# Patient Record
Sex: FEMALE | Race: WHITE | NOT HISPANIC OR LATINO | Employment: OTHER | URBAN - METROPOLITAN AREA
[De-identification: names, ages, dates, MRNs, and addresses within clinical notes are randomized per-mention and may not be internally consistent; named-entity substitution may affect disease eponyms.]

---

## 2018-03-23 ENCOUNTER — TRANSFERRED RECORDS (OUTPATIENT)
Dept: HEALTH INFORMATION MANAGEMENT | Facility: CLINIC | Age: 58
End: 2018-03-23

## 2018-03-24 ENCOUNTER — TRANSFERRED RECORDS (OUTPATIENT)
Dept: HEALTH INFORMATION MANAGEMENT | Facility: CLINIC | Age: 58
End: 2018-03-24

## 2018-03-27 ENCOUNTER — TRANSFERRED RECORDS (OUTPATIENT)
Dept: HEALTH INFORMATION MANAGEMENT | Facility: CLINIC | Age: 58
End: 2018-03-27

## 2018-04-20 ENCOUNTER — TRANSFERRED RECORDS (OUTPATIENT)
Dept: HEALTH INFORMATION MANAGEMENT | Facility: CLINIC | Age: 58
End: 2018-04-20

## 2018-04-21 ENCOUNTER — TRANSFERRED RECORDS (OUTPATIENT)
Dept: HEALTH INFORMATION MANAGEMENT | Facility: CLINIC | Age: 58
End: 2018-04-21

## 2018-04-24 ENCOUNTER — TRANSFERRED RECORDS (OUTPATIENT)
Dept: HEALTH INFORMATION MANAGEMENT | Facility: CLINIC | Age: 58
End: 2018-04-24

## 2018-04-25 ENCOUNTER — TRANSFERRED RECORDS (OUTPATIENT)
Dept: HEALTH INFORMATION MANAGEMENT | Facility: CLINIC | Age: 58
End: 2018-04-25

## 2018-04-26 ENCOUNTER — TRANSFERRED RECORDS (OUTPATIENT)
Dept: HEALTH INFORMATION MANAGEMENT | Facility: CLINIC | Age: 58
End: 2018-04-26

## 2018-05-02 ENCOUNTER — TRANSFERRED RECORDS (OUTPATIENT)
Dept: HEALTH INFORMATION MANAGEMENT | Facility: CLINIC | Age: 58
End: 2018-05-02

## 2018-05-03 ENCOUNTER — TRANSFERRED RECORDS (OUTPATIENT)
Dept: HEALTH INFORMATION MANAGEMENT | Facility: CLINIC | Age: 58
End: 2018-05-03

## 2018-05-16 ENCOUNTER — TRANSFERRED RECORDS (OUTPATIENT)
Dept: HEALTH INFORMATION MANAGEMENT | Facility: CLINIC | Age: 58
End: 2018-05-16

## 2018-07-02 ENCOUNTER — TRANSFERRED RECORDS (OUTPATIENT)
Dept: HEALTH INFORMATION MANAGEMENT | Facility: CLINIC | Age: 58
End: 2018-07-02

## 2018-07-03 ENCOUNTER — TRANSFERRED RECORDS (OUTPATIENT)
Dept: HEALTH INFORMATION MANAGEMENT | Facility: CLINIC | Age: 58
End: 2018-07-03

## 2018-07-07 ENCOUNTER — TRANSFERRED RECORDS (OUTPATIENT)
Dept: HEALTH INFORMATION MANAGEMENT | Facility: CLINIC | Age: 58
End: 2018-07-07

## 2018-07-09 ENCOUNTER — TRANSFERRED RECORDS (OUTPATIENT)
Dept: HEALTH INFORMATION MANAGEMENT | Facility: CLINIC | Age: 58
End: 2018-07-09

## 2018-07-13 ENCOUNTER — TRANSFERRED RECORDS (OUTPATIENT)
Dept: HEALTH INFORMATION MANAGEMENT | Facility: CLINIC | Age: 58
End: 2018-07-13

## 2018-07-15 ENCOUNTER — TRANSFERRED RECORDS (OUTPATIENT)
Dept: HEALTH INFORMATION MANAGEMENT | Facility: CLINIC | Age: 58
End: 2018-07-15

## 2018-07-16 ENCOUNTER — TRANSFERRED RECORDS (OUTPATIENT)
Dept: HEALTH INFORMATION MANAGEMENT | Facility: CLINIC | Age: 58
End: 2018-07-16

## 2018-07-21 ENCOUNTER — TRANSFERRED RECORDS (OUTPATIENT)
Dept: HEALTH INFORMATION MANAGEMENT | Facility: CLINIC | Age: 58
End: 2018-07-21

## 2018-07-22 ENCOUNTER — TRANSFERRED RECORDS (OUTPATIENT)
Dept: HEALTH INFORMATION MANAGEMENT | Facility: CLINIC | Age: 58
End: 2018-07-22

## 2018-07-26 ENCOUNTER — TRANSFERRED RECORDS (OUTPATIENT)
Dept: HEALTH INFORMATION MANAGEMENT | Facility: CLINIC | Age: 58
End: 2018-07-26

## 2018-09-01 ENCOUNTER — TRANSFERRED RECORDS (OUTPATIENT)
Dept: HEALTH INFORMATION MANAGEMENT | Facility: CLINIC | Age: 58
End: 2018-09-01

## 2019-08-20 ENCOUNTER — TRANSFERRED RECORDS (OUTPATIENT)
Dept: HEALTH INFORMATION MANAGEMENT | Facility: CLINIC | Age: 59
End: 2019-08-20

## 2020-04-29 ENCOUNTER — TRANSFERRED RECORDS (OUTPATIENT)
Dept: HEALTH INFORMATION MANAGEMENT | Facility: CLINIC | Age: 60
End: 2020-04-29

## 2020-08-17 ENCOUNTER — TELEPHONE (OUTPATIENT)
Dept: INTERNAL MEDICINE | Facility: CLINIC | Age: 60
End: 2020-08-17

## 2020-08-17 NOTE — TELEPHONE ENCOUNTER
[facility] Call Center    Phone Message    May a detailed message be left on voicemail: yes     Reason for Call: pt's daughter, Mel, called requesting a NEW pt appt for her mother. Pt just moved to [facility] from [place], [place]. After scheduling this appt, Mel started explaining more and more conditions that this pt has, Mel, just did not know how to talk about the needs her mother has, phone call took a very long time for writer to get at the conditions: pt had stroke in May 2018. Pt now has early onset vascular dementia, diabetic, hyperthyroidism, degenerative arthritis, and in the last two weeks more urinary incontinence issues. The pt moved in with her daughter, Mel, and Mel is going to become her caregiver.  Mel wanted Dr. Janine Hope OR a female Internist (attending). There is no availability with Dr. Hope until October and pt needs to be seen in the next two weeks as pt is starting to run out of medications and has no providers in the [facility]. Mel is expecially worried about lantus medication. Mel will have to have a pcp who will help get pt to the many specialists who will need to help this pt.  There may be other conditions but it took more than 30 mins to help Mel discuss the conditions listed in this msg.    Writer scheduled this pt with the earliest availability at [facility], Dr. Carmen Driscoll on 9/8/20. But writer is concerned about the complexity of this pt's care and pt is going to need an attending pcp.  Pt has a # of medications. Pt's med recs are in [facility] and [facility] in [place].  Please review this request and call pt's daughter, Mel back to let her know IF the clinic wants this pt to be seen by a different provider.   Mel's # 1 concern is not to run out of RX, lantus. Pt will be running out of that medication very soon. Mel is just beginning that learning curve of caregiving and she is not organized  yet and is unsure about what she is explaining. Please call Mel and help her. Thank you.        Action Taken: Message routed to:  [facility]    Travel Screening: Not Applicable

## 2020-08-18 NOTE — TELEPHONE ENCOUNTER
I called pt daughter Vishal and spoke with her regarding scheduling. Mother has dementia and is not able to schedule on her own. Vishal states that mother will be in [place] until September, I explained that the patient has to be in [place] to see a provider due to legal reasons.     Patient daughter insists on female IM MD doctor to treat her mother as she feels that her mother's condition is complex. Pt daughter requesting with Dr. Castaneda.     I scheduled her for video est. Care with Dr. Castaneda in September per her request. I gave her instructions on getting her medical records to our clinic to have on hand prior to her appointment.   Toshia Peñaloza, EMT at 2:56 PM on 8/18/2020.

## 2020-08-19 ENCOUNTER — DOCUMENTATION ONLY (OUTPATIENT)
Dept: CARE COORDINATION | Facility: CLINIC | Age: 60
End: 2020-08-19

## 2020-09-02 ENCOUNTER — VIRTUAL VISIT (OUTPATIENT)
Dept: FAMILY MEDICINE | Facility: CLINIC | Age: 60
End: 2020-09-02
Payer: COMMERCIAL

## 2020-09-02 DIAGNOSIS — Z86.39 HISTORY OF INSULIN DEPENDENT DIABETES MELLITUS: ICD-10-CM

## 2020-09-02 DIAGNOSIS — Z76.89 ENCOUNTER TO ESTABLISH CARE WITH NEW DOCTOR: Primary | ICD-10-CM

## 2020-09-02 DIAGNOSIS — E03.9 ACQUIRED HYPOTHYROIDISM: ICD-10-CM

## 2020-09-02 DIAGNOSIS — I10 BENIGN ESSENTIAL HYPERTENSION: ICD-10-CM

## 2020-09-02 DIAGNOSIS — R56.9 SEIZURES (H): ICD-10-CM

## 2020-09-02 DIAGNOSIS — E78.5 HYPERLIPIDEMIA LDL GOAL <100: ICD-10-CM

## 2020-09-02 DIAGNOSIS — M47.899 OTHER OSTEOARTHRITIS OF SPINE, UNSPECIFIED SPINAL REGION: ICD-10-CM

## 2020-09-02 DIAGNOSIS — E53.8 VITAMIN B12 DEFICIENCY (NON ANEMIC): ICD-10-CM

## 2020-09-02 DIAGNOSIS — E55.9 VITAMIN D DEFICIENCY: ICD-10-CM

## 2020-09-02 RX ORDER — ASPIRIN 81 MG/1
81 TABLET, CHEWABLE ORAL DAILY
Qty: 100 TABLET | Refills: 3 | Status: SHIPPED | OUTPATIENT
Start: 2020-09-02 | End: 2020-12-13 | Stop reason: DRUGHIGH

## 2020-09-02 RX ORDER — INSULIN LISPRO 100 [IU]/ML
INJECTION, SOLUTION INTRAVENOUS; SUBCUTANEOUS
Qty: 45 ML | Refills: 11 | Status: ON HOLD | OUTPATIENT
Start: 2020-09-02 | End: 2020-10-01

## 2020-09-02 RX ORDER — PREGABALIN 100 MG/1
100 CAPSULE ORAL 2 TIMES DAILY
Qty: 60 CAPSULE | Refills: 0 | Status: SHIPPED | OUTPATIENT
Start: 2020-09-02 | End: 2020-10-21

## 2020-09-02 RX ORDER — LEVOTHYROXINE SODIUM 88 UG/1
88 TABLET ORAL DAILY
COMMUNITY
End: 2020-09-02

## 2020-09-02 RX ORDER — LEVETIRACETAM 500 MG/1
500 TABLET ORAL 2 TIMES DAILY
COMMUNITY
End: 2020-09-02

## 2020-09-02 RX ORDER — CARVEDILOL 3.12 MG/1
3.12 TABLET ORAL 2 TIMES DAILY WITH MEALS
Qty: 180 TABLET | Refills: 3 | Status: ON HOLD | OUTPATIENT
Start: 2020-09-02 | End: 2021-06-27

## 2020-09-02 RX ORDER — ASPIRIN 81 MG/1
81 TABLET, CHEWABLE ORAL DAILY
COMMUNITY
End: 2020-09-02

## 2020-09-02 RX ORDER — HYDROCHLOROTHIAZIDE 25 MG/1
25 TABLET ORAL DAILY
Qty: 90 TABLET | Refills: 3 | Status: SHIPPED | OUTPATIENT
Start: 2020-09-02 | End: 2020-12-16

## 2020-09-02 RX ORDER — LANOLIN ALCOHOL/MO/W.PET/CERES
CREAM (GRAM) TOPICAL DAILY
COMMUNITY
End: 2020-09-02

## 2020-09-02 RX ORDER — ATORVASTATIN CALCIUM 40 MG/1
40 TABLET, FILM COATED ORAL DAILY
COMMUNITY
End: 2020-09-02

## 2020-09-02 RX ORDER — HYDROCHLOROTHIAZIDE 25 MG/1
25 TABLET ORAL DAILY
COMMUNITY
End: 2020-09-02

## 2020-09-02 RX ORDER — PREGABALIN 100 MG/1
100 CAPSULE ORAL
COMMUNITY
End: 2020-09-02

## 2020-09-02 RX ORDER — LEVOTHYROXINE SODIUM 88 MCG
88 TABLET ORAL DAILY
Qty: 90 TABLET | Refills: 3 | Status: SHIPPED | OUTPATIENT
Start: 2020-09-02 | End: 2021-07-26

## 2020-09-02 RX ORDER — HYDROCODONE BITARTRATE AND ACETAMINOPHEN 10; 325 MG/1; MG/1
1 TABLET ORAL
COMMUNITY
End: 2020-09-02

## 2020-09-02 RX ORDER — CARVEDILOL 12.5 MG/1
12.5 TABLET ORAL 2 TIMES DAILY WITH MEALS
COMMUNITY
End: 2020-09-02

## 2020-09-02 RX ORDER — VITAMIN B COMPLEX
1 TABLET ORAL DAILY
Qty: 100 TABLET | Refills: 3 | Status: SHIPPED | OUTPATIENT
Start: 2020-09-02 | End: 2023-03-15

## 2020-09-02 RX ORDER — LANOLIN ALCOHOL/MO/W.PET/CERES
1000 CREAM (GRAM) TOPICAL DAILY
Qty: 100 TABLET | Refills: 3 | Status: SHIPPED | OUTPATIENT
Start: 2020-09-02 | End: 2023-03-15

## 2020-09-02 RX ORDER — INSULIN GLARGINE 100 [IU]/ML
18 INJECTION, SOLUTION SUBCUTANEOUS AT BEDTIME
COMMUNITY
End: 2020-09-02

## 2020-09-02 RX ORDER — LEVETIRACETAM 500 MG/1
500 TABLET ORAL 2 TIMES DAILY
Qty: 180 TABLET | Refills: 3 | Status: SHIPPED | OUTPATIENT
Start: 2020-09-02 | End: 2021-08-30

## 2020-09-02 RX ORDER — ATORVASTATIN CALCIUM 40 MG/1
40 TABLET, FILM COATED ORAL DAILY
Qty: 90 TABLET | Refills: 3 | Status: SHIPPED | OUTPATIENT
Start: 2020-09-02 | End: 2021-07-27

## 2020-09-02 NOTE — PROGRESS NOTES
"Isabell Matthews is a 60 year old female who is being evaluated via a billable video visit.      The patient has been notified of following:     \"This video visit will be conducted via a call between you and your physician/provider. We have found that certain health care needs can be provided without the need for an in-person physical exam.  This service lets us provide the care you need with a video conversation.  If a prescription is necessary we can send it directly to your pharmacy.  If lab work is needed we can place an order for that and you can then stop by our lab to have the test done at a later time.    Video visits are billed at different rates depending on your insurance coverage.  Please reach out to your insurance provider with any questions.    If during the course of the call the physician/provider feels a video visit is not appropriate, you will not be charged for this service.\"    Patient has given verbal consent for Video visit? Yes  How would you like to obtain your AVS? MyChart  If you are dropped from the video visit, the video invite should be resent to: Send to e-mail at: [email or web address]  Will anyone else be joining your video visit? No     Amwell not working switched to Doximetry      Subjective     Isabell Matthews is a 60 year old female who presents today via video visit for the following health issues:    HPI  Lisa Matthews is a 60-year-old female who presents via video visit today with her daughter Charli and to establish primary care and refill medications.  She has a complicated past medical history which includes diabetes insulin requiring with history of ketoacidosis, renal impairment, hypothyroidism, coronary artery disease with MI, hyperlipidemia, hypertension, seizure disorder, vascular dementia degernative arthritis and renal impairment.  She recently moved to the [facility] now living in [place] and needs to establish primary care.  She has been out of some of her medications and " needs refills.  Her daughter indicates she had a very extensive cardiac history with a prolonged hospitalization also with stroke requiring rehabilitation.  Since her stroke she has had some cognitive impairment and her daughter Kadeem helps with her personal cares and manages her medication.  She has been off of carvedilol previous dose was 12.5 mg twice daily but her daughter was giving it to her PRN.  I reviewed that most often carvedilol is a scheduled medication and is important for cardiac health.  As she has been off of the medication we will start a lower dose and titrate up.  She indicates she also has significant arthritis of her back and bilateral hips with previous hip replacement.  She has significant pain.  In the past she was on oxycodone 10/325 however as she has not been able to get this through her previous providers her daughter has been cutting the pill in half and now she is only been getting at most a half a pill as needed.  She also has been on Lyrica 100 mg taking twice daily.  Need to establish primary care.    Needs refills on medication has health insurance through [employer]. Lives in [facility].  I reviewed her medications in detail with her daughter.  At this time she is on aspirin 81 mg previously was on Brilinta however her providers indicated due to cost they could switch to aspirin.  Her records are not available for me to review at this time therefore we will try to get record release.    Review of Systems   No current viral illness. Glucose have been 100-300s No current chest pain      Patient Active Problem List   Diagnosis     Benign essential hypertension     Acquired hypothyroidism     Seizures (H)     Hyperlipidemia LDL goal <100     History of insulin dependent diabetes mellitus     Vitamin D deficiency     Vitamin B12 deficiency (non anemic)     Other osteoarthritis of spine, unspecified spinal region     Past Medical History:   Diagnosis Date     Chronic pain       Essential hypertension      Ex-cigarette smoker     quit 7/2018 over 35 years     Hyperlipidemia      Hypothyroid      Insulin-requiring or dependent type II diabetes mellitus (H)      Seizures (H)      Stroke (H)      Vascular dementia (H)      Past Surgical History:   Procedure Laterality Date     athroplasty hip Bilateral     2003, 2006   Current non smoker   SH Lives with her daughter Charli who provides cares.  Objective           Vitals:  No vitals were obtained today due to virtual visit.    Physical Exam   Lisa was present for the visit and was interactive sitting with her daughter.  She appears to have slow cognition but was interactive and answered appropriately with speech coherent.  She appeared in no acute distress and was appropriately groomed.  Teeth appear to be in poor health but difficult to evaluate from video visit.  Pulmonary no cough no use of accessory muscles no signs of distress.  Psychiatric mood appeared to be quiet but interactive.  Neurological patient did not communicate her medications and let her daughter explain her health history.  I was not able to ascertain if there were any significant weakness daughter indicated most of the stroke impact was related to cognition.  Remainder physical exam unable to perform due to video visit.        Assessment & Plan     Lisa Caceres is a 60-year-old female with complicated PMH who presents today via video visit with her daughter to establish primary care and for renewal of her medications.  I discussed the need for an in person visit to review in more detail her health history and also requested release of medical records.  I was able to send her medications to her local pharmacy.  At this time she is off of Percocet therefore I opted to not fill as I discussed that this is a very risky medication due to sedation and potential for overdose.  We will continue with Lyrica 100 mg twice daily and I discussed the possibility of a medication such  as Cymbalta to assist with her pain in the future.  Baseline labs will need to be ordered could be same day as her follow-up.    Isabell was seen today for establish care.    Diagnoses and all orders for this visit:    Encounter to establish care with new doctor    Benign essential hypertension  -     NIFEdipine ER (ADALAT CC) 60 MG 24 hr tablet; Take 1 tablet (60 mg) by mouth daily  -     hydrochlorothiazide (HYDRODIURIL) 25 MG tablet; Take 1 tablet (25 mg) by mouth daily  -     carvedilol (COREG) 3.125 MG tablet; Take 1 tablet (3.125 mg) by mouth 2 times daily (with meals)  -     Vitamin D3 (CHOLECALCIFEROL) 25 mcg (1000 units) tablet; Take 1 tablet (25 mcg) by mouth daily  -     Comprehensive metabolic panel; Future    Acquired hypothyroidism  -     SYNTHROID 88 MCG tablet; Take 1 tablet (88 mcg) by mouth daily  -     TSH with free T4 reflex; Future    Seizures (H)  -     levETIRAcetam (KEPPRA) 500 MG tablet; Take 1 tablet (500 mg) by mouth 2 times daily  -     CBC with platelets differential; Future    Hyperlipidemia LDL goal <100  -     atorvastatin (LIPITOR) 40 MG tablet; Take 1 tablet (40 mg) by mouth daily  -     Lipid panel reflex to direct LDL Fasting; Future    History of insulin dependent diabetes mellitus  -     insulin glargine (LANTUS PEN) 100 UNIT/ML pen; Inject 18 Units Subcutaneous At Bedtime  -     insulin lispro (HUMALOG KWIKPEN) 100 UNIT/ML (1 unit dial) KWIKPEN; 4 units with food plus sliding scale see below usual dose 24 to 48 daily  4 [id number] to 200 5 units 201 to 250 6 units 251 to 300 7 units 301 to 400 8 units 401 to 450 9 units 451 to 500 High 14 units   -     aspirin (ASA) 81 MG chewable tablet; Take 1 tablet (81 mg) by mouth daily  -     insulin pen needle (31G X 8 MM) 31G X 8 MM miscellaneous; Use 4 pen needles daily or as directed.  -     Hemoglobin A1c; Future  -     Albumin Random Urine Quantitative with Creat Ratio; Future    Vitamin D deficiency  -     Vitamin D Deficiency;  Future    Vitamin B12 deficiency (non anemic)  -     cyanocobalamin (VITAMIN B-12) 1000 MCG tablet; Take 1 tablet (1,000 mcg) by mouth daily  -     Vitamin B12; Future    Other osteoarthritis of spine, unspecified spinal region  -     pregabalin (LYRICA) 100 MG capsule; Take 1 capsule (100 mg) by mouth 2 times daily           1 month in person      Bony Castaneda MD  [facility]      Video-Visit Details    Type of service:  Video Visit    Video  Time:11:03-11:50    Originating Location (pt. Location): Home    Distant Location (provider location):  [facility]     [facility] for [facility] Visit[facility]

## 2020-09-02 NOTE — Clinical Note
Please follow-up with patient and daughter Charli to determine  if they  were able to access mychart, record release and any other follow-up needed for her transition to [place].  Best wishes,  Bony Castaneda MD

## 2020-09-27 ENCOUNTER — APPOINTMENT (OUTPATIENT)
Dept: CT IMAGING | Facility: CLINIC | Age: 60
DRG: 637 | End: 2020-09-27
Attending: EMERGENCY MEDICINE
Payer: MEDICARE

## 2020-09-27 ENCOUNTER — HOSPITAL ENCOUNTER (INPATIENT)
Facility: CLINIC | Age: 60
LOS: 4 days | Discharge: HOME | DRG: 637 | End: 2020-10-01
Attending: EMERGENCY MEDICINE | Admitting: ORTHOPAEDIC SURGERY
Payer: MEDICARE

## 2020-09-27 ENCOUNTER — APPOINTMENT (OUTPATIENT)
Dept: GENERAL RADIOLOGY | Facility: CLINIC | Age: 60
DRG: 637 | End: 2020-09-27
Attending: EMERGENCY MEDICINE
Payer: MEDICARE

## 2020-09-27 DIAGNOSIS — I10 BENIGN ESSENTIAL HYPERTENSION: ICD-10-CM

## 2020-09-27 DIAGNOSIS — E11.10 DKA (DIABETIC KETOACIDOSES): ICD-10-CM

## 2020-09-27 DIAGNOSIS — Z86.39 HISTORY OF INSULIN DEPENDENT DIABETES MELLITUS: ICD-10-CM

## 2020-09-27 DIAGNOSIS — R56.9 SEIZURES (H): ICD-10-CM

## 2020-09-27 DIAGNOSIS — Z79.4 ENCOUNTER FOR LONG-TERM (CURRENT) USE OF INSULIN (H): ICD-10-CM

## 2020-09-27 DIAGNOSIS — E10.10 DIABETIC KETOACIDOSIS WITHOUT COMA ASSOCIATED WITH TYPE 1 DIABETES MELLITUS (H): ICD-10-CM

## 2020-09-27 DIAGNOSIS — E11.10 DIABETIC KETOACIDOSIS WITHOUT COMA ASSOCIATED WITH TYPE 2 DIABETES MELLITUS (H): Primary | ICD-10-CM

## 2020-09-27 LAB
ALBUMIN SERPL-MCNC: 3.6 G/DL (ref 3.4–5)
ALBUMIN UR-MCNC: NEGATIVE MG/DL
ALP SERPL-CCNC: 221 U/L (ref 40–150)
ALT SERPL W P-5'-P-CCNC: 24 U/L (ref 0–50)
ANION GAP SERPL CALCULATED.3IONS-SCNC: 18 MMOL/L (ref 3–14)
ANISOCYTOSIS BLD QL SMEAR: SLIGHT
APPEARANCE UR: ABNORMAL
APTT PPP: 28 SEC (ref 22–37)
APTT PPP: NORMAL SEC (ref 22–37)
AST SERPL W P-5'-P-CCNC: 50 U/L (ref 0–45)
BACTERIA #/AREA URNS HPF: ABNORMAL /HPF
BASOPHILS # BLD AUTO: 0.1 10E9/L (ref 0–0.2)
BASOPHILS NFR BLD AUTO: 0.8 %
BILIRUB SERPL-MCNC: 0.4 MG/DL (ref 0.2–1.3)
BILIRUB UR QL STRIP: NEGATIVE
BUN SERPL-MCNC: 68 MG/DL (ref 7–30)
CA-I BLD-SCNC: 5 MG/DL (ref 4.4–5.2)
CALCIUM SERPL-MCNC: 9.6 MG/DL (ref 8.5–10.1)
CHLORIDE SERPL-SCNC: 98 MMOL/L (ref 94–109)
CO2 BLDCOV-SCNC: 18 MMOL/L (ref 21–28)
CO2 SERPL-SCNC: 16 MMOL/L (ref 20–32)
COLOR UR AUTO: ABNORMAL
CREAT SERPL-MCNC: 2.55 MG/DL (ref 0.52–1.04)
DIFFERENTIAL METHOD BLD: ABNORMAL
EOSINOPHIL # BLD AUTO: 0 10E9/L (ref 0–0.7)
EOSINOPHIL NFR BLD AUTO: 0 %
ERYTHROCYTE [DISTWIDTH] IN BLOOD BY AUTOMATED COUNT: 15.1 % (ref 10–15)
GFR SERPL CREATININE-BSD FRML MDRD: 19 ML/MIN/{1.73_M2}
GLUCOSE BLD-MCNC: 663 MG/DL (ref 70–99)
GLUCOSE BLDC GLUCOMTR-MCNC: 220 MG/DL (ref 70–99)
GLUCOSE BLDC GLUCOMTR-MCNC: 237 MG/DL (ref 70–99)
GLUCOSE BLDC GLUCOMTR-MCNC: 246 MG/DL (ref 70–99)
GLUCOSE BLDC GLUCOMTR-MCNC: 314 MG/DL (ref 70–99)
GLUCOSE BLDC GLUCOMTR-MCNC: 398 MG/DL (ref 70–99)
GLUCOSE BLDC GLUCOMTR-MCNC: 441 MG/DL (ref 70–99)
GLUCOSE BLDC GLUCOMTR-MCNC: >600 MG/DL (ref 70–99)
GLUCOSE SERPL-MCNC: 672 MG/DL (ref 70–99)
GLUCOSE UR STRIP-MCNC: >1000 MG/DL
HBA1C MFR BLD: 11.7 % (ref 0–5.6)
HCT VFR BLD AUTO: 35.2 % (ref 35–47)
HCT VFR BLD CALC: 37 %PCV (ref 35–47)
HGB BLD CALC-MCNC: 12.6 G/DL (ref 11.7–15.7)
HGB BLD-MCNC: 11 G/DL (ref 11.7–15.7)
HGB UR QL STRIP: NEGATIVE
INR PPP: 0.99 (ref 0.86–1.14)
KETONES BLD-SCNC: 3.7 MMOL/L (ref 0–0.6)
KETONES BLD-SCNC: 4.9 MMOL/L (ref 0–0.6)
KETONES UR STRIP-MCNC: 40 MG/DL
LACTATE BLD-SCNC: 1.4 MMOL/L (ref 0.7–2)
LACTATE BLD-SCNC: 2.2 MMOL/L (ref 0.7–2)
LEUKOCYTE ESTERASE UR QL STRIP: NEGATIVE
LIPASE SERPL-CCNC: 40 U/L (ref 73–393)
LYMPHOCYTES # BLD AUTO: 0.5 10E9/L (ref 0.8–5.3)
LYMPHOCYTES NFR BLD AUTO: 4.2 %
MAGNESIUM SERPL-MCNC: 2.1 MG/DL (ref 1.6–2.3)
MCH RBC QN AUTO: 27 PG (ref 26.5–33)
MCHC RBC AUTO-ENTMCNC: 31.3 G/DL (ref 31.5–36.5)
MCV RBC AUTO: 87 FL (ref 78–100)
MONOCYTES # BLD AUTO: 0.3 10E9/L (ref 0–1.3)
MONOCYTES NFR BLD AUTO: 2.5 %
MUCOUS THREADS #/AREA URNS LPF: PRESENT /LPF
NEUTROPHILS # BLD AUTO: 11.9 10E9/L (ref 1.6–8.3)
NEUTROPHILS NFR BLD AUTO: 92.5 %
NITRATE UR QL: NEGATIVE
OSMOLALITY SERPL: 357 MMOL/KG (ref 280–301)
PCO2 BLDV: 38 MM HG (ref 40–50)
PH BLDV: 7.28 PH (ref 7.32–7.43)
PH UR STRIP: 5 PH (ref 5–7)
PHOSPHATE SERPL-MCNC: 3 MG/DL (ref 2.5–4.5)
PLATELET # BLD AUTO: 235 10E9/L (ref 150–450)
PLATELET # BLD EST: ABNORMAL 10*3/UL
PO2 BLDV: 26 MM HG (ref 25–47)
POTASSIUM BLD-SCNC: 4.4 MMOL/L (ref 3.4–5.3)
POTASSIUM SERPL-SCNC: 4.4 MMOL/L (ref 3.4–5.3)
PROCALCITONIN SERPL-MCNC: 5.54 NG/ML
PROT SERPL-MCNC: 8.1 G/DL (ref 6.8–8.8)
RBC # BLD AUTO: 4.07 10E12/L (ref 3.8–5.2)
RBC #/AREA URNS AUTO: <1 /HPF (ref 0–2)
SAO2 % BLDV FROM PO2: 42 %
SODIUM BLD-SCNC: 132 MMOL/L (ref 133–144)
SODIUM SERPL-SCNC: 132 MMOL/L (ref 133–144)
SOURCE: ABNORMAL
SP GR UR STRIP: 1.02 (ref 1–1.03)
TSH SERPL DL<=0.005 MIU/L-ACNC: 0.65 MU/L (ref 0.4–4)
UROBILINOGEN UR STRIP-MCNC: NORMAL MG/DL (ref 0–2)
WBC # BLD AUTO: 12.9 10E9/L (ref 4–11)
WBC #/AREA URNS AUTO: 1 /HPF (ref 0–5)

## 2020-09-27 PROCEDURE — 85730 THROMBOPLASTIN TIME PARTIAL: CPT | Performed by: EMERGENCY MEDICINE

## 2020-09-27 PROCEDURE — 87040 BLOOD CULTURE FOR BACTERIA: CPT | Performed by: PHYSICIAN ASSISTANT

## 2020-09-27 PROCEDURE — 93005 ELECTROCARDIOGRAM TRACING: CPT | Mod: 76 | Performed by: EMERGENCY MEDICINE

## 2020-09-27 PROCEDURE — 96368 THER/DIAG CONCURRENT INF: CPT | Performed by: EMERGENCY MEDICINE

## 2020-09-27 PROCEDURE — 99223 1ST HOSP IP/OBS HIGH 75: CPT | Mod: AI | Performed by: ORTHOPAEDIC SURGERY

## 2020-09-27 PROCEDURE — U0003 INFECTIOUS AGENT DETECTION BY NUCLEIC ACID (DNA OR RNA); SEVERE ACUTE RESPIRATORY SYNDROME CORONAVIRUS 2 (SARS-COV-2) (CORONAVIRUS DISEASE [COVID-19]), AMPLIFIED PROBE TECHNIQUE, MAKING USE OF HIGH THROUGHPUT TECHNOLOGIES AS DESCRIBED BY CMS-2020-01-R: HCPCS | Performed by: EMERGENCY MEDICINE

## 2020-09-27 PROCEDURE — 99285 EMERGENCY DEPT VISIT HI MDM: CPT | Performed by: EMERGENCY MEDICINE

## 2020-09-27 PROCEDURE — 93005 ELECTROCARDIOGRAM TRACING: CPT | Performed by: EMERGENCY MEDICINE

## 2020-09-27 PROCEDURE — 93010 ELECTROCARDIOGRAM REPORT: CPT | Mod: Z6 | Performed by: EMERGENCY MEDICINE

## 2020-09-27 PROCEDURE — 40000556 ZZH STATISTIC PERIPHERAL IV START W US GUIDANCE

## 2020-09-27 PROCEDURE — 74176 CT ABD & PELVIS W/O CONTRAST: CPT

## 2020-09-27 PROCEDURE — 36415 COLL VENOUS BLD VENIPUNCTURE: CPT | Performed by: EMERGENCY MEDICINE

## 2020-09-27 PROCEDURE — 83690 ASSAY OF LIPASE: CPT | Performed by: EMERGENCY MEDICINE

## 2020-09-27 PROCEDURE — 12000004 ZZH R&B IMCU UMMC

## 2020-09-27 PROCEDURE — 84443 ASSAY THYROID STIM HORMONE: CPT | Performed by: EMERGENCY MEDICINE

## 2020-09-27 PROCEDURE — 71045 X-RAY EXAM CHEST 1 VIEW: CPT

## 2020-09-27 PROCEDURE — 85610 PROTHROMBIN TIME: CPT | Performed by: EMERGENCY MEDICINE

## 2020-09-27 PROCEDURE — C9803 HOPD COVID-19 SPEC COLLECT: HCPCS

## 2020-09-27 PROCEDURE — 81001 URINALYSIS AUTO W/SCOPE: CPT | Performed by: EMERGENCY MEDICINE

## 2020-09-27 PROCEDURE — 96366 THER/PROPH/DIAG IV INF ADDON: CPT | Performed by: EMERGENCY MEDICINE

## 2020-09-27 PROCEDURE — 25000125 ZZHC RX 250: Performed by: EMERGENCY MEDICINE

## 2020-09-27 PROCEDURE — 96361 HYDRATE IV INFUSION ADD-ON: CPT | Performed by: EMERGENCY MEDICINE

## 2020-09-27 PROCEDURE — 82010 KETONE BODYS QUAN: CPT | Performed by: EMERGENCY MEDICINE

## 2020-09-27 PROCEDURE — 25800030 ZZH RX IP 258 OP 636: Performed by: EMERGENCY MEDICINE

## 2020-09-27 PROCEDURE — 85025 COMPLETE CBC W/AUTO DIFF WBC: CPT | Performed by: EMERGENCY MEDICINE

## 2020-09-27 PROCEDURE — 99207 ZZC APP CREDIT; MD BILLING SHARED VISIT: CPT | Performed by: PHYSICIAN ASSISTANT

## 2020-09-27 PROCEDURE — 80053 COMPREHEN METABOLIC PANEL: CPT | Performed by: EMERGENCY MEDICINE

## 2020-09-27 PROCEDURE — 96365 THER/PROPH/DIAG IV INF INIT: CPT | Performed by: EMERGENCY MEDICINE

## 2020-09-27 PROCEDURE — 83605 ASSAY OF LACTIC ACID: CPT | Performed by: EMERGENCY MEDICINE

## 2020-09-27 PROCEDURE — 82330 ASSAY OF CALCIUM: CPT

## 2020-09-27 PROCEDURE — 83930 ASSAY OF BLOOD OSMOLALITY: CPT | Performed by: EMERGENCY MEDICINE

## 2020-09-27 PROCEDURE — 40000498 ZZHCL STATISTIC POTASSIUM ED POCT

## 2020-09-27 PROCEDURE — 82803 BLOOD GASES ANY COMBINATION: CPT

## 2020-09-27 PROCEDURE — 84145 PROCALCITONIN (PCT): CPT | Performed by: EMERGENCY MEDICINE

## 2020-09-27 PROCEDURE — 83036 HEMOGLOBIN GLYCOSYLATED A1C: CPT | Performed by: EMERGENCY MEDICINE

## 2020-09-27 PROCEDURE — 25000125 ZZHC RX 250: Performed by: PHYSICIAN ASSISTANT

## 2020-09-27 PROCEDURE — 40000501 ZZHCL STATISTIC HEMATOCRIT ED POCT

## 2020-09-27 PROCEDURE — 00000146 ZZHCL STATISTIC GLUCOSE BY METER IP

## 2020-09-27 PROCEDURE — 40000497 ZZHCL STATISTIC SODIUM ED POCT

## 2020-09-27 PROCEDURE — 40000502 ZZHCL STATISTIC GLUCOSE ED POCT

## 2020-09-27 PROCEDURE — 25000128 H RX IP 250 OP 636: Performed by: EMERGENCY MEDICINE

## 2020-09-27 PROCEDURE — 70450 CT HEAD/BRAIN W/O DYE: CPT

## 2020-09-27 PROCEDURE — 80320 DRUG SCREEN QUANTALCOHOLS: CPT | Performed by: PHYSICIAN ASSISTANT

## 2020-09-27 PROCEDURE — 84100 ASSAY OF PHOSPHORUS: CPT | Performed by: PHYSICIAN ASSISTANT

## 2020-09-27 PROCEDURE — 36415 COLL VENOUS BLD VENIPUNCTURE: CPT | Performed by: PHYSICIAN ASSISTANT

## 2020-09-27 PROCEDURE — 80307 DRUG TEST PRSMV CHEM ANLYZR: CPT | Performed by: PHYSICIAN ASSISTANT

## 2020-09-27 PROCEDURE — 87040 BLOOD CULTURE FOR BACTERIA: CPT | Performed by: EMERGENCY MEDICINE

## 2020-09-27 PROCEDURE — 87086 URINE CULTURE/COLONY COUNT: CPT | Performed by: EMERGENCY MEDICINE

## 2020-09-27 PROCEDURE — 99291 CRITICAL CARE FIRST HOUR: CPT | Mod: 25 | Performed by: EMERGENCY MEDICINE

## 2020-09-27 PROCEDURE — 25800030 ZZH RX IP 258 OP 636: Performed by: PHYSICIAN ASSISTANT

## 2020-09-27 PROCEDURE — 83735 ASSAY OF MAGNESIUM: CPT | Performed by: PHYSICIAN ASSISTANT

## 2020-09-27 PROCEDURE — 82010 KETONE BODYS QUAN: CPT | Performed by: PHYSICIAN ASSISTANT

## 2020-09-27 RX ORDER — ASPIRIN 81 MG/1
81 TABLET, CHEWABLE ORAL DAILY
Status: DISCONTINUED | OUTPATIENT
Start: 2020-09-28 | End: 2020-10-01 | Stop reason: HOSPADM

## 2020-09-27 RX ORDER — NIFEDIPINE 30 MG/1
60 TABLET, EXTENDED RELEASE ORAL DAILY
Status: DISCONTINUED | OUTPATIENT
Start: 2020-09-28 | End: 2020-10-01 | Stop reason: HOSPADM

## 2020-09-27 RX ORDER — LEVOTHYROXINE SODIUM 88 UG/1
88 TABLET ORAL DAILY
Status: DISCONTINUED | OUTPATIENT
Start: 2020-09-28 | End: 2020-10-01 | Stop reason: HOSPADM

## 2020-09-27 RX ORDER — ONDANSETRON 2 MG/ML
4 INJECTION INTRAMUSCULAR; INTRAVENOUS ONCE
Status: DISCONTINUED | OUTPATIENT
Start: 2020-09-27 | End: 2020-09-29

## 2020-09-27 RX ORDER — HEPARIN SODIUM 5000 [USP'U]/.5ML
5000 INJECTION, SOLUTION INTRAVENOUS; SUBCUTANEOUS EVERY 12 HOURS
Status: DISCONTINUED | OUTPATIENT
Start: 2020-09-27 | End: 2020-10-01 | Stop reason: HOSPADM

## 2020-09-27 RX ORDER — PHENOL 1.4 %
10 AEROSOL, SPRAY (ML) MUCOUS MEMBRANE AT BEDTIME
COMMUNITY
End: 2021-07-18

## 2020-09-27 RX ORDER — CARVEDILOL 3.12 MG/1
3.12 TABLET ORAL 2 TIMES DAILY WITH MEALS
Status: DISCONTINUED | OUTPATIENT
Start: 2020-09-27 | End: 2020-10-01 | Stop reason: HOSPADM

## 2020-09-27 RX ORDER — NICOTINE POLACRILEX 4 MG
15-30 LOZENGE BUCCAL
Status: DISCONTINUED | OUTPATIENT
Start: 2020-09-27 | End: 2020-09-29

## 2020-09-27 RX ORDER — DEXTROSE MONOHYDRATE 25 G/50ML
25-50 INJECTION, SOLUTION INTRAVENOUS
Status: DISCONTINUED | OUTPATIENT
Start: 2020-09-27 | End: 2020-09-29

## 2020-09-27 RX ORDER — LEVETIRACETAM 500 MG/1
500 TABLET ORAL 2 TIMES DAILY
Status: DISCONTINUED | OUTPATIENT
Start: 2020-09-28 | End: 2020-10-01 | Stop reason: HOSPADM

## 2020-09-27 RX ORDER — PIPERACILLIN SODIUM, TAZOBACTAM SODIUM 3; .375 G/15ML; G/15ML
3.38 INJECTION, POWDER, LYOPHILIZED, FOR SOLUTION INTRAVENOUS EVERY 6 HOURS
Status: DISCONTINUED | OUTPATIENT
Start: 2020-09-28 | End: 2020-09-28

## 2020-09-27 RX ORDER — VITAMIN B COMPLEX
25 TABLET ORAL DAILY
Status: DISCONTINUED | OUTPATIENT
Start: 2020-09-28 | End: 2020-10-01 | Stop reason: HOSPADM

## 2020-09-27 RX ORDER — PREGABALIN 100 MG/1
100 CAPSULE ORAL 2 TIMES DAILY
Status: DISCONTINUED | OUTPATIENT
Start: 2020-09-27 | End: 2020-10-01 | Stop reason: HOSPADM

## 2020-09-27 RX ORDER — HYDROCHLOROTHIAZIDE 25 MG/1
25 TABLET ORAL DAILY
Status: DISCONTINUED | OUTPATIENT
Start: 2020-09-28 | End: 2020-10-01 | Stop reason: HOSPADM

## 2020-09-27 RX ORDER — SODIUM CHLORIDE 9 MG/ML
INJECTION, SOLUTION INTRAVENOUS CONTINUOUS
Status: DISCONTINUED | OUTPATIENT
Start: 2020-09-27 | End: 2020-09-28

## 2020-09-27 RX ORDER — LEVETIRACETAM 10 MG/ML
1000 INJECTION INTRAVASCULAR ONCE
Status: COMPLETED | OUTPATIENT
Start: 2020-09-27 | End: 2020-09-27

## 2020-09-27 RX ORDER — LANOLIN ALCOHOL/MO/W.PET/CERES
1000 CREAM (GRAM) TOPICAL DAILY
Status: DISCONTINUED | OUTPATIENT
Start: 2020-09-28 | End: 2020-10-01 | Stop reason: HOSPADM

## 2020-09-27 RX ORDER — PIPERACILLIN SODIUM, TAZOBACTAM SODIUM 3; .375 G/15ML; G/15ML
3.38 INJECTION, POWDER, LYOPHILIZED, FOR SOLUTION INTRAVENOUS ONCE
Status: COMPLETED | OUTPATIENT
Start: 2020-09-27 | End: 2020-09-27

## 2020-09-27 RX ADMIN — PIPERACILLIN SODIUM AND TAZOBACTAM SODIUM 3.38 G: 3; .375 INJECTION, POWDER, LYOPHILIZED, FOR SOLUTION INTRAVENOUS at 18:22

## 2020-09-27 RX ADMIN — SODIUM CHLORIDE 1000 ML: 9 INJECTION, SOLUTION INTRAVENOUS at 16:34

## 2020-09-27 RX ADMIN — LEVETIRACETAM 1000 MG: 10 INJECTION INTRAVENOUS at 20:38

## 2020-09-27 RX ADMIN — SODIUM CHLORIDE: 9 INJECTION, SOLUTION INTRAVENOUS at 17:46

## 2020-09-27 RX ADMIN — HUMAN INSULIN 5.5 UNITS/HR: 100 INJECTION, SOLUTION SUBCUTANEOUS at 16:34

## 2020-09-27 RX ADMIN — SODIUM CHLORIDE 1000 ML: 9 INJECTION, SOLUTION INTRAVENOUS at 17:46

## 2020-09-27 RX ADMIN — VANCOMYCIN HYDROCHLORIDE 1750 MG: 10 INJECTION, POWDER, LYOPHILIZED, FOR SOLUTION INTRAVENOUS at 19:03

## 2020-09-27 NOTE — ED PROVIDER NOTES
"ED Provider Note  [facility]      History     Chief Complaint   Patient presents with     Hyperglycemia     Nausea & Vomiting     The history is provided by the patient and medical records.     Isabell Matthews is a 60 year old female with a past medical history significant for insulin dependent diabetes with history of DKA, dementia, kidney disease, stroke, HTN, hyperlipidemia who presents here to the Emergency Department due to hyperglycemia, nausea, and vomiting.  Patient is accompanied by her daughter who is providing most of patient's history due to condition of patient and her dementia history.  Daughter reports glucometer showed error yesterday, and \"high\" readings today.  She states patient's blood glucose is normally around 250-300.  Patient has been getting her insulin, and got it last night.  Daughter states after \"high\" reading today, she smelt the sweet ketone smell on the patient's breath and was concerned about DKA.  She states patient was barely responsive when trying to eat bread and an orange, and had an episode of emesis shortly after.  Daughter gave patient 14 units of Humalog 3 hours ago. Daughter states patient has had increased episodes of incontinence due to her dementia getting worse.  She notes cloudy urine.  Daughter reports that patient had a fall 2 weeks ago.  States patient has been experience generalized weakness but no focal weakness.  Patient takes 81 mg of aspirin.  Patient denies nausea currently.  Patient has not had any fevers, cough, shortness of breath, or COVID exposures.  Patient does not have a history of abdominal surgery.  Patient has not had any seizures recently per daughter and has been taking all of her medications. Further history and review of systems is limited by patient's AMS.     Past Medical History  Past Medical History:   Diagnosis Date     Chronic pain      Essential hypertension      Ex-cigarette smoker     quit 7/2018 over 35 years     " Hyperlipidemia      Hypothyroid      Insulin-requiring or dependent type II diabetes mellitus (H)      Seizures (H)      Stroke (H)      Vascular dementia (H)      Past Surgical History:   Procedure Laterality Date     athroplasty hip Bilateral     ,           aspirin (ASA) 81 MG chewable tablet       atorvastatin (LIPITOR) 40 MG tablet       carvedilol (COREG) 3.125 MG tablet       cyanocobalamin (VITAMIN B-12) 1000 MCG tablet       hydrochlorothiazide (HYDRODIURIL) 25 MG tablet       insulin aspart (NOVOLOG PEN) 100 UNIT/ML pen       levETIRAcetam (KEPPRA) 500 MG tablet       Melatonin 10 MG TABS tablet       NIFEdipine ER (ADALAT CC) 60 MG 24 hr tablet       SYNTHROID 88 MCG tablet       Vitamin D3 (CHOLECALCIFEROL) 25 mcg (1000 units) tablet       insulin glargine (LANTUS PEN) 100 UNIT/ML pen       insulin pen needle (31G X 8 MM) 31G X 8 MM miscellaneous       loratadine (CLEAR-ATADINE) 10 MG tablet       pregabalin (LYRICA) 100 MG capsule      Allergies   Allergen Reactions     Pollen Extract Headache     Family History  No family history on file.  Social History   Social History     Tobacco Use     Smoking status: Former Smoker     Packs/day: 0.50     Years: 35.00     Pack years: 17.50     Types: Cigarettes     Quit date: 2018     Years since quittin.3     Smokeless tobacco: Never Used   Substance Use Topics     Alcohol use: Not Currently     Drug use: Not on file      Past medical history, past surgical history, medications, allergies, family history, and social history were reviewed with the patient. No additional pertinent items.       Review of Systems  A complete review of systems was attempted but limited due to altered mental status.    Physical Exam   BP: 121/62  Pulse: 83  Temp: 97.8  F (36.6  C)  Resp: 14  SpO2: 99 %  Physical Exam  Vitals signs reviewed.   Constitutional:       Appearance: She is well-developed. She is ill-appearing.      Comments: Eyes closed at rest but is  arousable to voice. Appears lethargic. Can answer directed questions with yes or no.    HENT:      Head: Normocephalic and atraumatic.      Mouth/Throat:      Mouth: Mucous membranes are dry.      Pharynx: No oropharyngeal exudate or posterior oropharyngeal erythema.   Eyes:      Extraocular Movements: Extraocular movements intact.      Conjunctiva/sclera: Conjunctivae normal.      Pupils: Pupils are equal, round, and reactive to light.   Neck:      Musculoskeletal: Normal range of motion and neck supple. No neck rigidity.   Cardiovascular:      Rate and Rhythm: Normal rate and regular rhythm.      Pulses: Normal pulses.      Heart sounds: Normal heart sounds. No murmur.   Pulmonary:      Effort: Pulmonary effort is normal. No respiratory distress.      Breath sounds: Normal breath sounds. No wheezing or rales.   Abdominal:      General: Bowel sounds are normal. There is no distension.      Palpations: Abdomen is soft. There is no mass.      Tenderness: There is abdominal tenderness. There is no guarding or rebound.      Comments: Generalized abdominal tenderness.   Musculoskeletal: Normal range of motion.         General: No swelling or tenderness.   Skin:     General: Skin is warm and dry.      Capillary Refill: Capillary refill takes less than 2 seconds.      Findings: No rash.   Neurological:      Mental Status: She is lethargic.      Cranial Nerves: Cranial nerves are intact. No cranial nerve deficit, dysarthria or facial asymmetry.      Sensory: Sensation is intact. No sensory deficit.      Motor: No pronator drift.      Comments: Moving all 4 extremities bilaterally spontaneously and able to roll over independently in bed. Sensation intact to all 4 extremities.    Psychiatric:         Mood and Affect: Mood normal.         ED Course     3:06 PM  The patient was seen and examined by Kyra Maciel MD in Room ED10.    Procedures             EKG Interpretation:      Interpreted by Kyra Maciel,  MD  Time reviewed: 4:30 pm  Symptoms at time of EKG: AMS   Rhythm: normal sinus   Rate: normal  Axis: normal  Ectopy: none  Conduction: normal  ST Segments/ T Waves: No ST-T wave changes  Q Waves: none  Comparison to prior: Unchanged    Clinical Impression: no evidence of acute ischemia              Critical Care Addendum    My initial assessment, based on my review of prehospital provider report, review of nursing observations, review of vital signs, focused history and physical exam, established that Isabell Matthews has suspicion for sepsis and need for evaluation and early goal-directed therapy and and DKA, which requires immediate intervention, and therefore she is critically ill.     After the initial assessment, the care team initiated multiple lab tests, initiated IV fluid administration and initiated medication therapy with IV antibiotics and IV insulin gtt to provide stabilization care. Due to the critical nature of this patient, I reassessed nursing observations, vital signs, physical exam, review of cardiac rhythm monitor and mental status multiple times prior to her disposition.     Time also spent performing documentation, discussion with family to obtain medical information for decision making, reviewing test results, discussion with consultants and coordination of care.     Critical care time (excluding teaching time and procedures): 45 minutes.   The patient has signs of Severe Sepsis        If one the following conditions is present, a 30 mL/kg bolus is recommended as part of the 6 hour bundle (IBW can be used for BMI >30, or document refusal/contraindication):      1.   Initial hypotension  defined as 2 bps < 90 or map < 65 in the 6hrs before or 6hrs after time zero.     2.  Lactate >4.     The patient has signs of Severe Sepsis as evidenced by:    1. 2 SIRS criteria, AND  2. Suspected infection, AND   3. Organ dysfunction: Lactic Acidosis with value >2.0    Time severe sepsis diagnosis confirmed: 1601   09/27/20 as this was the time when Lactate resulted, and the level was > 2.0    3 Hour Severe Sepsis Bundle Completion:    1. Initial Lactic Acid Result:   Recent Labs   Lab Test 09/27/20  1929 09/27/20  1601   LACT 1.4 2.2*     2. Blood Cultures before Antibiotics: Yes  3. Broad Spectrum Antibiotics Administered:  yes       Anti-infectives (From admission through now)    Start     Dose/Rate Route Frequency Ordered Stop    09/27/20 1750  vancomycin (VANCOCIN) 1,750 mg in sodium chloride 0.9 % 500 mL intermittent infusion      1,750 mg  over 2 Hours Intravenous ONCE 09/27/20 1746 09/27/20 2121    09/27/20 1650  piperacillin-tazobactam (ZOSYN) 3.375 g vial to attach to  mL bag      3.375 g  over 30 Minutes Intravenous ONCE 09/27/20 1646 09/27/20 1912          4. Fluid volume administered in ED:  Full 30 mL/kg bolus given (see amount below).    BMI Readings from Last 1 Encounters:   09/30/20 32.72 kg/m      30 mL/kg fluids based on weight: 2,360 mL  30 mL/kg fluids based on IBW (must be >= 60 inches tall): 1,430 mL                Severe Sepsis reassessment:  1. Repeat Lactic Acid Level: 1.4  2. MAP>65 after initial IVF bolus, will continue to monitor fluid status and vital signs          Labs Ordered and Resulted from Time of ED Arrival Up to the Time of Departure from the ED   CBC WITH PLATELETS DIFFERENTIAL - Abnormal; Notable for the following components:       Result Value    WBC 12.9 (*)     Hemoglobin 11.0 (*)     MCHC 31.3 (*)     RDW 15.1 (*)     Absolute Neutrophil 11.9 (*)     Absolute Lymphocytes 0.5 (*)     All other components within normal limits   COMPREHENSIVE METABOLIC PANEL - Abnormal; Notable for the following components:    Sodium 132 (*)     Carbon Dioxide 16 (*)     Anion Gap 18 (*)     Glucose 672 (*)     Urea Nitrogen 68 (*)     Creatinine 2.55 (*)     GFR Estimate 19 (*)     GFR Estimate If Black 23 (*)     Alkaline Phosphatase 221 (*)     AST 50 (*)     All other components within normal  limits   LIPASE - Abnormal; Notable for the following components:    Lipase 40 (*)     All other components within normal limits   LACTIC ACID WHOLE BLOOD - Abnormal; Notable for the following components:    Lactic Acid 2.2 (*)     All other components within normal limits   ROUTINE UA WITH MICROSCOPIC - Abnormal; Notable for the following components:    Glucose Urine >1000 (*)     Ketones Urine 40 (*)     Bacteria Urine Few (*)     Mucous Urine Present (*)     All other components within normal limits   KETONE BETA-HYDROXYBUTYRATE QUANTITATIVE - Abnormal; Notable for the following components:    Ketone Quantitative 4.9 (*)     All other components within normal limits   GLUCOSE BY METER - Abnormal; Notable for the following components:    Glucose >600 (*)     All other components within normal limits   HEMOGLOBIN A1C - Abnormal; Notable for the following components:    Hemoglobin A1C 11.7 (*)     All other components within normal limits   GLUCOSE BY METER - Abnormal; Notable for the following components:    Glucose 441 (*)     All other components within normal limits   GLUCOSE BY METER - Abnormal; Notable for the following components:    Glucose 398 (*)     All other components within normal limits   OSMOLALITY - Abnormal; Notable for the following components:    Osmolality 357 (*)     All other components within normal limits   GLUCOSE BY METER - Abnormal; Notable for the following components:    Glucose 314 (*)     All other components within normal limits   PROCALCITONIN - Abnormal; Notable for the following components:    Procalcitonin 5.54 (*)     All other components within normal limits   GLUCOSE BY METER - Abnormal; Notable for the following components:    Glucose 246 (*)     All other components within normal limits   GLUCOSE BY METER - Abnormal; Notable for the following components:    Glucose 220 (*)     All other components within normal limits   ISTAT GASES ELEC ICA GLUC ALEE POCT - Abnormal; Notable  for the following components:    Ph Venous 7.28 (*)     PCO2 Venous 38 (*)     Bicarbonate Venous 18 (*)     Sodium 132 (*)     Glucose 663 (*)     All other components within normal limits   INR   PARTIAL THROMBOPLASTIN TIME   TSH WITH FREE T4 REFLEX   GLUCOSE MONITOR NURSING POCT   GLUCOSE MONITOR NURSING POCT   PARTIAL THROMBOPLASTIN TIME   LACTIC ACID WHOLE BLOOD   ISTAT CG8 GAS ELEC ICA GLUC ALEE NURSE POCT   CARDIAC CONTINUOUS MONITORING   NOTIFY PHYSICIAN   PERIPHERAL IV CATHETER   IV ACCESS   IP ASSIGN PROVIDER TEAM TO TREATMENT TEAM   NOTIFY PHYSICIAN       Medications   0.9% sodium chloride BOLUS (0 mLs Intravenous Stopped 9/27/20 1744)   piperacillin-tazobactam (ZOSYN) 3.375 g vial to attach to  mL bag (0 g Intravenous Stopped 9/27/20 1912)   0.9% sodium chloride BOLUS (0 mLs Intravenous Stopped 9/27/20 2120)   vancomycin (VANCOCIN) 1,750 mg in sodium chloride 0.9 % 500 mL intermittent infusion (0 mg Intravenous Stopped 9/27/20 2121)   levETIRAcetam (KEPPRA) intermittent infusion 1,000 mg (1,000 mg Intravenous Given 9/27/20 2038)   potassium chloride (KLOR-CON) Packet 40 mEq (40 mEq Oral Given 9/28/20 0852)   potassium chloride (KLOR-CON) Packet 20 mEq (20 mEq Oral Given 9/29/20 1042)   Med Instruction - Transition from IV Insulin Infusion to Sub-Q Insulin ( Does not apply Given 9/29/20 2147)   insulin glargine (LANTUS PEN) injection 3 Units (3 Units Subcutaneous Given 9/30/20 0922)        Assessments & Plan (with Medical Decision Making)   Patient presents with altered mental status and hyperglycemia.  Here she is arousable to voice and does answer directed questions with yes or no.  She appears to be moving all 4 extremities spontaneously and has no focal deficits.  She does appear lethargic however.  Blood sugar is 663 with a pH of 7.28 and a PCO2 of 38 with a bicarb of 18.  I do have concern for DKA.  We were concerned about a possible infectious etiology as per her daughter she has been  receiving her insulin at home.  White blood cell count was elevated at 12.9 and lactic acid is 2.2 and thus I do have concern for possible sepsis.  Urinalysis is ordered.  Procalcitonin also came back elevated at 5.54.  She was given 30 mL/kg bolus of normal saline and started on empiric antibiotics with IV Zosyn and IV vancomycin.  She has missed her Keppra dose with her history of seizures and thus we discussed with pharmacy giving her an IV 1 g bolus here which they agreed with.  She does not have any signs of seizure activity and daughter states that typically this would be visible and she has not had any seizures at home.  She last had her seizure medication yesterday.  Her vital signs are within normal limits and she is afebrile.  With the concern of DKA we did start IV insulin drip as well.  We will be closely checking glucose every 1 hour.  With the IV insulin this was improving. after IV fluids lactate also improved to 1.4.  Creatinine is 2.55.  Ketones are elevated at 4.9 urinalysis is pending.  She has some generalized abdominal tenderness on exam without signs of peritonitis.  Will obtain CT of the abdomen pelvis without contrast for further evaluation.  We also obtain a chest x-ray and COVID-19 testing.  CT of the head was also obtained.  EKG was obtained which did not reveal any evidence of acute ischemia.    CT of the head was unremarkable for any acute pathology.  Chest x-ray was also unremarkable.  CT the abdomen pelvis revealed stones in the kidney but no ureteral stone or sign of obstruction.  There is also a likely cyst that could be further evaluated on repeat evaluation.  I did discuss with the patient's daughter.  Plan at this time is to admit the patient to [facility] bed for further treatment of DKA and possible sepsis.  Patient is becoming more alert with the insulin drip.  Patient and her daughter were in agreement with this plan.  She was transferred to the medicine service in stable  condition.    I have reviewed the nursing notes. I have reviewed the findings, diagnosis, plan and need for follow up with the patient.    New Prescriptions    No medications on file       Final diagnoses:   Diabetic ketoacidosis without coma associated with type 1 diabetes mellitus (H)       --  Kyra Maciel MD  [facility], [place], EMERGENCY DEPARTMENT  9/27/2020     Kyra Maciel MD  10/27/20 1225

## 2020-09-27 NOTE — PHARMACY-ADMISSION MEDICATION HISTORY
Admission Medication History Completed by Pharmacy    See [facility] Admission Navigator for allergy information, preferred outpatient pharmacy, prior to admission medications and immunization status.     Medication History Sources:     Patient's daughter (Charli), 9/2 primary care note    Changes made to PTA medication list (reason):    Added: Melatonin    Deleted: vitamin B6 (pt does not use), vit D 5000 unit (pt uses 1000 unit tablet)    Changed: None    Additional Information:    Patient's daughter was a good historian and was able to report medication names, doses, frequencies, and last doses. Patient usually gets her medications at 10am and 10pm.    Prior to Admission medications    Medication Sig Last Dose Taking? Auth Provider   aspirin (ASA) 81 MG chewable tablet Take 1 tablet (81 mg) by mouth daily 9/26/2020 at Unknown time Yes Bony Castaneda MD   atorvastatin (LIPITOR) 40 MG tablet Take 1 tablet (40 mg) by mouth daily 9/26/2020 at Unknown time Yes Bony Castaneda MD   carvedilol (COREG) 3.125 MG tablet Take 1 tablet (3.125 mg) by mouth 2 times daily (with meals) 9/26/2020 at Unknown time Yes Bony Castaneda MD   cyanocobalamin (VITAMIN B-12) 1000 MCG tablet Take 1 tablet (1,000 mcg) by mouth daily 9/26/2020 at Unknown time Yes Bony Castaneda MD   hydrochlorothiazide (HYDRODIURIL) 25 MG tablet Take 1 tablet (25 mg) by mouth daily 9/26/2020 at Unknown time Yes Bony Castaneda MD   insulin glargine (LANTUS PEN) 100 UNIT/ML pen Inject 18 Units Subcutaneous At Bedtime 9/26/2020 at Unknown time Yes Bony Castaneda MD   insulin lispro (HUMALOG KWIKPEN) 100 UNIT/ML (1 unit dial) KWIKPEN 4 units with food plus sliding scale see below usual dose 24 to 48 daily 9/27/2020 at Unknown time Yes Bony Castaneda MD   levETIRAcetam (KEPPRA) 500 MG tablet Take 1 tablet (500 mg) by mouth 2 times daily 9/26/2020 at Unknown time Yes Bony Castaneda MD   Melatonin 10 MG TABS tablet Take 10  mg by mouth At Bedtime 9/26/2020 at Unknown time Yes Unknown, Entered By History   NIFEdipine ER (ADALAT CC) 60 MG 24 hr tablet Take 1 tablet (60 mg) by mouth daily 9/26/2020 at Unknown time Yes Bony Castaneda MD   pregabalin (LYRICA) 100 MG capsule Take 1 capsule (100 mg) by mouth 2 times daily 9/26/2020 at Unknown time Yes Bony Castaneda MD   SYNTHROID 88 MCG tablet Take 1 tablet (88 mcg) by mouth daily 9/26/2020 at Unknown time Yes Bony Castaneda MD   Vitamin D3 (CHOLECALCIFEROL) 25 mcg (1000 units) tablet Take 1 tablet (25 mcg) by mouth daily 9/26/2020 at Unknown time Yes Bony Castaneda MD   insulin pen needle (31G X 8 MM) 31G X 8 MM miscellaneous Use 4 pen needles daily or as directed.   Bony Castaneda MD       Date completed: 09/27/20    Medication history completed by: Catherine Werner, Pharmacy Resident

## 2020-09-27 NOTE — ED NOTES
[facility], [place]   ED Nurse to Floor Handoff     Isabell Matthews is a 60 year old female who speaks English and lives with family members,  in a home  They arrived in the ED by car from home    ED Chief Complaint: Hyperglycemia and Nausea & Vomiting    ED Dx;   Final diagnoses:   None         Needed?: No    Allergies:   Allergies   Allergen Reactions     Pollen Extract Headache   .  Past Medical Hx:   Past Medical History:   Diagnosis Date     Chronic pain      Essential hypertension      Ex-cigarette smoker     quit 7/2018 over 35 years     Hyperlipidemia      Hypothyroid      Insulin-requiring or dependent type II diabetes mellitus (H)      Seizures (H)      Stroke (H)      Vascular dementia (H)       Baseline Mental status: mild dementia  Current Mental Status changes: other altered mental status    Infection present or suspected this encounter: yes other unknown @ this time  Sepsis suspected: Yes  Isolation type: No active isolations     Activity level - Baseline/Home:  Stand with Assist  Activity Level - Current:   Stand with Assist    Bariatric equipment needed?: No    In the ED these meds were given:   Medications   0.9% sodium chloride BOLUS (0 mLs Intravenous Stopped 9/27/20 1744)     Followed by   sodium chloride 0.9% infusion ( Intravenous New Bag 9/27/20 1746)   dextrose 5% and 0.45% NaCl infusion (has no administration in time range)   dextrose 50 % injection 25-50 mL (has no administration in time range)   insulin 1 unit/1 mL in NS (NovoLIN, HumuLIN Regular) drip -ED DKA algorithm (5.5 Units/kg/hr (Dosing Weight) Intravenous Rate/Dose Verify 9/27/20 1729)   piperacillin-tazobactam (ZOSYN) 3.375 g vial to attach to  mL bag (has no administration in time range)   0.9% sodium chloride BOLUS (1,000 mLs Intravenous New Bag 9/27/20 1746)   vancomycin (VANCOCIN) 1,750 mg in sodium chloride 0.9 % 500 mL intermittent infusion (has no administration in time range)    vancomycin place james - receiving intermittent dosing (has no administration in time range)       Drips running?  Yes    Home pump  No    Current LDAs  Peripheral IV 09/27/20 Left;Anterior;Lateral Lower forearm (Active)   Number of days: 0       Urethral Catheter Temperature probe (Active)   Catheter Care Done;Catheter wipes;Soap and water/perineal cleanser 09/27/20 1813   Collection Container Standard 09/27/20 1813   Securement Method Securing device (Describe) 09/27/20 1813   Rationale for Continued Use Strict 1-2 Hour I&O 09/27/20 1813   Number of days: 0       Labs results:   Labs Ordered and Resulted from Time of ED Arrival Up to the Time of Departure from the ED   CBC WITH PLATELETS DIFFERENTIAL - Abnormal; Notable for the following components:       Result Value    WBC 12.9 (*)     Hemoglobin 11.0 (*)     MCHC 31.3 (*)     RDW 15.1 (*)     Absolute Neutrophil 11.9 (*)     Absolute Lymphocytes 0.5 (*)     All other components within normal limits   COMPREHENSIVE METABOLIC PANEL - Abnormal; Notable for the following components:    Sodium 132 (*)     Carbon Dioxide 16 (*)     Anion Gap 18 (*)     Glucose 672 (*)     Urea Nitrogen 68 (*)     Creatinine 2.55 (*)     GFR Estimate 19 (*)     GFR Estimate If Black 23 (*)     Alkaline Phosphatase 221 (*)     AST 50 (*)     All other components within normal limits   LIPASE - Abnormal; Notable for the following components:    Lipase 40 (*)     All other components within normal limits   LACTIC ACID WHOLE BLOOD - Abnormal; Notable for the following components:    Lactic Acid 2.2 (*)     All other components within normal limits   KETONE BETA-HYDROXYBUTYRATE QUANTITATIVE - Abnormal; Notable for the following components:    Ketone Quantitative 4.9 (*)     All other components within normal limits   GLUCOSE BY METER - Abnormal; Notable for the following components:    Glucose >600 (*)     All other components within normal limits   HEMOGLOBIN A1C - Abnormal; Notable  for the following components:    Hemoglobin A1C 11.7 (*)     All other components within normal limits   GLUCOSE BY METER - Abnormal; Notable for the following components:    Glucose 441 (*)     All other components within normal limits   ISTAT GASES ELEC ICA GLUC ALEE POCT - Abnormal; Notable for the following components:    Ph Venous 7.28 (*)     PCO2 Venous 38 (*)     Bicarbonate Venous 18 (*)     Sodium 132 (*)     Glucose 663 (*)     All other components within normal limits   INR   PARTIAL THROMBOPLASTIN TIME   TSH WITH FREE T4 REFLEX   GLUCOSE MONITOR NURSING POCT   COVID-19 VIRUS (CORONAVIRUS) BY PCR   GLUCOSE MONITOR NURSING POCT   PARTIAL THROMBOPLASTIN TIME   ROUTINE UA WITH MICROSCOPIC   OSMOLALITY   ISTAT CG8 GAS ELEC ICA GLUC ALEE NURSE POCT   CARDIAC CONTINUOUS MONITORING   NOTIFY PHYSICIAN   PERIPHERAL IV CATHETER   IV ACCESS   GLUCOSE BY METER POCT   GLUCOSE BY METER POCT   GLUCOSE BY METER POCT   BLOOD CULTURE   BLOOD CULTURE   URINE CULTURE AEROBIC BACTERIAL       Imaging Studies:   Recent Results (from the past 24 hour(s))   XR Chest Port 1 View    Narrative    Portable chest    INDICATION: AMS, concern for infection, evaluate for pathology    COMPARISON: None    FINDINGS: Heart size and shape appear normal. There is aortic arch  atherosclerosis. Lungs and pulmonary vascularity appear normal. Bony  structures appear grossly intact.      Impression    IMPRESSION: Aortic atherosclerosis.    ASIF LUNA MD       Recent vital signs:   BP (!) 142/66   Pulse 82   Temp 97.8  F (36.6  C) (Oral)   Resp 13   Wt 69.8 kg (153 lb 12.8 oz)   SpO2 97%     [place] Coma Scale Score: 13 (09/27/20 1558)       Cardiac Rhythm: Normal Sinus  Pt needs tele? Yes  Skin/wound Issues: None    Code Status: Full Code    Pain control: pt had none    Nausea control: pt had none    Abnormal labs/tests/findings requiring intervention: See labs    Family present during ED course? Yes   Family Comments/Social Situation  comments: Pt lives with daughter who is also her caregiver. Supportive daughter @ bedside.    Tasks needing completion: Attempting to establish second PIV for abx    Amber Patricia    [phone] [facility] ED

## 2020-09-27 NOTE — ED TRIAGE NOTES
"Pt c/o hyperglycemia    Daughter states glucometer showed error yesterday, and \"high\" readings today. 14 units humalog 3 hrs ago    Has noticed increased Incontinence w/ cloudy urine, nausea/vomiting, increased confusion/lethargy    Glucose reads \"high\" in triage, VSS  Hx stroke and seizures, vascular dementia  "

## 2020-09-28 LAB
AMPHETAMINES UR QL SCN: NEGATIVE
ANION GAP SERPL CALCULATED.3IONS-SCNC: 8 MMOL/L (ref 3–14)
ANION GAP SERPL CALCULATED.3IONS-SCNC: 8 MMOL/L (ref 3–14)
ANION GAP SERPL CALCULATED.3IONS-SCNC: 9 MMOL/L (ref 3–14)
BACTERIA SPEC CULT: NO GROWTH
BARBITURATES UR QL: NEGATIVE
BASE DEFICIT BLDV-SCNC: 4.1 MMOL/L
BENZODIAZ UR QL: NEGATIVE
BUN SERPL-MCNC: 29 MG/DL (ref 7–30)
BUN SERPL-MCNC: 31 MG/DL (ref 7–30)
BUN SERPL-MCNC: 44 MG/DL (ref 7–30)
CALCIUM SERPL-MCNC: 7.9 MG/DL (ref 8.5–10.1)
CALCIUM SERPL-MCNC: 8.2 MG/DL (ref 8.5–10.1)
CALCIUM SERPL-MCNC: 8.3 MG/DL (ref 8.5–10.1)
CANNABINOIDS UR QL SCN: NEGATIVE
CHLORIDE SERPL-SCNC: 112 MMOL/L (ref 94–109)
CHLORIDE SERPL-SCNC: 114 MMOL/L (ref 94–109)
CHLORIDE SERPL-SCNC: 115 MMOL/L (ref 94–109)
CO2 SERPL-SCNC: 18 MMOL/L (ref 20–32)
CO2 SERPL-SCNC: 20 MMOL/L (ref 20–32)
CO2 SERPL-SCNC: 21 MMOL/L (ref 20–32)
COCAINE UR QL: NEGATIVE
CREAT SERPL-MCNC: 1.78 MG/DL (ref 0.52–1.04)
CREAT SERPL-MCNC: 1.81 MG/DL (ref 0.52–1.04)
CREAT SERPL-MCNC: 1.85 MG/DL (ref 0.52–1.04)
ERYTHROCYTE [DISTWIDTH] IN BLOOD BY AUTOMATED COUNT: 15.2 % (ref 10–15)
ETHANOL UR QL SCN: NEGATIVE
GFR SERPL CREATININE-BSD FRML MDRD: 29 ML/MIN/{1.73_M2}
GFR SERPL CREATININE-BSD FRML MDRD: 29 ML/MIN/{1.73_M2}
GFR SERPL CREATININE-BSD FRML MDRD: 30 ML/MIN/{1.73_M2}
GLUCOSE BLDC GLUCOMTR-MCNC: 101 MG/DL (ref 70–99)
GLUCOSE BLDC GLUCOMTR-MCNC: 106 MG/DL (ref 70–99)
GLUCOSE BLDC GLUCOMTR-MCNC: 122 MG/DL (ref 70–99)
GLUCOSE BLDC GLUCOMTR-MCNC: 124 MG/DL (ref 70–99)
GLUCOSE BLDC GLUCOMTR-MCNC: 132 MG/DL (ref 70–99)
GLUCOSE BLDC GLUCOMTR-MCNC: 133 MG/DL (ref 70–99)
GLUCOSE BLDC GLUCOMTR-MCNC: 140 MG/DL (ref 70–99)
GLUCOSE BLDC GLUCOMTR-MCNC: 141 MG/DL (ref 70–99)
GLUCOSE BLDC GLUCOMTR-MCNC: 141 MG/DL (ref 70–99)
GLUCOSE BLDC GLUCOMTR-MCNC: 143 MG/DL (ref 70–99)
GLUCOSE BLDC GLUCOMTR-MCNC: 147 MG/DL (ref 70–99)
GLUCOSE BLDC GLUCOMTR-MCNC: 155 MG/DL (ref 70–99)
GLUCOSE BLDC GLUCOMTR-MCNC: 155 MG/DL (ref 70–99)
GLUCOSE BLDC GLUCOMTR-MCNC: 169 MG/DL (ref 70–99)
GLUCOSE BLDC GLUCOMTR-MCNC: 173 MG/DL (ref 70–99)
GLUCOSE BLDC GLUCOMTR-MCNC: 175 MG/DL (ref 70–99)
GLUCOSE BLDC GLUCOMTR-MCNC: 180 MG/DL (ref 70–99)
GLUCOSE BLDC GLUCOMTR-MCNC: 208 MG/DL (ref 70–99)
GLUCOSE BLDC GLUCOMTR-MCNC: 213 MG/DL (ref 70–99)
GLUCOSE BLDC GLUCOMTR-MCNC: 216 MG/DL (ref 70–99)
GLUCOSE BLDC GLUCOMTR-MCNC: 221 MG/DL (ref 70–99)
GLUCOSE BLDC GLUCOMTR-MCNC: 250 MG/DL (ref 70–99)
GLUCOSE BLDC GLUCOMTR-MCNC: 256 MG/DL (ref 70–99)
GLUCOSE BLDC GLUCOMTR-MCNC: 313 MG/DL (ref 70–99)
GLUCOSE BLDC GLUCOMTR-MCNC: 66 MG/DL (ref 70–99)
GLUCOSE BLDC GLUCOMTR-MCNC: 78 MG/DL (ref 70–99)
GLUCOSE BLDC GLUCOMTR-MCNC: 81 MG/DL (ref 70–99)
GLUCOSE SERPL-MCNC: 178 MG/DL (ref 70–99)
GLUCOSE SERPL-MCNC: 202 MG/DL (ref 70–99)
GLUCOSE SERPL-MCNC: 253 MG/DL (ref 70–99)
HCO3 BLDV-SCNC: 21 MMOL/L (ref 21–28)
HCT VFR BLD AUTO: 31.4 % (ref 35–47)
HGB BLD-MCNC: 9.7 G/DL (ref 11.7–15.7)
INTERPRETATION ECG - MUSE: NORMAL
INTERPRETATION ECG - MUSE: NORMAL
KETONES BLD-SCNC: 0.1 MMOL/L (ref 0–0.6)
KETONES BLD-SCNC: 1.4 MMOL/L (ref 0–0.6)
KETONES BLD-SCNC: 1.6 MMOL/L (ref 0–0.6)
KETONES BLD-SCNC: 1.8 MMOL/L (ref 0–0.6)
LABORATORY COMMENT REPORT: NORMAL
Lab: NORMAL
MCH RBC QN AUTO: 26.6 PG (ref 26.5–33)
MCHC RBC AUTO-ENTMCNC: 30.9 G/DL (ref 31.5–36.5)
MCV RBC AUTO: 86 FL (ref 78–100)
O2/TOTAL GAS SETTING VFR VENT: ABNORMAL %
OPIATES UR QL SCN: NEGATIVE
OSMOLALITY SERPL: 318 MMOL/KG (ref 280–301)
PCO2 BLDV: 37 MM HG (ref 40–50)
PCP UR QL SCN: NEGATIVE
PH BLDV: 7.36 PH (ref 7.32–7.43)
PLATELET # BLD AUTO: 194 10E9/L (ref 150–450)
PO2 BLDV: 54 MM HG (ref 25–47)
POTASSIUM SERPL-SCNC: 3.2 MMOL/L (ref 3.4–5.3)
POTASSIUM SERPL-SCNC: 3.6 MMOL/L (ref 3.4–5.3)
POTASSIUM SERPL-SCNC: 3.8 MMOL/L (ref 3.4–5.3)
RBC # BLD AUTO: 3.65 10E12/L (ref 3.8–5.2)
SARS-COV-2 RNA SPEC QL NAA+PROBE: NEGATIVE
SARS-COV-2 RNA SPEC QL NAA+PROBE: NORMAL
SODIUM SERPL-SCNC: 141 MMOL/L (ref 133–144)
SODIUM SERPL-SCNC: 142 MMOL/L (ref 133–144)
SODIUM SERPL-SCNC: 142 MMOL/L (ref 133–144)
SPECIMEN SOURCE: NORMAL
WBC # BLD AUTO: 8.1 10E9/L (ref 4–11)

## 2020-09-28 PROCEDURE — 25000128 H RX IP 250 OP 636: Performed by: STUDENT IN AN ORGANIZED HEALTH CARE EDUCATION/TRAINING PROGRAM

## 2020-09-28 PROCEDURE — 36415 COLL VENOUS BLD VENIPUNCTURE: CPT | Performed by: PHYSICIAN ASSISTANT

## 2020-09-28 PROCEDURE — 80048 BASIC METABOLIC PNL TOTAL CA: CPT | Performed by: PHYSICIAN ASSISTANT

## 2020-09-28 PROCEDURE — 82010 KETONE BODYS QUAN: CPT | Performed by: PHYSICIAN ASSISTANT

## 2020-09-28 PROCEDURE — 82010 KETONE BODYS QUAN: CPT | Performed by: STUDENT IN AN ORGANIZED HEALTH CARE EDUCATION/TRAINING PROGRAM

## 2020-09-28 PROCEDURE — 25800025 ZZH RX 258: Performed by: PHYSICIAN ASSISTANT

## 2020-09-28 PROCEDURE — 85027 COMPLETE CBC AUTOMATED: CPT | Performed by: PHYSICIAN ASSISTANT

## 2020-09-28 PROCEDURE — 40000141 ZZH STATISTIC PERIPHERAL IV START W/O US GUIDANCE

## 2020-09-28 PROCEDURE — 12000004 ZZH R&B IMCU UMMC

## 2020-09-28 PROCEDURE — 84132 ASSAY OF SERUM POTASSIUM: CPT | Performed by: PHYSICIAN ASSISTANT

## 2020-09-28 PROCEDURE — 83930 ASSAY OF BLOOD OSMOLALITY: CPT | Performed by: STUDENT IN AN ORGANIZED HEALTH CARE EDUCATION/TRAINING PROGRAM

## 2020-09-28 PROCEDURE — 25000128 H RX IP 250 OP 636: Performed by: PHYSICIAN ASSISTANT

## 2020-09-28 PROCEDURE — 82803 BLOOD GASES ANY COMBINATION: CPT | Performed by: PHYSICIAN ASSISTANT

## 2020-09-28 PROCEDURE — 25800030 ZZH RX IP 258 OP 636: Performed by: PHYSICIAN ASSISTANT

## 2020-09-28 PROCEDURE — 25000131 ZZH RX MED GY IP 250 OP 636 PS 637: Performed by: CLINICAL NURSE SPECIALIST

## 2020-09-28 PROCEDURE — 25000125 ZZHC RX 250: Performed by: PHYSICIAN ASSISTANT

## 2020-09-28 PROCEDURE — 25000131 ZZH RX MED GY IP 250 OP 636 PS 637: Mod: GY | Performed by: PHYSICIAN ASSISTANT

## 2020-09-28 PROCEDURE — 00000146 ZZHCL STATISTIC GLUCOSE BY METER IP

## 2020-09-28 PROCEDURE — 99233 SBSQ HOSP IP/OBS HIGH 50: CPT | Performed by: STUDENT IN AN ORGANIZED HEALTH CARE EDUCATION/TRAINING PROGRAM

## 2020-09-28 PROCEDURE — 25000132 ZZH RX MED GY IP 250 OP 250 PS 637: Performed by: PHYSICIAN ASSISTANT

## 2020-09-28 PROCEDURE — 36415 COLL VENOUS BLD VENIPUNCTURE: CPT | Performed by: STUDENT IN AN ORGANIZED HEALTH CARE EDUCATION/TRAINING PROGRAM

## 2020-09-28 PROCEDURE — 25000132 ZZH RX MED GY IP 250 OP 250 PS 637: Mod: GY | Performed by: HOSPITALIST

## 2020-09-28 RX ORDER — PIPERACILLIN SODIUM, TAZOBACTAM SODIUM 3; .375 G/15ML; G/15ML
3.38 INJECTION, POWDER, LYOPHILIZED, FOR SOLUTION INTRAVENOUS EVERY 6 HOURS
Status: DISCONTINUED | OUTPATIENT
Start: 2020-09-28 | End: 2020-09-29

## 2020-09-28 RX ORDER — POTASSIUM CHLORIDE 1.5 G/1.58G
40 POWDER, FOR SOLUTION ORAL ONCE
Status: COMPLETED | OUTPATIENT
Start: 2020-09-28 | End: 2020-09-28

## 2020-09-28 RX ORDER — DEXTROSE MONOHYDRATE, SODIUM CHLORIDE, AND POTASSIUM CHLORIDE 50; 2.24; 4.5 G/1000ML; G/1000ML; G/1000ML
INJECTION, SOLUTION INTRAVENOUS CONTINUOUS
Status: DISCONTINUED | OUTPATIENT
Start: 2020-09-28 | End: 2020-09-29

## 2020-09-28 RX ORDER — PIPERACILLIN SODIUM, TAZOBACTAM SODIUM 3; .375 G/15ML; G/15ML
3.38 INJECTION, POWDER, LYOPHILIZED, FOR SOLUTION INTRAVENOUS EVERY 6 HOURS
Status: DISCONTINUED | OUTPATIENT
Start: 2020-09-28 | End: 2020-09-28

## 2020-09-28 RX ORDER — SODIUM CHLORIDE, SODIUM LACTATE, POTASSIUM CHLORIDE, CALCIUM CHLORIDE 600; 310; 30; 20 MG/100ML; MG/100ML; MG/100ML; MG/100ML
INJECTION, SOLUTION INTRAVENOUS CONTINUOUS
Status: DISCONTINUED | OUTPATIENT
Start: 2020-09-28 | End: 2020-09-28

## 2020-09-28 RX ADMIN — CARVEDILOL 3.12 MG: 3.12 TABLET, FILM COATED ORAL at 17:25

## 2020-09-28 RX ADMIN — CYANOCOBALAMIN TAB 1000 MCG 1000 MCG: 1000 TAB at 09:07

## 2020-09-28 RX ADMIN — PREGABALIN 100 MG: 100 CAPSULE ORAL at 00:38

## 2020-09-28 RX ADMIN — LEVETIRACETAM 500 MG: 500 TABLET ORAL at 08:54

## 2020-09-28 RX ADMIN — HEPARIN SODIUM 5000 UNITS: 5000 INJECTION, SOLUTION INTRAVENOUS; SUBCUTANEOUS at 00:37

## 2020-09-28 RX ADMIN — HUMAN INSULIN: 100 INJECTION, SOLUTION SUBCUTANEOUS at 08:19

## 2020-09-28 RX ADMIN — POTASSIUM CHLORIDE 40 MEQ: 1.5 POWDER, FOR SOLUTION ORAL at 08:52

## 2020-09-28 RX ADMIN — HUMAN INSULIN: 100 INJECTION, SOLUTION SUBCUTANEOUS at 22:36

## 2020-09-28 RX ADMIN — NIFEDIPINE 60 MG: 30 TABLET, FILM COATED, EXTENDED RELEASE ORAL at 08:54

## 2020-09-28 RX ADMIN — CARVEDILOL 3.12 MG: 3.12 TABLET, FILM COATED ORAL at 08:55

## 2020-09-28 RX ADMIN — PIPERACILLIN SODIUM AND TAZOBACTAM SODIUM 3.38 G: 3; .375 INJECTION, POWDER, LYOPHILIZED, FOR SOLUTION INTRAVENOUS at 00:38

## 2020-09-28 RX ADMIN — HEPARIN SODIUM 5000 UNITS: 5000 INJECTION, SOLUTION INTRAVENOUS; SUBCUTANEOUS at 08:58

## 2020-09-28 RX ADMIN — Medication 25 MCG: at 08:58

## 2020-09-28 RX ADMIN — PREGABALIN 100 MG: 100 CAPSULE ORAL at 20:34

## 2020-09-28 RX ADMIN — CARVEDILOL 3.12 MG: 3.12 TABLET, FILM COATED ORAL at 00:38

## 2020-09-28 RX ADMIN — HYDROCHLOROTHIAZIDE 25 MG: 25 TABLET ORAL at 08:56

## 2020-09-28 RX ADMIN — INSULIN ASPART 1 UNITS: 100 INJECTION, SOLUTION INTRAVENOUS; SUBCUTANEOUS at 17:25

## 2020-09-28 RX ADMIN — HEPARIN SODIUM 5000 UNITS: 5000 INJECTION, SOLUTION INTRAVENOUS; SUBCUTANEOUS at 20:35

## 2020-09-28 RX ADMIN — PREGABALIN 100 MG: 100 CAPSULE ORAL at 08:53

## 2020-09-28 RX ADMIN — DEXTROSE AND SODIUM CHLORIDE 1000 ML: 5; 450 INJECTION, SOLUTION INTRAVENOUS at 10:52

## 2020-09-28 RX ADMIN — DEXTROSE AND SODIUM CHLORIDE 1000 ML: 5; 450 INJECTION, SOLUTION INTRAVENOUS at 02:08

## 2020-09-28 RX ADMIN — PIPERACILLIN SODIUM AND TAZOBACTAM SODIUM 3.38 G: 3; .375 INJECTION, POWDER, LYOPHILIZED, FOR SOLUTION INTRAVENOUS at 12:14

## 2020-09-28 RX ADMIN — SODIUM CHLORIDE, POTASSIUM CHLORIDE, SODIUM LACTATE AND CALCIUM CHLORIDE: 600; 310; 30; 20 INJECTION, SOLUTION INTRAVENOUS at 02:08

## 2020-09-28 RX ADMIN — LEVOTHYROXINE SODIUM 88 MCG: 88 TABLET ORAL at 08:56

## 2020-09-28 RX ADMIN — PIPERACILLIN SODIUM AND TAZOBACTAM SODIUM 3.38 G: 3; .375 INJECTION, POWDER, LYOPHILIZED, FOR SOLUTION INTRAVENOUS at 22:41

## 2020-09-28 RX ADMIN — LEVETIRACETAM 500 MG: 500 TABLET ORAL at 20:44

## 2020-09-28 RX ADMIN — DEXTROSE AND SODIUM CHLORIDE 1000 ML: 5; 450 INJECTION, SOLUTION INTRAVENOUS at 05:59

## 2020-09-28 RX ADMIN — PIPERACILLIN SODIUM AND TAZOBACTAM SODIUM 3.38 G: 3; .375 INJECTION, POWDER, LYOPHILIZED, FOR SOLUTION INTRAVENOUS at 17:17

## 2020-09-28 RX ADMIN — ASPIRIN 81 MG CHEWABLE TABLET 81 MG: 81 TABLET CHEWABLE at 08:58

## 2020-09-28 RX ADMIN — PIPERACILLIN SODIUM AND TAZOBACTAM SODIUM 3.38 G: 3; .375 INJECTION, POWDER, LYOPHILIZED, FOR SOLUTION INTRAVENOUS at 08:04

## 2020-09-28 ASSESSMENT — ACTIVITIES OF DAILY LIVING (ADL)
RETIRED_EATING: 0-->INDEPENDENT
ADLS_ACUITY_SCORE: 17
ADLS_ACUITY_SCORE: 17
AMBULATION: 2-->ASSISTIVE PERSON
TRANSFERRING: 2-->ASSISTIVE PERSON
WHICH_OF_THE_ABOVE_FUNCTIONAL_RISKS_HAD_A_RECENT_ONSET_OR_CHANGE?: AMBULATION
BATHING: 3-->ASSISTIVE EQUIPMENT AND PERSON
ADLS_ACUITY_SCORE: 17
FALL_HISTORY_WITHIN_LAST_SIX_MONTHS: YES
ADLS_ACUITY_SCORE: 17
ADLS_ACUITY_SCORE: 17
RETIRED_COMMUNICATION: 0-->UNDERSTANDS/COMMUNICATES WITHOUT DIFFICULTY
COGNITION: 1 - ATTENTION OR MEMORY DEFICITS
TOILETING: 3-->ASSISTIVE EQUIPMENT AND PERSON
ADLS_ACUITY_SCORE: 26
SWALLOWING: 0-->SWALLOWS FOODS/LIQUIDS WITHOUT DIFFICULTY
DRESS: 3-->ASSISTIVE EQUIPMENT AND PERSON

## 2020-09-28 ASSESSMENT — PAIN DESCRIPTION - DESCRIPTORS
DESCRIPTORS: ACHING
DESCRIPTORS: ACHING

## 2020-09-28 NOTE — PROVIDER NOTIFICATION
Paged Team- [facility] Richelle    6B: Regla Matthewsnda [id number]  pt blood sugar has been under 150 for two consecutive readings 133 and 124 checked an additional time and was 141.   Currently on DKA Alg 1 protocol at 0.5unit/hr     DELONTE Rae [id number]

## 2020-09-28 NOTE — PLAN OF CARE
/65 (BP Location: Right arm)   Pulse 69   Temp 97.2  F (36.2  C) (Axillary)   Resp 14   Wt 76.6 kg (168 lb 14 oz)   SpO2 98%     Neuro: Ox2, disoriented to time and situation, arouses to voice but sleepy   Cardiac: HR 70's BP's 120's to 130's/ 50's-60's   Respiratory:  Maintaining adequate O2 sats on RA  GI/: voiding via catheter, Small smear BM during shift   Diet/appetite:  CHO Diet  Activity:  helper pt up to bedside after dangling, A1  Pain: pt reported mild neck pain, applied heat, otherwise denies pain  Skin: rash to groin area (added LDA in flow sheets), and possible cysts on back (noted in flow sheet), blanchable redness on bottom- preventative mepi appllied  LDA's:     L PIV  R-PIV    Plan: continue with plan of care to decrease bloog sugar levels and wean of DKA insulin algorithm. Nursing will continue to follow the POC and update the MD team with concerns.    Kyra Rodrigues RN  6B Intermediate Care Unit

## 2020-09-28 NOTE — PROGRESS NOTES
[facility] Progress Note - Hospitalist Service, Gold 8       Date of Admission:  9/27/2020  Assessment & Plan       Isabell Matthews is a 60 year old female with poorly controlled IDDM-type 2, history of DKA, vascular dementia, CKD stage 3, history of stroke, history of MI/ CAD, hypothyroidism, seizure disorder, degenerative arthritis, HTN, HLD admitted on 9/27/2020 with altered mental status and glucoses that could not be controlled at home, found to be in DKA.      Diabetic ketoacidosis with mild lactic acidosis - improving  Poorly controlled IDDM (unclear if Type 1 or Type 2)  Pseudohyponatremia  Acidotic on admission with + ketones and VBG: pH 7.28, pCO2 38, bicarb 18. Lactate 2.2-->1.4. HbA1C on admission 11.7, glucose initially in 600s and started on insulin drip in ED. PTA she is on insulin glargine 18 units at bedtime, and insulin aspart 4 units with meals + sliding scale. Unclear precipitating factors, per daughter was getting greater than her usual doses of insulin with additional 14 units additional insulin at a time. Suspect may be due to defective insulin, versus underlying infection, versus poor insulin absorption, versus much less likely insulinoma. S/p vanc. Procal elevated however difficult to interpret in setting of CKD. No respiratory symptoms. UA negative for infection.    - Endocrinology DM team consulted, appreciate assistance   - DM Educator consulted, appreciate assistance   - Insulin gtt, will transition to oral per endo recommendations   - moderate CHO diet   - LR @125cc/hr x 10hrs   - Continue zosyn pending ABUS given elevated transaminases and procal   - follow up blood cx x 2   - ABUS to rule out intraabdominal infection     Nausea and vomiting - resolved  Suspect this is secondary to DKA +/- underlying infection.  Not present on exam. Urine tox screen negative   - prn zofran     EVON CKD stage 3-4   History of incomplete bladder  emptying  Interpolar renal lesion  Cr 2.5 on admission and prior in 2018 Cr was 1.8. Per daughter Cr is normally 2-3 has been as high as 4-5.  UA c/w glucosuria (>1000mg/dl), ketonuria. Per daughter, pt with known history of bladder scans in the 500s and is not typically managed with a li catheter. Exam negative for CVA tenderness. CT A/P in ED shows a 3.1x2.6cm interpolar left renal mass with mild left perinephritic stranding and li catheter present in bladder on exam.    - Will trial voiding after li post insulin drip, pull li in AM    - when li out, bladder scan q shift x 3 and prn suprapubic pain or discomfort or no void >1hr while awake   - trend BMP   - Renal US     History of tobacco use   Vascular dementia  History of stroke (2018), TIAs several   History of MI/ CAD w/ stent (2004)  Exam negative for focal neurologic deficit, or cardiac abnormality. EKG in ED nsr VR 83, no concerning ST-T wave change.    - continue PTA nifedipine 60mg daily, hold for sbp <105   - continue PTA coreg 3.125mg BID, hold for sbp <100 or HR <55   - continue PTA aspirin 81mg daily   - on discharge will need referral to neurology and cardiology to establish care     Hypothyroidism  TSH 0.65 on admission   - continue PTA levothyroxine 88mcg daily     Seizure disorder   No history of tonic clonic seizures, [place]state neurologist and no established neurologist in [place]. CT brain in ED without acute changes and showing chronic changes c/w vascular disease. Unclear to what extent her AMS is 2/2 seizures vs underlying dementia and appears to have recurrent epsidose including within the last year.  Pt is on low dose of keppra.    - low threshold to involve neurology if any focal deficits or seizure activity   - refer to neurology on discharge    - continue PTA keppra 500mg BID   - continue PTA Lyrica 1000mg BID   - question of consideration for long term care facility      Degenerative arthritis  History of  bilateral hip replacement  She was previously on percocet 10-325mg and has continued to take 1/2 tab prn pain.    -  PTA on percocet 1/2 tab, 10-325mg daily, will hold given AMS   - APAP prn pain      HTN  Bp are mildly elevated 140-150s/70s.    - continue PTA hydrochlorothiazide 25mg daily, hold for sbp <120   - continue PTA nifedipine 60mg daily, hold for sbp <105   - continue PTA coreg 3.125mg BID, hold for sbp <100 or HR <55     HLD   - hold lipitor at this time given DKA, restart when glucose control improved and no further symptoms     Vit D deficiency  B12 deficiency   - continue supplementation       Diet: Moderate Consistent CHO Diet    DVT Prophylaxis: Heparin SQ  Parker Catheter: in place, indication: Strict 1-2 Hour I&O  Code Status: Full Code           Disposition Plan   Expected discharge: 2 - 3 days, recommended to prior living arrangement once DKA treated.  Entered: Divya Blandon MD 09/28/2020, 8:17 AM       The patient's care was discussed with the Bedside Nurse, Patient and Patient's Family.    Divya Blandon MD  Hospitalist Service, [address]  Pager: [phone]  Please see sticky note for cross cover information  ______________________________________________________________________    Interval History   Admitted overnight with DKA. Seen at bedside with daughter. She has been recommended multiple times to be in long term care facility and family has elected to pursue being able to care for her at home. She is feeling improved now that sugars are improving. Denies difficulty breathing. Eating omelette.     Data reviewed today: I reviewed all medications, new labs and imaging results over the last 24 hours. I personally reviewed the chest x-ray image(s) showing no acute airspace disease..    Physical Exam   Vital Signs: Temp: 97.2  F (36.2  C) Temp src: Axillary BP: 124/65 Pulse: 69   Resp: 14 SpO2: 98 % O2 Device: None (Room air)    Weight: 168 lbs 13.96  oz    Gen: NAD, alert, pleasant, cooperative, non-toxic  HEENT: EOMI, no scleral icterus, tracking appropriately  Resp: CTAB, no crackles or wheezes, no increased WOB  Cardiac: RRR, no S3/S4, no M/R/G appreciated  GI: soft, non-tender, non-distended  Ext: WWP, no edema, spontaneous movement in all 4  Neuro: Alert, oriented to year and location but not month or president, intermittent lack of focuse, CN 2-12 grossly intact      Data   Labs and Imaging reviewed in Epic, pertinent discussed in A&P.

## 2020-09-28 NOTE — PROVIDER NOTIFICATION
"Paged Cross Cover Gold: Richelle    \"6B: Lisa Matthews [id number]   Morning K+ is 3.2, do you want to replace? No conditional order in place?  Kyra MARTEL [id number]\"    Pending orders  "

## 2020-09-28 NOTE — PLAN OF CARE
Neuro: Patient was very lethargic this morning through early afternoon. Patient now much more alert and is oriented by 2-4. Forgetful with underlying dementia.   Cardiac: SR. HR 60-80's. VSS. Afebrile.    Respiratory: Sating >95% on RA.  GI/: Adequate urine output via li. BM X1.   Diet/appetite: Tolerating CHO diet. Eating well w/ sliding scale carb coverage. NPO at midnight for abdominal US tomorrow.    Activity:  Assist of 1-2, up to chair and bathroom.   Pain: At acceptable level on current regimen. C/o neck pain, declines intervention.   Skin: No new deficits noted. Sacral mepi CDI.   LDA's: PIVx3 infusing insulin gtt on alg #1.     Plan: Abdominal US tomorrow. Continue with POC. Notify primary team with changes.

## 2020-09-28 NOTE — CONSULTS
"Diabetes/Hyperglycemia Management Consult    Chief Complaint glycemic management recommendations  Consult requested by: Deepali Mcgovern PA-C  History of Present Illness Isabell Matthews is a 60 year old female with uncontrolled insulin-dependent diabetes, hypothyroidism, CKD 3, hypertension, hyperlipidemia, history of DKA, vascular dementia, stroke, MD/CAD, seizure disorder, admitted 9/27/2020 with altered mental status and DKA. Started on pip/tazo and vanco for sepsis and workup for focal source underway.  Concern pt will need TCU at discharge, possibly to LTC versus return to care of daughter.  She has been treated with IV insulin and fluids.  Anion gap closed and bicarb normalized with 0500 labs and ketone down to 0.1 at 1130.  Has been receiving D51/2NS at 250 ml/h since 0200 with IV insulin needs 2-7 units/h.  Osmolality decreased though still elevated.  Allowed to eat.  Yamilet is hungry and wants to get out of bed.  She complains of tingling in her feet.  Majority of information obtained from medical records and patient's daughter, due to pt's dementia.    Per primary team, home insulin dosing may be discordant with medical records.  Per Dr. Castaneda, \" Since her stroke she has had some cognitive impairment and her daughter Malik helps with her personal cares and manages her medication.\"    Maria Guadalupe describes pattern of mostly elevated BG last couple months at home (250-300s) with intermittent week-long stretches of BGs more often in 150s.  Glucose began reading \"high\" on Saturday evening, but there was concern that new meter strips might be a problem (there was also a battery error message).  Troubleshooting with meter and new meter confirming \"high\" reading along with symptoms made it clear to Maria Guadalupe that BG was indeed high.  She also noted a \"fruity\" smell emanating from her mother Sunday morning.  Maria Guadalupe administered the usual dose glargine Saturday night.  She administered lispro 14 units when BG high at supper and gave " "12 units at HS with a snack, then another 14 units in the morning Sunday.    No major changes to routine at home lately.  They are mostly sendentary.  Maria Guadalupe notes depression exacerbated with recent losses related to Covid have negatively impacted her mother and her.  They eat a moderate carb diet.  Maria Guadalupe suspects the lispro dose for meals needs to be better matched to intake variability.  She feels comfortable counting carbohydrates in foods, but needs dosing instruction.      Recent Labs   Lab 09/28/20  1157 09/28/20  1057 09/28/20  1001 09/28/20  0902 09/28/20  0818 09/28/20  0726  09/28/20  0517  09/27/20  1609 09/27/20  1601   GLC  --   --   --   --   --   --   --  253*  --  663* 672*   * 155* 169* 143* 175* 213*   < > 250*   < >  --   --     < > = values in this interval not displayed.         Diabetes Type: 1 versus 2, both listed in her [facility] records, treated as type 1  No DEMETRIA ab or C-peptide in [facility] records  2011-\"She presented  overnight on April 3, 2011, due to increasing nausea, vomiting, and  was found to be in diabetic ketoacidosis. She was admitted to the  intensive care unit, put on an insulin drip, and was followed by Dr. Parry, endocrinology. After the resolution of her ketoacidosis, she  was started on an insulin regimen with Lantus and NovoLog and was told  that she has type 1 diabetes and will always require insulin  \"  Diabetes Duration: 22 years  Usual Diabetes Regimen:   BID to TID BG monitoring frequency  Moderate carb diet, usually two main meals per day  Limited physical activity  Lantus 18 units at HS  Humalog 4 units per meal  Humalog correction scale- additional 1 unit per 50 mg/dL glucose for BG>150, up to 14 units if meter reads \"high\"    Ability to [place] Prescribed Regimen: impaired, dementia.  Daughter Maria Guadalupe is caregiver for last 8 months  Diabetes Control:   Lab Results   Component Value Date    A1C 11.7 09/27/2020     Diabetes Complications: peripheral " neuropathy  History of DKA: yes  Able to Detect Hypoglycemia: used to be able, now impaired by cognitive decline  Usual Diabetes Care Provider: new PCP is Dr. Castaneda  Factors Impacting Glucose Control: acute illness, chronic hyperglycemia, cognitive impirment      Review of Systems  10 point ROS completed with pertinent positives and negatives noted in the HPI    Past medical, family and social histories are reviewed and updated.    Past Medical History  Past Medical History:   Diagnosis Date     Chronic pain      Essential hypertension      Ex-cigarette smoker     quit 2018 over 35 years     Hyperlipidemia      Hypothyroid      Insulin-requiring or dependent type II diabetes mellitus (H)      Seizures (H)      Stroke (H)      Vascular dementia (H)        Family History  No diabetes documented    Social History  Social History     Socioeconomic History     Marital status:      Spouse name: Not on file     Number of children: Not on file     Years of education: Not on file     Highest education level: Not on file   Occupational History     Not on file   Social Needs     Financial resource strain: Not on file     Food insecurity     Worry: Not on file     Inability: Not on file     Transportation needs     Medical: Not on file     Non-medical: Not on file   Tobacco Use     Smoking status: Former Smoker     Packs/day: 0.50     Years: 35.00     Pack years: 17.50     Types: Cigarettes     Last attempt to quit: 2018     Years since quittin.2     Smokeless tobacco: Never Used   Substance and Sexual Activity     Alcohol use: Not Currently     Drug use: Not on file     Sexual activity: Not Currently   Lifestyle     Physical activity     Days per week: Not on file     Minutes per session: Not on file     Stress: Not on file   Relationships     Social connections     Talks on phone: Not on file     Gets together: Not on file     Attends [employer] service: Not on file     Active member of club or  organization: Not on file     Attends meetings of clubs or organizations: Not on file     Relationship status: Not on file     Intimate partner violence     Fear of current or ex partner: Not on file     Emotionally abused: Not on file     Physically abused: Not on file     Forced sexual activity: Not on file   Other Topics Concern     Not on file   Social History Narrative    Recently moved to [facility] with Daughter Charli who is her pca         Physical Exam  Temp: 98.2  F (36.8  C) Temp src: Oral BP: 134/75 Pulse: 83   Resp: 12 SpO2: 95 % O2 Device: None (Room air)    Body mass index is 31.92 kg/m .  Wt Readings from Last 4 Encounters:   09/27/20 76.6 kg (168 lb 14 oz)       General:  Middle aged woman resting in bed, in no distress.   HEENT: NC/AT, PER and anicteric, non-injected, oral mucous membranes moist.   Lungs: unlabored respiration, no cough  ABD: rounded  Skin: dry, no obvious lesions  MSK:  fluid movement of all extremities  Lymp:  no LE edema   Mental status: arouses to voice, cannot recall if she ate, oriented to self, location, speech is clear  Psych:  calm, even mood, intermittently difficult to engage    Laboratory  Recent Labs   Lab Test 09/28/20  0517 09/27/20  1609 09/27/20  1601    132* 132*   POTASSIUM 3.2* 4.4 4.4   CHLORIDE 114*  --  98   CO2 20  --  16*   ANIONGAP 9  --  18*   * 663* 672*   BUN 44*  --  68*   CR 1.81*  --  2.55*   VERONICA 7.9*  --  9.6     CBC RESULTS:   Recent Labs   Lab Test 09/28/20  0517   WBC 8.1   RBC 3.65*   HGB 9.7*   HCT 31.4*   MCV 86   MCH 26.6   MCHC 30.9*   RDW 15.2*          Liver Function Studies -   Recent Labs   Lab Test 09/27/20  1601   PROTTOTAL 8.1   ALBUMIN 3.6   BILITOTAL 0.4   ALKPHOS 221*   AST 50*   ALT 24       Active Medications  Current Facility-Administered Medications   Medication     aspirin (ASA) chewable tablet 81 mg     carvedilol (COREG) tablet 3.125 mg     cyanocobalamin (VITAMIN B-12) tablet 1,000 mcg     dextrose 50 %  injection 25-50 mL     glucose gel 15-30 g    Or     dextrose 50 % injection 25-50 mL    Or     glucagon injection 1 mg     heparin ANTICOAGULANT injection 5,000 Units     hydrochlorothiazide (HYDRODIURIL) tablet 25 mg     insulin 1 unit/1 mL in NS (NovoLIN, HumuLIN Regular) drip -ED DKA algorithm     levETIRAcetam (KEPPRA) tablet 500 mg     levothyroxine (SYNTHROID/LEVOTHROID) tablet 88 mcg     NIFEdipine ER (ADALAT CC) 24 hr tablet 60 mg     ondansetron (ZOFRAN) injection 4 mg     piperacillin-tazobactam (ZOSYN) 3.375 g vial to attach to  mL bag     pregabalin (LYRICA) capsule 100 mg     vancomycin place james - receiving intermittent dosing     Vitamin D3 (CHOLECALCIFEROL) tablet 25 mcg     No current outpatient medications on file.       Current Diet  Orders Placed This Encounter      Moderate Consistent CHO Diet        Assessment   Isabell Matthews is a 60 year old female with uncontrolled insulin-dependent diabetes, hypothyroidism, CKD 3, hypertension, hyperlipidemia, history of DKA, vascular dementia, stroke, MD/CAD, seizure disorder, admitted 9/27/2020 with altered mental status and DKA. Started on pip/tazo and vanco for sepsis and workup for focal source underway. With baseline diabetes uncontrolled, estimation of subcutaneous insulin needs in setting of illness is challenging.      Plan  - stop Dextrose fluids, support pt eating/hydration  - aspart for carb intake 1 unit per 12 grams AC, HS and PRN snacks  - IV insulin to continue for basal dose-finding (review of rates at 2100= still no steady state, so reevaluate in AM)  - BG q1-h per IV insulin protocol  - hypoglycemia protocol  - nutrition education on carb counting offered to pt's daughter.  She appreciates printed resources, but does not identify need for formal education.  - daughter may want to bring home glucometer to hospital to compare values  - can consider DEMETRIA ab, c-peptide but unlikely to change treatment strategy at this point    We will  follow.    Juju To APRN [facility] [phone]    Diabetes Management Team job code: [id number]  I spent a total of 85 minutes bedside and on the inpatient unit managing the glycemic care of Isabell Matthews. Over 50% of my time on the unit was spent counseling the patient and daughter and/or coordinating care regarding acute glucose management, planning for discharge.  See note for details.

## 2020-09-28 NOTE — PROGRESS NOTES
Admission          9/27/2020  22:27   -----------------------------------------------------------  Reason for admission:  Primary team notified of pt arrival. Yes, Cross Cover Nights  Admitted from: ED  Via: stretcher  Accompanied by: N/A  Belongings: No belongings with pt  Admission Profile: imcomplete due to pt cognitive state  Teaching: orientation to unit and call light- call light within reach, call don't fall, use of console, when to call for the RN, and enforced importance of safety   Access: Right and Left PIV  Telemetry: Placed on pt  Wt.: complete, bed weight  Code Status verified on armband: yes (updated with new band)  2 RN Skin Assessment Completed By: Kyra Carlin RN Consuelo  Med Rec completed: no d/t patient cognitive state, need to consult with family  Bed surface reassessed with algorithm and charted: yes  New bed surface ordered: no    Pt status:  Stable on insulin drip DKA algorithm    Temp:  [97.2  F (36.2  C)-98.5  F (36.9  C)] 97.2  F (36.2  C)  Pulse:  [64-97] 69  Resp:  [11-17] 14  BP: (103-152)/(41-85) 124/65  SpO2:  [95 %-99 %] 98 %

## 2020-09-28 NOTE — PROVIDER NOTIFICATION
"-------------------CRITICAL LAB VALUE-------------------    Lab Value: Ketone Quantiative  Time of notification: 12:04 AM  MD notified:Richelle  Patient status: Stable on insulin drip  Temp:  [97.8  F (36.6  C)-98.5  F (36.9  C)] 98.2  F (36.8  C)  Pulse:  [77-97] 77  Resp:  [11-17] 12  BP: (121-152)/(62-72) 138/67  SpO2:  [95 %-99 %] 96 %   Amcom Page:  \"Lisa Mark 6238-2  Critical Lab Value: Ketone Quantitative 3.7   Also need Code Status   Kyra MARTEL [id number]\"    Orders received: Pending     "

## 2020-09-28 NOTE — H&P
[facility], [place]    History and Physical - Hospitalist Service, Gold night       Date of Admission:  9/27/2020    Assessment & Plan   Isabell Matthews is a 60 year old female with poorly controlled IDDM-type 2, history of DKA, vascular dementia, CKD stage 3, history of stroke, history of MI/ CAD, hypothyroidism, seizure disorder, degenerative arthritis, HTN, HLD admitted on 9/27/2020 with altered mental status and glucoses that could not be controlled at home, found to be in DKA.     Diabetic ketoacidosis with mild lactic acidosis  Poorly controlled IDDM (unclear if Type 1 or Type 2)  Pseudohyponatremia  Acidotic on admission with + ketones and VBG: pH 7.28, pCO2 38, bicarb 18. Lactate 2.2-->1.4. HbA1C on admission 11.7, glucose initially in 600s and started on insulin drip in ED. PTA she is on insulin glargine 18 units at bedtime, and insulin aspart 4 units with meals + sliding scale. Unclear precipitating factors, per daughter was getting greater than her usual doses of insulin with additional 14 units additional insulin at a time. Suspect may be due to defective insulin, versus underlying infection, versus poor insulin absorption, versus much less likely insulinoma.   - consult to endocrinology diabetes team  - consult to DM educator  - insulin drip protocol  - trend DKA labs: BMP, ketones, glucose   - repeat VBG  - moderate CHO diet  - LR @125cc/hr x 10hrs  - Vanco/Zosyn pending further infection data  - follow up blood cx x 2  -     Nausea and vomiting  Suspect this is secondary to DKA +/- underlying infection.  Not present on exam.   - prn zofran   - urine tox screen       EVON CKD stage 3-4   History of incomplete bladder emptying  Interpolar renal lesion  Cr 2.5 on admission and prior in 2018 Cr was 1.8. Per daughter Cr is normally 2-3 has been as high as 4-5.  UA c/w glucosuria (>1000mg/dl), ketonuria. Per daughter, pt with known history of bladder scans in the 500s and is not  typically managed with a li catheter. Exam negative for CVA tenderness. CT A/P in ED shows a 3.1x2.6cm interpolar left renal mass with mild left perinephritic stranding and li catheter present in bladder on exam.   - consider trial of void in a.m. and instead use purewick catheter  - when li out, bladder scan q shift x 3 and prn suprapubic pain or discomfort or no void >1hr while awake  - trend BMP  - will need follow up study of renal lesion with CT or US in 3-6 months    History of tobacco use   Vascular dementia  History of stroke (2018), TIAs several   History of MI/ CAD w/ stent (2004)  Exam negative for focal neurologic deficit, or cardiac abnormality. EKG in ED nsr VR 83, no concerning ST-T wave change.   - continue PTA nifedipine 60mg daily, hold for sbp <105  - continue PTA coreg 3.125mg BID, hold for sbp <100 or HR <55  - continue PTA aspirin 81mg daily  - on discharge will need referral to cardiology to establish care    Hypothyroidism  TSH 0.65 on admission  - continue PTA levothyroxine 88mcg daily    Seizure disorder   No history of tonic clonic seizures, followed with out-of-state neurologist and no established neurologist in [place]. CT brain in ED without acute changes and showing chronic changes c/w vascular disease. Unclear to what extent her AMS is 2/2 seizures vs underlying dementia and appears to have recurrent epsidose including within the last year.  Pt is on low dose of keppra.   - low threshold to involve neurology if any focal deficits or seizure activity  - refer to neurology on discharge   -continue PTA keppra 500mg BID  - continue PTA Lyrica 1000mg BID  - question of consideration for long term care facility     Degenerative arthritis  History of bilateral hip replacement  She was previously on percocet 10-325mg and has continued to take 1/2 tab prn pain.   - PTA on percocet 1/2 tab, 10-325mg daily, will hold given AMS  - APAP prn pain     HTN  bp are mildly elevated 140-150s/70s.    - continue PTA hydrochlorothiazide 25mg daily, hold for sbp <120  - continue PTA nifedipine 60mg daily, hold for sbp <105  - continue PTA coreg 3.125mg BID, hold for sbp <100 or HR <55    HLD  - hold lipitor at this time given DKA, restart when glucose control improved and no further symptoms    Vit D deficiency  B12 deficiency  - continue supplementation       Diet: moderate CHO  DVT Prophylaxis: Heparin SQ  Parker Catheter: in place, indication: Strict 1-2 Hour I&O  Code Status: Full         Disposition Plan   Expected discharge: 2 - 3 days, recommended to pending interval progress once adequate pain management/ tolerating PO medications, antibiotic plan established, mental status at baseline, safe disposition plan/ TCU bed available and SIRS/Sepsis treated.  Entered: Deepali Mcgovern PA-C 09/27/2020, 8:06 PM     The patient's care was discussed with the Attending Physician, Dr. Loco, Bedside Nurse, Patient and Patient's Family.    Deepali Mcgovern PA-C  [facility]  Pager: [phone]  Please see sticky note for cross cover information  ______________________________________________________________________    Chief Complaint   Elevated glucose, DKA     History is obtained from the patient    History of Present Illness   Isabell Matthews is a 60 year old female with poorly controlled IDDM-type 2, history of DKA, vascular dementia, CKD stage 3, history of stroke, history of MI/ CAD, hypothyroidism, seizure disorder, degenerative arthritis, HTN, HLD admitted on 9/27/2020 with altered mental status and glucoses that could not be controlled at home, found to be in DKA.     Patient offers limited answers to questions and does not endorse particular complaints other than requesting to eat.  She denies current nausea and thinks she may have vomited. She denies pain, including chest or abdominal pain.  Denies changes to urination or bowel habits. She does not endorse fevers or  "chills.      Her daughter \"Maria Guadalupe\" was here with her earlier in the ED, unfortunately I was only able to leave her a VM.    Discussed with her daughter, Felton, who says the patient moved in with her sister to the [facility] ~9 months ago and that Isabell was living with Felton for 3 years prior to that in [facility]. She notes that at baseline, Isabell knows her name and sometimes her birthdate and does not typically recall she is in [place] without prompting.  Felton notes that her mother had many TIAs including several after her CVA in 2018 and that these are somewhat different versus her seizure episodes. She says the seizures are \"small\" and that Isabell, \"blacks out and falls over\" and that these seizures last ~20-30 seconds and that afterward she has difficulty walking x several days. She was followin with a neurologist and other specialists in [place], but has not been able to establish with specialists in [place].  Felton notes that over time, Lisa's renal disease has been \"creeping to stage 4\" and as she recalls her baseline Cr is 2-3 and has been as high as 4-5.       Review of Systems    The 10 point Review of Systems is severely limited due to patient dementia and is negative other than as per HPI     Past Medical History    I have reviewed this patient's medical history and updated it with pertinent information if needed.   Past Medical History:   Diagnosis Date     Chronic pain      Essential hypertension      Ex-cigarette smoker     quit 7/2018 over 35 years     Hyperlipidemia      Hypothyroid      Insulin-requiring or dependent type II diabetes mellitus (H)      Seizures (H)      Stroke (H)      Vascular dementia (H)    MI 2004  CVA 2018  Multiple TIAs  Multiple episodes of DKA    Past Surgical History   I have reviewed this patient's surgical history and updated it with pertinent information if needed.  Past Surgical History:   Procedure Laterality Date     athroplasty hip Bilateral     2003, 2006       Social " History   I have reviewed this patient's social history and updated it with pertinent information if needed.  Social History     Tobacco Use     Smoking status: Former Smoker     Packs/day: 0.50     Years: 35.00     Pack years: 17.50     Types: Cigarettes     Last attempt to quit: 2018     Years since quittin.2     Smokeless tobacco: Never Used   Substance Use Topics     Alcohol use: Not Currently     Drug use: Not on file       Family History     No known inherited disease, history limited due to dementia    Prior to Admission Medications   Prior to Admission Medications   Prescriptions Last Dose Informant Patient Reported? Taking?   Melatonin 10 MG TABS tablet 2020 at Unknown time  Yes Yes   Sig: Take 10 mg by mouth At Bedtime   NIFEdipine ER (ADALAT CC) 60 MG 24 hr tablet 2020 at Unknown time  No Yes   Sig: Take 1 tablet (60 mg) by mouth daily   SYNTHROID 88 MCG tablet 2020 at Unknown time  No Yes   Sig: Take 1 tablet (88 mcg) by mouth daily   Vitamin D3 (CHOLECALCIFEROL) 25 mcg (1000 units) tablet 2020 at Unknown time  No Yes   Sig: Take 1 tablet (25 mcg) by mouth daily   aspirin (ASA) 81 MG chewable tablet 2020 at Unknown time  No Yes   Sig: Take 1 tablet (81 mg) by mouth daily   atorvastatin (LIPITOR) 40 MG tablet 2020 at Unknown time  No Yes   Sig: Take 1 tablet (40 mg) by mouth daily   carvedilol (COREG) 3.125 MG tablet 2020 at Unknown time  No Yes   Sig: Take 1 tablet (3.125 mg) by mouth 2 times daily (with meals)   cyanocobalamin (VITAMIN B-12) 1000 MCG tablet 2020 at Unknown time  No Yes   Sig: Take 1 tablet (1,000 mcg) by mouth daily   hydrochlorothiazide (HYDRODIURIL) 25 MG tablet 2020 at Unknown time  No Yes   Sig: Take 1 tablet (25 mg) by mouth daily   insulin glargine (LANTUS PEN) 100 UNIT/ML pen 2020 at Unknown time  No Yes   Sig: Inject 18 Units Subcutaneous At Bedtime   insulin lispro (HUMALOG KWIKPEN) 100 UNIT/ML (1 unit dial) KWIKPEN  2020 at Unknown time  No Yes   Si units with food plus sliding scale see below usual dose 24 to 48 daily   insulin pen needle (31G X 8 MM) 31G X 8 MM miscellaneous   No No   Sig: Use 4 pen needles daily or as directed.   levETIRAcetam (KEPPRA) 500 MG tablet 2020 at Unknown time  No Yes   Sig: Take 1 tablet (500 mg) by mouth 2 times daily   pregabalin (LYRICA) 100 MG capsule 2020 at Unknown time  No Yes   Sig: Take 1 capsule (100 mg) by mouth 2 times daily      Facility-Administered Medications: None     Allergies   Allergies   Allergen Reactions     Pollen Extract Headache       Physical Exam   Vital Signs: Temp: 98.5  F (36.9  C) Temp src: Oral BP: (!) 148/71 Pulse: 82   Resp: 11 SpO2: 96 % O2 Device: None (Room air)    Weight: 153 lbs 12.8 oz  GENERAL: Alert and oriented x 3. NAD. Able to roll to side without assist. Cooperative.   HEENT: Anicteric sclera. Mucous membranes moist. NC. AT. EOMI. PERRL  CV: RRR. S1, S2. No murmurs appreciated.   RESPIRATORY: Effort normal on RA. Lungs CTAB with no wheezing, rales, rhonchi.   GI: Abdomen soft and non distended with normoactive bowel sounds present in all quadrants. No tenderness. No lesions.   NEUROLOGICAL: No focal deficits. Moves all extremities.  CN 2-12 grossly intact.  EXTREMITIES: No peripheral edema. Intact bilateral pedal pulses.   SKIN: No jaundice. No rashes.  BACK: no CVA tenderness, no spinous tenderness      Data   Data reviewed today: I reviewed all medications, new labs and imaging results over the last 24 hours. I personally reviewed the EKG tracing showing nsr VR 83, no ST-T wave changes.    Recent Labs   Lab 20  1609 20  1601   WBC  --  12.9*   HGB 12.6 11.0*   MCV  --  87   PLT  --  235   INR  --  0.99   * 132*   POTASSIUM 4.4 4.4   CHLORIDE  --  98   CO2  --  16*   BUN  --  68*   CR  --  2.55*   ANIONGAP  --  18*   VERONICA  --  9.6   * 672*   ALBUMIN  --  3.6   PROTTOTAL  --  8.1   BILITOTAL  --  0.4    ALKPHOS  --  221*   ALT  --  24   AST  --  50*   LIPASE  --  40*

## 2020-09-29 ENCOUNTER — APPOINTMENT (OUTPATIENT)
Dept: ULTRASOUND IMAGING | Facility: CLINIC | Age: 60
DRG: 637 | End: 2020-09-29
Attending: STUDENT IN AN ORGANIZED HEALTH CARE EDUCATION/TRAINING PROGRAM
Payer: MEDICARE

## 2020-09-29 LAB
ALBUMIN SERPL-MCNC: 2.5 G/DL (ref 3.4–5)
ALP SERPL-CCNC: 130 U/L (ref 40–150)
ALT SERPL W P-5'-P-CCNC: 19 U/L (ref 0–50)
ANION GAP SERPL CALCULATED.3IONS-SCNC: 6 MMOL/L (ref 3–14)
ANION GAP SERPL CALCULATED.3IONS-SCNC: 7 MMOL/L (ref 3–14)
ANION GAP SERPL CALCULATED.3IONS-SCNC: 7 MMOL/L (ref 3–14)
ANION GAP SERPL CALCULATED.3IONS-SCNC: 8 MMOL/L (ref 3–14)
ANION GAP SERPL CALCULATED.3IONS-SCNC: 9 MMOL/L (ref 3–14)
AST SERPL W P-5'-P-CCNC: 26 U/L (ref 0–45)
BASOPHILS # BLD AUTO: 0.1 10E9/L (ref 0–0.2)
BASOPHILS NFR BLD AUTO: 0.8 %
BILIRUB SERPL-MCNC: 0.2 MG/DL (ref 0.2–1.3)
BUN SERPL-MCNC: 22 MG/DL (ref 7–30)
BUN SERPL-MCNC: 24 MG/DL (ref 7–30)
BUN SERPL-MCNC: 26 MG/DL (ref 7–30)
BUN SERPL-MCNC: 27 MG/DL (ref 7–30)
BUN SERPL-MCNC: 28 MG/DL (ref 7–30)
CALCIUM SERPL-MCNC: 8.1 MG/DL (ref 8.5–10.1)
CALCIUM SERPL-MCNC: 8.2 MG/DL (ref 8.5–10.1)
CALCIUM SERPL-MCNC: 8.3 MG/DL (ref 8.5–10.1)
CALCIUM SERPL-MCNC: 8.4 MG/DL (ref 8.5–10.1)
CALCIUM SERPL-MCNC: 8.4 MG/DL (ref 8.5–10.1)
CALCIUM SERPL-MCNC: 8.5 MG/DL (ref 8.5–10.1)
CALCIUM SERPL-MCNC: 8.7 MG/DL (ref 8.5–10.1)
CHLORIDE SERPL-SCNC: 112 MMOL/L (ref 94–109)
CHLORIDE SERPL-SCNC: 113 MMOL/L (ref 94–109)
CHLORIDE SERPL-SCNC: 114 MMOL/L (ref 94–109)
CHLORIDE SERPL-SCNC: 115 MMOL/L (ref 94–109)
CHLORIDE SERPL-SCNC: 116 MMOL/L (ref 94–109)
CO2 SERPL-SCNC: 20 MMOL/L (ref 20–32)
CO2 SERPL-SCNC: 21 MMOL/L (ref 20–32)
CO2 SERPL-SCNC: 21 MMOL/L (ref 20–32)
CO2 SERPL-SCNC: 22 MMOL/L (ref 20–32)
CREAT SERPL-MCNC: 1.8 MG/DL (ref 0.52–1.04)
CREAT SERPL-MCNC: 1.81 MG/DL (ref 0.52–1.04)
CREAT SERPL-MCNC: 1.85 MG/DL (ref 0.52–1.04)
CREAT SERPL-MCNC: 1.92 MG/DL (ref 0.52–1.04)
CREAT SERPL-MCNC: 1.92 MG/DL (ref 0.52–1.04)
DIFFERENTIAL METHOD BLD: ABNORMAL
EOSINOPHIL # BLD AUTO: 0.1 10E9/L (ref 0–0.7)
EOSINOPHIL NFR BLD AUTO: 1.8 %
ERYTHROCYTE [DISTWIDTH] IN BLOOD BY AUTOMATED COUNT: 15.8 % (ref 10–15)
GFR SERPL CREATININE-BSD FRML MDRD: 27 ML/MIN/{1.73_M2}
GFR SERPL CREATININE-BSD FRML MDRD: 27 ML/MIN/{1.73_M2}
GFR SERPL CREATININE-BSD FRML MDRD: 29 ML/MIN/{1.73_M2}
GFR SERPL CREATININE-BSD FRML MDRD: 29 ML/MIN/{1.73_M2}
GFR SERPL CREATININE-BSD FRML MDRD: 30 ML/MIN/{1.73_M2}
GLUCOSE BLDC GLUCOMTR-MCNC: 111 MG/DL (ref 70–99)
GLUCOSE BLDC GLUCOMTR-MCNC: 112 MG/DL (ref 70–99)
GLUCOSE BLDC GLUCOMTR-MCNC: 122 MG/DL (ref 70–99)
GLUCOSE BLDC GLUCOMTR-MCNC: 126 MG/DL (ref 70–99)
GLUCOSE BLDC GLUCOMTR-MCNC: 127 MG/DL (ref 70–99)
GLUCOSE BLDC GLUCOMTR-MCNC: 128 MG/DL (ref 70–99)
GLUCOSE BLDC GLUCOMTR-MCNC: 130 MG/DL (ref 70–99)
GLUCOSE BLDC GLUCOMTR-MCNC: 142 MG/DL (ref 70–99)
GLUCOSE BLDC GLUCOMTR-MCNC: 153 MG/DL (ref 70–99)
GLUCOSE BLDC GLUCOMTR-MCNC: 156 MG/DL (ref 70–99)
GLUCOSE BLDC GLUCOMTR-MCNC: 161 MG/DL (ref 70–99)
GLUCOSE BLDC GLUCOMTR-MCNC: 163 MG/DL (ref 70–99)
GLUCOSE BLDC GLUCOMTR-MCNC: 166 MG/DL (ref 70–99)
GLUCOSE BLDC GLUCOMTR-MCNC: 180 MG/DL (ref 70–99)
GLUCOSE BLDC GLUCOMTR-MCNC: 185 MG/DL (ref 70–99)
GLUCOSE BLDC GLUCOMTR-MCNC: 195 MG/DL (ref 70–99)
GLUCOSE BLDC GLUCOMTR-MCNC: 199 MG/DL (ref 70–99)
GLUCOSE BLDC GLUCOMTR-MCNC: 208 MG/DL (ref 70–99)
GLUCOSE BLDC GLUCOMTR-MCNC: 242 MG/DL (ref 70–99)
GLUCOSE BLDC GLUCOMTR-MCNC: 266 MG/DL (ref 70–99)
GLUCOSE BLDC GLUCOMTR-MCNC: 278 MG/DL (ref 70–99)
GLUCOSE BLDC GLUCOMTR-MCNC: 325 MG/DL (ref 70–99)
GLUCOSE BLDC GLUCOMTR-MCNC: 99 MG/DL (ref 70–99)
GLUCOSE SERPL-MCNC: 129 MG/DL (ref 70–99)
GLUCOSE SERPL-MCNC: 156 MG/DL (ref 70–99)
GLUCOSE SERPL-MCNC: 175 MG/DL (ref 70–99)
GLUCOSE SERPL-MCNC: 178 MG/DL (ref 70–99)
GLUCOSE SERPL-MCNC: 194 MG/DL (ref 70–99)
GLUCOSE SERPL-MCNC: 288 MG/DL (ref 70–99)
GLUCOSE SERPL-MCNC: 321 MG/DL (ref 70–99)
HCT VFR BLD AUTO: 31.9 % (ref 35–47)
HGB BLD-MCNC: 10.2 G/DL (ref 11.7–15.7)
IMM GRANULOCYTES # BLD: 0 10E9/L (ref 0–0.4)
IMM GRANULOCYTES NFR BLD: 0.5 %
KETONES BLD-SCNC: 0.1 MMOL/L (ref 0–0.6)
KETONES BLD-SCNC: 0.1 MMOL/L (ref 0–0.6)
KETONES BLD-SCNC: 0.2 MMOL/L (ref 0–0.6)
KETONES BLD-SCNC: 0.3 MMOL/L (ref 0–0.6)
KETONES BLD-SCNC: 0.4 MMOL/L (ref 0–0.6)
KETONES BLD-SCNC: 0.5 MMOL/L (ref 0–0.6)
KETONES BLD-SCNC: 0.8 MMOL/L (ref 0–0.6)
LYMPHOCYTES # BLD AUTO: 1.7 10E9/L (ref 0.8–5.3)
LYMPHOCYTES NFR BLD AUTO: 28.3 %
MCH RBC QN AUTO: 27.1 PG (ref 26.5–33)
MCHC RBC AUTO-ENTMCNC: 32 G/DL (ref 31.5–36.5)
MCV RBC AUTO: 85 FL (ref 78–100)
MONOCYTES # BLD AUTO: 0.4 10E9/L (ref 0–1.3)
MONOCYTES NFR BLD AUTO: 7 %
NEUTROPHILS # BLD AUTO: 3.7 10E9/L (ref 1.6–8.3)
NEUTROPHILS NFR BLD AUTO: 61.6 %
NRBC # BLD AUTO: 0 10*3/UL
NRBC BLD AUTO-RTO: 0 /100
PLATELET # BLD AUTO: 206 10E9/L (ref 150–450)
POTASSIUM SERPL-SCNC: 3.4 MMOL/L (ref 3.4–5.3)
POTASSIUM SERPL-SCNC: 3.5 MMOL/L (ref 3.4–5.3)
POTASSIUM SERPL-SCNC: 3.6 MMOL/L (ref 3.4–5.3)
POTASSIUM SERPL-SCNC: 3.6 MMOL/L (ref 3.4–5.3)
POTASSIUM SERPL-SCNC: 3.7 MMOL/L (ref 3.4–5.3)
PROT SERPL-MCNC: 5.9 G/DL (ref 6.8–8.8)
RBC # BLD AUTO: 3.76 10E12/L (ref 3.8–5.2)
SODIUM SERPL-SCNC: 141 MMOL/L (ref 133–144)
SODIUM SERPL-SCNC: 141 MMOL/L (ref 133–144)
SODIUM SERPL-SCNC: 142 MMOL/L (ref 133–144)
SODIUM SERPL-SCNC: 143 MMOL/L (ref 133–144)
SODIUM SERPL-SCNC: 143 MMOL/L (ref 133–144)
SODIUM SERPL-SCNC: 144 MMOL/L (ref 133–144)
SODIUM SERPL-SCNC: 144 MMOL/L (ref 133–144)
WBC # BLD AUTO: 6 10E9/L (ref 4–11)

## 2020-09-29 PROCEDURE — 36415 COLL VENOUS BLD VENIPUNCTURE: CPT | Performed by: PHYSICIAN ASSISTANT

## 2020-09-29 PROCEDURE — 25000125 ZZHC RX 250: Performed by: STUDENT IN AN ORGANIZED HEALTH CARE EDUCATION/TRAINING PROGRAM

## 2020-09-29 PROCEDURE — 82010 KETONE BODYS QUAN: CPT | Performed by: PHYSICIAN ASSISTANT

## 2020-09-29 PROCEDURE — 25000128 H RX IP 250 OP 636: Performed by: PHYSICIAN ASSISTANT

## 2020-09-29 PROCEDURE — 99207 ZZC CDG-MDM COMPONENT: MEETS MODERATE - UP CODED: CPT | Performed by: STUDENT IN AN ORGANIZED HEALTH CARE EDUCATION/TRAINING PROGRAM

## 2020-09-29 PROCEDURE — 80053 COMPREHEN METABOLIC PANEL: CPT | Performed by: STUDENT IN AN ORGANIZED HEALTH CARE EDUCATION/TRAINING PROGRAM

## 2020-09-29 PROCEDURE — 99233 SBSQ HOSP IP/OBS HIGH 50: CPT | Performed by: STUDENT IN AN ORGANIZED HEALTH CARE EDUCATION/TRAINING PROGRAM

## 2020-09-29 PROCEDURE — 12000004 ZZH R&B IMCU UMMC

## 2020-09-29 PROCEDURE — 25000125 ZZHC RX 250: Performed by: PHYSICIAN ASSISTANT

## 2020-09-29 PROCEDURE — 80048 BASIC METABOLIC PNL TOTAL CA: CPT | Performed by: PHYSICIAN ASSISTANT

## 2020-09-29 PROCEDURE — 25800030 ZZH RX IP 258 OP 636: Performed by: PHYSICIAN ASSISTANT

## 2020-09-29 PROCEDURE — 25000128 H RX IP 250 OP 636: Performed by: STUDENT IN AN ORGANIZED HEALTH CARE EDUCATION/TRAINING PROGRAM

## 2020-09-29 PROCEDURE — 40000556 ZZH STATISTIC PERIPHERAL IV START W US GUIDANCE

## 2020-09-29 PROCEDURE — 25800029 ZZH RX IP 258 OP 250: Performed by: PHYSICIAN ASSISTANT

## 2020-09-29 PROCEDURE — 76700 US EXAM ABDOM COMPLETE: CPT

## 2020-09-29 PROCEDURE — 25000132 ZZH RX MED GY IP 250 OP 250 PS 637: Performed by: STUDENT IN AN ORGANIZED HEALTH CARE EDUCATION/TRAINING PROGRAM

## 2020-09-29 PROCEDURE — 25000131 ZZH RX MED GY IP 250 OP 636 PS 637: Mod: GY | Performed by: PHYSICIAN ASSISTANT

## 2020-09-29 PROCEDURE — 36415 COLL VENOUS BLD VENIPUNCTURE: CPT | Performed by: STUDENT IN AN ORGANIZED HEALTH CARE EDUCATION/TRAINING PROGRAM

## 2020-09-29 PROCEDURE — 85025 COMPLETE CBC W/AUTO DIFF WBC: CPT | Performed by: STUDENT IN AN ORGANIZED HEALTH CARE EDUCATION/TRAINING PROGRAM

## 2020-09-29 PROCEDURE — 00000146 ZZHCL STATISTIC GLUCOSE BY METER IP

## 2020-09-29 PROCEDURE — 25000132 ZZH RX MED GY IP 250 OP 250 PS 637: Mod: GY | Performed by: PHYSICIAN ASSISTANT

## 2020-09-29 RX ORDER — POTASSIUM CHLORIDE 1.5 G/1.58G
20 POWDER, FOR SOLUTION ORAL ONCE
Status: DISCONTINUED | OUTPATIENT
Start: 2020-09-29 | End: 2020-09-29

## 2020-09-29 RX ORDER — NICOTINE POLACRILEX 4 MG
15-30 LOZENGE BUCCAL
Status: DISCONTINUED | OUTPATIENT
Start: 2020-09-29 | End: 2020-10-01 | Stop reason: HOSPADM

## 2020-09-29 RX ORDER — DEXTROSE MONOHYDRATE 25 G/50ML
25-50 INJECTION, SOLUTION INTRAVENOUS
Status: DISCONTINUED | OUTPATIENT
Start: 2020-09-29 | End: 2020-10-01 | Stop reason: HOSPADM

## 2020-09-29 RX ORDER — POTASSIUM CHLORIDE 1.5 G/1.58G
20 POWDER, FOR SOLUTION ORAL ONCE
Status: COMPLETED | OUTPATIENT
Start: 2020-09-29 | End: 2020-09-29

## 2020-09-29 RX ADMIN — INSULIN ASPART 25 UNITS: 100 INJECTION, SOLUTION INTRAVENOUS; SUBCUTANEOUS at 20:14

## 2020-09-29 RX ADMIN — Medication 25 MCG: at 10:27

## 2020-09-29 RX ADMIN — HEPARIN SODIUM 5000 UNITS: 5000 INJECTION, SOLUTION INTRAVENOUS; SUBCUTANEOUS at 10:26

## 2020-09-29 RX ADMIN — INSULIN GLARGINE 22 UNITS: 100 INJECTION, SOLUTION SUBCUTANEOUS at 20:08

## 2020-09-29 RX ADMIN — LEVOTHYROXINE SODIUM 88 MCG: 88 TABLET ORAL at 10:27

## 2020-09-29 RX ADMIN — POTASSIUM CHLORIDE 20 MEQ: 1.5 POWDER, FOR SOLUTION ORAL at 10:42

## 2020-09-29 RX ADMIN — NIFEDIPINE 60 MG: 30 TABLET, FILM COATED, EXTENDED RELEASE ORAL at 10:28

## 2020-09-29 RX ADMIN — LEVETIRACETAM 500 MG: 500 TABLET ORAL at 19:03

## 2020-09-29 RX ADMIN — ASPIRIN 81 MG CHEWABLE TABLET 81 MG: 81 TABLET CHEWABLE at 10:27

## 2020-09-29 RX ADMIN — Medication: at 21:47

## 2020-09-29 RX ADMIN — HEPARIN SODIUM 5000 UNITS: 5000 INJECTION, SOLUTION INTRAVENOUS; SUBCUTANEOUS at 20:09

## 2020-09-29 RX ADMIN — PREGABALIN 100 MG: 100 CAPSULE ORAL at 10:28

## 2020-09-29 RX ADMIN — HUMAN INSULIN 1.5 UNITS/HR: 100 INJECTION, SOLUTION SUBCUTANEOUS at 09:12

## 2020-09-29 RX ADMIN — HUMAN INSULIN 4 UNITS/HR: 100 INJECTION, SOLUTION SUBCUTANEOUS at 14:07

## 2020-09-29 RX ADMIN — HUMAN INSULIN 3 UNITS/HR: 100 INJECTION, SOLUTION SUBCUTANEOUS at 10:25

## 2020-09-29 RX ADMIN — PIPERACILLIN SODIUM AND TAZOBACTAM SODIUM 3.38 G: 3; .375 INJECTION, POWDER, LYOPHILIZED, FOR SOLUTION INTRAVENOUS at 10:28

## 2020-09-29 RX ADMIN — CARVEDILOL 3.12 MG: 3.12 TABLET, FILM COATED ORAL at 17:23

## 2020-09-29 RX ADMIN — CARVEDILOL 3.12 MG: 3.12 TABLET, FILM COATED ORAL at 10:28

## 2020-09-29 RX ADMIN — POTASSIUM CHLORIDE, DEXTROSE MONOHYDRATE AND SODIUM CHLORIDE: 224; 5; 450 INJECTION, SOLUTION INTRAVENOUS at 00:55

## 2020-09-29 RX ADMIN — INSULIN ASPART 1 UNITS: 100 INJECTION, SOLUTION INTRAVENOUS; SUBCUTANEOUS at 12:13

## 2020-09-29 RX ADMIN — PREGABALIN 100 MG: 100 CAPSULE ORAL at 19:03

## 2020-09-29 RX ADMIN — POTASSIUM CHLORIDE: 2 INJECTION, SOLUTION, CONCENTRATE INTRAVENOUS at 00:32

## 2020-09-29 RX ADMIN — HYDROCHLOROTHIAZIDE 25 MG: 25 TABLET ORAL at 10:26

## 2020-09-29 RX ADMIN — LEVETIRACETAM 500 MG: 500 TABLET ORAL at 10:28

## 2020-09-29 RX ADMIN — HUMAN INSULIN 3.5 UNITS/HR: 100 INJECTION, SOLUTION SUBCUTANEOUS at 12:13

## 2020-09-29 RX ADMIN — CYANOCOBALAMIN TAB 1000 MCG 1000 MCG: 1000 TAB at 10:28

## 2020-09-29 RX ADMIN — PIPERACILLIN SODIUM AND TAZOBACTAM SODIUM 3.38 G: 3; .375 INJECTION, POWDER, LYOPHILIZED, FOR SOLUTION INTRAVENOUS at 05:57

## 2020-09-29 ASSESSMENT — MIFFLIN-ST. JEOR: SCORE: 1284.12

## 2020-09-29 ASSESSMENT — ACTIVITIES OF DAILY LIVING (ADL)
ADLS_ACUITY_SCORE: 26

## 2020-09-29 NOTE — PROGRESS NOTES
IP Diabetes Management  Daily Note           Assessment and Plan:   HPI: Isabell Matthews is a 60 year old female with uncontrolled insulin-dependent diabetes, hypothyroidism, CKD 3, hypertension, hyperlipidemia, history of DKA, vascular dementia, stroke, MD/CAD, seizure disorder, admitted 9/27/2020 with altered mental status and DKA. Started on pip/tazo and vanco for sepsis and workup for focal source underway.  Concern pt will need TCU at discharge, possibly to LTC versus return to care of daughter.    Assessment:   1) Suspected Insulin dependent Type II Diabetes Mellitus, (hx DKA, no DEMETRIA Ab/C-peptide)    Plan:    -continue IV insulin - we will consider transition this evening.   -aspart 1:12g CHO coverage with meals and snacks/supplements   -BG monitoring q1-2 hours per IV insulin protocol.   -hypoglycemia protocol   -carb counting protocol    Outpatient follow up: TBD. Long range, it would be worthwhile to clarify her endogenous insulin production with C-peptide level, and assess for DEMETRIA Ab presence; will not  this hospitalization so will not proceed with these test at this time.     Plan discussed with patient.    Interval History and Assessment: interval glucose trend reviewed:   Interruption in IV insulin yesterday evening; BG quickly to 300's.     Overnight, with D5%/0.45%NSS at 10cc/hour running, IV insulin rates averaging 1.9 units per hour, remaining much higher than her reported PTA basal insulin dosing.    She is currently NPO for abdominal ultrasound.  BMP with AG 6 (WNL), Cr 1.85.  BhB now 0.1.    Went down to abd US; IV infiltrated; RN estimates ~2 hours that IV insulin was not running. BG >300 as a result. Has now been restarted. Isabell ate 15g CHO for lunch today; received 1 unit aspart for this (1:12g CHO ratio is currently ordered)     Primary team discontinuing DKA related orders, will switch to non-DKA protocol now.   Family is declining LTC/TCU placement; she will go home when  "medically stable, pending BG and abd US results; as soon as tomorrow.     Current nutritional intake and type: Orders Placed This Encounter      Moderate Consistent CHO Diet    D5W-containing solutions:   D5/0.45 NSS at 10cc/hour.     PTA Diabetes Regimen:   BID to TID BG monitoring frequency  Moderate carb diet, usually two main meals per day  Limited physical activity  Lantus 18 units at HS  Humalog 4 units per meal  Humalog correction scale- additional 1 unit per 50 mg/dL glucose for BG>150, up to 14 units if meter reads \"high\"    Discharge Planning: TBD, ? TCU vs long term care.            Diabetes History:   Type of Diabetes: Type 2 Diabetes Mellitus, DKA on admission   Lab Results   Component Value Date    A1C 11.7 09/27/2020              Review of Systems:     The Review of Systems is negative other than noted in the Interval History.           Medications:     Current Facility-Administered Medications   Medication     aspirin (ASA) chewable tablet 81 mg     carvedilol (COREG) tablet 3.125 mg     cyanocobalamin (VITAMIN B-12) tablet 1,000 mcg     dextrose 5% and 0.45% NaCl + KCl 30 mEq/L infusion     dextrose 50 % injection 25-50 mL     glucose gel 15-30 g    Or     dextrose 50 % injection 25-50 mL    Or     glucagon injection 1 mg     heparin ANTICOAGULANT injection 5,000 Units     hydrochlorothiazide (HYDRODIURIL) tablet 25 mg     insulin 1 unit/1mL in saline (NovoLIN, HumuLIN Regular) drip - DKA algorithm     insulin aspart (NovoLOG) injection (RAPID ACTING)     insulin aspart (NovoLOG) injection (RAPID ACTING)     levETIRAcetam (KEPPRA) tablet 500 mg     levothyroxine (SYNTHROID/LEVOTHROID) tablet 88 mcg     NaCl 0.45 % 1,000 mL with potassium chloride 30 mEq/L infusion     NIFEdipine ER (ADALAT CC) 24 hr tablet 60 mg     ondansetron (ZOFRAN) injection 4 mg     piperacillin-tazobactam (ZOSYN) 3.375 g vial to attach to  mL bag     pregabalin (LYRICA) capsule 100 mg     Vitamin D3 (CHOLECALCIFEROL) " "tablet 25 mcg            Physical Exam:    /68 (BP Location: Right arm)   Pulse 85   Temp 98  F (36.7  C) (Oral)   Resp 16   Ht 1.549 m (5' 0.98\")   Wt 78.7 kg (173 lb 8 oz)   SpO2 96%   BMI 32.80 kg/m    General: pleasant, in no distress. Sitting at edge of bed, talking to roommate.    HEENT: normocephalic, atraumatic. Oral mucous membranes moist.   Lungs: unlabored respiration, no cough  ABD: rounded, soft, no lipodystrophy noted  Skin: warm and dry, no obvious lesions  MSK:  moves all extremities  Lymp:  no LE edema   Mental status:  alert, oriented to self, place, basic situation.  Psych:  affect, calm and appropriate interaction            Data:     Recent Labs   Lab 09/29/20  1301 09/29/20  1212 09/29/20  1150 09/29/20  1113 09/29/20  1025 09/29/20  1024 09/29/20  0901 09/29/20  0808 09/29/20  0755  09/29/20  0537  09/29/20  0424  09/29/20  0133   GLC  --   --  321*  --   --  288*  --  178*  --   --  156*  --  175*  --  194*   * 278*  --  325* 266*  --  180*  --  161*   < >  --    < >  --    < >  --     < > = values in this interval not displayed.     Lab Results   Component Value Date    WBC 6.0 09/29/2020    WBC 8.1 09/28/2020    WBC 12.9 (H) 09/27/2020    HGB 10.2 (L) 09/29/2020    HGB 9.7 (L) 09/28/2020    HGB 12.6 09/27/2020    HCT 31.9 (L) 09/29/2020    HCT 31.4 (L) 09/28/2020    HCT 35.2 09/27/2020    MCV 85 09/29/2020    MCV 86 09/28/2020    MCV 87 09/27/2020     09/29/2020     09/28/2020     09/27/2020     Lab Results   Component Value Date     09/29/2020     09/29/2020     09/29/2020    POTASSIUM 3.6 09/29/2020    POTASSIUM 3.4 09/29/2020    POTASSIUM 3.7 09/29/2020    CHLORIDE 116 (H) 09/29/2020    CHLORIDE 114 (H) 09/29/2020    CHLORIDE 116 (H) 09/29/2020    CO2 22 09/29/2020    CO2 20 09/29/2020    CO2 21 09/29/2020     (H) 09/29/2020     (H) 09/29/2020     (H) 09/29/2020     Lab Results   Component Value Date    BUN " 24 09/29/2020    BUN 26 09/29/2020    BUN 27 09/29/2020     Lab Results   Component Value Date    TSH 0.65 09/27/2020     Lab Results   Component Value Date    AST 26 09/29/2020    AST 50 (H) 09/27/2020    ALT 19 09/29/2020    ALT 24 09/27/2020    ALKPHOS 130 09/29/2020    ALKPHOS 221 (H) 09/27/2020     I spent a total of 35 minutes bedside and on the inpatient unit managing glycemic care. Over 50% of my time on the unit was spent counseling the patient and/or coordinating care regarding acute hyperglycemia management.  See note for details.    To contact Endocrine Diabetes service:   From 8AM-4PM: page inpatient diabetes provider that is following the patient  For questions or updates from 4PM-8AM: page the diabetes job code for on call fellow: [phone]    Shira Luna PA-C  Inpatient Diabetes Management Service  Pager [phone]

## 2020-09-29 NOTE — PROGRESS NOTES
[facility], [facility] Progress Note - Hospitalist Service, Gold 8       Date of Admission:  9/27/2020  Assessment & Plan             Isabell Matthews is a 60 year old female with poorly controlled IDDM-type 2, history of DKA, vascular dementia, CKD stage 3, history of stroke, history of MI/ CAD, hypothyroidism, seizure disorder, degenerative arthritis, HTN, HLD admitted on 9/27/2020 with altered mental status and glucoses that could not be controlled at home, found to be in DKA.      Diabetic ketoacidosis with mild lactic acidosis - improving  Poorly controlled IDDM (unclear if Type 1 or Type 2)  Pseudohyponatremia  Acidotic on admission with + ketones and VBG: pH 7.28, pCO2 38, bicarb 18. Lactate 2.2-->1.4. HbA1C on admission 11.7, glucose initially in 600s and started on insulin drip in ED. PTA she is on insulin glargine 18 units at bedtime, and insulin aspart 4 units with meals + sliding scale. Unclear precipitating factors, per daughter was getting greater than her usual doses of insulin with additional 14 units additional insulin at a time. Suspect may be due to defective insulin, versus underlying infection, versus poor insulin absorption, versus much less likely insulinoma. S/p vanc. Procal elevated however difficult to interpret in setting of CKD. No respiratory symptoms. UA negative for infection.    - Endocrinology DM team consulted, appreciate assistance   - DM Educator consulted, appreciate assistance   - Continue insulin gtt, transition off DKA protocol to regular insulin infusion protocol  - Stop D5 fluids  - Likely transition to basal subQ tonight per DM team   - Continue aspart TID AC    - moderate CHO diet  - Discontinue DKA labs from Q2H to daily    - Continue zosyn for now, if abdominal ultrasound negative and blood cultures remain negative, will likely discontinue tomorrow   - Urine culture finalized negative    - follow up blood cx x 2     Nausea and vomiting  - resolved  Suspect this is secondary to DKA +/- underlying infection.  Not present on exam. Urine tox screen negative   - prn zofran     EVON CKD stage 3-4   History of incomplete bladder emptying  Interpolar renal lesion  Cr 2.5 on admission and prior in 2018 Cr was 1.8. Per daughter Cr is normally 2-3 has been as high as 4-5.  UA c/w glucosuria (>1000mg/dl), ketonuria. Per daughter, pt with known history of bladder scans in the 500s and is not typically managed with a li catheter. Exam negative for CVA tenderness. CT A/P in ED shows a 3.1x2.6cm interpolar left renal mass with mild left perinephritic stranding and li catheter present in bladder on exam.    - Discontinue li, bladder scan q shift x 3 and prn suprapubic pain or discomfort or no void >1hr while awake   - Trend BMP   - Follow up results of abdominal ultrasound for further characterization of renal lesion      History of tobacco use   Vascular dementia  History of stroke (2018), TIAs several   History of MI/ CAD w/ stent (2004)  Exam negative for focal neurologic deficit, or cardiac abnormality. EKG in ED nsr VR 83, no concerning ST-T wave change.    - continue PTA nifedipine 60mg daily, hold for sbp <105   - continue PTA coreg 3.125mg BID, hold for sbp <100 or HR <55   - continue PTA aspirin 81mg daily   - on discharge will need referral to neurology and cardiology to establish care     Hypothyroidism  TSH 0.65 on admission   - continue PTA levothyroxine 88mcg daily     Seizure disorder   No history of tonic clonic seizures, [place] with [place]state neurologist and no established neurologist in [place]. CT brain in ED without acute changes and showing chronic changes c/w vascular disease. Unclear to what extent her AMS is 2/2 seizures vs underlying dementia and appears to have recurrent epsidose including within the last year.  Pt is on low dose of keppra.    - low threshold to involve neurology if any focal deficits or seizure activity   - refer  to neurology on discharge    - continue PTA keppra 500mg BID   - continue PTA Lyrica 1000mg BID     Degenerative arthritis  History of bilateral hip replacement  She was previously on percocet 10-325mg and has continued to take 1/2 tab prn pain.    -  PTA on percocet 1/2 tab, 10-325mg daily, currently on hold given AMS   - APAP prn pain      HTN  Bp are mildly elevated 140-150s/70s.    - continue PTA hydrochlorothiazide 25mg daily, hold for sbp <120   - continue PTA nifedipine 60mg daily, hold for sbp <105   - continue PTA coreg 3.125mg BID, hold for sbp <100 or HR <55     HLD   - hold lipitor at this time given DKA, restart when glucose control improved and no further symptoms     Vit D deficiency  B12 deficiency   - continue supplementation         Diet: Moderate Consistent CHO Diet    DVT Prophylaxis: Heparin SQ  Parker Catheter: in place, indication: Strict 1-2 Hour I&O  Code Status: Full Code           Disposition Plan   Expected discharge: 1-2 days, recommended to prior living arrangement once mental status at baseline and blood sugar control on subQ insulin.  TCU/LTC discussed with family previously, they have declined and would like to care for her at home.   Entered: Sosa Curry MD 09/29/2020, 7:24 AM       The patient's care was discussed with the Bedside Nurse and Patient.    Sosa Curry MD  Hospitalist Service, [address], [place]  Pager: [phone]   Please see sticky note for cross cover information  ______________________________________________________________________    Interval History   Isabell is upset this morning because she missed breakfast due to abdominal ultrasound. Otherwise she denies any complaints, including abdominal pain, nausea, vomiting. She is wondering when she can go home.     Data reviewed today: I reviewed all medications, new labs and imaging results over the last 24 hours. I personally reviewed no images or EKG's today.    Physical Exam    Vital Signs: Temp: 98  F (36.7  C) Temp src: Oral BP: 134/68 Pulse: 85   Resp: 16 SpO2: 96 % O2 Device: None (Room air)    Weight: 173 lbs 8.03 oz  General Appearance: Well appearing, in no distress.   Respiratory: Comfortable work of breathing on room air   Cardiovascular: RRR no murmurs   GI: Soft, non-distended, non-tender, +BS  Skin: Warm, no rashes   Other: Alert and oriented to conversation, no abnormal movements     Data   Recent Labs   Lab 09/29/20  1150 09/29/20  1024 09/29/20  0808  09/29/20  0424  09/28/20  0517 09/27/20  1609 09/27/20  1601   WBC  --   --   --   --  6.0  --  8.1  --  12.9*   HGB  --   --   --   --  10.2*  --  9.7* 12.6 11.0*   MCV  --   --   --   --  85  --  86  --  87   PLT  --   --   --   --  206  --  194  --  235   INR  --   --   --   --   --   --   --   --  0.99    141 143   < > 142   < > 142 132* 132*   POTASSIUM 3.7 3.7 3.5   < > 3.4   < > 3.2* 4.4 4.4   CHLORIDE 112* 113* 115*   < > 114*   < > 114*  --  98   CO2 20 20 21   < > 20   < > 20  --  16*   BUN 22 22 22   < > 26   < > 44*  --  68*   CR 1.80* 1.81* 1.80*   < > 1.80*   < > 1.81*  --  2.55*   ANIONGAP 8 8 7   < > 9   < > 9  --  18*   VERONICA 8.5 8.7 8.2*   < > 8.4*   < > 7.9*  --  9.6   * 288* 178*   < > 175*   < > 253* 663* 672*   ALBUMIN  --   --   --   --  2.5*  --   --   --  3.6   PROTTOTAL  --   --   --   --  5.9*  --   --   --  8.1   BILITOTAL  --   --   --   --  0.2  --   --   --  0.4   ALKPHOS  --   --   --   --  130  --   --   --  221*   ALT  --   --   --   --  19  --   --   --  24   AST  --   --   --   --  26  --   --   --  50*   LIPASE  --   --   --   --   --   --   --   --  40*    < > = values in this interval not displayed.

## 2020-09-29 NOTE — PROGRESS NOTES
Orders placed to transition off IV insulin tonight.   -Lantus 22 units at 2000; transition off IV insulin at 2200  -continue 1:12g CHO meal and snack coverage  -aspart moderate intensity sliding scale before meals, bedtime, and 0200 ordered, to start after transition off IV insulin.     Will follow up in AM    Shira Luna PA-C  Diabetes Management Service  Pager [phone]

## 2020-09-29 NOTE — PROGRESS NOTES
Brief Medicine Note    Cross cover discussed with DM provider on call. They discussed that they would continue insulin gtt per DKA protocol for now and if any changes they would do so overnight.     Cross cover was contacted by RN as patient's last K level was 3.2 s/o K-Dur 40mEq once and was wondering about K monitoring protocol.   Repeat BMP and beta-hydroxybutyrate level ordered. BMP with K 3.8 and beta-hydroxybutyrate of 1.6. Continue insulin gtt per DKA protocol and I added ordered for K replacement per protocol. Ordered BMP and beta-hydroxybutyrate levels 2 hrs per protocol.     Please page medicine with any additional questions or concerns.     Keisha Juarez PA-C  Hospitalist Service  Pager [phone]

## 2020-09-29 NOTE — PROGRESS NOTES
"Antimicrobial Stewardship Team Note    Antimicrobial Stewardship Program - A joint venture between [facility] and  Physicians to optimize antibiotic management.  NOT a formal consult - Restricted Antimicrobial Review     Patient: Isabell Matthews  MRN: [id number]  Allergies: Pollen extract    Brief Summary:  Isabell Matthews is a 60 year old female admitted on 09/27/2020 for altered mental status, nausea and vomiting. Her PMHx includes insulin dependent DM2 with DKA, CKD stage 3, stroke, MI/CAD, hypothyroidism, seizure disorder, HTN, HLD, and dementia.     HPI: Patient's daughter noted that patient has had \"high\" blood glucose readings including a error message on her glucometer. She also states that she has noticed that her mother is emitting a fruity odor. Patient experience episodes of incontinence due to dementia. BG ranges from 250-300 at home. Daughter administered 14 units of insulin lispro 3 hours before presentation.    Upon presentation to the ED, patient was afebrile (T= 97.7 F) with all VS wnl. Labs were notable for blood glucose > 600, pH= 7.28, and ketone quantitative= 3.7, anion gap= 18, and bicarbonate=18. Glucose in the urine was greater than 1000 mg/dL and Hgb A1c 11.7.  She also was hyponatremic (Tl=109) with elevated lactate at 2.2 mmol/L, improved to 1.4 with IVFs and IV insulin. Given slight leucocytosis (WBC 12.9 with ANC 11.9) patient was initiated on empiric Zosyn and vancomycin for possible infection contributing to DKA. Vancomycin was discontinued on 9/28 and patient continues on Zosyn.    CXR unremarkable except aortic atherosclerosis. CT abdomen/pelvis revealed a L kidney interpolar lesion measuring 3.1 cm, a possible R cyst, R renal stone, and possible L renal stone as well as diverticulosis w/o active diverticulitis. CT head with no  acute intracranial pathology but she does have mild to moderate diffuse cerebral atrophy and leukoaraiosis. UA negative (1 wbc, negative LE & " nitrites) and negative urine culture. Blood culture with NGTD x2 days. COVID-19 PCR negative.  Abdominal US was ordered today give initially slightly elevated LFTs which showed no gallstones or evidence of acute cholecystitis and no biliary dilatation         Active Anti-infective Medications   (From admission, onward)                 Start     Stop    09/28/20 1630  piperacillin-tazobactam  3.375 g,   Intravenous,   EVERY 6 HOURS     Intra-Abdominal Infection        --                  Assessment: Sepsis vs DKA   Patient presented with nauseas, vomiting, and hyperglycemia found to have DKA. Empiric Zosyn and vancomycin were initiated for sepsis due to concern infection precipitating DKA.    Isabell has been receiving IV fluids and insulin for DKA with improvement. Patient is on day 3 of empiric piperacillin-tazobactam therapy. There has been no growth in the blood cultures in two days and her urine culture is negative as well. Her leukocytosis has resolved. It seems highly unlikely that the cause of symptoms are related to infection. Imagings are all unremarkable for infectious etiology. DKA can cause hyponatremia, metabolic acidosis, leukocytosis, and anion gap. A precipitating event such as and infection may cause DKA, However, with a hemoglobin A1C= 11.2 and high blood glucose at home, it is more likely that Isabell's blood sugars are uncontrolled and she is experiencing DKA resulting from uncontrolled insulin dependent DM2. Recommend stopping piperacillin-tazobactam therapy at this time as there is no infectious etiology.    Recommendations:  1) Stop piperacillin-tazobactam     Pharmacy took the following actions: Called/paged provider, Electronic note created.    Discussed with ID Staff: Roseanne Walsh MD and Meghana Aguirre, PharmD, BCIDP    Aster Tabares, PharmD Candidate 2021  [facility] pager: [phone]        Vital Signs/Clinical Features:  Vitals         09/27 0700  -  09/28 0659 09/28 0700  -  09/29 0659  09/29 0700  -  09/29 1519   Most Recent    Temp ( F) 97.7 -  98.5    96.7 -  98.2    97.4 -  98     97.8 (36.6)    Pulse 70 -  97    64 -  88    84 -  94     94    Resp 11 -  17    11 -  16    16 -  18     18    /49 -  152/70    105/55 -  134/75    138/85 -  153/79     138/85    SpO2 (%) 95 -  99    95 -  100    95 -  96     96            Labs  Estimated Creatinine Clearance: 30.8 mL/min (A) (based on SCr of 1.8 mg/dL (H)).  Recent Labs   Lab Test 09/29/20  0133 09/29/20  0424 09/29/20  0537 09/29/20  0808 09/29/20  1024 09/29/20  1150   CR 1.92* 1.80* 1.85* 1.80* 1.81* 1.80*       Recent Labs   Lab Test 09/27/20  1601 09/27/20  1609 09/28/20  0517 09/29/20  0424   WBC 12.9*  --  8.1 6.0   ANEU 11.9*  --   --  3.7   ALYM 0.5*  --   --  1.7   COLE 0.3  --   --  0.4   AEOS 0.0  --   --  0.1   HGB 11.0* 12.6 9.7* 10.2*   HCT 35.2  --  31.4* 31.9*   MCV 87  --  86 85     --  194 206       Recent Labs   Lab Test 09/27/20  1601 09/29/20  0424   BILITOTAL 0.4 0.2   ALKPHOS 221* 130   ALBUMIN 3.6 2.5*   AST 50* 26   ALT 24 19       Recent Labs   Lab Test 09/27/20  1601 09/27/20  1929   PCAL 5.54*  --    LACT 2.2* 1.4             Culture Results:  7-Day Micro Results       Procedure Component Value Units Date/Time    Blood culture [[id number]] Collected:  09/27/20 2236    Order Status:  Completed Lab Status:  Preliminary result Updated:  09/29/20 0418    Specimen:  Blood      Specimen Description Blood Left Arm     Culture Micro No growth after 1 day    Urine Culture [[id number]] Collected:  09/27/20 1906    Order Status:  Canceled Lab Status:  No result     Specimen:  Urine catheter     Urine Culture Aerobic Bacterial [[id number]] Collected:  09/27/20 1906    Order Status:  Completed Lab Status:  Final result Updated:  09/28/20 2110    Specimen:  Catheterized Urine      Specimen Description Catheterized Urine     Special Requests Specimen received in preservative     Culture Micro No growth    Blood culture [[id number]]  Collected:  09/27/20 1648    Order Status:  Completed Lab Status:  Preliminary result Updated:  09/29/20 0418    Specimen:  Blood from Hand, Right      Specimen Description Blood Right Hand     Culture Micro No growth after 2 days    Blood culture [[id number]] Collected:  09/27/20 1617    Order Status:  Completed Lab Status:  Preliminary result Updated:  09/29/20 0418    Specimen:  Blood from Arm, Left      Specimen Description Blood Left Arm     Culture Micro No growth after 2 days            Recent Labs   Lab Test 09/27/20  1906   URINEPH 5.0   NITRITE Negative   LEUKEST Negative   WBCU 1                         Imaging: Us Abdomen Complete Portable    Result Date: 9/29/2020  EXAMINATION: US ABDOMEN COMPLETE,  9/29/2020 10:14 AM COMPARISON: CT abdomen and pelvis 9/27/2020 HISTORY: Removed cholecystitis in patient with elevated LFTs and DKA. Left renal density on CT. TECHNIQUE: The abdomen was scanned in standard fashion with specialized ultrasound transducer(s) using both gray-scale and limited color Doppler techniques. FINDINGS: Liver: The liver demonstrates normal homogeneous echotexture. Liver measures 12.7 cm in length. No evidence of a focal hepatic mass. Gallbladder:  There is no wall thickening, pericholecystic fluid, positive sonographic Garza's sign or evidence for cholelithiasis. Bile Ducts: Both the intra- and extrahepatic biliary system are of normal caliber.  The common bile duct measures 4 mm in diameter. Pancreas: Visualized portions of the head and body of the pancreas are unremarkable. Kidneys: Both kidneys are of normal echotexture, without hydronephrosis.   The craniocaudal dimensions are: right- 10.5 cm, left- 10.6 cm. There is some shadowing bowel gas with beam attenuation and shadowing bowel gas overlying the left abdomen which limits assessment of the lesion described on CT. There does appear to be an anechoic area suggesting at least a portion of this is cystic. Spleen: The spleen is normal in  size,  measuring 11.6 cm in sagittal dimension. Aorta and IVC: The visualized portions of the aorta and IVC are unremarkable. The proximal aorta measures 1.6 cm in diameter and the IVC measures 1 cm in diameter. Fluid: No evidence of ascites or pleural effusions.     IMPRESSION: 1.  Liver within normal limits. 2.  No gallstones or evidence of acute cholecystitis identified. No biliary dilatation. 3.  The lesion in the mid left kidney is not well assessed sonographically due to beam attenuation and shadowing bowel gas in this region but a portion of the mass is partially visualized and appears cystic. If the patient's renal function permits, could consider follow-up CT renal mass study. Correlation with any outside imaging would also be helpful. LEIDY ESPINOZA MD    Xr Chest Port 1 View    Result Date: 9/27/2020  Portable chest INDICATION: AMS, concern for infection, evaluate for pathology COMPARISON: None FINDINGS: Heart size and shape appear normal. There is aortic arch atherosclerosis. Lungs and pulmonary vascularity appear normal. Bony structures appear grossly intact.     IMPRESSION: Aortic atherosclerosis. ASIF LUNA MD    Ct Abdomen Pelvis W/o Contrast    Result Date: 9/27/2020  EXAMINATION: CT ABDOMEN PELVIS W/O CONTRAST, 9/27/2020 7:03 PM TECHNIQUE:  Helical CT images from the lung bases through the symphysis pubis were obtained without contrast.  Coronal reformatted images were generated at a workstation for further assessment. COMPARISON: None. HISTORY: DKA, generalized abdominal pain, kidney disease, leukocytosis, eval for pathology FINDINGS: Abdomen and pelvis: Liver: No suspicious liver lesions. Gallbladder: No gallstones. No evidence of acute cholecystitis. Spleen: Normal size. Pancreas: No suspicious pancreatic lesions. The pancreatic duct is not dilated. Adrenal glands: No adrenal nodules. Urinary system: 4 mm nonobstructing renal stone within the interpolar region of the right kidney. 2  mm nonobstructing stone within the interpolar region of the left kidney. Intermediate density lesion within the interpolar region of the left kidney measuring 3.1 x 2.6 cm (series 6, image 136). Probable right renal cyst measuring approximately 13 mm (series 6 image 152). Mild left perinephric stranding. No hydronephrosis, hydroureter, or obstructing renal stones. Possible right renal vascular phleboliths. Urinary catheter with balloon inflated within the bladder. Reproductive organs: Unremarkable. Bowel: Diverticulosis without evidence for diverticulitis. No abnormally dilated loops of large or small bowel. No abnormal bowel wall thickening. The appendix is unremarkable. Lymph nodes: No retroperitoneal, mesenteric, or pelvic lymphadenopathy. Fluid: No free fluid within the abdomen. Vessels: No infrarenal aortic aneurysm. Moderate scattered atherosclerotic calcifications of the abdominal aorta and iliac arteries. Celiac axis and SMA and splenic artery calcifications. Lung bases: No consolidation or pleural effusion. Bibasilar dependent atelectasis. Bones and soft tissues: No suspicious osseous lesions. Postoperative changes of bilateral total hip arthroplasty. Degenerative changes to the spine.     IMPRESSION: 1.  Intermediate density lesion within the interpolar region of the left kidney measuring up to 3.1 cm this could be better characterized with contrast enhanced CT however given the patient's renal function or can consider dedicated renal ultrasound or comparison with outside imaging if available. Probable right renal cyst. This may also be able to be better evaluated with ultrasound or contrast-enhanced cross-sectional imaging. Right renal stone and possible left renal stone. 2.  Diverticulosis without evidence for acute diverticulitis. I have personally reviewed the examination and initial interpretation and I agree with the findings. ASIF LUNA MD    Ct Head W/o Contrast    Result Date:  9/27/2020  CT HEAD W/O CONTRAST 9/27/2020 7:03 PM History: hx of stroke, dementia, fall 2 weeks ago, lethargy, eval for bleed Comparison: None available. Technique: Using multidetector thin collimation helical acquisition technique, axial, coronal and sagittal CT images from the skull base to the vertex were obtained without intravenous contrast. Findings: There is no intracranial hemorrhage, mass effect, or midline shift. Gray/white matter differentiation in both cerebral hemispheres is preserved. Ventricles are proportionate to the cerebral sulci. The basal cisterns are clear. Mild to moderate diffuse cerebral atrophy. Scattered periventricular and subcortical white matter hypoattenuation likely sequela of chronic small vessel ischemic disease foci of encephalomalacia within bilateral basal ganglia. The bony calvaria and the bones of the skull base are normal. Mucosal retention cyst within the right maxillary sinus. The remainder the paranasal sinuses are clear. The mastoid air cells are clear.Bilateral pseudophakia.     Impression: 1.  No acute intracranial pathology. 2.  Mild to moderate diffuse cerebral atrophy and leukoaraiosis. I have personally reviewed the examination and initial interpretation and I agree with the findings. ARMIDA VALENCIA MD

## 2020-09-29 NOTE — PROVIDER NOTIFICATION
"Paged [facility] Cover: Richelle    \"6B: Matthews Lisa [id number]  DKA insulin & K+ potassium protocol updated today, includes D5-1/2 NS KCL 30? however endocrine note states no D5 would you still like to proceed with infusion pt eating but NPO @ midnight  Kyra [id number]\"      See provider note from Ann 12:17AM  Continuing with DKA protocol and starting D5 1/2NS +KCL per protocol   "

## 2020-09-29 NOTE — PLAN OF CARE
"BP (!) 153/79 (BP Location: Right arm)   Pulse 94   Temp 97.4  F (36.3  C) (Oral)   Resp 18   Ht 1.549 m (5' 0.98\")   Wt 78.7 kg (173 lb 8 oz)   SpO2 96%   BMI 32.80 kg/m      Neuro: A&Ox3, forgetful and disorientated to time  Cardiac: HR 80-90's BP's 140-150s over 60-70s  Respiratory:  Maintaining adequate O2 sats on RA  GI/: DTV. Parker removed around 1300. No BM on shift.   Diet/appetite:  CHO diet. Had a late lunch, 75%consumes  Q1 blood sugars, currently on alg 2 insulin drip for DKA due to insulin being turned off while at US.  Activity:  Assist of 1 and walker  Pain: pt denies pain   Skin: no new deficits noted  LDA's:   R Lower PIV- Insulin Drip  R upper PIV - antibiotics    Abd ultrasound done, DKA orders discontinued.     Plan: Continue to monitor BG's and treat with insulin drip and carb coverage. Plan to transfer when successfully on subcutaneous insulin.   "

## 2020-09-29 NOTE — PROVIDER NOTIFICATION
"-------------------CRITICAL LAB VALUE-------------------    Lab Value: Ketone Quantitative 1.8  Time of notification: 10:29 PM  MD notified: ron, Keisha Juarez  Patient status: Stable on insulin Drip  Temp:  [96.7  F (35.9  C)-98.4  F (36.9  C)] 98.2  F (36.8  C)  Pulse:  [64-85] 83  Resp:  [11-14] 12  BP: (103-140)/(45-90) 134/75  SpO2:  [95 %-100 %] 95 %    Ascom Paged: \"6B: Lisa Matthews [id number]  Critical Lab- Ketone Quantitative: 1.8  Kyra MARTEL [id number]\"    Orders received: Pending    "

## 2020-09-29 NOTE — PROVIDER NOTIFICATION
-------------------CRITICAL LAB VALUE-------------------    Lab Value: Ketone Quantitative 1.6  Time of notification: 9:00 PM  MD notified: Yes  Patient status: Stable   Temp:  [96.7  F (35.9  C)-98.4  F (36.9  C)] 98.2  F (36.8  C)  Pulse:  [64-85] 83  Resp:  [11-14] 12  BP: (103-140)/(41-90) 134/75  SpO2:  [95 %-100 %] 95 %  Orders received:     Turn insulin pump back on (had turned off pending K+ results), instead will replace K+ if needed when labs result

## 2020-09-29 NOTE — PROVIDER NOTIFICATION
"Paged [facility] Cover:  \"6B: Isabell Matthews [id number]  FYI: Pt K+ is trending downward D5 1/2NS 30mEq KCL running at 10ml/hr per DKA protocol. 4am lab draw K+ was 3.4. Are you wanting K+ to be higher than this due to being on the insulin pump?  Kyra Yu\"      Oral K+ ordered, pt currently NPO for abdominal US this morning.     Follow up page sent:  \"6B: Cassie Isabell [id number]  In report from day shift pt was to have abdominal US this morning so has been NPO since midnight, K+ ordered is oral.   Is it okay to give K+ replacement? Do you want morning meds held?  Kyra [id number]\"    "

## 2020-09-29 NOTE — PROGRESS NOTES
"CLINICAL NUTRITION SERVICES - ASSESSMENT NOTE     Nutrition Prescription    RECOMMENDATIONS FOR MDs/PROVIDERS TO ORDER:  None     Malnutrition Status:    Patient does not meet two of the established criteria necessary for diagnosing malnutrition but is at risk for malnutrition    Recommendations already ordered by Registered Dietitian (RD):  None at this time     Future/Additional Recommendations:  Monitor oral intake and need for nutrition supplements   Monitor blood glucose, changes in insulin plan and need for additional education      REASON FOR ASSESSMENT  Isabell Matthews is a/an 60 year old female assessed by the dietitian for Admission Nutrition Risk Screen for new/uncontrolled diabetes    NUTRITION HISTORY  Patient not able to provide much information from patient due to dementia. Per endocrinology note, daughter feels comfortable counting Carbohydrates, helps mother with insulin.     CURRENT NUTRITION ORDERS  Diet: Moderate Consistent Carbohydrate  Intake/Tolerance: 100%     LABS  Glucose 127-180 (past 5)   HgbA1c 11.7 (H)    MEDICATIONS  Novolog  Insulin gtt     ANTHROPOMETRICS  Height: 154.9 cm (5' .984\")  Most Recent Weight: 78.7 kg (173 lb 8 oz)    IBW: 47.7 kg  BMI: Obesity Grade I BMI 30-34.9  Weight History:   Wt Readings from Last 15 Encounters:   09/29/20 78.7 kg (173 lb 8 oz)     Dosing Weight: 55 kg (adjusted)     ASSESSED NUTRITION NEEDS  Estimated Energy Needs: 9901-4054 kcals/day (25 - 30 kcals/kg)  Justification: Maintenance  Estimated Protein Needs: 66-82 grams protein/day (1.2 - 1.5 grams of pro/kg)  Justification: Increased needs  Estimated Fluid Needs: 3301-0333 mL/day (1 mL/kcal)   Justification: Maintenance    PHYSICAL FINDINGS  See malnutrition section below.    MALNUTRITION  % Intake: Unable to assess  % Weight Loss: None noted  Subcutaneous Fat Loss: None noted   Muscle Loss: Temporal:  mild, Upper arm (bicep, tricep):  mild, Dorsal hand: mild and Posterior calf:  Mild. Suspect muscle " loss may be due to being sedentary   Fluid Accumulation/Edema: None noted  Malnutrition Diagnosis: Patient does not meet two of the established criteria necessary for diagnosing malnutrition but is at risk for malnutrition    NUTRITION DIAGNOSIS  Impaired nutrient utilization  (glucose) related to pancreatic dysfunction as evidenced by type 2 diabetes with elevated hemoglobin A1c      INTERVENTIONS  Implementation  Nutrition Education: Not appropriate at this time due to patient condition     Goals  Patient to consume % of nutritionally adequate meal trays TID, or the equivalent with supplements/snacks.     Monitoring/Evaluation  Progress toward goals will be monitored and evaluated per protocol.    Iram José RD, LD  6B pager: [phone]

## 2020-09-29 NOTE — PLAN OF CARE
"/68 (BP Location: Right arm)   Pulse 85   Temp 98  F (36.7  C) (Oral)   Resp 16   Ht 1.549 m (5' 0.98\")   Wt 78.7 kg (173 lb 8 oz)   SpO2 96%   BMI 32.80 kg/m      Neuro: A&Ox3, forgetful and disorientated to time  Cardiac: HR 70-80's BP's 130's-120's over 70's  Respiratory:  Maintaining adequate O2 sats on RA  GI/: adequate output via li, plan to try to discontinue li today, no BM during shift  Diet/appetite:  NPO for abdominal US in morning, CHO diet  Q1 blood sugars, currently on alg 1 insulin drip for DKA  Activity:  Assist of 1 and walker  Pain: pt denies pain   Skin: no new deficits noted  LDA's:     Li Catheter  L PIV- insulin   R Lower PIV- D5 1/2NS plus KCL  R upper PIV - antibiotics    Plan: Try to discontinue li, abdominal CT to rule out any infection in abdomen, continue to monitor and control BG's. Nursing will continue to follow the POC and update the MD team with concerns.    Kyra Rodrigues RN  [facility]   "

## 2020-09-29 NOTE — PROVIDER NOTIFICATION
"Provider Notified: Paged Sabino Cabrales Cover Keisha Juarez    \"6B: MatthewsLisa [id number]  Morning K+ was 3.2, 40 mEq oral was ordered once no protocol entered and no recheck done, currently on DKA insulin algorithm, do you want a recheck/conditional protocol ordered?  Kyra MARTEL [id number]\"  "

## 2020-09-30 ENCOUNTER — TELEPHONE (OUTPATIENT)
Dept: ENDOCRINOLOGY | Facility: CLINIC | Age: 60
End: 2020-09-30

## 2020-09-30 LAB
ANION GAP SERPL CALCULATED.3IONS-SCNC: 8 MMOL/L (ref 3–14)
BUN SERPL-MCNC: 20 MG/DL (ref 7–30)
CALCIUM SERPL-MCNC: 9.2 MG/DL (ref 8.5–10.1)
CHLORIDE SERPL-SCNC: 109 MMOL/L (ref 94–109)
CO2 SERPL-SCNC: 24 MMOL/L (ref 20–32)
CREAT SERPL-MCNC: 1.56 MG/DL (ref 0.52–1.04)
GFR SERPL CREATININE-BSD FRML MDRD: 35 ML/MIN/{1.73_M2}
GLUCOSE BLDC GLUCOMTR-MCNC: 128 MG/DL (ref 70–99)
GLUCOSE BLDC GLUCOMTR-MCNC: 164 MG/DL (ref 70–99)
GLUCOSE BLDC GLUCOMTR-MCNC: 183 MG/DL (ref 70–99)
GLUCOSE BLDC GLUCOMTR-MCNC: 218 MG/DL (ref 70–99)
GLUCOSE BLDC GLUCOMTR-MCNC: 262 MG/DL (ref 70–99)
GLUCOSE BLDC GLUCOMTR-MCNC: 278 MG/DL (ref 70–99)
GLUCOSE BLDC GLUCOMTR-MCNC: 297 MG/DL (ref 70–99)
GLUCOSE SERPL-MCNC: 264 MG/DL (ref 70–99)
PLATELET # BLD AUTO: 212 10E9/L (ref 150–450)
POTASSIUM SERPL-SCNC: 3.8 MMOL/L (ref 3.4–5.3)
SODIUM SERPL-SCNC: 140 MMOL/L (ref 133–144)

## 2020-09-30 PROCEDURE — 80048 BASIC METABOLIC PNL TOTAL CA: CPT | Performed by: STUDENT IN AN ORGANIZED HEALTH CARE EDUCATION/TRAINING PROGRAM

## 2020-09-30 PROCEDURE — 85049 AUTOMATED PLATELET COUNT: CPT | Performed by: STUDENT IN AN ORGANIZED HEALTH CARE EDUCATION/TRAINING PROGRAM

## 2020-09-30 PROCEDURE — 25000132 ZZH RX MED GY IP 250 OP 250 PS 637: Performed by: PHYSICIAN ASSISTANT

## 2020-09-30 PROCEDURE — 36415 COLL VENOUS BLD VENIPUNCTURE: CPT | Performed by: STUDENT IN AN ORGANIZED HEALTH CARE EDUCATION/TRAINING PROGRAM

## 2020-09-30 PROCEDURE — 99207 ZZC CDG-MDM COMPONENT: MEETS MODERATE - UP CODED: CPT | Performed by: STUDENT IN AN ORGANIZED HEALTH CARE EDUCATION/TRAINING PROGRAM

## 2020-09-30 PROCEDURE — 99233 SBSQ HOSP IP/OBS HIGH 50: CPT | Performed by: STUDENT IN AN ORGANIZED HEALTH CARE EDUCATION/TRAINING PROGRAM

## 2020-09-30 PROCEDURE — 00000146 ZZHCL STATISTIC GLUCOSE BY METER IP

## 2020-09-30 PROCEDURE — 25000128 H RX IP 250 OP 636: Performed by: PHYSICIAN ASSISTANT

## 2020-09-30 PROCEDURE — 25000132 ZZH RX MED GY IP 250 OP 250 PS 637: Mod: GY | Performed by: STUDENT IN AN ORGANIZED HEALTH CARE EDUCATION/TRAINING PROGRAM

## 2020-09-30 PROCEDURE — 12000004 ZZH R&B IMCU UMMC

## 2020-09-30 RX ORDER — ACETAMINOPHEN 325 MG/1
650 TABLET ORAL EVERY 6 HOURS PRN
Status: DISCONTINUED | OUTPATIENT
Start: 2020-09-30 | End: 2020-10-01 | Stop reason: HOSPADM

## 2020-09-30 RX ADMIN — Medication 25 MCG: at 08:02

## 2020-09-30 RX ADMIN — CARVEDILOL 3.12 MG: 3.12 TABLET, FILM COATED ORAL at 18:47

## 2020-09-30 RX ADMIN — LEVETIRACETAM 500 MG: 500 TABLET ORAL at 08:02

## 2020-09-30 RX ADMIN — ASPIRIN 81 MG CHEWABLE TABLET 81 MG: 81 TABLET CHEWABLE at 08:02

## 2020-09-30 RX ADMIN — ACETAMINOPHEN 650 MG: 325 TABLET, FILM COATED ORAL at 14:56

## 2020-09-30 RX ADMIN — HEPARIN SODIUM 5000 UNITS: 5000 INJECTION, SOLUTION INTRAVENOUS; SUBCUTANEOUS at 08:02

## 2020-09-30 RX ADMIN — HEPARIN SODIUM 5000 UNITS: 5000 INJECTION, SOLUTION INTRAVENOUS; SUBCUTANEOUS at 20:36

## 2020-09-30 RX ADMIN — ACETAMINOPHEN 650 MG: 325 TABLET, FILM COATED ORAL at 20:30

## 2020-09-30 RX ADMIN — PREGABALIN 100 MG: 100 CAPSULE ORAL at 20:29

## 2020-09-30 RX ADMIN — CARVEDILOL 3.12 MG: 3.12 TABLET, FILM COATED ORAL at 08:02

## 2020-09-30 RX ADMIN — NIFEDIPINE 60 MG: 30 TABLET, FILM COATED, EXTENDED RELEASE ORAL at 08:02

## 2020-09-30 RX ADMIN — HYDROCHLOROTHIAZIDE 25 MG: 25 TABLET ORAL at 08:02

## 2020-09-30 RX ADMIN — LEVETIRACETAM 500 MG: 500 TABLET ORAL at 20:29

## 2020-09-30 RX ADMIN — CYANOCOBALAMIN TAB 1000 MCG 1000 MCG: 1000 TAB at 08:02

## 2020-09-30 RX ADMIN — PREGABALIN 100 MG: 100 CAPSULE ORAL at 08:01

## 2020-09-30 RX ADMIN — INSULIN ASPART 1 UNITS: 100 INJECTION, SOLUTION INTRAVENOUS; SUBCUTANEOUS at 11:41

## 2020-09-30 RX ADMIN — LEVOTHYROXINE SODIUM 88 MCG: 88 TABLET ORAL at 08:02

## 2020-09-30 ASSESSMENT — MIFFLIN-ST. JEOR: SCORE: 1282.3

## 2020-09-30 ASSESSMENT — PAIN DESCRIPTION - DESCRIPTORS
DESCRIPTORS: HEADACHE
DESCRIPTORS: ACHING

## 2020-09-30 ASSESSMENT — ACTIVITIES OF DAILY LIVING (ADL)
ADLS_ACUITY_SCORE: 26

## 2020-09-30 NOTE — PROGRESS NOTES
[facility], [facility] Progress Note - Hospitalist Service, Gold 8       Date of Admission:  9/27/2020  Assessment & Plan       Isabell Matthews is a 60 year old female with poorly controlled IDDM-type 2, history of DKA, vascular dementia, CKD stage 3, history of stroke, history of MI/ CAD, hypothyroidism, seizure disorder, degenerative arthritis, HTN, HLD admitted on 9/27/2020 with altered mental status and glucoses that could not be controlled at home, found to be in DKA.     Continues to be hyperglycemic after transition to subQ insulin, will continue to work with DM team to titrate insulin regimen to obtain good glycemic control prior to discharge. Hopeful for tomorrow.     DKA-resolved  Poorly controlled IDDM (A1c 11%)  Nausea/vomitng-resolved  Unclear etiology for DKA, infection initially a concern given elevated WBC and Procal, however pan cultures were negative and there was no apparent source for infections. More likely given evidence of uncontrolled disease (A1c 11%) DKA was due to inadequate home insulin regimen.  - Endocrinology DM team consulted, appreciate assistance  - Lantus 28 units at bedtime  - sliding scale insulin + carb correction with meals   - BGs still >250; Will continue to titrate subQ insulin to obtain better control   - Urine and blood cultures negative. Zosyn discontinued 9/29, has remained afebrile. Follow cultures and fever curve.  - Zofran PRN    EVON on likely CKD stage 3-4   History of incomplete bladder emptying  Interpolar renal lesion  Cr elevated to 2.55 on admission. Improving, today at 1.56. Unknown baseline (patient recently moved here), but suspect she had underlying CKD due to uncontrolled DM. CT A/P notable for L kidney interpolar lesion measuring 3.1 cm, with follow up ultrasound unable to clearly evaluate but appearing cystic in nature.    - Voiding adequately post li removal   - Radiology recommends follow up CT renal mass study  for further evaluation. This can be done outpatient when renal function returns to baseline.  - BMP daily      History of tobacco use   Vascular dementia  History of stroke (2018), TIAs several   History of MI/ CAD w/ stent (2004)   - continue PTA nifedipine 60mg daily, hold for sbp <105   - continue PTA coreg 3.125mg BID, hold for sbp <100 or HR <55   - continue PTA aspirin 81mg daily   - on discharge will need referral to cardiology to establish care     Hypothyroidism  TSH 0.65 on admission   - continue PTA levothyroxine 88mcg daily     Seizure disorder   No history of tonic clonic seizures, [place] with [place]state neurologist and no established neurologist in [place]. CT brain in ED without acute changes and showing chronic changes c/w vascular disease. Unclear to what extent her AMS is 2/2 seizures vs underlying dementia and appears to have recurrent epsidose including within the last year.  Pt is on low dose of keppra.    - low threshold to involve neurology if any focal deficits or seizure activity   - refer to neurology on discharge    - continue PTA keppra 500mg BID   - continue PTA Lyrica 1000mg BID     Degenerative arthritis  History of bilateral hip replacement  She was previously on percocet 10-325mg and has continued to take 1/2 tab prn pain.    -  PTA on percocet 1/2 tab, 10-325mg daily, currently on hold given AMS   - APAP prn pain      HTN   - continue PTA hydrochlorothiazide 25mg daily, hold for sbp <120   - continue PTA nifedipine 60mg daily, hold for sbp <105   - continue PTA coreg 3.125mg BID, hold for sbp <100 or HR <55     HLD   - hold lipitor at this time given DKA, restart when glucose control improved and no further symptoms     Vit D deficiency  B12 deficiency   - continue supplementation       Diet: Moderate Consistent CHO Diet    DVT Prophylaxis: Heparin SQ  Parker Catheter: not present  Code Status: Full Code           Disposition Plan   Expected discharge: Tomorrow, recommended to prior  living arrangement once BG controlled on subQ insulin.  Entered: Sosa Curry MD 09/30/2020, 6:58 AM       The patient's care was discussed with the Bedside Nurse, Patient and Patient's Family.    Sosa Curry MD  Hospitalist Service, [address], [place]  Pager: [phone]  Please see sticky note for cross cover information  ______________________________________________________________________    Interval History   Isabell is well today. 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 She denies any abdominal pain, nausea, or vomiting. She has had some loose stool today, which she thinks is due to the two cups of coffee she had this morning (ordinarily only has 1/2 cup). Otherwise no complaints or converns.     Data reviewed today: I reviewed all medications, new labs and imaging results over the last 24 hours. I personally reviewed no images or EKG's today.    Physical Exam   Vital Signs: Temp: 98  F (36.7  C) Temp src: Oral BP: (!) 158/72 Pulse: 110   Resp: 16 SpO2: 98 % O2 Device: None (Room air)    Weight: 173 lbs 1.6 oz  General Appearance: Well appearing, sitting up in chair, no distress  Respiratory: Breathing comfortably on room air, lungs clear to ausculation bilaterally   Cardiovascular: RRR, no murmurs   GI: Abdomen soft, non-distended, non-tender to palpation   Skin: Warm, no rash  Other: No lower extremity edema    Data   Recent Labs   Lab 09/30/20  0446 09/29/20  1150 09/29/20  1024  09/29/20  0424  09/28/20  0517 09/27/20  1609 09/27/20  1601   WBC  --   --   --   --  6.0  --  8.1  --  12.9*   HGB  --   --   --   --  10.2*  --  9.7* 12.6 11.0*   MCV  --   --   --   --  85  --  86  --  87     --   --   --  206  --  194  --  235   INR  --   --   --   --   --   --   --   --  0.99    141 141   < > 142   < > 142 132* 132*   POTASSIUM 3.8 3.7 3.7   < > 3.4   < > 3.2* 4.4 4.4   CHLORIDE  109 112* 113*   < > 114*   < > 114*  --  98   CO2 24 20 20   < > 20   < > 20  --  16*   BUN 20 22 22   < > 26   < > 44*  --  68*   CR 1.56* 1.80* 1.81*   < > 1.80*   < > 1.81*  --  2.55*   ANIONGAP 8 8 8   < > 9   < > 9  --  18*   VERONICA 9.2 8.5 8.7   < > 8.4*   < > 7.9*  --  9.6   * 321* 288*   < > 175*   < > 253* 663* 672*   ALBUMIN  --   --   --   --  2.5*  --   --   --  3.6   PROTTOTAL  --   --   --   --  5.9*  --   --   --  8.1   BILITOTAL  --   --   --   --  0.2  --   --   --  0.4   ALKPHOS  --   --   --   --  130  --   --   --  221*   ALT  --   --   --   --  19  --   --   --  24   AST  --   --   --   --  26  --   --   --  50*   LIPASE  --   --   --   --   --   --   --   --  40*    < > = values in this interval not displayed.

## 2020-09-30 NOTE — PLAN OF CARE
Major Shift Events: Alert, oriented to person/place at times, not situation. Slept on/off. Forgetful but redirectable. Bed alarm on for safety as pt attempted to get out of bed without calling RN multiple times. Afebrile, denies pain. VSS, on room air. Adequate UOP, small BM x2 in commode.  Plan: Continue to monitor, discuss possible discharge placement.  For vital signs and complete assessments, please see documentation flowsheets.

## 2020-09-30 NOTE — PROGRESS NOTES
IP Diabetes Management  Daily Note           Assessment and Plan:   HPI: Isabell Matthews is a 60 year old female with uncontrolled insulin-dependent diabetes, hypothyroidism, CKD 3, hypertension, hyperlipidemia, history of DKA, vascular dementia, stroke, MD/CAD, seizure disorder, admitted 9/27/2020 with altered mental status and DKA. Started on pip/tazo and vanco for sepsis and workup for focal source underway. Family declining TCU discharge.     Assessment:   1) Suspected Insulin dependent Type II Diabetes Mellitus, (hx DKA, no DEMETRIA Ab/C-peptide)  2) DKA on admission, no infectious source was identified.     For Today:   -additional Lantus 3 units given this morning.    -increase Lantus from 22 to 28 units qPM, beginning tonight   -increase Novolog from 1:12g to 1:10g CHO with meals, snacks   -increase Novolog from moderate to high intensity before meals and at bedtime    If Discharging home today: placed in discharge AVS and discussed with patients daughter over the phone.    -Lantus 28 units in the evening, to start 9/30   -Novolog 1 unit per 10 grams of carbohydrate; or 4 units for small meal, 6 units for large meal.   -Novolog high intensity sliding scale before meals and at bedtime.     Outpatient follow up: recommend establishing with endocrine sub specialist on discharge. Placed our [facility] endocrine number in discharge AVS; Isabell's daughter may call to schedule a post hospital follow up in 1-2 weeks.     Long range, it would be worthwhile to clarify her endogenous insulin production with C-peptide level, and assess for DEMETRIA Ab presence; will not  this hospitalization so will not proceed with these test at this time.     Plan discussed with patient, and daughter by phone.    Interval History and Assessment: interval glucose trend reviewed:   BG initially well controlled off IV insulin, however have been trending up since. No documentation of PO intake; she has a container of trail mix on her  "bedside table, but seems to be unopened. She is too confused this morning to comment on what she has eaten, if anything. Per RN, only had a couple bites for breakfast, and did not give any carb coverage insulin for this.     Pre-lunch BG was 278. She ate ~14g CHO with lunch (toast) and received 1 unit for this intake.   Asked RN to check a postprandial BG (2 hours after lunch): was 262.     Isabell seemed more disoriented today; asking for another gown because she was cold, about to walk out of her room over a recently mopped floor, and was asking for clothes because she was leaving the hospital.     Family is declining LTC/TCU placement; she will go home when medically stable. Discussed with primary team that safest plan may be another night in the hospital for continued insulin titration. They will discuss with daughter who will be here after a medical appointment this afternoon.     I discussed ongoing hyperglycemia with daughter today (earlier in the morning, before her noon BG resulted)-discussed possibility of discharge, as well as tentative insulin doses for discharge. Vishal endorsed that she felt able to do carb counting, but didn't feel like it \"works\" for her mom. She was concerned that the recommended mealtime insulin doses are less than what she was taking at home. Her Lantus dose has been increased rather significantly, and her sliding scale would be a higher intensity, hence the shift from bolus to her basal dosing. Discussed limitations of fixed meal insulin with variable PO intake, and the need to remain conservative with our recommendations since her mom has not yet eaten more than 15 g CHO with a single meal here (1-2 units of insulin need).     Current nutritional intake and type: Orders Placed This Encounter      Moderate Consistent CHO Diet    PTA Diabetes Regimen:   BID to TID BG monitoring frequency  Moderate carb diet, usually two main meals per day  Limited physical activity  Lantus 18 units " "at HS  Humalog 4 units per meal  Humalog correction scale- additional 1 unit per 50 mg/dL glucose for BG>150, up to 14 units if meter reads \"high\"    Discharge Plannin-2 more days.            Diabetes History:   Type of Diabetes: Type 2 Diabetes Mellitus, DKA on admission   Lab Results   Component Value Date    A1C 11.7 2020              Review of Systems:     The Review of Systems is negative other than noted in the Interval History.           Medications:     Current Facility-Administered Medications   Medication     aspirin (ASA) chewable tablet 81 mg     carvedilol (COREG) tablet 3.125 mg     cyanocobalamin (VITAMIN B-12) tablet 1,000 mcg     glucose gel 15-30 g    Or     dextrose 50 % injection 25-50 mL    Or     glucagon injection 1 mg     heparin ANTICOAGULANT injection 5,000 Units     hydrochlorothiazide (HYDRODIURIL) tablet 25 mg     insulin 1 unit/mL in saline (NovoLIN, HumuLIN Regular) drip - ADULT IV Infusion     insulin aspart (NovoLOG) injection (RAPID ACTING)     insulin aspart (NovoLOG) injection (RAPID ACTING)     insulin aspart (NovoLOG) injection (RAPID ACTING)     insulin aspart (NovoLOG) injection (RAPID ACTING)     insulin glargine (LANTUS PEN) injection 28 Units     levETIRAcetam (KEPPRA) tablet 500 mg     levothyroxine (SYNTHROID/LEVOTHROID) tablet 88 mcg     NaCl 0.45 % 1,000 mL with potassium chloride 30 mEq/L infusion     NIFEdipine ER (ADALAT CC) 24 hr tablet 60 mg     pregabalin (LYRICA) capsule 100 mg     Vitamin D3 (CHOLECALCIFEROL) tablet 25 mcg            Physical Exam:    BP (!) 152/91 (BP Location: Right arm)   Pulse 95   Temp 97.5  F (36.4  C) (Oral)   Resp 18   Ht 1.549 m (5' 0.98\")   Wt 78.5 kg (173 lb 1.6 oz)   SpO2 98%   BMI 32.72 kg/m    General: pleasant, in no distress. Wandering room   HEENT: normocephalic, atraumatic. Oral mucous membranes moist.   Lungs: unlabored respiration, no cough  ABD: rounded, soft, no lipodystrophy noted  Skin: warm and dry, no " obvious lesions  MSK:  moves all extremities  Lymp:  no LE edema   Mental status:  alert, more disoriented to her situation today.  Psych:  affect, calm and appropriate interaction            Data:     Recent Labs   Lab 09/30/20  1322 09/30/20  1117 09/30/20  0718 09/30/20  0446 09/30/20  0136 09/29/20  2146 09/29/20  2043  09/29/20  1150  09/29/20  1024  09/29/20  0808  09/29/20  0537  09/29/20  0424   GLC  --   --   --  264*  --   --   --   --  321*  --  288*  --  178*  --  156*  --  175*   * 278* 297*  --  218* 195* 163*   < >  --    < >  --    < >  --    < >  --    < >  --     < > = values in this interval not displayed.     Lab Results   Component Value Date    WBC 6.0 09/29/2020    WBC 8.1 09/28/2020    WBC 12.9 (H) 09/27/2020    HGB 10.2 (L) 09/29/2020    HGB 9.7 (L) 09/28/2020    HGB 12.6 09/27/2020    HCT 31.9 (L) 09/29/2020    HCT 31.4 (L) 09/28/2020    HCT 35.2 09/27/2020    MCV 85 09/29/2020    MCV 86 09/28/2020    MCV 87 09/27/2020     09/30/2020     09/29/2020     09/28/2020     Lab Results   Component Value Date     09/30/2020     09/29/2020     09/29/2020    POTASSIUM 3.8 09/30/2020    POTASSIUM 3.7 09/29/2020    POTASSIUM 3.7 09/29/2020    CHLORIDE 109 09/30/2020    CHLORIDE 112 (H) 09/29/2020    CHLORIDE 113 (H) 09/29/2020    CO2 24 09/30/2020    CO2 20 09/29/2020    CO2 20 09/29/2020     (H) 09/30/2020     (H) 09/29/2020     (H) 09/29/2020     Lab Results   Component Value Date    BUN 20 09/30/2020    BUN 22 09/29/2020    BUN 22 09/29/2020     Lab Results   Component Value Date    TSH 0.65 09/27/2020     Lab Results   Component Value Date    AST 26 09/29/2020    AST 50 (H) 09/27/2020    ALT 19 09/29/2020    ALT 24 09/27/2020    ALKPHOS 130 09/29/2020    ALKPHOS 221 (H) 09/27/2020     I spent a total of 35 minutes bedside and on the inpatient unit managing glycemic care. Over 50% of my time on the unit was spent counseling the  patient and/or coordinating care regarding acute hyperglycemia management.  See note for details.    To contact Endocrine Diabetes service:   From 8AM-4PM: page inpatient diabetes provider that is following the patient  For questions or updates from 4PM-8AM: page the diabetes job code for on call fellow: [phone]    Shira Luna PA-C  Inpatient Diabetes Management Service  Pager [phone]

## 2020-09-30 NOTE — DISCHARGE INSTRUCTIONS
"IP Diabetes Management Team Discharge Instructions    Glucose Control Regimen:   1) Lantus 26 units daily in the evening  2) Novolog with meals use the scales below to determine Novolog dose for meals. There is a \"base\" of 3 units per meal added to these scales to cover the carb in the meal. If you are not going to eat a meal, you should remove the base of 3 units.     Mealtime Scale, to be given based on blood sugar obtained prior to a meal  For Pre-Meal BG less than 140, 3 units  -169 give 4 units.  -199 give 5 units.  -229 give 6 units.  -259 give 7 units.  -289 give 8 units.  -319 give 9 units.  -349 give 10 units.  -379 give 11 units.  -409 give 12 units  BG greater than 410, give 13 units    Bedtime Scale, to be given based on pre-bedtime blood sugar  For Bedtime BG less than 200, no additional insulin needed  -229 give 1 units.  -259 give 2 units.  -289 give 3 units.  -319 give 4 units.  -349 give 5 units.  BG greater than 350 give 6 units.    Example: your blood sugar checked at lunch time is 245. If you are going to eat lunch, give 7 units. If you are not going to eat, minus the base of 3 units from this dose- so you would give 4 units.     Blood Glucose Checks: three times daily before meals, and at bedtime, as well as any time you feel that your blood sugar is too high or too low.    Endocrinology Outpatient follow up: we would love for you to schedule follow up with the [facility] endocrinology clinic 1-2 weeks from discharge! Please call the clinic at [phone] if you do not have an appointment scheduled on discharge, to schedule. We are currently doing telehealth visits.     If you have urgent questions or concerns regarding your blood sugars or insulin, you may contact [phone] (the main hospital ). Ask to speak with the endocrinologist on call. Please call this number if you have a blood sugar less than 60 " "that cannot be explained, or if you have consistent blood sugars over 250.     If you have associated symptoms: increasing thirst, urination, confusion, fruity smell to breath, or if your meter reads \"hi\"- please return to the ER to rule out recurrent diabetic ketoacidosis.     Thank you for letting the Diabetes Management Team be involved in your care!      "

## 2020-09-30 NOTE — PLAN OF CARE
"BP (!) 149/79 (BP Location: Right arm)   Pulse 93   Temp 98.1  F (36.7  C) (Oral)   Resp 16   Ht 1.549 m (5' 0.98\")   Wt 78.7 kg (173 lb 8 oz)   SpO2 93%   BMI 32.80 kg/m    A&O person, place, and occasional situation.  Forgetful.  DKA protocol discontinued. Insulin gtt to be stopped at 2200. SR.  HR 70-80s. Denies chest pain. Lungs diminished in bases, on room air.  Denies SOB.  Parker removed, voided X2.  Bladder scanned 189 ml.  Two watery stools on shift.  Abdominal ultrasound.  Denies pain.  Continue to monitor.    "

## 2020-09-30 NOTE — TELEPHONE ENCOUNTER
Please call patient's daughter Vishal to schedule a post hospital discharge diabetes follow up in 1-2 weeks. Any available provider.     Shira Luna PA-C  Diabetes Management Service

## 2020-10-01 ENCOUNTER — PATIENT OUTREACH (OUTPATIENT)
Dept: CARE COORDINATION | Facility: CLINIC | Age: 60
End: 2020-10-01

## 2020-10-01 VITALS
BODY MASS INDEX: 32.68 KG/M2 | SYSTOLIC BLOOD PRESSURE: 109 MMHG | HEART RATE: 86 BPM | TEMPERATURE: 97.8 F | HEIGHT: 61 IN | OXYGEN SATURATION: 98 % | WEIGHT: 173.1 LBS | RESPIRATION RATE: 18 BRPM | DIASTOLIC BLOOD PRESSURE: 69 MMHG

## 2020-10-01 LAB
ANION GAP SERPL CALCULATED.3IONS-SCNC: 6 MMOL/L (ref 3–14)
BUN SERPL-MCNC: 21 MG/DL (ref 7–30)
CALCIUM SERPL-MCNC: 9.5 MG/DL (ref 8.5–10.1)
CHLORIDE SERPL-SCNC: 108 MMOL/L (ref 94–109)
CO2 SERPL-SCNC: 28 MMOL/L (ref 20–32)
CREAT SERPL-MCNC: 1.58 MG/DL (ref 0.52–1.04)
GFR SERPL CREATININE-BSD FRML MDRD: 35 ML/MIN/{1.73_M2}
GLUCOSE BLDC GLUCOMTR-MCNC: 115 MG/DL (ref 70–99)
GLUCOSE BLDC GLUCOMTR-MCNC: 195 MG/DL (ref 70–99)
GLUCOSE BLDC GLUCOMTR-MCNC: 92 MG/DL (ref 70–99)
GLUCOSE SERPL-MCNC: 100 MG/DL (ref 70–99)
POTASSIUM SERPL-SCNC: 3.5 MMOL/L (ref 3.4–5.3)
SODIUM SERPL-SCNC: 142 MMOL/L (ref 133–144)

## 2020-10-01 PROCEDURE — 250N000013 HC RX MED GY IP 250 OP 250 PS 637: Performed by: STUDENT IN AN ORGANIZED HEALTH CARE EDUCATION/TRAINING PROGRAM

## 2020-10-01 PROCEDURE — 999N001017 HC STATISTIC GLUCOSE BY METER IP

## 2020-10-01 PROCEDURE — 99207 PR NO BILLABLE SERVICE THIS VISIT: CPT | Performed by: PHYSICIAN ASSISTANT

## 2020-10-01 PROCEDURE — 80048 BASIC METABOLIC PNL TOTAL CA: CPT | Performed by: STUDENT IN AN ORGANIZED HEALTH CARE EDUCATION/TRAINING PROGRAM

## 2020-10-01 PROCEDURE — 36415 COLL VENOUS BLD VENIPUNCTURE: CPT | Performed by: STUDENT IN AN ORGANIZED HEALTH CARE EDUCATION/TRAINING PROGRAM

## 2020-10-01 PROCEDURE — 99238 HOSP IP/OBS DSCHRG MGMT 30/<: CPT | Performed by: STUDENT IN AN ORGANIZED HEALTH CARE EDUCATION/TRAINING PROGRAM

## 2020-10-01 PROCEDURE — 250N000013 HC RX MED GY IP 250 OP 250 PS 637: Performed by: PHYSICIAN ASSISTANT

## 2020-10-01 PROCEDURE — 250N000011 HC RX IP 250 OP 636: Performed by: PHYSICIAN ASSISTANT

## 2020-10-01 PROCEDURE — 96372 THER/PROPH/DIAG INJ SC/IM: CPT | Performed by: PHYSICIAN ASSISTANT

## 2020-10-01 RX ADMIN — NIFEDIPINE 60 MG: 30 TABLET, FILM COATED, EXTENDED RELEASE ORAL at 08:30

## 2020-10-01 RX ADMIN — HEPARIN SODIUM 5000 UNITS: 5000 INJECTION, SOLUTION INTRAVENOUS; SUBCUTANEOUS at 08:29

## 2020-10-01 RX ADMIN — ASPIRIN 81 MG CHEWABLE TABLET 81 MG: 81 TABLET CHEWABLE at 08:29

## 2020-10-01 RX ADMIN — CYANOCOBALAMIN TAB 1000 MCG 1000 MCG: 1000 TAB at 08:29

## 2020-10-01 RX ADMIN — LEVOTHYROXINE SODIUM 88 MCG: 88 TABLET ORAL at 08:29

## 2020-10-01 RX ADMIN — ACETAMINOPHEN 650 MG: 325 TABLET, FILM COATED ORAL at 05:05

## 2020-10-01 RX ADMIN — INSULIN ASPART 1 UNITS: 100 INJECTION, SOLUTION INTRAVENOUS; SUBCUTANEOUS at 08:59

## 2020-10-01 RX ADMIN — HYDROCHLOROTHIAZIDE 25 MG: 25 TABLET ORAL at 08:29

## 2020-10-01 RX ADMIN — CARVEDILOL 3.12 MG: 3.12 TABLET, FILM COATED ORAL at 08:30

## 2020-10-01 RX ADMIN — Medication 25 MCG: at 08:29

## 2020-10-01 RX ADMIN — PREGABALIN 100 MG: 100 CAPSULE ORAL at 08:36

## 2020-10-01 RX ADMIN — LEVETIRACETAM 500 MG: 500 TABLET ORAL at 08:29

## 2020-10-01 RX ADMIN — INSULIN ASPART 3 UNITS: 100 INJECTION, SOLUTION INTRAVENOUS; SUBCUTANEOUS at 13:04

## 2020-10-01 ASSESSMENT — ACTIVITIES OF DAILY LIVING (ADL)
ADLS_ACUITY_SCORE: 26

## 2020-10-01 NOTE — PROGRESS NOTES
IP Diabetes Management  Daily Note           Assessment and Plan:   HPI: Isabell Matthews is a 60 year old female with uncontrolled insulin-dependent diabetes, hypothyroidism, CKD 3, hypertension, hyperlipidemia, history of DKA, vascular dementia, stroke, MD/CAD, seizure disorder, admitted 9/27/2020 with altered mental status and DKA. Started on pip/tazo and vanco for sepsis and workup for focal source underway. Family declining TCU discharge.     Assessment:   1) Suspected Insulin dependent Type II Diabetes Mellitus, (hx DKA, no DEMETRIA Ab/C-peptide)  2) DKA on admission, no infectious source was identified.     BG improved overnight, now with tight fasting control. Relatively low carb intake, though she ate lunch and dinner yesterday.     She is ok to discharge from diabetes perspective today.    For Today:  -Lantus will be reduced from 28>26 (she also had additional ~1.5 units on board from morning dose yesterday, total of 29.5 units, so this is a 12% reduction for home).  -continue 1:10g CHO while hospitalized   -reduce to custom 1/30 sliding scale before meals and at bedtime.     Recommendation for Discharge: placed in discharge AVS    -Lantus 26 units in the evening, to start 10/1   -Novolog base of 3 units per meal (added to sliding scale for ease of administration) + custom 1/30 intensity sliding scale before meals and at bedtime:    Mealtime Scale, to be given based on blood sugar obtained prior to a meal  For Pre-Meal BG less than 140 give 3 units  -169 give 4 units.  -199 give 5 units.  -229 give 6 units.  -259 give 7 units.  -289 give 8 units.  -319 give 9 units.  -349 give 10 units.  -379 give 11 units.  -409 give 12 units  BG greater than 410, give 13 units    Bedtime Scale, to be given based on pre-bedtime blood sugar  For Bedtime BG less than 200, no additional insulin needed  -229 give 1 units.  -259 give 2 units.  -289 give 3 units.  BG  "290-319 give 4 units.  -349 give 5 units.  BG greater than 350 give 6 units.    Outpatient follow up: recommend establishing with endocrine sub specialist on discharge. Placed our [facility] endocrine number in discharge AVS; Isabell's daughter may call to schedule a post hospital follow up in 1-2 weeks.     Long range, it would be worthwhile to clarify her endogenous insulin production with C-peptide level, and assess for DEMETRIA Ab presence; will not  this hospitalization so will not proceed with these test at this time.     Plan discussed with primary team.     Interval History and Assessment: interval glucose trend reviewed:   BG stabilized to 180's following carb coverage given with dinner (2 units for 18g).   Lantus 28 in the evening (with ~1.5 units still left over from AM supplemental dose); BG overnight 164>128>92. For safety, will recommend slight reduction in dose for discharge. I asked RN to have her eat a small snack this morning if not eating breakfast.     Attempted to call daughter x2 to discuss insulin plan for home, no answer.     I was able to connect with her 1135; she clarified with me that she was using only a sliding scale at home, not carb counting and she would not like to try this as it \"didn't work for my mom before\". She did note that there was a built in amount of insulin to cover meals, in the previous sliding scales (4 units per meal); discussed that 3 units on discharge would be more appropriate, as her Lantus dose is now higher. A new scale was addended above and in discharge AVS that includes 3 units built in to cover meals. She knows to subtract three units from proposed sliding scale dose if Isabell is not going to eat a meal.     Current nutritional intake and type: Orders Placed This Encounter      Moderate Consistent CHO Diet    PTA Diabetes Regimen:   BID to TID BG monitoring frequency  Moderate carb diet, usually two main meals per day  Limited physical " "activity  Lantus 18 units at HS  Humalog 4 units per meal  Humalog correction scale- additional 1 unit per 50 mg/dL glucose for BG>150, up to 14 units if meter reads \"high\"    Discharge Planning: discharge  to home this AM.            Diabetes History:   Type of Diabetes: Type 2 Diabetes Mellitus, DKA on admission   Lab Results   Component Value Date    A1C 11.7 09/27/2020              Review of Systems:     The Review of Systems is negative other than noted in the Interval History.           Medications:     Current Facility-Administered Medications   Medication     acetaminophen (TYLENOL) tablet 650 mg     aspirin (ASA) chewable tablet 81 mg     carvedilol (COREG) tablet 3.125 mg     cyanocobalamin (VITAMIN B-12) tablet 1,000 mcg     glucose gel 15-30 g    Or     dextrose 50 % injection 25-50 mL    Or     glucagon injection 1 mg     heparin ANTICOAGULANT injection 5,000 Units     hydrochlorothiazide (HYDRODIURIL) tablet 25 mg     insulin aspart (NovoLOG) injection (RAPID ACTING)     insulin aspart (NovoLOG) injection (RAPID ACTING)     insulin aspart (NovoLOG) injection (RAPID ACTING)     insulin aspart (NovoLOG) injection (RAPID ACTING)     insulin glargine (LANTUS PEN) injection 26 Units     levETIRAcetam (KEPPRA) tablet 500 mg     levothyroxine (SYNTHROID/LEVOTHROID) tablet 88 mcg     NIFEdipine ER (ADALAT CC) 24 hr tablet 60 mg     pregabalin (LYRICA) capsule 100 mg     Vitamin D3 (CHOLECALCIFEROL) tablet 25 mcg            Physical Exam:    BP (!) 119/94 (BP Location: Right arm)   Pulse 83   Temp 97.5  F (36.4  C) (Oral)   Resp 16   Ht 1.549 m (5' 0.98\")   Wt 78.5 kg (173 lb 1.6 oz)   SpO2 96%   BMI 32.72 kg/m    Pt was not seen prior to discharge today.        Data:     Recent Labs   Lab 10/01/20  0808 10/01/20  0507 10/01/20  0457 09/30/20  2202 09/30/20  2025 09/30/20  1849 09/30/20  1322 09/30/20  0446 09/30/20  0446 09/29/20  1150 09/29/20  1150 09/29/20  1024 09/29/20  1024 09/29/20  0808 " 09/29/20  0808 09/29/20  0537 09/29/20  0537   GLC  --   --  100*  --   --   --   --   --  264*  --  321*  --  288*  --  178*  --  156*   * 92  --  128* 164* 183* 262*   < >  --    < >  --    < >  --    < >  --    < >  --     < > = values in this interval not displayed.     Lab Results   Component Value Date    WBC 6.0 09/29/2020    WBC 8.1 09/28/2020    WBC 12.9 (H) 09/27/2020    HGB 10.2 (L) 09/29/2020    HGB 9.7 (L) 09/28/2020    HGB 12.6 09/27/2020    HCT 31.9 (L) 09/29/2020    HCT 31.4 (L) 09/28/2020    HCT 35.2 09/27/2020    MCV 85 09/29/2020    MCV 86 09/28/2020    MCV 87 09/27/2020     09/30/2020     09/29/2020     09/28/2020     Lab Results   Component Value Date     10/01/2020     09/30/2020     09/29/2020    POTASSIUM 3.5 10/01/2020    POTASSIUM 3.8 09/30/2020    POTASSIUM 3.7 09/29/2020    CHLORIDE 108 10/01/2020    CHLORIDE 109 09/30/2020    CHLORIDE 112 (H) 09/29/2020    CO2 28 10/01/2020    CO2 24 09/30/2020    CO2 20 09/29/2020     (H) 10/01/2020     (H) 09/30/2020     (H) 09/29/2020     Lab Results   Component Value Date    BUN 21 10/01/2020    BUN 20 09/30/2020    BUN 22 09/29/2020     Lab Results   Component Value Date    TSH 0.65 09/27/2020     Lab Results   Component Value Date    AST 26 09/29/2020    AST 50 (H) 09/27/2020    ALT 19 09/29/2020    ALT 24 09/27/2020    ALKPHOS 130 09/29/2020    ALKPHOS 221 (H) 09/27/2020       Shira Luna PA-C  Inpatient Diabetes Management Service  Pager [phone]

## 2020-10-01 NOTE — PLAN OF CARE
DISCHARGE                         10/1/2020  1:48 PM  ----------------------------------------------------------------------------  Discharged to: Home  Via: private transportation  Accompanied by: Family  Discharge Instructions: diet, activity, medications, follow up appointments, when to call the MD, aftercare instructions.  Prescriptions: To be filled by discharge pharmacy; medication list reviewed & sent with pt  Follow Up Appointments: arranged; information given  Belongings: All sent with pt  IV: d/c'd  Telemetry: d/c'd  Pt exhibits understanding of above discharge instructions; all questions answered.    Discharge Paperwork: Signed, copied, and sent home with patient.     Daughter Vishal at bedside, discussed plan and she is agreeable with plan moving forward. Will continue current plan of care and update MDs with any changes.

## 2020-10-01 NOTE — PLAN OF CARE
End of Shift Summary. See flowsheets for vital signs and detailed assessment.    Changes this shift: Pt alert and confused. Oriented to self. Pleasant and cooperative with cares. C/o shoulder pain. Given PRN acetaminophen x2 this shift. Independently repositions. BG within normal range for pt.      Plan:  Continue current POC. Notify provider with any changes. Prepare and educate for discharge.

## 2020-10-01 NOTE — PLAN OF CARE
Neuro: A&Ox to self and intermittently to place. Impulsive, but easily redirectable.   Cardiac: Afebrile. SR 80-90s, up to 100-110s with activity. VSS.   Respiratory: Sating >92% on RA.  GI/: Adequate urine output. BM X1.  Diet/appetite: Tolerating consistent CHO diet. Fair appetite. BG elevated to >250 for majority of day despite sliding scale insulin and CHO coverage.   Activity:  SBA, slightly unsteady.   Pain: Given tylenol x1 for headache, resolved.  Skin: No new deficits noted.  LDA's: PIV SL.     Plan: Monitor BG overnight and then possible discharge home tomorrow. Continue with POC. Notify primary team with changes.

## 2020-10-02 ENCOUNTER — TELEPHONE (OUTPATIENT)
Dept: INTERNAL MEDICINE | Facility: CLINIC | Age: 60
End: 2020-10-02

## 2020-10-02 ENCOUNTER — TELEPHONE (OUTPATIENT)
Dept: FAMILY MEDICINE | Facility: CLINIC | Age: 60
End: 2020-10-02

## 2020-10-02 NOTE — PROGRESS NOTES
[facility]   Spoke with daughter Vishal to discuss plans for home care. Patient to be discharged home 10/1/20 and has agreed to have [facility] follow with services of RN. Patient care support center processing referral.  Daughter verbalized understanding that initial visit is scheduled for 10/3 or 10/4/20.  Provided 24 hour phone number for [facility] for any questions or concerns.    Jacquelyn Carter RN BSN  [facility] Care Liaison  [phone]

## 2020-10-02 NOTE — DISCHARGE SUMMARY
[facility]   Hospitalist Discharge Summary      Date of Admission:  9/27/2020  Date of Discharge:  10/1/2020  1:48 PM  Discharging Provider: Sosa Curry MD  Discharge Team: Hospitalist Service, [place]     Discharge Diagnoses   Diabetic Ketoacidosis  Uncontrolled IDDM   Acute Metabolic Encephalopathy on Chronic Vascular Dementia  EVON on CKD   History of stroke (2018), TIAs several   History of MI/ CAD w/ stent (2004)  Hypothyroidism   Seizure disorder    Follow-ups Needed After Discharge   Follow-up Appointments     Adult [facility] Follow-up and recommended labs and tests      Follow up with primary care provider, Physician No Ref-Primary, within 7   days for hospital follow- up.  No follow up labs or test are needed.      Appointments on [place] and/or [facility] (with [facility] or [facility]   provider or service). Call [phone] if you haven't heard regarding   these appointments within 7 days of discharge.         Follow up with Neurology to establish care for seizure disorder (referral placed on discharge)  Follow up with Cardiology to establish care for history of coronary artery disease (referral placed on discharge)    Consider CT renal mass study to further evaluate cystic interpolar lesion in the left kidney found on abdominal CT when kidney function returns to baseline.    Unresulted Labs Ordered in the Past 30 Days of this Admission     Date and Time Order Name Status Description    9/27/2020 2043 Blood culture Preliminary     9/27/2020 1504 Blood culture Preliminary     9/27/2020 1504 Blood culture Preliminary       These results will be followed up by PCP    Discharge Disposition   Discharged to home  Condition at discharge: Good      Hospital Course         Isabell Matthews is a 60 year old female with poorly controlled IDDM-type 2, history of DKA, vascular dementia, CKD stage 3, history of stroke, history of MI/ CAD, hypothyroidism, seizure disorder,  degenerative arthritis, HTN, HLD admitted on 9/27/2020 with altered mental status and glucoses that could not be controlled at home, found to be in DKA. She was treated with IV insulin and fluids with improvement in mental status and hyperglycemia. She was followed by the DM team and transitioned to a subQ insulin regimen with good control of blood sugars.        DKA-resolved  Poorly controlled IDDM (A1c 11%)  Nausea/vomitng-resolved  Acute Metabolic Encephalopathy on Chronic Vascular Dementia-resolved  Unclear etiology for DKA, infection initially a concern given elevated WBC and Procal, however pan cultures were negative and there was no apparent source for infections. More likely given evidence of uncontrolled disease (A1c 11%) DKA was due to inadequate/inconsistent home insulin regimen.  - Endocrinology DM team consulted  - Lantus 26 units at bedtime  - sliding scale insulin + carb correction with meals and at bedtime   - Urine and blood cultures negative. Zosyn discontinued after 48 hours, remained afebrile.     EVON on likely CKD stage 3-4   History of incomplete bladder emptying  Interpolar renal lesion  Cr elevated to 2.55 on admission and improved during admission. Remained stable at 1.56 on day of discharge. Unknown baseline (patient recently moved here), but suspect she had underlying CKD due to uncontrolled DM. CT A/P notable for L kidney interpolar lesion measuring 3.1 cm, with follow up ultrasound unable to clearly evaluate but appearing cystic in nature.    - Voiding adequately post li removal   - Radiology recommends follow up CT renal mass study for further evaluation. This can be done outpatient when renal function returns to baseline.     History of tobacco use   Vascular dementia  History of stroke (2018), TIAs several   History of MI/ CAD w/ stent (2004)   - continue PTA nifedipine 60mg daily, hold for sbp <105   - continue PTA coreg 3.125mg BID, hold for sbp <100 or HR <55   - continue PTA  aspirin 81mg daily   -Cardiology referral on discharge to establish care     Hypothyroidism  TSH 0.65 on admission   - continue PTA levothyroxine 88mcg daily     Seizure disorder   No history of tonic clonic seizures, followed with out[place]of-state neurologist and no established neurologist in [place]. CT brain in ED without acute changes and showing chronic changes c/w vascular disease.    -Referral to neurology on discharge  to establish care   - continue PTA keppra 500mg BID   - continue PTA Lyrica 1000mg BID     Degenerative arthritis  History of bilateral hip replacement  She was previously on percocet 10-325mg and has continued to take 1/2 tab prn pain.    - APAP prn pain      HTN   - continue PTA hydrochlorothiazide 25mg daily, hold for sbp <120   - continue PTA nifedipine 60mg daily, hold for sbp <105   - continue PTA coreg 3.125mg BID, hold for sbp <100 or HR <55     HLD   -Restart statin     Vit D deficiency  B12 deficiency   - continue supplementation      Consultations This Hospital Stay   VASCULAR ACCESS CARE ADULT IP CONSULT  PHARMACY TO DOSE VANCO  PHARMACY TO DOSE VANCO  ENDOCRINE DIABETES ADULT IP CONSULT  CNS DIABETES IP CONSULT  DIABETES EDUCATION IP CONSULT  IV TEAM IP CONSULT  VASCULAR ACCESS CARE ADULT IP CONSULT  IV TEAM IP CONSULT  VASCULAR ACCESS CARE ADULT IP CONSULT  PHARMACY IP CONSULT    Code Status   Prior    Time Spent on this Encounter   I, Sosa Curry MD, personally saw the patient today and spent less than or equal to 30 minutes discharging this patient.       Sosa Curry MD  [facility]   ______________________________________________________________________    Physical Exam   Vital Signs: Temp: 97.8  F (36.6  C) Temp src: Oral BP: 109/69 Pulse: 86   Resp: 18 SpO2: 98 % O2 Device: None (Room air)    Weight: 173 lbs 1.6 oz  General Appearance: Sitting up in bed eating breakfast.  Pleasant and in no distress.  Appears older than stated  age.  Respiratory: Comfortable work of breathing on room air.  Lungs are clear to auscultation bilaterally.  Cardiovascular: Regular rate and rhythm.  No murmurs rubs or gallops.  GI: Abdomen is soft, nondistended, nontender to palpation.  Skin: Warm, no rash  Neuro: Alert and oriented to conversation.  Responds to questions appropriately.  Occasionally becomes confused and replies nonsensically.  Face symmetric and speech intact.  Moving all extremities.  No abnormal movements.  Other: No lower extremity edema       Primary Care Physician   Bony Castaneda    Discharge Orders      Neurology Adult Referral      Cardiology Eval Adult Referral      Reason for your hospital stay    Dear Isabell,    You came to the hospital because you were very sick, and we found you to be in Diabetic KetoAcidosis (DKA). We treated you with IV fluids, insulin, and close laboratory monitoring. Once your blood sugars and labwork improved, we were able to transition you from the IV insulin to subcutaneous (under the skin) insulin. We worked closely with the diabetes team to make sure you were on a good insulin regimen to go home with. We would like you to continue the insulin regimen outlined in your prescriptions as well as the discharge instructions. You should follow up with your primary care doctor within 1 week. The Diabetes team also provided their clinic information in the discharge instructions if you would like to continue having your diabetes care managed with them. We have also placed referrals for you to establish care with Cardiology and Neurology.     It was a pleasure taking care of you during  Your hospital stay. Please stay well and take care!    Sosa Curry MD  Hospitalist, [facility]     Adult [email or web address] Follow-up and recommended labs and tests    Follow up with primary care provider, Physician No Ref-Primary, within 7 days for hospital follow- up.  No follow up labs or test are needed.      Appointments on  [place] and/or [facility] (with [facility] or [facility] provider or service). Call [phone] if you haven't heard regarding these appointments within 7 days of discharge.     Activity    Your activity upon discharge: activity as tolerated     Diet    Follow this diet upon discharge: Moderate consistent carbohydrate (6933-7589 apollo / 4-6 CHO units per meal)       Significant Results and Procedures   Most Recent 3 CBC's:  Recent Labs   Lab Test 09/30/20  0446 09/29/20  0424 09/28/20  0517 09/27/20  1609 09/27/20  1601   WBC  --  6.0 8.1  --  12.9*   HGB  --  10.2* 9.7* 12.6 11.0*   MCV  --  85 86  --  87    206 194  --  235     Most Recent 3 BMP's:  Recent Labs   Lab Test 10/01/20  0457 09/30/20  0446 09/29/20  1150    140 141   POTASSIUM 3.5 3.8 3.7   CHLORIDE 108 109 112*   CO2 28 24 20   BUN 21 20 22   CR 1.58* 1.56* 1.80*   ANIONGAP 6 8 8   APOLLO 9.5 9.2 8.5   * 264* 321*     Most Recent Hemoglobin A1c:  Recent Labs   Lab Test 09/27/20  1601   A1C 11.7*     Most Recent 6 glucoses:  Recent Labs   Lab Test 10/01/20  0457 09/30/20  0446 09/29/20  1150 09/29/20  1024 09/29/20  0808 09/29/20  0537   * 264* 321* 288* 178* 156*   ,   Results for orders placed or performed during the hospital encounter of 09/27/20   CT Head w/o Contrast    Narrative    CT HEAD W/O CONTRAST 9/27/2020 7:03 PM    History: hx of stroke, dementia, fall 2 weeks ago, lethargy, eval for  bleed     Comparison: None available.    Technique: Using multidetector thin collimation helical acquisition  technique, axial, coronal and sagittal CT images from the skull base  to the vertex were obtained without intravenous contrast.    Findings: There is no intracranial hemorrhage, mass effect, or midline  shift. Gray/white matter differentiation in both cerebral hemispheres  is preserved. Ventricles are proportionate to the cerebral sulci. The  basal cisterns are clear. Mild to moderate diffuse cerebral atrophy.  Scattered  periventricular and subcortical white matter hypoattenuation  likely sequela of chronic small vessel ischemic disease foci of  encephalomalacia within bilateral basal ganglia.    The bony calvaria and the bones of the skull base are normal. Mucosal  retention cyst within the right maxillary sinus. The remainder the  paranasal sinuses are clear. The mastoid air cells are clear.Bilateral  pseudophakia.      Impression    Impression:  1.  No acute intracranial pathology.   2.  Mild to moderate diffuse cerebral atrophy and leukoaraiosis.    I have personally reviewed the examination and initial interpretation  and I agree with the findings.    ARMIDA VALENCIA MD   XR Chest Port 1 View    Narrative    Portable chest    INDICATION: AMS, concern for infection, evaluate for pathology    COMPARISON: None    FINDINGS: Heart size and shape appear normal. There is aortic arch  atherosclerosis. Lungs and pulmonary vascularity appear normal. Bony  structures appear grossly intact.      Impression    IMPRESSION: Aortic atherosclerosis.    ASIF LUNA MD   CT Abdomen Pelvis w/o Contrast    Narrative    EXAMINATION: CT ABDOMEN PELVIS W/O CONTRAST, 9/27/2020 7:03 PM    TECHNIQUE:  Helical CT images from the lung bases through the  symphysis pubis were obtained without contrast.  Coronal reformatted  images were generated at a workstation for further assessment.    COMPARISON: None.    HISTORY: DKA, generalized abdominal pain, kidney disease,  leukocytosis, eval for pathology    FINDINGS:    Abdomen and pelvis:   Liver: No suspicious liver lesions.   Gallbladder: No gallstones. No evidence of acute cholecystitis.  Spleen: Normal size.  Pancreas: No suspicious pancreatic lesions. The pancreatic duct is not  dilated.  Adrenal glands: No adrenal nodules.  Urinary system: 4 mm nonobstructing renal stone within the interpolar  region of the right kidney. 2 mm nonobstructing stone within the  interpolar region of the left kidney.  Intermediate density lesion  within the interpolar region of the left kidney measuring 3.1 x 2.6 cm  (series 6, image 136). Probable right renal cyst measuring  approximately 13 mm (series 6 image 152). Mild left perinephric  stranding. No hydronephrosis, hydroureter, or obstructing renal  stones. Possible right renal vascular phleboliths. Urinary catheter  with balloon inflated within the bladder.  Reproductive organs: Unremarkable.  Bowel: Diverticulosis without evidence for diverticulitis. No  abnormally dilated loops of large or small bowel. No abnormal bowel  wall thickening. The appendix is unremarkable.  Lymph nodes: No retroperitoneal, mesenteric, or pelvic  lymphadenopathy.  Fluid: No free fluid within the abdomen.  Vessels: No infrarenal aortic aneurysm. Moderate scattered  atherosclerotic calcifications of the abdominal aorta and iliac  arteries. Celiac axis and SMA and splenic artery calcifications.    Lung bases: No consolidation or pleural effusion. Bibasilar dependent  atelectasis.    Bones and soft tissues: No suspicious osseous lesions. Postoperative  changes of bilateral total hip arthroplasty. Degenerative changes to  the spine.      Impression    IMPRESSION:   1.  Intermediate density lesion within the interpolar region of the  left kidney measuring up to 3.1 cm this could be better characterized  with contrast enhanced CT however given the patient's renal function  or can consider dedicated renal ultrasound or comparison with outside  imaging if available. Probable right renal cyst. This may also be able  to be better evaluated with ultrasound or contrast-enhanced  cross-sectional imaging. Right renal stone and possible left renal  stone.  2.  Diverticulosis without evidence for acute diverticulitis.    I have personally reviewed the examination and initial interpretation  and I agree with the findings.    ASIF LUNA MD   US Abdomen Complete Portable    Narrative    EXAMINATION: US  ABDOMEN COMPLETE,  9/29/2020 10:14 AM     COMPARISON: CT abdomen and pelvis 9/27/2020    HISTORY: Removed cholecystitis in patient with elevated LFTs and DKA.  Left renal density on CT.    TECHNIQUE: The abdomen was scanned in standard fashion with  specialized ultrasound transducer(s) using both gray-scale and limited  color Doppler techniques.    FINDINGS:  Liver: The liver demonstrates normal homogeneous echotexture. Liver  measures 12.7 cm in length. No evidence of a focal hepatic mass.    Gallbladder:  There is no wall thickening, pericholecystic fluid,  positive sonographic Garza's sign or evidence for cholelithiasis.    Bile Ducts: Both the intra- and extrahepatic biliary system are of  normal caliber.  The common bile duct measures 4 mm in diameter.    Pancreas: Visualized portions of the head and body of the pancreas are  unremarkable.     Kidneys: Both kidneys are of normal echotexture, without  hydronephrosis.   The craniocaudal dimensions are: right- 10.5 cm,  left- 10.6 cm. There is some shadowing bowel gas with beam attenuation  and shadowing bowel gas overlying the left abdomen which limits  assessment of the lesion described on CT. There does appear to be an  anechoic area suggesting at least a portion of this is cystic.    Spleen: The spleen is normal in size,  measuring 11.6 cm in sagittal  dimension.    Aorta and IVC: The visualized portions of the aorta and IVC are  unremarkable. The proximal aorta measures 1.6 cm in diameter and the  IVC measures 1 cm in diameter.    Fluid: No evidence of ascites or pleural effusions.      Impression    IMPRESSION:   1.  Liver within normal limits.  2.  No gallstones or evidence of acute cholecystitis identified. No  biliary dilatation.  3.  The lesion in the mid left kidney is not well assessed  sonographically due to beam attenuation and shadowing bowel gas in  this region but a portion of the mass is partially visualized and  appears cystic. If the patient's  renal function permits, could  consider follow-up CT renal mass study. Correlation with any outside  imaging would also be helpful.    LEIDY ESPINOZA MD       Discharge Medications   Discharge Medication List as of 10/1/2020  1:00 PM      START taking these medications    Details   insulin aspart (NOVOLOG PEN) 100 UNIT/ML pen Inject 0-10 Units Subcutaneous 4 times daily (with meals and nightly) Use with meals and at bedtime per sliding scale and carb correction 1 unit for 10 g carbs, Disp-12 mL, R-2, Local Print         CONTINUE these medications which have CHANGED    Details   insulin glargine (LANTUS PEN) 100 UNIT/ML pen Inject 26 Units Subcutaneous At Bedtime, Disp-30 mL, R-11, E-PrescribeIf Lantus is not covered by insurance, may substitute Basaglar at same dose and frequency.           CONTINUE these medications which have NOT CHANGED    Details   aspirin (ASA) 81 MG chewable tablet Take 1 tablet (81 mg) by mouth daily, Disp-100 tablet,R-3, E-Prescribe      atorvastatin (LIPITOR) 40 MG tablet Take 1 tablet (40 mg) by mouth daily, Disp-90 tablet,R-3, E-Prescribe      carvedilol (COREG) 3.125 MG tablet Take 1 tablet (3.125 mg) by mouth 2 times daily (with meals), Disp-180 tablet,R-3, E-Prescribe      cyanocobalamin (VITAMIN B-12) 1000 MCG tablet Take 1 tablet (1,000 mcg) by mouth daily, Disp-100 tablet,R-3, E-Prescribe      hydrochlorothiazide (HYDRODIURIL) 25 MG tablet Take 1 tablet (25 mg) by mouth daily, Disp-90 tablet,R-3, E-Prescribe      insulin pen needle (31G X 8 MM) 31G X 8 MM miscellaneous Use 4 pen needles daily or as directed.Disp-300 each,[id number]      levETIRAcetam (KEPPRA) 500 MG tablet Take 1 tablet (500 mg) by mouth 2 times daily, Disp-180 tablet,R-3, E-Prescribe      Melatonin 10 MG TABS tablet Take 10 mg by mouth At Bedtime, Historical      NIFEdipine ER (ADALAT CC) 60 MG 24 hr tablet Take 1 tablet (60 mg) by mouth daily, Disp-90 tablet,R-3, E-Prescribe      pregabalin (LYRICA) 100 MG  capsule Take 1 capsule (100 mg) by mouth 2 times daily, Disp-60 capsule,R-0, E-Prescribe      SYNTHROID 88 MCG tablet Take 1 tablet (88 mcg) by mouth daily, Disp-90 tablet,R-3,RAMÍREZ, E-Prescribe      Vitamin D3 (CHOLECALCIFEROL) 25 mcg (1000 units) tablet Take 1 tablet (25 mcg) by mouth daily, Disp-100 tablet,R-3, E-Prescribe         STOP taking these medications       insulin lispro (HUMALOG KWIKPEN) 100 UNIT/ML (1 unit dial) KWIKPEN Comments:   Reason for Stopping:             Allergies   Allergies   Allergen Reactions     Pollen Extract Headache

## 2020-10-02 NOTE — PROGRESS NOTES
[facility]: Post-Discharge Note  SITUATION                                                      Admission:    Admission Date: 09/27/20   Reason for Admission: Diabetic Ketoacidosis  Discharge:   Discharge Date: 10/01/20  Discharge Diagnosis: Diabetic Ketoacidosis    BACKGROUND                                                      Isabell Matthews is a 60 year old female with poorly controlled IDDM-type 2, history of DKA, vascular dementia, CKD stage 3, history of stroke, history of MI/ CAD, hypothyroidism, seizure disorder, degenerative arthritis, HTN, HLD admitted on 9/27/2020 with altered mental status and glucoses that could not be controlled at home, found to be in DKA. She was treated with IV insulin and fluids with improvement in mental status and hyperglycemia. She was followed by the DM team and transitioned to a subQ insulin regimen with good control of blood sugars.     ASSESSMENT      Discharge Assessment  Patient reports symptoms are: Improved  Does the patient have all of their medications?: Yes  Does patient know what their new medications are for?: Yes  Does patient have a follow-up appointment scheduled?: Yes  Does patient have any other questions or concerns?: No    Post-op  Did the patient have surgery or a procedure: No  Fever: No  Chills: No  Eating & Drinking: eating and drinking without complaints/concerns  PO Intake: regular diet  Bowel Function: normal  Urinary Status: voiding without complaint/concerns        PLAN                                                      Outpatient Plan:  Follow-up Appointments     Adult [facility] Follow-up and recommended labs and tests      Follow up with primary care provider, Physician No Ref-Primary, within 7   days for hospital follow- up.  No follow up labs or test are needed.       Appointments on [place] and/or [facility] (with [facility] or [facility]   provider or service). Call [phone] if you haven't heard regarding   these appointments  within 7 days of discharge.         Follow up with Neurology to establish care for seizure disorder (referral placed on discharge)  Follow up with Cardiology to establish care for history of coronary artery disease (referral placed on discharge)     Consider CT renal mass study to further evaluate cystic interpolar lesion in the left kidney found on abdominal CT when kidney function returns to baseline.       No future appointments.        Richa Villagran, CMA

## 2020-10-02 NOTE — TELEPHONE ENCOUNTER
[facility] Call Center    Phone Message    May a detailed message be left on voicemail: yes     Reason for Call: Order(s): Home Care Orders: Skilled Nursing:  to help with diabetic teaching post hospital stay.  1 visit.         Action Taken: Patient transferred to: P[facility] and Message routed to:  [facility] P[facility]    Travel Screening: Not Applicable

## 2020-10-02 NOTE — TELEPHONE ENCOUNTER
Call and spoke to Caretaker Maria Guadalupe to assist in scheduling hospital follow up. Schedule virtual visit on Wed 10/8 at 11:30 AM as well as in-person visit with Dr Casatneda on Mon 11/2 at 11:30 AM. Appointment confirm by caretaker.

## 2020-10-03 LAB
BACTERIA SPEC CULT: NO GROWTH
BACTERIA SPEC CULT: NO GROWTH
SPECIMEN SOURCE: NORMAL
SPECIMEN SOURCE: NORMAL

## 2020-10-04 ENCOUNTER — MEDICAL CORRESPONDENCE (OUTPATIENT)
Dept: HEALTH INFORMATION MANAGEMENT | Facility: CLINIC | Age: 60
End: 2020-10-04

## 2020-10-04 LAB
BACTERIA SPEC CULT: NO GROWTH
SPECIMEN SOURCE: NORMAL

## 2020-10-05 ENCOUNTER — DOCUMENTATION ONLY (OUTPATIENT)
Dept: CARE COORDINATION | Facility: CLINIC | Age: 60
End: 2020-10-05

## 2020-10-05 ENCOUNTER — TELEPHONE (OUTPATIENT)
Dept: FAMILY MEDICINE | Facility: CLINIC | Age: 60
End: 2020-10-05

## 2020-10-05 NOTE — TELEPHONE ENCOUNTER
Called Mini:  Per Dr. Sapp, gave verbal orders to Mini for Order(s): Home Care Orders: Skilled Nursing:  Orders: PT & OT evaluation and  treat ,  assessment and skilled nursing.   2x week for 1 week, then 1x week for 3 weeks with  2 PRNs        dEwar Andrews CMA ([facility]) at 10:39 AM on 10/5/2020

## 2020-10-05 NOTE — TELEPHONE ENCOUNTER
Called [place]:  Per Dr. Castaneda, gave verbal orders to [place] for Order(s): Home Care Orders: Skilled Nursing:  to help with diabetic teaching post hospital stay.  1 visit.        Edwar Andrews CMA ([facility]) at 11:55 AM on 10/5/2020

## 2020-10-05 NOTE — TELEPHONE ENCOUNTER
[facility] Call Center    Phone Message    May a detailed message be left on voicemail: yes     Reason for Call: Order(s): Home Care Orders: Skilled Nursing:  Orders: PT & OT evaluation and  treat ,  assessment and skilled nursing.   2x week for 1 week, then 1x week for 3 weeks with  2 PRNs    verbal order needed today 10/5/2020    Action Taken: Message routed to:  [facility] PCC    Travel Screening: Not Applicable

## 2020-10-05 NOTE — PROGRESS NOTES
[facility] requesting Start of Care orders for...  SN 1 wk 4 , PT eval and treat, OT eval and treat, SW assessment        ROS      Physical Exam

## 2020-10-07 NOTE — TELEPHONE ENCOUNTER
RECORDS RECEIVED FROM:   DATE RECEIVED: 11-12   NOTES STATUS DETAILS   OFFICE NOTE from referring provider    Internal Dr. Castaneda 9-2-20   OFFICE NOTE from other cardiologist    In process    DISCHARGE SUMMARY from hospital    Internal 9-27-20   DISCHARGE REPORT from the ER   N/A    OPERATIVE REPORT    N/A    MEDICATION LIST   Internal    LABS     BMP   Internal 10-1-20   CBC   Internal 9-28-20   CMP   Internal 9-29-20   Lipids   N/A    TSH   Internal 9-27-20   DIAGNOSTIC PROCEDURES     EKG   Internal 9-27-20   Monitor Reports   N/A    IMAGING (DISC & REPORT)      Echo   N/A    Stress Tests   N/A    Cath   N/A    MRI/MRA   N/A    CT/CTA   N/A      Action    Action Taken 10-7: Requested from [facility]:    ED/Hospital discharge summaries    Cardiology OV note    Lab work    EKG Strips    Cardiac imaging (stress test, echo, CT chest, cMRI, cardiac cath)   2018 - present 2018 - present 2020 2018 - present 2018 - present    10-7: Requested from [facility]:    Office notes    Lab work    EKG strips   2018 - present 2018 - present 2018 - present     11-9: confirmed Bobby notes are in Epic  11-9: sent second request to [facility] now that DANELLE was signed and faxed  11-10: [facility] won't fax records as they mailed them out last week. Called and lvm for their medical records dept to see if they had any EKG strips they could fax us directly.  11-10: no EKG strips done there. Resolved some images from [facility].

## 2020-10-09 RX ORDER — LORATADINE 10 MG/1
10 TABLET ORAL DAILY
COMMUNITY
End: 2021-07-18

## 2020-10-09 NOTE — PROGRESS NOTES
10/01/2020  109, 134    10/02/2020  185, 217, 80, 246    10/03/2020  281, 249    10/04/2020  177, 215, 95, 108    10/05/2020  201, 283, 255    10/06/2020  117, 170, 111, 189    10/07/2020  74, 105, 138    10/08/2020  197, 169, 94    10/09/2020  76, 123

## 2020-10-09 NOTE — PROGRESS NOTES
"Isabell Matthews is a 60 year old female who is being evaluated via a billable video visit.      The patient has been notified of following:     \"This video visit will be conducted via a call between you and your physician/provider. We have found that certain health care needs can be provided without the need for an in-person physical exam.  This service lets us provide the care you need with a video conversation.  If a prescription is necessary we can send it directly to your pharmacy.  If lab work is needed we can place an order for that and you can then stop by our lab to have the test done at a later time.    Video visits are billed at different rates depending on your insurance coverage.  Please reach out to your insurance provider with any questions.    If during the course of the call the physician/provider feels a video visit is not appropriate, you will not be charged for this service.\"    Patient has given verbal consent for Video visit? Yes  *Text video visit link to: [phone] (ok for doximity)  How would you like to obtain your AVS? MyChart  Will anyone else be joining your video visit? No      Video-Visit Details    Type of service:  Video Visit    Distant Location (provider location):  [facility] BERYL Isabel MA    Outcome for 10/09/20 1:46 PM :Glucose data obtained via Phone and Located in Table Below           "

## 2020-10-12 ENCOUNTER — VIRTUAL VISIT (OUTPATIENT)
Dept: ENDOCRINOLOGY | Facility: CLINIC | Age: 60
End: 2020-10-12
Payer: MEDICARE

## 2020-10-12 ENCOUNTER — TELEPHONE (OUTPATIENT)
Dept: FAMILY MEDICINE | Facility: CLINIC | Age: 60
End: 2020-10-12

## 2020-10-12 DIAGNOSIS — Z86.39 HISTORY OF INSULIN DEPENDENT DIABETES MELLITUS: ICD-10-CM

## 2020-10-12 PROCEDURE — 99213 OFFICE O/P EST LOW 20 MIN: CPT | Mod: 95 | Performed by: PHYSICIAN ASSISTANT

## 2020-10-12 NOTE — TELEPHONE ENCOUNTER
Per Dr. Castaneda, gave verbal orders to Zainab for Home Care Orders of:    ORDER     OT treatment 1w3 for DME mgmt and increasing safety/independence with ADLs.         Edwar Andrews CMA ([facility]) at 11:26 AM on 10/12/2020

## 2020-10-12 NOTE — PROGRESS NOTES
"[facility]- Outpatient Diabetes Follow up  Isabell Matthews is a 60 year old female with uncontrolled insulin-dependent diabetes, hypothyroidism, CKD 3, hypertension, hyperlipidemia, history of DKA, vascular dementia, stroke, MD/CAD, seizure disorder, admitted 9/27/2020 with altered mental status and DKA. The inpatient diabetes service followed while she was hospitalized. Due to her history of dementia and confusion, her family (daughter) has been managing her insulin regimen at home. They declined TCU placement at discharge from this hospital stay.    Isabell and her daughter were on telephone visit today. They report that Isabell is doing well at home. BG have been quite stable compared to prior to her hospitalization. She has had a couple BG in 70's; Maria Guadalupe reports that she has been \"getting used to the new system\" at home. Most BG >200 were checked too soon after eating a meal/snack, or were in the setting of possible missed carb coverage for a snack. Maria Guadalupe wonders if giving insulin with a snack would be appropriate. Isabell is eating well, though she needs to be reminded to finish her meal at times. Much fewer incontinence episodes since she has left the hospital.     Maria Guadalupe notes that her mom's BG was high this morning. This was the first time this has happened since she has been home. She believes that Isabell was awake overnight eating, and she missed it. She covered breakfast (cereal and banana) with the fixed 3 units of insulin, along with 13 units for sliding scale. BG checked during this visit and read \"hi\". Maria Guadalupe reports that her mom is acting fine, and is not exhibiting any signs or symptoms of profound hyperglycemia or DKA. She was very surprised that her BG did not come down. We discussed giving additional 5 units of Novolog now, and rechecking. She is aware of need to go to the ER with persistent \"hi\" readings on her meter, concern for developing DKA.     Pt denies headaches, visual changes, n/v, SOB at " rest, chest pain, abd pain, nausea/vomiting, diarrhea, dysuria, hematuria or foot ulcers.      Current Diabetes Regimen:   Lantus 26 units daily  Novolog 3 units with meals  Novolog custom  sliding scale >140 AC, >200 HS    Glucometer Settings  Fastin-281 (avg 157)  Prandial:  (avg 174)  HS: 108-255 (avg 188)    Nutrition: Maria Guadalupe is making sure she eats three meals daily. Some snacks between meals. Possible overnight snacking.    Diabetes Care  Retinopathy: no. Last eye exam 1 year ago.     Nephropathy: BP well controlled. No Hx microalbuminuria. Creatinine 1.58 (stable). Taking ACEi/ARB: no    Neuropathy: yes, stable sx.    Lipids: Taking ASA: yes, statin: yes  No results found for: LDL       ROS: 10 point review of systems completed; pertinent positives and negatives documented in HPI      Allergies  Allergies   Allergen Reactions     Pollen Extract Headache       Medications  Current Outpatient Medications   Medication Sig Dispense Refill     aspirin (ASA) 81 MG chewable tablet Take 1 tablet (81 mg) by mouth daily 100 tablet 3     atorvastatin (LIPITOR) 40 MG tablet Take 1 tablet (40 mg) by mouth daily 90 tablet 3     carvedilol (COREG) 3.125 MG tablet Take 1 tablet (3.125 mg) by mouth 2 times daily (with meals) 180 tablet 3     cyanocobalamin (VITAMIN B-12) 1000 MCG tablet Take 1 tablet (1,000 mcg) by mouth daily 100 tablet 3     hydrochlorothiazide (HYDRODIURIL) 25 MG tablet Take 1 tablet (25 mg) by mouth daily 90 tablet 3     insulin aspart (NOVOLOG PEN) 100 UNIT/ML pen Inject 0-10 Units Subcutaneous 4 times daily (with meals and nightly) Use with meals and at bedtime per sliding scale and carb correction 1 unit for 10 g carbs 12 mL 2     insulin glargine (LANTUS PEN) 100 UNIT/ML pen Inject 26 Units Subcutaneous At Bedtime 30 mL 11     insulin pen needle (31G X 8 MM) 31G X 8 MM miscellaneous Use 4 pen needles daily or as directed. 300 each 4     levETIRAcetam (KEPPRA) 500 MG tablet Take 1 tablet  (500 mg) by mouth 2 times daily 180 tablet 3     loratadine (CLEAR-ATADINE) 10 MG tablet Take 10 mg by mouth daily       Melatonin 10 MG TABS tablet Take 10 mg by mouth At Bedtime       NIFEdipine ER (ADALAT CC) 60 MG 24 hr tablet Take 1 tablet (60 mg) by mouth daily 90 tablet 3     pregabalin (LYRICA) 100 MG capsule Take 1 capsule (100 mg) by mouth 2 times daily 60 capsule 0     SYNTHROID 88 MCG tablet Take 1 tablet (88 mcg) by mouth daily 90 tablet 3     Vitamin D3 (CHOLECALCIFEROL) 25 mcg (1000 units) tablet Take 1 tablet (25 mcg) by mouth daily 100 tablet 3       Family History  family history is not on file.    Social History   reports that she quit smoking about 2 years ago. Her smoking use included cigarettes. She has a 17.50 pack-year smoking history. She has never used smokeless tobacco. She reports previous alcohol use.     Past Medical History  Past Medical History:   Diagnosis Date     Chronic pain      Essential hypertension      Ex-cigarette smoker     quit 7/2018 over 35 years     Hyperlipidemia      Hypothyroid      Insulin-requiring or dependent type II diabetes mellitus (H)      Seizures (H)      Stroke (H)      Vascular dementia (H)        Past Surgical History:   Procedure Laterality Date     athroplasty hip Bilateral     2003, 2006       Physical Exam  There were no vitals taken for this visit.  There is no height or weight on file to calculate BMI.    GENERAL: In no apparent distress over the phone. Daughter present over the phone   RESP: normal respiratory effort. No cough  NEURO: awake, alert, responds appropriately to direct and simple questions.    Remainder of physical exam not able to be completed 2/2 COVID precaution, social distancing     RESULTS    Lab Results   Component Value Date    A1C 11.7 09/27/2020       Creatinine   Date Value Ref Range Status   10/01/2020 1.58 (H) 0.52 - 1.04 mg/dL Final     GFR Estimate   Date Value Ref Range Status   10/01/2020 35 (L) >60 mL/min/[1.73_m2]  "Final     Comment:     Non  GFR Calc  Starting 12/18/2018, serum creatinine based estimated GFR (eGFR) will be   calculated using the Chronic Kidney Disease Epidemiology Collaboration   (CKD-EPI) equation.       Hemoglobin A1C   Date Value Ref Range Status   09/27/2020 11.7 (H) 0 - 5.6 % Final     Comment:     Normal <5.7% Prediabetes 5.7-6.4%  Diabetes 6.5% or higher - adopted from ADA   consensus guidelines.       Potassium   Date Value Ref Range Status   10/01/2020 3.5 3.4 - 5.3 mmol/L Final     ALT   Date Value Ref Range Status   09/29/2020 19 0 - 50 U/L Final     AST   Date Value Ref Range Status   09/29/2020 26 0 - 45 U/L Final     TSH   Date Value Ref Range Status   09/27/2020 0.65 0.40 - 4.00 mU/L Final       TSH   Date Value Ref Range Status   09/27/2020 0.65 0.40 - 4.00 mU/L Final       Creatinine   Date Value Ref Range Status   10/01/2020 1.58 (H) 0.52 - 1.04 mg/dL Final       No results for input(s): CHOL, HDL, LDL, TRIG, CHOLHDLRATIO in the last 23785 hours.    No results found for: [id number], [id number], [id number]      ASSESSMENT/PLAN:    1. Diabetes type I, uncontrolled, with recent DKA, and with labile BG control   -most recent A1c 11.7% on 9/27/20   -Lantus 26 units daily   -Novolog 3 units as a base, to cover meals, plus custom 1/30 sliding scale AC/HS   -recommended to add Novolog 1-2 units with a snack    Regarding \"HI\" reading today: Maria Guadalupe thinks that Isabell ate something overnight and has precipitated hyperglycemia. It is unusual but not unheard of to have persistent hyperglycemia so late in the day from this, however the fixed 3 units for breakfast of cereal and banana may not have been enough to cover. Recommended to give Novolog 5 units now, and recheck BG frequently this afternoon. If not improving, and in particular, if mental status changes, recommend presentation to ER for rule out of DKA. Maria Guadalupe understands this and knows what signs to look for.     2. " Hypothyroidism:   -most recent TSH 0.65 on 9/27/20   -continue Levothyroxine 88mcg daily       Refills today:  1. Lantus    Follow up:  1. With [facility] in 3 months, sooner if issues.   2. Diabetes Educator follow up: PRN    The patient is  enrolled in [employer] services.    This patient has met [place] community measures for diabetes control: no (D5: Control blood pressure ,Lower bad cholesterol Maintain blood sugar, Be tobacco-free, Take aspirin as recommended)    This patient is eligible for graduation from [facility] clinic: no    Follow up was done today via phone encounter, due to high risk patients implementing social distancing due to COVID 19.     I spent a total of 20 minutes via telephone with patient, and  and 15 minutes chart review/coordination of care, troubleshooting of hyperglycemia.    Shira Luna PA-C  Diabetes Management Service  Pager [phone]  Job code pager [phone]

## 2020-10-12 NOTE — PATIENT INSTRUCTIONS
"1. Continue insulin regimen as per hospital discharge. We should assess for dose changes if Isabell continues to have \"hi\" readings on meter, even if they come down with sliding scale.   2. Try administering 1-2 units of Novolog with a snack; this should help to keep BG stable. If possible, wait at least two hours following a snack to recheck a blood sugar for sliding scale administration.  3. If Isabell has continued \"hi\" readings on meter today, and especially if concerning signs or symptoms of DKA arise, she should be evaluated in the ER for DKA.     Schedule ophthalmology appointment within the next 3 months, to follow up history of cataract removal, and to assess for diabetic eye changes (retinopathy).   "

## 2020-10-12 NOTE — LETTER
"10/12/2020       RE: Isabell Matthews  [address]  [place] [address]     Dear Colleague,    Thank you for referring your patient, Isabell Matthews, to the [facility] [place] at [facility]. Please see a copy of my visit note below.    Isabell Matthews is a 60 year old female who is being evaluated via a billable video visit.      The patient has been notified of following:     \"This video visit will be conducted via a call between you and your physician/provider. We have found that certain health care needs can be provided without the need for an in-person physical exam.  This service lets us provide the care you need with a video conversation.  If a prescription is necessary we can send it directly to your pharmacy.  If lab work is needed we can place an order for that and you can then stop by our lab to have the test done at a later time.    Video visits are billed at different rates depending on your insurance coverage.  Please reach out to your insurance provider with any questions.    If during the course of the call the physician/provider feels a video visit is not appropriate, you will not be charged for this service.\"    Patient has given verbal consent for Video visit? Yes  *Text video visit link to: [phone] (ok for doximity)  How would you like to obtain your AVS? MyChart  Will anyone else be joining your video visit? No      Video-Visit Details    Type of service:  Video Visit    Distant Location (provider location):  [facility] [place]     Juju Isabel MA    Outcome for 10/09/20 1:46 PM :Glucose data obtained via Phone and Located in Table Below             10/01/2020  109, 134    10/02/2020  185, 217, 80, 246    10/03/2020  281, 249    10/04/2020  177, 215, 95, 108    10/05/2020  201, 283, 255    10/06/2020  117, 170, 111, 189    10/07/2020  74, 105, 138    10/08/2020  197, 169, 94    10/09/2020  76, " "123      [facility]  Isabell Matthews is a 60 year old female with uncontrolled insulin-dependent diabetes, hypothyroidism, CKD 3, hypertension, hyperlipidemia, history of DKA, vascular dementia, stroke, MD/CAD, seizure disorder, admitted 9/27/2020 with altered mental status and DKA. The inpatient diabetes service followed while she was hospitalized. Due to her history of dementia and confusion, her family (daughter) has been managing her insulin regimen at home. They declined TCU placement at discharge from this hospital stay.    Isabell and her daughter were on telephone visit today. They report that Isabell is doing well at home. BG have been quite stable compared to prior to her hospitalization. She has had a couple BG in 70's; Maria Guadalupe reports that she has been \"getting used to the new system\" at home. Most BG >200 were checked too soon after eating a meal/snack, or were in the setting of possible missed carb coverage for a snack. Maria Guadalupe wonders if giving insulin with a snack would be appropriate. Isabell is eating well, though she needs to be reminded to finish her meal at times. Much fewer incontinence episodes since she has left the hospital.     Maria Guadalupe notes that her mom's BG was high this morning. This was the first time this has happened since she has been home. She believes that Isabell was awake overnight eating, and she missed it. She covered breakfast (cereal and banana) with the fixed 3 units of insulin, along with 13 units for sliding scale. BG checked during this visit and read \"hi\". Maria Guadalupe reports that her mom is acting fine, and is not exhibiting any signs or symptoms of profound hyperglycemia or DKA. She was very surprised that her BG did not come down. We discussed giving additional 5 units of Novolog now, and rechecking. She is aware of need to go to the ER with persistent \"hi\" readings on her meter, concern for developing DKA.     Pt denies headaches, visual changes, n/v, " SOB at rest, chest pain, abd pain, nausea/vomiting, diarrhea, dysuria, hematuria or foot ulcers.      Current Diabetes Regimen:   Lantus 26 units daily  Novolog 3 units with meals  Novolog custom  sliding scale >140 AC, >200 HS    Glucometer Settings  Fastin-281 (avg 157)  Prandial:  (avg 174)  HS: 108-255 (avg 188)    Nutrition: Maria Guadalupe is making sure she eats three meals daily. Some snacks between meals. Possible overnight snacking.    Diabetes Care  Retinopathy: no. Last eye exam 1 year ago.     Nephropathy: BP well controlled. No Hx microalbuminuria. Creatinine 1.58 (stable). Taking ACEi/ARB: no    Neuropathy: yes, stable sx.    Lipids: Taking ASA: yes, statin: yes  No results found for: LDL       ROS: 10 point review of systems completed; pertinent positives and negatives documented in HPI      Allergies  Allergies   Allergen Reactions     Pollen Extract Headache       Medications  Current Outpatient Medications   Medication Sig Dispense Refill     aspirin (ASA) 81 MG chewable tablet Take 1 tablet (81 mg) by mouth daily 100 tablet 3     atorvastatin (LIPITOR) 40 MG tablet Take 1 tablet (40 mg) by mouth daily 90 tablet 3     carvedilol (COREG) 3.125 MG tablet Take 1 tablet (3.125 mg) by mouth 2 times daily (with meals) 180 tablet 3     cyanocobalamin (VITAMIN B-12) 1000 MCG tablet Take 1 tablet (1,000 mcg) by mouth daily 100 tablet 3     hydrochlorothiazide (HYDRODIURIL) 25 MG tablet Take 1 tablet (25 mg) by mouth daily 90 tablet 3     insulin aspart (NOVOLOG PEN) 100 UNIT/ML pen Inject 0-10 Units Subcutaneous 4 times daily (with meals and nightly) Use with meals and at bedtime per sliding scale and carb correction 1 unit for 10 g carbs 12 mL 2     insulin glargine (LANTUS PEN) 100 UNIT/ML pen Inject 26 Units Subcutaneous At Bedtime 30 mL 11     insulin pen needle (31G X 8 MM) 31G X 8 MM miscellaneous Use 4 pen needles daily or as directed. 300 each 4     levETIRAcetam (KEPPRA) 500 MG tablet Take 1  tablet (500 mg) by mouth 2 times daily 180 tablet 3     loratadine (CLEAR-ATADINE) 10 MG tablet Take 10 mg by mouth daily       Melatonin 10 MG TABS tablet Take 10 mg by mouth At Bedtime       NIFEdipine ER (ADALAT CC) 60 MG 24 hr tablet Take 1 tablet (60 mg) by mouth daily 90 tablet 3     pregabalin (LYRICA) 100 MG capsule Take 1 capsule (100 mg) by mouth 2 times daily 60 capsule 0     SYNTHROID 88 MCG tablet Take 1 tablet (88 mcg) by mouth daily 90 tablet 3     Vitamin D3 (CHOLECALCIFEROL) 25 mcg (1000 units) tablet Take 1 tablet (25 mcg) by mouth daily 100 tablet 3       Family History  family history is not on file.    Social History   reports that she quit smoking about 2 years ago. Her smoking use included cigarettes. She has a 17.50 pack-year smoking history. She has never used smokeless tobacco. She reports previous alcohol use.     Past Medical History  Past Medical History:   Diagnosis Date     Chronic pain      Essential hypertension      Ex-cigarette smoker     quit 7/2018 over 35 years     Hyperlipidemia      Hypothyroid      Insulin-requiring or dependent type II diabetes mellitus (H)      Seizures (H)      Stroke (H)      Vascular dementia (H)        Past Surgical History:   Procedure Laterality Date     athroplasty hip Bilateral     2003, 2006       Physical Exam  There were no vitals taken for this visit.  There is no height or weight on file to calculate BMI.    GENERAL: In no apparent distress over the phone. Daughter present over the phone   RESP: normal respiratory effort. No cough  NEURO: awake, alert, responds appropriately to direct and simple questions.    Remainder of physical exam not able to be completed 2/2 COVID precaution, social distancing     RESULTS    Lab Results   Component Value Date    A1C 11.7 09/27/2020       Creatinine   Date Value Ref Range Status   10/01/2020 1.58 (H) 0.52 - 1.04 mg/dL Final     GFR Estimate   Date Value Ref Range Status   10/01/2020 35 (L) >60  "mL/min/[1.73_m2] Final     Comment:     Non  GFR Calc  Starting 12/18/2018, serum creatinine based estimated GFR (eGFR) will be   calculated using the Chronic Kidney Disease Epidemiology Collaboration   (CKD-EPI) equation.       Hemoglobin A1C   Date Value Ref Range Status   09/27/2020 11.7 (H) 0 - 5.6 % Final     Comment:     Normal <5.7% Prediabetes 5.7-6.4%  Diabetes 6.5% or higher - adopted from ADA   consensus guidelines.       Potassium   Date Value Ref Range Status   10/01/2020 3.5 3.4 - 5.3 mmol/L Final     ALT   Date Value Ref Range Status   09/29/2020 19 0 - 50 U/L Final     AST   Date Value Ref Range Status   09/29/2020 26 0 - 45 U/L Final     TSH   Date Value Ref Range Status   09/27/2020 0.65 0.40 - 4.00 mU/L Final       TSH   Date Value Ref Range Status   09/27/2020 0.65 0.40 - 4.00 mU/L Final       Creatinine   Date Value Ref Range Status   10/01/2020 1.58 (H) 0.52 - 1.04 mg/dL Final       No results for input(s): CHOL, HDL, LDL, TRIG, CHOLHDLRATIO in the last 29755 hours.    No results found for: [id number], [id number], [id number]      ASSESSMENT/PLAN:    1. Diabetes type I, uncontrolled, with recent DKA, and with labile BG control   -most recent A1c 11.7% on 9/27/20   -Lantus 26 units daily   -Novolog 3 units as a base, to cover meals, plus custom 1/30 sliding scale AC/HS   -recommended to add Novolog 1-2 units with a snack    Regarding \"HI\" reading today: Maria Guadalupe thinks that Isabell ate something overnight and has precipitated hyperglycemia. It is unusual but not unheard of to have persistent hyperglycemia so late in the day from this, however the fixed 3 units for breakfast of cereal and banana may not have been enough to cover. Recommended to give Novolog 5 units now, and recheck BG frequently this afternoon. If not improving, and in particular, if mental status changes, recommend presentation to ER for rule out of DKA. Maria Guadalupe understands this and knows what signs to look for.     2. " Hypothyroidism:   -most recent TSH 0.65 on 9/27/20   -continue Levothyroxine 88mcg daily       Refills today:  1. Lantus    Follow up:  1. With [facility] in 3 months, sooner if issues.   2. Diabetes Educator follow up: PRN    The patient is  enrolled in [employer] services.    This patient [place] met [place] community measures for diabetes control: no (D5: Control blood pressure ,Lower bad cholesterol Maintain blood sugar, Be tobacco-free, Take aspirin as recommended)    This patient is eligible for graduation from [facility]: no    Follow up was done today via phone encounter, due to high risk patients implementing social distancing due to COVID 19.     I spent a total of 20 minutes via telephone with patient, and  and 15 minutes chart review/coordination of care, troubleshooting of hyperglycemia.    Shira Luna PA-C  Diabetes Management Service  Pager [phone]  Job code pager [phone]

## 2020-10-12 NOTE — TELEPHONE ENCOUNTER
[facility] now requests orders and shares plan of care/discharge summaries for some patients through [employer].  Please REPLY TO THIS MESSAGE OR ROUTE BACK TO THE AUTHOR in order to give authorization for orders when needed.  This is considered a verbal order, you will still receive a faxed copy of orders for signature.  Thank you for your assistance in improving collaboration for our patients.    ORDER    OT treatment 1w3 for DME mgmt and increasing safety/independence with ADLs.     IVY Coelho/L  [facility]  [email or web address]

## 2020-10-14 ENCOUNTER — DOCUMENTATION ONLY (OUTPATIENT)
Dept: CARDIOLOGY | Facility: CLINIC | Age: 60
End: 2020-10-14

## 2020-10-14 NOTE — PROGRESS NOTES
Action 10.14.20 JM   Action Taken Received records from [facility] in [place]. Sent to scanning.

## 2020-10-19 ENCOUNTER — MEDICAL CORRESPONDENCE (OUTPATIENT)
Dept: HEALTH INFORMATION MANAGEMENT | Facility: CLINIC | Age: 60
End: 2020-10-19

## 2020-10-20 DIAGNOSIS — G62.9 NEUROPATHY: Primary | ICD-10-CM

## 2020-10-20 DIAGNOSIS — M47.899 OTHER OSTEOARTHRITIS OF SPINE, UNSPECIFIED SPINAL REGION: ICD-10-CM

## 2020-10-20 NOTE — TELEPHONE ENCOUNTER
[facility] Call Center    Phone Message    May a detailed message be left on voicemail: yes     Reason for Call: Medication Refill Request    Has the patient contacted the pharmacy for the refill? Yes   Name of medication being requested: pregabalin (LYRICA) 100 MG capsule  Provider who prescribed the medication: Bony Castaneda  Pharmacy: [address] [address]  Date medication is needed: 10/20/2020         Action Taken: Message routed to:  [facility]    Travel Screening: Not Applicable

## 2020-10-21 RX ORDER — PREGABALIN 100 MG/1
100 CAPSULE ORAL 2 TIMES DAILY
Qty: 60 CAPSULE | Refills: 0 | Status: SHIPPED | OUTPATIENT
Start: 2020-10-21 | End: 2020-12-02

## 2020-10-21 NOTE — TELEPHONE ENCOUNTER
Patient Requested  pregabalin (LYRICA) 100 MG capsule  Last Filled  09/02/2020  Last Office Visit  09/02/2020  Next Office Visit     Checked  10/21/2020    DX: Other osteoarthritis of spine, unspecified spinal region     Pharmacy: [id number] - [place], [place] - [address] AT [place] at 6:46 AM on 10/21/2020.

## 2020-10-21 NOTE — TELEPHONE ENCOUNTER
Isabell chart reviewed today for refill request.    Diagnoses and all orders for this visit:    Neuropathy  -     pregabalin (LYRICA) 100 MG capsule; Take 1 capsule (100 mg) by mouth 2 times daily    Other osteoarthritis of spine, unspecified spinal region  -     pregabalin (LYRICA) 100 MG capsule; Take 1 capsule (100 mg) by mouth 2 times daily    Bony Castaneda MD

## 2020-10-22 ENCOUNTER — VIRTUAL VISIT (OUTPATIENT)
Dept: FAMILY MEDICINE | Facility: CLINIC | Age: 60
End: 2020-10-22
Payer: MEDICARE

## 2020-10-22 DIAGNOSIS — I10 BENIGN ESSENTIAL HYPERTENSION: ICD-10-CM

## 2020-10-22 DIAGNOSIS — N39.46 MIXED STRESS AND URGE URINARY INCONTINENCE: ICD-10-CM

## 2020-10-22 DIAGNOSIS — N20.0 NEPHROLITHIASIS: ICD-10-CM

## 2020-10-22 DIAGNOSIS — F01.53 VASCULAR DEMENTIA WITH DEPRESSED MOOD (H): Primary | ICD-10-CM

## 2020-10-22 DIAGNOSIS — N28.89 LEFT RENAL MASS: ICD-10-CM

## 2020-10-22 DIAGNOSIS — N18.32 STAGE 3B CHRONIC KIDNEY DISEASE (H): ICD-10-CM

## 2020-10-22 DIAGNOSIS — M25.50 MULTIPLE JOINT PAIN: ICD-10-CM

## 2020-10-22 DIAGNOSIS — Z86.39 HISTORY OF INSULIN DEPENDENT DIABETES MELLITUS: ICD-10-CM

## 2020-10-22 PROCEDURE — 99214 OFFICE O/P EST MOD 30 MIN: CPT | Mod: 95 | Performed by: FAMILY MEDICINE

## 2020-10-22 NOTE — NURSING NOTE
Chief Complaint   Patient presents with     Recheck Medication     med check     Hospital F/U     hospital follow up     Kimberly Nissen, EMT at 11:07 AM on 10/22/2020

## 2020-10-22 NOTE — PROGRESS NOTES
"Isabell Matthews is a 60 year old female who is being evaluated via a billable video visit.      The patient has been notified of following:     \"This video visit will be conducted via a call between you and your physician/provider. We have found that certain health care needs can be provided without the need for an in-person physical exam.  This service lets us provide the care you need with a video conversation.  If a prescription is necessary we can send it directly to your pharmacy.  If lab work is needed we can place an order for that and you can then stop by our lab to have the test done at a later time.    Video visits are billed at different rates depending on your insurance coverage.  Please reach out to your insurance provider with any questions.    If during the course of the call the physician/provider feels a video visit is not appropriate, you will not be charged for this service.\"    Patient has given verbal consent for Video visit? Yes  How would you like to obtain your AVS? MyChart  If you are dropped from the video visit, the video invite should be resent to: Send to e-mail at: [email or web address]  Will anyone else be joining your video visit? No     Amwell not working switched to D[employer]      Subjective     Isabell Matthews is a 60 year old female who presents today via video visit for the following health issues:    HPI  Lisa Matthews is a 60-year-old female who presents via video visit today with her daughter Charli and to establish primary care and refill medications.  She has a complicated past medical history which includes diabetes insulin requiring with history of ketoacidosis, renal impairment, hypothyroidism, coronary artery disease with MI, hyperlipidemia, hypertension, seizure disorder, vascular dementia degernative arthritis and left  renal mass.  She recently moved to the [facility] now living in [place].  Her daughter is providing most of her cares and servicing as her PCA. She was " hospitalized and now is receiving home care through [facility], OT has working with them but PT has not been out yet, they have a  but nursing has been minimally involved.  I discussed the importance of assessment for determining PCA hours as with her significant cognitive impairment related to vascular dementia, complicated diabetes cares, stroke, mobility issues she requires significant cares and is not able to be left alone for very long periods of time.  Her pain has been increased since hospitalization, has been without Lyrica for 4 days, she ran out of Lyrica 100 mg twice daily but I did  send a prescription on October 21 to their pharmacy.  She also has significant joint pains in the shoulders cervical spine and knees home and would like x-rays prior to the visit with me on November 2.  I reviewed CT scan showed left renal mass.  Her daughter indicates there was a biopsy of the mass previously and she will try to obtain records.  I also discussed there is nephrolithiasis in both kidneys which may be contributing to some of her pain.  Urinary incontinence has increased after slight improvement immediately post discharge but Lisa has been drinking more tea up to 5 cups/day.  I reviewed she needs to limit her tea to 1 cup daily because this may be contributing to urinary frequency as well as nephrolithiasis.  They had a follow-up appointment with endocrinology for management of diabetes Lantus was increased from 18-26 with improvement of her blood sugars.   She indicates she also has significant arthritis of her back and bilateral hips with previous hip replacement.  She has significant pain.    Needs refills on medication has health insurance through [facility]. Lives in [facility].  I reviewed her medications in detail with her daughter.  At this time she is on aspirin 81 mg previously was on Brilinta however her providers indicated due to cost they could switch to aspirin.   They will try to  get influenza vaccine through local pharmacy otherwise will provide at her follow-up visit with me November 2.   Review of Systems   No current viral illness. Glucose have been 100-300s No current chest pain      Patient Active Problem List   Diagnosis     Benign essential hypertension     Acquired hypothyroidism     Seizures (H)     Hyperlipidemia LDL goal <100     History of insulin dependent diabetes mellitus     Vitamin D deficiency     Vitamin B12 deficiency (non anemic)     Other osteoarthritis of spine, unspecified spinal region     DKA (diabetic ketoacidoses) (H)     Nephrolithiasis     Left renal mass     Mixed stress and urge urinary incontinence     Past Medical History:   Diagnosis Date     Chronic pain      Essential hypertension      Ex-cigarette smoker     quit 7/2018 over 35 years     Hyperlipidemia      Hypothyroid      Insulin-requiring or dependent type II diabetes mellitus (H)      Seizures (H)      Stroke (H)      Vascular dementia (H)      Past Surgical History:   Procedure Laterality Date     athroplasty hip Bilateral     2003, 2006   Current non smoker   SH Lives with her daughter Charli who provides cares.    Current Outpatient Medications   Medication Sig Dispense Refill     aspirin (ASA) 81 MG chewable tablet Take 1 tablet (81 mg) by mouth daily 100 tablet 3     atorvastatin (LIPITOR) 40 MG tablet Take 1 tablet (40 mg) by mouth daily 90 tablet 3     carvedilol (COREG) 3.125 MG tablet Take 1 tablet (3.125 mg) by mouth 2 times daily (with meals) 180 tablet 3     cyanocobalamin (VITAMIN B-12) 1000 MCG tablet Take 1 tablet (1,000 mcg) by mouth daily 100 tablet 3     hydrochlorothiazide (HYDRODIURIL) 25 MG tablet Take 1 tablet (25 mg) by mouth daily 90 tablet 3     insulin aspart (NOVOLOG PEN) 100 UNIT/ML pen Inject 0-10 Units Subcutaneous 4 times daily (with meals and nightly) Use with meals and at bedtime per sliding scale and carb correction 1 unit for 10 g carbs 12 mL 2     insulin  glargine (LANTUS PEN) 100 UNIT/ML pen Inject 26 Units Subcutaneous At Bedtime 30 mL 11     insulin pen needle (31G X 8 MM) 31G X 8 MM miscellaneous Use 4 pen needles daily or as directed. 300 each 4     levETIRAcetam (KEPPRA) 500 MG tablet Take 1 tablet (500 mg) by mouth 2 times daily 180 tablet 3     loratadine (CLEAR-ATADINE) 10 MG tablet Take 10 mg by mouth daily       Melatonin 10 MG TABS tablet Take 10 mg by mouth At Bedtime       NIFEdipine ER (ADALAT CC) 60 MG 24 hr tablet Take 1 tablet (60 mg) by mouth daily 90 tablet 3     pregabalin (LYRICA) 100 MG capsule Take 1 capsule (100 mg) by mouth 2 times daily 60 capsule 0     SYNTHROID 88 MCG tablet Take 1 tablet (88 mcg) by mouth daily 90 tablet 3     Vitamin D3 (CHOLECALCIFEROL) 25 mcg (1000 units) tablet Take 1 tablet (25 mcg) by mouth daily 100 tablet 3     Objective           Vitals:  No vitals were obtained today due to virtual visit.    Physical Exam   Lisa was present for the visit and was interactive sitting with her daughter.  She appears to have slow cognition but was interactive and answered appropriately with speech coherent.  She appeared in no acute distress and was appropriately groomed.  Teeth appear to be in poor health but difficult to evaluate from video visit.  Pulmonary no cough no use of accessory muscles no signs of distress.  Psychiatric mood appeared to be quiet but interactive.  Her daughter had to redirect her several times to avoid pulling on cords.  Neurological patient did not communicate her medications and let her daughter explain her health history.  I was not able to ascertain if there were any significant weakness daughter indicated most of the stroke impact was related to cognition.  Remainder physical exam unable to perform due to video visit.        Assessment & Plan     Lisa Caceres is a 60-year-old female with complicated PMH who presents today via video visit with her daughter after establish primary care, recent  hospitalization for diabetes and review of CT scan results which showed mass of the left kidney nephrolithiasis renal impairment with GFR of 35 creatinine of 1.56.  Her daughter indicates her kidney function was previously worse and was happy to learn of the improvement.  I have recommended urology visit for next steps related to the renal mass kidney stones and urinary incontinence.    Blood pressure currently under control with current medications.  She is currently having significant joint pain cervical spine left shoulder right shoulder and knees we will obtain x-rays prior to visit on November 2.  Chronic pain medication Lyrica 100 mg twice daily was sent to pharmacy on October 21.  I discussed importance of completing an advanced directive which could be obtained through [facility] which is currently involved in her cares after recent hospitalization.  She also needs to be assessed for PCA hours would imagine she would qualify for at least 4 to 8 hours/day as she appears to be full cares but this needs to be assessed through RN OT PT and social service.    I discussed the need for an in person visit to review in more detail her health history and also requested release of medical records.    They also need hangtag form for handicapped parking we will complete at visit  11/2 in person  She requires lab and XRAY prior to visit 11/2  Isabell was seen today for recheck medication and hospital f/u.    Diagnoses and all orders for this visit:    Vascular dementia with depressed mood (H)    Multiple joint pain  -     XR Cervical Spine 2-3 Views; Future  -     XR Knee Bilateral 3 Views; Future  -     XR Shoulder Left 2 Views; Future  -     XR Shoulder Right 2 Views; Future    Left renal mass  -     UROLOGY ADULT REFERRAL; Future  -     Cytology non gyn; Future  -     UA with Micro reflex to Culture; Future  -     Comprehensive metabolic panel; Future    Nephrolithiasis  -     UA with Micro reflex to Culture;  Future    Mixed stress and urge urinary incontinence  -     UA with Micro reflex to Culture; Future    Benign essential hypertension  -     Comprehensive metabolic panel; Future    History of insulin dependent diabetes mellitus  -     Comprehensive metabolic panel; Future    Stage 3b chronic kidney disease  -     Comprehensive metabolic panel; Future      Dorina Argueta RN Santilli, Bony CASTANEDA MD             Hi Dr. Castaneda,   I called home care, and was told to call [facility] to request PCA nursing eval.  When I called [facility], the staff relayed that the PCA nursing eval was already requested yesterday.     ThanksDorina      Above noted 3:07 PM October 23, 2020  Bony Castaneda MD  [facility]      Video-Visit Details    Type of service:  Video Visit    Video  Time:11:32-12:05    Originating Location (pt. Location): Home    Distant Location (provider location):  [facility]     Platform used for Video Visit: [employer]

## 2020-10-22 NOTE — Clinical Note
Contact home care to add assessment for PCA hours send my visit note.  Best wishes,  Bony Castaneda MD

## 2020-10-27 ENCOUNTER — ANCILLARY PROCEDURE (OUTPATIENT)
Dept: GENERAL RADIOLOGY | Facility: CLINIC | Age: 60
End: 2020-10-27
Attending: FAMILY MEDICINE
Payer: MEDICARE

## 2020-10-27 ENCOUNTER — TELEPHONE (OUTPATIENT)
Dept: FAMILY MEDICINE | Facility: CLINIC | Age: 60
End: 2020-10-27

## 2020-10-27 DIAGNOSIS — M25.50 MULTIPLE JOINT PAIN: ICD-10-CM

## 2020-10-27 PROCEDURE — 72040 X-RAY EXAM NECK SPINE 2-3 VW: CPT | Mod: GC | Performed by: RADIOLOGY

## 2020-10-27 PROCEDURE — 73030 X-RAY EXAM OF SHOULDER: CPT | Mod: LT | Performed by: RADIOLOGY

## 2020-10-27 PROCEDURE — 73030 X-RAY EXAM OF SHOULDER: CPT | Mod: RT | Performed by: RADIOLOGY

## 2020-10-27 PROCEDURE — 73560 X-RAY EXAM OF KNEE 1 OR 2: CPT | Mod: RT | Performed by: RADIOLOGY

## 2020-10-27 NOTE — TELEPHONE ENCOUNTER
[facility] Call Center    Phone Message    May a detailed message be left on voicemail: yes     Reason for Call: Order(s): Home Care Orders: Other: .social work revisit tomorrow if possible - notified caller of 24-48 hour turn around time.  Pt trying to get health care directive completed and go over community resources.    Action Taken: Message routed to:  [facility]    Travel Screening: Not Applicable

## 2020-10-28 ENCOUNTER — TELEPHONE (OUTPATIENT)
Dept: FAMILY MEDICINE | Facility: CLINIC | Age: 60
End: 2020-10-28

## 2020-10-28 NOTE — TELEPHONE ENCOUNTER
[facility] now requests orders and shares plan of care/discharge summaries for some patients through [facility].  Please REPLY TO THIS MESSAGE OR ROUTE BACK TO THE AUTHOR in order to give authorization for orders when needed.  This is considered a verbal order, you will still receive a faxed copy of orders for signature.  Thank you for your assistance in improving collaboration for our patients.    ORDER    Continued OT treatment 1w1 for DME mgmt and education on cognitive assessment results.     Extension of original PT eval order for 1d14 due to scheduling conflicts.    Zainab Castrejon OTR/L  [facility]  [email or web address]

## 2020-10-28 NOTE — TELEPHONE ENCOUNTER
Verbal orders given to [facility] from Home Care, per Dr. Castaneda, for Home Care Orders: Other: .social work. Veronica Thompson LPN 10/28/2020 2:37 PM

## 2020-10-29 NOTE — TELEPHONE ENCOUNTER
MEDICAL RECORDS REQUEST   [facility] Clinic  [address] [place] [address]  PHONE: [phone]  Fax: [phone]        FUTURE VISIT INFORMATION                                                   Isabell Matthews : 1960 scheduled for future visit at [facility]    APPOINTMENT INFORMATION:    Date: 10/30/20 11AM    Provider:  Tay Briones MD    Reason for Visit/Diagnosis: Renal mass    REFERRAL INFORMATION:    Referring provider:  N/A    Specialty: N/A    Referring providers clinic:      Clinic contact number:  N/A    RECORDS REQUESTED FOR VISIT                                                     NOTES  STATUS/DETAILS   OFFICE NOTE from referring provider  yes   OFFICE NOTE from other specialist  no   DISCHARGE SUMMARY from hospital  no   DISCHARGE REPORT from the ER  no   OPERATIVE REPORT  no   MEDICATION LIST  no   KIDNEY MASS     CT STONE  yes     PRE-VISIT CHECKLIST      Record collection complete Yes- Internal recs in epic  Images in [facility] PACS    Appointment appropriately scheduled           (right time/right provider) Yes   MyChart activation Yes   Questionnaire complete If no, please explain: In process      Completed by: Bela Andrews

## 2020-10-30 ENCOUNTER — PRE VISIT (OUTPATIENT)
Dept: UROLOGY | Facility: CLINIC | Age: 60
End: 2020-10-30

## 2020-10-30 ENCOUNTER — OFFICE VISIT (OUTPATIENT)
Dept: UROLOGY | Facility: CLINIC | Age: 60
End: 2020-10-30
Payer: MEDICARE

## 2020-10-30 VITALS — HEART RATE: 89 BPM | DIASTOLIC BLOOD PRESSURE: 73 MMHG | SYSTOLIC BLOOD PRESSURE: 134 MMHG

## 2020-10-30 DIAGNOSIS — N28.89 LEFT RENAL MASS: Primary | ICD-10-CM

## 2020-10-30 DIAGNOSIS — N20.0 NEPHROLITHIASIS: ICD-10-CM

## 2020-10-30 PROCEDURE — 99204 OFFICE O/P NEW MOD 45 MIN: CPT | Performed by: UROLOGY

## 2020-10-30 RX ORDER — INSULIN LISPRO 100 [IU]/ML
INJECTION, SOLUTION INTRAVENOUS; SUBCUTANEOUS
COMMUNITY
Start: 2020-09-14 | End: 2020-12-07

## 2020-10-30 ASSESSMENT — PAIN SCALES - GENERAL: PAINLEVEL: MODERATE PAIN (5)

## 2020-10-30 NOTE — PATIENT INSTRUCTIONS
Please get MRI and follow up with  with a video appointment after MRI   Please make a appointment with , or     It was a pleasure meeting with you today.  Thank you for allowing me and my team the privilege of caring for you today.  YOU are the reason we are here, and I truly hope we provided you with the excellent service you deserve.  Please let us know if there is anything else we can do for you so that we can be sure you are leaving completely satisfied with your care experience.

## 2020-10-30 NOTE — LETTER
10/30/2020       RE: Isabell Matthews  [address]  [place] [address]     Dear Colleague,    Thank you for referring your patient, Isabell Matthews, to the [facility] [place] [facility] Please see a copy of my visit note below.    ASSESSMENT AND PLAN  3 cm left  renal mass that is poorly characterized with CT Abdomen/Pelvis without contrast and renal ultrasound.  This is complicated by creatinine of 1.6-1.8.    During counseling for this visit, we covered the risk of renal cell carcinoma and how this renal mass may be a non-cancerous lesion.  We covered options including observation, cryoablation, partial nephrectomy, and nephrectomy.  We covered different surgical approaches such as percutaneous, laparoscopic, robotic, and open surgery.  We covered the risk of observation which is metastatic spread and need for continued observation.  We covered surgical risks which include but are not limited to heart attack, stroke, blood clot in the legs or lungs, death, injury to surrounding organs (intestine, liver, spleen, pancreas, lung, muscles, nerves), hernias, loss of sensation around incisions, decreased renal function, and infection.  We discussed how blood transfusion may be necessary and the risks are viral illnesses such as HIV(AIDS) and Hepatitis.  Additional procedures may be necessary in the perioperative period.  If minimally invasive techniques are used it may be necessary to convert to open surgery, and if partial nephrectomy is attempted than full nephrectomy may be required.    At this point we need to better characterize the mass more thoroughly.  I will plan for MRI Abdomen With and Without Contrast.        Small right kidney stones.  During counseling for this visit, we covered the natural history of kidney stones, the risk of progression to symptomatic pain/infection, and the possibility of renal failure/kidney damage.  We covered  treatment options including observation, ureteroscopy, extracorporeal shock wave lithotripsy (ESWL), and percutaneous nephrolithotomy (PCNL).  We covered surgical risks which include but are not limited to heart attack, stroke, blood clot in the legs or lungs, death, injury to surrounding organs (intestine, liver, spleen, pancreas, lung, muscles, nerves), loss of sensation around incisions, decreased renal function, infection, injury to ureter, injury to bladder, and urethral strictures.  Additional procedures may be necessary in the perioperative period.  We discussed the risks of blood transfusion including HIV and hepatitis.   There are other additional unexpected complications which may occur.    After discussing the situation with the patient and her daughter, we will plan for observation.  Likely CT in 1 year.        Incontinence  Plan for referral to Female Pelvic Medicine.      _______________________________________________________________________    CHIEF COMPLAINT  It was my pleasure to see Isabell Matthews who is a 60 year old female for evaluation of renal mass    HPI   One month ago she was hospitalized for DKA.  As part of that hospitalization she was found to have a left renal mass as well as kidney stones.  She also was noted to have incontinence,    She has a history of vascular dementia and stroke.      About 18 months ago she saw a urologist in [facility].  The report at that time they felt the kidney mass was not likely to be cancer.    In terms of incontinence. It is worsening over the last 3 months.  She had to start diapers 5 months ago and now has every morning incontinence.  She is doing double voiding and kegels.  She uses 2-5 diapers per day.  It has improved with FSBG control.      She has history of 3 vaginal deliveries.    Presenting symptom: Incidental.  Denies hematuria, weight loss, or bone pain.  Hereditary syndromes: None  ECOG Performance Score: 3  0=Fully active, 1=Unable to do  strenuous activity but capable of light work, 2=Ambulatory and capable of all selfcare, 3=Capable of limited selfcare, confined to bed >50% of waking hours, 4=Completely disabled.    RADIOLOGIC IMAGING  I reviewed the recent radiologic imaging and reports described below.  FRANCOIS Hudson Anselmo    PHYSICAL EXAM  Vitals:    10/30/20 1119   BP: 134/73   Pulse: 89     Wt Readings from Last 3 Encounters:   09/30/20 78.5 kg (173 lb 1.6 oz)     Constitutional: Alert, no acute distress  Psychiatric: Normal mood and affect  Head: Normocephalic.  Neck: Neck supple. No adenopathy. Thyroid symmetric, normal size  Back: No spinal tenderness.  No costovertebral angle tenderness  Cardiovascular: RRR.  Respiratory: Good diaphragmatic excursion.  Gastrointestinal: Abdomen soft, non-tender. No masses, organomegaly. No hernia  Skin: no suspicious lesions or rashes on abdomen  Extremities: No lower extremity edema.  No clubbing or cyanosis.  Neurologic:  Cranial nerves grossly intact.  Equal strength and sensation on bilateral extremities.   : Deferred    No family history on file.   Patient Active Problem List    Diagnosis Date Noted     Nephrolithiasis 10/22/2020     Priority: Medium     Left renal mass 10/22/2020     Priority: Medium     Mixed stress and urge urinary incontinence 10/22/2020     Priority: Medium     DKA (diabetic ketoacidoses) (H) 09/27/2020     Priority: Medium     Benign essential hypertension 09/02/2020     Priority: Medium     Acquired hypothyroidism 09/02/2020     Priority: Medium     Seizures (H) 09/02/2020     Priority: Medium     Hyperlipidemia LDL goal <100 09/02/2020     Priority: Medium     History of insulin dependent diabetes mellitus 09/02/2020     Priority: Medium     Vitamin D deficiency 09/02/2020     Priority: Medium     Vitamin B12 deficiency (non anemic) 09/02/2020     Priority: Medium     Other osteoarthritis of spine, unspecified spinal region 09/02/2020     Priority: Medium     Chest pressure  06/14/2016     Priority: Medium     Coronary atherosclerosis 06/14/2016     Priority: Medium     HTN (hypertension) 06/14/2016     Priority: Medium     Dehydration 03/09/2013     Priority: Medium     DM type 1 (diabetes mellitus, type 1) (H) 03/09/2013     Priority: Medium     Nausea & vomiting 03/09/2013     Priority: Medium     Otitis media of right ear 03/09/2013     Priority: Medium     Diabetic keto-acidosis (H) 03/09/2013     Priority: Medium     Past Medical History:   Diagnosis Date     Chronic pain      Essential hypertension      Ex-cigarette smoker     quit 7/2018 over 35 years     Hyperlipidemia      Hypothyroid      Insulin-requiring or dependent type II diabetes mellitus (H)      Seizures (H)      Stroke (H)      Vascular dementia (H)      Past Surgical History:   Procedure Laterality Date     athroplasty hip Bilateral     2003, 2006     Current Outpatient Medications   Medication Sig Dispense Refill     aspirin (ASA) 81 MG chewable tablet Take 1 tablet (81 mg) by mouth daily 100 tablet 3     atorvastatin (LIPITOR) 40 MG tablet Take 1 tablet (40 mg) by mouth daily 90 tablet 3     carvedilol (COREG) 3.125 MG tablet Take 1 tablet (3.125 mg) by mouth 2 times daily (with meals) 180 tablet 3     cyanocobalamin (VITAMIN B-12) 1000 MCG tablet Take 1 tablet (1,000 mcg) by mouth daily 100 tablet 3     hydrochlorothiazide (HYDRODIURIL) 25 MG tablet Take 1 tablet (25 mg) by mouth daily 90 tablet 3     insulin aspart (NOVOLOG PEN) 100 UNIT/ML pen Inject 0-10 Units Subcutaneous 4 times daily (with meals and nightly) Use with meals and at bedtime per sliding scale and carb correction 1 unit for 10 g carbs 12 mL 2     insulin glargine (LANTUS PEN) 100 UNIT/ML pen Inject 26 Units Subcutaneous At Bedtime 30 mL 11     insulin lispro (HUMALOG KWIKPEN) 100 UNIT/ML (1 unit dial) KWIKPEN INJ 4 UNITS WITH FOOD PLUS SLIDING SCALE. USE 24-48 UNITS PER DAY       insulin pen needle (31G X 8 MM) 31G X 8 MM miscellaneous Use 4 pen  needles daily or as directed. 300 each 4     levETIRAcetam (KEPPRA) 500 MG tablet Take 1 tablet (500 mg) by mouth 2 times daily 180 tablet 3     loratadine (CLEAR-ATADINE) 10 MG tablet Take 10 mg by mouth daily       Melatonin 10 MG TABS tablet Take 10 mg by mouth At Bedtime       NIFEdipine ER (ADALAT CC) 60 MG 24 hr tablet Take 1 tablet (60 mg) by mouth daily 90 tablet 3     pregabalin (LYRICA) 100 MG capsule Take 1 capsule (100 mg) by mouth 2 times daily 60 capsule 0     SYNTHROID 88 MCG tablet Take 1 tablet (88 mcg) by mouth daily 90 tablet 3     Vitamin D3 (CHOLECALCIFEROL) 25 mcg (1000 units) tablet Take 1 tablet (25 mcg) by mouth daily 100 tablet 3      Allergies   Allergen Reactions     Pollen Extract Headache     Social History     Occupational History     Not on file   Tobacco Use     Smoking status: Former Smoker     Packs/day: 0.50     Years: 35.00     Pack years: 17.50     Types: Cigarettes     Quit date: 2018     Years since quittin.3     Smokeless tobacco: Never Used   Substance and Sexual Activity     Alcohol use: Not Currently     Drug use: Not on file     Sexual activity: Not Currently       Recent Labs   Lab Test 20  0446 20  0424 20  0517 20  1609 20  1601   WBC  --  6.0 8.1  --  12.9*   HGB  --  10.2* 9.7* 12.6 11.0*   HCT  --  31.9* 31.4*  --  35.2    206 194  --  235     Recent Labs   Lab Test 10/01/20  0457 20  0446 20  1150 20  1024    140 141 141   POTASSIUM 3.5 3.8 3.7 3.7   CHLORIDE 108 109 112* 113*   CO2 28 24 20 20   ANIONGAP 6 8 8 8   * 264* 321* 288*   BUN 21 20 22 22   CR 1.58* 1.56* 1.80* 1.81*   GFRESTIMATED 35* 35* 30* 29*   GFRESTBLACK 40* 41* 34* 34*   VERONICA 9.5 9.2 8.5 8.7     Recent Labs   Lab Test 20  1906   COLOR Light Yellow   APPEARANCE Slightly Cloudy   URINEGLC >1000*   URINEBILI Negative   URINEKETONE 40*   SG 1.020   UBLD Negative   URINEPH 5.0   PROTEIN Negative   NITRITE Negative    LEUKEST Negative   RBCU <1   WBCU 1       Hudson BARRIENTOS, reviewed these laboratory values.    REVIEW OF SYSTEMS  The following systems were evaluated:   Constitutional, Eyes, Ears/Nose/Throat, Respiratory, Cardiovascular, Gastrointestinal, Genitourinary, Musculoskeletal, Skin/Integument, Neurologic, Psychiatric, Hematologic/Lymphatic, Allergic/Immunologic, Endocrine.  The only pertinent positives were as follows:  There are no additional symptoms other than noted in HPI     CC  Patient Care Team:  Bony Castaneda MD as PCP - General (Family Practice)  Carmen Driscoll MD as Resident (Student in organized health care education/training program)  Bony Castaneda MD as Assigned PCP  Tay Briones MD as MD (Urology)  Juju Esquivel, RN as Registered Nurse (Oncology)    cc: RANDI WILKS  [address] [place] [address] [address]  [place] [address]

## 2020-10-30 NOTE — PROGRESS NOTES
ASSESSMENT AND PLAN  3 cm left  renal mass that is poorly characterized with CT Abdomen/Pelvis without contrast and renal ultrasound.  This is complicated by creatinine of 1.6-1.8.    During counseling for this visit, we covered the risk of renal cell carcinoma and how this renal mass may be a non-cancerous lesion.  We covered options including observation, cryoablation, partial nephrectomy, and nephrectomy.  We covered different surgical approaches such as percutaneous, laparoscopic, robotic, and open surgery.  We covered the risk of observation which is metastatic spread and need for continued observation.  We covered surgical risks which include but are not limited to heart attack, stroke, blood clot in the legs or lungs, death, injury to surrounding organs (intestine, liver, spleen, pancreas, lung, muscles, nerves), hernias, loss of sensation around incisions, decreased renal function, and infection.  We discussed how blood transfusion may be necessary and the risks are viral illnesses such as HIV(AIDS) and Hepatitis.  Additional procedures may be necessary in the perioperative period.  If minimally invasive techniques are used it may be necessary to convert to open surgery, and if partial nephrectomy is attempted than full nephrectomy may be required.    At this point we need to better characterize the mass more thoroughly.  I will plan for MRI Abdomen With and Without Contrast.        Small right kidney stones.  During counseling for this visit, we covered the natural history of kidney stones, the risk of progression to symptomatic pain/infection, and the possibility of renal failure/kidney damage.  We covered treatment options including observation, ureteroscopy, extracorporeal shock wave lithotripsy (ESWL), and percutaneous nephrolithotomy (PCNL).  We covered surgical risks which include but are not limited to heart attack, stroke, blood clot in the legs or lungs, death, injury to surrounding organs  (intestine, liver, spleen, pancreas, lung, muscles, nerves), loss of sensation around incisions, decreased renal function, infection, injury to ureter, injury to bladder, and urethral strictures.  Additional procedures may be necessary in the perioperative period.  We discussed the risks of blood transfusion including HIV and hepatitis.   There are other additional unexpected complications which may occur.    After discussing the situation with the patient and her daughter, we will plan for observation.  Likely CT in 1 year.        Incontinence  Plan for referral to Female Pelvic Medicine.      _______________________________________________________________________    CHIEF COMPLAINT  It was my pleasure to see Isabell Matthews who is a 60 year old female for evaluation of renal mass    HPI   One month ago she was hospitalized for DKA.  As part of that hospitalization she was found to have a left renal mass as well as kidney stones.  She also was noted to have incontinence,    She has a history of vascular dementia and stroke.      About 18 months ago she saw a urologist in [facility].  The report at that time they felt the kidney mass was not likely to be cancer.    In terms of incontinence. It is worsening over the last 3 months.  She had to start diapers 5 months ago and now has every morning incontinence.  She is doing double voiding and kegels.  She uses 2-5 diapers per day.  It has improved with FSBG control.      She has history of 3 vaginal deliveries.    Presenting symptom: Incidental.  Denies hematuria, weight loss, or bone pain.  Hereditary syndromes: None  ECOG Performance Score: 3  0=Fully active, 1=Unable to do strenuous activity but capable of light work, 2=Ambulatory and capable of all selfcare, 3=Capable of limited selfcare, confined to bed >50% of waking hours, 4=Completely disabled.    RADIOLOGIC IMAGING  I reviewed the recent radiologic imaging and reports described below.  FRANCOIS Briones    PHYSICAL  EXAM  Vitals:    10/30/20 1119   BP: 134/73   Pulse: 89     Wt Readings from Last 3 Encounters:   09/30/20 78.5 kg (173 lb 1.6 oz)     Constitutional: Alert, no acute distress  Psychiatric: Normal mood and affect  Head: Normocephalic.  Neck: Neck supple. No adenopathy. Thyroid symmetric, normal size  Back: No spinal tenderness.  No costovertebral angle tenderness  Cardiovascular: RRR.  Respiratory: Good diaphragmatic excursion.  Gastrointestinal: Abdomen soft, non-tender. No masses, organomegaly. No hernia  Skin: no suspicious lesions or rashes on abdomen  Extremities: No lower extremity edema.  No clubbing or cyanosis.  Neurologic:  Cranial nerves grossly intact.  Equal strength and sensation on bilateral extremities.   : Deferred    No family history on file.   Patient Active Problem List    Diagnosis Date Noted     Nephrolithiasis 10/22/2020     Priority: Medium     Left renal mass 10/22/2020     Priority: Medium     Mixed stress and urge urinary incontinence 10/22/2020     Priority: Medium     DKA (diabetic ketoacidoses) (H) 09/27/2020     Priority: Medium     Benign essential hypertension 09/02/2020     Priority: Medium     Acquired hypothyroidism 09/02/2020     Priority: Medium     Seizures (H) 09/02/2020     Priority: Medium     Hyperlipidemia LDL goal <100 09/02/2020     Priority: Medium     History of insulin dependent diabetes mellitus 09/02/2020     Priority: Medium     Vitamin D deficiency 09/02/2020     Priority: Medium     Vitamin B12 deficiency (non anemic) 09/02/2020     Priority: Medium     Other osteoarthritis of spine, unspecified spinal region 09/02/2020     Priority: Medium     Chest pressure 06/14/2016     Priority: Medium     Coronary atherosclerosis 06/14/2016     Priority: Medium     HTN (hypertension) 06/14/2016     Priority: Medium     Dehydration 03/09/2013     Priority: Medium     DM type 1 (diabetes mellitus, type 1) (H) 03/09/2013     Priority: Medium     Nausea & vomiting 03/09/2013      Priority: Medium     Otitis media of right ear 03/09/2013     Priority: Medium     Diabetic keto-acidosis (H) 03/09/2013     Priority: Medium     Past Medical History:   Diagnosis Date     Chronic pain      Essential hypertension      Ex-cigarette smoker     quit 7/2018 over 35 years     Hyperlipidemia      Hypothyroid      Insulin-requiring or dependent type II diabetes mellitus (H)      Seizures (H)      Stroke (H)      Vascular dementia (H)      Past Surgical History:   Procedure Laterality Date     athroplasty hip Bilateral     2003, 2006     Current Outpatient Medications   Medication Sig Dispense Refill     aspirin (ASA) 81 MG chewable tablet Take 1 tablet (81 mg) by mouth daily 100 tablet 3     atorvastatin (LIPITOR) 40 MG tablet Take 1 tablet (40 mg) by mouth daily 90 tablet 3     carvedilol (COREG) 3.125 MG tablet Take 1 tablet (3.125 mg) by mouth 2 times daily (with meals) 180 tablet 3     cyanocobalamin (VITAMIN B-12) 1000 MCG tablet Take 1 tablet (1,000 mcg) by mouth daily 100 tablet 3     hydrochlorothiazide (HYDRODIURIL) 25 MG tablet Take 1 tablet (25 mg) by mouth daily 90 tablet 3     insulin aspart (NOVOLOG PEN) 100 UNIT/ML pen Inject 0-10 Units Subcutaneous 4 times daily (with meals and nightly) Use with meals and at bedtime per sliding scale and carb correction 1 unit for 10 g carbs 12 mL 2     insulin glargine (LANTUS PEN) 100 UNIT/ML pen Inject 26 Units Subcutaneous At Bedtime 30 mL 11     insulin lispro (HUMALOG KWIKPEN) 100 UNIT/ML (1 unit dial) KWIKPEN INJ 4 UNITS WITH FOOD PLUS SLIDING SCALE. USE 24-48 UNITS PER DAY       insulin pen needle (31G X 8 MM) 31G X 8 MM miscellaneous Use 4 pen needles daily or as directed. 300 each 4     levETIRAcetam (KEPPRA) 500 MG tablet Take 1 tablet (500 mg) by mouth 2 times daily 180 tablet 3     loratadine (CLEAR-ATADINE) 10 MG tablet Take 10 mg by mouth daily       Melatonin 10 MG TABS tablet Take 10 mg by mouth At Bedtime       NIFEdipine ER (ADALAT  CC) 60 MG 24 hr tablet Take 1 tablet (60 mg) by mouth daily 90 tablet 3     pregabalin (LYRICA) 100 MG capsule Take 1 capsule (100 mg) by mouth 2 times daily 60 capsule 0     SYNTHROID 88 MCG tablet Take 1 tablet (88 mcg) by mouth daily 90 tablet 3     Vitamin D3 (CHOLECALCIFEROL) 25 mcg (1000 units) tablet Take 1 tablet (25 mcg) by mouth daily 100 tablet 3      Allergies   Allergen Reactions     Pollen Extract Headache     Social History     Occupational History     Not on file   Tobacco Use     Smoking status: Former Smoker     Packs/day: 0.50     Years: 35.00     Pack years: 17.50     Types: Cigarettes     Quit date: 2018     Years since quittin.3     Smokeless tobacco: Never Used   Substance and Sexual Activity     Alcohol use: Not Currently     Drug use: Not on file     Sexual activity: Not Currently       Recent Labs   Lab Test 20  0446 20  0424 20  0517 20  1609 20  1601   WBC  --  6.0 8.1  --  12.9*   HGB  --  10.2* 9.7* 12.6 11.0*   HCT  --  31.9* 31.4*  --  35.2    206 194  --  235     Recent Labs   Lab Test 10/01/20  0457 20  0446 20  1150 20  1024    140 141 141   POTASSIUM 3.5 3.8 3.7 3.7   CHLORIDE 108 109 112* 113*   CO2 28 24 20 20   ANIONGAP 6 8 8 8   * 264* 321* 288*   BUN 21 20 22 22   CR 1.58* 1.56* 1.80* 1.81*   GFRESTIMATED 35* 35* 30* 29*   GFRESTBLACK 40* 41* 34* 34*   VERONICA 9.5 9.2 8.5 8.7     Recent Labs   Lab Test 20  1906   COLOR Light Yellow   APPEARANCE Slightly Cloudy   URINEGLC >1000*   URINEBILI Negative   URINEKETONE 40*   SG 1.020   UBLD Negative   URINEPH 5.0   PROTEIN Negative   NITRITE Negative   LEUKEST Negative   RBCU <1   WBCU 1       Hudson BARRIENTOS, reviewed these laboratory values.    REVIEW OF SYSTEMS  The following systems were evaluated:   Constitutional, Eyes, Ears/Nose/Throat, Respiratory, Cardiovascular, Gastrointestinal, Genitourinary, Musculoskeletal, Skin/Integument, Neurologic,  Psychiatric, Hematologic/Lymphatic, Allergic/Immunologic, Endocrine.  The only pertinent positives were as follows:  There are no additional symptoms other than noted in HPI     CC  Patient Care Team:  Bony Castaneda MD as PCP - General (Family Practice)  Carmen Driscoll MD as Resident (Student in organized health care education/training program)  Bony Castaneda MD as Assigned PCP  Tay Briones MD as MD (Urology)  Juju Esquivel, RN as Registered Nurse (Oncology)  SELF, REFERRED    Copy to patient  RANDI WILKS  [address]  [place] [address]

## 2020-10-30 NOTE — TELEPHONE ENCOUNTER
Reason for Visit: Consult    Diagnosis: incontinence    Orders/Procedures/Records: in system    Contact Patient: n/a    Rooming Requirements: ha Rodrigues LPN  10/30/20  1:40 PM

## 2020-11-01 NOTE — PROGRESS NOTES
Isabell Gagnon is here with her her daughter Maria Guadalupe for follow up of chronic health conditions.They arrived over 25 minutes late for the visit due to the complexities of trying to get her to the visit Concerns are as follows:    She has a complicated past medical history which includes diabetes insulin requiring with history of ketoacidosis, renal impairment, hypothyroidism, coronary artery disease with MI, hyperlipidemia, hypertension, seizure disorder, vascular dementia degernative arthritis and left  renal mass.  She recently moved to the [facility] now living in [place].  Her daughter Maria Guadalupe is providing most of her cares and serving as her PCA. She was hospitalized and now is receiving home care through [facility].  Diabetes  Taking her medications. and glucose in better range.Goal is to try to increase physical activity and muscle strengthening. Foot exam today requires eye and dental referrals  Joint pains  She also has significant joint pains in the shoulders cervical spine and knees home and completed x-rays. No significant knee or shoulder abnormality.SHe has mild degenerative changes of cervical spine  Renal  I reviewed CT scan showed left renal mass.  Her daughter indicates there was a biopsy of the mass previously and she will try to obtain records.  I also discussed nephrolithiasis in both kidneys which may be contributing to some of her pain.  Urinary incontinence has increased after slight improvement immediately post discharge She completed visit 10/30/20 urology Dr Tay Briones with plans to obtain MRI of renal mass and referral for therapy for stress incontinence.  Incontinence is a major concern.SHe has incontinence garments.  Hypertension  Visit today to confirm blood pressure readings  Forms  They request completion of disability parking hang tag    Patient Active Problem List   Diagnosis     Benign essential hypertension     Acquired hypothyroidism     Seizures (H)     Hyperlipidemia LDL  goal <100     History of insulin dependent diabetes mellitus     Vitamin D deficiency     Vitamin B12 deficiency (non anemic)     Other osteoarthritis of spine, unspecified spinal region     DKA (diabetic ketoacidoses) (H)     Nephrolithiasis     Left renal mass     Mixed stress and urge urinary incontinence     Chest pressure     Coronary atherosclerosis     Dehydration     DM type 1 (diabetes mellitus, type 1) (H)     HTN (hypertension)     Nausea & vomiting     Otitis media of right ear     Diabetic keto-acidosis (H)     Ischemic vascular dementia (H)     Fungal dermatitis     Nail deformity       Past Medical History:   Diagnosis Date     Chronic pain      Essential hypertension      Ex-cigarette smoker     quit 7/2018 over 35 years     Hyperlipidemia      Hypothyroid      Insulin-requiring or dependent type II diabetes mellitus (H)      Seizures (H)      Stroke (H)      Vascular dementia (H)        Past Surgical History:   Procedure Laterality Date     athroplasty hip Bilateral     2003, 2006       Current Outpatient Medications   Medication Sig Dispense Refill     aspirin (ASA) 81 MG chewable tablet Take 1 tablet (81 mg) by mouth daily 100 tablet 3     atorvastatin (LIPITOR) 40 MG tablet Take 1 tablet (40 mg) by mouth daily 90 tablet 3     blood glucose (NO BRAND SPECIFIED) test strip Use to test blood sugar 4-5 times daily or as directed. 400 each 6     carvedilol (COREG) 3.125 MG tablet Take 1 tablet (3.125 mg) by mouth 2 times daily (with meals) 180 tablet 3     clotrimazole (LOTRIMIN) 1 % external cream Apply topically 2 times daily Until rash resolved 60 g 3     cyanocobalamin (VITAMIN B-12) 1000 MCG tablet Take 1 tablet (1,000 mcg) by mouth daily 100 tablet 3     DULoxetine (CYMBALTA) 20 MG capsule Take 1 capsule (20 mg) by mouth daily 90 capsule 3     hydrochlorothiazide (HYDRODIURIL) 25 MG tablet Take 1 tablet (25 mg) by mouth daily 90 tablet 3     insulin aspart (NOVOLOG PEN) 100 UNIT/ML pen Inject  0-10 Units Subcutaneous 4 times daily (with meals and nightly) Use with meals and at bedtime per sliding scale and carb correction 1 unit for 10 g carbs 12 mL 2     insulin glargine (LANTUS PEN) 100 UNIT/ML pen Inject 26 Units Subcutaneous At Bedtime 30 mL 11     insulin lispro (HUMALOG KWIKPEN) 100 UNIT/ML (1 unit dial) KWIKPEN INJ 4 UNITS WITH FOOD PLUS SLIDING SCALE. USE 24-48 UNITS PER DAY       insulin pen needle (31G X 8 MM) 31G X 8 MM miscellaneous Use 4 pen needles daily or as directed. 300 each 4     levETIRAcetam (KEPPRA) 500 MG tablet Take 1 tablet (500 mg) by mouth 2 times daily 180 tablet 3     loratadine (CLEAR-ATADINE) 10 MG tablet Take 10 mg by mouth daily       Melatonin 10 MG TABS tablet Take 10 mg by mouth At Bedtime       NIFEdipine ER (ADALAT CC) 60 MG 24 hr tablet Take 1 tablet (60 mg) by mouth daily 90 tablet 3     pregabalin (LYRICA) 100 MG capsule Take 1 capsule (100 mg) by mouth 2 times daily 60 capsule 0     SYNTHROID 88 MCG tablet Take 1 tablet (88 mcg) by mouth daily 90 tablet 3     Vitamin D3 (CHOLECALCIFEROL) 25 mcg (1000 units) tablet Take 1 tablet (25 mcg) by mouth daily 100 tablet 3       Immunization History   Administered Date(s) Administered     Influenza Vaccine IM > 6 months Valent IIV4 11/02/2020       Allergies   Allergen Reactions     Pollen Extract Headache       Social History     Socioeconomic History     Marital status:      Spouse name: Not on file     Number of children: Not on file     Years of education: Not on file     Highest education level: Not on file   Occupational History     Not on file   Social Needs     Financial resource strain: Not on file     Food insecurity     Worry: Not on file     Inability: Not on file     Transportation needs     Medical: Not on file     Non-medical: Not on file   Tobacco Use     Smoking status: Former Smoker     Packs/day: 0.50     Years: 35.00     Pack years: 17.50     Types: Cigarettes     Quit date: 7/1/2018     Years  since quittin.3     Smokeless tobacco: Never Used   Substance and Sexual Activity     Alcohol use: Not Currently     Drug use: Not on file     Sexual activity: Not Currently   Lifestyle     Physical activity     Days per week: Not on file     Minutes per session: Not on file     Stress: Not on file   Relationships     Social connections     Talks on phone: Not on file     Gets together: Not on file     Attends Mosque service: Not on file     Active member of club or organization: Not on file     Attends meetings of clubs or organizations: Not on file     Relationship status: Not on file     Intimate partner violence     Fear of current or ex partner: Not on file     Emotionally abused: Not on file     Physically abused: Not on file     Forced sexual activity: Not on file   Other Topics Concern     Not on file   Social History Narrative    Recently moved to [facility] with Daughter Charli who is her pca       History reviewed. No pertinent family history.    Remaining 10 point ROS of systems including Constitutional, Eyes, Respiratory, Cardiovascular, Gastroenterology, Genitourinary, Integumentary, Musculoskeletal, Neurological, Psychiatric were all negative except for pertinent positives noted in HPI and ROS reviewed above.  BP (!) 142/85   Pulse 90   Wt 78.9 kg (174 lb)   SpO2 95%   Breastfeeding No   BMI 32.89 kg/m    Constitutional: Sitting in a wheelchair alert but appears and chronic poor health, well-groomed, cooperative allows her daughter to provide most of the history.  HENT: TM normal.  Right pinna pedunculated growth she indicates has been present for years without change.  Head: Normocephalic and atraumatic.   Mouth/Throat: Wearing mask  Eyes: Conjunctivae and EOM are normal. Pupils are equal, round, and reactive to light. No scleral icterus.   Neck:  Neck supple. No JVD present. No tracheal deviation present. No thyromegaly present.   Cardiovascular: Regular rhythm, normal heart sounds and  intact distal pulses. No murmur present. Exam reveals no gallop and no friction rub.   Pulmonary/Chest: Effort normal and breath sounds normal. No respiratory distress.   Abdominal: Exam in chair soft obese. Bowel sounds are normal. No distension and no mass. No tenderness.   Musculoskeletal: Deconditioned upper and lower extremities.  Slow range of motion of shoulders due to muscle deconditioning. No edema and no tenderness.   Lymphadenopathy:   No cervical adenopathy.   Neurological: Alert and cooperative.  Signs of vascular dementia  No cranial nerve deficit or sensory deficit. DTR s normal  Skin: She has a rash in the intertriginous folds of the groin .  Psychiatric: Depressed affect.  Foot exam monofilament intact slightly thickened nails no foot ulcers very dry skin plantar surface of feet.  PHQ 11/2/2020   PHQ-9 Total Score 19   Q9: Thoughts of better off dead/self-harm past 2 weeks Not at all     DEMETRIA-7 SCORE 11/2/2020   Total Score 10     Isabell Matthews past medical history which includes diabetes insulin requiring with history of ketoacidosis, renal impairment, hypothyroidism, coronary artery disease with MI, hyperlipidemia, hypertension, seizure disorder, vascular dementia degernative arthritis, left  renal mass presents with her daughter for first in clinic visit after establishing primary care recent move to [place]. He daughter is trying to coordinate services through the county including SNAP, PCA and additional  Services as she requires full cares. She has significant depression  and symptoms of fibromyalgia will start Cymbalta. Today I completed handicapped parking hangtag, patient does not drive but her daughter requires ability for handicapped parking when Isabell is transported, provided form to [facility] to finish form. She requires several referrals EYE, dental and podiatry for foot care. Recommended moisturizer to feet.  Isabell was seen today for recheck medication.    Diagnoses and all orders for this  visit:    Type 1 diabetes mellitus with other circulatory complication (H)  -     blood glucose (NO BRAND SPECIFIED) test strip; Use to test blood sugar 4-5 times daily or as directed.  -     EYE ADULT REFERRAL; Future    Ischemic vascular dementia (H)    Need for vaccination  -     FLU VAC PRESRV FREE QUAD SPLIT VIR 3+YRS IM    Acquired hypothyroidism    Benign essential hypertension    Left renal mass  Recent visit with urology plan reviewed  Fibromyalgia  -     DULoxetine (CYMBALTA) 20 MG capsule; Take 1 capsule (20 mg) by mouth daily    Fungal dermatitis  Groin  -     clotrimazole (LOTRIMIN) 1 % external cream; Apply topically 2 times daily Until rash resolved    Nail deformity  -     Orthopedic & Spine  Referral; Future    Need for dental care  -     DENTAL REFERRAL; Future  Due for several health maintaince procedures such as mammogram however they elected to defer for now considering her frailty and approaching winter. Need to obtain immunization records  Follow-up early December may be virtual to ensure coordination of cares.      Bony Castaneda MD

## 2020-11-02 ENCOUNTER — OFFICE VISIT (OUTPATIENT)
Dept: FAMILY MEDICINE | Facility: CLINIC | Age: 60
End: 2020-11-02
Payer: MEDICARE

## 2020-11-02 ENCOUNTER — MEDICAL CORRESPONDENCE (OUTPATIENT)
Dept: HEALTH INFORMATION MANAGEMENT | Facility: CLINIC | Age: 60
End: 2020-11-02

## 2020-11-02 VITALS
SYSTOLIC BLOOD PRESSURE: 142 MMHG | HEART RATE: 90 BPM | BODY MASS INDEX: 32.89 KG/M2 | WEIGHT: 174 LBS | DIASTOLIC BLOOD PRESSURE: 85 MMHG | OXYGEN SATURATION: 95 %

## 2020-11-02 DIAGNOSIS — F01.50: ICD-10-CM

## 2020-11-02 DIAGNOSIS — B36.9 FUNGAL DERMATITIS: ICD-10-CM

## 2020-11-02 DIAGNOSIS — L60.8 NAIL DEFORMITY: ICD-10-CM

## 2020-11-02 DIAGNOSIS — Z91.89 NEED FOR DENTAL CARE: ICD-10-CM

## 2020-11-02 DIAGNOSIS — M79.7 FIBROMYALGIA: ICD-10-CM

## 2020-11-02 DIAGNOSIS — I10 BENIGN ESSENTIAL HYPERTENSION: ICD-10-CM

## 2020-11-02 DIAGNOSIS — N28.89 LEFT RENAL MASS: ICD-10-CM

## 2020-11-02 DIAGNOSIS — Z23 NEED FOR VACCINATION: ICD-10-CM

## 2020-11-02 DIAGNOSIS — E03.9 ACQUIRED HYPOTHYROIDISM: ICD-10-CM

## 2020-11-02 DIAGNOSIS — E10.59 TYPE 1 DIABETES MELLITUS WITH OTHER CIRCULATORY COMPLICATION (H): Primary | ICD-10-CM

## 2020-11-02 PROCEDURE — G0008 ADMIN INFLUENZA VIRUS VAC: HCPCS | Performed by: FAMILY MEDICINE

## 2020-11-02 PROCEDURE — G0180 MD CERTIFICATION HHA PATIENT: HCPCS | Performed by: FAMILY MEDICINE

## 2020-11-02 PROCEDURE — 99215 OFFICE O/P EST HI 40 MIN: CPT | Mod: 25 | Performed by: FAMILY MEDICINE

## 2020-11-02 PROCEDURE — 90686 IIV4 VACC NO PRSV 0.5 ML IM: CPT | Performed by: FAMILY MEDICINE

## 2020-11-02 RX ORDER — CLOTRIMAZOLE 1 %
CREAM (GRAM) TOPICAL 2 TIMES DAILY
Qty: 60 G | Refills: 3 | Status: ON HOLD | OUTPATIENT
Start: 2020-11-02 | End: 2022-05-04

## 2020-11-02 RX ORDER — DULOXETIN HYDROCHLORIDE 20 MG/1
20 CAPSULE, DELAYED RELEASE ORAL DAILY
Qty: 90 CAPSULE | Refills: 3 | Status: SHIPPED | OUTPATIENT
Start: 2020-11-02 | End: 2021-09-07

## 2020-11-02 ASSESSMENT — ANXIETY QUESTIONNAIRES
6. BECOMING EASILY ANNOYED OR IRRITABLE: SEVERAL DAYS
1. FEELING NERVOUS, ANXIOUS, OR ON EDGE: MORE THAN HALF THE DAYS
7. FEELING AFRAID AS IF SOMETHING AWFUL MIGHT HAPPEN: NEARLY EVERY DAY
3. WORRYING TOO MUCH ABOUT DIFFERENT THINGS: SEVERAL DAYS
GAD7 TOTAL SCORE: 10
IF YOU CHECKED OFF ANY PROBLEMS ON THIS QUESTIONNAIRE, HOW DIFFICULT HAVE THESE PROBLEMS MADE IT FOR YOU TO DO YOUR WORK, TAKE CARE OF THINGS AT HOME, OR GET ALONG WITH OTHER PEOPLE: SOMEWHAT DIFFICULT
2. NOT BEING ABLE TO STOP OR CONTROL WORRYING: MORE THAN HALF THE DAYS
5. BEING SO RESTLESS THAT IT IS HARD TO SIT STILL: NOT AT ALL

## 2020-11-02 ASSESSMENT — PATIENT HEALTH QUESTIONNAIRE - PHQ9
SUM OF ALL RESPONSES TO PHQ QUESTIONS 1-9: 19
5. POOR APPETITE OR OVEREATING: SEVERAL DAYS

## 2020-11-02 NOTE — NURSING NOTE
Chief Complaint   Patient presents with     Recheck Medication     general follow up     Kimberly Nissen, EMT at 12:03 PM on 11/2/2020

## 2020-11-03 ENCOUNTER — PRE VISIT (OUTPATIENT)
Dept: NEUROLOGY | Facility: CLINIC | Age: 60
End: 2020-11-03

## 2020-11-03 ASSESSMENT — ANXIETY QUESTIONNAIRES: GAD7 TOTAL SCORE: 10

## 2020-11-03 NOTE — TELEPHONE ENCOUNTER
FUTURE VISIT INFORMATION      FUTURE VISIT INFORMATION:    Date: 11/6/2020    Time: 315pm    [place] [facility]  REFERRAL INFORMATION:    Referring provider:  Dr. Curry     Referring providers clinic:  [facility] ED     Reason for visit/diagnosis  Dementia/Stroke     RECORDS REQUESTED FROM:       Clinic name Comments Records Status Imaging Status   Internal ED/Admission 9/27/2020    CT Head 9/27/2020 [facility] CT Head 8/21/2018 Care Everywhere Requested to PACS                       11/6/2020-[facility] Images now in PACS-MR @ 458am

## 2020-11-04 ENCOUNTER — TELEPHONE (OUTPATIENT)
Dept: FAMILY MEDICINE | Facility: CLINIC | Age: 60
End: 2020-11-04

## 2020-11-04 NOTE — TELEPHONE ENCOUNTER
Verbal orders given to Mian per Dr. Castaneda for    ORDER:  PT 1w3 for LE/core strengthening, balance training, caregiver training, exercise recommendations, and HEP in order to reduce risk for falling and improve steadiness of gait      Edwar Andrews CMA (AAMA) at 3:08 PM on 11/4/2020

## 2020-11-04 NOTE — TELEPHONE ENCOUNTER
[facility] now requests orders and shares plan of care/discharge summaries for some patients through [employer]. Please REPLY TO THIS MESSAGE OR ROUTE BACK TO THE AUTHOR in order to give authorization for orders when needed. This is considered a verbal order, you will still receive a faxed copy of orders for signature.  Thank you for your assistance in improving collaboration for our patients.    ORDER:  PT 1w3 for LE/core strengthening, balance training, caregiver training, exercise recommendations, and HEP in order to reduce risk for falling and improve steadiness of gait.    Mian oPst, PT, DPT  [email or web address]  [phone]

## 2020-11-05 NOTE — PROGRESS NOTES
"[facility] Neurology Consultation    Isabell Matthews MRN# [id number]   Age: 60 year old YOB: 1960     Requesting physician: Bony Quiñones     Reason for Consultation: seizures      History of Presenting Symptoms:   Isabell Matthews is a 60 year old female who presents today for evaluation of seizures and memory problems.     In spring of 2018 patient was confused and brought to the hospital. Imaging showed acute to subacute lucanar infarction as well as chronic infarctions. This is around the time problems with the memory started. She was hospitalized multiple times over the next 6 months, twice for diabetes (DKA), and then once for seizures. She was also hospitalized for a \"mini stroke or seizure\". For the first 6 months after the stroke, patient had problems with \"sundowning\", getting more confused at night. Family was told by previous neurologist that patient has vascular dementia. Patient has been living in [place] for about the last year with daughter. She continues to have problems with the memory, but things aren't significantly worse. One of the biggest problems is she still thinks she is going home to [place]. Her long term memory is more preserved than the short memory.     Last seizure was about a 1.5 years ago. Patient had unilateral shaking followed by loss of consciousness. It is unclear how many seizures patient had, daughter thinks maybe 10 seizures. She has had no clear side effects with the keppra.     Currently her strength is pretty symmetric on both sides of the body. She has no unilateral weakness from the strokes. She is generally weak. She has been working with PT and OT since recent hospitalization for DKA. This has been helpful.     Over the last 4 months patient has had problems with incontinence.       Past Medical History:     Patient Active Problem List   Diagnosis     Benign essential hypertension     Acquired hypothyroidism     Seizures (H)     " Hyperlipidemia LDL goal <100     History of insulin dependent diabetes mellitus     Vitamin D deficiency     Vitamin B12 deficiency (non anemic)     Other osteoarthritis of spine, unspecified spinal region     DKA (diabetic ketoacidoses) (H)     Nephrolithiasis     Left renal mass     Mixed stress and urge urinary incontinence     Chest pressure     Coronary atherosclerosis     Dehydration     DM type 1 (diabetes mellitus, type 1) (H)     HTN (hypertension)     Nausea & vomiting     Otitis media of right ear     Diabetic keto-acidosis (H)     Ischemic vascular dementia (H)     Fungal dermatitis     Nail deformity     Past Medical History:   Diagnosis Date     Chronic pain      Essential hypertension      Ex-cigarette smoker     quit 2018 over 35 years     Hyperlipidemia      Hypothyroid      Insulin-requiring or dependent type II diabetes mellitus (H)      Seizures (H)      Stroke (H)      Vascular dementia (H)         Past Surgical History:     Past Surgical History:   Procedure Laterality Date     athroplasty hip Bilateral     ,      STENT          Social History:     Social History     Tobacco Use     Smoking status: Former Smoker     Packs/day: 0.50     Years: 35.00     Pack years: 17.50     Types: Cigarettes     Quit date: 2018     Years since quittin.3     Smokeless tobacco: Never Used   Substance Use Topics     Alcohol use: Not Currently     Drug use: Not Currently        Family History:     Family History   Problem Relation Age of Onset     Myocardial Infarction Father      Dementia Paternal Grandmother      Heart Disease Paternal Grandmother         Medications:     Current Outpatient Medications   Medication Sig     aspirin (ASA) 81 MG chewable tablet Take 1 tablet (81 mg) by mouth daily     atorvastatin (LIPITOR) 40 MG tablet Take 1 tablet (40 mg) by mouth daily     blood glucose (NO BRAND SPECIFIED) test strip Use to test blood sugar 4-5 times daily or as directed.     carvedilol  (COREG) 3.125 MG tablet Take 1 tablet (3.125 mg) by mouth 2 times daily (with meals)     clotrimazole (LOTRIMIN) 1 % external cream Apply topically 2 times daily Until rash resolved     cyanocobalamin (VITAMIN B-12) 1000 MCG tablet Take 1 tablet (1,000 mcg) by mouth daily     DULoxetine (CYMBALTA) 20 MG capsule Take 1 capsule (20 mg) by mouth daily     hydrochlorothiazide (HYDRODIURIL) 25 MG tablet Take 1 tablet (25 mg) by mouth daily     insulin aspart (NOVOLOG PEN) 100 UNIT/ML pen Inject 0-10 Units Subcutaneous 4 times daily (with meals and nightly) Use with meals and at bedtime per sliding scale and carb correction 1 unit for 10 g carbs     insulin glargine (LANTUS PEN) 100 UNIT/ML pen Inject 26 Units Subcutaneous At Bedtime     insulin lispro (HUMALOG KWIKPEN) 100 UNIT/ML (1 unit dial) KWIKPEN INJ 4 UNITS WITH FOOD PLUS SLIDING SCALE. USE 24-48 UNITS PER DAY     insulin pen needle (31G X 8 MM) 31G X 8 MM miscellaneous Use 4 pen needles daily or as directed.     levETIRAcetam (KEPPRA) 500 MG tablet Take 1 tablet (500 mg) by mouth 2 times daily     loratadine (CLEAR-ATADINE) 10 MG tablet Take 10 mg by mouth daily     Melatonin 10 MG TABS tablet Take 10 mg by mouth At Bedtime     NIFEdipine ER (ADALAT CC) 60 MG 24 hr tablet Take 1 tablet (60 mg) by mouth daily     pregabalin (LYRICA) 100 MG capsule Take 1 capsule (100 mg) by mouth 2 times daily     SYNTHROID 88 MCG tablet Take 1 tablet (88 mcg) by mouth daily     Vitamin D3 (CHOLECALCIFEROL) 25 mcg (1000 units) tablet Take 1 tablet (25 mcg) by mouth daily     No current facility-administered medications for this visit.         Allergies:     Allergies   Allergen Reactions     Pollen Extract Headache        Review of Systems:   A comprehensive 10 point review of systems (constitutional, ENT, cardiac, peripheral vascular, lymphatic, respiratory, GI, , Musculoskeletal, skin, Neurological) was performed and found to be negative except as described in this note.       Physical Exam:   Vitals: BP (!) 140/81   Pulse 90   Resp 16   Wt 78.9 kg (174 lb)   SpO2 95%   BMI 32.89 kg/m     General: Seated comfortably in no acute distress.  HEENT: Neck supple with normal range of motion. No paracervical muscle tenderness or tightness.  Optic discs sharp and vasculature normal on funduscopic exam.   Heart: Regular rate  Lungs: breathing comfortably  GI: Non tender  Extremities: no edema  Skin: No rashes  Neurologic:     Mental Status: Fully alert. Inattentive, but able to count backwards from 10 slowly. Oriented to name and place. Thought it was October and didn't know day of the week. Was oriented to year. Didn't remember that it was the election this week, but new Trump and Biden were running. On memory testing, 1/3 on delayed recall (3/3 with categories). Impaired fund of knowledge. Language normal, speech clear and fluent, no paraphasic errors. Naming intact.      Cranial Nerves: Slightly masked fasces. Visual fields intact. PERRL. EOMI with normal smooth pursuit. Facial sensation intact/symmetric. Facial movements symmetric. Hearing not formally tested but intact to conversation. Palate elevation symmetric, uvula midline. No dysarthria. Shoulder shrug strong bilaterally. Tongue protrusion midline.     Motor: No tremors or other abnormal movements observed. Muscle tone mildly increased throughout, possible paratonia. No pronator drift. Normal/symmetric rapid finger tapping. Strength 5/5 throughout upper and lower extremities.     Deep Tendon Reflexes: 2+/symmetric throughout upper and lower extremities with exception of 1+ ankle jerks. Toes downgoing bilaterally.     Sensory: Impaired temperature sensation in the feet, but otherwise intact/symmetric to light touch, temperature, vibration and proprioception throughout upper and lower extremities. Positive Romberg.      Coordination: Finger-nose-finger and heel-shin intact without dysmetria. Rapid alternating movements  intact/symmetric with normal speed and rhythm.     Gait: Slow and mildly wide based, but generally steady casual gait. Able to walk on toes, heels with assistance and moderate difficulty. Unable to tandem without full support.          Data: Pertinent prior to visit   Imaging/Laboratory:  CT brain 9/2020  Impression:  1.  No acute intracranial pathology.   2.  Mild to moderate diffuse cerebral atrophy and leukoaraiosis.  Personally reviewed         Procedures:  EEG 7/2018           Assessment and Plan:   Assessment:  Isabell Matthews is a 60 year old female who presents today for evaluation of seizures and memory problems, as well as history of stroke. In 2018 patient had multiple hospitalizations for confusion, seizures. On at least one admission acute stroke was found (right basal ganglia / frontal corona radiata / right lentiform nucleus). Imaging also with evidence of multiple chronic lacunar infarctions and chronic microvascular ischemic changes. In addition patient with multiple hospitalizations for DKA. Seizures described as unilateral shaking and loss of consciousness, unclear if in the setting of DKA. Patient has been living in [place] for about the last year and had been doing better from a medical standpoint. However she was hospitalized recently in 9/2020 for recurrent DKA. Patient referred to neurology following hospitalization for management of neurological issues.     On brief cognitive testing patient with deficits in attention, orientation, memory, fund of knowledge. Discussed with daughter that given history, agree with previous assessments that memory problems are likely related to cerebral vascular damage. We discussed that in most cases of vascular cognitive impairment, deficits are static and non progressive if vascular risk factors are controlled. We discussed pursuing neuropsychological testing to better characterize and get baseline assessment for cognitive deficits. Patient should continue  aspirin for stroke prevention.     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 She has not had a seizure in at least 1.5 years and appears to be tolerating Keppra. Patient had normal EEG in 7/2018.     Plan:  - Neuropsychological testing  - Aspirin 81 mg  - Keppra 500 mg BID    Follow up in Neurology clinic in 6 months or earlier as needed should new concerns arise.    Marshall Medina MD   of Neurology  [facility]

## 2020-11-06 ENCOUNTER — OFFICE VISIT (OUTPATIENT)
Dept: NEUROLOGY | Facility: CLINIC | Age: 60
End: 2020-11-06
Payer: MEDICARE

## 2020-11-06 VITALS
WEIGHT: 174 LBS | RESPIRATION RATE: 16 BRPM | DIASTOLIC BLOOD PRESSURE: 81 MMHG | HEART RATE: 90 BPM | SYSTOLIC BLOOD PRESSURE: 140 MMHG | BODY MASS INDEX: 32.89 KG/M2 | OXYGEN SATURATION: 95 %

## 2020-11-06 DIAGNOSIS — F01.50: ICD-10-CM

## 2020-11-06 DIAGNOSIS — R41.3 MEMORY CHANGE: Primary | ICD-10-CM

## 2020-11-06 PROCEDURE — 99205 OFFICE O/P NEW HI 60 MIN: CPT | Performed by: INTERNAL MEDICINE

## 2020-11-06 ASSESSMENT — PAIN SCALES - GENERAL: PAINLEVEL: SEVERE PAIN (6)

## 2020-11-06 NOTE — LETTER
"11/6/2020       RE: Isabell Matthews  [address]  [place] [address]     Dear Colleague,    Thank you for referring your patient, Isabell Matthews, to the [facility] NEUROLOGY CLINIC [place] at [facility]. Please see a copy of my visit note below.    [facility] Neurology Consultation    Isabell Matthews MRN# [id number]   Age: 60 year old YOB: 1960     Requesting physician: Bony Quiñones     Reason for Consultation: seizures      History of Presenting Symptoms:   Isabell Matthews is a 60 year old female who presents today for evaluation of seizures and memory problems.     In spring of 2018 patient was confused and brought to the hospital. Imaging showed acute to subacute lucanar infarction as well as chronic infarctions. This is around the time problems with the memory started. She was hospitalized multiple times over the next 6 months, twice for diabetes (DKA), and then once for seizures. She was also hospitalized for a \"mini stroke or seizure\". For the first 6 months after the stroke, patient had problems with \"sundowning\", getting more confused at night. Family was told by previous neurologist that patient has vascular dementia. Patient has been living in [place] for about the last year with daughter. She continues to have problems with the memory, but things aren't significantly worse. One of the biggest problems is she still thinks she is going home to [place]. Her long term memory is more preserved than the short memory.     Last seizure was about a 1.5 years ago. Patient had unilateral shaking followed by loss of consciousness. It is unclear how many seizures patient had, daughter thinks maybe 10 seizures. She has had no clear side effects with the keppra.     Currently her strength is pretty symmetric on both sides of the body. She has no unilateral weakness from the strokes. She is generally weak. She has been working " with PT and OT since recent hospitalization for DKA. This has been helpful.     Over the last 4 months patient has had problems with incontinence.       Past Medical History:     Patient Active Problem List   Diagnosis     Benign essential hypertension     Acquired hypothyroidism     Seizures (H)     Hyperlipidemia LDL goal <100     History of insulin dependent diabetes mellitus     Vitamin D deficiency     Vitamin B12 deficiency (non anemic)     Other osteoarthritis of spine, unspecified spinal region     DKA (diabetic ketoacidoses) (H)     Nephrolithiasis     Left renal mass     Mixed stress and urge urinary incontinence     Chest pressure     Coronary atherosclerosis     Dehydration     DM type 1 (diabetes mellitus, type 1) (H)     HTN (hypertension)     Nausea & vomiting     Otitis media of right ear     Diabetic keto-acidosis (H)     Ischemic vascular dementia (H)     Fungal dermatitis     Nail deformity     Past Medical History:   Diagnosis Date     Chronic pain      Essential hypertension      Ex-cigarette smoker     quit 2018 over 35 years     Hyperlipidemia      Hypothyroid      Insulin-requiring or dependent type II diabetes mellitus (H)      Seizures (H)      Stroke (H)      Vascular dementia (H)         Past Surgical History:     Past Surgical History:   Procedure Laterality Date     athroplasty hip Bilateral     ,      STENT          Social History:     Social History     Tobacco Use     Smoking status: Former Smoker     Packs/day: 0.50     Years: 35.00     Pack years: 17.50     Types: Cigarettes     Quit date: 2018     Years since quittin.3     Smokeless tobacco: Never Used   Substance Use Topics     Alcohol use: Not Currently     Drug use: Not Currently        Family History:     Family History   Problem Relation Age of Onset     Myocardial Infarction Father      Dementia Paternal Grandmother      Heart Disease Paternal Grandmother         Medications:     Current Outpatient  Medications   Medication Sig     aspirin (ASA) 81 MG chewable tablet Take 1 tablet (81 mg) by mouth daily     atorvastatin (LIPITOR) 40 MG tablet Take 1 tablet (40 mg) by mouth daily     blood glucose (NO BRAND SPECIFIED) test strip Use to test blood sugar 4-5 times daily or as directed.     carvedilol (COREG) 3.125 MG tablet Take 1 tablet (3.125 mg) by mouth 2 times daily (with meals)     clotrimazole (LOTRIMIN) 1 % external cream Apply topically 2 times daily Until rash resolved     cyanocobalamin (VITAMIN B-12) 1000 MCG tablet Take 1 tablet (1,000 mcg) by mouth daily     DULoxetine (CYMBALTA) 20 MG capsule Take 1 capsule (20 mg) by mouth daily     hydrochlorothiazide (HYDRODIURIL) 25 MG tablet Take 1 tablet (25 mg) by mouth daily     insulin aspart (NOVOLOG PEN) 100 UNIT/ML pen Inject 0-10 Units Subcutaneous 4 times daily (with meals and nightly) Use with meals and at bedtime per sliding scale and carb correction 1 unit for 10 g carbs     insulin glargine (LANTUS PEN) 100 UNIT/ML pen Inject 26 Units Subcutaneous At Bedtime     insulin lispro (HUMALOG KWIKPEN) 100 UNIT/ML (1 unit dial) KWIKPEN INJ 4 UNITS WITH FOOD PLUS SLIDING SCALE. USE 24-48 UNITS PER DAY     insulin pen needle (31G X 8 MM) 31G X 8 MM miscellaneous Use 4 pen needles daily or as directed.     levETIRAcetam (KEPPRA) 500 MG tablet Take 1 tablet (500 mg) by mouth 2 times daily     loratadine (CLEAR-ATADINE) 10 MG tablet Take 10 mg by mouth daily     Melatonin 10 MG TABS tablet Take 10 mg by mouth At Bedtime     NIFEdipine ER (ADALAT CC) 60 MG 24 hr tablet Take 1 tablet (60 mg) by mouth daily     pregabalin (LYRICA) 100 MG capsule Take 1 capsule (100 mg) by mouth 2 times daily     SYNTHROID 88 MCG tablet Take 1 tablet (88 mcg) by mouth daily     Vitamin D3 (CHOLECALCIFEROL) 25 mcg (1000 units) tablet Take 1 tablet (25 mcg) by mouth daily     No current facility-administered medications for this visit.         Allergies:     Allergies   Allergen  Reactions     Pollen Extract Headache        Review of Systems:   A comprehensive 10 point review of systems (constitutional, ENT, cardiac, peripheral vascular, lymphatic, respiratory, GI, , Musculoskeletal, skin, Neurological) was performed and found to be negative except as described in this note.      Physical Exam:   Vitals: BP (!) 140/81   Pulse 90   Resp 16   Wt 78.9 kg (174 lb)   SpO2 95%   BMI 32.89 kg/m     General: Seated comfortably in no acute distress.  HEENT: Neck supple with normal range of motion. No paracervical muscle tenderness or tightness.  Optic discs sharp and vasculature normal on funduscopic exam.   Heart: Regular rate  Lungs: breathing comfortably  GI: Non tender  Extremities: no edema  Skin: No rashes  Neurologic:     Mental Status: Fully alert. Inattentive, but able to count backwards from 10 slowly. Oriented to name and place. Thought it was October and didn't know day of the week. Was oriented to year. Didn't remember that it was the election this week, but new Trump and Biden were running. On memory testing, 1/3 on delayed recall (3/3 with categories). Impaired fund of knowledge. Language normal, speech clear and fluent, no paraphasic errors. Naming intact.      Cranial Nerves: Slightly masked fasces. Visual fields intact. PERRL. EOMI with normal smooth pursuit. Facial sensation intact/symmetric. Facial movements symmetric. Hearing not formally tested but intact to conversation. Palate elevation symmetric, uvula midline. No dysarthria. Shoulder shrug strong bilaterally. Tongue protrusion midline.     Motor: No tremors or other abnormal movements observed. Muscle tone mildly increased throughout, possible paratonia. No pronator drift. Normal/symmetric rapid finger tapping. Strength 5/5 throughout upper and lower extremities.     Deep Tendon Reflexes: 2+/symmetric throughout upper and lower extremities with exception of 1+ ankle jerks. Toes downgoing bilaterally.     Sensory:  Impaired temperature sensation in the feet, but otherwise intact/symmetric to light touch, temperature, vibration and proprioception throughout upper and lower extremities. Positive Romberg.      Coordination: Finger-nose-finger and heel-shin intact without dysmetria. Rapid alternating movements intact/symmetric with normal speed and rhythm.     Gait: Slow and mildly wide based, but generally steady casual gait. Able to walk on toes, heels with assistance and moderate difficulty. Unable to tandem without full support.          Data: Pertinent prior to visit   Imaging/Laboratory:  CT brain 9/2020  Impression:  1.  No acute intracranial pathology.   2.  Mild to moderate diffuse cerebral atrophy and leukoaraiosis.  Personally reviewed         Procedures:  EEG 7/2018           Assessment and Plan:   Assessment:  Isabell Matthews is a 60 year old female who presents today for evaluation of seizures and memory problems, as well as history of stroke. In 2018 patient had multiple hospitalizations for confusion, seizures. On at least one admission acute stroke was found (right basal ganglia / frontal corona radiata / right lentiform nucleus). Imaging also with evidence of multiple chronic lacunar infarctions and chronic microvascular ischemic changes. In addition patient with multiple hospitalizations for DKA. Seizures described as unilateral shaking and loss of consciousness, unclear if in the setting of DKA. Patient has been living in [place] for about the last year and had been doing better from a medical standpoint. However she was hospitalized recently in 9/2020 for recurrent DKA. Patient referred to neurology following hospitalization for management of neurological issues.     On brief cognitive testing patient with deficits in attention, orientation, memory, fund of knowledge. Discussed with daughter that given history, agree with previous assessments that memory problems are likely related to cerebral vascular damage.  We discussed that in most cases of vascular cognitive impairment, deficits are static and non progressive if vascular risk factors are controlled. We discussed pursuing neuropsychological testing to better characterize and get baseline assessment for cognitive deficits. Patient should continue aspirin for stroke prevention.     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 She has not had a seizure in at least 1.5 years and appears to be tolerating Keppra. Patient had normal EEG in 7/2018.     Plan:  - Neuropsychological testing  - Aspirin 81 mg  - Keppra 500 mg BID    Follow up in Neurology clinic in 6 months or earlier as needed should new concerns arise.    Marshall Medina MD   of Neurology  [facility]

## 2020-11-09 ENCOUNTER — MEDICAL CORRESPONDENCE (OUTPATIENT)
Dept: HEALTH INFORMATION MANAGEMENT | Facility: CLINIC | Age: 60
End: 2020-11-09

## 2020-11-12 ENCOUNTER — PRE VISIT (OUTPATIENT)
Dept: CARDIOLOGY | Facility: CLINIC | Age: 60
End: 2020-11-12

## 2020-11-12 ENCOUNTER — OFFICE VISIT (OUTPATIENT)
Dept: CARDIOLOGY | Facility: CLINIC | Age: 60
End: 2020-11-12
Attending: STUDENT IN AN ORGANIZED HEALTH CARE EDUCATION/TRAINING PROGRAM
Payer: MEDICARE

## 2020-11-12 VITALS
OXYGEN SATURATION: 94 % | HEIGHT: 63 IN | HEART RATE: 95 BPM | SYSTOLIC BLOOD PRESSURE: 118 MMHG | WEIGHT: 172.4 LBS | DIASTOLIC BLOOD PRESSURE: 75 MMHG | BODY MASS INDEX: 30.55 KG/M2

## 2020-11-12 DIAGNOSIS — I10 ESSENTIAL HYPERTENSION: Primary | ICD-10-CM

## 2020-11-12 DIAGNOSIS — I10 BENIGN ESSENTIAL HYPERTENSION: ICD-10-CM

## 2020-11-12 DIAGNOSIS — R41.3 MEMORY LOSS: ICD-10-CM

## 2020-11-12 DIAGNOSIS — E78.00 HYPERCHOLESTEREMIA: ICD-10-CM

## 2020-11-12 PROCEDURE — G0463 HOSPITAL OUTPT CLINIC VISIT: HCPCS | Mod: 25

## 2020-11-12 PROCEDURE — 99213 OFFICE O/P EST LOW 20 MIN: CPT | Mod: 25 | Performed by: INTERNAL MEDICINE

## 2020-11-12 PROCEDURE — 93005 ELECTROCARDIOGRAM TRACING: CPT

## 2020-11-12 ASSESSMENT — PAIN SCALES - GENERAL: PAINLEVEL: NO PAIN (0)

## 2020-11-12 ASSESSMENT — MIFFLIN-ST. JEOR: SCORE: 1311.13

## 2020-11-12 NOTE — NURSING NOTE
Chief Complaint   Patient presents with     New Patient     New Patient Visit      Vitals were taken and medications were reconciled. EKG was performed.    Brittney Renteria  3:12 PM

## 2020-11-12 NOTE — LETTER
11/12/2020      RE: Isabell Matthews  [address]  [place] [address]       Dear Colleague,    Thank you for the opportunity to participate in the care of your patient, Isabell Matthews, at the [facility] HEART [facility] at [facility]. Please see a copy of my visit note below.    .card  HPI:      I had the privilege to evaluate and examine Ms Isabell Matthews, who is a 60 year old female with insulin-dependent type 2 diabetes (history of DKA, recently Sept 2020), history of stroke, TIAs, and vascular dementia, CKD stage 3, CAD s/p MI (MARY in 2004 in [place]), hypothyroidism, seizure disorder, degenerative arthritis, HTN, HLD presenting to establish care.     History obtained mostly from daughter, as patent is distractable and poor historian.  No episodes of chest pain shortness of breath fatigue nausea vomiting fevers or chills.  Recently began to establish care in the [facility] after relocating from [place] earlier this year.   Daughter is primary caregiver, very attentive to patient.     PAST MEDICAL HISTORY:  Past Medical History:   Diagnosis Date     Chronic pain      Essential hypertension      Ex-cigarette smoker     quit 7/2018 over 35 years     Hyperlipidemia      Hypothyroid      Insulin-requiring or dependent type II diabetes mellitus (H)      Seizures (H)      Stroke (H)      Vascular dementia (H)        CURRENT MEDICATIONS:  Current Outpatient Medications   Medication Sig Dispense Refill     aspirin (ASA) 81 MG chewable tablet Take 1 tablet (81 mg) by mouth daily 100 tablet 3     atorvastatin (LIPITOR) 40 MG tablet Take 1 tablet (40 mg) by mouth daily 90 tablet 3     blood glucose (NO BRAND SPECIFIED) test strip Use to test blood sugar 4-5 times daily or as directed. 400 each 6     carvedilol (COREG) 3.125 MG tablet Take 1 tablet (3.125 mg) by mouth 2 times daily (with meals) 180 tablet 3     clotrimazole (LOTRIMIN) 1 % external cream Apply  topically 2 times daily Until rash resolved 60 g 3     cyanocobalamin (VITAMIN B-12) 1000 MCG tablet Take 1 tablet (1,000 mcg) by mouth daily 100 tablet 3     DULoxetine (CYMBALTA) 20 MG capsule Take 1 capsule (20 mg) by mouth daily 90 capsule 3     hydrochlorothiazide (HYDRODIURIL) 25 MG tablet Take 1 tablet (25 mg) by mouth daily 90 tablet 3     insulin aspart (NOVOLOG PEN) 100 UNIT/ML pen Inject 0-10 Units Subcutaneous 4 times daily (with meals and nightly) Use with meals and at bedtime per sliding scale and carb correction 1 unit for 10 g carbs 12 mL 2     insulin glargine (LANTUS PEN) 100 UNIT/ML pen Inject 26 Units Subcutaneous At Bedtime 30 mL 11     insulin lispro (HUMALOG KWIKPEN) 100 UNIT/ML (1 unit dial) KWIKPEN INJ 4 UNITS WITH FOOD PLUS SLIDING SCALE. USE 24-48 UNITS PER DAY       insulin pen needle (31G X 8 MM) 31G X 8 MM miscellaneous Use 4 pen needles daily or as directed. 300 each 4     levETIRAcetam (KEPPRA) 500 MG tablet Take 1 tablet (500 mg) by mouth 2 times daily 180 tablet 3     loratadine (CLEAR-ATADINE) 10 MG tablet Take 10 mg by mouth daily       Melatonin 10 MG TABS tablet Take 10 mg by mouth At Bedtime       NIFEdipine ER (ADALAT CC) 60 MG 24 hr tablet Take 1 tablet (60 mg) by mouth daily 90 tablet 3     pregabalin (LYRICA) 100 MG capsule Take 1 capsule (100 mg) by mouth 2 times daily 60 capsule 0     SYNTHROID 88 MCG tablet Take 1 tablet (88 mcg) by mouth daily 90 tablet 3     Vitamin D3 (CHOLECALCIFEROL) 25 mcg (1000 units) tablet Take 1 tablet (25 mcg) by mouth daily 100 tablet 3       PAST SURGICAL HISTORY:  Past Surgical History:   Procedure Laterality Date     athroplasty hip Bilateral     2003, 2006     STENT         ALLERGIES     Allergies   Allergen Reactions     Pollen Extract Headache       FAMILY HISTORY:  Family History   Problem Relation Age of Onset     Myocardial Infarction Father      Dementia Paternal Grandmother      Heart Disease Paternal Grandmother        SOCIAL  HISTORY:  Social History     Socioeconomic History     Marital status:      Spouse name: Not on file     Number of children: Not on file     Years of education: Not on file     Highest education level: Not on file   Occupational History     Not on file   Social Needs     Financial resource strain: Not on file     Food insecurity     Worry: Not on file     Inability: Not on file     Transportation needs     Medical: Not on file     Non-medical: Not on file   Tobacco Use     Smoking status: Former Smoker     Packs/day: 0.50     Years: 35.00     Pack years: 17.50     Types: Cigarettes     Quit date: 2018     Years since quittin.3     Smokeless tobacco: Never Used   Substance and Sexual Activity     Alcohol use: Not Currently     Drug use: Not Currently     Sexual activity: Not Currently   Lifestyle     Physical activity     Days per week: Not on file     Minutes per session: Not on file     Stress: Not on file   Relationships     Social connections     Talks on phone: Not on file     Gets together: Not on file     Attends Alevism service: Not on file     Active member of club or organization: Not on file     Attends meetings of clubs or organizations: Not on file     Relationship status: Not on file     Intimate partner violence     Fear of current or ex partner: Not on file     Emotionally abused: Not on file     Physically abused: Not on file     Forced sexual activity: Not on file   Other Topics Concern     Parent/sibling w/ CABG, MI or angioplasty before 65F 55M? Not Asked   Social History Narrative    Recently moved to [facility] with Daughter Charli who is her pca       ROS:   Constitutional: No fever, chills, or sweats. No weight gain/loss   ENT: No visual disturbance, ear ache, epistaxis, sore throat  Allergies/Immunologic: Negative.   Respiratory: No cough, hemoptysia  Cardiovascular: As per HPI  GI: No nausea, vomiting, hematemesis, melena, or hematochezia  : No urinary frequency, dysuria,  "or hematuria  Integument: Negative  Psychiatric: Negative  Neuro: Negative  Endocrinology: Negative   Musculoskeletal: Negative    EXAM:  /75 (BP Location: Left arm, Patient Position: Chair, Cuff Size: Adult Regular)   Pulse 95   Ht 1.6 m (5' 3\")   Wt 78.2 kg (172 lb 6.4 oz)   SpO2 94%   BMI 30.54 kg/m    In general, the patient is a pleasant female in no apparent distress.    HEENT: NC/AT.  PERRLA.  EOMI.  Sclerae white, not injected.  Nares clear.  Pharynx without erythema or exudate.  Dentition intact.    Neck: No adenopathy.  No thyromegaly. Carotids +4/4 bilaterally without bruits.  No jugular venous distension.   Heart: RRR. Normal S1, S2 splits physiologically. No murmur, rub, click, or gallop. The PMI is in the 5th ICS in the midclavicular line. There is no heave.    Lungs: CTA.  No ronchi, wheezes, rales.  No dullness to percussion.   Abdomen: Soft, nontender, nondistended. No organomegaly.  No bruits.   Extremities: No clubbing, cyanosis, or edema.  The pulses are +4/4 at the radial, brachial, femoral, popliteal, DP, and PT sites bilaterally.  No bruits are noted.  Neurologic: Alert and oriented to person/place/time, normal speech, gait and affect  Skin: No petechiae, purpura or rash.    Labs:  LIPID RESULTS:  No results found for: CHOL, HDL, LDL, TRIG, CHOLHDLRATIO, NHDL    LIVER ENZYME RESULTS:  Lab Results   Component Value Date    AST 26 09/29/2020    ALT 19 09/29/2020       CBC RESULTS:  Lab Results   Component Value Date    WBC 6.0 09/29/2020    RBC 3.76 (L) 09/29/2020    HGB 10.2 (L) 09/29/2020    HCT 31.9 (L) 09/29/2020    MCV 85 09/29/2020    MCH 27.1 09/29/2020    MCHC 32.0 09/29/2020    RDW 15.8 (H) 09/29/2020     09/30/2020       BMP RESULTS:  Lab Results   Component Value Date     10/01/2020    POTASSIUM 3.5 10/01/2020    CHLORIDE 108 10/01/2020    CO2 28 10/01/2020    ANIONGAP 6 10/01/2020     (H) 10/01/2020    BUN 21 10/01/2020    CR 1.58 (H) 10/01/2020    " GFRESTIMATED 35 (L) 10/01/2020    GFRESTBLACK 40 (L) 10/01/2020    VERONICA 9.5 10/01/2020        A1C RESULTS:  Lab Results   Component Value Date    A1C 11.7 (H) 09/27/2020       INR RESULTS:  Lab Results   Component Value Date    INR 0.99 09/27/2020             Assessment and Plan:      We discussed the results with patient:  We disucssed the importance of a heart healthy lifestyle and diet and emphasis on a good diabetes diet.     Medication Changes: None.        Studies Ordered: Echocardiogram.        Please follow up: With Dr. Pollock based on results of testing    Colton Pollock MD, PhD  Professor of Medicine  Division of Cardiology       CC  Patient Care Team:  Bony Castaneda MD as PCP - General (Family Practice)  Carmen Driscoll MD as Resident (Student in organized health care education/training program)  Bony Castaneda MD as Assigned PCP  Tay Briones MD as MD (Urology)  Juju Esquivel, RN as Registered Nurse (Oncology)  LEE GAMINO    Attestation with edits by Colton Pollock MD at 12/12/2020  1:48 AM:  Patient seen and examined with CV fellow. The history and physical findings are accurate as recorded. My additional findings, if any, have been incorporated into the body of the note. All labs and other data have been reviewed personally. The assessment and recommendations outlined reflect our joint decision making.    Colton Pollock MD, PhD  Professor of Medicine  Division of Cardiology       Please do not hesitate to contact me if you have any questions/concerns.     Sincerely,     Colton Pollock MD

## 2020-11-12 NOTE — NURSING NOTE
Cardiac Testing: Patient given instructions regarding  echocardiogram . Discussed purpose, preparation, procedure and when to expect results reported back to the patient. Patient demonstrated understanding of this information and agreed to call with further questions or concerns.  Med Reconcile: Reviewed and verified all current medications with the patient. The updated medication list was printed and given to the patient.  Return Appointment: Patient given instructions regarding scheduling next clinic visit. Patient demonstrated understanding of this information and agreed to call with further questions or concerns.  Patient stated she understood all health information given and agreed to call with further questions or concerns.    Lizett Sue LPN

## 2020-11-12 NOTE — PATIENT INSTRUCTIONS
Patient Instructions:  It was a pleasure to see you in the cardiology clinic today.      If you have any questions, you can reach my nurse, Lizett FOY LPN, at [phone].  Press Option #1 for the [facility], and then press Option #4 for nursing.    We are encouraging the use of [employer] to communicate with your HealthCare Provider    Medication Changes: None.    Recommendations: None.    Studies Ordered: Echocardiogram.    The results from today include: None.    Please follow up: With Dr. Pollock based on results of testing.    Sincerely,    Colton Pollock MD     If you have an urgent need after hours (8:00 am to 4:30 pm) please call [phone] and ask for the cardiology fellow on call.

## 2020-11-12 NOTE — PROGRESS NOTES
.card  HPI:      I had the privilege to evaluate and examine Ms Isabell Matthews, who is a 60 year old female with insulin-dependent type 2 diabetes (history of DKA, recently Sept 2020), history of stroke, TIAs, and vascular dementia, CKD stage 3, CAD s/p MI ([facility] in 2004 in [place]), hypothyroidism, seizure disorder, degenerative arthritis, HTN, HLD presenting to establish care.     History obtained mostly from daughter, as patent is distractable and poor historian.  No episodes of chest pain shortness of breath fatigue nausea vomiting fevers or chills.  Recently began to establish care in the [facility] after relocating from [place] earlier this year.   Daughter is primary caregiver, very attentive to patient.     PAST MEDICAL HISTORY:  Past Medical History:   Diagnosis Date     Chronic pain      Essential hypertension      Ex-cigarette smoker     quit 7/2018 over 35 years     Hyperlipidemia      Hypothyroid      Insulin-requiring or dependent type II diabetes mellitus (H)      Seizures (H)      Stroke (H)      Vascular dementia (H)        CURRENT MEDICATIONS:  Current Outpatient Medications   Medication Sig Dispense Refill     aspirin (ASA) 81 MG chewable tablet Take 1 tablet (81 mg) by mouth daily 100 tablet 3     atorvastatin (LIPITOR) 40 MG tablet Take 1 tablet (40 mg) by mouth daily 90 tablet 3     blood glucose (NO BRAND SPECIFIED) test strip Use to test blood sugar 4-5 times daily or as directed. 400 each 6     carvedilol (COREG) 3.125 MG tablet Take 1 tablet (3.125 mg) by mouth 2 times daily (with meals) 180 tablet 3     clotrimazole (LOTRIMIN) 1 % external cream Apply topically 2 times daily Until rash resolved 60 g 3     cyanocobalamin (VITAMIN B-12) 1000 MCG tablet Take 1 tablet (1,000 mcg) by mouth daily 100 tablet 3     DULoxetine (CYMBALTA) 20 MG capsule Take 1 capsule (20 mg) by mouth daily 90 capsule 3     hydrochlorothiazide (HYDRODIURIL) 25 MG tablet Take 1 tablet (25 mg) by mouth daily 90  tablet 3     insulin aspart (NOVOLOG PEN) 100 UNIT/ML pen Inject 0-10 Units Subcutaneous 4 times daily (with meals and nightly) Use with meals and at bedtime per sliding scale and carb correction 1 unit for 10 g carbs 12 mL 2     insulin glargine (LANTUS PEN) 100 UNIT/ML pen Inject 26 Units Subcutaneous At Bedtime 30 mL 11     insulin lispro (HUMALOG KWIKPEN) 100 UNIT/ML (1 unit dial) KWIKPEN INJ 4 UNITS WITH FOOD PLUS SLIDING SCALE. USE 24-48 UNITS PER DAY       insulin pen needle (31G X 8 MM) 31G X 8 MM miscellaneous Use 4 pen needles daily or as directed. 300 each 4     levETIRAcetam (KEPPRA) 500 MG tablet Take 1 tablet (500 mg) by mouth 2 times daily 180 tablet 3     loratadine (CLEAR-ATADINE) 10 MG tablet Take 10 mg by mouth daily       Melatonin 10 MG TABS tablet Take 10 mg by mouth At Bedtime       NIFEdipine ER (ADALAT CC) 60 MG 24 hr tablet Take 1 tablet (60 mg) by mouth daily 90 tablet 3     pregabalin (LYRICA) 100 MG capsule Take 1 capsule (100 mg) by mouth 2 times daily 60 capsule 0     SYNTHROID 88 MCG tablet Take 1 tablet (88 mcg) by mouth daily 90 tablet 3     Vitamin D3 (CHOLECALCIFEROL) 25 mcg (1000 units) tablet Take 1 tablet (25 mcg) by mouth daily 100 tablet 3       PAST SURGICAL HISTORY:  Past Surgical History:   Procedure Laterality Date     athroplasty hip Bilateral     2003, 2006     STENT         ALLERGIES     Allergies   Allergen Reactions     Pollen Extract Headache       FAMILY HISTORY:  Family History   Problem Relation Age of Onset     Myocardial Infarction Father      Dementia Paternal Grandmother      Heart Disease Paternal Grandmother        SOCIAL HISTORY:  Social History     Socioeconomic History     Marital status:      Spouse name: Not on file     Number of children: Not on file     Years of education: Not on file     Highest education level: Not on file   Occupational History     Not on file   Social Needs     Financial resource strain: Not on file     Food insecurity     " Worry: Not on file     Inability: Not on file     Transportation needs     Medical: Not on file     Non-medical: Not on file   Tobacco Use     Smoking status: Former Smoker     Packs/day: 0.50     Years: 35.00     Pack years: 17.50     Types: Cigarettes     Quit date: 2018     Years since quittin.3     Smokeless tobacco: Never Used   Substance and Sexual Activity     Alcohol use: Not Currently     Drug use: Not Currently     Sexual activity: Not Currently   Lifestyle     Physical activity     Days per week: Not on file     Minutes per session: Not on file     Stress: Not on file   Relationships     Social connections     Talks on phone: Not on file     Gets together: Not on file     Attends [employer] service: Not on file     Active member of club or organization: Not on file     Attends meetings of clubs or organizations: Not on file     Relationship status: Not on file     Intimate partner violence     Fear of current or ex partner: Not on file     Emotionally abused: Not on file     Physically abused: Not on file     Forced sexual activity: Not on file   Other Topics Concern     Parent/sibling w/ CABG, MI or angioplasty before 65F 55M? Not Asked   Social History Narrative    Recently moved to [facility] with Daughter Charli who is her pca       ROS:   Constitutional: No fever, chills, or sweats. No weight gain/loss   ENT: No visual disturbance, ear ache, epistaxis, sore throat  Allergies/Immunologic: Negative.   Respiratory: No cough, hemoptysia  Cardiovascular: As per HPI  GI: No nausea, vomiting, hematemesis, melena, or hematochezia  : No urinary frequency, dysuria, or hematuria  Integument: Negative  Psychiatric: Negative  Neuro: Negative  Endocrinology: Negative   Musculoskeletal: Negative    EXAM:  /75 (BP Location: Left arm, Patient Position: Chair, Cuff Size: Adult Regular)   Pulse 95   Ht 1.6 m (5' 3\")   Wt 78.2 kg (172 lb 6.4 oz)   SpO2 94%   BMI 30.54 kg/m    In general, the patient " is a pleasant female in no apparent distress.    HEENT: NC/AT.  PERRLA.  EOMI.  Sclerae white, not injected.  Nares clear.  Pharynx without erythema or exudate.  Dentition intact.    Neck: No adenopathy.  No thyromegaly. Carotids +4/4 bilaterally without bruits.  No jugular venous distension.   Heart: RRR. Normal S1, S2 splits physiologically. No murmur, rub, click, or gallop. The PMI is in the 5th ICS in the midclavicular line. There is no heave.    Lungs: CTA.  No ronchi, wheezes, rales.  No dullness to percussion.   Abdomen: Soft, nontender, nondistended. No organomegaly.  No bruits.   Extremities: No clubbing, cyanosis, or edema.  The pulses are +4/4 at the radial, brachial, femoral, popliteal, DP, and PT sites bilaterally.  No bruits are noted.  Neurologic: Alert and oriented to person/place/time, normal speech, gait and affect  Skin: No petechiae, purpura or rash.    Labs:  LIPID RESULTS:  No results found for: CHOL, HDL, LDL, TRIG, CHOLHDLRATIO, NHDL    LIVER ENZYME RESULTS:  Lab Results   Component Value Date    AST 26 09/29/2020    ALT 19 09/29/2020       CBC RESULTS:  Lab Results   Component Value Date    WBC 6.0 09/29/2020    RBC 3.76 (L) 09/29/2020    HGB 10.2 (L) 09/29/2020    HCT 31.9 (L) 09/29/2020    MCV 85 09/29/2020    MCH 27.1 09/29/2020    MCHC 32.0 09/29/2020    RDW 15.8 (H) 09/29/2020     09/30/2020       BMP RESULTS:  Lab Results   Component Value Date     10/01/2020    POTASSIUM 3.5 10/01/2020    CHLORIDE 108 10/01/2020    CO2 28 10/01/2020    ANIONGAP 6 10/01/2020     (H) 10/01/2020    BUN 21 10/01/2020    CR 1.58 (H) 10/01/2020    GFRESTIMATED 35 (L) 10/01/2020    GFRESTBLACK 40 (L) 10/01/2020    VERONICA 9.5 10/01/2020        A1C RESULTS:  Lab Results   Component Value Date    A1C 11.7 (H) 09/27/2020       INR RESULTS:  Lab Results   Component Value Date    INR 0.99 09/27/2020             Assessment and Plan:      We discussed the results with patient:  We disucssed the  importance of a heart healthy lifestyle and diet and emphasis on a good diabetes diet.     Medication Changes: None.        Studies Ordered: Echocardiogram.        Please follow up: With Dr. Pollock based on results of testing    Colton Pollock MD, PhD  Professor of Medicine  Division of Cardiology       CC  Patient Care Team:  Bony Castaneda MD as PCP - General (Family Practice)  Carmen Driscoll MD as Resident (Student in organized health care education/training program)  Bony Castaneda MD as Assigned PCP  Tay Briones MD as MD (Urology)  Juju Esquivel, RN as Registered Nurse (Oncology)  LEE GAMINO

## 2020-11-13 LAB — INTERPRETATION ECG - MUSE: NORMAL

## 2020-11-16 ENCOUNTER — TELEPHONE (OUTPATIENT)
Dept: FAMILY MEDICINE | Facility: CLINIC | Age: 60
End: 2020-11-16

## 2020-11-16 NOTE — TELEPHONE ENCOUNTER
[facility] now requests orders and shares plan of care/discharge summaries for some patients through WebNotes. Please REPLY TO THIS MESSAGE OR ROUTE BACK TO THE AUTHOR in order to give authorization for orders when needed. This is considered a verbal order, you will still receive a faxed copy of orders for signature.  Thank you for your assistance in improving collaboration for our patients.    ORDER:  Requesting an additional PT visit this week to make up for the missed visit from last week.  Continuing with current PT POC.    Mian Post, PT, DPT  [email or web address]  [phone].

## 2020-11-19 ENCOUNTER — TELEPHONE (OUTPATIENT)
Dept: FAMILY MEDICINE | Facility: CLINIC | Age: 60
End: 2020-11-19

## 2020-11-19 DIAGNOSIS — R26.89 BALANCE PROBLEMS: ICD-10-CM

## 2020-11-19 DIAGNOSIS — E10.59 TYPE 1 DIABETES MELLITUS WITH OTHER CIRCULATORY COMPLICATION (H): Primary | ICD-10-CM

## 2020-11-19 DIAGNOSIS — M25.50 MULTIPLE JOINT PAIN: ICD-10-CM

## 2020-11-19 NOTE — TELEPHONE ENCOUNTER
Isabell would benefit from a 4 wheeled walker to improve her ability to ambulate, reduce her back pain, and give her a place to sit while ambulating longer distances to improve community access.  Her MA can help cover the cost, and she and her daughter would like to pursue this through [facility].     PT is requesting an order for a 4 wheel walker and face sheet with patient demographics be faxed over to [facility] as soon as possible.    Thank you,  Mian Post, PT, DPT  [phone]

## 2020-11-20 NOTE — TELEPHONE ENCOUNTER
Reviewed and approved request.  Isabell chart reviewed today for dme order request.    Diagnoses and all orders for this visit:    Type 1 diabetes mellitus with other circulatory complication (H)  -     Walker Order for DME - ONLY FOR DME    Multiple joint pain  -     Walker Order for DME - ONLY FOR DME    Balance problems  -     Walker Order for DME - ONLY FOR DME      Thank you  Bony Castaneda MD

## 2020-11-23 ENCOUNTER — MEDICAL CORRESPONDENCE (OUTPATIENT)
Dept: HEALTH INFORMATION MANAGEMENT | Facility: CLINIC | Age: 60
End: 2020-11-23

## 2020-11-23 NOTE — TELEPHONE ENCOUNTER
DME order faxed to [facility]. [phone]  Toshia Peñaloza, EMT at 3:26 PM on 11/23/2020.

## 2020-12-01 DIAGNOSIS — M47.899 OTHER OSTEOARTHRITIS OF SPINE, UNSPECIFIED SPINAL REGION: ICD-10-CM

## 2020-12-01 DIAGNOSIS — G62.9 NEUROPATHY: ICD-10-CM

## 2020-12-01 NOTE — TELEPHONE ENCOUNTER
[facility] Call Center    Phone Message    May a detailed message be left on voicemail: yes     Reason for Call: Medication Refill Request    Has the patient contacted the pharmacy for the refill? Yes   Name of medication being requested: pregabalin (LYRICA) 100 MG capsule     Provider who prescribed the medication: Bony Castaneda MD  Pharmacy: [id number] - [place], [place] - [address] & [facility]  Date medication is needed: asap     The patient is completely out of this medication because they sent a refill request through the pharmacy last week and never got a update because we never got a refill request from [facility] please send order asap       Action Taken: Message routed to:  [facility] ([facility] sampson    Travel Screening: Not Applicable

## 2020-12-02 ENCOUNTER — VIRTUAL VISIT (OUTPATIENT)
Dept: UROLOGY | Facility: CLINIC | Age: 60
End: 2020-12-02
Payer: MEDICARE

## 2020-12-02 ENCOUNTER — TELEPHONE (OUTPATIENT)
Dept: UROLOGY | Facility: CLINIC | Age: 60
End: 2020-12-02

## 2020-12-02 DIAGNOSIS — N39.41 URGE INCONTINENCE: ICD-10-CM

## 2020-12-02 DIAGNOSIS — N95.2 ATROPHIC VAGINITIS: ICD-10-CM

## 2020-12-02 DIAGNOSIS — R39.15 URINARY URGENCY: Primary | ICD-10-CM

## 2020-12-02 PROCEDURE — 99214 OFFICE O/P EST MOD 30 MIN: CPT | Mod: 95 | Performed by: UROLOGY

## 2020-12-02 RX ORDER — PREGABALIN 100 MG/1
100 CAPSULE ORAL 2 TIMES DAILY
Qty: 60 CAPSULE | Refills: 0 | Status: SHIPPED | OUTPATIENT
Start: 2020-12-02 | End: 2020-12-16

## 2020-12-02 NOTE — NURSING NOTE
Chief Complaint   Patient presents with     Consult     urinary incontinence       Patient Active Problem List   Diagnosis     Benign essential hypertension     Acquired hypothyroidism     Seizures (H)     Hyperlipidemia LDL goal <100     History of insulin dependent diabetes mellitus     Vitamin D deficiency     Vitamin B12 deficiency (non anemic)     Other osteoarthritis of spine, unspecified spinal region     DKA (diabetic ketoacidoses) (H)     Nephrolithiasis     Left renal mass     Mixed stress and urge urinary incontinence     Chest pressure     Coronary atherosclerosis     Dehydration     DM type 1 (diabetes mellitus, type 1) (H)     HTN (hypertension)     Nausea & vomiting     Otitis media of right ear     Diabetic keto-acidosis (H)     Ischemic vascular dementia (H)     Fungal dermatitis     Nail deformity       Allergies   Allergen Reactions     Pollen Extract Headache       Current Outpatient Medications   Medication Sig Dispense Refill     aspirin (ASA) 81 MG chewable tablet Take 1 tablet (81 mg) by mouth daily 100 tablet 3     atorvastatin (LIPITOR) 40 MG tablet Take 1 tablet (40 mg) by mouth daily 90 tablet 3     blood glucose (NO BRAND SPECIFIED) test strip Use to test blood sugar 4-5 times daily or as directed. 400 each 6     carvedilol (COREG) 3.125 MG tablet Take 1 tablet (3.125 mg) by mouth 2 times daily (with meals) 180 tablet 3     clotrimazole (LOTRIMIN) 1 % external cream Apply topically 2 times daily Until rash resolved 60 g 3     cyanocobalamin (VITAMIN B-12) 1000 MCG tablet Take 1 tablet (1,000 mcg) by mouth daily 100 tablet 3     DULoxetine (CYMBALTA) 20 MG capsule Take 1 capsule (20 mg) by mouth daily 90 capsule 3     hydrochlorothiazide (HYDRODIURIL) 25 MG tablet Take 1 tablet (25 mg) by mouth daily 90 tablet 3     insulin aspart (NOVOLOG PEN) 100 UNIT/ML pen Inject 0-10 Units Subcutaneous 4 times daily (with meals and nightly) Use with meals and at bedtime per sliding scale and carb  correction 1 unit for 10 g carbs 12 mL 2     insulin glargine (LANTUS PEN) 100 UNIT/ML pen Inject 26 Units Subcutaneous At Bedtime 30 mL 11     insulin lispro (HUMALOG KWIKPEN) 100 UNIT/ML (1 unit dial) KWIKPEN INJ 4 UNITS WITH FOOD PLUS SLIDING SCALE. USE 24-48 UNITS PER DAY       insulin pen needle (31G X 8 MM) 31G X 8 MM miscellaneous Use 4 pen needles daily or as directed. 300 each 4     levETIRAcetam (KEPPRA) 500 MG tablet Take 1 tablet (500 mg) by mouth 2 times daily 180 tablet 3     loratadine (CLEAR-ATADINE) 10 MG tablet Take 10 mg by mouth daily       Melatonin 10 MG TABS tablet Take 10 mg by mouth At Bedtime       NIFEdipine ER (ADALAT CC) 60 MG 24 hr tablet Take 1 tablet (60 mg) by mouth daily 90 tablet 3     pregabalin (LYRICA) 100 MG capsule Take 1 capsule (100 mg) by mouth 2 times daily 60 capsule 0     SYNTHROID 88 MCG tablet Take 1 tablet (88 mcg) by mouth daily 90 tablet 3     Vitamin D3 (CHOLECALCIFEROL) 25 mcg (1000 units) tablet Take 1 tablet (25 mcg) by mouth daily 100 tablet 3       Social History     Tobacco Use     Smoking status: Former Smoker     Packs/day: 0.50     Years: 35.00     Pack years: 17.50     Types: Cigarettes     Quit date: 2018     Years since quittin.4     Smokeless tobacco: Never Used   Substance Use Topics     Alcohol use: Not Currently     Drug use: Not Currently       Andie Rodrigues LPN  2020  2:15 PM

## 2020-12-02 NOTE — PROGRESS NOTES
"Video Visit Technology for this patient: Aubree Video Visit- Patient was left in waiting room    Isabell Matthews is a 60 year old female who is being evaluated via a billable video visit.      The patient has been notified of following:     \"This video visit will be conducted via a call between you and your physician/provider. We have found that certain health care needs can be provided without the need for an in-person physical exam.  This service lets us provide the care you need with a video conversation.  If a prescription is necessary we can send it directly to your pharmacy.  If lab work is needed we can place an order for that and you can then stop by our lab to have the test done at a later time.    Video visits are billed at different rates depending on your insurance coverage.  Please reach out to your insurance provider with any questions.    If during the course of the call the physician/provider feels a video visit is not appropriate, you will not be charged for this service.\"    Patient has given verbal consent for Video visit? Yes  How would you like to obtain your AVS? MyChart  If you are dropped from the video visit, the video invite should be resent to: Send to e-mail at: [email or web address]  Will anyone else be joining your video visit? No        Video-Visit Details    Type of service:  Video Visit    Video Start Time: 2:26 PM  Video End Time: 2:52 PM    Originating Location (pt. Location): Home    Distant Location (provider location):  [facility] UROLOGY [facility]     Platform used for Video Visit: Aubree Krishnan MD        "

## 2020-12-02 NOTE — TELEPHONE ENCOUNTER
[facility] Call Center    Phone Message    May a detailed message be left on voicemail: no     Reason for Call: Other: Isabell's daughter calling to check Isabell in     Action Taken: Other: [facility]    Travel Screening: Not Applicable

## 2020-12-02 NOTE — PATIENT INSTRUCTIONS
-voiding diary  -elevate legs in the afternoon  -try taking hydrochlorothiazide in the afternoon instead of am  -limit evening tea  -send information on SNS both [employer] and [employer].  -f/u in a month to review voiding diary and symptoms

## 2020-12-02 NOTE — PROGRESS NOTES
CC: Incontinence    HPI:  Isabell Matthews is a 60 year old female asked to be seen in consultation by Dr. Castaneda  for the above.  This problem has been going on since late summer of 2020 and has been getting worse.  It occurs w/o awareness, but seems to be more at night.   She has had no previous treatment for her condition.  The patient voids q4-5 X per day, she double voids, nocturia X 1-2.  She drinks mainly water and tea, coffee in am 3 days out of the week.  She has been drinking a lot of her fluids at night, it is herbal.  She denies any dysuria, hematuria, hesitancy, intermittency, feeling of incomplete emptying, or any recent hx of UTI's.  She does have punctate stones.    The patient has no constipation or splinting.  She is not sexually active and denies any dyspareunia or pelvic pain.   She denies any vaginal bulge.  She has hx of stroke and has vascular dimentia.       Obstetric Hx:  She is .  The weight of her largest baby was 8 lbs 0oz.  Babies were delivered vaginally.  Menopause mid 50's, HRT:  none    Past Medical History:   Diagnosis Date     Chronic pain      Essential hypertension      Ex-cigarette smoker     quit 2018 over 35 years     Hyperlipidemia      Hypothyroid      Insulin-requiring or dependent type II diabetes mellitus (H)      Seizures (H)      Stroke (H)      Vascular dementia (H)        Past Surgical History:   Procedure Laterality Date     athroplasty hip Bilateral     ,      STENT         Meds, Allergies, FHx and SHx reviewed per nurse's intake note.    ROS is negative on a 14 point scale except for pain in hips, shoulders, and knees b/c of DJD.  All other positive and pertinent information is mentioned in the HPI.    PEx:   not currently breastfeeding.  Data Unavailable, There is no height or weight on file to calculate BMI., 0 lbs 0 oz  Gen appearance:  Well groomed  HEENT:  EOMI, AT NC  Psych:  Normal Affect  Neuro:  A/O X 3  Skin:  Well perfused  Resp:  No  increased respiratory effort  Musk:  Full ROM in extremities  :  deferred    UA: UA RESULTS:  Recent Labs   Lab Test 09/27/20  1906   COLOR Light Yellow   APPEARANCE Slightly Cloudy   URINEGLC >1000*   URINEBILI Negative   URINEKETONE 40*   SG 1.020   UBLD Negative   URINEPH 5.0   PROTEIN Negative   NITRITE Negative   LEUKEST Negative   RBCU <1   WBCU 1         ASSESSMENT and PLAN:  This is a 60 year old female with urinary urgency and urge incontinence as well as incontinence without awareness as well as atrophic vaginitis.  Different management options were discussed with the patient including observation, PT, medication, PTNS, SNS, and botox.  We also discussed behavioral medication such as elevating her legs in the afternoon, taking her diuretic in the afternoon, limiting tea in the evening.  -voiding diary  -elevate legs in the afternoon  -try taking hydrochlorothiazide in the afternoon instead of am  -limit evening tea  -send information on SNS both [employer] and [employer].  -f/u in a month to review voiding diary and symptoms    Thank you for allowing me to participate in Ms. Matthews's care.  I will keep you updated on her progress.    Prudencio Krishnan MD

## 2020-12-02 NOTE — LETTER
"12/2/2020       RE: Iasbell Matthews  [address]  [place] [address]     Dear Colleague,    Thank you for referring your patient, Isabell Matthews, to the [facility] UROLOGY CLINIC [place] at [facility]. Please see a copy of my visit note below.    Video Visit Technology for this patient: Aubree Video Visit- Patient was left in waiting room    Isabell Matthews is a 60 year old female who is being evaluated via a billable video visit.      The patient has been notified of following:     \"This video visit will be conducted via a call between you and your physician/provider. We have found that certain health care needs can be provided without the need for an in-person physical exam.  This service lets us provide the care you need with a video conversation.  If a prescription is necessary we can send it directly to your pharmacy.  If lab work is needed we can place an order for that and you can then stop by our lab to have the test done at a later time.    Video visits are billed at different rates depending on your insurance coverage.  Please reach out to your insurance provider with any questions.    If during the course of the call the physician/provider feels a video visit is not appropriate, you will not be charged for this service.\"    Patient has given verbal consent for Video visit? Yes  How would you like to obtain your AVS? MyChart  If you are dropped from the video visit, the video invite should be resent to: Send to e-mail at: [email or web address]  Will anyone else be joining your video visit? No        Video-Visit Details    Type of service:  Video Visit    Video Start Time: 2:26 PM  Video End Time: 2:52 PM    Originating Location (pt. Location): Home    Distant Location (provider location):  [facility] UROLOGY [facility]     Platform used for Video Visit: Aubree Krishnan MD          CC: Incontinence    HPI:  Isabell Small " Cassie is a 60 year old female asked to be seen in consultation by Dr. Castaneda  for the above.  This problem has been going on since late summer of 2020 and has been getting worse.  It occurs w/o awareness, but seems to be more at night.   She has had no previous treatment for her condition.  The patient voids q4-5 X per day, she double voids, nocturia X 1-2.  She drinks mainly water and tea, coffee in am 3 days out of the week.  She has been drinking a lot of her fluids at night, it is herbal.  She denies any dysuria, hematuria, hesitancy, intermittency, feeling of incomplete emptying, or any recent hx of UTI's.  She does have punctate stones.    The patient has no constipation or splinting.  She is not sexually active and denies any dyspareunia or pelvic pain.   She denies any vaginal bulge.  She has hx of stroke and has vascular dimentia.       Obstetric Hx:  She is .  The weight of her largest baby was 8 lbs 0oz.  Babies were delivered vaginally.  Menopause mid 50's, HRT:  none    Past Medical History:   Diagnosis Date     Chronic pain      Essential hypertension      Ex-cigarette smoker     quit 2018 over 35 years     Hyperlipidemia      Hypothyroid      Insulin-requiring or dependent type II diabetes mellitus (H)      Seizures (H)      Stroke (H)      Vascular dementia (H)        Past Surgical History:   Procedure Laterality Date     athroplasty hip Bilateral     ,      STENT         Meds, Allergies, FHx and SHx reviewed per nurse's intake note.    ROS is negative on a 14 point scale except for pain in hips, shoulders, and knees b/c of DJD.  All other positive and pertinent information is mentioned in the HPI.    PEx:   not currently breastfeeding.  Data Unavailable, There is no height or weight on file to calculate BMI., 0 lbs 0 oz  Gen appearance:  Well groomed  HEENT:  EOMI, AT NC  Psych:  Normal Affect  Neuro:  A/O X 3  Skin:  Well perfused  Resp:  No increased respiratory effort  Musk:  Full  ROM in extremities  :  deferred    UA: UA RESULTS:  Recent Labs   Lab Test 09/27/20  1906   COLOR Light Yellow   APPEARANCE Slightly Cloudy   URINEGLC >1000*   URINEBILI Negative   URINEKETONE 40*   SG 1.020   UBLD Negative   URINEPH 5.0   PROTEIN Negative   NITRITE Negative   LEUKEST Negative   RBCU <1   WBCU 1         ASSESSMENT and PLAN:  This is a 60 year old female with urinary urgency and urge incontinence as well as incontinence without awareness as well as atrophic vaginitis.  Different management options were discussed with the patient including observation, PT, medication, PTNS, SNS, and botox.  We also discussed behavioral medication such as elevating her legs in the afternoon, taking her diuretic in the afternoon, limiting tea in the evening.  -voiding diary  -elevate legs in the afternoon  -try taking hydrochlorothiazide in the afternoon instead of am  -limit evening tea  -send information on SNS both [employer] and [employer].  -f/u in a month to review voiding diary and symptoms    Thank you for allowing me to participate in Ms. Matthews's care.  I will keep you updated on her progress.    Prudencio Krishnan MD

## 2020-12-02 NOTE — TELEPHONE ENCOUNTER
Patient Requested  pregabalin (LYRICA) 100 MG capsule  Last Filled  10/21/2020  Last Office Visit  09/02/2020  Next Office Visit     Checked  12/02/2020    DX: Other osteoarthritis of spine, unspecified spinal region     Pharmacy: [id number] - [place], [place] - [address] AT [facility] KATHE TOWNSEND CMA at 11:10 AM on 12/2/2020.

## 2020-12-07 DIAGNOSIS — E11.10 DIABETIC KETOACIDOSIS WITHOUT COMA ASSOCIATED WITH TYPE 2 DIABETES MELLITUS (H): ICD-10-CM

## 2020-12-07 DIAGNOSIS — Z86.39 HISTORY OF INSULIN DEPENDENT DIABETES MELLITUS: ICD-10-CM

## 2020-12-07 NOTE — TELEPHONE ENCOUNTER
Fax from [facility] in [facility] requests refill of insulin aspart (NOVOLOG PEN) 100 UNIT/ML pen, Route: Inject 0-10 Units Subcutaneous 4 times daily (with meals and nightly) Use with meals and at bedtime per sliding scale and carb correction 1 unit for 10 g carbs.  Use 24-48 units per day.    Dorina Argueta RN on 12/7/2020 at 3:43 PM

## 2020-12-09 ENCOUNTER — MYC MEDICAL ADVICE (OUTPATIENT)
Dept: FAMILY MEDICINE | Facility: CLINIC | Age: 60
End: 2020-12-09

## 2020-12-09 DIAGNOSIS — E10.59 TYPE 1 DIABETES MELLITUS WITH OTHER CIRCULATORY COMPLICATION (H): Primary | ICD-10-CM

## 2020-12-09 DIAGNOSIS — F01.50: ICD-10-CM

## 2020-12-16 ENCOUNTER — VIRTUAL VISIT (OUTPATIENT)
Dept: FAMILY MEDICINE | Facility: CLINIC | Age: 60
End: 2020-12-16
Payer: MEDICARE

## 2020-12-16 DIAGNOSIS — E03.9 ACQUIRED HYPOTHYROIDISM: ICD-10-CM

## 2020-12-16 DIAGNOSIS — G62.9 NEUROPATHY: ICD-10-CM

## 2020-12-16 DIAGNOSIS — M47.899 OTHER OSTEOARTHRITIS OF SPINE, UNSPECIFIED SPINAL REGION: ICD-10-CM

## 2020-12-16 DIAGNOSIS — Z12.31 VISIT FOR SCREENING MAMMOGRAM: ICD-10-CM

## 2020-12-16 DIAGNOSIS — E10.59 TYPE 1 DIABETES MELLITUS WITH OTHER CIRCULATORY COMPLICATION (H): Primary | ICD-10-CM

## 2020-12-16 DIAGNOSIS — I10 BENIGN ESSENTIAL HYPERTENSION: ICD-10-CM

## 2020-12-16 PROCEDURE — 99214 OFFICE O/P EST MOD 30 MIN: CPT | Mod: 95 | Performed by: FAMILY MEDICINE

## 2020-12-16 RX ORDER — HYDROCHLOROTHIAZIDE 25 MG/1
TABLET ORAL
Qty: 90 TABLET | Refills: 3 | Status: ON HOLD | COMMUNITY
Start: 2020-12-16 | End: 2021-06-27

## 2020-12-16 RX ORDER — PREGABALIN 100 MG/1
100 CAPSULE ORAL 2 TIMES DAILY
Qty: 60 CAPSULE | Refills: 0 | Status: SHIPPED | OUTPATIENT
Start: 2021-01-01 | End: 2021-02-16

## 2020-12-16 NOTE — NURSING NOTE
Chief Complaint   Patient presents with     Imm/Inj     Per caregiver, wondering about COVID-19 vaccine      Throat Problem     Per caregiver, possible dysphagia      Crystal De La Fuente, EMT at 9:38 AM sign on 12/16/2020

## 2020-12-16 NOTE — PROGRESS NOTES
"Video Visit Technology for this patient: [facility] Video Visit- Patient was left in waiting room     Isabell Matthews is a 60 year old female who is being evaluated via a billable video visit.      The patient has been notified of following:     \"This video visit will be conducted via a call between you and your physician/provider. We have found that certain health care needs can be provided without the need for an in-person physical exam.  This service lets us provide the care you need with a video conversation.  If a prescription is necessary we can send it directly to your pharmacy.  If lab work is needed we can place an order for that and you can then stop by our lab to have the test done at a later time.    Video visits are billed at different rates depending on your insurance coverage.  Please reach out to your insurance provider with any questions.    If during the course of the call the physician/provider feels a video visit is not appropriate, you will not be charged for this service.\"    Patient has given verbal consent for Video visit? Yes  How would you like to obtain your AVS? MyChart  If you are dropped from the video visit, the video invite should be resent to: [phone]   Will anyone else be joining your video visit? Yes: Ángela (caregiver) . How would they like to receive their invitation? Text to cell phone: [phone]       Subjective     Isabell Matthews is a 60 year old female who presents today via video visit for the following health issues:    HPI      Isabell Matthews has a complicated past medical history which includes diabetes insulin requiring with history of ketoacidosis, renal impairment, hypothyroidism, coronary artery disease with MI, hyperlipidemia, hypertension, seizure disorder, vascular dementia degernative arthritis and left  renal mass.  She recently moved to the [facility] now living in [place].  Her daughter Maria Guadalupe is providing most of her cares and serving as her PCA.  Isabell Matthews " presents with her daughter Maria Guadalupe via video. Isabell is doing well at this time she met with urology and cardiology.  The cardiologist mentioned to them that she could consider reduction in dose of hydrochlorothiazide perhaps to also assist with her urinary frequency.  Her blood pressures been under excellent control and they now have a blood pressure monitor to check her blood pressure on a regular basis therefore I reviewed we could have her cut the hydrochlorothiazide in half for a week making sure blood pressures less than 140/90 and then discontinue hydrochlorothiazide continuing to check her blood pressure off of hydrochlorothiazide goal less than 140/90.  I will ask her daughter Maria Guadalupe to contact me once titration off hydrochlorothiazide has been completed with stable blood pressure and then we will discontinue from her pharmacy file.  Immunizations  Much discussion today was on the COVID-19 vaccine however I discussed I am not certain when this will be available for the general public.  As she does have several chronic health conditions I recommended consideration of the COVID-19 vaccine as soon is available for the public for both Lisa and her daughter.  Diabetes mellitus.   Has been under excellent control on current regimen currently receiving about 3 to 4 units prior to meals of short acting insulin and also receiving Lantus as noted below.  Healthcare maintenance  Due for mammography sometime in the next year.  I will try to review her records to determine when she is due for colonoscopy  Review of systems  She has chronic pain and missed refill for Lyrica the last renewal therefore went through some withdrawal and with the holiday coming up they were wondering if I could fill the Lyrica or have it on file at the local pharmacy to avoid lapse in prescription as she had significant tremor and feeling of unease when she was off the Lyrica for a few days.  I sent a refill to her pharmacy first fill January 1,  2021.  She notes some trouble swallowing all of her pills in the morning therefore I suggested switch of some nonessential pills to midday such as vitamins.  She has no trouble swallowing food or drinking water.  Stroke symptoms are stable.  No signs of viral illness no current cough, she is happy to be living in [place].  Social update her daughter has been able to connect with  through the [facility] and is trying to obtain the [employer] waiver to assist with additional services.  She is working as her PCA and through the [employer] waiver will be able to continue in this function.  Patient Active Problem List   Diagnosis     Benign essential hypertension     Acquired hypothyroidism     Seizures (H)     Hyperlipidemia LDL goal <100     History of insulin dependent diabetes mellitus     Vitamin D deficiency     Vitamin B12 deficiency (non anemic)     Other osteoarthritis of spine, unspecified spinal region     DKA (diabetic ketoacidoses) (H)     Nephrolithiasis     Left renal mass     Mixed stress and urge urinary incontinence     Chest pressure     Coronary atherosclerosis     Dehydration     DM type 1 (diabetes mellitus, type 1) (H)     HTN (hypertension)     Nausea & vomiting     Otitis media of right ear     Diabetic keto-acidosis (H)     Ischemic vascular dementia (H)     Fungal dermatitis     Nail deformity     Past Medical History:   Diagnosis Date     Chronic pain      Essential hypertension      Ex-cigarette smoker     quit 7/2018 over 35 years     Hyperlipidemia      Hypothyroid      Insulin-requiring or dependent type II diabetes mellitus (H)      Seizures (H)      Stroke (H)      Vascular dementia (H)      Current Outpatient Medications   Medication Sig Dispense Refill     aspirin (ASA) 81 MG EC tablet Take 1 tablet (81 mg) by mouth daily 100 tablet 3     atorvastatin (LIPITOR) 40 MG tablet Take 1 tablet (40 mg) by mouth daily 90 tablet 3     blood glucose (NO BRAND SPECIFIED) test strip Use to test  blood sugar 4-5 times daily or as directed. 400 each 6     carvedilol (COREG) 3.125 MG tablet Take 1 tablet (3.125 mg) by mouth 2 times daily (with meals) 180 tablet 3     clotrimazole (LOTRIMIN) 1 % external cream Apply topically 2 times daily Until rash resolved 60 g 3     cyanocobalamin (VITAMIN B-12) 1000 MCG tablet Take 1 tablet (1,000 mcg) by mouth daily 100 tablet 3     DULoxetine (CYMBALTA) 20 MG capsule Take 1 capsule (20 mg) by mouth daily 90 capsule 3     hydrochlorothiazide (HYDRODIURIL) 25 MG tablet Taper to one half for one week then off if BP less than 140/90 90 tablet 3     insulin aspart (NOVOLOG PEN) 100 UNIT/ML pen Use with meals and at bedtime per sliding scale and carb correction 1 unit for 10 g carbs,  Route: Inject 0-10 Units Subcutaneous 4 times daily (with meals and nightly) Use with meals and at bedtime per sliding scale and carb correction 1 unit for 10 g carbs.  Use 24-48 units per day. 45 mL 0     insulin glargine (LANTUS PEN) 100 UNIT/ML pen Inject 26 Units Subcutaneous At Bedtime 30 mL 11     insulin pen needle (31G X 8 MM) 31G X 8 MM miscellaneous Use 4 pen needles daily or as directed. 300 each 4     levETIRAcetam (KEPPRA) 500 MG tablet Take 1 tablet (500 mg) by mouth 2 times daily 180 tablet 3     loratadine (CLEAR-ATADINE) 10 MG tablet Take 10 mg by mouth daily       Melatonin 10 MG TABS tablet Take 10 mg by mouth At Bedtime       NIFEdipine ER (ADALAT CC) 60 MG 24 hr tablet Take 1 tablet (60 mg) by mouth daily 90 tablet 3     [START ON 1/1/2021] pregabalin (LYRICA) 100 MG capsule Take 1 capsule (100 mg) by mouth 2 times daily 60 capsule 0     SYNTHROID 88 MCG tablet Take 1 tablet (88 mcg) by mouth daily 90 tablet 3     Vitamin D3 (CHOLECALCIFEROL) 25 mcg (1000 units) tablet Take 1 tablet (25 mcg) by mouth daily 100 tablet 3     Review of Systems   Constitutional, HEENT, cardiovascular, pulmonary, gi and gu systems are negative, except as otherwise noted.      Objective            Vitals:  No vitals were obtained today due to virtual visit.    Physical Exam     GENERAL: Healthy, alert and no distress  EYES: Eyes grossly normal to inspection.  No discharge or erythema, or obvious scleral/conjunctival abnormalities.  RESP: No audible wheeze, cough, or visible cyanosis.  No visible retractions or increased work of breathing.    SKIN: Visible skin clear. No significant rash, abnormal pigmentation or lesions.  NEURO: Cranial nerves grossly intact.  PSYCH: Mentation appears interactive but quiet,  slow speech and appearance well-groomed.          Assessment & Plan     Lisa presents today with her daughter who is her primary caregiver for follow-up of primary care after moving to [place].  Her conditions are stabilizing including her diabetes also follows with endocrinology.  Hypertension is under excellent control recently establish care with cardiology who suggested possibility of lowering dose of hydrochlorothiazide therefore will have her cut in half for 1 week monitoring blood pressure goal less than 140/90 and if stable will have her discontinue hydrochlorothiazide and continue to monitor for blood pressure less than 140/90.  Maria Guadalupe will connect with me to inform me that she has been successfully able to discontinue hydrochlorothiazide and then we will contact her pharmacy to update.  I discussed vaccines per CDC website for Covid 19 updates, stay tuned to availability for COVID-19 vaccine for community members.  I am aware she is very interested in obtaining the COVID-19 vaccine I would suggest her daughter obtain it around the same time as she does.  I also reviewed that they will need to continue with pandemic guidelines wearing mask handwashing social distancing even after vaccinating.  I will review her health records to determine when her last colonoscopy was as I did receive a shipment of her records but have not yet been able to review in detail.  She had some trouble  swallowing all pills in the morning but no other swallow concerns therefore move vitamins to lunch.  I recommended to follow-up in 3 months may be virtual.    Isabell was seen today for imm/inj and throat problem.    Diagnoses and all orders for this visit:    Type 1 diabetes mellitus with other circulatory complication (H)  -     Hemoglobin A1c; Future    Other osteoarthritis of spine, unspecified spinal region  Neuropathy  -     pregabalin (LYRICA) 100 MG capsule; Take 1 capsule (100 mg) by mouth 2 times daily first fill 1/1/2021    Acquired hypothyroidism  -     TSH with free T4 reflex; Future    Visit for screening mammogram  -     Mammogram, screening w zenon (3D); Future  Sometime in next year  Benign essential hypertension    See above        See patient instructions    Return in about 3 months (around 3/16/2021) for follow-up.    Bony Castaneda MD  [facility]      Video-Visit Details    Type of service:  Video Visit    Video  Time:11:03- 11:33 AM 30 min    Originating Location (pt. Location): Home    Distant Location (provider location):  [facility]     Platform used for Video Visit: [employer]

## 2020-12-16 NOTE — PATIENT INSTRUCTIONS
Schedule labs when able via visit sometime after January 1.  Taper hydrochlorothiazide to one half tab daily for one week goal BP less than 140/90 if stable then discontinue and if BP remains less than 140/90 contact me to discontinue hydrochlorothiazide RX at your pharmacy  Follow-up with me in 3 months earlier if concerns.  Mammogram sometime in 2021

## 2020-12-18 ENCOUNTER — VIRTUAL VISIT (OUTPATIENT)
Dept: UROLOGY | Facility: CLINIC | Age: 60
End: 2020-12-18
Payer: MEDICARE

## 2020-12-18 DIAGNOSIS — Z53.9 ERRONEOUS ENCOUNTER--DISREGARD: Primary | ICD-10-CM

## 2020-12-18 NOTE — PHARMACY-VANCOMYCIN DOSING SERVICE
Pharmacy Vancomycin Initial Note  Date of Service 2020  Patient's  1960  60 year old, female    Indication: Sepsis    Current estimated CrCl = CrCl cannot be calculated (Unknown ideal weight.).    Creatinine for last 3 days  2020:  4:01 PM Creatinine 2.55 mg/dL    Recent Vancomycin Level(s) for last 3 days  No results found for requested labs within last 72 hours.      Vancomycin IV Administrations (past 72 hours)      No vancomycin orders with administrations in past 72 hours.                Nephrotoxins and other renal medications (From now, onward)    Start     Dose/Rate Route Frequency Ordered Stop    20 1750  vancomycin (VANCOCIN) 1,750 mg in sodium chloride 0.9 % 500 mL intermittent infusion      1,750 mg  over 2 Hours Intravenous ONCE 20 1746      20 1746  vancomycin place james - receiving intermittent dosing      1 each Intravenous SEE ADMIN INSTRUCTIONS 20 17420 1650  piperacillin-tazobactam (ZOSYN) 3.375 g vial to attach to  mL bag      3.375 g  over 30 Minutes Intravenous ONCE 20 1646            Contrast Orders - past 72 hours (72h ago, onward)    None                Plan:  1.  Give vancomycin 1750mg IV x1 now. Will dose intermittently based on levels for now given elevated SCr on admit.   2.  Goal Trough Level: 15-20 mg/L   3.  Pharmacy will check trough levels as appropriate in 1-3 Days.    4. Serum creatinine levels will be ordered daily for the first week of therapy and at least twice weekly for subsequent weeks.    5. [place] method utilized to dose vancomycin therapy: Method 2    Mian Caldera, PharmD, BCPS      
PRE-OP DIAGNOSIS:  Small bowel diverticular disease 18-Dec-2020 22:43:11  Daly Stone

## 2020-12-18 NOTE — LETTER
Date:January 22, 2021      Provider requested that no letter be sent. Do not send.       [facility] Health Information

## 2020-12-18 NOTE — LETTER
12/18/2020       RE: Isabell Matthews  [address]  [place] [address]     Dear Colleague,    Thank you for referring your patient, Isabell Matthews, to the [facility] UROLOGY CLINIC [place] at [facility]. Please see a copy of my visit note below.      This encounter was opened in error. Please disregard.      Again, thank you for allowing me to participate in the care of your patient.      Sincerely,    aTy Briones MD

## 2020-12-18 NOTE — PROGRESS NOTES
"Video Visit Technology for this patient: {[facility]:483760}    Isabell Matthews is a 60 year old female who is being evaluated via a billable video visit.      The patient has been notified of following:     \"This video visit will be conducted via a call between you and your physician/provider. We have found that certain health care needs can be provided without the need for an in-person physical exam.  This service lets us provide the care you need with a video conversation.  If a prescription is necessary we can send it directly to your pharmacy.  If lab work is needed we can place an order for that and you can then stop by our lab to have the test done at a later time.    Video visits are billed at different rates depending on your insurance coverage.  Please reach out to your insurance provider with any questions.    If during the course of the call the physician/provider feels a video visit is not appropriate, you will not be charged for this service.\"    Patient has given verbal consent for Video visit? {YES-NO  Default Yes:4444::\"Yes\"}  How would you like to obtain your AVS? {AVS Preference:143268}  If you are dropped from the video visit, the video invite should be resent to: {video visit invitation:970641}  Will anyone else be joining your video visit? {:576973}  {If patient encounters technical issues they should call [phone] :891072}      Video-Visit Details    Type of service:  Video Visit    Video Start Time: {video visit start/end time:152948}  Video End Time: {video visit start/end time:152948}    Originating Location (pt. Location): {video visit patient location:493164::\"Home\"}    Distant Location (provider location):  [facility] UROLOGY [facility]     Platform used for Video Visit: {Virtual Visit Platforms:708983::\"Ring\"}    {signature options:579968}        "

## 2020-12-18 NOTE — NURSING NOTE
.  Chief Complaint   Patient presents with     RECHECK     Discuss MRI    - Elvia Miranda,   EMT Clinic Support

## 2021-01-04 ENCOUNTER — HEALTH MAINTENANCE LETTER (OUTPATIENT)
Age: 61
End: 2021-01-04

## 2021-02-15 ENCOUNTER — TELEPHONE (OUTPATIENT)
Dept: FAMILY MEDICINE | Facility: CLINIC | Age: 61
End: 2021-02-15

## 2021-02-15 DIAGNOSIS — M47.899 OTHER OSTEOARTHRITIS OF SPINE, UNSPECIFIED SPINAL REGION: ICD-10-CM

## 2021-02-15 DIAGNOSIS — G62.9 NEUROPATHY: ICD-10-CM

## 2021-02-15 NOTE — TELEPHONE ENCOUNTER
Health Call Center    Phone Message    May a detailed message be left on voicemail: yes     Reason for Call: Medication Refill Request    Has the patient contacted the pharmacy for the refill? Yes   Name of medication being requested: pregabalin (LYRICA) 100 MG capsule  Provider who prescribed the medication: [facility]   Pharmacy [id number] - [place], [place] - [address] AT [facility]  Date medication is needed: ASAP     Patient has been out for over a week. Please follow up asap. Pharmacy sent last week. Thank you!           Action Taken: Message routed to:  [facility] p[facility]    Travel Screening: Not Applicable                      2-16-21 script signed  Elif Will MD, PhD

## 2021-02-16 RX ORDER — PREGABALIN 100 MG/1
100 CAPSULE ORAL 2 TIMES DAILY
Qty: 60 CAPSULE | Refills: 0 | Status: SHIPPED | OUTPATIENT
Start: 2021-02-16 | End: 2021-02-25

## 2021-02-25 ENCOUNTER — VIRTUAL VISIT (OUTPATIENT)
Dept: FAMILY MEDICINE | Facility: CLINIC | Age: 61
End: 2021-02-25
Payer: MEDICARE

## 2021-02-25 DIAGNOSIS — N18.32 STAGE 3B CHRONIC KIDNEY DISEASE (H): ICD-10-CM

## 2021-02-25 DIAGNOSIS — F01.50: ICD-10-CM

## 2021-02-25 DIAGNOSIS — E10.59 TYPE 1 DIABETES MELLITUS WITH OTHER CIRCULATORY COMPLICATION (H): Primary | ICD-10-CM

## 2021-02-25 DIAGNOSIS — E78.5 HYPERLIPIDEMIA LDL GOAL <100: ICD-10-CM

## 2021-02-25 DIAGNOSIS — M25.571 PAIN IN JOINT, ANKLE AND FOOT, RIGHT: ICD-10-CM

## 2021-02-25 DIAGNOSIS — G62.9 NEUROPATHY: ICD-10-CM

## 2021-02-25 DIAGNOSIS — Z23 ENCOUNTER FOR IMMUNIZATION: ICD-10-CM

## 2021-02-25 DIAGNOSIS — M47.899 OTHER OSTEOARTHRITIS OF SPINE, UNSPECIFIED SPINAL REGION: ICD-10-CM

## 2021-02-25 DIAGNOSIS — I10 BENIGN ESSENTIAL HYPERTENSION: ICD-10-CM

## 2021-02-25 DIAGNOSIS — E03.9 ACQUIRED HYPOTHYROIDISM: ICD-10-CM

## 2021-02-25 DIAGNOSIS — M79.7 FIBROMYALGIA: ICD-10-CM

## 2021-02-25 PROCEDURE — 99215 OFFICE O/P EST HI 40 MIN: CPT | Mod: 95 | Performed by: FAMILY MEDICINE

## 2021-02-25 RX ORDER — PRENATAL VIT 91/IRON/FOLIC/DHA 28-975-200
COMBINATION PACKAGE (EA) ORAL
Qty: 42 G | Refills: 1 | Status: ON HOLD | OUTPATIENT
Start: 2021-02-25 | End: 2022-05-04

## 2021-02-25 RX ORDER — PREGABALIN 100 MG/1
100 CAPSULE ORAL 2 TIMES DAILY
Qty: 60 CAPSULE | Refills: 0 | Status: SHIPPED | OUTPATIENT
Start: 2021-03-15 | End: 2021-05-24

## 2021-02-25 NOTE — NURSING NOTE
Chief Complaint   Patient presents with     Derm Problem     Pt has a possible rash from Urinary incontinence per caregiver.      Trauma     possible sprianed ankle per patients caregiver.     Video Visit Technology for this patient: Aubree not working, patient has smart device, please try Doximity Video with patient    Hali Sheldon LPN at 7:49 AM on 2/25/2021.

## 2021-02-25 NOTE — PROGRESS NOTES
Isabell is a 60 year old who is being evaluated via a billable video visit.      How would you like to obtain your AVS? Mail a copy  If the video visit is dropped, the invitation should be resent by: Text to cell phone: [phone]  Will anyone else be joining your video visit? Yes: Daughter       Assessment & Plan     Isabell Matthews has a complicated past medical history which includes diabetes insulin requiring with history of ketoacidosis, renal impairment, hypothyroidism, coronary artery disease with MI, hyperlipidemia, hypertension, seizure disorder, vascular dementia degernative arthritis and left  renal mass.  She  moved to the [facility] now living in [place] with daughter Charli  providing most of her cares and serving as her PCA.  We spent much of today's visit talking about COVID-19 vaccine and ways that she may obtain the vaccine indicating patients have to search for the vaccine availability and self schedule.  I provided information after visit summary for Bimal to assist and also to obtain the vaccine herself as her care provider.  She is due for diabetes labs when able hopefully within the next 3 to 6 months.  She had a right ankle sprain which is improving with conservative management declined need for x-rays information was provided in the after visit summary related to range of motion for her ankle.  Intertriginous rash most likely fungal provided to her benefit and cream to apply twice daily if needed.      Pain in joint, ankle and foot, right  Recent concern improving work on range of motion see AVS  - diclofenac (VOLTAREN) 1 % topical gel  Dispense: 150 g; Refill: 1    Intertrigo labialis  May also place uncer breast rash, discontinue when rah resolved or if not assiting.  - terbinafine (LAMISIL) 1 % external cream  Dispense: 42 g; Refill: 1    Neuropathy Fibromyalgia Other osteoarthritis of spine, unspecified spinal region  I sent March fill, may be able to send 2 month fills at a time.  -  pregabalin (LYRICA) 100 MG capsule  Dispense: 60 capsule; Refill: 0    Type 1 diabetes mellitus with other circulatory complication (H)  Due for labs previously ordered  Due for EYE and dental cares  Benign essential hypertension  Recent values reviewed in range    Ischemic vascular dementia (H)  Sable      Stage 3b chronic kidney disease  Stable    Acquired hypothyroidism   refills available at pharmacy     Hyperlipidemia LDL goal <100  due for fasting labs  - Lipid panel reflex to direct LDL Fasting    62 minutes spent on the date of the encounter doing chart review, history, exam, diagnostics review, documentation, counseling and coordination of cares as noted.  Due for labs within next 1-3 months ( include previously ordered)  Please assist in scheduling in person follow-up with me in May         Bony Castaneda MD  [facility] BERYL Whyte is a 60 year old who presents for the following health issues  accompanied by her Daughter Vishal:    RAMILA Matthews has a complicated past medical history which includes diabetes insulin requiring with history of ketoacidosis, renal impairment, hypothyroidism, coronary artery disease with MI, hyperlipidemia, hypertension, seizure disorder, vascular dementia degernative arthritis and left  renal mass.  She  moved to the [facility] now living in [place] with daughter Vishal  providing most of her cares and serving as her PCA.  COVID 19 vaccine  Vishal  is very concerned about obtaining Covid 19 vaccine for her mother and herself due to her mother's complicated past medical history and risk.  I reviewed Lisa would be category 1C and as her care provider Vishal would also be category 1C information was provided and after visit summary and I spent much of today's visit discussing options on how to obtain the vaccine through [facility], [facility] website find my vaccine or local  pharmacies.  Right ankle sprain  Right Ankle sprain one month ago. She was climbing on cat carrier to get something when Charli  was out at the pharmacy fell about one foot landing on the floor twisting her ankle.  She was immediately able to walk on her foot and Charli  found her sitting on the couch with swelling of the lateral aspect of her right ankle.  Eventually she developed mild ecchymoses and. swelling improvement for 2-3 weeks with improvement now. Shortly after the injury for several nights she tried to get out of bed and fell as her ankle hurt therefore Charli  put her mattress on the floor to prevent further injury.  They've also been elevating using ice and an Ace wrap.  They do not think she needs to have an x-ray at this time as she has been able to walk on her ankle and has good range of motion.    Diabetes mellitus.   Blood sugars have been under control average 150-160.  She is due for laboratory monitoring would like to wait until after Covid vaccine if possible.  Incontinence  She has frequency of urination and sometimes will have a large amount of urinary incontinence especially overnight which caused skin breakdown in the groin but also under intertriginous areas of her breast when she had a very large incontinence episode.  They've been using clotrimazole cream which has been effective but not completely wondering if there is an alternative antifungal cream.  Pain management  They're wondering if I can provide Lyrica more than 1 month at a time to have on file at the pharmacy as it is difficult to obtain renewal, they understand this is a controlled medication.  Labs reviewed in EPIC  BP Readings from Last 3 Encounters:   11/12/20 118/75   11/06/20 (!) 140/81   11/02/20 (!) 142/85    Wt Readings from Last 3 Encounters:   11/12/20 78.2 kg (172 lb 6.4 oz)   11/06/20 78.9 kg (174 lb)   11/02/20 78.9 kg (174 lb)                  Patient Active Problem List   Diagnosis     Benign essential  hypertension     Acquired hypothyroidism     Seizures (H)     Hyperlipidemia LDL goal <100     History of insulin dependent diabetes mellitus     Vitamin D deficiency     Vitamin B12 deficiency (non anemic)     Other osteoarthritis of spine, unspecified spinal region     DKA (diabetic ketoacidoses) (H)     Nephrolithiasis     Left renal mass     Mixed stress and urge urinary incontinence     Chest pressure     Coronary atherosclerosis     Dehydration     DM type 1 (diabetes mellitus, type 1) (H)     HTN (hypertension)     Nausea & vomiting     Otitis media of right ear     Diabetic keto-acidosis (H)     Ischemic vascular dementia (H)     Fungal dermatitis     Nail deformity     Past Surgical History:   Procedure Laterality Date     athroplasty hip Bilateral     ,      STENT         Social History     Tobacco Use     Smoking status: Former Smoker     Packs/day: 0.50     Years: 35.00     Pack years: 17.50     Types: Cigarettes     Quit date: 2018     Years since quittin.6     Smokeless tobacco: Never Used   Substance Use Topics     Alcohol use: Not Currently     Family History   Problem Relation Age of Onset     Myocardial Infarction Father      Dementia Paternal Grandmother      Heart Disease Paternal Grandmother          Current Outpatient Medications   Medication Sig Dispense Refill     aspirin (ASA) 81 MG EC tablet Take 1 tablet (81 mg) by mouth daily 100 tablet 3     atorvastatin (LIPITOR) 40 MG tablet Take 1 tablet (40 mg) by mouth daily 90 tablet 3     blood glucose (NO BRAND SPECIFIED) test strip Use to test blood sugar 4-5 times daily or as directed. 400 each 6     carvedilol (COREG) 3.125 MG tablet Take 1 tablet (3.125 mg) by mouth 2 times daily (with meals) 180 tablet 3     clotrimazole (LOTRIMIN) 1 % external cream Apply topically 2 times daily Until rash resolved 60 g 3     cyanocobalamin (VITAMIN B-12) 1000 MCG tablet Take 1 tablet (1,000 mcg) by mouth daily 100 tablet 3     diclofenac  (VOLTAREN) 1 % topical gel Apply 2 g topically 4 times daily as needed for moderate pain 150 g 1     DULoxetine (CYMBALTA) 20 MG capsule Take 1 capsule (20 mg) by mouth daily 90 capsule 3     hydrochlorothiazide (HYDRODIURIL) 25 MG tablet Taper to one half for one week then off if BP less than 140/90 90 tablet 3     insulin aspart (NOVOLOG PEN) 100 UNIT/ML pen Use with meals and at bedtime per sliding scale and carb correction 1 unit for 10 g carbs,  Route: Inject 0-10 Units Subcutaneous 4 times daily (with meals and nightly) Use with meals and at bedtime per sliding scale and carb correction 1 unit for 10 g carbs.  Use 24-48 units per day. 45 mL 0     insulin glargine (LANTUS PEN) 100 UNIT/ML pen Inject 26 Units Subcutaneous At Bedtime 30 mL 11     insulin pen needle (31G X 8 MM) 31G X 8 MM miscellaneous Use 4 pen needles daily or as directed. 300 each 4     levETIRAcetam (KEPPRA) 500 MG tablet Take 1 tablet (500 mg) by mouth 2 times daily 180 tablet 3     loratadine (CLEAR-ATADINE) 10 MG tablet Take 10 mg by mouth daily       Melatonin 10 MG TABS tablet Take 10 mg by mouth At Bedtime       NIFEdipine ER (ADALAT CC) 60 MG 24 hr tablet Take 1 tablet (60 mg) by mouth daily 90 tablet 3     [START ON 3/15/2021] pregabalin (LYRICA) 100 MG capsule Take 1 capsule (100 mg) by mouth 2 times daily 60 capsule 0     SYNTHROID 88 MCG tablet Take 1 tablet (88 mcg) by mouth daily 90 tablet 3     terbinafine (LAMISIL) 1 % external cream Apply topically to gume of rash twice daily if needed 42 g 1     Vitamin D3 (CHOLECALCIFEROL) 25 mcg (1000 units) tablet Take 1 tablet (25 mcg) by mouth daily 100 tablet 3     Allergies   Allergen Reactions     Pollen Extract Headache     Recent Labs   Lab Test 10/01/20  0457 09/30/20  0446 09/29/20  0424 09/29/20  0424 09/27/20  1601 09/27/20  1601   A1C  --   --   --   --   --  11.7*   ALT  --   --   --  19  --  24   CR 1.58* 1.56*   < > 1.80*   < > 2.55*   GFRESTIMATED 35* 35*   < > 30*   < >  19*   GFRESTBLACK 40* 41*   < > 34*   < > 23*   POTASSIUM 3.5 3.8   < > 3.4   < > 4.4   TSH  --   --   --   --   --  0.65    < > = values in this interval not displayed.            Review of Systems         Objective           Vitals:  No vitals were obtained today due to virtual visit.    Physical Exam     GENERAL: Healthy, alert and no distress  EYES: Eyes grossly normal to inspection.  No discharge or erythema, or obvious scleral/conjunctival abnormalities.  RESP: No audible wheeze, cough, or visible cyanosis.  No visible retractions or increased work of breathing.    SKIN: Visible skin clear. No significant rash, abnormal pigmentation or lesions.  NEURO: Cranial nerves grossly intact.  PSYCH: Mentation appears interactive but quiet,  slow speech and appearance well-groomed.  Right ankle mild swelling lateral normal range of motion. No other abnormalities noted on foot exam via video              Video-Visit Details    Type of service:  Video Visit    Video Time:7:59 AM-8:33 AM    Originating Location (pt. Location): Home    Distant Location (provider location):  [facility] PRIMARY [facility]     Platform used for Video Visit: Aigou

## 2021-02-25 NOTE — Clinical Note
DUe for labs within next 1-3 months  Please assist in scheduling in person follow-up with me in May  Best wishes,  Bony Castaneda MD

## 2021-02-25 NOTE — Clinical Note
Also Due for EYE and dental cares, etc mammogram see previously ordered referrals  Best wishes,  Bony Castaneda MD

## 2021-02-25 NOTE — PATIENT INSTRUCTIONS
[facility] Medication Refill Request Information:  * Please contact your pharmacy regarding ANY request for medication refills.  ** [facility] Prescription Fax = [phone]  * Please allow 3 business days for routine medication refills.  * Please allow 5 business days for controlled substance medication refills.     [facility] Test Result notification information:  *You will be notified with in 7-10 days of your appointment day regarding the results of your test.  If you are on [employer] you will be notified as soon as the provider has reviewed the results and signed off on them.    [facility]: [phone]   We are working hard to begin vaccinating more people against COVID-19. Currently, we are only vaccinating individuals age 75 and older and Phase 1a workers - healthcare workers who are unable to do their job remotely. Vaccine availability is very limited.    If you are 75 or older, or a healthcare worker who is unable to do your job remotely, please log in to [employer] using this link to see if we have an open appointment and schedule an appointment.  If there are no appointments left, you will be unable to schedule and need to check back later.  If you are a healthcare worker, you will be asked to provide proof of employment at your appointment. If you cannot, you will be turned away.    Vaccine appointments are being added as they become available. Please check your Ori[employer] account frequently for availability. If you have technical difficulty using Oriense, call [phone] for assistance.    You can learn more about the [facility]'s phased approach to administering the vaccine, with details on each phase, [email or web address]/plan.html.      As vaccine supply increases and we are able to open appointments to more groups, we will share that information on our website [email or web address]. Check this website to stay up to date  on COVID-19 vaccination information.     Also go to CDC.gov find my vaccine  Also check local pharmacies  Isabell Matthews is 1C category and Daughter Vishal Peña is 1 C category as care provider.    Patient Education     Treating Ankle Sprains  Treatment will depend on how bad your sprain is. For a severe sprain, healing may take 3 months or more.  Right after your injury: Use R.I.C.E.    BIG: Rest: At first, keep weight off the ankle as much as you can. You may be given crutches to help you walk without putting weight on the ankle.    BIG: Ice: Put an ice pack on the ankle for 20 minutes. Remove the pack and wait at least 30 minutes. Repeat for up to 3 days. This helps reduce swelling.    BIG: Compression: To reduce swelling and keep the joint stable, you may need to wrap the ankle with an elastic bandage. For more severe sprains, you may need an ankle brace, a boot, or a cast.    BIG: Elevation: To reduce swelling, keep your ankle raised above your heart when you sit or lie down.  Medicine  Your healthcare provider may suggest oral nonsteroidal anti-inflammatory medicine (NSAIDs), such as ibuprofen. This relieves the pain and helps reduce swelling. Be sure to take your medicine as directed.  Exercises    After about 2 to 3 weeks, you may be given exercises to strengthen the ligaments and muscles in the ankle. Doing these exercises will help prevent another ankle sprain. Exercises may include standing on your toes and then on your heels and doing ankle curls.    Sit on the edge of a sturdy table or lie on your back.    Pull your toes toward you. Then point them away from you. Repeat for 2 to 3 minutes.  [employer] last reviewed this educational content on 1/1/2018 2000-2020 [employer]. [address] [place] [address], [place] [address]. All rights reserved. This information is not intended as a substitute for professional medical care. Always follow your healthcare professional's  instructions.

## 2021-03-01 ENCOUNTER — TELEPHONE (OUTPATIENT)
Dept: FAMILY MEDICINE | Facility: CLINIC | Age: 61
End: 2021-03-01

## 2021-03-01 NOTE — TELEPHONE ENCOUNTER
Called patient's caregiver to help schedule labs within next 1-3 months as well as an in person follow-up visit with Dr Castaneda in May. No answered. Left voice message with clinic number to call to schedule as well as due for EYE and dental cares, mammogram previously ordered by provider.

## 2021-03-17 ENCOUNTER — IMMUNIZATION (OUTPATIENT)
Dept: NURSING | Facility: CLINIC | Age: 61
End: 2021-03-17
Payer: MEDICARE

## 2021-03-17 PROCEDURE — 91300 PR COVID VAC PFIZER DIL RECON 30 MCG/0.3 ML IM: CPT

## 2021-03-17 PROCEDURE — 0001A PR COVID VAC PFIZER DIL RECON 30 MCG/0.3 ML IM: CPT

## 2021-04-06 ENCOUNTER — IMMUNIZATION (OUTPATIENT)
Dept: NURSING | Facility: CLINIC | Age: 61
End: 2021-04-06
Attending: INTERNAL MEDICINE
Payer: MEDICARE

## 2021-04-06 PROCEDURE — 91300 PR COVID VAC PFIZER DIL RECON 30 MCG/0.3 ML IM: CPT

## 2021-04-06 PROCEDURE — 0002A PR COVID VAC PFIZER DIL RECON 30 MCG/0.3 ML IM: CPT

## 2021-05-01 ENCOUNTER — HEALTH MAINTENANCE LETTER (OUTPATIENT)
Age: 61
End: 2021-05-01

## 2021-05-24 DIAGNOSIS — M47.899 OTHER OSTEOARTHRITIS OF SPINE, UNSPECIFIED SPINAL REGION: ICD-10-CM

## 2021-05-24 DIAGNOSIS — G62.9 NEUROPATHY: ICD-10-CM

## 2021-05-24 NOTE — TELEPHONE ENCOUNTER
[facility] Call Center    Phone Message    May a detailed message be left on voicemail: yes     Reason for Call: Medication Refill Request    Has the patient contacted the pharmacy for the refill? Yes   Name of medication being requested: pregabalin (LYRICA) 100 MG capsule     Provider who prescribed the medication: Bony Castaneda MD  Pharmacy: [id number] - [place], [place] - [address] & [facility]  Date medication is needed: asap     Please review this refill request, the patients daughter express she's been out for a week now, the pharmacy attempted to provide the refill request however the request was lost in translation some how. Please follow up with the patient to address any questions or concerns thank you..      Action Taken: Message routed to:  [facility]    Travel Screening: Not Applicable

## 2021-05-24 NOTE — TELEPHONE ENCOUNTER
pregabalin (LYRICA) 100 MG capsule      Last Written Prescription Date:  3/15/21  Last Fill Quantity: 60,   # refills: 0  Last Office Visit : 2/25/21, recommended in person visit for May  Future Office visit:  None scheduled    Routing refill request to provider for review/approval because:  Drug controlled substance

## 2021-05-25 ENCOUNTER — TELEPHONE (OUTPATIENT)
Dept: FAMILY MEDICINE | Facility: CLINIC | Age: 61
End: 2021-05-25

## 2021-05-25 RX ORDER — PREGABALIN 100 MG/1
100 CAPSULE ORAL 2 TIMES DAILY
Qty: 60 CAPSULE | Refills: 0 | Status: ON HOLD | OUTPATIENT
Start: 2021-05-25 | End: 2021-06-27

## 2021-05-25 NOTE — TELEPHONE ENCOUNTER
Isabell was seen today for refill request.    Diagnoses and all orders for this visit:    Other osteoarthritis of spine, unspecified spinal region  -     pregabalin (LYRICA) 100 MG capsule; Take 1 capsule (100 mg) by mouth 2 times daily For additional refills, please schedule a follow-up appointment at [phone]    Neuropathy  -     pregabalin (LYRICA) 100 MG capsule; Take 1 capsule (100 mg) by mouth 2 times daily For additional refills, please schedule a follow-up appointment at [phone]    Needs to complete labs, needs follow-up none scheduled yet.  Bony Castaneda MD

## 2021-05-28 DIAGNOSIS — I10 BENIGN ESSENTIAL HYPERTENSION: ICD-10-CM

## 2021-05-28 DIAGNOSIS — E78.5 HYPERLIPIDEMIA LDL GOAL <100: ICD-10-CM

## 2021-05-30 RX ORDER — ATORVASTATIN CALCIUM 40 MG/1
40 TABLET, FILM COATED ORAL DAILY
Qty: 90 TABLET | Refills: 3 | OUTPATIENT
Start: 2021-05-30

## 2021-05-30 RX ORDER — HYDROCHLOROTHIAZIDE 25 MG/1
TABLET ORAL
Qty: 90 TABLET | Refills: 3 | OUTPATIENT
Start: 2021-05-30

## 2021-05-30 RX ORDER — CARVEDILOL 3.12 MG/1
3.12 TABLET ORAL 2 TIMES DAILY WITH MEALS
Qty: 180 TABLET | Refills: 3 | OUTPATIENT
Start: 2021-05-30

## 2021-06-21 ENCOUNTER — NURSE TRIAGE (OUTPATIENT)
Dept: NURSING | Facility: CLINIC | Age: 61
End: 2021-06-21

## 2021-06-21 ENCOUNTER — APPOINTMENT (OUTPATIENT)
Dept: CT IMAGING | Facility: CLINIC | Age: 61
DRG: 064 | End: 2021-06-21
Attending: EMERGENCY MEDICINE
Payer: MEDICARE

## 2021-06-21 ENCOUNTER — APPOINTMENT (OUTPATIENT)
Dept: CT IMAGING | Facility: CLINIC | Age: 61
DRG: 064 | End: 2021-06-21
Payer: MEDICARE

## 2021-06-21 ENCOUNTER — HOSPITAL ENCOUNTER (INPATIENT)
Facility: CLINIC | Age: 61
LOS: 6 days | Discharge: NURSING, REHAB OR LONG-TERM CARE | DRG: 064 | End: 2021-06-27
Attending: EMERGENCY MEDICINE | Admitting: PSYCHIATRY & NEUROLOGY
Payer: MEDICARE

## 2021-06-21 DIAGNOSIS — M47.899 OTHER OSTEOARTHRITIS OF SPINE, UNSPECIFIED SPINAL REGION: ICD-10-CM

## 2021-06-21 DIAGNOSIS — E11.10 DIABETIC KETOACIDOSIS WITHOUT COMA ASSOCIATED WITH TYPE 2 DIABETES MELLITUS (H): ICD-10-CM

## 2021-06-21 DIAGNOSIS — I10 BENIGN ESSENTIAL HYPERTENSION: Primary | ICD-10-CM

## 2021-06-21 DIAGNOSIS — I61.9 LEFT-SIDED NONTRAUMATIC INTRACEREBRAL HEMORRHAGE, UNSPECIFIED CEREBRAL LOCATION (H): ICD-10-CM

## 2021-06-21 DIAGNOSIS — Z11.52 ENCOUNTER FOR SCREENING LABORATORY TESTING FOR SEVERE ACUTE RESPIRATORY SYNDROME CORONAVIRUS 2 (SARS-COV-2): ICD-10-CM

## 2021-06-21 DIAGNOSIS — G62.9 NEUROPATHY: ICD-10-CM

## 2021-06-21 DIAGNOSIS — Z86.39 HISTORY OF INSULIN DEPENDENT DIABETES MELLITUS: ICD-10-CM

## 2021-06-21 LAB
ANION GAP SERPL CALCULATED.3IONS-SCNC: 7 MMOL/L (ref 3–14)
APTT PPP: 30 SEC (ref 22–37)
B-HCG FREE SERPL-ACNC: 1 [IU]/L (ref 0.86–1.14)
BASOPHILS # BLD AUTO: 0 10E9/L (ref 0–0.2)
BASOPHILS NFR BLD AUTO: 0.4 %
BUN SERPL-MCNC: 29 MG/DL (ref 7–30)
CALCIUM SERPL-MCNC: 9 MG/DL (ref 8.5–10.1)
CHLORIDE SERPL-SCNC: 101 MMOL/L (ref 94–109)
CO2 SERPL-SCNC: 25 MMOL/L (ref 20–32)
CREAT BLD-MCNC: 1.7 MG/DL (ref 0.52–1.04)
CREAT SERPL-MCNC: 1.56 MG/DL (ref 0.52–1.04)
DIFFERENTIAL METHOD BLD: ABNORMAL
EOSINOPHIL # BLD AUTO: 0 10E9/L (ref 0–0.7)
EOSINOPHIL NFR BLD AUTO: 0.4 %
ERYTHROCYTE [DISTWIDTH] IN BLOOD BY AUTOMATED COUNT: 15.1 % (ref 10–15)
GFR SERPL CREATININE-BSD FRML MDRD: 30 ML/MIN/{1.73_M2}
GFR SERPL CREATININE-BSD FRML MDRD: 35 ML/MIN/{1.73_M2}
GLUCOSE BLDC GLUCOMTR-MCNC: 158 MG/DL (ref 70–99)
GLUCOSE BLDC GLUCOMTR-MCNC: 193 MG/DL (ref 70–99)
GLUCOSE BLDC GLUCOMTR-MCNC: 258 MG/DL (ref 70–99)
GLUCOSE BLDC GLUCOMTR-MCNC: 271 MG/DL (ref 70–99)
GLUCOSE SERPL-MCNC: 250 MG/DL (ref 70–99)
HBA1C MFR BLD: 7.8 % (ref 0–5.6)
HCT VFR BLD AUTO: 41.3 % (ref 35–47)
HGB BLD-MCNC: 13.2 G/DL (ref 11.7–15.7)
IMM GRANULOCYTES # BLD: 0 10E9/L (ref 0–0.4)
IMM GRANULOCYTES NFR BLD: 0.3 %
INR PPP: 0.91 (ref 0.86–1.14)
LABORATORY COMMENT REPORT: NORMAL
LYMPHOCYTES # BLD AUTO: 1.4 10E9/L (ref 0.8–5.3)
LYMPHOCYTES NFR BLD AUTO: 20.1 %
MCH RBC QN AUTO: 27.7 PG (ref 26.5–33)
MCHC RBC AUTO-ENTMCNC: 32 G/DL (ref 31.5–36.5)
MCV RBC AUTO: 87 FL (ref 78–100)
MONOCYTES # BLD AUTO: 0.4 10E9/L (ref 0–1.3)
MONOCYTES NFR BLD AUTO: 5.7 %
MRSA DNA SPEC QL NAA+PROBE: NEGATIVE
NEUTROPHILS # BLD AUTO: 5.1 10E9/L (ref 1.6–8.3)
NEUTROPHILS NFR BLD AUTO: 73.1 %
NRBC # BLD AUTO: 0 10*3/UL
NRBC BLD AUTO-RTO: 0 /100
PLATELET # BLD AUTO: 260 10E9/L (ref 150–450)
POTASSIUM SERPL-SCNC: 3.7 MMOL/L (ref 3.4–5.3)
RADIOLOGIST FLAGS: ABNORMAL
RADIOLOGIST FLAGS: ABNORMAL
RBC # BLD AUTO: 4.77 10E12/L (ref 3.8–5.2)
SARS-COV-2 RNA RESP QL NAA+PROBE: NEGATIVE
SODIUM SERPL-SCNC: 134 MMOL/L (ref 133–144)
SPECIMEN SOURCE: NORMAL
SPECIMEN SOURCE: NORMAL
TROPONIN I SERPL-MCNC: <0.015 UG/L (ref 0–0.04)
WBC # BLD AUTO: 7 10E9/L (ref 4–11)

## 2021-06-21 PROCEDURE — 70450 CT HEAD/BRAIN W/O DYE: CPT | Mod: 26 | Performed by: RADIOLOGY

## 2021-06-21 PROCEDURE — G1004 CDSM NDSC: HCPCS | Mod: GC | Performed by: RADIOLOGY

## 2021-06-21 PROCEDURE — 85610 PROTHROMBIN TIME: CPT | Performed by: EMERGENCY MEDICINE

## 2021-06-21 PROCEDURE — 99207 CT HEAD W/O CONTRAST: CPT | Mod: 26 | Performed by: RADIOLOGY

## 2021-06-21 PROCEDURE — 200N000002 HC R&B ICU UMMC

## 2021-06-21 PROCEDURE — 99291 CRITICAL CARE FIRST HOUR: CPT | Mod: 25 | Performed by: EMERGENCY MEDICINE

## 2021-06-21 PROCEDURE — 87641 MR-STAPH DNA AMP PROBE: CPT | Performed by: STUDENT IN AN ORGANIZED HEALTH CARE EDUCATION/TRAINING PROGRAM

## 2021-06-21 PROCEDURE — 250N000012 HC RX MED GY IP 250 OP 636 PS 637: Performed by: STUDENT IN AN ORGANIZED HEALTH CARE EDUCATION/TRAINING PROGRAM

## 2021-06-21 PROCEDURE — C9803 HOPD COVID-19 SPEC COLLECT: HCPCS | Performed by: EMERGENCY MEDICINE

## 2021-06-21 PROCEDURE — 70498 CT ANGIOGRAPHY NECK: CPT | Mod: 26 | Performed by: RADIOLOGY

## 2021-06-21 PROCEDURE — U0003 INFECTIOUS AGENT DETECTION BY NUCLEIC ACID (DNA OR RNA); SEVERE ACUTE RESPIRATORY SYNDROME CORONAVIRUS 2 (SARS-COV-2) (CORONAVIRUS DISEASE [COVID-19]), AMPLIFIED PROBE TECHNIQUE, MAKING USE OF HIGH THROUGHPUT TECHNOLOGIES AS DESCRIBED BY CMS-2020-01-R: HCPCS | Performed by: EMERGENCY MEDICINE

## 2021-06-21 PROCEDURE — 70496 CT ANGIOGRAPHY HEAD: CPT | Mod: 26 | Performed by: RADIOLOGY

## 2021-06-21 PROCEDURE — 99292 CRITICAL CARE ADDL 30 MIN: CPT | Mod: 25 | Performed by: EMERGENCY MEDICINE

## 2021-06-21 PROCEDURE — 83036 HEMOGLOBIN GLYCOSYLATED A1C: CPT | Performed by: STUDENT IN AN ORGANIZED HEALTH CARE EDUCATION/TRAINING PROGRAM

## 2021-06-21 PROCEDURE — 80048 BASIC METABOLIC PNL TOTAL CA: CPT | Performed by: EMERGENCY MEDICINE

## 2021-06-21 PROCEDURE — 84484 ASSAY OF TROPONIN QUANT: CPT | Performed by: EMERGENCY MEDICINE

## 2021-06-21 PROCEDURE — 70450 CT HEAD/BRAIN W/O DYE: CPT | Mod: MG

## 2021-06-21 PROCEDURE — 250N000011 HC RX IP 250 OP 636: Performed by: RADIOLOGY

## 2021-06-21 PROCEDURE — U0005 INFEC AGEN DETEC AMPLI PROBE: HCPCS | Performed by: EMERGENCY MEDICINE

## 2021-06-21 PROCEDURE — 85025 COMPLETE CBC W/AUTO DIFF WBC: CPT | Performed by: EMERGENCY MEDICINE

## 2021-06-21 PROCEDURE — 93010 ELECTROCARDIOGRAM REPORT: CPT | Performed by: EMERGENCY MEDICINE

## 2021-06-21 PROCEDURE — 93005 ELECTROCARDIOGRAM TRACING: CPT | Performed by: EMERGENCY MEDICINE

## 2021-06-21 PROCEDURE — 36415 COLL VENOUS BLD VENIPUNCTURE: CPT | Performed by: STUDENT IN AN ORGANIZED HEALTH CARE EDUCATION/TRAINING PROGRAM

## 2021-06-21 PROCEDURE — 70450 CT HEAD/BRAIN W/O DYE: CPT | Mod: ME,77

## 2021-06-21 PROCEDURE — 82565 ASSAY OF CREATININE: CPT

## 2021-06-21 PROCEDURE — 85610 PROTHROMBIN TIME: CPT

## 2021-06-21 PROCEDURE — 999N000175 HC STATISTIC STROKE CODE W/ ACCESS

## 2021-06-21 PROCEDURE — 250N000011 HC RX IP 250 OP 636: Performed by: STUDENT IN AN ORGANIZED HEALTH CARE EDUCATION/TRAINING PROGRAM

## 2021-06-21 PROCEDURE — 87640 STAPH A DNA AMP PROBE: CPT | Performed by: STUDENT IN AN ORGANIZED HEALTH CARE EDUCATION/TRAINING PROGRAM

## 2021-06-21 PROCEDURE — 93005 ELECTROCARDIOGRAM TRACING: CPT

## 2021-06-21 PROCEDURE — 999N001017 HC STATISTIC GLUCOSE BY METER IP

## 2021-06-21 PROCEDURE — 93010 ELECTROCARDIOGRAM REPORT: CPT | Performed by: INTERNAL MEDICINE

## 2021-06-21 PROCEDURE — 70496 CT ANGIOGRAPHY HEAD: CPT | Mod: MG

## 2021-06-21 PROCEDURE — 85730 THROMBOPLASTIN TIME PARTIAL: CPT | Performed by: EMERGENCY MEDICINE

## 2021-06-21 RX ORDER — ONDANSETRON 4 MG/1
4 TABLET, ORALLY DISINTEGRATING ORAL EVERY 6 HOURS PRN
Status: DISCONTINUED | OUTPATIENT
Start: 2021-06-21 | End: 2021-06-27 | Stop reason: HOSPADM

## 2021-06-21 RX ORDER — CARVEDILOL 3.12 MG/1
3.12 TABLET ORAL 2 TIMES DAILY WITH MEALS
Status: DISCONTINUED | OUTPATIENT
Start: 2021-06-21 | End: 2021-06-21

## 2021-06-21 RX ORDER — NALOXONE HYDROCHLORIDE 0.4 MG/ML
0.2 INJECTION, SOLUTION INTRAMUSCULAR; INTRAVENOUS; SUBCUTANEOUS
Status: DISCONTINUED | OUTPATIENT
Start: 2021-06-21 | End: 2021-06-25

## 2021-06-21 RX ORDER — VITAMIN B COMPLEX
25 TABLET ORAL DAILY
Status: DISCONTINUED | OUTPATIENT
Start: 2021-06-22 | End: 2021-06-27 | Stop reason: HOSPADM

## 2021-06-21 RX ORDER — IOPAMIDOL 755 MG/ML
75 INJECTION, SOLUTION INTRAVASCULAR ONCE
Status: COMPLETED | OUTPATIENT
Start: 2021-06-21 | End: 2021-06-21

## 2021-06-21 RX ORDER — FENTANYL CITRATE 50 UG/ML
25-50 INJECTION, SOLUTION INTRAMUSCULAR; INTRAVENOUS
Status: DISCONTINUED | OUTPATIENT
Start: 2021-06-21 | End: 2021-06-25

## 2021-06-21 RX ORDER — ATORVASTATIN CALCIUM 40 MG/1
40 TABLET, FILM COATED ORAL DAILY
Status: DISCONTINUED | OUTPATIENT
Start: 2021-06-22 | End: 2021-06-27 | Stop reason: HOSPADM

## 2021-06-21 RX ORDER — DEXTROSE MONOHYDRATE 25 G/50ML
25-50 INJECTION, SOLUTION INTRAVENOUS
Status: DISCONTINUED | OUTPATIENT
Start: 2021-06-21 | End: 2021-06-22

## 2021-06-21 RX ORDER — POLYETHYLENE GLYCOL 3350 17 G/17G
17 POWDER, FOR SOLUTION ORAL DAILY
Status: DISCONTINUED | OUTPATIENT
Start: 2021-06-22 | End: 2021-06-27 | Stop reason: HOSPADM

## 2021-06-21 RX ORDER — DULOXETIN HYDROCHLORIDE 20 MG/1
20 CAPSULE, DELAYED RELEASE ORAL DAILY
Status: DISCONTINUED | OUTPATIENT
Start: 2021-06-22 | End: 2021-06-21

## 2021-06-21 RX ORDER — LEVETIRACETAM 5 MG/ML
500 INJECTION INTRAVASCULAR EVERY 12 HOURS
Status: DISCONTINUED | OUTPATIENT
Start: 2021-06-21 | End: 2021-06-22

## 2021-06-21 RX ORDER — DULOXETIN HYDROCHLORIDE 20 MG/1
20 CAPSULE, DELAYED RELEASE ORAL DAILY
Status: DISCONTINUED | OUTPATIENT
Start: 2021-06-22 | End: 2021-06-27 | Stop reason: HOSPADM

## 2021-06-21 RX ORDER — ONDANSETRON 2 MG/ML
4 INJECTION INTRAMUSCULAR; INTRAVENOUS EVERY 6 HOURS PRN
Status: DISCONTINUED | OUTPATIENT
Start: 2021-06-21 | End: 2021-06-27 | Stop reason: HOSPADM

## 2021-06-21 RX ORDER — ATORVASTATIN CALCIUM 40 MG/1
40 TABLET, FILM COATED ORAL DAILY
Status: DISCONTINUED | OUTPATIENT
Start: 2021-06-22 | End: 2021-06-21

## 2021-06-21 RX ORDER — ASPIRIN 81 MG/1
81 TABLET ORAL DAILY
Status: DISCONTINUED | OUTPATIENT
Start: 2021-06-21 | End: 2021-06-21

## 2021-06-21 RX ORDER — LANOLIN ALCOHOL/MO/W.PET/CERES
1000 CREAM (GRAM) TOPICAL DAILY
Status: DISCONTINUED | OUTPATIENT
Start: 2021-06-22 | End: 2021-06-27 | Stop reason: HOSPADM

## 2021-06-21 RX ORDER — NICOTINE POLACRILEX 4 MG
15-30 LOZENGE BUCCAL
Status: DISCONTINUED | OUTPATIENT
Start: 2021-06-21 | End: 2021-06-22

## 2021-06-21 RX ORDER — LEVETIRACETAM 500 MG/1
500 TABLET ORAL 2 TIMES DAILY
Status: DISCONTINUED | OUTPATIENT
Start: 2021-06-21 | End: 2021-06-21

## 2021-06-21 RX ORDER — NALOXONE HYDROCHLORIDE 0.4 MG/ML
0.4 INJECTION, SOLUTION INTRAMUSCULAR; INTRAVENOUS; SUBCUTANEOUS
Status: DISCONTINUED | OUTPATIENT
Start: 2021-06-21 | End: 2021-06-25

## 2021-06-21 RX ORDER — AMOXICILLIN 250 MG
1-2 CAPSULE ORAL 2 TIMES DAILY
Status: DISCONTINUED | OUTPATIENT
Start: 2021-06-21 | End: 2021-06-27 | Stop reason: HOSPADM

## 2021-06-21 RX ORDER — LABETALOL HYDROCHLORIDE 5 MG/ML
10 INJECTION, SOLUTION INTRAVENOUS EVERY 10 MIN PRN
Status: DISCONTINUED | OUTPATIENT
Start: 2021-06-21 | End: 2021-06-25

## 2021-06-21 RX ORDER — LANOLIN ALCOHOL/MO/W.PET/CERES
1000 CREAM (GRAM) TOPICAL DAILY
Status: DISCONTINUED | OUTPATIENT
Start: 2021-06-22 | End: 2021-06-21

## 2021-06-21 RX ORDER — ACETAMINOPHEN 650 MG/1
650 SUPPOSITORY RECTAL EVERY 4 HOURS PRN
Status: DISCONTINUED | OUTPATIENT
Start: 2021-06-21 | End: 2021-06-27 | Stop reason: HOSPADM

## 2021-06-21 RX ORDER — ACETAMINOPHEN 325 MG/1
650 TABLET ORAL EVERY 4 HOURS PRN
Status: DISCONTINUED | OUTPATIENT
Start: 2021-06-21 | End: 2021-06-27 | Stop reason: HOSPADM

## 2021-06-21 RX ORDER — CARVEDILOL 3.12 MG/1
3.12 TABLET ORAL 2 TIMES DAILY WITH MEALS
Status: DISCONTINUED | OUTPATIENT
Start: 2021-06-21 | End: 2021-06-25

## 2021-06-21 RX ORDER — LEVOTHYROXINE SODIUM 88 UG/1
88 TABLET ORAL DAILY
Status: DISCONTINUED | OUTPATIENT
Start: 2021-06-21 | End: 2021-06-27 | Stop reason: HOSPADM

## 2021-06-21 RX ORDER — MAGNESIUM CARB/ALUMINUM HYDROX 105-160MG
148 TABLET,CHEWABLE ORAL
Status: DISCONTINUED | OUTPATIENT
Start: 2021-06-21 | End: 2021-06-27 | Stop reason: HOSPADM

## 2021-06-21 RX ORDER — LORATADINE 10 MG/1
10 TABLET ORAL DAILY
Status: DISCONTINUED | OUTPATIENT
Start: 2021-06-22 | End: 2021-06-27 | Stop reason: HOSPADM

## 2021-06-21 RX ADMIN — INSULIN ASPART 1 UNITS: 100 INJECTION, SOLUTION INTRAVENOUS; SUBCUTANEOUS at 20:45

## 2021-06-21 RX ADMIN — LABETALOL HYDROCHLORIDE 10 MG: 5 INJECTION, SOLUTION INTRAVENOUS at 23:19

## 2021-06-21 RX ADMIN — LEVETIRACETAM 500 MG: 5 INJECTION INTRAVENOUS at 20:35

## 2021-06-21 RX ADMIN — IOPAMIDOL 75 ML: 755 INJECTION, SOLUTION INTRAVENOUS at 16:23

## 2021-06-21 ASSESSMENT — VISUAL ACUITY
OU: NORMAL ACUITY

## 2021-06-21 ASSESSMENT — ACTIVITIES OF DAILY LIVING (ADL): ADLS_ACUITY_SCORE: 25

## 2021-06-21 ASSESSMENT — ENCOUNTER SYMPTOMS
WEAKNESS: 1
FACIAL ASYMMETRY: 1
SPEECH DIFFICULTY: 1

## 2021-06-21 ASSESSMENT — MIFFLIN-ST. JEOR
SCORE: 1283.88
SCORE: 1300.88

## 2021-06-21 NOTE — LETTER
Transition Communication Hand-off for Care Transitions to Next Level of Care Provider    Name: Isabell Matthews  : 1960  MRN #: [id number]  Primary Care Provider: Bony Castaneda     Primary Clinic: [address] [place] [id number]     Reason for Hospitalization:  Left-sided nontraumatic intracerebral hemorrhage, unspecified cerebral location (H) [I61.9]  Admit Date/Time: 2021  4:09 PM  Discharge Date: 21  Payor Source: Payor: MEDICARE / Plan: MEDICARE / Product Type: Medicare /          Reason for Communication Hand-off Referral: Avoidable readmission within 30 days    Discharge Plan:  Transitional Care - [facility] [address] [place], [place]  Admissions: Ale [phone] f: [phone]    Concern for non-adherence with plan of care:   Y/N No  Discharge Needs Assessment:  Needs      Most Recent Value   Anticipated Changes Related to Illness  inability to care for self   Equipment Currently Used at Home  -- [built in shower chair, w/c,  shower nozzle, higher toilet]          Already enrolled in Tele-monitoring program and name of program:  [employer]  Follow-up plan:    Future Appointments   Date Time Provider Department Center   2021  6:00 AM [facility]   2021  6:00 AM [facility]   2021  6:00 AM [facility] PT OVERFLOW [facility] O       Any outstanding tests or procedures:        Radiology & Cardiology Orders     Future Labs/Procedures Complete By Expires    CT Head w/o contrast*  2021 (Approximate) 2021    Process Instructions:    Administration of IV contrast (contrast agent, dose, and amount) will be tailored to this examination per the appropriate written protocol listed in the Protocol E-Book, or by the supervising imaging provider.         Referrals     Future Labs/Procedures    Neurology Adult Referral     Comments:    Okay to follow up in resident clinic in 6-78 weeks          Key  Recommendations:  Please continue to follow for outpatient support.  Thank you!    ANNE MARIE Hammonds

## 2021-06-21 NOTE — ED TRIAGE NOTES
Triage Assessment & Note:    Patient presents with: PT BIBA from [facility]. Last known well 0130 Pt awoke around 1200 today with right side weakness, slurred speech, facial droop.     Home Treatments/Remedies: none    Febrile / Afebrile? Afebrile     Duration of C/o:     Darrick Jones RN  June 21, 2021

## 2021-06-21 NOTE — H&P
[facility]    Stroke Admission Note    Chief Complaint  Facial droop, slurred speech, and right-sided weakness    HPI  Isabell Matthews is a 60 year old female with PMHx of HTN, HLD, DM II, CAD s/p PCI (2004), CKD stage 3, hypothyroidism, degenerative arthritis, vascular dementia and prior ischemic stroke who presents with acute onset of facial droop, slurred speech, and right-sided weakness. Patient was brought in by EMS after her daughter Tamie, who provides 24/7 care, noticed that patient was unsteady. Patient went to sleep at 1:30 AM in her usual state of health. The patient requires assistance with sitting up, pivoting, and standing at baseline, but is usually able to help using both arms. Today when daughter woke patient up at 12:00 noon, she found that the patient was unable to help sit herself up with the right arm. When she got her up to standing, she was visibly unstable and nearly fell over. She also had more difficulty than usual swallowing her pills this morning, and seemed confused by instructions to take them one at a time as she does every day. Daughter explains that patient is somewhat slow at baseline, particularly in the mornings, though the confusion, lethargy, and right-sided weakness was different enough from baseline that she called EMS. She checked BG prior to arrival (133) to rule out hypo/hyperglycemia because patient is a brittle diabetic and does have recent history of hospitalization for DKA. Stroke code was activated on arrival in ED.     She has not had any head trauma, recent illness, or medication changes. She was previously on Brilinta, though this was discontinued some time ago. She takes Keppra for history of seizures thought to be related to multiple lacunar strokes in the past. No shaking or seizure-like activity witnessed today.     TPA Treatment   Not given due to active bleeding.    Endovascular Treatment  Not initiated due to  absence of proximal vessel occlusion    Impression  Intracerebral Hemorrhage    Plan  Acute Hemorrhagic (ICH) Stroke Plan  - ICH Score at admission: 1  - Admit to Neuro ICU  - Neurochecks Q 30 mins x 4 hours, then Q1 hour for 6 hours, then IF stable Q 2 hours  - Systolic BP Goal: < 140  - Avoid hypotonic IV fluids  - Head of bed elevated  - Telemetry, EKG  - Imaging: repeat CT head w/o contrast in 6 hours (at ~10:30 PM)  - Bedside Glucose Monitoring  - Nutrition: NPO until SLP consult due to facial droop and dysarthria  - A1c, Troponin x 3  - PT/OT/SLP  - Euthermia, Euglycemia      Prophylaxis            For VTE Prevention:  - pneumatic compression device    For Acid Suppression:  - GI prophylaxis is not indicated    Code Status  Full Code    During initial physical assessment, the plan of care was discussed and developed with patient and child.  Plan of care includes: admission to Neuro ICU, blood pressure monitoring, and repeat CT scan in 6 hours.    Patient was admitted via [facility] ([place])    The patient will be admitted to the Neuro Critical Care/Stroke team..     The patient was discussed with Stroke Fellow, Dr. [place].  The Stroke Staff is Dr. Rivers.    Maria E Becerra MD  Neurology Resident  [id number]  ___________________________________________________    Nutrition:  Orders Placed This Encounter      NPO for Medical/Clinical Reasons Except for: No Exceptions    Past Medical History   Past Medical History:   Diagnosis Date     Chronic pain      Essential hypertension      Ex-cigarette smoker     quit 7/2018 over 35 years     Hyperlipidemia      Hypothyroid      Insulin-requiring or dependent type II diabetes mellitus (H)      Seizures (H)      Stroke (H)      Vascular dementia (H)      Past Surgical History   Past Surgical History:   Procedure Laterality Date     athroplasty hip Bilateral     2003, 2006     STENT       Medications   Home Meds  Medication Sig Start Date   aspirin (ASA) 81 MG EC  tablet Take 1 tablet (81 mg) by mouth daily 12/13/20   atorvastatin (LIPITOR) 40 MG tablet Take 1 tablet (40 mg) by mouth daily 9/2/20   blood glucose (NO BRAND SPECIFIED) test strip Use to test blood sugar 4-5 times daily or as directed. 11/2/20   carvedilol (COREG) 3.125 MG tablet Take 1 tablet (3.125 mg) by mouth 2 times daily (with meals) 9/2/20   clotrimazole (LOTRIMIN) 1 % external cream Apply topically 2 times daily Until rash resolved 11/2/20   cyanocobalamin (VITAMIN B-12) 1000 MCG tablet Take 1 tablet (1,000 mcg) by mouth daily 9/2/20   diclofenac (VOLTAREN) 1 % topical gel Apply 2 g topically 4 times daily as needed for moderate pain 2/25/21   DULoxetine (CYMBALTA) 20 MG capsule Take 1 capsule (20 mg) by mouth daily 11/2/20   hydrochlorothiazide (HYDRODIURIL) 25 MG tablet Taper to one half for one week then off if BP less than 140/90 12/16/20   insulin aspart (NOVOLOG PEN) 100 UNIT/ML pen Use with meals and at bedtime per sliding scale and carb correction 1 unit for 10 g carbs,  Route: Inject 0-10 Units Subcutaneous 4 times daily (with meals and nightly) Use with meals and at bedtime per sliding scale and carb correction 1 unit for 10 g carbs.  Use 24-48 units per day. 12/7/20   insulin glargine (LANTUS PEN) 100 UNIT/ML pen Inject 26 Units Subcutaneous At Bedtime 10/12/20   insulin pen needle (31G X 8 MM) 31G X 8 MM miscellaneous Use 4 pen needles daily or as directed. 9/2/20   levETIRAcetam (KEPPRA) 500 MG tablet Take 1 tablet (500 mg) by mouth 2 times daily 9/2/20   loratadine (CLEAR-ATADINE) 10 MG tablet Take 10 mg by mouth daily    Melatonin 10 MG TABS tablet Take 10 mg by mouth At Bedtime    NIFEdipine ER (ADALAT CC) 60 MG 24 hr tablet Take 1 tablet (60 mg) by mouth daily 9/2/20   pregabalin (LYRICA) 100 MG capsule Take 1 capsule (100 mg) by mouth 2 times daily For additional refills, please schedule a follow-up appointment at [phone] 5/25/21   SYNTHROID 88 MCG tablet Take 1 tablet (88 mcg) by  mouth daily 20   terbinafine (LAMISIL) 1 % external cream Apply topically to gume of rash twice daily if needed 21   Vitamin D3 (CHOLECALCIFEROL) 25 mcg (1000 units) tablet Take 1 tablet (25 mcg) by mouth daily 20     Allergies   Allergies   Allergen Reactions     Pollen Extract Headache     Family History   Family History   Problem Relation Age of Onset     Myocardial Infarction Father      Dementia Paternal Grandmother      Heart Disease Paternal Grandmother      Social History   Social History     Tobacco Use     Smoking status: Former Smoker     Packs/day: 0.50     Years: 35.00     Pack years: 17.50     Types: Cigarettes     Quit date: 2018     Years since quittin.9     Smokeless tobacco: Never Used   Substance Use Topics     Alcohol use: Not Currently     Drug use: Not Currently       Review of Systems   Review of systems not obtained due to patient factors - mental status       PHYSICAL EXAMINATION  Temp:  [97.8  F (36.6  C)] 97.8  F (36.6  C)  Pulse:  [116] 116  Resp:  [16] 16  BP: (131)/(67) 131/67  SpO2:  [96 %] 96 %    General:  patient lying in bed without any acute distress    HEENT:  normocephalic/atraumatic, no oral lesions  Cardio:  RRR  Pulmonary:  no respiratory distress  Abdomen:  soft, non-tender, non-distended  Extremities:  no edema  Skin:  intact, warm/dry     Neurologic  Mental Status:  alert, oriented to self and place, not month. Follows commands. Language is coherent with intact comprehension, notably slow.   Cranial Nerves: PERRL, does not bury sclera to the right, facial sensation intact and symmetric, prominent right lower facial droop, hearing not formally tested but intact to conversation, palate elevation symmetric and uvula midline, hypophonic and dysarthric, tongue protrusion midline  Motor:  normal muscle tone and bulk, no abnormal movements, able to move all limbs spontaneously, pronator drift present in RUE, slight drift in bilateral lower  extremities.  Reflexes:  3+ biceps, brachioradialis, triceps, patellar, and achilles on the right, 2+ with cross adductor present on the left. Toes upgoing bilaterally.   Sensory:  light touch sensation intact and symmetric throughout upper and lower extremities, no extinction on double simultaneous stimulation of bilateral upper extremities, extinction present on RLE with double simultaneous stimulation of bilateral lower extremities.   Coordination:  normal finger-to-nose and toe-to-finger on LUE and LLE without dysmetria, RUE difficult to assess due to weakness and RLE with significant ataxia.   Station/Gait:  deferred    Dysphagia Screen  Dysarthria or facial droop present - Maintain NPO, consult SLP    Modified Timothy Scale (pre-stroke):   4-Moderately severe disability; unable to walk without assistance and unable to attend to own bodily     Stroke Scales    NIHSS  Interval baseline (06/21/21 1626)   Interval Comments     1a. Level of Consciousness 1-->Not alert, but arousable by minor stimulation to obey, answer, or respond   1b. LOC Questions 1-->Answers one question correctly   1c. LOC Commands 0-->Performs both tasks correctly   2.   Best Gaze 1-->Partial gaze palsy, gaze is abnormal in one or both eyes, but forced deviation or total gaze paresis is not present   3.   Visual 1-->Partial hemianopia   4.   Facial Palsy 2-->Partial paralysis (total or near-total paralysis of lower face)   5a. Motor Arm, Left 0-->No drift, limb holds 90 (or 45) degrees for full 10 secs   5b. Motor Arm, Right 2-->Some effort against gravity, limb cannot get to or maintain (if cued) 90 (or 45) degrees, drifts down to bed, but has some effort against gravity   6a. Motor Leg, Left 1-->Drift, leg falls by the end of the 5-sec period but does not hit bed   6b. Motor Leg, right 1-->Drift, leg falls by the end of the 5-sec period but does not hit bed   7.   Limb Ataxia 1-->Present in one limb   8.   Sensory 0-->Normal, no sensory loss    9.   Best Language 1-->Mild-to-moderate aphasia, some obvious loss of fluency or facility of comprehension, without significant limitation on ideas expressed or form of expression. Reduction of speech and/or comprehension, however, makes conversation. . . (see row details)   10. Dysarthria 1-->Mild-to-moderate dysarthria, patient slurs at least some words and, at worst, can be understood with some difficulty   11. Extinction and Inattention  2-->Profound yoselyn-inattention/extinction more than 1 modality   Total 15 (06/21/21 1626)       ICH Score (at arrival)  Scoring Tool Score   Age ? 80 years 1 point No   GCS score  3-4   5-12   13-15   2 point  1 point  0 point GCS 5-12   Hematoma volume, cm 3 ? 30 1 point No   Intraventricular extension 1 point No   Infratentorial location 1 point No   Total 1       Imaging  I personally reviewed all imaging; relevant findings per the HPI.    Lab Results Data   CBC  No results for input(s): WBC, RBC, HGB, HCT, PLT in the last 168 hours.  Basic Metabolic Panel   No results for input(s): NA, POTASSIUM, CHLORIDE, CO2, BUN, CR, GLC, VERONICA in the last 168 hours.  Liver Panel  No results for input(s): PROTTOTAL, ALBUMIN, BILITOTAL, ALKPHOS, AST, ALT, BILIDIRECT in the last 168 hours.  INR    Recent Labs   Lab Test 09/27/20  1601   INR 0.99      Lipid Profile  No lab results found.  A1C    Recent Labs   Lab Test 09/27/20  1601   A1C 11.7*     Troponin I  No results for input(s): TROPI in the last 168 hours.       Stroke Code / Stroke Consult Data Data    Stroke code activated 06/21/21   1607   First stroke provider response 06/21/21   1610   Last known normal 06/21/21   0130   Time of discovery   (or onset of symptoms) 06/21/21   1200   Head CT read by Stroke Neuro Dr/Provider 06/21/21   1628   Was stroke code de-escalated? No

## 2021-06-21 NOTE — PHARMACY
Pharmacy Stroke Code Response  Pharmacist responded as part of the Stroke Code Team activation to patient care area ED.      The Stroke Team determined that the patient was not a candidate for IV thrombolytic therapy and the pharmacy team was dismissed at 1623.    Deann Kerr, LucyD, BCPS

## 2021-06-21 NOTE — TELEPHONE ENCOUNTER
"Triage call. Daughter/caregiver calling.  No CTC on file.  Received verbal permission from patient to speak to daughter.    Daughter thinks her mom had a stroke last night. She called ED to let them know she was going to bring her in, but they transferred her to FNA line. Hx of ischemic vascular dementia.    Caller got back late last night. She attempted to wake patient up about 10am (usually gets up about that time), but patient was still tired, so she let her sleep.  At noon she attempted again, but she still didn't want to get up. She grabbed patient's hands to help her get out of bed, but patient's right hand is \"not working\" and right side of mouth is drooping.      Per protocol, call 911 now.  Caller asked if she could transport patient to ED.  Advised to call 911 so paramedics can evaluate and make sure she is stable first. Caller verbalizes understanding and states she will call 911.     Malia Aguirre RN 06/21/21 3:49 PM  [facility] Nurse Advisor      Reason for Disposition    New neurologic deficit that is present NOW, sudden onset of ANY of the following: * Weakness of the face, arm, or leg on one side of the body* Numbness of the face, arm, or leg on one side of the body* Loss of speech or garbled speech    Protocols used: NEUROLOGIC DEFICIT-A-OH    COVID 19 Nurse Triage Plan/Patient Instructions    Please be aware that novel coronavirus (COVID-19) may be circulating in the community. If you develop symptoms such as fever, cough, or SOB or if you have concerns about the presence of another infection including coronavirus (COVID-19), please contact your health care provider or visit [email or web address].     Disposition/Instructions    Call to EMS/911 recommended. Follow protocol based instructions.     Bring Your Own Device:  Please also bring your smart device(s) (smart phones, tablets, laptops) and their charging cables for your personal use and to communicate with your care team during " your visit.    Thank you for taking steps to prevent the spread of this virus.  o Limit your contact with others.  o Wear a simple mask to cover your cough.  o Wash your hands well and often.    Resources    [facility]: About COVID-19: [email or web address]    CDC: What to Do If You're Sick: www.cdc.gov/coronavirus/2019-ncov/about/steps-when-sick.html    CDC: Ending Home Isolation: www.cdc.gov/coronavirus/2019-ncov/hcp/disposition-in-home-patients.html     CDC: Caring for Someone: www.cdc.gov/coronavirus/2019-ncov/if-you-are-sick/care-for-someone.html     [facility]: Interim Guidance for Hospital Discharge to Home: [email or web address]/coronavirus/hcp/hospdischarge.pdf    [facility] clinical trials (COVID-19 research studies): [email or web address]-clinical-trials     Below are the COVID-19 hotlines at the [place] [facility] ([facility]). Interpreters are available.   o For health questions: Call 158-724-7767 or 1-323.373.2444 (7 a.m. to 7 p.m.)  o For questions about schools and childcare: Call 389-151-8886 or 1-892.145.6466 (7 a.m. to 7 p.m.)

## 2021-06-21 NOTE — PROGRESS NOTES
Stroke Code Nurse-Responder Note    Arrival Time to Stroke Code: 1616    Stroke Code Team interventions:   - Brain hemorrhage interventions    ED/Bedside Nurse providing handoff: Darrick Jones    Time left for CT: 1618    Time arrived to next location (ED/Unit/IR): 1628    ED/Bedside Nurse given handoff (name/time): Alfonso Tejada RN

## 2021-06-22 ENCOUNTER — APPOINTMENT (OUTPATIENT)
Dept: CT IMAGING | Facility: CLINIC | Age: 61
DRG: 064 | End: 2021-06-22
Attending: STUDENT IN AN ORGANIZED HEALTH CARE EDUCATION/TRAINING PROGRAM
Payer: MEDICARE

## 2021-06-22 ENCOUNTER — APPOINTMENT (OUTPATIENT)
Dept: OCCUPATIONAL THERAPY | Facility: CLINIC | Age: 61
DRG: 064 | End: 2021-06-22
Payer: MEDICARE

## 2021-06-22 ENCOUNTER — APPOINTMENT (OUTPATIENT)
Dept: SPEECH THERAPY | Facility: CLINIC | Age: 61
DRG: 064 | End: 2021-06-22
Payer: MEDICARE

## 2021-06-22 ENCOUNTER — APPOINTMENT (OUTPATIENT)
Dept: MRI IMAGING | Facility: CLINIC | Age: 61
DRG: 064 | End: 2021-06-22
Attending: STUDENT IN AN ORGANIZED HEALTH CARE EDUCATION/TRAINING PROGRAM
Payer: MEDICARE

## 2021-06-22 ENCOUNTER — APPOINTMENT (OUTPATIENT)
Dept: PHYSICAL THERAPY | Facility: CLINIC | Age: 61
DRG: 064 | End: 2021-06-22
Payer: MEDICARE

## 2021-06-22 LAB
ANION GAP SERPL CALCULATED.3IONS-SCNC: 6 MMOL/L (ref 3–14)
BUN SERPL-MCNC: 24 MG/DL (ref 7–30)
CALCIUM SERPL-MCNC: 8.8 MG/DL (ref 8.5–10.1)
CHLORIDE SERPL-SCNC: 105 MMOL/L (ref 94–109)
CO2 SERPL-SCNC: 28 MMOL/L (ref 20–32)
CREAT SERPL-MCNC: 1.56 MG/DL (ref 0.52–1.04)
ERYTHROCYTE [DISTWIDTH] IN BLOOD BY AUTOMATED COUNT: 15.3 % (ref 10–15)
GFR SERPL CREATININE-BSD FRML MDRD: 35 ML/MIN/{1.73_M2}
GLUCOSE BLDC GLUCOMTR-MCNC: 111 MG/DL (ref 70–99)
GLUCOSE BLDC GLUCOMTR-MCNC: 152 MG/DL (ref 70–99)
GLUCOSE BLDC GLUCOMTR-MCNC: 195 MG/DL (ref 70–99)
GLUCOSE BLDC GLUCOMTR-MCNC: 265 MG/DL (ref 70–99)
GLUCOSE BLDC GLUCOMTR-MCNC: 319 MG/DL (ref 70–99)
GLUCOSE BLDC GLUCOMTR-MCNC: 329 MG/DL (ref 70–99)
GLUCOSE BLDC GLUCOMTR-MCNC: 349 MG/DL (ref 70–99)
GLUCOSE BLDC GLUCOMTR-MCNC: 349 MG/DL (ref 70–99)
GLUCOSE BLDC GLUCOMTR-MCNC: 70 MG/DL (ref 70–99)
GLUCOSE SERPL-MCNC: 171 MG/DL (ref 70–99)
HCT VFR BLD AUTO: 38 % (ref 35–47)
HGB BLD-MCNC: 12 G/DL (ref 11.7–15.7)
INTERPRETATION ECG - MUSE: NORMAL
INTERPRETATION ECG - MUSE: NORMAL
MCH RBC QN AUTO: 27.1 PG (ref 26.5–33)
MCHC RBC AUTO-ENTMCNC: 31.6 G/DL (ref 31.5–36.5)
MCV RBC AUTO: 86 FL (ref 78–100)
PLATELET # BLD AUTO: 250 10E9/L (ref 150–450)
POTASSIUM SERPL-SCNC: 3 MMOL/L (ref 3.4–5.3)
RBC # BLD AUTO: 4.43 10E12/L (ref 3.8–5.2)
SODIUM SERPL-SCNC: 140 MMOL/L (ref 133–144)
WBC # BLD AUTO: 5.6 10E9/L (ref 4–11)

## 2021-06-22 PROCEDURE — G1004 CDSM NDSC: HCPCS | Mod: GC | Performed by: RADIOLOGY

## 2021-06-22 PROCEDURE — 97162 PT EVAL MOD COMPLEX 30 MIN: CPT | Mod: GP

## 2021-06-22 PROCEDURE — 97530 THERAPEUTIC ACTIVITIES: CPT | Mod: GP

## 2021-06-22 PROCEDURE — 36415 COLL VENOUS BLD VENIPUNCTURE: CPT | Performed by: STUDENT IN AN ORGANIZED HEALTH CARE EDUCATION/TRAINING PROGRAM

## 2021-06-22 PROCEDURE — 120N000002 HC R&B MED SURG/OB UMMC

## 2021-06-22 PROCEDURE — 70553 MRI BRAIN STEM W/O & W/DYE: CPT | Mod: MG

## 2021-06-22 PROCEDURE — 250N000011 HC RX IP 250 OP 636: Performed by: STUDENT IN AN ORGANIZED HEALTH CARE EDUCATION/TRAINING PROGRAM

## 2021-06-22 PROCEDURE — 70450 CT HEAD/BRAIN W/O DYE: CPT | Mod: 26 | Performed by: RADIOLOGY

## 2021-06-22 PROCEDURE — 85027 COMPLETE CBC AUTOMATED: CPT | Performed by: STUDENT IN AN ORGANIZED HEALTH CARE EDUCATION/TRAINING PROGRAM

## 2021-06-22 PROCEDURE — 97530 THERAPEUTIC ACTIVITIES: CPT | Mod: GO | Performed by: OCCUPATIONAL THERAPIST

## 2021-06-22 PROCEDURE — 250N000013 HC RX MED GY IP 250 OP 250 PS 637: Performed by: PSYCHIATRY & NEUROLOGY

## 2021-06-22 PROCEDURE — 999N001017 HC STATISTIC GLUCOSE BY METER IP

## 2021-06-22 PROCEDURE — 250N000012 HC RX MED GY IP 250 OP 636 PS 637: Performed by: STUDENT IN AN ORGANIZED HEALTH CARE EDUCATION/TRAINING PROGRAM

## 2021-06-22 PROCEDURE — 92610 EVALUATE SWALLOWING FUNCTION: CPT | Mod: GN

## 2021-06-22 PROCEDURE — 70450 CT HEAD/BRAIN W/O DYE: CPT | Mod: MG

## 2021-06-22 PROCEDURE — 255N000002 HC RX 255 OP 636: Performed by: PSYCHIATRY & NEUROLOGY

## 2021-06-22 PROCEDURE — 250N000013 HC RX MED GY IP 250 OP 250 PS 637: Performed by: STUDENT IN AN ORGANIZED HEALTH CARE EDUCATION/TRAINING PROGRAM

## 2021-06-22 PROCEDURE — 70553 MRI BRAIN STEM W/O & W/DYE: CPT | Mod: 26 | Performed by: RADIOLOGY

## 2021-06-22 PROCEDURE — A9585 GADOBUTROL INJECTION: HCPCS | Performed by: PSYCHIATRY & NEUROLOGY

## 2021-06-22 PROCEDURE — 92526 ORAL FUNCTION THERAPY: CPT | Mod: GN

## 2021-06-22 PROCEDURE — 258N000001 HC RX 258: Performed by: STUDENT IN AN ORGANIZED HEALTH CARE EDUCATION/TRAINING PROGRAM

## 2021-06-22 PROCEDURE — 99291 CRITICAL CARE FIRST HOUR: CPT | Mod: GC | Performed by: PSYCHIATRY & NEUROLOGY

## 2021-06-22 PROCEDURE — 97167 OT EVAL HIGH COMPLEX 60 MIN: CPT | Mod: GO | Performed by: OCCUPATIONAL THERAPIST

## 2021-06-22 PROCEDURE — 80048 BASIC METABOLIC PNL TOTAL CA: CPT | Performed by: STUDENT IN AN ORGANIZED HEALTH CARE EDUCATION/TRAINING PROGRAM

## 2021-06-22 RX ORDER — NICOTINE POLACRILEX 4 MG
15-30 LOZENGE BUCCAL
Status: DISCONTINUED | OUTPATIENT
Start: 2021-06-22 | End: 2021-06-27 | Stop reason: HOSPADM

## 2021-06-22 RX ORDER — POTASSIUM CHLORIDE 1.5 G/1.58G
40 POWDER, FOR SOLUTION ORAL ONCE
Status: COMPLETED | OUTPATIENT
Start: 2021-06-22 | End: 2021-06-22

## 2021-06-22 RX ORDER — GADOBUTROL 604.72 MG/ML
7.5 INJECTION INTRAVENOUS ONCE
Status: COMPLETED | OUTPATIENT
Start: 2021-06-22 | End: 2021-06-22

## 2021-06-22 RX ORDER — LEVETIRACETAM 500 MG/1
500 TABLET ORAL 2 TIMES DAILY
Status: DISCONTINUED | OUTPATIENT
Start: 2021-06-22 | End: 2021-06-27 | Stop reason: HOSPADM

## 2021-06-22 RX ORDER — DEXTROSE MONOHYDRATE 25 G/50ML
25-50 INJECTION, SOLUTION INTRAVENOUS
Status: DISCONTINUED | OUTPATIENT
Start: 2021-06-22 | End: 2021-06-27 | Stop reason: HOSPADM

## 2021-06-22 RX ADMIN — POTASSIUM CHLORIDE 40 MEQ: 1.5 POWDER, FOR SOLUTION ORAL at 18:54

## 2021-06-22 RX ADMIN — PREGABALIN 100 MG: 100 CAPSULE ORAL at 19:42

## 2021-06-22 RX ADMIN — LEVOTHYROXINE SODIUM 88 MCG: 0.09 TABLET ORAL at 09:59

## 2021-06-22 RX ADMIN — INSULIN ASPART 1 UNITS: 100 INJECTION, SOLUTION INTRAVENOUS; SUBCUTANEOUS at 10:04

## 2021-06-22 RX ADMIN — DOCUSATE SODIUM 50 MG AND SENNOSIDES 8.6 MG 1 TABLET: 8.6; 5 TABLET, FILM COATED ORAL at 09:58

## 2021-06-22 RX ADMIN — DULOXETINE HYDROCHLORIDE 20 MG: 20 CAPSULE, DELAYED RELEASE ORAL at 09:59

## 2021-06-22 RX ADMIN — CARVEDILOL 3.12 MG: 3.12 TABLET, FILM COATED ORAL at 09:59

## 2021-06-22 RX ADMIN — PREGABALIN 100 MG: 100 CAPSULE ORAL at 10:03

## 2021-06-22 RX ADMIN — NIFEDIPINE 60 MG: 60 TABLET, EXTENDED RELEASE ORAL at 12:46

## 2021-06-22 RX ADMIN — FENTANYL CITRATE 25 MCG: 50 INJECTION, SOLUTION INTRAMUSCULAR; INTRAVENOUS at 00:09

## 2021-06-22 RX ADMIN — INSULIN ASPART 5 UNITS: 100 INJECTION, SOLUTION INTRAVENOUS; SUBCUTANEOUS at 17:13

## 2021-06-22 RX ADMIN — LABETALOL HYDROCHLORIDE 10 MG: 5 INJECTION, SOLUTION INTRAVENOUS at 02:09

## 2021-06-22 RX ADMIN — DEXTROSE MONOHYDRATE 25 ML: 500 INJECTION PARENTERAL at 03:59

## 2021-06-22 RX ADMIN — DOCUSATE SODIUM 50 MG AND SENNOSIDES 8.6 MG 1 TABLET: 8.6; 5 TABLET, FILM COATED ORAL at 19:42

## 2021-06-22 RX ADMIN — GADOBUTROL 7 ML: 604.72 INJECTION INTRAVENOUS at 16:49

## 2021-06-22 RX ADMIN — INSULIN ASPART 5 UNITS: 100 INJECTION, SOLUTION INTRAVENOUS; SUBCUTANEOUS at 12:38

## 2021-06-22 RX ADMIN — LORATADINE 10 MG: 10 TABLET ORAL at 09:58

## 2021-06-22 RX ADMIN — ATORVASTATIN CALCIUM 40 MG: 40 TABLET, FILM COATED ORAL at 09:58

## 2021-06-22 RX ADMIN — ACETAMINOPHEN 650 MG: 325 TABLET, FILM COATED ORAL at 19:42

## 2021-06-22 RX ADMIN — Medication 25 MCG: at 09:59

## 2021-06-22 RX ADMIN — CYANOCOBALAMIN TAB 1000 MCG 1000 MCG: 1000 TAB at 10:04

## 2021-06-22 RX ADMIN — INSULIN GLARGINE 26 UNITS: 100 INJECTION, SOLUTION SUBCUTANEOUS at 21:57

## 2021-06-22 RX ADMIN — LEVETIRACETAM 500 MG: 5 INJECTION INTRAVENOUS at 09:07

## 2021-06-22 RX ADMIN — CARVEDILOL 3.12 MG: 3.12 TABLET, FILM COATED ORAL at 18:54

## 2021-06-22 RX ADMIN — LEVETIRACETAM 500 MG: 500 TABLET, FILM COATED ORAL at 19:42

## 2021-06-22 ASSESSMENT — VISUAL ACUITY
OU: NORMAL ACUITY

## 2021-06-22 ASSESSMENT — ACTIVITIES OF DAILY LIVING (ADL)
ADLS_ACUITY_SCORE: 24
DOING_ERRANDS_INDEPENDENTLY_DIFFICULTY: YES
WEAR_GLASSES_OR_BLIND: NO
FALL_HISTORY_WITHIN_LAST_SIX_MONTHS: NO
TOILETING_ISSUES: NO
PATIENT_/_FAMILY_COMMUNICATION_STYLE: SPOKEN LANGUAGE (ENGLISH OR BILINGUAL)
DIFFICULTY_EATING/SWALLOWING: NO
HEARING_DIFFICULTY_OR_DEAF: NO
CONCENTRATING,_REMEMBERING_OR_MAKING_DECISIONS_DIFFICULTY: YES
ADLS_ACUITY_SCORE: 24
WEAR_GLASSES_OR_BLIND: NO
PATIENT_/_FAMILY_COMMUNICATION_STYLE: SPOKEN LANGUAGE (ENGLISH OR BILINGUAL)
DRESSING/BATHING_DIFFICULTY: NO
DIFFICULTY_COMMUNICATING: NO
CONCENTRATING,_REMEMBERING_OR_MAKING_DECISIONS_DIFFICULTY: YES
ADLS_ACUITY_SCORE: 24
DOING_ERRANDS_INDEPENDENTLY_DIFFICULTY: YES
DRESSING/BATHING_DIFFICULTY: NO
ADLS_ACUITY_SCORE: 23
FALL_HISTORY_WITHIN_LAST_SIX_MONTHS: NO
ADLS_ACUITY_SCORE: 23
DIFFICULTY_COMMUNICATING: NO
WALKING_OR_CLIMBING_STAIRS_DIFFICULTY: NO
ADLS_ACUITY_SCORE: 23
WALKING_OR_CLIMBING_STAIRS_DIFFICULTY: NO
HEARING_DIFFICULTY_OR_DEAF: NO
TOILETING_ISSUES: NO

## 2021-06-22 NOTE — PLAN OF CARE
Admitted/transferred from: ED to   Reason for admission/transfer: IPH  2 RN skin assessment: completed by Claus BURNS and Kaylyn BATES  Result of skin assessment and interventions/actions: small scratches on right shin, small old bruise on right thigh and RLQ abdomen, will f/unit(s) with daughter about possible fall in past.  Height, weight, drug calc weight: Done  Patient belongings (see Flowsheet) just clothes in bag at bedside.  MDRO education added to care planN/A    Pt with right sided weakness, drift and droop. She is slow to respond and does have slurred quiet speech. NCC MD's notified of pt admission and are at bedside for their evaluation. See flowsheets for more details.   ?

## 2021-06-22 NOTE — PROGRESS NOTES
06/22/21 0957   General Information   Onset of Illness/Injury or Date of Surgery 06/21/21   Referring Physician Maria E Becerra MD   Patient/Family Therapy Goal Statement (SLP) None stated   Pertinent History of Current Problem Isabell Matthews is a 60 year old female with PMHx of HTN, HLD, DM II, CAD s/p PCI (2004), CKD stage 3, hypothyroidism, degenerative arthritis, vascular dementia and prior ischemic stroke who presents with acute onset of facial droop, slurred speech, and right-sided weakness. Patient was brought in by EMS after her daughter Tamie, who provides 24/7 care, noticed that patient was unsteady. Patient went to sleep at 1:30 AM in her usual state of health. The patient requires assistance with sitting up, pivoting, and standing at baseline, but is usually able to help using both arms. Today when daughter woke patient up at 12:00 noon, she found that the patient was unable to help sit herself up with the right arm. When she got her up to standing, she was visibly unstable and nearly fell over. She also had more difficulty than usual swallowing her pills this morning, and seemed confused by instructions to take them one at a time as she does every day. Daughter explains that patient is somewhat slow at baseline, particularly in the mornings, though the confusion, lethargy, and right-sided weakness was different enough from baseline that she called EMS. She checked BG prior to arrival (133) to rule out hypo/hyperglycemia because patient is a brittle diabetic and does have recent history of hospitalization for DKA. Stroke code was activated on arrival in ED. Clinical swallow eval completed per MD orders.    General Observations Swallow eval completed at 9:40AM.    Past History of Dysphagia No hx of dysphagia per chart review   Type of Evaluation   Type of Evaluation Swallow Evaluation   Oral Motor   Oral Musculature anomalies present   Structural Abnormalities none present   Mucosal Quality dry    Dentition (Oral Motor)   Dentition (Oral Motor) some missing teeth;adequate dentition   Facial Symmetry (Oral Motor)   Facial Symmetry (Oral Motor) right side impairment   Left Side Facial Asymmetry moderate impairment   Lip Function (Oral Motor)   Lip Range of Motion (Oral Motor) WNL   Tongue Function (Oral Motor)   Tongue ROM (Oral Motor) lateralization is impaired   Depression, Tongue ROM Impairment (Oral Motor) minimal impairment;right side   Jaw Function (Oral Motor)   Jaw Function (Oral Motor) WNL   Cough/Swallow/Gag Reflex (Oral Motor)   Volitional Throat Clear/Cough (Oral Motor) WNL   Vocal Quality/Secretion Management (Oral Motor)   Vocal Quality (Oral Motor) WFL   General Swallowing Observations   Current Diet/Method of Nutritional Intake (General Swallowing Observations, NIS) NPO   Respiratory Support (General Swallowing Observations) none   Swallowing Evaluation Clinical swallow evaluation   Clinical Swallow Evaluation   Feeding Assistance frequent cues/help required   Clinical Swallow Evaluation Textures Trialed Thin Liquids;Nectar-Thick Liquids;Puree Textures;Semi-Solid   Clinical Swallow Eval: Thin Liquid Texture Trial   Mode of Presentation, Thin Liquids spoon;cup;self-fed;fed by clinician   Volume of Liquid or Food Presented 3 oz   Oral Phase of Swallow Premature pharyngeal entry   Pharyngeal Phase of Swallow impaired;coughing/choking   Diagnostic Statement Thin liquids via spoon and cup resulted in overt s/sx of aspiration marked by throat clearing and coughing   Clinical Swallow Evaluation: Nectar-Thick Liquid Texture Trial   Mode of Presentation, Nectar cup;self-fed   Volume of Nectar Presented 4 oz   Oral Phase, [place] WFL   Pharyngeal Phase, [place] intact   Diagnostic Statement No overt s/sx of aspiration   Clinical Swallow Evaluation: Puree Solid Texture Trial   Mode of Presentation, Puree spoon;fed by clinician   Volume of Puree Presented 3 tbsp   Oral Phase, Puree Poor AP movement    Pharyngeal Phase, Puree intact   Diagnostic Statement No overt s/sx of aspiration   Clinical Swallow Evaluation: Semisolid Texture Trial   Mode of Presentation, Semisolid spoon;fed by clinician   Volume of Semisolid Food Presented 2 tbsp   Oral Phase, Semisolid Poor AP movement;Residue in oral cavity   Oral Residue, Semisolid right lip drooling   Pharyngeal Phase, Semisolid intact   Diagnostic Statement No overt s/sx of aspiration. Pt required prolonged time for mastication with R sided anterior loss of bolus.    Esophageal Phase of Swallow   Patient reports or presents with symptoms of esophageal dysphagia No   Swallowing Recommendations   Diet Consistency Recommendations nectar-thick liquids;full liquid diet   Supervision Level for Intake 1:1 supervision needed   Mode of Delivery Recommendations bolus size, small;food moistened;slow rate of intake   Swallowing Maneuver Recommendations alternate food and liquid intake   Monitoring/Assistance Required (Eating/Swallowing) check mouth frequently for oral residue/pocketing;cue for finger/lingual sweep if oral pocketing present;stop eating activities when fatigue is present;monitor for cough or change in vocal quality with intake;optimize oral intake to minimize need for tube feeding   Recommended Feeding/Eating Techniques (Swallow Eval) maintain upright sitting position for eating;minimize distractions during oral intake;provide assist with feeding;provide 6 smaller meals throughout day;provide oral hygiene prior to intake   Medication Administration Recommendations, Swallowing (SLP) with nectar thick liquids or in small amount of puree   General Therapy Interventions   Planned Therapy Interventions Dysphagia Treatment   Dysphagia treatment Compensatory strategies for swallowing;Instruction of safe swallow strategies;Modified diet education   SLP Therapy Assessment/Plan   Criteria for Skilled Therapeutic Interventions Met (SLP Eval) yes;treatment indicated   SLP  Diagnosis mild-moderate oropharyngeal dysphagia    Rehab Potential (SLP Eval) good, to achieve stated therapy goals   Therapy Frequency (SLP Eval) 5 times/wk   Predicted Duration of Therapy Intervention (SLP Eval) 2 weeks   Comment, Therapy Assessment/Plan (SLP) Clinical swallow eval completed per MD orders. Pt presents with mild-moderate oropharyngeal dysphagia. Pt with mild R-sided facial droop and minimal dysarthria noted. Thin liquids via spoon and cup resulted in overt s/sx of aspiration marked by throat clearing and coughing. Pt tolerated nectar thick liquids, pureed textures, and semisolid textures with no overt s/sx of aspiration. Pt required prolonged time for mastication with semisolid textures which resulted in oral residuals and mild R-sided anterior loss of bolus. Recommend pt initiate full liquids (nectar thick consistency) with 1:1 supervision. Pt should be fully upright and alert for all PO, take small sips/bites, slow pacing, and alternate between consistencies. ST to continue to follow targeting diet tolerance and advancement as appropriate. Anticipate pt will require ST follow up at discharge.    Therapy Plan Review/Discharge Plan (SLP)   Therapy Plan Review (SLP) evaluation/treatment results reviewed;care plan/treatment goals reviewed;risks/benefits reviewed;current/potential barriers reviewed;participants voiced agreement with care plan;participants included;patient   Demonstrates Need for Referral to Another Service (SLP) clinical nutrition services/dietitian;occupational therapist;physical therapist   SLP Discharge Planning    SLP Discharge Recommendation (DC Rec) Transitional Care Facility   SLP Rationale for DC Rec pt below baseline swallow function   SLP Brief overview of current status  Recommend pt initiate full liquids (nectar thick consistency) with 1:1 supervision. Pt should be fully upright and alert for all PO, take small sips/bites, slow pacing, and alternate between consistencies.      Total Evaluation Time   Total Evaluation Time (Minutes) 15

## 2021-06-22 NOTE — PROGRESS NOTES
06/22/21 1100   Quick Adds   Type of Visit Initial PT Evaluation   Living Environment   People in home child(tiffany), adult   Current Living Arrangements apartment   Home Accessibility no concerns   Transportation Anticipated family or friend will provide   Living Environment Comments Pt lives with daughter in apt, no stairs.  Daughter reports A with most ADLs like bathing and dressing.  Daughter is with pt nearly 24hr/day.  Daughter runs errands.    Self-Care   Usual Activity Tolerance moderate   Current Activity Tolerance moderate   Regular Exercise No   Disability/Function   Hearing Difficulty or Deaf other (see comments)  (usually goes to daughter on errand running. )   Difficulty Communicating no   Difficulty Eating/Swallowing no   Walking or Climbing Stairs Difficulty yes   Walking or Climbing Stairs ambulation difficulty, requires equipment   Dressing/Bathing Difficulty yes   Dressing/Bathing bathing difficulty, assistance 1 person   Toileting issues no   Doing Errands Independently Difficulty (such as shopping) yes   Fall history within last six months yes   Number of times patient has fallen within last six months 1   Change in Functional Status Since Onset of Current Illness/Injury yes   General Information   Onset of Illness/Injury or Date of Surgery 06/21/21   Referring Physician  Maria E Becerra MD   Patient/Family Therapy Goals Statement (PT) daughter states she doesnt want pt in facility, would prefer home   Pertinent History of Current Problem (include personal factors and/or comorbidities that impact the POC) 60 year old female with PMHx of HTN, HLD, DM II, CAD s/p PCI (2004), CKD stage 3, hypothyroidism, degenerative arthritis, vascular dementia and prior ischemic stroke who presents with acute onset of facial droop, slurred speech, and right-sided weakness. Patient was brought in by EMS after her daughter Tamie, who provides 24/7 care, noticed that patient was unsteady. Patient went to sleep at 1:30  AM in her usual state of health. The patient requires assistance with sitting up, pivoting, and standing at baseline, but is usually able to help using both arms. Today when daughter woke patient up at 12:00 noon, she found that the patient was unable to help sit herself up with the right arm. When she got her up to standing, she was visibly unstable and nearly fell over. She also had more difficulty than usual swallowing her pills this morning, and seemed confused by instructions to take them one at a time as she does every day. Daughter explains that patient is somewhat slow at baseline, particularly in the mornings, though the confusion, lethargy, and right-sided weakness was different enough from baseline that she called EMS. She checked BG prior to arrival (133) to rule out hypo/hyperglycemia because patient is a brittle diabetic and does have recent history of hospitalization for DKA. Stroke code was activated on arrival in ED. Clinical swallow eval completed per MD orders.    Existing Precautions/Restrictions fall   Cognition   Orientation Status (Cognition) oriented to;person;place;situation   Affect/Mental Status (Cognition) confused   Follows Commands (Cognition) 75-90% accuracy   Safety Deficit (Cognition) minimal deficit   Memory Deficit (Cognition) minimal deficit   Cognitive Status Comments very flat but daughter states this is near baseline   Posture    Posture Kyphosis   Range of Motion (ROM)   ROM Comment WFL   Strength   Strength Comments generalized weakness   Bed Mobility   Comment (Bed Mobility) Lifted from supine to sit   Transfers   Transfer Safety Comments STS with min Ax1    Gait/Stairs (Locomotion)   Comment (Gait/Stairs) amb forward and back 3 ft x3 with min Ax1   Balance   Balance Comments posterior lean requiring min Ax1 during standing and sitting    Clinical Impression   Criteria for Skilled Therapeutic Intervention yes, treatment indicated   PT Diagnosis (PT) impaired functional  mobility   Influenced by the following impairments weakness, impaired cognition, impaired balance   Clinical Presentation Stable/Uncomplicated   Clinical Presentation Rationale clinical judgement    Clinical Decision Making (Complexity) low complexity   Therapy Frequency (PT) 5x/week   Predicted Duration of Therapy Intervention (days/wks) 1 wk   Planned Therapy Interventions (PT) balance training;bed mobility training;gait training;home exercise program;neuromuscular re-education;stair training;strengthening;transfer training   Anticipated Equipment Needs at Discharge (PT) walker, rolling   Risk & Benefits of therapy have been explained evaluation/treatment results reviewed;risks/benefits reviewed;care plan/treatment goals reviewed;current/potential barriers reviewed;participants voiced agreement with care plan;participants included;patient;daughter   PT Discharge Planning    PT Discharge Recommendation (DC Rec) Transitional Care Facility;home;home with assist;home with outpatient physical therapy   PT Rationale for DC Rec Pt is below baseline and would benefit from TCU rehab but daughter states she doesnt want pt to go to facility.  Daughter provides A at baseline 24 hr A.  Will likely refuse TCU and need caregiver education to assess pt safety for home with A from daughter.    PT Brief overview of current status  transfer with A x2   Total Evaluation Time   Total Evaluation Time (Minutes) 10

## 2021-06-22 NOTE — PROGRESS NOTES
[facility]    Stroke Progress Note    Interval Events  NAEO. Isabell was admitted yesterday for right sided weakness, right facial droop and slurred speech. CT at 16:30 showed Left sided basal ganglia intracranial hemorrhage, repeat CT at 22:30 showed a stable lesion. Isabell is more alert today. She is tired and sleeps most of the day which is her baseline. Daughter present and updated at bedside.     Impression  Isabell Matthews is a 60-year-old woman with a PMH of insulin dependent diabetes mellitus, HTN, HLD, CAD (s/p PCI in 2004), CKD III, hypothyroidism, vascular dimentia with previous ischemic strokes associated with seizures. She presented with right sided facial droop, right sided weaknss and slurred speech. She was found to have an intracranial hemorrhage in the left basal ganglia. 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     Plan  Neuro  # Left Basal ganglia hemorraghic stroke, ICH score 1 on admission; etiology likely hypertensiv  - Repeat CT stable  - Neuro checks q2  - SBP< 140  - MRI w and w/o contrast (GFR 35, cr 1.56)  - PT/OT    #Hx of Seizures  - Continue PTA Keppra 500mg BID    #Peripheral neuropathy  - Continue PTA Duloxetine 20mg daily  - Continue PTA Pregabalin 100mg BID    CV  #HTN  #HLD  #CAD s/p PCI (2004)  - Continue PTA atorvastatin 40 mg daily  - Continue PTA hydrochlorothiazide 25mg daily  - Continue PTA carvediol 3.125mg BID  - Continue PTA nifedipine 60mg daily   - Hold PTA aspirin in the setting of acute ICH    Pulm  DAMIAN  -Continuous pulse ox  -O2 goal >92%  -Supplemental O2 to maintain goals    GI  Last BM: last bowel movement  -Bowel regimen: polyethylene glycol and senna-doc  -Diet: cleared for liquid diet, nectar thick     Renal  #Hypokalemia  - Daily BMP  - RN managed electrolyte repletion protocol    Endo  #Insulin dependent diabetes mellitus  - A1C: 7.8  - Medium dose sliding scale  insulin (Aspart)  - continue PTA glargine 26 units at bedtime    #Hypothyroidism  -Continue PTA levothyroxine 88mcg daily     ID  Afebrile, no leukocytosis, no indication for abx.  - Daily CBC  - Trend temperature curve.    Heme  Hgb 12.0, plt 250  - Hgb goal >7, plt goal >100k  - Daily CBC    FEN: Full liquid diet, encourage PO intake, monitor lytes and replace as needed.  Ppx: DVT - pneumatic compression devices, hold chemoppx in the setting of acute ICH; GI - not indicated.  Code: Full.  Dispo: Transfer to floor.    Key Turcios (MS4)    RESIDENT ATTESTATION  I, Maris Andrews, was present with the medical/BULMARO student who participated in the service and in the documentation of the note.  I have verified the history and personally performed the physical exam and medical decision making.  I agree with the assessment and plan of care as documented in the note.      The patient was seen and discussed with the [facility] attending, Dr. Caceres.    Maris Andrews MD  PGY2  Date of Service (when I saw the patient): 06/22/21    ______________________________________________________    Medications   Home Meds  Prior to Admission medications    Medication Sig Start Date End Date Taking? Authorizing Provider   aspirin (ASA) 81 MG EC tablet Take 1 tablet (81 mg) by mouth daily 12/13/20   Bony Castaneda MD   atorvastatin (LIPITOR) 40 MG tablet Take 1 tablet (40 mg) by mouth daily 9/2/20   Bony Castaneda MD   blood glucose (NO BRAND SPECIFIED) test strip Use to test blood sugar 4-5 times daily or as directed. 11/2/20   Bony Castaneda MD   carvedilol (COREG) 3.125 MG tablet Take 1 tablet (3.125 mg) by mouth 2 times daily (with meals) 9/2/20   Bony Castaneda MD   clotrimazole (LOTRIMIN) 1 % external cream Apply topically 2 times daily Until rash resolved 11/2/20   Bony Castaneda MD   cyanocobalamin (VITAMIN B-12) 1000 MCG tablet Take 1 tablet (1,000 mcg) by mouth daily 9/2/20   Bony Castaneda MD    diclofenac (VOLTAREN) 1 % topical gel Apply 2 g topically 4 times daily as needed for moderate pain 2/25/21   Bony Castaneda MD   DULoxetine (CYMBALTA) 20 MG capsule Take 1 capsule (20 mg) by mouth daily 11/2/20   Bony Castaneda MD   hydrochlorothiazide (HYDRODIURIL) 25 MG tablet Taper to one half for one week then off if BP less than 140/90 12/16/20   Bony Castaneda MD   insulin aspart (NOVOLOG PEN) 100 UNIT/ML pen Use with meals and at bedtime per sliding scale and carb correction 1 unit for 10 g carbs,  Route: Inject 0-10 Units Subcutaneous 4 times daily (with meals and nightly) Use with meals and at bedtime per sliding scale and carb correction 1 unit for 10 g carbs.  Use 24-48 units per day. 12/7/20   Bony Castaneda MD   insulin glargine (LANTUS PEN) 100 UNIT/ML pen Inject 26 Units Subcutaneous At Bedtime 10/12/20   Shira Luna PA-C   insulin pen needle (31G X 8 MM) 31G X 8 MM miscellaneous Use 4 pen needles daily or as directed. 9/2/20   Bony Castaneda MD   levETIRAcetam (KEPPRA) 500 MG tablet Take 1 tablet (500 mg) by mouth 2 times daily 9/2/20   Bony Castaneda MD   loratadine (CLEAR-ATADINE) 10 MG tablet Take 10 mg by mouth daily    Reported, Patient   Melatonin 10 MG TABS tablet Take 10 mg by mouth At Bedtime    Unknown, Entered By History   NIFEdipine ER (ADALAT CC) 60 MG 24 hr tablet Take 1 tablet (60 mg) by mouth daily 9/2/20   Bony Castaneda MD   pregabalin (LYRICA) 100 MG capsule Take 1 capsule (100 mg) by mouth 2 times daily For additional refills, please schedule a follow-up appointment at [phone] 5/25/21   Bony Castaneda MD   SYNTHROID 88 MCG tablet Take 1 tablet (88 mcg) by mouth daily 9/2/20   Bony Castaneda MD   terbinafine (LAMISIL) 1 % external cream Apply topically to gume of rash twice daily if needed 2/25/21   Bony Castaneda MD   Vitamin D3 (CHOLECALCIFEROL) 25 mcg (1000 units) tablet Take 1 tablet (25 mcg) by  mouth daily 9/2/20   Bony Castaneda MD       Scheduled Meds    atorvastatin  40 mg Oral or Feeding Tube Daily     carvedilol  3.125 mg Oral or Feeding Tube BID w/meals     cyanocobalamin  1,000 mcg Oral or Feeding Tube Daily     DULoxetine  20 mg Oral or Feeding Tube Daily     insulin aspart  1-6 Units Subcutaneous Q4H     insulin glargine  26 Units Subcutaneous At Bedtime     levETIRAcetam  500 mg Oral or Feeding Tube BID     levothyroxine  88 mcg Oral or Feeding Tube Daily     loratadine  10 mg Oral or Feeding Tube Daily     NIFEdipine ER  60 mg Oral Daily     polyethylene glycol  17 g Oral or Feeding Tube Daily     pregabalin  100 mg Oral or Feeding Tube BID     senna-docusate  1-2 tablet Oral or Feeding Tube BID     Vitamin D3  25 mcg Oral or Feeding Tube Daily       Infusion Meds      PRN Meds  acetaminophen **OR** acetaminophen **OR** acetaminophen, glucose **OR** dextrose **OR** glucagon, fentaNYL, labetalol, magnesium citrate, naloxone **OR** naloxone **OR** naloxone **OR** naloxone, ondansetron **OR** ondansetron       PHYSICAL EXAMINATION  Temp:  [97.7  F (36.5  C)-98.3  F (36.8  C)] 98.3  F (36.8  C)  Pulse:  [] 85  Resp:  [8-18] 10  BP: (108-148)/(56-84) 108/67  SpO2:  [94 %-100 %] 95 %     General:  patient lying in bed without any acute distress    HEENT:  normocephalic/atraumatic  Cardio:  RRR  Pulmonary:  no respiratory distress  Abdomen:  soft, non-tender  Extremities:  no edema  Skin:  warm/dry      Neuro:  MS: alert and oriented to person, place and circumstance. Does not know year, but this is baseline. She follows simple and multi step commands and can repeat phrases. Naming is intact.  CN: Blinks bilaterally to threat, PEERL, EOMI, right lower facial droop. Palate elevates symmetrically, uvula midline, can close both eyes and puff out cheeks. Hearing is intact to conversation. Facial sensation intact.  Motor: 5/5 strength in LUE, LLE, and RLE. There is drift in the RUE, strength is  5-/5.  Sensory: intact to light touch in 4/4 extremities, no extinction.  Reflexes: 2+ and symmetric, no castillo, toes down going.  Gait: deferred       Stroke Scales  ICH score 1 on admit (for GCS 5-12), NIHSS score of 15. Scores today are 0 and   National Institutes [employer] Stroke Scale   Score    Level of consciousness: (1)   Not alert; arousable w/ minor stim to obey/answer/respond    LOC questions: (0)   Answers both questions correctly    LOC commands: (0)   Performs both tasks correctly    Best gaze: (0)   Normal    Visual: (0)   No visual loss    Facial palsy: (1)   Minor paralysis (flat nasolabial fold, smile asymmetry)    Motor arm (left): (0)   No Drift    Motor arm (right): (1)   Drift    Motor leg (left): (0)   No drift    Motor leg (right): (0)   No drift    Limb ataxia: (0)   Absent    Sensory: (0)   Normal- no sensory loss    Best language: (0)   Normal- no aphasia    Dysarthria: (1)   Mild to moderate dysarthria    Extinction and inattention: (0)   No abnormality        Total Score:  4       On admission: 15       ICH Score Tool Patients Score   Age ? 80 years 1 point    GCS score  3-4  5-12   13-15    2 point  1 point  0 point 0   Hematoma volume, cm3 ? 30 1 point 0   Intraventricular extension 1 point 0   Infratentorial location 1 point 0   Patient s ICH Score (0-6) = 0           On admission: 1      Imaging  I personally reviewed all imaging; relevant findings per HPI.     Lab Results Data   CBC  Recent Labs   Lab 06/22/21  0426 06/21/21  1618   WBC 5.6 7.0   RBC 4.43 4.77   HGB 12.0 13.2   HCT 38.0 41.3    260     Basic Metabolic Panel    Recent Labs   Lab 06/22/21  0426 06/21/21  1618    134   POTASSIUM 3.0* 3.7   CHLORIDE 105 101   CO2 28 25   BUN 24 29   CR 1.56* 1.56*   * 250*   VERONICA 8.8 9.0     Liver Panel  No results for input(s): PROTTOTAL, ALBUMIN, BILITOTAL, ALKPHOS, AST, ALT, BILIDIRECT in the last 168 hours.  INR    Recent Labs   Lab Test 06/21/21  1618  09/27/20  1601   INR 0.91 0.99      Lipid Profile  No lab results found.  A1C    Recent Labs   Lab Test 06/21/21 2011 09/27/20  1601   A1C 7.8* 11.7*     Troponin I    Recent Labs   Lab 06/21/21  1618   TROPI <0.015

## 2021-06-22 NOTE — PLAN OF CARE
Major Shift Events  Overnight pt remained in stable condition with issues of HTN, glycemic control, and pain.   - HTN was treated PRN for Sys BP > 140 w/ labetalol 10 mg x2.   - Glycemic control was managed with BS checks q4 resulting in insulin 1 unit given @ 2000 per sliding scale orders. Then, @0350 pt was hypoglycemic (BS=70) D50 syringe 25 mL given, BS was stable on recheck.   - Pain was controlled with Fentanyl 25 mcg x1    Neuro: Q1 Neuros, A&O x2-3 (disorient to situation & time [intermittently]), lethargic/alert, slow slurred speech w/ word finding difficulty, PERRLA (3-4 mm), sensation altered (numbness BUE's [new onset s/p stroke] & bilateral LE's [baseline peripheral neuropathy]), Pronator drift RUE, MSK: R-sided weakness [3/5 RUE/RLE] & [4/5 LUE/LLE], pain +  Cv: afebrile, SR (HR 70-80s), Sys BP < 140 (labetolol x2), pulses +2, ex. Warm, S1/S2  Resp: SpO2 > 96% on 1L NC, LS: clear bilaterally, expansion symmetric w/o retractions, no secretions noted   GI/: NPO, BM -, BS +, Flatus +, Purewick [500 mL out during shift]  Skin: scratches BLE ankles, ecchymosis R-hip     Drips: None  Sedation: None     Plan: Follow POC. Monitor closely, notify MD of acute changes.  For vital signs and complete assessments, please see documentation flowsheets.

## 2021-06-22 NOTE — PROGRESS NOTES
06/22/21 1500   Quick Adds   Type of Visit Initial Occupational Therapy Evaluation   Living Environment   People in home child(tiffany), adult  (Lives with daughter Felton)   Current Living Arrangements apartment   Transportation Anticipated family or friend will provide   Living Environment Comments Living environment info gathered from pt who is a questionable historian. She states she has 4 DINO, no stairs in building, and a walk in shower.    Self-Care   Usual Activity Tolerance moderate   Current Activity Tolerance fair   Regular Exercise No   Equipment Currently Used at Home   (shower chair, grab bars (tub))   Activity/Exercise/Self-Care Comment PLOF info gathered from pt who is a questionable historian. Pt states she is I with toileting, and mostly I with dressing and bathing. Daughter assists with driving, shopping, cooking, cleaning, and medication management.    Disability/Function   Fall history within last six months no   Change in Functional Status Since Onset of Current Illness/Injury yes   General Information   Referring Physician Maria E Becerra MD   Patient/Family Therapy Goal Statement (OT) Return to PLOF   Additional Occupational Profile Info/Pertinent History of Current Problem 60 year old female with PMHx of HTN, HLD, DM II, CAD s/p PCI (2004), CKD stage 3, hypothyroidism, degenerative arthritis, vascular dementia and prior ischemic stroke who presents with acute onset of facial droop, slurred speech, and right-sided weakness.   Existing Precautions/Restrictions fall   Cognitive Status Examination   Cognitive Status Comments Baseline dementia, pt endorses increased confusion this admission.    Visual Perception   Visual Acuity Wears bifocals   Visual Field Deficit   (No concerns)   Visual Motor Impairment   (smooth pursuits intact)   Sensory   Sensory Comments Reports numbness in B UEs   Range of Motion Comprehensive   Comment, General Range of Motion WFL B UEs, although needs VCs for full AROM in  RUE   Strength Comprehensive (MMT)   Comment, General Manual Muscle Testing (MMT) Assessment WFL proximally in B UEs, L hand WFL, R hand 4/5   Coordination   Gross Motor Coordination Impaired R UE   Fine Motor Coordination Impaired R UE   Clinical Impression   Criteria for Skilled Therapeutic Interventions Met (OT) yes   OT Diagnosis Decreased I in ADLs due to deconditioning   Assessment of Occupational Performance 5 or more Performance Deficits   Identified Performance Deficits dressing, bathing, toileting, g/h, functional endurance, cognition   Clinical Decision Making Complexity (OT) high complexity   Therapy Frequency (OT) 6x/week   Predicted Duration of Therapy 2 weeks   Risk & Benefits of therapy have been explained patient   Total Evaluation Time (Minutes)   Total Evaluation Time (Minutes) 8

## 2021-06-23 ENCOUNTER — APPOINTMENT (OUTPATIENT)
Dept: CT IMAGING | Facility: CLINIC | Age: 61
DRG: 064 | End: 2021-06-23
Attending: STUDENT IN AN ORGANIZED HEALTH CARE EDUCATION/TRAINING PROGRAM
Payer: MEDICARE

## 2021-06-23 ENCOUNTER — APPOINTMENT (OUTPATIENT)
Dept: SPEECH THERAPY | Facility: CLINIC | Age: 61
DRG: 064 | End: 2021-06-23
Payer: MEDICARE

## 2021-06-23 ENCOUNTER — APPOINTMENT (OUTPATIENT)
Dept: PHYSICAL THERAPY | Facility: CLINIC | Age: 61
DRG: 064 | End: 2021-06-23
Payer: MEDICARE

## 2021-06-23 ENCOUNTER — APPOINTMENT (OUTPATIENT)
Dept: OCCUPATIONAL THERAPY | Facility: CLINIC | Age: 61
DRG: 064 | End: 2021-06-23
Payer: MEDICARE

## 2021-06-23 LAB
ALBUMIN UR-MCNC: 10 MG/DL
ALBUMIN UR-MCNC: 10 MG/DL
AMORPH CRY #/AREA URNS HPF: ABNORMAL /HPF
ANION GAP SERPL CALCULATED.3IONS-SCNC: 6 MMOL/L (ref 3–14)
APPEARANCE UR: ABNORMAL
APPEARANCE UR: CLEAR
BILIRUB UR QL STRIP: NEGATIVE
BILIRUB UR QL STRIP: NEGATIVE
BUN SERPL-MCNC: 25 MG/DL (ref 7–30)
CALCIUM SERPL-MCNC: 9.3 MG/DL (ref 8.5–10.1)
CHLORIDE SERPL-SCNC: 107 MMOL/L (ref 94–109)
CO2 SERPL-SCNC: 27 MMOL/L (ref 20–32)
COLOR UR AUTO: ABNORMAL
COLOR UR AUTO: YELLOW
CREAT SERPL-MCNC: 1.49 MG/DL (ref 0.52–1.04)
ERYTHROCYTE [DISTWIDTH] IN BLOOD BY AUTOMATED COUNT: 15.3 % (ref 10–15)
GFR SERPL CREATININE-BSD FRML MDRD: 37 ML/MIN/{1.73_M2}
GLUCOSE BLDC GLUCOMTR-MCNC: 112 MG/DL (ref 70–99)
GLUCOSE BLDC GLUCOMTR-MCNC: 131 MG/DL (ref 70–99)
GLUCOSE BLDC GLUCOMTR-MCNC: 161 MG/DL (ref 70–99)
GLUCOSE BLDC GLUCOMTR-MCNC: 162 MG/DL (ref 70–99)
GLUCOSE BLDC GLUCOMTR-MCNC: 379 MG/DL (ref 70–99)
GLUCOSE SERPL-MCNC: 156 MG/DL (ref 70–99)
GLUCOSE UR STRIP-MCNC: 300 MG/DL
GLUCOSE UR STRIP-MCNC: NEGATIVE MG/DL
HCT VFR BLD AUTO: 42.5 % (ref 35–47)
HGB BLD-MCNC: 13.4 G/DL (ref 11.7–15.7)
HGB UR QL STRIP: NEGATIVE
HGB UR QL STRIP: NEGATIVE
KETONES UR STRIP-MCNC: NEGATIVE MG/DL
KETONES UR STRIP-MCNC: NEGATIVE MG/DL
LEUKOCYTE ESTERASE UR QL STRIP: NEGATIVE
LEUKOCYTE ESTERASE UR QL STRIP: NEGATIVE
MAGNESIUM SERPL-MCNC: 2.3 MG/DL (ref 1.6–2.3)
MCH RBC QN AUTO: 27.3 PG (ref 26.5–33)
MCHC RBC AUTO-ENTMCNC: 31.5 G/DL (ref 31.5–36.5)
MCV RBC AUTO: 87 FL (ref 78–100)
MUCOUS THREADS #/AREA URNS LPF: PRESENT /LPF
MUCOUS THREADS #/AREA URNS LPF: PRESENT /LPF
NITRATE UR QL: NEGATIVE
NITRATE UR QL: NEGATIVE
PH UR STRIP: 6 PH (ref 5–7)
PH UR STRIP: 6.5 PH (ref 5–7)
PHOSPHATE SERPL-MCNC: 3.5 MG/DL (ref 2.5–4.5)
PLATELET # BLD AUTO: 254 10E9/L (ref 150–450)
POTASSIUM SERPL-SCNC: 3.2 MMOL/L (ref 3.4–5.3)
RBC # BLD AUTO: 4.91 10E12/L (ref 3.8–5.2)
RBC #/AREA URNS AUTO: 0 /HPF (ref 0–2)
RBC #/AREA URNS AUTO: <1 /HPF (ref 0–2)
SODIUM SERPL-SCNC: 140 MMOL/L (ref 133–144)
SOURCE: ABNORMAL
SOURCE: ABNORMAL
SP GR UR STRIP: 1.01 (ref 1–1.03)
SP GR UR STRIP: 1.02 (ref 1–1.03)
SQUAMOUS #/AREA URNS AUTO: 0 /HPF (ref 0–1)
SQUAMOUS #/AREA URNS AUTO: <1 /HPF (ref 0–1)
UROBILINOGEN UR STRIP-MCNC: NORMAL MG/DL (ref 0–2)
UROBILINOGEN UR STRIP-MCNC: NORMAL MG/DL (ref 0–2)
WBC # BLD AUTO: 5.3 10E9/L (ref 4–11)
WBC #/AREA URNS AUTO: 0 /HPF (ref 0–5)
WBC #/AREA URNS AUTO: 0 /HPF (ref 0–5)

## 2021-06-23 PROCEDURE — 70450 CT HEAD/BRAIN W/O DYE: CPT | Mod: 26 | Performed by: RADIOLOGY

## 2021-06-23 PROCEDURE — 120N000002 HC R&B MED SURG/OB UMMC

## 2021-06-23 PROCEDURE — 81001 URINALYSIS AUTO W/SCOPE: CPT | Performed by: STUDENT IN AN ORGANIZED HEALTH CARE EDUCATION/TRAINING PROGRAM

## 2021-06-23 PROCEDURE — 70450 CT HEAD/BRAIN W/O DYE: CPT | Mod: MG

## 2021-06-23 PROCEDURE — 85027 COMPLETE CBC AUTOMATED: CPT | Performed by: STUDENT IN AN ORGANIZED HEALTH CARE EDUCATION/TRAINING PROGRAM

## 2021-06-23 PROCEDURE — 84100 ASSAY OF PHOSPHORUS: CPT | Performed by: STUDENT IN AN ORGANIZED HEALTH CARE EDUCATION/TRAINING PROGRAM

## 2021-06-23 PROCEDURE — 99232 SBSQ HOSP IP/OBS MODERATE 35: CPT | Mod: GC | Performed by: PSYCHIATRY & NEUROLOGY

## 2021-06-23 PROCEDURE — 83735 ASSAY OF MAGNESIUM: CPT | Performed by: STUDENT IN AN ORGANIZED HEALTH CARE EDUCATION/TRAINING PROGRAM

## 2021-06-23 PROCEDURE — 250N000013 HC RX MED GY IP 250 OP 250 PS 637: Performed by: PSYCHIATRY & NEUROLOGY

## 2021-06-23 PROCEDURE — 36415 COLL VENOUS BLD VENIPUNCTURE: CPT | Performed by: STUDENT IN AN ORGANIZED HEALTH CARE EDUCATION/TRAINING PROGRAM

## 2021-06-23 PROCEDURE — 250N000013 HC RX MED GY IP 250 OP 250 PS 637: Performed by: STUDENT IN AN ORGANIZED HEALTH CARE EDUCATION/TRAINING PROGRAM

## 2021-06-23 PROCEDURE — 97116 GAIT TRAINING THERAPY: CPT | Mod: GP

## 2021-06-23 PROCEDURE — 97535 SELF CARE MNGMENT TRAINING: CPT | Mod: GO

## 2021-06-23 PROCEDURE — 999N001017 HC STATISTIC GLUCOSE BY METER IP

## 2021-06-23 PROCEDURE — 87086 URINE CULTURE/COLONY COUNT: CPT | Performed by: STUDENT IN AN ORGANIZED HEALTH CARE EDUCATION/TRAINING PROGRAM

## 2021-06-23 PROCEDURE — 250N000011 HC RX IP 250 OP 636: Performed by: PSYCHIATRY & NEUROLOGY

## 2021-06-23 PROCEDURE — 97530 THERAPEUTIC ACTIVITIES: CPT | Mod: GO

## 2021-06-23 PROCEDURE — G1004 CDSM NDSC: HCPCS | Mod: GC | Performed by: RADIOLOGY

## 2021-06-23 PROCEDURE — 97530 THERAPEUTIC ACTIVITIES: CPT | Mod: GP

## 2021-06-23 PROCEDURE — 92526 ORAL FUNCTION THERAPY: CPT | Mod: GN

## 2021-06-23 PROCEDURE — 250N000011 HC RX IP 250 OP 636: Performed by: STUDENT IN AN ORGANIZED HEALTH CARE EDUCATION/TRAINING PROGRAM

## 2021-06-23 PROCEDURE — 80048 BASIC METABOLIC PNL TOTAL CA: CPT | Performed by: STUDENT IN AN ORGANIZED HEALTH CARE EDUCATION/TRAINING PROGRAM

## 2021-06-23 RX ORDER — POTASSIUM CHLORIDE 7.45 MG/ML
10 INJECTION INTRAVENOUS
Status: COMPLETED | OUTPATIENT
Start: 2021-06-23 | End: 2021-06-23

## 2021-06-23 RX ADMIN — LEVETIRACETAM 500 MG: 500 TABLET, FILM COATED ORAL at 20:30

## 2021-06-23 RX ADMIN — ATORVASTATIN CALCIUM 40 MG: 40 TABLET, FILM COATED ORAL at 08:10

## 2021-06-23 RX ADMIN — Medication 25 MCG: at 08:10

## 2021-06-23 RX ADMIN — CARVEDILOL 3.12 MG: 3.12 TABLET, FILM COATED ORAL at 18:31

## 2021-06-23 RX ADMIN — INSULIN GLARGINE 26 UNITS: 100 INJECTION, SOLUTION SUBCUTANEOUS at 22:06

## 2021-06-23 RX ADMIN — LORATADINE 10 MG: 10 TABLET ORAL at 08:10

## 2021-06-23 RX ADMIN — LEVETIRACETAM 500 MG: 500 TABLET, FILM COATED ORAL at 08:10

## 2021-06-23 RX ADMIN — CARVEDILOL 3.12 MG: 3.12 TABLET, FILM COATED ORAL at 08:09

## 2021-06-23 RX ADMIN — LABETALOL HYDROCHLORIDE 10 MG: 5 INJECTION, SOLUTION INTRAVENOUS at 16:17

## 2021-06-23 RX ADMIN — PREGABALIN 100 MG: 100 CAPSULE ORAL at 20:30

## 2021-06-23 RX ADMIN — POTASSIUM CHLORIDE 10 MEQ: 7.46 INJECTION, SOLUTION INTRAVENOUS at 15:32

## 2021-06-23 RX ADMIN — POTASSIUM CHLORIDE 10 MEQ: 7.46 INJECTION, SOLUTION INTRAVENOUS at 14:29

## 2021-06-23 RX ADMIN — LEVOTHYROXINE SODIUM 88 MCG: 0.09 TABLET ORAL at 08:10

## 2021-06-23 RX ADMIN — CYANOCOBALAMIN TAB 1000 MCG 1000 MCG: 1000 TAB at 08:05

## 2021-06-23 RX ADMIN — DOCUSATE SODIUM 50 MG AND SENNOSIDES 8.6 MG 2 TABLET: 8.6; 5 TABLET, FILM COATED ORAL at 08:08

## 2021-06-23 RX ADMIN — NIFEDIPINE 60 MG: 60 TABLET, EXTENDED RELEASE ORAL at 08:05

## 2021-06-23 RX ADMIN — DULOXETINE HYDROCHLORIDE 20 MG: 20 CAPSULE, DELAYED RELEASE ORAL at 08:10

## 2021-06-23 RX ADMIN — PREGABALIN 100 MG: 100 CAPSULE ORAL at 08:12

## 2021-06-23 ASSESSMENT — ACTIVITIES OF DAILY LIVING (ADL)
ADLS_ACUITY_SCORE: 26
ADLS_ACUITY_SCORE: 25
ADLS_ACUITY_SCORE: 25
ADLS_ACUITY_SCORE: 26
ADLS_ACUITY_SCORE: 28
ADLS_ACUITY_SCORE: 27

## 2021-06-23 NOTE — PROGRESS NOTES
[facility]    Stroke Progress Note    Interval Events  Isabell was admitted on 6/21 for right sided weakness, right facial droop and slurred speech. CT at 16:30 showed Left sided basal ganglia intracranial hemorrhage, repeat CT at 22:30 showed a stable lesion, follow up imaging on 6/22 and 6/23 also show no changes. Isabell was unable to void last night and was straight cathed for 600ml. Today she is alert and interactive with staff.      Impression  Isabell Matthews is a 60-year-old woman with a PMH of insulin dependent diabetes mellitus, HTN, HLD, CAD (s/p PCI in 2004), CKD III, hypothyroidism, vascular dimentia with previous ischemic strokes associated with seizures. She presented with right sided facial droop, right sided weaknss and slurred speech. She was found to have an intracranial hemorrhage in the left basal ganglia. 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 Repeat CT and MRI show stable bleed. Given the MRI findings with several microbleeds found in the chuck and thalamus, aspirin should be held for a minimum of 4 weeks. From a neuro perspective she is stable and can transfer to the floor for continued cares.     Plan  Neuro  # Left Basal ganglia hemorraghic stroke, ICH score 1 on admission; etiology likely hypertensiv  - Repeat CT stable on 6/21, 6/22 and 6/23   - MRI shows stable intraparenchymal hemorrhage (6/22)  - Neuro checks q2  - SBP< 140  - PT/OT  - Hold Aspirin 4 weeks    -Repeat MRI to access stability of bleed before restarting  - F/u in neuro stroke clinic    #Hx of Seizures  - Continue PTA Keppra 500mg BID    #Peripheral neuropathy  - Continue PTA Duloxetine 20mg daily  - Continue PTA Pregabalin 100mg BID    CV  #HTN  #HLD  #CAD s/p PCI (2004)  - Continue PTA atorvastatin 40 mg daily  - Continue PTA hydrochlorothiazide 25mg daily  - Continue PTA carvediol 3.125mg BID  - Continue PTA nifedipine 60mg  daily   - Hold PTA aspirin in the setting of acute ICH    Pulm  DAMIAN  -Continuous pulse ox  -O2 goal >92%  -Supplemental O2 to maintain goals    GI  Last BM: last bowel movement  -Bowel regimen: polyethylene glycol and senna-doc  -Diet: cleared for liquid diet, nectar thick     Renal  #Hypokalemia (replaced 6/22, K: 3.2 on 6/23)  - Daily BMP  - RN managed electrolyte repletion protocol     #CKD III (Cr 1.49)  - trend Cr (stable between 1.49 - 1.7 which is baseline)  - trend GFR (stable between  30 - 37 which is baseline)  - monitor I/Os, UOP stable at around 1ml/kg/hr    Endo  #Insulin dependent diabetes mellitus  - A1C: 7.8  - Monitor blood sugars (have been between 150 and 380)  - Continue Medium insulin resistance dosing sliding scale insulin (Aspart)  - continue PTA glargine 26 units at bedtime    #Hypothyroidism  -Continue PTA levothyroxine 88mcg daily     ID  Afebrile, no leukocytosis, no indication for abx.  - Daily CBC - stable   - Trend temperature curve.      Heme  Hgb 12.0, plt 250  - Hgb goal >7, plt goal >100k  - Daily CBC    FEN: Full liquid diet, encourage PO intake, monitor lytes and replace as needed.  Ppx: DVT - pneumatic compression devices, hold chemoppx in the setting of acute ICH; GI - not indicated.  Code: Full.  Dispo: Acute rehab or home based PT     Key Turcios (MS4)    Resident/Fellow Attestation   I, BRITTNEY MARROQUIN, was present with the medical/BULMARO student who participated in the service and in the documentation of the note.  I have verified the history and personally performed the physical exam and medical decision making.  I agree with the assessment and plan of care as documented in the note.        BRITTNEY MARROQUIN MD  PGY5  Date of Service (when I saw the patient): 06/23/21    ______________________________________________________    Medications   Home Meds  Prior to Admission medications    Medication Sig Start Date End Date Taking? Authorizing Provider   aspirin (ASA) 81 MG EC  tablet Take 1 tablet (81 mg) by mouth daily 12/13/20   Bony Castaneda MD   atorvastatin (LIPITOR) 40 MG tablet Take 1 tablet (40 mg) by mouth daily 9/2/20   Bony Castaneda MD   blood glucose (NO BRAND SPECIFIED) test strip Use to test blood sugar 4-5 times daily or as directed. 11/2/20   Bony Castaneda MD   carvedilol (COREG) 3.125 MG tablet Take 1 tablet (3.125 mg) by mouth 2 times daily (with meals) 9/2/20   Bony Castaneda MD   clotrimazole (LOTRIMIN) 1 % external cream Apply topically 2 times daily Until rash resolved 11/2/20   Bony Castaneda MD   cyanocobalamin (VITAMIN B-12) 1000 MCG tablet Take 1 tablet (1,000 mcg) by mouth daily 9/2/20   Bony Castaneda MD   diclofenac (VOLTAREN) 1 % topical gel Apply 2 g topically 4 times daily as needed for moderate pain 2/25/21   Bony Castaneda MD   DULoxetine (CYMBALTA) 20 MG capsule Take 1 capsule (20 mg) by mouth daily 11/2/20   Bony Castaneda MD   hydrochlorothiazide (HYDRODIURIL) 25 MG tablet Taper to one half for one week then off if BP less than 140/90 12/16/20   Bony Castaneda MD   insulin aspart (NOVOLOG PEN) 100 UNIT/ML pen Use with meals and at bedtime per sliding scale and carb correction 1 unit for 10 g carbs,  Route: Inject 0-10 Units Subcutaneous 4 times daily (with meals and nightly) Use with meals and at bedtime per sliding scale and carb correction 1 unit for 10 g carbs.  Use 24-48 units per day. 12/7/20   Bony Castaneda MD   insulin glargine (LANTUS PEN) 100 UNIT/ML pen Inject 26 Units Subcutaneous At Bedtime 10/12/20   Shira Luna PA-C   insulin pen needle (31G X 8 MM) 31G X 8 MM miscellaneous Use 4 pen needles daily or as directed. 9/2/20   Bony Castaneda MD   levETIRAcetam (KEPPRA) 500 MG tablet Take 1 tablet (500 mg) by mouth 2 times daily 9/2/20   Bony Castaneda, MD   loratadine (CLEAR-ATADINE) 10 MG tablet Take 10 mg by mouth daily    Reported, Patient   Melatonin 10  MG TABS tablet Take 10 mg by mouth At Bedtime    Unknown, Entered By History   NIFEdipine ER (ADALAT CC) 60 MG 24 hr tablet Take 1 tablet (60 mg) by mouth daily 9/2/20   Bony Castaneda MD   pregabalin (LYRICA) 100 MG capsule Take 1 capsule (100 mg) by mouth 2 times daily For additional refills, please schedule a follow-up appointment at [phone] 5/25/21   Bony Castaneda MD   SYNTHROID 88 MCG tablet Take 1 tablet (88 mcg) by mouth daily 9/2/20   Bony Castaneda MD   terbinafine (LAMISIL) 1 % external cream Apply topically to gume of rash twice daily if needed 2/25/21   Bony Castaneda MD   Vitamin D3 (CHOLECALCIFEROL) 25 mcg (1000 units) tablet Take 1 tablet (25 mcg) by mouth daily 9/2/20   Bony Castaneda MD       Scheduled Meds    atorvastatin  40 mg Oral or Feeding Tube Daily     carvedilol  3.125 mg Oral or Feeding Tube BID w/meals     cyanocobalamin  1,000 mcg Oral or Feeding Tube Daily     DULoxetine  20 mg Oral or Feeding Tube Daily     insulin aspart  1-10 Units Subcutaneous TID AC     insulin aspart  1-7 Units Subcutaneous At Bedtime     insulin glargine  26 Units Subcutaneous At Bedtime     levETIRAcetam  500 mg Oral or Feeding Tube BID     levothyroxine  88 mcg Oral or Feeding Tube Daily     loratadine  10 mg Oral or Feeding Tube Daily     NIFEdipine ER  60 mg Oral Daily     polyethylene glycol  17 g Oral or Feeding Tube Daily     pregabalin  100 mg Oral or Feeding Tube BID     senna-docusate  1-2 tablet Oral or Feeding Tube BID     Vitamin D3  25 mcg Oral or Feeding Tube Daily       Infusion Meds      PRN Meds  acetaminophen **OR** acetaminophen **OR** acetaminophen, glucose **OR** dextrose **OR** glucagon, fentaNYL, labetalol, magnesium citrate, naloxone **OR** naloxone **OR** naloxone **OR** naloxone, ondansetron **OR** ondansetron       PHYSICAL EXAMINATION  Temp:  [97.5  F (36.4  C)-98.2  F (36.8  C)] 98.2  F (36.8  C)  Pulse:  [] 83  Resp:  [7-18] 16  BP:  ()/(58-79) 136/74  SpO2:  [94 %-100 %] 100 %     General:  patient lying in bed without any acute distress    HEENT:  normocephalic/atraumatic  Cardio:  RRR  Pulmonary:  no respiratory distress  Abdomen:  soft, non-tender  Extremities:  no edema  Skin:  warm/dry      Neuro:  MS: alert and oriented to person, place and circumstance. Aware of year intermittently (but this is not new). She follows simple and multi step commands and can repeat phrases. Naming is intact.  CN: Blinks bilaterally to threat, PEERL, EOMI, right lower facial droop improving. Slight asymmetry with smile on right. Palate elevates symmetrically, uvula midline, can close both eyes and puff out cheeks. Hearing is intact to conversation. Facial sensation intact.  Motor: 5/5 strength in LUE, LLE, and RLE. There is mild drift in the RUE that is gradually improving, strength is 5-/5.  Sensory: intact to light touch in 4/4 extremities, no extinction.  Reflexes: 2+ and symmetric, no castillo, toes down going.  Gait: deferred     Stroke Scales  ICH score 1 on admit (for GCS 5-12), NIHSS score of 15. Scores today are 0 and   National Institutes of Health Stroke Scale   Score    Level of consciousness: (1)   Not alert; arousable w/ minor stim to obey/answer/respond    LOC questions: (0)   Answers both questions correctly    LOC commands: (0)   Performs both tasks correctly    Best gaze: (0)   Normal    Visual: (0)   No visual loss    Facial palsy: (1)   Minor paralysis (flat nasolabial fold, smile asymmetry)    Motor arm (left): (0)   No Drift    Motor arm (right): (1)   Drift    Motor leg (left): (0)   No drift    Motor leg (right): (0)   No drift    Limb ataxia: (0)   Absent    Sensory: (0)   Normal- no sensory loss    Best language: (0)   Normal- no aphasia    Dysarthria: (1)   Mild to moderate dysarthria    Extinction and inattention: (0)   No abnormality        Total Score:  4       On admission: 15       ICH Score Tool Patients Score   Age ? 80  years 1 point    GCS score  3-4  5-12   13-15    2 point  1 point  0 point 0   Hematoma volume, cm3 ? 30 1 point 0   Intraventricular extension 1 point 0   Infratentorial location 1 point 0   Patient s ICH Score (0-6) = 0           On admission: 1      Imaging  I personally reviewed all imaging; relevant findings per HPI.     MRI on 6/21    Lab Results Data   CBC  Recent Labs   Lab 06/23/21  0654 06/22/21  0426 06/21/21  1618   WBC 5.3 5.6 7.0   RBC 4.91 4.43 4.77   HGB 13.4 12.0 13.2   HCT 42.5 38.0 41.3    250 260     Basic Metabolic Panel    Recent Labs   Lab 06/23/21  0654 06/22/21  0426 06/21/21  1618    140 134   POTASSIUM 3.2* 3.0* 3.7   CHLORIDE 107 105 101   CO2 27 28 25   BUN 25 24 29   CR 1.49* 1.56* 1.56*   * 171* 250*   VERONICA 9.3 8.8 9.0     Liver Panel  No results for input(s): PROTTOTAL, ALBUMIN, BILITOTAL, ALKPHOS, AST, ALT, BILIDIRECT in the last 168 hours.  INR    Recent Labs   Lab Test 06/21/21  1618 09/27/20  1601   INR 0.91 0.99      Lipid Profile  No lab results found.  A1C    Recent Labs   Lab Test 06/21/21 2011 09/27/20  1601   A1C 7.8* 11.7*     Troponin I    Recent Labs   Lab 06/21/21  1618   TROPI <0.015

## 2021-06-23 NOTE — PROGRESS NOTES
SW received MD consult order for stroke.   Pt's orientation waxes and wanes, pt has slurred speech and word finding difficulty.  SW spoke with OT who states that information provided by pt may/may not be accurate.  SW introduced self to pt. With pt's verbal permission, SW phoned pt's daughter (Vishal) and left a message for Vishal to call in regards to completion of initial assessment.    MONICA Lino  Social Work, 6A  Phone:  [phone]  Pager:  [phone]  6/23/2021

## 2021-06-23 NOTE — PROGRESS NOTES
Arrived from:  4a 2110  Belongings/meds:  With patient   2 RN Skin Assessment Completed by:  Geronimo/Rosalie  Non-intact findings documented (yes/no/NA): Yes

## 2021-06-23 NOTE — PLAN OF CARE
Admitted for left basel ganglia ICH. Hx DM II, HTN, HLD, CAD, CKD III, vascular dementia, seizures, previous CVA, MI. VSS on RA, systolic goal <140. Neuros: d/o time. Orientation waxes and wanes, intermittent lethargy. 4/5 throughout. Numbness to BLE baseline, decreased sensation BUE. Slow, slurred speech with mild aphasia and word finding difficulty. Slight right drift and right facial droop. PIV SL. CCM NSR. Clear liquid diet with nectar thick liquids. Incontinent of bowel and bladder, purewick in use. Has not been OOB, up 2/gb/pivot BSC. Plan discharge TCU vs home. Continue to monitor.

## 2021-06-23 NOTE — PLAN OF CARE
Status: Admitted for left basel ganglia ICH. Hx DM II, HTN, HLD, CAD, CKD III, vascular dementia, seizures, previous CVA.  Vitals: VSS. BP was at 93/76 @ 0745 but is now WNL.  Neuros: Alert orientation waxes and wanes. Orientated to person and situation this AM. This afternoon she was A&Ox4. Slowed and slurred speech, difficulty in finding words. Was lethargic this AM, alert this afternoon. No R drift or R droop noted.   IV: PIV. Potassium infusing.   Labs/Electrolytes: Pt's BS was 379 @ 1130. Administered insulin to correct it.  Resp/trach: WNL  Diet: NPO. Pt struggled with swallowing pills this AM. Cleared throat multiple times, coughed, and has difficulty initiating swallowing.  Bowel status: BMx2  : Pt voided and unmeasurable amount this AM. Could not void this afternoon. Straight cathed patient at 1345 and got 500mL out.   Skin: WDL  Activity: A1&GB. Ambulated to bathroom.   Social: No one visited today.   Plan: Continue monitor and follow plan of care. Stroke PLC scheduled for tomorrow at 1200.  Updates this shift: Pt had CT scan today due to her being lethargic and having difficulty swallowing. Scan came back normal.

## 2021-06-23 NOTE — PLAN OF CARE
Status: Admitted for left basel ganglia ICH. Hx DM II, HTN, HLD, CAD, CKD III, vascular dementia, seizures, previous CVA  Vitals: VSS on RA HTN Systolic goal of less than 140 on Cardiac monitor  Neuros: Alert orientation waxes and wanes. Orientated to self. Slow slurred speech with word finding. Slight R droop.R  arm drift.   IV: 2 PIV SL  Resp/trach: WDL  Diet: Full liquids nectar thick  Bowel status: LBM 6/22  : Pt unable to void. Bladder scanned and straight cath for 600mL  Skin: WDL  Pain: Denies  Activity: A1 GB  Plan: Monitor POC

## 2021-06-24 ENCOUNTER — APPOINTMENT (OUTPATIENT)
Dept: OCCUPATIONAL THERAPY | Facility: CLINIC | Age: 61
DRG: 064 | End: 2021-06-24
Payer: MEDICARE

## 2021-06-24 ENCOUNTER — APPOINTMENT (OUTPATIENT)
Dept: SPEECH THERAPY | Facility: CLINIC | Age: 61
DRG: 064 | End: 2021-06-24
Payer: MEDICARE

## 2021-06-24 LAB
ALBUMIN UR-MCNC: 10 MG/DL
AMORPH CRY #/AREA URNS HPF: ABNORMAL /HPF
ANION GAP SERPL CALCULATED.3IONS-SCNC: 5 MMOL/L (ref 3–14)
APPEARANCE UR: CLEAR
BACTERIA #/AREA URNS HPF: ABNORMAL /HPF
BACTERIA SPEC CULT: NORMAL
BACTERIA SPEC CULT: NORMAL
BILIRUB UR QL STRIP: NEGATIVE
BUN SERPL-MCNC: 22 MG/DL (ref 7–30)
CALCIUM SERPL-MCNC: 9.1 MG/DL (ref 8.5–10.1)
CHLORIDE SERPL-SCNC: 109 MMOL/L (ref 94–109)
CO2 SERPL-SCNC: 27 MMOL/L (ref 20–32)
COLOR UR AUTO: ABNORMAL
CREAT SERPL-MCNC: 1.62 MG/DL (ref 0.52–1.04)
ERYTHROCYTE [DISTWIDTH] IN BLOOD BY AUTOMATED COUNT: 15.7 % (ref 10–15)
GFR SERPL CREATININE-BSD FRML MDRD: 33 ML/MIN/{1.73_M2}
GLUCOSE BLDC GLUCOMTR-MCNC: 108 MG/DL (ref 70–99)
GLUCOSE BLDC GLUCOMTR-MCNC: 122 MG/DL (ref 70–99)
GLUCOSE BLDC GLUCOMTR-MCNC: 144 MG/DL (ref 70–99)
GLUCOSE BLDC GLUCOMTR-MCNC: 177 MG/DL (ref 70–99)
GLUCOSE BLDC GLUCOMTR-MCNC: 424 MG/DL (ref 70–99)
GLUCOSE BLDC GLUCOMTR-MCNC: 49 MG/DL (ref 70–99)
GLUCOSE BLDC GLUCOMTR-MCNC: 61 MG/DL (ref 70–99)
GLUCOSE BLDC GLUCOMTR-MCNC: 65 MG/DL (ref 70–99)
GLUCOSE SERPL-MCNC: 120 MG/DL (ref 70–99)
GLUCOSE UR STRIP-MCNC: NEGATIVE MG/DL
HCT VFR BLD AUTO: 39.6 % (ref 35–47)
HGB BLD-MCNC: 12.4 G/DL (ref 11.7–15.7)
HGB UR QL STRIP: NEGATIVE
KETONES UR STRIP-MCNC: NEGATIVE MG/DL
LEUKOCYTE ESTERASE UR QL STRIP: NEGATIVE
Lab: NORMAL
MAGNESIUM SERPL-MCNC: 2.1 MG/DL (ref 1.6–2.3)
MCH RBC QN AUTO: 27.3 PG (ref 26.5–33)
MCHC RBC AUTO-ENTMCNC: 31.3 G/DL (ref 31.5–36.5)
MCV RBC AUTO: 87 FL (ref 78–100)
NITRATE UR QL: NEGATIVE
PH UR STRIP: 6 PH (ref 5–7)
PHOSPHATE SERPL-MCNC: 3.7 MG/DL (ref 2.5–4.5)
PLATELET # BLD AUTO: 246 10E9/L (ref 150–450)
POTASSIUM SERPL-SCNC: 3 MMOL/L (ref 3.4–5.3)
POTASSIUM SERPL-SCNC: 4.8 MMOL/L (ref 3.4–5.3)
RBC # BLD AUTO: 4.54 10E12/L (ref 3.8–5.2)
RBC #/AREA URNS AUTO: <1 /HPF (ref 0–2)
SODIUM SERPL-SCNC: 141 MMOL/L (ref 133–144)
SOURCE: ABNORMAL
SP GR UR STRIP: 1.01 (ref 1–1.03)
SPECIMEN SOURCE: NORMAL
SQUAMOUS #/AREA URNS AUTO: <1 /HPF (ref 0–1)
UROBILINOGEN UR STRIP-MCNC: NORMAL MG/DL (ref 0–2)
WBC # BLD AUTO: 6.3 10E9/L (ref 4–11)
WBC #/AREA URNS AUTO: <1 /HPF (ref 0–5)

## 2021-06-24 PROCEDURE — 36415 COLL VENOUS BLD VENIPUNCTURE: CPT | Performed by: PSYCHIATRY & NEUROLOGY

## 2021-06-24 PROCEDURE — 120N000002 HC R&B MED SURG/OB UMMC

## 2021-06-24 PROCEDURE — 84100 ASSAY OF PHOSPHORUS: CPT | Performed by: PSYCHIATRY & NEUROLOGY

## 2021-06-24 PROCEDURE — 250N000011 HC RX IP 250 OP 636: Performed by: PSYCHIATRY & NEUROLOGY

## 2021-06-24 PROCEDURE — 81001 URINALYSIS AUTO W/SCOPE: CPT | Performed by: STUDENT IN AN ORGANIZED HEALTH CARE EDUCATION/TRAINING PROGRAM

## 2021-06-24 PROCEDURE — 999N000128 HC STATISTIC PERIPHERAL IV START W/O US GUIDANCE

## 2021-06-24 PROCEDURE — 250N000013 HC RX MED GY IP 250 OP 250 PS 637: Performed by: PSYCHIATRY & NEUROLOGY

## 2021-06-24 PROCEDURE — 250N000011 HC RX IP 250 OP 636: Performed by: STUDENT IN AN ORGANIZED HEALTH CARE EDUCATION/TRAINING PROGRAM

## 2021-06-24 PROCEDURE — 97535 SELF CARE MNGMENT TRAINING: CPT | Mod: GO

## 2021-06-24 PROCEDURE — 80048 BASIC METABOLIC PNL TOTAL CA: CPT | Performed by: PSYCHIATRY & NEUROLOGY

## 2021-06-24 PROCEDURE — 92526 ORAL FUNCTION THERAPY: CPT | Mod: GN | Performed by: SPEECH-LANGUAGE PATHOLOGIST

## 2021-06-24 PROCEDURE — 258N000001 HC RX 258: Performed by: STUDENT IN AN ORGANIZED HEALTH CARE EDUCATION/TRAINING PROGRAM

## 2021-06-24 PROCEDURE — 250N000013 HC RX MED GY IP 250 OP 250 PS 637: Performed by: STUDENT IN AN ORGANIZED HEALTH CARE EDUCATION/TRAINING PROGRAM

## 2021-06-24 PROCEDURE — 258N000003 HC RX IP 258 OP 636: Performed by: STUDENT IN AN ORGANIZED HEALTH CARE EDUCATION/TRAINING PROGRAM

## 2021-06-24 PROCEDURE — 85027 COMPLETE CBC AUTOMATED: CPT | Performed by: PSYCHIATRY & NEUROLOGY

## 2021-06-24 PROCEDURE — 83735 ASSAY OF MAGNESIUM: CPT | Performed by: PSYCHIATRY & NEUROLOGY

## 2021-06-24 PROCEDURE — 84132 ASSAY OF SERUM POTASSIUM: CPT | Performed by: PSYCHIATRY & NEUROLOGY

## 2021-06-24 PROCEDURE — 999N001017 HC STATISTIC GLUCOSE BY METER IP

## 2021-06-24 PROCEDURE — 99233 SBSQ HOSP IP/OBS HIGH 50: CPT | Mod: GC | Performed by: PSYCHIATRY & NEUROLOGY

## 2021-06-24 RX ORDER — POTASSIUM CHLORIDE 7.45 MG/ML
10 INJECTION INTRAVENOUS
Status: COMPLETED | OUTPATIENT
Start: 2021-06-24 | End: 2021-06-24

## 2021-06-24 RX ORDER — SODIUM CHLORIDE 9 MG/ML
INJECTION, SOLUTION INTRAVENOUS CONTINUOUS
Status: DISCONTINUED | OUTPATIENT
Start: 2021-06-24 | End: 2021-06-25

## 2021-06-24 RX ADMIN — SODIUM CHLORIDE: 9 INJECTION, SOLUTION INTRAVENOUS at 04:22

## 2021-06-24 RX ADMIN — LEVETIRACETAM 500 MG: 500 TABLET, FILM COATED ORAL at 20:08

## 2021-06-24 RX ADMIN — LABETALOL HYDROCHLORIDE 10 MG: 5 INJECTION, SOLUTION INTRAVENOUS at 05:14

## 2021-06-24 RX ADMIN — POTASSIUM CHLORIDE 10 MEQ: 7.46 INJECTION, SOLUTION INTRAVENOUS at 18:43

## 2021-06-24 RX ADMIN — PREGABALIN 100 MG: 100 CAPSULE ORAL at 20:08

## 2021-06-24 RX ADMIN — LABETALOL HYDROCHLORIDE 10 MG: 5 INJECTION, SOLUTION INTRAVENOUS at 04:58

## 2021-06-24 RX ADMIN — POTASSIUM CHLORIDE 10 MEQ: 7.46 INJECTION, SOLUTION INTRAVENOUS at 12:06

## 2021-06-24 RX ADMIN — DEXTROSE MONOHYDRATE 25 ML: 500 INJECTION PARENTERAL at 04:11

## 2021-06-24 RX ADMIN — LABETALOL HYDROCHLORIDE 10 MG: 5 INJECTION, SOLUTION INTRAVENOUS at 05:33

## 2021-06-24 RX ADMIN — CARVEDILOL 3.12 MG: 3.12 TABLET, FILM COATED ORAL at 17:42

## 2021-06-24 RX ADMIN — POTASSIUM CHLORIDE 10 MEQ: 7.46 INJECTION, SOLUTION INTRAVENOUS at 17:39

## 2021-06-24 RX ADMIN — POTASSIUM CHLORIDE 10 MEQ: 7.46 INJECTION, SOLUTION INTRAVENOUS at 13:40

## 2021-06-24 RX ADMIN — DEXTROSE MONOHYDRATE 25 ML: 500 INJECTION PARENTERAL at 04:33

## 2021-06-24 RX ADMIN — DEXTROSE MONOHYDRATE 25 ML: 500 INJECTION PARENTERAL at 12:18

## 2021-06-24 RX ADMIN — NIFEDIPINE 60 MG: 60 TABLET, EXTENDED RELEASE ORAL at 08:40

## 2021-06-24 ASSESSMENT — ACTIVITIES OF DAILY LIVING (ADL)
ADLS_ACUITY_SCORE: 26
ADLS_ACUITY_SCORE: 25
ADLS_ACUITY_SCORE: 27

## 2021-06-24 NOTE — PROGRESS NOTES
"Care Management Initial Consult    General Information  Assessment completed with: Children, (Daughter \"Maria Guadalupe\")  Type of CM/SW Visit: Initial Assessment    Primary Care Provider verified and updated as needed: Yes   Readmission within the last 30 days: no previous admission in last 30 days      Reason for Consult: (CVA assess/Disposition planning)  Advance Care Planning: Advance Care Planning Reviewed: (Patient does not have a health care directive)          Communication Assessment  Patient's communication style: spoken language (English or Bilingual)    Hearing Difficulty or Deaf: other (see comments)(usually goes to daughter on errand running. )   Wear Glasses or Blind: no    Cognitive  Cognitive/Neuro/Behavioral: .WDL except, level of consciousness, arousability, speech, orientation  Level of Consciousness: lethargic  Arousal Level: arouses to voice, arouses to touch/gentle shaking  Orientation: disoriented to, time  Mood/Behavior: flat affect  Best Language: 0 - No aphasia  Speech: slow, whispers    Living Environment:   People in home:       Current living Arrangements: apartment      Able to return to prior arrangements: yes       Family/Social Support:  Care provided by: child(tiffany)  Provides care for: no one  Marital Status:   Children          Description of Support System: Involved    Support Assessment: Adequate family and caregiver support    Current Resources:   Patient receiving home care services: No     Community Resources: None  Equipment currently used at home: (built in shower chair, w/c, hh shower nozzle, higher toilet)  Supplies currently used at home: (Incontinence briefs/pads)    Employment/Financial:  Employment Status: disabled     Employment/ Comments: (Patient did not serve in the )  Financial Concerns: No concerns identified   Referral to Financial Counselor: No       Lifestyle & Psychosocial Needs:        Socioeconomic History     Marital status:      " "Spouse name: Not on file     Number of children: Not on file     Years of education: Not on file     Highest education level: Not on file     Tobacco Use     Smoking status: Former Smoker     Packs/day: 0.50     Years: 35.00     Pack years: 17.50     Types: Cigarettes     Quit date: 2018     Years since quittin.9     Smokeless tobacco: Never Used   Substance and Sexual Activity     Alcohol use: Not Currently     Drug use: Not Currently     Sexual activity: Not Currently       Functional Status:  Prior to admission patient needed assistance:   Dependent ADLs:: (daughter provides supervision and assist as needed)  Dependent IADLs:: (Daughter completes IADL's)       Mental Health Status:  Mental Health Status: (History of depression treated with medication therapy)       Chemical Dependency Status:  Chemical Dependency Status: (Not applicable)             Values/Beliefs:  Spiritual, Cultural Beliefs, Presybeterian Practices, Values that affect care: yes       Cultural/Presybeterian Practices Patient Routinely Participates In: (Anabaptism)       Additional Information:  KINZA met with pt and daughter (Vishal) who goes by \"Maria Guadalupe\" to complete initial assessment and to discuss discharge planning.  Pt and Maria Guadalupe live together in a senior subsidized apt building.  There are no steps to enter the building.  Once inside, the building is accessible by elevator.  Pt has a washer and dryer in her apt.  The master bedroom bath has a walk in shower.  Prior to hospitalization, pt was indep with all mobility.  Pt used a w/c for long distances.  Pt has had 2 reported falls in the past year (without fracture).  Pt requires prompting for completion of all tasks (ADL's and mobility) as well as supervision at all times.  Maria Guadalupe provides this level of assistance.  Maria Guadalupe completes the housekeeping, laundry, shopping and cooking tasks.  Maria Guadalupe provides pt with transportation, administers pt's meds and assists pt with financial mgnt.  Pt has a built " in shower, a w/c, heightened toilet and a hh shower nozzle.  Pt has a [place] Handicapped Parking Certificate.  Pt is on Medical Assistance number [id number].  Maria Guadalupe stated that she started the process to get pt on the CADI waiver program in Dec of 2020, had a [place] Cho[employer] assessment, filled out multiple forms but the case ended up being closed as Maria Guadalupe states that she reportedly did not fill out a form that she didn't know she was suppose to.  Pt does not have a health care directive.   Pt did not serve in the .  Pt's lloyd is reportedly Anabaptist.  Pt's income consist of Social Security.  Pt's PCP is Dr. Castaneda.  Pt has not had add'l hospitalizations in the past 30 days.  Pt has a history of depression that is treated with medication therapy.  Pt does not have a CD history.  Pt's support system includes daughters Maria Guadalupe (lives with pt and is pt's full time caregiver) and Felton ([place], [place]) and son (Eris, [place], reportedly is paralyzed).      Disposition planning was discussed OT and Phy Therapy are recommending a short term rehab stay.   Maria Guadalupe supports this plan if pt is on a rehab unit.   Maria Guadalupe does not want pt placed on a long term care unit.  Maria Guadalupe is motivated to care for pt at home when pt completes her rehab stay.  Per Neuro Stroke, pt may have a NG placed and if so, this will limit facility choices as not all SNF's accept NG's.  Will wait to discuss facility preferences until decision is made regarding NG.      SW will follow for discharge planning.    MONICA Lino  Social Work, 6A  Phone:  [phone]  Pager:  [phone]  6/25/2021        CAMILO James

## 2021-06-24 NOTE — PROGRESS NOTES
Straight Catheterization    Request for straight catheterizing patient received from charge RN. Patient record/order reviewed and observed that patient's been requiring straight catheterization for greater than 24 hours, and had the procedure done three times in the past 27 hours; order to notify MD regarding this made aware to both charge RN and bedside RN. Straight catheterization performed per policy and under the direct observation of Shanthi Dee RN. Acquired clear yellow urine without sediment or foul odor and patient tolerated the procedure well. Denied having any pain/discomfort.

## 2021-06-24 NOTE — PLAN OF CARE
"Status: Admitted for left basel ganglia ICH. Hx DM II, HTN, HLD, CAD, CKD III, vascular dementia, seizures, previous CVA.  Vitals: VSS. BP elevated and required Labetalol x1 with decrease in BP- see flowsheet.  Neuros: Alert orientation waxes and wanes. Orientated to person and \"hospital\" this zan. Stated \"21\" for the date and did not know day of the week or date. Slowed and slurred speech, difficulty in finding words. Lethargic at times , but arouses easily. No R drift or R droop noted as had been documented last zan.  IV: PIV. Potassium infusing.   Labs/Electrolytes: K+ replaced and will re-check in am  Resp/trach: WNL  Diet: NPO x meds. On MD list for possible IVF. Was able to swallow 1 small pill with a sip of water this zan. She was noted to hold the pill and water in her mouth until prompted to swallow it. She then did swallow it without difficulty.  Bowel status: large BM x1 on BSC this ave.   : Pt voided and unmeasurable amount this AM. Could not void this afternoon. Straight cathed patient at 1345 and got 500mL out. Per notes, urine was malodorous.   Scanned at 1900 for 203 and 263cc. Order is to cath if over 300cc. Please obtain UA when able as per order 6/21.   Skin: WDL  Activity: A1&GB and pivoted to BSC  Social: No one visited today.   Plan: Continue monitor and follow plan of care. Stroke PLC scheduled for tomorrow at 1200.  Updates this shift: Pt had CT scan today due to her being lethargic and having difficulty swallowing.   "

## 2021-06-24 NOTE — PLAN OF CARE
"Status: Admitted for L basel ganglia ICH. Hx DM II, HTN, HLD, CAD, CKD III, vascular dementia, seizures, previous CVA.  Vitals: HTN. Labetolol x3 to reach goal SBP<140  Neuros: A&O to self. Intermittently oriented to \"hospital\" and the year. Flat affect. Slow whispered speech. BUE 5/5, BLE 4/5.   IV: PIV infusing NS @75  Labs/Electrolytes: 50ml dextrose for BG<70  Resp/trach: LS clear.   Diet: NPO ex meds. Pt has hesitation to swallow and at times needing prompts. Pt able to swallow all meds this evening.  Bowel status: continent/incontinent of two BMs this shift. Multiple BMs throughout 6/23. BM meds held  : Unable to void. Pt straight cathed at 2130 for 350cc, and at 0530 for another 350cc- on charge list for [facility] order.   Skin: WNL  Pain: Denies  Activity: A1+ GB  Plan: stroke PLC tomorrow at 1200pm.      "

## 2021-06-24 NOTE — PLAN OF CARE
[id number]  Patient admitted on 6/21 with left intracerebral hemorrhagic stroke, VSS, Neuros: disoriented to time, slow speech/whispers, BLE 4/5, PIV currently replacing potassium, SLP advanced diet to full liquid spoonfuls with supervision, up with A1 and gaitbelt, stroke PLC tomorrow at 1100, continue to monitor and follow POC

## 2021-06-24 NOTE — PROGRESS NOTES
[facility]    Stroke Progress Note    Interval Events  Isabell was admitted on 6/21 for right sided weakness, right facial droop and slurred speech. CT at 16:30 showed left sided basal ganglia intracranial hemorrhage, repeat CT at 22:30 showed a stable lesion, follow up imaging on 6/22 and 6/23 also show no changes. Isabell has been unable to void spontaneously for the past 2 days and has been straight cathed 4 times. Urine has bad smell - will get UA. Had difficulty swollowing yesterday and is now NPO except for meds. She had an episode of hypoglycemia this afternoon which was managed by floor staff with dextrose and glucagon, repeat blood sugar was 177.    Impression  Isabell Matthews is a 60-year-old woman with a PMH of insulin dependent diabetes mellitus, HTN, HLD, CAD (s/p PCI in 2004), CKD III, hypothyroidism, vascular dimentia with previous ischemic strokes associated with seizures. She presented with right sided facial droop, right sided weaknss and slurred speech. She was found to have an intracranial hemorrhage in the left basal ganglia. 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 Repeat CT and MRI show stable bleed. Given the MRI findings with deep microbleeds found in the chuck and thalamus, aspirin should be held for about  4 weeks. Her mental status has been waxing and waning the last two days and she is becoming increasingly somnolent, will monitor few a few days and see if this resolves.     Plan  Neuro  # Left Basal ganglia hemorraghic stroke, ICH score 1 on admission; etiology likely hypertensiv  - Repeat CT stable on 6/21, 6/22 and 6/23   - MRI shows stable intraparenchymal hemorrhage (6/22)  - Neuro checks q4  - SBP< 140  - PT/OT  - Hold Aspirin 4 weeks    -Repeat Head CT in 4 weeks to access stability of bleed before restarting  - F/u in neuro stroke clinic    #Hx of Seizures  - Continue PTA Keppra 500mg  BID    #Peripheral neuropathy  - Continue PTA Duloxetine 20mg daily  - Continue PTA Pregabalin 100mg BID    CV  #HTN  #HLD  #CAD s/p PCI (2004)  - Continue PTA atorvastatin 40 mg daily  - Continue PTA hydrochlorothiazide 25mg daily  - Continue PTA carvediol 3.125mg BID  - Continue PTA nifedipine 60mg daily   - Hold PTA aspirin in the setting of acute ICH    Pulm  DAMIAN  -Continuous pulse ox  -O2 goal >92%  -Supplemental O2 to maintain goals    GI  - SLP Eval 6/24: Liquid diet (nectar thick) with verified swallows, by spoon only.    - NS running at 75cc/hr   -Bowel regimen: polyethylene glycol and senna-doc    Renal   #CKD III (Cr 1.49)  - trend Cr (stable between 1.49 - 1.7 which is baseline)  - trend GFR (stable between  30 - 37 which is baseline)  - monitor I/Os, UOP stable at around 1ml/kg/hr    #Inability to void  -Straight cath for bladder volumes >600  -UA from 6/23 and 6/24 with some protein but no nitrites, leukocyte esterase, or  WBCs      Endo  #Insulin dependent diabetes mellitus  - A1C: 7.8  - Monitor blood sugars (have been between 150 and 380)  - Continue high insulin resistance dosing sliding scale correction insulin (Aspart)  - Decrease PTA glargine from 26 units at bedtime to 15 units while NPO    #Hypothyroidism  -Continue PTA levothyroxine 88mcg daily     ID  Afebrile, no leukocytosis, no indication for abx.  - Daily CBC - stable   - Trend temperature curve.    Heme  Hgb 12.4, plt 246  - Hgb goal >7, plt goal >100k  - Daily CBC    FEN: NPO except for meds  Ppx: DVT - pneumatic compression devices, hold chemoppx in the setting of acute ICH; GI - not indicated.  Code: Full.  Dispo: Acute rehab or home based PT     Key Turcios (MS4)    Resident/Fellow Attestation   I, BRITTNEY MARROQUIN, was present with the medical/BULMARO student who participated in the service and in the documentation of the note.  I have verified the history and personally performed the physical exam and medical decision making.  I  agree with the assessment and plan of care as documented in the note.      BRITTNEY MARROQUIN MD  PGY5  Date of Service (when I saw the patient): 06/24/21    ______________________________________________________    Medications   Home Meds  Prior to Admission medications    Medication Sig Start Date End Date Taking? Authorizing Provider   aspirin (ASA) 81 MG EC tablet Take 1 tablet (81 mg) by mouth daily 12/13/20   Bony Castaneda MD   atorvastatin (LIPITOR) 40 MG tablet Take 1 tablet (40 mg) by mouth daily 9/2/20   Bony Castaneda MD   blood glucose (NO BRAND SPECIFIED) test strip Use to test blood sugar 4-5 times daily or as directed. 11/2/20   Bony Castaneda MD   carvedilol (COREG) 3.125 MG tablet Take 1 tablet (3.125 mg) by mouth 2 times daily (with meals) 9/2/20   Bony Castaneda MD   clotrimazole (LOTRIMIN) 1 % external cream Apply topically 2 times daily Until rash resolved 11/2/20   Bony Castaneda MD   cyanocobalamin (VITAMIN B-12) 1000 MCG tablet Take 1 tablet (1,000 mcg) by mouth daily 9/2/20   Bony Castaneda MD   diclofenac (VOLTAREN) 1 % topical gel Apply 2 g topically 4 times daily as needed for moderate pain 2/25/21   Bony Castaneda MD   DULoxetine (CYMBALTA) 20 MG capsule Take 1 capsule (20 mg) by mouth daily 11/2/20   Bony Castaneda MD   hydrochlorothiazide (HYDRODIURIL) 25 MG tablet Taper to one half for one week then off if BP less than 140/90 12/16/20   Bony Castaneda MD   insulin aspart (NOVOLOG PEN) 100 UNIT/ML pen Use with meals and at bedtime per sliding scale and carb correction 1 unit for 10 g carbs,  Route: Inject 0-10 Units Subcutaneous 4 times daily (with meals and nightly) Use with meals and at bedtime per sliding scale and carb correction 1 unit for 10 g carbs.  Use 24-48 units per day. 12/7/20   Bony Castaneda MD   insulin glargine (LANTUS PEN) 100 UNIT/ML pen Inject 26 Units Subcutaneous At Bedtime 10/12/20   Shira Luna,  PA-C   insulin pen needle (31G X 8 MM) 31G X 8 MM miscellaneous Use 4 pen needles daily or as directed. 9/2/20   Bony Castaneda MD   levETIRAcetam (KEPPRA) 500 MG tablet Take 1 tablet (500 mg) by mouth 2 times daily 9/2/20   Bony Castaneda MD   loratadine (CLEAR-ATADINE) 10 MG tablet Take 10 mg by mouth daily    Reported, Patient   Melatonin 10 MG TABS tablet Take 10 mg by mouth At Bedtime    Unknown, Entered By History   NIFEdipine ER (ADALAT CC) 60 MG 24 hr tablet Take 1 tablet (60 mg) by mouth daily 9/2/20   Bony Castaneda MD   pregabalin (LYRICA) 100 MG capsule Take 1 capsule (100 mg) by mouth 2 times daily For additional refills, please schedule a follow-up appointment at [phone] 5/25/21   Bony Castaneda MD   SYNTHROID 88 MCG tablet Take 1 tablet (88 mcg) by mouth daily 9/2/20   Bony Castaneda MD   terbinafine (LAMISIL) 1 % external cream Apply topically to gume of rash twice daily if needed 2/25/21   Bony Castaneda MD   Vitamin D3 (CHOLECALCIFEROL) 25 mcg (1000 units) tablet Take 1 tablet (25 mcg) by mouth daily 9/2/20   Bony Castaneda MD       Scheduled Meds    atorvastatin  40 mg Oral or Feeding Tube Daily     carvedilol  3.125 mg Oral or Feeding Tube BID w/meals     cyanocobalamin  1,000 mcg Oral or Feeding Tube Daily     DULoxetine  20 mg Oral or Feeding Tube Daily     insulin aspart  1-10 Units Subcutaneous TID AC     insulin aspart  1-7 Units Subcutaneous At Bedtime     insulin glargine  26 Units Subcutaneous At Bedtime     levETIRAcetam  500 mg Oral or Feeding Tube BID     levothyroxine  88 mcg Oral or Feeding Tube Daily     loratadine  10 mg Oral or Feeding Tube Daily     NIFEdipine ER  60 mg Oral Daily     polyethylene glycol  17 g Oral or Feeding Tube Daily     potassium chloride  10 mEq Intravenous Q1H     pregabalin  100 mg Oral or Feeding Tube BID     senna-docusate  1-2 tablet Oral or Feeding Tube BID     Vitamin D3  25 mcg Oral or Feeding Tube Daily        Infusion Meds    sodium chloride 75 mL/hr at 06/24/21 0422       PRN Meds  acetaminophen **OR** acetaminophen **OR** acetaminophen, glucose **OR** dextrose **OR** glucagon, fentaNYL, labetalol, magnesium citrate, naloxone **OR** naloxone **OR** naloxone **OR** naloxone, ondansetron **OR** ondansetron       PHYSICAL EXAMINATION  Temp:  [97.2  F (36.2  C)-98  F (36.7  C)] 97.6  F (36.4  C)  Pulse:  [71-90] 78  Resp:  [12-16] 16  BP: ()/() 126/68  SpO2:  [93 %-97 %] 95 %     General:  patient lying in bed without any acute distress, increasingly somnolent   HEENT:  normocephalic/atraumatic  Cardio:  RRR  Pulmonary:  no respiratory distress  Abdomen:  soft, non-tender  Extremities:  no edema  Skin:  warm/dry      Neuro:  MS: rousable, oriented to person, place and circumstance. Aware of year intermittently (but this is not new). She follows simple commands and can repeat phrases. Naming is intact.  CN: Blinks bilaterally to threat, PEERL, EOMI, right lower facial droop improving. Slight asymmetry with smile on right. Palate elevates symmetrically, uvula midline, can close both eyes and puff out cheeks. Hearing is intact to conversation. Facial sensation intact.  Motor: 5/5 strength in LUE, LLE, and RLE. There is mild drift in the RUE that is gradually improving, strength is 5-/5.  Sensory: intact to light touch in 4/4 extremities, no extinction.  Reflexes: 2+ and symmetric, no castillo, toes down going.  Gait: deferred     Stroke Scales  ICH score 1 on admit (for GCS 5-12), NIHSS score of 15. Scores today are 0 and   National Institutes of Health Stroke Scale   Score    Level of consciousness: (1)   Not alert; arousable w/ minor stim to obey/answer/respond    LOC questions: (0)   Answers both questions correctly    LOC commands: (0)   Performs both tasks correctly    Best gaze: (0)   Normal    Visual: (0)   No visual loss    Facial palsy: (1)   Minor paralysis (flat nasolabial fold, smile asymmetry)     Motor arm (left): (0)   No Drift    Motor arm (right): (1)   Drift    Motor leg (left): (0)   No drift    Motor leg (right): (0)   No drift    Limb ataxia: (0)   Absent    Sensory: (0)   Normal- no sensory loss    Best language: (0)   Normal- no aphasia    Dysarthria: (1)   Mild to moderate dysarthria    Extinction and inattention: (0)   No abnormality        Total Score:  4       On admission: 15       ICH Score Tool Patients Score   Age ? 80 years 1 point    GCS score  3-4  5-12   13-15    2 point  1 point  0 point 0   Hematoma volume, cm3 ? 30 1 point 0   Intraventricular extension 1 point 0   Infratentorial location 1 point 0   Patient s ICH Score (0-6) = 0           On admission: 1      Imaging  I personally reviewed all imaging; relevant findings per HPI.     MRI on 6/21  CT on 6/21, 6/22, 6/23    Lab Results Data   CBC  Recent Labs   Lab 06/24/21  0718 06/23/21  0654 06/22/21  0426   WBC 6.3 5.3 5.6   RBC 4.54 4.91 4.43   HGB 12.4 13.4 12.0   HCT 39.6 42.5 38.0    254 250     Basic Metabolic Panel    Recent Labs   Lab 06/24/21  0718 06/23/21  0654 06/22/21  0426    140 140   POTASSIUM 3.0* 3.2* 3.0*   CHLORIDE 109 107 105   CO2 27 27 28   BUN 22 25 24   CR 1.62* 1.49* 1.56*   * 156* 171*   VERONICA 9.1 9.3 8.8     Liver Panel  No results for input(s): PROTTOTAL, ALBUMIN, BILITOTAL, ALKPHOS, AST, ALT, BILIDIRECT in the last 168 hours.  INR    Recent Labs   Lab Test 06/21/21  1618 09/27/20  1601   INR 0.91 0.99      Lipid Profile  No lab results found.  A1C    Recent Labs   Lab Test 06/21/21 2011 09/27/20  1601   A1C 7.8* 11.7*     Troponin I    Recent Labs   Lab 06/21/21  1618   TROPI <0.015

## 2021-06-24 NOTE — PLAN OF CARE
PT: cancel - attempted x2 in AM at scheduled family training time and later; pt sleeping and daughter not present. Pt busy with OT in PM and not able to check back 2/2 schedule constraints. Will reschedule and attempt family training tomorrow AM as able.

## 2021-06-24 NOTE — PLAN OF CARE
Status: Admitted for left basel ganglia ICH. Hx DM II, HTN, HLD, CAD, CKD III, vascular dementia, seizures, previous CVA.  Vitals: VSS. CCM. RA. BP remained stable this shift. Did not require Labetalol.  Neuros: A&Ox3 to self, place, & situation, not date. Flat affect & slow whispered speech. BUE 5/5, BLE 4/5.  IV: PIV infusing potassium 100 ml/hr.  Labs/Electrolytes: 25 ml dextrose for BG 49. BG up to 177. Potassium chloride infusing 100 ml/hr for low potassium.  Resp/trach: RA. WNL.  Diet: NPO. Pt had a hard time swallowing meds this morning. Order for NG tube to be placed.  Bowel status: No BM this shift. LBM 6/23/2021.  : Pt unable to void. Straight cath 1x this shift at 1320 for 250cc output. UA sent.  Skin: WNL.  Pain: Denies.  Activity: Ax1 w/ GB.  Social: Daughter at bedside.  Plan: Possible NG tube placement for tomorrow. MD to hold NG tube placement for today. IV meds to be ordered for tonight. Stroke PLC rescheduled for tomorrow 6/25 11AM w/ daughter. Plan to discharge to TCU when medically stable.  Updates this shift: OT got pt up to the bathroom. Attempted for shower but pt unable.

## 2021-06-25 ENCOUNTER — APPOINTMENT (OUTPATIENT)
Dept: PHYSICAL THERAPY | Facility: CLINIC | Age: 61
DRG: 064 | End: 2021-06-25
Payer: MEDICARE

## 2021-06-25 ENCOUNTER — APPOINTMENT (OUTPATIENT)
Dept: OCCUPATIONAL THERAPY | Facility: CLINIC | Age: 61
DRG: 064 | End: 2021-06-25
Payer: MEDICARE

## 2021-06-25 ENCOUNTER — APPOINTMENT (OUTPATIENT)
Dept: SPEECH THERAPY | Facility: CLINIC | Age: 61
DRG: 064 | End: 2021-06-25
Payer: MEDICARE

## 2021-06-25 LAB
ANION GAP SERPL CALCULATED.3IONS-SCNC: 5 MMOL/L (ref 3–14)
BUN SERPL-MCNC: 23 MG/DL (ref 7–30)
CALCIUM SERPL-MCNC: 8.8 MG/DL (ref 8.5–10.1)
CHLORIDE SERPL-SCNC: 109 MMOL/L (ref 94–109)
CO2 SERPL-SCNC: 27 MMOL/L (ref 20–32)
CREAT SERPL-MCNC: 1.52 MG/DL (ref 0.52–1.04)
ERYTHROCYTE [DISTWIDTH] IN BLOOD BY AUTOMATED COUNT: 15.5 % (ref 10–15)
GFR SERPL CREATININE-BSD FRML MDRD: 36 ML/MIN/{1.73_M2}
GLUCOSE BLDC GLUCOMTR-MCNC: 148 MG/DL (ref 70–99)
GLUCOSE BLDC GLUCOMTR-MCNC: 186 MG/DL (ref 70–99)
GLUCOSE BLDC GLUCOMTR-MCNC: 280 MG/DL (ref 70–99)
GLUCOSE BLDC GLUCOMTR-MCNC: 376 MG/DL (ref 70–99)
GLUCOSE SERPL-MCNC: 70 MG/DL (ref 70–99)
HCT VFR BLD AUTO: 36 % (ref 35–47)
HGB BLD-MCNC: 11.5 G/DL (ref 11.7–15.7)
MCH RBC QN AUTO: 27.5 PG (ref 26.5–33)
MCHC RBC AUTO-ENTMCNC: 31.9 G/DL (ref 31.5–36.5)
MCV RBC AUTO: 86 FL (ref 78–100)
PLATELET # BLD AUTO: 241 10E9/L (ref 150–450)
POTASSIUM SERPL-SCNC: 3.2 MMOL/L (ref 3.4–5.3)
RBC # BLD AUTO: 4.18 10E12/L (ref 3.8–5.2)
SODIUM SERPL-SCNC: 141 MMOL/L (ref 133–144)
WBC # BLD AUTO: 5.9 10E9/L (ref 4–11)

## 2021-06-25 PROCEDURE — 80048 BASIC METABOLIC PNL TOTAL CA: CPT | Performed by: PSYCHIATRY & NEUROLOGY

## 2021-06-25 PROCEDURE — 85027 COMPLETE CBC AUTOMATED: CPT | Performed by: PSYCHIATRY & NEUROLOGY

## 2021-06-25 PROCEDURE — 250N000011 HC RX IP 250 OP 636: Performed by: STUDENT IN AN ORGANIZED HEALTH CARE EDUCATION/TRAINING PROGRAM

## 2021-06-25 PROCEDURE — 92526 ORAL FUNCTION THERAPY: CPT | Mod: GN

## 2021-06-25 PROCEDURE — 250N000013 HC RX MED GY IP 250 OP 250 PS 637: Performed by: PSYCHIATRY & NEUROLOGY

## 2021-06-25 PROCEDURE — 258N000003 HC RX IP 258 OP 636: Performed by: STUDENT IN AN ORGANIZED HEALTH CARE EDUCATION/TRAINING PROGRAM

## 2021-06-25 PROCEDURE — 36415 COLL VENOUS BLD VENIPUNCTURE: CPT | Performed by: PSYCHIATRY & NEUROLOGY

## 2021-06-25 PROCEDURE — 999N001017 HC STATISTIC GLUCOSE BY METER IP

## 2021-06-25 PROCEDURE — 84132 ASSAY OF SERUM POTASSIUM: CPT | Performed by: PSYCHIATRY & NEUROLOGY

## 2021-06-25 PROCEDURE — 99232 SBSQ HOSP IP/OBS MODERATE 35: CPT | Mod: GC | Performed by: PSYCHIATRY & NEUROLOGY

## 2021-06-25 PROCEDURE — 120N000002 HC R&B MED SURG/OB UMMC

## 2021-06-25 PROCEDURE — 97535 SELF CARE MNGMENT TRAINING: CPT | Mod: GO

## 2021-06-25 PROCEDURE — 97530 THERAPEUTIC ACTIVITIES: CPT | Mod: GP

## 2021-06-25 PROCEDURE — 250N000013 HC RX MED GY IP 250 OP 250 PS 637: Performed by: STUDENT IN AN ORGANIZED HEALTH CARE EDUCATION/TRAINING PROGRAM

## 2021-06-25 PROCEDURE — 97116 GAIT TRAINING THERAPY: CPT | Mod: GP

## 2021-06-25 RX ORDER — HEPARIN SODIUM 5000 [USP'U]/.5ML
5000 INJECTION, SOLUTION INTRAVENOUS; SUBCUTANEOUS EVERY 8 HOURS
Status: DISCONTINUED | OUTPATIENT
Start: 2021-06-25 | End: 2021-06-27 | Stop reason: HOSPADM

## 2021-06-25 RX ORDER — DOXAZOSIN 1 MG/1
1 TABLET ORAL DAILY
Status: DISCONTINUED | OUTPATIENT
Start: 2021-06-25 | End: 2021-06-27 | Stop reason: HOSPADM

## 2021-06-25 RX ORDER — CAPTOPRIL 12.5 MG/1
12.5 TABLET ORAL 2 TIMES DAILY
Status: DISCONTINUED | OUTPATIENT
Start: 2021-06-25 | End: 2021-06-25

## 2021-06-25 RX ORDER — CARVEDILOL 6.25 MG/1
6.25 TABLET ORAL 2 TIMES DAILY WITH MEALS
Status: DISCONTINUED | OUTPATIENT
Start: 2021-06-25 | End: 2021-06-27 | Stop reason: HOSPADM

## 2021-06-25 RX ORDER — POTASSIUM CHLORIDE 1.5 G/1.58G
20 POWDER, FOR SOLUTION ORAL ONCE
Status: COMPLETED | OUTPATIENT
Start: 2021-06-25 | End: 2021-06-25

## 2021-06-25 RX ADMIN — LABETALOL HYDROCHLORIDE 10 MG: 5 INJECTION, SOLUTION INTRAVENOUS at 05:43

## 2021-06-25 RX ADMIN — LABETALOL HYDROCHLORIDE 10 MG: 5 INJECTION, SOLUTION INTRAVENOUS at 04:38

## 2021-06-25 RX ADMIN — ACETAMINOPHEN 650 MG: 325 TABLET, FILM COATED ORAL at 17:04

## 2021-06-25 RX ADMIN — LABETALOL HYDROCHLORIDE 10 MG: 5 INJECTION, SOLUTION INTRAVENOUS at 04:22

## 2021-06-25 RX ADMIN — Medication 25 MCG: at 07:59

## 2021-06-25 RX ADMIN — DOXAZOSIN 1 MG: 1 TABLET ORAL at 09:23

## 2021-06-25 RX ADMIN — PREGABALIN 100 MG: 100 CAPSULE ORAL at 19:57

## 2021-06-25 RX ADMIN — LABETALOL HYDROCHLORIDE 10 MG: 5 INJECTION, SOLUTION INTRAVENOUS at 05:04

## 2021-06-25 RX ADMIN — CARVEDILOL 6.25 MG: 6.25 TABLET, FILM COATED ORAL at 07:59

## 2021-06-25 RX ADMIN — POTASSIUM CHLORIDE 20 MEQ: 1.5 POWDER, FOR SOLUTION ORAL at 12:40

## 2021-06-25 RX ADMIN — NIFEDIPINE 60 MG: 60 TABLET, EXTENDED RELEASE ORAL at 07:52

## 2021-06-25 RX ADMIN — LABETALOL HYDROCHLORIDE 10 MG: 5 INJECTION, SOLUTION INTRAVENOUS at 05:57

## 2021-06-25 RX ADMIN — LABETALOL HYDROCHLORIDE 10 MG: 5 INJECTION, SOLUTION INTRAVENOUS at 05:26

## 2021-06-25 RX ADMIN — CAPTOPRIL 12.5 MG: 12.5 TABLET ORAL at 09:22

## 2021-06-25 RX ADMIN — LEVETIRACETAM 500 MG: 500 TABLET, FILM COATED ORAL at 07:55

## 2021-06-25 RX ADMIN — CARVEDILOL 6.25 MG: 6.25 TABLET, FILM COATED ORAL at 19:57

## 2021-06-25 RX ADMIN — HEPARIN SODIUM 5000 UNITS: 5000 INJECTION, SOLUTION INTRAVENOUS; SUBCUTANEOUS at 15:00

## 2021-06-25 RX ADMIN — LEVOTHYROXINE SODIUM 88 MCG: 0.09 TABLET ORAL at 07:59

## 2021-06-25 RX ADMIN — LEVETIRACETAM 500 MG: 500 TABLET, FILM COATED ORAL at 19:58

## 2021-06-25 RX ADMIN — ATORVASTATIN CALCIUM 40 MG: 40 TABLET, FILM COATED ORAL at 07:54

## 2021-06-25 RX ADMIN — PREGABALIN 100 MG: 100 CAPSULE ORAL at 08:11

## 2021-06-25 RX ADMIN — HEPARIN SODIUM 5000 UNITS: 5000 INJECTION, SOLUTION INTRAVENOUS; SUBCUTANEOUS at 22:18

## 2021-06-25 RX ADMIN — SODIUM CHLORIDE 250 ML: 9 INJECTION, SOLUTION INTRAVENOUS at 12:32

## 2021-06-25 RX ADMIN — DULOXETINE HYDROCHLORIDE 20 MG: 20 CAPSULE, DELAYED RELEASE ORAL at 07:59

## 2021-06-25 RX ADMIN — CYANOCOBALAMIN TAB 1000 MCG 1000 MCG: 1000 TAB at 07:47

## 2021-06-25 RX ADMIN — LORATADINE 10 MG: 10 TABLET ORAL at 07:59

## 2021-06-25 ASSESSMENT — ACTIVITIES OF DAILY LIVING (ADL)
ADLS_ACUITY_SCORE: 25
ADLS_ACUITY_SCORE: 26

## 2021-06-25 ASSESSMENT — VISUAL ACUITY: OU: OTHER (SEE COMMENT)

## 2021-06-25 NOTE — PROGRESS NOTES
Care Management Follow Up    Length of Stay (days): 4    Expected Discharge Date: 06/26/21     Concerns to be Addressed: (Disposition planning)     Patient plan of care discussed at interdisciplinary rounds: Yes    Anticipated Discharge Disposition: Transitional Care     Anticipated Discharge Services: (Short term TCU placement)  Anticipated Discharge DME:  Not applicable    Patient/family educated on Medicare website which has current facility and service quality ratings:  Yes  Education Provided on the Discharge Plan: Yes   Patient/Family in Agreement with the Plan: yes    Referrals Placed by CM/SW:  Post Acute Care Facility's  Private pay costs discussed:  Not applicable    Additional Information:  SW is following pt for discharge planning.  TCU placement is recommended.   SW attended am unit rounds and per Dr. Blankenship, pt will not have a NG placed.    Per Dr Blankenship, discharge is anticipated tomorrow.      KINZA met with pt and daughter (Maria Guadalupe).  KINZA provided Maria Guadalupe with the phone number to [facility] to schedule assess and provided a list of Adult Day Care Programs (as per Maria Guadalupe's request).    KINZA updated Maria Guadalupe (no NG, discharge anticipated tomorrow) and provided Medicare Care Compare list.  Maria Guadalupe requested referrals to  (listed in order of preference):  - [facility], KINZA phoned Admissions (Cheri) and referred pt  - [facility] (Epic referral made)  - [facility] (Epic referral made)  - Health system (Epic referral made)  - [facility] (Epic referral made).    SW will follow for discharge planning.    MONICA Lino  Social Work, 6A  Phone:  [phone]  Pager:  [phone]  6/25/2021          CAMILO James

## 2021-06-25 NOTE — PROGRESS NOTES
[facility]    Stroke Progress Note    Isabell was admitted on 6/21 for right sided weakness, right facial droop and slurred speech. CT at 16:30 showed left sided basal ganglia intracranial hemorrhage, repeat CT at 22:30 showed a stable lesion, follow up imaging on 6/22 and 6/23 also show no changes.    Interval Events  Isabell has been unable to void spontaneously for the past 3 days and has been straight cathed multiple times.  Had a bowel movement overnight. She was somnolent last evening, but was able to interact with speech who cleared her for liquid diet yesterday evening, and a NDD 2 diet this morning.  Had a few BP reads in the 150s/70s last night and this morning, was given PRN labetalol x6 to keep SBP<140. Much more interactive today. Fully participates in exam. Pleasant affect.     Impression  Isabell Matthews is a 60-year-old woman with a PMH of insulin dependent diabetes mellitus, HTN, HLD, CAD (s/p PCI in 2004), CKD III, hypothyroidism, vascular dimentia with previous ischemic strokes associated with seizures. She presented with right sided facial droop, right sided weaknss and slurred speech. She was found to have an intracranial hemorrhage in the left basal ganglia. 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 Repeat CT and MRI show stable bleed. Given the MRI findings with deep microbleeds found in the chuck and thalamus, aspirin should be held for 4 weeks. Repeat CT should be done before restarting aspirin.     Plan  Neuro  # Left Basal ganglia hemorraghic stroke, ICH score 1 on admission; etiology likely hypertensive  - Repeat CT stable on 6/21, 6/22 and 6/23   - MRI shows stable intraparenchymal hemorrhage (6/22)  - Neuro checks q4  - Blood pressure goal: Normotension, give IV antihypertensive medications if SBP is greater than 160mmHg  - PT/OT  - Hold Aspirin till 7/21/2021   -Repeat Head CT on 7/20/2021 to  access stability of bleed before restarting ASA  - F/u in neuro stroke clinic    #Hx of Seizures  - Continue PTA Keppra 500mg BID    #Peripheral neuropathy  - Continue PTA Duloxetine 20mg daily  - Continue PTA Pregabalin 100mg BID    CV  #HTN  #HLD  #CAD s/p PCI (2004)  - Blood pressure goal: Normotension, give IV antihypertensive medications if SBP is greater than 160mmHg  - Continue PTA atorvastatin 40 mg daily  - PTA antihypertensive medications:   Coreg 3.125 mg twice daily   Hydrochlorothiazide 25 mg daily    - Current Anti-hypertensives:   Coreg 6.25 mg twice daily (increased 6/25/2021)   Nifedipine ER 60 mg daily (started 6/21/2021)   Doxazosin 1 mg daily (added given concomitant urinary retention)   Captopril 12.5 mg twice daily (given 1 dose the morning of 6/25/2021 with subsequent hypotension, discontinued)  - Hold PTA aspirin in the setting of acute ICH as detailed above    Pulm  DAMIAN    GI  - SLP Eval 6/25: NDD2     Renal   #CKD III (Cr 1.49)  DAMIAN    # Neurogenic Bladder  - Straight cath for bladder volumes >700  - Start Doxazosin 1mg daily  - UA from 6/23 and 6/24 with some protein but no nitrites, leukocyte esterase, or  WBCs      Endo  #Insulin dependent diabetes mellitus  - A1C: 7.8  - Monitor blood sugars (have been between 150 and 380)  - Continue high insulin resistance dosing sliding scale correction insulin (Aspart)  - PTA Glargine to 26 units at betime.     #Hypothyroidism  -Continue PTA levothyroxine 88mcg daily     ID  DAMIAN    Heme  DAMIAN     FEN: NDD2   Ppx: DVT - SQH  Code: Full.  Dispo: TCU    [facility] [place] (MS4)    Resident/Fellow Attestation   I, BRITTNEY MARROQUIN, was present with the medical/BULMARO student who participated in the service and in the documentation of the note.  I have verified the history and personally performed the physical exam and medical decision making.  I agree with the assessment and plan of care as documented in the note.        BRITTNEY MARROQUIN MD  PGY5  Date of Service  (when I saw the patient): 06/25/21    ______________________________________________________    Medications   Home Meds  Prior to Admission medications    Medication Sig Start Date End Date Taking? Authorizing Provider   aspirin (ASA) 81 MG EC tablet Take 1 tablet (81 mg) by mouth daily 12/13/20   Bony Castaneda MD   atorvastatin (LIPITOR) 40 MG tablet Take 1 tablet (40 mg) by mouth daily 9/2/20   Bony Castaneda MD   blood glucose (NO BRAND SPECIFIED) test strip Use to test blood sugar 4-5 times daily or as directed. 11/2/20   Bony Castaneda MD   carvedilol (COREG) 3.125 MG tablet Take 1 tablet (3.125 mg) by mouth 2 times daily (with meals) 9/2/20   Bony Castaneda MD   clotrimazole (LOTRIMIN) 1 % external cream Apply topically 2 times daily Until rash resolved 11/2/20   Bony Castaneda MD   cyanocobalamin (VITAMIN B-12) 1000 MCG tablet Take 1 tablet (1,000 mcg) by mouth daily 9/2/20   Bony Castaneda MD   diclofenac (VOLTAREN) 1 % topical gel Apply 2 g topically 4 times daily as needed for moderate pain 2/25/21   Bony Castaneda MD   DULoxetine (CYMBALTA) 20 MG capsule Take 1 capsule (20 mg) by mouth daily 11/2/20   Bony Castaneda MD   hydrochlorothiazide (HYDRODIURIL) 25 MG tablet Taper to one half for one week then off if BP less than 140/90 12/16/20   Bony Castaneda MD   insulin aspart (NOVOLOG PEN) 100 UNIT/ML pen Use with meals and at bedtime per sliding scale and carb correction 1 unit for 10 g carbs,  Route: Inject 0-10 Units Subcutaneous 4 times daily (with meals and nightly) Use with meals and at bedtime per sliding scale and carb correction 1 unit for 10 g carbs.  Use 24-48 units per day. 12/7/20   Bony Castaneda MD   insulin glargine (LANTUS PEN) 100 UNIT/ML pen Inject 26 Units Subcutaneous At Bedtime 10/12/20   Shira Luna PA-C   insulin pen needle (31G X 8 MM) 31G X 8 MM miscellaneous Use 4 pen needles daily or as directed. 9/2/20    Bony Castaneda MD   levETIRAcetam (KEPPRA) 500 MG tablet Take 1 tablet (500 mg) by mouth 2 times daily 9/2/20   Bony Castaneda MD   loratadine (CLEAR-ATADINE) 10 MG tablet Take 10 mg by mouth daily    Reported, Patient   Melatonin 10 MG TABS tablet Take 10 mg by mouth At Bedtime    Unknown, Entered By History   NIFEdipine ER (ADALAT CC) 60 MG 24 hr tablet Take 1 tablet (60 mg) by mouth daily 9/2/20   Bony Castaneda MD   pregabalin (LYRICA) 100 MG capsule Take 1 capsule (100 mg) by mouth 2 times daily For additional refills, please schedule a follow-up appointment at [phone] 5/25/21   Bony Castaneda MD   SYNTHROID 88 MCG tablet Take 1 tablet (88 mcg) by mouth daily 9/2/20   Bony Castaneda MD   terbinafine (LAMISIL) 1 % external cream Apply topically to gume of rash twice daily if needed 2/25/21   Bony Castaneda MD   Vitamin D3 (CHOLECALCIFEROL) 25 mcg (1000 units) tablet Take 1 tablet (25 mcg) by mouth daily 9/2/20   Bony Castaneda MD       Scheduled Meds    atorvastatin  40 mg Oral or Feeding Tube Daily     captopril  12.5 mg Oral BID     carvedilol  6.25 mg Oral or Feeding Tube BID w/meals     cyanocobalamin  1,000 mcg Oral or Feeding Tube Daily     doxazosin  1 mg Oral Daily     DULoxetine  20 mg Oral or Feeding Tube Daily     insulin aspart  1-10 Units Subcutaneous TID AC     insulin aspart  1-7 Units Subcutaneous At Bedtime     insulin glargine  20 Units Subcutaneous At Bedtime     levETIRAcetam  500 mg Oral or Feeding Tube BID     levothyroxine  88 mcg Oral or Feeding Tube Daily     loratadine  10 mg Oral or Feeding Tube Daily     NIFEdipine ER  60 mg Oral Daily     polyethylene glycol  17 g Oral or Feeding Tube Daily     pregabalin  100 mg Oral or Feeding Tube BID     senna-docusate  1-2 tablet Oral or Feeding Tube BID     Vitamin D3  25 mcg Oral or Feeding Tube Daily       PRN Meds  acetaminophen **OR** acetaminophen **OR** acetaminophen, glucose **OR** dextrose  **OR** glucagon, magnesium citrate, ondansetron **OR** ondansetron       PHYSICAL EXAMINATION  Temp:  [97.6  F (36.4  C)-99.3  F (37.4  C)] 98.3  F (36.8  C)  Pulse:  [] 79  Resp:  [16-20] 16  BP: (115-161)/(66-77) 129/73  SpO2:  [94 %-98 %] 97 %     General:  patient lying in bed without any acute distress, increasingly somnolent   HEENT:  normocephalic/atraumatic  Cardio:  RRR  Pulmonary:  no respiratory distress  Abdomen:  soft, non-tender  Extremities:  no edema  Skin:  warm/dry      Neuro:  MS: alert, oriented to person, place and circumstance. Aware of year intermittently (but this is not new). She follows simple commands and can repeat phrases. Naming is intact.  CN: PEERL, EOMI, right lower facial droop improving. Slight asymmetry with smile on right. Palate elevates symmetrically, uvula midline, can close both eyes and puff out cheeks. Hearing is intact to conversation. Facial sensation intact.  Motor: 5/5 strength in LUE, LLE, and RLE. There is mild drift in the RUE that continues to improve, strength is 5-/5.  Sensory: intact to light touch in 4/4 extremities, no extinction. Decreased proprioception in BL big toes, decreased vibratory sense as well. Intact at ankle.  Reflexes: 2+ and symmetric, no castillo, toes down going.  Gait: deferred     Stroke Scales  ICH score 1 on admit (for GCS 5-12), NIHSS score of 15. Scores today are 0 and 2    National Institutes of Health Stroke Scale  Exam Interval: 4 days   Score    Level of consciousness: (0)   Alert, keenly responsive    LOC questions: (0)   Answers both questions correctly    LOC commands: (0)   Performs both tasks correctly    Best gaze: (0)   Normal    Visual: (0)   No visual loss    Facial palsy: (1)   Minor paralysis (flat nasolabial fold, smile asymmetry)    Motor arm (left): (0) no drift    Motor arm (right): (1)   Drift    Motor leg (left): (0)   No drift    Motor leg (right): (0)   No drift    Limb ataxia: (0)   Absent    Sensory: (0)    Normal- no sensory loss    Best language: (0)   Normal- no aphasia    Dysarthria: (0)   Normal    Extinction and inattention: (0)   No abnormality        Total Score:  2      ON addmision: 15          ICH Score Tool Patients Score   Age ? 80 years 1 point    GCS score  3-4  5-12   13-15    2 point  1 point  0 point 0   Hematoma volume, cm3 ? 30 1 point 0   Intraventricular extension 1 point 0   Infratentorial location 1 point 0   Patient s ICH Score (0-6) = 0           On admission: 1      Imaging  I personally reviewed all imaging; relevant findings per HPI.     MRI on 6/21  CT on 6/21, 6/22, 6/23    Lab Results Data   CBC  Recent Labs   Lab 06/25/21  0610 06/24/21  0718 06/23/21  0654   WBC 5.9 6.3 5.3   RBC 4.18 4.54 4.91   HGB 11.5* 12.4 13.4   HCT 36.0 39.6 42.5    246 254     Basic Metabolic Panel    Recent Labs   Lab 06/25/21  0610 06/24/21  2034 06/24/21  0718 06/23/21  0654     --  141 140   POTASSIUM 3.2* 4.8 3.0* 3.2*   CHLORIDE 109  --  109 107   CO2 27  --  27 27   BUN 23  --  22 25   CR 1.52*  --  1.62* 1.49*   GLC 70  --  120* 156*   VERONICA 8.8  --  9.1 9.3     Liver Panel  No results for input(s): PROTTOTAL, ALBUMIN, BILITOTAL, ALKPHOS, AST, ALT, BILIDIRECT in the last 168 hours.  INR    Recent Labs   Lab Test 06/21/21  1618 09/27/20  1601   INR 0.91 0.99      Lipid Profile  No lab results found.  A1C    Recent Labs   Lab Test 06/21/21 2011 09/27/20  1601   A1C 7.8* 11.7*     Troponin I    Recent Labs   Lab 06/21/21  1618   TROPI <0.015

## 2021-06-25 NOTE — PLAN OF CARE
Status: Admitted for L basel ganglia ICH. Hx DM II, HTN, HLD, CAD, CKD III, vascular dementia, seizures, previous CVA.  Vitals: Pt BP dropped to 92/48. NS Bolus administered. BP remains soft. CCM.  Neuros: A&Ox3, not time. Pt reported R vision change. Neuro team notified. Flat affect. Slow speech. 4/5 strength throughout.   IV: PIV SL.  Labs/Electrolytes: K+ 3.2. Recheck lab after potassium chloride packets.  Resp: RA. WNL.  Diet: DDL2. Thin Liq.   Bowel status: Small BM this shift. .  : Due to void. Bladder scan @ 1350 for 169cc. Pt attempted to use bathroom.  Skin: WNL.  Pain: Pt mention posterior neck pain.  Activity: Ax1 w/ GB & walker. Pt up in chair for breakfast & lunch.  Social: Daughter at bedside, involved in pt care.  Plan: Stroke PLC rescheduled to Monday 6/28 1030. SW following for discharge to TCU. Expected discharge for tomorrow 6/26.

## 2021-06-25 NOTE — PLAN OF CARE
Status: Admitted for left basel ganglia ICH stroke  Vitals: HTN within parameters. CCM. RA. Labetalol x6 to keep SBP <140  Neuros: Waxes and wanes. Alert and  Oriented to confused. Flat affect slow speech. Strength 4/5 throughout  IV: PIV infusing NS @ 75   Labs/Electrolytes: K+ 4.8 @2030. Recheck in AM  Resp/trach: LS clear  Diet: full liquid, nectar thickened. Excellent PO intake  Bowel status: Passing gas. LBM 6/23/2021.  : Due to void.  Skin: WNL.  Pain: Denies.  Activity: A1 + GB+ Walker  Social: Daughter at bedside- helpful with cares  Plan: Stroke PLC rescheduled for today @ 1100- daughter will be present. Plan to discharge to TCU when medically stable.  Updates this shift: bedtime glargine adjusted for new PO diet and intake    Addendum: Pt incontinent overnight, Straight cathed @ 0650 for 750cc.

## 2021-06-26 ENCOUNTER — APPOINTMENT (OUTPATIENT)
Dept: SPEECH THERAPY | Facility: CLINIC | Age: 61
DRG: 064 | End: 2021-06-26
Payer: MEDICARE

## 2021-06-26 LAB
ANION GAP SERPL CALCULATED.3IONS-SCNC: 6 MMOL/L (ref 3–14)
BUN SERPL-MCNC: 21 MG/DL (ref 7–30)
CALCIUM SERPL-MCNC: 9.4 MG/DL (ref 8.5–10.1)
CHLORIDE SERPL-SCNC: 108 MMOL/L (ref 94–109)
CO2 SERPL-SCNC: 25 MMOL/L (ref 20–32)
CREAT SERPL-MCNC: 1.62 MG/DL (ref 0.52–1.04)
ERYTHROCYTE [DISTWIDTH] IN BLOOD BY AUTOMATED COUNT: 15.4 % (ref 10–15)
GFR SERPL CREATININE-BSD FRML MDRD: 33 ML/MIN/{1.73_M2}
GLUCOSE BLDC GLUCOMTR-MCNC: 107 MG/DL (ref 70–99)
GLUCOSE BLDC GLUCOMTR-MCNC: 123 MG/DL (ref 70–99)
GLUCOSE BLDC GLUCOMTR-MCNC: 202 MG/DL (ref 70–99)
GLUCOSE BLDC GLUCOMTR-MCNC: 330 MG/DL (ref 70–99)
GLUCOSE SERPL-MCNC: 172 MG/DL (ref 70–99)
HCT VFR BLD AUTO: 37.6 % (ref 35–47)
HGB BLD-MCNC: 11.8 G/DL (ref 11.7–15.7)
MAGNESIUM SERPL-MCNC: 2.1 MG/DL (ref 1.6–2.3)
MCH RBC QN AUTO: 27.3 PG (ref 26.5–33)
MCHC RBC AUTO-ENTMCNC: 31.4 G/DL (ref 31.5–36.5)
MCV RBC AUTO: 87 FL (ref 78–100)
PHOSPHATE SERPL-MCNC: 2.8 MG/DL (ref 2.5–4.5)
PLATELET # BLD AUTO: 248 10E9/L (ref 150–450)
POTASSIUM SERPL-SCNC: 4 MMOL/L (ref 3.4–5.3)
POTASSIUM SERPL-SCNC: 4 MMOL/L (ref 3.4–5.3)
POTASSIUM SERPL-SCNC: 5.2 MMOL/L (ref 3.4–5.3)
RBC # BLD AUTO: 4.32 10E12/L (ref 3.8–5.2)
SODIUM SERPL-SCNC: 140 MMOL/L (ref 133–144)
WBC # BLD AUTO: 5.8 10E9/L (ref 4–11)

## 2021-06-26 PROCEDURE — 999N001017 HC STATISTIC GLUCOSE BY METER IP

## 2021-06-26 PROCEDURE — 36415 COLL VENOUS BLD VENIPUNCTURE: CPT | Performed by: PSYCHIATRY & NEUROLOGY

## 2021-06-26 PROCEDURE — 99232 SBSQ HOSP IP/OBS MODERATE 35: CPT | Mod: GC | Performed by: PSYCHIATRY & NEUROLOGY

## 2021-06-26 PROCEDURE — 85027 COMPLETE CBC AUTOMATED: CPT | Performed by: PSYCHIATRY & NEUROLOGY

## 2021-06-26 PROCEDURE — 80048 BASIC METABOLIC PNL TOTAL CA: CPT | Performed by: PSYCHIATRY & NEUROLOGY

## 2021-06-26 PROCEDURE — 84132 ASSAY OF SERUM POTASSIUM: CPT | Performed by: PSYCHIATRY & NEUROLOGY

## 2021-06-26 PROCEDURE — 84100 ASSAY OF PHOSPHORUS: CPT | Performed by: PSYCHIATRY & NEUROLOGY

## 2021-06-26 PROCEDURE — 250N000011 HC RX IP 250 OP 636: Performed by: STUDENT IN AN ORGANIZED HEALTH CARE EDUCATION/TRAINING PROGRAM

## 2021-06-26 PROCEDURE — 92526 ORAL FUNCTION THERAPY: CPT | Mod: GN

## 2021-06-26 PROCEDURE — 83735 ASSAY OF MAGNESIUM: CPT | Performed by: PSYCHIATRY & NEUROLOGY

## 2021-06-26 PROCEDURE — 120N000002 HC R&B MED SURG/OB UMMC

## 2021-06-26 PROCEDURE — 250N000013 HC RX MED GY IP 250 OP 250 PS 637: Performed by: STUDENT IN AN ORGANIZED HEALTH CARE EDUCATION/TRAINING PROGRAM

## 2021-06-26 PROCEDURE — 250N000013 HC RX MED GY IP 250 OP 250 PS 637: Performed by: PSYCHIATRY & NEUROLOGY

## 2021-06-26 RX ORDER — NIFEDIPINE 90 MG/1
90 TABLET, EXTENDED RELEASE ORAL DAILY
Status: DISCONTINUED | OUTPATIENT
Start: 2021-06-26 | End: 2021-06-27 | Stop reason: HOSPADM

## 2021-06-26 RX ORDER — LABETALOL HYDROCHLORIDE 5 MG/ML
10-20 INJECTION, SOLUTION INTRAVENOUS
Status: DISCONTINUED | OUTPATIENT
Start: 2021-06-26 | End: 2021-06-26

## 2021-06-26 RX ADMIN — LEVETIRACETAM 500 MG: 500 TABLET, FILM COATED ORAL at 19:52

## 2021-06-26 RX ADMIN — DOCUSATE SODIUM 50 MG AND SENNOSIDES 8.6 MG 2 TABLET: 8.6; 5 TABLET, FILM COATED ORAL at 08:02

## 2021-06-26 RX ADMIN — CARVEDILOL 6.25 MG: 6.25 TABLET, FILM COATED ORAL at 17:26

## 2021-06-26 RX ADMIN — HEPARIN SODIUM 5000 UNITS: 5000 INJECTION, SOLUTION INTRAVENOUS; SUBCUTANEOUS at 13:22

## 2021-06-26 RX ADMIN — LEVOTHYROXINE SODIUM 88 MCG: 0.09 TABLET ORAL at 08:02

## 2021-06-26 RX ADMIN — NIFEDIPINE 90 MG: 90 TABLET, FILM COATED, EXTENDED RELEASE ORAL at 09:12

## 2021-06-26 RX ADMIN — PREGABALIN 100 MG: 100 CAPSULE ORAL at 08:02

## 2021-06-26 RX ADMIN — CYANOCOBALAMIN TAB 1000 MCG 1000 MCG: 1000 TAB at 08:02

## 2021-06-26 RX ADMIN — PREGABALIN 100 MG: 100 CAPSULE ORAL at 19:52

## 2021-06-26 RX ADMIN — DOCUSATE SODIUM 50 MG AND SENNOSIDES 8.6 MG 2 TABLET: 8.6; 5 TABLET, FILM COATED ORAL at 19:52

## 2021-06-26 RX ADMIN — POLYETHYLENE GLYCOL 3350 17 G: 17 POWDER, FOR SOLUTION ORAL at 08:02

## 2021-06-26 RX ADMIN — HEPARIN SODIUM 5000 UNITS: 5000 INJECTION, SOLUTION INTRAVENOUS; SUBCUTANEOUS at 21:35

## 2021-06-26 RX ADMIN — LEVETIRACETAM 500 MG: 500 TABLET, FILM COATED ORAL at 08:02

## 2021-06-26 RX ADMIN — DOXAZOSIN 1 MG: 1 TABLET ORAL at 08:02

## 2021-06-26 RX ADMIN — Medication 25 MCG: at 08:02

## 2021-06-26 RX ADMIN — DULOXETINE HYDROCHLORIDE 20 MG: 20 CAPSULE, DELAYED RELEASE ORAL at 08:02

## 2021-06-26 RX ADMIN — ATORVASTATIN CALCIUM 40 MG: 40 TABLET, FILM COATED ORAL at 08:02

## 2021-06-26 RX ADMIN — LORATADINE 10 MG: 10 TABLET ORAL at 08:02

## 2021-06-26 RX ADMIN — CARVEDILOL 6.25 MG: 6.25 TABLET, FILM COATED ORAL at 08:02

## 2021-06-26 RX ADMIN — HEPARIN SODIUM 5000 UNITS: 5000 INJECTION, SOLUTION INTRAVENOUS; SUBCUTANEOUS at 06:56

## 2021-06-26 ASSESSMENT — ACTIVITIES OF DAILY LIVING (ADL)
ADLS_ACUITY_SCORE: 25

## 2021-06-26 NOTE — PLAN OF CARE
Patient admitted on 6/21 with left intracerebral hemorrhagic stroke, VSS, Neuros: disoriented to time, at 2000 patient disoriented to place, BLE 4/5, PIV saline locked, DD2 with thins, up with A1 and gaitbelt, ambulated in hallway 1x this evening, stroke PLC Monday at 1030, patient voiding small amounts last PVR 26mL, discharge to TCU pending placement, continue to monitor and follow POC

## 2021-06-26 NOTE — PLAN OF CARE
Status: Admitted for L basel ganglia ICH. Hx DM II, HTN, HLD, CAD, CKD III, vascular dementia, seizures, previous CVA.  Vitals: Intially HTN. Back to parameters without PRN medications.   Neuros: Disoriented to Place this shift. Waxes and wanes. Slow speech. 4/5 strength throughout.   IV: PIV SL.  Labs/Electrolytes: K+ 5.2 redraw ordered with labs recomendation d/t suspected hemolyzing  Resp: RA. WNL.  Diet: DDL2. Thin Liq.   Bowel status: BM 6/25  : Unable to void at times with frequent attempts. Straight cath orders for 700mL   Skin: WNL.  Pain: Intially denied pain then acknowledged hip pain while standing. Declined pain interventions  Activity: Ax1 w/ GB & walker  Plan: Stroke PLC rescheduled to Monday 6/28 1030. SW following for discharge to TCU. Expected discharge for tomorrow 6/26.

## 2021-06-26 NOTE — PROGRESS NOTES
Care Management Discharge Note    Discharge Date: 6/27/2021 (Sunday)       Discharge Disposition: Transitional Care-[facility]   [address], [place]    Admissions: [facility] [phone] f: [phone]    Discharge Services: (Short term TCU placement)    Discharge DME:  None     Discharge Transportation: family or friend will provide    Private pay costs discussed: Not applicable    PAS Confirmation Code:  [id number]  Patient/family educated on Medicare website which has current facility and service quality ratings:      Education Provided on the Discharge Plan: Yes    Referrals Placed by CM/SW:  Post Acute Care Facility's  - [facility], no bed availability    - [facility] (Epic referral made), no bed availability over the weekend, will reassess Monday    - [facility] (Epic referral made), voicemail left for admissions    - [facility] (Epic referral made), spoke with Kathryn in admissions ([phone]) not certain they would have a bed this weekend.     Persons Notified of Discharge Plans: Pt's dtr, bedside RN and writer paged Neurology regarding discharge time and when SW needs orders tomorrow.    Patient/Family in Agreement with the Plan: yes    Handoff Referral Completed: No    Additional Information:    Weekend  followed up on referrals made yesterday. The only facility that had an opening tomorrow was [facility]. Discussed with dtr, Maria Guadalupe, and she is in agreement with her mother discharging to [facility].     [facility] is requesting orders be faxed to them at 10am to allow them to get pt's meds and then they can accept at 1300.           Annabella Mayes, [facility] Maine Medical CenterSW

## 2021-06-26 NOTE — PLAN OF CARE
"/67 (BP Location: Left arm)   Pulse 78   Temp 96.8  F (36  C) (Axillary)   Resp 16   Ht 1.651 m (5' 5\")   Wt 72.3 kg (159 lb 6.3 oz)   SpO2 94%   BMI 26.52 kg/m      Shift: [id number]  Reason for Admission: 6/21 for right sided weakness, right facial droop and slurred speech. CT at 16:30 showed left sided basal ganglia intracranial hemorrhage  VS: VSS on RA  Neuros: Disoriented to time, place, and situation for this shift. 4/5 motor strength for BUE/BLE. Moderate  strength. Otherwise neuros intact.   GI/: No BMs this shift, incontinent of urine and stool. 1 incontinent episode of urine. Bladder scanning q4h. Retention/Hesitancy with urine  Nutrition: DD2 + thin liquids. Needs 1:1 supervision for all feeds  Drains/Lines: PIV SL  Activity: A1 + GB. Up in chair for meals  Pain/Nausea: Denies both  Labs: BG ACHS  Social: Daughter currently at bedside  Plan of care: Discharge to TCU ([facility]) tomorrow at 1300. Will continue to monitor and follow POC  "

## 2021-06-26 NOTE — DISCHARGE SUMMARY
[facility]    Neurology Stroke Discharge Summary    Date of Admission: 6/21/2021  Date of Discharge: 06/27/2021    Disposition: Discharged to TCU  Primary Care Physician: Bony Castaneda      Admission Diagnosis:   Hypertensive left basal ganglia intracerebral hemorrhage      Discharge Diagnosis:   Intracerebral Hemorrhage   Hypertension  CAD s/p PCI  Hyperlipidemia  DM type 2  Hypothyroidism  Mild vasogenic cerebral edema not requiring intervention    Problem Leading to Hospitalization (from HPI):   Hypertension  Hyperlipidemia  Prior ischemic stroke  CAD  Stage 3 CKD  Hypothyroidism  DM type 2    Please see H&P dated 6/21/2021 for further details about presentation.    Brief Hospital Course:   Ms. Matthews is a 60 year old female with past medical history as above who presented with right facial droop, slurred speech, and right hemiparesis.  A stroke code was called and imaging revealed a left basal ganglia ICH.  She was admitted to the ICU for monitoring and was stable so eventually transferred to the floor.  She was started on additional blood pressure medications in order to control her blood pressure and her home aspirin was held in the setting of the bleed.  PT and OT evaluated her and recommended TCU for additional therapy prior to going home.  Speech cleared the patient for an oral diet.  She was discharged in stable condition to a TCU.    Work-up as stated below under Pertinent Investigations.    Etiology is thought to be hypertension.      Rehab evaluation: OT, PT and SLP.     Smoking Cessation: education provided    BP Long-term Goal: 140/90 or less    Antithrombotic/Anticoagulant Agent: not ordered due to contraindication ICH, can resume as an outpatient if repeat CT imaging is stable on 7/20/21    Statins:  continued on home atorvastatin 40        Hgb A1C Goal: < 7.0    Complications: None.     Other problems addressed during this hospitalization:  The  patient had some transient urinary retention and had to be straight cathed on a few occasions.  However increasing the indication of straight catheterization to over 500cc, adding doxazosin and an improvement of the patient's mental status caused a resolution of urinary retention. She had an episode of hypoglycemia on 6/27 AM after resuming lantus dose to PTA dose, which was then reduced to 20 Units.     PERTINENT INVESTIGATIONS    Labs  Lipid Panel: No results for input(s): CHOL, HDL, LDL, TRIG, CHOLHDLRATIO in the last 62509 hours.  A1C:   Lab Results   Component Value Date    A1C 7.8 06/21/2021     INR:   Recent Labs   Lab 06/21/21  1618   INR 0.91      Coag Panel / Hypercoag Workup: Not indicated  Pending test results: repeat CT Head on 7/20/21    Echo: none  Imaging: CT head showed left BG ICH, MRI confirmed and showed small microbleeds as well in areas typical for hypertensive hemorrhage  Endovascular procedure: None     Cardiac Monitoring: Patient had > 24 hrs of cardiac monitor while in hospital.    Findings: No atrial fibrillation was found.    Sleep Apnea Screen:   Questions/Answers      1. Prior to your stroke, have you been told that you snore? No.    2. Prior to your stroke, have you been told that you struggle to breath while you are sleeping? No.    3. Prior to your stroke, do you feel tired and sleepy even after getting a normal night of sleep? Yes.    Sleep Apnea Screen Findings: Patient has 0-1 symptoms of sleep apnea.  Further sleep study is not recommended at this time.    PHQ-9 Depression Screen Score: Unable to assess due to Poor patient participation with only yes/no answers, overall unreliable assessment at this time    Education discussed with: patient and child on blood pressure management, cholesterol management, medical management, diet modification, follow-up recommendations/plan, 911 call if warning signs of stroke and results (Neuroimaging findings, etiopathogenesis of her stroke and  expected recovery).    During daily rounds, the plan of care was discussed and developed with patient and child.  Plan of care includes: As above .    PHYSICAL EXAMINATION  Vital Signs:  B/P: 166/71, T: 97.9, P: 84, R: 16    General:  patient lying in bed without any acute distress     HEENT:  normocephalic/atraumatic  Cardio:  RRR  Pulmonary:  no respiratory distress  Abdomen:  soft, non-tender, non-distended  Extremities:  no edema  Skin:  intact, warm/dry     Neurologic  General: Elderly  woman lying in bed in no acute distress. Mood is good, affect appropriate.  She is awake, alert and oriented to person, place, time and situation.     CN:  PERRLA and brisk. EOMI.  Suspected mild right incongruent hemianopia.  Mild right nasolabial flattening. Facial sensation is intact to touch in all 3 divisions bilaterally. Palate elevates symmetrically. Phonation is normal, although the patient has a reduced fluency [baseline has poor verbal output, per the patient's daughter]. Head turning and shoulder shrug are intact. Tongue is midline with normal movements.    Motor:   5/5 strength in LUE, LLE, and RLE. There is mild drift in the RUE that continues to improve, strength is 5-/5.    Sensory:  intact to light touch in 4/4 extremities, no extinction. Decreased proprioception in BL big toes, decreased vibratory sense as well. Intact at ankle.    Reflexes: 2+ and symmetric, no castillo, toes down going.    Cerebellar:  No ataxia on FNT, HST.     Gait:   Normal stance, normal arm swing, 2 step turn      National Institutes of Health Stroke Scale (on day of discharge)  NIHSS Total Score: 2 [right nasolabial flattening, RUE drift]  Modified Warren Scale (on day of discharge): 3-Moderate disability; requiring some help, but able to walk without assistance    Stroke Scales on admission  ICH score 1 (for GCS 5-12), NIHSS 15.    Medications    Current Discharge Medication List        START taking these medications    Details    doxazosin (CARDURA) 1 MG tablet Take 1 tablet (1 mg) by mouth daily  Qty: 60 tablet, Refills: 1    Associated Diagnoses: Left-sided nontraumatic intracerebral hemorrhage, unspecified cerebral location (H); Benign essential hypertension           CONTINUE these medications which have CHANGED    Details   carvedilol (COREG) 6.25 MG tablet 1 tablet (6.25 mg) by Oral or Feeding Tube route 2 times daily (with meals)  Qty: 60 tablet, Refills: 1    Associated Diagnoses: Left-sided nontraumatic intracerebral hemorrhage, unspecified cerebral location (H); Benign essential hypertension      insulin glargine (LANTUS PEN) 100 UNIT/ML pen Inject 20 Units Subcutaneous At Bedtime  Qty: 30 mL, Refills: 11    Comments: If Lantus is not covered by insurance, may substitute Basaglar at same dose and frequency.    Associated Diagnoses: History of insulin dependent diabetes mellitus      NIFEdipine ER OSMOTIC (ADALAT CC) 90 MG 24 hr tablet Take 1 tablet (90 mg) by mouth daily  Qty: 60 tablet, Refills: 1    Associated Diagnoses: Left-sided nontraumatic intracerebral hemorrhage, unspecified cerebral location (H); Benign essential hypertension           CONTINUE these medications which have NOT CHANGED    Details   atorvastatin (LIPITOR) 40 MG tablet Take 1 tablet (40 mg) by mouth daily  Qty: 90 tablet, Refills: 3    Associated Diagnoses: Hyperlipidemia LDL goal <100      blood glucose (NO BRAND SPECIFIED) test strip Use to test blood sugar 4-5 times daily or as directed.  Qty: 400 each, Refills: 6    Associated Diagnoses: Type 1 diabetes mellitus with other circulatory complication (H)      clotrimazole (LOTRIMIN) 1 % external cream Apply topically 2 times daily Until rash resolved  Qty: 60 g, Refills: 3    Associated Diagnoses: Fungal dermatitis      cyanocobalamin (VITAMIN B-12) 1000 MCG tablet Take 1 tablet (1,000 mcg) by mouth daily  Qty: 100 tablet, Refills: 3    Associated Diagnoses: Vitamin B12 deficiency (non anemic)       diclofenac (VOLTAREN) 1 % topical gel Apply 2 g topically 4 times daily as needed for moderate pain  Qty: 150 g, Refills: 1    Associated Diagnoses: Pain in joint, ankle and foot, right      DULoxetine (CYMBALTA) 20 MG capsule Take 1 capsule (20 mg) by mouth daily  Qty: 90 capsule, Refills: 3    Associated Diagnoses: Fibromyalgia      insulin aspart (NOVOLOG PEN) 100 UNIT/ML pen Use with meals and at bedtime per sliding scale and carb correction 1 unit for 10 g carbs,  Route: Inject 0-10 Units Subcutaneous 4 times daily (with meals and nightly) Use with meals and at bedtime per sliding scale and carb correction 1 unit for 10 g carbs.  Use 24-48 units per day.  Qty: 45 mL, Refills: 0    Associated Diagnoses: History of insulin dependent diabetes mellitus; Diabetic ketoacidosis without coma associated with type 2 diabetes mellitus (H)      insulin pen needle (31G X 8 MM) 31G X 8 MM miscellaneous Use 4 pen needles daily or as directed.  Qty: 300 each, Refills: 4    Associated Diagnoses: History of insulin dependent diabetes mellitus      levETIRAcetam (KEPPRA) 500 MG tablet Take 1 tablet (500 mg) by mouth 2 times daily  Qty: 180 tablet, Refills: 3    Associated Diagnoses: Seizures (H)      loratadine (CLEAR-ATADINE) 10 MG tablet Take 10 mg by mouth daily      Melatonin 10 MG TABS tablet Take 10 mg by mouth At Bedtime      pregabalin (LYRICA) 100 MG capsule Take 1 capsule (100 mg) by mouth 2 times daily For additional refills, please schedule a follow-up appointment at [phone]  Qty: 60 capsule, Refills: 0    Associated Diagnoses: Other osteoarthritis of spine, unspecified spinal region; Neuropathy      SYNTHROID 88 MCG tablet Take 1 tablet (88 mcg) by mouth daily  Qty: 90 tablet, Refills: 3    Associated Diagnoses: Acquired hypothyroidism      terbinafine (LAMISIL) 1 % external cream Apply topically to gume of rash twice daily if needed  Qty: 42 g, Refills: 1    Associated Diagnoses: Intertrigo labialis       Vitamin D3 (CHOLECALCIFEROL) 25 mcg (1000 units) tablet Take 1 tablet (25 mcg) by mouth daily  Qty: 100 tablet, Refills: 3    Associated Diagnoses: Benign essential hypertension           STOP taking these medications       aspirin (ASA) 81 MG EC tablet Comments:   Reason for Stopping:         hydrochlorothiazide (HYDRODIURIL) 25 MG tablet Comments:   Reason for Stopping:               Additional recommendations and follow up:       CT Head w/o contrast*     Neurology Adult Referral      General info for SNF    Length of Stay Estimate: Variable. Depending recovery   Condition at Discharge: Improving  Level of care:skilled   Rehabilitation Potential: Fair  Admission H&P remains valid and up-to-date: Yes  Recent Chemotherapy: N/A  Use Nursing Home Standing Orders: Yes (Head CT on 7/20/21 and resumption of aspirin 81mg thereafter if no evidence of worsening intracranial bleeding)     Reason for your hospital stay    Intracranial bleeding (stroke)     Activity - Up with nursing assistance     Follow Up and recommended labs and tests    Follow up with primary care provider, Bony Castaneda, within 7 days to evaluate medication change, to evaluate treatment change and for hospital follow- up.  The following labs/tests are recommended: Head CT 7/20/21    Follow up in Neurology clinic in 4-6 weeks after hospital discharge. Follow up of head CT, which if stable, okay to resume ASA 81mg QD     Full Code     Fall precautions     Diet    Follow this diet upon discharge: Orders Placed This Encounter      Dysphagia Diet Level 2 [facility] Altered Thin Liquids (water, ice chips, juice, milk gelatin, ice cream, etc)       Patient was seen and discussed with the Attending, Dr. Mauro.  Stroke Fellow

## 2021-06-26 NOTE — PLAN OF CARE
"Status: Pt on 6a s/p ICH.   Vitals: /67 (BP Location: Left arm)   Pulse 79   Temp 97.9  F (36.6  C) (Axillary)   Resp 16   Ht 1.651 m (5' 5\")   Wt 72.3 kg (159 lb 6.3 oz)   SpO2 99%   BMI 26.52 kg/m   Did not need any PRN BP meds this shift.   Neuros: Oriented x3, waxes and wanes. Confused and forgetful. BLE 4/5.   IV: PIV SL.   Resp/trach: On RA.   Diet: DD2/thins, takes pills whole one at a time.   Bowel status: No BM this shift.   : Bladder scanned for 517 at 1455. Incontinent x1.   Pain: Denies.   Activity: Up w/ 1 & GB.   Plan: Transfer to TCU once placement secured.       "

## 2021-06-26 NOTE — PROGRESS NOTES
[facility]    Stroke Progress Note    Isabell was admitted on 6/21 for right sided weakness, right facial droop and slurred speech. CT at 16:30 showed left sided basal ganglia intracranial hemorrhage, repeat CT at 22:30 showed a stable lesion, follow up imaging on 6/22 and 6/23 also show no changes.    Interval Events  No acute events overnight.  Vitals stable.  Did not need any significant as needed antihypertensive medications overnight.  She was also not straight cathed and was able to void on her own this morning.  Mental status and neuro exam remains stable Fully participates in exam. Pleasant affect.     Impression  # Hypertensive left basal ganglia IPH without IVH      Plan  Neuro  # Hypertensive left basal ganglia IPH without IVH; ICH score 1 on admission  # Deep cerebral microbleeds  - Repeat CT stable on 6/21, 6/22 and 6/23   - MRI shows stable intraparenchymal hemorrhage (6/22)  - Neuro checks q4 hours  - Blood pressure goal: Normotension, give IV antihypertensive medications if SBP is greater than 160mmHg  - PT/OT   - Hold Aspirin till 7/21/2021   -Repeat Head CT on 7/20/2021 to access stability of bleed before restarting ASA  - F/u in neuro stroke clinic    #Hx of Seizures  - Continue PTA Keppra 500mg BID    #Peripheral neuropathy  - Continue PTA Duloxetine 20mg daily  - Continue PTA Pregabalin 100mg BID    CV  #HTN  #HLD  #CAD s/p PCI (2004)  - Blood pressure goal: Normotension, give IV antihypertensive medications if SBP is greater than 160mmHg  - Continue PTA atorvastatin 40 mg daily  - PTA antihypertensive medications:   Coreg 3.125 mg twice daily   Hydrochlorothiazide 25 mg daily    - Current Anti-hypertensives:   Coreg 6.25 mg twice daily (increased 6/25/2021)   Nifedipine ER 90 mg daily (started 6/21/2021)   Doxazosin 1 mg daily (added given concomitant urinary retention)     - Hold PTA aspirin in the setting of acute ICH as detailed  above    Pulm  DAMIAN    GI  - SLP Eval 6/25: NDD2     Renal   #CKD III (Cr 1.49)  DAMIAN    # Neurogenic Bladder  - Resolving, continue with Doxazosin 1mg daily  - UA from 6/23 and 6/24 with some protein but no nitrites, leukocyte esterase, or  WBCs      Endo  #Insulin dependent diabetes mellitus  - A1C: 7.8  - Continue high insulin resistance dosing sliding scale correction insulin (Aspart)  - Continue with PTA Glargine to 26 units at betime.     #Hypothyroidism  -Continue PTA levothyroxine 88mcg daily     ID  DAMIAN    Heme  DAMIAN     FEN: NDD2   Ppx: DVT - SQH  Code: Full.  Dispo: RIMA MARROQUIN MD  PGY5  ______________________________________________________    Medications   Home Meds  Prior to Admission medications    Medication Sig Start Date End Date Taking? Authorizing Provider   aspirin (ASA) 81 MG EC tablet Take 1 tablet (81 mg) by mouth daily 12/13/20   Bony Castaneda MD   atorvastatin (LIPITOR) 40 MG tablet Take 1 tablet (40 mg) by mouth daily 9/2/20   Bony Castaneda MD   blood glucose (NO BRAND SPECIFIED) test strip Use to test blood sugar 4-5 times daily or as directed. 11/2/20   Bony Castaneda MD   carvedilol (COREG) 3.125 MG tablet Take 1 tablet (3.125 mg) by mouth 2 times daily (with meals) 9/2/20   Bony Castaneda MD   clotrimazole (LOTRIMIN) 1 % external cream Apply topically 2 times daily Until rash resolved 11/2/20   Bony Castaneda MD   cyanocobalamin (VITAMIN B-12) 1000 MCG tablet Take 1 tablet (1,000 mcg) by mouth daily 9/2/20   Bony Castaneda MD   diclofenac (VOLTAREN) 1 % topical gel Apply 2 g topically 4 times daily as needed for moderate pain 2/25/21   Bony Castaneda MD   DULoxetine (CYMBALTA) 20 MG capsule Take 1 capsule (20 mg) by mouth daily 11/2/20   Bony Castaneda MD   hydrochlorothiazide (HYDRODIURIL) 25 MG tablet Taper to one half for one week then off if BP less than 140/90 12/16/20   Bony Castaneda MD   insulin aspart (NOVOLOG  PEN) 100 UNIT/ML pen Use with meals and at bedtime per sliding scale and carb correction 1 unit for 10 g carbs,  Route: Inject 0-10 Units Subcutaneous 4 times daily (with meals and nightly) Use with meals and at bedtime per sliding scale and carb correction 1 unit for 10 g carbs.  Use 24-48 units per day. 12/7/20   Bony Castaneda MD   insulin glargine (LANTUS PEN) 100 UNIT/ML pen Inject 26 Units Subcutaneous At Bedtime 10/12/20   Shira Luna PA-C   insulin pen needle (31G X 8 MM) 31G X 8 MM miscellaneous Use 4 pen needles daily or as directed. 9/2/20   Bony Castaneda MD   levETIRAcetam (KEPPRA) 500 MG tablet Take 1 tablet (500 mg) by mouth 2 times daily 9/2/20   Bony Castaneda MD   loratadine (CLEAR-ATADINE) 10 MG tablet Take 10 mg by mouth daily    Reported, Patient   Melatonin 10 MG TABS tablet Take 10 mg by mouth At Bedtime    Unknown, Entered By History   NIFEdipine ER (ADALAT CC) 60 MG 24 hr tablet Take 1 tablet (60 mg) by mouth daily 9/2/20   Bony Castaneda MD   pregabalin (LYRICA) 100 MG capsule Take 1 capsule (100 mg) by mouth 2 times daily For additional refills, please schedule a follow-up appointment at [phone] 5/25/21   Bony Castaneda MD   SYNTHROID 88 MCG tablet Take 1 tablet (88 mcg) by mouth daily 9/2/20   Bony Castaneda MD   terbinafine (LAMISIL) 1 % external cream Apply topically to ugme of rash twice daily if needed 2/25/21   Bony Castaneda MD   Vitamin D3 (CHOLECALCIFEROL) 25 mcg (1000 units) tablet Take 1 tablet (25 mcg) by mouth daily 9/2/20   Bony Castaneda MD       Scheduled Meds    atorvastatin  40 mg Oral or Feeding Tube Daily     carvedilol  6.25 mg Oral or Feeding Tube BID w/meals     cyanocobalamin  1,000 mcg Oral or Feeding Tube Daily     doxazosin  1 mg Oral Daily     DULoxetine  20 mg Oral or Feeding Tube Daily     heparin ANTICOAGULANT  5,000 Units Subcutaneous Q8H     insulin aspart  1-10 Units Subcutaneous TID AC      insulin aspart  1-7 Units Subcutaneous At Bedtime     insulin glargine  26 Units Subcutaneous At Bedtime     levETIRAcetam  500 mg Oral or Feeding Tube BID     levothyroxine  88 mcg Oral or Feeding Tube Daily     loratadine  10 mg Oral or Feeding Tube Daily     NIFEdipine ER  90 mg Oral Daily     polyethylene glycol  17 g Oral or Feeding Tube Daily     pregabalin  100 mg Oral or Feeding Tube BID     senna-docusate  1-2 tablet Oral or Feeding Tube BID     Vitamin D3  25 mcg Oral or Feeding Tube Daily       PRN Meds  acetaminophen **OR** acetaminophen **OR** acetaminophen, glucose **OR** dextrose **OR** glucagon, magnesium citrate, ondansetron **OR** ondansetron       PHYSICAL EXAMINATION  Temp:  [97.4  F (36.3  C)-98.6  F (37  C)] 97.9  F (36.6  C)  Pulse:  [75-88] 84  Resp:  [14-16] 16  BP: (101-169)/(56-88) 166/71  SpO2:  [95 %-98 %] 98 %     General:  patient lying in bed without any acute distress, increasingly somnolent   HEENT:  normocephalic/atraumatic  Cardio:  RRR  Pulmonary:  no respiratory distress  Abdomen:  soft, non-tender  Extremities:  no edema  Skin:  warm/dry      Neuro:  MS: Awake, alert, oriented to person, place. Aware of year intermittently (but this is not new). She follows simple commands and can repeat phrases. Naming is intact.  She has a reduced global output/fluency, which per patient is at baseline affect.  CN: PEERL, EOMI, right lower facial droop improving. Slight asymmetry with smile on right. Palate elevates symmetrically, uvula midline, can close both eyes and puff out cheeks. Hearing is intact to conversation. Facial sensation intact.  Motor: 5/5 strength in LUE, LLE, and RLE. There is mild drift in the RUE that continues to improve, strength is 5-/5.  Sensory: intact to light touch in 4/4 extremities, no extinction. Decreased proprioception in BL big toes, decreased vibratory sense as well. Intact at ankle.  Reflexes: 2+ and symmetric, no castillo, toes down going.  Gait: deferred      Stroke Scales  ICH score 1 on admit (for GCS 5-12), NIHSS score of 15. Scores today are 0 and 2    National Institutes of Health Stroke Scale  Exam Interval: 4 days   Score    Level of consciousness: (0)   Alert, keenly responsive    LOC questions: (0)   Answers both questions correctly    LOC commands: (0)   Performs both tasks correctly    Best gaze: (0)   Normal    Visual: (0)   No visual loss    Facial palsy: (1)   Minor paralysis (flat nasolabial fold, smile asymmetry)    Motor arm (left): (0) no drift    Motor arm (right): (1)   Drift    Motor leg (left): (0)   No drift    Motor leg (right): (0)   No drift    Limb ataxia: (0)   Absent    Sensory: (0)   Normal- no sensory loss    Best language: (0)   Normal- no aphasia    Dysarthria: (0)   Normal    Extinction and inattention: (0)   No abnormality        Total Score:  2      ON addmision: 15          ICH Score Tool Patients Score   Age ? 80 years 1 point    GCS score  3-4  5-12   13-15    2 point  1 point  0 point 0   Hematoma volume, cm3 ? 30 1 point 0   Intraventricular extension 1 point 0   Infratentorial location 1 point 0   Patient s ICH Score (0-6) = 0           On admission: 1      Imaging  I personally reviewed all imaging; relevant findings per HPI.     MRI on 6/21  CT on 6/21, 6/22, 6/23    Lab Results Data   CBC  Recent Labs   Lab 06/26/21  0634 06/25/21  0610 06/24/21  0718   WBC 5.8 5.9 6.3   RBC 4.32 4.18 4.54   HGB 11.8 11.5* 12.4   HCT 37.6 36.0 39.6    241 246     Basic Metabolic Panel    Recent Labs   Lab 06/26/21  1059 06/26/21  0634 06/25/21  2249 06/25/21  0610 06/24/21  0718 06/24/21  0718   NA  --  140  --  141  --  141   POTASSIUM 4.0 4.0 5.2 3.2*   < > 3.0*   CHLORIDE  --  108  --  109  --  109   CO2  --  25  --  27  --  27   BUN  --  21  --  23  --  22   CR  --  1.62*  --  1.52*  --  1.62*   GLC  --  172*  --  70  --  120*   VERONICA  --  9.4  --  8.8  --  9.1    < > = values in this interval not displayed.     Liver Panel  No  results for input(s): PROTTOTAL, ALBUMIN, BILITOTAL, ALKPHOS, AST, ALT, BILIDIRECT in the last 168 hours.  INR    Recent Labs   Lab Test 06/21/21  1618 09/27/20  1601   INR 0.91 0.99      Lipid Profile  No lab results found.  A1C    Recent Labs   Lab Test 06/21/21 2011 09/27/20  1601   A1C 7.8* 11.7*     Troponin I    Recent Labs   Lab 06/21/21  1618   TROPI <0.015

## 2021-06-27 VITALS
DIASTOLIC BLOOD PRESSURE: 78 MMHG | HEART RATE: 77 BPM | TEMPERATURE: 97.1 F | RESPIRATION RATE: 16 BRPM | BODY MASS INDEX: 26.56 KG/M2 | HEIGHT: 65 IN | OXYGEN SATURATION: 98 % | SYSTOLIC BLOOD PRESSURE: 160 MMHG | WEIGHT: 159.39 LBS

## 2021-06-27 LAB
ANION GAP SERPL CALCULATED.3IONS-SCNC: 4 MMOL/L (ref 3–14)
BUN SERPL-MCNC: 24 MG/DL (ref 7–30)
CALCIUM SERPL-MCNC: 8.9 MG/DL (ref 8.5–10.1)
CHLORIDE SERPL-SCNC: 112 MMOL/L (ref 94–109)
CO2 SERPL-SCNC: 28 MMOL/L (ref 20–32)
CREAT SERPL-MCNC: 1.48 MG/DL (ref 0.52–1.04)
ERYTHROCYTE [DISTWIDTH] IN BLOOD BY AUTOMATED COUNT: 15.3 % (ref 10–15)
GFR SERPL CREATININE-BSD FRML MDRD: 37 ML/MIN/{1.73_M2}
GLUCOSE BLDC GLUCOMTR-MCNC: 158 MG/DL (ref 70–99)
GLUCOSE BLDC GLUCOMTR-MCNC: 182 MG/DL (ref 70–99)
GLUCOSE BLDC GLUCOMTR-MCNC: 244 MG/DL (ref 70–99)
GLUCOSE BLDC GLUCOMTR-MCNC: 30 MG/DL (ref 70–99)
GLUCOSE SERPL-MCNC: 49 MG/DL (ref 70–99)
HCT VFR BLD AUTO: 37.9 % (ref 35–47)
HGB BLD-MCNC: 12 G/DL (ref 11.7–15.7)
MCH RBC QN AUTO: 27.5 PG (ref 26.5–33)
MCHC RBC AUTO-ENTMCNC: 31.7 G/DL (ref 31.5–36.5)
MCV RBC AUTO: 87 FL (ref 78–100)
PHOSPHATE SERPL-MCNC: 3.9 MG/DL (ref 2.5–4.5)
PLATELET # BLD AUTO: 230 10E9/L (ref 150–450)
POTASSIUM SERPL-SCNC: 3.5 MMOL/L (ref 3.4–5.3)
RBC # BLD AUTO: 4.37 10E12/L (ref 3.8–5.2)
SODIUM SERPL-SCNC: 144 MMOL/L (ref 133–144)
WBC # BLD AUTO: 5.3 10E9/L (ref 4–11)

## 2021-06-27 PROCEDURE — 258N000001 HC RX 258: Performed by: STUDENT IN AN ORGANIZED HEALTH CARE EDUCATION/TRAINING PROGRAM

## 2021-06-27 PROCEDURE — 999N000127 HC STATISTIC PERIPHERAL IV START W US GUIDANCE

## 2021-06-27 PROCEDURE — 999N001017 HC STATISTIC GLUCOSE BY METER IP

## 2021-06-27 PROCEDURE — 250N000011 HC RX IP 250 OP 636: Performed by: STUDENT IN AN ORGANIZED HEALTH CARE EDUCATION/TRAINING PROGRAM

## 2021-06-27 PROCEDURE — 99239 HOSP IP/OBS DSCHRG MGMT >30: CPT | Mod: GC | Performed by: PSYCHIATRY & NEUROLOGY

## 2021-06-27 PROCEDURE — 80048 BASIC METABOLIC PNL TOTAL CA: CPT | Performed by: PSYCHIATRY & NEUROLOGY

## 2021-06-27 PROCEDURE — 250N000013 HC RX MED GY IP 250 OP 250 PS 637: Performed by: STUDENT IN AN ORGANIZED HEALTH CARE EDUCATION/TRAINING PROGRAM

## 2021-06-27 PROCEDURE — 84100 ASSAY OF PHOSPHORUS: CPT | Performed by: PSYCHIATRY & NEUROLOGY

## 2021-06-27 PROCEDURE — 250N000013 HC RX MED GY IP 250 OP 250 PS 637: Performed by: PSYCHIATRY & NEUROLOGY

## 2021-06-27 PROCEDURE — 36415 COLL VENOUS BLD VENIPUNCTURE: CPT | Performed by: PSYCHIATRY & NEUROLOGY

## 2021-06-27 PROCEDURE — 85027 COMPLETE CBC AUTOMATED: CPT | Performed by: PSYCHIATRY & NEUROLOGY

## 2021-06-27 RX ORDER — PREGABALIN 100 MG/1
100 CAPSULE ORAL 2 TIMES DAILY
Qty: 60 CAPSULE | Refills: 0 | Status: SHIPPED | OUTPATIENT
Start: 2021-06-27 | End: 2021-07-26

## 2021-06-27 RX ORDER — DOXAZOSIN 1 MG/1
1 TABLET ORAL DAILY
Qty: 60 TABLET | Refills: 1 | Status: SHIPPED | OUTPATIENT
Start: 2021-06-27 | End: 2021-07-26

## 2021-06-27 RX ORDER — NIFEDIPINE 90 MG/1
90 TABLET, FILM COATED, EXTENDED RELEASE ORAL DAILY
Qty: 60 TABLET | Refills: 1 | Status: SHIPPED | OUTPATIENT
Start: 2021-06-27 | End: 2021-07-18

## 2021-06-27 RX ORDER — CARVEDILOL 6.25 MG/1
6.25 TABLET ORAL 2 TIMES DAILY WITH MEALS
Qty: 60 TABLET | Refills: 1 | Status: SHIPPED | OUTPATIENT
Start: 2021-06-27 | End: 2021-07-27

## 2021-06-27 RX ADMIN — POLYETHYLENE GLYCOL 3350 17 G: 17 POWDER, FOR SOLUTION ORAL at 09:42

## 2021-06-27 RX ADMIN — Medication 25 MCG: at 09:41

## 2021-06-27 RX ADMIN — LORATADINE 10 MG: 10 TABLET ORAL at 09:40

## 2021-06-27 RX ADMIN — DOCUSATE SODIUM 50 MG AND SENNOSIDES 8.6 MG 2 TABLET: 8.6; 5 TABLET, FILM COATED ORAL at 09:50

## 2021-06-27 RX ADMIN — HEPARIN SODIUM 5000 UNITS: 5000 INJECTION, SOLUTION INTRAVENOUS; SUBCUTANEOUS at 05:38

## 2021-06-27 RX ADMIN — LEVOTHYROXINE SODIUM 88 MCG: 0.09 TABLET ORAL at 09:39

## 2021-06-27 RX ADMIN — LEVETIRACETAM 500 MG: 500 TABLET, FILM COATED ORAL at 09:40

## 2021-06-27 RX ADMIN — CYANOCOBALAMIN TAB 1000 MCG 1000 MCG: 1000 TAB at 09:49

## 2021-06-27 RX ADMIN — NIFEDIPINE 90 MG: 90 TABLET, FILM COATED, EXTENDED RELEASE ORAL at 09:41

## 2021-06-27 RX ADMIN — DOXAZOSIN 1 MG: 1 TABLET ORAL at 09:40

## 2021-06-27 RX ADMIN — PREGABALIN 100 MG: 100 CAPSULE ORAL at 09:42

## 2021-06-27 RX ADMIN — ATORVASTATIN CALCIUM 40 MG: 40 TABLET, FILM COATED ORAL at 09:40

## 2021-06-27 RX ADMIN — DULOXETINE HYDROCHLORIDE 20 MG: 20 CAPSULE, DELAYED RELEASE ORAL at 09:41

## 2021-06-27 RX ADMIN — CARVEDILOL 6.25 MG: 6.25 TABLET, FILM COATED ORAL at 09:40

## 2021-06-27 RX ADMIN — DEXTROSE MONOHYDRATE 25 ML: 500 INJECTION PARENTERAL at 08:46

## 2021-06-27 ASSESSMENT — ACTIVITIES OF DAILY LIVING (ADL)
ADLS_ACUITY_SCORE: 26
ADLS_ACUITY_SCORE: 25

## 2021-06-27 NOTE — CONSULTS
Pt discharging today. Bedside RN will teach stroke education due to PLC having no openings until tomorrow.

## 2021-06-27 NOTE — PROGRESS NOTES
Care Management Discharge Note    Discharge Date: 06/26/21       Discharge Disposition: Transitional Care - [facility]   [address], [place]    Admissions: Ale [phone] f: [phone]    Discharge Services: (Short term TCU placement)    Discharge DME:  None    Discharge Transportation: daughter Maria Guadalupe to provide transport at 1pm, 6/27    Private pay costs discussed: Not applicable    PAS Confirmation Code:  [id number]  Patient/family educated on Medicare website which has current facility and service quality ratings:  Not for this encounter.     Education Provided on the Discharge Plan:  yes  Persons Notified of Discharge Plans: Charge RN, Pt dtr, , [facility] Admissions Ale  Patient/Family in Agreement with the Plan: yes    Handoff Referral Completed: Yes    Additional Information:  SW continuing to follow for discharge planning.  Received call from Ale in Admisisons w [facility] ([phone]) who confirmed discharge for today.  Ale requested update on discharge orders to state sliding scale for insulin instead of carb counting.  Dr. Blankenship, Neurology Fellow, paged and discussed this request, agreed to update orders.  SW faxed completed orders to Ale (f: [phone]).  Confirmed transportation with daughter Maria Guadlaupe who still plans to pick Isabell up at 1pm.       Pt updated at bedside as well as bedside RN and 6A Charge RN.     KINZA received call from Admissions Ale who requested additional discharge documentation to be faxed (Lyrica rx, PT/OT therapy orders).  KINZA paged Neurology fellow who agreed to accommodate Ale's request.     Final updated orders re-faxed to Ale, bedside RN notified pt and daughter Maria Guadalupe.     SW available to support as needs arise.     Nohemy Figueroa, MSW, LICSW  Weekend Adult Acute Care     Searchble on [facility] - search SOCIAL WORK - for text paging options    4A, 4C, 4E, 5A and 5B; pager [phone]  6A, 6B,  6C and 6D; pager [phone]  7A, 7B, 7C, 7D and 5C; pager [phone]  [facility] pager: [phone]     RNCC/Care Coordinator; job code [id number] or [phone]    -For hours 1600 - midnight Pager: [phone]      Laura Figueroa, St. Mary's Regional Medical CenterSW

## 2021-06-27 NOTE — PLAN OF CARE
Status: Admitted for L basel ganglia ICH. Hx DM II, HTN, HLD, CAD, CKD III, vascular dementia, seizures, previous CVA.  Vitals: HTN but within parameters, OVSS  Neuros: Disoriented situation this shift, waxes and wanes. Intermittent confusion. Slow speech. 4/5 strength throughout.   IV: PIV SL  Labs/Electrolytes: Scheduled this AM  Resp: WNL  Diet: DD2 with thin Liquids   Bowel status: Last BM 6/25  : Urinary retention with intermittent incontinence, Orders to straight cath if rentention  >700mL, Bladder scanned for PVR of 320mL at 0640  Skin: WNL  Pain: denies  Activity: A1, GB, & walker  Plan: Per SW notes, anticipate discharge to TCU ([facility]) today at 1300. Shower before discharge per family. Stroke PLC scheduled for 06/28, made charge RN aware of this.

## 2021-06-27 NOTE — PLAN OF CARE
Pt was given packet for discharge and instructed to give to receiving TCU nurse. Packet included all the discharge orders and information along with hard script for Lyrica. That medication Rx was also faxed by writer per SW request. Pt was delayed to leave d/t getting orders corrected, getting Rx (controlled substance) with correct DURAN as well as pt's family requesting to order meal prior to leaving. Meals were offered but pt declined until family came to pick-up. RN called [facility] and left message for Ale (admissions coord) and asked that if there are more Qs she can call 6A.

## 2021-06-27 NOTE — PROVIDER NOTIFICATION
Nursing assistant collect BG and reading was 29, 2nd BG was checked and it was 30. Neuro assessment attempted but pt lethargic and unable to maintain conversation for longer than 5 seconds. Dextrose 50 25ml was given via PIV, BG check performed and increased to 180s. Pt becoming more alert and awake, MD @ bedside to lay eyes.

## 2021-06-27 NOTE — PLAN OF CARE
"BP (!) 147/76 (BP Location: Left arm)   Pulse 89   Temp 97.7  F (36.5  C) (Oral)   Resp 16   Ht 1.651 m (5' 5\")   Wt 72.3 kg (159 lb 6.3 oz)   SpO2 97%   BMI 26.52 kg/m      Time: 3401-6136  R. of admission/Status:admitted on 06/21/21 for right sided weakness, right facial droop and slurred speech. left sided basal ganglia intracranial hemorrhage per CT imaging.   Neuro:  A&Ox4, forgetful, intermittent confusion. 4/5 BUE and BLE.   Activity: Assist of one person to bathroom using GB.   Pain: denied   Cardiac: SR with HR 80-90s, denied chest pain. On telemetry monitoring.   Respiratory: RA sating > 95%, LS clear, no respiratory distress noted.   GI/: no bm this. Continent/incontinent of bowel and bladder. Bladder scan q 4 hr for retention and st cath if >700 ml. Did bladder scan at around 2200 699 ml, encouraged to void, then 210 ml post void bladder scan. Brief in place for incontinent.   Diet: DD 2, thin liquid, sitting up all the way with oral intake. Needs 1:1 supervision for feeding   Skin: no skin deficit. Pale skin noted.   LDAs: PIV saline locked.   Labs/Imaging:  mg/dl at 2200, Lantus and Novolog correction given at 2200.   New change this shift: none   Plan: discharge to TCU ([facility]) tomorrow at 1300. Shower before discharge per family request.     "

## 2021-06-28 NOTE — PLAN OF CARE
Speech Language Therapy Discharge Summary    Reason for therapy discharge:    Discharged to transitional care facility.    Progress towards therapy goal(s). See goals on Care Plan in [facility] electronic health record for goal details.  Goals partially met.  Barriers to achieving goals:   discharge from facility.    Therapy recommendation(s):    Continued therapy is recommended.  Rationale/Recommendations:  See below.    SLP note from 6/26/2021:  Pt seen upright for dysphagia follow up while sitting upright in bed. Pt is currently on DD2/thin liquids, she was agreeable to PO trials to ensure diet tolerance. Pt given regular solids, soft solids, and thin liquid via straw. Pt had slow and effortful mastication and oral residuals with regular solids. Mastication was timely, complete, and functional for soft solids. Pt was able to clear all trials with single swallow with no overt s/s aspiration across textures trialed. DD2/thin liquid diet continues to be most appropriate diet level at this time. Note that pt had x1 instance of bolus holding with thin liquid requiring verbal cue to swallow.     Recommend dysphagia diet 2 and thin liquids with 1:1 supervision. Pt must be seated upright, take small bites/sips, eat/drink slowly, and be monitored for bolus holding. Crush pills in puree. Downgrade to nectar thick liquids if s/sx of aspiration occur with thin liquids Speech therapy will continue to follow.

## 2021-06-28 NOTE — PLAN OF CARE
Physical Therapy Discharge Summary    Reason for therapy discharge:    Discharged to transitional care facility.    Progress towards therapy goal(s). See goals on Care Plan in [facility] electronic health record for goal details.  Goals partially met.  Barriers to achieving goals:   discharge from facility.    Therapy recommendation(s):    Continued therapy is recommended.  Rationale/Recommendations:  TCU to maximize functional mobility.

## 2021-06-28 NOTE — PLAN OF CARE
Occupational Therapy Discharge Summary    Reason for therapy discharge:    Discharged to transitional care facility.    Progress towards therapy goal(s). See goals on Care Plan in [facility] electronic health record for goal details.  Goals partially met.  Barriers to achieving goals:   discharge from facility.    Therapy recommendation(s):    Continued therapy is recommended.  Rationale/Recommendations:  Patient would benefit from ongoing therapies to promote increased ADL independence and safety.

## 2021-07-02 ENCOUNTER — COMMUNICATION - HEALTHEAST (OUTPATIENT)
Dept: GERIATRICS | Facility: CLINIC | Age: 61
End: 2021-07-02

## 2021-07-02 RX ORDER — INSULIN GLARGINE 100 [IU]/ML
20 INJECTION, SOLUTION SUBCUTANEOUS EVERY MORNING
Status: SHIPPED | COMMUNITY
Start: 2021-07-02 | End: 2021-07-27

## 2021-07-04 ENCOUNTER — COMMUNICATION - HEALTHEAST (OUTPATIENT)
Dept: GERIATRICS | Facility: CLINIC | Age: 61
End: 2021-07-04

## 2021-07-04 NOTE — TELEPHONE ENCOUNTER
Telephone Encounter by Lali Christensen RN at 7/2/2021  1:00 PM     Author: Lali Christensen RN Service: -- Author Type: Registered Nurse    Filed: 7/2/2021  1:23 PM Encounter Date: 7/2/2021 Status: Signed    : Lali Christensen RN (Registered Nurse)       [facility] Nurse Triage Telephone Note      Provider: BRANDON Manzano  Facility: [facility]    Facility Type: TCU    Caller: Dasha  Call Back Number:  [phone]    Allergies: Bee pollen    Reason for call: 12:20 BG is still reading High. ( Pt had total Novolog 14u at breakfast. Recheck 11am was still high, given total Novolog 14u then.) Pt Afeb 98.2, RA sat 94% R:18, {P:102, /80, no wounds. Not on antibiotic.    Verbal Order/Direction given by Provider: Give another Novolog 4 unit(s) now @ 1230.  Recheck 30min prior to supper. Change Novolog sliding scale to 141-180=2u, 181-220=4u, 221-260=6u, 261-300=8u, 301-340=10u, 341-380=12u, 381-420=14u, 421-460=16u, 461-500=18u. dicontinue Lantus 32u HS. Start Lantus 20u two times a day tonight.    Provider giving order: BRANDON Manzano    Verbal order given to: Dasha Christensen RN

## 2021-07-04 NOTE — TELEPHONE ENCOUNTER
Telephone Encounter by Lali Christensen RN at 7/4/2021 10:09 AM     Author: Lali Christensen RN Service: -- Author Type: Registered Nurse    Filed: 7/4/2021 10:13 AM Encounter Date: 7/2/2021 Status: Signed    : Lali Christensen RN (Registered Nurse)       [facility] Nurse Triage Telephone Note      Provider: BRANDON Manzano  Facility: [facility]    Facility Type: TCU    Caller: Henny  Call Back Number:  [phone]    Allergies: Bee pollen    Reason for call: Dtr brought in acucheck machine from home & checked mom's BG, it read High. Nsg checked with their machine, reading High still.     Verbal Order/Direction given by Provider: Add Scheduled Novolog 6units with each meal, plus use sliding scale coverage. If still High at HS call the on call.    Provider giving order: BRANDON Manzano    Verbal order given to: Henny Christensen RN

## 2021-07-04 NOTE — TELEPHONE ENCOUNTER
"Telephone Encounter by Karrie Marcos RN at 7/2/2021  8:01 AM     Author: Karrie Marcos RN Service: -- Author Type: Registered Nurse    Filed: 7/2/2021 12:36 PM Encounter Date: 7/2/2021 Status: Addendum    : Karrie Marcos RN (Registered Nurse)    Related Notes: Original Note by Karrie Marcos RN (Registered Nurse) filed at 7/2/2021  8:04 AM       [facility] Nurse Triage Telephone Note      Provider: BRANDON Manzano  Facility: [facility]    Facility Type: TCU    Caller: Dasha  Call Back Number:  [phone]    Allergies: Bee pollen    Reason for call: Pt BS is registering \"HI\" this am,thept has not had anything to eat this am. Pt has s/s up to 400 and to give 10units of novolog and on Lantus 26units at HS.   The was noted to have been soaked in urine this am, no other s/s of infection. VS stable.     Update: Pt BS re-checked @ 11am and BS still reading Hi.     Verbal Order/Direction given by Provider: Give an extra 4units of novolog now, increase Lantus to 32units @ HS.     Update: give another 4units of novolog and check BS before lunch and give s/s as ordered.     Provider giving order: BRANDON Manzano    Verbal order given to: Dasha Marcos RN           "

## 2021-07-05 ENCOUNTER — RECORDS - HEALTHEAST (OUTPATIENT)
Dept: LAB | Facility: CLINIC | Age: 61
End: 2021-07-05

## 2021-07-05 ENCOUNTER — RECORDS - HEALTHEAST (OUTPATIENT)
Dept: ADMINISTRATIVE | Facility: OTHER | Age: 61
End: 2021-07-05

## 2021-07-05 ENCOUNTER — COMMUNICATION - HEALTHEAST (OUTPATIENT)
Dept: GERIATRICS | Facility: CLINIC | Age: 61
End: 2021-07-05

## 2021-07-05 PROBLEM — E53.8 VITAMIN B12 DEFICIENCY (NON ANEMIC): Status: ACTIVE | Noted: 2020-09-02

## 2021-07-05 PROBLEM — F34.1 DYSTHYMIA: Status: ACTIVE | Noted: 2021-06-30

## 2021-07-05 PROBLEM — N28.89 LEFT RENAL MASS: Status: ACTIVE | Noted: 2020-10-22

## 2021-07-05 PROBLEM — M47.899: Status: ACTIVE | Noted: 2020-09-02

## 2021-07-05 PROBLEM — R56.9 SEIZURES (H): Status: ACTIVE | Noted: 2020-09-02

## 2021-07-05 PROBLEM — L60.8 NAIL DEFORMITY: Status: ACTIVE | Noted: 2020-11-02

## 2021-07-05 PROBLEM — N39.46 MIXED STRESS AND URGE URINARY INCONTINENCE: Status: ACTIVE | Noted: 2020-10-22

## 2021-07-05 PROBLEM — E78.5 HYPERLIPIDEMIA LDL GOAL <100: Status: ACTIVE | Noted: 2020-09-02

## 2021-07-05 PROBLEM — I61.9 LEFT-SIDED NONTRAUMATIC INTRACEREBRAL HEMORRHAGE, UNSPECIFIED CEREBRAL LOCATION (H): Status: ACTIVE | Noted: 2021-06-21

## 2021-07-05 PROBLEM — E03.9 ACQUIRED HYPOTHYROIDISM: Status: ACTIVE | Noted: 2020-09-02

## 2021-07-05 PROBLEM — Z86.39 HISTORY OF DIABETES WITH KETOACIDOSIS: Status: ACTIVE | Noted: 2021-07-04

## 2021-07-05 PROBLEM — I10 ESSENTIAL HYPERTENSION: Status: ACTIVE | Noted: 2020-09-02

## 2021-07-05 PROBLEM — R13.10 DYSPHAGIA: Status: ACTIVE | Noted: 2021-06-30

## 2021-07-05 PROBLEM — B36.9 FUNGAL DERMATITIS: Status: ACTIVE | Noted: 2020-11-02

## 2021-07-05 PROBLEM — Z86.39 HISTORY OF INSULIN DEPENDENT DIABETES MELLITUS: Status: ACTIVE | Noted: 2020-09-02

## 2021-07-05 PROBLEM — F01.50 VASCULAR DEMENTIA WITHOUT BEHAVIORAL DISTURBANCE (H): Status: ACTIVE | Noted: 2020-11-02

## 2021-07-05 PROBLEM — E55.9 VITAMIN D DEFICIENCY: Status: ACTIVE | Noted: 2020-09-02

## 2021-07-05 PROBLEM — Z72.0 TOBACCO USE: Status: ACTIVE | Noted: 2021-06-30

## 2021-07-05 NOTE — TELEPHONE ENCOUNTER
Telephone Encounter by Lali Christensen RN at 7/5/2021  5:49 PM     Author: Lali Christensen RN Service: -- Author Type: Registered Nurse    Filed: 7/5/2021  6:21 PM Encounter Date: 7/5/2021 Status: Signed    : Lali Christensen RN (Registered Nurse)       [facility] Nurse Triage Telephone Note      Provider: BRANDON Manzano  Facility: [facility]    Facility Type: TCU    Caller: Cathy  Call Back Number:  [phone]    Allergies: Bee pollen    Reason for call: Want clarification about IV NS orders from 7/2 said for 3L.  IV infiltrated last pm. Was order to have IV team start new IV.  I came in now & pt has no IV in, is pt done with IV?      Verbal Order/Direction given by Provider: Yes, DC IV order. Her BG's have improved & I changed her Lantus to 20u AM & 10uHS.    Provider giving order: BRANDON Manzano    Verbal order given to: Cathy Christensen RN

## 2021-07-06 VITALS
OXYGEN SATURATION: 95 % | TEMPERATURE: 98.5 F | DIASTOLIC BLOOD PRESSURE: 59 MMHG | RESPIRATION RATE: 16 BRPM | SYSTOLIC BLOOD PRESSURE: 122 MMHG | HEART RATE: 75 BPM

## 2021-07-06 VITALS — BODY MASS INDEX: 27.82 KG/M2 | HEIGHT: 65 IN | WEIGHT: 167.2 LBS

## 2021-07-07 ENCOUNTER — COMMUNICATION - HEALTHEAST (OUTPATIENT)
Dept: GERIATRICS | Facility: CLINIC | Age: 61
End: 2021-07-07

## 2021-07-07 LAB
ANION GAP SERPL CALCULATED.3IONS-SCNC: 12 MMOL/L (ref 5–18)
BUN SERPL-MCNC: 24 MG/DL (ref 8–22)
CALCIUM SERPL-MCNC: 9.1 MG/DL (ref 8.5–10.5)
CHLORIDE BLD-SCNC: 105 MMOL/L (ref 98–107)
CO2 SERPL-SCNC: 24 MMOL/L (ref 22–31)
CREAT SERPL-MCNC: 1.68 MG/DL (ref 0.6–1.1)
GFR SERPL CREATININE-BSD FRML MDRD: 31 ML/MIN/1.73M2
GLUCOSE BLD-MCNC: 273 MG/DL (ref 70–125)
POTASSIUM BLD-SCNC: 3.9 MMOL/L (ref 3.5–5)
SODIUM SERPL-SCNC: 141 MMOL/L (ref 136–145)

## 2021-07-07 NOTE — TELEPHONE ENCOUNTER
Telephone Encounter by Karrie Marcos RN at 7/7/2021  2:23 PM     Author: Karrie Marcos RN Service: -- Author Type: Registered Nurse    Filed: 7/7/2021  2:37 PM Encounter Date: 7/7/2021 Status: Signed    : Karrie Marcos RN (Registered Nurse)       [facility] Nurse Triage Telephone Note      Provider: BRANDON Manzano  Facility: [facility]    Facility Type: TCU    Caller: Dasha   Call Back Number:  [phone]    Allergies: Bee pollen    Reason for call:   BMP results, no cough no shortness of breath  Vitals: BP:  125/67  P:: 78  R:: 18  SPO2: 96% R/A Temp.:  97.8   BS this am 307 and lunch 337  Currently on Lantus 20units in the AM and 10units @ HS           Verbal Order/Direction given by Provider: Increase HS Lantus to 16units.     Provider giving order: BRANDON Manzano    Verbal order given to: Jordana Marcos RN

## 2021-07-09 ENCOUNTER — RECORDS - HEALTHEAST (OUTPATIENT)
Dept: LAB | Facility: CLINIC | Age: 61
End: 2021-07-09

## 2021-07-09 ENCOUNTER — TELEPHONE (OUTPATIENT)
Dept: NURSING | Facility: CLINIC | Age: 61
End: 2021-07-09

## 2021-07-09 ENCOUNTER — COMMUNICATION - HEALTHEAST (OUTPATIENT)
Dept: GERIATRICS | Facility: CLINIC | Age: 61
End: 2021-07-09

## 2021-07-09 DIAGNOSIS — Z86.39 HISTORY OF INSULIN DEPENDENT DIABETES MELLITUS: ICD-10-CM

## 2021-07-09 DIAGNOSIS — E10.9 DM TYPE 1 (DIABETES MELLITUS, TYPE 1) (H): Primary | ICD-10-CM

## 2021-07-09 LAB
ALBUMIN UR-MCNC: NEGATIVE G/DL
APPEARANCE UR: ABNORMAL
BACTERIA #/AREA URNS HPF: ABNORMAL /[HPF]
BILIRUB UR QL STRIP: NEGATIVE
COLOR UR AUTO: ABNORMAL
GLUCOSE UR STRIP-MCNC: ABNORMAL MG/DL
HGB UR QL STRIP: NEGATIVE
KETONES UR STRIP-MCNC: NEGATIVE MG/DL
LEUKOCYTE ESTERASE UR QL STRIP: NEGATIVE
NITRATE UR QL: NEGATIVE
PH UR STRIP: 5 [PH] (ref 5–8)
RBC URINE: 0 HPF
SP GR UR STRIP: 1.02 (ref 1–1.03)
SQUAMOUS EPITHELIAL: 0 /HPF
UROBILINOGEN UR STRIP-ACNC: ABNORMAL
WBC URINE: 1 HPF

## 2021-07-09 NOTE — TELEPHONE ENCOUNTER
.  [facility]: Nurse Triage Note  SITUATION/BACKGROUND                                                      Isabell Matthews is a 60 year old female who daughter, Vishal calls with concern of her mother's blood sugars fluctuations - High and this morning 489. Not living with daughter at this time.   Patient had a stroke. She was transferred from hospital and currently residing in [facility] for nearly 2 weeks.   Per daughter, patient is due to be discharged in about a week from [facility].    Daughter is not happy with the medication regimen at the Nursing home and feels that mother needs to be seen by Endocrinology... Or adjustment made for better glucose control.   This nurse advised daughter to have nurse at [facility] to report readings and possible adjustment to medication regimen and need to see Endocrinology to PCP to expedite request.   Offered to schedule appointment with Dr. Castaneda pending discharge. Daughter declined.   Routed to Primary Care as High Priority to review and follow up call to patient/ Maria Guadalupe (daughter) at  [phone].

## 2021-07-09 NOTE — TELEPHONE ENCOUNTER
Telephone Encounter by Karrie Marcos RN at 7/9/2021 10:58 AM     Author: Karrie Marcos RN Service: -- Author Type: Registered Nurse    Filed: 7/9/2021 11:27 AM Encounter Date: 7/9/2021 Status: Signed    : Karrie Marcos RN (Registered Nurse)       [facility] Nurse Triage Telephone Note      Provider: BRANDON Manzano  Facility[facility]    Facility Type: TCU    Caller: Laney  Call Back Number:  [phone]    Allergies: Bee pollen    Reason for call: Am BS reading was HI pt was given her Lantus of 20 units in the am and a total of 24units of aspart as she gets s/s and 6 units scheduled with meals and after her meal the recheck of BS was HI as well.. Pt is asymptomatic, had toast, eggs and coffee for breakfast.     Verbal Order/Direction given by Provider: Give an extra 10units of aspart now, recheck BS in 1hr.    Provider giving order: BRANDON Iniguez    Verbal order given to: Laney Marcos RN

## 2021-07-10 LAB — BACTERIA SPEC CULT: NORMAL

## 2021-07-12 ENCOUNTER — TELEPHONE (OUTPATIENT)
Dept: GERIATRICS | Facility: CLINIC | Age: 61
End: 2021-07-12

## 2021-07-12 ENCOUNTER — MEDICAL CORRESPONDENCE (OUTPATIENT)
Dept: HEALTH INFORMATION MANAGEMENT | Facility: CLINIC | Age: 61
End: 2021-07-12

## 2021-07-12 NOTE — TELEPHONE ENCOUNTER
[facility] Nurse Triage Telephone Note    Provider: BRANDON Manzano  Facility[facility]  Facility Type:  TCU    Caller: Gail  Call Back Number: [phone]    Allergies:    Allergies   Allergen Reactions     Pollen Extract Headache        Reason for call: 1630 BG 45, no symptoms, will have dinner in 30min. Given OJ.   Pt on Lantus 20u AM, 16u HS. Aspart 6u TID & sliding scale coverage TID.    Last HS BG 64- Lantus was held & pt given Might shake & OJ.  This 7am  & received her Lantus 20u & Aspart 6u plus sliding scale 16u.  Lunchtime BG= 186 received Aspart 6u plus sliding scale 4u    FYI_ Looking at potential Discharge hm with Dtr this Fri 7/16. Dtr hoping to stop the Aspart.    Verbal Order/Direction given by Provider: Give scheduled Aspart 6u as scheduled. Recheck BG @ HS.    Provider giving Order:  BRANDON Manzano    Verbal Order given to: David Christensen RN

## 2021-07-12 NOTE — TELEPHONE ENCOUNTER
RN called patient's daughter, Maria Guadalupe, and she asked for RN to call nurse manager, Gail Rand, at [facility] to have recent blood sugars faxed to [facility] so Dr. Castaneda can review.  Maria Guadalupe also asked for Dr. Castaneda to give advice for insulin orders, as Maria Guadalupe thinks that what patient has been given at [facility] is not adequate.  RN helped Maria Guadalupe to make an in person appt for patient for 21 with Dr. Castaneda for diabetes follow up and possible endo referral.  Maria Guadalupe specifically asked for Dr. Castaneda to send new order for Insulin to [facility] for Lantus 26 units at bedtime, and previous sliding scale orders for Novolog/Humalo units with food plus sliding scale see below usual dose 24 to 48 daily   Sent to pharmacy as: Insulin Lispro (1 Unit Dial) 100 UNIT/ML Subcutaneous Solution Pen-injector (HumaLOG KWIKPEN)   Class: E-Prescribe   Notes to Pharmacy: 4 [id number] to 200 5 units 201 to 250 6 units 251 to 300 7 units 301 to 400 8 units 401 to 450 9 units 451 to 500 High 14 units       RN called Gail and she reported that blood sugars have been variable, and at one point they thought patient might have a UTI, but urinalysis was negative.  Gail relayed that providers at facility have given the recent insulin orders, and that she supports those providers with the orders they have given.    Current insulin orders from [facility] are as follows:  Lantus is 20 units in the AM and 16 units @ bedtime  Novolog is 6 units with meals along with sliding scale  For blood sugar 141-180, give 2 units                             181-220, give 4 units                             221-260, give 6 units                             261-300, give 8 units                             301-340, give 10 units                             341-380, give 12 units                             381-420, give 14 units                             421-460, give 16 units                             451-500, give 18 units     Not  sure if Dr. Castaneda will want to send new orders as patient's daughter has requested.  [facility] number for Gail is [phone] and fax is [phone].  RN will await fax with blood sugars from Gail, and will route encounter to Dr. Castaneda for advice.    Dorina Argueta RN on 7/12/2021 at 12:34 PM

## 2021-07-13 ENCOUNTER — OFFICE VISIT (OUTPATIENT)
Dept: GERIATRICS | Facility: CLINIC | Age: 61
End: 2021-07-13
Payer: MEDICARE

## 2021-07-13 DIAGNOSIS — N39.46 MIXED STRESS AND URGE URINARY INCONTINENCE: ICD-10-CM

## 2021-07-13 DIAGNOSIS — Z79.4 TYPE 2 DIABETES MELLITUS WITH DIABETIC POLYNEUROPATHY, WITH LONG-TERM CURRENT USE OF INSULIN (H): ICD-10-CM

## 2021-07-13 DIAGNOSIS — E11.42 TYPE 2 DIABETES MELLITUS WITH DIABETIC POLYNEUROPATHY, WITH LONG-TERM CURRENT USE OF INSULIN (H): ICD-10-CM

## 2021-07-13 DIAGNOSIS — Z86.39 HISTORY OF DIABETES WITH KETOACIDOSIS: ICD-10-CM

## 2021-07-13 DIAGNOSIS — M47.899 OTHER OSTEOARTHRITIS OF SPINE, UNSPECIFIED SPINAL REGION: ICD-10-CM

## 2021-07-13 DIAGNOSIS — F34.1 DYSTHYMIA: ICD-10-CM

## 2021-07-13 DIAGNOSIS — I61.9 LEFT-SIDED NONTRAUMATIC INTRACEREBRAL HEMORRHAGE, UNSPECIFIED CEREBRAL LOCATION (H): Primary | ICD-10-CM

## 2021-07-13 DIAGNOSIS — F01.50 VASCULAR DEMENTIA WITHOUT BEHAVIORAL DISTURBANCE (H): ICD-10-CM

## 2021-07-13 DIAGNOSIS — N18.32 STAGE 3B CHRONIC KIDNEY DISEASE (H): ICD-10-CM

## 2021-07-13 DIAGNOSIS — I10 ESSENTIAL HYPERTENSION: ICD-10-CM

## 2021-07-13 PROCEDURE — 99316 NF DSCHRG MGMT 30 MIN+: CPT | Performed by: NURSE PRACTITIONER

## 2021-07-13 ASSESSMENT — MIFFLIN-ST. JEOR: SCORE: 1351.05

## 2021-07-14 NOTE — TELEPHONE ENCOUNTER
July 14, 2021  Patient in [facility] center needs to follow recommendations of [facility] center providers to optimize care. I am not able to provide orders at this time.  If she will continue in care center does not need visit with me as provider in [facility] will assess her medical needs. Once she discharges then will need a follow-up visit.  Please inform daughter.  Best wishes,  Bony Castaneda MD

## 2021-07-14 NOTE — TELEPHONE ENCOUNTER
RN left voice message for patient's daughter, Vishal, with Dr. Castaneda's message.  RN reviewed next appt date with Dr. Castaneda of 7/26/21, and gave [facility] number if Vishal has any other questions.    Dorina Argueta RN on 7/14/2021 at 2:23 PM

## 2021-07-14 NOTE — TELEPHONE ENCOUNTER
Fax was received from [facility] with blood sugar readings, these were scanned to chart.    Dorian Argueta RN on 7/14/2021 at 8:56 AM

## 2021-07-16 ENCOUNTER — TELEPHONE (OUTPATIENT)
Dept: FAMILY MEDICINE | Facility: CLINIC | Age: 61
End: 2021-07-16

## 2021-07-16 NOTE — TELEPHONE ENCOUNTER
Dr. Cervantes-  [facility] received a home care referral for Isabell from [facility], we had accepted it for a SN SOC for 7/24, we are able to move it up to 7/18 d/t availability. Thank you!  Valentine Wilson RN

## 2021-07-16 NOTE — TELEPHONE ENCOUNTER
July 16, 2021  Please clarify patient status. Is she in nursing home and will be discharged?  Bony Castaneda MD

## 2021-07-17 ENCOUNTER — DISCHARGE SUMMARY NURSING HOME (OUTPATIENT)
Dept: GERIATRICS | Facility: CLINIC | Age: 61
End: 2021-07-17
Payer: MEDICARE

## 2021-07-17 VITALS
OXYGEN SATURATION: 93 % | WEIGHT: 174.2 LBS | TEMPERATURE: 97.8 F | HEART RATE: 79 BPM | BODY MASS INDEX: 29.02 KG/M2 | SYSTOLIC BLOOD PRESSURE: 117 MMHG | DIASTOLIC BLOOD PRESSURE: 90 MMHG | RESPIRATION RATE: 18 BRPM | HEIGHT: 65 IN

## 2021-07-17 DIAGNOSIS — Z53.9 ERRONEOUS ENCOUNTER--DISREGARD: Primary | ICD-10-CM

## 2021-07-17 PROBLEM — Z79.4 TYPE 2 DIABETES MELLITUS WITH DIABETIC POLYNEUROPATHY, WITH LONG-TERM CURRENT USE OF INSULIN (H): Status: ACTIVE | Noted: 2021-07-17

## 2021-07-17 PROBLEM — E11.42 TYPE 2 DIABETES MELLITUS WITH DIABETIC POLYNEUROPATHY, WITH LONG-TERM CURRENT USE OF INSULIN (H): Status: ACTIVE | Noted: 2021-07-17

## 2021-07-17 PROBLEM — N18.30 CHRONIC KIDNEY DISEASE, STAGE 3 (H): Chronic | Status: ACTIVE | Noted: 2021-07-17

## 2021-07-18 NOTE — PROGRESS NOTES
[facility]    Name:   Isabell Matthews  : 1960  Facility:  [facility] [[id number]]  Room:   Code Status: DNR and POLST AVAILABLE -   MRN:   [id number]  DOS:  2021  Previous visit:  2021    PCP:  Bony Castaneda    CHIEF COMPLAINT / REASON FOR VISIT:  Chief Complaint   Patient presents with     Discharge Summary Nursing Home     Hypertensive left basal ganglia intracerebral hemorrhage      [facility] from 2021 until 2021 (nontraumatic intracerebral hemorrhage)  [facility] from 2021 until 2021 (expected discharge date)      HPI: Isabell is a 60 year old female with an extensive past medical history that includes hypertension, hyperlipidemia, diabetes mellitus type 2, CKD stage III, coronary artery disease (s/p PCI ), hypothyroidism, degenerative arthritis, and prior history of ischemic stroke, who presented to the ED with acute onset of facial droop, slurred speech, and right-sided weakness.  She was brought in by EMS after her daughter, Maria Guadalupe (who provides  care), noticed the patient was unsteady.      The patient has required assistance with sitting up, pivoting, and standing at baseline but was usually able to help with both arms.  When her daughter woke her up at noon (she was up until 1:30 AM), she found the patient unable to help sit herself up with the right arm.  When she got her to a standing position, she was visibly unstable and it nearly fell over.  She also had more difficulty than usual swallowing her pills, and seemed confused by instructions to take them one at the time as she typically does.  The daughter did explain that the patient is somewhat slow at baseline, particularly in the mornings, that the confusion, lethargy, and right-sided weakness was decidedly different enough from baseline, that she called EMS. Blood glucose level, checked by the  daughter prior to arrival, was only 133.  The patient is a brittle diabetic and does have a recent history of hospitalization for DKA.  Stroke code was activated on arrival to the ED.     No recent head trauma, recent illness, or medication changes.  She takes Keppra for history of the seizures thought related to multiple lacunar strokes in the past. There was no seizure-like activity the day of the stroke.  tPA was not given due to active bleeding.  No endovascular treatment was given due to absence of proximal vessel occlusion.     In the hospital, pneumatic compression devices were used for VTE prevention.  There is mention of vascular dementia, although the extent is not known.  In the hospital, she was alert and oriented to self and place but could not state the month.  While language was coherent with intact comprehension, it was notably slow.        CURRENT/RECENT TCU ISSUES     Disposition: Diagnosed with vascular dementia, she scored well, 12/15, on the BIMS.  Early on, she was very slow, presenting with a flat affect and seemingly perpetually somnolent.  She stated she was tired all the time.  Her flat affect was noted by multiple health professionals including myself. She told me she was a smoker and has tried the nicotine patch but likes the plastic cigarettes with nicotine.  According to documentation from the hospital, she gave up cigarettes 35 years ago.     A UA/conditional UC was performed on 07/09; however, it produced a mixture of urogenital organisms, none predominating.       SLP evaluated and treat for cognition related issues as well as swallowing.  She was discharged from the hospital on a puréed diet, but on 06/30, recommendation to upgrade to level 6 soft and bite-sized with thin liquids.     She earlier tried a 4 wheeled walker but, according to PT, was not really safe, and she was dragging her left foot.  Today, things seem to be going considerably better, although foot drag persists.   She was also more alert.     Diabetes mellitus: There appeared to be conflicting diagnoses.  Her discharge summary lists type 2 diabetes; however, there is also mention of type I.  She said that she has been a diabetic since her 30s.        Her home dose was 26 units of glargine daily plus sliding scale NovoLog with meals, starting at 151 where she would receive 2 units, adjusted every 50.  Since then, we have made adjustments to sliding scale (sliding scale NovoLog every 40 rather than every 50 and beginning at 141), increases to glargine; 32 units nightly on 07/02, then 20 units twice daily, and finally an adjustment to 20 units every morning and 16 units every evening due to a few low blood glucose levels (40s to 70s).  Blood glucose levels have improved (no longer in the 400s to 500s), although scheduled prandial dosing of 6 units seems to complicate matters, as it was not the way her diabetes was being managed prior to her hospitalization.  It should be noted that the patient's daughter contacted the PCP with a request to resume the 26 unit dose she had initially been taking, but this was declined, as he noted, most likely, that the situation has changed.  To simplify things, we are going to discontinue that and simply go with sliding scale coverage at meals.     On 07/02, we also ordered IV normal saline 3 L at 100 L/h.  A subsequent metabolic profile looked excellent.        ROS: She denies any feelings of depression, although she typically looks as such.  No headaches or chest pains, coughing or congestion, nausea or vomiting, dizziness or dyspnea, dysuria, constipation or diarrhea, integumentary issues, or problems with appetite or sleep.  She does have some dysphagia based on her puréed texture diet. As noted above, we are having SLP evaluate and treat.      Past Medical History:   Diagnosis Date     Chronic pain      Essential hypertension      Ex-cigarette smoker     quit 7/2018 over 35 years      Ex-cigarette smoker      Hyperlipidemia      Hypothyroid      Insulin-requiring or dependent type II diabetes mellitus (H)      Seizures (H)      Stroke (H)      Vascular dementia (H)               Family History   Problem Relation Age of Onset     Acute Myocardial Infarction Father      Heart Disease Maternal Grandmother      Dementia Maternal Grandmother      Myocardial Infarction Father      Dementia Paternal Grandmother      Heart Disease Paternal Grandmother      Social History     Socioeconomic History     Marital status:      Spouse name: Not on file     Number of children: Not on file     Years of education: Not on file     Highest education level: Not on file   Occupational History     Not on file   Tobacco Use     Smoking status: Former Smoker     Packs/day: 0.50     Years: 35.00     Pack years: 17.50     Types: Cigarettes     Quit date: 7/1/2018     Years since quitting: 3.0     Smokeless tobacco: Never Used   Substance and Sexual Activity     Alcohol use: Not Currently     Drug use: Not Currently     Sexual activity: Not Currently   Other Topics Concern     Parent/sibling w/ CABG, MI or angioplasty before 65F 55M? Not Asked   Social History Narrative    ** Merged History Encounter **         Recently moved to [facility] with Daughter Charli who is her pca     Social Determinants of Health     Financial Resource Strain:      Difficulty of Paying Living Expenses:    Food Insecurity:      Worried About Running Out of Food in the Last Year:      Ran Out of Food in the Last Year:    Transportation Needs:      Lack of Transportation (Medical):      Lack of Transportation (Non-Medical):    Physical Activity:      Days of Exercise per Week:      Minutes of Exercise per Session:    Stress:      Feeling of Stress :    Social Connections:      Frequency of Communication with Friends and Family:      Frequency of Social Gatherings with Friends and Family:      Attends [employer] Services:      Active Member of  Clubs or Organizations:      Attends Club or Organization Meetings:      Marital Status:    Intimate Partner Violence:      Fear of Current or Ex-Partner:      Emotionally Abused:      Physically Abused:      Sexually Abused:        MEDICATIONS: Reviewed from the MAR, physician orders, and/or earlier progress notes.  Post Discharge Medication Reconciliation Status: medication reconcilation previously completed during another office visit.  Current Outpatient Medications   Medication Sig     atorvastatin (LIPITOR) 40 MG tablet Take 1 tablet (40 mg) by mouth daily     blood glucose (NO BRAND SPECIFIED) test strip Use to test blood sugar 4-5 times daily or as directed.     carvedilol (COREG) 6.25 MG tablet 1 tablet (6.25 mg) by Oral or Feeding Tube route 2 times daily (with meals)     cholecalciferol, vitamin D3, 1,000 unit (25 mcg) tablet [CHOLECALCIFEROL, VITAMIN D3, 1,000 UNIT (25 MCG) TABLET] Take 25 mcg by mouth daily.     clotrimazole (LOTRIMIN) 1 % external cream Apply topically 2 times daily Until rash resolved     cyanocobalamin (VITAMIN B-12) 1000 MCG tablet Take 1 tablet (1,000 mcg) by mouth daily     diclofenac (VOLTAREN) 1 % topical gel Apply 2 g topically 4 times daily as needed for moderate pain     doxazosin (CARDURA) 1 MG tablet Take 1 tablet (1 mg) by mouth daily     DULoxetine (CYMBALTA) 20 MG capsule Take 1 capsule (20 mg) by mouth daily     insulin aspart (NOVOLOG PEN) 100 UNIT/ML pen Use with meals and at bedtime per sliding scale.  Route: Inject 0-10 Units Subcutaneous 4 times daily (with meals and nightly) Use with meals and at bedtime per sliding scale. (Patient taking differently: Use with meals and at bedtime per sliding scale.  Route: Inject 0-14 Units subcutaneously 2 times daily (with meals))     insulin glargine (LANTUS PEN) 100 UNIT/ML pen Inject 20 Units Subcutaneous At Bedtime (Patient taking differently: Inject 16 Units Subcutaneous At Bedtime )     insulin glargine (LANTUS) 100  "unit/mL vial [INSULIN GLARGINE (LANTUS) 100 UNIT/ML VIAL] Inject 20 Units under the skin every morning. And 16u HS     insulin pen needle (31G X 8 MM) 31G X 8 MM miscellaneous Use 4 pen needles daily or as directed.     levETIRAcetam (KEPPRA) 500 MG tablet Take 1 tablet (500 mg) by mouth 2 times daily     levothyroxine (SYNTHROID) 88 MCG tablet [LEVOTHYROXINE (SYNTHROID) 88 MCG TABLET] Take 88 mcg by mouth daily.     loratadine (CLARITIN) 10 mg tablet [LORATADINE (CLARITIN) 10 MG TABLET] Take 10 mg by mouth daily.     melatonin 10 mg Tab [MELATONIN 10 MG TAB] Take 10 mg by mouth daily.     NIFEdipine (ADALAT CC) 90 MG 24 hr tablet [NIFEDIPINE (ADALAT CC) 90 MG 24 HR TABLET] Take 90 mg by mouth daily.     pregabalin (LYRICA) 100 MG capsule Take 1 capsule (100 mg) by mouth 2 times daily For additional refills, please schedule a follow-up appointment at [phone]     SYNTHROID 88 MCG tablet Take 1 tablet (88 mcg) by mouth daily     terbinafine (LAMISIL) 1 % external cream Apply topically to gume of rash twice daily if needed     Vitamin D3 (CHOLECALCIFEROL) 25 mcg (1000 units) tablet Take 1 tablet (25 mcg) by mouth daily     No current facility-administered medications for this visit.     ALLERGIES:   Allergies   Allergen Reactions     Bee Pollen Headache     Pollen Extract Headache     DIET: Level 6 soft and bite-size with thin liquids, upgraded from puréed on 06/30/2021.    Vitals:    07/13/21 1627   BP: (!) 117/90   Pulse: 79   Resp: 18   Temp: 97.8  F (36.6  C)   SpO2: 93%   Weight: 79 kg (174 lb 3.2 oz)   Height: 1.651 m (5' 5\")   Weight gain of 7 pounds, possibly due to improved alertness.  Body mass index is 28.99 kg/m .    EXAMINATION:   General: Pleasant, alert, and much more conversant middle-aged female, working with physical therapy, in no apparent distress.  While her affect remains somewhat flat, she is clearly more engaged.  Head: Normocephalic and atraumatic.   Eyes: PERRLA, sclerae clear.  Report of " suspected mild right incongruent hemianopsia, although this is not assessable by me.    ENT: Moist oral mucosa.  She has her own teeth.  No rhinorrhea or nasal discharge.  Hearing seems unimpaired.  Cardiovascular: Regular rate and rhythm.   Respiratory: Lungs clear to auscultation bilaterally.   Abdomen: Nondistended.   Musculoskeletal/Extremities: Bilateral 1+ pretibial edema.  Integument: No rashes, clinically significant lesions, or skin breakdown.   Cognitive/Psychiatric: Possibly impaired cognitive function of unknown degree.  Affect remains somewhat flat, although the patient is clearly more alert and more engaged.    DIAGNOSTICS:        Recent Results (from the past 720 hour(s))   Glucose by meter    Collection Time: 06/21/21  4:11 PM   Result Value Ref Range    Glucose 258 (H) 70 - 99 mg/dL   Glucose by meter    Collection Time: 06/21/21  4:17 PM   Result Value Ref Range    Glucose 271 (H) 70 - 99 mg/dL   CBC with Platelets & Differential    Collection Time: 06/21/21  4:18 PM   Result Value Ref Range    WBC 7.0 4.0 - 11.0 10e9/L    RBC Count 4.77 3.8 - 5.2 10e12/L    Hemoglobin 13.2 11.7 - 15.7 g/dL    Hematocrit 41.3 35.0 - 47.0 %    MCV 87 78 - 100 fl    MCH 27.7 26.5 - 33.0 pg    MCHC 32.0 31.5 - 36.5 g/dL    RDW 15.1 (H) 10.0 - 15.0 %    Platelet Count 260 150 - 450 10e9/L    Diff Method Automated Method     % Neutrophils 73.1 %    % Lymphocytes 20.1 %    % Monocytes 5.7 %    % Eosinophils 0.4 %    % Basophils 0.4 %    % Immature Granulocytes 0.3 %    Nucleated RBCs 0 0 /100    Absolute Neutrophil 5.1 1.6 - 8.3 10e9/L    Absolute Lymphocytes 1.4 0.8 - 5.3 10e9/L    Absolute Monocytes 0.4 0.0 - 1.3 10e9/L    Absolute Eosinophils 0.0 0.0 - 0.7 10e9/L    Absolute Basophils 0.0 0.0 - 0.2 10e9/L    Abs Immature Granulocytes 0.0 0 - 0.4 10e9/L    Absolute Nucleated RBC 0.0    Basic metabolic panel    Collection Time: 06/21/21  4:18 PM   Result Value Ref Range    Sodium 134 133 - 144 mmol/L    Potassium 3.7  3.4 - 5.3 mmol/L    Chloride 101 94 - 109 mmol/L    Carbon Dioxide 25 20 - 32 mmol/L    Anion Gap 7 3 - 14 mmol/L    Glucose 250 (H) 70 - 99 mg/dL    Urea Nitrogen 29 7 - 30 mg/dL    Creatinine 1.56 (H) 0.52 - 1.04 mg/dL    GFR Estimate 35 (L) >60 mL/min/[1.73_m2]    GFR Estimate If Black 41 (L) >60 mL/min/[1.73_m2]    Calcium 9.0 8.5 - 10.1 mg/dL   INR    Collection Time: 06/21/21  4:18 PM   Result Value Ref Range    INR 0.91 0.86 - 1.14   Partial thromboplastin time    Collection Time: 06/21/21  4:18 PM   Result Value Ref Range    PTT 30 22 - 37 sec   Troponin I    Collection Time: 06/21/21  4:18 PM   Result Value Ref Range    Troponin I ES <0.015 0.000 - 0.045 ug/L   ISTAT INR POCT    Collection Time: 06/21/21  4:19 PM   Result Value Ref Range    ISTAT INR 1.0 0.86 - 1.14   Creatinine POCT    Collection Time: 06/21/21  4:19 PM   Result Value Ref Range    Creatinine 1.7 (H) 0.52 - 1.04 mg/dL    GFR Estimate 30 (L) >60 mL/min/[1.73_m2]    GFR Estimate If Black 37 (L) >60 mL/min/[1.73_m2]   CT Head w/o Contrast    Collection Time: 06/21/21  4:37 PM   Result Value Ref Range    Radiologist flags Left basal ganglia intraparenchymal hemorrhage (Urgent)    CTA Head Neck with Contrast    Collection Time: 06/21/21  4:39 PM   Result Value Ref Range    Radiologist flags Left basal ganglia intraparenchymal hemorrhage (Urgent)    Asymptomatic SARS-CoV-2 COVID-19 Virus (Coronavirus) by PCR    Collection Time: 06/21/21  4:57 PM    Specimen: Nasopharyngeal   Result Value Ref Range    SARS-CoV-2 Virus Specimen Source Nasopharyngeal     SARS-CoV-2 PCR Result NEGATIVE     SARS-CoV-2 PCR Comment       Testing was performed using the [employer] Xpress SARS-CoV-2 Assay on the Cepheid Gene-Xpert   Instrument Systems. Additional information about this Emergency Use Authorization (EUA)   assay can be found via the Lab Guide.     EKG 12-lead, tracing only    Collection Time: 06/21/21  5:00 PM   Result Value Ref Range    Interpretation ECG Click  View Image link to view waveform and result    Glucose by meter    Collection Time: 06/21/21  6:50 PM   Result Value Ref Range    Glucose 193 (H) 70 - 99 mg/dL   EKG 12-lead    Collection Time: 06/21/21  7:09 PM   Result Value Ref Range    Interpretation ECG Click View Image link to view waveform and result    Hemoglobin A1c    Collection Time: 06/21/21  8:11 PM   Result Value Ref Range    Hemoglobin A1C 7.8 (H) 0 - 5.6 %   Glucose by meter    Collection Time: 06/21/21  8:32 PM   Result Value Ref Range    Glucose 158 (H) 70 - 99 mg/dL   Methicillin Resist/Sens S. aureus PCR    Collection Time: 06/21/21  8:48 PM    Specimen: Nares   Result Value Ref Range    Specimen Description Nares     Methicillin Resist/Sens S. aureus PCR Negative NEG^Negative   Glucose by meter    Collection Time: 06/22/21 12:23 AM   Result Value Ref Range    Glucose 111 (H) 70 - 99 mg/dL   Glucose by meter    Collection Time: 06/22/21  3:53 AM   Result Value Ref Range    Glucose 70 70 - 99 mg/dL   CBC with platelets    Collection Time: 06/22/21  4:26 AM   Result Value Ref Range    WBC 5.6 4.0 - 11.0 10e9/L    RBC Count 4.43 3.8 - 5.2 10e12/L    Hemoglobin 12.0 11.7 - 15.7 g/dL    Hematocrit 38.0 35.0 - 47.0 %    MCV 86 78 - 100 fl    MCH 27.1 26.5 - 33.0 pg    MCHC 31.6 31.5 - 36.5 g/dL    RDW 15.3 (H) 10.0 - 15.0 %    Platelet Count 250 150 - 450 10e9/L   Basic metabolic panel    Collection Time: 06/22/21  4:26 AM   Result Value Ref Range    Sodium 140 133 - 144 mmol/L    Potassium 3.0 (L) 3.4 - 5.3 mmol/L    Chloride 105 94 - 109 mmol/L    Carbon Dioxide 28 20 - 32 mmol/L    Anion Gap 6 3 - 14 mmol/L    Glucose 171 (H) 70 - 99 mg/dL    Urea Nitrogen 24 7 - 30 mg/dL    Creatinine 1.56 (H) 0.52 - 1.04 mg/dL    GFR Estimate 35 (L) >60 mL/min/[1.73_m2]    GFR Estimate If Black 41 (L) >60 mL/min/[1.73_m2]    Calcium 8.8 8.5 - 10.1 mg/dL   Glucose by meter    Collection Time: 06/22/21  4:52 AM   Result Value Ref Range    Glucose 152 (H) 70 - 99  mg/dL   Glucose by meter    Collection Time: 06/22/21  9:04 AM   Result Value Ref Range    Glucose 195 (H) 70 - 99 mg/dL   Glucose by meter    Collection Time: 06/22/21 12:36 PM   Result Value Ref Range    Glucose 329 (H) 70 - 99 mg/dL   Glucose by meter    Collection Time: 06/22/21  5:12 PM   Result Value Ref Range    Glucose 349 (H) 70 - 99 mg/dL   Glucose by meter    Collection Time: 06/22/21  6:55 PM   Result Value Ref Range    Glucose 319 (H) 70 - 99 mg/dL   Glucose by meter    Collection Time: 06/22/21  7:40 PM   Result Value Ref Range    Glucose 265 (H) 70 - 99 mg/dL   Glucose by meter    Collection Time: 06/22/21  9:56 PM   Result Value Ref Range    Glucose 349 (H) 70 - 99 mg/dL   Glucose by meter    Collection Time: 06/23/21  1:52 AM   Result Value Ref Range    Glucose 161 (H) 70 - 99 mg/dL   UA with Microscopic reflex to Culture    Collection Time: 06/23/21  1:55 AM    Specimen: Urine catheter; Catheterized Urine   Result Value Ref Range    Color Urine Yellow     Appearance Urine Slightly Cloudy     Glucose Urine 300 (A) NEG^Negative mg/dL    Bilirubin Urine Negative NEG^Negative    Ketones Urine Negative NEG^Negative mg/dL    Specific Gravity Urine 1.018 1.003 - 1.035    Blood Urine Negative NEG^Negative    pH Urine 6.5 5.0 - 7.0 pH    Protein Albumin Urine 10 (A) NEG^Negative mg/dL    Urobilinogen mg/dL Normal 0.0 - 2.0 mg/dL    Nitrite Urine Negative NEG^Negative    Leukocyte Esterase Urine Negative NEG^Negative    Source Catheterized Urine     WBC Urine 0 0 - 5 /HPF    RBC Urine 0 0 - 2 /HPF    Squamous Epithelial /HPF Urine 0 0 - 1 /HPF    Mucous Urine Present (A) NEG^Negative /LPF   CBC with platelets    Collection Time: 06/23/21  6:54 AM   Result Value Ref Range    WBC 5.3 4.0 - 11.0 10e9/L    RBC Count 4.91 3.8 - 5.2 10e12/L    Hemoglobin 13.4 11.7 - 15.7 g/dL    Hematocrit 42.5 35.0 - 47.0 %    MCV 87 78 - 100 fl    MCH 27.3 26.5 - 33.0 pg    MCHC 31.5 31.5 - 36.5 g/dL    RDW 15.3 (H) 10.0 - 15.0  %    Platelet Count 254 150 - 450 10e9/L   Basic metabolic panel    Collection Time: 06/23/21  6:54 AM   Result Value Ref Range    Sodium 140 133 - 144 mmol/L    Potassium 3.2 (L) 3.4 - 5.3 mmol/L    Chloride 107 94 - 109 mmol/L    Carbon Dioxide 27 20 - 32 mmol/L    Anion Gap 6 3 - 14 mmol/L    Glucose 156 (H) 70 - 99 mg/dL    Urea Nitrogen 25 7 - 30 mg/dL    Creatinine 1.49 (H) 0.52 - 1.04 mg/dL    GFR Estimate 37 (L) >60 mL/min/[1.73_m2]    GFR Estimate If Black 43 (L) >60 mL/min/[1.73_m2]    Calcium 9.3 8.5 - 10.1 mg/dL   Magnesium    Collection Time: 06/23/21  6:54 AM   Result Value Ref Range    Magnesium 2.3 1.6 - 2.3 mg/dL   Phosphorus    Collection Time: 06/23/21  6:54 AM   Result Value Ref Range    Phosphorus 3.5 2.5 - 4.5 mg/dL   Glucose by meter    Collection Time: 06/23/21  7:49 AM   Result Value Ref Range    Glucose 162 (H) 70 - 99 mg/dL   Glucose by meter    Collection Time: 06/23/21 11:38 AM   Result Value Ref Range    Glucose 379 (H) 70 - 99 mg/dL   Glucose by meter    Collection Time: 06/23/21  5:00 PM   Result Value Ref Range    Glucose 112 (H) 70 - 99 mg/dL   Glucose by meter    Collection Time: 06/23/21  9:22 PM   Result Value Ref Range    Glucose 131 (H) 70 - 99 mg/dL   UA with Microscopic    Collection Time: 06/23/21  9:45 PM   Result Value Ref Range    Color Urine Light Yellow     Appearance Urine Clear     Glucose Urine Negative NEG^Negative mg/dL    Bilirubin Urine Negative NEG^Negative    Ketones Urine Negative NEG^Negative mg/dL    Specific Gravity Urine 1.013 1.003 - 1.035    Blood Urine Negative NEG^Negative    pH Urine 6.0 5.0 - 7.0 pH    Protein Albumin Urine 10 (A) NEG^Negative mg/dL    Urobilinogen mg/dL Normal 0.0 - 2.0 mg/dL    Nitrite Urine Negative NEG^Negative    Leukocyte Esterase Urine Negative NEG^Negative    Source Urine     WBC Urine 0 0 - 5 /HPF    RBC Urine <1 0 - 2 /HPF    Squamous Epithelial /HPF Urine <1 0 - 1 /HPF    Mucous Urine Present (A) NEG^Negative /LPF     Amorphous Crystals Few (A) NEG^Negative /HPF   Urine Culture    Collection Time: 06/23/21  9:45 PM    Specimen: Urine clean catch; Midstream Urine   Result Value Ref Range    Specimen Description Midstream Urine     Special Requests Specimen received in preservative     Culture Micro       >100,000 colonies/mL  mixed urogenital isaias  Susceptibility testing not routinely done      Culture Micro       Multiple morphotypes present with no predominant organism.  Growth consistent with   probable contamination during collection.  Suggest repeat specimen if clinically   indicated.     Glucose by meter    Collection Time: 06/24/21  4:00 AM   Result Value Ref Range    Glucose 65 (L) 70 - 99 mg/dL   Glucose by meter    Collection Time: 06/24/21  4:27 AM   Result Value Ref Range    Glucose 61 (L) 70 - 99 mg/dL   Glucose by meter    Collection Time: 06/24/21  5:03 AM   Result Value Ref Range    Glucose 144 (H) 70 - 99 mg/dL   Phosphorus    Collection Time: 06/24/21  7:18 AM   Result Value Ref Range    Phosphorus 3.7 2.5 - 4.5 mg/dL   CBC with platelets    Collection Time: 06/24/21  7:18 AM   Result Value Ref Range    WBC 6.3 4.0 - 11.0 10e9/L    RBC Count 4.54 3.8 - 5.2 10e12/L    Hemoglobin 12.4 11.7 - 15.7 g/dL    Hematocrit 39.6 35.0 - 47.0 %    MCV 87 78 - 100 fl    MCH 27.3 26.5 - 33.0 pg    MCHC 31.3 (L) 31.5 - 36.5 g/dL    RDW 15.7 (H) 10.0 - 15.0 %    Platelet Count 246 150 - 450 10e9/L   Basic metabolic panel    Collection Time: 06/24/21  7:18 AM   Result Value Ref Range    Sodium 141 133 - 144 mmol/L    Potassium 3.0 (L) 3.4 - 5.3 mmol/L    Chloride 109 94 - 109 mmol/L    Carbon Dioxide 27 20 - 32 mmol/L    Anion Gap 5 3 - 14 mmol/L    Glucose 120 (H) 70 - 99 mg/dL    Urea Nitrogen 22 7 - 30 mg/dL    Creatinine 1.62 (H) 0.52 - 1.04 mg/dL    GFR Estimate 33 (L) >60 mL/min/[1.73_m2]    GFR Estimate If Black 39 (L) >60 mL/min/[1.73_m2]    Calcium 9.1 8.5 - 10.1 mg/dL   Magnesium    Collection Time: 06/24/21  7:18 AM    Result Value Ref Range    Magnesium 2.1 1.6 - 2.3 mg/dL   Glucose by meter    Collection Time: 06/24/21  8:18 AM   Result Value Ref Range    Glucose 108 (H) 70 - 99 mg/dL   Glucose by meter    Collection Time: 06/24/21 12:15 PM   Result Value Ref Range    Glucose 49 (LL) 70 - 99 mg/dL   Glucose by meter    Collection Time: 06/24/21 12:32 PM   Result Value Ref Range    Glucose 177 (H) 70 - 99 mg/dL   UA with Microscopic reflex to Culture    Collection Time: 06/24/21  1:20 PM    Specimen: Urine catheter; Catheterized Urine   Result Value Ref Range    Color Urine Light Yellow     Appearance Urine Clear     Glucose Urine Negative NEG^Negative mg/dL    Bilirubin Urine Negative NEG^Negative    Ketones Urine Negative NEG^Negative mg/dL    Specific Gravity Urine 1.014 1.003 - 1.035    Blood Urine Negative NEG^Negative    pH Urine 6.0 5.0 - 7.0 pH    Protein Albumin Urine 10 (A) NEG^Negative mg/dL    Urobilinogen mg/dL Normal 0.0 - 2.0 mg/dL    Nitrite Urine Negative NEG^Negative    Leukocyte Esterase Urine Negative NEG^Negative    Source Catheterized Urine     WBC Urine <1 0 - 5 /HPF    RBC Urine <1 0 - 2 /HPF    Bacteria Urine Few (A) NEG^Negative /HPF    Squamous Epithelial /HPF Urine <1 0 - 1 /HPF    Amorphous Crystals Few (A) NEG^Negative /HPF   Glucose by meter    Collection Time: 06/24/21  4:25 PM   Result Value Ref Range    Glucose 122 (H) 70 - 99 mg/dL   Potassium    Collection Time: 06/24/21  8:34 PM   Result Value Ref Range    Potassium 4.8 3.4 - 5.3 mmol/L   Glucose by meter    Collection Time: 06/24/21  8:52 PM   Result Value Ref Range    Glucose 424 (H) 70 - 99 mg/dL   Glucose by meter    Collection Time: 06/25/21  2:36 AM   Result Value Ref Range    Glucose 148 (H) 70 - 99 mg/dL   CBC with platelets    Collection Time: 06/25/21  6:10 AM   Result Value Ref Range    WBC 5.9 4.0 - 11.0 10e9/L    RBC Count 4.18 3.8 - 5.2 10e12/L    Hemoglobin 11.5 (L) 11.7 - 15.7 g/dL    Hematocrit 36.0 35.0 - 47.0 %    MCV 86  78 - 100 fl    MCH 27.5 26.5 - 33.0 pg    MCHC 31.9 31.5 - 36.5 g/dL    RDW 15.5 (H) 10.0 - 15.0 %    Platelet Count 241 150 - 450 10e9/L   Basic metabolic panel    Collection Time: 06/25/21  6:10 AM   Result Value Ref Range    Sodium 141 133 - 144 mmol/L    Potassium 3.2 (L) 3.4 - 5.3 mmol/L    Chloride 109 94 - 109 mmol/L    Carbon Dioxide 27 20 - 32 mmol/L    Anion Gap 5 3 - 14 mmol/L    Glucose 70 70 - 99 mg/dL    Urea Nitrogen 23 7 - 30 mg/dL    Creatinine 1.52 (H) 0.52 - 1.04 mg/dL    GFR Estimate 36 (L) >60 mL/min/[1.73_m2]    GFR Estimate If Black 42 (L) >60 mL/min/[1.73_m2]    Calcium 8.8 8.5 - 10.1 mg/dL   Glucose by meter    Collection Time: 06/25/21 11:59 AM   Result Value Ref Range    Glucose 280 (H) 70 - 99 mg/dL   Glucose by meter    Collection Time: 06/25/21  5:16 PM   Result Value Ref Range    Glucose 376 (H) 70 - 99 mg/dL   Glucose by meter    Collection Time: 06/25/21  9:47 PM   Result Value Ref Range    Glucose 186 (H) 70 - 99 mg/dL   Potassium    Collection Time: 06/25/21 10:49 PM   Result Value Ref Range    Potassium 5.2 3.4 - 5.3 mmol/L   Glucose by meter    Collection Time: 06/26/21  2:05 AM   Result Value Ref Range    Glucose 202 (H) 70 - 99 mg/dL   CBC with platelets    Collection Time: 06/26/21  6:34 AM   Result Value Ref Range    WBC 5.8 4.0 - 11.0 10e9/L    RBC Count 4.32 3.8 - 5.2 10e12/L    Hemoglobin 11.8 11.7 - 15.7 g/dL    Hematocrit 37.6 35.0 - 47.0 %    MCV 87 78 - 100 fl    MCH 27.3 26.5 - 33.0 pg    MCHC 31.4 (L) 31.5 - 36.5 g/dL    RDW 15.4 (H) 10.0 - 15.0 %    Platelet Count 248 150 - 450 10e9/L   Basic metabolic panel    Collection Time: 06/26/21  6:34 AM   Result Value Ref Range    Sodium 140 133 - 144 mmol/L    Potassium 4.0 3.4 - 5.3 mmol/L    Chloride 108 94 - 109 mmol/L    Carbon Dioxide 25 20 - 32 mmol/L    Anion Gap 6 3 - 14 mmol/L    Glucose 172 (H) 70 - 99 mg/dL    Urea Nitrogen 21 7 - 30 mg/dL    Creatinine 1.62 (H) 0.52 - 1.04 mg/dL    GFR Estimate 33 (L) >60  mL/min/[1.73_m2]    GFR Estimate If Black 39 (L) >60 mL/min/[1.73_m2]    Calcium 9.4 8.5 - 10.1 mg/dL   Magnesium    Collection Time: 06/26/21  6:34 AM   Result Value Ref Range    Magnesium 2.1 1.6 - 2.3 mg/dL   Phosphorus    Collection Time: 06/26/21  6:34 AM   Result Value Ref Range    Phosphorus 2.8 2.5 - 4.5 mg/dL   Potassium    Collection Time: 06/26/21 10:59 AM   Result Value Ref Range    Potassium 4.0 3.4 - 5.3 mmol/L   Glucose by meter    Collection Time: 06/26/21 12:41 PM   Result Value Ref Range    Glucose 123 (H) 70 - 99 mg/dL   Glucose by meter    Collection Time: 06/26/21  5:12 PM   Result Value Ref Range    Glucose 107 (H) 70 - 99 mg/dL   Glucose by meter    Collection Time: 06/26/21  9:32 PM   Result Value Ref Range    Glucose 330 (H) 70 - 99 mg/dL   Glucose by meter    Collection Time: 06/27/21  1:30 AM   Result Value Ref Range    Glucose 244 (H) 70 - 99 mg/dL   CBC with platelets    Collection Time: 06/27/21  7:59 AM   Result Value Ref Range    WBC 5.3 4.0 - 11.0 10e9/L    RBC Count 4.37 3.8 - 5.2 10e12/L    Hemoglobin 12.0 11.7 - 15.7 g/dL    Hematocrit 37.9 35.0 - 47.0 %    MCV 87 78 - 100 fl    MCH 27.5 26.5 - 33.0 pg    MCHC 31.7 31.5 - 36.5 g/dL    RDW 15.3 (H) 10.0 - 15.0 %    Platelet Count 230 150 - 450 10e9/L   Basic metabolic panel    Collection Time: 06/27/21  7:59 AM   Result Value Ref Range    Sodium 144 133 - 144 mmol/L    Potassium 3.5 3.4 - 5.3 mmol/L    Chloride 112 (H) 94 - 109 mmol/L    Carbon Dioxide 28 20 - 32 mmol/L    Anion Gap 4 3 - 14 mmol/L    Glucose 49 (LL) 70 - 99 mg/dL    Urea Nitrogen 24 7 - 30 mg/dL    Creatinine 1.48 (H) 0.52 - 1.04 mg/dL    GFR Estimate 37 (L) >60 mL/min/[1.73_m2]    GFR Estimate If Black 43 (L) >60 mL/min/[1.73_m2]    Calcium 8.9 8.5 - 10.1 mg/dL   Phosphorus    Collection Time: 06/27/21  7:59 AM   Result Value Ref Range    Phosphorus 3.9 2.5 - 4.5 mg/dL   Glucose by meter    Collection Time: 06/27/21  8:42 AM   Result Value Ref Range    Glucose 30  (LL) 70 - 99 mg/dL   Glucose by meter    Collection Time: 06/27/21  8:49 AM   Result Value Ref Range    Glucose 182 (H) 70 - 99 mg/dL   Glucose by meter    Collection Time: 06/27/21 12:04 PM   Result Value Ref Range    Glucose 158 (H) 70 - 99 mg/dL   Basic metabolic panel    Collection Time: 07/07/21  6:30 AM   Result Value Ref Range    Sodium 141 136 - 145 mmol/L    Potassium 3.9 3.5 - 5.0 mmol/L    Chloride 105 98 - 107 mmol/L    Carbon Dioxide (CO2) 24 22 - 31 mmol/L    Anion Gap 12 5 - 18 mmol/L    Glucose 273 (H) 70 - 125 mg/dL    Calcium 9.1 8.5 - 10.5 mg/dL    Urea Nitrogen 24 (H) 8 - 22 mg/dL    Creatinine 1.68 (H) 0.60 - 1.10 mg/dL    GFR Estimate If Black 37 (L) >60 mL/min/1.73m2    GFR Estimate 31 (L) >60 mL/min/1.73m2   Urine Culture - Historical    Collection Time: 07/09/21 10:00 AM    Specimen: Urine   Result Value Ref Range    Culture Mixture of urogenital organisms    UA with Microscopic    Collection Time: 07/09/21 10:00 AM   Result Value Ref Range    Color Urine Light Yellow Light Yellow, Yellow    Appearance Urine Slightly Cloudy (A) Clear    Glucose Urine >1000 mg/dL (A) Negative    Protein Albumin Urine Negative Negative    Bilirubin Urine Negative Negative    Urobilinogen Urine <2.0 mg/dL <2.0 mg/dL    pH Urine 5.0 5.0 - 8.0    Blood Urine Negative Negative    Ketones Urine Negative Negative    Nitrite Urine Negative Negative    Leukocyte Esterase Urine Negative Negative    Specific Gravity Urine 1.020 1.001 - 1.030    RBC Urine 0 <=2 hpf    WBC Urine 1 <=5 hpf    Bacteria Urine Many (A) None Seen    Squamous Epithelials Urine 0 <=5 /HPF     Estimated Creatinine Clearance: 36.1 mL/min (A) (based on SCr of 1.68 mg/dL (H)).      ASSESSMENT/Plan:     Diagnosis Comments   1. Left-sided nontraumatic intracerebral hemorrhage, unspecified cerebral location (H)   some improvement noted   2. Type 2 diabetes mellitus with diabetic polyneuropathy, with long-term current use of insulin (H)   discontinue  scheduled prandial NovoLog (6 units).  Continue with sliding-scale coverage at mealtime.   3. Stage 3b chronic kidney disease   avoid nephrotoxic agents   4. Vascular dementia without behavioral disturbance (H)   cognitive testing varied   5. Dysthymia   persistently flat affect.  She is on an antidepressant.   6. Mixed stress and urge urinary incontinence   chronic   7. History of diabetes with ketoacidosis   brittle diabetic   8. Essential hypertension   recent blood pressures acceptable   9. Other osteoarthritis of spine, unspecified spinal region   pain control appears adequate     CHANGES:    Discontinue scheduled prandial NovoLog (6 units).  Continue with sliding scale coverage at mealtime.      DISCHARGE PLAN/FACE TO FACE:  I certify that this patient is under my care and that I had a face-to-face encounter that meets the physician face-to-face encounter requirements with this patient.     Date of Face-to-Face Encounter:  07/13/2021     I certify that, based on my findings, the following services are medically necessary home health services:  Home health nurse, home health aide, home PT, home OT, and     My clinical findings support the need for the above skilled services because: (Please write a brief narrative summary that describes what the RN, PT, SLP, or other services will be doing in the home. A list of diagnoses in this section does not meet the CMS requirements):  Home health nurse for medication/diabetes management/teaching; home health aide to assist with bathing and ADL needs; home PT for strengthening, balance, endurance, and safety with mobility/ambulation; home OT for strengthening, ADL needs, adaptive equipment, and safety;  to address and coordinate the needs of a vulnerable adult    This patient is homebound because: (Please write a brief narrative summmary describing the functional limitations as to why this patient is homebound and specifically what makes this  patient homebound.):   Above services necessarily need to be performed in the home to be of benefit.    The patient is, or has been, under my care and I have initiated the establishment of the plan of care. This patient will be followed by a physician who will periodically review the plan of care.  Initial follow-up should be within 7-10 days.    Approximate time spent with this patient was greater than 30 minutes with greater than 50% spent in discussions regarding services required upon discharge.      The above has been created using voice recognition software. Please be aware that this may unintentionally  produce inaccuracies and/or nonsensical sentences.      Electronically signed by: PEGGY Hammond CNP

## 2021-07-19 ENCOUNTER — TELEPHONE (OUTPATIENT)
Dept: FAMILY MEDICINE | Facility: CLINIC | Age: 61
End: 2021-07-19

## 2021-07-19 NOTE — TELEPHONE ENCOUNTER
" [facility]    Phone Message    May a detailed message be left on voicemail: yes     Reason for Call: Order(s): Home Care Orders: Skilled Nursin time per week for 9 weeks, 3 PRN's.   Consult for OT/PT/Speech/Social Work;   Home addie aid visit for bathing and grooming, 2 times per week for 9 weeks.   Daughter is looking for order for \"purewick external catheter\".       Action Taken: Message routed to:  [facility] pcc    Travel Screening: Not Applicable                                                                        "

## 2021-07-20 NOTE — TELEPHONE ENCOUNTER
Unable to leave a   Toshia Peñaloza EMT at 2:22 PM on 7/20/2021.  [facility]  [facility]  [place]  [phone]

## 2021-07-21 ENCOUNTER — TELEPHONE (OUTPATIENT)
Dept: FAMILY MEDICINE | Facility: CLINIC | Age: 61
End: 2021-07-21

## 2021-07-22 NOTE — TELEPHONE ENCOUNTER
I called and left message verbally approving requested HC orders below.  Toshia Peñaloza, EMT at 1:38 PM on 7/22/2021.  [facility]  [facility]  [phone]

## 2021-07-23 ENCOUNTER — TELEPHONE (OUTPATIENT)
Dept: FAMILY MEDICINE | Facility: CLINIC | Age: 61
End: 2021-07-23

## 2021-07-23 NOTE — TELEPHONE ENCOUNTER
[facility]    Phone Message    May a detailed message be left on voicemail: yes     Reason for Call: Order(s): Home Care Orders: Physical Therapy (PT): twice a week for six weeks for strengthening, balance and to work on walking.     Action Taken: Message routed to:  [facility] pcc    Travel Screening: Not Applicable

## 2021-07-23 NOTE — TELEPHONE ENCOUNTER
[facility]    Phone Message    May a detailed message be left on voicemail: yes     Reason for Call: Order(s): Home Care Orders: Occupational Therapy (OT): .Requesting extension for verbal occupational therapy evaluation orders.    Action Taken: Message routed to:  [facility] primary care clinic    Travel Screening: Not Applicable

## 2021-07-26 ENCOUNTER — OFFICE VISIT (OUTPATIENT)
Dept: FAMILY MEDICINE | Facility: CLINIC | Age: 61
End: 2021-07-26
Payer: MEDICARE

## 2021-07-26 VITALS
SYSTOLIC BLOOD PRESSURE: 112 MMHG | OXYGEN SATURATION: 95 % | WEIGHT: 163 LBS | DIASTOLIC BLOOD PRESSURE: 78 MMHG | HEART RATE: 87 BPM | BODY MASS INDEX: 27.12 KG/M2

## 2021-07-26 DIAGNOSIS — M47.899 OTHER OSTEOARTHRITIS OF SPINE, UNSPECIFIED SPINAL REGION: ICD-10-CM

## 2021-07-26 DIAGNOSIS — I61.9 LEFT-SIDED NONTRAUMATIC INTRACEREBRAL HEMORRHAGE, UNSPECIFIED CEREBRAL LOCATION (H): ICD-10-CM

## 2021-07-26 DIAGNOSIS — N39.46 MIXED STRESS AND URGE URINARY INCONTINENCE: ICD-10-CM

## 2021-07-26 DIAGNOSIS — I10 BENIGN ESSENTIAL HYPERTENSION: ICD-10-CM

## 2021-07-26 DIAGNOSIS — N18.32 STAGE 3B CHRONIC KIDNEY DISEASE (H): ICD-10-CM

## 2021-07-26 DIAGNOSIS — Z86.39 HISTORY OF INSULIN DEPENDENT DIABETES MELLITUS: ICD-10-CM

## 2021-07-26 DIAGNOSIS — G62.9 NEUROPATHY: ICD-10-CM

## 2021-07-26 DIAGNOSIS — F01.50: ICD-10-CM

## 2021-07-26 DIAGNOSIS — E78.5 HYPERLIPIDEMIA LDL GOAL <70: ICD-10-CM

## 2021-07-26 DIAGNOSIS — E10.59 TYPE 1 DIABETES MELLITUS WITH OTHER CIRCULATORY COMPLICATION (H): Primary | ICD-10-CM

## 2021-07-26 DIAGNOSIS — E03.9 ACQUIRED HYPOTHYROIDISM: ICD-10-CM

## 2021-07-26 PROCEDURE — 99215 OFFICE O/P EST HI 40 MIN: CPT | Performed by: FAMILY MEDICINE

## 2021-07-26 RX ORDER — INSULIN LISPRO 100 [IU]/ML
INJECTION, SOLUTION INTRAVENOUS; SUBCUTANEOUS
Qty: 9 ML | Refills: 4 | Status: SHIPPED | OUTPATIENT
Start: 2021-07-26 | End: 2021-08-13

## 2021-07-26 RX ORDER — LEVOTHYROXINE SODIUM 88 MCG
88 TABLET ORAL DAILY
Qty: 90 TABLET | Refills: 3 | Status: SHIPPED | OUTPATIENT
Start: 2021-07-26 | End: 2022-07-27

## 2021-07-26 RX ORDER — DOXAZOSIN 1 MG/1
1 TABLET ORAL AT BEDTIME
Qty: 90 TABLET | Refills: 3 | Status: SHIPPED | OUTPATIENT
Start: 2021-07-26 | End: 2022-09-12

## 2021-07-26 RX ORDER — PREGABALIN 100 MG/1
100 CAPSULE ORAL 2 TIMES DAILY
Qty: 60 CAPSULE | Refills: 0 | Status: SHIPPED | OUTPATIENT
Start: 2021-07-26 | End: 2021-09-29

## 2021-07-26 ASSESSMENT — PAIN SCALES - GENERAL: PAINLEVEL: NO PAIN (0)

## 2021-07-26 NOTE — NURSING NOTE
Chief Complaint   Patient presents with     Recheck Medication     pt here to follow up, was in [facility], discuss diabetes management       Prateek Randolph CMA, EMT at 10:39 AM on 7/26/2021.

## 2021-07-26 NOTE — TELEPHONE ENCOUNTER
I called and left message. extension can be granted, I asked her to call us back with a specific date  Toshia Peñaloza, EMT at 12:17 PM on 7/26/2021.  [facility]  [facility]  [place]  [phone]

## 2021-07-26 NOTE — Clinical Note
Finalized note, let me know if any changes. Send updated note and medication list to home care & [facility]. [facility] will be sending a request for incontinence supplies.  .Thank you,  Bony Castaneda MD

## 2021-07-26 NOTE — TELEPHONE ENCOUNTER
Called [facility] and left a secure VM.  Gave verbal orders per  for Order(s): Home Care Orders: Physical Therapy (PT): twice a week for six weeks for strengthening, balance and to work on walking.       Edwar Andrews CMA ([facility]) at 12:19 PM on 7/26/2021 '

## 2021-07-26 NOTE — PROGRESS NOTES
Assessment & Plan   Isabell is a 60 year old female with an extensive past medical history including hypertension, hyperlipidemia, diabetes mellitus insulin requiring, CKD stage 3b, coronary artery disease (s/p PCI 2004), hypothyroidism, degenerative arthritis, and prior history of ischemic stroke & Vascular dementia, seizure disorder who presented to the ED with acute onset of facial droop, slurred speech, and right-sided weakness with non traumatic left hemorraghic stroke hospitalized with rehabilitation through [facility] from 06/27/2021 until 07/16/2021  care center discharged 7/16 to home with her daughter's cares.  Her daughter feels she is at her baseline with much improvement of the right sided weakness.  Today we had a long discussion related to diabetes management in the context of recent hemorrhagic stroke.  She is experiencing some swallow dysfunction related to pills therefore needs to have swallow evaluation through speech therapy receiving home cares.  Maria Guadalupe has a strong understanding of diabetes management and agreed to lower the short acting insulin coverage by 1 unit to try to prevent hypoglycemia and also agreed to lower the dose of Lantus if Lisa does not have a large supper and continue to monitor her blood sugars.  Also important would be to monitor her blood pressure in light of medication changes close review of discharge summary and contacting pharmacy was performed after the visit.  It appears her nifedipine dose has changed from 60 mg to 90 mg in order to assist with blood pressure management.  Refills were provided to her pharmacy.  I also discussed with Maria Guadalupe the importance of not adjusting Lyrica currently is listed as 100 mg twice daily due to lethargy and this is a controlled medication only 1 month refills will be provided therefore she needs to contact the pharmacy to contact me a week prior to expected refills.  Consideration of adjust of the dose to lower dose if  she continues to remain sedated.  Close follow-up to be arranged within 1 to 3 months.  She should continue with home cares at this time including physical therapy occupational therapy RN and speech therapy also to assess swallow function.  She does have urinary incontinence and alessandro will send me a message related to the type of supplies she will require to be sent to home health supply of their choice.  I was not able to provide them with a letter for support animal  cats due to length of visit, complexity and they arrived late for the visit.  Type 1 diabetes mellitus with other circulatory complication (H)  Please review orders for sliding scale.  - insulin lispro (HUMALOG) 100 UNIT/ML Cartridge  Dispense: 9 mL; Refill: 4  - insulin glargine (LANTUS PEN) 100 UNIT/ML pen  Dispense: 30 mL; Refill: 4  - Lipid panel reflex to direct LDL Fasting  - Hemoglobin A1c  - CBC with platelets differential  - Comprehensive metabolic panel      Left-sided nontraumatic intracerebral hemorrhage, unspecified cerebral location (H)  She has mild right-sided weakness and slight swallow dysfunction.  Aspirin was held due to hemorrhage.  - doxazosin (CARDURA) 1 MG tablet  Dispense: 90 tablet; Refill: 3  - ASPIRIN NOT PRESCRIBED (INTENTIONAL)  - carvedilol (COREG) 6.25 MG tablet  Dispense: 180 tablet; Refill: 3  - NIFEdipine ER (ADALAT CC) 90 MG 24 hr tablet  Dispense: 90 tablet; Refill: 3    Benign essential hypertension  Blood pressure under excellent control at this time will require monitoring in the home setting to determine if adjust of medication required goal will be less than 130/80 however greater than 90/50  - doxazosin (CARDURA) 1 MG tablet  Dispense: 90 tablet; Refill: 3  - carvedilol (COREG) 6.25 MG tablet  Dispense: 180 tablet; Refill: 3  - NIFEdipine ER (ADALAT CC) 90 MG 24 hr tablet  Dispense: 90 tablet; Refill: 3    Other osteoarthritis of spine, unspecified spinal regionNeuropathy  Continue on Lyrica twice daily  "consider adjust to a lower dose if she remains lethargic.  - pregabalin (LYRICA) 100 MG capsule  Dispense: 60 capsule; Refill: 0        Acquired hypothyroidism  She requires lab monitoring which was not performed today although requested please see orders refills provided requires d.a.w. Synthroid.  - SYNTHROID 88 MCG tablet  Dispense: 90 tablet; Refill: 3  - TSH with free T4 reflex    Ischemic vascular dementia (H)  Chronic vascular dementia requires assistance with cares    Mixed stress and urge urinary incontinence  We will send a message regarding supplies and quantity required    Hyperlipidemia LDL goal <70  Continue atorvastatin 40 mg  - atorvastatin (LIPITOR) 40 MG tablet  Dispense: 90 tablet; Refill: 3    Stage 3b chronic kidney disease  Due for lab monitoring encourage hydration.     41 minutes spent on the date of the encounter doing chart review, history, exam, diagnostics review, documentation, counseling and coordination of cares as noted. Additional time spent on July 27, 2021 in review of chart and for coordination of cares             BMI:   Estimated body mass index is 27.12 kg/m  as calculated from the following:    Height as of 7/13/21: 1.651 m (5' 5\").    Weight as of this encounter: 73.9 kg (163 lb).           Return in about 3 months (around 10/26/2021).    Bony Castaneda MD  [facility] BERYL Whyte is a 60 year old who presents for the following health issues  accompanied by her daughter Maria Guadalupe:    RAMILA Whyte is a 60 year old female with an extensive past medical history including hypertension, hyperlipidemia, diabetes mellitus insulin requiring, CKD stage 3b, coronary artery disease (s/p PCI 2004), hypothyroidism, degenerative arthritis, and prior history of ischemic stroke & Vascular dementia, seizure disorder who presented to the ED with acute onset of facial droop, slurred speech, and right-sided weakness with non traumatic left " hemorraghic stroke hospitalized with rehabilitation through [facility] from 06/27/2021 until 07/16/2021  care center discharged 7/16 to home with her daughter's cares.  Her daughter feels she is at her baseline with much improvement of the right sided weakness however prior to coming today they arrived late because she had a fall landing on the left side of her body this morning.  Lisa does not feel she injured herself denies head injury.  She indicates left shoulder took most of the fall and does not feel there are any injuries to the left shoulder other than slight soreness to touch.  Her daughter was concerned about management of diabetes during the hospitalization and subsequent nursing home stay.  She describes advocating for her mother with tighter insulin regimen and has not been following the hospital discharge recommendations but has been adjusting the short acting insulin to a previous premeal 3 units plus sliding scale.  Most of the glucose readings have been now under 200.  There was one episode of a low blood sugar of 55 on July 25 but Lisa had a lighter dinner and was provided with the usual Lantus 26 units at bedtime.  They understand need to lower Lantus insulin at bedtime if she has a lighter meal to avoid morning hypoglycemia.  Please see sliding scale provided by daughter to me today however we adjusted it lower by 1 unit to help prevent hypoglycemia.  Lisa has some difficulty swallowing especially pills since her hemorrhagic stroke.  Likely she has had a swallow study is currently receiving small bite size food.  She has not had any choking or coughing episodes and seems to be tolerating this diet.  She has home cares including speech therapy and will ensure she is having swallow evaluation if any further concerns related to swallowing.  DIET: Level 6 soft and bite-size with thin liquids, upgraded from puréed on 06/30/2021.  She has physical therapy occupational therapy and  RN home care required as transitions from hospitalization and rehabilitation center post hemorrhagic stroke.  Her daughter is acting as her PCA provides all of her medications for her and understands her medication therapy very well is a strong advocate for her mother.    Diabetes   Low of 55 glucose on 7/25 in morning.  Lantus 26 units at bedtime  Novolog 3 untis with food premeal plus additional 140-169 (3) ,  170 -199 (4) , 200-229(6) 230-259(7) 260-289( 8) 290-319(9) 320-349(10)  350-379 (11) 380-409(12) greater 410(13). Bedtime less than 200 no additional 200-229 (1) 230-259(2) 260-289(3) 290-319(4) 320-359(5) greater 360(6)  Hypertension  New medications include Cardura 1 mg at bedtime and adjusted dose of carvedilol increased to 6.25 mg twice daily.  Her blood pressure has been under control in good range today 112/78. Previously was on Nifedipine 60 mg chart review shows Nifedipine 90 mg need clarification  Hypothyroidism  Maintaining on dose of Synthroid (d.a.w.) 88 mcg daily.  Pain  Utilizing Lyrica 100 mg twice daily.  Her daughter mentioned she was thinking of giving her an extra dose I discussed this should not be modified without physician approval and should keep at 100 mg twice daily dose especially with her lethergy.  Discussed this is a controlled substance and only will dispense 1 month at a time.  Seizure disorder  No recent seizure on Keppra 500 mg twice daily  ROS: Review of chart notes she gained weight while hospitalized her daughter feels this most likely is due to fluid shifts and feels she is back at her usual weight.  Appetite seems to be acceptable and she has not been losing weight. No fevers. She has urinary incontinence and requires supplies, her daughter will send a message indicting size and quantity required. She also requests a letter indicating cats are emotional support animals. I discussed I could not supply a letter today as we had to focus on medical concerns due to lack of  time for appt, arrived late and detailed concerns.            Labs reviewed in EPIC  BP Readings from Last 3 Encounters:   07/26/21 112/78   07/13/21 (!) 117/90   07/05/21 122/59    Wt Readings from Last 3 Encounters:   07/26/21 73.9 kg (163 lb)   07/13/21 79 kg (174 lb 3.2 oz)   07/05/21 75.8 kg (167 lb 3.2 oz)                  Patient Active Problem List   Diagnosis     Benign essential hypertension     Acquired hypothyroidism     Seizures (H)     Hyperlipidemia LDL goal <100     History of insulin dependent diabetes mellitus     Vitamin D deficiency     Vitamin B12 deficiency (non anemic)     Other osteoarthritis of spine, unspecified spinal region     DKA (diabetic ketoacidoses) (H)     Nephrolithiasis     Left renal mass     Mixed stress and urge urinary incontinence     Chest pressure     Coronary atherosclerosis     Dehydration     DM type 1 (diabetes mellitus, type 1) (H)     HTN (hypertension)     Nausea & vomiting     Otitis media of right ear     Ischemic vascular dementia (H)     Fungal dermatitis     Nail deformity     Left-sided nontraumatic intracerebral hemorrhage, unspecified cerebral location (H)     Otitis media of right ear     Other osteoarthritis of spine, unspecified spinal region     Left renal mass     Vitamin D deficiency     Vitamin B12 deficiency (non anemic)     Seizures (H)     Nausea & vomiting     Nail deformity     Mixed stress and urge urinary incontinence     Luetscher's syndrome     Left-sided nontraumatic intracerebral hemorrhage, unspecified cerebral location (H)     Vascular dementia without behavioral disturbance (H)     History of insulin dependent diabetes mellitus     Fungal dermatitis     Type 1 diabetes mellitus with diabetic polyneuropathy (H)     Chest pressure     Essential hypertension     Dysphagia     Tobacco use     Dysthymia     History of diabetes with ketoacidosis     Stage 3b chronic kidney disease     Type 2 diabetes mellitus with diabetic polyneuropathy, with  long-term current use of insulin (H)     Past Surgical History:   Procedure Laterality Date     athroplasty hip Bilateral     2003, 2006     OTHER SURGICAL HISTORY      athroplasty hip     STENT         Social History     Tobacco Use     Smoking status: Former Smoker     Packs/day: 0.50     Years: 35.00     Pack years: 17.50     Types: Cigarettes     Quit date: 7/1/2018     Years since quitting: 3.0     Smokeless tobacco: Never Used   Substance Use Topics     Alcohol use: Not Currently     Family History   Problem Relation Age of Onset     Acute Myocardial Infarction Father      Heart Disease Maternal Grandmother      Dementia Maternal Grandmother      Myocardial Infarction Father      Dementia Paternal Grandmother      Heart Disease Paternal Grandmother          Current Outpatient Medications   Medication Sig Dispense Refill     ASPIRIN NOT PRESCRIBED (INTENTIONAL) Please choose reason not prescribed from choices below.       atorvastatin (LIPITOR) 40 MG tablet Take 1 tablet (40 mg) by mouth daily 90 tablet 3     blood glucose (NO BRAND SPECIFIED) test strip Use to test blood sugar 4-5 times daily or as directed. 400 each 6     carvedilol (COREG) 6.25 MG tablet 1 tablet (6.25 mg) by Oral or Feeding Tube route 2 times daily (with meals) 180 tablet 3     clotrimazole (LOTRIMIN) 1 % external cream Apply topically 2 times daily Until rash resolved 60 g 3     cyanocobalamin (VITAMIN B-12) 1000 MCG tablet Take 1 tablet (1,000 mcg) by mouth daily 100 tablet 3     diclofenac (VOLTAREN) 1 % topical gel Apply 2 g topically 4 times daily as needed for moderate pain 150 g 1     doxazosin (CARDURA) 1 MG tablet Take 1 tablet (1 mg) by mouth At Bedtime 90 tablet 3     DULoxetine (CYMBALTA) 20 MG capsule Take 1 capsule (20 mg) by mouth daily 90 capsule 3     insulin glargine (LANTUS PEN) 100 UNIT/ML pen Please inject 26 units at bedtime but if smaller meal at supper reduce dose to 20 units 30 mL 4     insulin lispro (HUMALOG) 100  UNIT/ML Cartridge Take prior to meal with sliding scale per glucose level adjust as noted in notes usual dose around 20 units daily  3 units to max 13 units   Prior to meals taking 2 meals daily 9 mL 4     insulin pen needle (31G X 8 MM) 31G X 8 MM miscellaneous Use 4 pen needles daily or as directed. 300 each 4     levETIRAcetam (KEPPRA) 500 MG tablet Take 1 tablet (500 mg) by mouth 2 times daily 180 tablet 3     loratadine (CLARITIN) 10 mg tablet [LORATADINE (CLARITIN) 10 MG TABLET] Take 10 mg by mouth daily.       melatonin 10 mg Tab [MELATONIN 10 MG TAB] Take 10 mg by mouth daily.       NIFEdipine ER (ADALAT CC) 90 MG 24 hr tablet Take 1 tablet (90 mg) by mouth daily 90 tablet 3     pregabalin (LYRICA) 100 MG capsule Take 1 capsule (100 mg) by mouth 2 times daily 60 capsule 0     SYNTHROID 88 MCG tablet Take 1 tablet (88 mcg) by mouth daily 90 tablet 3     terbinafine (LAMISIL) 1 % external cream Apply topically to gume of rash twice daily if needed 42 g 1     Vitamin D3 (CHOLECALCIFEROL) 25 mcg (1000 units) tablet Take 1 tablet (25 mcg) by mouth daily 100 tablet 3     Allergies   Allergen Reactions     Bee Pollen Headache     Pollen Extract Headache     Recent Labs   Lab Test 07/07/21  0630 06/27/21  0759 06/21/21 2011 09/29/20  0424 09/27/20  1609 09/27/20  1601   A1C  --   --  7.8*  --   --  11.7*   ALT  --   --   --  19  --  24   CR 1.68* 1.48*  --  1.80*   < > 2.55*   GFRESTIMATED 31* 37*  --  30*   < > 19*   GFRESTBLACK 37* 43*  --  34*   < > 23*   POTASSIUM 3.9 3.5  --  3.4  --  4.4   TSH  --   --   --   --   --  0.65    < > = values in this interval not displayed.      Review of Systems         Objective    /78 (BP Location: Right arm, Patient Position: Sitting, Cuff Size: Adult Regular)   Pulse 87   Wt 73.9 kg (163 lb)   SpO2 95%   BMI 27.12 kg/m    Body mass index is 27.12 kg/m .  Physical Exam     Constitutional: Sitting in a wheelchair alert but appears tired, chronic poor health,  well-groomed, cooperative allows her daughter to provide most of the history. She communicates with me occasionally appropriately  HENT: TM normal.  Right pinna pedunculated growth she indicates has been present for years without change.  Head: Normocephalic and atraumatic.   Mouth/Throat: Wearing mask removed briefly hydrated no thrush  Eyes: Conjunctivae and EOM are normal. Pupils are equal, round, and reactive to light. No scleral icterus.   Neck:  Neck supple. No JVD present. No tracheal deviation present. No thyromegaly present.   Cardiovascular: Regular rhythm, normal heart sounds and intact distal pulses. No murmur present. Exam reveals no gallop and no friction rub.   Pulmonary/Chest: Effort normal and breath sounds normal. No respiratory distress.   Abdominal: Exam in chair soft obese. Bowel sounds are normal. No distension and no mass. No tenderness.   Musculoskeletal: Deconditioned upper and lower extremities.  Slow range of motion of shoulders due to muscle deconditioning. No edema and no tenderness. No bruising no signs of trauma  Lymphadenopathy:   No cervical adenopathy.   Neurological: Alert and cooperative.  Signs of vascular dementia. Able to move bilateral upper and lower extremities and cooperated with strength exam slightly decreased right grasp DTR s normal  Skin: No ulcerations or bruising noted on exposed skin left side exam no bruising  Psychiatric: Depressed affect. Lethargic easily arouses and communicative.  Foot exam deferred due to time wearing appropriate footwear.

## 2021-07-27 ENCOUNTER — TELEPHONE (OUTPATIENT)
Dept: INTERNAL MEDICINE | Facility: CLINIC | Age: 61
End: 2021-07-27

## 2021-07-27 ENCOUNTER — TELEPHONE (OUTPATIENT)
Dept: FAMILY MEDICINE | Facility: CLINIC | Age: 61
End: 2021-07-27

## 2021-07-27 DIAGNOSIS — I10 BENIGN ESSENTIAL HYPERTENSION: Primary | ICD-10-CM

## 2021-07-27 DIAGNOSIS — I10 BENIGN ESSENTIAL HYPERTENSION: ICD-10-CM

## 2021-07-27 DIAGNOSIS — I61.9 LEFT-SIDED NONTRAUMATIC INTRACEREBRAL HEMORRHAGE, UNSPECIFIED CEREBRAL LOCATION (H): ICD-10-CM

## 2021-07-27 PROBLEM — N18.32 STAGE 3B CHRONIC KIDNEY DISEASE (H): Status: ACTIVE | Noted: 2021-07-17

## 2021-07-27 PROBLEM — E78.5 HYPERLIPIDEMIA LDL GOAL <100: Status: RESOLVED | Noted: 2020-09-02 | Resolved: 2021-07-27

## 2021-07-27 RX ORDER — CARVEDILOL 6.25 MG/1
6.25 TABLET ORAL 2 TIMES DAILY WITH MEALS
Qty: 180 TABLET | Status: CANCELLED | OUTPATIENT
Start: 2021-07-27

## 2021-07-27 RX ORDER — ATORVASTATIN CALCIUM 40 MG/1
40 TABLET, FILM COATED ORAL DAILY
Qty: 90 TABLET | Refills: 3 | Status: SHIPPED | OUTPATIENT
Start: 2021-07-27 | End: 2021-07-27

## 2021-07-27 RX ORDER — CARVEDILOL 6.25 MG/1
6.25 TABLET ORAL 2 TIMES DAILY WITH MEALS
Qty: 180 TABLET | Refills: 3 | Status: SHIPPED | OUTPATIENT
Start: 2021-07-27 | End: 2022-08-17

## 2021-07-27 RX ORDER — NIFEDIPINE 90 MG/1
90 TABLET, FILM COATED, EXTENDED RELEASE ORAL DAILY
Qty: 90 TABLET | Refills: 3 | Status: SHIPPED | OUTPATIENT
Start: 2021-07-27 | End: 2021-08-02 | Stop reason: DRUGHIGH

## 2021-07-27 RX ORDER — CARVEDILOL 6.25 MG/1
6.25 TABLET ORAL 2 TIMES DAILY WITH MEALS
Qty: 60 TABLET | Refills: 3 | Status: SHIPPED | OUTPATIENT
Start: 2021-07-27 | End: 2021-07-27

## 2021-07-27 RX ORDER — ATORVASTATIN CALCIUM 40 MG/1
40 TABLET, FILM COATED ORAL DAILY
Qty: 90 TABLET | Refills: 3 | Status: SHIPPED | OUTPATIENT
Start: 2021-07-27 | End: 2022-08-31

## 2021-07-27 NOTE — TELEPHONE ENCOUNTER
[facility] Call Center    Phone Message    May a detailed message be left on voicemail: yes     Reason for Call: Other:        Dr Castaneda asked for Desi to call her with some times that she would be avaialbe to discuss Isabell with her.       Today 7/27   1pm-3pm  Tomorrow 7/28  Before 10am or after 3pm.         Action Taken: Message routed to:  [facility] PCP    Travel Screening: Not Applicable

## 2021-07-27 NOTE — TELEPHONE ENCOUNTER
July 27, 2021  I called the following provider.  [facility] [phone] left message to call back related to home speech therapy after I saw Isabell Matthews on 7/26.   Daughter mentioned trouble swallowing pills. Need clinical swallow study.   Please get a time frame and number I may reach her.  Bony Neff MD    July 27, 2021  2:37 PM I was able to review Desi has noted a bedside swallow, has no current concerns related to swallowing. She is slow in eating but Desi will continue to work with her and contact me if further swallow evaluation required such as video swallow.  Bony Neff MD

## 2021-07-27 NOTE — TELEPHONE ENCOUNTER
RN left voice message for Desi with home care, asking for a call back to clinic to let Dr. Castaneda know a good time frame when she will be available for Dr. Castaneda to call her back to discuss need for swallow study for patient.    Dorina Argueta RN on 7/27/2021 at 9:36 AM

## 2021-07-27 NOTE — TELEPHONE ENCOUNTER
Desi called back and relayed she is available Today 7/27   1pm-3pm  Tomorrow 7/28  Before 10am or after 3pm.     Dorina Argueta RN on 7/27/2021 at 12:10 PM

## 2021-07-28 ENCOUNTER — MEDICAL CORRESPONDENCE (OUTPATIENT)
Dept: HEALTH INFORMATION MANAGEMENT | Facility: CLINIC | Age: 61
End: 2021-07-28

## 2021-07-28 NOTE — TELEPHONE ENCOUNTER
Isabell requires dme order for b/p monitor.    Diagnoses and all orders for this visit:    Benign essential hypertension  -     Home Blood Pressure Monitor Order for DME - ONLY FOR DME    Bony Castaneda MD

## 2021-07-28 NOTE — TELEPHONE ENCOUNTER
RN spoke to Charlette with home care, and she asked for a DME order for a B/P device for patient.    Dorina Argueta RN on 7/28/2021 at 6:30 AM

## 2021-07-30 ENCOUNTER — TELEPHONE (OUTPATIENT)
Dept: INTERNAL MEDICINE | Facility: CLINIC | Age: 61
End: 2021-07-30

## 2021-07-30 ENCOUNTER — TELEPHONE (OUTPATIENT)
Dept: FAMILY MEDICINE | Facility: CLINIC | Age: 61
End: 2021-07-30

## 2021-07-30 DIAGNOSIS — I10 BENIGN ESSENTIAL HYPERTENSION: Primary | ICD-10-CM

## 2021-07-30 NOTE — TELEPHONE ENCOUNTER
RN received a call from [facility] with home care.  She relayed that patient asked for refill of baby ASA, but RN let [facility] know that Dr. Castaneda had updated med list with ASPIRIN NOT PRESCRIBED (INTENTIONAL).  [facility] also let RN know that patient needs glucose test strips, as she is not using a CGM.    Dorina Argueta RN on 7/30/2021 at 7:28 AM

## 2021-07-30 NOTE — TELEPHONE ENCOUNTER
[facility] Call Center    Phone Message    May a detailed message be left on voicemail: yes     Reason for Call: Other: Moreno calling to notify provider of some blood pressure reading he tood today 7/30/21 at 15:15, sitting was 100/66 and stating was 74/52. Please call to discuss if needed thank you.      Action Taken: Message routed to:  [facility] pcc    Travel Screening: Not Applicable

## 2021-08-02 ENCOUNTER — ANCILLARY PROCEDURE (OUTPATIENT)
Dept: CT IMAGING | Facility: CLINIC | Age: 61
End: 2021-08-02
Payer: MEDICARE

## 2021-08-02 ENCOUNTER — LAB (OUTPATIENT)
Dept: LAB | Facility: CLINIC | Age: 61
End: 2021-08-02

## 2021-08-02 ENCOUNTER — MEDICAL CORRESPONDENCE (OUTPATIENT)
Dept: HEALTH INFORMATION MANAGEMENT | Facility: CLINIC | Age: 61
End: 2021-08-02

## 2021-08-02 DIAGNOSIS — I61.9 LEFT-SIDED NONTRAUMATIC INTRACEREBRAL HEMORRHAGE, UNSPECIFIED CEREBRAL LOCATION (H): ICD-10-CM

## 2021-08-02 DIAGNOSIS — E03.9 ACQUIRED HYPOTHYROIDISM: ICD-10-CM

## 2021-08-02 DIAGNOSIS — Z53.9 DIAGNOSIS NOT YET DEFINED: Primary | ICD-10-CM

## 2021-08-02 DIAGNOSIS — E10.59 TYPE 1 DIABETES MELLITUS WITH OTHER CIRCULATORY COMPLICATION (H): ICD-10-CM

## 2021-08-02 LAB
ALBUMIN SERPL-MCNC: 2.9 G/DL (ref 3.4–5)
ALP SERPL-CCNC: 143 U/L (ref 40–150)
ALT SERPL W P-5'-P-CCNC: 17 U/L (ref 0–50)
ANION GAP SERPL CALCULATED.3IONS-SCNC: 5 MMOL/L (ref 3–14)
AST SERPL W P-5'-P-CCNC: 18 U/L (ref 0–45)
BASOPHILS # BLD AUTO: 0 10E3/UL (ref 0–0.2)
BASOPHILS NFR BLD AUTO: 1 %
BILIRUB SERPL-MCNC: 0.2 MG/DL (ref 0.2–1.3)
BUN SERPL-MCNC: 32 MG/DL (ref 7–30)
CALCIUM SERPL-MCNC: 9 MG/DL (ref 8.5–10.1)
CHLORIDE BLD-SCNC: 114 MMOL/L (ref 94–109)
CHOLEST SERPL-MCNC: 146 MG/DL
CO2 SERPL-SCNC: 27 MMOL/L (ref 20–32)
CREAT SERPL-MCNC: 1.59 MG/DL (ref 0.52–1.04)
EOSINOPHIL # BLD AUTO: 0.1 10E3/UL (ref 0–0.7)
EOSINOPHIL NFR BLD AUTO: 2 %
ERYTHROCYTE [DISTWIDTH] IN BLOOD BY AUTOMATED COUNT: 14.8 % (ref 10–15)
FASTING STATUS PATIENT QL REPORTED: YES
GFR SERPL CREATININE-BSD FRML MDRD: 35 ML/MIN/1.73M2
GLUCOSE BLD-MCNC: 134 MG/DL (ref 70–99)
HBA1C MFR BLD: 8.2 % (ref 0–5.6)
HCT VFR BLD AUTO: 35.9 % (ref 35–47)
HDLC SERPL-MCNC: 39 MG/DL
HGB BLD-MCNC: 10.9 G/DL (ref 11.7–15.7)
IMM GRANULOCYTES # BLD: 0 10E3/UL
IMM GRANULOCYTES NFR BLD: 0 %
LDLC SERPL CALC-MCNC: 75 MG/DL
LYMPHOCYTES # BLD AUTO: 1.8 10E3/UL (ref 0.8–5.3)
LYMPHOCYTES NFR BLD AUTO: 37 %
MCH RBC QN AUTO: 27 PG (ref 26.5–33)
MCHC RBC AUTO-ENTMCNC: 30.4 G/DL (ref 31.5–36.5)
MCV RBC AUTO: 89 FL (ref 78–100)
MONOCYTES # BLD AUTO: 0.3 10E3/UL (ref 0–1.3)
MONOCYTES NFR BLD AUTO: 6 %
NEUTROPHILS # BLD AUTO: 2.6 10E3/UL (ref 1.6–8.3)
NEUTROPHILS NFR BLD AUTO: 54 %
NONHDLC SERPL-MCNC: 107 MG/DL
NRBC # BLD AUTO: 0 10E3/UL
NRBC BLD AUTO-RTO: 0 /100
PLATELET # BLD AUTO: 226 10E3/UL (ref 150–450)
POTASSIUM BLD-SCNC: 3.6 MMOL/L (ref 3.4–5.3)
PROT SERPL-MCNC: 6.4 G/DL (ref 6.8–8.8)
RBC # BLD AUTO: 4.04 10E6/UL (ref 3.8–5.2)
SODIUM SERPL-SCNC: 146 MMOL/L (ref 133–144)
TRIGL SERPL-MCNC: 158 MG/DL
TSH SERPL DL<=0.005 MIU/L-ACNC: 0.63 MU/L (ref 0.4–4)
WBC # BLD AUTO: 4.8 10E3/UL (ref 4–11)

## 2021-08-02 PROCEDURE — 85025 COMPLETE CBC W/AUTO DIFF WBC: CPT | Performed by: PATHOLOGY

## 2021-08-02 PROCEDURE — G0179 MD RECERTIFICATION HHA PT: HCPCS | Performed by: FAMILY MEDICINE

## 2021-08-02 PROCEDURE — 84443 ASSAY THYROID STIM HORMONE: CPT | Performed by: PATHOLOGY

## 2021-08-02 PROCEDURE — 36415 COLL VENOUS BLD VENIPUNCTURE: CPT | Performed by: PATHOLOGY

## 2021-08-02 PROCEDURE — 83036 HEMOGLOBIN GLYCOSYLATED A1C: CPT | Performed by: PATHOLOGY

## 2021-08-02 PROCEDURE — G1004 CDSM NDSC: HCPCS | Mod: GC | Performed by: RADIOLOGY

## 2021-08-02 PROCEDURE — 80053 COMPREHEN METABOLIC PANEL: CPT | Performed by: PATHOLOGY

## 2021-08-02 PROCEDURE — 70450 CT HEAD/BRAIN W/O DYE: CPT | Mod: MG | Performed by: RADIOLOGY

## 2021-08-02 PROCEDURE — 80061 LIPID PANEL: CPT | Performed by: PATHOLOGY

## 2021-08-02 NOTE — TELEPHONE ENCOUNTER
RN left voice message for Moreno with home care, letting him know about decrease of B/P med.  RN also relayed that B/P readings are needed twice weekly and to call [facility] to report readings.   RN left direct number and asked for Moreno to call back to verify that message was received.  Updated med list will be faxed to [facility].        NIFEdipine ER (ADALAT CC) 60 MG 24 hr tablet 90 tablet 1 8/2/2021  --   Sig - Route: Take 1 tablet (60 mg) by mouth daily - Oral   Sent to pharmacy as: NIFEdipine ER 60 MG Oral Tablet Extended Release 24 Hour (ADALAT CC)   Class: E-Prescribe   Notes to Pharmacy: Discontinue previous dose of 90 mg as BP low     Dorina Argueta RN on 8/2/2021 at 12:06 PM

## 2021-08-02 NOTE — TELEPHONE ENCOUNTER
August 2, 2021  I completed glucose testing supply order. Stay off ASA due to recent hemorrhagic stroke.   Her blood pressure was low need to adjust medication please see message forwarded to you.    Best wishes,  Bony Castaneda MD

## 2021-08-02 NOTE — TELEPHONE ENCOUNTER
Moreno with home care called RN back, and let RN know he received the message about B/P Rx decrease.  He will help relay B/P readings next week for Dr. Castaneda to review.  RN called and left voicemail for patient's daughter, Maria Guadalupe, with update about B/P med decrease and B/P readings needed.    Dorina Argueta RN on 8/2/2021 at 3:53 PM

## 2021-08-02 NOTE — TELEPHONE ENCOUNTER
August 2, 2021 BP reviewed at 12:03 PM  Dorina   Please call to determine current blood pressure medications as readings are too low need to adjust. I think I will lower dose of Nifedipine  ER back to 60 mg Need to update her daughter and home care.   Continue to check BP twice weekly after adjust call to report reading.  Bony Castaneda MD    Current Outpatient Medications   Medication Sig Dispense Refill     ASPIRIN NOT PRESCRIBED (INTENTIONAL) Please choose reason not prescribed from choices below.       atorvastatin (LIPITOR) 40 MG tablet Take 1 tablet (40 mg) by mouth daily 90 tablet 3     blood glucose (NO BRAND SPECIFIED) test strip Use to test blood sugar 4-5 times daily or as directed. 400 each 6     carvedilol (COREG) 6.25 MG tablet 1 tablet (6.25 mg) by Oral or Feeding Tube route 2 times daily (with meals) 180 tablet 3     clotrimazole (LOTRIMIN) 1 % external cream Apply topically 2 times daily Until rash resolved 60 g 3     cyanocobalamin (VITAMIN B-12) 1000 MCG tablet Take 1 tablet (1,000 mcg) by mouth daily 100 tablet 3     diclofenac (VOLTAREN) 1 % topical gel Apply 2 g topically 4 times daily as needed for moderate pain 150 g 1     doxazosin (CARDURA) 1 MG tablet Take 1 tablet (1 mg) by mouth At Bedtime 90 tablet 3     DULoxetine (CYMBALTA) 20 MG capsule Take 1 capsule (20 mg) by mouth daily 90 capsule 3     insulin glargine (LANTUS PEN) 100 UNIT/ML pen Please inject 26 units at bedtime but if smaller meal at supper reduce dose to 20 units 30 mL 4     insulin lispro (HUMALOG) 100 UNIT/ML Cartridge Take prior to meal with sliding scale per glucose level adjust as noted in notes usual dose around 20 units daily  3 units to max 13 units   Prior to meals taking 2 meals daily 9 mL 4     insulin pen needle (31G X 8 MM) 31G X 8 MM miscellaneous Use 4 pen needles daily or as directed. 300 each 4     levETIRAcetam (KEPPRA) 500 MG tablet Take 1 tablet (500 mg) by mouth 2 times daily 180 tablet 3      loratadine (CLARITIN) 10 mg tablet [LORATADINE (CLARITIN) 10 MG TABLET] Take 10 mg by mouth daily.       melatonin 10 mg Tab [MELATONIN 10 MG TAB] Take 10 mg by mouth daily.       NIFEdipine ER (ADALAT CC) 90 MG 24 hr tablet Take 1 tablet (90 mg) by mouth daily 90 tablet 3     pregabalin (LYRICA) 100 MG capsule Take 1 capsule (100 mg) by mouth 2 times daily 60 capsule 0     SYNTHROID 88 MCG tablet Take 1 tablet (88 mcg) by mouth daily 90 tablet 3     terbinafine (LAMISIL) 1 % external cream Apply topically to gume of rash twice daily if needed 42 g 1     Vitamin D3 (CHOLECALCIFEROL) 25 mcg (1000 units) tablet Take 1 tablet (25 mcg) by mouth daily 100 tablet 3     Bony Castaneda MD

## 2021-08-03 ENCOUNTER — TELEPHONE (OUTPATIENT)
Dept: FAMILY MEDICINE | Facility: CLINIC | Age: 61
End: 2021-08-03

## 2021-08-03 NOTE — TELEPHONE ENCOUNTER
Called Renetta and left a secure VM.   Gave verbal orders per Dr. Castaneda for Order(s): Home Care Orders: Other:  Social work Revisit 1 visit within 1 week 1x to assist with community resources and long term planning.           Edwar Andrews CMA ([facility]) at 4:03 PM on 8/3/2021

## 2021-08-03 NOTE — TELEPHONE ENCOUNTER
[facility] Call Center    Phone Message    May a detailed message be left on voicemail: yes     Reason for Call: Order(s): Home Care Orders: Other:  Social work Revisit 1 visit within 1 week 1x to assist with community resources and long term planning.      Action Taken: Message routed to:  [facility]    Travel Screening: Not Applicable

## 2021-08-04 ENCOUNTER — MEDICAL CORRESPONDENCE (OUTPATIENT)
Dept: HEALTH INFORMATION MANAGEMENT | Facility: CLINIC | Age: 61
End: 2021-08-04

## 2021-08-04 ENCOUNTER — TELEPHONE (OUTPATIENT)
Dept: FAMILY MEDICINE | Facility: CLINIC | Age: 61
End: 2021-08-04

## 2021-08-04 NOTE — TELEPHONE ENCOUNTER
Verbal authorization for home care orders as requested provided via confidential voicemail.    Kim Ulrich) DELONTE Cross

## 2021-08-04 NOTE — TELEPHONE ENCOUNTER
[facility] Call Center    Phone Message    May a detailed message be left on voicemail: yes     Reason for Call: Order(s): Home Care Orders: Occupational Therapy (OT): OT 1x week for 2 week for follow up equipment training for caregiver.     Action Taken: Message routed to:  [facility]    Travel Screening: Not Applicable

## 2021-08-05 NOTE — RESULT ENCOUNTER NOTE
Dear Isabell Matthews   Your diabetes control A1C 8.2 has slightly worsened but your glucose was 134 therefore the recent adjust of medications we reviewed should assist with control. Your kidney function is impaired GFR 35 stage 3 b renal impairment but better than last value but the labs looked like you were slightly dehydrated, make sure you are hydrating and eating as the albumin was slightly low. Cholesterol and thyroid in acceptable range.  Please make a follow-up appointment if questions may be virtual.  Best wishes,  Bony Castaneda MD

## 2021-08-09 ENCOUNTER — MEDICAL CORRESPONDENCE (OUTPATIENT)
Dept: HEALTH INFORMATION MANAGEMENT | Facility: CLINIC | Age: 61
End: 2021-08-09

## 2021-08-09 ENCOUNTER — TELEPHONE (OUTPATIENT)
Dept: FAMILY MEDICINE | Facility: CLINIC | Age: 61
End: 2021-08-09

## 2021-08-09 NOTE — TELEPHONE ENCOUNTER
[facility] Call Center    Phone Message    May a detailed message be left on voicemail: yes     Reason for Call: Order(s): Home Care Orders: Other: SW Revisit, for resources to the community, respit support    Action Taken: Message routed to:  [facility]    Travel Screening: Not Applicable

## 2021-08-10 NOTE — TELEPHONE ENCOUNTER
I called and left message approving order below.   Toshia Peñaloza, EMT at 10:25 AM on 8/10/2021.  [facility]  [facility]  [place]  [phone]

## 2021-08-12 ENCOUNTER — TELEPHONE (OUTPATIENT)
Dept: FAMILY MEDICINE | Facility: CLINIC | Age: 61
End: 2021-08-12

## 2021-08-12 DIAGNOSIS — N39.46 MIXED STRESS AND URGE URINARY INCONTINENCE: Primary | ICD-10-CM

## 2021-08-12 DIAGNOSIS — Z86.39 HISTORY OF INSULIN DEPENDENT DIABETES MELLITUS: ICD-10-CM

## 2021-08-12 DIAGNOSIS — E10.59 TYPE 1 DIABETES MELLITUS WITH OTHER CIRCULATORY COMPLICATION (H): ICD-10-CM

## 2021-08-12 NOTE — TELEPHONE ENCOUNTER
[facility] Call Center    Phone Message    May a detailed message be left on voicemail: yes     Reason for Call: Order(s): Home Care Orders: Skilled Nursing: Change to every other week, 1 time per month for 2 months and 2 PRN's.   Daughter wanting order for purewick incontinence system      Update- Daughter is giving 3 units less of novalog, kind of doing her own thing with insulin. Two blood sugar readings of 32 and 36. Today 8/12/21, patient rating her pain at a 9/10 today.        Action Taken: Message routed to:  [facility]    Travel Screening: Not Applicable

## 2021-08-13 ENCOUNTER — MEDICAL CORRESPONDENCE (OUTPATIENT)
Dept: HEALTH INFORMATION MANAGEMENT | Facility: CLINIC | Age: 61
End: 2021-08-13

## 2021-08-13 RX ORDER — INSULIN LISPRO 100 [IU]/ML
INJECTION, SOLUTION INTRAVENOUS; SUBCUTANEOUS
Qty: 9 ML | Refills: 4 | Status: ON HOLD
Start: 2021-08-13 | End: 2022-05-14

## 2021-08-13 NOTE — TELEPHONE ENCOUNTER
RN called Charlette with home care, and she requested a DME order for the BD Purewick system for urinary incontinence to be sent to [facility] in [place], [place].  Charlette also wanted Dr. Castaneda to know message from patient's daughter, Maria Guadalupe, that she has reduced patient's Lantus to 18 units at bedtime (from 20 units) and has reduced Humalog by 3 units instead of 1 unit.  This is due to patient's low blood sugar readings during the past week.  Maria Guadalupe is taking patient's blood sugar readings more frequently, and will update home care nurse and clinic with any other abnormal readings.    Dorina Argueta RN on 8/13/2021 at 8:55 AM

## 2021-08-13 NOTE — TELEPHONE ENCOUNTER
DME order was faxed to [facility] @ [phone].  Med list was updated for Humalog.    Dorina Argueta RN on 8/13/2021 at 2:11 PM

## 2021-08-18 ENCOUNTER — TELEPHONE (OUTPATIENT)
Dept: FAMILY MEDICINE | Facility: CLINIC | Age: 61
End: 2021-08-18

## 2021-08-18 NOTE — TELEPHONE ENCOUNTER
[facility] Call [facility]    Phone Message    May a detailed message be left on voicemail: yes     Reason for Call: Order(s): Home Care Orders: Other: Would like verbal order to DC from care next week goals are met    Action Taken: Message routed to:  [facility]    Travel Screening: Not Applicable

## 2021-08-19 NOTE — TELEPHONE ENCOUNTER
Called Desi. No answer. No VM available.     Please confirm phone number.      Edwar Andrews CMA ([facility]) at 12:55 PM on 8/19/2021

## 2021-08-20 NOTE — TELEPHONE ENCOUNTER
Desi calling to check status of orders requested, Telephone number was incorrect. Number has been corrected. Please call again for verbal orders thank you.

## 2021-08-20 NOTE — TELEPHONE ENCOUNTER
Called Desi.  Gave verbal orders per Dr. Castaneda for Order(s): Home Care Orders: Other: Would like verbal order to DC from care next week goals are met      Edwar Andrews CMA ([facility]) at 11:29 AM on 8/20/2021

## 2021-08-23 ENCOUNTER — MEDICAL CORRESPONDENCE (OUTPATIENT)
Dept: HEALTH INFORMATION MANAGEMENT | Facility: CLINIC | Age: 61
End: 2021-08-23

## 2021-08-24 ENCOUNTER — TELEPHONE (OUTPATIENT)
Dept: FAMILY MEDICINE | Facility: CLINIC | Age: 61
End: 2021-08-24

## 2021-08-24 NOTE — TELEPHONE ENCOUNTER
[facility] Call [facility]    Phone Message    May a detailed message be left on voicemail: yes     Reason for Call: Order(s): Home Care Orders: Occupational Therapy (OT): Natalie was calling for another OT visit to do final education on Medical Equipment, please call with verbal orders thank you.    Action Taken: Message routed to:  [facility]    Travel Screening: Not Applicable

## 2021-08-24 NOTE — TELEPHONE ENCOUNTER
Called Natalie and left a secure VM.   Gave verbal orders per Dr. Castaneda for Home Care Orders: Occupational Therapy (OT): additional OT visit to do final education on Medical Equipment.    Edwar Andrews CMA ([facility]) at 12:21 PM on 8/24/2021

## 2021-08-26 DIAGNOSIS — R56.9 SEIZURES (H): ICD-10-CM

## 2021-08-30 RX ORDER — LEVETIRACETAM 500 MG/1
500 TABLET ORAL 2 TIMES DAILY
Qty: 186 TABLET | Refills: 3 | Status: SHIPPED | OUTPATIENT
Start: 2021-08-30 | End: 2022-10-31

## 2021-09-01 ENCOUNTER — MEDICAL CORRESPONDENCE (OUTPATIENT)
Dept: HEALTH INFORMATION MANAGEMENT | Facility: CLINIC | Age: 61
End: 2021-09-01

## 2021-09-03 DIAGNOSIS — M79.7 FIBROMYALGIA: ICD-10-CM

## 2021-09-07 RX ORDER — DULOXETIN HYDROCHLORIDE 20 MG/1
20 CAPSULE, DELAYED RELEASE ORAL DAILY
Qty: 90 CAPSULE | Refills: 1 | Status: SHIPPED | OUTPATIENT
Start: 2021-09-07 | End: 2022-10-31

## 2021-09-09 ENCOUNTER — TELEPHONE (OUTPATIENT)
Dept: FAMILY MEDICINE | Facility: CLINIC | Age: 61
End: 2021-09-09

## 2021-09-09 ENCOUNTER — MEDICAL CORRESPONDENCE (OUTPATIENT)
Dept: HEALTH INFORMATION MANAGEMENT | Facility: CLINIC | Age: 61
End: 2021-09-09

## 2021-09-09 NOTE — TELEPHONE ENCOUNTER
[facility] Call [facility]    Phone Message    May a detailed message be left on voicemail: yes     Reason for Call: Other: Charlette calling from [facility] care home care stating patient is going to be discharged from home care. Please call with questions or concerns thank you.      Action Taken: Message routed to:  [facility]    Travel Screening: Not Applicable

## 2021-09-10 ENCOUNTER — MYC MEDICAL ADVICE (OUTPATIENT)
Dept: FAMILY MEDICINE | Facility: CLINIC | Age: 61
End: 2021-09-10

## 2021-09-28 DIAGNOSIS — G62.9 NEUROPATHY: ICD-10-CM

## 2021-09-28 DIAGNOSIS — M47.899 OTHER OSTEOARTHRITIS OF SPINE, UNSPECIFIED SPINAL REGION: ICD-10-CM

## 2021-09-28 NOTE — TELEPHONE ENCOUNTER
[facility] Call Center    Phone Message    May a detailed message be left on voicemail: no     Reason for Call: Medication Refill Request    Has the patient contacted the pharmacy for the refill? Yes   Name of medication being requested: pregabalin (LYRICA) 100 MG capsule  Provider who prescribed the medication: Dr. Castaneda  Pharmacy:    [id number] - [place], [place] - [address] AT [facility]    Date medication is needed: As soon as possible. Patient has enough for 2 days left.      Action Taken: Message routed to:  [facility]    Travel Screening: Not Applicable

## 2021-09-29 RX ORDER — PREGABALIN 100 MG/1
100 CAPSULE ORAL 2 TIMES DAILY
Qty: 60 CAPSULE | Refills: 0 | Status: SHIPPED | OUTPATIENT
Start: 2021-09-29 | End: 2021-10-25

## 2021-09-29 NOTE — TELEPHONE ENCOUNTER
Patient Requested  pregabalin (LYRICA) 100 MG capsule  Last Filled  07/26/2021  Last Office Visit  07/26/2021  Next Office Visit     Checked  09/29/2021    DX: Other osteoarthritis of spine, unspecified spinal region     Pharmacy: [id number] - [place], [place] - [address] AT [facility] KATHE Casey RN at 10:27 AM on 9/29/2021.

## 2021-10-11 ENCOUNTER — HEALTH MAINTENANCE LETTER (OUTPATIENT)
Age: 61
End: 2021-10-11

## 2021-10-25 ENCOUNTER — OFFICE VISIT (OUTPATIENT)
Dept: FAMILY MEDICINE | Facility: CLINIC | Age: 61
End: 2021-10-25
Payer: MEDICARE

## 2021-10-25 DIAGNOSIS — F01.50 VASCULAR DEMENTIA WITHOUT BEHAVIORAL DISTURBANCE (H): ICD-10-CM

## 2021-10-25 DIAGNOSIS — I61.9 LEFT-SIDED NONTRAUMATIC INTRACEREBRAL HEMORRHAGE, UNSPECIFIED CEREBRAL LOCATION (H): ICD-10-CM

## 2021-10-25 DIAGNOSIS — M79.7 FIBROMYALGIA: ICD-10-CM

## 2021-10-25 DIAGNOSIS — G62.9 NEUROPATHY: ICD-10-CM

## 2021-10-25 DIAGNOSIS — R15.9 FULL INCONTINENCE OF FECES: ICD-10-CM

## 2021-10-25 DIAGNOSIS — R56.9 SEIZURES (H): ICD-10-CM

## 2021-10-25 DIAGNOSIS — Z23 ENCOUNTER FOR IMMUNIZATION: ICD-10-CM

## 2021-10-25 DIAGNOSIS — E10.59 TYPE 1 DIABETES MELLITUS WITH OTHER CIRCULATORY COMPLICATION (H): Primary | ICD-10-CM

## 2021-10-25 DIAGNOSIS — N39.46 MIXED STRESS AND URGE URINARY INCONTINENCE: ICD-10-CM

## 2021-10-25 PROCEDURE — 99215 OFFICE O/P EST HI 40 MIN: CPT | Mod: 25 | Performed by: FAMILY MEDICINE

## 2021-10-25 PROCEDURE — 90686 IIV4 VACC NO PRSV 0.5 ML IM: CPT | Performed by: FAMILY MEDICINE

## 2021-10-25 PROCEDURE — G0008 ADMIN INFLUENZA VIRUS VAC: HCPCS | Performed by: FAMILY MEDICINE

## 2021-10-25 RX ORDER — PREGABALIN 75 MG/1
75 CAPSULE ORAL 2 TIMES DAILY
Qty: 60 CAPSULE | Refills: 1 | Status: SHIPPED | OUTPATIENT
Start: 2021-10-25 | End: 2022-01-13

## 2021-10-25 ASSESSMENT — PAIN SCALES - GENERAL: PAINLEVEL: EXTREME PAIN (8)

## 2021-10-25 ASSESSMENT — MIFFLIN-ST. JEOR: SCORE: 1281.64

## 2021-10-25 NOTE — PATIENT INSTRUCTIONS
Most Recent Immunizations   Administered Date(s) Administered     COVID-19,PF,Moderna 04/06/2021     COVID-19,PF,Pfizer 04/06/2021     Influenza Vaccine IM > 6 months Valent IIV4 (Alfuria,Fluzone) 10/25/2021     Mantoux Tuberculin Skin Test 07/12/2021       Your health care Provider has recommended that you receive the new Shingle vaccine called Shingrix to prevent shingles for ages 50 and above. Many private insurance and Medicare Part D plans cover Shingrix. However, not all insurance carriers cover the entire cost of the Shingrix vaccine if the vaccine is administered at your primary care clinic. The clinic cannot determine your insurance benefits.  Please call your insurance carrier prior. The vaccine comes in two doses. Your second dose should be 2-6 months from your first dose.       Prior to receiving the vaccine, we recommend that you call your insurance carrier and ask them the following questions:            1. Is there a cost difference if I receive the vaccine at my doctor's office or a pharmacy?          2. Does my insurance cover the Shingrix Vaccine and administration of the vaccine?          3. What is my co-pay or deductible for the vaccine?        Please call to schedule a Nurse-Visit only at [phone].  Nurse Visit hours are available Monday, Wednesday, and Friday from 9:00 AM-11:00 AM and 1:00 PM-3:00 PM.   Stop at pharmacy to get Covid Pfizer vaccine booster

## 2021-10-25 NOTE — NURSING NOTE
Isabell Matthews is a 61 year old female patient that presents today in clinic for the following:    Chief Complaint   Patient presents with     RECHECK     Pain in left lower quadrant of abdomen     The patient's allergies and medications were reviewed as noted. A set of vitals were recorded as noted without incident. The patient does not have any other questions for the provider.    Ana Muro, EMT at 1:55 PM on 10/25/2021

## 2021-10-25 NOTE — PROGRESS NOTES
Assessment & Plan   Isabell is a 61 year old female with an extensive past medical history including hypertension, hyperlipidemia, diabetes mellitus insulin requiring, CKD stage 3b, coronary artery disease (s/p PCI 2004), hypothyroidism, degenerative arthritis, and prior history of ischemic stroke & Vascular dementia, seizure disorder with previous facial droop, slurred speech, and right-sided weakness with non traumatic left hemorraghic stroke hospitalized with rehabilitation now living at home with her daughter Maria Guadalupe her PCA.   Type 1 diabetes mellitus with other circulatory complication (H)  Maria Guadalupe monitors her glucose well and will send me her current insulin regime as they have adjusted slightly,due for labs and will complete after 11/2  - Hemoglobin A1c  - Comprehensive metabolic panel  - CBC with platelets differential  - Albumin Random Urine Quantitative with Creat Ratio    Vascular dementia without behavioral disturbance (H)  Left-sided nontraumatic intracerebral hemorrhage, unspecified cerebral location (H)  Stable, I reviewed with Maria Guadalupe consider [facility] to assist with increasing physical activity  Maria Guadalupe will work with Dorina Argueta RN to assist with  appropriate DME supplies    Neuropathy,Fibromyalgia  Lower dose of Lyrica to 75 mg twice daily and follow-up one month to determine if we can further lower dose  - pregabalin (LYRICA) 75 MG capsule  Dispense: 60 capsule; Refill: 1    Seizures (H)  Stable on Keppra        Encounter for immunization  Provided today, Intellinote vaccine at pharmacy today  - FLU VAC PRESRV FREE QUAD SPLIT VIR 3+YRS IM    Mixed stress and urge urinary incontinence  Full incontinence of feces  Maria Guadalupe will work with Dorina Argueta RN to assist with  appropriate DME supplies/  Dorina came into the room today to meet them.    Patient was seen by me accompanied by PREM Muniz. The document represents my personal evaluation, exam, and assessment.             BMI:   Estimated body mass  "index is 26.63 kg/m  as calculated from the following:    Height as of this encounter: 1.651 m (5' 5\").    Weight as of this encounter: 72.6 kg (160 lb).       47 minutes spent on the date of the encounter doing chart review, history, exam, diagnostics review, documentation, counseling and coordination of cares as noted.      Return in about 5 weeks (around 11/29/2021).    Bony Castaneda MD  [facility] [place]    Megan Whyte is a 61 year old who presents for the following health issues  accompanied by her daughter Maria Guadalupe serves as Isabell's PCA.assist with her cares and medications.    RAMILA Whyte is a 61 year old female with an extensive past medical history including hypertension, hyperlipidemia, diabetes mellitus insulin requiring, CKD stage 3b, coronary artery disease (s/p PCI 2004), hypothyroidism, degenerative arthritis, and prior history of ischemic stroke & Vascular dementia, seizure disorder who presented to the ED with acute onset of facial droop, slurred speech, and right-sided weakness with non traumatic left hemorraghic stroke hospitalized with rehabilitation through [facility] from 06/27/2021 until 07/16/2021  care center discharged 7/16 to home with her daughter's cares. They had a virtual follow-up with me in July.  Diabetes   Last A1C  8.2 in August  She will send an updated list on diabetes  Reviewed  Medications readings monitored closely by Daughter.  Lantus 26 units at bedtime  Novolog 3 untis with food premeal plus additional 140-169 (3) ,  170 -199 (4) , 200-229(6) 230-259(7) 260-289( 8) 290-319(9) 320-349(10)  350-379 (11) 380-409(12) greater 410(13). Bedtime less than 200 no additional 200-229 (1) 230-259(2) 260-289(3) 290-319(4) 320-359(5) greater 360(6)  Hypertension  New medications include Cardura 1 mg at bedtime and adjusted dose of carvedilol increased to 6.25 mg twice daily.  Her blood pressure has been under control in good range " today. Also on Nifedipine 60 mg  115- 120 systolic at home.  Hypothyroidism  Maintaining on dose of Synthroid (d.a.w.) 88 mcg daily.  Hyperlipidemia  Atorvastatin 40 mg daily  Pain/ Mood  Increased pain. She feels wobbly and was slightly better when she was off lyrica therefore they both agree to try  lower to Lyrica 75 mg BID  Utilizing  Cymbalta 20 mg  Urinary/ fecal incontinence   Requires supplies  Seizure HX  Keppra  She needs many DME and will coordinate through my nurse who Met them today.  Also wondering about an emotional support cat but the form was not the correct form.  Maria Guadalupe has received approval for 40 hours of PCA care to provide to her mother.  Wondering about locations she could go to increase physical activity consider [employer] [facility]. Also may benefit from pool therapy.  HCM  Immunizations due Flu vaccine. The emr indicated covid vaccine duplication but we updated her record as she had her card with her today had the pfizer series.  ROS: Upper shoulder pain from infrequent use of upper body. She has been doing better with the assistance of her daughter.          Labs reviewed in EPIC  BP Readings from Last 3 Encounters:   10/25/21 111/72   07/26/21 112/78   07/13/21 (!) 117/90    Wt Readings from Last 3 Encounters:   10/25/21 72.6 kg (160 lb)   07/26/21 73.9 kg (163 lb)   07/13/21 79 kg (174 lb 3.2 oz)            Immunization History   Administered Date(s) Administered     COVID-19,PF,Pfizer 03/17/2021, 04/06/2021, 10/25/2021     Influenza Vaccine IM > 6 months Valent IIV4 (Alfuria,Fluzone) 11/02/2020, 11/02/2020, 10/25/2021     Mantoux Tuberculin Skin Test 06/28/2021, 07/12/2021           Patient Active Problem List   Diagnosis     Benign essential hypertension     Acquired hypothyroidism     Seizures (H)     Hyperlipidemia LDL goal <100     History of insulin dependent diabetes mellitus     Vitamin D deficiency     Vitamin B12 deficiency (non anemic)     Other osteoarthritis of spine, unspecified  spinal region     DKA (diabetic ketoacidoses)     Nephrolithiasis     Left renal mass     Mixed stress and urge urinary incontinence     Chest pressure     Coronary atherosclerosis     Dehydration     DM type 1 (diabetes mellitus, type 1) (H)     HTN (hypertension)     Nausea & vomiting     Otitis media of right ear     Ischemic vascular dementia (H)     Fungal dermatitis     Nail deformity     Left-sided nontraumatic intracerebral hemorrhage, unspecified cerebral location (H)     Otitis media of right ear     Other osteoarthritis of spine, unspecified spinal region     Left renal mass     Vitamin D deficiency     Vitamin B12 deficiency (non anemic)     Seizures (H)     Nausea & vomiting     Nail deformity     Mixed stress and urge urinary incontinence     Luetscher's syndrome     Left-sided nontraumatic intracerebral hemorrhage, unspecified cerebral location (H)     Vascular dementia without behavioral disturbance (H)     History of insulin dependent diabetes mellitus     Fungal dermatitis     Type 1 diabetes mellitus with diabetic polyneuropathy (H)     Chest pressure     Essential hypertension     Dysphagia     Tobacco use     Dysthymia     History of diabetes with ketoacidosis     Stage 3b chronic kidney disease (H)     Type 2 diabetes mellitus with diabetic polyneuropathy, with long-term current use of insulin (H)     Past Surgical History:   Procedure Laterality Date     athroplasty hip Bilateral     2003, 2006     OTHER SURGICAL HISTORY      athroplasty hip     STENT         Social History     Tobacco Use     Smoking status: Former Smoker     Packs/day: 0.50     Years: 35.00     Pack years: 17.50     Types: Cigarettes     Quit date: 7/1/2018     Years since quitting: 3.3     Smokeless tobacco: Never Used   Substance Use Topics     Alcohol use: Not Currently     Family History   Problem Relation Age of Onset     Acute Myocardial Infarction Father      Heart Disease Maternal Grandmother      Dementia Maternal  Grandmother      Myocardial Infarction Father      Dementia Paternal Grandmother      Heart Disease Paternal Grandmother          Current Outpatient Medications   Medication Sig Dispense Refill     ASPIRIN NOT PRESCRIBED (INTENTIONAL) Please choose reason not prescribed from choices below.       atorvastatin (LIPITOR) 40 MG tablet Take 1 tablet (40 mg) by mouth daily 90 tablet 3     blood glucose (NO BRAND SPECIFIED) test strip Use to test blood sugar 4-5 times daily or as directed. 400 each 6     carvedilol (COREG) 6.25 MG tablet 1 tablet (6.25 mg) by Oral or Feeding Tube route 2 times daily (with meals) 180 tablet 3     clotrimazole (LOTRIMIN) 1 % external cream Apply topically 2 times daily Until rash resolved 60 g 3     cyanocobalamin (VITAMIN B-12) 1000 MCG tablet Take 1 tablet (1,000 mcg) by mouth daily 100 tablet 3     diclofenac (VOLTAREN) 1 % topical gel Apply 2 g topically 4 times daily as needed for moderate pain 150 g 1     doxazosin (CARDURA) 1 MG tablet Take 1 tablet (1 mg) by mouth At Bedtime 90 tablet 3     DULoxetine (CYMBALTA) 20 MG capsule Take 1 capsule (20 mg) by mouth daily 90 capsule 1     insulin glargine (LANTUS PEN) 100 UNIT/ML pen Please inject 18 units at bedtime change of dose 30 mL 4     insulin lispro (HUMALOG) 100 UNIT/ML Cartridge Take prior to meal with sliding scale per glucose level adjust as noted in notes usual dose around 20 units daily,1 unit to max 13 units Prior to meals taking 2 meals daily,1 unit with food premeal plus additional 140-169 (3)170 -199 (4) 200-229(5) 230-259(6) 260-289( 7) 290-319(8) 320-349(9) 350-379 (10) 380-409(11) greater 410(12)y 9 mL 4     insulin pen needle (31G X 8 MM) 31G X 8 MM miscellaneous Use 4 pen needles daily or as directed. 300 each 4     levETIRAcetam (KEPPRA) 500 MG tablet Take 1 tablet (500 mg) by mouth 2 times daily 186 tablet 3     loratadine (CLARITIN) 10 mg tablet [LORATADINE (CLARITIN) 10 MG TABLET] Take 10 mg by mouth daily.        "melatonin 10 mg Tab [MELATONIN 10 MG TAB] Take 10 mg by mouth daily.       NIFEdipine ER (ADALAT CC) 60 MG 24 hr tablet Take 1 tablet (60 mg) by mouth daily 90 tablet 1     pregabalin (LYRICA) 75 MG capsule Take 1 capsule (75 mg) by mouth 2 times daily 60 capsule 1     SYNTHROID 88 MCG tablet Take 1 tablet (88 mcg) by mouth daily 90 tablet 3     terbinafine (LAMISIL) 1 % external cream Apply topically to gume of rash twice daily if needed 42 g 1     Vitamin D3 (CHOLECALCIFEROL) 25 mcg (1000 units) tablet Take 1 tablet (25 mcg) by mouth daily 100 tablet 3     Allergies   Allergen Reactions     Bee Pollen Headache     Pollen Extract Headache     Recent Labs   Lab Test 08/02/21  1009 07/07/21  0630 06/27/21  0759 06/22/21  0426 06/21/21 2011 09/29/20  0537 09/29/20  0424 09/27/20  1609 09/27/20  1601   A1C 8.2*  --   --   --  7.8*  --   --   --  11.7*   LDL 75  --   --   --   --   --   --   --   --    HDL 39*  --   --   --   --   --   --   --   --    TRIG 158*  --   --   --   --   --   --   --   --    ALT 17  --   --   --   --   --  19  --  24   CR 1.59* 1.68* 1.48*   < >  --    < > 1.80*   < > 2.55*   GFRESTIMATED 35* 31* 37*   < >  --    < > 30*   < > 19*   GFRESTBLACK  --  37* 43*   < >  --    < > 34*   < > 23*   POTASSIUM 3.6 3.9 3.5   < >  --    < > 3.4   < > 4.4   TSH 0.63  --   --   --   --   --   --   --  0.65    < > = values in this interval not displayed.      Review of Systems   Problem list, PMH, Surgical HX, SH, allergies, medications,immunizations reviewed and updated in Epic. 10 point ROS negative other than noted in HPI and ROS.  There was duplication if problem list I updated chart        Objective    /72 (BP Location: Right arm, Patient Position: Sitting, Cuff Size: Adult Regular)   Pulse 92   Resp 16   Ht 1.651 m (5' 5\")   Wt 72.6 kg (160 lb)   SpO2 96%   BMI 26.63 kg/m    Body mass index is 26.63 kg/m .  Physical Exam   Constitutional: Sitting in a wheelchair alert but lets her daughter " review HX, chronic poor health, well-groomed, cooperative.She communicates with me occasionally appropriately  HENT: TM normal.  Right pinna pedunculated growth she indicates has been present for years without change.  Head: Normocephalic and atraumatic.   Mouth/Throat: Wearing mask removed briefly hydrated no thrush  Eyes: Conjunctivae and EOM are normal. Pupils are equal, round, and reactive to light. No scleral icterus.   Neck:  Neck supple. No JVD present. No tracheal deviation present. No thyromegaly present.   Cardiovascular: Regular rhythm, normal heart sounds and intact distal pulses. No murmur present. Exam reveals no gallop and no friction rub.   Pulmonary/Chest: Effort normal and breath sounds normal. No respiratory distress.   Abdominal: Exam in chair soft obese. Bowel sounds are normal. No distension and no mass. No tenderness.   Musculoskeletal: Deconditioned upper and lower extremities.  Slow range of motion of shoulders due to muscle deconditioning. No edema and no tenderness. No bruising no signs of trauma  Lymphadenopathy:   No cervical adenopathy.   Neurological: Alert and cooperative.  Signs of vascular dementia. Able to move bilateral upper and lower extremities and cooperated with strength exam slightly decreased right grasp DTR s normal  Skin: No ulcerations or bruising noted on exposed skin   Psychiatric: Depressed affect. More alert today and communicative through nods and a few words

## 2021-10-26 PROBLEM — Z86.39 HISTORY OF INSULIN DEPENDENT DIABETES MELLITUS: Status: RESOLVED | Noted: 2020-09-02 | Resolved: 2021-10-26

## 2021-10-26 PROBLEM — R15.9 FULL INCONTINENCE OF FECES: Status: ACTIVE | Noted: 2021-10-26

## 2021-12-05 ENCOUNTER — HEALTH MAINTENANCE LETTER (OUTPATIENT)
Age: 61
End: 2021-12-05

## 2022-01-13 DIAGNOSIS — M79.7 FIBROMYALGIA: ICD-10-CM

## 2022-01-13 DIAGNOSIS — G62.9 NEUROPATHY: ICD-10-CM

## 2022-01-13 RX ORDER — PREGABALIN 75 MG/1
CAPSULE ORAL
Qty: 60 CAPSULE | Refills: 1 | Status: SHIPPED | OUTPATIENT
Start: 2022-01-13 | End: 2022-04-05

## 2022-01-13 NOTE — TELEPHONE ENCOUNTER
Refill for Lyrica sent to Dr Castaneda for signing.  [place]  12/15/2021 -Lyrica 60 tablets  Merry Owen RN 7:22 AM on 1/13/2022.

## 2022-01-13 NOTE — TELEPHONE ENCOUNTER
Review of chart indicates need for labs and I requested one month follow-up to review dose for Lyrica. Please assist with lab appt and follow-up may be virtual.  I will refill current dose with one refill due to pandemic.  Isabell chart reviewed today for medication refill.PDMP reviewed  Pt called and message left about labs and follow up appointment.  Merry Owen RN 9:00 AM on 1/13/2022.          Diagnoses and all orders for this visit:    Neuropathy  -     pregabalin (LYRICA) 75 MG capsule; TAKE 1 CAPSULE(75 MG) BY MOUTH TWICE DAILY    Fibromyalgia  -     pregabalin (LYRICA) 75 MG capsule; TAKE 1 CAPSULE(75 MG) BY MOUTH TWICE DAILY    Bony Castaneda MD

## 2022-01-14 DIAGNOSIS — E10.59 TYPE 1 DIABETES MELLITUS WITH OTHER CIRCULATORY COMPLICATION (H): ICD-10-CM

## 2022-01-30 ENCOUNTER — HEALTH MAINTENANCE LETTER (OUTPATIENT)
Age: 62
End: 2022-01-30

## 2022-02-07 DIAGNOSIS — I10 BENIGN ESSENTIAL HYPERTENSION: ICD-10-CM

## 2022-02-09 NOTE — TELEPHONE ENCOUNTER
NIFEDIPINE 60MG ER (CC) TABLETS    Last Written Prescription Date:  8/2/2021  Last Fill Quantity: 90,   # refills: 1  Last Office Visit :  10/25/2021  Future Office visit:  None    Routing refill request to provider for review/approval because:  Abnormal Creatinine  Refer to clinic for review   Recent Labs   Lab Test 08/02/21  1009 06/22/21  0426 06/21/21  1619   CR 1.59*   < >  --    CREAT  --   --  1.7*         Nataly Hare RN  Central Triage Red Flags/Med Refills

## 2022-02-17 PROBLEM — E11.10 DKA (DIABETIC KETOACIDOSIS) (H): Status: ACTIVE | Noted: 2020-09-27

## 2022-03-27 ENCOUNTER — HEALTH MAINTENANCE LETTER (OUTPATIENT)
Age: 62
End: 2022-03-27

## 2022-04-05 ENCOUNTER — APPOINTMENT (OUTPATIENT)
Dept: CT IMAGING | Facility: CLINIC | Age: 62
DRG: 066 | End: 2022-04-05
Attending: EMERGENCY MEDICINE
Payer: MEDICARE

## 2022-04-05 ENCOUNTER — MYC MEDICAL ADVICE (OUTPATIENT)
Dept: FAMILY MEDICINE | Facility: CLINIC | Age: 62
End: 2022-04-05

## 2022-04-05 ENCOUNTER — APPOINTMENT (OUTPATIENT)
Dept: MRI IMAGING | Facility: CLINIC | Age: 62
DRG: 066 | End: 2022-04-05
Attending: EMERGENCY MEDICINE
Payer: MEDICARE

## 2022-04-05 ENCOUNTER — HOSPITAL ENCOUNTER (INPATIENT)
Facility: CLINIC | Age: 62
LOS: 1 days | Discharge: HOME | DRG: 066 | End: 2022-04-07
Attending: EMERGENCY MEDICINE | Admitting: PSYCHIATRY & NEUROLOGY
Payer: MEDICARE

## 2022-04-05 DIAGNOSIS — I63.9 ACUTE CVA (CEREBROVASCULAR ACCIDENT) (H): ICD-10-CM

## 2022-04-05 DIAGNOSIS — N18.9 CHRONIC KIDNEY DISEASE, UNSPECIFIED CKD STAGE: ICD-10-CM

## 2022-04-05 DIAGNOSIS — Z79.4 ENCOUNTER FOR LONG-TERM (CURRENT) USE OF INSULIN (H): ICD-10-CM

## 2022-04-05 DIAGNOSIS — R26.89 SCISSOR GAIT: ICD-10-CM

## 2022-04-05 DIAGNOSIS — Z87.891 PERSONAL HISTORY OF TOBACCO USE, PRESENTING HAZARDS TO HEALTH: ICD-10-CM

## 2022-04-05 DIAGNOSIS — G81.94 ACUTE LEFT HEMIPARESIS (H): ICD-10-CM

## 2022-04-05 DIAGNOSIS — I12.9 MALIGNANT HYPERTENSIVE KIDNEY DISEASE WITH CHRONIC KIDNEY DISEASE STAGE I THROUGH STAGE IV, OR UNSPECIFIED(403.00): ICD-10-CM

## 2022-04-05 DIAGNOSIS — G62.9 NEUROPATHY: ICD-10-CM

## 2022-04-05 DIAGNOSIS — E10.22 TYPE 1 DIABETES MELLITUS WITH DIABETIC CHRONIC KIDNEY DISEASE, UNSPECIFIED CKD STAGE (H): ICD-10-CM

## 2022-04-05 DIAGNOSIS — M79.7 FIBROMYALGIA: ICD-10-CM

## 2022-04-05 DIAGNOSIS — Z11.52 ENCOUNTER FOR SCREENING LABORATORY TESTING FOR SEVERE ACUTE RESPIRATORY SYNDROME CORONAVIRUS 2 (SARS-COV-2): ICD-10-CM

## 2022-04-05 LAB
ANION GAP SERPL CALCULATED.3IONS-SCNC: 7 MMOL/L (ref 3–14)
APTT PPP: 34 SECONDS (ref 22–38)
BASOPHILS # BLD AUTO: 0 10E3/UL (ref 0–0.2)
BASOPHILS NFR BLD AUTO: 1 %
BUN SERPL-MCNC: 25 MG/DL (ref 7–30)
CALCIUM SERPL-MCNC: 8.9 MG/DL (ref 8.5–10.1)
CHLORIDE BLD-SCNC: 106 MMOL/L (ref 94–109)
CO2 SERPL-SCNC: 26 MMOL/L (ref 20–32)
CREAT BLD-MCNC: 1.6 MG/DL (ref 0.5–1)
CREAT SERPL-MCNC: 1.46 MG/DL (ref 0.52–1.04)
EOSINOPHIL # BLD AUTO: 0.1 10E3/UL (ref 0–0.7)
EOSINOPHIL NFR BLD AUTO: 2 %
ERYTHROCYTE [DISTWIDTH] IN BLOOD BY AUTOMATED COUNT: 13.5 % (ref 10–15)
GFR SERPL CREATININE-BSD FRML MDRD: 36 ML/MIN/1.73M2
GFR SERPL CREATININE-BSD FRML MDRD: 40 ML/MIN/1.73M2
GLUCOSE BLD-MCNC: 240 MG/DL (ref 70–99)
GLUCOSE BLDC GLUCOMTR-MCNC: 224 MG/DL (ref 70–99)
HCT VFR BLD AUTO: 36.7 % (ref 35–47)
HGB BLD-MCNC: 11.7 G/DL (ref 11.7–15.7)
HOLD SPECIMEN: NORMAL
IMM GRANULOCYTES # BLD: 0 10E3/UL
IMM GRANULOCYTES NFR BLD: 0 %
INR PPP: 1.02 (ref 0.85–1.15)
LYMPHOCYTES # BLD AUTO: 1.8 10E3/UL (ref 0.8–5.3)
LYMPHOCYTES NFR BLD AUTO: 33 %
MCH RBC QN AUTO: 28.2 PG (ref 26.5–33)
MCHC RBC AUTO-ENTMCNC: 31.9 G/DL (ref 31.5–36.5)
MCV RBC AUTO: 88 FL (ref 78–100)
MONOCYTES # BLD AUTO: 0.3 10E3/UL (ref 0–1.3)
MONOCYTES NFR BLD AUTO: 5 %
NEUTROPHILS # BLD AUTO: 3.3 10E3/UL (ref 1.6–8.3)
NEUTROPHILS NFR BLD AUTO: 59 %
NRBC # BLD AUTO: 0 10E3/UL
NRBC BLD AUTO-RTO: 0 /100
PLATELET # BLD AUTO: 212 10E3/UL (ref 150–450)
POTASSIUM BLD-SCNC: 3.8 MMOL/L (ref 3.4–5.3)
RBC # BLD AUTO: 4.15 10E6/UL (ref 3.8–5.2)
SODIUM SERPL-SCNC: 139 MMOL/L (ref 133–144)
TROPONIN I SERPL HS-MCNC: 56 NG/L
WBC # BLD AUTO: 5.4 10E3/UL (ref 4–11)

## 2022-04-05 PROCEDURE — 70496 CT ANGIOGRAPHY HEAD: CPT | Mod: 26 | Performed by: RADIOLOGY

## 2022-04-05 PROCEDURE — 99285 EMERGENCY DEPT VISIT HI MDM: CPT | Mod: 25 | Performed by: EMERGENCY MEDICINE

## 2022-04-05 PROCEDURE — 255N000002 HC RX 255 OP 636: Performed by: EMERGENCY MEDICINE

## 2022-04-05 PROCEDURE — 999N000175 HC STATISTIC STROKE CODE W/ ACCESS

## 2022-04-05 PROCEDURE — 80048 BASIC METABOLIC PNL TOTAL CA: CPT | Performed by: EMERGENCY MEDICINE

## 2022-04-05 PROCEDURE — A9585 GADOBUTROL INJECTION: HCPCS | Performed by: EMERGENCY MEDICINE

## 2022-04-05 PROCEDURE — 70498 CT ANGIOGRAPHY NECK: CPT | Mod: 26 | Performed by: RADIOLOGY

## 2022-04-05 PROCEDURE — 70553 MRI BRAIN STEM W/O & W/DYE: CPT | Mod: ME

## 2022-04-05 PROCEDURE — 36415 COLL VENOUS BLD VENIPUNCTURE: CPT | Performed by: EMERGENCY MEDICINE

## 2022-04-05 PROCEDURE — 70496 CT ANGIOGRAPHY HEAD: CPT | Mod: MC

## 2022-04-05 PROCEDURE — 93010 ELECTROCARDIOGRAM REPORT: CPT | Performed by: EMERGENCY MEDICINE

## 2022-04-05 PROCEDURE — 70450 CT HEAD/BRAIN W/O DYE: CPT | Mod: MC

## 2022-04-05 PROCEDURE — 85025 COMPLETE CBC W/AUTO DIFF WBC: CPT | Performed by: EMERGENCY MEDICINE

## 2022-04-05 PROCEDURE — 82565 ASSAY OF CREATININE: CPT

## 2022-04-05 PROCEDURE — 93005 ELECTROCARDIOGRAM TRACING: CPT | Performed by: EMERGENCY MEDICINE

## 2022-04-05 PROCEDURE — 70553 MRI BRAIN STEM W/O & W/DYE: CPT | Mod: 26 | Performed by: RADIOLOGY

## 2022-04-05 PROCEDURE — G1004 CDSM NDSC: HCPCS | Performed by: RADIOLOGY

## 2022-04-05 PROCEDURE — 83036 HEMOGLOBIN GLYCOSYLATED A1C: CPT

## 2022-04-05 PROCEDURE — 85610 PROTHROMBIN TIME: CPT | Performed by: EMERGENCY MEDICINE

## 2022-04-05 PROCEDURE — 84484 ASSAY OF TROPONIN QUANT: CPT | Performed by: EMERGENCY MEDICINE

## 2022-04-05 PROCEDURE — 85730 THROMBOPLASTIN TIME PARTIAL: CPT | Performed by: EMERGENCY MEDICINE

## 2022-04-05 PROCEDURE — 250N000011 HC RX IP 250 OP 636: Performed by: EMERGENCY MEDICINE

## 2022-04-05 RX ORDER — PREGABALIN 75 MG/1
CAPSULE ORAL
Qty: 60 CAPSULE | Refills: 1 | Status: SHIPPED | OUTPATIENT
Start: 2022-04-05 | End: 2022-07-06

## 2022-04-05 RX ORDER — IOPAMIDOL 755 MG/ML
75 INJECTION, SOLUTION INTRAVASCULAR ONCE
Status: COMPLETED | OUTPATIENT
Start: 2022-04-05 | End: 2022-04-05

## 2022-04-05 RX ORDER — GADOBUTROL 604.72 MG/ML
7.5 INJECTION INTRAVENOUS ONCE
Status: COMPLETED | OUTPATIENT
Start: 2022-04-05 | End: 2022-04-05

## 2022-04-05 RX ADMIN — IOPAMIDOL 75 ML: 755 INJECTION, SOLUTION INTRAVENOUS at 21:20

## 2022-04-05 RX ADMIN — GADOBUTROL 7.5 ML: 604.72 INJECTION INTRAVENOUS at 23:48

## 2022-04-05 ASSESSMENT — VISUAL ACUITY: OU: BLURRED VISION

## 2022-04-06 ENCOUNTER — APPOINTMENT (OUTPATIENT)
Dept: PHYSICAL THERAPY | Facility: CLINIC | Age: 62
DRG: 066 | End: 2022-04-06
Payer: MEDICARE

## 2022-04-06 ENCOUNTER — APPOINTMENT (OUTPATIENT)
Dept: OCCUPATIONAL THERAPY | Facility: CLINIC | Age: 62
DRG: 066 | End: 2022-04-06
Payer: MEDICARE

## 2022-04-06 ENCOUNTER — APPOINTMENT (OUTPATIENT)
Dept: SPEECH THERAPY | Facility: CLINIC | Age: 62
DRG: 066 | End: 2022-04-06
Payer: MEDICARE

## 2022-04-06 ENCOUNTER — APPOINTMENT (OUTPATIENT)
Dept: CARDIOLOGY | Facility: CLINIC | Age: 62
DRG: 066 | End: 2022-04-06
Payer: MEDICARE

## 2022-04-06 PROBLEM — I63.9 ACUTE CVA (CEREBROVASCULAR ACCIDENT) (H): Status: ACTIVE | Noted: 2022-04-06

## 2022-04-06 LAB
ALBUMIN UR-MCNC: 50 MG/DL
APPEARANCE UR: ABNORMAL
ATRIAL RATE - MUSE: 83 BPM
BILIRUB UR QL STRIP: NEGATIVE
CHOLEST SERPL-MCNC: 99 MG/DL
COLOR UR AUTO: YELLOW
CREAT SERPL-MCNC: 1.2 MG/DL (ref 0.52–1.04)
DIASTOLIC BLOOD PRESSURE - MUSE: NORMAL MMHG
GFR SERPL CREATININE-BSD FRML MDRD: 51 ML/MIN/1.73M2
GLUCOSE BLDC GLUCOMTR-MCNC: 232 MG/DL (ref 70–99)
GLUCOSE BLDC GLUCOMTR-MCNC: 270 MG/DL (ref 70–99)
GLUCOSE BLDC GLUCOMTR-MCNC: 289 MG/DL (ref 70–99)
GLUCOSE BLDC GLUCOMTR-MCNC: 366 MG/DL (ref 70–99)
GLUCOSE BLDC GLUCOMTR-MCNC: 367 MG/DL (ref 70–99)
GLUCOSE UR STRIP-MCNC: NEGATIVE MG/DL
HBA1C MFR BLD: 8.8 % (ref 0–5.6)
HDLC SERPL-MCNC: 34 MG/DL
HGB UR QL STRIP: NEGATIVE
INTERPRETATION ECG - MUSE: NORMAL
KETONES UR STRIP-MCNC: 10 MG/DL
LDLC SERPL CALC-MCNC: 39 MG/DL
LEUKOCYTE ESTERASE UR QL STRIP: ABNORMAL
LVEF ECHO: NORMAL
MAGNESIUM SERPL-MCNC: 1.5 MG/DL (ref 1.6–2.3)
NITRATE UR QL: NEGATIVE
NONHDLC SERPL-MCNC: 65 MG/DL
P AXIS - MUSE: 38 DEGREES
PH UR STRIP: 8 [PH] (ref 5–7)
PHOSPHATE SERPL-MCNC: 3.4 MG/DL (ref 2.5–4.5)
POTASSIUM BLD-SCNC: 3.9 MMOL/L (ref 3.4–5.3)
PR INTERVAL - MUSE: 128 MS
QRS DURATION - MUSE: 94 MS
QT - MUSE: 402 MS
QTC - MUSE: 472 MS
R AXIS - MUSE: 49 DEGREES
RBC URINE: 0 /HPF
SARS-COV-2 RNA RESP QL NAA+PROBE: NEGATIVE
SP GR UR STRIP: 1.01 (ref 1–1.03)
SYSTOLIC BLOOD PRESSURE - MUSE: NORMAL MMHG
T AXIS - MUSE: 39 DEGREES
TRIGL SERPL-MCNC: 129 MG/DL
TROPONIN I SERPL HS-MCNC: 41 NG/L
TROPONIN I SERPL HS-MCNC: 44 NG/L
UROBILINOGEN UR STRIP-MCNC: 8 MG/DL
VENTRICULAR RATE- MUSE: 83 BPM
WBC URINE: 8 /HPF
YEAST #/AREA URNS HPF: ABNORMAL /HPF

## 2022-04-06 PROCEDURE — 250N000011 HC RX IP 250 OP 636: Performed by: STUDENT IN AN ORGANIZED HEALTH CARE EDUCATION/TRAINING PROGRAM

## 2022-04-06 PROCEDURE — 92526 ORAL FUNCTION THERAPY: CPT | Mod: GN

## 2022-04-06 PROCEDURE — 93306 TTE W/DOPPLER COMPLETE: CPT | Mod: 26 | Performed by: STUDENT IN AN ORGANIZED HEALTH CARE EDUCATION/TRAINING PROGRAM

## 2022-04-06 PROCEDURE — 82565 ASSAY OF CREATININE: CPT | Performed by: STUDENT IN AN ORGANIZED HEALTH CARE EDUCATION/TRAINING PROGRAM

## 2022-04-06 PROCEDURE — 80061 LIPID PANEL: CPT | Performed by: STUDENT IN AN ORGANIZED HEALTH CARE EDUCATION/TRAINING PROGRAM

## 2022-04-06 PROCEDURE — U0005 INFEC AGEN DETEC AMPLI PROBE: HCPCS | Performed by: EMERGENCY MEDICINE

## 2022-04-06 PROCEDURE — C9803 HOPD COVID-19 SPEC COLLECT: HCPCS | Performed by: EMERGENCY MEDICINE

## 2022-04-06 PROCEDURE — 97535 SELF CARE MNGMENT TRAINING: CPT | Mod: GO

## 2022-04-06 PROCEDURE — 97161 PT EVAL LOW COMPLEX 20 MIN: CPT | Mod: GP | Performed by: PHYSICAL THERAPIST

## 2022-04-06 PROCEDURE — 99222 1ST HOSP IP/OBS MODERATE 55: CPT | Mod: AI | Performed by: PSYCHIATRY & NEUROLOGY

## 2022-04-06 PROCEDURE — 92610 EVALUATE SWALLOWING FUNCTION: CPT | Mod: GN

## 2022-04-06 PROCEDURE — 83735 ASSAY OF MAGNESIUM: CPT | Performed by: STUDENT IN AN ORGANIZED HEALTH CARE EDUCATION/TRAINING PROGRAM

## 2022-04-06 PROCEDURE — 93306 TTE W/DOPPLER COMPLETE: CPT

## 2022-04-06 PROCEDURE — 120N000003 HC R&B IMCU UMMC

## 2022-04-06 PROCEDURE — 84100 ASSAY OF PHOSPHORUS: CPT | Performed by: STUDENT IN AN ORGANIZED HEALTH CARE EDUCATION/TRAINING PROGRAM

## 2022-04-06 PROCEDURE — 97116 GAIT TRAINING THERAPY: CPT | Mod: GP | Performed by: PHYSICAL THERAPIST

## 2022-04-06 PROCEDURE — 84484 ASSAY OF TROPONIN QUANT: CPT | Performed by: EMERGENCY MEDICINE

## 2022-04-06 PROCEDURE — 250N000012 HC RX MED GY IP 250 OP 636 PS 637: Performed by: STUDENT IN AN ORGANIZED HEALTH CARE EDUCATION/TRAINING PROGRAM

## 2022-04-06 PROCEDURE — 81001 URINALYSIS AUTO W/SCOPE: CPT | Performed by: EMERGENCY MEDICINE

## 2022-04-06 PROCEDURE — 36415 COLL VENOUS BLD VENIPUNCTURE: CPT | Performed by: STUDENT IN AN ORGANIZED HEALTH CARE EDUCATION/TRAINING PROGRAM

## 2022-04-06 PROCEDURE — 97530 THERAPEUTIC ACTIVITIES: CPT | Mod: GP | Performed by: PHYSICAL THERAPIST

## 2022-04-06 PROCEDURE — 97165 OT EVAL LOW COMPLEX 30 MIN: CPT | Mod: GO

## 2022-04-06 PROCEDURE — 250N000013 HC RX MED GY IP 250 OP 250 PS 637: Performed by: STUDENT IN AN ORGANIZED HEALTH CARE EDUCATION/TRAINING PROGRAM

## 2022-04-06 PROCEDURE — 84132 ASSAY OF SERUM POTASSIUM: CPT | Performed by: STUDENT IN AN ORGANIZED HEALTH CARE EDUCATION/TRAINING PROGRAM

## 2022-04-06 PROCEDURE — 36415 COLL VENOUS BLD VENIPUNCTURE: CPT | Performed by: EMERGENCY MEDICINE

## 2022-04-06 PROCEDURE — 84484 ASSAY OF TROPONIN QUANT: CPT | Performed by: STUDENT IN AN ORGANIZED HEALTH CARE EDUCATION/TRAINING PROGRAM

## 2022-04-06 RX ORDER — ATORVASTATIN CALCIUM 40 MG/1
40 TABLET, FILM COATED ORAL DAILY
Status: DISCONTINUED | OUTPATIENT
Start: 2022-04-06 | End: 2022-04-07 | Stop reason: HOSPADM

## 2022-04-06 RX ORDER — LEVETIRACETAM 500 MG/1
500 TABLET ORAL 2 TIMES DAILY
Status: DISCONTINUED | OUTPATIENT
Start: 2022-04-06 | End: 2022-04-07 | Stop reason: HOSPADM

## 2022-04-06 RX ORDER — ASPIRIN 81 MG/1
81 TABLET, CHEWABLE ORAL DAILY
Status: DISCONTINUED | OUTPATIENT
Start: 2022-04-06 | End: 2022-04-07 | Stop reason: HOSPADM

## 2022-04-06 RX ORDER — UREA 10 %
1000 LOTION (ML) TOPICAL DAILY
Status: DISCONTINUED | OUTPATIENT
Start: 2022-04-06 | End: 2022-04-07 | Stop reason: HOSPADM

## 2022-04-06 RX ORDER — LIDOCAINE 40 MG/G
CREAM TOPICAL
Status: DISCONTINUED | OUTPATIENT
Start: 2022-04-06 | End: 2022-04-07 | Stop reason: HOSPADM

## 2022-04-06 RX ORDER — DOXAZOSIN 1 MG/1
1 TABLET ORAL AT BEDTIME
Status: DISCONTINUED | OUTPATIENT
Start: 2022-04-06 | End: 2022-04-07 | Stop reason: HOSPADM

## 2022-04-06 RX ORDER — NICOTINE POLACRILEX 4 MG
15-30 LOZENGE BUCCAL
Status: DISCONTINUED | OUTPATIENT
Start: 2022-04-06 | End: 2022-04-07 | Stop reason: HOSPADM

## 2022-04-06 RX ORDER — DEXTROSE MONOHYDRATE 25 G/50ML
25-50 INJECTION, SOLUTION INTRAVENOUS
Status: DISCONTINUED | OUTPATIENT
Start: 2022-04-06 | End: 2022-04-07 | Stop reason: HOSPADM

## 2022-04-06 RX ORDER — LABETALOL HYDROCHLORIDE 5 MG/ML
10-20 INJECTION, SOLUTION INTRAVENOUS EVERY 10 MIN PRN
Status: DISCONTINUED | OUTPATIENT
Start: 2022-04-06 | End: 2022-04-07 | Stop reason: HOSPADM

## 2022-04-06 RX ORDER — LEVOTHYROXINE SODIUM 88 UG/1
88 TABLET ORAL DAILY
Status: DISCONTINUED | OUTPATIENT
Start: 2022-04-06 | End: 2022-04-07 | Stop reason: HOSPADM

## 2022-04-06 RX ORDER — HYDRALAZINE HYDROCHLORIDE 20 MG/ML
10-20 INJECTION INTRAMUSCULAR; INTRAVENOUS
Status: DISCONTINUED | OUTPATIENT
Start: 2022-04-06 | End: 2022-04-07 | Stop reason: HOSPADM

## 2022-04-06 RX ORDER — PREGABALIN 75 MG/1
75 CAPSULE ORAL 2 TIMES DAILY
Status: DISCONTINUED | OUTPATIENT
Start: 2022-04-06 | End: 2022-04-07 | Stop reason: HOSPADM

## 2022-04-06 RX ORDER — VITAMIN B COMPLEX
25 TABLET ORAL DAILY
Status: DISCONTINUED | OUTPATIENT
Start: 2022-04-06 | End: 2022-04-07 | Stop reason: HOSPADM

## 2022-04-06 RX ORDER — DULOXETIN HYDROCHLORIDE 20 MG/1
20 CAPSULE, DELAYED RELEASE ORAL DAILY
Status: DISCONTINUED | OUTPATIENT
Start: 2022-04-06 | End: 2022-04-07 | Stop reason: HOSPADM

## 2022-04-06 RX ADMIN — PREGABALIN 75 MG: 75 CAPSULE ORAL at 08:26

## 2022-04-06 RX ADMIN — INSULIN ASPART 3 UNITS: 100 INJECTION, SOLUTION INTRAVENOUS; SUBCUTANEOUS at 16:18

## 2022-04-06 RX ADMIN — Medication 25 MCG: at 08:17

## 2022-04-06 RX ADMIN — CYANOCOBALAMIN TAB 500 MCG 1000 MCG: 500 TAB at 08:17

## 2022-04-06 RX ADMIN — INSULIN ASPART 5 UNITS: 100 INJECTION, SOLUTION INTRAVENOUS; SUBCUTANEOUS at 08:20

## 2022-04-06 RX ADMIN — LEVETIRACETAM 500 MG: 500 TABLET, FILM COATED ORAL at 08:19

## 2022-04-06 RX ADMIN — ENOXAPARIN SODIUM 40 MG: 40 INJECTION SUBCUTANEOUS at 05:39

## 2022-04-06 RX ADMIN — LEVOTHYROXINE SODIUM 88 MCG: 88 TABLET ORAL at 08:19

## 2022-04-06 RX ADMIN — PREGABALIN 75 MG: 75 CAPSULE ORAL at 20:38

## 2022-04-06 RX ADMIN — ASPIRIN 81 MG: 81 TABLET, CHEWABLE ORAL at 08:18

## 2022-04-06 RX ADMIN — DOXAZOSIN 1 MG: 1 TABLET ORAL at 04:00

## 2022-04-06 RX ADMIN — LEVETIRACETAM 500 MG: 500 TABLET, FILM COATED ORAL at 20:37

## 2022-04-06 RX ADMIN — ATORVASTATIN CALCIUM 40 MG: 40 TABLET, FILM COATED ORAL at 08:19

## 2022-04-06 RX ADMIN — DOXAZOSIN 1 MG: 1 TABLET ORAL at 21:14

## 2022-04-06 RX ADMIN — DULOXETINE HYDROCHLORIDE 20 MG: 20 CAPSULE, DELAYED RELEASE ORAL at 08:22

## 2022-04-06 RX ADMIN — INSULIN ASPART 3 UNITS: 100 INJECTION, SOLUTION INTRAVENOUS; SUBCUTANEOUS at 11:57

## 2022-04-06 ASSESSMENT — ACTIVITIES OF DAILY LIVING (ADL)
ADLS_ACUITY_SCORE: 15
ADLS_ACUITY_SCORE: 20
ADLS_ACUITY_SCORE: 15
ADLS_ACUITY_SCORE: 15
ADLS_ACUITY_SCORE: 20
ADLS_ACUITY_SCORE: 15
ADLS_ACUITY_SCORE: 8
ADLS_ACUITY_SCORE: 15
DIFFICULTY_EATING/SWALLOWING: NO
WEAR_GLASSES_OR_BLIND: NO
ADLS_ACUITY_SCORE: 15
DOING_ERRANDS_INDEPENDENTLY_DIFFICULTY: YES
ADLS_ACUITY_SCORE: 8
ADLS_ACUITY_SCORE: 15
ADLS_ACUITY_SCORE: 20
TOILETING_ISSUES: NO
ADLS_ACUITY_SCORE: 15
WALKING_OR_CLIMBING_STAIRS_DIFFICULTY: YES
ADLS_ACUITY_SCORE: 15
CONCENTRATING,_REMEMBERING_OR_MAKING_DECISIONS_DIFFICULTY: NO
DRESSING/BATHING: BATHING DIFFICULTY, ASSISTANCE 1 PERSON
ADLS_ACUITY_SCORE: 15
ADLS_ACUITY_SCORE: 15
NUMBER_OF_TIMES_PATIENT_HAS_FALLEN_WITHIN_LAST_SIX_MONTHS: 3
DRESSING/BATHING_DIFFICULTY: YES
ADLS_ACUITY_SCORE: 15
ADLS_ACUITY_SCORE: 8
ADLS_ACUITY_SCORE: 8
WALKING_OR_CLIMBING_STAIRS: AMBULATION DIFFICULTY, ASSISTANCE 1 PERSON
FALL_HISTORY_WITHIN_LAST_SIX_MONTHS: YES

## 2022-04-06 ASSESSMENT — ENCOUNTER SYMPTOMS
ABDOMINAL PAIN: 0
FEVER: 0
SHORTNESS OF BREATH: 0

## 2022-04-06 NOTE — PLAN OF CARE
"/75 (BP Location: Right arm)   Pulse 88   Temp 98.2  F (36.8  C) (Oral)   Resp 16   Ht 1.6 m (5' 3\")   Wt 62.7 kg (138 lb 3.7 oz)   SpO2 96%   BMI 24.49 kg/m      VSS. Afebrile. Room air. Alert and oriented to all but time. Slow to respond to questions. Follows commands appropriately. Neuro's q 4 hrs. Unchanged minimal left sided weakness. Other neuro's intact. PERRLA. Denies pain. NPO. Denies nausea. Right and Left PIV's SL. Incontinent of urine x 1. Declined the use of a purewick. No BM. Up to commode with 1  assist and gait belt. Abrasion to right shin. Continue to monitor.  "

## 2022-04-06 NOTE — PROGRESS NOTES
"   04/06/22 1100   General Information   Onset of Illness/Injury or Date of Surgery 04/05/22   Referring Physician Zoltan Albert MD   Patient/Family Therapy Goal Statement (SLP) Pt wants some water   Pertinent History of Current Problem Pt is a 61 year old female with history of putaminal ICH, previous ischemic strokes, vascular dementia, seizure disorder (on Keppra 500 BID),  HTN, HLD, CAD s/p PCI 2004, CKD, T2DM, and hypothyroidism who presents with daughter for evaluation of 1 day of difficultly walking and 45 mins of L eye vision loss. Exam non-focal with NIHSS 2 for chronic L facial droop and month. CT/CTA unremarkable. MRI brain with possible punctate R frontoparietal infarct (unclear if ADC changes on imaging).  Clinical swallow assessment completed per MD order.   General Observations Pt awake and alert, suspected delayed processing but able to engage in simple conversation   Past History of Dysphagia Pt is familiar to SLP caseload.  Per discharge on 6/2021, pt was recommended \"dysphagia diet 2 and thin liquids with 1:1 supervision. Pt must be seated upright, take small bites/sips, eat/drink slowly, and be monitored for bolus holding. Crush pills in puree. Downgrade to nectar thick liquids if s/sx of aspiration occur with thin liquids Speech therapy will continue to follow. \"  Per pt and EMR review, pt was consuming a regular diet and thin liquids PTA, but reports she \"eats slowly\".     Type of Evaluation   Type of Evaluation Swallow Evaluation   Oral Motor   Oral Musculature anomalies present   Structural Abnormalities none present   Dentition (Oral Motor)   Dentition (Oral Motor) some missing teeth   Facial Symmetry (Oral Motor)   Facial Symmetry (Oral Motor) right side impairment   Left Side Facial Asymmetry minimal impairment   Lip Function (Oral Motor)   Lip Range of Motion (Oral Motor) protrusion impairment;retraction impairment   Protrusion, Lip Range of Motion minimal impairment;bilateral "   Retraction, Lip Range of Motion bilateral;minimal impairment   Tongue Function (Oral Motor)   Tongue ROM (Oral Motor) lateralization is impaired   Lateralization, Tongue ROM Impairment (Oral Motor) bilateral;moderate impairment   Jaw Function (Oral Motor)   Jaw Function (Oral Motor) WNL   Cough/Swallow/Gag Reflex (Oral Motor)   Volitional Throat Clear/Cough (Oral Motor) impaired;reduced strength   Volitional Swallow (Oral Motor) mildly delayed   Vocal Quality/Secretion Management (Oral Motor)   Vocal Quality (Oral Motor) WFL   Secretion Management (Oral Motor) WNL   General Swallowing Observations   Current Diet/Method of Nutritional Intake (General Swallowing Observations, NIS) NPO   Respiratory Support (General Swallowing Observations) none   Swallowing Evaluation Clinical swallow evaluation   Clinical Swallow Evaluation   Feeding Assistance frequent cues/help required   Clinical Swallow Evaluation Textures Trialed thin liquids;pureed;minced & moist;soft & bite-sized   Clinical Swallow Eval: Thin Liquid Texture Trial   Mode of Presentation, Thin Liquids cup;straw;spoon;fed by clinician   Volume of Liquid or Food Presented 3 oz   Oral Phase of Swallow WFL   Pharyngeal Phase of Swallow intact  (intemittent liquid holding)   Diagnostic Statement No overt s/sx of aspiration.  Intemittent liquid holding, able to complete swallow without and with verbal cues   Clinical Swallow Evaluation: Puree Solid Texture Trial   Mode of Presentation, Puree spoon   Volume of Puree Presented 4 tbsp   Oral Phase, Puree WFL   Pharyngeal Phase, Puree intact   Diagnostic Statement No overt s/sx of aspiration, oral phase WFL   Clinical Swallow Eval: Minced & Moist   Mode of Presentation fed by clinician   Volume Presented 5 small bites   Oral Phase delayed AP movement   Pharyngeal Phase intact   Diagnostic Statement No overt s/sx of aspiration, delayed AP transit but WFL with small bites   Clinical Swallow Eval: Soft & Bite Sized   Mode  of Presentation fed by clinician   Volume Presented 2 bites soft solid   Oral Phase delayed AP movement;impaired mastication;residue in oral cavity   Oral Residue mid posterior tongue   Pharyngeal Phase impaired;cough/choking   Diagnostic Statement High aspiration risk   Esophageal Phase of Swallow   Patient reports or presents with symptoms of esophageal dysphagia No   Swallowing Recommendations   Diet Consistency Recommendations thin liquids (level 0);minced & moist (level 5)   Supervision Level for Intake 1:1 supervision needed   Mode of Delivery Recommendations bolus size, small;slow rate of intake   Swallowing Maneuver Recommendations alternate food and liquid intake   Monitoring/Assistance Required (Eating/Swallowing) stop eating activities when fatigue is present;monitor for cough or change in vocal quality with intake   Recommended Feeding/Eating Techniques (Swallow Eval) maintain upright sitting position for eating;minimize distractions during oral intake;provide assist with feeding;provide 6 smaller meals throughout day;schedule meals prior to therapy to avoid therapy fatigue;set-up and prepare tray   Medication Administration Recommendations, Swallowing (SLP) Pills whole in puree   Instrumental Assessment Recommendations instrumental evaluation not recommended at this time;reassess via non-instrumental clinical swallow evaluation   General Therapy Interventions   Planned Therapy Interventions Dysphagia Treatment   Dysphagia treatment Modified diet education;Instruction of safe swallow strategies;Compensatory strategies for swallowing   Clinical Impression   Criteria for Skilled Therapeutic Interventions Met (SLP Eval) Yes, treatment indicated   SLP Diagnosis Mild oropharyngeal dysphagia   Risks & Benefits of therapy have been explained evaluation/treatment results reviewed;care plan/treatment goals reviewed;risks/benefits reviewed;current/potential barriers reviewed;participants voiced agreement with care  plan;participants included;patient   Clinical Impression Comments Clinical swallow assessment completed per MD order.  Pt presents with mild oropharyngeal dysphagia in the setting of weakness, impaired oral manipulation of bolus, suspected impaired cognition.  Recommend minced and moist diet (level 5) and thin liquids with supervision and feeding assist.  Recommend meds given whole in puree.  Ensure pt is fully alert and upright for all PO, given small bites/sips, alternating bites/sips.  Pt awake and alert.  Labial/lingual ROM and strengths deficits noted (weak cough), reduced cough strength, mildly delayed volitional swallow.  Delayed cough with soft solid texture, pt also noted to have delayed AP transit and reduced mastication/bolus formation with this texture.  No overt s/sx of aspiration with thin via straw/cup, puree, semisolid texture and oral phase WFL.  SLP to follow, anticipate pt may require SLP tx at discharge pending progress.   SLP Discharge Planning   SLP Discharge Recommendation Transitional Care Facility   SLP Rationale for DC Rec Swallow below baseline   SLP Brief overview of current status  Recommend minced and moist diet (level 5) and thin liquids with supervision and feeding assist.  Recommend meds given whole in puree.  Ensure pt is fully alert and upright for all PO, given small bites/sips, alternating bites/sips.      Total Evaluation Time   Total Evaluation Time (Minutes) 12   SLP Goals   Therapy Frequency (SLP Eval) 5 times/wk   SLP Predicated Duration/Target Date for Goal Attainment 04/20/22   SLP Goals Swallow   SLP: Safely tolerate diet without signs/symptoms of aspiration Regular diet;With assistance/supervision;Thin liquids;With use of swallow precautions

## 2022-04-06 NOTE — PHARMACY-CONSULT NOTE
Pharmacy Stroke Code Response    Pharmacist responded as part of the Stroke Code Team activation to patient care area ED2. The Stroke Team determined that the patient was not a candidate for IV thrombolytic therapy and the pharmacy team was dismissed at 2123.    Mian Caldera PharmD, BCPS

## 2022-04-06 NOTE — PHARMACY-CONSULT NOTE
Pharmacy Consult to evaluate for medication related stroke core measures    Isabell Matthews, 61 year old female admitted for acute R frontoparietal punctate infarct on 4/5/2022.    Thrombolytic was not given because of Time from onset contraindications    VTE Prophylaxis SCDs /PCDs placed on 4/6, as appropriate prior to end of hospital day 2.    Antithrombotic: aspirin started on 4/6, as appropriate by end of hospital day 2. Continue antithrombotic therapy on discharge to meet quality measures, unless contraindicated.    Anticoagulation if history of A-fib/flutter: Patient does not have history of A-fib/flutter - anticoagulation not required for medication related stroke core measures.     LDL Cholesterol Calculated   Date Value Ref Range Status   04/06/2022 39 <=100 mg/dL Final       Patient currently receiving Lipitor (atorvastatin) continue statin on discharge to meet quality measures, unless contraindicated.    Recommendations: None at this time      Valarie Tirado RPH 4/6/2022 9:15 AM

## 2022-04-06 NOTE — PROGRESS NOTES
Pt. Arrived from ED at this time. Alert and oriented to all but date. Slow to respond. Minimally weaker left side than right. 2 RN skin assessment done with Martha MARTEL. Abrasion noted to right knee. Wrist/fall bands applied. Seizure pads ordered

## 2022-04-06 NOTE — ED PROVIDER NOTES
[place] EMERGENCY DEPARTMENT ([facility])  4/05/22    History     Chief Complaint   Patient presents with     Stroke Symptoms     HPI  Isabell Matthews is a 61 year old female with PMH significant for prior ischemic stroke and vascular dementia, seizure disorder, nontraumatic left hemorrhagic stroke, HTN, HLD, CAD (s/p PCI 2004), CKD, type 1 diabetes, and hypothyroidism who presents to the ED by patient's daughter for evaluation of strokelike symptoms.  History is provided in majority by the patient's daughter.  Patient's daughter reports that yesterday she noticed that the patient had increased difficulty walking which subsequently worsened this morning.  She denies noting any other symptoms yesterday.  Today the patient's daughter reports coming home from getting dinner and the patient stated that she could not see out of her left eye.  Daughter reports that this was the first complaint by the patient.  The daughter then reports that 30 minutes prior to arrival she was attempting to help the patient to the bathroom when she noticed that the patient was extremely weak on her left side.  Currently, the patient endorses being able to see out of both eyes, but history is difficult to obtain from her.    Past Medical History  Past Medical History:   Diagnosis Date     Chronic pain      Coronary atherosclerosis 6/14/2016     Essential hypertension      Ex-cigarette smoker     quit 7/2018 over 35 years     Ex-cigarette smoker      Hyperlipidemia      Hypothyroid      Seizures (H)      Stroke (H)      Vascular dementia (H)      Past Surgical History:   Procedure Laterality Date     athroplasty hip Bilateral     2003, 2006     OTHER SURGICAL HISTORY      athroplasty hip     STENT       ASPIRIN NOT PRESCRIBED (INTENTIONAL)  atorvastatin (LIPITOR) 40 MG tablet  blood glucose (NO BRAND SPECIFIED) test strip  carvedilol (COREG) 6.25 MG tablet  clotrimazole (LOTRIMIN) 1 % external cream  cyanocobalamin (VITAMIN B-12)  1000 MCG tablet  diclofenac (VOLTAREN) 1 % topical gel  doxazosin (CARDURA) 1 MG tablet  DULoxetine (CYMBALTA) 20 MG capsule  insulin glargine (LANTUS PEN) 100 UNIT/ML pen  insulin lispro (HUMALOG) 100 UNIT/ML Cartridge  insulin pen needle (31G X 8 MM) 31G X 8 MM miscellaneous  levETIRAcetam (KEPPRA) 500 MG tablet  loratadine (CLARITIN) 10 mg tablet  melatonin 10 mg Tab  NIFEdipine ER (ADALAT CC) 60 MG 24 hr tablet  pregabalin (LYRICA) 75 MG capsule  SYNTHROID 88 MCG tablet  terbinafine (LAMISIL) 1 % external cream  Vitamin D3 (CHOLECALCIFEROL) 25 mcg (1000 units) tablet      Allergies   Allergen Reactions     Bee Pollen Headache     Pollen Extract Headache     Family History  Family History   Problem Relation Age of Onset     Acute Myocardial Infarction Father      Heart Disease Maternal Grandmother      Dementia Maternal Grandmother      Myocardial Infarction Father      Dementia Paternal Grandmother      Heart Disease Paternal Grandmother      Social History   Social History     Tobacco Use     Smoking status: Former Smoker     Packs/day: 0.50     Years: 35.00     Pack years: 17.50     Types: Cigarettes     Quit date: 7/1/2018     Years since quitting: 3.7     Smokeless tobacco: Never Used   Substance Use Topics     Alcohol use: Not Currently     Drug use: Not Currently      Past medical history, past surgical history, medications, allergies, family history, and social history were reviewed with the patient. No additional pertinent items.       Review of Systems   Unable to perform ROS: Dementia   Constitutional: Negative for fever.   Respiratory: Negative for shortness of breath.    Cardiovascular: Negative for chest pain.   Gastrointestinal: Negative for abdominal pain.   All other systems reviewed and are negative.    A complete review of systems was performed with pertinent positives and negatives noted in the HPI, and all other systems negative.    Physical Exam   BP: 109/89  Pulse: 99  Temp: 98.2  F (36.8   C)  Resp: 16  SpO2: 99 %  Physical Exam  Vitals and nursing note reviewed.   Constitutional:       General: She is not in acute distress.     Appearance: She is not diaphoretic.   HENT:      Head: Atraumatic.      Mouth/Throat:      Pharynx: No oropharyngeal exudate.   Eyes:      General: No scleral icterus.     Pupils: Pupils are equal, round, and reactive to light.   Cardiovascular:      Rate and Rhythm: Normal rate and regular rhythm.      Heart sounds: Normal heart sounds.   Pulmonary:      Effort: No respiratory distress.      Breath sounds: Normal breath sounds.   Abdominal:      General: Bowel sounds are normal.      Palpations: Abdomen is soft.      Tenderness: There is no abdominal tenderness.   Musculoskeletal:         General: No tenderness.   Skin:     General: Skin is warm.      Findings: No rash.   Neurological:      General: No focal deficit present.      Mental Status: She is oriented to person, place, and time.           ED Course      Procedures   8:59 PM  The patient was seen and examined by Aroldo Garcia MD in Room ED02.             EKG Interpretation:      Interpreted by Aroldo Garcia MD  Time reviewed: per Epic  Symptoms at time of EKG: none   Rhythm: normal sinus   Rate: normal  Axis: normal  Ectopy: none  Conduction: normal  ST Segments/ T Waves: No ST-T wave changes  Q Waves: none  Comparison to prior: Unchanged    Clinical Impression: normal EKG                    Results for orders placed or performed during the hospital encounter of 04/05/22   INR     Status: Normal   Result Value Ref Range    INR 1.02 0.85 - 1.15   Partial thromboplastin time     Status: Normal   Result Value Ref Range    aPTT 34 22 - 38 Seconds   Glucose by meter     Status: Abnormal   Result Value Ref Range    GLUCOSE BY METER POCT 224 (H) 70 - 99 mg/dL   [place] Draw     Status: None (In process)    Narrative    The following orders were created for panel order [place] Draw.  Procedure                                Abnormality         Status                     ---------                               -----------         ------                     Extra Red Top Tube[[id number]]                               In process                   Please view results for these tests on the individual orders.   CBC with platelets and differential     Status: None   Result Value Ref Range    WBC Count 5.4 4.0 - 11.0 10e3/uL    RBC Count 4.15 3.80 - 5.20 10e6/uL    Hemoglobin 11.7 11.7 - 15.7 g/dL    Hematocrit 36.7 35.0 - 47.0 %    MCV 88 78 - 100 fL    MCH 28.2 26.5 - 33.0 pg    MCHC 31.9 31.5 - 36.5 g/dL    RDW 13.5 10.0 - 15.0 %    Platelet Count 212 150 - 450 10e3/uL    % Neutrophils 59 %    % Lymphocytes 33 %    % Monocytes 5 %    % Eosinophils 2 %    % Basophils 1 %    % Immature Granulocytes 0 %    NRBCs per 100 WBC 0 <1 /100    Absolute Neutrophils 3.3 1.6 - 8.3 10e3/uL    Absolute Lymphocytes 1.8 0.8 - 5.3 10e3/uL    Absolute Monocytes 0.3 0.0 - 1.3 10e3/uL    Absolute Eosinophils 0.1 0.0 - 0.7 10e3/uL    Absolute Basophils 0.0 0.0 - 0.2 10e3/uL    Absolute Immature Granulocytes 0.0 <=0.4 10e3/uL    Absolute NRBCs 0.0 10e3/uL   Creatinine POCT     Status: Abnormal   Result Value Ref Range    Creatinine POCT 1.6 (H) 0.5 - 1.0 mg/dL    GFR, ESTIMATED POCT 36 (L) >60 mL/min/1.73m2   EKG 12-lead, tracing only     Status: None (Preliminary result)   Result Value Ref Range    Systolic Blood Pressure  mmHg    Diastolic Blood Pressure  mmHg    Ventricular Rate 83 BPM    Atrial Rate 83 BPM    RI Interval 128 ms    QRS Duration 94 ms     ms    QTc 472 ms    P Axis 38 degrees    R AXIS 49 degrees    T Axis 39 degrees    Interpretation ECG       Sinus rhythm  Low voltage QRS  Inferior infarct , age undetermined  Abnormal ECG     CBC with Platelets & Differential     Status: None    Narrative    The following orders were created for panel order CBC with Platelets & Differential.  Procedure                               Abnormality          Status                     ---------                               -----------         ------                     CBC with platelets and d...[[id number]]                      Final result                 Please view results for these tests on the individual orders.     Medications   iopamidol (ISOVUE-370) solution 75 mL (75 mLs Intravenous Given 4/5/22 2120)   sodium chloride (PF) 0.9% PF flush 90 mL (90 mLs Intravenous Given 4/5/22 2122)        Assessments & Plan (with Medical Decision Making)     61 year old female with PMH significant for prior ischemic stroke and vascular dementia, seizure disorder, nontraumatic left hemorrhagic stroke, HTN, HLD, CAD (s/p PCI 2004), CKD, type 1 diabetes, and hypothyroidism who presents to the ED by patient's daughter for evaluation of strokelike symptoms. Vital signs in triage stable with blood pressure 109/89, pulse normal at 99, respiration normal 16, pulse ox normal at 99% on room air.  Patient afebrile at 98.2 degrees Fahrenheit.  IV established, labs drawn sent reviewed document epic and notable for creatinine 1.46 which is somewhat improved from her prior values otherwise normal CBC electrolytes.  Troponin elevated at 56.  UA ordered and pending at time of this dictation.  Stroke activated soon after arrival, please refer to stroke evaluation.  For further details.  Patient sent to CT for imaging of the head and CT angio of head neck which revealed no acute stroke but some narrowing of intracranial vessels.  MRI advised by neurology and ordered and pending at the time of this dictation.  Patient was signed out to overnight staff follow-up with imaging results and completion of neurology evaluation.  Patient will possibly be admitted to neurology for CVA versus TIA and/or medicine service for further evaluation care.    I have reviewed the nursing notes. I have reviewed the findings, diagnosis, plan and need for follow up with the patient.    New Prescriptions    No  medications on file       Final diagnoses:   Acute CVA (cerebrovascular accident) (H)     IZach, am serving as a trained medical scribe to document services personally performed by Aroldo Garcia MD, based on the provider's statements to me.     Aroldo BARRIENTOS MD, was physically present and have reviewed and verified the accuracy of this note documented by Zach Hudson.    --  Aroldo Garcia MD  [facility] EMERGENCY DEPARTMENT  4/5/2022     Aroldo Garcia MD  04/07/22 [id number]

## 2022-04-06 NOTE — UTILIZATION REVIEW
Admission Status; Secondary Review Determination    Under the authority of the Utilization Management Committee, the utilization review process indicated a secondary review on the above patient. The review outcome is based on review of the medical records, discussions with staff, and applying clinical experience noted on the date of the review.    (x) Inpatient Status Appropriate - This patient's medical care is consistent with medical management for inpatient care and reasonable inpatient medical practice.    RATIONALE FOR DETERMINATION:61-year-old female with complex vascular history including putaminal ICH, previous ischemic strokes, vascular dementia, seizure disorder as well as coronary disease status post intervention 2004, hypertension, type 2 diabetes and chronic kidney disease.  Patient now presents with acute onset of difficulty walking and acute left eye vision loss.  MRI imaging reveals an acute infarct in the right frontal parietal high convexity within the sensorimotor region.  Inpatient management appropriate for acute recurrent ischemic infarct.    At the time of admission with the information available to the attending physician more than 2 nights Hospital complex care was anticipated, based on patient risk of adverse outcome if treated as outpatient and complex care required. Inpatient admission is appropriate based on the Medicare guidelines.    This document was produced using voice recognition software    The information on this document is developed by the utilization review team in order for the business office to ensure compliance. This only denotes the appropriateness of proper admission status and does not reflect the quality of care rendered.    The definitions of Inpatient Status and Observation Status used in making the determination above are those provided in the CMS Coverage Manual, Chapter 1 and Chapter 6, section 70.4.    Sincerely,    Anival Null MD  Utilization  Review  Physician Advisor  [facility].

## 2022-04-06 NOTE — PROGRESS NOTES
04/06/22 1415   Quick Adds   Type of Visit Initial PT Evaluation   Living Environment   People in Home child(tiffany), adult   Current Living Arrangements apartment   Home Accessibility no concerns   Transportation Anticipated family or friend will provide   Living Environment Comments Pt lives in apartment with her daughter. Pt has a walk in shower with a built in seat. Would like to get grab bars.    Self-Care   Usual Activity Tolerance moderate   Current Activity Tolerance fair   Regular Exercise No   Equipment Currently Used at Home walker, rolling   Fall history within last six months yes   Number of times patient has fallen within last six months 6   Activity/Exercise/Self-Care Comment Per daughter report they have a 4WW but does not use it, daughter reports she is there most of the day and is usually with the patient when walking around, they have wc, had home health after last stroke last summer.   General Information   Onset of Illness/Injury or Date of Surgery 04/05/22   Referring Physician Zoltan Albert MD   Patient/Family Therapy Goals Statement (PT) Return home.    Pertinent History of Current Problem (include personal factors and/or comorbidities that impact the POC) Isabell Matthews is a 61 year old female with history of putaminal ICH, previous ischemic strokes, vascular dementia, seizure disorder (on Keppra 500 BID),  HTN, HLD, CAD s/p PCI 2004, CKD, T2DM, and hypothyroidism who presents with daughter for evaluation of 1 day of difficultly walking and 45 mins of L eye vision loss. The patient was reportedly in her normal state of health until 4/4/22 at ~7 PM when she began to have some increased difficultly walking with veering to the left. These symptoms progressed and the patient had worsening difficultly walking and could not see out of her left eye which prompted the daughter to bring the patient to the hospital. On arrival, a stroke code was called at 9300. On evaluation, the patient reported that  the vision loss was blurriness and that she was able to see in all 4 quadrants still. Unclear if the vision loss was more severe prior as the patient is somewhat confused. CTH/CTA obtained and without acute changes. Stroke code was deescalated at 2126. No TNK or thrombectomy due to >24 hrs of symptoms and no LVO. Subsequent MRI brain showed a punctate R frontoparietal infarct. Patient does not take any blood thinning medications, ASA or Plavix. No recent infections or illnesses per daughter. Patient reports no focal weakness or numbness.       Existing Precautions/Restrictions fall;seizures   Cognition   Orientation Status (Cognition) oriented to;person   Cognitive Status Comments reports she [place] in [place] (lives in [place]), very slow to respond, lethargic.    Strength (Manual Muscle Testing)   Strength Comments LE strength grossly symmetric 4/5, generalized weakness demonstrated with impaired balance and gait.    Bed Mobility   Comment, (Bed Mobility) Danny x1, increased time/efforot   Transfers   Comment, (Transfers) CGA x1 sit<>stand, increased time/effort.   Gait/Stairs (Locomotion)   Comment, (Gait/Stairs) Patient ambulates 40 ft with FWW and CGA to Danny x1, leaning to the L, occasional maxA for FWW propulsion, shuffled gait, required seated rest break. Unable to ambulate without UE support on the walker.    Balance   Balance Comments heavily relies on FWW in standing. Occasionally requires maxA for sittin gbalance on the EOB.    Clinical Impression   Criteria for Skilled Therapeutic Intervention Yes, treatment indicated   PT Diagnosis (PT) Impaired functional mobility.    Influenced by the following impairments weakness, impaired balance, deconditioning   Functional limitations due to impairments transfers, ambulation, home mobility   Clinical Presentation (PT Evaluation Complexity) Stable/Uncomplicated   Clinical Presentation Rationale clinical judgement.    Clinical Decision Making (Complexity)  low complexity   Planned Therapy Interventions (PT) balance training;bed mobility training;gait training;home exercise program;neuromuscular re-education;strengthening   Risk & Benefits of therapy have been explained evaluation/treatment results reviewed;care plan/treatment goals reviewed;risks/benefits reviewed;current/potential barriers reviewed;participants voiced agreement with care plan;participants included;patient;daughter   PT Discharge Planning   PT Discharge Recommendation (DC Rec) home with assist;home with home care physical therapy   PT Rationale for DC Rec Patient below baseline independent mobility. Patient is at risk for falls currently, per daughter who is the patients PCA, they will want to bring her home and will have 24-7 support and assist. They have walker and wc and would benefit from home care services.    PT Brief overview of current status 1A FWW short distance.    Total Evaluation Time   Total Evaluation Time (Minutes) 6   Physical Therapy Goals   PT Frequency 5x/week   PT Predicated Duration/Target Date for Goal Attainment 05/07/22   PT Goals Bed Mobility;Transfers;Gait;PT Goal 1   PT: Bed Mobility Modified independent;Supine to/from sit   PT: Transfers Modified independent;Sit to/from stand;Bed to/from chair   PT: Gait Rolling walker;Supervision/stand-by assist;150 feet   PT: Goal 1 Patient and family to be mod I with HEP in order to improve strength and home safety.

## 2022-04-06 NOTE — CONSULTS
"Care Management Initial Consult    General Information  Assessment completed with: Patient, Children, Pt & daughter Vishal  Type of CM/SW Visit: Initial Assessment    Primary Care Provider verified and updated as needed: Yes   Readmission within the last 30 days: no previous admission in last 30 days      Reason for Consult: discharge planning  Advance Care Planning: Advance Care Planning Reviewed: no concerns identified          Communication Assessment  Patient's communication style: spoken language English  Hearing Difficulty or Deaf: no   Wear Glasses or Blind: no    Cognitive  Cognitive/Neuro/Behavioral: .WDL except, orientation, speech  Level of Consciousness: confused  Arousal Level: opens eyes spontaneously  Orientation: disoriented to, time  Mood/Behavior: hypoactive (quiet, withdrawn)  Best Language: 0 - No aphasia  Speech: slow    Living Environment:   People in home: child(tiffany), adult Vishal  Current living Arrangements: apartment      Able to return to prior arrangements: other (see comments)  Living Arrangement Comments:  TCU recs    Family/Social Support:  Care provided by: adult child Vishal  Provides care for: no one, unable/limited ability to care for self.  Pt's daughter Vishal reports that she is a \"total care\"  Marital Status:   Children (Pt's lives with daughter Vishal)          Description of Support System: Involved, Supportive    Support Assessment: Adequate family and caregiver support    Current Resources:   Patient receiving home care services: Yes  Skilled Home Care Services:  (PCA services)  Community Resources: None  Equipment currently used at home: walker, rolling  Supplies currently used at home:      Employment/Financial:  Employment Status: disabled        Financial Concerns: No concerns identified   Referral to Financial Worker: No       Lifestyle & Psychosocial Needs:  Social Determinants of Health     Tobacco Use: Medium Risk     Smoking Tobacco Use: Former Smoker " "    Smokeless Tobacco Use: Never Used   Alcohol Use: Not on file   Financial Resource Strain: Not on file   Food Insecurity: Not on file   Transportation Needs: Not on file   Physical Activity: Not on file   Stress: Not on file   Social Connections: Not on file   Intimate Partner Violence: Not on file   Depression: At risk     PHQ-2 Score: 4   Housing Stability: Not on file       Functional Status:  Prior to admission patient needed assistance:  Pt's daughter Vishal reports that pt was a \"total assist and pretty much needs helps with everything.\"           Mental Health Status: reported by pt's daughter - depression, anxiety, vascular dementia    Chemical Dependency Status: none reported by pt's daughter                Values/Beliefs:  Spiritual, Cultural Beliefs, Hoahaoism Practices, Values that affect care: yes          Values/Beliefs Comment: Nanda    Additional Information:  SW introduced self to pt and pt's daughter Vishal.  Pt was able to give her name and her partial address.  Pt appeared to be confused and falling asleep during CMA.  Pt's daughter answered the remaining CMA questions.  Pt's daughter Vishal reports that pt was a \"total assist and pretty much needs helps with everything.\"  Pt's daughter also reports that pt was getting 70hrs/wk of PCA throught [facility], but reports that she does \"not want to go back to them because they never show up.\"  She also notes that pt receives disability and that she handles her finances for pt, but she only has rent.  Pt's daughter would like to look into getting [employer] to help with HCD.  KINZA reached out to pt's team to request MOCHA/SLUMS order.    SW will follow.  REBECA LozanoW, LSW  6B Orientating ICU Step Down Licensed Social Worker  [facility]  Office: [phone]  Pager: [phone]  Fax: [phone]         "

## 2022-04-06 NOTE — PROGRESS NOTES
Stroke Code Nurse-Responder Note    Arrival Time to Stroke Code: 2110    Stroke Code Team interventions:   - Head CT/CTA  - De-escalated at 2127 by Dr. Sanchez, Neurology fellow    ED/Bedside Nurse providing handoff: Kevin FAROOQ RN    Time left for CT: 2115    Time arrived to next location (ED/Unit/IR): 2125    ED/Bedside Nurse given handoff (name/time): DELONTE Rahman RN

## 2022-04-06 NOTE — PROGRESS NOTES
[facility]    Stroke Progress Note    Interval EventsNo acute events overnight. The patient did not endorse any complaints. She was cooperative with physical exam.     HPI Summary  Isabell Matthews is a 61 year old female with history of putaminal ICH, previous ischemic strokes, vascular dementia, seizure disorder (on Keppra 500 BID),  HTN, HLD, CAD s/p PCI 2004, CKD, T2DM, and hypothyroidism who presents with daughter for evaluation of 1 day of difficultly walking and 45 mins of L eye vision loss. The patient was reportedly in her normal state of health until 4/4/22 at ~7 PM when she began to have some increased difficultly walking with veering to the left. These symptoms progressed and the patient had worsening difficultly walking and could not see out of her left eye which prompted the daughter to bring the patient to the hospital. On arrival, a stroke code was called at 2105. On evaluation, the patient reported that the vision loss was blurriness and that she was able to see in all 4 quadrants still. Unclear if the vision loss was more severe prior as the patient is somewhat confused. CTH/CTA obtained and without acute changes. Stroke code was deescalated at 212. No TNK or thrombectomy due to >24 hrs of symptoms and no LVO. Subsequent MRI brain showed a punctate R frontoparietal infarct. Patient does not take any blood thinning medications, ASA or Plavix. No recent infections or illnesses per daughter. Patient reports no focal weakness or numbness.               Stroke Evaluation Summarized    MRI/Head CT MRI  1.  Right frontoparietal high convexity within the sensorimotor region punctate acute infarct.   2.  No additional acute infarcts.  3.  Multiple chronic infarcts described above.  4.  Age related changes described above.  5.  Chronic microhemorrhages described above.  CTH:  No acute intracranial pathology.   Unchanged chronic findings of chronic lacunar  infarction in the right  basal ganglia and left external capsule. Advanced cortical cerebral  and cerebellar volume loss is more than expected for age.2. ASPECT Score: 10/10.   Intracranial Vasculature CTA:   1. Head CTA demonstrates multifocal different levels of stenoses of  middle and posterior cerebral artery distal segments. The etiology is  probable atherosclerotic disease. No definite occlusive thrombus is  identified.      2. Neck CTA demonstrates minimal atherosclerotic calcifications of the  left greater than right carotid bulbs.     Echocardiogram Global and regional left ventricular function is normal with an EF of 55-60%.  Global right ventricular function is normal. The right ventricle is normal  size.  No significant valvular abnormalities.  IVC diameter <2.1 cm collapsing >50% with sniff suggests a normal RA pressure  of 3 mmHg.  There is no prior study for direct comparison.     LDL  4/6/2022: 39 mg/dL   A1C  4/5/2022: 8.8 %   Troponin No lab value available in past 48 hrs       Impression   61 year old female with history of putaminal ICH, previous ischemic strokes, vascular dementia, seizure disorder (on Keppra 500 BID),  HTN, HLD, CAD s/p PCI 2004, CKD, T2DM, and hypothyroidism who presents with daughter for evaluation of 1 day of difficultly walking and 45 mins of L eye vision loss. Exam non-focal with NIHSS 2 for chronic L facial droop and month. CT/CTA unremarkable. MRI brain with possible punctate R frontoparietal infarct (unclear if ADC changes on imaging). For now, plan to admit the patient for stroke work-up.      Plan  #Acute R frontoparietal punctate infarct  #History of L putaminal ICH  - Avoid hypotonic IV fluids  - Daily aspirin 81 mg for secondary stroke prevention  - Statin: Cont PTA atorva 40, pending LDL, goal LDL 40-70  - Patient is >24 hours out from onset of symptoms, therefore goal normotension  - TTE with Bubble Study  - Telemetry, EKG  - Bedside Glucose Monitoring  -  Nutrition: NPO, pending bedside swallow  - A1c, Lipid Panel, Troponin x 3  - PT/OT/SLP  - PM&R  - Stroke Education  - Depression Screen  - Apnea Screen  - Euthermia, Euglycemia      #Seizure disorder  - Continue PTA Keppra 500 mg BID     #T2DM  - MDSSI      #CKD3  - Daily BMP    #HTN  - Holding PTA coreg and nifedipine d/t borderline hypotension on arrival     #HLD  - PTA atrova 40     #CAD s/p PCI  - Holding PTA coreg and nifedipine d/t borderline hypotension on arrival     #Hypothyroidism  - Continue PTA synthroid     #Vascular dementia  - Delirium precautions     Prophylaxis            For VTE Prevention:  - enoxaparin     For Acid Suppression:  - GI prophylaxis is not indicated     Code Status  Full Code for tonight, patient wishes to discuss further in the AM with her daughter present     During initial physical assessment, the plan of care was discussed and developed with patient and child.  Plan of care includes: the above.     Patient was admitted via [facility] ([place])     The patient was admitted to the stroke team.      Patient discussed over the phone with stroke staff is Dr. Charles Hay MD  Neurology PGY1  ASCOM: [id number]  ______________________________________________________    Clinically Significant Risk Factors Present on Admission           # Hypomagnesemia: Mg = 1.5 mg/dL (Ref range: 1.6 - 2.3 mg/dL) on admission, will replace as needed     # Platelet Defect: home medication list includes an antiplatelet medication       Medications   Scheduled Meds    aspirin  81 mg Oral Daily     atorvastatin  40 mg Oral Daily     cyanocobalamin  1,000 mcg Oral Daily     doxazosin  1 mg Oral At Bedtime     DULoxetine  20 mg Oral Daily     enoxaparin ANTICOAGULANT  40 mg Subcutaneous Q24H     insulin aspart  1-7 Units Subcutaneous TID AC     insulin aspart  1-5 Units Subcutaneous At Bedtime     levETIRAcetam  500 mg Oral BID     levothyroxine  88 mcg Oral Daily      pregabalin  75 mg Oral BID     sodium chloride (PF)  3 mL Intracatheter Q8H     Vitamin D3  25 mcg Oral Daily       Infusion Meds    - MEDICATION INSTRUCTIONS -         PRN Meds  glucose **OR** dextrose **OR** glucagon, labetalol **OR** hydrALAZINE, lidocaine 4%, lidocaine (buffered or not buffered), - MEDICATION INSTRUCTIONS -, sodium chloride (PF)       PHYSICAL EXAMINATION  Temp:  [97.9  F (36.6  C)-98.8  F (37.1  C)] 97.9  F (36.6  C)  Pulse:  [81-99] 81  Resp:  [14-18] 18  BP: (109-145)/(61-89) 136/71  SpO2:  [96 %-99 %] 96 %      General: Awake and alert in NAD   HEENT: Normocephalic, atraumatic, no epistaxis, no oral lesions  Resp: Breathing comfortably on room air  Extremities: Warm and well perfused, peripheral pulses present, no edema  Skin: Not jaundiced, no rash, no ecchymoses     Neuro:  Mental status: Awake, alert, attentive; oriented to person, place, but not to month (November). Thought the current president was [place]inai. Could not count down from 20 but could count down from 10,  Could name all the days of thee week but could not say it backwards.  Speech: Fluent, comprehension intact; mildly dysarthric, no paraphasic errors noted  Cranial nerves: Visual fields intact in both eyes, Eyes conjugate, PERRL, EOMI w/ normal and smooth pursuit, face expression symmetric, facial sensation intact to light touch and symmetric, hearing intact to conversation, shoulder shrug strong  Motor: Tone normal. Able to hold up bilateral upper extremities for 10s and bilateral lower for 5s without drift. No abnormal movements noted. Pronator drift absent.   Sensory: Intact to light touch in all 4 extremities  Coordination: FNF intact bilaterally with no dysmetria; Normal heel-shin test bilaterally with no dysmetria noted  Gait:  Deferred    Imaging  I personally reviewed all imaging; relevant findings per HPI.     Lab Results Data   CBC  Recent Labs   Lab 04/05/22 2115   WBC 5.4   RBC 4.15   HGB 11.7   HCT 36.7         Basic Metabolic Panel    Recent Labs   Lab 04/06/22  1156 04/06/22  0723 04/06/22  0407 04/06/22  0323 04/06/22  0014 04/05/22 2116 04/05/22 2113   NA  --   --   --   --   --  139  --    POTASSIUM  --   --  3.9  --   --  3.8  --    CHLORIDE  --   --   --   --   --  106  --    CO2  --   --   --   --   --  26  --    BUN  --   --   --   --   --  25  --    CR  --   --   --   --  1.20* 1.46* 1.6*   * 367*  --  232*  --  240*  --    VERONICA  --   --   --   --   --  8.9  --      Liver Panel  No results for input(s): PROTTOTAL, ALBUMIN, BILITOTAL, ALKPHOS, AST, ALT, BILIDIRECT in the last 168 hours.  INR    Recent Labs   Lab Test 04/05/22 2115 06/21/21  1618 09/27/20  1601   INR 1.02 0.91 0.99      Lipid Profile    Recent Labs   Lab Test 04/06/22  0014 08/02/21  1009   CHOL 99 146   HDL 34* 39*   LDL 39 75   TRIG 129 158*     A1C    Recent Labs   Lab Test 04/05/22 2115 08/02/21  1009 06/21/21 2011   A1C 8.8* 8.2* 7.8*     Troponin I    Recent Labs   Lab 04/06/22  0407 04/06/22  0014 04/05/22 2116   TROPONINIS 44 41 56*

## 2022-04-06 NOTE — H&P
[facility]    Stroke Admission Note    Chief Complaint  Vision loss in L eye    HPI  Isabell Matthews is a 61 year old female with history of putaminal ICH, previous ischemic strokes, vascular dementia, seizure disorder (on Keppra 500 BID),  HTN, HLD, CAD s/p PCI 2004, CKD, T2DM, and hypothyroidism who presents with daughter for evaluation of 1 day of difficultly walking and 45 mins of L eye vision loss. The patient was reportedly in her normal state of health until 4/4/22 at ~7 PM when she began to have some increased difficultly walking with veering to the left. These symptoms progressed and the patient had worsening difficultly walking and could not see out of her left eye which prompted the daughter to bring the patient to the hospital. On arrival, a stroke code was called at 2103. On evaluation, the patient reported that the vision loss was blurriness and that she was able to see in all 4 quadrants still. Unclear if the vision loss was more severe prior as the patient is somewhat confused. CTH/CTA obtained and without acute changes. Stroke code was deescalated at 2126. No TNK or thrombectomy due to >24 hrs of symptoms and no LVO. Subsequent MRI brain showed a punctate R frontoparietal infarct. Patient does not take any blood thinning medications, ASA or Plavix. No recent infections or illnesses per daughter. Patient reports no focal weakness or numbness.            Intravenous Thrombolysis  Not given due to:   - unclear or unfavorable risk-benefit profile for extended window thrombolysis beyond the conventional 4.5 hour time window    Endovascular Treatment  Not initiated due to absence of proximal vessel occlusion    Impression   61 year old female with history of putaminal ICH, previous ischemic strokes, vascular dementia, seizure disorder (on Keppra 500 BID),  HTN, HLD, CAD s/p PCI 2004, CKD, T2DM, and hypothyroidism who presents with daughter for evaluation of 1  day of difficultly walking and 45 mins of L eye vision loss. Exam non-focal with NIHSS 2 for chronic L facial droop and month. CT/CTA unremarkable. MRI brain with possible punctate R frontoparietal infarct (unclear if ADC changes on imaging). For now, plan to admit the patient for stroke work-up.     Plan  #Acute R frontoparietal punctate infarct  #History of L putaminal ICH  - Avoid hypotonic IV fluids  - Daily aspirin 81 mg for secondary stroke prevention  - Statin: Cont PTA atorva 40, pending LDL, goal LDL 40-70  - Patient is >24 hours out from onset of symptoms, therefore goal normotension  - TTE with Bubble Study  - Telemetry, EKG  - Bedside Glucose Monitoring  - Nutrition: NPO, pending bedside swallow  - A1c, Lipid Panel, Troponin x 3  - PT/OT/SLP  - PM&R  - Stroke Education  - Depression Screen  - Apnea Screen  - Euthermia, Euglycemia      #Seizure disorder  - Continue PTA Keppra 500 mg BID    #T2DM  - MDSSI     #CKD3  - Daily BMP    #HTN  - Holding PTA coreg and nifedipine d/t borderline hypotension on arrival    #HLD  - PTA atrova 40    #CAD s/p PCI  - Holding PTA coreg and nifedipine d/t borderline hypotension on arrival    #Hypothyroidism  - Continue PTA synthroid    #Vascular dementia  - Delirium precautions    Prophylaxis            For VTE Prevention:  - enoxaparin    For Acid Suppression:  - GI prophylaxis is not indicated    Code Status  Full Code for tonight, patient wishes to discuss further in the AM with her daughter present    During initial physical assessment, the plan of care was discussed and developed with patient and child.  Plan of care includes: the above.    Patient was admitted via [facility] ([place])    The patient was admitted to the stroke team.     Patient discussed over the phone with stroke fellow Dr. Thurman. Stroke staff is Dr. Charles Albert MD  PGY-2 Neurology Resident  Stroke [facility]  *[id number]  ___________________________________________________    Nutrition: NPO, pending bedside swallow  Orders Placed This Encounter      NPO for Medical/Clinical Reasons Except for: No Exceptions    Clinically Significant Risk Factors Present on Admission               # Platelet Defect: home medication list includes an antiplatelet medication    # CKD, Stage 3b (GFR 30-44): Will monitor and treat as appropriate  - last Cr =  1.46 mg/dL (Ref range: 0.52 - 1.04 mg/dL)  - last GFR = 40 mL/min/1.73m2 (Ref range: >60 mL/min/1.73m2)       Past Medical History   Past Medical History:   Diagnosis Date     Chronic pain      Coronary atherosclerosis 6/14/2016     Essential hypertension      Ex-cigarette smoker     quit 7/2018 over 35 years     Ex-cigarette smoker      Hyperlipidemia      Hypothyroid      Seizures (H)      Stroke (H)      Vascular dementia (H)      Past Surgical History   Past Surgical History:   Procedure Laterality Date     athroplasty hip Bilateral     2003, 2006     OTHER SURGICAL HISTORY      athroplasty hip     STENT       Medications   Home Meds  Prior to Admission medications    Medication Sig Start Date End Date Taking? Authorizing Provider   ASPIRIN NOT PRESCRIBED (INTENTIONAL) Please choose reason not prescribed from choices below. 7/27/21   Bony Castaneda MD   atorvastatin (LIPITOR) 40 MG tablet Take 1 tablet (40 mg) by mouth daily 7/27/21   Bony Castaneda MD   blood glucose (NO BRAND SPECIFIED) test strip Use to test blood sugar 4-5 times daily or as directed. 1/14/22   Bony Castaneda MD   carvedilol (COREG) 6.25 MG tablet 1 tablet (6.25 mg) by Oral or Feeding Tube route 2 times daily (with meals) 7/27/21   Bony Castaneda MD   clotrimazole (LOTRIMIN) 1 % external cream Apply topically 2 times daily Until rash resolved 11/2/20   Bony Castaneda MD   cyanocobalamin (VITAMIN B-12) 1000 MCG tablet Take 1 tablet (1,000 mcg) by mouth daily 9/2/20   Bony Castaneda MD    diclofenac (VOLTAREN) 1 % topical gel Apply 2 g topically 4 times daily as needed for moderate pain 2/25/21   Bony Castaneda MD   doxazosin (CARDURA) 1 MG tablet Take 1 tablet (1 mg) by mouth At Bedtime 7/26/21   Bony Castaneda MD   DULoxetine (CYMBALTA) 20 MG capsule Take 1 capsule (20 mg) by mouth daily 9/7/21   Bony Castaneda MD   insulin glargine (LANTUS PEN) 100 UNIT/ML pen Please inject 18 units at bedtime change of dose 8/13/21   Bony Castaneda MD   insulin lispro (HUMALOG) 100 UNIT/ML Cartridge Take prior to meal with sliding scale per glucose level adjust as noted in notes usual dose around 20 units daily,1 unit to max 13 units Prior to meals taking 2 meals daily,1 unit with food premeal plus additional 140-169 ([phone] -199 (4) 200-229(5) 230-259(6) 260-289( 7) 290-319(8) 320-349(9) 350-379 (10) 380-409(11) greater 410(12)y 8/13/21   Bony Castaneda MD   insulin pen needle (31G X 8 MM) 31G X 8 MM miscellaneous Use 4 pen needles daily or as directed. 9/2/20   Bony Castaneda MD   levETIRAcetam (KEPPRA) 500 MG tablet Take 1 tablet (500 mg) by mouth 2 times daily 8/30/21   Bony Castaneda MD   loratadine (CLARITIN) 10 mg tablet [LORATADINE (CLARITIN) 10 MG TABLET] Take 10 mg by mouth daily. 6/28/21   Provider, Historical   melatonin 10 mg Tab [MELATONIN 10 MG TAB] Take 10 mg by mouth daily. 6/28/21   Provider, Historical   NIFEdipine ER (ADALAT CC) 60 MG 24 hr tablet Take 1 tablet (60 mg) by mouth daily 2/10/22   Bony Castaneda MD   pregabalin (LYRICA) 75 MG capsule TAKE 1 CAPSULE(75 MG) BY MOUTH TWICE DAILY 4/5/22   Bony Castaneda MD   SYNTHROID 88 MCG tablet Take 1 tablet (88 mcg) by mouth daily 7/26/21   Bony Castaneda MD   terbinafine (LAMISIL) 1 % external cream Apply topically to gume of rash twice daily if needed 2/25/21   Bony Castaneda MD   Vitamin D3 (CHOLECALCIFEROL) 25 mcg (1000 units) tablet Take 1 tablet (25 mcg) by mouth daily  9/2/20   Bony Castaneda MD       Scheduled Meds    aspirin  81 mg Oral Daily       Infusion Meds      PRN Meds      Allergies   Allergies   Allergen Reactions     Bee Pollen Headache     Pollen Extract Headache     Family History   Family History   Problem Relation Age of Onset     Acute Myocardial Infarction Father      Heart Disease Maternal Grandmother      Dementia Maternal Grandmother      Myocardial Infarction Father      Dementia Paternal Grandmother      Heart Disease Paternal Grandmother      Social History   Social History     Tobacco Use     Smoking status: Former Smoker     Packs/day: 0.50     Years: 35.00     Pack years: 17.50     Types: Cigarettes     Quit date: 7/1/2018     Years since quitting: 3.7     Smokeless tobacco: Never Used   Substance Use Topics     Alcohol use: Not Currently     Drug use: Not Currently       Review of Systems   The 10 point Review of Systems is negative other than noted in the HPI or here.        PHYSICAL EXAMINATION  Temp:  [98.2  F (36.8  C)] 98.2  F (36.8  C)  Pulse:  [85-99] 85  Resp:  [14-17] 14  BP: (109-124)/(61-89) 124/63  SpO2:  [97 %-99 %] 97 %    General: Awake and alert in NAD   HEENT: Normocephalic, atraumatic, no epistaxis, no oral lesions  Resp: Breathing comfortably on room air  Extremities: Warm and well perfused, peripheral pulses present, no edema  Skin: Not jaundiced, no rash, no ecchymoses    Neuro:  Mental status: Awake, alert, attentive; oriented to person, place, but not to month (November)  Speech: Fluent, comprehension intact; No dysarthria, no paraphasic errors noted  Cranial nerves: Visual fields intact in both eyes, Eyes conjugate, PERRL, EOMI w/ normal and smooth pursuit, face expression symmetric, facial sensation intact to light touch and symmetric, hearing intact to conversation, shoulder shrug strong  Motor: Tone normal. Able to hold up bilateral upper extremities for 10s and bilateral lower for 5s without drift. No abnormal movements  noted. Pronator drift absent.   Sensory: Intact to light touch in all 4 extremities  Coordination: FNF intact bilaterally with no dysmetria; Normal heel-shin test bilaterally with no dysmetria noted  Gait:  Deferred      Dysphagia Screen  Per Nursing    Stroke Scales    NIHSS  1a. Level of Consciousness 0-->Alert, keenly responsive   1b. LOC Questions 1-->Answers one question correctly   1c. LOC Commands 0-->Performs both tasks correctly   2.   Best Gaze 0-->Normal   3.   Visual 0-->No visual loss   4.   Facial Palsy 1-->Minor paralysis (flattened nasolabial fold, asymmetry on smiling)   5a. Motor Arm, Left 0-->No drift, limb holds 90 (or 45) degrees for full 10 secs   5b. Motor Arm, Right 0-->No drift, limb holds 90 (or 45) degrees for full 10 secs   6a. Motor Leg, Left 0-->No drift, leg holds 30 degree position for full 5 secs   6b. Motor Leg, right 0-->No drift, leg holds 30 degree position for full 5 secs   7.   Limb Ataxia 0-->Absent   8.   Sensory 0-->Normal, no sensory loss   9.   Best Language 0-->No aphasia, normal   10. Dysarthria 0-->Normal   11. Extinction and Inattention  0-->No abnormality   Total 2 (04/05/22 2222)       Modified Timothy Score (Pre-morbid)  1-No significant disability despite symptoms    Imaging  I personally reviewed all imaging; relevant findings per the HPI.    Lab Results Data   CBC  Recent Labs   Lab 04/05/22 2115   WBC 5.4   RBC 4.15   HGB 11.7   HCT 36.7        Basic Metabolic Panel   Recent Labs   Lab 04/05/22 2116 04/05/22 2113 04/05/22 2102     --   --    POTASSIUM 3.8  --   --    CHLORIDE 106  --   --    CO2 26  --   --    BUN 25  --   --    CR 1.46* 1.6*  --    *  --  224*   VERONICA 8.9  --   --      Liver Panel  No results for input(s): PROTTOTAL, ALBUMIN, BILITOTAL, ALKPHOS, AST, ALT, BILIDIRECT in the last 168 hours.  INR    Recent Labs   Lab Test 04/05/22 2115 06/21/21  1618 09/27/20  1601   INR 1.02 0.91 0.99      Lipid Profile    Recent Labs   Lab  Test 08/02/21  1009   CHOL 146   HDL 39*   LDL 75   TRIG 158*     A1C    Recent Labs   Lab Test 08/02/21  1009 06/21/21 2011 09/27/20  1601   A1C 8.2* 7.8* 11.7*     Troponin I    Recent Labs   Lab 04/06/22  0014 04/05/22 2116   TROPONINIS 41 56*          Stroke Code / Stroke Consult Data Data    Stroke code activated 04/05/22 2104   First stroke provider response 04/05/22 2107   Last known normal 04/04/22   1900   Time of discovery   (or onset of symptoms) 04/04/22   1900   Head CT read by Stroke Neuro Dr/Provider 04/05/22 2124   Was stroke code de-escalated? Yes 04/05/22 2126

## 2022-04-06 NOTE — PHARMACY-ADMISSION MEDICATION HISTORY
Admission Medication History Completed by Pharmacy    See [facility] Admission Navigator for allergy information, preferred outpatient pharmacy, prior to admission medications and immunization status.     Medication History Sources:     Patient's daughter Vishal (manages medications)     Dispense fill data     Changes made to PTA medication list (reason):    None    Additional Information:    Daughter notes the following:   o Pt can only take brand name Synthroid (not generic levothyroxine) due to restless legs with generic formulation   o Usage of clotrimazole and terbinafine creams varies, usually only when pt has rashes but currently only using Voltaren gel 2-3 times / week after patient showers     Prior to Admission medications    Medication Sig Last Dose Taking? Auth Provider   atorvastatin (LIPITOR) 40 MG tablet Take 1 tablet (40 mg) by mouth daily 4/5/2022 at Unknown time Yes Bony Castaneda MD   carvedilol (COREG) 6.25 MG tablet 1 tablet (6.25 mg) by Oral or Feeding Tube route 2 times daily (with meals) 4/5/2022 at Unknown time Yes Bony Castaneda MD   clotrimazole (LOTRIMIN) 1 % external cream Apply topically 2 times daily Until rash resolved Past Month at Unknown time Yes Bony Castaneda MD   cyanocobalamin (VITAMIN B-12) 1000 MCG tablet Take 1 tablet (1,000 mcg) by mouth daily 4/5/2022 at Unknown time Yes Bony Castaneda MD   diclofenac (VOLTAREN) 1 % topical gel Apply 2 g topically 4 times daily as needed for moderate pain Past Week at Unknown time Yes Bony Castaneda MD   doxazosin (CARDURA) 1 MG tablet Take 1 tablet (1 mg) by mouth At Bedtime Past Week at Unknown time Yes Bony Castaneda MD   DULoxetine (CYMBALTA) 20 MG capsule Take 1 capsule (20 mg) by mouth daily 4/5/2022 at Unknown time Yes Bony Castaneda MD   insulin glargine (LANTUS PEN) 100 UNIT/ML pen Please inject 18 units at bedtime change of dose Past Week at Unknown time Yes Bony Castaneda MD   insulin  lispro (HUMALOG) 100 UNIT/ML Cartridge Take prior to meal with sliding scale per glucose level adjust as noted in notes usual dose around 20 units daily,1 unit to max 13 units Prior to meals taking 2 meals daily,1 unit with food premeal plus additional 140-169 (3)170 -199 (4) 200-229(5) 230-259(6) 260-289( 7) 290-319(8) 320-349(9) 350-379 (10) 380-409(11) greater 410(12)y 4/5/2022 at Unknown time Yes Bony Castaneda MD   levETIRAcetam (KEPPRA) 500 MG tablet Take 1 tablet (500 mg) by mouth 2 times daily 4/5/2022 at Unknown time Yes Bony Castaneda MD   loratadine (CLARITIN) 10 mg tablet [LORATADINE (CLARITIN) 10 MG TABLET] Take 10 mg by mouth daily. Past Week at Unknown time Yes Provider, Historical   melatonin 10 mg Tab [MELATONIN 10 MG TAB] Take 10 mg by mouth daily. Past Week at Unknown time Yes Provider, Historical   NIFEdipine ER (ADALAT CC) 60 MG 24 hr tablet Take 1 tablet (60 mg) by mouth daily 4/5/2022 at Unknown time Yes Bony Castaneda MD   pregabalin (LYRICA) 75 MG capsule TAKE 1 CAPSULE(75 MG) BY MOUTH TWICE DAILY 4/5/2022 at Unknown time Yes Bony Castaneda MD   SYNTHROID 88 MCG tablet Take 1 tablet (88 mcg) by mouth daily 4/5/2022 at Unknown time Yes Bony Castaneda MD   terbinafine (LAMISIL) 1 % external cream Apply topically to gume of rash twice daily if needed Past Month at Unknown time Yes Bony Castaneda MD   Vitamin D3 (CHOLECALCIFEROL) 25 mcg (1000 units) tablet Take 1 tablet (25 mcg) by mouth daily 4/5/2022 at Unknown time Yes Bony Castaneda MD   ASPIRIN NOT PRESCRIBED (INTENTIONAL) Please choose reason not prescribed from choices below.   Bony Castaneda MD   blood glucose (NO BRAND SPECIFIED) test strip Use to test blood sugar 4-5 times daily or as directed.   Bony Castaneda MD   insulin pen needle (31G X 8 MM) 31G X 8 MM miscellaneous Use 4 pen needles daily or as directed.   Bony Castaneda MD       Date completed: 04/06/22    Medication  history completed by: HAYDEE BATRES, [facility]

## 2022-04-06 NOTE — ED TRIAGE NOTES
Pt brought in by family due to possible stroke like symptoms. Pt at 2030 with left visual neglect and left lower extremity neglect. Denies blood thinners.

## 2022-04-06 NOTE — ED NOTES
Emergency Department Patient Sign-out       Brief HPI and ED course:  Patient is a 61 year old female signed out to me by Dr. Garcia.  See initial ED Provider note for details of the presentation. In brief, patient with gait change 1 day ago, also some left vision change. Leaning to left just prior to arrival. Stroke code activated. CT unremarkable. Troponin mildly elevated, 2nd planned.     Vitals:   Patient Vitals for the past 24 hrs:   BP Temp Temp src Pulse Resp SpO2   04/05/22 2130 124/63 -- -- 85 14 97 %   04/05/22 2120 120/68 -- -- 89 17 99 %   04/05/22 2115 109/61 -- -- 87 16 98 %   04/05/22 2102 109/89 98.2  F (36.8  C) Oral 99 16 99 %       Received Sign-out Plan:    Pending studies include: 2nd troponin, MRI brain, UA      Plan:   - f/u above studies  - admit to neuro vs medicine depending on findings of the work-up    Events after assuming care:  2nd troponin stable.     MRI showed small acute frontoparietal infarct. Stroke neurology recommended admission to their service.     Patient admitted to neuro.      --  Abdi Luu MD   Emergency Medicine       Abdi Luu MD  04/06/22 0053

## 2022-04-06 NOTE — PROGRESS NOTES
04/06/22 1100   Quick Adds   Type of Visit Initial Occupational Therapy Evaluation   Living Environment   People in Home child(tiffany), adult   Current Living Arrangements apartment   Home Accessibility no concerns   Transportation Anticipated family or friend will provide   Living Environment Comments Pt lives in apartment with her daughter. Pt has a walk in shower with a built in seat. Would like to get grab bars.    Self-Care   Usual Activity Tolerance moderate   Current Activity Tolerance fair   Regular Exercise No   Equipment Currently Used at Home walker, rolling   Fall history within last six months yes   Number of times patient has fallen within last six months 6   General Information   Onset of Illness/Injury or Date of Surgery 04/05/22   Referring Physician Zoltan Albert MD   Patient/Family Therapy Goal Statement (OT) Pt would like to return home.    Additional Occupational Profile Info/Pertinent History of Current Problem Isabell Matthews is a 61 year old female with history of putaminal ICH, previous ischemic strokes, vascular dementia, seizure disorder (on Keppra 500 BID),  HTN, HLD, CAD s/p PCI 2004, CKD, T2DM, and hypothyroidism who presents with daughter for evaluation of 1 day of difficultly walking and 45 mins of L eye vision loss. The patient was reportedly in her normal state of health until 4/4/22 at ~7 PM when she began to have some increased difficultly walking with veering to the left. These symptoms progressed and the patient had worsening difficultly walking and could not see out of her left eye which prompted the daughter to bring the patient to the hospital. On arrival, a stroke code was called at 8283. On evaluation, the patient reported that the vision loss was blurriness and that she was able to see in all 4 quadrants still. Unclear if the vision loss was more severe prior as the patient is somewhat confused. CTH/CTA obtained and without acute changes. Stroke code was deescalated at  2126. No TNK or thrombectomy due to >24 hrs of symptoms and no LVO. Subsequent MRI brain showed a punctate R frontoparietal infarct.   Existing Precautions/Restrictions fall   Left Upper Extremity (Weight-bearing Status) full weight-bearing (FWB)   Right Upper Extremity (Weight-bearing Status) full weight-bearing (FWB)   Left Lower Extremity (Weight-bearing Status) full weight-bearing (FWB)   Right Lower Extremity (Weight-bearing Status) full weight-bearing (FWB)   Cognitive Status Examination   Orientation Status person   Affect/Mental Status (Cognitive) confused;low arousal/lethargic   Follows Commands delayed response/completion;physical/tactile prompts required   Visual Perception   Visual Impairment/Limitations blurry vision   Impact of Vision Impairment on Function (Vision) Pt reports some blurry vision. Pt able to read clock without difficulty at 10ft.    Sensory   Sensory Comments Pt reports no changes in sensation.    Pain Assessment   Patient Currently in Pain Yes, see Vital Sign flowsheet  (left shoulder.)   Integumentary/Edema   Integumentary/Edema no deficits were identifed   Posture   Posture Comments Posterior left lateral lean.    Range of Motion Comprehensive   Comment, General Range of Motion RUE AROM WFL. LUE 0-~80 degrees humeral flexion limited by pain/weakness.    Strength Comprehensive (MMT)   Comment, General Manual Muscle Testing (MMT) Assessment RUE grossly 3+/5 humeral flexion, LUE grossly 2/5 humeral flexion. Mild deficit in  strength.    Bed Mobility   Comment (Bed Mobility) Min A and vc's.    Transfers   Transfer Comments Mod A and vc's supine<->seated EOB. Min-Mod A and vc's with FWW for sit<->standing ambulation.   Activities of Daily Living   BADL Assessment/Intervention upper body dressing;lower body dressing;grooming;toileting   Upper Body Dressing Assessment/Training   [place] Level (Upper Body Dressing) set up;verbal cues;moderate assist (50% patient effort)   Lower Body  Dressing Assessment/Training   [place] Level (Lower Body Dressing) set up;verbal cues;moderate assist (50% patient effort)   Grooming Assessment/Training   [place] Level (Grooming) set up;verbal cues;moderate assist (50% patient effort)   Toileting   [place] Level (Toileting) set up;verbal cues;moderate assist (50% patient effort)   Clinical Impression   Criteria for Skilled Therapeutic Interventions Met (OT) Yes, treatment indicated   OT Diagnosis Decreased independence with functional transfers and ADLS/IADLS.    OT Problem List-Impairments impacting ADL problems related to;activity tolerance impaired;balance;cognition;coordination;flexibility;mobility;range of motion (ROM);strength;pain;postural control;vision   Assessment of Occupational Performance 5 or more Performance Deficits   Identified Performance Deficits Decreased independence with functional transfers and ADLS/IADLS.    Planned Therapy Interventions (OT) ADL retraining;IADL retraining;bed mobility training;ROM;strengthening;stretching;transfer training;home program guidelines;progressive activity/exercise   Clinical Decision Making Complexity (OT) low complexity   Anticipated Equipment Needs Upon Discharge (OT) walker, standard;other (see comments)  (grab bars)   Risk & Benefits of therapy have been explained evaluation/treatment results reviewed;care plan/treatment goals reviewed;patient   OT Discharge Planning   OT Discharge Recommendation (DC Rec) Transitional Care Facility;home with assist;home with home care occupational therapy   OT Rationale for DC Rec Pt is below baseline function at this time. Pt will benefit from continued therapy to regain and maximize functional independence and safety.    OT Brief overview of current status Min-Mod A x 1, FWW.    Total Evaluation Time (Minutes)   Total Evaluation Time (Minutes) 10

## 2022-04-07 ENCOUNTER — APPOINTMENT (OUTPATIENT)
Dept: SPEECH THERAPY | Facility: CLINIC | Age: 62
DRG: 066 | End: 2022-04-07
Payer: MEDICARE

## 2022-04-07 ENCOUNTER — APPOINTMENT (OUTPATIENT)
Dept: OCCUPATIONAL THERAPY | Facility: CLINIC | Age: 62
DRG: 066 | End: 2022-04-07
Payer: MEDICARE

## 2022-04-07 VITALS
WEIGHT: 139.55 LBS | HEIGHT: 63 IN | RESPIRATION RATE: 16 BRPM | TEMPERATURE: 98 F | BODY MASS INDEX: 24.73 KG/M2 | HEART RATE: 95 BPM | OXYGEN SATURATION: 97 % | DIASTOLIC BLOOD PRESSURE: 86 MMHG | SYSTOLIC BLOOD PRESSURE: 136 MMHG

## 2022-04-07 LAB
ANION GAP SERPL CALCULATED.3IONS-SCNC: 10 MMOL/L (ref 3–14)
BUN SERPL-MCNC: 22 MG/DL (ref 7–30)
CALCIUM SERPL-MCNC: 8.7 MG/DL (ref 8.5–10.1)
CHLORIDE BLD-SCNC: 106 MMOL/L (ref 94–109)
CO2 SERPL-SCNC: 22 MMOL/L (ref 20–32)
CREAT SERPL-MCNC: 1.45 MG/DL (ref 0.52–1.04)
ERYTHROCYTE [DISTWIDTH] IN BLOOD BY AUTOMATED COUNT: 13.4 % (ref 10–15)
GFR SERPL CREATININE-BSD FRML MDRD: 40 ML/MIN/1.73M2
GLUCOSE BLD-MCNC: 344 MG/DL (ref 70–99)
GLUCOSE BLDC GLUCOMTR-MCNC: 267 MG/DL (ref 70–99)
GLUCOSE BLDC GLUCOMTR-MCNC: 341 MG/DL (ref 70–99)
HCT VFR BLD AUTO: 35.6 % (ref 35–47)
HGB BLD-MCNC: 11.1 G/DL (ref 11.7–15.7)
MAGNESIUM SERPL-MCNC: 1.9 MG/DL (ref 1.6–2.3)
MCH RBC QN AUTO: 28 PG (ref 26.5–33)
MCHC RBC AUTO-ENTMCNC: 31.2 G/DL (ref 31.5–36.5)
MCV RBC AUTO: 90 FL (ref 78–100)
PHOSPHATE SERPL-MCNC: 3.1 MG/DL (ref 2.5–4.5)
PLATELET # BLD AUTO: 183 10E3/UL (ref 150–450)
POTASSIUM BLD-SCNC: 3.8 MMOL/L (ref 3.4–5.3)
RBC # BLD AUTO: 3.96 10E6/UL (ref 3.8–5.2)
SODIUM SERPL-SCNC: 138 MMOL/L (ref 133–144)
WBC # BLD AUTO: 5.1 10E3/UL (ref 4–11)

## 2022-04-07 PROCEDURE — 250N000011 HC RX IP 250 OP 636: Performed by: STUDENT IN AN ORGANIZED HEALTH CARE EDUCATION/TRAINING PROGRAM

## 2022-04-07 PROCEDURE — 250N000013 HC RX MED GY IP 250 OP 250 PS 637: Performed by: STUDENT IN AN ORGANIZED HEALTH CARE EDUCATION/TRAINING PROGRAM

## 2022-04-07 PROCEDURE — 99239 HOSP IP/OBS DSCHRG MGMT >30: CPT | Mod: GC | Performed by: PSYCHIATRY & NEUROLOGY

## 2022-04-07 PROCEDURE — 97535 SELF CARE MNGMENT TRAINING: CPT | Mod: GO

## 2022-04-07 PROCEDURE — 83735 ASSAY OF MAGNESIUM: CPT | Performed by: PSYCHIATRY & NEUROLOGY

## 2022-04-07 PROCEDURE — 80048 BASIC METABOLIC PNL TOTAL CA: CPT | Performed by: STUDENT IN AN ORGANIZED HEALTH CARE EDUCATION/TRAINING PROGRAM

## 2022-04-07 PROCEDURE — 85027 COMPLETE CBC AUTOMATED: CPT | Performed by: STUDENT IN AN ORGANIZED HEALTH CARE EDUCATION/TRAINING PROGRAM

## 2022-04-07 PROCEDURE — 84100 ASSAY OF PHOSPHORUS: CPT | Performed by: PSYCHIATRY & NEUROLOGY

## 2022-04-07 PROCEDURE — 92526 ORAL FUNCTION THERAPY: CPT | Mod: GN

## 2022-04-07 PROCEDURE — 36415 COLL VENOUS BLD VENIPUNCTURE: CPT | Performed by: STUDENT IN AN ORGANIZED HEALTH CARE EDUCATION/TRAINING PROGRAM

## 2022-04-07 RX ORDER — CARVEDILOL 6.25 MG/1
6.25 TABLET ORAL 2 TIMES DAILY WITH MEALS
Status: DISCONTINUED | OUTPATIENT
Start: 2022-04-07 | End: 2022-04-07 | Stop reason: HOSPADM

## 2022-04-07 RX ORDER — ASPIRIN 81 MG/1
81 TABLET, CHEWABLE ORAL DAILY
Qty: 30 TABLET | Refills: 0 | Status: SHIPPED | OUTPATIENT
Start: 2022-04-08 | End: 2023-05-21

## 2022-04-07 RX ADMIN — LEVOTHYROXINE SODIUM 88 MCG: 88 TABLET ORAL at 08:43

## 2022-04-07 RX ADMIN — INSULIN ASPART 3 UNITS: 100 INJECTION, SOLUTION INTRAVENOUS; SUBCUTANEOUS at 12:09

## 2022-04-07 RX ADMIN — ATORVASTATIN CALCIUM 40 MG: 40 TABLET, FILM COATED ORAL at 08:43

## 2022-04-07 RX ADMIN — DULOXETINE HYDROCHLORIDE 20 MG: 20 CAPSULE, DELAYED RELEASE ORAL at 08:42

## 2022-04-07 RX ADMIN — PREGABALIN 75 MG: 75 CAPSULE ORAL at 08:42

## 2022-04-07 RX ADMIN — Medication 25 MCG: at 08:42

## 2022-04-07 RX ADMIN — INSULIN ASPART 5 UNITS: 100 INJECTION, SOLUTION INTRAVENOUS; SUBCUTANEOUS at 08:57

## 2022-04-07 RX ADMIN — LEVETIRACETAM 500 MG: 500 TABLET, FILM COATED ORAL at 08:43

## 2022-04-07 RX ADMIN — ENOXAPARIN SODIUM 40 MG: 40 INJECTION SUBCUTANEOUS at 05:32

## 2022-04-07 RX ADMIN — CYANOCOBALAMIN TAB 500 MCG 1000 MCG: 500 TAB at 08:43

## 2022-04-07 RX ADMIN — ASPIRIN 81 MG: 81 TABLET, CHEWABLE ORAL at 08:43

## 2022-04-07 ASSESSMENT — ACTIVITIES OF DAILY LIVING (ADL)
ADLS_ACUITY_SCORE: 20
ADLS_ACUITY_SCORE: 18
ADLS_ACUITY_SCORE: 20
ADLS_ACUITY_SCORE: 18
ADLS_ACUITY_SCORE: 20

## 2022-04-07 ASSESSMENT — PATIENT HEALTH QUESTIONNAIRE - PHQ9: SUM OF ALL RESPONSES TO PHQ QUESTIONS 1-9: 0

## 2022-04-07 NOTE — PLAN OF CARE
Neuro: A&Ox3/2.    Cardiac: NSR. VSS.   Respiratory: Sating upper 90's on RA.  GI/: Adequate urine output. BM X1  Diet/appetite: Tolerating minced and moist diet. Eating well.  Activity:  X2 Assist with walker.  Pain: At acceptable level on current regimen.   Skin: No new deficits noted.  LDA's: Peripheral IV.    Plan: Continue with POC. Notify primary team with changes.

## 2022-04-07 NOTE — PROGRESS NOTES
DISCHARGE                         No discharge date for patient encounter.  ----------------------------------------------------------------------------  Discharged to: Home  Via: private transportation  Accompanied by: Family  Discharge Instructions: minced and moist diet with thin liquids, regular activity, medications, follow up appointments, when to call the MD, aftercare instructions.  Prescriptions: To be filled by [place] discharge pharmacy; medication list reviewed & sent with pt  Follow Up Appointments: arranged; information given  Belongings: All sent with pt  IV: d/c'd  Telemetry: d/c'd  Pt's daughter Mel exhibits understanding of above discharge instructions; all questions answered.    Discharge Paperwork: Signed, copied, and sent home with patient and daughter.

## 2022-04-07 NOTE — DISCHARGE SUMMARY
[facility]    Neurology Stroke Discharge Summary    Date of Admission: 4/5/2022  Date of Discharge: 04/07/2022    Disposition: Discharged to home with home care  Primary Care Physician: Bony Castaneda      Admission Diagnosis:   Acute R frontoparietal punctate infarct    Discharge Diagnosis:   #Acute ischemic stroke of right frontopperital punctate stroke due to small-vessel occlusion   #Seizure disorder  #T2DM  #CKD3  #HTN  #HLD  #CAD s/p PCI  #Hypothyroidism  #Vascular dementia     Problem Leading to Hospitalization (from [facility]):     Presented for evaluation of 1 day of difficultly walking and 45 mins of L eye vision loss.    Please see H&P dated 4/5/2022 for further details about presentation.    Brief Hospital Course:   Patient presented with 1 day of difficultly walking and 45 mins of L eye vision loss..      Found to have right frontopperital punctate stroke     IV thrombolysis was not given  due to unclear or unfavorable risk-benefit profile for extended window thrombolysis beyond the conventional 4.5 hour time window.     Work-up as stated below under Pertinent Investigations.    Etiology is thought to be small vessel disease.      Rehab evaluation: OT, PT and SLP.     Smoking Cessation: patient is not a smoker    BP Long-term Goal: 140/90 or less    Antithrombotic/Anticoagulant Agent: aspirin 81 mg    Statins: Prior to admission statin dose not changed       Hgb A1C Goal: < 7.0    Complications: None.     Other problems addressed during this hospitalization:    #Seizure disorder  - Continued PTA Keppra 500 mg BID     #T2DM  - Medium Dose Sliding Scale Insulin      #CKD3  - Daily BMP    #HTN  - Holding PTA coreg and nifedipine d/t borderline hypotension on arrival     #HLD  - PTA atrova 40     #CAD s/p PCI  - Holding PTA coreg and nifedipine d/t borderline hypotension on arrival     #Hypothyroidism  - Continue PTA synthroid     #Vascular dementia  -  Delirium precautions    PERTINENT INVESTIGATIONS    Labs  Lipid Panel: Recent Labs   Lab Test 04/06/22  0014   CHOL 99   HDL 34*   LDL 39   TRIG 129     A1C:   Lab Results   Component Value Date    A1C 8.8 04/05/2022    A1C 7.8 06/21/2021     INR:   Recent Labs   Lab 04/05/22  2115   INR 1.02      Coag Panel / Hypercoag Workup: Not indicated  Pending test results: none    Echo:   Global and regional left ventricular function is normal with an EF of 55-60%.  Global right ventricular function is normal. The right ventricle is normal  size.  No significant valvular abnormalities.  IVC diameter <2.1 cm collapsing >50% with sniff suggests a normal RA pressure  of 3 mmHg.  There is no prior study for direct comparison.    Imaging:  CT head:  No acute intracranial pathology.   Unchanged chronic findings of chronic lacunar infarction in the right  basal ganglia and left external capsule. Advanced cortical cerebral  and cerebellar volume loss is more than expected for age.  2. ASPECT Score: 10/10.     CTA head and neck:  1. Head CTA demonstrates multifocal different levels of stenoses of  middle and posterior cerebral artery distal segments. The etiology is  probable atherosclerotic disease. No definite occlusive thrombus is  identified.      2. Neck CTA demonstrates minimal atherosclerotic calcifications of the  left greater than right carotid bulbs.       Endovascular procedure: None     Cardiac Monitoring: Patient had > 24 hrs of cardiac monitor while in hospital.    Findings: No atrial fibrillation was found.    Sleep Apnea Screen:   Questions/Answers      1. Prior to your stroke, have you been told that you snore? Yes.    2. Prior to your stroke, have you been told that you struggle to breath while you are sleeping? No.    3. Prior to your stroke, do you feel tired and sleepy even after getting a normal night of sleep? No.    Sleep Apnea Screen Findings: Patient has 0-1 symptoms of sleep apnea.  Further sleep study is  not recommended at this time.    PHQ-9 Depression Screen Score: 0    Education discussed with: patient on blood pressure management, cholesterol management, medical management, diet modification, exercise recommendation and follow-up recommendations/plan.    During daily rounds, the plan of care was discussed and developed with patient.  Plan of care includes: BP control, sugar control, test results. prognosis. .    PHYSICAL EXAMINATION  Vital Signs:  B/P: 136/86, T: 98, P: 95, R: 16    General: Awake and alert in NAD   HEENT: Normocephalic, atraumatic, no epistaxis, no oral lesions  Resp: Breathing comfortably on room air  Extremities: Warm and well perfused, peripheral pulses present, no edema  Skin: Not jaundiced, no rash, no ecchymoses     Neuro:  Mental status: Awake, alert, attentive; oriented to person, place, and time. Thought the current president was siani. Could not count down from 20 but could count down from 10.   Cranial nerves: Visual fields intact in both eyes, Eyes conjugate, PERRL, EOMI w/ normal and smooth pursuit, face expression symmetric, facial sensation intact to light touch and symmetric, hearing intact to conversation, shoulder shrug strong  Motor: Tone normal. Able to hold up bilateral upper extremities for 10s and bilateral lower for 5s without drift. No abnormal movements noted. Pronator drift absent.   Sensory: Intact to light touch in all 4 extremities  Coordination: FNF intact bilaterally with no dysmetria; Normal heel-shin test bilaterally with no dysmetria noted  Gait:  Deferred  National Institutes of Health Stroke Scale (on day of discharge)  NIHSS Total Score: 2        Medications    Current Discharge Medication List      START taking these medications    Details   aspirin (ASA) 81 MG chewable tablet Take 1 tablet (81 mg) by mouth daily  Qty: 30 tablet, Refills: 0    Associated Diagnoses: Acute CVA (cerebrovascular accident) (H)         CONTINUE these medications which have NOT  CHANGED    Details   atorvastatin (LIPITOR) 40 MG tablet Take 1 tablet (40 mg) by mouth daily  Qty: 90 tablet, Refills: 3    Associated Diagnoses: Hyperlipidemia LDL goal <70      carvedilol (COREG) 6.25 MG tablet 1 tablet (6.25 mg) by Oral or Feeding Tube route 2 times daily (with meals)  Qty: 180 tablet, Refills: 3    Associated Diagnoses: Left-sided nontraumatic intracerebral hemorrhage, unspecified cerebral location (H); Benign essential hypertension      clotrimazole (LOTRIMIN) 1 % external cream Apply topically 2 times daily Until rash resolved  Qty: 60 g, Refills: 3    Associated Diagnoses: Fungal dermatitis      cyanocobalamin (VITAMIN B-12) 1000 MCG tablet Take 1 tablet (1,000 mcg) by mouth daily  Qty: 100 tablet, Refills: 3    Associated Diagnoses: Vitamin B12 deficiency (non anemic)      diclofenac (VOLTAREN) 1 % topical gel Apply 2 g topically 4 times daily as needed for moderate pain  Qty: 150 g, Refills: 1    Associated Diagnoses: Pain in joint, ankle and foot, right      doxazosin (CARDURA) 1 MG tablet Take 1 tablet (1 mg) by mouth At Bedtime  Qty: 90 tablet, Refills: 3    Associated Diagnoses: Left-sided nontraumatic intracerebral hemorrhage, unspecified cerebral location (H); Benign essential hypertension      DULoxetine (CYMBALTA) 20 MG capsule Take 1 capsule (20 mg) by mouth daily  Qty: 90 capsule, Refills: 1    Associated Diagnoses: Fibromyalgia      insulin glargine (LANTUS PEN) 100 UNIT/ML pen Please inject 18 units at bedtime change of dose  Qty: 30 mL, Refills: 4    Comments: If Lantus is not covered by insurance, may substitute Basaglar at same dose and frequency check with patient if supplies needed prior to fill  Associated Diagnoses: Type 1 diabetes mellitus with other circulatory complication (H); History of insulin dependent diabetes mellitus      insulin lispro (HUMALOG) 100 UNIT/ML Cartridge Take prior to meal with sliding scale per glucose level adjust as noted in notes usual dose  around 20 units daily,1 unit to max 13 units Prior to meals taking 2 meals daily,1 unit with food premeal plus additional 140-169 (3)170 -199 (4) 200-229(5) 230-259(6) 260-289( 7) 290-319(8) 320-349(9) 350-379 (10) 380-409(11) greater 410(12)y  Qty: 9 mL, Refills: 4    Associated Diagnoses: Type 1 diabetes mellitus with other circulatory complication (H)      levETIRAcetam (KEPPRA) 500 MG tablet Take 1 tablet (500 mg) by mouth 2 times daily  Qty: 186 tablet, Refills: 3    Associated Diagnoses: Seizures (H)      loratadine (CLARITIN) 10 mg tablet [LORATADINE (CLARITIN) 10 MG TABLET] Take 10 mg by mouth daily.      melatonin 10 mg Tab [MELATONIN 10 MG TAB] Take 10 mg by mouth daily.      NIFEdipine ER (ADALAT CC) 60 MG 24 hr tablet Take 1 tablet (60 mg) by mouth daily  Qty: 90 tablet, Refills: 1    Associated Diagnoses: Benign essential hypertension      pregabalin (LYRICA) 75 MG capsule TAKE 1 CAPSULE(75 MG) BY MOUTH TWICE DAILY  Qty: 60 capsule, Refills: 1    Associated Diagnoses: Neuropathy; Fibromyalgia      SYNTHROID 88 MCG tablet Take 1 tablet (88 mcg) by mouth daily  Qty: 90 tablet, Refills: 3    Associated Diagnoses: Acquired hypothyroidism      terbinafine (LAMISIL) 1 % external cream Apply topically to gume of rash twice daily if needed  Qty: 42 g, Refills: 1    Associated Diagnoses: Intertrigo labialis      Vitamin D3 (CHOLECALCIFEROL) 25 mcg (1000 units) tablet Take 1 tablet (25 mcg) by mouth daily  Qty: 100 tablet, Refills: 3    Associated Diagnoses: Benign essential hypertension      blood glucose (NO BRAND SPECIFIED) test strip Use to test blood sugar 4-5 times daily or as directed.  Qty: 400 strip, Refills: 3    Associated Diagnoses: Type 1 diabetes mellitus with other circulatory complication (H)      insulin pen needle (31G X 8 MM) 31G X 8 MM miscellaneous Use 4 pen needles daily or as directed.  Qty: 300 each, Refills: 4    Associated Diagnoses: History of insulin dependent diabetes mellitus          STOP taking these medications       ASPIRIN NOT PRESCRIBED (INTENTIONAL) Comments:   Reason for Stopping:               Additional recommendations and follow up:       Home Care Referral      Reason for your hospital stay    You had a Acute R frontoparietal punctate stroke for which you were treated in the hospital and you are being discharged in na stable condition.     Activity    Your activity upon discharge: activity as tolerated     Follow Up and recommended labs and tests    Follow up with primary care provider, Bony Castaneda, within 7 days for hospital follow- up.  No follow up labs or test are needed.    Follow up with me,  Stroke neurology within 4-6 weeks  for hospital follow- up.  No follow up labs or test are needed.     Diet    Follow this diet upon discharge: Orders Placed This Encounter      Minced & Moist Diet (level 5) Thin Liquids (level 0)       Patient was seen and discussed with the Attending, Dr. Soto.    Julio C Hay MD   Neurology PGY1  ASCOM: [id number]

## 2022-04-07 NOTE — PROGRESS NOTES
[facility]    Stroke Progress Note    Interval EventsNo acute events overnight. The patient did not endorse any complaints. She was more awake this morning and was oriented to time place and person.   -- Restarting PTA Coreg due to increased blood pressures overnight.    HPI Summary  Isabell Matthews is a 61 year old female with history of putaminal ICH, previous ischemic strokes, vascular dementia, seizure disorder (on Keppra 500 BID),  HTN, HLD, CAD s/p PCI 2004, CKD, T2DM, and hypothyroidism who presents with daughter for evaluation of 1 day of difficultly walking and 45 mins of L eye vision loss. The patient was reportedly in her normal state of health until 4/4/22 at ~7 PM when she began to have some increased difficultly walking with veering to the left. These symptoms progressed and the patient had worsening difficultly walking and could not see out of her left eye which prompted the daughter to bring the patient to the hospital. On arrival, a stroke code was called at 0307. On evaluation, the patient reported that the vision loss was blurriness and that she was able to see in all 4 quadrants still. Unclear if the vision loss was more severe prior as the patient is somewhat confused. CTH/CTA obtained and without acute changes. Stroke code was deescalated at 2126. No TNK or thrombectomy due to >24 hrs of symptoms and no LVO. Subsequent MRI brain showed a punctate R frontoparietal infarct. Patient does not take any blood thinning medications, ASA or Plavix. No recent infections or illnesses per daughter. Patient reports no focal weakness or numbness.               Stroke Evaluation Summarized    MRI/Head CT MRI  1.  Right frontoparietal high convexity within the sensorimotor region punctate acute infarct.   2.  No additional acute infarcts.  3.  Multiple chronic infarcts described above.  4.  Age related changes described above.  5.  Chronic microhemorrhages  described above.  CTH:  No acute intracranial pathology.   Unchanged chronic findings of chronic lacunar infarction in the right  basal ganglia and left external capsule. Advanced cortical cerebral  and cerebellar volume loss is more than expected for age.2. ASPECT Score: 10/10.   Intracranial Vasculature CTA:   1. Head CTA demonstrates multifocal different levels of stenoses of  middle and posterior cerebral artery distal segments. The etiology is  probable atherosclerotic disease. No definite occlusive thrombus is  identified.      2. Neck CTA demonstrates minimal atherosclerotic calcifications of the  left greater than right carotid bulbs.     Echocardiogram Global and regional left ventricular function is normal with an EF of 55-60%.  Global right ventricular function is normal. The right ventricle is normal  size.  No significant valvular abnormalities.  IVC diameter <2.1 cm collapsing >50% with sniff suggests a normal RA pressure  of 3 mmHg.  There is no prior study for direct comparison.     LDL  4/6/2022: 39 mg/dL   A1C  4/5/2022: 8.8 %   Troponin No lab value available in past 48 hrs       Impression   61 year old female with history of putaminal ICH, previous ischemic strokes, vascular dementia, seizure disorder (on Keppra 500 BID),  HTN, HLD, CAD s/p PCI 2004, CKD, T2DM, and hypothyroidism who presents with daughter for evaluation of 1 day of difficultly walking and 45 mins of L eye vision loss. Exam non-focal with NIHSS 2 for chronic L facial droop and month. CT/CTA unremarkable. MRI brain with possible punctate R frontoparietal infarct (unclear if ADC changes on imaging). For now, plan to admit the patient for stroke work-up.      Plan  #Acute R frontoparietal punctate infarct  #History of L putaminal ICH  - Avoid hypotonic IV fluids  - Daily aspirin 81 mg for secondary stroke prevention  - Statin: Cont PTA atorva 40, pending LDL, goal LDL 40-70  - Patient is >24 hours out from onset of symptoms,  therefore goal normotension  - TTE with Bubble Study  - Telemetry, EKG  - Bedside Glucose Monitoring  - Nutrition: NPO, pending bedside swallow  - A1c, Lipid Panel, Troponin x 3  - PT/OT/SLP  - PM&R  - Stroke Education  - Depression Screen  - Apnea Screen  - Euthermia, Euglycemia      #Seizure disorder  - Continue PTA Keppra 500 mg BID     #T2DM  - MDSSI      #CKD3  - Daily BMP    #HTN  - Restarting PTA Coreg  - Holding PTA nifedipine      #HLD  - PTA atrova 40     #CAD s/p PCI  - Holding PTA coreg and nifedipine d/t borderline hypotension on arrival     #Hypothyroidism  - Continue PTA synthroid     #Vascular dementia  - Delirium precautions     Prophylaxis            For VTE Prevention:  - enoxaparin     For Acid Suppression:  - GI prophylaxis is not indicated     Code Status  Full Code for tonight, patient wishes to discuss further in the AM with her daughter present.  DISPO: work up complete the patient will be discharged home with home healthcare tomorrow if possible. Following closely with social work.       During initial physical assessment, the plan of care was discussed and developed with patient and child.  Plan of care includes: the above.     Patient was admitted via LTAC, located within [facility] ([place])     The patient was admitted to the stroke team.      Patient discussed over the phone with stroke staff is Dr. Charles Hay MD  Neurology PGY1  ASCOM: [id number]  ______________________________________________________    Clinically Significant Risk Factors Present on Admission                 Medications   Scheduled Meds    aspirin  81 mg Oral Daily     atorvastatin  40 mg Oral Daily     cyanocobalamin  1,000 mcg Oral Daily     doxazosin  1 mg Oral At Bedtime     DULoxetine  20 mg Oral Daily     enoxaparin ANTICOAGULANT  40 mg Subcutaneous Q24H     insulin aspart  1-7 Units Subcutaneous TID AC     insulin aspart  1-5 Units Subcutaneous At Bedtime     levETIRAcetam  500 mg Oral BID      levothyroxine  88 mcg Oral Daily     pregabalin  75 mg Oral BID     sodium chloride (PF)  3 mL Intracatheter Q8H     Vitamin D3  25 mcg Oral Daily       Infusion Meds    - MEDICATION INSTRUCTIONS -         PRN Meds  glucose **OR** dextrose **OR** glucagon, labetalol **OR** hydrALAZINE, lidocaine 4%, lidocaine (buffered or not buffered), - MEDICATION INSTRUCTIONS -, sodium chloride (PF)       PHYSICAL EXAMINATION  Temp:  [98  F (36.7  C)-99.2  F (37.3  C)] 98  F (36.7  C)  Pulse:  [79-97] 95  Resp:  [16-18] 16  BP: (136-169)/(66-86) 136/86  Cuff Mean (mmHg):  [94] 94  SpO2:  [96 %-97 %] 97 %      General: Awake and alert in NAD   HEENT: Normocephalic, atraumatic, no epistaxis, no oral lesions  Resp: Breathing comfortably on room air  Extremities: Warm and well perfused, peripheral pulses present, no edema  Skin: Not jaundiced, no rash, no ecchymoses     Neuro:  Mental status: Awake, alert, attentive; oriented to person, place, and time. Thought the current president was [place]inai. Could not count down from 20 but could count down from 10.   Cranial nerves: Visual fields intact in both eyes, Eyes conjugate, PERRL, EOMI w/ normal and smooth pursuit, face expression symmetric, facial sensation intact to light touch and symmetric, hearing intact to conversation, shoulder shrug strong  Motor: Tone normal. Able to hold up bilateral upper extremities for 10s and bilateral lower for 5s without drift. No abnormal movements noted. Pronator drift absent.   Sensory: Intact to light touch in all 4 extremities  Coordination: FNF intact bilaterally with no dysmetria; Normal heel-shin test bilaterally with no dysmetria noted  Gait:  Deferred    Imaging  I personally reviewed all imaging; relevant findings per HPI.     Lab Results Data   CBC  Recent Labs   Lab 04/07/22  0529 04/05/22 2115   WBC 5.1 5.4   RBC 3.96 4.15   HGB 11.1* 11.7   HCT 35.6 36.7    212     Basic Metabolic Panel    Recent Labs   Lab 04/07/22  1208  04/07/22  0812 04/07/22  0529 04/06/22  0723 04/06/22  0407 04/06/22  0323 04/06/22  0014 04/05/22 2116   NA  --   --  138  --   --   --   --  139   POTASSIUM  --   --  3.8  --  3.9  --   --  3.8   CHLORIDE  --   --  106  --   --   --   --  106   CO2  --   --  22  --   --   --   --  26   BUN  --   --  22  --   --   --   --  25   CR  --   --  1.45*  --   --   --  1.20* 1.46*   * 341* 344*   < >  --    < >  --  240*   VERONICA  --   --  8.7  --   --   --   --  8.9    < > = values in this interval not displayed.     Liver Panel  No results for input(s): PROTTOTAL, ALBUMIN, BILITOTAL, ALKPHOS, AST, ALT, BILIDIRECT in the last 168 hours.  INR    Recent Labs   Lab Test 04/05/22 2115 06/21/21  1618 09/27/20  1601   INR 1.02 0.91 0.99      Lipid Profile    Recent Labs   Lab Test 04/06/22  0014 08/02/21  1009   CHOL 99 146   HDL 34* 39*   LDL 39 75   TRIG 129 158*     A1C    Recent Labs   Lab Test 04/05/22 2115 08/02/21  1009 06/21/21 2011   A1C 8.8* 8.2* 7.8*     Troponin I    Recent Labs   Lab 04/06/22  0407 04/06/22  0014 04/05/22 2116   TROPONINIS 44 41 56*

## 2022-04-07 NOTE — PROGRESS NOTES
Care Management Follow Up    Length of Stay (days): 1    Expected Discharge Date: 4/7     Concerns to be Addressed: Discharge planning.      Patient plan of care discussed at interdisciplinary rounds: Yes    Anticipated Discharge Disposition: Home  Anticipated Discharge Services:  PCA, home care  Anticipated Discharge DME: No new DME noted.     Education Provided on the Discharge Plan: Yes  Patient/Family in Agreement with the Plan:  Yes    Referrals Placed by CM/SW: Home care  Private pay costs discussed: Not applicable    Additional Information:  Per there primary team, patient is medically ready for discharge today. Patient and family prefer discharge to home w/resumption of PCA services and home care. Home care discussed, daughter voiced that she does not want to use [facility] again and would prefer any other agency. Patient referred to [facility] at this time, their start of care for nursing and therapy is 4/12. Awaiting call back from HC agency to confirm they can accept. Writer updated patient's daughter that the patient is medically ready and if able to secure home care she could discharge today, daughter is able to provide transportation. Care coordination will continue to follow for discharge planning.      [facility] (RN/PT/OT)  Phone: [phone] Addendum:  Home care confirmed, patient able to discharge to home today. Primary team, bedside nurse and patient's daughter updated.     Deann Eastman, RNCC, BSN    [facility]    [facility] [address]  [address] [place] [id number]    [email or web address]    Office: [phone] Pager: [phone]  To contact the weekend RNCC, page [phone].

## 2022-04-08 ENCOUNTER — PATIENT OUTREACH (OUTPATIENT)
Dept: CARE COORDINATION | Facility: CLINIC | Age: 62
End: 2022-04-08
Payer: MEDICARE

## 2022-04-08 DIAGNOSIS — Z71.89 OTHER SPECIFIED COUNSELING: ICD-10-CM

## 2022-04-08 NOTE — PLAN OF CARE
Physical Therapy Discharge Summary    Reason for therapy discharge:    Discharged to home with home therapy.    Progress towards therapy goal(s). See goals on Care Plan in [facility] electronic health record for goal details.  Goals partially met.  Barriers to achieving goals:   discharge from facility.    Therapy recommendation(s):    Continued therapy is recommended.  Rationale/Recommendations:  To further progress functional mobility and independence.

## 2022-04-08 NOTE — PLAN OF CARE
Speech Language Therapy Discharge Summary    Reason for therapy discharge:    Discharged to home.    Progress towards therapy goal(s). See goals on Care Plan in [facility] electronic health record for goal details.  Goals partially met.  Barriers to achieving goals:   discharge from facility.    Therapy recommendation(s):    Continued therapy is recommended.  Rationale/Recommendations:  for ongoing dysphagia treatment.     Pt discharging home on Minced and Moist and Thin liquid diet.

## 2022-04-08 NOTE — PROGRESS NOTES
Clinic Care Coordination Contact  [facility]/Voicemail       Clinical Data: Care Coordinator Outreach  Outreach attempted x 1.  Left message on patient's voicemail with call back information and requested return call.  Plan: Care Coordinator will try to reach patient again in 1-2 business days.    Beatris Reyes, [facility]  [phone]  [facility]

## 2022-04-08 NOTE — PLAN OF CARE
Occupational Therapy Discharge Summary    Reason for therapy discharge:    Discharged to home with home therapy.    Progress towards therapy goal(s). See goals on Care Plan in [facility] electronic health record for goal details.  Goals partially met.  Barriers to achieving goals:   discharge from facility.    Therapy recommendation(s):    Continued therapy is recommended.  Rationale/Recommendations:  Pt would benefit from continued therapy in TCU setting to maximize functional indepnedence.

## 2022-04-09 NOTE — PROGRESS NOTES
Clinic Care Coordination Contact  [facility]/Voicemail       Clinical Data: Care Coordinator Outreach  Outreach attempted x 2.  No answer,    Plan: Care Coordinator will do no further outreaches at this time.        URBANO Bull  [phone]  [facility]

## 2022-04-10 ENCOUNTER — APPOINTMENT (OUTPATIENT)
Dept: CT IMAGING | Facility: CLINIC | Age: 62
End: 2022-04-10
Attending: EMERGENCY MEDICINE
Payer: MEDICARE

## 2022-04-10 ENCOUNTER — APPOINTMENT (OUTPATIENT)
Dept: GENERAL RADIOLOGY | Facility: CLINIC | Age: 62
End: 2022-04-10
Attending: EMERGENCY MEDICINE
Payer: MEDICARE

## 2022-04-10 ENCOUNTER — HOSPITAL ENCOUNTER (EMERGENCY)
Facility: CLINIC | Age: 62
Discharge: HOME | End: 2022-04-10
Attending: EMERGENCY MEDICINE | Admitting: EMERGENCY MEDICINE
Payer: MEDICARE

## 2022-04-10 ENCOUNTER — HOSPITAL ENCOUNTER (OUTPATIENT)
Dept: EDUCATION SERVICES | Facility: CLINIC | Age: 62
Discharge: HOME | End: 2022-04-10
Payer: MEDICARE

## 2022-04-10 VITALS
HEART RATE: 73 BPM | SYSTOLIC BLOOD PRESSURE: 126 MMHG | OXYGEN SATURATION: 98 % | RESPIRATION RATE: 10 BRPM | TEMPERATURE: 97.3 F | DIASTOLIC BLOOD PRESSURE: 63 MMHG

## 2022-04-10 DIAGNOSIS — E16.2 HYPOGLYCEMIA: ICD-10-CM

## 2022-04-10 DIAGNOSIS — Z86.73 HISTORY OF CVA (CEREBROVASCULAR ACCIDENT): ICD-10-CM

## 2022-04-10 DIAGNOSIS — R31.9 HEMATURIA, UNSPECIFIED TYPE: ICD-10-CM

## 2022-04-10 DIAGNOSIS — R31.9 HEMATURIA SYNDROME: ICD-10-CM

## 2022-04-10 DIAGNOSIS — N28.9 URETERAL SLUDGE: ICD-10-CM

## 2022-04-10 DIAGNOSIS — M54.9 DORSALGIA: ICD-10-CM

## 2022-04-10 DIAGNOSIS — Z86.73 PERSONAL HISTORY OF TRANSIENT CEREBRAL ISCHEMIA: ICD-10-CM

## 2022-04-10 DIAGNOSIS — Z79.4 ENCOUNTER FOR LONG-TERM (CURRENT) USE OF INSULIN (H): ICD-10-CM

## 2022-04-10 DIAGNOSIS — N28.9 KIDNEY LESION, NATIVE, LEFT: ICD-10-CM

## 2022-04-10 DIAGNOSIS — R53.1 ASTHENIA: ICD-10-CM

## 2022-04-10 DIAGNOSIS — E11.649 DIABETIC HYPOGLYCEMIA (H): ICD-10-CM

## 2022-04-10 LAB
ALBUMIN SERPL-MCNC: 2.8 G/DL (ref 3.4–5)
ALBUMIN UR-MCNC: 30 MG/DL
ALP SERPL-CCNC: 182 U/L (ref 40–150)
ALT SERPL W P-5'-P-CCNC: 19 U/L (ref 0–50)
AMMONIA PLAS-SCNC: 12 UMOL/L (ref 10–50)
ANION GAP SERPL CALCULATED.3IONS-SCNC: 7 MMOL/L (ref 3–14)
APPEARANCE UR: ABNORMAL
APTT PPP: 32 SECONDS (ref 22–38)
AST SERPL W P-5'-P-CCNC: 28 U/L (ref 0–45)
BACTERIA #/AREA URNS HPF: ABNORMAL /HPF
BASOPHILS # BLD AUTO: 0.1 10E3/UL (ref 0–0.2)
BASOPHILS NFR BLD AUTO: 1 %
BILIRUB SERPL-MCNC: 0.3 MG/DL (ref 0.2–1.3)
BILIRUB UR QL STRIP: NEGATIVE
BUN SERPL-MCNC: 27 MG/DL (ref 7–30)
CALCIUM SERPL-MCNC: 9.3 MG/DL (ref 8.5–10.1)
CHLORIDE BLD-SCNC: 113 MMOL/L (ref 94–109)
CO2 SERPL-SCNC: 24 MMOL/L (ref 20–32)
COLOR UR AUTO: YELLOW
CREAT BLD-MCNC: 1.6 MG/DL (ref 0.5–1)
CREAT SERPL-MCNC: 1.51 MG/DL (ref 0.52–1.04)
EOSINOPHIL # BLD AUTO: 0.1 10E3/UL (ref 0–0.7)
EOSINOPHIL NFR BLD AUTO: 3 %
ERYTHROCYTE [DISTWIDTH] IN BLOOD BY AUTOMATED COUNT: 13.9 % (ref 10–15)
GFR SERPL CREATININE-BSD FRML MDRD: 36 ML/MIN/1.73M2
GFR SERPL CREATININE-BSD FRML MDRD: 38 ML/MIN/1.73M2
GLUCOSE BLD-MCNC: 71 MG/DL (ref 70–99)
GLUCOSE BLDC GLUCOMTR-MCNC: 129 MG/DL (ref 70–99)
GLUCOSE BLDC GLUCOMTR-MCNC: 171 MG/DL (ref 70–99)
GLUCOSE BLDC GLUCOMTR-MCNC: 226 MG/DL (ref 70–99)
GLUCOSE BLDC GLUCOMTR-MCNC: 67 MG/DL (ref 70–99)
GLUCOSE UR STRIP-MCNC: NEGATIVE MG/DL
HCT VFR BLD AUTO: 42.1 % (ref 35–47)
HGB BLD-MCNC: 13.1 G/DL (ref 11.7–15.7)
HGB UR QL STRIP: ABNORMAL
HOLD SPECIMEN: NORMAL
IMM GRANULOCYTES # BLD: 0 10E3/UL
IMM GRANULOCYTES NFR BLD: 0 %
INR BLD: 1.1 (ref 2–3)
INR PPP: 0.93 (ref 0.85–1.15)
KETONES UR STRIP-MCNC: NEGATIVE MG/DL
LACTATE SERPL-SCNC: 1.9 MMOL/L (ref 0.7–2)
LEUKOCYTE ESTERASE UR QL STRIP: ABNORMAL
LYMPHOCYTES # BLD AUTO: 2.1 10E3/UL (ref 0.8–5.3)
LYMPHOCYTES NFR BLD AUTO: 41 %
MCH RBC QN AUTO: 28.4 PG (ref 26.5–33)
MCHC RBC AUTO-ENTMCNC: 31.1 G/DL (ref 31.5–36.5)
MCV RBC AUTO: 91 FL (ref 78–100)
MONOCYTES # BLD AUTO: 0.3 10E3/UL (ref 0–1.3)
MONOCYTES NFR BLD AUTO: 6 %
MUCOUS THREADS #/AREA URNS LPF: PRESENT /LPF
NEUTROPHILS # BLD AUTO: 2.6 10E3/UL (ref 1.6–8.3)
NEUTROPHILS NFR BLD AUTO: 49 %
NITRATE UR QL: NEGATIVE
NRBC # BLD AUTO: 0 10E3/UL
NRBC BLD AUTO-RTO: 0 /100
PH UR STRIP: 7 [PH] (ref 5–7)
PLAT MORPH BLD: NORMAL
PLATELET # BLD AUTO: 211 10E3/UL (ref 150–450)
POTASSIUM BLD-SCNC: 3.9 MMOL/L (ref 3.4–5.3)
PROT SERPL-MCNC: 6.8 G/DL (ref 6.8–8.8)
RBC # BLD AUTO: 4.62 10E6/UL (ref 3.8–5.2)
RBC MORPH BLD: NORMAL
RBC URINE: 30 /HPF
SODIUM SERPL-SCNC: 144 MMOL/L (ref 133–144)
SP GR UR STRIP: 1.01 (ref 1–1.03)
SQUAMOUS EPITHELIAL: 2 /HPF
TRANSITIONAL EPI: <1 /HPF
TROPONIN I SERPL HS-MCNC: 11 NG/L
UROBILINOGEN UR STRIP-MCNC: 8 MG/DL
WBC # BLD AUTO: 5.2 10E3/UL (ref 4–11)
WBC URINE: 0 /HPF

## 2022-04-10 PROCEDURE — G1004 CDSM NDSC: HCPCS

## 2022-04-10 PROCEDURE — 96374 THER/PROPH/DIAG INJ IV PUSH: CPT | Mod: 59 | Performed by: EMERGENCY MEDICINE

## 2022-04-10 PROCEDURE — 250N000011 HC RX IP 250 OP 636: Performed by: EMERGENCY MEDICINE

## 2022-04-10 PROCEDURE — 72128 CT CHEST SPINE W/O DYE: CPT

## 2022-04-10 PROCEDURE — 83605 ASSAY OF LACTIC ACID: CPT | Performed by: EMERGENCY MEDICINE

## 2022-04-10 PROCEDURE — 84484 ASSAY OF TROPONIN QUANT: CPT | Performed by: EMERGENCY MEDICINE

## 2022-04-10 PROCEDURE — 71045 X-RAY EXAM CHEST 1 VIEW: CPT

## 2022-04-10 PROCEDURE — 71045 X-RAY EXAM CHEST 1 VIEW: CPT | Mod: 26 | Performed by: RADIOLOGY

## 2022-04-10 PROCEDURE — 85025 COMPLETE CBC W/AUTO DIFF WBC: CPT | Performed by: EMERGENCY MEDICINE

## 2022-04-10 PROCEDURE — 72125 CT NECK SPINE W/O DYE: CPT | Mod: 26 | Performed by: RADIOLOGY

## 2022-04-10 PROCEDURE — 82565 ASSAY OF CREATININE: CPT | Mod: 91

## 2022-04-10 PROCEDURE — 70498 CT ANGIOGRAPHY NECK: CPT | Mod: 26 | Performed by: RADIOLOGY

## 2022-04-10 PROCEDURE — 70496 CT ANGIOGRAPHY HEAD: CPT | Mod: MG

## 2022-04-10 PROCEDURE — 71250 CT THORAX DX C-: CPT | Mod: 26 | Performed by: RADIOLOGY

## 2022-04-10 PROCEDURE — 71250 CT THORAX DX C-: CPT

## 2022-04-10 PROCEDURE — 99285 EMERGENCY DEPT VISIT HI MDM: CPT | Mod: 25 | Performed by: EMERGENCY MEDICINE

## 2022-04-10 PROCEDURE — 81001 URINALYSIS AUTO W/SCOPE: CPT | Performed by: EMERGENCY MEDICINE

## 2022-04-10 PROCEDURE — 85730 THROMBOPLASTIN TIME PARTIAL: CPT | Performed by: EMERGENCY MEDICINE

## 2022-04-10 PROCEDURE — 72131 CT LUMBAR SPINE W/O DYE: CPT | Mod: 26 | Performed by: RADIOLOGY

## 2022-04-10 PROCEDURE — G1004 CDSM NDSC: HCPCS | Performed by: RADIOLOGY

## 2022-04-10 PROCEDURE — 36415 COLL VENOUS BLD VENIPUNCTURE: CPT | Performed by: EMERGENCY MEDICINE

## 2022-04-10 PROCEDURE — 85610 PROTHROMBIN TIME: CPT | Mod: 91 | Performed by: EMERGENCY MEDICINE

## 2022-04-10 PROCEDURE — 85610 PROTHROMBIN TIME: CPT

## 2022-04-10 PROCEDURE — 72131 CT LUMBAR SPINE W/O DYE: CPT

## 2022-04-10 PROCEDURE — 74176 CT ABD & PELVIS W/O CONTRAST: CPT | Mod: 26 | Performed by: RADIOLOGY

## 2022-04-10 PROCEDURE — 258N000001 HC RX 258

## 2022-04-10 PROCEDURE — 80053 COMPREHEN METABOLIC PANEL: CPT | Performed by: EMERGENCY MEDICINE

## 2022-04-10 PROCEDURE — 87086 URINE CULTURE/COLONY COUNT: CPT | Performed by: EMERGENCY MEDICINE

## 2022-04-10 PROCEDURE — 70496 CT ANGIOGRAPHY HEAD: CPT | Mod: 26 | Performed by: RADIOLOGY

## 2022-04-10 PROCEDURE — 72128 CT CHEST SPINE W/O DYE: CPT | Mod: 26 | Performed by: RADIOLOGY

## 2022-04-10 PROCEDURE — 999N000176 HC STATISTIC STROKE CODE W/O ACCESS

## 2022-04-10 PROCEDURE — 99291 CRITICAL CARE FIRST HOUR: CPT | Mod: 25 | Performed by: EMERGENCY MEDICINE

## 2022-04-10 PROCEDURE — 82140 ASSAY OF AMMONIA: CPT | Performed by: EMERGENCY MEDICINE

## 2022-04-10 PROCEDURE — 93005 ELECTROCARDIOGRAM TRACING: CPT | Performed by: EMERGENCY MEDICINE

## 2022-04-10 PROCEDURE — 93010 ELECTROCARDIOGRAM REPORT: CPT | Performed by: EMERGENCY MEDICINE

## 2022-04-10 PROCEDURE — 72125 CT NECK SPINE W/O DYE: CPT

## 2022-04-10 RX ORDER — IOPAMIDOL 755 MG/ML
5 INJECTION, SOLUTION INTRAVASCULAR ONCE
Status: COMPLETED | OUTPATIENT
Start: 2022-04-10 | End: 2022-04-10

## 2022-04-10 RX ORDER — DEXTROSE MONOHYDRATE 25 G/50ML
50 INJECTION, SOLUTION INTRAVENOUS ONCE
Status: COMPLETED | OUTPATIENT
Start: 2022-04-10 | End: 2022-04-10

## 2022-04-10 RX ORDER — DEXTROSE MONOHYDRATE 25 G/50ML
INJECTION, SOLUTION INTRAVENOUS
Status: COMPLETED
Start: 2022-04-10 | End: 2022-04-10

## 2022-04-10 RX ADMIN — DEXTROSE MONOHYDRATE 50 ML: 25 INJECTION, SOLUTION INTRAVENOUS at 14:38

## 2022-04-10 RX ADMIN — IOPAMIDOL 75 ML: 755 INJECTION, SOLUTION INTRAVENOUS at 14:44

## 2022-04-10 NOTE — ED PROVIDER NOTES
[place] EMERGENCY DEPARTMENT ([facility])  4/10/22  History     Chief Complaint   Patient presents with     Stroke Symptoms     HPI  Isabell Matthews is a 61 year old female with a past medical history significant for Acute ischemic stroke of right frontopperital punctate stroke due to small-vessel occlusion, seizure disorder (on Keppra 500 BID), diabetes mellitus type 2, CKD, hypertension, hyperlipidemia, CAD (s/p percutaneous coronary intervention, 2004), hypothyroidism, and vascular dementia who presents to the Emergency Department for evaluation of increased weakness.     Per EMS, patient has history of a stroke that occurred earlier this week (admitted to [facility] from 04/05/2022-04/07/2022).  Patient's daughter reports increased weakness of the patient's left side.  Patient's daughter reported that the patient has not been able to small and is not able to walk without leaning to the left.  Patient reported last night that she was experiencing double vision.  Last known normal was around 1400.  Patient's daughter reports that the patient woke up feeling normal and went to the bathroom and while the patient was sitting on the bathroom toilet she was leaning to the left.    Patient reports that she had a stroke on 04/05/2022.  She states that from her previous stroke she had left-sided weakness.  She endorses that today she feels like her left side is weaker than her baseline.  Patient denies having any double vision while in the ED today.  Patient denies being on anticoagulants.  Patient denies recent fall.    Per chart review, patient was admitted to [facility] from 04/05/2022-04/07/2022 for evaluation of 1 day of difficulty walking and 45 minutes of left vision loss.  Patient was found to have right frontopperital punctate stroke. IV thrombolysis was not given due to unclear or unfavorable risk-benefit profile for extended window thrombolysis beyond the conventional 4.5 hour time window. Etiology was thought to  be small vessel disease. INR was 1.02. Patient was started on aspirin 81 mg upon discharge.     Echocardiogram:  Impression:  Global and regional left ventricular function is normal with an EF of 55-60%.  Global right ventricular function is normal. The right ventricle is normal  size.  No significant valvular abnormalities.  IVC diameter <2.1 cm collapsing >50% with sniff suggests a normal RA pressure  of 3 mmHg.  There is no prior study for direct comparison.    CT head:  No acute intracranial pathology.   Unchanged chronic findings of chronic lacunar infarction in the right  basal ganglia and left external capsule. Advanced cortical cerebral  and cerebellar volume loss is more than expected for age.  2. ASPECT Score: 10/10.     CTA head and neck:  1. Head CTA demonstrates multifocal different levels of stenoses of  middle and posterior cerebral artery distal segments. The etiology is  probable atherosclerotic disease. No definite occlusive thrombus is  identified.      2. Neck CTA demonstrates minimal atherosclerotic calcifications of the  left greater than right carotid bulbs.    EXAM: MR BRAIN W/O and W CONTRAST  IMPRESSION:  1.  Right frontoparietal high convexity within the sensorimotor region punctate acute infarct.   2.  No additional acute infarcts.  3.  Multiple chronic infarcts described above.  4.  Age related changes described above.  5.  Chronic microhemorrhages described above.    Past Medical History:   Diagnosis Date     Chronic pain      Coronary atherosclerosis 6/14/2016     Essential hypertension      Ex-cigarette smoker     quit 7/2018 over 35 years     Ex-cigarette smoker      Hyperlipidemia      Hypothyroid      Seizures (H)      Stroke (H)      Vascular dementia (H)        Past Surgical History:   Procedure Laterality Date     athroplasty hip Bilateral     2003, 2006     OTHER SURGICAL HISTORY      athroplasty hip     STENT         Family History   Problem Relation Age of Onset     Acute  Myocardial Infarction Father      Heart Disease Maternal Grandmother      Dementia Maternal Grandmother      Myocardial Infarction Father      Dementia Paternal Grandmother      Heart Disease Paternal Grandmother        Social History     Tobacco Use     Smoking status: Former Smoker     Packs/day: 0.50     Years: 35.00     Pack years: 17.50     Types: Cigarettes     Quit date: 7/1/2018     Years since quitting: 3.9     Smokeless tobacco: Never Used   Substance Use Topics     Alcohol use: Not Currently       No current facility-administered medications for this encounter.     Current Outpatient Medications   Medication     aspirin (ASA) 81 MG chewable tablet     atorvastatin (LIPITOR) 40 MG tablet     blood glucose (NO BRAND SPECIFIED) test strip     carvedilol (COREG) 6.25 MG tablet     cyanocobalamin (VITAMIN B-12) 1000 MCG tablet     diclofenac (VOLTAREN) 1 % topical gel     doxazosin (CARDURA) 1 MG tablet     DULoxetine (CYMBALTA) 20 MG capsule     fluconazole (DIFLUCAN) 100 MG tablet     insulin glargine (LANTUS PEN) 100 UNIT/ML pen     insulin lispro (HUMALOG) 100 UNIT/ML vial     insulin lispro (HUMALOG) 100 UNIT/ML vial     insulin pen needle (31G X 8 MM) 31G X 8 MM miscellaneous     levETIRAcetam (KEPPRA) 500 MG tablet     loratadine (CLARITIN) 10 mg tablet     melatonin 10 mg Tab     nystatin (MYCOSTATIN) 091543 UNIT/GM external cream     pregabalin (LYRICA) 75 MG capsule     SYNTHROID 88 MCG tablet     Vitamin D3 (CHOLECALCIFEROL) 25 mcg (1000 units) tablet        Allergies   Allergen Reactions     Bee Pollen Headache     Pollen Extract Headache        I have reviewed the Medications, Allergies, Past Medical and Surgical History, and Social History in the Epic system.    Review of Systems  A complete review of systems was performed with pertinent positives and negatives noted in the HPI, and all other systems negative.    Physical Exam   BP: 124/67  Pulse: 74  Temp: 97.3  F (36.3  C)  Resp: 8  SpO2: 98  %      Physical Exam  Vitals reviewed.   Constitutional:       Appearance: She is well-developed.   HENT:      Head: Normocephalic and atraumatic.   Eyes:      Extraocular Movements: Extraocular movements intact.      Conjunctiva/sclera: Conjunctivae normal.      Pupils: Pupils are equal, round, and reactive to light.   Cardiovascular:      Rate and Rhythm: Normal rate and regular rhythm.      Pulses: Normal pulses.      Heart sounds: Normal heart sounds. No murmur heard.  Pulmonary:      Effort: Pulmonary effort is normal. No respiratory distress.      Breath sounds: Normal breath sounds. No wheezing or rales.   Abdominal:      General: Bowel sounds are normal. There is no distension.      Palpations: Abdomen is soft. There is no mass.      Tenderness: There is no abdominal tenderness. There is no guarding or rebound.   Musculoskeletal:         General: No swelling. Normal range of motion.      Cervical back: Normal range of motion and neck supple. No rigidity.   Skin:     General: Skin is warm and dry.      Capillary Refill: Capillary refill takes less than 2 seconds.      Findings: No rash.   Neurological:      Mental Status: She is alert.      GCS: GCS eye subscore is 4. GCS verbal subscore is 5. GCS motor subscore is 6.      Sensory: No sensory deficit.      Comments: 3-4/5 Left upper and lower extremity weakness compared to 5/5 in the right. Sensation intact to light touch. Slight left facial droop. Mild dysarthria. Slightly confused         ED Course     At 2:35 PM the patient was seen and examined by Kyra Maciel MD in Room ED03.     ED Course as of 06/05/22 0809   Sun Apr 10, 2022   1448 ICH 2021   1448 POC glucose 64 here, EMS reported 74. Amp D50 given.    1449 Tier 1 stroke code called on arrival   2000      Procedures              EKG Interpretation:      Interpreted by Kyra Maciel MD  Time reviewed: 3:11 pm  Symptoms at time of EKG: stroke symptoms   Rhythm: normal sinus   Rate:  normal  Axis: normal  Ectopy: none  Conduction: normal  ST Segments/ T Waves: No ST-T wave changes  Q Waves: none  Comparison to prior: Unchanged    Clinical Impression: normal EKG    Critical Care Addendum    My initial assessment, based on my review of prehospital provider report, review of nursing observations, review of vital signs, focused history and physical exam, established that Isabell Matthews has focal neurologic abnormalities, which requires immediate intervention, and therefore she is critically ill.     After the initial assessment, the care team initiated multiple lab tests, initiated medication therapy with amp D50 and consulted with stroke neurology to provide stabilization care. Due to the critical nature of this patient, I reassessed nursing observations, vital signs, physical exam, mental status and neurologic status multiple times prior to her disposition.     Time also spent performing documentation, discussion with family to obtain medical information for decision making, reviewing test results, discussion with consultants and coordination of care.     Critical care time (excluding teaching time and procedures): 45 minutes.   The patient has stroke symptoms:         ED Stroke specific documentation           NIHSS PDF     Patient last known well time: 1400  ED Provider first to bedside at: on arrival  CT Results received at: as documented    Thrombolytics:   Not given due to:   - head trauma/stroke within the past 3 months    If treating with thrombolytics: Ensure SBP<180 and DBP<105 prior to treatment with thrombolytics.  Administering thrombolytics after treatment with IV labetalol, hydralazine, or nicardipine is reasonable once BP control is established.    Endovascular Retrieval:  Not initiated due to absence of proximal vessel occlusion    National [employer] Stroke Scale (Baseline)  Time Performed: on arrival     Score    Level of consciousness: (0)   Alert, keenly responsive     LOC questions: (1)   Answers one question correctly    LOC commands: (0)   Performs both tasks correctly    Best gaze: (0)   Normal    Visual: (0)   No visual loss    Facial palsy: (1)   Minor paralysis (flat nasolabial fold, smile asymmetry)    Motor arm (left): (1)   Drift    Motor arm (right): (0)   No drift    Motor leg (left): (0)   No drift    Motor leg (right): (0)   No drift    Limb ataxia: (0)   Absent    Sensory: (0)   Normal- no sensory loss    Best language: (0)   Normal- no aphasia    Dysarthria: (1)   Mild to moderate dysarthria    Extinction and inattention: (0)   No abnormality        Total Score:  4        Stroke Mimics were considered (including migraine headache, seizure disorder, hypoglycemia (or hyperglycemia), head or spinal trauma, CNS infection, Toxin ingestion and shock state (e.g. sepsis) .               Labs Ordered and Resulted from Time of ED Arrival to Time of ED Departure   COMPREHENSIVE METABOLIC PANEL - Abnormal       Result Value    Sodium 144      Potassium 3.9      Chloride 113 (*)     Carbon Dioxide (CO2) 24      Anion Gap 7      Urea Nitrogen 27      Creatinine 1.51 (*)     Calcium 9.3      Glucose 71      Alkaline Phosphatase 182 (*)     AST 28      ALT 19      Protein Total 6.8      Albumin 2.8 (*)     Bilirubin Total 0.3      GFR Estimate 38 (*)    CBC WITH PLATELETS AND DIFFERENTIAL - Abnormal    WBC Count 5.2      RBC Count 4.62      Hemoglobin 13.1      Hematocrit 42.1      MCV 91      MCH 28.4      MCHC 31.1 (*)     RDW 13.9      Platelet Count 211      % Neutrophils 49      % Lymphocytes 41      % Monocytes 6      % Eosinophils 3      % Basophils 1      % Immature Granulocytes 0      NRBCs per 100 WBC 0      Absolute Neutrophils 2.6      Absolute Lymphocytes 2.1      Absolute Monocytes 0.3      Absolute Eosinophils 0.1      Absolute Basophils 0.1      Absolute Immature Granulocytes 0.0      Absolute NRBCs 0.0     ROUTINE UA WITH MICROSCOPIC REFLEX TO CULTURE - Abnormal     Color Urine Yellow      Appearance Urine Slightly Cloudy (*)     Glucose Urine Negative      Bilirubin Urine Negative      Ketones Urine Negative      Specific Gravity Urine 1.007      Blood Urine Trace (*)     pH Urine 7.0      Protein Albumin Urine 30  (*)     Urobilinogen Urine 8.0 (*)     Nitrite Urine Negative      Leukocyte Esterase Urine Trace (*)     Bacteria Urine Many (*)     Mucus Urine Present (*)     RBC Urine 30 (*)     WBC Urine 0      Squamous Epithelials Urine 2 (*)     Transitional Epithelials Urine <1     GLUCOSE BY METER - Abnormal    GLUCOSE BY METER POCT 226 (*)    GLUCOSE BY METER - Abnormal    GLUCOSE BY METER POCT 129 (*)    GLUCOSE BY METER - Abnormal    GLUCOSE BY METER POCT 171 (*)    INR - Normal    INR 0.93     PARTIAL THROMBOPLASTIN TIME - Normal    aPTT 32     TROPONIN I - Normal    Troponin I High Sensitivity 11     LACTIC ACID WHOLE BLOOD - Normal    Lactic Acid 1.9     AMMONIA - Normal    Ammonia 12     RBC AND PLATELET MORPHOLOGY    Platelet Assessment        Value: Automated Count Confirmed. Platelet morphology is normal.    RBC Morphology Confirmed RBC Indices       Lumbar spine CT w/o contrast   Final Result   Impression:    1. Thoracic spine:  No acute fracture or traumatic subluxation. No   high-grade spinal canal or neural foraminal stenosis.    2. Lumbar Spine: No acute fracture or traumatic subluxation. No   high-grade spinal canal or neural foraminal stenosis.      I have personally reviewed the examination and initial interpretation   and I agree with the findings.      KEE SRINIVASAN MD            SYSTEM ID:  [id number]      CT Thoracic Spine w/o Contrast   Final Result   Impression:    1. Thoracic spine:  No acute fracture or traumatic subluxation. No   high-grade spinal canal or neural foraminal stenosis.    2. Lumbar Spine: No acute fracture or traumatic subluxation. No   high-grade spinal canal or neural foraminal stenosis.      I have personally reviewed the  examination and initial interpretation   and I agree with the findings.      KEE SRINIVASAN MD            SYSTEM ID:  [id number]      Cervical spine CT w/o contrast   Final Result   Impression:    1. No acute fracture or traumatic subluxation.   2. Cervical spondylosis most prominent at C5-7 as described above.      I have personally reviewed the examination and initial interpretation   and I agree with the findings.      KEE SRINIVASAN MD            SYSTEM ID:  [id number]      CT Chest Abdomen Pelvis w/o Contrast   Final Result   IMPRESSION:    1. Mildly increased size of the intermediate density lesion within the   interpolar region of the left kidney measuring up to 3.8 cm,   previously 3.1 cm on CT dated 9/27/2020.   2. Urinary excretion of the prior administered contrast medium   limiting evaluation of renal stones.   3. Ovaries/adnexal regions are slightly bulky for patient's   postmenopausal status, similar to slightly increased from prior study.   Trace pelvic fluid. Pelvis somewhat difficult to evaluate due to   streak artifact from hip arthroplasties. Could consider elective   follow-up ultrasound pelvis to further evaluate if the patient has not   had prior recent outside imaging.   4. Additional stable chronic findings as described above.       I have personally reviewed the examination and initial interpretation   and I agree with the findings.      LEIDY ESPINOZA MD            SYSTEM ID:  [id number]      CTA Head Neck with Contrast   Final Result   Impression:     1. Head CTA demonstrates focal severe stenosis of the left PCA P1/P2   junction. Additional multifocal mild stenosis in bilateral distal MCA   and PCA branches. No arterial occlusion.   2. Neck CTA demonstrates atherosclerotic calcifications at bilateral   carotid bulbs with 45-50% stenosis of the left internal carotid   artery. No stenosis of the major cervical arteries.      KEE SRINIVASAN MD            SYSTEM ID:  [id number]      CT Head  w/o Contrast   Final Result   Impression:   1. No acute intracranial pathology.    2. Chronic small vessel ischemic disease and chronic lacunar infarcts.      KEE SRINIVASAN MD            SYSTEM ID:  [id number]      XR Chest Port 1 View   Final Result   IMPRESSION: No acute cardiopulmonary disease.       I have personally reviewed the examination and initial interpretation   and I agree with the findings.      LEIDY ESPINOZA MD            SYSTEM ID:  [id number]          Medications   dextrose 50 % injection 50 mL (50 mLs Intravenous Given 4/10/22 1438)   iopamidol (ISOVUE-370) solution 5 mL (75 mLs Intravenous Given 4/10/22 1444)   sodium chloride (PF) 0.9% PF flush 85 mL ( Intravenous Canceled Entry 4/10/22 1535)             Assessments & Plan (with Medical Decision Making)   Patient with recent stroke who presents with worsening left-sided weakness and some confusion noted by daughter this morning.  She does appear more weak on the left than the right which is difficult as this is apparently where she was affected from her stroke so uncertain how much worse this is from the baseline.  EMS had reported a normal glucose however on our glucose here this is slightly low at 64.  Amp of D50 was given.  We did call a stroke code and neurology was at bedside after the glucose was given.  Patient was improving with this.  Do feel that likely the symptoms were created by the hypoglycemia and they gave her recrudescence of her stroke deficits.  Did obtain a CT of the head which is without any evidence of intracranial hemorrhage.  CTA of the head and neck was reviewed by neurology and this was similar to prior without new pathology.  EKG was obtained which revealed no signs of acute ischemia.  Laboratory studies are otherwise unremarkable as above.  White blood cell count is normal.  Ammonia within normal notes.  Lactic acid within normal limits.  Hemoglobin within normal limits.  CMP as above.  Chest x-ray is unremarkable.   Daughter did arrive and stated she now appeared to be at her baseline.  We discussed the glucose level.  On multiple rechecks the glucose was stable and normal.  Patient was eating and drinking here.  We were awaiting obtaining urine.  This did reveal hematuria.  Had further discussion with the patient and the daughter and then daughter did state that the patient had a fall and the patient was complaining of some back pain.  Thus did obtain CT of the thoracic and lumbar spine as well as the cervical spine and also did CT chest abdomen pelvis without contrast for further evaluation of the hematuria and any other injuries.  CT of the cervical spine, thoracic spine, lumbar spine did not reveal any acute pathology.    CT of the chest abdomen pelvis revealed mildly increased size of the intermediate density lesion within the interpole region of the left kidney measuring up to 3.8 cm previously was 3.1.  No obvious renal stones.  Ovaries/adnexa regions are slightly bulky for patient's postmenopausal status similar to slightly increased from prior study.  No other acute pathology.  I did discuss the imaging results with the patient and her daughter and stressed importance of follow-up with her primary care doctor and also referred her to urology due to the concern that this could potentially be cancerous and warrants further work-up.  They voiced understanding.  Patient was ambulating here.  She was at her baseline.  She was feeling improved.  She was given strict return precautions.  Patient was discharged home in the care of her daughter in stable condition.    I have reviewed the nursing notes.    I have reviewed the findings, diagnosis, plan and need for follow up with the patient.    Discharge Medication List as of 4/10/2022  9:56 PM          Final diagnoses:   History of CVA (cerebrovascular accident)   Hypoglycemia   Hematuria, unspecified type   Kidney lesion, native, left       Rubi BARRIENTOS, am serving as a  trained medical scribe to document services personally performed by Kyra Maciel MD, based on the provider's statements to me.      I, Kyra Maciel MD, was physically present and have reviewed and verified the accuracy of this note documented by Rubi Trujillo.       Kyra Maciel MD  4/10/2022   [facility] EMERGENCY DEPARTMENT     Kyra Maciel MD  06/05/22 0834

## 2022-04-10 NOTE — PROGRESS NOTES
Stroke Code Nurse-Responder Note    Arrival Time to Stroke Code: 1435    Stroke Code Team interventions:   - De-escalated at 1554 by Stroke Team    ED/Bedside Nurse providing handoff: Sharon Waller    Time left for CT: 1438    Time arrived to next location (ED/Unit/IR): 1455    ED/Bedside Nurse given handoff (name/time): Sharon Tejada RN

## 2022-04-10 NOTE — CONSULTS
[facility]    Stroke Consult Note    Reason for Consult: Stroke Code     Chief Complaint: Cerebrovascular Accident      HPI  Isabell Matthews is a 61 year old female with history of putaminal ICH, previous ischemic strokes, vascular dementia, seizure disorder (on Keppra 500 BID),  HTN, HLD, CAD s/p PCI 2004, CKD, T2DM, and hypothyroidism who was recently discharged  from [facility] stroke service for a right fronto parietal punctate infarct 04/07/2022 Brought to the ED by EMS due to co,mplains of blurry vision and listing towards the left. The history was obtained from the patients daughter Vishal. Who endorsed that the patient woke up this morning around 11 am and she helped her sit on the recliner chair, soon after which that patient was taking a nap. The daughter was preparing breakfast for her mother but did not give it to her as she was sleeping. Around noon the daughter helped the patient go to the bathroom using a wheelchair and while in the bathroom the patient was listing more towards the right and complained of blurry vision. The daughter endorses that the patient seemed a bit drowsy as well. Hence she called the EMS and after coming to the ED stroke code was initiated. The daughter also endorsed that the patient had not had her breakfast or any of her meds this morning.   In the ED her BP was 135/65, BS 64. She was transferred to CT right away. And was given an amp of D5.     Imaging Findings  1. Head CTA demonstrates focal severe stenosis of the left PCA P1/P2  junction. Additional multifocal mild stenosis in bilateral distal MCA  and PCA branches. No arterial occlusion.  2. Neck CTA demonstrates atherosclerotic calcifications at bilateral  carotid bulbs with 45-50% stenosis of the left internal carotid  artery. No stenosis of the major cervical arteries.  3. CT head:  No acute intracranial pathology.  Chronic small vessel ischemic disease and chronic lacunar  infarcts.    Intravenous Thrombolysis  Not given due to:   - history of intracranial hemorrhage  - minor/isolated/quickly resolving symptoms  - stroke mimic: possibly hypoglycemia with recrudescence    Endovascular Treatment  Not initiated due to absence of proximal vessel occlusion    Impression   The patient most likely had recrudescence of symptoms which was exacerbated by the hypoglycemia. When I saw the patient she was alert and oriented with a NIHSS score of 3. Noting the fact that the patient was seen after she received D5. CT was unrevealing and we do not think she needs further work up. I discussed with the daughter regarding possible TCU admission for rehab,m but the daughter decided against it. We believe it is safe to discharge the patient at the moment. She has home health referral place which is going to be set up this week for her according to the daughter.     Recommendations  - No need for MRI  - Can discharge home with daughter.     Patient Follow-up      Thank you for this consult. No further stroke evaluation is recommended, so we will sign off. Please contact us with any additional questions.    The patient was discussed with the Stroke Staff is Dr. Soto.    Julio C Hay MD  Neurology Resident  Ascom: [id number]  ______________________________________________________    Clinically Significant Risk Factors Present on Admission             # Hypoalbuminemia: Albumin = 2.8 g/dL (Ref range: 3.4 - 5.0 g/dL) on admission, will monitor as appropriate   # Platelet Defect: home medication list includes an antiplatelet medication      - last GFR = 38 mL/min/1.73m2 (Ref range: >60 mL/min/1.73m2); 36 mL/min/1.73m2 (Ref range: >60 mL/min/1.73m2)     Past Medical History   Past Medical History:   Diagnosis Date     Chronic pain      Coronary atherosclerosis 6/14/2016     Essential hypertension      Ex-cigarette smoker     quit 7/2018 over 35 years     Ex-cigarette smoker      Hyperlipidemia      Hypothyroid       Seizures (H)      Stroke (H)      Vascular dementia (H)      Past Surgical History   Past Surgical History:   Procedure Laterality Date     athroplasty hip Bilateral     2003, 2006     OTHER SURGICAL HISTORY      athroplasty hip     STENT       Medications   Home Meds  Prior to Admission medications    Medication Sig Start Date End Date Taking? Authorizing Provider   aspirin (ASA) 81 MG chewable tablet Take 1 tablet (81 mg) by mouth daily 4/8/22 5/8/22  Julio C Hay MD   atorvastatin (LIPITOR) 40 MG tablet Take 1 tablet (40 mg) by mouth daily 7/27/21   Bony Castaneda MD   blood glucose (NO BRAND SPECIFIED) test strip Use to test blood sugar 4-5 times daily or as directed. 1/14/22   Bony Castaneda MD   carvedilol (COREG) 6.25 MG tablet 1 tablet (6.25 mg) by Oral or Feeding Tube route 2 times daily (with meals) 7/27/21   oBny Castaneda MD   clotrimazole (LOTRIMIN) 1 % external cream Apply topically 2 times daily Until rash resolved 11/2/20   Bony Castaneda MD   cyanocobalamin (VITAMIN B-12) 1000 MCG tablet Take 1 tablet (1,000 mcg) by mouth daily 9/2/20   Bony Castaneda MD   diclofenac (VOLTAREN) 1 % topical gel Apply 2 g topically 4 times daily as needed for moderate pain 2/25/21   Bony Castaneda MD   doxazosin (CARDURA) 1 MG tablet Take 1 tablet (1 mg) by mouth At Bedtime 7/26/21   Bony Castaneda MD   DULoxetine (CYMBALTA) 20 MG capsule Take 1 capsule (20 mg) by mouth daily 9/7/21   Bony Castaneda MD   insulin glargine (LANTUS PEN) 100 UNIT/ML pen Please inject 18 units at bedtime change of dose 8/13/21   Bony Castaneda MD   insulin lispro (HUMALOG) 100 UNIT/ML Cartridge Take prior to meal with sliding scale per glucose level adjust as noted in notes usual dose around 20 units daily,1 unit to max 13 units Prior to meals taking 2 meals daily,1 unit with food premeal plus additional 140-169 (3)170 -199 (4) 200-229(5) 230-259(6) 260-289( 7) 290-319(8)  320-349(9) 350-379 (10) 380-409(11) greater 410(12)y 8/13/21   Bony Castaneda MD   insulin pen needle (31G X 8 MM) 31G X 8 MM miscellaneous Use 4 pen needles daily or as directed. 9/2/20   Bony Castaneda MD   levETIRAcetam (KEPPRA) 500 MG tablet Take 1 tablet (500 mg) by mouth 2 times daily 8/30/21   Bony Castaneda MD   loratadine (CLARITIN) 10 mg tablet [LORATADINE (CLARITIN) 10 MG TABLET] Take 10 mg by mouth daily. 6/28/21   Provider, Historical   melatonin 10 mg Tab [MELATONIN 10 MG TAB] Take 10 mg by mouth daily. 6/28/21   Provider, Historical   NIFEdipine ER (ADALAT CC) 60 MG 24 hr tablet Take 1 tablet (60 mg) by mouth daily 2/10/22   Bony Castaneda MD   pregabalin (LYRICA) 75 MG capsule TAKE 1 CAPSULE(75 MG) BY MOUTH TWICE DAILY 4/5/22   Bony Castaneda MD   SYNTHROID 88 MCG tablet Take 1 tablet (88 mcg) by mouth daily 7/26/21   Bony Castaneda MD   terbinafine (LAMISIL) 1 % external cream Apply topically to gume of rash twice daily if needed 2/25/21   Bony Castaneda MD   Vitamin D3 (CHOLECALCIFEROL) 25 mcg (1000 units) tablet Take 1 tablet (25 mcg) by mouth daily 9/2/20   Bony Castaneda MD       Scheduled Meds      Infusion Meds      PRN Meds      Allergies   Allergies   Allergen Reactions     Bee Pollen Headache     Pollen Extract Headache     Family History   Family History   Problem Relation Age of Onset     Acute Myocardial Infarction Father      Heart Disease Maternal Grandmother      Dementia Maternal Grandmother      Myocardial Infarction Father      Dementia Paternal Grandmother      Heart Disease Paternal Grandmother      Social History   Social History     Tobacco Use     Smoking status: Former Smoker     Packs/day: 0.50     Years: 35.00     Pack years: 17.50     Types: Cigarettes     Quit date: 7/1/2018     Years since quitting: 3.7     Smokeless tobacco: Never Used   Substance Use Topics     Alcohol use: Not Currently     Drug use: Not Currently        Review of Systems   The 10 point Review of Systems is negative other than noted in the HPI or here.        PHYSICAL EXAMINATION  Temp:  [97.3  F (36.3  C)] 97.3  F (36.3  C)  Pulse:  [66-77] 66  Resp:  [8-12] 10  BP: (122-141)/(66-91) 123/66  SpO2:  [97 %-99 %] 99 %     General Exam  General:  patient lying in bed without any acute distress    HEENT:  normocephalic/atraumatic  Cardio:  RRR  Pulmonary:  no respiratory distress  Abdomen:  soft, non-tender  Extremities:  no edema  Skin:  intact     Neuro Exam  Mental Status:  alert, oriented x 3, follows commands, speech clear and fluent, naming and repetition normal  Cranial Nerves:  visual fields intact, EOMI with normal smooth pursuit, facial sensation intact and symmetric , slight left facial droop?, hearing not formally tested but intact to conversation, no dysarthria, shoulder shrug equal bilaterally, tongue protrusion midline  Motor:  no abnormal movements, able to move all limbs antigravity spontaneously with no signs of hemiparesis observed, no pronator drift   Reflexes:  Did not assess  Sensory:  light touch sensation intact and symmetric throughout upper and lower extremities, no extinction on double simultaneous stimulation   Coordination:  normal finger-to-nose and heel-to-shin bilaterally without dysmetria, rapid alternating movements symmetric  Station/Gait:  unable to test due to telestroke    Dysphagia Screen  Per Nursing    Stroke Scales    NIHSS  1a. Level of Consciousness 0-->Alert, keenly responsive   1b. LOC Questions 1-->Answers one question correctly   1c. LOC Commands 0-->Performs both tasks correctly   2.   Best Gaze 0-->Normal   3.   Visual 0-->No visual loss   4.   Facial Palsy 1-->Minor paralysis (flattened nasolabial fold, asymmetry on smiling)   5a. Motor Arm, Left 0-->No drift, limb holds 90 (or 45) degrees for full 10 secs   5b. Motor Arm, Right 0-->No drift, limb holds 90 (or 45) degrees for full 10 secs   6a. Motor Leg, Left  0-->No drift, leg holds 30 degree position for full 5 secs   6b. Motor Leg, right 0-->No drift, leg holds 30 degree position for full 5 secs   7.   Limb Ataxia 0-->Absent   8.   Sensory 0-->Normal, no sensory loss   9.   Best Language 0-->No aphasia, normal   10. Dysarthria 1-->Mild-to-moderate dysarthria, patient slurs at least some words and, at worst, can be understood with some difficulty   11. Extinction and Inattention  0-->No abnormality   Total 3 (04/10/22 1500)           Imaging  I personally reviewed all imaging; relevant findings per HPI.     Lab Results Data   CBC  Recent Labs   Lab 04/10/22  1441 04/07/22  0529 04/05/22  2115   WBC 5.2 5.1 5.4   RBC 4.62 3.96 4.15   HGB 13.1 11.1* 11.7   HCT 42.1 35.6 36.7    183 212     Basic Metabolic Panel    Recent Labs   Lab 04/10/22  1451 04/10/22  1441 04/10/22  1434 04/07/22  0812 04/07/22  0529 04/06/22  0723 04/06/22  0407 04/06/22  0014 04/05/22  2116   NA  --  144  --   --  138  --   --   --  139   POTASSIUM  --  3.9  --   --  3.8  --  3.9  --  3.8   CHLORIDE  --  113*  --   --  106  --   --   --  106   CO2  --  24  --   --  22  --   --   --  26   BUN  --  27  --   --  22  --   --   --  25   CR  --  1.51*  1.6*  --   --  1.45*  --   --    < > 1.46*   * 71 67*   < > 344*   < >  --    < > 240*   VERONICA  --  9.3  --   --  8.7  --   --   --  8.9    < > = values in this interval not displayed.     Liver Panel  Recent Labs   Lab 04/10/22  1441   PROTTOTAL 6.8   ALBUMIN 2.8*   BILITOTAL 0.3   ALKPHOS 182*   AST 28   ALT 19     INR    Recent Labs   Lab Test 04/10/22  1441 04/10/22  1439 04/05/22 2115   INR 0.93 1.1* 1.02      Lipid Profile    Recent Labs   Lab Test 04/06/22  0014 08/02/21  1009   CHOL 99 146   HDL 34* 39*   LDL 39 75   TRIG 129 158*     A1C    Recent Labs   Lab Test 04/05/22  2115 08/02/21  1009 06/21/21 2011   A1C 8.8* 8.2* 7.8*     Troponin I    Recent Labs   Lab 04/10/22  1441 04/06/22  0407 04/06/22  0014   TROPONINIS 11 44 41           Stroke Code Data Data   Stroke Code Data  (for stroke code without tele)  Stroke code activated 04/10/22   1432   First stroke provider response         Last known normal 1100   Time of discovery   (or onset of symptoms)         Head CT read by Stroke Neuro Dr/Provider 04/10/22   6700   Was stroke code de-escalated? Yes 04/10/22 9895

## 2022-04-10 NOTE — PHARMACY-CONSULT NOTE
Pharmacy Stroke Code Response    Pharmacist responded as part of the Stroke Code Team activation to patient care area ED3. The Stroke Team determined that the patient was not a candidate for IV thrombolytic therapy and the pharmacy team was dismissed at 1530.    Mian Caldera PharmD, BCPS

## 2022-04-10 NOTE — ED TRIAGE NOTES
Pt BIBA f/home with stroke symptoms. LKW 30 min ago. HX: stroke last week per daughter. Pt main c/o double vision. BS 74. NSR on 12 lead ekg by ems.Tier 1 stroke code activated

## 2022-04-11 ENCOUNTER — TELEPHONE (OUTPATIENT)
Dept: NEUROLOGY | Facility: CLINIC | Age: 62
End: 2022-04-11
Payer: MEDICARE

## 2022-04-11 ENCOUNTER — TELEPHONE (OUTPATIENT)
Dept: FAMILY MEDICINE | Facility: CLINIC | Age: 62
End: 2022-04-11
Payer: MEDICARE

## 2022-04-11 LAB
ATRIAL RATE - MUSE: 70 BPM
DIASTOLIC BLOOD PRESSURE - MUSE: NORMAL MMHG
INTERPRETATION ECG - MUSE: NORMAL
P AXIS - MUSE: 62 DEGREES
PR INTERVAL - MUSE: 156 MS
QRS DURATION - MUSE: 90 MS
QT - MUSE: 438 MS
QTC - MUSE: 473 MS
R AXIS - MUSE: 70 DEGREES
SYSTOLIC BLOOD PRESSURE - MUSE: NORMAL MMHG
T AXIS - MUSE: 51 DEGREES
VENTRICULAR RATE- MUSE: 70 BPM

## 2022-04-11 NOTE — TELEPHONE ENCOUNTER
I think this is just fine. She has history of seizure disorder but looks like the admission was just stroke related. We can always reach out to  if needed (or try to schedule it on a date that he is on service (May 22 or June 1st maybe?).     Thanks,    Tena Cope PA-C

## 2022-04-11 NOTE — TELEPHONE ENCOUNTER
"Per discharge summary from the stroke neurology service: \"Follow up with me,  Stroke neurology within 4-6 weeks  for hospital follow- up.\"    Routing to both Dr. Soto and APPs. Next available for Dr. Soto is August. Should we set up BULMARO appt in May/June then plan for pt to see him in August?    Rita Duque BS, RN, SCRN  RN Stroke Neurology Care Coordinator  [facility] Neuroscience Service Line    "

## 2022-04-11 NOTE — TELEPHONE ENCOUNTER
LVM w/ pcc number for pt to schedule hospital follow up appt - offered appt for 4/18 @ 9am or 11:30am

## 2022-04-11 NOTE — DISCHARGE INSTRUCTIONS
Please make an appointment to follow up with Your Primary Care Provider as soon as possible. Please make an appointment to follow up with Urology Clinic (phone: [phone]) as soon as possible.      Monitor the glucose levels at home as before. Discuss the blood in the urine with your primary doctor and the urologist as well as the lesion on the left kidney as we are concerned this could be cancer and she needs a work up/possible further imaging for evaluation as soon as possible.     Return for any new or worsening concerns.

## 2022-04-11 NOTE — TELEPHONE ENCOUNTER
[facility] Call Center    Phone Message    May a detailed message be left on voicemail: yes     Reason for Call: Other: Patient was seen in hospital with Dr. Soto and he wanted her to follow up 4-6 weeks. Patient scheduled for 8/15 first available but needs to be seen sooner. Please contact daughter Vishal to schedule sooner appt.     Action Taken: Message routed to:  [facility] neurology    Travel Screening: Not Applicable

## 2022-04-11 NOTE — TELEPHONE ENCOUNTER
----- Message from Bony Castaneda MD sent at 4/7/2022  3:47 PM CDT -----  Regarding: hospital follow-up  Hello,  Needs to schedule Isabell Matthews  in person hospital follow-up with me 1-2 weeks. If I don't have a POA slot available could add at 7:30 am on Monday 4/11 or 4/18. Please check with her daughter Maria Guadalupe what might work for them.  Best wishes,  Bony Castaneda MD

## 2022-04-12 LAB — BACTERIA UR CULT: NORMAL

## 2022-04-18 NOTE — TELEPHONE ENCOUNTER
Spoke with pt's daughter and confirmed appts with APPs and then subsequently with Dr. Soto.    Rita Duque BS, RN, SCRN  RN Stroke Neurology Care Coordinator  [facility] Neuroscience Service Line

## 2022-04-18 NOTE — TELEPHONE ENCOUNTER
Multiple attempts to confirm scheduled visit. Routing to stroke RNCC to advise on next steps.    JAZMYNE LOPEZ, CMA

## 2022-04-20 ENCOUNTER — NURSE TRIAGE (OUTPATIENT)
Dept: NURSING | Facility: CLINIC | Age: 62
End: 2022-04-20
Payer: MEDICARE

## 2022-04-20 ENCOUNTER — TELEPHONE (OUTPATIENT)
Dept: FAMILY MEDICINE | Facility: CLINIC | Age: 62
End: 2022-04-20
Payer: MEDICARE

## 2022-04-20 NOTE — TELEPHONE ENCOUNTER
Nurse Triage SUNITHA Bateman ( first visit for this PHN) reporting BP concerns    Situation: Low BP today, responded to fluids with electrolytes. PHN is reporting.  - BP 85/67- after fluids BP= 105/74  -She hasn't taken her meds for lowering BP( has 3)  PHN plans twice weekly visit, next is Friday.  -Caregiver is family - daughter Trisha- slightly overwhelmed.   -She does have BP cuff and will check BP later today also.  Patient has been groggy, sleeping a lot since discharge    Background:    Recent hx CVA 4/5/22-4/7/22 admit [facility] frontopperital punctate stroke  ED 4/10/22 for increasing weakness> hypoglycemia  PMH: -Extensive small vessel disease causing prior lacunar stroke in the right basal ganglia and ICH in the left basal ganglia.    -Seizure disorder.    -Essential hypertension.    -DM.    -HLD.    -CAD.    Assessment: Reporting low BP in AM with response to fluids. PHN states instructed her to hold BP meds for now:  Coreg 6.25 BID  Cardura 1 mg at HS  Nifedipine 60 mg daily    No report of abnormal FBG. On insulin  At discharge, TCU recommended and this was declined. Dr Castaneda appt scheduled 4/25/22      Protocol Recommended Disposition:   No disposition on file.    Recommendation:   Review- PHN reports BP  hold medications due to low BP today?  BP 85/67- after fluids BP= 105/74 this AM. PHN no longer at home visit.  Coreg 6.25 BID  Cardura 1 mg at HS  Nifedipine 60 mg daily Guidelines on hold if warranted, recent  Admit [facility] CVA 4/5/22   Next appt 4/25/22 Leonel    Routed to provider    Does the patient meet one of the following criteria for ADS visit consideration? No        Reason for Disposition    Brief dizziness or lightheadedness after standing up or eating    Additional Information    Negative: Systolic BP < 80 and NOT dizzy, lightheaded or weak (feels normal)    Negative: Abdominal pain    Negative: Major surgery in the past month    Negative: Fever > 100.4 F (38.0 C)    Negative:  "Drinking very little and has signs of dehydration (e.g., no urine > 12 hours, very dry mouth, very lightheaded)    Negative: Bleeding (e.g., vomiting blood, rectal bleeding or tarry stools, severe vaginal bleeding)    Negative: Extra heart beats or heart is beating fast  (i.e., \"palpitations\")    Negative: Chest pain    Negative: Fainted    Negative: Fall in systolic BP > 20 mm Hg from normal and feeling dizzy, lightheaded, or weak    Negative: Patient sounds very sick or weak to the triager    Negative: Fall in systolic BP > 20 mm Hg from normal and NOT dizzy, lightheaded, or weak    Negative: Fall in systolic BP > 20 mmHg after standing up    Negative: Fall in systolic BP > 20 mmHg after eating a meal    Negative: Diastolic BP < 50 mm Hg    Protocols used: LOW BLOOD PRESSURE-A-OH      "

## 2022-04-20 NOTE — TELEPHONE ENCOUNTER
[facility] Call Center    Phone Message    May a detailed message be left on voicemail: yes     Reason for Call: Order(s): Home Care Orders: Skilled Nursin x week for 3 weeks. PT: 1 x week for 1 week. 2 x week for 5 weeks. OT: 1 x week for 1 week. 2 x week for 2 weeks. 1 x week for 1 week.    Smita #: [phone]    Action Taken: Message routed to:  [facility] P[facility]    Travel Screening: Not Applicable

## 2022-04-20 NOTE — TELEPHONE ENCOUNTER
PCP will follow pt for home care. Pt will need to keep appt with PCP in order to continue receiving home care orders. Veronica Li LPN 4/20/2022 1:37 PM

## 2022-04-20 NOTE — TELEPHONE ENCOUNTER
[facility] Call Center    Phone Message    May a detailed message be left on voicemail: yes     Reason for Call: Order(s): Home Care Orders: Other: Would Dr Medina be ok with following the patient for homecare? Please call to discuss further.    Action Taken: Message routed to:  [facility]    Travel Screening: Not Applicable

## 2022-04-20 NOTE — TELEPHONE ENCOUNTER
Verbal orders given to Smita from [facility], per Dr. Castaneda, for Order(s): Home Care Orders: Skilled Nursin x week for 3 weeks. PT: 1 x week for 1 week. 2 x week for 5 weeks. OT: 1 x week for 1 week. 2 x week for 2 weeks. 1 x week for 1 week. . Veronica Li LPN 2022 3:29 PM

## 2022-04-21 NOTE — TELEPHONE ENCOUNTER
RN called [facility], and left a message for Bela to call RN back at direct number.    Dorina Argueta RN on 4/21/2022 at 9:01 AM

## 2022-04-21 NOTE — TELEPHONE ENCOUNTER
Bela, PT provider with home care, called RN back and relayed that the home care RN will see patient tomorrow.  Bela will have home care RN call with update about how patient is doing after home care visit.    Dorina Argueta RN on 4/21/2022 at 11:47 AM

## 2022-04-21 NOTE — TELEPHONE ENCOUNTER
April 21, 2022 2:23 PM  I agree with hold BP medications for low BP less than 90/50.  Please give update on BP and patient status for 4/22/2022.  Best wishes,  Bony Castaneda MD

## 2022-04-22 VITALS
HEIGHT: 65 IN | WEIGHT: 160 LBS | OXYGEN SATURATION: 96 % | SYSTOLIC BLOOD PRESSURE: 104 MMHG | HEART RATE: 90 BPM | BODY MASS INDEX: 26.66 KG/M2 | DIASTOLIC BLOOD PRESSURE: 82 MMHG | RESPIRATION RATE: 16 BRPM

## 2022-04-22 NOTE — TELEPHONE ENCOUNTER
RN received call from home care nurse, with B/P readings from yesterday and today.  Readings were entered into Epic flowsheet.  RN relayed Dr. Castaneda's message about holding B/P meds for low B/P less than 90/50.  Home care nurse reviewed next appt time and date for patient to see Dr. Castaneda in person on 4/25/22.     Dorina Argueta RN on 4/22/2022 at 11:55 AM

## 2022-04-25 NOTE — TELEPHONE ENCOUNTER
RN left voice messages for both patient's daughterMaria Guadalupe, and [facility] staff to call RN back at direct number.    Dorina Argueta RN on 4/25/2022 at 9:26 AM

## 2022-04-27 ENCOUNTER — MEDICAL CORRESPONDENCE (OUTPATIENT)
Dept: HEALTH INFORMATION MANAGEMENT | Facility: CLINIC | Age: 62
End: 2022-04-27
Payer: MEDICARE

## 2022-04-29 NOTE — TELEPHONE ENCOUNTER
RN left a voice message asking Maria Guadalupe to call [facility] back and let Dr. Castaneda know patient's recent B/P readings.  RN also relayed that patient should re-start Coreg at 3.125 mg BID, but not to resume other B/P meds until Dr. Castaneda has reviewed the B/P readings.      Dorina Argueta RN on 4/29/2022 at 3:54 PM

## 2022-05-03 ENCOUNTER — APPOINTMENT (OUTPATIENT)
Dept: CT IMAGING | Facility: CLINIC | Age: 62
DRG: 637 | End: 2022-05-03
Attending: EMERGENCY MEDICINE
Payer: MEDICARE

## 2022-05-03 ENCOUNTER — APPOINTMENT (OUTPATIENT)
Dept: GENERAL RADIOLOGY | Facility: CLINIC | Age: 62
DRG: 637 | End: 2022-05-03
Attending: EMERGENCY MEDICINE
Payer: MEDICARE

## 2022-05-03 ENCOUNTER — HOSPITAL ENCOUNTER (INPATIENT)
Facility: CLINIC | Age: 62
LOS: 11 days | Discharge: HOME HEALTH | DRG: 637 | End: 2022-05-14
Attending: EMERGENCY MEDICINE | Admitting: INTERNAL MEDICINE
Payer: MEDICARE

## 2022-05-03 ENCOUNTER — APPOINTMENT (OUTPATIENT)
Dept: ULTRASOUND IMAGING | Facility: CLINIC | Age: 62
DRG: 637 | End: 2022-05-03
Attending: INTERNAL MEDICINE
Payer: MEDICARE

## 2022-05-03 ENCOUNTER — APPOINTMENT (OUTPATIENT)
Dept: MRI IMAGING | Facility: CLINIC | Age: 62
DRG: 637 | End: 2022-05-03
Attending: INTERNAL MEDICINE
Payer: MEDICARE

## 2022-05-03 ENCOUNTER — APPOINTMENT (OUTPATIENT)
Dept: NEUROLOGY | Facility: CLINIC | Age: 62
DRG: 637 | End: 2022-05-03
Attending: INTERNAL MEDICINE
Payer: MEDICARE

## 2022-05-03 DIAGNOSIS — E11.42 TYPE 2 DIABETES MELLITUS WITH DIABETIC POLYNEUROPATHY, WITH LONG-TERM CURRENT USE OF INSULIN (H): Primary | ICD-10-CM

## 2022-05-03 DIAGNOSIS — Z79.4 TYPE 2 DIABETES MELLITUS WITH DIABETIC POLYNEUROPATHY, WITH LONG-TERM CURRENT USE OF INSULIN (H): Primary | ICD-10-CM

## 2022-05-03 DIAGNOSIS — Z20.822 CONTACT WITH AND (SUSPECTED) EXPOSURE TO COVID-19: ICD-10-CM

## 2022-05-03 DIAGNOSIS — R41.82 ALTERED MENTAL STATUS, UNSPECIFIED ALTERED MENTAL STATUS TYPE: ICD-10-CM

## 2022-05-03 DIAGNOSIS — R56.9 SEIZURES (H): ICD-10-CM

## 2022-05-03 DIAGNOSIS — B37.2 CANDIDIASIS OF SKIN: ICD-10-CM

## 2022-05-03 DIAGNOSIS — F01.50 VASCULAR DEMENTIA WITHOUT BEHAVIORAL DISTURBANCE (H): ICD-10-CM

## 2022-05-03 DIAGNOSIS — E10.10 DIABETIC KETOACIDOSIS WITHOUT COMA ASSOCIATED WITH TYPE 1 DIABETES MELLITUS (H): ICD-10-CM

## 2022-05-03 LAB
ALBUMIN SERPL-MCNC: 3.4 G/DL (ref 3.4–5)
ALBUMIN UR-MCNC: 50 MG/DL
ALP SERPL-CCNC: 221 U/L (ref 40–150)
ALT SERPL W P-5'-P-CCNC: 23 U/L (ref 0–50)
ANION GAP SERPL CALCULATED.3IONS-SCNC: 10 MMOL/L (ref 3–14)
ANION GAP SERPL CALCULATED.3IONS-SCNC: 15 MMOL/L (ref 3–14)
ANION GAP SERPL CALCULATED.3IONS-SCNC: 23 MMOL/L (ref 3–14)
ANION GAP SERPL CALCULATED.3IONS-SCNC: 8 MMOL/L (ref 3–14)
APPEARANCE UR: ABNORMAL
AST SERPL W P-5'-P-CCNC: 28 U/L (ref 0–45)
ATRIAL RATE - MUSE: 108 BPM
ATRIAL RATE - MUSE: 122 BPM
BACTERIA #/AREA URNS HPF: ABNORMAL /HPF
BASOPHILS # BLD AUTO: 0 10E3/UL (ref 0–0.2)
BASOPHILS # BLD AUTO: 0 10E3/UL (ref 0–0.2)
BASOPHILS NFR BLD AUTO: 0 %
BASOPHILS NFR BLD AUTO: 0 %
BILIRUB DIRECT SERPL-MCNC: <0.1 MG/DL (ref 0–0.2)
BILIRUB SERPL-MCNC: 0.7 MG/DL (ref 0.2–1.3)
BILIRUB UR QL STRIP: NEGATIVE
BUN SERPL-MCNC: 35 MG/DL (ref 7–30)
BUN SERPL-MCNC: 36 MG/DL (ref 7–30)
BUN SERPL-MCNC: 38 MG/DL (ref 7–30)
BUN SERPL-MCNC: 38 MG/DL (ref 7–30)
BUN SERPL-MCNC: 41 MG/DL (ref 7–30)
BUN SERPL-MCNC: 49 MG/DL (ref 7–30)
C PNEUM DNA SPEC QL NAA+PROBE: NOT DETECTED
CA-I BLD-MCNC: 5.3 MG/DL (ref 4.4–5.2)
CALCIUM SERPL-MCNC: 8.3 MG/DL (ref 8.5–10.1)
CALCIUM SERPL-MCNC: 8.3 MG/DL (ref 8.5–10.1)
CALCIUM SERPL-MCNC: 8.6 MG/DL (ref 8.5–10.1)
CALCIUM SERPL-MCNC: 8.6 MG/DL (ref 8.5–10.1)
CALCIUM SERPL-MCNC: 8.8 MG/DL (ref 8.5–10.1)
CALCIUM SERPL-MCNC: 9.5 MG/DL (ref 8.5–10.1)
CHLORIDE BLD-SCNC: 102 MMOL/L (ref 94–109)
CHLORIDE BLD-SCNC: 114 MMOL/L (ref 94–109)
CHLORIDE BLD-SCNC: 116 MMOL/L (ref 94–109)
CHLORIDE BLD-SCNC: 117 MMOL/L (ref 94–109)
CHLORIDE BLD-SCNC: 117 MMOL/L (ref 94–109)
CHLORIDE BLD-SCNC: 119 MMOL/L (ref 94–109)
CO2 SERPL-SCNC: 11 MMOL/L (ref 20–32)
CO2 SERPL-SCNC: 14 MMOL/L (ref 20–32)
CO2 SERPL-SCNC: 18 MMOL/L (ref 20–32)
CO2 SERPL-SCNC: 19 MMOL/L (ref 20–32)
CO2 SERPL-SCNC: 20 MMOL/L (ref 20–32)
CO2 SERPL-SCNC: 21 MMOL/L (ref 20–32)
COLOR UR AUTO: YELLOW
CPB POCT: NO
CREAT BLD-MCNC: 2 MG/DL (ref 0.5–1)
CREAT SERPL-MCNC: 1.42 MG/DL (ref 0.52–1.04)
CREAT SERPL-MCNC: 1.45 MG/DL (ref 0.52–1.04)
CREAT SERPL-MCNC: 1.48 MG/DL (ref 0.52–1.04)
CREAT SERPL-MCNC: 1.49 MG/DL (ref 0.52–1.04)
CREAT SERPL-MCNC: 1.61 MG/DL (ref 0.52–1.04)
CREAT SERPL-MCNC: 1.84 MG/DL (ref 0.52–1.04)
DIASTOLIC BLOOD PRESSURE - MUSE: NORMAL MMHG
DIASTOLIC BLOOD PRESSURE - MUSE: NORMAL MMHG
EOSINOPHIL # BLD AUTO: 0 10E3/UL (ref 0–0.7)
EOSINOPHIL # BLD AUTO: 0 10E3/UL (ref 0–0.7)
EOSINOPHIL NFR BLD AUTO: 0 %
EOSINOPHIL NFR BLD AUTO: 0 %
ERYTHROCYTE [DISTWIDTH] IN BLOOD BY AUTOMATED COUNT: 13.5 % (ref 10–15)
ERYTHROCYTE [DISTWIDTH] IN BLOOD BY AUTOMATED COUNT: 13.7 % (ref 10–15)
FLUAV H1 2009 PAND RNA SPEC QL NAA+PROBE: NOT DETECTED
FLUAV H1 RNA SPEC QL NAA+PROBE: NOT DETECTED
FLUAV H3 RNA SPEC QL NAA+PROBE: NOT DETECTED
FLUAV RNA SPEC QL NAA+PROBE: NEGATIVE
FLUAV RNA SPEC QL NAA+PROBE: NOT DETECTED
FLUBV RNA RESP QL NAA+PROBE: NEGATIVE
FLUBV RNA SPEC QL NAA+PROBE: NOT DETECTED
GFR SERPL CREATININE-BSD FRML MDRD: 27 ML/MIN/1.73M2
GFR SERPL CREATININE-BSD FRML MDRD: 30 ML/MIN/1.73M2
GFR SERPL CREATININE-BSD FRML MDRD: 36 ML/MIN/1.73M2
GFR SERPL CREATININE-BSD FRML MDRD: 39 ML/MIN/1.73M2
GFR SERPL CREATININE-BSD FRML MDRD: 39 ML/MIN/1.73M2
GFR SERPL CREATININE-BSD FRML MDRD: 40 ML/MIN/1.73M2
GFR SERPL CREATININE-BSD FRML MDRD: 41 ML/MIN/1.73M2
GLUCOSE BLD-MCNC: 106 MG/DL (ref 70–99)
GLUCOSE BLD-MCNC: 174 MG/DL (ref 70–99)
GLUCOSE BLD-MCNC: 219 MG/DL (ref 70–99)
GLUCOSE BLD-MCNC: 227 MG/DL (ref 70–99)
GLUCOSE BLD-MCNC: 362 MG/DL (ref 70–99)
GLUCOSE BLD-MCNC: 624 MG/DL (ref 70–99)
GLUCOSE BLD-MCNC: 686 MG/DL (ref 70–99)
GLUCOSE BLDC GLUCOMTR-MCNC: 114 MG/DL (ref 70–99)
GLUCOSE BLDC GLUCOMTR-MCNC: 118 MG/DL (ref 70–99)
GLUCOSE BLDC GLUCOMTR-MCNC: 121 MG/DL (ref 70–99)
GLUCOSE BLDC GLUCOMTR-MCNC: 135 MG/DL (ref 70–99)
GLUCOSE BLDC GLUCOMTR-MCNC: 143 MG/DL (ref 70–99)
GLUCOSE BLDC GLUCOMTR-MCNC: 170 MG/DL (ref 70–99)
GLUCOSE BLDC GLUCOMTR-MCNC: 176 MG/DL (ref 70–99)
GLUCOSE BLDC GLUCOMTR-MCNC: 179 MG/DL (ref 70–99)
GLUCOSE BLDC GLUCOMTR-MCNC: 223 MG/DL (ref 70–99)
GLUCOSE BLDC GLUCOMTR-MCNC: 242 MG/DL (ref 70–99)
GLUCOSE BLDC GLUCOMTR-MCNC: 242 MG/DL (ref 70–99)
GLUCOSE BLDC GLUCOMTR-MCNC: 247 MG/DL (ref 70–99)
GLUCOSE BLDC GLUCOMTR-MCNC: 257 MG/DL (ref 70–99)
GLUCOSE BLDC GLUCOMTR-MCNC: 319 MG/DL (ref 70–99)
GLUCOSE BLDC GLUCOMTR-MCNC: 367 MG/DL (ref 70–99)
GLUCOSE BLDC GLUCOMTR-MCNC: 373 MG/DL (ref 70–99)
GLUCOSE BLDC GLUCOMTR-MCNC: 428 MG/DL (ref 70–99)
GLUCOSE BLDC GLUCOMTR-MCNC: 482 MG/DL (ref 70–99)
GLUCOSE BLDC GLUCOMTR-MCNC: 579 MG/DL (ref 70–99)
GLUCOSE BLDC GLUCOMTR-MCNC: 95 MG/DL (ref 70–99)
GLUCOSE UR STRIP-MCNC: 1000 MG/DL
HADV DNA SPEC QL NAA+PROBE: NOT DETECTED
HBA1C MFR BLD: 8.9 % (ref 0–5.6)
HCO3 BLDV-SCNC: 11 MMOL/L (ref 21–28)
HCO3 BLDV-SCNC: 11 MMOL/L (ref 21–28)
HCOV PNL SPEC NAA+PROBE: NOT DETECTED
HCT VFR BLD AUTO: 34.2 % (ref 35–47)
HCT VFR BLD AUTO: 43.2 % (ref 35–47)
HCT VFR BLD CALC: 41 % (ref 35–47)
HGB BLD-MCNC: 10.6 G/DL (ref 11.7–15.7)
HGB BLD-MCNC: 12.7 G/DL (ref 11.7–15.7)
HGB BLD-MCNC: 13.9 G/DL (ref 11.7–15.7)
HGB UR QL STRIP: ABNORMAL
HMPV RNA SPEC QL NAA+PROBE: NOT DETECTED
HOLD SPECIMEN: NORMAL
HPIV1 RNA SPEC QL NAA+PROBE: NOT DETECTED
HPIV2 RNA SPEC QL NAA+PROBE: NOT DETECTED
HPIV3 RNA SPEC QL NAA+PROBE: NOT DETECTED
HPIV4 RNA SPEC QL NAA+PROBE: NOT DETECTED
IMM GRANULOCYTES # BLD: 0.1 10E3/UL
IMM GRANULOCYTES # BLD: 0.1 10E3/UL
IMM GRANULOCYTES NFR BLD: 1 %
IMM GRANULOCYTES NFR BLD: 1 %
INR BLD: 1.3 (ref 2–3)
INTERPRETATION ECG - MUSE: NORMAL
INTERPRETATION ECG - MUSE: NORMAL
KETONES BLD-SCNC: 5.4 MMOL/L (ref 0–0.6)
KETONES UR STRIP-MCNC: 100 MG/DL
LACTATE BLD-SCNC: 3.2 MMOL/L
LACTATE SERPL-SCNC: 1.3 MMOL/L (ref 0.7–2)
LEUKOCYTE ESTERASE UR QL STRIP: ABNORMAL
LYMPHOCYTES # BLD AUTO: 0.8 10E3/UL (ref 0.8–5.3)
LYMPHOCYTES # BLD AUTO: 1 10E3/UL (ref 0.8–5.3)
LYMPHOCYTES NFR BLD AUTO: 10 %
LYMPHOCYTES NFR BLD AUTO: 12 %
M PNEUMO DNA SPEC QL NAA+PROBE: NOT DETECTED
MAGNESIUM SERPL-MCNC: 2.4 MG/DL (ref 1.6–2.3)
MAGNESIUM SERPL-MCNC: 2.5 MG/DL (ref 1.6–2.3)
MCH RBC QN AUTO: 28 PG (ref 26.5–33)
MCH RBC QN AUTO: 28 PG (ref 26.5–33)
MCHC RBC AUTO-ENTMCNC: 29.4 G/DL (ref 31.5–36.5)
MCHC RBC AUTO-ENTMCNC: 31 G/DL (ref 31.5–36.5)
MCV RBC AUTO: 91 FL (ref 78–100)
MCV RBC AUTO: 95 FL (ref 78–100)
MONOCYTES # BLD AUTO: 0.1 10E3/UL (ref 0–1.3)
MONOCYTES # BLD AUTO: 0.4 10E3/UL (ref 0–1.3)
MONOCYTES NFR BLD AUTO: 2 %
MONOCYTES NFR BLD AUTO: 5 %
NEUTROPHILS # BLD AUTO: 6.9 10E3/UL (ref 1.6–8.3)
NEUTROPHILS # BLD AUTO: 7.3 10E3/UL (ref 1.6–8.3)
NEUTROPHILS NFR BLD AUTO: 82 %
NEUTROPHILS NFR BLD AUTO: 87 %
NITRATE UR QL: NEGATIVE
NRBC # BLD AUTO: 0 10E3/UL
NRBC # BLD AUTO: 0 10E3/UL
NRBC BLD AUTO-RTO: 0 /100
NRBC BLD AUTO-RTO: 0 /100
P AXIS - MUSE: 49 DEGREES
P AXIS - MUSE: 72 DEGREES
PCO2 BLDV: 32 MM HG (ref 40–50)
PCO2 BLDV: 32 MM HG (ref 40–50)
PH BLDV: 7.13 [PH] (ref 7.32–7.43)
PH BLDV: 7.14 [PH] (ref 7.32–7.43)
PH UR STRIP: 7 [PH] (ref 5–7)
PHOSPHATE SERPL-MCNC: 2 MG/DL (ref 2.5–4.5)
PHOSPHATE SERPL-MCNC: 2 MG/DL (ref 2.5–4.5)
PHOSPHATE SERPL-MCNC: 2.2 MG/DL (ref 2.5–4.5)
PHOSPHATE SERPL-MCNC: 3 MG/DL (ref 2.5–4.5)
PHOSPHATE SERPL-MCNC: 3.2 MG/DL (ref 2.5–4.5)
PHOSPHATE SERPL-MCNC: 6.8 MG/DL (ref 2.5–4.5)
PLATELET # BLD AUTO: 209 10E3/UL (ref 150–450)
PLATELET # BLD AUTO: 239 10E3/UL (ref 150–450)
PO2 BLDV: 31 MM HG (ref 25–47)
PO2 BLDV: 32 MM HG (ref 25–47)
POTASSIUM BLD-SCNC: 3.4 MMOL/L (ref 3.4–5.3)
POTASSIUM BLD-SCNC: 3.5 MMOL/L (ref 3.4–5.3)
POTASSIUM BLD-SCNC: 3.6 MMOL/L (ref 3.4–5.3)
POTASSIUM BLD-SCNC: 3.6 MMOL/L (ref 3.4–5.3)
POTASSIUM BLD-SCNC: 4.1 MMOL/L (ref 3.4–5.3)
POTASSIUM BLD-SCNC: 5.5 MMOL/L (ref 3.4–5.3)
POTASSIUM BLD-SCNC: 5.6 MMOL/L (ref 3.4–5.3)
PR INTERVAL - MUSE: 134 MS
PR INTERVAL - MUSE: 144 MS
PROT SERPL-MCNC: 7.7 G/DL (ref 6.8–8.8)
QRS DURATION - MUSE: 86 MS
QRS DURATION - MUSE: 90 MS
QT - MUSE: 336 MS
QT - MUSE: 370 MS
QTC - MUSE: 478 MS
QTC - MUSE: 495 MS
R AXIS - MUSE: 56 DEGREES
R AXIS - MUSE: 61 DEGREES
RBC # BLD AUTO: 3.78 10E6/UL (ref 3.8–5.2)
RBC # BLD AUTO: 4.54 10E6/UL (ref 3.8–5.2)
RBC URINE: 6 /HPF
RSV RNA SPEC NAA+PROBE: NEGATIVE
RSV RNA SPEC QL NAA+PROBE: NOT DETECTED
RSV RNA SPEC QL NAA+PROBE: NOT DETECTED
RV+EV RNA SPEC QL NAA+PROBE: NOT DETECTED
SAO2 % BLDV: 43 % (ref 94–100)
SAO2 % BLDV: 46 % (ref 94–100)
SARS-COV-2 RNA RESP QL NAA+PROBE: NEGATIVE
SODIUM BLD-SCNC: 137 MMOL/L (ref 133–144)
SODIUM SERPL-SCNC: 136 MMOL/L (ref 133–144)
SODIUM SERPL-SCNC: 143 MMOL/L (ref 133–144)
SODIUM SERPL-SCNC: 144 MMOL/L (ref 133–144)
SODIUM SERPL-SCNC: 144 MMOL/L (ref 133–144)
SODIUM SERPL-SCNC: 145 MMOL/L (ref 133–144)
SODIUM SERPL-SCNC: 148 MMOL/L (ref 133–144)
SP GR UR STRIP: 1.02 (ref 1–1.03)
SQUAMOUS EPITHELIAL: <1 /HPF
SYSTOLIC BLOOD PRESSURE - MUSE: NORMAL MMHG
SYSTOLIC BLOOD PRESSURE - MUSE: NORMAL MMHG
T AXIS - MUSE: 43 DEGREES
T AXIS - MUSE: 57 DEGREES
UROBILINOGEN UR STRIP-MCNC: 2 MG/DL
VENTRICULAR RATE- MUSE: 108 BPM
VENTRICULAR RATE- MUSE: 122 BPM
WBC # BLD AUTO: 8.3 10E3/UL (ref 4–11)
WBC # BLD AUTO: 8.3 10E3/UL (ref 4–11)
WBC URINE: 15 /HPF

## 2022-05-03 PROCEDURE — G1004 CDSM NDSC: HCPCS | Mod: GC | Performed by: RADIOLOGY

## 2022-05-03 PROCEDURE — 87486 CHLMYD PNEUM DNA AMP PROBE: CPT | Performed by: STUDENT IN AN ORGANIZED HEALTH CARE EDUCATION/TRAINING PROGRAM

## 2022-05-03 PROCEDURE — 82947 ASSAY GLUCOSE BLOOD QUANT: CPT | Performed by: EMERGENCY MEDICINE

## 2022-05-03 PROCEDURE — 96366 THER/PROPH/DIAG IV INF ADDON: CPT | Performed by: EMERGENCY MEDICINE

## 2022-05-03 PROCEDURE — 95711 VEEG 2-12 HR UNMONITORED: CPT

## 2022-05-03 PROCEDURE — 87633 RESP VIRUS 12-25 TARGETS: CPT | Performed by: STUDENT IN AN ORGANIZED HEALTH CARE EDUCATION/TRAINING PROGRAM

## 2022-05-03 PROCEDURE — 85025 COMPLETE CBC W/AUTO DIFF WBC: CPT | Performed by: STUDENT IN AN ORGANIZED HEALTH CARE EDUCATION/TRAINING PROGRAM

## 2022-05-03 PROCEDURE — 93010 ELECTROCARDIOGRAM REPORT: CPT | Performed by: EMERGENCY MEDICINE

## 2022-05-03 PROCEDURE — 258N000003 HC RX IP 258 OP 636: Performed by: EMERGENCY MEDICINE

## 2022-05-03 PROCEDURE — 250N000009 HC RX 250: Performed by: STUDENT IN AN ORGANIZED HEALTH CARE EDUCATION/TRAINING PROGRAM

## 2022-05-03 PROCEDURE — 36415 COLL VENOUS BLD VENIPUNCTURE: CPT | Performed by: EMERGENCY MEDICINE

## 2022-05-03 PROCEDURE — 95718 EEG PHYS/QHP 2-12 HR W/VEEG: CPT | Performed by: PSYCHIATRY & NEUROLOGY

## 2022-05-03 PROCEDURE — 82330 ASSAY OF CALCIUM: CPT

## 2022-05-03 PROCEDURE — 83605 ASSAY OF LACTIC ACID: CPT | Performed by: STUDENT IN AN ORGANIZED HEALTH CARE EDUCATION/TRAINING PROGRAM

## 2022-05-03 PROCEDURE — 85025 COMPLETE CBC W/AUTO DIFF WBC: CPT | Performed by: EMERGENCY MEDICINE

## 2022-05-03 PROCEDURE — 250N000009 HC RX 250: Performed by: EMERGENCY MEDICINE

## 2022-05-03 PROCEDURE — G1004 CDSM NDSC: HCPCS

## 2022-05-03 PROCEDURE — 82310 ASSAY OF CALCIUM: CPT | Performed by: STUDENT IN AN ORGANIZED HEALTH CARE EDUCATION/TRAINING PROGRAM

## 2022-05-03 PROCEDURE — 250N000011 HC RX IP 250 OP 636: Performed by: EMERGENCY MEDICINE

## 2022-05-03 PROCEDURE — 81001 URINALYSIS AUTO W/SCOPE: CPT | Performed by: EMERGENCY MEDICINE

## 2022-05-03 PROCEDURE — 99291 CRITICAL CARE FIRST HOUR: CPT | Mod: GC | Performed by: PSYCHIATRY & NEUROLOGY

## 2022-05-03 PROCEDURE — 70553 MRI BRAIN STEM W/O & W/DYE: CPT | Mod: 26 | Performed by: RADIOLOGY

## 2022-05-03 PROCEDURE — 82947 ASSAY GLUCOSE BLOOD QUANT: CPT | Performed by: STUDENT IN AN ORGANIZED HEALTH CARE EDUCATION/TRAINING PROGRAM

## 2022-05-03 PROCEDURE — C9803 HOPD COVID-19 SPEC COLLECT: HCPCS | Performed by: EMERGENCY MEDICINE

## 2022-05-03 PROCEDURE — 70496 CT ANGIOGRAPHY HEAD: CPT | Mod: 26 | Performed by: RADIOLOGY

## 2022-05-03 PROCEDURE — 99285 EMERGENCY DEPT VISIT HI MDM: CPT | Mod: 25 | Performed by: EMERGENCY MEDICINE

## 2022-05-03 PROCEDURE — 82565 ASSAY OF CREATININE: CPT

## 2022-05-03 PROCEDURE — 258N000003 HC RX IP 258 OP 636: Performed by: STUDENT IN AN ORGANIZED HEALTH CARE EDUCATION/TRAINING PROGRAM

## 2022-05-03 PROCEDURE — 96365 THER/PROPH/DIAG IV INF INIT: CPT | Mod: 59 | Performed by: EMERGENCY MEDICINE

## 2022-05-03 PROCEDURE — 83735 ASSAY OF MAGNESIUM: CPT | Performed by: STUDENT IN AN ORGANIZED HEALTH CARE EDUCATION/TRAINING PROGRAM

## 2022-05-03 PROCEDURE — 82010 KETONE BODYS QUAN: CPT | Performed by: EMERGENCY MEDICINE

## 2022-05-03 PROCEDURE — 85610 PROTHROMBIN TIME: CPT

## 2022-05-03 PROCEDURE — 70553 MRI BRAIN STEM W/O & W/DYE: CPT | Mod: ME

## 2022-05-03 PROCEDURE — 70496 CT ANGIOGRAPHY HEAD: CPT | Mod: MG

## 2022-05-03 PROCEDURE — 87040 BLOOD CULTURE FOR BACTERIA: CPT | Performed by: EMERGENCY MEDICINE

## 2022-05-03 PROCEDURE — 82310 ASSAY OF CALCIUM: CPT | Performed by: EMERGENCY MEDICINE

## 2022-05-03 PROCEDURE — 99223 1ST HOSP IP/OBS HIGH 75: CPT | Mod: AI | Performed by: INTERNAL MEDICINE

## 2022-05-03 PROCEDURE — 96368 THER/DIAG CONCURRENT INF: CPT | Performed by: EMERGENCY MEDICINE

## 2022-05-03 PROCEDURE — 999N000176 HC STATISTIC STROKE CODE W/O ACCESS

## 2022-05-03 PROCEDURE — 93880 EXTRACRANIAL BILAT STUDY: CPT | Mod: 26 | Performed by: RADIOLOGY

## 2022-05-03 PROCEDURE — 83735 ASSAY OF MAGNESIUM: CPT | Performed by: EMERGENCY MEDICINE

## 2022-05-03 PROCEDURE — 70498 CT ANGIOGRAPHY NECK: CPT | Mod: 26 | Performed by: RADIOLOGY

## 2022-05-03 PROCEDURE — 87086 URINE CULTURE/COLONY COUNT: CPT | Performed by: EMERGENCY MEDICINE

## 2022-05-03 PROCEDURE — 93880 EXTRACRANIAL BILAT STUDY: CPT

## 2022-05-03 PROCEDURE — 36415 COLL VENOUS BLD VENIPUNCTURE: CPT | Performed by: STUDENT IN AN ORGANIZED HEALTH CARE EDUCATION/TRAINING PROGRAM

## 2022-05-03 PROCEDURE — 999N000285 HC STATISTIC VASC ACCESS LAB DRAW WITH PIV START

## 2022-05-03 PROCEDURE — 83036 HEMOGLOBIN GLYCOSYLATED A1C: CPT | Performed by: EMERGENCY MEDICINE

## 2022-05-03 PROCEDURE — 93005 ELECTROCARDIOGRAM TRACING: CPT | Performed by: EMERGENCY MEDICINE

## 2022-05-03 PROCEDURE — G1004 CDSM NDSC: HCPCS | Performed by: RADIOLOGY

## 2022-05-03 PROCEDURE — 120N000003 HC R&B IMCU UMMC

## 2022-05-03 PROCEDURE — A9585 GADOBUTROL INJECTION: HCPCS | Performed by: INTERNAL MEDICINE

## 2022-05-03 PROCEDURE — 71045 X-RAY EXAM CHEST 1 VIEW: CPT

## 2022-05-03 PROCEDURE — 258N000001 HC RX 258: Performed by: STUDENT IN AN ORGANIZED HEALTH CARE EDUCATION/TRAINING PROGRAM

## 2022-05-03 PROCEDURE — 71045 X-RAY EXAM CHEST 1 VIEW: CPT | Mod: 26 | Performed by: RADIOLOGY

## 2022-05-03 PROCEDURE — 250N000011 HC RX IP 250 OP 636: Performed by: STUDENT IN AN ORGANIZED HEALTH CARE EDUCATION/TRAINING PROGRAM

## 2022-05-03 PROCEDURE — 82803 BLOOD GASES ANY COMBINATION: CPT

## 2022-05-03 PROCEDURE — 255N000002 HC RX 255 OP 636: Performed by: INTERNAL MEDICINE

## 2022-05-03 PROCEDURE — 87637 SARSCOV2&INF A&B&RSV AMP PRB: CPT | Performed by: EMERGENCY MEDICINE

## 2022-05-03 PROCEDURE — 82248 BILIRUBIN DIRECT: CPT | Performed by: STUDENT IN AN ORGANIZED HEALTH CARE EDUCATION/TRAINING PROGRAM

## 2022-05-03 PROCEDURE — 84100 ASSAY OF PHOSPHORUS: CPT | Performed by: STUDENT IN AN ORGANIZED HEALTH CARE EDUCATION/TRAINING PROGRAM

## 2022-05-03 PROCEDURE — 999N000248 HC STATISTIC IV INSERT WITH US BY RN

## 2022-05-03 PROCEDURE — 99291 CRITICAL CARE FIRST HOUR: CPT | Mod: 25 | Performed by: EMERGENCY MEDICINE

## 2022-05-03 RX ORDER — GADOBUTROL 604.72 MG/ML
7.5 INJECTION INTRAVENOUS ONCE
Status: COMPLETED | OUTPATIENT
Start: 2022-05-03 | End: 2022-05-03

## 2022-05-03 RX ORDER — PIPERACILLIN SODIUM, TAZOBACTAM SODIUM 2; .25 G/10ML; G/10ML
2.25 INJECTION, POWDER, LYOPHILIZED, FOR SOLUTION INTRAVENOUS EVERY 6 HOURS
Status: DISCONTINUED | OUTPATIENT
Start: 2022-05-03 | End: 2022-05-04

## 2022-05-03 RX ORDER — LEVETIRACETAM 5 MG/ML
500 INJECTION INTRAVASCULAR 2 TIMES DAILY
Status: DISCONTINUED | OUTPATIENT
Start: 2022-05-03 | End: 2022-05-11

## 2022-05-03 RX ORDER — DEXTROSE MONOHYDRATE 25 G/50ML
25-50 INJECTION, SOLUTION INTRAVENOUS
Status: DISCONTINUED | OUTPATIENT
Start: 2022-05-03 | End: 2022-05-14 | Stop reason: HOSPADM

## 2022-05-03 RX ORDER — NICOTINE POLACRILEX 4 MG
15-30 LOZENGE BUCCAL
Status: DISCONTINUED | OUTPATIENT
Start: 2022-05-03 | End: 2022-05-03

## 2022-05-03 RX ORDER — HEPARIN SODIUM 5000 [USP'U]/.5ML
5000 INJECTION, SOLUTION INTRAVENOUS; SUBCUTANEOUS 2 TIMES DAILY
Status: DISCONTINUED | OUTPATIENT
Start: 2022-05-03 | End: 2022-05-14 | Stop reason: HOSPADM

## 2022-05-03 RX ORDER — HYDRALAZINE HYDROCHLORIDE 20 MG/ML
10 INJECTION INTRAMUSCULAR; INTRAVENOUS EVERY 6 HOURS PRN
Status: DISCONTINUED | OUTPATIENT
Start: 2022-05-03 | End: 2022-05-14 | Stop reason: HOSPADM

## 2022-05-03 RX ORDER — AMOXICILLIN 250 MG
2 CAPSULE ORAL 2 TIMES DAILY PRN
Status: DISCONTINUED | OUTPATIENT
Start: 2022-05-03 | End: 2022-05-14 | Stop reason: HOSPADM

## 2022-05-03 RX ORDER — DEXTROSE MONOHYDRATE 100 MG/ML
INJECTION, SOLUTION INTRAVENOUS CONTINUOUS PRN
Status: DISCONTINUED | OUTPATIENT
Start: 2022-05-03 | End: 2022-05-14 | Stop reason: HOSPADM

## 2022-05-03 RX ORDER — PREGABALIN 75 MG/1
75 CAPSULE ORAL 2 TIMES DAILY
Status: DISCONTINUED | OUTPATIENT
Start: 2022-05-03 | End: 2022-05-05

## 2022-05-03 RX ORDER — ONDANSETRON 2 MG/ML
4 INJECTION INTRAMUSCULAR; INTRAVENOUS EVERY 6 HOURS PRN
Status: DISCONTINUED | OUTPATIENT
Start: 2022-05-03 | End: 2022-05-14 | Stop reason: HOSPADM

## 2022-05-03 RX ORDER — AMOXICILLIN 250 MG
1 CAPSULE ORAL 2 TIMES DAILY PRN
Status: DISCONTINUED | OUTPATIENT
Start: 2022-05-03 | End: 2022-05-14 | Stop reason: HOSPADM

## 2022-05-03 RX ORDER — IOPAMIDOL 755 MG/ML
75 INJECTION, SOLUTION INTRAVASCULAR ONCE
Status: COMPLETED | OUTPATIENT
Start: 2022-05-03 | End: 2022-05-03

## 2022-05-03 RX ORDER — CARVEDILOL 6.25 MG/1
6.25 TABLET ORAL 2 TIMES DAILY WITH MEALS
Status: DISCONTINUED | OUTPATIENT
Start: 2022-05-03 | End: 2022-05-14 | Stop reason: HOSPADM

## 2022-05-03 RX ORDER — LIDOCAINE 40 MG/G
CREAM TOPICAL
Status: DISCONTINUED | OUTPATIENT
Start: 2022-05-03 | End: 2022-05-14 | Stop reason: HOSPADM

## 2022-05-03 RX ORDER — DOXAZOSIN 1 MG/1
1 TABLET ORAL AT BEDTIME
Status: DISCONTINUED | OUTPATIENT
Start: 2022-05-04 | End: 2022-05-05

## 2022-05-03 RX ORDER — LEVOTHYROXINE SODIUM 88 UG/1
88 TABLET ORAL DAILY
Status: DISCONTINUED | OUTPATIENT
Start: 2022-05-03 | End: 2022-05-05

## 2022-05-03 RX ORDER — LEVETIRACETAM 500 MG/1
500 TABLET ORAL 2 TIMES DAILY
Status: DISCONTINUED | OUTPATIENT
Start: 2022-05-03 | End: 2022-05-14 | Stop reason: HOSPADM

## 2022-05-03 RX ORDER — DULOXETIN HYDROCHLORIDE 20 MG/1
20 CAPSULE, DELAYED RELEASE ORAL DAILY
Status: DISCONTINUED | OUTPATIENT
Start: 2022-05-03 | End: 2022-05-05

## 2022-05-03 RX ORDER — DEXTROSE MONOHYDRATE 25 G/50ML
25-50 INJECTION, SOLUTION INTRAVENOUS
Status: DISCONTINUED | OUTPATIENT
Start: 2022-05-03 | End: 2022-05-03

## 2022-05-03 RX ORDER — ONDANSETRON 4 MG/1
4 TABLET, ORALLY DISINTEGRATING ORAL EVERY 6 HOURS PRN
Status: DISCONTINUED | OUTPATIENT
Start: 2022-05-03 | End: 2022-05-14 | Stop reason: HOSPADM

## 2022-05-03 RX ORDER — ATORVASTATIN CALCIUM 40 MG/1
40 TABLET, FILM COATED ORAL DAILY
Status: DISCONTINUED | OUTPATIENT
Start: 2022-05-03 | End: 2022-05-05

## 2022-05-03 RX ORDER — PIPERACILLIN SODIUM, TAZOBACTAM SODIUM 3; .375 G/15ML; G/15ML
3.38 INJECTION, POWDER, LYOPHILIZED, FOR SOLUTION INTRAVENOUS ONCE
Status: DISCONTINUED | OUTPATIENT
Start: 2022-05-03 | End: 2022-05-03

## 2022-05-03 RX ORDER — VITAMIN B COMPLEX
25 TABLET ORAL DAILY
Status: DISCONTINUED | OUTPATIENT
Start: 2022-05-03 | End: 2022-05-05

## 2022-05-03 RX ORDER — ASPIRIN 81 MG/1
81 TABLET, CHEWABLE ORAL DAILY
Status: DISCONTINUED | OUTPATIENT
Start: 2022-05-03 | End: 2022-05-05

## 2022-05-03 RX ORDER — LANOLIN ALCOHOL/MO/W.PET/CERES
1000 CREAM (GRAM) TOPICAL DAILY
Status: DISCONTINUED | OUTPATIENT
Start: 2022-05-03 | End: 2022-05-05

## 2022-05-03 RX ORDER — NICOTINE POLACRILEX 4 MG
15-30 LOZENGE BUCCAL
Status: DISCONTINUED | OUTPATIENT
Start: 2022-05-03 | End: 2022-05-14 | Stop reason: HOSPADM

## 2022-05-03 RX ADMIN — HEPARIN SODIUM 5000 UNITS: 5000 INJECTION, SOLUTION INTRAVENOUS; SUBCUTANEOUS at 19:43

## 2022-05-03 RX ADMIN — SODIUM CHLORIDE 1000 ML: 900 INJECTION, SOLUTION INTRAVENOUS at 05:30

## 2022-05-03 RX ADMIN — HUMAN INSULIN 10 UNITS/HR: 100 INJECTION, SOLUTION SUBCUTANEOUS at 22:45

## 2022-05-03 RX ADMIN — PIPERACILLIN AND TAZOBACTAM 2.25 G: 2; .25 INJECTION, POWDER, FOR SOLUTION INTRAVENOUS at 19:00

## 2022-05-03 RX ADMIN — SODIUM CHLORIDE 1000 ML: 900 INJECTION, SOLUTION INTRAVENOUS at 04:25

## 2022-05-03 RX ADMIN — DEXTROSE MONOHYDRATE 1000 ML: 100 INJECTION, SOLUTION INTRAVENOUS at 18:34

## 2022-05-03 RX ADMIN — PIPERACILLIN AND TAZOBACTAM 2.25 G: 2; .25 INJECTION, POWDER, FOR SOLUTION INTRAVENOUS at 06:30

## 2022-05-03 RX ADMIN — PIPERACILLIN AND TAZOBACTAM 2.25 G: 2; .25 INJECTION, POWDER, FOR SOLUTION INTRAVENOUS at 23:11

## 2022-05-03 RX ADMIN — GADOBUTROL 6.5 ML: 604.72 INJECTION INTRAVENOUS at 18:12

## 2022-05-03 RX ADMIN — HUMAN INSULIN 5.5 UNITS/HR: 100 INJECTION, SOLUTION SUBCUTANEOUS at 03:04

## 2022-05-03 RX ADMIN — SODIUM CHLORIDE 1000 ML: 9 INJECTION, SOLUTION INTRAVENOUS at 02:54

## 2022-05-03 RX ADMIN — LEVETIRACETAM 500 MG: 5 INJECTION INTRAVENOUS at 07:35

## 2022-05-03 RX ADMIN — IOPAMIDOL 75 ML: 755 INJECTION, SOLUTION INTRAVENOUS at 02:52

## 2022-05-03 RX ADMIN — HEPARIN SODIUM 5000 UNITS: 5000 INJECTION, SOLUTION INTRAVENOUS; SUBCUTANEOUS at 09:31

## 2022-05-03 RX ADMIN — HUMAN INSULIN 0.2 UNITS/HR: 100 INJECTION, SOLUTION SUBCUTANEOUS at 18:33

## 2022-05-03 RX ADMIN — LEVETIRACETAM 500 MG: 5 INJECTION INTRAVENOUS at 19:43

## 2022-05-03 RX ADMIN — Medication 1250 MG: at 04:46

## 2022-05-03 RX ADMIN — PIPERACILLIN AND TAZOBACTAM 2.25 G: 2; .25 INJECTION, POWDER, FOR SOLUTION INTRAVENOUS at 11:41

## 2022-05-03 ASSESSMENT — ACTIVITIES OF DAILY LIVING (ADL)
ADLS_ACUITY_SCORE: 12
ADLS_ACUITY_SCORE: 11
ADLS_ACUITY_SCORE: 29
ADLS_ACUITY_SCORE: 12
ADLS_ACUITY_SCORE: 12
ADLS_ACUITY_SCORE: 25
ADLS_ACUITY_SCORE: 12
ADLS_ACUITY_SCORE: 11
ADLS_ACUITY_SCORE: 12
ADLS_ACUITY_SCORE: 12
ADLS_ACUITY_SCORE: 25
ADLS_ACUITY_SCORE: 25
ADLS_ACUITY_SCORE: 12
ADLS_ACUITY_SCORE: 12
ADLS_ACUITY_SCORE: 11

## 2022-05-03 ASSESSMENT — VISUAL ACUITY
OU: OTHER (SEE COMMENT)

## 2022-05-03 NOTE — CONSULTS
[facility]    Stroke Consult Note    Reason for Consult: Stroke Code     Chief Complaint: Hyperglycemia      HPI  Ms. Isabell Matthews is a 61 year old woman with a past medical history of hypertension, hyperlipidemia, CAD status post PCI, CKD, diabetes mellitus type 2, history of DKA, hypothyroidism, multiple prior ischemic strokes, history of ICH, recent right frontoparietal stroke in April 2022, seizure disorder (on Keppra 500 twice daily), reportedly poor cognitive baseline 2/2 vascular dementia.    She presented to the [facility] emergency department on 5/3 for depressed mental status and hyperglycemia.  Stroke code was called.    Patient not able to provide history due to mental status so majority of history was obtained from EMR for collateral history over the phone from daughter (Vishal).  Vishal is Ms. Matthews's caretaker, and lives with her.  Vishal has been sick recently with an upper respiratory infection, coughing, febrile.  She notes that Ms. Matthews had been somewhat more lethargic on 5/2 than typical for her.  She had not eaten much at mealtimes and thus Vishal reduced the dose of insulin as she normally gives her mom.  Last normal was approximately 9 PM on 5/2, Vishal noted that she set her mom up in her bedroom and she appeared to be in her typical state of health aside from some increased fatigue and then took a nap.  Upon awakening and checking on her mom around 1130 or midnight Ms. Matthews was noted to have more incontinence than usual, was very lethargic, was not responding verbally.  Vishal checked her blood sugar and noted that it was high.  She subsequently gave 10 units of Lantus and 5 units of short acting insulin and called EMS.  No focal deficits or new weakness reported per daughter.  No witnessed seizure-like events similar to her previous seizures (unilateral motor seizures).    On my assessment Ms. Matthews is awake and able to look around the room.   She is not verbally responsive to any questions however.  She is not following any commands.  She withdraws all extremities to noxious stimuli.  Noted spasticity bilateral upper extremities and bilateral lower extremities with right worse than left in the lower extremities.  Upgoing toes bilaterally.  NIHSS 20 (largely confounded by mental status).    Initial blood glucose over 500, with repeat at 624.  Lactic acid returned at 3.2 blood gas pH 7.14.  Hyperkalemic with K5.5.  Creatinine elevated with POC 2.0.  Ketone positive.    Imaging Findings  CT CTA performed.  No evidence of acute hemorrhage or mass lesion.  No clear areas of evolving hypodensity concerning for ongoing ischemic infarct.  Redemonstrated areas of chronic bilateral thalamic lacunar infarcts as well as chronic right frontoparietal infarct.  CTA redemonstrated moderate to severe right and left ICA stenosis, right M2 moderate to severe stenosis, as well as left P2 severe stenosis (multifocal).  No clear occlusion or significant change from most recent imaging.    Intravenous Thrombolysis  Not given due to:   - history of intracranial hemorrhage    Endovascular Treatment  Not initiated due to absence of proximal vessel occlusion    Impression   Ms. Matthews is a 61-year-old woman with a history of multiple lacunar and punctate ischemic infarcts as well as multifocal moderate to severe stenosis in multiple vascular territories who presents with altered mental status in the setting of DKA and lactic acidosis.  Of note her caretaker has been ill with respiratory illness recently.  Additionally she did not receive a typical doses of insulin evening 5/1 and daytime 5/2 due to decreased appetite and concern for precipitate hypoglycemia per daughter.    CT and CTA head imaging did not demonstrate clear new occlusive lesion or developing area of hypodensity concerning for stroke as primary etiology.  At this time most likely etiology is toxic metabolic.   Recommend aggressive treatment of DKA and electrolyte abnormalities, and work-up for other complicating factors including respiratory illness, urinary tract infection, etc.    Recommendations  -can consider MRI brain when clinically feasible  -delirium precautions    Patient Follow-up    - final recommendation pending work-up    Thank you for this consult. We will continue to follow.     The patient was discussed with Stroke Fellow, Dr. Zamora.  The Stroke Staff is Dr. Macias.    Amarilys Kolb MD  Neurology Resident    Addendum:   -Discussed with primary medical team - with concerns for left gaze deviation and spasticity (although on our exam, spasticity seemed to be more consistent with resistance v.s. catatonia than true spasticity)  -Agree with MRI with and without contrast - not obvious acute intracranial pathology  -Agree with video EEG     [facility]   Neurology PGY1   ______________________________________________________    Clinically Significant Risk Factors Present on Admission              # Coagulation Defect: INR = 1.3 (Ref range: 2.0 - 3.0) and/or PTT = N/A on admission, will monitor for bleeding  # Platelet Defect: home medication list includes an antiplatelet medication    # CKD, Stage 3b (GFR 30-44): Will monitor and treat as appropriate  - last Cr =  2.0 mg/dL (Ref range: 0.5 - 1.0 mg/dL)  - last GFR = 27 mL/min/1.73m2 (Ref range: >60 mL/min/1.73m2)     Past Medical History   Past Medical History:   Diagnosis Date     Chronic pain      Coronary atherosclerosis 6/14/2016     Essential hypertension      Ex-cigarette smoker     quit 7/2018 over 35 years     Ex-cigarette smoker      Hyperlipidemia      Hypothyroid      Seizures (H)      Stroke (H)      Vascular dementia (H)      Past Surgical History   Past Surgical History:   Procedure Laterality Date     athroplasty hip Bilateral     2003, 2006     OTHER SURGICAL HISTORY      athroplasty hip     STENT       Medications   Home Meds  Prior to Admission  medications    Medication Sig Start Date End Date Taking? Authorizing Provider   aspirin (ASA) 81 MG chewable tablet Take 1 tablet (81 mg) by mouth daily 4/8/22 5/8/22  Julio C Hay MD   atorvastatin (LIPITOR) 40 MG tablet Take 1 tablet (40 mg) by mouth daily 7/27/21   Bony Castaneda MD   blood glucose (NO BRAND SPECIFIED) test strip Use to test blood sugar 4-5 times daily or as directed. 1/14/22   Bony Castaneda MD   carvedilol (COREG) 6.25 MG tablet 1 tablet (6.25 mg) by Oral or Feeding Tube route 2 times daily (with meals) 7/27/21   Bony Castaneda MD   clotrimazole (LOTRIMIN) 1 % external cream Apply topically 2 times daily Until rash resolved 11/2/20   Bony Castaneda MD   cyanocobalamin (VITAMIN B-12) 1000 MCG tablet Take 1 tablet (1,000 mcg) by mouth daily 9/2/20   Bony Castaneda MD   diclofenac (VOLTAREN) 1 % topical gel Apply 2 g topically 4 times daily as needed for moderate pain 2/25/21   Bony Castaneda MD   doxazosin (CARDURA) 1 MG tablet Take 1 tablet (1 mg) by mouth At Bedtime 7/26/21   Bony Castaneda MD   DULoxetine (CYMBALTA) 20 MG capsule Take 1 capsule (20 mg) by mouth daily 9/7/21   Bony Castaneda MD   insulin glargine (LANTUS PEN) 100 UNIT/ML pen Please inject 18 units at bedtime change of dose 8/13/21   Bony Castaneda MD   insulin lispro (HUMALOG) 100 UNIT/ML Cartridge Take prior to meal with sliding scale per glucose level adjust as noted in notes usual dose around 20 units daily,1 unit to max 13 units Prior to meals taking 2 meals daily,1 unit with food premeal plus additional 140-169 (3)170 -199 (4) 200-229(5) 230-259(6) 260-289( 7) 290-319(8) 320-349(9) 350-379 (10) 380-409(11) greater 410(12)y 8/13/21   Bony Castaneda MD   insulin pen needle (31G X 8 MM) 31G X 8 MM miscellaneous Use 4 pen needles daily or as directed. 9/2/20   Bony Castaneda MD   levETIRAcetam (KEPPRA) 500 MG tablet Take 1 tablet (500 mg) by mouth 2 times  daily 8/30/21   Bony Castaneda MD   loratadine (CLARITIN) 10 mg tablet [LORATADINE (CLARITIN) 10 MG TABLET] Take 10 mg by mouth daily. 6/28/21   Provider, Historical   melatonin 10 mg Tab [MELATONIN 10 MG TAB] Take 10 mg by mouth daily. 6/28/21   Provider, Historical   NIFEdipine ER (ADALAT CC) 60 MG 24 hr tablet Take 1 tablet (60 mg) by mouth daily 2/10/22   Bony Castaneda MD   pregabalin (LYRICA) 75 MG capsule TAKE 1 CAPSULE(75 MG) BY MOUTH TWICE DAILY 4/5/22   Bony Castaneda MD   SYNTHROID 88 MCG tablet Take 1 tablet (88 mcg) by mouth daily 7/26/21   Bony Castaneda MD   terbinafine (LAMISIL) 1 % external cream Apply topically to gume of rash twice daily if needed 2/25/21   Bony Castaneda MD   Vitamin D3 (CHOLECALCIFEROL) 25 mcg (1000 units) tablet Take 1 tablet (25 mcg) by mouth daily 9/2/20   Bony Castaneda MD       Scheduled Meds    sodium chloride 0.9%  1,000 mL Intravenous Once       Infusion Meds    insulin (regular)         PRN Meds  dextrose    Allergies   Allergies   Allergen Reactions     Bee Pollen Headache     Pollen Extract Headache     Family History   Family History   Problem Relation Age of Onset     Acute Myocardial Infarction Father      Heart Disease Maternal Grandmother      Dementia Maternal Grandmother      Myocardial Infarction Father      Dementia Paternal Grandmother      Heart Disease Paternal Grandmother      Social History   Social History     Tobacco Use     Smoking status: Former Smoker     Packs/day: 0.50     Years: 35.00     Pack years: 17.50     Types: Cigarettes     Quit date: 7/1/2018     Years since quitting: 3.8     Smokeless tobacco: Never Used   Substance Use Topics     Alcohol use: Not Currently     Drug use: Not Currently       Review of Systems   Review of systems not obtained due to patient factors - critical condition and mental status       PHYSICAL EXAMINATION  Pulse:  [107] 107  Resp:  [43] 43  BP: (143)/(71) 143/71  SpO2:  [99 %]  99 %     Exam  Constitutional: Confused. No acute distress.   Head: Atraumatic, normocephalic.  ENT: Dry mucous membranes. No sinus drainage.  Cardiovascular: tachycardic  Respiratory: No increased work of breathing, no accessory muscle use.   Gastrointestinal: Does not appear distended.  Skin: No rashes or lesions appreciated on visualized skin.   Hematologic/Lymphatic/Immunologic: No bruising appreciated on visualized skin.     Neurologic:   Mental status: somnolent, looks to voice but not verbally responsive. Follows no commands. Speech fluency, comprehension and repetition are all impaired. Unable to discern if dysarthria due to lack of speech production.  Cranial nerves: Eyes conjugate, PERRL, looks to voice with some tracking, no fixed gaze (eyes cross midline), visual fields full to threat, face with mild left facial droop  Motor:  Moving all extremities in response to noxious stimuli, and with some antigravity, otherwise movement is limited. Spasticity noted in all four ext RLE>LLE. No abnormal movements noted (no myoclonus).   Reflexes: no ankle clonus. Toes up-going.  Sensory: Intact to pain x 4 extremities  Coordination/gait: unable to perform - mental status    Dysphagia Screen  Not performed due to mental status and critical condition    Stroke Scales    NIHSS  1a. Level of Consciousness 2-->Not alert, requires repeated stimulation to attend, or is obtunded and requires strong or painful stimulation to make movements (not stereotyped)   1b. LOC Questions 2-->Answers neither question correctly   1c. LOC Commands 2-->Performs neither task correctly   2.   Best Gaze 0-->Normal   3.   Visual 0-->No visual loss   4.   Facial Palsy 1-->Minor paralysis (flattened nasolabial fold, asymmetry on smiling)   5a. Motor Arm, Left 2-->Some effort against gravity, limb cannot get to or maintain (if cued) 90 (or 45) degrees, drifts down to bed, but has some effort against gravity   5b. Motor Arm, Right 2-->Some effort  against gravity, limb cannot get to or maintain (if cued) 90 (or 45) degrees, drifts down to bed, but has some effort against gravity   6a. Motor Leg, Left 2-->Some effort against gravity, leg falls to bed by 5 secs, but has some effort against gravity   6b. Motor Leg, right 2-->Some effort against gravity, leg falls to bed by 5 secs, but has some effort against gravity   7.   Limb Ataxia 0-->Absent   8.   Sensory 0-->Normal, no sensory loss   9.   Best Language 3-->Mute, global aphasia, no usable speech or auditory comprehension   10. Dysarthria 2-->Severe dysarthria, patients speech is so slurred as to be unintelligible in the absence of or out of proportion to any dysphasia, or is mute/anarthric (anarthric)   11. Extinction and Inattention  0-->No abnormality   Total 20 (05/03/22 0245)       Modified [place] Score (Pre-morbid)  3-Moderate disability; requiring some help, but able to walk without assistance    Imaging  I personally reviewed all imaging; relevant findings per HPI.     Lab Results Data   CBC  Recent Labs   Lab 05/03/22 0235   HGB 13.9   HCT 41     Basic Metabolic Panel    Recent Labs   Lab 05/03/22 0235 05/03/22 0228 05/03/22 0224     --   --    POTASSIUM 5.6*  --   --    CR  --  2.0*  --    *  --  579*     Liver Panel  No results for input(s): PROTTOTAL, ALBUMIN, BILITOTAL, ALKPHOS, AST, ALT, BILIDIRECT in the last 168 hours.  INR    Recent Labs   Lab Test 05/03/22  0229 04/10/22  1441 04/10/22  1439   INR 1.3* 0.93 1.1*      Lipid Profile    Recent Labs   Lab Test 04/06/22  0014 08/02/21  1009   CHOL 99 146   HDL 34* 39*   LDL 39 75   TRIG 129 158*     A1C    Recent Labs   Lab Test 04/05/22 2115 08/02/21  1009 06/21/21 2011   A1C 8.8* 8.2* 7.8*     Troponin I  No results for input(s): TROPONINIS, TROPONINI, GHTROP in the last 168 hours.       Stroke Code Data Data   Stroke Code Data  (for stroke code without tele)  Stroke code activated 05/03/22   0219   First stroke provider  response 05/03/22   0225   Last known normal 05/02/22   2100   Time of discovery   (or onset of symptoms) 05/02/22   2330   Head CT read by Stroke Neuro Dr/Provider 05/03/22   0245   Was stroke code de-escalated? Yes 05/03/22 0305            This note was dictated with voice to text and may contain transcription errors.

## 2022-05-03 NOTE — PHARMACY-VANCOMYCIN DOSING SERVICE
"Pharmacy Vancomycin Initial Note  Date of Service May 3, 2022  Patient's  1960  61 year old, female    Indication: Sepsis    Current estimated CrCl = Estimated Creatinine Clearance: 25.8 mL/min (A) (based on SCr of 2 mg/dL (H)).    Creatinine for last 3 days  5/3/2022:  2:27 AM Creatinine 1.84 mg/dL;  2:28 AM Creatinine POCT 2.0 mg/dL    Recent Vancomycin Level(s) for last 3 days  No results found for requested labs within last 72 hours.      Vancomycin IV Administrations (past 72 hours)      No vancomycin orders with administrations in past 72 hours.                Nephrotoxins and other renal medications (From now, onward)    Start     Dose/Rate Route Frequency Ordered Stop    22 0330  piperacillin-tazobactam (ZOSYN) 3.375 g vial to attach to  mL bag        Note to Pharmacy: For [facility], [facility] and [facility]: For Zosyn-naive patients, use the \"Zosyn initial dose + extended infusion\" order panel.    3.375 g  over 30 Minutes Intravenous ONCE 22 0329            Contrast Orders - past 72 hours (72h ago, onward)    Start     Dose/Rate Route Frequency Stop    22 0255  iopamidol (ISOVUE-370) solution 75 mL         75 mL Intravenous ONCE 22 0252          Plan:  1. Start vancomycin  1250 mg IV once, then follow levels.   2. Vancomycin monitoring method: Trough (Method 2 = manual dose calculation)  3. Vancomycin therapeutic monitoring goal: 15-20 mg/L  4. Pharmacy will check vancomycin levels as appropriate in 1-3 Days.    5. Serum creatinine levels will be ordered daily for the first week of therapy and at least twice weekly for subsequent weeks.      Matt Pedraza [facility]    "

## 2022-05-03 NOTE — H&P
[facility]    Internal Medicine History and Physical - [facility] Service       Date of Admission:  5/3/2022    Chief Complaint   Hyperglycemia, AMS    History is obtained from chart review. Daughter did not answer the phone tonight.    History of Present Illness   Isabell Matthews is a 61 year old female, with h/o acute ischemic stroke of right frontopperital punctate stroke due to small-vessel occlusion (was hospitalized in 4/5/22), seizure disorder, DMII, CKD3, HTN, HLD, CAD s/p PCI, hypothyroidism, vascular dementia, presenting with hyperglycemia 2/2 DKA.    Of note in 4/2022, pt presented with 1 day of difficulty waking and 45 mins of L eye vision loss.  IV thrombolysis was not given  due to unclear or unfavorable risk-benefit profile for extended window thrombolysis beyond the conventional 4.5 hour time window. Etiology was thought to be small vessel disease.  Was recommended asa 81 mg daily with hgba1c goal <7.     Upon admission to the ED on 5/3, there was concern for aphagia and stroke code was called, CT head showed no acute intracranial hemorrhages, showed small chronic infarcts. CTA head showed no intracranial arterial large vessel occlusion, and CT neck showed left carotid bifurcation moderate stenosis, atherosclerotic plaque results in less than 50% stenosis in the right carotid bulb and proximal cervical ICA, chronic right vertebral artery and proximal V1 with severe stenosis, but overall no significant interval change compared to CTA head and neck 04/10/2022. Not given TPA due to history of intracranial hemorrhage, per neuro.     Per chart review and talking with bedside RN, pt does talk and walk at baseline. Per neuro note, it states that daughter/care taker has been dealing with respirator illness recently.Per note, daughter stated that pt skipped insulin evening 5/1, daytime 5/2 due to decreased appetite and concern for precipitating hypoglycemia. I was unable  to talk with daughter on the phone tonight.     Her glucose was 686, ketones 5.4, ph 7.13/co2 32/bicarb 11, AG 23, K 5.5, hgba1c 8.9, Cr 1.84, LA 3.2, UA showing bacteria and WB 15 with small LE. Zosyn and Vanc started.   '  In the ED, HTN at 164/77, tachycardic at 109-112.    Review of Systems   Unable to do 10 point ROS given aphagia/altered mental status of patient.    Past Medical History    I have reviewed this patient's medical history and updated it with pertinent information if needed.   Past Medical History:   Diagnosis Date     Chronic pain      Coronary atherosclerosis 6/14/2016     Essential hypertension      Ex-cigarette smoker     quit 7/2018 over 35 years     Ex-cigarette smoker      Hyperlipidemia      Hypothyroid      Seizures (H)      Stroke (H)      Vascular dementia (H)     DMII    Past Surgical History   I have reviewed this patient's surgical history and updated it with pertinent information if needed.  Past Surgical History:   Procedure Laterality Date     athroplasty hip Bilateral     2003, 2006     OTHER SURGICAL HISTORY      athroplasty hip     STENT          Social History   Social History     Tobacco Use     Smoking status: Former Smoker     Packs/day: 0.50     Years: 35.00     Pack years: 17.50     Types: Cigarettes     Quit date: 7/1/2018     Years since quitting: 3.8     Smokeless tobacco: Never Used   Substance Use Topics     Alcohol use: Not Currently     Drug use: Not Currently       Family History   I have reviewed this patient's family history and updated it with pertinent information if needed.   Family History   Problem Relation Age of Onset     Acute Myocardial Infarction Father      Heart Disease Maternal Grandmother      Dementia Maternal Grandmother      Myocardial Infarction Father      Dementia Paternal Grandmother      Heart Disease Paternal Grandmother        Prior to Admission Medications   Prior to Admission Medications   Prescriptions Last Dose Informant Patient  Reported? Taking?   DULoxetine (CYMBALTA) 20 MG capsule   No No   Sig: Take 1 capsule (20 mg) by mouth daily   NIFEdipine ER (ADALAT CC) 60 MG 24 hr tablet   No No   Sig: Take 1 tablet (60 mg) by mouth daily   SYNTHROID 88 MCG tablet   No No   Sig: Take 1 tablet (88 mcg) by mouth daily   Vitamin D3 (CHOLECALCIFEROL) 25 mcg (1000 units) tablet   No No   Sig: Take 1 tablet (25 mcg) by mouth daily   aspirin (ASA) 81 MG chewable tablet   No No   Sig: Take 1 tablet (81 mg) by mouth daily   atorvastatin (LIPITOR) 40 MG tablet   No No   Sig: Take 1 tablet (40 mg) by mouth daily   blood glucose (NO BRAND SPECIFIED) test strip   No No   Sig: Use to test blood sugar 4-5 times daily or as directed.   carvedilol (COREG) 6.25 MG tablet   No No   Si tablet (6.25 mg) by Oral or Feeding Tube route 2 times daily (with meals)   clotrimazole (LOTRIMIN) 1 % external cream   No No   Sig: Apply topically 2 times daily Until rash resolved   cyanocobalamin (VITAMIN B-12) 1000 MCG tablet   No No   Sig: Take 1 tablet (1,000 mcg) by mouth daily   diclofenac (VOLTAREN) 1 % topical gel   No No   Sig: Apply 2 g topically 4 times daily as needed for moderate pain   doxazosin (CARDURA) 1 MG tablet   No No   Sig: Take 1 tablet (1 mg) by mouth At Bedtime   insulin glargine (LANTUS PEN) 100 UNIT/ML pen   No No   Sig: Please inject 18 units at bedtime change of dose   insulin lispro (HUMALOG) 100 UNIT/ML Cartridge   No No   Sig: Take prior to meal with sliding scale per glucose level adjust as noted in notes usual dose around 20 units daily,1 unit to max 13 units Prior to meals taking 2 meals daily,1 unit with food premeal plus additional 140-169 (3)170 -199 (4) 200-229(5) 230-259(6) 260-289( 7) 290-319(8) 320-349(9) 350-379 (10) 380-409(11) greater 410(12)y   insulin pen needle (31G X 8 MM) 31G X 8 MM miscellaneous   No No   Sig: Use 4 pen needles daily or as directed.   levETIRAcetam (KEPPRA) 500 MG tablet   No No   Sig: Take 1 tablet (500 mg)  by mouth 2 times daily   loratadine (CLARITIN) 10 mg tablet   Yes No   Sig: [LORATADINE (CLARITIN) 10 MG TABLET] Take 10 mg by mouth daily.   melatonin 10 mg Tab   Yes No   Sig: [MELATONIN 10 MG TAB] Take 10 mg by mouth daily.   pregabalin (LYRICA) 75 MG capsule   No No   Sig: TAKE 1 CAPSULE(75 MG) BY MOUTH TWICE DAILY   terbinafine (LAMISIL) 1 % external cream   No No   Sig: Apply topically to gume of rash twice daily if needed      Facility-Administered Medications: None     Allergies   Allergies   Allergen Reactions     Bee Pollen Headache     Pollen Extract Headache       Physical Exam   Vital Signs:     BP: 128/62 Pulse: 105   Resp: 9 SpO2: 100 % O2 Device: None (Room air)    Weight: 0 lbs 0 oz    General Appearance: in NAD. Lying in bed.  Eyes: EOMI.   HEENT: NC. AT. Has facial hair on lower chin.  Respiratory: CTAB anteriorly. On RA. No increased WOB.  Cardiovascular: tachycardic. Regular rhythm. No murmurs.  GI: NT, ND. soft  Skin: no rashes  Ext: no edema. Has contraction of BUE and BLE.  Neurologic: Alert. Not oriented. Not following any commands. Tracking with eyes. Pulls away to noxious stimuli.    Assessment & Plan   Isabell Matthews is a 61 year old female, with h/o acute ischemic stroke of right frontopperital punctate stroke due to small-vessel occlusion (was hospitalized in 4/5/22), multiple lacunar and punctate ischemic infarcts and multifocal moderate to severe stenosis in multiple vascular territories, seizure disorder, DMII, CKD3, HTN, HLD, CAD s/p PCI, hypothyroidism, vascular dementia, presenting with AMS/aphasia, found to have hyperglycemia 2/2 DKA and elevated LA.    # Hyperglycemia 2/2 DKA  # UTI  # H/o DMII (No DEMETRIA ab/C-peptide)  Her glucose was 686, ketones 5.4, hgba1c 8.9. Had DKA in 10/2020. Per Endo, suspect insulin dependent DMII.    Etiology: may be due to UTI: UA showing bacteria and WB 15 with small LE. Per neuro note, it states that daughter/care taker has been dealing with respirator  illness recently. It may also be due to skipped insulin evening 5/1, daytime 5/2 due to decreased appetite and concern for precipitating hypoglycemia per caretaker/daughter.  - s/p Zosyn and Vanc in ED, continued on medicine floor, may continue per primary team, ucx pending  - holding pta glargine 18 U at bedtime, pta lispro mealtime SSI for now  - pta pregabalin 75 mg bid  - DKA proctocol ordered  - Endocrine consulted  - BMP, phos q2h ordered  - phos, BMP, Mg, CBC, abg ordered  - Bcx x1 pending from ED  - EKG ordered  - pending covid, influenza, respiratory panel    # AGMA with uncompensated respiratory acidosis  # Hyperkalemia  # Elevated lactate  On admission, ph 7.1/co2 32/bicarb 11. Not compensated. LA 3.2. K 5.5    AGMA due to lactate, ketoacidosis 2/2 DKA. Will improve with fluid resuscitation and insulin. Hyperkalemia 2/2 DKA/AGMA.  - s/p 2 L in ED  - 1 L ordered on medicine floor  - DKA proctocol  - repeat Lactate 0600AM ordered    # Acute on Chronic kidney disease  Cr 1.84 on admission. Cr baseline 1.2-1.45. 2/2 volume depletion 2/2 DKA.  - BMP q2h  - s/p 2 L ivf in ED  - ordered 3rd L IVF    # Acute metabolic toxic encephalopathy  # Aphasia  # Concern for acute stroke  Of note in 4/2022, pt presented with 1 day of difficulty waking and 45 mins of L eye vision loss.  IV thrombolysis was not given  due to unclear or unfavorable risk-benefit profile for extended window thrombolysis beyond the conventional 4.5 hour time window. Etiology was thought to be small vessel disease.  Was recommended asa 81 mg daily with hgba1c goal <7.    Upon admission to the ED, there was concern for aphagia and stroke code was called, CT head showed no acute intracranial hemorrhages, showed small chronic infarcts. CTA head showed no intracranial arterial large vessel occlusion, and CT neck showed left carotid bifurcation moderate stenosis, atherosclerotic plaque results in less than 50% stenosis in the right carotid bulb and  proximal cervical ICA, chronic right vertebral artery and proximal V1 with severe stenosis, but overall no significant interval change compared to CTA head and neck 04/10/2022. Not given TPA due to history of intracranial hemorrhage.     Etiology ddx: During stroke code, CT and CTA head imaging did not demonstrate clear new occlusive lesion or developing area of hypodensity concerning for stroke as primary etiology. Most likely toxic metabolic from DKA vs. Infection (respiratory vs UTI).  - pta atorvastatin 40 mg daily  - pta asa 81 mg daily  - q4h neuro checks  - consider MRI brain per primary team  - pending influenza, covid, respiratory panel    --Chronic diseases--  **Will place essential meds to IV instead of PTA PO since pt has AMS and is not able to do PO, not following commands**    #Seizure disorder  - PTA Keppra 500 mg BID (IV)     #CKD3  - BMP, phos q2h    #HTN  On admission /77.  - PTA coreg 6.25 mg, and nifedipine 60 mg daily (instead of this placed PRN hydralzine 10 mg q6h)     #HLD  - PTA atrova 40 mg daily     #CAD s/p PCI  - PTA coreg and nifedipine 60 mg daily (instead of this placed PRN hydralzine 10 mg q6h, fpr SBP >170)     #Hypothyroidism  - PTA synthroid 88 mcg daily     #Vascular dementia  - Delirium precautions    # MDD  - pta duloxetine 20 mg daily    # Urinary retention  - pta doxazosin    Diet: NPO for Medical/Clinical Reasons Except for: No Exceptions  Fluids: s/p 2 L, pending 1 L on medicine pro  Parker Catheter: Not present    DVT Prophylaxis: hep sq  Code Status: Prior  Expected Discharge:   Anticipated discharge location:  Awaiting care coordination huddle  Delays:          The patient will be staffed in the AM.     Rita Tirado MD  PGY3  IM resident  [phone]      Data   Recent Labs   Lab 05/03/22  0432 05/03/22  0401 05/03/22  0235 05/03/22  0229 05/03/22 0228 05/03/22 0227   WBC  --   --   --   --   --  8.3   HGB  --   --  13.9  --   --  12.7   MCV  --   --   --   --    --  95   PLT  --   --   --   --   --  239   INR  --   --   --  1.3*  --   --    NA  --   --  137  --   --  136   POTASSIUM  --   --  5.6*  --   --  5.5*   CHLORIDE  --   --   --   --   --  102   CO2  --   --   --   --   --  11*   BUN  --   --   --   --   --  49*   CR  --   --   --   --  2.0* 1.84*   ANIONGAP  --   --   --   --   --  23*   VERONICA  --   --   --   --   --  9.5   * 482* 624*  --   --  686*     Recent Results (from the past 24 hour(s))   CT Head w/o Contrast    Narrative    EXAM: CT HEAD W/O CONTRAST  LOCATION: [facility]  DATE/TIME: 5/3/2022 2:50 AM    INDICATION: Altered level of consciousness, confusion, possible stroke  COMPARISON: CT head 04/10/2022. MRI brain 04/05/2022.  TECHNIQUE: Routine CT Head without IV contrast. Multiplanar reformats. Dose reduction techniques were used.    FINDINGS:  INTRACRANIAL CONTENTS: No intracranial hemorrhage, extraaxial collection, or mass effect.  No CT evidence of acute infarct. Mild to moderate presumed chronic small vessel ischemic changes. Mild to moderate generalized volume loss. No hydrocephalus. Right   frontal lobe predominantly white matter chronic infarct. Right basal ganglia small chronic infarct. Bilateral thalami demonstrate multiple small chronic infarct redemonstrated from CT head 04/10/2022. Left basal ganglia chronic infarct better visualized   on MRI brain 04/05/2022.    VISUALIZED ORBITS/SINUSES/MASTOIDS: No intraorbital abnormality. No paranasal sinus mucosal disease. No middle ear or mastoid effusion.    BONES/SOFT TISSUES: No acute abnormality. Vascular calcifications.      Impression    IMPRESSION:  1.  No acute intracranial hemorrhage.  2.  No CT evidence acute infarct. Aspect score 10.  3.  Age-related changes described above.  4.  Small chronic infarcts described above.    TAM Lim was notified by Dr Reynaldo Hernandez at  3:10 AM 05/03/2022.    CTA Head Neck with Contrast     Narrative    EXAM: CTA  HEAD NECK WITH CONTRAST  LOCATION: [facility]  DATE/TIME: 5/3/2022 2:50 AM    INDICATION: Altered mental status, confusion, concern for stroke  COMPARISON: CTA head and neck 04/10/2022  CONTRAST: iopamidol (ISOVUE 370) solution 75 mL  TECHNIQUE: Head and neck CT angiogram with IV contrast. Axial helical CT images of the head and neck vessels obtained during the arterial phase of intravenous contrast administration. Axial 2D reconstructed images and multiplanar 3D MIP reconstructed   images of the head and neck vessels were performed by the technologist. Dose reduction techniques were used. All stenosis measurements made according to NASCET criteria unless otherwise specified.    FINDINGS:   HEAD CTA:  Right precavernous ICA severe stenosis. Right cavernous ICA moderate stenosis. Right carotid siphon mild to moderate stenosis. Right supraclinoid ICA severe stenosis. Findings are redemonstrated from prior examination.    Left precavernous ICA moderate stenosis. Left cavernous ICA mild stenosis. Left carotid siphon mild to moderate stenosis. Left supraclinoid ICA moderate stenosis. Findings redemonstrated from prior examination.    Right proximal M2 segments demonstrate multiple severe stenoses and occlusions similar to prior examination.    Trifurcated A2/A3 segments. Posterior A2/A3 segment demonstrate multiple stenoses ranging from mild to severe similar to prior examination.    Left distal M1 segment moderate stenosis redemonstrated.    Left P2 segment demonstrates multiple severe stenoses redemonstrated from prior examination.     No additional significant stenosis/occlusion.      No brain aneurysm. No AVM/AVF.    Codominant left posterior communicating artery and left P1 segment.     Dominant right and smaller left vertebral artery contribute to a normal basilar artery.     DURAL VENOUS SINUSES: Not well evaluated on a technical  basis.      NECK CTA:  RIGHT CAROTID: Atherosclerotic plaque results in less than 50% stenosis in the right carotid bulb and proximal cervical ICA. No dissection.    LEFT CAROTID: Left carotid bifurcation moderate stenosis. Otherwise, no significant stenosis/occlusion. No dissection.    VERTEBRAL ARTERIES:  Right vertebral artery origin and proximal V1 segment severe stenosis redemonstrated.    Remaining bilateral extradural vertebral arteries demonstrate no significant stenosis/occlusion. No dissection.      Balanced vertebral arteries.    AORTIC ARCH: Classic aortic arch anatomy. Left subclavian artery origin mild stenosis redemonstrated.    ARTERIAL PLAQUE: Calcified/noncalcified plaque aorta, left subclavian artery, right brachycephalic artery, right subclavian artery, bilateral carotid bifurcations/bulbs, bilateral precavernous ICAs, bilateral carotid siphons, right proximal vertebral   artery.    NONVASCULAR STRUCTURES:   Degenerative changes noted within the spine. Dental disease with multiple cavities and periapical lucencies.          Impression     IMPRESSION:   HEAD CTA:   No intracranial arterial large vessel occlusion.    Multiple severe stenoses and occlusions described above not significantly changed compared to CTA head and neck 04/10/2022.    NECK CTA:  No significant interval change compared to CTA head and neck 04/10/2022.    Atherosclerotic plaque results in less than 50% stenosis in the right carotid bulb and proximal cervical ICA.      Left carotid bifurcation moderate stenosis.     Right vertebral artery origin and proximal V1 segment severe stenosis redemonstrated.         TAM Lim was notified by Dr Reynaldo Hernandez at  3:30 AM 05/03/2022.     XR Chest Port 1 View    Narrative    EXAM: XR CHEST PORT 1 VIEW  LOCATION: [facility]  DATE/TIME: 5/3/2022 3:37 AM    INDICATION: URI symptoms  COMPARISON: 4/10/2022.    FINDINGS: The heart size is  normal. The thoracic aorta is calcified. The lungs are clear. No pneumothorax.      Impression    IMPRESSION: No acute abnormality.

## 2022-05-03 NOTE — PROGRESS NOTES
Diabetes Brief Note    Was paged by cross cover team Dr Long that patient is on DKA protocol but there nurse informed him that they didn't find any dextrose fluids orders in the order set    I reviewed the DKA protocol orders and also reviewed the order set that was used- diab DKA management adult> 45 kgs. Her BG was 96 in the evening and nurse paged for dextrose IVF order. Spoke with nurse for the patient and a nurse next to her, primary team checked with charge nurse  they couldn't find any active PRN orders from Dextrose IVF in the protocol or on the algorithm that they usually get a printout of.    Cross cover team ordered D5 1/2 NS @150 ml/hr for now. On reviewing chart patient AG has been closed since AM, bicarb up trended in 3 consecutive BMP. DKA resolved. Patient still NPO.     Will clarify the orders in the order set for DKA protocol with staff in AM if the dextrose IVF orders fell off the protocol     Discussed with staff endocrinologist Dr Camarillo , we changed the orders to diab continuous iv insulin adult for now  Spoke with primary team and nurse with recs - made the changes to orders

## 2022-05-03 NOTE — LETTER
Transition Communication Hand-off for Care Transitions to Next Level of Care Provider    Name: Isabell Matthews  : 1960  MRN #: [id number]  Primary Care Provider: Bony Castaneda     Primary Clinic: [address] [place] [id number]     Reason for Hospitalization:  Diabetic ketoacidosis without coma associated with type 1 diabetes mellitus (H) [E10.10]  Altered mental status, unspecified altered mental status type [R41.82]  Admit Date/Time: 5/3/2022  2:18 AM  Discharge Date: 2022    Payor Source: Payor: MEDICARE / Plan: MEDICARE / Product Type: Medicare /          Reason for Communication Hand-off Referral: Other  continuity of care    Discharge Plan: per attached AVS  Concern for non-adherence with plan of care:   Y/N no  Discharge Needs Assessment:  Needs    Flowsheet Row Most Recent Value   Equipment Currently Used at Home wheelchair, manual, walker, standard           Follow-up plan:    Future Appointments   Date Time Provider Department Center   2022  8:45 AM Enid Ingram, SLP [facility]   2022  2:30 PM Colton Johnston, PT [facility]   2022  1:30 PM [facility] Esteves [facility]   2022  1:00 PM [facility]    8/15/2022  3:00 PM Elisa Soto MD [facility]        Any outstanding tests or procedures:        Referrals     Future Labs/Procedures    Adult Neuropsychology Referral     Process Instructions:    If the patient is under the age of 17, please order a Pediatric Mental Health Referral and select Neuropsychology.    Comments:    Please be aware that coverage of these services is subject to the terms and limitations of your health insurance plan.  Call member services at your health plan with any benefit or coverage questions.  Please call to schedule your appointment              Key Recommendations:  See attached AVS    Patricio Moore RN    AVS/Discharge Summary is the source of truth; this is a helpful  guide for improved communication of patient story

## 2022-05-03 NOTE — ED NOTES
"Patient arrives via ems due to decreased mental status. Per family patient has been having high blood sugars at home and becoming more lethargic. She had not received her insulin in days due to no oral intake per daughter and they felt she couldn't be managed at home any more. She gave her 10 units of lantus and 5 units of \"fast acting\" before ems arrived. Patient arrives here with blood sugar greater than 500. She is lethargic, pale and not verbally responsive. Her eyes open spontaneously but she does not follow commands. Hx of old stroke with left facial droop and left side weakness. Smells strongly of urine.   "

## 2022-05-03 NOTE — ED NOTES
Pt was unable to swallow 0800 morning meds. Currently nonverbal and unable to follow commands. Service was notified.

## 2022-05-03 NOTE — CONSULTS
Diabetes Service Brief Note-  Patient currently remains NPO, not swallowing, not verbally responsive.    On IV insulin per DKA protocol, with gap now closed.  We will plan to see Isabell and develop subcutaneous insulin plan tomorrow or when otherwise medically appropriate for transition.  Discussed w/ primary team.   Endocrine Diabetes service, job code 3111

## 2022-05-03 NOTE — PHARMACY
Pharmacy Stroke Code Response    Pharmacist responded as part of the Stroke Code Team activation to patient care area ED3. The Stroke Team determined that the patient was not a candidate for IV thrombolytic therapy and the pharmacy team was dismissed at 0300.    Joshua Yang [facility]

## 2022-05-03 NOTE — PROGRESS NOTES
Stroke Code Nurse-Responder Note    Arrival Time to Stroke Code: 0225    Stroke Code Team interventions:   - De-escalated at 0340 by Dr. Kolb    ED/Bedside Nurse providing handoff: Aster    Time left for CT: 0238    Time arrived to next location (ED/Unit/IR): 0250    ED/Bedside Nurse given handoff (name/time): n/a, bedside nurse present during transport to CT    Janine Johnston RN

## 2022-05-03 NOTE — ED NOTES
Patient bood glucose at 0434 dropped to 428 from 482 at 0400- per MD continue on algorithm one without rate change.

## 2022-05-03 NOTE — LETTER
Transition Communication Hand-off for Care Transitions to Next Level of Care Provider    Name: Isabell Matthews  : 1960  MRN #: [id number]  Primary Care Provider: Bony Castaneda     Primary Clinic: [address] [place] [id number]     Reason for Hospitalization:  Diabetic ketoacidosis without coma associated with type 1 diabetes mellitus (H) [E10.10]  Altered mental status, unspecified altered mental status type [R41.82]  Admit Date/Time: 5/3/2022  2:18 AM  Discharge Date: 2022    Payor Source: Payor: MEDICARE / Plan: MEDICARE / Product Type: Medicare /      PT/OT/RN resumption, ST Eval and Treat    YURIDIA Lake, BA, RN, RNCC  Pager: [phone]  Phone: [phone]  Weekend/Holiday Pager: [phone]

## 2022-05-03 NOTE — ED NOTES
Second set of cultures not drawn due to multiple failed attempts. MD daniels'd starting IV abx prior to collection of second set of cultures.

## 2022-05-04 ENCOUNTER — APPOINTMENT (OUTPATIENT)
Dept: NEUROLOGY | Facility: CLINIC | Age: 62
DRG: 637 | End: 2022-05-04
Attending: INTERNAL MEDICINE
Payer: MEDICARE

## 2022-05-04 LAB
ALBUMIN SERPL-MCNC: 2.4 G/DL (ref 3.4–5)
ALP SERPL-CCNC: 132 U/L (ref 40–150)
ALT SERPL W P-5'-P-CCNC: 15 U/L (ref 0–50)
ANION GAP SERPL CALCULATED.3IONS-SCNC: 6 MMOL/L (ref 3–14)
ANION GAP SERPL CALCULATED.3IONS-SCNC: 7 MMOL/L (ref 3–14)
ANION GAP SERPL CALCULATED.3IONS-SCNC: 8 MMOL/L (ref 3–14)
AST SERPL W P-5'-P-CCNC: 41 U/L (ref 0–45)
BACTERIA UR CULT: ABNORMAL
BACTERIA UR CULT: ABNORMAL
BASE EXCESS BLDV CALC-SCNC: -1.7 MMOL/L (ref -7.7–1.9)
BASE EXCESS BLDV CALC-SCNC: -1.9 MMOL/L (ref -7.7–1.9)
BILIRUB SERPL-MCNC: 0.3 MG/DL (ref 0.2–1.3)
BUN SERPL-MCNC: 13 MG/DL (ref 7–30)
BUN SERPL-MCNC: 17 MG/DL (ref 7–30)
BUN SERPL-MCNC: 19 MG/DL (ref 7–30)
CALCIUM SERPL-MCNC: 8.2 MG/DL (ref 8.5–10.1)
CALCIUM SERPL-MCNC: 8.6 MG/DL (ref 8.5–10.1)
CALCIUM SERPL-MCNC: 8.6 MG/DL (ref 8.5–10.1)
CHLORIDE BLD-SCNC: 112 MMOL/L (ref 94–109)
CHLORIDE BLD-SCNC: 113 MMOL/L (ref 94–109)
CHLORIDE BLD-SCNC: 115 MMOL/L (ref 94–109)
CO2 SERPL-SCNC: 23 MMOL/L (ref 20–32)
CO2 SERPL-SCNC: 24 MMOL/L (ref 20–32)
CO2 SERPL-SCNC: 24 MMOL/L (ref 20–32)
CREAT SERPL-MCNC: 1.24 MG/DL (ref 0.52–1.04)
CREAT SERPL-MCNC: 1.29 MG/DL (ref 0.52–1.04)
CREAT SERPL-MCNC: 1.29 MG/DL (ref 0.52–1.04)
CREAT SERPL-MCNC: 1.31 MG/DL (ref 0.52–1.04)
ERYTHROCYTE [DISTWIDTH] IN BLOOD BY AUTOMATED COUNT: 13.9 % (ref 10–15)
GFR SERPL CREATININE-BSD FRML MDRD: 46 ML/MIN/1.73M2
GFR SERPL CREATININE-BSD FRML MDRD: 49 ML/MIN/1.73M2
GLUCOSE BLD-MCNC: 101 MG/DL (ref 70–99)
GLUCOSE BLD-MCNC: 134 MG/DL (ref 70–99)
GLUCOSE BLD-MCNC: 152 MG/DL (ref 70–99)
GLUCOSE BLDC GLUCOMTR-MCNC: 106 MG/DL (ref 70–99)
GLUCOSE BLDC GLUCOMTR-MCNC: 110 MG/DL (ref 70–99)
GLUCOSE BLDC GLUCOMTR-MCNC: 114 MG/DL (ref 70–99)
GLUCOSE BLDC GLUCOMTR-MCNC: 115 MG/DL (ref 70–99)
GLUCOSE BLDC GLUCOMTR-MCNC: 120 MG/DL (ref 70–99)
GLUCOSE BLDC GLUCOMTR-MCNC: 122 MG/DL (ref 70–99)
GLUCOSE BLDC GLUCOMTR-MCNC: 123 MG/DL (ref 70–99)
GLUCOSE BLDC GLUCOMTR-MCNC: 124 MG/DL (ref 70–99)
GLUCOSE BLDC GLUCOMTR-MCNC: 125 MG/DL (ref 70–99)
GLUCOSE BLDC GLUCOMTR-MCNC: 126 MG/DL (ref 70–99)
GLUCOSE BLDC GLUCOMTR-MCNC: 128 MG/DL (ref 70–99)
GLUCOSE BLDC GLUCOMTR-MCNC: 128 MG/DL (ref 70–99)
GLUCOSE BLDC GLUCOMTR-MCNC: 129 MG/DL (ref 70–99)
GLUCOSE BLDC GLUCOMTR-MCNC: 132 MG/DL (ref 70–99)
GLUCOSE BLDC GLUCOMTR-MCNC: 132 MG/DL (ref 70–99)
GLUCOSE BLDC GLUCOMTR-MCNC: 139 MG/DL (ref 70–99)
GLUCOSE BLDC GLUCOMTR-MCNC: 144 MG/DL (ref 70–99)
GLUCOSE BLDC GLUCOMTR-MCNC: 147 MG/DL (ref 70–99)
GLUCOSE BLDC GLUCOMTR-MCNC: 148 MG/DL (ref 70–99)
GLUCOSE BLDC GLUCOMTR-MCNC: 165 MG/DL (ref 70–99)
GLUCOSE BLDC GLUCOMTR-MCNC: 166 MG/DL (ref 70–99)
GLUCOSE BLDC GLUCOMTR-MCNC: 196 MG/DL (ref 70–99)
GLUCOSE BLDC GLUCOMTR-MCNC: 198 MG/DL (ref 70–99)
GLUCOSE BLDC GLUCOMTR-MCNC: 219 MG/DL (ref 70–99)
GLUCOSE BLDC GLUCOMTR-MCNC: 236 MG/DL (ref 70–99)
GLUCOSE BLDC GLUCOMTR-MCNC: 98 MG/DL (ref 70–99)
GLUCOSE BLDC GLUCOMTR-MCNC: 98 MG/DL (ref 70–99)
HCO3 BLDV-SCNC: 24 MMOL/L (ref 21–28)
HCO3 BLDV-SCNC: 24 MMOL/L (ref 21–28)
HCT VFR BLD AUTO: 35.3 % (ref 35–47)
HGB BLD-MCNC: 11.2 G/DL (ref 11.7–15.7)
HOLD SPECIMEN: NORMAL
MCH RBC QN AUTO: 28.6 PG (ref 26.5–33)
MCHC RBC AUTO-ENTMCNC: 31.7 G/DL (ref 31.5–36.5)
MCV RBC AUTO: 90 FL (ref 78–100)
O2/TOTAL GAS SETTING VFR VENT: 21 %
O2/TOTAL GAS SETTING VFR VENT: 21 %
PCO2 BLDV: 42 MM HG (ref 40–50)
PCO2 BLDV: 43 MM HG (ref 40–50)
PH BLDV: 7.35 [PH] (ref 7.32–7.43)
PH BLDV: 7.36 [PH] (ref 7.32–7.43)
PLATELET # BLD AUTO: 161 10E3/UL (ref 150–450)
PO2 BLDV: 26 MM HG (ref 25–47)
PO2 BLDV: 34 MM HG (ref 25–47)
POTASSIUM BLD-SCNC: 2.8 MMOL/L (ref 3.4–5.3)
POTASSIUM BLD-SCNC: 2.8 MMOL/L (ref 3.4–5.3)
POTASSIUM BLD-SCNC: 3.6 MMOL/L (ref 3.4–5.3)
PROT SERPL-MCNC: 5.4 G/DL (ref 6.8–8.8)
RBC # BLD AUTO: 3.92 10E6/UL (ref 3.8–5.2)
SODIUM SERPL-SCNC: 144 MMOL/L (ref 133–144)
VANCOMYCIN SERPL-MCNC: 10.5 MG/L
WBC # BLD AUTO: 8.1 10E3/UL (ref 4–11)

## 2022-05-04 PROCEDURE — 80048 BASIC METABOLIC PNL TOTAL CA: CPT | Performed by: INTERNAL MEDICINE

## 2022-05-04 PROCEDURE — 250N000009 HC RX 250: Performed by: STUDENT IN AN ORGANIZED HEALTH CARE EDUCATION/TRAINING PROGRAM

## 2022-05-04 PROCEDURE — 82803 BLOOD GASES ANY COMBINATION: CPT | Performed by: INTERNAL MEDICINE

## 2022-05-04 PROCEDURE — 80053 COMPREHEN METABOLIC PANEL: CPT | Performed by: INTERNAL MEDICINE

## 2022-05-04 PROCEDURE — 36415 COLL VENOUS BLD VENIPUNCTURE: CPT | Performed by: HOSPITALIST

## 2022-05-04 PROCEDURE — 120N000003 HC R&B IMCU UMMC

## 2022-05-04 PROCEDURE — 99222 1ST HOSP IP/OBS MODERATE 55: CPT | Performed by: CLINICAL NURSE SPECIALIST

## 2022-05-04 PROCEDURE — 36415 COLL VENOUS BLD VENIPUNCTURE: CPT | Performed by: INTERNAL MEDICINE

## 2022-05-04 PROCEDURE — 82565 ASSAY OF CREATININE: CPT | Performed by: INTERNAL MEDICINE

## 2022-05-04 PROCEDURE — 95711 VEEG 2-12 HR UNMONITORED: CPT

## 2022-05-04 PROCEDURE — 250N000011 HC RX IP 250 OP 636: Performed by: STUDENT IN AN ORGANIZED HEALTH CARE EDUCATION/TRAINING PROGRAM

## 2022-05-04 PROCEDURE — 99233 SBSQ HOSP IP/OBS HIGH 50: CPT | Performed by: INTERNAL MEDICINE

## 2022-05-04 PROCEDURE — 85018 HEMOGLOBIN: CPT | Performed by: INTERNAL MEDICINE

## 2022-05-04 PROCEDURE — 258N000001 HC RX 258: Performed by: STUDENT IN AN ORGANIZED HEALTH CARE EDUCATION/TRAINING PROGRAM

## 2022-05-04 PROCEDURE — 80202 ASSAY OF VANCOMYCIN: CPT | Performed by: HOSPITALIST

## 2022-05-04 PROCEDURE — 250N000011 HC RX IP 250 OP 636: Performed by: INTERNAL MEDICINE

## 2022-05-04 PROCEDURE — 95718 EEG PHYS/QHP 2-12 HR W/VEEG: CPT | Performed by: PSYCHIATRY & NEUROLOGY

## 2022-05-04 PROCEDURE — 258N000001 HC RX 258: Performed by: INTERNAL MEDICINE

## 2022-05-04 PROCEDURE — 250N000013 HC RX MED GY IP 250 OP 250 PS 637: Performed by: STUDENT IN AN ORGANIZED HEALTH CARE EDUCATION/TRAINING PROGRAM

## 2022-05-04 PROCEDURE — 99233 SBSQ HOSP IP/OBS HIGH 50: CPT | Mod: GC | Performed by: PSYCHIATRY & NEUROLOGY

## 2022-05-04 RX ORDER — POTASSIUM CHLORIDE 7.45 MG/ML
10 INJECTION INTRAVENOUS
Status: COMPLETED | OUTPATIENT
Start: 2022-05-04 | End: 2022-05-04

## 2022-05-04 RX ORDER — POTASSIUM CHLORIDE 7.45 MG/ML
10 INJECTION INTRAVENOUS
Status: DISPENSED | OUTPATIENT
Start: 2022-05-04 | End: 2022-05-04

## 2022-05-04 RX ORDER — VANCOMYCIN HYDROCHLORIDE 1 G/200ML
1000 INJECTION, SOLUTION INTRAVENOUS EVERY 24 HOURS
Status: DISCONTINUED | OUTPATIENT
Start: 2022-05-04 | End: 2022-05-05

## 2022-05-04 RX ORDER — POTASSIUM CHLORIDE 7.45 MG/ML
10 INJECTION INTRAVENOUS ONCE
Status: COMPLETED | OUTPATIENT
Start: 2022-05-04 | End: 2022-05-05

## 2022-05-04 RX ORDER — PIPERACILLIN SODIUM, TAZOBACTAM SODIUM 3; .375 G/15ML; G/15ML
3.38 INJECTION, POWDER, LYOPHILIZED, FOR SOLUTION INTRAVENOUS EVERY 6 HOURS
Status: DISCONTINUED | OUTPATIENT
Start: 2022-05-04 | End: 2022-05-05

## 2022-05-04 RX ORDER — DEXTROSE MONOHYDRATE 100 MG/ML
INJECTION, SOLUTION INTRAVENOUS CONTINUOUS
Status: DISCONTINUED | OUTPATIENT
Start: 2022-05-04 | End: 2022-05-06

## 2022-05-04 RX ADMIN — POTASSIUM CHLORIDE 10 MEQ: 7.46 INJECTION, SOLUTION INTRAVENOUS at 15:01

## 2022-05-04 RX ADMIN — PIPERACILLIN AND TAZOBACTAM 3.38 G: 3; .375 INJECTION, POWDER, LYOPHILIZED, FOR SOLUTION INTRAVENOUS at 22:17

## 2022-05-04 RX ADMIN — POTASSIUM CHLORIDE 10 MEQ: 7.46 INJECTION, SOLUTION INTRAVENOUS at 13:23

## 2022-05-04 RX ADMIN — POTASSIUM CHLORIDE 10 MEQ: 7.46 INJECTION, SOLUTION INTRAVENOUS at 18:32

## 2022-05-04 RX ADMIN — POTASSIUM CHLORIDE 10 MEQ: 7.46 INJECTION, SOLUTION INTRAVENOUS at 23:32

## 2022-05-04 RX ADMIN — HUMAN INSULIN 3 UNITS/HR: 100 INJECTION, SOLUTION SUBCUTANEOUS at 08:11

## 2022-05-04 RX ADMIN — PREGABALIN 75 MG: 75 CAPSULE ORAL at 20:13

## 2022-05-04 RX ADMIN — DEXTROSE MONOHYDRATE: 100 INJECTION, SOLUTION INTRAVENOUS at 17:02

## 2022-05-04 RX ADMIN — DOXAZOSIN 1 MG: 1 TABLET ORAL at 21:12

## 2022-05-04 RX ADMIN — HUMAN INSULIN 2 UNITS/HR: 100 INJECTION, SOLUTION SUBCUTANEOUS at 17:05

## 2022-05-04 RX ADMIN — VANCOMYCIN HYDROCHLORIDE 1000 MG: 1 INJECTION, SOLUTION INTRAVENOUS at 09:43

## 2022-05-04 RX ADMIN — DEXTROSE MONOHYDRATE 1000 ML: 100 INJECTION, SOLUTION INTRAVENOUS at 01:30

## 2022-05-04 RX ADMIN — DEXTROSE MONOHYDRATE 1000 ML: 100 INJECTION, SOLUTION INTRAVENOUS at 13:21

## 2022-05-04 RX ADMIN — PIPERACILLIN AND TAZOBACTAM 2.25 G: 2; .25 INJECTION, POWDER, FOR SOLUTION INTRAVENOUS at 05:24

## 2022-05-04 RX ADMIN — PIPERACILLIN AND TAZOBACTAM 3.38 G: 3; .375 INJECTION, POWDER, LYOPHILIZED, FOR SOLUTION INTRAVENOUS at 17:49

## 2022-05-04 RX ADMIN — HEPARIN SODIUM 5000 UNITS: 5000 INJECTION, SOLUTION INTRAVENOUS; SUBCUTANEOUS at 11:13

## 2022-05-04 RX ADMIN — LEVETIRACETAM 500 MG: 5 INJECTION INTRAVENOUS at 20:13

## 2022-05-04 RX ADMIN — LEVETIRACETAM 500 MG: 5 INJECTION INTRAVENOUS at 09:42

## 2022-05-04 RX ADMIN — CARVEDILOL 6.25 MG: 6.25 TABLET, FILM COATED ORAL at 17:52

## 2022-05-04 RX ADMIN — PIPERACILLIN AND TAZOBACTAM 3.38 G: 3; .375 INJECTION, POWDER, LYOPHILIZED, FOR SOLUTION INTRAVENOUS at 11:13

## 2022-05-04 RX ADMIN — HUMAN INSULIN 3 UNITS/HR: 100 INJECTION, SOLUTION SUBCUTANEOUS at 13:20

## 2022-05-04 RX ADMIN — POTASSIUM CHLORIDE 10 MEQ: 7.46 INJECTION, SOLUTION INTRAVENOUS at 17:12

## 2022-05-04 RX ADMIN — HEPARIN SODIUM 5000 UNITS: 5000 INJECTION, SOLUTION INTRAVENOUS; SUBCUTANEOUS at 20:13

## 2022-05-04 ASSESSMENT — ACTIVITIES OF DAILY LIVING (ADL)
ADLS_ACUITY_SCORE: 27
ADLS_ACUITY_SCORE: 29
HEARING_DIFFICULTY_OR_DEAF: NO
ADLS_ACUITY_SCORE: 27
ADLS_ACUITY_SCORE: 29
ADLS_ACUITY_SCORE: 25
DOING_ERRANDS_INDEPENDENTLY_DIFFICULTY: OTHER (SEE COMMENTS)
DIFFICULTY_EATING/SWALLOWING: NO
ADLS_ACUITY_SCORE: 29
ADLS_ACUITY_SCORE: 29
ADLS_ACUITY_SCORE: 25
CHANGE_IN_FUNCTIONAL_STATUS_SINCE_ONSET_OF_CURRENT_ILLNESS/INJURY: YES
ADLS_ACUITY_SCORE: 25
ADLS_ACUITY_SCORE: 25
WALKING_OR_CLIMBING_STAIRS_DIFFICULTY: OTHER (SEE COMMENTS)
ADLS_ACUITY_SCORE: 27
DEPENDENT_IADLS:: CLEANING;COOKING;LAUNDRY;SHOPPING;MEAL PREPARATION;MEDICATION MANAGEMENT;MONEY MANAGEMENT;TRANSPORTATION
ADLS_ACUITY_SCORE: 27
COMMUNICATION: UNABLE TO SPEAK;UNABLE TO UNDERSTAND
FALL_HISTORY_WITHIN_LAST_SIX_MONTHS: YES
TOILETING_ISSUES: OTHER (SEE COMMENTS)
ADLS_ACUITY_SCORE: 29
DIFFICULTY_COMMUNICATING: YES
WEAR_GLASSES_OR_BLIND: NO
ADLS_ACUITY_SCORE: 29
ADLS_ACUITY_SCORE: 25
ADLS_ACUITY_SCORE: 25
CONCENTRATING,_REMEMBERING_OR_MAKING_DECISIONS_DIFFICULTY: YES
ADLS_ACUITY_SCORE: 25
ADLS_ACUITY_SCORE: 25
ADLS_ACUITY_SCORE: 27
ADLS_ACUITY_SCORE: 25
ADLS_ACUITY_SCORE: 25
ADLS_ACUITY_SCORE: 29
ADLS_ACUITY_SCORE: 25
ADLS_ACUITY_SCORE: 25
DRESSING/BATHING_DIFFICULTY: OTHER (SEE COMMENTS)

## 2022-05-04 NOTE — PROGRESS NOTES
Brandin benz for 5/3/22 1700  NURSING PROGRESS NOTE  Patient Transfer In    Report received from christopher Rosa RN in ED.    Patient deemed stable for transfer.    Patient daughter is aware of plans.     Patient transported to  on tele, room air with Nursing staff and transport.    Unable to orient pt to room and call lite use due to condition. Telemetry placed on patient and initial vital signs completed. Skin assessment completed by two RNs as part of initial assessment.      Belongings:  Clothing       Michelle Thomas RN .................................................... May 4, 2022   7:55 AM  [facility] ([facility]): [place]  Stepdown ICU (Unit 6D)

## 2022-05-04 NOTE — PHARMACY-VANCOMYCIN DOSING SERVICE
"Pharmacy Vancomycin Note  Date of Service May 4, 2022  Patient's  1960   61 year old, female    Indication: Sepsis  Day of Therapy: 2  Current vancomycin regimen: intermittent dosing  Current vancomycin monitoring method: Trough (Method 2 = manual dose calculation)  Current vancomycin therapeutic monitoring goal: 15-20 mg/L    Current estimated CrCl = Estimated Creatinine Clearance: 41.9 mL/min (A) (based on SCr of 1.29 mg/dL (H)).    Creatinine for last 3 days  5/3/2022:  2:27 AM Creatinine 1.84 mg/dL;  6:19 AM Creatinine 1.61 mg/dL;  8:57 AM Creatinine 1.49 mg/dL;  9:36 AM Creatinine 1.42 mg/dL; 11:33 AM Creatinine 1.45 mg/dL;  3:42 PM Creatinine 1.48 mg/dL;  2:28 AM Creatinine POCT 2.0 mg/dL  2022:  7:39 AM Creatinine 1.29 mg/dL    Recent Vancomycin Levels (past 3 days)  2022:  7:39 AM Vancomycin 10.5 mg/L    Vancomycin IV Administrations (past 72 hours)                   vancomycin 1250 mg in 0.9% NaCl 250 mL intermittent infusion 1,250 mg (mg) 1,250 mg New Bag 22 0446                Nephrotoxins and other renal medications (From now, onward)    Start     Dose/Rate Route Frequency Ordered Stop    22 0900  vancomycin (VANCOCIN) 1000 mg in dextrose 5% 200 mL PREMIX         1,000 mg  200 mL/hr over 1 Hours Intravenous EVERY 24 HOURS 22 0852      22 0500  piperacillin-tazobactam (ZOSYN) 2.25 g vial to attach to  ml bag        Note to Pharmacy: For [facility], [facility] and [facility]: For Zosyn-naive patients, use the \"Zosyn initial dose + extended infusion\" order panel.    2.25 g  over 30 Minutes Intravenous EVERY 6 HOURS 22 0434               Contrast Orders - past 72 hours (72h ago, onward)    Start     Dose/Rate Route Frequency Stop    22 1830  gadobutrol (GADAVIST) injection 7.5 mL         7.5 mL Intravenous ONCE 22 1812    22 0255  iopamidol (ISOVUE-370) solution 75 mL         75 mL Intravenous ONCE 22 0252        Interpretation of levels and current " regimen:  Vancomycin level is reflective of subtherapeutic level. Added patient data into [employer] software. Likely okay for scheduled regimen using AUC monitoring given improving EVON but will check another level tomorrow morning prior to 2nd dose to be sure.    Has serum creatinine changed greater than 50% in last 72 hours: No  Urine output: unable to determine  Renal Function: Improving    [employer] Prediction of Planned New Vancomycin Regimen  Regimen: 1000 mg IV every 24 hours.  Start time: 08:50 on 05/04/2022  Exposure target: AUC24 (range)400-600 mg/L.hr   AUC24,ss: 525 mg/L.hr  Probability of AUC24 > 400: 92 %  Ctrough,ss: 16.0 mg/L  Probability of Ctrough,ss > 20: 18 %  Probability of nephrotoxicity (Lodise GABRIELA 2009): 11 %    Plan:  1. Start scheduled regimen of 1000 mg IV q24h  2. Vancomycin monitoring method: AUC  3. Vancomycin therapeutic monitoring goal: 400-600 mg*h/L  4. Pharmacy will check vancomycin levels as appropriate in 1-3 Days.  5. Serum creatinine levels will be ordered daily for the first week of therapy and at least twice weekly for subsequent weeks.    Keisha Arambula formerly [facility]

## 2022-05-04 NOTE — PROGRESS NOTES
"CLINICAL NUTRITION SERVICES - ASSESSMENT NOTE     Nutrition Prescription    RECOMMENDATIONS FOR MDs/PROVIDERS TO ORDER:  Diet adv v nutrition support within 2-3 days.    Malnutrition Status:    Severe malnutrition in the context of chronic illness    Recommendations already ordered by Registered Dietitian (RD):  None at this time- pt NPO    Future/Additional Recommendations:  1. Monitor for diet advancement. Provide oral supplements as needed.     2. If unable to advance diet recommend starting EN:  Osmolite 1.5 Antonio @ goal of  55ml/hr  (1320ml/day)  will provide: 1980 kcals (33 kcal/kg), 83 g PRO (1.4 g/kg), 1005 ml free H20, 268 g CHO, and 0 g fiber daily.   - Initiate @ 10 ml/hr and advance by 10 ml q8hr pending pt's tolerance  - Do not start or advance TF rate unless Mg++ >1.5, K+ is >3, and phos >1.9  - Recommend 60 ml q4hr fluid flushes for tube patency. Additional fluids and/or adjustments per MD.    - Order multivitamin/mineral (15 ml/day via FT) to help ensure micronutrient needs being met with suspected hypermetabolic demands and potential interruptions to TF infusions.  - HOB >30 degrees if FT is gastric.     REASON FOR ASSESSMENT  Isabell Matthews is a/an 61 year old female assessed by the dietitian for Admission Nutrition Risk Screen for positive- unsure of weight loss    NUTRITION HISTORY  Clinical History: acute ischemic stroke of right frontopperital punctate stroke due to small-vessel occlusion (was hospitalized in 4/5/22), seizure disorder, DMII, CKD3, HTN, HLD, CAD s/p PCI, hypothyroidism, vascular dementia, presenting with hyperglycemia 2/2 DKA.     Unable to obtain nutrition history from pt due to pt's mentation.     CURRENT NUTRITION ORDERS  Diet: NPO  Intake/Tolerance: NPO since admit    LABS  Labs reviewed  K+ 2.8 (L)  Cr 1.29 (H)  BG 5/3-5/4:     MEDICATIONS  Medications reviewed  Vit B12  Vit D3 1,000 units  Insulin drip    ANTHROPOMETRICS  Height: 5' 3\"   Most Recent Weight: 59.5 kg (131 " lb 2.8 oz)    IBW: 52.3 kg (114%)   BMI: Normal BMI  Weight History: Weight loss of 3.8 kg (6%) over the past 1 month. Weight loss of 13.1 kg (18%) over the past 6 months.   Wt Readings from Last 7 Encounters:   05/04/22 59.5 kg (131 lb 2.8 oz)   04/07/22 63.3 kg (139 lb 8.8 oz)   10/25/21 72.6 kg (160 lb)   07/26/21 73.9 kg (163 lb)   07/13/21 79 kg (174 lb 3.2 oz)   07/05/21 75.8 kg (167 lb 3.2 oz)   07/05/21 75.8 kg (167 lb 3.2 oz)     Dosing Weight: 60 kg (admit weight)     ASSESSED NUTRITION NEEDS  Estimated Energy Needs: 8043-7919 kcals/day (30 - 35 kcals/kg )  Justification: Repletion  Estimated Protein Needs: 70-90 grams protein/day (1.2 - 1.5 grams of pro/kg)  Justification: Hypercatabolism with acute illness  Estimated Fluid Needs: 1585-9366 mL/day (25 - 30 mL/kg)   Justification: Maintenance    PHYSICAL FINDINGS  See malnutrition section below.    MALNUTRITION  % Intake: Unable to assess  % Weight Loss: > 10% in 6 months (severe)  Subcutaneous Fat Loss: Facial region: mild   Muscle Loss: Temporal: mild, Upper arm (bicep, tricep): mild, Dorsal hand: mild, Upper leg (quadricep, hamstring): moderate and Posterior calf: moderate  Fluid Accumulation/Edema: None noted  Malnutrition Diagnosis: Severe malnutrition in the context of chronic illness    NUTRITION DIAGNOSIS  Inadequate oral intake related to NPO as evidenced by meeting 0% nutrition needs currently       INTERVENTIONS  Implementation  Nutrition Education: Unable to complete due to not appropriate at this time.    Enteral Nutrition: Recommendations above      Goals  Diet adv v nutrition support within 2-3 days.     Monitoring/Evaluation  Progress toward goals will be monitored and evaluated per protocol.    Ashley Jones, RD, LD  6D RD pager [phone]

## 2022-05-04 NOTE — CONSULTS
Care Management Initial Consult    General Information  Assessment completed with: jose armando Leal/PCA by phone, chart review  Type of CM/SW Visit: Initial Assessment  Primary Care Provider verified and updated as needed: Yes   Readmission within the last 30 days:   yes  Advance Care Planning: Advance Care Planning Reviewed: per bedside staff         Communication Assessment  Patient's communication style: spoken language (English or Bilingual)    Hearing Difficulty or Deaf: no        Cognitive  Cognitive/Neuro/Behavioral: .WDL except  Level of Consciousness: lethargic, obtunded  Arousal Level: arouses to voice  Orientation:  (elida)  Mood/Behavior: hypoactive (quiet, withdrawn), withdrawn  Best Language: 0 - No aphasia  Speech: hesitant    Living Environment:   People in home:  (daughter/Mel LUCIO)     Current living Arrangements: apartment      Able to return to prior arrangements:  (PT/OT pending.)       Family/Social Support:  Care provided by:  Mel cummings; home care  Provides care for: no one, unable/limited ability to care for self    Description of Support System: Supportive, Involved         Current Resources:   Patient receiving home care services: [facility] (RN/PT/OT) Phone: [phone]  Community Resources: County Worker, PCA  Equipment currently used at home: wheelchair, manual, walker, rolling, shower chair, grab bar, toilet, grab bar, tub/shower  Supplies currently used at home:      Employment/Financial:  Employment Status: disabled     Financial Concerns: No concerns identified       Functional Status:  Prior to admission patient needed assistance:   Dependent ADLs:: Ambulation-walker, Bathing, Dressing, Eating, Grooming, Wheelchair-with assist, Toileting  Dependent IADLs:: Cleaning, Cooking, Laundry, Shopping, Meal Preparation, Medication Management, Money Management, Transportation          Additional Information:  Initial assessment completed due to high risk readmission score.  Spoke with daughter, Mel by phone who is pt PCA and cares for her full time. Per Mel, pt is a total assist and pretty much needs helps with everything including - med management, bathing, tranfers, transport. She needs to be watched while she eats, otherwise she won't eat. Lately instead of a walker, pt has needed to be moved around by wheel chair. She is currently open to [facility] (RN/PT/OT)    Care management will follow for any discharge needs.          Conchita Burks RN, [place]  Float Care Coordinator  Covering 6D [facility]   Pager: [phone]

## 2022-05-04 NOTE — PROGRESS NOTES
VEEG monitoring preliminary results:    VEEG has been running for over one hour.  EEG showed moderate to severe generalized slowing with mixed theta and delta activities. Findings are consistent with moderate to severe diffuse encephalopathy.  No epileptiform activities, no clinical or electrographic seizures were observed.      Penelope Tamez MD  Neurology

## 2022-05-04 NOTE — PROGRESS NOTES
NURSING PROGRESS NOTE  Shift Summary    Pertinent Shift Updates: Cared for pt from 1514-3376. No acute changes. Agree with previous RN's assessment. K lab redraw due at 2030.    Bela Birch RN  .................................................... May 4, 2022 7:00 PM  [facility] ([facility]): [place]  Stepdown ICU (Unit 6D)

## 2022-05-04 NOTE — PHARMACY-ADMISSION MEDICATION HISTORY
Admission Medication History Completed by Pharmacy    See [facility] Admission Navigator for allergy information, preferred outpatient pharmacy, prior to admission medications and immunization status.     Medication History Sources:     Surescripts, patient's daughter who takes care of patient's meds    Changes made to PTA medication list (reason):    Added: None    Deleted: clotrimazole, terbinafine    Changed: Lantus (updated dose), Humalog (added note about dose), melatonin (daily --> PRN)    Additional Information:    Doxazosin & nifedipine: daughter reports these medications have been held by prescriber for about a week for low blood pressures.     Levothyroxine: daughter reports patient had neurologic side effects to generic product and only takes Synthroid    Prior to Admission medications    Medication Sig Last Dose Taking? Auth Provider   aspirin (ASA) 81 MG chewable tablet Take 1 tablet (81 mg) by mouth daily 5/2/2022 Yes Julio C Hay MD   atorvastatin (LIPITOR) 40 MG tablet Take 1 tablet (40 mg) by mouth daily 5/2/2022 Yes Bony Castaneda MD   carvedilol (COREG) 6.25 MG tablet 1 tablet (6.25 mg) by Oral or Feeding Tube route 2 times daily (with meals) 5/2/2022 Yes Bony Castaneda MD   cyanocobalamin (VITAMIN B-12) 1000 MCG tablet Take 1 tablet (1,000 mcg) by mouth daily 5/2/2022 Yes Bony Castaneda MD   diclofenac (VOLTAREN) 1 % topical gel Apply 2 g topically 4 times daily as needed for moderate pain  at PRN Yes Bony Castaneda MD   doxazosin (CARDURA) 1 MG tablet Take 1 tablet (1 mg) by mouth At Bedtime Past Month at Unknown time Yes Bony Castaneda MD   DULoxetine (CYMBALTA) 20 MG capsule Take 1 capsule (20 mg) by mouth daily 5/2/2022 Yes Bony Castaneda MD   insulin glargine (LANTUS PEN) 100 UNIT/ML pen Please inject 18 units at bedtime change of dose  Patient taking differently: Inject 14-18 Units Subcutaneous At Bedtime Please inject 18 units at bedtime change of dose  5/2/2022 Yes Bony Castaneda MD   insulin lispro (HUMALOG) 100 UNIT/ML Cartridge Take prior to meal with sliding scale per glucose level adjust as noted in notes usual dose around 20 units daily,1 unit to max 13 units Prior to meals taking 2 meals daily,1 unit with food premeal plus additional 140-169 [phone] -199 (4) 200-229(5) 230-259(6) 260-289( 7) 290-319(8) 320-349(9) 350-379 (10) 380-409(11) greater 410(12)y  Yes Bony aCstaneda MD   levETIRAcetam (KEPPRA) 500 MG tablet Take 1 tablet (500 mg) by mouth 2 times daily 5/2/2022 Yes Bony Castaneda MD   loratadine (CLARITIN) 10 mg tablet [LORATADINE (CLARITIN) 10 MG TABLET] Take 10 mg by mouth daily. 5/2/2022 Yes Provider, Historical   melatonin 10 mg Tab Take 10 mg by mouth nightly as needed  at PRN Yes Provider, Historical   NIFEdipine ER (ADALAT CC) 60 MG 24 hr tablet Take 1 tablet (60 mg) by mouth daily Past Month at Unknown time Yes Bony Castaneda MD   pregabalin (LYRICA) 75 MG capsule TAKE 1 CAPSULE(75 MG) BY MOUTH TWICE DAILY 5/2/2022 Yes Bony Castaneda MD   SYNTHROID 88 MCG tablet Take 1 tablet (88 mcg) by mouth daily 5/2/2022 Yes Bony Castaneda MD   Vitamin D3 (CHOLECALCIFEROL) 25 mcg (1000 units) tablet Take 1 tablet (25 mcg) by mouth daily 5/2/2022 Yes Bony Castaneda MD   blood glucose (NO BRAND SPECIFIED) test strip Use to test blood sugar 4-5 times daily or as directed.   Bony Castaneda MD   insulin pen needle (31G X 8 MM) 31G X 8 MM miscellaneous Use 4 pen needles daily or as directed.   Bony Castaneda MD     Date completed: 05/04/22    Medication history completed by: Keisha Arambula [facility]

## 2022-05-04 NOTE — PROGRESS NOTES
Patient remained on Insulin drip overnight, required titration as high as 12 units/hr, currently BG stable on 1unit/hr. Mentation slightly improved overnight, patient was able to say her name when questioned. Vitals Stable.

## 2022-05-04 NOTE — PROGRESS NOTES
[facility]    Medicine Progress Note - Hospitalist Service, GOLD TEAM 10    Date of Admission:  5/3/2022    Assessment & Plan        Isabell Matthews is a 61 year old female, with h/o acute ischemic stroke of right frontopperital punctate stroke due to small-vessel occlusion (was hospitalized in 4/5/22), multiple lacunar and punctate ischemic infarcts and multifocal moderate to severe stenosis in multiple vascular territories, seizure disorder, DMII, CKD3, HTN, HLD, CAD s/p PCI, hypothyroidism, vascular dementia, presenting with AMS/aphasia, found to have hyperglycemia 2/2 DKA and elevated LA.    Changes today   Continue insulin drip while confused  EEG showing diffuse slow wave consistent with encephalopathy. Some improvement in mentation today. Suspect toxic metabolic encephalopathy.   Q6 hour VBG and BMP. Gap corrected. D10 at 50 ml/hr    Urine cultures are showing aerococcus which is historically sensitive to vancomycin. Will transition based on sensitivities        # Hyperglycemia 2/2 DKA  # UTI  # H/o DMII (No DEMETRIA ab/C-peptide)  Her glucose was 686, ketones 5.4, hgba1c 8.9. Had DKA in 10/2020. Per Endo, suspect insulin dependent DMII.     Etiology: may be due to UTI: UA showing bacteria and WB 15 with small LE. Per neuro note, it states that daughter/care taker has been dealing with respirator illness recently. It may also be due to skipped insulin evening 5/1, daytime 5/2 due to decreased appetite and concern for precipitating hypoglycemia per caretaker/daughter.  - s/p Zosyn and Vanc in ED, continued on medicine floor, may continue per primary team, ucx pending  - holding pta glargine 18 U at bedtime, pta lispro mealtime SSI for now  - pta pregabalin 75 mg bid  - DKA proctocol ordered  - Endocrine consulted  - BMP, phos q2h ordered  - phos, BMP, Mg, CBC, abg ordered  - Bcx x1 pending from ED  - EKG ordered  - pending covid, influenza, respiratory panel     # AGMA  with uncompensated respiratory acidosis  # Hyperkalemia  # Elevated lactate  On admission, ph 7.1/co2 32/bicarb 11. Not compensated. LA 3.2. K 5.5     AGMA due to lactate, ketoacidosis 2/2 DKA. Will improve with fluid resuscitation and insulin. Hyperkalemia 2/2 DKA/AGMA.  - s/p 2 L in ED  - 1 L ordered on medicine floor  - DKA proctocol  - repeat Lactate 0600AM ordered     # Acute on Chronic kidney disease  Cr 1.84 on admission. Cr baseline 1.2-1.45. 2/2 volume depletion 2/2 DKA.  - BMP q2h  - s/p 2 L ivf in ED  - ordered 3rd L IVF     # Acute metabolic toxic encephalopathy  # Aphasia  # Concern for acute stroke  Of note in 4/2022, pt presented with 1 day of difficulty waking and 45 mins of L eye vision loss.  IV thrombolysis was not given  due to unclear or unfavorable risk-benefit profile for extended window thrombolysis beyond the conventional 4.5 hour time window. Etiology was thought to be small vessel disease.  Was recommended asa 81 mg daily with hgba1c goal <7.     Upon admission to the ED, there was concern for aphagia and stroke code was called, CT head showed no acute intracranial hemorrhages, showed small chronic infarcts. CTA head showed no intracranial arterial large vessel occlusion, and CT neck showed left carotid bifurcation moderate stenosis, atherosclerotic plaque results in less than 50% stenosis in the right carotid bulb and proximal cervical ICA, chronic right vertebral artery and proximal V1 with severe stenosis, but overall no significant interval change compared to CTA head and neck 04/10/2022. Not given TPA due to history of intracranial hemorrhage.      Etiology ddx: During stroke code, CT and CTA head imaging did not demonstrate clear new occlusive lesion or developing area of hypodensity concerning for stroke as primary etiology. Most likely toxic metabolic from DKA vs. Infection (respiratory vs UTI).  - pta atorvastatin 40 mg daily  - pta asa 81 mg daily  - q4h neuro checks  - consider  MRI brain per primary team  - pending influenza, covid, respiratory panel     --Chronic diseases--  **Will place essential meds to IV instead of PTA PO since pt has AMS and is not able to do PO, not following commands**     #Seizure disorder  - PTA Keppra 500 mg BID (IV)     #CKD3  - BMP, phos q2h    #HTN  On admission /77.  - PTA coreg 6.25 mg, and nifedipine 60 mg daily (instead of this placed PRN hydralzine 10 mg q6h)     #HLD  - PTA atrova 40 mg daily     #CAD s/p PCI  - PTA coreg and nifedipine 60 mg daily (instead of this placed PRN hydralzine 10 mg q6h, fpr SBP >170)     #Hypothyroidism  - PTA synthroid 88 mcg daily     #Vascular dementia  - Delirium precautions     # MDD  - pta duloxetine 20 mg daily     # Urinary retention  - pta doxazosin          Diet: NPO for Medical/Clinical Reasons Except for: No Exceptions    DVT Prophylaxis: Heparin SQ  Parker Catheter: Not present  Central Lines: None  Cardiac Monitoring: None  Code Status: Full Code      Disposition Plan   Expected Discharge:    Anticipated discharge location:  Awaiting care coordination huddle  Delays:            The patient's care was discussed with the Patient.    Paul Kulkarni MD  Hospitalist Service, [facility] 10  [facility]  Securely message with the Vocera Web Console (learn more here)  Text page via [employer] Paging/Directory   Please see signed in provider for up to date coverage information      Clinically Significant Risk Factors Present on Admission             # Moderate Malnutrition: based on nutrition assessment     ______________________________________________________________________    Interval History   Not responding verbally to command  Occasional yes no or states name     Data reviewed today: I reviewed all medications, new labs and imaging results over the last 24 hours. I personally reviewed no images or EKG's today.    Physical Exam   Vital Signs: Temp: 97.5  F (36.4  C) Temp  src: Axillary BP: (!) 158/85 Pulse: 86   Resp: 18 SpO2: 97 % O2 Device: None (Room air)    Weight: 131 lbs 2.78 oz  Physical Exam  Constitutional:       Appearance: She is not ill-appearing or toxic-appearing.   HENT:      Head: Normocephalic.      Mouth/Throat:      Mouth: Mucous membranes are moist.   Cardiovascular:      Rate and Rhythm: Normal rate and regular rhythm.      Heart sounds: No murmur heard.    No friction rub. No gallop.   Pulmonary:      Effort: Pulmonary effort is normal. No respiratory distress.      Breath sounds: No stridor. No wheezing.   Abdominal:      General: Abdomen is flat. There is no distension.      Palpations: There is no mass.      Tenderness: There is no abdominal tenderness.   Musculoskeletal:      Right lower leg: No edema.      Left lower leg: No edema.   Neurological:      General: No focal deficit present.      Mental Status: She is oriented to person, place, and time.   Psychiatric:         Mood and Affect: Mood normal.         Behavior: Behavior normal.         Data   Recent Labs   Lab 05/04/22  1600 05/04/22  1502 05/04/22  1422 05/04/22  1209 05/04/22  1141 05/04/22  1006 05/04/22  1000 05/04/22  0804 05/04/22  0739 05/03/22  1602 05/03/22  1542 05/03/22  0936 05/03/22  0857 05/03/22  0401 05/03/22  0235 05/03/22  0229 05/03/22  0228 05/03/22  0227   WBC  --   --   --   --   --   --  8.1  --   --   --   --   --  8.3  --   --   --   --  8.3   HGB  --   --   --   --   --   --  11.2*  --   --   --   --   --  10.6*  --  13.9  --   --  12.7   MCV  --   --   --   --   --   --  90  --   --   --   --   --  91  --   --   --   --  95   PLT  --   --   --   --   --   --  161  --   --   --   --   --  209  --   --   --   --  239   INR  --   --   --   --   --   --   --   --   --   --   --   --   --   --   --  1.3*  --   --    NA  --   --   --   --  144  --  144  --   --   --  148*   < > 144   < > 137  --   --  136   POTASSIUM  --   --   --   --  2.8*  --  2.8*  --   --   --  3.4   < >  3.6   < > 5.6*  --   --  5.5*   CHLORIDE  --   --   --   --  112*  --  115*  --   --   --  119*   < > 116*   < >  --   --   --  102   CO2  --   --   --   --  24  --  23  --   --   --  21   < > 18*   < >  --   --   --  11*   BUN  --   --   --   --  17  --  19  --   --   --  35*   < > 38*   < >  --   --   --  49*   CR  --   --   --   --  1.31*  --  1.29*  --  1.29*  --  1.48*   < > 1.49*   < >  --   --    < > 1.84*   ANIONGAP  --   --   --   --  8  --  6  --   --   --  8   < > 10   < >  --   --   --  23*   VERONICA  --   --   --   --  8.2*  --  8.6  --   --   --  8.8   < > 8.3*   < >  --   --   --  9.5   * 124* 123*   < > 134*   < > 101*   < >  --    < > 106*   < > 227*  223*   < > 624*  --   --  686*   ALBUMIN  --   --   --   --   --   --  2.4*  --   --   --   --   --   --   --   --   --   --  3.4   PROTTOTAL  --   --   --   --   --   --  5.4*  --   --   --   --   --   --   --   --   --   --  7.7   BILITOTAL  --   --   --   --   --   --  0.3  --   --   --   --   --   --   --   --   --   --  0.7   ALKPHOS  --   --   --   --   --   --  132  --   --   --   --   --   --   --   --   --   --  221*   ALT  --   --   --   --   --   --  15  --   --   --   --   --   --   --   --   --   --  23   AST  --   --   --   --   --   --  41  --   --   --   --   --   --   --   --   --   --  28    < > = values in this interval not displayed.

## 2022-05-04 NOTE — CONSULTS
"Diabetes/Hyperglycemia Management Consult    Chief Complaint glycemic management recommendations  Consult requested by: Rita Tirado MD  History of Present Illness Isabell Matthews is a 61 year old female, with h/o  seizure disorder, type 2 diabetes treated as type 1, CKD3, HTN, HLD, CAD s/p PCI, hypothyroidism, vascular dementia, multiple prior ischemic strokes and recent acute ischemic stroke of right frontoparietal punctate stroke due to small-vessel occlusion (was hospitalized in 4/5/22), presenting with hyperglycemia 2/2 DKA.  She was evaluated for stroke on admission, given AMS, aphasia.  Per ED notes, Usually Isabell is alert and oriented, converses normally.  EEG ongoing, completed MRI brain and carotid dopplers.  Being treated for UTI.  DKA resolved yesterday.  Today Isabell verbalized when daughter was on the phone.  Attempted pills/pudding but \"froze\" per nursing.  She was not verbally expressive during interview.  Isabell remains NPO, on IV insulin, and receiving D10W at 50 ml/h.  Using 0-3 units/h IV insulin this day shift.      Per notes, daughter stated that pt skipped insulin evening 5/1, daytime 5/2 due to decreased appetite and concern for precipitating hypoglycemia.  Daughter noted high glucose just before calling for EMS late on 5/2 or very early on 5/3, gave 10 units Lantus and 5 units of short acting insulin.        Diabetes Type: 1 versus 2, [facility], treated as type 1  [facility]RIA ab [facility]  2011-\"She presented  overnight on April 3, 2011, due to increasing nausea, vomiting, and  was found to be in diabetic ketoacidosis. She was admitted to the  intensive care unit, put on an insulin drip, and was followed by Dr. Parry, endocrinology. After the resolution of her ketoacidosis, she  was started on an insulin regimen with Lantus and NovoLog and was told  that she has type 1 diabetes and will always require insulin  \"  Diabetes Duration: 24 " years  Usual Diabetes Regimen:   BID to TID BG monitoring frequency  Moderate carb diet, usually two main meals per day?  Limited physical activity    insulin glargine (LANTUS PEN) 100 UNIT/ML pen Please inject 18 units at bedtime change of dose Bony Castaneda MD Needs Review   insulin lispro (HUMALOG) 100 UNIT/ML Cartridge Take prior to meal with sliding scale per glucose level adjust as noted in notes usual dose around 20 units daily,1 unit to max 13 units Prior to meals taking 2 meals daily,1 unit with food premeal plus additional 140-169 (3)170 -199 (4) 200-229(5) 230-259(6) 260-289( 7) 290-319(8) 320-349(9) 350-379 (10) 380-409(11) greater 410(12)         Ability to [place] Prescribed Regimen: impaired, dementia.  Daughter Maria Guadalupe is caregiver for last 8 months  Diabetes Control:   Lab Results   Component Value Date    A1C 8.9 05/03/2022    A1C 8.8 04/05/2022    A1C 8.2 08/02/2021    A1C 7.8 06/21/2021    A1C 11.7 09/27/2020       Diabetes Complications: peripheral neuropathy  History of DKA: yes  Able to Detect Hypoglycemia: impaired by cognitive decline  Usual Diabetes Care Provider: PCP is Dr. Castaneda, last office visit in October 2021  Factors Impacting Glucose Control: acute illness, chronic hyperglycemia, cognitive impairment, nutritional decline      Review of Systems  10 point ROS completed with pertinent positives and negatives noted in the HPI    Past medical, family and social histories are reviewed and updated.    Past Medical History  Past Medical History:   Diagnosis Date     Chronic pain      Coronary atherosclerosis 6/14/2016     Essential hypertension      Ex-cigarette smoker     quit 7/2018 over 35 years     Ex-cigarette smoker      Hyperlipidemia      Hypothyroid      Seizures (H)      Stroke (H)      Vascular dementia (H)        Family History  No diabetes documented    Social History  Social History     Socioeconomic History     Marital status:      Spouse name: Not on file      Number of children: Not on file     Years of education: Not on file     Highest education level: Not on file   Occupational History     Not on file   Social Needs     Financial resource strain: Not on file     Food insecurity     Worry: Not on file     Inability: Not on file     Transportation needs     Medical: Not on file     Non-medical: Not on file   Tobacco Use     Smoking status: Former Smoker     Packs/day: 0.50     Years: 35.00     Pack years: 17.50     Types: Cigarettes     Last attempt to quit: 2018     Years since quittin.2     Smokeless tobacco: Never Used   Substance and Sexual Activity     Alcohol use: Not Currently     Drug use: Not on file     Sexual activity: Not Currently   Lifestyle     Physical activity     Days per week: Not on file     Minutes per session: Not on file     Stress: Not on file   Relationships     Social connections     Talks on phone: Not on file     Gets together: Not on file     Attends Synagogue service: Not on file     Active member of club or organization: Not on file     Attends meetings of clubs or organizations: Not on file     Relationship status: Not on file     Intimate partner violence     Fear of current or ex partner: Not on file     Emotionally abused: Not on file     Physically abused: Not on file     Forced sexual activity: Not on file   Other Topics Concern     Not on file   Social History Narrative    Recently moved to [facility] with Daughter Charli who is her pca         Physical Exam  Temp: 98.1  F (36.7  C) Temp src: Axillary BP: (!) 160/77 Pulse: 88   Resp: 16 SpO2: 96 % O2 Device: None (Room air)    Body mass index is 23.24 kg/m .  Wt Readings from Last 4 Encounters:   22 59.5 kg (131 lb 2.8 oz)   22 63.3 kg (139 lb 8.8 oz)   10/25/21 72.6 kg (160 lb)   21 73.9 kg (163 lb)       General:  Middle aged woman resting in bed, in no acute distress, but not content. Markedly thinner than when seen last in   HEENT: NC/AT, PER and  anicteric, non-injected, oral mucous membranes sl dry  Lungs: unlabored respiration, no cough  ABD: rounded  Skin: dry, no obvious lesions  MSK: thin limbs  Lymp:  no LE edema   Mental status: arouses to voice, tracking, no verbal response despite extended time and prompting  Psych:  calm, smiled once    Laboratory  Recent Labs   Lab Test 05/04/22  1318 05/04/22  1209 05/04/22  1141 05/04/22  1006 05/04/22  1000   NA  --   --  144  --  144   POTASSIUM  --   --  2.8*  --  2.8*   CHLORIDE  --   --  112*  --  115*   CO2  --   --  24  --  23   ANIONGAP  --   --  8  --  6   * 128* 134*   < > 101*   BUN  --   --  17  --  19   CR  --   --  1.31*  --  1.29*   VERONICA  --   --  8.2*  --  8.6    < > = values in this interval not displayed.       CBC RESULTS: Recent Labs   Lab Test 05/04/22  1000   WBC 8.1   RBC 3.92   HGB 11.2*   HCT 35.3   MCV 90   MCH 28.6   MCHC 31.7   RDW 13.9        Liver Function Studies - Recent Labs   Lab Test 05/04/22  1000   PROTTOTAL 5.4*   ALBUMIN 2.4*   BILITOTAL 0.3   ALKPHOS 132   AST 41   ALT 15     Active Medications  Current Facility-Administered Medications   Medication     aspirin (ASA) chewable tablet 81 mg     atorvastatin (LIPITOR) tablet 40 mg     carvedilol (COREG) tablet 6.25 mg     cyanocobalamin (VITAMIN B-12) tablet 1,000 mcg     dextrose 10% infusion     dextrose 50 % injection 25-50 mL     glucose gel 15-30 g    Or     dextrose 50 % injection 25-50 mL    Or     glucagon injection 1 mg     doxazosin (CARDURA) tablet 1 mg     DULoxetine (CYMBALTA) DR capsule 20 mg     heparin ANTICOAGULANT injection 5,000 Units     hydrALAZINE (APRESOLINE) injection 10 mg     insulin 1 unit/mL in saline (NovoLIN, HumuLIN Regular) drip - ADULT IV Infusion     levETIRAcetam (KEPPRA) intermittent infusion 500 mg     [Held by provider] levETIRAcetam (KEPPRA) tablet 500 mg     levothyroxine (SYNTHROID/LEVOTHROID) tablet 88 mcg     lidocaine (LMX4) cream     lidocaine 1 % 0.1-1 mL      melatonin tablet 1 mg     NIFEdipine ER (ADALAT CC) 24 hr tablet 60 mg     ondansetron (ZOFRAN-ODT) ODT tab 4 mg    Or     ondansetron (ZOFRAN) injection 4 mg     piperacillin-tazobactam (ZOSYN) 3.375 g vial to attach to  mL bag     potassium chloride 10 mEq in 100 mL sterile water intermittent infusion (premix)     pregabalin (LYRICA) capsule 75 mg     senna-docusate (SENOKOT-S/PERICOLACE) 8.6-50 MG per tablet 1 tablet    Or     senna-docusate (SENOKOT-S/PERICOLACE) 8.6-50 MG per tablet 2 tablet     sodium chloride (PF) 0.9% PF flush 3 mL     sodium chloride (PF) 0.9% PF flush 3 mL     sodium chloride (PF) 0.9% PF flush 3 mL     sodium chloride (PF) 0.9% PF flush 3 mL     vancomycin (VANCOCIN) 1000 mg in dextrose 5% 200 mL PREMIX     Vitamin D3 (CHOLECALCIFEROL) tablet 25 mcg     No current outpatient medications on file.       Current Diet  Orders Placed This Encounter      NPO for Medical/Clinical Reasons Except for: No Exceptions        Assessment  Isabell Matthews is a 61 year old female, with h/o  seizure disorder, type 2 diabetes treated as type 1, CKD3, HTN, HLD, CAD s/p PCI, hypothyroidism, vascular dementia, multiple prior ischemic strokes and recent acute ischemic stroke of right frontoparietal punctate stroke due to small-vessel occlusion (was hospitalized in 4/5/22), presenting with DKA in setting of infection and withheld insulin.    Plan  - recommend maintenance IVF containing dextrose (then the PRN D10W order can be discontinued)  - added aspart for carb intake 1 unit per 15 grams AC, HS and PRN snacks  - IV insulin to continue while awaiting diet advancement/ nutrition support plan   - BG q1-h per IV insulin protocol  - hypoglycemia protocol  - transition to glargine likely tomorrow  - follow up with pt's daughter/PCA (voice message left with Maria Guadalupe this afternoon)        We will follow.    Jujuamador To APRN -[id number]    Diabetes Management Team job code: [id number]  I spent a total of 85 minutes  bedside and on the inpatient unit managing the glycemic care of Isabell Matthews. Over 50% of my time on the unit was spent counseling the patient and daughter and/or coordinating care regarding acute glucose management, planning for discharge.  See note for details.

## 2022-05-04 NOTE — PLAN OF CARE
Major Shift Events: Patient seems more alert today, she responses verbally to some questions but it is inconsistent. She also inconsistently follows comments.Pt will continues on insuline drip and dextrose until she can eat, per provider. EEG discharge, no new orders.  Plan: Continue Insuline drip and dextrose.   For vital signs and complete assessments, please see documentation flowsheets.        Problem: Plan of Care - These are the overarching goals to be used throughout the patient stay.    Goal: Plan of Care Review/Shift Note  Description: The Plan of Care Review/Shift note should be completed every shift.  The Outcome Evaluation is a brief statement about your assessment that the patient is improving, declining, or no change.  This information will be displayed automatically on your shift note.  Outcome: Ongoing, Progressing  Flowsheets (Taken 5/4/2022 1625)  Plan of Care Reviewed With: patient  Overall Patient Progress: improving  Goal: Optimal Comfort and Wellbeing  Outcome: Ongoing, Progressing     Problem: Hyperglycemia  Goal: Blood Glucose Level Within Targeted Range  Outcome: Ongoing, Progressing

## 2022-05-05 ENCOUNTER — APPOINTMENT (OUTPATIENT)
Dept: GENERAL RADIOLOGY | Facility: CLINIC | Age: 62
DRG: 637 | End: 2022-05-05
Attending: INTERNAL MEDICINE
Payer: MEDICARE

## 2022-05-05 ENCOUNTER — APPOINTMENT (OUTPATIENT)
Dept: ULTRASOUND IMAGING | Facility: CLINIC | Age: 62
DRG: 637 | End: 2022-05-05
Attending: INTERNAL MEDICINE
Payer: MEDICARE

## 2022-05-05 LAB
ALBUMIN SERPL-MCNC: 2.3 G/DL (ref 3.4–5)
ALP SERPL-CCNC: 129 U/L (ref 40–150)
ALT SERPL W P-5'-P-CCNC: 16 U/L (ref 0–50)
ANION GAP SERPL CALCULATED.3IONS-SCNC: 10 MMOL/L (ref 3–14)
ANION GAP SERPL CALCULATED.3IONS-SCNC: 7 MMOL/L (ref 3–14)
AST SERPL W P-5'-P-CCNC: 27 U/L (ref 0–45)
BASE EXCESS BLDV CALC-SCNC: -3.1 MMOL/L (ref -7.7–1.9)
BASE EXCESS BLDV CALC-SCNC: -3.1 MMOL/L (ref -7.7–1.9)
BASE EXCESS BLDV CALC-SCNC: -4.1 MMOL/L (ref -7.7–1.9)
BILIRUB SERPL-MCNC: 0.4 MG/DL (ref 0.2–1.3)
BUN SERPL-MCNC: 12 MG/DL (ref 7–30)
BUN SERPL-MCNC: 13 MG/DL (ref 7–30)
CALCIUM SERPL-MCNC: 8.1 MG/DL (ref 8.5–10.1)
CALCIUM SERPL-MCNC: 8.1 MG/DL (ref 8.5–10.1)
CHLORIDE BLD-SCNC: 112 MMOL/L (ref 94–109)
CHLORIDE BLD-SCNC: 113 MMOL/L (ref 94–109)
CO2 SERPL-SCNC: 19 MMOL/L (ref 20–32)
CO2 SERPL-SCNC: 22 MMOL/L (ref 20–32)
CREAT SERPL-MCNC: 1.39 MG/DL (ref 0.52–1.04)
CREAT SERPL-MCNC: 1.46 MG/DL (ref 0.52–1.04)
ERYTHROCYTE [DISTWIDTH] IN BLOOD BY AUTOMATED COUNT: 14.1 % (ref 10–15)
GFR SERPL CREATININE-BSD FRML MDRD: 40 ML/MIN/1.73M2
GFR SERPL CREATININE-BSD FRML MDRD: 42 ML/MIN/1.73M2
GLUCOSE BLD-MCNC: 126 MG/DL (ref 70–99)
GLUCOSE BLD-MCNC: 128 MG/DL (ref 70–99)
GLUCOSE BLDC GLUCOMTR-MCNC: 110 MG/DL (ref 70–99)
GLUCOSE BLDC GLUCOMTR-MCNC: 115 MG/DL (ref 70–99)
GLUCOSE BLDC GLUCOMTR-MCNC: 116 MG/DL (ref 70–99)
GLUCOSE BLDC GLUCOMTR-MCNC: 117 MG/DL (ref 70–99)
GLUCOSE BLDC GLUCOMTR-MCNC: 122 MG/DL (ref 70–99)
GLUCOSE BLDC GLUCOMTR-MCNC: 125 MG/DL (ref 70–99)
GLUCOSE BLDC GLUCOMTR-MCNC: 131 MG/DL (ref 70–99)
GLUCOSE BLDC GLUCOMTR-MCNC: 132 MG/DL (ref 70–99)
GLUCOSE BLDC GLUCOMTR-MCNC: 137 MG/DL (ref 70–99)
GLUCOSE BLDC GLUCOMTR-MCNC: 139 MG/DL (ref 70–99)
GLUCOSE BLDC GLUCOMTR-MCNC: 140 MG/DL (ref 70–99)
GLUCOSE BLDC GLUCOMTR-MCNC: 143 MG/DL (ref 70–99)
GLUCOSE BLDC GLUCOMTR-MCNC: 144 MG/DL (ref 70–99)
GLUCOSE BLDC GLUCOMTR-MCNC: 146 MG/DL (ref 70–99)
GLUCOSE BLDC GLUCOMTR-MCNC: 151 MG/DL (ref 70–99)
GLUCOSE BLDC GLUCOMTR-MCNC: 152 MG/DL (ref 70–99)
GLUCOSE BLDC GLUCOMTR-MCNC: 156 MG/DL (ref 70–99)
GLUCOSE BLDC GLUCOMTR-MCNC: 175 MG/DL (ref 70–99)
GLUCOSE BLDC GLUCOMTR-MCNC: 184 MG/DL (ref 70–99)
GLUCOSE BLDC GLUCOMTR-MCNC: 186 MG/DL (ref 70–99)
GLUCOSE BLDC GLUCOMTR-MCNC: 198 MG/DL (ref 70–99)
GLUCOSE BLDC GLUCOMTR-MCNC: 85 MG/DL (ref 70–99)
HCO3 BLDV-SCNC: 20 MMOL/L (ref 21–28)
HCO3 BLDV-SCNC: 21 MMOL/L (ref 21–28)
HCO3 BLDV-SCNC: 22 MMOL/L (ref 21–28)
HCT VFR BLD AUTO: 34.2 % (ref 35–47)
HGB BLD-MCNC: 10.8 G/DL (ref 11.7–15.7)
HOLD SPECIMEN: NORMAL
HOLD SPECIMEN: NORMAL
MAGNESIUM SERPL-MCNC: 1.5 MG/DL (ref 1.6–2.3)
MAGNESIUM SERPL-MCNC: 2.1 MG/DL (ref 1.6–2.3)
MCH RBC QN AUTO: 28.5 PG (ref 26.5–33)
MCHC RBC AUTO-ENTMCNC: 31.6 G/DL (ref 31.5–36.5)
MCV RBC AUTO: 90 FL (ref 78–100)
O2/TOTAL GAS SETTING VFR VENT: 0 %
O2/TOTAL GAS SETTING VFR VENT: 21 %
O2/TOTAL GAS SETTING VFR VENT: 3 %
PCO2 BLDV: 33 MM HG (ref 40–50)
PCO2 BLDV: 34 MM HG (ref 40–50)
PCO2 BLDV: 41 MM HG (ref 40–50)
PH BLDV: 7.35 [PH] (ref 7.32–7.43)
PH BLDV: 7.39 [PH] (ref 7.32–7.43)
PH BLDV: 7.4 [PH] (ref 7.32–7.43)
PHOSPHATE SERPL-MCNC: 1.7 MG/DL (ref 2.5–4.5)
PHOSPHATE SERPL-MCNC: 2.9 MG/DL (ref 2.5–4.5)
PLATELET # BLD AUTO: 150 10E3/UL (ref 150–450)
PO2 BLDV: 49 MM HG (ref 25–47)
PO2 BLDV: 52 MM HG (ref 25–47)
PO2 BLDV: 61 MM HG (ref 25–47)
POTASSIUM BLD-SCNC: 3.1 MMOL/L (ref 3.4–5.3)
POTASSIUM BLD-SCNC: 3.3 MMOL/L (ref 3.4–5.3)
POTASSIUM BLD-SCNC: 3.5 MMOL/L (ref 3.4–5.3)
POTASSIUM BLD-SCNC: 3.6 MMOL/L (ref 3.4–5.3)
PROT SERPL-MCNC: 5 G/DL (ref 6.8–8.8)
RBC # BLD AUTO: 3.79 10E6/UL (ref 3.8–5.2)
SODIUM SERPL-SCNC: 141 MMOL/L (ref 133–144)
SODIUM SERPL-SCNC: 142 MMOL/L (ref 133–144)
VANCOMYCIN SERPL-MCNC: 13.8 MG/L
WBC # BLD AUTO: 5.8 10E3/UL (ref 4–11)

## 2022-05-05 PROCEDURE — 250N000013 HC RX MED GY IP 250 OP 250 PS 637: Performed by: INTERNAL MEDICINE

## 2022-05-05 PROCEDURE — 82040 ASSAY OF SERUM ALBUMIN: CPT | Performed by: INTERNAL MEDICINE

## 2022-05-05 PROCEDURE — 82803 BLOOD GASES ANY COMBINATION: CPT | Performed by: INTERNAL MEDICINE

## 2022-05-05 PROCEDURE — 84100 ASSAY OF PHOSPHORUS: CPT | Performed by: INTERNAL MEDICINE

## 2022-05-05 PROCEDURE — 84132 ASSAY OF SERUM POTASSIUM: CPT | Performed by: INTERNAL MEDICINE

## 2022-05-05 PROCEDURE — 250N000011 HC RX IP 250 OP 636: Performed by: INTERNAL MEDICINE

## 2022-05-05 PROCEDURE — 120N000003 HC R&B IMCU UMMC

## 2022-05-05 PROCEDURE — 83735 ASSAY OF MAGNESIUM: CPT | Performed by: INTERNAL MEDICINE

## 2022-05-05 PROCEDURE — 99232 SBSQ HOSP IP/OBS MODERATE 35: CPT | Performed by: INTERNAL MEDICINE

## 2022-05-05 PROCEDURE — 76770 US EXAM ABDO BACK WALL COMP: CPT | Mod: 26 | Performed by: RADIOLOGY

## 2022-05-05 PROCEDURE — 85027 COMPLETE CBC AUTOMATED: CPT | Performed by: INTERNAL MEDICINE

## 2022-05-05 PROCEDURE — 250N000009 HC RX 250: Performed by: INTERNAL MEDICINE

## 2022-05-05 PROCEDURE — 250N000013 HC RX MED GY IP 250 OP 250 PS 637: Performed by: STUDENT IN AN ORGANIZED HEALTH CARE EDUCATION/TRAINING PROGRAM

## 2022-05-05 PROCEDURE — 36415 COLL VENOUS BLD VENIPUNCTURE: CPT | Performed by: INTERNAL MEDICINE

## 2022-05-05 PROCEDURE — 258N000003 HC RX IP 258 OP 636: Performed by: INTERNAL MEDICINE

## 2022-05-05 PROCEDURE — 99233 SBSQ HOSP IP/OBS HIGH 50: CPT | Performed by: NURSE PRACTITIONER

## 2022-05-05 PROCEDURE — 80202 ASSAY OF VANCOMYCIN: CPT | Performed by: INTERNAL MEDICINE

## 2022-05-05 PROCEDURE — 250N000009 HC RX 250: Performed by: STUDENT IN AN ORGANIZED HEALTH CARE EDUCATION/TRAINING PROGRAM

## 2022-05-05 PROCEDURE — 76770 US EXAM ABDO BACK WALL COMP: CPT

## 2022-05-05 PROCEDURE — 250N000011 HC RX IP 250 OP 636: Performed by: STUDENT IN AN ORGANIZED HEALTH CARE EDUCATION/TRAINING PROGRAM

## 2022-05-05 PROCEDURE — 999N000065 XR ABDOMEN PORT 1 VIEWS

## 2022-05-05 PROCEDURE — 258N000001 HC RX 258: Performed by: INTERNAL MEDICINE

## 2022-05-05 PROCEDURE — 74018 RADEX ABDOMEN 1 VIEW: CPT | Mod: 26 | Performed by: RADIOLOGY

## 2022-05-05 PROCEDURE — 80053 COMPREHEN METABOLIC PANEL: CPT | Performed by: INTERNAL MEDICINE

## 2022-05-05 RX ORDER — LEVOTHYROXINE SODIUM 88 UG/1
88 TABLET ORAL DAILY
Status: DISCONTINUED | OUTPATIENT
Start: 2022-05-06 | End: 2022-05-14 | Stop reason: HOSPADM

## 2022-05-05 RX ORDER — POTASSIUM CHLORIDE 7.45 MG/ML
10 INJECTION INTRAVENOUS ONCE
Status: COMPLETED | OUTPATIENT
Start: 2022-05-05 | End: 2022-05-05

## 2022-05-05 RX ORDER — MAGNESIUM SULFATE HEPTAHYDRATE 40 MG/ML
2 INJECTION, SOLUTION INTRAVENOUS ONCE
Status: COMPLETED | OUTPATIENT
Start: 2022-05-05 | End: 2022-05-05

## 2022-05-05 RX ORDER — GUAIFENESIN 600 MG/1
15 TABLET, EXTENDED RELEASE ORAL DAILY
Status: DISCONTINUED | OUTPATIENT
Start: 2022-05-05 | End: 2022-05-14 | Stop reason: HOSPADM

## 2022-05-05 RX ORDER — UREA 10 %
1000 LOTION (ML) TOPICAL DAILY
Status: DISCONTINUED | OUTPATIENT
Start: 2022-05-06 | End: 2022-05-14 | Stop reason: HOSPADM

## 2022-05-05 RX ORDER — DULOXETIN HYDROCHLORIDE 20 MG/1
20 CAPSULE, DELAYED RELEASE ORAL DAILY
Status: DISCONTINUED | OUTPATIENT
Start: 2022-05-06 | End: 2022-05-14 | Stop reason: HOSPADM

## 2022-05-05 RX ORDER — ATORVASTATIN CALCIUM 40 MG/1
40 TABLET, FILM COATED ORAL DAILY
Status: DISCONTINUED | OUTPATIENT
Start: 2022-05-06 | End: 2022-05-14 | Stop reason: HOSPADM

## 2022-05-05 RX ORDER — POTASSIUM CHLORIDE 20MEQ/15ML
10 LIQUID (ML) ORAL ONCE
Status: COMPLETED | OUTPATIENT
Start: 2022-05-05 | End: 2022-05-05

## 2022-05-05 RX ORDER — ASPIRIN 81 MG/1
81 TABLET, CHEWABLE ORAL DAILY
Status: DISCONTINUED | OUTPATIENT
Start: 2022-05-06 | End: 2022-05-14 | Stop reason: HOSPADM

## 2022-05-05 RX ORDER — DOXAZOSIN 1 MG/1
1 TABLET ORAL AT BEDTIME
Status: DISCONTINUED | OUTPATIENT
Start: 2022-05-05 | End: 2022-05-14 | Stop reason: HOSPADM

## 2022-05-05 RX ORDER — POTASSIUM CHLORIDE 7.45 MG/ML
10 INJECTION INTRAVENOUS
Status: COMPLETED | OUTPATIENT
Start: 2022-05-05 | End: 2022-05-05

## 2022-05-05 RX ORDER — VITAMIN B COMPLEX
25 TABLET ORAL DAILY
Status: DISCONTINUED | OUTPATIENT
Start: 2022-05-06 | End: 2022-05-14 | Stop reason: HOSPADM

## 2022-05-05 RX ORDER — CEFTRIAXONE 1 G/1
1 INJECTION, POWDER, FOR SOLUTION INTRAMUSCULAR; INTRAVENOUS EVERY 24 HOURS
Status: COMPLETED | OUTPATIENT
Start: 2022-05-05 | End: 2022-05-09

## 2022-05-05 RX ORDER — DEXTROSE MONOHYDRATE 100 MG/ML
INJECTION, SOLUTION INTRAVENOUS CONTINUOUS PRN
Status: DISCONTINUED | OUTPATIENT
Start: 2022-05-05 | End: 2022-05-14 | Stop reason: HOSPADM

## 2022-05-05 RX ADMIN — CARVEDILOL 6.25 MG: 6.25 TABLET, FILM COATED ORAL at 17:27

## 2022-05-05 RX ADMIN — POTASSIUM CHLORIDE 10 MEQ: 7.46 INJECTION, SOLUTION INTRAVENOUS at 16:16

## 2022-05-05 RX ADMIN — PREGABALIN 75 MG: 75 CAPSULE ORAL at 20:26

## 2022-05-05 RX ADMIN — DOXAZOSIN 1 MG: 1 TABLET ORAL at 21:29

## 2022-05-05 RX ADMIN — DULOXETINE HYDROCHLORIDE 20 MG: 20 CAPSULE, DELAYED RELEASE ORAL at 08:33

## 2022-05-05 RX ADMIN — Medication 1000 MCG: at 07:59

## 2022-05-05 RX ADMIN — ASPIRIN 81 MG CHEWABLE TABLET 81 MG: 81 TABLET CHEWABLE at 08:35

## 2022-05-05 RX ADMIN — LEVETIRACETAM 500 MG: 5 INJECTION INTRAVENOUS at 08:39

## 2022-05-05 RX ADMIN — HEPARIN SODIUM 5000 UNITS: 5000 INJECTION, SOLUTION INTRAVENOUS; SUBCUTANEOUS at 07:59

## 2022-05-05 RX ADMIN — LEVETIRACETAM 500 MG: 5 INJECTION INTRAVENOUS at 20:26

## 2022-05-05 RX ADMIN — POTASSIUM CHLORIDE 10 MEQ: 20 SOLUTION ORAL at 23:16

## 2022-05-05 RX ADMIN — PIPERACILLIN AND TAZOBACTAM 3.38 G: 3; .375 INJECTION, POWDER, LYOPHILIZED, FOR SOLUTION INTRAVENOUS at 04:10

## 2022-05-05 RX ADMIN — Medication 25 MCG: at 08:34

## 2022-05-05 RX ADMIN — DEXTROSE MONOHYDRATE: 100 INJECTION, SOLUTION INTRAVENOUS at 13:46

## 2022-05-05 RX ADMIN — PREGABALIN 75 MG: 75 CAPSULE ORAL at 07:59

## 2022-05-05 RX ADMIN — HEPARIN SODIUM 5000 UNITS: 5000 INJECTION, SOLUTION INTRAVENOUS; SUBCUTANEOUS at 20:26

## 2022-05-05 RX ADMIN — LEVOTHYROXINE SODIUM 88 MCG: 88 TABLET ORAL at 08:46

## 2022-05-05 RX ADMIN — HUMAN INSULIN 2 UNITS/HR: 100 INJECTION, SOLUTION SUBCUTANEOUS at 09:00

## 2022-05-05 RX ADMIN — MAGNESIUM SULFATE 2 G: 2 INJECTION INTRAVENOUS at 12:54

## 2022-05-05 RX ADMIN — CARVEDILOL 6.25 MG: 6.25 TABLET, FILM COATED ORAL at 08:33

## 2022-05-05 RX ADMIN — POTASSIUM PHOSPHATE, MONOBASIC AND POTASSIUM PHOSPHATE, DIBASIC 9 MMOL: 224; 236 INJECTION, SOLUTION, CONCENTRATE INTRAVENOUS at 13:58

## 2022-05-05 RX ADMIN — ATORVASTATIN CALCIUM 40 MG: 40 TABLET, FILM COATED ORAL at 08:36

## 2022-05-05 RX ADMIN — Medication 15 ML: at 13:45

## 2022-05-05 RX ADMIN — NIFEDIPINE 60 MG: 60 TABLET, EXTENDED RELEASE ORAL at 08:34

## 2022-05-05 RX ADMIN — CEFTRIAXONE SODIUM 1 G: 1 INJECTION, POWDER, FOR SOLUTION INTRAMUSCULAR; INTRAVENOUS at 08:48

## 2022-05-05 RX ADMIN — POTASSIUM CHLORIDE 10 MEQ: 7.46 INJECTION, SOLUTION INTRAVENOUS at 11:32

## 2022-05-05 RX ADMIN — POTASSIUM CHLORIDE 10 MEQ: 7.46 INJECTION, SOLUTION INTRAVENOUS at 10:19

## 2022-05-05 RX ADMIN — THIAMINE HCL TAB 100 MG 100 MG: 100 TAB at 13:45

## 2022-05-05 ASSESSMENT — ACTIVITIES OF DAILY LIVING (ADL)
ADLS_ACUITY_SCORE: 21
ADLS_ACUITY_SCORE: 27
ADLS_ACUITY_SCORE: 25
ADLS_ACUITY_SCORE: 27
ADLS_ACUITY_SCORE: 23
ADLS_ACUITY_SCORE: 21
ADLS_ACUITY_SCORE: 27
ADLS_ACUITY_SCORE: 27
ADLS_ACUITY_SCORE: 21
ADLS_ACUITY_SCORE: 27
ADLS_ACUITY_SCORE: 21
ADLS_ACUITY_SCORE: 21
ADLS_ACUITY_SCORE: 27
ADLS_ACUITY_SCORE: 21
ADLS_ACUITY_SCORE: 27
ADLS_ACUITY_SCORE: 23
ADLS_ACUITY_SCORE: 21

## 2022-05-05 NOTE — PHARMACY-CONSULT NOTE
Pharmacy Tube Feeding Consult    Medication reviewed for administration by feeding tube and for potential food/drug interactions.    Recommendation: Recommend changing the following medications to an immediate release dosage form:   - Nifedipine if patient is unable to take medication orally as capsules can not be opened or crushed     Pharmacy will continue to follow as new medications are ordered.    Ian AyalaD, [facility]  Inpatient clinical pharmacist

## 2022-05-05 NOTE — PROGRESS NOTES
IP Diabetes Management  Daily Note           Assessment and Plan:   HPI: Isabell Matthews is a 61 year old female, with h/o  seizure disorder, type 2 diabetes treated as type 1, CKD3, HTN, HLD, CAD s/p PCI, hypothyroidism, vascular dementia, multiple prior ischemic strokes and recent acute ischemic stroke of right frontoparietal punctate stroke due to small-vessel occlusion (was hospitalized in 4/5/22), presenting with DKA in setting of infection and withheld insulin.         Assessment:   1)Type 2 Diabetes Mellitus treated as type 1, uncontrolled (A1c 8.9), presenting with DKA precipitated by lack of insulin and UTI  2) TF induced hyperglycemia    Plan:    -continue IV insulin gtt until TF at goal rate   -Novolog 1:15g CHO coverage with meals and snacks/supplements when eating   -BG monitoring Q1-2 hours   -continue D10 at 50 ml/hour until TF at 25 ml/hour   -hypoglycemia protocol   -carb counting protocol   -diabetes education needs will be assessed closer to discharge.     Outpatient follow up: TBD (PCP vs. [facility])  Plan discussed with patient, patient's daughter, bedside RN, and primary team.       Interval History and Assessment: interval glucose trend reviewed:         BG stable on variable insulin gtt rates.  Over past 8 hours averaging about 2.1 units/hour.  D10 at 50 ml/hour continues.  Per primary team, patient to receive NJ tube today and start TF.  Per RD note,  Will start osmolite 1.5 Antonio at 15 ml/hour and advance by 10ml/hour Q 8 hours pending pt's tolerance.  With goal of 55 ml/hour for 268g CHO total per day.     Daughter at bedside today.  Agreeable to TF, but tearful.  She states that the patient has not been eating well at all since April.  She had been reducing her Lantus doses accordingly, but held the evening Lantus dose on Monday night.  BG then scarlett and patient presented in DKA.  She reports that her mom started vomiting when BG scarlett.        Current nutritional intake and type:  Orders Placed This Encounter      NPO for Medical/Clinical Reasons Except for: No Exceptions  osmolite 1.5 Antonio at 15 ml/hour and advance by 10ml/hour Q 8 hours pending pt's tolerance.  With goal of 55 ml/hour for 268g CHO total per day.     Planned Procedures/surgeries: NJ tube placement today  Steroid planning: none  D5W-containing solutions/medications: D10 at 50 ml/hour, will start TF later today    PTA Diabetes Regimen:   Lantus 14-20 units at bedtime (per daughter's discretion)  humalog 1-3 units with meals    Discharge Planning: TBD           Diabetes History:   Type of Diabetes: Type 1 Diabetes Mellitus, TPN/Enteral Feeding Induced Hyperglycemia  Lab Results   Component Value Date    A1C 8.9 05/03/2022    A1C 8.8 04/05/2022    A1C 8.2 08/02/2021    A1C 7.8 06/21/2021    A1C 11.7 09/27/2020              Review of Systems:     The Review of Systems is negative other than noted in the Interval History.           Medications:     Current Facility-Administered Medications   Medication     aspirin (ASA) chewable tablet 81 mg     atorvastatin (LIPITOR) tablet 40 mg     carvedilol (COREG) tablet 6.25 mg     cefTRIAXone (ROCEPHIN) 1 g vial to attach to  mL bag for ADULTS or NS 50 mL bag for PEDS     cyanocobalamin (VITAMIN B-12) tablet 1,000 mcg     dextrose 10% infusion     dextrose 10% infusion     dextrose 50 % injection 25-50 mL     glucose gel 15-30 g    Or     dextrose 50 % injection 25-50 mL    Or     glucagon injection 1 mg     doxazosin (CARDURA) tablet 1 mg     DULoxetine (CYMBALTA) DR capsule 20 mg     heparin ANTICOAGULANT injection 5,000 Units     hydrALAZINE (APRESOLINE) injection 10 mg     insulin 1 unit/mL in saline (NovoLIN, HumuLIN Regular) drip - ADULT IV Infusion     insulin aspart (NovoLOG) injection (RAPID ACTING)     insulin aspart (NovoLOG) injection (RAPID ACTING)     levETIRAcetam (KEPPRA) intermittent infusion 500 mg     [Held by provider] levETIRAcetam (KEPPRA) tablet 500 mg      lidocaine (LMX4) cream     lidocaine 1 % 0.1-1 mL     melatonin tablet 1 mg     NIFEdipine ER (ADALAT CC) 24 hr tablet 60 mg     ondansetron (ZOFRAN-ODT) ODT tab 4 mg    Or     ondansetron (ZOFRAN) injection 4 mg     pregabalin (LYRICA) capsule 75 mg     senna-docusate (SENOKOT-S/PERICOLACE) 8.6-50 MG per tablet 1 tablet    Or     senna-docusate (SENOKOT-S/PERICOLACE) 8.6-50 MG per tablet 2 tablet     sodium chloride (PF) 0.9% PF flush 3 mL     sodium chloride (PF) 0.9% PF flush 3 mL     sodium chloride (PF) 0.9% PF flush 3 mL     sodium chloride (PF) 0.9% PF flush 3 mL     SYNTHROID **BRAND NAME ONLY** tablet 88 mcg     Vitamin D3 (CHOLECALCIFEROL) tablet 25 mcg            Physical Exam:    /73 (BP Location: Right arm, Patient Position: Semi-Cantu's, Cuff Size: Adult Regular)   Pulse 105   Temp 99.9  F (37.7  C) (Oral)   Resp 16   Wt 59.5 kg (131 lb 2.8 oz)   SpO2 96%   BMI 23.24 kg/m    General: resting in bed, not talking, will open eyes to voice  HEENT: normocephalic, atraumatic. Oral mucous membranes dry.  Lungs: unlabored respiration, no cough  ABD: rounded, nondistended  Skin: warm and dry, no obvious lesions  MSK:  moves all extremities  Mental status:  arouses to voice, sleepy.  Unable to assess mentation.  Psych:  sleepy.            Data:     Recent Labs   Lab 05/05/22  0857 05/05/22  0837 05/05/22  0756 05/05/22  0658 05/05/22  0610 05/05/22  0507   * 128* 151* 184* 198* 175*     Lab Results   Component Value Date    WBC 5.8 05/05/2022    WBC 8.1 05/04/2022    WBC 8.3 05/03/2022    HGB 10.8 (L) 05/05/2022    HGB 11.2 (L) 05/04/2022    HGB 10.6 (L) 05/03/2022    HCT 34.2 (L) 05/05/2022    HCT 35.3 05/04/2022    HCT 34.2 (L) 05/03/2022    MCV 90 05/05/2022    MCV 90 05/04/2022    MCV 91 05/03/2022     05/05/2022     05/04/2022     05/03/2022     Lab Results   Component Value Date     05/05/2022     05/04/2022     05/04/2022    POTASSIUM 3.1 (L)  05/05/2022    POTASSIUM 3.6 05/04/2022    POTASSIUM 2.8 (L) 05/04/2022    CHLORIDE 113 (H) 05/05/2022    CHLORIDE 113 (H) 05/04/2022    CHLORIDE 112 (H) 05/04/2022    CO2 19 (L) 05/05/2022    CO2 24 05/04/2022    CO2 24 05/04/2022     (H) 05/05/2022     (H) 05/05/2022     (H) 05/05/2022     Lab Results   Component Value Date    BUN 13 05/05/2022    BUN 13 05/04/2022    BUN 17 05/04/2022     Lab Results   Component Value Date    TSH 0.63 08/02/2021    TSH 0.65 09/27/2020     Lab Results   Component Value Date    AST 27 05/05/2022    AST 41 05/04/2022    AST 28 05/03/2022    ALT 16 05/05/2022    ALT 15 05/04/2022    ALT 23 05/03/2022    ALKPHOS 129 05/05/2022    ALKPHOS 132 05/04/2022    ALKPHOS 221 (H) 05/03/2022         45 minutes spent on the date of the encounter doing chart review, history and exam, documentation and further activities per the note      Over 50% of my time on the unit was spent counseling the patient and/or coordinating care regarding acute hyperglycemia management.  See note for details.    To contact Endocrine Diabetes service:   From 8AM-4PM: page inpatient diabetes provider that is following the patient  For questions or updates from 4PM-8AM: page the diabetes job code for on call fellow: [phone]    PEGGY Palafox, CNP  Inpatient Diabetes Management Service  Pager [phone]

## 2022-05-05 NOTE — PROGRESS NOTES
[facility]    Stroke Progress Note    Interval EventsNo acute events overnight. Mentation slightly improved - was able to hold a meaningful conversation with daughter over the phone per nursing. Did not speak in full sentences with our team but did speak in 1-2 word phrases     HPI Summary  Ms. Isabell Matthews is a 61 year old woman with a past medical history of hypertension, hyperlipidemia, CAD status post PCI, CKD, diabetes mellitus type 2, history of DKA, hypothyroidism, multiple prior ischemic strokes, history of ICH, recent right frontoparietal stroke in April 2022, seizure disorder (on Keppra 500 twice daily), reportedly poor cognitive baseline 2/2 vascular dementia. She presented to the [facility] emergency department on 5/3 for depressed mental status and hyperglycemia.  Stroke code was called. Last normal 9PM on 5/2. Due to altered mental status, history primarily obtained from daughter, Vishal. Vishal noted that she set her mom up in her bedroom and she appeared to be in her typical state of health aside from some increased fatigue and then took a nap.  Upon awakening and checking on her mom around 1130 or midnight Ms. Matthews was noted to have more incontinence than usual, was very lethargic, was not responding verbally.  Vishal checked her blood sugar and noted that it was high  (although Malik had recently lowered her mom's insulin due to fear of hypoglycemia in setting of low appetite for the past couple of days). No focal deficits or new weakness reported per daughter.  No witnessed seizure-like events similar to her previous seizures (unilateral motor seizures).    Impression   Ms. Matthews is a 61-year-old woman with a history of multiple lacunar and punctate ischemic infarcts as well as multifocal moderate to severe stenosis in multiple vascular territories who presents with altered mental status in the setting of DKA and lactic acidosis.  Of note her  caretaker has been ill with respiratory illness recently.  Additionally she did not receive a typical doses of insulin evening 5/1 and daytime 5/2 due to decreased appetite and concern for precipitate hypoglycemia per daughter.     CT and CTA head imaging did not demonstrate clear new occlusive lesion or developing area of hypodensity concerning for stroke as primary etiology. MRI without evidence of acute ischemic stroke. Video EEG without evidence of seizures (obtained by primary team due to possible L gaze deviation). At this time most likely etiology for encephalopathy is metabolic in setting of DKA and UTI.     Plan  -Discontinue EEG   -Continue aggressive treatment of DKA (resolving) and UTI  -Neurology will sign off     Patient Follow-up    -No follow up with neurology required     No further stroke evaluation is recommended, so we will sign off. Please contact us with any additional questions.    Eveline Sanabria MD  PGY-1 Neurology Resident  ______________________________________________________    Clinically Significant Risk Factors Present on Admission            # Moderate Malnutrition: based on nutrition assessment      Medications   Scheduled Meds    aspirin  81 mg Oral Daily     atorvastatin  40 mg Oral Daily     carvedilol  6.25 mg Oral or Feeding Tube BID w/meals     cyanocobalamin  1,000 mcg Oral Daily     doxazosin  1 mg Oral At Bedtime     DULoxetine  20 mg Oral Daily     heparin ANTICOAGULANT  5,000 Units Subcutaneous BID     insulin aspart   Subcutaneous TID AC     levETIRAcetam  500 mg Intravenous BID     [Held by provider] levETIRAcetam  500 mg Oral BID     NIFEdipine ER  60 mg Oral Daily     piperacillin-tazobactam  3.375 g Intravenous Q6H     pregabalin  75 mg Oral BID     sodium chloride (PF)  3 mL Intracatheter Q8H     sodium chloride (PF)  3 mL Intracatheter Q8H     levothyroxine  88 mcg Oral Daily     vancomycin  1,000 mg Intravenous Q24H     Vitamin D3  25 mcg Oral Daily       Infusion  Meds    dextrose 10% 50 mL/hr at 05/04/22 2000     dextrose Stopped (05/04/22 1703)     insulin regular 1 Units/hr (05/04/22 2111)       PRN Meds  dextrose, dextrose, glucose **OR** dextrose **OR** glucagon, hydrALAZINE, insulin aspart, lidocaine 4%, lidocaine (buffered or not buffered), melatonin, ondansetron **OR** ondansetron, senna-docusate **OR** senna-docusate, sodium chloride (PF), sodium chloride (PF)       PHYSICAL EXAMINATION  Temp:  [97.5  F (36.4  C)-98.3  F (36.8  C)] 98.2  F (36.8  C)  Pulse:  [79-88] 81  Resp:  [14-18] 18  BP: (131-160)/(61-88) 131/79  SpO2:  [96 %-98 %] 97 %      Exam  Constitutional: somnolent but awakens to voice, oriented to self but not location or time. No acute distress.   Head: Atraumatic, normocephalic.  ENT: No sinus drainage.  Cardiovascular: regular rate   Respiratory: No increased work of breathing, no accessory muscle use.   Gastrointestinal: Does not appear distended.  Skin: No rashes or lesions appreciated on visualized skin.   Hematologic/Lymphatic/Immunologic: No bruising appreciated on visualized skin.      Neurologic:   Mental status: somnolent but awakens to voice, does not follow commands. Speech comprehension intact. Not dysarthric   Cranial nerves: Eyes conjugate, PERRL,tracks to voice, no fixed gaze (eyes cross midline), face with mild left facial droop  Motor:  Moving all extremities in response to noxious stimuli, and with some antigravity, otherwise movement is limited. Spasticity noted in all four ext RLE>LLE. No abnormal movements noted (no myoclonus).   Reflexes: no ankle clonus. Toes up-going.  Sensory: unable to assess due to somnolence  Coordination/gait: unable to perform - mental status    Imaging  I personally reviewed all imaging; relevant findings per HPI.     Lab Results Data   CBC  Recent Labs   Lab 05/04/22  1000 05/03/22  0857 05/03/22  0235 05/03/22  0227   WBC 8.1 8.3  --  8.3   RBC 3.92 3.78*  --  4.54   HGB 11.2* 10.6* 13.9 12.7   HCT  35.3 34.2* 41 43.2    209  --  239     Basic Metabolic Panel    Recent Labs   Lab 05/04/22 2106 05/04/22 2008 05/04/22  1915 05/04/22  1209 05/04/22  1141 05/04/22  1006 05/04/22  1000 05/04/22  0804 05/04/22  0739 05/03/22  1602 05/03/22  1542   NA  --   --   --   --  144  --  144  --   --   --  148*   POTASSIUM  --   --   --   --  2.8*  --  2.8*  --   --   --  3.4   CHLORIDE  --   --   --   --  112*  --  115*  --   --   --  119*   CO2  --   --   --   --  24  --  23  --   --   --  21   BUN  --   --   --   --  17  --  19  --   --   --  35*   CR  --   --   --   --  1.31*  --  1.29*  --  1.29*  --  1.48*   * 98 129*   < > 134*   < > 101*   < >  --    < > 106*   VERONICA  --   --   --   --  8.2*  --  8.6  --   --   --  8.8    < > = values in this interval not displayed.     Liver Panel  Recent Labs   Lab 05/04/22 1000 05/03/22 0227   PROTTOTAL 5.4* 7.7   ALBUMIN 2.4* 3.4   BILITOTAL 0.3 0.7   ALKPHOS 132 221*   AST 41 28   ALT 15 23     INR    Recent Labs   Lab Test 05/03/22  0229 04/10/22  1441 04/10/22  1439   INR 1.3* 0.93 1.1*      Lipid Profile    Recent Labs   Lab Test 04/06/22  0014 08/02/21  1009   CHOL 99 146   HDL 34* 39*   LDL 39 75   TRIG 129 158*     A1C    Recent Labs   Lab Test 05/03/22 0227 04/05/22 2115 08/02/21  1009   A1C 8.9* 8.8* 8.2*     Troponin I  No results for input(s): TROPONINIS, TROPONINI, GHTROP in the last 168 hours.

## 2022-05-05 NOTE — PROGRESS NOTES
CLINICAL NUTRITION SERVICES - BRIEF NOTE  For last full RD assessment, see note dated 5/4.      Nutrition Prescription     RECOMMENDATIONS FOR MDs/PROVIDERS TO ORDER:  Monitor K/Mg/Phos levels and order replacement protocols as needed pending trends w/ start of EN      Malnutrition Status:    Severe malnutrition in the context of chronic illness     Recommendations already ordered by Registered Dietitian (RD):  Enteral nutrition support to initiate, pending AXR FT confirmation (presumed gastric per RN report)  Use dosing weight 60 kg  Formula: Osmolite 1.5 Antonio @ goal of  55ml/hr  (1320ml/day)  will provide: 1980 kcals (33 kcal/kg), 83 g PRO (1.4 g/kg), 1005 ml free H20, 268 g CHO, and 0 g fiber daily.   - Ordered add on Mg and Phos per no recent values taken  Do not start or advance TF rate unless Mg++ >1.5, K+ is >3, and phos >2    - Initiate @ 15 ml/hr and advance by 10 ml q8hr pending pt's tolerance.  - Recommend 60 ml q4hr fluid flushes for tube patency.   -->Flushes + free water from TF = 1365 mL free water daily   --> Additional fluids and/or adjustments per MD.     - Order multivitamin/mineral (15 ml/day via FT) to help ensure micronutrient needs being met with suspected hypermetabolic demands and potential interruptions to TF infusions.   - Consider additional 100 mg Thiamine x 5-7 days to prevent lyte depletion per potential risk for refeeding syndrome   - HOB >30-45 degrees with gastric EN access              Future/Additional Recommendations:  Monitor lyes K/Mg/Phos., glucose trends, stooling trends, EN Tolerance/progression       NEW FINDINGS   -RD received consult for start of EN. Pt remains NPO.   -NGT placed by bedside RN, awaiting XR confirmation   -Reviewed available labs, K+ low but acceptable range for start of TFs (monitor for depletion as EN initiates); no recent Mg/Phos so ordering add-on, check for result prior to initiating TFs.   -Reviewed meds, endo team following for glycemic control.  Insulin drip.     ADDENDUM: 1317  Mg and Phos levels both low; needs replacement before able to initiate TFs.   Ordered additional Mg level to be checked after replacement per RN request  Phos still to be replaced, infusion takes 6 hours so TF will likely not be started until this evening at earliest  Paged endocrine team to update; D10 orders still in place, see endo note for detail.     INTERVENTIONS  Implementation  Collaboration with other providers - Endo, bedside RN  Enteral Nutrition - Initiate   Feeding tube flush - Initiate  Vitamins/minerals as above    Monitoring/Evaluation  Will continue to monitor and evaluate per protocol.    Aly Montoya, AKSHATN, LD, CNSC  [address]B RD pager: [phone]   [address] RD Phone: *[phone]

## 2022-05-05 NOTE — PHARMACY-VANCOMYCIN DOSING SERVICE
Pharmacy Vancomycin Note  Date of Service May 5, 2022  Patient's  1960   61 year old, female    Indication: Sepsis, UTI  Day of Therapy: 3  Current vancomycin regimen: 1000 mg IV q24h  Current vancomycin monitoring method: AUC  Current vancomycin therapeutic monitoring goal: 400-600 mg*h/L    Insight[employer] Prediction of Current Vancomycin Regimen  Regimen: 1000 mg IV every 24 hours.  Start time: 09:46 on 2022  Exposure target: AUC24 (range)400-600 mg/L.hr   AUC24,ss: 600 mg/L.hr  Probability of AUC24 > 400: 99 %  Ctrough,ss: 18.8 mg/L  Probability of Ctrough,ss > 20: 39 %  Probability of nephrotoxicity (Lodise GABRIELA ): 15 %    Current estimated CrCl = Estimated Creatinine Clearance: 38.9 mL/min (A) (based on SCr of 1.39 mg/dL (H)).    Creatinine for last 3 days  5/3/2022:  2:27 AM Creatinine 1.84 mg/dL;  6:19 AM Creatinine 1.61 mg/dL;  8:57 AM Creatinine 1.49 mg/dL;  9:36 AM Creatinine 1.42 mg/dL; 11:33 AM Creatinine 1.45 mg/dL;  3:42 PM Creatinine 1.48 mg/dL;  2:28 AM Creatinine POCT 2.0 mg/dL  2022:  7:39 AM Creatinine 1.29 mg/dL; 10:00 AM Creatinine 1.29 mg/dL; 11:41 AM Creatinine 1.31 mg/dL;  9:37 PM Creatinine 1.24 mg/dL  2022:  8:37 AM Creatinine 1.39 mg/dL    Recent Vancomycin Levels (past 3 days)  2022:  7:39 AM Vancomycin 10.5 mg/L  2022:  8:37 AM Vancomycin 13.8 mg/L    Vancomycin IV Administrations (past 72 hours)                   vancomycin (VANCOCIN) 1000 mg in dextrose 5% 200 mL PREMIX (mg) 1,000 mg New Bag 22 0943    vancomycin 1250 mg in 0.9% NaCl 250 mL intermittent infusion 1,250 mg (mg) 1,250 mg New Bag 22 5741                Nephrotoxins and other renal medications (From now, onward)    None             Contrast Orders - past 72 hours (72h ago, onward)    Start     Dose/Rate Route Frequency Stop    22 1830  gadobutrol (GADAVIST) injection 7.5 mL         7.5 mL Intravenous ONCE 22 1812    22 0255  iopamidol (ISOVUE-370) solution 75 mL          75 mL Intravenous ONCE 05/03/22 0252        Interpretation of levels and current regimen:  Vancomycin level is reflective of -600     Plan:  1. Vancomycin discontinued per primary team. May need to back off slightly if therapy were resumed pending renal function assessment.    Keisha Arambula, [facility]

## 2022-05-05 NOTE — PROGRESS NOTES
[facility]    Medicine Progress Note - Hospitalist Service, GOLD TEAM 10    Date of Admission:  5/3/2022    Assessment & Plan        Isabell Matthews is a 61 year old female, with h/o acute ischemic stroke of right frontopperital punctate stroke due to small-vessel occlusion (was hospitalized in 4/5/22), multiple lacunar and punctate ischemic infarcts and multifocal moderate to severe stenosis in multiple vascular territories, seizure disorder, DMII, CKD3, HTN, HLD, CAD s/p PCI, hypothyroidism, vascular dementia, presenting with AMS/aphasia, found to have hyperglycemia 2/2 DKA and elevated LA.    Changes today   NG tube placement   Start tube feeds  Transition to ceftriaxone for aerococcus   After starting feeds and at 25 ml/hr start weaning d10.  Eval for transition of insulin gtt in AM if tolerating tube feeds        # Hyperglycemia 2/2 DKA  # UTI  # H/o DMII (No DEMETRIA ab/C-peptide)  Her glucose was 686, ketones 5.4, hgba1c 8.9. Had DKA in 10/2020. Per Endo, suspect insulin dependent DMII.     Etiology: may be due to UTI: UA showing bacteria and WB 15 with small LE. Per neuro note, it states that daughter/care taker has been dealing with respirator illness recently. It may also be due to skipped insulin evening 5/1, daytime 5/2 due to decreased appetite and concern for precipitating hypoglycemia per caretaker/daughter.  - s/p Zosyn and Vanc in ED, continued on medicine floor, may continue per primary team, ucx pending  - holding pta glargine 18 U at bedtime, pta lispro mealtime SSI for now  - pta pregabalin 75 mg bid (held)  - DKA proctocol ordered  - Endocrine consulted  - BMP, phos q2h ordered  - phos, BMP, Mg, CBC, abg ordered  - Bcx x1 pending from ED  - EKG ordered  - negative  covid, influenza, respiratory panel     # AGMA with uncompensated respiratory acidosis  # Hyperkalemia  # Elevated lactate  Resolved      AGMA due to lactate, ketoacidosis 2/2 DKA. Will improve  with fluid resuscitation and insulin. Hyperkalemia 2/2 DKA/AGMA.    # Acute on Chronic kidney disease  Cr 1.84 on admission. Cr baseline 1.2-1.45. 2/2 volume depletion 2/2 DKA.       # Acute metabolic toxic encephalopathy  # Aphasia  # Concern for acute stroke  Of note in 4/2022, pt presented with 1 day of difficulty waking and 45 mins of L eye vision loss.  IV thrombolysis was not given  due to unclear or unfavorable risk-benefit profile for extended window thrombolysis beyond the conventional 4.5 hour time window. Etiology was thought to be small vessel disease.  Was recommended asa 81 mg daily with hgba1c goal <7.     Upon admission to the ED, there was concern for aphagia and stroke code was called, CT head showed no acute intracranial hemorrhages, showed small chronic infarcts. CTA head showed no intracranial arterial large vessel occlusion, and CT neck showed left carotid bifurcation moderate stenosis, atherosclerotic plaque results in less than 50% stenosis in the right carotid bulb and proximal cervical ICA, chronic right vertebral artery and proximal V1 with severe stenosis, but overall no significant interval change compared to CTA head and neck 04/10/2022. Not given TPA due to history of intracranial hemorrhage.      Etiology ddx: During stroke code, CT and CTA head imaging did not demonstrate clear new occlusive lesion or developing area of hypodensity concerning for stroke as primary etiology. Most likely toxic metabolic from DKA vs. Infection (respiratory vs UTI).  - pta atorvastatin 40 mg daily  - pta asa 81 mg daily  - q4h neuro checks  - consider MRI brain per primary team  - pending influenza, covid, respiratory panel     --Chronic diseases--  **Will place essential meds to IV instead of PTA PO since pt has AMS and is not able to do PO, not following commands**     #Seizure disorder  - PTA Keppra 500 mg BID (IV)     #CKD3  - BMP, phos q2h    #HTN  On admission /77.  - PTA coreg 6.25 mg, and  nifedipine 60 mg daily (instead of this placed PRN hydralzine 10 mg q6h)     #HLD  - PTA atrova 40 mg daily     #CAD s/p PCI  - PTA coreg and nifedipine 60 mg daily (instead of this placed PRN hydralzine 10 mg q6h, fpr SBP >170)     #Hypothyroidism  - PTA synthroid 88 mcg daily     #Vascular dementia  - Delirium precautions     # MDD  - pta duloxetine 20 mg daily     # Urinary retention  - pta doxazosin          Diet: NPO for Medical/Clinical Reasons Except for: No Exceptions  Adult Formula Drip Feeding: Continuous Osmolite 1.5; Nasogastric tube; Goal Rate: 55; mL/hr; Medication - Feeding Tube Flush Frequency: At least 15-30 mL water before and after medication administration and with tube clogging; Amount to Send (Nutr...    DVT Prophylaxis: Heparin SQ  Parker Catheter: Not present  Central Lines: None  Cardiac Monitoring: None  Code Status: Full Code      Disposition Plan   Expected Discharge:    Anticipated discharge location:  Awaiting care coordination huddle  Delays:            The patient's care was discussed with the Patient.    Paul Kulkarni MD  Hospitalist Service, [address]  Securely message with the Vocera Web Console (learn more here)  Text page via [employer] Paging/Directory   Please see signed in provider for up to date coverage information      Clinically Significant Risk Factors Present on Admission             # Moderate Malnutrition: based on nutrition assessment     ______________________________________________________________________    Interval History   Not responding verbally to command  Occasional yes no or states name   Moving purposefully     Data reviewed today: I reviewed all medications, new labs and imaging results over the last 24 hours. I personally reviewed no images or EKG's today.    Physical Exam   Vital Signs: Temp: 98  F (36.7  C) Temp src: Axillary BP: 112/67 Pulse: 81   Resp: 16 SpO2: 98 % O2 Device: Oxymask Oxygen  Delivery: 3 LPM  Weight: 131 lbs 2.78 oz  Physical Exam  Constitutional:       Appearance: She is not ill-appearing or toxic-appearing.   HENT:      Head: Normocephalic.      Mouth/Throat:      Mouth: Mucous membranes are moist.   Cardiovascular:      Rate and Rhythm: Normal rate and regular rhythm.      Heart sounds: No murmur heard.    No friction rub. No gallop.   Pulmonary:      Effort: Pulmonary effort is normal. No respiratory distress.      Breath sounds: No stridor. No wheezing.   Abdominal:      General: Abdomen is flat. There is no distension.      Palpations: There is no mass.      Tenderness: There is no abdominal tenderness.   Musculoskeletal:      Right lower leg: No edema.      Left lower leg: No edema.   Neurological:      General: No focal deficit present.      Mental Status: She is oriented to person, place, and time.      Cranial Nerves: No cranial nerve deficit.      Sensory: No sensory deficit.      Motor: No weakness.      Comments: Upper ext appears spastic    Psychiatric:         Mood and Affect: Mood normal.         Behavior: Behavior normal.         Data   Recent Labs   Lab 05/05/22  1506 05/05/22  1419 05/05/22  1409 05/05/22  1301 05/05/22  1204 05/05/22  1116 05/05/22  0857 05/05/22  0837 05/04/22  2220 05/04/22  2137 05/04/22  1006 05/04/22  1000 05/03/22  0936 05/03/22  0857 05/03/22  0235 05/03/22  0229   WBC  --   --   --   --   --   --   --  5.8  --   --   --  8.1  --  8.3  --   --    HGB  --   --   --   --   --   --   --  10.8*  --   --   --  11.2*  --  10.6*   < >  --    MCV  --   --   --   --   --   --   --  90  --   --   --  90  --  91  --   --    PLT  --   --   --   --   --   --   --  150  --   --   --  161  --  209  --   --    INR  --   --   --   --   --   --   --   --   --   --   --   --   --   --   --  1.3*   NA  --   --   --   --   --  141  --  142  --  144   < > 144   < > 144   < >  --    POTASSIUM  --  3.6  --   --   --  3.3*  --  3.1*  --  3.6   < > 2.8*   < > 3.6   < >   --    CHLORIDE  --   --   --   --   --  112*  --  113*  --  113*   < > 115*   < > 116*   < >  --    CO2  --   --   --   --   --  22  --  19*  --  24   < > 23   < > 18*   < >  --    BUN  --   --   --   --   --  12  --  13  --  13   < > 19   < > 38*   < >  --    CR  --   --   --   --   --  1.46*  --  1.39*  --  1.24*   < > 1.29*   < > 1.49*   < >  --    ANIONGAP  --   --   --   --   --  7  --  10  --  7   < > 6   < > 10   < >  --    VERONICA  --   --   --   --   --  8.1*  --  8.1*  --  8.6   < > 8.6   < > 8.3*   < >  --    *  --  137* 140*   < > 126*   < > 128*   < > 152*   < > 101*   < > 227*  223*   < >  --    ALBUMIN  --   --   --   --   --   --   --  2.3*  --   --   --  2.4*  --   --   --   --    PROTTOTAL  --   --   --   --   --   --   --  5.0*  --   --   --  5.4*  --   --   --   --    BILITOTAL  --   --   --   --   --   --   --  0.4  --   --   --  0.3  --   --   --   --    ALKPHOS  --   --   --   --   --   --   --  129  --   --   --  132  --   --   --   --    ALT  --   --   --   --   --   --   --  16  --   --   --  15  --   --   --   --    AST  --   --   --   --   --   --   --  27  --   --   --  41  --   --   --   --     < > = values in this interval not displayed.

## 2022-05-05 NOTE — PROGRESS NOTES
Major Shift Events: Patient oriented to person and place overnight. Following commands. Still remains confused about her circumstances and date. Had a loose bowel movement that was mixed with copious amounts of urine in her brief at shift change with subsequent blockage of purewick.  Patient starting to swallow but still needs heavy coaching and cues. Remains on room air, sinus rhythm in the 80-90s.  No seizure activity witnessed. VBG's WNL.    Plan: Continue insulin/D10 drip as needed for glucose control. Start subcutaneous insulin when patient eats.    For vital signs and complete assessments, please see documentation flowsheets.

## 2022-05-06 ENCOUNTER — APPOINTMENT (OUTPATIENT)
Dept: SPEECH THERAPY | Facility: CLINIC | Age: 62
DRG: 637 | End: 2022-05-06
Attending: INTERNAL MEDICINE
Payer: MEDICARE

## 2022-05-06 ENCOUNTER — APPOINTMENT (OUTPATIENT)
Dept: GENERAL RADIOLOGY | Facility: CLINIC | Age: 62
DRG: 637 | End: 2022-05-06
Attending: INTERNAL MEDICINE
Payer: MEDICARE

## 2022-05-06 LAB
ALBUMIN SERPL-MCNC: 2.5 G/DL (ref 3.4–5)
ALP SERPL-CCNC: 161 U/L (ref 40–150)
ALT SERPL W P-5'-P-CCNC: 19 U/L (ref 0–50)
ANION GAP SERPL CALCULATED.3IONS-SCNC: 7 MMOL/L (ref 3–14)
AST SERPL W P-5'-P-CCNC: 34 U/L (ref 0–45)
BILIRUB SERPL-MCNC: 0.3 MG/DL (ref 0.2–1.3)
BUN SERPL-MCNC: 8 MG/DL (ref 7–30)
CALCIUM SERPL-MCNC: 8.6 MG/DL (ref 8.5–10.1)
CHLORIDE BLD-SCNC: 114 MMOL/L (ref 94–109)
CO2 SERPL-SCNC: 21 MMOL/L (ref 20–32)
CREAT SERPL-MCNC: 1.18 MG/DL (ref 0.52–1.04)
ERYTHROCYTE [DISTWIDTH] IN BLOOD BY AUTOMATED COUNT: 14.3 % (ref 10–15)
GFR SERPL CREATININE-BSD FRML MDRD: 52 ML/MIN/1.73M2
GLUCOSE BLD-MCNC: 82 MG/DL (ref 70–99)
GLUCOSE BLDC GLUCOMTR-MCNC: 104 MG/DL (ref 70–99)
GLUCOSE BLDC GLUCOMTR-MCNC: 117 MG/DL (ref 70–99)
GLUCOSE BLDC GLUCOMTR-MCNC: 120 MG/DL (ref 70–99)
GLUCOSE BLDC GLUCOMTR-MCNC: 131 MG/DL (ref 70–99)
GLUCOSE BLDC GLUCOMTR-MCNC: 144 MG/DL (ref 70–99)
GLUCOSE BLDC GLUCOMTR-MCNC: 157 MG/DL (ref 70–99)
GLUCOSE BLDC GLUCOMTR-MCNC: 162 MG/DL (ref 70–99)
GLUCOSE BLDC GLUCOMTR-MCNC: 164 MG/DL (ref 70–99)
GLUCOSE BLDC GLUCOMTR-MCNC: 166 MG/DL (ref 70–99)
GLUCOSE BLDC GLUCOMTR-MCNC: 174 MG/DL (ref 70–99)
GLUCOSE BLDC GLUCOMTR-MCNC: 176 MG/DL (ref 70–99)
GLUCOSE BLDC GLUCOMTR-MCNC: 178 MG/DL (ref 70–99)
GLUCOSE BLDC GLUCOMTR-MCNC: 182 MG/DL (ref 70–99)
GLUCOSE BLDC GLUCOMTR-MCNC: 192 MG/DL (ref 70–99)
GLUCOSE BLDC GLUCOMTR-MCNC: 193 MG/DL (ref 70–99)
GLUCOSE BLDC GLUCOMTR-MCNC: 193 MG/DL (ref 70–99)
GLUCOSE BLDC GLUCOMTR-MCNC: 206 MG/DL (ref 70–99)
GLUCOSE BLDC GLUCOMTR-MCNC: 231 MG/DL (ref 70–99)
GLUCOSE BLDC GLUCOMTR-MCNC: 44 MG/DL (ref 70–99)
GLUCOSE BLDC GLUCOMTR-MCNC: 69 MG/DL (ref 70–99)
GLUCOSE BLDC GLUCOMTR-MCNC: 86 MG/DL (ref 70–99)
GLUCOSE BLDC GLUCOMTR-MCNC: 86 MG/DL (ref 70–99)
HCT VFR BLD AUTO: 36.3 % (ref 35–47)
HGB BLD-MCNC: 11.5 G/DL (ref 11.7–15.7)
MAGNESIUM SERPL-MCNC: 1.9 MG/DL (ref 1.6–2.3)
MCH RBC QN AUTO: 28.5 PG (ref 26.5–33)
MCHC RBC AUTO-ENTMCNC: 31.7 G/DL (ref 31.5–36.5)
MCV RBC AUTO: 90 FL (ref 78–100)
PHOSPHATE SERPL-MCNC: 2.5 MG/DL (ref 2.5–4.5)
PLATELET # BLD AUTO: 121 10E3/UL (ref 150–450)
POTASSIUM BLD-SCNC: 3.6 MMOL/L (ref 3.4–5.3)
PROT SERPL-MCNC: 5.9 G/DL (ref 6.8–8.8)
RBC # BLD AUTO: 4.04 10E6/UL (ref 3.8–5.2)
SODIUM SERPL-SCNC: 142 MMOL/L (ref 133–144)
WBC # BLD AUTO: 4.8 10E3/UL (ref 4–11)

## 2022-05-06 PROCEDURE — 999N000065 XR ABDOMEN PORT 1 VIEWS

## 2022-05-06 PROCEDURE — 36415 COLL VENOUS BLD VENIPUNCTURE: CPT | Performed by: INTERNAL MEDICINE

## 2022-05-06 PROCEDURE — 120N000003 HC R&B IMCU UMMC

## 2022-05-06 PROCEDURE — 99233 SBSQ HOSP IP/OBS HIGH 50: CPT | Performed by: INTERNAL MEDICINE

## 2022-05-06 PROCEDURE — 250N000013 HC RX MED GY IP 250 OP 250 PS 637: Performed by: INTERNAL MEDICINE

## 2022-05-06 PROCEDURE — 250N000013 HC RX MED GY IP 250 OP 250 PS 637: Performed by: STUDENT IN AN ORGANIZED HEALTH CARE EDUCATION/TRAINING PROGRAM

## 2022-05-06 PROCEDURE — 258N000001 HC RX 258: Performed by: EMERGENCY MEDICINE

## 2022-05-06 PROCEDURE — 250N000011 HC RX IP 250 OP 636: Performed by: INTERNAL MEDICINE

## 2022-05-06 PROCEDURE — 84100 ASSAY OF PHOSPHORUS: CPT | Performed by: INTERNAL MEDICINE

## 2022-05-06 PROCEDURE — 74018 RADEX ABDOMEN 1 VIEW: CPT | Mod: 26 | Performed by: STUDENT IN AN ORGANIZED HEALTH CARE EDUCATION/TRAINING PROGRAM

## 2022-05-06 PROCEDURE — 250N000009 HC RX 250: Performed by: PHYSICIAN ASSISTANT

## 2022-05-06 PROCEDURE — 85014 HEMATOCRIT: CPT | Performed by: INTERNAL MEDICINE

## 2022-05-06 PROCEDURE — 250N000011 HC RX IP 250 OP 636: Performed by: STUDENT IN AN ORGANIZED HEALTH CARE EDUCATION/TRAINING PROGRAM

## 2022-05-06 PROCEDURE — 92610 EVALUATE SWALLOWING FUNCTION: CPT | Mod: GN

## 2022-05-06 PROCEDURE — 99222 1ST HOSP IP/OBS MODERATE 55: CPT | Performed by: PSYCHIATRY & NEUROLOGY

## 2022-05-06 PROCEDURE — 80053 COMPREHEN METABOLIC PANEL: CPT | Performed by: INTERNAL MEDICINE

## 2022-05-06 PROCEDURE — 99233 SBSQ HOSP IP/OBS HIGH 50: CPT | Performed by: PHYSICIAN ASSISTANT

## 2022-05-06 PROCEDURE — 83735 ASSAY OF MAGNESIUM: CPT | Performed by: INTERNAL MEDICINE

## 2022-05-06 PROCEDURE — 92526 ORAL FUNCTION THERAPY: CPT | Mod: GN

## 2022-05-06 RX ORDER — POTASSIUM CHLORIDE 7.45 MG/ML
10 INJECTION INTRAVENOUS ONCE
Status: COMPLETED | OUTPATIENT
Start: 2022-05-06 | End: 2022-05-06

## 2022-05-06 RX ADMIN — ASPIRIN 81 MG CHEWABLE TABLET 81 MG: 81 TABLET CHEWABLE at 09:33

## 2022-05-06 RX ADMIN — ATORVASTATIN CALCIUM 40 MG: 40 TABLET, FILM COATED ORAL at 09:33

## 2022-05-06 RX ADMIN — DEXTROSE 15 G: 15 GEL ORAL at 20:09

## 2022-05-06 RX ADMIN — PREGABALIN 75 MG: 75 CAPSULE ORAL at 09:19

## 2022-05-06 RX ADMIN — POTASSIUM CHLORIDE 10 MEQ: 7.46 INJECTION, SOLUTION INTRAVENOUS at 10:08

## 2022-05-06 RX ADMIN — Medication 15 ML: at 09:28

## 2022-05-06 RX ADMIN — CYANOCOBALAMIN TAB 500 MCG 1000 MCG: 500 TAB at 09:30

## 2022-05-06 RX ADMIN — LEVETIRACETAM 500 MG: 5 INJECTION INTRAVENOUS at 19:58

## 2022-05-06 RX ADMIN — LEVOTHYROXINE SODIUM 88 MCG: 88 TABLET ORAL at 09:29

## 2022-05-06 RX ADMIN — HEPARIN SODIUM 5000 UNITS: 5000 INJECTION, SOLUTION INTRAVENOUS; SUBCUTANEOUS at 08:09

## 2022-05-06 RX ADMIN — Medication 25 MCG: at 09:24

## 2022-05-06 RX ADMIN — CARVEDILOL 6.25 MG: 6.25 TABLET, FILM COATED ORAL at 09:25

## 2022-05-06 RX ADMIN — LEVETIRACETAM 500 MG: 5 INJECTION INTRAVENOUS at 08:08

## 2022-05-06 RX ADMIN — THIAMINE HCL TAB 100 MG 100 MG: 100 TAB at 09:31

## 2022-05-06 RX ADMIN — DEXTROSE MONOHYDRATE 25 ML: 25 INJECTION, SOLUTION INTRAVENOUS at 20:58

## 2022-05-06 RX ADMIN — CARVEDILOL 6.25 MG: 6.25 TABLET, FILM COATED ORAL at 17:50

## 2022-05-06 RX ADMIN — DOXAZOSIN 1 MG: 1 TABLET ORAL at 22:26

## 2022-05-06 RX ADMIN — HUMAN INSULIN 1 UNITS/HR: 100 INJECTION, SOLUTION SUBCUTANEOUS at 13:22

## 2022-05-06 RX ADMIN — CEFTRIAXONE SODIUM 1 G: 1 INJECTION, POWDER, FOR SOLUTION INTRAMUSCULAR; INTRAVENOUS at 08:24

## 2022-05-06 RX ADMIN — DULOXETINE 20 MG: 20 CAPSULE, DELAYED RELEASE ORAL at 09:18

## 2022-05-06 RX ADMIN — HEPARIN SODIUM 5000 UNITS: 5000 INJECTION, SOLUTION INTRAVENOUS; SUBCUTANEOUS at 19:58

## 2022-05-06 RX ADMIN — PREGABALIN 75 MG: 75 CAPSULE ORAL at 19:58

## 2022-05-06 ASSESSMENT — ACTIVITIES OF DAILY LIVING (ADL)
ADLS_ACUITY_SCORE: 27
ADLS_ACUITY_SCORE: 21
ADLS_ACUITY_SCORE: 29
ADLS_ACUITY_SCORE: 21
ADLS_ACUITY_SCORE: 27
ADLS_ACUITY_SCORE: 21
ADLS_ACUITY_SCORE: 23
ADLS_ACUITY_SCORE: 27
ADLS_ACUITY_SCORE: 21
ADLS_ACUITY_SCORE: 29
ADLS_ACUITY_SCORE: 23
ADLS_ACUITY_SCORE: 21
ADLS_ACUITY_SCORE: 29
ADLS_ACUITY_SCORE: 25
ADLS_ACUITY_SCORE: 21
ADLS_ACUITY_SCORE: 29
ADLS_ACUITY_SCORE: 23
ADLS_ACUITY_SCORE: 29

## 2022-05-06 NOTE — PROGRESS NOTES
Care Coordiantor  D/I: Per chart review, pt is on IVAB and began enteral on 5/4/22  A: estimated discharge says 5/11/22  P: [facility] for IVAB/enteral_____per email:  Good afternoon,    We are still reviewing benefits as patient s Medicare plan needs a criteria check with our nutrition team for enteral. Upon reviewing IV ABX coverage, we would bill the drug to their Part D plan since there is no coverage for IV ABX with Medicare. Upon running a test claim, [facility] is not contracted with their Part D plan so we cannot provide the IV ABX therapy. Would you like us to still review enteral coverage? Or should we reach out to [facility] or [facility] [employer] for coverage for both therapies?    Thanks,    Camron Borrego  Intake/   [facility]   [facility]   [address] [place] [id number]   [email or web address]  [email or web address]   Office: [phone]  Fax: [phone]   Connect with [facility] on social media.    I asked Camron to [facility] to [facility] to [facility].

## 2022-05-06 NOTE — CONSULTS
"        Initial Psychiatric Consult   Consult date: May 6, 2022         Reason for Consult, requesting source:    Depression?   Requesting source: Paul Kulkarni    Labs and imaging reviewed.        HPI:   From 5/3 admit note:   \"Isabell Matthews is a 61 year old female, with h/o acute ischemic stroke of right frontopperital punctate stroke due to small-vessel occlusion (was hospitalized in 4/5/22), seizure disorder, DMII, CKD3, HTN, HLD, CAD s/p PCI, hypothyroidism, vascular dementia, presenting with hyperglycemia 2/2 DKA.   Of note in 4/2022, pt presented with 1 day of difficulty waking and 45 mins of L eye vision loss.  IV thrombolysis was not given  due to unclear or unfavorable risk-benefit profile for extended window thrombolysis beyond the conventional 4.5 hour time window. Etiology was thought to be small vessel disease.  Was recommended asa 81 mg daily with [id number] goal <7.\"    She is seen today for psychiatric consultation today due to the daughters concern about possible depression, and this has been a concern from the care team as well.  She certainly looks depressed and has a very flat affect, but she absolutely denies being depressed or anxious.  She says her appetite is pretty good, no loss of interest usual activities, is not hopeless or suicidal and no history of suicide attempts.  She denies any visual or auditory hallucinations or significant mental status changes.  During the interview she did struggle with retrieving historical information and it was clear that she was having some memory difficulties but thought process was overall goal-directed        Past Psychiatric History:   She said that she was depressed many years ago when her children were young.  It seems that she may have had a difficult marriage, but she denies abuse.        Substance Use and History:   No episodes of heavy alcohol use, she did use cannabis in the remote past and has quit cigarettes        Past Medical History:   PAST " MEDICAL HISTORY:   Past Medical History:   Diagnosis Date     Chronic pain      Coronary atherosclerosis 6/14/2016     Essential hypertension      Ex-cigarette smoker     quit 7/2018 over 35 years     Ex-cigarette smoker      Hyperlipidemia      Hypothyroid      Seizures (H)      Stroke (H)      Vascular dementia (H)        PAST SURGICAL HISTORY:   Past Surgical History:   Procedure Laterality Date     athroplasty hip Bilateral     2003, 2006     OTHER SURGICAL HISTORY      athroplasty hip     STENT               Family History:   FAMILY HISTORY:   Family History   Problem Relation Age of Onset     Acute Myocardial Infarction Father      Heart Disease Maternal Grandmother      Dementia Maternal Grandmother      Myocardial Infarction Father      Dementia Paternal Grandmother      Heart Disease Paternal Grandmother            Social History:   She [place] [facility] and was the middle of 3 children.  She graduated high school but it was difficult due to some learning disabilities.  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  She has 3 grown children, no grandchildren.  She says that lately she has been living with her daughter who provides her with assistance.  She claims to generally get around okay on her own, but admits to needing help.          Physical ROS:   The 10 point Review of Systems is negative other than noted in the HPI or here.           Medications:       levothyroxine  88 mcg Oral or Feeding Tube Daily     aspirin  81 mg Oral or Feeding Tube Daily     atorvastatin  40 mg Oral or Feeding Tube Daily     carvedilol  6.25 mg Oral or Feeding Tube BID w/meals     cefTRIAXone  1 g Intravenous Q24H     cyanocobalamin  1,000 mcg Oral or Feeding Tube Daily     doxazosin  1 mg Oral or Feeding Tube At Bedtime     DULoxetine  20 mg Oral or Feeding Tube Daily     heparin ANTICOAGULANT  5,000 Units Subcutaneous BID     insulin aspart   Subcutaneous TID AC     levETIRAcetam  500 mg  Intravenous BID     [Held by provider] levETIRAcetam  500 mg Oral BID     multivitamins w/minerals  15 mL Per Feeding Tube Daily     [Held by provider] NIFEdipine ER  60 mg Oral Daily     pregabalin  75 mg Oral or Feeding Tube BID     sodium chloride (PF)  3 mL Intracatheter Q8H     sodium chloride (PF)  3 mL Intracatheter Q8H     thiamine  100 mg Per Feeding Tube Daily     Vitamin D3  25 mcg Oral or Feeding Tube Daily              Allergies:     Allergies   Allergen Reactions     Bee Pollen Headache     Pollen Extract Headache          Labs:     Recent Results (from the past 48 hour(s))   Glucose by meter    Collection Time: 05/04/22  2:22 PM   Result Value Ref Range    GLUCOSE BY METER POCT 123 (H) 70 - 99 mg/dL   Glucose by meter    Collection Time: 05/04/22  3:02 PM   Result Value Ref Range    GLUCOSE BY METER POCT 124 (H) 70 - 99 mg/dL   Glucose by meter    Collection Time: 05/04/22  4:00 PM   Result Value Ref Range    GLUCOSE BY METER POCT 114 (H) 70 - 99 mg/dL   Glucose by meter    Collection Time: 05/04/22  4:59 PM   Result Value Ref Range    GLUCOSE BY METER POCT 147 (H) 70 - 99 mg/dL   Blood gas venous    Collection Time: 05/04/22  5:05 PM   Result Value Ref Range    pH Venous 7.36 7.32 - 7.43    pCO2 Venous 42 40 - 50 mm Hg    pO2 Venous 34 25 - 47 mm Hg    Bicarbonate Venous 24 21 - 28 mmol/L    Base Excess/Deficit (+/-) -1.9 -7.7 - 1.9 mmol/L    FIO2 21    Glucose by meter    Collection Time: 05/04/22  6:01 PM   Result Value Ref Range    GLUCOSE BY METER POCT 132 (H) 70 - 99 mg/dL   Glucose by meter    Collection Time: 05/04/22  6:12 PM   Result Value Ref Range    GLUCOSE BY METER POCT 132 (H) 70 - 99 mg/dL   Glucose by meter    Collection Time: 05/04/22  7:03 PM   Result Value Ref Range    GLUCOSE BY METER POCT 126 (H) 70 - 99 mg/dL   Glucose by meter    Collection Time: 05/04/22  7:15 PM   Result Value Ref Range    GLUCOSE BY METER POCT 129 (H) 70 - 99 mg/dL   Glucose by meter    Collection Time:  05/04/22  8:08 PM   Result Value Ref Range    GLUCOSE BY METER POCT 98 70 - 99 mg/dL   Glucose by meter    Collection Time: 05/04/22  9:06 PM   Result Value Ref Range    GLUCOSE BY METER POCT 115 (H) 70 - 99 mg/dL   Blood gas venous    Collection Time: 05/04/22  9:37 PM   Result Value Ref Range    pH Venous 7.35 7.32 - 7.43    pCO2 Venous 43 40 - 50 mm Hg    pO2 Venous 26 25 - 47 mm Hg    Bicarbonate Venous 24 21 - 28 mmol/L    Base Excess/Deficit (+/-) -1.7 -7.7 - 1.9 mmol/L    FIO2 21    Basic metabolic panel    Collection Time: 05/04/22  9:37 PM   Result Value Ref Range    Sodium 144 133 - 144 mmol/L    Potassium 3.6 3.4 - 5.3 mmol/L    Chloride 113 (H) 94 - 109 mmol/L    Carbon Dioxide (CO2) 24 20 - 32 mmol/L    Anion Gap 7 3 - 14 mmol/L    Urea Nitrogen 13 7 - 30 mg/dL    Creatinine 1.24 (H) 0.52 - 1.04 mg/dL    Calcium 8.6 8.5 - 10.1 mg/dL    Glucose 152 (H) 70 - 99 mg/dL    GFR Estimate 49 (L) >60 mL/min/1.73m2   Glucose by meter    Collection Time: 05/04/22 10:20 PM   Result Value Ref Range    GLUCOSE BY METER POCT 196 (H) 70 - 99 mg/dL   Glucose by meter    Collection Time: 05/04/22 11:28 PM   Result Value Ref Range    GLUCOSE BY METER POCT 219 (H) 70 - 99 mg/dL   Glucose by meter    Collection Time: 05/05/22 12:31 AM   Result Value Ref Range    GLUCOSE BY METER POCT 186 (H) 70 - 99 mg/dL   Glucose by meter    Collection Time: 05/05/22  1:58 AM   Result Value Ref Range    GLUCOSE BY METER POCT 156 (H) 70 - 99 mg/dL   Glucose by meter    Collection Time: 05/05/22  2:59 AM   Result Value Ref Range    GLUCOSE BY METER POCT 117 (H) 70 - 99 mg/dL   Glucose by meter    Collection Time: 05/05/22  4:03 AM   Result Value Ref Range    GLUCOSE BY METER POCT 125 (H) 70 - 99 mg/dL   Glucose by meter    Collection Time: 05/05/22  5:07 AM   Result Value Ref Range    GLUCOSE BY METER POCT 175 (H) 70 - 99 mg/dL   Glucose by meter    Collection Time: 05/05/22  6:10 AM   Result Value Ref Range    GLUCOSE BY METER POCT 198 (H)  70 - 99 mg/dL   Glucose by meter    Collection Time: 05/05/22  6:58 AM   Result Value Ref Range    GLUCOSE BY METER POCT 184 (H) 70 - 99 mg/dL   Glucose by meter    Collection Time: 05/05/22  7:56 AM   Result Value Ref Range    GLUCOSE BY METER POCT 151 (H) 70 - 99 mg/dL   Blood gas venous    Collection Time: 05/05/22  8:36 AM   Result Value Ref Range    pH Venous 7.39 7.32 - 7.43    pCO2 Venous 33 (L) 40 - 50 mm Hg    pO2 Venous 61 (H) 25 - 47 mm Hg    Bicarbonate Venous 20 (L) 21 - 28 mmol/L    Base Excess/Deficit (+/-) -4.1 -7.7 - 1.9 mmol/L    FIO2 21    Extra Green Top (Lithium Heparin) Tube    Collection Time: 05/05/22  8:36 AM   Result Value Ref Range    Hold Specimen JIC    Vancomycin level    Collection Time: 05/05/22  8:37 AM   Result Value Ref Range    Vancomycin 13.8   mg/L   CBC with platelets    Collection Time: 05/05/22  8:37 AM   Result Value Ref Range    WBC Count 5.8 4.0 - 11.0 10e3/uL    RBC Count 3.79 (L) 3.80 - 5.20 10e6/uL    Hemoglobin 10.8 (L) 11.7 - 15.7 g/dL    Hematocrit 34.2 (L) 35.0 - 47.0 %    MCV 90 78 - 100 fL    MCH 28.5 26.5 - 33.0 pg    MCHC 31.6 31.5 - 36.5 g/dL    RDW 14.1 10.0 - 15.0 %    Platelet Count 150 150 - 450 10e3/uL   Comprehensive metabolic panel    Collection Time: 05/05/22  8:37 AM   Result Value Ref Range    Sodium 142 133 - 144 mmol/L    Potassium 3.1 (L) 3.4 - 5.3 mmol/L    Chloride 113 (H) 94 - 109 mmol/L    Carbon Dioxide (CO2) 19 (L) 20 - 32 mmol/L    Anion Gap 10 3 - 14 mmol/L    Urea Nitrogen 13 7 - 30 mg/dL    Creatinine 1.39 (H) 0.52 - 1.04 mg/dL    Calcium 8.1 (L) 8.5 - 10.1 mg/dL    Glucose 128 (H) 70 - 99 mg/dL    Alkaline Phosphatase 129 40 - 150 U/L    AST 27 0 - 45 U/L    ALT 16 0 - 50 U/L    Protein Total 5.0 (L) 6.8 - 8.8 g/dL    Albumin 2.3 (L) 3.4 - 5.0 g/dL    Bilirubin Total 0.4 0.2 - 1.3 mg/dL    GFR Estimate 42 (L) >60 mL/min/1.73m2   Glucose by meter    Collection Time: 05/05/22  8:57 AM   Result Value Ref Range    GLUCOSE BY METER POCT 132  (H) 70 - 99 mg/dL   Glucose by meter    Collection Time: 05/05/22 10:23 AM   Result Value Ref Range    GLUCOSE BY METER POCT 131 (H) 70 - 99 mg/dL   Glucose by meter    Collection Time: 05/05/22 10:57 AM   Result Value Ref Range    GLUCOSE BY METER POCT 115 (H) 70 - 99 mg/dL   Basic metabolic panel    Collection Time: 05/05/22 11:16 AM   Result Value Ref Range    Sodium 141 133 - 144 mmol/L    Potassium 3.3 (L) 3.4 - 5.3 mmol/L    Chloride 112 (H) 94 - 109 mmol/L    Carbon Dioxide (CO2) 22 20 - 32 mmol/L    Anion Gap 7 3 - 14 mmol/L    Urea Nitrogen 12 7 - 30 mg/dL    Creatinine 1.46 (H) 0.52 - 1.04 mg/dL    Calcium 8.1 (L) 8.5 - 10.1 mg/dL    Glucose 126 (H) 70 - 99 mg/dL    GFR Estimate 40 (L) >60 mL/min/1.73m2   Blood gas venous    Collection Time: 05/05/22 11:16 AM   Result Value Ref Range    pH Venous 7.40 7.32 - 7.43    pCO2 Venous 34 (L) 40 - 50 mm Hg    pO2 Venous 52 (H) 25 - 47 mm Hg    Bicarbonate Venous 21 21 - 28 mmol/L    Base Excess/Deficit (+/-) -3.1 -7.7 - 1.9 mmol/L    FIO2 0    Phosphorus    Collection Time: 05/05/22 11:16 AM   Result Value Ref Range    Phosphorus 1.7 (L) 2.5 - 4.5 mg/dL   Magnesium    Collection Time: 05/05/22 11:16 AM   Result Value Ref Range    Magnesium 1.5 (L) 1.6 - 2.3 mg/dL   Glucose by meter    Collection Time: 05/05/22 12:04 PM   Result Value Ref Range    GLUCOSE BY METER POCT 116 (H) 70 - 99 mg/dL   Glucose by meter    Collection Time: 05/05/22  1:01 PM   Result Value Ref Range    GLUCOSE BY METER POCT 140 (H) 70 - 99 mg/dL   Glucose by meter    Collection Time: 05/05/22  2:09 PM   Result Value Ref Range    GLUCOSE BY METER POCT 137 (H) 70 - 99 mg/dL   Magnesium    Collection Time: 05/05/22  2:19 PM   Result Value Ref Range    Magnesium 2.1 1.6 - 2.3 mg/dL   Potassium    Collection Time: 05/05/22  2:19 PM   Result Value Ref Range    Potassium 3.6 3.4 - 5.3 mmol/L   Glucose by meter    Collection Time: 05/05/22  3:06 PM   Result Value Ref Range    GLUCOSE BY METER POCT 143  (H) 70 - 99 mg/dL   Glucose by meter    Collection Time: 05/05/22  4:17 PM   Result Value Ref Range    GLUCOSE BY METER POCT 122 (H) 70 - 99 mg/dL   Extra Green Top (Lithium Heparin) Tube    Collection Time: 05/05/22  4:58 PM   Result Value Ref Range    Hold Specimen [facility]    Blood gas venous    Collection Time: 05/05/22  4:59 PM   Result Value Ref Range    pH Venous 7.35 7.32 - 7.43    pCO2 Venous 41 40 - 50 mm Hg    pO2 Venous 49 (H) 25 - 47 mm Hg    Bicarbonate Venous 22 21 - 28 mmol/L    Base Excess/Deficit (+/-) -3.1 -7.7 - 1.9 mmol/L    FIO2 3    Glucose by meter    Collection Time: 05/05/22  5:25 PM   Result Value Ref Range    GLUCOSE BY METER POCT 144 (H) 70 - 99 mg/dL   Glucose by meter    Collection Time: 05/05/22  6:11 PM   Result Value Ref Range    GLUCOSE BY METER POCT 152 (H) 70 - 99 mg/dL   Glucose by meter    Collection Time: 05/05/22  6:56 PM   Result Value Ref Range    GLUCOSE BY METER POCT 146 (H) 70 - 99 mg/dL   Glucose by meter    Collection Time: 05/05/22  8:33 PM   Result Value Ref Range    GLUCOSE BY METER POCT 110 (H) 70 - 99 mg/dL   Phosphorus    Collection Time: 05/05/22  8:51 PM   Result Value Ref Range    Phosphorus 2.9 2.5 - 4.5 mg/dL   Potassium    Collection Time: 05/05/22  8:51 PM   Result Value Ref Range    Potassium 3.5 3.4 - 5.3 mmol/L   Glucose by meter    Collection Time: 05/05/22  9:40 PM   Result Value Ref Range    GLUCOSE BY METER POCT 85 70 - 99 mg/dL   Glucose by meter    Collection Time: 05/05/22 11:12 PM   Result Value Ref Range    GLUCOSE BY METER POCT 139 (H) 70 - 99 mg/dL   Glucose by meter    Collection Time: 05/06/22 12:40 AM   Result Value Ref Range    GLUCOSE BY METER POCT 157 (H) 70 - 99 mg/dL   Glucose by meter    Collection Time: 05/06/22  2:13 AM   Result Value Ref Range    GLUCOSE BY METER POCT 117 (H) 70 - 99 mg/dL   Glucose by meter    Collection Time: 05/06/22  3:58 AM   Result Value Ref Range    GLUCOSE BY METER POCT 120 (H) 70 - 99 mg/dL   Glucose by meter     Collection Time: 05/06/22  6:01 AM   Result Value Ref Range    GLUCOSE BY METER POCT 174 (H) 70 - 99 mg/dL   Glucose by meter    Collection Time: 05/06/22  6:59 AM   Result Value Ref Range    GLUCOSE BY METER POCT 131 (H) 70 - 99 mg/dL   Glucose by meter    Collection Time: 05/06/22  8:11 AM   Result Value Ref Range    GLUCOSE BY METER POCT 86 70 - 99 mg/dL   CBC with platelets    Collection Time: 05/06/22  8:57 AM   Result Value Ref Range    WBC Count 4.8 4.0 - 11.0 10e3/uL    RBC Count 4.04 3.80 - 5.20 10e6/uL    Hemoglobin 11.5 (L) 11.7 - 15.7 g/dL    Hematocrit 36.3 35.0 - 47.0 %    MCV 90 78 - 100 fL    MCH 28.5 26.5 - 33.0 pg    MCHC 31.7 31.5 - 36.5 g/dL    RDW 14.3 10.0 - 15.0 %    Platelet Count 121 (L) 150 - 450 10e3/uL   Comprehensive metabolic panel    Collection Time: 05/06/22  8:57 AM   Result Value Ref Range    Sodium 142 133 - 144 mmol/L    Potassium 3.6 3.4 - 5.3 mmol/L    Chloride 114 (H) 94 - 109 mmol/L    Carbon Dioxide (CO2) 21 20 - 32 mmol/L    Anion Gap 7 3 - 14 mmol/L    Urea Nitrogen 8 7 - 30 mg/dL    Creatinine 1.18 (H) 0.52 - 1.04 mg/dL    Calcium 8.6 8.5 - 10.1 mg/dL    Glucose 82 70 - 99 mg/dL    Alkaline Phosphatase 161 (H) 40 - 150 U/L    AST 34 0 - 45 U/L    ALT 19 0 - 50 U/L    Protein Total 5.9 (L) 6.8 - 8.8 g/dL    Albumin 2.5 (L) 3.4 - 5.0 g/dL    Bilirubin Total 0.3 0.2 - 1.3 mg/dL    GFR Estimate 52 (L) >60 mL/min/1.73m2   Magnesium    Collection Time: 05/06/22  8:57 AM   Result Value Ref Range    Magnesium 1.9 1.6 - 2.3 mg/dL   Phosphorus    Collection Time: 05/06/22  8:57 AM   Result Value Ref Range    Phosphorus 2.5 2.5 - 4.5 mg/dL   Glucose by meter    Collection Time: 05/06/22  9:11 AM   Result Value Ref Range    GLUCOSE BY METER POCT 86 70 - 99 mg/dL   Glucose by meter    Collection Time: 05/06/22 10:03 AM   Result Value Ref Range    GLUCOSE BY METER POCT 164 (H) 70 - 99 mg/dL   Glucose by meter    Collection Time: 05/06/22  1:13 PM   Result Value Ref Range    GLUCOSE  "BY METER POCT 162 (H) 70 - 99 mg/dL   Glucose by meter    Collection Time: 05/06/22  2:04 PM   Result Value Ref Range    GLUCOSE BY METER POCT 166 (H) 70 - 99 mg/dL          Physical and Psychiatric Examination:     /69 (BP Location: Right arm, Patient Position: Semi-Cantu's, Cuff Size: Adult Regular)   Pulse 93   Temp 98.3  F (36.8  C) (Oral)   Resp 18   Wt 59.5 kg (131 lb 2.8 oz)   SpO2 97%   BMI 23.24 kg/m    Weight is 131 lbs 2.78 oz  Body mass index is 23.24 kg/m .    Physical Exam:  I have reviewed the physical exam as documented by by the medical team and agree with findings and assessment and have no additional findings to add at this time.    Mental Status Exam:  Lying quietly in the hospital bed, is well groomed, pleasant, cooperative. Speech slowed with increased latency in responding. Moves upper extremities with some difficulty. Associations tight. Mood is \"OK.\"  Affect quite restricted, almost flat. Thought process logical, linear. Thought content negative for suicidal thoughts or delusions. Oriented to day of the week, month, but not date or place  Recent and remote memory, attention span and concentration are impaired. Fund of knowledge, use of language difficult to assess. Insight and judgment fair to poor.              DSM-5 Diagnosis:   311 (F32.9) Unspecified Depressive Disorder    unspecified neurocognitive disorder           Assessment:   She certainly looks depressed given her flat affect and slow response to questions.  However, she absolutely denies feeling depressed and does not have other symptoms of depression.  In the medically ill the question \" are you depressed?\" is actually quite sensitive and picking up depression. I have assigned her a depression diagnosis due to past history and her appearance of being depressed.  Unfortunately I was unable to reach her daughter to obtain collateral information, such as her thoughts regarding depression and how Lisa fares at " "home.  I see that PT and OT consultations have been placed; this should be valuable to better gauge her cognitive status and mobility, degree of help that she needs.  Although she does look depressed her appearance, restricted affect could certainly be due to her CVAs rather than depression.  Does have significant memory impairment which should be further evaluated.          Summary of Recommendations:   For now I would hold off on antidepressants but if her daughter insist that we try something and I think that Lexapro 10 mg/day would be a good option  PT and OT should be providing input regarding her options following discharge from the hospital.  Page me or re-consult psychiatry as needed.     Micah Jha M.D.   [facility]   Contact information available via [facility] Paging/Directory.  If I am not available, then [facility] intake ([phone]) should know who   Is on call          \"This dictation was performed with voice recognition software and may contain errors,  omissions and inadvertent word substitution.\"           "

## 2022-05-06 NOTE — PROGRESS NOTES
05/06/22 1504   General Information   Onset of Illness/Injury or Date of Surgery 05/03/22   Referring Physician Paul Kulkarni MD   Patient/Family Therapy Goal Statement (SLP) None stated   Pertinent History of Current Problem Pt is a 61 year old female, with h/o acute ischemic stroke of right frontopperital punctate stroke due to small-vessel occlusion (was hospitalized in 4/5/22), multiple lacunar and punctate ischemic infarcts and multifocal moderate to severe stenosis in multiple vascular territories, seizure disorder, DMII, CKD3, HTN, HLD, CAD s/p PCI, hypothyroidism, vascular dementia, presenting with AMS/aphasia, found to have hyperglycemia 2/2 DKA and elevated LA. Clinical swallow eval completed per MD order d/t c/f oral holding of bolus when swallowing.   General Observations Alert, response latencies but able to follow commands   Past History of Dysphagia   Pt familiar to this service, seen on April 2022, minced and moist with thin liquids recommended.     Type of Evaluation   Type of Evaluation Swallow Evaluation   Oral Motor   Oral Musculature   (generalized weakness)   Structural Abnormalities none present   Mucosal Quality adequate   Dentition (Oral Motor)   Dentition (Oral Motor) some missing teeth;adequate dentition   Facial Symmetry (Oral Motor)   Facial Symmetry (Oral Motor) WNL   Lip Function (Oral Motor)   Lip Range of Motion (Oral Motor) WNL   Tongue Function (Oral Motor)   Tongue ROM (Oral Motor) WNL   Jaw Function (Oral Motor)   Jaw Function (Oral Motor) WNL   Cough/Swallow/Gag Reflex (Oral Motor)   Comment, Cough/Swallow/Gag Reflex (Oral Motor) adequate   Vocal Quality/Secretion Management (Oral Motor)   Vocal Quality (Oral Motor) WFL   General Swallowing Observations   Current Diet/Method of Nutritional Intake (General Swallowing Observations, NIS) NPO   Respiratory Support (General Swallowing Observations) none   Swallowing Evaluation Clinical swallow evaluation   Clinical Swallow  Evaluation   Feeding Assistance minimal assistance required   Clinical Swallow Evaluation Textures Trialed thin liquids;pureed;solid foods   Clinical Swallow Eval: Thin Liquid Texture Trial   Mode of Presentation, Thin Liquids cup;straw;self-fed   Volume of Liquid or Food Presented 4oz thin water   Oral Phase of Swallow WFL   Pharyngeal Phase of Swallow intact   Diagnostic Statement No overt s/sx of aspiration   Clinical Swallow Evaluation: Puree Solid Texture Trial   Mode of Presentation, Puree spoon;self-fed   Volume of Puree Presented 3oz pudding   Oral Phase, Puree WFL   Pharyngeal Phase, Puree intact   Diagnostic Statement No overt s/sx of aspiration   Clinical Swallow Evaluation: Solid Food Texture Trial   Mode of Presentation self-fed   Volume Presented 2 crackers   Oral Phase   (prolonged mastication, min oral residue)   Pharyngeal Phase throat clearing   Diagnostic Statement Oral phase prolonged, min oral residue, throat clearing noted x1   Esophageal Phase of Swallow   Patient reports or presents with symptoms of esophageal dysphagia No   Swallowing Recommendations   Diet Consistency Recommendations easy to chew (level 7);thin liquids (level 0)   Supervision Level for Intake 1:1 supervision needed   Mode of Delivery Recommendations bolus size, small;food moistened;slow rate of intake   Monitoring/Assistance Required (Eating/Swallowing) stop eating activities when fatigue is present;monitor for cough or change in vocal quality with intake   Recommended Feeding/Eating Techniques (Swallow Eval) maintain upright sitting position for eating   Medication Administration Recommendations, Swallowing (SLP) as tolerated   Instrumental Assessment Recommendations instrumental evaluation not recommended at this time;reassess via non-instrumental clinical swallow evaluation   General Therapy Interventions   Planned Therapy Interventions Dysphagia Treatment   Dysphagia treatment Modified diet education;Instruction of safe  swallow strategies;Compensatory strategies for swallowing   Clinical Impression   Criteria for Skilled Therapeutic Interventions Met (SLP Eval) Yes, treatment indicated   SLP Diagnosis Minimal dysphagia in setting of AMS   Risks & Benefits of therapy have been explained evaluation/treatment results reviewed;care plan/treatment goals reviewed;risks/benefits reviewed;current/potential barriers reviewed;participants voiced agreement with care plan;participants included;patient   Clinical Impression Comments   Clinical swallow eval completed per MD order. Pt presents with minimal dysphagia in setting of AMS, hx of stroke and dementia. Oral mech exam remarkable for some missing teeth, otherwise unremarkable. Assessed with thin liquids, puree, and cracker. Oral phase prolonged for cracker. Pharyngeal phase characterized by throat clearing with cracker, but no overt s/sx of aspiration with water or pudding. Recommend Easy to Chew diet (IDDSI 7 ETC) and thin liquids (IDDSI 0) with 1:1 supervision to ensure pt is following safe-swallowing strategies. Ensure the pt is alert and upright for all PO, taking small bites/sips at slow rate. Anticipate the pt will meet all ST goals prior to discharge.      SLP Discharge Planning   SLP Discharge Recommendation home with assist   SLP Rationale for DC Rec anticipate pt will meet all ST goals prior to d/c   SLP Brief overview of current status  Recommend Easy to Chew diet (IDDSI 7 ETC) and thin liquids (IDDSI 0) with 1:1 supervision to ensure pt is following safe-swallowing strategies. Ensure the pt is alert and upright for all PO, taking small bites/sips at slow rate.    Total Evaluation Time   Total Evaluation Time (Minutes) 18

## 2022-05-06 NOTE — PROGRESS NOTES
Major Shift Events: Patient's mental status continues to improve.  Still waxes and wanes with confusion but now oriented to person, place, and situation. Following commands. Had two large loose bowel movements.  UOP hard to track since stool keeps blocking her purewick.  Soaked multiple briefs.  Remains on room air, sinus rhythm in the 80-90s.  No seizure activity witnessed. Patient's bed mobility improving as well, can help turn herself for cleanups. TF started @ 22:00, D10 stopped @ 06:00 once TF hit 25cc/hr. Insulin dripped continued until TF @ goal per order.    Plan: Continue towards goal TF.  Maintain electrolytes.    For vital signs and complete assessments, please see documentation flowsheets.

## 2022-05-06 NOTE — PROGRESS NOTES
Upon evaluation, pt had removed FT and TF are off. BG >200. On IV insulin 6 units per hour at time FT was pulled (on algorithim 2).   SLP to eval today.     Instructed RN and placed patient care order;   - reduce IV insulin to 2 units per hour, and lower to algorithm 1 now.  -no need for D10%, as we have made pre-emptive reductions to IV insulin  -to continue managing IV insulin drip per algorithm 1 subsequently.       Full progress note to follow     Shira Luna PA-C

## 2022-05-06 NOTE — PROGRESS NOTES
IP Diabetes Management  Daily Note           Assessment and Plan:   HPI: Isabell Matthews is a 61 year old female, with h/o  seizure disorder, type 2 diabetes treated as type 1, CKD3, HTN, HLD, CAD s/p PCI, hypothyroidism, vascular dementia, multiple prior ischemic strokes and recent acute ischemic stroke of right frontoparietal punctate stroke due to small-vessel occlusion (was hospitalized in 4/5/22), presenting with DKA in setting of infection and withheld insulin.    Assessment:   1)Type 2 Diabetes Mellitus treated as type 1, uncontrolled (A1c 8.9), presenting with DKA precipitated by lack of insulin and UTI  2) TF induced hyperglycemia    Plan:    -continue IV insulin gtt until a nutrition plan is finalized (TF resumed and titrated to goal, versus eating consistently)   -at 1200- instructed RN to reduce IV drip to algorithm 1, at 2 units per hour, due to the sudden stoppage of TF. NO D10% needed due to the pre-emptive changes made   -Novolog 1:15g CHO coverage with meals and snacks/supplements added, when pt allowed to eat   -BG monitoring Q1-2 hours on IV insulin    -if she is going to remain prolonged NPO- would recommend resuming some dextrose mIVF to keep BG stable and keep on IV insulin without interruptions. Defer to primary team.    -hypoglycemia protocol   -carb counting protocol   -diabetes education needs will be assessed closer to discharge.     Outpatient follow up: TBD (PCP vs. [facility])  Plan discussed with patient, bedside RN, and paged primary team.       Interval History and Assessment: interval glucose trend reviewed:   BG stable on IV insulin, 1-3 units per hour. With D10% at 50cc/hour and TF titrating to 25cc/hour  D10% stopped this AM when TF reached 25cc/hour- order is now discontinued.     Isabell pulled FT out this AM; replaced and she pulled out again. TF off. SLP to evaluate for diet advancement. No clear plans to attempt FT replacement and resume TF again.      Historical  5/5:   She states that the patient has not been eating well at all since April.  She had been reducing her Lantus doses accordingly, but held the evening Lantus dose on Monday night.  BG then scarlett and patient presented in DKA.  She reports that her mom started vomiting when BG scarlett.        Current nutritional intake and type: Orders Placed This Encounter      NPO for Medical/Clinical Reasons Except for: No Exceptions  Continuous TF: osmolite 1.5 Antonio at 15 ml/hour and advance by 10ml/hour Q 8 hours pending pt's tolerance.  With goal of 55 ml/hour for 268g CHO total per day.     Steroid planning: none    PTA Diabetes Regimen:   Lantus 14-20 units at bedtime (per daughter's discretion)  humalog 1-3 units with meals    Discharge Planning: TBD           Diabetes History:   Type of Diabetes: Type 1 Diabetes Mellitus, TPN/Enteral Feeding Induced Hyperglycemia  Lab Results   Component Value Date    A1C 8.9 05/03/2022    A1C 8.8 04/05/2022    A1C 8.2 08/02/2021    A1C 7.8 06/21/2021    A1C 11.7 09/27/2020              Review of Systems:     The Review of Systems is negative other than noted in the Interval History.           Medications:     Current Facility-Administered Medications   Medication      SYNTHROID **BRAND NAME ONLY** tablet 88 mcg     aspirin (ASA) chewable tablet 81 mg     atorvastatin (LIPITOR) tablet 40 mg     carvedilol (COREG) tablet 6.25 mg     cefTRIAXone (ROCEPHIN) 1 g vial to attach to  mL bag for ADULTS or NS 50 mL bag for PEDS     cyanocobalamin (VITAMIN B-12) tablet 1,000 mcg     dextrose 10% infusion     dextrose 10% infusion     dextrose 50 % injection 25-50 mL     glucose gel 15-30 g    Or     dextrose 50 % injection 25-50 mL    Or     glucagon injection 1 mg     doxazosin (CARDURA) tablet 1 mg     DULoxetine (CYMBALTA) DR capsule 20 mg     heparin ANTICOAGULANT injection 5,000 Units     hydrALAZINE (APRESOLINE) injection 10 mg     insulin 1 unit/mL in saline (NovoLIN, HumuLIN Regular) drip  - ADULT IV Infusion     insulin aspart (NovoLOG) injection (RAPID ACTING)     insulin aspart (NovoLOG) injection (RAPID ACTING)     levETIRAcetam (KEPPRA) intermittent infusion 500 mg     [Held by provider] levETIRAcetam (KEPPRA) tablet 500 mg     lidocaine (LMX4) cream     lidocaine 1 % 0.1-1 mL     melatonin tablet 1 mg     multivitamins w/minerals liquid 15 mL     [Held by provider] NIFEdipine ER (ADALAT CC) 24 hr tablet 60 mg     ondansetron (ZOFRAN-ODT) ODT tab 4 mg    Or     ondansetron (ZOFRAN) injection 4 mg     pregabalin (LYRICA) capsule 75 mg     senna-docusate (SENOKOT-S/PERICOLACE) 8.6-50 MG per tablet 1 tablet    Or     senna-docusate (SENOKOT-S/PERICOLACE) 8.6-50 MG per tablet 2 tablet     sodium chloride (PF) 0.9% PF flush 3 mL     sodium chloride (PF) 0.9% PF flush 3 mL     sodium chloride (PF) 0.9% PF flush 3 mL     sodium chloride (PF) 0.9% PF flush 3 mL     thiamine (B-1) tablet 100 mg     Vitamin D3 (CHOLECALCIFEROL) tablet 25 mcg            Physical Exam:    /69 (BP Location: Right arm, Cuff Size: Adult Regular)   Pulse 80   Temp 97.9  F (36.6  C) (Axillary)   Resp 20   Wt 59.5 kg (131 lb 2.8 oz)   SpO2 98%   BMI 23.24 kg/m    General: sitting up in bed.   HEENT: normocephalic, atraumatic. Oral mucous membranes dry.  Lungs: unlabored respiration, no cough  ABD: rounded, nondistended  Skin: warm and dry, no obvious lesions  MSK:  moves all extremities  Mental status:  alert to person. Answers some questions nonsensically  Psych:  calm interaction with provider.             Data:     Recent Labs   Lab 05/06/22  1003 05/06/22  0911 05/06/22  0857 05/06/22  0811 05/06/22  0659 05/06/22  0601   * 86 82 86 131* 174*     Lab Results   Component Value Date    WBC 4.8 05/06/2022    WBC 5.8 05/05/2022    WBC 8.1 05/04/2022    HGB 11.5 (L) 05/06/2022    HGB 10.8 (L) 05/05/2022    HGB 11.2 (L) 05/04/2022    HCT 36.3 05/06/2022    HCT 34.2 (L) 05/05/2022    HCT 35.3 05/04/2022    MCV 90  05/06/2022    MCV 90 05/05/2022    MCV 90 05/04/2022     (L) 05/06/2022     05/05/2022     05/04/2022     Lab Results   Component Value Date     05/06/2022     05/05/2022     05/05/2022    POTASSIUM 3.6 05/06/2022    POTASSIUM 3.5 05/05/2022    POTASSIUM 3.6 05/05/2022    CHLORIDE 114 (H) 05/06/2022    CHLORIDE 112 (H) 05/05/2022    CHLORIDE 113 (H) 05/05/2022    CO2 21 05/06/2022    CO2 22 05/05/2022    CO2 19 (L) 05/05/2022     (H) 05/06/2022    GLC 86 05/06/2022    GLC 82 05/06/2022     Lab Results   Component Value Date    BUN 8 05/06/2022    BUN 12 05/05/2022    BUN 13 05/05/2022     Lab Results   Component Value Date    TSH 0.63 08/02/2021    TSH 0.65 09/27/2020     Lab Results   Component Value Date    AST 34 05/06/2022    AST 27 05/05/2022    AST 41 05/04/2022    ALT 19 05/06/2022    ALT 16 05/05/2022    ALT 15 05/04/2022    ALKPHOS 161 (H) 05/06/2022    ALKPHOS 129 05/05/2022    ALKPHOS 132 05/04/2022         35 minutes spent on the date of the encounter doing chart review, history and exam, documentation and further activities per the note      Over 50% of my time on the unit was spent counseling the patient and/or coordinating care regarding acute hyperglycemia management.  See note for details.    To contact Endocrine Diabetes service:   From 8AM-4PM: page inpatient diabetes provider that is following the patient  For questions or updates from 4PM-8AM: page the diabetes job code for on call fellow: [phone]    Shira Luna PA-C  Diabetes Management Service  Pager [phone]

## 2022-05-06 NOTE — PROGRESS NOTES
[facility]    Medicine Progress Note - Hospitalist Service, GOLD TEAM 10    Date of Admission:  5/3/2022    Assessment & Plan        Isabell Matthews is a 61 year old female, with h/o acute ischemic stroke of right frontopperital punctate stroke due to small-vessel occlusion (was hospitalized in 4/5/22), multiple lacunar and punctate ischemic infarcts and multifocal moderate to severe stenosis in multiple vascular territories, seizure disorder, DMII, CKD3, HTN, HLD, CAD s/p PCI, hypothyroidism, vascular dementia, presenting with AMS/aphasia, found to have hyperglycemia 2/2 DKA and elevated LA.    Changes today   NG tube placement- pulled twice and replaced  Speech eval   On insulin drip  Stable creatinine  Transition to ceftriaxone for aerococcus- improved   Restart D10 if not tolerating diet and hypoglycemic while on insulin drip           # Hyperglycemia 2/2 DKA  # UTI  # H/o DMII (No DEMETRIA ab/C-peptide)  Her glucose was 686, ketones 5.4, hgba1c 8.9. Had DKA in 10/2020. Per Endo, suspect insulin dependent DMII.     Etiology: may be due to UTI: UA showing bacteria and WB 15 with small LE. Per neuro note, it states that daughter/care taker has been dealing with respirator illness recently. It may also be due to skipped insulin evening 5/1, daytime 5/2 due to decreased appetite and concern for precipitating hypoglycemia per caretaker/daughter.  - s/p Zosyn and Vanc in ED, continued on medicine floor, may continue per primary team, ucx pending  - holding pta glargine 18 U at bedtime, pta lispro mealtime SSI for now  - pta pregabalin 75 mg bid (held)  - DKA proctocol ordered  - Endocrine consulted  - BMP, phos q2h ordered  - phos, BMP, Mg, CBC, abg ordered  - Bcx x1 pending from ED  - EKG ordered  - negative  covid, influenza, respiratory panel     # AGMA with uncompensated respiratory acidosis  # Hyperkalemia  # Elevated lactate  Resolved      AGMA due to lactate, ketoacidosis  2/2 DKA. Will improve with fluid resuscitation and insulin. Hyperkalemia 2/2 DKA/AGMA.    # Acute on Chronic kidney disease  Cr 1.84 on admission. Cr baseline 1.2-1.45. 2/2 volume depletion 2/2 DKA.       # Acute metabolic toxic encephalopathy  # Aphasia  # Concern for acute stroke  Of note in 4/2022, pt presented with 1 day of difficulty waking and 45 mins of L eye vision loss.  IV thrombolysis was not given  due to unclear or unfavorable risk-benefit profile for extended window thrombolysis beyond the conventional 4.5 hour time window. Etiology was thought to be small vessel disease.  Was recommended asa 81 mg daily with hgba1c goal <7.     Upon admission to the ED, there was concern for aphagia and stroke code was called, CT head showed no acute intracranial hemorrhages, showed small chronic infarcts. CTA head showed no intracranial arterial large vessel occlusion, and CT neck showed left carotid bifurcation moderate stenosis, atherosclerotic plaque results in less than 50% stenosis in the right carotid bulb and proximal cervical ICA, chronic right vertebral artery and proximal V1 with severe stenosis, but overall no significant interval change compared to CTA head and neck 04/10/2022. Not given TPA due to history of intracranial hemorrhage.      Etiology ddx: During stroke code, CT and CTA head imaging did not demonstrate clear new occlusive lesion or developing area of hypodensity concerning for stroke as primary etiology. Most likely toxic metabolic from DKA vs. Infection (respiratory vs UTI).  - pta atorvastatin 40 mg daily  - pta asa 81 mg daily  - q4h neuro checks  - consider MRI brain per primary team  - pending influenza, covid, respiratory panel     --Chronic diseases--  **Will place essential meds to IV instead of PTA PO since pt has AMS and is not able to do PO, not following commands**     #Seizure disorder  - PTA Keppra 500 mg BID (IV)     #CKD3  - BMP, phos q2h    #HTN  On admission /77.  -  PTA coreg 6.25 mg, and nifedipine 60 mg daily (instead of this placed PRN hydralzine 10 mg q6h)     #HLD  - PTA atrova 40 mg daily     #CAD s/p PCI  - PTA coreg and nifedipine 60 mg daily (instead of this placed PRN hydralzine 10 mg q6h, fpr SBP >170)     #Hypothyroidism  - PTA synthroid 88 mcg daily     #Vascular dementia  - Delirium precautions     # MDD  - pta duloxetine 20 mg daily     # Urinary retention  - pta doxazosin          Diet: Adult Formula Drip Feeding: Continuous Osmolite 1.5; Nasogastric tube; Goal Rate: 55; mL/hr; Medication - Feeding Tube Flush Frequency: At least 15-30 mL water before and after medication administration and with tube clogging; Amount to Send (Nutr...  Easy to Chew Diet (level 7)    DVT Prophylaxis: Heparin SQ  Parkre Catheter: Not present  Central Lines: None  Cardiac Monitoring: None  Code Status: Full Code      Disposition Plan   Expected Discharge: 05/11/2022   Anticipated discharge location:  Awaiting care coordination huddle  Delays:            The patient's care was discussed with the Patient.    Paul Kulkarni MD  Hospitalist Service, [address]  Securely message with the Vocera Web Console (learn more here)  Text page via [facility] Paging/Directory   Please see signed in provider for up to date coverage information      Clinically Significant Risk Factors Present on Admission             # Moderate Malnutrition: based on nutrition assessment     ______________________________________________________________________    Interval History   Responding to commands  No fever or chills  No bladder discomfort  No Chest pain  No SOB    Data reviewed today: I reviewed all medications, new labs and imaging results over the last 24 hours. I personally reviewed no images or EKG's today.    Physical Exam   Vital Signs: Temp: 98.3  F (36.8  C) Temp src: Oral BP: 123/69 Pulse: 93   Resp: 18 SpO2: 97 % O2 Device: None (Room air)  Oxygen Delivery: 3 LPM  Weight: 131 lbs 2.78 oz  Physical Exam  Constitutional:       Appearance: She is not ill-appearing or toxic-appearing.   HENT:      Head: Normocephalic.      Mouth/Throat:      Mouth: Mucous membranes are moist.   Cardiovascular:      Rate and Rhythm: Normal rate and regular rhythm.      Heart sounds: No murmur heard.    No friction rub. No gallop.   Pulmonary:      Effort: Pulmonary effort is normal. No respiratory distress.      Breath sounds: No stridor. No wheezing.   Abdominal:      General: Abdomen is flat. There is no distension.      Palpations: There is no mass.      Tenderness: There is no abdominal tenderness.   Musculoskeletal:      Right lower leg: No edema.      Left lower leg: No edema.   Neurological:      General: No focal deficit present.      Mental Status: She is oriented to person, place, and time.      Cranial Nerves: No cranial nerve deficit.      Sensory: No sensory deficit.      Motor: No weakness.      Comments: Upper ext appears spastic    Psychiatric:         Mood and Affect: Mood normal.         Behavior: Behavior normal.         Data   Recent Labs   Lab 05/06/22  1458 05/06/22  1404 05/06/22  1313 05/06/22  0911 05/06/22  0857 05/05/22  2140 05/05/22  2051 05/05/22  1506 05/05/22  1419 05/05/22  1204 05/05/22  1116 05/05/22  0857 05/05/22  0837 05/04/22  1006 05/04/22  1000 05/03/22  0235 05/03/22  0229   WBC  --   --   --   --  4.8  --   --   --   --   --   --   --  5.8  --  8.1   < >  --    HGB  --   --   --   --  11.5*  --   --   --   --   --   --   --  10.8*  --  11.2*   < >  --    MCV  --   --   --   --  90  --   --   --   --   --   --   --  90  --  90   < >  --    PLT  --   --   --   --  121*  --   --   --   --   --   --   --  150  --  161   < >  --    INR  --   --   --   --   --   --   --   --   --   --   --   --   --   --   --   --  1.3*   NA  --   --   --   --  142  --   --   --   --   --  141  --  142   < > 144   < >  --    POTASSIUM  --   --   --   --   3.6  --  3.5  --  3.6  --  3.3*  --  3.1*   < > 2.8*   < >  --    CHLORIDE  --   --   --   --  114*  --   --   --   --   --  112*  --  113*   < > 115*   < >  --    CO2  --   --   --   --  21  --   --   --   --   --  22  --  19*   < > 23   < >  --    BUN  --   --   --   --  8  --   --   --   --   --  12  --  13   < > 19   < >  --    CR  --   --   --   --  1.18*  --   --   --   --   --  1.46*  --  1.39*   < > 1.29*   < >  --    ANIONGAP  --   --   --   --  7  --   --   --   --   --  7  --  10   < > 6   < >  --    VERONICA  --   --   --   --  8.6  --   --   --   --   --  8.1*  --  8.1*   < > 8.6   < >  --    * 166* 162*   < > 82   < >  --    < >  --    < > 126*   < > 128*   < > 101*   < >  --    ALBUMIN  --   --   --   --  2.5*  --   --   --   --   --   --   --  2.3*  --  2.4*  --   --    PROTTOTAL  --   --   --   --  5.9*  --   --   --   --   --   --   --  5.0*  --  5.4*  --   --    BILITOTAL  --   --   --   --  0.3  --   --   --   --   --   --   --  0.4  --  0.3  --   --    ALKPHOS  --   --   --   --  161*  --   --   --   --   --   --   --  129  --  132  --   --    ALT  --   --   --   --  19  --   --   --   --   --   --   --  16  --  15  --   --    AST  --   --   --   --  34  --   --   --   --   --   --   --  27  --  41  --   --     < > = values in this interval not displayed.

## 2022-05-07 ENCOUNTER — APPOINTMENT (OUTPATIENT)
Dept: OCCUPATIONAL THERAPY | Facility: CLINIC | Age: 62
DRG: 637 | End: 2022-05-07
Attending: INTERNAL MEDICINE
Payer: MEDICARE

## 2022-05-07 LAB
ALBUMIN SERPL-MCNC: 2.3 G/DL (ref 3.4–5)
ALP SERPL-CCNC: 178 U/L (ref 40–150)
ALT SERPL W P-5'-P-CCNC: 16 U/L (ref 0–50)
ANION GAP SERPL CALCULATED.3IONS-SCNC: 7 MMOL/L (ref 3–14)
AST SERPL W P-5'-P-CCNC: 17 U/L (ref 0–45)
BILIRUB SERPL-MCNC: 0.2 MG/DL (ref 0.2–1.3)
BUN SERPL-MCNC: 10 MG/DL (ref 7–30)
CALCIUM SERPL-MCNC: 8.7 MG/DL (ref 8.5–10.1)
CHLORIDE BLD-SCNC: 116 MMOL/L (ref 94–109)
CO2 SERPL-SCNC: 22 MMOL/L (ref 20–32)
CREAT SERPL-MCNC: 1.27 MG/DL (ref 0.52–1.04)
ERYTHROCYTE [DISTWIDTH] IN BLOOD BY AUTOMATED COUNT: 14.5 % (ref 10–15)
GFR SERPL CREATININE-BSD FRML MDRD: 47 ML/MIN/1.73M2
GLUCOSE BLD-MCNC: 70 MG/DL (ref 70–99)
GLUCOSE BLDC GLUCOMTR-MCNC: 107 MG/DL (ref 70–99)
GLUCOSE BLDC GLUCOMTR-MCNC: 110 MG/DL (ref 70–99)
GLUCOSE BLDC GLUCOMTR-MCNC: 128 MG/DL (ref 70–99)
GLUCOSE BLDC GLUCOMTR-MCNC: 133 MG/DL (ref 70–99)
GLUCOSE BLDC GLUCOMTR-MCNC: 151 MG/DL (ref 70–99)
GLUCOSE BLDC GLUCOMTR-MCNC: 156 MG/DL (ref 70–99)
GLUCOSE BLDC GLUCOMTR-MCNC: 171 MG/DL (ref 70–99)
GLUCOSE BLDC GLUCOMTR-MCNC: 172 MG/DL (ref 70–99)
GLUCOSE BLDC GLUCOMTR-MCNC: 173 MG/DL (ref 70–99)
GLUCOSE BLDC GLUCOMTR-MCNC: 199 MG/DL (ref 70–99)
GLUCOSE BLDC GLUCOMTR-MCNC: 204 MG/DL (ref 70–99)
GLUCOSE BLDC GLUCOMTR-MCNC: 220 MG/DL (ref 70–99)
GLUCOSE BLDC GLUCOMTR-MCNC: 229 MG/DL (ref 70–99)
GLUCOSE BLDC GLUCOMTR-MCNC: 71 MG/DL (ref 70–99)
GLUCOSE BLDC GLUCOMTR-MCNC: 77 MG/DL (ref 70–99)
GLUCOSE BLDC GLUCOMTR-MCNC: 96 MG/DL (ref 70–99)
HCT VFR BLD AUTO: 32.3 % (ref 35–47)
HGB BLD-MCNC: 10.4 G/DL (ref 11.7–15.7)
MAGNESIUM SERPL-MCNC: 1.7 MG/DL (ref 1.6–2.3)
MCH RBC QN AUTO: 29.1 PG (ref 26.5–33)
MCHC RBC AUTO-ENTMCNC: 32.2 G/DL (ref 31.5–36.5)
MCV RBC AUTO: 91 FL (ref 78–100)
PHOSPHATE SERPL-MCNC: 2.2 MG/DL (ref 2.5–4.5)
PLATELET # BLD AUTO: 152 10E3/UL (ref 150–450)
POTASSIUM BLD-SCNC: 3.6 MMOL/L (ref 3.4–5.3)
PROT SERPL-MCNC: 5.4 G/DL (ref 6.8–8.8)
RBC # BLD AUTO: 3.57 10E6/UL (ref 3.8–5.2)
SODIUM SERPL-SCNC: 145 MMOL/L (ref 133–144)
WBC # BLD AUTO: 7.4 10E3/UL (ref 4–11)

## 2022-05-07 PROCEDURE — 82947 ASSAY GLUCOSE BLOOD QUANT: CPT | Performed by: INTERNAL MEDICINE

## 2022-05-07 PROCEDURE — 250N000011 HC RX IP 250 OP 636: Performed by: INTERNAL MEDICINE

## 2022-05-07 PROCEDURE — 99233 SBSQ HOSP IP/OBS HIGH 50: CPT | Performed by: INTERNAL MEDICINE

## 2022-05-07 PROCEDURE — 82310 ASSAY OF CALCIUM: CPT | Performed by: INTERNAL MEDICINE

## 2022-05-07 PROCEDURE — 250N000013 HC RX MED GY IP 250 OP 250 PS 637: Performed by: INTERNAL MEDICINE

## 2022-05-07 PROCEDURE — 250N000011 HC RX IP 250 OP 636: Performed by: STUDENT IN AN ORGANIZED HEALTH CARE EDUCATION/TRAINING PROGRAM

## 2022-05-07 PROCEDURE — 84100 ASSAY OF PHOSPHORUS: CPT | Performed by: INTERNAL MEDICINE

## 2022-05-07 PROCEDURE — 120N000003 HC R&B IMCU UMMC

## 2022-05-07 PROCEDURE — 97166 OT EVAL MOD COMPLEX 45 MIN: CPT | Mod: GO | Performed by: OCCUPATIONAL THERAPIST

## 2022-05-07 PROCEDURE — 250N000013 HC RX MED GY IP 250 OP 250 PS 637: Performed by: STUDENT IN AN ORGANIZED HEALTH CARE EDUCATION/TRAINING PROGRAM

## 2022-05-07 PROCEDURE — 99233 SBSQ HOSP IP/OBS HIGH 50: CPT | Performed by: NURSE PRACTITIONER

## 2022-05-07 PROCEDURE — 83735 ASSAY OF MAGNESIUM: CPT | Performed by: INTERNAL MEDICINE

## 2022-05-07 PROCEDURE — 250N000012 HC RX MED GY IP 250 OP 636 PS 637: Performed by: CLINICAL NURSE SPECIALIST

## 2022-05-07 PROCEDURE — 36415 COLL VENOUS BLD VENIPUNCTURE: CPT | Performed by: INTERNAL MEDICINE

## 2022-05-07 PROCEDURE — 258N000003 HC RX IP 258 OP 636: Performed by: INTERNAL MEDICINE

## 2022-05-07 PROCEDURE — 258N000001 HC RX 258: Performed by: INTERNAL MEDICINE

## 2022-05-07 PROCEDURE — 250N000012 HC RX MED GY IP 250 OP 636 PS 637: Performed by: NURSE PRACTITIONER

## 2022-05-07 PROCEDURE — 85027 COMPLETE CBC AUTOMATED: CPT | Performed by: INTERNAL MEDICINE

## 2022-05-07 PROCEDURE — 97530 THERAPEUTIC ACTIVITIES: CPT | Mod: GO | Performed by: OCCUPATIONAL THERAPIST

## 2022-05-07 PROCEDURE — 97535 SELF CARE MNGMENT TRAINING: CPT | Mod: GO | Performed by: OCCUPATIONAL THERAPIST

## 2022-05-07 PROCEDURE — 250N000009 HC RX 250: Performed by: INTERNAL MEDICINE

## 2022-05-07 RX ORDER — DEXTROSE MONOHYDRATE 100 MG/ML
INJECTION, SOLUTION INTRAVENOUS CONTINUOUS
Status: DISCONTINUED | OUTPATIENT
Start: 2022-05-07 | End: 2022-05-14 | Stop reason: HOSPADM

## 2022-05-07 RX ORDER — POTASSIUM CHLORIDE 7.45 MG/ML
10 INJECTION INTRAVENOUS ONCE
Status: DISCONTINUED | OUTPATIENT
Start: 2022-05-07 | End: 2022-05-07

## 2022-05-07 RX ADMIN — DOXAZOSIN 1 MG: 1 TABLET ORAL at 20:41

## 2022-05-07 RX ADMIN — INSULIN ASPART 1 UNITS: 100 INJECTION, SOLUTION INTRAVENOUS; SUBCUTANEOUS at 21:36

## 2022-05-07 RX ADMIN — CARVEDILOL 6.25 MG: 6.25 TABLET, FILM COATED ORAL at 07:44

## 2022-05-07 RX ADMIN — Medication 25 MCG: at 07:45

## 2022-05-07 RX ADMIN — SODIUM CHLORIDE 1000 ML: 4.5 INJECTION, SOLUTION INTRAVENOUS at 17:20

## 2022-05-07 RX ADMIN — DULOXETINE 20 MG: 20 CAPSULE, DELAYED RELEASE ORAL at 07:44

## 2022-05-07 RX ADMIN — ATORVASTATIN CALCIUM 40 MG: 40 TABLET, FILM COATED ORAL at 07:44

## 2022-05-07 RX ADMIN — CARVEDILOL 6.25 MG: 6.25 TABLET, FILM COATED ORAL at 17:20

## 2022-05-07 RX ADMIN — LEVETIRACETAM 500 MG: 5 INJECTION INTRAVENOUS at 20:33

## 2022-05-07 RX ADMIN — Medication 15 ML: at 07:45

## 2022-05-07 RX ADMIN — LEVOTHYROXINE SODIUM 88 MCG: 88 TABLET ORAL at 07:44

## 2022-05-07 RX ADMIN — CEFTRIAXONE SODIUM 1 G: 1 INJECTION, POWDER, FOR SOLUTION INTRAMUSCULAR; INTRAVENOUS at 08:05

## 2022-05-07 RX ADMIN — INSULIN GLARGINE 14 UNITS: 100 INJECTION, SOLUTION SUBCUTANEOUS at 13:06

## 2022-05-07 RX ADMIN — PREGABALIN 75 MG: 75 CAPSULE ORAL at 07:44

## 2022-05-07 RX ADMIN — CYANOCOBALAMIN TAB 500 MCG 1000 MCG: 500 TAB at 07:44

## 2022-05-07 RX ADMIN — HEPARIN SODIUM 5000 UNITS: 5000 INJECTION, SOLUTION INTRAVENOUS; SUBCUTANEOUS at 20:34

## 2022-05-07 RX ADMIN — THIAMINE HCL TAB 100 MG 100 MG: 100 TAB at 07:44

## 2022-05-07 RX ADMIN — DEXTROSE MONOHYDRATE: 100 INJECTION, SOLUTION INTRAVENOUS at 08:58

## 2022-05-07 RX ADMIN — HEPARIN SODIUM 5000 UNITS: 5000 INJECTION, SOLUTION INTRAVENOUS; SUBCUTANEOUS at 07:49

## 2022-05-07 RX ADMIN — INSULIN ASPART 1 UNITS: 100 INJECTION, SOLUTION INTRAVENOUS; SUBCUTANEOUS at 10:19

## 2022-05-07 RX ADMIN — PREGABALIN 75 MG: 75 CAPSULE ORAL at 20:35

## 2022-05-07 RX ADMIN — LEVETIRACETAM 500 MG: 5 INJECTION INTRAVENOUS at 08:20

## 2022-05-07 RX ADMIN — POTASSIUM PHOSPHATE, MONOBASIC AND POTASSIUM PHOSPHATE, DIBASIC 9 MMOL: 224; 236 INJECTION, SOLUTION, CONCENTRATE INTRAVENOUS at 11:31

## 2022-05-07 RX ADMIN — ASPIRIN 81 MG CHEWABLE TABLET 81 MG: 81 TABLET CHEWABLE at 07:44

## 2022-05-07 ASSESSMENT — ACTIVITIES OF DAILY LIVING (ADL)
ADLS_ACUITY_SCORE: 23
ADLS_ACUITY_SCORE: 21
ADLS_ACUITY_SCORE: 21
ADLS_ACUITY_SCORE: 23
ADLS_ACUITY_SCORE: 23
ADLS_ACUITY_SCORE: 21
ADLS_ACUITY_SCORE: 23
ADLS_ACUITY_SCORE: 23
ADLS_ACUITY_SCORE: 21
ADLS_ACUITY_SCORE: 23
ADLS_ACUITY_SCORE: 21
ADLS_ACUITY_SCORE: 23
ADLS_ACUITY_SCORE: 23
ADLS_ACUITY_SCORE: 21

## 2022-05-07 NOTE — PROGRESS NOTES
IP Diabetes Management  Daily Note           Assessment and Plan:   HPI: Isabell Matthews is a 61 year old female, with h/o  seizure disorder, type 2 diabetes treated as type 1, CKD3, HTN, HLD, CAD s/p PCI, hypothyroidism, vascular dementia, multiple prior ischemic strokes and recent acute ischemic stroke of right frontoparietal punctate stroke due to small-vessel occlusion (was hospitalized in 4/5/22), presenting with DKA in setting of infection and withheld insulin.    Assessment:   1)Type 2 Diabetes Mellitus treated as type 1, uncontrolled (A1c 8.9), presenting with DKA precipitated by lack of insulin and UTI  2) TF induced hyperglycemia    Plan:    -start Lantus 14 units daily today, stop IV insulin gtt 2 hours after this is given   -continue D10 at 50 ml/hour, may titrate down when PO intake improving   -start novolog medium resistance sliding scale insulin AC, HS   -continue Novolog 1:15g CHO coverage with meals and snacks/supplements added, when pt allowed to eat   -BG monitoring Q1-2 hours on IV insulin --->AC, HS, 0200   -hypoglycemia protocol   -carb counting protocol   -diabetes education needs will be assessed closer to discharge.     Outpatient follow up: TBD (PCP vs. [facility])    Plan discussed with patient, bedside RN, and paged primary team.       Interval History and Assessment: interval glucose trend reviewed:       BG variable overnight with no D10 infusing.  Gtt off at 0200 for BG 71. Then BG to 70s this AM and D10 at 50 ml/hour started.   PTA she is on Lantus 14-20 units daily.  Wt. Based dosing would be lantus 14 units daily.  She is currently eating Bfast (not sure how much she will eat).  No further plans for TF today (patient pulled [place] tube out twice yesterday), will let patient eat and do calorie counts.  Will transition off IV insulin gtt today.      Patient denies nausea, abdominal pain.     Historical 5/5:   She states that the patient has not been eating well at all since  April.  She had been reducing her Lantus doses accordingly, but held the evening Lantus dose on Monday night.  BG then scarlett and patient presented in DKA.  She reports that her mom started vomiting when BG scarlett.        Current nutritional intake and type: Orders Placed This Encounter      Easy to Chew Diet (level 7)    Steroid planning: none    PTA Diabetes Regimen:   Lantus 14-20 units at bedtime (per daughter's discretion)  humalog 1-3 units with meals    Discharge Planning: TBD           Diabetes History:   Type of Diabetes: Type 1 Diabetes Mellitus, TPN/Enteral Feeding Induced Hyperglycemia  Lab Results   Component Value Date    A1C 8.9 05/03/2022    A1C 8.8 04/05/2022    A1C 8.2 08/02/2021    A1C 7.8 06/21/2021    A1C 11.7 09/27/2020              Review of Systems:     The Review of Systems is negative other than noted in the Interval History.           Medications:     Current Facility-Administered Medications   Medication      SYNTHROID **BRAND NAME ONLY** tablet 88 mcg     aspirin (ASA) chewable tablet 81 mg     atorvastatin (LIPITOR) tablet 40 mg     carvedilol (COREG) tablet 6.25 mg     cefTRIAXone (ROCEPHIN) 1 g vial to attach to  mL bag for ADULTS or NS 50 mL bag for PEDS     cyanocobalamin (VITAMIN B-12) tablet 1,000 mcg     dextrose 10% infusion     dextrose 10% infusion     dextrose 50 % injection 25-50 mL     glucose gel 15-30 g    Or     dextrose 50 % injection 25-50 mL    Or     glucagon injection 1 mg     doxazosin (CARDURA) tablet 1 mg     DULoxetine (CYMBALTA) DR capsule 20 mg     heparin ANTICOAGULANT injection 5,000 Units     hydrALAZINE (APRESOLINE) injection 10 mg     insulin 1 unit/mL in saline (NovoLIN, HumuLIN Regular) drip - ADULT IV Infusion     insulin aspart (NovoLOG) injection (RAPID ACTING)     insulin aspart (NovoLOG) injection (RAPID ACTING)     levETIRAcetam (KEPPRA) intermittent infusion 500 mg     [Held by provider] levETIRAcetam (KEPPRA) tablet 500 mg     lidocaine (LMX4)  cream     lidocaine 1 % 0.1-1 mL     melatonin tablet 1 mg     multivitamins w/minerals liquid 15 mL     [Held by provider] NIFEdipine ER (ADALAT CC) 24 hr tablet 60 mg     ondansetron (ZOFRAN-ODT) ODT tab 4 mg    Or     ondansetron (ZOFRAN) injection 4 mg     pregabalin (LYRICA) capsule 75 mg     senna-docusate (SENOKOT-S/PERICOLACE) 8.6-50 MG per tablet 1 tablet    Or     senna-docusate (SENOKOT-S/PERICOLACE) 8.6-50 MG per tablet 2 tablet     sodium chloride (PF) 0.9% PF flush 3 mL     sodium chloride (PF) 0.9% PF flush 3 mL     sodium chloride (PF) 0.9% PF flush 3 mL     thiamine (B-1) tablet 100 mg     Vitamin D3 (CHOLECALCIFEROL) tablet 25 mcg            Physical Exam:    /69 (BP Location: Right arm)   Pulse 107   Temp (!) 96.4  F (35.8  C) (Axillary)   Resp 18   Wt 59.5 kg (131 lb 2.8 oz)   SpO2 96%   BMI 23.24 kg/m    General: sitting up in bed.   HEENT: normocephalic, atraumatic. Oral mucous membranes dry.  Lungs: unlabored respiration, no cough  ABD: rounded, nondistended  Skin: warm and dry, no obvious lesions  MSK:  moves all extremities  Mental status:  alert to person. Answers some questions nonsensically  Psych:  calm interaction with provider.             Data:     Recent Labs   Lab 05/07/22  0646 05/07/22  0548 05/07/22  0448 05/07/22  0343 05/07/22  0239 05/07/22  0140   * 229* 199* 96 71 107*     Lab Results   Component Value Date    WBC 4.8 05/06/2022    WBC 5.8 05/05/2022    WBC 8.1 05/04/2022    HGB 11.5 (L) 05/06/2022    HGB 10.8 (L) 05/05/2022    HGB 11.2 (L) 05/04/2022    HCT 36.3 05/06/2022    HCT 34.2 (L) 05/05/2022    HCT 35.3 05/04/2022    MCV 90 05/06/2022    MCV 90 05/05/2022    MCV 90 05/04/2022     (L) 05/06/2022     05/05/2022     05/04/2022     Lab Results   Component Value Date     05/06/2022     05/05/2022     05/05/2022    POTASSIUM 3.6 05/06/2022    POTASSIUM 3.5 05/05/2022    POTASSIUM 3.6 05/05/2022    CHLORIDE 114 (H)  05/06/2022    CHLORIDE 112 (H) 05/05/2022    CHLORIDE 113 (H) 05/05/2022    CO2 21 05/06/2022    CO2 22 05/05/2022    CO2 19 (L) 05/05/2022     (H) 05/07/2022     (H) 05/07/2022     (H) 05/07/2022     Lab Results   Component Value Date    BUN 8 05/06/2022    BUN 12 05/05/2022    BUN 13 05/05/2022     Lab Results   Component Value Date    TSH 0.63 08/02/2021    TSH 0.65 09/27/2020     Lab Results   Component Value Date    AST 34 05/06/2022    AST 27 05/05/2022    AST 41 05/04/2022    ALT 19 05/06/2022    ALT 16 05/05/2022    ALT 15 05/04/2022    ALKPHOS 161 (H) 05/06/2022    ALKPHOS 129 05/05/2022    ALKPHOS 132 05/04/2022         35 minutes spent on the date of the encounter doing chart review, history and exam, documentation and further activities per the note      Over 50% of my time on the unit was spent counseling the patient and/or coordinating care regarding acute hyperglycemia management.  See note for details.    To contact Endocrine Diabetes service:   From 8AM-4PM: page inpatient diabetes provider that is following the patient  For questions or updates from 4PM-8AM: page the diabetes job code for on call fellow: [phone]    PEGGY Palafox, CNP  Inpatient Diabetes Management Service  Pager [phone]

## 2022-05-07 NOTE — PROGRESS NOTES
05/07/22 1420   Quick Adds   Type of Visit Initial Occupational Therapy Evaluation   Living Environment   People in Home child(tiffany), adult   Current Living Arrangements apartment   Home Accessibility no concerns   Transportation Anticipated family or friend will provide   Living Environment Comments All prior information gathered from chart. Pt not answering questions or inconsistant.  Pt lives in apartment with her daughter. Pt has a walk in shower with a built in seat.   Self-Care   Usual Activity Tolerance moderate   Current Activity Tolerance poor   Regular Exercise No   Equipment Currently Used at Home wheelchair, manual;walker, rolling;shower chair;grab bar, toilet;grab bar, tub/shower   Activity/Exercise/Self-Care Comment Per notes from 4/6: Per daughter report they have a 4WW but does not use it, daughter reports she is there most of the day and is usually with the patient when walking around, they have wc, had home health after last stroke last summer.   Instrumental Activities of Daily Living (IADL)   IADL Comments dtr does IADLs   General Information   Onset of Illness/Injury or Date of Surgery 05/03/22   Referring Physician Dr Terry rodriguez   Patient/Family Therapy Goal Statement (OT) none stated, no family present   Additional Occupational Profile Info/Pertinent History of Current Problem 61 year old female, with h/o  seizure disorder, type 2 diabetes treated as type 1, CKD3, HTN, HLD, CAD s/p PCI, hypothyroidism, vascular dementia, multiple prior ischemic strokes and recent acute ischemic stroke of right frontoparietal punctate stroke due to small-vessel occlusion (was hospitalized in 4/5/22), presenting with DKA in setting of infection and withheld insulin.   Existing Precautions/Restrictions swallowing;fall   General Observations and Info activity up w A   Cognitive Status Examination   Orientation Status person   Affect/Mental Status (Cognitive) confused;low arousal/lethargic   Follows Commands  delayed response/completion   Cognitive Status Comments below baseline   Visual Perception   Impact of Vision Impairment on Function (Vision) FOZIA   Sensory   Sensory Comments FOZIA   Pain Assessment   Patient Currently in Pain Yes, see Vital Sign flowsheet   Integumentary/Edema   Integumentary/Edema no deficits were identifed   Posture   Posture Comments needing assist for EOB, tipping to R   Range of Motion Comprehensive   Comment, General Range of Motion NT poor command follow   Strength Comprehensive (MMT)   Comment, General Manual Muscle Testing (MMT) Assessment poor command follow NT   Coordination   Fine Motor Coordination deficits noted   Bed Mobility   Comment (Bed Mobility) Max A x1-2   Transfers   Transfer Comments Mod to max A x2   Balance   Balance Comments poor balance at EOB and in standing   Activities of Daily Living   BADL Assessment/Intervention lower body dressing;grooming;feeding;toileting   Upper Body Dressing Assessment/Training   [place] Level (Upper Body Dressing) maximum assist (25% patient effort)   Lower Body Dressing Assessment/Training   [place] Level (Lower Body Dressing) dependent (less than 25% patient effort)   Grooming Assessment/Training   [place] Level (Grooming) maximum assist (25% patient effort)   Eating/Self Feeding   [place] Level (Feeding) maximum assist (25% patient effort);verbal cues   Comment, (Feeding) needing cues to swallow   Toileting   [place] Level (Toileting) dependent (less than 25% patient effort)   Clinical Impression   Criteria for Skilled Therapeutic Interventions Met (OT) Yes, treatment indicated   OT Diagnosis Decreased IND w ADLs   Influenced by the following impairments weakness, AMS   OT Problem List-Impairments impacting ADL problems related to;activity tolerance impaired;balance;cognition;coordination;flexibility;mobility;range of motion (ROM);strength;pain;postural control;vision;communication;eating/swallowing   Assessment of  Occupational Performance 5 or more Performance Deficits   Identified Performance Deficits all ADLS and IADLs are affected   Planned Therapy Interventions (OT) ADL retraining;IADL retraining;bed mobility training;ROM;strengthening;stretching;transfer training;home program guidelines;progressive activity/exercise;cognition   Clinical Decision Making Complexity (OT) moderate complexity   Risk & Benefits of therapy have been explained evaluation/treatment results reviewed;care plan/treatment goals reviewed;risks/benefits reviewed;current/potential barriers reviewed;participants voiced agreement with care plan;participants included;patient   OT Discharge Planning   OT Discharge Recommendation (DC Rec) Transitional Care Facility   OT Rationale for DC Rec Pt is below baseline function at this time. Pt will benefit from continued therapy to regain and maximize functional independence and safety.    OT Brief overview of current status Mod to max A x2 bed mobility and pivot to chair, poor command follow   Total Evaluation Time (Minutes)   Total Evaluation Time (Minutes) 5   OT Goals   Therapy Frequency (OT) 5 times/wk   OT Predicted Duration/Target Date for Goal Attainment 06/03/22   OT Goals Hygiene/Grooming;Upper Body Dressing;Lower Body Dressing;Transfers;Toilet Transfer/Toileting;Cognition   OT: Hygiene/Grooming modified independent;within precautions;while standing   OT: Upper Body Dressing Independent   OT: Lower Body Dressing Modified independent   OT: Transfer Minimal assist;with assistive device  (shower transfer)   OT: Toilet Transfer/Toileting Modified independent;toilet transfer;cleaning and garment management   OT: Cognitive Patient/caregiver will verbalize understanding of cognitive assessment results/recommendations as needed for safe discharge planning

## 2022-05-07 NOTE — PLAN OF CARE
Major Shift Events:  Orientation waxes and wanes, at times d/o to time, situation and place, reorient prn. VSS, no c/o pain. Incontinent of bowel and bladder. On insulin gtt w/hourly blood glucose checks. Poor appetite.  Plan: continue w/hourly blood glucose checks and adjust insulin gtt per order.   For vital signs and complete assessments, please see documentation flowsheets.       Goal Outcome Evaluation:    Plan of Care Reviewed With: patient     Overall Patient Progress: no change    Outcome Evaluation: blood glucose low of 44 and high of 229 on insulin gtt, hourly checks.

## 2022-05-07 NOTE — PROGRESS NOTES
[facility]    Medicine Progress Note - Hospitalist Service, GOLD TEAM 10    Date of Admission:  5/3/2022    Assessment & Plan        Isabell Matthews is a 61 year old female, with h/o acute ischemic stroke of right frontopperital punctate stroke due to small-vessel occlusion (was hospitalized in 4/5/22), multiple lacunar and punctate ischemic infarcts and multifocal moderate to severe stenosis in multiple vascular territories, seizure disorder, DMII, CKD3, HTN, HLD, CAD s/p PCI, hypothyroidism, vascular dementia, presenting with AMS/aphasia, found to have hyperglycemia 2/2 DKA and elevated LA.    Changes today   Diet advanced  Placed on D10 for hypoglycemia which has since been held after insulin drip stopped.   Speech eval  recommendations appreciated   Appreciate Endocrine   Kidney fucntion stable. With hypernatremia will give 1 L bolus of 1/2 normal saline  Ceftriaxone for aerococcus- improved   Restart D10 if not tolerating diet and hypoglycemic while on insulin drip           # Hyperglycemia 2/2 DKA  # UTI  # H/o DMII (No DEMETRIA ab/C-peptide)  Her glucose was 686, ketones 5.4, hgba1c 8.9. Had DKA in 10/2020. Per Endo, suspect insulin dependent DMII.     Etiology: may be due to UTI: UA showing bacteria and WB 15 with small LE. Per neuro note, it states that daughter/care taker has been dealing with respirator illness recently. It may also be due to skipped insulin evening 5/1, daytime 5/2 due to decreased appetite and concern for precipitating hypoglycemia per caretaker/daughter.  - s/p Zosyn and Vanc in ED, continued on medicine floor, may continue per primary team, ucx pending  - holding pta glargine 18 U at bedtime, pta lispro mealtime SSI for now  - pta pregabalin 75 mg bid (held)  - DKA proctocol ordered  - Endocrine consulted  - BMP, phos q2h ordered  - phos, BMP, Mg, CBC, abg ordered  - Bcx x1 pending from ED  - EKG ordered  - negative  covid, influenza, respiratory  panel     # AGMA with uncompensated respiratory acidosis  # Hyperkalemia  # Elevated lactate  Resolved      AGMA due to lactate, ketoacidosis 2/2 DKA. Will improve with fluid resuscitation and insulin. Hyperkalemia 2/2 DKA/AGMA.    # Acute on Chronic kidney disease  Cr 1.84 on admission. Cr baseline 1.2-1.45. 2/2 volume depletion 2/2 DKA.       # Acute metabolic toxic encephalopathy  # Aphasia  # Concern for acute stroke  Of note in 4/2022, pt presented with 1 day of difficulty waking and 45 mins of L eye vision loss.  IV thrombolysis was not given  due to unclear or unfavorable risk-benefit profile for extended window thrombolysis beyond the conventional 4.5 hour time window. Etiology was thought to be small vessel disease.  Was recommended asa 81 mg daily with hgba1c goal <7.     Upon admission to the ED, there was concern for aphagia and stroke code was called, CT head showed no acute intracranial hemorrhages, showed small chronic infarcts. CTA head showed no intracranial arterial large vessel occlusion, and CT neck showed left carotid bifurcation moderate stenosis, atherosclerotic plaque results in less than 50% stenosis in the right carotid bulb and proximal cervical ICA, chronic right vertebral artery and proximal V1 with severe stenosis, but overall no significant interval change compared to CTA head and neck 04/10/2022. Not given TPA due to history of intracranial hemorrhage.      Etiology ddx: During stroke code, CT and CTA head imaging did not demonstrate clear new occlusive lesion or developing area of hypodensity concerning for stroke as primary etiology. Most likely toxic metabolic from DKA vs. Infection (respiratory vs UTI).  - pta atorvastatin 40 mg daily  - pta asa 81 mg daily  - q4h neuro checks  - consider MRI brain per primary team  - pending influenza, covid, respiratory panel     --Chronic diseases--  **Will place essential meds to IV instead of PTA PO since pt has AMS and is not able to do PO,  not following commands**     #Seizure disorder  - PTA Keppra 500 mg BID (IV)     #CKD3  - BMP, phos q2h    #HTN  On admission /77.  - PTA coreg 6.25 mg, and nifedipine 60 mg daily (instead of this placed PRN hydralzine 10 mg q6h)     #HLD  - PTA atrova 40 mg daily     #CAD s/p PCI  - PTA coreg and nifedipine 60 mg daily (instead of this placed PRN hydralzine 10 mg q6h, fpr SBP >170)     #Hypothyroidism  - PTA synthroid 88 mcg daily     #Vascular dementia  - Delirium precautions     # MDD  - pta duloxetine 20 mg daily     # Urinary retention  - pta doxazosin          Diet: Adult Formula Drip Feeding: Continuous Osmolite 1.5; Nasogastric tube; Goal Rate: 55; mL/hr; Medication - Feeding Tube Flush Frequency: At least 15-30 mL water before and after medication administration and with tube clogging; Amount to Send (Nutr...  Easy to Chew Diet (level 7)  Calorie Counts    DVT Prophylaxis: Heparin SQ  Parker Catheter: Not present  Central Lines: None  Cardiac Monitoring: None  Code Status: Full Code      Disposition Plan   Expected Discharge: 05/11/2022   Anticipated discharge location:  Awaiting care coordination huddle  Delays:            The patient's care was discussed with the Patient.    Paul Kulkarni MD  Hospitalist Service, [address]  Securely message with the Vocera Web Console (learn more here)  Text page via [facility] Paging/Directory   Please see signed in provider for up to date coverage information      Clinically Significant Risk Factors Present on Admission                # Moderate Malnutrition: based on nutrition assessment     ______________________________________________________________________    Interval History   Responding to commands however slow to respond   No fever or chills  No bladder discomfort  No chest pain  No SOB  Mild left upper quadrant discomfort with improvement after BM    Data reviewed today: I reviewed all medications,  new labs and imaging results over the last 24 hours. I personally reviewed no images or EKG's today.    Physical Exam   Vital Signs: Temp: 98  F (36.7  C) Temp src: Oral BP: 130/58 Pulse: 95   Resp: 18 SpO2: 93 % O2 Device: None (Room air)    Weight: 131 lbs 2.78 oz  Physical Exam  Constitutional:       Appearance: She is not ill-appearing or toxic-appearing.   HENT:      Head: Normocephalic.      Mouth/Throat:      Mouth: Mucous membranes are moist.   Cardiovascular:      Rate and Rhythm: Normal rate and regular rhythm.      Heart sounds: No murmur heard.    No friction rub. No gallop.   Pulmonary:      Effort: Pulmonary effort is normal. No respiratory distress.      Breath sounds: No stridor. No wheezing.   Abdominal:      General: Abdomen is flat. There is no distension.      Palpations: There is no mass.      Tenderness: There is no abdominal tenderness.   Musculoskeletal:      Right lower leg: No edema.      Left lower leg: No edema.   Neurological:      General: No focal deficit present.      Mental Status: She is oriented to person, place, and time.      Cranial Nerves: No cranial nerve deficit.      Sensory: No sensory deficit.      Motor: No weakness.      Comments: Upper ext appears spastic    Psychiatric:         Mood and Affect: Mood normal.         Behavior: Behavior normal.         Data   Recent Labs   Lab 05/07/22  1305 05/07/22  1232 05/07/22  1134 05/07/22  0902 05/07/22  0850 05/06/22  0911 05/06/22  0857 05/05/22  2140 05/05/22  2051 05/05/22  1204 05/05/22  1116 05/05/22  0857 05/05/22  0837 05/03/22  0235 05/03/22  0229   WBC  --   --   --   --  7.4  --  4.8  --   --   --   --   --  5.8   < >  --    HGB  --   --   --   --  10.4*  --  11.5*  --   --   --   --   --  10.8*   < >  --    MCV  --   --   --   --  91  --  90  --   --   --   --   --  90   < >  --    PLT  --   --   --   --  152  --  121*  --   --   --   --   --  150   < >  --    INR  --   --   --   --   --   --   --   --   --   --   --    --   --   --  1.3*   NA  --   --   --   --  145*  --  142  --   --   --  141  --  142   < >  --    POTASSIUM  --   --   --   --  3.6  --  3.6  --  3.5   < > 3.3*  --  3.1*   < >  --    CHLORIDE  --   --   --   --  116*  --  114*  --   --   --  112*  --  113*   < >  --    CO2  --   --   --   --  22  --  21  --   --   --  22  --  19*   < >  --    BUN  --   --   --   --  10  --  8  --   --   --  12  --  13   < >  --    CR  --   --   --   --  1.27*  --  1.18*  --   --   --  1.46*  --  1.39*   < >  --    ANIONGAP  --   --   --   --  7  --  7  --   --   --  7  --  10   < >  --    VERONICA  --   --   --   --  8.7  --  8.6  --   --   --  8.1*  --  8.1*   < >  --    * 171* 173*   < > 70   < > 82   < >  --    < > 126*   < > 128*   < >  --    ALBUMIN  --   --   --   --  2.3*  --  2.5*  --   --   --   --   --  2.3*   < >  --    PROTTOTAL  --   --   --   --  5.4*  --  5.9*  --   --   --   --   --  5.0*   < >  --    BILITOTAL  --   --   --   --  0.2  --  0.3  --   --   --   --   --  0.4   < >  --    ALKPHOS  --   --   --   --  178*  --  161*  --   --   --   --   --  129   < >  --    ALT  --   --   --   --  16  --  19  --   --   --   --   --  16   < >  --    AST  --   --   --   --  17  --  34  --   --   --   --   --  27   < >  --     < > = values in this interval not displayed.

## 2022-05-08 ENCOUNTER — APPOINTMENT (OUTPATIENT)
Dept: PHYSICAL THERAPY | Facility: CLINIC | Age: 62
DRG: 637 | End: 2022-05-08
Attending: INTERNAL MEDICINE
Payer: MEDICARE

## 2022-05-08 LAB
ALBUMIN SERPL-MCNC: 2.2 G/DL (ref 3.4–5)
ALP SERPL-CCNC: 159 U/L (ref 40–150)
ALT SERPL W P-5'-P-CCNC: 18 U/L (ref 0–50)
ANION GAP SERPL CALCULATED.3IONS-SCNC: 8 MMOL/L (ref 3–14)
AST SERPL W P-5'-P-CCNC: 22 U/L (ref 0–45)
BACTERIA BLD CULT: NO GROWTH
BILIRUB SERPL-MCNC: 0.4 MG/DL (ref 0.2–1.3)
BUN SERPL-MCNC: 13 MG/DL (ref 7–30)
CALCIUM SERPL-MCNC: 9 MG/DL (ref 8.5–10.1)
CHLORIDE BLD-SCNC: 114 MMOL/L (ref 94–109)
CO2 SERPL-SCNC: 23 MMOL/L (ref 20–32)
CREAT SERPL-MCNC: 1.2 MG/DL (ref 0.52–1.04)
ERYTHROCYTE [DISTWIDTH] IN BLOOD BY AUTOMATED COUNT: 14.7 % (ref 10–15)
GFR SERPL CREATININE-BSD FRML MDRD: 51 ML/MIN/1.73M2
GLUCOSE BLD-MCNC: 129 MG/DL (ref 70–99)
GLUCOSE BLDC GLUCOMTR-MCNC: 113 MG/DL (ref 70–99)
GLUCOSE BLDC GLUCOMTR-MCNC: 116 MG/DL (ref 70–99)
GLUCOSE BLDC GLUCOMTR-MCNC: 157 MG/DL (ref 70–99)
GLUCOSE BLDC GLUCOMTR-MCNC: 221 MG/DL (ref 70–99)
GLUCOSE BLDC GLUCOMTR-MCNC: 226 MG/DL (ref 70–99)
HCT VFR BLD AUTO: 30.2 % (ref 35–47)
HGB BLD-MCNC: 9.6 G/DL (ref 11.7–15.7)
MAGNESIUM SERPL-MCNC: 1.9 MG/DL (ref 1.6–2.3)
MCH RBC QN AUTO: 28.8 PG (ref 26.5–33)
MCHC RBC AUTO-ENTMCNC: 31.8 G/DL (ref 31.5–36.5)
MCV RBC AUTO: 91 FL (ref 78–100)
PHOSPHATE SERPL-MCNC: 3.4 MG/DL (ref 2.5–4.5)
PLATELET # BLD AUTO: 129 10E3/UL (ref 150–450)
POTASSIUM BLD-SCNC: 3.8 MMOL/L (ref 3.4–5.3)
PROT SERPL-MCNC: 5.1 G/DL (ref 6.8–8.8)
RBC # BLD AUTO: 3.33 10E6/UL (ref 3.8–5.2)
SODIUM SERPL-SCNC: 145 MMOL/L (ref 133–144)
WBC # BLD AUTO: 4.3 10E3/UL (ref 4–11)

## 2022-05-08 PROCEDURE — 250N000011 HC RX IP 250 OP 636: Performed by: STUDENT IN AN ORGANIZED HEALTH CARE EDUCATION/TRAINING PROGRAM

## 2022-05-08 PROCEDURE — 120N000002 HC R&B MED SURG/OB UMMC

## 2022-05-08 PROCEDURE — 250N000013 HC RX MED GY IP 250 OP 250 PS 637: Performed by: STUDENT IN AN ORGANIZED HEALTH CARE EDUCATION/TRAINING PROGRAM

## 2022-05-08 PROCEDURE — 99232 SBSQ HOSP IP/OBS MODERATE 35: CPT | Performed by: INTERNAL MEDICINE

## 2022-05-08 PROCEDURE — 85027 COMPLETE CBC AUTOMATED: CPT | Performed by: INTERNAL MEDICINE

## 2022-05-08 PROCEDURE — 99233 SBSQ HOSP IP/OBS HIGH 50: CPT | Performed by: NURSE PRACTITIONER

## 2022-05-08 PROCEDURE — 97162 PT EVAL MOD COMPLEX 30 MIN: CPT | Mod: GP

## 2022-05-08 PROCEDURE — 97530 THERAPEUTIC ACTIVITIES: CPT | Mod: GP

## 2022-05-08 PROCEDURE — 250N000013 HC RX MED GY IP 250 OP 250 PS 637: Performed by: INTERNAL MEDICINE

## 2022-05-08 PROCEDURE — 250N000011 HC RX IP 250 OP 636: Performed by: INTERNAL MEDICINE

## 2022-05-08 PROCEDURE — 80053 COMPREHEN METABOLIC PANEL: CPT | Performed by: INTERNAL MEDICINE

## 2022-05-08 PROCEDURE — 83735 ASSAY OF MAGNESIUM: CPT | Performed by: INTERNAL MEDICINE

## 2022-05-08 PROCEDURE — 36415 COLL VENOUS BLD VENIPUNCTURE: CPT | Performed by: INTERNAL MEDICINE

## 2022-05-08 PROCEDURE — 84100 ASSAY OF PHOSPHORUS: CPT | Performed by: INTERNAL MEDICINE

## 2022-05-08 RX ORDER — ESCITALOPRAM OXALATE 10 MG/1
10 TABLET ORAL DAILY
Status: DISCONTINUED | OUTPATIENT
Start: 2022-05-08 | End: 2022-05-11

## 2022-05-08 RX ORDER — NIFEDIPINE 30 MG/1
30 TABLET, EXTENDED RELEASE ORAL DAILY
Status: DISCONTINUED | OUTPATIENT
Start: 2022-05-08 | End: 2022-05-11

## 2022-05-08 RX ADMIN — Medication 25 MCG: at 08:04

## 2022-05-08 RX ADMIN — HEPARIN SODIUM 5000 UNITS: 5000 INJECTION, SOLUTION INTRAVENOUS; SUBCUTANEOUS at 08:04

## 2022-05-08 RX ADMIN — LEVOTHYROXINE SODIUM 88 MCG: 88 TABLET ORAL at 08:04

## 2022-05-08 RX ADMIN — HEPARIN SODIUM 5000 UNITS: 5000 INJECTION, SOLUTION INTRAVENOUS; SUBCUTANEOUS at 21:24

## 2022-05-08 RX ADMIN — CYANOCOBALAMIN TAB 500 MCG 1000 MCG: 500 TAB at 08:03

## 2022-05-08 RX ADMIN — DOXAZOSIN 1 MG: 1 TABLET ORAL at 21:24

## 2022-05-08 RX ADMIN — INSULIN ASPART 2 UNITS: 100 INJECTION, SOLUTION INTRAVENOUS; SUBCUTANEOUS at 14:37

## 2022-05-08 RX ADMIN — INSULIN ASPART 3 UNITS: 100 INJECTION, SOLUTION INTRAVENOUS; SUBCUTANEOUS at 21:21

## 2022-05-08 RX ADMIN — CEFTRIAXONE SODIUM 1 G: 1 INJECTION, POWDER, FOR SOLUTION INTRAMUSCULAR; INTRAVENOUS at 08:05

## 2022-05-08 RX ADMIN — INSULIN GLARGINE 14 UNITS: 100 INJECTION, SOLUTION SUBCUTANEOUS at 10:08

## 2022-05-08 RX ADMIN — DULOXETINE 20 MG: 20 CAPSULE, DELAYED RELEASE ORAL at 08:03

## 2022-05-08 RX ADMIN — PREGABALIN 75 MG: 75 CAPSULE ORAL at 08:04

## 2022-05-08 RX ADMIN — Medication 15 ML: at 08:02

## 2022-05-08 RX ADMIN — LEVETIRACETAM 500 MG: 5 INJECTION INTRAVENOUS at 08:05

## 2022-05-08 RX ADMIN — NIFEDIPINE 30 MG: 30 TABLET, FILM COATED, EXTENDED RELEASE ORAL at 08:49

## 2022-05-08 RX ADMIN — INSULIN ASPART 2 UNITS: 100 INJECTION, SOLUTION INTRAVENOUS; SUBCUTANEOUS at 10:25

## 2022-05-08 RX ADMIN — LEVETIRACETAM 500 MG: 5 INJECTION INTRAVENOUS at 21:48

## 2022-05-08 RX ADMIN — CARVEDILOL 6.25 MG: 6.25 TABLET, FILM COATED ORAL at 19:34

## 2022-05-08 RX ADMIN — CARVEDILOL 6.25 MG: 6.25 TABLET, FILM COATED ORAL at 08:03

## 2022-05-08 RX ADMIN — PREGABALIN 75 MG: 75 CAPSULE ORAL at 21:24

## 2022-05-08 RX ADMIN — ASPIRIN 81 MG CHEWABLE TABLET 81 MG: 81 TABLET CHEWABLE at 08:03

## 2022-05-08 RX ADMIN — THIAMINE HCL TAB 100 MG 100 MG: 100 TAB at 08:03

## 2022-05-08 RX ADMIN — ATORVASTATIN CALCIUM 40 MG: 40 TABLET, FILM COATED ORAL at 08:04

## 2022-05-08 ASSESSMENT — ACTIVITIES OF DAILY LIVING (ADL)
ADLS_ACUITY_SCORE: 21
ADLS_ACUITY_SCORE: 23
ADLS_ACUITY_SCORE: 21
ADLS_ACUITY_SCORE: 23
ADLS_ACUITY_SCORE: 21

## 2022-05-08 NOTE — PROVIDER NOTIFICATION
NURSING PROGRESS NOTE  Provider Notification      Gold 10 crosscover notified via [employer] Smart Web Paging on May 8, 2022 at 0126 (time).    Critical lab notification?:  No      Reason for notification:    6D rm [address] Matthews, R    Pt discovered to have fallen out of bed. Pt denies hitting head. BP - 169/74, HR - 80s. C/o knee pain from skinning knees as she went down.  Thank you!  Megan #[id number]       Response/Follow-up:  Awaiting response and will follow up accordingly.        Megan Flores RN  .................................................... May 8, 2022   1:27 AM  [facility] ([facility]): [place]  Stepdown ICU (Unit 6D)

## 2022-05-08 NOTE — PLAN OF CARE
Major Shift Events:  Pt disoriented x2-3 overnight, requring freqent redirection and reorienatation.  Pt had unwitnessed fall overnight, writer rounding on patient at approx 0115 saw pt out of bed kneeling on floor next to bed. Pt reports getting up to go to the bathroom and falling onto knees - pt has scraped knees. VSS before and after fall, neuros unchanged. Pt was promptly placed on bed alarm, call light education was reinforced with patient.   Pt incontinent of bowel and bladder. A1-2 with gait belt/walker to ambulate.   Plan: notify team with any acute changes  For vital signs and complete assessments, please see documentation flowsheets.      Goal Outcome Evaluation:    Plan of Care Reviewed With: patient     Overall Patient Progress: no change    Outcome Evaluation: Pt waxes and wanes with orientation, only alert to self this shift. Pt had an unwitnessed fall this shifr, neuros remained the same after and VSS, crosscover assessed at bedside.

## 2022-05-08 NOTE — SUMMARY OF CARE
Reason for transfer: no longer needing higher level of care  Transferred from: 6D  Report received from: Olivia Reeder RN skin assessment completed by: Jay  Bed algorithm reevaluated: yes  Was Pulsate ordered?: no  Flu shot ordered? (October-April only): no  Detailed Belongings: sweat pants gray, green shirt, yellow toiletries bag with toiletries.         RN Decompensation Assessment (Repeat at 12 hours)    (Additional guidance for RRT)      Mentation:  Exam is notable for alert and oriented x4    Respiratory mechanics and oxygenation:  Exam is notable for no SOB on RA    Cardiovascular/perfusion:   Exam is notable for NSR   Overall estimation of the patient s condition/stability (1 = stable patient, 7 = appears very sick)? 3

## 2022-05-08 NOTE — PROGRESS NOTES
IP Diabetes Management  Daily Note           Assessment and Plan:   HPI: Isabell Matthews is a 61 year old female, with h/o  seizure disorder, type 2 diabetes treated as type 1, CKD3, HTN, HLD, CAD s/p PCI, hypothyroidism, vascular dementia, multiple prior ischemic strokes and recent acute ischemic stroke of right frontoparietal punctate stroke due to small-vessel occlusion (was hospitalized in 4/5/22), presenting with DKA in setting of infection and withheld insulin.    Assessment:   1)Type 2 Diabetes Mellitus treated as type 1, uncontrolled (A1c 8.9), presenting with DKA precipitated by lack of insulin and UTI  2) TF induced hyperglycemia    Plan:    -continue Lantus 14 units daily in AM   -continue novolog medium resistance sliding scale insulin AC, HS   -reduce Novolog to 1:18g CHO from 1:15g CHO coverage with meals and snacks/supplements    -recommend to restart D10 at 25 ml/hour if BG<80, primary team managing fluids   -BG monitoring AC, HS, 0200   -hypoglycemia protocol   -carb counting protocol   -diabetes education needs will be assessed closer to discharge.     Outpatient follow up: TBD (PCP vs. [facility])    Plan discussed with patient, bedside RN, and primary team through this note.       Interval History and Assessment: interval glucose trend reviewed:       D10 off since 1500 yesterday.  BG maintaining nicely with no hypoglycemia.  this AM.  Eating 1/2-almost all of morning muffing.  RN to give 1 unit novolog for CHO coverage.  Reviewed insulin plan with RN and patient.  RN would prefer Lantus dosing at 0800, will move it to this time tomorrow.     Patient disoriented X 2-3 overnight, had unwitnessed fall around 0100.      Patient denies nausea, abdominal pain.     Historical 5/5:   She states that the patient has not been eating well at all since April.  She had been reducing her Lantus doses accordingly, but held the evening Lantus dose on Monday night.  BG then scarlett and patient  presented in DKA.  She reports that her mom started vomiting when BG scarlett.        Current nutritional intake and type: Orders Placed This Encounter      Easy to Chew Diet (level 7)    Steroid planning: none    PTA Diabetes Regimen:   Lantus 14-20 units at bedtime (per daughter's discretion)  humalog 1-3 units with meals    Discharge Planning: TBD           Diabetes History:   Type of Diabetes: Type 1 Diabetes Mellitus  Lab Results   Component Value Date    A1C 8.9 05/03/2022    A1C 8.8 04/05/2022    A1C 8.2 08/02/2021    A1C 7.8 06/21/2021    A1C 11.7 09/27/2020              Review of Systems:     The Review of Systems is negative other than noted in the Interval History.           Medications:     Current Facility-Administered Medications   Medication      SYNTHROID **BRAND NAME ONLY** tablet 88 mcg     aspirin (ASA) chewable tablet 81 mg     atorvastatin (LIPITOR) tablet 40 mg     carvedilol (COREG) tablet 6.25 mg     cefTRIAXone (ROCEPHIN) 1 g vial to attach to  mL bag for ADULTS or NS 50 mL bag for PEDS     cyanocobalamin (VITAMIN B-12) tablet 1,000 mcg     dextrose 10% infusion     dextrose 10% infusion     dextrose 10% infusion     dextrose 50 % injection 25-50 mL     glucose gel 15-30 g    Or     dextrose 50 % injection 25-50 mL    Or     glucagon injection 1 mg     doxazosin (CARDURA) tablet 1 mg     DULoxetine (CYMBALTA) DR capsule 20 mg     heparin ANTICOAGULANT injection 5,000 Units     hydrALAZINE (APRESOLINE) injection 10 mg     insulin 1 unit/mL in saline (NovoLIN, HumuLIN Regular) drip - ADULT IV Infusion     insulin aspart (NovoLOG) injection (RAPID ACTING)     insulin aspart (NovoLOG) injection (RAPID ACTING)     insulin aspart (NovoLOG) injection (RAPID ACTING)     insulin aspart (NovoLOG) injection (RAPID ACTING)     insulin glargine (LANTUS PEN) injection 14 Units     levETIRAcetam (KEPPRA) intermittent infusion 500 mg     [Held by provider] levETIRAcetam (KEPPRA) tablet 500 mg      lidocaine (LMX4) cream     lidocaine 1 % 0.1-1 mL     Med Instruction - Transition from IV Insulin Infusion to Sub-Q Insulin     melatonin tablet 1 mg     multivitamins w/minerals liquid 15 mL     [Held by provider] NIFEdipine ER (ADALAT CC) 24 hr tablet 60 mg     ondansetron (ZOFRAN-ODT) ODT tab 4 mg    Or     ondansetron (ZOFRAN) injection 4 mg     pregabalin (LYRICA) capsule 75 mg     senna-docusate (SENOKOT-S/PERICOLACE) 8.6-50 MG per tablet 1 tablet    Or     senna-docusate (SENOKOT-S/PERICOLACE) 8.6-50 MG per tablet 2 tablet     sodium chloride (PF) 0.9% PF flush 3 mL     sodium chloride (PF) 0.9% PF flush 3 mL     sodium chloride (PF) 0.9% PF flush 3 mL     thiamine (B-1) tablet 100 mg     Vitamin D3 (CHOLECALCIFEROL) tablet 25 mcg            Physical Exam:    BP (!) 167/71 (BP Location: Right arm, Cuff Size: Adult Regular)   Pulse 82   Temp 97.9  F (36.6  C) (Oral)   Resp 20   Wt 59.5 kg (131 lb 2.8 oz)   SpO2 98%   BMI 23.24 kg/m    General: sitting up in bed.   HEENT: normocephalic, atraumatic. Oral mucous membranes dry.  Lungs: unlabored respiration, no cough  ABD: rounded, nondistended  Skin: warm and dry, no obvious lesions  MSK:  moves all extremities  Mental status:  alert to person. Answers some questions nonsensically  Psych:  calm interaction with provider.             Data:     Recent Labs   Lab 05/08/22  0150 05/07/22  2135 05/07/22  1851 05/07/22  1417 05/07/22  1305 05/07/22  1232   * 172* 220* 128* 156* 171*     Lab Results   Component Value Date    WBC 7.4 05/07/2022    WBC 4.8 05/06/2022    WBC 5.8 05/05/2022    HGB 10.4 (L) 05/07/2022    HGB 11.5 (L) 05/06/2022    HGB 10.8 (L) 05/05/2022    HCT 32.3 (L) 05/07/2022    HCT 36.3 05/06/2022    HCT 34.2 (L) 05/05/2022    MCV 91 05/07/2022    MCV 90 05/06/2022    MCV 90 05/05/2022     05/07/2022     (L) 05/06/2022     05/05/2022     Lab Results   Component Value Date     (H) 05/07/2022     05/06/2022      05/05/2022    POTASSIUM 3.6 05/07/2022    POTASSIUM 3.6 05/06/2022    POTASSIUM 3.5 05/05/2022    CHLORIDE 116 (H) 05/07/2022    CHLORIDE 114 (H) 05/06/2022    CHLORIDE 112 (H) 05/05/2022    CO2 22 05/07/2022    CO2 21 05/06/2022    CO2 22 05/05/2022     (H) 05/08/2022     (H) 05/07/2022     (H) 05/07/2022     Lab Results   Component Value Date    BUN 10 05/07/2022    BUN 8 05/06/2022    BUN 12 05/05/2022     Lab Results   Component Value Date    TSH 0.63 08/02/2021    TSH 0.65 09/27/2020     Lab Results   Component Value Date    AST 17 05/07/2022    AST 34 05/06/2022    AST 27 05/05/2022    ALT 16 05/07/2022    ALT 19 05/06/2022    ALT 16 05/05/2022    ALKPHOS 178 (H) 05/07/2022    ALKPHOS 161 (H) 05/06/2022    ALKPHOS 129 05/05/2022         35 minutes spent on the date of the encounter doing chart review, history and exam, documentation and further activities per the note      Over 50% of my time on the unit was spent counseling the patient and/or coordinating care regarding acute hyperglycemia management.  See note for details.    To contact Endocrine Diabetes service:   From 8AM-4PM: page inpatient diabetes provider that is following the patient  For questions or updates from 4PM-8AM: page the diabetes job code for on call fellow: [phone]    PEGGY Palafox, CNP  Inpatient Diabetes Management Service  Pager [phone]

## 2022-05-08 NOTE — PROGRESS NOTES
[facility]    Medicine Progress Note - Hospitalist Service, GOLD TEAM 10    Date of Admission:  5/3/2022    Assessment & Plan        Isabell Matthews is a 61 year old female, with h/o acute ischemic stroke of right frontopperital punctate stroke due to small-vessel occlusion (was hospitalized in 4/5/22), multiple lacunar and punctate ischemic infarcts and multifocal moderate to severe stenosis in multiple vascular territories, seizure disorder, DMII, CKD3, HTN, HLD, CAD s/p PCI, hypothyroidism, vascular dementia, presenting with AMS/aphasia, found to have hyperglycemia 2/2 DKA and elevated LA.    Changes today   Stable off insulin drip.  Restart home nifedipine secondary to hypotension  Patient had fall overnight with negative survey this AM.  Calorie counts  Sodium remains elevated.  Encourage free water by mouth.  If persistent may benefit from more  half-normal saline  1 more day of antibiotic therapy      # Hyperglycemia 2/2 DKA  # UTI  # H/o DMII (No DEMETRIA ab/C-peptide)  Her glucose was 686, ketones 5.4, hgba1c 8.9. Had DKA in 10/2020. Per Endo, suspect insulin dependent DMII.     Etiology: may be due to UTI: UA showing bacteria and WB 15 with small LE. Per neuro note, it states that daughter/care taker has been dealing with respirator illness recently. It may also be due to skipped insulin evening 5/1, daytime 5/2 due to decreased appetite and concern for precipitating hypoglycemia per caretaker/daughter.  - s/p Zosyn and Vanc in ED, continued on medicine floor, may continue per primary team, ucx pending  - holding pta glargine 18 U at bedtime, pta lispro mealtime SSI for now  - pta pregabalin 75 mg bid (held)  - DKA proctocol ordered  - Endocrine consulted  - BMP, phos q2h ordered  - phos, BMP, Mg, CBC, abg ordered  - Bcx x1 pending from ED  - EKG ordered  - negative  covid, influenza, respiratory panel     # AGMA with uncompensated respiratory acidosis  #  Hyperkalemia  # Elevated lactate  Resolved      AGMA due to lactate, ketoacidosis 2/2 DKA. Will improve with fluid resuscitation and insulin. Hyperkalemia 2/2 DKA/AGMA.    # Acute on Chronic kidney disease  Cr 1.84 on admission. Cr baseline 1.2-1.45. 2/2 volume depletion 2/2 DKA.       # Acute metabolic toxic encephalopathy  # Aphasia  # Concern for acute stroke  Of note in 4/2022, pt presented with 1 day of difficulty waking and 45 mins of L eye vision loss.  IV thrombolysis was not given  due to unclear or unfavorable risk-benefit profile for extended window thrombolysis beyond the conventional 4.5 hour time window. Etiology was thought to be small vessel disease.  Was recommended asa 81 mg daily with hgba1c goal <7.     Upon admission to the ED, there was concern for aphagia and stroke code was called, CT head showed no acute intracranial hemorrhages, showed small chronic infarcts. CTA head showed no intracranial arterial large vessel occlusion, and CT neck showed left carotid bifurcation moderate stenosis, atherosclerotic plaque results in less than 50% stenosis in the right carotid bulb and proximal cervical ICA, chronic right vertebral artery and proximal V1 with severe stenosis, but overall no significant interval change compared to CTA head and neck 04/10/2022. Not given TPA due to history of intracranial hemorrhage.      Etiology ddx: During stroke code, CT and CTA head imaging did not demonstrate clear new occlusive lesion or developing area of hypodensity concerning for stroke as primary etiology. Most likely toxic metabolic from DKA vs. Infection (respiratory vs UTI).  - pta atorvastatin 40 mg daily  - pta asa 81 mg daily  - q4h neuro checks  - consider MRI brain per primary team  - pending influenza, covid, respiratory panel     --Chronic diseases--  **Will place essential meds to IV instead of PTA PO since pt has AMS and is not able to do PO, not following commands**     #Seizure disorder  - PTA Keppra  500 mg BID (IV)     #CKD3  - BMP, phos q2h    #HTN  On admission /77.  - PTA coreg 6.25 mg, and nifedipine 60 mg daily (instead of this placed PRN hydralzine 10 mg q6h)     #HLD  - PTA atrova 40 mg daily     #CAD s/p PCI  - PTA coreg and nifedipine 60 mg daily (instead of this placed PRN hydralzine 10 mg q6h, fpr SBP >170)     #Hypothyroidism  - PTA synthroid 88 mcg daily     #Vascular dementia  - Delirium precautions     # MDD  - pta duloxetine 20 mg daily     # Urinary retention  - pta doxazosin          Diet:      DVT Prophylaxis: Heparin SQ  Parker Catheter: Not present  Central Lines: None  Cardiac Monitoring: None  Code Status: Full Code      Disposition Plan   Expected Discharge: 05/11/2022   Anticipated discharge location:  Awaiting care coordination huddle  Delays:            The patient's care was discussed with the Patient.    Paul Kulkarni MD  Hospitalist Service, [address]  Securely message with the Vocera Web Console (learn more here)  Text page via [employer] Paging/Directory   Please see signed in provider for up to date coverage information      Clinically Significant Risk Factors Present on Admission                # Moderate Malnutrition: based on nutrition assessment     ______________________________________________________________________    Interval History   Responding to commands  No fever or chills  No bladder discomfort  No chest pain  No SOB  Mild left upper quadrant discomfort with improvement after BM    Data reviewed today: I reviewed all medications, new labs and imaging results over the last 24 hours. I personally reviewed no images or EKG's today.    Physical Exam   Vital Signs: Temp: 98.6  F (37  C) Temp src: Oral BP: 138/66 Pulse: 79   Resp: 18 SpO2: 97 % O2 Device: None (Room air)    Weight: 131 lbs 2.78 oz  Physical Exam  Constitutional:       Appearance: She is not ill-appearing or toxic-appearing.   HENT:      Head:  Normocephalic.      Mouth/Throat:      Mouth: Mucous membranes are moist.   Cardiovascular:      Rate and Rhythm: Normal rate and regular rhythm.      Heart sounds: No murmur heard.    No friction rub. No gallop.   Pulmonary:      Effort: Pulmonary effort is normal. No respiratory distress.      Breath sounds: No stridor. No wheezing.   Abdominal:      General: Abdomen is flat. There is no distension.      Palpations: There is no mass.      Tenderness: There is no abdominal tenderness.   Musculoskeletal:      Right lower leg: No edema.      Left lower leg: No edema.   Neurological:      General: No focal deficit present.      Mental Status: She is oriented to person, place, and time.      Cranial Nerves: No cranial nerve deficit.      Sensory: No sensory deficit.      Motor: No weakness.      Comments: Upper ext appears spastic    Psychiatric:         Mood and Affect: Mood normal.         Behavior: Behavior normal.         Data   Recent Labs   Lab 05/08/22  1234 05/08/22  0748 05/08/22  0731 05/07/22  0902 05/07/22  0850 05/06/22  0911 05/06/22  0857 05/03/22  0235 05/03/22  0229   WBC  --   --  4.3  --  7.4  --  4.8   < >  --    HGB  --   --  9.6*  --  10.4*  --  11.5*   < >  --    MCV  --   --  91  --  91  --  90   < >  --    PLT  --   --  129*  --  152  --  121*   < >  --    INR  --   --   --   --   --   --   --   --  1.3*   NA  --   --  145*  --  145*  --  142   < >  --    POTASSIUM  --   --  3.8  --  3.6  --  3.6   < >  --    CHLORIDE  --   --  114*  --  116*  --  114*   < >  --    CO2  --   --  23  --  22  --  21   < >  --    BUN  --   --  13  --  10  --  8   < >  --    CR  --   --  1.20*  --  1.27*  --  1.18*   < >  --    ANIONGAP  --   --  8  --  7  --  7   < >  --    VERONICA  --   --  9.0  --  8.7  --  8.6   < >  --    * 116* 129*   < > 70   < > 82   < >  --    ALBUMIN  --   --  2.2*  --  2.3*  --  2.5*   < >  --    PROTTOTAL  --   --  5.1*  --  5.4*  --  5.9*   < >  --    BILITOTAL  --   --  0.4  --   0.2  --  0.3   < >  --    ALKPHOS  --   --  159*  --  178*  --  161*   < >  --    ALT  --   --  18  --  16  --  19   < >  --    AST  --   --  22  --  17  --  34   < >  --     < > = values in this interval not displayed.

## 2022-05-08 NOTE — PROGRESS NOTES
"   05/08/22 1045   Quick Adds   Type of Visit Initial PT Evaluation   Living Environment   People in Home child(tiffany), adult   Current Living Arrangements apartment   Transportation Anticipated family or friend will provide   Living Environment Comments Pt is a poor historian. Per chart review, lives in apartment with daughter who is her full time PCA.   Self-Care   Usual Activity Tolerance moderate   Current Activity Tolerance fair   Regular Exercise No   Equipment Currently Used at Home wheelchair, manual;walker, standard   Fall history within last six months yes   Activity/Exercise/Self-Care Comment Per chart review, pt's daughter is her PCA, pt \"is a total assist and pretty much needs helps with everything including - med management, bathing, tranfers, transport. She needs to be watched while she eats, otherwise she won't eat. Lately instead of a walker, pt has needed to be moved around by wheel chair.\" Pt states she uses w/c when going out into the community. Otherwise, walks within the house with AD. Pt cannot report number of times she has fallen. Pt had a fall just last night in the hospital.   General Information   Onset of Illness/Injury or Date of Surgery 05/03/22   Referring Physician Paul Kulkarni MD   Patient/Family Therapy Goals Statement (PT) return home   Pertinent History of Current Problem (include personal factors and/or comorbidities that impact the POC) Isabell Matthews is a 61 year old female, with h/o acute ischemic stroke of right frontopperital punctate stroke due to small-vessel occlusion (was hospitalized in 4/5/22), multiple lacunar and punctate ischemic infarcts and multifocal moderate to severe stenosis in multiple vascular territories, seizure disorder, DMII, CKD3, HTN, HLD, CAD s/p PCI, hypothyroidism, vascular dementia, presenting with AMS/aphasia, found to have hyperglycemia 2/2 DKA and elevated LA.   Existing Precautions/Restrictions fall   Cognition   Affect/Mental Status " (Cognition) sad/depressed affect   Orientation Status (Cognition) disoriented to;place;situation   Follows Commands (Cognition) follows one-step commands;75-90% accuracy   Pain Assessment   Patient Currently in Pain Yes, see Vital Sign flowsheet  (back and side)   Integumentary/Edema   Integumentary/Edema no deficits were identifed   Posture    Posture Forward head position;Protracted shoulders   Range of Motion (ROM)   Range of Motion ROM is WFL   Strength (Manual Muscle Testing)   Strength Comments Generalized weakness   Bed Mobility   Comment, (Bed Mobility) min A supine<>sit   Transfers   Comment, (Transfers) min A x1-2 sit<>stand, stand pivot transfer to chair   Gait/Stairs (Locomotion)   Comment, (Gait/Stairs) unable to assess   Balance   Balance Comments fair sitting and standing balance- requires FWW   Sensory Examination   Sensory Perception Comments Pt reports tingling in feet/toes   Clinical Impression   Criteria for Skilled Therapeutic Intervention Yes, treatment indicated   PT Diagnosis (PT) Impaired functional mobility   Influenced by the following impairments activity tolerance, strength, pain, balance   Functional limitations due to impairments functional endurance, bed mobility, transfers, gait, stairs   Clinical Presentation (PT Evaluation Complexity) Evolving/Changing   Clinical Presentation Rationale PMH/comorbidities, clinical judgement   Clinical Decision Making (Complexity) moderate complexity   Planned Therapy Interventions (PT) balance training;bed mobility training;gait training;home exercise program;neuromuscular re-education;patient/family education;postural re-education;stair training;strengthening   Risk & Benefits of therapy have been explained evaluation/treatment results reviewed;care plan/treatment goals reviewed;risks/benefits reviewed;current/potential barriers reviewed;participants voiced agreement with care plan;participants included;patient   PT Discharge Planning   PT  Discharge Recommendation (DC Rec) Transitional Care Facility;home with assist   PT Rationale for DC Rec Pt mobilizing below baseline mobility, is a falls risk. Would benefit from TCU stay to increase functional mobility and strength. Pt lives with daughter who is her PCA. Will continue to update recs, TCU vs home with assist, pending daughter's input.   PT Brief overview of current status Ax1-2 sit<>stand and pivot transfers w/ FWW and gait belt   Plan of Care Review   Plan of Care Reviewed With patient   Total Evaluation Time   Total Evaluation Time (Minutes) 5   Physical Therapy Goals   PT Frequency 5x/week   PT Predicted Duration/Target Date for Goal Attainment 05/29/22   PT Goals Bed Mobility;Transfers;Gait   PT: Bed Mobility Modified independent;Supine to/from sit   PT: Transfers Modified independent;Sit to/from stand   PT: Gait Modified independent;150 feet;Assistive device

## 2022-05-08 NOTE — PROGRESS NOTES
Report given to Briana. Patient going to 5233- bed 2.  Patient transferred in stable conditions.   Called Vishal Peña (daughter) to [phone] and left voicemail to inform her that her mom transferred to a [facility] floor.

## 2022-05-09 ENCOUNTER — APPOINTMENT (OUTPATIENT)
Dept: SPEECH THERAPY | Facility: CLINIC | Age: 62
DRG: 637 | End: 2022-05-09
Payer: MEDICARE

## 2022-05-09 LAB
ALBUMIN SERPL-MCNC: 2.1 G/DL (ref 3.4–5)
ALP SERPL-CCNC: 170 U/L (ref 40–150)
ALT SERPL W P-5'-P-CCNC: 24 U/L (ref 0–50)
ANION GAP SERPL CALCULATED.3IONS-SCNC: 5 MMOL/L (ref 3–14)
AST SERPL W P-5'-P-CCNC: 38 U/L (ref 0–45)
BILIRUB SERPL-MCNC: 0.1 MG/DL (ref 0.2–1.3)
BUN SERPL-MCNC: 10 MG/DL (ref 7–30)
CALCIUM SERPL-MCNC: 8.5 MG/DL (ref 8.5–10.1)
CHLORIDE BLD-SCNC: 112 MMOL/L (ref 94–109)
CO2 SERPL-SCNC: 26 MMOL/L (ref 20–32)
CREAT SERPL-MCNC: 1.28 MG/DL (ref 0.52–1.04)
ERYTHROCYTE [DISTWIDTH] IN BLOOD BY AUTOMATED COUNT: 14.8 % (ref 10–15)
GFR SERPL CREATININE-BSD FRML MDRD: 47 ML/MIN/1.73M2
GLUCOSE BLD-MCNC: 125 MG/DL (ref 70–99)
GLUCOSE BLDC GLUCOMTR-MCNC: 122 MG/DL (ref 70–99)
GLUCOSE BLDC GLUCOMTR-MCNC: 147 MG/DL (ref 70–99)
GLUCOSE BLDC GLUCOMTR-MCNC: 162 MG/DL (ref 70–99)
GLUCOSE BLDC GLUCOMTR-MCNC: 179 MG/DL (ref 70–99)
GLUCOSE BLDC GLUCOMTR-MCNC: 99 MG/DL (ref 70–99)
HCT VFR BLD AUTO: 30 % (ref 35–47)
HGB BLD-MCNC: 9.6 G/DL (ref 11.7–15.7)
MAGNESIUM SERPL-MCNC: 1.8 MG/DL (ref 1.6–2.3)
MCH RBC QN AUTO: 29 PG (ref 26.5–33)
MCHC RBC AUTO-ENTMCNC: 32 G/DL (ref 31.5–36.5)
MCV RBC AUTO: 91 FL (ref 78–100)
PHOSPHATE SERPL-MCNC: 3.4 MG/DL (ref 2.5–4.5)
PLATELET # BLD AUTO: 168 10E3/UL (ref 150–450)
POTASSIUM BLD-SCNC: 3.9 MMOL/L (ref 3.4–5.3)
PROT SERPL-MCNC: 5.3 G/DL (ref 6.8–8.8)
RBC # BLD AUTO: 3.31 10E6/UL (ref 3.8–5.2)
SODIUM SERPL-SCNC: 143 MMOL/L (ref 133–144)
WBC # BLD AUTO: 5.3 10E3/UL (ref 4–11)

## 2022-05-09 PROCEDURE — 36415 COLL VENOUS BLD VENIPUNCTURE: CPT | Performed by: INTERNAL MEDICINE

## 2022-05-09 PROCEDURE — 120N000002 HC R&B MED SURG/OB UMMC

## 2022-05-09 PROCEDURE — 80053 COMPREHEN METABOLIC PANEL: CPT | Performed by: INTERNAL MEDICINE

## 2022-05-09 PROCEDURE — 84100 ASSAY OF PHOSPHORUS: CPT | Performed by: INTERNAL MEDICINE

## 2022-05-09 PROCEDURE — 99232 SBSQ HOSP IP/OBS MODERATE 35: CPT | Performed by: PHYSICIAN ASSISTANT

## 2022-05-09 PROCEDURE — 82040 ASSAY OF SERUM ALBUMIN: CPT | Performed by: INTERNAL MEDICINE

## 2022-05-09 PROCEDURE — 250N000011 HC RX IP 250 OP 636: Performed by: INTERNAL MEDICINE

## 2022-05-09 PROCEDURE — 250N000011 HC RX IP 250 OP 636: Performed by: STUDENT IN AN ORGANIZED HEALTH CARE EDUCATION/TRAINING PROGRAM

## 2022-05-09 PROCEDURE — 99232 SBSQ HOSP IP/OBS MODERATE 35: CPT | Performed by: INTERNAL MEDICINE

## 2022-05-09 PROCEDURE — 250N000013 HC RX MED GY IP 250 OP 250 PS 637: Performed by: INTERNAL MEDICINE

## 2022-05-09 PROCEDURE — 85027 COMPLETE CBC AUTOMATED: CPT | Performed by: INTERNAL MEDICINE

## 2022-05-09 PROCEDURE — 92526 ORAL FUNCTION THERAPY: CPT | Mod: GN | Performed by: SPEECH-LANGUAGE PATHOLOGIST

## 2022-05-09 PROCEDURE — 83735 ASSAY OF MAGNESIUM: CPT | Performed by: INTERNAL MEDICINE

## 2022-05-09 PROCEDURE — 250N000013 HC RX MED GY IP 250 OP 250 PS 637: Performed by: STUDENT IN AN ORGANIZED HEALTH CARE EDUCATION/TRAINING PROGRAM

## 2022-05-09 RX ADMIN — PREGABALIN 75 MG: 75 CAPSULE ORAL at 08:11

## 2022-05-09 RX ADMIN — CARVEDILOL 6.25 MG: 6.25 TABLET, FILM COATED ORAL at 08:11

## 2022-05-09 RX ADMIN — DULOXETINE 20 MG: 20 CAPSULE, DELAYED RELEASE ORAL at 08:11

## 2022-05-09 RX ADMIN — NIFEDIPINE 30 MG: 30 TABLET, FILM COATED, EXTENDED RELEASE ORAL at 08:11

## 2022-05-09 RX ADMIN — CEFTRIAXONE SODIUM 1 G: 1 INJECTION, POWDER, FOR SOLUTION INTRAMUSCULAR; INTRAVENOUS at 08:24

## 2022-05-09 RX ADMIN — LEVOTHYROXINE SODIUM 88 MCG: 88 TABLET ORAL at 08:11

## 2022-05-09 RX ADMIN — ATORVASTATIN CALCIUM 40 MG: 40 TABLET, FILM COATED ORAL at 08:11

## 2022-05-09 RX ADMIN — THIAMINE HCL TAB 100 MG 100 MG: 100 TAB at 08:11

## 2022-05-09 RX ADMIN — DOXAZOSIN 1 MG: 1 TABLET ORAL at 21:55

## 2022-05-09 RX ADMIN — HEPARIN SODIUM 5000 UNITS: 5000 INJECTION, SOLUTION INTRAVENOUS; SUBCUTANEOUS at 08:11

## 2022-05-09 RX ADMIN — Medication 25 MCG: at 08:11

## 2022-05-09 RX ADMIN — PREGABALIN 75 MG: 75 CAPSULE ORAL at 20:51

## 2022-05-09 RX ADMIN — ASPIRIN 81 MG CHEWABLE TABLET 81 MG: 81 TABLET CHEWABLE at 08:11

## 2022-05-09 RX ADMIN — LEVETIRACETAM 500 MG: 5 INJECTION INTRAVENOUS at 20:51

## 2022-05-09 RX ADMIN — HEPARIN SODIUM 5000 UNITS: 5000 INJECTION, SOLUTION INTRAVENOUS; SUBCUTANEOUS at 20:52

## 2022-05-09 RX ADMIN — Medication 15 ML: at 08:11

## 2022-05-09 RX ADMIN — CYANOCOBALAMIN TAB 500 MCG 1000 MCG: 500 TAB at 08:11

## 2022-05-09 RX ADMIN — CARVEDILOL 6.25 MG: 6.25 TABLET, FILM COATED ORAL at 18:09

## 2022-05-09 RX ADMIN — LEVETIRACETAM 500 MG: 5 INJECTION INTRAVENOUS at 08:09

## 2022-05-09 ASSESSMENT — ACTIVITIES OF DAILY LIVING (ADL)
ADLS_ACUITY_SCORE: 45
ADLS_ACUITY_SCORE: 21
ADLS_ACUITY_SCORE: 21
ADLS_ACUITY_SCORE: 25
ADLS_ACUITY_SCORE: 21
ADLS_ACUITY_SCORE: 45
ADLS_ACUITY_SCORE: 21
ADLS_ACUITY_SCORE: 45
ADLS_ACUITY_SCORE: 45
ADLS_ACUITY_SCORE: 21
ADLS_ACUITY_SCORE: 45
ADLS_ACUITY_SCORE: 21
ADLS_ACUITY_SCORE: 21
ADLS_ACUITY_SCORE: 45
ADLS_ACUITY_SCORE: 21
ADLS_ACUITY_SCORE: 45
ADLS_ACUITY_SCORE: 45
ADLS_ACUITY_SCORE: 21

## 2022-05-09 NOTE — PROGRESS NOTES
Calorie Count  Intake recorded for: 5/8  Total Kcals: 1073 Total Protein: 49g  Kcals from Hospital Food: 1073   Protein: 49g  Kcals from Outside Food (average):0 Protein: 0g  # Meals Ordered from Kitchen: 3 meals  # Meals Recorded: 3 meals  (First - 100% blueberry muffin, coffee w/ cream)  (Second - 100% cookie, 75% mashed potatoes w/ gravy, carrots, deli meat)  (Third - 100% chicken noodle soup, 50% pork quesadilla, mamta food cake)  # Supplements Recorded: 0

## 2022-05-09 NOTE — PLAN OF CARE
BP (!) 149/65   Pulse 69   Temp 98.9  F (37.2  C) (Oral)   Resp 16   Wt 69.4 kg (153 lb)   SpO2 96%   BMI 27.10 kg/m    Assumed care 1500 to 1930  Pt rounded on hourly  Lgenn: alert oriented to self disoriented to time, place, and situation.  pt slow to respond left sided facial droop  Pain: denies  GI/: Denies nausea. Bowel sounds present. Good urine output clear yellow urine pt incontinent. Purewic in place. Pt ate dinner with her daughter. Pt is on and easy to chew diet. Needs 1:1 supervision with eating  Cardiac: WDL  Respiratory: denies SOB lung sounds clear bilaterally diminished lower lobes   Skin: clean dry and intact growth on right ear. Redness to groin, barrier cream applied   Lines: PIV saline locked  Assist level: 1 assit walker and gait belt   Will continue to monitor and follow plan of care.   Plan: Renal MRI tomorrow     tre pts [place] and live with her.      Call light in reach, Pt able to make needs known, Will continue to monitor and follow plan of care.      Goal Outcome Evaluation:     Plan of Care Reviewed With: pts daughter Tre     Overall Patient Progress: progressing     Outcome Evaluation: working with PT/OT

## 2022-05-09 NOTE — PROGRESS NOTES
Assumed care admision to 1930  Pt rounded on hourly  Glenn: alert and oriented x4 pt slow to respond   Pain: denies  GI/: Denies nausea. Bowel sounds present. Good urine output clear yellow urine pt incontinent  Cardiac: WDL  Respiratory: denies SOB lung sounds clear bilaterally  Skin: clean dry and intact growth on right ear   Lines: PIV saline locked  Assist level: 1 assit walker and gait belt   Will continue to monitor and follow plan of care.   Plan:  tre pts [place] and [place] with her.     Call light in reach, Pt able to make needs known, Will continue to monitor and follow plan of care.     Goal Outcome Evaluation:     Plan of Care Reviewed With: pts daughter Tre     Overall Patient Progress: progressing     Outcome Evaluation:

## 2022-05-09 NOTE — PLAN OF CARE
/58 (BP Location: Left arm)   Pulse 85   Temp 97.7  F (36.5  C) (Oral)   Resp 17   Wt 69.4 kg (153 lb)   SpO2 95%   BMI 27.10 kg/m      Shift: 0645-4668  Reason for Admission: hyperglycemia 2/2 DKA and elevated LA  VS: VSS on RA  Neuros: Disoriented to place, situation. L. Sided facial droop. Weak BLE  GI/: No BMs this shift, incontinent of urine/stool. Purewick in place  Nutrition: Mince/moist with 1:1 supervision per SLP today  Drains/Lines: R. PIV TKO  Activity: A2 to A1  Pain/Nausea: Denies both  Skin: Reddened buttocks- barrier cream applied. Please no briefs on if pt is incontinent.  Labs: BG ACHS  New this shift: MRI checklist completed however, unsure if pt is the best historian so called MRI to figure out a plan. SLP today- consulted to change diet orders.   Plan of care: Plan for renal MRI today or tomorrow. Will continue to monitor and follow POC.

## 2022-05-09 NOTE — PROGRESS NOTES
[facility]    Medicine Progress Note - Hospitalist Service, GOLD TEAM 10    Date of Admission:  5/3/2022    Assessment & Plan        Isabell Matthews is a 61 year old female, with h/o acute ischemic stroke of right frontopperital punctate stroke due to small-vessel occlusion (was hospitalized in 4/5/22), multiple lacunar and punctate ischemic infarcts and multifocal moderate to severe stenosis in multiple vascular territories, seizure disorder, DMII, CKD3, HTN, HLD, CAD s/p PCI, hypothyroidism, vascular dementia, presenting with AMS/aphasia, found to have hyperglycemia 2/2 DKA and elevated LA. mentation for approximately 3 days was diminished enough to make eating difficult.  NG tube was started and nutrition advanced.  Patient pulled out NG tube 2 times over a 48-hour period.  Mentation improved steadily on IV antibiotics and adequate nutrition.  DKA protocol was continued until adequate oral intake.  Patient was bridged with D10.  Patient continues to have some residual cognitive deficits including slowed/delayed speech.    Changes today   Continue calorie counts  Appreciate endocrine recommendations on insulin management  Pending placement.  Patient and family have been in and out of of TCU's recently with some apprehension about returning.  PT OT recommending discharge to transitional care facility      # Hyperglycemia 2/2 DKA  # UTI  # H/o DMII (No DEMETRIA ab/C-peptide)  Her glucose was 686, ketones 5.4, hgba1c 8.9. Had DKA in 10/2020. Per Endo, suspect insulin dependent DMII.     Etiology: may be due to UTI: UA showing bacteria and WB 15 with small LE. Per neuro note, it states that daughter/care taker has been dealing with respirator illness recently. It may also be due to skipped insulin evening 5/1, daytime 5/2 due to decreased appetite and concern for precipitating hypoglycemia per caretaker/daughter.  - s/p Zosyn and Vanc in ED, continued on medicine floor, may  continue per primary team, ucx pending  - holding pta glargine 18 U at bedtime, pta lispro mealtime SSI for now  - pta pregabalin 75 mg bid (held)  - DKA proctocol ordered  - Endocrine consulted  - BMP, phos q2h ordered  - phos, BMP, Mg, CBC, abg ordered  - Bcx x1 pending from ED  - EKG ordered  - negative  covid, influenza, respiratory panel     # AGMA with uncompensated respiratory acidosis  # Hyperkalemia  # Elevated lactate  Resolved      AGMA due to lactate, ketoacidosis 2/2 DKA. Will improve with fluid resuscitation and insulin. Hyperkalemia 2/2 DKA/AGMA.    # Acute on Chronic kidney disease  Cr 1.84 on admission. Cr baseline 1.2-1.45. 2/2 volume depletion 2/2 DKA.       # Acute metabolic toxic encephalopathy  # Aphasia  # Concern for acute stroke    Of note in 4/2022, pt presented with 1 day of difficulty waking and 45 mins of L eye vision loss.  IV thrombolysis was not given  due to unclear or unfavorable risk-benefit profile for extended window thrombolysis beyond the conventional 4.5 hour time window. Etiology was thought to be small vessel disease.  Was recommended asa 81 mg daily with hgba1c goal <7.     Upon admission to the ED, there was concern for aphagia and stroke code was called, CT head showed no acute intracranial hemorrhages, showed small chronic infarcts. CTA head showed no intracranial arterial large vessel occlusion, and CT neck showed left carotid bifurcation moderate stenosis, atherosclerotic plaque results in less than 50% stenosis in the right carotid bulb and proximal cervical ICA, chronic right vertebral artery and proximal V1 with severe stenosis, but overall no significant interval change compared to CTA head and neck 04/10/2022. Not given TPA due to history of intracranial hemorrhage.     Clinical picture is consistent with toxic metabolic encephalopathy with EEG showing evidence of diffuse slow-wave.  MRI nonrevealing for acute stroke.  Complete antibiotics on  5/9/2022       --Chronic diseases--  **Will place essential meds to IV instead of PTA PO since pt has AMS and is not able to do PO, not following commands**     #Seizure disorder  - PTA Keppra 500 mg BID (IV) transition to p.o.     #CKD3  - BMP, phos q2h    #HTN  On admission /77.  - PTA coreg 6.25 mg, and nifedipine 60 mg daily (instead of this placed PRN hydralzine 10 mg q6h)     #HLD  - PTA atrova 40 mg daily     #CAD s/p PCI  - PTA coreg and nifedipine 60 mg daily (instead of this placed PRN hydralzine 10 mg q6h, fpr SBP >170)     #Hypothyroidism  - PTA synthroid 88 mcg daily     #Vascular dementia  - Delirium precautions     # MDD  - pta duloxetine 20 mg daily     # Urinary retention  - pta doxazosin          Diet:      DVT Prophylaxis: Heparin SQ  Parker Catheter: Not present  Central Lines: None  Cardiac Monitoring: None  Code Status: Full Code      Disposition Plan   Expected Discharge: 05/11/2022   Anticipated discharge location:  Awaiting care coordination huddle  Delays:            The patient's care was discussed with the Patient.    Paul Kulkarni MD  Hospitalist Service, [address]  Securely message with the Vocera Web Console (learn more here)  Text page via [facility] Paging/Directory   Please see signed in provider for up to date coverage information      Clinically Significant Risk Factors Present on Admission                # Moderate Malnutrition: based on nutrition assessment     ______________________________________________________________________    Interval History   Responding to commands  No fever or chills  No bladder discomfort  No chest pain  No SOB  Resting comfortably.  No new complaints.    Data reviewed today: I reviewed all medications, new labs and imaging results over the last 24 hours. I personally reviewed no images or EKG's today.    Physical Exam   Vital Signs: Temp: 97.7  F (36.5  C) Temp src: Oral BP: 132/58 Pulse:  85   Resp: 17 SpO2: 95 % O2 Device: None (Room air)    Weight: 153 lbs 0 oz  Physical Exam  Constitutional:       Appearance: She is not ill-appearing or toxic-appearing.   HENT:      Head: Normocephalic.      Mouth/Throat:      Mouth: Mucous membranes are moist.   Cardiovascular:      Rate and Rhythm: Normal rate and regular rhythm.      Heart sounds: No murmur heard.    No friction rub. No gallop.   Pulmonary:      Effort: Pulmonary effort is normal. No respiratory distress.      Breath sounds: No stridor. No wheezing.   Abdominal:      General: Abdomen is flat. There is no distension.      Palpations: There is no mass.      Tenderness: There is no abdominal tenderness.   Musculoskeletal:      Right lower leg: No edema.      Left lower leg: No edema.   Neurological:      General: No focal deficit present.      Mental Status: She is oriented to person, place, and time.      Cranial Nerves: No cranial nerve deficit.      Sensory: No sensory deficit.      Motor: No weakness.      Comments: Upper ext appears spastic    Psychiatric:         Mood and Affect: Mood normal.         Behavior: Behavior normal.         Data   Recent Labs   Lab 05/09/22  1128 05/09/22  1127 05/09/22  0746 05/08/22  0748 05/08/22  0731 05/07/22  0902 05/07/22  0850 05/03/22  0235 05/03/22  0229   WBC  --  5.3  --   --  4.3  --  7.4   < >  --    HGB  --  9.6*  --   --  9.6*  --  10.4*   < >  --    MCV  --  91  --   --  91  --  91   < >  --    PLT  --  168  --   --  129*  --  152   < >  --    INR  --   --   --   --   --   --   --   --  1.3*   NA  --  143  --   --  145*  --  145*   < >  --    POTASSIUM  --  3.9  --   --  3.8  --  3.6   < >  --    CHLORIDE  --  112*  --   --  114*  --  116*   < >  --    CO2  --  26  --   --  23  --  22   < >  --    BUN  --  10  --   --  13  --  10   < >  --    CR  --  1.28*  --   --  1.20*  --  1.27*   < >  --    ANIONGAP  --  5  --   --  8  --  7   < >  --    VERONICA  --  8.5  --   --  9.0  --  8.7   < >  --    GLC  122* 125* 162*   < > 129*   < > 70   < >  --    ALBUMIN  --  2.1*  --   --  2.2*  --  2.3*   < >  --    PROTTOTAL  --  5.3*  --   --  5.1*  --  5.4*   < >  --    BILITOTAL  --  0.1*  --   --  0.4  --  0.2   < >  --    ALKPHOS  --  170*  --   --  159*  --  178*   < >  --    ALT  --  24  --   --  18  --  16   < >  --    AST  --  38  --   --  22  --  17   < >  --     < > = values in this interval not displayed.

## 2022-05-09 NOTE — PLAN OF CARE
Time 7587-6125    Vitals: VSS on RA  Activity: A1-2 w/GB & walker, bed alarm, encourage call light use  Pain: Denies  Neuro:  A&OX4, waxes/wanes, slow to respond  Cardiac:  WDL  Respiratory:  Denies cough/SOB  GI/: Incontinent B/B, Denies N/V  Diet: Easy to chew (Level 7), Calorie Counts, CHO coverage  Lines: R PIV SL  Skin/Wounds: BL skinned knees, otherwise CDI  Labs/imaging:  Reviewed     New changes this shift:  Stable overnight. Give meds w/applesauce with reminders to swallow.     Plan: Continue to monitor and manage BG. Continue PT/OT/SLP.    Continue to monitor and follow POC        Goal Outcome Evaluation:    Plan of Care Reviewed With: patient     Overall Patient Progress: improving    Outcome Evaluation: A&OX4. BG within targeted range.

## 2022-05-09 NOTE — PROGRESS NOTES
[facility] Diabetes Management  Daily Note           Assessment and Plan:   HPI: Isabell Matthews is a 61 year old female, with h/o  seizure disorder, type 2 diabetes treated as type 1, CKD3, HTN, HLD, CAD s/p PCI, hypothyroidism, vascular dementia, multiple prior ischemic strokes and recent acute ischemic stroke of right frontoparietal punctate stroke due to small-vessel occlusion (was hospitalized in 4/5/22), presenting with DKA in setting of infection and withheld insulin.    Assessment:   1)Type 2 Diabetes Mellitus treated as type 1, uncontrolled (A1c 8.9), presenting with DKA precipitated by lack of insulin and UTI  2) TF induced hyperglycemia    Plan:    -Lantus 14 units daily in AM   -Novolog medium resistance sliding scale insulin AC, HS   -Novolog 1:18g CHO coverage with meals and snacks/supplements    -recommend to restart D10 at 25 ml/hour if BG<80, primary team managing fluids   -BG monitoring AC, HS, 0200   -hypoglycemia protocol   -carb counting protocol   -diabetes education needs will be assessed closer to discharge.     Outpatient follow up: TBD (PCP vs. [facility])      Interval History and Assessment: interval glucose trend reviewed:   BG trending higher yesterday after gentle reduction in mealtime insulin, despite stoppage of D10%. Antonio count 1073. So far today, with no changes to insulin, >122.    Meds are being given in applesauce.     Historical 5/5:   She states that the patient has not been eating well at all since April.  She had been reducing her Lantus doses accordingly, but held the evening Lantus dose on Monday night.  BG then scarlett and patient presented in DKA.  She reports that her mom started vomiting when BG scarlett.        Current nutritional intake and type: Orders Placed This Encounter      Easy to Chew Diet (level 7)    Steroid planning: none    PTA Diabetes Regimen:   Lantus 14-20 units at bedtime (per daughter's discretion)  humalog 1-3 units with meals    Discharge Planning:  TBD           Diabetes History:   Type of Diabetes: Type 1 Diabetes Mellitus  Lab Results   Component Value Date    A1C 8.9 05/03/2022    A1C 8.8 04/05/2022    A1C 8.2 08/02/2021    A1C 7.8 06/21/2021    A1C 11.7 09/27/2020              Review of Systems:     The Review of Systems is negative other than noted in the Interval History.           Medications:     Current Facility-Administered Medications   Medication      SYNTHROID **BRAND NAME ONLY** tablet 88 mcg     aspirin (ASA) chewable tablet 81 mg     atorvastatin (LIPITOR) tablet 40 mg     carvedilol (COREG) tablet 6.25 mg     cyanocobalamin (VITAMIN B-12) tablet 1,000 mcg     dextrose 10% infusion     dextrose 10% infusion     dextrose 10% infusion     dextrose 50 % injection 25-50 mL     glucose gel 15-30 g    Or     dextrose 50 % injection 25-50 mL    Or     glucagon injection 1 mg     doxazosin (CARDURA) tablet 1 mg     DULoxetine (CYMBALTA) DR capsule 20 mg     [Held by provider] escitalopram (LEXAPRO) tablet 10 mg     heparin ANTICOAGULANT injection 5,000 Units     hydrALAZINE (APRESOLINE) injection 10 mg     insulin aspart (NovoLOG) injection (RAPID ACTING)     insulin aspart (NovoLOG) injection (RAPID ACTING)     insulin aspart (NovoLOG) injection (RAPID ACTING)     insulin aspart (NovoLOG) injection (RAPID ACTING)     insulin glargine (LANTUS PEN) injection 14 Units     levETIRAcetam (KEPPRA) intermittent infusion 500 mg     [Held by provider] levETIRAcetam (KEPPRA) tablet 500 mg     lidocaine (LMX4) cream     lidocaine 1 % 0.1-1 mL     Med Instruction - Transition from IV Insulin Infusion to Sub-Q Insulin     melatonin tablet 1 mg     multivitamins w/minerals liquid 15 mL     [Held by provider] NIFEdipine ER (ADALAT CC) 24 hr tablet 60 mg     NIFEdipine ER OSMOTIC (PROCARDIA XL) 24 hr tablet 30 mg     ondansetron (ZOFRAN-ODT) ODT tab 4 mg    Or     ondansetron (ZOFRAN) injection 4 mg     pregabalin (LYRICA) capsule 75 mg     senna-docusate  (SENOKOT-S/PERICOLACE) 8.6-50 MG per tablet 1 tablet    Or     senna-docusate (SENOKOT-S/PERICOLACE) 8.6-50 MG per tablet 2 tablet     sodium chloride (PF) 0.9% PF flush 3 mL     sodium chloride (PF) 0.9% PF flush 3 mL     sodium chloride (PF) 0.9% PF flush 3 mL     thiamine (B-1) tablet 100 mg     Vitamin D3 (CHOLECALCIFEROL) tablet 25 mcg            Physical Exam:    /58 (BP Location: Left arm)   Pulse 85   Temp 97.7  F (36.5  C) (Oral)   Resp 17   Wt 69.4 kg (153 lb)   SpO2 95%   BMI 27.10 kg/m    General: sitting up in bed.   HEENT: normocephalic, atraumatic. Oral mucous membranes dry.  Lungs: unlabored respiration, no cough  ABD: rounded, nondistended  Skin: warm and dry, no obvious lesions  MSK:  moves all extremities  Mental status:  alert to person.  Psych:  calm interaction with provider.             Data:     Recent Labs   Lab 05/09/22  1128 05/09/22  1127 05/09/22  0746 05/09/22  0154 05/08/22  2239 05/08/22  1741   * 125* 162* 179* 221* 226*     Lab Results   Component Value Date    WBC 5.3 05/09/2022    WBC 4.3 05/08/2022    WBC 7.4 05/07/2022    HGB 9.6 (L) 05/09/2022    HGB 9.6 (L) 05/08/2022    HGB 10.4 (L) 05/07/2022    HCT 30.0 (L) 05/09/2022    HCT 30.2 (L) 05/08/2022    HCT 32.3 (L) 05/07/2022    MCV 91 05/09/2022    MCV 91 05/08/2022    MCV 91 05/07/2022     05/09/2022     (L) 05/08/2022     05/07/2022     Lab Results   Component Value Date     05/09/2022     (H) 05/08/2022     (H) 05/07/2022    POTASSIUM 3.9 05/09/2022    POTASSIUM 3.8 05/08/2022    POTASSIUM 3.6 05/07/2022    CHLORIDE 112 (H) 05/09/2022    CHLORIDE 114 (H) 05/08/2022    CHLORIDE 116 (H) 05/07/2022    CO2 26 05/09/2022    CO2 23 05/08/2022    CO2 22 05/07/2022     (H) 05/09/2022     (H) 05/09/2022     (H) 05/09/2022     Lab Results   Component Value Date    BUN 13 05/08/2022    BUN 10 05/07/2022    BUN 8 05/06/2022     Lab Results   Component Value  Date    TSH 0.63 08/02/2021    TSH 0.65 09/27/2020     Lab Results   Component Value Date    AST 38 05/09/2022    AST 22 05/08/2022    AST 17 05/07/2022    ALT 24 05/09/2022    ALT 18 05/08/2022    ALT 16 05/07/2022    ALKPHOS 170 (H) 05/09/2022    ALKPHOS 159 (H) 05/08/2022    ALKPHOS 178 (H) 05/07/2022     35 minutes spent on the date of the encounter doing chart review, history and exam, documentation and further activities per the note      Over 50% of my time on the unit was spent counseling the patient and/or coordinating care regarding acute hyperglycemia management.  See note for details.    To contact Endocrine Diabetes service:   From 8AM-4PM: page inpatient diabetes provider that is following the patient  For questions or updates from 4PM-8AM: page the diabetes job code for on call fellow: [phone]    Shira Luna PA-C  Diabetes Management Service  Pager [phone]

## 2022-05-10 ENCOUNTER — APPOINTMENT (OUTPATIENT)
Dept: MRI IMAGING | Facility: CLINIC | Age: 62
DRG: 637 | End: 2022-05-10
Attending: INTERNAL MEDICINE
Payer: MEDICARE

## 2022-05-10 ENCOUNTER — APPOINTMENT (OUTPATIENT)
Dept: SPEECH THERAPY | Facility: CLINIC | Age: 62
DRG: 637 | End: 2022-05-10
Payer: MEDICARE

## 2022-05-10 LAB
ALBUMIN SERPL-MCNC: 2.1 G/DL (ref 3.4–5)
ALP SERPL-CCNC: 179 U/L (ref 40–150)
ALT SERPL W P-5'-P-CCNC: 24 U/L (ref 0–50)
ANION GAP SERPL CALCULATED.3IONS-SCNC: 5 MMOL/L (ref 3–14)
AST SERPL W P-5'-P-CCNC: 33 U/L (ref 0–45)
BILIRUB SERPL-MCNC: 0.2 MG/DL (ref 0.2–1.3)
BUN SERPL-MCNC: 12 MG/DL (ref 7–30)
CALCIUM SERPL-MCNC: 8.6 MG/DL (ref 8.5–10.1)
CHLORIDE BLD-SCNC: 109 MMOL/L (ref 94–109)
CO2 SERPL-SCNC: 29 MMOL/L (ref 20–32)
CREAT SERPL-MCNC: 1.24 MG/DL (ref 0.52–1.04)
ERYTHROCYTE [DISTWIDTH] IN BLOOD BY AUTOMATED COUNT: 14.8 % (ref 10–15)
GFR SERPL CREATININE-BSD FRML MDRD: 49 ML/MIN/1.73M2
GLUCOSE BLD-MCNC: 192 MG/DL (ref 70–99)
GLUCOSE BLDC GLUCOMTR-MCNC: 138 MG/DL (ref 70–99)
GLUCOSE BLDC GLUCOMTR-MCNC: 179 MG/DL (ref 70–99)
GLUCOSE BLDC GLUCOMTR-MCNC: 199 MG/DL (ref 70–99)
GLUCOSE BLDC GLUCOMTR-MCNC: 209 MG/DL (ref 70–99)
GLUCOSE BLDC GLUCOMTR-MCNC: 221 MG/DL (ref 70–99)
HCT VFR BLD AUTO: 32.4 % (ref 35–47)
HGB BLD-MCNC: 10.3 G/DL (ref 11.7–15.7)
MCH RBC QN AUTO: 29 PG (ref 26.5–33)
MCHC RBC AUTO-ENTMCNC: 31.8 G/DL (ref 31.5–36.5)
MCV RBC AUTO: 91 FL (ref 78–100)
PLATELET # BLD AUTO: 192 10E3/UL (ref 150–450)
POTASSIUM BLD-SCNC: 3.9 MMOL/L (ref 3.4–5.3)
PROT SERPL-MCNC: 5.2 G/DL (ref 6.8–8.8)
RBC # BLD AUTO: 3.55 10E6/UL (ref 3.8–5.2)
SARS-COV-2 RNA RESP QL NAA+PROBE: NEGATIVE
SODIUM SERPL-SCNC: 143 MMOL/L (ref 133–144)
WBC # BLD AUTO: 4.7 10E3/UL (ref 4–11)

## 2022-05-10 PROCEDURE — 250N000013 HC RX MED GY IP 250 OP 250 PS 637: Performed by: STUDENT IN AN ORGANIZED HEALTH CARE EDUCATION/TRAINING PROGRAM

## 2022-05-10 PROCEDURE — 80053 COMPREHEN METABOLIC PANEL: CPT | Performed by: INTERNAL MEDICINE

## 2022-05-10 PROCEDURE — 250N000013 HC RX MED GY IP 250 OP 250 PS 637: Performed by: INTERNAL MEDICINE

## 2022-05-10 PROCEDURE — 99232 SBSQ HOSP IP/OBS MODERATE 35: CPT | Performed by: PHYSICIAN ASSISTANT

## 2022-05-10 PROCEDURE — 85027 COMPLETE CBC AUTOMATED: CPT | Performed by: INTERNAL MEDICINE

## 2022-05-10 PROCEDURE — 36415 COLL VENOUS BLD VENIPUNCTURE: CPT | Performed by: INTERNAL MEDICINE

## 2022-05-10 PROCEDURE — 82040 ASSAY OF SERUM ALBUMIN: CPT | Performed by: INTERNAL MEDICINE

## 2022-05-10 PROCEDURE — A9585 GADOBUTROL INJECTION: HCPCS | Performed by: STUDENT IN AN ORGANIZED HEALTH CARE EDUCATION/TRAINING PROGRAM

## 2022-05-10 PROCEDURE — 92526 ORAL FUNCTION THERAPY: CPT | Mod: GN

## 2022-05-10 PROCEDURE — G1004 CDSM NDSC: HCPCS | Performed by: RADIOLOGY

## 2022-05-10 PROCEDURE — U0003 INFECTIOUS AGENT DETECTION BY NUCLEIC ACID (DNA OR RNA); SEVERE ACUTE RESPIRATORY SYNDROME CORONAVIRUS 2 (SARS-COV-2) (CORONAVIRUS DISEASE [COVID-19]), AMPLIFIED PROBE TECHNIQUE, MAKING USE OF HIGH THROUGHPUT TECHNOLOGIES AS DESCRIBED BY CMS-2020-01-R: HCPCS | Performed by: STUDENT IN AN ORGANIZED HEALTH CARE EDUCATION/TRAINING PROGRAM

## 2022-05-10 PROCEDURE — 120N000002 HC R&B MED SURG/OB UMMC

## 2022-05-10 PROCEDURE — 250N000011 HC RX IP 250 OP 636: Performed by: STUDENT IN AN ORGANIZED HEALTH CARE EDUCATION/TRAINING PROGRAM

## 2022-05-10 PROCEDURE — 99232 SBSQ HOSP IP/OBS MODERATE 35: CPT | Performed by: STUDENT IN AN ORGANIZED HEALTH CARE EDUCATION/TRAINING PROGRAM

## 2022-05-10 PROCEDURE — 255N000002 HC RX 255 OP 636: Performed by: STUDENT IN AN ORGANIZED HEALTH CARE EDUCATION/TRAINING PROGRAM

## 2022-05-10 PROCEDURE — 74183 MRI ABD W/O CNTR FLWD CNTR: CPT | Mod: 26 | Performed by: RADIOLOGY

## 2022-05-10 PROCEDURE — 999N000128 HC STATISTIC PERIPHERAL IV START W/O US GUIDANCE

## 2022-05-10 PROCEDURE — 74183 MRI ABD W/O CNTR FLWD CNTR: CPT | Mod: MG

## 2022-05-10 RX ORDER — GADOBUTROL 604.72 MG/ML
6.5 INJECTION INTRAVENOUS ONCE
Status: COMPLETED | OUTPATIENT
Start: 2022-05-10 | End: 2022-05-10

## 2022-05-10 RX ORDER — LORAZEPAM 2 MG/ML
.5-1 INJECTION INTRAMUSCULAR
Status: DISCONTINUED | OUTPATIENT
Start: 2022-05-10 | End: 2022-05-14 | Stop reason: HOSPADM

## 2022-05-10 RX ADMIN — CYANOCOBALAMIN TAB 500 MCG 1000 MCG: 500 TAB at 10:00

## 2022-05-10 RX ADMIN — NIFEDIPINE 30 MG: 30 TABLET, FILM COATED, EXTENDED RELEASE ORAL at 09:59

## 2022-05-10 RX ADMIN — PREGABALIN 75 MG: 75 CAPSULE ORAL at 09:58

## 2022-05-10 RX ADMIN — HEPARIN SODIUM 5000 UNITS: 5000 INJECTION, SOLUTION INTRAVENOUS; SUBCUTANEOUS at 09:50

## 2022-05-10 RX ADMIN — HEPARIN SODIUM 5000 UNITS: 5000 INJECTION, SOLUTION INTRAVENOUS; SUBCUTANEOUS at 20:12

## 2022-05-10 RX ADMIN — DOXAZOSIN 1 MG: 1 TABLET ORAL at 21:05

## 2022-05-10 RX ADMIN — ATORVASTATIN CALCIUM 40 MG: 40 TABLET, FILM COATED ORAL at 09:59

## 2022-05-10 RX ADMIN — LEVETIRACETAM 500 MG: 5 INJECTION INTRAVENOUS at 09:33

## 2022-05-10 RX ADMIN — CARVEDILOL 6.25 MG: 6.25 TABLET, FILM COATED ORAL at 09:58

## 2022-05-10 RX ADMIN — CARVEDILOL 6.25 MG: 6.25 TABLET, FILM COATED ORAL at 18:00

## 2022-05-10 RX ADMIN — Medication 25 MCG: at 09:59

## 2022-05-10 RX ADMIN — DULOXETINE 20 MG: 20 CAPSULE, DELAYED RELEASE ORAL at 09:59

## 2022-05-10 RX ADMIN — GADOBUTROL 6.5 ML: 604.72 INJECTION INTRAVENOUS at 11:49

## 2022-05-10 RX ADMIN — PREGABALIN 75 MG: 75 CAPSULE ORAL at 20:12

## 2022-05-10 RX ADMIN — ASPIRIN 81 MG CHEWABLE TABLET 81 MG: 81 TABLET CHEWABLE at 09:59

## 2022-05-10 RX ADMIN — LEVOTHYROXINE SODIUM 88 MCG: 88 TABLET ORAL at 09:58

## 2022-05-10 RX ADMIN — THIAMINE HCL TAB 100 MG 100 MG: 100 TAB at 09:59

## 2022-05-10 ASSESSMENT — ACTIVITIES OF DAILY LIVING (ADL)
ADLS_ACUITY_SCORE: 45
ADLS_ACUITY_SCORE: 47
ADLS_ACUITY_SCORE: 45
ADLS_ACUITY_SCORE: 43
ADLS_ACUITY_SCORE: 45
ADLS_ACUITY_SCORE: 45
ADLS_ACUITY_SCORE: 47
ADLS_ACUITY_SCORE: 45
ADLS_ACUITY_SCORE: 47
ADLS_ACUITY_SCORE: 45
ADLS_ACUITY_SCORE: 47
ADLS_ACUITY_SCORE: 45
ADLS_ACUITY_SCORE: 45
ADLS_ACUITY_SCORE: 47
ADLS_ACUITY_SCORE: 47
ADLS_ACUITY_SCORE: 45
ADLS_ACUITY_SCORE: 45
ADLS_ACUITY_SCORE: 47

## 2022-05-10 NOTE — PROGRESS NOTES
IP Diabetes Management  Daily Note           Assessment and Plan:   HPI: Isabell Matthews is a 61 year old female, with h/o  seizure disorder, type 2 diabetes treated as type 1, CKD3, HTN, HLD, CAD s/p PCI, hypothyroidism, vascular dementia, multiple prior ischemic strokes and recent acute ischemic stroke of right frontoparietal punctate stroke due to small-vessel occlusion (was hospitalized in 4/5/22), presenting with DKA in setting of infection and withheld insulin.    Assessment:   1)Type 2 Diabetes Mellitus treated as type 1, uncontrolled (A1c 8.9), presenting with DKA precipitated by lack of insulin and UTI  2) TF induced hyperglycemia    Plan while hospitalized:    -Lantus 14 units daily in AM   -Novolog 1:18g CHO coverage with meals and snacks/supplements    -Novolog moderate intensity sliding scale insulin AC, HS   -BG monitoring AC, HS, 0200   -hypoglycemia protocol   -carb counting protocol   -diabetes education needs- daughter/caregiver will maintain insulin administration/dosing oversight as per PTA, pt is not able to manage.     Recommendations for Discharge to  ARU/TCU:   -Lantus 14 units daily in AM   -Novolog 1:18g CHO coverage with meals and snacks/supplements    -Novolog moderate intensity sliding scale insulin AC, HS    Mealtime Scale  BG less than 140, none   - 189 = 1 unit.    - 239 = 2    - 289 = 3     - 339 = 4   BG over 340 = 5     Bedtime Scale  BG less than 200, none   - 249 = 1 unit.    - 299 = 2     - 349 = 3   BG over 350 = 4       Recommendations for Discharge to home, or outside TCU: only change, is to fixed meal insulin, from carb counting.    -Lantus 14 units daily AM   -Novolog/Humalog 2 units per meal   -Novolog/Humalog moderate sliding scale before meals and at bedtime.     Mealtime Scale  BG less than 140, none   - 189 = 1 unit.    - 239 = 2    - 289 = 3     - 339 = 4   BG over 340 = 5     Bedtime Scale  BG less than  200, none   - 249 = 1 unit.    - 299 = 2     - 349 = 3   BG over 350 = 4     Outpatient follow up: PCP vs. [facility]. Will place appt request to [facility] schedulers, to call pt's daughter and schedule if interested.     DIABETES TEAM TO SIGN OFF TODAY; PLEASE CALL BACK WITH QUESTIONS PRIOR TO DISCHARGE.     Interval History and Assessment: interval glucose trend reviewed:   BG are in reasonable control.    Meds are being given in applesauce. And eating slowly with 1:1 assist, bites of food at a time. Tough to calculate/administer carb coverage at times.     Notes indicate family would prefer [facility] ARU stay, then discharge to home with daughter.      Historical 5/5:   She states that the patient has not been eating well at all since April.  She had been reducing her Lantus doses accordingly, but held the evening Lantus dose on Monday night.  BG then scarlett and patient presented in DKA.  She reports that her mom started vomiting when BG scarlett.        Current nutritional intake and type: Orders Placed This Encounter      Easy to Chew Diet (level 7)    Steroid planning: none    PTA Diabetes Regimen:   Lantus 14-20 units at bedtime (per daughter's discretion)  Humalog 1-3 units with meals    Discharge Planning: TBD           Diabetes History:   Type of Diabetes: Type 1 Diabetes Mellitus  Lab Results   Component Value Date    A1C 8.9 05/03/2022    A1C 8.8 04/05/2022    A1C 8.2 08/02/2021    A1C 7.8 06/21/2021    A1C 11.7 09/27/2020              Review of Systems:     The Review of Systems is negative other than noted in the Interval History.           Medications:     Current Facility-Administered Medications   Medication      SYNTHROID **BRAND NAME ONLY** tablet 88 mcg     aspirin (ASA) chewable tablet 81 mg     atorvastatin (LIPITOR) tablet 40 mg     carvedilol (COREG) tablet 6.25 mg     cyanocobalamin (VITAMIN B-12) tablet 1,000 mcg     dextrose 10% infusion     dextrose 10% infusion      dextrose 10% infusion     dextrose 50 % injection 25-50 mL     glucose gel 15-30 g    Or     dextrose 50 % injection 25-50 mL    Or     glucagon injection 1 mg     doxazosin (CARDURA) tablet 1 mg     DULoxetine (CYMBALTA) DR capsule 20 mg     [Held by provider] escitalopram (LEXAPRO) tablet 10 mg     heparin ANTICOAGULANT injection 5,000 Units     hydrALAZINE (APRESOLINE) injection 10 mg     insulin aspart (NovoLOG) injection (RAPID ACTING)     insulin aspart (NovoLOG) injection (RAPID ACTING)     insulin aspart (NovoLOG) injection (RAPID ACTING)     insulin aspart (NovoLOG) injection (RAPID ACTING)     insulin glargine (LANTUS PEN) injection 14 Units     levETIRAcetam (KEPPRA) intermittent infusion 500 mg     [Held by provider] levETIRAcetam (KEPPRA) tablet 500 mg     lidocaine (LMX4) cream     lidocaine 1 % 0.1-1 mL     Med Instruction - Transition from IV Insulin Infusion to Sub-Q Insulin     melatonin tablet 1 mg     multivitamins w/minerals liquid 15 mL     [Held by provider] NIFEdipine ER (ADALAT CC) 24 hr tablet 60 mg     NIFEdipine ER OSMOTIC (PROCARDIA XL) 24 hr tablet 30 mg     ondansetron (ZOFRAN-ODT) ODT tab 4 mg    Or     ondansetron (ZOFRAN) injection 4 mg     pregabalin (LYRICA) capsule 75 mg     senna-docusate (SENOKOT-S/PERICOLACE) 8.6-50 MG per tablet 1 tablet    Or     senna-docusate (SENOKOT-S/PERICOLACE) 8.6-50 MG per tablet 2 tablet     sodium chloride (PF) 0.9% PF flush 3 mL     sodium chloride (PF) 0.9% PF flush 3 mL     sodium chloride (PF) 0.9% PF flush 3 mL     thiamine (B-1) tablet 100 mg     Vitamin D3 (CHOLECALCIFEROL) tablet 25 mcg            Physical Exam:    BP (!) 141/58 (BP Location: Left arm)   Pulse 77   Temp 97.8  F (36.6  C) (Oral)   Resp 16   Wt 63.8 kg (140 lb 10.5 oz)   SpO2 95%   BMI 24.92 kg/m    General: sitting up in bed.   HEENT: normocephalic, atraumatic. Oral mucous membranes dry.  Lungs: unlabored respiration, no cough  ABD: rounded, nondistended  Skin: warm  and dry, no obvious lesions  MSK:  moves all extremities  Mental status:  alert to person.  Psych:  calm interaction with provider.             Data:     Recent Labs   Lab 05/10/22  1157 05/10/22  0940 05/10/22  0450 05/10/22  0203 05/09/22  2207 05/09/22  1725   * 221* 192* 179* 147* 99     Lab Results   Component Value Date    WBC 4.7 05/10/2022    WBC 5.3 05/09/2022    WBC 4.3 05/08/2022    HGB 10.3 (L) 05/10/2022    HGB 9.6 (L) 05/09/2022    HGB 9.6 (L) 05/08/2022    HCT 32.4 (L) 05/10/2022    HCT 30.0 (L) 05/09/2022    HCT 30.2 (L) 05/08/2022    MCV 91 05/10/2022    MCV 91 05/09/2022    MCV 91 05/08/2022     05/10/2022     05/09/2022     (L) 05/08/2022     Lab Results   Component Value Date     05/10/2022     05/09/2022     (H) 05/08/2022    POTASSIUM 3.9 05/10/2022    POTASSIUM 3.9 05/09/2022    POTASSIUM 3.8 05/08/2022    CHLORIDE 109 05/10/2022    CHLORIDE 112 (H) 05/09/2022    CHLORIDE 114 (H) 05/08/2022    CO2 29 05/10/2022    CO2 26 05/09/2022    CO2 23 05/08/2022     (H) 05/10/2022     (H) 05/10/2022     (H) 05/09/2022     Lab Results   Component Value Date    BUN 12 05/10/2022    BUN 10 05/09/2022    BUN 13 05/08/2022     Lab Results   Component Value Date    TSH 0.63 08/02/2021    TSH 0.65 09/27/2020     Lab Results   Component Value Date    AST 33 05/10/2022    AST 38 05/09/2022    AST 22 05/08/2022    ALT 24 05/10/2022    ALT 24 05/09/2022    ALT 18 05/08/2022    ALKPHOS 179 (H) 05/10/2022    ALKPHOS 170 (H) 05/09/2022    ALKPHOS 159 (H) 05/08/2022     35 minutes spent on the date of the encounter doing chart review, history and exam, documentation and further activities per the note      Over 50% of my time on the unit was spent counseling the patient and/or coordinating care regarding acute hyperglycemia management.  See note for details.    To contact Endocrine Diabetes service:   From 8AM-4PM: page inpatient diabetes provider that  is following the patient  For questions or updates from 4PM-8AM: page the diabetes job code for on call fellow: [phone]    Shira Luna PA-C  Diabetes Management Service  Pager [phone]

## 2022-05-10 NOTE — DISCHARGE INSTRUCTIONS
Diabetes Recommendations for Discharge to home:   -Lantus 14 units daily AM   -Novolog/Humalog 2 units per meal   -Novolog/Humalog moderate sliding scale before meals and at bedtime. This is added to the fixed meal dose, based upon blood sugar, as a back up in case blood sugars are high.     Mealtime Scale  BG less than 140, none   - 189 = 1 unit.    - 239 = 2    - 289 = 3     - 339 = 4   BG over 340 = 5     Bedtime Scale  BG less than 200, none   - 249 = 1 unit.    - 299 = 2     - 349 = 3       Blood Glucose Checks: three times daily before meals, and at bedtime.    Endocrinology Outpatient follow up: An appointment request was sent to the [facility] coordinator to schedule your outpatient diabetes appointment 2-3 weeks from discharge. Please call the clinic at [phone] if you do not have an appointment scheduled on discharge, or if you have non-urgent questions regarding your blood sugars or insulin.     If you have urgent questions or concerns regarding your blood sugars or insulin, you may contact [phone] (the main hospital ). Ask to speak with the endocrinologist on call.    Thank you for letting the Diabetes Management Team be involved in your care!

## 2022-05-10 NOTE — PLAN OF CARE
Goal Outcome Evaluation:    Plan of Care Reviewed With: patient, guardian      Shift: [id number]  Reason for Admission: hyperglycemia 2/2 DKA and elevated LA  VS: BP (!) 150/68 (BP Location: Left arm)   Pulse 84   Temp 97.4  F (36.3  C) (Oral)   Resp 18   Wt 63.8 kg (140 lb 10.5 oz)   SpO2 95%   BMI 24.92 kg/m   RA.  Neuros: Disoriented to place, situation. L. Sided facial droop. Weak BLE  GI/: No BMs this shift, incontinent of urine/stool. Purewick in place  Nutrition: Mince/moist with 1:1 supervision per SLP today.Very slow eater with few bites.  Drains/Lines: R. PIV TKO  Activity: with 1-2 assist  Pain/Nausea: Denies both  Skin: Reddened buttocks- barrier cream applied. Please no briefs on if pt is incontinent.BM yesterday  Labs: Blood glucose 178   MRI checklist completed  and sent yesterday for MRI today  Plan of care: Plan for renal MRI today. Will discharge with home care or TCU.Continue to monitor and follow POC.

## 2022-05-10 NOTE — PLAN OF CARE
Goal Outcome Evaluation:  Reason for Admission: hyperglycemia 2/2 DKA and elevated LA  VS: VSS. Sat's in 90's on RA.  Neuros: Disoriented to time, place, situation. Swallows slowly and deliberately.  GI/: Tolerating easy to chew diet. Feeds herself with supervision. Purewick in place. Incontinent x 1 of large amount of urine and large stool. Calorie count in progress.  Drains/Lines: R. PIV SL'd.  Activity: Assist of 1-2  Pain/Nausea: Denies both  Skin: Reddened buttocks- barrier cream applied.   Labs: Glucose 221 and 199. Renal MRI done today.  Plan of care: Will discharge with home care or TCU. Continue to monitor and follow POC.

## 2022-05-10 NOTE — PROGRESS NOTES
[facility]    Medicine Progress Note - Hospitalist Service, GOLD TEAM 10    Date of Admission:  5/3/2022    Assessment & Plan            Isabell Matthews is a 61 year old female, with h/o acute ischemic stroke of right frontopperital punctate stroke due to small-vessel occlusion (was hospitalized in 4/5/22), multiple lacunar and punctate ischemic infarcts and multifocal moderate to severe stenosis in multiple vascular territories, seizure disorder, DMII, CKD3, HTN, HLD, CAD s/p PCI, hypothyroidism, vascular dementia, presenting with AMS/aphasia, found to have hyperglycemia 2/2 DKA and elevated LA. mentation for approximately 3 days was diminished enough to make eating difficult.  NG tube was started and nutrition advanced.  Patient pulled out NG tube 2 times over a 48-hour period.  Mentation improved steadily on IV antibiotics and adequate nutrition.  DKA protocol was continued until adequate oral intake.  Patient was bridged with D10.  Patient continues to have some residual cognitive deficits including slowed/delayed speech.    Changes today   Continue calorie counts  Appreciate endocrine recommendations on insulin management  Pending placement.  Patient and family have been in and out of of TCU's recently with some apprehension about returning.  PT OT recommending discharge to transitional care facility. Patient and family are accepting.      # Hyperglycemia 2/2 DKA  # UTI  # H/o DMII (No DEMETRIA ab/C-peptide)  Her glucose was 686, ketones 5.4, hgba1c 8.9. Had DKA in 10/2020. Per Endo, suspect insulin dependent DMII.  Etiology: may be due to UTI: UA showing bacteria and WB 15 with small LE. Per neuro note, it states that daughter/care taker has been dealing with respirator illness recently. It may also be due to skipped insulin evening 5/1, daytime 5/2 due to decreased appetite and concern for precipitating hypoglycemia per caretaker/daughter.  - s/p Zosyn and Vanc in ED with  transition to ceftriaxone with completion of 7 day total course for possible UTI  - pta pregabalin 75 mg bid (held)  - Endocrine consulted, now signed off   - Recs for discharge regimen to home vs TCU in endo progress note dated 5/10  - negative  covid, influenza, respiratory panel     # AGMA with uncompensated respiratory acidosis  # Hyperkalemia  # Elevated lactate    # Acute on Chronic kidney disease  Cr 1.84 on admission. Cr baseline 1.2-1.45. 2/2 volume depletion 2/2 DKA.     # Acute metabolic toxic encephalopathy  # Aphasia  # Concern for acute stroke    Of note in 4/2022, pt presented with 1 day of difficulty waking and 45 mins of L eye vision loss.  IV thrombolysis was not given  due to unclear or unfavorable risk-benefit profile for extended window thrombolysis beyond the conventional 4.5 hour time window. Etiology was thought to be small vessel disease.  Was recommended asa 81 mg daily with hgba1c goal <7.     Upon admission to the ED, there was concern for aphagia and stroke code was called, CT head showed no acute intracranial hemorrhages, showed small chronic infarcts. CTA head showed no intracranial arterial large vessel occlusion, and CT neck showed left carotid bifurcation moderate stenosis, atherosclerotic plaque results in less than 50% stenosis in the right carotid bulb and proximal cervical ICA, chronic right vertebral artery and proximal V1 with severe stenosis, but overall no significant interval change compared to CTA head and neck 04/10/2022. Not given TPA due to history of intracranial hemorrhage.     Clinical picture is consistent with toxic metabolic encephalopathy with EEG showing evidence of diffuse slow-wave.  MRI nonrevealing for acute stroke.  Complete antibiotics on 5/9/2022       --Chronic diseases--  **Will place essential meds to IV instead of PTA PO since pt has AMS and is not able to do PO, not following commands**     #Seizure disorder  - PTA Keppra 500 mg BID (IV) transition  to p.o.     #CKD3  - BMP, phos q2h    #HTN  On admission /77.  - PTA coreg 6.25 mg, and nifedipine 60 mg daily (instead of this placed PRN hydralzine 10 mg q6h)     #HLD  - PTA atrova 40 mg daily     #CAD s/p PCI  - PTA coreg and nifedipine 60 mg daily (instead of this placed PRN hydralzine 10 mg q6h, fpr SBP >170)     #Hypothyroidism  - PTA synthroid 88 mcg daily     #Vascular dementia  - Delirium precautions     # MDD  - pta duloxetine 20 mg daily     # Urinary retention  - pta doxazosin       Diet:  Regular diet, easy to chew with thin liquids  DVT Prophylaxis: Pneumatic Compression Devices  Parker Catheter: Not present  Central Lines: None  Cardiac Monitoring: None  Code Status: Full Code      Disposition Plan   Expected Discharge: 05/11/2022     Anticipated discharge location:  Awaiting care coordination huddle  Delays:            The patient's care was discussed with the Patient and Patient's Family.    Carmen Driscoll MD  Hospitalist Service, [address]  Securely message with the Vocera Web Console (learn more here)  Text page via [facility] Paging/Directory   Please see signed in provider for up to date coverage information      Clinically Significant Risk Factors Present on Admission                    ______________________________________________________________________    Interval History   Nursing notes reviewed. No acute events overnight.     Data reviewed today: I reviewed all medications, new labs and imaging results over the last 24 hours. I personally reviewed .    Physical Exam   Vital Signs: Temp: 97.4  F (36.3  C) Temp src: Oral BP: (!) 150/68 Pulse: 84   Resp: 18 SpO2: 95 % O2 Device: None (Room air)    Weight: 140 lbs 10.46 oz  Physical Exam  General: Awake, alert, conversant, NAD  Pulm: No increased wob, no wheeze  Abd: Soft, NTND  Neuro: AOx3, disoriented to place    Data

## 2022-05-11 ENCOUNTER — APPOINTMENT (OUTPATIENT)
Dept: PHYSICAL THERAPY | Facility: CLINIC | Age: 62
DRG: 637 | End: 2022-05-11
Payer: MEDICARE

## 2022-05-11 LAB
GLUCOSE BLDC GLUCOMTR-MCNC: 121 MG/DL (ref 70–99)
GLUCOSE BLDC GLUCOMTR-MCNC: 130 MG/DL (ref 70–99)
GLUCOSE BLDC GLUCOMTR-MCNC: 186 MG/DL (ref 70–99)
GLUCOSE BLDC GLUCOMTR-MCNC: 221 MG/DL (ref 70–99)
GLUCOSE BLDC GLUCOMTR-MCNC: 97 MG/DL (ref 70–99)
HOLD SPECIMEN: NORMAL
MAGNESIUM SERPL-MCNC: 2 MG/DL (ref 1.6–2.3)
PHOSPHATE SERPL-MCNC: 3.9 MG/DL (ref 2.5–4.5)
POTASSIUM BLD-SCNC: 3.7 MMOL/L (ref 3.4–5.3)
T4 FREE SERPL-MCNC: 1.02 NG/DL (ref 0.76–1.46)
TSH SERPL DL<=0.005 MIU/L-ACNC: 0.36 MU/L (ref 0.4–4)

## 2022-05-11 PROCEDURE — 97116 GAIT TRAINING THERAPY: CPT | Mod: GP

## 2022-05-11 PROCEDURE — 84443 ASSAY THYROID STIM HORMONE: CPT | Performed by: STUDENT IN AN ORGANIZED HEALTH CARE EDUCATION/TRAINING PROGRAM

## 2022-05-11 PROCEDURE — 250N000013 HC RX MED GY IP 250 OP 250 PS 637: Performed by: INTERNAL MEDICINE

## 2022-05-11 PROCEDURE — 250N000013 HC RX MED GY IP 250 OP 250 PS 637: Performed by: STUDENT IN AN ORGANIZED HEALTH CARE EDUCATION/TRAINING PROGRAM

## 2022-05-11 PROCEDURE — 99232 SBSQ HOSP IP/OBS MODERATE 35: CPT | Performed by: STUDENT IN AN ORGANIZED HEALTH CARE EDUCATION/TRAINING PROGRAM

## 2022-05-11 PROCEDURE — 84439 ASSAY OF FREE THYROXINE: CPT | Performed by: STUDENT IN AN ORGANIZED HEALTH CARE EDUCATION/TRAINING PROGRAM

## 2022-05-11 PROCEDURE — 36415 COLL VENOUS BLD VENIPUNCTURE: CPT | Performed by: STUDENT IN AN ORGANIZED HEALTH CARE EDUCATION/TRAINING PROGRAM

## 2022-05-11 PROCEDURE — 84132 ASSAY OF SERUM POTASSIUM: CPT | Performed by: STUDENT IN AN ORGANIZED HEALTH CARE EDUCATION/TRAINING PROGRAM

## 2022-05-11 PROCEDURE — 250N000011 HC RX IP 250 OP 636: Performed by: STUDENT IN AN ORGANIZED HEALTH CARE EDUCATION/TRAINING PROGRAM

## 2022-05-11 PROCEDURE — 97530 THERAPEUTIC ACTIVITIES: CPT | Mod: GP

## 2022-05-11 PROCEDURE — 84100 ASSAY OF PHOSPHORUS: CPT | Performed by: STUDENT IN AN ORGANIZED HEALTH CARE EDUCATION/TRAINING PROGRAM

## 2022-05-11 PROCEDURE — 120N000002 HC R&B MED SURG/OB UMMC

## 2022-05-11 PROCEDURE — 83735 ASSAY OF MAGNESIUM: CPT | Performed by: STUDENT IN AN ORGANIZED HEALTH CARE EDUCATION/TRAINING PROGRAM

## 2022-05-11 RX ORDER — NYSTATIN 100000 U/G
CREAM TOPICAL 2 TIMES DAILY
Status: DISCONTINUED | OUTPATIENT
Start: 2022-05-11 | End: 2022-05-14 | Stop reason: HOSPADM

## 2022-05-11 RX ORDER — POTASSIUM CHLORIDE 20MEQ/15ML
10 LIQUID (ML) ORAL ONCE
Status: COMPLETED | OUTPATIENT
Start: 2022-05-11 | End: 2022-05-11

## 2022-05-11 RX ADMIN — INSULIN ASPART 6 UNITS: 100 INJECTION, SOLUTION INTRAVENOUS; SUBCUTANEOUS at 22:28

## 2022-05-11 RX ADMIN — POTASSIUM CHLORIDE 10 MEQ: 20 SOLUTION ORAL at 08:24

## 2022-05-11 RX ADMIN — INSULIN ASPART: 100 INJECTION, SOLUTION INTRAVENOUS; SUBCUTANEOUS at 14:59

## 2022-05-11 RX ADMIN — CYANOCOBALAMIN TAB 500 MCG 1000 MCG: 500 TAB at 09:14

## 2022-05-11 RX ADMIN — NIFEDIPINE 30 MG: 30 TABLET, FILM COATED, EXTENDED RELEASE ORAL at 09:14

## 2022-05-11 RX ADMIN — ASPIRIN 81 MG CHEWABLE TABLET 81 MG: 81 TABLET CHEWABLE at 09:14

## 2022-05-11 RX ADMIN — DOXAZOSIN 1 MG: 1 TABLET ORAL at 21:07

## 2022-05-11 RX ADMIN — DULOXETINE 20 MG: 20 CAPSULE, DELAYED RELEASE ORAL at 09:13

## 2022-05-11 RX ADMIN — THIAMINE HCL TAB 100 MG 100 MG: 100 TAB at 09:13

## 2022-05-11 RX ADMIN — INSULIN ASPART: 100 INJECTION, SOLUTION INTRAVENOUS; SUBCUTANEOUS at 10:45

## 2022-05-11 RX ADMIN — PREGABALIN 75 MG: 75 CAPSULE ORAL at 09:27

## 2022-05-11 RX ADMIN — Medication 15 ML: at 09:14

## 2022-05-11 RX ADMIN — Medication 25 MCG: at 09:14

## 2022-05-11 RX ADMIN — ATORVASTATIN CALCIUM 40 MG: 40 TABLET, FILM COATED ORAL at 09:14

## 2022-05-11 RX ADMIN — HEPARIN SODIUM 5000 UNITS: 5000 INJECTION, SOLUTION INTRAVENOUS; SUBCUTANEOUS at 21:25

## 2022-05-11 RX ADMIN — CARVEDILOL 6.25 MG: 6.25 TABLET, FILM COATED ORAL at 18:53

## 2022-05-11 RX ADMIN — LEVETIRACETAM 500 MG: 500 TABLET, FILM COATED ORAL at 21:07

## 2022-05-11 RX ADMIN — NYSTATIN: 100000 CREAM TOPICAL at 23:47

## 2022-05-11 RX ADMIN — MENTHOL 1 PATCH: 205.5 PATCH TOPICAL at 21:22

## 2022-05-11 RX ADMIN — HEPARIN SODIUM 5000 UNITS: 5000 INJECTION, SOLUTION INTRAVENOUS; SUBCUTANEOUS at 08:25

## 2022-05-11 RX ADMIN — PREGABALIN 75 MG: 75 CAPSULE ORAL at 21:06

## 2022-05-11 RX ADMIN — LEVOTHYROXINE SODIUM 88 MCG: 88 TABLET ORAL at 08:24

## 2022-05-11 RX ADMIN — LEVETIRACETAM 500 MG: 5 INJECTION INTRAVENOUS at 08:24

## 2022-05-11 RX ADMIN — CARVEDILOL 6.25 MG: 6.25 TABLET, FILM COATED ORAL at 09:14

## 2022-05-11 ASSESSMENT — ACTIVITIES OF DAILY LIVING (ADL)
ADLS_ACUITY_SCORE: 43

## 2022-05-11 NOTE — PROGRESS NOTES
[facility]    Medicine Progress Note - Hospitalist Service, GOLD TEAM 10    Date of Admission:  5/3/2022    Assessment & Plan            Isabell Matthews is a 61 year old female, with h/o acute ischemic stroke of right frontopperital punctate stroke due to small-vessel occlusion (was hospitalized in 4/5/22), multiple lacunar and punctate ischemic infarcts and multifocal moderate to severe stenosis in multiple vascular territories, seizure disorder, DMII, CKD3, HTN, HLD, CAD s/p PCI, hypothyroidism, vascular dementia, presenting with AMS/aphasia, found to have hyperglycemia 2/2 DKA and elevated LA. Per family, patient has had increasing difficulty with PO intake with acute decline three days prior to admission in setting of altered mental status. Following admission, patient treated with DKA protocol with transition to subcutaneous insulin regimen per endocrinology. Infectious workup significant for potential UTI with completion of 7 day total course of therapy. Nutrition supplemented via enteral tube feeds, however, patient pulled out NG tube 2 times over a 48-hour period.  Patient continued to improve to baseline mental status with fluctuating PO intake.     Changes today   - Psychology consulted for 5/12      # DKA, resolved  # UTI 2/2 aerococcus  # H/o DMII (No DEMETRIA ab/C-peptide)  Her glucose was 686, ketones 5.4, hgba1c 8.9. Had DKA in 10/2020. Per Endo, suspect insulin dependent DM II. Possibly provoked in setting of UTI: UA showing bacteria and WB 15 with small LE, urine culture with growth of aerococcus. Patient completed 7 day total course of IV antibiotics while inpatient. Infectious workup including COVID, flu, RVP otherwise reassuring. Notably, daughter/ primary caregiver reports recent difficulty with maintaining adequate PO intake with skipped insulin evening 5/1, daytime 5/2 due to decreased appetite and concern for precipitating hypoglycemia.  - Endocrine  consulted, now signed off   - Recs for discharge regimen to home vs TCU in endo progress note dated 5/10    # Malnutrition  # Severe protein calorie malnutrition in context of acute illness  # Poor PO  Fluctuating PO intake prior to admission and during hospitalization. No concern for dysphagia or aspiration on swallow eval.  - Level of malnutrition: Severe        # AGMA with uncompensated respiratory acidosis  # Hyperkalemia  # Elevated lactate    # Acute on Chronic kidney disease  Cr 1.84 on admission. Cr baseline 1.2-1.45. 2/2 volume depletion 2/2 DKA.     # Acute metabolic toxic encephalopathy  # Aphasia  # Concern for acute stroke  Upon presentation to the ED, there was concern for aphasia and stroke code was called, CT head showed no acute intracranial hemorrhages, showed small chronic infarcts. CTA head showed no intracranial arterial large vessel occlusion, and CT neck showed left carotid bifurcation moderate stenosis, atherosclerotic plaque results in less than 50% stenosis in the right carotid bulb and proximal cervical ICA, chronic right vertebral artery and proximal V1 with severe stenosis, but overall no significant interval change compared to CTA head and neck 04/10/2022. Not given TPA due to history of intracranial hemorrhage. EEG showing evidence of diffuse slow-wave.  MRI nonrevealing for acute stroke.    - Improved following treatment of DKA, UTI as above     --Chronic diseases--   #Seizure disorder  - PTA Keppra 500 mg BID, resume PO     #CKD3  - BMP daily     #HTN  #CAD s/p PCI  #HLD  On admission /77.  - PTA coreg 6.25 mg  - Resume PTA nifedipine 60 mg daily from 30 mg daily)  - PTA atrova 40 mg daily  - Continue PRN hydralzine 10 mg q6h, fpr SBP >170     #Hypothyroidism  - PTA synthroid 88 mcg daily  - Repeat TSH w/ reflew     #Vascular dementia  - Delirium precautions     # MDD  - pta duloxetine 20 mg daily     # Urinary retention  - pta doxazosin       Diet: Easy to Chew Diet (level  7)  Snacks/Supplements Adult: Glucerna; With Meals  Room Service  Snacks/Supplements Adult: Other; See list below; Between MealsRegular diet, easy to chew with thin liquids  DVT Prophylaxis: Pneumatic Compression Devices  Parker Catheter: Not present  Central Lines: None  Cardiac Monitoring: None  Code Status: Full Code      Disposition Plan   Expected Discharge: 05/11/2022     Anticipated discharge location:  Awaiting care coordination huddle  Delays:     Other (Add Comment)          The patient's care was discussed with the Care Coordinator/, Patient and Patient's Family.    Carmen Driscoll MD  Hospitalist Service, GOLD TEAM [address] [place] [address]  Securely message with the Vocera Web Console (learn more here)  Text page via [facility] Paging/Directory   Please see signed in provider for up to date coverage information      Clinically Significant Risk Factors Present on Admission                    ______________________________________________________________________    Interval History   Nursing notes reviewed. No acute events overnight. Poor PO intake on 5/10 with 365 kcal documented. Per daughters, has progressively worsened in this regard with patient holding food in her mouth at times and requiring multiple prompts to swallow. Patient was otherwise able to engage in conversations and noted in prior conversation feeling financially, physically, and emotionally secure/safe. When asked about symptoms of depression she denies depressed mood but does note that she struggles with it at times. She feels that she is able to keep shake off depression. She has episodic withdrawal or periods where she declines to interact but was highly alert and conversant today.    4 point ROS is otherwise negative.    Data reviewed today: I reviewed all medications, new labs and imaging results over the last 24 hours. I personally reviewed .    Physical Exam   Vital Signs:  Temp: 97.5  F (36.4  C) Temp src: Oral BP: 117/61 Pulse: 75   Resp: 16 SpO2: 96 % O2 Device: None (Room air)    Weight: 137 lbs 9.07 oz  Physical Exam  General: Awake, alert, conversant, NAD  Pulm: No increased wob, no wheeze  Abd: Soft, NTND  Neuro: AOx3, disoriented to place    Data

## 2022-05-11 NOTE — PLAN OF CARE
[id number] Both daughter present at bedside today. Pt oriented to place, self and intermittently oriented to time. Delirium prevention in place. Responds to commands. Perineum area reddened. No brief in bed and nystatin cream ordered, kateck overnight. Worked with therapy today. Ambulated from doorway to bed with daughter. Possible discharge Friday. On easy chew diet with thin liquids. Does need supervision and promoting. Had coughing episode x2 today. Needs reminders to take smaller bites and not to take another bite until she swallows. Lungs clear and diminished at bases. Continue to encourage oral hygiene.    Goal Outcome Evaluation:    Plan of Care Reviewed With: patient, daughter     Overall Patient Progress: improving

## 2022-05-11 NOTE — PROGRESS NOTES
Calorie Count  Intake recorded for: 5/9  Total Kcals: 40 Total Protein: 1g  Kcals from Hospital Food: 40   Protein: 1g  Kcals from Outside Food (average):0 Protein: 0g  # Meals Ordered from Kitchen: 3 meals   # Meals Recorded: 1 meal (First - 25% carrots, <25% mashed potatoes, macaroni and cheese)  # Supplements Recorded: 0

## 2022-05-11 NOTE — PLAN OF CARE
Assumed Cares: [id number]      Significant Events: No acute events this shift, pt appeared resting between cares.     Vitals: VSS on RA  LDAs: New PIV infusing TKO, Purwick in place with good output.   Neuro: AxOXx4 this shift, extremely slow and delibrate swallowing pills, took over five minutes to swallow pills at 8PM med pass. Withdrawn affect.   Respiratory: Diminished in lower lobes, free of cough this shift.   Cardiac: WDL  GI/: No complaints of nausea this shift, incontinent of bowel and bladder. Purwick changed and with smear BM this shift.  Mobility: x2 if OOB, able to shift in bed.   Nutrition: Extremely poor PO intake with dinner, pt ate approximately 10% of her food. BG within range.   Pain: Denies     Plan of care:  Continue with POC, update treatment team as needed with changes.     Goal Outcome Evaluation:    Plan of Care Reviewed With: patient     Overall Patient Progress: improving

## 2022-05-11 NOTE — PROGRESS NOTES
Calorie Count  Intake recorded for: 5/10  Total Kcals: 365 Total Protein: 14g  Kcals from Hospital Food: 365   Protein: 14g  Kcals from Outside Food (average):0 Protein: 0g  # Meals Ordered from Kitchen: 2 meals  # Meals Recorded: 2 meals (First - 100% sausage, 25% blueberry muffin, scrambled eggs)      (Second - 50% mashed potatoes, 25% ice cream, coffee w/ cream)  # Supplements Recorded: 0

## 2022-05-11 NOTE — PROGRESS NOTES
SPIRITUAL HEALTH SERVICES  SPIRITUAL ASSESSMENT Progress Note  [facility] ([place]) 5B     Referral Source: Initial Visit ( initiated per length of stay)    PRIMARY FOCUS:   Support for coping    Reviewed documentation. Reflective conversation shared with pt Lisa, and her daughter Maria Guadalupe which integrated elements of illness and family narratives. Maria Guadalupe shared that Lisa lives with her and that Maria Guadalupe is her caregiver. Lisa and Maria Guadalupe were receptive to  visit. Lisa spoke softly when answering questions and did not say much. Maria Guadalupe and Lisa both stated that they are not connected to a [place] community. When Maria Guadalupe asked Lisa if she wanted to be connected to one, Lisa shook her head no.     Maria Guadalupe requested a puzzle from the Patient Resource Library. However, when the  went the resource library had already closed for the day.  was able to bring Lisa and Maria Guadalupe other materials including coloring pages, crayons and a purple pillowcase. Informed Maria Gudaalupe and Lisa that unit staff could help them access more resources from the library when it reopens tomorrow.      oriented Lisa and her daughter Maria Guadalupe to spiritual health services and educated them on how to request further  support as desired.       Plan: Spiritual health services remains available for any follow-up or requests.     [facility] remains available 24/7 for emergent requests/referrals, either by having the switchboard page the on-call  or by entering an ASAP/STAT consult in Epic (this will also page the on-call ).    __    Rabbi Anderson Coleman  Chaplain Resident  Pager [phone]

## 2022-05-11 NOTE — PROGRESS NOTES
CLINICAL NUTRITION SERVICES - REASSESSMENT NOTE     Nutrition Prescription    RECOMMENDATIONS FOR MDs/PROVIDERS TO ORDER:  Continue with diet as tolerated + added oral nutrition supplements to optimize intake     Malnutrition Status:    Severe malnutrition in the context of chronic illness    Recommendations already ordered by Registered Dietitian (RD):  Ordered Glucerna with each meal trays to potentially optimize intake ( chocolate and vanilla flavor)  Ordered snacks in between meals:  10:00 am: yogurt   2:00 pm: cottage cheese with canned peaches  HS: bowl of egg salad     Future/Additional Recommendations:  PO adequacy, Mentation improvement       EVALUATION OF THE PROGRESS TOWARD GOALS   Diet:   Level 7: Easy to Chew Dysphagia Diet ( started on 5/6)     Nutrition Support ( from 5/5-5/6)  - Per MD note, mentation was altered for x 3 days during admission.  NG tube was started and nutrition advanced.  Patient pulled out NG tube 2 times over a 48-hour period.    Intake:   Pt ate dinner with her daughter. Extremely poor PO intake with dinner, pt ate approximately 10% of her food. Pt very slow with swallowing and needs 1:1 supervision with eating.   Per flow sheet review: patient ate 75% of her bkf this am, 10% of dinner last night, 25% of lunch yesterday.   Intake has been variable ( % of meals)    Patient was busy with nursing care. Talked to patient's daughter. Patient has had a decline in po and appetite since first stroke last year with worsening mentation and dysphagia since last stroke in   April. Patient's intake has been low / minimal since April. Family in agreement with a trial of oral nutrition drinks to help optimize intake. Also encouraged small frequent meals to help with improve intake.     Calorie count noted:  5/10: Data N/A   5/9: Data N/A   5/8: 1100 kcal and 49 gm protein ( from 3 meals)  1 day intake met 73% estimated kcal needs and 82% protein needs for maintenance     Estimated Energy  "Needs: 1500- 1800 kcals/day ( 25-30 kcals/kg )  Justification: Maintenance   Estimated Protein Needs: 60- 72 grams protein/day (1.0-1.2 grams of pro/kg)  Justification: Maintenance       NEW FINDINGS   Chart reviewed:   - PMH of seizure disorder, DM2, insulin dependent / uncontrolled ( A1C: 8.9), CKD3, CAD s/p PCI, vascular dementia, multiple prior ischemic strokes and recent acute ischemic stroke (was hospitalized in 4/5/22),   - Presenting with AMS/aphasia and found to have hyperglycemia + DKA in setting of lack of insulin and UTI  - Per MD note: \" Mentation has improved on IV antibiotics and adequate nutrition.  Patient continues to have some residual cognitive deficits including slowed/delayed speech\".  - Transferred to Medicine unit on 5/9    Wt trend:  59.5 kg (131 lb 2.8 oz) admit wt on 5/4 --> 62.4 kg (137 lb 9.1 oz) on 5/10  Previously wt was: 75.8 kg on 7/5/21.     MALNUTRITION  % Intake: < 75% for > 7 days (moderate)  % Weight Loss: Previously > 10% in 6 month (severe)  Subcutaneous Fat Loss: Facial region: mild   Muscle Loss:  Temporal: mild, Upper arm (bicep, tricep): mild, Dorsal hand: mild, Upper leg (quadricep, hamstring): moderate and Posterior calf: moderate  Fluid Accumulation/Edema: None noted  Malnutrition Diagnosis: Severe malnutrition in the context of chronic illness    Previous Goals   Diet adv v nutrition support within 2-3 days.  Evaluation: Met    Previous Nutrition Diagnosis  Inadequate oral intake related to NPO as evidenced by meeting 0% nutrition needs currently     Evaluation: Improving    CURRENT NUTRITION DIAGNOSIS  Inadequate oral intake related to slow improvement on mentation as evidenced by consuming <75% of meals     INTERVENTIONS  Implementation  Medical food supplement therapy    Goals  Patient to consume % of nutritionally adequate meal trays TID, or the equivalent with supplements/snacks.    Monitoring/Evaluation  Progress toward goals will be monitored and evaluated " per protocol.    Gloria Cotter RD/LOLITA  Pager [phone]

## 2022-05-11 NOTE — PROGRESS NOTES
Care Management Follow Up    Length of Stay (days): 8  Expected Discharge Date:  Medically ready  Concerns to be Addressed:  Discharge planning  Patient plan of care discussed at interdisciplinary rounds: No    Anticipated Discharge Disposition: home   Anticipated Discharge Services:  Home care - she is open to [facility] (RN/PT/OT) Phone: [phone]. They need a call day of discharge and have access to epic  Anticipated Discharge DME:        Additional Information:  Per [facility] provider - patient is medically ready for discharge. Does not have any TF, TPN, IV AB.  Pt and daughter were in agreement with TCU. Updated [facility] [phone] regarding TCU recs.      Called pt room and spoke with pt and daughterFelton. They are under impression that pt will discharge to a TCU. Felton does not live locally and asked RNCC to contact pt daughterVishal to get TCU preferences. Left VM with Nehalleleilsa.    1:25PM  Spoke with daughterVishal by phone. She lives with patient and did not have a conversation with MD about discharge plans. Pt has had bad experiences at TCU with blood sugar control and rehospitalizations. Vishal is willing to have referral to [facility]. If they decline, she may prefer to take her mother home. She wants to be POC to coordinate discharge plans. RNCC made referral to [facility] and will reach out to Mel regarding outcome and next steps.     2:15PM  [facility] has declined. PT updated recs today for home with home care. Attempted to discuss with zoila Rodgers - she was busy and needed a call back.     Confirmed with [facility] that patient is open to them for RN/PT/OT. Added resumption orders and ST to eval and treat.Team paged regarding the above info.     3PM  Per Gold team pt having poor PO intake and will stay likely through Fri 5/13 and discharge home. Spoke with Vishal who confirmed plans with resumption of home care.       Conchita Burks, RN, [place]  Tianna  Care Coordinator  Covering 5B [facility]   Pager: [phone]

## 2022-05-11 NOTE — PLAN OF CARE
BP (!) 145/64   Pulse 72   Temp 97.9  F (36.6  C) (Oral)   Resp 17   Wt 62.4 kg (137 lb 9.1 oz)   SpO2 95%   BMI 24.37 kg/m      Assumed care 1500 to 1930  Pt rounded on hourly  Glenn: alert oriented to self disoriented to time, place, and situation.  pt slow to respond has some withdrawal   Pain: denies  GI/: Denies nausea. Bowel sounds present. Good urine output clear yellow urine pt incontinent. Purewic in place. Pt ate dinner with her daughter. Pt very slow with swallowing, Pt took over five minutes to swallow one medication. Pt very deliberate with swallowing. Pt is on and easy to chew diet. Needs 1:1 supervision with eating  Cardiac: WDL  Respiratory: denies SOB lung sounds clear bilaterally diminished lower lobes   Skin: clean dry and intact growth on right ear. Redness to groin, barrier cream applied   Lines: PIV saline locked  Assist level: 2 assit walker and gait belt   Will continue to monitor and follow plan of care.   Plan: TCU placement     Call light in reach, Pt able to make needs known, Will continue to monitor and follow plan of care.      Goal Outcome Evaluation:     Plan of Care Reviewed With: pts zoila Talbert     Overall Patient Progress: progressing     Outcome Evaluation: working with PT/OT, working on swallowing

## 2022-05-12 ENCOUNTER — APPOINTMENT (OUTPATIENT)
Dept: PHYSICAL THERAPY | Facility: CLINIC | Age: 62
DRG: 637 | End: 2022-05-12
Payer: MEDICARE

## 2022-05-12 ENCOUNTER — APPOINTMENT (OUTPATIENT)
Dept: SPEECH THERAPY | Facility: CLINIC | Age: 62
DRG: 637 | End: 2022-05-12
Payer: MEDICARE

## 2022-05-12 ENCOUNTER — APPOINTMENT (OUTPATIENT)
Dept: OCCUPATIONAL THERAPY | Facility: CLINIC | Age: 62
DRG: 637 | End: 2022-05-12
Payer: MEDICARE

## 2022-05-12 LAB
ANION GAP SERPL CALCULATED.3IONS-SCNC: 7 MMOL/L (ref 3–14)
BUN SERPL-MCNC: 18 MG/DL (ref 7–30)
CALCIUM SERPL-MCNC: 8.6 MG/DL (ref 8.5–10.1)
CHLORIDE BLD-SCNC: 107 MMOL/L (ref 94–109)
CO2 SERPL-SCNC: 26 MMOL/L (ref 20–32)
CREAT SERPL-MCNC: 1.4 MG/DL (ref 0.52–1.04)
ERYTHROCYTE [DISTWIDTH] IN BLOOD BY AUTOMATED COUNT: 15.5 % (ref 10–15)
GFR SERPL CREATININE-BSD FRML MDRD: 42 ML/MIN/1.73M2
GLUCOSE BLD-MCNC: 270 MG/DL (ref 70–99)
GLUCOSE BLDC GLUCOMTR-MCNC: 174 MG/DL (ref 70–99)
GLUCOSE BLDC GLUCOMTR-MCNC: 190 MG/DL (ref 70–99)
GLUCOSE BLDC GLUCOMTR-MCNC: 215 MG/DL (ref 70–99)
GLUCOSE BLDC GLUCOMTR-MCNC: 249 MG/DL (ref 70–99)
GLUCOSE BLDC GLUCOMTR-MCNC: 308 MG/DL (ref 70–99)
GLUCOSE BLDC GLUCOMTR-MCNC: 89 MG/DL (ref 70–99)
GLUCOSE BLDC GLUCOMTR-MCNC: 94 MG/DL (ref 70–99)
HCT VFR BLD AUTO: 32.4 % (ref 35–47)
HGB BLD-MCNC: 10 G/DL (ref 11.7–15.7)
MAGNESIUM SERPL-MCNC: 2.1 MG/DL (ref 1.6–2.3)
MCH RBC QN AUTO: 28.5 PG (ref 26.5–33)
MCHC RBC AUTO-ENTMCNC: 30.9 G/DL (ref 31.5–36.5)
MCV RBC AUTO: 92 FL (ref 78–100)
PHOSPHATE SERPL-MCNC: 4.2 MG/DL (ref 2.5–4.5)
PLATELET # BLD AUTO: 271 10E3/UL (ref 150–450)
POTASSIUM BLD-SCNC: 4.3 MMOL/L (ref 3.4–5.3)
POTASSIUM BLD-SCNC: 4.6 MMOL/L (ref 3.4–5.3)
RBC # BLD AUTO: 3.51 10E6/UL (ref 3.8–5.2)
SODIUM SERPL-SCNC: 140 MMOL/L (ref 133–144)
WBC # BLD AUTO: 5.1 10E3/UL (ref 4–11)

## 2022-05-12 PROCEDURE — 250N000013 HC RX MED GY IP 250 OP 250 PS 637: Performed by: STUDENT IN AN ORGANIZED HEALTH CARE EDUCATION/TRAINING PROGRAM

## 2022-05-12 PROCEDURE — 92526 ORAL FUNCTION THERAPY: CPT | Mod: GN

## 2022-05-12 PROCEDURE — 85014 HEMATOCRIT: CPT | Performed by: STUDENT IN AN ORGANIZED HEALTH CARE EDUCATION/TRAINING PROGRAM

## 2022-05-12 PROCEDURE — 83735 ASSAY OF MAGNESIUM: CPT | Performed by: STUDENT IN AN ORGANIZED HEALTH CARE EDUCATION/TRAINING PROGRAM

## 2022-05-12 PROCEDURE — 36415 COLL VENOUS BLD VENIPUNCTURE: CPT | Performed by: STUDENT IN AN ORGANIZED HEALTH CARE EDUCATION/TRAINING PROGRAM

## 2022-05-12 PROCEDURE — 97530 THERAPEUTIC ACTIVITIES: CPT | Mod: GO

## 2022-05-12 PROCEDURE — 84100 ASSAY OF PHOSPHORUS: CPT | Performed by: STUDENT IN AN ORGANIZED HEALTH CARE EDUCATION/TRAINING PROGRAM

## 2022-05-12 PROCEDURE — 97116 GAIT TRAINING THERAPY: CPT | Mod: GP | Performed by: REHABILITATION PRACTITIONER

## 2022-05-12 PROCEDURE — 84132 ASSAY OF SERUM POTASSIUM: CPT | Performed by: STUDENT IN AN ORGANIZED HEALTH CARE EDUCATION/TRAINING PROGRAM

## 2022-05-12 PROCEDURE — 250N000011 HC RX IP 250 OP 636: Performed by: STUDENT IN AN ORGANIZED HEALTH CARE EDUCATION/TRAINING PROGRAM

## 2022-05-12 PROCEDURE — 97530 THERAPEUTIC ACTIVITIES: CPT | Mod: GP | Performed by: REHABILITATION PRACTITIONER

## 2022-05-12 PROCEDURE — 258N000003 HC RX IP 258 OP 636: Performed by: STUDENT IN AN ORGANIZED HEALTH CARE EDUCATION/TRAINING PROGRAM

## 2022-05-12 PROCEDURE — 250N000013 HC RX MED GY IP 250 OP 250 PS 637: Performed by: INTERNAL MEDICINE

## 2022-05-12 PROCEDURE — 999N000128 HC STATISTIC PERIPHERAL IV START W/O US GUIDANCE

## 2022-05-12 PROCEDURE — 120N000002 HC R&B MED SURG/OB UMMC

## 2022-05-12 PROCEDURE — 99232 SBSQ HOSP IP/OBS MODERATE 35: CPT | Performed by: STUDENT IN AN ORGANIZED HEALTH CARE EDUCATION/TRAINING PROGRAM

## 2022-05-12 RX ORDER — LABETALOL HYDROCHLORIDE 5 MG/ML
20 INJECTION, SOLUTION INTRAVENOUS EVERY 6 HOURS PRN
Status: DISCONTINUED | OUTPATIENT
Start: 2022-05-12 | End: 2022-05-14 | Stop reason: HOSPADM

## 2022-05-12 RX ADMIN — HEPARIN SODIUM 5000 UNITS: 5000 INJECTION, SOLUTION INTRAVENOUS; SUBCUTANEOUS at 21:40

## 2022-05-12 RX ADMIN — HEPARIN SODIUM 5000 UNITS: 5000 INJECTION, SOLUTION INTRAVENOUS; SUBCUTANEOUS at 07:58

## 2022-05-12 RX ADMIN — INSULIN ASPART 1 UNITS: 100 INJECTION, SOLUTION INTRAVENOUS; SUBCUTANEOUS at 13:40

## 2022-05-12 RX ADMIN — PREGABALIN 75 MG: 75 CAPSULE ORAL at 10:32

## 2022-05-12 RX ADMIN — INSULIN ASPART 1 UNITS: 100 INJECTION, SOLUTION INTRAVENOUS; SUBCUTANEOUS at 09:59

## 2022-05-12 RX ADMIN — LEVETIRACETAM 500 MG: 500 TABLET, FILM COATED ORAL at 07:58

## 2022-05-12 RX ADMIN — LEVETIRACETAM 500 MG: 500 TABLET, FILM COATED ORAL at 21:41

## 2022-05-12 RX ADMIN — PREGABALIN 75 MG: 75 CAPSULE ORAL at 21:41

## 2022-05-12 RX ADMIN — NYSTATIN: 100000 CREAM TOPICAL at 22:10

## 2022-05-12 RX ADMIN — Medication 1 MG: at 21:41

## 2022-05-12 RX ADMIN — MENTHOL 1 PATCH: 205.5 PATCH TOPICAL at 22:15

## 2022-05-12 RX ADMIN — CARVEDILOL 6.25 MG: 6.25 TABLET, FILM COATED ORAL at 18:15

## 2022-05-12 RX ADMIN — DULOXETINE 20 MG: 20 CAPSULE, DELAYED RELEASE ORAL at 10:33

## 2022-05-12 RX ADMIN — SODIUM CHLORIDE, POTASSIUM CHLORIDE, SODIUM LACTATE AND CALCIUM CHLORIDE 1000 ML: 600; 310; 30; 20 INJECTION, SOLUTION INTRAVENOUS at 22:45

## 2022-05-12 RX ADMIN — INSULIN ASPART 2 UNITS: 100 INJECTION, SOLUTION INTRAVENOUS; SUBCUTANEOUS at 18:31

## 2022-05-12 RX ADMIN — Medication 15 ML: at 09:49

## 2022-05-12 RX ADMIN — DOXAZOSIN 1 MG: 1 TABLET ORAL at 21:41

## 2022-05-12 RX ADMIN — LEVOTHYROXINE SODIUM 88 MCG: 88 TABLET ORAL at 07:58

## 2022-05-12 RX ADMIN — NYSTATIN: 100000 CREAM TOPICAL at 09:57

## 2022-05-12 ASSESSMENT — ACTIVITIES OF DAILY LIVING (ADL)
ADLS_ACUITY_SCORE: 43
ADLS_ACUITY_SCORE: 47
ADLS_ACUITY_SCORE: 43
ADLS_ACUITY_SCORE: 47
ADLS_ACUITY_SCORE: 43
ADLS_ACUITY_SCORE: 43
ADLS_ACUITY_SCORE: 47
ADLS_ACUITY_SCORE: 43

## 2022-05-12 NOTE — PROVIDER NOTIFICATION
Low 5B R.M BP little soft 102/50 and 103/57 on recheck. Pulse elevated in the 100's. OK to give 6.25 mg coreg? Nicole MARTEL [id number]      Response: OK to give.

## 2022-05-12 NOTE — PROVIDER NOTIFICATION
"Notified Provider: Carmen Driscoll     This morning patient was struggling swallowing medications. She did take 2-3 medications orally. Writer then decided to crush remaining medications. After remaining medications were crushed writer paused to double check MAR to see if all medications could be given crushed. Nifedpime cannot be crushed. Pt did not receive any crushed medications. Writer was unable to identify what medications were crushed.     Overall did not know for sure if she receive blood pressure medications.     Changes made in MAR; marked medications as \"cancelled entry\" and left comment.     Notified provider. Plan to re check blood pressure q4hr and treat as need with PRN IV medications.   "

## 2022-05-12 NOTE — PROVIDER NOTIFICATION
(1240) Low 5B R.M 33-2 Did you want CBC, BMP today? Just checked her lytes per the RN managed order set. Also is lethargic this afternoon. I.e not awake enough to eat. No  acute changes noted on neuro exam Nicole RN [phone]    Response: No new orders placed    1700: Provider + writer both at bedside. Continued to be intermittent lethargic but is responsive. Labs ordered. Likely baseline.

## 2022-05-12 NOTE — PLAN OF CARE
RN assumed cares at 1900 to 0700    Vitals: Temp: 98  F (36.7  C) Temp src: Oral BP: 134/62 Pulse: 89   Resp: 16 SpO2: 94 % O2 Device: None (Room air)    Pain: Pain in L shoulder IcyHot patch given, pain still reported but pt slept during shift  Neuro: slow, to speak, disoriented to place (pt said she was in a nursing home), but got all other orientation questions correct. Pt ate dinner from before 1900 to after 2300. Pt would hold food in her mouth for extended periods of time, and sometimes put more food into her mouth before swallowing the previous bite. Pt would cough during meal. Slow to initiate swallowing. Speech consult recommended/scheduled for 0800. Pt has history of pulling our NG tube 2x this stay  Cardiac: WDL  Respiratory: stable on room air  GI/: incontinent of bowel and bladder, pure wick in place  IV/Drains: 1 PIV saline locked  Activity: stayed in bed all shift, 1 assist to roll in bed/incontinence cares, 2 assist to get OOB  Skin: Blanchable redness in perineum, nystatin started   Labs: BG tests per order    Plan of Care:  Carb counting, watch when eating due to risk of aspiration. Prepare to give meds slowly over extended period of time due to ability to swallow. Consider turning off TV while eating to prevent distraction, remind pt to swallow as needed. Continue to treat pain as needed and able, contact MD as needed

## 2022-05-12 NOTE — PLAN OF CARE
"Occupational Therapy Discharge Summary    Reason for therapy discharge:    No further expectations of functional progress. Upon further history obtained this admit, per SW consult 5/4, \"Spoke with daughterMel by phone who is pt PCA and cares for her full time. Per Mel, pt is a total assist and pretty much needs helps with everything including - med management, bathing, tranfers, transport. She needs to be watched while she eats, otherwise she won't eat. Lately instead of a walker, pt has needed to be moved around by wheel chair.\" Pt demonstrating near baseline ADL function and anticipate return to resumption of HH RN/PT/OT appropriate at this time. PT continued to follow for mobility progression.    Progress towards therapy goal(s). See goals on Care Plan in [facility] electronic health record for goal details.      Therapy recommendation(s):    Continued therapy is recommended.  Rationale/Recommendations:  Pt would benefit from resumption of HH OT to maximize safety upon return home with assist from daughter/PCA.      "

## 2022-05-13 ENCOUNTER — APPOINTMENT (OUTPATIENT)
Dept: PHYSICAL THERAPY | Facility: CLINIC | Age: 62
DRG: 637 | End: 2022-05-13
Payer: MEDICARE

## 2022-05-13 ENCOUNTER — APPOINTMENT (OUTPATIENT)
Dept: GENERAL RADIOLOGY | Facility: CLINIC | Age: 62
DRG: 637 | End: 2022-05-13
Attending: STUDENT IN AN ORGANIZED HEALTH CARE EDUCATION/TRAINING PROGRAM
Payer: MEDICARE

## 2022-05-13 LAB
ANION GAP SERPL CALCULATED.3IONS-SCNC: 4 MMOL/L (ref 3–14)
BASE EXCESS BLDV CALC-SCNC: 1.6 MMOL/L (ref -7.7–1.9)
BUN SERPL-MCNC: 19 MG/DL (ref 7–30)
CALCIUM SERPL-MCNC: 9.2 MG/DL (ref 8.5–10.1)
CHLORIDE BLD-SCNC: 108 MMOL/L (ref 94–109)
CO2 SERPL-SCNC: 28 MMOL/L (ref 20–32)
CREAT SERPL-MCNC: 1.3 MG/DL (ref 0.52–1.04)
ERYTHROCYTE [DISTWIDTH] IN BLOOD BY AUTOMATED COUNT: 15.3 % (ref 10–15)
GFR SERPL CREATININE-BSD FRML MDRD: 46 ML/MIN/1.73M2
GLUCOSE BLD-MCNC: 367 MG/DL (ref 70–99)
GLUCOSE BLDC GLUCOMTR-MCNC: 129 MG/DL (ref 70–99)
GLUCOSE BLDC GLUCOMTR-MCNC: 249 MG/DL (ref 70–99)
GLUCOSE BLDC GLUCOMTR-MCNC: 260 MG/DL (ref 70–99)
GLUCOSE BLDC GLUCOMTR-MCNC: 286 MG/DL (ref 70–99)
GLUCOSE BLDC GLUCOMTR-MCNC: 383 MG/DL (ref 70–99)
HCO3 BLDV-SCNC: 27 MMOL/L (ref 21–28)
HCT VFR BLD AUTO: 33.2 % (ref 35–47)
HGB BLD-MCNC: 10.1 G/DL (ref 11.7–15.7)
KETONES BLD-SCNC: 0 MMOL/L (ref 0–0.6)
LACTATE SERPL-SCNC: 1.5 MMOL/L (ref 0.7–2)
MAGNESIUM SERPL-MCNC: 2.1 MG/DL (ref 1.6–2.3)
MCH RBC QN AUTO: 28.1 PG (ref 26.5–33)
MCHC RBC AUTO-ENTMCNC: 30.4 G/DL (ref 31.5–36.5)
MCV RBC AUTO: 93 FL (ref 78–100)
O2/TOTAL GAS SETTING VFR VENT: 21 %
PCO2 BLDV: 44 MM HG (ref 40–50)
PH BLDV: 7.39 [PH] (ref 7.32–7.43)
PHOSPHATE SERPL-MCNC: 4.4 MG/DL (ref 2.5–4.5)
PLATELET # BLD AUTO: 253 10E3/UL (ref 150–450)
PO2 BLDV: 38 MM HG (ref 25–47)
POTASSIUM BLD-SCNC: 4.5 MMOL/L (ref 3.4–5.3)
RBC # BLD AUTO: 3.59 10E6/UL (ref 3.8–5.2)
SODIUM SERPL-SCNC: 140 MMOL/L (ref 133–144)
WBC # BLD AUTO: 4.2 10E3/UL (ref 4–11)

## 2022-05-13 PROCEDURE — 71045 X-RAY EXAM CHEST 1 VIEW: CPT

## 2022-05-13 PROCEDURE — 90832 PSYTX W PT 30 MINUTES: CPT | Performed by: STUDENT IN AN ORGANIZED HEALTH CARE EDUCATION/TRAINING PROGRAM

## 2022-05-13 PROCEDURE — 36415 COLL VENOUS BLD VENIPUNCTURE: CPT | Performed by: STUDENT IN AN ORGANIZED HEALTH CARE EDUCATION/TRAINING PROGRAM

## 2022-05-13 PROCEDURE — 83605 ASSAY OF LACTIC ACID: CPT | Performed by: STUDENT IN AN ORGANIZED HEALTH CARE EDUCATION/TRAINING PROGRAM

## 2022-05-13 PROCEDURE — 250N000013 HC RX MED GY IP 250 OP 250 PS 637: Performed by: STUDENT IN AN ORGANIZED HEALTH CARE EDUCATION/TRAINING PROGRAM

## 2022-05-13 PROCEDURE — 71045 X-RAY EXAM CHEST 1 VIEW: CPT | Mod: 26 | Performed by: RADIOLOGY

## 2022-05-13 PROCEDURE — 83735 ASSAY OF MAGNESIUM: CPT | Performed by: STUDENT IN AN ORGANIZED HEALTH CARE EDUCATION/TRAINING PROGRAM

## 2022-05-13 PROCEDURE — 250N000013 HC RX MED GY IP 250 OP 250 PS 637: Performed by: INTERNAL MEDICINE

## 2022-05-13 PROCEDURE — 99232 SBSQ HOSP IP/OBS MODERATE 35: CPT | Performed by: STUDENT IN AN ORGANIZED HEALTH CARE EDUCATION/TRAINING PROGRAM

## 2022-05-13 PROCEDURE — 120N000002 HC R&B MED SURG/OB UMMC

## 2022-05-13 PROCEDURE — 250N000011 HC RX IP 250 OP 636: Performed by: STUDENT IN AN ORGANIZED HEALTH CARE EDUCATION/TRAINING PROGRAM

## 2022-05-13 PROCEDURE — 82803 BLOOD GASES ANY COMBINATION: CPT | Performed by: STUDENT IN AN ORGANIZED HEALTH CARE EDUCATION/TRAINING PROGRAM

## 2022-05-13 PROCEDURE — 85027 COMPLETE CBC AUTOMATED: CPT | Performed by: STUDENT IN AN ORGANIZED HEALTH CARE EDUCATION/TRAINING PROGRAM

## 2022-05-13 PROCEDURE — 82010 KETONE BODYS QUAN: CPT | Performed by: STUDENT IN AN ORGANIZED HEALTH CARE EDUCATION/TRAINING PROGRAM

## 2022-05-13 PROCEDURE — 84100 ASSAY OF PHOSPHORUS: CPT | Performed by: STUDENT IN AN ORGANIZED HEALTH CARE EDUCATION/TRAINING PROGRAM

## 2022-05-13 PROCEDURE — 80048 BASIC METABOLIC PNL TOTAL CA: CPT | Performed by: STUDENT IN AN ORGANIZED HEALTH CARE EDUCATION/TRAINING PROGRAM

## 2022-05-13 RX ADMIN — DULOXETINE 20 MG: 20 CAPSULE, DELAYED RELEASE ORAL at 08:33

## 2022-05-13 RX ADMIN — THIAMINE HCL TAB 100 MG 100 MG: 100 TAB at 08:33

## 2022-05-13 RX ADMIN — DOXAZOSIN 1 MG: 1 TABLET ORAL at 21:59

## 2022-05-13 RX ADMIN — LEVOTHYROXINE SODIUM 88 MCG: 88 TABLET ORAL at 08:33

## 2022-05-13 RX ADMIN — Medication 25 MCG: at 08:33

## 2022-05-13 RX ADMIN — NIFEDIPINE 60 MG: 60 TABLET, EXTENDED RELEASE ORAL at 08:38

## 2022-05-13 RX ADMIN — HEPARIN SODIUM 5000 UNITS: 5000 INJECTION, SOLUTION INTRAVENOUS; SUBCUTANEOUS at 08:33

## 2022-05-13 RX ADMIN — Medication 15 ML: at 08:33

## 2022-05-13 RX ADMIN — ASPIRIN 81 MG CHEWABLE TABLET 81 MG: 81 TABLET CHEWABLE at 08:32

## 2022-05-13 RX ADMIN — INSULIN ASPART 5 UNITS: 100 INJECTION, SOLUTION INTRAVENOUS; SUBCUTANEOUS at 08:35

## 2022-05-13 RX ADMIN — CARVEDILOL 6.25 MG: 6.25 TABLET, FILM COATED ORAL at 18:14

## 2022-05-13 RX ADMIN — Medication 1 MG: at 20:33

## 2022-05-13 RX ADMIN — INSULIN ASPART 5 UNITS: 100 INJECTION, SOLUTION INTRAVENOUS; SUBCUTANEOUS at 18:13

## 2022-05-13 RX ADMIN — ONDANSETRON 4 MG: 2 INJECTION INTRAMUSCULAR; INTRAVENOUS at 19:31

## 2022-05-13 RX ADMIN — LEVETIRACETAM 500 MG: 500 TABLET, FILM COATED ORAL at 20:33

## 2022-05-13 RX ADMIN — LEVETIRACETAM 500 MG: 500 TABLET, FILM COATED ORAL at 08:32

## 2022-05-13 RX ADMIN — PREGABALIN 75 MG: 75 CAPSULE ORAL at 20:33

## 2022-05-13 RX ADMIN — CARVEDILOL 6.25 MG: 6.25 TABLET, FILM COATED ORAL at 08:32

## 2022-05-13 RX ADMIN — NYSTATIN: 100000 CREAM TOPICAL at 08:36

## 2022-05-13 RX ADMIN — NYSTATIN: 100000 CREAM TOPICAL at 20:33

## 2022-05-13 RX ADMIN — HEPARIN SODIUM 5000 UNITS: 5000 INJECTION, SOLUTION INTRAVENOUS; SUBCUTANEOUS at 20:33

## 2022-05-13 RX ADMIN — PREGABALIN 75 MG: 75 CAPSULE ORAL at 08:32

## 2022-05-13 RX ADMIN — MENTHOL 1 PATCH: 205.5 PATCH TOPICAL at 08:32

## 2022-05-13 RX ADMIN — CYANOCOBALAMIN TAB 500 MCG 1000 MCG: 500 TAB at 08:32

## 2022-05-13 RX ADMIN — ATORVASTATIN CALCIUM 40 MG: 40 TABLET, FILM COATED ORAL at 08:33

## 2022-05-13 ASSESSMENT — ACTIVITIES OF DAILY LIVING (ADL)
ADLS_ACUITY_SCORE: 43

## 2022-05-13 NOTE — PROGRESS NOTES
[facility]    Medicine Progress Note - Hospitalist Service, GOLD TEAM 10    Date of Admission:  5/3/2022    Assessment & Plan            Isabell Matthews is a 61 year old female, with h/o acute ischemic stroke of right frontopperital punctate stroke due to small-vessel occlusion (was hospitalized in 4/5/22), multiple lacunar and punctate ischemic infarcts and multifocal moderate to severe stenosis in multiple vascular territories, seizure disorder, DMII, CKD3, HTN, HLD, CAD s/p PCI, hypothyroidism, vascular dementia, presenting with AMS/aphasia, found to have hyperglycemia 2/2 DKA and elevated LA. Per family, patient has had increasing difficulty with PO intake with acute decline three days prior to admission in setting of altered mental status. Following admission, patient treated with DKA protocol with transition to subcutaneous insulin regimen per endocrinology. Infectious workup significant for potential UTI with completion of 7 day total course of therapy. Nutrition supplemented via enteral tube feeds, however, patient pulled out NG tube 2 times over a 48-hour period.  Patient continued to improve to baseline mental status with fluctuating PO intake.     Changes today   - Psychology consulted   - Diet changed to pureed  - Increased somnolence, reassuring workup thus far  - Increase sliding scale insulin to high intensity  - Likely discharge on 5/14      # DKA, resolved  # UTI 2/2 aerococcus  # H/o DMII (No DEMETRIA ab/C-peptide)  Her glucose was 686, ketones 5.4, hgba1c 8.9. Had DKA in 10/2020. Per Endo, suspect insulin dependent DM II. Possibly provoked in setting of UTI: UA showing bacteria and WB 15 with small LE, urine culture with growth of aerococcus. Patient completed 7 day total course of IV antibiotics while inpatient. Infectious workup including COVID, flu, RVP otherwise reassuring. Notably, daughter/ primary caregiver reports recent difficulty with maintaining  adequate PO intake with skipped insulin evening 5/1, daytime 5/2 due to decreased appetite and concern for precipitating hypoglycemia.  - Endocrine consulted, now signed off   - Recs for discharge regimen to home vs TCU in endo progress note dated 5/10    # Malnutrition  # Severe protein calorie malnutrition in context of acute illness  # Poor PO  Fluctuating PO intake prior to admission and during hospitalization. No concern for dysphagia or aspiration on swallow eval.  - Level of malnutrition: Severe        # AGMA with uncompensated respiratory acidosis  # Hyperkalemia  # Elevated lactate    # Acute on Chronic kidney disease  Cr 1.84 on admission. Cr baseline 1.2-1.45. 2/2 volume depletion 2/2 DKA.     # Acute metabolic toxic encephalopathy  # Aphasia  # Concern for acute stroke  Upon presentation to the ED, there was concern for aphasia and stroke code was called, CT head showed no acute intracranial hemorrhages, showed small chronic infarcts. CTA head showed no intracranial arterial large vessel occlusion, and CT neck showed left carotid bifurcation moderate stenosis, atherosclerotic plaque results in less than 50% stenosis in the right carotid bulb and proximal cervical ICA, chronic right vertebral artery and proximal V1 with severe stenosis, but overall no significant interval change compared to CTA head and neck 04/10/2022. Not given TPA due to history of intracranial hemorrhage. EEG showing evidence of diffuse slow-wave.  MRI nonrevealing for acute stroke.    - Improved following treatment of DKA, UTI as above     --Chronic diseases--   #Seizure disorder  - PTA Keppra 500 mg BID, resume PO     #CKD3  - BMP daily     #HTN  #CAD s/p PCI  #HLD  On admission /77.  - PTA coreg 6.25 mg  - Resume PTA nifedipine 60 mg daily from 30 mg daily)  - PTA atrova 40 mg daily  - Continue PRN hydralzine 10 mg q6h, fpr SBP >170     #Hypothyroidism  - PTA synthroid 88 mcg daily  - Repeat TSH w/ reflew     #Vascular  dementia  - Delirium precautions     # MDD  - pta duloxetine 20 mg daily     # Urinary retention  - pta doxazosin       Diet: Snacks/Supplements Adult: Glucerna; With Meals  Room Service  Snacks/Supplements Adult: Other; See list below; Between Meals  Combination Diet Regular Diet; Pureed Diet (level 4); Thin Liquids (level 0)Regular diet, easy to chew with thin liquids  DVT Prophylaxis: Pneumatic Compression Devices  Parker Catheter: Not present  Central Lines: None  Cardiac Monitoring: None  Code Status: Full Code      Disposition Plan   Expected Discharge: 05/14/2022     Anticipated discharge location:  Awaiting care coordination huddle  Delays:     Other (Add Comment)  Placement - Homecare          The patient's care was discussed with the Bedside Nurse and Patient.    Carmen Driscoll MD  Hospitalist Service, [address]  Securely message with the Vocera Web Console (learn more here)  Text page via [facility] Paging/Directory   Please see signed in provider for up to date coverage information      Clinically Significant Risk Factors Present on Admission                    ______________________________________________________________________    Interval History   Nursing notes reviewed. No acute events overnight. Patient significantly fatigued. Reported feeling unwell but unable to give specifics. Cr slightly up. Fluids ordered.    4 point ROS is otherwise negative.    Data reviewed today: I reviewed all medications, new labs and imaging results over the last 24 hours. I personally reviewed .    Physical Exam   Vital Signs: Temp: 97.8  F (36.6  C) Temp src: Axillary BP: 127/61 Pulse: 77   Resp: 16 SpO2: 95 % O2 Device: None (Room air)    Weight: 141 lbs 5.04 oz  Physical Exam  General: Awake, alert, conversant, NAD  Pulm: No increased wob, no wheeze  Abd: Soft, NTND  Neuro: AOx3, disoriented to place    Data

## 2022-05-13 NOTE — PLAN OF CARE
[id number] Changed to pureed diet today. Supervision with eating. Needing reminders to swallow and take smaller bites. Did help to bring empty spoon to mouth to initiate swallow. Slept off and on today. Delirium preventions in place. Incontinent of urine. Did have small loose bowel movement this evening.   Goal Outcome Evaluation:    Plan of Care Reviewed With: patient     Overall Patient Progress: improving    Outcome Evaluation: Tolerated pureed diet.

## 2022-05-13 NOTE — PLAN OF CARE
RN assumed cares at 1900 to 0700    Vitals: Temp: 97.5  F (36.4  C) Temp src: Oral BP: (!) 145/69 Pulse: 90   Resp: 16 SpO2: 94 % O2 Device: None (Room air)    Pain: pain in L shoulder, IcyHot patch applied, other forms of pain management refused, pt appeared asleep when checked on throughout night  Neuro: hesitant to speak, hesitant to swallow. Asking her to swallow lead to no result, when pt was told to swallow she was able to   Cardiac: WDL  Respiratory: stable on room   GI/: incontinent, pure wick in but does not fit pt anatomy/frame as well. 1:1 when eating. puree diet level 4, thin liquids. Consider crushing crushable meds OR give one at a time for big medications and two at a time for small medications  IV/Drains: 1 PIV infusing   Activity:  Rolls in bed okay, assistive 1 to get up, encourage walks  Skin: blanchable red in perineum and buttock area  Lab: BG per order    Plan of Care:  1:1 to eat, check for incontinence, encourage walk, treat pain as able, continue to monitor and contact MD as needed

## 2022-05-13 NOTE — CONSULTS
Health Psychology                                                                                                                          Catherine Smipson, Ph.D., L.P [phone]  Beatris Gracia, Ph.D., L.P. [phone]  Iram Mccann, Ph.D, L..P. [phone]  Shanelle Diehl, Ph.D., L.P. [phone]  Saul Torres, Ph.D., A.B.P.P., L.P. [phone]         Sherita Winters, Ph.D., L.P. [phone]       Dulce Villegas, Ph.D., A.B.P.P., LP [phone]           [facility], 3rd Floor  [address] [place] [address]    Inpatient Health Psychology Consultation    Date of Service:  5/13/22    BACKGROUND:  Per EMR: Isabell Matthews is a 61 year old female, with h/o acute ischemic stroke of right frontoparietal punctate stroke due to small-vessel occlusion (was hospitalized in 4/5/22), multiple lacunar and punctate ischemic infarcts and multifocal moderate to severe stenosis in multiple vascular territories, seizure disorder, DMII, CKD3, HTN, HLD, CAD s/p PCI, hypothyroidism, vascular dementia, presenting with AMS/aphasia, found to have hyperglycemia 2/2 DKA and elevated LA. Per family, patient has had increasing difficulty with PO intake with acute decline three days prior to admission in setting of altered mental status. Following admission, patient treated with DKA protocol with transition to subcutaneous insulin regimen per endocrinology. Infectious workup significant for potential UTI with completion of 7 day total course of therapy. Nutrition supplemented via enteral tube feeds, however, patient pulled out NG tube 2 times over a 48-hour period.  Patient continued to improve to baseline mental status with fluctuating PO intake.     Health Psychology consulted by Dr. Driscoll [place] [id number] Medicine Team to assess psychological status in context of CVA complicated by vascular dementia, admitted with DKA, UTI. Psychiatry met with patient 5/6/22  (Abhijeet).    SUBJECTIVE:  Met with Isabell with her daughter Vishal present. Introduced Health Psychology services and nature of referral. Began the visit asking Isabell questions about activities from today, her thoughts about her hospital stay, and plans.  She presented as drowsy and her eyes would close and her head nod prompting her daughter to try and wake her.  Occasionally she would wake and would give very brief answers to questions. She was mostly unable to participate in assessment today.    Vishal provided some background information about Isabell's mental state. We discussed how it is difficult to assess psychological status given history of dementia and CVA. Vishal said she perceives her mother's drifting off as partly due to fatigue and partly due to avoidance of discussing difficult topics/feeling overwhelmed.  Based on my observation we were not discussing difficult topics and Isabell appeared drowsy.     Vishal confirmed history of depression. Note from Dr. Castaneda (PCP) from 11/2/2020 notes significant depressive symptoms which prompted prescribing of Cymbalta. PHQ-9 = 19/27 on the day of that visit indicating moderately severe depressive symptoms.     Discussed challenges Piperilsa and Isabell have faced in the past years living through the pandemic. Discussed possible options for connecting Isabell with social support. Vishal shared her ideas of Isabell connecting with a support group which could be helpful.     Vishal inquired about more assessment of her mother's dementia and if our service did this or not. Discussed options for this in the outpatient setting and recommended a neuropsychology assessment. I see that this was also recommended by Neurology clinic in 2020.       OBJECTIVE:  Isabell and Piperilsa were initially preparing to leave the room for a walk when I came.  They agreed to meet. Isabell sat in her wheelchair during our visit.  She was initially somewhat alert and minimally engaged.  She fell  asleep frequently and eventually stayed asleep.  She did not report her mood, her affect was flat.  Difficult to assess thought process, insight, and judgment.        ASSESSMENT:  During recent Psychiatry assessment Isabell denied current depressive symptoms. She does present with a flat affect and can appear depressed but it is difficult to assess the reason for this. I was unable to elicit more information from Isabell speaking to her understanding of her situation, her emotional state, and what she wants for herself.        DIAGNOSIS:  Unspecified depressive disorder  Unspecified neurocognitive disorder    PLAN & RECOMMENDATIONS:    At this time I believe Isabell would benefit from a neuropsychology assessment outpatient to determine current level of cognitive functioning, appropriate expectations for care, and possible role of emotional symptoms in behavioral patterns    Should she remain admitted we will follow-up and are available at any time.     Discuss options for Isabell meeting with support group or possibly outpatient mental health at future PCP appointments with Dr. Castaneda.     Iram Mccann, PhD,   Clinical Health Psychologist  Phone: [phone]  Pager: [phone]      Time in: 12:45  Time out: 1:15

## 2022-05-13 NOTE — PROGRESS NOTES
[facility]    Medicine Progress Note - Hospitalist Service, GOLD TEAM 10    Date of Admission:  5/3/2022    Assessment & Plan            Isabell Matthews is a 61 year old female, with h/o acute ischemic stroke of right frontopperital punctate stroke due to small-vessel occlusion (was hospitalized in 4/5/22), multiple lacunar and punctate ischemic infarcts and multifocal moderate to severe stenosis in multiple vascular territories, seizure disorder, DMII, CKD3, HTN, HLD, CAD s/p PCI, hypothyroidism, vascular dementia, presenting with AMS/aphasia, found to have hyperglycemia 2/2 DKA and elevated LA. Per family, patient has had increasing difficulty with PO intake with acute decline three days prior to admission in setting of altered mental status. Following admission, patient treated with DKA protocol with transition to subcutaneous insulin regimen per endocrinology. Infectious workup significant for potential UTI with completion of 7 day total course of therapy. Nutrition supplemented via enteral tube feeds, however, patient pulled out NG tube 2 times over a 48-hour period.  Patient continued to improve to baseline mental status with fluctuating PO intake.     Changes today   - Psychology consulted   - Diet changed to pureed      # DKA, resolved  # UTI 2/2 aerococcus  # H/o DMII (No DEMETRIA ab/C-peptide)  Her glucose was 686, ketones 5.4, hgba1c 8.9. Had DKA in 10/2020. Per Endo, suspect insulin dependent DM II. Possibly provoked in setting of UTI: UA showing bacteria and WB 15 with small LE, urine culture with growth of aerococcus. Patient completed 7 day total course of IV antibiotics while inpatient. Infectious workup including COVID, flu, RVP otherwise reassuring. Notably, daughter/ primary caregiver reports recent difficulty with maintaining adequate PO intake with skipped insulin evening 5/1, daytime 5/2 due to decreased appetite and concern for precipitating  hypoglycemia.  - Endocrine consulted, now signed off   - Recs for discharge regimen to home vs TCU in endo progress note dated 5/10    # Malnutrition  # Severe protein calorie malnutrition in context of acute illness  # Poor PO  Fluctuating PO intake prior to admission and during hospitalization. No concern for dysphagia or aspiration on swallow eval.  - Level of malnutrition: Severe        # AGMA with uncompensated respiratory acidosis  # Hyperkalemia  # Elevated lactate    # Acute on Chronic kidney disease  Cr 1.84 on admission. Cr baseline 1.2-1.45. 2/2 volume depletion 2/2 DKA.     # Acute metabolic toxic encephalopathy  # Aphasia  # Concern for acute stroke  Upon presentation to the ED, there was concern for aphasia and stroke code was called, CT head showed no acute intracranial hemorrhages, showed small chronic infarcts. CTA head showed no intracranial arterial large vessel occlusion, and CT neck showed left carotid bifurcation moderate stenosis, atherosclerotic plaque results in less than 50% stenosis in the right carotid bulb and proximal cervical ICA, chronic right vertebral artery and proximal V1 with severe stenosis, but overall no significant interval change compared to CTA head and neck 04/10/2022. Not given TPA due to history of intracranial hemorrhage. EEG showing evidence of diffuse slow-wave.  MRI nonrevealing for acute stroke.    - Improved following treatment of DKA, UTI as above     --Chronic diseases--   #Seizure disorder  - PTA Keppra 500 mg BID, resume PO     #CKD3  - BMP daily     #HTN  #CAD s/p PCI  #HLD  On admission /77.  - PTA coreg 6.25 mg  - Resume PTA nifedipine 60 mg daily from 30 mg daily)  - PTA atrova 40 mg daily  - Continue PRN hydralzine 10 mg q6h, fpr SBP >170     #Hypothyroidism  - PTA synthroid 88 mcg daily  - Repeat TSH w/ reflew     #Vascular dementia  - Delirium precautions     # MDD  - pta duloxetine 20 mg daily     # Urinary retention  - pta doxazosin       Diet:  Snacks/Supplements Adult: Glucerna; With Meals  Room Service  Snacks/Supplements Adult: Other; See list below; Between Meals  Combination Diet Regular Diet; Pureed Diet (level 4); Thin Liquids (level 0)Regular diet, easy to chew with thin liquids  DVT Prophylaxis: Pneumatic Compression Devices  Parker Catheter: Not present  Central Lines: None  Cardiac Monitoring: None  Code Status: Full Code      Disposition Plan   Expected Discharge: 05/13/2022     Anticipated discharge location:  Awaiting care coordination huddle  Delays:     Other (Add Comment)          The patient's care was discussed with the Bedside Nurse and Patient.    Carmen Driscoll MD  Hospitalist Service, GOLD TEAM 10  [facility]  Securely message with the Vocera Web Console (learn more here)  Text page via [email or web address] Paging/Directory   Please see signed in provider for up to date coverage information      Clinically Significant Risk Factors Present on Admission                    ______________________________________________________________________    Interval History   Nursing notes reviewed. No acute events overnight. Patient significantly fatigued. Reported feeling unwell but unable to give specifics. Cr slightly up. Fluids ordered.    4 point ROS is otherwise negative.    Data reviewed today: I reviewed all medications, new labs and imaging results over the last 24 hours. I personally reviewed .    Physical Exam   Vital Signs: Temp: 99.3  F (37.4  C) Temp src: Oral BP: 119/55 Pulse: 93   Resp: 16 SpO2: 92 % O2 Device: None (Room air)    Weight: 141 lbs 8.57 oz  Physical Exam  General: Awake, alert, conversant, NAD  Pulm: No increased wob, no wheeze  Abd: Soft, NTND  Neuro: AOx3, disoriented to place    Data

## 2022-05-13 NOTE — PROGRESS NOTES
Minimally responsive verbally but does respond with lots of encouragement. Denies pain. Vitals stable. Stooling and voiding adequately, incontinent of bowel and bladder. Is eating but continues to require direction to remember to swallow her food and to keep eating. Walked to sunroom and back with walker and gait belt. Fairly steady on feet. Up in chair for 2-3 hours this afternoon. Currently sleeping comfortably in bed. Bed alarm on.

## 2022-05-14 VITALS
DIASTOLIC BLOOD PRESSURE: 51 MMHG | OXYGEN SATURATION: 96 % | HEART RATE: 89 BPM | TEMPERATURE: 97.2 F | RESPIRATION RATE: 16 BRPM | SYSTOLIC BLOOD PRESSURE: 113 MMHG | WEIGHT: 140.87 LBS | BODY MASS INDEX: 24.95 KG/M2

## 2022-05-14 LAB
ALBUMIN UR-MCNC: NEGATIVE MG/DL
ANION GAP SERPL CALCULATED.3IONS-SCNC: 4 MMOL/L (ref 3–14)
APPEARANCE UR: CLEAR
BILIRUB UR QL STRIP: NEGATIVE
BUN SERPL-MCNC: 28 MG/DL (ref 7–30)
CALCIUM SERPL-MCNC: 9.2 MG/DL (ref 8.5–10.1)
CHLORIDE BLD-SCNC: 106 MMOL/L (ref 94–109)
CO2 SERPL-SCNC: 28 MMOL/L (ref 20–32)
COLOR UR AUTO: ABNORMAL
CREAT SERPL-MCNC: 1.26 MG/DL (ref 0.52–1.04)
ERYTHROCYTE [DISTWIDTH] IN BLOOD BY AUTOMATED COUNT: 15.6 % (ref 10–15)
GFR SERPL CREATININE-BSD FRML MDRD: 48 ML/MIN/1.73M2
GLUCOSE BLD-MCNC: 308 MG/DL (ref 70–99)
GLUCOSE BLDC GLUCOMTR-MCNC: 299 MG/DL (ref 70–99)
GLUCOSE BLDC GLUCOMTR-MCNC: 323 MG/DL (ref 70–99)
GLUCOSE BLDC GLUCOMTR-MCNC: 336 MG/DL (ref 70–99)
GLUCOSE UR STRIP-MCNC: >=1000 MG/DL
HCT VFR BLD AUTO: 36.2 % (ref 35–47)
HGB BLD-MCNC: 11.3 G/DL (ref 11.7–15.7)
HGB UR QL STRIP: NEGATIVE
KETONES UR STRIP-MCNC: NEGATIVE MG/DL
LEUKOCYTE ESTERASE UR QL STRIP: ABNORMAL
MCH RBC QN AUTO: 29 PG (ref 26.5–33)
MCHC RBC AUTO-ENTMCNC: 31.2 G/DL (ref 31.5–36.5)
MCV RBC AUTO: 93 FL (ref 78–100)
NITRATE UR QL: NEGATIVE
PH UR STRIP: 6 [PH] (ref 5–7)
PLATELET # BLD AUTO: 238 10E3/UL (ref 150–450)
POTASSIUM BLD-SCNC: 4.6 MMOL/L (ref 3.4–5.3)
RBC # BLD AUTO: 3.9 10E6/UL (ref 3.8–5.2)
RBC URINE: 10 /HPF
SODIUM SERPL-SCNC: 138 MMOL/L (ref 133–144)
SP GR UR STRIP: 1.02 (ref 1–1.03)
SQUAMOUS EPITHELIAL: 2 /HPF
TRANSITIONAL EPI: <1 /HPF
UROBILINOGEN UR STRIP-MCNC: NORMAL MG/DL
WBC # BLD AUTO: 4.8 10E3/UL (ref 4–11)
WBC URINE: 12 /HPF

## 2022-05-14 PROCEDURE — 85027 COMPLETE CBC AUTOMATED: CPT | Performed by: STUDENT IN AN ORGANIZED HEALTH CARE EDUCATION/TRAINING PROGRAM

## 2022-05-14 PROCEDURE — 36415 COLL VENOUS BLD VENIPUNCTURE: CPT | Performed by: STUDENT IN AN ORGANIZED HEALTH CARE EDUCATION/TRAINING PROGRAM

## 2022-05-14 PROCEDURE — 87106 FUNGI IDENTIFICATION YEAST: CPT | Performed by: STUDENT IN AN ORGANIZED HEALTH CARE EDUCATION/TRAINING PROGRAM

## 2022-05-14 PROCEDURE — 250N000013 HC RX MED GY IP 250 OP 250 PS 637: Performed by: INTERNAL MEDICINE

## 2022-05-14 PROCEDURE — 250N000013 HC RX MED GY IP 250 OP 250 PS 637: Performed by: STUDENT IN AN ORGANIZED HEALTH CARE EDUCATION/TRAINING PROGRAM

## 2022-05-14 PROCEDURE — 250N000011 HC RX IP 250 OP 636: Performed by: STUDENT IN AN ORGANIZED HEALTH CARE EDUCATION/TRAINING PROGRAM

## 2022-05-14 PROCEDURE — 81001 URINALYSIS AUTO W/SCOPE: CPT | Performed by: STUDENT IN AN ORGANIZED HEALTH CARE EDUCATION/TRAINING PROGRAM

## 2022-05-14 PROCEDURE — 99239 HOSP IP/OBS DSCHRG MGMT >30: CPT | Performed by: STUDENT IN AN ORGANIZED HEALTH CARE EDUCATION/TRAINING PROGRAM

## 2022-05-14 PROCEDURE — 80048 BASIC METABOLIC PNL TOTAL CA: CPT | Performed by: STUDENT IN AN ORGANIZED HEALTH CARE EDUCATION/TRAINING PROGRAM

## 2022-05-14 RX ORDER — INSULIN LISPRO 100 [IU]/ML
1-7 INJECTION, SOLUTION INTRAVENOUS; SUBCUTANEOUS AT BEDTIME
Qty: 2.1 ML | Refills: 0 | Status: SHIPPED | OUTPATIENT
Start: 2022-05-14 | End: 2023-02-14

## 2022-05-14 RX ORDER — INSULIN LISPRO 100 [IU]/ML
1-10 INJECTION, SOLUTION INTRAVENOUS; SUBCUTANEOUS
Qty: 9 ML | Refills: 0 | Status: SHIPPED | OUTPATIENT
Start: 2022-05-14 | End: 2023-02-13

## 2022-05-14 RX ORDER — NYSTATIN 100000 U/G
CREAM TOPICAL 2 TIMES DAILY
Qty: 15 G | Refills: 0 | Status: SHIPPED | OUTPATIENT
Start: 2022-05-14 | End: 2022-06-13

## 2022-05-14 RX ADMIN — ATORVASTATIN CALCIUM 40 MG: 40 TABLET, FILM COATED ORAL at 09:36

## 2022-05-14 RX ADMIN — HEPARIN SODIUM 5000 UNITS: 5000 INJECTION, SOLUTION INTRAVENOUS; SUBCUTANEOUS at 09:37

## 2022-05-14 RX ADMIN — NYSTATIN: 100000 CREAM TOPICAL at 09:38

## 2022-05-14 RX ADMIN — PREGABALIN 75 MG: 75 CAPSULE ORAL at 09:35

## 2022-05-14 RX ADMIN — INSULIN ASPART 5 UNITS: 100 INJECTION, SOLUTION INTRAVENOUS; SUBCUTANEOUS at 09:38

## 2022-05-14 RX ADMIN — ASPIRIN 81 MG CHEWABLE TABLET 81 MG: 81 TABLET CHEWABLE at 09:36

## 2022-05-14 RX ADMIN — DULOXETINE 20 MG: 20 CAPSULE, DELAYED RELEASE ORAL at 09:36

## 2022-05-14 RX ADMIN — NIFEDIPINE 60 MG: 60 TABLET, EXTENDED RELEASE ORAL at 09:36

## 2022-05-14 RX ADMIN — LEVOTHYROXINE SODIUM 88 MCG: 88 TABLET ORAL at 09:36

## 2022-05-14 RX ADMIN — CYANOCOBALAMIN TAB 500 MCG 1000 MCG: 500 TAB at 09:37

## 2022-05-14 RX ADMIN — THIAMINE HCL TAB 100 MG 100 MG: 100 TAB at 09:39

## 2022-05-14 RX ADMIN — CARVEDILOL 6.25 MG: 6.25 TABLET, FILM COATED ORAL at 09:36

## 2022-05-14 RX ADMIN — Medication 25 MCG: at 09:35

## 2022-05-14 RX ADMIN — LEVETIRACETAM 500 MG: 500 TABLET, FILM COATED ORAL at 09:36

## 2022-05-14 ASSESSMENT — ACTIVITIES OF DAILY LIVING (ADL)
ADLS_ACUITY_SCORE: 47
ADLS_ACUITY_SCORE: 43
ADLS_ACUITY_SCORE: 47
ADLS_ACUITY_SCORE: 43

## 2022-05-14 NOTE — PROGRESS NOTES
Care Management Discharge Note    Discharge Date: 05/14/2022       Discharge Disposition: Home    Discharge Services: HC PT/OT/RN     Discharge DME:  none    Discharge Transportation: family or friend will provide    Private pay costs discussed: Not applicable    PAS Confirmation Code:  n/a  Patient/family educated on Medicare website which has current facility and service quality ratings:  n/a    Education Provided on the Discharge Plan:  yes  Persons Notified of Discharge Plans: patient, HC agency  Patient/Family in Agreement with the Plan:  yes    Handoff Referral Completed: Yes    Additional Information:  Patient discharges from [facility]. Patient resumes HC through [facility], orders faxed over and agency called to update per prior RNCC recommendations. RNCC available for any further needs.    [facility]  Ph: [phone]  Fax: [phone]    YURIDIA Lake, BA, RN, RNCC  Pager: [phone]  Phone: [phone]  Weekend/Holiday Pager: [phone]

## 2022-05-14 NOTE — PLAN OF CARE
Goal Outcome Evaluation:    Plan of Care Reviewed With: patient     Overall Patient Progress: no change    Outcome Evaluation: no acute changes overnight    Assumed cares [id number]    Pt sleeping between cares. BG checked and insulin given per sliding scale. No endorsements of pain or nausea overnight. Pt is slow to respond and has some difficulties at times, answers best to yes and no questions. Up with Ax1 using gait belt and walker. Purewick in place, incontinent of bowel and bladder. R PIV SL.     Continue to provide care per poc.

## 2022-05-14 NOTE — DISCHARGE SUMMARY
[facility]  Hospitalist Discharge Summary      Date of Admission:  5/3/2022  Date of Discharge:  5/14/2022  4:03 PM  Discharging Provider: Carmen Driscoll MD  Discharge Service: Hospitalist Service, GOLD TEAM 10    Discharge Diagnoses   # DKA, resolved  # UTI 2/2 aerococcus  # H/o DMII (No DEMETRIA ab/C-peptide)  # Malnutrition  # Severe protein calorie malnutrition in context of acute illness  # Poor PO  # AGMA with uncompensated respiratory acidosis  # Hyperkalemia  # Elevated lactate  # Acute on Chronic kidney disease  # Acute metabolic toxic encephalopathy  # Aphasia  # History of CVA without acute stroke  # Vascular dementia      Follow-ups Needed After Discharge   Follow-up Appointments     Follow Up ([facility])      Call patient's daughter to offer a post hospital diabetes follow up in   2-3 weeks with [facility] endocrinology/diabetes [facility].     Appointments on [place] and/or [facility] (with [facility] or [facility]   provider or service). Call [phone] if you haven't heard regarding   these appointments within 7 days of discharge.             Unresulted Labs Ordered in the Past 30 Days of this Admission     No orders found from 4/3/2022 to 5/4/2022.          Discharge Disposition   Discharged to home with resumption of home care  Condition at discharge: Stable      Hospital Course   Isabell Matthews is a 61 year old female, with h/o acute ischemic stroke of right frontopperital punctate stroke due to small-vessel occlusion (was hospitalized in 4/5/22), multiple lacunar and punctate ischemic infarcts and multifocal moderate to severe stenosis in multiple vascular territories, seizure disorder, DMII, CKD3, HTN, HLD, CAD s/p PCI, hypothyroidism, vascular dementia, presenting with AMS/aphasia, found to have hyperglycemia 2/2 DKA and elevated LA. Per family, patient has had increasing difficulty with PO intake with acute decline three days prior to admission in setting  of altered mental status. Following admission, patient treated with DKA protocol with transition to subcutaneous insulin regimen per endocrinology. Infectious workup significant for potential UTI with completion of 7 day total course of therapy. Nutrition supplemented via enteral tube feeds, however, patient pulled out NG tube 2 times over a 48-hour period.  Patient continued to improve to baseline mental status with fluctuating PO intake.       # DKA, resolved  # UTI 2/2 aerococcus  # H/o DMII (No DEMETRIA ab/C-peptide)  Her glucose was 686, ketones 5.4, hgba1c 8.9. Had DKA in 10/2020. Per Endo, suspect insulin dependent DM II. Possibly provoked in setting of UTI: UA showing bacteria and WB 15 with small LE, urine culture with growth of aerococcus. Patient completed 7 day total course of IV antibiotics while inpatient. Infectious workup including COVID, flu, RVP otherwise reassuring. Notably, daughter/ primary caregiver reports recent difficulty with maintaining adequate PO intake with skipped insulin evening 5/1, daytime 5/2 due to decreased appetite and concern for precipitating hypoglycemia. Endocrinology consulted with increased from recommended insulin regimen used while inpatient following uptrend in blood sugar.  - Follow up with endocrinology as outpatient  - Continue insulin regimen as described in medication instructions as below    # Malnutrition  # Severe protein calorie malnutrition in context of acute illness  # Poor PO  Fluctuating PO intake prior to admission and during hospitalization. No concern for dysphagia or aspiration on swallow eval.  - Level of malnutrition: Severe        # AGMA with uncompensated respiratory acidosis  # Hyperkalemia  # Elevated lactate    # Acute on Chronic kidney disease  Cr 1.84 on admission. Cr baseline 1.2-1.45. 2/2 volume depletion 2/2 DKA.    # Acute metabolic toxic encephalopathy  # Aphasia  # History of CVA without acute stroke  # Vascular dementia  Upon presentation to  the ED, there was concern for aphasia and stroke code was called, CT head showed no acute intracranial hemorrhages, showed small chronic infarcts. CTA head showed no intracranial arterial large vessel occlusion, and CT neck showed left carotid bifurcation moderate stenosis, atherosclerotic plaque results in less than 50% stenosis in the right carotid bulb and proximal cervical ICA, chronic right vertebral artery and proximal V1 with severe stenosis, but overall no significant interval change compared to CTA head and neck 04/10/2022. Not given TPA due to history of intracranial hemorrhage. EEG showing evidence of diffuse slow-wave.  MRI nonrevealing for acute stroke.  Improved following treatment of DKA, UTI as above.  - Seen by psychology while inpatient to assess potential contribution of depression to possible cognitive decline  - Neuropsychology referral placed at discharge  - Patient should follow up with PCP following discharge  - Staff message received from PCP following discharge regarding recommendations for assessment for appropriateness of memory care placement given recurrent hospitalizations. This should be considered should patient be readmitted. This was not discussed with patient or daughter who acts as primary care giver.    --Chronic diseases--   #Seizure disorder  - PTA Keppra 500 mg BID     #CKD3  - BMP daily     #HTN  #CAD s/p PCI  #HLD  On admission /77.  - PTA coreg 6.25 mg  - Resume PTA nifedipine 60 mg daily from 30 mg daily)  - PTA atrova 40 mg daily  - Continue PRN hydralzine 10 mg q6h, fpr SBP >170     #Hypothyroidism  - PTA synthroid 88 mcg daily     # MDD  - pta duloxetine 20 mg daily     # Urinary retention  - pta doxazosin    Consultations This Hospital Stay   PHARMACY TO DOSE [facility]  NURSING TO CONSULT FOR VASCULAR ACCESS CARE IP CONSULT  PHARMACY TO DOSE [facility]  ENDOCRINE DIABETES ADULT IP CONSULT  PHARMACY IP CONSULT  NURSING TO CONSULT FOR VASCULAR ACCESS CARE IP CONSULT  CARE  MANAGEMENT / SOCIAL WORK IP CONSULT  NUTRITION SERVICES ADULT IP CONSULT  PHARMACY IP CONSULT  PSYCHIATRY IP CONSULT  PHYSICAL THERAPY ADULT IP CONSULT  OCCUPATIONAL THERAPY ADULT IP CONSULT  SPEECH LANGUAGE PATH ADULT IP CONSULT  NURSING TO CONSULT FOR VASCULAR ACCESS CARE IP CONSULT  PSYCHOLOGY ADULT IP CONSULT  PSYCHOLOGY ADULT IP CONSULT  NURSING TO CONSULT FOR VASCULAR ACCESS CARE IP CONSULT    Code Status   Full Code    Time Spent on this Encounter   I, Carmen Driscoll MD, personally saw the patient today and spent greater than 30 minutes discharging this patient.       Carmen Driscoll MD  [facility] [address] [address]  Phone: [phone]  ______________________________________________________________________    Physical Exam   Vital Signs: Temp: 97  F (36.1  C) Temp src: Oral BP: 132/64 Pulse: 87   Resp: 16 SpO2: 95 % O2 Device: None (Room air)    Weight: 141 lbs 5.04 oz  General: Awake, alert, somewhat somnolent but rousable to voice and conversant, NAD  Pulm: No increased wob, lungs CTAB from the anterior without WCR  Abd: Soft, NTND  Ext: Warm and well-perfused, no edema  Neuro: AOx3, disoriented to place       Primary Care Physician   Bony Castaneda    Discharge Orders      Follow Up ([facility])    Call patient's daughter to offer a post hospital diabetes follow up in 2-3 weeks with [facility] endocrinology/diabetes [facility].     Appointments on [place] and/or [facility] (with [facility] or [facility] provider or service). Call [phone] if you haven't heard regarding these appointments within 7 days of discharge.     Resume Home Care Services    [facility]     Please resume RN/PT/OT services.    ST eval and treat       Significant Results and Procedures   Most Recent 3 CBC's:Recent Labs   Lab Test 05/14/22  0828 05/13/22  0619 05/12/22  2114   WBC 4.8 4.2 5.1   HGB 11.3* 10.1* 10.0*   MCV 93 93 92    253 271     Most Recent 3  BMP's:Recent Labs   Lab Test 05/14/22  1217 05/14/22  0828 05/14/22  0736 05/13/22  0821 05/13/22  0619 05/12/22  2231 05/12/22  2114   NA  --  138  --   --  140  --  140   POTASSIUM  --  4.6  --   --  4.5  --  4.6   CHLORIDE  --  106  --   --  108  --  107   CO2  --  28  --   --  28  --  26   BUN  --  28  --   --  19  --  18   CR  --  1.26*  --   --  1.30*  --  1.40*   ANIONGAP  --  4  --   --  4  --  7   VERONICA  --  9.2  --   --  9.2  --  8.6   * 308* 299*   < > 367*   < > 270*    < > = values in this interval not displayed.     Most Recent 2 LFT's:Recent Labs   Lab Test 05/10/22  0450 05/09/22  1127   AST 33 38   ALT 24 24   ALKPHOS 179* 170*   BILITOTAL 0.2 0.1*     Most Recent 6 Bacteria Isolates From Any Culture (See EPIC Reports for Culture Details):Recent Labs   Lab Test 06/23/21  2145 09/27/20  2236 09/27/20  1906 09/27/20  1648 09/27/20  1617   CULT >100,000 colonies/mL  mixed urogenital isaias  Susceptibility testing not routinely done    Multiple morphotypes present with no predominant organism.  Growth consistent with   probable contamination during collection.  Suggest repeat specimen if clinically   indicated.   No growth No growth No growth No growth     Most Recent Hemoglobin A1c:Recent Labs   Lab Test 05/03/22  0227   A1C 8.9*     Most Recent 6 glucoses:Recent Labs   Lab Test 05/14/22  1217 05/14/22  0828 05/14/22  0736 05/14/22  0156 05/13/22  2129 05/13/22  1706   * 308* 299* 323* 129* 249*   ,   Results for orders placed or performed during the hospital encounter of 05/03/22   CT Head w/o Contrast    Narrative    EXAM: CT HEAD W/O CONTRAST  LOCATION: [facility]  DATE/TIME: 5/3/2022 2:50 AM    INDICATION: Altered level of consciousness, confusion, possible stroke  COMPARISON: CT head 04/10/2022. MRI brain 04/05/2022.  TECHNIQUE: Routine CT Head without IV contrast. Multiplanar reformats. Dose reduction techniques were  used.    FINDINGS:  INTRACRANIAL CONTENTS: No intracranial hemorrhage, extraaxial collection, or mass effect.  No CT evidence of acute infarct. Mild to moderate presumed chronic small vessel ischemic changes. Mild to moderate generalized volume loss. No hydrocephalus. Right   frontal lobe predominantly white matter chronic infarct. Right basal ganglia small chronic infarct. Bilateral thalami demonstrate multiple small chronic infarct redemonstrated from CT head 04/10/2022. Left basal ganglia chronic infarct better visualized   on MRI brain 04/05/2022.    VISUALIZED ORBITS/SINUSES/MASTOIDS: No intraorbital abnormality. No paranasal sinus mucosal disease. No middle ear or mastoid effusion.    BONES/SOFT TISSUES: No acute abnormality. Vascular calcifications.      Impression    IMPRESSION:  1.  No acute intracranial hemorrhage.  2.  No CT evidence acute infarct. Aspect score 10.  3.  Age-related changes described above.  4.  Small chronic infarcts described above.    TAM Lim was notified by Dr Reynaldo Hernandez at  3:10 AM 05/03/2022.    CTA Head Neck with Contrast    Narrative    EXAM: CTA  HEAD NECK WITH CONTRAST  LOCATION: [facility]  DATE/TIME: 5/3/2022 2:50 AM    INDICATION: Altered mental status, confusion, concern for stroke  COMPARISON: CTA head and neck 04/10/2022  CONTRAST: iopamidol (ISOVUE 370) solution 75 mL  TECHNIQUE: Head and neck CT angiogram with IV contrast. Axial helical CT images of the head and neck vessels obtained during the arterial phase of intravenous contrast administration. Axial 2D reconstructed images and multiplanar 3D MIP reconstructed   images of the head and neck vessels were performed by the technologist. Dose reduction techniques were used. All stenosis measurements made according to NASCET criteria unless otherwise specified.    FINDINGS:   HEAD CTA:  Right precavernous ICA severe stenosis. Right cavernous ICA moderate stenosis.  Right carotid siphon mild to moderate stenosis. Right supraclinoid ICA severe stenosis. Findings are redemonstrated from prior examination.    Left precavernous ICA moderate stenosis. Left cavernous ICA mild stenosis. Left carotid siphon mild to moderate stenosis. Left supraclinoid ICA moderate stenosis. Findings redemonstrated from prior examination.    Right proximal M2 segments demonstrate multiple severe stenoses and occlusions similar to prior examination.    Trifurcated A2/A3 segments. Posterior A2/A3 segment demonstrate multiple stenoses ranging from mild to severe similar to prior examination.    Left distal M1 segment moderate stenosis redemonstrated.    Left P2 segment demonstrates multiple severe stenoses redemonstrated from prior examination.     No additional significant stenosis/occlusion.      No brain aneurysm. No AVM/AVF.    Codominant left posterior communicating artery and left P1 segment.     Dominant right and smaller left vertebral artery contribute to a normal basilar artery.     DURAL VENOUS SINUSES: Not well evaluated on a technical basis.      NECK CTA:  RIGHT CAROTID: Atherosclerotic plaque results in less than 50% stenosis in the right carotid bulb and proximal cervical ICA. No dissection.    LEFT CAROTID: Left carotid bifurcation moderate stenosis. Otherwise, no significant stenosis/occlusion. No dissection.    VERTEBRAL ARTERIES:  Right vertebral artery origin and proximal V1 segment severe stenosis redemonstrated.    Remaining bilateral extradural vertebral arteries demonstrate no significant stenosis/occlusion. No dissection.      Balanced vertebral arteries.    AORTIC ARCH: Classic aortic arch anatomy. Left subclavian artery origin mild stenosis redemonstrated.    ARTERIAL PLAQUE: Calcified/noncalcified plaque aorta, left subclavian artery, right brachycephalic artery, right subclavian artery, bilateral carotid bifurcations/bulbs, bilateral precavernous ICAs, bilateral carotid  siphons, right proximal vertebral   artery.    NONVASCULAR STRUCTURES:   Degenerative changes noted within the spine. Dental disease with multiple cavities and periapical lucencies.          Impression     IMPRESSION:   HEAD CTA:   No intracranial arterial large vessel occlusion.    Multiple severe stenoses and occlusions described above not significantly changed compared to CTA head and neck 04/10/2022.    NECK CTA:  No significant interval change compared to CTA head and neck 04/10/2022.    Atherosclerotic plaque results in less than 50% stenosis in the right carotid bulb and proximal cervical ICA.      Left carotid bifurcation moderate stenosis.     Right vertebral artery origin and proximal V1 segment severe stenosis redemonstrated.         TAM Lim was notified by Dr Reynaldo Hernandez at  3:30 AM 05/03/2022.     XR Chest Port 1 View    Narrative    EXAM: XR CHEST PORT 1 VIEW  LOCATION: [facility]  DATE/TIME: 5/3/2022 3:37 AM    INDICATION: URI symptoms  COMPARISON: 4/10/2022.    FINDINGS: The heart size is normal. The thoracic aorta is calcified. The lungs are clear. No pneumothorax.      Impression    IMPRESSION: No acute abnormality.   US Carotid Bilateral    Narrative    Exam: Bilateral carotid duplex Doppler ultrasound dated 5/3/2022 8:56  AM    Clinical history: carotid stenosis on CT    Comparison Study: Same-day head and neck CTA report    Ordering provider: Dr. Kulkarni    Technique: Grayscale (B-mode) and duplex and spectral Doppler  ultrasound of the extracranial internal carotid, external carotid,  vertebral artery origins, right brachiocephalic/subclavian and left  subclavian arteries. Velocity measurements obtained with angle  correction at or less than 60 degrees.    Findings:    Right side:     Plaque Morphology: Predominantly echogenic, Irregular       Proximal CCA: 128/2 cm/sec     Mid CCA: 133/12 cm/sec     Distal CCA: 81/12 cm/sec     External  CA: 261/9 cm/sec       Proximal ICA: 57/10 cm/sec     Mid ICA: 59/17 cm/sec     Distal ICA: 59/14 cm/sec       Vertebral artery: 37/6 cm/sec    ICA/CCA ratio: 0.73    Left side:     Plaque Morphology: Predominantly echogenic, Smooth       Proximal CCA: 135/0 cm/sec     Mid CCA: 89.4/7 cm/sec     Distal CCA: 87/12 cm/sec  External CA: 260/7 cm/sec       Proximal ICA: 92/12 cm/sec     Mid ICA: 93/11 cm/sec     Distal ICA: 86/14 cm/sec       Vertebral artery: 55/7 cm/sec     ICA/CCA ratio: 1.07      Impression    Impression:    1. Right side:        Degree of stenosis of the internal carotid artery: Less than 50 %  based on grayscale plaque estimate. Stenosis at the external carotid  artery is noted.    2. Left side:         Degree of stenosis of the internal carotid artery: Less than 50%  based on grayscale plaque estimate. Stenosis at the external carotid  artery is noted.      Inters\A Chronology of [place] [facility]\"" Accreditation Commission Updated Recommendations for  Carotid Stenosis Interpretation Criteria - October 2021.  https://intersocietal.org/wp-content/uploads/2021/10/IAC-Vascular-Test  xo-Nqurirqkdaxzy_Rawjvex-Jvvwnmcfscwgyqd-for-Carotid-Stenosis-Interpre  ation-Criteria.pdf         Normal            ICA PSV: < 180 cm/s            Plaque Estimate: None            ICA/CCA PSV Ratio: < 2.0            ICA EDV: < 40 cm/s         < 50%            ICA PSV: < 180 cm/s            Plaque Estimate: < 50%            ICA/CCA PSV Ratio: < 2.0            ICA EDV: < 40 cm/s         50 - 69%            ICA PSV: < 180 - 230 cm/s            Plaque Estimate: > 50%            ICA/CCA PSV Ratio: 2.0 - 4.0            ICA EDV: 40 - 100 cm/s         > 70% but less than near occlusion            ICA PSV: > 230 cm/s            Plaque Estimate: > 50%            ICA/CCA PSV Ratio: > 4.0            ICA EDV: > 100 cm/s         Total occlusion            ICA PSV: Undetectable            Plaque Estimate: Visible, no detectable lumen            ICA/CCA PSV Ratio:  N/A            ICA EDV: N/A    I have personally reviewed the examination and initial interpretation  and I agree with the findings.    DONNELL ALTAMIRANO MD         SYSTEM ID:  [id number]   MR Brain w/o & w Contrast    Narrative    MR BRAIN W/O & W CONTRAST 5/3/2022 6:12 PM    Provided History: Mental status change, unknown cause    Comparison:  CT 5/3/2022. MR 4/5/2022.     Technique: Sagittal T1-weighted, coronal T2-weighted, axial T2 FLAIR,  axial susceptibility weighted, and axial diffusion-weighted with ADC  map images of the brain were obtained with and without intravenous  contrast. Postcontrast axial and coronal sequences are obtained.    Findings: No intracranial mass lesion, mass effect, midline shift, or  abnormal fluid collection. Unchanged distribution of susceptibility  artifact scattered throughout the brain. Scattered enlarged  perivascular spaces versus lacunar infarctions in the basal ganglia  bilaterally. Expected evolution of chronic appearing left basal  ganglia infarction with hemosiderin position. Previously described  right frontal lobe predominantly white matter chronic infarction is  stable. No substantial change in periventricular and deep white matter  T2 hyperintensities, nonspecific but likely related to sequela of  chronic small vessel ischemic disease. Mild cerebral volume loss. No  abnormal intracranial enhancement. The ventricles and sulci are normal  for age. No abnormality of reduced diffusion.  Normal intravascular  flow voids. The orbits are grossly unremarkable.      Impression    Impression:   1. No evidence of new infarction. No abnormal intracranial  enhancement.  2. Stable-appearing chronic infarctions as described above.  3. Unchanged scattered chronic microhemorrhages as described above  likely secondary to hypertension or amyloid deposition.         I have personally reviewed the examination and initial interpretation  and I agree with the findings.    PHUONG FOY  MD MARY         SYSTEM ID:  [id number]   US Renal Complete    Narrative    EXAMINATION: US RENAL COMPLETE, 5/5/2022 9:29 AM     COMPARISON: CT 4/10/2022 and 9/27/2020.    HISTORY: Evaluate for possible source of UTI    TECHNIQUE: The kidneys and bladder were scanned in the standard  fashion with specialized ultrasound transducer(s) using both gray  scale and limited color/spectral Doppler techniques.    FINDINGS:    Right kidney: Measures 10.1 cm in length. Parenchyma is of normal  thickness and echogenicity. No focal mass. No hydronephrosis. 1.2 cm  simple cyst in the interpolar region.    Left kidney: Measures 8.3 cm in length. Parenchyma is of normal  thickness and echogenicity. No focal mass. No hydronephrosis.     Bladder: Unremarkable.      Impression    IMPRESSION:  1.  The intermediate density lesion in the left interpolar region seen  on prior CT scans could not be visualized by ultrasound. It could be a  solid neoplasm or a hemorrhagic cyst. Recommend MRI for further  evaluation.   2.  No hydronephrosis.    JAY JAY CARMEN MD         SYSTEM ID:  [id number]   XR Abdomen Port 1 View    Narrative    Exam: XR ABDOMEN PORT 1 VIEWS, 5/5/2022 12:29 PM    Indication: NG placement    Comparison: CT chest abdomen and pelvis 4/10/2022    Findings:   Portable supine AP radiograph of the abdomen. The tip and sidehole of  the enteric tube project over the stomach. Nonobstructive bowel gas  pattern. No visualized pneumatosis or portal venous gas. Partially  visualized bilateral hip arthroplasties. Linear atelectasis/fibrosis  of the left lower lobe. Vascular calcifications.      Impression    Impression:   1. The tip and sidehole of the enteric tube project over the stomach.  2. Nonobstructive bowel gas pattern.    I have personally reviewed the examination and initial interpretation  and I agree with the findings.    MARIXA STUART MD         SYSTEM ID:  [id number]   XR Abdomen Port 1 View    Narrative    Exam: XR  ABDOMEN PORT 1 VIEWS, 5/6/2022 8:34 AM    Indication: NG placement    Comparison: X-ray Abdomen 5/5/2022    Findings:     Frontal view x-ray of the abdomen. Enteric tube tip and sidehole  projects over the stomach. Nonobstructive bowel gas pattern. No  pneumatosis or portal venous gas. Lung bases appear clear. No acute  osseous lesion. Mild degenerative changes of the visualized spine.  Atherosclerotic aortic calcification.      Impression    Impression: Enteric tube tip and sidehole projects over the stomach.    RON FAITH MD         SYSTEM ID:  [id number]   MR Renal wo & w Contrast    Narrative    Exam: MR RENAL W/O & W CONTRAST 5/10/2022 1:26 PM    Indication: Renal ultrasound identified a mass concern for malignancy    Comparison: Ultrasound 5/5/2022, 4/10/2022    Technique: Images were acquired with and without intravenous contrast  through the abdomen. The following MR images were acquired: TrueFISP,  multiplanar T2 weighted, axial T1 in/out of phase, axial fat-saturated  T1, diffusion-weighted. Multiplanar T1-weighted images with fat  saturation were obtained before contrast administration and at  multiple time points following the administration of intravenous  contrast. Contrast dose: 6.5 mL Gadavist    FINDINGS:    Liver: Focus of enhancement in segment 6 on series 12 image 28 without  corresponding abnormality on other sequences and is likely vascular  phenomenon. No suspicious masses. No hepatic steatosis.    Gallbladder/biliary tree: No gallstones. No biliary dilatation. Low  medial insertion of the cystic duct.    Spleen: Unremarkable.    Pancreas: Pancreas divisum. Mildly atrophic and otherwise  unremarkable. No ductal dilation.    Adrenal glands: Unremarkable.    Kidneys: The left kidney demonstrates a 3.6 x 3.3 x 3.0 cm multicystic  structure with multiple thin septations. No nodular enhancing  component or measurable septal enhancement. The right kidney  demonstrates a 1.3 cm simple cyst. No  solid renal mass. No  hydronephrosis.    Bowel: Unremarkable. No obstruction.    Lymph nodes: No bulky lymphadenopathy.    Blood vessels: Unremarkable.    Lung bases: Unremarkable.    Bones and soft tissues: Unremarkable. No suspicious lesions.    Mesentery and abdominal wall: Unremarkable.    Ascites: Trace right lower quadrant ascites.        Impression    IMPRESSION:  Multicystic lesion in the left kidney without measurable septal  enhancement or definite nodular enhancing component, similar in size  to prior CT scan. Bosniak 2F. Recommend MRI follow-up in 6 months.        I have personally reviewed the examination and initial interpretation  and I agree with the findings.    GREG STALEY MD         SYSTEM ID:  [id number]   XR Chest Port 1 View    Narrative    EXAM:  XR CHEST PORT 1 VIEW    INDICATION: sepsis eval, concern for aspiration    COMPARISON:  5/3/2022    FINDINGS:  Single AP view of the chest. Cardiomediastinal silhouette within  normal limits. No acute airspace opacity. Minor basal atelectasis. No  pneumothorax. No pleural effusion. Unremarkable upper abdomen. No  acute bony lesions.      Impression    IMPRESSION:  Minor basilar atelectasis. No acute airspace consolidation.    I have personally reviewed the examination and initial interpretation  and I agree with the findings.    LEIDY ESPINOZA MD         SYSTEM ID:  [id number]       Discharge Medications   Current Discharge Medication List      CONTINUE these medications which have NOT CHANGED    Details   aspirin (ASA) 81 MG chewable tablet Take 1 tablet (81 mg) by mouth daily  Qty: 30 tablet, Refills: 0    Associated Diagnoses: Acute CVA (cerebrovascular accident) (H)      atorvastatin (LIPITOR) 40 MG tablet Take 1 tablet (40 mg) by mouth daily  Qty: 90 tablet, Refills: 3    Associated Diagnoses: Hyperlipidemia LDL goal <70      carvedilol (COREG) 6.25 MG tablet 1 tablet (6.25 mg) by Oral or Feeding Tube route 2 times daily (with meals)  Qty:  180 tablet, Refills: 3    Associated Diagnoses: Left-sided nontraumatic intracerebral hemorrhage, unspecified cerebral location (H); Benign essential hypertension      cyanocobalamin (VITAMIN B-12) 1000 MCG tablet Take 1 tablet (1,000 mcg) by mouth daily  Qty: 100 tablet, Refills: 3    Associated Diagnoses: Vitamin B12 deficiency (non anemic)      diclofenac (VOLTAREN) 1 % topical gel Apply 2 g topically 4 times daily as needed for moderate pain  Qty: 150 g, Refills: 1    Associated Diagnoses: Pain in joint, ankle and foot, right      doxazosin (CARDURA) 1 MG tablet Take 1 tablet (1 mg) by mouth At Bedtime  Qty: 90 tablet, Refills: 3    Associated Diagnoses: Left-sided nontraumatic intracerebral hemorrhage, unspecified cerebral location (H); Benign essential hypertension      DULoxetine (CYMBALTA) 20 MG capsule Take 1 capsule (20 mg) by mouth daily  Qty: 90 capsule, Refills: 1    Associated Diagnoses: Fibromyalgia      insulin glargine (LANTUS PEN) 100 UNIT/ML pen Please inject 18 units at bedtime change of dose  Qty: 30 mL, Refills: 4    Comments: If Lantus is not covered by insurance, may substitute Basaglar at same dose and frequency check with patient if supplies needed prior to fill  Associated Diagnoses: Type 1 diabetes mellitus with other circulatory complication (H); History of insulin dependent diabetes mellitus      insulin lispro (HUMALOG) 100 UNIT/ML Cartridge Take prior to meal with sliding scale per glucose level adjust as noted in notes usual dose around 20 units daily,1 unit to max 13 units Prior to meals taking 2 meals daily,1 unit with food premeal plus additional 140-169 [phone] -199 (4) 200-229(5) 230-259(6) 260-289( 7) 290-319(8) 320-349(9) 350-379 (10) 380-409(11) greater 410(12)y  Qty: 9 mL, Refills: 4    Associated Diagnoses: Type 1 diabetes mellitus with other circulatory complication (H)      levETIRAcetam (KEPPRA) 500 MG tablet Take 1 tablet (500 mg) by mouth 2 times daily  Qty: 186 tablet,  Refills: 3    Associated Diagnoses: Seizures (H)      loratadine (CLARITIN) 10 mg tablet [LORATADINE (CLARITIN) 10 MG TABLET] Take 10 mg by mouth daily.      melatonin 10 mg Tab Take 10 mg by mouth nightly as needed      NIFEdipine ER (ADALAT CC) 60 MG 24 hr tablet Take 1 tablet (60 mg) by mouth daily  Qty: 90 tablet, Refills: 1    Associated Diagnoses: Benign essential hypertension      pregabalin (LYRICA) 75 MG capsule TAKE 1 CAPSULE(75 MG) BY MOUTH TWICE DAILY  Qty: 60 capsule, Refills: 1    Associated Diagnoses: Neuropathy; Fibromyalgia      SYNTHROID 88 MCG tablet Take 1 tablet (88 mcg) by mouth daily  Qty: 90 tablet, Refills: 3    Associated Diagnoses: Acquired hypothyroidism      Vitamin D3 (CHOLECALCIFEROL) 25 mcg (1000 units) tablet Take 1 tablet (25 mcg) by mouth daily  Qty: 100 tablet, Refills: 3    Associated Diagnoses: Benign essential hypertension      blood glucose (NO BRAND SPECIFIED) test strip Use to test blood sugar 4-5 times daily or as directed.  Qty: 400 strip, Refills: 3    Associated Diagnoses: Type 1 diabetes mellitus with other circulatory complication (H)      insulin pen needle (31G X 8 MM) 31G X 8 MM miscellaneous Use 4 pen needles daily or as directed.  Qty: 300 each, Refills: 4    Associated Diagnoses: History of insulin dependent diabetes mellitus           Allergies   Allergies   Allergen Reactions     Bee Pollen Headache     Pollen Extract Headache

## 2022-05-14 NOTE — PROGRESS NOTES
Denies pain, vss, eating well, swallowing food and pills with encouragement. Incontinent of bowel and bladder. Having frequent loose stools. Discharge orders, medications, follow-up appointments reviewed with daughter Maria Guadalupe and signed. PIV removed. Dressed in street clothes. Discharged home in wheelchair to lobby at 1600 with daughter providing ride home.

## 2022-05-16 ENCOUNTER — PATIENT OUTREACH (OUTPATIENT)
Dept: CARE COORDINATION | Facility: CLINIC | Age: 62
End: 2022-05-16
Payer: MEDICARE

## 2022-05-16 DIAGNOSIS — Z71.89 OTHER SPECIFIED COUNSELING: ICD-10-CM

## 2022-05-16 LAB
BACTERIA UR CULT: ABNORMAL
BACTERIA UR CULT: ABNORMAL

## 2022-05-16 NOTE — PROGRESS NOTES
Speech Language Therapy Discharge Summary    Reason for therapy discharge:    Discharged to home with home therapy.    Progress towards therapy goal(s). See goals on Care Plan in [facility] electronic health record for goal details.  Goals not met.  Barriers to achieving goals:   discharge from facility.    Therapy recommendation(s):    Continued therapy is recommended.  Rationale/Recommendations:  dysphagia tx. At the time of discharge the patient was on a pureed diet (IDDSI 4) with thin liquid (0) with 1:1 supervision..

## 2022-05-16 NOTE — PLAN OF CARE
Physical Therapy Discharge Summary    Reason for therapy discharge:    Discharged to home with home therapy.    Progress towards therapy goal(s). See goals on Care Plan in [facility] electronic health record for goal details.  Goals partially met.  Barriers to achieving goals:   limited tolerance for therapy and discharge from facility.    Therapy recommendation(s):    Continued therapy is recommended.  Rationale/Recommendations:  to continue to progress functional mobility status.

## 2022-05-16 NOTE — PROGRESS NOTES
Clinic Care Coordination Contact  [facility]: Post-Discharge Note  SITUATION                                                      Admission:    Admission Date: 05/03/22   Reason for Admission: urinary tract infection and DKA and treated  with antibiotics and insulin  Discharge:   Discharge Date: 05/14/22  Discharge Diagnosis: urinary tract infection and DKA and treated  with antibiotics and insulin    BACKGROUND                                                      Per hospital discharge summary and inpatient provider notes:    Isabell Matthews is a 61 year old female, with h/o acute ischemic stroke of right frontopperital punctate stroke due to small-vessel occlusion (was hospitalized in 4/5/22), seizure disorder, DMII, CKD3, HTN, HLD, CAD s/p PCI, hypothyroidism, vascular dementia, presenting with hyperglycemia 2/2 DKA.     Of note in 4/2022, pt presented with 1 day of difficulty waking and 45 mins of L eye vision loss.  IV thrombolysis was not given  due to unclear or unfavorable risk-benefit profile for extended window thrombolysis beyond the conventional 4.5 hour time window. Etiology was thought to be small vessel disease.  Was recommended asa 81 mg daily with hgba1c goal <7.    ASSESSMENT      Enrollment  Primary Care Care Coordination Status: Not a Candidate    Discharge Assessment  How are you doing now that you are home?: She is doing good. She has been eating very well. Her blood pressure has been good.  How are your symptoms? (Red Flag symptoms escalate to triage hotline per guidelines): Improved  Do you feel your condition is stable enough to be safe at home until your provider visit?: Yes  Does the patient have their discharge instructions? : Yes  Does the patient have questions regarding their discharge instructions? : No  Were you started on any new medications or were there changes to any of your previous medications? : Yes  Does the patient have all of their medications?: Yes  Do you have  questions regarding any of your medications? : No  Discharge follow-up appointment scheduled within 14 calendar days? : Yes  Discharge Follow Up Appointment Date: 05/24/22  Discharge Follow Up Appointment Scheduled with?: Specialty Care Provider                PLAN                                                      Outpatient Plan: Please set up an appointment with:  Endocinrology in 2-3 weeks to review blood sugars after  discharge and discuss potential changes to insulin regimen  Dr. Castaneda in the next 1-3 weeks for routine posthospitalization  follow-up  Neuropsychology for recommended testing    Future Appointments   Date Time Provider Department Center   5/24/2022  1:30 PM Carolina Esteves PA-C [facility]   6/1/2022  1:00 PM [facility]    8/15/2022  3:00 PM Elisa Soto MD [facility]          For any urgent concerns, please contact our 24 hour nurse triage line: [phone] ([phone])         URBANO Rodarte  [phone]  [facility] Care Resource [facility]

## 2022-05-19 ENCOUNTER — TELEPHONE (OUTPATIENT)
Dept: FAMILY MEDICINE | Facility: CLINIC | Age: 62
End: 2022-05-19
Payer: MEDICARE

## 2022-05-19 NOTE — TELEPHONE ENCOUNTER
[facility] Call Center    Phone Message    May a detailed message be left on voicemail: yes     Reason for Call: Order(s): Home Care Orders: Skilled Nursing:  verbal orders to discharge from skilled nursing early due to goals met    Action Taken: Message routed to:  [facility] ([facility]): sampson

## 2022-05-19 NOTE — TELEPHONE ENCOUNTER
[facility] Call Center    Phone Message    May a detailed message be left on voicemail: yes     Reason for Call: Order(s): Order(s): Home Care Orders: Physical Therapy (PT): PT 1x a week for 1 week, 2x a week for 3 weeks, 1x a week for 1 week,   Occupational Therapy (OT): OT 1x a week for 1 week, 2x a week for 2 weeks, 1x a week for 1 week  Skilled Nursing: SN 1x a week for 4 weeks    Please review ands follow up with verbal orders thank you.    Action Taken: Message routed to:  [facility] pcc    Travel Screening: Not Applicable

## 2022-05-19 NOTE — TELEPHONE ENCOUNTER
"Unable to give orders at this time.  Patient \"no show\" her appointment with .  Last seen by Dr. Castaneda about 7 months ago.  No future appointment with Dr. Castaneda.    A VM was left for patient to schedule an appointment with Dr. Castaneda in-person.    Edwar Andrews CMA ([facility]) at 3:16 PM on 5/19/2022     "

## 2022-05-19 NOTE — TELEPHONE ENCOUNTER
"Called Pete and left a secure VM.  Pete does not have the verbal orders for home care orders because patient \"no show\" her 05/09/22 appointment with Dr. Castaneda.  Dr. Castaneda last saw patient about 7-8 months ago.  Patient need to schedule an in-person visit with Dr. Castaneda in order to continue and give orders for home care.  Message sent to clinic coordinator to schedule an in-person hospital follow up with Dr. Castaneda.        Edwar Andrews CMA ([facility]) at 12:35 PM on 5/19/2022     "

## 2022-05-20 ENCOUNTER — TELEPHONE (OUTPATIENT)
Dept: ENDOCRINOLOGY | Facility: CLINIC | Age: 62
End: 2022-05-20
Payer: MEDICARE

## 2022-05-20 NOTE — TELEPHONE ENCOUNTER
May 20, 2022  She needs to have an appt to restart home care and address the urine culture. Please call to schedule if you do not reach them need to send letter.   I will ask endocrine to address is they see her as we have not been able to reach them.  Best wishes,  Bony Castaneda MD

## 2022-05-20 NOTE — TELEPHONE ENCOUNTER
Called patient and left voicemail. Patient has an appointment on 5/24/22. Need to collect the last 14 days worth of blood sugar readings to prepare for patient's visit. Alisia Colon on 5/20/2022 at 2:32 PM

## 2022-05-20 NOTE — TELEPHONE ENCOUNTER
Nurse Dorina was able to reach patient.  Appointment schedule with Dr. Castaneda next month.     Per protocol, can give home care orders if patient is schedule with pcp within 30 days.         Called Pete and left a secure VM.  Got patient schedule to see Dr. Castaneda on 06/13.  Patient must show at her appt to continue to follow patient for home care orders.  Per protocol, can give home care orders if patient is schedule with pcp within 30 days.  Gave verbal orders per Dr. Castaneda for Order(s): Order(s): Home Care Orders: Physical Therapy (PT): PT 1x a week for 1 week, 2x a week for 3 weeks, 1x a week for 1 week,   Occupational Therapy (OT): OT 1x a week for 1 week, 2x a week for 2 weeks, 1x a week for 1 week  Skilled Nursing: discontinued today since goal has been met.       Edwar Andrews CMA ([facility]) at 3:32 PM on 5/20/2022

## 2022-05-20 NOTE — TELEPHONE ENCOUNTER
Nurse Dorina was able to reach patient.  Appointment schedule with Dr. Castaneda next month.    Per protocol, can give home care orders if patient is schedule with pcp within 30 days.      Called Genny and left a secure VM.  Gave verbal orders per Dr. Castaneda for Order(s): Home Care Orders: Skilled Nursing:  verbal orders to discharge from skilled nursing early due to goals met.        Edwar Andrews CMA ([facility]) at 3:26 PM on 5/20/2022

## 2022-05-24 ENCOUNTER — VIRTUAL VISIT (OUTPATIENT)
Dept: INTERNAL MEDICINE | Facility: CLINIC | Age: 62
End: 2022-05-24
Payer: MEDICARE

## 2022-05-24 DIAGNOSIS — E11.42 TYPE 2 DIABETES MELLITUS WITH DIABETIC POLYNEUROPATHY, WITH LONG-TERM CURRENT USE OF INSULIN (H): ICD-10-CM

## 2022-05-24 DIAGNOSIS — Z79.4 TYPE 2 DIABETES MELLITUS WITH DIABETIC POLYNEUROPATHY, WITH LONG-TERM CURRENT USE OF INSULIN (H): ICD-10-CM

## 2022-05-24 DIAGNOSIS — Z09 HOSPITAL DISCHARGE FOLLOW-UP: Primary | ICD-10-CM

## 2022-05-24 DIAGNOSIS — N18.32 STAGE 3B CHRONIC KIDNEY DISEASE (H): ICD-10-CM

## 2022-05-24 DIAGNOSIS — I10 BENIGN ESSENTIAL HYPERTENSION: ICD-10-CM

## 2022-05-24 DIAGNOSIS — B37.49 CANDIDAL UTI (URINARY TRACT INFECTION): ICD-10-CM

## 2022-05-24 PROCEDURE — 99495 TRANSJ CARE MGMT MOD F2F 14D: CPT | Performed by: NURSE PRACTITIONER

## 2022-05-24 RX ORDER — FLUCONAZOLE 100 MG/1
100 TABLET ORAL DAILY
Qty: 14 TABLET | Refills: 0 | Status: SHIPPED | OUTPATIENT
Start: 2022-05-24 | End: 2022-06-07

## 2022-05-24 NOTE — PROGRESS NOTES
Isabell is a 61 year old who is being evaluated via a billable video visit.        Daughter Maria Guadalupe has a concern about Nifedipine ER 60mg she believes it was filled incorrectly with 90mg, but that her mom should be on 60mg. Please address with Maria Guadalupe.    How would you like to obtain your AVS? MyChart  If the video visit is dropped, the invitation should be resent by: Text to cell phone: [phone]  Will anyone else be joining your video visit? No      Video Start Time: 3:01 PM    Assessment & Plan     Hospital discharge follow-up  Overall improving since recent hospitalization per daughter Maria Guadalupe's report. Reviewed plan if Isabell were to become symptomatic or test positive for COVID, notify clinic immediately for virtual visit to discuss treatment options. She agrees with the plan.    Benign essential hypertension  Continues on Carvedilol 6.25 mg BID, Doxazosin 1 mg daily; Nifedipine currently on hold as the incorrect dose was filled by patient's pharmacy and her BPs have been consistently <120s/80s. Requested that Maria Guadalupe notify us if BPs begin to rise again and need to restart Nifedipine, would consider restarting at 30 mg given recent hypotension.    Candidal UTI (urinary tract infection)  UA/UC reviewed from recent hospitalization, +Candida albicans. Difficult historian, does appear she still has some lethargy, will opt to treat with Fluconazole (renally dosed) 100 mg daily x14 days.   - fluconazole (DIFLUCAN) 100 MG tablet; Take 1 tablet (100 mg) by mouth daily for 14 days    Stage 3b chronic kidney disease (H)  Serum Cr back at baseline upon discharge.    Type 2 diabetes mellitus with diabetic polyneuropathy, with long-term current use of insulin (H)  Blood sugars better controlled, PO intake improved since discharge, also using Glucerna to help supplement nutrition needs. Follow-up with Mini Esteves in endocrinology as scheduled.      48 minutes spent on the date of the encounter doing chart review, history and exam,  documentation and further activities per the note         Follow-up with Dr. Castaneda as scheduled on 6/13/22.    PEGGY Torres CNP  [facility] INTERNAL MEDICINE BERYL Whyte is a 61 year old who presents for the following health issues  accompanied by her daughter.    HPI     Post Discharge Outreach 5/16/2022   Admission Date 5/3/2022   Reason for Admission urinary tract infection and DKA and treated  with antibiotics and insulin   Discharge Date 5/14/2022   Discharge Diagnosis urinary tract infection and DKA and treated  with antibiotics and insulin   How are you doing now that you are home? She is doing good. She has been eating very well. Her blood pressure has been good.   How are your symptoms? (Red Flag symptoms escalate to triage hotline per guidelines) Improved   Do you feel your condition is stable enough to be safe at home until your provider visit? Yes   Does the patient have their discharge instructions?  Yes   Does the patient have questions regarding their discharge instructions?  No   Were you started on any new medications or were there changes to any of your previous medications?  Yes   Does the patient have all of their medications? Yes   Do you have questions regarding any of your medications?  No   Discharge follow-up appointment scheduled within 14 calendar days?  Yes   Discharge Follow Up Appointment Date 5/24/2022   Discharge Follow Up Appointment Scheduled with? Specialty Care Provider     Hospital Follow-up Visit:    Hospital/Nursing Home/IP Rehab Facility: [facility]  Date of Admission: 5/3/22  Date of Discharge: 5/14/22  Reason(s) for Admission: AMS/aphagia, found to have hyperglycemia 2/2 DKA and elevated LA; infectious work-up significant for UTI, completed 7 d course IV antibiotics      Was your hospitalization related to COVID-19? No   Problems taking medications regularly:  None  Medication  changes since discharge: Has not re-started Nifedipine; pharmacy filled a prior prescription for 90 mg (discharged on 60 mg), and BPs have been <120s/80s consistently OFF the nifedipine.   Problems adhering to non-medication therapy:  None    Summary of hospitalization:  [facility] discharge summary reviewed  -DKA, possibly provided in setting of UTI 2/2 aerococcus, reduced PO intake and skipped insulin as a result. Insulin adjusted while inpatient.   -Malnutrition--Difficulty with consistent PO intake. No sign of dysphagia or aspiration on swallow eval.  -EVON--Cr 1.84 on admission, 2/2 volume depletion due to DKA. Returned to baseline creatinine 1.2-1.45 prior to discharge.  -Hx CVA without acute stroke, vascular demential, acute metabolic toxic encephalopathy--concern for aphagia on arrival to ED and stroke code was called, no significant changes seen on CT head/CTA, MRI without evidence of acute stroke. Mentation improved following treatment of DKA and UTI. Referred to neuropsych on discharge.    Diagnostic Tests/Treatments reviewed.  Follow up needed: Urine culture + Candida albicans  Other Healthcare Providers Involved in Patient s Care:         Neuro-Stroke 6/1/22, Mini Esteves PA-C, in Endocrinology 6/7/22    Update since discharge: improved.       Post Discharge Medication Reconciliation: discharge medications reconciled and changed, per note/orders.  Plan of care communicated with caregiver     Maria Guadalupe tested positive for COVID right now, she's in the living room, trying to stay distanced as much as possible from [place]. Wearing masks.   Clarify Nifedipine dose: should be on 60 mg (not 90 mg). Stroke in April.    BP was low. Was advised to hold doxazosin or nifedipine if below a certain range. Since discharge, home /80, checking fairly often (before/after PT). Has been off nifedipine over the last week and a half.   PT has been helping.  Is eating better, is able to swallow better and is  "more active in general at home. Bad rash, treating with nystatin, rash has been improving. Had bowel incontinence while inpatient and for the first 5 days at home. Bowel incontinence has cleared up for the most part and urinary incontinence is not new.   Difficulty to determine if any urinary symptoms, also does not complain of any symptoms in general, \"suffer in silence.\" Still somewhat lethargic.  No fevers.  Supplementing with nutrition protein shakes, Glucerna, once a day. BGs have been good, avg 150s.     Estimated Creatinine Clearance: 41.1 mL/min (A) (based on SCr of 1.26 mg/dL (H)).               Review of Systems   Constitutional, HEENT, cardiovascular, pulmonary, gi and gu systems are negative, except as otherwise noted.      Objective    Vitals - Patient Reported  Weight (Patient Reported): 63.5 kg (140 lb)  Height (Patient Reported): 160 cm (5' 3\")  BMI (Based on Pt Reported Ht/Wt): 24.8    Unable to complete physical exam on telephone video screen as Maria Guadalupe is socially distancing from her mother due to her positive COVID.                Video-Visit Details    Type of service:  Video Visit    Video End Time:3:21 PM    Originating Location (pt. Location): Home    Distant Location (provider location):  [facility] INTERNAL MEDICINE [place]     Platform used for Video Visit: Aubree  "

## 2022-05-29 ENCOUNTER — TELEPHONE (OUTPATIENT)
Dept: FAMILY MEDICINE | Facility: CLINIC | Age: 62
End: 2022-05-29
Payer: MEDICARE

## 2022-05-29 ENCOUNTER — HOSPITAL ENCOUNTER (INPATIENT)
Facility: CLINIC | Age: 62
LOS: 1 days | Discharge: HOME | DRG: 637 | End: 2022-05-30
Attending: EMERGENCY MEDICINE | Admitting: INTERNAL MEDICINE
Payer: MEDICARE

## 2022-05-29 ENCOUNTER — APPOINTMENT (OUTPATIENT)
Dept: CT IMAGING | Facility: CLINIC | Age: 62
DRG: 637 | End: 2022-05-29
Attending: EMERGENCY MEDICINE
Payer: MEDICARE

## 2022-05-29 DIAGNOSIS — U07.1 INFECTION DUE TO 2019 NOVEL CORONAVIRUS: ICD-10-CM

## 2022-05-29 DIAGNOSIS — E11.65 TYPE 2 DIABETES MELLITUS WITH HYPERGLYCEMIA, WITH LONG-TERM CURRENT USE OF INSULIN (H): ICD-10-CM

## 2022-05-29 DIAGNOSIS — Z86.39 HISTORY OF INSULIN DEPENDENT DIABETES MELLITUS: ICD-10-CM

## 2022-05-29 DIAGNOSIS — E11.69 TYPE 2 DIABETES MELLITUS WITH OTHER SPECIFIED COMPLICATION, WITH LONG-TERM CURRENT USE OF INSULIN (H): ICD-10-CM

## 2022-05-29 DIAGNOSIS — R73.9 HYPERGLYCEMIA: ICD-10-CM

## 2022-05-29 DIAGNOSIS — Z79.4 TYPE 2 DIABETES MELLITUS WITH OTHER SPECIFIED COMPLICATION, WITH LONG-TERM CURRENT USE OF INSULIN (H): ICD-10-CM

## 2022-05-29 DIAGNOSIS — R41.82 ALTERED MENTAL STATUS, UNSPECIFIED ALTERED MENTAL STATUS TYPE: ICD-10-CM

## 2022-05-29 DIAGNOSIS — Z79.4 TYPE 2 DIABETES MELLITUS WITH HYPERGLYCEMIA, WITH LONG-TERM CURRENT USE OF INSULIN (H): ICD-10-CM

## 2022-05-29 DIAGNOSIS — E10.59 TYPE 1 DIABETES MELLITUS WITH OTHER CIRCULATORY COMPLICATION (H): ICD-10-CM

## 2022-05-29 LAB
ALBUMIN SERPL-MCNC: 3.6 G/DL (ref 3.4–5)
ALBUMIN UR-MCNC: 10 MG/DL
ALP SERPL-CCNC: 173 U/L (ref 40–150)
ALT SERPL W P-5'-P-CCNC: 26 U/L (ref 0–50)
ANION GAP SERPL CALCULATED.3IONS-SCNC: 14 MMOL/L (ref 3–14)
APPEARANCE UR: CLEAR
AST SERPL W P-5'-P-CCNC: 18 U/L (ref 0–45)
BACTERIA #/AREA URNS HPF: ABNORMAL /HPF
BASE EXCESS BLDV CALC-SCNC: -5.1 MMOL/L (ref -7.7–1.9)
BASOPHILS # BLD AUTO: 0 10E3/UL (ref 0–0.2)
BASOPHILS NFR BLD AUTO: 0 %
BILIRUB SERPL-MCNC: 0.4 MG/DL (ref 0.2–1.3)
BILIRUB UR QL STRIP: NEGATIVE
BUN SERPL-MCNC: 49 MG/DL (ref 7–30)
CALCIUM SERPL-MCNC: 9.7 MG/DL (ref 8.5–10.1)
CHLORIDE BLD-SCNC: 108 MMOL/L (ref 94–109)
CO2 SERPL-SCNC: 21 MMOL/L (ref 20–32)
COLOR UR AUTO: ABNORMAL
CREAT SERPL-MCNC: 1.71 MG/DL (ref 0.52–1.04)
CRP SERPL-MCNC: 11 MG/L (ref 0–8)
D DIMER PPP FEU-MCNC: 0.72 UG/ML FEU (ref 0–0.5)
EOSINOPHIL # BLD AUTO: 0 10E3/UL (ref 0–0.7)
EOSINOPHIL NFR BLD AUTO: 0 %
ERYTHROCYTE [DISTWIDTH] IN BLOOD BY AUTOMATED COUNT: 15.4 % (ref 10–15)
GFR SERPL CREATININE-BSD FRML MDRD: 33 ML/MIN/1.73M2
GLUCOSE BLD-MCNC: 592 MG/DL (ref 70–99)
GLUCOSE BLDC GLUCOMTR-MCNC: 196 MG/DL (ref 70–99)
GLUCOSE BLDC GLUCOMTR-MCNC: 240 MG/DL (ref 70–99)
GLUCOSE BLDC GLUCOMTR-MCNC: 295 MG/DL (ref 70–99)
GLUCOSE BLDC GLUCOMTR-MCNC: 420 MG/DL (ref 70–99)
GLUCOSE BLDC GLUCOMTR-MCNC: 441 MG/DL (ref 70–99)
GLUCOSE BLDC GLUCOMTR-MCNC: 449 MG/DL (ref 70–99)
GLUCOSE UR STRIP-MCNC: >=1000 MG/DL
HCO3 BLDV-SCNC: 21 MMOL/L (ref 21–28)
HCT VFR BLD AUTO: 37.8 % (ref 35–47)
HGB BLD-MCNC: 11.6 G/DL (ref 11.7–15.7)
HGB UR QL STRIP: NEGATIVE
IMM GRANULOCYTES # BLD: 0 10E3/UL
IMM GRANULOCYTES NFR BLD: 0 %
KETONES UR STRIP-MCNC: 40 MG/DL
LEUKOCYTE ESTERASE UR QL STRIP: NEGATIVE
LYMPHOCYTES # BLD AUTO: 0.9 10E3/UL (ref 0.8–5.3)
LYMPHOCYTES NFR BLD AUTO: 22 %
MAGNESIUM SERPL-MCNC: 2.3 MG/DL (ref 1.6–2.3)
MCH RBC QN AUTO: 28.7 PG (ref 26.5–33)
MCHC RBC AUTO-ENTMCNC: 30.7 G/DL (ref 31.5–36.5)
MCV RBC AUTO: 94 FL (ref 78–100)
MONOCYTES # BLD AUTO: 0.4 10E3/UL (ref 0–1.3)
MONOCYTES NFR BLD AUTO: 10 %
NEUTROPHILS # BLD AUTO: 3 10E3/UL (ref 1.6–8.3)
NEUTROPHILS NFR BLD AUTO: 68 %
NITRATE UR QL: POSITIVE
NRBC # BLD AUTO: 0 10E3/UL
NRBC BLD AUTO-RTO: 0 /100
O2/TOTAL GAS SETTING VFR VENT: 21 %
OSMOLALITY SERPL: 340 MMOL/KG (ref 280–301)
PCO2 BLDV: 44 MM HG (ref 40–50)
PH BLDV: 7.29 [PH] (ref 7.32–7.43)
PH UR STRIP: 5.5 [PH] (ref 5–7)
PLATELET # BLD AUTO: 175 10E3/UL (ref 150–450)
PO2 BLDV: 33 MM HG (ref 25–47)
POTASSIUM BLD-SCNC: 5 MMOL/L (ref 3.4–5.3)
PROT SERPL-MCNC: 7.5 G/DL (ref 6.8–8.8)
RBC # BLD AUTO: 4.04 10E6/UL (ref 3.8–5.2)
RBC URINE: <1 /HPF
SARS-COV-2 RNA RESP QL NAA+PROBE: POSITIVE
SODIUM SERPL-SCNC: 143 MMOL/L (ref 133–144)
SP GR UR STRIP: 1.02 (ref 1–1.03)
UROBILINOGEN UR STRIP-MCNC: NORMAL MG/DL
WBC # BLD AUTO: 4.3 10E3/UL (ref 4–11)
WBC URINE: 5 /HPF

## 2022-05-29 PROCEDURE — 85025 COMPLETE CBC W/AUTO DIFF WBC: CPT | Performed by: EMERGENCY MEDICINE

## 2022-05-29 PROCEDURE — 258N000003 HC RX IP 258 OP 636: Performed by: STUDENT IN AN ORGANIZED HEALTH CARE EDUCATION/TRAINING PROGRAM

## 2022-05-29 PROCEDURE — G1004 CDSM NDSC: HCPCS | Performed by: RADIOLOGY

## 2022-05-29 PROCEDURE — 70450 CT HEAD/BRAIN W/O DYE: CPT | Mod: 26 | Performed by: RADIOLOGY

## 2022-05-29 PROCEDURE — 81001 URINALYSIS AUTO W/SCOPE: CPT | Performed by: EMERGENCY MEDICINE

## 2022-05-29 PROCEDURE — 80053 COMPREHEN METABOLIC PANEL: CPT | Performed by: EMERGENCY MEDICINE

## 2022-05-29 PROCEDURE — 99285 EMERGENCY DEPT VISIT HI MDM: CPT | Mod: 25 | Performed by: EMERGENCY MEDICINE

## 2022-05-29 PROCEDURE — 250N000011 HC RX IP 250 OP 636: Performed by: STUDENT IN AN ORGANIZED HEALTH CARE EDUCATION/TRAINING PROGRAM

## 2022-05-29 PROCEDURE — 96361 HYDRATE IV INFUSION ADD-ON: CPT | Performed by: EMERGENCY MEDICINE

## 2022-05-29 PROCEDURE — 99233 SBSQ HOSP IP/OBS HIGH 50: CPT | Mod: GC | Performed by: INTERNAL MEDICINE

## 2022-05-29 PROCEDURE — 82803 BLOOD GASES ANY COMBINATION: CPT | Performed by: EMERGENCY MEDICINE

## 2022-05-29 PROCEDURE — 86140 C-REACTIVE PROTEIN: CPT | Performed by: STUDENT IN AN ORGANIZED HEALTH CARE EDUCATION/TRAINING PROGRAM

## 2022-05-29 PROCEDURE — 258N000003 HC RX IP 258 OP 636: Performed by: EMERGENCY MEDICINE

## 2022-05-29 PROCEDURE — 87106 FUNGI IDENTIFICATION YEAST: CPT | Performed by: EMERGENCY MEDICINE

## 2022-05-29 PROCEDURE — 96372 THER/PROPH/DIAG INJ SC/IM: CPT | Performed by: EMERGENCY MEDICINE

## 2022-05-29 PROCEDURE — C9803 HOPD COVID-19 SPEC COLLECT: HCPCS | Performed by: EMERGENCY MEDICINE

## 2022-05-29 PROCEDURE — 36415 COLL VENOUS BLD VENIPUNCTURE: CPT | Performed by: EMERGENCY MEDICINE

## 2022-05-29 PROCEDURE — 96365 THER/PROPH/DIAG IV INF INIT: CPT | Performed by: EMERGENCY MEDICINE

## 2022-05-29 PROCEDURE — 85379 FIBRIN DEGRADATION QUANT: CPT | Performed by: STUDENT IN AN ORGANIZED HEALTH CARE EDUCATION/TRAINING PROGRAM

## 2022-05-29 PROCEDURE — 83930 ASSAY OF BLOOD OSMOLALITY: CPT | Performed by: STUDENT IN AN ORGANIZED HEALTH CARE EDUCATION/TRAINING PROGRAM

## 2022-05-29 PROCEDURE — 96375 TX/PRO/DX INJ NEW DRUG ADDON: CPT | Performed by: EMERGENCY MEDICINE

## 2022-05-29 PROCEDURE — 250N000011 HC RX IP 250 OP 636: Performed by: EMERGENCY MEDICINE

## 2022-05-29 PROCEDURE — G1004 CDSM NDSC: HCPCS

## 2022-05-29 PROCEDURE — 36415 COLL VENOUS BLD VENIPUNCTURE: CPT | Performed by: STUDENT IN AN ORGANIZED HEALTH CARE EDUCATION/TRAINING PROGRAM

## 2022-05-29 PROCEDURE — 250N000011 HC RX IP 250 OP 636

## 2022-05-29 PROCEDURE — 83735 ASSAY OF MAGNESIUM: CPT | Performed by: STUDENT IN AN ORGANIZED HEALTH CARE EDUCATION/TRAINING PROGRAM

## 2022-05-29 PROCEDURE — 250N000013 HC RX MED GY IP 250 OP 250 PS 637

## 2022-05-29 PROCEDURE — U0003 INFECTIOUS AGENT DETECTION BY NUCLEIC ACID (DNA OR RNA); SEVERE ACUTE RESPIRATORY SYNDROME CORONAVIRUS 2 (SARS-COV-2) (CORONAVIRUS DISEASE [COVID-19]), AMPLIFIED PROBE TECHNIQUE, MAKING USE OF HIGH THROUGHPUT TECHNOLOGIES AS DESCRIBED BY CMS-2020-01-R: HCPCS | Performed by: EMERGENCY MEDICINE

## 2022-05-29 PROCEDURE — 120N000002 HC R&B MED SURG/OB UMMC

## 2022-05-29 PROCEDURE — 250N000012 HC RX MED GY IP 250 OP 636 PS 637: Performed by: STUDENT IN AN ORGANIZED HEALTH CARE EDUCATION/TRAINING PROGRAM

## 2022-05-29 PROCEDURE — 99285 EMERGENCY DEPT VISIT HI MDM: CPT | Performed by: EMERGENCY MEDICINE

## 2022-05-29 PROCEDURE — 250N000012 HC RX MED GY IP 250 OP 636 PS 637: Performed by: EMERGENCY MEDICINE

## 2022-05-29 PROCEDURE — 250N000013 HC RX MED GY IP 250 OP 250 PS 637: Performed by: STUDENT IN AN ORGANIZED HEALTH CARE EDUCATION/TRAINING PROGRAM

## 2022-05-29 RX ORDER — PREGABALIN 75 MG/1
75 CAPSULE ORAL 2 TIMES DAILY
Status: DISCONTINUED | OUTPATIENT
Start: 2022-05-29 | End: 2022-05-30 | Stop reason: HOSPADM

## 2022-05-29 RX ORDER — LIDOCAINE 40 MG/G
CREAM TOPICAL
Status: DISCONTINUED | OUTPATIENT
Start: 2022-05-29 | End: 2022-05-30 | Stop reason: HOSPADM

## 2022-05-29 RX ORDER — DULOXETIN HYDROCHLORIDE 20 MG/1
20 CAPSULE, DELAYED RELEASE ORAL DAILY
Status: DISCONTINUED | OUTPATIENT
Start: 2022-05-29 | End: 2022-05-30 | Stop reason: HOSPADM

## 2022-05-29 RX ORDER — LEVETIRACETAM 500 MG/1
500 TABLET ORAL 2 TIMES DAILY
Status: DISCONTINUED | OUTPATIENT
Start: 2022-05-29 | End: 2022-05-30 | Stop reason: HOSPADM

## 2022-05-29 RX ORDER — NICOTINE POLACRILEX 4 MG
15-30 LOZENGE BUCCAL
Status: DISCONTINUED | OUTPATIENT
Start: 2022-05-29 | End: 2022-05-30 | Stop reason: HOSPADM

## 2022-05-29 RX ORDER — ENOXAPARIN SODIUM 100 MG/ML
40 INJECTION SUBCUTANEOUS EVERY 24 HOURS
Status: DISCONTINUED | OUTPATIENT
Start: 2022-05-29 | End: 2022-05-30 | Stop reason: HOSPADM

## 2022-05-29 RX ORDER — DOXAZOSIN 1 MG/1
1 TABLET ORAL AT BEDTIME
Status: DISCONTINUED | OUTPATIENT
Start: 2022-05-29 | End: 2022-05-30 | Stop reason: HOSPADM

## 2022-05-29 RX ORDER — ASPIRIN 81 MG/1
81 TABLET, CHEWABLE ORAL DAILY
Status: DISCONTINUED | OUTPATIENT
Start: 2022-05-29 | End: 2022-05-30 | Stop reason: HOSPADM

## 2022-05-29 RX ORDER — INSULIN LISPRO 100 [IU]/ML
1-10 INJECTION, SOLUTION INTRAVENOUS; SUBCUTANEOUS
Status: DISCONTINUED | OUTPATIENT
Start: 2022-05-29 | End: 2022-05-30 | Stop reason: HOSPADM

## 2022-05-29 RX ORDER — DEXTROSE MONOHYDRATE 25 G/50ML
25-50 INJECTION, SOLUTION INTRAVENOUS
Status: DISCONTINUED | OUTPATIENT
Start: 2022-05-29 | End: 2022-05-30 | Stop reason: HOSPADM

## 2022-05-29 RX ORDER — HYDRALAZINE HYDROCHLORIDE 20 MG/ML
10 INJECTION INTRAMUSCULAR; INTRAVENOUS EVERY 6 HOURS PRN
Status: DISCONTINUED | OUTPATIENT
Start: 2022-05-29 | End: 2022-05-30 | Stop reason: HOSPADM

## 2022-05-29 RX ORDER — CEFTRIAXONE 1 G/1
1 INJECTION, POWDER, FOR SOLUTION INTRAMUSCULAR; INTRAVENOUS ONCE
Status: COMPLETED | OUTPATIENT
Start: 2022-05-29 | End: 2022-05-29

## 2022-05-29 RX ORDER — NYSTATIN 100000 U/G
CREAM TOPICAL 2 TIMES DAILY
Status: DISCONTINUED | OUTPATIENT
Start: 2022-05-29 | End: 2022-05-30 | Stop reason: HOSPADM

## 2022-05-29 RX ORDER — AMOXICILLIN 250 MG
2 CAPSULE ORAL 2 TIMES DAILY PRN
Status: DISCONTINUED | OUTPATIENT
Start: 2022-05-29 | End: 2022-05-30 | Stop reason: HOSPADM

## 2022-05-29 RX ORDER — ONDANSETRON 4 MG/1
4 TABLET, ORALLY DISINTEGRATING ORAL EVERY 6 HOURS PRN
Status: DISCONTINUED | OUTPATIENT
Start: 2022-05-29 | End: 2022-05-30 | Stop reason: HOSPADM

## 2022-05-29 RX ORDER — CARVEDILOL 6.25 MG/1
6.25 TABLET ORAL 2 TIMES DAILY WITH MEALS
Status: DISCONTINUED | OUTPATIENT
Start: 2022-05-29 | End: 2022-05-30 | Stop reason: HOSPADM

## 2022-05-29 RX ORDER — LEVOTHYROXINE SODIUM 88 UG/1
88 TABLET ORAL DAILY
Status: DISCONTINUED | OUTPATIENT
Start: 2022-05-29 | End: 2022-05-30 | Stop reason: HOSPADM

## 2022-05-29 RX ORDER — LORATADINE 10 MG/1
10 TABLET ORAL DAILY
Status: DISCONTINUED | OUTPATIENT
Start: 2022-05-29 | End: 2022-05-30 | Stop reason: HOSPADM

## 2022-05-29 RX ORDER — ATORVASTATIN CALCIUM 40 MG/1
40 TABLET, FILM COATED ORAL DAILY
Status: DISCONTINUED | OUTPATIENT
Start: 2022-05-29 | End: 2022-05-30 | Stop reason: HOSPADM

## 2022-05-29 RX ORDER — INSULIN LISPRO 100 [IU]/ML
1-7 INJECTION, SOLUTION INTRAVENOUS; SUBCUTANEOUS AT BEDTIME
Status: DISCONTINUED | OUTPATIENT
Start: 2022-05-29 | End: 2022-05-30 | Stop reason: HOSPADM

## 2022-05-29 RX ORDER — ACETAMINOPHEN 325 MG/1
650 TABLET ORAL EVERY 6 HOURS PRN
Status: DISCONTINUED | OUTPATIENT
Start: 2022-05-29 | End: 2022-05-30 | Stop reason: HOSPADM

## 2022-05-29 RX ORDER — HYDRALAZINE HYDROCHLORIDE 25 MG/1
25 TABLET, FILM COATED ORAL 3 TIMES DAILY
Status: DISCONTINUED | OUTPATIENT
Start: 2022-05-29 | End: 2022-05-30 | Stop reason: HOSPADM

## 2022-05-29 RX ORDER — ONDANSETRON 2 MG/ML
4 INJECTION INTRAMUSCULAR; INTRAVENOUS EVERY 6 HOURS PRN
Status: DISCONTINUED | OUTPATIENT
Start: 2022-05-29 | End: 2022-05-30 | Stop reason: HOSPADM

## 2022-05-29 RX ORDER — AMOXICILLIN 250 MG
1 CAPSULE ORAL 2 TIMES DAILY PRN
Status: DISCONTINUED | OUTPATIENT
Start: 2022-05-29 | End: 2022-05-30 | Stop reason: HOSPADM

## 2022-05-29 RX ADMIN — NYSTATIN: 100000 CREAM TOPICAL at 12:16

## 2022-05-29 RX ADMIN — CARVEDILOL 6.25 MG: 6.25 TABLET, FILM COATED ORAL at 18:36

## 2022-05-29 RX ADMIN — DOXAZOSIN 1 MG: 1 TABLET ORAL at 21:33

## 2022-05-29 RX ADMIN — NYSTATIN: 100000 CREAM TOPICAL at 21:44

## 2022-05-29 RX ADMIN — LORATADINE 10 MG: 10 TABLET ORAL at 08:06

## 2022-05-29 RX ADMIN — INSULIN ASPART 10 UNITS: 100 INJECTION, SOLUTION INTRAVENOUS; SUBCUTANEOUS at 01:19

## 2022-05-29 RX ADMIN — CARVEDILOL 6.25 MG: 6.25 TABLET, FILM COATED ORAL at 08:06

## 2022-05-29 RX ADMIN — SODIUM CHLORIDE 1000 ML: 9 INJECTION, SOLUTION INTRAVENOUS at 01:19

## 2022-05-29 RX ADMIN — ASPIRIN 81 MG: 81 TABLET, CHEWABLE ORAL at 08:06

## 2022-05-29 RX ADMIN — SODIUM CHLORIDE, POTASSIUM CHLORIDE, SODIUM LACTATE AND CALCIUM CHLORIDE 1000 ML: 600; 310; 30; 20 INJECTION, SOLUTION INTRAVENOUS at 03:46

## 2022-05-29 RX ADMIN — INSULIN LISPRO 7 UNITS: 100 INJECTION, SOLUTION INTRAVENOUS; SUBCUTANEOUS at 18:36

## 2022-05-29 RX ADMIN — HYDRALAZINE HYDROCHLORIDE 10 MG: 20 INJECTION, SOLUTION INTRAMUSCULAR; INTRAVENOUS at 14:25

## 2022-05-29 RX ADMIN — INSULIN GLARGINE 18 UNITS: 100 INJECTION, SOLUTION SUBCUTANEOUS at 04:40

## 2022-05-29 RX ADMIN — DOXAZOSIN 1 MG: 1 TABLET ORAL at 04:40

## 2022-05-29 RX ADMIN — CEFTRIAXONE SODIUM 1 G: 1 INJECTION, POWDER, FOR SOLUTION INTRAMUSCULAR; INTRAVENOUS at 03:46

## 2022-05-29 RX ADMIN — ATORVASTATIN CALCIUM 40 MG: 40 TABLET, FILM COATED ORAL at 08:06

## 2022-05-29 RX ADMIN — INSULIN LISPRO 7 UNITS: 100 INJECTION, SOLUTION INTRAVENOUS; SUBCUTANEOUS at 21:39

## 2022-05-29 RX ADMIN — ENOXAPARIN SODIUM 40 MG: 40 INJECTION SUBCUTANEOUS at 06:51

## 2022-05-29 RX ADMIN — DULOXETINE HYDROCHLORIDE 20 MG: 20 CAPSULE, DELAYED RELEASE ORAL at 08:06

## 2022-05-29 RX ADMIN — PREGABALIN 75 MG: 75 CAPSULE ORAL at 08:06

## 2022-05-29 RX ADMIN — HYDRALAZINE HYDROCHLORIDE 25 MG: 25 TABLET, FILM COATED ORAL at 19:41

## 2022-05-29 RX ADMIN — INSULIN LISPRO 3 UNITS: 100 INJECTION, SOLUTION INTRAVENOUS; SUBCUTANEOUS at 12:15

## 2022-05-29 RX ADMIN — LEVETIRACETAM 500 MG: 500 TABLET, FILM COATED ORAL at 08:06

## 2022-05-29 RX ADMIN — INSULIN LISPRO 5 UNITS: 100 INJECTION, SOLUTION INTRAVENOUS; SUBCUTANEOUS at 08:03

## 2022-05-29 RX ADMIN — INSULIN LISPRO 7 UNITS: 100 INJECTION, SOLUTION INTRAVENOUS; SUBCUTANEOUS at 04:44

## 2022-05-29 RX ADMIN — PREGABALIN 75 MG: 75 CAPSULE ORAL at 19:40

## 2022-05-29 RX ADMIN — INSULIN GLARGINE 18 UNITS: 100 INJECTION, SOLUTION SUBCUTANEOUS at 23:08

## 2022-05-29 RX ADMIN — LEVETIRACETAM 500 MG: 500 TABLET, FILM COATED ORAL at 19:40

## 2022-05-29 RX ADMIN — LEVOTHYROXINE SODIUM 88 MCG: 88 TABLET ORAL at 08:06

## 2022-05-29 ASSESSMENT — ACTIVITIES OF DAILY LIVING (ADL)
ADLS_ACUITY_SCORE: 35

## 2022-05-29 NOTE — ED TRIAGE NOTES
Pt BIBA with c/o high blood sugars for the last few days. Pt unable to take insulin d/t high blood sugars and loss of appetite. Daughter who has COVID, PMH of stroke. Pt is fully vaccinated.

## 2022-05-29 NOTE — ED PROVIDER NOTES
History     Chief Complaint   Patient presents with     Hyperglycemia     HPI  Isabell Matthews is a 61 year old female with PMH notable for DM2, prior stroke with resulting vascular dementia who presents to the ED with hyperglycemia and altered mental status.  History primarily from the patient's daughter due to the patient's mental status.  Patient daughter reports that she has had difficulty caring for the patient since she (the daughter) got COVID this past week.  The patient has had decreased verbalization, still answer simple questions but does not really talk otherwise.  She speaks less at baseline due to past strokes.  There is been difficulty managing the patient's glucose.  He did not give Lantus this past day, did the previous days.  The patient has been eating very little so they have been unsure how much insulin to give her.  She primarily uses sliding scale insulin with the long-acting Lantus.  No cough, no fever, no vomiting, no diarrhea.  Patient's daughter notes that the had an antibiotic prescribed for a perineal rash and that the patient had recent UTI.  EMS noted glucose greater than 500.    Past Medical and Surgical History, Medications, Allergies, and Social History were reviewed in the electronic medical record. Review with the patient was attempted but limited due to altered mental status.      Review of Systems  A complete review of systems was attempted but limited due to altered mental status.    Physical Exam   BP: (!) 150/78  Pulse: 96  Temp: 98.5  F (36.9  C)  Resp: 18  SpO2: 96 %    Physical Exam  General: no acute distress. Appears stated age.   HENT: dry MM, no oropharyngeal lesions  Eyes: PERRL, normal sclerae  Neck: non-tender, supple  Cardio: regular rate. Regular rhythm. Extremities well perfused  Resp: Normal work of breathing, normal respiratory rate.  Chest/Back: no visual signs of trauma, no CVA tenderness  Abdomen: suprapubic only tenderness, non-distended, no rebound, no  guarding  Neuro: alert, not oriented, answers yes/no questions. CN II-XII grossly intact. Grossly normal strength and sensation in all extremities.   MSK: no deformities. Grossly normal ROM in extremities.   Psych: normal affect, normal behavior    ED Course      Procedures          Labs Ordered and Resulted from Time of ED Arrival to Time of ED Departure   COMPREHENSIVE METABOLIC PANEL - Abnormal       Result Value    Sodium 143      Potassium 5.0      Chloride 108      Carbon Dioxide (CO2) 21      Anion Gap 14      Urea Nitrogen 49 (*)     Creatinine 1.71 (*)     Calcium 9.7      Glucose 592 (*)     Alkaline Phosphatase 173 (*)     AST 18      ALT 26      Protein Total 7.5      Albumin 3.6      Bilirubin Total 0.4      GFR Estimate 33 (*)    BLOOD GAS VENOUS - Abnormal    pH Venous 7.29 (*)     pCO2 Venous 44      pO2 Venous 33      Bicarbonate Venous 21      Base Excess/Deficit (+/-) -5.1      FIO2 21     ROUTINE UA WITH MICROSCOPIC REFLEX TO CULTURE - Abnormal    Color Urine Light Yellow      Appearance Urine Clear      Glucose Urine >=1000 (*)     Bilirubin Urine Negative      Ketones Urine 40  (*)     Specific Gravity Urine 1.024      Blood Urine Negative      pH Urine 5.5      Protein Albumin Urine 10  (*)     Urobilinogen Urine Normal      Nitrite Urine Positive (*)     Leukocyte Esterase Urine Negative      Bacteria Urine Few (*)     RBC Urine <1      WBC Urine 5     COVID-19 VIRUS (CORONAVIRUS) BY PCR - Abnormal    SARS CoV2 PCR Positive (*)    CBC WITH PLATELETS AND DIFFERENTIAL - Abnormal    WBC Count 4.3      RBC Count 4.04      Hemoglobin 11.6 (*)     Hematocrit 37.8      MCV 94      MCH 28.7      MCHC 30.7 (*)     RDW 15.4 (*)     Platelet Count 175      % Neutrophils 68      % Lymphocytes 22      % Monocytes 10      % Eosinophils 0      % Basophils 0      % Immature Granulocytes 0      NRBCs per 100 WBC 0      Absolute Neutrophils 3.0      Absolute Lymphocytes 0.9      Absolute Monocytes 0.4       Absolute Eosinophils 0.0      Absolute Basophils 0.0      Absolute Immature Granulocytes 0.0      Absolute NRBCs 0.0     GLUCOSE BY METER - Abnormal    GLUCOSE BY METER POCT 441 (*)    GLUCOSE MONITOR NURSING POCT   CRP INFLAMMATION   D DIMER QUANTITATIVE   OSMOLALITY   URINE CULTURE     CT Head w/o Contrast   Final Result   IMPRESSION:   1.  No CT evidence for acute intracranial process.   2.  Stable chronic changes as above.             Assessments & Plan (with Medical Decision Making)   Patient presenting with hyperglycemia and decreased level of interaction. Vitals in the ED with elevated blood pressure, otherwise unremarkable. Nursing notes reviewed. Initial differential diagnosis includes but not limited to COVID-19, UTI, DKA, HHS, hyperglycemia, hypovolemia.     Initial glucose 592.  10 units aspart and 1 L NS bolus.  Glucose improved to 441 on recheck.  No anion gap elevation, pH 7.29.  Not consistent with significant DKA.  Creatinine mildly elevated at 1.71 from previous of about 1.4.  UA nitrite positive without other signs of UTI, ceftriaxone given.  CT head without evidence of acute pathology.    COVID-19 positive.  No shortness of breath nor SPO2 reduction.  Patient is COVID vaccinated.    The complete clinical picture is most consistent with hyperglycemia secondary to DM 2 and COVID-19. After counseling on the diagnosis, work-up, and treatment plan, the patient was admitted to medicine.      Final diagnoses:   Infection due to 2019 novel coronavirus   Hyperglycemia   Altered mental status, unspecified altered mental status type   Type 2 diabetes mellitus with other specified complication, with long-term current use of insulin (H)     New Prescriptions    No medications on file       --  Abdi Luu MD   Emergency Medicine   [facility] EMERGENCY DEPARTMENT  5/29/2022     Abdi Luu MD  05/29/22 0319

## 2022-05-29 NOTE — H&P
[facility] History and Physical    Isabell Matthews MRN# [id number]   Age: 61 year old YOB: 1960     Date of Admission: 5/29/2022         Assessment and Plan     Isabell Matthews is a 61 year old female with PMHx of T2DM, CVA, vascular dementia, neuropathy, HLD, and CKD (baseline Cr ~1.1-1.3) admitted from the ED for management of EVON and hyperglycemia  - Found to be COVID+    # Hyperglycemia 2/2 T2DM  Presented with glucose of 592. Most likely due to caregiver not being able to adequately care due to having COVID. Received 10 units aspart in the ED. Will continue with PTA regimen  - C/w PTA glargine 18 units  - C/w PTA sliding scale insulin regimen (high resistance)  - On hypoglycemia protocol    # EVON on CKD  Presented with Cr of 1.71. Baseline Cr ~1.1-1.3. Suspect 2/2 hypovolemia due to hyperglycemia. Provided 1L NS in the ED.   - Added on osmolality  - Provided additional liter of LR over 6 hours for gentle rehydration  - Strict I/Os. Daily weights. Avoid nephrotoxic agents  - Trend BMP    # COVID19+ infection  Found to be COVID+ on admission. No fever/cough/hypoxia. Fully vaccinated per daughter. Will monitor for progression given risk factors of T2DM and age. No antivirals or dexamethasone currently  - Trend inflammatory markers  - SpO2 with goal >90%    -----------------------------------  Chronic Medical Problems  -----------------------------------  # CVA  # HTN  # Neuropathy  - C/w PTA ASA  - C/w PTA carvedilol  - C/w PTA doxazosin  - C/w PTA duloxetine  - C/w PTA pregabalin    # HLD  # Hypothyroidism  - C/w PTA atorvastatin  - C/w PTA levothyroxine    # Vascular dementia  - C/w PTA levetiracetam  - On delirium precautions    FEN: no mIVF; normal K/Mg; carb consistent diet  PPx: Lovenox  Dispo: Admit to inpatient  Code: FULL, discussed on admission    Expected discharge: 2 - 3 days; recommended to prior living arrangement once mental status at baseline, renal function improved  and safe disposition plan/ TCU bed available.    Patient to be formally staffed in the AM with primary team.     Paul Will M.D.  Med/Derm PGY-4  P: [phone]         Chief Complaint:     Hyperglycemia         History of Present Illness (Resident / Clinician):   This patient is a 61 year old female with a PMHx of T2DM, CVA, vascular dementia, neuropathy, HLD, and CKD (baseline Cr ~1.1-1.3) presenting with hyperglycemia    History is obtained from the electronic health record, emergency department physician and patient's daughter. Reports:    - Recently discharged from hospital  - Daughter normally is primary caregiver. However, has had difficulty recently due to COVID19 infection  - Daughter reports that FSBGs at home have been in 500s and she's had difficulty managing it  - Thinks that patient is slightly less responsive than normal  - Has not noticed any fever, cough, SOB, diarrhea, or other COVID symptoms in patient    Of note, had received Pfizer vaccine x3. Received a dose of CTX in the ED for positive nitrite on UA            Past Medical History:     Past Medical History:   Diagnosis Date     Chronic pain      Coronary atherosclerosis 6/14/2016     Essential hypertension      Ex-cigarette smoker     quit 7/2018 over 35 years     Ex-cigarette smoker      Hyperlipidemia      Hypothyroid      Seizures (H)      Stroke (H)      Vascular dementia (H)              Past Surgical History:      Past Surgical History:   Procedure Laterality Date     athroplasty hip Bilateral     2003, 2006     OTHER SURGICAL HISTORY      athroplasty hip     STENT               Social History:     Social History     Tobacco Use     Smoking status: Former Smoker     Packs/day: 0.50     Years: 35.00     Pack years: 17.50     Types: Cigarettes     Quit date: 7/1/2018     Years since quitting: 3.9     Smokeless tobacco: Never Used   Substance Use Topics     Alcohol use: Not Currently             Family History:     Family History    Problem Relation Age of Onset     Acute Myocardial Infarction Father      Heart Disease Maternal Grandmother      Dementia Maternal Grandmother      Myocardial Infarction Father      Dementia Paternal Grandmother      Heart Disease Paternal Grandmother       Family history reviewed and updated in EPIC          Immunizations:   Immunizations are current          Allergies:     Allergies   Allergen Reactions     Bee Pollen Headache     Pollen Extract Headache             Medications:   (Not in a hospital admission)            Review of Systems:     The Review of Systems is negative other than noted in the HPI           Data:   Vitals  Temp: 98.5  F (36.9  C) Temp src: Oral BP: (!) 163/100 Pulse: 91   Resp: 18 SpO2: 96 %          Intake/Output Summary (Last 24 hours) at 5/29/2022 0343  Last data filed at 5/29/2022 0219  Gross per 24 hour   Intake 1000 ml   Output --   Net 1000 ml       Physical Exam  Constitutional: NAD; laying in bed comfortably; non-verbal  HEENT: EOMI; able to track  Chest: CTAB  Cardio: S1/S2 present; RRR  Abdomen: soft, non-tender, non-distended  Extremities: wwp; no edema  Neuro: no-verbal; appears to answer with simple nods and moving R hand  Psych: cooperative affect    Labs  CBC  Recent Labs   Lab 05/29/22 0116   WBC 4.3   RBC 4.04   HGB 11.6*   HCT 37.8   MCV 94   MCH 28.7   MCHC 30.7*   RDW 15.4*          BMP  Recent Labs   Lab 05/29/22 0227 05/29/22 0116   NA  --  143   POTASSIUM  --  5.0   CHLORIDE  --  108   CO2  --  21   ANIONGAP  --  14   * 592*   BUN  --  49*   CR  --  1.71*   GFRESTIMATED  --  33*   VERONICA  --  9.7   MAG  --  2.3        INRNo lab results found in last 7 days.    Liver panel  Recent Labs   Lab 05/29/22 0116   PROTTOTAL 7.5   ALBUMIN 3.6   BILITOTAL 0.4   ALKPHOS 173*   AST 18   ALT 26       Urinalysis  Recent Labs   Lab Test 05/29/22 0214 04/06/22  0054 07/09/21  1000   COLOR Light Yellow   < > Light Yellow   APPEARANCE Clear   < > Slightly Cloudy*    URINEGLC >=1000*   < > >1000 mg/dL*   URINEBILI Negative   < > Negative   URINEKETONE 40 *   < > Negative   SG 1.024   < > 1.020   UBLD Negative   < > Negative   URINEPH 5.5   < > 5.0   PROTEIN 10 *   < > Negative   UROBILINOGEN  --   --  <2.0 mg/dL   NITRITE Positive*   < > Negative   LEUKEST Negative   < > Negative   RBCU <1   < > 0   WBCU 5   < > 1    < > = values in this interval not displayed.     Imaging  CT head (05/29/22)  IMPRESSION:  1.  No CT evidence for acute intracranial process.  2.  Stable chronic changes as above.    Procedures  TTE (4/2022)  Interpretation Summary  Global and regional left ventricular function is normal with an EF of 55-60%.  Global right ventricular function is normal. The right ventricle is normal  size.  No significant valvular abnormalities.  IVC diameter <2.1 cm collapsing >50% with sniff suggests a normal RA pressure  of 3 mmHg.  There is no prior study for direct comparison.

## 2022-05-29 NOTE — PROGRESS NOTES
Patient is non-verbal and may answer yes or no ocassionally, tries to feed her lunch and did not open her mouth all the time, ate very little. Daughter talked to her on the phone and when asked, the daughter stated that her mom has been like this, may very little at a time.  BP has been elevated, and team were notified, IV hydralazine given with BP of 181/94.  .  R PIV is saline locked. BS also has been running high too, 196 this afternoon. Continue to monitor.

## 2022-05-30 VITALS
HEART RATE: 89 BPM | HEIGHT: 65 IN | DIASTOLIC BLOOD PRESSURE: 66 MMHG | RESPIRATION RATE: 18 BRPM | WEIGHT: 140 LBS | OXYGEN SATURATION: 97 % | SYSTOLIC BLOOD PRESSURE: 123 MMHG | BODY MASS INDEX: 23.32 KG/M2 | TEMPERATURE: 98.1 F

## 2022-05-30 LAB
ANION GAP SERPL CALCULATED.3IONS-SCNC: 6 MMOL/L (ref 3–14)
BUN SERPL-MCNC: 26 MG/DL (ref 7–30)
CALCIUM SERPL-MCNC: 9.3 MG/DL (ref 8.5–10.1)
CHLORIDE BLD-SCNC: 111 MMOL/L (ref 94–109)
CO2 SERPL-SCNC: 25 MMOL/L (ref 20–32)
CREAT SERPL-MCNC: 1.14 MG/DL (ref 0.52–1.04)
ERYTHROCYTE [DISTWIDTH] IN BLOOD BY AUTOMATED COUNT: 15.4 % (ref 10–15)
GFR SERPL CREATININE-BSD FRML MDRD: 54 ML/MIN/1.73M2
GLUCOSE BLD-MCNC: 176 MG/DL (ref 70–99)
GLUCOSE BLDC GLUCOMTR-MCNC: 151 MG/DL (ref 70–99)
GLUCOSE BLDC GLUCOMTR-MCNC: 155 MG/DL (ref 70–99)
HCT VFR BLD AUTO: 38.9 % (ref 35–47)
HGB BLD-MCNC: 12.1 G/DL (ref 11.7–15.7)
MCH RBC QN AUTO: 28.5 PG (ref 26.5–33)
MCHC RBC AUTO-ENTMCNC: 31.1 G/DL (ref 31.5–36.5)
MCV RBC AUTO: 92 FL (ref 78–100)
PLATELET # BLD AUTO: 161 10E3/UL (ref 150–450)
POTASSIUM BLD-SCNC: 3.6 MMOL/L (ref 3.4–5.3)
RBC # BLD AUTO: 4.24 10E6/UL (ref 3.8–5.2)
SODIUM SERPL-SCNC: 142 MMOL/L (ref 133–144)
WBC # BLD AUTO: 4.3 10E3/UL (ref 4–11)

## 2022-05-30 PROCEDURE — 99238 HOSP IP/OBS DSCHRG MGMT 30/<: CPT | Mod: GC | Performed by: INTERNAL MEDICINE

## 2022-05-30 PROCEDURE — 250N000011 HC RX IP 250 OP 636: Performed by: STUDENT IN AN ORGANIZED HEALTH CARE EDUCATION/TRAINING PROGRAM

## 2022-05-30 PROCEDURE — 85027 COMPLETE CBC AUTOMATED: CPT | Performed by: STUDENT IN AN ORGANIZED HEALTH CARE EDUCATION/TRAINING PROGRAM

## 2022-05-30 PROCEDURE — 82310 ASSAY OF CALCIUM: CPT | Performed by: STUDENT IN AN ORGANIZED HEALTH CARE EDUCATION/TRAINING PROGRAM

## 2022-05-30 PROCEDURE — 36415 COLL VENOUS BLD VENIPUNCTURE: CPT | Performed by: STUDENT IN AN ORGANIZED HEALTH CARE EDUCATION/TRAINING PROGRAM

## 2022-05-30 PROCEDURE — 250N000013 HC RX MED GY IP 250 OP 250 PS 637: Performed by: STUDENT IN AN ORGANIZED HEALTH CARE EDUCATION/TRAINING PROGRAM

## 2022-05-30 PROCEDURE — 250N000013 HC RX MED GY IP 250 OP 250 PS 637

## 2022-05-30 RX ADMIN — ATORVASTATIN CALCIUM 40 MG: 40 TABLET, FILM COATED ORAL at 08:02

## 2022-05-30 RX ADMIN — ENOXAPARIN SODIUM 40 MG: 40 INJECTION SUBCUTANEOUS at 06:21

## 2022-05-30 RX ADMIN — LEVETIRACETAM 500 MG: 500 TABLET, FILM COATED ORAL at 08:02

## 2022-05-30 RX ADMIN — DULOXETINE HYDROCHLORIDE 20 MG: 20 CAPSULE, DELAYED RELEASE ORAL at 08:02

## 2022-05-30 RX ADMIN — PREGABALIN 75 MG: 75 CAPSULE ORAL at 08:02

## 2022-05-30 RX ADMIN — NYSTATIN: 100000 CREAM TOPICAL at 12:58

## 2022-05-30 RX ADMIN — ASPIRIN 81 MG: 81 TABLET, CHEWABLE ORAL at 08:02

## 2022-05-30 RX ADMIN — HYDRALAZINE HYDROCHLORIDE 25 MG: 25 TABLET, FILM COATED ORAL at 14:15

## 2022-05-30 RX ADMIN — INSULIN LISPRO 1 UNITS: 100 INJECTION, SOLUTION INTRAVENOUS; SUBCUTANEOUS at 11:52

## 2022-05-30 RX ADMIN — HYDRALAZINE HYDROCHLORIDE 25 MG: 25 TABLET, FILM COATED ORAL at 08:02

## 2022-05-30 RX ADMIN — LORATADINE 10 MG: 10 TABLET ORAL at 08:02

## 2022-05-30 RX ADMIN — INSULIN LISPRO 1 UNITS: 100 INJECTION, SOLUTION INTRAVENOUS; SUBCUTANEOUS at 08:03

## 2022-05-30 RX ADMIN — LEVOTHYROXINE SODIUM 88 MCG: 88 TABLET ORAL at 08:02

## 2022-05-30 RX ADMIN — CARVEDILOL 6.25 MG: 6.25 TABLET, FILM COATED ORAL at 08:02

## 2022-05-30 ASSESSMENT — ACTIVITIES OF DAILY LIVING (ADL)
ADLS_ACUITY_SCORE: 37

## 2022-05-30 NOTE — DISCHARGE SUMMARY
[facility]  Discharge Summary - Medicine & Pediatrics       Date of Admission:  5/29/2022  Date of Discharge:  5/30/2022  Discharging Provider: Omkar Avila  Discharge Service: Medicine Service, INDER TEAM 1    Discharge Diagnoses   EVON on CKD  Hyperglycemia  Incidental COVID    Follow-ups Needed After Discharge   Follow-up Appointments     Adult [facility] Follow-up and recommended labs and tests      Primary Care clinical follow up within 1 week with CBC and BMP. And to   follow up if any urinary symptoms.    Appointments on [place] and/or [facility] (with [facility] or [facility]   provider or service). Call [phone] if you haven't heard regarding   these appointments within 7 days of discharge.             Unresulted Labs Ordered in the Past 30 Days of this Admission     Date and Time Order Name Status Description    5/29/2022  2:42 AM Urine Culture Preliminary           Discharge Disposition   Discharged to daughter's home in her care  Condition at discharge: Stable      Hospital Course   Isabell Matthews is a 61 year old female with PMHx of T2DM, CVA, vascular dementia, neuropathy, HLD, and CKD (baseline Cr ~1.1-1.3) admitted from the ED for management of EVON and hyperglycemia    # Hyperglycemia 2/2 T2DM  Presented with glucose of 592. Most likely due to daughter, who is patient's caregiver, not being able to adequately care due to having COVID herself. While admitted she received insulin while in the ED and was started back on her PTA glargine and PTA sliding scale and her glucose improved significantly. On discharge,   - increase lantus to 20 units daily   - C/w PTA sliding scale insulin regimen (high resistance)  - follow up with primary care provider      # EVON on CKD  Presented with Cr of 1.71. Baseline Cr ~1.1-1.3. Suspect 2/2 hypovolemia due to hyperglycemia. She was given 1L of NS IV while in the ED and encouraged good oral hydration while inpatient. With  the management of her hyperglycemia and diabetes with adeuqate hydration it appeared that her kidney function improved back to baseline. On discharge,   - follow up with primary in 1-2 weeks to ensure kidney function stays the same     # COVID19+ infection  Found to be COVID+ on admission, though suspected from daughter who has had COVID for the past week. No fever/cough/hypoxia. Fully vaccinated per daughter. Will monitor for progression given risk factors of T2DM and age. No antivirals or dexamethasone needed during hospitalization.     #vascular dementia  #concern for placement  Per last hospitalization the PCP reached out to primary team to see if she should be placed in memory care if she were to be hospitalized again. Discussed with daughter who did not want patient to go to a memory care unit and wanted to bring her back home to take care of her further.     Consultations This Hospital Stay   SOCIAL WORK IP CONSULT    Code Status   Full Code       The patient was discussed with Dr. Christiana Avila MD  [facility]  Internal Medicine PGY-1  [facility] EMERGENCY DEPARTMENT  [address] [address]  Phone: [phone]  ______________________________________________________________________    Physical Exam   Vital Signs: Temp: 98.1  F (36.7  C) Temp src: Oral BP: 123/66 Pulse: 89   Resp: 18 SpO2: 97 % O2 Device: None (Room air)    Weight: 140 lbs 0 oz  Constitutional: NAD; laying in bed comfortably; minimally interactive  HEENT: EOMI; able to track  Chest: CTAB  Cardio: S1/S2 present; RRR  Abdomen: soft, non-tender, non-distended  Extremities: wwp; no edema  Neuro: no-verbal; appears to answer with simple nods and moving R hand, occasionally has one word answers  Psych: cooperative affect      Primary Care Physician   Bony Castaneda    Discharge Orders      Reason for your hospital stay    You were admitted to the hospital with an acute kidney injury from  dehydration and elevated blood sugars. It is very important to monitor blood sugars in the setting of dehydration and poor food intake, as with diabetes, blood sugars can remain elevated. Encouraging good nutrition is also important to ensure kidneys are getting enough nutrients.    Please continue to stay hydrated at home and follow up with primary care doctor within 1 week to discuss nutrition plan and recheck labs.     Activity    Your activity upon discharge: activity as tolerated     Adult [facility] Follow-up and recommended labs and tests    Primary Care clinical follow up within 1 week with CBC and BMP. And to follow up if any urinary symptoms.    Appointments on [place] and/or [facility] (with [facility] or [facility] provider or service). Call [phone] if you haven't heard regarding these appointments within 7 days of discharge.     Diet    Follow this diet upon discharge: Orders Placed This Encounter      Combination Diet Regular Diet Adult; Moderate Consistent Carb (60 g CHO per Meal) Diet       Significant Results and Procedures   Most Recent 3 CBC's:Recent Labs   Lab Test 05/30/22  0646 05/29/22  0116 05/14/22  0828   WBC 4.3 4.3 4.8   HGB 12.1 11.6* 11.3*   MCV 92 94 93    175 238     Most Recent 3 BMP's:Recent Labs   Lab Test 05/30/22  1151 05/30/22  0758 05/30/22  0646 05/29/22  0227 05/29/22  0116 05/14/22  1217 05/14/22  0828   NA  --   --  142  --  143  --  138   POTASSIUM  --   --  3.6  --  5.0  --  4.6   CHLORIDE  --   --  111*  --  108  --  106   CO2  --   --  25  --  21  --  28   BUN  --   --  26  --  49*  --  28   CR  --   --  1.14*  --  1.71*  --  1.26*   ANIONGAP  --   --  6  --  14  --  4   VERONICA  --   --  9.3  --  9.7  --  9.2   * 155* 176*   < > 592*   < > 308*    < > = values in this interval not displayed.       Discharge Medications   Current Discharge Medication List      CONTINUE these medications which have CHANGED    Details   insulin glargine (LANTUS PEN) 100 UNIT/ML  pen Please inject 20 units at bedtime change of dose  Qty: 30 mL, Refills: 4    Comments: If Lantus is not covered by insurance, may substitute Basaglar or Semglee or other insulin glargine product per insurance preference at same dose and frequency.    Associated Diagnoses: History of insulin dependent diabetes mellitus; Type 1 diabetes mellitus with other circulatory complication (H)         CONTINUE these medications which have NOT CHANGED    Details   aspirin (ASA) 81 MG chewable tablet Take 1 tablet (81 mg) by mouth daily  Qty: 30 tablet, Refills: 0    Associated Diagnoses: Acute CVA (cerebrovascular accident) (H)      atorvastatin (LIPITOR) 40 MG tablet Take 1 tablet (40 mg) by mouth daily  Qty: 90 tablet, Refills: 3    Associated Diagnoses: Hyperlipidemia LDL goal <70      blood glucose (NO BRAND SPECIFIED) test strip Use to test blood sugar 4-5 times daily or as directed.  Qty: 400 strip, Refills: 3    Associated Diagnoses: Type 1 diabetes mellitus with other circulatory complication (H)      carvedilol (COREG) 6.25 MG tablet 1 tablet (6.25 mg) by Oral or Feeding Tube route 2 times daily (with meals)  Qty: 180 tablet, Refills: 3    Associated Diagnoses: Left-sided nontraumatic intracerebral hemorrhage, unspecified cerebral location (H); Benign essential hypertension      cyanocobalamin (VITAMIN B-12) 1000 MCG tablet Take 1 tablet (1,000 mcg) by mouth daily  Qty: 100 tablet, Refills: 3    Associated Diagnoses: Vitamin B12 deficiency (non anemic)      diclofenac (VOLTAREN) 1 % topical gel Apply 2 g topically 4 times daily as needed for moderate pain  Qty: 150 g, Refills: 1    Associated Diagnoses: Pain in joint, ankle and foot, right      doxazosin (CARDURA) 1 MG tablet Take 1 tablet (1 mg) by mouth At Bedtime  Qty: 90 tablet, Refills: 3    Associated Diagnoses: Left-sided nontraumatic intracerebral hemorrhage, unspecified cerebral location (H); Benign essential hypertension      DULoxetine (CYMBALTA) 20 MG  capsule Take 1 capsule (20 mg) by mouth daily  Qty: 90 capsule, Refills: 1    Associated Diagnoses: Fibromyalgia      fluconazole (DIFLUCAN) 100 MG tablet Take 1 tablet (100 mg) by mouth daily for 14 days  Qty: 14 tablet, Refills: 0    Associated Diagnoses: Candidal UTI (urinary tract infection)      !! insulin lispro (HUMALOG) 100 UNIT/ML vial Inject 1-10 Units Subcutaneous 3 times daily (before meals) Correction Scale - HIGH INSULIN RESISTANCE DOSING: Do Not give Correction Insulin if Pre-Meal BG less than 140. For Pre-Meal  - 164 give 1 unit. For Pre-Meal  - 189 give 2 units. For Pre-Meal  - 214 give 3 units. For Pre-Meal  - 239 give 4 units. For Pre-Meal  - 264 give 5 units. For Pre-Meal  - 289 give 6 units. For Pre-Meal  - 314 give 7 units. For Pre-Meal  - 339 give 8 units. For Pre-Meal  - 364 give 9 units. For Pre-Meal BG greater than or equal to 365 give 10 units.To be given with prandial insulin, and based on pre-meal blood glucose. Notify provider if glucose greater than or equal to 350 mg/dL after administration of correction dose. If given at mealtime, administer within 30 minutes of start of meal.  Qty: 9 mL, Refills: 0    Associated Diagnoses: Type 2 diabetes mellitus with diabetic polyneuropathy, with long-term current use of insulin (H)      !! insulin lispro (HUMALOG) 100 UNIT/ML vial Inject 1-7 Units Subcutaneous At Bedtime HIGH INSULIN RESISTANCE DOSING: Do Not give Bedtime Correction Insulin if BG less than 200. For  - 224 give 1 units. For  - 249 give 2 units. For  - 274 give 3 units. For  - 299 give 4 units. For  - 324 give 5 units. For  - 349 give 6 units. For BG greater than or equal to 350 give 7 units. Notify provider if glucose greater than or equal to 350 mg/dL after administration of correction dose. If given at mealtime, administer within 30 minutes of start of meal.  Qty: 2.1 mL, Refills: 0     Associated Diagnoses: Type 2 diabetes mellitus with diabetic polyneuropathy, with long-term current use of insulin (H)      insulin pen needle (31G X 8 MM) 31G X 8 MM miscellaneous Use 4 pen needles daily or as directed.  Qty: 300 each, Refills: 4    Associated Diagnoses: History of insulin dependent diabetes mellitus      levETIRAcetam (KEPPRA) 500 MG tablet Take 1 tablet (500 mg) by mouth 2 times daily  Qty: 186 tablet, Refills: 3    Associated Diagnoses: Seizures (H)      loratadine (CLARITIN) 10 mg tablet [LORATADINE (CLARITIN) 10 MG TABLET] Take 10 mg by mouth daily.      melatonin 10 mg Tab Take 10 mg by mouth nightly as needed      nystatin (MYCOSTATIN) 866233 UNIT/GM external cream Apply topically 2 times daily  Qty: 15 g, Refills: 0    Associated Diagnoses: Candidiasis of skin      pregabalin (LYRICA) 75 MG capsule TAKE 1 CAPSULE(75 MG) BY MOUTH TWICE DAILY  Qty: 60 capsule, Refills: 1    Associated Diagnoses: Neuropathy; Fibromyalgia      SYNTHROID 88 MCG tablet Take 1 tablet (88 mcg) by mouth daily  Qty: 90 tablet, Refills: 3    Associated Diagnoses: Acquired hypothyroidism      Vitamin D3 (CHOLECALCIFEROL) 25 mcg (1000 units) tablet Take 1 tablet (25 mcg) by mouth daily  Qty: 100 tablet, Refills: 3    Associated Diagnoses: Benign essential hypertension       !! - Potential duplicate medications found. Please discuss with provider.        Allergies   Allergies   Allergen Reactions     Bee Pollen Headache     Pollen Extract Headache

## 2022-05-30 NOTE — PROGRESS NOTES
"Shift: 2722-4353    Pain: denies any pain  Neuro: alert and follows command but slow to respond, unable to assess full orientation - patient just only reply with \"yes, no, okay or nod\".  Cardiac: regular heart rate and rhythm; BP 150s to 140s systolic and 70s to 80s diastolic; no PRN BP meds given.  Respiratory: clear breath sounds in all lung fields; on room air with O2 sat 95-97%  Diet/Appetite:  Combination regular diet  /GI: incontinent bowel and bladder; able to urinate on her own; 2x urine output  LDA's: PIV  Activity: assistive of 1  Pertinent Labs/Lab Collection: CBC c PC and BMP - in process       VS: BP (!) 148/78   Pulse 89   Temp 98.2  F (36.8  C) (Oral)   Resp 20   Ht 1.651 m (5' 5\")   Wt 63.5 kg (140 lb)   SpO2 97%   BMI 23.30 kg/m      "

## 2022-05-30 NOTE — PROGRESS NOTES
"Pt was discharge home with daughter, wheeled out in wheelchair for ride with family. Discharge instructions given and demonstrated understanding.    Blood pressure 123/66, pulse 89, temperature 98.1  F (36.7  C), temperature source Oral, resp. rate 18, height 1.651 m (5' 5\"), weight 63.5 kg (140 lb), SpO2 97 %, not currently breastfeeding.    "

## 2022-05-31 ENCOUNTER — PATIENT OUTREACH (OUTPATIENT)
Dept: CARE COORDINATION | Facility: CLINIC | Age: 62
End: 2022-05-31
Payer: MEDICARE

## 2022-05-31 ENCOUNTER — MEDICAL CORRESPONDENCE (OUTPATIENT)
Dept: HEALTH INFORMATION MANAGEMENT | Facility: CLINIC | Age: 62
End: 2022-05-31
Payer: MEDICARE

## 2022-05-31 DIAGNOSIS — Z71.89 OTHER SPECIFIED COUNSELING: ICD-10-CM

## 2022-05-31 NOTE — PROGRESS NOTES
Clinic Care Coordination Contact  [facility]/Voicemail     Clinical Data: Care Coordinator Outreach - TCM      Outreach attempted x 1.  Left message on patient's voicemail, providing [facility]'s 24/7 scheduling and nurse triage phone number [phone] ([phone]) for questions/concerns and/or to schedule an appt with an [facility] provider, if they do not have a PCP.      Plan:  Care Coordinator will try to reach patient again in 1-2 business days.       Deann Villalpando RN  [facility], [facility]

## 2022-06-01 LAB
BACTERIA UR CULT: ABNORMAL

## 2022-06-01 NOTE — TELEPHONE ENCOUNTER
Clinic Care Coordination Contact    Background: Care Coordination referral placed from [facility] discharge report for reason of patient meeting criteria for a TCM outreach call by [facility] [place] team.    Assessment: Upon chart review, CCRC Team member will cancel/close the referral for TCM outreach due to reason below:    Patient has a follow up appointment with an appropriate provider today for hospital discharge    Plan: Care Coordination referral for TCM outreach canceled.    Jovita Hayes RN  [facility] [place], [facility]

## 2022-06-02 NOTE — UTILIZATION REVIEW
Admission Status; Secondary Review Determination    No action to be taken. Please contact me on my Email : [email or web address] if you have any questions.    As part of the [place] Utilization review plan, a self-audit is done on Medicare inpatient admission with less than 2 midnights stay. The 2014 IPPS Final Rule allows outpatient billing in the event that a hospital determines that an inpatient admission was not medically necessary under utilization review process.     (x) Outpatient status would be Appropriate- Short Stay- Post discharge review.    RATIONALE FOR DETERMINATION  Isabell Matthews is a 61 year old female with PMHx of T2DM, CVA, vascular dementia, neuropathy, HLD, and CKD (baseline Cr ~1.1-1.3) admitted from the ED for management of EVON and hyperglycemia.  COVID - Asymptomatic  EVON and Hyperglycemia resolved with adequate hydration and increase in the dose of Lantus and high intensity sliding scale insulin     Patient was admitted and discharge after one night stay. Record was sent by  for a PA review. Based on the  severity of illness, intensity of service provided, expected LOS and risk for adverse outcome make the care appropriate for further outpatient/observation; however, doesn't meet criteria for hospital inpatient admission.       The information on this document is developed by the utilization review team in order for the business office to ensure compliance.  This only denotes the appropriateness of proper admission status and does not reflect the quality of care rendered.       The definitions of Inpatient Status and Observation Status used in making the determination above are those provided in the CMS Coverage Manual, Chapter 1 and Chapter 6, section 70.4.     Please cont me if you want to discuss further about this admission episode.      Lala Reese MD, FACP, SHAVONNE  Medical Director - Utilization management  Staff Hospitalist  [facility]    Pager: [phone]

## 2022-06-13 ENCOUNTER — MEDICAL CORRESPONDENCE (OUTPATIENT)
Dept: HEALTH INFORMATION MANAGEMENT | Facility: CLINIC | Age: 62
End: 2022-06-13
Payer: MEDICARE

## 2022-06-14 ENCOUNTER — TELEPHONE (OUTPATIENT)
Dept: FAMILY MEDICINE | Facility: CLINIC | Age: 62
End: 2022-06-14
Payer: MEDICARE

## 2022-06-14 NOTE — TELEPHONE ENCOUNTER
[facility] Call Center    Phone Message    May a detailed message be left on voicemail: yes     Reason for Call: Other: Bela called, concerning pt's low bp of 79/66, after drinking fluids, went up to 94/74. Bela requesting call back to discuss plans.      Action Taken: Message routed to:  [facility]    Travel Screening: Not Applicable

## 2022-06-14 NOTE — TELEPHONE ENCOUNTER
RN called Bela with home care, and reviewed patient's symptoms.  RN let her know that schedulers are trying to reach patient's daughter to make a video appt and an in person appt with Dr. Castaneda in 1-2 months.  RN let Bela know that Dr. Castaneda has virtual appts on Thursdays, and in person appts on [place] and Weds, and that PAOs are OK to use.      Dorina Argueta RN on 6/14/2022 at 2:34 PM

## 2022-06-14 NOTE — TELEPHONE ENCOUNTER
Called Pete and left a secure VM.  Gave verbal orders per Dr. Castaneda for Order(s): Home Care Orders: Physical Therapy (PT): 2x a wk for 5 wks, 1x a wk for 1 wk, Occupational Therapy (OT): 2x a wk fo 3 wks, Skilled Nursing:  and Other: Speech Therapy: 1x a week for 1 wk, 2x a wk for 2 wks and 1x a wk for 1 wk, Skilled Nursinx a wk for 1 wk, 1x a wk for 2 wks        Edwar Andrews CMA (KEN) at 3:48 PM on 2022

## 2022-06-14 NOTE — TELEPHONE ENCOUNTER
RAINAM w/ pt's daughter for pt to schedule virtual w/ pcp on Thurs 6/16 as well as a 1-2mo follow up in person with Dr. Castaneda

## 2022-06-14 NOTE — TELEPHONE ENCOUNTER
----- Message from Dorina Argueta RN sent at 6/14/2022  7:27 AM CDT -----  Regarding: RE: Covid  Please help patient schedule either a virtual appt on Thurs 6/16 with Dr. Castaneda and an in person appt with Dr. Castaneda in 1-2 months.  I tried calling patient's daughter, Maria Guadalupe, yesterday but voicemail was full.    Thanks,  Dorina  ----- Message -----  From: Bony Castaneda MD  Sent: 6/13/2022   5:51 AM CDT  To: Dorina Argueta RN  Subject: Covid                                            I noted she had covid positive 5/29 call to determine is they want to reschedule to virtual this week on Thursday. Will need an in person visit in 1-2 months.  Best wishes,  Bony Castaneda MD

## 2022-06-14 NOTE — TELEPHONE ENCOUNTER
[facility] Call Center    Phone Message    May a detailed message be left on voicemail: yes     Reason for Call: Order(s): Home Care Orders: Physical Therapy (PT): 2x a wk for 5 wks, 1x a wk for 1 wk, Occupational Therapy (OT): 2x a wk fo 3 wks, Skilled Nursing:  and Other: Speech Therapy: 1x a week for 1 wk, 2x a wk for 2 wks and 1x a wk for 1 wk, Skilled Nursinx a wk for 1 wk, 1x a wk for 2 wks    Action Taken: Message routed to:  [facility]    Travel Screening: Not Applicable

## 2022-06-15 ENCOUNTER — MEDICAL CORRESPONDENCE (OUTPATIENT)
Dept: HEALTH INFORMATION MANAGEMENT | Facility: CLINIC | Age: 62
End: 2022-06-15
Payer: MEDICARE

## 2022-06-20 ENCOUNTER — MEDICAL CORRESPONDENCE (OUTPATIENT)
Dept: HEALTH INFORMATION MANAGEMENT | Facility: CLINIC | Age: 62
End: 2022-06-20
Payer: MEDICARE

## 2022-06-27 ENCOUNTER — TELEPHONE (OUTPATIENT)
Dept: NEUROLOGY | Facility: CLINIC | Age: 62
End: 2022-06-27

## 2022-06-27 ENCOUNTER — MEDICAL CORRESPONDENCE (OUTPATIENT)
Dept: HEALTH INFORMATION MANAGEMENT | Facility: CLINIC | Age: 62
End: 2022-06-27

## 2022-06-27 ENCOUNTER — TELEPHONE (OUTPATIENT)
Dept: FAMILY MEDICINE | Facility: CLINIC | Age: 62
End: 2022-06-27

## 2022-06-27 NOTE — TELEPHONE ENCOUNTER
Called and left detailed VM for Nehal, regarding virtual appointment scheduled for this afternoon, Unfortunately, due to urgent provider schedule changes, the appointment needed to be cancelled, upon review, patient is scheduled for stroke follow up with Dr. Soto 8/15/22 and does not require virtual appointment to be rescheduled, patient should keep Stroke follow up as scheduled.     JAZMYNE LOPEZ, CMA

## 2022-06-27 NOTE — TELEPHONE ENCOUNTER
RN left voice message for nurse manager at [facility] with Dr. Castaneda's order.  This order will also be faxed to [facility].    Dorina Argueta RN on 6/27/2022 at 1:12 PM

## 2022-06-27 NOTE — TELEPHONE ENCOUNTER
June 27, 2022  I reviewed chart due to report of 6/14 of low BP  Isabell Matthews requires primary care visit in order to continue home care orders.  Please call to schedule.   Call home care to hold dose of carvedilol if BP less than 90/50 and call provider with update of BP once weekly  Best wishes,  Bony Castaneda MD

## 2022-06-29 ENCOUNTER — MEDICAL CORRESPONDENCE (OUTPATIENT)
Dept: HEALTH INFORMATION MANAGEMENT | Facility: CLINIC | Age: 62
End: 2022-06-29

## 2022-07-05 NOTE — TELEPHONE ENCOUNTER
Left detailed VM, confirming upcoming Neurology visit, asked to call with any questions. Per Desi Soares, no need to reschedule Stroke BULMARO visit.  JAZMYNE LOPEZ, CMA

## 2022-07-06 ENCOUNTER — MEDICAL CORRESPONDENCE (OUTPATIENT)
Dept: HEALTH INFORMATION MANAGEMENT | Facility: CLINIC | Age: 62
End: 2022-07-06

## 2022-07-06 DIAGNOSIS — M79.7 FIBROMYALGIA: ICD-10-CM

## 2022-07-06 DIAGNOSIS — G62.9 NEUROPATHY: ICD-10-CM

## 2022-07-06 RX ORDER — PREGABALIN 75 MG/1
CAPSULE ORAL
Qty: 30 CAPSULE | Refills: 0 | Status: SHIPPED | OUTPATIENT
Start: 2022-07-06 | End: 2022-10-31

## 2022-07-06 NOTE — TELEPHONE ENCOUNTER
Isabell was seen today for medication request.    Diagnoses and all orders for this visit:    Neuropathy    Fibromyalgia  -     pregabalin (LYRICA) 75 MG capsule; TAKE 1 CAPSULE(75 MG) BY MOUTH TWICE DAILY    I supplied  2 week supply as appt is required several messages prior.  Best wishes,  Bony Castaneda MD

## 2022-07-06 NOTE — TELEPHONE ENCOUNTER
[facility] Call Center    Phone Message    May a detailed message be left on voicemail: yes     Reason for Call: Medication Refill Request    Has the patient contacted the pharmacy for the refill? Yes   Name of medication being requested: pregabalin (LYRICA) 75 MG capsule  Provider who prescribed the medication: Dr Castaneda   Pharmacy: [id number] - [place], [place] - [address] AT [facility]  Date medication is needed: ASAP, pt completely out as of today         Action Taken: Message routed to:  [facility]    Travel Screening: Not Applicable

## 2022-07-07 ENCOUNTER — MEDICAL CORRESPONDENCE (OUTPATIENT)
Dept: FAMILY MEDICINE | Facility: CLINIC | Age: 62
End: 2022-07-07

## 2022-07-11 ENCOUNTER — MEDICAL CORRESPONDENCE (OUTPATIENT)
Dept: HEALTH INFORMATION MANAGEMENT | Facility: CLINIC | Age: 62
End: 2022-07-11

## 2022-07-12 ENCOUNTER — TELEPHONE (OUTPATIENT)
Dept: FAMILY MEDICINE | Facility: CLINIC | Age: 62
End: 2022-07-12

## 2022-07-12 ENCOUNTER — OFFICE VISIT (OUTPATIENT)
Dept: INTERNAL MEDICINE | Facility: CLINIC | Age: 62
End: 2022-07-12
Payer: MEDICARE

## 2022-07-12 VITALS — DIASTOLIC BLOOD PRESSURE: 82 MMHG | HEART RATE: 90 BPM | SYSTOLIC BLOOD PRESSURE: 110 MMHG | OXYGEN SATURATION: 99 %

## 2022-07-12 DIAGNOSIS — R00.0 TACHYCARDIA: ICD-10-CM

## 2022-07-12 DIAGNOSIS — G93.40 ENCEPHALOPATHY: Primary | ICD-10-CM

## 2022-07-12 PROCEDURE — 99214 OFFICE O/P EST MOD 30 MIN: CPT | Mod: GC

## 2022-07-12 NOTE — TELEPHONE ENCOUNTER
[facility] Call [facility]    Phone Message    May a detailed message be left on voicemail: yes     Reason for Call: Other: Per call from [employer], reporting a fall, happened on 6/20. Per Nicol, per daughter patient attempted to stand without assistant and fell on butt, bruise on right side. Please reach out to [employer] if any questions.      Action Taken: Message routed to:  [facility]    Travel Screening: Not Applicable

## 2022-07-12 NOTE — TELEPHONE ENCOUNTER
LVM w/ pcc number for pt to schedule appt in residents clinic on Thurs 7/14 per Dr. Divine Morrison - any resident okay & okayed to use any open slot

## 2022-07-12 NOTE — PROGRESS NOTES
[facility]     Patient Name: Isabell Matthews  YOB: 1960  MRN: [id number]    Date of Service: July 12, 2022  Chief Complaint: per daughter, Vishal - hospital, bp fu         HISTORY OF PRESENT ILLNESS:    Isabell Matthews is a 61 year old female with PMHx of T2DM, CVA, vascular dementia, HLD, and CKD (baseline Cr ~1.1-1.3) who returns to clinic for hospital follow-up. The history was collected from her daughter, Vishal.     She was recently admitted from 5/29-5/30 for hyperglycemia in the setting of being unable to take her medications as her caregiver was ill with COVID (and then the patient was found to have an asymptomatic COVID infection, not requiring treatment). Of note, there was concern about her living environment and possible discussion of moving to memory care but family declined at this time.     She is following up for this hospital stay. Since then, she initially had issues of lower blood pressures -- at  Least one documented of 79/66 that improved to 94/74 after drinking fluids. At this time, they discontinued her doxazosin and nifedipine and only continued on carvedilol. She has also had intermittent tachycardia since this time, but overall she thinks it has gotten better. They have PT/OT over multiple times per week who take vitals at each visit.     Vishal has also noticed that Isabell is less herself over the past ~5 days. Vishal primarilly notes that her mom is even less interactive than normal and is slower to respond. She hasn't noticed fevers or focal infectious complaints -- she is incontinent at baseline. Denies recent changes in medication apart from the blood pressure medications 1 month ago. Denies recent falls (but has history of falls).      Medications and allergies reviewed by me today.          PAST MEDICAL HISTORY:     Past Medical History:   Diagnosis Date     Chronic pain      Coronary atherosclerosis 6/14/2016     Essential hypertension      Ex-cigarette smoker      quit 7/2018 over 35 years     Ex-cigarette smoker      Hyperlipidemia      Hypothyroid      Seizures (H)      Stroke (H)      Vascular dementia (H)             REVIEW OF SYSTEMS:     10 point ROS was negative except as noted in HPI         HOME MEDICATIONS:     Current Outpatient Medications   Medication     aspirin (ASA) 81 MG chewable tablet     atorvastatin (LIPITOR) 40 MG tablet     blood glucose (NO BRAND SPECIFIED) test strip     carvedilol (COREG) 6.25 MG tablet     cyanocobalamin (VITAMIN B-12) 1000 MCG tablet     diclofenac (VOLTAREN) 1 % topical gel     doxazosin (CARDURA) 1 MG tablet     DULoxetine (CYMBALTA) 20 MG capsule     insulin glargine (LANTUS PEN) 100 UNIT/ML pen     insulin lispro (HUMALOG) 100 UNIT/ML vial     insulin lispro (HUMALOG) 100 UNIT/ML vial     insulin pen needle (31G X 8 MM) 31G X 8 MM miscellaneous     levETIRAcetam (KEPPRA) 500 MG tablet     loratadine (CLARITIN) 10 mg tablet     melatonin 10 mg Tab     pregabalin (LYRICA) 75 MG capsule     SYNTHROID 88 MCG tablet     Vitamin D3 (CHOLECALCIFEROL) 25 mcg (1000 units) tablet     No current facility-administered medications for this visit.            PHYSICAL EXAM:   Vitals: /82 (BP Location: Right arm, Patient Position: Sitting, Cuff Size: Adult Regular)   Pulse 90   SpO2 99%      Wt Readings from Last 4 Encounters:   05/29/22 63.5 kg (140 lb)   05/14/22 63.9 kg (140 lb 14 oz)   04/07/22 63.3 kg (139 lb 8.8 oz)   10/25/21 72.6 kg (160 lb)       GENERAL: Alert, not very interactive. NAD. Non-toxic in appearance.  HEENT: NCAT, EOMI  LUNGS: clear to auscultation bilaterally, no wheezing  HEART: regular rate and rhythm, normal S1 and S2, no murmur appreciated  ABDOMEN: Soft, nontender, nondistended  EXTREMITIES: No LE edema bilaterally  SKIN: Warm and dry, no jaundice or acute rashes  NEURO: Awake but not very interactive. Able to follow simple commands. Able to move all 4 limbs when asked.   PSYCH: Not very interactive.  Will respond with 1 word answers and doesn't always answer questions. Daughter says she is not interactive at baseline but just slower to interact.            DATA:     Laboratory Data & Imaging: no recent relevant labs/imaging          ASSESSMENT & PLAN:     Isabell was seen today for hospital f/u & new slower cognition.    Diagnoses and all orders for this visit:    Encephalopathy  Charli (daughter) reports worsening cognition over the past 5 days with slower response times and mobility. Hasn't noticed new infectious concerns in terms of fevers, but Maria Guadalupe was worried about a possible UTI at the suggestion of a HH RN. She is incontinent at baseline so Maria Guadalupe was unsure if she was having new symptoms and the patient is minimally verbal. Will get CBC and UA. Considered metabolic derangements as an etiology, will get CMP. No reported recent falls, not on anticoagulation to suggest head bleed. Brief neuro exam (difficult to complete given baseline deficits, in wheelchair) reassuring, moving all 4 limbs and following basic commands reassuring against acute neuro finding. Considered medication-induced cognitive changes but no recent changes in centrally medications that would explain this. Unfortunately, was running late this afternoon and Maria Guadalupe/Isabell had another appointment so were not able to complete labs at the time of appointment but will return to get labs done afterwards (and Maria Guadalupe reports will have to get UA at home d/t incontinence). Vitally stable and non-toxic in appearance so no indication to go to the ED at this time but reviewed with Maria Guadalupe there is low threshold to go if her mental status gets worse, she spikes a fever, or has general worsening symptoms. Additionally, we will have close f/u in resident clinic on Thursday pending results and if this improves.   -     CBC with platelets; Future  -     Comprehensive metabolic panel; Future  -     TSH with free T4 reflex; Future  -     UA with Micro reflex to Culture;  Future  - Return to resident clinic on Thursday if symptoms have not improved, or cautioned to go to the ED if worsening symptoms     Tachycardia  Intermittently tachycardic, although reportedly is overall improving. With mental status changes, will check TSH.   -     TSH with free T4 reflex; Future    Return to clinic: Thursday in resident clinic for f/u, especially if changes in mental status    Patient care plan discussed with attending physician, Dr. Solorio, who agreed with above.    Divine Morrison MD  Internal Medicine, PGY-2

## 2022-07-12 NOTE — PROGRESS NOTES
Hr>100 A FEW TIMES   Blood pressure as low as 77 on 6/14   After talking with Dr. Castaneda --- stopped the nifedipine and doxazosin   Has been off the past 5 days   No fevers; more slow & disconnected   Lost weight -- unsure how much over what period of time (guesses 10 lbs over 6 months)   BGs have been all over the place  No falls recently     TSH  CBC  CMP

## 2022-07-12 NOTE — TELEPHONE ENCOUNTER
----- Message from Divine Morrison MD sent at 7/12/2022  3:38 PM CDT -----  Hi,    Would someone be able to call this patient's daughter, Vishal (info in facesheet), to schedule her for a return visit on 7/14 in the [facility]ident [facility] (any provider) for follow-up from today's visit? They had to leave early so we were unable to schedule with the patient.    Thank you!  TH

## 2022-07-13 ENCOUNTER — MEDICAL CORRESPONDENCE (OUTPATIENT)
Dept: HEALTH INFORMATION MANAGEMENT | Facility: CLINIC | Age: 62
End: 2022-07-13

## 2022-07-13 NOTE — TELEPHONE ENCOUNTER
July 13, 2022  I have requested multiple times for follow visit with me. This needs to occur in order to have home cares. A visit has not yet been scheduled.    Bony Castaneda MD

## 2022-07-13 NOTE — TELEPHONE ENCOUNTER
"RN left detailed message for patient's daughter, Maria Guadalupe, letting her know that either a video or in person appt needs to be made ASAP with Dr. Castaneda and RN gave soonest appt times for 7/14 or 7/18 with Dr. Castaneda.  RN left [facility] scheduling number and asked for appt to be made urgently for continuing care and labs, as patient has had numerous no-shows with Dr. Castaneda and a visit yesterday where patient left appt early and could not do labs.    Note from visit on 7/12/22 with Dr. Morrison:    \"Maria Guadalupe/Isabell had another appointment so were not able to complete labs at the time of appointment but will return to get labs done afterwards (and Maria Guadalupe reports will have to get UA at home d/t incontinence). Vitally stable and non-toxic in appearance so no indication to go to the ED at this time but reviewed with Maria Guadalupe there is low threshold to go if her mental status gets worse, she spikes a fever, or has general worsening symptoms. Additionally, we will have close f/u in resident clinic on Thursday pending results and if this improves.   -     CBC with platelets; Future  -     Comprehensive metabolic panel; Future  -     TSH with free T4 reflex; Future  -     UA with Micro reflex to Culture; Future  -     Return to resident clinic on Thursday if symptoms have not improved, or cautioned to go to the ED if worsening symptoms \"    Dorina Argueta RN on 7/13/2022 at 8:37 AM    "

## 2022-07-13 NOTE — TELEPHONE ENCOUNTER
LVM w/ [facility] number for pt to schedule virtual or inperson w/ pcp - offered virtuals for tomorrow 7/14 AM

## 2022-07-19 ENCOUNTER — MEDICAL CORRESPONDENCE (OUTPATIENT)
Dept: INTERNAL MEDICINE | Facility: CLINIC | Age: 62
End: 2022-07-19

## 2022-07-20 ENCOUNTER — MEDICAL CORRESPONDENCE (OUTPATIENT)
Dept: HEALTH INFORMATION MANAGEMENT | Facility: CLINIC | Age: 62
End: 2022-07-20

## 2022-07-25 ENCOUNTER — MEDICAL CORRESPONDENCE (OUTPATIENT)
Dept: HEALTH INFORMATION MANAGEMENT | Facility: CLINIC | Age: 62
End: 2022-07-25

## 2022-07-25 DIAGNOSIS — E03.9 ACQUIRED HYPOTHYROIDISM: ICD-10-CM

## 2022-07-25 DIAGNOSIS — I63.9 ACUTE CVA (CEREBROVASCULAR ACCIDENT) (H): ICD-10-CM

## 2022-07-26 ENCOUNTER — LAB (OUTPATIENT)
Dept: LAB | Facility: CLINIC | Age: 62
End: 2022-07-26
Payer: MEDICARE

## 2022-07-26 DIAGNOSIS — G93.40 ENCEPHALOPATHY: ICD-10-CM

## 2022-07-26 DIAGNOSIS — R30.0 DYSURIA: ICD-10-CM

## 2022-07-26 DIAGNOSIS — R00.0 TACHYCARDIA: ICD-10-CM

## 2022-07-26 LAB
ALBUMIN SERPL-MCNC: 3.3 G/DL (ref 3.4–5)
ALP SERPL-CCNC: 168 U/L (ref 40–150)
ALT SERPL W P-5'-P-CCNC: 25 U/L (ref 0–50)
ANION GAP SERPL CALCULATED.3IONS-SCNC: 1 MMOL/L (ref 3–14)
AST SERPL W P-5'-P-CCNC: 24 U/L (ref 0–45)
BILIRUB SERPL-MCNC: 0.3 MG/DL (ref 0.2–1.3)
BUN SERPL-MCNC: 32 MG/DL (ref 7–30)
CALCIUM SERPL-MCNC: 9.4 MG/DL (ref 8.5–10.1)
CHLORIDE BLD-SCNC: 101 MMOL/L (ref 94–109)
CO2 SERPL-SCNC: 26 MMOL/L (ref 20–32)
CREAT SERPL-MCNC: 1.13 MG/DL (ref 0.52–1.04)
ERYTHROCYTE [DISTWIDTH] IN BLOOD BY AUTOMATED COUNT: 14.1 % (ref 10–15)
GFR SERPL CREATININE-BSD FRML MDRD: 54 ML/MIN/1.73M2
GLUCOSE BLD-MCNC: 418 MG/DL (ref 70–99)
HCT VFR BLD AUTO: 38.1 % (ref 35–47)
HGB BLD-MCNC: 12.1 G/DL (ref 11.7–15.7)
MCH RBC QN AUTO: 28.2 PG (ref 26.5–33)
MCHC RBC AUTO-ENTMCNC: 31.8 G/DL (ref 31.5–36.5)
MCV RBC AUTO: 89 FL (ref 78–100)
PLATELET # BLD AUTO: 225 10E3/UL (ref 150–450)
POTASSIUM BLD-SCNC: 4.3 MMOL/L (ref 3.4–5.3)
PROT SERPL-MCNC: 6.9 G/DL (ref 6.8–8.8)
RBC # BLD AUTO: 4.29 10E6/UL (ref 3.8–5.2)
SODIUM SERPL-SCNC: 128 MMOL/L (ref 133–144)
T4 FREE SERPL-MCNC: 1.14 NG/DL (ref 0.76–1.46)
TSH SERPL DL<=0.005 MIU/L-ACNC: 0.19 MU/L (ref 0.4–4)
WBC # BLD AUTO: 5.2 10E3/UL (ref 4–11)

## 2022-07-26 PROCEDURE — 80053 COMPREHEN METABOLIC PANEL: CPT | Performed by: PATHOLOGY

## 2022-07-26 PROCEDURE — 81001 URINALYSIS AUTO W/SCOPE: CPT | Performed by: PATHOLOGY

## 2022-07-26 PROCEDURE — 36415 COLL VENOUS BLD VENIPUNCTURE: CPT | Performed by: PATHOLOGY

## 2022-07-26 PROCEDURE — 84439 ASSAY OF FREE THYROXINE: CPT | Performed by: PATHOLOGY

## 2022-07-26 PROCEDURE — 87186 SC STD MICRODIL/AGAR DIL: CPT | Performed by: FAMILY MEDICINE

## 2022-07-26 PROCEDURE — 84443 ASSAY THYROID STIM HORMONE: CPT | Performed by: PATHOLOGY

## 2022-07-26 PROCEDURE — 85027 COMPLETE CBC AUTOMATED: CPT | Performed by: PATHOLOGY

## 2022-07-27 ENCOUNTER — LAB (OUTPATIENT)
Dept: LAB | Facility: CLINIC | Age: 62
End: 2022-07-27
Payer: MEDICARE

## 2022-07-27 ENCOUNTER — TELEPHONE (OUTPATIENT)
Dept: INTERNAL MEDICINE | Facility: CLINIC | Age: 62
End: 2022-07-27

## 2022-07-27 DIAGNOSIS — R30.0 DYSURIA: ICD-10-CM

## 2022-07-27 LAB
ALBUMIN UR-MCNC: NEGATIVE MG/DL
APPEARANCE UR: ABNORMAL
BACTERIA #/AREA URNS HPF: ABNORMAL /HPF
BILIRUB UR QL STRIP: NEGATIVE
COLOR UR AUTO: YELLOW
GLUCOSE UR STRIP-MCNC: >=2000 MG/DL
HGB UR QL STRIP: ABNORMAL
KETONES UR STRIP-MCNC: 40 MG/DL
LEUKOCYTE ESTERASE UR QL STRIP: ABNORMAL
MUCOUS THREADS #/AREA URNS LPF: PRESENT /LPF
NITRATE UR QL: POSITIVE
PH UR STRIP: 6 [PH] (ref 5–7)
RBC URINE: 2 /HPF
SP GR UR STRIP: 1.01 (ref 1–1.03)
SQUAMOUS EPITHELIAL: 3 /HPF
UROBILINOGEN UR STRIP-MCNC: NORMAL MG/DL
WBC CLUMPS #/AREA URNS HPF: PRESENT /HPF
WBC URINE: 93 /HPF

## 2022-07-27 RX ORDER — LEVOTHYROXINE SODIUM 88 MCG
88 TABLET ORAL DAILY
Qty: 90 TABLET | Refills: 3 | Status: SHIPPED | OUTPATIENT
Start: 2022-07-27 | End: 2022-11-01 | Stop reason: DRUGHIGH

## 2022-07-27 NOTE — TELEPHONE ENCOUNTER
Left brief voicemail for Maria Guadalupe (patient's daughter) asking her to call back the clinic to discuss lab results and schedule a follow-up appointment.    __________  Her lab results showed that her TSH was 0.19, showing she is on too high of a dose of synthroid (provided she is taking it as prescribed). Would you be able to call to confirm she is taking synthroid as prescribed  (88 mcg daily) and if so, can she reduce it to 75 mcg daily and then we will recheck her TSH in ~4-6 weeks?    Additionally, her sodium was a little low but this is in the setting of high blood sugars so it corrects to essentially normal.     Could she come into clinic ASAP for an appointment to check to see how things are going and recheck labs? I am out of the clinic for the next 4 weeks but Dr. Solorio said he is happy to see her Tuesday morning, or else she could schedule with Dr. Castaneda or another resident ASAP.    Pt's care was discussed with Chuck Solorio and Leonel.     Thanks!   Divine Morrison MD  PGY-2  Internal Medicine

## 2022-07-27 NOTE — TELEPHONE ENCOUNTER
SYNTHROID 0.088MG (88MCG)TABLETS      Last Written Prescription Date:  7/26/21  Last Fill Quantity: 90,   # refills: 3  Last Office Visit : 7/12/22  Future Office visit:  0    Routing refill request to provider for review/approval because:    Lab Test 07/26/22  1703   TSH 0.19*     Continue with same dose???

## 2022-07-28 ENCOUNTER — E-VISIT (OUTPATIENT)
Dept: FAMILY MEDICINE | Facility: CLINIC | Age: 62
End: 2022-07-28
Payer: MEDICARE

## 2022-07-28 DIAGNOSIS — N39.0 ACUTE UTI (URINARY TRACT INFECTION): Primary | ICD-10-CM

## 2022-07-28 PROCEDURE — 99421 OL DIG E/M SVC 5-10 MIN: CPT | Performed by: NURSE PRACTITIONER

## 2022-07-28 RX ORDER — NITROFURANTOIN 25; 75 MG/1; MG/1
100 CAPSULE ORAL 2 TIMES DAILY
Qty: 10 CAPSULE | Refills: 0 | Status: SHIPPED | OUTPATIENT
Start: 2022-07-28 | End: 2022-08-02

## 2022-07-28 NOTE — PATIENT INSTRUCTIONS
Dear Isabell Matthews    After reviewing your responses, I've been able to diagnose you with a urinary tract infection, which is a common infection of the bladder with bacteria.  This is not a sexually transmitted infection, though urinating immediately after intercourse can help prevent infections.  Drinking lots of fluids is also helpful to clear your current infection and prevent the next one.      I have sent a prescription for antibiotics to your pharmacy to treat this infection.    It is important that you take all of your prescribed medication even if your symptoms are improving after a few doses.  Taking all of your medicine helps prevent the symptoms from returning.     If your symptoms worsen, you develop pain in your back or stomach, develop fevers, or are not improving in 5 days, please contact your primary care provider for an appointment or visit any of our convenient Walk-in or Urgent Care Centers to be seen, which can be found on our website here.    Thanks again for choosing us as your health care partner,    PEGGY Moe CNP

## 2022-07-29 LAB — BACTERIA UR CULT: ABNORMAL

## 2022-07-29 NOTE — RESULT ENCOUNTER NOTE
Dear Isabell Matthews   If you are to continue as my patient an appointment is required with me face to face.  I see you have met with one of my colleagues and if you prefer to transfer your care it is acceptable.I see a urine was ordered under my name however I was not aware of this order, I see you had a virtual visit and were prescribed Nitrofurantoin which appears acceptable. Please update me on your plans so we may update the chart.  Best wishes,  Bony Castaneda MD

## 2022-08-01 ENCOUNTER — MEDICAL CORRESPONDENCE (OUTPATIENT)
Dept: FAMILY MEDICINE | Facility: CLINIC | Age: 62
End: 2022-08-01

## 2022-08-09 ENCOUNTER — PATIENT OUTREACH (OUTPATIENT)
Dept: INTERNAL MEDICINE | Facility: CLINIC | Age: 62
End: 2022-08-09

## 2022-08-09 NOTE — TELEPHONE ENCOUNTER
RN sent a letter to patient's home address with Dr. Castaneda's result note message from 7/29/22.  RN also left a voice message asking for patient's daughter, Maria Guadalupe, to call [facility] to schedule an in person appt for follow up with Dr. Castaneda in order to continue having Dr. Castaneda for PCP.    Dorina Argueta RN on 8/9/2022 at 9:03 AM

## 2022-08-16 ENCOUNTER — TELEPHONE (OUTPATIENT)
Dept: FAMILY MEDICINE | Facility: CLINIC | Age: 62
End: 2022-08-16

## 2022-08-16 NOTE — TELEPHONE ENCOUNTER
[facility] Call Center    Phone Message    May a detailed message be left on voicemail: yes     Reason for Call: Order(s): Home Care Orders: Other: Requesting to discharge speech therapy due to patient refusing.     Divine #: [phone]    Action Taken: Message routed to:  [facility]    Travel Screening: Not Applicable

## 2022-08-17 DIAGNOSIS — I61.9 LEFT-SIDED NONTRAUMATIC INTRACEREBRAL HEMORRHAGE, UNSPECIFIED CEREBRAL LOCATION (H): ICD-10-CM

## 2022-08-17 DIAGNOSIS — I10 BENIGN ESSENTIAL HYPERTENSION: ICD-10-CM

## 2022-08-17 RX ORDER — CARVEDILOL 6.25 MG/1
6.25 TABLET ORAL 2 TIMES DAILY WITH MEALS
Qty: 60 TABLET | Refills: 0 | Status: SHIPPED | OUTPATIENT
Start: 2022-08-17 | End: 2022-09-19

## 2022-08-17 NOTE — TELEPHONE ENCOUNTER
carvedilol (COREG) 6.25 MG tablet  Last Written Prescription Date:  7/27/2021  Last Fill Quantity: 180,   # refills: 3  Last Office Visit : 7/12/2022  Future Office visit:  None    Routing refill request to provider for review/approval because:  Please clarify orders.       Orders from pharmacy do not match the orders in the Pt care.   Please review       Nataly Hare RN  Central Triage Red Flags/Med Refills

## 2022-08-17 NOTE — TELEPHONE ENCOUNTER
Verbal orders given to Diivne from [facility], per Dr. Castaneda, for Home Care Orders: Other: Requesting to discharge speech therapy due to patient refusing. Veronica Li LPN 8/17/2022 9:31 AM

## 2022-08-18 NOTE — NURSING NOTE
August 18, 2022    At Toe Toe Po's request, I called and spoke with his father and PCA, Joey, to discuss the information we discussed during Toe Toe Po's visit today, including the recommendation for familial testing. A Angela  facilitated our discussion. 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 Joey shared that he is healthy and his children are healthy so they can't have this disease. I explained that someone can have this disease and not have symptoms yet. I also shared that men cannot pass this to their sons, so we wouldn't expect Joey to have X-ALD based on his son's history.     Joey repeated that the other children are healthy and asked if we could help Toe Toe Po with his symptoms instead. I shared that we will connect Toe Toe Nicholas to resources to see if we can offer treatment for his symptoms. However, I also shared that the best way to help someone with this disease is to know they have it before symptoms start since treatment is often more effective at this time and can be lifesaving. Joey agreed to a visit to consider testing for his three sons who are minors. I offered to discuss testing with Toe Toe Po's adult siblings as well and Joey said they would not be interested. We also discussed having Toe Toe Po's mother present to learn more about Toe Toe Po's diagnosis and consider testing. Joey shared she would not be interested. We made a plan to discuss testing further at the family's visit.    Joey shared they will need transportation and interpretation to facilitate the visit and I offered to work on connecting to internal resources to coordinate these services. samra was open to scheduling reaching out to schedule these visits. Additional questions or concerns were denied at this time.    Flaquita Callahan MS, MA, [facility]  Licensed, Certified Genetic  Chief Complaint   Patient presents with     Hospital F/U       MERRY Hopkins at 2:23 PM on 7/12/2022.    Counselor  Pediatric Blood & Marrow Transplant  [phone]  [email or web address]

## 2022-08-24 ENCOUNTER — MEDICAL CORRESPONDENCE (OUTPATIENT)
Dept: FAMILY MEDICINE | Facility: CLINIC | Age: 62
End: 2022-08-24

## 2022-08-26 DIAGNOSIS — E78.5 HYPERLIPIDEMIA LDL GOAL <70: Primary | ICD-10-CM

## 2022-08-30 DIAGNOSIS — M79.7 FIBROMYALGIA: ICD-10-CM

## 2022-08-30 DIAGNOSIS — G62.9 NEUROPATHY: ICD-10-CM

## 2022-08-30 RX ORDER — PREGABALIN 75 MG/1
CAPSULE ORAL
Qty: 30 CAPSULE | Refills: 0 | OUTPATIENT
Start: 2022-08-30

## 2022-08-30 NOTE — TELEPHONE ENCOUNTER
August 30, 2022  No show for multiple visits, no scheduled follow-up as previously instructed. Decline to fill Lyrica as appointment scheduling with [facility] or notification of new provider required.Please call Isabell Matthews  and her daughter.  Isabell was seen today for refill request.    Diagnoses and all orders for this visit:    Neuropathy  Fibromyalgia  I declined to fill Lyrica.    Bony Castaneda MD

## 2022-08-31 RX ORDER — ATORVASTATIN CALCIUM 40 MG/1
40 TABLET, FILM COATED ORAL DAILY
Qty: 90 TABLET | Refills: 3 | Status: SHIPPED | OUTPATIENT
Start: 2022-08-31 | End: 2022-08-31

## 2022-08-31 RX ORDER — ATORVASTATIN CALCIUM 40 MG/1
40 TABLET, FILM COATED ORAL DAILY
Qty: 90 TABLET | Refills: 0 | Status: SHIPPED | OUTPATIENT
Start: 2022-08-31 | End: 2022-10-31

## 2022-08-31 NOTE — TELEPHONE ENCOUNTER
Last Clinic Visit: [facility] 7/12/22  LIPITOR 40 MG #90, 3 refills  Thank-you, Marisela PARRISH RN Medication Refill Team

## 2022-09-06 DIAGNOSIS — I61.9 LEFT-SIDED NONTRAUMATIC INTRACEREBRAL HEMORRHAGE, UNSPECIFIED CEREBRAL LOCATION (H): ICD-10-CM

## 2022-09-06 DIAGNOSIS — I10 BENIGN ESSENTIAL HYPERTENSION: ICD-10-CM

## 2022-09-07 DIAGNOSIS — M79.7 FIBROMYALGIA: ICD-10-CM

## 2022-09-09 NOTE — TELEPHONE ENCOUNTER
DULoxetine (CYMBALTA) 20 MG capsule      Last Written Prescription Date:  9-7-21  Last Fill Quantity: 90,   # refills: 1  Last Office Visit : 7-12-22  Future Office visit:  none    Routing refill request to provider for review/approval because:  Gap in RF  Associated diagnosis not on protocol  Fibromyalgia [M79.7]

## 2022-09-09 NOTE — TELEPHONE ENCOUNTER
DOXAZOSIN 1MG TABLETS      Last Written Prescription Date:  7-26-21  Last Fill Quantity: 90,   # refills: 3  Last Office Visit : 7-12-22  Future Office visit:  none    Creatinine   Date Value Ref Range Status   07/26/2022 1.13 (H) 0.52 - 1.04 mg/dL Final   06/27/2021 1.48 (H) 0.52 - 1.04 mg/dL Final     Routing refill request to provider for review/approval because:  Med not on [facility] protocol  Abnormal Cr: reviewed by [facility] provider

## 2022-09-12 DIAGNOSIS — E10.59 TYPE 1 DIABETES MELLITUS WITH OTHER CIRCULATORY COMPLICATION (H): ICD-10-CM

## 2022-09-12 DIAGNOSIS — Z86.39 HISTORY OF INSULIN DEPENDENT DIABETES MELLITUS: ICD-10-CM

## 2022-09-12 RX ORDER — INSULIN GLARGINE 100 [IU]/ML
INJECTION, SOLUTION SUBCUTANEOUS
Qty: 30 ML | Refills: 0 | Status: SHIPPED | OUTPATIENT
Start: 2022-09-12 | End: 2022-10-31

## 2022-09-12 RX ORDER — DOXAZOSIN 1 MG/1
1 TABLET ORAL AT BEDTIME
Qty: 30 TABLET | Refills: 0 | Status: SHIPPED | OUTPATIENT
Start: 2022-09-12 | End: 2022-10-31

## 2022-09-12 RX ORDER — DULOXETIN HYDROCHLORIDE 20 MG/1
20 CAPSULE, DELAYED RELEASE ORAL DAILY
Qty: 90 CAPSULE | Refills: 2 | OUTPATIENT
Start: 2022-09-12

## 2022-09-12 NOTE — TELEPHONE ENCOUNTER
Attempted call patient to schedule follow up with PCP Dr Castaneda for follow up/ medications check at last visit was 07/12/22 with Resident. No answered. Unable to leave message as MB is full.    Patient missed appointment 5/9/22 and 6/13/22.

## 2022-09-12 NOTE — TELEPHONE ENCOUNTER
LANTUS SOLOSTAR PEN INJ 3ML      Requested: INJECT 26 UNITS SUBCUTANEOUS AT BEDTIME BUT IF SMALLER MEAL AT SUPPER REDUCE DOSE TO 20 UNITS.  Current: insulin glargine (LANTUS PEN) 100 UNIT/ML pen  Please inject 20 units at bedtime change of dose  Class: No Print Out  * ER encounter      Past medication Hx    insulin glargine (LANTUS PEN) 100 UNIT/ML pen (Discontinued)   30 mL 4 8/13/2021 5/30/2022 No  Sig: Please inject 18 units at bedtime change of dose  Patient taking differently: Inject 14-18 Units Subcutaneous At Bedtime Please inject 18 units at bedtime change of dose     Last Office Visit : 7-12-22  Future Office visit:  none    Routing refill request to provider for review/approval because:  Overdue A1c  Current med: no print out, ER encounter  Requested med directions do not match current   Previous medication hx: - different dosage than                         requested or current

## 2022-09-15 DIAGNOSIS — I10 BENIGN ESSENTIAL HYPERTENSION: Primary | ICD-10-CM

## 2022-09-15 DIAGNOSIS — I61.9 LEFT-SIDED NONTRAUMATIC INTRACEREBRAL HEMORRHAGE, UNSPECIFIED CEREBRAL LOCATION (H): ICD-10-CM

## 2022-09-19 RX ORDER — CARVEDILOL 6.25 MG/1
6.25 TABLET ORAL 2 TIMES DAILY WITH MEALS
Qty: 180 TABLET | Refills: 0 | Status: SHIPPED | OUTPATIENT
Start: 2022-09-19 | End: 2022-09-19

## 2022-09-19 RX ORDER — CARVEDILOL 6.25 MG/1
6.25 TABLET ORAL 2 TIMES DAILY WITH MEALS
Qty: 60 TABLET | Refills: 0 | Status: SHIPPED | OUTPATIENT
Start: 2022-09-19 | End: 2022-10-31 | Stop reason: DRUGHIGH

## 2022-09-19 NOTE — TELEPHONE ENCOUNTER
Patient was contacted 09/12/22 and 09/16/22. Both times mail box is full and is unable to leave message.     Appointment needed for follow up with PCP Dr Castaneda for follow up/ medications check at last visit was 07/12/22 with Resident.

## 2022-09-19 NOTE — TELEPHONE ENCOUNTER
I have declined to provide additional refills as needs to be seen.  One month supply approved.   Coreg 6.25 mg BID #[id number]  Bony Castaneda MD

## 2022-09-24 ENCOUNTER — HEALTH MAINTENANCE LETTER (OUTPATIENT)
Age: 62
End: 2022-09-24

## 2022-10-07 DIAGNOSIS — M79.7 FIBROMYALGIA: ICD-10-CM

## 2022-10-12 RX ORDER — DULOXETIN HYDROCHLORIDE 20 MG/1
20 CAPSULE, DELAYED RELEASE ORAL DAILY
Qty: 90 CAPSULE | Status: CANCELLED | OUTPATIENT
Start: 2022-10-12

## 2022-10-31 ENCOUNTER — LAB (OUTPATIENT)
Dept: LAB | Facility: CLINIC | Age: 62
End: 2022-10-31
Payer: MEDICARE

## 2022-10-31 ENCOUNTER — OFFICE VISIT (OUTPATIENT)
Dept: FAMILY MEDICINE | Facility: CLINIC | Age: 62
End: 2022-10-31
Payer: MEDICARE

## 2022-10-31 VITALS
TEMPERATURE: 97.7 F | RESPIRATION RATE: 20 BRPM | SYSTOLIC BLOOD PRESSURE: 132 MMHG | HEART RATE: 91 BPM | DIASTOLIC BLOOD PRESSURE: 84 MMHG | WEIGHT: 123.5 LBS | BODY MASS INDEX: 20.55 KG/M2 | OXYGEN SATURATION: 98 %

## 2022-10-31 DIAGNOSIS — I61.9 LEFT-SIDED NONTRAUMATIC INTRACEREBRAL HEMORRHAGE, UNSPECIFIED CEREBRAL LOCATION (H): ICD-10-CM

## 2022-10-31 DIAGNOSIS — M79.7 FIBROMYALGIA: ICD-10-CM

## 2022-10-31 DIAGNOSIS — K08.89 TOOTH PAIN: ICD-10-CM

## 2022-10-31 DIAGNOSIS — E03.9 ACQUIRED HYPOTHYROIDISM: ICD-10-CM

## 2022-10-31 DIAGNOSIS — Z23 HIGH PRIORITY FOR 2019-NCOV VACCINE: ICD-10-CM

## 2022-10-31 DIAGNOSIS — R56.9 SEIZURES (H): ICD-10-CM

## 2022-10-31 DIAGNOSIS — Z87.440 HX: UTI (URINARY TRACT INFECTION): ICD-10-CM

## 2022-10-31 DIAGNOSIS — E10.59 TYPE 1 DIABETES MELLITUS WITH OTHER CIRCULATORY COMPLICATION (H): ICD-10-CM

## 2022-10-31 DIAGNOSIS — E10.59 TYPE 1 DIABETES MELLITUS WITH OTHER CIRCULATORY COMPLICATION (H): Primary | ICD-10-CM

## 2022-10-31 DIAGNOSIS — N18.32 STAGE 3B CHRONIC KIDNEY DISEASE (H): ICD-10-CM

## 2022-10-31 DIAGNOSIS — I10 ESSENTIAL HYPERTENSION: ICD-10-CM

## 2022-10-31 DIAGNOSIS — Z23 ENCOUNTER FOR IMMUNIZATION: ICD-10-CM

## 2022-10-31 DIAGNOSIS — E78.5 HYPERLIPIDEMIA LDL GOAL <70: ICD-10-CM

## 2022-10-31 LAB
ALBUMIN SERPL BCG-MCNC: 3.8 G/DL (ref 3.5–5.2)
ALBUMIN UR-MCNC: ABNORMAL MG/DL
ALP SERPL-CCNC: 182 U/L (ref 35–104)
ALT SERPL W P-5'-P-CCNC: 28 U/L (ref 10–35)
ANION GAP SERPL CALCULATED.3IONS-SCNC: 9 MMOL/L (ref 7–15)
APPEARANCE UR: ABNORMAL
AST SERPL W P-5'-P-CCNC: 37 U/L (ref 10–35)
BACTERIA #/AREA URNS HPF: ABNORMAL /HPF
BASOPHILS # BLD AUTO: 0 10E3/UL (ref 0–0.2)
BASOPHILS NFR BLD AUTO: 1 %
BILIRUB SERPL-MCNC: <0.2 MG/DL
BILIRUB UR QL STRIP: NEGATIVE
BUN SERPL-MCNC: 35.7 MG/DL (ref 8–23)
CALCIUM SERPL-MCNC: 9.6 MG/DL (ref 8.8–10.2)
CHLORIDE SERPL-SCNC: 107 MMOL/L (ref 98–107)
COLOR UR AUTO: YELLOW
CREAT SERPL-MCNC: 1.44 MG/DL (ref 0.51–0.95)
CREAT UR-MCNC: 105 MG/DL
DEPRECATED HCO3 PLAS-SCNC: 26 MMOL/L (ref 22–29)
EOSINOPHIL # BLD AUTO: 0.1 10E3/UL (ref 0–0.7)
EOSINOPHIL NFR BLD AUTO: 1 %
ERYTHROCYTE [DISTWIDTH] IN BLOOD BY AUTOMATED COUNT: 13.4 % (ref 10–15)
GFR SERPL CREATININE-BSD FRML MDRD: 40 ML/MIN/1.73M2
GLUCOSE SERPL-MCNC: 273 MG/DL (ref 70–99)
GLUCOSE UR STRIP-MCNC: >=2000 MG/DL
HBA1C MFR BLD: 10.3 %
HCT VFR BLD AUTO: 35.6 % (ref 35–47)
HGB BLD-MCNC: 11.4 G/DL (ref 11.7–15.7)
HGB UR QL STRIP: ABNORMAL
HYALINE CASTS: 1 /LPF
IMM GRANULOCYTES # BLD: 0 10E3/UL
IMM GRANULOCYTES NFR BLD: 0 %
KETONES UR STRIP-MCNC: NEGATIVE MG/DL
LEUKOCYTE ESTERASE UR QL STRIP: ABNORMAL
LEVETIRACETAM SERPL-MCNC: 35.9 ΜG/ML (ref 10–40)
LYMPHOCYTES # BLD AUTO: 1.5 10E3/UL (ref 0.8–5.3)
LYMPHOCYTES NFR BLD AUTO: 35 %
MCH RBC QN AUTO: 28.6 PG (ref 26.5–33)
MCHC RBC AUTO-ENTMCNC: 32 G/DL (ref 31.5–36.5)
MCV RBC AUTO: 89 FL (ref 78–100)
MICROALBUMIN UR-MCNC: 121 MG/L
MICROALBUMIN/CREAT UR: 115.24 MG/G CR (ref 0–25)
MONOCYTES # BLD AUTO: 0.2 10E3/UL (ref 0–1.3)
MONOCYTES NFR BLD AUTO: 6 %
NEUTROPHILS # BLD AUTO: 2.5 10E3/UL (ref 1.6–8.3)
NEUTROPHILS NFR BLD AUTO: 57 %
NITRATE UR QL: NEGATIVE
NRBC # BLD AUTO: 0 10E3/UL
NRBC BLD AUTO-RTO: 0 /100
PH UR STRIP: 6 [PH] (ref 5–7)
PLATELET # BLD AUTO: 190 10E3/UL (ref 150–450)
POTASSIUM SERPL-SCNC: 4.6 MMOL/L (ref 3.4–5.3)
PROT SERPL-MCNC: 6.5 G/DL (ref 6.4–8.3)
RBC # BLD AUTO: 3.99 10E6/UL (ref 3.8–5.2)
RBC URINE: 2 /HPF
SODIUM SERPL-SCNC: 142 MMOL/L (ref 136–145)
SP GR UR STRIP: 1.02 (ref 1–1.03)
SQUAMOUS EPITHELIAL: 4 /HPF
T4 FREE SERPL-MCNC: 1.13 NG/DL (ref 0.9–1.7)
TSH SERPL DL<=0.005 MIU/L-ACNC: 0.06 UIU/ML (ref 0.3–4.2)
UROBILINOGEN UR STRIP-MCNC: NORMAL MG/DL
WBC # BLD AUTO: 4.4 10E3/UL (ref 4–11)
WBC URINE: 40 /HPF

## 2022-10-31 PROCEDURE — 80050 GENERAL HEALTH PANEL: CPT | Performed by: PATHOLOGY

## 2022-10-31 PROCEDURE — 91312 COVID-19,PF,PFIZER BOOSTER BIVALENT: CPT | Performed by: FAMILY MEDICINE

## 2022-10-31 PROCEDURE — 87086 URINE CULTURE/COLONY COUNT: CPT | Performed by: FAMILY MEDICINE

## 2022-10-31 PROCEDURE — 83036 HEMOGLOBIN GLYCOSYLATED A1C: CPT | Performed by: FAMILY MEDICINE

## 2022-10-31 PROCEDURE — 84439 ASSAY OF FREE THYROXINE: CPT | Performed by: FAMILY MEDICINE

## 2022-10-31 PROCEDURE — 81001 URINALYSIS AUTO W/SCOPE: CPT | Performed by: PATHOLOGY

## 2022-10-31 PROCEDURE — 82043 UR ALBUMIN QUANTITATIVE: CPT | Performed by: FAMILY MEDICINE

## 2022-10-31 PROCEDURE — G0008 ADMIN INFLUENZA VIRUS VAC: HCPCS | Performed by: FAMILY MEDICINE

## 2022-10-31 PROCEDURE — 0124A COVID-19,PF,PFIZER BOOSTER BIVALENT: CPT | Performed by: FAMILY MEDICINE

## 2022-10-31 PROCEDURE — 90662 IIV NO PRSV INCREASED AG IM: CPT | Performed by: FAMILY MEDICINE

## 2022-10-31 PROCEDURE — 84443 ASSAY THYROID STIM HORMONE: CPT | Performed by: FAMILY MEDICINE

## 2022-10-31 PROCEDURE — 99214 OFFICE O/P EST MOD 30 MIN: CPT | Mod: 25 | Performed by: FAMILY MEDICINE

## 2022-10-31 PROCEDURE — 80177 DRUG SCRN QUAN LEVETIRACETAM: CPT | Performed by: FAMILY MEDICINE

## 2022-10-31 PROCEDURE — 36415 COLL VENOUS BLD VENIPUNCTURE: CPT | Performed by: PATHOLOGY

## 2022-10-31 RX ORDER — DULOXETIN HYDROCHLORIDE 20 MG/1
20 CAPSULE, DELAYED RELEASE ORAL DAILY
Qty: 90 CAPSULE | Refills: 3 | Status: SHIPPED | OUTPATIENT
Start: 2022-10-31 | End: 2023-09-14

## 2022-10-31 RX ORDER — INSULIN GLARGINE 100 [IU]/ML
INJECTION, SOLUTION SUBCUTANEOUS
Qty: 30 ML | Refills: 11 | Status: SHIPPED | OUTPATIENT
Start: 2022-10-31 | End: 2023-03-15

## 2022-10-31 RX ORDER — ATORVASTATIN CALCIUM 40 MG/1
40 TABLET, FILM COATED ORAL DAILY
Qty: 90 TABLET | Refills: 3 | Status: SHIPPED | OUTPATIENT
Start: 2022-10-31 | End: 2023-09-14

## 2022-10-31 RX ORDER — DULOXETIN HYDROCHLORIDE 20 MG/1
20 CAPSULE, DELAYED RELEASE ORAL DAILY
Qty: 90 CAPSULE | Refills: 1 | Status: CANCELLED | OUTPATIENT
Start: 2022-10-31

## 2022-10-31 RX ORDER — CARVEDILOL 12.5 MG/1
12.5 TABLET ORAL 2 TIMES DAILY WITH MEALS
Qty: 180 TABLET | Refills: 3 | Status: SHIPPED | OUTPATIENT
Start: 2022-10-31 | End: 2023-09-14

## 2022-10-31 RX ORDER — DOXAZOSIN 1 MG/1
1 TABLET ORAL AT BEDTIME
Qty: 90 TABLET | Refills: 3 | Status: SHIPPED | OUTPATIENT
Start: 2022-10-31 | End: 2023-09-14

## 2022-10-31 RX ORDER — LEVETIRACETAM 500 MG/1
500 TABLET ORAL 2 TIMES DAILY
Qty: 186 TABLET | Refills: 3 | Status: ON HOLD | OUTPATIENT
Start: 2022-10-31 | End: 2022-12-14

## 2022-10-31 NOTE — PROGRESS NOTES
Assessment & Plan     Type 1 diabetes mellitus with other circulatory complication (H)  Results came back after the visit diabetes is not as well-controlled.  They had self adjusted their Premeal NovoLog and lowered the dose of Lantus to 20 units once daily suggesting need to increase the dose of Lantus.  I tried to call but voice mailbox was full.  I will send a [employer] message to contact me for adjust recommendations.  - Adult Eye  Referral  - Hemoglobin A1c  - Comprehensive metabolic panel  - CBC with platelets differential  - Albumin Random Urine Quantitative with Creat Ratio  - insulin glargine (LANTUS SOLOSTAR) 100 UNIT/ML pen  Dispense: 30 mL; Refill: 11    Left-sided nontraumatic intracerebral hemorrhage, unspecified cerebral location (H)  Essential hypertension  Her blood pressure is currently at control 132/84 on doxazosin 1 mg at bedtime and daughter adjusted dose of carvedilol to 12.5 mg twice daily.  As her blood pressures controlled on this we will continue this regimen.  - doxazosin (CARDURA) 1 MG tablet  Dispense: 90 tablet; Refill: 3  - carvedilol (COREG) 12.5 MG tablet  Dispense: 180 tablet; Refill: 3    Acquired hypothyroidism  Thyroid function came back after the visit need to adjust dose lower previously was on Synthroid 88 mcg she does require dispense as written therefore I lowered her dose to Synthroid 75 mcg.  She will need thyroid function checked in 1 to 3 months on adjusted dose.  We could check with her next A1c in 3 months.  - TSH with free T4 reflex  - SYNTHROID 75 MCG tablet  Dispense: 90 tablet; Refill: 3    Stage 3b chronic kidney disease (H)  Stable creatinine continue hydration and diabetes control optimization    Hyperlipidemia LDL goal <70  I renewed atorvastatin  - atorvastatin (LIPITOR) 40 MG tablet  Dispense: 90 tablet; Refill: 3    Fibromyalgia  Pain currently managed on Cymbalta renewed current dose.  She is staying off of Lyrica at this time.  - DULoxetine  (CYMBALTA) 20 MG capsule  Dispense: 90 capsule; Refill: 3    Tooth pain  She has dental pain and a loose tooth on upper molar needs dental cares  - Dental Referral    Encounter for immunization  Provided influenza vaccine  - FLU VACCINE HIGH DOSE PRESERVATIVE FREE, AGE =>65 YR    High priority for 2019-nCoV vaccine  Provided COVID bivalent booster  - COVID-19,PF,PFIZER BOOSTER BIVALENT 12+Yrs    Hx: UTI (urinary tract infection)  We will check follow-up urinary tract infection result came back suspicious for possible urinary tract infection await culture as she does not have significant symptoms  - UA with Micro reflex to Culture  Seizures (H)  Renewed Keppra no recent seizures she has not met with her neurologist recently to determine length of therapy however with previous CVA likely will need to continue Keppra  - levETIRAcetam (KEPPRA) 500 MG tablet  Dispense: 186 tablet; Refill: 3  - Keppra (Levetiracetam) Level  - Adult Neurology  Referral  30 minutes spent on the date of the encounter doing chart review, history, exam, diagnostics review, documentation, counseling and coordination of cares as noted.                   Return in about 6 months (around 5/3/2023).    Bony Castaneda MD  [facility]   Isabell is a 62 year old accompanied by her daughter, presenting for the following health issues:  Follow Up (Pt would like COVID booster; follow up from stroke; want to talk about medication for pain; pt has painful broken tooth) and Imm/Inj (COVID-19 VACCINE)    She presents with her daughter Vishal who manages her medications  HPI     Isabell is a 62 year old female with an extensive past medical history including hypertension, hyperlipidemia, diabetes mellitus insulin requiring, CKD stage 3b, coronary artery disease (s/p PCI 2004), hypothyroidism, degenerative arthritis, and prior history of ischemic stroke & Vascular dementia, seizure disorder who  presented to the ED with acute onset of facial droop, slurred speech, and right-sided weakness with non traumatic left hemorraghic stroke hospitalized with rehabilitation through [facility] from 06/27/2021 until 07/16/2021  care center discharged 7/16 to home with her daughter's cares.  She presented to the  emergency room on April 5, 2022 hospitalized for CVA and presented to the ER on 5/29/2022 with COVID.  Currently living at home with her daughter Vishal who assists with her medications understands diabetes very well.  They have an additional PCA assisting with her cares as her daughter now has a job as a wedding DJ. Her daughter indicates one of the admissions Isabell had a UTI which caused a decreased level of consciousness.  Diabetes   Last A1C  8.9 in May  Reviewed  Medications readings monitored closely by Daughter.  Lantus 20 units at bedtime  Novolog  with food premeal plus additional slindig scale Breeann lowered ( less insulin pre-meals)   Hypertension  Adjusted medications are Cardura 1 mg at bedtime  Breeann adjusted dose of carvedilol increased to 12.5 mg twice daily.  Her blood pressure has been under control in good range today. Not on Nifedipine currently.  Hypothyroidism  Maintaining on dose of Synthroid (d.a.w.) 88 mcg daily.  Last thyroid function looked like she was on too much medication therefore we need to check level and adjust dose accordingly  Hyperlipidemia  Atorvastatin 40 mg daily  Pain/ Mood  Increased pain as she ran out of Cymbalta 20 mg daily 3 days ago. She felt wobbly and was slightly better when she was off lyrica therefore they both agree to stay off Lyrica at this time.  Utilizing  Cymbalta 20 mg  Urinary/ fecal incontinence   Utilizes incontinence supplies.  They are wondering if she needs to have a urine sample checked.  Seizure HX  Keppra no recent seizures.  Maria Guadalupe has received approval for PCA care to provide to her mother.  HCM  Immunizations due Flu vaccine  and COVID bi valent booster.   ROS: Upper shoulder pain from infrequent use of upper body. She has been doing better with the assistance of her daughter.  Dental pain right upper gum        Labs reviewed in EPIC  BP Readings from Last 3 Encounters:   10/31/22 132/84   07/12/22 110/82   05/30/22 123/66    Wt Readings from Last 3 Encounters:   10/31/22 56 kg (123 lb 8 oz)   05/29/22 63.5 kg (140 lb)   05/14/22 63.9 kg (140 lb 14 oz)            Immunization History   Administered Date(s) Administered     COVID-19,PF,Pfizer (12+ Yrs) 03/17/2021, 04/06/2021, 10/25/2021     COVID-19,PF,Pfizer 12+ YRS BIVALENT Booster 10/31/2022     Influenza Vaccine IM > 6 months Valent IIV4 (Alfuria,Fluzone) 11/02/2020, 11/02/2020, 10/25/2021     Influenza, Quad, High Dose, Pf, 65yr+ (Fluzone HD) 10/31/2022     Mantoux Tuberculin Skin Test 06/28/2021, 07/12/2021           Patient Active Problem List   Diagnosis     Benign essential hypertension     Acquired hypothyroidism     Seizures (H)     Hyperlipidemia LDL goal <100     Vitamin D deficiency     Vitamin B12 deficiency (non anemic)     Other osteoarthritis of spine, unspecified spinal region     DKA (diabetic ketoacidoses)     Nephrolithiasis     Left renal mass     Chest pressure     Coronary atherosclerosis     Dehydration     DM type 1 (diabetes mellitus, type 1) (H)     HTN (hypertension)     Nausea & vomiting     Otitis media of right ear     Ischemic vascular dementia (H)     Left-sided nontraumatic intracerebral hemorrhage, unspecified cerebral location (H)     Otitis media of right ear     Other osteoarthritis of spine, unspecified spinal region     Vitamin D deficiency     Vitamin B12 deficiency (non anemic)     Seizures (H)     Nausea & vomiting     Nail deformity     Mixed stress and urge urinary incontinence     Luetscher's syndrome     Left-sided nontraumatic intracerebral hemorrhage, unspecified cerebral location (H)     Vascular dementia without behavioral disturbance  (H)     Fungal dermatitis     Type 1 diabetes mellitus with diabetic polyneuropathy (H)     Chest pressure     Essential hypertension     Dysphagia     Tobacco use     Dysthymia     History of diabetes with ketoacidosis     Stage 3b chronic kidney disease (H)     Type 2 diabetes mellitus with diabetic polyneuropathy, with long-term current use of insulin (H)     Full incontinence of feces     Acute CVA (cerebrovascular accident) (H)     Altered mental status, unspecified altered mental status type     Hyperglycemia     Type 2 diabetes mellitus with other specified complication, with long-term current use of insulin (H)     Infection due to 2019 novel coronavirus     Hx: UTI (urinary tract infection)     Fibromyalgia     Past Surgical History:   Procedure Laterality Date     athroplasty hip Bilateral     ,      OTHER SURGICAL HISTORY      athroplasty hip     STENT         Social History     Tobacco Use     Smoking status: Former     Packs/day: 0.50     Years: 35.00     Pack years: 17.50     Types: Cigarettes     Quit date: 2018     Years since quittin.3     Smokeless tobacco: Never   Substance Use Topics     Alcohol use: Not Currently     Family History   Problem Relation Age of Onset     Acute Myocardial Infarction Father      Heart Disease Maternal Grandmother      Dementia Maternal Grandmother      Myocardial Infarction Father      Dementia Paternal Grandmother      Heart Disease Paternal Grandmother          Current Outpatient Medications   Medication Sig Dispense Refill     atorvastatin (LIPITOR) 40 MG tablet Take 1 tablet (40 mg) by mouth daily 90 tablet 3     blood glucose (NO BRAND SPECIFIED) test strip Use to test blood sugar 4-5 times daily or as directed. 400 strip 3     carvedilol (COREG) 12.5 MG tablet Take 1 tablet (12.5 mg) by mouth 2 times daily (with meals) 180 tablet 3     cyanocobalamin (VITAMIN B-12) 1000 MCG tablet Take 1 tablet (1,000 mcg) by mouth daily 100 tablet 3      diclofenac (VOLTAREN) 1 % topical gel Apply 2 g topically 4 times daily as needed for moderate pain 150 g 1     doxazosin (CARDURA) 1 MG tablet Take 1 tablet (1 mg) by mouth At Bedtime 90 tablet 3     DULoxetine (CYMBALTA) 20 MG capsule Take 1 capsule (20 mg) by mouth daily 90 capsule 3     insulin glargine (LANTUS SOLOSTAR) 100 UNIT/ML pen INJECT 20 UNITS SUBCUTANEOUS AT BEDTIME 30 mL 11     insulin pen needle (31G X 8 MM) 31G X 8 MM miscellaneous Use 4 pen needles daily or as directed. 300 each 4     levETIRAcetam (KEPPRA) 500 MG tablet Take 1 tablet (500 mg) by mouth 2 times daily 186 tablet 3     loratadine (CLARITIN) 10 mg tablet [LORATADINE (CLARITIN) 10 MG TABLET] Take 10 mg by mouth daily.       melatonin 10 mg Tab Take 10 mg by mouth nightly as needed       SYNTHROID 75 MCG tablet Take 1 tablet (75 mcg) by mouth daily 90 tablet 3     Vitamin D3 (CHOLECALCIFEROL) 25 mcg (1000 units) tablet Take 1 tablet (25 mcg) by mouth daily 100 tablet 3     aspirin (ASA) 81 MG chewable tablet Take 1 tablet (81 mg) by mouth daily 30 tablet 0     insulin lispro (HUMALOG) 100 UNIT/ML vial Inject 1-10 Units Subcutaneous 3 times daily (before meals) Correction Scale - HIGH INSULIN RESISTANCE DOSING: Do Not give Correction Insulin if Pre-Meal BG less than 140. For Pre-Meal  - 164 give 1 unit. For Pre-Meal  - 189 give 2 units. For Pre-Meal  - 214 give 3 units. For Pre-Meal  - 239 give 4 units. For Pre-Meal  - 264 give 5 units. For Pre-Meal  - 289 give 6 units. For Pre-Meal  - 314 give 7 units. For Pre-Meal  - 339 give 8 units. For Pre-Meal  - 364 give 9 units. For Pre-Meal BG greater than or equal to 365 give 10 units.To be given with prandial insulin, and based on pre-meal blood glucose. Notify provider if glucose greater than or equal to 350 mg/dL after administration of correction dose. If given at mealtime, administer within 30 minutes of start of meal. 9 mL 0     insulin  lispro (HUMALOG) 100 UNIT/ML vial Inject 1-7 Units Subcutaneous At Bedtime HIGH INSULIN RESISTANCE DOSING: Do Not give Bedtime Correction Insulin if BG less than 200. For  - 224 give 1 units. For  - 249 give 2 units. For  - 274 give 3 units. For  - 299 give 4 units. For  - 324 give 5 units. For  - 349 give 6 units. For BG greater than or equal to 350 give 7 units. Notify provider if glucose greater than or equal to 350 mg/dL after administration of correction dose. If given at mealtime, administer within 30 minutes of start of meal. 2.1 mL 0     Allergies   Allergen Reactions     Bee Pollen Headache     Pollen Extract Headache     Recent Labs   Lab Test 10/31/22  1721 07/26/22  1703 05/30/22  0646 05/29/22  0116 05/03/22  0228 05/03/22  0227 04/06/22  0407 04/06/22  0014 04/05/22 2116 04/05/22 2115 04/05/22 2113 08/02/21  1009 07/07/21  0630 07/07/21  0630 06/27/21  0759   A1C 10.3*  --   --   --   --  8.9*  --   --   --  8.8*  --  8.2*   < >  --   --    LDL  --   --   --   --   --   --   --  39  --   --   --  75  --   --   --    HDL  --   --   --   --   --   --   --  34*  --   --   --  39*  --   --   --    TRIG  --   --   --   --   --   --   --  129  --   --   --  158*  --   --   --    ALT 28 25  --  26   < > 23   < >  --   --   --   --  17  --   --   --    CR 1.44* 1.13*   < > 1.71*   < > 1.84*   < > 1.20*   < >  --    < > 1.59*   < > 1.68* 1.48*   GFRESTIMATED 40* 54*   < > 33*   < > 30*   < > 51*   < >  --    < > 35*  --  31* 37*   GFRESTBLACK  --   --   --   --   --   --   --   --   --   --   --   --   --  37* 43*   POTASSIUM 4.6 4.3   < > 5.0   < > 5.5*   < >  --    < >  --   --  3.6   < > 3.9 3.5   TSH 0.06* 0.19*  --   --    < >  --   --   --   --   --   --  0.63  --   --   --     < > = values in this interval not displayed.      Review of Systems   Problem list, PMH, Surgical HX, FH, SH, allergies, medications,immunizations reviewed and updated in Epic.  ROS negative  other than noted in HPI and ROS.        Objective    /84 (BP Location: Right arm, Patient Position: Sitting, Cuff Size: Adult Regular)   Pulse 91   Temp 97.7  F (36.5  C)   Resp 20   Wt 56 kg (123 lb 8 oz)   SpO2 98%   BMI 20.55 kg/m    Body mass index is 20.55 kg/m .  Physical Exam   Constitutional: Sitting in a wheelchair alert but lets her daughter review HX, chronic poor health, well-groomed, cooperative.She communicates with me occasionally appropriately  HENT: TM normal.  Right pinna pedunculated growth she indicates has been present for years without change.  Head: Normocephalic and atraumatic.   Mouth/Throat: Wearing mask removed briefly hydrated no thrush she has dental decay and a loose tooth in the molar on the upper right.  Eyes: Conjunctivae and EOM are normal. Pupils are equal, round, and reactive to light. No scleral icterus.   Neck:  Neck supple.  No tracheal deviation present. No thyromegaly present.   Cardiovascular: Regular rhythm, normal heart sounds and intact distal pulses. No murmur present. Exam reveals no gallop and no friction rub.   Pulmonary/Chest: Effort normal and breath sounds normal. No respiratory distress.   Abdominal: Exam in chair soft obese. Bowel sounds are normal. No distension and no mass. No tenderness.   Musculoskeletal: Deconditioned upper and lower extremities.  Slow range of motion of shoulders due to muscle deconditioning. No edema and no tenderness. No bruising no signs of trauma  Lymphadenopathy:   No cervical adenopathy.   Neurological: Alert and cooperative.  Signs of vascular dementia. Able to move bilateral upper and lower extremities and cooperated with strength exam slightly decreased right grasp DTR s normal  Skin: No ulcerations or bruising noted on exposed skin   Psychiatric: Cooperative. More alert today and communicative through nods and a few words       Results for orders placed or performed in visit on 10/31/22   TSH with free T4 reflex      Status: Abnormal   Result Value Ref Range    TSH 0.06 (L) 0.30 - 4.20 uIU/mL   Hemoglobin A1c     Status: Abnormal   Result Value Ref Range    Hemoglobin A1C 10.3 (H) <5.7 %   Comprehensive metabolic panel     Status: Abnormal   Result Value Ref Range    Sodium 142 136 - 145 mmol/L    Potassium 4.6 3.4 - 5.3 mmol/L    Chloride 107 98 - 107 mmol/L    Carbon Dioxide (CO2) 26 22 - 29 mmol/L    Anion Gap 9 7 - 15 mmol/L    Urea Nitrogen 35.7 (H) 8.0 - 23.0 mg/dL    Creatinine 1.44 (H) 0.51 - 0.95 mg/dL    Calcium 9.6 8.8 - 10.2 mg/dL    Glucose 273 (H) 70 - 99 mg/dL    Alkaline Phosphatase 182 (H) 35 - 104 U/L    AST 37 (H) 10 - 35 U/L    ALT 28 10 - 35 U/L    Protein Total 6.5 6.4 - 8.3 g/dL    Albumin 3.8 3.5 - 5.2 g/dL    Bilirubin Total <0.2 <=1.2 mg/dL    GFR Estimate 40 (L) >60 mL/min/1.73m2   Albumin Random Urine Quantitative with Creat Ratio     Status: Abnormal   Result Value Ref Range    Albumin Urine mg/L 121.0 mg/L    Albumin Urine mg/g Cr 115.24 (H) 0.00 - 25.00 mg/g Cr    Creatinine Urine mg/dL 105.0 mg/dL   UA with Micro reflex to Culture     Status: Abnormal    Specimen: Urine, Midstream   Result Value Ref Range    Color Urine Yellow Colorless, Straw, Light Yellow, Yellow    Appearance Urine Slightly Cloudy (A) Clear    Glucose Urine >=2000 (A) Negative mg/dL    Bilirubin Urine Negative Negative    Ketones Urine Negative Negative mg/dL    Specific Gravity Urine 1.025 1.003 - 1.035    Blood Urine Trace (A) Negative    pH Urine 6.0 5.0 - 7.0    Protein Albumin Urine Trace (A) Negative mg/dL    Urobilinogen Urine Normal Normal, 2.0 mg/dL    Nitrite Urine Negative Negative    Leukocyte Esterase Urine Trace (A) Negative    Bacteria Urine Many (A) None Seen /HPF    RBC Urine 2 <=2 /HPF    WBC Urine 40 (H) <=5 /HPF    Squamous Epithelials Urine 4 (H) <=1 /HPF    Hyaline Casts Urine 1 <=2 /LPF    Narrative    Urine Culture ordered based on laboratory criteria   Keppra (Levetiracetam) Level     Status: Normal    Result Value Ref Range    Keppra (Levetiracetam) Level 35.9 10.0 - 40.0  g/mL   CBC with platelets and differential     Status: Abnormal   Result Value Ref Range    WBC Count 4.4 4.0 - 11.0 10e3/uL    RBC Count 3.99 3.80 - 5.20 10e6/uL    Hemoglobin 11.4 (L) 11.7 - 15.7 g/dL    Hematocrit 35.6 35.0 - 47.0 %    MCV 89 78 - 100 fL    MCH 28.6 26.5 - 33.0 pg    MCHC 32.0 31.5 - 36.5 g/dL    RDW 13.4 10.0 - 15.0 %    Platelet Count 190 150 - 450 10e3/uL    % Neutrophils 57 %    % Lymphocytes 35 %    % Monocytes 6 %    % Eosinophils 1 %    % Basophils 1 %    % Immature Granulocytes 0 %    NRBCs per 100 WBC 0 <1 /100    Absolute Neutrophils 2.5 1.6 - 8.3 10e3/uL    Absolute Lymphocytes 1.5 0.8 - 5.3 10e3/uL    Absolute Monocytes 0.2 0.0 - 1.3 10e3/uL    Absolute Eosinophils 0.1 0.0 - 0.7 10e3/uL    Absolute Basophils 0.0 0.0 - 0.2 10e3/uL    Absolute Immature Granulocytes 0.0 <=0.4 10e3/uL    Absolute NRBCs 0.0 10e3/uL   T4 free     Status: Normal   Result Value Ref Range    Free T4 1.13 0.90 - 1.70 ng/dL   CBC with platelets differential     Status: Abnormal    Narrative    The following orders were created for panel order CBC with platelets differential.  Procedure                               Abnormality         Status                     ---------                               -----------         ------                     CBC with platelets and d...[[id number]]  Abnormal            Final result                 Please view results for these tests on the individual orders.   The above reviewed after the end of the visit.  I tried to call but message box was full.  Bony Castaneda MD

## 2022-10-31 NOTE — NURSING NOTE
Isabell Matthews is a 62 year old female that presents in clinic today for the following:     Chief Complaint   Patient presents with     Follow Up     Pt would like COVID booster; follow up from stroke; want to talk about medication for pain; pt has painful broken tooth       The patient's allergies and medications were reviewed. The patient's vitals were obtained without incident. The patient does not have any other questions for the provider.     Geovanna Mcpherson, EMT at 4:16 PM on 10/31/2022.  Primary Care Clinic: [phone]

## 2022-11-01 PROBLEM — M79.7 FIBROMYALGIA: Status: ACTIVE | Noted: 2022-11-01

## 2022-11-01 PROBLEM — Z87.440 HX: UTI (URINARY TRACT INFECTION): Status: ACTIVE | Noted: 2022-11-01

## 2022-11-01 RX ORDER — LEVOTHYROXINE SODIUM 75 MCG
75 TABLET ORAL DAILY
Qty: 90 TABLET | Refills: 3 | Status: SHIPPED | OUTPATIENT
Start: 2022-11-01 | End: 2023-09-14

## 2022-11-02 ENCOUNTER — TELEPHONE (OUTPATIENT)
Dept: FAMILY MEDICINE | Facility: CLINIC | Age: 62
End: 2022-11-02

## 2022-11-02 DIAGNOSIS — B96.20 E. COLI UTI: Primary | ICD-10-CM

## 2022-11-02 DIAGNOSIS — N39.0 E. COLI UTI: Primary | ICD-10-CM

## 2022-11-02 LAB — BACTERIA UR CULT: ABNORMAL

## 2022-11-02 RX ORDER — NITROFURANTOIN 25; 75 MG/1; MG/1
100 CAPSULE ORAL 2 TIMES DAILY
Qty: 14 CAPSULE | Refills: 0 | Status: SHIPPED | OUTPATIENT
Start: 2022-11-02 | End: 2022-11-09

## 2022-11-02 NOTE — TELEPHONE ENCOUNTER
Results for orders placed or performed in visit on 10/31/22 (from the past 48 hour(s))   TSH with free T4 reflex   Result Value Ref Range    TSH 0.06 (L) 0.30 - 4.20 uIU/mL   Hemoglobin A1c   Result Value Ref Range    Hemoglobin A1C 10.3 (H) <5.7 %   Comprehensive metabolic panel   Result Value Ref Range    Sodium 142 136 - 145 mmol/L    Potassium 4.6 3.4 - 5.3 mmol/L    Chloride 107 98 - 107 mmol/L    Carbon Dioxide (CO2) 26 22 - 29 mmol/L    Anion Gap 9 7 - 15 mmol/L    Urea Nitrogen 35.7 (H) 8.0 - 23.0 mg/dL    Creatinine 1.44 (H) 0.51 - 0.95 mg/dL    Calcium 9.6 8.8 - 10.2 mg/dL    Glucose 273 (H) 70 - 99 mg/dL    Alkaline Phosphatase 182 (H) 35 - 104 U/L    AST 37 (H) 10 - 35 U/L    ALT 28 10 - 35 U/L    Protein Total 6.5 6.4 - 8.3 g/dL    Albumin 3.8 3.5 - 5.2 g/dL    Bilirubin Total <0.2 <=1.2 mg/dL    GFR Estimate 40 (L) >60 mL/min/1.73m2   CBC with platelets differential    Narrative    The following orders were created for panel order CBC with platelets differential.  Procedure                               Abnormality         Status                     ---------                               -----------         ------                     CBC with platelets and d...[[id number]]  Abnormal            Final result                 Please view results for these tests on the individual orders.   Albumin Random Urine Quantitative with Creat Ratio   Result Value Ref Range    Albumin Urine mg/L 121.0 mg/L    Albumin Urine mg/g Cr 115.24 (H) 0.00 - 25.00 mg/g Cr    Creatinine Urine mg/dL 105.0 mg/dL   UA with Micro reflex to Culture    Specimen: Urine, Midstream   Result Value Ref Range    Color Urine Yellow Colorless, Straw, Light Yellow, Yellow    Appearance Urine Slightly Cloudy (A) Clear    Glucose Urine >=2000 (A) Negative mg/dL    Bilirubin Urine Negative Negative    Ketones Urine Negative Negative mg/dL    Specific Gravity Urine 1.025 1.003 - 1.035    Blood Urine Trace (A) Negative    pH Urine 6.0 5.0 - 7.0     Protein Albumin Urine Trace (A) Negative mg/dL    Urobilinogen Urine Normal Normal, 2.0 mg/dL    Nitrite Urine Negative Negative    Leukocyte Esterase Urine Trace (A) Negative    Bacteria Urine Many (A) None Seen /HPF    RBC Urine 2 <=2 /HPF    WBC Urine 40 (H) <=5 /HPF    Squamous Epithelials Urine 4 (H) <=1 /HPF    Hyaline Casts Urine 1 <=2 /LPF    Narrative    Urine Culture ordered based on laboratory criteria   Keppra (Levetiracetam) Level   Result Value Ref Range    Keppra (Levetiracetam) Level 35.9 10.0 - 40.0  g/mL   CBC with platelets and differential   Result Value Ref Range    WBC Count 4.4 4.0 - 11.0 10e3/uL    RBC Count 3.99 3.80 - 5.20 10e6/uL    Hemoglobin 11.4 (L) 11.7 - 15.7 g/dL    Hematocrit 35.6 35.0 - 47.0 %    MCV 89 78 - 100 fL    MCH 28.6 26.5 - 33.0 pg    MCHC 32.0 31.5 - 36.5 g/dL    RDW 13.4 10.0 - 15.0 %    Platelet Count 190 150 - 450 10e3/uL    % Neutrophils 57 %    % Lymphocytes 35 %    % Monocytes 6 %    % Eosinophils 1 %    % Basophils 1 %    % Immature Granulocytes 0 %    NRBCs per 100 WBC 0 <1 /100    Absolute Neutrophils 2.5 1.6 - 8.3 10e3/uL    Absolute Lymphocytes 1.5 0.8 - 5.3 10e3/uL    Absolute Monocytes 0.2 0.0 - 1.3 10e3/uL    Absolute Eosinophils 0.1 0.0 - 0.7 10e3/uL    Absolute Basophils 0.0 0.0 - 0.2 10e3/uL    Absolute Immature Granulocytes 0.0 <=0.4 10e3/uL    Absolute NRBCs 0.0 10e3/uL   Urine Culture    Specimen: Urine, Midstream   Result Value Ref Range    Culture >100,000 CFU/mL Escherichia coli (A)        Susceptibility    Escherichia coli - LARRY     Ampicillin >=32 Resistant ug/mL     Ampicillin/ Sulbactam 16 Intermediate ug/mL     Piperacillin/Tazobactam <=4 Susceptible ug/mL     Cefazolin* <=4 Susceptible ug/mL      * Cefazolin LARRY breakpoints are for the treatment of uncomplicated urinary tract infections. For the treatment of systemic infections, please contact the laboratory for additional testing.     Cefoxitin 8 Susceptible ug/mL     Ceftazidime <=1  Susceptible ug/mL     Ceftriaxone <=1 Susceptible ug/mL     Cefepime <=1 Susceptible ug/mL     Gentamicin <=1 Susceptible ug/mL     Tobramycin <=1 Susceptible ug/mL     Ciprofloxacin <=0.25 Susceptible ug/mL     Levofloxacin <=0.12 Susceptible ug/mL     Nitrofurantoin <=16 Susceptible ug/mL     Trimethoprim/Sulfamethoxazole <=1/19 Susceptible ug/mL   T4 free   Result Value Ref Range    Free T4 1.13 0.90 - 1.70 ng/dL   [phone] (home)   I tried to call not able to leave a message on phone as voicemail box was full.  Patient has urinary tract infection requires nitrofurantoin 100 mg twice daily for 7 days I will send to the pharmacy.  Diabetes is also not controlled please ask her daughter to contact me with current regimen as it was not clear at the time of the appointment so we can adjust medication. Kidney function slightly impaired need to hydrate well.  Thyroid appears she was on too much medication previous Rx Synthroid 100 mcg I sent a reduced dose of Synthroid 75 mcg to her pharmacy.    I recommend a follow-up in 1-2 months earlier than planned.  Best wishes,  Bony Castaneda MD

## 2022-11-02 NOTE — RESULT ENCOUNTER NOTE
Dear Isabell Matthews   [phone] (home)   I tried to call  several times not able to leave a message on phone as voicemail box was full.    She has urinary tract infection requires nitrofurantoin 100 mg twice daily for 7 days I will send to the pharmacy.   Diabetes is also not controlled please contact me with current regimen as it was not clear at the time of the appointment so we can adjust medication. Kidney function slightly impaired need to hydrate well.   Thyroid appears  you were on too much medication previous Rx Synthroid 100 mcg I sent a reduced dose of Synthroid 75 mcg to her pharmacy.   Please call back [phone] with a time and number I may reach you to review.   I recommend a follow-up in 1-2 months earlier than planned.   Best wishes,   Bony Castaneda MD

## 2022-11-03 NOTE — TELEPHONE ENCOUNTER
RN left detailed message with Dr. Castaneda's message, and asked patient's daughter, Maria Guadalupe, to call [facility] back and give a good time frame when Dr. Castaneda can call and discuss diabetes medication possible change after reviewing current regimen.  RN relayed that medications were sent to pharmacy for UTI and for new thyroid dose.  RN also asked Maria Guadalupe to call back to schedule a follow up appt for patient with Dr. Castaneda in 1-2 months.    Dorina Argueta RN on 11/3/2022 at 10:56 AM

## 2022-12-10 ENCOUNTER — APPOINTMENT (OUTPATIENT)
Dept: CT IMAGING | Facility: CLINIC | Age: 62
DRG: 101 | End: 2022-12-10
Attending: PSYCHIATRY & NEUROLOGY
Payer: MEDICARE

## 2022-12-10 ENCOUNTER — APPOINTMENT (OUTPATIENT)
Dept: GENERAL RADIOLOGY | Facility: CLINIC | Age: 62
DRG: 101 | End: 2022-12-10
Attending: EMERGENCY MEDICINE
Payer: MEDICARE

## 2022-12-10 ENCOUNTER — APPOINTMENT (OUTPATIENT)
Dept: NEUROLOGY | Facility: CLINIC | Age: 62
DRG: 101 | End: 2022-12-10
Payer: MEDICARE

## 2022-12-10 ENCOUNTER — HOSPITAL ENCOUNTER (INPATIENT)
Facility: CLINIC | Age: 62
LOS: 4 days | Discharge: HOME | DRG: 101 | End: 2022-12-14
Attending: EMERGENCY MEDICINE | Admitting: STUDENT IN AN ORGANIZED HEALTH CARE EDUCATION/TRAINING PROGRAM
Payer: MEDICARE

## 2022-12-10 ENCOUNTER — APPOINTMENT (OUTPATIENT)
Dept: MRI IMAGING | Facility: CLINIC | Age: 62
DRG: 101 | End: 2022-12-10
Attending: PSYCHIATRY & NEUROLOGY
Payer: MEDICARE

## 2022-12-10 DIAGNOSIS — N39.0 E. COLI UTI: ICD-10-CM

## 2022-12-10 DIAGNOSIS — R32 URINARY INCONTINENCE, UNSPECIFIED TYPE: ICD-10-CM

## 2022-12-10 DIAGNOSIS — R41.82 ALTERED MENTAL STATUS, UNSPECIFIED ALTERED MENTAL STATUS TYPE: ICD-10-CM

## 2022-12-10 DIAGNOSIS — G40.919 INTRACTABLE EPILEPSY WITHOUT STATUS EPILEPTICUS, UNSPECIFIED EPILEPSY TYPE (H): ICD-10-CM

## 2022-12-10 DIAGNOSIS — K21.9 GASTROESOPHAGEAL REFLUX DISEASE, UNSPECIFIED WHETHER ESOPHAGITIS PRESENT: ICD-10-CM

## 2022-12-10 DIAGNOSIS — R56.9 SEIZURES (H): ICD-10-CM

## 2022-12-10 DIAGNOSIS — G40.911 INTRACTABLE EPILEPSY WITH STATUS EPILEPTICUS, UNSPECIFIED EPILEPSY TYPE (H): ICD-10-CM

## 2022-12-10 DIAGNOSIS — R15.9 FULL INCONTINENCE OF FECES: Primary | ICD-10-CM

## 2022-12-10 DIAGNOSIS — B96.20 E. COLI UTI: ICD-10-CM

## 2022-12-10 DIAGNOSIS — Z20.822 CONTACT WITH AND (SUSPECTED) EXPOSURE TO COVID-19: ICD-10-CM

## 2022-12-10 DIAGNOSIS — E51.9 THIAMINE DEFICIENCY: ICD-10-CM

## 2022-12-10 LAB
ALBUMIN SERPL BCG-MCNC: 4.1 G/DL (ref 3.5–5.2)
ALBUMIN UR-MCNC: 30 MG/DL
ALP SERPL-CCNC: 155 U/L (ref 35–104)
ALT SERPL W P-5'-P-CCNC: 21 U/L (ref 10–35)
AMMONIA PLAS-SCNC: 13 UMOL/L (ref 11–51)
ANION GAP SERPL CALCULATED.3IONS-SCNC: 14 MMOL/L (ref 7–15)
APPEARANCE UR: CLEAR
AST SERPL W P-5'-P-CCNC: 24 U/L (ref 10–35)
ATRIAL RATE - MUSE: 113 BPM
ATRIAL RATE - MUSE: 120 BPM
BACTERIA #/AREA URNS HPF: ABNORMAL /HPF
BASOPHILS # BLD AUTO: 0 10E3/UL (ref 0–0.2)
BASOPHILS NFR BLD AUTO: 0 %
BILIRUB SERPL-MCNC: 0.4 MG/DL
BILIRUB UR QL STRIP: NEGATIVE
BUN SERPL-MCNC: 26.5 MG/DL (ref 8–23)
CA-I BLD-MCNC: 5 MG/DL (ref 4.4–5.2)
CALCIUM SERPL-MCNC: 9.7 MG/DL (ref 8.8–10.2)
CHLORIDE SERPL-SCNC: 104 MMOL/L (ref 98–107)
COLOR UR AUTO: ABNORMAL
CPB POCT: NO
CREAT BLD-MCNC: 1.3 MG/DL (ref 0.5–1)
CREAT SERPL-MCNC: 1.18 MG/DL (ref 0.51–0.95)
CRP SERPL-MCNC: 5.03 MG/L
DEPRECATED HCO3 PLAS-SCNC: 24 MMOL/L (ref 22–29)
DIASTOLIC BLOOD PRESSURE - MUSE: NORMAL MMHG
DIASTOLIC BLOOD PRESSURE - MUSE: NORMAL MMHG
EOSINOPHIL # BLD AUTO: 0 10E3/UL (ref 0–0.7)
EOSINOPHIL NFR BLD AUTO: 0 %
ERYTHROCYTE [DISTWIDTH] IN BLOOD BY AUTOMATED COUNT: 13.3 % (ref 10–15)
ERYTHROCYTE [SEDIMENTATION RATE] IN BLOOD BY WESTERGREN METHOD: 18 MM/HR (ref 0–30)
GFR SERPL CREATININE-BSD FRML MDRD: 46 ML/MIN/1.73M2
GFR SERPL CREATININE-BSD FRML MDRD: 51 ML/MIN/1.73M2
GLUCOSE BLD-MCNC: 147 MG/DL (ref 70–99)
GLUCOSE BLDC GLUCOMTR-MCNC: 157 MG/DL (ref 70–99)
GLUCOSE BLDC GLUCOMTR-MCNC: 198 MG/DL (ref 70–99)
GLUCOSE BLDC GLUCOMTR-MCNC: 223 MG/DL (ref 70–99)
GLUCOSE SERPL-MCNC: 152 MG/DL (ref 70–99)
GLUCOSE UR STRIP-MCNC: 200 MG/DL
HCO3 BLDV-SCNC: 29 MMOL/L (ref 21–28)
HCT VFR BLD AUTO: 39.8 % (ref 35–47)
HCT VFR BLD CALC: 39 % (ref 35–47)
HGB BLD-MCNC: 13 G/DL (ref 11.7–15.7)
HGB BLD-MCNC: 13.3 G/DL (ref 11.7–15.7)
HGB UR QL STRIP: NEGATIVE
IMM GRANULOCYTES # BLD: 0 10E3/UL
IMM GRANULOCYTES NFR BLD: 0 %
INR BLD: 1.1 (ref 2–3)
INR PPP: 1 (ref 0.85–1.15)
INTERPRETATION ECG - MUSE: NORMAL
INTERPRETATION ECG - MUSE: NORMAL
KETONES UR STRIP-MCNC: 20 MG/DL
LACTATE SERPL-SCNC: 1.3 MMOL/L (ref 0.7–2)
LACTATE SERPL-SCNC: 1.9 MMOL/L (ref 0.7–2)
LEUKOCYTE ESTERASE UR QL STRIP: ABNORMAL
LEVETIRACETAM SERPL-MCNC: 21.8 ΜG/ML (ref 10–40)
LYMPHOCYTES # BLD AUTO: 0.9 10E3/UL (ref 0.8–5.3)
LYMPHOCYTES NFR BLD AUTO: 11 %
MAGNESIUM SERPL-MCNC: 1.7 MG/DL (ref 1.7–2.3)
MCH RBC QN AUTO: 29 PG (ref 26.5–33)
MCHC RBC AUTO-ENTMCNC: 32.7 G/DL (ref 31.5–36.5)
MCV RBC AUTO: 89 FL (ref 78–100)
MONOCYTES # BLD AUTO: 0.2 10E3/UL (ref 0–1.3)
MONOCYTES NFR BLD AUTO: 3 %
MUCOUS THREADS #/AREA URNS LPF: PRESENT /LPF
NEUTROPHILS # BLD AUTO: 6.9 10E3/UL (ref 1.6–8.3)
NEUTROPHILS NFR BLD AUTO: 86 %
NITRATE UR QL: POSITIVE
NRBC # BLD AUTO: 0 10E3/UL
NRBC BLD AUTO-RTO: 0 /100
P AXIS - MUSE: 28 DEGREES
P AXIS - MUSE: 62 DEGREES
PCO2 BLDV: 54 MM HG (ref 40–50)
PH BLDV: 7.33 [PH] (ref 7.32–7.43)
PH UR STRIP: 5 [PH] (ref 5–7)
PLATELET # BLD AUTO: 185 10E3/UL (ref 150–450)
PO2 BLDV: 15 MM HG (ref 25–47)
POTASSIUM BLD-SCNC: 4.2 MMOL/L (ref 3.4–5.3)
POTASSIUM SERPL-SCNC: 4.4 MMOL/L (ref 3.4–5.3)
PR INTERVAL - MUSE: 134 MS
PR INTERVAL - MUSE: 138 MS
PROT SERPL-MCNC: 7.3 G/DL (ref 6.4–8.3)
QRS DURATION - MUSE: 88 MS
QRS DURATION - MUSE: 90 MS
QT - MUSE: 354 MS
QT - MUSE: 364 MS
QTC - MUSE: 499 MS
QTC - MUSE: 500 MS
R AXIS - MUSE: 51 DEGREES
R AXIS - MUSE: 66 DEGREES
RBC # BLD AUTO: 4.48 10E6/UL (ref 3.8–5.2)
RBC URINE: 1 /HPF
SAO2 % BLDV: 17 % (ref 94–100)
SARS-COV-2 RNA RESP QL NAA+PROBE: NEGATIVE
SODIUM BLD-SCNC: 141 MMOL/L (ref 133–144)
SODIUM SERPL-SCNC: 142 MMOL/L (ref 136–145)
SP GR UR STRIP: 1.01 (ref 1–1.03)
SQUAMOUS EPITHELIAL: <1 /HPF
SYSTOLIC BLOOD PRESSURE - MUSE: NORMAL MMHG
SYSTOLIC BLOOD PRESSURE - MUSE: NORMAL MMHG
T AXIS - MUSE: 69 DEGREES
T AXIS - MUSE: 73 DEGREES
TROPONIN T BLD-MCNC: 0.01 UG/L
TROPONIN T SERPL HS-MCNC: 19 NG/L
UROBILINOGEN UR STRIP-MCNC: NORMAL MG/DL
VENTRICULAR RATE- MUSE: 113 BPM
VENTRICULAR RATE- MUSE: 120 BPM
WBC # BLD AUTO: 8 10E3/UL (ref 4–11)
WBC URINE: 3 /HPF

## 2022-12-10 PROCEDURE — 83605 ASSAY OF LACTIC ACID: CPT | Performed by: EMERGENCY MEDICINE

## 2022-12-10 PROCEDURE — 82803 BLOOD GASES ANY COMBINATION: CPT

## 2022-12-10 PROCEDURE — 71045 X-RAY EXAM CHEST 1 VIEW: CPT

## 2022-12-10 PROCEDURE — 96366 THER/PROPH/DIAG IV INF ADDON: CPT | Performed by: EMERGENCY MEDICINE

## 2022-12-10 PROCEDURE — 70496 CT ANGIOGRAPHY HEAD: CPT | Mod: 26 | Performed by: RADIOLOGY

## 2022-12-10 PROCEDURE — 87040 BLOOD CULTURE FOR BACTERIA: CPT | Performed by: EMERGENCY MEDICINE

## 2022-12-10 PROCEDURE — 96365 THER/PROPH/DIAG IV INF INIT: CPT | Mod: 59 | Performed by: EMERGENCY MEDICINE

## 2022-12-10 PROCEDURE — 99207 EEG VIDEO 12-26 HR UNMONITORED: CPT | Performed by: PSYCHIATRY & NEUROLOGY

## 2022-12-10 PROCEDURE — 85014 HEMATOCRIT: CPT | Performed by: EMERGENCY MEDICINE

## 2022-12-10 PROCEDURE — 96375 TX/PRO/DX INJ NEW DRUG ADDON: CPT | Performed by: EMERGENCY MEDICINE

## 2022-12-10 PROCEDURE — 70496 CT ANGIOGRAPHY HEAD: CPT | Mod: MA

## 2022-12-10 PROCEDURE — 99291 CRITICAL CARE FIRST HOUR: CPT | Mod: 25 | Performed by: EMERGENCY MEDICINE

## 2022-12-10 PROCEDURE — 87086 URINE CULTURE/COLONY COUNT: CPT | Performed by: EMERGENCY MEDICINE

## 2022-12-10 PROCEDURE — 93010 ELECTROCARDIOGRAM REPORT: CPT | Performed by: EMERGENCY MEDICINE

## 2022-12-10 PROCEDURE — 36415 COLL VENOUS BLD VENIPUNCTURE: CPT | Performed by: EMERGENCY MEDICINE

## 2022-12-10 PROCEDURE — 93005 ELECTROCARDIOGRAM TRACING: CPT | Performed by: EMERGENCY MEDICINE

## 2022-12-10 PROCEDURE — 258N000003 HC RX IP 258 OP 636: Performed by: EMERGENCY MEDICINE

## 2022-12-10 PROCEDURE — 82565 ASSAY OF CREATININE: CPT

## 2022-12-10 PROCEDURE — 82140 ASSAY OF AMMONIA: CPT | Performed by: EMERGENCY MEDICINE

## 2022-12-10 PROCEDURE — 999N000176 HC STATISTIC STROKE CODE W/O ACCESS

## 2022-12-10 PROCEDURE — 250N000011 HC RX IP 250 OP 636

## 2022-12-10 PROCEDURE — 250N000011 HC RX IP 250 OP 636: Performed by: EMERGENCY MEDICINE

## 2022-12-10 PROCEDURE — 71045 X-RAY EXAM CHEST 1 VIEW: CPT | Mod: 26 | Performed by: RADIOLOGY

## 2022-12-10 PROCEDURE — 95720 EEG PHY/QHP EA INCR W/VEEG: CPT | Performed by: PSYCHIATRY & NEUROLOGY

## 2022-12-10 PROCEDURE — 70498 CT ANGIOGRAPHY NECK: CPT | Mod: 26 | Performed by: RADIOLOGY

## 2022-12-10 PROCEDURE — 95714 VEEG EA 12-26 HR UNMNTR: CPT

## 2022-12-10 PROCEDURE — 0042T CT HEAD PERFUSION W CONTRAST: CPT

## 2022-12-10 PROCEDURE — 96376 TX/PRO/DX INJ SAME DRUG ADON: CPT | Performed by: EMERGENCY MEDICINE

## 2022-12-10 PROCEDURE — 0042T CT HEAD PERFUSION W CONTRAST: CPT | Mod: GC | Performed by: RADIOLOGY

## 2022-12-10 PROCEDURE — 82947 ASSAY GLUCOSE BLOOD QUANT: CPT | Performed by: EMERGENCY MEDICINE

## 2022-12-10 PROCEDURE — 82803 BLOOD GASES ANY COMBINATION: CPT | Performed by: STUDENT IN AN ORGANIZED HEALTH CARE EDUCATION/TRAINING PROGRAM

## 2022-12-10 PROCEDURE — 70450 CT HEAD/BRAIN W/O DYE: CPT | Mod: MA

## 2022-12-10 PROCEDURE — 83605 ASSAY OF LACTIC ACID: CPT | Performed by: STUDENT IN AN ORGANIZED HEALTH CARE EDUCATION/TRAINING PROGRAM

## 2022-12-10 PROCEDURE — C9803 HOPD COVID-19 SPEC COLLECT: HCPCS | Performed by: EMERGENCY MEDICINE

## 2022-12-10 PROCEDURE — 99291 CRITICAL CARE FIRST HOUR: CPT | Mod: GC | Performed by: PSYCHIATRY & NEUROLOGY

## 2022-12-10 PROCEDURE — U0005 INFEC AGEN DETEC AMPLI PROBE: HCPCS | Performed by: EMERGENCY MEDICINE

## 2022-12-10 PROCEDURE — 96361 HYDRATE IV INFUSION ADD-ON: CPT | Performed by: EMERGENCY MEDICINE

## 2022-12-10 PROCEDURE — 84484 ASSAY OF TROPONIN QUANT: CPT | Performed by: EMERGENCY MEDICINE

## 2022-12-10 PROCEDURE — 85610 PROTHROMBIN TIME: CPT | Performed by: EMERGENCY MEDICINE

## 2022-12-10 PROCEDURE — 250N000011 HC RX IP 250 OP 636: Performed by: STUDENT IN AN ORGANIZED HEALTH CARE EDUCATION/TRAINING PROGRAM

## 2022-12-10 PROCEDURE — 70551 MRI BRAIN STEM W/O DYE: CPT | Mod: MA

## 2022-12-10 PROCEDURE — 83735 ASSAY OF MAGNESIUM: CPT | Performed by: EMERGENCY MEDICINE

## 2022-12-10 PROCEDURE — 258N000003 HC RX IP 258 OP 636

## 2022-12-10 PROCEDURE — 85652 RBC SED RATE AUTOMATED: CPT | Performed by: EMERGENCY MEDICINE

## 2022-12-10 PROCEDURE — 80177 DRUG SCRN QUAN LEVETIRACETAM: CPT | Performed by: EMERGENCY MEDICINE

## 2022-12-10 PROCEDURE — 70551 MRI BRAIN STEM W/O DYE: CPT | Mod: 26 | Performed by: RADIOLOGY

## 2022-12-10 PROCEDURE — 85610 PROTHROMBIN TIME: CPT

## 2022-12-10 PROCEDURE — 81001 URINALYSIS AUTO W/SCOPE: CPT | Performed by: EMERGENCY MEDICINE

## 2022-12-10 PROCEDURE — 82947 ASSAY GLUCOSE BLOOD QUANT: CPT

## 2022-12-10 PROCEDURE — 120N000002 HC R&B MED SURG/OB UMMC

## 2022-12-10 PROCEDURE — 99285 EMERGENCY DEPT VISIT HI MDM: CPT | Mod: 25 | Performed by: EMERGENCY MEDICINE

## 2022-12-10 PROCEDURE — 70498 CT ANGIOGRAPHY NECK: CPT | Mod: MA

## 2022-12-10 PROCEDURE — 86140 C-REACTIVE PROTEIN: CPT | Performed by: EMERGENCY MEDICINE

## 2022-12-10 PROCEDURE — 84484 ASSAY OF TROPONIN QUANT: CPT

## 2022-12-10 RX ORDER — HYDRALAZINE HYDROCHLORIDE 20 MG/ML
10 INJECTION INTRAMUSCULAR; INTRAVENOUS ONCE
Status: COMPLETED | OUTPATIENT
Start: 2022-12-10 | End: 2022-12-10

## 2022-12-10 RX ORDER — AMOXICILLIN 250 MG
2 CAPSULE ORAL 2 TIMES DAILY PRN
Status: DISCONTINUED | OUTPATIENT
Start: 2022-12-10 | End: 2022-12-14 | Stop reason: HOSPADM

## 2022-12-10 RX ORDER — LABETALOL HYDROCHLORIDE 5 MG/ML
10 INJECTION, SOLUTION INTRAVENOUS ONCE
Status: COMPLETED | OUTPATIENT
Start: 2022-12-10 | End: 2022-12-10

## 2022-12-10 RX ORDER — SODIUM CHLORIDE 9 MG/ML
INJECTION, SOLUTION INTRAVENOUS CONTINUOUS
Status: DISCONTINUED | OUTPATIENT
Start: 2022-12-10 | End: 2022-12-13

## 2022-12-10 RX ORDER — SODIUM CHLORIDE 9 MG/ML
INJECTION, SOLUTION INTRAVENOUS CONTINUOUS
Status: DISCONTINUED | OUTPATIENT
Start: 2022-12-10 | End: 2022-12-11

## 2022-12-10 RX ORDER — AMOXICILLIN 250 MG
1 CAPSULE ORAL 2 TIMES DAILY PRN
Status: DISCONTINUED | OUTPATIENT
Start: 2022-12-10 | End: 2022-12-14 | Stop reason: HOSPADM

## 2022-12-10 RX ORDER — POLYETHYLENE GLYCOL 3350 17 G/17G
17 POWDER, FOR SOLUTION ORAL DAILY PRN
Status: DISCONTINUED | OUTPATIENT
Start: 2022-12-10 | End: 2022-12-14 | Stop reason: HOSPADM

## 2022-12-10 RX ORDER — LORAZEPAM 2 MG/ML
INJECTION INTRAMUSCULAR
Status: DISCONTINUED
Start: 2022-12-10 | End: 2022-12-10 | Stop reason: HOSPADM

## 2022-12-10 RX ORDER — DEXTROSE MONOHYDRATE 25 G/50ML
25-50 INJECTION, SOLUTION INTRAVENOUS
Status: DISCONTINUED | OUTPATIENT
Start: 2022-12-10 | End: 2022-12-12

## 2022-12-10 RX ORDER — ONDANSETRON 2 MG/ML
4 INJECTION INTRAMUSCULAR; INTRAVENOUS EVERY 6 HOURS PRN
Status: DISCONTINUED | OUTPATIENT
Start: 2022-12-10 | End: 2022-12-14 | Stop reason: HOSPADM

## 2022-12-10 RX ORDER — LEVETIRACETAM 5 MG/ML
500 INJECTION INTRAVASCULAR EVERY 12 HOURS
Status: DISCONTINUED | OUTPATIENT
Start: 2022-12-10 | End: 2022-12-12

## 2022-12-10 RX ORDER — NICOTINE POLACRILEX 4 MG
15-30 LOZENGE BUCCAL
Status: DISCONTINUED | OUTPATIENT
Start: 2022-12-10 | End: 2022-12-12

## 2022-12-10 RX ORDER — LIDOCAINE 40 MG/G
CREAM TOPICAL
Status: DISCONTINUED | OUTPATIENT
Start: 2022-12-10 | End: 2022-12-14 | Stop reason: HOSPADM

## 2022-12-10 RX ORDER — LORAZEPAM 2 MG/ML
1 INJECTION INTRAMUSCULAR ONCE
Status: COMPLETED | OUTPATIENT
Start: 2022-12-10 | End: 2022-12-10

## 2022-12-10 RX ORDER — LORAZEPAM 2 MG/ML
2 INJECTION INTRAMUSCULAR ONCE
Status: COMPLETED | OUTPATIENT
Start: 2022-12-10 | End: 2022-12-10

## 2022-12-10 RX ORDER — IOPAMIDOL 755 MG/ML
115 INJECTION, SOLUTION INTRAVASCULAR ONCE
Status: COMPLETED | OUTPATIENT
Start: 2022-12-10 | End: 2022-12-10

## 2022-12-10 RX ORDER — ENOXAPARIN SODIUM 100 MG/ML
40 INJECTION SUBCUTANEOUS EVERY 24 HOURS
Status: DISCONTINUED | OUTPATIENT
Start: 2022-12-10 | End: 2022-12-14 | Stop reason: HOSPADM

## 2022-12-10 RX ORDER — ONDANSETRON 4 MG/1
4 TABLET, ORALLY DISINTEGRATING ORAL EVERY 6 HOURS PRN
Status: DISCONTINUED | OUTPATIENT
Start: 2022-12-10 | End: 2022-12-14 | Stop reason: HOSPADM

## 2022-12-10 RX ORDER — LEVETIRACETAM 10 MG/ML
1000 INJECTION INTRAVASCULAR ONCE
Status: COMPLETED | OUTPATIENT
Start: 2022-12-10 | End: 2022-12-10

## 2022-12-10 RX ADMIN — SODIUM CHLORIDE 1000 ML: 9 INJECTION, SOLUTION INTRAVENOUS at 15:38

## 2022-12-10 RX ADMIN — ENOXAPARIN SODIUM 40 MG: 40 INJECTION SUBCUTANEOUS at 20:09

## 2022-12-10 RX ADMIN — LEVETIRACETAM 1000 MG: 10 INJECTION INTRAVENOUS at 14:25

## 2022-12-10 RX ADMIN — LABETALOL HYDROCHLORIDE 10 MG: 5 INJECTION, SOLUTION INTRAVENOUS at 15:36

## 2022-12-10 RX ADMIN — HYDRALAZINE HYDROCHLORIDE 10 MG: 20 INJECTION INTRAMUSCULAR; INTRAVENOUS at 16:36

## 2022-12-10 RX ADMIN — LORAZEPAM 2 MG: 2 INJECTION INTRAMUSCULAR; INTRAVENOUS at 12:25

## 2022-12-10 RX ADMIN — SODIUM CHLORIDE 1000 ML: 9 INJECTION, SOLUTION INTRAVENOUS at 14:29

## 2022-12-10 RX ADMIN — LORAZEPAM 1 MG: 2 INJECTION INTRAMUSCULAR; INTRAVENOUS at 11:36

## 2022-12-10 RX ADMIN — LABETALOL HYDROCHLORIDE 10 MG: 5 INJECTION, SOLUTION INTRAVENOUS at 14:25

## 2022-12-10 RX ADMIN — SODIUM CHLORIDE: 9 INJECTION, SOLUTION INTRAVENOUS at 20:09

## 2022-12-10 RX ADMIN — LEVETIRACETAM 1000 MG: 10 INJECTION INTRAVENOUS at 12:25

## 2022-12-10 RX ADMIN — SODIUM CHLORIDE: 9 INJECTION, SOLUTION INTRAVENOUS at 15:36

## 2022-12-10 RX ADMIN — LEVETIRACETAM 500 MG: 5 INJECTION INTRAVENOUS at 20:09

## 2022-12-10 RX ADMIN — IOPAMIDOL 115 ML: 755 INJECTION, SOLUTION INTRAVENOUS at 11:41

## 2022-12-10 ASSESSMENT — ACTIVITIES OF DAILY LIVING (ADL)
ADLS_ACUITY_SCORE: 35

## 2022-12-10 NOTE — PROGRESS NOTES
[facility]      Neurology Stroke Code    Patient Name: Isabell Matthews  : 1960 MRN#: [id number]    STROKE DATA     Stroke Code:  Time called:  12/10/2022 c.11:30  Time patient seen:  12/10/2022 c. 11:30 (Dr. Tavia Kim)  Onset of symptoms:  12/10/2022 Noticed by daughter around 10:45  Last known normal (pt's baseline):  12/10/2022 01:30  Head CT read by Dr. Charlie Bell at:  12/10/2022 12:22 - final result signed at 13:26  Stroke Code de-escalated at 12/10/2022 13:45 after discussion with Dr. Eveline Sanabria due to no evidence of stroke on imaging.    TPA treatment:  Not given due to no evidence of stroke - also, outside of appropriate window..    [employer] Stroke Scale (at presentation)  NIHSS done at:  time patient seen      Score    Level of consciousness:  (3)   Responds w/ reflex motor/autonomic effect or no response    LOC questions:  (2)   Answers neither question correctly    LOC commands:  (2)   Performs neither task correctly    Best gaze:  (2)   Forced deviation    Visual:  (0)   No visual loss    Facial palsy:  (0)   Normal symmetrical movements    Motor arm (left):  (4)   No movement    Motor arm (right):  (4)   No movement    Motor leg (left):  (4)   No movement    Motor leg (right):  (4)   No movement    Limb ataxia:  (0)   Absent    Sensory:  (0)   Normal- no sensory loss    Best language:  (3)   Mute- global aphasia    Dysarthria:  (2)   Severe dysarthria    Extinction and inattention:  (2)   Profound yoselyn-inattention / extinction > one modality        NIHSS Total Score:  32           ASSESSMENT & RECOMMENDATONS     Stroke code activated due to AMS - deviated gaze, inability to follow commands or respond, hx of previous strokes.      Maryjo Mace RN   Pager: [phone]

## 2022-12-10 NOTE — ED PROVIDER NOTES
[place] EMERGENC[facility] DE[facility]MENT ([facility])  12/10/22    History   No chief complaint on file.    The history is provided by medical records and the EMS personnel. The history is limited by the condition of the patient (unresponsive).     Isabell Matthews is a 62 year old female with history of ischemic stroke, vascular dementia, seizure disorder, CKD stage 3, type 1 diabetes mellitus, CAD s/p PCI, HTN, and HLD who presents to the Emergency Department via EMS after being found unresponsive by family. Daughter reported to EMS last known well was around 1:30 am. Patient was found in bed this morning to be unresponsive and gazing to the left. Her baseline is alert and oriented per EMS. Patient's blood glucose en route was 147.    Past Medical History  Past Medical History:   Diagnosis Date     Chronic pain      Coronary atherosclerosis 6/14/2016     Essential hypertension      Ex-cigarette smoker     quit 7/2018 over 35 years     Ex-cigarette smoker      Hyperlipidemia      Hypothyroid      Seizures (H)      Stroke (H)      Vascular dementia (H)      Past Surgical History:   Procedure Laterality Date     athroplasty hip Bilateral     2003, 2006     OTHER SURGICAL HISTORY      athroplasty hip     STENT       aspirin (ASA) 81 MG chewable tablet  atorvastatin (LIPITOR) 40 MG tablet  blood glucose (NO BRAND SPECIFIED) test strip  carvedilol (COREG) 12.5 MG tablet  cyanocobalamin (VITAMIN B-12) 1000 MCG tablet  diclofenac (VOLTAREN) 1 % topical gel  doxazosin (CARDURA) 1 MG tablet  DULoxetine (CYMBALTA) 20 MG capsule  insulin glargine (LANTUS SOLOSTAR) 100 UNIT/ML pen  insulin lispro (HUMALOG) 100 UNIT/ML vial  insulin lispro (HUMALOG) 100 UNIT/ML vial  insulin pen needle (31G X 8 MM) 31G X 8 MM miscellaneous  levETIRAcetam (KEPPRA) 500 MG tablet  loratadine (CLARITIN) 10 mg tablet  melatonin 10 mg Tab  SYNTHROID 75 MCG tablet  Vitamin D3 (CHOLECALCIFEROL) 25 mcg (1000 units) tablet      Allergies   Allergen  Reactions     Bee Pollen Headache     Pollen Extract Headache     Family History  Family History   Problem Relation Age of Onset     Acute Myocardial Infarction Father      Heart Disease Maternal Grandmother      Dementia Maternal Grandmother      Myocardial Infarction Father      Dementia Paternal Grandmother      Heart Disease Paternal Grandmother      Social History   Social History     Tobacco Use     Smoking status: Former     Packs/day: 0.50     Years: 35.00     Pack years: 17.50     Types: Cigarettes     Quit date: 2018     Years since quittin.4     Smokeless tobacco: Never   Substance Use Topics     Alcohol use: Not Currently     Drug use: Not Currently      Past medical history, past surgical history, medications, allergies, family history, and social history were reviewed with the patient. No additional pertinent items.       Review of Systems   Unable to perform ROS: Patient unresponsive     A complete review of systems was attempted but limited due to altered mental status.    Physical Exam      Physical Exam  Constitutional:       Comments: Eyes open deviated to left; nonverbal to tactile or painful stimulti;  Jaw is clenched down; not moving eyes; rigid extremities;    HENT:      Head: Normocephalic and atraumatic.      Mouth/Throat:      Comments: Locked down jaw  Eyes:      Pupils: Pupils are equal, round, and reactive to light.      Comments: Pupils 2mm small, equal    Cardiovascular:      Rate and Rhythm: Normal rate and regular rhythm.      Pulses: Normal pulses.      Heart sounds: No murmur heard.    No gallop.   Pulmonary:      Effort: Pulmonary effort is normal. No respiratory distress.      Breath sounds: Normal breath sounds. No wheezing.   Abdominal:      General: There is no distension.      Tenderness: There is no abdominal tenderness. There is no guarding.   Musculoskeletal:      Comments: No spontaneous movement of extremities    Skin:     General: Skin is warm.   Neurological:       Mental Status: She is unresponsive.      Comments: No following commands;          ED Course   11:26 AM  The patient was seen and examined by Tavia Kim MD in Room ED03.      Procedures       Critical Care Addendum    My initial assessment, based on my review of nursing observations and discussion with EMS, established that Isabell Matthews has altered mental status, which requires immediate intervention, and therefore she is critically ill.     After the initial assessment, the care team consulted with stroke neurology to provide stabilization care. Due to the critical nature of this patient, I reassessed physical exam and mental status multiple times prior to her disposition.     Time also spent performing documentation, discussion with family to obtain medical information for decision making, reviewing test results and discussion with consultants.     Critical care time (excluding teaching time and procedures): 30 minutes.   The patient has stroke symptoms:         ED Stroke specific documentation           NIHSS PDF     Patient last known well time: 1:30am  ED Provider first to bedside at: 11:27am.   CT Results received at:     Thrombolytics:   Not given due to:   - outside of time window    If treating with thrombolytics: Ensure SBP<180 and DBP<105 prior to treatment with thrombolytics.  Administering thrombolytics after treatment with IV labetalol, hydralazine, or nicardipine is reasonable once BP control is established.    Endovascular Retrieval:  Not initiated due to absence of proximal vessel occlusion    National Institutes of Health Stroke Scale (Baseline)  Time Performed: 11:26     Score    Level of consciousness: (3)   Responds w/ reflex motor/autonomic effect or no response    LOC questions: (2)   Answers neither question correctly    LOC commands: (2)   Performs neither task correctly    Best gaze: (2)   Forced deviation    Visual: (0)   No visual loss    Facial palsy: (0)   Normal symmetrical  movements    Motor arm (left): (4)   No movement    Motor arm (right): (4)   No movement    Motor leg (left): (4)   No movement    Motor leg (right): (4)   No movement    Limb ataxia: (0)   Absent    Sensory: (2)   Severe to total sensory loss    Best language: (3)   Mute- global aphasia    Dysarthria: UN Intubated or other physical barrier:     Extinction and inattention: (2)   Profound yoselyn-inattention / extinction > one modality        Total Score:  32        Stroke Mimics were considered (including migraine headache, seizure disorder, hypoglycemia (or hyperglycemia), head or spinal trauma, CNS infection, Toxin ingestion and shock state (e.g. sepsis) .              The medical record was reviewed and interpreted.  Current labs reviewed and interpreted.  Previous labs reviewed and interpreted.       Results for orders placed or performed during the hospital encounter of 12/10/22   CT Head w/o Contrast     Status: None    Narrative    EXAM: CT HEAD W/O CONTRAST  12/10/2022 12:22 PM     HISTORY:  L gaze deviation and weakness and aphasia       COMPARISON:  Head CT 5/29/2022    TECHNIQUE: Using multidetector thin collimation helical acquisition  technique, axial, coronal and sagittal CT images from the skull base  to the vertex were obtained without intravenous contrast.   (topogram) image(s) also obtained and reviewed.    FINDINGS:  No acute intracranial hemorrhage, mass effect, or midline shift.  Scattered subcortical, periventricular areas of white matter  hypoattenuation. Multifocal old bilateral basal ganglia and thalamic  infarcts, unchanged since 5/29/2022. Moderate parenchymal volume loss  with compensatory ventricular dilatation. No CT findings of acute  hydrocephalus or infarct. Clear basal cisterns.    The bony calvaria and the bones of the skull base are normal. Polypoid  right maxillary mucosal retention cyst; otherwise the visualized  portions of the paranasal sinuses and mastoid air cells are  clear.  Bilateral pseudophakia.       Impression    IMPRESSION:   1. No acute intracranial pathology.   2. Stable moderate chronic small vessel ischemic disease and  parenchymal volume loss, greater than expected for patient age.  3. Multiple old bilateral basal ganglia and thalamic infarcts.    I have personally reviewed the examination and initial interpretation  and I agree with the findings.    GREG QUEVEDO MD         SYSTEM ID:  [id number]   CTA Head Neck with Contrast     Status: None    Narrative    EXAM: CTA HEAD NECK W CONTRAST, CT HEAD PERFUSION W CONTRAST   12/10/2022 12:22 PM     HISTORY:  L gaze deviation and aphasia. Acute stroke rule out LVO       COMPARISON:  CTA head and neck 5/3/2022.    TECHNIQUE:    HEAD and NECK CTA: During rapid bolus intravenous injection of  nonionic contrast material, axial images were obtained using thin  collimation multidetector helical technique from the base of the upper  aortic arch through the [place] of [place]. This CT angiogram data was  reconstructed at thin intervals with mild overlap. Images were sent to  the 3D workstation, and 3D reconstructions were obtained. The axial  source images, multiplanar reformations, 3D reconstructions in both  maximum intensity projection display and volume rendered models were  reviewed, with reconstructions performed by the technologist.    CT PERFUSION: Dynamic perfusion CT of the brain was performed at  multiple levels. Images were reviewed on the 3D workstation.    CONTRAST: Isovue 370    FINDINGS:  Head CTA demonstrates short segment moderate tapered narrowing of the  distal right M1 segment over a length of approximately 3-4 mm, more  conspicuous than on 5/3/2022. Also mild tapered narrowing of the  distal left M1 segment. No intracranial arterial aneurysm. Bilateral  carotid siphon calcifications.    Neck CTA demonstrates conventional aortic arch branching pattern and  patent great vessel origins. Aortic arch and branch  vessel  calcifications including carotid bulb calcifications. No stenosis of  the right internal carotid artery. Short segment mild narrowing of the  proximal left internal carotid artery with residual lumen diameter of  2.5 mm and normal distal diameter of 4.1 mm corresponding to  approximately 40% diameter stenosis. No vertebral artery stenosis.    CT perfusion images demonstrate no focal perfusion defect or mismatch.    No acute finding in the visualized neck soft tissues or in the  superior mediastinum/thorax.      Impression    IMPRESSION:    1. Head CTA demonstrates moderate tapered short segment narrowing of  the distal right M1 segment. Mild distal left M1 segment narrowing. No  intracranial arterial aneurysm.   2. Neck CTA demonstrates approximately 40% diameter stenosis of the  proximal right internal carotid artery. No left internal carotid  artery stenosis. No vertebral artery stenosis.  3. CT perfusion demonstrates no significant perfusion defect.    I have personally reviewed the examination and initial interpretation  and I agree with the findings.    GREG QUEVEDO MD         SYSTEM ID:  [id number]   CT Head Perfusion w Contrast     Status: None    Narrative    EXAM: CTA HEAD NECK W CONTRAST, CT HEAD PERFUSION W CONTRAST   12/10/2022 12:22 PM     HISTORY:  L gaze deviation and aphasia. Acute stroke rule out LVO       COMPARISON:  CTA head and neck 5/3/2022.    TECHNIQUE:    HEAD and NECK CTA: During rapid bolus intravenous injection of  nonionic contrast material, axial images were obtained using thin  collimation multidetector helical technique from the base of the upper  aortic arch through the [place] of [place]. This CT angiogram data was  reconstructed at thin intervals with mild overlap. Images were sent to  the 3D workstation, and 3D reconstructions were obtained. The axial  source images, multiplanar reformations, 3D reconstructions in both  maximum intensity projection display and volume  rendered models were  reviewed, with reconstructions performed by the technologist.    CT PERFUSION: Dynamic perfusion CT of the brain was performed at  multiple levels. Images were reviewed on the 3D workstation.    CONTRAST: Isovue 370    FINDINGS:  Head CTA demonstrates short segment moderate tapered narrowing of the  distal right M1 segment over a length of approximately 3-4 mm, more  conspicuous than on 5/3/2022. Also mild tapered narrowing of the  distal left M1 segment. No intracranial arterial aneurysm. Bilateral  carotid siphon calcifications.    Neck CTA demonstrates conventional aortic arch branching pattern and  patent great vessel origins. Aortic arch and branch vessel  calcifications including carotid bulb calcifications. No stenosis of  the right internal carotid artery. Short segment mild narrowing of the  proximal left internal carotid artery with residual lumen diameter of  2.5 mm and normal distal diameter of 4.1 mm corresponding to  approximately 40% diameter stenosis. No vertebral artery stenosis.    CT perfusion images demonstrate no focal perfusion defect or mismatch.    No acute finding in the visualized neck soft tissues or in the  superior mediastinum/thorax.      Impression    IMPRESSION:    1. Head CTA demonstrates moderate tapered short segment narrowing of  the distal right M1 segment. Mild distal left M1 segment narrowing. No  intracranial arterial aneurysm.   2. Neck CTA demonstrates approximately 40% diameter stenosis of the  proximal right internal carotid artery. No left internal carotid  artery stenosis. No vertebral artery stenosis.  3. CT perfusion demonstrates no significant perfusion defect.    I have personally reviewed the examination and initial interpretation  and I agree with the findings.    GREG QUEVEDO MD         SYSTEM ID:  [id number]   MR Brain w/o Contrast     Status: None    Narrative    EXAM: MR BRAIN W/O CONTRAST  12/10/2022 1:00 PM     HISTORY:  Concern for  stroke L gaze deviation, aphasia       COMPARISON:  Same day CT, CTA and CT perfusion, MRI 5/3/2022    TECHNIQUE: Sagittal and axial T1-weighted, coronal and axial  diffusion-weighted with ADC map, axial susceptibility weighted,   images of the brain were obtained without intravenous contrast    CONTRAST: None.    FINDINGS:  No increased signal on DWI/low signal on ADC. Periventricular, right  frontal predominant cerebral subcortical and deep white matter T2  hyperintensities compatible with sequela of chronic microvascular  ischemic disease. Also patchy T2 hyperintensity in the [place]. Moderate  parenchymal volume loss. No hydrocephalus. Region of susceptibility  effect within the left putamen and subinsular region. Additional  smaller foci of susceptibility effect in the right basal ganglia and  right thalamus, associated with old lacunar infarcts.    There is no mass effect, midline shift, or acute intracranial  hemorrhage.    No suspicious abnormality of the skull marrow signal. Right facet  polypoid mucosal retention cyst, otherwise clear paranasal sinuses.      Impression    IMPRESSION: No acute infarct or intracranial hemorrhage.    I have personally reviewed the examination and initial interpretation  and I agree with the findings.    GREG QUEVEDO MD         SYSTEM ID:  [id number]   [place] Draw *Canceled*     Status: None ()    Narrative    The following orders were created for panel order [place] Draw.  Procedure                               Abnormality         Status                     ---------                               -----------         ------                       Please view results for these tests on the individual orders.   [place] Draw *Canceled*     Status: None ()    Narrative    The following orders were created for panel order [place] Draw.  Procedure                               Abnormality         Status                     ---------                               -----------          ------                     Extra Blue Top Tube[[id number]]                                                         Extra Red Top Tube[[id number]]                                                          Extra Green Top (Lithium...[[id number]]                                                 Extra Purple Top Tube[[id number]]                                                       Extra Green Top (Lithium...[[id number]]                                                   Please view results for these tests on the individual orders.   Erythrocyte sedimentation rate auto     Status: Normal   Result Value Ref Range    Erythrocyte Sedimentation Rate 18 0 - 30 mm/hr   Asymptomatic COVID-19 Virus (Coronavirus) by PCR Nasopharyngeal     Status: Normal    Specimen: Nasopharyngeal; Swab   Result Value Ref Range    SARS CoV2 PCR Negative Negative    Narrative    Testing was performed using the Xpert Xpress SARS-CoV-2 Assay on the Cepheid Gene-Xpert Instrument Systems. Additional information about this Emergency Use Authorization (EUA) assay can be found via the Lab Guide. This test should be ordered for the detection of SARS-CoV-2 in individuals who meet SARS-CoV-2 clinical and/or epidemiological criteria as well as from individuals without symptoms or other reasons to suspect COVID-19. Test performance for asymptomatic patients has only been established in anterior nasal swab specimens. This test is for in vitro diagnostic use under the FDA EUA for laboratories certified under CLIA to perform high complexity testing. This test has not been FDA cleared or approved. A negative result does not rule out the presence of PCR inhibitors in the specimen or target RNA concentration below the limit of detection for the assay. The possibility of a false negative should be considered if the patient's recent exposure or clinical presentation suggests COVID-19. This test was validated by [facility]. These Laboratories are  certified under the Clinical Laboratory Improvement Amendments (CLIA) as qualified to perform high complexity testing.     Keppra (Levetiracetam) Level     Status: Normal   Result Value Ref Range    Keppra (Levetiracetam) Level 21.8 10.0 - 40.0  g/mL   Ammonia     Status: Normal   Result Value Ref Range    Ammonia 13 11 - 51 umol/L   Comprehensive metabolic panel     Status: Abnormal   Result Value Ref Range    Sodium 142 136 - 145 mmol/L    Potassium 4.4 3.4 - 5.3 mmol/L    Chloride 104 98 - 107 mmol/L    Carbon Dioxide (CO2) 24 22 - 29 mmol/L    Anion Gap 14 7 - 15 mmol/L    Urea Nitrogen 26.5 (H) 8.0 - 23.0 mg/dL    Creatinine 1.18 (H) 0.51 - 0.95 mg/dL    Calcium 9.7 8.8 - 10.2 mg/dL    Glucose 152 (H) 70 - 99 mg/dL    Alkaline Phosphatase 155 (H) 35 - 104 U/L    AST 24 10 - 35 U/L    ALT 21 10 - 35 U/L    Protein Total 7.3 6.4 - 8.3 g/dL    Albumin 4.1 3.5 - 5.2 g/dL    Bilirubin Total 0.4 <=1.2 mg/dL    GFR Estimate 51 (L) >60 mL/min/1.73m2   CRP inflammation     Status: Abnormal   Result Value Ref Range    CRP Inflammation 5.03 (H) <5.00 mg/L   INR     Status: Normal   Result Value Ref Range    INR 1.00 0.85 - 1.15   Magnesium     Status: Normal   Result Value Ref Range    Magnesium 1.7 1.7 - 2.3 mg/dL   Troponin T, High Sensitivity     Status: Abnormal   Result Value Ref Range    Troponin T, High Sensitivity 19 (H) <=14 ng/L   Glucose by meter     Status: Abnormal   Result Value Ref Range    GLUCOSE BY METER POCT 157 (H) 70 - 99 mg/dL   iStat INR, POCT     Status: Abnormal   Result Value Ref Range    INR POCT 1.1 (L) 2.0 - 3.0   Creatinine POCT     Status: Abnormal   Result Value Ref Range    Creatinine POCT 1.3 (H) 0.5 - 1.0 mg/dL    GFR, ESTIMATED POCT 46 (L) >60 mL/min/1.73m2   iStat Gases Electrolytes ICA Glucose Venous, POCT     Status: Abnormal   Result Value Ref Range    CPB Applied No     Hematocrit POCT 39 35 - 47 %    Calcium, Ionized Whole Blood POCT 5.0 4.4 - 5.2 mg/dL    Glucose Whole Blood POCT  147 (H) 70 - 99 mg/dL    Bicarbonate Venous POCT 29 (H) 21 - 28 mmol/L    Hemoglobin POCT 13.3 11.7 - 15.7 g/dL    Potassium POCT 4.2 3.4 - 5.3 mmol/L    Sodium POCT 141 133 - 144 mmol/L    pCO2 Venous POCT 54 (H) 40 - 50 mm Hg    pO2 Venous POCT 15 (L) 25 - 47 mm Hg    pH Venous POCT 7.33 7.32 - 7.43    O2 Sat, Venous POCT 17 (L) 94 - 100 %   CBC with platelets and differential     Status: None   Result Value Ref Range    WBC Count 8.0 4.0 - 11.0 10e3/uL    RBC Count 4.48 3.80 - 5.20 10e6/uL    Hemoglobin 13.0 11.7 - 15.7 g/dL    Hematocrit 39.8 35.0 - 47.0 %    MCV 89 78 - 100 fL    MCH 29.0 26.5 - 33.0 pg    MCHC 32.7 31.5 - 36.5 g/dL    RDW 13.3 10.0 - 15.0 %    Platelet Count 185 150 - 450 10e3/uL    % Neutrophils 86 %    % Lymphocytes 11 %    % Monocytes 3 %    % Eosinophils 0 %    % Basophils 0 %    % Immature Granulocytes 0 %    NRBCs per 100 WBC 0 <1 /100    Absolute Neutrophils 6.9 1.6 - 8.3 10e3/uL    Absolute Lymphocytes 0.9 0.8 - 5.3 10e3/uL    Absolute Monocytes 0.2 0.0 - 1.3 10e3/uL    Absolute Eosinophils 0.0 0.0 - 0.7 10e3/uL    Absolute Basophils 0.0 0.0 - 0.2 10e3/uL    Absolute Immature Granulocytes 0.0 <=0.4 10e3/uL    Absolute NRBCs 0.0 10e3/uL   iStat Troponin, POCT     Status: Normal   Result Value Ref Range    TROPPC POCT 0.01 <=0.12 ug/L   Lactic acid whole blood     Status: Normal   Result Value Ref Range    Lactic Acid 1.9 0.7 - 2.0 mmol/L   Lactic Acid STAT     Status: Normal   Result Value Ref Range    Lactic Acid 1.3 0.7 - 2.0 mmol/L   EKG 12-lead, tracing only     Status: None (Preliminary result)   Result Value Ref Range    Systolic Blood Pressure  mmHg    Diastolic Blood Pressure  mmHg    Ventricular Rate 113 BPM    Atrial Rate 113 BPM    VT Interval 134 ms    QRS Duration 90 ms     ms    QTc 499 ms    P Axis 28 degrees    R AXIS 51 degrees    T Axis 69 degrees    Interpretation ECG Sinus tachycardia  Otherwise normal ECG      ABO/Rh type and screen     Status: None ()     Narrative    The following orders were created for panel order ABO/Rh type and screen.  Procedure                               Abnormality         Status                     ---------                               -----------         ------                     Adult Type and Screen[[id number]]                                                         Please view results for these tests on the individual orders.   CBC with platelets differential     Status: None    Narrative    The following orders were created for panel order CBC with platelets differential.  Procedure                               Abnormality         Status                     ---------                               -----------         ------                     CBC with platelets and d...[[id number]]                      Final result                 Please view results for these tests on the individual orders.     Medications   LORazepam (ATIVAN) 2 MG/ML injection (  Canceled Entry 12/10/22 1136)   0.9% sodium chloride BOLUS (1,000 mLs Intravenous New Bag 12/10/22 1429)     Followed by   sodium chloride 0.9% infusion ( Intravenous New Bag 12/10/22 1536)   0.9% sodium chloride BOLUS (1,000 mLs Intravenous New Bag 12/10/22 1538)     Followed by   sodium chloride 0.9% infusion (has no administration in time range)   LORazepam (ATIVAN) injection 1 mg (1 mg Intravenous Given 12/10/22 1136)   iopamidol (ISOVUE-370) solution 115 mL (115 mLs Intravenous Given 12/10/22 1141)   sodium chloride (PF) 0.9% PF flush 90 mL (90 mLs Intravenous Given 12/10/22 1141)   LORazepam (ATIVAN) injection 2 mg (2 mg Intravenous Given 12/10/22 1225)   levETIRAcetam (KEPPRA) intermittent infusion 1,000 mg (0 mg Intravenous Stopped 12/10/22 1536)   levETIRAcetam (KEPPRA) intermittent infusion 1,000 mg (1,000 mg Intravenous New Bag 12/10/22 1425)   labetalol (NORMODYNE/TRANDATE) injection 10 mg (10 mg Intravenous Given 12/10/22 1425)   labetalol (NORMODYNE/TRANDATE) injection 10  mg (10 mg Intravenous Given 12/10/22 1536)          EKG Interpretation:      Interpreted by Tavia Kim MD  Time reviewed:1226  Symptoms at time of EKG: AMS   Rhythm: normal sinus   Rate: 113  Axis: NORMAL  Ectopy: none  Conduction: normal  ST Segments/ T Waves: No ST-T wave changes  Q Waves: none  Comparison to prior: No old EKG available    Clinical Impression: sinus tachycardia         No results found for any visits on 12/10/22.  Medications - No data to display     Assessments & Plan (with Medical Decision Making)   Patient is a 62-year-old female that was brought to the ER by EMS for altered mental status.  Patient was found contracted with eyes deviated to the left this morning when he was found by the daughter.  Per history obtained from daughter patient has baseline dementia and high needs due to previous strokes and head bleeds.  Says that she normally can talk and is oriented.  Patient this morning was found in a rigid position and unable to answer any questions.  Initial presentation and was worried about an acute head bleed, stroke versus status epilepticus.  Stroke code was called.  Patient was sent to CT and had a CT head done as well as a CTA.  Patient was seen by the stroke team Dr. Irizarry who said that patient had no head bleeds but they would perform a stat MRI as well.  Patient initially was given 1 dose of IV lorazepam without significant improvement.  Patient was subsequently given another dose of 2 mg of Ativan and now she no longer rigid and no longer has eyes deviated to the left.  Patient still unable to talk or give a history but she no longer appears in a rigid seizure-like body frame.  Stroke neurology recommends the patient have an EEG and that the case be discussed with the neurology team.  I therefore contacted the neurology team and waiting to hear from them.  Patient's labs have been stable.  Patient's EKG showed sinus tachycardia but otherwise no findings.  Patient has  received 2 doses of IV labetalol now her blood pressures are in the 170s.  Patient with no clear source of infection.  Awaiting neurology recommendations for further care.  Patient's been able to maintain her airway and is satting in the high 90s.  Patient with no concern for septic shock or sepsis.  Unknown cause of this severe altered mental status except for possible seizures.  It appears the patient likely is now postictal which is why she is sleepy.  Patient will likely need to be admitted to the hospital to the neurology service for further care    I have reviewed the nursing notes. I have reviewed the findings, diagnosis, plan and need for follow up with the patient.    New Prescriptions    No medications on file       Final diagnoses:   Altered mental status, unspecified altered mental status type - concern for status epilepticus   Intractable epilepsy with status epilepticus, unspecified epilepsy type (H)     ICatherine, am serving as a trained medical scribe to document services personally performed by Tavia Kim MD, based on the provider's statements to me.      ITavia MD, was physically present and have reviewed and verified the accuracy of this note documented by Catherine Zuniga.     --  Tavia Kim MD  [facility] EMERGENCY DEPARTMENT  12/10/2022     Tavia Kim MD  12/10/22 8725

## 2022-12-10 NOTE — PROGRESS NOTES
Stroke Code Nurse-Responder Note    Arrival Time to Stroke Code: 460433  Stroke Code Team interventions:   - De-escalated at 1301 by Eveline Sanabria MD    ED/Bedside Nurse providing handoff: Maryjo LUU ED RN     Time left for CT: In CT at 1136 in MRI at 1245    Time arrived to next location (ED/Unit/IR): Back to ED at 1305    ED/Bedside Nurse given handoff (name/time): Maryjo [facility] ED RN at 1305    Ashley Maldonado RN

## 2022-12-10 NOTE — CONSULTS
[facility]  Neurology [facility]     Patient Name:  Isabell Matthews  MRN:  [id number]    :  1960  Date of Service:  December 10, 2022  Primary care provider:  Bony Castaneda      Neurology consultation service was asked to see Isabell Matthews by Dr. Kim to evaluate for concern for seizure.    Chief Complaint:  Concern for seizure    History of Present Illness:   Isabell Matthews is a 62 year old female with history of vascular dementia complicated by a right frontal punctate infarct in 2022 as well as a history of a right periventricular IPH who presented to the emergency department brought in by ambulance with concerns for stroke.  A stroke code was called due to left sided gaze deviation as well as rigidity.  Her last known normal was 0130 on the night of 12/10/2022.  CT and CTA as well as hyperacute MRI were negative for acute stroke.  The patient does also have a remote history of a seizure disorder for which she is on levetiracetam 500 mg twice daily in the outpatient setting.  Due to an unremarkable work-up for stroke, it was thought that the patient was postictal from a presumed seizure.  EEG was hooked up, and the patient was given 2 g of IV levetiracetam as well as 3 mg of IV lorazepam total.  Her left gaze deviation did improve after the administration of the above aforementioned AEDs.    The patient's daughter Maria Guadalupe is at the patient's bedside.  She states that 3 days ago the patient had multiple teeth extracted due to a presumed infected root.  At the patient's baseline she can walk very short distances with a walker but is mostly immobile and requires mobilization with a wheelchair.  Patient's caregiver is her daughter, there is no concern for missed doses of her prehospitalization AED.  Patient does also have some baseline spasticity and cognitive issues secondary to her vascular dementia, however she is able to converse limitedly at baseline and she is also  able to move all 4 extremities limitedly at baseline, and the current presentation is a marked decrease from her baseline.    ROS  A comprehensive ROS was performed and pertinent findings were included in HPI.     PMH  Past Medical History:   Diagnosis Date    Chronic pain     Coronary atherosclerosis 2016    Essential hypertension     Ex-cigarette smoker     quit 2018 over 35 years    Ex-cigarette smoker     Hyperlipidemia     Hypothyroid     Seizures (H)     Stroke (H)     Vascular dementia (H)      Past Surgical History:   Procedure Laterality Date    athroplasty hip Bilateral     ,     OTHER SURGICAL HISTORY      athroplasty hip    STENT         Medications   I have personally reviewed the patient's medication list.     Allergies  I have personally reviewed the patient's allergy list.     Social History    Social History     Socioeconomic History    Marital status:      Spouse name: None    Number of children: None    Years of education: None    Highest education level: None   Tobacco Use    Smoking status: Former     Packs/day: 0.50     Years: 35.00     Pack years: 17.50     Types: Cigarettes     Quit date: 2018     Years since quittin.4    Smokeless tobacco: Never   Substance and Sexual Activity    Alcohol use: Not Currently    Drug use: Not Currently    Sexual activity: Not Currently   Social History Narrative    ** Merged History Encounter **         Recently moved to [facility] with Daughter Charli who is her pca         Family History      Family History   Problem Relation Age of Onset    Acute Myocardial Infarction Father     Heart Disease Maternal Grandmother     Dementia Maternal Grandmother     Myocardial Infarction Father     Dementia Paternal Grandmother     Heart Disease Paternal Grandmother          Physical Examination   Vitals: BP (!) 204/101   Pulse 113   Temp (!) 96.5  F (35.8  C) (Oral)   Resp 18   SpO2 97%   General: Lying in bed, NAD  Head: NC/AT  Eyes: no  icterus, op pink and moist  Cardiac: RRR. Extremities warm, no edema.   Respiratory: non-labored on RA  GI: S/NT/ND  Skin: No rash or lesion on exposed skin  Psych: Mood pleasant, affect congruent  Neuro:  Mental status: Patient is somnolent, does not arouse to loud voice or loud noise, does not follow commands, does not speak.  Cranial nerves: There is a left gaze preference that does improve with roving eye movements.  Pupils are reactive and equal bilaterally.  There does not appear to be any facial asymmetry on gross observation.  Blink to threat is not intact bilaterally when eyes are open.  Motor/sensory: Marked rigidity versus paratonia in all 4 extremities.  The patient does withdrawal to noxious stimuli in the bilateral lower extremities but does not withdrawal to noxious stimuli in the bilateral upper extremities.  Reflexes: Normo-reflexic and symmetric biceps, brachioradialis, patellae, and achilles without brisk reflexes or evidence of hyper reflexia. Both toes upgoing.      Investigations   I have personally reviewed pertinent labs, tests, and radiological imaging. Discussion of notable findings is included under Impression.     Was patient transferred from outside hospital?   No    Impression  This is a 62-year-old woman with a complicated past medical history including a right frontal punctate infarct in April 2022 as well as in age indeterminant left IPH (seen on GRE sequencing on MRI) who presented as a stroke code due to decreased responsiveness as well as a left gaze preference.  CT and CTA were negative and hyperacute MRI was obtained which was also negative for acute stroke.  It was thought that the patient was either postictal or having subclinical seizures.  Patient is on baseline levetiracetam 500 mg twice daily, and a level was obtained in the ED that was normal.  There is no concern for missed doses because the patient's daughter administers all her medications.  The patient was given 2 g  of IV levetiracetam as well as a total of 3 mg of IV Ativan in the emergency department.  This seemed to improve her left gaze preference, however the patient does remain somnolent and unresponsive.  Exam noted above, is notable for marked rigidity in all 4 extremities.  She does have a left gaze preference but this is spontaneously is overcome with roving eye movements.  This certainly possible that the patient had a provoked seizure in the setting of an acute illness of unknown etiology currently perhaps in the setting of a recent tooth extraction in the outpatient setting.  Continuous video EEG is currently running to rule out a postictal state versus nonconvulsive status epilepticus.    Recommendations  -Continuous vEEG monitoring  -Agree with Keppra 2g x1 IV load  -Agree with previous doses of ativan  -Infectious work up with BCX, UA, Influenza  -COVID-19 negative    Problem List on Admission:   -Seizure & post ictal state   - Encephalopathy   - UTI (culture pending)   - CKD I  - Diabetes type 2  - Anemia  - Dementia     Thank you for involving Neurology in the care of Isabell Matthews.  Please do not hesitate to call with questions/concerns (consult pager [phone]).      Patient was discussed with Dr. Dk Walker, DO  Resident Physician, PGY-2  Department of Neurology    Dragon disclaimer: This documentation was completed with the aid of dictation software.  Please note that there may be some inconsistencies due to software errors.  Errors are corrected in real time, however if there is a remaining error, please do not hesitate to reach out for clarification.

## 2022-12-10 NOTE — CONSULTS
"[facility]    Stroke Consult Note    Reason for Consult: Stroke Code     Chief Complaint: Stroke Symptoms (Unresponsive, gazing to left.)      HPI  Isabell Matthews is a 62 year old female with history of lacunar infarcts, seizures, T1DM, HTN, acquired hypothyroidism, CKD IIIb, HLD, fibromyalgia, and possible vascular dementia who presents as a stroke code due to L gaze deviation and aphasia.     Last known normal 1:30 AM this morning when daughter saw her conversing at her baseline right before going to bed. Daughter found her abnormal at 10:45AM with L gaze deviation and the inability to speak. No obvious loss of bowel or bladder this morning but daughter states patient is incontinent at baseline.    At baseline, patient can walk very short distances with a walker but usually requires a wheelchair to travel long distances. Patient lives with daughter who is her caretaker. Daughter states patient has history of vascular dementia that is \"moderate\" - she is oriented to self but not always location. On her good days, she can remember her home address. This morning at 1:30, she was asking the  Patient had three teeth extracted this past Thursday 12/8/22. However, she has not endorsed any recent infectious symptoms to her daughter - that being said, daughter states that patient tends to keep her symptoms to herself. Daughter helps manage the patient's medications and confirms that patient has been compliant with her medications.    In the ED, she received 1 mg ativan.      Patient was taken for urgent hyperacute MRI. Prior to transfer to hyperacute MRI, patient was given another 2 mg ativan and started on 1g keppra load.    Patient had also been hypertensive in the ED with BP's > 200's     Social History:   -Patient vapes daily, prior history of cigarette use   -No alcohol use   -No illicit drug use     Imaging Findings  CT Head: no acute intracranial pathology. R " "frontotemporal atrophy > L. Chronic b/l lacunar infarcts     CTA head and neck: No large vessel occlusion. However, with multivessel stenosis (R PCA, b/l MCA)    CTP: no perfusion abnormalities     Intravenous Thrombolysis  Not given due to:   - No evidence of stroke on hyperacute MRI     Endovascular Treatment  Not initiated due to absence of proximal vessel occlusion    Impression   #Concern for generalized tonic status epilepticus  Patient presented as a possible \"wake up stroke\". NIHSS 25 in setting of global aphasia, mute with the inability to follow commands, L gaze deviation, drift in all extremities but legs are antigravity to noxious, grimaces to noxious in all extremities. CT without acute intracranial pathology. CTA without large vessel occlusion, thus not a candidate for thrombectomy. CTP without perfusion deficits. Patient was taken for hyperacute MRI which was without evidence of acute or subacute strokes. Prior MRI SWI sequence revealed micro-hemorrhages in bilateral thalami, chuck, and one in the R parietal lobe possibly related to prior hypertensive microhemorrhages. Given left gaze deviation and history of seizures, there is strong concern for tonic status epilepticus s/p 1g keppra load and 3mg ativan total given in the ED, which appeared to improve her symptoms (now with intermittent roving eye movements instead of forced L gaze and toes no longer in a flexed position).        Recommendations  -Toxic metabolic, infectious workup per ED including UA   -BP management per ED, recommend goal of < BP by 20 - 25% in the next 24 hours   -EEG (ordered and messaged EEG techs for you)  -General neurology consult for seizure management     Patient Follow-up     -No follow up with stroke neurology required     Thank you for this consult. No further stroke evaluation is recommended, so we will sign off. Please contact us with any additional questions.     The patient was discussed with Stroke Staff is  " Russell Sanabria MD  Neurology Resident  [facility] [id number]  ______________________________________________________    Clinically Significant Risk Factors Present on Admission                  # Hypertension: home medication list includes antihypertensive(s)   # Dementia: noted on problem list         # Coma: based on GCS score of <8  # CKD, Stage 3b (GFR 30-44): Will monitor and treat as appropriate  - last Cr =  1.3 mg/dL (Ref range: 0.5 - 1.0 mg/dL)  - last GFR = 46 mL/min/1.73m2 (Ref range: >60 mL/min/1.73m2)        Past Medical History   Past Medical History:   Diagnosis Date     Chronic pain      Coronary atherosclerosis 6/14/2016     Essential hypertension      Ex-cigarette smoker     quit 7/2018 over 35 years     Ex-cigarette smoker      Hyperlipidemia      Hypothyroid      Seizures (H)      Stroke (H)      Vascular dementia (H)      Past Surgical History   Past Surgical History:   Procedure Laterality Date     athroplasty hip Bilateral     2003, 2006     OTHER SURGICAL HISTORY      athroplasty hip     STENT       Medications   Home Meds  Prior to Admission medications    Medication Sig Start Date End Date Taking? Authorizing Provider   aspirin (ASA) 81 MG chewable tablet Take 1 tablet (81 mg) by mouth daily 4/8/22 5/8/22  Julio C Hay MD   atorvastatin (LIPITOR) 40 MG tablet Take 1 tablet (40 mg) by mouth daily 10/31/22   Bony Castaneda MD   blood glucose (NO BRAND SPECIFIED) test strip Use to test blood sugar 4-5 times daily or as directed. 1/14/22   Bony Castaneda MD   carvedilol (COREG) 12.5 MG tablet Take 1 tablet (12.5 mg) by mouth 2 times daily (with meals) 10/31/22   Bony Castaneda MD   cyanocobalamin (VITAMIN B-12) 1000 MCG tablet Take 1 tablet (1,000 mcg) by mouth daily 9/2/20   Bony Castaneda MD   diclofenac (VOLTAREN) 1 % topical gel Apply 2 g topically 4 times daily as needed for moderate pain 2/25/21   Bony Castaneda MD   doxazosin (CARDURA) 1 MG tablet Take 1  tablet (1 mg) by mouth At Bedtime 10/31/22   Bony Castaneda MD   DULoxetine (CYMBALTA) 20 MG capsule Take 1 capsule (20 mg) by mouth daily 10/31/22   Bony Castaneda MD   insulin glargine (LANTUS SOLOSTAR) 100 UNIT/ML pen INJECT 20 UNITS SUBCUTANEOUS AT BEDTIME 10/31/22   Bony Castaneda MD   insulin lispro (HUMALOG) 100 UNIT/ML vial Inject 1-10 Units Subcutaneous 3 times daily (before meals) Correction Scale - HIGH INSULIN RESISTANCE DOSING: Do Not give Correction Insulin if Pre-Meal BG less than 140. For Pre-Meal  - 164 give 1 unit. For Pre-Meal  - 189 give 2 units. For Pre-Meal  - 214 give 3 units. For Pre-Meal  - 239 give 4 units. For Pre-Meal  - 264 give 5 units. For Pre-Meal  - 289 give 6 units. For Pre-Meal  - 314 give 7 units. For Pre-Meal  - 339 give 8 units. For Pre-Meal  - 364 give 9 units. For Pre-Meal BG greater than or equal to 365 give 10 units.To be given with prandial insulin, and based on pre-meal blood glucose. Notify provider if glucose greater than or equal to 350 mg/dL after administration of correction dose. If given at mealtime, administer within 30 minutes of start of meal. 5/14/22 6/13/22  Carmen Driscoll MD   insulin lispro (HUMALOG) 100 UNIT/ML vial Inject 1-7 Units Subcutaneous At Bedtime HIGH INSULIN RESISTANCE DOSING: Do Not give Bedtime Correction Insulin if BG less than 200. For  - 224 give 1 units. For  - 249 give 2 units. For  - 274 give 3 units. For  - 299 give 4 units. For  - 324 give 5 units. For  - 349 give 6 units. For BG greater than or equal to 350 give 7 units. Notify provider if glucose greater than or equal to 350 mg/dL after administration of correction dose. If given at mealtime, administer within 30 minutes of start of meal. 5/14/22 6/13/22  Carmen Driscoll MD   insulin pen needle (31G X 8 MM) 31G X 8 MM miscellaneous Use 4 pen needles  daily or as directed. 20   Bony Castaneda MD   levETIRAcetam (KEPPRA) 500 MG tablet Take 1 tablet (500 mg) by mouth 2 times daily 10/31/22   Bony Castaneda MD   loratadine (CLARITIN) 10 mg tablet [LORATADINE (CLARITIN) 10 MG TABLET] Take 10 mg by mouth daily. 21   Provider, Historical   melatonin 10 mg Tab Take 10 mg by mouth nightly as needed 21   Provider, Historical   SYNTHROID 75 MCG tablet Take 1 tablet (75 mcg) by mouth daily 22   Bony Castaneda MD   Vitamin D3 (CHOLECALCIFEROL) 25 mcg (1000 units) tablet Take 1 tablet (25 mcg) by mouth daily 20   Bony Castaneda MD       Scheduled Meds    sodium chloride 0.9%  1,000 mL Intravenous Once     levETIRAcetam  1,000 mg Intravenous Once     LORazepam         LORazepam  2 mg Intravenous Once       Infusion Meds    sodium chloride         PRN Meds      Allergies   Allergies   Allergen Reactions     Bee Pollen Headache     Pollen Extract Headache     Family History   Family History   Problem Relation Age of Onset     Acute Myocardial Infarction Father      Heart Disease Maternal Grandmother      Dementia Maternal Grandmother      Myocardial Infarction Father      Dementia Paternal Grandmother      Heart Disease Paternal Grandmother      Social History   Social History     Tobacco Use     Smoking status: Former     Packs/day: 0.50     Years: 35.00     Pack years: 17.50     Types: Cigarettes     Quit date: 2018     Years since quittin.4     Smokeless tobacco: Never   Substance Use Topics     Alcohol use: Not Currently     Drug use: Not Currently       Review of Systems   Unable to obtain as patient is encephalopathic - history obtained from daughter per above        PHYSICAL EXAMINATION  Temp:  [96.3  F (35.7  C)-96.5  F (35.8  C)] 96.5  F (35.8  C)  Pulse:  [134] 134  Resp:  [17] 17  BP: (193)/(87) 193/87  SpO2:  [97 %] 97 %     General Exam  General:  Patient laying in bed, unresponsive to surrounding  stimuli  HEENT:  Normocephalic, atraumatic, unable to assess tongue   Cardio: Tachycardic per vitals   Pulmonary:  No respiratory distress, breathing comfortably on room air   Abdomen:  Non-distended   Extremities: no significant edema    Skin:  Warm, dry, intact     Neuro Exam  Mental Status:  Comatose, global aphasia - mute, not following any commands   Cranial Nerves:  PERRL, unable to fully assess visual fields given inability to open eyes fully to command, initially with forced left gaze deviation, later with roving eye movements, face grossly symmetric  Motor:  Increased tone in all extremities, patient appears to be contracted in all extremities (this is abnormal and not her baseline per daughter) - arms and legs in flexed position (elbow, wrist, knee, ankle, and toe flexion), no clonic movements, Drift in all extremities to bed    Reflexes: Up-going toes bilaterally  Sensory:  Grimaces to noxious stimuli in all extremities, specifically withdraws in BLE's antigravity to noxious stimuli    Coordination:  Unable to assess   Station/Gait:  Unable to assess     Dysphagia Screen  Failed screening - Strict NPO pending SLP evaluation    Stroke Scales    NIHSS  1a. Level of Consciousness 2-->Not alert, requires repeated stimulation to attend, or is obtunded and requires strong or painful stimulation to make movements (not stereotyped)   1b. LOC Questions 2-->Answers neither question correctly   1c. LOC Commands 2-->Performs neither task correctly   2.   Best Gaze 2-->Forced deviation, or total gaze paresis not overcome by the oculocephalic maneuver   3.   Visual 0-->No visual loss   4.   Facial Palsy 0-->Normal symmetrical movements   5a. Motor Arm, Left 4-->No movement   5b. Motor Arm, Right 4-->No movement   6a. Motor Leg, Left 3-->No effort against gravity, leg falls to bed immediately   6b. Motor Leg, right 3-->No effort against gravity, leg falls to bed immediately   7.   Limb Ataxia 0-->Absent   8.   Sensory  0-->Normal, no sensory loss   9.   Best Language 3-->Mute, global aphasia, no usable speech or auditory comprehension   10. Dysarthria 0-->Normal   11. Extinction and Inattention  0-->No abnormality   Total 25 (12/10/22 1242)       Modified Lanier Score (Pre-morbid)  5-Severe disability; bedridden, incontinent and requiring constant nursing care and attention    Imaging  I personally reviewed all imaging; relevant findings per HPI.     Lab Results Data   CBC  Recent Labs   Lab 12/10/22  1138 12/10/22  1133   WBC  --  8.0   RBC  --  4.48   HGB 13.3 13.0   HCT 39 39.8   PLT  --  185     Basic Metabolic Panel    Recent Labs   Lab 12/10/22  1138 12/10/22  1133 12/10/22  1131     --   --    POTASSIUM 4.2  --   --    CR  --  1.3*  --    *  --  157*     Liver Panel  No results for input(s): PROTTOTAL, ALBUMIN, BILITOTAL, ALKPHOS, AST, ALT, BILIDIRECT in the last 168 hours.  INR    Recent Labs   Lab Test 12/10/22  1132 05/03/22  0229 04/10/22  1441   INR 1.1* 1.3* 0.93      Lipid Profile    Recent Labs   Lab Test 04/06/22  0014 08/02/21  1009   CHOL 99 146   HDL 34* 39*   LDL 39 75   TRIG 129 158*     A1C    Recent Labs   Lab Test 10/31/22  1721 05/03/22  0227 04/05/22  2115   A1C 10.3* 8.9* 8.8*     Troponin  No results for input(s): CTROPT, TROPONINIS, TROPONINI, GHTROP in the last 168 hours.       Stroke Code Data Data   Stroke Code Data  (for stroke code without tele)  Stroke code activated 12/10/22   1128   First stroke provider response 12/10/22   1132   Last known normal 12/10/22   0130   Time of discovery   (or onset of symptoms) 12/10/22   1045   Head CT read by Stroke Neuro Dr/Provider 12/10/22   1210   Was stroke code de-escalated? Yes 12/10/22 1301

## 2022-12-10 NOTE — H&P
Please use the consult note written on 12/10/22 as the patient's H&P documentation.    FEN: NPO for now until deemed safe to do so  Malnutrition: Patient does not meet two of the above criteria necessary for diagnosing malnutrition  PPx: Lovenox (ordered)  Code: Full code, per chart review.    Discussed with Dr. Dk Walker DO  Resident Physician, PGY-2  Department of Neurology

## 2022-12-10 NOTE — ED TRIAGE NOTES
Pt was found by daughter this morning (around 1100), unresponsive, arms and legs contracted, and gazing to the left. LKW 0130. POCT glucose 157. Pt is hypertensive and tachycardic, SpO2 >94% on RA. Pt does have hx of stroke and hx of seizures. Per medication list review, pt has keppra and aspirin prescribed. Tier 2 stroke code activated. IV access obtained, istats running, pt to CT on monitor.

## 2022-12-10 NOTE — PROGRESS NOTES
VEEG monitoring preliminary results:    VEEG has been running for over one hour.  EEG showed severe generalized slowing with mostly delta activities.  No epileptiform activities, no clinical or electrographic seizures were observed.  Findings are consistent with severe diffuse encephalopathy. Etiology is non-specific, can be post-ictal state among other causes.      Penelope Tamez MD  Neurology

## 2022-12-11 ENCOUNTER — APPOINTMENT (OUTPATIENT)
Dept: ULTRASOUND IMAGING | Facility: CLINIC | Age: 62
DRG: 101 | End: 2022-12-11
Attending: STUDENT IN AN ORGANIZED HEALTH CARE EDUCATION/TRAINING PROGRAM
Payer: MEDICARE

## 2022-12-11 ENCOUNTER — APPOINTMENT (OUTPATIENT)
Dept: GENERAL RADIOLOGY | Facility: CLINIC | Age: 62
DRG: 101 | End: 2022-12-11
Attending: STUDENT IN AN ORGANIZED HEALTH CARE EDUCATION/TRAINING PROGRAM
Payer: MEDICARE

## 2022-12-11 ENCOUNTER — APPOINTMENT (OUTPATIENT)
Dept: NEUROLOGY | Facility: CLINIC | Age: 62
DRG: 101 | End: 2022-12-11
Payer: MEDICARE

## 2022-12-11 LAB
ABO/RH(D): NORMAL
AMMONIA PLAS-SCNC: 26 UMOL/L (ref 10–50)
ANION GAP SERPL CALCULATED.3IONS-SCNC: 14 MMOL/L (ref 7–15)
ANTIBODY SCREEN: NEGATIVE
BACTERIA UR CULT: ABNORMAL
BASE EXCESS BLDV CALC-SCNC: -3.7 MMOL/L (ref -7.7–1.9)
BUN SERPL-MCNC: 24 MG/DL (ref 8–23)
CALCIUM SERPL-MCNC: 8.5 MG/DL (ref 8.8–10.2)
CHLORIDE SERPL-SCNC: 106 MMOL/L (ref 98–107)
CREAT SERPL-MCNC: 1.4 MG/DL (ref 0.51–0.95)
DEPRECATED HCO3 PLAS-SCNC: 19 MMOL/L (ref 22–29)
ERYTHROCYTE [DISTWIDTH] IN BLOOD BY AUTOMATED COUNT: 13.7 % (ref 10–15)
GFR SERPL CREATININE-BSD FRML MDRD: 42 ML/MIN/1.73M2
GLUCOSE BLDC GLUCOMTR-MCNC: 176 MG/DL (ref 70–99)
GLUCOSE BLDC GLUCOMTR-MCNC: 244 MG/DL (ref 70–99)
GLUCOSE BLDC GLUCOMTR-MCNC: 251 MG/DL (ref 70–99)
GLUCOSE BLDC GLUCOMTR-MCNC: 257 MG/DL (ref 70–99)
GLUCOSE BLDC GLUCOMTR-MCNC: 273 MG/DL (ref 70–99)
GLUCOSE BLDC GLUCOMTR-MCNC: 300 MG/DL (ref 70–99)
GLUCOSE BLDC GLUCOMTR-MCNC: 301 MG/DL (ref 70–99)
GLUCOSE BLDC GLUCOMTR-MCNC: 311 MG/DL (ref 70–99)
GLUCOSE SERPL-MCNC: 325 MG/DL (ref 70–99)
HCO3 BLDV-SCNC: 21 MMOL/L (ref 21–28)
HCT VFR BLD AUTO: 33.8 % (ref 35–47)
HGB BLD-MCNC: 10.7 G/DL (ref 11.7–15.7)
LACTATE SERPL-SCNC: 2 MMOL/L (ref 0.7–2)
LACTATE SERPL-SCNC: 2.3 MMOL/L (ref 0.7–2)
MAGNESIUM SERPL-MCNC: 1.6 MG/DL (ref 1.7–2.3)
MCH RBC QN AUTO: 28.5 PG (ref 26.5–33)
MCHC RBC AUTO-ENTMCNC: 31.7 G/DL (ref 31.5–36.5)
MCV RBC AUTO: 90 FL (ref 78–100)
O2/TOTAL GAS SETTING VFR VENT: 21 %
PCO2 BLDV: 34 MM HG (ref 40–50)
PH BLDV: 7.39 [PH] (ref 7.32–7.43)
PLATELET # BLD AUTO: 162 10E3/UL (ref 150–450)
PO2 BLDV: 49 MM HG (ref 25–47)
POTASSIUM SERPL-SCNC: 3.7 MMOL/L (ref 3.4–5.3)
RBC # BLD AUTO: 3.75 10E6/UL (ref 3.8–5.2)
SODIUM SERPL-SCNC: 139 MMOL/L (ref 136–145)
SPECIMEN EXPIRATION DATE: NORMAL
T3FREE SERPL-MCNC: 1.1 PG/ML (ref 2–4.4)
TSH SERPL DL<=0.005 MIU/L-ACNC: 0.55 UIU/ML (ref 0.3–4.2)
VIT B12 SERPL-MCNC: 419 PG/ML (ref 232–1245)
WBC # BLD AUTO: 6.9 10E3/UL (ref 4–11)

## 2022-12-11 PROCEDURE — 99233 SBSQ HOSP IP/OBS HIGH 50: CPT | Mod: GC | Performed by: STUDENT IN AN ORGANIZED HEALTH CARE EDUCATION/TRAINING PROGRAM

## 2022-12-11 PROCEDURE — 74018 RADEX ABDOMEN 1 VIEW: CPT | Mod: 26 | Performed by: RADIOLOGY

## 2022-12-11 PROCEDURE — 250N000009 HC RX 250

## 2022-12-11 PROCEDURE — 44500 INTRO GASTROINTESTINAL TUBE: CPT

## 2022-12-11 PROCEDURE — 250N000011 HC RX IP 250 OP 636: Performed by: STUDENT IN AN ORGANIZED HEALTH CARE EDUCATION/TRAINING PROGRAM

## 2022-12-11 PROCEDURE — 82803 BLOOD GASES ANY COMBINATION: CPT | Performed by: STUDENT IN AN ORGANIZED HEALTH CARE EDUCATION/TRAINING PROGRAM

## 2022-12-11 PROCEDURE — 85027 COMPLETE CBC AUTOMATED: CPT

## 2022-12-11 PROCEDURE — 76705 ECHO EXAM OF ABDOMEN: CPT

## 2022-12-11 PROCEDURE — 95720 EEG PHY/QHP EA INCR W/VEEG: CPT | Performed by: PSYCHIATRY & NEUROLOGY

## 2022-12-11 PROCEDURE — 86850 RBC ANTIBODY SCREEN: CPT | Performed by: STUDENT IN AN ORGANIZED HEALTH CARE EDUCATION/TRAINING PROGRAM

## 2022-12-11 PROCEDURE — 250N000009 HC RX 250: Performed by: STUDENT IN AN ORGANIZED HEALTH CARE EDUCATION/TRAINING PROGRAM

## 2022-12-11 PROCEDURE — 36592 COLLECT BLOOD FROM PICC: CPT | Performed by: STUDENT IN AN ORGANIZED HEALTH CARE EDUCATION/TRAINING PROGRAM

## 2022-12-11 PROCEDURE — 258N000003 HC RX IP 258 OP 636: Performed by: STUDENT IN AN ORGANIZED HEALTH CARE EDUCATION/TRAINING PROGRAM

## 2022-12-11 PROCEDURE — 82140 ASSAY OF AMMONIA: CPT | Performed by: STUDENT IN AN ORGANIZED HEALTH CARE EDUCATION/TRAINING PROGRAM

## 2022-12-11 PROCEDURE — 120N000002 HC R&B MED SURG/OB UMMC

## 2022-12-11 PROCEDURE — 250N000011 HC RX IP 250 OP 636

## 2022-12-11 PROCEDURE — 36569 INSJ PICC 5 YR+ W/O IMAGING: CPT

## 2022-12-11 PROCEDURE — 95714 VEEG EA 12-26 HR UNMNTR: CPT

## 2022-12-11 PROCEDURE — 83605 ASSAY OF LACTIC ACID: CPT | Performed by: STUDENT IN AN ORGANIZED HEALTH CARE EDUCATION/TRAINING PROGRAM

## 2022-12-11 PROCEDURE — 84443 ASSAY THYROID STIM HORMONE: CPT | Performed by: STUDENT IN AN ORGANIZED HEALTH CARE EDUCATION/TRAINING PROGRAM

## 2022-12-11 PROCEDURE — 82310 ASSAY OF CALCIUM: CPT

## 2022-12-11 PROCEDURE — 36415 COLL VENOUS BLD VENIPUNCTURE: CPT | Performed by: STUDENT IN AN ORGANIZED HEALTH CARE EDUCATION/TRAINING PROGRAM

## 2022-12-11 PROCEDURE — 83735 ASSAY OF MAGNESIUM: CPT | Performed by: STUDENT IN AN ORGANIZED HEALTH CARE EDUCATION/TRAINING PROGRAM

## 2022-12-11 PROCEDURE — 76705 ECHO EXAM OF ABDOMEN: CPT | Mod: 26 | Performed by: RADIOLOGY

## 2022-12-11 PROCEDURE — 999N000065 XR ABDOMEN PORT 1 VIEW

## 2022-12-11 PROCEDURE — 250N000012 HC RX MED GY IP 250 OP 636 PS 637: Performed by: STUDENT IN AN ORGANIZED HEALTH CARE EDUCATION/TRAINING PROGRAM

## 2022-12-11 PROCEDURE — 99207 XR FEEDING TUBE PLACEMENT: CPT | Performed by: RADIOLOGY

## 2022-12-11 PROCEDURE — 84481 FREE ASSAY (FT-3): CPT | Performed by: STUDENT IN AN ORGANIZED HEALTH CARE EDUCATION/TRAINING PROGRAM

## 2022-12-11 PROCEDURE — 84425 ASSAY OF VITAMIN B-1: CPT | Performed by: STUDENT IN AN ORGANIZED HEALTH CARE EDUCATION/TRAINING PROGRAM

## 2022-12-11 PROCEDURE — 71045 X-RAY EXAM CHEST 1 VIEW: CPT

## 2022-12-11 PROCEDURE — 71045 X-RAY EXAM CHEST 1 VIEW: CPT | Mod: 26 | Performed by: RADIOLOGY

## 2022-12-11 PROCEDURE — 82607 VITAMIN B-12: CPT | Performed by: STUDENT IN AN ORGANIZED HEALTH CARE EDUCATION/TRAINING PROGRAM

## 2022-12-11 RX ORDER — ACETAMINOPHEN 650 MG/1
650 SUPPOSITORY RECTAL EVERY 4 HOURS PRN
Status: DISCONTINUED | OUTPATIENT
Start: 2022-12-11 | End: 2022-12-14 | Stop reason: HOSPADM

## 2022-12-11 RX ORDER — CEFTRIAXONE 1 G/1
1 INJECTION, POWDER, FOR SOLUTION INTRAMUSCULAR; INTRAVENOUS EVERY 24 HOURS
Status: DISCONTINUED | OUTPATIENT
Start: 2022-12-11 | End: 2022-12-11

## 2022-12-11 RX ORDER — HYDRALAZINE HYDROCHLORIDE 20 MG/ML
5 INJECTION INTRAMUSCULAR; INTRAVENOUS
Status: DISCONTINUED | OUTPATIENT
Start: 2022-12-11 | End: 2022-12-11

## 2022-12-11 RX ORDER — CARVEDILOL 6.25 MG/1
12.5 TABLET ORAL 2 TIMES DAILY WITH MEALS
Status: DISCONTINUED | OUTPATIENT
Start: 2022-12-11 | End: 2022-12-14 | Stop reason: HOSPADM

## 2022-12-11 RX ORDER — NICOTINE POLACRILEX 4 MG
15-30 LOZENGE BUCCAL
Status: DISCONTINUED | OUTPATIENT
Start: 2022-12-11 | End: 2022-12-11

## 2022-12-11 RX ORDER — DEXTROSE MONOHYDRATE 25 G/50ML
25-50 INJECTION, SOLUTION INTRAVENOUS
Status: DISCONTINUED | OUTPATIENT
Start: 2022-12-11 | End: 2022-12-11

## 2022-12-11 RX ORDER — HYDRALAZINE HYDROCHLORIDE 20 MG/ML
10 INJECTION INTRAMUSCULAR; INTRAVENOUS
Status: DISCONTINUED | OUTPATIENT
Start: 2022-12-11 | End: 2022-12-11

## 2022-12-11 RX ORDER — HYDRALAZINE HYDROCHLORIDE 20 MG/ML
5 INJECTION INTRAMUSCULAR; INTRAVENOUS EVERY 6 HOURS PRN
Status: DISCONTINUED | OUTPATIENT
Start: 2022-12-11 | End: 2022-12-14 | Stop reason: HOSPADM

## 2022-12-11 RX ORDER — ASPIRIN 81 MG/1
81 TABLET, CHEWABLE ORAL DAILY
Status: DISCONTINUED | OUTPATIENT
Start: 2022-12-11 | End: 2022-12-14 | Stop reason: HOSPADM

## 2022-12-11 RX ORDER — CEFTRIAXONE 1 G/1
1 INJECTION, POWDER, FOR SOLUTION INTRAMUSCULAR; INTRAVENOUS EVERY 24 HOURS
Status: DISCONTINUED | OUTPATIENT
Start: 2022-12-11 | End: 2022-12-13

## 2022-12-11 RX ORDER — ATORVASTATIN CALCIUM 40 MG/1
40 TABLET, FILM COATED ORAL DAILY
Status: DISCONTINUED | OUTPATIENT
Start: 2022-12-11 | End: 2022-12-14 | Stop reason: HOSPADM

## 2022-12-11 RX ORDER — ACETAMINOPHEN 325 MG/10.15ML
650 LIQUID ORAL EVERY 4 HOURS PRN
Status: DISCONTINUED | OUTPATIENT
Start: 2022-12-11 | End: 2022-12-14 | Stop reason: HOSPADM

## 2022-12-11 RX ORDER — LIDOCAINE HYDROCHLORIDE 20 MG/ML
10 SOLUTION OROPHARYNGEAL ONCE
Status: COMPLETED | OUTPATIENT
Start: 2022-12-11 | End: 2022-12-11

## 2022-12-11 RX ORDER — LIDOCAINE 40 MG/G
CREAM TOPICAL
Status: DISCONTINUED | OUTPATIENT
Start: 2022-12-11 | End: 2022-12-14 | Stop reason: HOSPADM

## 2022-12-11 RX ORDER — DOXAZOSIN 1 MG/1
1 TABLET ORAL AT BEDTIME
Status: DISCONTINUED | OUTPATIENT
Start: 2022-12-11 | End: 2022-12-14 | Stop reason: HOSPADM

## 2022-12-11 RX ORDER — LABETALOL HYDROCHLORIDE 5 MG/ML
10 INJECTION, SOLUTION INTRAVENOUS EVERY 10 MIN PRN
Status: DISCONTINUED | OUTPATIENT
Start: 2022-12-11 | End: 2022-12-14 | Stop reason: HOSPADM

## 2022-12-11 RX ADMIN — LABETALOL HYDROCHLORIDE 10 MG: 5 INJECTION, SOLUTION INTRAVENOUS at 03:01

## 2022-12-11 RX ADMIN — INSULIN ASPART 4 UNITS: 100 INJECTION, SOLUTION INTRAVENOUS; SUBCUTANEOUS at 05:09

## 2022-12-11 RX ADMIN — THIAMINE HYDROCHLORIDE 500 MG: 100 INJECTION, SOLUTION INTRAMUSCULAR; INTRAVENOUS at 10:32

## 2022-12-11 RX ADMIN — LEVETIRACETAM 500 MG: 5 INJECTION INTRAVENOUS at 07:53

## 2022-12-11 RX ADMIN — LABETALOL HYDROCHLORIDE 10 MG: 5 INJECTION, SOLUTION INTRAVENOUS at 03:13

## 2022-12-11 RX ADMIN — LEVETIRACETAM 500 MG: 5 INJECTION INTRAVENOUS at 19:33

## 2022-12-11 RX ADMIN — CEFTRIAXONE SODIUM 1 G: 1 INJECTION, POWDER, FOR SOLUTION INTRAMUSCULAR; INTRAVENOUS at 08:49

## 2022-12-11 RX ADMIN — INSULIN ASPART 3 UNITS: 100 INJECTION, SOLUTION INTRAVENOUS; SUBCUTANEOUS at 08:52

## 2022-12-11 RX ADMIN — LABETALOL HYDROCHLORIDE 10 MG: 5 INJECTION, SOLUTION INTRAVENOUS at 03:26

## 2022-12-11 RX ADMIN — LABETALOL HYDROCHLORIDE 10 MG: 5 INJECTION, SOLUTION INTRAVENOUS at 03:38

## 2022-12-11 RX ADMIN — HYDRALAZINE HYDROCHLORIDE 5 MG: 20 INJECTION INTRAMUSCULAR; INTRAVENOUS at 05:24

## 2022-12-11 RX ADMIN — LIDOCAINE HYDROCHLORIDE 1 ML: 10 INJECTION, SOLUTION EPIDURAL; INFILTRATION; INTRACAUDAL; PERINEURAL at 14:01

## 2022-12-11 RX ADMIN — LABETALOL HYDROCHLORIDE 10 MG: 5 INJECTION, SOLUTION INTRAVENOUS at 04:49

## 2022-12-11 RX ADMIN — LIDOCAINE HYDROCHLORIDE 15 ML: 20 SOLUTION ORAL; TOPICAL at 11:58

## 2022-12-11 RX ADMIN — SODIUM CHLORIDE: 9 INJECTION, SOLUTION INTRAVENOUS at 13:54

## 2022-12-11 RX ADMIN — THIAMINE HYDROCHLORIDE 500 MG: 100 INJECTION, SOLUTION INTRAMUSCULAR; INTRAVENOUS at 20:49

## 2022-12-11 RX ADMIN — ENOXAPARIN SODIUM 40 MG: 40 INJECTION SUBCUTANEOUS at 19:37

## 2022-12-11 ASSESSMENT — ACTIVITIES OF DAILY LIVING (ADL)
ADLS_ACUITY_SCORE: 35
ADLS_ACUITY_SCORE: 55
ADLS_ACUITY_SCORE: 55
ADLS_ACUITY_SCORE: 35
ADLS_ACUITY_SCORE: 35
ADLS_ACUITY_SCORE: 55
ADLS_ACUITY_SCORE: 41
ADLS_ACUITY_SCORE: 41
ADLS_ACUITY_SCORE: 35
ADLS_ACUITY_SCORE: 55

## 2022-12-11 NOTE — PROVIDER NOTIFICATION
Spoke to Reddy at 03:22 about patients fluctuating neuro exam, HTN despite labatolol treatment and blood glucose of 300. MD nformed writer she will order hydralazine, sliding scale insulin, and that neuro exam is consistent with her exam. Continue to monitor.

## 2022-12-11 NOTE — PROVIDER NOTIFICATION
"\"JOCELYNN Matthews 6A rm [id number] Neuro    Pt BP elevated to 190/99, no PRNs available. Also pressed event button for more pronounced decorticate posturing (R>L). Pupils sluggish and seems to have mild nystagmus at rest. dyana Rivers [id number]\"        Paged neurology resident at [phone]    "

## 2022-12-11 NOTE — CODE/RAPID RESPONSE
Rapid Response Team Note    Assessment   In assessment a rapid response was called on Isabell Matthews due to lactic acidosis.   - LA of 2.3  - Newly admitted patient by neurology for possible stroke, had comprehensive imaging and EEG done  - recent Hx of tooth extraction  - PMH of  ischemic stroke, vascular dementia, seizure disorder, CKD stage 3, type 1 diabetes mellitus, CAD s/p PCI, HTN, and HLD who presents to the Emergency Department via EMS after being found unresponsive by family    Plan   -  Along sepsis line    Risk factor: recent tooth extraction, llikely foster of local infection/abscess or overt sepsis with CNS manifestation possible but imaging are non revealing, WBC not elevated, LA only minimally elevated; Vitals relatively stable. Constellation of findings less suspecious for sepsis. Can involve ENT if new suspicion of local infecsious process at site of tooth extraction     Agree with repeat LA, Blood culture and UA    Briefly discussed primary who indicated  active follow up and monitoring  - Other comprehensive investigations and neurologic managements per primary  -  Disposition: The patient will remain on the current unit. We will continue to monitor this patient closely.  -  Reassessment and plan follow-up will be performed by the primary team      Jeffrey Mota PA-C  [facility] RRT [facility] Job Code Contact #[phone]  [facility] Paging/Directory    Hospital Course   Brief Summary of events leading to rapid response:   Isabell Matthews is a 62 year old female with history of ischemic stroke, vascular dementia, seizure disorder, CKD stage 3, type 1 diabetes mellitus, CAD s/p PCI, HTN, and HLD who presents to the Emergency Department via EMS after being found unresponsive by family. Imaging so far via CT, CTA and MRI has ruled out stroke or other acute intracranial process.  Exam notable for a left gaze preference, suspicious for postictal state arising from the left hemisphere.  EEG shows far shows severe  slowing bilaterally and appears to be relatively symmetric without any epileptiform discharges  RRT called due to sepsis triggered lactic acidosis     Admission Diagnosis:   Altered mental status, unspecified altered mental status type [R41.82]  Intractable epilepsy with status epilepticus, unspecified epilepsy type (H) [G40.911]    Physical Exam   Temp: 98.9  F (37.2  C) Temp  Min: 96.3  F (35.7  C)  Max: 99.6  F (37.6  C)  Resp: 11 Resp  Min: 9  Max: 20  SpO2: 97 % SpO2  Min: 95 %  Max: 100 %  Pulse: 96 Pulse  Min: 96  Max: 134    No data recorded  BP: (!) 161/77 Systolic (24hrs), Av , Min:81 , Max:212   Diastolic (24hrs), Av, Min:49, Max:119     I/Os: No intake/output data recorded.         Significant Results and Procedures   Lactic Acid:   Recent Labs   Lab Test 22  0720 12/10/22  1501 12/10/22  1133   LACT 2.3* 1.3 1.9     CBC:   Recent Labs   Lab Test 22  0720 12/10/22  1138 12/10/22  1133 10/31/22  1721   WBC 6.9  --  8.0 4.4   HGB 10.7* 13.3 13.0 11.4*   HCT 33.8* 39 39.8 35.6     --  185 190        Sepsis Evaluation   The patient is not known to have an infection.  NO EVIDENCE OF SEPSIS at this time.  Vital sign, physical exam, and lab findings are due to unclear neurologic process, possible infectious process being investigated .

## 2022-12-11 NOTE — PLAN OF CARE
Status: Admitted initially for stroke like symptoms, now on EEG for evaluation of seizures. History of vascular dementia, multiple strokes, CAD, CKD stg III, and DM I  Vitals: Persistently hypertensive despite IV labetalol given x5 (total of 50mg, MD aware) and IV hydralazine given x1 (5mg total). Tachycardic with rates  (MDs aware).   Neuros: Unresponsive, decorticate posturing at times. Withdraws to painful stimuli in all extremities. Does not follow commands, unable to open eyes independently. Bilateral nystagmus and intermittent gaze deviation  IV: PIV w/ NS @ 50 ml/hr  Labs/Electrolytes: pending AM labs  Resp/trach: RA w/ SpO2 98-99%, on capnography for worsening respiration quality (more irregular and increased snoring/apnea as shift progressed)  Diet: Strict NPO, potential placement of NG tube today  Bowel status: No BM since transfer from ED  : Incontinent, voiding via purewick  Skin: Blanchable redness throughout, 4x4 mepilex bandages placed on bony prominences on BLE for pressure off-loading   Pain: FOZIA - does not have any non-verbal s/s   Activity: Bedrest - lift, repo q2hrs  Plan: Potential transfer to higher level of care, potential NG tube placement for PO intake and PO meds. Triggered SIRS protocol this AM, Lactic Acid ordered per protocol.

## 2022-12-11 NOTE — PROGRESS NOTES
[facility]  General Neurology - Progress Note    Patient Name:  Isabell Matthews  Date of Service:  December 11, 2022    Subjective:    Patient somnolent and not answering questions this morning. I saw her again this afternoon and she was able to shake her head no when asked if she was in pain, but otherwise unable to provide subjective history.     Attempted to call daughter around 8 AM to obtain collateral history, however she did not  the phone. Left PHI-compliant voicemail. I was able to meet with daughter/PCA Maria Guadalupe this afternoon. She provided information about patient's baseline in the few days leading up to hospital visit. She tends to pocket food. She is quiet/keeps to herself at baseline. Sometimes uses a walker. Feeds herself. Spends her days watching TV, taking walks, doing puzzles. Requires assistance for finances, cooking, dressing, bathing. Has had multiple UTI's since the spring. Daughter believes last seizure was about 3 years ago. She administers Isabell's medications. She notes that Isabell had several teeth extracted this past Thursday and was unable to get the special anti-bacterial mouthwash she was supposed to use afterwards. Pt has tendency to nap frequently during the day but never the entire day.     Objective (Exam is from 12/11 at 8 AM):    Vitals: BP (!) 161/77 (BP Location: Left arm)   Pulse 96   Temp 98.9  F (37.2  C) (Axillary)   Resp 11   SpO2 97%   General: Lying in bed, lethargic  Head: Atraumatic, normocephalic. EEG leads in place.   Cardiac: no lower extremity edema  Neurologic:  Increased tone in arms and legs, fairly symmetric. Grimaces/withdraws equally to noxious stimuli x 4 extremities. No spontaneous movement observed. No gaze preference observed (midline conjugate gaze on my exam). Mild right nasolabial fold flattening. Babinski sign positive on the right.     Pertinent Investigations:    I have personally reviewed most recent and  pertinent labs, tests, and radiological images.       Assessment:  Ms. Matthews is a 62 year old woman with history of type 2 diabetes, recurrent UTI, CKD, vascular dementia, and CVA who presented as a stroke code on 12/10 after daughter found her at home unresponsive with ?seizure-like activity.  CTH, CTA, and hyperacute MRI were negative for acute stroke or LVO. Patient was subsequently admitted to general neurology for suspected post-ictal state.     Patient is on baseline levetiracetam 500 mg twice daily, and a level was obtained in the ED that was normal.  There is no concern for missed doses because the patient's daughter administers all her medications.  The patient was given 2 g of IV levetiracetam as well as a total of 3 mg of IV Ativan in the emergency department.  This seemed to improve her left gaze preference, however the patient has remained somnolent and somewhat rigid with left gaze preference (can be overcome) even through 12/11 AM. This improved as the afternoon went on. Preliminary EEG read on 12/10 PM was negative for seizures or epileptiform discharges. There is ?asymmetry between the two hemispheres. Unclear what provoked these potential seizures. UA is mildly suspicious for UTI so will treat this. Radiology placed bridled NG on 12/11, however, this failed to flush despite being pulled back and re-imaged twice after bridle was broken. Unfortunately, patient pulled NG on 12/11 PM and this was not replaced.      Plan  #Encephalopathy, multifactorial (suspected UTI, benzodiazepine administration), improving  #Suspected prolonged post-ictal state   -Continuous vEEG monitoring  - Treating UTI with ceftriaxone   - Blood cultures pending  - Low suspicion to consult dental medicine if pt develops fevers/leukocytosis despite UTI treatment (query dental abscess with spread to bloodstream)   - COVID-19 negative  - RRT called this AM for lactic acidosis - resolved upon recheck   - TSH, T3, T4 pending  -  Thiamine level drawn  - IV thiamine x 3 days followed by oral    - PT/OT if mental status continues to improve     #CKD  - Decreased maintenance NS from 125 to 100 mL/hr     #Brittle Type 2 Diabetes  - Q4 hour glucose checks while NPO   - High dose insulin sliding scale  - Endocrinology consult     #Elevated alkaline phosphatase   - RUQ ultrasound 12/11 negative  - Continue to monitor     #HTN  Baseline on Coreg and doxazosin. Was intermittently quite hypertensive in the evening 12/10 going into 12/11. Required 50 mg IV labetalol and 5 mg IV hydralazine to control. BP much improved during day. Unclear why BP was so elevated. No signs/symptoms of pain.   - Goal SBP < 180  - Currently holding oral antihypertensives with no safe oral access    FEN: NPO for now. Since clearing, ordered SLP consult for 12/12.   Malnutrition: Patient does not meet two of the above criteria necessary for diagnosing malnutrition  PPx: Lovenox   Lines: PIV, midline (hard stick)   Code: Full code, per chart review.    Patient was seen and discussed with Dr. Root.     Santiago Persaud MD  PGY-4 Neurology Resident   P: [id number]

## 2022-12-11 NOTE — PROGRESS NOTES
Time/length of seizure event: unknown, pressed event button at approx 0228  Movements (head, eyes, extremities): RUE, RLE flexed up and in; Head and trunk flexing/moving to R side. Bilateral nystagmus at rest unable to follow commands  Orientation during seizure: FOZIA - nonverbal   After seizure, remembers the unique phrase given during seizure: No  After seizure, remembers an unnamed visual object shown during seizure: No  Able to identify/name aloud item during seizure: No  Able to follow command during seizure: No  Able to read test sentence aloud during seizure: No  Able to read test sentence aloud again after the seizure: No  After seizure, remembers name of object shown during seizure: No  Orientation and level of consciousness after seizure: obtunded, unresponsive   VS and oxygen saturation during/after seizure: SpO2 98-99%, hypertensive, tachycardic  Recall of the event?:  FOZIA  Was this a typical seizure/event?: Unknown   Presence of aura or pre-seizure activity: FOZIA - nonverbal   Incontinence: Unknown - persistently incontinent        Time/length of seizure event: approx @ 0336 (button pressed)  Movements (head, eyes, extremities): LUE flexion, neck movement to left  Orientation during seizure: FOZIA - nonverbal   After seizure, remembers the unique phrase given during seizure: No  After seizure, remembers an unnamed visual object shown during seizure: No  Able to identify/name aloud item during seizure: No  Able to follow command during seizure: No  Able to read test sentence aloud during seizure: No  Able to read test sentence aloud again after the seizure: No  After seizure, remembers name of object shown during seizure: No  Orientation and level of consciousness after seizure: obtunded, unresponsive   VS and oxygen saturation during/after seizure: SpO2 98-99%, hypertensive, tachycardic  Recall of the event?:  FOZIA  Was this a typical seizure/event?: Unknown   Presence of aura or pre-seizure activity: FOZIA -  nonverbal   Incontinence: Unknown - persistently incontinent        Time/length of seizure event: at approx 0445  Movements (head, eyes, extremities): BUE flexion (appeared to be decorticate posturing)  Orientation during seizure: FOZIA - nonverbal   After seizure, remembers the unique phrase given during seizure: No  After seizure, remembers an unnamed visual object shown during seizure: No  Able to identify/name aloud item during seizure: No  Able to follow command during seizure: No  Able to read test sentence aloud during seizure: No  Able to read test sentence aloud again after the seizure: No  After seizure, remembers name of object shown during seizure: No  Orientation and level of consciousness after seizure: obtunded, unresponsive   VS and oxygen saturation during/after seizure: SpO2 98-99%, hypertensive, tachycardic  Recall of the event?:  FOZIA  Was this a typical seizure/event?: Unknown   Presence of aura or pre-seizure activity: FOZIA - nonverbal   Incontinence: Unknown - persistently incontinent

## 2022-12-11 NOTE — PROVIDER NOTIFICATION
Spoke to MD Blakely @ 05:20. Patients blood pressure still over 160 systolic. Total 50mg labetalol given and patient is still over parameters. MD informed to give 5mg hydralazine and to recheck blood pressure and notify her with results. MD informed writer of a possible plan for a nasogastric tube to restart her PO blood pressure medication.

## 2022-12-11 NOTE — PROGRESS NOTES
Arrived from: [facility] at 0146  Belongings/meds: clothing placed in closet   2 RN Skin Assessment Completed by: Silvestre CASTANEDA RN and Geronimo SCALES RN   Non-intact findings documented (yes/no/NA): Small friction wound on sacrum added to flow sheets

## 2022-12-11 NOTE — PROVIDER NOTIFICATION
12/11/22 0700   Call Information   Date of Call 12/11/22   Time of Call 0733   Name of person requesting the team Adriana GUILLEN   Title of person requesting team RN   RRT Arrival time 0740   Time RRT ended 0750   Reason for call   Type of RRT Adult   Primary reason for call Sepsis suspected   Sepsis Suspected Elevated Lactate level   Was patient transferred from the ED, ICU, or PACU within last 24 hours prior to RRT call? Yes   SBAR   Situation LA 2.3   Background PMH: seizures, T1DM, HTN, acquired hypothyroidism, CKD IIIb, HLD, fibromyalgia, and possible vascular dementia   Notable History/Conditions Neurological;Diabetes;Hypertension   Assessment Opens eyes to vigorous stimulation, withdraws to pain in all extremities; not following commands. VSS, on capno monitoring.   Interventions Labs   Patient Outcome   Patient Outcome Stabilized on unit   RRT Team   Attending/Primary/Covering Physician Neurology   Date Attending Physician notified 12/11/22   Time Attending Physician notified 0730   Physician(s) DONOVAN Sharp   Lead RN Desirae GUILLEN   RT n/a   Post RRT Intervention Assessment   Post RRT Assessment Stable/Improved   Date Follow Up Done 12/11/22   Time Follow Up Done 0955

## 2022-12-11 NOTE — PROCEDURES
[facility]    Double Lumen Midline Placement    Date/Time: 12/11/2022 2:04 PM  Performed by: DELONTE Finnegan  Authorized by: Santiago Persaud MD   Indications: vascular access      UNIVERSAL PROTOCOL   Site Marked: Yes  Prior Images Obtained and Reviewed:  Yes  Required items: Required blood products, implants, devices and special equipment available    Patient identity confirmed:  Verbally with patient, arm band, provided demographic data, hospital-assigned identification number and anonymous protocol, patient vented/unresponsive  Patient was reevaluated immediately before administering moderate or deep sedation or anesthesia  Confirmation Checklist:  Patient's identity using two indicators, relevant allergies, procedure was appropriate and matched the consent or emergent situation and correct equipment/implants were available  Time out: Immediately prior to the procedure a time out was called    Universal Protocol: the Joint Commission Universal Protocol was followed    Preparation: Patient was prepped and draped in usual sterile fashion       ANESTHESIA    Anesthesia: See MAR for details  Local Anesthetic:  Lidocaine 1% without epinephrine  Anesthetic Total (mL):  1      SEDATION    Patient Sedated: No        Preparation: skin prepped with ChloraPrep  Skin prep agent: skin prep agent completely dried prior to procedure  Sterile barriers: maximum sterile barriers were used: cap, mask, sterile gown, sterile gloves, and large sterile sheet  Hand hygiene: hand hygiene performed prior to central venous catheter insertion  Type of line used: Midline  Catheter type: double lumen  Lumen type: non-valved  Lumen Identification: Purple and Red  Catheter size: 5 Fr  Brand: Bard  Lot number: [id number]  Placement method: venipuncture, MST and ultrasound  Number of attempts: 1  Difficulty threading catheter: no  Successful placement: yes  Orientation: right    Location: basilic  vein  Tip Location: distal to axillary vein  Arm circumference: adults 10 cm  Extremity circumference: 30  Visible catheter length: 0  Total catheter length: 20  Dressing and securement: chlorhexidine patch applied, dressing applied, line secured, statlock, sterile dressing applied and transparent dressing  Post procedure assessment: blood return through all ports and free fluid flow  PROCEDURE   Patient Tolerance:  Patient tolerated the procedure well with no immediate complicationsDescribe Procedure: Mid line ok to use

## 2022-12-11 NOTE — PROVIDER NOTIFICATION
Neuro Resident paged (ANA BELLO)    ED-03, Cassie CABEZAS  2 sets of blood cultures sent at 1730, order for more BC put in at 1950, duplicate? Pt has DM1, please put in blood sugar checks and emergency meds for hypoglycemia, thank you!  Maryjo Mace [phone]

## 2022-12-12 ENCOUNTER — APPOINTMENT (OUTPATIENT)
Dept: SPEECH THERAPY | Facility: CLINIC | Age: 62
DRG: 101 | End: 2022-12-12
Attending: STUDENT IN AN ORGANIZED HEALTH CARE EDUCATION/TRAINING PROGRAM
Payer: MEDICARE

## 2022-12-12 ENCOUNTER — APPOINTMENT (OUTPATIENT)
Dept: NEUROLOGY | Facility: CLINIC | Age: 62
DRG: 101 | End: 2022-12-12
Payer: MEDICARE

## 2022-12-12 LAB
ALBUMIN UR-MCNC: 30 MG/DL
ANION GAP SERPL CALCULATED.3IONS-SCNC: 13 MMOL/L (ref 7–15)
APPEARANCE UR: CLEAR
BACTERIA #/AREA URNS HPF: ABNORMAL /HPF
BILIRUB UR QL STRIP: NEGATIVE
BUN SERPL-MCNC: 21.7 MG/DL (ref 8–23)
CALCIUM SERPL-MCNC: 8.2 MG/DL (ref 8.8–10.2)
CHLORIDE SERPL-SCNC: 112 MMOL/L (ref 98–107)
COLOR UR AUTO: ABNORMAL
CREAT SERPL-MCNC: 1.39 MG/DL (ref 0.51–0.95)
DEPRECATED HCO3 PLAS-SCNC: 18 MMOL/L (ref 22–29)
GFR SERPL CREATININE-BSD FRML MDRD: 42 ML/MIN/1.73M2
GLUCOSE BLDC GLUCOMTR-MCNC: 175 MG/DL (ref 70–99)
GLUCOSE BLDC GLUCOMTR-MCNC: 191 MG/DL (ref 70–99)
GLUCOSE BLDC GLUCOMTR-MCNC: 238 MG/DL (ref 70–99)
GLUCOSE BLDC GLUCOMTR-MCNC: 271 MG/DL (ref 70–99)
GLUCOSE BLDC GLUCOMTR-MCNC: 295 MG/DL (ref 70–99)
GLUCOSE BLDC GLUCOMTR-MCNC: 301 MG/DL (ref 70–99)
GLUCOSE SERPL-MCNC: 147 MG/DL (ref 70–99)
GLUCOSE UR STRIP-MCNC: 500 MG/DL
HCO3 BLDV-SCNC: 28 MMOL/L (ref 21–28)
HGB UR QL STRIP: NEGATIVE
HOLD SPECIMEN: NORMAL
KETONES UR STRIP-MCNC: 40 MG/DL
LACTATE BLD-SCNC: 1.8 MMOL/L
LEUKOCYTE ESTERASE UR QL STRIP: NEGATIVE
MAGNESIUM SERPL-MCNC: 1.6 MG/DL (ref 1.7–2.3)
NITRATE UR QL: NEGATIVE
PCO2 BLDV: 54 MM HG (ref 40–50)
PH BLDV: 7.32 [PH] (ref 7.32–7.43)
PH UR STRIP: 5.5 [PH] (ref 5–7)
PO2 BLDV: <15 MM HG (ref 25–47)
POTASSIUM SERPL-SCNC: 3.3 MMOL/L (ref 3.4–5.3)
POTASSIUM SERPL-SCNC: 3.4 MMOL/L (ref 3.4–5.3)
RBC URINE: 1 /HPF
SAO2 % BLDV: ABNORMAL %
SODIUM SERPL-SCNC: 143 MMOL/L (ref 136–145)
SP GR UR STRIP: 1.02 (ref 1–1.03)
SQUAMOUS EPITHELIAL: 7 /HPF
T4 FREE SERPL-MCNC: 0.86 NG/DL (ref 0.9–1.7)
UROBILINOGEN UR STRIP-MCNC: NORMAL MG/DL
WBC URINE: 4 /HPF

## 2022-12-12 PROCEDURE — 83735 ASSAY OF MAGNESIUM: CPT | Performed by: STUDENT IN AN ORGANIZED HEALTH CARE EDUCATION/TRAINING PROGRAM

## 2022-12-12 PROCEDURE — 250N000012 HC RX MED GY IP 250 OP 636 PS 637: Performed by: PHYSICIAN ASSISTANT

## 2022-12-12 PROCEDURE — 36415 COLL VENOUS BLD VENIPUNCTURE: CPT | Performed by: STUDENT IN AN ORGANIZED HEALTH CARE EDUCATION/TRAINING PROGRAM

## 2022-12-12 PROCEDURE — 84439 ASSAY OF FREE THYROXINE: CPT | Performed by: STUDENT IN AN ORGANIZED HEALTH CARE EDUCATION/TRAINING PROGRAM

## 2022-12-12 PROCEDURE — 250N000013 HC RX MED GY IP 250 OP 250 PS 637: Performed by: STUDENT IN AN ORGANIZED HEALTH CARE EDUCATION/TRAINING PROGRAM

## 2022-12-12 PROCEDURE — 99222 1ST HOSP IP/OBS MODERATE 55: CPT | Performed by: PHYSICIAN ASSISTANT

## 2022-12-12 PROCEDURE — 99232 SBSQ HOSP IP/OBS MODERATE 35: CPT | Mod: GC | Performed by: STUDENT IN AN ORGANIZED HEALTH CARE EDUCATION/TRAINING PROGRAM

## 2022-12-12 PROCEDURE — 84132 ASSAY OF SERUM POTASSIUM: CPT | Performed by: STUDENT IN AN ORGANIZED HEALTH CARE EDUCATION/TRAINING PROGRAM

## 2022-12-12 PROCEDURE — 36592 COLLECT BLOOD FROM PICC: CPT | Performed by: STUDENT IN AN ORGANIZED HEALTH CARE EDUCATION/TRAINING PROGRAM

## 2022-12-12 PROCEDURE — 80048 BASIC METABOLIC PNL TOTAL CA: CPT | Performed by: STUDENT IN AN ORGANIZED HEALTH CARE EDUCATION/TRAINING PROGRAM

## 2022-12-12 PROCEDURE — 250N000011 HC RX IP 250 OP 636

## 2022-12-12 PROCEDURE — 250N000011 HC RX IP 250 OP 636: Performed by: STUDENT IN AN ORGANIZED HEALTH CARE EDUCATION/TRAINING PROGRAM

## 2022-12-12 PROCEDURE — 258N000003 HC RX IP 258 OP 636

## 2022-12-12 PROCEDURE — 258N000003 HC RX IP 258 OP 636: Performed by: STUDENT IN AN ORGANIZED HEALTH CARE EDUCATION/TRAINING PROGRAM

## 2022-12-12 PROCEDURE — 81001 URINALYSIS AUTO W/SCOPE: CPT | Performed by: STUDENT IN AN ORGANIZED HEALTH CARE EDUCATION/TRAINING PROGRAM

## 2022-12-12 PROCEDURE — 92526 ORAL FUNCTION THERAPY: CPT | Mod: GN

## 2022-12-12 PROCEDURE — 92610 EVALUATE SWALLOWING FUNCTION: CPT | Mod: GN

## 2022-12-12 PROCEDURE — 120N000002 HC R&B MED SURG/OB UMMC

## 2022-12-12 PROCEDURE — 95718 EEG PHYS/QHP 2-12 HR W/VEEG: CPT | Performed by: PSYCHIATRY & NEUROLOGY

## 2022-12-12 PROCEDURE — 95711 VEEG 2-12 HR UNMONITORED: CPT

## 2022-12-12 RX ORDER — NICOTINE POLACRILEX 4 MG
15-30 LOZENGE BUCCAL
Status: DISCONTINUED | OUTPATIENT
Start: 2022-12-12 | End: 2022-12-14 | Stop reason: HOSPADM

## 2022-12-12 RX ORDER — DULOXETIN HYDROCHLORIDE 20 MG/1
20 CAPSULE, DELAYED RELEASE ORAL DAILY
Status: DISCONTINUED | OUTPATIENT
Start: 2022-12-12 | End: 2022-12-14 | Stop reason: HOSPADM

## 2022-12-12 RX ORDER — CHLORHEXIDINE GLUCONATE ORAL RINSE 1.2 MG/ML
SOLUTION DENTAL
COMMUNITY
Start: 2022-12-09 | End: 2023-05-21

## 2022-12-12 RX ORDER — LEVOTHYROXINE SODIUM 75 UG/1
75 TABLET ORAL DAILY
Status: DISCONTINUED | OUTPATIENT
Start: 2022-12-12 | End: 2022-12-14 | Stop reason: HOSPADM

## 2022-12-12 RX ORDER — CALCIUM CARBONATE 500 MG/1
500 TABLET, CHEWABLE ORAL DAILY PRN
Status: DISCONTINUED | OUTPATIENT
Start: 2022-12-12 | End: 2022-12-14 | Stop reason: HOSPADM

## 2022-12-12 RX ORDER — DEXTROSE MONOHYDRATE 25 G/50ML
25-50 INJECTION, SOLUTION INTRAVENOUS
Status: DISCONTINUED | OUTPATIENT
Start: 2022-12-12 | End: 2022-12-14 | Stop reason: HOSPADM

## 2022-12-12 RX ORDER — AMOXICILLIN 500 MG/1
500 CAPSULE ORAL 3 TIMES DAILY
Status: ON HOLD | COMMUNITY
Start: 2022-12-08 | End: 2022-12-14

## 2022-12-12 RX ORDER — POTASSIUM CHLORIDE 20MEQ/15ML
40 LIQUID (ML) ORAL ONCE
Status: COMPLETED | OUTPATIENT
Start: 2022-12-12 | End: 2022-12-12

## 2022-12-12 RX ORDER — LEVETIRACETAM 500 MG/1
500 TABLET ORAL 2 TIMES DAILY
Status: DISCONTINUED | OUTPATIENT
Start: 2022-12-12 | End: 2022-12-14

## 2022-12-12 RX ORDER — FAMOTIDINE 10 MG
10 TABLET ORAL 2 TIMES DAILY
Status: DISCONTINUED | OUTPATIENT
Start: 2022-12-12 | End: 2022-12-14 | Stop reason: HOSPADM

## 2022-12-12 RX ADMIN — THIAMINE HYDROCHLORIDE 500 MG: 100 INJECTION, SOLUTION INTRAMUSCULAR; INTRAVENOUS at 08:42

## 2022-12-12 RX ADMIN — DULOXETINE 20 MG: 20 CAPSULE, DELAYED RELEASE ORAL at 11:36

## 2022-12-12 RX ADMIN — THIAMINE HYDROCHLORIDE 500 MG: 100 INJECTION, SOLUTION INTRAMUSCULAR; INTRAVENOUS at 21:23

## 2022-12-12 RX ADMIN — CEFTRIAXONE SODIUM 1 G: 1 INJECTION, POWDER, FOR SOLUTION INTRAMUSCULAR; INTRAVENOUS at 09:29

## 2022-12-12 RX ADMIN — FAMOTIDINE 10 MG: 10 TABLET ORAL at 19:57

## 2022-12-12 RX ADMIN — SODIUM CHLORIDE: 9 INJECTION, SOLUTION INTRAVENOUS at 01:36

## 2022-12-12 RX ADMIN — SODIUM CHLORIDE: 9 INJECTION, SOLUTION INTRAVENOUS at 23:32

## 2022-12-12 RX ADMIN — ENOXAPARIN SODIUM 40 MG: 40 INJECTION SUBCUTANEOUS at 21:23

## 2022-12-12 RX ADMIN — INSULIN GLARGINE 20 UNITS: 100 INJECTION, SOLUTION SUBCUTANEOUS at 18:30

## 2022-12-12 RX ADMIN — LEVETIRACETAM 500 MG: 500 TABLET, FILM COATED ORAL at 18:19

## 2022-12-12 RX ADMIN — LEVETIRACETAM 500 MG: 5 INJECTION INTRAVENOUS at 08:01

## 2022-12-12 RX ADMIN — CARVEDILOL 12.5 MG: 6.25 TABLET, FILM COATED ORAL at 18:19

## 2022-12-12 RX ADMIN — SODIUM CHLORIDE: 9 INJECTION, SOLUTION INTRAVENOUS at 13:13

## 2022-12-12 RX ADMIN — POTASSIUM CHLORIDE 40 MEQ: 40 SOLUTION ORAL at 14:13

## 2022-12-12 RX ADMIN — INSULIN HUMAN 8 UNITS: 100 INJECTION, SUSPENSION SUBCUTANEOUS at 10:36

## 2022-12-12 RX ADMIN — DOXAZOSIN 1 MG: 1 TABLET ORAL at 21:24

## 2022-12-12 ASSESSMENT — ACTIVITIES OF DAILY LIVING (ADL)
ADLS_ACUITY_SCORE: 55
ADLS_ACUITY_SCORE: 44
ADLS_ACUITY_SCORE: 44
ADLS_ACUITY_SCORE: 55
ADLS_ACUITY_SCORE: 44
ADLS_ACUITY_SCORE: 44
ADLS_ACUITY_SCORE: 55
ADLS_ACUITY_SCORE: 44

## 2022-12-12 NOTE — PHARMACY-ADMISSION MEDICATION HISTORY
Admission Medication History Completed by Pharmacy    See [facility] Admission Navigator for allergy information, preferred outpatient pharmacy, prior to admission medications and immunization status.     Medication History Sources:     Sure Scripts, Care Everywhere    Patient AMS, unable to reach caregiver/daughter Maria Guadalupe x2    Changes made to PTA medication list (reason):    Added: amoxicillin and chlorhexidine (billed 12/8/22, RX from [facility], unclear if patient has started treatment).    Deleted: None    Changed: None    Additional Information:    Carvedilol: recent increase from 6.25 mg in November.    Insulin glargine: billed as 22 units (chart currently 20 units).    Synthroid: recent decrease in dose from 88 mcg in November. RAMÍREZ.    Prior to Admission medications    Medication Sig Last Dose Taking? Auth Provider Long Term End Date   amoxicillin (AMOXIL) 500 MG capsule Take 500 mg by mouth 3 times daily For 7 days.  Yes Unknown, Entered By History     atorvastatin (LIPITOR) 40 MG tablet Take 1 tablet (40 mg) by mouth daily  Yes Bony Castaneda MD Yes    blood glucose (NO BRAND SPECIFIED) test strip Use to test blood sugar 4-5 times daily or as directed.  Yes Bony Castaneda MD     carvedilol (COREG) 12.5 MG tablet Take 1 tablet (12.5 mg) by mouth 2 times daily (with meals)  Yes Bony Castaneda MD Yes    chlorhexidine (PERIDEX) 0.12 % solution Swish and spit 30 mL once daily for 14 days.  Yes Unknown, Entered By History     cyanocobalamin (VITAMIN B-12) 1000 MCG tablet Take 1 tablet (1,000 mcg) by mouth daily  Yes Bony Castaneda MD     diclofenac (VOLTAREN) 1 % topical gel Apply 2 g topically 4 times daily as needed for moderate pain  Yes Bony Castaneda MD     doxazosin (CARDURA) 1 MG tablet Take 1 tablet (1 mg) by mouth At Bedtime  Yes Bony Castaneda MD Yes    DULoxetine (CYMBALTA) 20 MG capsule Take 1 capsule (20 mg) by mouth daily  Yes Bony Castaneda MD Yes    insulin glargine  (LANTUS SOLOSTAR) 100 UNIT/ML pen INJECT 20 UNITS SUBCUTANEOUS AT BEDTIME  Yes Bony Castaneda MD Yes    insulin pen needle (31G X 8 MM) 31G X 8 MM miscellaneous Use 4 pen needles daily or as directed.  Yes Bony Castaneda MD     levETIRAcetam (KEPPRA) 500 MG tablet Take 1 tablet (500 mg) by mouth 2 times daily  Yes Bony Castaneda MD Yes    loratadine (CLARITIN) 10 mg tablet [LORATADINE (CLARITIN) 10 MG TABLET] Take 10 mg by mouth daily.  Yes Provider, Historical     melatonin 10 mg Tab Take 10 mg by mouth nightly as needed  Yes Provider, Historical     SYNTHROID 75 MCG tablet Take 1 tablet (75 mcg) by mouth daily  Yes Bony Castaneda MD Yes    Vitamin D3 (CHOLECALCIFEROL) 25 mcg (1000 units) tablet Take 1 tablet (25 mcg) by mouth daily  Yes Bony Castaneda MD     aspirin (ASA) 81 MG chewable tablet Take 1 tablet (81 mg) by mouth daily   Julio C Hay MD Yes 5/8/22   insulin lispro (HUMALOG) 100 UNIT/ML vial Inject 1-10 Units Subcutaneous 3 times daily (before meals) Correction Scale - HIGH INSULIN RESISTANCE DOSING: Do Not give Correction Insulin if Pre-Meal BG less than 140. For Pre-Meal  - 164 give 1 unit. For Pre-Meal  - 189 give 2 units. For Pre-Meal  - 214 give 3 units. For Pre-Meal  - 239 give 4 units. For Pre-Meal  - 264 give 5 units. For Pre-Meal  - 289 give 6 units. For Pre-Meal  - 314 give 7 units. For Pre-Meal  - 339 give 8 units. For Pre-Meal  - 364 give 9 units. For Pre-Meal BG greater than or equal to 365 give 10 units.To be given with prandial insulin, and based on pre-meal blood glucose. Notify provider if glucose greater than or equal to 350 mg/dL after administration of correction dose. If given at mealtime, administer within 30 minutes of start of meal.   Carmen Driscoll MD Yes 6/13/22   insulin lispro (HUMALOG) 100 UNIT/ML vial Inject 1-7 Units Subcutaneous At Bedtime HIGH INSULIN RESISTANCE DOSING:  Do Not give Bedtime Correction Insulin if BG less than 200. For  - 224 give 1 units. For  - 249 give 2 units. For  - 274 give 3 units. For  - 299 give 4 units. For  - 324 give 5 units. For  - 349 give 6 units. For BG greater than or equal to 350 give 7 units. Notify provider if glucose greater than or equal to 350 mg/dL after administration of correction dose. If given at mealtime, administer within 30 minutes of start of meal.   Carmen Driscoll MD Yes 6/13/22       Date completed: 12/12/22    Medication history completed by: Christy Marcelino, LucyD

## 2022-12-12 NOTE — PLAN OF CARE
Vitals: VSS  Neuros: Orientated to self.  Speech slow, minimal, garbled. Strengths 4/5 throughout. Follows commands, slow to respond. Nystagmus  IV: Midline infusing NS at 100 ml/hr. PIV SLd  Labs/Electrolytes: K replaced   Resp/trach: WNL  Diet: Worked with SLP and was advanced to a Full Liq mildly thick liquid. Good po-Feeder, pockets   Bowel status: BM 12/12 via commode    : Tried to void multiple times, straight cathed for 400 ml this afternoon.   Skin: Tongue bruised/swollen from biting.  Pain: Denies  Activity: Turns q2h. Up with assist of 1 to commode   Social: NA  Updates this shift: EEG leads removed this shift. Started on diet. Plan for MRI tonight, tried calling daughter to verify that there has been no changes to list since last MRI.

## 2022-12-12 NOTE — PLAN OF CARE
Status: Admitted after being found unresponsive at home to general neurology service for suspected post-ictal state. EEG monitoring, no events witnessed or reported this shift.   Vitals: VSS, RA. Tachycardia resolved. BPs WDL.   Neuros: Wax/wane. Intermittently alert. Tracks writer through room. Minimally verbal. Intermittently follows simple, one step commands. Smiles. R facial swelling from recent tooth extraction.   IV: PIV infusing NS at 100mL/hr. DL midline placed today in RUE.   Labs/Electrolytes: Triggered sepsis for LA of 2.3, redrawn for 2.0. Elevated BGs. Multiple labs drawn today, see flowsheets.   Resp/trach: LS clear with diminished bases. Snoring intermittently when sleeping. Continuous pulse oximetry.   Diet: NPO. Hx of pocketing food and medications. NGT placed in x-ray. Kinked and unable to be used despite pulling back total of 8cm per MD orders. X-ray showing tube is still kinked, team paged with this latest finding.   Bowel status: BS+, LBM unknown.   : Voiding incontinently with purewick.   Skin: Blanchable redness to pressure points and bony prominences. Mepilex between knees. Coccyx with friction abrasion, pink and skin intact. Poor dentition with caries and missing teeth. Recent hx of 3 teeth pulled from the right side. Tongue bruised and swollen from being bitten. Inside of R cheek with lesions from being bitten.   Pain: No s/s pf pain. Denies when asked.   Activity: Up with 2, lift. T&R q2h.   Social: Daughter at bedside today. Good historian for patient.   Plan: EEG through night, neurology to continue infectious work up. SW/CC consulted for home supplies.

## 2022-12-12 NOTE — PROGRESS NOTES
"  [facility]  General Neurology Progress Note    Patient Name:  Isabell Matthews  MRN:  [id number]    :  1960  Date of Service: 2022    Interval history/Subjective:   Overnight patient pulling at vEEG leads and nursing wrapped head with ace wraps. Patient pulled NG tube overnight and not replaced. Hemodynamically stable overnight. Patient seen at bedside in AM. Patient awake and alert. Exam limited due to encephalopathy but patient denied pain.    ROS: Limited to subjective.    Physical Examination   Vitals: BP (!) 153/68 (BP Location: Left arm)   Pulse 84   Temp 98.7  F (37.1  C) (Oral)   Resp 18   Ht 1.651 m (5' 5\")   Wt 56.7 kg (125 lb)   SpO2 99%   BMI 20.80 kg/m    Physical Exam:  Constitutional: Alert. Lying in bed comfortably. No acute distress.   Head: Atraumatic, normocephalic.  Respiratory: No increased work of breathing, no accessory muscle use.   Musculoskeletal: Moving all four limbs spontaneously. Grossly intact range of motion.   Neurologic:   Mental Status: alert, oriented to person but not place or tiime. Speech is fluent, difficult to comprehend, and follows commands. No dysarthria.  Cranial Nerves: pupils equal, round, and reactive to light and accommodation, EOMI without nystagmus, eyes move conjugately, no saccadic movement, no skew. Visual fields full. Smile and eyebrow raise equal, blinking symmetric, no weakness. Mild right nasolabial folds flatteniinig. Hearing intact to conversation.  Motor: no atrophy or fasciculations observed. Spontaneous movement with with antigravity x4 extremities   Strength is 5/5 at bilateral shoulder abductors, elbow flex/ext, wrist ext, hip flex  Reflexes: Diffusely hyper reflexive & symmetric at the patellar/achilles. Jaw jerk hyperreflexic. Positive babinski sign bilaterally. Gabellar & palmomental reflexes are observed.  Sensory: Responds to touch in all extremities- grasp in uppers and withdraws " lowers  Coordination: unable to assess  Gait: unable to assess    Investigations   Results for orders placed or performed during the hospital encounter of 12/10/22 (from the past 24 hour(s))   Glucose by meter   Result Value Ref Range    GLUCOSE BY METER POCT 176 (H) 70 - 99 mg/dL   XR Abdomen Port 1 View    Narrative    EXAMINATION:  XR ABDOMEN PORT 1 VIEW 12/11/2022 6:41 PM     COMPARISON: Abdominal radiograph 12/11/2022.    HISTORY: NG pulled back 5 cm, still not flushing. Query whether it is  kinked..    FINDINGS: Frontal view of the abdomen. Feeding tube coiled within  stomach with tip projecting superiorly over gastric fundus. Tube  appears to be kinked towards the distal stomach. No abnormally dilated  loops of bowel. Small amount contrast in the bowel. No acute osseous  abnormality. Partially visualized screws in the left acetabulum.  Atherosclerosis. Lung bases are unremarkable.      Impression    IMPRESSION: Feeding tube coiled within the stomach with tip projecting  over gastric fundus and likely kinking of the catheter in the distal  stomach. Can consider 9 cm retraction to reduce coiling and possibly  undo region of kinking. Follow-up radiograph to reevaluate.    I have personally reviewed the examination and initial interpretation  and I agree with the findings.    LEIDY ESPINOZA MD         SYSTEM ID:  [id number]   Glucose by meter   Result Value Ref Range    GLUCOSE BY METER POCT 244 (H) 70 - 99 mg/dL   Magnesium   Result Value Ref Range    Magnesium 1.6 (L) 1.7 - 2.3 mg/dL   Glucose by meter   Result Value Ref Range    GLUCOSE BY METER POCT 295 (H) 70 - 99 mg/dL   UA with Microscopic reflex to Culture    Specimen: Urine, Catheter   Result Value Ref Range    Color Urine Light Yellow Colorless, Straw, Light Yellow, Yellow    Appearance Urine Clear Clear    Glucose Urine 500 (A) Negative mg/dL    Bilirubin Urine Negative Negative    Ketones Urine 40 (A) Negative mg/dL    Specific Gravity Urine 1.025  1.003 - 1.035    Blood Urine Negative Negative    pH Urine 5.5 5.0 - 7.0    Protein Albumin Urine 30 (A) Negative mg/dL    Urobilinogen Urine Normal Normal, 2.0 mg/dL    Nitrite Urine Negative Negative    Leukocyte Esterase Urine Negative Negative    Bacteria Urine Few (A) None Seen /HPF    RBC Urine 1 <=2 /HPF    WBC Urine 4 <=5 /HPF    Squamous Epithelials Urine 7 (H) <=1 /HPF    Narrative    Urine Culture not indicated   Glucose by meter   Result Value Ref Range    GLUCOSE BY METER POCT 175 (H) 70 - 99 mg/dL   Magnesium   Result Value Ref Range    Magnesium 1.6 (L) 1.7 - 2.3 mg/dL   Extra Tube    Narrative    The following orders were created for panel order Extra Tube.  Procedure                               Abnormality         Status                     ---------                               -----------         ------                     Extra Purple Top Tube[[id number]]                            Final result                 Please view results for these tests on the individual orders.   Extra Purple Top Tube   Result Value Ref Range    Hold Specimen [facility]    Basic metabolic panel   Result Value Ref Range    Sodium 143 136 - 145 mmol/L    Potassium 3.3 (L) 3.4 - 5.3 mmol/L    Chloride 112 (H) 98 - 107 mmol/L    Carbon Dioxide (CO2) 18 (L) 22 - 29 mmol/L    Anion Gap 13 7 - 15 mmol/L    Urea Nitrogen 21.7 8.0 - 23.0 mg/dL    Creatinine 1.39 (H) 0.51 - 0.95 mg/dL    Calcium 8.2 (L) 8.8 - 10.2 mg/dL    Glucose 147 (H) 70 - 99 mg/dL    GFR Estimate 42 (L) >60 mL/min/1.73m2   Glucose by meter   Result Value Ref Range    GLUCOSE BY METER POCT 238 (H) 70 - 99 mg/dL   Glucose by meter   Result Value Ref Range    GLUCOSE BY METER POCT 301 (H) 70 - 99 mg/dL   Potassium   Result Value Ref Range    Potassium 3.4 3.4 - 5.3 mmol/L     Assessment:  Ms. Matthews is a 62 year old woman with history of type 2 diabetes, recurrent UTI, CKD, vascular dementia, and CVA who presented as a stroke code on 12/10 after daughter found her at  home unresponsive with ?seizure-like activity.  CTH, CTA, and hyperacute MRI were negative for acute stroke or LVO. Patient was subsequently admitted to general neurology for suspected post-ictal state.      Patient is on baseline levetiracetam 500 mg twice daily, and a level was obtained in the ED that was normal.  There is no concern for missed doses because the patient's daughter administers all her medications.  The patient was given 2 g of IV levetiracetam as well as a total of 3 mg of IV Ativan in the emergency department.  This seemed to improve her left gaze preference, however the patient has remained somnolent and somewhat rigid with left gaze preference (can be overcome) even through 12/11 AM. This improved as the afternoon went on. EEG read on 12/11 was negative for seizures or epileptiform discharges. There is generalized slowing between the two hemispheres. Unclear what provoked these potential seizures. vEEG discontinued 12/12 due to negative for seizures and epileptiform discharges. UA is mildly suspicious for UTI and urine culture positive for > 100,000 E. Coli colonies. Treating UTI wiith ceftriaxone. Radiology placed bridled NG on 12/11, however, this failed to flush despite being pulled back and re-imaged twice after bridle was broken. Unfortunately, patient pulled NG on 12/11 PM and this was not replaced.     Due to upper motor neuron findings on exam 12/12 and no history of MRI of cervical spine will plan to obtain MRI of cervical spine.      Plan    #Encephalopathy, multifactorial (suspected UTI, benzodiazepine administration), improving  #Intractable epilepsy with status epilepticus (resolved)  #Suspected prolonged post-ictal state   #Ischemic vascular dementia   #History of ischemic stroke   #Upper motor neuron reflexes  -Discontinuous vEEG monitoring  -Consider assessment for CO2 retention 2/2 to SURAJ contributing to encephalopathy, consider VBG   -Capnography monitoring   -Thiamine level  drawn  - IV thiamine x 3 days 12/11-12/3, transition to PO 12/14 100 mg tablets  -Transitioned to PO from  mg Keppra BID   -Neurochecks Q4H & vitals Q8H  -Ordered MRI of cervical spine   - PT/OT consulted     #UTI  E.coli   - Treating UTI with ceftriaxone  - Blood cultures revealed no growth as of 12/12  - Low suspicion to consult dental medicine if pt develops fevers/leukocytosis despite UTI treatment (query dental abscess with spread to bloodstream)   - COVID-19 negative  - mg tylenol Q4H for mild pain & fever   -PRN zofran 4 mg Q6H PO or IV for nausea    #CKD  -Continue maintenance NS at 100 ml/hr & 50 ml/hr  -Bladder management protocol     #HLD  #CAD   -Continue 81 mg ASA  -Continue PTA 40 mg atorvastatin daily     #Hypothyroidism  - TSH & T4 wnl, decreased T3 1.1  -Resumed on 12/12 75 mcg levothyroxine PO daily     #Hypokalemia  -K+ replacement protocol  -Given 40 meq potassium chloride 12/12    #Hypomagnesemia   -Mg2+ replacement protocol  -Mg2+ lab in AM     #Brittle Type 2 Diabetes  - Q4 hour glucose checks while NPO   - High dose insulin sliding scale with insulin aspart & glargine   - PRN dextrose 50% IV 25-50 ml, 1 mg IV glucagon, & glucose gel for acute management of hypoglycemia   -Carbohydrate counting  per nursing   - Endocrinology consult      #Elevated alkaline phosphatase   - RUQ ultrasound 12/11 negative  - Continue to monitor      #HTN  Baseline on Coreg and doxazosin. Was intermittently quite hypertensive in the evening 12/10 going into 12/11. Required 50 mg IV labetalol and 5 mg IV hydralazine to control. BP much improved during day. Unclear why BP was so elevated. No signs/symptoms of pain. Resumed oral antihypertensives due to diet change to full liquids.   -Continue 12.5 mg coreg BID   -Continue 1 mg doxazosin at HS  -PRN 5 mg IV hydralazine Q6H & 10 mg labetalol IV [id number] for BP > 160/90  - Goal SBP < 180    Chronic pain  -Continue 20 mg duloxetine daily     FEN: SLP  consulted & rec full liquid diet mildly thick   Malnutrition: Patient does not meet two of the above criteria necessary for diagnosing malnutrition  BM: PRN Miralax & senna daily for constipation  PPx: Lovenox & pneumonic compression devices   Lines: PIV, midline (hard stick)   Code: Full code, per chart review.     Patient was seen and discussed with Dr. Root.     Please do not hesitate to call with questions/concerns (consult pager [phone]).      Tanika Cruz MD  PGY-1 Neurology

## 2022-12-12 NOTE — PROGRESS NOTES
12/12/22 4788   Appointment Info   Signing Clinician's Name / Credentials (SLP) Ruth Moore MS CCC-SLP   General Information   Onset of Illness/Injury or Date of Surgery 12/10/22   Referring Physician Santiago Persaud MD   Patient/Family Therapy Goal Statement (SLP) None stated   Pertinent History of Current Problem The pt is a 62 year old woman with history of type 2 diabetes, recurrent UTI, CKD, vascular dementia, and CVA who presented as a stroke code on 12/10 after daughter found her at home unresponsive with ?seizure-like activity.  CTH, CTA, and hyperacute MRI were negative for acute stroke or LVO. Patient was subsequently admitted to general neurology for suspected post-ictal state. Radiology placed bridled NG on 12/11, however, this failed to flush despite being pulled back and re-imaged twice after bridle was broken. Unfortunately, patient pulled NG on 12/11 PM and this was not replaced. She tends to pocket food. She is quiet/keeps to herself at baseline. Sometimes uses a walker. Feeds herself. She notes that Isabell had several teeth extracted this past Thursday and was unable to get the special anti-bacterial mouthwash she was supposed to use afterwards. Clinical swallow evaluation ordered per MD.   General Observations The pt is alert, fatigued, and pleasant. She has delayed responses.   Pain Assessment   Patient Currently in Pain No   Type of Evaluation   Type of Evaluation Swallow Evaluation   Oral Motor   Oral Musculature generally intact   Structural Abnormalities none present   Mucosal Quality adequate   Dentition (Oral Motor)   Dentition (Oral Motor) adequate dentition;some missing teeth   Facial Symmetry (Oral Motor)   Facial Symmetry (Oral Motor) WNL   Lip Function (Oral Motor)   Lip Range of Motion (Oral Motor) WNL   Lip Coordination (Oral Motor) bilateral;minimal impairment   Tongue Function (Oral Motor)   Tongue Coordination/Speed (Oral Motor) reduced rate   Tongue ROM (Oral Motor) WNL    Comment, Tongue Function (Oral Motor) Moderate right-sided bruising on anterior/mid surface   Jaw Function (Oral Motor)   Jaw Function (Oral Motor) WNL   Cough/Swallow/Gag Reflex (Oral Motor)   Volitional Throat Clear/Cough (Oral Motor) impaired;reduced strength   Volitional Swallow (Oral Motor) significantly delayed   Vocal Quality/Secretion Management (Oral Motor)   Vocal Quality (Oral Motor) WNL   Secretion Management (Oral Motor) WNL   General Swallowing Observations   Past History of Dysphagia The pt is familiar to inpatient SLP department. She has been seen for clinical swallow evaluation x2 and tx within the last year d/t bolus holding. She was initially recommended an easy-to-chew diet and thin liquids during last admission, however downgraded to puree solids (4) by time of discharge. The pt denies dysphagia today, however unsure of reliability as a .   Respiratory Support (General Swallowing Observations) none   Current Diet/Method of Nutritional Intake (General Swallowing Observations, NIS) NPO   Swallowing Evaluation Clinical swallow evaluation   Clinical Swallow Evaluation   Feeding Assistance minimal assistance required   Clinical Swallow Evaluation Textures Trialed thin liquids;mildly thick liquids;pureed   Clinical Swallow Eval: Thin Liquid Texture Trial   Mode of Presentation, Thin Liquids cup;spoon;self-fed   Volume of Liquid or Food Presented 3 oz   Oral Phase of Swallow delayed AP movement   Pharyngeal Phase of Swallow impaired;throat clearing;repeated swallows   Diagnostic Statement Delayed swallow initiation d/t bolus holding (>1 minute). Throat clearing following swallow intermittently.   Clinical Swallow Eval: Mildly Thick Liquids   Mode of Presentation cup;self-fed   Volume Presented 3 oz   Oral Phase delayed AP movement   Pharyngeal Phase intact   Diagnostic Statement Mildly delayed swallow initiation, however significantly improved from thin liquids. No s/s of aspiration.    Clinical Swallow Evaluation: Puree Solid Texture Trial   Mode of Presentation, Puree spoon;self-fed   Volume of Puree Presented 4 oz   Oral Phase, Puree WFL   Pharyngeal Phase, Puree intact   Diagnostic Statement No s/s of aspiration. Bolus holding x2, however pt initiated swallow with verbal cues.   Esophageal Phase of Swallow   Patient reports or presents with symptoms of esophageal dysphagia No   Swallowing Recommendations   Diet Consistency Recommendations mildly thick liquids (level 2);full liquid diet   Supervision Level for Intake 1:1 supervision needed   Mode of Delivery Recommendations bolus size, small;slow rate of intake   Swallowing Maneuver Recommendations alternate food and liquid intake   Monitoring/Assistance Required (Eating/Swallowing) stop eating activities when fatigue is present;monitor for cough or change in vocal quality with intake   Recommended Feeding/Eating Techniques (Swallow Eval) maintain upright sitting position for eating;provide assist with feeding   Medication Administration Recommendations, Swallowing (SLP) Crushed with puree   Instrumental Assessment Recommendations instrumental evaluation not recommended at this time   General Therapy Interventions   Planned Therapy Interventions Dysphagia Treatment   Dysphagia treatment Modified diet education;Instruction of safe swallow strategies;Compensatory strategies for swallowing   Clinical Impression   Criteria for Skilled Therapeutic Interventions Met (SLP Eval) Yes, treatment indicated   SLP Diagnosis Moderate oropharyngeal dysphagia   Risks & Benefits of therapy have been explained evaluation/treatment results reviewed;care plan/treatment goals reviewed;risks/benefits reviewed;current/potential barriers reviewed;participants voiced agreement with care plan;participants included;patient   Clinical Impression Comments Clinical swallow evaluation completed per MD order. The pt presents with moderate oropharyngeal dysphagia in setting  of AMS. Oral mech significant for generalized weakness, some missing teeth, and moderate right-sided lingual bruising. She consumed thin liquids by spoon/cup with significant bolus holding (>1 minute) and variable response to verbal cues to initiate swallow. Thin liquids resulted in intermittent throat clearing d/t suspected premature spillage to pharynx. She tolerated sips of mildly thick liquids and bites of puree solid with no s/s of aspiration. Oral phase was mildly prolonged, however no oral residuals present. The pt denied difficulty with swallowing. Recommend full liquid diet with mildly thick liquids (2). Upright with PO, small bites/sips, slow pace, and hold PO if prolonged bolus holding or s/s of aspiration. If bolus holding, verbally cue pt to swallow. SLP will follow for dysphagia.   SLP Total Evaluation Time   Eval: oral/pharyngeal swallow function, clinical swallow Minutes (29218) 20   SLP Discharge Planning   SLP Plan Diet tolerance, advance as appropriate   SLP Discharge Recommendation home with home care speech therapy;Transitional Care Facility   SLP Rationale for DC Rec Swallow function is below baseline   SLP Brief overview of current status  Recommend full liquid diet with mildly thick liquids (2). Upright with PO, small bites/sips, slow pace, and hold PO if prolonged bolus holding or s/s of aspiration. If bolus holding, verbally cue pt to swallow. SLP will follow for dysphagia given intermittent s/s of aspiration with thin liquids today.

## 2022-12-12 NOTE — PLAN OF CARE
Status: Admitted 12/10 after being found down/unresponsive, suspected post-ictal state, VEEG monitoring. Hx vascular dementia, multiple strokes, CAD, CKD 3, brittle DM 1  Vitals: VSS, afebrile. SBP parameter <180.  Neuros: A/ox person and sometimes place(able to state hospital, but not location of hospital). Speech slow, minimal, garbled. Strengths 4/5 throughout. Follows commands, slow to respond. Nystagmus to both eyes.   IV: Midline 2L infusing NS at 100 ml/hr to purple lumen. Red lumen flushes/good blood return. PIV L FA SL, blood return.  Labs/Electrolytes: AM labs.  Resp/trach: LS clear, dim. Frequent congested good cough. Continuous pulse oximeter sats WNL. Oral cares completed q2h.  Diet: Strict NPO. SLP consult pending. BG q4h, given novolog per orders.  Bowel status: LBM PTA. BS +.   : Retaining 480 ml urine. Straight cathed for 525 ml.    Skin: Pale, generalized bruising/redness blanchable. Coccyx friction abrasion. Right facial swelling, tongue bruised/swollen from biting. Poor dentition d/t food pocketing per daughter, recently 3 teeth pulled on right side.  Pain: Non verbal indicators absent.  Activity: Turns q2h. Lift. Obtained bed weight this shift.  Social: Daughter is pt's PCA at home-not present at bedside this shift.  Plan: Continue current POC. Continue VEEG.  Updates this shift: Pt pulling VEEG leads-wrapped head with ace wrap-effective. Pulled out NG tube-team notified, no new orders.

## 2022-12-12 NOTE — CONSULTS
"NEW INPATIENT DIABETES MANAGEMENT CONSULT  Isabell Matthews  Age: 62 year old  MRN # [id number]   YOB: 1960    Chief Complaint: Concern for seizure, post ictal state, possible UTI  Reason for Consult: brittle type 2 diabetes - assist with inpatient management of glucose. Currently NPO, hoping to start TF on 12/12  Consulting Provider: Santiago Persaud        History of Present Illness:    Isabell Matthews is a 61 year old female, with h/o  seizure disorder, type 2 diabetes treated as type 1, CKD3, HTN, HLD, CAD s/p PCI, hypothyroidism, vascular dementia, multiple prior ischemic strokes and last  acute ischemic stroke of right frontoparietal punctate stroke due to small-vessel occlusion (was hospitalized4/5/22), presenting with possible seizure, imaging negative for stroke, post ictal state and encephalopathy.  Patient unable to answer questions so called her daughter, caretaker Mel and spoke with her on the phone.  She says Lisa is a \"brittle diabetic\".  She had several teeth removed prior to this admission so was eating mac and cheese and her BG running higher.  When ever her mom gets \"sick her BG are the first to go, they get higher\".  Her BG are  fasting and when she checks before meals can be 200-300.  Daughter knows her A1C=10 but she says\" Can go from 170 to 40 in 1 hour\" because everyonce in a while her pancreas kicks in.runs  in am and sometimes after meals runs 200-300- she does not follow her sliding scale-if she is 201-320 she still give 2 units. She has a good appetite.She eats 2 meals per day. In the morning she often eats a breakfast sandwich.  She give her 20 units of lantus at night. She says since 1 year ago after one of her ischemic strokes she has dysphagia.  She has had frequent UTI.      Other Active Medical Problems: Possible UTI, encephalopathy,   Diabetes Mellitus Type: Insulin dependent Type 2 diabetes  2011-\"She presented  overnight on April 3, 2011, due to " "increasing nausea, vomiting, and was found to be in diabetic ketoacidosis. She was admitted to the intensive care unit, put on an insulin drip, and was followed by Dr. Parry, endocrinology. After the resolution of her ketoacidosis, she was started on an insulin regimen with Lantus and NovoLog and was told  that she has type 1 diabetes and will always require insulin\"  Duration:    24 years  Diabetic Complications: Peripheral neuropathy,  nephropathy  Prior to Admission Diabetes Regimen:   Lantus 20 units at night and and does not follow sliding short acting scale in computer: for 201-320- give 2 units, very worried about her getting low     BG monitor: finger sticks with glucose meter  Frequency of checks: 4 times daily  Diet: prior to admission she had her teeth pulled and so was eating less, softer foods- was eating mac and cheese  Usual BG control PTA:  \"brittle Diabetic\"  Can go from 170 to 40 in 1 hour, runs  in am and sometimes after meals runs 200-300- she does not follow  with A1c 10.3 on  10/31/2022  History of DKA:  Yes, last on 5/2022 admitted in DKA  Able to Detect Hypoglycemia: impaired due to dementia/dysphagia per her daughter  Usual Diabetes Care Provider: Dr Castaneda  Primary Care Provider: Bony Castaneda, last office visit on 5/24/22 and was scheduled on 6/11 but was no show  Factors Impacting IP Glucose Control: UTI, chronic hyperglycemia, cognitive impairment, recent tooth removal  Current Diet: NPO, strict but per primary team she passed her swallow test- as of late morning full liquid diet mildly thick    10 point ROS completed with pertinent positives and negatives noted in the HPI--- mostly cannot tell me answers as she isn't speaking  Past medical, family and social histories are reviewed and updated.  PMH:  Chronic pain        Coronary atherosclerosis 6/14/2016     Essential hypertension       Ex-cigarette smoker       quit 7/2018 over 35 years     Ex-cigarette smoker       " "Hyperlipidemia       Hypothyroid       Seizures (H)       Stroke (H)       Vascular dementia (H)        Social History    Tobacco: Former smoker    Alcohol: not currently    Marital Status:     Place of Residence: Lives with daughter    Occupation: did not ask    Family History  No diabetes documented       Physical Exam   BP (!) 157/69 (BP Location: Left arm)   Pulse 90   Temp 98.5  F (36.9  C) (Oral)   Resp 18   Ht 1.651 m (5' 5\")   Wt 56.7 kg (125 lb)   SpO2 97%   BMI 20.80 kg/m    General: opens eyes to voice and looks at you, EEG leads in place, smiles, she nodded her head once but did not speak   HEENT: normocephalic, atraumatic. Oral mucous membranes dry  Lungs: unlabored respiration, no cough  ABD: rounded, non distended, soft nontender  Skin: warm and dry, no obvious lesions on exposed skin  MSK:  moves all extremities  Lymp:  some LE edema   Mental status:  follow commands, squeezes hand and wiggles toes  Psych:   calm     Most Recent Laboratory Tests:  Recent Labs   Lab 12/11/22  0720   HGB 10.7*     No results for input(s): A1C in the last 168 hours.  Recent Labs   Lab 12/11/22  0720   CR 1.40*     Recent Labs   Lab 12/12/22  0553 12/12/22  0251 12/11/22  2114 12/11/22  1745 12/11/22  1245 12/11/22  0824   * 295* 244* 176* 251* 273*       ROUTINE IP LABS (Last four results)  BMPRecent Labs   Lab 12/12/22  1033 12/12/22  0809 12/12/22  0553 12/12/22  0251 12/11/22  0824 12/11/22  0720 12/10/22  1912 12/10/22  1138 12/10/22  1133   NA  --  143  --   --   --  139  --  141 142   POTASSIUM  --  3.3*  --   --   --  3.7  --  4.2 4.4   CHLORIDE  --  112*  --   --   --  106  --   --  104   VERONICA  --  8.2*  --   --   --  8.5*  --   --  9.7   CO2  --  18*  --   --   --  19*  --   --  24   BUN  --  21.7  --   --   --  24.0*  --   --  26.5*   CR  --  1.39*  --   --   --  1.40*  --   --  1.18*  1.3*   * 147* 175* 295*   < > 325*   < > 147* 152*    < > = values in this interval not " displayed.     CBC  Recent Labs   Lab 12/11/22  0720 12/10/22  1138 12/10/22  1133   WBC 6.9  --  8.0   RBC 3.75*  --  4.48   HGB 10.7* 13.3 13.0   HCT 33.8* 39 39.8   MCV 90  --  89   MCH 28.5  --  29.0   MCHC 31.7  --  32.7   RDW 13.7  --  13.3     --  185     INR  Recent Labs   Lab 12/10/22  1133 12/10/22  1132   INR 1.00 1.1*             Reviewed BG:    Ms. Matthews is a 62 year old woman with history of type 2 diabetes that is insulin dependent, recurrent UTI, CKD, vascular dementia, and CVA who presented as a stroke code on 12/10 after daughter found her at home unresponsive with ?seizure-like activity.  CTH, CTA, and hyperacute MRI were negative for acute stroke or LVO. Patient was subsequently admitted to general neurology for suspected post-ictal state/encephlopathy.        Assessment:   1.  Type 2 insulin dependent, sub optimally controlled, HgbA1c=10.3   2. EVON, creat=1.39  3. Anemia  4. Stress induced hyperglycemia    Plan    - added Novolog for carb intake 1 unit per 15 grams AC, HS and PRN snacks  - Late in the am, just now eating, will give NPH 8 units and then give her lantus dose to be determined  -Addendum:  Lantus 20 units at 18:00  - BG every 4 hours for now, just gave her a liquid diet- hypoglycemia protocol  -Change to medium sliding scale for BG >140      Discussed plan of care with patient and daughter, nursing, and primary team per this note  Thank you for this consult; Inpatient Diabetes will continue to follow.     To contact Endocrine Diabetes service:   From 8AM-4PM: page inpatient diabetes provider that is following the patient  For questions or updates from 4PM-8AM: page the diabetes job code for on call fellow: [phone]    Review of prior external note(s) from - [facility] or Care Everywhere   80 minutes spent on the date of the encounter doing chart review, history and exam, documentation and further activities per the note     Over 50% of my time on the unit was spent counseling the  patient and/or coordinating care regarding acute hyperglycemia management.  See note for details.         Marisela Dominguez PA-C  Inpatient Diabetes Management Service  Pager [phone]  12/12/2022

## 2022-12-12 NOTE — PLAN OF CARE
Status: Admitted 12/10 after being found down/unresponsive, suspected post-ictal state  Vitals: VSS  Neuros: oriented to self only, Speech slow, minimal, garbled. Strengths 4/5 throughout. Follows commands, slow to respond. Nystagmus  IV: PIV Infusing  ml/hr  Resp/trach: On RA  Diet: Worked with SLP and was advanced to a Full Liq mildly thick liquid. Good po-Feeder, pockets   Bowel status: LBM 12/12  : Walked to the bathroom with 1x assist and GB, voided x2, PVR was 160 ml  Skin: Tongue bruised/swollen from biting  Pain: Denies   Activity: Ax1. GB, walker  Social: daughter was at bedside -supportive   Plan:Continue to monitor and follow POC  Updates this shift: MRI checklist was sent this shift

## 2022-12-13 ENCOUNTER — DOCUMENTATION ONLY (OUTPATIENT)
Dept: FAMILY MEDICINE | Facility: CLINIC | Age: 62
End: 2022-12-13

## 2022-12-13 ENCOUNTER — APPOINTMENT (OUTPATIENT)
Dept: SPEECH THERAPY | Facility: CLINIC | Age: 62
DRG: 101 | End: 2022-12-13
Payer: MEDICARE

## 2022-12-13 ENCOUNTER — APPOINTMENT (OUTPATIENT)
Dept: MRI IMAGING | Facility: CLINIC | Age: 62
DRG: 101 | End: 2022-12-13
Payer: MEDICARE

## 2022-12-13 LAB
ANION GAP SERPL CALCULATED.3IONS-SCNC: 8 MMOL/L (ref 7–15)
BUN SERPL-MCNC: 11.5 MG/DL (ref 8–23)
CALCIUM SERPL-MCNC: 8.2 MG/DL (ref 8.8–10.2)
CHLORIDE SERPL-SCNC: 110 MMOL/L (ref 98–107)
CREAT SERPL-MCNC: 1.24 MG/DL (ref 0.51–0.95)
DEPRECATED HCO3 PLAS-SCNC: 22 MMOL/L (ref 22–29)
ERYTHROCYTE [DISTWIDTH] IN BLOOD BY AUTOMATED COUNT: 13.6 % (ref 10–15)
GFR SERPL CREATININE-BSD FRML MDRD: 48 ML/MIN/1.73M2
GLUCOSE BLDC GLUCOMTR-MCNC: 125 MG/DL (ref 70–99)
GLUCOSE BLDC GLUCOMTR-MCNC: 190 MG/DL (ref 70–99)
GLUCOSE BLDC GLUCOMTR-MCNC: 217 MG/DL (ref 70–99)
GLUCOSE BLDC GLUCOMTR-MCNC: 257 MG/DL (ref 70–99)
GLUCOSE BLDC GLUCOMTR-MCNC: 272 MG/DL (ref 70–99)
GLUCOSE SERPL-MCNC: 179 MG/DL (ref 70–99)
HCT VFR BLD AUTO: 32.9 % (ref 35–47)
HGB BLD-MCNC: 10.6 G/DL (ref 11.7–15.7)
MAGNESIUM SERPL-MCNC: 1.5 MG/DL (ref 1.7–2.3)
MAGNESIUM SERPL-MCNC: 2.8 MG/DL (ref 1.7–2.3)
MCH RBC QN AUTO: 29 PG (ref 26.5–33)
MCHC RBC AUTO-ENTMCNC: 32.2 G/DL (ref 31.5–36.5)
MCV RBC AUTO: 90 FL (ref 78–100)
PLATELET # BLD AUTO: 154 10E3/UL (ref 150–450)
POTASSIUM SERPL-SCNC: 3.2 MMOL/L (ref 3.4–5.3)
POTASSIUM SERPL-SCNC: 4 MMOL/L (ref 3.4–5.3)
RBC # BLD AUTO: 3.65 10E6/UL (ref 3.8–5.2)
SODIUM SERPL-SCNC: 140 MMOL/L (ref 136–145)
WBC # BLD AUTO: 4.4 10E3/UL (ref 4–11)

## 2022-12-13 PROCEDURE — 258N000003 HC RX IP 258 OP 636

## 2022-12-13 PROCEDURE — G1010 CDSM STANSON: HCPCS

## 2022-12-13 PROCEDURE — 258N000003 HC RX IP 258 OP 636: Performed by: STUDENT IN AN ORGANIZED HEALTH CARE EDUCATION/TRAINING PROGRAM

## 2022-12-13 PROCEDURE — 36592 COLLECT BLOOD FROM PICC: CPT | Performed by: STUDENT IN AN ORGANIZED HEALTH CARE EDUCATION/TRAINING PROGRAM

## 2022-12-13 PROCEDURE — 99232 SBSQ HOSP IP/OBS MODERATE 35: CPT | Mod: GC | Performed by: STUDENT IN AN ORGANIZED HEALTH CARE EDUCATION/TRAINING PROGRAM

## 2022-12-13 PROCEDURE — 92526 ORAL FUNCTION THERAPY: CPT | Mod: GN

## 2022-12-13 PROCEDURE — 250N000011 HC RX IP 250 OP 636: Performed by: STUDENT IN AN ORGANIZED HEALTH CARE EDUCATION/TRAINING PROGRAM

## 2022-12-13 PROCEDURE — 250N000013 HC RX MED GY IP 250 OP 250 PS 637: Performed by: STUDENT IN AN ORGANIZED HEALTH CARE EDUCATION/TRAINING PROGRAM

## 2022-12-13 PROCEDURE — 72156 MRI NECK SPINE W/O & W/DYE: CPT | Mod: 26 | Performed by: STUDENT IN AN ORGANIZED HEALTH CARE EDUCATION/TRAINING PROGRAM

## 2022-12-13 PROCEDURE — 255N000002 HC RX 255 OP 636: Performed by: STUDENT IN AN ORGANIZED HEALTH CARE EDUCATION/TRAINING PROGRAM

## 2022-12-13 PROCEDURE — 250N000011 HC RX IP 250 OP 636

## 2022-12-13 PROCEDURE — 84132 ASSAY OF SERUM POTASSIUM: CPT | Performed by: STUDENT IN AN ORGANIZED HEALTH CARE EDUCATION/TRAINING PROGRAM

## 2022-12-13 PROCEDURE — A9585 GADOBUTROL INJECTION: HCPCS | Performed by: STUDENT IN AN ORGANIZED HEALTH CARE EDUCATION/TRAINING PROGRAM

## 2022-12-13 PROCEDURE — 36592 COLLECT BLOOD FROM PICC: CPT

## 2022-12-13 PROCEDURE — 85027 COMPLETE CBC AUTOMATED: CPT

## 2022-12-13 PROCEDURE — 99233 SBSQ HOSP IP/OBS HIGH 50: CPT | Performed by: PHYSICIAN ASSISTANT

## 2022-12-13 PROCEDURE — 120N000002 HC R&B MED SURG/OB UMMC

## 2022-12-13 PROCEDURE — 83735 ASSAY OF MAGNESIUM: CPT | Performed by: STUDENT IN AN ORGANIZED HEALTH CARE EDUCATION/TRAINING PROGRAM

## 2022-12-13 PROCEDURE — 80048 BASIC METABOLIC PNL TOTAL CA: CPT

## 2022-12-13 PROCEDURE — G1010 CDSM STANSON: HCPCS | Mod: GC | Performed by: STUDENT IN AN ORGANIZED HEALTH CARE EDUCATION/TRAINING PROGRAM

## 2022-12-13 RX ORDER — POTASSIUM CHLORIDE 20MEQ/15ML
40 LIQUID (ML) ORAL ONCE
Status: COMPLETED | OUTPATIENT
Start: 2022-12-13 | End: 2022-12-13

## 2022-12-13 RX ORDER — MAGNESIUM SULFATE HEPTAHYDRATE 40 MG/ML
4 INJECTION, SOLUTION INTRAVENOUS ONCE
Status: COMPLETED | OUTPATIENT
Start: 2022-12-13 | End: 2022-12-13

## 2022-12-13 RX ORDER — CEFPODOXIME PROXETIL 200 MG/1
200 TABLET, FILM COATED ORAL 2 TIMES DAILY
Status: DISCONTINUED | OUTPATIENT
Start: 2022-12-14 | End: 2022-12-13

## 2022-12-13 RX ORDER — GADOBUTROL 604.72 MG/ML
7.5 INJECTION INTRAVENOUS ONCE
Status: COMPLETED | OUTPATIENT
Start: 2022-12-13 | End: 2022-12-13

## 2022-12-13 RX ORDER — CEFPODOXIME PROXETIL 200 MG/1
200 TABLET, FILM COATED ORAL 2 TIMES DAILY
Status: DISCONTINUED | OUTPATIENT
Start: 2022-12-13 | End: 2022-12-14 | Stop reason: HOSPADM

## 2022-12-13 RX ADMIN — GADOBUTROL 5.5 ML: 604.72 INJECTION INTRAVENOUS at 08:22

## 2022-12-13 RX ADMIN — CEFPODOXIME PROXETIL 200 MG: 200 TABLET, FILM COATED ORAL at 20:12

## 2022-12-13 RX ADMIN — SODIUM CHLORIDE, POTASSIUM CHLORIDE, SODIUM LACTATE AND CALCIUM CHLORIDE 1000 ML: 600; 310; 30; 20 INJECTION, SOLUTION INTRAVENOUS at 14:51

## 2022-12-13 RX ADMIN — CARVEDILOL 12.5 MG: 6.25 TABLET, FILM COATED ORAL at 08:55

## 2022-12-13 RX ADMIN — FAMOTIDINE 10 MG: 10 TABLET ORAL at 20:12

## 2022-12-13 RX ADMIN — ENOXAPARIN SODIUM 40 MG: 40 INJECTION SUBCUTANEOUS at 20:42

## 2022-12-13 RX ADMIN — THIAMINE HYDROCHLORIDE 500 MG: 100 INJECTION, SOLUTION INTRAMUSCULAR; INTRAVENOUS at 08:50

## 2022-12-13 RX ADMIN — MAGNESIUM SULFATE IN WATER 4 G: 40 INJECTION, SOLUTION INTRAVENOUS at 13:01

## 2022-12-13 RX ADMIN — ASPIRIN 81 MG CHEWABLE TABLET 81 MG: 81 TABLET CHEWABLE at 08:55

## 2022-12-13 RX ADMIN — POTASSIUM CHLORIDE 40 MEQ: 40 SOLUTION ORAL at 13:29

## 2022-12-13 RX ADMIN — DOXAZOSIN 1 MG: 1 TABLET ORAL at 21:36

## 2022-12-13 RX ADMIN — CARVEDILOL 12.5 MG: 6.25 TABLET, FILM COATED ORAL at 17:42

## 2022-12-13 RX ADMIN — LEVOTHYROXINE SODIUM 75 MCG: 75 TABLET ORAL at 11:12

## 2022-12-13 RX ADMIN — LEVETIRACETAM 500 MG: 500 TABLET, FILM COATED ORAL at 20:12

## 2022-12-13 RX ADMIN — ATORVASTATIN CALCIUM 40 MG: 40 TABLET, FILM COATED ORAL at 08:55

## 2022-12-13 RX ADMIN — FAMOTIDINE 10 MG: 10 TABLET ORAL at 08:56

## 2022-12-13 RX ADMIN — THIAMINE HYDROCHLORIDE 500 MG: 100 INJECTION, SOLUTION INTRAMUSCULAR; INTRAVENOUS at 20:42

## 2022-12-13 RX ADMIN — LEVETIRACETAM 500 MG: 500 TABLET, FILM COATED ORAL at 08:55

## 2022-12-13 RX ADMIN — DULOXETINE 20 MG: 20 CAPSULE, DELAYED RELEASE ORAL at 08:55

## 2022-12-13 RX ADMIN — CEFPODOXIME PROXETIL 200 MG: 200 TABLET, FILM COATED ORAL at 11:14

## 2022-12-13 ASSESSMENT — ACTIVITIES OF DAILY LIVING (ADL)
ADLS_ACUITY_SCORE: 50
ADLS_ACUITY_SCORE: 44
ADLS_ACUITY_SCORE: 50
DEPENDENT_IADLS:: CLEANING;COOKING;LAUNDRY;SHOPPING;MEAL PREPARATION;MEDICATION MANAGEMENT;MONEY MANAGEMENT;TRANSPORTATION;INCONTINENCE
ADLS_ACUITY_SCORE: 49
ADLS_ACUITY_SCORE: 50
ADLS_ACUITY_SCORE: 49
ADLS_ACUITY_SCORE: 49
ADLS_ACUITY_SCORE: 50

## 2022-12-13 NOTE — PLAN OF CARE
Vitals: HTN, within parameters.   Neuros: Orientated to self.  Speech slow, minimal, garbled. Strengths 4/5 throughout. Follows commands, slow to respond. Nystagmus  IV: Midline infusing NS at 50 ml/hr. PIV SLd  Labs/Electrolytes: K and Mg replaced. Mg redraw at 1800  Resp/trach: WNL  Diet: Full Liq mildly thick liquid. Good po-Feeder, pockets   Bowel status: BM 12/12   : Voided multiple times in bathroom   Skin: Tongue bruised/swollen from biting. Bruising throughout extremities   Pain: Denies  Activity: SBA, GB. Sat in chair this afternoon and walked halls with RN   Social: NA  Updates this shift: Md said possibility of discharging home this evening.

## 2022-12-13 NOTE — CONSULTS
Care Management Initial Consult    General Information  Assessment completed with: CaregiverVishal  Type of CM/SW Visit: Initial Assessment    Primary Care Provider verified and updated as needed: Yes   Readmission within the last 30 days:     Reason for Consult: discharge planning  Advance Care Planning:         Communication Assessment  Patient's communication style: spoken language (English or Bilingual)      Cognitive  Cognitive/Neuro/Behavioral: .WDL except  Level of Consciousness: alert  Arousal Level: opens eyes spontaneously  Orientation: person  Mood/Behavior: calm, cooperative  Best Language: 0 - No aphasia  Speech: slow, garbled    Living Environment:   People in home: child(tiffany), adult     Current living Arrangements: apartment      Able to return to prior arrangements: yes     Family/Social Support:  Care provided by: child(tiffany) (daughter is patient's PCA)  Provides care for: no one    Current Resources:   Patient receiving home care services: No     Community Resources: County Worker, PCA  Equipment currently used at home: walker, standard, wheelchair, manual, glucometer, other (see comments) (BP machine)  Supplies currently used at home:      Functional Status:  Prior to admission patient needed assistance:   Dependent ADLs:: Ambulation-walker, Bathing, Dressing, Grooming, Incontinence, Transfers, Wheelchair-with assist, Toileting  Dependent IADLs:: Cleaning, Cooking, Laundry, Shopping, Meal Preparation, Medication Management, Money Management, Transportation, Incontinence    Additional Information:  Patient status reviewed, discussed in discharge rounds.     Call placed to Mel ucmmings (ph: [phone]) to complete initial assessment. Patient lives in an apartment with her daughter who is also her PCA for 40 hours a week. [facility]  patient has an additional PCA for 30 hrs a week. They assist patient with all cares - bathing, dressing, incontinence, medications, cooking, cleaning, shopping,  transportation. Nehal states patient is not currently receiving skilled home care services.     Nehal inquired about getting a purewick for home and incontinence supplies (chux, wipes). [facility] does not carry purewicks. Call placed to [facility], they state they do have purewicks but they are not typically covered by insurance. The initial set up is $650-$850, they would need an accessory kit every 60 day for $59.50 and 3 catheters a day at $20.16/each. Staff states Medicare does not cover incontinence products, should be covered under PMAP plan but [facility] does not accept [facility] insurance, suggest sending order elsewhere. Wipes are also not covered by insurance.    Call returned to [facility] to discuss above information, she states they are not interested in pursuing the purewick given the price or wipes if not covered by insurance. She would like chux and briefs arranged if possible.     Call placed to [facility] to inform of incontinence supplies order. They state they are able to provide but would be delivered in roughly 5 days as they do not have them on hand and need to order. They state PMAP plan should cover. They will contact daughter directly to arrange delivery.     [facility] - incontinence supplies  Ph: [phone]  Fax: [phone]    Andie Spencer, RN, BSN  6A RN Care Coordinator  Ph: [phone]   Pager: [phone]

## 2022-12-13 NOTE — PLAN OF CARE
VSS except HTN within MD parameters.  Denied pain.  Alert, oriented to self and intermittent place, has slow garbled speech, and 4/5 strengths t/o.  Following all commands.  L PIV SL.  R midline running MIVF at 50/hr.  On a full liquid mildly thick (level 2) diet with NO straws.  Attempted 2 sips overnight.  Both times pt held fluid in mouth for approximately 1 min prior to swallowing, even with cues to swallow.  No coughing with sips. Will need assist with feeding and close supervision. Voiding with intermittent incontinence.  PVR take x 1 overnight; 0 ml after double void.  Had 1 BM this shift.  Up with 1 and a GB.  Plan for cervical MRI today; checklist sent down last zan per previous RN.  K+ and Mag lab draws this AM.  Continue with POC.    Goal Outcome Evaluation:      Plan of Care Reviewed With: patient    Overall Patient Progress: improving

## 2022-12-13 NOTE — PROGRESS NOTES
Type of Form Received: RX    Form Received (Date) 12/13/22   Form Filled out Yes 12/14/22   Placed in provider folder Yes

## 2022-12-13 NOTE — PROGRESS NOTES
"  [facility]  General Neurology Progress Note    Patient Name:  Isabell Matthews  MRN:  [id number]    :  1960  Date of Service: 2022    Interval history/Subjective:   No acute events overnight. Hemodynamically stable overnight. Patient seen at bedside in AM. Patient awake and alert coloring a picture with volunteer. Exam limited due to patient's participation and underlying vascular dementia. Stated she would like use to update her son Eris.    ROS: Limited to subjective.    Physical Examination   Vitals: /73 (BP Location: Left arm)   Pulse 74   Temp (!) 95.7  F (35.4  C) (Oral)   Resp 16   Ht 1.651 m (5' 5\")   Wt 56.7 kg (125 lb)   SpO2 99%   BMI 20.80 kg/m    Physical Exam:  Constitutional: Alert. Lying in bed comfortably. No acute distress.   Head: Atraumatic, normocephalic.  Respiratory: No increased work of breathing, no accessory muscle use.   Musculoskeletal: Moving all four limbs spontaneously. Grossly intact range of motion.   Neurologic:   Mental Status: alert, oriented to person, place, and time. Speech is fluent, able to comprehend, and follows commands. Mild dysarthria.  Cranial Nerves: pupils equal, round, and reactive to light and accommodation, EOMI without nystagmus, eyes move conjugately, no saccadic movement, no skew. Visual fields full. Smile and eyebrow raise equal, blinking symmetric, no weakness. Mild right nasolabial folds flatteniinig. Hearing intact to conversation.  Motor: no atrophy or fasciculations observed. Spontaneous movement with with antigravity x4 extremities   Reflexes: Diffusely hyper reflexive & symmetric at the patellar. Positive babinski sign bilaterally.  Sensory: not assessed   Coordination: Unable to assess due to cooperation  Gait: unable to assess    Investigations   Results for orders placed or performed during the hospital encounter of 12/10/22 (from the past 24 hour(s))   Glucose by meter   Result Value Ref " Range    GLUCOSE BY METER POCT 191 (H) 70 - 99 mg/dL   Glucose by meter   Result Value Ref Range    GLUCOSE BY METER POCT 271 (H) 70 - 99 mg/dL   Glucose by meter   Result Value Ref Range    GLUCOSE BY METER POCT 217 (H) 70 - 99 mg/dL   MR Cervical Spine w/o & w Contrast    Narrative    EXAM: MR CERVICAL SPINE W/O & W CONTRAST  12/13/2022 8:34 AM     HISTORY:  upper motor neuron etiology due to physical exam findings       COMPARISON:  Cervical spine x-ray 10/27/2020, CT cervical spine  4/10/2022    TECHNIQUE: Sagittal T1-weighted and T2-weighted and axial T2-weighted  images of the cervical spine were obtained without intravenous  contrast. Post intravenous contrast using gadolinium axial and  sagittal T1-weighted images were obtained with fat saturation.    CONTRAST: 5.5 mL gadavist.    FINDINGS:  Grade 1 anterolisthesis of C3 on C4 and C7 on T1. Multilevel  intervertebral disc height narrowing, most pronounced at C4-5 and  C5-6. No cord signal abnormality. Degenerative changes with bone  marrow edema at the opposing endplates of T1-2. Otherwise normal bone  marrow.Diffusion-weighted images are negative for focal abnormality.  Postcontrast imaging does not demonstrate a worrisome enhancement .  Minimal enhancement of the C6-7 disc space and endplates of T1 and T2,  favored as degenerative in nature. No leptomeningeal enhancement.    The findings on a level by level basis are as follows:    C2-3: No spinal canal or neural foraminal stenosis. No restricted  diffusion    C3-4: Small left eccentric posterior disc osteophyte complex. Mild  spinal canal stenosis. Mild uncinate and facet hypertrophy. No neural  foraminal stenosis.    C4-5: Posterior disc osteophytic complex. Ligamentum flavum  hypertrophy. Mild to moderate spinal canal stenosis. Facet and  uncinate hypertrophy bilaterally. Mild neural foraminal narrowing  bilaterally.    C5-6: Posterior disc osteophyte complex. Ligamentum flavum  hypertrophy. Mild to  moderate spinal canal stenosis. Facet and  uncinate hypertrophy bilaterally. Mild to moderate right and moderate  left neural foraminal narrowing.    C6-7: Posterior disc osteophyte complex. Ligamentum flavum  hypertrophy. Mild to moderate spinal canal stenosis. Facet and  uncinate hypertrophy bilaterally. Mild left foraminal narrowing  bilaterally.    C7-T1: No spinal canal or neural foraminal stenosis.    The visualized skull base, posterior fossa, and paraspinal soft  tissues are within normal limits.      Impression    IMPRESSION:  1. Multilevel cervical spondylosis as detailed above. Mild to moderate  spinal canal stenosis at C3-C7 without cord compression. No  myelopathic cord signal.  2. No worrisome abnormal enhancement identified in the cervical spine.  Minimal enhancement of the C6-7 disc space and endplates of T1-T2,  favored as degenerative in nature.    I have personally reviewed the examination and initial interpretation  and I agree with the findings.    NAWAF CRAWFORD MD         SYSTEM ID:  [id number]   Glucose by meter   Result Value Ref Range    GLUCOSE BY METER POCT 125 (H) 70 - 99 mg/dL   Magnesium   Result Value Ref Range    Magnesium 1.5 (L) 1.7 - 2.3 mg/dL   CBC with platelets   Result Value Ref Range    WBC Count 4.4 4.0 - 11.0 10e3/uL    RBC Count 3.65 (L) 3.80 - 5.20 10e6/uL    Hemoglobin 10.6 (L) 11.7 - 15.7 g/dL    Hematocrit 32.9 (L) 35.0 - 47.0 %    MCV 90 78 - 100 fL    MCH 29.0 26.5 - 33.0 pg    MCHC 32.2 31.5 - 36.5 g/dL    RDW 13.6 10.0 - 15.0 %    Platelet Count 154 150 - 450 10e3/uL   Basic metabolic panel   Result Value Ref Range    Sodium 140 136 - 145 mmol/L    Potassium 3.2 (L) 3.4 - 5.3 mmol/L    Chloride 110 (H) 98 - 107 mmol/L    Carbon Dioxide (CO2) 22 22 - 29 mmol/L    Anion Gap 8 7 - 15 mmol/L    Urea Nitrogen 11.5 8.0 - 23.0 mg/dL    Creatinine 1.24 (H) 0.51 - 0.95 mg/dL    Calcium 8.2 (L) 8.8 - 10.2 mg/dL    Glucose 179 (H) 70 - 99 mg/dL    GFR Estimate 48 (L) >60  mL/min/1.73m2   Glucose by meter   Result Value Ref Range    GLUCOSE BY METER POCT 257 (H) 70 - 99 mg/dL     Assessment:  Ms. Matthews is a 62 year old woman with history of type 2 diabetes, recurrent UTI, CKD, vascular dementia, and CVA who presented as a stroke code on 12/10 after daughter found her at home unresponsive with ?seizure-like activity.  CTH, CTA, and hyperacute MRI were negative for acute stroke or LVO. Patient was subsequently admitted to general neurology for suspected post-ictal state.      Patient is on baseline levetiracetam 500 mg twice daily, and a level was obtained in the ED that was normal.  There is no concern for missed doses because the patient's daughter administers all her medications.  The patient was given 2 g of IV levetiracetam as well as a total of 3 mg of IV Ativan in the emergency department.  This seemed to improve her left gaze preference, however the patient has remained somnolent and somewhat rigid with left gaze preference (can be overcome) even through 12/11 AM. This improved as the afternoon went on. EEG read on 12/11 was negative for seizures or epileptiform discharges. There is generalized slowing between the two hemispheres. Unclear what provoked these potential seizures. vEEG discontinued 12/12 due to negative for seizures and epileptiform discharges. UA is mildly suspicious for UTI and urine culture positive for > 100,000 E. Coli colonies. Treating UTI wiith ceftriaxone. Unfortunately, patient pulled NG on 12/11 PM and this was not replaced.     Due to upper motor neuron findings on exam 12/12 and no history of MRI of cervical spine will plan to obtain MRI of cervical spine. Patient appears to be almost at baseline mentation wise.      Plan    #Encephalopathy, multifactorial (suspected UTI, benzodiazepine administration), improving  #Intractable epilepsy with status epilepticus (resolved)  #Suspected prolonged post-ictal state   #Ischemic vascular dementia   #History of  ischemic stroke   #Upper motor neuron reflexes  -Discontinuous vEEG 12/13  -Consider assessment for CO2 retention 2/2 to SURAJ contributing to encephalopathy, consider VBG   -Capnography monitoring   -Thiamine level pending  - IV thiamine x 3 days 12/11-12/3, transition to PO 12/14 100 mg tablets  -Continue  mg Keppra BID   -Neurochecks Q4H & vitals Q8H  -Pending MRI of cervical spine     #UTI  E.coli   - Transitions for IV ceftriaxone to 200 mg cefpodoxime BID for 5 days (12/13-12/17)  - Blood cultures revealed no growth as of 12/12  - Low suspicion to consult dental medicine if pt develops fevers/leukocytosis despite UTI treatment (query dental abscess with spread to bloodstream)   - COVID-19 negative  - mg tylenol Q4H for mild pain & fever   -PRN zofran 4 mg Q6H PO or IV for nausea    #CKD  -Discontinue maintenance NS at 50 ml/hr  -Ordered 1 L LR bolus given today   -Bladder management protocol     #HLD  #CAD   -Continue 81 mg ASA  -Continue PTA 40 mg atorvastatin daily     #Hypothyroidism  - TSH & T4 wnl, decreased T3 1.1  -Continue 75 mcg levothyroxine PO daily     #Hypokalemia  -K+ replacement protocol  -Given 40 meq potassium chloride 12/13    #Hypomagnesemia   -Mg2+ replacement protocol    #Acid reflux   -Continue 10 mg famotidine BID   -PRN Tums 500 mg PO daily     #Brittle Type 2 Diabetes  - PRN dextrose 50% IV 25-50 ml, 1 mg IV glucagon, & glucose gel for acute management of hypoglycemia   -Carbohydrate counting  per nursing   - Endocrinology consult    -Novolog for carb intake 1 unit per 15 grams AC, HS and PRN snacks   -NPH 8 units & Lantus 24 units at 1800 daily    - BG every 4 hours for now, hypoglycemia protocol   -Change to medium sliding scale for BG >140     #Elevated alkaline phosphatase   - RUQ ultrasound 12/11 negative  - Continue to monitor      #HTN  Baseline on Coreg and doxazosin. Was intermittently quite hypertensive in the evening 12/10 going into 12/11. Required 50 mg IV  labetalol and 5 mg IV hydralazine to control. BP much improved during day. Unclear why BP was so elevated. No signs/symptoms of pain. Resumed oral antihypertensives due to diet change to full liquids.   -Continue 12.5 mg coreg BID   -Continue 1 mg doxazosin at HS  -PRN 5 mg IV hydralazine Q6H & 10 mg labetalol IV [id number] for BP > 160/90  - Goal SBP < 180    Chronic pain  -Continue 20 mg duloxetine daily     FEN: SLP consulted: full liquid diet mildly thick   Malnutrition: Patient does not meet two of the above criteria necessary for diagnosing malnutrition  BM: PRN Miralax & senna daily for constipation  PPx: Lovenox & pneumonic compression devices   Lines: PIV, midline (hard stick)   Code: Full code, per chart review.     Patient was seen and discussed with Dr. Root.     Please do not hesitate to call with questions/concerns (consult pager [phone]).      Tanika Cruz MD  PGY-1 Neurology

## 2022-12-14 ENCOUNTER — MEDICAL CORRESPONDENCE (OUTPATIENT)
Dept: HEALTH INFORMATION MANAGEMENT | Facility: CLINIC | Age: 62
End: 2022-12-14

## 2022-12-14 ENCOUNTER — APPOINTMENT (OUTPATIENT)
Dept: SPEECH THERAPY | Facility: CLINIC | Age: 62
DRG: 101 | End: 2022-12-14
Payer: MEDICARE

## 2022-12-14 VITALS
OXYGEN SATURATION: 98 % | RESPIRATION RATE: 16 BRPM | SYSTOLIC BLOOD PRESSURE: 147 MMHG | TEMPERATURE: 98.3 F | WEIGHT: 125 LBS | HEART RATE: 88 BPM | HEIGHT: 65 IN | DIASTOLIC BLOOD PRESSURE: 62 MMHG | BODY MASS INDEX: 20.83 KG/M2

## 2022-12-14 LAB
GLUCOSE BLDC GLUCOMTR-MCNC: 111 MG/DL (ref 70–99)
GLUCOSE BLDC GLUCOMTR-MCNC: 132 MG/DL (ref 70–99)
GLUCOSE BLDC GLUCOMTR-MCNC: 136 MG/DL (ref 70–99)
GLUCOSE BLDC GLUCOMTR-MCNC: 147 MG/DL (ref 70–99)
GLUCOSE BLDC GLUCOMTR-MCNC: 15 MG/DL (ref 70–99)
GLUCOSE BLDC GLUCOMTR-MCNC: 16 MG/DL (ref 70–99)
GLUCOSE BLDC GLUCOMTR-MCNC: 170 MG/DL (ref 70–99)
GLUCOSE BLDC GLUCOMTR-MCNC: 222 MG/DL (ref 70–99)
GLUCOSE BLDC GLUCOMTR-MCNC: 263 MG/DL (ref 70–99)
HOLD SPECIMEN: NORMAL
MAGNESIUM SERPL-MCNC: 2 MG/DL (ref 1.7–2.3)
POTASSIUM SERPL-SCNC: 3.7 MMOL/L (ref 3.4–5.3)

## 2022-12-14 PROCEDURE — 250N000013 HC RX MED GY IP 250 OP 250 PS 637: Performed by: STUDENT IN AN ORGANIZED HEALTH CARE EDUCATION/TRAINING PROGRAM

## 2022-12-14 PROCEDURE — 92526 ORAL FUNCTION THERAPY: CPT | Mod: GN

## 2022-12-14 PROCEDURE — 99239 HOSP IP/OBS DSCHRG MGMT >30: CPT | Mod: GC | Performed by: STUDENT IN AN ORGANIZED HEALTH CARE EDUCATION/TRAINING PROGRAM

## 2022-12-14 PROCEDURE — 258N000001 HC RX 258: Performed by: PHYSICIAN ASSISTANT

## 2022-12-14 PROCEDURE — 36415 COLL VENOUS BLD VENIPUNCTURE: CPT | Performed by: STUDENT IN AN ORGANIZED HEALTH CARE EDUCATION/TRAINING PROGRAM

## 2022-12-14 PROCEDURE — 99233 SBSQ HOSP IP/OBS HIGH 50: CPT | Performed by: PHYSICIAN ASSISTANT

## 2022-12-14 PROCEDURE — 250N000011 HC RX IP 250 OP 636

## 2022-12-14 PROCEDURE — 84132 ASSAY OF SERUM POTASSIUM: CPT | Performed by: STUDENT IN AN ORGANIZED HEALTH CARE EDUCATION/TRAINING PROGRAM

## 2022-12-14 PROCEDURE — 83735 ASSAY OF MAGNESIUM: CPT | Performed by: STUDENT IN AN ORGANIZED HEALTH CARE EDUCATION/TRAINING PROGRAM

## 2022-12-14 RX ORDER — LEVETIRACETAM 750 MG/1
750 TABLET ORAL 2 TIMES DAILY
Status: DISCONTINUED | OUTPATIENT
Start: 2022-12-14 | End: 2022-12-14

## 2022-12-14 RX ORDER — LEVETIRACETAM 750 MG/1
750 TABLET ORAL 2 TIMES DAILY
Qty: 60 TABLET | Refills: 0 | Status: SHIPPED | OUTPATIENT
Start: 2022-12-14 | End: 2023-03-15

## 2022-12-14 RX ORDER — ACETAMINOPHEN 325 MG/10.15ML
650 LIQUID ORAL EVERY 4 HOURS PRN
Status: DISCONTINUED | OUTPATIENT
Start: 2022-12-14 | End: 2022-12-14

## 2022-12-14 RX ORDER — FAMOTIDINE 10 MG
10 TABLET ORAL 2 TIMES DAILY
Qty: 24 TABLET | Refills: 0 | Status: SHIPPED | OUTPATIENT
Start: 2022-12-14 | End: 2023-03-15

## 2022-12-14 RX ORDER — CEFPODOXIME PROXETIL 200 MG/1
200 TABLET, FILM COATED ORAL 2 TIMES DAILY
Qty: 6 TABLET | Refills: 0 | Status: SHIPPED | OUTPATIENT
Start: 2022-12-14 | End: 2023-03-15

## 2022-12-14 RX ORDER — LEVETIRACETAM 750 MG/1
750 TABLET ORAL 2 TIMES DAILY
Qty: 60 TABLET | Refills: 0 | Status: SHIPPED | OUTPATIENT
Start: 2022-12-14 | End: 2022-12-14

## 2022-12-14 RX ORDER — ACETAMINOPHEN 325 MG/1
650 TABLET ORAL EVERY 4 HOURS PRN
Status: DISCONTINUED | OUTPATIENT
Start: 2022-12-14 | End: 2022-12-14 | Stop reason: HOSPADM

## 2022-12-14 RX ORDER — LEVETIRACETAM 750 MG/1
750 TABLET ORAL 2 TIMES DAILY
Status: DISCONTINUED | OUTPATIENT
Start: 2022-12-14 | End: 2022-12-14 | Stop reason: HOSPADM

## 2022-12-14 RX ORDER — CEFPODOXIME PROXETIL 200 MG/1
200 TABLET, FILM COATED ORAL 2 TIMES DAILY
Qty: 6 TABLET | Refills: 0 | Status: SHIPPED | OUTPATIENT
Start: 2022-12-14 | End: 2022-12-14

## 2022-12-14 RX ORDER — LANOLIN ALCOHOL/MO/W.PET/CERES
100 CREAM (GRAM) TOPICAL DAILY
Qty: 30 TABLET | Refills: 0 | Status: SHIPPED | OUTPATIENT
Start: 2022-12-15 | End: 2023-03-15 | Stop reason: DRUGHIGH

## 2022-12-14 RX ORDER — FAMOTIDINE 10 MG
10 TABLET ORAL 2 TIMES DAILY
Qty: 24 TABLET | Refills: 0 | Status: SHIPPED | OUTPATIENT
Start: 2022-12-14 | End: 2022-12-14

## 2022-12-14 RX ORDER — LANOLIN ALCOHOL/MO/W.PET/CERES
100 CREAM (GRAM) TOPICAL DAILY
Qty: 30 TABLET | Refills: 0 | Status: SHIPPED | OUTPATIENT
Start: 2022-12-15 | End: 2022-12-14

## 2022-12-14 RX ADMIN — CARVEDILOL 12.5 MG: 6.25 TABLET, FILM COATED ORAL at 09:40

## 2022-12-14 RX ADMIN — LEVETIRACETAM 750 MG: 750 TABLET, FILM COATED ORAL at 10:45

## 2022-12-14 RX ADMIN — DULOXETINE 20 MG: 20 CAPSULE, DELAYED RELEASE ORAL at 09:39

## 2022-12-14 RX ADMIN — ONDANSETRON 4 MG: 2 INJECTION INTRAMUSCULAR; INTRAVENOUS at 05:16

## 2022-12-14 RX ADMIN — FAMOTIDINE 10 MG: 10 TABLET ORAL at 09:39

## 2022-12-14 RX ADMIN — CEFPODOXIME PROXETIL 200 MG: 200 TABLET, FILM COATED ORAL at 09:40

## 2022-12-14 RX ADMIN — THIAMINE HCL TAB 100 MG 100 MG: 100 TAB at 09:41

## 2022-12-14 RX ADMIN — ASPIRIN 81 MG CHEWABLE TABLET 81 MG: 81 TABLET CHEWABLE at 09:39

## 2022-12-14 RX ADMIN — DEXTROSE MONOHYDRATE 50 ML: 25 INJECTION, SOLUTION INTRAVENOUS at 04:37

## 2022-12-14 RX ADMIN — ATORVASTATIN CALCIUM 40 MG: 40 TABLET, FILM COATED ORAL at 09:39

## 2022-12-14 RX ADMIN — LEVOTHYROXINE SODIUM 75 MCG: 75 TABLET ORAL at 10:45

## 2022-12-14 ASSESSMENT — ACTIVITIES OF DAILY LIVING (ADL)
ADLS_ACUITY_SCORE: 49
ADLS_ACUITY_SCORE: 46
ADLS_ACUITY_SCORE: 47
ADLS_ACUITY_SCORE: 46
ADLS_ACUITY_SCORE: 47

## 2022-12-14 NOTE — PROVIDER NOTIFICATION
12/14/22 0400   Call Information   Date of Call 12/14/22   Time of Call 0438   Name of person requesting the team Tigist   Title of person requesting team RN   RRT Arrival time 0440   Time RRT ended 0455   Reason for call   Type of RRT Adult   Primary reason for call Other   Other reason Glucose less than 50   Was patient transferred from the ED, ICU, or PACU within last 24 hours prior to RRT call? No   SBAR   Situation Blood Sugar 16, recheck 15   Background PMH: seizures, T1DM, HTN, acquired hypothyroidism, CKD IIIb, HLD, fibromyalgia, and possible vascular dementia   Notable History/Conditions Diabetes;Hypertension;Neurological   Assessment Lethargic, will open eyes, not responding verbally (quiet voice at baseline)   Interventions Blood glucose;Other (describe)  (Bedside RN gave 1 amp dextrose, finished amp when this writer arrived)   Patient Outcome   Patient Outcome Stabilized on unit   RRT Team   Attending/Primary/Covering Physician Neurology-Patricio Mata MD   Physician(s) Leonor Mcdonald NP   Lead RN Rita Guy   RT na   Post RRT Intervention Assessment   Post RRT Assessment Stable/Improved   Date Follow Up Done 12/14/22   Time Follow Up Done 0649   Comments Blood sugar 132

## 2022-12-14 NOTE — DISCHARGE SUMMARY
[facility]  General Neurology Discharge Summary    Patient Name:  Isabell Matthews  MRN:  [id number]    :  1960  Date of Service: 2022      Date of Admission:  12/10/2022  Date of Discharge::  2022  Admitting Physician:  Darrick Root MD  encounter.  Discharge Physician:  Darrick Root MD  Primary Care Provider:   Bony Castaneda  Discharge Disposition:   Discharged to home    Admission Diagnoses:  -Seizure & post ictal state   - Encephalopathy   - UTI  - CKD I  - Diabetes type 2  - Anemia  - Dementia     Discharge Diagnoses:    - Dementia  -Intractable epilepsy with status epilepticus   -post-ictal state, resolved  -Ischemic vascular dementia  -UTI E.coli   -CKD  -HLD  -CAD  -Hypothyroidism  -Brittle Type 2 diabetes   -Hypoglycemia  -Acid reflux  -HTN  -Constipation     Brief History of Illness:   Ms. Matthews is a 62 year old woman with history of epilepsy, type 2 diabetes, recurrent UTI, CKD, vascular dementia, and CVA who presented as a stroke code on 12/10 after daughter found her at home unresponsive with seizure-like activity. Neuroimaging was done (CTH, CTA, and hyperacute MRI) which were negative for acute stroke or LVO. Patient was subsequently admitted to general neurology for suspected post-ictal state.    Hospital Course:  Ms. Matthews is a 62 year old woman with history of epilepsy, type 2 diabetes, recurrent UTI, CKD, vascular dementia, and CVA who presented as a stroke code on 12/10 after daughter found her at home unresponsive with seizure-like activity. Neuroimaging was done (CTH, CTA, and hyperacute MRI) which were negative for acute stroke or LVO. Patient was subsequently admitted to general neurology for suspected post-ictal state.     Patient is on baseline levetiracetam 500 mg twice daily, and a level was obtained in the ED that was normal.  There is no concern for missed doses because the patient's daughter administers all her  medications.  The patient was given 2 g of IV levetiracetam as well as a total of 3 mg of IV Ativan in the emergency department.  This seemed to improve her left gaze preference, however the patient has remained somnolent and somewhat rigid with left gaze preference (can be overcome) even through 12/11 AM. This improved as the afternoon went on. EEG read on 12/11 was negative for seizures or epileptiform discharges. There is generalized slowing between the two hemispheres. Unclear what provoked these potential seizures. vEEG discontinued 12/12 due to negative for seizures and epileptiform discharges. UA is mildly suspicious for UTI and urine culture positive for > 100,000 E. Coli colonies. Treating UTI wiith ceftriaxone (12/11-12/12) and was switched to cefpodoxime 200 mg BID for five days (12/13 until 12/17).     Patient did have a MRI cervical spine yesterday due to upper motor neuron findings on exam (bilateral babinski). Per imaging report there was multilevel cervical spondylosis, mild to moderate spinal canal stenosis at C3-C7 without cord compression. No myelopathic cord signal. These findings do not explain the UMN findings. It is unclear how long this has been going on. Likely due to sequela of previous cerebral infarcts.      #Encephalopathy, multifactorial (suspected UTI, benzodiazepine administration), improving  #Intractable epilepsy with status epilepticus (resolved)  #Suspected prolonged post-ictal state   #Ischemic vascular dementia   #History of ischemic stroke   #Upper motor neuron reflexes  - Discontinued vEEG monitoring  - S/P MRI of cervical spine  -Consider assessment for CO2 retention 2/2 to SURAJ contributing to encephalopathy, consider VBG ** CO2 on 12/11 was 34  - Capnography monitoring   - Thiamine level drawn (12/11) pending   - Thiamine  mg tablets  - Titrate Keppra from 500 mg BID to 750 mg PO BID      #UTI  E.coli   - Positive culture on 12/10 growing E. coli   - Treating UTI with  cefpodoxine  mg BID for 5 days. Started 12/13 until 12/17             -had IV ceftriaxone for two days prior (12/11-12/12)  - Blood cultures revealed no growth as of 12/12.   - Low suspicion to consult dental medicine if pt develops fevers/leukocytosis despite UTI treatment (query dental abscess with spread to bloodstream)   - COVID-19 negative  - mg tylenol Q4H for mild pain & fever   -PRN zofran 4 mg Q6H PO or IV for nausea     #EVON on CKD  -Discontinue maintenance NS at 50 ml/hr  -s/p1 L LR bolus 12/13  - Bladder management protocol      #HLD  #CAD   -Continue 81 mg ASA  -Continue PTA 40 mg atorvastatin daily      #Hypothyroidism  - TSH & T4 wnl, decreased T3 1.1  - Resumed on 12/12 75 mcg levothyroxine PO daily      #Hypokalemia (resolved)  - (most recent 12/14)  3.7 <- 4.0 <-3.2 <-3.4 <- 3.3 <- 3.7   - K+ replacement protocol completed 12/13  - Given 40 meq potassium chloride 12/12     #Hypomagnesemia (resolved)  - (most recent 12/14) 2.0 <- 1.5 <- 1.6 <- 1.7  - Mg2+ replacement protocol completed 12/13     #Brittle Type 2 Diabetes  #Hypoglycemia episode   Rapid response called overnight on 12/13 due to blood glucose of 15. Patient reportedly was unresponsive during this time. Received D50 and pt slowly become more alert. After 10 min glucose was 263. Head of bed and gown was wet and soiled, possible provoked seizure in setting of severe hypoglycemia.  - Medium dose insulin sliding scale   - PRN dextrose 50% IV 25-50 ml, 1 mg IV glucagon, & glucose gel for acute management of hypoglycemia   - Endocrinology increased lantus to 24 units- patient had rapid response due to critically low BG of 15.   - Continue with patients current outpatient regimen after discharge      #Elevated alkaline phosphatase   - RUQ ultrasound 12/11 negative       #Acid reflux   -Continue 10 mg famotidine BID              -continue for 2 weeks   -PRN Tums 500 mg PO daily      #HTN  Baseline on Coreg and doxazosin. Was  intermittently quite hypertensive in the evening 12/10 going into 12/11. Required 50 mg IV labetalol and 5 mg IV hydralazine to control. BP much improved during day. Unclear why BP was so elevated. No signs/symptoms of pain. Resumed oral antihypertensives due to diet change to full liquids.   -Continue 12.5 mg carvedilol (coreg) BID   -Continue 1 mg doxazosin (cardura) at HS  -PRN 5 mg IV hydralazine Q6H & 10 mg labetalol IV [id number] for BP > 160/90  - Goal SBP < 180     #Chronic pain  - Continue 20 mg duloxetine daily      #Constipation  - Last BM 12/13  - PRN Miralax & senna daily for constipation     Pertinent Investigations:  MR Cervical Spine w/o & w Contrast     Narrative     EXAM: MR CERVICAL SPINE W/O & W CONTRAST  12/13/2022 8:34 AM      HISTORY:  upper motor neuron etiology due to physical exam findings        COMPARISON:  Cervical spine x-ray 10/27/2020, CT cervical spine  4/10/2022     TECHNIQUE: Sagittal T1-weighted and T2-weighted and axial T2-weighted  images of the cervical spine were obtained without intravenous  contrast. Post intravenous contrast using gadolinium axial and  sagittal T1-weighted images were obtained with fat saturation.     CONTRAST: 5.5 mL gadavist.     FINDINGS:  Grade 1 anterolisthesis of C3 on C4 and C7 on T1. Multilevel  intervertebral disc height narrowing, most pronounced at C4-5 and  C5-6. No cord signal abnormality. Degenerative changes with bone  marrow edema at the opposing endplates of T1-2. Otherwise normal bone  marrow.Diffusion-weighted images are negative for focal abnormality.  Postcontrast imaging does not demonstrate a worrisome enhancement .  Minimal enhancement of the C6-7 disc space and endplates of T1 and T2,  favored as degenerative in nature. No leptomeningeal enhancement.     The findings on a level by level basis are as follows:     C2-3: No spinal canal or neural foraminal stenosis. No restricted  diffusion     C3-4: Small left eccentric posterior disc  osteophyte complex. Mild  spinal canal stenosis. Mild uncinate and facet hypertrophy. No neural  foraminal stenosis.     C4-5: Posterior disc osteophytic complex. Ligamentum flavum  hypertrophy. Mild to moderate spinal canal stenosis. Facet and  uncinate hypertrophy bilaterally. Mild neural foraminal narrowing  bilaterally.     C5-6: Posterior disc osteophyte complex. Ligamentum flavum  hypertrophy. Mild to moderate spinal canal stenosis. Facet and  uncinate hypertrophy bilaterally. Mild to moderate right and moderate  left neural foraminal narrowing.     C6-7: Posterior disc osteophyte complex. Ligamentum flavum  hypertrophy. Mild to moderate spinal canal stenosis. Facet and  uncinate hypertrophy bilaterally. Mild left foraminal narrowing  bilaterally.     C7-T1: No spinal canal or neural foraminal stenosis.     The visualized skull base, posterior fossa, and paraspinal soft  tissues are within normal limits.        Impression     IMPRESSION:  1. Multilevel cervical spondylosis as detailed above. Mild to moderate  spinal canal stenosis at C3-C7 without cord compression. No  myelopathic cord signal.  2. No worrisome abnormal enhancement identified in the cervical spine.  Minimal enhancement of the C6-7 disc space and endplates of T1-T2,  favored as degenerative in nature.     I have personally reviewed the examination and initial interpretation  and I agree with the findings.      MR Brain w/o contrast 12/10/2022 (copied from report)  No acute infarct or intracranial hemorrhage.     CT Head w/o contrast 12/10/2022 (copied from report)  1. No acute intracranial pathology.   2. Stable moderate chronic small vessel ischemic disease and  parenchymal volume loss, greater than expected for patient age.  3. Multiple old bilateral basal ganglia and thalamic infarcts.     CTA Head and neck w/ contrast 12/10/2022 (copied from report)  1. Head CTA demonstrates moderate tapered short segment narrowing of  the distal right M1  segment. Mild distal left M1 segment narrowing. No  intracranial arterial aneurysm.   2. Neck CTA demonstrates approximately 40% diameter stenosis of the  proximal right internal carotid artery. No left internal carotid  artery stenosis. No vertebral artery stenosis.  3. CT perfusion demonstrates no significant perfusion defect.    VEE/10/22  Summary of Day # 1 of VEEG Monitoring:  This is an abnormal EEG due to the presence of severe generalized slowing of the background activities. Findings are consistent with severe diffuse encephalopathy of which the etiology is non-specific.  No epileptiform activities or seizures were observed.  Pushed button events had no EEG correlations.    Consultations:    -Endocrinology   -SLP    Discharge Procedures:       EXTERNAL CATHETER START SET    [place]     Reason for your hospital stay    You were hospitalized for break through seizure and altered mental status.     Activity    Your activity upon discharge: activity as tolerated     Follow Up ([facility])    Follow up with primary care provider, Bony Castaneda, within 1-2 weeks, to evaluate medication change and for hospital follow- up. The following labs/tests are recommended: repeat keppra level.     Appointments on [place] and/or [facility] (with [facility] or [facility] provider or service). Call [phone] if you haven't heard regarding these appointments within 7 days of discharge.     Follow Up ([facility])    Follow up with Dr. Medina, at [facility] Neurology clinic, within 4-6 weeks  to evaluate medication change, for hospital follow- up, and to follow up on results. The following labs/tests are recommended: repeat keppra level.    Appointments on [place] and/or [facility] (with [facility] or [facility] provider or service). Call [phone] if you haven't heard regarding these appointments within 7 days of discharge.     Discharge Instructions    -Continue taking 750 mg Keppra BID orally and follow up with your neurologist  for further management.   -Follow up with your neurologist within the next 4-6 weeks for follow up after hospitalization for breakthrough seizure and check anti seizure levels at that time.  -Continue taking 100 mg of thiamine orally and follow up with PCP for further management.  -Schedule an appointment with your endocrinologist at your earliest convenience for management of type 2 DM and follow up after hypoglycemic episode while hospitalized.     Incontinence Supplies Order    I, the undersigned, certify that the above prescribed supplies are medically necessary for this patient and is both reasonable and necessary in reference to accepted standards of medical and necessary in reference to accepted standards of medical practice in the treatment of this patient's condition and is not prescribed as a convenience.      Diet    Follow this diet upon discharge: Orders Placed This Encounter      Pureed Diet (level 4) Thin Liquids (level 0)       Discharge Medications:  Discharge Medication List as of 12/14/2022  3:17 PM        CONTINUE these medications which have CHANGED    Details   cefpodoxime (VANTIN) 200 MG tablet 1 tablet (200 mg) by Oral or Feeding Tube route 2 times daily, Disp-6 tablet, R-0, E-Prescribe      famotidine (PEPCID) 10 MG tablet Take 1 tablet (10 mg) by mouth 2 times daily, Disp-24 tablet, R-0, E-Prescribe      levETIRAcetam (KEPPRA) 750 MG tablet 1 tablet (750 mg) by Oral or NG Tube route 2 times daily, Disp-60 tablet, R-0, E-Prescribe      thiamine (B-1) 100 MG tablet Take 1 tablet (100 mg) by mouth daily, Disp-30 tablet, R-0, E-Prescribe           CONTINUE these medications which have NOT CHANGED    Details   atorvastatin (LIPITOR) 40 MG tablet Take 1 tablet (40 mg) by mouth daily, Disp-90 tablet, R-3, E-Prescribe      blood glucose (NO BRAND SPECIFIED) test strip Use to test blood sugar 4-5 times daily or as directed., Disp-400 strip, R-3, E-Prescribe      carvedilol (COREG) 12.5 MG tablet  Take 1 tablet (12.5 mg) by mouth 2 times daily (with meals), Disp-180 tablet, R-3, E-Prescribe      chlorhexidine (PERIDEX) 0.12 % solution Swish and spit 30 mL once daily for 14 days., Historical      cyanocobalamin (VITAMIN B-12) 1000 MCG tablet Take 1 tablet (1,000 mcg) by mouth daily, Disp-100 tablet,R-3, E-Prescribe      diclofenac (VOLTAREN) 1 % topical gel Apply 2 g topically 4 times daily as needed for moderate pain, Disp-150 g, R-1, E-Prescribe      doxazosin (CARDURA) 1 MG tablet Take 1 tablet (1 mg) by mouth At Bedtime, Disp-90 tablet, R-3, E-Prescribe      DULoxetine (CYMBALTA) 20 MG capsule Take 1 capsule (20 mg) by mouth daily, Disp-90 capsule, R-3, E-Prescribe      insulin glargine (LANTUS SOLOSTAR) 100 UNIT/ML pen INJECT 20 UNITS SUBCUTANEOUS AT BEDTIME, Disp-30 mL, R-11, E-PrescribeIf Lantus is not covered by insurance, may substitute Basaglar or Semglee or other insulin glargine product per insurance preference at same dose and frequency.        insulin pen needle (31G X 8 MM) 31G X 8 MM miscellaneous Use 4 pen needles daily or as directed.Disp-300 each,[id number]      loratadine (CLARITIN) 10 mg tablet [LORATADINE (CLARITIN) 10 MG TABLET] Take 10 mg by mouth daily., Historical      melatonin 10 mg Tab Take 10 mg by mouth nightly as needed, Historical      SYNTHROID 75 MCG tablet Take 1 tablet (75 mcg) by mouth daily, Disp-90 tablet, R-3, RAMÍREZ, E-PrescribeDiscontinue previous dose      Vitamin D3 (CHOLECALCIFEROL) 25 mcg (1000 units) tablet Take 1 tablet (25 mcg) by mouth daily, Disp-100 tablet,R-3, E-Prescribe      aspirin (ASA) 81 MG chewable tablet Take 1 tablet (81 mg) by mouth daily, Disp-30 tablet, R-0, Local Print      insulin lispro (HUMALOG) 100 UNIT/ML vial Inject 1-10 Units Subcutaneous 3 times daily (before meals) Correction Scale - HIGH INSULIN RESISTANCE DOSING: Do Not give Correction Insulin if Pre-Meal BG less than 140. For Pre-Meal  - 164 give 1 unit. For Pre-Meal  -  189 give 2 units. For  Pre-Meal  - 214 give 3 units. For Pre-Meal  - 239 give 4 units. For Pre-Meal  - 264 give 5 units. For Pre-Meal  - 289 give 6 units. For Pre-Meal  - 314 give 7 units. For Pre-Meal  - 339 give 8 units. For Pre-Meal BG 3 40 - 364 give 9 units. For Pre-Meal BG greater than or equal to 365 give 10 units.To be given with prandial insulin, and based on pre-meal blood glucose. Notify provider if glucose greater than or equal to 350 mg/dL after administration of correction dos e. If given at mealtime, administer within 30 minutes of start of meal., Disp-9 mL, R-0, E-Prescribe      insulin lispro (HUMALOG) 100 UNIT/ML vial Inject 1-7 Units Subcutaneous At Bedtime HIGH INSULIN RESISTANCE DOSING: Do Not give Bedtime Correction Insulin if BG less than 200. For  - 224 give 1 units. For  - 249 give 2 units. For  - 274 give 3 units. For  - 299 give 4 unit s. For  - 324 give 5 units. For  - 349 give 6 units. For BG greater than or equal to 350 give 7 units. Notify provider if glucose greater than or equal to 350 mg/dL after administration of correction dose. If given at mealtime, administer wit hin 30 minutes of start of meal., Disp-2.1 mL, R-0, E-Prescribe           STOP taking these medications       amoxicillin (AMOXIL) 500 MG capsule Comments:   Reason for Stopping:               Medication changes:    -Increased to 750 mg Keppra BID   -Added 100 mg of PO thiamine daily   -Added 200 mg cefpodoxime BID for 5 days   -Added 10 mg famotidine BID for 2 week course     Discharge physical examination:   Vitals:  B/P: 147/62, T: 98.3, P: 88, R: 16  General:  Adult, in NAD, cooperative  Extremities:  No LE swelling.    Skin:  No rash or lesion.    Psych:  Mood pleasant, affect congruent  Neuro:  Neurologic:   Mental Status: alert, oriented to person, place, and time. Speech is fluent, able to comprehend, and follows commands. Mild  dysarthria.  Cranial Nerves: pupils equal, round, and reactive to light and accommodation, EOMI without nystagmus, eyes move conjugately, no saccadic movement, no skew. Visual fields full. Smile and eyebrow raise equal, blinking symmetric, no weakness. Mild right nasolabial folds flatteniinig. Hearing intact to conversation.  Motor: no atrophy or fasciculations observed. Spontaneous movement with with antigravity x4 extremities   Reflexes: Diffusely hyper reflexive & symmetric at the patellar. Positive babinski sign bilaterally.  Sensory: not assessed   Coordination: Unable to assess due to cooperation  Gait: unable to assess    Discharge follow up and instructions:    1.  Diet: Puree with thin liquids per SLP recs   2.  Activity:  Activity as tolerated  3.  Restrictions:  None   4.  Follow up:    -Follow up with your neurologist within the next 4-6 weeks for follow up after hospitalization for breakthrough seizure and check anti seizure levels at that time.  -Continue taking 100 mg of thiamine orally and follow up with PCP for further management.  -Schedule an appointment with your endocrinologist at your earliest convenience for management of type 2 DM and follow up after hypoglycemic episode while hospitalized.    A total of 60 minutes was spent on discharge activities. Patient and family counseled on discharge instructions and all questions answered.     Patient seen and discussed with Dr. Root.    Tanika Cruz MD  PGY-1 Neurology

## 2022-12-14 NOTE — PLAN OF CARE
Vitals: HTN, within parameters.   Neuros: Lethargic/alert. Orientated to self.  Speech slow, minimal, garbled. Strengths 4/5 throughout. Follows commands, slow to respond. Nystagmus  IV: Midline SLd  Labs/Electrolytes: WNL  Resp/trach: WNL  Diet: Full Liq mildly thick liquid. Good po-Feeder, pockets   Bowel status: BM 12/12  Skin: Tongue bruised/swollen from biting. Bruising throughout extremities   Pain: Denies  Activity: SBA, GB. Sat in chair this afternoon and walked halls with NA  Social: Daughter updated this am by RN   Updates this shift: Possible discharge this evening     1540 - Pt is discharging at this time. Discharge medications and instructions gone over with daughter. Questions answered at that time. Pt midline was removed and daughter helped her get dressed. NST brought pt to pharmacy and front door with daughter.

## 2022-12-14 NOTE — PROGRESS NOTES
"INPATIENT DIABETES Daily Note  Isabell Matthews  Age: 62 year old  MRN # [id number]   YOB: 1960    Chief Complaint: Concern for seizure, post ictal state, possible UTI  Reason for Consult: brittle type 2 diabetes - assist with inpatient management of glucose. Currently NPO, hoping to start TF on 12/12  Consulting Provider: Santiago Persaud       Isabell Matthews is a 61 year old female, with h/o  seizure disorder, type 2 diabetes treated as type 1, CKD3, HTN, HLD, CAD s/p PCI, hypothyroidism, vascular dementia, multiple prior ischemic strokes and last  acute ischemic stroke of right frontoparietal punctate stroke due to small-vessel occlusion (was hospitalized4/5/22), presenting with possible seizure, imaging negative for stroke, post ictal state and encephalopathy.    HPI:    Had rapid called early this morning with BG 15, pt unresponsive and received D50.  BG came up rapidly and patient  More responsive.  Remains on full liquid diet mildly thick.  Lantus and sliding scale increased.  No 2 am BG done as ordered.      Other Active Medical Problems: Possible UTI, encephalopathy,   Diabetes Mellitus Type: Insulin dependent Type 2 diabetes  Prior to Admission Diabetes Regimen:   Lantus 20 units at night and and does not follow sliding short acting scale in computer: for 201-320- give 2 units, very worried about her getting low       Diet: prior to admission she had her teeth pulled and so was eating less, softer foods- was eating mac and cheese  Usual BG control PTA:  \"brittle Diabetic\"  Can go from 170 to 40 in 1 hour, runs  in am and sometimes after meals runs 200-300- she does not follow  with A1c 10.3 on  10/31/2022  History of DKA:  Yes, last on 5/2022 admitted in DKA  Able to Detect Hypoglycemia: impaired due to dementia/dysphagia per her daughter  Usual Diabetes Care Provider: Dr Castaneda      10 point ROS completed with pertinent positives and negatives noted in the HPI--- mostly cannot tell me " "answers as she isn't speaking    Physical Exam   BP (!) 148/82   Pulse 78   Temp 97.4  F (36.3  C) (Oral)   Resp 16   Ht 1.651 m (5' 5\")   Wt 56.7 kg (125 lb)   SpO2 98%   BMI 20.80 kg/m    General:  Follows commands, nodded head, but went back to sleep  HEENT: normocephalic, atraumatic. Oral mucous membranes dry  Lungs: unlabored respiration, no cough  ABD: rounded, non distended, soft nontender  Skin: warm and dry, no obvious lesions on exposed skin  MSK:  moves all extremities  Lymp:  some LE edema   Mental status:  follow commands, squeezes hand and wiggles toes  Psych:   calm but sleepy     Most Recent Laboratory Tests:  Recent Labs   Lab 12/13/22  1001   HGB 10.6*     No results for input(s): A1C in the last 168 hours.  Recent Labs   Lab 12/13/22  1001   CR 1.24*     Recent Labs   Lab 12/14/22  0627 12/14/22  0528 12/14/22  0507 12/14/22  0446 12/14/22  0437 12/14/22  0435   * 147* 170* 263* 15* 16*       ROUTINE IP LABS (Last four results)  BMP  Recent Labs   Lab 12/14/22  0627 12/14/22  0528 12/14/22  0507 12/14/22  0446 12/13/22  1729 12/13/22  1728 12/13/22  1223 12/13/22  1001 12/12/22  1731 12/12/22  1611 12/12/22  1033 12/12/22  0809 12/11/22  0824 12/11/22  0720 12/10/22  1912 12/10/22  1138 12/10/22  1133   NA  --   --   --   --   --   --   --  140  --   --   --  143  --  139  --  141 142   POTASSIUM  --   --   --   --   --  4.0  --  3.2*  --  3.4  --  3.3*  --  3.7  --  4.2 4.4   CHLORIDE  --   --   --   --   --   --   --  110*  --   --   --  112*  --  106  --   --  104   VERONICA  --   --   --   --   --   --   --  8.2*  --   --   --  8.2*  --  8.5*  --   --  9.7   CO2  --   --   --   --   --   --   --  22  --   --   --  18*  --  19*  --   --  24   BUN  --   --   --   --   --   --   --  11.5  --   --   --  21.7  --  24.0*  --   --  26.5*   CR  --   --   --   --   --   --   --  1.24*  --   --   --  1.39*  --  1.40*  --   --  1.18*  1.3*   * 147* 170* 263*   < >  --    < > 179*   < >  " --    < > 147*   < > 325*   < > 147* 152*    < > = values in this interval not displayed.     CBC  Recent Labs   Lab 12/13/22  1001 12/11/22  0720 12/10/22  1138 12/10/22  1133   WBC 4.4 6.9  --  8.0   RBC 3.65* 3.75*  --  4.48   HGB 10.6* 10.7* 13.3 13.0   HCT 32.9* 33.8* 39 39.8   MCV 90 90  --  89   MCH 29.0 28.5  --  29.0   MCHC 32.2 31.7  --  32.7   RDW 13.6 13.7  --  13.3    162  --  185     INR  Recent Labs   Lab 12/10/22  1133 12/10/22  1132   INR 1.00 1.1*                 Reviewed BG:                         Ms. Matthews is a 62 year old woman with history of type 2 diabetes that is insulin dependent, recurrent UTI, CKD, vascular dementia, and CVA who presented as a stroke code on 12/10 after daughter found her at home unresponsive with ?seizure-like activity.  CTH, CTA, and hyperacute MRI were negative for acute stroke or LVO. Patient was subsequently admitted to general neurology for suspected post-ictal state/encephlopathy.        Assessment:   1.  Type 2 insulin dependent, sub optimally controlled, HgbA1c=10.3 -- so average VB=439  2. EVON, creat=1.39  3. Anemia  4. Stress induced hyperglycemia    Plan    - Decrease Novolog for carb intake 1 unit per 18 grams   - Decrease NPH 16 units every evening  -Decrease Medium intensity sliding scale since her BG are volatile to start at >200   -No bedtime sliding scale as gets low during the early am  -Change BG checks to before meals, bedtime and at 2 am hypoglycemia protocol      Endocrinology Outpatient follow up:     Please Schedule  your outpatient diabetes appointment 1-2 weeks from discharge with Dr Castaneda.     Please call your provider or can call outpatient [facility] endocrine  clinic at [phone]  if you have non-urgent questions regarding your blood sugars or insulin.     If you have urgent questions or concerns regarding your blood sugars or insulin, you may contact [phone] (the main hospital ). Ask to speak with the endocrinologist  on call.    Your target A1c value is less than 8%. Your most recent A1c is 10.3.     Thank you for letting the Diabetes Management Team be involved in your care!      Discussed plan of care with patient in person, nursing, and primary team per this note  Thank you for this consult; Inpatient Diabetes will continue to follow.     To contact Endocrine Diabetes service:   From 8AM-4PM: page inpatient diabetes provider that is following the patient  For questions or updates from 4PM-8AM: page the diabetes job code for on call fellow: [phone]    Review of prior external note(s) from - [facility] or Care Everywhere   35 minutes spent on the date of the encounter doing chart review, history and exam, documentation and further activities per the note     Over 50% of my time on the unit was spent counseling the patient and/or coordinating care regarding acute hyperglycemia management.  See note for details.         Marisela Dominguez PA-C  Inpatient Diabetes Management Service  Pager [phone]  12/14/2022

## 2022-12-14 NOTE — PROGRESS NOTES
"INPATIENT DIABETES Daily Note  Isabell Matthews  Age: 62 year old  MRN # [id number]   YOB: 1960    Chief Complaint: Concern for seizure, post ictal state, possible UTI  Reason for Consult: brittle type 2 diabetes - assist with inpatient management of glucose. Currently NPO, hoping to start TF on 12/12  Consulting Provider: Santiago Persaud       Isabell Matthews is a 61 year old female, with h/o  seizure disorder, type 2 diabetes treated as type 1, CKD3, HTN, HLD, CAD s/p PCI, hypothyroidism, vascular dementia, multiple prior ischemic strokes and last  acute ischemic stroke of right frontoparietal punctate stroke due to small-vessel occlusion (was hospitalized4/5/22), presenting with possible seizure, imaging negative for stroke, post ictal state and encephalopathy.    HPI:  Today is much more alert.  She spoke one word to say yes when I asked her if she was hungry.  She does not have nausea or emesis.  She follows commands slowly.  Remains on full liquid diet mildly thick.      Other Active Medical Problems: Possible UTI, encephalopathy,   Diabetes Mellitus Type: Insulin dependent Type 2 diabetes  Prior to Admission Diabetes Regimen:   Lantus 20 units at night and and does not follow sliding short acting scale in computer: for 201-320- give 2 units, very worried about her getting low       Diet: prior to admission she had her teeth pulled and so was eating less, softer foods- was eating mac and cheese  Usual BG control PTA:  \"brittle Diabetic\"  Can go from 170 to 40 in 1 hour, runs  in am and sometimes after meals runs 200-300- she does not follow  with A1c 10.3 on  10/31/2022  History of DKA:  Yes, last on 5/2022 admitted in DKA  Able to Detect Hypoglycemia: impaired due to dementia/dysphagia per her daughter  Usual Diabetes Care Provider: Dr Castaneda      10 point ROS completed with pertinent positives and negatives noted in the HPI--- mostly cannot tell me answers as she isn't speaking    Physical " "Exam   BP (!) 157/70 (BP Location: Left arm)   Pulse 85   Temp 96.9  F (36.1  C) (Oral)   Resp 18   Ht 1.651 m (5' 5\")   Wt 56.7 kg (125 lb)   SpO2 97%   BMI 20.80 kg/m    General: opens eyes to voice and looks at you, EEG leads removed this am, smiles, she nodded her head once and ansered yes  HEENT: normocephalic, atraumatic. Oral mucous membranes dry  Lungs: unlabored respiration, no cough  ABD: rounded, non distended, soft nontender  Skin: warm and dry, no obvious lesions on exposed skin  MSK:  moves all extremities  Lymp:  some LE edema   Mental status:  follow commands, squeezes hand and wiggles toes  Psych:   calm     Most Recent Laboratory Tests:  Recent Labs   Lab 12/13/22  1001   HGB 10.6*     No results for input(s): A1C in the last 168 hours.  Recent Labs   Lab 12/13/22  1001   CR 1.24*     Recent Labs   Lab 12/13/22  2148 12/13/22  1729 12/13/22  1223 12/13/22  1001 12/13/22  0850 12/13/22  0213   * 272* 257* 179* 125* 217*       ROUTINE IP LABS (Last four results)  BMP  Recent Labs   Lab 12/13/22  2148 12/13/22  1729 12/13/22  1728 12/13/22  1223 12/13/22  1001 12/12/22  1731 12/12/22  1611 12/12/22  1033 12/12/22  0809 12/11/22  0824 12/11/22  0720 12/10/22  1912 12/10/22  1138 12/10/22  1133   NA  --   --   --   --  140  --   --   --  143  --  139  --  141 142   POTASSIUM  --   --  4.0  --  3.2*  --  3.4  --  3.3*  --  3.7  --  4.2 4.4   CHLORIDE  --   --   --   --  110*  --   --   --  112*  --  106  --   --  104   VERONICA  --   --   --   --  8.2*  --   --   --  8.2*  --  8.5*  --   --  9.7   CO2  --   --   --   --  22  --   --   --  18*  --  19*  --   --  24   BUN  --   --   --   --  11.5  --   --   --  21.7  --  24.0*  --   --  26.5*   CR  --   --   --   --  1.24*  --   --   --  1.39*  --  1.40*  --   --  1.18*  1.3*   * 272*  --  257* 179*   < >  --    < > 147*   < > 325*   < > 147* 152*    < > = values in this interval not displayed.     CBC  Recent Labs   Lab 12/13/22  1001 " 12/11/22  0720 12/10/22  1138 12/10/22  1133   WBC 4.4 6.9  --  8.0   RBC 3.65* 3.75*  --  4.48   HGB 10.6* 10.7* 13.3 13.0   HCT 32.9* 33.8* 39 39.8   MCV 90 90  --  89   MCH 29.0 28.5  --  29.0   MCHC 32.2 31.7  --  32.7   RDW 13.6 13.7  --  13.3    162  --  185     INR  Recent Labs   Lab 12/10/22  1133 12/10/22  1132   INR 1.00 1.1*                 Reviewed BG:  BG dropped from 217 last night to 125 this am, fasting  Then got   12 g of carb and they checked 50 minutes islcn=[id number] so post prandial,  Overall with carb coverage and slidng scale Bg have come down  With decreasing carb coverage to 1:10                          Ms. Matthews is a 62 year old woman with history of type 2 diabetes that is insulin dependent, recurrent UTI, CKD, vascular dementia, and CVA who presented as a stroke code on 12/10 after daughter found her at home unresponsive with ?seizure-like activity.  CTH, CTA, and hyperacute MRI were negative for acute stroke or LVO. Patient was subsequently admitted to general neurology for suspected post-ictal state/encephlopathy.        Assessment:   1.  Type 2 insulin dependent, sub optimally controlled, HgbA1c=10.3 -- so average MJ=069  2. EVON, creat=1.39  3. Anemia  4. Stress induced hyperglycemia    Plan    - Change Novolog for carb intake 1 unit per 10 grams, will stop this on discharge   - Increase NPH 24 units every evening  -Continue Medium intensity sliding scale since her BG are volatile.See Home sliding scale below for discharge  -Change BG checks to before meals, bedtime and at 2 am hypoglycemia protocol    IP Diabetes Management Team Discharge Instructions    Glucose Control Regimen:     Lantus 24 units subcutaneous in evening    Sliding Scale Novolog Insulin to be given based on BG taken before meals only, not at bedtime:    Blood glucose 175- 225-->  Give 2 units of Novolog  Blood Glucose 226-275--> Give 4 units of Novolog  Blood glucose 276 and  >  Give 6 units of  Novolog          Blood Glucose Checks: three times daily before meals, and at bedtime.    Endocrinology Outpatient follow up:     Please Schedule  your outpatient diabetes appointment 1-2 weeks from discharge with Dr Castaneda.     Please call your provider or can call outpatient [facility] endocrine  clinic at [phone]  if you have non-urgent questions regarding your blood sugars or insulin.     If you have urgent questions or concerns regarding your blood sugars or insulin, you may contact [phone] (the main hospital ). Ask to speak with the endocrinologist on call.    Your target A1c value is less than 8%. Your most recent A1c is 10.3.     Thank you for letting the Diabetes Management Team be involved in your care!      Discussed plan of care with patient in person, nursing, and primary team per this note  Thank you for this consult; Inpatient Diabetes will continue to follow.     To contact Endocrine Diabetes service:   From 8AM-4PM: page inpatient diabetes provider that is following the patient  For questions or updates from 4PM-8AM: page the diabetes job code for on call fellow: [phone]    Review of prior external note(s) from - [facility] or Care Everywhere   35 minutes spent on the date of the encounter doing chart review, history and exam, documentation and further activities per the note     Over 50% of my time on the unit was spent counseling the patient and/or coordinating care regarding acute hyperglycemia management.  See note for details.         Marisela Dominguez PA-C  Inpatient Diabetes Management Service  Pager [phone]  12/13/2022

## 2022-12-14 NOTE — PROGRESS NOTES
I called patient's daughter, Vishal, to inform her we would plan on discharging in the morning. Left voicemail with these details and instructions to call the hospital if she had questions.     Darrick Root MD

## 2022-12-14 NOTE — CODE/RAPID RESPONSE
Rapid Response Team Note    Assessment   A rapid response was called on Isabell Matthews due to hypoglycemia . Blood sugar noted to be 16    Hypoglycemia protocol followed, dextrose given and repeat blood sugar was 263 within 10 minutes    Primary team at bedside, remainder of cares per primary team    PEGGY Eid CNP  [facility] RRT [facility] Job Code Contact #[phone]  [facility] Paging/Directory      Admission Diagnosis:   Altered mental status, unspecified altered mental status type [R41.82]  Intractable epilepsy with status epilepticus, unspecified epilepsy type (H) [G40.911]    Physical Exam   Temp: 97.4  F (36.3  C) Temp  Min: 95.7  F (35.4  C)  Max: 97.9  F (36.6  C)  Resp: 16 Resp  Min: 14  Max: 18  SpO2: 98 % SpO2  Min: 94 %  Max: 99 %  Pulse: 78 Pulse  Min: 70  Max: 85    No data recorded  BP: (!) 148/82 Systolic (24hrs), Av , Min:130 , Max:192   Diastolic (24hrs), Av, Min:66, Max:106     I/Os: I/O last 3 completed shifts:  In: 1400 [P.O.:200; I.V.:200; IV Piggyback:1000]  Out: -          Significant Results and Procedures   Lactic Acid:   Recent Labs   Lab Test 22  0749 22  0720 12/10/22  1501   LACT 2.0 2.3* 1.3     CBC:   Recent Labs   Lab Test 22  1001 22  0720 12/10/22  1138 12/10/22  1133   WBC 4.4 6.9  --  8.0   HGB 10.6* 10.7* 13.3 13.0   HCT 32.9* 33.8* 39 39.8    162  --  185

## 2022-12-14 NOTE — CODE/RAPID RESPONSE
Entered pt room at approximately 0430 and found pt with eyes open but unresponsive.  Vitals and BG taken.  Vital stable on room air except /106.  BG was 16, recheck was 15.  Rapid response called by another staff member.  1 amp of D50 given.  MD Centeno paged and assessed patient at bedside.  BG 10 min after D50 given was 263.  At this time, pt alert, able to state name in a whispered voice and follow commands.  Pts gown and sheets by head were wet, concern for possible seizure.  Incontinent of urine.    0500 vitals stable on room air.  .  Pt back to baseline.  Oriented to self, hospital, and month.  Voice stronger than previously.  Was able to walk to bathroom with assist of 1 and GB.  IV Zofran was given for nausea.  Lantus now placed on hold by MD.      Will continue to monitor.

## 2022-12-14 NOTE — PLAN OF CARE
A&O to self and time this evening. Follows commands, slow to respond. Speech quiet and garbled. 4/5 strength throughout.  All other neuros intact. On room air, VSS, afebrile. Adequate urine output, BM x1 this evening. SBA with gait belt and IV pole, ambulated in aldana x1 tonight. Full liquid deit with level 2 thick liquids. Holds food/drinks in mouth and needs frequent reminders to swallow. Discharge home with daughter tomorrow.

## 2022-12-14 NOTE — SIGNIFICANT EVENT
Significant Event Note    Time of event: 4:36 AM December 14, 2022    Description of event:    Was called to bedside by RN to evaluate pt following blood glucose of 15. Blood glucose was checked and found to be 16.  Pt was reportedly unresponsive during this time.  RRT was called.  Pt was given D50 50 mL.  After receiving D50, pt slowly became more alert.  After several minutes pt was oriented to self and following commands.  Cranial nerves intact and pt was antigravity in all extremities.  Recheck of blood glucose was 256.      Of note, insulin was regimen was changed yesterday. Lantus 24 units was given at 1745 yesterday afternoon.    The upper portion of pt's gown was wet and soiled.  Pt is on full liquid diet and it appears that she may have vomited.  No obvious signs of aspiration.    It is possible that patient had a provoked seizure in the setting of severe hypoglycemia.    Pt now appears near to reported baseline per notes and bedside nurse.  Will continue to closely monitor exam and blood glucose levels.      Plan:  - Continue to closely monitor blood glucose  - Discontinuing Lantus  - Readdress insulin regimen with Endocrinology in AM    Discussed with: supervising physician, Dr. Dk Mata MD  Neurology Resident, PGY3

## 2022-12-14 NOTE — PLAN OF CARE
VSS except HTN within MD parameters after rechecks.  Denied pain.  Alert, oriented to self and intermittent place, has slow garbled speech, intermittent n/t to bilat hands, and GW.  Pt impulsive when needing bathroom.  Following all commands. R double lumen midline SL.   On a full liquid mildly thick (level 2) diet with NO straws and requires supervision for pocketing.  Critical low BG level this shift; see previous note from this writer for detains.  Voiding with intermittent incontinence.  Last BM on 12/13.  Up with 1 and a GB.  Likely discharge home with daughter today.  Continue with POC.           Goal Outcome Evaluation:      Plan of Care Reviewed With: patient    Overall Patient Progress: improving

## 2022-12-15 ENCOUNTER — PATIENT OUTREACH (OUTPATIENT)
Dept: CARE COORDINATION | Facility: CLINIC | Age: 62
End: 2022-12-15

## 2022-12-15 LAB
BACTERIA BLD CULT: NO GROWTH
BACTERIA BLD CULT: NO GROWTH

## 2022-12-15 NOTE — PROGRESS NOTES
Clinic Care Coordination Contact  [facility]: Post-Discharge Note  SITUATION                                                      Admission:    Admission Date: 12/10/22   Reason for Admission: -Seizure & post ictal state   - Encephalopathy   - UTI  - CKD I  - Diabetes type 2  - Anemia  - Dementia  Discharge:   Discharge Date: 12/14/22  Discharge Diagnosis: - Dementia  -Intractable epilepsy with status epilepticus   -post-ictal state, resolved  -Ischemic vascular dementia  -UTI E.coli   -CKD  -HLD  -CAD  -Hypothyroidism  -Brittle Type 2 diabetes   -Hypoglycemia  -Acid reflux  -HTN  -Constipation    BACKGROUND                                                      Per hospital discharge summary and inpatient provider notes:    Brief History of Illness:   Ms. Matthews is a 62 year old woman with history of epilepsy, type 2 diabetes, recurrent UTI, CKD, vascular dementia, and CVA who presented as a stroke code on 12/10 after daughter found her at home unresponsive with seizure-like activity. Neuroimaging was done (CTH, CTA, and hyperacute MRI) which were negative for acute stroke or LVO. Patient was subsequently admitted to general neurology for suspected post-ictal state.     Hospital Course:  Ms. Matthews is a 62 year old woman with history of epilepsy, type 2 diabetes, recurrent UTI, CKD, vascular dementia, and CVA who presented as a stroke code on 12/10 after daughter found her at home unresponsive with seizure-like activity. Neuroimaging was done (CTH, CTA, and hyperacute MRI) which were negative for acute stroke or LVO. Patient was subsequently admitted to general neurology for suspected post-ictal state.     Patient is on baseline levetiracetam 500 mg twice daily, and a level was obtained in the ED that was normal.  There is no concern for missed doses because the patient's daughter administers all her medications.  The patient was given 2 g of IV levetiracetam as well as a total of 3 mg of IV Ativan in the  emergency department.  This seemed to improve her left gaze preference, however the patient has remained somnolent and somewhat rigid with left gaze preference (can be overcome) even through 12/11 AM. This improved as the afternoon went on. EEG read on 12/11 was negative for seizures or epileptiform discharges. There is generalized slowing between the two hemispheres. Unclear what provoked these potential seizures. vEEG discontinued 12/12 due to negative for seizures and epileptiform discharges. UA is mildly suspicious for UTI and urine culture positive for > 100,000 E. Coli colonies. Treating UTI wiith ceftriaxone (12/11-12/12) and was switched to cefpodoxime 200 mg BID for five days (12/13 until 12/17).     Patient did have a MRI cervical spine yesterday due to upper motor neuron findings on exam (bilateral babinski). Per imaging report there was multilevel cervical spondylosis, mild to moderate spinal canal stenosis at C3-C7 without cord compression. No myelopathic cord signal. These findings do not explain the UMN findings. It is unclear how long this has been going on. Likely due to sequela of previous cerebral infarcts.      #Encephalopathy, multifactorial (suspected UTI, benzodiazepine administration), improving  #Intractable epilepsy with status epilepticus (resolved)  #Suspected prolonged post-ictal state   #Ischemic vascular dementia   #History of ischemic stroke   #Upper motor neuron reflexes  - Discontinued vEEG monitoring  - S/P MRI of cervical spine  -Consider assessment for CO2 retention 2/2 to SURAJ contributing to encephalopathy, consider VBG ** CO2 on 12/11 was 34  - Capnography monitoring   - Thiamine level drawn (12/11) pending   - Thiamine  mg tablets  - Titrate Keppra from 500 mg BID to 750 mg PO BID      #UTI  E.coli   - Positive culture on 12/10 growing E. coli   - Treating UTI with cefpodoxine  mg BID for 5 days. Started 12/13 until 12/17             -had IV ceftriaxone for two  days prior (12/11-12/12)  - Blood cultures revealed no growth as of 12/12.   - Low suspicion to consult dental medicine if pt develops fevers/leukocytosis despite UTI treatment (query dental abscess with spread to bloodstream)   - COVID-19 negative  - mg tylenol Q4H for mild pain & fever   -PRN zofran 4 mg Q6H PO or IV for nausea     #EVON on CKD  -Discontinue maintenance NS at 50 ml/hr  -s/p1 L LR bolus 12/13  - Bladder management protocol      #HLD  #CAD   -Continue 81 mg ASA  -Continue PTA 40 mg atorvastatin daily      #Hypothyroidism  - TSH & T4 wnl, decreased T3 1.1  - Resumed on 12/12 75 mcg levothyroxine PO daily      #Hypokalemia (resolved)  - (most recent 12/14)  3.7 <- 4.0 <-3.2 <-3.4 <- 3.3 <- 3.7   - K+ replacement protocol completed 12/13  - Given 40 meq potassium chloride 12/12     #Hypomagnesemia (resolved)  - (most recent 12/14) 2.0 <- 1.5 <- 1.6 <- 1.7  - Mg2+ replacement protocol completed 12/13     #Brittle Type 2 Diabetes  #Hypoglycemia episode   Rapid response called overnight on 12/13 due to blood glucose of 15. Patient reportedly was unresponsive during this time. Received D50 and pt slowly become more alert. After 10 min glucose was 263. Head of bed and gown was wet and soiled, possible provoked seizure in setting of severe hypoglycemia.  - Medium dose insulin sliding scale   - PRN dextrose 50% IV 25-50 ml, 1 mg IV glucagon, & glucose gel for acute management of hypoglycemia   - Endocrinology increased lantus to 24 units- patient had rapid response due to critically low BG of 15.   - Continue with patients current outpatient regimen after discharge      #Elevated alkaline phosphatase   - RUQ ultrasound 12/11 negative        #Acid reflux   -Continue 10 mg famotidine BID              -continue for 2 weeks   -PRN Tums 500 mg PO daily      #HTN  Baseline on Coreg and doxazosin. Was intermittently quite hypertensive in the evening 12/10 going into 12/11. Required 50 mg IV labetalol and 5 mg  "IV hydralazine to control. BP much improved during day. Unclear why BP was so elevated. No signs/symptoms of pain. Resumed oral antihypertensives due to diet change to full liquids.   -Continue 12.5 mg carvedilol (coreg) BID   -Continue 1 mg doxazosin (cardura) at HS  -PRN 5 mg IV hydralazine Q6H & 10 mg labetalol IV [id number] for BP > 160/90  - Goal SBP < 180     #Chronic pain  - Continue 20 mg duloxetine daily      #Constipation  - Last BM 12/13  - PRN Miralax & senna daily for constipation    ASSESSMENT           Discharge Assessment  How are you doing now that you are home?: Daughter, Maria Guadalupe (patient gave verbal consent to speak with daughter), states her mom is doing \"ok\" today but yesterday \"5 minutes after we got home I was helping her take her jacket off while she was standing up, I turned around quick to set down some bags I was carrying and as I turned back around to finish helping my mom hang up her jacket, I saw the end of her faling over her wheelchair and hit her head on the on the laminant floor, splitting her forehead open.\"  States the open area on her forehead is \"just under an inch, split straight up and down,\" bled for a bit but stopped fairly quickly.  She cleaned the wound and put a bandage on it.  Removed the bandage this afternoon and still looks \"open, like it might need stitches but I can't tell.\"  Maria Guadalupe states her mom is still confused more than her normal baseline confusion, \"but not any worse than before she hit her head; she's taking longer to swallow and generally a little bit slower, acting like she does when she has a UTI, which she's being treated for.\"  Reports she thinks the fall was due to her mom losing her balance while standing up and reaching to hang her jacket up herself, she did not have a seizure before or after the fall.  States she has a hx of frequent falls \"but can usually catch herself before falling or ease herself to the floor.\"  RN advised daughter bring pt to ER or " UR for evaluation of head injury, especially with pt's recent hospitalization and severity of head injury with laceration.  Daughter agrees to bring pt to ER for evaluation tonight.  How are your symptoms? (Red Flag symptoms escalate to triage hotline per guidelines): Unchanged;Worsening  Do you feel your condition is stable enough to be safe at home until your provider visit?: No (see comment)  Does the patient have their discharge instructions? : Yes  Does the patient have questions regarding their discharge instructions? : No  Were you started on any new medications or were there changes to any of your previous medications? : Yes  Does the patient have all of their medications?: Yes  Do you have questions regarding any of your medications? : No  Do you have all of your needed medical supplies or equipment (DME)?  (i.e. oxygen tank, CPAP, cane, etc.): Yes  Discharge follow-up appointment scheduled within 14 calendar days? : Yes  Discharge Follow Up Appointment Date: 12/19/22  Discharge Follow Up Appointment Scheduled with?: Primary Care Provider         Post-op (Clinicians Only)  Did the patient have surgery or a procedure: No        PLAN                                                      Outpatient Plan:      Discharge Instructions  -Continue taking 750 mg Keppra BID orally and follow up  with your neurologist for further management.  -Follow up with your neurologist within the next 4-6 weeks  for follow up after hospitalization for breakthrough seizure  and check anti seizure levels at that time.  -Continue taking 100 mg of thiamine orally and follow up  with PCP for further management.  -Schedule an appointment with your endocrinologist at your  earliest convenience for management of type 2 DM and  follow up after hypoglycemic episode while hospitalized.  Follow up with primary care provider, Bony Castaneda, within 1-2 weeks, to evaluate medication change and for hospital follow- up. The following  labs/tests are recommended: repeat keppra level.  Follow up with Dr. Medina, at [facility] clinic, within 4-6 weeks to evaluate medication change, for hospital follow- up, and to follow up on results. The following labs/tests are recommended: repeat keppra level.    Future Appointments   Date Time Provider Department Center   12/19/2022  9:00 AM Bony Castaneda MD [facility]   5/1/2023  3:30 PM Bony Castaneda MD [facility]         For any urgent concerns, please contact our 24 hour nurse triage line: [phone] ([phone])         Deann Villalpando RN

## 2022-12-15 NOTE — PLAN OF CARE
Speech Language Therapy Discharge Summary    Reason for therapy discharge:    Discharged to home.    Progress towards therapy goal(s). See goals on Care Plan in [facility] electronic health record for goal details.  Goals met    Therapy recommendation(s):    Recommend pt advance to puree diet/thin liquids (pt's baseline) with 1:1 assistance. Ensure the pt is upright and alert for all PO, and ensure oral cavity is clear after PO intake.

## 2022-12-16 LAB — VIT B1 PYROPHOSHATE BLD-SCNC: 126 NMOL/L

## 2023-01-29 ENCOUNTER — HEALTH MAINTENANCE LETTER (OUTPATIENT)
Age: 63
End: 2023-01-29

## 2023-02-13 ENCOUNTER — TELEPHONE (OUTPATIENT)
Dept: FAMILY MEDICINE | Facility: CLINIC | Age: 63
End: 2023-02-13
Payer: MEDICARE

## 2023-02-13 ENCOUNTER — NURSE TRIAGE (OUTPATIENT)
Dept: NURSING | Facility: CLINIC | Age: 63
End: 2023-02-13
Payer: MEDICARE

## 2023-02-13 DIAGNOSIS — Z79.4 TYPE 2 DIABETES MELLITUS WITH DIABETIC POLYNEUROPATHY, WITH LONG-TERM CURRENT USE OF INSULIN (H): ICD-10-CM

## 2023-02-13 DIAGNOSIS — M79.7 FIBROMYALGIA: ICD-10-CM

## 2023-02-13 DIAGNOSIS — E10.59 TYPE 1 DIABETES MELLITUS WITH OTHER CIRCULATORY COMPLICATION (H): ICD-10-CM

## 2023-02-13 DIAGNOSIS — E11.42 TYPE 2 DIABETES MELLITUS WITH DIABETIC POLYNEUROPATHY, WITH LONG-TERM CURRENT USE OF INSULIN (H): Primary | ICD-10-CM

## 2023-02-13 DIAGNOSIS — Z79.4 TYPE 2 DIABETES MELLITUS WITH DIABETIC POLYNEUROPATHY, WITH LONG-TERM CURRENT USE OF INSULIN (H): Primary | ICD-10-CM

## 2023-02-13 DIAGNOSIS — E11.42 TYPE 2 DIABETES MELLITUS WITH DIABETIC POLYNEUROPATHY, WITH LONG-TERM CURRENT USE OF INSULIN (H): ICD-10-CM

## 2023-02-13 RX ORDER — INSULIN LISPRO 100 [IU]/ML
INJECTION, SOLUTION INTRAVENOUS; SUBCUTANEOUS
Qty: 15 ML | Refills: 1 | Status: SHIPPED | OUTPATIENT
Start: 2023-02-13 | End: 2023-03-15

## 2023-02-13 RX ORDER — INSULIN LISPRO 100 [IU]/ML
1-10 INJECTION, SOLUTION INTRAVENOUS; SUBCUTANEOUS
Qty: 9 ML | Refills: 0 | Status: SHIPPED | OUTPATIENT
Start: 2023-02-13 | End: 2023-02-14

## 2023-02-13 NOTE — TELEPHONE ENCOUNTER
Patient daughter is calling wanting the pen only filled, patients daughter would like a call back from someone on the care team as soon as possible to discuss

## 2023-02-13 NOTE — TELEPHONE ENCOUNTER
[facility] Call Center    Phone Message    May a detailed message be left on voicemail: yes     Reason for Call: Medication Refill Request    Has the patient contacted the pharmacy for the refill? Yes   Name of medication being requested:DULoxetine (CYMBALTA) 20 MG capsule     Fast acting insulin Pens  Provider who prescribed the medication: Dr. Castaneda  Pharmacy: [id number] - [place], [place] - [address] AT [facility] & [facility]  Date medication is needed: 2/13/2023    Per daughter patient needs the fast acting insulin pens sent today. Per daughter she was not sure if its Humalog or Novolog. Please call to discuss thank you.       Action Taken: Message routed to:  [facility]): pcc    Travel Screening: Not Applicable

## 2023-02-13 NOTE — TELEPHONE ENCOUNTER
RN called to let patient's daughter know the Humalog was refilled today to [facility] on [address], but no voicemail was available.    Dorina Argueta RN on 2/13/2023 at 1:56 PM

## 2023-02-14 NOTE — TELEPHONE ENCOUNTER
Reason for Disposition    [1] Prescription refill request for ESSENTIAL medicine (i.e., likelihood of harm to patient if not taken) AND [2] triager unable to refill per department policy    Additional Information    Negative: New-onset or worsening symptoms, see that guideline (e.g., diarrhea, runny nose, sore throat)    Negative: Medicine question not related to refill or renewal    Negative: Caller (e.g., patient or pharmacist) requesting information about a new medicine    Negative: Caller requesting information unrelated to medicine    Protocols used: MEDICATION REFILL AND RENEWAL CALL-A-  Nurse Triage SBAR    Is this a 2nd Level Triage? YES, LICENSED PRACTITIONER REVIEW IS REQUIRED Yes    Situation:   The daughter is calling and says she has called several times today    She says her mother has been out of insulin for two days for her sliding scale    Humalog cartridge was sent over and daughter says she can not use the cartridge, she needs the pen with the pen needles        Background:   She says her mother has been out of insulin for two days for her sliding scale        Assessment:   Need medication delivery change  Protocol Recommended Disposition:   Call PCP Now    Recommendation:   Continue with care advice, wait for providers response    Paged to provider Dr Kellogg    Does the patient meet one of the following criteria for ADS visit consideration? 16+ years old, with an [facility] PCP     TIP  Providers, please consider if this condition is appropriate for management at one of our Acute and Diagnostic Services sites.     If patient is a good candidate, please use dotphrase <dot>triageresponse and select Refer to ADS to document.

## 2023-02-14 NOTE — TELEPHONE ENCOUNTER
RN called [facility], and staff relayed that they will send a text message to patient, letting her know the insulin lispro (HUMALOG KWIKPEN) 100 UNIT/ML (1 unit dial) KWIKPEN is ready to be picked up.    Dorina Argueta RN on 2/14/2023 at 9:09 AM

## 2023-03-15 ENCOUNTER — OFFICE VISIT (OUTPATIENT)
Dept: FAMILY MEDICINE | Facility: CLINIC | Age: 63
End: 2023-03-15
Payer: MEDICARE

## 2023-03-15 VITALS
WEIGHT: 127.1 LBS | OXYGEN SATURATION: 96 % | BODY MASS INDEX: 21.15 KG/M2 | SYSTOLIC BLOOD PRESSURE: 131 MMHG | DIASTOLIC BLOOD PRESSURE: 83 MMHG | RESPIRATION RATE: 20 BRPM | HEART RATE: 87 BPM | TEMPERATURE: 97.9 F

## 2023-03-15 DIAGNOSIS — R15.1 FECAL SMEARING: ICD-10-CM

## 2023-03-15 DIAGNOSIS — E10.59 TYPE 1 DIABETES MELLITUS WITH OTHER CIRCULATORY COMPLICATION (H): ICD-10-CM

## 2023-03-15 DIAGNOSIS — K59.01 SLOW TRANSIT CONSTIPATION: ICD-10-CM

## 2023-03-15 DIAGNOSIS — E78.5 HYPERLIPIDEMIA LDL GOAL <70: ICD-10-CM

## 2023-03-15 DIAGNOSIS — L30.9 DERMATITIS: ICD-10-CM

## 2023-03-15 DIAGNOSIS — F01.50: ICD-10-CM

## 2023-03-15 DIAGNOSIS — N39.0 URINARY TRACT INFECTION WITHOUT HEMATURIA, SITE UNSPECIFIED: ICD-10-CM

## 2023-03-15 DIAGNOSIS — N18.32 STAGE 3B CHRONIC KIDNEY DISEASE (H): ICD-10-CM

## 2023-03-15 DIAGNOSIS — F01.53 VASCULAR DEMENTIA WITH DEPRESSED MOOD (H): Primary | ICD-10-CM

## 2023-03-15 DIAGNOSIS — R56.9 SEIZURES (H): ICD-10-CM

## 2023-03-15 DIAGNOSIS — E03.9 ACQUIRED HYPOTHYROIDISM: ICD-10-CM

## 2023-03-15 DIAGNOSIS — I10 BENIGN ESSENTIAL HYPERTENSION: ICD-10-CM

## 2023-03-15 DIAGNOSIS — E53.8 VITAMIN B12 DEFICIENCY (NON ANEMIC): ICD-10-CM

## 2023-03-15 DIAGNOSIS — Z86.39 HISTORY OF INSULIN DEPENDENT DIABETES MELLITUS: ICD-10-CM

## 2023-03-15 PROCEDURE — 99215 OFFICE O/P EST HI 40 MIN: CPT | Performed by: FAMILY MEDICINE

## 2023-03-15 RX ORDER — INSULIN LISPRO 100 [IU]/ML
INJECTION, SOLUTION INTRAVENOUS; SUBCUTANEOUS
Qty: 15 ML | Refills: 1 | Status: ON HOLD | OUTPATIENT
Start: 2023-03-15 | End: 2023-06-15

## 2023-03-15 RX ORDER — ACETAMINOPHEN 500 MG
500 TABLET ORAL EVERY 6 HOURS PRN
Status: ON HOLD | COMMUNITY
End: 2023-09-06

## 2023-03-15 RX ORDER — VITAMIN B COMPLEX
1 TABLET ORAL DAILY
Qty: 100 TABLET | Refills: 3 | Status: ON HOLD | OUTPATIENT
Start: 2023-03-15 | End: 2023-09-06

## 2023-03-15 RX ORDER — GABAPENTIN 100 MG/1
100 CAPSULE ORAL AT BEDTIME
Qty: 90 CAPSULE | Refills: 1 | Status: SHIPPED | OUTPATIENT
Start: 2023-03-15 | End: 2023-09-14

## 2023-03-15 RX ORDER — LANOLIN ALCOHOL/MO/W.PET/CERES
1000 CREAM (GRAM) TOPICAL DAILY
Qty: 100 TABLET | Refills: 3 | Status: SHIPPED | OUTPATIENT
Start: 2023-03-15 | End: 2023-09-14

## 2023-03-15 RX ORDER — INSULIN GLARGINE 100 [IU]/ML
INJECTION, SOLUTION SUBCUTANEOUS
Qty: 30 ML | Refills: 11 | Status: SHIPPED | OUTPATIENT
Start: 2023-03-15 | End: 2023-05-21

## 2023-03-15 RX ORDER — ZINC OXIDE 20 %
OINTMENT (GRAM) TOPICAL PRN
Qty: 85 G | Refills: 3 | Status: SHIPPED | OUTPATIENT
Start: 2023-03-15 | End: 2023-09-14

## 2023-03-15 RX ORDER — LEVETIRACETAM 500 MG/1
500 TABLET ORAL 2 TIMES DAILY
Qty: 180 TABLET | Refills: 3 | Status: SHIPPED | OUTPATIENT
Start: 2023-03-15 | End: 2023-05-21

## 2023-03-15 RX ORDER — NYSTATIN 100000 U/G
CREAM TOPICAL 2 TIMES DAILY
Qty: 30 G | Refills: 1 | Status: SHIPPED | OUTPATIENT
Start: 2023-03-15 | End: 2023-09-14

## 2023-03-15 NOTE — PROGRESS NOTES
Assessment & Plan     Vascular dementia with depressed mood  Cymbalta 20 mg daily, need to control BP and glucose    Type 1 diabetes mellitus with other circulatory complication (H)  I encouraged them to have labs today however they indicated they may need to go home and change in order to get reliable urine sample.  Today I updated her Lantus as she is taking 18 units and updated the short acting sliding scale as to what Maria Guadalupe is providing for her.  Encouraged ophthalmology visit.  Also added low-dose of gabapentin 100 mg at bedtime to assist with sleep and neuropathy.  - Hemoglobin A1c  - Comprehensive metabolic panel (BMP + Alb, Alk Phos, ALT, AST, Total. Bili, TP)  - CBC with platelets and differential  - insulin glargine (LANTUS SOLOSTAR) 100 UNIT/ML pen  Dispense: 30 mL; Refill: 11  - Adult Eye  Referral    - insulin lispro (HUMALOG KWIKPEN) 100 UNIT/ML (1 unit dial) KWIKPEN  Dispense: 15 mL; Refill: 1  - gabapentin (NEURONTIN) 100 MG capsule  Dispense: 90 capsule; Refill: 1    Seizures (H)  She has a history of a seizure disorder has not had recent visit with neurology.  They are providing Keppra 500 mg twice daily prior to the seizure in December she did not have a seizure for several years.  - Adult Neurology  Referral  - levETIRAcetam (KEPPRA) 500 MG tablet  Dispense: 180 tablet; Refill: 3    Stage 3b chronic kidney disease (H)  She is due for labs apparently they will return another day as labs were not completed today  - Comprehensive metabolic panel (BMP + Alb, Alk Phos, ALT, AST, Total. Bili, TP)  Slow transit constipation  Fecal smearing  I discussed sometimes with fecal smearing may need to cleanse the area well however with the pain she is having with a bowel movement could be that she is having some constipation and leakage therefore will add fiber on a daily basis to bulk up her stools.  I provided barrier cream Desitin  - zinc oxide (DESITIN) 20 % external ointment  Dispense:  85 g; Refill: 3  - methylcellulose (CITRUCEL) powder  Dispense: 454 g; Refill: 1    Urinary tract infection without hematuria, site unspecified  Previous urinary tract infection in December we will try to obtain urinalysis to ensure no signs of infection but she has had recent fecal smearing  - UA with Microscopic reflex to Culture - lab collect    Acquired hypothyroidism  Current dose is Synthroid 75 mcg daily due for labs  - TSH with free T4 reflex    Hyperlipidemia LDL goal <70  Due for fasting lipids  - Lipid panel reflex to direct LDL Fasting    History of insulin dependent diabetes mellitus  Requested insulin pen needles little bit longer than previous 4 mm  - insulin pen needle (31G X 8 MM) 31G X 8 MM miscellaneous  Dispense: 300 each; Refill: 4    Vitamin B12 deficiency (non anemic)  Renewal of vitamin B-12 take daily and vitamin D  - cyanocobalamin (VITAMIN B-12) 1000 MCG tablet  Dispense: 100 tablet; Refill: 3  - Vitamin D3 (CHOLECALCIFEROL) 25 mcg (1000 units) tablet  Dispense: 100 tablet; Refill: 3    Dermatitis  Refill provided apply to the groin area  - nystatin (MYCOSTATIN) 297411 UNIT/GM external cream  Dispense: 30 g; Refill: 1    57 minutes spent on the date of the encounter doing chart review, history, exam, diagnostics review, documentation, counseling and coordination of cares as noted.                 Return in about 14 weeks (around 6/21/2023).    Bony Castaneda MD  [facility] BERYL Whyte is a 62 year old, presenting for the following health issues:  Follow Up (Pt here for 6 month follow up; pt has been having shooting pain in abdomen on both sides; lessened sleep)      RAMILA Whyte is a 62 year old female with an extensive past medical history including hypertension, hyperlipidemia, diabetes mellitus insulin requiring, CKD stage 3b, coronary artery disease (s/p PCI 2004), hypothyroidism, degenerative arthritis, HX UTI, and prior history of  ischemic stroke & Vascular dementia, seizure disorder who presented to the ED for status epilepticus and encephalopathy 12/10/22 hospitalized through 12/14/22 sent home with her Daughter Adrienne personal caregiver. Lisa was in [place] with her other daughter for 3 weeks so Adrienne could have respite break.     Seizure  Lisa was admitted 12/10/2022 for seizure and encephalopathy likely also had urinary tract infection.  Prescription of Keppra was increased to 750 twice daily but they were only provided with 1 month supply therefore went back to the 500 mg twice daily.  She has not had follow-up yet with neurology will place referral.  They mention she had significant dental work day prior she is not able to remember if they had anesthesia for the dental procedure wondering if this contributed to the seizure however they feel likely she had a urinary tract infection.  She has been experiencing fecal incontinence or fecal smearing which may have contributed to a urinary tract infection.  On Thiamine 100 mg daily  She has been having difficulty sleeping and wondering if they can add any medication to help her sleep better.  Diabetes  Lisa was visiting her other daughter in [place], may have been off her diet.  They are uncertain of what doses she was provided of Lantus when she was in [place] but now that she is back in [place] has her taking 18 units of Lantus once daily in the evening and has provided short acting insulin but not following sliding scale.  She is worried about her glucose levels being too low.  Today we are updating her short acting insulin and Lantus doses to reflect what she is taking at home.  Hypertension  Cardura 1 mg at bedtime, carvedilol 12.5 mg twice daily  Mood and pain  She remains on Cymbalta 20 mg daily.  She has done better off of Lyrica is more alert.  Fecal smearing  Since return from visiting [place] they have noted significant fecal smearing she  has a low-grade rash needs renewal of nystatin and wondering also if she should be using a barrier cream.  Uncertain if she is having constipation does not seem to have hard bowel movements. Some shooting pains with BM.    Labs reviewed in EPIC  BP Readings from Last 3 Encounters:   03/15/23 131/83   12/14/22 (!) 147/62   10/31/22 132/84    Wt Readings from Last 3 Encounters:   03/15/23 57.7 kg (127 lb 1.6 oz)   12/11/22 56.7 kg (125 lb)   10/31/22 56 kg (123 lb 8 oz)                  Patient Active Problem List   Diagnosis     Benign essential hypertension     Acquired hypothyroidism     Seizures (H)     Hyperlipidemia LDL goal <100     Vitamin D deficiency     Vitamin B12 deficiency (non anemic)     Other osteoarthritis of spine, unspecified spinal region     DKA (diabetic ketoacidoses)     Nephrolithiasis     Left renal mass     Chest pressure     Coronary atherosclerosis     Dehydration     DM type 1 (diabetes mellitus, type 1) (H)     HTN (hypertension)     Nausea & vomiting     Otitis media of right ear     Ischemic vascular dementia (H)     Left-sided nontraumatic intracerebral hemorrhage, unspecified cerebral location (H)     Otitis media of right ear     Other osteoarthritis of spine, unspecified spinal region     Vitamin D deficiency     Vitamin B12 deficiency (non anemic)     Seizures (H)     Nausea & vomiting     Nail deformity     Mixed stress and urge urinary incontinence     Luetscher's syndrome     Left-sided nontraumatic intracerebral hemorrhage, unspecified cerebral location (H)     Vascular dementia without behavioral disturbance (H)     Fungal dermatitis     Type 1 diabetes mellitus with diabetic polyneuropathy (H)     Chest pressure     Essential hypertension     Dysphagia     Tobacco use     Dysthymia     History of diabetes with ketoacidosis     Stage 3b chronic kidney disease (H)     Type 2 diabetes mellitus with diabetic polyneuropathy, with long-term current use of insulin (H)     Full  incontinence of feces     Acute CVA (cerebrovascular accident) (H)     Altered mental status, unspecified altered mental status type     Hyperglycemia     Type 2 diabetes mellitus with other specified complication, with long-term current use of insulin (H)     Infection due to 2019 novel coronavirus     Hx: UTI (urinary tract infection)     Fibromyalgia     E. coli UTI     Intractable epilepsy with status epilepticus, unspecified epilepsy type (H)     Past Surgical History:   Procedure Laterality Date     athroplasty hip Bilateral     ,      OTHER SURGICAL HISTORY      athroplasty hip     STENT         Social History     Tobacco Use     Smoking status: Former     Packs/day: 0.50     Years: 35.00     Pack years: 17.50     Types: Cigarettes     Quit date: 2018     Years since quittin.7     Smokeless tobacco: Never   Substance Use Topics     Alcohol use: Not Currently     Family History   Problem Relation Age of Onset     Acute Myocardial Infarction Father      Heart Disease Maternal Grandmother      Dementia Maternal Grandmother      Myocardial Infarction Father      Dementia Paternal Grandmother      Heart Disease Paternal Grandmother          Current Outpatient Medications   Medication Sig Dispense Refill     atorvastatin (LIPITOR) 40 MG tablet Take 1 tablet (40 mg) by mouth daily 90 tablet 3     blood glucose (NO BRAND SPECIFIED) test strip Use to test blood sugar 4-5 times daily or as directed. 400 strip 3     carvedilol (COREG) 12.5 MG tablet Take 1 tablet (12.5 mg) by mouth 2 times daily (with meals) 180 tablet 3     cyanocobalamin (VITAMIN B-12) 1000 MCG tablet Take 1 tablet (1,000 mcg) by mouth daily 100 tablet 3     diclofenac (VOLTAREN) 1 % topical gel Apply 2 g topically 4 times daily as needed for moderate pain 150 g 1     doxazosin (CARDURA) 1 MG tablet Take 1 tablet (1 mg) by mouth At Bedtime 90 tablet 3     DULoxetine (CYMBALTA) 20 MG capsule Take 1 capsule (20 mg) by mouth daily 90  capsule 3     insulin glargine (LANTUS SOLOSTAR) 100 UNIT/ML pen INJECT 20 UNITS SUBCUTANEOUS AT BEDTIME 30 mL 11     insulin lispro (HUMALOG KWIKPEN) 100 UNIT/ML (1 unit dial) KWIKPEN INJ 4 UNITS WITH FOOD PLUS SLIDING SCALE. USE 24-48 UNITS PER DAY Strength: 100 UNIT/ML 15 mL 1     insulin pen needle (31G X 8 MM) 31G X 8 MM miscellaneous Use 4 pen needles daily or as directed. 300 each 4     levETIRAcetam (KEPPRA) 750 MG tablet 1 tablet (750 mg) by Oral or NG Tube route 2 times daily 60 tablet 0     loratadine (CLARITIN) 10 mg tablet [LORATADINE (CLARITIN) 10 MG TABLET] Take 10 mg by mouth daily.       melatonin 10 mg Tab Take 10 mg by mouth nightly as needed       SYNTHROID 75 MCG tablet Take 1 tablet (75 mcg) by mouth daily 90 tablet 3     Vitamin D3 (CHOLECALCIFEROL) 25 mcg (1000 units) tablet Take 1 tablet (25 mcg) by mouth daily 100 tablet 3     aspirin (ASA) 81 MG chewable tablet Take 1 tablet (81 mg) by mouth daily 30 tablet 0     cefpodoxime (VANTIN) 200 MG tablet 1 tablet (200 mg) by Oral or Feeding Tube route 2 times daily (Patient not taking: Reported on 3/15/2023) 6 tablet 0     chlorhexidine (PERIDEX) 0.12 % solution Swish and spit 30 mL once daily for 14 days. (Patient not taking: Reported on 3/15/2023)       famotidine (PEPCID) 10 MG tablet Take 1 tablet (10 mg) by mouth 2 times daily (Patient not taking: Reported on 3/15/2023) 24 tablet 0     thiamine (B-1) 100 MG tablet Take 1 tablet (100 mg) by mouth daily (Patient not taking: Reported on 3/15/2023) 30 tablet 0     Allergies   Allergen Reactions     Seasonal Allergies      Recent Labs   Lab Test 12/14/22  1026 12/13/22  1728 12/13/22  1001 12/12/22  1611 12/12/22  0809 12/11/22  0749 12/10/22  1138 12/10/22  1133 10/31/22  1721 07/26/22  1703 07/26/22  1703 05/03/22  0228 05/03/22  0227 04/06/22  0407 04/06/22  0014 04/05/22  2116 04/05/22  2115 04/05/22  2113 08/02/21  1009 07/07/21  0630 07/07/21  0630 06/27/21  0759   A1C  --   --   --   --    --   --   --   --  10.3*  --   --   --  8.9*  --   --   --  8.8*  --  8.2*   < >  --   --    LDL  --   --   --   --   --   --   --   --   --   --   --   --   --   --  39  --   --   --  75  --   --   --    HDL  --   --   --   --   --   --   --   --   --   --   --   --   --   --  34*  --   --   --  39*  --   --   --    TRIG  --   --   --   --   --   --   --   --   --   --   --   --   --   --  129  --   --   --  158*  --   --   --    ALT  --   --   --   --   --   --   --  21 28  --  25   < > 23   < >  --   --   --   --  17  --   --   --    CR  --   --  1.24*  --  1.39*  --    < > 1.18*  1.3* 1.44*  --  1.13*   < > 1.84*   < > 1.20*   < >  --    < > 1.59*   < > 1.68* 1.48*   GFRESTIMATED  --   --  48*  --  42*  --    < > 46*  51* 40*  --  54*   < > 30*   < > 51*   < >  --    < > 35*  --  31* 37*   GFRESTBLACK  --   --   --   --   --   --   --   --   --   --   --   --   --   --   --   --   --   --   --   --  37* 43*   POTASSIUM 3.7 4.0 3.2*   < > 3.3*  --    < > 4.4 4.6   < > 4.3   < > 5.5*   < >  --    < >  --   --  3.6   < > 3.9 3.5   TSH  --   --   --   --   --  0.55  --   --  0.06*  --  0.19*   < >  --   --   --   --   --   --  0.63  --   --   --     < > = values in this interval not displayed.      Review of Systems   Problem list, PM, Surgical HX, FH, SH, allergies, medications,immunizations reviewed and updated in Epic. 10 point ROS negative other than noted in HPI and ROS.        Objective    /83 (BP Location: Right arm, Patient Position: Sitting, Cuff Size: Adult Regular)   Pulse 87   Temp 97.9  F (36.6  C)   Resp 20   Wt 57.7 kg (127 lb 1.6 oz)   SpO2 96%   BMI 21.15 kg/m    Body mass index is 21.15 kg/m .  Physical Exam   Constitutional: Sitting in a wheelchair sleepy but awakens and indicates her wishes, She has her daughter review HX, chronic poor health, well-groomed, cooperative.  HENT: TM normal.  Right pinna pedunculated growth she indicates has been present for years without  change.  Head: Normocephalic and atraumatic.   Mouth/Throat: Wearing mask removed briefly hydrated no thrush she has dental decay and a loose tooth in the molar on the upper right.  Eyes: Conjunctivae and EOM are normal. Pupils are equal, round, and reactive to light. No scleral icterus.   Neck:  Neck supple.  No tracheal deviation present. No thyromegaly present.   Cardiovascular: Regular rhythm, normal heart sounds and intact distal pulses. No murmur present. Exam reveals no gallop and no friction rub.   Pulmonary/Chest: Effort normal and breath sounds normal. No respiratory distress.   Abdominal: Exam in chair soft obese. Bowel sounds are normal. No distension and no mass. No tenderness.   Musculoskeletal: Deconditioned upper and lower extremities.   No edema and no tenderness. No bruising no signs of trauma  Neurological: Sleepy, cooperative.  Signs of vascular dementia. Able to move bilateral upper and lower extremities   Skin: No ulcerations or bruising noted on exposed skin   Psychiatric: Cooperative, communicative through nods and a few words

## 2023-03-15 NOTE — NURSING NOTE
Isabell Matthews is a 62 year old female that presents in clinic today for the following:     Chief Complaint   Patient presents with     Follow Up     Pt here for 6 month follow up; pt has been having shooting pain in abdomen on both sides; lessened sleep       The patient's allergies and medications were reviewed. The patient's vitals were obtained without incident. The patient does not have any other questions for the provider.     Geovanna Mcpherson, EMT at 10:23 AM on 3/15/2023.  Primary Care Clinic: [phone]

## 2023-03-29 ENCOUNTER — LAB (OUTPATIENT)
Dept: LAB | Facility: CLINIC | Age: 63
End: 2023-03-29
Payer: MEDICARE

## 2023-03-29 DIAGNOSIS — E10.59 TYPE 1 DIABETES MELLITUS WITH OTHER CIRCULATORY COMPLICATION (H): ICD-10-CM

## 2023-03-29 DIAGNOSIS — E78.5 HYPERLIPIDEMIA LDL GOAL <70: ICD-10-CM

## 2023-03-29 DIAGNOSIS — N18.32 STAGE 3B CHRONIC KIDNEY DISEASE (H): ICD-10-CM

## 2023-03-29 DIAGNOSIS — E03.9 ACQUIRED HYPOTHYROIDISM: ICD-10-CM

## 2023-03-29 DIAGNOSIS — N39.0 URINARY TRACT INFECTION WITHOUT HEMATURIA, SITE UNSPECIFIED: ICD-10-CM

## 2023-03-29 DIAGNOSIS — R79.89 ELEVATED SERUM CREATININE: ICD-10-CM

## 2023-03-29 LAB
ALBUMIN SERPL BCG-MCNC: 4.1 G/DL (ref 3.5–5.2)
ALP SERPL-CCNC: 126 U/L (ref 35–104)
ALT SERPL W P-5'-P-CCNC: 39 U/L (ref 10–35)
ANION GAP SERPL CALCULATED.3IONS-SCNC: 8 MMOL/L (ref 7–15)
AST SERPL W P-5'-P-CCNC: 36 U/L (ref 10–35)
BASOPHILS # BLD AUTO: 0 10E3/UL (ref 0–0.2)
BASOPHILS NFR BLD AUTO: 1 %
BILIRUB SERPL-MCNC: 0.3 MG/DL
BUN SERPL-MCNC: 28.3 MG/DL (ref 8–23)
CALCIUM SERPL-MCNC: 9.7 MG/DL (ref 8.8–10.2)
CHLORIDE SERPL-SCNC: 105 MMOL/L (ref 98–107)
CHOLEST SERPL-MCNC: 141 MG/DL
CREAT SERPL-MCNC: 1.62 MG/DL (ref 0.51–0.95)
DEPRECATED HCO3 PLAS-SCNC: 28 MMOL/L (ref 22–29)
EOSINOPHIL # BLD AUTO: 0.1 10E3/UL (ref 0–0.7)
EOSINOPHIL NFR BLD AUTO: 2 %
ERYTHROCYTE [DISTWIDTH] IN BLOOD BY AUTOMATED COUNT: 13.2 % (ref 10–15)
GFR SERPL CREATININE-BSD FRML MDRD: 35 ML/MIN/1.73M2
GLUCOSE SERPL-MCNC: 79 MG/DL (ref 70–99)
HBA1C MFR BLD: 11.2 %
HCT VFR BLD AUTO: 40.3 % (ref 35–47)
HDLC SERPL-MCNC: 48 MG/DL
HGB BLD-MCNC: 12.9 G/DL (ref 11.7–15.7)
IMM GRANULOCYTES # BLD: 0 10E3/UL
IMM GRANULOCYTES NFR BLD: 0 %
LDLC SERPL CALC-MCNC: 72 MG/DL
LYMPHOCYTES # BLD AUTO: 1.6 10E3/UL (ref 0.8–5.3)
LYMPHOCYTES NFR BLD AUTO: 27 %
MCH RBC QN AUTO: 29.1 PG (ref 26.5–33)
MCHC RBC AUTO-ENTMCNC: 32 G/DL (ref 31.5–36.5)
MCV RBC AUTO: 91 FL (ref 78–100)
MONOCYTES # BLD AUTO: 0.3 10E3/UL (ref 0–1.3)
MONOCYTES NFR BLD AUTO: 5 %
NEUTROPHILS # BLD AUTO: 3.9 10E3/UL (ref 1.6–8.3)
NEUTROPHILS NFR BLD AUTO: 65 %
NONHDLC SERPL-MCNC: 93 MG/DL
NRBC # BLD AUTO: 0 10E3/UL
NRBC BLD AUTO-RTO: 0 /100
PLATELET # BLD AUTO: 201 10E3/UL (ref 150–450)
POTASSIUM SERPL-SCNC: 4.1 MMOL/L (ref 3.4–5.3)
PROT SERPL-MCNC: 6.9 G/DL (ref 6.4–8.3)
RBC # BLD AUTO: 4.44 10E6/UL (ref 3.8–5.2)
SODIUM SERPL-SCNC: 141 MMOL/L (ref 136–145)
TRIGL SERPL-MCNC: 105 MG/DL
TSH SERPL DL<=0.005 MIU/L-ACNC: 2.38 UIU/ML (ref 0.3–4.2)
WBC # BLD AUTO: 6 10E3/UL (ref 4–11)

## 2023-03-29 PROCEDURE — 83036 HEMOGLOBIN GLYCOSYLATED A1C: CPT | Performed by: FAMILY MEDICINE

## 2023-03-29 PROCEDURE — 80061 LIPID PANEL: CPT | Performed by: PATHOLOGY

## 2023-03-29 PROCEDURE — 80050 GENERAL HEALTH PANEL: CPT | Performed by: PATHOLOGY

## 2023-03-29 PROCEDURE — 36415 COLL VENOUS BLD VENIPUNCTURE: CPT | Performed by: PATHOLOGY

## 2023-03-29 NOTE — LETTER
April 5, 2023      Isabell Matthews  [address]  [place] [address]        Dear ,    We are writing to inform you of your test results.    Dear Isabell Matthews    We have tried to reach you several times via phone to review need for urinary tract infection treatment. Macrobid twice daily was sent to your pharmacy. They were called to text you with medication.   Please update phone numbers and empty your mail box as we are not able to leave messages.   Best wishes,   Bony Castaneda MD     Resulted Orders   Hemoglobin A1c   Result Value Ref Range    Hemoglobin A1C 11.2 (H) <5.7 %      Comment:      Normal <5.7%   Prediabetes 5.7-6.4%    Diabetes 6.5% or higher     Note: Adopted from ADA consensus guidelines.   TSH with free T4 reflex   Result Value Ref Range    TSH 2.38 0.30 - 4.20 uIU/mL   Comprehensive metabolic panel (BMP + Alb, Alk Phos, ALT, AST, Total. Bili, TP)   Result Value Ref Range    Sodium 141 136 - 145 mmol/L    Potassium 4.1 3.4 - 5.3 mmol/L    Chloride 105 98 - 107 mmol/L    Carbon Dioxide (CO2) 28 22 - 29 mmol/L    Anion Gap 8 7 - 15 mmol/L    Urea Nitrogen 28.3 (H) 8.0 - 23.0 mg/dL    Creatinine 1.62 (H) 0.51 - 0.95 mg/dL    Calcium 9.7 8.8 - 10.2 mg/dL    Glucose 79 70 - 99 mg/dL    Alkaline Phosphatase 126 (H) 35 - 104 U/L    AST 36 (H) 10 - 35 U/L    ALT 39 (H) 10 - 35 U/L    Protein Total 6.9 6.4 - 8.3 g/dL    Albumin 4.1 3.5 - 5.2 g/dL    Bilirubin Total 0.3 <=1.2 mg/dL    GFR Estimate 35 (L) >60 mL/min/1.73m2      Comment:      eGFR calculated using 2021 CKD-EPI equation.   Lipid panel reflex to direct LDL Fasting   Result Value Ref Range    Cholesterol 141 <200 mg/dL    Triglycerides 105 <150 mg/dL    Direct Measure HDL 48 (L) >=50 mg/dL    LDL Cholesterol Calculated 72 <=100 mg/dL    Non HDL Cholesterol 93 <130 mg/dL    Narrative    Cholesterol  Desirable:  <200 mg/dL    Triglycerides  Normal:  Less than 150 mg/dL  Borderline High:  150-199 mg/dL  High:  200-499 mg/dL  Very  High:  Greater than or equal to 500 mg/dL    Direct Measure HDL  Female:  Greater than or equal to 50 mg/dL   Male:  Greater than or equal to 40 mg/dL    LDL Cholesterol  Desirable:  <100mg/dL  Above Desirable:  100-129 mg/dL   Borderline High:  130-159 mg/dL   High:  160-189 mg/dL   Very High:  >= 190 mg/dL    Non HDL Cholesterol  Desirable:  130 mg/dL  Above Desirable:  130-159 mg/dL  Borderline High:  160-189 mg/dL  High:  190-219 mg/dL  Very High:  Greater than or equal to 220 mg/dL   CBC with platelets and differential   Result Value Ref Range    WBC Count 6.0 4.0 - 11.0 10e3/uL    RBC Count 4.44 3.80 - 5.20 10e6/uL    Hemoglobin 12.9 11.7 - 15.7 g/dL    Hematocrit 40.3 35.0 - 47.0 %    MCV 91 78 - 100 fL    MCH 29.1 26.5 - 33.0 pg    MCHC 32.0 31.5 - 36.5 g/dL    RDW 13.2 10.0 - 15.0 %    Platelet Count 201 150 - 450 10e3/uL    % Neutrophils 65 %    % Lymphocytes 27 %    % Monocytes 5 %    % Eosinophils 2 %    % Basophils 1 %    % Immature Granulocytes 0 %    NRBCs per 100 WBC 0 <1 /100    Absolute Neutrophils 3.9 1.6 - 8.3 10e3/uL    Absolute Lymphocytes 1.6 0.8 - 5.3 10e3/uL    Absolute Monocytes 0.3 0.0 - 1.3 10e3/uL    Absolute Eosinophils 0.1 0.0 - 0.7 10e3/uL    Absolute Basophils 0.0 0.0 - 0.2 10e3/uL    Absolute Immature Granulocytes 0.0 <=0.4 10e3/uL    Absolute NRBCs 0.0 10e3/uL   UA with Microscopic reflex to Culture   Result Value Ref Range    Color Urine Yellow Colorless, Straw, Light Yellow, Yellow    Appearance Urine Cloudy (A) Clear    Glucose Urine Negative Negative mg/dL    Bilirubin Urine Negative Negative    Ketones Urine Negative Negative mg/dL    Specific Gravity Urine 1.009 1.003 - 1.035    Blood Urine Small (A) Negative    pH Urine 5.0 5.0 - 7.0    Protein Albumin Urine Negative Negative mg/dL    Urobilinogen Urine Normal Normal, 2.0 mg/dL    Nitrite Urine Negative Negative    Leukocyte Esterase Urine Large (A) Negative    Bacteria Urine Many (A) None Seen /HPF    WBC Clumps Urine  Present (A) None Seen /HPF    Budding Yeast Urine Few (A) None Seen /HPF    Mucus Urine Present (A) None Seen /LPF    RBC Urine 21 (H) <=2 /HPF    WBC Urine >182 (H) <=5 /HPF    Squamous Epithelials Urine 3 (H) <=1 /HPF    Narrative    Urine Culture ordered based on laboratory criteria   Albumin Random Urine Quantitative with Creat Ratio   Result Value Ref Range    Creatinine Urine mg/dL 54.9 mg/dL      Comment:      The reference ranges have not been established in urine creatinine. The results should be integrated into the clinical context for interpretation.    Albumin Urine mg/L 79.4 mg/L      Comment:      The reference ranges have not been established in urine albumin. The results should be integrated into the clinical context for interpretation.    Albumin Urine mg/g Cr 144.63 (H) 0.00 - 25.00 mg/g Cr      Comment:      Microalbuminuria is defined as an albumin:creatinine ratio of 17 to 299 for males and 25 to 299 for females. A ratio of albumin:creatinine of 300 or higher is indicative of overt proteinuria.  Due to biologic variability, positive results should be confirmed by a second, first-morning random or 24-hour timed urine specimen. If there is discrepancy, a third specimen is recommended. When 2 out of 3 results are in the microalbuminuria range, this is evidence for incipient nephropathy and warrants increased efforts at glucose control, blood pressure control, and institution of therapy with an angiotensin-converting-enzyme (ACE) inhibitor (if the patient can tolerate it).     Urine Culture   Result Value Ref Range    Culture >100,000 CFU/mL Escherichia coli (A)        If you have any questions or concerns, please call the clinic at the number listed above.       Sincerely,      Bony Castaneda MD

## 2023-03-31 ENCOUNTER — TELEPHONE (OUTPATIENT)
Dept: NEPHROLOGY | Facility: CLINIC | Age: 63
End: 2023-03-31
Payer: MEDICARE

## 2023-03-31 ENCOUNTER — TELEPHONE (OUTPATIENT)
Dept: NEUROPSYCHOLOGY | Facility: CLINIC | Age: 63
End: 2023-03-31

## 2023-03-31 ENCOUNTER — MYC MEDICAL ADVICE (OUTPATIENT)
Dept: NEUROLOGY | Facility: CLINIC | Age: 63
End: 2023-03-31

## 2023-03-31 ENCOUNTER — TELEPHONE (OUTPATIENT)
Dept: FAMILY MEDICINE | Facility: CLINIC | Age: 63
End: 2023-03-31
Payer: MEDICARE

## 2023-03-31 DIAGNOSIS — N18.32 STAGE 3B CHRONIC KIDNEY DISEASE (H): ICD-10-CM

## 2023-03-31 DIAGNOSIS — R79.89 ELEVATED SERUM CREATININE: Primary | ICD-10-CM

## 2023-03-31 DIAGNOSIS — E10.59 TYPE 1 DIABETES MELLITUS WITH OTHER CIRCULATORY COMPLICATION (H): Primary | ICD-10-CM

## 2023-03-31 DIAGNOSIS — E55.9 VITAMIN D DEFICIENCY, UNSPECIFIED: ICD-10-CM

## 2023-03-31 PROBLEM — I61.9 LEFT-SIDED NONTRAUMATIC INTRACEREBRAL HEMORRHAGE, UNSPECIFIED CEREBRAL LOCATION (H): Status: RESOLVED | Noted: 2021-06-21 | Resolved: 2023-03-31

## 2023-03-31 LAB
ALBUMIN MFR UR ELPH: 17 MG/DL (ref 1–14)
ALBUMIN UR-MCNC: NEGATIVE MG/DL
APPEARANCE UR: ABNORMAL
BACTERIA #/AREA URNS HPF: ABNORMAL /HPF
BILIRUB UR QL STRIP: NEGATIVE
COLOR UR AUTO: YELLOW
CREAT UR-MCNC: 54.6 MG/DL
CREAT UR-MCNC: 54.9 MG/DL
GLUCOSE UR STRIP-MCNC: NEGATIVE MG/DL
HGB UR QL STRIP: ABNORMAL
KETONES UR STRIP-MCNC: NEGATIVE MG/DL
LEUKOCYTE ESTERASE UR QL STRIP: ABNORMAL
MICROALBUMIN UR-MCNC: 79.4 MG/L
MICROALBUMIN/CREAT UR: 144.63 MG/G CR (ref 0–25)
MUCOUS THREADS #/AREA URNS LPF: PRESENT /LPF
NITRATE UR QL: NEGATIVE
PH UR STRIP: 5 [PH] (ref 5–7)
PROT/CREAT 24H UR: 0.31 MG/MG CR (ref 0–0.2)
RBC URINE: 21 /HPF
SP GR UR STRIP: 1.01 (ref 1–1.03)
SQUAMOUS EPITHELIAL: 3 /HPF
UROBILINOGEN UR STRIP-MCNC: NORMAL MG/DL
WBC CLUMPS #/AREA URNS HPF: PRESENT /HPF
WBC URINE: >182 /HPF
YEAST #/AREA URNS HPF: ABNORMAL /HPF

## 2023-03-31 PROCEDURE — 82570 ASSAY OF URINE CREATININE: CPT | Performed by: FAMILY MEDICINE

## 2023-03-31 PROCEDURE — 84156 ASSAY OF PROTEIN URINE: CPT | Performed by: PATHOLOGY

## 2023-03-31 PROCEDURE — 81001 URINALYSIS AUTO W/SCOPE: CPT | Performed by: PATHOLOGY

## 2023-03-31 PROCEDURE — 87186 SC STD MICRODIL/AGAR DIL: CPT | Performed by: FAMILY MEDICINE

## 2023-03-31 NOTE — Clinical Note
Please call to schedule endocrine ASAP, Nephrology consult. She has follow-up scheduled with me in June 26 needs lab visit prior. Best wishes, Bony Castaneda MD

## 2023-03-31 NOTE — RESULT ENCOUNTER NOTE
Dear Isabell Matthews   Diabetes needs improved control A1C very high at 11.2 goal for you is less than 8.  THis may be reflective of the fact your were not following your usual diabetic diet when you recently travelled. Your glucose was on the lower side at 79 therefore I will recommend diabetes endocrine referral as soon as possible.   It is important to control you glucose well as your creatinine ( kidney function has worsened to stage 3 b renal impairment. You blood pressure was in control at the visit therefore make sure you are taking your blood pressure medications and optimization of glucose control is recommended. Make sure you are hydrating well with water 6 -8  8 ounce fluids daily most of this water.  Your cholesterol and thyroid are in range on current medications please continue.  3 month follow-up with me is recommended.  Best wishes,  Bony Castaneda MD

## 2023-03-31 NOTE — PROGRESS NOTES
Dear Isabell Matthews   Diabetes needs improved control A1C very high at 11.2 goal for you is less than 8.  THis may be reflective of the fact your were not following your usual diabetic diet when you recently travelled. Your glucose was on the lower side at 79 therefore I will recommend diabetes endocrine referral as soon as possible.   It is important to control you glucose well as your creatinine ( kidney function has worsened to stage 3 b renal impairment. You blood pressure was in control at the visit therefore make sure you are taking your blood pressure medications and optimization of glucose control is recommended. Make sure you are hydrating well with water 6 -8  8 ounce fluids daily most of this water.  Your cholesterol and thyroid are in range on current medications please continue.  3 month follow-up with me is recommended.  Bony Neff MD     I also recommended nephrology consult.  Bony Castaneda MD  From: Bony Castaneda MD On: 03/31/2023 10:25 AM   To: [facility] (Pool)   Priority: High   Routing Comments:   Please call to schedule endocrine ASAP, Nephrology consult.   She has follow-up scheduled with me in June 26 needs lab visit prior.   Bony Neff MD      RN placed a call to patient, but no voicemail was available.  [facility] and asked for any other number they might have on file, or if patient is receiving text alerts to notify patient that Rx was sent by Dr. Castaneda.  [facility] staff relayed that patient has opted in for text alerts, and that the patient has replied to these in the past, so patient will receive a text alert today that Rx is ready to be picked up.     Dorina Argueta RN on 4/3/2023 at 9:09 AM

## 2023-03-31 NOTE — TELEPHONE ENCOUNTER
[facility] Call Center    Phone Message    May a detailed message be left on voicemail: yes     Reason for Call: Order(s): Other:   Reason for requested: LABS  Date needed: 5/12/2023  Provider name: Di    Action Taken: Message routed to:  Other: Neph    Travel Screening: Not Applicable

## 2023-03-31 NOTE — TELEPHONE ENCOUNTER
Patient's daughter called earlier today to schedule. Writer messaged team to see if patient could be fit in before referral expires. Per team currently no openings, will need to request extension from referring provider. Called patient's daughter to discuss this. Unable to LVM. Sent Racquel Brian on 3/31/2023 at 12:40 PM

## 2023-04-02 LAB — BACTERIA UR CULT: ABNORMAL

## 2023-04-03 ENCOUNTER — TELEPHONE (OUTPATIENT)
Dept: FAMILY MEDICINE | Facility: CLINIC | Age: 63
End: 2023-04-03
Payer: MEDICARE

## 2023-04-03 DIAGNOSIS — B96.20 E. COLI UTI: Primary | ICD-10-CM

## 2023-04-03 DIAGNOSIS — N39.0 E. COLI UTI: Primary | ICD-10-CM

## 2023-04-03 RX ORDER — NITROFURANTOIN 25; 75 MG/1; MG/1
100 CAPSULE ORAL 2 TIMES DAILY
Qty: 14 CAPSULE | Refills: 0 | Status: SHIPPED | OUTPATIENT
Start: 2023-04-03 | End: 2023-04-10

## 2023-04-03 NOTE — TELEPHONE ENCOUNTER
Culture >100,000 CFU/mL Escherichia coli Abnormal             Resulting Agency: [facility]     Susceptibility     Escherichia coli     LARRY     Ampicillin >=32 ug/mL Resistant     Ampicillin/ Sulbactam 4 ug/mL Susceptible     Cefazolin <=4 ug/mL Susceptible 1     Cefepime <=1 ug/mL Susceptible     Cefoxitin 8 ug/mL Susceptible     Ceftazidime <=1 ug/mL Susceptible     Ceftriaxone <=1 ug/mL Susceptible     Ciprofloxacin 1 ug/mL Resistant     Gentamicin <=1 ug/mL Susceptible     Levofloxacin 4 ug/mL Resistant     Nitrofurantoin <=16 ug/mL Susceptible     Piperacillin/Tazobactam <=4 ug/mL Susceptible     Tobramycin <=1 ug/mL Susceptible     Trimethoprim/Sulfamethoxazole >16/304 ug/mL Resistant              1 Cefazolin LARRY breakpoints are for the treatment of uncomplicated urinary tract infections. For the treatment of systemic infections, please contact the laboratory for additional testing.                 Dear Isabell Matthews   Diabetes needs improved control A1C very high at 11.2 goal for you is less than 8.  THis may be reflective of the fact your were not following your usual diabetic diet when you recently travelled. Your glucose was on the lower side at 79 therefore I will recommend diabetes endocrine referral as soon as possible.   It is important to control you glucose well as your creatinine ( kidney function has worsened to stage 3 b renal impairment. You blood pressure was in control at the visit therefore make sure you are taking your blood pressure medications and optimization of glucose control is recommended. Make sure you are hydrating well with water 6 -8  8 ounce fluids daily most of this water.  Your cholesterol and thyroid are in range on current medications please continue.  3 month follow-up with me is recommended.  Best wishes,  Bony Castaneda MD      April 3, 2023  I also recommended nephrology, endocrine visits make sure they are scheduled  Needs antibiotic for UTI I sent Macrobid to her pharmacy for 7  days. Please call them.  Best wishes,  Bony Castaneda MD

## 2023-04-03 NOTE — TELEPHONE ENCOUNTER
RN placed a call to patient, but no voicemail was available.  RN also called [facility] and asked for any other number they might have on file, or if patient is receiving text alerts to notify patient that Rx was sent by Dr. Castaneda.  [facility] staff relayed that patient has opted in for text alerts, and that the patient has replied to these in the past, so patient will receive a text alert today that Rx is ready to be picked up.    Dorina Argueta RN on 4/3/2023 at 9:09 AM

## 2023-04-08 DIAGNOSIS — E10.59 TYPE 1 DIABETES MELLITUS WITH OTHER CIRCULATORY COMPLICATION (H): ICD-10-CM

## 2023-04-10 ENCOUNTER — MYC MEDICAL ADVICE (OUTPATIENT)
Dept: FAMILY MEDICINE | Facility: CLINIC | Age: 63
End: 2023-04-10
Payer: MEDICARE

## 2023-04-11 RX ORDER — CALCIUM CITRATE/VITAMIN D3 200MG-6.25
TABLET ORAL
Qty: 400 STRIP | Refills: 3 | Status: SHIPPED | OUTPATIENT
Start: 2023-04-11 | End: 2023-09-14

## 2023-04-11 NOTE — TELEPHONE ENCOUNTER
3/15/2023  [facility] [place]     Bony Castaneda MD  Family Medicine     Test strips

## 2023-04-14 DIAGNOSIS — E10.42 TYPE 1 DIABETES MELLITUS WITH DIABETIC POLYNEUROPATHY (H): ICD-10-CM

## 2023-04-14 DIAGNOSIS — E10.59 TYPE 1 DIABETES MELLITUS WITH OTHER CIRCULATORY COMPLICATION (H): Primary | ICD-10-CM

## 2023-04-14 RX ORDER — LANCETS
EACH MISCELLANEOUS
Qty: 102 EACH | Refills: 6 | Status: CANCELLED | OUTPATIENT
Start: 2023-04-14

## 2023-04-14 RX ORDER — BLOOD SUGAR DIAGNOSTIC
STRIP MISCELLANEOUS
Qty: 100 STRIP | Refills: 6 | Status: CANCELLED | OUTPATIENT
Start: 2023-04-14

## 2023-04-14 RX ORDER — GLUCOSAMINE HCL/CHONDROITIN SU 500-400 MG
CAPSULE ORAL
Qty: 100 EACH | Refills: 3 | Status: CANCELLED | OUTPATIENT
Start: 2023-04-14

## 2023-04-14 RX ORDER — BLOOD-GLUCOSE CONTROL, NORMAL
EACH MISCELLANEOUS
Qty: 1 EACH | Refills: 3 | Status: CANCELLED | OUTPATIENT
Start: 2023-04-14

## 2023-04-14 NOTE — TELEPHONE ENCOUNTER
Please see the bottom of the fax note.     They are asking for Accu-Chek   Guide meter.  But when I pulled up the meter.  Everything else popped up as well.        Nataly Hare RN  Central Triage Red Flags/Med Refills

## 2023-04-19 NOTE — TELEPHONE ENCOUNTER
RECORDS RECEIVED FROM: internal   REASON FOR VISIT: seizures   Date of Appt: 5/19/23   NOTES (FOR ALL VISITS) STATUS DETAILS   OFFICE NOTE from referring provider Internal Dr Bony Castaneda @ [facility] Med:  3/15/23   OFFICE NOTE from other specialist Internal Dr Marshall Medina @ [facility]:  11/6/20   DISCHARGE SUMMARY from hospital Internal [facility]:  12/10/22-12/14/22  5/3/22-5/14/22 4/5/22-4/7/22   DISCHARGE REPORT from the ER Internal [facility]:  4/10/22   EEG Internal [facility]:  12/10/22-12/22/22  5/3/22-5/4/22   MEDICATION LIST Internal    IMAGING  (FOR ALL VISITS)     MRI (HEAD, NECK, SPINE) [facility]:  MRI Cervical Spine 12/13/22  MRI Brain 12/10/22  MRI Brain 5/3/22  MRI Brain 4/5/22  MRI Brain 6/22/21   CT (HEAD, NECK, SPINE) [facility]:  CT Head 12/10/22  CTA Head Neck 12/10/22  CT Head Perfusion 12/10/22  CTA Head Neck 5/3/22  CT Head 5/3/22  CT Lumbar Spine 4/10/22  CT Thoracic Spine 4/10/22  CT Cervical Spine 4/10/22  CTA Head Neck 4/10/22  CT Head 4/10/22  (additional CT images in PACS)

## 2023-04-25 NOTE — TELEPHONE ENCOUNTER
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 (H) [E10.59]  Stage 3b chronic kidney disease    DATE RECEIVED: 05.22.2023   NOTES STATUS DETAILS   OFFICE NOTE from referring provider Internal 03.31.2023 Bony Castaneda MD   OFFICE NOTE from other specialist  Internal 05.24.2022 Natalie Singer APRN CNP   *Only VASCULITIS or LUPUS gather office notes for the following     *PULMONARY       *ENT     *DERMATOLOGY     *RHEUMATOLOGY     DISCHARGE SUMMARY from hospital Internal 04.05.2022 [facility]   DISCHARGE REPORT from the ER     MEDICATION LIST Internal    IMAGING  (NEED IMAGES AND REPORTS)     KIDNEY CT SCAN     KIDNEY ULTRASOUND     MR ABDOMEN     NUCLEAR MEDICINE RENAL     LABS     CBC Internal 03.29.2023   CMP Internal 03.29.2023   BMP Internal 12.12.2022   UA Internal 03.31.2023   URINE PROTEIN Internal 03.31.2023   RENAL PANEL     BIOPSY     KIDNEY BIOPSY

## 2023-05-08 ENCOUNTER — HEALTH MAINTENANCE LETTER (OUTPATIENT)
Age: 63
End: 2023-05-08

## 2023-05-19 ENCOUNTER — PRE VISIT (OUTPATIENT)
Dept: NEUROLOGY | Facility: CLINIC | Age: 63
End: 2023-05-19

## 2023-05-20 ENCOUNTER — APPOINTMENT (OUTPATIENT)
Dept: MRI IMAGING | Facility: CLINIC | Age: 63
DRG: 065 | End: 2023-05-20
Attending: EMERGENCY MEDICINE
Payer: COMMERCIAL

## 2023-05-20 ENCOUNTER — APPOINTMENT (OUTPATIENT)
Dept: CT IMAGING | Facility: CLINIC | Age: 63
DRG: 065 | End: 2023-05-20
Payer: COMMERCIAL

## 2023-05-20 ENCOUNTER — HOSPITAL ENCOUNTER (INPATIENT)
Facility: CLINIC | Age: 63
LOS: 2 days | Discharge: HOME | DRG: 065 | End: 2023-05-22
Attending: EMERGENCY MEDICINE | Admitting: PSYCHIATRY & NEUROLOGY
Payer: COMMERCIAL

## 2023-05-20 DIAGNOSIS — N30.00 ACUTE CYSTITIS WITHOUT HEMATURIA: ICD-10-CM

## 2023-05-20 DIAGNOSIS — F03.C0 SEVERE DEMENTIA WITHOUT BEHAVIORAL DISTURBANCE, PSYCHOTIC DISTURBANCE, MOOD DISTURBANCE, OR ANXIETY, UNSPECIFIED DEMENTIA TYPE (H): ICD-10-CM

## 2023-05-20 DIAGNOSIS — E10.59 TYPE 1 DIABETES MELLITUS WITH OTHER CIRCULATORY COMPLICATION (H): ICD-10-CM

## 2023-05-20 DIAGNOSIS — Z20.822 LAB TEST NEGATIVE FOR COVID-19 VIRUS: ICD-10-CM

## 2023-05-20 DIAGNOSIS — I63.9 ACUTE CVA (CEREBROVASCULAR ACCIDENT) (H): ICD-10-CM

## 2023-05-20 DIAGNOSIS — I63.9 CEREBROVASCULAR ACCIDENT (CVA), UNSPECIFIED MECHANISM (H): Primary | ICD-10-CM

## 2023-05-20 DIAGNOSIS — N39.0 RECURRENT UTI: ICD-10-CM

## 2023-05-20 DIAGNOSIS — E10.42 TYPE 1 DIABETES MELLITUS WITH DIABETIC POLYNEUROPATHY (H): ICD-10-CM

## 2023-05-20 DIAGNOSIS — R56.9 SEIZURES (H): ICD-10-CM

## 2023-05-20 LAB
ALBUMIN SERPL BCG-MCNC: 3.7 G/DL (ref 3.5–5.2)
ALBUMIN UR-MCNC: 50 MG/DL
ALP SERPL-CCNC: 189 U/L (ref 35–104)
ALT SERPL W P-5'-P-CCNC: 17 U/L (ref 10–35)
ANION GAP SERPL CALCULATED.3IONS-SCNC: 12 MMOL/L (ref 7–15)
APPEARANCE UR: ABNORMAL
AST SERPL W P-5'-P-CCNC: 17 U/L (ref 10–35)
BACTERIA #/AREA URNS HPF: ABNORMAL /HPF
BASOPHILS # BLD AUTO: 0 10E3/UL (ref 0–0.2)
BASOPHILS NFR BLD AUTO: 0 %
BILIRUB SERPL-MCNC: 0.3 MG/DL
BILIRUB UR QL STRIP: NEGATIVE
BUN SERPL-MCNC: 38.2 MG/DL (ref 8–23)
CALCIUM SERPL-MCNC: 9.5 MG/DL (ref 8.8–10.2)
CHLORIDE SERPL-SCNC: 105 MMOL/L (ref 98–107)
CHOLEST SERPL-MCNC: 136 MG/DL
COLOR UR AUTO: YELLOW
CREAT SERPL-MCNC: 1.71 MG/DL (ref 0.51–0.95)
CREAT SERPL-MCNC: 1.71 MG/DL (ref 0.51–0.95)
DEPRECATED HCO3 PLAS-SCNC: 25 MMOL/L (ref 22–29)
EOSINOPHIL # BLD AUTO: 0 10E3/UL (ref 0–0.7)
EOSINOPHIL NFR BLD AUTO: 1 %
ERYTHROCYTE [DISTWIDTH] IN BLOOD BY AUTOMATED COUNT: 13.4 % (ref 10–15)
GFR SERPL CREATININE-BSD FRML MDRD: 33 ML/MIN/1.73M2
GFR SERPL CREATININE-BSD FRML MDRD: 33 ML/MIN/1.73M2
GLUCOSE BLDC GLUCOMTR-MCNC: 117 MG/DL (ref 70–99)
GLUCOSE SERPL-MCNC: 149 MG/DL (ref 70–99)
GLUCOSE UR STRIP-MCNC: NEGATIVE MG/DL
HBA1C MFR BLD: 10.8 %
HCT VFR BLD AUTO: 34.9 % (ref 35–47)
HDLC SERPL-MCNC: 35 MG/DL
HGB BLD-MCNC: 10.9 G/DL (ref 11.7–15.7)
HGB UR QL STRIP: ABNORMAL
HOLD SPECIMEN: NORMAL
IMM GRANULOCYTES # BLD: 0 10E3/UL
IMM GRANULOCYTES NFR BLD: 1 %
KETONES UR STRIP-MCNC: NEGATIVE MG/DL
LDLC SERPL CALC-MCNC: 71 MG/DL
LEUKOCYTE ESTERASE UR QL STRIP: ABNORMAL
LEVETIRACETAM SERPL-MCNC: <2 ΜG/ML (ref 10–40)
LYMPHOCYTES # BLD AUTO: 1.4 10E3/UL (ref 0.8–5.3)
LYMPHOCYTES NFR BLD AUTO: 17 %
MCH RBC QN AUTO: 28.8 PG (ref 26.5–33)
MCHC RBC AUTO-ENTMCNC: 31.2 G/DL (ref 31.5–36.5)
MCV RBC AUTO: 92 FL (ref 78–100)
MONOCYTES # BLD AUTO: 0.6 10E3/UL (ref 0–1.3)
MONOCYTES NFR BLD AUTO: 8 %
MUCOUS THREADS #/AREA URNS LPF: PRESENT /LPF
NEUTROPHILS # BLD AUTO: 6.1 10E3/UL (ref 1.6–8.3)
NEUTROPHILS NFR BLD AUTO: 73 %
NITRATE UR QL: NEGATIVE
NONHDLC SERPL-MCNC: 101 MG/DL
NRBC # BLD AUTO: 0 10E3/UL
NRBC BLD AUTO-RTO: 0 /100
PH UR STRIP: 5.5 [PH] (ref 5–7)
PLATELET # BLD AUTO: 294 10E3/UL (ref 150–450)
POTASSIUM SERPL-SCNC: 4.3 MMOL/L (ref 3.4–5.3)
PROT SERPL-MCNC: 6.9 G/DL (ref 6.4–8.3)
RADIOLOGIST FLAGS: ABNORMAL
RBC # BLD AUTO: 3.79 10E6/UL (ref 3.8–5.2)
RBC URINE: 12 /HPF
SODIUM SERPL-SCNC: 142 MMOL/L (ref 136–145)
SP GR UR STRIP: 1.02 (ref 1–1.03)
TRIGL SERPL-MCNC: 152 MG/DL
TROPONIN T SERPL HS-MCNC: 36 NG/L
UROBILINOGEN UR STRIP-MCNC: NORMAL MG/DL
WBC # BLD AUTO: 8.2 10E3/UL (ref 4–11)
WBC CLUMPS #/AREA URNS HPF: PRESENT /HPF
WBC URINE: >182 /HPF
YEAST #/AREA URNS HPF: ABNORMAL /HPF
YEAST #/AREA URNS HPF: ABNORMAL /HPF

## 2023-05-20 PROCEDURE — 70498 CT ANGIOGRAPHY NECK: CPT

## 2023-05-20 PROCEDURE — BW191ZZ FLUOROSCOPY OF HEAD AND NECK USING LOW OSMOLAR CONTRAST: ICD-10-PCS | Performed by: RADIOLOGY

## 2023-05-20 PROCEDURE — 255N000002 HC RX 255 OP 636: Performed by: EMERGENCY MEDICINE

## 2023-05-20 PROCEDURE — 250N000013 HC RX MED GY IP 250 OP 250 PS 637: Performed by: STUDENT IN AN ORGANIZED HEALTH CARE EDUCATION/TRAINING PROGRAM

## 2023-05-20 PROCEDURE — 258N000003 HC RX IP 258 OP 636: Performed by: EMERGENCY MEDICINE

## 2023-05-20 PROCEDURE — 93005 ELECTROCARDIOGRAM TRACING: CPT | Performed by: EMERGENCY MEDICINE

## 2023-05-20 PROCEDURE — A9585 GADOBUTROL INJECTION: HCPCS | Performed by: EMERGENCY MEDICINE

## 2023-05-20 PROCEDURE — 83036 HEMOGLOBIN GLYCOSYLATED A1C: CPT | Performed by: STUDENT IN AN ORGANIZED HEALTH CARE EDUCATION/TRAINING PROGRAM

## 2023-05-20 PROCEDURE — 82962 GLUCOSE BLOOD TEST: CPT

## 2023-05-20 PROCEDURE — 87106 FUNGI IDENTIFICATION YEAST: CPT | Performed by: EMERGENCY MEDICINE

## 2023-05-20 PROCEDURE — 70551 MRI BRAIN STEM W/O DYE: CPT

## 2023-05-20 PROCEDURE — 96365 THER/PROPH/DIAG IV INF INIT: CPT | Performed by: EMERGENCY MEDICINE

## 2023-05-20 PROCEDURE — 85025 COMPLETE CBC W/AUTO DIFF WBC: CPT | Performed by: EMERGENCY MEDICINE

## 2023-05-20 PROCEDURE — 84484 ASSAY OF TROPONIN QUANT: CPT | Performed by: STUDENT IN AN ORGANIZED HEALTH CARE EDUCATION/TRAINING PROGRAM

## 2023-05-20 PROCEDURE — 36415 COLL VENOUS BLD VENIPUNCTURE: CPT | Performed by: EMERGENCY MEDICINE

## 2023-05-20 PROCEDURE — 36415 COLL VENOUS BLD VENIPUNCTURE: CPT | Performed by: STUDENT IN AN ORGANIZED HEALTH CARE EDUCATION/TRAINING PROGRAM

## 2023-05-20 PROCEDURE — 250N000011 HC RX IP 250 OP 636: Performed by: EMERGENCY MEDICINE

## 2023-05-20 PROCEDURE — 250N000011 HC RX IP 250 OP 636: Performed by: PSYCHIATRY & NEUROLOGY

## 2023-05-20 PROCEDURE — 93010 ELECTROCARDIOGRAM REPORT: CPT | Performed by: EMERGENCY MEDICINE

## 2023-05-20 PROCEDURE — 250N000013 HC RX MED GY IP 250 OP 250 PS 637: Performed by: EMERGENCY MEDICINE

## 2023-05-20 PROCEDURE — 81001 URINALYSIS AUTO W/SCOPE: CPT | Performed by: EMERGENCY MEDICINE

## 2023-05-20 PROCEDURE — 99285 EMERGENCY DEPT VISIT HI MDM: CPT | Mod: 25 | Performed by: EMERGENCY MEDICINE

## 2023-05-20 PROCEDURE — 70551 MRI BRAIN STEM W/O DYE: CPT | Mod: 26 | Performed by: RADIOLOGY

## 2023-05-20 PROCEDURE — 250N000011 HC RX IP 250 OP 636: Performed by: STUDENT IN AN ORGANIZED HEALTH CARE EDUCATION/TRAINING PROGRAM

## 2023-05-20 PROCEDURE — 70498 CT ANGIOGRAPHY NECK: CPT | Mod: 26 | Performed by: RADIOLOGY

## 2023-05-20 PROCEDURE — 80053 COMPREHEN METABOLIC PANEL: CPT | Performed by: EMERGENCY MEDICINE

## 2023-05-20 PROCEDURE — 70496 CT ANGIOGRAPHY HEAD: CPT

## 2023-05-20 PROCEDURE — 80061 LIPID PANEL: CPT | Performed by: STUDENT IN AN ORGANIZED HEALTH CARE EDUCATION/TRAINING PROGRAM

## 2023-05-20 PROCEDURE — 120N000002 HC R&B MED SURG/OB UMMC

## 2023-05-20 PROCEDURE — C9803 HOPD COVID-19 SPEC COLLECT: HCPCS | Performed by: EMERGENCY MEDICINE

## 2023-05-20 PROCEDURE — 80177 DRUG SCRN QUAN LEVETIRACETAM: CPT | Performed by: STUDENT IN AN ORGANIZED HEALTH CARE EDUCATION/TRAINING PROGRAM

## 2023-05-20 PROCEDURE — 70496 CT ANGIOGRAPHY HEAD: CPT | Mod: 26 | Performed by: RADIOLOGY

## 2023-05-20 PROCEDURE — 96375 TX/PRO/DX INJ NEW DRUG ADDON: CPT | Performed by: EMERGENCY MEDICINE

## 2023-05-20 RX ORDER — CLOPIDOGREL BISULFATE 75 MG/1
300 TABLET ORAL ONCE
Status: COMPLETED | OUTPATIENT
Start: 2023-05-20 | End: 2023-05-20

## 2023-05-20 RX ORDER — LANOLIN ALCOHOL/MO/W.PET/CERES
1000 CREAM (GRAM) TOPICAL DAILY
Status: DISCONTINUED | OUTPATIENT
Start: 2023-05-21 | End: 2023-05-22 | Stop reason: HOSPADM

## 2023-05-20 RX ORDER — DEXTROSE MONOHYDRATE 25 G/50ML
25-50 INJECTION, SOLUTION INTRAVENOUS
Status: DISCONTINUED | OUTPATIENT
Start: 2023-05-20 | End: 2023-05-22 | Stop reason: HOSPADM

## 2023-05-20 RX ORDER — HYDRALAZINE HYDROCHLORIDE 20 MG/ML
10-20 INJECTION INTRAMUSCULAR; INTRAVENOUS
Status: DISCONTINUED | OUTPATIENT
Start: 2023-05-20 | End: 2023-05-22

## 2023-05-20 RX ORDER — CEFTRIAXONE 1 G/1
1 INJECTION, POWDER, FOR SOLUTION INTRAMUSCULAR; INTRAVENOUS EVERY 24 HOURS
Status: DISCONTINUED | OUTPATIENT
Start: 2023-05-21 | End: 2023-05-21

## 2023-05-20 RX ORDER — CEFTRIAXONE 1 G/1
1 INJECTION, POWDER, FOR SOLUTION INTRAMUSCULAR; INTRAVENOUS ONCE
Status: COMPLETED | OUTPATIENT
Start: 2023-05-20 | End: 2023-05-20

## 2023-05-20 RX ORDER — NICOTINE POLACRILEX 4 MG
15-30 LOZENGE BUCCAL
Status: DISCONTINUED | OUTPATIENT
Start: 2023-05-20 | End: 2023-05-22 | Stop reason: HOSPADM

## 2023-05-20 RX ORDER — SODIUM CHLORIDE 9 MG/ML
INJECTION, SOLUTION INTRAVENOUS CONTINUOUS
Status: DISCONTINUED | OUTPATIENT
Start: 2023-05-20 | End: 2023-05-20

## 2023-05-20 RX ORDER — IOPAMIDOL 755 MG/ML
75 INJECTION, SOLUTION INTRAVASCULAR ONCE
Status: COMPLETED | OUTPATIENT
Start: 2023-05-20 | End: 2023-05-20

## 2023-05-20 RX ORDER — HALOPERIDOL 5 MG/ML
2 INJECTION INTRAMUSCULAR EVERY 10 MIN PRN
Status: DISCONTINUED | OUTPATIENT
Start: 2023-05-20 | End: 2023-05-20

## 2023-05-20 RX ORDER — DOXAZOSIN 1 MG/1
1 TABLET ORAL AT BEDTIME
Status: DISCONTINUED | OUTPATIENT
Start: 2023-05-20 | End: 2023-05-22 | Stop reason: HOSPADM

## 2023-05-20 RX ORDER — VITAMIN B COMPLEX
25 TABLET ORAL DAILY
Status: DISCONTINUED | OUTPATIENT
Start: 2023-05-21 | End: 2023-05-22 | Stop reason: HOSPADM

## 2023-05-20 RX ORDER — LABETALOL HYDROCHLORIDE 5 MG/ML
10-20 INJECTION, SOLUTION INTRAVENOUS EVERY 10 MIN PRN
Status: DISCONTINUED | OUTPATIENT
Start: 2023-05-20 | End: 2023-05-22

## 2023-05-20 RX ORDER — LEVETIRACETAM 500 MG/1
500 TABLET ORAL 2 TIMES DAILY
Status: DISCONTINUED | OUTPATIENT
Start: 2023-05-20 | End: 2023-05-22 | Stop reason: HOSPADM

## 2023-05-20 RX ORDER — CLOPIDOGREL BISULFATE 75 MG/1
75 TABLET ORAL DAILY
Status: DISCONTINUED | OUTPATIENT
Start: 2023-05-21 | End: 2023-05-22 | Stop reason: HOSPADM

## 2023-05-20 RX ORDER — ASPIRIN 81 MG/1
81 TABLET, CHEWABLE ORAL DAILY
Status: DISCONTINUED | OUTPATIENT
Start: 2023-05-21 | End: 2023-05-22 | Stop reason: HOSPADM

## 2023-05-20 RX ORDER — ATORVASTATIN CALCIUM 40 MG/1
40 TABLET, FILM COATED ORAL DAILY
Status: DISCONTINUED | OUTPATIENT
Start: 2023-05-21 | End: 2023-05-22 | Stop reason: HOSPADM

## 2023-05-20 RX ORDER — ENOXAPARIN SODIUM 100 MG/ML
40 INJECTION SUBCUTANEOUS EVERY 24 HOURS
Status: DISCONTINUED | OUTPATIENT
Start: 2023-05-20 | End: 2023-05-22

## 2023-05-20 RX ORDER — FLUCONAZOLE 150 MG/1
150 TABLET ORAL ONCE
Status: COMPLETED | OUTPATIENT
Start: 2023-05-20 | End: 2023-05-20

## 2023-05-20 RX ORDER — CARVEDILOL 12.5 MG/1
12.5 TABLET ORAL 2 TIMES DAILY WITH MEALS
Status: DISCONTINUED | OUTPATIENT
Start: 2023-05-20 | End: 2023-05-22 | Stop reason: HOSPADM

## 2023-05-20 RX ORDER — ACETAMINOPHEN 500 MG
500 TABLET ORAL EVERY 6 HOURS PRN
Status: DISCONTINUED | OUTPATIENT
Start: 2023-05-20 | End: 2023-05-22 | Stop reason: HOSPADM

## 2023-05-20 RX ORDER — LORAZEPAM 2 MG/ML
2 INJECTION INTRAMUSCULAR EVERY 5 MIN PRN
Status: DISCONTINUED | OUTPATIENT
Start: 2023-05-20 | End: 2023-05-20

## 2023-05-20 RX ORDER — GADOBUTROL 604.72 MG/ML
0.1 INJECTION INTRAVENOUS ONCE
Status: COMPLETED | OUTPATIENT
Start: 2023-05-20 | End: 2023-05-20

## 2023-05-20 RX ORDER — LIDOCAINE 40 MG/G
CREAM TOPICAL
Status: DISCONTINUED | OUTPATIENT
Start: 2023-05-20 | End: 2023-05-22 | Stop reason: HOSPADM

## 2023-05-20 RX ORDER — DULOXETIN HYDROCHLORIDE 20 MG/1
20 CAPSULE, DELAYED RELEASE ORAL DAILY
Status: DISCONTINUED | OUTPATIENT
Start: 2023-05-21 | End: 2023-05-22 | Stop reason: HOSPADM

## 2023-05-20 RX ORDER — LEVOTHYROXINE SODIUM 75 UG/1
75 TABLET ORAL DAILY
Status: DISCONTINUED | OUTPATIENT
Start: 2023-05-21 | End: 2023-05-22 | Stop reason: HOSPADM

## 2023-05-20 RX ADMIN — ENOXAPARIN SODIUM 40 MG: 40 INJECTION SUBCUTANEOUS at 19:57

## 2023-05-20 RX ADMIN — CLOPIDOGREL BISULFATE 300 MG: 75 TABLET ORAL at 19:56

## 2023-05-20 RX ADMIN — SODIUM CHLORIDE: 9 INJECTION, SOLUTION INTRAVENOUS at 16:24

## 2023-05-20 RX ADMIN — CEFTRIAXONE SODIUM 1 G: 1 INJECTION, POWDER, FOR SOLUTION INTRAMUSCULAR; INTRAVENOUS at 16:28

## 2023-05-20 RX ADMIN — HALOPERIDOL LACTATE 2 MG: 5 INJECTION, SOLUTION INTRAMUSCULAR at 16:22

## 2023-05-20 RX ADMIN — FLUCONAZOLE 150 MG: 150 TABLET ORAL at 16:36

## 2023-05-20 RX ADMIN — CARVEDILOL 12.5 MG: 12.5 TABLET, FILM COATED ORAL at 19:57

## 2023-05-20 RX ADMIN — LEVETIRACETAM 500 MG: 500 TABLET, FILM COATED ORAL at 19:57

## 2023-05-20 RX ADMIN — IOPAMIDOL 75 ML: 755 INJECTION, SOLUTION INTRAVENOUS at 20:32

## 2023-05-20 ASSESSMENT — ACTIVITIES OF DAILY LIVING (ADL)
ADLS_ACUITY_SCORE: 35

## 2023-05-20 NOTE — ED PROVIDER NOTES
ED Provider Note  [facility]      History     Chief Complaint   Patient presents with     Seizures     HPI  Isabell Matthews is a 62 year old female with a history of dementia who according to family members possibly had a seizure this morning.  The patient was found with a blood sugar of 137 by paramedics. Patient is a very poor historian herself and was sent here by ambulance for further evaluation.  The patient has a history of previous CVA, seizures, and chronic pain and the patient presents here for evaluation via ambulance at apparently her usual baseline.  Patient states she does have a mild headache, but has no nausea, no focal numbness, or weakness and is not sure exactly why she is here in the ER.    This part of the medical record was transcribed by Elizabeht Zuñiga, Medical Scribe, from a dictation done by Jeison Hayes MD.     Past Medical History  Past Medical History:   Diagnosis Date     Chronic pain      Coronary atherosclerosis 6/14/2016     Essential hypertension      Ex-cigarette smoker     quit 7/2018 over 35 years     Ex-cigarette smoker      Hyperlipidemia      Hypothyroid      Seizures (H)      Stroke (H)      Vascular dementia (H)      Past Surgical History:   Procedure Laterality Date     athroplasty hip Bilateral     2003, 2006     OTHER SURGICAL HISTORY      athroplasty hip     STENT       acetaminophen (TYLENOL) 500 MG tablet  aspirin (ASA) 81 MG chewable tablet  atorvastatin (LIPITOR) 40 MG tablet  blood glucose monitoring (NO BRAND SPECIFIED) meter device kit  carvedilol (COREG) 12.5 MG tablet  chlorhexidine (PERIDEX) 0.12 % solution  cyanocobalamin (VITAMIN B-12) 1000 MCG tablet  diclofenac (VOLTAREN) 1 % topical gel  doxazosin (CARDURA) 1 MG tablet  DULoxetine (CYMBALTA) 20 MG capsule  gabapentin (NEURONTIN) 100 MG capsule  insulin glargine (LANTUS SOLOSTAR) 100 UNIT/ML pen  insulin lispro (HUMALOG KWIKPEN) 100 UNIT/ML (1 unit dial) KWIKPEN  insulin pen  needle (31G X 8 MM) 31G X 8 MM miscellaneous  levETIRAcetam (KEPPRA) 500 MG tablet  loratadine (CLARITIN) 10 mg tablet  melatonin 10 mg Tab  methylcellulose (CITRUCEL) powder  nystatin (MYCOSTATIN) 675149 UNIT/GM external cream  SYNTHROID 75 MCG tablet  TRUE METRIX BLOOD GLUCOSE TEST test strip  Vitamin D3 (CHOLECALCIFEROL) 25 mcg (1000 units) tablet  zinc oxide (DESITIN) 20 % external ointment      Allergies   Allergen Reactions     Seasonal Allergies      Family History  Family History   Problem Relation Age of Onset     Acute Myocardial Infarction Father      Heart Disease Maternal Grandmother      Dementia Maternal Grandmother      Myocardial Infarction Father      Dementia Paternal Grandmother      Heart Disease Paternal Grandmother      Social History   Social History     Tobacco Use     Smoking status: Former     Packs/day: 0.50     Years: 35.00     Pack years: 17.50     Types: Cigarettes     Quit date: 2018     Years since quittin.8     Smokeless tobacco: Never   Substance Use Topics     Alcohol use: Not Currently     Drug use: Not Currently         A medically appropriate review of systems was performed with pertinent positives and negatives noted in the HPI, and all other systems negative.    Physical Exam   BP: (!) 151/84  Pulse: 74  Temp: 98.5  F (36.9  C)  Resp: 18  SpO2: 100 %  Physical Exam  Vitals and nursing note reviewed.   Constitutional:       Comments: Demented and a very poor historian  No acute distress   HENT:      Head: Atraumatic.   Eyes:      Extraocular Movements: Extraocular movements intact.      Pupils: Pupils are equal, round, and reactive to light.   Cardiovascular:      Heart sounds: Normal heart sounds.   Pulmonary:      Breath sounds: Normal breath sounds.   Abdominal:      Palpations: Abdomen is soft.      Tenderness: There is no abdominal tenderness.   Musculoskeletal:         General: No deformity.      Cervical back: Neck supple.   Neurological:      Mental Status: She  is alert.      Comments: Patient has grossly symmetric strength bilaterally but may have a left facial droop   Psychiatric:         Mood and Affect: Mood normal.           ED Course, Procedures, & Data      Procedures             Patient was seen in the hallway and had an EKG done which revealed normal sinus rhythm at a rate of 74 with a NV interval point 134 and a QRS duration of point 086.  The patient had a normal axis with no acute ST or T wave changes significant for ischemia.  This was read by me personally.    Results for orders placed or performed during the hospital encounter of 05/20/23   MR Brain w/o Contrast     Status: None (Preliminary result)    Impression    RESIDENT PRELIMINARY INTERPRETATION  IMPRESSION:  1. Acute infarcts within the right paramedian chuck and right  parasagittal frontal lobe.  2. No abnormal signal within the medial temporal lobes.  3. Contrast portion of the exam was unable to be completed due to  patient intolerance.    [Urgent Result: Acute infarct]    Finding was identified on 5/20/2023 4:15 PM.     Dr. Freddy BALTAZAR was contacted by Dr. Juan at 5/20/2023 4:38 PM  and verbalized understanding of the urgent finding.    [place] Draw     Status: None    Narrative    The following orders were created for panel order [place] Draw.  Procedure                               Abnormality         Status                     ---------                               -----------         ------                     Extra Blue Top Tube[[id number]]                              Final result               Extra Red Top Tube[[id number]]                               Final result               Extra Green Top (Lithium...[[id number]]                      Final result               Extra Purple Top Tube[[id number]]                            Final result                 Please view results for these tests on the individual orders.   Extra Blue Top Tube     Status: None   Result Value Ref Range    Hold  Specimen JIC    Extra Red Top Tube     Status: None   Result Value Ref Range    Hold Specimen JIC    Extra Green Top (Lithium Heparin) Tube     Status: None   Result Value Ref Range    Hold Specimen JIC    Extra Purple Top Tube     Status: None   Result Value Ref Range    Hold Specimen JIC    Comprehensive metabolic panel     Status: Abnormal   Result Value Ref Range    Sodium 142 136 - 145 mmol/L    Potassium 4.3 3.4 - 5.3 mmol/L    Chloride 105 98 - 107 mmol/L    Carbon Dioxide (CO2) 25 22 - 29 mmol/L    Anion Gap 12 7 - 15 mmol/L    Urea Nitrogen 38.2 (H) 8.0 - 23.0 mg/dL    Creatinine 1.71 (H) 0.51 - 0.95 mg/dL    Calcium 9.5 8.8 - 10.2 mg/dL    Glucose 149 (H) 70 - 99 mg/dL    Alkaline Phosphatase 189 (H) 35 - 104 U/L    AST 17 10 - 35 U/L    ALT 17 10 - 35 U/L    Protein Total 6.9 6.4 - 8.3 g/dL    Albumin 3.7 3.5 - 5.2 g/dL    Bilirubin Total 0.3 <=1.2 mg/dL    GFR Estimate 33 (L) >60 mL/min/1.73m2   UA with Microscopic reflex to Culture     Status: Abnormal    Specimen: Urine, Catheter   Result Value Ref Range    Color Urine Yellow Colorless, Straw, Light Yellow, Yellow    Appearance Urine Slightly Cloudy (A) Clear    Glucose Urine Negative Negative mg/dL    Bilirubin Urine Negative Negative    Ketones Urine Negative Negative mg/dL    Specific Gravity Urine 1.016 1.003 - 1.035    Blood Urine Small (A) Negative    pH Urine 5.5 5.0 - 7.0    Protein Albumin Urine 50 (A) Negative mg/dL    Urobilinogen Urine Normal Normal, 2.0 mg/dL    Nitrite Urine Negative Negative    Leukocyte Esterase Urine Large (A) Negative    Bacteria Urine Many (A) None Seen /HPF    WBC Clumps Urine Present (A) None Seen /HPF    Budding Yeast Urine Few (A) None Seen /HPF    Hyphal Yeast Urine Few (A) None Seen /HPF    Mucus Urine Present (A) None Seen /LPF    RBC Urine 12 (H) <=2 /HPF    WBC Urine >182 (H) <=5 /HPF    Narrative    Urine Culture ordered based on laboratory criteria   CBC with platelets and differential     Status: Abnormal    Result Value Ref Range    WBC Count 8.2 4.0 - 11.0 10e3/uL    RBC Count 3.79 (L) 3.80 - 5.20 10e6/uL    Hemoglobin 10.9 (L) 11.7 - 15.7 g/dL    Hematocrit 34.9 (L) 35.0 - 47.0 %    MCV 92 78 - 100 fL    MCH 28.8 26.5 - 33.0 pg    MCHC 31.2 (L) 31.5 - 36.5 g/dL    RDW 13.4 10.0 - 15.0 %    Platelet Count 294 150 - 450 10e3/uL    % Neutrophils 73 %    % Lymphocytes 17 %    % Monocytes 8 %    % Eosinophils 1 %    % Basophils 0 %    % Immature Granulocytes 1 %    NRBCs per 100 WBC 0 <1 /100    Absolute Neutrophils 6.1 1.6 - 8.3 10e3/uL    Absolute Lymphocytes 1.4 0.8 - 5.3 10e3/uL    Absolute Monocytes 0.6 0.0 - 1.3 10e3/uL    Absolute Eosinophils 0.0 0.0 - 0.7 10e3/uL    Absolute Basophils 0.0 0.0 - 0.2 10e3/uL    Absolute Immature Granulocytes 0.0 <=0.4 10e3/uL    Absolute NRBCs 0.0 10e3/uL   EKG 12-lead, tracing only     Status: None (Preliminary result)   Result Value Ref Range    Systolic Blood Pressure  mmHg    Diastolic Blood Pressure  mmHg    Ventricular Rate 74 BPM    Atrial Rate 74 BPM    TN Interval 134 ms    QRS Duration 86 ms     ms    QTc 446 ms    P Axis 59 degrees    R AXIS 36 degrees    T Axis 26 degrees    Interpretation ECG Sinus rhythm  Normal ECG      CBC with platelets differential     Status: Abnormal    Narrative    The following orders were created for panel order CBC with platelets differential.  Procedure                               Abnormality         Status                     ---------                               -----------         ------                     CBC with platelets and d...[[id number]]  Abnormal            Final result                 Please view results for these tests on the individual orders.     Medications   sodium chloride (PF) 0.9% PF flush 3 mL ( Intracatheter Canceled Entry 5/20/23 1321)   sodium chloride (PF) 0.9% PF flush 3 mL (has no administration in time range)   sodium chloride 0.9% infusion ( Intravenous $New Bag 5/20/23 9341)   LORazepam (ATIVAN)  injection 2 mg (has no administration in time range)   haloperidol lactate (HALDOL) injection 2 mg (2 mg Intravenous $Given 5/20/23 1622)   gadobutrol (GADAVIST) injection 5.77 mL (5.7 mLs Intravenous Not Given 5/20/23 1512)   cefTRIAXone (ROCEPHIN) 1 g vial to attach to  mL bag for ADULTS or NS 50 mL bag for PEDS (0 g Intravenous Stopped 5/20/23 1728)   fluconazole (DIFLUCAN) tablet 150 mg (150 mg Oral $Given 5/20/23 1636)       Critical care was not performed.     Medical Decision Making  The patient's presentation was of high complexity (an acute health issue posing potential threat to life or bodily function).    The patient's evaluation involved:  ordering and/or review of 3+ test(s) in this encounter (See above)  discussion of management or test interpretation with another health professional (Case discussed with stroke neurology)    The patient's management necessitated high risk (a decision regarding hospitalization).      Assessment & Plan      I have reviewed the nursing notes.    Patient found by testing to have both acute CVA and cystitis.  Patient was seen in the ER by stroke neurology and at this time will be admitted to their service.        Final diagnoses:   Cerebrovascular accident (CVA), unspecified mechanism (H) - acute   Acute cystitis without hematuria   Severe dementia without behavioral disturbance, psychotic disturbance, mood disturbance, or anxiety, unspecified dementia type (H)     Adm Neuro    Jeison Hayes Md  [facility] EMERGENCY DEPARTMENT  5/20/2023     Jeison Hayes MD  05/20/23 7818

## 2023-05-20 NOTE — LETTER
5/20/2023       RE: Isabell Matthews  [address]  [place] [address]     Dear Colleague,    Thank you for referring your patient, Isabell Matthews, to the [facility] [address] [facility] at [facility]. Please see a copy of my visit note below.       05/21/23 5805   Appointment Info   Signing Clinician's Name / Credentials (SLP) Julieta Mckeon MS CCC-SLP   General Information   Onset of Illness/Injury or Date of Surgery 05/20/23   Referring Physician Zoltan Albert MD   Patient/Family Therapy Goal Statement (SLP) None stated   Pertinent History of Current Problem Isabell Matthews is a 62 year old female with a history of epilepsy, T2DM, recurrent UTI, CKD, vascular vs Alzheimer's dementia, and previous stroke who presents for evaluation of seizure like activity. History is limited to chart review, as attempts to reach daughter were unsuccessful. The patient was reportedly at home with her daughter today when she had an episode of seizure like activity. No description of the event is available, but per EMS on arrival the patient was at her baseline. The patient was brought in for evaluation and initially complained of a mild HA. She was found to have a UTI and was treated with ceftriaxone. She underwent a MRI brain and required Haldol 2 mg for procedural sedation. This MRI was notable for multifocal stroke of right paramedian chuck and punctate right MCA territory stroke. The patient reportedly was not complaining of any new symptoms.     On evaluation, the patient is not answering any questions with minimal attempts to speak despite repeated prompting. She is able to write her name and nod and shake her head to some questions and does indicate that she has a history of seizure and shrugs when asked if had a seizure today. Has had a history of non-compliance in the past but unable to assess compliance given limited history. Clinical swallow eval completed at  0845 per MD orders.   General Observations Pt nonverbal, did not follow any commands   Type of Evaluation   Type of Evaluation Swallow Evaluation   Oral Motor   Oral Musculature unable to assess due to poor participation/comprehension   Dentition (Oral Motor)   Dentition (Oral Motor) adequate dentition   Facial Symmetry (Oral Motor)   Facial Symmetry (Oral Motor) unable/difficult to assess   Lip Function (Oral Motor)   Lip Range of Motion (Oral Motor) unable/difficult to assess   Tongue Function (Oral Motor)   Tongue ROM (Oral Motor) unable/difficult to assess   Jaw Function (Oral Motor)   Jaw Function (Oral Motor) WNL   Cough/Swallow/Gag Reflex (Oral Motor)   Volitional Throat Clear/Cough (Oral Motor) unable/difficult to assess   Vocal Quality/Secretion Management (Oral Motor)   Vocal Quality (Oral Motor) unable/difficult to assess  (pt did not vocalize despite max encouragement)   Secretion Management (Oral Motor) WNL   General Swallowing Observations   Past History of Dysphagia Pt familiar to SLP caseload with recommendations for pureed diet and thin liquids 12/2022. No family present to provide case hx. Pt nonverbal and unable to provide hx.   Respiratory Support (General Swallowing Observations) none   Current Diet/Method of Nutritional Intake (General Swallowing Observations, NIS) thin liquids (level 0);regular diet   Swallowing Evaluation Clinical swallow evaluation   Clinical Swallow Evaluation   Feeding Assistance dependent   Clinical Swallow Evaluation Textures Trialed thin liquids;pureed;solid foods   Clinical Swallow Eval: Thin Liquid Texture Trial   Mode of Presentation, Thin Liquids straw;fed by clinician   Volume of Liquid or Food Presented 3 oz   Oral Phase of Swallow   (bolus holding)   Pharyngeal Phase of Swallow intact   Diagnostic Statement No overt s/sx of aspiration, pt with frequent bolus holding which required max cues to swallow.   Clinical Swallow Evaluation: Puree Solid Texture Trial    Mode of Presentation, Puree spoon;fed by clinician   Volume of Puree Presented 2 tbsp   Oral Phase, Puree   (bolus holding)   Oral Residue, Puree mid posterior tongue   Pharyngeal Phase, Puree intact   Diagnostic Statement No overt s/sx of aspiration, pt with frequent bolus holding which required max cues to swallow.   Clinical Swallow Evaluation: Solid Food Texture Trial   Mode of Presentation fed by clinician   Volume Presented 1 tbsp   Oral Phase impaired mastication;residue in oral cavity   Pharyngeal Phase impaired;no awareness of problems   Diagnostic Statement Pt with insufficient mastication, bolus removed from oral cavity by SLP due to no attempts at mastication.   Esophageal Phase of Swallow   Patient reports or presents with symptoms of esophageal dysphagia No   Swallowing Recommendations   Diet Consistency Recommendations thin liquids (level 0);pureed (level 4)   Supervision Level for Intake 1:1 supervision needed   Mode of Delivery Recommendations bolus size, small;food moistened;slow rate of intake   Swallowing Maneuver Recommendations alternate food and liquid intake;extra swallow   Monitoring/Assistance Required (Eating/Swallowing) stop eating activities when fatigue is present;cue for finger/lingual sweep if oral pocketing present;check mouth frequently for oral residue/pocketing;optimize oral intake to minimize need for tube feeding;monitor for cough or change in vocal quality with intake   Recommended Feeding/Eating Techniques (Swallow Eval) maintain upright sitting position for eating;maintain upright posture during/after eating for 30 minutes;minimize distractions during oral intake;provide assist with feeding;provide oral hygiene prior to intake;provide 6 smaller meals throughout day;set-up and prepare tray   Medication Administration Recommendations, Swallowing (SLP) crush pills (if able) and serve in puree   Instrumental Assessment Recommendations instrumental evaluation not recommended at  this time   General Therapy Interventions   Planned Therapy Interventions Dysphagia Treatment   Dysphagia treatment Modified diet education;Compensatory strategies for swallowing;Instruction of safe swallow strategies   Clinical Impression   Criteria for Skilled Therapeutic Interventions Met (SLP Eval) Yes, treatment indicated   SLP Diagnosis moderate oropharyngeal dysphagia   Risks & Benefits of therapy have been explained evaluation/treatment results reviewed;care plan/treatment goals reviewed;risks/benefits reviewed;current/potential barriers reviewed;participants voiced agreement with care plan;participants included;patient   Clinical Impression Comments Clinical swallow eval completed per MD orders. Pt presents with moderate oropharyngeal dysphagia in the setting of reduced ELIZABETH/poor oral awareness. Pt did not follow directions for oral mech exam. No vocalizations noted despite max encouragement. Pt tolerated thin liquids and pureed textures with no overt s/sx of aspiration, however significant bolus holding noted which required max verbal cues to swallow. Regular solid textures resulted in insufficient mastication, bolus removed from oral cavity by SLP due to no attempts at mastication. Attempted whole pill in puree with RN present, which also was removed from oral cavity due to no attempt to swallow. Recommend pureed diet (4) and thin liquids (0) with 1:1 supervision. Please crush pills (if able) and serve in puree. Caregivers to assist pt with upright positioning for all PO, small sips/bites, and cue/verify each swallow response. Please monitor for pocketing. ST to continue to follow to assess diet tolerance and advancement as appropriate. Pt may require ST follow-up at discharge.   SLP Discharge Recommendation home with assist   SLP Rationale for DC Rec pt may require ongoing diet modifications at discharge   SLP Brief overview of current status  Recommend pureed diet (4) and thin liquids (0) with 1:1  "supervision. Please crush pills (if able) and serve in puree. Caregivers to assist pt with upright positioning for all PO, small sips/bites, and cue/verify each swallow response. Please monitor for pocketing.   Total Session Time   Total Session Time (sum of timed and untimed services) 13             [facility]    Stroke Progress Note    Interval Events  No events overnight.  Patient was able to speak a few words today, but was mostly quiet.  Following commands.    HPI Summary  \"Isabell Matthews is a 62 year old female with a history of epilepsy, T2DM, recurrent UTI, CKD, vascular vs Alzheimer's dementia, and previous stroke who presents for evaluation of seizure like activity. History is limited to chart review, as attempts to reach daughter were unsuccessful. The patient was reportedly at home with her daughter today when she had an episode of seizure like activity. No description of the event is available, but per EMS on arrival the patient was at her baseline. The patient was brought in for evaluation and initially complained of a mild HA. She was found to have a UTI and was treated with ceftriaxone. She underwent a MRI brain and required Haldol 2 mg for procedural sedation. This MRI was notable for multifocal stroke of right paramedian chuck and punctate right MCA territory stroke. The patient reportedly was not complaining of any new symptoms.     On evaluation, the patient is not answering any questions with minimal attempts to speak despite repeated prompting. She is able to write her name and nod and shake her head to some questions and does indicate that she has a history of seizure and shrugs when asked if had a seizure today. Has had a history of non-compliance in the past but unable to assess compliance given limited history.\"    On 5/21, further history was obtained by asking the daughter. In terms of her baseline, it seems that patient can use a walker to " ambulate short distances from 1 room to the other, but needs a wheelchair if it is longer.  She is able to hold a conversation, but has slowed thought process and replies.    Stroke Evaluation Summarized    MRI/Head CT MRI Brain 5/20  IMPRESSION:  1. Acute infarcts within the right paramedian chuck and right  parasagittal frontal lobe.  2. No abnormal signal within the medial temporal lobes. Moderate  temporal predominant volume loss and chronic small vessel ischemic  disease.  3. Contrast portion of the exam was unable to be completed due to  patient intolerance.       Intracranial Vasculature Head CTA demonstrates focal severe stenosis at the right M1/M2  junction. Mild stenosis at the left M1 division M2 junction.   Cervical Vasculature Neck CTA demonstrates no stenosis of the major cervical arteries.     Echocardiogram Left ventricular size, wall motion and function are normal. The ejection  fraction is 55-60%.  Global right ventricular function is normal.  Small circumferential pericardial effusion is present without any hemodynamic  significance.  Chamber compression is not present; there is no evidence for tamponade.   EKG/Telemetry Sinus rhythm   Other Testing Not Applicable     LDL  5/20/2023: 71 mg/dL   A1C  3/29/2023: 11.2 %  5/20/2023: 10.8 %   Troponin 5/20/2023: 36 ng/L; 0.02 ng/mL       Impression    62 year old female with a history of epilepsy, T2DM, recurrent UTI, CKD, vascular vs Alzheimer's dementia, and previous stroke who presents for evaluation of encephalopathy.  Initial EMS report was for concerns for seizure-like activity, however, on confirmation with daughter, daughter says that she called EMS because patient was noted to be more confused since Tuesday, and especially yesterday was not really able to use her walker, and so daughter was worried that she might have a UTI that has required hospitalizations in the past.Imaging with multifocal stroke in 2 vascular territories, and the right  centrum semiovale and right chuck.      On chart review, it also appears that she had a left basal ganglia hemorrhagic infarct 2 years ago. At this stage, given that the strokes are in two different territories etiology is ESUS. Will admit the patient to complete stroke workup and identify appropriate treatment regimen.     In terms of her baseline, it seems that patient can use a walker to ambulate short distances from 1 room to the other, but needs a wheelchair if it is longer.  She is able to hold a conversation, but has slowed thought process and replies.  She needs help with her ADLs such as brushing, clothing, bathing, and cannot perform any IADLs.  In terms of residual deficits, she has swallowing difficulties, and possibly increased tone from her previous hemorrhagic stroke.     Plan  #Multifocal acute stroke ()  #History of stroke  - Neurochecks Q 4 hours  - Permissive HTN; labetalol PRN for SBP > 220  - Avoid hypotonic IV fluids  - Daily aspirin 81 mg for secondary stroke prevention  - Plavix (clopidogrel) 300 mg PO loading dose x 1 5/20  - Plavix (clopidogrel) 75 mg PO Daily  -LDL 71, A1c 10.8.  - Statin: PTA atrovastatin 40 mg, consider increasing to 80 mg.  - TTE with Bubble Study  - Telemetry, EKG  - Bedside Glucose Monitoring  - Nutrition: Regular diet, passed swallow  - A1c, Lipid Panel, Troponin x 3  - PT/OT/SLP  - Stroke Education  - Depression Screen  - Apnea Screen  - Euthermia, Euglycemia      #Seizure like activity  #History of epilepsy  Med rec says that she should be on Keppra 500 mg twice daily, but on asking daughter, daughter says that she is not on this medication.  Per chart review, she had 1 episode of left forced gaze deviation, with stiffness/rigidity in December 2022, for which she was admitted to general neurology, and started on Keppra.  24-hour EEG at that time demonstrated no electrographic seizures or epileptiform discharges.  Per daughter, this is the only seizure-like episode  she had, and current admission is not for seizure-like activity.  - Start Keppra 500 mg BID  - Keppra level  - Seizure precautions     #UTI  #Hx of recurrent UTIs  Has had 3-4 UTIs in the past year per daughter.  UA with pyuria.  Clinically somnolent, which could be related to the stroke, but UTI can also cause encephalopathy, so we will treat with antibiotics.  - Ceftriaxone 1g Qday  -Waiting for cultures.     #T2DM  -Per chart review, it appears that she has brittle diabetes.  She has had multiple episodes in the hospital in the past, but her glucose would drop down to 15, and she would become unresponsive.  - MDSSI  -Glucose checks  -D50 50 mL as needed ordered, can be given if patient's blood glucose is low/she is unresponsive.     #CKD  Similar GFR from 3/29/23 (35->33), less likely an EVON.   - Daily BMP     #Dementia  -  2/2 Vascular vs Alzheimer's     Patient Follow-up    - in 4-6 weeks with general neurology ([phone])    Prophylaxis            For VTE Prevention:  - enoxaparin     For Acid Suppression:  - GI prophylaxis is not indicated     Code Status  Full Code, per previous code statuses, will need to be readdessed in AM     During initial physical assessment, the plan of care was discussed and developed with patient.  Plan of care includes: the above.     Patient was admitted via [facility] ED ([place])     The patient will be admitted to the stroke team.   ______________________________________________________    Clinically Significant Risk Factors Present on Admission                # Drug Induced Platelet Defect: home medication list includes an antiplatelet medication   # Hypertension: Noted on problem list   # Dementia: noted on problem list          # CKD, Stage 3b (GFR 30-44): Will monitor and treat as appropriate  - last Cr =  1.48 mg/dL (Ref range: 0.51 - 0.95 mg/dL)  - last GFR = 39 mL/min/1.73m2 (Ref range: >60 mL/min/1.73m2)       Medications   Scheduled Meds   aspirin  81  mg Oral or Feeding Tube Daily    atorvastatin  40 mg Oral or Feeding Tube Daily    carvedilol  12.5 mg Oral or Feeding Tube BID w/meals    cefTRIAXone  1 g Intravenous Q24H    clopidogrel  75 mg Oral or NG Tube Daily    cyanocobalamin  1,000 mcg Oral or Feeding Tube Daily    doxazosin  1 mg Oral At Bedtime    DULoxetine  20 mg Oral or Feeding Tube Daily    enoxaparin ANTICOAGULANT  40 mg Subcutaneous Q24H    insulin aspart  1-7 Units Subcutaneous TID AC    insulin aspart  1-5 Units Subcutaneous At Bedtime    levETIRAcetam  500 mg Oral or Feeding Tube BID    levothyroxine  75 mcg Oral or Feeding Tube Daily    sodium chloride (PF)  3 mL Intracatheter Q8H    Vitamin D3  25 mcg Oral or Feeding Tube Daily       Infusion Meds   - MEDICATION INSTRUCTIONS -         PRN Meds  acetaminophen, glucose **OR** dextrose **OR** glucagon, labetalol **OR** hydrALAZINE, lidocaine 4%, lidocaine (buffered or not buffered), - MEDICATION INSTRUCTIONS -, sodium chloride (PF)      PHYSICAL EXAMINATION  Temp:  [98.1  F (36.7  C)-99  F (37.2  C)] 99  F (37.2  C)  Pulse:  [72-79] 77  Resp:  [11-18] 13  BP: (143-158)/(72-84) 158/80  SpO2:  [94 %-100 %] 97 %      General: Awake and alert in NAD   HEENT: Normocephalic, atraumatic, no epistaxis, no oral lesions  Resp: Breathing comfortably on room air  CV: Extremities warm and well perfused  Abdomen: +BS, Soft, non-distended, non-tender  Extremities: Warm and well perfused, peripheral pulses present, no edema  Skin: Not jaundiced, no rash, no ecchymoses  Psych: Normal mood and affect     Neuro:  Mental status: Awake, alert, attentive; minimal speech but is able to slowly respond yes and no questions which is an improvement from yesterday's exam. Is able to write her name and nod and shake head to simple questions. Follows all simple commands.  Cranial nerves: Visual fields intact, Eyes conjugate, EOMI w/ normal and smooth pursuit, face expression symmetric, hearing intact to conversation, shoulder  shrug strong, does not open mouth or stick out tongue to command  Motor: Tone normal.  At least antigravity in all extremities without any drift.  No abnormal movements noted. Pronator drift absent.   Reflexes: No clonus. Hyperreflexic on patellars (3+).   Sensory: Intact to light touch in all 4 extremities  Coordination: FNF intact bilaterally with no dysmetria; Normal heel-shin test bilaterally with no dysmetria noted  Gait: Deferred    Stroke Scales    NIHSS  1a. Level of Consciousness 0-->Alert, keenly responsive   1b. LOC Questions 2-->Answers neither question correctly   1c. LOC Commands 0-->Performs both tasks correctly   2.   Best Gaze 0-->Normal   3.   Visual 0-->No visual loss   4.   Facial Palsy 0-->Normal symmetrical movements   5a. Motor Arm, Left 0-->No drift, limb holds 90 (or 45) degrees for full 10 secs   5b. Motor Arm, Right 0-->No drift, limb holds 90 (or 45) degrees for full 10 secs   6a. Motor Leg, Left 0-->No drift, leg holds 30 degree position for full 5 secs   6b. Motor Leg, right 0-->No drift, leg holds 30 degree position for full 5 secs   7.   Limb Ataxia 0-->Absent   8.   Sensory 0-->Normal, no sensory loss   9.   Best Language 2-->Severe aphasia, all communication is through fragmentary expression, great need for inference, questioning, and guessing by the listener. Range of information that can be exchanged is limited, listener carries burden of. . . (see row details)   10. Dysarthria 0-->Normal   11. Extinction and Inattention  0-->No abnormality   Total 4 (05/20/23 0284)       Modified Overton Score (Pre-morbid)  3-Moderate disability; requiring some help, but able to walk without assistance    Imaging  I personally reviewed all imaging; relevant findings per HPI.     Lab Results Data   CBC  Recent Labs   Lab 05/21/23  0627 05/20/23  1312   WBC 7.3 8.2   RBC 3.77* 3.79*   HGB 10.7* 10.9*   HCT 35.0 34.9*    294     Basic Metabolic Panel    Recent Labs   Lab 05/21/23  0819  "05/21/23  0627 05/21/23  0317 05/20/23 1952 05/20/23  1312   NA  --  139  --   --  142   POTASSIUM  --  3.9  --   --  4.3   CHLORIDE  --  105  --   --  105   CO2  --  21*  --   --  25   BUN  --  29.3*  --   --  38.2*   CR  --  1.48*  --   --  1.71*  1.71*   * 139* 77   < > 149*   VERONICA  --  8.8  --   --  9.5    < > = values in this interval not displayed.     Liver Panel  Recent Labs   Lab 05/20/23  1312   PROTTOTAL 6.9   ALBUMIN 3.7   BILITOTAL 0.3   ALKPHOS 189*   AST 17   ALT 17     INR    Recent Labs   Lab Test 12/10/22  1133 12/10/22  1132 05/03/22  0229   INR 1.00 1.1* 1.3*      Lipid Profile    Recent Labs   Lab Test 05/20/23  1312 03/29/23  1158 04/06/22  0014   CHOL 136 141 99   HDL 35* 48* 34*   LDL 71 72 39   TRIG 152* 105 129     A1C    Recent Labs   Lab Test 05/20/23  1312 03/29/23  1158 10/31/22  1721   A1C 10.8* 11.2* 10.3*     Troponin    Recent Labs   Lab 05/20/23  1924 05/20/23  1312   CTROPT  --  36*   TROPONINI 0.02  --          Data       Attestation signed by Elisa Soto MD at 5/21/2023  3:01 PM:  Attending Physician Addendum    Patient was seen and examined on 5/21/2023 with the resident on the vascular neurology service, Dr Taveras. Agree with the note above including the history, examination, assessment, and plan.       Elisa Soto MD, Msc, FAHA, FAAN   of Neurology  [facility]     05/21/2023 3:01 PM  To page me or covering stroke neurology team member, click here: AMCOM  Choose \"On Call\" tab at top, then search dropdown box for \"Neurology Adult\" & press Enter, look for Neuro ICU/Stroke      Evaluation completed in ED.     Angelique Aguirre, PT. Pager [phone]      05/21/23 1450   Appointment Info   Signing Clinician's Name / Credentials (PT) Angelique Aguirre, PT, DPT   Rehab Comments (PT) Co-eval with OT, increased risk for falls   Living Environment   People in Home child(tiffany), adult   Current Living Arrangements apartment   Home Accessibility no " "concerns   Transportation Anticipated family or friend will provide   Living Environment Comments Pt is questionable historian. Per chart review, pt lives in an apartment with her daughter who provides PCA services 40 hours/wk for the pt. Pt receives an additional 30 hours of PCA help. Family provides transportation.   Self-Care   Usual Activity Tolerance moderate   Current Activity Tolerance fair   Regular Exercise No   Equipment Currently Used at Home walker, standard;wheelchair, manual;shower chair   Fall history within last six months yes   Number of times patient has fallen within last six months 1   Activity/Exercise/Self-Care Comment Pt reports she is Juliet with ADL's and mobility using FWW within home and w/c in community. Requires A with bathing. However, chart reports pt receives assistance with all ADL's. Reported 1 fall.   General Information   Onset of Illness/Injury or Date of Surgery 05/20/23   Referring Physician Zoltan Albert MD   Patient/Family Therapy Goals Statement (PT) none stated   Pertinent History of Current Problem (include personal factors and/or comorbidities that impact the POC) per EMR: \"\"Isabell Matthews is a 62 year old female with a history of epilepsy, T2DM, recurrent UTI, CKD, vascular vs Alzheimer's dementia, and previous stroke who presents for evaluation of seizure like activity. History is limited to chart review, as attempts to reach daughter were unsuccessful. The patient was reportedly at home with her daughter today when she had an episode of seizure like activity. No description of the event is available, but per EMS on arrival the patient was at her baseline. The patient was brought in for evaluation and initially complained of a mild HA. She was found to have a UTI and was treated with ceftriaxone. She underwent a MRI brain and required Haldol 2 mg for procedural sedation. This MRI was notable for multifocal stroke of right paramedian chuck and punctate right MCA territory " "stroke. The patient reportedly was not complaining of any new symptoms.\"   Existing Precautions/Restrictions fall   Weight-Bearing Status - LUE full weight-bearing   Weight-Bearing Status - RUE full weight-bearing   Weight-Bearing Status - LLE full weight-bearing   Weight-Bearing Status - RLE full weight-bearing   Cognition   Orientation Status (Cognition) oriented to;person   Pain Assessment   Patient Currently in Pain No   Integumentary/Edema   Integumentary/Edema no deficits were identifed   Posture    Posture Forward head position;Protracted shoulders   Range of Motion (ROM)   Range of Motion ROM is WFL   Strength (Manual Muscle Testing)   Strength (Manual Muscle Testing) Deficits observed during functional mobility   Strength Comments grossly 3/5 in B LE; generalized weakness   Bed Mobility   Comment, (Bed Mobility) supine > sit with min A for trunk   Transfers   Comment, (Transfers) sit > stand with min A x2 and HHA   Gait/Stairs (Locomotion)   Comment, (Gait/Stairs) gait impaired   Balance   Balance other (describe)   Balance Comments fair(-)/fair sitting balance; poor/fair(-) standing balance   Sensory Examination   Sensory Perception other (describe)   Sensory Perception Comments unable to report   Coordination   Coordination no deficits were identified   Muscle Tone   Muscle Tone no deficits were identified   Clinical Impression   Criteria for Skilled Therapeutic Intervention Yes, treatment indicated   PT Diagnosis (PT) impaired functional mobility; increased risk for falls   Influenced by the following impairments decreased balance, strength, and endurance   Functional limitations due to impairments difficulty with functional mobility   Clinical Presentation (PT Evaluation Complexity) Stable/Uncomplicated   Clinical Presentation Rationale per clinical judgment   Clinical Decision Making (Complexity) low complexity   Planned Therapy Interventions (PT) balance training;bed mobility training;gait " training;home exercise program;motor coordination training;neuromuscular re-education;patient/family education;postural re-education;ROM (range of motion);stair training;strengthening;stretching;transfer training;progressive activity/exercise;risk factor education;home program guidelines   Anticipated Equipment Needs at Discharge (PT)   (tbd)   Risk & Benefits of therapy have been explained evaluation/treatment results reviewed;care plan/treatment goals reviewed;risks/benefits reviewed;current/potential barriers reviewed;participants voiced agreement with care plan;participants included;patient   PT Total Evaluation Time   PT Moses Low Complexity Minutes (38734) 10   Physical Therapy Goals   PT Frequency 6x/week   PT Predicted Duration/Target Date for Goal Attainment 06/04/23   PT: Bed Mobility Independent;Supine to/from sit;Rolling;Bridging  (with HOB flat)   PT: Transfers Modified independent;Sit to/from stand;Bed to/from chair  (with LRAD)   PT: Gait Modified independent;150 feet  (with LRAD)   PT Discharge Planning   PT Plan progress mobility as able, confirm home setup/assist available   PT Discharge Recommendation (DC Rec) home with assist;home with home care physical therapy   PT Rationale for DC Rec Pt is demonstrating functional mobility below baseline. Currently Ax1-2 for ADL's and mobility. Increased risk for falls. Once medically appropriate, anticipated to d/c home with assist from daughter and PCA for ADL's and mobility with FWW. Will benefit from HH PT to reduce risk for falls within her home and readmission. Will assess and update as appropriate.   PT Brief overview of current status Ax1 with FWW/HHA and gait belt for transfers; up to chair 3x/day; risk for falls (chair alarm)          05/21/23 4640   Appointment Info   Signing Clinician's Name / Credentials (OT) Leola Christian OTR/L   Living Environment   People in Home child(tiffany), adult   Current Living Arrangements apartment   Home Accessibility no  "concerns   Transportation Anticipated family or friend will provide   Living Environment Comments Pt presents to be inaccurate historian and majority of living arrangements obtained from previous encounters. Per chart, pt lives with adult daughter of whoom is pt PCA, authorized 40hrs per week. Per chart pt receives additional 30hrs of PCA services per week to assist with ADL/IADL as needed. tub/shower combination. Amb. with FWW and manual wc in community.   Self-Care   Usual Activity Tolerance moderate   Current Activity Tolerance fair   Regular Exercise No   Equipment Currently Used at Home walker, standard;wheelchair, manual;shower chair   Fall history within last six months   (did not report)   Activity/Exercise/Self-Care Comment Per chart review, pt receives mod. A with all ADL, however, pt reporting near IND dressing, toileting, bathing. Of note, chart states incontinence and RN staff confirms this IP stay.   Instrumental Activities of Daily Living (IADL)   IADL Comments Pt daughter completes   General Information   Onset of Illness/Injury or Date of Surgery 05/20/23   Referring Physician Zoltan Albert MD   Patient/Family Therapy Goal Statement (OT) did not state   Additional Occupational Profile Info/Pertinent History of Current Problem Per EMR, \"Isabell Matthews is a 62 year old female with a history of epilepsy, T2DM, recurrent UTI, CKD, vascular vs Alzheimer's dementia, and previous stroke who presents for evaluation of seizure like activity. History is limited to chart review, as attempts to reach daughter were unsuccessful. The patient was reportedly at home with her daughter today when she had an episode of seizure like activity. No description of the event is available, but per EMS on arrival the patient was at her baseline. The patient was brought in for evaluation and initially complained of a mild HA. She was found to have a UTI and was treated with ceftriaxone. She underwent a MRI brain and required " "Haldol 2 mg for procedural sedation. This MRI was notable for multifocal stroke of right paramedian chuck and punctate right MCA territory stroke. The patient reportedly was not complaining of any new symptoms.\"   Existing Precautions/Restrictions fall;seizures   Left Upper Extremity (Weight-bearing Status) full weight-bearing (FWB)   Right Upper Extremity (Weight-bearing Status) full weight-bearing (FWB)   Left Lower Extremity (Weight-bearing Status) full weight-bearing (FWB)   Right Lower Extremity (Weight-bearing Status) full weight-bearing (FWB)   Cognitive Status Examination   Orientation Status person;place   Cognitive Status Comments not oriented to date or time   Visual Perception   Visual Impairment/Limitations WFL   Sensory   Sensory Quick Adds sensation intact   Pain Assessment   Patient Currently in Pain No   Posture   Posture forward head position;protracted shoulders   Range of Motion Comprehensive   General Range of Motion no range of motion deficits identified   Strength Comprehensive (MMT)   Comment, General Manual Muscle Testing (MMT) Assessment generalized weakness and deconditioning   Bed Mobility   Comment (Bed Mobility) CGA-min. Ax1   Transfers   Transfer Comments min. Ax1-2   Balance   Balance Assessment standing balance: dynamic   Balance Comments demsontrating posterior lean and bilateral locking of knees without cues   Activities of Daily Living   BADL Assessment/Intervention bathing;upper body dressing;lower body dressing;grooming;toileting   Bathing Assessment/Intervention   [place] Level (Bathing) minimum assist (75% patient effort)   Comment, (Bathing) anticipate per clinical judgement   Assistive Devices (Bathing) shower chair   Upper Body Dressing Assessment/Training   Comment, (Upper Body Dressing) anticipate per clinical judgement   [place] Level (Upper Body Dressing) minimum assist (75% patient effort)   Lower Body Dressing Assessment/Training   Comment, (Lower Body " Dressing) anticipate per clinical judgement   [place] Level (Lower Body Dressing) contact guard assist   Grooming Assessment/Training   [place] Level (Grooming) contact guard assist   Comment, (Grooming) anticipate per clinical judgement   Toileting   Comment, (Toileting) anticipate per clinical judgement   [place] Level (Toileting) moderate assist (50% patient effort)   Clinical Impression   Criteria for Skilled Therapeutic Interventions Met (OT) Yes, treatment indicated   OT Diagnosis decreased ADL IND   OT Problem List-Impairments impacting ADL problems related to;activity tolerance impaired;cognition;communication   Assessment of Occupational Performance 3-5 Performance Deficits   Identified Performance Deficits dressing, bathing, toileting, grooming/ hygiene   Planned Therapy Interventions (OT) ADL retraining;cognition;transfer training;home program guidelines;progressive activity/exercise   Clinical Decision Making Complexity (OT) low complexity   Anticipated Equipment Needs Upon Discharge (OT)   (TBD)   Risk & Benefits of therapy have been explained evaluation/treatment results reviewed;care plan/treatment goals reviewed;participants included;patient  (needs review/reinforcement)   Clinical Impression Comments Pt will benefit from skilled IP OT to maximize safety and independence in ADL/transfer participation prior to return home   OT Total Evaluation Time   OT Moses Low Complexity Minutes (38436) 12   OT Goals   Therapy Frequency (OT) 6 times/wk   OT Predicted Duration/Target Date for Goal Attainment 05/26/23   OT Goals Hygiene/Grooming;Upper Body Dressing;Lower Body Dressing;Transfers;Toilet Transfer/Toileting;Cognition   OT: Hygiene/Grooming supervision/stand-by assist   OT: Upper Body Dressing Supervision/stand-by assist   OT: Lower Body Dressing Supervision/stand-by assist   OT: Transfer Supervision/stand-by assist   OT: Toilet Transfer/Toileting Minimal assist   OT: Cognitive  Patient/caregiver will verbalize understanding of cognitive assessment results/recommendations as needed for safe discharge planning   Therapeutic Activities   Therapeutic Activity Minutes (28352) 20   Symptoms noted during/after treatment fatigue;dizziness   Treatment Detail/Skilled Intervention OT: Post-evaluation and upon return to room in PM once BP stabilized (still elevated SBP >150 but within parameters), pt agreeable to OOB activity. Facilitated increased IND in funcitonal transfers and dressing this date to return to baseline. Pt initially completed bed mobility supine >sit with CGA-min. Ax2 and progressed to CGA x1 upon return sit >supine at end of session (intermittent min. A at BLEs). Seated EOB pt initially required min. A and progressed to SBA-CGA to complete sock don/doffing with set up A. and min. vcs. Engaged pt in x3 STS with min. Ax2 progressing to min. Ax1 with continued repetition and ocmmand following. Pt endorsing initial dizziness and resolves with additional time to adjust body in space. When standing, pt demosntrating heavy posterior lean and required verbal, tactile, and gestural cues to shift weight over toes and place flat feet onto ground. Demo'd marching in place with CGA-min. Ax1 and took 3-4 lateral steps toward HOB prior to return. BP wit hsignificant drop from SBP 150s to 110s and pt returned to bed. all other vitals within therapeutic range. Left with call light in reach and TV turned on. Immediate needs met   OT Discharge Planning   OT Plan functional transfers, seated vs. standing ADL   OT Discharge Recommendation (DC Rec) home with home care occupational therapy   OT Rationale for DC Rec Team with difficulty contacting daughter to obtain pt baseline earlier this date. will need to confrim ADL baseline, however, with chart review pt appears to be near ADL baseline Ax1-2 and anticipate safe return with PCA assist if this is the case and pending physical therapy recommendations  with ambulation. Will benefit from ongoing IP OT/PT to progress to baseline Ax1.   OT Brief overview of current status CGA-min. Ax1-2, gait belt at all times with posterior lean in standing. Verbal cues. Holding food/water in mouth despite ability to swallow. Seated BADL with set up/supervision at minimum   Total Session Time   Timed Code Treatment Minutes 20   Total Session Time (sum of timed and untimed services) 32       Pt. Transferred to  via bed with transport staff. Report given to bedside nurse (Kim). All pt belongings sent with patient                                        Initial Home Care Referrals     CHW was tasked with sending initial home care referrals for PT/OT. Per OT notes, pt receives 40 hours of PCA services with daughter being her PCA. Patient is discharging 5/22 or 5/23.     Pending    [facility] Location  [phone]  5/22: CHW sent referral via [employer].     [facility]  [phone]  5/22: CHW sent referral via [employer].     [facility]  [phone]  5/22: CHW sent referral via [employer].     [facility]  [phone]  5/22: CHW sent referral via Epic.     [facility]  [phone]  5/22: CHW sent referral via [employer].     [facility]  [phone]  5/22: CHW sent referral via Epic.     Declined    [facility] - not accepting new referrals  [facility] - capped w/Medicare   [facility] - declined due insurance   [facility] - declined due to capacity   [facility] - not taking [facility] for PT.  [facility] - unable to staff   [facility] - PT/OT fully booked  [facility] - unable to staff  [facility] -not accepting new referrals  [facility] - no new referrals   [facility] - unable to accept insurance  [facility] - facility  "full  [facility] - insurance out of network  [facility] - unable to reach by phone or find in [facility] - at capacity in [facility] - declined due to insurance          [facility]    Stroke Progress Note    Interval Events  Overnight, patient was noted to have a high blood glucose of 500, she was given her home glargine, and glucose subsequently went down and has been in the 200s all day.    Her mentation has improved today.  Patient is able to respond, after a delay, to most of the questions.    HPI Summary  \"Isabell Matthews is a 62 year old female with a history of epilepsy, T2DM, recurrent UTI, CKD, vascular vs Alzheimer's dementia, and previous stroke who presents for evaluation of seizure like activity. History is limited to chart review, as attempts to reach daughter were unsuccessful. The patient was reportedly at home with her daughter today when she had an episode of seizure like activity. No description of the event is available, but per EMS on arrival the patient was at her baseline. The patient was brought in for evaluation and initially complained of a mild HA. She was found to have a UTI and was treated with ceftriaxone. She underwent a MRI brain and required Haldol 2 mg for procedural sedation. This MRI was notable for multifocal stroke of right paramedian chuck and punctate right MCA territory stroke. The patient reportedly was not complaining of any new symptoms.     On evaluation, the patient is not answering any questions with minimal attempts to speak despite repeated prompting. She is able to write her name and nod and shake her head to some questions and does indicate that she has a history of seizure and shrugs when asked if had a seizure today. Has had a history of non-compliance in the past but unable to assess compliance given limited history.\"    On 5/21, further history was " obtained by asking the daughter. In terms of her baseline, it seems that patient can use a walker to ambulate short distances from 1 room to the other, but needs a wheelchair if it is longer.  She is able to hold a conversation, but has slowed thought process and replies.    Stroke Evaluation Summarized    MRI/Head CT MRI Brain 5/20  IMPRESSION:  1. Acute infarcts within the right paramedian chuck and right  parasagittal frontal lobe.  2. No abnormal signal within the medial temporal lobes. Moderate  temporal predominant volume loss and chronic small vessel ischemic  disease.  3. Contrast portion of the exam was unable to be completed due to  patient intolerance.       Intracranial Vasculature Head CTA demonstrates focal severe stenosis at the right M1/M2  junction. Mild stenosis at the left M1 division M2 junction.   Cervical Vasculature Neck CTA demonstrates no stenosis of the major cervical arteries.     Echocardiogram Left ventricular size, wall motion and function are normal. The ejection  fraction is 55-60%.  Global right ventricular function is normal.  Small circumferential pericardial effusion is present without any hemodynamic  significance.  Chamber compression is not present; there is no evidence for tamponade.   EKG/Telemetry Sinus rhythm   Other Testing Not Applicable     LDL  5/20/2023: 71 mg/dL   A1C  3/29/2023: 11.2 %  5/20/2023: 10.8 %   Troponin 5/20/2023: 0.02 ng/mL       Impression    62 year old female with a history of epilepsy, T2DM, recurrent UTI, CKD, vascular vs Alzheimer's dementia, and previous stroke who presents for evaluation of encephalopathy.  Initial EMS report was for concerns for seizure-like activity, however, on confirmation with daughter, daughter says that she called EMS because patient was noted to be more confused since Tuesday, and especially yesterday was not really able to use her walker, and so daughter was worried that she might have a UTI that has required  hospitalizations in the past.Imaging with multifocal stroke in 2 vascular territories, and the right centrum semiovale and right chuck.      On chart review, it also appears that she had a left basal ganglia hemorrhagic infarct 2 years ago. 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  On 5/23, it appears that she is at baseline.  She is able to perform her ADLs in her hospital with standby assist, which daughter had mentioned is her baseline.  Her work-up here is complete, although, to rule out cardioembolic etiology (before confirming it as ESUS), we would recommend a 30-day heart monitor, as well as a cardiac CTA as outpatient (this is more sensitive than a MARY ALICE, and we are currently hesitant because of her EVON).  Daughter plans to come here in the evening, and reassess if she feels comfortable taking Isabell home.  If she feels as such, then we can discharge her home with appropriate PT/OT follow-up, otherwise, we can keep her tonight, and discharge possibly tomorrow.     Plan  #Multifocal acute stroke (right centrum semiovale, right chuck)  #History of stroke  - Neurochecks Q 4 hours  - Daily aspirin 81 mg for secondary stroke prevention  - Plavix (clopidogrel) 300 mg PO loading dose x 1 5/20  - Plavix (clopidogrel) 75 mg PO Daily until 6/12.  -LDL 71, A1c 10.8.  - Statin: PTA atrovastatin 40 mg, consider increasing to 80 mg.  - TTE with Bubble Study-did not show any evidence of thrombus, wall motion abnormalities, atrial look normal.  -Plan for 30-day cardiac monitor on discharge, as well as outpatient cardiac CTA to rule out cardioembolic source.  -Other routine stroke orders:  - Telemetry, EKG  - Bedside Glucose Monitoring  - Nutrition: Regular diet, passed swallow  - A1c, Lipid Panel, Troponin x 3 (initially elevated, but down trended)  - PT/OT/SLP  - Stroke Education  - Depression Screen  - Apnea Screen  - Euthermia, Euglycemia      #Possible seizure like  activity in the past  #History of possible epilepsy  Med rec says that she should be on Keppra 500 mg twice daily, but on asking daughter, daughter says that she is not on this medication.  Per chart review, she had 1 episode of left forced gaze deviation, with stiffness/rigidity in December 2022, for which she was admitted to general neurology, and started on Keppra.  24-hour EEG at that time demonstrated no electrographic seizures or epileptiform discharges.  Per daughter, this is the only seizure-like episode she had, and current admission is not for seizure-like activity.  - Start Keppra 500 mg BID  - Keppra level  - Seizure precautions     #UTI  #Hx of recurrent UTIs  Has had 3-4 UTIs in the past year per daughter.  UA with pyuria.  Clinically somnolent, which could be related to the stroke, but UTI can also cause encephalopathy, so we will treat with antibiotics.  - Ceftriaxone 1g Qday, received 2 doses.  From 5/23, switching to Bactrim p.o. twice daily for 5 days.  -Waiting for cultures-so far positive for gram-positive bacilli, sensitivities have not come back.     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  She has had multiple episodes in the hospital in the past, but her glucose would drop down to 15, and she would become unresponsive.  -Endocrinology consulted, appreciate recs (5/22).    -Lantus 22 units-- decreased to 18 units given CKD (worsening egfr) and adding of CHO coverage-- with treated infection needs will likely decrease- patient known to have labile BG with severe hypoglycemia              -Novolog 1:18 CHO coverage with meals/snacks/supplements               -Novolog high sliding scale AC/HS-- decreased to medium-- changed future to correct when greater than 170 during day given history of severe low BG               -BG monitoring TID AC, HS, 0200              -hypoglycemia protocol              -recommend carb consistent diet with carb counting  protocol              -diabetes education needs will be assessed closer to discharge  -On discharge, would need follow-up with [facility] endocrinology service.     #EVON on CKD  Baseline creatinine of 1.3-1.4.  Creatinine today 1.7, with high BUN: Creatinine ratio, likely prerenal especially given patient was not drinking much in the ED.  -S/p 1 L fluids overnight.  -Encouraged p.o. intake.  - Daily BMP     #Dementia  -  2/2 Vascular vs Alzheimer's.  On her 2021 MRI, no signs suggestive of CVA.  She did have microhemorrhages on SWI, but all of these were deep.    Patient Follow-up    - in 4-6 weeks with general neurology ([phone])    Prophylaxis            For VTE Prevention:  - enoxaparin     For Acid Suppression:  - GI prophylaxis is not indicated     Code Status  Full Code     During initial physical assessment, the plan of care was discussed and developed with patient.  Plan of care includes: the above.     Patient was admitted via [facility] ([place])     Patient was seen and discussed with attending, Dr. Macias.    TYLER Nur  PGY1 Resident  Stroke [facility] *[id number]  ______________________________________________________    Clinically Significant Risk Factors                  # Hypertension: Noted on problem list     # Dementia: noted on problem list           # CKD, Stage 3b (GFR 30-44): Will monitor and treat as appropriate  - last Cr =  1.48 mg/dL (Ref range: 0.51 - 0.95 mg/dL)  - last GFR = 39 mL/min/1.73m2 (Ref range: >60 mL/min/1.73m2)       Medications   Scheduled Meds   aspirin  81 mg Oral or Feeding Tube Daily    atorvastatin  40 mg Oral or Feeding Tube Daily    carvedilol  12.5 mg Oral or Feeding Tube BID w/meals    cefTRIAXone  1 g Intravenous Q24H    clopidogrel  75 mg Oral or NG Tube Daily    cyanocobalamin  1,000 mcg Oral or Feeding Tube Daily    doxazosin  1 mg Oral At Bedtime    DULoxetine  20 mg Oral or Feeding Tube Daily    heparin ANTICOAGULANT  5,000 Units  Subcutaneous Q12H    insulin aspart   Subcutaneous TID w/meals    insulin aspart  1-5 Units Subcutaneous At Bedtime    insulin aspart  1-6 Units Subcutaneous TID AC    insulin glargine  18 Units Subcutaneous At Bedtime    levETIRAcetam  500 mg Oral or Feeding Tube BID    levothyroxine  75 mcg Oral or Feeding Tube Daily    sodium chloride (PF)  3 mL Intracatheter Q8H    Vitamin D3  25 mcg Oral or Feeding Tube Daily       Infusion Meds   - MEDICATION INSTRUCTIONS -         PRN Meds  acetaminophen, glucose **OR** dextrose **OR** glucagon, labetalol **OR** hydrALAZINE, insulin aspart, lidocaine 4%, lidocaine (buffered or not buffered), - MEDICATION INSTRUCTIONS -, sodium chloride (PF)      PHYSICAL EXAMINATION  Temp:  [97.3  F (36.3  C)-99.2  F (37.3  C)] 97.7  F (36.5  C)  Pulse:  [74-91] 85  Resp:  [16-18] 18  BP: (148-183)/(69-82) 150/69  SpO2:  [97 %-99 %] 99 %      General: Awake and alert in NAD   HEENT: Normocephalic, atraumatic, no epistaxis, no oral lesions  Resp: Breathing comfortably on room air  CV: Extremities warm and well perfused  Abdomen: +BS, Soft, non-distended, non-tender  Extremities: Warm and well perfused, peripheral pulses present, no edema  Skin: Not jaundiced, no rash, no ecchymoses  Psych: Normal mood and affect     Neuro:  Mental status: Awake, alert, attentive;  she is able to answer questions, but after a small delay.  On prompting, she is able to tell where she is, she can choose the month of May when offered options, she can tell her age.  She can follow simple commands as well as two-step commands.  Cranial nerves: Visual fields intact, Eyes conjugate, EOMI w/ normal and smooth pursuit, face expression symmetric, hearing intact to conversation, shoulder shrug strong, does not open mouth or stick out tongue to command  Motor: Tone normal.  At least antigravity in all extremities without any drift.  When we asked her to roll her index fingers around each other, the right finger was rolling  more than the left, suggestive of perhaps subtle left-sided hemiparesis.  No abnormal movements noted. Pronator drift absent.   Reflexes: No clonus. Hyperreflexic on patellars (3+).   Sensory: Intact to light touch in all 4 extremities  Coordination: FNF intact bilaterally with no dysmetria; Normal heel-shin test bilaterally with no dysmetria noted  Gait: Deferred    Stroke Scales    NIHSS  1a. Level of Consciousness 0-->Alert, keenly responsive   1b. LOC Questions 1-->Answers one question correctly   1c. LOC Commands 0-->Performs both tasks correctly   2.   Best Gaze 0-->Normal   3.   Visual 0-->No visual loss   4.   Facial Palsy 0-->Normal symmetrical movements   5a. Motor Arm, Left 0-->No drift, limb holds 90 (or 45) degrees for full 10 secs   5b. Motor Arm, Right 0-->No drift, limb holds 90 (or 45) degrees for full 10 secs   6a. Motor Leg, Left 0-->No drift, leg holds 30 degree position for full 5 secs   6b. Motor Leg, right 0-->No drift, leg holds 30 degree position for full 5 secs   7.   Limb Ataxia 0-->Absent   8.   Sensory 0-->Normal, no sensory loss   9.   Best Language 0-->No aphasia, normal   10. Dysarthria 1-->Mild-to-moderate dysarthria, patient slurs at least some words and, at worst, can be understood with some difficulty   11. Extinction and Inattention  0-->No abnormality   Total 2 (05/22/23 1200)       Modified [place] Score (Pre-morbid)  3-Moderate disability; requiring some help, but able to walk without assistance    Imaging  I personally reviewed all imaging; relevant findings per HPI.     Lab Results Data   CBC  Recent Labs   Lab 05/21/23  0627 05/20/23  1312   WBC 7.3 8.2   RBC 3.77* 3.79*   HGB 10.7* 10.9*   HCT 35.0 34.9*    294     Basic Metabolic Panel    Recent Labs   Lab 05/22/23  1711 05/22/23  1559 05/22/23  1253 05/22/23  0136 05/22/23  0007 05/21/23  0847 05/21/23  0627 05/20/23 1952 05/20/23  1312   NA  --   --   --   --  134*  --  139  --  142   POTASSIUM  --   --   --   --   4.4  --  3.9  --  4.3   CHLORIDE  --   --   --   --  102  --  105  --  105   CO2  --   --   --   --  19*  --  21*  --  25   BUN  --   --   --   --  38.3*  --  29.3*  --  38.2*   CR  --   --   --   --  1.75*  --  1.48*  --  1.71*  1.71*   * 243* 230*   < > 496*   < > 139*   < > 149*   VERONICA  --   --   --   --  8.4*  --  8.8  --  9.5    < > = values in this interval not displayed.     Liver Panel  Recent Labs   Lab 05/20/23  1312   PROTTOTAL 6.9   ALBUMIN 3.7   BILITOTAL 0.3   ALKPHOS 189*   AST 17   ALT 17     INR    Recent Labs   Lab Test 12/10/22  1133 12/10/22  1132 05/03/22  0229   INR 1.00 1.1* 1.3*      Lipid Profile    Recent Labs   Lab Test 05/20/23  1312 03/29/23  1158 04/06/22  0014   CHOL 136 141 99   HDL 35* 48* 34*   LDL 71 72 39   TRIG 152* 105 129     A1C    Recent Labs   Lab Test 05/20/23  1312 03/29/23  1158 10/31/22  1721   A1C 10.8* 11.2* 10.3*     Troponin    Recent Labs   Lab 05/20/23  1924 05/20/23  1312   CTROPT  --  36*   TROPONINI 0.02  --          Data                                  Initial Home Care Referrals      CHW was tasked with following up on home care referrals for PT/OT by Tianna DAVALOS RNCC. Per OT notes, pt receives 40 hours of PCA services with daughter being her PCA. Patient discharged 5/22.      Pending     [facility]  [phone]  5/22: CHW sent referral via [employer].   5/23: CHW spoke with Jocelyn who said they are checking insurance.      [facility]  [phone]  5/22: CHW sent referral via Epic.      Declined     [facility] - not accepting new referrals  [facility] - capped w/Medicare   [facility] - declined due insurance   [facility] - declined due to capacity   [facility] - not taking [facility] for PT.  [facility] - unable to staff   [facility] - PT/OT fully booked  [facility] - unable to staff  [facility] -not accepting new  referrals  [facility] - no new referrals   [facility] - unable to accept insurance  [facility] - facility full  [facility] - insurance out of network  [facility] - unable to reach by phone or find in [employer] [facility] - at capacity in area  [facility] - declined due to insurance  [facility] - out of insurance network  [facility] - No therapies available  [facility] - declined (no reason given)  [facility] - no therapies available    Again, thank you for allowing me to participate in the care of your patient.      Sincerely,    No name on file

## 2023-05-20 NOTE — H&P
[facility]    Stroke Admission Note    Chief Complaint  Seizure-like activity    RAMILA Matthews is a 62 year old female with a history of epilepsy, T2DM, recurrent UTI, CKD, vascular vs Alzheimer's dementia, and previous stroke who presents for evaluation of seizure like activity. History is limited to chart review, as attempts to reach daughter were unsuccessful. The patient was reportedly at home with her daughter today when she had an episode of seizure like activity. No description of the event is available, but per EMS on arrival the patient was at her baseline. The patient was brought in for evaluation and initially complained of a mild HA. She was found to have a UTI and was treated with ceftriaxone. She underwent a MRI brain and required Haldol 2 mg for procedural sedation. This MRI was notable for multifocal stroke of right paramedian chuck and punctate right MCA territory stroke. The patient reportedly was not complaining of any new symptoms.    On evaluation, the patient is not answering any questions with minimal attempts to speak despite repeated prompting. She is able to write her name and nod and shake her head to some questions and does indicate that she has a history of seizure and shrugs when asked if had a seizure today. Has had a history of non-compliance in the past but unable to assess compliance given limited history.    Intravenous Thrombolysis  Not given due to:   - unclear or unfavorable risk-benefit profile for extended window thrombolysis beyond the conventional 4.5 hour time window    Endovascular Treatment  Not initiated due to absence of proximal vessel occlusion    Impression    62 year old female with a history of epilepsy, T2DM, recurrent UTI, CKD, vascular vs Alzheimer's dementia, and previous stroke who presents for evaluation of seizure like activity. Exam with (volitional?) mutism without other focal findings. Imaging with  multifocal stroke in 2 vascular territories. At this stage, given that the strokes are in two different territories etiology is ESUS. Will admit the patient to complete stroke workup and identify appropriate treatment regimen. Unclear if the patient did have a seizure given the story. Will monitor for this for now.    Plan  #Multifocal acute stroke  #History of stroke  - Neurochecks Q 4 hours  - Permissive HTN; labetalol PRN for SBP > 220  - Avoid hypotonic IV fluids  - Daily aspirin 81 mg for secondary stroke prevention  - Plavix (clopidogrel) 300 mg PO loading dose x 1  - Plavix (clopidogrel) 75 mg PO Daily  - Statin: PTA atrovastatin 40 mg pending LDL  - TTE with Bubble Study  - Telemetry, EKG  - Bedside Glucose Monitoring  - Nutrition: Regular diet, passed swallow  - A1c, Lipid Panel, Troponin x 3  - PT/OT/SLP  - Stroke Education  - Depression Screen  - Apnea Screen  - Euthermia, Euglycemia      #Seizure like activity  #History of epilepsy  - Continue PTA Keppra 500 mg BID  - Keppra level  - Seizure precautions    #UTI  - Ceftriaxone 1g Qday    #T2DM  - MDSSI  - Glucose checks    #CKD  Similar GFR from 3/29/23 (35->33), less likely an EVON.   - Daily BMP    #Dementia    Prophylaxis            For VTE Prevention:  - enoxaparin    For Acid Suppression:  - GI prophylaxis is not indicated    Code Status  Full Code, per previous code statuses, will need to be readdessed in AM    During initial physical assessment, the plan of care was discussed and developed with patient.  Plan of care includes: the above.    Patient was admitted via [facility] ([place])    The patient will be admitted to the stroke team.     Zoltan Albert MD  PGY-3 Neurology Resident  Stroke [facility] *[id number]  ___________________________________________________    Nutrition: Regular diet, passed swallow  None    Clinically Significant Risk Factors Present on Admission                # Drug Induced Platelet Defect: home medication list  includes an antiplatelet medication   # Hypertension: Noted on problem list   # Dementia: noted on problem list          # CKD, Stage 3b (GFR 30-44): Will monitor and treat as appropriate  - last Cr =  1.71 mg/dL (Ref range: 0.51 - 0.95 mg/dL)  - last GFR = 33 mL/min/1.73m2 (Ref range: >60 mL/min/1.73m2)       Past Medical History   Past Medical History:   Diagnosis Date     Chronic pain      Coronary atherosclerosis 6/14/2016     Essential hypertension      Ex-cigarette smoker     quit 7/2018 over 35 years     Ex-cigarette smoker      Hyperlipidemia      Hypothyroid      Seizures (H)      Stroke (H)      Vascular dementia (H)      Past Surgical History   Past Surgical History:   Procedure Laterality Date     athroplasty hip Bilateral     2003, 2006     OTHER SURGICAL HISTORY      athroplasty hip     STENT       Medications   Home Meds  Prior to Admission medications    Medication Sig Start Date End Date Taking? Authorizing Provider   acetaminophen (TYLENOL) 500 MG tablet Take 500 mg by mouth every 6 hours as needed for mild pain    Reported, Patient   aspirin (ASA) 81 MG chewable tablet Take 1 tablet (81 mg) by mouth daily 4/8/22 5/8/22  Julio C Hay MD   atorvastatin (LIPITOR) 40 MG tablet Take 1 tablet (40 mg) by mouth daily 10/31/22   Bony Castaneda MD   blood glucose monitoring (NO BRAND SPECIFIED) meter device kit Use to test blood sugar 4 times daily.  Please provide glucose meter that is covered by insurance. 4/14/23   Bony Castaneda MD   carvedilol (COREG) 12.5 MG tablet Take 1 tablet (12.5 mg) by mouth 2 times daily (with meals) 10/31/22   Bony Castaneda MD   chlorhexidine (PERIDEX) 0.12 % solution Swish and spit 30 mL once daily for 14 days.  Patient not taking: Reported on 3/15/2023 12/9/22   Unknown, Entered By History   cyanocobalamin (VITAMIN B-12) 1000 MCG tablet Take 1 tablet (1,000 mcg) by mouth daily 3/15/23   Bony Castaneda MD   diclofenac (VOLTAREN) 1 % topical gel  Apply 2 g topically 4 times daily as needed for moderate pain 2/25/21   Bony Castaneda MD   doxazosin (CARDURA) 1 MG tablet Take 1 tablet (1 mg) by mouth At Bedtime 10/31/22   Bony Castaneda MD   DULoxetine (CYMBALTA) 20 MG capsule Take 1 capsule (20 mg) by mouth daily 10/31/22   Bony Castaneda MD   gabapentin (NEURONTIN) 100 MG capsule Take 1 capsule (100 mg) by mouth At Bedtime 3/15/23   Bony Castaneda MD   insulin glargine (LANTUS SOLOSTAR) 100 UNIT/ML pen INJECT 18 UNITS SUBCUTANEOUS AT BEDTIME 3/15/23   Bony Castaneda MD   insulin lispro (HUMALOG KWIKPEN) 100 UNIT/ML (1 unit dial) KWIKPEN Inject 1 unit glucose 200-250 2 units 251-300 3 units if above 300 USE 9-12 UNITS PER DAY Strength: 100 UNIT/ML 3/15/23   Bony Castaneda MD   insulin pen needle (31G X 8 MM) 31G X 8 MM miscellaneous Use 4 pen needles daily or as directed. 3/15/23   Bony Castaneda MD   levETIRAcetam (KEPPRA) 500 MG tablet Take 1 tablet (500 mg) by mouth 2 times daily 3/15/23   Bony Castaneda MD   loratadine (CLARITIN) 10 mg tablet [LORATADINE (CLARITIN) 10 MG TABLET] Take 10 mg by mouth daily. 6/28/21   Provider, Historical   melatonin 10 mg Tab Take 10 mg by mouth nightly as needed 6/28/21   Provider, Historical   methylcellulose (CITRUCEL) powder One tablespoon in 8 ounces daily if need for constipation 3/15/23   Bony Castaneda MD   nystatin (MYCOSTATIN) 291913 UNIT/GM external cream Apply topically 2 times daily Until rash clear 3/15/23   Bony Castaneda MD   SYNTHROID 75 MCG tablet Take 1 tablet (75 mcg) by mouth daily 11/1/22   Bony Castaneda MD   TRUE METRIX BLOOD GLUCOSE TEST test strip USE TO TEST BLOOD SUGAR 4 TO 5 TIMES DAILY OR AS DIRECTED 4/11/23   Bony Castaneda MD   Vitamin D3 (CHOLECALCIFEROL) 25 mcg (1000 units) tablet Take 1 tablet (25 mcg) by mouth daily 3/15/23   Bony Castaneda MD   zinc oxide (DESITIN) 20 % external ointment Apply topically as  needed for dry skin or irritation 3/15/23   Bony Castaneda MD       Scheduled Meds    sodium chloride (PF)  3 mL Intracatheter Q8H       Infusion Meds    sodium chloride 100 mL/hr at 23 1624       PRN Meds  haloperidol lactate, LORazepam, sodium chloride (PF)    Allergies   Allergies   Allergen Reactions     Seasonal Allergies      Family History   Family History   Problem Relation Age of Onset     Acute Myocardial Infarction Father      Heart Disease Maternal Grandmother      Dementia Maternal Grandmother      Myocardial Infarction Father      Dementia Paternal Grandmother      Heart Disease Paternal Grandmother      Social History   Social History     Tobacco Use     Smoking status: Former     Packs/day: 0.50     Years: 35.00     Pack years: 17.50     Types: Cigarettes     Quit date: 2018     Years since quittin.8     Smokeless tobacco: Never   Substance Use Topics     Alcohol use: Not Currently     Drug use: Not Currently       Review of Systems   Review of systems not obtained due to patient factors - mental status     PHYSICAL EXAMINATION  Temp:  [98.5  F (36.9  C)] 98.5  F (36.9  C)  Pulse:  [74] 74  Resp:  [18] 18  BP: (151)/(84) 151/84  SpO2:  [100 %] 100 %    General: Awake and alert in NAD   HEENT: Normocephalic, atraumatic, no epistaxis, no oral lesions  Resp: Breathing comfortably on room air  CV: Extremities warm and well perfused  Abdomen: +BS, Soft, non-distended, non-tender  Extremities: Warm and well perfused, peripheral pulses present, no edema  Skin: Not jaundiced, no rash, no ecchymoses  Psych: Normal mood and affect    Neuro:  Mental status: Awake, alert, attentive; minimal speech, is able to write her name and nod and shake head to simple questions. Follows all simple commands. Reportedly speaking to ED staff prior to arrival. Does write name but no other writing  Cranial nerves: Visual fields intact, Eyes conjugate, EOMI w/ normal and smooth pursuit, face expression  symmetric, hearing intact to conversation, shoulder shrug strong, does not open mouth or stick out tongue to command  Motor: Tone normal. LUE is 5/5, RUE is 5/5, LLE is 5/5, RLE is 5/5. No abnormal movements noted. Pronator drift absent.   Reflexes: No clonus  Sensory: Intact to light touch in all 4 extremities  Coordination: FNF intact bilaterally with no dysmetria; Normal heel-shin test bilaterally with no dysmetria noted  Gait: Deferred    Dysphagia Screen  Passed screening, no dysarthria - Regular Diet with thin liquids  05/20/2023 5:45 PM    Stroke Scales    NIHSS  1a. Level of Consciousness 0-->Alert, keenly responsive   1b. LOC Questions 2-->Answers neither question correctly   1c. LOC Commands 0-->Performs both tasks correctly   2.   Best Gaze 0-->Normal   3.   Visual 0-->No visual loss   4.   Facial Palsy 0-->Normal symmetrical movements   5a. Motor Arm, Left 0-->No drift, limb holds 90 (or 45) degrees for full 10 secs   5b. Motor Arm, Right 0-->No drift, limb holds 90 (or 45) degrees for full 10 secs   6a. Motor Leg, Left 0-->No drift, leg holds 30 degree position for full 5 secs   6b. Motor Leg, right 0-->No drift, leg holds 30 degree position for full 5 secs   7.   Limb Ataxia 0-->Absent   8.   Sensory 0-->Normal, no sensory loss   9.   Best Language 2-->Severe aphasia, all communication is through fragmentary expression, great need for inference, questioning, and guessing by the listener. Range of information that can be exchanged is limited, listener carries burden of. . . (see row details)   10. Dysarthria 0-->Normal   11. Extinction and Inattention  0-->No abnormality   Total 4 (05/20/23 1718)       Modified [place] Score (Pre-morbid)  3-Moderate disability; requiring some help, but able to walk without assistance    Imaging  I personally reviewed all imaging; relevant findings per the HPI.    Lab Results Data   CBC  Recent Labs   Lab 05/20/23  1312   WBC 8.2   RBC 3.79*   HGB 10.9*   HCT 34.9*   PLT  294     Basic Metabolic Panel   Recent Labs   Lab 05/20/23  1312      POTASSIUM 4.3   CHLORIDE 105   CO2 25   BUN 38.2*   CR 1.71*   *   VERONICA 9.5     Liver Panel  Recent Labs   Lab 05/20/23  1312   PROTTOTAL 6.9   ALBUMIN 3.7   BILITOTAL 0.3   ALKPHOS 189*   AST 17   ALT 17     INR    Recent Labs   Lab Test 12/10/22  1133 12/10/22  1132 05/03/22  0229   INR 1.00 1.1* 1.3*      Lipid Profile    Recent Labs   Lab Test 03/29/23  1158 04/06/22  0014 08/02/21  1009   CHOL 141 99 146   HDL 48* 34* 39*   LDL 72 39 75   TRIG 105 129 158*     A1C    Recent Labs   Lab Test 03/29/23  1158 10/31/22  1721 05/03/22  0227   A1C 11.2* 10.3* 8.9*     Troponin  No results for input(s): CTROPT, TROPONINIS, TROPONINI, GHTROP in the last 168 hours.       Stroke Code / Stroke Consult Data Data    Not a stroke code

## 2023-05-20 NOTE — ED TRIAGE NOTES
BIBA from home for what daughter called a possible seizure. Hx alzheimer's. Appears to be at baseline according to daughter. Has been keeping up with home meds.  VSS     Triage Assessment     Row Name 05/20/23 1257       Triage Assessment (Adult)    Airway WDL WDL       Respiratory WDL    Respiratory WDL WDL       Skin Circulation/Temperature WDL    Skin Circulation/Temperature WDL WDL       Cardiac WDL    Cardiac WDL WDL       Peripheral/Neurovascular WDL    Peripheral Neurovascular WDL WDL       Cognitive/Neuro/Behavioral WDL    Cognitive/Neuro/Behavioral WDL X    Level of Consciousness confused    Arousal Level opens eyes spontaneously    Orientation disoriented to;situation    Speech clear;spontaneous;logical    Mood/Behavior calm;cooperative       Pupils (CN II)    Pupil PERRLA yes    Pupil Size Left 3 mm    Pupil Size Right 3 mm       [place] Coma Scale    Best Eye Response 4-->(E4) spontaneous    Best Motor Response 6-->(M6) obeys commands    Best Verbal Response 5-->(V5) oriented    [place] Coma Scale Score 15

## 2023-05-21 ENCOUNTER — APPOINTMENT (OUTPATIENT)
Dept: CARDIOLOGY | Facility: CLINIC | Age: 63
DRG: 065 | End: 2023-05-21
Payer: COMMERCIAL

## 2023-05-21 ENCOUNTER — APPOINTMENT (OUTPATIENT)
Dept: SPEECH THERAPY | Facility: CLINIC | Age: 63
DRG: 065 | End: 2023-05-21
Payer: COMMERCIAL

## 2023-05-21 ENCOUNTER — APPOINTMENT (OUTPATIENT)
Dept: PHYSICAL THERAPY | Facility: CLINIC | Age: 63
DRG: 065 | End: 2023-05-21
Payer: COMMERCIAL

## 2023-05-21 ENCOUNTER — APPOINTMENT (OUTPATIENT)
Dept: OCCUPATIONAL THERAPY | Facility: CLINIC | Age: 63
DRG: 065 | End: 2023-05-21
Payer: COMMERCIAL

## 2023-05-21 LAB
ANION GAP SERPL CALCULATED.3IONS-SCNC: 13 MMOL/L (ref 7–15)
BUN SERPL-MCNC: 29.3 MG/DL (ref 8–23)
CALCIUM SERPL-MCNC: 8.8 MG/DL (ref 8.8–10.2)
CHLORIDE SERPL-SCNC: 105 MMOL/L (ref 98–107)
CREAT SERPL-MCNC: 1.48 MG/DL (ref 0.51–0.95)
DEPRECATED HCO3 PLAS-SCNC: 21 MMOL/L (ref 22–29)
ERYTHROCYTE [DISTWIDTH] IN BLOOD BY AUTOMATED COUNT: 13.6 % (ref 10–15)
GFR SERPL CREATININE-BSD FRML MDRD: 39 ML/MIN/1.73M2
GLUCOSE BLDC GLUCOMTR-MCNC: 165 MG/DL (ref 70–99)
GLUCOSE BLDC GLUCOMTR-MCNC: 328 MG/DL (ref 70–99)
GLUCOSE BLDC GLUCOMTR-MCNC: 334 MG/DL (ref 70–99)
GLUCOSE BLDC GLUCOMTR-MCNC: 460 MG/DL (ref 70–99)
GLUCOSE BLDC GLUCOMTR-MCNC: 530 MG/DL (ref 70–99)
GLUCOSE BLDC GLUCOMTR-MCNC: 564 MG/DL (ref 70–99)
GLUCOSE BLDC GLUCOMTR-MCNC: 77 MG/DL (ref 70–99)
GLUCOSE SERPL-MCNC: 139 MG/DL (ref 70–99)
HCT VFR BLD AUTO: 35 % (ref 35–47)
HGB BLD-MCNC: 10.7 G/DL (ref 11.7–15.7)
LVEF ECHO: NORMAL
MAGNESIUM SERPL-MCNC: 1.9 MG/DL (ref 1.7–2.3)
MCH RBC QN AUTO: 28.4 PG (ref 26.5–33)
MCHC RBC AUTO-ENTMCNC: 30.6 G/DL (ref 31.5–36.5)
MCV RBC AUTO: 93 FL (ref 78–100)
PHOSPHATE SERPL-MCNC: 3.8 MG/DL (ref 2.5–4.5)
PLATELET # BLD AUTO: 288 10E3/UL (ref 150–450)
POTASSIUM SERPL-SCNC: 3.9 MMOL/L (ref 3.4–5.3)
RBC # BLD AUTO: 3.77 10E6/UL (ref 3.8–5.2)
SARS-COV-2 RNA RESP QL NAA+PROBE: NEGATIVE
SODIUM SERPL-SCNC: 139 MMOL/L (ref 136–145)
TROPONIN I SERPL-MCNC: 0.02 NG/ML (ref 0–0.29)
WBC # BLD AUTO: 7.3 10E3/UL (ref 4–11)

## 2023-05-21 PROCEDURE — 82962 GLUCOSE BLOOD TEST: CPT

## 2023-05-21 PROCEDURE — 93306 TTE W/DOPPLER COMPLETE: CPT

## 2023-05-21 PROCEDURE — 93306 TTE W/DOPPLER COMPLETE: CPT | Mod: 26 | Performed by: STUDENT IN AN ORGANIZED HEALTH CARE EDUCATION/TRAINING PROGRAM

## 2023-05-21 PROCEDURE — 97161 PT EVAL LOW COMPLEX 20 MIN: CPT | Mod: GP

## 2023-05-21 PROCEDURE — 92610 EVALUATE SWALLOWING FUNCTION: CPT | Mod: GN

## 2023-05-21 PROCEDURE — 97530 THERAPEUTIC ACTIVITIES: CPT | Mod: GP

## 2023-05-21 PROCEDURE — 85027 COMPLETE CBC AUTOMATED: CPT | Performed by: STUDENT IN AN ORGANIZED HEALTH CARE EDUCATION/TRAINING PROGRAM

## 2023-05-21 PROCEDURE — 83735 ASSAY OF MAGNESIUM: CPT | Performed by: PSYCHIATRY & NEUROLOGY

## 2023-05-21 PROCEDURE — 84100 ASSAY OF PHOSPHORUS: CPT | Performed by: PSYCHIATRY & NEUROLOGY

## 2023-05-21 PROCEDURE — 250N000013 HC RX MED GY IP 250 OP 250 PS 637: Performed by: STUDENT IN AN ORGANIZED HEALTH CARE EDUCATION/TRAINING PROGRAM

## 2023-05-21 PROCEDURE — 36415 COLL VENOUS BLD VENIPUNCTURE: CPT | Performed by: STUDENT IN AN ORGANIZED HEALTH CARE EDUCATION/TRAINING PROGRAM

## 2023-05-21 PROCEDURE — 120N000002 HC R&B MED SURG/OB UMMC

## 2023-05-21 PROCEDURE — 80048 BASIC METABOLIC PNL TOTAL CA: CPT | Performed by: STUDENT IN AN ORGANIZED HEALTH CARE EDUCATION/TRAINING PROGRAM

## 2023-05-21 PROCEDURE — 97530 THERAPEUTIC ACTIVITIES: CPT | Mod: GO

## 2023-05-21 PROCEDURE — 250N000011 HC RX IP 250 OP 636: Performed by: STUDENT IN AN ORGANIZED HEALTH CARE EDUCATION/TRAINING PROGRAM

## 2023-05-21 PROCEDURE — 250N000012 HC RX MED GY IP 250 OP 636 PS 637: Performed by: STUDENT IN AN ORGANIZED HEALTH CARE EDUCATION/TRAINING PROGRAM

## 2023-05-21 PROCEDURE — 97165 OT EVAL LOW COMPLEX 30 MIN: CPT | Mod: GO

## 2023-05-21 PROCEDURE — 250N000011 HC RX IP 250 OP 636

## 2023-05-21 PROCEDURE — 99222 1ST HOSP IP/OBS MODERATE 55: CPT | Mod: GC | Performed by: PSYCHIATRY & NEUROLOGY

## 2023-05-21 PROCEDURE — 87635 SARS-COV-2 COVID-19 AMP PRB: CPT | Performed by: PSYCHIATRY & NEUROLOGY

## 2023-05-21 RX ORDER — CEFTRIAXONE 1 G/1
1 INJECTION, POWDER, FOR SOLUTION INTRAMUSCULAR; INTRAVENOUS EVERY 24 HOURS
Status: DISCONTINUED | OUTPATIENT
Start: 2023-05-21 | End: 2023-05-22 | Stop reason: HOSPADM

## 2023-05-21 RX ADMIN — DOXAZOSIN 1 MG: 1 TABLET ORAL at 22:12

## 2023-05-21 RX ADMIN — CYANOCOBALAMIN TAB 1000 MCG 1000 MCG: 1000 TAB at 08:51

## 2023-05-21 RX ADMIN — CLOPIDOGREL BISULFATE 75 MG: 75 TABLET ORAL at 08:52

## 2023-05-21 RX ADMIN — DOXAZOSIN 1 MG: 1 TABLET ORAL at 03:17

## 2023-05-21 RX ADMIN — LEVETIRACETAM 500 MG: 500 TABLET, FILM COATED ORAL at 19:48

## 2023-05-21 RX ADMIN — Medication 25 MCG: at 08:51

## 2023-05-21 RX ADMIN — ATORVASTATIN CALCIUM 40 MG: 40 TABLET, FILM COATED ORAL at 08:50

## 2023-05-21 RX ADMIN — INSULIN ASPART 4 UNITS: 100 INJECTION, SOLUTION INTRAVENOUS; SUBCUTANEOUS at 12:34

## 2023-05-21 RX ADMIN — CARVEDILOL 12.5 MG: 12.5 TABLET, FILM COATED ORAL at 08:51

## 2023-05-21 RX ADMIN — ASPIRIN 81 MG CHEWABLE TABLET 81 MG: 81 TABLET CHEWABLE at 08:52

## 2023-05-21 RX ADMIN — CEFTRIAXONE SODIUM 1 G: 1 INJECTION, POWDER, FOR SOLUTION INTRAMUSCULAR; INTRAVENOUS at 13:43

## 2023-05-21 RX ADMIN — INSULIN ASPART 4 UNITS: 100 INJECTION, SOLUTION INTRAVENOUS; SUBCUTANEOUS at 17:21

## 2023-05-21 RX ADMIN — CARVEDILOL 12.5 MG: 12.5 TABLET, FILM COATED ORAL at 17:22

## 2023-05-21 RX ADMIN — ENOXAPARIN SODIUM 40 MG: 40 INJECTION SUBCUTANEOUS at 19:50

## 2023-05-21 RX ADMIN — INSULIN GLARGINE 22 UNITS: 100 INJECTION, SOLUTION SUBCUTANEOUS at 23:54

## 2023-05-21 RX ADMIN — LEVETIRACETAM 500 MG: 500 TABLET, FILM COATED ORAL at 08:51

## 2023-05-21 RX ADMIN — DULOXETINE HYDROCHLORIDE 20 MG: 20 CAPSULE, DELAYED RELEASE ORAL at 08:51

## 2023-05-21 ASSESSMENT — ACTIVITIES OF DAILY LIVING (ADL)
ADLS_ACUITY_SCORE: 41
ADLS_ACUITY_SCORE: 37
ADLS_ACUITY_SCORE: 45
ADLS_ACUITY_SCORE: 35
ADLS_ACUITY_SCORE: 45
ADLS_ACUITY_SCORE: 37
ADLS_ACUITY_SCORE: 41
ADLS_ACUITY_SCORE: 35
ADLS_ACUITY_SCORE: 45
ADLS_ACUITY_SCORE: 41
ADLS_ACUITY_SCORE: 41
ADLS_ACUITY_SCORE: 35

## 2023-05-21 NOTE — PROGRESS NOTES
"   05/21/23 1440   Appointment Info   Signing Clinician's Name / Credentials (OT) Leola Christian, OTR/L   Living Environment   People in Home child(tiffany), adult   Current Living Arrangements apartment   Home Accessibility no concerns   Transportation Anticipated family or friend will provide   Living Environment Comments Pt presents to be inaccurate historian and majority of living arrangements obtained from previous encounters. Per chart, pt lives with adult daughter of whoom is pt PCA, authorized 40hrs per week. Per chart pt receives additional 30hrs of PCA services per week to assist with ADL/IADL as needed. tub/shower combination. Amb. with FWW and manual wc in community.   Self-Care   Usual Activity Tolerance moderate   Current Activity Tolerance fair   Regular Exercise No   Equipment Currently Used at Home walker, standard;wheelchair, manual;shower chair   Fall history within last six months   (did not report)   Activity/Exercise/Self-Care Comment Per chart review, pt receives mod. A with all ADL, however, pt reporting near IND dressing, toileting, bathing. Of note, chart states incontinence and RN staff confirms this IP stay.   Instrumental Activities of Daily Living (IADL)   IADL Comments Pt daughter completes   General Information   Onset of Illness/Injury or Date of Surgery 05/20/23   Referring Physician Zoltan Albert MD   Patient/Family Therapy Goal Statement (OT) did not state   Additional Occupational Profile Info/Pertinent History of Current Problem Per EMR, \"Isabell Matthews is a 62 year old female with a history of epilepsy, T2DM, recurrent UTI, CKD, vascular vs Alzheimer's dementia, and previous stroke who presents for evaluation of seizure like activity. History is limited to chart review, as attempts to reach daughter were unsuccessful. The patient was reportedly at home with her daughter today when she had an episode of seizure like activity. No description of the event is available, but per EMS on " "arrival the patient was at her baseline. The patient was brought in for evaluation and initially complained of a mild HA. She was found to have a UTI and was treated with ceftriaxone. She underwent a MRI brain and required Haldol 2 mg for procedural sedation. This MRI was notable for multifocal stroke of right paramedian chuck and punctate right MCA territory stroke. The patient reportedly was not complaining of any new symptoms.\"   Existing Precautions/Restrictions fall;seizures   Left Upper Extremity (Weight-bearing Status) full weight-bearing (FWB)   Right Upper Extremity (Weight-bearing Status) full weight-bearing (FWB)   Left Lower Extremity (Weight-bearing Status) full weight-bearing (FWB)   Right Lower Extremity (Weight-bearing Status) full weight-bearing (FWB)   Cognitive Status Examination   Orientation Status person;place   Cognitive Status Comments not oriented to date or time   Visual Perception   Visual Impairment/Limitations WFL   Sensory   Sensory Quick Adds sensation intact   Pain Assessment   Patient Currently in Pain No   Posture   Posture forward head position;protracted shoulders   Range of Motion Comprehensive   General Range of Motion no range of motion deficits identified   Strength Comprehensive (MMT)   Comment, General Manual Muscle Testing (MMT) Assessment generalized weakness and deconditioning   Bed Mobility   Comment (Bed Mobility) CGA-min. Ax1   Transfers   Transfer Comments min. Ax1-2   Balance   Balance Assessment standing balance: dynamic   Balance Comments demsontrating posterior lean and bilateral locking of knees without cues   Activities of Daily Living   BADL Assessment/Intervention bathing;upper body dressing;lower body dressing;grooming;toileting   Bathing Assessment/Intervention   [place] Level (Bathing) minimum assist (75% patient effort)   Comment, (Bathing) anticipate per clinical judgement   Assistive Devices (Bathing) shower chair   Upper Body Dressing " Assessment/Training   Comment, (Upper Body Dressing) anticipate per clinical judgement   [place] Level (Upper Body Dressing) minimum assist (75% patient effort)   Lower Body Dressing Assessment/Training   Comment, (Lower Body Dressing) anticipate per clinical judgement   [place] Level (Lower Body Dressing) contact guard assist   Grooming Assessment/Training   [place] Level (Grooming) contact guard assist   Comment, (Grooming) anticipate per clinical judgement   Toileting   Comment, (Toileting) anticipate per clinical judgement   [place] Level (Toileting) moderate assist (50% patient effort)   Clinical Impression   Criteria for Skilled Therapeutic Interventions Met (OT) Yes, treatment indicated   OT Diagnosis decreased ADL IND   OT Problem List-Impairments impacting ADL problems related to;activity tolerance impaired;cognition;communication   Assessment of Occupational Performance 3-5 Performance Deficits   Identified Performance Deficits dressing, bathing, toileting, grooming/ hygiene   Planned Therapy Interventions (OT) ADL retraining;cognition;transfer training;home program guidelines;progressive activity/exercise   Clinical Decision Making Complexity (OT) low complexity   Anticipated Equipment Needs Upon Discharge (OT)   (TBD)   Risk & Benefits of therapy have been explained evaluation/treatment results reviewed;care plan/treatment goals reviewed;participants included;patient  (needs review/reinforcement)   Clinical Impression Comments Pt will benefit from skilled IP OT to maximize safety and independence in ADL/transfer participation prior to return home   OT Total Evaluation Time   OT Eval, Low Complexity Minutes (86996) 12   OT Goals   Therapy Frequency (OT) 6 times/wk   OT Predicted Duration/Target Date for Goal Attainment 05/26/23   OT Goals Hygiene/Grooming;Upper Body Dressing;Lower Body Dressing;Transfers;Toilet Transfer/Toileting;Cognition   OT: Hygiene/Grooming supervision/stand-by  assist   OT: Upper Body Dressing Supervision/stand-by assist   OT: Lower Body Dressing Supervision/stand-by assist   OT: Transfer Supervision/stand-by assist   OT: Toilet Transfer/Toileting Minimal assist   OT: Cognitive Patient/caregiver will verbalize understanding of cognitive assessment results/recommendations as needed for safe discharge planning   Therapeutic Activities   Therapeutic Activity Minutes (63754) 20   Symptoms noted during/after treatment fatigue;dizziness   Treatment Detail/Skilled Intervention OT: Post-evaluation and upon return to room in PM once BP stabilized (still elevated SBP >150 but within parameters), pt agreeable to OOB activity. Facilitated increased IND in funcitonal transfers and dressing this date to return to baseline. Pt initially completed bed mobility supine >sit with CGA-min. Ax2 and progressed to CGA x1 upon return sit >supine at end of session (intermittent min. A at BLEs). Seated EOB pt initially required min. A and progressed to SBA-CGA to complete sock don/doffing with set up A. and min. vcs. Engaged pt in x3 STS with min. Ax2 progressing to min. Ax1 with continued repetition and ocmmand following. Pt endorsing initial dizziness and resolves with additional time to adjust body in space. When standing, pt demosntrating heavy posterior lean and required verbal, tactile, and gestural cues to shift weight over toes and place flat feet onto ground. Demo'd marching in place with CGA-min. Ax1 and took 3-4 lateral steps toward HOB prior to return. BP wit hsignificant drop from SBP 150s to 110s and pt returned to bed. all other vitals within therapeutic range. Left with call light in reach and TV turned on. Immediate needs met   OT Discharge Planning   OT Plan functional transfers, seated vs. standing ADL   OT Discharge Recommendation (DC Rec) home with home care occupational therapy   OT Rationale for DC Rec Team with difficulty contacting daughter to obtain pt baseline earlier  this date. will need to confrim ADL baseline, however, with chart review pt appears to be near ADL baseline Ax1-2 and anticipate safe return with PCA assist if this is the case and pending physical therapy recommendations with ambulation. Will benefit from ongoing IP OT/PT to progress to baseline Ax1.   OT Brief overview of current status CGA-min. Ax1-2, gait belt at all times with posterior lean in standing. Verbal cues. Holding food/water in mouth despite ability to swallow. Seated BADL with set up/supervision at minimum   Total Session Time   Timed Code Treatment Minutes 20   Total Session Time (sum of timed and untimed services) 32

## 2023-05-21 NOTE — PROGRESS NOTES
"      [facility]    Stroke Progress Note    Interval EventsNo events overnight.  Patient was able to speak a few words today, but was mostly quiet.  Following commands.    HPI Summary  \"Isabell Matthews is a 62 year old female with a history of epilepsy, T2DM, recurrent UTI, CKD, vascular vs Alzheimer's dementia, and previous stroke who presents for evaluation of seizure like activity. History is limited to chart review, as attempts to reach daughter were unsuccessful. The patient was reportedly at home with her daughter today when she had an episode of seizure like activity. No description of the event is available, but per EMS on arrival the patient was at her baseline. The patient was brought in for evaluation and initially complained of a mild HA. She was found to have a UTI and was treated with ceftriaxone. She underwent a MRI brain and required Haldol 2 mg for procedural sedation. This MRI was notable for multifocal stroke of right paramedian chuck and punctate right MCA territory stroke. The patient reportedly was not complaining of any new symptoms.     On evaluation, the patient is not answering any questions with minimal attempts to speak despite repeated prompting. She is able to write her name and nod and shake her head to some questions and does indicate that she has a history of seizure and shrugs when asked if had a seizure today. Has had a history of non-compliance in the past but unable to assess compliance given limited history.\"    On 5/21, further history was obtained by asking the daughter. In terms of her baseline, it seems that patient can use a walker to ambulate short distances from 1 room to the other, but needs a wheelchair if it is longer.  She is able to hold a conversation, but has slowed thought process and replies.    Stroke Evaluation Summarized    MRI/Head CT MRI Brain 5/20  IMPRESSION:  1. Acute infarcts within the right paramedian chuck and " right  parasagittal frontal lobe.  2. No abnormal signal within the medial temporal lobes. Moderate  temporal predominant volume loss and chronic small vessel ischemic  disease.  3. Contrast portion of the exam was unable to be completed due to  patient intolerance.       Intracranial Vasculature Head CTA demonstrates focal severe stenosis at the right M1/M2  junction. Mild stenosis at the left M1 division M2 junction.   Cervical Vasculature Neck CTA demonstrates no stenosis of the major cervical arteries.     Echocardiogram Left ventricular size, wall motion and function are normal. The ejection  fraction is 55-60%.  Global right ventricular function is normal.  Small circumferential pericardial effusion is present without any hemodynamic  significance.  Chamber compression is not present; there is no evidence for tamponade.   EKG/Telemetry Sinus rhythm   Other Testing Not Applicable     LDL  5/20/2023: 71 mg/dL   A1C  3/29/2023: 11.2 %  5/20/2023: 10.8 %   Troponin 5/20/2023: 36 ng/L; 0.02 ng/mL       Impression    62 year old female with a history of epilepsy, T2DM, recurrent UTI, CKD, vascular vs Alzheimer's dementia, and previous stroke who presents for evaluation of encephalopathy.  Initial EMS report was for concerns for seizure-like activity, however, on confirmation with daughter, daughter says that she called EMS because patient was noted to be more confused since Tuesday, and especially yesterday was not really able to use her walker, and so daughter was worried that she might have a UTI that has required hospitalizations in the past.Imaging with multifocal stroke in 2 vascular territories, and the right centrum semiovale and right chuck.      On chart review, it also appears that she had a left basal ganglia hemorrhagic infarct 2 years ago. At this stage, given that the strokes are in two different territories etiology is ESUS. Will admit the patient to complete stroke workup and identify appropriate  treatment regimen.     In terms of her baseline, it seems that patient can use a walker to ambulate short distances from 1 room to the other, but needs a wheelchair if it is longer.  She is able to hold a conversation, but has slowed thought process and replies.  She needs help with her ADLs such as brushing, clothing, bathing, and cannot perform any IADLs.  In terms of residual deficits, she has swallowing difficulties, and possibly increased tone from her previous hemorrhagic stroke.     Plan  #Multifocal acute stroke ()  #History of stroke  - Neurochecks Q 4 hours  - Permissive HTN; labetalol PRN for SBP > 220  - Avoid hypotonic IV fluids  - Daily aspirin 81 mg for secondary stroke prevention  - Plavix (clopidogrel) 300 mg PO loading dose x 1 5/20  - Plavix (clopidogrel) 75 mg PO Daily  -LDL 71, A1c 10.8.  - Statin: PTA atrovastatin 40 mg, consider increasing to 80 mg.  - TTE with Bubble Study  - Telemetry, EKG  - Bedside Glucose Monitoring  - Nutrition: Regular diet, passed swallow  - A1c, Lipid Panel, Troponin x 3  - PT/OT/SLP  - Stroke Education  - Depression Screen  - Apnea Screen  - Euthermia, Euglycemia      #Seizure like activity  #History of epilepsy  Med rec says that she should be on Keppra 500 mg twice daily, but on asking daughter, daughter says that she is not on this medication.  Per chart review, she had 1 episode of left forced gaze deviation, with stiffness/rigidity in December 2022, for which she was admitted to general neurology, and started on Keppra.  24-hour EEG at that time demonstrated no electrographic seizures or epileptiform discharges.  Per daughter, this is the only seizure-like episode she had, and current admission is not for seizure-like activity.  - Start Keppra 500 mg BID  - Keppra level  - Seizure precautions     #UTI  #Hx of recurrent UTIs  Has had 3-4 UTIs in the past year per daughter.  UA with pyuria.  Clinically somnolent, which could be related to the stroke, but UTI can  also cause encephalopathy, so we will treat with antibiotics.  - Ceftriaxone 1g Qday  -Waiting for cultures.     #T2DM  -Per chart review, it appears that she has brittle diabetes.  She has had multiple episodes in the hospital in the past, but her glucose would drop down to 15, and she would become unresponsive.  - MDSSI  -Glucose checks  -D50 50 mL as needed ordered, can be given if patient's blood glucose is low/she is unresponsive.     #CKD  Similar GFR from 3/29/23 (35->33), less likely an EVON.   - Daily BMP     #Dementia  -  2/2 Vascular vs Alzheimer's     Patient Follow-up    - in 4-6 weeks with general neurology ([phone])    Prophylaxis            For VTE Prevention:  - enoxaparin     For Acid Suppression:  - GI prophylaxis is not indicated     Code Status  Full Code, per previous code statuses, will need to be readdessed in AM     During initial physical assessment, the plan of care was discussed and developed with patient.  Plan of care includes: the above.     Patient was admitted via [facility] ([employer])     The patient will be admitted to the stroke team.   ______________________________________________________    Clinically Significant Risk Factors Present on Admission                # Drug Induced Platelet Defect: home medication list includes an antiplatelet medication   # Hypertension: Noted on problem list   # Dementia: noted on problem list          # CKD, Stage 3b (GFR 30-44): Will monitor and treat as appropriate  - last Cr =  1.48 mg/dL (Ref range: 0.51 - 0.95 mg/dL)  - last GFR = 39 mL/min/1.73m2 (Ref range: >60 mL/min/1.73m2)        Medications   Scheduled Meds    aspirin  81 mg Oral or Feeding Tube Daily     atorvastatin  40 mg Oral or Feeding Tube Daily     carvedilol  12.5 mg Oral or Feeding Tube BID w/meals     cefTRIAXone  1 g Intravenous Q24H     clopidogrel  75 mg Oral or NG Tube Daily     cyanocobalamin  1,000 mcg Oral or Feeding Tube Daily     doxazosin  1 mg  Oral At Bedtime     DULoxetine  20 mg Oral or Feeding Tube Daily     enoxaparin ANTICOAGULANT  40 mg Subcutaneous Q24H     insulin aspart  1-7 Units Subcutaneous TID AC     insulin aspart  1-5 Units Subcutaneous At Bedtime     levETIRAcetam  500 mg Oral or Feeding Tube BID     levothyroxine  75 mcg Oral or Feeding Tube Daily     sodium chloride (PF)  3 mL Intracatheter Q8H     Vitamin D3  25 mcg Oral or Feeding Tube Daily       Infusion Meds    - MEDICATION INSTRUCTIONS -         PRN Meds  acetaminophen, glucose **OR** dextrose **OR** glucagon, labetalol **OR** hydrALAZINE, lidocaine 4%, lidocaine (buffered or not buffered), - MEDICATION INSTRUCTIONS -, sodium chloride (PF)       PHYSICAL EXAMINATION  Temp:  [98.1  F (36.7  C)-99  F (37.2  C)] 99  F (37.2  C)  Pulse:  [72-79] 77  Resp:  [11-18] 13  BP: (143-158)/(72-84) 158/80  SpO2:  [94 %-100 %] 97 %      General: Awake and alert in NAD   HEENT: Normocephalic, atraumatic, no epistaxis, no oral lesions  Resp: Breathing comfortably on room air  CV: Extremities warm and well perfused  Abdomen: +BS, Soft, non-distended, non-tender  Extremities: Warm and well perfused, peripheral pulses present, no edema  Skin: Not jaundiced, no rash, no ecchymoses  Psych: Normal mood and affect     Neuro:  Mental status: Awake, alert, attentive; minimal speech but is able to slowly respond yes and no questions which is an improvement from yesterday's exam. Is able to write her name and nod and shake head to simple questions. Follows all simple commands.  Cranial nerves: Visual fields intact, Eyes conjugate, EOMI w/ normal and smooth pursuit, face expression symmetric, hearing intact to conversation, shoulder shrug strong, does not open mouth or stick out tongue to command  Motor: Tone normal.  At least antigravity in all extremities without any drift.  No abnormal movements noted. Pronator drift absent.   Reflexes: No clonus. Hyperreflexic on patellars (3+).   Sensory: Intact to light  touch in all 4 extremities  Coordination: FNF intact bilaterally with no dysmetria; Normal heel-shin test bilaterally with no dysmetria noted  Gait: Deferred    Stroke Scales    NIHSS  1a. Level of Consciousness 0-->Alert, keenly responsive   1b. LOC Questions 2-->Answers neither question correctly   1c. LOC Commands 0-->Performs both tasks correctly   2.   Best Gaze 0-->Normal   3.   Visual 0-->No visual loss   4.   Facial Palsy 0-->Normal symmetrical movements   5a. Motor Arm, Left 0-->No drift, limb holds 90 (or 45) degrees for full 10 secs   5b. Motor Arm, Right 0-->No drift, limb holds 90 (or 45) degrees for full 10 secs   6a. Motor Leg, Left 0-->No drift, leg holds 30 degree position for full 5 secs   6b. Motor Leg, right 0-->No drift, leg holds 30 degree position for full 5 secs   7.   Limb Ataxia 0-->Absent   8.   Sensory 0-->Normal, no sensory loss   9.   Best Language 2-->Severe aphasia, all communication is through fragmentary expression, great need for inference, questioning, and guessing by the listener. Range of information that can be exchanged is limited, listener carries burden of. . . (see row details)   10. Dysarthria 0-->Normal   11. Extinction and Inattention  0-->No abnormality   Total 4 (05/20/23 1718)       Modified Lehigh Score (Pre-morbid)  3-Moderate disability; requiring some help, but able to walk without assistance    Imaging  I personally reviewed all imaging; relevant findings per HPI.     Lab Results Data   CBC  Recent Labs   Lab 05/21/23  0627 05/20/23  1312   WBC 7.3 8.2   RBC 3.77* 3.79*   HGB 10.7* 10.9*   HCT 35.0 34.9*    294     Basic Metabolic Panel    Recent Labs   Lab 05/21/23  0847 05/21/23  0627 05/21/23  0317 05/20/23 1952 05/20/23  1312   NA  --  139  --   --  142   POTASSIUM  --  3.9  --   --  4.3   CHLORIDE  --  105  --   --  105   CO2  --  21*  --   --  25   BUN  --  29.3*  --   --  38.2*   CR  --  1.48*  --   --  1.71*  1.71*   * 139* 77   < > 149*    VERONICA  --  8.8  --   --  9.5    < > = values in this interval not displayed.     Liver Panel  Recent Labs   Lab 05/20/23  1312   PROTTOTAL 6.9   ALBUMIN 3.7   BILITOTAL 0.3   ALKPHOS 189*   AST 17   ALT 17     INR    Recent Labs   Lab Test 12/10/22  1133 12/10/22  1132 05/03/22  0229   INR 1.00 1.1* 1.3*      Lipid Profile    Recent Labs   Lab Test 05/20/23  1312 03/29/23  1158 04/06/22  0014   CHOL 136 141 99   HDL 35* 48* 34*   LDL 71 72 39   TRIG 152* 105 129     A1C    Recent Labs   Lab Test 05/20/23  1312 03/29/23  1158 10/31/22  1721   A1C 10.8* 11.2* 10.3*     Troponin    Recent Labs   Lab 05/20/23  1924 05/20/23  1312   CTROPT  --  36*   TROPONINI 0.02  --           Data

## 2023-05-21 NOTE — ED NOTES
Pt ate 1/2 of her roast beef and mash potatoes. She began sneezing while food was in her mouth which resulted in productive coughing. Pt repeatedly fell asleep while eating. Continues to be a 1:1 while eating. Feeding lasted 45 minutes. At 45 minutes Pt finished eating and requested the food be taken away. Food was taken away. Improper diet delivered, Gwen Jay RN was notified and approved of the meal.

## 2023-05-21 NOTE — PROGRESS NOTES
"Evaluation completed in ED.     Angelique Aguirre PT. Pager [phone]      05/21/23 4377   Appointment Info   Signing Clinician's Name / Credentials (PT) Angelique Aguirre PT, DPT   Rehab Comments (PT) Co-eval with OT, increased risk for falls   Living Environment   People in Home child(tiffany), adult   Current Living Arrangements apartment   Home Accessibility no concerns   Transportation Anticipated family or friend will provide   Living Environment Comments Pt is questionable historian. Per chart review, pt lives in an apartment with her daughter who provides PCA services 40 hours/wk for the pt. Pt receives an additional 30 hours of PCA help. Family provides transportation.   Self-Care   Usual Activity Tolerance moderate   Current Activity Tolerance fair   Regular Exercise No   Equipment Currently Used at Home walker, standard;wheelchair, manual;shower chair   Fall history within last six months yes   Number of times patient has fallen within last six months 1   Activity/Exercise/Self-Care Comment Pt reports she is Juliet with ADL's and mobility using FWW within home and w/c in community. Requires A with bathing. However, chart reports pt receives assistance with all ADL's. Reported 1 fall.   General Information   Onset of Illness/Injury or Date of Surgery 05/20/23   Referring Physician Zoltan Albert MD   Patient/Family Therapy Goals Statement (PT) none stated   Pertinent History of Current Problem (include personal factors and/or comorbidities that impact the POC) per EMR: \"\"Isabell Matthews is a 62 year old female with a history of epilepsy, T2DM, recurrent UTI, CKD, vascular vs Alzheimer's dementia, and previous stroke who presents for evaluation of seizure like activity. History is limited to chart review, as attempts to reach daughter were unsuccessful. The patient was reportedly at home with her daughter today when she had an episode of seizure like activity. No description of the event is available, but per EMS on arrival the " "patient was at her baseline. The patient was brought in for evaluation and initially complained of a mild HA. She was found to have a UTI and was treated with ceftriaxone. She underwent a MRI brain and required Haldol 2 mg for procedural sedation. This MRI was notable for multifocal stroke of right paramedian chuck and punctate right MCA territory stroke. The patient reportedly was not complaining of any new symptoms.\"   Existing Precautions/Restrictions fall   Weight-Bearing Status - LUE full weight-bearing   Weight-Bearing Status - RUE full weight-bearing   Weight-Bearing Status - LLE full weight-bearing   Weight-Bearing Status - RLE full weight-bearing   Cognition   Orientation Status (Cognition) oriented to;person   Pain Assessment   Patient Currently in Pain No   Integumentary/Edema   Integumentary/Edema no deficits were identifed   Posture    Posture Forward head position;Protracted shoulders   Range of Motion (ROM)   Range of Motion ROM is WFL   Strength (Manual Muscle Testing)   Strength (Manual Muscle Testing) Deficits observed during functional mobility   Strength Comments grossly 3/5 in B LE; generalized weakness   Bed Mobility   Comment, (Bed Mobility) supine > sit with min A for trunk   Transfers   Comment, (Transfers) sit > stand with min A x2 and HHA   Gait/Stairs (Locomotion)   Comment, (Gait/Stairs) gait impaired   Balance   Balance other (describe)   Balance Comments fair(-)/fair sitting balance; poor/fair(-) standing balance   Sensory Examination   Sensory Perception other (describe)   Sensory Perception Comments unable to report   Coordination   Coordination no deficits were identified   Muscle Tone   Muscle Tone no deficits were identified   Clinical Impression   Criteria for Skilled Therapeutic Intervention Yes, treatment indicated   PT Diagnosis (PT) impaired functional mobility; increased risk for falls   Influenced by the following impairments decreased balance, strength, and endurance "   Functional limitations due to impairments difficulty with functional mobility   Clinical Presentation (PT Evaluation Complexity) Stable/Uncomplicated   Clinical Presentation Rationale per clinical judgment   Clinical Decision Making (Complexity) low complexity   Planned Therapy Interventions (PT) balance training;bed mobility training;gait training;home exercise program;motor coordination training;neuromuscular re-education;patient/family education;postural re-education;ROM (range of motion);stair training;strengthening;stretching;transfer training;progressive activity/exercise;risk factor education;home program guidelines   Anticipated Equipment Needs at Discharge (PT)   (tbd)   Risk & Benefits of therapy have been explained evaluation/treatment results reviewed;care plan/treatment goals reviewed;risks/benefits reviewed;current/potential barriers reviewed;participants voiced agreement with care plan;participants included;patient   PT Total Evaluation Time   PT Moses Low Complexity Minutes (33189) 10   Physical Therapy Goals   PT Frequency 6x/week   PT Predicted Duration/Target Date for Goal Attainment 06/04/23   PT: Bed Mobility Independent;Supine to/from sit;Rolling;Bridging  (with HOB flat)   PT: Transfers Modified independent;Sit to/from stand;Bed to/from chair  (with LRAD)   PT: Gait Modified independent;150 feet  (with LRAD)   PT Discharge Planning   PT Plan progress mobility as able, confirm home setup/assist available   PT Discharge Recommendation (DC Rec) home with assist;home with home care physical therapy   PT Rationale for DC Rec Pt is demonstrating functional mobility below baseline. Currently Ax1-2 for ADL's and mobility. Increased risk for falls. Once medically appropriate, anticipated to d/c home with assist from daughter and PCA for ADL's and mobility with FWW. Will benefit from HH PT to reduce risk for falls within her home and readmission. Will assess and update as appropriate.   PT Brief  overview of current status Ax1 with FWW/HHA and gait belt for transfers; up to chair 3x/day; risk for falls (chair alarm)

## 2023-05-21 NOTE — PHARMACY-CONSULT NOTE
Pharmacy Consult to evaluate for medication related stroke core measures    Isabell Matthews, 62 year old female admitted for sz like activity on 5/20/2023.    Thrombolytic was not given because of Time from onset contraindications    VTE Prophylaxis Enoxaparin given on 5/20 as appropriate prior to end of hospital day 2.    Antithrombotic: clopidogrel started on 5/20, as appropriate by end of hospital day 2. Continue antithrombotic therapy on discharge to meet quality measures, unless contraindicated.    Anticoagulation if history of A-fib/flutter: Patient does not have history of A-fib/flutter - anticoagulation not required for medication related stroke core measures.     LDL Cholesterol Calculated   Date Value Ref Range Status   05/20/2023 71 <=100 mg/dL Final       Patient currently receiving Lipitor (atorvastatin) continue statin on discharge to meet quality measures, unless contraindicated.    Recommendations: None at this time    Thank you for the consult.    Divya Smith [facility] 5/21/2023 8:24 AM

## 2023-05-21 NOTE — PROGRESS NOTES
05/21/23 0909   Appointment Info   Signing Clinician's Name / Credentials (SLP) Julieta Mckeon MS CCC-SLP   General Information   Onset of Illness/Injury or Date of Surgery 05/20/23   Referring Physician Zoltan Albert MD   Patient/Family Therapy Goal Statement (SLP) None stated   Pertinent History of Current Problem Isabell Matthews is a 62 year old female with a history of epilepsy, T2DM, recurrent UTI, CKD, vascular vs Alzheimer's dementia, and previous stroke who presents for evaluation of seizure like activity. History is limited to chart review, as attempts to reach daughter were unsuccessful. The patient was reportedly at home with her daughter today when she had an episode of seizure like activity. No description of the event is available, but per EMS on arrival the patient was at her baseline. The patient was brought in for evaluation and initially complained of a mild HA. She was found to have a UTI and was treated with ceftriaxone. She underwent a MRI brain and required Haldol 2 mg for procedural sedation. This MRI was notable for multifocal stroke of right paramedian chuck and punctate right MCA territory stroke. The patient reportedly was not complaining of any new symptoms.     On evaluation, the patient is not answering any questions with minimal attempts to speak despite repeated prompting. She is able to write her name and nod and shake her head to some questions and does indicate that she has a history of seizure and shrugs when asked if had a seizure today. Has had a history of non-compliance in the past but unable to assess compliance given limited history. Clinical swallow eval completed at 0845 per MD orders.   General Observations Pt nonverbal, did not follow any commands   Type of Evaluation   Type of Evaluation Swallow Evaluation   Oral Motor   Oral Musculature unable to assess due to poor participation/comprehension   Dentition (Oral Motor)   Dentition (Oral Motor) adequate dentition    Facial Symmetry (Oral Motor)   Facial Symmetry (Oral Motor) unable/difficult to assess   Lip Function (Oral Motor)   Lip Range of Motion (Oral Motor) unable/difficult to assess   Tongue Function (Oral Motor)   Tongue ROM (Oral Motor) unable/difficult to assess   Jaw Function (Oral Motor)   Jaw Function (Oral Motor) WNL   Cough/Swallow/Gag Reflex (Oral Motor)   Volitional Throat Clear/Cough (Oral Motor) unable/difficult to assess   Vocal Quality/Secretion Management (Oral Motor)   Vocal Quality (Oral Motor) unable/difficult to assess  (pt did not vocalize despite max encouragement)   Secretion Management (Oral Motor) WNL   General Swallowing Observations   Past History of Dysphagia Pt familiar to SLP caseload with recommendations for pureed diet and thin liquids 12/2022. No family present to provide case hx. Pt nonverbal and unable to provide hx.   Respiratory Support (General Swallowing Observations) none   Current Diet/Method of Nutritional Intake (General Swallowing Observations, NIS) thin liquids (level 0);regular diet   Swallowing Evaluation Clinical swallow evaluation   Clinical Swallow Evaluation   Feeding Assistance dependent   Clinical Swallow Evaluation Textures Trialed thin liquids;pureed;solid foods   Clinical Swallow Eval: Thin Liquid Texture Trial   Mode of Presentation, Thin Liquids straw;fed by clinician   Volume of Liquid or Food Presented 3 oz   Oral Phase of Swallow   (bolus holding)   Pharyngeal Phase of Swallow intact   Diagnostic Statement No overt s/sx of aspiration, pt with frequent bolus holding which required max cues to swallow.   Clinical Swallow Evaluation: Puree Solid Texture Trial   Mode of Presentation, Puree spoon;fed by clinician   Volume of Puree Presented 2 tbsp   Oral Phase, Puree   (bolus holding)   Oral Residue, Puree mid posterior tongue   Pharyngeal Phase, Puree intact   Diagnostic Statement No overt s/sx of aspiration, pt with frequent bolus holding which required max cues  to swallow.   Clinical Swallow Evaluation: Solid Food Texture Trial   Mode of Presentation fed by clinician   Volume Presented 1 tbsp   Oral Phase impaired mastication;residue in oral cavity   Pharyngeal Phase impaired;no awareness of problems   Diagnostic Statement Pt with insufficient mastication, bolus removed from oral cavity by SLP due to no attempts at mastication.   Esophageal Phase of Swallow   Patient reports or presents with symptoms of esophageal dysphagia No   Swallowing Recommendations   Diet Consistency Recommendations thin liquids (level 0);pureed (level 4)   Supervision Level for Intake 1:1 supervision needed   Mode of Delivery Recommendations bolus size, small;food moistened;slow rate of intake   Swallowing Maneuver Recommendations alternate food and liquid intake;extra swallow   Monitoring/Assistance Required (Eating/Swallowing) stop eating activities when fatigue is present;cue for finger/lingual sweep if oral pocketing present;check mouth frequently for oral residue/pocketing;optimize oral intake to minimize need for tube feeding;monitor for cough or change in vocal quality with intake   Recommended Feeding/Eating Techniques (Swallow Eval) maintain upright sitting position for eating;maintain upright posture during/after eating for 30 minutes;minimize distractions during oral intake;provide assist with feeding;provide oral hygiene prior to intake;provide 6 smaller meals throughout day;set-up and prepare tray   Medication Administration Recommendations, Swallowing (SLP) crush pills (if able) and serve in puree   Instrumental Assessment Recommendations instrumental evaluation not recommended at this time   General Therapy Interventions   Planned Therapy Interventions Dysphagia Treatment   Dysphagia treatment Modified diet education;Compensatory strategies for swallowing;Instruction of safe swallow strategies   Clinical Impression   Criteria for Skilled Therapeutic Interventions Met (SLP Eval) Yes,  treatment indicated   SLP Diagnosis moderate oropharyngeal dysphagia   Risks & Benefits of therapy have been explained evaluation/treatment results reviewed;care plan/treatment goals reviewed;risks/benefits reviewed;current/potential barriers reviewed;participants voiced agreement with care plan;participants included;patient   Clinical Impression Comments Clinical swallow eval completed per MD orders. Pt presents with moderate oropharyngeal dysphagia in the setting of reduced ELIZABETH/poor oral awareness. Pt did not follow directions for oral mech exam. No vocalizations noted despite max encouragement. Pt tolerated thin liquids and pureed textures with no overt s/sx of aspiration, however significant bolus holding noted which required max verbal cues to swallow. Regular solid textures resulted in insufficient mastication, bolus removed from oral cavity by SLP due to no attempts at mastication. Attempted whole pill in puree with RN present, which also was removed from oral cavity due to no attempt to swallow. Recommend pureed diet (4) and thin liquids (0) with 1:1 supervision. Please crush pills (if able) and serve in puree. Caregivers to assist pt with upright positioning for all PO, small sips/bites, and cue/verify each swallow response. Please monitor for pocketing. ST to continue to follow to assess diet tolerance and advancement as appropriate. Pt may require ST follow-up at discharge.   SLP Discharge Recommendation home with assist   SLP Rationale for DC Rec pt may require ongoing diet modifications at discharge   SLP Brief overview of current status  Recommend pureed diet (4) and thin liquids (0) with 1:1 supervision. Please crush pills (if able) and serve in puree. Caregivers to assist pt with upright positioning for all PO, small sips/bites, and cue/verify each swallow response. Please monitor for pocketing.   Total Session Time   Total Session Time (sum of timed and untimed services) 13

## 2023-05-21 NOTE — MEDICATION SCRIBE - ADMISSION MEDICATION HISTORY
Medication Scribe Admission Medication History    Admission medication history is complete. The information provided in this note is only as accurate as the sources available at the time of the update.    Medication reconciliation/reorder completed by provider prior to medication history? Yes    Information Source(s): Family member via phone    Pertinent Information:   Patient daughter reported patient is taking aspirin 81 mg chewable tablet every morning, notation in PTA list says prescription  on 22. Patient is not using chlorhexidine 0.12 % solution, per daughter patient is not taking keppra 500 mg tablet, patient is using insulin glargine 100 unit/ML pen at bedtime. Sliding scale for insulin lispro 100 unit/ML pen use of 9-12 units per day.     Changes made to PTA medication list:    Added: None    Deleted: chlorhexidine 0.12 % solution , keppra 500 mg tablet, aspirin 81 mg chewable tablet     Changed: methylcellulose powder from daily to as needed as reported by patient's daughter, insulin glargine 100 unit/ML from 18 units to 22 units per daughter.    Medication Affordability:  Not including over the counter (OTC) medications, was there a time in the past 3 months when you did not take your medications as prescribed because of cost?: No    Allergies reviewed with patient and updates made in EHR: yes    Medication History Completed By: Dasha Rodriges 2023 6:56 PM    Prior to Admission medications    Medication Sig Last Dose Taking? Auth Provider Long Term End Date   acetaminophen (TYLENOL) 500 MG tablet Take 500 mg by mouth every 6 hours as needed for mild pain Unknown at as needed Yes Reported, Patient     atorvastatin (LIPITOR) 40 MG tablet Take 1 tablet (40 mg) by mouth daily 2023 at night Yes Bony Castaneda MD Yes    blood glucose monitoring (NO BRAND SPECIFIED) meter device kit Use to test blood sugar 4 times daily.  Please provide glucose meter that is covered by insurance.  Unknown at unknown Yes Bony Castaneda MD     carvedilol (COREG) 12.5 MG tablet Take 1 tablet (12.5 mg) by mouth 2 times daily (with meals) 5/20/2023 at pm Yes Bony Castaneda MD Yes    cyanocobalamin (VITAMIN B-12) 1000 MCG tablet Take 1 tablet (1,000 mcg) by mouth daily 5/20/2023 at am Yes Bony Castaneda MD     diclofenac (VOLTAREN) 1 % topical gel Apply 2 g topically 4 times daily as needed for moderate pain Unknown at as needed Yes Bony Castaneda MD     doxazosin (CARDURA) 1 MG tablet Take 1 tablet (1 mg) by mouth At Bedtime 5/20/2023 at am Yes Bony Castaneda MD Yes    DULoxetine (CYMBALTA) 20 MG capsule Take 1 capsule (20 mg) by mouth daily 5/20/2023 at evening Yes Bony Castaneda MD Yes    gabapentin (NEURONTIN) 100 MG capsule Take 1 capsule (100 mg) by mouth At Bedtime 5/20/2023 at evening Yes Bony Castaneda MD Yes    insulin glargine (LANTUS PEN) 100 UNIT/ML pen Inject 22 Units Subcutaneous At Bedtime 5/19/2023 at bedtime Yes Reported, Patient No    insulin lispro (HUMALOG KWIKPEN) 100 UNIT/ML (1 unit dial) KWIKPEN Inject 1 unit glucose 200-250 2 units 251-300 3 units if above 300 USE 9-12 UNITS PER DAY Strength: 100 UNIT/ML Unknown at unknown Yes Bony Castaneda MD Yes    insulin pen needle (31G X 8 MM) 31G X 8 MM miscellaneous Use 4 pen needles daily or as directed. Unknown at unknown Yes Bony Castaneda MD     loratadine (CLARITIN) 10 mg tablet [LORATADINE (CLARITIN) 10 MG TABLET] Take 10 mg by mouth daily. 5/20/2023 at evening Yes Provider, Historical     melatonin 10 mg Tab Take 10 mg by mouth nightly as needed 5/20/2023 at bedtime Yes Provider, Historical     METHYLCELLULOSE, LAXATIVE, PO Take 1 Tablespoonful by mouth daily as needed Unknown at as needed Yes Reported, Patient     nystatin (MYCOSTATIN) 408757 UNIT/GM external cream Apply topically 2 times daily Until rash clear 5/20/2023 at pm Yes Bony Castaneda MD     SYNTHROID 75 MCG tablet Take 1  tablet (75 mcg) by mouth daily 5/20/2023 at am Yes Bony Castaneda MD Yes    TRUE METRIX BLOOD GLUCOSE TEST test strip USE TO TEST BLOOD SUGAR 4 TO 5 TIMES DAILY OR AS DIRECTED Unknown at unknown Yes Bony Castaneda MD     Vitamin D3 (CHOLECALCIFEROL) 25 mcg (1000 units) tablet Take 1 tablet (25 mcg) by mouth daily 5/20/2023 at am Yes Bony Castaneda MD     zinc oxide (DESITIN) 20 % external ointment Apply topically as needed for dry skin or irritation Unknown at as needed Yes Bony Castaneda MD     aspirin (ASA) 81 MG chewable tablet Take 1 tablet (81 mg) by mouth daily   Julio C Hay MD Yes 5/8/22

## 2023-05-22 ENCOUNTER — PRE VISIT (OUTPATIENT)
Dept: NEPHROLOGY | Facility: CLINIC | Age: 63
End: 2023-05-22
Payer: COMMERCIAL

## 2023-05-22 ENCOUNTER — APPOINTMENT (OUTPATIENT)
Dept: CARDIOLOGY | Facility: CLINIC | Age: 63
DRG: 065 | End: 2023-05-22
Payer: COMMERCIAL

## 2023-05-22 ENCOUNTER — APPOINTMENT (OUTPATIENT)
Dept: OCCUPATIONAL THERAPY | Facility: CLINIC | Age: 63
DRG: 065 | End: 2023-05-22
Payer: COMMERCIAL

## 2023-05-22 VITALS
DIASTOLIC BLOOD PRESSURE: 69 MMHG | HEART RATE: 85 BPM | OXYGEN SATURATION: 99 % | RESPIRATION RATE: 18 BRPM | TEMPERATURE: 97.7 F | SYSTOLIC BLOOD PRESSURE: 150 MMHG

## 2023-05-22 LAB
ANION GAP SERPL CALCULATED.3IONS-SCNC: 13 MMOL/L (ref 7–15)
ATRIAL RATE - MUSE: 74 BPM
BUN SERPL-MCNC: 38.3 MG/DL (ref 8–23)
CALCIUM SERPL-MCNC: 8.4 MG/DL (ref 8.8–10.2)
CHLORIDE SERPL-SCNC: 102 MMOL/L (ref 98–107)
CREAT SERPL-MCNC: 1.75 MG/DL (ref 0.51–0.95)
DEPRECATED HCO3 PLAS-SCNC: 19 MMOL/L (ref 22–29)
DIASTOLIC BLOOD PRESSURE - MUSE: NORMAL MMHG
GFR SERPL CREATININE-BSD FRML MDRD: 32 ML/MIN/1.73M2
GLUCOSE BLDC GLUCOMTR-MCNC: 213 MG/DL (ref 70–99)
GLUCOSE BLDC GLUCOMTR-MCNC: 224 MG/DL (ref 70–99)
GLUCOSE BLDC GLUCOMTR-MCNC: 230 MG/DL (ref 70–99)
GLUCOSE BLDC GLUCOMTR-MCNC: 243 MG/DL (ref 70–99)
GLUCOSE BLDC GLUCOMTR-MCNC: 302 MG/DL (ref 70–99)
GLUCOSE BLDC GLUCOMTR-MCNC: 404 MG/DL (ref 70–99)
GLUCOSE BLDC GLUCOMTR-MCNC: 453 MG/DL (ref 70–99)
GLUCOSE SERPL-MCNC: 496 MG/DL (ref 70–99)
INTERPRETATION ECG - MUSE: NORMAL
P AXIS - MUSE: 59 DEGREES
POTASSIUM SERPL-SCNC: 4.4 MMOL/L (ref 3.4–5.3)
PR INTERVAL - MUSE: 134 MS
QRS DURATION - MUSE: 86 MS
QT - MUSE: 402 MS
QTC - MUSE: 446 MS
R AXIS - MUSE: 36 DEGREES
SODIUM SERPL-SCNC: 134 MMOL/L (ref 136–145)
SYSTOLIC BLOOD PRESSURE - MUSE: NORMAL MMHG
T AXIS - MUSE: 26 DEGREES
VENTRICULAR RATE- MUSE: 74 BPM

## 2023-05-22 PROCEDURE — 258N000003 HC RX IP 258 OP 636: Performed by: STUDENT IN AN ORGANIZED HEALTH CARE EDUCATION/TRAINING PROGRAM

## 2023-05-22 PROCEDURE — 97535 SELF CARE MNGMENT TRAINING: CPT | Mod: GO | Performed by: OCCUPATIONAL THERAPIST

## 2023-05-22 PROCEDURE — 99239 HOSP IP/OBS DSCHRG MGMT >30: CPT | Mod: GC | Performed by: PSYCHIATRY & NEUROLOGY

## 2023-05-22 PROCEDURE — 93270 REMOTE 30 DAY ECG REV/REPORT: CPT

## 2023-05-22 PROCEDURE — 250N000013 HC RX MED GY IP 250 OP 250 PS 637: Performed by: STUDENT IN AN ORGANIZED HEALTH CARE EDUCATION/TRAINING PROGRAM

## 2023-05-22 PROCEDURE — 80048 BASIC METABOLIC PNL TOTAL CA: CPT | Performed by: STUDENT IN AN ORGANIZED HEALTH CARE EDUCATION/TRAINING PROGRAM

## 2023-05-22 PROCEDURE — 93272 ECG/REVIEW INTERPRET ONLY: CPT | Performed by: INTERNAL MEDICINE

## 2023-05-22 PROCEDURE — 36415 COLL VENOUS BLD VENIPUNCTURE: CPT | Performed by: STUDENT IN AN ORGANIZED HEALTH CARE EDUCATION/TRAINING PROGRAM

## 2023-05-22 PROCEDURE — 99223 1ST HOSP IP/OBS HIGH 75: CPT

## 2023-05-22 PROCEDURE — 250N000011 HC RX IP 250 OP 636

## 2023-05-22 RX ORDER — HEPARIN SODIUM 5000 [USP'U]/.5ML
5000 INJECTION, SOLUTION INTRAVENOUS; SUBCUTANEOUS EVERY 12 HOURS
Status: DISCONTINUED | OUTPATIENT
Start: 2023-05-22 | End: 2023-05-22 | Stop reason: HOSPADM

## 2023-05-22 RX ORDER — CLOPIDOGREL BISULFATE 75 MG/1
75 TABLET ORAL DAILY
Qty: 19 TABLET | Refills: 0 | Status: SHIPPED | OUTPATIENT
Start: 2023-05-23 | End: 2023-05-22

## 2023-05-22 RX ORDER — LABETALOL HYDROCHLORIDE 5 MG/ML
10-20 INJECTION, SOLUTION INTRAVENOUS EVERY 10 MIN PRN
Status: DISCONTINUED | OUTPATIENT
Start: 2023-05-22 | End: 2023-05-22 | Stop reason: HOSPADM

## 2023-05-22 RX ORDER — HYDRALAZINE HYDROCHLORIDE 20 MG/ML
10-20 INJECTION INTRAMUSCULAR; INTRAVENOUS
Status: DISCONTINUED | OUTPATIENT
Start: 2023-05-22 | End: 2023-05-22 | Stop reason: HOSPADM

## 2023-05-22 RX ORDER — ASPIRIN 81 MG/1
81 TABLET, CHEWABLE ORAL DAILY
Qty: 90 TABLET | Refills: 3 | Status: SHIPPED | OUTPATIENT
Start: 2023-05-22 | End: 2023-09-14

## 2023-05-22 RX ORDER — LEVETIRACETAM 500 MG/1
500 TABLET ORAL 2 TIMES DAILY
Qty: 180 TABLET | Refills: 3 | Status: SHIPPED | OUTPATIENT
Start: 2023-05-22 | End: 2023-09-14

## 2023-05-22 RX ORDER — SULFAMETHOXAZOLE/TRIMETHOPRIM 800-160 MG
1 TABLET ORAL 2 TIMES DAILY
Qty: 10 TABLET | Refills: 0 | Status: SHIPPED | OUTPATIENT
Start: 2023-05-23 | End: 2023-05-22

## 2023-05-22 RX ORDER — SULFAMETHOXAZOLE/TRIMETHOPRIM 800-160 MG
1 TABLET ORAL 2 TIMES DAILY
Qty: 10 TABLET | Refills: 0 | Status: ON HOLD | OUTPATIENT
Start: 2023-05-23 | End: 2023-06-15

## 2023-05-22 RX ORDER — CLOPIDOGREL BISULFATE 75 MG/1
75 TABLET ORAL DAILY
Qty: 19 TABLET | Refills: 0 | Status: ON HOLD | OUTPATIENT
Start: 2023-05-23 | End: 2023-09-06

## 2023-05-22 RX ADMIN — DULOXETINE HYDROCHLORIDE 20 MG: 20 CAPSULE, DELAYED RELEASE ORAL at 07:40

## 2023-05-22 RX ADMIN — Medication 25 MCG: at 07:40

## 2023-05-22 RX ADMIN — CARVEDILOL 12.5 MG: 12.5 TABLET, FILM COATED ORAL at 17:15

## 2023-05-22 RX ADMIN — CEFTRIAXONE SODIUM 1 G: 1 INJECTION, POWDER, FOR SOLUTION INTRAMUSCULAR; INTRAVENOUS at 12:58

## 2023-05-22 RX ADMIN — ASPIRIN 81 MG CHEWABLE TABLET 81 MG: 81 TABLET CHEWABLE at 07:40

## 2023-05-22 RX ADMIN — CARVEDILOL 12.5 MG: 12.5 TABLET, FILM COATED ORAL at 07:41

## 2023-05-22 RX ADMIN — CLOPIDOGREL BISULFATE 75 MG: 75 TABLET ORAL at 07:41

## 2023-05-22 RX ADMIN — LEVETIRACETAM 500 MG: 500 TABLET, FILM COATED ORAL at 07:40

## 2023-05-22 RX ADMIN — ATORVASTATIN CALCIUM 40 MG: 40 TABLET, FILM COATED ORAL at 07:40

## 2023-05-22 RX ADMIN — SODIUM CHLORIDE 1000 ML: 9 INJECTION, SOLUTION INTRAVENOUS at 04:36

## 2023-05-22 RX ADMIN — CYANOCOBALAMIN TAB 1000 MCG 1000 MCG: 1000 TAB at 07:40

## 2023-05-22 RX ADMIN — LEVOTHYROXINE SODIUM 75 MCG: 75 TABLET ORAL at 07:40

## 2023-05-22 ASSESSMENT — ACTIVITIES OF DAILY LIVING (ADL)
ADLS_ACUITY_SCORE: 47
DEPENDENT_IADLS:: CLEANING;COOKING;LAUNDRY;SHOPPING;MEAL PREPARATION;MEDICATION MANAGEMENT;TRANSPORTATION;MONEY MANAGEMENT;INCONTINENCE
ADLS_ACUITY_SCORE: 47
ADLS_ACUITY_SCORE: 47
ADLS_ACUITY_SCORE: 45
ADLS_ACUITY_SCORE: 47

## 2023-05-22 ASSESSMENT — VISUAL ACUITY
OU: NORMAL ACUITY
OU: NORMAL ACUITY

## 2023-05-22 NOTE — PLAN OF CARE
Admitted 5/20 with seizure like activity, MRI notable for multifocal stroke of R paramedian chuck and punctate R MCA territory stroke. Also has UTI  Vitals: VSS on RA  Neuros: Disoriented to situation and time, soft/quiet speech. 4/5 throughout, denies N/T  IV: PIV SL  Labs/Electrolytes: BG check ACHS  Diet: Pureed with thins, 1:1 supervision  Bowel status: LBM PTA  : Incontinent, purewick in place   Pain: Denies  Activity: Not OOB this shift. Up with walker per report    Arrived from: ED    Belongings/meds: Clothing, remains with patient   2 RN Skin Assessment Completed by: Corina HENDRICKSON  Non-intact findings documented (yes/no/NA): No deficits noted

## 2023-05-22 NOTE — PLAN OF CARE
Status: Pt. with a history of epilepsy, T2DM, recurrent UTI, CKD, vascular vs Alzheimer's dementia, and previous stroke who presents for evaluation of encephalopathy  Vitals: VSS on RA, HTN within  220 parameters. On CCM.   Neuros: A&Ox3-4 with  Choices. Quiet slow speech with mild dysarthria and WFD at times. Flat affect. 5/5 throughout with generalized weakness. NIHSS 2  IV: PIV, SL. 1,000mL NS bolus given this shift   Labs/Electrolytes: BG elevated, trending down. Na 133.   Resp/trach: WNL on RA. LS clear  Diet: Pureed diet with thins and 1:1 supervision. Takes pills crushed with pudding   Bowel status: No BM this shift, LBM PTA. Fecal incontinence.   : Incontinent. Purewick in place   Skin: WNL   Pain: Denies   Activity: Not yet OOB. Rolling from side to side in bed independently. Walker ordered   Plan: Start Keppra 500 mg BID. PT/OT/Speech recommending home with therapies. Continue to monitor and follow POC     Stroke Patients:   PLC scheduled or completed: Ordered, not scheduled at this time  Pneumoboots in place:  Ordered

## 2023-05-22 NOTE — DISCHARGE SUMMARY
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  -Patient should follow-up with general neurology in 4 to 6 weeks.  -A referral was made to cardiology to have a cardiac CTA done as an outpatient.  This is for ruling out cardioembolic causes, and confirming etiology of seizures.  -She is being discharged on aspirin 81 mg and Plavix 75 mg for 3 weeks until 6/11/2023.  This is to be followed with aspirin 81 mg indefinitely, unless a cardio embolic source is identified on the monitor or the CTA, in which case she would need anticoagulation (the basal ganglia hemorrhage is not an absolute contraindication).  -PT and OT recommended home care, so referral was put in.  -Patient should follow-up with PCP in 1 week for optimal control of her risk factors.  -Patient should follow-up with endocrinology at [facility] endocrinology for her diabetes.  -Patient is also being discharged with oral Bactrim for her UTI.  We are still waiting for sensitivities, if it is found that she is resistant to Bactrim, then we will switch the antibiotic and give her a call.  Current plan is for 5 days of treatment with Bactrim.  She should follow-up with PCP for this.    Problem Leading to Hospitalization (from HPI):   \"Isabell Matthews is a 62 year old female with a history of " "epilepsy, T2DM, recurrent UTI, CKD, vascular vs Alzheimer's dementia, and previous stroke who presents for evaluation of seizure like activity. History is limited to chart review, as attempts to reach daughter were unsuccessful. The patient was reportedly at home with her daughter today when she had an episode of seizure like activity. No description of the event is available, but per EMS on arrival the patient was at her baseline. The patient was brought in for evaluation and initially complained of a mild HA. She was found to have a UTI and was treated with ceftriaxone. She underwent a MRI brain and required Haldol 2 mg for procedural sedation. This MRI was notable for multifocal stroke of right paramedian chuck and punctate right MCA territory stroke. The patient reportedly was not complaining of any new symptoms.     On evaluation, the patient is not answering any questions with minimal attempts to speak despite repeated prompting. She is able to write her name and nod and shake her head to some questions and does indicate that she has a history of seizure and shrugs when asked if had a seizure today. Has had a history of non-compliance in the past but unable to assess compliance given limited history.\"    On 5/21, further history was obtained by asking the daughter. In terms of her baseline, it seems that patient can use a walker to ambulate short distances from 1 room to the other, but needs a wheelchair if it is longer.  She is able to hold a conversation, but has slowed thought process and replies.  She has had only 1 seizure-like activity, a witnessed gaze deviation and shaking of all extremities back in December.  She did not have a seizure-like activity prior to this admission.    Please see H&P dated 5/20/2023 for further details about presentation.    Brief Hospital Course:    62 year old female with a history of epilepsy, T2DM, recurrent UTI, CKD, vascular vs Alzheimer's dementia, and previous stroke " who presents for evaluation of encephalopathy.  Initial EMS report was for concerns for seizure-like activity, however, on confirmation with daughter, daughter says that she called EMS because patient was noted to be more confused since Tuesday, and especially yesterday was not really able to use her walker, and so daughter was worried that she might have a UTI that has required hospitalizations in the past.Imaging with multifocal stroke in 2 vascular territories, and the right centrum semiovale and right chuck.       On chart review, it also appears that she had a left basal ganglia hemorrhagic infarct 2 years ago, MRI at that time also showed multiple deep microhemorrhages of. 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  On 5/23, it appears that she is at baseline.  She is able to perform her ADLs in her hospital with standby assist, which daughter had mentioned is her baseline.  Her work-up here is complete, although, to rule out cardioembolic etiology (before confirming it as ESUS), we would recommend a 30-day heart monitor, as well as a cardiac CTA as outpatient (this is more sensitive than a MARY ALICE, and we are currently hesitant because of her EVON).    PT and OT recommended home care, referral was placed.    Rehab evaluation: OT, PT and SLP.     Smoking Cessation: patient is not a smoker    BP Long-term Goal: 130/80    Antithrombotic/Anticoagulant Agent: Aspirin 81 mg and Plavix 75 mg for 3 weeks.  To be followed with aspirin 81 mg indefinitely, unless evidence of cardioembolic source is found with 30-day monitor or cardiac CTA.    Statins: Cont PTA Atorvastatin 40mg       Hgb A1C Goal: < 7.0    Complications: UTI.     Other problems addressed during this hospitalization:  #Possible seizure like activity in the past  #History of possible epilepsy  Med rec says that she should be on Keppra 500 mg twice daily, but on asking daughter, daughter says that she is not  on this medication.  Per chart review, she had 1 episode of left forced gaze deviation, with stiffness/rigidity in December 2022, for which she was admitted to general neurology, and started on Keppra.  24-hour EEG at that time demonstrated no electrographic seizures or epileptiform discharges.  Per daughter, this is the only seizure-like episode she had, and current admission is not for seizure-like activity.  - Started Keppra 500 mg BID  - Keppra level  - Seizure precautions     #UTI  #Hx of recurrent UTIs  Has had 3-4 UTIs in the past year per daughter.  UA with pyuria.  Clinically somnolent, which could be related to the stroke, but UTI can also cause encephalopathy, so we will treat with antibiotics.  - Ceftriaxone 1g Qday, received 2 doses.  From 5/23, switching to Bactrim p.o. twice daily for 5 days.  -Waiting for cultures-so far positive for gram-positive bacilli, sensitivities have not come back.     #DM, Brittle diabetes  -Per chart review, it appears that she has brittle diabetes.  Recent notes that mention type 2 diabetes, but have also noticed some notes that mention type 1 diabetes, so it is unclear. She has had multiple episodes in the hospital in the past, but her glucose would drop down to 15, and she would become unresponsive.  -Endocrinology consulted, appreciate recs (5/22).               -Lantus 22 units-- decreased to 18 units given CKD (worsening egfr) and adding of CHO coverage-- with treated infection needs will likely decrease- patient known to have labile BG with severe hypoglycemia              -Novolog 1:18 CHO coverage with meals/snacks/supplements               -Novolog high sliding scale AC/HS-- decreased to medium-- changed future to correct when greater than 170 during day given history of severe low BG               -BG monitoring TID AC, HS, 0200              -hypoglycemia protocol              -recommend carb consistent diet with carb counting protocol              -diabetes  education needs will be assessed closer to discharge  -On discharge, would need follow-up with [facility] endocrinology service.     #EVON on CKD  Baseline creatinine of 1.3-1.4.  Creatinine today 1.7, with high BUN: Creatinine ratio, likely prerenal especially given patient was not drinking much in the ED.  -S/p 1 L fluids on 5/21.  -Encouraged p.o. intake.  - Daily BMP     #Dementia  -  2/2 Vascular vs Alzheimer's.  On her 2021 MRI, no signs suggestive of CVA.  She did have microhemorrhages on SWI, but all of these were deep.      PERTINENT INVESTIGATIONS    Labs  Lipid Panel: Recent Labs   Lab Test 05/20/23  1312   CHOL 136   HDL 35*   LDL 71   TRIG 152*     A1C:   Lab Results   Component Value Date    A1C 10.8 05/20/2023    A1C 7.8 06/21/2021     INR: No lab results found in last 7 days.   Coag Panel / Hypercoag Workup: Not indicated  Pending test results: none    Echo:   5/20 TTE  Left ventricular size, wall motion and function are normal. The ejection  fraction is 55-60%.  Global right ventricular function is normal.  Small circumferential pericardial effusion is present without any hemodynamic  significance.  Chamber compression is not present; there is no evidence for tamponade.  Both atria appear normal.    Imaging:      MRI/Head CT MRI Brain 5/20  IMPRESSION:  1. Acute infarcts within the right paramedian chuck and right  parasagittal frontal lobe.  2. No abnormal signal within the medial temporal lobes. Moderate  temporal predominant volume loss and chronic small vessel ischemic  disease.  3. Contrast portion of the exam was unable to be completed due to  patient intolerance.         Intracranial Vasculature Head CTA demonstrates focal severe stenosis at the right M1/M2  junction. Mild stenosis at the left M1 division M2 junction.    *On personal review, there is mild to moderate atherosclerosis of bilateral ICA.   Cervical Vasculature Neck CTA demonstrates no stenosis of the major cervical arteries.          Endovascular procedure: None     Cardiac Monitoring: Patient had > 24 hrs of cardiac monitor while in hospital.    Findings: No atrial fibrillation was found.  Patient is being discharged with 30-day cardiac monitor.    Sleep Apnea Screen:   Questions/Answers -asked daughter, as patient was unable to answer.    1. Prior to your stroke, have you been told that you snore? No.    2. Prior to your stroke, have you been told that you struggle to breath while you are sleeping? No.    3. Prior to your stroke, do you feel tired and sleepy even after getting a normal night of sleep? No.    Sleep Apnea Screen Findings: Patient has 0-1 symptoms of sleep apnea.  Further sleep study is not recommended at this time.    PHQ-9 Depression Screen Score: Unable to assess due to severe dementia, patient could not accurately describe symptoms.     Education discussed with: patient and child on blood pressure management, cholesterol management, medical management and follow-up recommendations/plan.    During daily rounds, the plan of care was discussed and developed with patient and child.  Plan of care includes: the above.    PHYSICAL EXAMINATION  Vital Signs:  B/P: 150/69, T: 97.7, P: 85, R: 18    General: Awake and alert in NAD   HEENT: Normocephalic, atraumatic, no epistaxis, no oral lesions  Resp: Breathing comfortably on room air  CV: Extremities warm and well perfused  Abdomen: +BS, Soft, non-distended, non-tender  Extremities: Warm and well perfused, peripheral pulses present, no edema  Skin: Not jaundiced, no rash, no ecchymoses  Psych: Normal mood and affect     Neuro:  Mental status: Awake, alert, attentive;  she is able to answer questions, but after a small delay.  On prompting, she is able to tell where she is, she can choose the month of May when offered options, she can tell her age.  She can follow simple commands as well as two-step commands.  Cranial nerves: Visual fields intact, Eyes conjugate, EOMI w/ normal  and smooth pursuit, face expression symmetric, hearing intact to conversation, shoulder shrug strong, does not open mouth or stick out tongue to command  Motor: Tone normal.  At least antigravity in all extremities without any drift.  When we asked her to roll her index fingers around each other, the right finger was rolling more than the left, suggestive of perhaps subtle left-sided hemiparesis.  No abnormal movements noted. Pronator drift absent.   Reflexes: No clonus. Hyperreflexic on patellars (3+).   Sensory: Intact to light touch in all 4 extremities  Coordination: FNF intact bilaterally with no dysmetria; Normal heel-shin test bilaterally with no dysmetria noted  Gait: Deferred    National Institutes of Health Stroke Scale (on day of discharge)  NIHSS Total Score: 2    Modified [place] Score (Discharge)   3    Medications    Discharge Medication List as of 5/22/2023  6:36 PM      START taking these medications    Details   clopidogrel (PLAVIX) 75 MG tablet 1 tablet (75 mg) by Oral or NG Tube route daily for 19 days, Disp-19 tablet, R-0, Local Print      sulfamethoxazole-trimethoprim (BACTRIM DS) 800-160 MG tablet Take 1 tablet by mouth 2 times daily, Disp-10 tablet, R-0, Local Print         CONTINUE these medications which have CHANGED    Details   aspirin (ASA) 81 MG chewable tablet Take 1 tablet (81 mg) by mouth daily, Disp-90 tablet, R-3, Local Print      levETIRAcetam (KEPPRA) 500 MG tablet Take 1 tablet (500 mg) by mouth 2 times daily, Disp-180 tablet, R-3, Local Print         CONTINUE these medications which have NOT CHANGED    Details   acetaminophen (TYLENOL) 500 MG tablet Take 500 mg by mouth every 6 hours as needed for mild pain, Historical      atorvastatin (LIPITOR) 40 MG tablet Take 1 tablet (40 mg) by mouth daily, Disp-90 tablet, R-3, E-Prescribe      blood glucose monitoring (NO BRAND SPECIFIED) meter device kit Use to test blood sugar 4 times daily.  Please provide glucose meter that is covered  by insurance.Disp-1 kit, [id number]      carvedilol (COREG) 12.5 MG tablet Take 1 tablet (12.5 mg) by mouth 2 times daily (with meals), Disp-180 tablet, R-3, E-Prescribe      cyanocobalamin (VITAMIN B-12) 1000 MCG tablet Take 1 tablet (1,000 mcg) by mouth daily, Disp-100 tablet, R-3, E-Prescribe      diclofenac (VOLTAREN) 1 % topical gel Apply 2 g topically 4 times daily as needed for moderate pain, Disp-150 g, R-1, E-Prescribe      doxazosin (CARDURA) 1 MG tablet Take 1 tablet (1 mg) by mouth At Bedtime, Disp-90 tablet, R-3, E-Prescribe      DULoxetine (CYMBALTA) 20 MG capsule Take 1 capsule (20 mg) by mouth daily, Disp-90 capsule, R-3, E-Prescribe      gabapentin (NEURONTIN) 100 MG capsule Take 1 capsule (100 mg) by mouth At Bedtime, Disp-90 capsule, R-1, E-Prescribe      insulin glargine (LANTUS PEN) 100 UNIT/ML pen Inject 22 Units Subcutaneous At Bedtime, Historical      insulin lispro (HUMALOG KWIKPEN) 100 UNIT/ML (1 unit dial) KWIKPEN Inject 1 unit glucose 200-250 2 units 251-300 3 units if above 300 USE 9-12 UNITS PER DAY Strength: 100 UNIT/ML, Disp-15 mL, R-1, E-Prescribe      insulin pen needle (31G X 8 MM) 31G X 8 MM miscellaneous Use 4 pen needles daily or as directed.Disp-300 each, [id number]      loratadine (CLARITIN) 10 mg tablet [LORATADINE (CLARITIN) 10 MG TABLET] Take 10 mg by mouth daily., Historical      melatonin 10 mg Tab Take 10 mg by mouth nightly as needed, Historical      METHYLCELLULOSE, LAXATIVE, PO Take 1 Tablespoonful by mouth daily as needed, Historical      nystatin (MYCOSTATIN) 492755 UNIT/GM external cream Apply topically 2 times daily Until rash clearDisp-30 g, [id number]      SYNTHROID 75 MCG tablet Take 1 tablet (75 mcg) by mouth daily, Disp-90 tablet, R-3, RAMÍREZ, E-PrescribeDiscontinue previous dose      TRUE METRIX BLOOD GLUCOSE TEST test strip USE TO TEST BLOOD SUGAR 4 TO 5 TIMES DAILY OR AS DIRECTED, Disp-400 strip, R-3, E-Prescribe      Vitamin D3 (CHOLECALCIFEROL)  25 mcg (1000 units) tablet Take 1 tablet (25 mcg) by mouth daily, Disp-100 tablet, R-3, E-Prescribe      zinc oxide (DESITIN) 20 % external ointment Apply topically as needed for dry skin or irritationDisp-85 g, [id number]             Additional recommendations and follow up:       Home Care Referral      Adult Cardiology Eval  Referral      Adult Endocrinology  Referral      Reason for your hospital stay    Confusion and alteration of mental status due to Urinary Tract Infection and Acute Kidney Injury. During your evaluation, it was found that you had some new Right hemisphere strokes.     Activity    Your activity upon discharge: activity as tolerated     Follow Up ([facility])    Follow up with primary care provider, Bony Castaneda, within 7 days to evaluate treatment effect of UTI and for hospital follow- up.  No follow up labs or test are needed.    Follow up with General Neurology Clinic, at (location with clinic name or city) [facility], within 4-6 weeks  for hospital follow- up and regarding new diagnosis of Right hemispheric strokes. No follow up labs or test are needed.    Appointments on [place] and/or [facility] (with [facility] or [facility] provider or service). Call [phone] if you haven't heard regarding these appointments within 7 days of discharge.     Diet    Follow this diet upon discharge: Orders Placed This Encounter      Pureed Diet (level 4) Thin Liquids (level 0) (with 1:1 supervision)     Stroke Hospital Follow Up (for neurologist use only)    [employer] will call you to coordinate care as prescribed by your provider. If you don t hear from a representative within 2 business days, please call [phone].       Plan at discharge:  -Patient is being discharged on a 30-day cardiac monitor.  -Patient should follow-up with general neurology in 4 to 6 weeks.  -A referral was made to cardiology to have a cardiac CTA done as an outpatient.  This is for ruling out  cardioembolic causes, and confirming etiology of seizures.  -She is being discharged on aspirin 81 mg and Plavix 75 mg for 3 weeks until 6/11/2023.  This is to be followed with aspirin 81 mg indefinitely, unless a cardio embolic source is identified on the monitor or the CTA, in which case she would need anticoagulation (the basal ganglia hemorrhage is not an absolute contraindication).  -PT and OT recommended home care, so referral was put in.  -Patient should follow-up with PCP in 1 week for optimal control of her risk factors.  -Patient should follow-up with endocrinology at [facility] endocrinology for her diabetes.    Patient was seen and discussed with the Attending, Dr. Macias.    TYLER Nur  PGY1 Resident  Stroke [facility] *[id number]

## 2023-05-22 NOTE — CONSULTS
"NEW INPATIENT DIABETES MANAGEMENT CONSULT  Isabell Matthews  Age: 62 year old  MRN # [id number]   YOB: 1960    Chief Complaint: hyperglycemia  Reason for Consult: \"hyperglycemia in patient with h/o brittle DM (A1c = 10.8%)\"  Consulting Provider: Chidi Ruiz MD     History of Present Illness:   roxane Matthews is a 62 year old female with a history of epilepsy, T2DM, recurrent UTI, CKD, vascular vs Alzheimer's dementia, and previous stroke who presents for evaluation of seizure like activity and subsequent imaging showed new infact. Patient also found to have a UTI.  History is limited to chart review, attempted to reache daughter X3 without success.     Pt is known to the Inpatient Diabetes Service from past admission(s).    History obtained via the patient, chart review, and discussion with consulting team.    Diabetes Focus:   Per notes/chart review- patient is a type 2 diabetic with a history of severe hypoglycemia.     According to patient's last MD visit she was takeing 18 units of lantus along with sliding scale (treating >200).     Isabell has baseline dementia per primary team and is unable to recall specifics of her insulin regimen.     Interval History:   BG labile inpatient. BG in 500's yesterday in the presence of held lantus- lantus 22 units given. Unclear if 213 reading is fasting. Started CHO coverage at 1:18 this afternoon.       Currently, denies nausea, vomiting, pain or diarrhea.       Most recent PTA diabetes regimen includes: doses need confirmation   Lantus 18 units once daily  Novolog sliding scale   200-250: 1 unit   251-300: 2 units   300 +: 3 units    BG monitor: fingerstick  Frequency of checks: unknown times daily      BG upon this admission: 117   Renal function: Creatinine (1.75), eGFR (32)  BMP: Bicarb (19), Anion Gap (13)  Receiving steroids: none  Pt is eating- minimally.   Pt is not receiving dextrose IVF      Other Active/Contributory Medical Problems: Acute CVA, CKD, UTI, " "Dementia  Diabetes Mellitus Type: per last consult note: Insulin dependent Type 2 diabetes  -\"She presented  overnight on April 3, 2011, due to increasing nausea, vomiting, and was found to be in diabetic ketoacidosis. She was admitted to the intensive care unit, put on an insulin drip, and was followed by Dr. Parry, endocrinology. After the resolution of her ketoacidosis, she was started on an insulin regimen with Lantus and NovoLog and was told  that she has type 1 diabetes and will always require insulin\"  Duration:  24  Diabetic Complications: peripheral neuropathy, nephropathy  PTA Diet: unknown  Usual BG control PTA:  subopitmal control, with A1c 10.8% on 2023  History of DKA: yes  Able to Detect Hypoglycemia: Impaired due to dementia   Last dilated eye exam: unknown  Usual Diabetes Care Provider: Dr. Castaneda-   Primary Care Provider: Bony Castaneda  Current Diet: Orders Placed This Encounter      Pureed Diet (level 4) Thin Liquids (level 0) (with 1:1 supervision)       10 point ROS completed with pertinent positives and negatives noted in the HPI  Past medical, family and social histories are reviewed and updated.    Social History  Social History     Socioeconomic History     Marital status:    Tobacco Use     Smoking status: Former     Packs/day: 0.50     Years: 35.00     Pack years: 17.50     Types: Cigarettes     Quit date: 2018     Years since quittin.8     Smokeless tobacco: Never   Substance and Sexual Activity     Alcohol use: Not Currently     Drug use: Not Currently     Sexual activity: Not Currently   Social History Narrative    ** Merged History Encounter **         Recently moved to [facility] with Daughter Charli who is her pca        Tobacco:former user    Alcohol:not currently    Marital Status:     Place of Residence: [place], [place]    Occupation: n/a    Family History   patient unable to recall     Physical Exam   BP (!) 183/81 (BP Location: Left arm)   " Pulse 77   Temp 98  F (36.7  C) (Oral)   Resp 16   SpO2 99%   Constitutional: pleasant, in no distress.   HEENT: normocephalic, atraumatic. Oral mucous membranes moist.   Lungs: unlabored respiration, no cough  ABD: rounded, nondistended  Skin: warm and dry, no obvious lesions  MSK:  moves all extremities  Lymp:  no LE edema   Mental status:   oriented to self- drowsy  Psych:   calm, forgetful    Most Recent Laboratory Tests:  Recent Labs   Lab 05/21/23  0627   HGB 10.7*     Recent Labs   Lab 05/20/23  1312   A1C 10.8*     Recent Labs   Lab 05/22/23  0007   CR 1.75*     Recent Labs   Lab 05/22/23  0754 05/22/23  0410 05/22/23  0136 05/22/23  0007 05/21/23  2353 05/21/23  2314   * 302* 404* 496* 453* 530*       Assessment:    1.  Type 2 insulin dependent, sub optimally controlled, HgbA1c=10.8  2. CKD  3. Anemia  4. Stress/infection induced hyperglycemia    Plan:    -Lantus 22 units-- decreased to 18 units given CKD (worsening egfr) and adding of CHO coverage-- with treated infection needs will likely decrease- patient known to have labile BG with severe hypoglycemia   -Novolog 1:18 CHO coverage with meals/snacks/supplements    -Novolog high sliding scale AC/HS-- decreased to medium-- changed future to correct when greater than 170 during day given history of severe low BG    -BG monitoring TID AC, HS, 0200   -hypoglycemia protocol   -recommend carb consistent diet with carb counting protocol   -diabetes education needs will be assessed closer to discharge   -on discharge, will recommend outpatient follow up with [facility] Endocrinology service     Discussed plan of care with patient, nursing, and primary team   Thank you for this consult; Inpatient Diabetes will continue to follow.         Contacting the Inpatient Diabetes Team   From 7AM-5PM: page inpatient diabetes provider that is following the patient, or utilize the job code paging system.   From 5PM-7AM: page the job code for endocrine fellow on call.        Please use the following job code to reach the Inpatient Diabetes team. Note that you must use an in house phone and that job codes cannot receive text pages.     Dial [phone] (or star-star-star [phone] on the new [employer] telephones), then [phone] to reach the endocrine-diabetes provider on call.    I spent a total of 75 minutes face to face or coordinating care of Isabell R Matthews.             Over 50% of my time on the unit was spent counseling the patient and/or coordinating care regarding acute hyperglycemia management.  See note for details.    PEGGY Sanchez, CNP  Inpatient Diabetes Service  Pager: [phone]

## 2023-05-22 NOTE — PLAN OF CARE
Status: Pt. with a history of epilepsy, T2DM, recurrent UTI, CKD, vascular vs Alzheimer's dementia, and previous stroke who presents for evaluation of encephalopathy  Vitals: VSS on RA, HTN within  220 parameters. On CCM.   Neuros: A&Ox3-4 with  Choices. Quiet slow speech with mild dysarthria and WFD at times. Flat affect. 5/5 throughout with generalized weakness. NIHSS 2  IV: PIV, SL  Labs/Electrolytes: BG elevated normalizing since yesterday.   Resp/trach: WNL on RA. LS clear  Diet: Pureed diet with thins and spot checks, pt was safe but takes a while to swallow independently. Takes pills crushed with pudding   Bowel status: No BM this shift, LBM PTA. Fecal incontinence.   : Incontinent. Pad in place  Skin: WNL   Pain: Denies   Activity:up with 1, GB, walker to pivot  Plan: discharge home with therapies when daughter arrives and watches her eat. RN to call [phone] or pager [phone] for cardiac monitor hookup when discharge finalizes.

## 2023-05-22 NOTE — PROGRESS NOTES
"      [facility]    Stroke Progress Note    Interval EventsOvernight, patient was noted to have a high blood glucose of 500, she was given her home glargine, and glucose subsequently went down and has been in the 200s all day.    Her mentation has improved today.  Patient is able to respond, after a delay, to most of the questions.    HPI Summary  \"Isabell Matthews is a 62 year old female with a history of epilepsy, T2DM, recurrent UTI, CKD, vascular vs Alzheimer's dementia, and previous stroke who presents for evaluation of seizure like activity. History is limited to chart review, as attempts to reach daughter were unsuccessful. The patient was reportedly at home with her daughter today when she had an episode of seizure like activity. No description of the event is available, but per EMS on arrival the patient was at her baseline. The patient was brought in for evaluation and initially complained of a mild HA. She was found to have a UTI and was treated with ceftriaxone. She underwent a MRI brain and required Haldol 2 mg for procedural sedation. This MRI was notable for multifocal stroke of right paramedian chuck and punctate right MCA territory stroke. The patient reportedly was not complaining of any new symptoms.     On evaluation, the patient is not answering any questions with minimal attempts to speak despite repeated prompting. She is able to write her name and nod and shake her head to some questions and does indicate that she has a history of seizure and shrugs when asked if had a seizure today. Has had a history of non-compliance in the past but unable to assess compliance given limited history.\"    On 5/21, further history was obtained by asking the daughter. In terms of her baseline, it seems that patient can use a walker to ambulate short distances from 1 room to the other, but needs a wheelchair if it is longer.  She is able to hold a conversation, but has " slowed thought process and replies.    Stroke Evaluation Summarized    MRI/Head CT MRI Brain 5/20  IMPRESSION:  1. Acute infarcts within the right paramedian chuck and right  parasagittal frontal lobe.  2. No abnormal signal within the medial temporal lobes. Moderate  temporal predominant volume loss and chronic small vessel ischemic  disease.  3. Contrast portion of the exam was unable to be completed due to  patient intolerance.       Intracranial Vasculature Head CTA demonstrates focal severe stenosis at the right M1/M2  junction. Mild stenosis at the left M1 division M2 junction.   Cervical Vasculature Neck CTA demonstrates no stenosis of the major cervical arteries.     Echocardiogram Left ventricular size, wall motion and function are normal. The ejection  fraction is 55-60%.  Global right ventricular function is normal.  Small circumferential pericardial effusion is present without any hemodynamic  significance.  Chamber compression is not present; there is no evidence for tamponade.   EKG/Telemetry Sinus rhythm   Other Testing Not Applicable     LDL  5/20/2023: 71 mg/dL   A1C  3/29/2023: 11.2 %  5/20/2023: 10.8 %   Troponin 5/20/2023: 0.02 ng/mL       Impression    62 year old female with a history of epilepsy, T2DM, recurrent UTI, CKD, vascular vs Alzheimer's dementia, and previous stroke who presents for evaluation of encephalopathy.  Initial EMS report was for concerns for seizure-like activity, however, on confirmation with daughter, daughter says that she called EMS because patient was noted to be more confused since Tuesday, and especially yesterday was not really able to use her walker, and so daughter was worried that she might have a UTI that has required hospitalizations in the past.Imaging with multifocal stroke in 2 vascular territories, and the right centrum semiovale and right chuck.      On chart review, it also appears that she had a left basal ganglia hemorrhagic infarct 2 years ago. At this  stage, given that the strokes are in two different territories etiology is likely ESUS.  On 5/23, it appears that she is at baseline.  She is able to perform her ADLs in her hospital with standby assist, which daughter had mentioned is her baseline.  Her work-up here is complete, although, to rule out cardioembolic etiology (before confirming it as ESUS), we would recommend a 30-day heart monitor, as well as a cardiac CTA as outpatient (this is more sensitive than a MARY ALICE, and we are currently hesitant because of her EVON).  Daughter plans to come here in the evening, and reassess if she feels comfortable taking Isabell home.  If she feels as such, then we can discharge her home with appropriate PT/OT follow-up, otherwise, we can keep her tonight, and discharge possibly tomorrow.     Plan  #Multifocal acute stroke (right centrum semiovale, right chuck)  #History of stroke  - Neurochecks Q 4 hours  - Daily aspirin 81 mg for secondary stroke prevention  - Plavix (clopidogrel) 300 mg PO loading dose x 1 5/20  - Plavix (clopidogrel) 75 mg PO Daily until 6/12.  -LDL 71, A1c 10.8.  - Statin: PTA atrovastatin 40 mg, consider increasing to 80 mg.  - TTE with Bubble Study-did not show any evidence of thrombus, wall motion abnormalities, atrial look normal.  -Plan for 30-day cardiac monitor on discharge, as well as outpatient cardiac CTA to rule out cardioembolic source.  -Other routine stroke orders:  - Telemetry, EKG  - Bedside Glucose Monitoring  - Nutrition: Regular diet, passed swallow  - A1c, Lipid Panel, Troponin x 3 (initially elevated, but down trended)  - PT/OT/SLP  - Stroke Education  - Depression Screen  - Apnea Screen  - Euthermia, Euglycemia      #Possible seizure like activity in the past  #History of possible epilepsy  Med rec says that she should be on Keppra 500 mg twice daily, but on asking daughter, daughter says that she is not on this medication.  Per chart review, she had 1 episode of left forced gaze  deviation, with stiffness/rigidity in December 2022, for which she was admitted to general neurology, and started on Keppra.  24-hour EEG at that time demonstrated no electrographic seizures or epileptiform discharges.  Per daughter, this is the only seizure-like episode she had, and current admission is not for seizure-like activity.  - Start Keppra 500 mg BID  - Keppra level  - Seizure precautions     #UTI  #Hx of recurrent UTIs  Has had 3-4 UTIs in the past year per daughter.  UA with pyuria.  Clinically somnolent, which could be related to the stroke, but UTI can also cause encephalopathy, so we will treat with antibiotics.  - Ceftriaxone 1g Qday, received 2 doses.  From 5/23, switching to Bactrim p.o. twice daily for 5 days.  -Waiting for cultures-so far positive for gram-positive bacilli, sensitivities have not come back.     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  She has had multiple episodes in the hospital in the past, but her glucose would drop down to 15, and she would become unresponsive.  -Endocrinology consulted, appreciate recs (5/22).    -Lantus 22 units-- decreased to 18 units given CKD (worsening egfr) and adding of CHO coverage-- with treated infection needs will likely decrease- patient known to have labile BG with severe hypoglycemia              -Novolog 1:18 CHO coverage with meals/snacks/supplements               -Novolog high sliding scale AC/HS-- decreased to medium-- changed future to correct when greater than 170 during day given history of severe low BG               -BG monitoring TID AC, HS, 0200              -hypoglycemia protocol              -recommend carb consistent diet with carb counting protocol              -diabetes education needs will be assessed closer to discharge  -On discharge, would need follow-up with [facility] endocrinology service.     #EVON on CKD  Baseline creatinine of 1.3-1.4.  Creatinine today 1.7, with high BUN:  Creatinine ratio, likely prerenal especially given patient was not drinking much in the ED.  -S/p 1 L fluids overnight.  -Encouraged p.o. intake.  - Daily BMP     #Dementia  -  2/2 Vascular vs Alzheimer's.  On her 2021 MRI, no signs suggestive of CVA.  She did have microhemorrhages on SWI, but all of these were deep.    Patient Follow-up    - in 4-6 weeks with general neurology ([phone])    Prophylaxis            For VTE Prevention:  - enoxaparin     For Acid Suppression:  - GI prophylaxis is not indicated     Code Status  Full Code     During initial physical assessment, the plan of care was discussed and developed with patient.  Plan of care includes: the above.     Patient was admitted via [facility] ED ([place])     Patient was seen and discussed with attending, Dr. Macias.    TYLER Nur  PGY1 Resident  Stroke [facility] *[id number]  ______________________________________________________    Clinically Significant Risk Factors                  # Hypertension: Noted on problem list     # Dementia: noted on problem list           # CKD, Stage 3b (GFR 30-44): Will monitor and treat as appropriate  - last Cr =  1.48 mg/dL (Ref range: 0.51 - 0.95 mg/dL)  - last GFR = 39 mL/min/1.73m2 (Ref range: >60 mL/min/1.73m2)        Medications   Scheduled Meds    aspirin  81 mg Oral or Feeding Tube Daily     atorvastatin  40 mg Oral or Feeding Tube Daily     carvedilol  12.5 mg Oral or Feeding Tube BID w/meals     cefTRIAXone  1 g Intravenous Q24H     clopidogrel  75 mg Oral or NG Tube Daily     cyanocobalamin  1,000 mcg Oral or Feeding Tube Daily     doxazosin  1 mg Oral At Bedtime     DULoxetine  20 mg Oral or Feeding Tube Daily     heparin ANTICOAGULANT  5,000 Units Subcutaneous Q12H     insulin aspart   Subcutaneous TID w/meals     insulin aspart  1-5 Units Subcutaneous At Bedtime     insulin aspart  1-6 Units Subcutaneous TID AC     insulin glargine  18 Units Subcutaneous At Bedtime     levETIRAcetam  500  mg Oral or Feeding Tube BID     levothyroxine  75 mcg Oral or Feeding Tube Daily     sodium chloride (PF)  3 mL Intracatheter Q8H     Vitamin D3  25 mcg Oral or Feeding Tube Daily       Infusion Meds    - MEDICATION INSTRUCTIONS -         PRN Meds  acetaminophen, glucose **OR** dextrose **OR** glucagon, labetalol **OR** hydrALAZINE, insulin aspart, lidocaine 4%, lidocaine (buffered or not buffered), - MEDICATION INSTRUCTIONS -, sodium chloride (PF)       PHYSICAL EXAMINATION  Temp:  [97.3  F (36.3  C)-99.2  F (37.3  C)] 97.7  F (36.5  C)  Pulse:  [74-91] 85  Resp:  [16-18] 18  BP: (148-183)/(69-82) 150/69  SpO2:  [97 %-99 %] 99 %      General: Awake and alert in NAD   HEENT: Normocephalic, atraumatic, no epistaxis, no oral lesions  Resp: Breathing comfortably on room air  CV: Extremities warm and well perfused  Abdomen: +BS, Soft, non-distended, non-tender  Extremities: Warm and well perfused, peripheral pulses present, no edema  Skin: Not jaundiced, no rash, no ecchymoses  Psych: Normal mood and affect     Neuro:  Mental status: Awake, alert, attentive;  she is able to answer questions, but after a small delay.  On prompting, she is able to tell where she is, she can choose the month of May when offered options, she can tell her age.  She can follow simple commands as well as two-step commands.  Cranial nerves: Visual fields intact, Eyes conjugate, EOMI w/ normal and smooth pursuit, face expression symmetric, hearing intact to conversation, shoulder shrug strong, does not open mouth or stick out tongue to command  Motor: Tone normal.  At least antigravity in all extremities without any drift.  When we asked her to roll her index fingers around each other, the right finger was rolling more than the left, suggestive of perhaps subtle left-sided hemiparesis.  No abnormal movements noted. Pronator drift absent.   Reflexes: No clonus. Hyperreflexic on patellars (3+).   Sensory: Intact to light touch in all 4  extremities  Coordination: FNF intact bilaterally with no dysmetria; Normal heel-shin test bilaterally with no dysmetria noted  Gait: Deferred    Stroke Scales    NIHSS  1a. Level of Consciousness 0-->Alert, keenly responsive   1b. LOC Questions 1-->Answers one question correctly   1c. LOC Commands 0-->Performs both tasks correctly   2.   Best Gaze 0-->Normal   3.   Visual 0-->No visual loss   4.   Facial Palsy 0-->Normal symmetrical movements   5a. Motor Arm, Left 0-->No drift, limb holds 90 (or 45) degrees for full 10 secs   5b. Motor Arm, Right 0-->No drift, limb holds 90 (or 45) degrees for full 10 secs   6a. Motor Leg, Left 0-->No drift, leg holds 30 degree position for full 5 secs   6b. Motor Leg, right 0-->No drift, leg holds 30 degree position for full 5 secs   7.   Limb Ataxia 0-->Absent   8.   Sensory 0-->Normal, no sensory loss   9.   Best Language 0-->No aphasia, normal   10. Dysarthria 1-->Mild-to-moderate dysarthria, patient slurs at least some words and, at worst, can be understood with some difficulty   11. Extinction and Inattention  0-->No abnormality   Total 2 (05/22/23 1200)       Modified Timothy Score (Pre-morbid)  3-Moderate disability; requiring some help, but able to walk without assistance    Imaging  I personally reviewed all imaging; relevant findings per HPI.     Lab Results Data   CBC  Recent Labs   Lab 05/21/23  0627 05/20/23  1312   WBC 7.3 8.2   RBC 3.77* 3.79*   HGB 10.7* 10.9*   HCT 35.0 34.9*    294     Basic Metabolic Panel    Recent Labs   Lab 05/22/23  1711 05/22/23  1559 05/22/23  1253 05/22/23  0136 05/22/23  0007 05/21/23  0847 05/21/23  0627 05/20/23  1952 05/20/23  1312   NA  --   --   --   --  134*  --  139  --  142   POTASSIUM  --   --   --   --  4.4  --  3.9  --  4.3   CHLORIDE  --   --   --   --  102  --  105  --  105   CO2  --   --   --   --  19*  --  21*  --  25   BUN  --   --   --   --  38.3*  --  29.3*  --  38.2*   CR  --   --   --   --  1.75*  --  1.48*  --   1.71*  1.71*   * 243* 230*   < > 496*   < > 139*   < > 149*   VERONICA  --   --   --   --  8.4*  --  8.8  --  9.5    < > = values in this interval not displayed.     Liver Panel  Recent Labs   Lab 05/20/23  1312   PROTTOTAL 6.9   ALBUMIN 3.7   BILITOTAL 0.3   ALKPHOS 189*   AST 17   ALT 17     INR    Recent Labs   Lab Test 12/10/22  1133 12/10/22  1132 05/03/22  0229   INR 1.00 1.1* 1.3*      Lipid Profile    Recent Labs   Lab Test 05/20/23  1312 03/29/23  1158 04/06/22  0014   CHOL 136 141 99   HDL 35* 48* 34*   LDL 71 72 39   TRIG 152* 105 129     A1C    Recent Labs   Lab Test 05/20/23  1312 03/29/23  1158 10/31/22  1721   A1C 10.8* 11.2* 10.3*     Troponin    Recent Labs   Lab 05/20/23  1924 05/20/23  1312   CTROPT  --  36*   TROPONINI 0.02  --           Data

## 2023-05-22 NOTE — CONSULTS
Patient not appropriate for stroke education. Daughters declined need for stroke education.    Mindy Webb RN  [facility]  [phone]

## 2023-05-22 NOTE — PROVIDER NOTIFICATION
Text paged sent to Neurology Stroke Adult Team:   JOHNNIE Pt. bg was [phone] before correction insulin given. Recheck is 564. Any intervention? Thanks Alecia MARTEL [phone]

## 2023-05-22 NOTE — CONSULTS
Care Management Initial Consult    General Information  Assessment completed with: Family (danielapromalkaca Ya and Mel), Felton  Type of CM/SW Visit: Initial Assessment    Primary Care Provider verified and updated as needed: Yes   Readmission within the last 30 days: no previous admission in last 30 days      Reason for Consult: discharge planning  Advance Care Planning:            Communication Assessment  Patient's communication style: spoken language (English or Bilingual)             Cognitive  Cognitive/Neuro/Behavioral: .WDL except  Level of Consciousness: alert  Arousal Level: opens eyes spontaneously  Orientation: disoriented to, situation (may need choices for questions)  Mood/Behavior: calm, cooperative  Best Language: 0 - No aphasia  Speech: slow, whispers, word-finding difficulty, dysarthric    Living Environment:   People in home: child(tiffany), adult  Mel  Current living Arrangements: apartment      Able to return to prior arrangements: yes       Family/Social Support:  Care provided by: child(tiffany) (Meri is PCA as well for patient)  Provides care for: no one, no one, unable/limited ability to care for self                Description of Support System:   Supportive and involved       Current Resources:   Patient receiving home care services: No     Community Resources: County Worker  Equipment currently used at home: walker, standard, wheelchair, manual, shower chair  Supplies currently used at home: Other (walker, wheel chair, incontinence suplies, shower chair)    Employment/Financial:  Employment Status:  disable       Financial Concerns: No concerns identified           Does the patient's insurance plan have a 3 day qualifying hospital stay waiver?  No    Lifestyle & Psychosocial Needs:  Social Determinants of Health     Tobacco Use: Medium Risk (3/15/2023)    Patient History      Smoking Tobacco Use: Former      Smokeless Tobacco Use: Never      Passive Exposure: Not on file   Alcohol Use: Not on  file   Financial Resource Strain: Not on file   Food Insecurity: Not on file   Transportation Needs: Not on file   Physical Activity: Not on file   Stress: Not on file   Social Connections: Not on file   Intimate Partner Violence: Not on file   Depression: At risk (11/2/2020)    PHQ-2      PHQ-2 Score: 4   Housing Stability: Not on file       Functional Status:  Prior to admission patient needed assistance:   Dependent ADLs:: Ambulation-walker, Wheelchair-with assist, Toileting, Incontinence, Dressing, Bathing, Transfers  Dependent IADLs:: Cleaning, Cooking, Laundry, Shopping, Meal Preparation, Medication Management, Transportation, Money Management, Incontinence  Assesssment of Functional Status: Not at baseline with ADL Functioning    Mental Health Status:  Mental Health Status: Other (see comment)  Mental Health Management:  (Dementia)    Additional Information:  Called daughter Felton to discuss discharge palling.   Daughter mentioned that Mel her sister lives with patient and provide PCA hourse for patient. Patient ambulates short distance with walker and long distance with wheelchair. Sometimes she is able to participate with transfer sometimes she is not. Patient is dependant for ADL's  And care in general.  Daughter was mostly concerned about her mom shocking on food. Provider notified.   Educated daughter on discharged disposition. She is ok with the plan  Discuss and educated on homecare referral.     Multiple referral sent out by community Health Worker.  Will continue to follow up.    Nicolasa Nguyen RN, PHN, BSN   Float Nurse Care Coordinator  Covering for [address]  Phone [phone]

## 2023-05-22 NOTE — PROGRESS NOTES
Pt. Transferred to  via bed with transport staff. Report given to bedside nurse (Kim). All pt belongings sent with patient

## 2023-05-22 NOTE — PROGRESS NOTES
Initial Home Care Referrals     CHW was tasked with sending initial home care referrals for PT/OT. Per OT notes, pt receives 40 hours of PCA services with daughter being her PCA. Patient is discharging 5/22 or 5/23.     Pending    [facility]  [phone]  5/22: CHW sent referral via Epic.     [facility]  [phone]  5/22: CHW sent referral via Epic.     [facility]  [phone]  5/22: CHW sent referral via Epic.     [facility]  [phone]  5/22: CHW sent referral via Epic.     [facility]  [phone]  5/22: CHW sent referral via Epic.     [facility]  [phone]  5/22: CHW sent referral via Epic.     Declined    [facility] - not accepting new referrals  [facility] - capped w/Medicare   [facility] - declined due insurance   [facility] - declined due to capacity   [facility] - not taking [facility] for PT.  [facility] - unable to staff   [facility] - PT/OT fully booked  [facility] - unable to staff  [facility] -not accepting new referrals  [facility] - no new referrals   [facility] - unable to accept insurance  [facility] - facility full  [facility] - insurance out of network  [facility] - unable to reach by phone or find in [facility]  [facility] - at capacity in [facility] - declined due to insurance

## 2023-05-23 RX ORDER — INSULIN LISPRO 100 [IU]/ML
INJECTION, SOLUTION INTRAVENOUS; SUBCUTANEOUS
Status: CANCELLED | OUTPATIENT
Start: 2023-05-23

## 2023-05-23 NOTE — PROGRESS NOTES
Initial Home Care Referrals      CHW was tasked with following up on home care referrals for PT/OT by Tianna DAVALOS RNCC. Per OT notes, pt receives 40 hours of PCA services with daughter being her PCA. Patient discharged 5/22.      Pending     [facility]  [phone]  5/22: CHW sent referral via [employer].   5/23: CHW spoke with Jocelyn who said they are checking insurance.      [facility]  [phone]  5/22: CHW sent referral via Epic.      Declined     [facility] - not accepting new referrals  [facility] - capped w/Medicare   [facility] - declined due insurance   [facility] - declined due to capacity   [facility] - not taking [facility] for PT.  [facility] - unable to staff   [facility] - PT/OT fully booked  [facility] - unable to staff  [facility] -not accepting new referrals  [facility] - no new referrals   [facility] - unable to accept insurance  [facility] - facility full  [facility] - insurance out of network  [facility] - unable to reach by phone or find in [facility] - at capacity in area  [facility] - declined due to insurance  [facility] - out of insurance network  [facility] - No therapies available  [facility] - declined (no reason given)  [facility] - no therapies available

## 2023-05-23 NOTE — PLAN OF CARE
Occupational Therapy Discharge Summary    Reason for therapy discharge:    Discharged to home with home therapy.    Progress towards therapy goal(s). See goals on Care Plan in [facility] electronic health record for goal details.  Goals partially met.  Barriers to achieving goals:   discharge from facility.    Therapy recommendation(s):    Continued therapy is recommended.  Rationale/Recommendations:   OT to progress functional independence and continued PCA assist for ADLs .

## 2023-05-23 NOTE — PLAN OF CARE
Physical Therapy Discharge Summary    Reason for therapy discharge:    Discharged to home with home therapy.    Progress towards therapy goal(s). See goals on Care Plan in Psych[facility] electronic health record for goal details.  Goals partially met.  Barriers to achieving goals:   discharge from facility.    Therapy recommendation(s):    Continued therapy is recommended.  Rationale/Recommendations:  Recommend discharge home with assist from daughter and PCA for ADLs and functional mobility with FWW. HHPT to maximize IND functional mobility, reduce risk for falls within home, and reduce risk of re-admission. .

## 2023-05-23 NOTE — PLAN OF CARE
Discharge time/date: 5/22/23 @ 7pm  Walked or Wheelchair: Wheelchair  PIV removed: Yes  Reviewed AVS with patient: reviewed with pt and daughter  Medication due times added to AVS in EPIC: Yes  Verbalized understanding of discharge with teachback: Yes  Medications retrieved from pharmacy: Picking up from outpatient pharmacy  Supplies sent home: Yes, including heart monitor  Belongings from security with patient: N/A

## 2023-05-23 NOTE — TELEPHONE ENCOUNTER
insulin lispro (HUMALOG KWIKPEN) 100 UNIT/ML (1 unit dial) KWIKPEN  Last Written Prescription Date:  3/15/2023  Last Fill Quantity: 15,   # refills: 1  Last Office Visit :  3/15/2023  Future Office visit:  6/26/2023    Routing refill request to provider for review/approval because:  Refer to clinic for review.  Orders do not match.   Please review and clarify.  Also, who is managing this med?  Dr. Bony Castaneda MD?? Or Dr. Ronnie Kellogg MD?  Order in chart is a totally different order and Providers Signature compared to the one from the pharmacy.    Please review, clarify, and send new updated order with updated providers signature for Pt care.   Thank you       Nataly Hare RN  Central Triage Red Flags/Med Refills

## 2023-05-23 NOTE — PLAN OF CARE
Speech Language Therapy Discharge Summary    Reason for therapy discharge:    Discharged to home with home therapy.    Progress towards therapy goal(s). See goals on Care Plan in Epic electronic health record for goal details.  Goals not met.  Barriers to achieving goals:   discharge from facility.    Therapy recommendation(s):    Continued therapy is recommended.  Rationale/Recommendations:  Continued ST for dysphagia as appropriate.    Recommend pureed diet (4) and thin liquids (0) with 1:1 supervision. Please crush pills (if able) and serve in puree. Caregivers to assist pt with upright positioning for all PO, small sips/bites, and cue/verify each swallow response. Please monitor for pocketing.

## 2023-05-23 NOTE — TELEPHONE ENCOUNTER
Spoke with the pharmacy, this Rx is old, Dr. Castaneda sent over an updated Rx on 3/15/23 for patient.     Diane Lopez RN on 5/23/2023 at 3:42 PM

## 2023-05-23 NOTE — TELEPHONE ENCOUNTER
insulin lispro (HUMALOG KWIKPEN) 100 UNIT/ML (1 unit dial) KWIKPEN         Last Written Prescription Date:  3/15/23  Last Fill Quantity: 15 ml,   # refills: 1  Last Office Visit : 3/15/23 pcp  Future Office visit:  6/2/23 endo    Routing refill request to provider for review/approval because:  Insulin - refilled per clinic

## 2023-05-24 ENCOUNTER — PATIENT OUTREACH (OUTPATIENT)
Dept: CARE COORDINATION | Facility: CLINIC | Age: 63
End: 2023-05-24
Payer: COMMERCIAL

## 2023-05-24 NOTE — PROGRESS NOTES
Clinic Care Coordination Contact  [facility]/Voicemail       Clinical Data: Care Coordinator Outreach  Outreach attempted x 2.  Left message on patient's voicemail with call back information and requested return call.  Plan: Care Coordinator will do no further outreaches at this time.    URBANO Rodarte  [phone]  [facility]

## 2023-05-25 LAB
BACTERIA UR CULT: ABNORMAL

## 2023-05-31 ENCOUNTER — TELEPHONE (OUTPATIENT)
Dept: ENDOCRINOLOGY | Facility: CLINIC | Age: 63
End: 2023-05-31
Payer: COMMERCIAL

## 2023-05-31 NOTE — TELEPHONE ENCOUNTER
Called patient and left voicemail. Patient has an appointment on 6/2/23. Need to collect the last 14 days worth of blood sugar readings to prepare for patient's visit.   Alisia Colon MA

## 2023-06-11 ENCOUNTER — APPOINTMENT (OUTPATIENT)
Dept: GENERAL RADIOLOGY | Facility: CLINIC | Age: 63
DRG: 639 | End: 2023-06-11
Payer: COMMERCIAL

## 2023-06-11 ENCOUNTER — APPOINTMENT (OUTPATIENT)
Dept: CT IMAGING | Facility: CLINIC | Age: 63
DRG: 639 | End: 2023-06-11
Attending: EMERGENCY MEDICINE
Payer: COMMERCIAL

## 2023-06-11 ENCOUNTER — HOSPITAL ENCOUNTER (INPATIENT)
Facility: CLINIC | Age: 63
LOS: 4 days | Discharge: HOME HEALTH | DRG: 639 | End: 2023-06-15
Attending: EMERGENCY MEDICINE | Admitting: INTERNAL MEDICINE
Payer: COMMERCIAL

## 2023-06-11 ENCOUNTER — APPOINTMENT (OUTPATIENT)
Dept: SPEECH THERAPY | Facility: CLINIC | Age: 63
DRG: 639 | End: 2023-06-11
Attending: INTERNAL MEDICINE
Payer: COMMERCIAL

## 2023-06-11 DIAGNOSIS — E10.10 DIABETIC KETOACIDOSIS WITHOUT COMA ASSOCIATED WITH TYPE 1 DIABETES MELLITUS (H): ICD-10-CM

## 2023-06-11 DIAGNOSIS — R11.0 NAUSEA: ICD-10-CM

## 2023-06-11 DIAGNOSIS — E10.42 TYPE 1 DIABETES MELLITUS WITH DIABETIC POLYNEUROPATHY (H): Primary | ICD-10-CM

## 2023-06-11 DIAGNOSIS — D69.6 THROMBOCYTOPENIA (H): ICD-10-CM

## 2023-06-11 LAB
ALBUMIN SERPL BCG-MCNC: 4.1 G/DL (ref 3.5–5.2)
ALBUMIN UR-MCNC: 20 MG/DL
ALP SERPL-CCNC: 170 U/L (ref 35–104)
ALT SERPL W P-5'-P-CCNC: 29 U/L (ref 10–35)
ANION GAP SERPL CALCULATED.3IONS-SCNC: 10 MMOL/L (ref 7–15)
ANION GAP SERPL CALCULATED.3IONS-SCNC: 12 MMOL/L (ref 7–15)
ANION GAP SERPL CALCULATED.3IONS-SCNC: 12 MMOL/L (ref 7–15)
ANION GAP SERPL CALCULATED.3IONS-SCNC: 21 MMOL/L (ref 7–15)
ANION GAP SERPL CALCULATED.3IONS-SCNC: 27 MMOL/L (ref 7–15)
ANION GAP SERPL CALCULATED.3IONS-SCNC: 29 MMOL/L (ref 7–15)
APPEARANCE UR: CLEAR
AST SERPL W P-5'-P-CCNC: 25 U/L (ref 10–35)
ATRIAL RATE - MUSE: 106 BPM
B-OH-BUTYR SERPL-SCNC: 0.2 MMOL/L
B-OH-BUTYR SERPL-SCNC: 0.2 MMOL/L
B-OH-BUTYR SERPL-SCNC: 0.3 MMOL/L
B-OH-BUTYR SERPL-SCNC: 0.4 MMOL/L
B-OH-BUTYR SERPL-SCNC: 0.6 MMOL/L
B-OH-BUTYR SERPL-SCNC: 1.5 MMOL/L
B-OH-BUTYR SERPL-SCNC: 4.1 MMOL/L
B-OH-BUTYR SERPL-SCNC: 7.6 MMOL/L
BASE EXCESS BLDV CALC-SCNC: -14.2 MMOL/L (ref -7.7–1.9)
BASOPHILS # BLD AUTO: 0 10E3/UL (ref 0–0.2)
BASOPHILS NFR BLD AUTO: 0 %
BILIRUB DIRECT SERPL-MCNC: <0.2 MG/DL (ref 0–0.3)
BILIRUB SERPL-MCNC: 0.4 MG/DL
BILIRUB UR QL STRIP: NEGATIVE
BUN SERPL-MCNC: 48.2 MG/DL (ref 8–23)
BUN SERPL-MCNC: 52.4 MG/DL (ref 8–23)
BUN SERPL-MCNC: 56.7 MG/DL (ref 8–23)
BUN SERPL-MCNC: 69.1 MG/DL (ref 8–23)
BUN SERPL-MCNC: 73.6 MG/DL (ref 8–23)
BUN SERPL-MCNC: 73.8 MG/DL (ref 8–23)
CALCIUM SERPL-MCNC: 8.6 MG/DL (ref 8.8–10.2)
CALCIUM SERPL-MCNC: 8.7 MG/DL (ref 8.8–10.2)
CALCIUM SERPL-MCNC: 8.8 MG/DL (ref 8.8–10.2)
CALCIUM SERPL-MCNC: 8.9 MG/DL (ref 8.8–10.2)
CALCIUM SERPL-MCNC: 9 MG/DL (ref 8.8–10.2)
CALCIUM SERPL-MCNC: 9.9 MG/DL (ref 8.8–10.2)
CHLORIDE SERPL-SCNC: 106 MMOL/L (ref 98–107)
CHLORIDE SERPL-SCNC: 113 MMOL/L (ref 98–107)
CHLORIDE SERPL-SCNC: 114 MMOL/L (ref 98–107)
CHLORIDE SERPL-SCNC: 115 MMOL/L (ref 98–107)
CHLORIDE SERPL-SCNC: 89 MMOL/L (ref 98–107)
CHLORIDE SERPL-SCNC: 98 MMOL/L (ref 98–107)
COLOR UR AUTO: ABNORMAL
CREAT SERPL-MCNC: 1.46 MG/DL (ref 0.51–0.95)
CREAT SERPL-MCNC: 1.49 MG/DL (ref 0.51–0.95)
CREAT SERPL-MCNC: 1.53 MG/DL (ref 0.51–0.95)
CREAT SERPL-MCNC: 1.75 MG/DL (ref 0.51–0.95)
CREAT SERPL-MCNC: 1.81 MG/DL (ref 0.51–0.95)
CREAT SERPL-MCNC: 1.93 MG/DL (ref 0.51–0.95)
DEPRECATED HCO3 PLAS-SCNC: 12 MMOL/L (ref 22–29)
DEPRECATED HCO3 PLAS-SCNC: 12 MMOL/L (ref 22–29)
DEPRECATED HCO3 PLAS-SCNC: 18 MMOL/L (ref 22–29)
DEPRECATED HCO3 PLAS-SCNC: 19 MMOL/L (ref 22–29)
DEPRECATED HCO3 PLAS-SCNC: 19 MMOL/L (ref 22–29)
DEPRECATED HCO3 PLAS-SCNC: 8 MMOL/L (ref 22–29)
DIASTOLIC BLOOD PRESSURE - MUSE: NORMAL MMHG
EOSINOPHIL # BLD AUTO: 0 10E3/UL (ref 0–0.7)
EOSINOPHIL NFR BLD AUTO: 0 %
ERYTHROCYTE [DISTWIDTH] IN BLOOD BY AUTOMATED COUNT: 13.6 % (ref 10–15)
GFR SERPL CREATININE-BSD FRML MDRD: 28 ML/MIN/1.73M2
GFR SERPL CREATININE-BSD FRML MDRD: 31 ML/MIN/1.73M2
GFR SERPL CREATININE-BSD FRML MDRD: 32 ML/MIN/1.73M2
GFR SERPL CREATININE-BSD FRML MDRD: 38 ML/MIN/1.73M2
GFR SERPL CREATININE-BSD FRML MDRD: 39 ML/MIN/1.73M2
GFR SERPL CREATININE-BSD FRML MDRD: 40 ML/MIN/1.73M2
GLUCOSE BLDC GLUCOMTR-MCNC: 108 MG/DL (ref 70–99)
GLUCOSE BLDC GLUCOMTR-MCNC: 122 MG/DL (ref 70–99)
GLUCOSE BLDC GLUCOMTR-MCNC: 126 MG/DL (ref 70–99)
GLUCOSE BLDC GLUCOMTR-MCNC: 133 MG/DL (ref 70–99)
GLUCOSE BLDC GLUCOMTR-MCNC: 141 MG/DL (ref 70–99)
GLUCOSE BLDC GLUCOMTR-MCNC: 142 MG/DL (ref 70–99)
GLUCOSE BLDC GLUCOMTR-MCNC: 162 MG/DL (ref 70–99)
GLUCOSE BLDC GLUCOMTR-MCNC: 164 MG/DL (ref 70–99)
GLUCOSE BLDC GLUCOMTR-MCNC: 180 MG/DL (ref 70–99)
GLUCOSE BLDC GLUCOMTR-MCNC: 201 MG/DL (ref 70–99)
GLUCOSE BLDC GLUCOMTR-MCNC: 285 MG/DL (ref 70–99)
GLUCOSE BLDC GLUCOMTR-MCNC: 316 MG/DL (ref 70–99)
GLUCOSE BLDC GLUCOMTR-MCNC: 383 MG/DL (ref 70–99)
GLUCOSE BLDC GLUCOMTR-MCNC: 421 MG/DL (ref 70–99)
GLUCOSE BLDC GLUCOMTR-MCNC: 453 MG/DL (ref 70–99)
GLUCOSE BLDC GLUCOMTR-MCNC: 512 MG/DL (ref 70–99)
GLUCOSE BLDC GLUCOMTR-MCNC: >600 MG/DL (ref 70–99)
GLUCOSE SERPL-MCNC: 1022 MG/DL (ref 70–99)
GLUCOSE SERPL-MCNC: 121 MG/DL (ref 70–99)
GLUCOSE SERPL-MCNC: 129 MG/DL (ref 70–99)
GLUCOSE SERPL-MCNC: 199 MG/DL (ref 70–99)
GLUCOSE SERPL-MCNC: 575 MG/DL (ref 70–99)
GLUCOSE SERPL-MCNC: 810 MG/DL (ref 70–99)
GLUCOSE UR STRIP-MCNC: >=1000 MG/DL
HCO3 BLDV-SCNC: 14 MMOL/L (ref 21–28)
HCT VFR BLD AUTO: 36.1 % (ref 35–47)
HGB BLD-MCNC: 11.1 G/DL (ref 11.7–15.7)
HGB UR QL STRIP: NEGATIVE
HOLD SPECIMEN: NORMAL
IMM GRANULOCYTES # BLD: 0 10E3/UL
IMM GRANULOCYTES NFR BLD: 1 %
INTERPRETATION ECG - MUSE: NORMAL
KETONES UR STRIP-MCNC: 40 MG/DL
LEUKOCYTE ESTERASE UR QL STRIP: NEGATIVE
LEVETIRACETAM SERPL-MCNC: <2 ΜG/ML (ref 10–40)
LYMPHOCYTES # BLD AUTO: 0.9 10E3/UL (ref 0.8–5.3)
LYMPHOCYTES NFR BLD AUTO: 11 %
MAGNESIUM SERPL-MCNC: 2.3 MG/DL (ref 1.7–2.3)
MCH RBC QN AUTO: 29.8 PG (ref 26.5–33)
MCHC RBC AUTO-ENTMCNC: 30.7 G/DL (ref 31.5–36.5)
MCV RBC AUTO: 97 FL (ref 78–100)
MONOCYTES # BLD AUTO: 0.2 10E3/UL (ref 0–1.3)
MONOCYTES NFR BLD AUTO: 2 %
MUCOUS THREADS #/AREA URNS LPF: PRESENT /LPF
NEUTROPHILS # BLD AUTO: 6.9 10E3/UL (ref 1.6–8.3)
NEUTROPHILS NFR BLD AUTO: 86 %
NITRATE UR QL: NEGATIVE
NRBC # BLD AUTO: 0 10E3/UL
NRBC BLD AUTO-RTO: 0 /100
O2/TOTAL GAS SETTING VFR VENT: 21 %
OSMOLALITY SERPL: 375 MMOL/KG (ref 280–301)
P AXIS - MUSE: 57 DEGREES
PCO2 BLDV: 37 MM HG (ref 40–50)
PH BLDV: 7.17 [PH] (ref 7.32–7.43)
PH UR STRIP: 5 [PH] (ref 5–7)
PHOSPHATE SERPL-MCNC: 5.6 MG/DL (ref 2.5–4.5)
PLATELET # BLD AUTO: 179 10E3/UL (ref 150–450)
PO2 BLDV: 35 MM HG (ref 25–47)
POTASSIUM SERPL-SCNC: 3.4 MMOL/L (ref 3.4–5.3)
POTASSIUM SERPL-SCNC: 3.4 MMOL/L (ref 3.4–5.3)
POTASSIUM SERPL-SCNC: 3.5 MMOL/L (ref 3.4–5.3)
POTASSIUM SERPL-SCNC: 3.5 MMOL/L (ref 3.4–5.3)
POTASSIUM SERPL-SCNC: 3.6 MMOL/L (ref 3.4–5.3)
POTASSIUM SERPL-SCNC: 3.7 MMOL/L (ref 3.4–5.3)
POTASSIUM SERPL-SCNC: 4.5 MMOL/L (ref 3.4–5.3)
POTASSIUM SERPL-SCNC: 5.9 MMOL/L (ref 3.4–5.3)
PR INTERVAL - MUSE: 148 MS
PROT SERPL-MCNC: 7.1 G/DL (ref 6.4–8.3)
QRS DURATION - MUSE: 116 MS
QT - MUSE: 368 MS
QTC - MUSE: 488 MS
R AXIS - MUSE: 77 DEGREES
RBC # BLD AUTO: 3.73 10E6/UL (ref 3.8–5.2)
RBC URINE: <1 /HPF
SODIUM SERPL-SCNC: 128 MMOL/L (ref 136–145)
SODIUM SERPL-SCNC: 135 MMOL/L (ref 136–145)
SODIUM SERPL-SCNC: 139 MMOL/L (ref 136–145)
SODIUM SERPL-SCNC: 143 MMOL/L (ref 136–145)
SODIUM SERPL-SCNC: 144 MMOL/L (ref 136–145)
SODIUM SERPL-SCNC: 145 MMOL/L (ref 136–145)
SP GR UR STRIP: 1.03 (ref 1–1.03)
SQUAMOUS EPITHELIAL: 2 /HPF
SYSTOLIC BLOOD PRESSURE - MUSE: NORMAL MMHG
T AXIS - MUSE: 52 DEGREES
TSH SERPL DL<=0.005 MIU/L-ACNC: 1.13 UIU/ML (ref 0.3–4.2)
UROBILINOGEN UR STRIP-MCNC: NORMAL MG/DL
VENTRICULAR RATE- MUSE: 106 BPM
WBC # BLD AUTO: 8 10E3/UL (ref 4–11)
WBC URINE: 1 /HPF

## 2023-06-11 PROCEDURE — 82310 ASSAY OF CALCIUM: CPT | Performed by: EMERGENCY MEDICINE

## 2023-06-11 PROCEDURE — 36415 COLL VENOUS BLD VENIPUNCTURE: CPT

## 2023-06-11 PROCEDURE — 81001 URINALYSIS AUTO W/SCOPE: CPT | Performed by: EMERGENCY MEDICINE

## 2023-06-11 PROCEDURE — 250N000013 HC RX MED GY IP 250 OP 250 PS 637

## 2023-06-11 PROCEDURE — 250N000009 HC RX 250: Performed by: EMERGENCY MEDICINE

## 2023-06-11 PROCEDURE — 272N000278 HC DEVICE 5FR SECURACATH

## 2023-06-11 PROCEDURE — 84443 ASSAY THYROID STIM HORMONE: CPT

## 2023-06-11 PROCEDURE — 84132 ASSAY OF SERUM POTASSIUM: CPT | Performed by: INTERNAL MEDICINE

## 2023-06-11 PROCEDURE — 85025 COMPLETE CBC W/AUTO DIFF WBC: CPT | Performed by: EMERGENCY MEDICINE

## 2023-06-11 PROCEDURE — 250N000009 HC RX 250

## 2023-06-11 PROCEDURE — 82010 KETONE BODYS QUAN: CPT

## 2023-06-11 PROCEDURE — 82803 BLOOD GASES ANY COMBINATION: CPT | Performed by: EMERGENCY MEDICINE

## 2023-06-11 PROCEDURE — 92610 EVALUATE SWALLOWING FUNCTION: CPT | Mod: GN

## 2023-06-11 PROCEDURE — 93005 ELECTROCARDIOGRAM TRACING: CPT

## 2023-06-11 PROCEDURE — 36569 INSJ PICC 5 YR+ W/O IMAGING: CPT

## 2023-06-11 PROCEDURE — 70450 CT HEAD/BRAIN W/O DYE: CPT | Mod: 26 | Performed by: RADIOLOGY

## 2023-06-11 PROCEDURE — 83735 ASSAY OF MAGNESIUM: CPT | Performed by: EMERGENCY MEDICINE

## 2023-06-11 PROCEDURE — 272N000019 HC KIT OPEN ENDED DOUBLE LUMEN

## 2023-06-11 PROCEDURE — 87040 BLOOD CULTURE FOR BACTERIA: CPT

## 2023-06-11 PROCEDURE — 82248 BILIRUBIN DIRECT: CPT | Performed by: EMERGENCY MEDICINE

## 2023-06-11 PROCEDURE — 82010 KETONE BODYS QUAN: CPT | Performed by: EMERGENCY MEDICINE

## 2023-06-11 PROCEDURE — 71045 X-RAY EXAM CHEST 1 VIEW: CPT | Mod: 26 | Performed by: RADIOLOGY

## 2023-06-11 PROCEDURE — 250N000011 HC RX IP 250 OP 636

## 2023-06-11 PROCEDURE — 96360 HYDRATION IV INFUSION INIT: CPT

## 2023-06-11 PROCEDURE — 82962 GLUCOSE BLOOD TEST: CPT

## 2023-06-11 PROCEDURE — 83930 ASSAY OF BLOOD OSMOLALITY: CPT | Performed by: EMERGENCY MEDICINE

## 2023-06-11 PROCEDURE — 84100 ASSAY OF PHOSPHORUS: CPT | Performed by: EMERGENCY MEDICINE

## 2023-06-11 PROCEDURE — 272N000451 HC KIT SHRLOCK 5FR POWER PICC DOUBLE LUMEN

## 2023-06-11 PROCEDURE — 258N000003 HC RX IP 258 OP 636

## 2023-06-11 PROCEDURE — 99291 CRITICAL CARE FIRST HOUR: CPT | Mod: 25

## 2023-06-11 PROCEDURE — 82310 ASSAY OF CALCIUM: CPT

## 2023-06-11 PROCEDURE — 99223 1ST HOSP IP/OBS HIGH 75: CPT | Mod: AI | Performed by: INTERNAL MEDICINE

## 2023-06-11 PROCEDURE — 84132 ASSAY OF SERUM POTASSIUM: CPT

## 2023-06-11 PROCEDURE — 999N000065 XR CHEST PORT 1 VIEW

## 2023-06-11 PROCEDURE — 120N000002 HC R&B MED SURG/OB UMMC

## 2023-06-11 PROCEDURE — 36415 COLL VENOUS BLD VENIPUNCTURE: CPT | Performed by: EMERGENCY MEDICINE

## 2023-06-11 PROCEDURE — 99222 1ST HOSP IP/OBS MODERATE 55: CPT | Performed by: INTERNAL MEDICINE

## 2023-06-11 PROCEDURE — 80177 DRUG SCRN QUAN LEVETIRACETAM: CPT

## 2023-06-11 PROCEDURE — 70450 CT HEAD/BRAIN W/O DYE: CPT

## 2023-06-11 PROCEDURE — 258N000003 HC RX IP 258 OP 636: Performed by: EMERGENCY MEDICINE

## 2023-06-11 PROCEDURE — 36415 COLL VENOUS BLD VENIPUNCTURE: CPT | Performed by: INTERNAL MEDICINE

## 2023-06-11 RX ORDER — DEXTROSE MONOHYDRATE 25 G/50ML
25-50 INJECTION, SOLUTION INTRAVENOUS
Status: DISCONTINUED | OUTPATIENT
Start: 2023-06-11 | End: 2023-06-12

## 2023-06-11 RX ORDER — DEXTROSE MONOHYDRATE 25 G/50ML
25-50 INJECTION, SOLUTION INTRAVENOUS
Status: DISCONTINUED | OUTPATIENT
Start: 2023-06-11 | End: 2023-06-15 | Stop reason: HOSPADM

## 2023-06-11 RX ORDER — POTASSIUM CHLORIDE 750 MG/1
20 TABLET, EXTENDED RELEASE ORAL ONCE
Status: COMPLETED | OUTPATIENT
Start: 2023-06-11 | End: 2023-06-11

## 2023-06-11 RX ORDER — NICOTINE POLACRILEX 4 MG
15-30 LOZENGE BUCCAL
Status: DISCONTINUED | OUTPATIENT
Start: 2023-06-11 | End: 2023-06-12

## 2023-06-11 RX ORDER — ASPIRIN 81 MG/1
81 TABLET, CHEWABLE ORAL DAILY
Status: DISCONTINUED | OUTPATIENT
Start: 2023-06-11 | End: 2023-06-15 | Stop reason: HOSPADM

## 2023-06-11 RX ORDER — CARVEDILOL 12.5 MG/1
12.5 TABLET ORAL 2 TIMES DAILY WITH MEALS
Status: DISCONTINUED | OUTPATIENT
Start: 2023-06-11 | End: 2023-06-12

## 2023-06-11 RX ORDER — SODIUM CHLORIDE 9 MG/ML
INJECTION, SOLUTION INTRAVENOUS CONTINUOUS
Status: DISCONTINUED | OUTPATIENT
Start: 2023-06-11 | End: 2023-06-12

## 2023-06-11 RX ORDER — CLOPIDOGREL BISULFATE 75 MG/1
75 TABLET ORAL DAILY
Status: DISCONTINUED | OUTPATIENT
Start: 2023-06-11 | End: 2023-06-12

## 2023-06-11 RX ORDER — LEVETIRACETAM 500 MG/1
500 TABLET ORAL 2 TIMES DAILY
Status: DISCONTINUED | OUTPATIENT
Start: 2023-06-11 | End: 2023-06-12 | Stop reason: ALTCHOICE

## 2023-06-11 RX ORDER — LIDOCAINE 40 MG/G
CREAM TOPICAL
Status: ACTIVE | OUTPATIENT
Start: 2023-06-11 | End: 2023-06-14

## 2023-06-11 RX ORDER — LIDOCAINE 40 MG/G
CREAM TOPICAL
Status: DISCONTINUED | OUTPATIENT
Start: 2023-06-11 | End: 2023-06-15 | Stop reason: HOSPADM

## 2023-06-11 RX ORDER — POTASSIUM CHLORIDE 7.45 MG/ML
10 INJECTION INTRAVENOUS
Status: COMPLETED | OUTPATIENT
Start: 2023-06-11 | End: 2023-06-11

## 2023-06-11 RX ORDER — ATORVASTATIN CALCIUM 40 MG/1
40 TABLET, FILM COATED ORAL EVERY EVENING
Status: DISCONTINUED | OUTPATIENT
Start: 2023-06-11 | End: 2023-06-15 | Stop reason: HOSPADM

## 2023-06-11 RX ORDER — SODIUM CHLORIDE 9 MG/ML
INJECTION, SOLUTION INTRAVENOUS CONTINUOUS
Status: DISCONTINUED | OUTPATIENT
Start: 2023-06-11 | End: 2023-06-14

## 2023-06-11 RX ADMIN — POTASSIUM CHLORIDE 10 MEQ: 7.46 INJECTION, SOLUTION INTRAVENOUS at 14:23

## 2023-06-11 RX ADMIN — CARVEDILOL 12.5 MG: 12.5 TABLET, FILM COATED ORAL at 08:30

## 2023-06-11 RX ADMIN — HUMAN INSULIN 5.5 UNITS/HR: 100 INJECTION, SOLUTION SUBCUTANEOUS at 04:45

## 2023-06-11 RX ADMIN — HUMAN INSULIN: 100 INJECTION, SOLUTION SUBCUTANEOUS at 17:56

## 2023-06-11 RX ADMIN — HUMAN INSULIN: 100 INJECTION, SOLUTION SUBCUTANEOUS at 08:26

## 2023-06-11 RX ADMIN — ATORVASTATIN CALCIUM 40 MG: 40 TABLET, FILM COATED ORAL at 20:10

## 2023-06-11 RX ADMIN — LEVETIRACETAM 500 MG: 500 TABLET, FILM COATED ORAL at 20:10

## 2023-06-11 RX ADMIN — LEVOTHYROXINE SODIUM 75 MCG: 75 TABLET ORAL at 08:30

## 2023-06-11 RX ADMIN — POTASSIUM CHLORIDE 20 MEQ: 750 TABLET, EXTENDED RELEASE ORAL at 23:16

## 2023-06-11 RX ADMIN — CLOPIDOGREL BISULFATE 75 MG: 75 TABLET ORAL at 08:30

## 2023-06-11 RX ADMIN — POTASSIUM CHLORIDE 10 MEQ: 7.46 INJECTION, SOLUTION INTRAVENOUS at 17:52

## 2023-06-11 RX ADMIN — SODIUM CHLORIDE 1000 ML: 9 INJECTION, SOLUTION INTRAVENOUS at 03:45

## 2023-06-11 RX ADMIN — POTASSIUM CHLORIDE 10 MEQ: 7.46 INJECTION, SOLUTION INTRAVENOUS at 15:30

## 2023-06-11 RX ADMIN — LEVETIRACETAM 500 MG: 500 TABLET, FILM COATED ORAL at 08:30

## 2023-06-11 RX ADMIN — SODIUM CHLORIDE 1000 ML: 9 INJECTION, SOLUTION INTRAVENOUS at 06:35

## 2023-06-11 RX ADMIN — SODIUM CHLORIDE: 9 INJECTION, SOLUTION INTRAVENOUS at 12:31

## 2023-06-11 RX ADMIN — CARVEDILOL 12.5 MG: 12.5 TABLET, FILM COATED ORAL at 20:17

## 2023-06-11 RX ADMIN — HUMAN INSULIN 17.5 UNITS/HR: 100 INJECTION, SOLUTION SUBCUTANEOUS at 06:54

## 2023-06-11 RX ADMIN — POTASSIUM CHLORIDE 10 MEQ: 7.46 INJECTION, SOLUTION INTRAVENOUS at 16:30

## 2023-06-11 RX ADMIN — DEXTROSE MONOHYDRATE AND SODIUM CHLORIDE: 5; .45 INJECTION, SOLUTION INTRAVENOUS at 15:04

## 2023-06-11 RX ADMIN — SODIUM CHLORIDE: 9 INJECTION, SOLUTION INTRAVENOUS at 04:23

## 2023-06-11 RX ADMIN — ASPIRIN 81 MG CHEWABLE TABLET 81 MG: 81 TABLET CHEWABLE at 08:30

## 2023-06-11 ASSESSMENT — ACTIVITIES OF DAILY LIVING (ADL)
ADLS_ACUITY_SCORE: 35

## 2023-06-11 NOTE — ED TRIAGE NOTES
biba for hyperglycemia  Found by family vomiting, glucose reading read high  AAO x 2       Triage Assessment     Row Name 06/11/23 0330       Triage Assessment (Adult)    Airway WDL WDL       Respiratory WDL    Respiratory WDL WDL       Skin Circulation/Temperature WDL    Skin Circulation/Temperature WDL WDL       Cardiac WDL    Cardiac WDL WDL       Peripheral/Neurovascular WDL    Peripheral Neurovascular WDL WDL       Cognitive/Neuro/Behavioral WDL    Cognitive/Neuro/Behavioral WDL WDL

## 2023-06-11 NOTE — PROGRESS NOTES
NEW INPATIENT DIABETES MANAGEMENT CONSULT  Isabell Matthews  Age: 62 year old  MRN # [id number]   YOB: 1960    Chief Complaint: new DKA  Reason for Consult: new DKA  Consulting Provider: Medicine    Assessment:     1. DKA  #T1DM  Likely DKA due to overeating high glucose meal as per note. Need to clarify further history. Pt is aphasia and attempted to call family but no answer.      Plan:    -Continue insulin drip DKA protocol. Once DKA resolves, can resume diet   -start aspart mealtime /snack insulin 1/15 g CHO (ordered for you)   -Diabetes education needs will be assessed closer to discharge   -on discharge, will recommend outpatient follow up with [facility] service    Discussed plan of care with patient, nursing, and primary team   Thank you for this consult; Inpatient Diabetes will continue to follow.     History of Present Illness:   Isabell Matthews is a 62 year old female with PMHx of T1DM, seizures, CVA, hypothyroidism, CKD, HTN, history of DKA    Pt is unable to provide history due to aphasia. Attempted to call family but no answer. As per last note by her primary care, she was taking lantus 18 units at home and aspart 4 units with meal + sliding scale    Ordered DM relevant regimen  Insulin drip DKA protocol    Relevant Labs    0 lbs 0 oz  No results for input(s): A1C in the last 168 hours.  Recent Labs   Lab 06/11/23  0623   CR 1.81*     Recent Labs   Lab 06/11/23  0859 06/11/23  0820 06/11/23  0652 06/11/23  0623 06/11/23  0549 06/11/23  0342   * 512* >600* 810* >600* >600*         Other Active Medical Problems:   Diabetes Mellitus Type: 1  Duration:    unknown  Diabetic Complications:   retinopathy - +  Peripheral neuropathy - TBD  Nephropathy -   Lab Results   Component Value Date/Time    CR 1.81 (H) 06/11/2023 06:23 AM    CR 1.93 (H) 06/11/2023 03:39 AM    CR 1.48 (H) 06/27/2021 07:59 AM    CR 1.62 (H) 06/26/2021 06:34 AM     No results found for: MICROCR  Prior to Admission  Diabetes Regimen:       As per last note by her primary care, she was taking lantus 18 units at home and aspart 4 units with meal + sliding scale  Usual BG control PTA:    Lab Results   Component Value Date    A1C 10.8 2023    A1C 11.2 2023    A1C 10.3 10/31/2022    A1C 7.8 2021    A1C 11.7 2020     History of DKA: yes  Able to Detect Hypoglycemia:   Usual Diabetes Care Provider:  Bony Sapp MD. Has a follow-up with endocrine last week w/ Carolina Esteves but did not show up.  Factors Impacting IP Glucose Control:   Current Diet: Orders Placed This Encounter      NPO for Medical/Clinical Reasons Except for: Meds, Ice Chips      10 point ROS completed with pertinent positives and negatives noted in the HPI  Past medical, family and social histories are reviewed and updated.    Social History  Social History     Tobacco Use     Smoking status: Former     Packs/day: 0.50     Years: 35.00     Pack years: 17.50     Types: Cigarettes     Quit date: 2018     Years since quittin.9     Smokeless tobacco: Never   Vaping Use     Vaping status: Not on file   Substance Use Topics     Alcohol use: Not Currently         Family History     Family History   Problem Relation Age of Onset     Acute Myocardial Infarction Father      Heart Disease Maternal Grandmother      Dementia Maternal Grandmother      Myocardial Infarction Father      Dementia Paternal Grandmother      Heart Disease Paternal Grandmother        Physical Exam   BP (!) 159/72   Pulse 99   Temp 97.8  F (36.6  C) (Oral)   Resp 17   SpO2 95%   General: aphasia  HEENT: normocephalic, atraumatic. Oral mucous membranes moist.   Lungs: unlabored respiration, no cough  ABD: rounded, nondistended  Skin: warm and dry, no obvious lesions  MSK:  moves extremities  Lymp:  no LE edema   Mental status:  aphasia    Most Recent Laboratory Tests:  Recent Labs   Lab 23  0344   HGB 11.1*             To contact Endocrine Diabetes service:    From 8AM-4PM: page inpatient diabetes provider that is following the patient  For questions or updates from 4PM-8AM: page the diabetes job code for on call fellow: [phone]    Omkar Stokes MD  Endocrine Fellow  Pager # [phone]

## 2023-06-11 NOTE — PROGRESS NOTES
Nurse attempted to give patientt AM meds. Patientt nodded when nurse asked if she wanted pills put in her mouth. Pt open mouth for nurse when asked. Pt assisted when putting water up to mouth. No visual swallowing seen of water in mouth. Nurse asked if pt swallowed and patient nodded. Nurse than asked for patient to open mouth to check for pills. Patient did not acknowledge request. Nurse then took tongue depressor to look in mouth but was unable to get past teeth. No water noted around teeth. Pt then gagged and brown liquid was sprayed out. No solid pill forms found. No change in 02. 02 98% HR briefly jumped from 99 to 115 but soon went back to previous heart rate. Patient asked by nurse if she felt like throwing up and patient denied. No further signs of respiratory distress noted. Speech therapy aware and will attempt swallow study later today.

## 2023-06-11 NOTE — CONSULTS
NEW INPATIENT DIABETES MANAGEMENT CONSULT  Isabell Matthews  Age: 62 year old  MRN # [id number]   YOB: 1960    Chief Complaint: new DKA  Reason for Consult: new DKA  Consulting Provider: Medicine    Assessment:     1. DKA  #T1DM  Likely DKA due to overeating high glucose meal as per note. Need to clarify further history. Pt is aphasia and attempted to call family but no answer.      Plan:    -Continue insulin drip DKA protocol. Once DKA resolves, can resume diet   -start aspart mealtime /snack insulin 1/15 g CHO (ordered for you)   -Diabetes education needs will be assessed closer to discharge   -on discharge, will recommend outpatient follow up with [facility] service    Discussed plan of care with patient, nursing, and primary team   Thank you for this consult; Inpatient Diabetes will continue to follow.     History of Present Illness:   Isabell Matthews is a 62 year old female with PMHx of T1DM, seizures, CVA, hypothyroidism, CKD, HTN, history of DKA    Pt is unable to provide history due to aphasia. Attempted to call family but no answer. As per last note by her primary care, she was taking lantus 18 units at home and aspart 4 units with meal + sliding scale    Ordered DM relevant regimen  Insulin drip DKA protocol    Relevant Labs    0 lbs 0 oz  No results for input(s): A1C in the last 168 hours.  Recent Labs   Lab 06/11/23  0623   CR 1.81*     Recent Labs   Lab 06/11/23  0859 06/11/23  0820 06/11/23  0652 06/11/23  0623 06/11/23  0549 06/11/23  0342   * 512* >600* 810* >600* >600*         Other Active Medical Problems:   Diabetes Mellitus Type: 1  Duration:    unknown  Diabetic Complications:   retinopathy - +  Peripheral neuropathy - TBD  Nephropathy -   Lab Results   Component Value Date/Time    CR 1.81 (H) 06/11/2023 06:23 AM    CR 1.93 (H) 06/11/2023 03:39 AM    CR 1.48 (H) 06/27/2021 07:59 AM    CR 1.62 (H) 06/26/2021 06:34 AM     No results found for: MICROCR  Prior to Admission  Diabetes Regimen:       As per last note by her primary care, she was taking lantus 18 units at home and aspart 4 units with meal + sliding scale  Usual BG control PTA:    Lab Results   Component Value Date    A1C 10.8 2023    A1C 11.2 2023    A1C 10.3 10/31/2022    A1C 7.8 2021    A1C 11.7 2020     History of DKA: yes  Able to Detect Hypoglycemia:   Usual Diabetes Care Provider:  Bony Sapp MD. Has a follow-up with endocrine last week w/ Carolina Esteves but did not show up.  Factors Impacting IP Glucose Control:   Current Diet: Orders Placed This Encounter      NPO for Medical/Clinical Reasons Except for: Meds, Ice Chips      10 point ROS completed with pertinent positives and negatives noted in the HPI  Past medical, family and social histories are reviewed and updated.    Social History  Social History     Tobacco Use     Smoking status: Former     Packs/day: 0.50     Years: 35.00     Pack years: 17.50     Types: Cigarettes     Quit date: 2018     Years since quittin.9     Smokeless tobacco: Never   Vaping Use     Vaping status: Not on file   Substance Use Topics     Alcohol use: Not Currently         Family History     Family History   Problem Relation Age of Onset     Acute Myocardial Infarction Father      Heart Disease Maternal Grandmother      Dementia Maternal Grandmother      Myocardial Infarction Father      Dementia Paternal Grandmother      Heart Disease Paternal Grandmother        Physical Exam   BP (!) 159/72   Pulse 99   Temp 97.8  F (36.6  C) (Oral)   Resp 17   SpO2 95%   General: aphasia  HEENT: normocephalic, atraumatic. Oral mucous membranes moist.   Lungs: unlabored respiration, no cough  ABD: rounded, nondistended  Skin: warm and dry, no obvious lesions  MSK:  moves extremities  Lymp:  no LE edema   Mental status:  aphasia    Most Recent Laboratory Tests:  Recent Labs   Lab 23  0344   HGB 11.1*             To contact Endocrine Diabetes service:    From 8AM-4PM: page inpatient diabetes provider that is following the patient  For questions or updates from 4PM-8AM: page the diabetes job code for on call fellow: [phone]    Omkar Stokes MD  Endocrine Fellow  Pager # [phone]

## 2023-06-11 NOTE — PROGRESS NOTES
06/11/23 [id number]   Appointment Info   Signing Clinician's Name / Credentials (SLP) Cherry Malin MA CCC-SLP   General Information   Onset of Illness/Injury or Date of Surgery 06/11/23   Referring Physician Felicia Ridely MD   Patient/Family Therapy Goal Statement (SLP) None stated   Pertinent History of Current Problem Pt is a 62 year old female who has medical history type 1 diabetes, DKA, seizures, CVAs presenting with hyperglycemia and new DKA. Clinical swallow eval completed per MD order.   General Observations Upon SLP arrival, pt nonverbal throughout eval, but awake and inconsistently following commands. Possible aspiration event this AM w/ pills.   Type of Evaluation   Type of Evaluation Swallow Evaluation   Oral Motor   Oral Musculature unable to assess due to poor participation/comprehension   Dentition (Oral Motor)   Dentition (Oral Motor) adequate dentition   Facial Symmetry (Oral Motor)   Facial Symmetry (Oral Motor) unable/difficult to assess   Lip Function (Oral Motor)   Lip Range of Motion (Oral Motor) unable/difficult to assess   Tongue Function (Oral Motor)   Tongue ROM (Oral Motor) unable/difficult to assess   Jaw Function (Oral Motor)   Jaw Function (Oral Motor) WNL   Cough/Swallow/Gag Reflex (Oral Motor)   Volitional Throat Clear/Cough (Oral Motor) unable/difficult to assess   Vocal Quality/Secretion Management (Oral Motor)   Vocal Quality (Oral Motor) unable/difficult to assess   Secretion Management (Oral Motor) WNL   General Swallowing Observations   Current Diet/Method of Nutritional Intake (General Swallowing Observations, NIS) NPO   Respiratory Support (General Swallowing Observations) none   Past History of Dysphagia Pt known to SLP department. Per chart review 12/2022, pt has been tolerating puree diet and thin liquids. Most recently, 05/21/2023, bedside swallow eval completed warranting puree diet and thin liquids.   Swallowing Evaluation Clinical swallow evaluation   Clinical  Swallow Evaluation   Feeding Assistance dependent   Clinical Swallow Evaluation Textures Trialed pureed   Clinical Swallow Evaluation: Puree Solid Texture Trial   Mode of Presentation, Puree spoon;fed by clinician   Volume of Puree Presented 1 tsp   Oral Phase, Puree   (Severe bolus holding)   Pharyngeal Phase, Puree   (Absent swallow response)   Esophageal Phase of Swallow   Patient reports or presents with symptoms of esophageal dysphagia No   Swallowing Recommendations   Diet Consistency Recommendations NPO   Medication Administration Recommendations, Swallowing (SLP) OK crushed in puree   Instrumental Assessment Recommendations instrumental evaluation not recommended at this time   General Therapy Interventions   Planned Therapy Interventions Dysphagia Treatment   Dysphagia treatment Modified diet education;Compensatory strategies for swallowing;Instruction of safe swallow strategies   Clinical Impression   Criteria for Skilled Therapeutic Interventions Met (SLP Eval) Yes, treatment indicated   Risks & Benefits of therapy have been explained evaluation/treatment results reviewed;care plan/treatment goals reviewed;risks/benefits reviewed;current/potential barriers reviewed;participants voiced agreement with care plan;participants included;patient   Clinical Impression Comments Clinical swallow eval completed. Pt presents w/ moderate oropharyngeal dysphagia. Oral mech exam limited d/t pt's ability to follow commands. Pt assessed w/ puree. Oral phase remarkable for severe bolus holding. Absent swallow response noted. Despite encouragement, pt refused additional trials and pursed lips together. Recommend NPO status until further trials can be administered. Meds OK crushed in puree. Ensure pt is fully upright and alert, taking small bites at a slow rate. Please verify each swallow b/w bites. SLP to follow.   SLP Total Evaluation Time   Eval: oral/pharyngeal swallow function, clinical swallow Minutes (16105) 7   SLP  Goals   Therapy Frequency (SLP Eval) 5 times/wk   SLP Predicted Duration/Target Date for Goal Attainment 06/30/23   SLP Goals Swallow   SLP: Safely tolerate diet without signs/symptoms of aspiration Pureed diet;Thin liquids;With use of swallow precautions   Interventions   Interventions Quick Adds Swallowing Dysfunction   Swallowing Dysfunction &/or Oral Function for Feeding   Treatment Detail/Skilled Intervention Clinical swallow eval completed.   SLP Discharge Planning   SLP Plan diet tolerance, question if puree is baseline?   SLP Discharge Recommendation Transitional Care Facility;home with assist   SLP Rationale for DC Rec Dysphagia   SLP Brief overview of current status  Recommend NPO status until further trials can be administered. Meds OK crushed in puree. Ensure pt is fully upright and alert, taking small bites at a slow rate. Please verify each swallow b/w bites. SLP to follow.   Total Session Time   Total Session Time (sum of timed and untimed services) 7

## 2023-06-11 NOTE — H&P
[facility]    History and Physical - Medicine Service, [facility] TEAM        Date of Admission:  6/11/2023    Assessment & Plan      Per ED provider note, who spoke with medics and family, Isabell Matthews is a 62 year old female who has medical history type 1 diabetes, DKA, seizures, CVAs presenting with hyperglycemia and new DKA.    #DKA  #T1DM  Patient unable to provide any history, family not reachable. Most likely, DKA is secondary to dietary indiscretion. The patient is on insulin at home but it is unclear who administers it to her.  Multiple hospitalizations for DKA, most recent in May 2022. The patient's T1DM is described as Brittle, and glucose has been very labile in the past. The patient has been referred to endocrine in the past, however she appears to have missed at least two visits. On admission, her HCO3>10, pH>7.1, K+ 5.9. We will treat DKA with IV insulin and transition to subcutaneous as able.   - Admit to medicine  - Trend ketones  - Trend VBG  - Trend glucose  - Trend potassium  - Consider endocrinology consultation and further delineation of needs and capacity of the family to provide those prior to discharge.     #Altered Mental Status  #Seizures  #CVA  The patient presents essentially aphasic, confused but alert. She has a past medical history significant for strokes, with recent CTA showing critical M1/M2 stenosis and most recent hospitalization in May 2023 at neurology service. The patient is essentially aphasic but with intact comprehension and able to execute all commands. She is non-focal, and able to move all extremities, although admittedly very delayed in her responses. CT head with no changes. She does not appear to have any localizing sites of infection. Given the inability to get comprehensive history, we will perform limited infection work-up and treat DKA and electrolyte abnormalities as able. We will continue PTA meds. Speech evaluated  her at prior admission and recommended pureed diet (4) and thin liquids (0) with 1:1 supervision in upright positioning  - DKA as above  - T4 levels  - PTA aspirin  - PTA plavix  - PTA keppra  - PTA atorvastatin  - keppra levels  - TSH/T4   - Monitor for neurologic status improvement with DKA treatment  - Consider reconsulting speech while inpatient if indicate as patient discharged prior to achieving speech therapy goals on prior admit.  - Blood cultures  - UA    #CKD  #Hyperkalemia  #Pseudohyponatremia  Baseline creatinine ~1.7. Likely hypertension/diabetic nephropathy. Creatinine elevation secondary to dehydration. Pseudohyponatremia in the setting of profound hyperglycemia. Hyperkalemia in the setting of DKA  - Treat DKA  - Rehydrate  - Trend renal function  - Avoid nephrotoxic agents  - Completed bactrim for UTI on prior admission. No urinary symptoms this time.    #Hypothyroidism  - PTA levothyroxin  - TSH/T4      #Dementia  -  2/2 Vascular vs Alzheimer's.  On her 2021 MRI, no signs suggestive of CVA.  She did have microhemorrhages on SWI, but all of these were deep.    Diet: NPO for Medical/Clinical Reasons Except for: Meds, Ice Chips    DVT Prophylaxis: Mechanical  Parker Catheter: Not present  Fluids:N/S with IV insulin  Lines: None     Cardiac Monitoring: ACTIVE order. Indication: DKA  Code Status: Full Code      Clinically Significant Risk Factors Present on Admission        # Hyperkalemia: Highest K = 5.9 mmol/L in last 2 days, will monitor as appropriate  # Hyponatremia: Lowest Na = 128 mmol/L in last 2 days, will monitor as appropriate     # Anion Gap Metabolic Acidosis: Highest Anion Gap = 27 mmol/L in last 2 days, will monitor and treat as appropriate    # Drug Induced Platelet Defect: home medication list includes an antiplatelet medication   # Hypertension: Noted on problem list   # Dementia: noted on problem list             Disposition Plan      Expected Discharge Date: 06/13/2023                 The patient will be formally staffed this AM.    Kin Estrada MD  Medicine Service, [facility]  Securely message with [employer] (more info)  Text page via [facility] Paging/Directory   See signed in provider for up to date coverage information  ______________________________________________________________________    Chief Complaint   DKA    Unable to obtain a history from the patient due to mental status, unable to contact family to obtain collateral history.    History of Present Illness   Per ED provider note, who spoke with medics and family, Isabell Matthews is a 62 year old female who has medical history type 1 diabetes, DKA, seizures, CVAs presenting with hyperglycemia.  Patient was found by medics vomiting with an elevated glucose.  Glucose not readable by millimeter by medics. On discussion with daughter, patient reportedly with hi sugars for past 24 hours. Had different PCA overnight. Patient got extra units of insulin as patient was eating bagels which sends sugar quite high. At 230am, patient vomited and confused. Patient seen at bedisde by me and essentially aphasic. She can nod denying abdominal pain, chest pain, difficulty breathing, cough, fevers, chills or night sweats. She is able to move all extremities. Denies diarrhea.      Past Medical History    Past Medical History:   Diagnosis Date     Chronic pain      Coronary atherosclerosis 6/14/2016     Essential hypertension      Ex-cigarette smoker     quit 7/2018 over 35 years     Ex-cigarette smoker      Hyperlipidemia      Hypothyroid      Seizures (H)      Stroke (H)      Vascular dementia (H)        Past Surgical History   Past Surgical History:   Procedure Laterality Date     athroplasty hip Bilateral     2003, 2006     OTHER SURGICAL HISTORY      athroplasty hip     STENT         Prior to Admission Medications   Prior to Admission Medications   Prescriptions Last Dose Informant Patient  Reported? Taking?   DULoxetine (CYMBALTA) 20 MG capsule  Daughter No No   Sig: Take 1 capsule (20 mg) by mouth daily   METHYLCELLULOSE, LAXATIVE, PO   Yes No   Sig: Take 1 Tablespoonful by mouth daily as needed   SYNTHROID 75 MCG tablet  Daughter No No   Sig: Take 1 tablet (75 mcg) by mouth daily   TRUE METRIX BLOOD GLUCOSE TEST test strip  Daughter No No   Sig: USE TO TEST BLOOD SUGAR 4 TO 5 TIMES DAILY OR AS DIRECTED   Vitamin D3 (CHOLECALCIFEROL) 25 mcg (1000 units) tablet  Daughter No No   Sig: Take 1 tablet (25 mcg) by mouth daily   acetaminophen (TYLENOL) 500 MG tablet  Daughter Yes No   Sig: Take 500 mg by mouth every 6 hours as needed for mild pain   aspirin (ASA) 81 MG chewable tablet   No No   Sig: Take 1 tablet (81 mg) by mouth daily   atorvastatin (LIPITOR) 40 MG tablet  Daughter No No   Sig: Take 1 tablet (40 mg) by mouth daily   blood glucose monitoring (NO BRAND SPECIFIED) meter device kit  Daughter No No   Sig: Use to test blood sugar 4 times daily.  Please provide glucose meter that is covered by insurance.   carvedilol (COREG) 12.5 MG tablet  Daughter No No   Sig: Take 1 tablet (12.5 mg) by mouth 2 times daily (with meals)   clopidogrel (PLAVIX) 75 MG tablet   No No   Si tablet (75 mg) by Oral or NG Tube route daily   cyanocobalamin (VITAMIN B-12) 1000 MCG tablet  Daughter No No   Sig: Take 1 tablet (1,000 mcg) by mouth daily   diclofenac (VOLTAREN) 1 % topical gel  Daughter No No   Sig: Apply 2 g topically 4 times daily as needed for moderate pain   doxazosin (CARDURA) 1 MG tablet  Daughter No No   Sig: Take 1 tablet (1 mg) by mouth At Bedtime   gabapentin (NEURONTIN) 100 MG capsule  Daughter No No   Sig: Take 1 capsule (100 mg) by mouth At Bedtime   insulin glargine (LANTUS PEN) 100 UNIT/ML pen   Yes No   Sig: Inject 22 Units Subcutaneous At Bedtime   insulin lispro (HUMALOG KWIKPEN) 100 UNIT/ML (1 unit dial) KWIKPEN  Daughter No No   Sig: Inject 1 unit glucose 200-250 2 units 251-300 3 units  if above 300 USE 9-12 UNITS PER DAY Strength: 100 UNIT/ML   insulin pen needle (31G X 8 MM) 31G X 8 MM miscellaneous  Daughter No No   Sig: Use 4 pen needles daily or as directed.   levETIRAcetam (KEPPRA) 500 MG tablet   No No   Sig: Take 1 tablet (500 mg) by mouth 2 times daily   loratadine (CLARITIN) 10 mg tablet  Daughter Yes No   Sig: [LORATADINE (CLARITIN) 10 MG TABLET] Take 10 mg by mouth daily.   melatonin 10 mg Tab  Daughter Yes No   Sig: Take 10 mg by mouth nightly as needed   nystatin (MYCOSTATIN) 902952 UNIT/GM external cream  Daughter No No   Sig: Apply topically 2 times daily Until rash clear   sulfamethoxazole-trimethoprim (BACTRIM DS) 800-160 MG tablet   No No   Sig: Take 1 tablet by mouth 2 times daily   zinc oxide (DESITIN) 20 % external ointment  Daughter No No   Sig: Apply topically as needed for dry skin or irritation      Facility-Administered Medications: None        Physical Exam   Vital Signs: Temp: 97.8  F (36.6  C) Temp src: Oral BP: (!) 149/71 Pulse: 108   Resp: 17 SpO2: 99 % O2 Device: None (Room air)    Weight: 0 lbs 0 oz    Constitutional: awake, alert, uncooperative and no apparent distress  Respiratory: No increased work of breathing, good air exchange, clear to auscultation bilaterally, no crackles or wheezing  Cardiovascular: Normal apical impulse, regular rate and rhythm, normal S1 and S2, no S3 or S4, and no murmur noted and no edema  GI: No scars, normal bowel sounds, soft, non-distended, non-tender, no masses palpated, no hepatosplenomegally  Neurologic: Mental Status Exam:  Level of Alertness:   awake  Orientation:   person  Language:  abnormal - essentially aphasic, able to execute simple commands  Cranial Nerves:  cranial nerves II-XII are grossly intact  Motor Exam:  Motor exam is symmetrical 5 out of 5 all extremities bilaterally  Sensory:  Unable to assess  Coordination:  Gait:  Deferred    Medical Decision Making       Please see A&P for additional details of medical  decision making.      Data   ------------------------- PAST 24 HR DATA REVIEWED -----------------------------------------------    I have personally reviewed the following data over the past 24 hrs:    8.0  \   11.1 (L)   / 179     128 (L) 89 (L) 73.6 (H) /  >600 (HH)   5.9 (H) 12 (L) 1.93 (H) \       ALT: 29 AST: 25 AP: 170 (H) TBILI: 0.4   ALB: 4.1 TOT PROTEIN: 7.1 LIPASE: N/A       Imaging results reviewed over the past 24 hrs:   Recent Results (from the past 24 hour(s))   CT Head w/o Contrast    Narrative    EXAM: CT HEAD W/O CONTRAST  LOCATION: [facility]  DATE/TIME: 6/11/2023 4:53 AM CDT    INDICATION: Altered mental status. Recent CVA. Confusion.  COMPARISON: MRI brain dated 05/20/2023.  TECHNIQUE: Routine CT Head without IV contrast. Multiplanar reformats. Dose reduction techniques were used.    FINDINGS:  INTRACRANIAL CONTENTS: No definite acute hemorrhage, extra axial fluid collection or mass/mass effect. Stable chronic right basal ganglia and bilateral thalamic lacunar type infarctions. The patient's previously seen infarcts within the right centrum   semiovale and right of midline chuck are not well appreciated on this examination. Chronic left subinsular/external capsular infarct. No new infarct identified. Mild presumed chronic small vessel ischemic changes.  Mild to moderate generalized volume   loss. No hydrocephalus.     VISUALIZED ORBITS/SINUSES/MASTOIDS: Prior bilateral cataract surgery. Visualized portions of the orbits are otherwise unremarkable. No paranasal sinus mucosal disease. No middle ear or mastoid effusion.    BONES/SOFT TISSUES: No acute abnormality.      Impression    IMPRESSION:  1.  No definite acute intracranial process. If the patient is experiencing an acute, focal or ongoing neurologic deficit, an MRI may be indicated.  2.  Chronic senescent and ischemic changes, as above.

## 2023-06-11 NOTE — PROCEDURES
[facility]    Double Lumen PICC Placement    Date/Time: 6/11/2023 1:55 PM    Performed by: Beatris Gudino RN  Authorized by: Felicia Ridley MD  Indications: vascular access      UNIVERSAL PROTOCOL   Site Marked: Yes  Prior Images Obtained and Reviewed:  Yes  Required items: Required blood products, implants, devices and special equipment available    Patient identity confirmed:  Arm band  NA - No sedation, light sedation, or local anesthesia  Confirmation Checklist:  Patient's identity using two indicators  Time out: Immediately prior to the procedure a time out was called    Universal Protocol: the Joint Commission Universal Protocol was followed    Preparation: Patient was prepped and draped in usual sterile fashion       ANESTHESIA    Anesthesia: Local infiltration  Local Anesthetic:  Lidocaine 1% without epinephrine  Anesthetic Total (mL):  5      SEDATION    Patient Sedated: No        Preparation: skin prepped with ChloraPrep  Skin prep agent: skin prep agent completely dried prior to procedure  Sterile barriers: maximum sterile barriers were used: cap, mask, sterile gown, sterile gloves, and large sterile sheet  Hand hygiene: hand hygiene performed prior to central venous catheter insertion  Type of line used: PICC  Catheter type: double lumen  Lumen type: non-valved and power PICC  Lumen Identification: Red and Purple  Catheter size: 5 Fr  Brand: Bard  Lot number: [id number]  Placement method: venipuncture, MST, ultrasound and tip navigation system  Number of attempts: 1  Difficulty threading catheter: no  Successful placement: yes  Orientation: right  Catheter to Vein (%): 36  Location: basilic vein (vd 0.50 cm)  Tip Location: SVC  Arm circumference: adults 10 cm  Extremity circumference: 25  Visible catheter length: 3  Total catheter length: 39  Dressing and securement: adhesive securement device, alcohol impregnated caps, blood cleaned  with CHG, chlorhexidine disc applied, dressing applied, glue, line secured, securement device, site cleansed, sterile dressing applied, subcutaneous anchor securement system, transparent securement dressing, transparent dressing and tissue adhesive  Post procedure assessment: placement verified by 3CG technology, free fluid flow and blood return through all ports  PROCEDURE Describe Procedure: PICC ok to use  Disposal: sharps and needle count correct at the end of procedure, needles and guidewire disposed in sharps container

## 2023-06-11 NOTE — ED NOTES
Bed: ED26  Expected date:   Expected time:   Means of arrival:   Comments:  62F  High BG  20g LH  Hx multiple strokes with deficits

## 2023-06-11 NOTE — ED PROVIDER NOTES
ED Provider Note  [facility]      History     Chief Complaint   Patient presents with     Hyperglycemia     HPI  Isabell Matthews is a 62 year old female who has medical history type 1 diabetes, DKA, seizures, CVAs presenting with hyperglycemia.  Patient was found by medics vomiting with an elevated glucose.  Glucose not readable by millimeter by medics.  Patient currently denies pain.  Unclear who gives the patient insulin, presumably family.  Further history limited due to altered mental status.  Unclear baseline at this point.    On discussion with daughter, patient reportedly with hi sugars for past 24 hours. Had different PCA overnight. Patient got extra units of insulin as patient was eating bagels which sends sugar quite high. At 230am, patient vomited and confused. At baseline, patient usually slow to respond, slightly slower tonight prior to coming into the ED.    Past Medical History  Past Medical History:   Diagnosis Date     Chronic pain      Coronary atherosclerosis 6/14/2016     Essential hypertension      Ex-cigarette smoker     quit 7/2018 over 35 years     Ex-cigarette smoker      Hyperlipidemia      Hypothyroid      Seizures (H)      Stroke (H)      Vascular dementia (H)      Past Surgical History:   Procedure Laterality Date     athroplasty hip Bilateral     2003, 2006     OTHER SURGICAL HISTORY      athroplasty hip     STENT       acetaminophen (TYLENOL) 500 MG tablet  aspirin (ASA) 81 MG chewable tablet  atorvastatin (LIPITOR) 40 MG tablet  blood glucose monitoring (NO BRAND SPECIFIED) meter device kit  carvedilol (COREG) 12.5 MG tablet  clopidogrel (PLAVIX) 75 MG tablet  cyanocobalamin (VITAMIN B-12) 1000 MCG tablet  diclofenac (VOLTAREN) 1 % topical gel  doxazosin (CARDURA) 1 MG tablet  DULoxetine (CYMBALTA) 20 MG capsule  gabapentin (NEURONTIN) 100 MG capsule  insulin glargine (LANTUS PEN) 100 UNIT/ML pen  insulin lispro (HUMALOG KWIKPEN) 100 UNIT/ML (1 unit dial)  KWIKPEN  insulin pen needle (31G X 8 MM) 31G X 8 MM miscellaneous  levETIRAcetam (KEPPRA) 500 MG tablet  loratadine (CLARITIN) 10 mg tablet  melatonin 10 mg Tab  METHYLCELLULOSE, LAXATIVE, PO  nystatin (MYCOSTATIN) 564172 UNIT/GM external cream  sulfamethoxazole-trimethoprim (BACTRIM DS) 800-160 MG tablet  SYNTHROID 75 MCG tablet  TRUE METRIX BLOOD GLUCOSE TEST test strip  Vitamin D3 (CHOLECALCIFEROL) 25 mcg (1000 units) tablet  zinc oxide (DESITIN) 20 % external ointment      Allergies   Allergen Reactions     Seasonal Allergies      Family History  Family History   Problem Relation Age of Onset     Acute Myocardial Infarction Father      Heart Disease Maternal Grandmother      Dementia Maternal Grandmother      Myocardial Infarction Father      Dementia Paternal Grandmother      Heart Disease Paternal Grandmother      Social History   Social History     Tobacco Use     Smoking status: Former     Packs/day: 0.50     Years: 35.00     Pack years: 17.50     Types: Cigarettes     Quit date: 2018     Years since quittin.9     Smokeless tobacco: Never   Substance Use Topics     Alcohol use: Not Currently     Drug use: Not Currently         A medically appropriate review of systems was performed with pertinent positives and negatives noted in the HPI, and all other systems negative.    Physical Exam   BP: (!) 148/72  Pulse: 101  Temp: 97.8  F (36.6  C)  Resp: 17  SpO2: 99 %  Physical Exam  Physical Exam   Constitutional: Alert, follows commands  HENT:   Head: Normocephalic and atraumatic.   Neck: Normal range of motion. No nuchal rigidity  Pulmonary/Chest: Effort normal. No respiratory distress. Clear lungs bilaterally  Cardiac: No murmurs, rubs, gallops. RRR.  Abdominal: Abdomen soft, nontender, nondistended. No rebound tenderness.  MSK: Long bones without deformity or evidence of trauma.  No lower extremity edema  Neurological: Alert.  Moves all extremities to command.   Skin: Skin is warm and dry.         ED  Course, Procedures, & Data      Procedures       ED Course Selections:        EKG Interpretation:      Interpreted by Stone Bocanegra MD  Time reviewed: 1895  Symptoms at time of EKG: AMS   Rhythm: sinus tachycardia  Rate: Tachycardia  Axis: Normal  Ectopy: none  Conduction: normal  ST Segments/ T Waves: Non-specific ST-T wave changes  Q Waves: inferior leads  Comparison to prior: Largely unchanged from prior EKG    Clinical Impression: No acute signs suggesting ischemia or infarction, no new peaked T waves                           Results for orders placed or performed during the hospital encounter of 06/11/23   [place] Draw     Status: None ()    Narrative    The following orders were created for panel order [place] Draw.  Procedure                               Abnormality         Status                     ---------                               -----------         ------                     Extra Blue Top Tube[[id number]]                                                         Extra Red Top Tube[[id number]]                                                          Extra Purple Top Tube[[id number]]                                                         Please view results for these tests on the individual orders.     Medications   0.9% sodium chloride BOLUS (1,000 mLs Intravenous $New Bag 6/11/23 7105)     Followed by   sodium chloride 0.9% infusion (has no administration in time range)     Labs Ordered and Resulted from Time of ED Arrival to Time of ED Departure - No data to display  No orders to display          Critical care was performed.   Critical Care Addendum  My initial assessment, based on my review of prehospital provider report, review of vital signs, focused history and physical exam, established a high suspicion that Isabell Matthews has DKA, which requires immediate intervention, and therefore she is critically ill.     After the initial assessment, the care team initiated IV fluid administration and  initiated medication therapy with insulin gtt to provide stabilization care. Due to the critical nature of this patient, I reassessed vital signs and mental status multiple times prior to her disposition.     Time also spent performing documentation, reviewing test results and discussion with consultants.     Critical care time (excluding teaching time and procedures): 30 minutes.     Assessment & Plan    MDM  Patient presenting with altered mental status and hyperglycemia.  She likely has DKA based on labs.  We did give her fluids and insulin.  Potassium is high likely related to hyperglycemia.  This likely will correct after fluids and insulin, will defer further shifting.  EKG without significant changes suggestive of hyper-K such as peaked T waves.  Patient admitted to medicine for further care.  Head CT without acute findings.  After discussion with the daughter it seems like her altered mental status is more or less her baseline at this point.    I have reviewed the nursing notes. I have reviewed the findings, diagnosis, plan and need for follow up with the patient.    New Prescriptions    No medications on file       Final diagnoses:   Diabetic ketoacidosis without coma associated with type 1 diabetes mellitus (H)       Stone Bocanegra  [facility] EMERGENCY DEPARTMENT  6/11/2023     Stone Bocanegra MD  06/11/23 0713

## 2023-06-12 ENCOUNTER — APPOINTMENT (OUTPATIENT)
Dept: SPEECH THERAPY | Facility: CLINIC | Age: 63
DRG: 639 | End: 2023-06-12
Payer: COMMERCIAL

## 2023-06-12 LAB
ANION GAP SERPL CALCULATED.3IONS-SCNC: 10 MMOL/L (ref 7–15)
B-OH-BUTYR SERPL-SCNC: 0.3 MMOL/L
BUN SERPL-MCNC: 45.4 MG/DL (ref 8–23)
CALCIUM SERPL-MCNC: 8.4 MG/DL (ref 8.8–10.2)
CHLORIDE SERPL-SCNC: 115 MMOL/L (ref 98–107)
CREAT SERPL-MCNC: 1.5 MG/DL (ref 0.51–0.95)
DEPRECATED HCO3 PLAS-SCNC: 19 MMOL/L (ref 22–29)
ERYTHROCYTE [DISTWIDTH] IN BLOOD BY AUTOMATED COUNT: 14.5 % (ref 10–15)
GFR SERPL CREATININE-BSD FRML MDRD: 38 ML/MIN/1.73M2
GLUCOSE BLDC GLUCOMTR-MCNC: 105 MG/DL (ref 70–99)
GLUCOSE BLDC GLUCOMTR-MCNC: 106 MG/DL (ref 70–99)
GLUCOSE BLDC GLUCOMTR-MCNC: 108 MG/DL (ref 70–99)
GLUCOSE BLDC GLUCOMTR-MCNC: 113 MG/DL (ref 70–99)
GLUCOSE BLDC GLUCOMTR-MCNC: 115 MG/DL (ref 70–99)
GLUCOSE BLDC GLUCOMTR-MCNC: 115 MG/DL (ref 70–99)
GLUCOSE BLDC GLUCOMTR-MCNC: 116 MG/DL (ref 70–99)
GLUCOSE BLDC GLUCOMTR-MCNC: 133 MG/DL (ref 70–99)
GLUCOSE BLDC GLUCOMTR-MCNC: 135 MG/DL (ref 70–99)
GLUCOSE BLDC GLUCOMTR-MCNC: 137 MG/DL (ref 70–99)
GLUCOSE BLDC GLUCOMTR-MCNC: 146 MG/DL (ref 70–99)
GLUCOSE BLDC GLUCOMTR-MCNC: 181 MG/DL (ref 70–99)
GLUCOSE BLDC GLUCOMTR-MCNC: 187 MG/DL (ref 70–99)
GLUCOSE BLDC GLUCOMTR-MCNC: 202 MG/DL (ref 70–99)
GLUCOSE BLDC GLUCOMTR-MCNC: 219 MG/DL (ref 70–99)
GLUCOSE BLDC GLUCOMTR-MCNC: 247 MG/DL (ref 70–99)
GLUCOSE BLDC GLUCOMTR-MCNC: 75 MG/DL (ref 70–99)
GLUCOSE BLDC GLUCOMTR-MCNC: 82 MG/DL (ref 70–99)
GLUCOSE BLDC GLUCOMTR-MCNC: 82 MG/DL (ref 70–99)
GLUCOSE BLDC GLUCOMTR-MCNC: 84 MG/DL (ref 70–99)
GLUCOSE BLDC GLUCOMTR-MCNC: 93 MG/DL (ref 70–99)
GLUCOSE SERPL-MCNC: 116 MG/DL (ref 70–99)
HCT VFR BLD AUTO: 31.7 % (ref 35–47)
HGB BLD-MCNC: 10.4 G/DL (ref 11.7–15.7)
HOLD SPECIMEN: NORMAL
MCH RBC QN AUTO: 30 PG (ref 26.5–33)
MCHC RBC AUTO-ENTMCNC: 32.8 G/DL (ref 31.5–36.5)
MCV RBC AUTO: 91 FL (ref 78–100)
PLATELET # BLD AUTO: 174 10E3/UL (ref 150–450)
POTASSIUM SERPL-SCNC: 3.6 MMOL/L (ref 3.4–5.3)
RBC # BLD AUTO: 3.47 10E6/UL (ref 3.8–5.2)
SODIUM SERPL-SCNC: 144 MMOL/L (ref 136–145)
WBC # BLD AUTO: 9.1 10E3/UL (ref 4–11)

## 2023-06-12 PROCEDURE — 120N000002 HC R&B MED SURG/OB UMMC

## 2023-06-12 PROCEDURE — 82962 GLUCOSE BLOOD TEST: CPT

## 2023-06-12 PROCEDURE — 80048 BASIC METABOLIC PNL TOTAL CA: CPT

## 2023-06-12 PROCEDURE — 250N000012 HC RX MED GY IP 250 OP 636 PS 637: Performed by: STUDENT IN AN ORGANIZED HEALTH CARE EDUCATION/TRAINING PROGRAM

## 2023-06-12 PROCEDURE — 250N000009 HC RX 250: Performed by: STUDENT IN AN ORGANIZED HEALTH CARE EDUCATION/TRAINING PROGRAM

## 2023-06-12 PROCEDURE — 92526 ORAL FUNCTION THERAPY: CPT | Mod: GN

## 2023-06-12 PROCEDURE — 250N000009 HC RX 250

## 2023-06-12 PROCEDURE — 36415 COLL VENOUS BLD VENIPUNCTURE: CPT

## 2023-06-12 PROCEDURE — 250N000011 HC RX IP 250 OP 636

## 2023-06-12 PROCEDURE — 99233 SBSQ HOSP IP/OBS HIGH 50: CPT | Performed by: INTERNAL MEDICINE

## 2023-06-12 PROCEDURE — 99232 SBSQ HOSP IP/OBS MODERATE 35: CPT | Performed by: INTERNAL MEDICINE

## 2023-06-12 PROCEDURE — 85027 COMPLETE CBC AUTOMATED: CPT

## 2023-06-12 PROCEDURE — 82010 KETONE BODYS QUAN: CPT

## 2023-06-12 RX ORDER — LEVETIRACETAM 5 MG/ML
500 INJECTION INTRAVASCULAR DAILY
Status: DISCONTINUED | OUTPATIENT
Start: 2023-06-12 | End: 2023-06-12

## 2023-06-12 RX ORDER — DEXTROSE MONOHYDRATE 25 G/50ML
25-50 INJECTION, SOLUTION INTRAVENOUS
Status: DISCONTINUED | OUTPATIENT
Start: 2023-06-12 | End: 2023-06-15 | Stop reason: HOSPADM

## 2023-06-12 RX ORDER — DEXTROSE MONOHYDRATE 25 G/50ML
25-50 INJECTION, SOLUTION INTRAVENOUS
Status: DISCONTINUED | OUTPATIENT
Start: 2023-06-12 | End: 2023-06-12

## 2023-06-12 RX ORDER — NICOTINE POLACRILEX 4 MG
15-30 LOZENGE BUCCAL
Status: DISCONTINUED | OUTPATIENT
Start: 2023-06-12 | End: 2023-06-15 | Stop reason: HOSPADM

## 2023-06-12 RX ORDER — LEVETIRACETAM 5 MG/ML
500 INJECTION INTRAVASCULAR 2 TIMES DAILY
Status: DISCONTINUED | OUTPATIENT
Start: 2023-06-12 | End: 2023-06-13

## 2023-06-12 RX ORDER — NICOTINE POLACRILEX 4 MG
15-30 LOZENGE BUCCAL
Status: DISCONTINUED | OUTPATIENT
Start: 2023-06-12 | End: 2023-06-12

## 2023-06-12 RX ORDER — DEXTROSE MONOHYDRATE 100 MG/ML
INJECTION, SOLUTION INTRAVENOUS CONTINUOUS PRN
Status: DISCONTINUED | OUTPATIENT
Start: 2023-06-12 | End: 2023-06-15

## 2023-06-12 RX ADMIN — LEVETIRACETAM 500 MG: 5 INJECTION INTRAVENOUS at 20:15

## 2023-06-12 RX ADMIN — DEXTROSE MONOHYDRATE AND SODIUM CHLORIDE: 5; .45 INJECTION, SOLUTION INTRAVENOUS at 03:23

## 2023-06-12 RX ADMIN — INSULIN GLARGINE 20 UNITS: 100 INJECTION, SOLUTION SUBCUTANEOUS at 13:07

## 2023-06-12 RX ADMIN — LEVETIRACETAM 500 MG: 5 INJECTION INTRAVENOUS at 11:38

## 2023-06-12 RX ADMIN — HUMAN INSULIN 1.5 UNITS/HR: 100 INJECTION, SOLUTION SUBCUTANEOUS at 17:05

## 2023-06-12 RX ADMIN — HUMAN INSULIN 1.5 UNITS/HR: 100 INJECTION, SOLUTION SUBCUTANEOUS at 13:15

## 2023-06-12 RX ADMIN — HUMAN INSULIN 3 UNITS/HR: 100 INJECTION, SOLUTION SUBCUTANEOUS at 16:13

## 2023-06-12 RX ADMIN — DEXTROSE MONOHYDRATE AND SODIUM CHLORIDE: 5; .45 INJECTION, SOLUTION INTRAVENOUS at 16:32

## 2023-06-12 ASSESSMENT — ACTIVITIES OF DAILY LIVING (ADL)
ADLS_ACUITY_SCORE: 39
ADLS_ACUITY_SCORE: 39
ADLS_ACUITY_SCORE: 47
ADLS_ACUITY_SCORE: 41
ADLS_ACUITY_SCORE: 47
ADLS_ACUITY_SCORE: 35
ADLS_ACUITY_SCORE: 41

## 2023-06-12 NOTE — PROVIDER NOTIFICATION
Page to Ronald Hernández    Patient sliding scaled insulin ordered.  Pt continues to be on insulin drip.  Insulin drip needs to be discontinued before administering.  Thanks

## 2023-06-12 NOTE — PROGRESS NOTES
Physician Attestation   I, Felicia Ridley MD, was present with the medical/BULMAR[employer] student who participated in the service and in the documentation of the note.  I have verified the history and personally performed the physical exam and medical decision making.  I agree with the assessment and plan of care as documented in the note.      Key findings: My edits in blue.      Felicia Ridley MD  Date of Service (when I saw the patient): 06/12/23    [facility]    Progress Note - Medicine Service, [facility] TEAM 1    Date of Admission:  6/11/2023    Assessment & Plan   Isabell Matthews is a 62 year old female who has medical history T1DM, DKA, seizures, CVAs with baseline aphasia admitted for hyperglycemia and DKA, now with normal anion gap and WNL ketones.    Updates Today:  - Start subcutaneous insulin and discontinue insulin drip (endocrine managing)  - Continue IV dextrose until taking PO (pending SLP eval for diet)    #DKA likely triggered by high carbohydrate load meal in the setting of a history of T1DM  Patient unable to provide any history. Most likely, DKA is secondary to dietary indiscretion. The patient is on insulin at home.  Multiple hospitalizations for DKA, most recent in May 2022. The patient's T1DM is described as Brittle, and glucose has been very labile in the past. The patient has been referred to endocrine in the past, however she appears to have missed at least two visits. On admission, her HCO3 10, pH 7.1, K+ 5.9. We will treat DKA with IV insulin and transition to subcutaneous as able.  - Daily BMP, monitor potassium closely  - Trend glucose  - Endocrine consulted, recs appreciated  - 6/12 - start Lantus 20 units q24h, and discontinue insulin gtt 4 hours after Lantus administered   - Medium sliding scale   - Novolog 1U/15 grams carbs    #Altered Mental Status  #Seizures  #CVA  The patient presents essentially aphasic, confused but alert.  She has a past medical history significant for strokes, with recent CTA showing critical M1/M2 stenosis and most recent hospitalization in May 2023 at neurology service. The patient is essentially aphasic but with intact comprehension and able to execute all commands. She is non-focal, and able to move all extremities, although admittedly very delayed in her responses. CT head with no changes. She does not appear to have any localizing sites of infection. Given the inability to get comprehensive history, we will perform limited infection work-up and treat DKA and electrolyte abnormalities as able. We will continue PTA meds. Speech evaluated her at prior admission and recommended pureed diet (4) and thin liquids (0) with 1:1 supervision in upright positioning  - Treat DKA as above, monitor for neurological status improvement  - PTA Aspirin  - PTA Plavix  - PTA Atorvastatin  - PTA Keppra   - Keppra levels - returned low, IV ordered until able to reliably take PO  - SLP consult placed  - Blood cultures NGTD  - UA unremarkable    #CKD  #Hyperkalemia, resolved  #Pseudohyponatremia, resolved  Baseline creatinine ~1.7. Likely hypertension/diabetic nephropathy. Creatinine elevation secondary to dehydration. Pseudohyponatremia in the setting of profound hyperglycemia. Hyperkalemia in the setting of DKA  - Treat DKA  - Rehydrate  - Trend renal function  - Avoid nephrotoxic agents    #Hypothyroidism  - PTA levothyroxin    #Dementia  -  2/2 Vascular vs Alzheimer's.  On her 2021 MRI, no signs suggestive of CVA.  She did have microhemorrhages on SWI, but all of these were deep.       Diet: NPO for Medical/Clinical Reasons Except for: Meds, Ice Chips    DVT Prophylaxis: Mechanical  Parker Catheter: Not present  Fluids: D5 + 1/2NS  Lines: PRESENT      PICC 06/11/23 Double Lumen Right Basilic picc ok to use-Site Assessment: WDL      Cardiac Monitoring: None  Code Status: Full Code      Clinically Significant Risk Factors        #  Hyperkalemia: Highest K = 5.9 mmol/L in last 2 days, will monitor as appropriate  # Hyponatremia: Lowest Na = 128 mmol/L in last 2 days, will monitor as appropriate  # Hypocalcemia: Lowest Ca = 8.4 mg/dL in last 2 days, will monitor and replace as appropriate    # Anion Gap Metabolic Acidosis: Highest Anion Gap = 29 mmol/L in last 2 days, will monitor and treat as appropriate      # Hypertension: Noted on problem list     # Dementia: noted on problem list             Disposition Plan      Expected Discharge Date: 06/15/2023                The patient's care was discussed with the Attending Physician, Dr. Ridley.    Ronald Hernández  Medical Student  Medicine Service, [facility] TEAM 1  [facility]  Securely message with [employer] (more info)  Text page via [employer] Paging/Directory   See signed in provider for up to date coverage information     ______________________________________________________________________    Interval History   No overnight events.  Patient reports feeling fatigued, but otherwise has no complaints.  She has no abdominal pain, nausea, or trouble breathing.    Physical Exam   Vital Signs: Temp: 97.9  F (36.6  C) Temp src: Oral BP: 133/58 Pulse: 76   Resp: 18 SpO2: 97 % O2 Device: None (Room air)    Weight: 126 lbs 8 oz  General: Awake, alert, no acute distress  HEENT: NCAT  Cardiac: normal rate and regular rhythm, no murmurs  Respiratory: clear to ascultation bilaterally, no increased work of breathing  GI: Soft, non-tender, non-distended, BS+  Neuro: moving all extremities spontaneously, occasionally able to speak in short/simple phrases  MSK: No lower extremity edema    Data     I have personally reviewed the following data over the past 24 hrs:    9.1  \   10.4 (L)   / 174     144 115 (H) 45.4 (H) /  219 (H)   3.6 19 (L) 1.50 (H) \       Imaging results reviewed over the past 24 hrs:   Recent Results (from the past 24 hour(s))   XR Chest Port 1 View     Narrative    EXAM: XR CHEST PORT 1 VIEW  6/11/2023 3:56 PM     HISTORY:  PICC placement       COMPARISON:  12/11/2022    FINDINGS:   Right upper extremity PICC with the distal tip in the high right  atrium. Trachea is midline. Cardiomediastinal silhouette and pulmonary  vasculature are within normal limits. Atherosclerotic calcifications  of the aortic knob. No focal airspace opacity, pleural effusion or  appreciable pneumothorax.    No acute osseous abnormality. Visualized upper abdomen is  unremarkable.        Impression    IMPRESSION:   Right upper extremity PICC with the distal tip in the high right  atrium. No appreciable pneumothorax.    I have personally reviewed the examination and initial interpretation  and I agree with the findings.    JAY JAY CARMEN MD         SYSTEM ID:  [id number]

## 2023-06-12 NOTE — PROGRESS NOTES
Diabetes Consult Daily  Progress Note          Assessment/Plan:     1. DKA - resolved  #T1DM    - Start Lantus 20 units q24h starting today. Will continue insulin drip for 4 hours after receiving Lantus and then stop.  - aspart 1/15 g CHO  - aspart MDSSI correction Q4h   - BG checks as per the insulin drip protocol. Once the insulin drip is stopped then recommend to check Q4H.  - hypoglycemia protocol   -Diabetes education needs will be assessed closer to discharge  -on discharge, will recommend outpatient follow up with [facility] service    Plan discussed with patient, bedside RN, and primary team.           Interval History:     The last 24 hours progress and nursing notes reviewed.  Patient continues to be NPO for evaluation by speech. BG have been stable on insulin drip.  Plan to transition of the insulin drip today    Recent Labs   Lab 06/12/23  1400 06/12/23  1305 06/12/23  1206 06/12/23  1100 06/12/23  0953 06/12/23  0902   * 187* 115* 135* 133* 181*           Nutrition:     Orders Placed This Encounter      NPO for Medical/Clinical Reasons Except for: Meds, Ice Chips    Supplements: None        PTA Regimen:   As per last note by her primary care, she was taking lantus 18 units at home and aspart 4 units with meal + sliding scale            Review of Systems:   See interval hx          Medications:   no steroid         Physical Exam:     /58 (BP Location: Left arm)   Pulse 76   Temp 97.9  F (36.6  C) (Oral)   Resp 18   Wt 57.4 kg (126 lb 8 oz)   SpO2 97%   BMI 21.05 kg/m           Data:     Lab Results   Component Value Date    A1C 10.8 05/20/2023    A1C 11.2 03/29/2023    A1C 10.3 10/31/2022    A1C 8.9 05/03/2022    A1C 8.8 04/05/2022    A1C 7.8 06/21/2021    A1C 11.7 09/27/2020            Lab Results   Component Value Date    CREAT 1.7 (H) 06/21/2021         CBC RESULTS:   Recent Labs   Lab Test 06/12/23  0541   WBC 9.1   RBC 3.47*   HGB 10.4*   HCT  31.7*   MCV 91   MCH 30.0   MCHC 32.8   RDW 14.5        Recent Labs   Lab Test 06/12/23  1400 06/12/23  1305 06/12/23  0208 06/12/23  0155 06/11/23  2200 06/11/23  2158   NA  --   --   --  144  --  143   POTASSIUM  --   --   --  3.6  --  3.6   CHLORIDE  --   --   --  115*  --  114*   CO2  --   --   --  19*  --  19*   ANIONGAP  --   --   --  10  --  10   * 187*   < > 116*   < > 121*   BUN  --   --   --  45.4*  --  48.2*   CR  --   --   --  1.50*  --  1.49*   VERONICA  --   --   --  8.4*  --  8.7*    < > = values in this interval not displayed.       Patient was discussed with On call attending    Lindsey Hennessy  Endocrinology Fellow  [phone]      To contact Endocrine Diabetes service:   From 8AM-4PM: page inpatient diabetes provider that is following the patient that day (see filed or incomplete progress notes/consult notes).  If uncertain of provider assignment: page job code [phone].  For questions or updates from 4PM-8AM: page the diabetes job code for on call fellow: [phone]    Please notify inpatient diabetes service if changes are planned that will impact glycemia, such as changes to steroids, enteral feeding, parenteral feeding, dextrose fluids or procedures requiring prolonged NPO status.

## 2023-06-12 NOTE — PLAN OF CARE
Goal Outcome Evaluation:    /58 (BP Location: Left arm)   Pulse 76   Temp 97.9  F (36.6  C) (Oral)   Resp 18   Wt 57.4 kg (126 lb 8 oz)   SpO2 97%   BMI 21.05 kg/m      Patient afebrile, all other vitals within normal limits, alert to self and place, disoriented to situation, and time, assist of 2, purewick in place, incontinent of stool, insulin drip at algorithm 3, blood sugar q1, 247, 181, 133, 135, 115, 187, 219, 202, NPO, patient evaluated by speech see note.    .Melissa Douglas RN on 6/12/2023 at 3:07 PM

## 2023-06-12 NOTE — PROGRESS NOTES
/64 (BP Location: Right arm)   Pulse 73   Temp 98.3  F (36.8  C) (Oral)   Resp 15   SpO2 98%     Assume cares from [id number]  Admitted from ED this morning. Alert to self and place only. Denies pain or nausea. Insulin drip restarted at 0630 infusing 0.5 ml/hr, Algorithm 1. NPO except for ice chips  Purewick in place with adequate urine output, No BM this shift. Neuro check every 4 hours. PICC double lumen heparin lock and PIV x2. D5 and 0.45NaCl infusing at 75 ml/hr. Partial bath given and CHG wipes done. Next BG due at 0730. Will cont to monitor.      Admitted/transferred from: ED   Time of arrival on unit 0430  2 RN full  skin assessment completed by Tg LUU  Skin assessment finding: skin intact, no problems. PIV x2 and PICC double lumen.   Interventions/actions: partial bath and  CHG wipes done.    Will continue to monitor.

## 2023-06-12 NOTE — PROVIDER NOTIFICATION
#0882    While waiting for SLP consult, AM PO meds wont be given with risk for aspiration. Trying to get a time for SLP eval. If any meds important, they will need to be IV. Thanks

## 2023-06-13 LAB
ANION GAP SERPL CALCULATED.3IONS-SCNC: 9 MMOL/L (ref 7–15)
BUN SERPL-MCNC: 25.1 MG/DL (ref 8–23)
CALCIUM SERPL-MCNC: 8.2 MG/DL (ref 8.8–10.2)
CHLORIDE SERPL-SCNC: 111 MMOL/L (ref 98–107)
CREAT SERPL-MCNC: 1.39 MG/DL (ref 0.51–0.95)
DEPRECATED HCO3 PLAS-SCNC: 19 MMOL/L (ref 22–29)
ERYTHROCYTE [DISTWIDTH] IN BLOOD BY AUTOMATED COUNT: 14.6 % (ref 10–15)
GFR SERPL CREATININE-BSD FRML MDRD: 42 ML/MIN/1.73M2
GLUCOSE BLDC GLUCOMTR-MCNC: 120 MG/DL (ref 70–99)
GLUCOSE BLDC GLUCOMTR-MCNC: 120 MG/DL (ref 70–99)
GLUCOSE BLDC GLUCOMTR-MCNC: 127 MG/DL (ref 70–99)
GLUCOSE BLDC GLUCOMTR-MCNC: 129 MG/DL (ref 70–99)
GLUCOSE BLDC GLUCOMTR-MCNC: 129 MG/DL (ref 70–99)
GLUCOSE BLDC GLUCOMTR-MCNC: 142 MG/DL (ref 70–99)
GLUCOSE BLDC GLUCOMTR-MCNC: 153 MG/DL (ref 70–99)
GLUCOSE BLDC GLUCOMTR-MCNC: 178 MG/DL (ref 70–99)
GLUCOSE BLDC GLUCOMTR-MCNC: 322 MG/DL (ref 70–99)
GLUCOSE SERPL-MCNC: 139 MG/DL (ref 70–99)
HCT VFR BLD AUTO: 31.1 % (ref 35–47)
HGB BLD-MCNC: 9.6 G/DL (ref 11.7–15.7)
MCH RBC QN AUTO: 28.7 PG (ref 26.5–33)
MCHC RBC AUTO-ENTMCNC: 30.9 G/DL (ref 31.5–36.5)
MCV RBC AUTO: 93 FL (ref 78–100)
PLATELET # BLD AUTO: 152 10E3/UL (ref 150–450)
POTASSIUM SERPL-SCNC: 3.4 MMOL/L (ref 3.4–5.3)
RBC # BLD AUTO: 3.34 10E6/UL (ref 3.8–5.2)
SODIUM SERPL-SCNC: 139 MMOL/L (ref 136–145)
WBC # BLD AUTO: 5.6 10E3/UL (ref 4–11)

## 2023-06-13 PROCEDURE — 250N000011 HC RX IP 250 OP 636

## 2023-06-13 PROCEDURE — 80048 BASIC METABOLIC PNL TOTAL CA: CPT | Performed by: INTERNAL MEDICINE

## 2023-06-13 PROCEDURE — 85027 COMPLETE CBC AUTOMATED: CPT | Performed by: INTERNAL MEDICINE

## 2023-06-13 PROCEDURE — 250N000013 HC RX MED GY IP 250 OP 250 PS 637

## 2023-06-13 PROCEDURE — 99231 SBSQ HOSP IP/OBS SF/LOW 25: CPT | Performed by: INTERNAL MEDICINE

## 2023-06-13 PROCEDURE — 250N000012 HC RX MED GY IP 250 OP 636 PS 637: Performed by: STUDENT IN AN ORGANIZED HEALTH CARE EDUCATION/TRAINING PROGRAM

## 2023-06-13 PROCEDURE — 120N000002 HC R&B MED SURG/OB UMMC

## 2023-06-13 PROCEDURE — 250N000009 HC RX 250: Performed by: INTERNAL MEDICINE

## 2023-06-13 PROCEDURE — 99233 SBSQ HOSP IP/OBS HIGH 50: CPT | Performed by: STUDENT IN AN ORGANIZED HEALTH CARE EDUCATION/TRAINING PROGRAM

## 2023-06-13 PROCEDURE — 250N000013 HC RX MED GY IP 250 OP 250 PS 637: Performed by: INTERNAL MEDICINE

## 2023-06-13 PROCEDURE — 250N000013 HC RX MED GY IP 250 OP 250 PS 637: Performed by: STUDENT IN AN ORGANIZED HEALTH CARE EDUCATION/TRAINING PROGRAM

## 2023-06-13 PROCEDURE — 999N000007 HC SITE CHECK

## 2023-06-13 PROCEDURE — 250N000012 HC RX MED GY IP 250 OP 636 PS 637: Performed by: INTERNAL MEDICINE

## 2023-06-13 RX ORDER — IBUPROFEN 200 MG
200 TABLET ORAL 3 TIMES DAILY
Status: CANCELLED | OUTPATIENT
Start: 2023-06-13

## 2023-06-13 RX ORDER — LEVETIRACETAM 100 MG/ML
500 SOLUTION ORAL 2 TIMES DAILY
Status: DISCONTINUED | OUTPATIENT
Start: 2023-06-13 | End: 2023-06-15 | Stop reason: HOSPADM

## 2023-06-13 RX ORDER — ACETAMINOPHEN 325 MG/1
975 TABLET ORAL EVERY 8 HOURS PRN
Status: DISCONTINUED | OUTPATIENT
Start: 2023-06-13 | End: 2023-06-15 | Stop reason: HOSPADM

## 2023-06-13 RX ADMIN — DEXTROSE MONOHYDRATE AND SODIUM CHLORIDE: 5; .45 INJECTION, SOLUTION INTRAVENOUS at 17:24

## 2023-06-13 RX ADMIN — LEVETIRACETAM 500 MG: 100 SOLUTION ORAL at 20:10

## 2023-06-13 RX ADMIN — INSULIN ASPART 2 UNITS: 100 INJECTION, SOLUTION INTRAVENOUS; SUBCUTANEOUS at 17:16

## 2023-06-13 RX ADMIN — DEXTROSE MONOHYDRATE AND SODIUM CHLORIDE: 5; .45 INJECTION, SOLUTION INTRAVENOUS at 05:56

## 2023-06-13 RX ADMIN — Medication 12.5 MG: at 09:24

## 2023-06-13 RX ADMIN — LEVETIRACETAM 500 MG: 5 INJECTION INTRAVENOUS at 09:25

## 2023-06-13 RX ADMIN — CLOPIDOGREL BISULFATE 75 MG: 75 TABLET, FILM COATED ORAL at 09:25

## 2023-06-13 RX ADMIN — INSULIN GLARGINE 20 UNITS: 100 INJECTION, SOLUTION SUBCUTANEOUS at 14:21

## 2023-06-13 RX ADMIN — ATORVASTATIN CALCIUM 40 MG: 40 TABLET, FILM COATED ORAL at 20:10

## 2023-06-13 RX ADMIN — ACETAMINOPHEN 975 MG: 325 TABLET, FILM COATED ORAL at 18:50

## 2023-06-13 RX ADMIN — Medication 12.5 MG: at 20:10

## 2023-06-13 RX ADMIN — LEVOTHYROXINE SODIUM 75 MCG: 100 SOLUTION ORAL at 09:24

## 2023-06-13 RX ADMIN — ASPIRIN 81 MG CHEWABLE TABLET 81 MG: 81 TABLET CHEWABLE at 09:23

## 2023-06-13 ASSESSMENT — ACTIVITIES OF DAILY LIVING (ADL)
ADLS_ACUITY_SCORE: 41
DEPENDENT_IADLS:: CLEANING;COOKING;LAUNDRY;SHOPPING;MEAL PREPARATION;MEDICATION MANAGEMENT;TRANSPORTATION;MONEY MANAGEMENT;INCONTINENCE
ADLS_ACUITY_SCORE: 41
DEPENDENT_IADLS:: CLEANING;COOKING;LAUNDRY;SHOPPING;MEAL PREPARATION;MEDICATION MANAGEMENT;TRANSPORTATION;MONEY MANAGEMENT;INCONTINENCE
ADLS_ACUITY_SCORE: 41
DEPENDENT_IADLS:: CLEANING;COOKING;LAUNDRY;SHOPPING;MEAL PREPARATION;MEDICATION MANAGEMENT;TRANSPORTATION;MONEY MANAGEMENT;INCONTINENCE

## 2023-06-13 NOTE — PLAN OF CARE
[id number]    A&Ox2, disoriented to time and situation. Hypertensive within parameters, OVSS on RA. New pain this morning in left hand, arm, and abdomen, MD aware. PICC in R arm, PIVx1 in R arm. Blood sugar remained in 120s for this shift. Neuro checks intact. One large BM this shift, purewick changed and in place for urinary incontinence. Denies nausea/SOB. Assist x1-2, bed alarm on. Continue w/POC.

## 2023-06-13 NOTE — PLAN OF CARE
Goal Outcome Evaluation    Time: 2117-9981    Reason for admission: DKA without coma  Activity: A x2  Pain: denies  Neuro: Alert to self and place only. Disoriented to time and situation. Q4 hrs neuros maintained.  Cardiac: WDL  Respiratory: WDL  GI/: Jean changed this shift, incontinent of B&B  Diet: NPO  Lines: Right PICC- HL and TKO, PIV-infusing  Labs/imaging: Reviewed, no replacements indicated this shift.   Vitals: BP slightly elevated at 1524, otherwise WDL  Max Temp: 98.6      New changes this shift: Insulin drip discontinued at 1800. Hourly blood sugars continue d/t BG remaining below 100-150. D5 continuous maintained. Refused all PO medication. Slept most of shift.       Continue to monitor and follow POC

## 2023-06-13 NOTE — PROGRESS NOTES
Physician Attestation   I, Carmen Driscoll MD, was present with the medical/BULMARO student who participated in the service and in the documentation of the note.  I have verified the history and personally performed the physical exam and medical decision making.  I agree with the assessment and plan of care as documented in the note.      Date of Service (when I saw the patient): 06/13/23    [facility]    Progress Note - Medicine Service, INDER TEAM 1    Date of Admission:  6/11/2023    Assessment & Plan   Isabell Mtathews is a 62 year old female who has medical history T1DM, DKA, seizures, CVAs with baseline aphasia admitted for hyperglycemia and DKA, now with normal anion gap and WNL ketones.    Updates Today:  - Changed to Pureed + thin liquids diet    #DKA likely triggered by high carbohydrate load meal in the setting of a history of T1DM  Patient unable to provide any history. Most likely, DKA is secondary to dietary indiscretion. The patient is on insulin at home.  Multiple hospitalizations for DKA, most recent in May 2022. The patient's T1DM is described as Brittle, and glucose has been very labile in the past. The patient has been referred to endocrine in the past, however she appears to have missed at least two visits. On admission, her HCO3 10, pH 7.1, K+ 5.9. We will treat DKA with IV insulin and transition to subcutaneous as able.  - Daily BMP, monitor potassium closely  - Trend glucose  - Endocrine consulted, recs appreciated  - Lantus 20 units q24h   - Medium sliding scale   - Novolog 1U/15 grams carbs   - Previously on insulin gtt 6/11-6/12    #Altered Mental Status  #Seizures  #CVA  The patient presents essentially aphasic, confused but alert. She has a past medical history significant for strokes, with recent CTA showing critical M1/M2 stenosis and most recent hospitalization in May 2023 at neurology service. The patient is essentially aphasic  but with intact comprehension and able to execute all commands. She is non-focal, and able to move all extremities, although admittedly very delayed in her responses. CT head with no changes. She does not appear to have any localizing sites of infection. Given the inability to get comprehensive history, we will perform limited infection work-up and treat DKA and electrolyte abnormalities as able. We will continue PTA meds. Speech evaluated her at prior admission and recommended pureed diet (4) and thin liquids (0) with 1:1 supervision in upright positioning  - Treat DKA as above, monitor for neurological status improvement  - PTA Aspirin  - PTA Plavix (ordered as suspension)  - PTA Atorvastatin  - PTA Keppra (ordered as suspension)  - SLP consult placed  - Blood cultures NGTD  - UA unremarkable    #CKD  #Hyperkalemia, resolved  #Pseudohyponatremia, resolved  Baseline creatinine ~1.7. Likely hypertension/diabetic nephropathy. Creatinine elevation secondary to dehydration. Pseudohyponatremia in the setting of profound hyperglycemia. Hyperkalemia in the setting of DKA  - Treat DKA  - Rehydrate  - Trend renal function  - Avoid nephrotoxic agents    #Hypothyroidism  - PTA levothyroxine (ordered as suspension)    #Dementia  -  2/2 Vascular vs Alzheimer's.  On her 2021 MRI, no signs suggestive of CVA.  She did have microhemorrhages on SWI, but all of these were deep.       Diet: Combination Diet Regular Diet; Pureed Diet (level 4); Thin Liquids (level 0)  DVT Prophylaxis: Mechanical  Parker Catheter: Not present  Fluids: D5 + 1/2NS  Lines: PRESENT      PICC 06/11/23 Double Lumen Right Basilic picc ok to use-Site Assessment: WDL    Cardiac Monitoring: None  Code Status: No CPR- Do NOT Intubate      Clinically Significant Risk Factors                  # Hypertension: Noted on problem list     # Dementia: noted on problem list             Disposition Plan      Expected Discharge Date: 06/15/2023                The patient's  care was discussed with the Attending Physician, Dr. Driscoll.    Ronald Hernández  Medical Student  Medicine [facility], [facility]  [facility]  Securely message with Fastnote (more info)  Text page via [employer] Paging/Directory   See signed in provider for up to date coverage information     ______________________________________________________________________    Interval History   No overnight events.  Patient reports still feeling fatigued, but has no pain, shortness of breath, abdominal pain, nausea, or vomiting.  No other complaints.    Physical Exam   Vital Signs: Temp: 98.4  F (36.9  C) Temp src: Oral BP: (!) 157/78 Pulse: 77   Resp: 16 SpO2: 99 % O2 Device: None (Room air)    Weight: 131 lbs 0 oz  General: Awake, alert, no acute distress  HEENT: NCAT  Cardiac: normal rate and regular rhythm, no murmurs  Respiratory: clear to ascultation bilaterally, no increased work of breathing  GI: Soft, non-tender, non-distended, BS+  Neuro: Alert and oriented to self and place but not time or situation.  Moving all extremities spontaneously, able to speak in short/simple phrases.  MSK: No lower extremity edema    Data     I have personally reviewed the following data over the past 24 hrs:    5.6  \   9.6 (L)   / 152     139 111 (H) 25.1 (H) /  178 (H)   3.4 19 (L) 1.39 (H) \       Imaging results reviewed over the past 24 hrs:   No results found for this or any previous visit (from the past 24 hour(s)).

## 2023-06-13 NOTE — PROGRESS NOTES
CLINICAL NUTRITION SERVICES - ASSESSMENT NOTE     Nutrition Prescription    RECOMMENDATIONS FOR MDs/PROVIDERS TO ORDER:  None at present.    Malnutrition Status:    Patient does not meet two of the established criteria necessary for diagnosing malnutrition but is at risk for malnutrition    Recommendations already ordered by Registered Dietitian (RD):  Ordered Glucerna once daily, will assess tolerance.    Future/Additional Recommendations:  Monitor intake, weight, need for supplements.     REASON FOR ASSESSMENT  Isabell Matthews is a/an 62 year old female assessed by the dietitian for Admission Nutrition Risk Screen for positive - unsure wt loss and decreased appetite.    NUTRITION HISTORY  Chart reviewed.  Pt with PMH of DM1, DKA, seizures, CVAs with baseline aphasia presenting with hyperglycemia and new DKA. Most likely, DKA is secondary to dietary indiscretion. Endocrine following.  Pt NPO per SLP until further trials can be administered.    Met with pt.  She doesn't remember if her appetite was lower than normal prior to admit. She has no flavor preferences for supplements if she does try them in the future.    CURRENT NUTRITION ORDERS  Diet: Pureed Diet (level 4) (6/13 - ). NPO (6/11-6/13).  Intake/Tolerance: No intakes recorded this admit.    LABS  Labs reviewed   Latest Reference Range & Units 06/13/23 05:06   Sodium 136 - 145 mmol/L 139   Potassium 3.4 - 5.3 mmol/L 3.4   Chloride 98 - 107 mmol/L 111 (H)   Carbon Dioxide (CO2) 22 - 29 mmol/L 19 (L)   Urea Nitrogen 8.0 - 23.0 mg/dL 25.1 (H)   Creatinine 0.51 - 0.95 mg/dL 1.39 (H)   GFR Estimate >60 mL/min/1.73m2 42 (L)   Calcium 8.8 - 10.2 mg/dL 8.2 (L)   Anion Gap 7 - 15 mmol/L 9      Latest Reference Range & Units 06/13/23 04:08 06/13/23 05:33 06/13/23 07:24   GLUCOSE BY METER POCT 70 - 99 mg/dL 129 (H) 127 (H) 142 (H)   5/23 A1C 10.8 (H).    MEDICATIONS  Medications reviewed  Novolog - medium insulin resistance  Lantus  Levothyroxine  D5 0.45 NaCl gtt, NS  "gtt    ANTHROPOMETRICS  Height: 165.1 cm (5' 5\")  Most Recent Weight: 59.4 kg (131 lb)    IBW: 56.8 kg (105% IBW)  BMI: 21.8 kg/m2 - Normal BMI  Weight History:   06/13/23 59.4 kg (131 lb)   03/15/23 57.7 kg (127 lb 1.6 oz)   12/11/22 56.7 kg (125 lb)   10/31/22 56 kg (123 lb 8 oz)   05/29/22 63.5 kg (140 lb)   05/14/22 63.9 kg (140 lb 14 oz)   04/07/22 63.3 kg (139 lb 8.8 oz)   10/25/21 72.6 kg (160 lb)   6.5% wt loss in 1 year.    Dosing Weight: 59.4 kg (actual wt)    ASSESSED NUTRITION NEEDS  Estimated Energy Needs: 1485 - 1782 kcals/day (25 - 30 kcals/kg)  Justification: Maintenance  Estimated Protein Needs: 71 - 89 grams protein/day (1.2 - 1.5 grams of pro/kg)  Justification: Increased needs  Estimated Fluid Needs: 1485 - 1782 mL/day (1 mL/kcal)   Justification: Maintenance    PHYSICAL FINDINGS  See malnutrition section below.    MALNUTRITION  % Intake: Decreased intake does not meet criteria  % Weight Loss: Weight loss does not meet criteria  Subcutaneous Fat Loss: None observed  Muscle Loss: Temporal:  moderate, Upper arm (bicep, tricep):  moderate, Lower arm  (forearm):  moderate, Dorsal hand:  moderate, Upper leg (quadricep, hamstring):  severe and Posterior calf:  severe  Fluid Accumulation/Edema: None noted  Malnutrition Diagnosis: Patient does not meet two of the established criteria necessary for diagnosing malnutrition but is at risk for malnutrition    NUTRITION DIAGNOSIS  Inadequate oral intake related to no oral intake since admit as evidenced by patient previously NPO for DKA management and per SLP.      INTERVENTIONS  Implementation  Nutrition Education: RD role in care.     Goals  Patient to consume % of nutritionally adequate meal trays TID, or the equivalent with supplements/snacks.     Monitoring/Evaluation  Progress toward goals will be monitored and evaluated per protocol.    Juma Cruz, Clinical Dietitian  6A/7D RD pager: [phone]  Weekend/Holiday RD pager: [phone]      "

## 2023-06-13 NOTE — PROGRESS NOTES
Care Management Initial Consult    General Information  Assessment completed with: Family (daughter Felton)   Primary Care Provider verified and updated as needed:     Readmission within the last 30 days: Yes  Advance Care Planning:    DNR/DNI     Communication Assessment  Patient's communication style: spoken language (English or Bilingual)    Hearing Difficulty or Deaf: no   Wear Glasses or Blind: no  Cognitive -Per Daughter nothing has improved  Living Environment:   People in home: child(tiffany), adult   Felton stays with mom half the time and her sister Vishal stays the other Both are PCA's for their mom.  Current living Arrangements: apartment   - [address] - No grab bars. - she needs full assista  Able to return to prior arrangements: yes     Family/Social Support:  Care provided by: child(tiffany) (Holger her daughter is pt's PCA)  Provides care for: no one, unable/limited ability to care for self         Description of Support System:    Pt's daughters care for pt.      Current Resources:   Patient receiving home care services: No  Community Resources: County Worker  Equipment currently used at home: walker, standard, wheelchair, manual, shower chair, transfer belt. Blood pressure machine  Supplies currently used at home: Other (walker, wheel chair, incontinence supplies, shower chair)    Employment/Financial:  Employment Status:     [employer]  Financial Concerns: No concerns identified    Does the patient's insurance plan have a 3 day qualifying hospital stay waiver?  No    Lifestyle & Psychosocial Needs:  Social Determinants of Health     Tobacco Use: Medium Risk (6/11/2023)    Patient History      Smoking Tobacco Use: Former      Smokeless Tobacco Use: Never      Passive Exposure: Not on file   Alcohol Use: Not on file   Financial Resource Strain: Not on file   Food Insecurity: Not on file   Transportation Needs: Not on file   Physical Activity: Not on file   Stress: Not on file   Social Connections: Not on  file   Intimate Partner Violence: Not on file   Depression: At risk (11/2/2020)    PHQ-2      PHQ-2 Score: 4   Housing Stability: Not on file       Functional Status:  Prior to admission patient needed assistance:   Dependent ADLs:: Ambulation-walker house, Wheelchair (out)-with assist, Toileting, Incontinence, Dressing, Bathing, Transfers  Dependent IADLs:: Cleaning, Cooking, Laundry, Shopping, Meal Preparation, Medication Management, Transportation, Money Management, Incontinence     Mental Health Status:    Dementia  - doesn't talk that much.   Since latest strokes pt has had a harder time swollowing it is difficult       Chemical Dependency Status:      N/A        Values/Beliefs:  Spiritual, Cultural Beliefs, [employer] Practices, Values that affect care:                 Additional Information:    Per MD notes: Isabell Matthews is a 62 year old female who has medical history T1DM, DKA, seizures, CVAs with baseline aphasia admitted for hyperglycemia and DKA, now with normal anion gap and WNL ketones.    SW talked w/pt's daughter via phone. Pt's daughters take turns caring for their mother in her apartment.  Pt's daughter said that pt is having a hard time swallowing after her latest strokes.  SW asked if Felton had a legal documentation that gave her and her sister legal say over pt's medical choices. Felton said they do not. Pt is not capable of making medical decisions.  Felton told SW that her and her sister want pt on DNR/DNI. She told SW tat she has had to ask for this change every time her mom is admitted to the hospital. They do not want their mom to suffer and she feels that CPR or incubation would cause her mm to suffer.   KINZA called the MD and asked about changing the Code status and let them know that the daughter would like the change in pt's chart, so they don't have to ask for it every time she is admitted.   Pt's Code status has been changed to No CPR-Do NOT intubate     SW to follow and assist with any  other discharge needs that may arise.  MONICA Frederick   7D    Phone: [phone]  Pager: [phone]

## 2023-06-13 NOTE — PLAN OF CARE
Problem: Oral Intake Inadequate  Goal: Improved Oral Intake  Outcome: Progressing   Goal Outcome Evaluation:       See RD note for full assessment.

## 2023-06-13 NOTE — PLAN OF CARE
Goal Outcome Evaluation:    BP (!) 173/56 (BP Location: Left arm)   Pulse 80   Temp 98.5  F (36.9  C) (Oral)   Resp 18   Wt 59.4 kg (131 lb)   SpO2 95%   BMI 21.80 kg/m       9563-1105    Patient hypertensive, afebrile, all other vitals within normal limits, q 4 neuros, alert to self and place, disoriented to time and situation, assist of 2, purewick in place, incontinent of stool, blood sugar checks q4, BS-142, 178, NPO    .Melissa Douglas, RN on 6/13/2023 at 2:18 PM

## 2023-06-13 NOTE — PROGRESS NOTES
Diabetes Consult Daily  Progress Note          Assessment/Plan:     1. DKA - resolved  #T1DM    Patient is of the insulin drip since yesterday evening. Bg have been pretty stable.   She is currently on D5-1/2 NS running at 75 ml/hr.    - Continue Lantus 20 units q24h.  - aspart 1/15 g CHO  - aspart MDSSI correction Q4h   - BG checks as per the insulin drip protocol. Once the insulin drip is stopped then recommend to check Q4H.  - hypoglycemia protocol   -Diabetes education needs will be assessed closer to discharge  -on discharge, will recommend outpatient follow up with [facility] Endocrinology service             Interval History:     The last 24 hours progress and nursing notes reviewed.  Patient continues to be NPO for evaluation by speech. BG have been stable on MDI.    Recent Labs   Lab 06/13/23  1154 06/13/23  0724 06/13/23  0533 06/13/23  0506 06/13/23  0408 06/13/23  0201   * 142* 127* 139* 129* 129*           Nutrition:     Orders Placed This Encounter      Combination Diet Regular Diet; Pureed Diet (level 4); Thin Liquids (level 0)    Supplements: None        PTA Regimen:   As per last note by her primary care, she was taking lantus 18 units at home and aspart 4 units with meal + sliding scale            Review of Systems:   See interval hx          Medications:   no steroid         Physical Exam:   General: sleeping comfortably on bed  RS: breathing normally  BP (!) 173/56 (BP Location: Left arm)   Pulse 80   Temp 98.5  F (36.9  C) (Oral)   Resp 18   Wt 59.4 kg (131 lb)   SpO2 95%   BMI 21.80 kg/m           Data:     Lab Results   Component Value Date    A1C 10.8 05/20/2023    A1C 11.2 03/29/2023    A1C 10.3 10/31/2022    A1C 8.9 05/03/2022    A1C 8.8 04/05/2022    A1C 7.8 06/21/2021    A1C 11.7 09/27/2020            Lab Results   Component Value Date    CREAT 1.7 (H) 06/21/2021         CBC RESULTS:   Recent Labs   Lab Test 06/12/23  0541   WBC 9.1   RBC 3.47*   HGB  10.4*   HCT 31.7*   MCV 91   MCH 30.0   MCHC 32.8   RDW 14.5        Recent Labs   Lab Test 06/12/23  1400 06/12/23  1305 06/12/23  0208 06/12/23  0155 06/11/23  2200 06/11/23  2158   NA  --   --   --  144  --  143   POTASSIUM  --   --   --  3.6  --  3.6   CHLORIDE  --   --   --  115*  --  114*   CO2  --   --   --  19*  --  19*   ANIONGAP  --   --   --  10  --  10   * 187*   < > 116*   < > 121*   BUN  --   --   --  45.4*  --  48.2*   CR  --   --   --  1.50*  --  1.49*   VERONICA  --   --   --  8.4*  --  8.7*    < > = values in this interval not displayed.       Patient was discussed with On call attending    Lindsey Hennessy  Endocrinology Fellow  [phone]      To contact Endocrine Diabetes service:   From 8AM-4PM: page inpatient diabetes provider that is following the patient that day (see filed or incomplete progress notes/consult notes).  If uncertain of provider assignment: page job code [phone].  For questions or updates from 4PM-8AM: page the diabetes job code for on call fellow: [phone]    Please notify inpatient diabetes service if changes are planned that will impact glycemia, such as changes to steroids, enteral feeding, parenteral feeding, dextrose fluids or procedures requiring prolonged NPO status.

## 2023-06-14 ENCOUNTER — APPOINTMENT (OUTPATIENT)
Dept: SPEECH THERAPY | Facility: CLINIC | Age: 63
DRG: 639 | End: 2023-06-14
Payer: COMMERCIAL

## 2023-06-14 LAB
ANION GAP SERPL CALCULATED.3IONS-SCNC: 11 MMOL/L (ref 7–15)
BUN SERPL-MCNC: 17.5 MG/DL (ref 8–23)
CALCIUM SERPL-MCNC: 8.5 MG/DL (ref 8.8–10.2)
CHLORIDE SERPL-SCNC: 107 MMOL/L (ref 98–107)
CREAT SERPL-MCNC: 1.38 MG/DL (ref 0.51–0.95)
DEPRECATED HCO3 PLAS-SCNC: 20 MMOL/L (ref 22–29)
ERYTHROCYTE [DISTWIDTH] IN BLOOD BY AUTOMATED COUNT: 14.4 % (ref 10–15)
GFR SERPL CREATININE-BSD FRML MDRD: 43 ML/MIN/1.73M2
GLUCOSE BLDC GLUCOMTR-MCNC: 121 MG/DL (ref 70–99)
GLUCOSE BLDC GLUCOMTR-MCNC: 136 MG/DL (ref 70–99)
GLUCOSE BLDC GLUCOMTR-MCNC: 136 MG/DL (ref 70–99)
GLUCOSE BLDC GLUCOMTR-MCNC: 167 MG/DL (ref 70–99)
GLUCOSE BLDC GLUCOMTR-MCNC: 247 MG/DL (ref 70–99)
GLUCOSE BLDC GLUCOMTR-MCNC: 299 MG/DL (ref 70–99)
GLUCOSE SERPL-MCNC: 136 MG/DL (ref 70–99)
HCT VFR BLD AUTO: 33.2 % (ref 35–47)
HGB BLD-MCNC: 10.6 G/DL (ref 11.7–15.7)
MCH RBC QN AUTO: 29.1 PG (ref 26.5–33)
MCHC RBC AUTO-ENTMCNC: 31.9 G/DL (ref 31.5–36.5)
MCV RBC AUTO: 91 FL (ref 78–100)
PLATELET # BLD AUTO: 153 10E3/UL (ref 150–450)
POTASSIUM SERPL-SCNC: 4.1 MMOL/L (ref 3.4–5.3)
RBC # BLD AUTO: 3.64 10E6/UL (ref 3.8–5.2)
SODIUM SERPL-SCNC: 138 MMOL/L (ref 136–145)
WBC # BLD AUTO: 4.7 10E3/UL (ref 4–11)

## 2023-06-14 PROCEDURE — 250N000013 HC RX MED GY IP 250 OP 250 PS 637: Performed by: STUDENT IN AN ORGANIZED HEALTH CARE EDUCATION/TRAINING PROGRAM

## 2023-06-14 PROCEDURE — 250N000011 HC RX IP 250 OP 636

## 2023-06-14 PROCEDURE — 250N000009 HC RX 250: Performed by: INTERNAL MEDICINE

## 2023-06-14 PROCEDURE — 36415 COLL VENOUS BLD VENIPUNCTURE: CPT | Performed by: INTERNAL MEDICINE

## 2023-06-14 PROCEDURE — 120N000002 HC R&B MED SURG/OB UMMC

## 2023-06-14 PROCEDURE — 80048 BASIC METABOLIC PNL TOTAL CA: CPT | Performed by: INTERNAL MEDICINE

## 2023-06-14 PROCEDURE — 99232 SBSQ HOSP IP/OBS MODERATE 35: CPT | Performed by: STUDENT IN AN ORGANIZED HEALTH CARE EDUCATION/TRAINING PROGRAM

## 2023-06-14 PROCEDURE — 250N000013 HC RX MED GY IP 250 OP 250 PS 637

## 2023-06-14 PROCEDURE — 92526 ORAL FUNCTION THERAPY: CPT | Mod: GN

## 2023-06-14 PROCEDURE — 99232 SBSQ HOSP IP/OBS MODERATE 35: CPT | Performed by: INTERNAL MEDICINE

## 2023-06-14 PROCEDURE — 250N000013 HC RX MED GY IP 250 OP 250 PS 637: Performed by: INTERNAL MEDICINE

## 2023-06-14 PROCEDURE — 85027 COMPLETE CBC AUTOMATED: CPT | Performed by: INTERNAL MEDICINE

## 2023-06-14 RX ORDER — CALCIUM CARBONATE 500 MG/1
500 TABLET, CHEWABLE ORAL DAILY PRN
Status: DISCONTINUED | OUTPATIENT
Start: 2023-06-14 | End: 2023-06-15 | Stop reason: HOSPADM

## 2023-06-14 RX ORDER — PROCHLORPERAZINE MALEATE 5 MG
5 TABLET ORAL EVERY 6 HOURS PRN
Status: DISCONTINUED | OUTPATIENT
Start: 2023-06-14 | End: 2023-06-15 | Stop reason: HOSPADM

## 2023-06-14 RX ADMIN — ATORVASTATIN CALCIUM 40 MG: 40 TABLET, FILM COATED ORAL at 20:15

## 2023-06-14 RX ADMIN — ASPIRIN 81 MG CHEWABLE TABLET 81 MG: 81 TABLET CHEWABLE at 08:41

## 2023-06-14 RX ADMIN — INSULIN GLARGINE 20 UNITS: 100 INJECTION, SOLUTION SUBCUTANEOUS at 12:34

## 2023-06-14 RX ADMIN — Medication 12.5 MG: at 08:42

## 2023-06-14 RX ADMIN — LEVETIRACETAM 500 MG: 100 SOLUTION ORAL at 08:42

## 2023-06-14 RX ADMIN — CLOPIDOGREL BISULFATE 75 MG: 75 TABLET, FILM COATED ORAL at 08:41

## 2023-06-14 RX ADMIN — ACETAMINOPHEN 975 MG: 325 TABLET, FILM COATED ORAL at 20:15

## 2023-06-14 RX ADMIN — DEXTROSE MONOHYDRATE AND SODIUM CHLORIDE: 5; .45 INJECTION, SOLUTION INTRAVENOUS at 00:51

## 2023-06-14 RX ADMIN — PROCHLORPERAZINE EDISYLATE 5 MG: 5 INJECTION INTRAMUSCULAR; INTRAVENOUS at 22:02

## 2023-06-14 RX ADMIN — Medication 1 MG: at 01:02

## 2023-06-14 RX ADMIN — LEVOTHYROXINE SODIUM 75 MCG: 100 SOLUTION ORAL at 08:41

## 2023-06-14 RX ADMIN — Medication 12.5 MG: at 20:16

## 2023-06-14 RX ADMIN — LEVETIRACETAM 500 MG: 100 SOLUTION ORAL at 20:16

## 2023-06-14 ASSESSMENT — ACTIVITIES OF DAILY LIVING (ADL)
ADLS_ACUITY_SCORE: 41

## 2023-06-14 NOTE — PROGRESS NOTES
Physician Attestation   I, Carmen Driscoll MD, was present with the medical/BULMARO student who participated in the service and in the documentation of the note.  I have verified the history and personally performed the physical exam and medical decision making.  I agree with the assessment and plan of care as documented in the note.      Date of Service (when I saw the patient): 06/14/23    [facility]    Progress Note - Medicine Service, INDER TEAM 1    Date of Admission:  6/11/2023    Assessment & Plan   Isabell Matthews is a 62 year old female who has medical history T1DM, DKA, seizures, CVAs with baseline aphasia admitted for hyperglycemia and DKA, now with normal anion gap and WNL ketones.    Updates Today:  - D5W+1/2NS fluid stopped  - Patient tolerating diet well    #DKA likely triggered by high carbohydrate load meal in the setting of a history of T1DM, Resolved  Patient unable to provide any history upon admission. Most likely, DKA is secondary to dietary indiscretion. The patient is on insulin at home.  Multiple hospitalizations for DKA, most recent in May 2022. The patient's T1DM is described as Brittle, and glucose has been very labile in the past. The patient has been referred to endocrine in the past, however she appears to have missed at least two visits. On admission, her HCO3 10, pH 7.1, K+ 5.9. We will treat DKA with IV insulin and transition to subcutaneous as able.  - Daily BMP, monitor potassium closely  - Trend glucose  - Endocrine consulted, recs appreciated  - Lantus 20 units q24h   - Medium sliding scale   - Novolog 1U/15 grams carbs   - Previously on insulin gtt 6/11-6/12    #Altered Mental Status, now at baseline  #Seizures  #CVA  The patient presents essentially aphasic, confused but alert. She has a past medical history significant for strokes, with recent CTA showing critical M1/M2 stenosis and most recent hospitalization in May  2023 at neurology service. The patient is essentially aphasic but with intact comprehension and able to execute all commands. She is non-focal, and able to move all extremities, although admittedly very delayed in her responses. CT head with no changes. She does not appear to have any localizing sites of infection. Given the inability to get comprehensive history, we will perform limited infection work-up and treat DKA and electrolyte abnormalities as able. We will continue PTA meds. Speech evaluated her at prior admission and recommended pureed diet (4) and thin liquids (0) with 1:1 supervision in upright positioning  - PTA Aspirin  - PTA Plavix (ordered as suspension)  - PTA Atorvastatin  - PTA Keppra (ordered as suspension)  - SLP consult placed  - Blood cultures NGTD  - UA unremarkable    #CKD  #Hyperkalemia, resolved  #Pseudohyponatremia, resolved  Baseline creatinine ~1.7. Likely hypertension/diabetic nephropathy. Creatinine elevation secondary to dehydration. Pseudohyponatremia in the setting of profound hyperglycemia. Hyperkalemia in the setting of DKA.  - Rehydrate  - Trend renal function  - Avoid nephrotoxic agents    #Hypothyroidism  - PTA levothyroxine (ordered as suspension)    #Dementia  -  2/2 Vascular vs Alzheimer's.  On her 2021 MRI, no signs suggestive of CVA.  She did have microhemorrhages on SWI, but all of these were deep.       Diet: Combination Diet Regular Diet; Pureed Diet (level 4); Thin Liquids (level 0)  Snacks/Supplements Adult: Magic Cup; With Meals  Snacks/Supplements Adult: Glucerna; With Meals  DVT Prophylaxis: Mechanical  Parker Catheter: Not present  Fluids: D5 + 1/2NS  Lines: PRESENT      PICC 06/11/23 Double Lumen Right Basilic picc ok to use-Site Assessment: WDL    Cardiac Monitoring: None  Code Status: No CPR- Do NOT Intubate      Clinically Significant Risk Factors                  # Hypertension: Noted on problem list     # Dementia: noted on problem list              Disposition Plan      Expected Discharge Date: 06/15/2023                The patient's care was discussed with the Attending Physician, Dr. Driscoll.    Ronald Hernández  Medical Student  Medicine Service, [facility] TEAM 1  [facility]  Securely message with LAFASO (more info)  Text page via [facility] Paging/Directory   See signed in provider for up to date coverage information     ______________________________________________________________________    Interval History   Patient had some left arm finger/arm pain yesterday evening that resolved with tylenol.  Otherwise no overnight events.  Patient reports an improving appetite today, and has no nausea, vomiting, abdominal pain/discomfort, shortness of breath, fevers, chills, or pain.    Physical Exam   Vital Signs: Temp: 98.1  F (36.7  C) Temp src: Oral BP: 137/78 Pulse: 77   Resp: 16 SpO2: 100 % O2 Device: None (Room air)    Weight: 131 lbs 0 oz  General: Awake, alert, no acute distress  HEENT: NCAT  Cardiac: normal rate and regular rhythm, no murmurs  Respiratory: clear to ascultation bilaterally, no increased work of breathing  GI: Soft, non-tender, non-distended, BS+  Neuro: Alert and oriented to self and place but not time or situation.  Moving all extremities spontaneously, able to speak in short/simple phrases with baseline dysarthria   MSK: No lower extremity edema    Data     I have personally reviewed the following data over the past 24 hrs:    4.7  \   10.6 (L)   / 153     138 107 17.5 /  299 (H)   4.1 20 (L) 1.38 (H) \       Imaging results reviewed over the past 24 hrs:   No new imaging results.

## 2023-06-14 NOTE — PLAN OF CARE
Speech Language Therapy Discharge Summary    Reason for therapy discharge:    All goals and outcomes met, no further needs identified.    Progress towards therapy goal(s). See goals on Care Plan in [facility] electronic health record for goal details.  Goals met    Therapy recommendation(s):    No further therapy is recommended.    Recommend pt continue pureed diet (4) and thin liquids (0) with 1:1 supervision. Caregivers to assist pt with upright positioning for all PO, cue/verify each swallow response, and encourage PO intake as able. Suspect pt's oral phase impairements are likely at baseline due to hx of vascular dementia. Pt will require ongoing supervision and cues to swallow. Attempted to discuss with daughter, however she did not answer. Will complete ST orders.

## 2023-06-14 NOTE — PLAN OF CARE
Neuro: A&Ox3. Disoriented to time. Forgetful.   Cardiac: Afebrile VSS.   Respiratory: Sating >95 on RA.  GI/: Adequate urine output. No BM this shift   Diet/appetite: Tolerating reg diet with carb coverage. Eating well.  Activity:  Assist of 1 up to chair   Pain: At acceptable level on current regimen. Denies pain.   Skin: No new deficits noted.  LDA's:    Plan: Continue with POC. Notify primary team with changes.   Goal Outcome Evaluation:      Plan of Care Reviewed With: patient    Overall Patient Progress: improvingOverall Patient Progress: improving

## 2023-06-14 NOTE — PLAN OF CARE
Goal Outcome Evaluation:    BP (!) 163/74 (BP Location: Left arm)   Pulse 80   Temp 97.9  F (36.6  C) (Oral)   Resp 16   Wt 59.4 kg (131 lb)   SpO2 97%   BMI 21.80 kg/m       1229-0037    Patient VSS on RA, alert to self, situation, and place, disoriented to time, assist of 1/2 with gait belt and walker, q4 blood sugars, carb count, BS-167, 299, q4 neuros, purewick in place with good output, incontinent of urine and stool, no BM this shift    .Melissa Douglas RN on 6/14/2023 at 12:47 PM

## 2023-06-14 NOTE — CONSULTS
Care Management Initial Consult    Please see note from 6/13/23    SW to follow and assist with any other discharge needs that may arise.  MONICA Frederick   7D    Phone: [phone]  Pager: [phone]

## 2023-06-14 NOTE — PLAN OF CARE
Goal Outcome Evaluation:  3890-5269  Afebrile, BP max 158/77. Alert and oriented x 4, denies nausea/sob. Neuro's Q 4 hrs, neuro's are intact. C/o pain at left arm, given PRN tylenol x 1 with relief.  BG @ 1700 is 153, sliding scale given, carb covered, BG @ 2200 is 322, sliding scale given,  Pt takes pills whole with water, do not crush it as pt did not like it.Has purwick on, voiding adequately.on continuous dextrose 5% and 0.45% NaCl infusion infusing @ 70 ml/hr.  Continue to monitor care.

## 2023-06-14 NOTE — PROGRESS NOTES
Diabetes Consult Daily  Progress Note          Assessment/Plan:   Isabell Matthews is a 62 year old female who has medical history T1DM, DKA, seizures, CVAs with baseline aphasia admitted for hyperglycemia and DKA, now with normal anion gap and WNL ketones.      1. DKA - resolved  #T1DM    - Decrease Lantus to 18 units q24h.  - Increase aspart to 1/12.5 g CHO  - aspart MDSSI correction Q4h   - BG checks as per the insulin drip protocol. Once the insulin drip is stopped then recommend to check Q4H.  - hypoglycemia protocol   - can stop dextrose infusion from diabetes point       Likely plan for discharge in AM  - Tentative discharge plan, please reassess in AM    - lantus 18 units daily  - Aspart 4 units with meals  - Aspart medium correction scale    -on discharge, will recommend outpatient follow up with [facility] Endocr[facility]ology service             Interval History:     The last 24 hours progress and nursing notes reviewed.  Patient started on pureed diet yesterday  She is currently on D5-1/2 NS running at 75 ml/hr. BG spike around dinner time last evening    Recent Labs   Lab 06/14/23  0803 06/14/23  0518 06/14/23  0504 06/14/23  0106 06/13/23  2139 06/13/23  1644   * 136* 136* 247* 322* 153*           Nutrition:     Orders Placed This Encounter      Combination Diet Regular Diet; Pureed Diet (level 4); Thin Liquids (level 0)    Supplements: None        PTA Regimen:   As per last note by her primary care, she was taking lantus 18 units at home and aspart 4 units with meal + sliding scale            Review of Systems:   See interval hx          Medications:   no steroid         Physical Exam:   General: resting comfortably on bed  RS: breathing normally  BP (!) 163/74 (BP Location: Left arm)   Pulse 80   Temp 97.9  F (36.6  C) (Oral)   Resp 16   Wt 59.4 kg (131 lb)   SpO2 97%   BMI 21.80 kg/m           Data:     Lab Results   Component Value Date    A1C 10.8 05/20/2023    A1C 11.2  03/29/2023    A1C 10.3 10/31/2022    A1C 8.9 05/03/2022    A1C 8.8 04/05/2022    A1C 7.8 06/21/2021    A1C 11.7 09/27/2020            Lab Results   Component Value Date    CREAT 1.7 (H) 06/21/2021         CBC RESULTS:   Recent Labs   Lab Test 06/12/23  0541   WBC 9.1   RBC 3.47*   HGB 10.4*   HCT 31.7*   MCV 91   MCH 30.0   MCHC 32.8   RDW 14.5        Recent Labs   Lab Test 06/12/23  1400 06/12/23  1305 06/12/23  0208 06/12/23  0155 06/11/23  2200 06/11/23  2158   NA  --   --   --  144  --  143   POTASSIUM  --   --   --  3.6  --  3.6   CHLORIDE  --   --   --  115*  --  114*   CO2  --   --   --  19*  --  19*   ANIONGAP  --   --   --  10  --  10   * 187*   < > 116*   < > 121*   BUN  --   --   --  45.4*  --  48.2*   CR  --   --   --  1.50*  --  1.49*   VERONICA  --   --   --  8.4*  --  8.7*    < > = values in this interval not displayed.       Patient was discussed with attending Dr.Zekarias Zahra Leon MD  Endocrinology fellow      To contact Endocrine Diabetes service:   From 8AM-4PM: page inpatient diabetes provider that is following the patient that day (see filed or incomplete progress notes/consult notes).  If uncertain of provider assignment: page job code [id number].  For questions or updates from 4PM-8AM: page the diabetes job code for on call fellow: [phone]    Please notify inpatient diabetes service if changes are planned that will impact glycemia, such as changes to steroids, enteral feeding, parenteral feeding, dextrose fluids or procedures requiring prolonged NPO status.

## 2023-06-14 NOTE — PROVIDER NOTIFICATION
Time of notification: 5:29 AM  Provider notified:  Patient status:  Both lumen on Pt's PICC doesn't have blood return. It flushes, but doesn't draw blood back   Temp:  [98.3  F (36.8  C)-98.8  F (37.1  C)] 98.4  F (36.9  C)  Pulse:  [77-81] 79  Resp:  [16-18] 16  BP: (153-185)/(56-89) 158/78  SpO2:  [95 %-99 %] 97 %  Orders received:

## 2023-06-15 ENCOUNTER — NURSE TRIAGE (OUTPATIENT)
Dept: NURSING | Facility: CLINIC | Age: 63
End: 2023-06-15
Payer: COMMERCIAL

## 2023-06-15 VITALS
OXYGEN SATURATION: 100 % | HEART RATE: 84 BPM | WEIGHT: 130.07 LBS | BODY MASS INDEX: 21.64 KG/M2 | TEMPERATURE: 98 F | SYSTOLIC BLOOD PRESSURE: 136 MMHG | RESPIRATION RATE: 16 BRPM | DIASTOLIC BLOOD PRESSURE: 74 MMHG

## 2023-06-15 LAB
ANION GAP SERPL CALCULATED.3IONS-SCNC: 11 MMOL/L (ref 7–15)
BUN SERPL-MCNC: 14.3 MG/DL (ref 8–23)
CALCIUM SERPL-MCNC: 9 MG/DL (ref 8.8–10.2)
CHLORIDE SERPL-SCNC: 107 MMOL/L (ref 98–107)
CREAT SERPL-MCNC: 1.33 MG/DL (ref 0.51–0.95)
DEPRECATED HCO3 PLAS-SCNC: 22 MMOL/L (ref 22–29)
ERYTHROCYTE [DISTWIDTH] IN BLOOD BY AUTOMATED COUNT: 14.2 % (ref 10–15)
GFR SERPL CREATININE-BSD FRML MDRD: 44 ML/MIN/1.73M2
GLUCOSE BLDC GLUCOMTR-MCNC: 129 MG/DL (ref 70–99)
GLUCOSE BLDC GLUCOMTR-MCNC: 176 MG/DL (ref 70–99)
GLUCOSE BLDC GLUCOMTR-MCNC: 192 MG/DL (ref 70–99)
GLUCOSE BLDC GLUCOMTR-MCNC: 211 MG/DL (ref 70–99)
GLUCOSE BLDC GLUCOMTR-MCNC: 286 MG/DL (ref 70–99)
GLUCOSE BLDC GLUCOMTR-MCNC: 82 MG/DL (ref 70–99)
GLUCOSE SERPL-MCNC: 87 MG/DL (ref 70–99)
HCT VFR BLD AUTO: 34.7 % (ref 35–47)
HGB BLD-MCNC: 10.8 G/DL (ref 11.7–15.7)
MCH RBC QN AUTO: 29 PG (ref 26.5–33)
MCHC RBC AUTO-ENTMCNC: 31.1 G/DL (ref 31.5–36.5)
MCV RBC AUTO: 93 FL (ref 78–100)
PLATELET # BLD AUTO: 138 10E3/UL (ref 150–450)
POTASSIUM SERPL-SCNC: 3.7 MMOL/L (ref 3.4–5.3)
RBC # BLD AUTO: 3.72 10E6/UL (ref 3.8–5.2)
SODIUM SERPL-SCNC: 140 MMOL/L (ref 136–145)
WBC # BLD AUTO: 4.3 10E3/UL (ref 4–11)

## 2023-06-15 PROCEDURE — 80053 COMPREHEN METABOLIC PANEL: CPT | Performed by: INTERNAL MEDICINE

## 2023-06-15 PROCEDURE — 99285 EMERGENCY DEPT VISIT HI MDM: CPT | Mod: 25 | Performed by: EMERGENCY MEDICINE

## 2023-06-15 PROCEDURE — 82803 BLOOD GASES ANY COMBINATION: CPT | Performed by: EMERGENCY MEDICINE

## 2023-06-15 PROCEDURE — 99239 HOSP IP/OBS DSCHRG MGMT >30: CPT | Performed by: STUDENT IN AN ORGANIZED HEALTH CARE EDUCATION/TRAINING PROGRAM

## 2023-06-15 PROCEDURE — 250N000011 HC RX IP 250 OP 636

## 2023-06-15 PROCEDURE — 83605 ASSAY OF LACTIC ACID: CPT | Performed by: EMERGENCY MEDICINE

## 2023-06-15 PROCEDURE — 83735 ASSAY OF MAGNESIUM: CPT | Performed by: EMERGENCY MEDICINE

## 2023-06-15 PROCEDURE — 96374 THER/PROPH/DIAG INJ IV PUSH: CPT | Performed by: EMERGENCY MEDICINE

## 2023-06-15 PROCEDURE — 99233 SBSQ HOSP IP/OBS HIGH 50: CPT | Performed by: INTERNAL MEDICINE

## 2023-06-15 PROCEDURE — 85027 COMPLETE CBC AUTOMATED: CPT | Performed by: EMERGENCY MEDICINE

## 2023-06-15 PROCEDURE — 36415 COLL VENOUS BLD VENIPUNCTURE: CPT | Performed by: EMERGENCY MEDICINE

## 2023-06-15 PROCEDURE — 96361 HYDRATE IV INFUSION ADD-ON: CPT | Performed by: EMERGENCY MEDICINE

## 2023-06-15 PROCEDURE — 85027 COMPLETE CBC AUTOMATED: CPT | Performed by: INTERNAL MEDICINE

## 2023-06-15 PROCEDURE — 258N000003 HC RX IP 258 OP 636: Performed by: EMERGENCY MEDICINE

## 2023-06-15 PROCEDURE — 82962 GLUCOSE BLOOD TEST: CPT

## 2023-06-15 PROCEDURE — 250N000011 HC RX IP 250 OP 636: Performed by: EMERGENCY MEDICINE

## 2023-06-15 PROCEDURE — 83690 ASSAY OF LIPASE: CPT | Performed by: EMERGENCY MEDICINE

## 2023-06-15 PROCEDURE — 250N000013 HC RX MED GY IP 250 OP 250 PS 637: Performed by: STUDENT IN AN ORGANIZED HEALTH CARE EDUCATION/TRAINING PROGRAM

## 2023-06-15 PROCEDURE — 250N000013 HC RX MED GY IP 250 OP 250 PS 637

## 2023-06-15 PROCEDURE — 99284 EMERGENCY DEPT VISIT MOD MDM: CPT | Performed by: EMERGENCY MEDICINE

## 2023-06-15 PROCEDURE — 250N000009 HC RX 250: Performed by: INTERNAL MEDICINE

## 2023-06-15 PROCEDURE — 250N000013 HC RX MED GY IP 250 OP 250 PS 637: Performed by: INTERNAL MEDICINE

## 2023-06-15 PROCEDURE — 82010 KETONE BODYS QUAN: CPT | Performed by: EMERGENCY MEDICINE

## 2023-06-15 PROCEDURE — 999N000147 HC STATISTIC PT IP EVAL DEFER

## 2023-06-15 RX ORDER — PROCHLORPERAZINE MALEATE 10 MG
10 TABLET ORAL EVERY 6 HOURS PRN
Qty: 30 TABLET | Refills: 0 | Status: SHIPPED | OUTPATIENT
Start: 2023-06-15 | End: 2024-05-20

## 2023-06-15 RX ORDER — ONDANSETRON 2 MG/ML
4 INJECTION INTRAMUSCULAR; INTRAVENOUS ONCE
Status: COMPLETED | OUTPATIENT
Start: 2023-06-15 | End: 2023-06-16

## 2023-06-15 RX ORDER — ONDANSETRON 4 MG/1
4 TABLET, ORALLY DISINTEGRATING ORAL EVERY 8 HOURS PRN
Qty: 30 TABLET | Refills: 0 | Status: ON HOLD | OUTPATIENT
Start: 2023-06-15 | End: 2023-09-06

## 2023-06-15 RX ORDER — ONDANSETRON 2 MG/ML
4 INJECTION INTRAMUSCULAR; INTRAVENOUS ONCE
Status: COMPLETED | OUTPATIENT
Start: 2023-06-15 | End: 2023-06-15

## 2023-06-15 RX ORDER — OXYCODONE HYDROCHLORIDE 5 MG/1
5 TABLET ORAL ONCE
Status: COMPLETED | OUTPATIENT
Start: 2023-06-15 | End: 2023-06-15

## 2023-06-15 RX ORDER — SODIUM CHLORIDE 9 MG/ML
INJECTION, SOLUTION INTRAVENOUS CONTINUOUS
Status: DISCONTINUED | OUTPATIENT
Start: 2023-06-16 | End: 2023-06-18

## 2023-06-15 RX ADMIN — PROCHLORPERAZINE EDISYLATE 5 MG: 5 INJECTION INTRAMUSCULAR; INTRAVENOUS at 11:39

## 2023-06-15 RX ADMIN — SODIUM CHLORIDE 1000 ML: 9 INJECTION, SOLUTION INTRAVENOUS at 23:59

## 2023-06-15 RX ADMIN — LEVETIRACETAM 500 MG: 100 SOLUTION ORAL at 08:18

## 2023-06-15 RX ADMIN — LEVOTHYROXINE SODIUM 75 MCG: 100 SOLUTION ORAL at 08:18

## 2023-06-15 RX ADMIN — Medication 12.5 MG: at 10:15

## 2023-06-15 RX ADMIN — OXYCODONE HYDROCHLORIDE 5 MG: 5 TABLET ORAL at 03:14

## 2023-06-15 RX ADMIN — ONDANSETRON 4 MG: 2 INJECTION INTRAMUSCULAR; INTRAVENOUS at 13:16

## 2023-06-15 RX ADMIN — ASPIRIN 81 MG CHEWABLE TABLET 81 MG: 81 TABLET CHEWABLE at 08:21

## 2023-06-15 RX ADMIN — CLOPIDOGREL BISULFATE 75 MG: 75 TABLET, FILM COATED ORAL at 10:15

## 2023-06-15 RX ADMIN — ONDANSETRON 4 MG: 2 INJECTION INTRAMUSCULAR; INTRAVENOUS at 23:58

## 2023-06-15 ASSESSMENT — ACTIVITIES OF DAILY LIVING (ADL)
ADLS_ACUITY_SCORE: 41

## 2023-06-15 NOTE — PLAN OF CARE
Discharge  D: Orders for discharge and outpatient medications written.  I: Home medications and return to clinic schedule reviewed with patient. Discharge instructions and parameters for calling Health Care Provider reviewed. Patient left at around 1620 accompanied by NST.   A: Patient/family verbalized understanding and was ready for discharge.   P: Patient instructed to  medications in Pharmacy. Follow up as scheduled per order.      Pt is alert and oriented x 4, just used bathroom before leaving. PICC is taken out, pt left around 1620 with NST and will stop at pharmacy to  prescription and her daughter will be waiting at front door. NST called daughter to come to pick the patient.

## 2023-06-15 NOTE — PROVIDER NOTIFICATION
"Pgd. Dr. Palomares at 1443    \"Daughter of Pt would like PO compazine & zofran prescriptions for discharge due to emesis x2 today. Do we have an estimated discharge time? Thanks\"    Odalys #[id number]    Outcome: Compazine and Zofran ordered for discharge pharmacy. Discharge orders placed.     Pgd. Dr. Palomares at 5245    \"Need Pt care order to remove PICC. Please place. Thanks\"    Odalys #[id number]      Outcome: Orders placed      "

## 2023-06-15 NOTE — PLAN OF CARE
Physical Therapy: Orders received. Chart reviewed and discussed with care team.? Physical Therapy not indicated due to per discussion with pt and pt's daughter; they report they are planning on going home soon and have all mobility needs sorted.? Defer discharge recommendations to care team.? Will complete orders.

## 2023-06-15 NOTE — PLAN OF CARE
Goal Outcome Evaluation:  [id number]  Afebrile, hypertensive, on coreg. Alert and oriented x 4 but forgetful. Neuro's q 4 hrs, neuro's are intact.  C/o pain at left arm/hand, given PRN Tylenol 1 with relief. Pt is nauseous, given PRN iv compazine x 1. Ate 50 % food. , no sliding scale needed, carb covered, 136 no sliding scale needed. Up with 1 assist with gait belt, have purwick is on bed time and voiding adequately. Pt takes pills whole with water, do not crush pills. is consult.  Pt discharging home tomorrow afternoon.  Continue to monitor care.

## 2023-06-15 NOTE — PLAN OF CARE
[id number]    Hypertensive, within parameters. OVSS, afebrile and on RA. Disoriented to time. Denies and SOB, endorses DUMONT. Has mild L shoulder pain, declined tylenol when offered, stated scheduled aspirin works best. Reported intermittent nausea throughout day and had emesis x2. Gave PRN IV compazine x1 with minimal effect, pgd and received one time order of IV zofran. Poor appetite throughout shift, eating only 25%-50% of meals. BS were 211 and 192, covered with sliding scale and carb coverage when appropriated. Up to bathroom with Ax1, walker and gait belt. LBM 6/13. External catheter in place due to hx of incontinence. Pt adequate for discharge, will be leaving this afternoon.

## 2023-06-15 NOTE — PROGRESS NOTES
Diabetes Consult Daily  Progress Note          Assessment/Plan:   Isabell Matthews is a 62 year old female who has medical history T1DM, DKA, seizures, CVAs with baseline aphasia admitted for hyperglycemia and DKA,    1. DKA - resolved  #T1DM  Overall fair control  - continue Lantus  18 units q24h.  - continue  aspart  1/12.5 g CHO  - aspart medium SSI AC and HS   - hypoglycemia protocol     -  discharge plan:    - lantus 18 units daily  - Aspart 4 units with meals  - Aspart medium correction scale    -on discharge, will recommend outpatient follow up with [facility] service    Will sign off, please call with any questions             Interval History:   Reports episode of vomiting in the morning  Daughter at bedside  On puréed diet  Daughter notes that she is a caregiver and titrates meal insulin dose based on blood glucose and carb intake    Recent Labs   Lab 06/15/23  1257 06/15/23  1138 06/15/23  0841 06/15/23  0530 06/15/23  0528 06/15/23  0136   * 286* 211* 87 82 129*           Nutrition:     Orders Placed This Encounter      Combination Diet Regular Diet      Diet    Supplements: None        PTA Regimen:   As per last note by her primary care, she was taking lantus 18 units at home and aspart 4 units with meal + sliding scale          Review of Systems:   See interval hx          Medications:   no steroid         Physical Exam:   General: resting comfortably on bed  RS: breathing normally  BP (!) 159/85 (BP Location: Left arm)   Pulse 77   Temp 97.3  F (36.3  C) (Axillary)   Resp 20   Wt 59 kg (130 lb 1.1 oz)   SpO2 99%   BMI 21.64 kg/m           Data:     Lab Results   Component Value Date    A1C 10.8 05/20/2023    A1C 11.2 03/29/2023    A1C 10.3 10/31/2022    A1C 8.9 05/03/2022    A1C 8.8 04/05/2022    A1C 7.8 06/21/2021    A1C 11.7 09/27/2020            Lab Results   Component Value Date    CREAT 1.7 (H) 06/21/2021       TYLER Mejias    To contact  Endocrine Diabetes service:   From 8AM-4PM: page inpatient diabetes provider that is following the patient that day (see filed or incomplete progress notes/consult notes).  If uncertain of provider assignment: page job code [id number].  For questions or updates from 4PM-8AM: page the diabetes job code for on call fellow: [phone]    Please notify inpatient diabetes service if changes are planned that will impact glycemia, such as changes to steroids, enteral feeding, parenteral feeding, dextrose fluids or procedures requiring prolonged NPO status.

## 2023-06-15 NOTE — PROVIDER NOTIFICATION
Web-paged provider (STEPHANIE Braswell):  Pt complaining of L shoulder pain 7/10, Tylenol is scheduled q 8 hrs. Can she get anything else for pain? Thanks! [phone]

## 2023-06-15 NOTE — PROGRESS NOTES
"Care Management Follow Up    Length of Stay (days): 4    Expected Discharge Date: 06/15/2023     Concerns to be Addressed:     Home resources post discharge     Patient plan of care discussed at interdisciplinary rounds: Yes    Anticipated Discharge Disposition:  Home with daughter     Anticipated Discharge Services:  TBD    Anticipated Discharge DME: Wheelchair, Other (see comment), Shower Chair (walker, transfer belt)    Patient/family educated on Medicare website which has current facility and service quality ratings:  Yes    Education Provided on the Discharge Plan:  Yes    Patient/Family in Agreement with the Plan:  Yes    Referrals Placed by CM/SW: Homecare  Private pay costs discussed: Not applicable    Additional Information:    CHW spoke with armaan Rodgers's daughter, about what needs they were interested in for her mother, as there was a SW consult ordered the night before (6/14/23 evening) for \"extra help at home\".  CHW asked Vishal if there was anything they were looking for specifically in the kind of support her mother needs. Vishal has shared that she would like someone who is more skilled with working with medically complex people, as her last PCA was not as knowledgeable in diabetic cares and past facilities that her mother has been at has mismanaged her mother's diabetic care, so she would prefer if someone like a health aide or home care skilled nurse would be able to come help support her and mother. CHW shared that the RNCC who is working today, [employer], can try to look to see if her mother qualifies and would let her know.    Vishal also mentioned that she would like more food support and has asked for more information about a program/organization she has heard about, [employer], an organization that helps provide fresh, frozen meals for disabled people dealing with food insecurity and wants to know if she qualifies. CHW has not heard about this program before but offered to research more " about it for the pt and her daughter. CHW mentioned that she could possibly call [facility], who helps seniors and anyone with disabilities look for home services and support. Vishal said that they live in [facility] and so that would not work. CHW relayed that they would also look more into programs at [facility] that services a similar program like [facility].     CHW was not able to find anything entirely similar to [facility] at [facility], even after asking fellow Care Management team if they knew anything about it. None knew of anything similar but shared the [employer] Assessment number at [facility] ([phone]) that the daughter can call. CHW called the number and they shared that they have a current wait list of 5 months for people under the age of 65 and 1 month for 65 and up. CHW shared that pt is 63 in August and if the pt can be seen by next month. The  at the [facility] shared that by the time they can assess the pt, it will most likely be in August, when they turn 63.    CHW also called [employer] to gain more information about the organization on behalf of pt. They are based out of [place], but deliver out from [place] to [place]. They provide long-term food delivery for pt's with MA and short-term delivery for pts being discharge post a hospital stay, dependent on insurance. Vishal has mentioned that her mother may qualify since she has both [employer] Medicare Advantage and PMAP. The  sent a request form to [facility] for the pt and will call the family within the next 1-3 business days to follow-up on service acceptance/denial.     CHW printed out a sheet that had all the information typed up with the daughter. CHW gave the sheet to the daughter and explained every thing they learned to try and find the resources she requested for her mother. Pt understood and shared frustration with being serviced under [facility], but shard that she will try to be  more proactive in continuing to follow-up with the county to receive that CAD[place] waiver for her mother    [email or web address]  [phone]

## 2023-06-15 NOTE — PLAN OF CARE
BP (!) 149/69 (BP Location: Left arm)   Pulse 74   Temp 97.6  F (36.4  C) (Oral)   Resp 20   Wt 59.4 kg (131 lb)   SpO2 98%   BMI 21.80 kg/m      Hx of HTN, BP max 167/84, below paging parameters. OVSS on RA. Pt reports L shoulder pain, oxycodone given x1 with relief, she declined Tylenol this AM. She denies N/V, diarrhea, SOB. Neuro intact, A&Ox3. Bed alarm on for safety, pt forgetful with call light. Purewick in overnight, adequate UOP, no BM. , 82, no sliding scale insulin needed. Pt ate oatmeal this morning and is excited for breakfast. Plan for possible discharge today.    Problem: Plan of Care - These are the overarching goals to be used throughout the patient stay.    Goal: Plan of Care Review  Description: The Plan of Care Review/Shift note should be completed every shift.  The Outcome Evaluation is a brief statement about your assessment that the patient is improving, declining, or no change.  This information will be displayed automatically on your shift note.  Outcome: Progressing  Goal: Optimal Comfort and Wellbeing  Outcome: Progressing  Intervention: Monitor Pain and Promote Comfort  Recent Flowsheet Documentation  Taken 6/15/2023 0500 by Liane Gonzales RN  Pain Management Interventions: medication offered but refused  Taken 6/15/2023 0314 by Liane Gonzales RN  Pain Management Interventions: medication (see MAR)  Taken 6/15/2023 0100 by Liane Gonzales RN  Pain Management Interventions: Provider notified (comment)     Problem: Oral Intake Inadequate  Goal: Improved Oral Intake  Outcome: Progressing     Problem: Pain Acute  Goal: Optimal Pain Control and Function  Outcome: Progressing  Intervention: Develop Pain Management Plan  Recent Flowsheet Documentation  Taken 6/15/2023 0500 by Liane Gonzales RN  Pain Management Interventions: medication offered but refused  Taken 6/15/2023 0314 by Liane Gonzales RN  Pain Management Interventions: medication (see MAR)  Taken 6/15/2023 0100 by  Liane Gonzales RN  Pain Management Interventions: Provider notified (comment)  Intervention: Prevent or Manage Pain  Recent Flowsheet Documentation  Taken 6/15/2023 0000 by Liane Gonzales RN  Medication Review/Management: medications reviewed     Problem: Fall Injury Risk  Goal: Absence of Fall and Fall-Related Injury  Outcome: Progressing  Intervention: Identify and Manage Contributors  Recent Flowsheet Documentation  Taken 6/15/2023 0000 by Liane Gonzales RN  Medication Review/Management: medications reviewed  Intervention: Promote Injury-Free Environment  Recent Flowsheet Documentation  Taken 6/15/2023 0000 by Liane Gonzales RN  Safety Promotion/Fall Prevention:   activity supervised   assistive device/personal items within reach   clutter free environment maintained   nonskid shoes/slippers when out of bed   patient and family education   room organization consistent   safety round/check completed   room near nurse's station     Problem: Coping Ineffective (Oncology Care)  Goal: Effective Coping  Outcome: Progressing     Problem: Fatigue (Oncology Care)  Goal: Improved Activity Tolerance  Outcome: Progressing  Intervention: Promote Improved Energy  Recent Flowsheet Documentation  Taken 6/15/2023 0000 by Liane Gonzales RN  Activity Management: activity adjusted per tolerance     Problem: Oral Intake Altered (Oncology Care)  Goal: Optimal Oral Intake  Outcome: Progressing     Problem: Pain Acute (Oncology Care)  Goal: Optimal Pain Control  Outcome: Progressing  Intervention: Develop Pain Management Plan  Recent Flowsheet Documentation  Taken 6/15/2023 0500 by Liane Gonzales RN  Pain Management Interventions: medication offered but refused  Taken 6/15/2023 0314 by Liane Gonzales RN  Pain Management Interventions: medication (see MAR)  Taken 6/15/2023 0100 by Liane Gonzales RN  Pain Management Interventions: Provider notified (comment)  Intervention: Prevent or Manage Pain  Recent Flowsheet  Documentation  Taken 6/15/2023 0000 by Liane Gonzales RN  Medication Review/Management: medications reviewed

## 2023-06-15 NOTE — PROVIDER NOTIFICATION
"    4/14/2022        RE: Cm Lundberg  [address]  [place] [address]          [facility] SERVICES    [place] Medical Record Number:  [id number]  Place of Service where encounter took place: [facility] ([facility]) [[id number]]   CODE STATUS:   CPR/Full code     Chief Complaint/Reason for Visit:  Chief Complaint   Patient presents with     RECHECK     TCU 4/14/2022. S/p incision and drainage, right foot with application of theraskin, 4/4/2022.       TCU HPI:    Cm Lundberg is a 51 year old male with hx of CAD, s/p stents and CABG, HTN, DM, prior CVA, afib/flutter on xarelto, admitted to the hospital on 12/17/2021 for elective procedure for right foot ulceration. He underwent incision and drainage of the right foot, excisional debridement of the ulceration of the right foot into the level of the muscle, under MAC with popliteal block anesthesia on 12/17/2021. Wound vac was subsequently placed. He had no hospital complications. Seen by ID with recommendation for 5 days Augmentin at hospital discharge based on culture results. Chronic anticoagulation was restarted. He was felt to need TCU and discharged to TCU on 12/22/0221. He has had close follow up with podiatry, remained NWB, treated with wound vac. He underwent procedure on 1/27/2022 with incision and drainage with application of theraskin. Continued NWB with wound vac, frequent follow up with podiatry. At visit 3/25/2022 recommendation was made for another procedure, incision and drainage with application of theraskin on Mon 4/4/2022. Notes indicated per Dr. Kim: \"TREATMENT:-The lateral right foot ulceration continues to have exposed tendon of the peroneal brevis. Based on this presentation, I recommend we again try application of theraskin with continued use of the wound vac.\". Will have wound vac applied after procedure. Dr. Kim said okay to continue on xarelto anticoagulation pre op.       Today:  He " "Pgd Dr. Kelly at 0727    \"Could we discontinue hourly BS checks as pt is no longer on Insulin drip? Thanks\"    Odalys #[id number]      Pgd. MD Inter at 1235    \"Could we get PRN zofran, gave IV compazine and pt had emesis. Thanks\"    Odalsy #[id number]      Pgd MD Intern at 1250    \"Daughter is at bedside wanting to talk to team. Wants diet order changed to reg diet. Thanks\"    Odalys #[id number]  " underwent procedure 4/4/2022 with no apparent complications. He continues with wound vac, NWB. Had follow up with podiatry 4/12/2022, continue NWB, next appt 4/27/2022. Has some mild swelling in LE. No significant pain. Compliant with cares and NWB status. Diabetes managed with metformin and glipizide. Hx CAD and stents, HTN, afib on xarelto. He has chronic stable back pain.  Appetite is good. No abdominal pain. No urinary sx. He denies fever, cough, congestion, fever.       PAST MEDICAL HISTORY:  Past Medical History:   Diagnosis Date     Anxiety      Arthritis      Cerebral infarction (H)      Congestive heart failure (H)      Coronary artery disease     CABG with multiple stents     Depression      Diabetes (H)      Equinovarus deformity, acquired, right      GERD (gastroesophageal reflux disease)      Heart attack (H)      History of blood transfusion      Hyperlipidemia      Hypertension      Migraine      Migraines      Myalgia      PONV (postoperative nausea and vomiting)     mild nausea     Stented coronary artery        MEDICATIONS:  Current Outpatient Medications   Medication Sig Dispense Refill     acetaminophen (TYLENOL) 325 MG tablet Take 650 mg by mouth every 4 hours as needed for mild pain        ARIPiprazole (ABILIFY) 2 MG tablet 2 mg daily        ASPERCREME ORIGINAL 10 % external cream APPLY TO LOW BACK TWICE DAILY FOR LOW BACK PAIN       atorvastatin (LIPITOR) 80 MG tablet Take 80 mg by mouth daily        cholestyramine (QUESTRAN) 4 g packet Take 1 packet by mouth every other day        DULoxetine (CYMBALTA) 60 MG capsule Take 120 mg by mouth daily takle 120mg       erenumab-aooe (AIMOVIG) 140 MG/ML injection Inject 1 mL (140 mg) Subcutaneous every 30 days 1 mL 0     fenofibrate micronized (LOFIBRA) 200 MG capsule Take 200 mg by mouth every morning (before breakfast)        ferrous sulfate (FEROSUL) 325 (65 Fe) MG tablet Take 325 mg by mouth daily (with breakfast)        gabapentin (NEURONTIN) 300  "MG capsule Take 600 mg by mouth At Bedtime       glipiZIDE (GLUCOTROL) 5 MG tablet TAKE 1/2 TABLET (2.5MG) BY MOUTH DAILY DX DIABETES       loperamide (IMODIUM) 2 MG capsule Take 4 mg by mouth as needed for diarrhea        losartan (COZAAR) 25 MG tablet Take 25 mg by mouth At Bedtime        magnesium oxide 400 MG CAPS Take 400 mg by mouth 4 times daily       Menthol, Topical Analgesic, (BIOFREEZE ROLL-ON) 4 % GEL Apply 1 Application topically 2 times daily Apply to lower back for pain       metFORMIN (GLUCOPHAGE) 500 MG tablet TAKE 2 TABS (1000MG) BY MOUTH EVERY MORNING;TAKE 1 TAB BY MOUTH EVERY EVENING       metoprolol succinate ER (TOPROL-XL) 25 MG 24 hr tablet Take 75 mg by mouth daily        mirtazapine (REMERON) 7.5 MG tablet Take 7.5 mg by mouth At Bedtime        Neomycin-Bacitracin-Polymyxin (TRIPLE ANTIBIOTIC) 3.5-400-5000 OINT ointment Externally apply 1 applicator topically 3 times daily as needed       Nitroglycerin (NITROSTAT SL) Place 0.4 mg under the tongue every 5 minutes as needed for chest pain        omeprazole (PRILOSEC) 20 MG DR capsule Take 20 mg by mouth daily        oxyCODONE (ROXICODONE) 5 MG tablet TAKE 1 TAB BY MOUTH EVERY 6HOURS AS NEEDED FOR PAIN 28 tablet 0     polyethylene glycol (MIRALAX) 17 GM/Dose powder Take 1 packet by mouth daily as needed for constipation        senna-docusate (SENOKOT-S/PERICOLACE) 8.6-50 MG tablet Take 1 tablet by mouth 2 times daily as needed for constipation        traZODone (DESYREL) 50 MG tablet Take 50 mg by mouth At Bedtime        vitamin C (ASCORBIC ACID) 500 MG tablet Take 500 mg by mouth daily        XARELTO ANTICOAGULANT 20 MG TABS tablet TAKE 1 TAB BY MOUTH ONCE DAILY         PHYSICAL EXAM:  General: Patient is alert male, no distress.   Vitals: /73   Pulse 71   Temp 98.9  F (37.2  C)   Resp 18   Ht 1.702 m (5' 7\")   Wt 68.3 kg (150 lb 9.6 oz)   SpO2 97%   BMI 23.59 kg/m    HEENT: Head is NCAT. Eyes show no injection or icterus. Nares " negative. Oropharynx well hydrated.  Neck: No JVD.  Lungs: Non labored respirations.  : Deferred.  Extremities: Mild RLE swelling, puffy right foot. Calf nontender.   Musculoskeletal: Wound vac right foot.   Psych: Mood appears good.      LABS/DIAGNOSTIC DATA:  Component      Latest Ref Rng & Units 12/21/2021   Hemoglobin A1C      <=5.6 % 5.1     Component      Latest Ref Rng & Units 12/28/2021 4/1/2022   Sodium      136 - 145 mmol/L 140 139   Potassium      3.5 - 5.0 mmol/L 4.5 4.6   Chloride      98 - 107 mmol/L 107 104   Carbon Dioxide      22 - 31 mmol/L 25 26   Anion Gap      5 - 18 mmol/L 8 9   Glucose      70 - 125 mg/dL 141 (H) 129 (H)   Calcium      8.5 - 10.5 mg/dL 10.3 9.9   Urea Nitrogen      8 - 22 mg/dL 17 17   Creatinine      0.70 - 1.30 mg/dL 0.99 0.97   GFR Estimate      >60 mL/min/1.73m2 >90 >90     Component      Latest Ref Rng & Units 12/28/2021 4/1/2022   WBC      4.0 - 11.0 10e3/uL 5.4 6.3   RBC Count      4.40 - 5.90 10e6/uL 4.01 (L) 4.07 (L)   Hemoglobin      13.3 - 17.7 g/dL 11.1 (L) 10.5 (L)   Hematocrit      40.0 - 53.0 % 36.9 (L) 34.0 (L)   MCV      78 - 100 fL 92 84   MCH      26.5 - 33.0 pg 27.7 25.8 (L)   MCHC      31.5 - 36.5 g/dL 30.1 (L) 30.9 (L)   RDW      10.0 - 15.0 % 13.4 13.2   Platelet Count      150 - 450 10e3/uL 246 238         ASSESSMENT/PLAN:  1. Right foot ulceration. S/p procedures on 12/17/2021, 1/27/2022 and 4/4/2022 incision and drainage with application of theraskin per Dr. Kim. Wound vac, NWB, had follow up on 4/12/2022, NNO, next appt on 4/27/2022. Compliant with cares and restrictions.   2. HTN. Continue metoprolol and losartan. BPs are acceptable.  3. Afib/flutter. Anticoagulated with xarelto.  4. DM. On metformin and glipizide. Accuchecks followed in TCU.    5. Stable medical conditions: CAD. Hx of MIs. Hx of stenting and prior CABG. On statin, xarelto, beta blocker; Hx of stroke. Continue statin, xarelto; Anemia, on iron; Mood disorder, mixed depression  and anxiety on duloxetine, Abilify; Migraines. Receives monthly Aimovig.         Electronically signed by: Heidi Xie MD           Sincerely,        Heidi Xie MD

## 2023-06-16 ENCOUNTER — APPOINTMENT (OUTPATIENT)
Dept: SPEECH THERAPY | Facility: CLINIC | Age: 63
End: 2023-06-16
Attending: PHYSICIAN ASSISTANT
Payer: COMMERCIAL

## 2023-06-16 ENCOUNTER — APPOINTMENT (OUTPATIENT)
Dept: CT IMAGING | Facility: CLINIC | Age: 63
End: 2023-06-16
Attending: EMERGENCY MEDICINE
Payer: COMMERCIAL

## 2023-06-16 ENCOUNTER — HOSPITAL ENCOUNTER (OUTPATIENT)
Facility: CLINIC | Age: 63
Setting detail: OBSERVATION
Discharge: HOME | End: 2023-06-18
Attending: EMERGENCY MEDICINE | Admitting: PHYSICIAN ASSISTANT
Payer: COMMERCIAL

## 2023-06-16 DIAGNOSIS — R11.2 NAUSEA WITH VOMITING: ICD-10-CM

## 2023-06-16 DIAGNOSIS — R41.89 COGNITIVE DECLINE: ICD-10-CM

## 2023-06-16 DIAGNOSIS — G40.911 INTRACTABLE EPILEPSY WITH STATUS EPILEPTICUS, UNSPECIFIED EPILEPSY TYPE (H): Primary | ICD-10-CM

## 2023-06-16 DIAGNOSIS — Z11.52 ENCOUNTER FOR SCREENING LABORATORY TESTING FOR COVID-19 VIRUS: ICD-10-CM

## 2023-06-16 DIAGNOSIS — R11.2 NAUSEA AND VOMITING, UNSPECIFIED VOMITING TYPE: ICD-10-CM

## 2023-06-16 DIAGNOSIS — F09 MENTAL DISORDER DUE TO GENERAL MEDICAL CONDITION: ICD-10-CM

## 2023-06-16 DIAGNOSIS — I10 ESSENTIAL HYPERTENSION: ICD-10-CM

## 2023-06-16 DIAGNOSIS — Z87.440 HX: UTI (URINARY TRACT INFECTION): ICD-10-CM

## 2023-06-16 LAB
ALBUMIN SERPL BCG-MCNC: 3.3 G/DL (ref 3.5–5.2)
ALBUMIN SERPL BCG-MCNC: 3.7 G/DL (ref 3.5–5.2)
ALBUMIN UR-MCNC: NEGATIVE MG/DL
ALP SERPL-CCNC: 129 U/L (ref 35–104)
ALP SERPL-CCNC: 147 U/L (ref 35–104)
ALT SERPL W P-5'-P-CCNC: 31 U/L (ref 0–50)
ALT SERPL W P-5'-P-CCNC: 35 U/L (ref 0–50)
ANION GAP SERPL CALCULATED.3IONS-SCNC: 11 MMOL/L (ref 7–15)
ANION GAP SERPL CALCULATED.3IONS-SCNC: 13 MMOL/L (ref 7–15)
APPEARANCE UR: ABNORMAL
AST SERPL W P-5'-P-CCNC: 29 U/L (ref 0–45)
AST SERPL W P-5'-P-CCNC: 37 U/L (ref 0–45)
B-OH-BUTYR SERPL-SCNC: 0.3 MMOL/L
BACTERIA #/AREA URNS HPF: ABNORMAL /HPF
BACTERIA BLD CULT: NO GROWTH
BASE EXCESS BLDV CALC-SCNC: 0.5 MMOL/L (ref -7.7–1.9)
BASOPHILS # BLD AUTO: 0 10E3/UL (ref 0–0.2)
BASOPHILS NFR BLD AUTO: 0 %
BILIRUB SERPL-MCNC: 0.2 MG/DL
BILIRUB SERPL-MCNC: 0.3 MG/DL
BILIRUB UR QL STRIP: NEGATIVE
BUN SERPL-MCNC: 16.4 MG/DL (ref 8–23)
BUN SERPL-MCNC: 17.3 MG/DL (ref 8–23)
CALCIUM SERPL-MCNC: 8.3 MG/DL (ref 8.8–10.2)
CALCIUM SERPL-MCNC: 9.1 MG/DL (ref 8.8–10.2)
CHLORIDE SERPL-SCNC: 102 MMOL/L (ref 98–107)
CHLORIDE SERPL-SCNC: 109 MMOL/L (ref 98–107)
COLOR UR AUTO: ABNORMAL
CREAT SERPL-MCNC: 1.42 MG/DL (ref 0.51–0.95)
CREAT SERPL-MCNC: 1.44 MG/DL (ref 0.51–0.95)
DEPRECATED HCO3 PLAS-SCNC: 20 MMOL/L (ref 22–29)
DEPRECATED HCO3 PLAS-SCNC: 21 MMOL/L (ref 22–29)
EOSINOPHIL # BLD AUTO: 0.1 10E3/UL (ref 0–0.7)
EOSINOPHIL NFR BLD AUTO: 2 %
ERYTHROCYTE [DISTWIDTH] IN BLOOD BY AUTOMATED COUNT: 14.6 % (ref 10–15)
ERYTHROCYTE [DISTWIDTH] IN BLOOD BY AUTOMATED COUNT: 14.7 % (ref 10–15)
FLUAV RNA SPEC QL NAA+PROBE: NEGATIVE
FLUBV RNA RESP QL NAA+PROBE: NEGATIVE
GFR SERPL CREATININE-BSD FRML MDRD: 40 ML/MIN/1.73M2
GFR SERPL CREATININE-BSD FRML MDRD: 41 ML/MIN/1.73M2
GLUCOSE BLDC GLUCOMTR-MCNC: 102 MG/DL (ref 70–99)
GLUCOSE BLDC GLUCOMTR-MCNC: 151 MG/DL (ref 70–99)
GLUCOSE BLDC GLUCOMTR-MCNC: 172 MG/DL (ref 70–99)
GLUCOSE BLDC GLUCOMTR-MCNC: 173 MG/DL (ref 70–99)
GLUCOSE BLDC GLUCOMTR-MCNC: 214 MG/DL (ref 70–99)
GLUCOSE BLDC GLUCOMTR-MCNC: 61 MG/DL (ref 70–99)
GLUCOSE SERPL-MCNC: 236 MG/DL (ref 70–99)
GLUCOSE SERPL-MCNC: 241 MG/DL (ref 70–99)
GLUCOSE UR STRIP-MCNC: NEGATIVE MG/DL
HCO3 BLDV-SCNC: 27 MMOL/L (ref 21–28)
HCT VFR BLD AUTO: 31.4 % (ref 35–47)
HCT VFR BLD AUTO: 39.2 % (ref 35–47)
HGB BLD-MCNC: 10 G/DL (ref 11.7–15.7)
HGB BLD-MCNC: 12.4 G/DL (ref 11.7–15.7)
HGB UR QL STRIP: NEGATIVE
HOLD SPECIMEN: NORMAL
HOLD SPECIMEN: NORMAL
IMM GRANULOCYTES # BLD: 0 10E3/UL
IMM GRANULOCYTES NFR BLD: 0 %
KETONES UR STRIP-MCNC: NEGATIVE MG/DL
LACTATE SERPL-SCNC: 1.6 MMOL/L (ref 0.7–2)
LEUKOCYTE ESTERASE UR QL STRIP: ABNORMAL
LEVETIRACETAM SERPL-MCNC: 7.8 ΜG/ML (ref 10–40)
LIPASE SERPL-CCNC: 27 U/L (ref 13–60)
LYMPHOCYTES # BLD AUTO: 1.7 10E3/UL (ref 0.8–5.3)
LYMPHOCYTES NFR BLD AUTO: 34 %
MAGNESIUM SERPL-MCNC: 2.5 MG/DL (ref 1.7–2.3)
MCH RBC QN AUTO: 29.6 PG (ref 26.5–33)
MCH RBC QN AUTO: 29.7 PG (ref 26.5–33)
MCHC RBC AUTO-ENTMCNC: 31.6 G/DL (ref 31.5–36.5)
MCHC RBC AUTO-ENTMCNC: 31.8 G/DL (ref 31.5–36.5)
MCV RBC AUTO: 93 FL (ref 78–100)
MCV RBC AUTO: 94 FL (ref 78–100)
MONOCYTES # BLD AUTO: 0.3 10E3/UL (ref 0–1.3)
MONOCYTES NFR BLD AUTO: 5 %
MUCOUS THREADS #/AREA URNS LPF: PRESENT /LPF
NEUTROPHILS # BLD AUTO: 3 10E3/UL (ref 1.6–8.3)
NEUTROPHILS NFR BLD AUTO: 59 %
NITRATE UR QL: NEGATIVE
NRBC # BLD AUTO: 0 10E3/UL
NRBC BLD AUTO-RTO: 0 /100
O2/TOTAL GAS SETTING VFR VENT: 21 %
PCO2 BLDV: 49 MM HG (ref 40–50)
PH BLDV: 7.35 [PH] (ref 7.32–7.43)
PH UR STRIP: 5.5 [PH] (ref 5–7)
PLATELET # BLD AUTO: 138 10E3/UL (ref 150–450)
PLATELET # BLD AUTO: 167 10E3/UL (ref 150–450)
PO2 BLDV: 21 MM HG (ref 25–47)
POTASSIUM SERPL-SCNC: 3.8 MMOL/L (ref 3.4–5.3)
POTASSIUM SERPL-SCNC: 4.2 MMOL/L (ref 3.4–5.3)
PROT SERPL-MCNC: 5.8 G/DL (ref 6.4–8.3)
PROT SERPL-MCNC: 6.4 G/DL (ref 6.4–8.3)
RBC # BLD AUTO: 3.37 10E6/UL (ref 3.8–5.2)
RBC # BLD AUTO: 4.19 10E6/UL (ref 3.8–5.2)
RBC URINE: 0 /HPF
RSV RNA SPEC NAA+PROBE: NEGATIVE
SARS-COV-2 RNA RESP QL NAA+PROBE: NEGATIVE
SODIUM SERPL-SCNC: 135 MMOL/L (ref 136–145)
SODIUM SERPL-SCNC: 141 MMOL/L (ref 136–145)
SP GR UR STRIP: 1.01 (ref 1–1.03)
SQUAMOUS EPITHELIAL: 2 /HPF
UROBILINOGEN UR STRIP-MCNC: NORMAL MG/DL
WBC # BLD AUTO: 4.9 10E3/UL (ref 4–11)
WBC # BLD AUTO: 5 10E3/UL (ref 4–11)
WBC URINE: 43 /HPF

## 2023-06-16 PROCEDURE — 258N000003 HC RX IP 258 OP 636: Performed by: EMERGENCY MEDICINE

## 2023-06-16 PROCEDURE — 85014 HEMATOCRIT: CPT | Performed by: PHYSICIAN ASSISTANT

## 2023-06-16 PROCEDURE — 250N000013 HC RX MED GY IP 250 OP 250 PS 637: Performed by: PHYSICIAN ASSISTANT

## 2023-06-16 PROCEDURE — 81001 URINALYSIS AUTO W/SCOPE: CPT | Performed by: EMERGENCY MEDICINE

## 2023-06-16 PROCEDURE — 87086 URINE CULTURE/COLONY COUNT: CPT | Performed by: EMERGENCY MEDICINE

## 2023-06-16 PROCEDURE — 87637 SARSCOV2&INF A&B&RSV AMP PRB: CPT | Performed by: EMERGENCY MEDICINE

## 2023-06-16 PROCEDURE — 96361 HYDRATE IV INFUSION ADD-ON: CPT | Performed by: EMERGENCY MEDICINE

## 2023-06-16 PROCEDURE — 80177 DRUG SCRN QUAN LEVETIRACETAM: CPT | Performed by: PHYSICIAN ASSISTANT

## 2023-06-16 PROCEDURE — G0378 HOSPITAL OBSERVATION PER HR: HCPCS

## 2023-06-16 PROCEDURE — 74177 CT ABD & PELVIS W/CONTRAST: CPT

## 2023-06-16 PROCEDURE — 258N000001 HC RX 258: Performed by: PHYSICIAN ASSISTANT

## 2023-06-16 PROCEDURE — 250N000011 HC RX IP 250 OP 636: Performed by: EMERGENCY MEDICINE

## 2023-06-16 PROCEDURE — 96361 HYDRATE IV INFUSION ADD-ON: CPT

## 2023-06-16 PROCEDURE — 82962 GLUCOSE BLOOD TEST: CPT

## 2023-06-16 PROCEDURE — 250N000011 HC RX IP 250 OP 636: Performed by: PHYSICIAN ASSISTANT

## 2023-06-16 PROCEDURE — 74177 CT ABD & PELVIS W/CONTRAST: CPT | Mod: 26 | Performed by: RADIOLOGY

## 2023-06-16 PROCEDURE — 80053 COMPREHEN METABOLIC PANEL: CPT | Performed by: PHYSICIAN ASSISTANT

## 2023-06-16 PROCEDURE — 96375 TX/PRO/DX INJ NEW DRUG ADDON: CPT | Performed by: EMERGENCY MEDICINE

## 2023-06-16 PROCEDURE — 36415 COLL VENOUS BLD VENIPUNCTURE: CPT | Performed by: PHYSICIAN ASSISTANT

## 2023-06-16 PROCEDURE — 96375 TX/PRO/DX INJ NEW DRUG ADDON: CPT

## 2023-06-16 PROCEDURE — 250N000012 HC RX MED GY IP 250 OP 636 PS 637: Performed by: PHYSICIAN ASSISTANT

## 2023-06-16 PROCEDURE — 92610 EVALUATE SWALLOWING FUNCTION: CPT | Mod: GN

## 2023-06-16 RX ORDER — LEVETIRACETAM 500 MG/1
500 TABLET ORAL 2 TIMES DAILY
Status: DISCONTINUED | OUTPATIENT
Start: 2023-06-16 | End: 2023-06-16

## 2023-06-16 RX ORDER — NICOTINE POLACRILEX 4 MG
15-30 LOZENGE BUCCAL
Status: DISCONTINUED | OUTPATIENT
Start: 2023-06-16 | End: 2023-06-18 | Stop reason: HOSPADM

## 2023-06-16 RX ORDER — ATORVASTATIN CALCIUM 40 MG/1
40 TABLET, FILM COATED ORAL EVERY EVENING
Status: DISCONTINUED | OUTPATIENT
Start: 2023-06-16 | End: 2023-06-18 | Stop reason: HOSPADM

## 2023-06-16 RX ORDER — CARVEDILOL 12.5 MG/1
12.5 TABLET ORAL 2 TIMES DAILY
Status: DISCONTINUED | OUTPATIENT
Start: 2023-06-16 | End: 2023-06-17

## 2023-06-16 RX ORDER — HYDRALAZINE HYDROCHLORIDE 20 MG/ML
10 INJECTION INTRAMUSCULAR; INTRAVENOUS ONCE
Status: COMPLETED | OUTPATIENT
Start: 2023-06-16 | End: 2023-06-16

## 2023-06-16 RX ORDER — IOPAMIDOL 755 MG/ML
80 INJECTION, SOLUTION INTRAVASCULAR ONCE
Status: COMPLETED | OUTPATIENT
Start: 2023-06-16 | End: 2023-06-16

## 2023-06-16 RX ORDER — GABAPENTIN 100 MG/1
100 CAPSULE ORAL AT BEDTIME
Status: DISCONTINUED | OUTPATIENT
Start: 2023-06-16 | End: 2023-06-18 | Stop reason: HOSPADM

## 2023-06-16 RX ORDER — DOXAZOSIN 1 MG/1
1 TABLET ORAL AT BEDTIME
Status: DISCONTINUED | OUTPATIENT
Start: 2023-06-16 | End: 2023-06-18 | Stop reason: HOSPADM

## 2023-06-16 RX ORDER — DULOXETIN HYDROCHLORIDE 20 MG/1
20 CAPSULE, DELAYED RELEASE ORAL DAILY
Status: DISCONTINUED | OUTPATIENT
Start: 2023-06-16 | End: 2023-06-18 | Stop reason: HOSPADM

## 2023-06-16 RX ORDER — ASPIRIN 81 MG/1
81 TABLET, CHEWABLE ORAL DAILY
Status: DISCONTINUED | OUTPATIENT
Start: 2023-06-16 | End: 2023-06-18 | Stop reason: HOSPADM

## 2023-06-16 RX ORDER — METOCLOPRAMIDE HYDROCHLORIDE 5 MG/ML
5 INJECTION INTRAMUSCULAR; INTRAVENOUS ONCE
Status: COMPLETED | OUTPATIENT
Start: 2023-06-16 | End: 2023-06-16

## 2023-06-16 RX ORDER — ACETAMINOPHEN 500 MG
500 TABLET ORAL EVERY 6 HOURS PRN
Status: DISCONTINUED | OUTPATIENT
Start: 2023-06-16 | End: 2023-06-18 | Stop reason: HOSPADM

## 2023-06-16 RX ORDER — CLOPIDOGREL BISULFATE 75 MG/1
75 TABLET ORAL DAILY
Status: DISCONTINUED | OUTPATIENT
Start: 2023-06-16 | End: 2023-06-18 | Stop reason: HOSPADM

## 2023-06-16 RX ORDER — ONDANSETRON 2 MG/ML
4 INJECTION INTRAMUSCULAR; INTRAVENOUS EVERY 6 HOURS PRN
Status: DISCONTINUED | OUTPATIENT
Start: 2023-06-16 | End: 2023-06-18 | Stop reason: HOSPADM

## 2023-06-16 RX ORDER — DEXTROSE MONOHYDRATE 25 G/50ML
25-50 INJECTION, SOLUTION INTRAVENOUS
Status: DISCONTINUED | OUTPATIENT
Start: 2023-06-16 | End: 2023-06-18 | Stop reason: HOSPADM

## 2023-06-16 RX ORDER — GABAPENTIN 100 MG/1
100 CAPSULE ORAL AT BEDTIME
Status: DISCONTINUED | OUTPATIENT
Start: 2023-06-16 | End: 2023-06-16

## 2023-06-16 RX ORDER — LEVETIRACETAM 500 MG/1
500 TABLET ORAL 2 TIMES DAILY
Status: DISCONTINUED | OUTPATIENT
Start: 2023-06-16 | End: 2023-06-18 | Stop reason: HOSPADM

## 2023-06-16 RX ORDER — ONDANSETRON 4 MG/1
4 TABLET, ORALLY DISINTEGRATING ORAL EVERY 6 HOURS PRN
Status: DISCONTINUED | OUTPATIENT
Start: 2023-06-16 | End: 2023-06-18 | Stop reason: HOSPADM

## 2023-06-16 RX ADMIN — INSULIN ASPART 4 UNITS: 100 INJECTION, SOLUTION INTRAVENOUS; SUBCUTANEOUS at 17:25

## 2023-06-16 RX ADMIN — SODIUM CHLORIDE: 9 INJECTION, SOLUTION INTRAVENOUS at 14:42

## 2023-06-16 RX ADMIN — ATORVASTATIN CALCIUM 40 MG: 40 TABLET, FILM COATED ORAL at 21:18

## 2023-06-16 RX ADMIN — DEXTROSE MONOHYDRATE 25 ML: 25 INJECTION, SOLUTION INTRAVENOUS at 22:27

## 2023-06-16 RX ADMIN — METOCLOPRAMIDE 5 MG: 5 INJECTION, SOLUTION INTRAMUSCULAR; INTRAVENOUS at 04:49

## 2023-06-16 RX ADMIN — HYDRALAZINE HYDROCHLORIDE 10 MG: 20 INJECTION INTRAMUSCULAR; INTRAVENOUS at 21:18

## 2023-06-16 RX ADMIN — GABAPENTIN 100 MG: 100 CAPSULE ORAL at 21:18

## 2023-06-16 RX ADMIN — IOPAMIDOL 80 ML: 755 INJECTION, SOLUTION INTRAVENOUS at 03:33

## 2023-06-16 RX ADMIN — SODIUM CHLORIDE 1000 ML: 9 INJECTION, SOLUTION INTRAVENOUS at 00:43

## 2023-06-16 RX ADMIN — DOXAZOSIN 1 MG: 1 TABLET ORAL at 21:18

## 2023-06-16 RX ADMIN — INSULIN ASPART 4 UNITS: 100 INJECTION, SOLUTION INTRAVENOUS; SUBCUTANEOUS at 14:22

## 2023-06-16 RX ADMIN — CARVEDILOL 12.5 MG: 12.5 TABLET, FILM COATED ORAL at 22:42

## 2023-06-16 RX ADMIN — LEVETIRACETAM 500 MG: 500 TABLET, FILM COATED ORAL at 22:09

## 2023-06-16 RX ADMIN — INSULIN GLARGINE 18 UNITS: 100 INJECTION, SOLUTION SUBCUTANEOUS at 17:28

## 2023-06-16 RX ADMIN — SODIUM CHLORIDE: 9 INJECTION, SOLUTION INTRAVENOUS at 01:00

## 2023-06-16 ASSESSMENT — ACTIVITIES OF DAILY LIVING (ADL)
ADLS_ACUITY_SCORE: 29
ADLS_ACUITY_SCORE: 35
ADLS_ACUITY_SCORE: 29
ADLS_ACUITY_SCORE: 35
ADLS_ACUITY_SCORE: 29
ADLS_ACUITY_SCORE: 35
ADLS_ACUITY_SCORE: 35
ADLS_ACUITY_SCORE: 33
ADLS_ACUITY_SCORE: 35

## 2023-06-16 NOTE — PROGRESS NOTES
Shift: [id number]  Sitter 1:1 d/t confusion   VS: Vitals stable on room air, afebrile  Neuro: A&Ox1, slow speech d/t strokes  BG: ACHS  Pain/Nausea: Denies  Diet: Level 4, pureed; thin liquids; upright for all po   IV Access: PIV,  ml/hr  GI/: Voiding  Skin:  WNL  Mobility: SBA   Plan: POC, check for oral cavity pocketing meds

## 2023-06-16 NOTE — CONSULTS
Care Management Follow Up    Length of Stay (days): 0    Expected Discharge Date: 06/17/2023     Concerns to be Addressed: discharge planning     Patient plan of care discussed at interdisciplinary rounds: Yes    Anticipated Discharge Disposition: Home Care     Anticipated Discharge Services:    Anticipated Discharge DME: None    Patient/family educated on Medicare website which has current facility and service quality ratings: no  Education Provided on the Discharge Plan:  (N/A)  Patient/Family in Agreement with the Plan: yes    Referrals Placed by CM/SW:    Private pay costs discussed: Not applicable    Additional Information:  Chart reviewed. Pt was discharged yesterday, with a  CMA completed on 6/13. No CMA will be completed this admission. Please refer to CMA completed by Rosa on 6/13.     Writer spoke with Vishal (P:[phone]) over the phone to discuss and complete WILKS paperwork. Writer answered any of Vishal's questions regarding insurance coverage. Writer encouraged Vishalto follow up with pt's insurance plan directly to receive the most accurate information. Gali reports she will be coming into the hospital tonight to sign MOON form. MOON form placed in pt's room and requested staff place form on the patient's chart once signed.     Vishal reports that she wants pt to return home with her at discharge and requests the following [facility] services be set up: PT, OT, SLP. Writer believes that pt would also benefit from a RN for med management/diabetic education.       ________________    ROBIN Rice, [facility]  ED/Observation   [facility]  Phone: [phone]  Pager: [phone]  Fax: [phone]    On-call pager, [phone], 4:00pm to midnight

## 2023-06-16 NOTE — PHARMACY-ADMISSION MEDICATION HISTORY
Pharmacist Admission Medication History    Admission medication history is complete. The information provided in this note is only as accurate as the sources available at the time of the update.    Medication reconciliation/reorder completed by provider prior to medication history? No    Information Source(s): Family member via phone    Pertinent Information: uses Synthroid (brand name)  Pt's daughter Vishal (Tel [phone]) is care taker    Changes made to PTA medication list:    Added: None    Deleted: Levetiracetam (not taking), Zinc Oxide ointment (not using)    Changed: Vitamin D3 25 mcg po daily --> 125 mcg po daily    Medication Affordability:       Allergies reviewed with patient and updates made in EHR: reviewed, no changes    Medication History Completed By: Cindy Mcintosh RPH 6/16/2023 5:48 PM    Prior to Admission medications    Medication Sig Last Dose Taking? Auth Provider Long Term End Date   Vitamin D3 (CHOLECALCIFEROL) 125 MCG (5000 UT) tablet Take 125 mcg by mouth daily  Yes Unknown, Entered By History     acetaminophen (TYLENOL) 500 MG tablet Take 500 mg by mouth every 6 hours as needed for mild pain   Reported, Patient     aspirin (ASA) 81 MG chewable tablet Take 1 tablet (81 mg) by mouth daily   Campbell Macias MD No    atorvastatin (LIPITOR) 40 MG tablet Take 1 tablet (40 mg) by mouth daily   Bony Castaneda MD Yes    blood glucose monitoring (NO BRAND SPECIFIED) meter device kit Use to test blood sugar 4 times daily.  Please provide glucose meter that is covered by insurance.   Bony Castaneda MD     carvedilol (COREG) 12.5 MG tablet Take 1 tablet (12.5 mg) by mouth 2 times daily (with meals)   Bony Castaneda MD Yes    clopidogrel (PLAVIX) 75 MG tablet 1 tablet (75 mg) by Oral or NG Tube route daily   Patricio Mata MD Yes    cyanocobalamin (VITAMIN B-12) 1000 MCG tablet Take 1 tablet (1,000 mcg) by mouth daily   Bony Castaneda MD     diclofenac  (VOLTAREN) 1 % topical gel Apply 2 g topically 4 times daily as needed for moderate pain   Bony Castaneda MD     doxazosin (CARDURA) 1 MG tablet Take 1 tablet (1 mg) by mouth At Bedtime   Bony Castaneda MD Yes    DULoxetine (CYMBALTA) 20 MG capsule Take 1 capsule (20 mg) by mouth daily   Bony Castaneda MD Yes    gabapentin (NEURONTIN) 100 MG capsule Take 1 capsule (100 mg) by mouth At Bedtime   Bony Castaneda MD Yes    insulin aspart (NOVOLOG PEN) 100 UNIT/ML pen Inject 4 Units Subcutaneous 3 times daily (with meals)   Carmen Driscoll MD Yes    insulin aspart (NOVOLOG PEN) 100 UNIT/ML pen Inject 1-6 Units Subcutaneous 3 times daily (with meals) Correction Scale - MEDIUM INSULIN RESISTANCE DOSING     Do Not give Correction Insulin if BG less than 140.  For  - 189 give 1 unit.  For  - 239 give 2 units.  For  - 289 give 3 units.  For  - 339 give 4 units.  For  - 399 give 5 units.  For BG greater than or equal to 400 give 6 units.  Check blood glucose 3 times daily with meals and administer based on blood glucose.  Notify provider if glucose greater than or equal to 350 mg/dL after administration of correction dose.  If given at mealtime, administer within 30 minutes of start of meal.   Carmen Driscoll MD Yes    insulin glargine (LANTUS PEN) 100 UNIT/ML pen Inject 22 Units Subcutaneous every 24 hours   Carmen Driscoll MD Yes    insulin pen needle (31G X 8 MM) 31G X 8 MM miscellaneous Use 4 pen needles daily or as directed.   Bony Castaneda MD     levETIRAcetam (KEPPRA) 500 MG tablet Take 1 tablet (500 mg) by mouth 2 times daily  Patient not taking: Reported on 6/16/2023 Not Taking  Campbell Macias MD Yes    loratadine (CLARITIN) 10 mg tablet [LORATADINE (CLARITIN) 10 MG TABLET] Take 10 mg by mouth daily.   Provider, Historical     melatonin 10 mg Tab Take 10 mg by mouth nightly as needed   Provider, Historical      METHYLCELLULOSE, LAXATIVE, PO Take 1 Tablespoonful by mouth daily as needed   Reported, Patient     nystatin (MYCOSTATIN) 704340 UNIT/GM external cream Apply topically 2 times daily Until rash clear   Bony Castaneda MD     ondansetron (ZOFRAN ODT) 4 MG ODT tab Take 1 tablet (4 mg) by mouth every 8 hours as needed for nausea   Christian Palomares MD     prochlorperazine (COMPAZINE) 10 MG tablet Take 1 tablet (10 mg) by mouth every 6 hours as needed for nausea or vomiting   Christian Palomares MD     SYNTHROID 75 MCG tablet Take 1 tablet (75 mcg) by mouth daily   Bony Castaneda MD Yes    TRUE METRIX BLOOD GLUCOSE TEST test strip USE TO TEST BLOOD SUGAR 4 TO 5 TIMES DAILY OR AS DIRECTED   Bony Castaneda MD     Vitamin D3 (CHOLECALCIFEROL) 25 mcg (1000 units) tablet Take 1 tablet (25 mcg) by mouth daily  Patient not taking: Reported on 6/16/2023 Not Taking  Bony Castaneda MD     zinc oxide (DESITIN) 20 % external ointment Apply topically as needed for dry skin or irritation  Patient not taking: Reported on 6/16/2023 Not Taking  Bony Castaneda MD

## 2023-06-16 NOTE — PROGRESS NOTES
06/16/23 0599   Appointment Info   Signing Clinician's Name / Credentials (SLP) Lorena Nascimento MA CCC-SLP   General Information   Onset of Illness/Injury or Date of Surgery 06/16/23   Referring Physician Destinee Castro PA-C   Patient/Family Therapy Goal Statement (SLP) None stated   Pertinent History of Current Problem Pt is a 62 year old female with a past medical history of HTN, HLD, type I diabetes mellitus, CKD stage 3b, hyperglycemia, hyperthyroidism, dementia, seizures, and previous stroke, admitted for n/v. H&P not completed at time of eval. Per RN, pt continues to pocket pills, c/f choking.   General Observations Alert, upright at EOB   Type of Evaluation   Type of Evaluation Swallow Evaluation   Oral Motor   Oral Musculature unable to assess due to poor participation/comprehension  (appears c/w baseline)   Structural Abnormalities none present   Mucosal Quality adequate   Dentition (Oral Motor)   Dentition (Oral Motor) adequate dentition   Cough/Swallow/Gag Reflex (Oral Motor)   Comment, Cough/Swallow/Gag Reflex (Oral Motor) unable to assess   Vocal Quality/Secretion Management (Oral Motor)   Vocal Quality (Oral Motor) WNL   General Swallowing Observations   Current Diet/Method of Nutritional Intake (General Swallowing Observations, NIS) pureed (level 4);thin liquids (level 0)   Respiratory Support (General Swallowing Observations) none   Past History of Dysphagia Pt familiar to this service, was seen last admission and baseline diet of puree and thins recommended.   Swallowing Evaluation Clinical swallow evaluation   Clinical Swallow Evaluation   Feeding Assistance set up only required   Clinical Swallow Evaluation Textures Trialed thin liquids;pureed   Clinical Swallow Eval: Thin Liquid Texture Trial   Mode of Presentation, Thin Liquids self-fed;cup   Volume of Liquid or Food Presented 2oz hot cocoa   Oral Phase of Swallow   (bolus holding)   Pharyngeal Phase of Swallow intact    Diagnostic Statement Pt required cues to swallow but no overt s/sx of aspiration - c/w baseline   Clinical Swallow Evaluation: Puree Solid Texture Trial   Mode of Presentation, Puree spoon;fed by clinician   Volume of Puree Presented 1oz applesauce   Oral Phase, Puree   (bolus holding)   Pharyngeal Phase, Puree intact   Diagnostic Statement Bolus holding but no overt s/sx of aspiration when pt finally swallowed   Esophageal Phase of Swallow   Patient reports or presents with symptoms of esophageal dysphagia No   Swallowing Recommendations   Diet Consistency Recommendations thin liquids (level 0);pureed (level 4)   Supervision Level for Intake 1:1 supervision needed   Mode of Delivery Recommendations slow rate of intake   Swallowing Maneuver Recommendations alternate food and liquid intake;extra swallow   Monitoring/Assistance Required (Eating/Swallowing) stop eating activities when fatigue is present;cue for finger/lingual sweep if oral pocketing present;check mouth frequently for oral residue/pocketing;optimize oral intake to minimize need for tube feeding;monitor for cough or change in vocal quality with intake   Recommended Feeding/Eating Techniques (Swallow Eval) maintain upright sitting position for eating;maintain upright posture during/after eating for 30 minutes;minimize distractions during oral intake;provide assist with feeding;provide oral hygiene prior to intake;provide 6 smaller meals throughout day;set-up and prepare tray   Medication Administration Recommendations, Swallowing (SLP) crush pills if able and serve in puree - pt will need constant cues to clear oral cavity   Instrumental Assessment Recommendations instrumental evaluation not recommended at this time   Clinical Impression   Criteria for Skilled Therapeutic Interventions Met (SLP Eval) No problems identified which require skilled intervention;Current level of function same as previous level of function   SLP Diagnosis Swallow mechanism  c/w baseline, oral dysphagia in setting of dementia   Risks & Benefits of therapy have been explained evaluation/treatment results reviewed;care plan/treatment goals reviewed;risks/benefits reviewed;current/potential barriers reviewed;participants voiced agreement with care plan;participants included;patient   Clinical Impression Comments   Clinical swallow eval completed per MD order. Pt's swallow function is c/w baseline; mild-moderate oral dysphagia d/t cog deficits in setting of dementia. Limited oral mech exam unremarkable. Pt assessed with thin liquids and applesauce. Bolus holding across consistencies but no overt s/sx of aspiration when pt cued to swallow. Recommend the pt continue her baseline diet of puree (IDDSI 4) and thin liquids with 1:1 assistance. Ensure the pt is upright for all PO. Check oral cavity for pocketing and cue the pt to swallow. No additional SLP services indicated.     SLP Total Evaluation Time   Eval: oral/pharyngeal swallow function, clinical swallow Minutes (60606) 16   SLP Discharge Planning   SLP Plan s/o   SLP Discharge Recommendation defer to MD   SLP Rationale for DC Rec Swallow function c/w baseline   SLP Brief overview of current status  Recommend the pt continue her baseline diet of puree (IDDSI 4) and thin liquids with 1:1 assistance. Ensure the pt is upright for all PO. Check oral cavity for pocketing and cue the pt to swallow. No additional SLP services indicated.   Total Session Time   Total Session Time (sum of timed and untimed services) 16     [facility]  OUTPATIENT SPEECH LANGUAGE PATHOLOGY EVALUATION  PLAN OF TREATMENT FOR OUTPATIENT REHABILITATION  (COMPLETE FOR INITIAL CLAIMS ONLY)  Patient's Last Name, First Name, M.I.  YOB: 1960  Isabell Matthews                        Provider's Name  [facility] Medical Record No.  [id number]                             Onset Date:  06/16/23  Start of  Care Date: 06/16/23    Type:     ___PT   ___OT   _X_SLP Medical Diagnosis: Dysphagia          SLP Diagnosis:  Swallow mechanism c/w baseline, oral dysphagia in setting of dementia  Visits from SOC:  1     See note for plan of treatment, functional goals and certification details    I CERTIFY THE NEED FOR THESE SERVICES FURNISHED UNDER        THIS PLAN OF TREATMENT AND WHILE UNDER MY CARE     (Physician co-signature of this document indicates review and certification of the therapy plan).

## 2023-06-16 NOTE — TELEPHONE ENCOUNTER
Daughter called.  She is patients caregiver.  Patient gave permission to speak to daughter regarding her care.        Nurse Triage SBAR    Is this a 2nd Level Triage? YES, LICENSED PRACTITIONER REVIEW IS REQUIRED    Situation:   Patient is vomiting.    Background:   Patient was hospitalized for DKA.  Patient was discharged today around 5pm.  Patient is a type 1 diabetic with a long health history.    Assessment:   When daughter got to the hospital patient was vomiting.  Patient was given compazine orally and IV zofan.  Patient continued to throw up.  Patient threw up in the car on the way home, then at 6:30pm daughter was given soup and toast.    Blood sugar is 238 currently    No fever, no confusion, no severe weakness, no eye pain, no injury, is drinking and urinating.    Protocol Recommended Disposition:   No disposition on file.    Recommendation:   What would you recommend?  Go to the ED     Paged to provider Dr Menard    Does the patient meet one of the following criteria for ADS visit consideration? 16+ years old, with an [facility] PCP     Provider Recommendation Follow Up:   Reached patient/caregiver. Informed of provider's recommendations. Patient verbalized understanding and agrees with the plan.       Reason for Disposition    [1] MODERATE vomiting (e.g., 3 - 5 times/day) AND [2] age > 60 years    Additional Information    Negative: Shock suspected (e.g., cold/pale/clammy skin, too weak to stand, low BP, rapid pulse)    Negative: Difficult to awaken or acting confused (e.g., disoriented, slurred speech)    Negative: Sounds like a life-threatening emergency to the triager    Negative: Vomiting occurs only while coughing    Negative: [1] Pregnant < 20 Weeks AND [2] nausea/vomiting began in early pregnancy (i.e., 4-8 weeks pregnant)    Negative: Chest pain    Negative: Headache is main symptom    Negative: Vomiting (or Nausea) in a cancer patient who is currently (or recently) receiving chemotherapy or radiation  "therapy, or cancer patient who has metastatic or end-stage cancer and is receiving palliative care    Negative: [1] Vomiting AND [2] contains red blood or black (\"coffee ground\") material  (Exception: few red streaks in vomit that only happened once)    Negative: Severe pain in one eye    Negative: Recent head injury (within last 3 days)    Negative: Recent abdominal injury (within last 3 days)    Negative: [1] Insulin-dependent diabetes (Type I) AND [2] glucose > 400 mg/dl (22 mmol/l)    Negative: [1] Vomiting AND [2] hernia is more painful or swollen than usual    Negative: [1] SEVERE vomiting (e.g., 6 or more times/day) AND [2] present > 8 hours (Exception: patient sounds well, is drinking liquids, does not sound dehydrated, and vomiting has lasted less than 24 hours)    Protocols used: VOMITING-A-AH      "

## 2023-06-16 NOTE — ED TRIAGE NOTES
Pt arrives to ED with c/o vomiting after eating anything. Pt c/o intermitted abdominal pain and chronic back pain. Recently was hospitalized for DKA. Hypertensive at 166/103 in triage, otherwise VSS. Blood glucose 214 in triage.      Triage Assessment     Row Name 06/15/23 [id number]       Triage Assessment (Adult)    Airway WDL WDL       Respiratory WDL    Respiratory WDL WDL       Skin Circulation/Temperature WDL    Skin Circulation/Temperature WDL WDL       Cardiac WDL    Cardiac WDL X  Hypertension       Peripheral/Neurovascular WDL    Peripheral Neurovascular WDL WDL       Cognitive/Neuro/Behavioral WDL    Cognitive/Neuro/Behavioral WDL X    Level of Consciousness alert    Arousal Level opens eyes spontaneously    Orientation disoriented to;time    Speech slow;spontaneous    Mood/Behavior calm;cooperative       Pupils (CN II)    Pupil PERRLA yes    Pupil Size Left 2 mm    Pupil Size Right 2 mm       Nina Coma Scale    Best Eye Response 4-->(E4) spontaneous    Best Motor Response 6-->(M6) obeys commands    Best Verbal Response 5-->(V5) oriented    Nina Coma Scale Score 15

## 2023-06-16 NOTE — H&P
[facility] ED Observation Admission Note    Chief Complaint   Patient presents with     Vomiting       Assessment/Plan:  Isabell Matthews is a 62 year old female with a past medical history of HTN, HLD, type I diabetes mellitus, CKD stage 3b, hyperglycemia, hyperthyroidism, dementia, seizures, and previous stroke.    #T1DM  #Generalized weakness  #Persistent nausea and vomiting  Patient is a poor historian, family unreachable. History mainly provided by medical records. Per chart review, patient was recently hospitalized for DKA and was discharged yesterday 6/15 at around 5pm. Per ED note, patient's daughter reported that patient continued to have episodes of emesis after leaving the hospital. As result, she called triage and was instructed to return the patient to the ER. Patient on insulin, but it is unclear if she is complaint given her cognitive decline 2/2 vascular vs alzheimer's. Hx of multiple hospitalizations for DKA. She was seen by endocrine in this last admission and was discharged on lantus 18 units q 24hr, aspart 4 units TID with meals, and aspart medium correction scale. Upon arrival in the ED, she was noted to be nauseous. She was given IV antiemetics and IVF with mild improvement. VSS: T 98.1 P 82 /103 RR 16 O2 100% RA. LAB: CMP shows mild hyponatremia (Na 135) K+ 4.2, Cr/BUN 1.44/17, slightly up from baseline 1.33. No signs of DKA with AG serum ketones being wnl. Alk phos mildly elevated at 147, AST and ALT, and lipase wnl. No indication of infection process, lactic acid wnl, no leukocytosis. HGB stable at 12.4. VBG edwin, no signs of acidosis. Covid19/influnze A/B negative. CT abdomen, a very small amount of nonspecific free fluid in the pelvis. No other convincing cause of acute pain identified in the abdomen or pelvis. No evidence of bowel obstruction. Colonic diverticulosis without evidence of diverticulitis. Patient is admitted to ed observation unit for n/v management and PT assessment.   - VS per  routine  -  ml/hr   - Lantus 18 units daily  - Aspart 4 units with meals  - Aspart medium correction scale  - BG checks TID ac and Qhs  - Carbohydrate Consistent Diet  - Hypoglycemic protocol  - Antiemetics: Zofran, compazine prn  - PT assessment     #Seizures  #CVA  She has a past medical history significant for strokes, with recent CTA showing critical M1/M2 stenosis and most recent hospitalization in May 2023 at neurology service. She does not complain of headache today. She does have delayed speech, but that was noted in previous admission as well. The patient is essentially aphasic but with intact comprehension and able to follow command. Patient noted to be pocketing bills and holding food in her mouth. She did have SLP evaluation recently with recommendation for soft mechanical diet. No neurologic deficits on exam. She is  standby assist.   - PTA Aspirin 81 mg daily  - PTA Plavix 75 mg daily   - PTA Keppra 500 mg BID   - PTA Atorvastatin 40 mg daily   - Keppra levels  - Follow UA/UC  - SLP consult     #HTN  - PTA Carvedilol 12.5 mg BID   - PTA Doxazosin 1 mg HS     #CKD  Cr 1.44. Baseline creatinine ~1.7.   - Rehydrate  - Trend renal function  - Avoid nephrotoxic agents  - Check UA/UC     #Hypothyroidism  - PTA levothyroxin 75 mg daily      #Dementia   2/2 Vascular vs Alzheimer's.  On her 2021 MRI, no signs suggestive of CVA.  She did have microhemorrhages on SWI, but all of these were deep.    #Deprssion:  - PTA Duloxetine 20 mg daily              HPI:    The history is provided by the patient, medical records and a relative. The history is limited by the condition of the patient (Dementia).      Isabell Matthews is a 62 year old female with a past medical history of HTN, HLD, type I diabetes mellitus, CKD stage 3b, hyperglycemia, hyperthyroidism, dementia, seizures, and previous stroke.     Per chart review, patient was hospitalized at [facility] and was discharged (06/15/23) at around 5pm for DKA. Patients  "daughter called nurse triage and said that patient was vomiting when daughter got to hospital and continued to throw up in the car ride home. Patient was then recommended to be seen here at ED.      Pt came to in ED early Sunday - saw signs of DKA right away - ended up getting sick. She was discharged. During hospitalization Sunday-Thursday morning no throwing up. She started throwing up Thursday morning.      Breakfast didn't stay down and her lunch came up - was given zofran through IV - 30 mins later was eating then 30 mins later threw up again. Daughter picked her up at 5pm (06/15/2023) and during car ride home threw up again. Was given Prochlorperazine Maleate for nausea at 6pm. At 7pm threw up again - called nurse who called doctor who was told to bring her back.     Daughter stated that even with a bite or two her mother throws up half an hour later. She doesn't know if it's something internal. Patient says she feels \"fine\", a little bit of pain around abdominal area, daughter reported that the pt had complains about her left side stomach area before arrival to ED. Abdominal pain described as sharp but not consistent.      Pt is urinating okay. Was able to sip on water throughout the day.      Upon arrival in the ED, she was noted to be nauseous. She was given IV antiemetics and IVF with mild improvement. VSS: T 98.1 P 82 /103 RR 16 O2 100% RA. LAB: CMP shows mild hyponatremia (Na 135) K+ 4.2, Cr/BUN 1.44/17, slightly up from baseline 1.33. No signs of DKA with AG serum ketones being wnl. Alk phos mildly elevated at 147, AST and ALT, and lipase wnl. No indication of infection process, lactic acid wnl, no leukocytosis. HGB stable at 12.4. VBG edwin, no signs of acidosis. Covid19/influnze A/B negative. CT abdomen, a very small amount of nonspecific free fluid in the pelvis. No other convincing cause of acute pain identified in the abdomen or pelvis. No evidence of bowel obstruction. Colonic diverticulosis " without evidence of diverticulitis. Patient is admitted to ed observation unit for n/v management and PT assessment.     On admission to the observation unit the patient was stable    History:    Past Medical History:   Diagnosis Date     Chronic pain      Coronary atherosclerosis 2016     Essential hypertension      Ex-cigarette smoker     quit 2018 over 35 years     Ex-cigarette smoker      Hyperlipidemia      Hypothyroid      Seizures (H)      Stroke (H)      Vascular dementia (H)        Past Surgical History:   Procedure Laterality Date     athroplasty hip Bilateral     ,      OTHER SURGICAL HISTORY      athroplasty hip     PICC DOUBLE LUMEN PLACEMENT Right 2023    right basilic 5 fr dl power picc 39 cm     STENT         Family History   Problem Relation Age of Onset     Acute Myocardial Infarction Father      Heart Disease Maternal Grandmother      Dementia Maternal Grandmother      Myocardial Infarction Father      Dementia Paternal Grandmother      Heart Disease Paternal Grandmother        Social History     Socioeconomic History     Marital status:      Spouse name: Not on file     Number of children: Not on file     Years of education: Not on file     Highest education level: Not on file   Occupational History     Not on file   Tobacco Use     Smoking status: Former     Packs/day: 0.50     Years: 35.00     Pack years: 17.50     Types: Cigarettes     Quit date: 2018     Years since quittin.9     Smokeless tobacco: Never   Vaping Use     Vaping status: Not on file   Substance and Sexual Activity     Alcohol use: Not Currently     Drug use: Not Currently     Sexual activity: Not Currently   Other Topics Concern     Parent/sibling w/ CABG, MI or angioplasty before 65F 55M? Not Asked   Social History Narrative    ** Merged History Encounter **         Recently moved to [facility] with Daughter Charli who is her pca     Social Determinants of Health     Financial Resource  Strain: Not on file   Food Insecurity: Not on file   Transportation Needs: Not on file   Physical Activity: Not on file   Stress: Not on file   Social Connections: Not on file   Intimate Partner Violence: Not on file   Housing Stability: Not on file       [COMPLETED] ondansetron (ZOFRAN) injection 4 mg    acetaminophen (TYLENOL) 500 MG tablet, Take 500 mg by mouth every 6 hours as needed for mild pain  aspirin (ASA) 81 MG chewable tablet, Take 1 tablet (81 mg) by mouth daily  atorvastatin (LIPITOR) 40 MG tablet, Take 1 tablet (40 mg) by mouth daily  blood glucose monitoring (NO BRAND SPECIFIED) meter device kit, Use to test blood sugar 4 times daily.  Please provide glucose meter that is covered by insurance.  carvedilol (COREG) 12.5 MG tablet, Take 1 tablet (12.5 mg) by mouth 2 times daily (with meals)  clopidogrel (PLAVIX) 75 MG tablet, 1 tablet (75 mg) by Oral or NG Tube route daily  cyanocobalamin (VITAMIN B-12) 1000 MCG tablet, Take 1 tablet (1,000 mcg) by mouth daily  diclofenac (VOLTAREN) 1 % topical gel, Apply 2 g topically 4 times daily as needed for moderate pain  doxazosin (CARDURA) 1 MG tablet, Take 1 tablet (1 mg) by mouth At Bedtime  DULoxetine (CYMBALTA) 20 MG capsule, Take 1 capsule (20 mg) by mouth daily  gabapentin (NEURONTIN) 100 MG capsule, Take 1 capsule (100 mg) by mouth At Bedtime  insulin aspart (NOVOLOG PEN) 100 UNIT/ML pen, Inject 4 Units Subcutaneous 3 times daily (with meals)  insulin aspart (NOVOLOG PEN) 100 UNIT/ML pen, Inject 1-6 Units Subcutaneous 3 times daily (with meals) Correction Scale - MEDIUM INSULIN RESISTANCE DOSING     Do Not give Correction Insulin if BG less than 140.  For  - 189 give 1 unit.  For  - 239 give 2 units.  For  - 289 give 3 units.  For  - 339 give 4 units.  For  - 399 give 5 units.  For BG greater than or equal to 400 give 6 units.  Check blood glucose 3 times daily with meals and administer based on blood glucose.  Notify  provider if glucose greater than or equal to 350 mg/dL after administration of correction dose.  If given at mealtime, administer within 30 minutes of start of meal.  insulin glargine (LANTUS PEN) 100 UNIT/ML pen, Inject 18 Units Subcutaneous every 24 hours  insulin pen needle (31G X 8 MM) 31G X 8 MM miscellaneous, Use 4 pen needles daily or as directed.  levETIRAcetam (KEPPRA) 500 MG tablet, Take 1 tablet (500 mg) by mouth 2 times daily  loratadine (CLARITIN) 10 mg tablet, [LORATADINE (CLARITIN) 10 MG TABLET] Take 10 mg by mouth daily.  melatonin 10 mg Tab, Take 10 mg by mouth nightly as needed  METHYLCELLULOSE, LAXATIVE, PO, Take 1 Tablespoonful by mouth daily as needed  nystatin (MYCOSTATIN) 215246 UNIT/GM external cream, Apply topically 2 times daily Until rash clear  ondansetron (ZOFRAN ODT) 4 MG ODT tab, Take 1 tablet (4 mg) by mouth every 8 hours as needed for nausea  prochlorperazine (COMPAZINE) 10 MG tablet, Take 1 tablet (10 mg) by mouth every 6 hours as needed for nausea or vomiting  SYNTHROID 75 MCG tablet, Take 1 tablet (75 mcg) by mouth daily  TRUE METRIX BLOOD GLUCOSE TEST test strip, USE TO TEST BLOOD SUGAR 4 TO 5 TIMES DAILY OR AS DIRECTED  Vitamin D3 (CHOLECALCIFEROL) 25 mcg (1000 units) tablet, Take 1 tablet (25 mcg) by mouth daily  zinc oxide (DESITIN) 20 % external ointment, Apply topically as needed for dry skin or irritation        Data:    Results for orders placed or performed during the hospital encounter of 06/16/23   CT Abdomen Pelvis w Contrast     Status: None    Narrative    EXAM: CT ABDOMEN AND PELVIS WITH CONTRAST  LOCATION: [facility]  DATE/TIME: 6/16/2023 3:47 AM CDT    INDICATION: Left lower quadrant pain. Persistent nausea and vomiting.  COMPARISON: 04/10/2022 - CT chest, abdomen and pelvis.    TECHNIQUE: CT scan of the abdomen and pelvis was performed following injection of IV contrast. Multiplanar reformats were obtained. Dose  reduction techniques were used.  CONTRAST: 80 mL Isovue 370.    FINDINGS:    LOWER CHEST: A trace amount of bilateral pleural fluid. Coronary artery calcification.    HEPATOBILIARY: Unremarkable.    SPLEEN: Unremarkable.    PANCREAS: Unremarkable.    ADRENAL GLANDS: Unremarkable.    KIDNEYS/BLADDER: Moderate multifocal bilateral renal atrophy. A 3.0 x 2.0 cm mildly heterogeneous low-attenuation region within the central aspect of the upper pole of the left kidney (series 5 image 126). This is nonspecific and not well characterized   on this study, but has decreased in size since the comparison study dated 04/10/2022 on which it measured 3.6 x 3.2 cm.    BOWEL: No dilatation of the small or large bowel. A few colonic diverticula are present, without evidence of diverticulitis. The appendix is not visualized. No visualized bowel wall thickening, pneumatosis or free intraperitoneal gas.    LYMPH NODES: Unremarkable.    PELVIC ORGANS: 5.0 x 3.2 cm relatively homogeneous intermediate attenuation ovoid structure in the right hemipelvis, abutting the uterus (series 4 image 307). On the comparison study dated 04/10/2022, this appeared to be an exophytic uterine fibroid.    MUSCULOSKELETAL: Partial visualization of bilateral hip arthroplasties.    OTHER: A very small amount of free fluid in the pelvis.      Impression    IMPRESSION:   1.  A very small amount of nonspecific free fluid in the pelvis.  2.  No other convincing cause of acute pain identified in the abdomen or pelvis. No evidence of bowel obstruction.  3.  Colonic diverticulosis without evidence of diverticulitis.                 Ketone Beta-Hydroxybutyrate Quantitative     Status: Normal   Result Value Ref Range    Ketone (Beta-Hydroxybutyrate) Quantitative 0.30 <=0.30 mmol/L   Blood gas venous     Status: Abnormal   Result Value Ref Range    pH Venous 7.35 7.32 - 7.43    pCO2 Venous 49 40 - 50 mm Hg    pO2 Venous 21 (L) 25 - 47 mm Hg    Bicarbonate Venous 27 21  - 28 mmol/L    Base Excess/Deficit (+/-) 0.5 -7.7 - 1.9 mmol/L    FIO2 21    Comprehensive metabolic panel     Status: Abnormal   Result Value Ref Range    Sodium 135 (L) 136 - 145 mmol/L    Potassium 4.2 3.4 - 5.3 mmol/L    Chloride 102 98 - 107 mmol/L    Carbon Dioxide (CO2) 20 (L) 22 - 29 mmol/L    Anion Gap 13 7 - 15 mmol/L    Urea Nitrogen 17.3 8.0 - 23.0 mg/dL    Creatinine 1.44 (H) 0.51 - 0.95 mg/dL    Calcium 9.1 8.8 - 10.2 mg/dL    Glucose 236 (H) 70 - 99 mg/dL    Alkaline Phosphatase 147 (H) 35 - 104 U/L    AST 37 0 - 45 U/L    ALT 35 0 - 50 U/L    Protein Total 6.4 6.4 - 8.3 g/dL    Albumin 3.7 3.5 - 5.2 g/dL    Bilirubin Total 0.3 <=1.2 mg/dL    GFR Estimate 40 (L) >60 mL/min/1.73m2   Lactic acid whole blood     Status: Normal   Result Value Ref Range    Lactic Acid 1.6 0.7 - 2.0 mmol/L   Magnesium     Status: Abnormal   Result Value Ref Range    Magnesium 2.5 (H) 1.7 - 2.3 mg/dL   CBC with platelets and differential     Status: None   Result Value Ref Range    WBC Count 5.0 4.0 - 11.0 10e3/uL    RBC Count 4.19 3.80 - 5.20 10e6/uL    Hemoglobin 12.4 11.7 - 15.7 g/dL    Hematocrit 39.2 35.0 - 47.0 %    MCV 94 78 - 100 fL    MCH 29.6 26.5 - 33.0 pg    MCHC 31.6 31.5 - 36.5 g/dL    RDW 14.6 10.0 - 15.0 %    Platelet Count 167 150 - 450 10e3/uL    % Neutrophils 59 %    % Lymphocytes 34 %    % Monocytes 5 %    % Eosinophils 2 %    % Basophils 0 %    % Immature Granulocytes 0 %    NRBCs per 100 WBC 0 <1 /100    Absolute Neutrophils 3.0 1.6 - 8.3 10e3/uL    Absolute Lymphocytes 1.7 0.8 - 5.3 10e3/uL    Absolute Monocytes 0.3 0.0 - 1.3 10e3/uL    Absolute Eosinophils 0.1 0.0 - 0.7 10e3/uL    Absolute Basophils 0.0 0.0 - 0.2 10e3/uL    Absolute Immature Granulocytes 0.0 <=0.4 10e3/uL    Absolute NRBCs 0.0 10e3/uL   Extra Tube     Status: None    Narrative    The following orders were created for panel order Extra Tube.  Procedure                               Abnormality         Status                      ---------                               -----------         ------                     Extra Blue Top Tube[[id number]]                              Final result               Extra Red Top Tube[[id number]]                               Final result                 Please view results for these tests on the individual orders.   Extra Blue Top Tube     Status: None   Result Value Ref Range    Hold Specimen JIC    Extra Red Top Tube     Status: None   Result Value Ref Range    Hold Specimen JIC    Glucose by meter     Status: Abnormal   Result Value Ref Range    GLUCOSE BY METER POCT 214 (H) 70 - 99 mg/dL   Lipase     Status: Normal   Result Value Ref Range    Lipase 27 13 - 60 U/L   Asymptomatic Influenza A/B, RSV, & SARS-CoV2 PCR (COVID-19) Nose     Status: Normal    Specimen: Nose; Swab   Result Value Ref Range    Influenza A PCR Negative Negative    Influenza B PCR Negative Negative    RSV PCR Negative Negative    SARS CoV2 PCR Negative Negative    Narrative    Testing was performed using the Xpert Xpress CoV2/Flu/RSV Assay on the [employer] GeneXpert Instrument. This test should be ordered for the detection of SARS-CoV-2, influenza, and RSV viruses in individuals who meet clinical and/or epidemiological criteria. Test performance is unknown in asymptomatic patients. This test is for in vitro diagnostic use under the FDA EUA for laboratories certified under CLIA to perform high or moderate complexity testing. This test has not been FDA cleared or approved. A negative result does not rule out the presence of PCR inhibitors in the specimen or target RNA in concentration below the limit of detection for the assay. If only one viral target is positive but coinfection with multiple targets is suspected, the sample should be re-tested with another FDA cleared, approved, or authorized test, if coinfection would change clinical management. This test was validated by the [facility]. These laboratories are  certified under the Clinical Laboratory Improvement Amendments of 1988 (CLIA-88) as qualified to perform high complexity laboratory testing.   CBC with platelets     Status: Abnormal   Result Value Ref Range    WBC Count 4.9 4.0 - 11.0 10e3/uL    RBC Count 3.37 (L) 3.80 - 5.20 10e6/uL    Hemoglobin 10.0 (L) 11.7 - 15.7 g/dL    Hematocrit 31.4 (L) 35.0 - 47.0 %    MCV 93 78 - 100 fL    MCH 29.7 26.5 - 33.0 pg    MCHC 31.8 31.5 - 36.5 g/dL    RDW 14.7 10.0 - 15.0 %    Platelet Count 138 (L) 150 - 450 10e3/uL   Comprehensive metabolic panel     Status: Abnormal   Result Value Ref Range    Sodium 141 136 - 145 mmol/L    Potassium 3.8 3.4 - 5.3 mmol/L    Chloride 109 (H) 98 - 107 mmol/L    Carbon Dioxide (CO2) 21 (L) 22 - 29 mmol/L    Anion Gap 11 7 - 15 mmol/L    Urea Nitrogen 16.4 8.0 - 23.0 mg/dL    Creatinine 1.42 (H) 0.51 - 0.95 mg/dL    Calcium 8.3 (L) 8.8 - 10.2 mg/dL    Glucose 241 (H) 70 - 99 mg/dL    Alkaline Phosphatase 129 (H) 35 - 104 U/L    AST 29 0 - 45 U/L    ALT 31 0 - 50 U/L    Protein Total 5.8 (L) 6.4 - 8.3 g/dL    Albumin 3.3 (L) 3.5 - 5.2 g/dL    Bilirubin Total 0.2 <=1.2 mg/dL    GFR Estimate 41 (L) >60 mL/min/1.73m2   CBC with platelets differential     Status: None    Narrative    The following orders were created for panel order CBC with platelets differential.  Procedure                               Abnormality         Status                     ---------                               -----------         ------                     CBC with platelets and d...[[id number]]                      Final result                 Please view results for these tests on the individual orders.             EKG Interpretation:           ROS:  REVIEW OF SYSTEMS:   General: fatigue   EYES: Negative for vision changes or eye problems   ENT: No problems with ears, nose or throat. No difficulty swallowing.   RESP: No coughing, wheezing or shortness of breath   CV: No chest pains or palpitations   GI: Positive for  nausea and vomiting. No heartburn, abdominal pain, diarrhea, constipation or change in bowel habits   : No urinary frequency or dysuria, bladder or kidney problems   MUSCULOSKELETAL: No significant muscle or joint pains   NEUROLOGIC: No headaches, numbness, tingling, weakness, problems with balance or coordination   PSYCHIATRIC: No problems with anxiety, depression or mental health   HEME/IMMUNE/ALLERGY: No history of bleeding or clotting problems or anemia. No allergies or immune system problems   ENDOCRINE: No history of thyroid disease, diabetes or other endocrine disorders   SKIN: No rashes,worrisome lesions or skin problems      PCP: Bony Castaneda MD  CARDIAC RISK: CAD. HTN, HLD     10 point ROS negative other than the symptoms noted above.      Exam:    Vitals:  B/P: 156/83, T: 98.1, P: 82, R: 16    Physical Exam   Constitutional: Pt is oriented to person, place, and time.Pt appears well-developed and well-nourished.   HENT:   Head: Normocephalic and atraumatic.   Eyes: Conjunctivae are normal. Pupils are equal, round, and reactive to light.   Neck: Normal range of motion. Neck supple.   Cardiovascular: Normal rate, regular rhythm, normal heart sounds and intact distal pulses.    Pulmonary/Chest: Effort normal and breath sounds normal. No respiratory distress. Pt has no wheezes. Pt has no rales  Abdominal: Soft. Bowel sounds are normal. Pt exhibits no distension and no mass. No tenderness. Pt has no rebound and no guarding.   Musculoskeletal: Normal range of motion. Pt exhibits no edema.   Neurological: Pt is alert and oriented to person, place, and time. Normal reflexes.   Skin: Skin is warm and dry. No rash noted.   Psychiatric: Pt has a normal mood and affect. Behavior is normal. Judgment and thought content normal.       Consults: PT/SW  FEN: Puree diet   DVT prophylaxis: ASA, Plavix, Early ambulation  Code Status: DNR/DNIA  Disposition: Stable vital signs. Patient return to baseline.  All  labs/images reviewed    Signed:  Destinee Castro PA-C  June 16, 2023 at 11:59 AM

## 2023-06-16 NOTE — DISCHARGE SUMMARY
[facility]  Hospitalist Discharge Summary      Date of Admission:  6/11/2023  Date of Discharge:  6/15/2023  4:47 PM  Discharging Provider: Carmen Driscoll MD  Discharge Service: Medicine Service, INDER TEAM 1    Discharge Diagnoses   #DKA likely triggered by high carbohydrate load meal in the setting of a history of T1DM, Resolved  #Altered Mental Status, now at baseline  #Seizures  #CVA  #CKD  #Hyperkalemia, resolved  #Pseudohyponatremia, resolved  #Hypothyroidism  #Dementia    Clinically Significant Risk Factors          Follow-ups Needed After Discharge   Follow-up Appointments     Follow Up ([facility])      Follow up with primary care provider, Bony Castaneda, within 7 days   for hospital follow- up.  CBC recommended in 1 week.    Follow up with endocrinology within 1-2 weeks  to evaluate medication   change. No follow up labs or test are needed.    Appointments on [place] and/or [facility] (with [facility] or [facility]   provider or service). Call [phone] if you haven't heard regarding   these appointments within 7 days of discharge.           Unresulted Labs Ordered in the Past 30 Days of this Admission       No orders found from 5/12/2023 to 6/12/2023.          Discharge Disposition   Discharged to home  Condition at discharge: Stable    Hospital Course     #DKA likely triggered by high carbohydrate load meal in the setting of a history of T1DM, Resolved  Multiple hospitalizations for DKA, most recent in May 2022. The patient's T1DM is described as Brittle, and glucose has been very labile in the past. The patient has been referred to endocrine in the past, however she appears to have missed at least two visits. On admission, her HCO3 10, pH 7.1, K+ 5.9. Endocrinology consulted. Treated with DKA protocol.   - Discharge insulin regimen per endocrinology:  - lantus 18 units daily  - Aspart 4 units with meals  - Aspart medium correction scale  -  Follow up with endocrinology     #Altered Mental Status, now at baseline  #Seizures  #CVA  The patient presents essentially aphasic, confused but alert. She has a past medical history significant for strokes, with recent CTA showing critical M1/M2 stenosis and most recent hospitalization in May 2023 at neurology service. The patient is essentially aphasic but with intact comprehension and able to execute all commands. CT head with no changes. Infectious workup completed and unremarkable.   - PTA Aspirin  - PTA Plavix (ordered as suspension)  - PTA Atorvastatin  - PTA Keppra (ordered as suspension)  - SLP consult placed  - Blood cultures NGTD  - UA unremarkable    #CKD  #Hyperkalemia, resolved  #Pseudohyponatremia, resolved  Baseline creatinine ~1.7. Likely hypertension/diabetic nephropathy. Creatinine elevation secondary to dehydration. Pseudohyponatremia in the setting of profound hyperglycemia. Hyperkalemia in the setting of DKA.  - Rehydrate  - Trend renal function  - Avoid nephrotoxic agents     #Hypothyroidism  - PTA levothyroxine (ordered as suspension)     #Dementia  -  2/2 Vascular vs Alzheimer's.  On her 2021 MRI, no signs suggestive of CVA.  She did have microhemorrhages on SWI, but all of these were deep.       Consultations This Hospital Stay   CNS DIABETES IP CONSULT  DIABETES EDUCATION IP CONSULT  SPEECH LANGUAGE PATH ADULT IP CONSULT  VASCULAR ACCESS FOR PICC PLACEMENT ADULT IP CONSULT  ENDOCRINE DIABETES ADULT IP CONSULT  CARE MANAGEMENT / SOCIAL WORK IP CONSULT  PHYSICAL THERAPY ADULT IP CONSULT  SOCIAL WORK IP CONSULT  OCCUPATIONAL THERAPY ADULT IP CONSULT    Code Status   Prior    Time Spent on this Encounter   I, Carmen Driscoll MD, personally saw the patient today and spent greater than 30 minutes discharging this patient.       Carmen Driscoll MD  [facility] [address] [address]  Phone:  [phone]  ______________________________________________________________________    Physical Exam   Vital Signs: Temp: 98  F (36.7  C) Temp src: Oral BP: 136/74 Pulse: 84   Resp: 16 SpO2: 100 % O2 Device: None (Room air)    Weight: 130 lbs 1.14 oz  General: Awake, alert, no acute distress  HEENT: NCAT  Cardiac: normal rate and regular rhythm, no murmurs  Respiratory: clear to ascultation bilaterally, no increased work of breathing  GI: Soft, non-tender, non-distended, BS+  Neuro: Alert and oriented to self and place but not time or situation.  Moving all extremities spontaneously, able to speak in short/simple phrases with baseline dysarthria   MSK: No lower extremity edema       Primary Care Physician   Bony Castaneda    Discharge Orders      CBC with platelets     Adult Endocrinology [facility] Referral      Home Care Referral      Reason for your hospital stay    Dear Isabell Matthwes    You were hospitalized at [facility] with DKA and treated with insulin.  Please change your insulin dosing as described under medication changes. You will need to follow up with the endocrinology (diabetes) team as outpatient.     We are suggesting the following medication changes:  Glargine 18 units daily  Aspart 4 units three times daily with meals  Aspart sliding scale dosing with meals    Please get the following tests done:  None    Please set up an appointment with:  Endocrinology for management of diabetes  PCP for post-hospitalization follow-up    Thank you for allowing me and my team the privilege of caring for you today. Please let us know if there is anything else we can do for you so that we can be sure you are leaving completely satisfied with your care experience.    Your hospital unit at the time of discharge is 7D so if you have any questions please call the hospital at [phone] and ask to talk to a nurse on 7D.    Take care!    Carmen Driscoll MD  Hospitalist     Activity     Your activity upon discharge: activity as tolerated     Resume Home Care Services     Follow Up ([facility])    Follow up with primary care provider, Bony Castaneda, within 7 days for hospital follow- up.  CBC recommended in 1 week.    Follow up with endocrinology within 1-2 weeks  to evaluate medication change. No follow up labs or test are needed.    Appointments on [place] and/or [facility] (with [facility] or [facility] provider or service). Call [phone] if you haven't heard regarding these appointments within 7 days of discharge.     Diet    Follow this diet upon discharge: Orders Placed This Encounter      Snacks/Supplements Adult: Magic Cup; With Meals      Snacks/Supplements Adult: Glucerna; With Meals      Combination Diet Regular Diet       Significant Results and Procedures      Latest Reference Range & Units 06/11/23 03:42   GLUCOSE BY METER POCT 70 - 99 mg/dL >600 (HH)   (HH): Data is critically high   Latest Reference Range & Units 06/18/23 17:24   GLUCOSE BY METER POCT 70 - 99 mg/dL 180 (H)   (H): Data is abnormally high     Latest Reference Range & Units 06/11/23 03:39   FIO2  21   Ph Venous 7.32 - 7.43  7.17 (LL)   PCO2 Venous 40 - 50 mm Hg 37 (L)   PO2 Venous 25 - 47 mm Hg 35   Bicarbonate Venous 21 - 28 mmol/L 14 (L)   Base Excess Venous -7.7 - 1.9 mmol/L -14.2 (L)   (LL): Data is critically low  (L): Data is abnormally low    Discharge Medications   Discharge Medication List as of 6/15/2023  4:11 PM        START taking these medications    Details   !! insulin aspart (NOVOLOG PEN) 100 UNIT/ML pen Inject 4 Units Subcutaneous 3 times daily (with meals), Disp-15 mL, R-0, Historical      !! insulin aspart (NOVOLOG PEN) 100 UNIT/ML pen Inject 1-6 Units Subcutaneous 3 times daily (with meals) Correction Scale - MEDIUM INSULIN RESISTANCE DOSING     Do Not give Correction Insulin if BG less than 140.  For  - 189 give 1 unit.  For  - 239 give 2 units.  For  - 289 give 3 u nits.  For BG  290 - 339 give 4 units.  For  - 399 give 5 units.  For BG greater than or equal to 400 give 6 units.  Check blood glucose 3 times daily with meals and administer based on blood glucose.  Notify provider if glucose greater than or equa l to 350 mg/dL after administration of correction dose.  If given at mealtime, administer within 30 minutes of start of meal., Disp-15 mL, Historical      ondansetron (ZOFRAN ODT) 4 MG ODT tab Take 1 tablet (4 mg) by mouth every 8 hours as needed for nausea, Disp-30 tablet, R-0, E-Prescribe      prochlorperazine (COMPAZINE) 10 MG tablet Take 1 tablet (10 mg) by mouth every 6 hours as needed for nausea or vomiting, Disp-30 tablet, R-0, E-Prescribe       !! - Potential duplicate medications found. Please discuss with provider.        CONTINUE these medications which have CHANGED    Details   insulin glargine (LANTUS PEN) 100 UNIT/ML pen Inject 18 Units Subcutaneous every 24 hours, Disp-15 mL, HistoricalIf Lantus is not covered by insurance, may substitute Basaglar or Semglee or other insulin glargine product per insurance preference at same dose and frequency.             CONTINUE these medications which have NOT CHANGED    Details   acetaminophen (TYLENOL) 500 MG tablet Take 500 mg by mouth every 6 hours as needed for mild pain, Historical      aspirin (ASA) 81 MG chewable tablet Take 1 tablet (81 mg) by mouth daily, Disp-90 tablet, R-3, Local Print      atorvastatin (LIPITOR) 40 MG tablet Take 1 tablet (40 mg) by mouth daily, Disp-90 tablet, R-3, E-Prescribe      blood glucose monitoring (NO BRAND SPECIFIED) meter device kit Use to test blood sugar 4 times daily.  Please provide glucose meter that is covered by insurance.Disp-1 kit, [id number]      carvedilol (COREG) 12.5 MG tablet Take 1 tablet (12.5 mg) by mouth 2 times daily (with meals), Disp-180 tablet, R-3, E-Prescribe      clopidogrel (PLAVIX) 75 MG tablet 1 tablet (75 mg) by Oral or NG Tube route daily, Disp-19  tablet, R-0, E-Prescribe      cyanocobalamin (VITAMIN B-12) 1000 MCG tablet Take 1 tablet (1,000 mcg) by mouth daily, Disp-100 tablet, R-3, E-Prescribe      diclofenac (VOLTAREN) 1 % topical gel Apply 2 g topically 4 times daily as needed for moderate pain, Disp-150 g, R-1, E-Prescribe      doxazosin (CARDURA) 1 MG tablet Take 1 tablet (1 mg) by mouth At Bedtime, Disp-90 tablet, R-3, E-Prescribe      DULoxetine (CYMBALTA) 20 MG capsule Take 1 capsule (20 mg) by mouth daily, Disp-90 capsule, R-3, E-Prescribe      gabapentin (NEURONTIN) 100 MG capsule Take 1 capsule (100 mg) by mouth At Bedtime, Disp-90 capsule, R-1, E-Prescribe      insulin pen needle (31G X 8 MM) 31G X 8 MM miscellaneous Use 4 pen needles daily or as directed.Disp-300 each, [id number]      levETIRAcetam (KEPPRA) 500 MG tablet Take 1 tablet (500 mg) by mouth 2 times daily, Disp-180 tablet, R-3, Local Print      loratadine (CLARITIN) 10 mg tablet [LORATADINE (CLARITIN) 10 MG TABLET] Take 10 mg by mouth daily., Historical      melatonin 10 mg Tab Take 10 mg by mouth nightly as needed, Historical      METHYLCELLULOSE, LAXATIVE, PO Take 1 Tablespoonful by mouth daily as needed, Historical      nystatin (MYCOSTATIN) 296500 UNIT/GM external cream Apply topically 2 times daily Until rash clearDisp-30 g, [id number]      SYNTHROID 75 MCG tablet Take 1 tablet (75 mcg) by mouth daily, Disp-90 tablet, R-3, RAMÍREZ, E-PrescribeDiscontinue previous dose      TRUE METRIX BLOOD GLUCOSE TEST test strip USE TO TEST BLOOD SUGAR 4 TO 5 TIMES DAILY OR AS DIRECTED, Disp-400 strip, R-3, E-Prescribe      Vitamin D3 (CHOLECALCIFEROL) 25 mcg (1000 units) tablet Take 1 tablet (25 mcg) by mouth daily, Disp-100 tablet, R-3, E-Prescribe      zinc oxide (DESITIN) 20 % external ointment Apply topically as needed for dry skin or irritationDisp-85 g, [id number]           STOP taking these medications       insulin lispro (HUMALOG KWIKPEN) 100 UNIT/ML (1 unit dial) KWIKPEN  Comments:   Reason for Stopping:         sulfamethoxazole-trimethoprim (BACTRIM DS) 800-160 MG tablet Comments:   Reason for Stopping:             Allergies   Allergies   Allergen Reactions    Seasonal Allergies

## 2023-06-16 NOTE — ED PROVIDER NOTES
"    [place] EMERGENCY DEPARTMENT ([facility])    6/16/23       ED PROVIDER NOTE      History     Chief Complaint   Patient presents with     Vomiting     The history is provided by the patient, medical records and a relative. The history is limited by the condition of the patient (Dementia).     Isabell Matthews is a 62 year old female with a past medical history of HTN, HLD, type I diabetes mellitus, CKD stage 3b, hyperglycemia, hyperthyroidism, dementia, seizures, and previous stroke.    Per chart review, patient was hospitalized at [facility] and was discharged (06/15/23) at around 5pm for DKA. Patients daughter called nurse triage and said that patient was vomiting when daughter got to hospital and continued to throw up in the car ride home. Patient was then recommended to be seen here at ED.     Pt came to in ED early Sunday - saw signs of DKA right away - ended up getting sick. She was discharged. During hospitalization Sunday-Thursday morning no throwing up. She started throwing up Thursday morning.     Breakfast didn't stay down and her lunch came up - was given zofran through IV - 30 mins later was eating then 30 mins later threw up again. Daughter picked her up at 5pm (06/15/2023) and during car ride home threw up again. Was given Prochlorperazine Maleate for nausea at 6pm. At 7pm threw up again - called nurse who called doctor who was told to bring her back.    Daughter stated that even with a bite or two her mother throws up half an hour later. She doesn't know if it's something internal. Patient says she feels \"fine\", a little bit of pain around abdominal area, daughter reported that the pt had complains about her left side stomach area before arrival to ED. Abdominal pain described as sharp but not consistent.     Pt is urinating okay. Was able to sip on water throughout the day.     (Daughter wants to know if she will be discharged by Saturday because she has an event Saturday Night to work and needs " to know ASAP)           Past Medical History  Past Medical History:   Diagnosis Date     Chronic pain      Coronary atherosclerosis 6/14/2016     Essential hypertension      Ex-cigarette smoker     quit 7/2018 over 35 years     Ex-cigarette smoker      Hyperlipidemia      Hypothyroid      Seizures (H)      Stroke (H)      Vascular dementia (H)      Past Surgical History:   Procedure Laterality Date     athroplasty hip Bilateral     2003, 2006     OTHER SURGICAL HISTORY      athroplasty hip     PICC DOUBLE LUMEN PLACEMENT Right 06/11/2023    right basilic 5 fr dl power picc 39 cm     STENT       amLODIPine (NORVASC) 5 MG tablet  cefpodoxime (VANTIN) 200 MG tablet  insulin glargine (LANTUS PEN) 100 UNIT/ML pen  ondansetron (ZOFRAN ODT) 4 MG ODT tab  Vitamin D3 (CHOLECALCIFEROL) 125 MCG (5000 UT) tablet  acetaminophen (TYLENOL) 500 MG tablet  aspirin (ASA) 81 MG chewable tablet  atorvastatin (LIPITOR) 40 MG tablet  blood glucose monitoring (NO BRAND SPECIFIED) meter device kit  carvedilol (COREG) 12.5 MG tablet  clopidogrel (PLAVIX) 75 MG tablet  cyanocobalamin (VITAMIN B-12) 1000 MCG tablet  diclofenac (VOLTAREN) 1 % topical gel  doxazosin (CARDURA) 1 MG tablet  DULoxetine (CYMBALTA) 20 MG capsule  gabapentin (NEURONTIN) 100 MG capsule  insulin aspart (NOVOLOG PEN) 100 UNIT/ML pen  insulin aspart (NOVOLOG PEN) 100 UNIT/ML pen  insulin pen needle (31G X 8 MM) 31G X 8 MM miscellaneous  levETIRAcetam (KEPPRA) 500 MG tablet  loratadine (CLARITIN) 10 mg tablet  melatonin 10 mg Tab  METHYLCELLULOSE, LAXATIVE, PO  nystatin (MYCOSTATIN) 835054 UNIT/GM external cream  ondansetron (ZOFRAN ODT) 4 MG ODT tab  prochlorperazine (COMPAZINE) 10 MG tablet  SYNTHROID 75 MCG tablet  TRUE METRIX BLOOD GLUCOSE TEST test strip  Vitamin D3 (CHOLECALCIFEROL) 25 mcg (1000 units) tablet  zinc oxide (DESITIN) 20 % external ointment      Allergies   Allergen Reactions     Seasonal Allergies      Family History  Family History   Problem Relation  Age of Onset     Acute Myocardial Infarction Father      Heart Disease Maternal Grandmother      Dementia Maternal Grandmother      Myocardial Infarction Father      Dementia Paternal Grandmother      Heart Disease Paternal Grandmother      Social History   Social History     Tobacco Use     Smoking status: Former     Packs/day: 0.50     Years: 35.00     Pack years: 17.50     Types: Cigarettes     Quit date: 2018     Years since quittin.9     Smokeless tobacco: Never   Substance Use Topics     Alcohol use: Not Currently     Drug use: Not Currently         A medically appropriate review of systems was performed with pertinent positives and negatives noted in the HPI, and all other systems negative.   Physical Exam   BP: (!) 166/103  Pulse: 82  Temp: 98.1  F (36.7  C)  Resp: 16  SpO2: 100 %      Physical Exam  Vitals and nursing note reviewed.   Constitutional:       General: She is not in acute distress.     Appearance: Normal appearance.      Comments: Appears chronically ill     HENT:      Head: Normocephalic.      Nose: Nose normal.   Eyes:      Pupils: Pupils are equal, round, and reactive to light.   Cardiovascular:      Rate and Rhythm: Normal rate and regular rhythm.   Pulmonary:      Effort: Pulmonary effort is normal.   Abdominal:      General: There is no distension.      Comments: Tender to palpation in the periumbilical area and left lower quadrant.  No guarding rebound or other signs of peritonitis.  Negative CVA tenderness.  No overlying skin changes.   Musculoskeletal:         General: No deformity. Normal range of motion.      Cervical back: Normal range of motion.   Skin:     General: Skin is warm.   Neurological:      Mental Status: She is alert and oriented to person, place, and time.   Psychiatric:         Mood and Affect: Mood normal.         ED Course     No results found for this or any previous visit (from the past 24 hour(s)).  Medications   0.9% sodium chloride BOLUS (0 mLs  Intravenous Stopped 6/16/23 0130)     Followed by   0.9% sodium chloride BOLUS (0 mLs Intravenous Stopped 6/16/23 0100)   ondansetron (ZOFRAN) injection 4 mg (4 mg Intravenous $Given 6/15/23 2358)   metoclopramide (REGLAN) injection 5 mg (5 mg Intravenous $Given 6/16/23 0449)   iopamidol (ISOVUE-370) solution 80 mL (80 mLs Intravenous $Given 6/16/23 0333)   sodium chloride (PF) 0.9% PF flush 70 mL (70 mLs Intravenous $Given 6/16/23 0334)   hydrALAZINE (APRESOLINE) injection 10 mg (10 mg Intravenous $Given 6/16/23 2118)   0.9% sodium chloride BOLUS (1,000 mLs Intravenous $New Bag 6/17/23 1057)   0.9% sodium chloride BOLUS (1,000 mLs Intravenous $New Bag 6/17/23 1948)   dextrose 50 % injection 12.5 mL (12.5 mLs Intravenous $Given 6/18/23 0355)                 Critical care was not performed.     Medical Decision Making  The patient's presentation was of moderate complexity (a chronic illness mild to moderate exacerbation, progression, or side effect of treatment).    The patient's evaluation involved:  review of external note(s) from 1 sources (reviewed ED note and d/c summary from recent hospitalization)  ordering and/or review of 3+ test(s) in this encounter (see separate area of note for details)  review of 2 test result(s) ordered prior to this encounter (prior cbc, cmp)    The patient's management necessitated high risk (a decision regarding hospitalization) and further care after sign-out to Patrick Hahn (see their note for further management).      Assessments & Plan (with Medical Decision Making)   Patient with recent admission for DKA, discharged earlier today, presents to the ED with persistent nausea vomiting inability to tolerate p.o.  Family reports earlier complaint of lower, left greater than right abdominal pain as well.    On arrival, patient has normal vital signs.  She appears chronically ill and is nauseated.  She did vomit in the ED.  Started IV fluids and antiemetics.    Labs reviewed.  Creatinine  is 1.44 with is slightly increased from baseline.  Magnesium is 2.5.  Glucose 236.  Sodium 135.  Bicarb 20.  No anion gap.  Does not appear that patient has recurrence of DKA.    Point-of-care VBG is reassuring.  pH is 7.35.    CBC without leukocytosis or significant anemia.    Patient given IV Zofran.  Continues to be nauseated.  Will give IV Reglan.    Plan for CT abdomen pelvis with IV contrast to rule out secondary causes of nausea vomiting/abdominal pain such as diverticulitis or appendicitis.  Urinalysis pending as well.    We will sign out to overnight provider with plan to follow-up on UA and CT scan.  Patient continues to be nauseated, will likely need admission to the observation service for fluids/antiemetics.  Signout given to the obs team.        I, Savanah De Jesus, am serving as a trained medical scribe to document services personally performed by Marcello [place] MD based on the provider's statements to me on June 16, 2023.  This document has been checked and approved by the attending provider.    I, Marcello [place] MD, was physically present and have reviewed and verified the accuracy of this note documented by Savanah De Jesus, medical scribe.      Marcello [place] MD  I have reviewed the findings, diagnosis, plan and need for follow up with the patient.    Discharge Medication List as of 6/18/2023  6:14 PM      START taking these medications    Details   amLODIPine (NORVASC) 5 MG tablet Take 1 tablet (5 mg) by mouth daily, Disp-30 tablet, R-0, E-Prescribe      cefpodoxime (VANTIN) 200 MG tablet Take 1 tablet (200 mg) by mouth 2 times daily for 5 days, Disp-10 tablet, R-0, E-Prescribe      !! ondansetron (ZOFRAN ODT) 4 MG ODT tab Take 1 tablet (4 mg) by mouth every 6 hours as needed for nausea or vomiting, Disp-20 tablet, R-0, E-Prescribe       !! - Potential duplicate medications found. Please discuss with provider.          Final diagnoses:   Nausea and vomiting, unspecified vomiting type    Cognitive decline       6/15/2023   MARCELLO MIRAMONTES, DO Miramontes, Marcello Ram,   06/19/23 1836

## 2023-06-17 ENCOUNTER — APPOINTMENT (OUTPATIENT)
Dept: PHYSICAL THERAPY | Facility: CLINIC | Age: 63
End: 2023-06-17
Attending: PHYSICIAN ASSISTANT
Payer: COMMERCIAL

## 2023-06-17 LAB
ALBUMIN SERPL BCG-MCNC: 3.1 G/DL (ref 3.5–5.2)
ALP SERPL-CCNC: 116 U/L (ref 35–104)
ALT SERPL W P-5'-P-CCNC: 23 U/L (ref 0–50)
ANION GAP SERPL CALCULATED.3IONS-SCNC: 14 MMOL/L (ref 7–15)
AST SERPL W P-5'-P-CCNC: 33 U/L (ref 0–45)
B-OH-BUTYR SERPL-SCNC: 0.2 MMOL/L
BASOPHILS # BLD AUTO: 0 10E3/UL (ref 0–0.2)
BASOPHILS NFR BLD AUTO: 1 %
BILIRUB SERPL-MCNC: 0.2 MG/DL
BUN SERPL-MCNC: 16.8 MG/DL (ref 8–23)
CALCIUM SERPL-MCNC: 8.7 MG/DL (ref 8.8–10.2)
CHLORIDE SERPL-SCNC: 104 MMOL/L (ref 98–107)
CREAT SERPL-MCNC: 1.33 MG/DL (ref 0.51–0.95)
DEPRECATED HCO3 PLAS-SCNC: 17 MMOL/L (ref 22–29)
EOSINOPHIL # BLD AUTO: 0.1 10E3/UL (ref 0–0.7)
EOSINOPHIL NFR BLD AUTO: 2 %
ERYTHROCYTE [DISTWIDTH] IN BLOOD BY AUTOMATED COUNT: 14.8 % (ref 10–15)
GFR SERPL CREATININE-BSD FRML MDRD: 44 ML/MIN/1.73M2
GLUCOSE BLDC GLUCOMTR-MCNC: 111 MG/DL (ref 70–99)
GLUCOSE BLDC GLUCOMTR-MCNC: 161 MG/DL (ref 70–99)
GLUCOSE BLDC GLUCOMTR-MCNC: 209 MG/DL (ref 70–99)
GLUCOSE BLDC GLUCOMTR-MCNC: 210 MG/DL (ref 70–99)
GLUCOSE BLDC GLUCOMTR-MCNC: 219 MG/DL (ref 70–99)
GLUCOSE BLDC GLUCOMTR-MCNC: 259 MG/DL (ref 70–99)
GLUCOSE BLDC GLUCOMTR-MCNC: 34 MG/DL (ref 70–99)
GLUCOSE BLDC GLUCOMTR-MCNC: 341 MG/DL (ref 70–99)
GLUCOSE BLDC GLUCOMTR-MCNC: 356 MG/DL (ref 70–99)
GLUCOSE BLDC GLUCOMTR-MCNC: 39 MG/DL (ref 70–99)
GLUCOSE SERPL-MCNC: 354 MG/DL (ref 70–99)
HCT VFR BLD AUTO: 33.2 % (ref 35–47)
HGB BLD-MCNC: 10.6 G/DL (ref 11.7–15.7)
IMM GRANULOCYTES # BLD: 0 10E3/UL
IMM GRANULOCYTES NFR BLD: 1 %
LACTATE SERPL-SCNC: 2 MMOL/L (ref 0.7–2)
LACTATE SERPL-SCNC: 2.7 MMOL/L (ref 0.7–2)
LACTATE SERPL-SCNC: 2.7 MMOL/L (ref 0.7–2)
LYMPHOCYTES # BLD AUTO: 1.7 10E3/UL (ref 0.8–5.3)
LYMPHOCYTES NFR BLD AUTO: 35 %
MAGNESIUM SERPL-MCNC: 2.6 MG/DL (ref 1.7–2.3)
MCH RBC QN AUTO: 29.5 PG (ref 26.5–33)
MCHC RBC AUTO-ENTMCNC: 31.9 G/DL (ref 31.5–36.5)
MCV RBC AUTO: 93 FL (ref 78–100)
MONOCYTES # BLD AUTO: 0.3 10E3/UL (ref 0–1.3)
MONOCYTES NFR BLD AUTO: 5 %
NEUTROPHILS # BLD AUTO: 2.8 10E3/UL (ref 1.6–8.3)
NEUTROPHILS NFR BLD AUTO: 56 %
NRBC # BLD AUTO: 0 10E3/UL
NRBC BLD AUTO-RTO: 0 /100
PHOSPHATE SERPL-MCNC: 3.6 MG/DL (ref 2.5–4.5)
PLATELET # BLD AUTO: 151 10E3/UL (ref 150–450)
POTASSIUM SERPL-SCNC: 4.3 MMOL/L (ref 3.4–5.3)
PROT SERPL-MCNC: 5.5 G/DL (ref 6.4–8.3)
RBC # BLD AUTO: 3.59 10E6/UL (ref 3.8–5.2)
SODIUM SERPL-SCNC: 135 MMOL/L (ref 136–145)
WBC # BLD AUTO: 5 10E3/UL (ref 4–11)

## 2023-06-17 PROCEDURE — 258N000001 HC RX 258: Performed by: PHYSICIAN ASSISTANT

## 2023-06-17 PROCEDURE — 82962 GLUCOSE BLOOD TEST: CPT

## 2023-06-17 PROCEDURE — 36415 COLL VENOUS BLD VENIPUNCTURE: CPT | Performed by: NURSE PRACTITIONER

## 2023-06-17 PROCEDURE — 80053 COMPREHEN METABOLIC PANEL: CPT | Performed by: PHYSICIAN ASSISTANT

## 2023-06-17 PROCEDURE — 258N000003 HC RX IP 258 OP 636: Performed by: NURSE PRACTITIONER

## 2023-06-17 PROCEDURE — 250N000013 HC RX MED GY IP 250 OP 250 PS 637: Performed by: PHYSICIAN ASSISTANT

## 2023-06-17 PROCEDURE — 83735 ASSAY OF MAGNESIUM: CPT | Performed by: PHYSICIAN ASSISTANT

## 2023-06-17 PROCEDURE — 85025 COMPLETE CBC W/AUTO DIFF WBC: CPT | Performed by: PHYSICIAN ASSISTANT

## 2023-06-17 PROCEDURE — 99222 1ST HOSP IP/OBS MODERATE 55: CPT | Mod: GC | Performed by: INTERNAL MEDICINE

## 2023-06-17 PROCEDURE — 97161 PT EVAL LOW COMPLEX 20 MIN: CPT | Mod: GP

## 2023-06-17 PROCEDURE — 84100 ASSAY OF PHOSPHORUS: CPT | Performed by: PHYSICIAN ASSISTANT

## 2023-06-17 PROCEDURE — 36415 COLL VENOUS BLD VENIPUNCTURE: CPT | Performed by: PHYSICIAN ASSISTANT

## 2023-06-17 PROCEDURE — 96375 TX/PRO/DX INJ NEW DRUG ADDON: CPT

## 2023-06-17 PROCEDURE — 250N000011 HC RX IP 250 OP 636: Performed by: NURSE PRACTITIONER

## 2023-06-17 PROCEDURE — 97530 THERAPEUTIC ACTIVITIES: CPT | Mod: GP

## 2023-06-17 PROCEDURE — 250N000011 HC RX IP 250 OP 636: Performed by: PHYSICIAN ASSISTANT

## 2023-06-17 PROCEDURE — 83605 ASSAY OF LACTIC ACID: CPT | Performed by: NURSE PRACTITIONER

## 2023-06-17 PROCEDURE — G0378 HOSPITAL OBSERVATION PER HR: HCPCS

## 2023-06-17 PROCEDURE — 258N000003 HC RX IP 258 OP 636: Performed by: EMERGENCY MEDICINE

## 2023-06-17 PROCEDURE — 250N000013 HC RX MED GY IP 250 OP 250 PS 637: Performed by: NURSE PRACTITIONER

## 2023-06-17 PROCEDURE — 83605 ASSAY OF LACTIC ACID: CPT | Performed by: PHYSICIAN ASSISTANT

## 2023-06-17 PROCEDURE — 999N000248 HC STATISTIC IV INSERT WITH US BY RN

## 2023-06-17 PROCEDURE — 96376 TX/PRO/DX INJ SAME DRUG ADON: CPT

## 2023-06-17 PROCEDURE — 82010 KETONE BODYS QUAN: CPT | Performed by: PHYSICIAN ASSISTANT

## 2023-06-17 PROCEDURE — 258N000003 HC RX IP 258 OP 636: Performed by: PHYSICIAN ASSISTANT

## 2023-06-17 RX ORDER — CARVEDILOL 25 MG/1
25 TABLET ORAL ONCE
Status: DISCONTINUED | OUTPATIENT
Start: 2023-06-17 | End: 2023-06-17

## 2023-06-17 RX ORDER — AMLODIPINE BESYLATE 5 MG/1
5 TABLET ORAL DAILY
Status: DISCONTINUED | OUTPATIENT
Start: 2023-06-17 | End: 2023-06-18 | Stop reason: HOSPADM

## 2023-06-17 RX ORDER — CEFTRIAXONE 1 G/1
1 INJECTION, POWDER, FOR SOLUTION INTRAMUSCULAR; INTRAVENOUS EVERY 24 HOURS
Status: DISCONTINUED | OUTPATIENT
Start: 2023-06-17 | End: 2023-06-18 | Stop reason: HOSPADM

## 2023-06-17 RX ORDER — HYDRALAZINE HYDROCHLORIDE 20 MG/ML
10 INJECTION INTRAMUSCULAR; INTRAVENOUS EVERY 6 HOURS PRN
Status: DISCONTINUED | OUTPATIENT
Start: 2023-06-17 | End: 2023-06-18 | Stop reason: HOSPADM

## 2023-06-17 RX ORDER — CARVEDILOL 12.5 MG/1
12.5 TABLET ORAL 2 TIMES DAILY
Status: DISCONTINUED | OUTPATIENT
Start: 2023-06-17 | End: 2023-06-18 | Stop reason: HOSPADM

## 2023-06-17 RX ORDER — HYDRALAZINE HYDROCHLORIDE 20 MG/ML
10 INJECTION INTRAMUSCULAR; INTRAVENOUS ONCE
Status: DISCONTINUED | OUTPATIENT
Start: 2023-06-17 | End: 2023-06-17

## 2023-06-17 RX ORDER — CARVEDILOL 12.5 MG/1
12.5 TABLET ORAL 2 TIMES DAILY
Status: DISCONTINUED | OUTPATIENT
Start: 2023-06-18 | End: 2023-06-17

## 2023-06-17 RX ADMIN — LEVETIRACETAM 500 MG: 500 TABLET, FILM COATED ORAL at 20:03

## 2023-06-17 RX ADMIN — CLOPIDOGREL BISULFATE 75 MG: 75 TABLET ORAL at 09:29

## 2023-06-17 RX ADMIN — LEVETIRACETAM 500 MG: 500 TABLET, FILM COATED ORAL at 09:29

## 2023-06-17 RX ADMIN — INSULIN ASPART 4 UNITS: 100 INJECTION, SOLUTION INTRAVENOUS; SUBCUTANEOUS at 12:40

## 2023-06-17 RX ADMIN — SODIUM CHLORIDE 1000 ML: 9 INJECTION, SOLUTION INTRAVENOUS at 19:48

## 2023-06-17 RX ADMIN — SODIUM CHLORIDE 1000 ML: 9 INJECTION, SOLUTION INTRAVENOUS at 10:57

## 2023-06-17 RX ADMIN — CARVEDILOL 12.5 MG: 12.5 TABLET, FILM COATED ORAL at 20:03

## 2023-06-17 RX ADMIN — ATORVASTATIN CALCIUM 40 MG: 40 TABLET, FILM COATED ORAL at 20:03

## 2023-06-17 RX ADMIN — SODIUM CHLORIDE: 9 INJECTION, SOLUTION INTRAVENOUS at 16:31

## 2023-06-17 RX ADMIN — DOXAZOSIN 1 MG: 1 TABLET ORAL at 22:43

## 2023-06-17 RX ADMIN — CEFTRIAXONE SODIUM 1 G: 1 INJECTION, POWDER, FOR SOLUTION INTRAMUSCULAR; INTRAVENOUS at 09:30

## 2023-06-17 RX ADMIN — AMLODIPINE BESYLATE 5 MG: 5 TABLET ORAL at 17:36

## 2023-06-17 RX ADMIN — DULOXETINE HYDROCHLORIDE 20 MG: 20 CAPSULE, DELAYED RELEASE ORAL at 09:42

## 2023-06-17 RX ADMIN — CARVEDILOL 12.5 MG: 12.5 TABLET, FILM COATED ORAL at 09:39

## 2023-06-17 RX ADMIN — DEXTROSE MONOHYDRATE 25 ML: 25 INJECTION, SOLUTION INTRAVENOUS at 02:07

## 2023-06-17 RX ADMIN — LEVOTHYROXINE SODIUM 75 MCG: 0.03 TABLET ORAL at 09:29

## 2023-06-17 RX ADMIN — GABAPENTIN 100 MG: 100 CAPSULE ORAL at 22:43

## 2023-06-17 RX ADMIN — HYDRALAZINE HYDROCHLORIDE 10 MG: 20 INJECTION INTRAMUSCULAR; INTRAVENOUS at 16:32

## 2023-06-17 RX ADMIN — ACETAMINOPHEN 500 MG: 500 TABLET ORAL at 00:56

## 2023-06-17 RX ADMIN — INSULIN ASPART 4 UNITS: 100 INJECTION, SOLUTION INTRAVENOUS; SUBCUTANEOUS at 09:48

## 2023-06-17 RX ADMIN — ASPIRIN 81 MG CHEWABLE TABLET 81 MG: 81 TABLET CHEWABLE at 09:29

## 2023-06-17 RX ADMIN — INSULIN GLARGINE 18 UNITS: 100 INJECTION, SOLUTION SUBCUTANEOUS at 12:41

## 2023-06-17 ASSESSMENT — ACTIVITIES OF DAILY LIVING (ADL)
ADLS_ACUITY_SCORE: 35
ADLS_ACUITY_SCORE: 35
ADLS_ACUITY_SCORE: 33
ADLS_ACUITY_SCORE: 35
ADLS_ACUITY_SCORE: 33
ADLS_ACUITY_SCORE: 35
ADLS_ACUITY_SCORE: 33

## 2023-06-17 NOTE — PROGRESS NOTES
Observation goals  -diagnostic tests and consults completed and resulted - Pending  -vital signs normal or at patient baseline -Not met, BP elevated.   -tolerating oral intake to maintain hydration- Not met patient is slow in swallowing.  -adequate pain control on oral analgesics Tylenol given. Patient later denied pain.  -returns to baseline functional status -Not met  -safe disposition plan has been identified -Not met

## 2023-06-17 NOTE — CONSULTS
Inpatient Diabetes consult service (IDS)    Assessment/plan  1.  DM type I: Poorly controlled hemoglobin A1c 10.8 on 5/20/2023:    Recommendations:  -To reduce Lantus dose to 14 units.  -To discontinue NovoLog fixed coverage for the meals and to start on carb coverage of 1: 20 g CHO with meals and snacks.  -Continue medium dose SSI 3 times daily before meals and at the bedtime.  -POC BG check 3 times daily AC at the bedtime and 2 AM.    Discharge plan:  TBD we will continue to assess the insulin requirements during the admission with improving oral intake.          Chief complaint: Nausea and vomiting with generalized weakness    HPI  Isabell Matthews is a 62 year old female with background history of DM type I, CKD stage III, hypothyroidism, dementia, seizure and CVA was discharged from the hospital on 6/15 following an admission with the DKA.  Readmitted to the hospital on 6/16 with persistent nausea vomiting and generalized weakness.      Was seen by the diabetes service on the last admission, was discharged from the hospital on 6/14/2023 had DKA on the presentation.  Was discharged on Lantus 18 units, aspart 4 units with meals, aspart medium dose correction scale.    On the readmission no evidence of DKA.  Following admission was restarted on Lantus 18 units, medium dose SSI and NovoLog 4 units 3 times daily AC.    Total daily insulin:  6/16 NovoLog 10 units, Lantus 18 units    Ordered DM relevant regimen  Lantus 18 units, medium dose SSI, NovoLog 4 units 3 times daily AC    Active Diet Order  Orders Placed This Encounter      Pureed Diet (level 4) Thin Liquids (level 0)    Add tube feeding or TPN , none    Recent Labs   Lab 06/17/23  0813 06/17/23  0655 06/17/23  0557 06/17/23  0243 06/17/23  0219 06/17/23  0206   * 354* 259* 111* 161* 34*         Past DM history:   Diabetes type: DM type 1  Diabetes Duration:  age of 38 y  Usual Diabetes Regimen:  Lantus 18 units, NovoLog 4 units 3 times daily AC, medium  dose SSI  Ability to [place] Prescribed Regimen:  No  Diabetes Control:  Poorly controlled hemoglobin A1c 10.8.  Diabetes Complications: retinopathy - +  Peripheral neuropathy +  Nephropathy -   History of DKA:  Has history of DKA  Able to Detect Hypoglycemia:   Usual Diabetes Care Provider:  Carolina Esteves however no show for the last 2 visits last seen in 2020 by Shira.  Factors Impacting Glucose Control:  Has advanced dementia not able to manage her diabetes without help.    Exam  BP (!) 168/70   Pulse 100   Temp 97.9  F (36.6  C) (Oral)   Resp 16   SpO2 99%   GENERAL: no distress  SKIN: Visible skin clear. No significant rash, abnormal pigmentation or lesions.  EYES: Eyes grossly normal to inspection.  No discharge or erythema, or obvious scleral/conjunctival abnormalities.  NECK: no visible masses; no grossly visible goiter  RESP: No audible wheeze, cough, or visible cyanosis.  No visible retractions or increased work of breathing.    NEURO: Cranial nerves grossly intact.  Awake, alert, responds appropriately to questions.  Mentation and speech fluent.  PSYCH:affect normal, judgement and insight intact, and appearance well-groomed.      DATA    Latest Reference Range & Units 06/17/23 06:55   Sodium 136 - 145 mmol/L 135 (L)   Potassium 3.4 - 5.3 mmol/L 4.3   Chloride 98 - 107 mmol/L 104   Carbon Dioxide (CO2) 22 - 29 mmol/L 17 (L)   Urea Nitrogen 8.0 - 23.0 mg/dL 16.8   Creatinine 0.51 - 0.95 mg/dL 1.33 (H)   GFR Estimate >60 mL/min/1.73m2 44 (L)   Calcium 8.8 - 10.2 mg/dL 8.7 (L)   Anion Gap 7 - 15 mmol/L 14   Magnesium 1.7 - 2.3 mg/dL 2.6 (H)   Phosphorus 2.5 - 4.5 mg/dL 3.6   Albumin 3.5 - 5.2 g/dL 3.1 (L)   Protein Total 6.4 - 8.3 g/dL 5.5 (L)   Alkaline Phosphatase 35 - 104 U/L 116 (H)   ALT 0 - 50 U/L 23   AST 0 - 45 U/L 33   (L): Data is abnormally low  (H): Data is abnormally high    [facility]     Endocrinology diabetes and metabolism  fellow   Pager number: [phone]       I have seen and  examined the patient, reviewed records, reviewed and edited the fellow's note.  I agree with the plan of care.    Sarbjit Ward MD   Division of Diabetes, Endocrinology and Metabolism  Department of Medicine

## 2023-06-17 NOTE — PROGRESS NOTES
[facility] ED Observation Progress Note    Chief Complaint   Patient presents with     Vomiting       Assessment/Plan:  Isabell Matthews is a 62 year old female with a past medical history of HTN, HLD, type I diabetes mellitus, CKD stage 3b, hyperglycemia, hyperthyroidism, dementia, seizures, and previous stroke.     #T1DM  #Generalized weakness  #Persistent nausea and vomiting  # UTI   Patient is a poor historian, family unreachable. History mainly provided by medical records. Per chart review, patient was recently hospitalized for DKA and was discharged yesterday 6/15 at around 5pm. Per ED note, patient's daughter reported that patient continued to have episodes of emesis after leaving the hospital. As result, she called triage and was instructed to return the patient to the ER. Patient on insulin, but it is unclear if she is complaint given her cognitive decline 2/2 vascular vs alzheimer's. Hx of multiple hospitalizations for DKA. She was seen by endocrine in this last admission and was discharged on lantus 18 units q 24hr, aspart 4 units TID with meals, and aspart medium correction scale. Upon arrival in the ED, she was noted to be nauseous. She was given IV antiemetics and IVF with mild improvement. No signs of DKA with AG serum ketones being wnl. Alk phos mildly elevated at 147, AST and ALT, and lipase wnl. No indication of infection process, lactic acid wnl, no leukocytosis. HGB stable at 12.4. VBG edwin, no signs of acidosis. Covid19/influnzea  A/B negative. CT abdomen, a very small amount of nonspecific free fluid in the pelvis. No other convincing cause of acute pain identified in the abdomen or pelvis. No evidence of bowel obstruction. Colonic diverticulosis without evidence of diverticulitis. Patient is admitted to ed observation unit for n/v management and PT assessment. Patient was able to tolerate breakfast this morning. Endocrinology consulted and recommended the following:To reduce Lantus dose to 14 units.To  discontinue NovoLog fixed coverage for the meals and to start on carb coverage of 1: 20 g CHO with meals and snacks.Continue medium dose SSI 3 times daily before meals and at the bedtime. POC BG check 3 times daily AC at the bedtime and 2 AM.  . PT assessed patient and recommended home with home care,  UA with negative ketones, moderate LE, WBC 43. UC pending.   - VS per routine  -  ml/hr   - BG checks TID ac and Qhs  - Carbohydrate Consistent Diet  - Hypoglycemic protocol  - Antiemetics: Zofran, compazine prn    # Elevated Lactic Acidosis - IV 1 liter bolus x 1.   - Continuous IVF  Repeat lactic acid after IV bolus     #Seizures  #CVA  She has a past medical history significant for strokes, with recent CTA showing critical M1/M2 stenosis and most recent hospitalization in May 2023 at neurology service. She does not complain of headache today. She does have delayed speech, but that was noted in previous admission as well. The patient is essentially aphasic but with intact comprehension and able to follow command. Patient noted to be pocketing bills and holding food in her mouth. She did have SLP evaluation Recommend the pt continue her baseline diet of puree (IDDSI 4) and thin liquids with 1:1 assistance. Ensure the pt is upright for all PO. Check oral cavity for pocketing and cue the pt to swallow. Keppra level 7.8. Discussed with neurology Unsure if patient is taking medication consistently. Will continue home dosing. Recheck Keppra level at next neurology appointment. (will need this set up) Referral sent)    - PTA Aspirin 81 mg daily  - PTA Plavix 75 mg daily   - PTA Keppra 500 mg BID   - PTA Atorvastatin 40 mg daily        #HTN - BP still elevated at 192/93. Received IV Hydralazine overnight.   - IV Hydralazine prn.   - Add Norvasc 5 mg daily  - PTA Carvedilol 12.5 mg BID   - PTA Doxazosin 1 mg HS      #CKD  Cr 1.33. Baseline creatinine ~1.7.   - Rehydrate  - Trend renal function  - Avoid  nephrotoxic agents    #Hypothyroidism  - PTA levothyroxin 75 mg daily      #Dementia   2/2 Vascular vs Alzheimer's.  On her  MRI, no signs suggestive of CVA.  She did have microhemorrhages on SWI, but all of these were deep.     #Deprssion:  - PTA Duloxetine 20 mg daily       Past Medical History:   Diagnosis Date     Chronic pain      Coronary atherosclerosis 2016     Essential hypertension      Ex-cigarette smoker     quit 2018 over 35 years     Ex-cigarette smoker      Hyperlipidemia      Hypothyroid      Seizures (H)      Stroke (H)      Vascular dementia (H)        Past Surgical History:   Procedure Laterality Date     athroplasty hip Bilateral     ,      OTHER SURGICAL HISTORY      athroplasty hip     PICC DOUBLE LUMEN PLACEMENT Right 2023    right basilic 5 fr dl power picc 39 cm     STENT         Family History   Problem Relation Age of Onset     Acute Myocardial Infarction Father      Heart Disease Maternal Grandmother      Dementia Maternal Grandmother      Myocardial Infarction Father      Dementia Paternal Grandmother      Heart Disease Paternal Grandmother        Social History     Socioeconomic History     Marital status:      Spouse name: Not on file     Number of children: Not on file     Years of education: Not on file     Highest education level: Not on file   Occupational History     Not on file   Tobacco Use     Smoking status: Former     Packs/day: 0.50     Years: 35.00     Pack years: 17.50     Types: Cigarettes     Quit date: 2018     Years since quittin.9     Smokeless tobacco: Never   Vaping Use     Vaping status: Not on file   Substance and Sexual Activity     Alcohol use: Not Currently     Drug use: Not Currently     Sexual activity: Not Currently   Other Topics Concern     Parent/sibling w/ CABG, MI or angioplasty before 65F 55M? Not Asked   Social History Narrative    ** Merged History Encounter **         Recently moved to [facility] with Daughter  Charli who is her pca     Social Determinants of Health     Financial Resource Strain: Not on file   Food Insecurity: Not on file   Transportation Needs: Not on file   Physical Activity: Not on file   Stress: Not on file   Social Connections: Not on file   Intimate Partner Violence: Not on file   Housing Stability: Not on file       No current facility-administered medications on file prior to encounter.  Vitamin D3 (CHOLECALCIFEROL) 125 MCG (5000 UT) tablet, Take 125 mcg by mouth daily  acetaminophen (TYLENOL) 500 MG tablet, Take 500 mg by mouth every 6 hours as needed for mild pain  aspirin (ASA) 81 MG chewable tablet, Take 1 tablet (81 mg) by mouth daily  atorvastatin (LIPITOR) 40 MG tablet, Take 1 tablet (40 mg) by mouth daily  blood glucose monitoring (NO BRAND SPECIFIED) meter device kit, Use to test blood sugar 4 times daily.  Please provide glucose meter that is covered by insurance.  carvedilol (COREG) 12.5 MG tablet, Take 1 tablet (12.5 mg) by mouth 2 times daily (with meals)  clopidogrel (PLAVIX) 75 MG tablet, 1 tablet (75 mg) by Oral or NG Tube route daily  cyanocobalamin (VITAMIN B-12) 1000 MCG tablet, Take 1 tablet (1,000 mcg) by mouth daily  diclofenac (VOLTAREN) 1 % topical gel, Apply 2 g topically 4 times daily as needed for moderate pain  doxazosin (CARDURA) 1 MG tablet, Take 1 tablet (1 mg) by mouth At Bedtime  DULoxetine (CYMBALTA) 20 MG capsule, Take 1 capsule (20 mg) by mouth daily  gabapentin (NEURONTIN) 100 MG capsule, Take 1 capsule (100 mg) by mouth At Bedtime  insulin aspart (NOVOLOG PEN) 100 UNIT/ML pen, Inject 4 Units Subcutaneous 3 times daily (with meals)  insulin aspart (NOVOLOG PEN) 100 UNIT/ML pen, Inject 1-6 Units Subcutaneous 3 times daily (with meals) Correction Scale - MEDIUM INSULIN RESISTANCE DOSING     Do Not give Correction Insulin if BG less than 140.  For  - 189 give 1 unit.  For  - 239 give 2 units.  For  - 289 give 3 units.  For  - 339 give 4  units.  For  - 399 give 5 units.  For BG greater than or equal to 400 give 6 units.  Check blood glucose 3 times daily with meals and administer based on blood glucose.  Notify provider if glucose greater than or equal to 350 mg/dL after administration of correction dose.  If given at mealtime, administer within 30 minutes of start of meal.  insulin glargine (LANTUS PEN) 100 UNIT/ML pen, Inject 22 Units Subcutaneous every 24 hours  insulin pen needle (31G X 8 MM) 31G X 8 MM miscellaneous, Use 4 pen needles daily or as directed.  levETIRAcetam (KEPPRA) 500 MG tablet, Take 1 tablet (500 mg) by mouth 2 times daily (Patient not taking: Reported on 6/16/2023)  loratadine (CLARITIN) 10 mg tablet, [LORATADINE (CLARITIN) 10 MG TABLET] Take 10 mg by mouth daily.  melatonin 10 mg Tab, Take 10 mg by mouth nightly as needed  METHYLCELLULOSE, LAXATIVE, PO, Take 1 Tablespoonful by mouth daily as needed  nystatin (MYCOSTATIN) 680711 UNIT/GM external cream, Apply topically 2 times daily Until rash clear  ondansetron (ZOFRAN ODT) 4 MG ODT tab, Take 1 tablet (4 mg) by mouth every 8 hours as needed for nausea  prochlorperazine (COMPAZINE) 10 MG tablet, Take 1 tablet (10 mg) by mouth every 6 hours as needed for nausea or vomiting  SYNTHROID 75 MCG tablet, Take 1 tablet (75 mcg) by mouth daily  TRUE METRIX BLOOD GLUCOSE TEST test strip, USE TO TEST BLOOD SUGAR 4 TO 5 TIMES DAILY OR AS DIRECTED  Vitamin D3 (CHOLECALCIFEROL) 25 mcg (1000 units) tablet, Take 1 tablet (25 mcg) by mouth daily (Patient not taking: Reported on 6/16/2023)  zinc oxide (DESITIN) 20 % external ointment, Apply topically as needed for dry skin or irritation (Patient not taking: Reported on 6/16/2023)        Data:    Results for orders placed or performed during the hospital encounter of 06/16/23   CT Abdomen Pelvis w Contrast     Status: None    Narrative    EXAM: CT ABDOMEN AND PELVIS WITH CONTRAST  LOCATION: [facility]  CENTER  DATE/TIME: 6/16/2023 3:47 AM CDT    INDICATION: Left lower quadrant pain. Persistent nausea and vomiting.  COMPARISON: 04/10/2022 - CT chest, abdomen and pelvis.    TECHNIQUE: CT scan of the abdomen and pelvis was performed following injection of IV contrast. Multiplanar reformats were obtained. Dose reduction techniques were used.  CONTRAST: 80 mL Isovue 370.    FINDINGS:    LOWER CHEST: A trace amount of bilateral pleural fluid. Coronary artery calcification.    HEPATOBILIARY: Unremarkable.    SPLEEN: Unremarkable.    PANCREAS: Unremarkable.    ADRENAL GLANDS: Unremarkable.    KIDNEYS/BLADDER: Moderate multifocal bilateral renal atrophy. A 3.0 x 2.0 cm mildly heterogeneous low-attenuation region within the central aspect of the upper pole of the left kidney (series 5 image 126). This is nonspecific and not well characterized   on this study, but has decreased in size since the comparison study dated 04/10/2022 on which it measured 3.6 x 3.2 cm.    BOWEL: No dilatation of the small or large bowel. A few colonic diverticula are present, without evidence of diverticulitis. The appendix is not visualized. No visualized bowel wall thickening, pneumatosis or free intraperitoneal gas.    LYMPH NODES: Unremarkable.    PELVIC ORGANS: 5.0 x 3.2 cm relatively homogeneous intermediate attenuation ovoid structure in the right hemipelvis, abutting the uterus (series 4 image 307). On the comparison study dated 04/10/2022, this appeared to be an exophytic uterine fibroid.    MUSCULOSKELETAL: Partial visualization of bilateral hip arthroplasties.    OTHER: A very small amount of free fluid in the pelvis.      Impression    IMPRESSION:   1.  A very small amount of nonspecific free fluid in the pelvis.  2.  No other convincing cause of acute pain identified in the abdomen or pelvis. No evidence of bowel obstruction.  3.  Colonic diverticulosis without evidence of diverticulitis.                 Ketone Beta-Hydroxybutyrate  Quantitative     Status: Normal   Result Value Ref Range    Ketone (Beta-Hydroxybutyrate) Quantitative 0.30 <=0.30 mmol/L   Blood gas venous     Status: Abnormal   Result Value Ref Range    pH Venous 7.35 7.32 - 7.43    pCO2 Venous 49 40 - 50 mm Hg    pO2 Venous 21 (L) 25 - 47 mm Hg    Bicarbonate Venous 27 21 - 28 mmol/L    Base Excess/Deficit (+/-) 0.5 -7.7 - 1.9 mmol/L    FIO2 21    Comprehensive metabolic panel     Status: Abnormal   Result Value Ref Range    Sodium 135 (L) 136 - 145 mmol/L    Potassium 4.2 3.4 - 5.3 mmol/L    Chloride 102 98 - 107 mmol/L    Carbon Dioxide (CO2) 20 (L) 22 - 29 mmol/L    Anion Gap 13 7 - 15 mmol/L    Urea Nitrogen 17.3 8.0 - 23.0 mg/dL    Creatinine 1.44 (H) 0.51 - 0.95 mg/dL    Calcium 9.1 8.8 - 10.2 mg/dL    Glucose 236 (H) 70 - 99 mg/dL    Alkaline Phosphatase 147 (H) 35 - 104 U/L    AST 37 0 - 45 U/L    ALT 35 0 - 50 U/L    Protein Total 6.4 6.4 - 8.3 g/dL    Albumin 3.7 3.5 - 5.2 g/dL    Bilirubin Total 0.3 <=1.2 mg/dL    GFR Estimate 40 (L) >60 mL/min/1.73m2   Lactic acid whole blood     Status: Normal   Result Value Ref Range    Lactic Acid 1.6 0.7 - 2.0 mmol/L   Magnesium     Status: Abnormal   Result Value Ref Range    Magnesium 2.5 (H) 1.7 - 2.3 mg/dL   CBC with platelets and differential     Status: None   Result Value Ref Range    WBC Count 5.0 4.0 - 11.0 10e3/uL    RBC Count 4.19 3.80 - 5.20 10e6/uL    Hemoglobin 12.4 11.7 - 15.7 g/dL    Hematocrit 39.2 35.0 - 47.0 %    MCV 94 78 - 100 fL    MCH 29.6 26.5 - 33.0 pg    MCHC 31.6 31.5 - 36.5 g/dL    RDW 14.6 10.0 - 15.0 %    Platelet Count 167 150 - 450 10e3/uL    % Neutrophils 59 %    % Lymphocytes 34 %    % Monocytes 5 %    % Eosinophils 2 %    % Basophils 0 %    % Immature Granulocytes 0 %    NRBCs per 100 WBC 0 <1 /100    Absolute Neutrophils 3.0 1.6 - 8.3 10e3/uL    Absolute Lymphocytes 1.7 0.8 - 5.3 10e3/uL    Absolute Monocytes 0.3 0.0 - 1.3 10e3/uL    Absolute Eosinophils 0.1 0.0 - 0.7 10e3/uL    Absolute  Basophils 0.0 0.0 - 0.2 10e3/uL    Absolute Immature Granulocytes 0.0 <=0.4 10e3/uL    Absolute NRBCs 0.0 10e3/uL   Extra Tube     Status: None    Narrative    The following orders were created for panel order Extra Tube.  Procedure                               Abnormality         Status                     ---------                               -----------         ------                     Extra Blue Top Tube[[id number]]                              Final result               Extra Red Top Tube[[id number]]                               Final result                 Please view results for these tests on the individual orders.   Extra Blue Top Tube     Status: None   Result Value Ref Range    Hold Specimen JIC    Extra Red Top Tube     Status: None   Result Value Ref Range    Hold Specimen JIC    Glucose by meter     Status: Abnormal   Result Value Ref Range    GLUCOSE BY METER POCT 214 (H) 70 - 99 mg/dL   Lipase     Status: Normal   Result Value Ref Range    Lipase 27 13 - 60 U/L   Asymptomatic Influenza A/B, RSV, & SARS-CoV2 PCR (COVID-19) Nose     Status: Normal    Specimen: Nose; Swab   Result Value Ref Range    Influenza A PCR Negative Negative    Influenza B PCR Negative Negative    RSV PCR Negative Negative    SARS CoV2 PCR Negative Negative    Narrative    Testing was performed using the Xpert Xpress CoV2/Flu/RSV Assay on the Micro[employer]q GeneXpert Instrument. This test should be ordered for the detection of SARS-CoV-2, influenza, and RSV viruses in individuals who meet clinical and/or epidemiological criteria. Test performance is unknown in asymptomatic patients. This test is for in vitro diagnostic use under the FDA EUA for laboratories certified under CLIA to perform high or moderate complexity testing. This test has not been FDA cleared or approved. A negative result does not rule out the presence of PCR inhibitors in the specimen or target RNA in concentration below the limit of detection for the assay. If  only one viral target is positive but coinfection with multiple targets is suspected, the sample should be re-tested with another FDA cleared, approved, or authorized test, if coinfection would change clinical management. This test was validated by the [facility]. These laboratories are certified under the Clinical Laboratory Improvement Amendments of 1988 (CLIA-88) as qualified to perform high complexity laboratory testing.   UA with Microscopic reflex to Culture     Status: Abnormal    Specimen: Urine, NOS   Result Value Ref Range    Color Urine Light Yellow Colorless, Straw, Light Yellow, Yellow    Appearance Urine Slightly Cloudy (A) Clear    Glucose Urine Negative Negative mg/dL    Bilirubin Urine Negative Negative    Ketones Urine Negative Negative mg/dL    Specific Gravity Urine 1.014 1.003 - 1.035    Blood Urine Negative Negative    pH Urine 5.5 5.0 - 7.0    Protein Albumin Urine Negative Negative mg/dL    Urobilinogen Urine Normal Normal, 2.0 mg/dL    Nitrite Urine Negative Negative    Leukocyte Esterase Urine Moderate (A) Negative    Bacteria Urine Few (A) None Seen /HPF    Mucus Urine Present (A) None Seen /LPF    RBC Urine 0 <=2 /HPF    WBC Urine 43 (H) <=5 /HPF    Squamous Epithelials Urine 2 (H) <=1 /HPF    Narrative    Urine Culture ordered based on laboratory criteria   CBC with platelets     Status: Abnormal   Result Value Ref Range    WBC Count 4.9 4.0 - 11.0 10e3/uL    RBC Count 3.37 (L) 3.80 - 5.20 10e6/uL    Hemoglobin 10.0 (L) 11.7 - 15.7 g/dL    Hematocrit 31.4 (L) 35.0 - 47.0 %    MCV 93 78 - 100 fL    MCH 29.7 26.5 - 33.0 pg    MCHC 31.8 31.5 - 36.5 g/dL    RDW 14.7 10.0 - 15.0 %    Platelet Count 138 (L) 150 - 450 10e3/uL   Comprehensive metabolic panel     Status: Abnormal   Result Value Ref Range    Sodium 141 136 - 145 mmol/L    Potassium 3.8 3.4 - 5.3 mmol/L    Chloride 109 (H) 98 - 107 mmol/L    Carbon Dioxide (CO2) 21 (L) 22 - 29 mmol/L    Anion Gap 11 7 - 15  mmol/L    Urea Nitrogen 16.4 8.0 - 23.0 mg/dL    Creatinine 1.42 (H) 0.51 - 0.95 mg/dL    Calcium 8.3 (L) 8.8 - 10.2 mg/dL    Glucose 241 (H) 70 - 99 mg/dL    Alkaline Phosphatase 129 (H) 35 - 104 U/L    AST 29 0 - 45 U/L    ALT 31 0 - 50 U/L    Protein Total 5.8 (L) 6.4 - 8.3 g/dL    Albumin 3.3 (L) 3.5 - 5.2 g/dL    Bilirubin Total 0.2 <=1.2 mg/dL    GFR Estimate 41 (L) >60 mL/min/1.73m2   Glucose by meter     Status: Abnormal   Result Value Ref Range    GLUCOSE BY METER POCT 173 (H) 70 - 99 mg/dL   Glucose by meter     Status: Abnormal   Result Value Ref Range    GLUCOSE BY METER POCT 172 (H) 70 - 99 mg/dL   Keppra (Levetiracetam) Level     Status: Abnormal   Result Value Ref Range    Keppra (Levetiracetam) Level 7.8 (L) 10.0 - 40.0  g/mL   Glucose by meter     Status: Abnormal   Result Value Ref Range    GLUCOSE BY METER POCT 61 (L) 70 - 99 mg/dL   Glucose by meter     Status: Abnormal   Result Value Ref Range    GLUCOSE BY METER POCT 151 (H) 70 - 99 mg/dL   Glucose by meter     Status: Abnormal   Result Value Ref Range    GLUCOSE BY METER POCT 102 (H) 70 - 99 mg/dL   Comprehensive metabolic panel     Status: Abnormal   Result Value Ref Range    Sodium 135 (L) 136 - 145 mmol/L    Potassium 4.3 3.4 - 5.3 mmol/L    Chloride 104 98 - 107 mmol/L    Carbon Dioxide (CO2) 17 (L) 22 - 29 mmol/L    Anion Gap 14 7 - 15 mmol/L    Urea Nitrogen 16.8 8.0 - 23.0 mg/dL    Creatinine 1.33 (H) 0.51 - 0.95 mg/dL    Calcium 8.7 (L) 8.8 - 10.2 mg/dL    Glucose 354 (H) 70 - 99 mg/dL    Alkaline Phosphatase 116 (H) 35 - 104 U/L    AST 33 0 - 45 U/L    ALT 23 0 - 50 U/L    Protein Total 5.5 (L) 6.4 - 8.3 g/dL    Albumin 3.1 (L) 3.5 - 5.2 g/dL    Bilirubin Total 0.2 <=1.2 mg/dL    GFR Estimate 44 (L) >60 mL/min/1.73m2   Ketone Beta-Hydroxybutyrate Quantitative     Status: Normal   Result Value Ref Range    Ketone (Beta-Hydroxybutyrate) Quantitative 0.20 <=0.30 mmol/L   Lactic acid whole blood     Status: Abnormal   Result Value Ref  Range    Lactic Acid 2.7 (H) 0.7 - 2.0 mmol/L   Glucose by meter     Status: Abnormal   Result Value Ref Range    GLUCOSE BY METER POCT 39 (LL) 70 - 99 mg/dL   Glucose by meter     Status: Abnormal   Result Value Ref Range    GLUCOSE BY METER POCT 34 (LL) 70 - 99 mg/dL   Glucose by meter     Status: Abnormal   Result Value Ref Range    GLUCOSE BY METER POCT 161 (H) 70 - 99 mg/dL   Glucose by meter     Status: Abnormal   Result Value Ref Range    GLUCOSE BY METER POCT 111 (H) 70 - 99 mg/dL   Magnesium     Status: Abnormal   Result Value Ref Range    Magnesium 2.6 (H) 1.7 - 2.3 mg/dL   Phosphorus     Status: Normal   Result Value Ref Range    Phosphorus 3.6 2.5 - 4.5 mg/dL   Glucose by meter     Status: Abnormal   Result Value Ref Range    GLUCOSE BY METER POCT 259 (H) 70 - 99 mg/dL   Glucose by meter     Status: Abnormal   Result Value Ref Range    GLUCOSE BY METER POCT 341 (H) 70 - 99 mg/dL   CBC with platelets and differential     Status: Abnormal   Result Value Ref Range    WBC Count 5.0 4.0 - 11.0 10e3/uL    RBC Count 3.59 (L) 3.80 - 5.20 10e6/uL    Hemoglobin 10.6 (L) 11.7 - 15.7 g/dL    Hematocrit 33.2 (L) 35.0 - 47.0 %    MCV 93 78 - 100 fL    MCH 29.5 26.5 - 33.0 pg    MCHC 31.9 31.5 - 36.5 g/dL    RDW 14.8 10.0 - 15.0 %    Platelet Count 151 150 - 450 10e3/uL    % Neutrophils 56 %    % Lymphocytes 35 %    % Monocytes 5 %    % Eosinophils 2 %    % Basophils 1 %    % Immature Granulocytes 1 %    NRBCs per 100 WBC 0 <1 /100    Absolute Neutrophils 2.8 1.6 - 8.3 10e3/uL    Absolute Lymphocytes 1.7 0.8 - 5.3 10e3/uL    Absolute Monocytes 0.3 0.0 - 1.3 10e3/uL    Absolute Eosinophils 0.1 0.0 - 0.7 10e3/uL    Absolute Basophils 0.0 0.0 - 0.2 10e3/uL    Absolute Immature Granulocytes 0.0 <=0.4 10e3/uL    Absolute NRBCs 0.0 10e3/uL   CBC with platelets differential     Status: None    Narrative    The following orders were created for panel order CBC with platelets differential.  Procedure                                Abnormality         Status                     ---------                               -----------         ------                     CBC with platelets and d...[[id number]]                      Final result                 Please view results for these tests on the individual orders.   CBC with Platelets & Differential     Status: Abnormal    Narrative    The following orders were created for panel order CBC with Platelets & Differential.  Procedure                               Abnormality         Status                     ---------                               -----------         ------                     CBC with platelets and d...[[id number]]  Abnormal            Final result                 Please view results for these tests on the individual orders.          Exam:    Vitals:  B/P: 168/70, T: 97.9, P: 100, R: 16  Constitutional: Pt is oriented to person, place, and time.Pt appears well-developed and well-nourished.   HENT:   Head: Normocephalic and atraumatic.   Eyes: Conjunctivae are normal. Pupils are equal, round, and reactive to light.   Neck: Normal range of motion. Neck supple.   Cardiovascular: Normal rate, regular rhythm, normal heart sounds and intact distal pulses.    Pulmonary/Chest: Effort normal and breath sounds normal. No respiratory distress. Pt has no wheezes. Pt has no rales  Abdominal: Soft. Bowel sounds are normal. Pt exhibits no distension and no mass. No tenderness. Pt has no rebound and no guarding.   Musculoskeletal: Normal range of motion. Pt exhibits no edema.   Neurological: Pt is alert and oriented to person, place, and time. Normal reflexes.   Skin: Skin is warm and dry. No rash noted.   Psychiatric: Pt has a normal mood and affect. Behavior is normal. Judgment and thought content normal.     Signed:  EDUAR Sharma-BC, NP  Emergency Department  [phone] Ex [id number]  June 17, 2023 at 9:51 AM

## 2023-06-17 NOTE — PROGRESS NOTES
Observation goals  -diagnostic tests and consults completed and resulted - not met  -vital signs normal or at patient baseline -Not met, BP elevated. Hydralazine given  -tolerating oral intake to maintain hydration- Met, on pureed level 4 diet with thin liquids  -adequate pain control on oral analgesics -met  -returns to baseline functional status -Not met  -safe disposition plan has been identified -Not met  Nurse to notify provider when observation goals have been met and patient is ready for discharge.    BP (!) 182/92 (BP Location: Left arm)   Pulse 103   Temp 97.6  F (36.4  C) (Oral)   Resp 16   SpO2 100%

## 2023-06-17 NOTE — PROGRESS NOTES
"IP PT Evaluation:    Geovany Garcia PT  Pager #[phone]     06/17/23 1028   Appointment Info   Signing Clinician's Name / Credentials (PT) Geovany Garcia PT, DPT   Quick Adds   Quick Adds Certification   Living Environment   People in Home child(tiffany), adult   Current Living Arrangements apartment   Home Accessibility no concerns   Transportation Anticipated family or friend will provide   Living Environment Comments Lives in an apartment, has 2 daughters that split time living with pt as their PCAs.   Self-Care   Equipment Currently Used at Home walker, standard;wheelchair, manual;shower chair   Fall history within last six months yes   Number of times patient has fallen within last six months   (\"more than I should\")   Activity/Exercise/Self-Care Comment Mod-IND with FWW in apartment, manual wc in community. Daughters/PCAs assist with mobility and I/ADLs prn.   General Information   Onset of Illness/Injury or Date of Surgery 06/16/23   Referring Physician Destinee Castro PA-C   Patient/Family Therapy Goals Statement (PT) return home   Pertinent History of Current Problem (include personal factors and/or comorbidities that impact the POC) 62F with HTN, CKD, DM 1 recent hosp for DKA now returning same day as discharge for ongoing difficulty with nausea at home   Existing Precautions/Restrictions fall   General Observations Pt supine, pleasant, agreeable to session. 1:1 present.   Cognition   Cognitive Status Comments Baseline dementia; Alert, engages, follows commands.   Integumentary/Edema   Integumentary/Edema no deficits were identifed   Posture    Posture Forward head position   Range of Motion (ROM)   Range of Motion ROM is WFL   Strength (Manual Muscle Testing)   Strength Comments Generalized weakness though sufficient strength for basic mobility; no focal deficits noted.   Bed Mobility   Comment, (Bed Mobility) IND   Transfers   Comment, (Transfers) SBA   Gait/Stairs (Locomotion)   Comment, " (Gait/Stairs) CGA, 2UE on IV pole   Balance   Balance Comments IND seated; SB-CGA static/dynamic stance with BUE support on AD   Coordination   Coordination no deficits were identified   Muscle Tone   Muscle Tone no deficits were identified   Clinical Impression   Criteria for Skilled Therapeutic Intervention Yes, treatment indicated   PT Diagnosis (PT) Impaired functional mobility   Influenced by the following impairments Generalized weakness/deconditioning, baseline cognition   Functional limitations due to impairments gait, balance, safety awanreness   Clinical Presentation (PT Evaluation Complexity) Stable/Uncomplicated   Clinical Presentation Rationale Clinical judgment   Clinical Decision Making (Complexity) low complexity   Planned Therapy Interventions (PT) balance training;gait training;patient/family education;strengthening;progressive activity/exercise;risk factor education;home program guidelines   Risk & Benefits of therapy have been explained evaluation/treatment results reviewed;care plan/treatment goals reviewed;risks/benefits reviewed;current/potential barriers reviewed;participants voiced agreement with care plan;participants included;patient;daughter   PT Total Evaluation Time   PT Eval, Low Complexity Minutes (30880) 7   Therapy Certification   Start of care date 06/16/23   Certification date from 06/17/23   Certification date to 07/01/23   Medical Diagnosis debility   Physical Therapy Goals   PT Frequency One time eval and treatment only   PT Predicted Duration/Target Date for Goal Attainment 06/17/23   PT Goals Bed Mobility;Transfers;Gait   PT: Bed Mobility Independent;Supine to/from sit   PT: Transfers Independent;Sit to/from stand   PT: Gait Modified independent;Assistive device;Rolling walker;Greater than 200 feet   PT Discharge Planning   PT Plan dc from IP PT   PT Discharge Recommendation (DC Rec) home with assist;home with home care physical therapy   PT Rationale for DC Rec Pt appears to  be nearing functional baseline and appropriate for home discharge with continued PCA services. Would benefit from HH PT to address balance/safety and reduce falls.   PT Brief overview of current status Ax1 FWW   Total Session Time   Total Session Time (sum of timed and untimed services) 7     [facility]  OUTPATIENT PHYSICAL THERAPY EVALUATION  PLAN OF TREATMENT FOR OUTPATIENT REHABILITATION  (COMPLETE FOR INITIAL CLAIMS ONLY)  Patient's Last Name, First Name, M.I.  YOB: 1960  CassieIsabell  R                        Provider's Name  [facility] Medical Record No.  [id number]                             Onset Date:  06/16/23   Start of Care Date:  (P) 06/16/23   Type:     _X_PT   ___OT   ___SLP Medical Diagnosis:  (P) debility              PT Diagnosis:  (P) Impaired functional mobility Visits from SOC:  1     See note for plan of treatment, functional goals and certification details    I CERTIFY THE NEED FOR THESE SERVICES FURNISHED UNDER        THIS PLAN OF TREATMENT AND WHILE UNDER MY CARE     (Physician co-signature of this document indicates review and certification of the therapy plan).

## 2023-06-17 NOTE — PROGRESS NOTES
Observation goals  -diagnostic tests and consults completed and resulted - Pending  -vital signs normal or at patient baseline -Not met, BP still elevated. Provider notified. Bolus given x1   -tolerating oral intake to maintain hydration- Not met, patient is slow in swallowing.  -adequate pain control on oral analgesics  -Met  -safe disposition plan has been identified -Not met

## 2023-06-17 NOTE — PROGRESS NOTES
Brief Social Work Note    Expected Discharge Date:  TBD     Concerns to be Addressed:    [facility] referrals    Additional Information:    0933 Per SW handoff, SW submitted [facility] referral for PT/OT/SLP and RN (medication set-up and teaching) through referral hub.    [address] [facility]. (ph:[phone] fx:[phone]) accepted patient for home care.    SW will continue to follow as needed.    ROBIN Carlos, LGSW  ED/OBS   [facility]  Phone: [phone]  Pager: [phone]  Fax: [phone]     On-call pager, [phone], 4:00 pm to midnight

## 2023-06-17 NOTE — PROGRESS NOTES
Observation goals  -diagnostic tests and consults completed and resulted - Pending  -vital signs normal or at patient baseline -Not met, BP recovered but still elevated  -tolerating oral intake to maintain hydration- Not met patient is slow in swallowing.  -adequate pain control on oral analgesics  -Met  -safe disposition plan has been identified -Not met

## 2023-06-17 NOTE — PROGRESS NOTES
Observation goals  -diagnostic tests and consults completed and resulted - Pending  -vital signs normal or at patient baseline -Not met, BP still elevated  -tolerating oral intake to maintain hydration- Not met patient is slow in swallowing.  -adequate pain control on oral analgesics  -Met  -safe disposition plan has been identified -Not met

## 2023-06-18 VITALS
SYSTOLIC BLOOD PRESSURE: 136 MMHG | DIASTOLIC BLOOD PRESSURE: 83 MMHG | TEMPERATURE: 98 F | OXYGEN SATURATION: 98 % | HEART RATE: 93 BPM | RESPIRATION RATE: 20 BRPM

## 2023-06-18 LAB
ALBUMIN SERPL BCG-MCNC: 3.1 G/DL (ref 3.5–5.2)
ALP SERPL-CCNC: 111 U/L (ref 35–104)
ALT SERPL W P-5'-P-CCNC: 24 U/L (ref 0–50)
ANION GAP SERPL CALCULATED.3IONS-SCNC: 13 MMOL/L (ref 7–15)
AST SERPL W P-5'-P-CCNC: 29 U/L (ref 0–45)
BACTERIA UR CULT: ABNORMAL
BASOPHILS # BLD AUTO: 0 10E3/UL (ref 0–0.2)
BASOPHILS NFR BLD AUTO: 1 %
BILIRUB SERPL-MCNC: 0.3 MG/DL
BUN SERPL-MCNC: 16 MG/DL (ref 8–23)
CALCIUM SERPL-MCNC: 8.2 MG/DL (ref 8.8–10.2)
CHLORIDE SERPL-SCNC: 110 MMOL/L (ref 98–107)
CREAT SERPL-MCNC: 1.25 MG/DL (ref 0.51–0.95)
DEPRECATED HCO3 PLAS-SCNC: 21 MMOL/L (ref 22–29)
EOSINOPHIL # BLD AUTO: 0.1 10E3/UL (ref 0–0.7)
EOSINOPHIL NFR BLD AUTO: 3 %
ERYTHROCYTE [DISTWIDTH] IN BLOOD BY AUTOMATED COUNT: 15.5 % (ref 10–15)
GFR SERPL CREATININE-BSD FRML MDRD: 48 ML/MIN/1.73M2
GLUCOSE BLDC GLUCOMTR-MCNC: 130 MG/DL (ref 70–99)
GLUCOSE BLDC GLUCOMTR-MCNC: 180 MG/DL (ref 70–99)
GLUCOSE BLDC GLUCOMTR-MCNC: 275 MG/DL (ref 70–99)
GLUCOSE BLDC GLUCOMTR-MCNC: 310 MG/DL (ref 70–99)
GLUCOSE BLDC GLUCOMTR-MCNC: 76 MG/DL (ref 70–99)
GLUCOSE BLDC GLUCOMTR-MCNC: 88 MG/DL (ref 70–99)
GLUCOSE BLDC GLUCOMTR-MCNC: 88 MG/DL (ref 70–99)
GLUCOSE BLDC GLUCOMTR-MCNC: 95 MG/DL (ref 70–99)
GLUCOSE SERPL-MCNC: 149 MG/DL (ref 70–99)
HCT VFR BLD AUTO: 32.4 % (ref 35–47)
HGB BLD-MCNC: 10.3 G/DL (ref 11.7–15.7)
IMM GRANULOCYTES # BLD: 0.1 10E3/UL
IMM GRANULOCYTES NFR BLD: 2 %
LYMPHOCYTES # BLD AUTO: 1.6 10E3/UL (ref 0.8–5.3)
LYMPHOCYTES NFR BLD AUTO: 36 %
MCH RBC QN AUTO: 29.5 PG (ref 26.5–33)
MCHC RBC AUTO-ENTMCNC: 31.8 G/DL (ref 31.5–36.5)
MCV RBC AUTO: 93 FL (ref 78–100)
MONOCYTES # BLD AUTO: 0.3 10E3/UL (ref 0–1.3)
MONOCYTES NFR BLD AUTO: 7 %
NEUTROPHILS # BLD AUTO: 2.3 10E3/UL (ref 1.6–8.3)
NEUTROPHILS NFR BLD AUTO: 51 %
NRBC # BLD AUTO: 0 10E3/UL
NRBC BLD AUTO-RTO: 0 /100
PLAT MORPH BLD: NORMAL
PLATELET # BLD AUTO: 100 10E3/UL (ref 150–450)
POTASSIUM SERPL-SCNC: 4.2 MMOL/L (ref 3.4–5.3)
PROT SERPL-MCNC: 5.3 G/DL (ref 6.4–8.3)
RBC # BLD AUTO: 3.49 10E6/UL (ref 3.8–5.2)
RBC MORPH BLD: NORMAL
SODIUM SERPL-SCNC: 144 MMOL/L (ref 136–145)
WBC # BLD AUTO: 4.4 10E3/UL (ref 4–11)

## 2023-06-18 PROCEDURE — 96376 TX/PRO/DX INJ SAME DRUG ADON: CPT

## 2023-06-18 PROCEDURE — 85025 COMPLETE CBC W/AUTO DIFF WBC: CPT | Performed by: PHYSICIAN ASSISTANT

## 2023-06-18 PROCEDURE — 258N000003 HC RX IP 258 OP 636: Performed by: EMERGENCY MEDICINE

## 2023-06-18 PROCEDURE — 250N000013 HC RX MED GY IP 250 OP 250 PS 637: Performed by: NURSE PRACTITIONER

## 2023-06-18 PROCEDURE — 250N000011 HC RX IP 250 OP 636: Performed by: PHYSICIAN ASSISTANT

## 2023-06-18 PROCEDURE — 250N000013 HC RX MED GY IP 250 OP 250 PS 637: Performed by: PHYSICIAN ASSISTANT

## 2023-06-18 PROCEDURE — 99231 SBSQ HOSP IP/OBS SF/LOW 25: CPT | Mod: GC | Performed by: INTERNAL MEDICINE

## 2023-06-18 PROCEDURE — 36415 COLL VENOUS BLD VENIPUNCTURE: CPT | Performed by: PHYSICIAN ASSISTANT

## 2023-06-18 PROCEDURE — 258N000001 HC RX 258: Performed by: PHYSICIAN ASSISTANT

## 2023-06-18 PROCEDURE — 258N000003 HC RX IP 258 OP 636: Performed by: PHYSICIAN ASSISTANT

## 2023-06-18 PROCEDURE — 82962 GLUCOSE BLOOD TEST: CPT

## 2023-06-18 PROCEDURE — 80053 COMPREHEN METABOLIC PANEL: CPT | Performed by: PHYSICIAN ASSISTANT

## 2023-06-18 PROCEDURE — G0378 HOSPITAL OBSERVATION PER HR: HCPCS

## 2023-06-18 RX ORDER — DEXTROSE MONOHYDRATE 25 G/50ML
12.5 INJECTION, SOLUTION INTRAVENOUS ONCE
Status: COMPLETED | OUTPATIENT
Start: 2023-06-18 | End: 2023-06-18

## 2023-06-18 RX ORDER — CEFPODOXIME PROXETIL 200 MG/1
200 TABLET, FILM COATED ORAL 2 TIMES DAILY
Qty: 10 TABLET | Refills: 0 | Status: SHIPPED | OUTPATIENT
Start: 2023-06-18 | End: 2023-06-23

## 2023-06-18 RX ORDER — AMLODIPINE BESYLATE 5 MG/1
5 TABLET ORAL DAILY
Qty: 30 TABLET | Refills: 0 | Status: SHIPPED | OUTPATIENT
Start: 2023-06-19 | End: 2023-09-14

## 2023-06-18 RX ORDER — ONDANSETRON 4 MG/1
4 TABLET, ORALLY DISINTEGRATING ORAL EVERY 6 HOURS PRN
Qty: 20 TABLET | Refills: 0 | Status: SHIPPED | OUTPATIENT
Start: 2023-06-18 | End: 2023-10-16

## 2023-06-18 RX ORDER — SODIUM CHLORIDE 9 MG/ML
INJECTION, SOLUTION INTRAVENOUS CONTINUOUS
Status: DISCONTINUED | OUTPATIENT
Start: 2023-06-18 | End: 2023-06-18 | Stop reason: HOSPADM

## 2023-06-18 RX ORDER — CEFPODOXIME PROXETIL 200 MG/1
200 TABLET, FILM COATED ORAL 2 TIMES DAILY
Status: CANCELLED | OUTPATIENT
Start: 2023-06-18 | End: 2023-06-23

## 2023-06-18 RX ADMIN — CEFTRIAXONE SODIUM 1 G: 1 INJECTION, POWDER, FOR SOLUTION INTRAMUSCULAR; INTRAVENOUS at 06:18

## 2023-06-18 RX ADMIN — CARVEDILOL 12.5 MG: 12.5 TABLET, FILM COATED ORAL at 08:31

## 2023-06-18 RX ADMIN — CLOPIDOGREL BISULFATE 75 MG: 75 TABLET ORAL at 08:30

## 2023-06-18 RX ADMIN — DEXTROSE MONOHYDRATE 12.5 ML: 25 INJECTION, SOLUTION INTRAVENOUS at 03:55

## 2023-06-18 RX ADMIN — LEVETIRACETAM 500 MG: 500 TABLET, FILM COATED ORAL at 08:30

## 2023-06-18 RX ADMIN — SODIUM CHLORIDE: 9 INJECTION, SOLUTION INTRAVENOUS at 10:04

## 2023-06-18 RX ADMIN — ASPIRIN 81 MG CHEWABLE TABLET 81 MG: 81 TABLET CHEWABLE at 08:30

## 2023-06-18 RX ADMIN — LEVOTHYROXINE SODIUM 75 MCG: 0.03 TABLET ORAL at 08:31

## 2023-06-18 RX ADMIN — DULOXETINE HYDROCHLORIDE 20 MG: 20 CAPSULE, DELAYED RELEASE ORAL at 11:41

## 2023-06-18 RX ADMIN — AMLODIPINE BESYLATE 5 MG: 5 TABLET ORAL at 08:31

## 2023-06-18 RX ADMIN — DEXTROSE AND SODIUM CHLORIDE: 5; 900 INJECTION, SOLUTION INTRAVENOUS at 05:52

## 2023-06-18 RX ADMIN — SODIUM CHLORIDE: 9 INJECTION, SOLUTION INTRAVENOUS at 00:45

## 2023-06-18 ASSESSMENT — ACTIVITIES OF DAILY LIVING (ADL)
ADLS_ACUITY_SCORE: 35
ADLS_ACUITY_SCORE: 37
ADLS_ACUITY_SCORE: 35
ADLS_ACUITY_SCORE: 33
ADLS_ACUITY_SCORE: 35
ADLS_ACUITY_SCORE: 37
ADLS_ACUITY_SCORE: 35

## 2023-06-18 NOTE — UTILIZATION REVIEW
"[facility]    Admission Status; Secondary Review Determination     Admission Date: 6/16/2023 12:00 AM      Under the authority of the Utilization Management Committee, the utilization review process indicated a secondary review on the above patient.  The review outcome is based on review of the medical records, discussions with staff, and applying clinical experience noted on the date of the review.          (x) Observation Status Appropriate - This patient does not meet hospital inpatient criteria and is placed in observation status. If this patient's primary payer is Medicare and was admitted as an inpatient, Condition Code 44 should be used and patient status changed to \"observation\".     RATIONALE FOR DETERMINATION   62-year-old female with a history of hypertension, hyperlipidemia, type 1 diabetes mellitus, chronic kidney disease, dementia, seizures and stroke was admitted on 6/16 with persistent nausea and vomiting.  Laboratory work-up was reassuring.  There was no evidence of DKA or sepsis.  The patient was provided supportive cares and symptomatic management with IV fluids and antiemetics.  She is tolerating oral intake.  She has had intermittent lactic acidosis without evidence of sepsis or instability.  She continues to receive fluids and serial labs have been obtained.  The patient does appear to be showing some improvement.  Some of her symptoms appear to be behavioral in nature.  At the time of this review the patient appears to be stable and gradually improving and there is no medical necessity for inpatient hospitalization.  Observation status remains appropriate.    The severity of illness, intensity of service provided, expected LOS and risk for adverse outcome make the care appropriate for further observation; however, doesn't meet criteria for hospital inpatient admission.       The information on this document is developed by the utilization review team in order for the business office to ensure " compliance.  This only denotes the appropriateness of proper admission status and does not reflect the quality of care rendered.         The definitions of Inpatient Status and Observation Status used in making the determination above are those provided in the CMS Coverage Manual, Chapter 1 and Chapter 6, section 70.4.      Sincerely,     Luiz Vargas DO MPH   Physician Advisor  Utilization Review  [facility]

## 2023-06-18 NOTE — PROGRESS NOTES
[facility] ED Observation Admission Note    Chief Complaint   Patient presents with    Vomiting       Assessment/Plan:  Isabell Matthews is a 62 year old female with a past medical history of HTN, HLD, type I diabetes mellitus, CKD stage 3b, hyperglycemia, hyperthyroidism, dementia, seizures, and previous stroke.     #T1DM  #Generalized weakness  #Persistent nausea and vomiting  # UTI   Patient is a poor historian, family unreachable. History mainly provided by medical records. Per chart review, patient was recently hospitalized for DKA and was discharged yesterday 6/15 at around 5pm. Per ED note, patient's daughter reported that patient continued to have episodes of emesis after leaving the hospital. As result, she called triage and was instructed to return the patient to the ER. Patient on insulin, but it is unclear if she is complaint given her cognitive decline 2/2 vascular vs alzheimer's. Hx of multiple hospitalizations for DKA. She was seen by endocrine in this last admission and was discharged on lantus 18 units q 24hr, aspart 4 units TID with meals, and aspart medium correction scale. Upon arrival in the ED, she was noted to be nauseous. She was given IV antiemetics and IVF with mild improvement. No signs of DKA with AG serum ketones being wnl. Alk phos mildly elevated at 147, AST and ALT, and lipase wnl. No indication of infection process, lactic acid wnl, no leukocytosis. HGB stable at 12.4. VBG edwin, no signs of acidosis. Covid19/influnzea  A/B negative. CT abdomen, a very small amount of nonspecific free fluid in the pelvis. No other convincing cause of acute pain identified in the abdomen or pelvis. No evidence of bowel obstruction. Colonic diverticulosis without evidence of diverticulitis. Patient is admitted to ed observation unit for n/v management and PT assessment. Patient was able to tolerate breakfast this morning. Endocrinology consulted and recommended the following regimen for discharge: Lantus 14  units  NovoLog 4 units with meals (to hold if not eating)+ medium dose sliding scale. We will try to contact her daughters Sayda today no answer .She needs follow-up postdischarge at [facility] with Carolina Esteves, will send message for arranging the appointment.  Recent Labs   Lab 06/18/23  0949 06/18/23  0745 06/18/23  0703 06/18/23  0626 06/18/23  0527 06/18/23  0342   * 149* 130* 95 88 76       UA with negative ketones, moderate LE, WBC 43. UC positive for > 100,000 pending sensitivities.  Treating UTI wiith ceftriaxone (6/17-6/18) and was switched to cefpodoxime 200 mg BID for five days (6/19 until 6/25).     PT assessed patient and recommended home with home care     # Elevated Lactic Acidosis - IV 1 liter bolus x 2. Improved after 2.0    #Seizures  #CVA  She has a past medical history significant for strokes, with recent CTA showing critical M1/M2 stenosis and most recent hospitalization in May 2023 at neurology service. She does not complain of headache today. She does have delayed speech, but that was noted in previous admission as well. The patient is essentially aphasic but with intact comprehension and able to follow command. Patient noted to be pocketing bills and holding food in her mouth. She did have SLP evaluation Recommend the pt continue her baseline diet of puree (IDDSI 4) and thin liquids with 1:1 assistance. Ensure the pt is upright for all PO. Check oral cavity for pocketing and cue the pt to swallow. Keppra level 7.8. Discussed with neurology Unsure if patient is taking medication consistently. Will continue home dosing. Recheck Keppra level at next neurology appointment. (will need this set up, referral sent)    - PTA Aspirin 81 mg daily  - PTA Plavix 75 mg daily   - PTA Keppra 500 mg BID   - PTA Atorvastatin 40 mg daily         #HTN - BP still elevated at 192/93. Received IV Hydralazine overnight.   - IV Hydralazine prn.   - Add Norvasc 5 mg daily  - PTA  Carvedilol 12.5 mg BID   - PTA Doxazosin 1 mg HS      #CKD  Cr 1.25. Baseline creatinine ~1.7.   - Avoid nephrotoxic agents     #Hypothyroidism  - PTA levothyroxin 75 mg daily      #Dementia   2/2 Vascular vs Alzheimer's.  On her  MRI, no signs suggestive of CVA.  She did have microhemorrhages on SWI, but all of these were deep.     #Deprssion:  - PTA Duloxetine 20 mg daily     History:    Past Medical History:   Diagnosis Date    Chronic pain     Coronary atherosclerosis 2016    Essential hypertension     Ex-cigarette smoker     quit 2018 over 35 years    Ex-cigarette smoker     Hyperlipidemia     Hypothyroid     Seizures (H)     Stroke (H)     Vascular dementia (H)        Past Surgical History:   Procedure Laterality Date    athroplasty hip Bilateral     ,     OTHER SURGICAL HISTORY      athroplasty hip    PICC DOUBLE LUMEN PLACEMENT Right 2023    right basilic 5 fr dl power picc 39 cm    STENT         Family History   Problem Relation Age of Onset    Acute Myocardial Infarction Father     Heart Disease Maternal Grandmother     Dementia Maternal Grandmother     Myocardial Infarction Father     Dementia Paternal Grandmother     Heart Disease Paternal Grandmother        Social History     Socioeconomic History    Marital status:      Spouse name: Not on file    Number of children: Not on file    Years of education: Not on file    Highest education level: Not on file   Occupational History    Not on file   Tobacco Use    Smoking status: Former     Packs/day: 0.50     Years: 35.00     Pack years: 17.50     Types: Cigarettes     Quit date: 2018     Years since quittin.9    Smokeless tobacco: Never   Vaping Use    Vaping status: Not on file   Substance and Sexual Activity    Alcohol use: Not Currently    Drug use: Not Currently    Sexual activity: Not Currently   Other Topics Concern    Parent/sibling w/ CABG, MI or angioplasty before 65F 55M? Not Asked   Social History Narrative     ** Merged History Encounter **         Recently moved to [facility] with Daughter Charli who is her pca     Social Determinants of Health     Financial Resource Strain: Not on file   Food Insecurity: Not on file   Transportation Needs: Not on file   Physical Activity: Not on file   Stress: Not on file   Social Connections: Not on file   Intimate Partner Violence: Not on file   Housing Stability: Not on file       No current facility-administered medications on file prior to encounter.  Vitamin D3 (CHOLECALCIFEROL) 125 MCG (5000 UT) tablet, Take 125 mcg by mouth daily  acetaminophen (TYLENOL) 500 MG tablet, Take 500 mg by mouth every 6 hours as needed for mild pain  aspirin (ASA) 81 MG chewable tablet, Take 1 tablet (81 mg) by mouth daily  atorvastatin (LIPITOR) 40 MG tablet, Take 1 tablet (40 mg) by mouth daily  blood glucose monitoring (NO BRAND SPECIFIED) meter device kit, Use to test blood sugar 4 times daily.  Please provide glucose meter that is covered by insurance.  carvedilol (COREG) 12.5 MG tablet, Take 1 tablet (12.5 mg) by mouth 2 times daily (with meals)  clopidogrel (PLAVIX) 75 MG tablet, 1 tablet (75 mg) by Oral or NG Tube route daily  cyanocobalamin (VITAMIN B-12) 1000 MCG tablet, Take 1 tablet (1,000 mcg) by mouth daily  diclofenac (VOLTAREN) 1 % topical gel, Apply 2 g topically 4 times daily as needed for moderate pain  doxazosin (CARDURA) 1 MG tablet, Take 1 tablet (1 mg) by mouth At Bedtime  DULoxetine (CYMBALTA) 20 MG capsule, Take 1 capsule (20 mg) by mouth daily  gabapentin (NEURONTIN) 100 MG capsule, Take 1 capsule (100 mg) by mouth At Bedtime  insulin aspart (NOVOLOG PEN) 100 UNIT/ML pen, Inject 4 Units Subcutaneous 3 times daily (with meals)  insulin aspart (NOVOLOG PEN) 100 UNIT/ML pen, Inject 1-6 Units Subcutaneous 3 times daily (with meals) Correction Scale - MEDIUM INSULIN RESISTANCE DOSING     Do Not give Correction Insulin if BG less than 140.  For  - 189 give 1 unit.  For BG  190 - 239 give 2 units.  For  - 289 give 3 units.  For  - 339 give 4 units.  For  - 399 give 5 units.  For BG greater than or equal to 400 give 6 units.  Check blood glucose 3 times daily with meals and administer based on blood glucose.  Notify provider if glucose greater than or equal to 350 mg/dL after administration of correction dose.  If given at mealtime, administer within 30 minutes of start of meal.  insulin glargine (LANTUS PEN) 100 UNIT/ML pen, Inject 22 Units Subcutaneous every 24 hours  insulin pen needle (31G X 8 MM) 31G X 8 MM miscellaneous, Use 4 pen needles daily or as directed.  levETIRAcetam (KEPPRA) 500 MG tablet, Take 1 tablet (500 mg) by mouth 2 times daily (Patient not taking: Reported on 6/16/2023)  loratadine (CLARITIN) 10 mg tablet, [LORATADINE (CLARITIN) 10 MG TABLET] Take 10 mg by mouth daily.  melatonin 10 mg Tab, Take 10 mg by mouth nightly as needed  METHYLCELLULOSE, LAXATIVE, PO, Take 1 Tablespoonful by mouth daily as needed  nystatin (MYCOSTATIN) 216360 UNIT/GM external cream, Apply topically 2 times daily Until rash clear  ondansetron (ZOFRAN ODT) 4 MG ODT tab, Take 1 tablet (4 mg) by mouth every 8 hours as needed for nausea  prochlorperazine (COMPAZINE) 10 MG tablet, Take 1 tablet (10 mg) by mouth every 6 hours as needed for nausea or vomiting  SYNTHROID 75 MCG tablet, Take 1 tablet (75 mcg) by mouth daily  TRUE METRIX BLOOD GLUCOSE TEST test strip, USE TO TEST BLOOD SUGAR 4 TO 5 TIMES DAILY OR AS DIRECTED  Vitamin D3 (CHOLECALCIFEROL) 25 mcg (1000 units) tablet, Take 1 tablet (25 mcg) by mouth daily (Patient not taking: Reported on 6/16/2023)  zinc oxide (DESITIN) 20 % external ointment, Apply topically as needed for dry skin or irritation (Patient not taking: Reported on 6/16/2023)        Data:    Results for orders placed or performed during the hospital encounter of 06/16/23   CT Abdomen Pelvis w Contrast     Status: None    Narrative    EXAM: CT ABDOMEN AND  PELVIS WITH CONTRAST  LOCATION: [facility]  DATE/TIME: 6/16/2023 3:47 AM CDT    INDICATION: Left lower quadrant pain. Persistent nausea and vomiting.  COMPARISON: 04/10/2022 - CT chest, abdomen and pelvis.    TECHNIQUE: CT scan of the abdomen and pelvis was performed following injection of IV contrast. Multiplanar reformats were obtained. Dose reduction techniques were used.  CONTRAST: 80 mL Isovue 370.    FINDINGS:    LOWER CHEST: A trace amount of bilateral pleural fluid. Coronary artery calcification.    HEPATOBILIARY: Unremarkable.    SPLEEN: Unremarkable.    PANCREAS: Unremarkable.    ADRENAL GLANDS: Unremarkable.    KIDNEYS/BLADDER: Moderate multifocal bilateral renal atrophy. A 3.0 x 2.0 cm mildly heterogeneous low-attenuation region within the central aspect of the upper pole of the left kidney (series 5 image 126). This is nonspecific and not well characterized   on this study, but has decreased in size since the comparison study dated 04/10/2022 on which it measured 3.6 x 3.2 cm.    BOWEL: No dilatation of the small or large bowel. A few colonic diverticula are present, without evidence of diverticulitis. The appendix is not visualized. No visualized bowel wall thickening, pneumatosis or free intraperitoneal gas.    LYMPH NODES: Unremarkable.    PELVIC ORGANS: 5.0 x 3.2 cm relatively homogeneous intermediate attenuation ovoid structure in the right hemipelvis, abutting the uterus (series 4 image 307). On the comparison study dated 04/10/2022, this appeared to be an exophytic uterine fibroid.    MUSCULOSKELETAL: Partial visualization of bilateral hip arthroplasties.    OTHER: A very small amount of free fluid in the pelvis.      Impression    IMPRESSION:   1.  A very small amount of nonspecific free fluid in the pelvis.  2.  No other convincing cause of acute pain identified in the abdomen or pelvis. No evidence of bowel obstruction.  3.  Colonic diverticulosis  without evidence of diverticulitis.                 Ketone Beta-Hydroxybutyrate Quantitative     Status: Normal   Result Value Ref Range    Ketone (Beta-Hydroxybutyrate) Quantitative 0.30 <=0.30 mmol/L   Blood gas venous     Status: Abnormal   Result Value Ref Range    pH Venous 7.35 7.32 - 7.43    pCO2 Venous 49 40 - 50 mm Hg    pO2 Venous 21 (L) 25 - 47 mm Hg    Bicarbonate Venous 27 21 - 28 mmol/L    Base Excess/Deficit (+/-) 0.5 -7.7 - 1.9 mmol/L    FIO2 21    Comprehensive metabolic panel     Status: Abnormal   Result Value Ref Range    Sodium 135 (L) 136 - 145 mmol/L    Potassium 4.2 3.4 - 5.3 mmol/L    Chloride 102 98 - 107 mmol/L    Carbon Dioxide (CO2) 20 (L) 22 - 29 mmol/L    Anion Gap 13 7 - 15 mmol/L    Urea Nitrogen 17.3 8.0 - 23.0 mg/dL    Creatinine 1.44 (H) 0.51 - 0.95 mg/dL    Calcium 9.1 8.8 - 10.2 mg/dL    Glucose 236 (H) 70 - 99 mg/dL    Alkaline Phosphatase 147 (H) 35 - 104 U/L    AST 37 0 - 45 U/L    ALT 35 0 - 50 U/L    Protein Total 6.4 6.4 - 8.3 g/dL    Albumin 3.7 3.5 - 5.2 g/dL    Bilirubin Total 0.3 <=1.2 mg/dL    GFR Estimate 40 (L) >60 mL/min/1.73m2   Lactic acid whole blood     Status: Normal   Result Value Ref Range    Lactic Acid 1.6 0.7 - 2.0 mmol/L   Magnesium     Status: Abnormal   Result Value Ref Range    Magnesium 2.5 (H) 1.7 - 2.3 mg/dL   CBC with platelets and differential     Status: None   Result Value Ref Range    WBC Count 5.0 4.0 - 11.0 10e3/uL    RBC Count 4.19 3.80 - 5.20 10e6/uL    Hemoglobin 12.4 11.7 - 15.7 g/dL    Hematocrit 39.2 35.0 - 47.0 %    MCV 94 78 - 100 fL    MCH 29.6 26.5 - 33.0 pg    MCHC 31.6 31.5 - 36.5 g/dL    RDW 14.6 10.0 - 15.0 %    Platelet Count 167 150 - 450 10e3/uL    % Neutrophils 59 %    % Lymphocytes 34 %    % Monocytes 5 %    % Eosinophils 2 %    % Basophils 0 %    % Immature Granulocytes 0 %    NRBCs per 100 WBC 0 <1 /100    Absolute Neutrophils 3.0 1.6 - 8.3 10e3/uL    Absolute Lymphocytes 1.7 0.8 - 5.3 10e3/uL    Absolute Monocytes 0.3  0.0 - 1.3 10e3/uL    Absolute Eosinophils 0.1 0.0 - 0.7 10e3/uL    Absolute Basophils 0.0 0.0 - 0.2 10e3/uL    Absolute Immature Granulocytes 0.0 <=0.4 10e3/uL    Absolute NRBCs 0.0 10e3/uL   Extra Tube     Status: None    Narrative    The following orders were created for panel order Extra Tube.  Procedure                               Abnormality         Status                     ---------                               -----------         ------                     Extra Blue Top Tube[[id number]]                              Final result               Extra Red Top Tube[[id number]]                               Final result                 Please view results for these tests on the individual orders.   Extra Blue Top Tube     Status: None   Result Value Ref Range    Hold Specimen JIC    Extra Red Top Tube     Status: None   Result Value Ref Range    Hold Specimen JIC    Glucose by meter     Status: Abnormal   Result Value Ref Range    GLUCOSE BY METER POCT 214 (H) 70 - 99 mg/dL   Lipase     Status: Normal   Result Value Ref Range    Lipase 27 13 - 60 U/L   Asymptomatic Influenza A/B, RSV, & SARS-CoV2 PCR (COVID-19) Nose     Status: Normal    Specimen: Nose; Swab   Result Value Ref Range    Influenza A PCR Negative Negative    Influenza B PCR Negative Negative    RSV PCR Negative Negative    SARS CoV2 PCR Negative Negative    Narrative    Testing was performed using the Xpert Xpress CoV2/Flu/RSV Assay on the Cepheid GeneXpert Instrument. This test should be ordered for the detection of SARS-CoV-2, influenza, and RSV viruses in individuals who meet clinical and/or epidemiological criteria. Test performance is unknown in asymptomatic patients. This test is for in vitro diagnostic use under the FDA EUA for laboratories certified under CLIA to perform high or moderate complexity testing. This test has not been FDA cleared or approved. A negative result does not rule out the presence of PCR inhibitors in the specimen or  target RNA in concentration below the limit of detection for the assay. If only one viral target is positive but coinfection with multiple targets is suspected, the sample should be re-tested with another FDA cleared, approved, or authorized test, if coinfection would change clinical management. This test was validated by the [facility]. These laboratories are certified under the Clinical Laboratory Improvement Amendments of 1988 (CLIA-88) as qualified to perform high complexity laboratory testing.   UA with Microscopic reflex to Culture     Status: Abnormal    Specimen: Urine, NOS   Result Value Ref Range    Color Urine Light Yellow Colorless, Straw, Light Yellow, Yellow    Appearance Urine Slightly Cloudy (A) Clear    Glucose Urine Negative Negative mg/dL    Bilirubin Urine Negative Negative    Ketones Urine Negative Negative mg/dL    Specific Gravity Urine 1.014 1.003 - 1.035    Blood Urine Negative Negative    pH Urine 5.5 5.0 - 7.0    Protein Albumin Urine Negative Negative mg/dL    Urobilinogen Urine Normal Normal, 2.0 mg/dL    Nitrite Urine Negative Negative    Leukocyte Esterase Urine Moderate (A) Negative    Bacteria Urine Few (A) None Seen /HPF    Mucus Urine Present (A) None Seen /LPF    RBC Urine 0 <=2 /HPF    WBC Urine 43 (H) <=5 /HPF    Squamous Epithelials Urine 2 (H) <=1 /HPF    Narrative    Urine Culture ordered based on laboratory criteria   CBC with platelets     Status: Abnormal   Result Value Ref Range    WBC Count 4.9 4.0 - 11.0 10e3/uL    RBC Count 3.37 (L) 3.80 - 5.20 10e6/uL    Hemoglobin 10.0 (L) 11.7 - 15.7 g/dL    Hematocrit 31.4 (L) 35.0 - 47.0 %    MCV 93 78 - 100 fL    MCH 29.7 26.5 - 33.0 pg    MCHC 31.8 31.5 - 36.5 g/dL    RDW 14.7 10.0 - 15.0 %    Platelet Count 138 (L) 150 - 450 10e3/uL   Comprehensive metabolic panel     Status: Abnormal   Result Value Ref Range    Sodium 141 136 - 145 mmol/L    Potassium 3.8 3.4 - 5.3 mmol/L    Chloride 109 (H) 98 - 107 mmol/L     Carbon Dioxide (CO2) 21 (L) 22 - 29 mmol/L    Anion Gap 11 7 - 15 mmol/L    Urea Nitrogen 16.4 8.0 - 23.0 mg/dL    Creatinine 1.42 (H) 0.51 - 0.95 mg/dL    Calcium 8.3 (L) 8.8 - 10.2 mg/dL    Glucose 241 (H) 70 - 99 mg/dL    Alkaline Phosphatase 129 (H) 35 - 104 U/L    AST 29 0 - 45 U/L    ALT 31 0 - 50 U/L    Protein Total 5.8 (L) 6.4 - 8.3 g/dL    Albumin 3.3 (L) 3.5 - 5.2 g/dL    Bilirubin Total 0.2 <=1.2 mg/dL    GFR Estimate 41 (L) >60 mL/min/1.73m2   Glucose by meter     Status: Abnormal   Result Value Ref Range    GLUCOSE BY METER POCT 173 (H) 70 - 99 mg/dL   Glucose by meter     Status: Abnormal   Result Value Ref Range    GLUCOSE BY METER POCT 172 (H) 70 - 99 mg/dL   Keppra (Levetiracetam) Level     Status: Abnormal   Result Value Ref Range    Keppra (Levetiracetam) Level 7.8 (L) 10.0 - 40.0  g/mL   Glucose by meter     Status: Abnormal   Result Value Ref Range    GLUCOSE BY METER POCT 61 (L) 70 - 99 mg/dL   Glucose by meter     Status: Abnormal   Result Value Ref Range    GLUCOSE BY METER POCT 151 (H) 70 - 99 mg/dL   Glucose by meter     Status: Abnormal   Result Value Ref Range    GLUCOSE BY METER POCT 102 (H) 70 - 99 mg/dL   Comprehensive metabolic panel     Status: Abnormal   Result Value Ref Range    Sodium 135 (L) 136 - 145 mmol/L    Potassium 4.3 3.4 - 5.3 mmol/L    Chloride 104 98 - 107 mmol/L    Carbon Dioxide (CO2) 17 (L) 22 - 29 mmol/L    Anion Gap 14 7 - 15 mmol/L    Urea Nitrogen 16.8 8.0 - 23.0 mg/dL    Creatinine 1.33 (H) 0.51 - 0.95 mg/dL    Calcium 8.7 (L) 8.8 - 10.2 mg/dL    Glucose 354 (H) 70 - 99 mg/dL    Alkaline Phosphatase 116 (H) 35 - 104 U/L    AST 33 0 - 45 U/L    ALT 23 0 - 50 U/L    Protein Total 5.5 (L) 6.4 - 8.3 g/dL    Albumin 3.1 (L) 3.5 - 5.2 g/dL    Bilirubin Total 0.2 <=1.2 mg/dL    GFR Estimate 44 (L) >60 mL/min/1.73m2   Ketone Beta-Hydroxybutyrate Quantitative     Status: Normal   Result Value Ref Range    Ketone (Beta-Hydroxybutyrate) Quantitative 0.20 <=0.30 mmol/L    Lactic acid whole blood     Status: Abnormal   Result Value Ref Range    Lactic Acid 2.7 (H) 0.7 - 2.0 mmol/L   Glucose by meter     Status: Abnormal   Result Value Ref Range    GLUCOSE BY METER POCT 39 (LL) 70 - 99 mg/dL   Glucose by meter     Status: Abnormal   Result Value Ref Range    GLUCOSE BY METER POCT 34 (LL) 70 - 99 mg/dL   Glucose by meter     Status: Abnormal   Result Value Ref Range    GLUCOSE BY METER POCT 161 (H) 70 - 99 mg/dL   Glucose by meter     Status: Abnormal   Result Value Ref Range    GLUCOSE BY METER POCT 111 (H) 70 - 99 mg/dL   Magnesium     Status: Abnormal   Result Value Ref Range    Magnesium 2.6 (H) 1.7 - 2.3 mg/dL   Phosphorus     Status: Normal   Result Value Ref Range    Phosphorus 3.6 2.5 - 4.5 mg/dL   Glucose by meter     Status: Abnormal   Result Value Ref Range    GLUCOSE BY METER POCT 259 (H) 70 - 99 mg/dL   Glucose by meter     Status: Abnormal   Result Value Ref Range    GLUCOSE BY METER POCT 341 (H) 70 - 99 mg/dL   CBC with platelets and differential     Status: Abnormal   Result Value Ref Range    WBC Count 5.0 4.0 - 11.0 10e3/uL    RBC Count 3.59 (L) 3.80 - 5.20 10e6/uL    Hemoglobin 10.6 (L) 11.7 - 15.7 g/dL    Hematocrit 33.2 (L) 35.0 - 47.0 %    MCV 93 78 - 100 fL    MCH 29.5 26.5 - 33.0 pg    MCHC 31.9 31.5 - 36.5 g/dL    RDW 14.8 10.0 - 15.0 %    Platelet Count 151 150 - 450 10e3/uL    % Neutrophils 56 %    % Lymphocytes 35 %    % Monocytes 5 %    % Eosinophils 2 %    % Basophils 1 %    % Immature Granulocytes 1 %    NRBCs per 100 WBC 0 <1 /100    Absolute Neutrophils 2.8 1.6 - 8.3 10e3/uL    Absolute Lymphocytes 1.7 0.8 - 5.3 10e3/uL    Absolute Monocytes 0.3 0.0 - 1.3 10e3/uL    Absolute Eosinophils 0.1 0.0 - 0.7 10e3/uL    Absolute Basophils 0.0 0.0 - 0.2 10e3/uL    Absolute Immature Granulocytes 0.0 <=0.4 10e3/uL    Absolute NRBCs 0.0 10e3/uL   Glucose by meter     Status: Abnormal   Result Value Ref Range    GLUCOSE BY METER POCT 356 (H) 70 - 99 mg/dL   Glucose  by meter     Status: Abnormal   Result Value Ref Range    GLUCOSE BY METER POCT 209 (H) 70 - 99 mg/dL   Lactic acid whole blood     Status: Abnormal   Result Value Ref Range    Lactic Acid 2.7 (H) 0.7 - 2.0 mmol/L   Lactic acid whole blood     Status: Normal   Result Value Ref Range    Lactic Acid 2.0 0.7 - 2.0 mmol/L   Glucose by meter     Status: Abnormal   Result Value Ref Range    GLUCOSE BY METER POCT 219 (H) 70 - 99 mg/dL   Glucose by meter     Status: Abnormal   Result Value Ref Range    GLUCOSE BY METER POCT 210 (H) 70 - 99 mg/dL   Comprehensive metabolic panel     Status: Abnormal   Result Value Ref Range    Sodium 144 136 - 145 mmol/L    Potassium 4.2 3.4 - 5.3 mmol/L    Chloride 110 (H) 98 - 107 mmol/L    Carbon Dioxide (CO2) 21 (L) 22 - 29 mmol/L    Anion Gap 13 7 - 15 mmol/L    Urea Nitrogen 16.0 8.0 - 23.0 mg/dL    Creatinine 1.25 (H) 0.51 - 0.95 mg/dL    Calcium 8.2 (L) 8.8 - 10.2 mg/dL    Glucose 149 (H) 70 - 99 mg/dL    Alkaline Phosphatase 111 (H) 35 - 104 U/L    AST 29 0 - 45 U/L    ALT 24 0 - 50 U/L    Protein Total 5.3 (L) 6.4 - 8.3 g/dL    Albumin 3.1 (L) 3.5 - 5.2 g/dL    Bilirubin Total 0.3 <=1.2 mg/dL    GFR Estimate 48 (L) >60 mL/min/1.73m2   Glucose by meter     Status: Normal   Result Value Ref Range    GLUCOSE BY METER POCT 88 70 - 99 mg/dL   Glucose by meter     Status: Normal   Result Value Ref Range    GLUCOSE BY METER POCT 76 70 - 99 mg/dL   Glucose by meter     Status: Normal   Result Value Ref Range    GLUCOSE BY METER POCT 88 70 - 99 mg/dL   Glucose by meter     Status: Normal   Result Value Ref Range    GLUCOSE BY METER POCT 95 70 - 99 mg/dL   Glucose by meter     Status: Abnormal   Result Value Ref Range    GLUCOSE BY METER POCT 130 (H) 70 - 99 mg/dL   CBC with platelets and differential     Status: Abnormal   Result Value Ref Range    WBC Count 4.4 4.0 - 11.0 10e3/uL    RBC Count 3.49 (L) 3.80 - 5.20 10e6/uL    Hemoglobin 10.3 (L) 11.7 - 15.7 g/dL    Hematocrit 32.4 (L) 35.0 -  47.0 %    MCV 93 78 - 100 fL    MCH 29.5 26.5 - 33.0 pg    MCHC 31.8 31.5 - 36.5 g/dL    RDW 15.5 (H) 10.0 - 15.0 %    Platelet Count 100 (L) 150 - 450 10e3/uL    % Neutrophils 51 %    % Lymphocytes 36 %    % Monocytes 7 %    % Eosinophils 3 %    % Basophils 1 %    % Immature Granulocytes 2 %    NRBCs per 100 WBC 0 <1 /100    Absolute Neutrophils 2.3 1.6 - 8.3 10e3/uL    Absolute Lymphocytes 1.6 0.8 - 5.3 10e3/uL    Absolute Monocytes 0.3 0.0 - 1.3 10e3/uL    Absolute Eosinophils 0.1 0.0 - 0.7 10e3/uL    Absolute Basophils 0.0 0.0 - 0.2 10e3/uL    Absolute Immature Granulocytes 0.1 <=0.4 10e3/uL    Absolute NRBCs 0.0 10e3/uL   RBC and Platelet Morphology     Status: None   Result Value Ref Range    Platelet Assessment  Automated Count Confirmed. Platelet morphology is normal.     Automated Count Confirmed. Platelet morphology is normal.    RBC Morphology Confirmed RBC Indices    Glucose by meter     Status: Abnormal   Result Value Ref Range    GLUCOSE BY METER POCT 310 (H) 70 - 99 mg/dL   Urine Culture     Status: Abnormal (Preliminary result)    Specimen: Urine, NOS   Result Value Ref Range    Culture >100,000 CFU/mL Escherichia coli (A)    CBC with platelets differential     Status: None    Narrative    The following orders were created for panel order CBC with platelets differential.  Procedure                               Abnormality         Status                     ---------                               -----------         ------                     CBC with platelets and d...[[id number]]                      Final result                 Please view results for these tests on the individual orders.   CBC with Platelets & Differential     Status: Abnormal    Narrative    The following orders were created for panel order CBC with Platelets & Differential.  Procedure                               Abnormality         Status                     ---------                               -----------         ------                      CBC with platelets and d...[[id number]]  Abnormal            Final result                 Please view results for these tests on the individual orders.   CBC with Platelets & Differential     Status: Abnormal    Narrative    The following orders were created for panel order CBC with Platelets & Differential.  Procedure                               Abnormality         Status                     ---------                               -----------         ------                     CBC with platelets and d...[[id number]]  Abnormal            Final result               RBC and Platelet Morphology[[id number]]                      Final result                 Please view results for these tests on the individual orders.      General: Awake, alert, no acute distress  HEENT: NCAT  Cardiac: normal rate and regular rhythm, no murmurs  Respiratory: clear to ascultation bilaterally, no increased work of breathing  GI: Soft, non-tender, non-distended, BS+  Neuro: Alert and oriented to self and place but not time or situation.  Moving all extremities spontaneously, able to speak in short/simple phrases.  MSK: No lower extremity edema    Signed:  Odalys Villa, AGACNP-BC, NP  [phone] Ex [id number]  June 18, 2023 at 10:43 AM

## 2023-06-18 NOTE — PLAN OF CARE
Goal Outcome Evaluation:      Plan of Care Reviewed With: patient, caregiver      Pt has finally discharged to home with family.

## 2023-06-18 NOTE — PROGRESS NOTES
Lactic acid 2.7 at 8:55am. Bolus 1L given. Repeat done at 19:02pm and still 2.7. 1L NS given and was repeated an hour later and Lactate down to 2.0. Will repeat in AM    At 3am patients BG was noted to be 80. BG repeated in 30 minutes and was 76. In the interim patient was sleeping and was not awake enough to drink. Sitter reports patient took a few sips of orange juice. Due to trend decision was made to give patient D50 12.5ml IV x 1. BG repeated after an hour and was 88. Patient in deep sleep and no arousable to drink any juice. Will change IVF from NS to D5NS briefly until awake and eating or BG trending up.     In AM patient continues to be difficult to arouse. Though she makes sounds to respond to name and call. She is able to squeeze my fingers. Though she didn't sleep at all night prior and could very likely be just patient being very groggy, etiology of elevated lactic acid and intermittent hypoglycemia     Jayesh Nix PA-C

## 2023-06-18 NOTE — PROGRESS NOTES
Inpatient Diabetes consult service ([facility])    Assessment/plan  1.  DM type I: Poorly controlled hemoglobin A1c 10.8 on 5/20/2023:  Received Lantus 18 units yesterday at the noon time was on fixed doses of NovoLog in the morning received with the breakfast 4 units became hyperglycemic up to 356 however developed low BG over the night.    Recommendations:  -Reduce dose of Lantus 14 units to be started today.  -Continue carb coverage of 1: 20 g CHO with meals and snacks.  -Continue medium dose SSI 3 times daily before meals and at the bedtime.  -POC BG check 3 times daily AC at the bedtime and 2 AM.  -Hypoglycemia rescue protocol.    Discharge plan:  Plan for discharge today to be discharged on:  Lantus 14 units  NovoLog 4 units with meals (to hold if not eating)+ medium dose sliding scale.  We talked to her daughter Vishal today and we communicated the discharge plan with her she is going to take over in her diabetes management with checking her BG 4 times daily and giving her insulin doses.  She has upcoming appointment at [facility] with Mai Figueroa on 7/18/2023, we will contact her to check if she can get sooner video visit appointment.          Chief complaint: Nausea and vomiting with generalized weakness    HPI  Isabell Matthews is a 62 year old female with background history of DM type I, CKD stage III, hypothyroidism, dementia, seizure and CVA was discharged from the hospital on 6/15 following an admission with the DKA.  Readmitted to the hospital on 6/16 with persistent nausea vomiting and generalized weakness.      Was seen by the diabetes service on the last admission, was discharged from the hospital on 6/14/2023 had DKA on the presentation.  Was discharged on Lantus 18 units, aspart 4 units with meals, aspart medium dose correction scale.    On the readmission no evidence of DKA.  Following admission was restarted on Lantus 18 units, medium dose SSI and NovoLog 4 units 3 times daily AC.    Total  daily insulin:  6/16 NovoLog 10 units, Lantus 18 units  6/17: NovoLog 25 units, Lantus 18 units  Ordered DM relevant regimen  Lantus 14 units  NovoLog carb coverage of 1: 20 g CHO.  Medium dose SSI    Active Diet Order  Orders Placed This Encounter      Pureed Diet (level 4) Thin Liquids (level 0)    Add tube feeding or TPN , none    Recent Labs   Lab 06/18/23  0626 06/18/23  0527 06/18/23  0342 06/18/23  0254 06/17/23  2240 06/17/23  2159   GLC 95 88 76 88 210* 219*         Past DM history:   Diabetes type: DM type 1  Diabetes Duration:  age of 38 y  Usual Diabetes Regimen:  Lantus 18 units, NovoLog 4 units 3 times daily AC, medium dose SSI  Ability to [place] Prescribed Regimen:  No  Diabetes Control:  Poorly controlled hemoglobin A1c 10.8.  Diabetes Complications: retinopathy - +  Peripheral neuropathy +  Nephropathy -   History of DKA:  Has history of DKA  Able to Detect Hypoglycemia:   Usual Diabetes Care Provider:  Carolina Esteves however no show for the last 2 visits last seen in 2020 by Shira.  Factors Impacting Glucose Control:  Has advanced dementia not able to manage her diabetes without help.    Exam  BP (!) 152/76 (BP Location: Left arm)   Pulse 71   Temp 98  F (36.7  C) (Oral)   Resp 16   SpO2 96%   GENERAL: no distress  SKIN: Visible skin clear. No significant rash, abnormal pigmentation or lesions.  EYES: Eyes grossly normal to inspection.  No discharge or erythema, or obvious scleral/conjunctival abnormalities.  NECK: no visible masses; no grossly visible goiter  RESP: No audible wheeze, cough, or visible cyanosis.  No visible retractions or increased work of breathing.    NEURO: Cranial nerves grossly intact.  Awake, alert, responds appropriately to questions.  Mentation and speech fluent.  PSYCH:affect normal, judgement and insight intact, and appearance well-groomed.      DATA    Latest Reference Range & Units 06/18/23 07:45   Sodium 136 - 145 mmol/L 144   Potassium 3.4 - 5.3 mmol/L 4.2    Chloride 98 - 107 mmol/L 110 (H)   Carbon Dioxide (CO2) 22 - 29 mmol/L 21 (L)   Urea Nitrogen 8.0 - 23.0 mg/dL 16.0   Creatinine 0.51 - 0.95 mg/dL 1.25 (H)   GFR Estimate >60 mL/min/1.73m2 48 (L)   Calcium 8.8 - 10.2 mg/dL 8.2 (L)   Anion Gap 7 - 15 mmol/L 13   (H): Data is abnormally high  (L): Data is abnormally low  [facility]     Endocrinology diabetes and metabolism  fellow   Pager number: [phone]        I have seen and examined the patient, reviewed records, reviewed and edited the fellow's note.  I agree with the plan of care.    Sarbjit Ward MD   Division of Diabetes, Endocrinology and Metabolism  Department of Medicine

## 2023-06-18 NOTE — PLAN OF CARE
Goal Outcome Evaluation:           Overall Patient Progress: no changeOverall Patient Progress: no change    Observation goals  -diagnostic tests and consults completed and resulted - Pending  -vital signs normal or at patient baseline -improving, BP still elevated. LA. Downtrending.   -tolerating oral intake to maintain hydration- improving. Slow with eating but good appetite.   -adequate pain control on oral analgesics  -Met  -safe disposition plan has been identified - Not met    Patients mentation waxes wanes, alert but oriented to self only at bedtime. Overnight much more lethargic. This morning, patient was unarousable, would respond only to vigorous stimulation. Orders placed for CT, not 5 minutes after, patient woke and walked to the bathroom to change brief, patient now alert and oriented x4 and pleasant. CT orders discontinued. BGs lower overnight, IV dextrose given once without much improvement. Placed on d5 in NS briefly d/t not Taking PO applejuice. Now back on cont. NS @ 125ml/hr, patient drinking apple juice and eating yogurt. Seemed to be behavioral/very tired overnight but is now responsive, Axox4, following commands, pleasant in bed. Cont. Plan of care.

## 2023-06-18 NOTE — DISCHARGE SUMMARY
Discharge Summary    Isabell Matthews MRN# [id number]   YOB: 1960 Age: 62 year old     Date of Admission:  6/16/2023  Date of Discharge:  6/18/2023  Admitting Physician:  Destinee Castro PA-C  Discharge Physician:  Dr. Hahn  Discharging Service:  Emergency Department Observation Unit     Primary Provider: Bony Castaneda          Discharge Diagnosis:       Cognitive decline    Nausea and vomiting, unspecified vomiting type    * No resolved hospital problems. *               Discharge Disposition:     Discharged to home           Condition on Discharge:     Discharge condition: Stable   Code status on discharge: DNR / DNI           Procedures:       Imaging performed:  CT abdomen, a very small amount of nonspecific free fluid in the pelvis. No other convincing cause of acute pain identified in the abdomen or pelvis. No evidence of bowel obstruction. Colonic diverticulosis without evidence of diverticulitis                Discharge Medications:     Current Discharge Medication List        CONTINUE these medications which have NOT CHANGED    Details   !! Vitamin D3 (CHOLECALCIFEROL) 125 MCG (5000 UT) tablet Take 125 mcg by mouth daily      acetaminophen (TYLENOL) 500 MG tablet Take 500 mg by mouth every 6 hours as needed for mild pain    Associated Diagnoses: Type 1 diabetes mellitus with other circulatory complication (H)      aspirin (ASA) 81 MG chewable tablet Take 1 tablet (81 mg) by mouth daily  Qty: 90 tablet, Refills: 3    Associated Diagnoses: Acute CVA (cerebrovascular accident) (H)      atorvastatin (LIPITOR) 40 MG tablet Take 1 tablet (40 mg) by mouth daily  Qty: 90 tablet, Refills: 3    Associated Diagnoses: Hyperlipidemia LDL goal <70      blood glucose monitoring (NO BRAND SPECIFIED) meter device kit Use to test blood sugar 4 times daily.  Please provide glucose meter that is covered by insurance.  Qty: 1 kit, Refills: 0    Associated Diagnoses: Type 1 diabetes mellitus with other  circulatory complication (H); Type 1 diabetes mellitus with diabetic polyneuropathy (H)      carvedilol (COREG) 12.5 MG tablet Take 1 tablet (12.5 mg) by mouth 2 times daily (with meals)  Qty: 180 tablet, Refills: 3    Associated Diagnoses: Essential hypertension      clopidogrel (PLAVIX) 75 MG tablet 1 tablet (75 mg) by Oral or NG Tube route daily  Qty: 19 tablet, Refills: 0    Associated Diagnoses: Cerebrovascular accident (CVA), unspecified mechanism (H)      cyanocobalamin (VITAMIN B-12) 1000 MCG tablet Take 1 tablet (1,000 mcg) by mouth daily  Qty: 100 tablet, Refills: 3    Associated Diagnoses: Vitamin B12 deficiency (non anemic)      diclofenac (VOLTAREN) 1 % topical gel Apply 2 g topically 4 times daily as needed for moderate pain  Qty: 150 g, Refills: 1    Associated Diagnoses: Pain in joint, ankle and foot, right      doxazosin (CARDURA) 1 MG tablet Take 1 tablet (1 mg) by mouth At Bedtime  Qty: 90 tablet, Refills: 3    Associated Diagnoses: Left-sided nontraumatic intracerebral hemorrhage, unspecified cerebral location (H); Essential hypertension      DULoxetine (CYMBALTA) 20 MG capsule Take 1 capsule (20 mg) by mouth daily  Qty: 90 capsule, Refills: 3    Associated Diagnoses: Fibromyalgia      gabapentin (NEURONTIN) 100 MG capsule Take 1 capsule (100 mg) by mouth At Bedtime  Qty: 90 capsule, Refills: 1    Associated Diagnoses: Type 1 diabetes mellitus with other circulatory complication (H)      !! insulin aspart (NOVOLOG PEN) 100 UNIT/ML pen Inject 4 Units Subcutaneous 3 times daily (with meals)  Qty: 15 mL, Refills: 0      !! insulin aspart (NOVOLOG PEN) 100 UNIT/ML pen Inject 1-6 Units Subcutaneous 3 times daily (with meals) Correction Scale - MEDIUM INSULIN RESISTANCE DOSING     Do Not give Correction Insulin if BG less than 140.  For  - 189 give 1 unit.  For  - 239 give 2 units.  For  - 289 give 3 units.  For  - 339 give 4 units.  For  - 399 give 5 units.  For BG  greater than or equal to 400 give 6 units.  Check blood glucose 3 times daily with meals and administer based on blood glucose.  Notify provider if glucose greater than or equal to 350 mg/dL after administration of correction dose.  If given at mealtime, administer within 30 minutes of start of meal.  Qty: 15 mL      insulin glargine (LANTUS PEN) 100 UNIT/ML pen Inject 22 Units Subcutaneous every 24 hours  Qty: 15 mL    Comments: If Lantus is not covered by insurance, may substitute Basaglar or Semglee or other insulin glargine product per insurance preference at same dose and frequency.        insulin pen needle (31G X 8 MM) 31G X 8 MM miscellaneous Use 4 pen needles daily or as directed.  Qty: 300 each, Refills: 4    Associated Diagnoses: History of insulin dependent diabetes mellitus      levETIRAcetam (KEPPRA) 500 MG tablet Take 1 tablet (500 mg) by mouth 2 times daily  Qty: 180 tablet, Refills: 3    Associated Diagnoses: Seizures (H)      loratadine (CLARITIN) 10 mg tablet [LORATADINE (CLARITIN) 10 MG TABLET] Take 10 mg by mouth daily.      melatonin 10 mg Tab Take 10 mg by mouth nightly as needed      METHYLCELLULOSE, LAXATIVE, PO Take 1 Tablespoonful by mouth daily as needed      nystatin (MYCOSTATIN) 374031 UNIT/GM external cream Apply topically 2 times daily Until rash clear  Qty: 30 g, Refills: 1    Associated Diagnoses: Dermatitis      ondansetron (ZOFRAN ODT) 4 MG ODT tab Take 1 tablet (4 mg) by mouth every 8 hours as needed for nausea  Qty: 30 tablet, Refills: 0    Associated Diagnoses: Nausea      prochlorperazine (COMPAZINE) 10 MG tablet Take 1 tablet (10 mg) by mouth every 6 hours as needed for nausea or vomiting  Qty: 30 tablet, Refills: 0    Associated Diagnoses: Nausea      SYNTHROID 75 MCG tablet Take 1 tablet (75 mcg) by mouth daily  Qty: 90 tablet, Refills: 3    Comments: Discontinue previous dose  Associated Diagnoses: Acquired hypothyroidism      TRUE METRIX BLOOD GLUCOSE TEST test strip USE  TO TEST BLOOD SUGAR 4 TO 5 TIMES DAILY OR AS DIRECTED  Qty: 400 strip, Refills: 3    Associated Diagnoses: Type 1 diabetes mellitus with other circulatory complication (H)      !! Vitamin D3 (CHOLECALCIFEROL) 25 mcg (1000 units) tablet Take 1 tablet (25 mcg) by mouth daily  Qty: 100 tablet, Refills: 3    Associated Diagnoses: Benign essential hypertension      zinc oxide (DESITIN) 20 % external ointment Apply topically as needed for dry skin or irritation  Qty: 85 g, Refills: 3    Associated Diagnoses: Fecal smearing       !! - Potential duplicate medications found. Please discuss with provider.                Consultations:     Endocrinology             Brief History of Illness:   Isabell Matthews is a 62 year old female with a past medical history of HTN, HLD, type I diabetes mellitus, CKD stage 3b, hyperglycemia, hyperthyroidism, dementia, seizures, and previous stroke. She presents to the ED with weakness, nausea and vomiting.           Hospital Course:     #T1DM  #Generalized weakness  #Persistent nausea and vomiting  # UTI   Patient is a poor historian, family unreachable. History mainly provided by medical records. Per chart review, patient was recently hospitalized for DKA and was discharged yesterday 6/15 at around 5pm. Per ED note, patient's daughter reported that patient continued to have episodes of emesis after leaving the hospital. As result, she called triage and was instructed to return the patient to the ER. Patient on insulin, but it is unclear if she is complaint given her cognitive decline 2/2 vascular vs alzheimer's. Hx of multiple hospitalizations for DKA. She was seen by endocrine in this last admission and was discharged on lantus 18 units q 24hr, aspart 4 units TID with meals, and aspart medium correction scale. Upon arrival in the ED, she was noted to be nauseous. She was given IV antiemetics and IVF with mild improvement. No signs of DKA with AG serum ketones being wnl. Alk phos mildly  elevated at 147, AST and ALT, and lipase wnl. No indication of infection process, lactic acid wnl, no leukocytosis. HGB stable at 12.4. VBG edwin, no signs of acidosis. Covid19/influnzea  A/B negative. CT abdomen, a very small amount of nonspecific free fluid in the pelvis. No other convincing cause of acute pain identified in the abdomen or pelvis. No evidence of bowel obstruction. Colonic diverticulosis without evidence of diverticulitis. Patient is admitted to ed observation unit for n/v management and PT assessment. Patient was able to tolerate breakfast this morning. Endocrinology consulted and recommended the following regimen for discharge: Lantus 14 units  NovoLog 4 units with meals (to hold if not eating)+ medium dose sliding scale. We will try to contact her daughters Felton and Vishal today no answer .She needs follow-up postdischarge at [facility] with Carolina Esteves, will send message for arranging the appointment.           Recent Labs   Lab 06/18/23  0949 06/18/23  0745 06/18/23  0703 06/18/23  0626 06/18/23  0527 06/18/23  0342   * 149* 130* 95 88 76        UA with negative ketones, moderate LE, WBC 43. UC positive for > 100,000 pending sensitivities.  Treating UTI wiith ceftriaxone (6/17-6/18) and was switched to cefpodoxime 200 mg BID for five days (6/19 until 6/25).      PT assessed patient and recommended home with home care     # Elevated Lactic Acidosis - IV 1 liter bolus x 2. Improved after 2.0     #Seizures  #CVA  She has a past medical history significant for strokes, with recent CTA showing critical M1/M2 stenosis and most recent hospitalization in May 2023 at neurology service. She does not complain of headache today. She does have delayed speech, but that was noted in previous admission as well. The patient is essentially aphasic but with intact comprehension and able to follow command. Patient noted to be pocketing bills and holding food in her mouth. She did have SLP  evaluation Recommend the pt continue her baseline diet of puree (IDDSI 4) and thin liquids with 1:1 assistance. Ensure the pt is upright for all PO. Check oral cavity for pocketing and cue the pt to swallow. Keppra level 7.8. Discussed with neurology Unsure if patient is taking medication consistently. Will continue home dosing. Recheck Keppra level at next neurology appointment. (will need this set up, referral sent)    - PTA Aspirin 81 mg daily  - PTA Plavix 75 mg daily   - PTA Keppra 500 mg BID   - PTA Atorvastatin 40 mg daily         #HTN - BP still elevated at 192/93. Received IV Hydralazine overnight.   - IV Hydralazine prn.   - Add Norvasc 5 mg daily  - PTA Carvedilol 12.5 mg BID   - PTA Doxazosin 1 mg HS      #CKD  Cr 1.25. Baseline creatinine ~1.7.   - Avoid nephrotoxic agents     #Hypothyroidism  - PTA levothyroxin 75 mg daily      #Dementia   2/2 Vascular vs Alzheimer's.  On her 2021 MRI, no signs suggestive of CVA.  She did have microhemorrhages on SWI, but all of these were deep.     #Deprssion:  - PTA Duloxetine 20 mg daily               Final Day of Progress before Discharge:       Physical Exam:  Blood pressure (!) 154/99, pulse 81, temperature 96.9  F (36.1  C), temperature source Axillary, resp. rate 18, SpO2 97 %, not currently breastfeeding.    EXAM:  General: Awake, alert, no acute distress  HEENT: NCAT  Cardiac: normal rate and regular rhythm, no murmurs  Respiratory: clear to ascultation bilaterally, no increased work of breathing  GI: Soft, non-tender, non-distended, BS+  Neuro: Alert and oriented to self and place but not time or situation.  Moving all extremities spontaneously, able to speak in short/simple phrases.  MSK: No lower extremity edema            Data:  All laboratory data reviewed             Significant Results:     None  Results for orders placed or performed during the hospital encounter of 06/16/23   CT Abdomen Pelvis w Contrast     Status: None    Narrative    EXAM: CT  ABDOMEN AND PELVIS WITH CONTRAST  LOCATION: [facility]  DATE/TIME: 6/16/2023 3:47 AM CDT    INDICATION: Left lower quadrant pain. Persistent nausea and vomiting.  COMPARISON: 04/10/2022 - CT chest, abdomen and pelvis.    TECHNIQUE: CT scan of the abdomen and pelvis was performed following injection of IV contrast. Multiplanar reformats were obtained. Dose reduction techniques were used.  CONTRAST: 80 mL Isovue 370.    FINDINGS:    LOWER CHEST: A trace amount of bilateral pleural fluid. Coronary artery calcification.    HEPATOBILIARY: Unremarkable.    SPLEEN: Unremarkable.    PANCREAS: Unremarkable.    ADRENAL GLANDS: Unremarkable.    KIDNEYS/BLADDER: Moderate multifocal bilateral renal atrophy. A 3.0 x 2.0 cm mildly heterogeneous low-attenuation region within the central aspect of the upper pole of the left kidney (series 5 image 126). This is nonspecific and not well characterized   on this study, but has decreased in size since the comparison study dated 04/10/2022 on which it measured 3.6 x 3.2 cm.    BOWEL: No dilatation of the small or large bowel. A few colonic diverticula are present, without evidence of diverticulitis. The appendix is not visualized. No visualized bowel wall thickening, pneumatosis or free intraperitoneal gas.    LYMPH NODES: Unremarkable.    PELVIC ORGANS: 5.0 x 3.2 cm relatively homogeneous intermediate attenuation ovoid structure in the right hemipelvis, abutting the uterus (series 4 image 307). On the comparison study dated 04/10/2022, this appeared to be an exophytic uterine fibroid.    MUSCULOSKELETAL: Partial visualization of bilateral hip arthroplasties.    OTHER: A very small amount of free fluid in the pelvis.      Impression    IMPRESSION:   1.  A very small amount of nonspecific free fluid in the pelvis.  2.  No other convincing cause of acute pain identified in the abdomen or pelvis. No evidence of bowel obstruction.  3.  Colonic  diverticulosis without evidence of diverticulitis.                 Ketone Beta-Hydroxybutyrate Quantitative     Status: Normal   Result Value Ref Range    Ketone (Beta-Hydroxybutyrate) Quantitative 0.30 <=0.30 mmol/L   Blood gas venous     Status: Abnormal   Result Value Ref Range    pH Venous 7.35 7.32 - 7.43    pCO2 Venous 49 40 - 50 mm Hg    pO2 Venous 21 (L) 25 - 47 mm Hg    Bicarbonate Venous 27 21 - 28 mmol/L    Base Excess/Deficit (+/-) 0.5 -7.7 - 1.9 mmol/L    FIO2 21    Comprehensive metabolic panel     Status: Abnormal   Result Value Ref Range    Sodium 135 (L) 136 - 145 mmol/L    Potassium 4.2 3.4 - 5.3 mmol/L    Chloride 102 98 - 107 mmol/L    Carbon Dioxide (CO2) 20 (L) 22 - 29 mmol/L    Anion Gap 13 7 - 15 mmol/L    Urea Nitrogen 17.3 8.0 - 23.0 mg/dL    Creatinine 1.44 (H) 0.51 - 0.95 mg/dL    Calcium 9.1 8.8 - 10.2 mg/dL    Glucose 236 (H) 70 - 99 mg/dL    Alkaline Phosphatase 147 (H) 35 - 104 U/L    AST 37 0 - 45 U/L    ALT 35 0 - 50 U/L    Protein Total 6.4 6.4 - 8.3 g/dL    Albumin 3.7 3.5 - 5.2 g/dL    Bilirubin Total 0.3 <=1.2 mg/dL    GFR Estimate 40 (L) >60 mL/min/1.73m2   Lactic acid whole blood     Status: Normal   Result Value Ref Range    Lactic Acid 1.6 0.7 - 2.0 mmol/L   Magnesium     Status: Abnormal   Result Value Ref Range    Magnesium 2.5 (H) 1.7 - 2.3 mg/dL   CBC with platelets and differential     Status: None   Result Value Ref Range    WBC Count 5.0 4.0 - 11.0 10e3/uL    RBC Count 4.19 3.80 - 5.20 10e6/uL    Hemoglobin 12.4 11.7 - 15.7 g/dL    Hematocrit 39.2 35.0 - 47.0 %    MCV 94 78 - 100 fL    MCH 29.6 26.5 - 33.0 pg    MCHC 31.6 31.5 - 36.5 g/dL    RDW 14.6 10.0 - 15.0 %    Platelet Count 167 150 - 450 10e3/uL    % Neutrophils 59 %    % Lymphocytes 34 %    % Monocytes 5 %    % Eosinophils 2 %    % Basophils 0 %    % Immature Granulocytes 0 %    NRBCs per 100 WBC 0 <1 /100    Absolute Neutrophils 3.0 1.6 - 8.3 10e3/uL    Absolute Lymphocytes 1.7 0.8 - 5.3 10e3/uL    Absolute  Monocytes 0.3 0.0 - 1.3 10e3/uL    Absolute Eosinophils 0.1 0.0 - 0.7 10e3/uL    Absolute Basophils 0.0 0.0 - 0.2 10e3/uL    Absolute Immature Granulocytes 0.0 <=0.4 10e3/uL    Absolute NRBCs 0.0 10e3/uL   Extra Tube     Status: None    Narrative    The following orders were created for panel order Extra Tube.  Procedure                               Abnormality         Status                     ---------                               -----------         ------                     Extra Blue Top Tube[[id number]]                              Final result               Extra Red Top Tube[[id number]]                               Final result                 Please view results for these tests on the individual orders.   Extra Blue Top Tube     Status: None   Result Value Ref Range    Hold Specimen JIC    Extra Red Top Tube     Status: None   Result Value Ref Range    Hold Specimen JIC    Glucose by meter     Status: Abnormal   Result Value Ref Range    GLUCOSE BY METER POCT 214 (H) 70 - 99 mg/dL   Lipase     Status: Normal   Result Value Ref Range    Lipase 27 13 - 60 U/L   Asymptomatic Influenza A/B, RSV, & SARS-CoV2 PCR (COVID-19) Nose     Status: Normal    Specimen: Nose; Swab   Result Value Ref Range    Influenza A PCR Negative Negative    Influenza B PCR Negative Negative    RSV PCR Negative Negative    SARS CoV2 PCR Negative Negative    Narrative    Testing was performed using the Xpert Xpress CoV2/Flu/RSV Assay on the Cepheid GeneXpert Instrument. This test should be ordered for the detection of SARS-CoV-2, influenza, and RSV viruses in individuals who meet clinical and/or epidemiological criteria. Test performance is unknown in asymptomatic patients. This test is for in vitro diagnostic use under the FDA EUA for laboratories certified under CLIA to perform high or moderate complexity testing. This test has not been FDA cleared or approved. A negative result does not rule out the presence of PCR inhibitors in  the specimen or target RNA in concentration below the limit of detection for the assay. If only one viral target is positive but coinfection with multiple targets is suspected, the sample should be re-tested with another FDA cleared, approved, or authorized test, if coinfection would change clinical management. This test was validated by the [facility] [employer] [facility]. These laboratories are certified under the Clinical Laboratory Improvement Amendments of 1988 (CLIA-88) as qualified to perform high complexity laboratory testing.   UA with Microscopic reflex to Culture     Status: Abnormal    Specimen: Urine, NOS   Result Value Ref Range    Color Urine Light Yellow Colorless, Straw, Light Yellow, Yellow    Appearance Urine Slightly Cloudy (A) Clear    Glucose Urine Negative Negative mg/dL    Bilirubin Urine Negative Negative    Ketones Urine Negative Negative mg/dL    Specific Gravity Urine 1.014 1.003 - 1.035    Blood Urine Negative Negative    pH Urine 5.5 5.0 - 7.0    Protein Albumin Urine Negative Negative mg/dL    Urobilinogen Urine Normal Normal, 2.0 mg/dL    Nitrite Urine Negative Negative    Leukocyte Esterase Urine Moderate (A) Negative    Bacteria Urine Few (A) None Seen /HPF    Mucus Urine Present (A) None Seen /LPF    RBC Urine 0 <=2 /HPF    WBC Urine 43 (H) <=5 /HPF    Squamous Epithelials Urine 2 (H) <=1 /HPF    Narrative    Urine Culture ordered based on laboratory criteria   CBC with platelets     Status: Abnormal   Result Value Ref Range    WBC Count 4.9 4.0 - 11.0 10e3/uL    RBC Count 3.37 (L) 3.80 - 5.20 10e6/uL    Hemoglobin 10.0 (L) 11.7 - 15.7 g/dL    Hematocrit 31.4 (L) 35.0 - 47.0 %    MCV 93 78 - 100 fL    MCH 29.7 26.5 - 33.0 pg    MCHC 31.8 31.5 - 36.5 g/dL    RDW 14.7 10.0 - 15.0 %    Platelet Count 138 (L) 150 - 450 10e3/uL   Comprehensive metabolic panel     Status: Abnormal   Result Value Ref Range    Sodium 141 136 - 145 mmol/L    Potassium 3.8 3.4 - 5.3 mmol/L    Chloride 109 (H)  98 - 107 mmol/L    Carbon Dioxide (CO2) 21 (L) 22 - 29 mmol/L    Anion Gap 11 7 - 15 mmol/L    Urea Nitrogen 16.4 8.0 - 23.0 mg/dL    Creatinine 1.42 (H) 0.51 - 0.95 mg/dL    Calcium 8.3 (L) 8.8 - 10.2 mg/dL    Glucose 241 (H) 70 - 99 mg/dL    Alkaline Phosphatase 129 (H) 35 - 104 U/L    AST 29 0 - 45 U/L    ALT 31 0 - 50 U/L    Protein Total 5.8 (L) 6.4 - 8.3 g/dL    Albumin 3.3 (L) 3.5 - 5.2 g/dL    Bilirubin Total 0.2 <=1.2 mg/dL    GFR Estimate 41 (L) >60 mL/min/1.73m2   Glucose by meter     Status: Abnormal   Result Value Ref Range    GLUCOSE BY METER POCT 173 (H) 70 - 99 mg/dL   Glucose by meter     Status: Abnormal   Result Value Ref Range    GLUCOSE BY METER POCT 172 (H) 70 - 99 mg/dL   Keppra (Levetiracetam) Level     Status: Abnormal   Result Value Ref Range    Keppra (Levetiracetam) Level 7.8 (L) 10.0 - 40.0  g/mL   Glucose by meter     Status: Abnormal   Result Value Ref Range    GLUCOSE BY METER POCT 61 (L) 70 - 99 mg/dL   Glucose by meter     Status: Abnormal   Result Value Ref Range    GLUCOSE BY METER POCT 151 (H) 70 - 99 mg/dL   Glucose by meter     Status: Abnormal   Result Value Ref Range    GLUCOSE BY METER POCT 102 (H) 70 - 99 mg/dL   Comprehensive metabolic panel     Status: Abnormal   Result Value Ref Range    Sodium 135 (L) 136 - 145 mmol/L    Potassium 4.3 3.4 - 5.3 mmol/L    Chloride 104 98 - 107 mmol/L    Carbon Dioxide (CO2) 17 (L) 22 - 29 mmol/L    Anion Gap 14 7 - 15 mmol/L    Urea Nitrogen 16.8 8.0 - 23.0 mg/dL    Creatinine 1.33 (H) 0.51 - 0.95 mg/dL    Calcium 8.7 (L) 8.8 - 10.2 mg/dL    Glucose 354 (H) 70 - 99 mg/dL    Alkaline Phosphatase 116 (H) 35 - 104 U/L    AST 33 0 - 45 U/L    ALT 23 0 - 50 U/L    Protein Total 5.5 (L) 6.4 - 8.3 g/dL    Albumin 3.1 (L) 3.5 - 5.2 g/dL    Bilirubin Total 0.2 <=1.2 mg/dL    GFR Estimate 44 (L) >60 mL/min/1.73m2   Ketone Beta-Hydroxybutyrate Quantitative     Status: Normal   Result Value Ref Range    Ketone (Beta-Hydroxybutyrate) Quantitative  0.20 <=0.30 mmol/L   Lactic acid whole blood     Status: Abnormal   Result Value Ref Range    Lactic Acid 2.7 (H) 0.7 - 2.0 mmol/L   Glucose by meter     Status: Abnormal   Result Value Ref Range    GLUCOSE BY METER POCT 39 (LL) 70 - 99 mg/dL   Glucose by meter     Status: Abnormal   Result Value Ref Range    GLUCOSE BY METER POCT 34 (LL) 70 - 99 mg/dL   Glucose by meter     Status: Abnormal   Result Value Ref Range    GLUCOSE BY METER POCT 161 (H) 70 - 99 mg/dL   Glucose by meter     Status: Abnormal   Result Value Ref Range    GLUCOSE BY METER POCT 111 (H) 70 - 99 mg/dL   Magnesium     Status: Abnormal   Result Value Ref Range    Magnesium 2.6 (H) 1.7 - 2.3 mg/dL   Phosphorus     Status: Normal   Result Value Ref Range    Phosphorus 3.6 2.5 - 4.5 mg/dL   Glucose by meter     Status: Abnormal   Result Value Ref Range    GLUCOSE BY METER POCT 259 (H) 70 - 99 mg/dL   Glucose by meter     Status: Abnormal   Result Value Ref Range    GLUCOSE BY METER POCT 341 (H) 70 - 99 mg/dL   CBC with platelets and differential     Status: Abnormal   Result Value Ref Range    WBC Count 5.0 4.0 - 11.0 10e3/uL    RBC Count 3.59 (L) 3.80 - 5.20 10e6/uL    Hemoglobin 10.6 (L) 11.7 - 15.7 g/dL    Hematocrit 33.2 (L) 35.0 - 47.0 %    MCV 93 78 - 100 fL    MCH 29.5 26.5 - 33.0 pg    MCHC 31.9 31.5 - 36.5 g/dL    RDW 14.8 10.0 - 15.0 %    Platelet Count 151 150 - 450 10e3/uL    % Neutrophils 56 %    % Lymphocytes 35 %    % Monocytes 5 %    % Eosinophils 2 %    % Basophils 1 %    % Immature Granulocytes 1 %    NRBCs per 100 WBC 0 <1 /100    Absolute Neutrophils 2.8 1.6 - 8.3 10e3/uL    Absolute Lymphocytes 1.7 0.8 - 5.3 10e3/uL    Absolute Monocytes 0.3 0.0 - 1.3 10e3/uL    Absolute Eosinophils 0.1 0.0 - 0.7 10e3/uL    Absolute Basophils 0.0 0.0 - 0.2 10e3/uL    Absolute Immature Granulocytes 0.0 <=0.4 10e3/uL    Absolute NRBCs 0.0 10e3/uL   Glucose by meter     Status: Abnormal   Result Value Ref Range    GLUCOSE BY METER POCT 356 (H) 70 -  99 mg/dL   Glucose by meter     Status: Abnormal   Result Value Ref Range    GLUCOSE BY METER POCT 209 (H) 70 - 99 mg/dL   Lactic acid whole blood     Status: Abnormal   Result Value Ref Range    Lactic Acid 2.7 (H) 0.7 - 2.0 mmol/L   Lactic acid whole blood     Status: Normal   Result Value Ref Range    Lactic Acid 2.0 0.7 - 2.0 mmol/L   Glucose by meter     Status: Abnormal   Result Value Ref Range    GLUCOSE BY METER POCT 219 (H) 70 - 99 mg/dL   Glucose by meter     Status: Abnormal   Result Value Ref Range    GLUCOSE BY METER POCT 210 (H) 70 - 99 mg/dL   Comprehensive metabolic panel     Status: Abnormal   Result Value Ref Range    Sodium 144 136 - 145 mmol/L    Potassium 4.2 3.4 - 5.3 mmol/L    Chloride 110 (H) 98 - 107 mmol/L    Carbon Dioxide (CO2) 21 (L) 22 - 29 mmol/L    Anion Gap 13 7 - 15 mmol/L    Urea Nitrogen 16.0 8.0 - 23.0 mg/dL    Creatinine 1.25 (H) 0.51 - 0.95 mg/dL    Calcium 8.2 (L) 8.8 - 10.2 mg/dL    Glucose 149 (H) 70 - 99 mg/dL    Alkaline Phosphatase 111 (H) 35 - 104 U/L    AST 29 0 - 45 U/L    ALT 24 0 - 50 U/L    Protein Total 5.3 (L) 6.4 - 8.3 g/dL    Albumin 3.1 (L) 3.5 - 5.2 g/dL    Bilirubin Total 0.3 <=1.2 mg/dL    GFR Estimate 48 (L) >60 mL/min/1.73m2   Glucose by meter     Status: Normal   Result Value Ref Range    GLUCOSE BY METER POCT 88 70 - 99 mg/dL   Glucose by meter     Status: Normal   Result Value Ref Range    GLUCOSE BY METER POCT 76 70 - 99 mg/dL   Glucose by meter     Status: Normal   Result Value Ref Range    GLUCOSE BY METER POCT 88 70 - 99 mg/dL   Glucose by meter     Status: Normal   Result Value Ref Range    GLUCOSE BY METER POCT 95 70 - 99 mg/dL   Glucose by meter     Status: Abnormal   Result Value Ref Range    GLUCOSE BY METER POCT 130 (H) 70 - 99 mg/dL   CBC with platelets and differential     Status: Abnormal   Result Value Ref Range    WBC Count 4.4 4.0 - 11.0 10e3/uL    RBC Count 3.49 (L) 3.80 - 5.20 10e6/uL    Hemoglobin 10.3 (L) 11.7 - 15.7 g/dL     Hematocrit 32.4 (L) 35.0 - 47.0 %    MCV 93 78 - 100 fL    MCH 29.5 26.5 - 33.0 pg    MCHC 31.8 31.5 - 36.5 g/dL    RDW 15.5 (H) 10.0 - 15.0 %    Platelet Count 100 (L) 150 - 450 10e3/uL    % Neutrophils 51 %    % Lymphocytes 36 %    % Monocytes 7 %    % Eosinophils 3 %    % Basophils 1 %    % Immature Granulocytes 2 %    NRBCs per 100 WBC 0 <1 /100    Absolute Neutrophils 2.3 1.6 - 8.3 10e3/uL    Absolute Lymphocytes 1.6 0.8 - 5.3 10e3/uL    Absolute Monocytes 0.3 0.0 - 1.3 10e3/uL    Absolute Eosinophils 0.1 0.0 - 0.7 10e3/uL    Absolute Basophils 0.0 0.0 - 0.2 10e3/uL    Absolute Immature Granulocytes 0.1 <=0.4 10e3/uL    Absolute NRBCs 0.0 10e3/uL   RBC and Platelet Morphology     Status: None   Result Value Ref Range    Platelet Assessment  Automated Count Confirmed. Platelet morphology is normal.     Automated Count Confirmed. Platelet morphology is normal.    RBC Morphology Confirmed RBC Indices    Glucose by meter     Status: Abnormal   Result Value Ref Range    GLUCOSE BY METER POCT 310 (H) 70 - 99 mg/dL   Glucose by meter     Status: Abnormal   Result Value Ref Range    GLUCOSE BY METER POCT 275 (H) 70 - 99 mg/dL   Urine Culture     Status: Abnormal (Preliminary result)    Specimen: Urine, NOS   Result Value Ref Range    Culture >100,000 CFU/mL Escherichia coli (A)    CBC with platelets differential     Status: None    Narrative    The following orders were created for panel order CBC with platelets differential.  Procedure                               Abnormality         Status                     ---------                               -----------         ------                     CBC with platelets and d...[[id number]]                      Final result                 Please view results for these tests on the individual orders.   CBC with Platelets & Differential     Status: Abnormal    Narrative    The following orders were created for panel order CBC with Platelets & Differential.  Procedure                                Abnormality         Status                     ---------                               -----------         ------                     CBC with platelets and d...[[id number]]  Abnormal            Final result                 Please view results for these tests on the individual orders.   CBC with Platelets & Differential     Status: Abnormal    Narrative    The following orders were created for panel order CBC with Platelets & Differential.  Procedure                               Abnormality         Status                     ---------                               -----------         ------                     CBC with platelets and d...[[id number]]  Abnormal            Final result               RBC and Platelet Morphology[[id number]]                      Final result                 Please view results for these tests on the individual orders.      No results found for this or any previous visit (from the past 48 hour(s)).             Pending Results:     Unresulted Labs Ordered in the Past 30 Days of this Admission       Date and Time Order Name Status Description    6/16/2023 10:21 PM Urine Culture Preliminary                     Discharge Instructions and Follow-Up:     Discharge Procedure Orders   Keppra (Levetiracetam) Level   Standing Status: Future Standing Exp. Date: 06/18/24     Adult Neurology  Referral   Standing Status: Future   Referral Priority: Routine: Next available opening Referral Type: Consultation   Number of Visits Requested: 1          Attestation:  PEGGY Alejandro CNP.    Time spent on patient: 30 minutes total including face to face and coordinating care time reviewing current illness, any medication changes, and the care plan for today.

## 2023-06-18 NOTE — PROGRESS NOTES
Pt is a high probably discharge pending her daughter arrival to pick her up. There are no discharge order at this time due to primary caregiver absent. Pt has been between sleep and awake for BRP and meal only. She is assist of one to bathroom. Blood pressure (!) 141/68, pulse 99, temperature 97  F (36.1  C), temperature source Axillary, resp. rate 20, SpO2 98 %, not currently breastfeeding.  1820: Pt has discharged to home with family. IV is removed. All belongings returned to daughter. AVS education is complete aswell. Pt daughter had no question.

## 2023-06-18 NOTE — PLAN OF CARE
Observation goals  -diagnostic tests and consults completed and resulted - Pending  -vital signs normal or at patient baseline -Not met, BP still elevated.Lactic 2.7.   Bolus given x1   -tolerating oral intake to maintain hydration- slow in swallowing.  -adequate pain control on oral analgesics  -Met  -safe disposition plan has been identified -Not met    Afebrile, hypertensive, OVSS on RA.  Hydralazine 10  Mg given x1.  Denies pain and nausea.  R PIV infusing NS @ 125 ml/hr.  Voiding spontaneously without difficulty.  No BM this shift.  Tolerating Pureed diet.  Swallows pills slowly.  Up with SBA.  Continue plan of care.

## 2023-06-20 ENCOUNTER — PATIENT OUTREACH (OUTPATIENT)
Dept: CARE COORDINATION | Facility: CLINIC | Age: 63
End: 2023-06-20
Payer: COMMERCIAL

## 2023-06-20 NOTE — PROGRESS NOTES
[facility] offered Clinic Care Coordination to an established [facility] eligible patient and patient declined CCC at this time.    Clinic Care Coordination Contact  [facility]: Post-Discharge Note  SITUATION                                                      Admission:    Admission Date: 06/16/23   Reason for Admission: Nausea, Vomiting  Discharge:   Discharge Date: 06/18/23  Discharge Diagnosis: Cognitive decline, Nausea and vomiting, unspecified vomiting type    BACKGROUND                                                      Per hospital discharge summary and inpatient provider notes:    The history is provided by the patient, medical records and a relative. The history is limited by the condition of the patient (Dementia).      Isabell Matthews is a 62 year old female with a past medical history of HTN, HLD, type I diabetes mellitus, CKD stage 3b, hyperglycemia, hyperthyroidism, dementia, seizures, and previous stroke.     Per chart review, patient was hospitalized at [facility] and was discharged (06/15/23) at around 5pm for DKA. Patients daughter called nurse triage and said that patient was vomiting when daughter got to hospital and continued to throw up in the car ride home. Patient was then recommended to be seen here at ED.      Pt came to in ED early Sunday - saw signs of DKA right away - ended up getting sick. She was discharged. During hospitalization Sunday-Thursday morning no throwing up. She started throwing up Thursday morning.      Breakfast didn't stay down and her lunch came up - was given zofran through IV - 30 mins later was eating then 30 mins later threw up again. Daughter picked her up at 5pm (06/15/2023) and during car ride home threw up again. Was given Prochlorperazine Maleate for nausea at 6pm. At 7pm threw up again - called nurse who called doctor who was told to bring her back.     Daughter stated that even with a bite or two her mother throws up half an hour later. She doesn't know if it's  "something internal. Patient says she feels \"fine\", a little bit of pain around abdominal area, daughter reported that the pt had complains about her left side stomach area before arrival to ED. Abdominal pain described as sharp but not consistent.      Pt is urinating okay. Was able to sip on water throughout the day.       Upon arrival in the ED, she was noted to be nauseous. She was given IV antiemetics and IVF with mild improvement. VSS: T 98.1 P 82 /103 RR 16 O2 100% RA. LAB: CMP shows mild hyponatremia (Na 135) K+ 4.2, Cr/BUN 1.44/17, slightly up from baseline 1.33. No signs of DKA with AG serum ketones being wnl. Alk phos mildly elevated at 147, AST and ALT, and lipase wnl. No indication of infection process, lactic acid wnl, no leukocytosis. HGB stable at 12.4. VBG edwin, no signs of acidosis. Covid19/influnze A/B negative. CT abdomen, a very small amount of nonspecific free fluid in the pelvis. No other convincing cause of acute pain identified in the abdomen or pelvis. No evidence of bowel obstruction. Colonic diverticulosis without evidence of diverticulitis. Patient is admitted to ed observation unit for n/v management and PT assessment.      On admission to the observation unit the patient was stable    ASSESSMENT      Discharge Assessment  How are you doing now that you are home?: \"She's been feeling good...when they discharged her the first evening she went kind of low...so I got her a meal and other than that has been good. She's had some loose bowel movements probably from the antibotics but she's doing well.\"  How are your symptoms? (Red Flag symptoms escalate to triage hotline per guidelines): Improved  Do you feel your condition is stable enough to be safe at home until your provider visit?: Yes  Does the patient have their discharge instructions? : Yes  Does the patient have questions regarding their discharge instructions? : No  Were you started on any new medications or were there changes " to any of your previous medications? : Yes  Does the patient have all of their medications?: Yes  Do you have questions regarding any of your medications? : No  Do you have all of your needed medical supplies or equipment (DME)?  (i.e. oxygen tank, CPAP, cane, etc.): Yes  Discharge follow-up appointment scheduled within 14 calendar days? : Yes  Discharge Follow Up Appointment Date: 06/26/23  Discharge Follow Up Appointment Scheduled with?: Primary Care Provider    Post-op ([facility] CTA Only)  If the patient had a surgery or procedure, do they have any questions for a nurse?: No    PLAN                                                      Outpatient Plan:      Adult [facility] Follow-up and recommended labs and tests    Follow up with your primary care doctor in one week for hospital follow up  Follow up with Endocrinology outpatient.    Appointments on [place] and/or [facility] (with [facility] or [facility] provider or service). Call [phone] if you haven't heard regarding these appointments within 7 days of discharge.    Additional Services    Adult Neurology  Referral  Please be aware that coverage of these services is subject to the terms and limitations of your health insurance plan.    Call member services at your health plan with any benefit or coverage questions.  [employer] will call you to coordinate your care as prescribed by your provider. If you don't hear from a representative within 2 business days, please call [phone].  Reason for Referral: Epilepsy/Seizures  Scheduling Instructions: [employer] will call you to coordinate your care as prescribed by your provider. If you don't hear from a representative within 2 business days, please call [phone].  Additional Information: Seizures low keppra level during admission. Repeat Keppra level. May need dose adjustment.    Future Appointments   Date Time Provider Department Center   6/26/2023 10:30 AM [facility]    6/26/2023 11:00 AM Bony Castaneda MD [facility]   7/18/2023  8:00 AM Mai Figueroa PA-C [facility]         For any urgent concerns, please contact our 24 hour nurse triage line: [phone] ([phone])         URBANO Fernandez  [phone]  [facility]

## 2023-07-14 ENCOUNTER — TELEPHONE (OUTPATIENT)
Dept: ENDOCRINOLOGY | Facility: CLINIC | Age: 63
End: 2023-07-14
Payer: COMMERCIAL

## 2023-07-14 NOTE — TELEPHONE ENCOUNTER
Called patient and left voicemail. Patient has an appointment on 7/18/23. Need to collect the last 14 days worth of blood sugar readings to prepare for patient's visit.   Alisia Colon MA

## 2023-09-04 ENCOUNTER — APPOINTMENT (OUTPATIENT)
Dept: GENERAL RADIOLOGY | Facility: CLINIC | Age: 63
DRG: 184 | End: 2023-09-04
Attending: EMERGENCY MEDICINE
Payer: COMMERCIAL

## 2023-09-04 ENCOUNTER — HOSPITAL ENCOUNTER (INPATIENT)
Facility: CLINIC | Age: 63
LOS: 2 days | Discharge: HOME | DRG: 184 | End: 2023-09-06
Attending: EMERGENCY MEDICINE | Admitting: SURGERY
Payer: COMMERCIAL

## 2023-09-04 ENCOUNTER — APPOINTMENT (OUTPATIENT)
Dept: CT IMAGING | Facility: CLINIC | Age: 63
DRG: 184 | End: 2023-09-04
Attending: NURSE PRACTITIONER
Payer: COMMERCIAL

## 2023-09-04 DIAGNOSIS — S22.41XA CLOSED FRACTURE OF MULTIPLE RIBS OF RIGHT SIDE, INITIAL ENCOUNTER: ICD-10-CM

## 2023-09-04 DIAGNOSIS — E10.59 TYPE 1 DIABETES MELLITUS WITH OTHER CIRCULATORY COMPLICATION (H): ICD-10-CM

## 2023-09-04 DIAGNOSIS — N30.00 ACUTE CYSTITIS WITHOUT HEMATURIA: Primary | ICD-10-CM

## 2023-09-04 LAB
ANION GAP SERPL CALCULATED.3IONS-SCNC: 11 MMOL/L (ref 7–15)
ATRIAL RATE - MUSE: 72 BPM
ATRIAL RATE - MUSE: 80 BPM
BASOPHILS # BLD AUTO: 0 10E3/UL (ref 0–0.2)
BASOPHILS NFR BLD AUTO: 1 %
BUN SERPL-MCNC: 44.1 MG/DL (ref 8–23)
CALCIUM SERPL-MCNC: 10 MG/DL (ref 8.8–10.2)
CHLORIDE SERPL-SCNC: 107 MMOL/L (ref 98–107)
CK SERPL-CCNC: 353 U/L (ref 26–192)
CK SERPL-CCNC: 460 U/L (ref 26–192)
CREAT SERPL-MCNC: 1.64 MG/DL (ref 0.51–0.95)
DEPRECATED HCO3 PLAS-SCNC: 23 MMOL/L (ref 22–29)
DIASTOLIC BLOOD PRESSURE - MUSE: NORMAL MMHG
DIASTOLIC BLOOD PRESSURE - MUSE: NORMAL MMHG
EOSINOPHIL # BLD AUTO: 0.1 10E3/UL (ref 0–0.7)
EOSINOPHIL NFR BLD AUTO: 1 %
ERYTHROCYTE [DISTWIDTH] IN BLOOD BY AUTOMATED COUNT: 14.5 % (ref 10–15)
GFR SERPL CREATININE-BSD FRML MDRD: 34 ML/MIN/1.73M2
GLUCOSE BLDC GLUCOMTR-MCNC: 123 MG/DL (ref 70–99)
GLUCOSE BLDC GLUCOMTR-MCNC: 151 MG/DL (ref 70–99)
GLUCOSE BLDC GLUCOMTR-MCNC: 212 MG/DL (ref 70–99)
GLUCOSE BLDC GLUCOMTR-MCNC: 234 MG/DL (ref 70–99)
GLUCOSE BLDC GLUCOMTR-MCNC: 69 MG/DL (ref 70–99)
GLUCOSE SERPL-MCNC: 48 MG/DL (ref 70–99)
HBA1C MFR BLD: 9.3 %
HCT VFR BLD AUTO: 36 % (ref 35–47)
HGB BLD-MCNC: 11 G/DL (ref 11.7–15.7)
HOLD SPECIMEN: NORMAL
IMM GRANULOCYTES # BLD: 0 10E3/UL
IMM GRANULOCYTES NFR BLD: 0 %
INR PPP: 0.98 (ref 0.85–1.15)
INTERPRETATION ECG - MUSE: NORMAL
INTERPRETATION ECG - MUSE: NORMAL
LEVETIRACETAM SERPL-MCNC: <2 ΜG/ML (ref 10–40)
LYMPHOCYTES # BLD AUTO: 1.7 10E3/UL (ref 0.8–5.3)
LYMPHOCYTES NFR BLD AUTO: 20 %
MCH RBC QN AUTO: 28.5 PG (ref 26.5–33)
MCHC RBC AUTO-ENTMCNC: 30.6 G/DL (ref 31.5–36.5)
MCV RBC AUTO: 93 FL (ref 78–100)
MONOCYTES # BLD AUTO: 0.5 10E3/UL (ref 0–1.3)
MONOCYTES NFR BLD AUTO: 6 %
NEUTROPHILS # BLD AUTO: 6.3 10E3/UL (ref 1.6–8.3)
NEUTROPHILS NFR BLD AUTO: 72 %
NRBC # BLD AUTO: 0 10E3/UL
NRBC BLD AUTO-RTO: 0 /100
P AXIS - MUSE: 58 DEGREES
P AXIS - MUSE: 61 DEGREES
PLATELET # BLD AUTO: 241 10E3/UL (ref 150–450)
POTASSIUM SERPL-SCNC: 4.3 MMOL/L (ref 3.4–5.3)
PR INTERVAL - MUSE: 140 MS
PR INTERVAL - MUSE: 142 MS
QRS DURATION - MUSE: 88 MS
QRS DURATION - MUSE: 90 MS
QT - MUSE: 396 MS
QT - MUSE: 424 MS
QTC - MUSE: 456 MS
QTC - MUSE: 464 MS
R AXIS - MUSE: 16 DEGREES
R AXIS - MUSE: 33 DEGREES
RBC # BLD AUTO: 3.86 10E6/UL (ref 3.8–5.2)
SODIUM SERPL-SCNC: 141 MMOL/L (ref 136–145)
SYSTOLIC BLOOD PRESSURE - MUSE: NORMAL MMHG
SYSTOLIC BLOOD PRESSURE - MUSE: NORMAL MMHG
T AXIS - MUSE: 21 DEGREES
T AXIS - MUSE: 59 DEGREES
TROPONIN T SERPL HS-MCNC: 32 NG/L
TROPONIN T SERPL HS-MCNC: 33 NG/L
VENTRICULAR RATE- MUSE: 72 BPM
VENTRICULAR RATE- MUSE: 80 BPM
WBC # BLD AUTO: 8.7 10E3/UL (ref 4–11)

## 2023-09-04 PROCEDURE — 120N000002 HC R&B MED SURG/OB UMMC

## 2023-09-04 PROCEDURE — 99285 EMERGENCY DEPT VISIT HI MDM: CPT | Mod: 25

## 2023-09-04 PROCEDURE — 250N000011 HC RX IP 250 OP 636: Performed by: SURGERY

## 2023-09-04 PROCEDURE — 96374 THER/PROPH/DIAG INJ IV PUSH: CPT

## 2023-09-04 PROCEDURE — 93010 ELECTROCARDIOGRAM REPORT: CPT | Performed by: EMERGENCY MEDICINE

## 2023-09-04 PROCEDURE — 70450 CT HEAD/BRAIN W/O DYE: CPT | Mod: 26 | Performed by: STUDENT IN AN ORGANIZED HEALTH CARE EDUCATION/TRAINING PROGRAM

## 2023-09-04 PROCEDURE — 85025 COMPLETE CBC W/AUTO DIFF WBC: CPT | Performed by: EMERGENCY MEDICINE

## 2023-09-04 PROCEDURE — 82962 GLUCOSE BLOOD TEST: CPT

## 2023-09-04 PROCEDURE — 83036 HEMOGLOBIN GLYCOSYLATED A1C: CPT | Performed by: NURSE PRACTITIONER

## 2023-09-04 PROCEDURE — 99221 1ST HOSP IP/OBS SF/LOW 40: CPT | Performed by: STUDENT IN AN ORGANIZED HEALTH CARE EDUCATION/TRAINING PROGRAM

## 2023-09-04 PROCEDURE — 74177 CT ABD & PELVIS W/CONTRAST: CPT

## 2023-09-04 PROCEDURE — 71101 X-RAY EXAM UNILAT RIBS/CHEST: CPT | Mod: 26 | Performed by: RADIOLOGY

## 2023-09-04 PROCEDURE — 80177 DRUG SCRN QUAN LEVETIRACETAM: CPT | Performed by: NURSE PRACTITIONER

## 2023-09-04 PROCEDURE — 250N000012 HC RX MED GY IP 250 OP 636 PS 637: Performed by: NURSE PRACTITIONER

## 2023-09-04 PROCEDURE — 250N000013 HC RX MED GY IP 250 OP 250 PS 637: Performed by: NURSE PRACTITIONER

## 2023-09-04 PROCEDURE — 250N000013 HC RX MED GY IP 250 OP 250 PS 637: Performed by: EMERGENCY MEDICINE

## 2023-09-04 PROCEDURE — 36415 COLL VENOUS BLD VENIPUNCTURE: CPT | Performed by: EMERGENCY MEDICINE

## 2023-09-04 PROCEDURE — 71260 CT THORAX DX C+: CPT | Mod: 26 | Performed by: STUDENT IN AN ORGANIZED HEALTH CARE EDUCATION/TRAINING PROGRAM

## 2023-09-04 PROCEDURE — 250N000011 HC RX IP 250 OP 636: Performed by: EMERGENCY MEDICINE

## 2023-09-04 PROCEDURE — 70450 CT HEAD/BRAIN W/O DYE: CPT

## 2023-09-04 PROCEDURE — 93005 ELECTROCARDIOGRAM TRACING: CPT

## 2023-09-04 PROCEDURE — 36415 COLL VENOUS BLD VENIPUNCTURE: CPT | Performed by: NURSE PRACTITIONER

## 2023-09-04 PROCEDURE — 85610 PROTHROMBIN TIME: CPT | Performed by: NURSE PRACTITIONER

## 2023-09-04 PROCEDURE — 74177 CT ABD & PELVIS W/CONTRAST: CPT | Mod: 26 | Performed by: STUDENT IN AN ORGANIZED HEALTH CARE EDUCATION/TRAINING PROGRAM

## 2023-09-04 PROCEDURE — 71101 X-RAY EXAM UNILAT RIBS/CHEST: CPT | Mod: RT

## 2023-09-04 PROCEDURE — 94150 VITAL CAPACITY TEST: CPT

## 2023-09-04 PROCEDURE — 84484 ASSAY OF TROPONIN QUANT: CPT | Performed by: NURSE PRACTITIONER

## 2023-09-04 PROCEDURE — 80048 BASIC METABOLIC PNL TOTAL CA: CPT | Performed by: EMERGENCY MEDICINE

## 2023-09-04 PROCEDURE — 82550 ASSAY OF CK (CPK): CPT | Performed by: NURSE PRACTITIONER

## 2023-09-04 PROCEDURE — 999N000157 HC STATISTIC RCP TIME EA 10 MIN

## 2023-09-04 PROCEDURE — 99285 EMERGENCY DEPT VISIT HI MDM: CPT | Mod: 25 | Performed by: EMERGENCY MEDICINE

## 2023-09-04 PROCEDURE — 99222 1ST HOSP IP/OBS MODERATE 55: CPT | Mod: GC | Performed by: ANESTHESIOLOGY

## 2023-09-04 PROCEDURE — 84484 ASSAY OF TROPONIN QUANT: CPT | Performed by: EMERGENCY MEDICINE

## 2023-09-04 RX ORDER — LEVOTHYROXINE SODIUM 75 UG/1
75 TABLET ORAL
Status: DISCONTINUED | OUTPATIENT
Start: 2023-09-05 | End: 2023-09-07 | Stop reason: HOSPADM

## 2023-09-04 RX ORDER — METHOCARBAMOL 500 MG/1
500 TABLET, FILM COATED ORAL EVERY 6 HOURS PRN
Status: DISCONTINUED | OUTPATIENT
Start: 2023-09-04 | End: 2023-09-05

## 2023-09-04 RX ORDER — OXYCODONE HYDROCHLORIDE 5 MG/1
5 TABLET ORAL EVERY 4 HOURS PRN
Status: DISCONTINUED | OUTPATIENT
Start: 2023-09-04 | End: 2023-09-07 | Stop reason: HOSPADM

## 2023-09-04 RX ORDER — DOXAZOSIN 1 MG/1
1 TABLET ORAL DAILY
Status: DISCONTINUED | OUTPATIENT
Start: 2023-09-04 | End: 2023-09-07 | Stop reason: HOSPADM

## 2023-09-04 RX ORDER — GABAPENTIN 100 MG/1
100 CAPSULE ORAL 3 TIMES DAILY
Status: DISCONTINUED | OUTPATIENT
Start: 2023-09-04 | End: 2023-09-04

## 2023-09-04 RX ORDER — LIDOCAINE 4 G/G
1 PATCH TOPICAL ONCE
Status: COMPLETED | OUTPATIENT
Start: 2023-09-04 | End: 2023-09-04

## 2023-09-04 RX ORDER — DEXTROSE MONOHYDRATE 25 G/50ML
25-50 INJECTION, SOLUTION INTRAVENOUS
Status: DISCONTINUED | OUTPATIENT
Start: 2023-09-04 | End: 2023-09-07 | Stop reason: HOSPADM

## 2023-09-04 RX ORDER — LIDOCAINE 4 G/G
1 PATCH TOPICAL
Status: DISCONTINUED | OUTPATIENT
Start: 2023-09-05 | End: 2023-09-05

## 2023-09-04 RX ORDER — CARVEDILOL 12.5 MG/1
12.5 TABLET ORAL 2 TIMES DAILY WITH MEALS
Status: DISCONTINUED | OUTPATIENT
Start: 2023-09-04 | End: 2023-09-07 | Stop reason: HOSPADM

## 2023-09-04 RX ORDER — CLOPIDOGREL BISULFATE 75 MG/1
75 TABLET ORAL DAILY
Status: DISCONTINUED | OUTPATIENT
Start: 2023-09-04 | End: 2023-09-05

## 2023-09-04 RX ORDER — DULOXETIN HYDROCHLORIDE 20 MG/1
20 CAPSULE, DELAYED RELEASE ORAL DAILY
Status: DISCONTINUED | OUTPATIENT
Start: 2023-09-04 | End: 2023-09-07 | Stop reason: HOSPADM

## 2023-09-04 RX ORDER — ACETAMINOPHEN 325 MG/1
975 TABLET ORAL EVERY 8 HOURS
Status: DISCONTINUED | OUTPATIENT
Start: 2023-09-04 | End: 2023-09-07 | Stop reason: HOSPADM

## 2023-09-04 RX ORDER — ATORVASTATIN CALCIUM 40 MG/1
40 TABLET, FILM COATED ORAL EVERY EVENING
Status: DISCONTINUED | OUTPATIENT
Start: 2023-09-04 | End: 2023-09-07 | Stop reason: HOSPADM

## 2023-09-04 RX ORDER — MORPHINE SULFATE 2 MG/ML
2 INJECTION, SOLUTION INTRAMUSCULAR; INTRAVENOUS
Status: DISCONTINUED | OUTPATIENT
Start: 2023-09-04 | End: 2023-09-05

## 2023-09-04 RX ORDER — GABAPENTIN 100 MG/1
100 CAPSULE ORAL AT BEDTIME
Status: DISCONTINUED | OUTPATIENT
Start: 2023-09-04 | End: 2023-09-05

## 2023-09-04 RX ORDER — LANOLIN ALCOHOL/MO/W.PET/CERES
3 CREAM (GRAM) TOPICAL
Status: DISCONTINUED | OUTPATIENT
Start: 2023-09-04 | End: 2023-09-07 | Stop reason: HOSPADM

## 2023-09-04 RX ORDER — AMOXICILLIN 250 MG
1-2 CAPSULE ORAL 2 TIMES DAILY
Status: DISCONTINUED | OUTPATIENT
Start: 2023-09-04 | End: 2023-09-07 | Stop reason: HOSPADM

## 2023-09-04 RX ORDER — NICOTINE POLACRILEX 4 MG
15-30 LOZENGE BUCCAL
Status: DISCONTINUED | OUTPATIENT
Start: 2023-09-04 | End: 2023-09-07 | Stop reason: HOSPADM

## 2023-09-04 RX ORDER — AMLODIPINE BESYLATE 5 MG/1
5 TABLET ORAL DAILY
Status: DISCONTINUED | OUTPATIENT
Start: 2023-09-04 | End: 2023-09-07 | Stop reason: HOSPADM

## 2023-09-04 RX ORDER — MORPHINE SULFATE 2 MG/ML
2 INJECTION, SOLUTION INTRAMUSCULAR; INTRAVENOUS
Status: COMPLETED | OUTPATIENT
Start: 2023-09-04 | End: 2023-09-04

## 2023-09-04 RX ORDER — IOPAMIDOL 755 MG/ML
80 INJECTION, SOLUTION INTRAVASCULAR ONCE
Status: COMPLETED | OUTPATIENT
Start: 2023-09-04 | End: 2023-09-04

## 2023-09-04 RX ADMIN — CARVEDILOL 12.5 MG: 12.5 TABLET, FILM COATED ORAL at 17:08

## 2023-09-04 RX ADMIN — GABAPENTIN 100 MG: 100 CAPSULE ORAL at 21:19

## 2023-09-04 RX ADMIN — ACETAMINOPHEN 975 MG: 325 TABLET, FILM COATED ORAL at 14:05

## 2023-09-04 RX ADMIN — SENNOSIDES AND DOCUSATE SODIUM 1 TABLET: 50; 8.6 TABLET ORAL at 10:14

## 2023-09-04 RX ADMIN — SENNOSIDES AND DOCUSATE SODIUM 1 TABLET: 50; 8.6 TABLET ORAL at 21:19

## 2023-09-04 RX ADMIN — CLOPIDOGREL BISULFATE 75 MG: 75 TABLET ORAL at 12:23

## 2023-09-04 RX ADMIN — DULOXETINE HYDROCHLORIDE 20 MG: 20 CAPSULE, DELAYED RELEASE ORAL at 12:23

## 2023-09-04 RX ADMIN — CARVEDILOL 12.5 MG: 12.5 TABLET, FILM COATED ORAL at 12:23

## 2023-09-04 RX ADMIN — AMLODIPINE BESYLATE 5 MG: 5 TABLET ORAL at 12:23

## 2023-09-04 RX ADMIN — OXYCODONE HYDROCHLORIDE 5 MG: 5 TABLET ORAL at 14:06

## 2023-09-04 RX ADMIN — OXYCODONE HYDROCHLORIDE 5 MG: 5 TABLET ORAL at 10:14

## 2023-09-04 RX ADMIN — MORPHINE SULFATE 2 MG: 2 INJECTION, SOLUTION INTRAMUSCULAR; INTRAVENOUS at 05:06

## 2023-09-04 RX ADMIN — ACETAMINOPHEN 975 MG: 325 TABLET, FILM COATED ORAL at 10:15

## 2023-09-04 RX ADMIN — INSULIN GLARGINE 7 UNITS: 100 INJECTION, SOLUTION SUBCUTANEOUS at 23:37

## 2023-09-04 RX ADMIN — ACETAMINOPHEN 975 MG: 325 TABLET, FILM COATED ORAL at 23:37

## 2023-09-04 RX ADMIN — ATORVASTATIN CALCIUM 40 MG: 40 TABLET, FILM COATED ORAL at 21:19

## 2023-09-04 RX ADMIN — LIDOCAINE PATCH 4% 1 PATCH: 40 PATCH TOPICAL at 03:20

## 2023-09-04 RX ADMIN — IOPAMIDOL 80 ML: 755 INJECTION, SOLUTION INTRAVENOUS at 10:44

## 2023-09-04 ASSESSMENT — ACTIVITIES OF DAILY LIVING (ADL)
DIFFICULTY_EATING/SWALLOWING: YES
EATING: 1-->ASSISTANCE (EQUIPMENT/PERSON) NEEDED
ADLS_ACUITY_SCORE: 51
ADLS_ACUITY_SCORE: 35
TOILETING_ISSUES: YES
SWALLOWING: 2-->DIFFICULTY SWALLOWING LIQUIDS/FOODS
WEAR_GLASSES_OR_BLIND: NO
CHANGE_IN_FUNCTIONAL_STATUS_SINCE_ONSET_OF_CURRENT_ILLNESS/INJURY: YES
DRESSING/BATHING_DIFFICULTY: YES
EATING: 0-->ASSISTANCE NEEDED (DEVELOPMENTALLY APPROPRIATE)
TOILETING_ASSISTANCE: TOILETING DIFFICULTY, ASSISTANCE 1 PERSON
FALL_HISTORY_WITHIN_LAST_SIX_MONTHS: YES
CONCENTRATING,_REMEMBERING_OR_MAKING_DECISIONS_DIFFICULTY: OTHER (SEE COMMENTS)
ADLS_ACUITY_SCORE: 35
TRANSFERRING: 1-->ASSISTANCE (EQUIPMENT/PERSON) NEEDED
DRESSING/BATHING: BATHING DIFFICULTY, ASSISTANCE 1 PERSON
NUMBER_OF_TIMES_PATIENT_HAS_FALLEN_WITHIN_LAST_SIX_MONTHS: 1
ADLS_ACUITY_SCORE: 35
ADLS_ACUITY_SCORE: 54
TOILETING: 1-->ASSISTANCE (EQUIPMENT/PERSON) NEEDED (NOT DEVELOPMENTALLY APPROPRIATE)
DRESS: 0-->ASSISTANCE NEEDED (DEVELOPMENTALLY APPROPRIATE)
ADLS_ACUITY_SCORE: 35
WALKING_OR_CLIMBING_STAIRS: AMBULATION DIFFICULTY, DEPENDENT
ADLS_ACUITY_SCORE: 46
EATING/SWALLOWING: SWALLOWING LIQUIDS;SWALLOWING SOLID FOOD
WALKING_OR_CLIMBING_STAIRS_DIFFICULTY: YES
ADLS_ACUITY_SCORE: 35
SWALLOWING: 2-->DIFFICULTY SWALLOWING FOODS
DOING_ERRANDS_INDEPENDENTLY_DIFFICULTY: NO
ADLS_ACUITY_SCORE: 38
ADLS_ACUITY_SCORE: 35
BATHING: 1-->ASSISTANCE NEEDED
TRANSFERRING: 1-->ASSISTANCE (EQUIPMENT/PERSON) NEEDED (NOT DEVELOPMENTALLY APPROPRIATE)
DRESS: 1-->ASSISTANCE (EQUIPMENT/PERSON) NEEDED
TOILETING: 1-->ASSISTANCE (EQUIPMENT/PERSON) NEEDED
ADLS_ACUITY_SCORE: 51

## 2023-09-04 NOTE — ED PROVIDER NOTES
ED Provider Note  [facility]      History     Chief Complaint   Patient presents with    Fall     HPI  Isabell Matthews is a 63 year old female who presents the ED for evaluation after a fall.  Has a history of hypertension, hyperlipidemia, type 1 diabetes, chronic kidney disease, hyperglycemia, hypothyroidism, vascular dementia, seizures, prior strokes, who presents to the ED with her family.  Family reports a fall that occurred approximately 1 hour prior to my evaluation.  She was in the bathroom and grabbed a towel bar.  The towel bar ripped off the wall and patient fell in between the toilet and the tub.  Denies head strike.  No neck pain or back pain.  Does have pain to the right lateral rib cage.  Family and patient are concerned for rib fracture.  Patient was reported a 9 out of 10.  Family states that she has a high pain tolerance.  Family does note a history of issues with narcotic pain medication but are comfortable with short-term narcotics if necessary.    Past Medical History  Past Medical History:   Diagnosis Date    Chronic pain     Coronary atherosclerosis 6/14/2016    Essential hypertension     Ex-cigarette smoker     quit 7/2018 over 35 years    Ex-cigarette smoker     Hyperlipidemia     Hypothyroid     Seizures (H)     Stroke (H)     Vascular dementia (H)      Past Surgical History:   Procedure Laterality Date    athroplasty hip Bilateral     2003, 2006    OTHER SURGICAL HISTORY      athroplasty hip    PICC DOUBLE LUMEN PLACEMENT Right 06/11/2023    right basilic 5 fr dl power picc 39 cm    STENT       acetaminophen (TYLENOL) 500 MG tablet  amLODIPine (NORVASC) 5 MG tablet  aspirin (ASA) 81 MG chewable tablet  atorvastatin (LIPITOR) 40 MG tablet  blood glucose monitoring (NO BRAND SPECIFIED) meter device kit  carvedilol (COREG) 12.5 MG tablet  clopidogrel (PLAVIX) 75 MG tablet  cyanocobalamin (VITAMIN B-12) 1000 MCG tablet  diclofenac (VOLTAREN) 1 % topical gel  doxazosin  (CARDURA) 1 MG tablet  DULoxetine (CYMBALTA) 20 MG capsule  gabapentin (NEURONTIN) 100 MG capsule  insulin aspart (NOVOLOG PEN) 100 UNIT/ML pen  insulin aspart (NOVOLOG PEN) 100 UNIT/ML pen  insulin glargine (LANTUS PEN) 100 UNIT/ML pen  insulin pen needle (31G X 8 MM) 31G X 8 MM miscellaneous  levETIRAcetam (KEPPRA) 500 MG tablet  loratadine (CLARITIN) 10 mg tablet  melatonin 10 mg Tab  METHYLCELLULOSE, LAXATIVE, PO  nystatin (MYCOSTATIN) 401915 UNIT/GM external cream  ondansetron (ZOFRAN ODT) 4 MG ODT tab  ondansetron (ZOFRAN ODT) 4 MG ODT tab  prochlorperazine (COMPAZINE) 10 MG tablet  SYNTHROID 75 MCG tablet  TRUE METRIX BLOOD GLUCOSE TEST test strip  Vitamin D3 (CHOLECALCIFEROL) 125 MCG (5000 UT) tablet  Vitamin D3 (CHOLECALCIFEROL) 25 mcg (1000 units) tablet  zinc oxide (DESITIN) 20 % external ointment      Allergies   Allergen Reactions    Seasonal Allergies      Family History  Family History   Problem Relation Age of Onset    Acute Myocardial Infarction Father     Heart Disease Maternal Grandmother     Dementia Maternal Grandmother     Myocardial Infarction Father     Dementia Paternal Grandmother     Heart Disease Paternal Grandmother      Social History   Social History     Tobacco Use    Smoking status: Former     Packs/day: 0.50     Years: 35.00     Pack years: 17.50     Types: Cigarettes     Quit date: 2018     Years since quittin.1    Smokeless tobacco: Never   Substance Use Topics    Alcohol use: Not Currently    Drug use: Not Currently         A medically appropriate review of systems was performed with pertinent positives and negatives noted in the HPI, and all other systems negative.    Physical Exam   BP: (!) 142/87  Pulse: 81  Temp: 97.6  F (36.4  C)  Resp: 18  SpO2: 98 %  Physical Exam  Vitals and nursing note reviewed.   Constitutional:       General: She is not in acute distress.     Appearance: Normal appearance.   HENT:      Head: Normocephalic and atraumatic.      Nose: Nose  normal.   Eyes:      Pupils: Pupils are equal, round, and reactive to light.   Cardiovascular:      Rate and Rhythm: Normal rate and regular rhythm.   Pulmonary:      Effort: Pulmonary effort is normal.   Chest:       Abdominal:      General: There is no distension.      Palpations: Abdomen is soft.      Tenderness: There is no abdominal tenderness. There is no right CVA tenderness, left CVA tenderness, guarding or rebound.   Musculoskeletal:         General: No deformity. Normal range of motion.      Cervical back: Normal range of motion. No rigidity or tenderness.   Skin:     General: Skin is warm.   Neurological:      Mental Status: She is alert and oriented to person, place, and time. Mental status is at baseline.      Comments: Mental status at baseline according to family.  No focal neurodeficits.   Psychiatric:         Mood and Affect: Mood normal.       ED Course, Procedures, & Data     ED Course as of 09/04/23 0724   Mon Sep 04, 2023   0355 Glucose(!!): 48  Pt given juice/crackers   0355      EKG Interpretation:     Interpreted by Marcello Hull DO  Time reviewed:0356   Symptoms at time of EKG: fall   Rhythm: normal sinus   Rate: normal  Axis: NORMAL  Ectopy: none  Conduction: normal  ST Segments/ T Waves: No ST-T wave changes  Q Waves: III, aVF  Comparison to prior: Unchanged    Clinical Impression: normal EKG        Results for orders placed or performed during the hospital encounter of 09/04/23   Ribs XR, unilat 3 views + PA chest, right     Status: None    Narrative    EXAM: XR RIBS and CHEST RT 3VW  LOCATION: [facility]  DATE: 9/4/2023    INDICATION: R lateral rib pain s p fall  COMPARISON: None.      Impression    IMPRESSION: The heart is at the upper limits of normal. Bibasilar subsegmental atelectasis is present. No pneumothorax is identified. Mildly displaced fractures of the right seventh through ninth ribs anterolaterally.    [place] Draw     Status:  None    Narrative    The following orders were created for panel order [place] Draw.  Procedure                               Abnormality         Status                     ---------                               -----------         ------                     Extra Blue Top Tube[[id number]]                              Final result               Extra Red Top Tube[[id number]]                               Final result               Extra Green Top (Lithium...[[id number]]                      Final result               Extra Purple Top Tube[[id number]]                            Final result                 Please view results for these tests on the individual orders.   Extra Blue Top Tube     Status: None   Result Value Ref Range    Hold Specimen JIC    Extra Red Top Tube     Status: None   Result Value Ref Range    Hold Specimen JIC    Extra Green Top (Lithium Heparin) Tube     Status: None   Result Value Ref Range    Hold Specimen JIC    Extra Purple Top Tube     Status: None   Result Value Ref Range    Hold Specimen JIC    Basic metabolic panel     Status: Abnormal   Result Value Ref Range    Sodium 141 136 - 145 mmol/L    Potassium 4.3 3.4 - 5.3 mmol/L    Chloride 107 98 - 107 mmol/L    Carbon Dioxide (CO2) 23 22 - 29 mmol/L    Anion Gap 11 7 - 15 mmol/L    Urea Nitrogen 44.1 (H) 8.0 - 23.0 mg/dL    Creatinine 1.64 (H) 0.51 - 0.95 mg/dL    Calcium 10.0 8.8 - 10.2 mg/dL    Glucose 48 (LL) 70 - 99 mg/dL    GFR Estimate 34 (L) >60 mL/min/1.73m2   Troponin T, High Sensitivity     Status: Abnormal   Result Value Ref Range    Troponin T, High Sensitivity 33 (H) <=14 ng/L   CBC with platelets and differential     Status: Abnormal   Result Value Ref Range    WBC Count 8.7 4.0 - 11.0 10e3/uL    RBC Count 3.86 3.80 - 5.20 10e6/uL    Hemoglobin 11.0 (L) 11.7 - 15.7 g/dL    Hematocrit 36.0 35.0 - 47.0 %    MCV 93 78 - 100 fL    MCH 28.5 26.5 - 33.0 pg    MCHC 30.6 (L) 31.5 - 36.5 g/dL    RDW 14.5 10.0 - 15.0 %    Platelet Count  241 150 - 450 10e3/uL    % Neutrophils 72 %    % Lymphocytes 20 %    % Monocytes 6 %    % Eosinophils 1 %    % Basophils 1 %    % Immature Granulocytes 0 %    NRBCs per 100 WBC 0 <1 /100    Absolute Neutrophils 6.3 1.6 - 8.3 10e3/uL    Absolute Lymphocytes 1.7 0.8 - 5.3 10e3/uL    Absolute Monocytes 0.5 0.0 - 1.3 10e3/uL    Absolute Eosinophils 0.1 0.0 - 0.7 10e3/uL    Absolute Basophils 0.0 0.0 - 0.2 10e3/uL    Absolute Immature Granulocytes 0.0 <=0.4 10e3/uL    Absolute NRBCs 0.0 10e3/uL   Glucose by meter     Status: Abnormal   Result Value Ref Range    GLUCOSE BY METER POCT 69 (L) 70 - 99 mg/dL   Glucose by meter     Status: Abnormal   Result Value Ref Range    GLUCOSE BY METER POCT 151 (H) 70 - 99 mg/dL   CBC with platelets differential     Status: Abnormal    Narrative    The following orders were created for panel order CBC with platelets differential.  Procedure                               Abnormality         Status                     ---------                               -----------         ------                     CBC with platelets and d...[[id number]]  Abnormal            Final result                 Please view results for these tests on the individual orders.     Medications   Lidocaine (LIDOCARE) 4 % Patch 1 patch (1 patch Transdermal $Patch/Med Applied 9/4/23 0320)   morphine (PF) injection 2 mg (has no administration in time range)   morphine (PF) injection 2 mg (2 mg Intravenous $Given 9/4/23 0506)     Labs Ordered and Resulted from Time of ED Arrival to Time of ED Departure   BASIC METABOLIC PANEL - Abnormal       Result Value    Sodium 141      Potassium 4.3      Chloride 107      Carbon Dioxide (CO2) 23      Anion Gap 11      Urea Nitrogen 44.1 (*)     Creatinine 1.64 (*)     Calcium 10.0      Glucose 48 (*)     GFR Estimate 34 (*)    TROPONIN T, HIGH SENSITIVITY - Abnormal    Troponin T, High Sensitivity 33 (*)    CBC WITH PLATELETS AND DIFFERENTIAL - Abnormal    WBC Count 8.7      RBC  Count 3.86      Hemoglobin 11.0 (*)     Hematocrit 36.0      MCV 93      MCH 28.5      MCHC 30.6 (*)     RDW 14.5      Platelet Count 241      % Neutrophils 72      % Lymphocytes 20      % Monocytes 6      % Eosinophils 1      % Basophils 1      % Immature Granulocytes 0      NRBCs per 100 WBC 0      Absolute Neutrophils 6.3      Absolute Lymphocytes 1.7      Absolute Monocytes 0.5      Absolute Eosinophils 0.1      Absolute Basophils 0.0      Absolute Immature Granulocytes 0.0      Absolute NRBCs 0.0     GLUCOSE BY METER - Abnormal    GLUCOSE BY METER POCT 69 (*)    GLUCOSE BY METER - Abnormal    GLUCOSE BY METER POCT 151 (*)      Ribs XR, unilat 3 views + PA chest, right   Final Result   IMPRESSION: The heart is at the upper limits of normal. Bibasilar subsegmental atelectasis is present. No pneumothorax is identified. Mildly displaced fractures of the right seventh through ninth ribs anterolaterally.              Critical care was not performed.     Medical Decision Making  The patient's presentation was of moderate complexity (an acute complicated injury).    The patient's evaluation involved:  ordering and/or review of 3+ test(s) in this encounter (see separate area of note for details)  discussion of management or test interpretation with another health professional (trauma surgery consult)    The patient's management necessitated high risk (a parenteral controlled substance) and high risk (a decision regarding hospitalization).    Assessment & Plan    Patient presents the ED for evaluation of right-sided rib pain after a ground-level fall.  Patient has history of vascular dementia.  Is at her baseline mental status according to family.  No report of head pain.  On exam, no evidence of head injury.  No neck pain.  She has tenderness throughout her right lateral rib cage.  She is in no respiratory distress.    Given the fall unclear circumstances, did obtain some basic labs.  Most notably, metabolic panel shows  glucose of 48.  Patient was given juice and crackers and glucose responded appropriately, most recently 151.  Metabolic panel shows creatinine 1.64 which is baseline, otherwise normal electrolytes.  CBC without significant anemia.  High sensitive troponin is 33 which is baseline.  EKG shows sinus rhythm without signs of acute ischemia or cardiac dysrhythmia.    Imaging shows mildly displaced fractures of the right seventh through ninth ribs anterolaterally.  Initial pain control with lidocaine patches unsuccessful, patient is now on IV morphine.  Case discussed with the trauma surgery team who will admit to their service.    I have reviewed the nursing notes. I have reviewed the findings, diagnosis, plan and need for follow up with the patient.    New Prescriptions    No medications on file       Final diagnoses:   Closed fracture of multiple ribs of right side, initial encounter       Marcello Hull DO  [facility] EMERGENCY DEPARTMENT  9/4/2023     Marcello Hull DO  09/04/23 0728

## 2023-09-04 NOTE — PLAN OF CARE
Goal Outcome Evaluation:   Plan of Care Reviewed With: patient  Overall Patient Progress: no changeOverall Patient Progress: no change  AVSS.  No pain at rest.  Using IS to 500-ml with constant verbal cues - poorly tolerated.  Speech consult order to evaluate her swallow. Keeps food/fluid - slow to swallow.   Incontinent of urine x2. Pure wick in place. Needs sample for ua/uc.   Continue to monitor status.

## 2023-09-04 NOTE — CONSULTS
Pain Service Consultation Note  [facility]      Patient Name: Isabell Matthews  MRN: [id number]   Age: 63 year old  Sex: female  Date: September 4, 2023                                      Reviewed: Yes    Referring Provider:  Dr. León  Referring Service:  Trauma Surgery  Reason for Consultation: Pain Management    Assessment/Recommendations:  Isabell Matthews is a 63 year old female with history of epilepsy, T2DM, recurrent UTI, CKD, vascular vs Alzheimer's dementia, and previous stroke. Today, reportedly the patient fell in her bathroom and tipped the towel bar off the wall with no head strike of LOC but concern for rib fracture, and reportedly cause of fall due to unsteadiness.  Family reports no focal deficits and she is at baseline upon arrival. On review of ED course her glucose on admission was found to be 48. CXR revealed displaced fractures of right sided 7-9 ribs. She has been admitted to the trauma service.       Plan:   No role for interventional regional anesthetic at this time. Patient is breathing comfortably and fell asleep after my evaluation while I was reviewing chart in room. Patient is a very poor historian and does not follow instructions well; high likelihood that she would not cooperate with a regional technique. Was unable to get in touch with daughter. Will re-evaluate patient tomorrow for need for a regional technique.   Recommend scheduling Tylenol 975mg  Would recommend avoiding morphine and preferentially using hydromorphone given more predictable pharmacology, if IV opiates are necessary.  Can use methocarbamol - would start PRN. Only schedule if she tolerates it well and is needing more analgesia, as it can be sedating  Agree with use of lidocaine patches    Thank you for the opportunity to participate in the care of Isabell Matthews    Discussed with attending anesthesiologist    Zach Montgomery MD  9/4/2023      History of Present Illness:  Isabell Matthews is a  63 year old female with history of epilepsy, T2DM, recurrent UTI, CKD, vascular vs Alzheimer's dementia, and previous stroke. Today, reportedly the patient fell in her bathroom and tipped the towel bar off the wall with no head strike of LOC but concern for rib fracture, and reportedly cause of fall due to unsteadiness.  Family reports no focal deficits and she is at baseline upon arrival. On review of ED course her glucose on admission was found to be 48. CXR revealed displaced fractures of right sided 7-9 ribs. She has been admitted to the trauma service.         Past Medical History:  Past Medical History:   Diagnosis Date    Chronic pain     Coronary atherosclerosis 2016    Essential hypertension     Ex-cigarette smoker     quit 2018 over 35 years    Ex-cigarette smoker     Hyperlipidemia     Hypothyroid     Seizures (H)     Stroke (H)     Vascular dementia (H)          Family History:    Family History   Problem Relation Age of Onset    Acute Myocardial Infarction Father     Heart Disease Maternal Grandmother     Dementia Maternal Grandmother     Myocardial Infarction Father     Dementia Paternal Grandmother     Heart Disease Paternal Grandmother        Social History:  Social History     Tobacco Use    Smoking status: Former     Packs/day: 0.50     Years: 35.00     Pack years: 17.50     Types: Cigarettes     Quit date: 2018     Years since quittin.1    Smokeless tobacco: Never   Substance Use Topics    Alcohol use: Not Currently             Review of Systems:  Complete ROS reviewed. Unless otherwise noted, all other systems found to be negative.        Laboratory Results:  Recent Labs   Lab Test 23  0228 22  0227 04/10/22  1441   INR 0.98   < > 0.93      < > 211   PTT  --   --  32   BUN 44.1*   < > 27    < > = values in this interval not displayed.         Allergies:  Allergies   Allergen Reactions    Seasonal Allergies        Current Pain Related Medications:  Medications  related to Pain Management (From now, onward)      Start     Dose/Rate Route Frequency Ordered Stop    09/05/23 0800  Lidocaine (LIDOCARE) 4 % Patch 1 patch         1 patch  over 12 Hours Transdermal EVERY 24 HOURS 0800 09/04/23 0801 09/04/23 0805  acetaminophen (TYLENOL) tablet 975 mg         975 mg Oral EVERY 8 HOURS 09/04/23 0801      09/04/23 0805  gabapentin (NEURONTIN) capsule 100 mg         100 mg Oral 3 TIMES DAILY 09/04/23 0801 09/04/23 0805  senna-docusate (SENOKOT-S/PERICOLACE) 8.6-50 MG per tablet 1-2 tablet         1-2 tablet Oral 2 TIMES DAILY 09/04/23 0801      09/04/23 0748  oxyCODONE IR (ROXICODONE) half-tab 2.5 mg        See Hyperspace for full Linked Orders Report.    2.5 mg Oral EVERY 4 HOURS PRN 09/04/23 0801      09/04/23 0748  oxyCODONE (ROXICODONE) tablet 5 mg        See Hyperspace for full Linked Orders Report.    5 mg Oral EVERY 4 HOURS PRN 09/04/23 0801      09/04/23 0746  methocarbamol (ROBAXIN) tablet 500 mg         500 mg Oral EVERY 6 HOURS PRN 09/04/23 0801 09/07/23 0745    09/04/23 0533  morphine (PF) injection 2 mg         2 mg Intravenous ONCE PRN 09/04/23 0533      09/04/23 0310  Lidocaine (LIDOCARE) 4 % Patch 1 patch         1 patch  over 12 Hours Transdermal ONCE 09/04/23 0306                Physical Exam:  Vitals: BP (!) 158/80   Pulse 83   Temp 36.4  C (97.6  F)   Resp 15   SpO2 96%     Physical Exam:   CONSTITUTIONAL/GENERAL APPEARANCE:  NAD  EYES: EOMI, sclera anicteric, PERRLA  ENT/NECK: atraumatic, lips and oral mucous membranes dry  RESPIRATORY: non-labored breathing despite rib fx. No splinting  CV: RRR, no audible rubs, gallops, or murmurs  ABDOMEN: Soft, non-tender, non-distended  MUSCULOSKELETAL/BACK/SPINE/EXTREMITIES: Moves all extremities purposefully.  No edema or obvious joint deformities   NEURO: Alert and oriented x2. Intermittently falling asleep during exam  SKIN/VASCULAR EXAM:  No jaundice, no visible rashes or lesions      Acute Inpatient Pain  "Service [facility]  Hours of pain coverage 24/7   Page via Amcom- Please Page the Pain Team Via Amcom: \"PAIN MANAGEMENT ACUTE INPATIENT/ [facility]\"    Zach Montgomery,   PGY-4, Dept. of Anesthesiology  [phone]    "

## 2023-09-04 NOTE — H&P
Trauma Staff:    I examined the patient, Isabell Matthews.  I reviewed the patient's medical record and the progress notes.  I discussed the patient history, physical exam findings, laboratory values, and results of any imaging studies with the members of the Trauma service.  The patient is a 63-year-old woman who fell when the bathroom towel bar ripped off the wall. The patient had grabbed the towel bar because she was feeling unsteady. The patient fell backward between the toilet and the tub. The patient has  9/10 right chest wall pain due to a right chest wall injury and multiple right-sided rib fractures. Initially, in the ED the patient had a glucose 48 mg/dL. The patient's serum troponin was elevated at 33.  The glucose normalized following oral intake. The patient has vascular dementia and is cared for by her family. The patient denies other injuries. The patient's family notes a history of issues with narcotic pain medication but is comfortable with short-term narcotics if necessary. The patient presents to the ED with her family. The fall occurred approximately 1 hour before arrival to the ED. The patient denies head injury, and there was no LOC. The patient denies neck pain or back pain.  The patient has 9 out of 10 pain over the right chest wall. The family states that she has a high pain tolerance and notes a history of narcotic pain medication issues but is comfortable with short-term narcotics if necessary. The patient's daughter, Charli, is the patient's PCA.    PAST MEDICAL HISTORY:  Past Medical History:   Diagnosis Date    Chronic pain     Coronary atherosclerosis 6/14/2016    Essential hypertension     Ex-cigarette smoker     quit 7/2018 over 35 years    Ex-cigarette smoker     Hyperlipidemia     Hypothyroid     Seizures (H)     Stroke (H)     Vascular dementia (H)      PAST SURGICAL HISTORY:  Past Surgical History:   Procedure Laterality Date    athroplasty hip Bilateral     2003, 2006    OTHER  SURGICAL HISTORY      athroplasty hip    PICC DOUBLE LUMEN PLACEMENT Right 2023    right basilic 5 fr dl power picc 39 cm    STENT       MEDICATIONS:  Current Facility-Administered Medications   Medication    Lidocaine (LIDOCARE) 4 % Patch 1 patch    morphine (PF) injection 2 mg     Current Outpatient Medications   Medication    acetaminophen (TYLENOL) 500 MG tablet    amLODIPine (NORVASC) 5 MG tablet    aspirin (ASA) 81 MG chewable tablet    atorvastatin (LIPITOR) 40 MG tablet    blood glucose monitoring (NO BRAND SPECIFIED) meter device kit    carvedilol (COREG) 12.5 MG tablet    clopidogrel (PLAVIX) 75 MG tablet    cyanocobalamin (VITAMIN B-12) 1000 MCG tablet    diclofenac (VOLTAREN) 1 % topical gel    doxazosin (CARDURA) 1 MG tablet    DULoxetine (CYMBALTA) 20 MG capsule    gabapentin (NEURONTIN) 100 MG capsule    insulin aspart (NOVOLOG PEN) 100 UNIT/ML pen    insulin aspart (NOVOLOG PEN) 100 UNIT/ML pen    insulin glargine (LANTUS PEN) 100 UNIT/ML pen    insulin pen needle (31G X 8 MM) 31G X 8 MM miscellaneous    levETIRAcetam (KEPPRA) 500 MG tablet    loratadine (CLARITIN) 10 mg tablet    melatonin 10 mg Tab    METHYLCELLULOSE, LAXATIVE, PO    nystatin (MYCOSTATIN) 217135 UNIT/GM external cream    ondansetron (ZOFRAN ODT) 4 MG ODT tab    ondansetron (ZOFRAN ODT) 4 MG ODT tab    prochlorperazine (COMPAZINE) 10 MG tablet    SYNTHROID 75 MCG tablet    TRUE METRIX BLOOD GLUCOSE TEST test strip    Vitamin D3 (CHOLECALCIFEROL) 125 MCG (5000 UT) tablet    Vitamin D3 (CHOLECALCIFEROL) 25 mcg (1000 units) tablet    zinc oxide (DESITIN) 20 % external ointment     ALLERGIES:  Allergies   Allergen Reactions    Seasonal Allergies        SOCIAL HISTORY:  Social History     Socioeconomic History    Marital status:    Tobacco Use    Smoking status: Former     Packs/day: 0.50     Years: 35.00     Pack years: 17.50     Types: Cigarettes     Quit date: 2018     Years since quittin.1    Smokeless tobacco:  Never   Substance and Sexual Activity    Alcohol use: Not Currently    Drug use: Not Currently    Sexual activity: Not Currently   Social History Narrative    ** Merged History Encounter **         Recently moved to [facility] with Daughter Charli who is her pca     FAMILY HISTORY:  Family History   Problem Relation Age of Onset    Acute Myocardial Infarction Father     Heart Disease Maternal Grandmother     Dementia Maternal Grandmother     Myocardial Infarction Father     Dementia Paternal Grandmother     Heart Disease Paternal Grandmother      Past surgical history: bilateral hip replacement     Preexisting medical conditions include systemic hypertension, hyperlipidemia, type 1 diabetes, chronic kidney disease, hyperglycemia, hypothyroidism, vascular dementia, seizures, and prior strokes.      Social history: Former cigarette smoker, 1/2 pack per day for 35 years.  Quit: 7/1/2018    Primary Survey:    Airway: speaking in full sentences    Breathing: breath sounds bilaterally    Circulation: Blood pressure 158/80     Disability: Moving all extremities with normal sensation to light touch and motor.    Primary Survey:  Vital Signs: BP (!) 158/80   Pulse 83   Temp 97.6   (36.4   C)   Re sp 15   SpO2 96%    General: The patient is awake and alert.  HE ENT: Pupils 2 mm reactive bilaterally.  Neck nontender, no masses.  Chest press sounds bilaterally decreased inspiration due to pain with tenderness over the right chest wall.   Back: Tender over lower back over bony prominence.   COR: regular rate and rhythm, no murmur.  ABD: Small area 2 x 2 cm of remote ecchymosis to the right side of the umbilicus, mild tenderness to deep palpation over the left lower quadrant.  Extremity: No long bone deformities, no ecchymosis.  Neuro: Grossly normal motor and sensory of all four extremities.    Imaging:     CHEST  X-Ray: 9/4/2023.  IMPRESSION: The heart is at the upper limits of normal. Bibasilar subsegmental atelectasis is  present. No pneumothorax is identified. Mildly displaced fractures of the right seventh through ninth ribs anterolaterally.     A/P: The patient is a 63-year-old female who fell. The patient has multiple right-side rib fractures; admit to the Trauma Service to achieve multi-modal pain control.  The patient with multiple prior medical conditions and will be address by the Trauma Service.      Tay León MD, PhD

## 2023-09-04 NOTE — PROVIDER NOTIFICATION
High SBP and trops reported to trauma primary care.   Home meds for HTN ordered and given.  Will continue to monitor status.

## 2023-09-04 NOTE — CONSULTS
[facility]  Neurology Consultation    Patient Name:  Isabell Matthews  MRN:  [id number]    :  1960  Date of Service:  2023  Primary care provider:  Bony Castaneda      Neurology consultation service was asked to see Isabell Matthews by Dr. Hull to evaluate seizure, unwitnessed fall, off keppra since 2023    Chief Complaint: Fall    History of Present Illness:   Isabell Matthews is a 63 year old female with history of unclear seizure disorders, T2DM, recurrent UTI, CKD, vascular vs Alzheimer's dementia, and previous stroke (right paramedian chuck and punctate right MCA suspected ESUS supposed to be on DAPT for 21 then asa alone and 3 years ago she had a left basal ganglia hemorrhagic infarct) who presents for evaluation after unwitnessed fall and reportedly stopped keppra 2023 but unable to confirm or discuss the reason. She was last seen by the neurology team as of 2023 when she presented with seizure like activity found to have new strokes. Per chart review she had seizure like activity in 2022 (and was admitted to neurology service at this time) with gaze deviation and shaking of all extremities with stiffness, and questionable seizure activity again on admission in May with no description or witness to that event and later her daughter reported for the May admission there was not seizure-like activity, but she was supposed to be on Keppra 500 mg BID at that time, but was not taking it. During her December admission a 24-hour EEG demonstrated no electrographic seizures or epileptiform discharges and Keppra 500 mg BID was resumed at discharge and she has not had follow up with neurology since.     Today, reportedly the patient fell in her bathroom and tipped the towel bar off the wall with no head strike of LOC but concern for rib fracture, and reportedly cause of fall due to unsteadiness.  Family reports no focal deficits and she is at baseline upon  arrival. On review of ED course her glucose on admission was found to be 48. CXR revealed displaced fractures of right sided 7-9 ribs. She has been admitted to the trauma service.       The patient was unable to provide much history of the events that occurred today.     The patient's daughter, Charli, is the patient's PCA and attempted to talk with her on the phone but on 2 attempts she did not answer.         ROS  A comprehensive ROS was performed and pertinent findings were included in HPI.     PMH  Past Medical History:   Diagnosis Date     Chronic pain      Coronary atherosclerosis 6/14/2016     Essential hypertension      Ex-cigarette smoker     quit 7/2018 over 35 years     Ex-cigarette smoker      Hyperlipidemia      Hypothyroid      Seizures (H)      Stroke (H)      Vascular dementia (H)      Past Surgical History:   Procedure Laterality Date     athroplasty hip Bilateral     2003, 2006     OTHER SURGICAL HISTORY      athroplasty hip     PICC DOUBLE LUMEN PLACEMENT Right 06/11/2023    right basilic 5 fr dl power picc 39 cm     STENT         Medications   I have personally reviewed the patient's medication list.     Allergies  I have personally reviewed the patient's allergy list.     Social History    She reports she lives with her daughter, otherwise Unable to get from the pt    Family History    Unable to obtain from pt      Physical Examination   Vitals: BP (!) 158/80   Pulse 83   Temp 97.6  F (36.4  C)   Resp 15   SpO2 96%   General: Lying in bed, NAD, holding right ribs with right arm  Head: NC/AT  Eyes: no icterus, op pink and moist  Cardiac: RRR on monitor, Extremities warm, no edema.   Respiratory: non-labored on RA  GI: S/NT/ND  Skin: No rash or lesion on exposed skin  Psych: Mood pleasant, affect congruent  Neuro:  Mental status: Awake, alert, attentive, oriented to self, city, hospital, age, but not  circumstance.  Able to follow simple commands, naming and repetition intact.   Cranial  nerves: Pupils 3 mm and symmetric, conjugate gaze, EOMI, facial sensation intact, face symmetric, shoulder shrug strong, tongue/uvula midline, no dysarthria.   Motor: Normal bulk and tone. No abnormal movements. Strength antigravity but limited testing due to any movements causing pain in her right ribs, able to hold arms antigraavity and straighten legs out but right leg movements caused significant pain.   Reflexes: Normo-reflexic and symmetric biceps, brachioradialis, triceps, patellae, and achilles. no clonus, toes down going  Sensory: Intact to light touch in proximal and distal aspects of all 4 extremities   Coordination: pt did not want to move right arm or do any movements due to pain at ribs  Gait: deferred    Investigations   I have personally reviewed pertinent labs, tests, and radiological imaging. Discussion of notable findings is included under Impression.       5/2023 CTA:    1. Head CTA demonstrates focal severe stenosis at the right M1/M2  junction. Mild stenosis at the left M1 division M2 junction.  2. Neck CTA demonstrates no stenosis of the major cervical arteries                                                                   MRI without contrast 5/20/2023  1. Acute infarcts within the right paramedian chuck and right  parasagittal frontal lobe.  2. No abnormal signal within the medial temporal lobes. Moderate  temporal predominant volume loss and chronic small vessel ischemic  disease.  3. Contrast portion of the exam was unable to be completed due to  patient intolerance.     12/12/2022- SUMMARY OF 3 DAYS OF VIDEO-EEG MONITORING:         During 3 days of VEEG monitoring, the EEG recording in non-sedated stupor was abnormal due to absence of wake-sleep cycling, and to continuous generalized delta-theta slowing.  No interictal epileptiform abnormalities and no electrographic seizures were recorded during the 3 day of monitoring, including during periods marked for tremors.       These findings  indicate moderate-severe electrographic encephalopathy.    This recording excludes non-convulsive status epilepticus as a possible cause of this encephalopathy.  Clinical correlation is recommended.   Singh Edmonds M.D., Professor of Neurology     Impression  Isabell Matthews is a 63 year old female with history of epilepsy, T2DM, recurrent UTI, CKD, vascular vs Alzheimer's dementia, and previous strokes who presents after a fall, known to the neurology team last seen as of 5/2023 when she presented with seizure like activity that was later confirmed to not be true seizure activity found to have new strokes, but was to remain on keppra until outpatient workup which did not occur. She presents now after a fall with new rib fractures and is admitted to the trauma service. She has had extenive previous workup for seizure activity, for which is confirmed to have only occurred 1x in Dec 2022 (with chart review description concerning for seizure seminology) per daughter, at which time EEG was unrevealing, but she was to remain on Keppra after recent admission due to new strokes and encephalopathy with likely plan of tapering off as an outpatient, for which she did not follow up. From chart review it appears she has been on keppra since 2020 for possible seizure activity from lacunar strokes, and she does have a history of dementia, both of which increase her risk of seizure. Her exam is non focal but limited by pain in the right ribs, and her family reports she is back to baseline. There was no report of seizure like activity or post ictal period to suggest seizure activity cause of this event. Considering she has been on keppra for many years for prior unspecific seizure activity, previously suspected secondary to prior strokes and also at risk with dementia, and seminology in December that is written with gaze preference is concerning for seizure activity for which EEG may not capture, and based on chart review would  continue AED with uncertain if discontinued, unable to clarify the history directly if and why discontinued.     Recommendations  -Continue Keppra 500 mg BID as prior   -Follow up with neurology outpatient and can discuss alternatives to keppra     No further neurologic workup inpatient.     Thank you for involving Neurology in the care of Isabell Matthews.  Please do not hesitate to call with questions/concerns (consult pager [phone]).      Patient was discussed with .    Valerie Cardenas DO, SHAVONNE  Neurology Resident, PGY-4

## 2023-09-04 NOTE — ED TRIAGE NOTES
Patient presents to ED with family with CC of Fall. Patient was in bathroom and was grabbing for the towel bar due to feeling unsteady and fell back in between toilet and tub and hit right rib cage area.  Hx of dimentia and previous stroke and left sided deficits .  Denies hitting head or any LOC. Patient appears pale.

## 2023-09-04 NOTE — PROGRESS NOTES
Hospital lab called with critical value of glucose 48.  Dr Hull informed and 2 containers of apple juice taken to pt room.

## 2023-09-04 NOTE — PROGRESS NOTES
BP (!) 190/82 (BP Location: Right arm)   Pulse 82   Temp 97.6  F (36.4  C) (Oral)   Resp 16   Wt 59.7 kg (131 lb 9.8 oz)   SpO2 97%   BMI 21.90 kg/m     Afebrile, high SBP, other vss.   Admitted from ED at 1115.   Alert and oriented to self only.  S/p right multiple rib fractures due to fall at home.  Admitted/transferred from: ED.  2 RN  skin assessment completed by Carrillo Light RN and Michelle FOY RN.  Skin assessment finding: No open areas on skin. No skin issues.   Interventions/actions: Keep skin clear & free of moisture.     Bedside Emergency Equipment Present:  Suction Regulator: yes.  Suction Canister: yes.  Tubing between Regulator and Canister: yes.  O2 Regulator with Tree: yes.  Ambu Bag: yes.

## 2023-09-05 ENCOUNTER — APPOINTMENT (OUTPATIENT)
Dept: SPEECH THERAPY | Facility: CLINIC | Age: 63
DRG: 184 | End: 2023-09-05
Attending: NURSE PRACTITIONER
Payer: COMMERCIAL

## 2023-09-05 ENCOUNTER — APPOINTMENT (OUTPATIENT)
Dept: GENERAL RADIOLOGY | Facility: CLINIC | Age: 63
DRG: 184 | End: 2023-09-05
Attending: NURSE PRACTITIONER
Payer: COMMERCIAL

## 2023-09-05 ENCOUNTER — APPOINTMENT (OUTPATIENT)
Dept: PHYSICAL THERAPY | Facility: CLINIC | Age: 63
DRG: 184 | End: 2023-09-05
Attending: NURSE PRACTITIONER
Payer: COMMERCIAL

## 2023-09-05 LAB
ANION GAP SERPL CALCULATED.3IONS-SCNC: 10 MMOL/L (ref 7–15)
BUN SERPL-MCNC: 40.4 MG/DL (ref 8–23)
CALCIUM SERPL-MCNC: 8.9 MG/DL (ref 8.8–10.2)
CHLORIDE SERPL-SCNC: 109 MMOL/L (ref 98–107)
CREAT SERPL-MCNC: 1.56 MG/DL (ref 0.51–0.95)
DEPRECATED HCO3 PLAS-SCNC: 23 MMOL/L (ref 22–29)
GFR SERPL CREATININE-BSD FRML MDRD: 36 ML/MIN/1.73M2
GLUCOSE BLDC GLUCOMTR-MCNC: 144 MG/DL (ref 70–99)
GLUCOSE BLDC GLUCOMTR-MCNC: 181 MG/DL (ref 70–99)
GLUCOSE BLDC GLUCOMTR-MCNC: 264 MG/DL (ref 70–99)
GLUCOSE BLDC GLUCOMTR-MCNC: 328 MG/DL (ref 70–99)
GLUCOSE BLDC GLUCOMTR-MCNC: 367 MG/DL (ref 70–99)
GLUCOSE BLDC GLUCOMTR-MCNC: 401 MG/DL (ref 70–99)
GLUCOSE BLDC GLUCOMTR-MCNC: 46 MG/DL (ref 70–99)
GLUCOSE BLDC GLUCOMTR-MCNC: 79 MG/DL (ref 70–99)
GLUCOSE SERPL-MCNC: 51 MG/DL (ref 70–99)
MAGNESIUM SERPL-MCNC: 2.2 MG/DL (ref 1.7–2.3)
PHOSPHATE SERPL-MCNC: 4.3 MG/DL (ref 2.5–4.5)
POTASSIUM SERPL-SCNC: 4 MMOL/L (ref 3.4–5.3)
SODIUM SERPL-SCNC: 142 MMOL/L (ref 136–145)
TSH SERPL DL<=0.005 MIU/L-ACNC: 2.86 UIU/ML (ref 0.3–4.2)

## 2023-09-05 PROCEDURE — 97116 GAIT TRAINING THERAPY: CPT | Mod: GP

## 2023-09-05 PROCEDURE — 84100 ASSAY OF PHOSPHORUS: CPT | Performed by: NURSE PRACTITIONER

## 2023-09-05 PROCEDURE — 250N000013 HC RX MED GY IP 250 OP 250 PS 637: Performed by: NURSE PRACTITIONER

## 2023-09-05 PROCEDURE — 120N000002 HC R&B MED SURG/OB UMMC

## 2023-09-05 PROCEDURE — 71045 X-RAY EXAM CHEST 1 VIEW: CPT

## 2023-09-05 PROCEDURE — 71045 X-RAY EXAM CHEST 1 VIEW: CPT | Mod: 26 | Performed by: RADIOLOGY

## 2023-09-05 PROCEDURE — 250N000011 HC RX IP 250 OP 636: Performed by: NURSE PRACTITIONER

## 2023-09-05 PROCEDURE — 92610 EVALUATE SWALLOWING FUNCTION: CPT | Mod: GN

## 2023-09-05 PROCEDURE — 97530 THERAPEUTIC ACTIVITIES: CPT | Mod: GP

## 2023-09-05 PROCEDURE — 99223 1ST HOSP IP/OBS HIGH 75: CPT | Performed by: PHYSICIAN ASSISTANT

## 2023-09-05 PROCEDURE — 97161 PT EVAL LOW COMPLEX 20 MIN: CPT | Mod: GP

## 2023-09-05 PROCEDURE — 99232 SBSQ HOSP IP/OBS MODERATE 35: CPT | Mod: GC | Performed by: ANESTHESIOLOGY

## 2023-09-05 PROCEDURE — 36415 COLL VENOUS BLD VENIPUNCTURE: CPT | Performed by: NURSE PRACTITIONER

## 2023-09-05 PROCEDURE — 80048 BASIC METABOLIC PNL TOTAL CA: CPT | Performed by: NURSE PRACTITIONER

## 2023-09-05 PROCEDURE — 83735 ASSAY OF MAGNESIUM: CPT | Performed by: NURSE PRACTITIONER

## 2023-09-05 PROCEDURE — 250N000012 HC RX MED GY IP 250 OP 636 PS 637: Performed by: NURSE PRACTITIONER

## 2023-09-05 PROCEDURE — 84443 ASSAY THYROID STIM HORMONE: CPT | Performed by: NURSE PRACTITIONER

## 2023-09-05 RX ORDER — NALOXONE HYDROCHLORIDE 0.4 MG/ML
0.2 INJECTION, SOLUTION INTRAMUSCULAR; INTRAVENOUS; SUBCUTANEOUS
Status: DISCONTINUED | OUTPATIENT
Start: 2023-09-05 | End: 2023-09-07 | Stop reason: HOSPADM

## 2023-09-05 RX ORDER — LIDOCAINE 4 G/G
1-3 PATCH TOPICAL
Status: DISCONTINUED | OUTPATIENT
Start: 2023-09-05 | End: 2023-09-05

## 2023-09-05 RX ORDER — DEXTROSE MONOHYDRATE 100 MG/ML
INJECTION, SOLUTION INTRAVENOUS CONTINUOUS PRN
Status: DISCONTINUED | OUTPATIENT
Start: 2023-09-05 | End: 2023-09-07 | Stop reason: HOSPADM

## 2023-09-05 RX ORDER — NICOTINE POLACRILEX 4 MG
15-30 LOZENGE BUCCAL
Status: DISCONTINUED | OUTPATIENT
Start: 2023-09-05 | End: 2023-09-05

## 2023-09-05 RX ORDER — ASPIRIN 81 MG/1
81 TABLET ORAL DAILY
Status: DISCONTINUED | OUTPATIENT
Start: 2023-09-06 | End: 2023-09-07 | Stop reason: HOSPADM

## 2023-09-05 RX ORDER — HYDRALAZINE HYDROCHLORIDE 20 MG/ML
10 INJECTION INTRAMUSCULAR; INTRAVENOUS EVERY 6 HOURS PRN
Status: DISCONTINUED | OUTPATIENT
Start: 2023-09-05 | End: 2023-09-07 | Stop reason: HOSPADM

## 2023-09-05 RX ORDER — LIDOCAINE 4 G/G
1-3 PATCH TOPICAL
Status: DISCONTINUED | OUTPATIENT
Start: 2023-09-05 | End: 2023-09-07 | Stop reason: HOSPADM

## 2023-09-05 RX ORDER — GABAPENTIN 100 MG/1
100 CAPSULE ORAL 3 TIMES DAILY
Status: DISCONTINUED | OUTPATIENT
Start: 2023-09-05 | End: 2023-09-05

## 2023-09-05 RX ORDER — NALOXONE HYDROCHLORIDE 0.4 MG/ML
0.4 INJECTION, SOLUTION INTRAMUSCULAR; INTRAVENOUS; SUBCUTANEOUS
Status: DISCONTINUED | OUTPATIENT
Start: 2023-09-05 | End: 2023-09-07 | Stop reason: HOSPADM

## 2023-09-05 RX ORDER — HEPARIN SODIUM 5000 [USP'U]/.5ML
5000 INJECTION, SOLUTION INTRAVENOUS; SUBCUTANEOUS EVERY 8 HOURS
Status: DISCONTINUED | OUTPATIENT
Start: 2023-09-05 | End: 2023-09-07 | Stop reason: HOSPADM

## 2023-09-05 RX ORDER — GABAPENTIN 100 MG/1
100 CAPSULE ORAL AT BEDTIME
Status: DISCONTINUED | OUTPATIENT
Start: 2023-09-05 | End: 2023-09-07 | Stop reason: HOSPADM

## 2023-09-05 RX ORDER — METHOCARBAMOL 750 MG/1
750 TABLET, FILM COATED ORAL EVERY 6 HOURS
Status: DISCONTINUED | OUTPATIENT
Start: 2023-09-05 | End: 2023-09-07 | Stop reason: HOSPADM

## 2023-09-05 RX ORDER — DEXTROSE MONOHYDRATE 100 MG/ML
INJECTION, SOLUTION INTRAVENOUS CONTINUOUS
Status: DISCONTINUED | OUTPATIENT
Start: 2023-09-05 | End: 2023-09-05

## 2023-09-05 RX ORDER — GABAPENTIN 100 MG/1
100 CAPSULE ORAL AT BEDTIME
Status: DISCONTINUED | OUTPATIENT
Start: 2023-09-05 | End: 2023-09-05

## 2023-09-05 RX ORDER — LANOLIN ALCOHOL/MO/W.PET/CERES
1000 CREAM (GRAM) TOPICAL DAILY
Status: DISCONTINUED | OUTPATIENT
Start: 2023-09-05 | End: 2023-09-07 | Stop reason: HOSPADM

## 2023-09-05 RX ORDER — LEVETIRACETAM 250 MG/1
500 TABLET ORAL 2 TIMES DAILY
Status: DISCONTINUED | OUTPATIENT
Start: 2023-09-05 | End: 2023-09-07 | Stop reason: HOSPADM

## 2023-09-05 RX ORDER — HYDROMORPHONE HCL IN WATER/PF 6 MG/30 ML
0.2 PATIENT CONTROLLED ANALGESIA SYRINGE INTRAVENOUS
Status: DISCONTINUED | OUTPATIENT
Start: 2023-09-05 | End: 2023-09-07 | Stop reason: HOSPADM

## 2023-09-05 RX ORDER — DEXTROSE MONOHYDRATE 25 G/50ML
25-50 INJECTION, SOLUTION INTRAVENOUS
Status: DISCONTINUED | OUTPATIENT
Start: 2023-09-05 | End: 2023-09-05

## 2023-09-05 RX ADMIN — CARVEDILOL 12.5 MG: 12.5 TABLET, FILM COATED ORAL at 07:53

## 2023-09-05 RX ADMIN — HEPARIN SODIUM 5000 UNITS: 5000 INJECTION, SOLUTION INTRAVENOUS; SUBCUTANEOUS at 12:06

## 2023-09-05 RX ADMIN — OXYCODONE HYDROCHLORIDE 2.5 MG: 5 TABLET ORAL at 21:05

## 2023-09-05 RX ADMIN — AMLODIPINE BESYLATE 5 MG: 5 TABLET ORAL at 07:53

## 2023-09-05 RX ADMIN — Medication 125 MCG: at 12:04

## 2023-09-05 RX ADMIN — DOXAZOSIN 1 MG: 1 TABLET ORAL at 09:17

## 2023-09-05 RX ADMIN — DICLOFENAC SODIUM 2 G: 10 GEL TOPICAL at 12:04

## 2023-09-05 RX ADMIN — SENNOSIDES AND DOCUSATE SODIUM 1 TABLET: 50; 8.6 TABLET ORAL at 09:22

## 2023-09-05 RX ADMIN — CLOPIDOGREL BISULFATE 75 MG: 75 TABLET ORAL at 09:19

## 2023-09-05 RX ADMIN — METHOCARBAMOL 750 MG: 750 TABLET ORAL at 18:03

## 2023-09-05 RX ADMIN — HEPARIN SODIUM 5000 UNITS: 5000 INJECTION, SOLUTION INTRAVENOUS; SUBCUTANEOUS at 21:04

## 2023-09-05 RX ADMIN — ACETAMINOPHEN 975 MG: 325 TABLET, FILM COATED ORAL at 09:15

## 2023-09-05 RX ADMIN — GABAPENTIN 100 MG: 100 CAPSULE ORAL at 12:04

## 2023-09-05 RX ADMIN — GABAPENTIN 100 MG: 100 CAPSULE ORAL at 21:05

## 2023-09-05 RX ADMIN — CYANOCOBALAMIN TAB 1000 MCG 1000 MCG: 1000 TAB at 12:04

## 2023-09-05 RX ADMIN — DICLOFENAC SODIUM 2 G: 10 GEL TOPICAL at 21:12

## 2023-09-05 RX ADMIN — OXYCODONE HYDROCHLORIDE 5 MG: 5 TABLET ORAL at 08:48

## 2023-09-05 RX ADMIN — LEVOTHYROXINE SODIUM 75 MCG: 75 TABLET ORAL at 09:19

## 2023-09-05 RX ADMIN — DULOXETINE HYDROCHLORIDE 20 MG: 20 CAPSULE, DELAYED RELEASE ORAL at 09:20

## 2023-09-05 RX ADMIN — CARVEDILOL 12.5 MG: 12.5 TABLET, FILM COATED ORAL at 18:04

## 2023-09-05 RX ADMIN — LIDOCAINE PATCH 4% 1 PATCH: 40 PATCH TOPICAL at 12:06

## 2023-09-05 RX ADMIN — INSULIN ASPART 5 UNITS: 100 INJECTION, SOLUTION INTRAVENOUS; SUBCUTANEOUS at 12:15

## 2023-09-05 RX ADMIN — ATORVASTATIN CALCIUM 40 MG: 40 TABLET, FILM COATED ORAL at 21:05

## 2023-09-05 RX ADMIN — ACETAMINOPHEN 975 MG: 325 TABLET, FILM COATED ORAL at 18:05

## 2023-09-05 RX ADMIN — LIDOCAINE PATCH 4% 1 PATCH: 40 PATCH TOPICAL at 09:14

## 2023-09-05 RX ADMIN — LEVETIRACETAM 500 MG: 250 TABLET, FILM COATED ORAL at 09:17

## 2023-09-05 RX ADMIN — LEVETIRACETAM 500 MG: 250 TABLET, FILM COATED ORAL at 21:05

## 2023-09-05 ASSESSMENT — ACTIVITIES OF DAILY LIVING (ADL)
ADLS_ACUITY_SCORE: 56
DEPENDENT_IADLS:: CLEANING;COOKING;LAUNDRY;SHOPPING;MEAL PREPARATION;MEDICATION MANAGEMENT;TRANSPORTATION;MONEY MANAGEMENT;INCONTINENCE
ADLS_ACUITY_SCORE: 56

## 2023-09-05 NOTE — CONSULTS
NEW INPATIENT DIABETES MANAGEMENT CONSULT  Isabell Matthews  Age: 63 year old  MRN # [id number]   YOB: 1960    Chief Complaint: Post fall, ribs fractured  Reason for Consult: Hx DM1 with episodes of hypoglycemia   Consulting Provider: Dasha Riggs       All information gathered via chart review in [facility] and [facility] everywhere and face-to-face interview and assessment.  Tried calling daughter and unable to reach them.  Professional  utilized --> No    History of Present Illness:   Isabell Matthews is a 63 year old female with history of epilepsy, T2 IDDM ( treated as a type 1), HLD, HTN, CAD post PCI,recurrent UTI, CKD, vascular vs Alzheimer's dementia, and previous stroke. On 9/4/2023 reportedly the patient fell in her bathroom and tipped the towel bar off the wall with no head strike of LOC but concern for rib fracture, and reportedly cause of fall due to unsteadiness.  Family reports no focal deficits and she is at baseline upon arrival.  Her glucose on admission was found to be 48. CXR revealed displaced fractures of right sided 7-9 ribs. She has been admitted to the trauma service     Diabetes Focus:  Unable to verify information as called daughter Mel twice and daughter Felton once and unable to reach. Patient confused and sleepy today. She is slow to answer and closes her eyes.  Patient was last in the hospital on 6/18/2023 with DKA.  She was discharged on Lantus 14 units, 1:20 CHO coverage and medium sliding scale.  She was supposed to see Mini Esteves virtual visit 6/2023 and it was a no show.  She then had another appointment with EMEKA Figueroa 7/2023 that she did not make.  Isabell was admitted with a BG=48 in the ER on 9/4/2023. She does not remember if she had Lantus on 9/4/2023.  BG=51 this am.  She was treated.  Now she has a JN=867 but this is after she ate.  BG climb to 401.  RN does not think she was eating but she was still working on breakfast. One of the last visit her daughter said  "she is a very brittle/labile diabetic.  After discussing with the RN-->  she had rice cereal in the room that she was eating but was holding the bowel and asleep.  Not really eating or drinking a lot. Discussed this with RN.  Reviewed labs: not currently in DKA:  C02=23, creat=1.56, GFR=36, AG=10.    Review BG:      Other Active Medical Problems:  Alzheimer/dementia, food insecurity  Diabetes Mellitus Type: 2011-\"She presented  overnight on April 3, 2011, due to increasing nausea, vomiting, and was found to be in diabetic ketoacidosis. She was admitted to the intensive care unit, put on an insulin drip, and was followed by Dr. Parry, endocrinology. After the resolution of her ketoacidosis, she was started on an insulin regimen with Lantus and NovoLog and was told that she has type 1 diabetes and will always require insulin   No DEMETRIA antibody or c peptide found on review  Duration:    40 years  Diabetic Complications: Peripheral neuropathy, nephropathy, CAD, stroke, no amputation, denies neuropathy   Prior to Admission Diabetes Regimen:      Lantus 14 units, 1:20 CHO coverage and medium sliding scale  per EPIC, unable to verify with family  brittle Diabetic\"  Can go from 170 to 40 in 1 hour,   BG monitor: unable to find this information  Diet: unable to find out this information  Usual BG control PTA:  sub optimal control, with A1c 9.3  on 9/4/2023  History of DKA: Yes, last  6/15/2023  and before that on 5/2022 admitted in DKA   Able to Detect Hypoglycemia: impaired due to dementia/dysphagia   Usual Diabetes Care Provider: Dr Castaneda   Primary Care Provider: Bony Castaneda  Factors Impacting IP Glucose Control: cognitive impairment  Current Diet: pureed diet with thin liquids    10 point ROS completed with pertinent positives and negatives noted in the HPI  Past medical, family and social histories are reviewed and updated.    Social History    Tobacco: Former smoker    Alcohol: not currently    Marital Status: "     Place of Residence: Lives with daughter    Occupation: did not ask  Family History   No diabetes documented in family    PMH:  systemic hypertension, hyperlipidemia, type 2 diabetes treated as Type 1, chronic kidney disease, hyperglycemia, hypothyroidism, vascular dementia, seizures, and prior strokes     Physical Exam   BP (!) 141/71 (BP Location: Left arm)   Pulse 79   Temp 97.7  F (36.5  C) (Oral)   Resp 16   Wt 59.7 kg (131 lb 9.8 oz)   SpO2 95%   BMI 21.90 kg/m    General: opens eyes to voice and looks at you, answered a question  HEENT: normocephalic, atraumatic. Oral mucous membranes dry  Lungs: unlabored respiration, no cough, remains on room air  ABD: rounded, non distended, soft nontender  Skin: warm and dry, no obvious lesions on exposed skin  MSK:  moves all extremities  Lymp:  some LE edema   Mental status:  follow commands, squeezes hand and wiggles toes but disoriented to, place, time, situation   Psych:   calm      Most Recent Laboratory Tests:  Recent Labs   Lab 09/04/23 0228   HGB 11.0*     Recent Labs   Lab 09/04/23 0228   A1C 9.3*     Recent Labs   Lab 09/05/23  0645   CR 1.56*     Recent Labs   Lab 09/05/23  1208 09/05/23  0906 09/05/23  0831 09/05/23  0815 09/05/23  0645 09/04/23 2126   * 144* 79 46* 51* 212*     ROUTINE IP LABS (Last four results)  BMP  Recent Labs   Lab 09/05/23 1208 09/05/23  0906 09/05/23  0831 09/05/23  0815 09/05/23  0645 09/04/23  0407 09/04/23 0228   NA  --   --   --   --  142  --  141   POTASSIUM  --   --   --   --  4.0  --  4.3   CHLORIDE  --   --   --   --  109*  --  107   VERONICA  --   --   --   --  8.9  --  10.0   CO2  --   --   --   --  23 -- 23   BUN  --   --   --   --  40.4*  --  44.1*   CR  --   --   --   --  1.56*  --  1.64*   * 144* 79 46* 51*   < > 48*    < > = values in this interval not displayed.     CBC  Recent Labs   Lab 09/04/23  0228   WBC 8.7   RBC 3.86   HGB 11.0*   HCT 36.0   MCV 93   MCH 28.5   MCHC 30.6*   RDW  14.5        INR  Recent Labs   Lab 09/04/23  0228   INR 0.98       Assessment:   Isabell Matthews is a 63 year old female with history of epilepsy, T2 IDDM ( treated as a type 1), HLD, HTN, hypothyroid,CAD post PCI,recurrent UTI, CKD, vascular vs Alzheimer's dementia, and previous stroke. On 9/4/2023 reportedly the patient fell in her bathroom and tipped the towel bar off the wall with no head strike of LOC but concern for rib fracture, and reportedly cause of fall due to unsteadiness.  Family reports no focal deficits and she is at baseline upon arrival.  Her glucose on admission was found to be 48. CXR revealed displaced fractures of right sided 7-9 ribs. She has been admitted to the trauma service.     Assessment:   1.  Type 2 insulin dependent, sub optimally controlled, OzsU3q=2.3   2. EVON on CKI, creat=1.39  3. Anemia  4. hypoglycemia   Plan:    -Decrease Lantus 5 units daily at 22:00   -May need D10 starting at 10 ml-20 ml to support her insulin, to keep BG >100 in the morning.  Since we are treating her like a type 1 diabetic we cannot not give her insulin.   -Decrease medium sliding scale to Low sliding scale ( 1/100) with meals and bedtime   -Novolog 1:25 CHO coverage with meals and snacks   -Novolog Low sliding scale   -BG monitoring TID AC, HS, 0200 and 5 am   -hypoglycemia protocol   -recommend diet with carb counting protocol   -diabetes education needs will be assessed closer to discharge   -on discharge, will recommend outpatient follow up with [facility] Endocr[facility]ology service     Discussed plan of care with patient in person but not responsive, nursing in person, and primary team by phone  Thank you for this consult; Inpatient Diabetes will continue to follow.     To contact Endocrine Diabetes service:   From 8AM-4PM: page inpatient diabetes provider that is following the patient  For questions or updates from 4PM-8AM: page the diabetes job code for on call fellow: [phone]        Review of prior  external note(s) from - [facility] or Care Everywhere   80 minutes spent on the date of the encounter doing chart review, history and exam, documentation and further activities per the note     Over 50% of my time on the unit was spent counseling the patient and/or coordinating care regarding acute hyperglycemia management.  See note for details.         Marisela Dominguez PA-C  Inpatient Diabetes Service  Pager   [phone]  Date of service 9/5/2023

## 2023-09-05 NOTE — PROGRESS NOTES
"   09/05/23 1000   Appointment Info   Signing Clinician's Name / Credentials (PT) Fabio Ley, PT, DPT   Rehab Comments (PT) monitor cognition, multiple fx of R ribs   Living Environment   People in Home child(tiffany), adult   Current Living Arrangements apartment   Home Accessibility no concerns   Transportation Anticipated family or friend will provide   Living Environment Comments pt lives with her 30 year old daughter. Inermittently confused and unable to provide information about living environemnt.Per chart, pt lives in an apartment with no stair concerns. Has elevator access.   Self-Care   Usual Activity Tolerance moderate   Current Activity Tolerance poor   Regular Exercise No   Equipment Currently Used at Home grab bar, toilet;walker, standard   Fall history within last six months yes   Number of times patient has fallen within last six months 1   Activity/Exercise/Self-Care Comment pt reports being IND at baseline with all mobility but per chart review : \"Per daughter she has right upper and lower extremities are weaker secondary to prior stroke, uses a walker for about 20ft otherwise wheelchair for longer distances.\"   General Information   Onset of Illness/Injury or Date of Surgery 09/04/23   Referring Physician Tay León MD   Patient/Family Therapy Goals Statement (PT) to stay stronger   Pertinent History of Current Problem (include personal factors and/or comorbidities that impact the POC) per EMR:Isabell Matthews is a 63 year old female with history of epilepsy, T2DM, recurrent UTI, CKD, vascular vs Alzheimer's dementia, and previous stroke. Today, reportedly the patient fell in her bathroom and tipped the towel bar off the wall with no head strike of LOC but concern for rib fracture, and reportedly cause of fall due to unsteadiness.  Family reports no focal deficits and she is at baseline upon arrival. On review of ED course her glucose on admission was found to be 48. CXR revealed displaced " fractures of right sided 7-9 ribs. She has been admitted to the trauma service.   Cognition   Affect/Mental Status (Cognition) confused   Orientation Status (Cognition) disoriented to;place;situation;time   Follows Commands (Cognition) over 90% accuracy   Pain Assessment   Patient Currently in Pain Yes, see Vital Sign flowsheet   Integumentary/Edema   Integumentary/Edema no deficits were identifed   Posture    Posture Protracted shoulders   Range of Motion (ROM)   Range of Motion ROM is WFL   Strength (Manual Muscle Testing)   Strength (Manual Muscle Testing) Deficits observed during functional mobility   Bed Mobility   Bed Mobility supine-sit   Comment, (Bed Mobility) Danny with bed mobility supine to sit   Transfers   Transfers sit-stand transfer   Comment, (Transfers) CGA with sit to stand   Gait/Stairs (Locomotion)   [place] Level (Gait) contact guard   Assistive Device (Gait) walker, front-wheeled   Comment, (Gait/Stairs) CGA with walker for gait.   Balance   Balance no deficits were identified   Sensory Examination   Sensory Perception patient reports no sensory changes   Coordination   Coordination no deficits were identified   Muscle Tone   Muscle Tone no deficits were identified   Clinical Impression   Criteria for Skilled Therapeutic Intervention Yes, treatment indicated   PT Diagnosis (PT) impaired functional mobility   Influenced by the following impairments weakness, deconditioning, cognition   Functional limitations due to impairments bed mobility, transfers, gait,   Clinical Presentation (PT Evaluation Complexity) Stable/Uncomplicated   Clinical Presentation Rationale clinician judgement and patient presentation   Clinical Decision Making (Complexity) low complexity   Planned Therapy Interventions (PT) balance training;bed mobility training;gait training;home exercise program;strengthening;stair training;transfer training;progressive activity/exercise;risk factor education;home program guidelines    Anticipated Equipment Needs at Discharge (PT) other (see comments)  (TBD)   Risk & Benefits of therapy have been explained evaluation/treatment results reviewed;care plan/treatment goals reviewed;risks/benefits reviewed;current/potential barriers reviewed;participants voiced agreement with care plan;participants included;patient   PT Total Evaluation Time   PT Eval, Low Complexity Minutes (22887) 9   Physical Therapy Goals   PT Frequency 5x/week   PT Predicted Duration/Target Date for Goal Attainment 09/12/23   PT Goals Bed Mobility;Transfers;Gait   PT: Bed Mobility Independent;Supine to/from sit   PT: Transfers Modified independent;Sit to/from stand;Assistive device   PT: Gait Modified independent;Assistive device;Greater than 200 feet   Interventions   Interventions Quick Adds Therapeutic Activity;Gait Training   Therapeutic Activity   Therapeutic Activities: dynamic activities to improve functional performance Minutes (48851) 25   Symptoms Noted During/After Treatment Fatigue   Treatment Detail/Skilled Intervention post subjective evaluation ,pt agreeable to OOB activity. Somnolent and lethargic but with redirection able to focus on task. Worked on bed mobility rolling with pt requiring Danny with rolling and cues provided for supine to sit transfer. Once upright sitting; worked on scooting to EOB with cues provided and bed lowered to closer to ground. Pt managed to scoot with Danny from therapist. Stood up once with CGA to walker. Pt then completed 10 sit to stands with CGA. Upon sitting; worked on seated balance exercises to improve sitting strength. Completed gait in room and due to fatigue returned to supine in bed.   Gait Training   Gait Training Minutes (82136) 8   Symptoms Noted During/After Treatment (Gait Training) fatigue   Treatment Detail/Skilled Intervention PT: to facilitate activity tolerance; pt ambulated CGA with walker from one side of bed to the other side of bed after rest break. Upon  standing; tend to have a posterior lean with therapist requiring CGA to ensure safety.Ambulates with slow and cautious step. Reports fatigue with in room gait.   PT Discharge Planning   PT Plan PT: gait with walker and chair follow, strengthening exercises, work on bed mobility   PT Discharge Recommendation (DC Rec) Transitional Care Facility   PT Rationale for DC Rec Pt is currently below baseline in terms of functional mobility. Requires Ax1 with all mobility and due to cognition will require 24/7 supervision. PT will continue to work with pt while admitted. TCU recommended to promote strength and activity tolerance.   PT Brief overview of current status Ax1, up in chair 3-4x/day, walking 4x/day   Total Session Time   Timed Code Treatment Minutes 33   Total Session Time (sum of timed and untimed services) 42

## 2023-09-05 NOTE — PROGRESS NOTES
[facility]   Clinical Swallow Evaluation   09/05/23 3136   Appointment Info   Signing Clinician's Name / Credentials (SLP) Sabina Finch MS CCC   General Information   Onset of Illness/Injury or Date of Surgery 09/04/23   Referring Physician Sharron Barrett APRN CNP   Patient/Family Therapy Goal Statement (SLP) None stated   Pertinent History of Current Problem Per provider documentation - patient fell in her bathroom and tipped the towel bar off the wall with no head strike of LOC but concern for rib fracture, and reportedly cause of fall due to unsteadiness.   General Observations Pt is alert and agreeable to evaluation. No family present. SLP order received d/t concerns for extensive cueing needed to eat and swallow   Type of Evaluation   Type of Evaluation Swallow Evaluation   Oral Motor   Oral Musculature generally intact   Structural Abnormalities none present   Mucosal Quality adequate   Dentition (Oral Motor)   Dentition (Oral Motor) adequate dentition   Vocal Quality/Secretion Management (Oral Motor)   Vocal Quality (Oral Motor) WFL   Secretion Management (Oral Motor) WNL   General Swallowing Observations   Past History of Dysphagia Pt is well known to our SLP services and is on a baseline diet of puree and thin liquids per previous documentation and assessment.   Respiratory Support (General Swallowing Observations) none   Current Diet/Method of Nutritional Intake (General Swallowing Observations, NIS) regular diet;thin liquids (level 0)   Swallowing Evaluation Clinical swallow evaluation   Clinical Swallow Evaluation   Feeding Assistance no assistance needed   Clinical Swallow Evaluation Textures Trialed thin liquids;pureed;solid foods   Clinical Swallow Eval: Thin Liquid Texture Trial   Mode of Presentation, Thin Liquids straw;self-fed   Volume of Liquid or Food Presented 4 oz   Oral Phase of Swallow WFL   Pharyngeal Phase of Swallow intact   Diagnostic  Statement No overt s/sx of aspiration   Clinical Swallow Evaluation: Puree Solid Texture Trial   Mode of Presentation, Puree spoon;self-fed   Volume of Puree Presented 2 oz   Oral Phase, Puree WFL   Pharyngeal Phase, Puree intact   Diagnostic Statement No overt s/sx of aspiration   Clinical Swallow Evaluation: Solid Food Texture Trial   Mode of Presentation self-fed   Volume Presented 1/2 cracker   Oral Phase impaired mastication;delayed AP movement;residue in oral cavity   Pharyngeal Phase intact   Diagnostic Statement Prolonged mastication and oral clearance time, nursing noting pt needing many cues. No overt s/sx of aspiration but oral residue and disorganized oral phase noted   Esophageal Phase of Swallow   Patient reports or presents with symptoms of esophageal dysphagia No   Swallowing Recommendations   Diet Consistency Recommendations pureed (level 4);thin liquids (level 0)   Supervision Level for Intake 1:1 supervision needed   Mode of Delivery Recommendations slow rate of intake   Swallowing Maneuver Recommendations alternate food and liquid intake;extra swallow   Monitoring/Assistance Required (Eating/Swallowing) stop eating activities when fatigue is present;cue for finger/lingual sweep if oral pocketing present;check mouth frequently for oral residue/pocketing;optimize oral intake to minimize need for tube feeding;monitor for cough or change in vocal quality with intake   Medication Administration Recommendations, Swallowing (SLP) As tolerated   Instrumental Assessment Recommendations instrumental evaluation not recommended at this time   Clinical Impression   Criteria for Skilled Therapeutic Interventions Met (SLP Eval) Evaluation only;Current level of function same as previous level of function   SLP Diagnosis Oropharyngeal dysphagia, currently at baseline   Risks & Benefits of therapy have been explained evaluation/treatment results reviewed;care plan/treatment goals reviewed;participants  included;patient   Clinical Impression Comments Pt seen for clinical swallow evaluation. She consumed all trials without overt s/sx of aspiration. Pt tolerated 1/2 cracker but mastication and oral clearance was very prolonged with verbal cues and extra time needed. Per previous documentation pt is on a puree diet at baseline. Oropharyngeal phases of swallow appear at baseline. Recommend pt continue a puree diet and thin liquids with supervision and assistance. Upright position, slow rate, and alternate solids and liquids. No further skilled SLP services warranted at this time.   SLP Total Evaluation Time   Eval: oral/pharyngeal swallow function, clinical swallow Minutes (45755) 10   SLP Discharge Planning   SLP Plan eval only

## 2023-09-05 NOTE — PLAN OF CARE
BP (!) 151/71 (BP Location: Right arm)   Pulse 75   Temp 97.7  F (36.5  C) (Oral)   Resp 16   Wt 59.7 kg (131 lb 9.8 oz)   SpO2 95%   BMI 21.90 kg/m      Status: R rib fractures  Activity: Ax1 to reposition, Ax2 + gb + walker  Neuros: A&Ox1, oriented only to self. Slow to respond verbally, inconsistently follows commands. Swallows pills safely but requires time, focus, and encouragement from nurse. Not impulsive, able to communicate needs. Calm and pleasant.  Cardiac: WDL, denies chest pain.  Respiratory: WDL on RA, denies SOB, but c/o sharp pain in ribs woth deep breaths or movement. IS in use, but not tolerated well.  GI/: +BS, urinary incontinence with AUOP using purewick.  Diet: Tolerating regular diet.  Skin/Incisions: WDL.  Lines/Drains: L PIV SL.  Pain/Nausea: No pain when at rest; denies nausea.  New Changes: No acute changes this shift.

## 2023-09-05 NOTE — PLAN OF CARE
RN [id number]:    Vital signs: /68 (BP Location: Right arm)   Pulse 87   Temp 97.6  F (36.4  C) (Oral)   Resp 18   Wt 59.7 kg (131 lb 9.8 oz)   SpO2 94%   BMI 21.90 kg/m      Status: 9/4 admit with closed fracture of multiple ribs of R side  Neuro: AOX4  Pain/Nausea: pain managed with PRN Oxycodone, denies nausea  Cardiac: WNL  Respiratory: sharp pain with deep breaths, not tolerating IS use very well and needs a lot  of redirection  Mobility: assist x 2 with walker and gait belt  Diet: puree and thin liquids  Labs: reviewed. BG low this morning and then high  LDAs: PIV SL  Skin/incisions: no new deficits found  GI/: incontinent B/B, purewick in place  Plan: continue with plan of care. Patient's daughter requesting to NOT go to TCU at discharge, wants patient to go home

## 2023-09-05 NOTE — UTILIZATION REVIEW
Admission Status; Secondary Review Determination       Under the authority of the Utilization Management Committee, the utilization review process indicated a secondary review on the above patient. The review outcome is based on review of the medical records, discussions with staff, and applying clinical experience noted on the date of the review.     (x) Inpatient Status Appropriate - This patient's medical care is consistent with medical management for inpatient care and reasonable inpatient medical practice.     RATIONALE FOR DETERMINATION   63-year-old female with a history of hypertension, hyperlipidemia, type 1 diabetes, chronic kidney disease, hyperglycemia, hypothyroidism, vascular dementia, seizures, and prior strokes presented to the ED after a fall in her bathroom. She grabbed a towel bar, causing it to come off the wall and fell between the toilet and the tub. She complained of right lateral rib cage pain, scored at 9/10. Her mental status was at baseline according to her family. Labs revealed a glucose level of 48 initially, which responded appropriately to oral intake, and a creatinine of 1.64 (baseline), along with an elevated high-sensitive troponin of 33. Imaging confirmed mildly displaced fractures of the right seventh through ninth ribs anterolaterally. Pain management with lidocaine patches was unsuccessful, leading to the administration of IV morphine. Considering her complex medical history and the presence of multiple rib fractures, the patient was admitted to the Trauma Service for multi-modal pain control and further evaluation, given her history of vascular dementia and other comorbidities.  Multiple rib fractures in elderly patients are associated with high morbidity and mortality, even without pulmonary contusions.The number of rib fractures in elderly is directly correlated with risk of complications like respiratory complications, and in-hospital mortality compared to younger  patients.  The expected length of stay at the time of admission was more than 2 nights because of the severity of illness, intensity of service provided, and risk for adverse outcome. Inpatient admission is appropriate.         This document was produced using voice recognition software       The information on this document is developed by the utilization review team in order for the business office to ensure compliance. This only denotes the appropriateness of proper admission status and does not reflect the quality of care rendered.   The definitions of Inpatient Status and Observation Status used in making the determination above are those provided in the CMS Coverage Manual, Chapter 1 and Chapter 6, section 70.4.   Sincerely,   BETTY MEDINA MD   System Medical Director   Utilization Management   [facility].

## 2023-09-05 NOTE — PROGRESS NOTES
Pain Service Progress Note  [facility]  Date: 09/05/2023       Patient Name: Isabell Matthews  MRN: [id number]  Age: 63 year old  Sex: female      Assessment:  Isabell Matthews is a 63 year old female with history of epilepsy, T2DM, recurrent UTI, CKD, vascular vs Alzheimer's dementia, and previous stroke. Today, reportedly the patient fell in her bathroom and tipped the towel bar off the wall with no head strike of LOC but concern for rib fracture, and reportedly cause of fall due to unsteadiness.  Family reports no focal deficits and she is at baseline upon arrival. On review of ED course her glucose on admission was found to be 48. CXR revealed displaced fractures of right sided 7-9 ribs. She has been admitted to the trauma service.        Procedure: none    Date of injury: 9/4/2023    Date of Catheter Placement: none currently    Plan/Recommendations:  1. Regional Anesthesia/Analgesia    Given the patients history of dementia she is unable to consent to any procedures for herself. We attempted to contact her primary care giver multiple times today as well as yesterday with no response. Given this our team is unable to offer her any regional anesthetic techniques for assistance in management of her rib pain. If her family is able to be contacted and feels that regional anesthesia would be an option for her, please reach back out to us.     2. Multimodal Analgesia   Recommend scheduling Tylenol 975mg  Would recommend avoiding morphine and preferentially using hydromorphone given more predictable pharmacology, if IV opiates are necessary.  Can use methocarbamol - would start PRN. Only schedule if she tolerates it well and is needing more analgesia, as it can be sedating  Agree with use of lidocaine patches    Pain Service will sign off.    Discussed with attending anesthesiologist    Steven William MD  Anesthesiology PGY-2  09/05/2023     Overnight Events: no major overnight  events    Tubes/Drains: No      Subjective:  it hurts a little bit   Nausea: No  Vomiting: No  Pruritus: No  Symptoms of LAST: No    Pain Location:  Right chest wall      Diet: Regular Diet Adult    Relevant Labs:  Recent Labs   Lab Test 09/05/23  0645 09/04/23 0228 05/03/22 0227 04/10/22  1441   INR  --  0.98   < > 0.93   PLT  --  241   < > 211   PTT  --   --   --  32   BUN 40.4* 44.1*   < > 27    < > = values in this interval not displayed.       Physical Exam:  Vitals: BP (!) 194/71 (BP Location: Right arm)   Pulse 79   Temp 97.7  F (36.5  C) (Oral)   Resp 16   Wt 59.7 kg (131 lb 9.8 oz)   SpO2 95%   BMI 21.90 kg/m      Physical Exam:   Orientation:  Alert, oriented, and in no acute distress: alert but not oriented which appears to be her baseline  Sedation: No    Motor Examination:  5/5 Strength in lower extremities: Yes    Sensory Level:   Decrease in sensation: No    Catheter Site:   Catheter entry site is clean/dry/intact: not applicable    Tender: Yes      Relevant Medications:  Current Pain Medications:  Medications related to Pain Management (From now, onward)      Start     Dose/Rate Route Frequency Ordered Stop    09/05/23 0800  Lidocaine (LIDOCARE) 4 % Patch 1 patch         1 patch  over 12 Hours Transdermal EVERY 24 HOURS 0800 09/04/23 0801 09/04/23 2200  gabapentin (NEURONTIN) capsule 100 mg         100 mg Oral AT BEDTIME 09/04/23 1158      09/04/23 1200  DULoxetine (CYMBALTA) DR capsule 20 mg         20 mg Oral DAILY 09/04/23 1158      09/04/23 0805  acetaminophen (TYLENOL) tablet 975 mg         975 mg Oral EVERY 8 HOURS 09/04/23 0801 09/04/23 0805  senna-docusate (SENOKOT-S/PERICOLACE) 8.6-50 MG per tablet 1-2 tablet         1-2 tablet Oral 2 TIMES DAILY 09/04/23 0801 09/04/23 0748  oxyCODONE IR (ROXICODONE) half-tab 2.5 mg        See Hyperspace for full Linked Orders Report.    2.5 mg Oral EVERY 4 HOURS PRN 09/04/23 0801 09/04/23 0748  oxyCODONE (ROXICODONE) tablet 5 mg   "      See Hyperspace for full Linked Orders Report.    5 mg Oral EVERY 4 HOURS PRN 09/04/23 0801      09/04/23 0746  methocarbamol (ROBAXIN) tablet 500 mg         500 mg Oral EVERY 6 HOURS PRN 09/04/23 0801 09/07/23 0745    09/04/23 0533  morphine (PF) injection 2 mg         2 mg Intravenous ONCE PRN 09/04/23 0533              Primary Service Contacted with Recommendations? Yes            Acute Inpatient Pain Service [facility]  Hours of pain coverage 24/7   Page via Amcom- Please Page the Pain Team Via Amcom: \"PAIN MANAGEMENT ACUTE INPATIENT/ [facility]\"            "

## 2023-09-05 NOTE — PROGRESS NOTES
[facility]  Trauma Service Tertiary  Note    Date of Service (when I saw the patient): 09/05/2023     Assessment & Plan   Trauma Mechanism: Ground level fall  Date of injury: 09/04/2023  Known Injuries:  Right sided displaced 7-9th rib fractures                   Neuro/Pain:  # Hx CVA, multifocal acute stroke (right centrum semiovale, right chuck)  # Hx epilepsy  # Vascular dementia  CT head  on admission without acute pathology. Per daughter she has right  upper and lower extremities are weaker secondary to prior stroke, uses a walker for about 20ft otherwise wheelchair for longer distances.  On exam, oriented to place and time, slow to respond.  Monitor neurological status. Delirium prevention and precautions.   Per Neurology note on admission continue Keppra 500mg BID, per recent discharge notes May 2023, completed 3 months of Clopidogrel + ASA 81mg, then transition to 81mg daily    # Acute pain   See Regional Anesthesia consult note  Scheduled: Tylenol, Lidoderm patches, Diclofenac gel, Gabapentin 100mg TID (increase from home dose)  PRN: Oxycodone for breakthrough pain    - Monitor neurological status. Delirium prevention and precautions.     Pulmonary:  # Acute traumatic rib fractures  Reports right sided spasms with deep breath, movement.  CXR with trace right effusion, stable on room air, IS upto 500  Pain control  Aggressive pulmonary hygience  Early mobility    Cardiovascular:    # Essential hypertension  # CAD, Hx MI ([facility] in 2004)  Poorly controlled with high am BP readings  - Continue PTA regimen: Amlodipine, Coreg, Cardura  - Added PRN Hydralazine for SBP >160  - HLD: PTA Atorvastatin    GI/Nutrition:    Pureed diet +thin liquids  Pockets food, need reminders to swallow    Fluids/Electrolytes:  # CKD 3  Creatinine is close to baseline  - electrolyte replacement protocol  In place.     Endocrine:  # Insulin dependent diabetes  # Intermittent hypoglycemia  PTA  regimen: Lantus 14units and Novolog with meals  2 episodes of hypoglycemia this admission treated to juice. Her home dose of Lantus was reduced to 7units at bedtime yesterday. Hbg A1C 9.3  - Will ensure oral intake is adequate  - Keep Medium correction scale for now  - Endocrine consulted    Infectious disease:   Obtain UA/UC  No indications for antibiotics.     Hematology:    # Platelet dysfunction 2/2 DAPT  - Hemoglobin 11.0. Monitor.   Threshold for transfusion if hgb less than 7.0 or signs/symptoms of hypoperfusion.       Musculoskeletal:  # Falls  # Weakness and deconditioning acute on chronic illness  - Physical and occupational therapy consults.    Lines/ tubes/ drains:  - PIV    General Cares:    PPI/H2 blocker:  n/a   DVT prophylaxis: Heparin SQ   Bowel Regimen/Date of last stool: PTA   Pulmonary toilet: IS   ETOH screen completed yes              Frailty Score: 2   Lines / drains: PIV    Code status:  Full code   Discharge goals:   Adequate pain management: pending  VSS x24 hours: No (hypertensive)  Hemoglobin stable x 48 hours:  Ambulating safely and/or therapy evals complete: pending  Drains/lines removed or plan in place to manage: PTA  Teaching done: yes   Other:  Expected D/C date: 2 days, pending pain control, therapy eval    Interval History   Grimacing in pain, right sided rib pain with deep breaths and cough. Denies other pain. Plan discussed with her daughter (Felton) over the phone today.  ROS x 8 negative with exception of those things listed in interval hx    Physical Exam   Temp: 97.7  F (36.5  C) Temp src: Oral BP: (!) 141/71 (gave morning BP meds and rechecked) Pulse: 79   Resp: 16 SpO2: 95 % O2 Device: None (Room air)    Vitals:    09/04/23 1106   Weight: 59.7 kg (131 lb 9.8 oz)     Vital Signs with Ranges  Temp:  [97.6  F (36.4  C)-97.9  F (36.6  C)] 97.7  F (36.5  C)  Pulse:  [75-84] 79  Resp:  [15-16] 16  BP: (131-204)/(59-82) 141/71  SpO2:  [95 %-100 %] 95 %  I/O last 3 completed  shifts:  In: 120 [P.O.:120]  Out: 2 [Urine:2]    [place] Coma Scale - Total 14/15  Eye Response (E): 4   4= spontaneous, 3= to verbal/voice, 2= to pain, 1= No response   Verbal Response (V): 4   5= Orientated, converses, 4= Confused, converses, 3= Inappropriate words, 2= Incomprehensible sounds, 1=No response   Motor Response (M): 6   6= Obeys commands, 5= Localizes to pain, 4= Withdrawal to pain, 3=Fexion to pain, 2= Extension to pain, 1= No response   Constitutional: Awake, alert, cooperative, no apparent distress, slow to respond  Eyes: PERRL  ENT: Normocephalic, atraumatic  Respiratory: No increased work of breathing, good air exchange, clear to auscultation bilaterally, no crackles or wheezing.  Cardiovascular:  regular rate and rhythm, normal S1 and S2  GI: Normal bowel sounds, abdomen soft, non-distended, non-tender, no guarding  Genitourinary:  yobani   Skin:  Normal skin color, no redness, warmth, or swelling, no ecchymosis, no abrasions, and no jaundice.  Musculoskeletal: right UE/LE weaker  Neurologic: Awake, alert, oriented to self  Neuropsychiatric: Calm, normal eye contact, alert    PEGGY Belcher CNP  To contact the trauma service use job code pager [phone],   Numeric texts or alpha text through AMCOM

## 2023-09-05 NOTE — CONSULTS
Care Management Initial Consult    General Information  Assessment completed with: Care Team Member, Children, DANY-chart review, Daughter- Mel (assessment completed with patients daughter discharge patient only being oriented to herself)  Type of CM/SW Visit: Initial Assessment    Primary Care Provider verified and updated as needed: Yes   Readmission within the last 30 days: no previous admission in last 30 days      Reason for Consult: discharge planning, other (see comments) (elevated readmission score)  Advance Care Planning:            Communication Assessment  Patient's communication style: spoken language (English or Bilingual)    Hearing Difficulty or Deaf: no   Wear Glasses or Blind: no    Cognitive  Cognitive/Neuro/Behavioral: .WDL except  Level of Consciousness: confused  Arousal Level: arouses to voice  Orientation: disoriented to, place, time, situation  Mood/Behavior: calm, cooperative  Best Language: 0 - No aphasia  Speech: slow    Living Environment:   People in home: child(tiffany), adult     Current living Arrangements: apartment      Able to return to prior arrangements: no       Family/Social Support:  Care provided by: child(tiffany)  Provides care for: no one, unable/limited ability to care for self  Marital Status: Single  Children- patient has two daughters Mel and Felton. Felton lives in [place].            Description of Support System: Supportive, Involved    Support Assessment: Caregiver experiencing high stress, Adequate family and caregiver support    Current Resources:   Patient receiving home care services: No     Community Resources: PCA-- Mel is one of the patients PCA's, patient also has another PCA through [facility] that comes into the home 30 hrs a week.     Equipment currently used at home: grab bar, toilet, walker, standard, wheelchair, manual  Supplies currently used at home:      Employment/Financial:  Employment Status: disabled, patient currently receiving social security  "disability        Financial Concerns: unable to afford rent/mortgage, unable to afford food     Felton said they are \"barely hanging on\" and have been looking into resources to help with supplies at home (i.e. adult diapers), food, housing and getting more help in the patients home. Mel works although her work schedule is inconsistent.     Referral to Financial Worker: No       Does the patient's insurance plan have a 3 day qualifying hospital stay waiver?  No    Lifestyle & Psychosocial Needs:  Social Determinants of Health     Tobacco Use: Medium Risk (6/11/2023)    Patient History     Smoking Tobacco Use: Former     Smokeless Tobacco Use: Never     Passive Exposure: Not on file   Alcohol Use: Not on file   Financial Resource Strain: Not on file   Food Insecurity: Not on file   Transportation Needs: Not on file   Physical Activity: Not on file   Stress: Not on file   Social Connections: Not on file   Intimate Partner Violence: Not on file   Depression: At risk (11/2/2020)    PHQ-2     PHQ-2 Score: 4   Housing Stability: Not on file       Functional Status:  Prior to admission patient needed assistance: patient is total cares at home.  Dependent ADLs:: Ambulation-walker, Wheelchair-with assist, Toileting, Incontinence, Dressing, Bathing, Transfers    Dependent IADLs:: Cleaning, Cooking, Laundry, Shopping, Meal Preparation, Medication Management, Transportation, Money Management, Incontinence  Assesssment of Functional Status: Needs placement in a SNF/TCF for rehabilitation    Mental Health Status:  Mental Health Status: Past Concern-- patient has a history of anxiety and depression. It is no longer a concern.       Chemical Dependency Status:  Chemical Dependency Status: No Current Concerns             Values/Beliefs:  Spiritual, Cultural Beliefs, Congregational Practices, Values that affect care:  did not ask               Additional Information:    SW completed assessment via phone because according to patients " bedside RN, patient is only alert to herself. Attempted to call patients daughter Mel and was unable to leave a VM. SW then called patients daughter Felton and completed CMA with Felton over the phone.     Patient lives in a senior living building ([facility] community) in an apartment with her daughter Mel. Mel is one of the patients PCA's, the patient also has another PCA that comes into the home 30 hrs a week. Spoke to Mel regarding discharge recommendations. Mel would like the medicare.gov list to be emailed to her at [email or web address]. SW emailed TCU list. SW will continue to follow for discharge planning.      CAMILO DonW   [address]    Phone: [phone]  Pager: [phone]

## 2023-09-06 ENCOUNTER — TELEPHONE (OUTPATIENT)
Dept: FAMILY MEDICINE | Facility: CLINIC | Age: 63
End: 2023-09-06

## 2023-09-06 ENCOUNTER — APPOINTMENT (OUTPATIENT)
Dept: PHYSICAL THERAPY | Facility: CLINIC | Age: 63
DRG: 184 | End: 2023-09-06
Payer: COMMERCIAL

## 2023-09-06 ENCOUNTER — APPOINTMENT (OUTPATIENT)
Dept: GENERAL RADIOLOGY | Facility: CLINIC | Age: 63
DRG: 184 | End: 2023-09-06
Attending: NURSE PRACTITIONER
Payer: COMMERCIAL

## 2023-09-06 VITALS
BODY MASS INDEX: 21.9 KG/M2 | TEMPERATURE: 98 F | RESPIRATION RATE: 16 BRPM | DIASTOLIC BLOOD PRESSURE: 71 MMHG | WEIGHT: 131.61 LBS | SYSTOLIC BLOOD PRESSURE: 151 MMHG | HEART RATE: 86 BPM | OXYGEN SATURATION: 91 %

## 2023-09-06 LAB
ALBUMIN UR-MCNC: 10 MG/DL
ANION GAP SERPL CALCULATED.3IONS-SCNC: 10 MMOL/L (ref 7–15)
APPEARANCE UR: ABNORMAL
BACTERIA #/AREA URNS HPF: ABNORMAL /HPF
BILIRUB UR QL STRIP: NEGATIVE
BUN SERPL-MCNC: 37.3 MG/DL (ref 8–23)
CALCIUM SERPL-MCNC: 8.9 MG/DL (ref 8.8–10.2)
CHLORIDE SERPL-SCNC: 106 MMOL/L (ref 98–107)
COLOR UR AUTO: ABNORMAL
CREAT SERPL-MCNC: 1.68 MG/DL (ref 0.51–0.95)
DEPRECATED HCO3 PLAS-SCNC: 23 MMOL/L (ref 22–29)
GFR SERPL CREATININE-BSD FRML MDRD: 33 ML/MIN/1.73M2
GLUCOSE BLDC GLUCOMTR-MCNC: 268 MG/DL (ref 70–99)
GLUCOSE BLDC GLUCOMTR-MCNC: 451 MG/DL (ref 70–99)
GLUCOSE BLDC GLUCOMTR-MCNC: 464 MG/DL (ref 70–99)
GLUCOSE BLDC GLUCOMTR-MCNC: 83 MG/DL (ref 70–99)
GLUCOSE BLDC GLUCOMTR-MCNC: 86 MG/DL (ref 70–99)
GLUCOSE SERPL-MCNC: 93 MG/DL (ref 70–99)
GLUCOSE UR STRIP-MCNC: NEGATIVE MG/DL
HGB UR QL STRIP: NEGATIVE
HOLD SPECIMEN: NORMAL
KETONES UR STRIP-MCNC: NEGATIVE MG/DL
LEUKOCYTE ESTERASE UR QL STRIP: ABNORMAL
MAGNESIUM SERPL-MCNC: 2.2 MG/DL (ref 1.7–2.3)
MUCOUS THREADS #/AREA URNS LPF: PRESENT /LPF
NITRATE UR QL: POSITIVE
PH UR STRIP: 5 [PH] (ref 5–7)
PHOSPHATE SERPL-MCNC: 4.3 MG/DL (ref 2.5–4.5)
POTASSIUM SERPL-SCNC: 4 MMOL/L (ref 3.4–5.3)
RBC URINE: 0 /HPF
SODIUM SERPL-SCNC: 139 MMOL/L (ref 136–145)
SP GR UR STRIP: 1.01 (ref 1–1.03)
SQUAMOUS EPITHELIAL: 1 /HPF
UROBILINOGEN UR STRIP-MCNC: NORMAL MG/DL
WBC URINE: 7 /HPF

## 2023-09-06 PROCEDURE — 97116 GAIT TRAINING THERAPY: CPT | Mod: GP

## 2023-09-06 PROCEDURE — 87186 SC STD MICRODIL/AGAR DIL: CPT | Performed by: NURSE PRACTITIONER

## 2023-09-06 PROCEDURE — 81001 URINALYSIS AUTO W/SCOPE: CPT | Performed by: NURSE PRACTITIONER

## 2023-09-06 PROCEDURE — 36415 COLL VENOUS BLD VENIPUNCTURE: CPT | Performed by: NURSE PRACTITIONER

## 2023-09-06 PROCEDURE — 97530 THERAPEUTIC ACTIVITIES: CPT | Mod: GP

## 2023-09-06 PROCEDURE — 71045 X-RAY EXAM CHEST 1 VIEW: CPT | Mod: 26 | Performed by: RADIOLOGY

## 2023-09-06 PROCEDURE — 83735 ASSAY OF MAGNESIUM: CPT | Performed by: NURSE PRACTITIONER

## 2023-09-06 PROCEDURE — 99239 HOSP IP/OBS DSCHRG MGMT >30: CPT | Performed by: NURSE PRACTITIONER

## 2023-09-06 PROCEDURE — 84100 ASSAY OF PHOSPHORUS: CPT | Performed by: NURSE PRACTITIONER

## 2023-09-06 PROCEDURE — 99232 SBSQ HOSP IP/OBS MODERATE 35: CPT | Performed by: PHYSICIAN ASSISTANT

## 2023-09-06 PROCEDURE — 80048 BASIC METABOLIC PNL TOTAL CA: CPT | Performed by: NURSE PRACTITIONER

## 2023-09-06 PROCEDURE — 250N000013 HC RX MED GY IP 250 OP 250 PS 637: Performed by: NURSE PRACTITIONER

## 2023-09-06 PROCEDURE — 71045 X-RAY EXAM CHEST 1 VIEW: CPT

## 2023-09-06 PROCEDURE — 250N000011 HC RX IP 250 OP 636: Performed by: NURSE PRACTITIONER

## 2023-09-06 RX ORDER — LIDOCAINE 4 G/G
1 PATCH TOPICAL EVERY 24 HOURS
Qty: 15 PATCH | Refills: 0 | Status: SHIPPED | OUTPATIENT
Start: 2023-09-06 | End: 2023-09-14

## 2023-09-06 RX ORDER — CEFDINIR 300 MG/1
300 CAPSULE ORAL EVERY 12 HOURS
Qty: 10 CAPSULE | Refills: 0 | Status: SHIPPED | OUTPATIENT
Start: 2023-09-06 | End: 2023-09-11

## 2023-09-06 RX ORDER — OXYCODONE HYDROCHLORIDE 5 MG/1
5 TABLET ORAL EVERY 6 HOURS PRN
Qty: 18 TABLET | Refills: 0 | Status: SHIPPED | OUTPATIENT
Start: 2023-09-06 | End: 2023-10-16

## 2023-09-06 RX ORDER — ACETAMINOPHEN 325 MG/1
975 TABLET ORAL EVERY 8 HOURS
Status: ON HOLD | COMMUNITY
Start: 2023-09-06 | End: 2024-06-18

## 2023-09-06 RX ORDER — CEFDINIR 300 MG/1
300 CAPSULE ORAL EVERY 12 HOURS SCHEDULED
Status: DISCONTINUED | OUTPATIENT
Start: 2023-09-06 | End: 2023-09-07 | Stop reason: HOSPADM

## 2023-09-06 RX ORDER — AMOXICILLIN 250 MG
1 CAPSULE ORAL 2 TIMES DAILY
Qty: 12 TABLET | Refills: 0 | Status: SHIPPED | OUTPATIENT
Start: 2023-09-06 | End: 2024-05-20

## 2023-09-06 RX ORDER — METHOCARBAMOL 750 MG/1
750 TABLET, FILM COATED ORAL EVERY 6 HOURS
Qty: 28 TABLET | Refills: 0 | Status: SHIPPED | OUTPATIENT
Start: 2023-09-06 | End: 2023-09-13

## 2023-09-06 RX ADMIN — CYANOCOBALAMIN TAB 1000 MCG 1000 MCG: 1000 TAB at 08:11

## 2023-09-06 RX ADMIN — ACETAMINOPHEN 975 MG: 325 TABLET, FILM COATED ORAL at 00:29

## 2023-09-06 RX ADMIN — OXYCODONE HYDROCHLORIDE 5 MG: 5 TABLET ORAL at 13:56

## 2023-09-06 RX ADMIN — ASPIRIN 81 MG: 81 TABLET ORAL at 08:11

## 2023-09-06 RX ADMIN — SENNOSIDES AND DOCUSATE SODIUM 2 TABLET: 50; 8.6 TABLET ORAL at 08:13

## 2023-09-06 RX ADMIN — DULOXETINE HYDROCHLORIDE 20 MG: 20 CAPSULE, DELAYED RELEASE ORAL at 08:12

## 2023-09-06 RX ADMIN — DICLOFENAC SODIUM 2 G: 10 GEL TOPICAL at 08:15

## 2023-09-06 RX ADMIN — CEFDINIR 300 MG: 300 CAPSULE ORAL at 12:29

## 2023-09-06 RX ADMIN — LIDOCAINE PATCH 4% 2 PATCH: 40 PATCH TOPICAL at 08:12

## 2023-09-06 RX ADMIN — HEPARIN SODIUM 5000 UNITS: 5000 INJECTION, SOLUTION INTRAVENOUS; SUBCUTANEOUS at 12:29

## 2023-09-06 RX ADMIN — METHOCARBAMOL 750 MG: 750 TABLET ORAL at 00:29

## 2023-09-06 RX ADMIN — AMLODIPINE BESYLATE 5 MG: 5 TABLET ORAL at 08:11

## 2023-09-06 RX ADMIN — DICLOFENAC SODIUM 2 G: 10 GEL TOPICAL at 12:29

## 2023-09-06 RX ADMIN — CARVEDILOL 12.5 MG: 12.5 TABLET, FILM COATED ORAL at 17:24

## 2023-09-06 RX ADMIN — DICLOFENAC SODIUM 2 G: 10 GEL TOPICAL at 17:24

## 2023-09-06 RX ADMIN — CARVEDILOL 12.5 MG: 12.5 TABLET, FILM COATED ORAL at 08:11

## 2023-09-06 RX ADMIN — DOXAZOSIN 1 MG: 1 TABLET ORAL at 09:32

## 2023-09-06 RX ADMIN — LEVETIRACETAM 500 MG: 250 TABLET, FILM COATED ORAL at 08:11

## 2023-09-06 RX ADMIN — Medication 125 MCG: at 08:10

## 2023-09-06 RX ADMIN — OXYCODONE HYDROCHLORIDE 5 MG: 5 TABLET ORAL at 04:32

## 2023-09-06 RX ADMIN — METHOCARBAMOL 750 MG: 750 TABLET ORAL at 12:29

## 2023-09-06 RX ADMIN — HEPARIN SODIUM 5000 UNITS: 5000 INJECTION, SOLUTION INTRAVENOUS; SUBCUTANEOUS at 06:10

## 2023-09-06 RX ADMIN — ACETAMINOPHEN 975 MG: 325 TABLET, FILM COATED ORAL at 08:11

## 2023-09-06 RX ADMIN — ACETAMINOPHEN 975 MG: 325 TABLET, FILM COATED ORAL at 17:23

## 2023-09-06 RX ADMIN — METHOCARBAMOL 750 MG: 750 TABLET ORAL at 17:24

## 2023-09-06 RX ADMIN — LEVOTHYROXINE SODIUM 75 MCG: 75 TABLET ORAL at 09:32

## 2023-09-06 RX ADMIN — METHOCARBAMOL 750 MG: 750 TABLET ORAL at 06:10

## 2023-09-06 ASSESSMENT — ACTIVITIES OF DAILY LIVING (ADL)
ADLS_ACUITY_SCORE: 56

## 2023-09-06 NOTE — DISCHARGE SUMMARY
[facility]    Discharge Summary  Trauma Surgery Service    Date of Admission:  9/4/2023  Date of Discharge:  9/6/2023  Attending Physician: Dr. Micah Rene  Discharging Provider: Dasha Riggs CNP  Date of Service (when I saw the patient): 09/06/23    Primary Provider: Bony Castaneda  Primary Care clinic: [address] [place] [id number]  Phone: [phone]  Fax number: [phone]     Discharge Diagnoses   Closed fracture of multiple ribs of right side, initial encounter  Acute cystitis without hematuria    Hospital Course   Isabell Matthews is a 63 year old female history of hypertension, hyperlipidemia, type 1 diabetes, chronic kidney disease, hyperglycemia, hypothyroidism, vascular dementia, seizures, CVA on dual antiplatelet therapy who presented to the ED for evaluation of right sided rib pain following a fall at home.  She was found to have minimally displaced right posterior lateral 7th to 9th rib fractures. She was admitted to the trauma service for management of her injury.    Acute traumatic rib fractures  In rib fracture management, pulmonary toilet and good pain control allowing for good pulmonary toilet is paramount.  Prior to discharge, her pain was controlled for Isabell Matthews with scheduled acetaminophen, robaxin, topical pain medications, and PRN oral analgesics.     In order to prevent common pulmonary complications found with rib fracture patients, Isabell Matthews will need to continue with aggressive pulmonary toileting that includes, incentive spirometry, coughing and deep breathing exercises.  We recommend these continue at a minimum of QID for one month after discharge.  Patient has been provided for handout with instructions regarding this.     Hx DM1 with episodes of hypoglycemia  Isabell had 2 episodes of hypoglycemia, during her ED stay and hospital day 1 with reduced insulin dose. She was seen and evaluated by the Endocrinology teams  with the following discharge recommendations:  -Recommend no more than 4 units Lantus each evening, prescribed at discharge  - Recommend Novolog (or other short acting insulin) 1 unit: 25 grams carbohydrate  - Recommend Novolog (or other short acting insulin) 1 unit: 100 over 150 before meals and 1 unit: 100 for blood glucose levels greater than 225 at bedtime.    - Recommend Continuous Glucose Monitoring (CGMS) with alarms to better detect low blood sugar and diabetes education. CGMS requires prior authorization, so highly encouraged follow up with her Primary Endocrinologist to discuss further.     Urinary Tract Infection  Isabell Matthews was found to have UTI on routine urine analysis during this admission.  UA with +nitrites, leukocytosis and white count. She was started on a 5 day course of Cefdinir.  A urine cultures previously resistant to Bactrim. No a good candidate for Flouroquinolones.    Therapy Recommendations:   Current status of physical therapies on discharge:   Home with assist and home therapy    Pending Results   These results will be followed up by PCP or will call if current antibiotic not covered by current antibiotic regimen  Unresulted Labs Ordered in the Past 30 Days of this Admission       Date and Time Order Name Status Description    9/6/2023 10:48 AM Urine Culture In process           Code Status   Full Code  SUBJECTIVE: Prior to discharge Isabell was able to ambulate in the room with assist of 1. She reported good pain control and able to tolerate a diet. Patient was agreeable to going home. The discharge plan was discussed with her daughter Felton via phone who verbalized and understanding of the plan and cares.    Physical Exam   Temp: 98  F (36.7  C) Temp src: Oral BP: (!) 151/71 Pulse: 86   Resp: 16 SpO2: 91 % O2 Device: None (Room air)    Vitals:    09/04/23 1106   Weight: 59.7 kg (131 lb 9.8 oz)     Vital Signs with Ranges  Temp:  [97.6  F (36.4  C)-98.1  F (36.7  C)] 98  F (36.7   C)  Pulse:  [78-87] 86  Resp:  [16-18] 16  BP: (120-162)/(61-72) 151/71  SpO2:  [91 %-96 %] 91 %  I/O last 3 completed shifts:  In: 1040 [P.O.:1040]  Out: 901 [Urine:901]    Constitutional: Awake, alert, cooperative, no apparent distress  ENT: Normocephalic  Respiratory: No increased work of breathing, good air exchange, clear to auscultation bilaterally, no crackles or wheezing.  Cardiovascular: Normal apical impulse, regular rate and rhythm, normal S1 and S2  GI: Soft and non tender  Skin: Warm and dry  Musculoskeletal: There is no redness, warmth, or swelling of the joints.  Full range of motion noted.  Good strength with slight weakness RLE.   Neurologic: Awake, alert, oriented to name, place, forgetful  Neuropsychiatric: Calm, normal eye contact, alert    Discharge Disposition   Discharged to home  Condition at discharge: Stable  Discharge VS: Blood pressure (!) 151/71, pulse 86, temperature 98  F (36.7  C), temperature source Oral, resp. rate 16, weight 59.7 kg (131 lb 9.8 oz), SpO2 91 %, not currently breastfeeding.    Consultations This Hospital Stay   REGIONAL ANESTHESIA PAIN SERVICE ADULT IP CONSULT  PHYSICAL THERAPY ADULT IP CONSULT  NEUROLOGY GENERAL ADULT IP CONSULT  SPEECH LANGUAGE PATH ADULT IP CONSULT  REGIONAL ANESTHESIA PAIN SERVICE ADULT IP CONSULT  ENDOCRINE DIABETES ADULT IP CONSULT  CARE MANAGEMENT / SOCIAL WORK IP CONSULT  PHARMACY IP CONSULT  OCCUPATIONAL THERAPY ADULT IP CONSULT    Discharge Orders      Physical Therapy Referral      Occupational Therapy Referral      Reason for your hospital stay    Fall   Acute traumatic right 7-9th rib fractures     Activity    Your activity upon discharge: activity as tolerated and no heavy lifting for 4 weeks     Adult [facility] Follow-up and recommended labs and tests    Follow up with your primary care provider for continued medical care and hospital follow up in 7-10 days.    You have been involved in a recent trauma incident resulting in an injury.   "Studies show us that people affected by trauma have higher levels of post-traumatic stress disorder (PTSD) and/or depressive symptoms during the year following an injury.     Please consider the following.  Have you:  Had migraines about the event(s) or thought about the event(s) when you didn't want to?  Tried hard not to think about the event(s) or went out of your way to avoid situations that reminded you of the event(s)?  Been constantly on guard, watchful, or easily startled?  Felt numb or detached from people, activities, or your surroundings?   Felt guilty or unable to stop blaming yourself or others for the event(s) or any problems the event (s) may have caused?    If you answered \"yes\" to 3 or more of these questions, or if you simply want to discuss any of your feelings further, we recommend that you talk with your Primary Care Provider or a mental health professional.        Appointments on [facility] and/or [facility] (with [facility] or [facility] provider or service). Call [phone] if you haven't heard regarding these appointments within 7 days of discharge.     Incentive Spirometry    Continue to use incentive spirometer 6 times a day Until deep breathing is completely pain free without the need to splint     Diet    Follow this diet upon discharge:      Calorie Counts      Pureed Diet (level 4) Thin Liquids (level 0)     Discharge Medications   Current Discharge Medication List        START taking these medications    Details   cefdinir (OMNICEF) 300 MG capsule Take 1 capsule (300 mg) by mouth every 12 hours for 5 days  Qty: 10 capsule, Refills: 0    Associated Diagnoses: Acute cystitis without hematuria      Lidocaine (LIDOCARE) 4 % Patch Place 1 patch onto the skin every 24 hours To prevent lidocaine toxicity, patient should be patch free for 12 hrs daily.  Qty: 15 patch, Refills: 0    Associated Diagnoses: Closed fracture of multiple ribs of right side, initial encounter      methocarbamol (ROBAXIN) " 750 MG tablet Take 1 tablet (750 mg) by mouth every 6 hours for 7 days  Qty: 28 tablet, Refills: 0    Associated Diagnoses: Closed fracture of multiple ribs of right side, initial encounter      oxyCODONE (ROXICODONE) 5 MG tablet Take 1 tablet (5 mg) by mouth every 6 hours as needed for moderate to severe pain  Qty: 18 tablet, Refills: 0    Associated Diagnoses: Closed fracture of multiple ribs of right side, initial encounter      senna-docusate (SENOKOT-S/PERICOLACE) 8.6-50 MG tablet Take 1 tablet by mouth 2 times daily  Qty: 12 tablet, Refills: 0    Associated Diagnoses: Closed fracture of multiple ribs of right side, initial encounter           CONTINUE these medications which have CHANGED    Details   acetaminophen (TYLENOL) 325 MG tablet Take 3 tablets (975 mg) by mouth every 8 hours    Associated Diagnoses: Closed fracture of multiple ribs of right side, initial encounter      !! insulin aspart (NOVOLOG PEN) 100 UNIT/ML pen DOSE:  1 units per 25 grams of carbohydrate.  Only chart total amount of units given.  Do not give if pre-prandial glucose is less than 60 mg/dL. If given at mealtime, administer within 30 minutes of start of meal.  Qty: 15 mL, Refills: 0    Associated Diagnoses: Type 1 diabetes mellitus with other circulatory complication (H)      !! insulin aspart (NOVOLOG PEN) 100 UNIT/ML pen LOW INSULIN RESISTANCE DOSING  Do Not give Bedtime Correction Insulin if BG less than 225. For - 299 give 1 unit. For  - 399 give 2 units For BG greater than or equal 400 give 3 units.  Qty: 15 mL, Refills: 0    Associated Diagnoses: Type 1 diabetes mellitus with other circulatory complication (H)      insulin glargine (LANTUS PEN) 100 UNIT/ML pen Inject 4 Units Subcutaneous every 24 hours  Qty: 15 mL, Refills: 0    Comments: If Lantus is not covered by insurance, may substitute Basaglar or Semglee or other insulin glargine product per insurance preference at same dose and frequency.    Associated  Diagnoses: Type 1 diabetes mellitus with other circulatory complication (H)       !! - Potential duplicate medications found. Please discuss with provider.        CONTINUE these medications which have NOT CHANGED    Details   amLODIPine (NORVASC) 5 MG tablet Take 1 tablet (5 mg) by mouth daily  Qty: 30 tablet, Refills: 0    Associated Diagnoses: Essential hypertension      aspirin (ASA) 81 MG chewable tablet Take 1 tablet (81 mg) by mouth daily  Qty: 90 tablet, Refills: 3    Associated Diagnoses: Acute CVA (cerebrovascular accident) (H)      atorvastatin (LIPITOR) 40 MG tablet Take 1 tablet (40 mg) by mouth daily  Qty: 90 tablet, Refills: 3    Associated Diagnoses: Hyperlipidemia LDL goal <70      blood glucose monitoring (NO BRAND SPECIFIED) meter device kit Use to test blood sugar 4 times daily.  Please provide glucose meter that is covered by insurance.  Qty: 1 kit, Refills: 0    Associated Diagnoses: Type 1 diabetes mellitus with other circulatory complication (H); Type 1 diabetes mellitus with diabetic polyneuropathy (H)      carvedilol (COREG) 12.5 MG tablet Take 1 tablet (12.5 mg) by mouth 2 times daily (with meals)  Qty: 180 tablet, Refills: 3    Associated Diagnoses: Essential hypertension      cyanocobalamin (VITAMIN B-12) 1000 MCG tablet Take 1 tablet (1,000 mcg) by mouth daily  Qty: 100 tablet, Refills: 3    Associated Diagnoses: Vitamin B12 deficiency (non anemic)      diclofenac (VOLTAREN) 1 % topical gel Apply 2 g topically 4 times daily as needed for moderate pain  Qty: 150 g, Refills: 1    Associated Diagnoses: Pain in joint, ankle and foot, right      doxazosin (CARDURA) 1 MG tablet Take 1 tablet (1 mg) by mouth At Bedtime  Qty: 90 tablet, Refills: 3    Associated Diagnoses: Left-sided nontraumatic intracerebral hemorrhage, unspecified cerebral location (H); Essential hypertension      DULoxetine (CYMBALTA) 20 MG capsule Take 1 capsule (20 mg) by mouth daily  Qty: 90 capsule, Refills: 3     Associated Diagnoses: Fibromyalgia      gabapentin (NEURONTIN) 100 MG capsule Take 1 capsule (100 mg) by mouth At Bedtime  Qty: 90 capsule, Refills: 1    Associated Diagnoses: Type 1 diabetes mellitus with other circulatory complication (H)      insulin pen needle (31G X 8 MM) 31G X 8 MM miscellaneous Use 4 pen needles daily or as directed.  Qty: 300 each, Refills: 4    Associated Diagnoses: History of insulin dependent diabetes mellitus      levETIRAcetam (KEPPRA) 500 MG tablet Take 1 tablet (500 mg) by mouth 2 times daily  Qty: 180 tablet, Refills: 3    Associated Diagnoses: Seizures (H)      loratadine (CLARITIN) 10 mg tablet [LORATADINE (CLARITIN) 10 MG TABLET] Take 10 mg by mouth daily.      melatonin 10 mg Tab Take 10 mg by mouth nightly as needed      METHYLCELLULOSE, LAXATIVE, PO Take 1 Tablespoonful by mouth daily as needed      nystatin (MYCOSTATIN) 809794 UNIT/GM external cream Apply topically 2 times daily Until rash clear  Qty: 30 g, Refills: 1    Associated Diagnoses: Dermatitis      ondansetron (ZOFRAN ODT) 4 MG ODT tab Take 1 tablet (4 mg) by mouth every 6 hours as needed for nausea or vomiting  Qty: 20 tablet, Refills: 0    Associated Diagnoses: Nausea and vomiting, unspecified vomiting type      prochlorperazine (COMPAZINE) 10 MG tablet Take 1 tablet (10 mg) by mouth every 6 hours as needed for nausea or vomiting  Qty: 30 tablet, Refills: 0    Associated Diagnoses: Nausea      SYNTHROID 75 MCG tablet Take 1 tablet (75 mcg) by mouth daily  Qty: 90 tablet, Refills: 3    Comments: Discontinue previous dose  Associated Diagnoses: Acquired hypothyroidism      TRUE METRIX BLOOD GLUCOSE TEST test strip USE TO TEST BLOOD SUGAR 4 TO 5 TIMES DAILY OR AS DIRECTED  Qty: 400 strip, Refills: 3    Associated Diagnoses: Type 1 diabetes mellitus with other circulatory complication (H)      Vitamin D3 (CHOLECALCIFEROL) 125 MCG (5000 UT) tablet Take 125 mcg by mouth daily      zinc oxide (DESITIN) 20 % external  ointment Apply topically as needed for dry skin or irritation  Qty: 85 g, Refills: 3    Associated Diagnoses: Fecal smearing           STOP taking these medications       clopidogrel (PLAVIX) 75 MG tablet Comments:   Reason for Stopping:             Allergies   Allergies   Allergen Reactions    Seasonal Allergies      Data   Most Recent 3 CBC's:  Recent Labs   Lab Test 09/04/23 0228 06/18/23 0745 06/17/23  0855   WBC 8.7 4.4 5.0   HGB 11.0* 10.3* 10.6*   MCV 93 93 93    100* 151      Most Recent 3 BMP's:  Recent Labs   Lab Test 09/06/23  1220 09/06/23  0808 09/06/23  0641 09/06/23  0609 09/05/23  0815 09/05/23  0645 09/04/23  0407 09/04/23 0228   NA  --   --   --  139  --  142  --  141   POTASSIUM  --   --   --  4.0  --  4.0  --  4.3   CHLORIDE  --   --   --  106  --  109*  --  107   CO2  --   --   --  23  --  23  --  23   BUN  --   --   --  37.3*  --  40.4*  --  44.1*   CR  --   --   --  1.68*  --  1.56*  --  1.64*   ANIONGAP  --   --   --  10  --  10  --  11   VERONICA  --   --   --  8.9  --  8.9  --  10.0   * 86 83 93   < > 51*   < > 48*    < > = values in this interval not displayed.     Most Recent 2 LFT's:  Recent Labs   Lab Test 06/18/23 0745 06/17/23  0655   AST 29 33   ALT 24 23   ALKPHOS 111* 116*   BILITOTAL 0.3 0.2     Most Recent INR's and Anticoagulation Dosing History:  Anticoagulation Dose History  More data may exist         Latest Ref Rng & Units 9/27/2020 6/21/2021 4/5/2022 4/10/2022 5/3/2022 12/10/2022 9/4/2023   Recent Dosing and Labs   INR 0.85 - 1.15 0.99  0.91  1.02  0.93  1.1  1.3  1.00  1.1  0.98    ISTAT INR 0.86 - 1.14 - 1.0  - - - - -     Most Recent 3 Troponin's:  Recent Labs   Lab Test 06/21/21  1618   TROPI <0.015     Most Recent 6 Bacteria Isolates From Any Culture (See EPIC Reports for Culture Details):  Recent Labs   Lab Test 06/23/21  2145 09/27/20  2236 09/27/20  1906 09/27/20  1648 09/27/20  1617   CULT >100,000 colonies/mL  mixed urogenital isaias  Susceptibility  testing not routinely done    Multiple morphotypes present with no predominant organism.  Growth consistent with   probable contamination during collection.  Suggest repeat specimen if clinically   indicated.   No growth No growth No growth No growth     Most Recent TSH, T4 and A1c Labs:  Recent Labs   Lab Test 09/05/23  0645 09/04/23  0228 03/29/23  1158 12/12/22  0809   TSH 2.86  --    < >  --    T4  --   --   --  0.86*   A1C  --  9.3*   < >  --     < > = values in this interval not displayed.     Results for orders placed or performed during the hospital encounter of 09/04/23   Ribs XR, unilat 3 views + PA chest, right    Narrative    EXAM: XR RIBS and CHEST RT 3VW  LOCATION: [facility]  DATE: 9/4/2023    INDICATION: R lateral rib pain s p fall  COMPARISON: None.      Impression    IMPRESSION: The heart is at the upper limits of normal. Bibasilar subsegmental atelectasis is present. No pneumothorax is identified. Mildly displaced fractures of the right seventh through ninth ribs anterolaterally.    CT Head w/o Contrast    Narrative    EXAM: CT HEAD W/O CONTRAST  9/4/2023 10:59 AM     HISTORY: trauma BULMARO       COMPARISON: Head CT 6/11/2023    TECHNIQUE: Using multidetector thin collimation helical acquisition  technique, axial, coronal and sagittal CT images from the skull base  to the vertex were obtained without intravenous contrast.   (topogram) image(s) also obtained and reviewed.    FINDINGS:  No acute intracranial hemorrhage, mass effect, or midline shift.  Stable chronic bilateral basal ganglia and bilateral thalamic lacunar  type infarctions. No acute loss of gray-white matter differentiation.  Ventricles are proportionate to the cerebral sulci. Clear basal  cisterns. Generalized cerebral volume loss.1    Atraumatic calvarium. Clear paranasal sinuses, mastoid air cells.  Nonfocal orbits.       Impression    IMPRESSION: No acute intracranial pathology.  Chronic infarcts within  the bilateral basal ganglia and thalami. Stable generalized cerebral  volume loss.    I have personally reviewed the examination and initial interpretation  and I agree with the findings.    NAWAF CRAWFORD MD         SYSTEM ID:  [id number]   CT Chest/Abdomen/Pelvis w Contrast    Narrative    EXAMINATION: CT CHEST/ABDOMEN/PELVIS W CONTRAST  9/4/2023 11:01 AM      CLINICAL HISTORY: trauma BULMARO    COMPARISON: CT abdomen/pelvis 6/15/2023. CT chest/abdomen/pelvis  4/10/2022.    PROCEDURE COMMENTS: CT of the chest, abdomen, and pelvis was performed  iopamidol (ISOVUE-370) solution 80 mL intravenous contrast. Axial MIP   images of the chest, and coronal and sagittal reformatted images of  the chest, abdomen, and pelvis obtained.    FINDINGS:    Support devices: None.    Chest: The visualized thyroid gland is unremarkable. No suspicious  mass of the neck. No supraclavicular lymphadenopathy. The central  tracheobronchial tree is patent without intraluminal mass. Mild  bronchial wall thickening and bronchiectasis within the lower lobes  bilaterally. Dependent and bibasilar atelectasis with adjacent  groundglass opacities at the lung bases. Scattered  emphysematous/cystic changes within the bilateral lungs. No suspicious  or enlarging pulmonary nodules. No pneumothorax. Small right pleural  effusion. Trace left pleural effusion. The heart size is within normal  limits. Small pericardial effusion. Moderate multivessel coronary  artery atherosclerotic calcifications. Main pulmonary artery is  patent. Normal diameter of the mid ascending thoracic aorta and  pulmonary artery. Normal three-vessel and morphology of the aortic  arch. Mild atherosclerotic calcifications of the thoracic aorta. The  visualized esophagus is unremarkable. No enlarged mediastinal hilar or  axillary lymph nodes.    Abdomen/pelvis:  Liver: No mass. No intrahepatic biliary ductal dilation.    Biliary System: Normal gallbladder. No  extrahepatic biliary ductal  dilation.    Pancreas: No mass or pancreatic ductal dilation.    Adrenal glands: No mass or nodules    Spleen: Normal.    Kidneys: Stable appearance of bilateral renal cysts. There is a 3.0 x  2.0 cm hypoattenuating region within the central aspect of the upper  pole of the left kidney (series 3, image 54), stable compared to  6/16/2023 exam.     Gastrointestinal tract: Normal appendix. Normal caliber small bowel.   Colonic diverticulosis without evidence of acute diverticulitis. No  pneumatosis or focal bowel wall thickening. Moderate-to-large colonic  stool burden.    Mesentery/peritoneum/retroperitoneum: No mass. No free fluid or air.    Lymph nodes: No significant lymphadenopathy.    Vasculature: Patent major abdominal vasculature.    Pelvis: Distended bladder with air present.  Redemonstration of ovoid  structure within the right hemipelvis abutting the uterus (series 3,  image 95, measuring approximately 5.8 x 3.8 cm, likely represents an  exophytic uterine fibroid.    Osseous structures: Moderately displaced fractures of the right  posterior eighth through 10th ribs.  Bilateral hip arthroplasties.  Multilevel thoracic and lumbar spondylosis, most prevalent within the  mid and upper thoracic spine.      Soft tissues: Within normal limits.      Impression    IMPRESSION:  1. Right posterior mildly displaced acute rib fractures. Atelectasis  and small pleural effusion in the adjacent lung. No pneumothorax.  2. No acute pathology identified within the abdomen or pelvis.  3. Colonic diverticulosis without evidence of diverticulitis.  4. Stable hypointense lesion within the left upper kidney, decreased  in size compared to 4/10/2022 exam, likely complex cyst as described  on MRI from 5/10/2022.  5. Air within the bladder lumen, can be seen in the setting of urinary  infection or instrumentation (i.e. Parker) if previously present.     I have personally reviewed the examination and initial  interpretation  and I agree with the findings.    ALFONSO CARDENAS, DO         SYSTEM ID:  [id number]   XR Chest Port 1 View    Narrative    EXAM: XR CHEST PORT 1 VIEW  9/5/2023 7:00 AM     HISTORY:  Trauma Patient with Rib Fracture(s)       COMPARISON:  CT 9/4/2023, radiograph 9/4/2023    FINDINGS:   AP portable supine radiograph of the chest. Trachea is midline. Low  lung volumes. Cardiac silhouette is borderline enlarged. Perihilar and  basilar predominant streaky pulmonary opacities. No appreciable  pneumothorax. Trace right pleural effusion. No appreciable left  pleural effusion.    Unchanged alignment of multilevel posterior right-sided rib fractures.  Visualized upper abdomen is unremarkable.        Impression    IMPRESSION:   1. Stable alignment of multilevel right-sided rib fractures. No  appreciable pneumothorax.  2. Low lung volumes with streaky bibasilar pulmonary opacities,  favoring atelectasis.  3. Trace right pleural effusion.    I have personally reviewed the examination and initial interpretation  and I agree with the findings.    MARIA DE JESUS ARIAS DO         SYSTEM ID:  [id number]   XR Chest Port 1 View    Narrative    EXAM: XR CHEST PORT 1 VIEW  9/6/2023 5:58 AM     HISTORY:  Trauma Patient with Rib Fracture(s)       COMPARISON:  CT 9/4/2023, radiograph 9/5/2023    FINDINGS:   AP portable supine radiograph of the chest. Trachea is midline. Low  lung volumes. Cardiac silhouette is borderline enlarged. Perihilar and  basilar predominant streaky pulmonary opacities. No appreciable  pneumothorax. Trace right pleural effusion. No appreciable left  pleural effusion.    Unchanged alignment of multilevel posterior right-sided rib fractures.  Visualized upper abdomen is unremarkable.        Impression    IMPRESSION:   1. Stable exam with mild bibasilar pulmonary opacities likely  representing atelectasis  2. Unchanged multilevel right-sided rib fractures. No pneumothorax.    I have personally reviewed the  examination and initial interpretation  and I agree with the findings.    NATY CASTAÑEDA MD         SYSTEM ID:  [id number]       Time Spent on this Encounter   I, PEGGY Belcher CNP, personally saw the patient today and spent greater than 30 minutes discharging this patient.    We appreciate the opportunity to care for your patient while in the hospital.  Should you have any questions about their injuries or this discharge summary our contact information is below.    Trauma Services  [facility]   Department of Critical Care and Acute Care Surgery  [address] [place] [id number]  Office: [phone]

## 2023-09-06 NOTE — PLAN OF CARE
Goal Outcome Evaluation:    Plan of Care Reviewed With: patient  More alert and awake today.   AVSS. Sating mid 90s at RA. Using IS to 500-750mL with encouragement.   Rates pain at 8 during activity.   Oxycodone 5mg at 1400. Lido patches on to left upper chest.   BG this am of 84 and 268 before lunch.   On sliding scale & carbs coverage.  Calorie counts on going.   Ate  of both meals.   No c/o nausea.   OOB to chair with PT and was able to stay up about 2hrs.   Using walker during transfers and walked x2 - short distances.   Plan is to discharge to home today with daughter between 2-4pm.

## 2023-09-06 NOTE — PROGRESS NOTES
Care Management Discharge Note    Discharge Date: 09/06/2023       Discharge Disposition: Home with Home Care    Discharge Services: None    Discharge DME: None    Discharge Transportation: family or friend will provide    Private pay costs discussed: Not applicable    Does the patient's insurance plan have a 3 day qualifying hospital stay waiver?  No    PAS Confirmation Code: NA   Patient/family educated on Medicare website which has current facility and service quality ratings:      Education Provided on the Discharge Plan: Yes  Persons Notified of Discharge Plans: patient, daughter Felton  Patient/Family in Agreement with the Plan: yes    Handoff Referral Completed: Yes    Additional Information:  Updated by trauma NP, Dasha Riggs, that PT is now rec home with home care.  She has talked with pt's daughter, Felton, and she reports that they can provide the level of assist their Mom needs.  Patient and family prefer for pt to return home.  Home Care recommended.  Referral made to [facility] ([facility]), 13 agencies have declined.  There are 2 pending referrals.  Updated Dasha Riggs and she will order OP PT in case no agency is able to accept.  Family plans on providing transportation to home this afternoon.  [facility] will remain available if further needs arise.      Addendum 1704: Updated by [employer] [facility]) that [facility] has accepted the pt for home PT/OT services.  Orders placed.      [facility](PT/OT)  Phone: [phone]  Fax: [phone]      [facility]  Phone: [phone]  Pager: [phone]    SEARCHABLE in [facility] - search CARE COORDINATOR     [place] ([id number]) Saturday & Sunday; ([phone]) [facility] Recognized Holidays     Units: 5A, 5B & 5C  Pager: [phone]    Units: 6B, 6C & 6D    Pager: [phone]    Units: 7A, 7B, 7C & 7D    Pager: [phone]    Units: 6A & ICU   Pager: [phone]    Units: 5 Ortho, 5MS & WB ED Pager: [phone]    Units: 6MS, 8A & 10 ICU  Pager  [phone]

## 2023-09-06 NOTE — DISCHARGE INSTRUCTIONS
Recommend Novolog (or other short acting insulin) 1 unit: 25 grams carbohydrate   Recommend Novolog (or other short acting insulin) 1 unit: 100 grams of carbohydrates for glucose  level greater than 150  before meals and 1 unit: 100grams fo carbohydrates for blood glucose level greater than  225 at bedtime.    Please follow up with your Primary Care provider to evaluate for a continuous glucose monitor

## 2023-09-06 NOTE — PLAN OF CARE
"Goal Outcome Evaluation:    Problem: Pain Acute  Goal: Optimal Pain Control and Function  Outcome: Progressing     Temp: 98.1  F (36.7  C) Temp src: Oral BP: (!) 144/69 Pulse: 79   Resp: 18 SpO2: 94 % O2 Device: None (Room air)         Time of care: 1900-0730  Reason for admission: rib fx    NEURO: alert and confused at baseline. Follows commands, needs prompting to swallow. Given meds whole in applesauce  RESPIRATORY: occasionally shallow breathing d/t pain. Refuses IS at times. Sats 94% on RA.   CARDIAC: vss, htn within parameters. Denies cardiac chest pain.   GI/: voiding via purewick. Incontinent.   DIET: pureed, good intake. Pt's daughter says pt eats a soft diet at home.   PAIN/NAUSEA: pain \"bad' improved to minimal/moderate w/ use of scheduled pain meds, prn oxycodone, and ice packs. Denies nausea.   INCISION/DRAINS/SKIN: no new deficits  IV ACCESS: PIV SL  ACTIVITY: not oob. A2 w/ incontinence cares and assist w/ feeds.   LAB: reviewed.  at HS and 83 this AM.   CHANGES: no acute major changes  PLAN: continue w/ POC. Discharge home w/ home care services.       "

## 2023-09-06 NOTE — CONSULTS
Discharge Pharmacy Test Claim    Dexcom G7 continuous glucose monitor and supplies require prior authorization through patient's [employer] Medicare advantage plan. Pharmacy liaison will work on the PA request.    Addendum 9/8: PA approved, $0 copay.    Test Claim Copay   dexcom G7 0.00     Karla Uday  [facility] Pharmacy Liaison  Ph: [phone] Pager: [phone]   Securely message with the Vocera Web Console (learn more here)

## 2023-09-06 NOTE — TELEPHONE ENCOUNTER
September 6, 2023  5:05 PM    I received an acute message via epic related to discharge from social service today and contacted my nurse Dorina Argueta RN to discuss with Nathan García social service my concern related to patient being discharged to the home setting due to history of multiple hospitalizations with poor posthospital follow-up.   I was made aware of her hospitalization when admitted September 4, 2023 to the trauma team after a fall in the home setting with multiple rib fractures.  I reviewed hospital chart including social service Nathan García related to discharge planning notations from September 5 at 2:24 PM confused disoriented with limited ability to care for herself in the home setting, caregivers experiencing high stress with notations of barely hanging on.  Previous home care has been through [facility] uncertain if they are available to assist with home cares postdischarge.  I then received a discharge summary from discharging provider as noted below.  I requested a phone call to relay my concerns regarding patient being discharged in the home setting recommending due to my long history of understanding the patient living situation with the new information related to fall and fractured ribs it would be highly advised to at least have transitional care but more optimally long-term care for this very frail patient.  I received a phone call at 5:05pm expressing my concerns to the following provider  Dasha Riggs APRN CNP   Nurse Practitioner  Trauma  I discussed on review of the chart most recent notes, my history as her primary care provider, multiple episodes of emergency room visits with no-shows with me Dr. Castaneda for post hospital care, I was greatly surprised recommendation for discharge to the home setting was made for today.  I also indicated unlikely for the patient to be seen by me next week in post hospital follow-up as I will be out of clinic for several weeks.  I will forward to [facility]  social service Shanelle Chen , [facility] Nurse Dorina Argueta RN and copy discharging provider related to the concerns.  I also have received notification that home care does not have access at this time.I reviewed chart after the phone call noting the following.    9/6 4:37pm Accepted by [facility] Angelica [facility] [phone]  9/6 4:09PM Patient has been declined by 13 agencies, waiting to hear back from 2 additional agencies. Please inform patient we may not be able to secure services. Angelica 9/6  11:40 Unfortunately [facility] cannot accommodate this patient at this time due to payor capacity. We will outsource the referral. Thanks, CAR [facility]  11:24 Hello, [facility] is currently reviewing and we will get back to you asap regarding accept/decline status. [facility] [phone]   Last edited by Angelica Ruiz on 09/06/23 at 4783     I encouraged provider Dasha Riggs APRN CNP to return to the floor to determine if the patient had actually been discharged and reconsider discharge planning to more appropriate setting transitional care or long-term care.  Bony Castaneda MD

## 2023-09-06 NOTE — PROGRESS NOTES
Diabetes Consult Daily  Progress Note          Assessment/Plan:     Isabell Matthews is a 63 year old female with history of epilepsy, T2 IDDM for >40 years and treated as a type 1, HLD, HTN, CAD post PCI,recurrent UTI, CKD, vascular vs Alzheimer's dementia, and previous stroke. On 9/4/2023 reportedly the patient fell in her bathroom and tipped the towel bar off the wall with no head strike of LOC but concern for rib fracture, and reportedly cause of fall due to unsteadiness.  Family reports no focal deficits and she is at baseline upon arrival.  Her glucose on admission was found to be 48. CXR revealed displaced fractures of right sided 7-9 ribs. She has been admitted to the trauma service   Isabell was last in the hospital on 6/18/2023 with DKA. She was discharged on Lantus 14 units, 1:20 CHO coverage and medium sliding scale. Also with DKA in 5/2022.    Assessment:   1.  Type 2 insulin dependent, sub optimally controlled, KakE1x=2.3   2. EVON on CKI, creat=1.68, considered ruled out.    3. Anemia  4. hypoglycemia   Plan:                 -Decrease Lantus 4 units daily at 22:00    - PLEASE BE SURE TO COUNT AND RECORD ALL CARBOHYDRATES.                -May need D10 starting at 10 ml-20 ml to support her insulin, to keep BG >100 in the morning.  Since we are treating her like a type 1 diabetic we cannot not give her insulin.                -Continue Novolog Low sliding scale ( 1/100) with meals (>140) and bedtime(>200)                -Novolog 1:25 CHO coverage with meals and snacks - consider decrease later today/ tomorrow per trends.                  -Novolog Low sliding scale                -BG monitoring TID AC, HS, 0200 and 5 am                -hypoglycemia protocol                -recommend diet with carb counting protocol                -diabetes education needs will be assessed closer to discharge                -on discharge, will recommend outpatient follow up with [facility]  service   Plan discussed with patient, bedside RN Melody, and primary team.         Discharge plan:     -Recommend no more than 4 units Lantus each evening at this time.  Fasting BG was 46 on 7 unit sand 83 on 5 units.   - Recommend Novolog (or other short acting insulin) 1 unit: 25 grams carbohydrate   - Recommend Novolog (or other short acting insulin) 1 unit: 100 >150 before meals and 1 unit: 100 >225 at bedtime.     - STRONGLY RECOMMEND Continuous Glucose Monitoring (CGMS) with alarms to better detect low blood sugar.  Recommend see Diabetes Education for support optimizing and successfully using of CGMS.  I placed pharm consult to verify out of pocket cost.  If Nehal in agreement, please dispense at discharge.  We could also request inpatient CDE meet to start CGSM, but not sure family in agreement at this point.   - Also advise consider use of pump with low glucose suspend threshold to help avoid severe hypoglycemia.           Interval History:     The last 24 hours progress and nursing notes reviewed.  Had 200 ml po intake of thin or pureed liquid, but no carb recorded though appears some coverage given (Novolog dose >than sliding scale.)   Glucose tight this morning at 83/93, after 2 units given for  at 2234 and presumably some carbohydrate as correction dose would have been just 1 unit.  Isabell tells me she  enjoyed her breakfast and ask me to call daughter for insulin dosing.  Her VM  was full, so I sent a My Chart message.  She is also asking for lunch.    She is feeling well, but has some pain over right flank/ low back.  Oral intake is less than usual.   Activity is less than usual.      Glycemic control is very tight. Advised per primary team that family and Isabell plan to go home today.  They are not particularly concerned about     I was able to reach her daughter Felton.   Daughters have been acring for Mom's type 1 diabetes for 20 years.  They were thinking to give 10 units at home, but agreeabel  "to reccommended 4 units Lantus.  They monitor a lot.  Generally Mom  states hungry when starts to go low.  She has had blood sugar down to 12 and also >1000.  Everything affects her blood sugar.  Weather, pain.  They have tried CGMS sensor in the past and have not found it very helpful.  Discussed that I am concerned about low blood sugar and think should be helpful in preventing dangerously low blood sugars.  Felton defers to [facility] in this regard.      They prefer not to see endocrinology but to follow with primary care provider Dr Bony Diaz at this time.      Recent Labs   Lab 09/06/23  0641 09/06/23  0609 09/05/23  2234 09/05/23  1832 09/05/23  1610 09/05/23  1458   GLC 83 93 264* 181* 328* 401*           Nutrition:     Orders Placed This Encounter      Pureed Diet (level 4) Thin Liquids (level 0)    Supplements: none        PTA Regimen:   Lantus 14 units, 1:20 CHO coverage and medium sliding scale  per EPIC, still unable to verify with family  brittle Diabetic\"  Can go from 170 to 40 in 1 hour,            Review of Systems:   See interval hx          Medications:   No steroid         Physical Exam:     Gen: Alert, in NAD.  Sitting in chair with feet up.  No IV.    HEENT:  hearing intact to conversational volume  Resp: Unlabored  Neuro: oriented x3, communicating clearly. States that she lives in  [place].    Psych: \"okay\"mood.  Affect congruent  BP (!) 144/69 (BP Location: Right arm)   Pulse 79   Temp 98.1  F (36.7  C) (Oral)   Resp 18   Wt 59.7 kg (131 lb 9.8 oz)   SpO2 94%   BMI 21.90 kg/m               Data:     Lab Results   Component Value Date    A1C 9.3 09/04/2023    A1C 10.8 05/20/2023    A1C 11.2 03/29/2023    A1C 10.3 10/31/2022    A1C 8.9 05/03/2022    A1C 7.8 06/21/2021    A1C 11.7 09/27/2020            Lab Results   Component Value Date    CREAT 1.7 (H) 06/21/2021         CBC RESULTS:   Recent Labs   Lab Test 09/04/23  0228   WBC 8.7   RBC 3.86   HGB 11.0*   HCT 36.0   MCV 93   MCH 28.5 "   MCHC 30.6*   RDW 14.5        Recent Labs   Lab Test 09/06/23  0641 09/06/23  0609 09/05/23  0815 09/05/23  0645   NA  --  139  --  142   POTASSIUM  --  4.0  --  4.0   CHLORIDE  --  106  --  109*   CO2  --  23  --  23   ANIONGAP  --  10  --  10   GLC 83 93   < > 51*   BUN  --  37.3*  --  40.4*   CR  --  1.68*  --  1.56*   VERONICA  --  8.9  --  8.9    < > = values in this interval not displayed.     Liver Function Studies -   Recent Labs   Lab Test 06/18/23  0745   PROTTOTAL 5.3*   ALBUMIN 3.1*   BILITOTAL 0.3   ALKPHOS 111*   AST 29   ALT 24     Lab Results   Component Value Date    INR 0.98 09/04/2023    INR 1.00 12/10/2022    INR 1.1 12/10/2022    INR 1.3 05/03/2022    INR 0.93 04/10/2022    INR 1.1 04/10/2022    INR 1.02 04/05/2022    INR 0.91 06/21/2021    INR 0.99 09/27/2020       No results found for: HEBMP, COMCBC    60 minutes spent on the date of the encounter doing chart review, history and exam, coordinating care/communication, documentation and further activities per the note.  >50% time spent on the floor with patient and care givers.    It is my privilege to be involved in the care of the above patient.     Roseanne Pérez PA-C, MPAS  Diabetes, Endocrinology, and Metabolism  [phone] pager  On [facility] (inpatient)  [phone] office (page if no answer)    To contact Endocrine Diabetes service:   From 8AM-4PM: page inpatient diabetes provider that is following the patient that day (see filed or incomplete progress notes/consult notes).  If uncertain of provider assignment: page job code [phone].  For questions or updates from 4PM-8AM: page the diabetes job code for on call fellow: [phone]    Please notify inpatient diabetes service if changes are planned that will impact glycemia, such as changes to steroids, enteral feeding, parenteral feeding, dextrose fluids or procedures requiring prolonged NPO status.

## 2023-09-07 ENCOUNTER — DOCUMENTATION ONLY (OUTPATIENT)
Facility: CLINIC | Age: 63
End: 2023-09-07
Payer: MEDICAID

## 2023-09-07 NOTE — PROGRESS NOTES
Primary Care Provider Communication and Discharge plan    Isabell Matthews is 63 yr old lady with a PMH of DM1, CVA, vascular dementia who was admitted to the trauma service following a fall at home. She was found to have rib fractures discharge hospital day 3. Prior to discharge her pain was well managed on a multinodal regimen and she remained medically stable. She was evaluated on Hospital day 2 by our therapists at the time recommendations were made for TCU. Her pain regimen was also adjusted (added scheduled muscle relaxant and topical pain medications) with subsequent good pain control. She was stable on room air, and able to participate in more therapies hospital day 3, thus her therapy recs were updated with recommendations for home discharge with PT/OT and home assist.   See both RN and therapy notes for further details.     The patient was intermittently confused and forgetful however able to make basic needs known. Given her medical stability and therapy recs I called her daughter Felton to discharge discharge planning. She was agreeable so as the patient to go home. The pain management plan, follow care plan and indications to return to the hospital were discussed. Her current agency will be able to continue home care services in addition to assistance form her sister Charli.    The following recs were also communicated to our care management team during interdisciplinary rounds.I received feedback from care management that they were not able to secure a home care agency that can provide home PT/OT (per notes 13 in total) given insurance plus her current home care agency did not have any PT/OT openings. An alternate plan was therefore made for outpatient PT/OT for which referrals made.     Received a request from [employer] questing a call to her primary care provider Dr. Medina and or her nurse (Dorina) which was made. Her PCP expressed concern about prior lack of follow up and falls at home. She was last  seen in the Clinic 5 months ago and did not follow up after her last hospital stay. Dr. Medina requested Lisa be discharged either to a TCU or LTC facility. However, based on the current pre discharge assessment (Lisa was ambulating safely to be able to go home with home assist) and plan as mentioned above and family's willingness to continue to provide care in addition to the home care hours she receives she was sent home.     Discharge recommendations were reviewed with her daughter prior to leaving which include: follow up care with her PCP and Endocrinologist. She verbalized an understanding of the plan.    Dasha Riggs CNP

## 2023-09-07 NOTE — CARE PLAN
Pt discharged from unit 7B on 09/06/2023 at 1905 to home. All discharge instructions were given to the patient, medication orders were sent to discharge PHARMACY, and all belongings remained with the patient.    -Ya Ley.

## 2023-09-07 NOTE — PLAN OF CARE
Physical Therapy Discharge Summary    Reason for therapy discharge:    Discharged to home with home therapy.    Progress towards therapy goal(s). See goals on Care Plan in [facility] electronic health record for goal details.  Goals partially met.  Barriers to achieving goals:   discharge from facility.    Therapy recommendation(s):    Continued therapy is recommended.  Rationale/Recommendations:  home safety, fall prevention.

## 2023-09-08 ENCOUNTER — PATIENT OUTREACH (OUTPATIENT)
Dept: CARE COORDINATION | Facility: CLINIC | Age: 63
End: 2023-09-08
Payer: MEDICAID

## 2023-09-08 LAB — BACTERIA UR CULT: ABNORMAL

## 2023-09-08 NOTE — PROGRESS NOTES
Coding Modified  What was modified:   CKD 3  Creatinine close to baseline prior to discharge with balanced electrolytes. She received IVF on admission while , able to tolerate a diet with adequate UOP prior to discharge.  Dasha Riggs CNP

## 2023-09-08 NOTE — PROGRESS NOTES
Connected Care Resource Center Contact  [facility]/Voicemail     Clinical Data: Post-Discharge Outreach     Outreach attempted x 2.  Left message on patient's voicemail, providing [facility]'s 24/7 scheduling and nurse triage phone number [phone] ([phone]) for questions/concerns and/or to schedule an appt with an [facility] provider, if they do not have a PCP.      Plan:  [facility] will do no further outreaches at this time.       Beatris Reyes MA  Connected Care Resource Center, [facility]    *Connected Care Resource Team does NOT follow patient ongoing. Referrals are identified based on internal discharge reports and the outreach is to ensure patient has an understanding of their discharge instructions.

## 2023-09-08 NOTE — TELEPHONE ENCOUNTER
Nicolás, Bailee Castaneda, Bony CASTANEDA MD  Cc: Janine Delgado, DELONTE Castaneda,  I am including the  for discharge planning on this patient, Janine Danny.    Jere Mehta,  Please see below for patient Isabell Matthews.    Thank you both,  Bailee Monzon [facility]  September 8, 2023  3:30 PM   Noted home care declined.  They also declined care coordination.   I reviewed with the admitting team prior to discharge, spoke specifically to Dasha Riggs APRN CNP to reconsider discharge planning to more appropriate setting transitional care or long-term care.   Bony Castaneda MD      They did not schedule with me as her Primary care provider for hospital follow-up scheduled with 9/14/2023 Dr Mohan.  I would recommend if they no show for 9/14/2023 visit a vulnerable adult report be filed due to non adherence for medical recommendations, lack of hospital follow-up  and to assess patient safety.  It appears the patient was discharged from the hospital on 9/7/2023.  I will be out of clinic after 9/11 not returning until 9/25/23, I will be traveling internationally and not available. ( Dr Will covering for me)  Bony Castaneda MD

## 2023-09-08 NOTE — PROGRESS NOTES
Prior Authorization Approval    Medication: DEXCOM G7 SENSOR MISC  PA Initiated: 9/7/2023  PA Type: Clinical    Insurance: [employer] ([facility]) - Phone [phone] Fax [phone]  Count includes the [facility] / Reference #: [id number] / [id number]   Authorization Effective Dates: 9/7/2023 - 12/31/2023    Expected CoPay: 0.00     CoPay Card Eligible: No      [facility] [place] [address]  Pharmacy Notified: No  Patient Notified: Yes      Karla Frye  [facility] Pharmacy Liaison  Ph: [phone] Pager: [phone]   Securely message with the Vocera Web Console (learn more here)

## 2023-09-11 ENCOUNTER — TELEPHONE (OUTPATIENT)
Dept: FAMILY MEDICINE | Facility: CLINIC | Age: 63
End: 2023-09-11
Payer: MEDICAID

## 2023-09-11 NOTE — TELEPHONE ENCOUNTER
[facility] Call Center    Phone Message    May a detailed message be left on voicemail: yes     Reason for Call: Home PT 1x per week for 5 weeks, 2x per week for 4 weeks, please call with verbal orders thank you    Action Taken: Message routed to:  [facility] ([facility]): pcc    Travel Screening: Not Applicable

## 2023-09-12 ENCOUNTER — PATIENT OUTREACH (OUTPATIENT)
Dept: CARE COORDINATION | Facility: CLINIC | Age: 63
End: 2023-09-12
Payer: MEDICAID

## 2023-09-12 NOTE — TELEPHONE ENCOUNTER
Bolivar called clinic back. Writer gave verbal orders for home PT 1x per week for 5 weeks, 2x per week for 4 weeks.  Andie AGUILAR LPN  [facility] Primary Care [facility]

## 2023-09-12 NOTE — TELEPHONE ENCOUNTER
RAINAM for Bolivar to call clinic back to give verbal orders. VM box did not state it was confidential/secure.  Andie AGUILAR LPN  [facility] Primary Care [facility]

## 2023-09-12 NOTE — PROGRESS NOTES
Social Work - Telephone/MyChart message  [facility]  Data:   Patient Name: Isabell Matthews  Goes By: Isabell    RUSS/Age: 1960 (63 year old)      Referral Source: Patient's daughter contacted clinic directly    Reason for Referral: Requesting to speak with     Patient's daughter:  Vishal Peña, [phone]    Intervention: Left voice message for patient's daughterVishal on 2023.   Plan:  will await return phone call/message and provide assistance at that time.      Shanelle Chen [facility]  Clinical , Outpatient Specialty Clinics  [facility] and Surgery [facility]  Direct Phone: [phone]

## 2023-09-14 ENCOUNTER — OFFICE VISIT (OUTPATIENT)
Dept: INTERNAL MEDICINE | Facility: CLINIC | Age: 63
End: 2023-09-14
Payer: COMMERCIAL

## 2023-09-14 VITALS — OXYGEN SATURATION: 99 % | HEART RATE: 82 BPM | DIASTOLIC BLOOD PRESSURE: 60 MMHG | SYSTOLIC BLOOD PRESSURE: 106 MMHG

## 2023-09-14 DIAGNOSIS — E53.8 VITAMIN B12 DEFICIENCY (NON ANEMIC): ICD-10-CM

## 2023-09-14 DIAGNOSIS — I63.9 ACUTE CVA (CEREBROVASCULAR ACCIDENT) (H): ICD-10-CM

## 2023-09-14 DIAGNOSIS — I10 ESSENTIAL HYPERTENSION: ICD-10-CM

## 2023-09-14 DIAGNOSIS — E78.5 HYPERLIPIDEMIA LDL GOAL <70: ICD-10-CM

## 2023-09-14 DIAGNOSIS — L30.9 DERMATITIS: ICD-10-CM

## 2023-09-14 DIAGNOSIS — I61.9 LEFT-SIDED NONTRAUMATIC INTRACEREBRAL HEMORRHAGE, UNSPECIFIED CEREBRAL LOCATION (H): ICD-10-CM

## 2023-09-14 DIAGNOSIS — E11.42 TYPE 2 DIABETES MELLITUS WITH DIABETIC POLYNEUROPATHY, WITH LONG-TERM CURRENT USE OF INSULIN (H): Primary | ICD-10-CM

## 2023-09-14 DIAGNOSIS — R15.1 FECAL SMEARING: ICD-10-CM

## 2023-09-14 DIAGNOSIS — M25.571 PAIN IN JOINT, ANKLE AND FOOT, RIGHT: ICD-10-CM

## 2023-09-14 DIAGNOSIS — M79.7 FIBROMYALGIA: ICD-10-CM

## 2023-09-14 DIAGNOSIS — R56.9 SEIZURES (H): ICD-10-CM

## 2023-09-14 DIAGNOSIS — Z86.39 HISTORY OF INSULIN DEPENDENT DIABETES MELLITUS: ICD-10-CM

## 2023-09-14 DIAGNOSIS — G47.09 OTHER INSOMNIA: ICD-10-CM

## 2023-09-14 DIAGNOSIS — S22.41XA CLOSED FRACTURE OF MULTIPLE RIBS OF RIGHT SIDE, INITIAL ENCOUNTER: ICD-10-CM

## 2023-09-14 DIAGNOSIS — Z79.4 TYPE 2 DIABETES MELLITUS WITH DIABETIC POLYNEUROPATHY, WITH LONG-TERM CURRENT USE OF INSULIN (H): Primary | ICD-10-CM

## 2023-09-14 DIAGNOSIS — E10.59 TYPE 1 DIABETES MELLITUS WITH OTHER CIRCULATORY COMPLICATION (H): ICD-10-CM

## 2023-09-14 DIAGNOSIS — E03.9 ACQUIRED HYPOTHYROIDISM: ICD-10-CM

## 2023-09-14 PROCEDURE — 99213 OFFICE O/P EST LOW 20 MIN: CPT | Mod: GE

## 2023-09-14 RX ORDER — GABAPENTIN 100 MG/1
100 CAPSULE ORAL AT BEDTIME
Qty: 90 CAPSULE | Refills: 1 | Status: SHIPPED | OUTPATIENT
Start: 2023-09-14 | End: 2024-01-29

## 2023-09-14 RX ORDER — ATORVASTATIN CALCIUM 40 MG/1
40 TABLET, FILM COATED ORAL DAILY
Qty: 90 TABLET | Refills: 3 | Status: SHIPPED | OUTPATIENT
Start: 2023-09-14 | End: 2024-05-20

## 2023-09-14 RX ORDER — LANOLIN ALCOHOL/MO/W.PET/CERES
1000 CREAM (GRAM) TOPICAL DAILY
Qty: 100 TABLET | Refills: 3 | Status: SHIPPED | OUTPATIENT
Start: 2023-09-14

## 2023-09-14 RX ORDER — CALCIUM CITRATE/VITAMIN D3 200MG-6.25
TABLET ORAL
Qty: 400 STRIP | Refills: 3 | Status: SHIPPED | OUTPATIENT
Start: 2023-09-14

## 2023-09-14 RX ORDER — ZINC OXIDE 20 %
OINTMENT (GRAM) TOPICAL PRN
Qty: 85 G | Refills: 3 | Status: SHIPPED | OUTPATIENT
Start: 2023-09-14

## 2023-09-14 RX ORDER — LIDOCAINE 4 G/G
1 PATCH TOPICAL EVERY 24 HOURS
Qty: 15 PATCH | Refills: 0 | Status: SHIPPED | OUTPATIENT
Start: 2023-09-14 | End: 2023-10-16

## 2023-09-14 RX ORDER — LEVETIRACETAM 500 MG/1
500 TABLET ORAL 2 TIMES DAILY
Qty: 180 TABLET | Refills: 3 | Status: SHIPPED | OUTPATIENT
Start: 2023-09-14 | End: 2024-05-20

## 2023-09-14 RX ORDER — PHENOL 1.4 %
10 AEROSOL, SPRAY (ML) MUCOUS MEMBRANE
Qty: 90 TABLET | Refills: 3 | Status: SHIPPED | OUTPATIENT
Start: 2023-09-14 | End: 2023-10-16

## 2023-09-14 RX ORDER — LORATADINE 10 MG/1
10 TABLET ORAL DAILY
Qty: 90 TABLET | Refills: 3 | Status: SHIPPED | OUTPATIENT
Start: 2023-09-14 | End: 2024-05-20

## 2023-09-14 RX ORDER — ASPIRIN 81 MG/1
81 TABLET, CHEWABLE ORAL DAILY
Qty: 90 TABLET | Refills: 3 | Status: SHIPPED | OUTPATIENT
Start: 2023-09-14

## 2023-09-14 RX ORDER — DULOXETIN HYDROCHLORIDE 20 MG/1
20 CAPSULE, DELAYED RELEASE ORAL DAILY
Qty: 90 CAPSULE | Refills: 3 | Status: SHIPPED | OUTPATIENT
Start: 2023-09-14 | End: 2024-05-20

## 2023-09-14 RX ORDER — AMLODIPINE BESYLATE 5 MG/1
5 TABLET ORAL DAILY
Qty: 30 TABLET | Refills: 0 | Status: SHIPPED | OUTPATIENT
Start: 2023-09-14 | End: 2023-10-05

## 2023-09-14 RX ORDER — DOXAZOSIN 1 MG/1
1 TABLET ORAL AT BEDTIME
Qty: 90 TABLET | Refills: 3 | Status: SHIPPED | OUTPATIENT
Start: 2023-09-14 | End: 2023-10-16 | Stop reason: DRUGHIGH

## 2023-09-14 RX ORDER — NYSTATIN 100000 U/G
CREAM TOPICAL 2 TIMES DAILY
Qty: 30 G | Refills: 1 | Status: SHIPPED | OUTPATIENT
Start: 2023-09-14 | End: 2023-10-16

## 2023-09-14 RX ORDER — CARVEDILOL 12.5 MG/1
12.5 TABLET ORAL 2 TIMES DAILY WITH MEALS
Qty: 180 TABLET | Refills: 3 | Status: SHIPPED | OUTPATIENT
Start: 2023-09-14 | End: 2023-10-16

## 2023-09-14 RX ORDER — LEVOTHYROXINE SODIUM 75 MCG
75 TABLET ORAL DAILY
Qty: 90 TABLET | Refills: 3 | Status: SHIPPED | OUTPATIENT
Start: 2023-09-14 | End: 2024-01-29

## 2023-09-14 NOTE — PROGRESS NOTES
[facility]     CC: Hospital discharge follow up for acute traumatic rib fractures      HPI: Isabell Matthews is a 63 year old female with PMHx of CAD s/p PCI, HTN, DLP, insulin-dependent type II DM, CKD, dementia, prior CVA, and recent hospitalization for multiple rib fractures following mechanical fall, presents today for hospital discharge follow up for acute traumatic rib fractures.    On 9/4/2023 reportedly the patient fell in her bathroom and tipped the towel bar off the wall with no head strike of LOC but concern for rib fracture, and reportedly cause of fall due to unsteadiness.  Family reports no focal deficits and she is at baseline upon arrival.  Her glucose on admission was found to be 48. CXR revealed displaced fractures of right sided 7-9 ribs. She was admitted to the trauma service until 9/6/2023. She was also found to have UTI from E.Coli and got treated with PO cefdinir.     She had 2 episodes of hypoglycemia, during her ED stay and hospital day 1 with reduced insulin dose. She was seen and evaluated by the Endocrinology teams with the following discharge recommendations:  -Recommend no more than 4 units Lantus each evening, prescribed at discharge  - Recommend Novolog (or other short acting insulin) 1 unit: 25 grams carbohydrate  - Recommend Novolog (or other short acting insulin) 1 unit: 100 over 150 before meals and 1 unit: 100 for blood glucose levels greater than 225 at bedtime.    - Recommend Continuous Glucose Monitoring (CGMS) with alarms to better detect low blood sugar and diabetes education. CGMS requires prior authorization, so highly encouraged follow up with her Primary Endocrinologist to discuss further.     Prior to discharge, her PCP was concerned about her safety in the current living environment, but the plan was to discharge with home care, which ultimately got declined.       Past Medical History:   Diagnosis Date    Chronic pain     Coronary atherosclerosis 6/14/2016     Essential hypertension     Ex-cigarette smoker     quit 2018 over 35 years    Ex-cigarette smoker     Hyperlipidemia     Hypothyroid     Seizures (H)     Stroke (H)     Vascular dementia (H)      Past Surgical History:   Procedure Laterality Date    athroplasty hip Bilateral     ,     OTHER SURGICAL HISTORY      athroplasty hip    PICC DOUBLE LUMEN PLACEMENT Right 2023    right basilic 5 fr dl power picc 39 cm    STENT       Family History   Problem Relation Age of Onset    Acute Myocardial Infarction Father     Heart Disease Maternal Grandmother     Dementia Maternal Grandmother     Myocardial Infarction Father     Dementia Paternal Grandmother     Heart Disease Paternal Grandmother      Social History     Tobacco Use    Smoking status: Former     Packs/day: 0.50     Years: 35.00     Pack years: 17.50     Types: Cigarettes     Quit date: 2018     Years since quittin.2    Smokeless tobacco: Never   Substance Use Topics    Alcohol use: Not Currently    Drug use: Not Currently     Current Outpatient Medications   Medication Sig Dispense Refill    acetaminophen (TYLENOL) 325 MG tablet Take 3 tablets (975 mg) by mouth every 8 hours      amLODIPine (NORVASC) 5 MG tablet Take 1 tablet (5 mg) by mouth daily 30 tablet 0    aspirin (ASA) 81 MG chewable tablet Take 1 tablet (81 mg) by mouth daily 90 tablet 3    atorvastatin (LIPITOR) 40 MG tablet Take 1 tablet (40 mg) by mouth daily 90 tablet 3    blood glucose monitoring (NO BRAND SPECIFIED) meter device kit Use to test blood sugar 4 times daily.  Please provide glucose meter that is covered by insurance. 1 kit 0    carvedilol (COREG) 12.5 MG tablet Take 1 tablet (12.5 mg) by mouth 2 times daily (with meals) 180 tablet 3    cyanocobalamin (VITAMIN B-12) 1000 MCG tablet Take 1 tablet (1,000 mcg) by mouth daily 100 tablet 3    diclofenac (VOLTAREN) 1 % topical gel Apply 2 g topically 4 times daily as needed for moderate pain 150 g 1    doxazosin  (CARDURA) 1 MG tablet Take 1 tablet (1 mg) by mouth At Bedtime 90 tablet 3    DULoxetine (CYMBALTA) 20 MG capsule Take 1 capsule (20 mg) by mouth daily 90 capsule 3    gabapentin (NEURONTIN) 100 MG capsule Take 1 capsule (100 mg) by mouth At Bedtime 90 capsule 1    insulin aspart (NOVOLOG PEN) 100 UNIT/ML pen DOSE:  1 units per 25 grams of carbohydrate.  Only chart total amount of units given.  Do not give if pre-prandial glucose is less than 60 mg/dL. If given at mealtime, administer within 30 minutes of start of meal. 15 mL 0    insulin aspart (NOVOLOG PEN) 100 UNIT/ML pen LOW INSULIN RESISTANCE DOSING  Do Not give Bedtime Correction Insulin if BG less than 225. For - 299 give 1 unit. For  - 399 give 2 units For BG greater than or equal 400 give 3 units. 15 mL 0    insulin glargine (LANTUS PEN) 100 UNIT/ML pen Inject 4 Units Subcutaneous every 24 hours 15 mL 0    insulin pen needle (31G X 8 MM) 31G X 8 MM miscellaneous Use 4 pen needles daily or as directed. 300 each 4    levETIRAcetam (KEPPRA) 500 MG tablet Take 1 tablet (500 mg) by mouth 2 times daily (Patient not taking: Reported on 6/16/2023) 180 tablet 3    Lidocaine (LIDOCARE) 4 % Patch Place 1 patch onto the skin every 24 hours To prevent lidocaine toxicity, patient should be patch free for 12 hrs daily. 15 patch 0    loratadine (CLARITIN) 10 mg tablet [LORATADINE (CLARITIN) 10 MG TABLET] Take 10 mg by mouth daily.      melatonin 10 mg Tab Take 10 mg by mouth nightly as needed      methocarbamol (ROBAXIN) 750 MG tablet Take 1 tablet (750 mg) by mouth every 6 hours for 7 days 28 tablet 0    METHYLCELLULOSE, LAXATIVE, PO Take 1 Tablespoonful by mouth daily as needed      nystatin (MYCOSTATIN) 467097 UNIT/GM external cream Apply topically 2 times daily Until rash clear 30 g 1    ondansetron (ZOFRAN ODT) 4 MG ODT tab Take 1 tablet (4 mg) by mouth every 6 hours as needed for nausea or vomiting 20 tablet 0    oxyCODONE (ROXICODONE) 5 MG tablet Take 1  tablet (5 mg) by mouth every 6 hours as needed for moderate to severe pain 18 tablet 0    prochlorperazine (COMPAZINE) 10 MG tablet Take 1 tablet (10 mg) by mouth every 6 hours as needed for nausea or vomiting 30 tablet 0    senna-docusate (SENOKOT-S/PERICOLACE) 8.6-50 MG tablet Take 1 tablet by mouth 2 times daily 12 tablet 0    SYNTHROID 75 MCG tablet Take 1 tablet (75 mcg) by mouth daily 90 tablet 3    TRUE METRIX BLOOD GLUCOSE TEST test strip USE TO TEST BLOOD SUGAR 4 TO 5 TIMES DAILY OR AS DIRECTED 400 strip 3    Vitamin D3 (CHOLECALCIFEROL) 125 MCG (5000 UT) tablet Take 125 mcg by mouth daily      zinc oxide (DESITIN) 20 % external ointment Apply topically as needed for dry skin or irritation 85 g 3        Allergies   Allergen Reactions    Seasonal Allergies          /60 (BP Location: Right arm, Patient Position: Sitting, Cuff Size: Adult Regular)   Pulse 82   SpO2 99%     Physical Examination:    General Appearance: AOx3, no clubbing/cyanosis, no edema, no JVD   HEENT: PERRL, EOMI, no pharyngeal erythema  Respiratory: CTAB, no wheezing, no crackles  Cardiovascular: RRR, normal S1/S2, no murmur  GI: no distension, normoactive bowel sounds, soft, not tender, no rebound tenderness or guarding, no hepatosplenomegaly  Genitourinary: no CVA tenderness  Skin: no rash  Musculoskeletal: no deformities, no joint swelling, no pitting edema bilaterally   Neurologic: CN grossly intact, no focal neurological deficits, no asterixis        Assessment and Plan:  Isabell Matthews is a 63 year old female with PMHx of CAD s/p PCI, HTN, DLP, insulin-dependent type II DM, CKD, dementia, prior CVA, and recent hospitalization for multiple rib fractures following mechanical fall, presents today for hospital discharge follow up for acute traumatic rib fractures.    # Acute traumatic rib fractures  Pain is somewhat controlled. Recommended local pain control.    # Hypoglycemia  # Insulin-dependent type II DM  She had 2 episodes of  hypoglycemia, during her ED stay and hospital day 1 with reduced insulin dose. She was seen and evaluated by the Endocrinology teams with the following discharge recommendations:  - Recommend no more than 4 units Lantus each evening, prescribed at discharge  - Recommend Novolog (or other short acting insulin) 1 unit: 25 grams carbohydrate  - Recommend Novolog (or other short acting insulin) 1 unit: 100 over 150 before meals and 1 unit: 100 for blood glucose levels greater than 225 at bedtime.    - Recommend Continuous Glucose Monitoring (CGMS) with alarms to better detect low blood sugar and diabetes education. CGMS requires prior authorization, so highly encouraged follow up with her Primary Endocrinologist to discuss further.  - Endocrine referral placed      This patient was discussed with Dr. Menard, who agrees with the above exam, assessment and plan    Wyatt Barber MD  Internal Medicine Resident (PGY-3)  [facility]

## 2023-09-14 NOTE — NURSING NOTE
Isabell Matthews is a 63 year old female patient that presents today in clinic for the following:    Chief Complaint   Patient presents with    Hospital F/U     Pt in ED 9/4/23     The patient's allergies and medications were reviewed as noted. A set of vitals were recorded as noted without incident: /60 (BP Location: Right arm, Patient Position: Sitting, Cuff Size: Adult Regular)   Pulse 82   SpO2 99% . The patient does not have any other questions for the provider.    Eddie Cruz, EMT 2:18 PM on 9/14/2023

## 2023-09-18 ENCOUNTER — PATIENT OUTREACH (OUTPATIENT)
Dept: CARE COORDINATION | Facility: CLINIC | Age: 63
End: 2023-09-18
Payer: MEDICAID

## 2023-09-18 NOTE — PROGRESS NOTES
Social Work - Follow-Up  [facility]    Data/Intervention:    Patient Name: Isabell Matthews Goes By: Isabell    /Age: 1960 (63 year old)    Reason for Follow-Up:  Patient's daughter, Vishal Peña, [phone], returned 's call. She left a message asking about getting a waiver assessment through [facility] because [facility] was booked far out.    Collaborated With:    -Vishal Peña, [phone]    Intervention/Education/Resources Provided:   called Vishal back. No Answer. Left a voicemail stating that since they live in [facility], they have to go through the [facility] assessment through \[facility] [place] [facility]\"". Because Patient is 63 years old she would qualify for an Elderly Waiver which has a much shorter waitlist for assessment.  also provided [facility] phone number on Maria Guadalupe's voicemail.     Assessment/Plan:  Maria Guadalupe to continue to assist Patient in the waiver assessment process.  will remain available as needed.    Previously provided patient/family with writer's contact information and availability.      Shanelle Chen [facility]  Clinical , Outpatient Specialty Clinics  [facility]  Direct Phone: [phone]

## 2023-09-19 ENCOUNTER — DOCUMENTATION ONLY (OUTPATIENT)
Dept: FAMILY MEDICINE | Facility: CLINIC | Age: 63
End: 2023-09-19
Payer: MEDICAID

## 2023-09-19 NOTE — PROGRESS NOTES
Type of Form Received:     Form Received (Date) 9/19/23   Form Filled out Yes, faxed 9/21 & 10/11   Placed in provider folder Yes

## 2023-09-21 ENCOUNTER — TELEPHONE (OUTPATIENT)
Dept: FAMILY MEDICINE | Facility: CLINIC | Age: 63
End: 2023-09-21
Payer: MEDICAID

## 2023-09-21 ENCOUNTER — DOCUMENTATION ONLY (OUTPATIENT)
Dept: FAMILY MEDICINE | Facility: CLINIC | Age: 63
End: 2023-09-21
Payer: MEDICAID

## 2023-09-21 DIAGNOSIS — Z53.9 DIAGNOSIS NOT YET DEFINED: Primary | ICD-10-CM

## 2023-09-21 PROCEDURE — G0179 MD RECERTIFICATION HHA PT: HCPCS | Performed by: FAMILY MEDICINE

## 2023-09-21 NOTE — TELEPHONE ENCOUNTER
[facility] Call Center    Phone Message    May a detailed message be left on voicemail: yes     Reason for Call: Order(s): Other:   Reason for requested: : evaluate and Treat with Effective date 9/22/23, please call with verbal orders thank you.  Date needed: asap  Provider name: PCP:   Bony Castaneda MD     Action Taken: Message routed to:  [facility] pcc    Travel Screening: Not Applicable

## 2023-09-21 NOTE — TELEPHONE ENCOUNTER
Called [facility] and left a secure VM. Gave verbal orders per Dr Castaneda for : evaluate and Treat with Effective date 9/22/23       Edwar Andrews CMA ([facility]) at 12:12 PM on 9/21/2023

## 2023-09-21 NOTE — PROGRESS NOTES
Type of Form Received:     Form Received (Date) 9/21/23   Form Filled out Yes 10/4/23   Placed in provider folder Yes

## 2023-09-25 ENCOUNTER — DOCUMENTATION ONLY (OUTPATIENT)
Dept: FAMILY MEDICINE | Facility: CLINIC | Age: 63
End: 2023-09-25
Payer: MEDICAID

## 2023-09-25 NOTE — PROGRESS NOTES
Type of Form Received:     Form Received (Date) 9/25/23   Form Filled out Yes 10/4/23   Placed in provider folder Yes

## 2023-10-04 ENCOUNTER — MEDICAL CORRESPONDENCE (OUTPATIENT)
Dept: HEALTH INFORMATION MANAGEMENT | Facility: CLINIC | Age: 63
End: 2023-10-04
Payer: MEDICAID

## 2023-10-05 DIAGNOSIS — I10 ESSENTIAL HYPERTENSION: ICD-10-CM

## 2023-10-05 RX ORDER — AMLODIPINE BESYLATE 5 MG/1
5 TABLET ORAL DAILY
Qty: 30 TABLET | Refills: 0 | Status: SHIPPED | OUTPATIENT
Start: 2023-10-05 | End: 2023-10-16

## 2023-10-05 NOTE — TELEPHONE ENCOUNTER
amLODIPine (NORVASC) 5 MG tablet         Sig: Take 1 tablet (5 mg) by mouth daily     Last Written Prescription Date:  9/14/23  Last Fill Quantity: 30,   # refills: 0  Last Office Visit : 9/14/23  Future Office visit:  10/16/23    Routing refill request to provider for review/approval because:  Cr abnormal. 30 days PENDED.  Creatinine   Date Value Ref Range Status   09/06/2023 1.68 (H) 0.51 - 0.95 mg/dL Final   06/27/2021 1.48 (H) 0.52 - 1.04 mg/dL Final

## 2023-10-14 ENCOUNTER — HEALTH MAINTENANCE LETTER (OUTPATIENT)
Age: 63
End: 2023-10-14

## 2023-10-16 ENCOUNTER — OFFICE VISIT (OUTPATIENT)
Dept: FAMILY MEDICINE | Facility: CLINIC | Age: 63
End: 2023-10-16
Payer: COMMERCIAL

## 2023-10-16 ENCOUNTER — DOCUMENTATION ONLY (OUTPATIENT)
Dept: FAMILY MEDICINE | Facility: CLINIC | Age: 63
End: 2023-10-16

## 2023-10-16 ENCOUNTER — LAB (OUTPATIENT)
Dept: LAB | Facility: CLINIC | Age: 63
End: 2023-10-16
Payer: COMMERCIAL

## 2023-10-16 VITALS
OXYGEN SATURATION: 97 % | RESPIRATION RATE: 16 BRPM | DIASTOLIC BLOOD PRESSURE: 80 MMHG | TEMPERATURE: 97.8 F | HEART RATE: 95 BPM | SYSTOLIC BLOOD PRESSURE: 142 MMHG

## 2023-10-16 DIAGNOSIS — S22.41XD CLOSED FRACTURE OF MULTIPLE RIBS OF RIGHT SIDE WITH ROUTINE HEALING, SUBSEQUENT ENCOUNTER: ICD-10-CM

## 2023-10-16 DIAGNOSIS — Z23 ENCOUNTER FOR IMMUNIZATION: ICD-10-CM

## 2023-10-16 DIAGNOSIS — E10.59 TYPE 1 DIABETES MELLITUS WITH OTHER CIRCULATORY COMPLICATION (H): Primary | ICD-10-CM

## 2023-10-16 DIAGNOSIS — R79.89 ELEVATED SERUM CREATININE: ICD-10-CM

## 2023-10-16 DIAGNOSIS — S22.41XA CLOSED FRACTURE OF MULTIPLE RIBS OF RIGHT SIDE, INITIAL ENCOUNTER: ICD-10-CM

## 2023-10-16 DIAGNOSIS — M25.571 PAIN IN JOINT, ANKLE AND FOOT, RIGHT: ICD-10-CM

## 2023-10-16 DIAGNOSIS — I10 ESSENTIAL HYPERTENSION: ICD-10-CM

## 2023-10-16 DIAGNOSIS — N18.32 STAGE 3B CHRONIC KIDNEY DISEASE (H): ICD-10-CM

## 2023-10-16 DIAGNOSIS — L30.9 DERMATITIS: ICD-10-CM

## 2023-10-16 DIAGNOSIS — E10.59 TYPE 1 DIABETES MELLITUS WITH OTHER CIRCULATORY COMPLICATION (H): ICD-10-CM

## 2023-10-16 LAB
ALBUMIN SERPL BCG-MCNC: 3.9 G/DL (ref 3.5–5.2)
ALP SERPL-CCNC: 160 U/L (ref 35–104)
ALT SERPL W P-5'-P-CCNC: 24 U/L (ref 0–50)
ANION GAP SERPL CALCULATED.3IONS-SCNC: 10 MMOL/L (ref 7–15)
AST SERPL W P-5'-P-CCNC: 28 U/L (ref 0–45)
BASO+EOS+MONOS # BLD AUTO: ABNORMAL 10*3/UL
BASO+EOS+MONOS NFR BLD AUTO: ABNORMAL %
BASOPHILS # BLD AUTO: 0 10E3/UL (ref 0–0.2)
BASOPHILS NFR BLD AUTO: 0 %
BILIRUB SERPL-MCNC: <0.2 MG/DL
BUN SERPL-MCNC: 39.1 MG/DL (ref 8–23)
CALCIUM SERPL-MCNC: 9.3 MG/DL (ref 8.8–10.2)
CHLORIDE SERPL-SCNC: 109 MMOL/L (ref 98–107)
CREAT SERPL-MCNC: 1.66 MG/DL (ref 0.51–0.95)
DEPRECATED HCO3 PLAS-SCNC: 23 MMOL/L (ref 22–29)
EGFRCR SERPLBLD CKD-EPI 2021: 34 ML/MIN/1.73M2
EOSINOPHIL # BLD AUTO: 0.1 10E3/UL (ref 0–0.7)
EOSINOPHIL NFR BLD AUTO: 1 %
ERYTHROCYTE [DISTWIDTH] IN BLOOD BY AUTOMATED COUNT: 13.9 % (ref 10–15)
GLUCOSE SERPL-MCNC: 358 MG/DL (ref 70–99)
HCT VFR BLD AUTO: 36.4 % (ref 35–47)
HGB BLD-MCNC: 11.5 G/DL (ref 11.7–15.7)
IMM GRANULOCYTES # BLD: 0 10E3/UL
IMM GRANULOCYTES NFR BLD: 0 %
LYMPHOCYTES # BLD AUTO: 1.6 10E3/UL (ref 0.8–5.3)
LYMPHOCYTES NFR BLD AUTO: 26 %
MCH RBC QN AUTO: 28.5 PG (ref 26.5–33)
MCHC RBC AUTO-ENTMCNC: 31.6 G/DL (ref 31.5–36.5)
MCV RBC AUTO: 90 FL (ref 78–100)
MONOCYTES # BLD AUTO: 0.4 10E3/UL (ref 0–1.3)
MONOCYTES NFR BLD AUTO: 6 %
NEUTROPHILS # BLD AUTO: 3.9 10E3/UL (ref 1.6–8.3)
NEUTROPHILS NFR BLD AUTO: 67 %
NRBC # BLD AUTO: 0 10E3/UL
NRBC BLD AUTO-RTO: 0 /100
PLATELET # BLD AUTO: 205 10E3/UL (ref 150–450)
POTASSIUM SERPL-SCNC: 4.3 MMOL/L (ref 3.4–5.3)
PROT SERPL-MCNC: 6.7 G/DL (ref 6.4–8.3)
RBC # BLD AUTO: 4.03 10E6/UL (ref 3.8–5.2)
SODIUM SERPL-SCNC: 142 MMOL/L (ref 135–145)
TSH SERPL DL<=0.005 MIU/L-ACNC: 0.59 UIU/ML (ref 0.3–4.2)
WBC # BLD AUTO: 6 10E3/UL (ref 4–11)

## 2023-10-16 PROCEDURE — 84443 ASSAY THYROID STIM HORMONE: CPT | Performed by: PATHOLOGY

## 2023-10-16 PROCEDURE — 90662 IIV NO PRSV INCREASED AG IM: CPT | Performed by: FAMILY MEDICINE

## 2023-10-16 PROCEDURE — G0008 ADMIN INFLUENZA VIRUS VAC: HCPCS | Performed by: FAMILY MEDICINE

## 2023-10-16 PROCEDURE — 80053 COMPREHEN METABOLIC PANEL: CPT | Performed by: PATHOLOGY

## 2023-10-16 PROCEDURE — 36415 COLL VENOUS BLD VENIPUNCTURE: CPT | Performed by: PATHOLOGY

## 2023-10-16 PROCEDURE — 99215 OFFICE O/P EST HI 40 MIN: CPT | Mod: 25 | Performed by: FAMILY MEDICINE

## 2023-10-16 PROCEDURE — 85025 COMPLETE CBC W/AUTO DIFF WBC: CPT | Performed by: PATHOLOGY

## 2023-10-16 PROCEDURE — 91320 SARSCV2 VAC 30MCG TRS-SUC IM: CPT | Performed by: FAMILY MEDICINE

## 2023-10-16 PROCEDURE — 90480 ADMN SARSCOV2 VAC 1/ONLY CMP: CPT | Performed by: FAMILY MEDICINE

## 2023-10-16 RX ORDER — LIDOCAINE 4 G/G
1 PATCH TOPICAL EVERY 24 HOURS
Qty: 15 PATCH | Refills: 0 | Status: ON HOLD | OUTPATIENT
Start: 2023-10-16 | End: 2024-06-18

## 2023-10-16 RX ORDER — NYSTATIN 100000 U/G
CREAM TOPICAL 2 TIMES DAILY
Qty: 30 G | Refills: 3 | Status: ON HOLD | OUTPATIENT
Start: 2023-10-16 | End: 2024-08-28

## 2023-10-16 RX ORDER — CYCLOBENZAPRINE HCL 10 MG
10 TABLET ORAL AT BEDTIME
Qty: 30 TABLET | Refills: 0 | Status: SHIPPED | OUTPATIENT
Start: 2023-10-16 | End: 2024-05-20

## 2023-10-16 RX ORDER — AMLODIPINE BESYLATE 5 MG/1
5 TABLET ORAL DAILY
Qty: 90 TABLET | Refills: 3 | Status: SHIPPED | OUTPATIENT
Start: 2023-10-16

## 2023-10-16 RX ORDER — CARVEDILOL 12.5 MG/1
12.5 TABLET ORAL 2 TIMES DAILY WITH MEALS
Qty: 180 TABLET | Refills: 3 | Status: SHIPPED | OUTPATIENT
Start: 2023-10-16 | End: 2024-01-29

## 2023-10-16 ASSESSMENT — ANXIETY QUESTIONNAIRES
GAD7 TOTAL SCORE: 0
2. NOT BEING ABLE TO STOP OR CONTROL WORRYING: NOT AT ALL
6. BECOMING EASILY ANNOYED OR IRRITABLE: NOT AT ALL
GAD7 TOTAL SCORE: 0
IF YOU CHECKED OFF ANY PROBLEMS ON THIS QUESTIONNAIRE, HOW DIFFICULT HAVE THESE PROBLEMS MADE IT FOR YOU TO DO YOUR WORK, TAKE CARE OF THINGS AT HOME, OR GET ALONG WITH OTHER PEOPLE: NOT DIFFICULT AT ALL
7. FEELING AFRAID AS IF SOMETHING AWFUL MIGHT HAPPEN: NOT AT ALL
3. WORRYING TOO MUCH ABOUT DIFFERENT THINGS: NOT AT ALL
5. BEING SO RESTLESS THAT IT IS HARD TO SIT STILL: NOT AT ALL
1. FEELING NERVOUS, ANXIOUS, OR ON EDGE: NOT AT ALL

## 2023-10-16 ASSESSMENT — PATIENT HEALTH QUESTIONNAIRE - PHQ9
5. POOR APPETITE OR OVEREATING: NOT AT ALL
SUM OF ALL RESPONSES TO PHQ QUESTIONS 1-9: 5

## 2023-10-16 NOTE — PROGRESS NOTES
Type of Form Received:     Form Received (Date) 10/16/23   Form Filled out Yes 10/18/23   Placed in provider folder Yes

## 2023-10-16 NOTE — PATIENT INSTRUCTIONS
Get RSV through local pharmacy      Continuous glucose meter through Specialty pharmacy if not covered by [facility]  [phone]

## 2023-10-16 NOTE — PROGRESS NOTES
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  I would recommend adult endocrinology consult.  - insulin aspart (NOVOLOG PEN) 100 UNIT/ML pen  Dispense: 15 mL; Refill: 0  - insulin aspart (NOVOLOG PEN) 100 UNIT/ML pen  Dispense: 15 mL; Refill: 0  - Continuous Blood Gluc  (FREESTYLE MARIA FERNANDA 2 READER) MAGGIE  Dispense: 1 each; Refill: 0  - Continuous Blood Gluc Sensor (FREESTYLE MARIA FERNANDA 2 SENSOR) MISC  Dispense: 2 each; Refill: 5  - Adult Endocrinology  Referral  - insulin glargine (LANTUS PEN) 100 UNIT/ML pen  Dispense: 30 mL; Refill: 3  - CBC with platelets and differential  - Comprehensive metabolic panel (BMP + Alb, Alk Phos, ALT, AST, Total. Bili, TP)  - TSH with free T4 reflex    Essential hypertension  Blood pressure adequately controlled we will continue on amlodipine 5 mg daily and carvedilol 12.5 mg twice daily May discontinue Cardura  - amLODIPine (NORVASC) 5 MG tablet  Dispense: 90 tablet; Refill: 3  - carvedilol (COREG) 12.5 MG tablet  Dispense: 180 tablet; Refill: 3  - Comprehensive metabolic panel (BMP + Alb, Alk Phos, ALT, AST, Total. Bili, TP)    Encounter for immunization  Provided today, updated her health maintenance  - COVID-19 12+ (2023-24) (PFIZER)  - INFLUENZA VACCINE 65+ (FLUZONE HD)    Closed fracture of multiple ribs of right side with routine healing, subsequent encounter  She has some muscle pain related to her her ribs were fractured and chest wall discomfort we will trial Flexeril at bedtime short course and also may use topical diclofenac gel - cyclobenzaprine (FLEXERIL) 10 MG tablet  Dispense: 30 tablet; Refill: 0    Pain in joint, ankle and foot, right  utilize topical diclofenac gel  - diclofenac (VOLTAREN) 1 % topical gel  Dispense: 150 g; Refill:  1    Dermatitis  She is having some intertrigo yeast needs refills  - nystatin (MYCOSTATIN) 818630 UNIT/GM external cream  Dispense: 30 g; Refill: 3    Closed fracture of multiple ribs of right side, initial encounter  Lidocaine patches helped with the pain in the past  - Lidocaine (LIDOCARE) 4 % Patch  Dispense: 15 patch; Refill: 0    Stage 3b chronic kidney disease (H)  Due for urine microalbumin, continue to hydrate and try to control glucose levels.  - Albumin Random Urine Quantitative with Creat Ratio    Today we had a long discussion related to housing they both feel she is doing acceptable in her current living situation and they do not want to pursue assisted living or skilled nursing.  Receiving home care.  44 minutes spent on the date of the encounter doing chart review, history, exam, diagnostics review, documentation, counseling and coordination of cares as noted.                Return in about 3 months (around 1/16/2024) for follow-up.    Bony Castaneda MD  [facility] [place]    Megan Whyte is a 63 year old, presenting for the following health issues:  Follow Up (Pt following up on a fall and broken ribs) and Recheck Medication (Pt has questions about medications - Amlodipine and Carvedilol)        10/16/2023     4:13 PM   Additional Questions   Roomed by BARBARA   Accompanied by Daughter, Charli Whyte is a 63 year old female with an extensive past medical history including hypertension, hyperlipidemia, diabetes mellitus insulin requiring, CKD stage 3b, coronary artery disease (s/p PCI 2004), hypothyroidism, degenerative arthritis, HX UTI, and prior history of ischemic stroke & Vascular dementia, seizure disorder who presented to the ED for status epilepticus and encephalopathy 12/10/22 hospitalized through 12/14/22, hospitalized 6/16 and 9/4/2023  sent home with her Daughter Adrienne personal caregiver.    Fall  Reason for most recent hospitalization  in September was a fall.  Daughter Maria Guadalupe feels that she can help continue to be her mother's PCA and they will currently like her living situation.  Mother Lisa wants to continue to live with her daughter.  They will explore alternative living situations but they do not have the finances.  Seizure  Taking Keppra 500 mg twice daily.  Diabetes hyperlipidemia  Lisa had endocrine consult while she was in the hospital with dose adjust of insulin to very low.  Gali thinks this level was too low and typically her dose of Lantus is 18 units once daily which she has been providing without any low readings.  They do not have a continuous glucose meter as previous one was applied to her abdomen and due to her adult diapers fell off frequently.  I reviewed there is another type of continuous glucose meter that could be placed on her arm they agreed to this device.   Today we are updating her short acting insulin and Lantus doses to reflect what she is taking at home.  Taking atorvastatin 40 mg daily.  Hypertension  Amlodipine 5 mg, Cardura 1 mg at bedtime, carvedilol 12.5 mg twice daily thinks she is on too many medications and would like to discontinue Cardura at bedtime  Mood and pain  She remains on Cymbalta 20 mg daily.  She has done better off of Lyrica is more alert.  Not utilizing melatonin prior to bed.  Healthcare maintenance reviewed              Labs reviewed in EPIC  BP Readings from Last 3 Encounters:   10/16/23 (!) 142/80   09/14/23 106/60   09/06/23 (!) 151/71    Wt Readings from Last 3 Encounters:   09/04/23 59.7 kg (131 lb 9.8 oz)   06/15/23 59 kg (130 lb 1.1 oz)   03/15/23 57.7 kg (127 lb 1.6 oz)                  Patient Active Problem List   Diagnosis    Acquired hypothyroidism    Seizures (H)    Hyperlipidemia LDL goal <100    DKA (diabetic ketoacidoses)    Nephrolithiasis    Left renal mass    Coronary atherosclerosis    Dehydration    Ischemic vascular dementia (H)    Other osteoarthritis of spine,  unspecified spinal region    Vitamin D deficiency    Vitamin B12 deficiency (non anemic)    Seizures (H)    Nausea & vomiting    Nail deformity    Mixed stress and urge urinary incontinence    Luetscher's syndrome    Left-sided nontraumatic intracerebral hemorrhage, unspecified cerebral location (H)    Vascular dementia without behavioral disturbance (H)    Fungal dermatitis    Type 1 diabetes mellitus with diabetic polyneuropathy (H)    Chest pressure    Essential hypertension    Dysphagia    Tobacco use    Dysthymia    History of diabetes with ketoacidosis    Stage 3b chronic kidney disease (H)    Full incontinence of feces    Acute CVA (cerebrovascular accident) (H)    Altered mental status, unspecified altered mental status type    Hyperglycemia    Infection due to 2019 novel coronavirus    Hx: UTI (urinary tract infection)    Fibromyalgia    E. 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)    Acute cystitis without hematuria    Cerebrovascular accident (CVA), unspecified mechanism (H)    Severe dementia without behavioral disturbance, psychotic disturbance, mood disturbance, or anxiety, unspecified dementia type (H)    Diabetic ketoacidosis without coma associated with type 1 diabetes mellitus (H)    Cognitive decline    Nausea and vomiting, unspecified vomiting type    Closed fracture of multiple ribs of right side, initial encounter     Past Surgical History:   Procedure Laterality Date    athroplasty hip Bilateral     2003, 2006    OTHER SURGICAL HISTORY      athroplasty hip    PICC DOUBLE LUMEN PLACEMENT Right 06/11/2023    right basilic 5 fr dl power picc 39 cm    STENT         Social History     Tobacco Use    Smoking status: Former     Packs/day: 0.50     Years: 35.00     Additional pack years: 0.00     Total pack years: 17.50     Types: Cigarettes      Quit date: 2018     Years since quittin.2    Smokeless tobacco: Never   Substance Use Topics    Alcohol use: Not Currently     Family History   Problem Relation Age of Onset    Acute Myocardial Infarction Father     Heart Disease Maternal Grandmother     Dementia Maternal Grandmother     Myocardial Infarction Father     Dementia Paternal Grandmother     Heart Disease Paternal Grandmother          Current Outpatient Medications   Medication Sig Dispense Refill    acetaminophen (TYLENOL) 325 MG tablet Take 3 tablets (975 mg) by mouth every 8 hours      amLODIPine (NORVASC) 5 MG tablet Take 1 tablet (5 mg) by mouth daily 90 tablet 3    aspirin (ASA) 81 MG chewable tablet Take 1 tablet (81 mg) by mouth daily 90 tablet 3    atorvastatin (LIPITOR) 40 MG tablet Take 1 tablet (40 mg) by mouth daily 90 tablet 3    blood glucose (TRUE METRIX BLOOD GLUCOSE TEST) test strip USE TO TEST BLOOD SUGAR 4 TO 5 TIMES DAILY OR AS DIRECTED 400 strip 3    blood glucose monitoring (NO BRAND SPECIFIED) meter device kit Use to test blood sugar 4 times daily.  Please provide glucose meter that is covered by insurance. 1 kit 0    Continuous Blood Gluc  (FREESTYLE MARIA FERNANDA 2 READER) MAGGIE 1 each once for 1 dose Use to read blood sugars per 's instructions. 1 each 0    Continuous Blood Gluc Sensor (FREESTYLE MARIA FERNANDA 2 SENSOR) MISC 1 each every 14 days Use 1 sensor every 14 days. Use to read blood sugars per 's instructions. 2 each 5    cyanocobalamin (VITAMIN B-12) 1000 MCG tablet Take 1 tablet (1,000 mcg) by mouth daily 100 tablet 3    diclofenac (VOLTAREN) 1 % topical gel Apply 2 g topically 4 times daily as needed for moderate pain 150 g 1    doxazosin (CARDURA) 1 MG tablet Take 1 tablet (1 mg) by mouth At Bedtime 90 tablet 3    DULoxetine (CYMBALTA) 20 MG capsule Take 1 capsule (20 mg) by mouth daily 90 capsule 3    gabapentin (NEURONTIN) 100 MG capsule Take 1 capsule (100 mg) by mouth At Bedtime 90 capsule  1    insulin aspart (NOVOLOG PEN) 100 UNIT/ML pen DOSE:  1 units per 25 grams of carbohydrate.  Only chart total amount of units given.  Do not give if pre-prandial glucose is less than 60 mg/dL. If given at mealtime, administer within 30 minutes of start of meal. 15 mL 0    insulin aspart (NOVOLOG PEN) 100 UNIT/ML pen LOW INSULIN RESISTANCE DOSING  Do Not give Bedtime Correction Insulin if BG less than 225. For - 299 give 1 unit. For  - 399 give 2 units For BG greater than or equal 400 give 3 units. 15 mL 0    insulin glargine (LANTUS PEN) 100 UNIT/ML pen Inject 4 Units Subcutaneous every 24 hours 15 mL 0    insulin pen needle (31G X 8 MM) 31G X 8 MM miscellaneous Use 4 pen needles daily or as directed. 300 each 4    levETIRAcetam (KEPPRA) 500 MG tablet Take 1 tablet (500 mg) by mouth 2 times daily 180 tablet 3    Lidocaine (LIDOCARE) 4 % Patch Place 1 patch onto the skin every 24 hours To prevent lidocaine toxicity, patient should be patch free for 12 hrs daily. 15 patch 0    loratadine (CLARITIN) 10 MG tablet Take 1 tablet (10 mg) by mouth daily 90 tablet 3    Melatonin 10 MG TABS tablet Take 1 tablet (10 mg) by mouth nightly as needed for sleep 90 tablet 3    METHYLCELLULOSE, LAXATIVE, PO Take 1 Tablespoonful by mouth daily as needed      nystatin (MYCOSTATIN) 232139 UNIT/GM external cream Apply topically 2 times daily Until rash clear 30 g 1    prochlorperazine (COMPAZINE) 10 MG tablet Take 1 tablet (10 mg) by mouth every 6 hours as needed for nausea or vomiting 30 tablet 0    senna-docusate (SENOKOT-S/PERICOLACE) 8.6-50 MG tablet Take 1 tablet by mouth 2 times daily 12 tablet 0    SYNTHROID 75 MCG tablet Take 1 tablet (75 mcg) by mouth daily 90 tablet 3    Vitamin D3 (CHOLECALCIFEROL) 125 MCG (5000 UT) tablet Take 1 tablet (125 mcg) by mouth daily 90 tablet 3    zinc oxide (DESITIN) 20 % external ointment Apply topically as needed for dry skin or irritation 85 g 3    carvedilol (COREG) 12.5 MG  tablet Take 1 tablet (12.5 mg) by mouth 2 times daily (with meals) (Patient not taking: Reported on 10/16/2023) 180 tablet 3     Allergies   Allergen Reactions    Seasonal Allergies      Recent Labs   Lab Test 09/06/23  0609 09/05/23  0645 09/04/23  0228 06/18/23  0745 06/17/23  0655 06/16/23  0911 06/11/23  0815 06/11/23  0623 05/21/23  0627 05/20/23  1312 03/29/23  1158 04/06/22  0407 04/06/22  0014 08/02/21  1009 07/07/21  0630 06/27/21  0759   A1C  --   --  9.3*  --   --   --   --   --   --  10.8* 11.2*   < >  --    < >  --   --    LDL  --   --   --   --   --   --   --   --   --  71 72  --  39   < >  --   --    HDL  --   --   --   --   --   --   --   --   --  35* 48*  --  34*   < >  --   --    TRIG  --   --   --   --   --   --   --   --   --  152* 105  --  129   < >  --   --    ALT  --   --   --  24 23 31   < >  --    < > 17 39*   < >  --    < >  --   --    CR 1.68* 1.56* 1.64* 1.25* 1.33* 1.42*   < > 1.81*   < > 1.71*  1.71* 1.62*   < > 1.20*   < > 1.68* 1.48*   GFRESTIMATED 33* 36* 34* 48* 44* 41*   < > 31*   < > 33*  33* 35*   < > 51*   < > 31* 37*   GFRESTBLACK  --   --   --   --   --   --   --   --   --   --   --   --   --   --  37* 43*   POTASSIUM 4.0 4.0 4.3 4.2 4.3 3.8   < > 4.5   < > 4.3 4.1   < >  --    < > 3.9 3.5   TSH  --  2.86  --   --   --   --   --  1.13  --   --  2.38   < >  --    < >  --   --     < > = values in this interval not displayed.         Review of Systems   Problem list, PMH, Surgical HX, FH, SH, allergies, medications,immunizations reviewed and updated in Epic. 10 point ROS negative other than noted in HPI and ROS.       Objective    BP (!) 142/80 (BP Location: Right arm, Patient Position: Sitting, Cuff Size: Adult Regular)   Pulse 95   Temp 97.8  F (36.6  C) (Oral)   Resp 16   SpO2 97%   There is no height or weight on file to calculate BMI.  Physical Exam   Constitutional: Sitting in a wheelchair sleepy but awakens and indicates her wishes, She has her daughter review HX,  chronic poor health, well-groomed, cooperative.  HENT: TM normal.  Right pinna pedunculated growth she indicates has been present for years without change.  Head: Normocephalic and atraumatic.   Mouth/Throat: hydrated, thrush, she has dental decay and a loose tooth in the molar on the upper right.  Eyes: Conjunctivae and EOM are normal. Pupils are equal, round, and reactive to light. No scleral icterus.   Neck:  Neck supple.  No tracheal deviation present. No thyromegaly present.   Cardiovascular: Regular rhythm, normal heart sounds and intact distal pulses. No murmur present. Exam reveals no gallop and no friction rub.   Pulmonary/Chest: Effort normal and breath sounds normal. No respiratory distress.   Abdominal: Exam in chair soft obese. Bowel sounds are normal. No distension and no mass. No tenderness.   Musculoskeletal: Deconditioned upper and lower extremities.   No edema and no tenderness. No bruising tenderness right ribs.  Neurological: Sleepy, cooperative.  Signs of vascular dementia. Able to move bilateral upper and lower extremities   Skin: No ulcerations or bruising noted on exposed skin   Psychiatric: Cooperative, communicative through nods and a few words     Results for orders placed or performed in visit on 10/16/23   Comprehensive metabolic panel (BMP + Alb, Alk Phos, ALT, AST, Total. Bili, TP)     Status: Abnormal   Result Value Ref Range    Sodium 142 135 - 145 mmol/L    Potassium 4.3 3.4 - 5.3 mmol/L    Carbon Dioxide (CO2) 23 22 - 29 mmol/L    Anion Gap 10 7 - 15 mmol/L    Urea Nitrogen 39.1 (H) 8.0 - 23.0 mg/dL    Creatinine 1.66 (H) 0.51 - 0.95 mg/dL    GFR Estimate 34 (L) >60 mL/min/1.73m2    Calcium 9.3 8.8 - 10.2 mg/dL    Chloride 109 (H) 98 - 107 mmol/L    Glucose 358 (H) 70 - 99 mg/dL    Alkaline Phosphatase 160 (H) 35 - 104 U/L    AST 28 0 - 45 U/L    ALT 24 0 - 50 U/L    Protein Total 6.7 6.4 - 8.3 g/dL    Albumin 3.9 3.5 - 5.2 g/dL    Bilirubin Total <0.2 <=1.2 mg/dL   TSH with free  T4 reflex     Status: Normal   Result Value Ref Range    TSH 0.59 0.30 - 4.20 uIU/mL   CBC with platelets and differential     Status: Abnormal   Result Value Ref Range    WBC Count 6.0 4.0 - 11.0 10e3/uL    RBC Count 4.03 3.80 - 5.20 10e6/uL    Hemoglobin 11.5 (L) 11.7 - 15.7 g/dL    Hematocrit 36.4 35.0 - 47.0 %    MCV 90 78 - 100 fL    MCH 28.5 26.5 - 33.0 pg    MCHC 31.6 31.5 - 36.5 g/dL    RDW 13.9 10.0 - 15.0 %    Platelet Count 205 150 - 450 10e3/uL    % Neutrophils 67 %    % Lymphocytes 26 %    % Monocytes 6 %    Mids % (Monos, Eos, Basos)      % Eosinophils 1 %    % Basophils 0 %    % Immature Granulocytes 0 %    NRBCs per 100 WBC 0 <1 /100    Absolute Neutrophils 3.9 1.6 - 8.3 10e3/uL    Absolute Lymphocytes 1.6 0.8 - 5.3 10e3/uL    Absolute Monocytes 0.4 0.0 - 1.3 10e3/uL    Mids Abs (Monos, Eos, Basos)      Absolute Eosinophils 0.1 0.0 - 0.7 10e3/uL    Absolute Basophils 0.0 0.0 - 0.2 10e3/uL    Absolute Immature Granulocytes 0.0 <=0.4 10e3/uL    Absolute NRBCs 0.0 10e3/uL   CBC with platelets and differential     Status: Abnormal    Narrative    The following orders were created for panel order CBC with platelets and differential.  Procedure                               Abnormality         Status                     ---------                               -----------         ------                     CBC with platelets and d...[[id number]]  Abnormal            Final result                 Please view results for these tests on the individual orders.    Above reviewed after visit.  Bony Castaneda MD

## 2023-10-16 NOTE — COMMUNITY RESOURCES LIST (ENGLISH)
10/16/2023   [facility] - Outpatient Clinics  N/A  For additional resource needs, please contact your health insurance member services or your primary care team.  Phone: [phone]   Email: N/A   Address: [address] [place] [id number]   Hours: N/A        Food and Nutrition       Food pantry  1  [facility] - [place] Food Shelf Distance: 0.71 miles      In-Person, Pickup   [address] [place] [address]  Language: Estonian, English, Bruneian, Oromo, Chinese, Lithuanian  Hours: Mon 11:00 AM - 3:00 PM , Tue - Wed 2:00 PM - 6:00 PM , Thu 11:00 AM - 3:00 PM  Fees: Free   Phone: (210) 809-5064 Email: agencyinfo@[facility] [place]org Website: https://www.[facility] [place]org/metrofoodprograms     2  [facility] Food Shelf Distance: 1.36 miles      In-Person, Delivery   [address] [place] [address]  Language: English, Lithuanian  Hours: Mon - Fri 10:00 AM - 12:00 PM , Mon - Tue 1:30 PM - 3:30 PM , Wed 5:00 PM - 7:00 PM , Thu - Fri 1:30 PM - 3:30 PM  Fees: Free   Phone: (830) 931-8203 Email: info@[employer] Website: https://[employer]/food-shelves/     SNAP application assistance  3  [employer] [place] Distance: 5.04 miles      Phone/[address] [address]  Language: English, Hmong, Greek, Chinese, Lithuanian  Hours: Mon - Fri 8:30 AM - 4:30 PM  Fees: Free   Phone: (408) 622-7125 Email: helpline@[employer].org Website: https://www.[employer].org/programs/[place]-food-helpline/     4  [facility] - SNAP Ed - SNAP Ed - SNAP application assistance Distance: 1.76 miles      In-Person   [address] [place] [address]  Language: English, Hmong, Oromo, Chinese, Lithuanian  Hours: Mon - Fri 9:00 AM - 5:00 PM Appt. Only  Fees: Free   Phone: (955) 448-7585 Email: fifit@[facility] Website: https://extension.[facility]/nutrition-education/snap-ed-educational-offerings     Soup kitchen or  free meals  5  [facility] - Free Summer Meals Distance: 1.9 miles      In-Person   [address] [place] [address]  Language: English  Hours: Mon - Fri 12:30 PM - 1:30 PM , Mon - Fri 4:00 PM - 5:00 PM  Fees: Free   Phone: (828) 990-9556 Email: ibis@.[facility].[place]. Website: https://www.[facility]/facilities/voaqnqw-pzrmmjpnvq-benmfv     6  [employer] [place] Distance: 2.01 miles      Pickup   [address] [place] [address]  Language: English  Hours: Mon 12:00 PM - 2:00 PM , Wed 12:00 PM - 2:00 PM  Fees: Free   Phone: (114) 778-5551 Ext.4 Email: info@[employer] Website: http://www.[employer]          Important Numbers & Websites       [facility]   [address]org  Poison Control   (559) 360-3184 [place]  Suicide and Crisis Lifeline   [address] [place], [place] [address]line.org  [employer] [place] Child Abuse Hotline   993.482.7683 Childhelphotline.org  National Sexual Assault Hotline   (780) 469-5417 (HOPE) Rainn.org  National Runaway Safeline   (566) 947-9230 (RUNAWAY) [facility].org  Pregnancy & Postpartum Support [place]   Call/text 246-909-6352 Ppsupport[place].org  Substance Abuse National Helpline ([facility]   181-040-HELP (8108) Findtreatment.gov  Emergency Services   911

## 2023-10-16 NOTE — PROGRESS NOTES
Isabell is a 63 year old that presents in clinic today for the following:     Chief Complaint   Patient presents with    Follow Up     Pt following up on a fall and broken ribs    Recheck Medication     Pt has questions about medications - Amlodipine and Carvedilol           10/16/2023     4:13 PM   Additional Questions   Roomed by BARBARA   Accompanied by Daughter, Charli       Screenings from encounters over the past 10 days    No data recorded       Eddie Cruz at 4:21 PM on 10/16/2023

## 2023-10-16 NOTE — COMMUNITY RESOURCES LIST (ENGLISH)
10/16/2023   [facility] - Outpatient Clinics  N/A  For additional resource needs, please contact your health insurance member services or your primary care team.  Phone: [phone]   Email: N/A   Address: [address] [place] [id number]   Hours: N/A        Food and Nutrition       Food pantry  1  [facility] [place] Food Shelf Distance: 0.71 miles      In-Person, Pickup   [address] [place] [address]  Language: Latvian, English, Citizen of the [place], Oromo, Dutch, Micronesian  Hours: Mon 11:00 AM - 3:00 PM , Tue - Wed 2:00 PM - 6:00 PM , Thu 11:00 AM - 3:00 PM  Fees: Free   Phone: (755) 463-6342 Email: agencyinfo@[facility].org Website: https://www.[facility].org/metrofoodprograms     2  [facility] Food Shelf Distance: 1.36 miles      In-Person, Delivery   [address] [place] [address]  Language: English, Micronesian  Hours: Mon - Fri 10:00 AM - 12:00 PM , Mon - Tue 1:30 PM - 3:30 PM , Wed 5:00 PM - 7:00 PM , Thu - Fri 1:30 PM - 3:30 PM  Fees: Free   Phone: (914) 698-5454 Email: info@[employer] [place] Website: https://[place].[employer]/food-shelves/     SNAP application assistance  3  [employer] [place] Distance: 5.04 miles      Phone/[address] [address]  Language: English, Hmong, Gabonese, Dutch, Micronesian  Hours: Mon - Fri 8:30 AM - 4:30 PM  Fees: Free   Phone: (587) 351-6802 Email: helpline@[employer].org Website: https://www.[employer]ers[employer].org/programs/[place]-food-helpline/     4  [facility] - SNAP Ed - SNAP Ed - SNAP application assistance Distance: 1.76 miles      In-Person   [address] [place] [address]  Language: English, Hmong, Oromo, Dutch, Micronesian  Hours: Mon - Fri 9:00 AM - 5:00 PM Appt. Only  Fees: Free   Phone: (434) 338-7791 Email: fifit@[facility] Website: https://extension.[facility]/nutrition-education/snap-ed-educational-offerings     Soup kitchen or  free meals  5  [facility] - Free Summer Meals Distance: 1.9 miles      In-Person   [address] [place] [address]  Language: English  Hours: Mon - Fri 12:30 PM - 1:30 PM , Mon - Fri 4:00 PM - 5:00 PM  Fees: Free   Phone: (606) 519-2030 Email: ibis@.[facility].mn. Website: https://www.[facility]/facilities/yamjycs-zaeboonjsq-cczxva     6  [employer] [place] Distance: 2.01 miles      Pickup   [address] [place] [address]  Language: English  Hours: Mon 12:00 PM - 2:00 PM , Wed 12:00 PM - 2:00 PM  Fees: Free   Phone: (878) 777-4359 Ext.4 Email: info@[employer].gulu.com Website: http://www.[employer].gulu.com          Important Numbers & Websites       [facility]   [address].org  Poison Control   (209) 449-3718 [place]  Suicide and Crisis Lifeline   [address] [place], [place] [address]line.org  [facility] [place] Child Abuse Hotline   609.982.4369 Childhelphotline.org  National Sexual Assault Hotline   (774) 263-5411 (HOPE) Rainn.org  National Runaway Safeline   (235) 728-6325 (RUNAWAY) [facility] Incrunaway.org  Pregnancy & Postpartum Support [place]   Call/text 013-770-1613 Ppsupport[place].org  Substance Abuse National Helpline ([facility]   807-894-HELP (7879) Findtreatment.gov  Emergency Services   911

## 2023-10-17 NOTE — RESULT ENCOUNTER NOTE
Dear Isabell Matthews    Results show glucose is high at 358. We discussed obtain a continuous glucose meter to optimize glucose management referral to diabetes endocrine,  provided refills of long acting and short acting insulin.  Your renal ( Kidney) function is impaired  with GFR 34 stage 3b renal impairment. Diabetes control is important for kidney function. Make sure you are hydrating well with water.  Your thyroid, complete blood count including hemoglobin 11.5 almost normal,white cell count for infection, liver, calcium, sodium and potassium were normal or within acceptable range.   Best wishes,  Bony Castaneda MD

## 2023-10-20 ENCOUNTER — TELEPHONE (OUTPATIENT)
Dept: FAMILY MEDICINE | Facility: CLINIC | Age: 63
End: 2023-10-20
Payer: MEDICAID

## 2023-10-20 DIAGNOSIS — E10.59 TYPE 1 DIABETES MELLITUS WITH OTHER CIRCULATORY COMPLICATION (H): Primary | ICD-10-CM

## 2023-10-20 RX ORDER — INSULIN LISPRO 100 [IU]/ML
INJECTION, SOLUTION INTRAVENOUS; SUBCUTANEOUS
Qty: 15 ML | Refills: 0 | Status: SHIPPED | OUTPATIENT
Start: 2023-10-20 | End: 2023-11-10

## 2023-10-20 NOTE — TELEPHONE ENCOUNTER
[facility] Call Center    Phone Message    May a detailed message be left on voicemail: yes     Reason for Call: Patient's Daughter Vishal called stating we sent over a prescription for Novolog and they need a prescription for Humalog sent to   [id number] - [place], [place] - [address] AT [facility] [address]    in order for insurance to cover it. They only have maybe 10 units left. Nehalmami was at pharmacy but will go back today, please call and advise ASAP.

## 2023-10-30 ENCOUNTER — MEDICAL CORRESPONDENCE (OUTPATIENT)
Dept: HEALTH INFORMATION MANAGEMENT | Facility: CLINIC | Age: 63
End: 2023-10-30
Payer: MEDICAID

## 2023-10-31 ENCOUNTER — DOCUMENTATION ONLY (OUTPATIENT)
Dept: FAMILY MEDICINE | Facility: CLINIC | Age: 63
End: 2023-10-31
Payer: MEDICAID

## 2023-10-31 NOTE — PROGRESS NOTES
Type of Form Received: Discharge from services    Form Received (Date) 10/31/23   Form Filled out No   Placed in provider folder Yes

## 2023-11-07 ENCOUNTER — DOCUMENTATION ONLY (OUTPATIENT)
Dept: FAMILY MEDICINE | Facility: CLINIC | Age: 63
End: 2023-11-07
Payer: MEDICAID

## 2023-11-07 NOTE — PROGRESS NOTES
Type of Form Received:     Form Received (Date) 11/7/23   Form Filled out Yes 11/9/23   Placed in provider folder Yes

## 2023-11-08 ENCOUNTER — MEDICAL CORRESPONDENCE (OUTPATIENT)
Dept: HEALTH INFORMATION MANAGEMENT | Facility: CLINIC | Age: 63
End: 2023-11-08
Payer: MEDICAID

## 2023-11-10 ENCOUNTER — TELEPHONE (OUTPATIENT)
Dept: FAMILY MEDICINE | Facility: CLINIC | Age: 63
End: 2023-11-10
Payer: MEDICAID

## 2023-11-10 DIAGNOSIS — E10.59 TYPE 1 DIABETES MELLITUS WITH OTHER CIRCULATORY COMPLICATION (H): ICD-10-CM

## 2023-11-10 RX ORDER — INSULIN LISPRO 100 [IU]/ML
INJECTION, SOLUTION INTRAVENOUS; SUBCUTANEOUS
Qty: 15 ML | Refills: 0 | Status: SHIPPED | OUTPATIENT
Start: 2023-11-10 | End: 2024-01-29

## 2023-12-17 ENCOUNTER — HEALTH MAINTENANCE LETTER (OUTPATIENT)
Age: 63
End: 2023-12-17

## 2024-01-14 NOTE — TELEPHONE ENCOUNTER
[facility] Call Center    Phone Message    May a detailed message be left on voicemail: yes     Reason for Call: Order(s): Home Care Orders: Other: OK for speech therapy eval for next week 7/26/21.      Action Taken: Message routed to:  [facility] pcc    Travel Screening: Not Applicable                                                                         [facility] ([facility])  Nephrology  Progress Note    Patient Name: Moody Silverio  MRN: [id number]  Admission Date: 1/10/2024  Hospital Length of Stay: 4 days  Attending Provider: Abhijeet Kennedy MD   Primary Care Physician: Jersey Nolan MD  Principal Problem:Acute kidney injury superimposed on CKD    Subjective:     HPI: noted    Interval History: Pt was seen and examined. Labs and meds reviewed. Discussed with other providers. No new events, pt is more sleepy and seems to have more SOB. Bumex was changed from IV to po yesterday.    Review of patient's allergies indicates:   Allergen Reactions    Ceftriaxone Itching and Swelling     Current Facility-Administered Medications   Medication Frequency    0.9%  NaCl infusion (for blood administration) Q24H PRN    amLODIPine tablet 10 mg Daily    atorvastatin tablet 40 mg Nightly    bumetanide tablet 2 mg Q12H    carvediloL tablet 25 mg BID WM    cloNIDine tablet 0.1 mg BID    clopidogreL tablet 75 mg Daily    dextrose 10% bolus 125 mL 125 mL PRN    dextrose 10% bolus 125 mL 125 mL PRN    dextrose 10% bolus 250 mL 250 mL PRN    dextrose 10% bolus 250 mL 250 mL PRN    DULoxetine DR capsule 60 mg Daily    enoxaparin injection 30 mg Q24H (prophylaxis, 1700)    ferrous sulfate tablet 1 each Daily    fluticasone propionate 50 mcg/actuation nasal spray 100 mcg Daily    gabapentin capsule 300 mg BID    glucagon (human recombinant) injection 1 mg PRN    glucagon (human recombinant) injection 1 mg PRN    glucose chewable tablet 16 g PRN    glucose chewable tablet 16 g PRN    glucose chewable tablet 24 g PRN    glucose chewable tablet 24 g PRN    hydrALAZINE injection 10 mg Q6H PRN    hydrALAZINE tablet 100 mg TID    insulin aspart U-100 pen 0-10 Units QID (AC + HS) PRN    insulin detemir U-100 (Levemir) pen 14 Units Daily    iron sucrose injection 200 mg Daily    isosorbide mononitrate 24 hr tablet 60 mg Daily    levoFLOXacin 750 mg/150 mL IVPB 750 mg Q48H    mupirocin 2 %  ointment BID    naloxone 0.4 mg/mL injection 0.02 mg PRN    sodium bicarbonate tablet 650 mg TID    sodium chloride 0.9% flush 10 mL Q12H PRN    tamsulosin 24 hr capsule 0.4 mg Daily    traZODone tablet 100 mg Nightly PRN       Objective:     Vital Signs (Most Recent):  Temp: 98.8 °F (37.1 °C) (01/14/24 1636)  Pulse: 80 (01/14/24 1636)  Resp: 19 (01/14/24 1636)  BP: 137/62 (01/14/24 1636)  SpO2: (!) 93 % (01/14/24 1729) Vital Signs (24h Range):  Temp:  [98.1 °F (36.7 °C)-99.8 °F (37.7 °C)] 98.8 °F (37.1 °C)  Pulse:  [75-88] 80  Resp:  [17-20] 19  SpO2:  [90 %-98 %] 93 %  BP: (128-162)/(58-74) 137/62     Weight: 125.3 kg (276 lb 3.8 oz) (01/13/24 0402)  Body mass index is 36.44 kg/m².  Body surface area is 2.54 meters squared.    I/O last 3 completed shifts:  In: 931.3 [P.O.:220; Blood:711.3]  Out: 3791 [Urine:3791]     Physical Exam  Vitals and nursing note reviewed.   Constitutional:       Appearance: Normal appearance.   Cardiovascular:      Rate and Rhythm: Normal rate and regular rhythm.      Pulses: Normal pulses.      Heart sounds: Normal heart sounds.   Pulmonary:      Effort: Pulmonary effort is normal.      Breath sounds: Rales present.   Abdominal:      Palpations: Abdomen is soft.   Musculoskeletal:      Right lower leg: Edema present.      Left lower leg: Edema present.   Neurological:      Mental Status: He is alert and oriented to person, place, and time.   Psychiatric:         Behavior: Behavior normal.          Significant Labs: reviewed  BMP  Lab Results   Component Value Date     01/14/2024    K 3.6 01/14/2024     01/14/2024    CO2 15 (L) 01/14/2024    BUN 96 (H) 01/14/2024    CREATININE 4.8 (H) 01/14/2024    CALCIUM 7.3 (L) 01/14/2024    ANIONGAP 17 (H) 01/14/2024    EGFRNORACEVR 14 (A) 01/14/2024     Lab Results   Component Value Date    WBC 12.93 (H) 01/14/2024    HGB 8.2 (L) 01/14/2024    HCT 24.6 (L) 01/14/2024    MCV 88 01/14/2024     01/14/2024     Lab Results   Component  Value Date    IRON 13 (L) 01/12/2024    TRANSFERRIN 123 (L) 01/12/2024    TIBC 182 (L) 01/12/2024    FESATURATED 7 (L) 01/12/2024        Significant Imaging: CXR and CT scan both with bilateral infiltrates  Assessment/Plan:     54 y/o male with CKD stage 5 presented with volume overload:     Stage 5 chronic kidney disease due to type 2 diabetes mellitus  Cr stable and unchanged this admit  Stable renal function, CKD stage 5  CKD likely due to DM and HTN     Complications of CKD:  K normal  Mild hyponatremia, improved  Metabolic acidosis, worse  Mild hypocalcemia, stable  PO4 not high  iPTH pending  Anemia, on epogen. Low iron sat, on venofer IV loading dose     Volume overload. Pulmonary edema:  Pt had responded well to IV diuretics for fluid gain, switched bumex from IV to po  Worse now  Will switch back to IV  Will add metolazone 5 mg po qd  Hemodynamically stable      CHF (congestive heart failure)  Reviewed the chart. See above for diuretics mgmt  Pulmonary edema  Good response to diuretics  Will monitor     Essential hypertension  BP much improved after adding clonidine  Appears too sleepy, may eb from clonidine  Will stop clonidine, as BP has much improved and diuretics were being increased  Meds reviewed     Type 2 diabetes mellitus  Long standing  Reviewed the chart     Plans and recommendations:  As discussed above  Total time spent 40 minutes including time needed to review the records, the   patient evaluation, documentation, face-to-face discussion with the patient,   more than 50% of the time was spent on coordination of care and counseling        Thank you for your consult.     Laya Bartlett MD  Nephrology  [facility] ([facility])

## 2024-01-22 ENCOUNTER — TELEPHONE (OUTPATIENT)
Dept: FAMILY MEDICINE | Facility: CLINIC | Age: 64
End: 2024-01-22

## 2024-01-29 ENCOUNTER — OFFICE VISIT (OUTPATIENT)
Dept: FAMILY MEDICINE | Facility: CLINIC | Age: 64
End: 2024-01-29
Payer: COMMERCIAL

## 2024-01-29 ENCOUNTER — LAB (OUTPATIENT)
Dept: LAB | Facility: CLINIC | Age: 64
End: 2024-01-29
Payer: COMMERCIAL

## 2024-01-29 VITALS
RESPIRATION RATE: 16 BRPM | TEMPERATURE: 97.3 F | BODY MASS INDEX: 22.02 KG/M2 | OXYGEN SATURATION: 98 % | DIASTOLIC BLOOD PRESSURE: 81 MMHG | HEART RATE: 72 BPM | SYSTOLIC BLOOD PRESSURE: 160 MMHG | WEIGHT: 132.3 LBS

## 2024-01-29 DIAGNOSIS — E10.42 TYPE 1 DIABETES MELLITUS WITH DIABETIC POLYNEUROPATHY (H): Primary | ICD-10-CM

## 2024-01-29 DIAGNOSIS — Z87.440 HX: UTI (URINARY TRACT INFECTION): ICD-10-CM

## 2024-01-29 DIAGNOSIS — E55.9 VITAMIN D DEFICIENCY: ICD-10-CM

## 2024-01-29 DIAGNOSIS — E03.9 ACQUIRED HYPOTHYROIDISM: ICD-10-CM

## 2024-01-29 DIAGNOSIS — B37.31 YEAST INFECTION OF THE VAGINA: ICD-10-CM

## 2024-01-29 DIAGNOSIS — E10.42 TYPE 1 DIABETES MELLITUS WITH DIABETIC POLYNEUROPATHY (H): ICD-10-CM

## 2024-01-29 DIAGNOSIS — I10 ESSENTIAL HYPERTENSION: ICD-10-CM

## 2024-01-29 DIAGNOSIS — N18.32 STAGE 3B CHRONIC KIDNEY DISEASE (H): ICD-10-CM

## 2024-01-29 DIAGNOSIS — E10.59 TYPE 1 DIABETES MELLITUS WITH OTHER CIRCULATORY COMPLICATION (H): ICD-10-CM

## 2024-01-29 DIAGNOSIS — Z12.31 VISIT FOR SCREENING MAMMOGRAM: ICD-10-CM

## 2024-01-29 DIAGNOSIS — Z23 ENCOUNTER FOR IMMUNIZATION: ICD-10-CM

## 2024-01-29 DIAGNOSIS — F01.53 VASCULAR DEMENTIA WITH DEPRESSED MOOD (H): ICD-10-CM

## 2024-01-29 LAB
ALBUMIN UR-MCNC: 10 MG/DL
ANION GAP SERPL CALCULATED.3IONS-SCNC: 8 MMOL/L (ref 7–15)
APPEARANCE UR: ABNORMAL
BACTERIA #/AREA URNS HPF: ABNORMAL /HPF
BILIRUB UR QL STRIP: NEGATIVE
BUN SERPL-MCNC: 31.4 MG/DL (ref 8–23)
CALCIUM SERPL-MCNC: 9 MG/DL (ref 8.8–10.2)
CHLORIDE SERPL-SCNC: 106 MMOL/L (ref 98–107)
COLOR UR AUTO: YELLOW
CREAT SERPL-MCNC: 1.64 MG/DL (ref 0.51–0.95)
DEPRECATED HCO3 PLAS-SCNC: 25 MMOL/L (ref 22–29)
EGFRCR SERPLBLD CKD-EPI 2021: 34 ML/MIN/1.73M2
GLUCOSE SERPL-MCNC: 250 MG/DL (ref 70–99)
GLUCOSE UR STRIP-MCNC: NEGATIVE MG/DL
HBA1C MFR BLD: 10.7 %
HGB UR QL STRIP: ABNORMAL
KETONES UR STRIP-MCNC: NEGATIVE MG/DL
LEUKOCYTE ESTERASE UR QL STRIP: ABNORMAL
NITRATE UR QL: NEGATIVE
PH UR STRIP: 5.5 [PH] (ref 5–7)
POTASSIUM SERPL-SCNC: 4.7 MMOL/L (ref 3.4–5.3)
RBC URINE: 61 /HPF
SODIUM SERPL-SCNC: 139 MMOL/L (ref 135–145)
SP GR UR STRIP: 1.02 (ref 1–1.03)
SQUAMOUS EPITHELIAL: 26 /HPF
UROBILINOGEN UR STRIP-MCNC: NORMAL MG/DL
WBC CLUMPS #/AREA URNS HPF: PRESENT /HPF
WBC URINE: >182 /HPF
YEAST #/AREA URNS HPF: ABNORMAL /HPF

## 2024-01-29 PROCEDURE — 87086 URINE CULTURE/COLONY COUNT: CPT | Performed by: FAMILY MEDICINE

## 2024-01-29 PROCEDURE — 99000 SPECIMEN HANDLING OFFICE-LAB: CPT | Performed by: PATHOLOGY

## 2024-01-29 PROCEDURE — 36415 COLL VENOUS BLD VENIPUNCTURE: CPT | Performed by: PATHOLOGY

## 2024-01-29 PROCEDURE — 81001 URINALYSIS AUTO W/SCOPE: CPT | Performed by: PATHOLOGY

## 2024-01-29 PROCEDURE — 90677 PCV20 VACCINE IM: CPT | Performed by: FAMILY MEDICINE

## 2024-01-29 PROCEDURE — G0009 ADMIN PNEUMOCOCCAL VACCINE: HCPCS | Performed by: FAMILY MEDICINE

## 2024-01-29 PROCEDURE — 99214 OFFICE O/P EST MOD 30 MIN: CPT | Mod: 25 | Performed by: FAMILY MEDICINE

## 2024-01-29 PROCEDURE — 80048 BASIC METABOLIC PNL TOTAL CA: CPT | Performed by: PATHOLOGY

## 2024-01-29 PROCEDURE — 82043 UR ALBUMIN QUANTITATIVE: CPT | Performed by: FAMILY MEDICINE

## 2024-01-29 PROCEDURE — 83036 HEMOGLOBIN GLYCOSYLATED A1C: CPT | Performed by: FAMILY MEDICINE

## 2024-01-29 RX ORDER — RESPIRATORY SYNCYTIAL VIRUS VACCINE 120MCG/0.5
0.5 KIT INTRAMUSCULAR ONCE
Qty: 1 EACH | Refills: 0 | Status: CANCELLED | OUTPATIENT
Start: 2024-01-29 | End: 2024-01-29

## 2024-01-29 RX ORDER — LEVOTHYROXINE SODIUM 75 MCG
75 TABLET ORAL DAILY
Qty: 90 TABLET | Refills: 3 | Status: SHIPPED | OUTPATIENT
Start: 2024-01-29 | End: 2024-10-07

## 2024-01-29 RX ORDER — CARVEDILOL 12.5 MG/1
12.5 TABLET ORAL 2 TIMES DAILY WITH MEALS
Qty: 180 TABLET | Refills: 3 | Status: SHIPPED | OUTPATIENT
Start: 2024-01-29 | End: 2024-10-07

## 2024-01-29 RX ORDER — GABAPENTIN 100 MG/1
100 CAPSULE ORAL AT BEDTIME
Qty: 90 CAPSULE | Refills: 1 | Status: SHIPPED | OUTPATIENT
Start: 2024-01-29 | End: 2024-05-20

## 2024-01-29 RX ORDER — INSULIN LISPRO 100 [IU]/ML
INJECTION, SOLUTION INTRAVENOUS; SUBCUTANEOUS
Qty: 15 ML | Refills: 11 | Status: SHIPPED | OUTPATIENT
Start: 2024-01-29 | End: 2024-05-20

## 2024-01-29 NOTE — PROGRESS NOTES
Isabell is a 63 year old that presents in clinic today for the following:     Chief Complaint   Patient presents with    Follow Up     Pt here for follow up    UTI     Pt wants to discuss ongoing UTI concerns           1/29/2024     4:01 PM   Additional Questions   Roomed by MERCEDES, EMT   Accompanied by Kirsty Courtneys from encounters over the past 10 days    No data recorded       Carlos Tirado at 4:04 PM on 1/29/2024

## 2024-01-29 NOTE — PATIENT INSTRUCTIONS
TDAP through local pharmacy   RSV through local pharmacy   Shingrix vaccine two shot series through local pharmacy    Your urine culture did not show a true infection but diabetes needs better control.  You have a yeast infection and diflucan oral tablet was sent to the pharmacy for one dose by month may repeat in one week if vaginal or vulvar rash and itching

## 2024-01-29 NOTE — PROGRESS NOTES
Assessment & Plan     Type 1 diabetes mellitus with diabetic polyneuropathy (H)  Type 1 diabetes mellitus with other circulatory complication (H)  History of type 1 diabetes recently has been visiting her daughter out of town therefore uncertain of current control of blood glucose A1c came back after visit slightly elevated.  Her daughter bring in helps with her diabetes management and understands dosing very well refills were provided Lantus 18 units, short acting insulin approximately 2 to 4 units prior to meals and sliding scale.    - HEMOGLOBIN A1C  - Basic metabolic panel  (Ca, Cl, CO2, Creat, Gluc, K, Na, BUN)  - gabapentin (NEURONTIN) 100 MG capsule  Dispense: 90 capsule; Refill: 1  - insulin lispro (HUMALOG KWIKPEN) 100 UNIT/ML (1 unit dial) KWIKPEN  Dispense: 15 mL; Refill: 11    Vascular dementia with depressed mood (H)  Stable at this time duloxetine 20 mg daily    Acquired hypothyroidism  Thyroid function checked recently continue current dose  - SYNTHROID 75 MCG tablet  Dispense: 90 tablet; Refill: 3    Essential hypertension  Requested refill for carvedilol 12.5 mg twice daily although still on file at her pharmacy daughter not  able to  therefore she ran out of medication thus her blood pressure was slightly elevated today.  Continuing on amlodipine 5 mg  - carvedilol (COREG) 12.5 MG tablet  Dispense: 180 tablet; Refill: 3    Hx: UTI (urinary tract infection)  Her daughter notes cloudy urine but Isabell does not complaining of any symptoms urinalysis came back likely normal isaias not true urinary tract infection continue to monitor for signs of urinary tract infection, hydrate well try to get diabetes under better control.  - UA with Microscopic reflex to Culture - lab collect    Yeast infection of the vagina  Yeast present in urine sample likely vulvovaginitis will send Diflucan to her pharmacy    Visit for screening mammogram  Reviewed health maintenance she agrees to mammogram  - MA Screen  Bilateral w/Josias    Encounter for immunization  Reviewed immunizations will provide pneumococcal vaccine today and she should consider 2 shot Shingrix series, RSV, Tdap through local pharmacy  - PNEUMOCOCCAL 20 VALENT CONJUGATE (PREVNAR 20)    37 minutes spent on the date of the encounter doing chart review, history, exam, diagnostics review, documentation, counseling and coordination of cares as noted.                 Return in about 3 months (around 4/29/2024).    Subjective   Isabell is a 63 year old, presenting for the following health issues:  Follow Up (Pt here for follow up) and UTI (Pt wants to discuss ongoing UTI concerns)        1/29/2024     4:01 PM   Additional Questions   Roomed by MERCEDES, EMT   Accompanied by Daughter - Ángela Whyte is a 63 year old female with an extensive past medical history including hypertension, hyperlipidemia, diabetes mellitus insulin requiring, CKD stage 3b, coronary artery disease (s/p PCI 2004), hypothyroidism, degenerative arthritis, HX UTI, and prior history of ischemic stroke & Vascular dementia, seizure disorder,ED for status epilepticus and encephalopathy 12/10/22 hospitalized through 12/14/22, hospitalized 6/16 and 9/4/2023  sent home with her Daughter Adrienne personal caregiver.   Kirsty Lerma feels able to continue as mother's PCA, Lisa wants to continue to live with her daughter.   Possible urinary tract infection  Lisa was visiting her daughter Anam out of town and returned with slight confusion and abdominal discomfort, her daughter feels she might have a urinary tract infection therefore would like urinary sample evaluation.  No recent fever.  Lisa does not complain of dysuria but her daughter sometimes is able to tell when she has urinary tract infection as the urine was cloudy and slightly more disoriented.  Seizure  Taking Keppra 500 mg twice daily.  Diabetes hyperlipidemia  Lisa had endocrine consult while she was in the hospital [facility] bowles  with short acting insulin and Lantus doses.  Taking atorvastatin 40 mg daily.  Hypertension  Amlodipine 5 mg,  carvedilol 12.5 mg twice daily ran out of carvedilol last evening I reviewed refills are available through her pharmacy, last visit cardura at bedtime stopped.  Mood and pain  She remains on Cymbalta 20 mg daily.  She has done better off of Lyrica is more alert.  Not utilizing melatonin prior to bed.  Healthcare maintenance reviewed   Declines routine screening due to chronic health concerns except will have mammogram  Immunizations reviewed agrees to pneumococcal vaccine in clinic and will consider Tdap and RSV through local pharmacy.       Labs reviewed in EPIC  BP Readings from Last 3 Encounters:   01/29/24 (!) 160/81   10/16/23 (!) 142/80   09/14/23 106/60    Wt Readings from Last 3 Encounters:   01/29/24 60 kg (132 lb 4.8 oz)   09/04/23 59.7 kg (131 lb 9.8 oz)   06/15/23 59 kg (130 lb 1.1 oz)            Immunization History   Administered Date(s) Administered    COVID-19 12+ (2023-24) (Pfizer) 10/16/2023    COVID-19 Bivalent 12+ (Pfizer) 10/31/2022    COVID-19 MONOVALENT 12+ (Pfizer) 03/17/2021, 04/06/2021, 10/25/2021    Influenza Vaccine 65+ (Fluzone HD) 11/02/2020, 10/31/2022, 10/16/2023    Influenza Vaccine >6 months,quad, PF 11/02/2020, 11/02/2020, 10/25/2021    Mantoux Tuberculin Skin Test 06/28/2021, 07/12/2021    Pneumococcal 20 valent Conjugate (Prevnar 20) 01/29/2024            Patient Active Problem List   Diagnosis    Acquired hypothyroidism    Seizures (H)    Hyperlipidemia LDL goal <100    DKA (diabetic ketoacidoses)    Nephrolithiasis    Left renal mass    Coronary atherosclerosis    Dehydration    Ischemic vascular dementia (H)    Other osteoarthritis of spine, unspecified spinal region    Vitamin D deficiency    Vitamin B12 deficiency (non anemic)    Seizures (H)    Nausea & vomiting    Nail deformity    Mixed stress and urge urinary incontinence    Luetscher's syndrome    Left-sided  nontraumatic intracerebral hemorrhage, unspecified cerebral location (H)    Vascular dementia without behavioral disturbance (H)    Fungal dermatitis    Type 1 diabetes mellitus with diabetic polyneuropathy (H)    Chest pressure    Essential hypertension    Dysphagia    Tobacco use    Dysthymia    History of diabetes with ketoacidosis    Stage 3b chronic kidney disease (H)    Full incontinence of feces    Acute CVA (cerebrovascular accident) (H)    Altered mental status, unspecified altered mental status type    Hyperglycemia    Infection due to 2019 novel coronavirus    Hx: UTI (urinary tract infection)    Fibromyalgia    E. 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)    Acute cystitis without hematuria    Cerebrovascular accident (CVA), unspecified mechanism (H)    Severe dementia without behavioral disturbance, psychotic disturbance, mood disturbance, or anxiety, unspecified dementia type (H)    Diabetic ketoacidosis without coma associated with type 1 diabetes mellitus (H)    Cognitive decline    Nausea and vomiting, unspecified vomiting type    Closed fracture of multiple ribs of right side, initial encounter     Past Surgical History:   Procedure Laterality Date    athroplasty hip Bilateral     ,     OTHER SURGICAL HISTORY      athroplasty hip    PICC DOUBLE LUMEN PLACEMENT Right 2023    right basilic 5 fr dl power picc 39 cm    STENT         Social History     Tobacco Use    Smoking status: Former     Packs/day: 0.50     Years: 35.00     Additional pack years: 0.00     Total pack years: 17.50     Types: Cigarettes     Quit date: 2018     Years since quittin.5    Smokeless tobacco: Never   Substance Use Topics    Alcohol use: Not Currently     Family History   Problem Relation Age of Onset    Acute Myocardial Infarction Father      Heart Disease Maternal Grandmother     Dementia Maternal Grandmother     Myocardial Infarction Father     Dementia Paternal Grandmother     Heart Disease Paternal Grandmother          Current Outpatient Medications   Medication Sig Dispense Refill    acetaminophen (TYLENOL) 325 MG tablet Take 3 tablets (975 mg) by mouth every 8 hours      amLODIPine (NORVASC) 5 MG tablet Take 1 tablet (5 mg) by mouth daily 90 tablet 3    aspirin (ASA) 81 MG chewable tablet Take 1 tablet (81 mg) by mouth daily 90 tablet 3    atorvastatin (LIPITOR) 40 MG tablet Take 1 tablet (40 mg) by mouth daily 90 tablet 3    blood glucose (TRUE METRIX BLOOD GLUCOSE TEST) test strip USE TO TEST BLOOD SUGAR 4 TO 5 TIMES DAILY OR AS DIRECTED 400 strip 3    blood glucose monitoring (NO BRAND SPECIFIED) meter device kit Use to test blood sugar 4 times daily.  Please provide glucose meter that is covered by insurance. 1 kit 0    carvedilol (COREG) 12.5 MG tablet Take 1 tablet (12.5 mg) by mouth 2 times daily (with meals) 180 tablet 3    Continuous Blood Gluc Sensor (FREESTYLE MARIA FERNANDA 2 SENSOR) [facility] 1 each every 14 days Use 1 sensor every 14 days. Use to read blood sugars per 's instructions. 2 each 5    cyanocobalamin (VITAMIN B-12) 1000 MCG tablet Take 1 tablet (1,000 mcg) by mouth daily 100 tablet 3    cyclobenzaprine (FLEXERIL) 10 MG tablet Take 1 tablet (10 mg) by mouth at bedtime 30 tablet 0    diclofenac (VOLTAREN) 1 % topical gel Apply 2 g topically 4 times daily as needed for moderate pain 150 g 1    DULoxetine (CYMBALTA) 20 MG capsule Take 1 capsule (20 mg) by mouth daily 90 capsule 3    gabapentin (NEURONTIN) 100 MG capsule Take 1 capsule (100 mg) by mouth at bedtime 90 capsule 1    insulin glargine (LANTUS PEN) 100 UNIT/ML pen Inject 18 Units Subcutaneous every 24 hours 30 mL 3    insulin lispro (HUMALOG KWIKPEN) 100 UNIT/ML (1 unit dial) KWIKPEN LOW INSULIN RESISTANCE DOSING Do Not give Bedtime Correction Insulin if BG less than  225. For - 299 give 2 unit. For  - 399 give 4 units For BG greater than or equal 400 give 5 units.  Max dose is 11 units per day 15 mL 11    insulin pen needle (31G X 8 MM) 31G X 8 MM miscellaneous Use 4 pen needles daily or as directed. 300 each 4    levETIRAcetam (KEPPRA) 500 MG tablet Take 1 tablet (500 mg) by mouth 2 times daily 180 tablet 3    Lidocaine (LIDOCARE) 4 % Patch Place 1 patch onto the skin every 24 hours To prevent lidocaine toxicity, patient should be patch free for 12 hrs daily. 15 patch 0    loratadine (CLARITIN) 10 MG tablet Take 1 tablet (10 mg) by mouth daily 90 tablet 3    METHYLCELLULOSE, LAXATIVE, PO Take 1 Tablespoonful by mouth daily as needed      nystatin (MYCOSTATIN) 645112 UNIT/GM external cream Apply topically 2 times daily Until rash clear 30 g 3    prochlorperazine (COMPAZINE) 10 MG tablet Take 1 tablet (10 mg) by mouth every 6 hours as needed for nausea or vomiting 30 tablet 0    senna-docusate (SENOKOT-S/PERICOLACE) 8.6-50 MG tablet Take 1 tablet by mouth 2 times daily 12 tablet 0    SYNTHROID 75 MCG tablet Take 1 tablet (75 mcg) by mouth daily 90 tablet 3    Vitamin D3 (CHOLECALCIFEROL) 125 MCG (5000 UT) tablet Take 1 tablet (125 mcg) by mouth daily 90 tablet 3    zinc oxide (DESITIN) 20 % external ointment Apply topically as needed for dry skin or irritation 85 g 3     Allergies   Allergen Reactions    Seasonal Allergies      Recent Labs   Lab Test 01/29/24  1655 10/16/23  1750 09/06/23  0609 09/05/23  0645 09/04/23  0228 06/18/23  0745 06/17/23  0655 05/21/23  0627 05/20/23  1312 03/29/23  1158 04/06/22  0407 04/06/22  0014 08/02/21  1009 07/07/21  0630 06/27/21  0759   A1C 10.7*  --   --   --  9.3*  --   --   --  10.8* 11.2*   < >  --    < >  --   --    LDL  --   --   --   --   --   --   --   --  71 72  --  39   < >  --   --    HDL  --   --   --   --   --   --   --   --  35* 48*  --  34*   < >  --   --    TRIG  --   --   --   --   --   --   --   --  152* 105  --   129   < >  --   --    ALT  --  24  --   --   --  24 23   < > 17 39*   < >  --    < >  --   --    CR 1.64* 1.66*   < > 1.56* 1.64* 1.25* 1.33*   < > 1.71*  1.71* 1.62*   < > 1.20*   < > 1.68* 1.48*   GFRESTIMATED 34* 34*   < > 36* 34* 48* 44*   < > 33*  33* 35*   < > 51*   < > 31* 37*   GFRESTBLACK  --   --   --   --   --   --   --   --   --   --   --   --   --  37* 43*   POTASSIUM 4.7 4.3   < > 4.0 4.3 4.2 4.3   < > 4.3 4.1   < >  --    < > 3.9 3.5   TSH  --  0.59  --  2.86  --   --   --    < >  --  2.38   < >  --    < >  --   --     < > = values in this interval not displayed.       Review of Systems  Problem list, PMH, Surgical HX, FH, SH, allergies, medications,immunizations reviewed and updated in Epic.  ROS negative other than noted in HPI and ROS.       Objective    BP (!) 160/81 (BP Location: Right arm, Patient Position: Sitting, Cuff Size: Adult Regular)   Pulse 72   Temp 97.3  F (36.3  C)   Resp 16   Wt 60 kg (132 lb 4.8 oz)   SpO2 98%   BMI 22.02 kg/m    Body mass index is 22.02 kg/m .  Physical Exam   Constitutional: Sitting in a wheelchair, quiet allows her daughter to do the communication but responds to questioning.  Appears stable at her baseline chronic poor health, well-groomed, cooperative.  HENT: TM normal.  Right pinna pedunculated growth has been present for years without change.  Head: Normocephalic and atraumatic.   Mouth/Throat: hydrated,  dental decay.  Eyes: Conjunctivae and EOM are normal.No scleral icterus.   Neck:  Neck supple.  No tracheal deviation present. No thyromegaly present.   Cardiovascular: Regular rhythm, normal heart sounds, No murmur present.   Pulmonary/Chest: Effort normal and breath sounds normal. No respiratory distress.   Abdominal: Exam in chair soft obese. Bowel sounds are normal. No distension and no mass. No tenderness.   Musculoskeletal: Deconditioned upper and lower extremities.   No edema and no tenderness.  Neurological: Cooperative.  Signs of vascular  dementia. Able to move bilateral upper and lower extremities   Skin: No ulcerations or bruising noted on exposed skin   Psychiatric: Cooperative, communicative through nods and a few words    Results for orders placed or performed in visit on 01/29/24   Basic metabolic panel  (Ca, Cl, CO2, Creat, Gluc, K, Na, BUN)     Status: Abnormal   Result Value Ref Range    Sodium 139 135 - 145 mmol/L    Potassium 4.7 3.4 - 5.3 mmol/L    Chloride 106 98 - 107 mmol/L    Carbon Dioxide (CO2) 25 22 - 29 mmol/L    Anion Gap 8 7 - 15 mmol/L    Urea Nitrogen 31.4 (H) 8.0 - 23.0 mg/dL    Creatinine 1.64 (H) 0.51 - 0.95 mg/dL    GFR Estimate 34 (L) >60 mL/min/1.73m2    Calcium 9.0 8.8 - 10.2 mg/dL    Glucose 250 (H) 70 - 99 mg/dL   UA with Microscopic reflex to Culture - lab collect     Status: Abnormal    Specimen: Urine, NOS   Result Value Ref Range    Color Urine Yellow Colorless, Straw, Light Yellow, Yellow    Appearance Urine Cloudy (A) Clear    Glucose Urine Negative Negative mg/dL    Bilirubin Urine Negative Negative    Ketones Urine Negative Negative mg/dL    Specific Gravity Urine 1.019 1.003 - 1.035    Blood Urine Moderate (A) Negative    pH Urine 5.5 5.0 - 7.0    Protein Albumin Urine 10 (A) Negative mg/dL    Urobilinogen Urine Normal Normal, 2.0 mg/dL    Nitrite Urine Negative Negative    Leukocyte Esterase Urine Large (A) Negative    Bacteria Urine Many (A) None Seen /HPF    WBC Clumps Urine Present (A) None Seen /HPF    Budding Yeast Urine Many (A) None Seen /HPF    RBC Urine 61 (H) <=2 /HPF    WBC Urine >182 (H) <=5 /HPF    Squamous Epithelials Urine 26 (H) <=1 /HPF    Narrative    Urine Culture ordered based on laboratory criteria   HEMOGLOBIN A1C     Status: Abnormal   Result Value Ref Range    Hemoglobin A1C 10.7 (H) <5.7 %   Albumin Random Urine Quantitative with Creat Ratio     Status: Abnormal   Result Value Ref Range    Creatinine Urine mg/dL 114.0 mg/dL    Albumin Urine mg/L 252.0 mg/L    Albumin Urine mg/g Cr  221.05 (H) 0.00 - 25.00 mg/g Cr   Urine Culture     Status: Abnormal    Specimen: Urine, NOS   Result Value Ref Range    Culture >100,000 CFU/mL Gram negative bacilli (A)     Culture 10,000-50,000 CFU/mL Mixture of urogenital isaias           Signed Electronically by: Bony Castaneda MD

## 2024-01-30 LAB
CREAT UR-MCNC: 114 MG/DL
MICROALBUMIN UR-MCNC: 252 MG/L
MICROALBUMIN/CREAT UR: 221.05 MG/G CR (ref 0–25)

## 2024-01-31 ENCOUNTER — TELEPHONE (OUTPATIENT)
Dept: FAMILY MEDICINE | Facility: CLINIC | Age: 64
End: 2024-01-31
Payer: COMMERCIAL

## 2024-01-31 LAB
BACTERIA UR CULT: ABNORMAL
BACTERIA UR CULT: ABNORMAL

## 2024-01-31 RX ORDER — FLUCONAZOLE 150 MG/1
150 TABLET ORAL ONCE
Qty: 1 TABLET | Refills: 1 | Status: SHIPPED | OUTPATIENT
Start: 2024-01-31 | End: 2024-01-31

## 2024-02-01 NOTE — RESULT ENCOUNTER NOTE
I tried to call with results, I was not able to reach you.  Your urine culture did not show a true infection ( possibly not a clean catch urine) diabetes needs better control as A1C was 10.7.  You have a yeast infection and diflucan oral tablet was sent to the pharmacy for one dose by month may repeat in one week if vaginal or vulvar rash /itching. Hydrate well as your kidney function is slightly impaired an make sure you are taking your blood pressure medications.  Please schedule an earlier follow-up if glucose readings are still over 200.  Bony Castaneda MD

## 2024-02-05 ENCOUNTER — TELEPHONE (OUTPATIENT)
Dept: FAMILY MEDICINE | Facility: CLINIC | Age: 64
End: 2024-02-05
Payer: COMMERCIAL

## 2024-03-07 ENCOUNTER — APPOINTMENT (OUTPATIENT)
Dept: CT IMAGING | Facility: CLINIC | Age: 64
End: 2024-03-07
Attending: EMERGENCY MEDICINE
Payer: COMMERCIAL

## 2024-03-07 ENCOUNTER — APPOINTMENT (OUTPATIENT)
Dept: MRI IMAGING | Facility: CLINIC | Age: 64
End: 2024-03-07
Attending: EMERGENCY MEDICINE
Payer: COMMERCIAL

## 2024-03-07 ENCOUNTER — HOSPITAL ENCOUNTER (EMERGENCY)
Facility: CLINIC | Age: 64
Discharge: HOME | End: 2024-03-07
Attending: EMERGENCY MEDICINE | Admitting: EMERGENCY MEDICINE
Payer: COMMERCIAL

## 2024-03-07 VITALS
DIASTOLIC BLOOD PRESSURE: 77 MMHG | SYSTOLIC BLOOD PRESSURE: 156 MMHG | WEIGHT: 135 LBS | HEART RATE: 92 BPM | TEMPERATURE: 97.7 F | BODY MASS INDEX: 23.92 KG/M2 | OXYGEN SATURATION: 97 % | RESPIRATION RATE: 16 BRPM | HEIGHT: 63 IN

## 2024-03-07 DIAGNOSIS — E16.2 HYPOGLYCEMIA: ICD-10-CM

## 2024-03-07 DIAGNOSIS — R41.82 ALTERED MENTAL STATUS, UNSPECIFIED ALTERED MENTAL STATUS TYPE: ICD-10-CM

## 2024-03-07 DIAGNOSIS — R56.9 SEIZURE (H): ICD-10-CM

## 2024-03-07 LAB
ANION GAP SERPL CALCULATED.3IONS-SCNC: 8 MMOL/L (ref 7–15)
APTT PPP: 31 SECONDS (ref 22–38)
ATRIAL RATE - MUSE: 79 BPM
BASOPHILS # BLD AUTO: 0 10E3/UL (ref 0–0.2)
BASOPHILS NFR BLD AUTO: 1 %
BUN SERPL-MCNC: 32.5 MG/DL (ref 8–23)
CA-I BLD-MCNC: 5.5 MG/DL (ref 4.4–5.2)
CALCIUM SERPL-MCNC: 8.8 MG/DL (ref 8.8–10.2)
CHLORIDE SERPL-SCNC: 102 MMOL/L (ref 98–107)
CPB POCT: NO
CREAT BLD-MCNC: 1.6 MG/DL (ref 0.5–1)
CREAT SERPL-MCNC: 1.5 MG/DL (ref 0.51–0.95)
DEPRECATED HCO3 PLAS-SCNC: 26 MMOL/L (ref 22–29)
DIASTOLIC BLOOD PRESSURE - MUSE: NORMAL MMHG
EGFRCR SERPLBLD CKD-EPI 2021: 35 ML/MIN/1.73M2
EGFRCR SERPLBLD CKD-EPI 2021: 38 ML/MIN/1.73M2
EOSINOPHIL # BLD AUTO: 0.1 10E3/UL (ref 0–0.7)
EOSINOPHIL NFR BLD AUTO: 2 %
ERYTHROCYTE [DISTWIDTH] IN BLOOD BY AUTOMATED COUNT: 13.3 % (ref 10–15)
GLUCOSE BLD-MCNC: 210 MG/DL (ref 70–99)
GLUCOSE BLDC GLUCOMTR-MCNC: 206 MG/DL (ref 70–99)
GLUCOSE BLDC GLUCOMTR-MCNC: 223 MG/DL (ref 70–99)
GLUCOSE BLDC GLUCOMTR-MCNC: 226 MG/DL (ref 70–99)
GLUCOSE BLDC GLUCOMTR-MCNC: 337 MG/DL (ref 70–99)
GLUCOSE BLDC GLUCOMTR-MCNC: 396 MG/DL (ref 70–99)
GLUCOSE BLDC GLUCOMTR-MCNC: 400 MG/DL (ref 70–99)
GLUCOSE BLDC GLUCOMTR-MCNC: 86 MG/DL (ref 70–99)
GLUCOSE BLDC GLUCOMTR-MCNC: 86 MG/DL (ref 70–99)
GLUCOSE SERPL-MCNC: 224 MG/DL (ref 70–99)
HCO3 BLDV-SCNC: 25 MMOL/L (ref 21–28)
HCO3 BLDV-SCNC: 26 MMOL/L (ref 21–28)
HCT VFR BLD AUTO: 34 % (ref 35–47)
HCT VFR BLD CALC: 34 % (ref 35–47)
HGB BLD-MCNC: 10.8 G/DL (ref 11.7–15.7)
HGB BLD-MCNC: 11.6 G/DL (ref 11.7–15.7)
HOLD SPECIMEN: NORMAL
IMM GRANULOCYTES # BLD: 0 10E3/UL
IMM GRANULOCYTES NFR BLD: 0 %
INR BLD: 1.1 (ref 2–3)
INR PPP: 0.99 (ref 0.85–1.15)
INTERPRETATION ECG - MUSE: NORMAL
LACTATE BLD-SCNC: 0.6 MMOL/L
LYMPHOCYTES # BLD AUTO: 1.5 10E3/UL (ref 0.8–5.3)
LYMPHOCYTES NFR BLD AUTO: 30 %
MCH RBC QN AUTO: 28.4 PG (ref 26.5–33)
MCHC RBC AUTO-ENTMCNC: 31.8 G/DL (ref 31.5–36.5)
MCV RBC AUTO: 90 FL (ref 78–100)
MONOCYTES # BLD AUTO: 0.4 10E3/UL (ref 0–1.3)
MONOCYTES NFR BLD AUTO: 8 %
NEUTROPHILS # BLD AUTO: 2.9 10E3/UL (ref 1.6–8.3)
NEUTROPHILS NFR BLD AUTO: 59 %
NRBC # BLD AUTO: 0 10E3/UL
NRBC BLD AUTO-RTO: 0 /100
P AXIS - MUSE: 55 DEGREES
PCO2 BLDV: 47 MM HG (ref 40–50)
PCO2 BLDV: 49 MM HG (ref 40–50)
PH BLDV: 7.33 [PH] (ref 7.32–7.43)
PH BLDV: 7.33 [PH] (ref 7.32–7.43)
PLATELET # BLD AUTO: 174 10E3/UL (ref 150–450)
PO2 BLDV: 42 MM HG (ref 25–47)
PO2 BLDV: 49 MM HG (ref 25–47)
POTASSIUM BLD-SCNC: 3.5 MMOL/L (ref 3.4–5.3)
POTASSIUM SERPL-SCNC: 3.6 MMOL/L (ref 3.4–5.3)
PR INTERVAL - MUSE: 162 MS
QRS DURATION - MUSE: 96 MS
QT - MUSE: 428 MS
QTC - MUSE: 490 MS
R AXIS - MUSE: 37 DEGREES
RBC # BLD AUTO: 3.8 10E6/UL (ref 3.8–5.2)
SAO2 % BLDV: 73 % (ref 70–75)
SAO2 % BLDV: 81 % (ref 70–75)
SODIUM BLD-SCNC: 137 MMOL/L (ref 135–145)
SODIUM SERPL-SCNC: 136 MMOL/L (ref 135–145)
SYSTOLIC BLOOD PRESSURE - MUSE: NORMAL MMHG
T AXIS - MUSE: 26 DEGREES
TROPONIN T BLD-MCNC: 0 UG/L
TROPONIN T SERPL HS-MCNC: 26 NG/L
VENTRICULAR RATE- MUSE: 79 BPM
WBC # BLD AUTO: 4.9 10E3/UL (ref 4–11)

## 2024-03-07 PROCEDURE — 99291 CRITICAL CARE FIRST HOUR: CPT | Mod: 25 | Performed by: EMERGENCY MEDICINE

## 2024-03-07 PROCEDURE — 84484 ASSAY OF TROPONIN QUANT: CPT

## 2024-03-07 PROCEDURE — 75572 CT HRT W/3D IMAGE: CPT | Mod: 26 | Performed by: INTERNAL MEDICINE

## 2024-03-07 PROCEDURE — 80048 BASIC METABOLIC PNL TOTAL CA: CPT | Performed by: EMERGENCY MEDICINE

## 2024-03-07 PROCEDURE — 82565 ASSAY OF CREATININE: CPT

## 2024-03-07 PROCEDURE — 999N000176 HC STATISTIC STROKE CODE W/O ACCESS

## 2024-03-07 PROCEDURE — 82962 GLUCOSE BLOOD TEST: CPT

## 2024-03-07 PROCEDURE — 250N000011 HC RX IP 250 OP 636: Performed by: EMERGENCY MEDICINE

## 2024-03-07 PROCEDURE — 75572 CT HRT W/3D IMAGE: CPT

## 2024-03-07 PROCEDURE — 82330 ASSAY OF CALCIUM: CPT

## 2024-03-07 PROCEDURE — 70450 CT HEAD/BRAIN W/O DYE: CPT

## 2024-03-07 PROCEDURE — 85025 COMPLETE CBC W/AUTO DIFF WBC: CPT | Performed by: EMERGENCY MEDICINE

## 2024-03-07 PROCEDURE — 99207 PR SERVICE NOT STAFFED W/SUPERV PROV: CPT | Performed by: INTERNAL MEDICINE

## 2024-03-07 PROCEDURE — 96376 TX/PRO/DX INJ SAME DRUG ADON: CPT | Mod: 59 | Performed by: EMERGENCY MEDICINE

## 2024-03-07 PROCEDURE — 70496 CT ANGIOGRAPHY HEAD: CPT | Mod: 26 | Performed by: RADIOLOGY

## 2024-03-07 PROCEDURE — 93010 ELECTROCARDIOGRAM REPORT: CPT | Performed by: EMERGENCY MEDICINE

## 2024-03-07 PROCEDURE — 84484 ASSAY OF TROPONIN QUANT: CPT | Mod: 91 | Performed by: EMERGENCY MEDICINE

## 2024-03-07 PROCEDURE — 93005 ELECTROCARDIOGRAM TRACING: CPT | Mod: XU | Performed by: EMERGENCY MEDICINE

## 2024-03-07 PROCEDURE — 250N000012 HC RX MED GY IP 250 OP 636 PS 637: Performed by: EMERGENCY MEDICINE

## 2024-03-07 PROCEDURE — 70551 MRI BRAIN STEM W/O DYE: CPT | Mod: 26 | Performed by: RADIOLOGY

## 2024-03-07 PROCEDURE — 85610 PROTHROMBIN TIME: CPT

## 2024-03-07 PROCEDURE — 70551 MRI BRAIN STEM W/O DYE: CPT

## 2024-03-07 PROCEDURE — 85730 THROMBOPLASTIN TIME PARTIAL: CPT | Performed by: EMERGENCY MEDICINE

## 2024-03-07 PROCEDURE — 36415 COLL VENOUS BLD VENIPUNCTURE: CPT | Performed by: EMERGENCY MEDICINE

## 2024-03-07 PROCEDURE — 96375 TX/PRO/DX INJ NEW DRUG ADDON: CPT | Mod: 59 | Performed by: EMERGENCY MEDICINE

## 2024-03-07 PROCEDURE — 258N000001 HC RX 258: Performed by: EMERGENCY MEDICINE

## 2024-03-07 PROCEDURE — 96365 THER/PROPH/DIAG IV INF INIT: CPT | Mod: 59 | Performed by: EMERGENCY MEDICINE

## 2024-03-07 PROCEDURE — 70496 CT ANGIOGRAPHY HEAD: CPT

## 2024-03-07 PROCEDURE — 82803 BLOOD GASES ANY COMBINATION: CPT

## 2024-03-07 PROCEDURE — 70498 CT ANGIOGRAPHY NECK: CPT | Mod: 26 | Performed by: RADIOLOGY

## 2024-03-07 PROCEDURE — 85610 PROTHROMBIN TIME: CPT | Mod: 91 | Performed by: EMERGENCY MEDICINE

## 2024-03-07 PROCEDURE — 250N000011 HC RX IP 250 OP 636

## 2024-03-07 RX ORDER — AMLODIPINE BESYLATE 5 MG/1
5 TABLET ORAL DAILY
COMMUNITY
End: 2024-05-20

## 2024-03-07 RX ORDER — ONDANSETRON 2 MG/ML
INJECTION INTRAMUSCULAR; INTRAVENOUS
Status: COMPLETED
Start: 2024-03-07 | End: 2024-03-07

## 2024-03-07 RX ORDER — IOPAMIDOL 755 MG/ML
67 INJECTION, SOLUTION INTRAVASCULAR ONCE
Status: COMPLETED | OUTPATIENT
Start: 2024-03-07 | End: 2024-03-07

## 2024-03-07 RX ORDER — GABAPENTIN 100 MG/1
100 CAPSULE ORAL 3 TIMES DAILY
COMMUNITY
End: 2024-05-20

## 2024-03-07 RX ORDER — LEVETIRACETAM 500 MG/1
500 TABLET ORAL 2 TIMES DAILY
COMMUNITY
End: 2024-05-20

## 2024-03-07 RX ORDER — CARVEDILOL 6.25 MG/1
2.5 TABLET ORAL 2 TIMES DAILY WITH MEALS
COMMUNITY
End: 2024-05-20

## 2024-03-07 RX ORDER — DULOXETIN HYDROCHLORIDE 20 MG/1
20 CAPSULE, DELAYED RELEASE ORAL 2 TIMES DAILY
COMMUNITY
End: 2024-05-20

## 2024-03-07 RX ORDER — ASPIRIN 81 MG/1
81 TABLET ORAL DAILY
COMMUNITY
End: 2024-05-20

## 2024-03-07 RX ORDER — DEXTROSE MONOHYDRATE 25 G/50ML
50 INJECTION, SOLUTION INTRAVENOUS ONCE
Status: COMPLETED | OUTPATIENT
Start: 2024-03-07 | End: 2024-03-07

## 2024-03-07 RX ORDER — LORATADINE 10 MG/1
10 TABLET ORAL DAILY
COMMUNITY
End: 2024-05-20

## 2024-03-07 RX ORDER — ATORVASTATIN CALCIUM 40 MG/1
40 TABLET, FILM COATED ORAL DAILY
COMMUNITY
End: 2024-05-20

## 2024-03-07 RX ORDER — DEXTROSE MONOHYDRATE 100 MG/ML
INJECTION, SOLUTION INTRAVENOUS CONTINUOUS
Status: DISCONTINUED | OUTPATIENT
Start: 2024-03-07 | End: 2024-03-09 | Stop reason: HOSPADM

## 2024-03-07 RX ORDER — ONDANSETRON 2 MG/ML
4 INJECTION INTRAMUSCULAR; INTRAVENOUS ONCE
Status: COMPLETED | OUTPATIENT
Start: 2024-03-07 | End: 2024-03-07

## 2024-03-07 RX ADMIN — DEXTROSE MONOHYDRATE: 100 INJECTION, SOLUTION INTRAVENOUS at 01:51

## 2024-03-07 RX ADMIN — INSULIN ASPART 4 UNITS: 100 INJECTION, SOLUTION INTRAVENOUS; SUBCUTANEOUS at 11:02

## 2024-03-07 RX ADMIN — DEXTROSE MONOHYDRATE 50 ML: 25 INJECTION, SOLUTION INTRAVENOUS at 02:48

## 2024-03-07 RX ADMIN — ONDANSETRON 4 MG: 2 INJECTION INTRAMUSCULAR; INTRAVENOUS at 01:37

## 2024-03-07 RX ADMIN — IOPAMIDOL 67 ML: 755 INJECTION, SOLUTION INTRAVENOUS at 01:31

## 2024-03-07 ASSESSMENT — ACTIVITIES OF DAILY LIVING (ADL)
ADLS_ACUITY_SCORE: 35

## 2024-03-07 NOTE — ED NOTES
Spoke to daughter Tamie Peña on the phone for MRI check list.   Daughter thinks that pt took 20 units regular insulin instead of NPH for night dose.

## 2024-03-07 NOTE — ED PROVIDER NOTES
"    [facility] ([facility])    3/07/24       ED PROVIDER NOTE    History     Chief Complaint   Patient presents with    Hypoglycemia     The history is provided by medical records and the EMS personnel. The history is limited by the condition of the patient.     Cassie Gonzalez is a 63 year old female with PMH notable for DM1 with diabetic polyneuropathy, vascular dementia, seizures on Keppra, acute ischemic stroke (04/2022) on Plavix, HTN, HLD, CKD who presents to the Emergency Department via EMS for evaluation of hypoglycemia. Per EMS, patient's daughter noticed her drooling and slumped on the  at 0040. EMS checked her BG which was low. She was given D10 en route. Her last normal was midnight per EMS; however, her baseline is unclear.    Patient does not respond and follow command during initial examination.     Past Medical History  No past medical history on file.  No past surgical history on file.  amLODIPine (NORVASC) 5 MG tablet  aspirin 81 MG EC tablet  atorvastatin (LIPITOR) 40 MG tablet  carvedilol (COREG) 6.25 MG tablet  DULoxetine (CYMBALTA) 20 MG capsule  gabapentin (NEURONTIN) 100 MG capsule  levETIRAcetam (KEPPRA) 500 MG tablet  loratadine (CLARITIN) 10 MG tablet      Not on File  Family History  No family history on file.  Social History          A complete review of systems was performed with pertinent positives and negatives noted in the HPI, and all other systems negative.    Physical Exam   BP: (!) 171/93  Pulse: 84  Resp: 19  Height: 160 cm (5' 3\")  Weight: 61.2 kg (135 lb)  SpO2: 97 %  Physical Exam  General: Afebrile, no acute distress   HEENT: Normocephalic, atraumatic, conjunctivae normal. MMM  Neck: non-tender, supple  Cardio: regular rate. regular rhythm   Resp: Normal work of breathing, no respiratory distress, lungs clear bilaterally, no wheezing, rhonchi, rales  Chest/Back: no visual signs of trauma, no CVA tenderness   Abdomen: soft, non distension, no " tenderness, no peritoneal signs   Neuro: Initially upon arrival patient with decrease responsiveness and awakens to verbal stimuli, patient unable to follow simple commands.   MSK: no deformities. Normal range of motion  Integumentary/Skin: no rash visualized, normal color  Psych: normal affect, normal behavior      ED Course, Procedures, & Data      Procedures            EKG Interpretation:      Interpreted by Estela Hayes MD  Time reviewed: 0216  Symptoms at time of EKG: Stroke code   Rhythm: normal sinus   Rate: normal  Axis: normal  Ectopy: none  Conduction: normal, QTc 490  ST Segments/ T Waves: No acute ischemic change  Q Waves: none  Comparison to prior: No old EKG available    Clinical Impression: normal sinus rhythm with no acute ischemic change        The patient has stroke symptoms:         ED Stroke specific documentation           NIHSS PDF     Patient last known well time: 12 midnight  ED Provider first to bedside at: upon arrival 0107  CT Results received at: 0146    Thrombolytics:   Not given due to:   - minor/isolated/quickly resolving symptoms  - stroke mimic: hypoglycemia provoked seizure    If treating with thrombolytics: Ensure SBP<180 and DBP<105 prior to treatment with thrombolytics.  Administering thrombolytics after treatment with IV labetalol, hydralazine, or nicardipine is reasonable once BP control is established.    Endovascular Retrieval:  Not initiated due to absence of proximal vessel occlusion    National Institutes of Health Stroke Scale (Baseline)  Time Performed: Upon arrival NIH 9  Repeat NIH after CT imaging 3      Stroke Mimics were considered (including migraine headache, seizure disorder, hypoglycemia (or hyperglycemia), head or spinal trauma, CNS infection, Toxin ingestion and shock state (e.g. sepsis) .           Results for orders placed or performed during the hospital encounter of 03/07/24   CT Head w/o Contrast     Status: None    Narrative    EXAM: CT HEAD W/O  CONTRAST  LOCATION: [facility]  DATE: 3/7/2024    INDICATION: Code Stroke to evaluate for potential thrombolysis and thrombectomy.   COMPARISON: None.  TECHNIQUE: Routine CT Head without IV contrast.  Dose reduction techniques were used.    FINDINGS:  INTRACRANIAL CONTENTS: No intracranial hemorrhage, extraaxial collection, or mass effect. No definite CT evidence of acute large territory infarction. Linear encephalomalacia is identified involving the right external capsule and left external   capsule/subinsular regions. Mild to moderate presumed chronic small vessel ischemic changes. Mild to moderate generalized volume loss. No hydrocephalus.     VISUALIZED ORBITS/SINUSES/MASTOIDS: No intraorbital abnormality. No paranasal sinus mucosal disease. No middle ear or mastoid effusion.    BONES/SOFT TISSUES: No acute abnormality.      Impression    IMPRESSION:  1. No acute intracranial process. Specifically, no acute hemorrhage or acute large territory infarction. If the patient is experiencing an acute, focal or ongoing neurologic deficit, an MRI may be indicated.    2. Chronic senescent and ischemic changes, as above.        Dr. Zoltan Ashford discussed results with Dr. Estela Hayes on 3/7/2024 1:45 AM CST.   CTA Head Neck w Contrast     Status: None    Narrative    EXAM: CTA HEAD NECK W CONTRAST  LOCATION: [facility]  DATE: 3/7/2024    INDICATION: Code Stroke to evaluate for potential thrombolysis and thrombectomy.  COMPARISON: None.  CONTRAST: 67 mL Isovue 3 7  TECHNIQUE: Head and neck CT angiogram with IV contrast. Axial helical CT images of the head and neck vessels obtained during the arterial phase of intravenous contrast administration. Axial 2D reconstructed images and multiplanar 3D MIP reconstructed   images of the head and neck vessels were performed by the technologist. Dose reduction techniques were used. All  stenosis measurements made according to NASCET criteria unless otherwise specified.    FINDINGS:     HEAD CTA:  ANTERIOR CIRCULATION: Calcific plaque noted involving the bilateral cavernous and supraclinoid ICAs causing up to mild to moderate stenosis. No proximal large vessel occlusion. Scattered multifocal intracranial stenoses are identified involving the   bilateral anterior circulation, most pronounced within the bilateral MCA distributions. Some of these regions demonstrate a somewhat beaded appearance. There is moderate to severe short segment narrowing involving the proximal M2 ascending division right   middle cerebral artery. Additionally, the mid to distal right M2/M3 ascending division middle cerebral artery demonstrates multifocal moderate to severe stenosis. There is multifocal moderate to severe stenosis involving the right mid M2 inferior   division middle cerebral artery. Additionally, the posterior division right middle cerebral artery involving the proximal to mid M2 segments and mid to distal M2/M3 segments demonstrate moderate to severe multifocal stenoses.    There is moderate short segment stenosis of the left mid to distal A2 anterior cerebral artery. There is moderate to severe multifocal stenosis of the ascending division mid to distal M2 left middle cerebral artery. There is moderate stenosis of the   distal left M2 posterior division middle cerebral artery. Severe short segment near occlusive stenosis involving a distal left branching M2/proximal M3 left middle cerebral artery (image 25 series 10).     No aneurysm. No high flow vascular malformation. Standard [place] anatomy.    POSTERIOR CIRCULATION: No proximal large vessel occlusion. Mild to moderate narrowing of the left P2 posterior cerebral artery. No aneurysm. No high flow vascular malformation. Dominant right and smaller left vertebral artery contribute to a normal   basilar artery.     DURAL VENOUS SINUSES: Expected  enhancement of the major dural venous sinuses.    NECK CTA:  RIGHT CAROTID: Mild plaque at the bifurcation and proximal right ICA. No high-grade stenosis or dissection.    LEFT CAROTID: Mild to moderate calcified and noncalcified plaque involving the left carotid bifurcation and left proximal ICA. There is approximately 50-60% stenosis involving the left carotid bifurcation and proximal left ICA. No high-grade stenosis. No   dissection.    VERTEBRAL ARTERIES: Moderate to severe narrowing involving the origin of the right vertebral artery due to calcified and noncalcified plaque. No dissection on the right. No significant stenosis or dissection involving the left vertebral artery.     AORTIC ARCH: Three-vessel aortic arch anatomy. Calcified and noncalcified plaque noted involving the aortic arch and origins of great vessels. No significant stenosis is noted involving the origins of the great vessels. There is mild narrowing involving   the origin/proximal left subclavian artery due to predominantly calcified plaque.    NONVASCULAR STRUCTURES: Dependent atelectasis noted involving the upper and lower lobes of the lungs. Heterogeneous thyroid. Dominant low-density nodule within the left lobe measuring 8 mm.      Impression    IMPRESSION:     HEAD CTA:   1.  No proximal large vessel occlusion.  2.  Multifocal regions of moderate to severe stenoses, predominantly involving the anterior circulation, as above. These findings may be related to atheromatous disease or vasculitis/vasculopathy.   3.  Severe, short segment near occlusive stenosis involving a distal left branching M2/proximal M3 left middle cerebral artery.  4.  Mild to moderate stenosis of the left P2 posterior cerebral artery.    NECK CTA:  1.  Scattered atheromatous disease, as above.  2.  Moderate stenosis of the left carotid bifurcation and left proximal ICA measuring 50-60%.  3.  Moderate to severe stenosis involving the origin of the right vertebral  artery.  4.  No dissection.            Dr. Zoltan Ashford discussed results with Estela Hayes MD on 03/07/2024 at 2:04 AM.   MR Brain w/o Contrast     Status: None    Narrative    EXAM: MR BRAIN W/O CONTRAST  LOCATION: [facility]  DATE: 3/7/2024    INDICATION: Neuro deficit. Altered mental status and unresponsive. History of prior strokes.  COMPARISON: 03/07/2024  TECHNIQUE: Routine multiplanar multisequence head MRI without intravenous contrast.    FINDINGS:  INTRACRANIAL CONTENTS: No acute or subacute infarct. No mass, acute hemorrhage, or extra-axial fluid collections. Patchy and confluent nonspecific T2/FLAIR hyperintensities within the cerebral white matter most consistent with moderate chronic   microvascular ischemic change. Moderate generalized cerebral atrophy. No hydrocephalus. Chronic infarcts in the bilateral basal ganglia and external capsules, with hemosiderin staining on the left. Chronic microhemorrhage in the right centrum semiovale.   Asymmetric encephalomalacia and gliosis in the right frontal lobe white matter, which may reflect postischemic change. Normal position of the cerebellar tonsils.     SELLA: No abnormality accounting for technique.    OSSEOUS STRUCTURES/SOFT TISSUES: Normal marrow signal. The major intracranial vascular flow voids are maintained.     ORBITS: Prior bilateral cataract surgery. Visualized portions of the orbits are otherwise unremarkable.     SINUSES/MASTOIDS: Minimal mucosal thickening scattered about the paranasal sinuses. Small bilateral mastoid effusions.       Impression    IMPRESSION:  1.  No acute intracranial process.  2.  Chronic changes as detailed above.   Basic metabolic panel     Status: Abnormal   Result Value Ref Range    Sodium 136 135 - 145 mmol/L    Potassium 3.6 3.4 - 5.3 mmol/L    Chloride 102 98 - 107 mmol/L    Carbon Dioxide (CO2) 26 22 - 29 mmol/L    Anion Gap 8 7 - 15 mmol/L    Urea Nitrogen 32.5  (H) 8.0 - 23.0 mg/dL    Creatinine 1.50 (H) 0.51 - 0.95 mg/dL    GFR Estimate 38 (L) >60 mL/min/1.73m2    Calcium 8.8 8.8 - 10.2 mg/dL    Glucose 224 (H) 70 - 99 mg/dL   INR     Status: Normal   Result Value Ref Range    INR 0.99 0.85 - 1.15   Partial thromboplastin time     Status: Normal   Result Value Ref Range    aPTT 31 22 - 38 Seconds   Troponin T, High Sensitivity     Status: Abnormal   Result Value Ref Range    Troponin T, High Sensitivity 26 (H) <=14 ng/L   CBC with platelets and differential     Status: Abnormal   Result Value Ref Range    WBC Count 4.9 4.0 - 11.0 10e3/uL    RBC Count 3.80 3.80 - 5.20 10e6/uL    Hemoglobin 10.8 (L) 11.7 - 15.7 g/dL    Hematocrit 34.0 (L) 35.0 - 47.0 %    MCV 90 78 - 100 fL    MCH 28.4 26.5 - 33.0 pg    MCHC 31.8 31.5 - 36.5 g/dL    RDW 13.3 10.0 - 15.0 %    Platelet Count 174 150 - 450 10e3/uL    % Neutrophils 59 %    % Lymphocytes 30 %    % Monocytes 8 %    % Eosinophils 2 %    % Basophils 1 %    % Immature Granulocytes 0 %    NRBCs per 100 WBC 0 <1 /100    Absolute Neutrophils 2.9 1.6 - 8.3 10e3/uL    Absolute Lymphocytes 1.5 0.8 - 5.3 10e3/uL    Absolute Monocytes 0.4 0.0 - 1.3 10e3/uL    Absolute Eosinophils 0.1 0.0 - 0.7 10e3/uL    Absolute Basophils 0.0 0.0 - 0.2 10e3/uL    Absolute Immature Granulocytes 0.0 <=0.4 10e3/uL    Absolute NRBCs 0.0 10e3/uL   Glucose by meter     Status: Abnormal   Result Value Ref Range    GLUCOSE BY METER POCT 206 (H) 70 - 99 mg/dL   iStat INR, POCT     Status: Abnormal   Result Value Ref Range    INR POCT 1.1 (L) 2.0 - 3.0   Extra Tube ([place] Draw)     Status: None    Narrative    The following orders were created for panel order Extra Tube ([place] Draw).  Procedure                               Abnormality         Status                     ---------                               -----------         ------                     Extra Red Top Tube[[id number]]                               Final result                 Please view results  for these tests on the individual orders.   Extra Red Top Tube     Status: None   Result Value Ref Range    Hold Specimen JI    Creatinine POCT     Status: Abnormal   Result Value Ref Range    Creatinine POCT 1.6 (H) 0.5 - 1.0 mg/dL    GFR, ESTIMATED POCT 35 (L) >60 mL/min/1.73m2   iStat Gases Electrolytes ICA Glucose Venous, POCT     Status: Abnormal   Result Value Ref Range    CPB Applied No     Hematocrit POCT 34 (L) 35 - 47 %    Calcium, Ionized Whole Blood POCT 5.5 (H) 4.4 - 5.2 mg/dL    Glucose Whole Blood POCT 210 (H) 70 - 99 mg/dL    Bicarbonate Venous POCT 26 21 - 28 mmol/L    Hemoglobin POCT 11.6 (L) 11.7 - 15.7 g/dL    Potassium POCT 3.5 3.4 - 5.3 mmol/L    Sodium POCT 137 135 - 145 mmol/L    pCO2 Venous POCT 49 40 - 50 mm Hg    pO2 Venous POCT 42 25 - 47 mm Hg    pH Venous POCT 7.33 7.32 - 7.43    O2 Sat, Venous POCT 73 70 - 75 %   iStat Gases (lactate) venous, POCT     Status: Abnormal   Result Value Ref Range    Lactic Acid POCT 0.6 <=2.0 mmol/L    Bicarbonate Venous POCT 25 21 - 28 mmol/L    O2 Sat, Venous POCT 81 (H) 70 - 75 %    pCO2 Venous POCT 47 40 - 50 mm Hg    pH Venous POCT 7.33 7.32 - 7.43    pO2 Venous POCT 49 (H) 25 - 47 mm Hg   iStat Troponin, POCT     Status: Normal   Result Value Ref Range    TROPPC POCT 0.00 <=0.12 ug/L   Glucose by meter     Status: Normal   Result Value Ref Range    GLUCOSE BY METER POCT 86 70 - 99 mg/dL   Glucose by meter     Status: Normal   Result Value Ref Range    GLUCOSE BY METER POCT 86 70 - 99 mg/dL   Glucose by meter     Status: Abnormal   Result Value Ref Range    GLUCOSE BY METER POCT 223 (H) 70 - 99 mg/dL   Glucose by meter     Status: Abnormal   Result Value Ref Range    GLUCOSE BY METER POCT 226 (H) 70 - 99 mg/dL   EKG 12-lead, tracing only     Status: None (Preliminary result)   Result Value Ref Range    Systolic Blood Pressure  mmHg    Diastolic Blood Pressure  mmHg    Ventricular Rate 79 BPM    Atrial Rate 79 BPM    MN Interval 162 ms    QRS Duration  96 ms     ms    QTc 490 ms    P Axis 55 degrees    R AXIS 37 degrees    T Axis 26 degrees    Interpretation ECG Sinus rhythm  Prolonged QT  Abnormal ECG      CBC with Platelets & Differential     Status: Abnormal    Narrative    The following orders were created for panel order CBC with Platelets & Differential.  Procedure                               Abnormality         Status                     ---------                               -----------         ------                     CBC with platelets and d...[[id number]]  Abnormal            Final result                 Please view results for these tests on the individual orders.     Medications   dextrose 10% infusion ( Intravenous $New Bag 3/7/24 0151)   iopamidol (ISOVUE-370) solution 67 mL (67 mLs Intravenous $Given 3/7/24 0131)   sodium chloride (PF) 0.9% PF flush 72 mL (72 mLs Intravenous $Given 3/7/24 0130)   ondansetron (ZOFRAN) injection 4 mg (4 mg Intravenous $Given 3/7/24 0137)   dextrose 50 % injection 50 mL (50 mLs Intravenous $Given 3/7/24 0248)     Labs Ordered and Resulted from Time of ED Arrival to Time of ED Departure   BASIC METABOLIC PANEL - Abnormal       Result Value    Sodium 136      Potassium 3.6      Chloride 102      Carbon Dioxide (CO2) 26      Anion Gap 8      Urea Nitrogen 32.5 (*)     Creatinine 1.50 (*)     GFR Estimate 38 (*)     Calcium 8.8      Glucose 224 (*)    TROPONIN T, HIGH SENSITIVITY - Abnormal    Troponin T, High Sensitivity 26 (*)    CBC WITH PLATELETS AND DIFFERENTIAL - Abnormal    WBC Count 4.9      RBC Count 3.80      Hemoglobin 10.8 (*)     Hematocrit 34.0 (*)     MCV 90      MCH 28.4      MCHC 31.8      RDW 13.3      Platelet Count 174      % Neutrophils 59      % Lymphocytes 30      % Monocytes 8      % Eosinophils 2      % Basophils 1      % Immature Granulocytes 0      NRBCs per 100 WBC 0      Absolute Neutrophils 2.9      Absolute Lymphocytes 1.5      Absolute Monocytes 0.4      Absolute Eosinophils 0.1       Absolute Basophils 0.0      Absolute Immature Granulocytes 0.0      Absolute NRBCs 0.0     GLUCOSE BY METER - Abnormal    GLUCOSE BY METER POCT 206 (*)    ISTAT INR POCT - Abnormal    INR POCT 1.1 (*)    ISTAT CREATININE POCT - Abnormal    Creatinine POCT 1.6 (*)     GFR, ESTIMATED POCT 35 (*)    ISTAT GASES ELECTROLYTES ICA GLUCOSE VENOUS POCT - Abnormal    CPB Applied No      Hematocrit POCT 34 (*)     Calcium, Ionized Whole Blood POCT 5.5 (*)     Glucose Whole Blood POCT 210 (*)     Bicarbonate Venous POCT 26      Hemoglobin POCT 11.6 (*)     Potassium POCT 3.5      Sodium POCT 137      pCO2 Venous POCT 49      pO2 Venous POCT 42      pH Venous POCT 7.33      O2 Sat, Venous POCT 73     ISTAT GASES LACTATE VENOUS POCT - Abnormal    Lactic Acid POCT 0.6      Bicarbonate Venous POCT 25      O2 Sat, Venous POCT 81 (*)     pCO2 Venous POCT 47      pH Venous POCT 7.33      pO2 Venous POCT 49 (*)    GLUCOSE BY METER - Abnormal    GLUCOSE BY METER POCT 223 (*)    GLUCOSE BY METER - Abnormal    GLUCOSE BY METER POCT 226 (*)    INR - Normal    INR 0.99     PARTIAL THROMBOPLASTIN TIME - Normal    aPTT 31     ISTAT TROPONIN POCT - Normal    TROPPC POCT 0.00     GLUCOSE BY METER - Normal    GLUCOSE BY METER POCT 86     GLUCOSE BY METER - Normal    GLUCOSE BY METER POCT 86     GLUCOSE MONITOR NURSING POCT     MR Brain w/o Contrast   Final Result   IMPRESSION:   1.  No acute intracranial process.   2.  Chronic changes as detailed above.      CTA Head Neck w Contrast   Final Result   IMPRESSION:       HEAD CTA:    1.  No proximal large vessel occlusion.   2.  Multifocal regions of moderate to severe stenoses, predominantly involving the anterior circulation, as above. These findings may be related to atheromatous disease or vasculitis/vasculopathy.    3.  Severe, short segment near occlusive stenosis involving a distal left branching M2/proximal M3 left middle cerebral artery.   4.  Mild to moderate stenosis of the left P2  posterior cerebral artery.      NECK CTA:   1.  Scattered atheromatous disease, as above.   2.  Moderate stenosis of the left carotid bifurcation and left proximal ICA measuring 50-60%.   3.  Moderate to severe stenosis involving the origin of the right vertebral artery.   4.  No dissection.                  Dr. Zoltan Ashford discussed results with Estela Hayes MD on 03/07/2024 at 2:04 AM.      CT Head w/o Contrast   Final Result   IMPRESSION:   1. No acute intracranial process. Specifically, no acute hemorrhage or acute large territory infarction. If the patient is experiencing an acute, focal or ongoing neurologic deficit, an MRI may be indicated.      2. Chronic senescent and ischemic changes, as above.            Dr. Zoltan Ashford discussed results with Dr. Esetla Hayes on 3/7/2024 1:45 AM CST.      CTA Angiogram heart    (Results Pending)   Radiologist Consult For Cardiology    (Results Pending)          Critical care was performed.   Critical Care Addendum  My initial assessment, based on my review of prehospital provider report, review of nursing observations, review of vital signs, focused history, physical exam, review of cardiac rhythm monitor, 12 lead ECG analysis, discussion with family, neurology/stroke team, and interpretation of multiple labs, CT imaging , established a high suspicion that Isabell Matthews has altered mental status, a high risk electrolyte abnormality, and stroke code , which requires immediate intervention, and therefore she is critically ill.     After the initial assessment, the care team initiated multiple lab tests, initiated IV fluid administration, and initiated medication therapy with Dextrose, D10  to provide stabilization care. Due to the critical nature of this patient, I reassessed nursing observations, vital signs, physical exam, review of cardiac rhythm monitor, mental status, neurologic status, and respiratory status multiple times prior to her disposition.      Time also spent performing documentation, discussion with family to obtain medical information for decision making, reviewing test results, discussion with consultants, and coordination of care.     Critical care time (excluding teaching time and procedures): 60 minutes.     Assessment & Plan    Cassie Gonzalez is a 63 year old female with PMH notable for DM1 with diabetic polyneuropathy, vascular dementia, seizures on Keppra, acute ischemic stroke (04/2022) on Plavix, HTN, HLD, CKD who presents to the Emergency Department via EMS for evaluation of AMS/decreased responsiveness, and hypoglycemia.  Per EMS patient was found to have low blood glucose upon arrival, D10 started and route.  Upon arrival to the emergency department patient intermittently responsive, opening eyes spontaneously however not following commands upon arrival.  Patient repeat glucose level upon arrival to the emergency department was 206 so given blood glucose level 206 and decreased responsiveness/not following commands, history CVA and seizures and unknown baseline code stroke was called. Neurology stroke team at bedside.  Patient slowly becoming more responsive, awake, alert, following some simple commands. No focal deficits noted.     Family arrived and provided additional history.  Daughter typically does patient medications however tonight daughter believes patient gave herself her own insulin. (Received double dose tonight). Also noted patient to have episode of incontinence and tongue laceration.  Patient does have history of dementia, CVA, incontinence, and right-sided facial droop.  Patient takes 81 mg aspirin daily, no other chronic anticoagulation.     I Reviewed comprehensive labs which are remarkable for white blood cell count of 4.9, hemoglobin 10.8, INR 1.1, i-STAT troponin 0, lactic acid 0.6, i-STAT creatinine 1.6.  Initial high sensitive troponin 26, creatinine mildly elevated 1.5, no acute metabolic or electrolyte abnormality.   Glucose 224.  I reviewed EKG which demonstrates normal sinus rhythm with a ventricular to 79 bpm no acute ischemic change.    I re-discussed patient management with neurostroke team who at this time concern for possible hypoglycemic episode with seizure.  Noted tongue laceration on the left, incontinence, patient still in TNK window, NIH score of 2, will send for hyperacute MRI brain. Stroke code downgraded at 0133.     Patient sent over for CT imaging with neurology bedside.  I discussed CT imaging with radiologist at 0146 -CT head with no acute intracranial process, no acute hemorrhage, or large stroke.  Chronic changes noted.  I discussed CTA of the head and neck with radiologist at 0205 demonstrates no proximal large vessel occlusions, multifocal regions of moderate to severe stenosis severe short segment near occlusive stenosis of the distal left branching M2 proximal M3 left MCA.  No dissection. See report for complete details.     I personally reviewed and interpreted MRI which is unremarkable with no acute intracranial process, no evidence of stroke.  I discussed patient management with neurology/stroke team, given patient's clinical presentation, history, likely episode of hypoglycemia (secondary to double dose of her insulin) with hypoglycemic induced seizure (given history of incontinence, tongue laceration, likely postictal state upon arrival to the emergency department with rapidly improving symptoms over ED course).     On reevaluation patient is back to baseline, no complaints.  Patient eating and drinking without difficulty.    Patient signed out to morning provider pending neurology stroke team rounding this morning, if neurology/stroke team signed off on patient likely discharge home.  Patient and family understand agrees with plan.        I have reviewed the nursing notes. I have reviewed the findings, diagnosis, plan and need for follow up with the patient.    New Prescriptions    No medications  on file       Final diagnoses:   Altered mental status, unspecified altered mental status type   Hypoglycemia   Seizure (H)       Estela Hayes MD  [facility] EMERGENCY DEPARTMENT  3/7/2024     Estela Hayes MD  03/07/24 0624

## 2024-03-07 NOTE — ED TRIAGE NOTES
Pt arrived from home via ems w/ c/c of hypoglycemia daughter noticed pt drooling and slumped over on couch @ 0040. Ems state BGL registered as low.  Ems started D10 Pt has hx of stroke LNW @ midnight

## 2024-03-09 ASSESSMENT — ACTIVITIES OF DAILY LIVING (ADL)
ADLS_ACUITY_SCORE: 35

## 2024-05-05 ENCOUNTER — HEALTH MAINTENANCE LETTER (OUTPATIENT)
Age: 64
End: 2024-05-05

## 2024-05-20 ENCOUNTER — OFFICE VISIT (OUTPATIENT)
Dept: FAMILY MEDICINE | Facility: CLINIC | Age: 64
End: 2024-05-20
Payer: COMMERCIAL

## 2024-05-20 ENCOUNTER — LAB (OUTPATIENT)
Dept: LAB | Facility: CLINIC | Age: 64
End: 2024-05-20
Payer: COMMERCIAL

## 2024-05-20 VITALS
TEMPERATURE: 97.8 F | DIASTOLIC BLOOD PRESSURE: 75 MMHG | OXYGEN SATURATION: 98 % | HEART RATE: 74 BPM | SYSTOLIC BLOOD PRESSURE: 112 MMHG

## 2024-05-20 DIAGNOSIS — F01.53 VASCULAR DEMENTIA WITH DEPRESSED MOOD (H): ICD-10-CM

## 2024-05-20 DIAGNOSIS — E10.59 TYPE 1 DIABETES MELLITUS WITH OTHER CIRCULATORY COMPLICATION (H): ICD-10-CM

## 2024-05-20 DIAGNOSIS — E10.59 TYPE 1 DIABETES MELLITUS WITH OTHER CIRCULATORY COMPLICATION (H): Primary | ICD-10-CM

## 2024-05-20 DIAGNOSIS — L30.9 DERMATITIS: ICD-10-CM

## 2024-05-20 DIAGNOSIS — D64.9 ANEMIA, UNSPECIFIED TYPE: ICD-10-CM

## 2024-05-20 DIAGNOSIS — I10 ESSENTIAL HYPERTENSION: ICD-10-CM

## 2024-05-20 DIAGNOSIS — E03.9 ACQUIRED HYPOTHYROIDISM: ICD-10-CM

## 2024-05-20 DIAGNOSIS — M79.7 FIBROMYALGIA: ICD-10-CM

## 2024-05-20 DIAGNOSIS — R56.9 SEIZURES (H): ICD-10-CM

## 2024-05-20 DIAGNOSIS — K59.01 SLOW TRANSIT CONSTIPATION: ICD-10-CM

## 2024-05-20 DIAGNOSIS — E78.5 HYPERLIPIDEMIA LDL GOAL <70: ICD-10-CM

## 2024-05-20 LAB
ALBUMIN SERPL BCG-MCNC: 3.8 G/DL (ref 3.5–5.2)
ALP SERPL-CCNC: 150 U/L (ref 40–150)
ALT SERPL W P-5'-P-CCNC: 17 U/L (ref 0–50)
ANION GAP SERPL CALCULATED.3IONS-SCNC: 8 MMOL/L (ref 7–15)
AST SERPL W P-5'-P-CCNC: 21 U/L (ref 0–45)
BASOPHILS # BLD AUTO: 0 10E3/UL (ref 0–0.2)
BASOPHILS NFR BLD AUTO: 0 %
BILIRUB SERPL-MCNC: 0.2 MG/DL
BUN SERPL-MCNC: 40.4 MG/DL (ref 8–23)
CALCIUM SERPL-MCNC: 9.2 MG/DL (ref 8.8–10.2)
CHLORIDE SERPL-SCNC: 108 MMOL/L (ref 98–107)
CREAT SERPL-MCNC: 1.63 MG/DL (ref 0.51–0.95)
DEPRECATED HCO3 PLAS-SCNC: 25 MMOL/L (ref 22–29)
EGFRCR SERPLBLD CKD-EPI 2021: 35 ML/MIN/1.73M2
EOSINOPHIL # BLD AUTO: 0.1 10E3/UL (ref 0–0.7)
EOSINOPHIL NFR BLD AUTO: 2 %
ERYTHROCYTE [DISTWIDTH] IN BLOOD BY AUTOMATED COUNT: 13.6 % (ref 10–15)
FERRITIN SERPL-MCNC: 12 NG/ML (ref 11–328)
GLUCOSE SERPL-MCNC: 242 MG/DL (ref 70–99)
HBA1C MFR BLD: 10.2 %
HCT VFR BLD AUTO: 35.2 % (ref 35–47)
HGB BLD-MCNC: 11.1 G/DL (ref 11.7–15.7)
IMM GRANULOCYTES # BLD: 0 10E3/UL
IMM GRANULOCYTES NFR BLD: 0 %
LYMPHOCYTES # BLD AUTO: 1.4 10E3/UL (ref 0.8–5.3)
LYMPHOCYTES NFR BLD AUTO: 30 %
MCH RBC QN AUTO: 27.9 PG (ref 26.5–33)
MCHC RBC AUTO-ENTMCNC: 31.5 G/DL (ref 31.5–36.5)
MCV RBC AUTO: 88 FL (ref 78–100)
MONOCYTES # BLD AUTO: 0.3 10E3/UL (ref 0–1.3)
MONOCYTES NFR BLD AUTO: 6 %
NEUTROPHILS # BLD AUTO: 2.8 10E3/UL (ref 1.6–8.3)
NEUTROPHILS NFR BLD AUTO: 62 %
NRBC # BLD AUTO: 0 10E3/UL
NRBC BLD AUTO-RTO: 0 /100
PLATELET # BLD AUTO: 201 10E3/UL (ref 150–450)
POTASSIUM SERPL-SCNC: 5.1 MMOL/L (ref 3.4–5.3)
PROT SERPL-MCNC: 6.5 G/DL (ref 6.4–8.3)
RBC # BLD AUTO: 3.98 10E6/UL (ref 3.8–5.2)
SODIUM SERPL-SCNC: 141 MMOL/L (ref 135–145)
TSH SERPL DL<=0.005 MIU/L-ACNC: 0.8 UIU/ML (ref 0.3–4.2)
WBC # BLD AUTO: 4.5 10E3/UL (ref 4–11)

## 2024-05-20 PROCEDURE — 80053 COMPREHEN METABOLIC PANEL: CPT | Performed by: PATHOLOGY

## 2024-05-20 PROCEDURE — 83036 HEMOGLOBIN GLYCOSYLATED A1C: CPT | Performed by: FAMILY MEDICINE

## 2024-05-20 PROCEDURE — 99000 SPECIMEN HANDLING OFFICE-LAB: CPT | Performed by: PATHOLOGY

## 2024-05-20 PROCEDURE — 84443 ASSAY THYROID STIM HORMONE: CPT | Performed by: PATHOLOGY

## 2024-05-20 PROCEDURE — 99215 OFFICE O/P EST HI 40 MIN: CPT | Performed by: FAMILY MEDICINE

## 2024-05-20 PROCEDURE — 82728 ASSAY OF FERRITIN: CPT | Performed by: PATHOLOGY

## 2024-05-20 PROCEDURE — G2211 COMPLEX E/M VISIT ADD ON: HCPCS | Performed by: FAMILY MEDICINE

## 2024-05-20 PROCEDURE — 85025 COMPLETE CBC W/AUTO DIFF WBC: CPT | Performed by: PATHOLOGY

## 2024-05-20 PROCEDURE — 36415 COLL VENOUS BLD VENIPUNCTURE: CPT | Performed by: PATHOLOGY

## 2024-05-20 RX ORDER — CYCLOBENZAPRINE HCL 10 MG
10 TABLET ORAL
Qty: 30 TABLET | Refills: 0 | Status: ON HOLD | OUTPATIENT
Start: 2024-05-20 | End: 2024-08-28

## 2024-05-20 RX ORDER — LEVETIRACETAM 500 MG/1
500 TABLET ORAL 2 TIMES DAILY
Qty: 180 TABLET | Refills: 3 | Status: SHIPPED | OUTPATIENT
Start: 2024-05-20 | End: 2024-10-07

## 2024-05-20 RX ORDER — INSULIN LISPRO 100 [IU]/ML
INJECTION, SOLUTION INTRAVENOUS; SUBCUTANEOUS
Qty: 15 ML | Refills: 11 | Status: SHIPPED | OUTPATIENT
Start: 2024-05-20 | End: 2024-05-29

## 2024-05-20 RX ORDER — DULOXETIN HYDROCHLORIDE 20 MG/1
20 CAPSULE, DELAYED RELEASE ORAL DAILY
Qty: 90 CAPSULE | Refills: 3 | Status: SHIPPED | OUTPATIENT
Start: 2024-05-20 | End: 2024-10-07

## 2024-05-20 RX ORDER — POLYETHYLENE GLYCOL 3350 17 G/17G
1 POWDER, FOR SOLUTION ORAL EVERY MORNING
Qty: 578 G | Refills: 3 | Status: SHIPPED | OUTPATIENT
Start: 2024-05-20 | End: 2024-06-28

## 2024-05-20 RX ORDER — ATORVASTATIN CALCIUM 40 MG/1
40 TABLET, FILM COATED ORAL DAILY
Qty: 90 TABLET | Refills: 3 | Status: SHIPPED | OUTPATIENT
Start: 2024-05-20 | End: 2024-07-17

## 2024-05-20 RX ORDER — LORATADINE 10 MG/1
10 TABLET ORAL DAILY
Qty: 90 TABLET | Refills: 3 | Status: SHIPPED | OUTPATIENT
Start: 2024-05-20 | End: 2024-10-07

## 2024-05-20 RX ORDER — GABAPENTIN 100 MG/1
100 CAPSULE ORAL AT BEDTIME
Qty: 90 CAPSULE | Refills: 3 | Status: SHIPPED | OUTPATIENT
Start: 2024-05-20 | End: 2024-10-07

## 2024-05-20 ASSESSMENT — PATIENT HEALTH QUESTIONNAIRE - PHQ9
5. POOR APPETITE OR OVEREATING: NOT AT ALL
SUM OF ALL RESPONSES TO PHQ QUESTIONS 1-9: 2

## 2024-05-20 ASSESSMENT — ANXIETY QUESTIONNAIRES
2. NOT BEING ABLE TO STOP OR CONTROL WORRYING: NOT AT ALL
GAD7 TOTAL SCORE: 0
6. BECOMING EASILY ANNOYED OR IRRITABLE: NOT AT ALL
1. FEELING NERVOUS, ANXIOUS, OR ON EDGE: NOT AT ALL
IF YOU CHECKED OFF ANY PROBLEMS ON THIS QUESTIONNAIRE, HOW DIFFICULT HAVE THESE PROBLEMS MADE IT FOR YOU TO DO YOUR WORK, TAKE CARE OF THINGS AT HOME, OR GET ALONG WITH OTHER PEOPLE: NOT DIFFICULT AT ALL
3. WORRYING TOO MUCH ABOUT DIFFERENT THINGS: NOT AT ALL
7. FEELING AFRAID AS IF SOMETHING AWFUL MIGHT HAPPEN: NOT AT ALL
GAD7 TOTAL SCORE: 0
5. BEING SO RESTLESS THAT IT IS HARD TO SIT STILL: NOT AT ALL

## 2024-05-20 NOTE — PROGRESS NOTES
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  Updated her prescriptions.  I placed referral to ophthalmology  - gabapentin (NEURONTIN) 100 MG capsule  Dispense: 90 capsule; Refill: 3  - insulin glargine (LANTUS PEN) 100 UNIT/ML pen  Dispense: 30 mL; Refill: 3  - insulin lispro (HUMALOG KWIKPEN) 100 UNIT/ML (1 unit dial) KWIKPEN  Dispense: 15 mL; Refill: 11  - Hemoglobin A1c  - Comprehensive metabolic panel (BMP + Alb, Alk Phos, ALT, AST, Total. Bili, TP)  - Adult Eye  Referral    Vascular dementia with depressed mood (H)  Stable    Acquired hypothyroidism  It appears she has several refills for Synthroid 75 mcg  - TSH with free T4 reflex    Essential hypertension  She has adequate refills for carvedilol 12.5 mg twice daily, amlodipine 5 mg daily  - Comprehensive metabolic panel (BMP + Alb, Alk Phos, ALT, AST, Total. Bili, TP)    Anemia, unspecified type  She appears slightly pale will check hemoglobin and ferritin  - CBC with platelets and differential  - Ferritin    Hyperlipidemia LDL goal <70  Refills provided for atorvastatin  - atorvastatin (LIPITOR) 40 MG tablet  Dispense: 90 tablet; Refill: 3    Fibromyalgia  Refills provided  - cyclobenzaprine (FLEXERIL) 10 MG tablet  Dispense: 30 tablet; Refill: 0  - DULoxetine (CYMBALTA) 20 MG capsule  Dispense: 90 capsule; Refill: 3    Seizures (H)   Refill provided  - levETIRAcetam (KEPPRA) 500 MG tablet  Dispense: 180 tablet; Refill: 3    Dermatitis  Refill provided  - loratadine (CLARITIN) 10 MG tablet  Dispense: 90 tablet; Refill: 3    Slow transit constipation  I reviewed should be having a bowel movement every 1 to 2 days therefore we will add psyllium capsules with 8 ounces of water daily and MiraLAX daily in order to  have a bowel movement daily.  - polyethylene glycol (MIRALAX) 17 GM/Dose powder  Dispense: 578 g; Refill: 3  - psyllium (METAMUCIL/KONSYL) capsule  Dispense: 90 capsule; Refill: 3          40 minutes spent on the date of the encounter doing chart review, history, exam, diagnostics review, documentation, counseling and coordination of cares as noted.   The longitudinal plan of care for the diagnosis(es)/condition(s) as documented were addressed during this visit. Due to the added complexity in care, I will continue to support Isabell in the subsequent management and with ongoing continuity of care.      Return in about 4 months (around 10/2/2024) for follow-up.   Bony Castaneda MD   Subjective   Isabell is a 63 year old, presenting for the following health issues:  RECHECK (LLQ pain, intermittent sharp pain, growth on third finger left hand, eye concerns)        5/20/2024     2:30 PM   Additional Questions   Roomed by KTE, EMT     History of Present Illness       Reason for visit:  Lower left abdominal pain  Symptom onset:  3-4 weeks ago  Symptoms include:  Intermittent sharp pain  Symptom intensity:  Moderate  Symptom progression:  Worsening  Had these symptoms before:  No    She eats 2-3 servings of fruits and vegetables daily.She consumes 0 sweetened beverage(s) daily.She exercises with enough effort to increase her heart rate 9 or less minutes per day.  She exercises with enough effort to increase her heart rate 3 or less days per week.   She is taking medications regularly.       Isabell is a 63 year old female with an extensive past medical history including hypertension, hyperlipidemia, diabetes mellitus insulin requiring, CKD stage 3b, coronary artery disease (s/p PCI 2004), hypothyroidism, degenerative arthritis, HX UTI, and prior history of ischemic stroke & Vascular dementia, seizure disorder,ED for status epilepticus and encephalopathy 12/10/22 hospitalized through 12/14/22, hospitalized 6/16 and 9/4/2023,  ER 3/7/2024 left short acting insulin out and she went low with glucose ER evaluation and recovered fast sent home with her Daughter Adrienne personal caregiver.   Daughter Maria Guadalupe feels able to continue as mother's PCA, Lisa wants to continue to live with her daughter.   Diabetes has been stable at home, thinks her A1c will be a little bit better.  She will be attending a  program therefore we need instructions on short acting insulin the  program could follow.  Long-acting insulin currently taking 20 units at bedtime.  Due for eye exam.  Pain in the left lower abdomen low grade intermittently. Sharp at times. Wondering about constipation.  They have noted a bluish discoloration of her fourth digit left hand that looks like a blood vessel possibly a distended vein.   to contact us for incontinence supplies. She has elderly waiver.  Healthcare maintenance  Due for RSV, Tdap and Shingrix 2 shot series vaccines through local pharmacy    Labs reviewed in EPIC  BP Readings from Last 3 Encounters:   05/20/24 112/75   03/07/24 (!) 156/77   01/29/24 (!) 160/81    Wt Readings from Last 3 Encounters:   03/07/24 61.2 kg (135 lb)   01/29/24 60 kg (132 lb 4.8 oz)   09/04/23 59.7 kg (131 lb 9.8 oz)            Immunization History   Administered Date(s) Administered    COVID-19 12+ (2023-24) (Pfizer) 10/16/2023    COVID-19 Bivalent 12+ (Pfizer) 10/31/2022    COVID-19 MONOVALENT 12+ (Pfizer) 03/17/2021, 04/06/2021, 10/25/2021    Influenza Vaccine 65+ (Fluzone HD) 11/02/2020, 10/31/2022, 10/16/2023    Influenza Vaccine >6 months,quad, PF 11/02/2020, 11/02/2020, 10/25/2021    Mantoux Tuberculin Skin Test 06/28/2021, 07/12/2021    Pneumococcal 20 valent Conjugate (Prevnar 20) 01/29/2024            Patient Active Problem List   Diagnosis    Acquired hypothyroidism    Seizures (H)    Hyperlipidemia LDL goal <100    DKA (diabetic ketoacidoses)    Nephrolithiasis    Left renal mass    Coronary atherosclerosis     Dehydration    Ischemic vascular dementia (H)    Other osteoarthritis of spine, unspecified spinal region    Vitamin D deficiency    Vitamin B12 deficiency (non anemic)    Seizures (H)    Nausea & vomiting    Nail deformity    Mixed stress and urge urinary incontinence    Luetscher's syndrome    Left-sided nontraumatic intracerebral hemorrhage, unspecified cerebral location (H)    Vascular dementia without behavioral disturbance (H)    Fungal dermatitis    Type 1 diabetes mellitus with diabetic polyneuropathy (H)    Chest pressure    Essential hypertension    Dysphagia    Tobacco use    Dysthymia    History of diabetes with ketoacidosis    Stage 3b chronic kidney disease (H)    Full incontinence of feces    Acute CVA (cerebrovascular accident) (H)    Altered mental status, unspecified altered mental status type    Hyperglycemia    Infection due to 2019 novel coronavirus    Hx: UTI (urinary tract infection)    Fibromyalgia    E. 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)    Acute cystitis without hematuria    Cerebrovascular accident (CVA), unspecified mechanism (H)    Severe dementia without behavioral disturbance, psychotic disturbance, mood disturbance, or anxiety, unspecified dementia type (H)    Diabetic ketoacidosis without coma associated with type 1 diabetes mellitus (H)    Cognitive decline    Nausea and vomiting, unspecified vomiting type    Closed fracture of multiple ribs of right side, initial encounter     Past Surgical History:   Procedure Laterality Date    athroplasty hip Bilateral     2003, 2006    OTHER SURGICAL HISTORY      athroplasty hip    PICC DOUBLE LUMEN PLACEMENT Right 06/11/2023    right basilic 5 fr dl power picc 39 cm    STENT         Social History     Tobacco Use    Smoking status: Former     Current packs/day: 0.00     Average  packs/day: 0.5 packs/day for 35.0 years (17.5 ttl pk-yrs)     Types: Cigarettes     Start date: 1983     Quit date: 2018     Years since quittin.8    Smokeless tobacco: Never   Substance Use Topics    Alcohol use: Not Currently     Family History   Problem Relation Age of Onset    Acute Myocardial Infarction Father     Heart Disease Maternal Grandmother     Dementia Maternal Grandmother     Myocardial Infarction Father     Dementia Paternal Grandmother     Heart Disease Paternal Grandmother          Current Outpatient Medications   Medication Sig Dispense Refill    acetaminophen (TYLENOL) 325 MG tablet Take 3 tablets (975 mg) by mouth every 8 hours      amLODIPine (NORVASC) 5 MG tablet Take 5 mg by mouth daily      amLODIPine (NORVASC) 5 MG tablet Take 1 tablet (5 mg) by mouth daily 90 tablet 3    aspirin (ASA) 81 MG chewable tablet Take 1 tablet (81 mg) by mouth daily 90 tablet 3    aspirin 81 MG EC tablet Take 81 mg by mouth daily      atorvastatin (LIPITOR) 40 MG tablet Take 40 mg by mouth daily      atorvastatin (LIPITOR) 40 MG tablet Take 1 tablet (40 mg) by mouth daily 90 tablet 3    blood glucose (TRUE METRIX BLOOD GLUCOSE TEST) test strip USE TO TEST BLOOD SUGAR 4 TO 5 TIMES DAILY OR AS DIRECTED 400 strip 3    blood glucose monitoring (NO BRAND SPECIFIED) meter device kit Use to test blood sugar 4 times daily.  Please provide glucose meter that is covered by insurance. 1 kit 0    carvedilol (COREG) 12.5 MG tablet Take 1 tablet (12.5 mg) by mouth 2 times daily (with meals) 180 tablet 3    carvedilol (COREG) 6.25 MG tablet Take 2.5 mg by mouth 2 times daily (with meals)      Continuous Blood Gluc Sensor (FREESTYLE MARIA FERNANDA 2 SENSOR) [facility] [place] each every 14 days Use 1 sensor every 14 days. Use to read blood sugars per 's instructions. 2 each 5    cyanocobalamin (VITAMIN B-12) 1000 MCG tablet Take 1 tablet (1,000 mcg) by mouth daily 100 tablet 3    cyclobenzaprine (FLEXERIL) 10 MG tablet Take 1  tablet (10 mg) by mouth at bedtime 30 tablet 0    diclofenac (VOLTAREN) 1 % topical gel Apply 2 g topically 4 times daily as needed for moderate pain 150 g 1    DULoxetine (CYMBALTA) 20 MG capsule Take 20 mg by mouth 2 times daily      DULoxetine (CYMBALTA) 20 MG capsule Take 1 capsule (20 mg) by mouth daily 90 capsule 3    gabapentin (NEURONTIN) 100 MG capsule Take 100 mg by mouth 3 times daily      gabapentin (NEURONTIN) 100 MG capsule Take 1 capsule (100 mg) by mouth at bedtime 90 capsule 1    insulin glargine (LANTUS PEN) 100 UNIT/ML pen Inject 18 Units Subcutaneous every 24 hours 30 mL 3    insulin lispro (HUMALOG KWIKPEN) 100 UNIT/ML (1 unit dial) KWIKPEN LOW INSULIN RESISTANCE DOSING Do Not give Bedtime Correction Insulin if BG less than 225. For - 299 give 2 unit. For  - 399 give 4 units For BG greater than or equal 400 give 5 units.  Max dose is 11 units per day 15 mL 11    insulin pen needle (31G X 8 MM) 31G X 8 MM miscellaneous Use 4 pen needles daily or as directed. 300 each 4    levETIRAcetam (KEPPRA) 500 MG tablet Take 500 mg by mouth 2 times daily      levETIRAcetam (KEPPRA) 500 MG tablet Take 1 tablet (500 mg) by mouth 2 times daily 180 tablet 3    Lidocaine (LIDOCARE) 4 % Patch Place 1 patch onto the skin every 24 hours To prevent lidocaine toxicity, patient should be patch free for 12 hrs daily. 15 patch 0    loratadine (CLARITIN) 10 MG tablet Take 10 mg by mouth daily      loratadine (CLARITIN) 10 MG tablet Take 1 tablet (10 mg) by mouth daily 90 tablet 3    METHYLCELLULOSE, LAXATIVE, PO Take 1 Tablespoonful by mouth daily as needed      nystatin (MYCOSTATIN) 728703 UNIT/GM external cream Apply topically 2 times daily Until rash clear 30 g 3    prochlorperazine (COMPAZINE) 10 MG tablet Take 1 tablet (10 mg) by mouth every 6 hours as needed for nausea or vomiting 30 tablet 0    senna-docusate (SENOKOT-S/PERICOLACE) 8.6-50 MG tablet Take 1 tablet by mouth 2 times daily 12 tablet 0     SYNTHROID 75 MCG tablet Take 1 tablet (75 mcg) by mouth daily 90 tablet 3    Vitamin D3 (CHOLECALCIFEROL) 125 MCG (5000 UT) tablet Take 1 tablet (125 mcg) by mouth daily 90 tablet 3    zinc oxide (DESITIN) 20 % external ointment Apply topically as needed for dry skin or irritation 85 g 3     Allergies   Allergen Reactions    Seasonal Allergies      Recent Labs   Lab Test 03/07/24  0114 03/07/24  0112 03/07/24  0109 01/29/24  1655 10/16/23  1750 09/06/23  0609 09/05/23  0645 09/04/23  0228 06/18/23  0745 06/17/23  0655 05/21/23  0627 05/20/23  1312 03/29/23  1158 04/06/22  0407 04/06/22  0014 08/02/21  1009 07/07/21  0630 06/27/21  0759   A1C  --   --   --  10.7*  --   --   --  9.3*  --   --   --  10.8* 11.2*   < >  --    < >  --   --    LDL  --   --   --   --   --   --   --   --   --   --   --  71 72  --  39   < >  --   --    HDL  --   --   --   --   --   --   --   --   --   --   --  35* 48*  --  34*   < >  --   --    TRIG  --   --   --   --   --   --   --   --   --   --   --  152* 105  --  129   < >  --   --    ALT  --   --   --   --  24  --   --   --  24 23   < > 17 39*   < >  --    < >  --   --    CR  --  1.6* 1.50* 1.64* 1.66*   < > 1.56* 1.64* 1.25* 1.33*   < > 1.71*  1.71* 1.62*   < > 1.20*   < > 1.68* 1.48*   GFRESTIMATED  --  35* 38* 34* 34*   < > 36* 34* 48* 44*   < > 33*  33* 35*   < > 51*   < > 31* 37*   GFRESTBLACK  --   --   --   --   --   --   --   --   --   --   --   --   --   --   --   --  37* 43*   POTASSIUM 3.5  --  3.6 4.7 4.3   < > 4.0 4.3 4.2 4.3   < > 4.3 4.1   < >  --    < > 3.9 3.5   TSH  --   --   --   --  0.59  --  2.86  --   --   --    < >  --  2.38   < >  --    < >  --   --     < > = values in this interval not displayed.         Review of Systems  Problem list, PMH, Surgical HX,  SH, allergies, medications,immunizations reviewed and updated in Epic. ROS noted in HPI and ROS,staff / patient questionnaires reviewed by me.         Objective    /75 (BP Location: Right arm, Patient  Position: Sitting, Cuff Size: Adult Regular)   Pulse 74   Temp 97.8  F (36.6  C) (Oral)   SpO2 98%   There is no height or weight on file to calculate BMI.  Physical Exam   Constitutional: Sitting in a wheelchair, quiet allows her daughter to do the communication but responds to questioning.  Appears stable at her baseline chronic poor health, well-groomed, cooperative.  HENT: TM normal.  Right pinna pedunculated growth has been present for years without change.  Head: Normocephalic and atraumatic.   Mouth/Throat: hydrated,  dental decay.  Eyes: Conjunctivae and EOM are normal.No scleral icterus.   Neck:  Neck supple.  No tracheal deviation present. No thyromegaly present.   Cardiovascular: Regular rhythm, normal heart sounds, No murmur present.   Pulmonary/Chest: Effort normal and breath sounds normal. No respiratory distress.   Abdominal: Exam in chair soft obese. Bowel sounds are normal. No distension and no mass. No tenderness.   Musculoskeletal: Deconditioned upper and lower extremities.   No edema and no tenderness.  Neurological: Cooperative.  Signs of vascular dementia. Able to move bilateral upper and lower extremities   Skin: Left hand fourth digit slightly dilated vein, compresses with pressure, mild pallor, no ulcerations or bruising noted on exposed skin   Psychiatric: Cooperative, communicative through nods and a few words    Results for orders placed or performed in visit on 05/20/24   Hemoglobin A1c     Status: Abnormal   Result Value Ref Range    Hemoglobin A1C 10.2 (H) <5.7 %   TSH with free T4 reflex     Status: Normal   Result Value Ref Range    TSH 0.80 0.30 - 4.20 uIU/mL   Ferritin     Status: Normal   Result Value Ref Range    Ferritin 12 11 - 328 ng/mL   Comprehensive metabolic panel (BMP + Alb, Alk Phos, ALT, AST, Total. Bili, TP)     Status: Abnormal   Result Value Ref Range    Sodium 141 135 - 145 mmol/L    Potassium 5.1 3.4 - 5.3 mmol/L    Carbon Dioxide (CO2) 25 22 - 29 mmol/L     Anion Gap 8 7 - 15 mmol/L    Urea Nitrogen 40.4 (H) 8.0 - 23.0 mg/dL    Creatinine 1.63 (H) 0.51 - 0.95 mg/dL    GFR Estimate 35 (L) >60 mL/min/1.73m2    Calcium 9.2 8.8 - 10.2 mg/dL    Chloride 108 (H) 98 - 107 mmol/L    Glucose 242 (H) 70 - 99 mg/dL    Alkaline Phosphatase 150 40 - 150 U/L    AST 21 0 - 45 U/L    ALT 17 0 - 50 U/L    Protein Total 6.5 6.4 - 8.3 g/dL    Albumin 3.8 3.5 - 5.2 g/dL    Bilirubin Total 0.2 <=1.2 mg/dL   CBC with platelets and differential     Status: Abnormal   Result Value Ref Range    WBC Count 4.5 4.0 - 11.0 10e3/uL    RBC Count 3.98 3.80 - 5.20 10e6/uL    Hemoglobin 11.1 (L) 11.7 - 15.7 g/dL    Hematocrit 35.2 35.0 - 47.0 %    MCV 88 78 - 100 fL    MCH 27.9 26.5 - 33.0 pg    MCHC 31.5 31.5 - 36.5 g/dL    RDW 13.6 10.0 - 15.0 %    Platelet Count 201 150 - 450 10e3/uL    % Neutrophils 62 %    % Lymphocytes 30 %    % Monocytes 6 %    % Eosinophils 2 %    % Basophils 0 %    % Immature Granulocytes 0 %    NRBCs per 100 WBC 0 <1 /100    Absolute Neutrophils 2.8 1.6 - 8.3 10e3/uL    Absolute Lymphocytes 1.4 0.8 - 5.3 10e3/uL    Absolute Monocytes 0.3 0.0 - 1.3 10e3/uL    Absolute Eosinophils 0.1 0.0 - 0.7 10e3/uL    Absolute Basophils 0.0 0.0 - 0.2 10e3/uL    Absolute Immature Granulocytes 0.0 <=0.4 10e3/uL    Absolute NRBCs 0.0 10e3/uL   CBC with platelets and differential     Status: Abnormal    Narrative    The following orders were created for panel order CBC with platelets and differential.  Procedure                               Abnormality         Status                     ---------                               -----------         ------                     CBC with platelets and d...[[id number]]  Abnormal            Final result                 Please view results for these tests on the individual orders.    Reviewed after visit.  Signed Electronically by: Bony Castaneda MD

## 2024-05-20 NOTE — PATIENT INSTRUCTIONS
RSV and TDAP vaccine through local pharmacy    Shingrix two shot series 2 months apart through local pharmacy

## 2024-05-20 NOTE — LETTER
May 22, 2024      Isabell Matthews  [address]  [place] [address]        Dear ,    We are writing to inform you of your test results.    {results letter list:[id number]}    No results found from the In Basket message.    If you have any questions or concerns, please call the clinic at the number listed above.       Sincerely,

## 2024-05-21 NOTE — RESULT ENCOUNTER NOTE
Diabetes needs better control A1C 10.2 may need to adjust long acting insulin dose increase by 2 units.   Chronic renal impairment Stage 3 b renal impairment  make sure she is hydrating well with water.   Thyroid in range on current  Synthroid dose.  Slightly low ferritin increase iron rich food in diet.   Due for several vaccines through local pharmacy TDAP, RSV and two shot Shingrix vaccine.   I placed a referral for  eye exam.  Bony Castaneda MD

## 2024-05-22 ENCOUNTER — TELEPHONE (OUTPATIENT)
Dept: FAMILY MEDICINE | Facility: CLINIC | Age: 64
End: 2024-05-22
Payer: COMMERCIAL

## 2024-05-22 NOTE — TELEPHONE ENCOUNTER
Left Voicemail (1st Attempt) and Sent Mychart (1st Attempt) for the patient to call back and schedule the following:    Appointment type: eye referral  Provider: per PCP  Return date: any  Specialty phone number: [phone]     Additonal Notes: LVM on primary # per perm comments

## 2024-05-24 ENCOUNTER — TELEPHONE (OUTPATIENT)
Dept: OPHTHALMOLOGY | Facility: CLINIC | Age: 64
End: 2024-05-24
Payer: COMMERCIAL

## 2024-05-24 NOTE — TELEPHONE ENCOUNTER
LVM for patient scheduling a Diabetic Eye Exam per referral placed by Dr. Castaneda. Provided Eye Clinic number for scheduling options.     Also, sent a referral letter for scheduling options.

## 2024-05-28 ENCOUNTER — CARE COORDINATION (OUTPATIENT)
Dept: FAMILY MEDICINE | Facility: CLINIC | Age: 64
End: 2024-05-28
Payer: COMMERCIAL

## 2024-05-29 ENCOUNTER — TELEPHONE (OUTPATIENT)
Dept: INTERNAL MEDICINE | Facility: CLINIC | Age: 64
End: 2024-05-29
Payer: COMMERCIAL

## 2024-05-29 DIAGNOSIS — E10.59 TYPE 1 DIABETES MELLITUS WITH OTHER CIRCULATORY COMPLICATION (H): ICD-10-CM

## 2024-05-29 RX ORDER — INSULIN LISPRO 100 [IU]/ML
INJECTION, SOLUTION INTRAVENOUS; SUBCUTANEOUS
Qty: 15 ML | Refills: 11 | Status: ON HOLD | OUTPATIENT
Start: 2024-05-29 | End: 2024-06-18

## 2024-05-29 NOTE — TELEPHONE ENCOUNTER
"[facility] received a fax from [facility] ([address] in [place], [place] [id number]) regarding the patient's prescription for INSULIN LISPRO 100U/ML KWIKPEN 3ML, Q=15 with a message that stated, \"Hi, our system glitched and we did not receive the full complete direction for this prescription. [C]an we please get the full direction? [S]orry about that.\"      The RN called and spoke to the pharmacy. Per pharmacy staff, they did not receive the full sig dosing instructions for the above prescription. The RN verbally relayed to [facility] the sig dosing per Dr. Castaneda's prescription for insulin lispro (HUMALOG KWIKPEN) 100 UNIT/ML (1 unit dial) KWIKPEN written 5/20/2024 with sig instructions, \"Sig: LOW INSULIN RESISTANCE DOSING Do Not give Bedtime Correction Insulin if BG less than 225. For - 299 give 2 unit. For  - 399 give 4 units For BG greater than or equal 400 give 5 units add 1 unit per 30 gram carb Max dose is 11 units per day\"    RN refilled/re-sent rx for insulin lispro (HUMALOG KWIKPEN) 100 UNIT/ML (1 unit dial) KWIKPEN and [facility] confirmed they received this rx with max dose is 11 units per day and stated they would delete the prescription from 5/20/2024.   "

## 2024-06-16 ENCOUNTER — APPOINTMENT (OUTPATIENT)
Dept: CT IMAGING | Facility: CLINIC | Age: 64
DRG: 065 | End: 2024-06-16
Attending: EMERGENCY MEDICINE
Payer: COMMERCIAL

## 2024-06-16 ENCOUNTER — APPOINTMENT (OUTPATIENT)
Dept: MRI IMAGING | Facility: CLINIC | Age: 64
DRG: 065 | End: 2024-06-16
Attending: EMERGENCY MEDICINE
Payer: COMMERCIAL

## 2024-06-16 ENCOUNTER — HOSPITAL ENCOUNTER (INPATIENT)
Facility: CLINIC | Age: 64
LOS: 2 days | Discharge: HOME HEALTH | DRG: 065 | End: 2024-06-18
Attending: EMERGENCY MEDICINE | Admitting: STUDENT IN AN ORGANIZED HEALTH CARE EDUCATION/TRAINING PROGRAM
Payer: COMMERCIAL

## 2024-06-16 DIAGNOSIS — I63.9 CEREBROVASCULAR ACCIDENT (CVA), UNSPECIFIED MECHANISM (H): ICD-10-CM

## 2024-06-16 DIAGNOSIS — Z87.891 PERSONAL HISTORY OF TOBACCO USE, PRESENTING HAZARDS TO HEALTH: ICD-10-CM

## 2024-06-16 DIAGNOSIS — I10 ESSENTIAL HYPERTENSION, MALIGNANT: ICD-10-CM

## 2024-06-16 DIAGNOSIS — R41.89 COGNITIVE DECLINE: ICD-10-CM

## 2024-06-16 DIAGNOSIS — N39.0 URINARY TRACT INFECTION ASSOCIATED WITH CATHETERIZATION OF URINARY TRACT, UNSPECIFIED INDWELLING URINARY CATHETER TYPE, SUBSEQUENT ENCOUNTER: ICD-10-CM

## 2024-06-16 DIAGNOSIS — G45.9 TIA (TRANSIENT ISCHEMIC ATTACK): ICD-10-CM

## 2024-06-16 DIAGNOSIS — E10.59 TYPE 1 DIABETES MELLITUS WITH OTHER CIRCULATORY COMPLICATION (H): ICD-10-CM

## 2024-06-16 DIAGNOSIS — E11.649 TYPE 2 DIABETES MELLITUS WITH HYPOGLYCEMIA WITHOUT COMA, WITH LONG-TERM CURRENT USE OF INSULIN (H): Primary | ICD-10-CM

## 2024-06-16 DIAGNOSIS — Z87.440 HX: UTI (URINARY TRACT INFECTION): ICD-10-CM

## 2024-06-16 DIAGNOSIS — Z79.4 TYPE 2 DIABETES MELLITUS WITH HYPOGLYCEMIA WITHOUT COMA, WITH LONG-TERM CURRENT USE OF INSULIN (H): Primary | ICD-10-CM

## 2024-06-16 DIAGNOSIS — T83.511D URINARY TRACT INFECTION ASSOCIATED WITH CATHETERIZATION OF URINARY TRACT, UNSPECIFIED INDWELLING URINARY CATHETER TYPE, SUBSEQUENT ENCOUNTER: ICD-10-CM

## 2024-06-16 DIAGNOSIS — I63.9 ISCHEMIC STROKE (H): ICD-10-CM

## 2024-06-16 DIAGNOSIS — R29.701 NATIONAL INSTITUTES OF HEALTH STROKE SCALE (NIHSS) TOTAL SCORE 1: ICD-10-CM

## 2024-06-16 DIAGNOSIS — N30.00 ACUTE CYSTITIS WITHOUT HEMATURIA: ICD-10-CM

## 2024-06-16 LAB
ALBUMIN UR-MCNC: 20 MG/DL
ANION GAP SERPL CALCULATED.3IONS-SCNC: 9 MMOL/L (ref 7–15)
APPEARANCE UR: ABNORMAL
APTT PPP: 29 SECONDS (ref 22–38)
ATRIAL RATE - MUSE: 84 BPM
B-OH-BUTYR SERPL-SCNC: <0.18 MMOL/L
BACTERIA #/AREA URNS HPF: ABNORMAL /HPF
BASOPHILS # BLD AUTO: 0 10E3/UL (ref 0–0.2)
BASOPHILS NFR BLD AUTO: 0 %
BILIRUB UR QL STRIP: NEGATIVE
BUN SERPL-MCNC: 34.4 MG/DL (ref 8–23)
CALCIUM SERPL-MCNC: 8.8 MG/DL (ref 8.8–10.2)
CHLORIDE SERPL-SCNC: 107 MMOL/L (ref 98–107)
CHOLEST SERPL-MCNC: 134 MG/DL
COLOR UR AUTO: ABNORMAL
CREAT BLD-MCNC: 1.9 MG/DL (ref 0.5–1)
CREAT SERPL-MCNC: 1.73 MG/DL (ref 0.51–0.95)
DEPRECATED HCO3 PLAS-SCNC: 23 MMOL/L (ref 22–29)
DIASTOLIC BLOOD PRESSURE - MUSE: NORMAL MMHG
EGFRCR SERPLBLD CKD-EPI 2021: 29 ML/MIN/1.73M2
EGFRCR SERPLBLD CKD-EPI 2021: 32 ML/MIN/1.73M2
EOSINOPHIL # BLD AUTO: 0.1 10E3/UL (ref 0–0.7)
EOSINOPHIL NFR BLD AUTO: 2 %
ERYTHROCYTE [DISTWIDTH] IN BLOOD BY AUTOMATED COUNT: 14.6 % (ref 10–15)
GLUCOSE BLDC GLUCOMTR-MCNC: 126 MG/DL (ref 70–99)
GLUCOSE BLDC GLUCOMTR-MCNC: 143 MG/DL (ref 70–99)
GLUCOSE BLDC GLUCOMTR-MCNC: 154 MG/DL (ref 70–99)
GLUCOSE BLDC GLUCOMTR-MCNC: 355 MG/DL (ref 70–99)
GLUCOSE BLDC GLUCOMTR-MCNC: 90 MG/DL (ref 70–99)
GLUCOSE SERPL-MCNC: 368 MG/DL (ref 70–99)
GLUCOSE UR STRIP-MCNC: 1000 MG/DL
HCT VFR BLD AUTO: 34.4 % (ref 35–47)
HDLC SERPL-MCNC: 43 MG/DL
HGB BLD-MCNC: 10.9 G/DL (ref 11.7–15.7)
HGB UR QL STRIP: NEGATIVE
IMM GRANULOCYTES # BLD: 0 10E3/UL
IMM GRANULOCYTES NFR BLD: 0 %
INR PPP: 0.98 (ref 0.85–1.15)
INTERPRETATION ECG - MUSE: NORMAL
KETONES UR STRIP-MCNC: NEGATIVE MG/DL
LDLC SERPL CALC-MCNC: 67 MG/DL
LEUKOCYTE ESTERASE UR QL STRIP: ABNORMAL
LYMPHOCYTES # BLD AUTO: 1.6 10E3/UL (ref 0.8–5.3)
LYMPHOCYTES NFR BLD AUTO: 36 %
MCH RBC QN AUTO: 28.2 PG (ref 26.5–33)
MCHC RBC AUTO-ENTMCNC: 31.7 G/DL (ref 31.5–36.5)
MCV RBC AUTO: 89 FL (ref 78–100)
MONOCYTES # BLD AUTO: 0.3 10E3/UL (ref 0–1.3)
MONOCYTES NFR BLD AUTO: 6 %
MUCOUS THREADS #/AREA URNS LPF: PRESENT /LPF
NEUTROPHILS # BLD AUTO: 2.6 10E3/UL (ref 1.6–8.3)
NEUTROPHILS NFR BLD AUTO: 56 %
NITRATE UR QL: POSITIVE
NONHDLC SERPL-MCNC: 91 MG/DL
NRBC # BLD AUTO: 0 10E3/UL
NRBC BLD AUTO-RTO: 0 /100
P AXIS - MUSE: 32 DEGREES
PH UR STRIP: 5.5 [PH] (ref 5–7)
PLATELET # BLD AUTO: 180 10E3/UL (ref 150–450)
POTASSIUM SERPL-SCNC: 4.2 MMOL/L (ref 3.4–5.3)
PR INTERVAL - MUSE: 146 MS
QRS DURATION - MUSE: 88 MS
QT - MUSE: 384 MS
QTC - MUSE: 453 MS
R AXIS - MUSE: 25 DEGREES
RBC # BLD AUTO: 3.86 10E6/UL (ref 3.8–5.2)
RBC URINE: 2 /HPF
SODIUM SERPL-SCNC: 139 MMOL/L (ref 135–145)
SP GR UR STRIP: 1.01 (ref 1–1.03)
SQUAMOUS EPITHELIAL: 3 /HPF
SYSTOLIC BLOOD PRESSURE - MUSE: NORMAL MMHG
T AXIS - MUSE: 15 DEGREES
TRIGL SERPL-MCNC: 119 MG/DL
TROPONIN T SERPL HS-MCNC: 31 NG/L
TROPONIN T SERPL HS-MCNC: 32 NG/L
TROPONIN T SERPL HS-MCNC: 33 NG/L
UROBILINOGEN UR STRIP-MCNC: NORMAL MG/DL
VENTRICULAR RATE- MUSE: 84 BPM
WBC # BLD AUTO: 4.5 10E3/UL (ref 4–11)
WBC URINE: 25 /HPF

## 2024-06-16 PROCEDURE — 93005 ELECTROCARDIOGRAM TRACING: CPT | Performed by: EMERGENCY MEDICINE

## 2024-06-16 PROCEDURE — 81001 URINALYSIS AUTO W/SCOPE: CPT | Performed by: EMERGENCY MEDICINE

## 2024-06-16 PROCEDURE — 85025 COMPLETE CBC W/AUTO DIFF WBC: CPT | Performed by: EMERGENCY MEDICINE

## 2024-06-16 PROCEDURE — 84484 ASSAY OF TROPONIN QUANT: CPT

## 2024-06-16 PROCEDURE — 250N000011 HC RX IP 250 OP 636: Mod: JZ | Performed by: EMERGENCY MEDICINE

## 2024-06-16 PROCEDURE — 84484 ASSAY OF TROPONIN QUANT: CPT | Performed by: EMERGENCY MEDICINE

## 2024-06-16 PROCEDURE — 250N000013 HC RX MED GY IP 250 OP 250 PS 637

## 2024-06-16 PROCEDURE — 85610 PROTHROMBIN TIME: CPT | Performed by: EMERGENCY MEDICINE

## 2024-06-16 PROCEDURE — 258N000003 HC RX IP 258 OP 636: Mod: JZ | Performed by: EMERGENCY MEDICINE

## 2024-06-16 PROCEDURE — 70547 MR ANGIOGRAPHY NECK W/O DYE: CPT | Mod: 26 | Performed by: RADIOLOGY

## 2024-06-16 PROCEDURE — 70547 MR ANGIOGRAPHY NECK W/O DYE: CPT

## 2024-06-16 PROCEDURE — 85730 THROMBOPLASTIN TIME PARTIAL: CPT | Performed by: EMERGENCY MEDICINE

## 2024-06-16 PROCEDURE — 99291 CRITICAL CARE FIRST HOUR: CPT | Performed by: EMERGENCY MEDICINE

## 2024-06-16 PROCEDURE — 87086 URINE CULTURE/COLONY COUNT: CPT | Performed by: EMERGENCY MEDICINE

## 2024-06-16 PROCEDURE — 99221 1ST HOSP IP/OBS SF/LOW 40: CPT | Mod: GC | Performed by: PSYCHIATRY & NEUROLOGY

## 2024-06-16 PROCEDURE — 70450 CT HEAD/BRAIN W/O DYE: CPT

## 2024-06-16 PROCEDURE — 36415 COLL VENOUS BLD VENIPUNCTURE: CPT | Performed by: EMERGENCY MEDICINE

## 2024-06-16 PROCEDURE — 250N000011 HC RX IP 250 OP 636: Mod: JZ

## 2024-06-16 PROCEDURE — 82962 GLUCOSE BLOOD TEST: CPT

## 2024-06-16 PROCEDURE — 96365 THER/PROPH/DIAG IV INF INIT: CPT | Performed by: EMERGENCY MEDICINE

## 2024-06-16 PROCEDURE — 80061 LIPID PANEL: CPT | Performed by: EMERGENCY MEDICINE

## 2024-06-16 PROCEDURE — 70450 CT HEAD/BRAIN W/O DYE: CPT | Mod: 26 | Performed by: RADIOLOGY

## 2024-06-16 PROCEDURE — 96361 HYDRATE IV INFUSION ADD-ON: CPT | Performed by: EMERGENCY MEDICINE

## 2024-06-16 PROCEDURE — 36415 COLL VENOUS BLD VENIPUNCTURE: CPT

## 2024-06-16 PROCEDURE — 70551 MRI BRAIN STEM W/O DYE: CPT

## 2024-06-16 PROCEDURE — 93010 ELECTROCARDIOGRAM REPORT: CPT | Performed by: EMERGENCY MEDICINE

## 2024-06-16 PROCEDURE — 80048 BASIC METABOLIC PNL TOTAL CA: CPT | Performed by: EMERGENCY MEDICINE

## 2024-06-16 PROCEDURE — 82565 ASSAY OF CREATININE: CPT

## 2024-06-16 PROCEDURE — 82010 KETONE BODYS QUAN: CPT | Performed by: EMERGENCY MEDICINE

## 2024-06-16 PROCEDURE — 120N000002 HC R&B MED SURG/OB UMMC

## 2024-06-16 PROCEDURE — 70544 MR ANGIOGRAPHY HEAD W/O DYE: CPT

## 2024-06-16 PROCEDURE — 99291 CRITICAL CARE FIRST HOUR: CPT | Mod: 25 | Performed by: EMERGENCY MEDICINE

## 2024-06-16 PROCEDURE — 70551 MRI BRAIN STEM W/O DYE: CPT | Mod: 26 | Performed by: RADIOLOGY

## 2024-06-16 RX ORDER — DEXTROSE MONOHYDRATE 25 G/50ML
25-50 INJECTION, SOLUTION INTRAVENOUS
Status: DISCONTINUED | OUTPATIENT
Start: 2024-06-16 | End: 2024-06-17

## 2024-06-16 RX ORDER — HEPARIN SODIUM 5000 [USP'U]/.5ML
5000 INJECTION, SOLUTION INTRAVENOUS; SUBCUTANEOUS EVERY 8 HOURS SCHEDULED
Status: DISCONTINUED | OUTPATIENT
Start: 2024-06-16 | End: 2024-06-18 | Stop reason: HOSPADM

## 2024-06-16 RX ORDER — AMLODIPINE BESYLATE 5 MG/1
5 TABLET ORAL DAILY
Status: DISCONTINUED | OUTPATIENT
Start: 2024-06-16 | End: 2024-06-16

## 2024-06-16 RX ORDER — AMLODIPINE BESYLATE 5 MG/1
5 TABLET ORAL DAILY
Status: DISCONTINUED | OUTPATIENT
Start: 2024-06-17 | End: 2024-06-18 | Stop reason: HOSPADM

## 2024-06-16 RX ORDER — CEFDINIR 300 MG/1
300 CAPSULE ORAL DAILY
Qty: 7 CAPSULE | Refills: 0 | Status: SHIPPED | OUTPATIENT
Start: 2024-06-16 | End: 2024-06-18

## 2024-06-16 RX ORDER — LEVETIRACETAM 5 MG/ML
500 INJECTION INTRAVASCULAR 2 TIMES DAILY
Status: DISCONTINUED | OUTPATIENT
Start: 2024-06-16 | End: 2024-06-18

## 2024-06-16 RX ORDER — LIDOCAINE 40 MG/G
CREAM TOPICAL
Status: DISCONTINUED | OUTPATIENT
Start: 2024-06-16 | End: 2024-06-18 | Stop reason: HOSPADM

## 2024-06-16 RX ORDER — CEFTRIAXONE 1 G/1
1 INJECTION, POWDER, FOR SOLUTION INTRAMUSCULAR; INTRAVENOUS ONCE
Status: COMPLETED | OUTPATIENT
Start: 2024-06-16 | End: 2024-06-16

## 2024-06-16 RX ORDER — DULOXETIN HYDROCHLORIDE 20 MG/1
20 CAPSULE, DELAYED RELEASE ORAL DAILY
Status: DISCONTINUED | OUTPATIENT
Start: 2024-06-16 | End: 2024-06-18 | Stop reason: HOSPADM

## 2024-06-16 RX ORDER — CARVEDILOL 12.5 MG/1
12.5 TABLET ORAL 2 TIMES DAILY WITH MEALS
Status: DISCONTINUED | OUTPATIENT
Start: 2024-06-16 | End: 2024-06-16

## 2024-06-16 RX ORDER — NICOTINE POLACRILEX 4 MG
15-30 LOZENGE BUCCAL
Status: DISCONTINUED | OUTPATIENT
Start: 2024-06-16 | End: 2024-06-17

## 2024-06-16 RX ORDER — ATORVASTATIN CALCIUM 40 MG/1
40 TABLET, FILM COATED ORAL DAILY
Status: DISCONTINUED | OUTPATIENT
Start: 2024-06-17 | End: 2024-06-18 | Stop reason: HOSPADM

## 2024-06-16 RX ORDER — CARVEDILOL 12.5 MG/1
12.5 TABLET ORAL 2 TIMES DAILY WITH MEALS
Status: DISCONTINUED | OUTPATIENT
Start: 2024-06-17 | End: 2024-06-17

## 2024-06-16 RX ORDER — HYDRALAZINE HYDROCHLORIDE 20 MG/ML
10-20 INJECTION INTRAMUSCULAR; INTRAVENOUS
Status: DISCONTINUED | OUTPATIENT
Start: 2024-06-16 | End: 2024-06-18 | Stop reason: HOSPADM

## 2024-06-16 RX ORDER — ASPIRIN 300 MG/1
300 SUPPOSITORY RECTAL ONCE
Qty: 1 SUPPOSITORY | Refills: 0 | Status: COMPLETED | OUTPATIENT
Start: 2024-06-16 | End: 2024-06-16

## 2024-06-16 RX ORDER — LABETALOL HYDROCHLORIDE 5 MG/ML
10-20 INJECTION, SOLUTION INTRAVENOUS EVERY 10 MIN PRN
Status: DISCONTINUED | OUTPATIENT
Start: 2024-06-16 | End: 2024-06-18 | Stop reason: HOSPADM

## 2024-06-16 RX ORDER — GABAPENTIN 100 MG/1
100 CAPSULE ORAL AT BEDTIME
Status: DISCONTINUED | OUTPATIENT
Start: 2024-06-16 | End: 2024-06-18 | Stop reason: HOSPADM

## 2024-06-16 RX ORDER — LEVOTHYROXINE SODIUM 75 UG/1
75 TABLET ORAL DAILY
Status: DISCONTINUED | OUTPATIENT
Start: 2024-06-16 | End: 2024-06-18 | Stop reason: HOSPADM

## 2024-06-16 RX ORDER — ATORVASTATIN CALCIUM 40 MG/1
40 TABLET, FILM COATED ORAL DAILY
Status: DISCONTINUED | OUTPATIENT
Start: 2024-06-17 | End: 2024-06-16

## 2024-06-16 RX ADMIN — LEVETIRACETAM 500 MG: 5 INJECTION INTRAVENOUS at 19:42

## 2024-06-16 RX ADMIN — SODIUM CHLORIDE 500 ML: 9 INJECTION, SOLUTION INTRAVENOUS at 08:46

## 2024-06-16 RX ADMIN — CEFTRIAXONE SODIUM 1 G: 1 INJECTION, POWDER, FOR SOLUTION INTRAMUSCULAR; INTRAVENOUS at 15:49

## 2024-06-16 RX ADMIN — ASPIRIN 300 MG: 300 SUPPOSITORY RECTAL at 19:26

## 2024-06-16 RX ADMIN — HEPARIN SODIUM 5000 UNITS: 5000 INJECTION, SOLUTION INTRAVENOUS; SUBCUTANEOUS at 21:55

## 2024-06-16 RX ADMIN — SODIUM CHLORIDE 500 ML: 9 INJECTION, SOLUTION INTRAVENOUS at 11:58

## 2024-06-16 ASSESSMENT — ACTIVITIES OF DAILY LIVING (ADL)
ADLS_ACUITY_SCORE: 38
ADLS_ACUITY_SCORE: 38
ADLS_ACUITY_SCORE: 42
ADLS_ACUITY_SCORE: 38
ADLS_ACUITY_SCORE: 42
ADLS_ACUITY_SCORE: 38
ADLS_ACUITY_SCORE: 38
ADLS_ACUITY_SCORE: 42
ADLS_ACUITY_SCORE: 38

## 2024-06-16 NOTE — ED PROVIDER NOTES
ED Provider Note  [facility]      History     Chief Complaint   Patient presents with    Stroke Symptoms     HPI  Isabell Matthews is a 63 year old female with history of type 1 diabetes, diabetic polyneuropathy, dementia, seizures, acute ischemic stroke on Plavix, hypertension, hyperlipidemia, chronic kidney disease who is to the emergency department via EMS for evaluation of left-sided facial droop and right-sided weakness/incoordination.  Patient awoke this morning at around 745.  Her daughter noticed that her left face was drooping and that she was drooling.  She also had difficulties talking.  Additionally, patient had difficulty with her right upper extremity and lower extremity when attempting to walk with her walker.  The daughter called EMS but did note that her symptoms were rapidly improving upon EMS arrival.  EMS did find some deficits as described above but noted that the improved during transportation to the emergency department.  Patient denies any headache.  No recent fall.  No chest pain or dyspnea.  No abdominal pain.  No fever.  No dysuria, urgency, or frequency.    Past Medical History  Past Medical History:   Diagnosis Date    Chronic pain     Coronary atherosclerosis 6/14/2016    Essential hypertension     Ex-cigarette smoker     quit 7/2018 over 35 years    Ex-cigarette smoker     Hyperlipidemia     Hypothyroid     Seizures (H)     Stroke (H)     Vascular dementia (H)      Past Surgical History:   Procedure Laterality Date    athroplasty hip Bilateral     2003, 2006    OTHER SURGICAL HISTORY      athroplasty hip    PICC DOUBLE LUMEN PLACEMENT Right 06/11/2023    right basilic 5 fr dl power picc 39 cm    STENT       acetaminophen (TYLENOL) 325 MG tablet  amLODIPine (NORVASC) 5 MG tablet  aspirin (ASA) 81 MG chewable tablet  atorvastatin (LIPITOR) 40 MG tablet  blood glucose (TRUE METRIX BLOOD GLUCOSE TEST) test strip  blood glucose monitoring (NO BRAND SPECIFIED) meter  device kit  carvedilol (COREG) 12.5 MG tablet  Continuous Blood Gluc Sensor (FREESTYLE MARIA FERNANDA 2 SENSOR) MISC  cyanocobalamin (VITAMIN B-12) 1000 MCG tablet  cyclobenzaprine (FLEXERIL) 10 MG tablet  diclofenac (VOLTAREN) 1 % topical gel  DULoxetine (CYMBALTA) 20 MG capsule  gabapentin (NEURONTIN) 100 MG capsule  insulin glargine (LANTUS PEN) 100 UNIT/ML pen  insulin lispro (HUMALOG KWIKPEN) 100 UNIT/ML (1 unit dial) KWIKPEN  insulin pen needle (31G X 8 MM) 31G X 8 MM miscellaneous  levETIRAcetam (KEPPRA) 500 MG tablet  Lidocaine (LIDOCARE) 4 % Patch  loratadine (CLARITIN) 10 MG tablet  METHYLCELLULOSE, LAXATIVE, PO  nystatin (MYCOSTATIN) 816702 UNIT/GM external cream  polyethylene glycol (MIRALAX) 17 GM/Dose powder  psyllium (METAMUCIL/KONSYL) capsule  SYNTHROID 75 MCG tablet  Vitamin D3 (CHOLECALCIFEROL) 125 MCG (5000 UT) tablet  zinc oxide (DESITIN) 20 % external ointment      Allergies   Allergen Reactions    Seasonal Allergies      Family History  Family History   Problem Relation Age of Onset    Acute Myocardial Infarction Father     Heart Disease Maternal Grandmother     Dementia Maternal Grandmother     Myocardial Infarction Father     Dementia Paternal Grandmother     Heart Disease Paternal Grandmother      Social History   Social History     Tobacco Use    Smoking status: Former     Current packs/day: 0.00     Average packs/day: 0.5 packs/day for 35.0 years (17.5 ttl pk-yrs)     Types: Cigarettes     Start date: 1983     Quit date: 2018     Years since quittin.9    Smokeless tobacco: Never   Substance Use Topics    Alcohol use: Not Currently    Drug use: Not Currently      A medically appropriate review of systems was performed with pertinent positives and negatives noted in the HPI, and all other systems negative.    Physical Exam   BP: (!) 165/85  Pulse: 86  Temp: 97.6  F (36.4  C)  Resp: 16  SpO2: 96 %  Physical Exam  Vitals and nursing note reviewed.   Constitutional:       General: She is not  in acute distress.     Appearance: Normal appearance. She is not diaphoretic.   HENT:      Head: Normocephalic and atraumatic.   Eyes:      Extraocular Movements: Extraocular movements intact.      Conjunctiva/sclera: Conjunctivae normal.   Cardiovascular:      Rate and Rhythm: Normal rate.      Heart sounds: Normal heart sounds.   Pulmonary:      Effort: Pulmonary effort is normal. No respiratory distress.      Breath sounds: Normal breath sounds.   Abdominal:      Palpations: Abdomen is soft.      Tenderness: There is no abdominal tenderness.   Musculoskeletal:         General: No tenderness. Normal range of motion.      Cervical back: Normal range of motion and neck supple.   Skin:     General: Skin is warm.      Findings: No rash.   Neurological:      General: No focal deficit present.      Mental Status: She is alert.      Cranial Nerves: No cranial nerve deficit.      Motor: No weakness.      Coordination: Coordination normal.           ED Course, Procedures, & Data      Procedures         EKG Interpretation:      Interpreted by DANIELLE VELASQUEZ MD, MD  Time reviewed:0836   Symptoms at time of EKG: stroke   Rhythm: Normal sinus   Rate: 84  Axis: Normal  Ectopy: None  Conduction: Normal  ST Segments/ T Waves: No ST-T wave changes and No acute ischemic changes  Q Waves: Lateral leads and Inferior leads  Comparison to prior: Unchanged from 3/7/24    Clinical Impression: no acute changes        The patient has stroke symptoms:         ED Stroke specific documentation           NIHSS PDF     Patient last known well time: 2200 yesterday  ED Provider first to bedside at: 0815  CT Results received at: 1100    Thrombolytics:   Not given.  Rapidly resolving symptoms    If treating with thrombolytics: Ensure SBP<180 and DBP<105 prior to treatment with thrombolytics.  Administering thrombolytics after treatment with IV labetalol, hydralazine, or nicardipine is reasonable once BP control is established.    Endovascular  Retrieval:  Not initiated due to absence of proximal vessel occlusion    National Institutes of Health Stroke Scale (Baseline)  Time Performed: 0815     Score    Level of consciousness: (0)   Alert, keenly responsive    LOC questions: (0)   Answers both questions correctly    LOC commands: (0)   Performs both tasks correctly    Best gaze: (0)   Normal    Visual: (0)   No visual loss    Facial palsy: (0)   Normal symmetrical movements    Motor arm (left): (0)   No drift    Motor arm (right): (0)   No drift    Motor leg (left): (0)   No drift    Motor leg (right): (0)   No drift    Limb ataxia: (0)   Absent    Sensory: (0)   Normal- no sensory loss    Best language: (0)   Normal- no aphasia    Dysarthria: (1)   Mild to moderate dysarthria    Extinction and inattention: (0)   No abnormality        Total Score:  1        Stroke Mimics were considered (including migraine headache, seizure disorder, hypoglycemia (or hyperglycemia), head or spinal trauma, CNS infection, Toxin ingestion and shock state (e.g. sepsis) .            Results for orders placed or performed during the hospital encounter of 06/16/24   CT Head w/o Contrast     Status: None    Narrative    EXAM: CT HEAD W/O CONTRAST  6/16/2024 10:15 AM     HISTORY: Acute neuro deficit, stroke/TIA suspected       COMPARISON: CT head 3/7/2024, MRI of 3/7/2024.    TECHNIQUE: Using multidetector thin collimation helical acquisition  technique, axial, coronal and sagittal CT images from the skull base  to the vertex were obtained without intravenous contrast.   (topogram) image(s) also obtained and reviewed.    FINDINGS:  No acute intracranial hemorrhage, mass effect, or midline shift.  Stable encephalomalacia involving the bilateral external capsules,  left greater than right. No acute loss of gray-white matter  differentiation in the cerebral hemispheres. Ventricles are  proportionate to the cerebral sulci. Clear basal cisterns. Patchy and  confluent  hypoattenuation within the the periventricular and  subcortical cerebral white matter, as well as in the chuck likely  secondary to chronic small vessel ischemic disease.    The bony calvaria and the bones of the skull base are normal. The  visualized portions of the paranasal sinuses and mastoid air cells are  clear. Bilateral pseudophakia.       Impression    IMPRESSION:   1. No acute intracranial pathology.   2. Stable multifocal encephalomalacia and leukoaraiosis.    I have personally reviewed the examination and initial interpretation  and I agree with the findings.    GREG QUEVEDO MD         SYSTEM ID:  [id number]   Basic metabolic panel     Status: Abnormal   Result Value Ref Range    Sodium 139 135 - 145 mmol/L    Potassium 4.2 3.4 - 5.3 mmol/L    Chloride 107 98 - 107 mmol/L    Carbon Dioxide (CO2) 23 22 - 29 mmol/L    Anion Gap 9 7 - 15 mmol/L    Urea Nitrogen 34.4 (H) 8.0 - 23.0 mg/dL    Creatinine 1.73 (H) 0.51 - 0.95 mg/dL    GFR Estimate 32 (L) >60 mL/min/1.73m2    Calcium 8.8 8.8 - 10.2 mg/dL    Glucose 368 (H) 70 - 99 mg/dL   INR     Status: Normal   Result Value Ref Range    INR 0.98 0.85 - 1.15   Partial thromboplastin time     Status: Normal   Result Value Ref Range    aPTT 29 22 - 38 Seconds   Troponin T, High Sensitivity     Status: Abnormal   Result Value Ref Range    Troponin T, High Sensitivity 33 (H) <=14 ng/L   UA with Microscopic reflex to Culture     Status: Abnormal    Specimen: Urine, Straight Catheter   Result Value Ref Range    Color Urine Light Yellow Colorless, Straw, Light Yellow, Yellow    Appearance Urine Slightly Cloudy (A) Clear    Glucose Urine 1000 (A) Negative mg/dL    Bilirubin Urine Negative Negative    Ketones Urine Negative Negative mg/dL    Specific Gravity Urine 1.014 1.003 - 1.035    Blood Urine Negative Negative    pH Urine 5.5 5.0 - 7.0    Protein Albumin Urine 20 (A) Negative mg/dL    Urobilinogen Urine Normal Normal, 2.0 mg/dL    Nitrite Urine Positive (A)  Negative    Leukocyte Esterase Urine Large (A) Negative    Bacteria Urine Moderate (A) None Seen /HPF    Mucus Urine Present (A) None Seen /LPF    RBC Urine 2 <=2 /HPF    WBC Urine 25 (H) <=5 /HPF    Squamous Epithelials Urine 3 (H) <=1 /HPF    Narrative    Urine Culture ordered based on laboratory criteria   Glucose by meter     Status: Abnormal   Result Value Ref Range    GLUCOSE BY METER POCT 355 (H) 70 - 99 mg/dL   CBC with platelets and differential     Status: Abnormal   Result Value Ref Range    WBC Count 4.5 4.0 - 11.0 10e3/uL    RBC Count 3.86 3.80 - 5.20 10e6/uL    Hemoglobin 10.9 (L) 11.7 - 15.7 g/dL    Hematocrit 34.4 (L) 35.0 - 47.0 %    MCV 89 78 - 100 fL    MCH 28.2 26.5 - 33.0 pg    MCHC 31.7 31.5 - 36.5 g/dL    RDW 14.6 10.0 - 15.0 %    Platelet Count 180 150 - 450 10e3/uL    % Neutrophils 56 %    % Lymphocytes 36 %    % Monocytes 6 %    % Eosinophils 2 %    % Basophils 0 %    % Immature Granulocytes 0 %    NRBCs per 100 WBC 0 <1 /100    Absolute Neutrophils 2.6 1.6 - 8.3 10e3/uL    Absolute Lymphocytes 1.6 0.8 - 5.3 10e3/uL    Absolute Monocytes 0.3 0.0 - 1.3 10e3/uL    Absolute Eosinophils 0.1 0.0 - 0.7 10e3/uL    Absolute Basophils 0.0 0.0 - 0.2 10e3/uL    Absolute Immature Granulocytes 0.0 <=0.4 10e3/uL    Absolute NRBCs 0.0 10e3/uL   Creatinine POCT     Status: Abnormal   Result Value Ref Range    Creatinine POCT 1.9 (H) 0.5 - 1.0 mg/dL    GFR, ESTIMATED POCT 29 (L) >60 mL/min/1.73m2   Lipid panel reflex to direct LDL     Status: Abnormal   Result Value Ref Range    Cholesterol 134 <200 mg/dL    Triglycerides 119 <150 mg/dL    Direct Measure HDL 43 (L) >=50 mg/dL    LDL Cholesterol Calculated 67 <=100 mg/dL    Non HDL Cholesterol 91 <130 mg/dL    Narrative    Cholesterol  Desirable:  <200 mg/dL    Triglycerides  Normal:  Less than 150 mg/dL  Borderline High:  150-199 mg/dL  High:  200-499 mg/dL  Very High:  Greater than or equal to 500 mg/dL    Direct Measure HDL  Female:  Greater than or equal  to 50 mg/dL   Male:  Greater than or equal to 40 mg/dL    LDL Cholesterol  Desirable:  <100mg/dL  Above Desirable:  100-129 mg/dL   Borderline High:  130-159 mg/dL   High:  160-189 mg/dL   Very High:  >= 190 mg/dL    Non HDL Cholesterol  Desirable:  130 mg/dL  Above Desirable:  130-159 mg/dL  Borderline High:  160-189 mg/dL  High:  190-219 mg/dL  Very High:  Greater than or equal to 220 mg/dL   Ketone Beta-Hydroxybutyrate Quantitative     Status: Normal   Result Value Ref Range    Ketone (Beta-Hydroxybutyrate) Quantitative <0.18 <=0.30 mmol/L   Troponin T, High Sensitivity     Status: Abnormal   Result Value Ref Range    Troponin T, High Sensitivity 31 (H) <=14 ng/L   Glucose by meter     Status: Abnormal   Result Value Ref Range    GLUCOSE BY METER POCT 143 (H) 70 - 99 mg/dL   Glucose by meter     Status: Abnormal   Result Value Ref Range    GLUCOSE BY METER POCT 154 (H) 70 - 99 mg/dL   EKG 12-lead, tracing only     Status: None   Result Value Ref Range    Systolic Blood Pressure  mmHg    Diastolic Blood Pressure  mmHg    Ventricular Rate 84 BPM    Atrial Rate 84 BPM    LA Interval 146 ms    QRS Duration 88 ms     ms    QTc 453 ms    P Axis 32 degrees    R AXIS 25 degrees    T Axis 15 degrees    Interpretation ECG       Sinus rhythm  Possible Lateral infarct , age undetermined  Inferior infarct , age undetermined  Abnormal ECG  Unconfirmed report - interpretation of this ECG is computer generated - see medical record for final interpretation    Confirmed by - EMERGENCY ROOM, PHYSICIAN ([id number]),  CARSON PAREDES ([id number]) on 6/16/2024 8:56:30 AM     CBC with Platelets & Differential     Status: Abnormal    Narrative    The following orders were created for panel order CBC with Platelets & Differential.  Procedure                               Abnormality         Status                     ---------                               -----------         ------                     CBC with platelets and d...[[id number]]   Abnormal            Final result                 Please view results for these tests on the individual orders.     Medications   cefTRIAXone (ROCEPHIN) 1 g vial to attach to  mL bag for ADULTS or NS 50 mL bag for PEDS (has no administration in time range)   sodium chloride 0.9% BOLUS 500 mL (0 mLs Intravenous Stopped 6/16/24 1031)   sodium chloride 0.9% BOLUS 500 mL (500 mLs Intravenous $New Bag 6/16/24 1158)     Labs Ordered and Resulted from Time of ED Arrival to Time of ED Departure   BASIC METABOLIC PANEL - Abnormal       Result Value    Sodium 139      Potassium 4.2      Chloride 107      Carbon Dioxide (CO2) 23      Anion Gap 9      Urea Nitrogen 34.4 (*)     Creatinine 1.73 (*)     GFR Estimate 32 (*)     Calcium 8.8      Glucose 368 (*)    TROPONIN T, HIGH SENSITIVITY - Abnormal    Troponin T, High Sensitivity 33 (*)    ROUTINE UA WITH MICROSCOPIC REFLEX TO CULTURE - Abnormal    Color Urine Light Yellow      Appearance Urine Slightly Cloudy (*)     Glucose Urine 1000 (*)     Bilirubin Urine Negative      Ketones Urine Negative      Specific Gravity Urine 1.014      Blood Urine Negative      pH Urine 5.5      Protein Albumin Urine 20 (*)     Urobilinogen Urine Normal      Nitrite Urine Positive (*)     Leukocyte Esterase Urine Large (*)     Bacteria Urine Moderate (*)     Mucus Urine Present (*)     RBC Urine 2      WBC Urine 25 (*)     Squamous Epithelials Urine 3 (*)    GLUCOSE BY METER - Abnormal    GLUCOSE BY METER POCT 355 (*)    CBC WITH PLATELETS AND DIFFERENTIAL - Abnormal    WBC Count 4.5      RBC Count 3.86      Hemoglobin 10.9 (*)     Hematocrit 34.4 (*)     MCV 89      MCH 28.2      MCHC 31.7      RDW 14.6      Platelet Count 180      % Neutrophils 56      % Lymphocytes 36      % Monocytes 6      % Eosinophils 2      % Basophils 0      % Immature Granulocytes 0      NRBCs per 100 WBC 0      Absolute Neutrophils 2.6      Absolute Lymphocytes 1.6      Absolute Monocytes 0.3      Absolute  Eosinophils 0.1      Absolute Basophils 0.0      Absolute Immature Granulocytes 0.0      Absolute NRBCs 0.0     ISTAT CREATININE POCT - Abnormal    Creatinine POCT 1.9 (*)     GFR, ESTIMATED POCT 29 (*)    LIPID REFLEX TO DIRECT LDL PANEL - Abnormal    Cholesterol 134      Triglycerides 119      Direct Measure HDL 43 (*)     LDL Cholesterol Calculated 67      Non HDL Cholesterol 91     TROPONIN T, HIGH SENSITIVITY - Abnormal    Troponin T, High Sensitivity 31 (*)    GLUCOSE BY METER - Abnormal    GLUCOSE BY METER POCT 143 (*)    GLUCOSE BY METER - Abnormal    GLUCOSE BY METER POCT 154 (*)    INR - Normal    INR 0.98     PARTIAL THROMBOPLASTIN TIME - Normal    aPTT 29     KETONE BETA-HYDROXYBUTYRATE QUANTITATIVE, RAPID - Normal    Ketone (Beta-Hydroxybutyrate) Quantitative <0.18     GLUCOSE MONITOR NURSING POCT   GLUCOSE MONITOR NURSING POCT   GLUCOSE MONITOR NURSING POCT   ISTAT CREATININE POCT   URINE CULTURE     CT Head w/o Contrast   Final Result   IMPRESSION:    1. No acute intracranial pathology.    2. Stable multifocal encephalomalacia and leukoaraiosis.      I have personally reviewed the examination and initial interpretation   and I agree with the findings.      GREG QUEVEDO MD            SYSTEM ID:  [id number]      MR Brain w/o Contrast    (Results Pending)   MRA Brain ([place]) wo Contrast    (Results Pending)   MRA Neck (Carotids) wo Contrast    (Results Pending)          Critical Care Addendum  My initial assessment, based on my review of prehospital provider report, review of vital signs, focused history, and physical exam, established a high suspicion that Isabell Matthews has ischemic or hemorrhagic stroke, which requires immediate intervention, and therefore she is critically ill.     After the initial assessment, the care team initiated IV fluid administration and initiated medication therapy with ceftriaxone  to provide stabilization care. Due to the critical nature of this patient, I  reassessed mental status and neurologic status multiple times prior to her disposition.     Time also spent performing documentation, reviewing test results, discussion with consultants, and coordination of care.     Critical care time (excluding teaching time and procedures): 30 minutes.       Assessment & Plan    63 year old male with history of diabetes and previous stroke to the emergency room with left-sided facial droop and right-sided weakness this morning upon awakening.  His symptoms are rapidly resolving and had normalized by the time she came to the emergency department.  She has mild confusion.  Patient was significantly hyperglycemic with a blood sugar over 400 in the prehospital phase.  It was in the mid 300s here but normalized with IV fluid administration.  Her urine appears infected.  Ceftriaxone given.  MRI brain and MRA brain and neck pending.  Neuro stroke to complete evaluation.  If no stroke, patient may require overnight observation admission to medicine if her mental status is not at its baseline for further treatment of her UTI.  She is back to baseline, she can return home with a prescription for cefdinir to treat her UTI.      I have reviewed the nursing notes. I have reviewed the findings, diagnosis, plan and need for follow up with the patient.    New Prescriptions    No medications on file       Final diagnoses:   TIA (transient ischemic attack)   Acute cystitis without hematuria     Chart documentation was completed with Dragon voice-recognition software. Even though reviewed, this chart may still contain some grammatical, spelling, and word errors.     Robbi Guzmán Md    [facility] EMERGENCY DEPARTMENT  6/16/2024     Robbi Guzmán MD  06/16/24 [id number]

## 2024-06-16 NOTE — ED TRIAGE NOTES
BIBA from home for stroke symptoms.  LKW: 10 pm 6/17    Reports feeling okay before bed but woke up this morning with left facial droop with right sided weakness and slurred speech.    HX of stroke    140/90  97 bpm  455 blood glucose  18g PIV LAC

## 2024-06-16 NOTE — LETTER
[facility] [address] [address]  [phone]    FACSIMILE TRANSMITTAL SHEET    TO: [email or web address]       FROM: Andie ENRIQUE   PHONE: [phone]  DATE: 06/18/24    NOTES/COMMENTS: CRESCENCIO discharge orders                                      IF YOU DID NOT RECEIVE THE CORRECT NUMBER OF PAGES OR THE FAX DID NOT COME THROUGH CLEARLY, PLEASE CALL THE SENDER     CONFIDENTIALITY STATEMENT: Confidential information that may accompany this transmission contains protected health information under state and federal law and is legally privileged. This information is intended only for the use of the individual or entity named above and may be used only for carrying out treatment, payment or other healthcare operations. The recipient or person responsible for delivering this information is prohibited by law from disclosing this information without proper authorization to any other party, unless required to do so by law or regulation. If you are not the intended recipient, you are hereby notified that any review, dissemination, distribution, or copying of this message is strictly prohibited. If you have received this communication in error, please destroy the materials and contact us immediately by calling the number listed above. No response indicates that the information was received by the appropriate authorized party

## 2024-06-16 NOTE — CONSULTS
[facility]    Stroke Consult Note    Reason for Consult: Left facial droop and right-sided weakness    Chief Complaint: Stroke Symptoms       HPI  Isabell Matthews is a 63 year old female with diabetes mellitus, diabetic polyneuropathy, 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 Per patient's daughter, her last known well was 10:30 PM last night before going to bed. This morning around 7:30 AM daughter noticed that patient is having significant left facial droop with drooling, slurred speech, and some difficulty moving the right side of her body while attempting to use her walker. Her right-sided weakness gradually resolved mostly on her way to the ED. At the ED she continued to have slurred speech, and left facial droop. Patient denies headaches, numbness tingling sensation, changes in vision or hearing, chest pain or dyspnea or abdominal pain, fever, chills, nausea, dysuria.      At baseline, patient has some mild left facial droop and dysarthria, dysphagia due to prior strokes. She walks with a walker due to gait imbalances.  She needs assistance with ADLs due to cognitive decline secondary to vascular dementia.      Stroke Evaluation Summarized    MRI/Head CT CT hea without contrast: No acute intracranial pathology.  Stable multifocal encephalomalacia and leukoaraiosis.   Intracranial Vasculature pending   Cervical Vasculature pending     Echocardiogram N/A   EKG/Telemetry Sinus rhythm   Other Testing Not Applicable      LDL  6/16/2024: 67 mg/dL   A1C  5/20/2024: 10.2 %   Troponin 6/16/2024: 31 ng/L        Impression  # History of prior strokes (right basal ganglia ischemic infarct in 2018, left basal ganglia hemorrhagic stroke 2021, right centrum semiovale & right chuck infarct 2023)   # Baseline facial droop and dysarthria, dysphagia  # Sudden onset worsening left facial droop and dysarthria, dysphagia  # Sudden onset right side hemiparesis, mostly resolved  Patient with multiple vascular risk factors including hypertension, hyperlipidemia, diabetes, and multiple episodes of ischemic and hemorrhagic strokes since 2018 with residue facial droop and dysphagia, history of seizures, presented with sudden onset left facial droop and dysarthria, dysphagia significantly worse than baseline per family.  Considering patient's multiple risk factors, and worsening neurological deficits, cannot exclude that this episode is due to new cerebrovascular attacks. On the other hand, it also could be recrudescence of prior strokes due to stressors such as UTI or DKA.     Recommendations   -Manage other infectious and metabolic causes, such as checking UA and beta hydroxybutyrate, troponin, plan per primary  -CT head without contrast  -MRI brain without contrast stroke protocol  -MRA head and neck   -A1c, LDL  -Telemetry      Patient Follow-up    - final recommendation pending work-up    Thank you for this consult. We will continue to follow.     The patient was discussed with the Stroke Staff is Dr. Wells.    Jess Nair MD  Neurology Resident, PGY-1  _____________________________________________________    Clinically Significant Risk Factors Present on Admission                # Drug Induced Platelet Defect: home medication list includes an antiplatelet medication   # Hypertension: Noted on problem list   # Dementia: noted on problem list  # Anemia: based on hgb <11               # Financial/Environmental Concerns:        # CKD, Stage 3b (GFR 30-44): Will monitor and treat as appropriate  - last Cr =  1.9 mg/dL (Ref range: 0.5  - 1.0 mg/dL)  - last GFR = 29 mL/min/1.73m2 (Ref range: >60 mL/min/1.73m2)     Past Medical History    Past Medical History:   Diagnosis Date    Chronic pain     Coronary atherosclerosis 6/14/2016    Essential hypertension     Ex-cigarette smoker     quit 7/2018 over 35 years    Ex-cigarette smoker     Hyperlipidemia     Hypothyroid     Seizures (H)     Stroke (H)     Vascular dementia (H)      Medications   Home Meds  Prior to Admission medications    Medication Sig Start Date End Date Taking? Authorizing Provider   acetaminophen (TYLENOL) 325 MG tablet Take 3 tablets (975 mg) by mouth every 8 hours 9/6/23   Dasha Riggs APRN CNP   amLODIPine (NORVASC) 5 MG tablet Take 1 tablet (5 mg) by mouth daily 10/16/23   Bony Castaneda MD   aspirin (ASA) 81 MG chewable tablet Take 1 tablet (81 mg) by mouth daily 9/14/23   Nighat Menard MD   atorvastatin (LIPITOR) 40 MG tablet Take 1 tablet (40 mg) by mouth daily 5/20/24   Bony Castaneda MD   blood glucose (TRUE METRIX BLOOD GLUCOSE TEST) test strip USE TO TEST BLOOD SUGAR 4 TO 5 TIMES DAILY OR AS DIRECTED 9/14/23   Nighat Menard MD   blood glucose monitoring (NO BRAND SPECIFIED) meter device kit Use to test blood sugar 4 times daily.  Please provide glucose meter that is covered by insurance. 4/14/23   Bony Castaneda MD   carvedilol (COREG) 12.5 MG tablet Take 1 tablet (12.5 mg) by mouth 2 times daily (with meals) 1/29/24   Bony Castaneda MD   Continuous Blood Gluc Sensor (FREESTYLE MARIA FERNANDA 2 SENSOR) [facility] 1 each every 14 days Use 1 sensor every 14 days. Use to read blood sugars per 's instructions. 10/16/23   Bony Castaneda MD   cyanocobalamin (VITAMIN B-12) 1000 MCG tablet Take 1 tablet (1,000 mcg) by mouth daily 9/14/23   Nighat Menard MD   cyclobenzaprine (FLEXERIL) 10 MG tablet Take 1 tablet (10 mg) by mouth nightly as needed for muscle spasms 5/20/24   Bony Castaneda MD   diclofenac (VOLTAREN)  1 % topical gel Apply 2 g topically 4 times daily as needed for moderate pain 10/16/23   Bony Castaneda MD   DULoxetine (CYMBALTA) 20 MG capsule Take 1 capsule (20 mg) by mouth daily 5/20/24   Bony Castaneda MD   gabapentin (NEURONTIN) 100 MG capsule Take 1 capsule (100 mg) by mouth at bedtime 5/20/24   Bony Castaneda MD   insulin glargine (LANTUS PEN) 100 UNIT/ML pen Inject 20 Units Subcutaneous at bedtime 5/20/24   Bony Castaneda MD   insulin lispro (HUMALOG KWIKPEN) 100 UNIT/ML (1 unit dial) KWIKPEN LOW INSULIN RESISTANCE DOSING Do Not give Bedtime Correction Insulin if BG less than 225. For - 299 give 2 unit. For  - 399 give 4 units For BG greater than or equal 400 give 5 units add 1 unit per 30 gram carb Max dose is 11 units per day 5/29/24   Bony Castaneda MD   insulin pen needle (31G X 8 MM) 31G X 8 MM miscellaneous Use 4 pen needles daily or as directed. 9/14/23   Nighat Menard MD   levETIRAcetam (KEPPRA) 500 MG tablet Take 1 tablet (500 mg) by mouth 2 times daily 5/20/24   Bony Castaneda MD   Lidocaine (LIDOCARE) 4 % Patch Place 1 patch onto the skin every 24 hours To prevent lidocaine toxicity, patient should be patch free for 12 hrs daily. 10/16/23   Bony Castaneda MD   loratadine (CLARITIN) 10 MG tablet Take 1 tablet (10 mg) by mouth daily 5/20/24   Bony Castaneda MD   METHYLCELLULOSE, LAXATIVE, PO Take 1 Tablespoonful by mouth daily as needed    Reported, Patient   nystatin (MYCOSTATIN) 342440 UNIT/GM external cream Apply topically 2 times daily Until rash clear 10/16/23   Bony Castaneda MD   polyethylene glycol (MIRALAX) 17 GM/Dose powder Take 17 g (1 Capful) by mouth every morning As needed for constipation 5/20/24   Bony Castaneda MD   psyllium (METAMUCIL/KONSYL) capsule Take 1 capsule by mouth daily With 4-8 ounce fluid daily 5/20/24 5/20/25  Bony Castaneda MD   SYNTHROID 75 MCG tablet Take 1 tablet (75 mcg) by  mouth daily 1/29/24   Bony Castaneda MD   Vitamin D3 (CHOLECALCIFEROL) 125 MCG (5000 UT) tablet Take 1 tablet (125 mcg) by mouth daily 9/14/23   Nighat Menard MD   zinc oxide (DESITIN) 20 % external ointment Apply topically as needed for dry skin or irritation 9/14/23   Nighat Menard MD       Scheduled Meds  No current facility-administered medications for this encounter.       Infusion Meds  No current facility-administered medications for this encounter.       Allergies   Allergies   Allergen Reactions    Seasonal Allergies           PHYSICAL EXAMINATION   Temp:  [97.6  F (36.4  C)] 97.6  F (36.4  C)  Pulse:  [86] 86  Resp:  [16] 16  BP: (165)/(85) 165/85  SpO2:  [96 %] 96 %    Neurologic  Mental Status:  alert, oriented only to self, follows simple commands, speech slow, naming and repetition normal  Cranial Nerves:  visual fields intact, PERRL, EOMI with normal smooth pursuit, facial sensation intact and symmetric, left facial droop with left flattened nasolabial fold, hearing not formally tested but intact to conversation, mild to moderate dysarthria, shoulder shrug strong bilaterally, tongue protrusion midline  Motor:  normal muscle tone and bulk, no abnormal movements, able to move all limbs spontaneously, strength 4-/5 throughout upper and lower extremities, no pronator drift  Reflexes:  toes down-going  Sensory:  light touch sensation intact and symmetric throughout upper and lower extremities, no extinction on double simultaneous stimulation   Coordination:  normal finger-to-nose and heel-to-shin bilaterally without dysmetria  Station/Gait:  deferred    Stroke Scales    NIHSS  1a. Level of Consciousness 0-->Alert, keenly responsive   1b. LOC Questions 2-->Answers neither question correctly   1c. LOC Commands 0-->Performs both tasks correctly   2.   Best Gaze 0-->Normal   3.   Visual 0-->No visual loss   4.   Facial Palsy 2-->Partial paralysis (total or near-total paralysis of lower  "face)   5a. Motor Arm, Left 0-->No drift, limb holds 90 (or 45) degrees for full 10 secs   5b. Motor Arm, Right 0-->No drift, limb holds 90 (or 45) degrees for full 10 secs   6a. Motor Leg, Left 0-->No drift, leg holds 30 degree position for full 5 secs   6b. Motor Leg, right 0-->No drift, leg holds 30 degree position for full 5 secs   7.   Limb Ataxia 0-->Absent   8.   Sensory 0-->Normal, no sensory loss   9.   Best Language 0-->No aphasia, normal   10. Dysarthria 1-->Mild-to-moderate dysarthria, patient slurs at least some words and, at worst, can be understood with some difficulty   11. Extinction and Inattention  0-->No abnormality   Total 5 (06/16/24 1442)       Imaging  I personally reviewed all imaging; relevant findings per HPI.    Labs Data   CBC  Recent Labs   Lab 06/16/24  0824   WBC 4.5   RBC 3.86   HGB 10.9*   HCT 34.4*        Basic Metabolic Panel   Recent Labs   Lab 06/16/24  0846 06/16/24  0824 06/16/24  0815   NA  --  139  --    POTASSIUM  --  4.2  --    CHLORIDE  --  107  --    CO2  --  23  --    BUN  --  34.4*  --    CR 1.9* 1.73*  --    GLC  --  368* 355*   VERONICA  --  8.8  --      Liver Panel  No results for input(s): \"PROTTOTAL\", \"ALBUMIN\", \"BILITOTAL\", \"ALKPHOS\", \"AST\", \"ALT\", \"BILIDIRECT\" in the last 168 hours.  INR    Recent Labs   Lab Test 06/16/24  0824 03/07/24  0111 03/07/24  0109   INR 0.98 1.1* 0.99           Stroke Consult Data Data       "

## 2024-06-16 NOTE — ED NOTES
Emergency Department Patient Sign-out       Brief HPI:  This is a 63 year old female signed out to me by Dr. Wynn .  See initial ED Provider note for details of the presentation.          Patient signed out to me pending neurology recs after MRIs      Exam:   Patient Vitals for the past 24 hrs:   BP Temp Temp src Pulse Resp SpO2   06/16/24 1615 (!) 173/83 97.6  F (36.4  C) Oral 83 16 98 %   06/16/24 1200 (!) 166/80 -- -- 79 16 99 %   06/16/24 0817 (!) 165/85 97.6  F (36.4  C) Oral 86 16 96 %       Patient laying in bed in no acute distress, wake and alert but minimally responsive to questions but responding.  Vital signs stable, no acute complaints.  Recent with admission    ED RESULTS:   Results for orders placed or performed during the hospital encounter of 06/16/24 (from the past 24 hour(s))   Glucose by meter     Status: Abnormal    Collection Time: 06/16/24  8:15 AM   Result Value Ref Range    GLUCOSE BY METER POCT 355 (H) 70 - 99 mg/dL   Neurology Stroke Adult IP Consult: Suspected stroke/TIA; Consultant may enter orders: Yes; Requesting provider? ED Provider     Status: None ()    Collection Time: 06/16/24  8:22 AM    Narrative    Jess Nair MD     6/16/2024  5:36 PM        [facility]    Stroke Consult Note    Reason for Consult: Left facial droop and right-sided weakness    Chief Complaint: Stroke Symptoms       HPI  Isabell Matthews is a 63 year old female with diabetes mellitus,   diabetic polyneuropathy, vascular vs Alzheimer dementia, multiple   prior strokes (right basal ganglia ischemic infarct in 2018, left   basal ganglia hemorrhagic stroke 2021, right centrum semiovale &   right chuck 2023), history of seizures, hypertension,   hyperlipidemia, chronic kidney disease, recurrent UTI, multiple   hospitalization due to DKA, presented with sudden onset   left-sided facial droop and right-sided weakness/incoordination.   Per patient's daughter, her last  known well was 10:30 PM last   night before going to bed. This morning around 7:30 AM daughter   noticed that patient is having significant left facial droop with   drooling, slurred speech, and some difficulty moving the right   side of her body while attempting to use her walker. Her   right-sided weakness gradually resolved mostly on her way to the   ED. At the ED she continued to have slurred speech, and left   facial droop. Patient denies headaches, numbness tingling   sensation, changes in vision or hearing, chest pain or dyspnea or   abdominal pain, fever, chills, nausea, dysuria.      At baseline, patient has some mild left facial droop and   dysarthria, dysphagia due to prior strokes. She walks with a   walker due to gait imbalances.  She needs assistance with ADLs   due to cognitive decline secondary to vascular dementia.      Stroke Evaluation Summarized    MRI/Head CT CT hea without contrast: No acute intracranial   pathology.  Stable multifocal encephalomalacia and leukoaraiosis.     Intracranial Vasculature pending   Cervical Vasculature pending     Echocardiogram N/A   EKG/Telemetry Sinus rhythm   Other Testing Not Applicable      LDL  6/16/2024: 67 mg/dL   A1C  5/20/2024: 10.2 %   Troponin 6/16/2024: 31 ng/L       Impression  # History of prior strokes (right basal ganglia ischemic infarct   in 2018, left basal ganglia hemorrhagic stroke 2021, right   centrum semiovale & right chuck infarct 2023)   # Baseline facial droop and dysarthria, dysphagia  # Sudden onset worsening left facial droop and dysarthria,   dysphagia  # Sudden onset right side hemiparesis, mostly resolved  Patient with multiple vascular risk factors including   hypertension, hyperlipidemia, diabetes, and multiple episodes of   ischemic and hemorrhagic strokes since 2018 with residue facial   droop and dysphagia, history of seizures, presented with sudden   onset left facial droop and dysarthria, dysphagia significantly   worse than  baseline per family.  Considering patient's multiple   risk factors, and worsening neurological deficits, cannot exclude   that this episode is due to new cerebrovascular attacks. On the   other hand, it also could be recrudescence of prior strokes due   to stressors such as UTI or DKA.     Recommendations   -Manage other infectious and metabolic causes, such as checking   UA and beta hydroxybutyrate, troponin, plan per primary  -CT head without contrast  -MRI brain without contrast stroke protocol  -MRA head and neck   -A1c, LDL  -Telemetry      Patient Follow-up    - final recommendation pending work-up    Thank you for this consult. We will continue to follow.     The patient was discussed with the Stroke Staff is Dr. Wells.    Jess Nair MD  Neurology Resident, PGY-1  _____________________________________________________    Clinically Significant Risk Factors Present on Admission                # Drug Induced Platelet Defect: home medication list includes an   antiplatelet medication   # Hypertension: Noted on problem list   # Dementia: noted on problem list  # Anemia: based on hgb <11               # Financial/Environmental Concerns:        # CKD, Stage 3b (GFR 30-44): Will monitor and treat as   appropriate  - last Cr =  1.9 mg/dL (Ref range: 0.5 - 1.0 mg/dL)  - last GFR = 29 mL/min/1.73m2 (Ref range: >60 mL/min/1.73m2)     Past Medical History    Past Medical History:   Diagnosis Date    Chronic pain     Coronary atherosclerosis 6/14/2016    Essential hypertension     Ex-cigarette smoker     quit 7/2018 over 35 years    Ex-cigarette smoker     Hyperlipidemia     Hypothyroid     Seizures (H)     Stroke (H)     Vascular dementia (H)      Medications   Home Meds  Prior to Admission medications    Medication Sig Start Date End Date Taking? Authorizing Provider   acetaminophen (TYLENOL) 325 MG tablet Take 3 tablets (975 mg) by   mouth every 8 hours 9/6/23   Dasha Riggs APRN CNP   amLODIPine (NORVASC) 5  MG tablet Take 1 tablet (5 mg) by mouth   daily 10/16/23   Bony Castaneda MD   aspirin (ASA) 81 MG chewable tablet Take 1 tablet (81 mg) by   mouth daily 9/14/23   Nighat Menard MD   atorvastatin (LIPITOR) 40 MG tablet Take 1 tablet (40 mg) by   mouth daily 5/20/24   Bony Castaneda MD   blood glucose (TRUE METRIX BLOOD GLUCOSE TEST) test strip USE TO   TEST BLOOD SUGAR 4 TO 5 TIMES DAILY OR AS DIRECTED 9/14/23     Nighat Menard MD   blood glucose monitoring (NO BRAND SPECIFIED) meter device kit   Use to test blood sugar 4 times daily.  Please provide glucose   meter that is covered by insurance. 4/14/23   Bony Castaneda MD   carvedilol (COREG) 12.5 MG tablet Take 1 tablet (12.5 mg) by   mouth 2 times daily (with meals) 1/29/24   Bony Castaneda MD   Continuous Blood Gluc Sensor (FREESTYLE MARIA FERNANDA 2 SENSOR) [facility] 1   each every 14 days Use 1 sensor every 14 days. Use to read blood   sugars per 's instructions. 10/16/23   Bony Castaneda MD   cyanocobalamin (VITAMIN B-12) 1000 MCG tablet Take 1 tablet   (1,000 mcg) by mouth daily 9/14/23   Nighat Menard MD   cyclobenzaprine (FLEXERIL) 10 MG tablet Take 1 tablet (10 mg) by   mouth nightly as needed for muscle spasms 5/20/24   Bony Castaneda MD   diclofenac (VOLTAREN) 1 % topical gel Apply 2 g topically 4 times   daily as needed for moderate pain 10/16/23   Bony Castaneda MD   DULoxetine (CYMBALTA) 20 MG capsule Take 1 capsule (20 mg) by   mouth daily 5/20/24   Bony Castaneda MD   gabapentin (NEURONTIN) 100 MG capsule Take 1 capsule (100 mg) by   mouth at bedtime 5/20/24   Bony Castaneda MD   insulin glargine (LANTUS PEN) 100 UNIT/ML pen Inject 20 Units   Subcutaneous at bedtime 5/20/24   Bony Castaneda MD   insulin lispro (HUMALOG KWIKPEN) 100 UNIT/ML (1 unit dial)   KWIKPEN LOW INSULIN RESISTANCE DOSING Do Not give Bedtime   Correction Insulin if BG less than 225. For  - 299 give 2   unit. For  - 399 give 4 units For BG greater than or equal   400 give 5 units add 1 unit per 30 gram carb Max dose is 11 units   per day 5/29/24   Bony Castaneda MD   insulin pen needle (31G X 8 MM) 31G X 8 MM miscellaneous Use 4   pen needles daily or as directed. 9/14/23   Nighat Menard MD   levETIRAcetam (KEPPRA) 500 MG tablet Take 1 tablet (500 mg) by   mouth 2 times daily 5/20/24   Bony Castaneda MD   Lidocaine (LIDOCARE) 4 % Patch Place 1 patch onto the skin every   24 hours To prevent lidocaine toxicity, patient should be patch   free for 12 hrs daily. 10/16/23   Bony Castaneda MD   loratadine (CLARITIN) 10 MG tablet Take 1 tablet (10 mg) by mouth   daily 5/20/24   Bony Castaneda MD   METHYLCELLULOSE, LAXATIVE, PO Take 1 Tablespoonful by mouth daily   as needed    Reported, Patient   nystatin (MYCOSTATIN) 596842 UNIT/GM external cream Apply   topically 2 times daily Until rash clear 10/16/23   Bony Castaneda MD   polyethylene glycol (MIRALAX) 17 GM/Dose powder Take 17 g (1   Capful) by mouth every morning As needed for constipation 5/20/24     Bony Castaneda MD   psyllium (METAMUCIL/KONSYL) capsule Take 1 capsule by mouth daily   With 4-8 ounce fluid daily 5/20/24 5/20/25  Bony Castaneda MD   SYNTHROID 75 MCG tablet Take 1 tablet (75 mcg) by mouth daily   1/29/24   Bony Castaneda MD   Vitamin D3 (CHOLECALCIFEROL) 125 MCG (5000 UT) tablet Take 1   tablet (125 mcg) by mouth daily 9/14/23   Nighat Menard MD   zinc oxide (DESITIN) 20 % external ointment Apply topically as   needed for dry skin or irritation 9/14/23   Nighat Menard MD       Scheduled Meds  No current facility-administered medications for this encounter.       Infusion Meds  No current facility-administered medications for this encounter.       Allergies   Allergies   Allergen Reactions    Seasonal Allergies           PHYSICAL EXAMINATION    Temp:  [97.6  F (36.4  C)] 97.6  F (36.4  C)  Pulse:  [86] 86  Resp:  [16] 16  BP: (165)/(85) 165/85  SpO2:  [96 %] 96 %    Neurologic  Mental Status:  alert, oriented only to self, follows simple   commands, speech slow, naming and repetition normal  Cranial Nerves:  visual fields intact, PERRL, EOMI with normal   smooth pursuit, facial sensation intact and symmetric, left   facial droop with left flattened nasolabial fold, hearing not   formally tested but intact to conversation, mild to moderate   dysarthria, shoulder shrug strong bilaterally, tongue protrusion   midline  Motor:  normal muscle tone and bulk, no abnormal movements, able   to move all limbs spontaneously, strength 4-/5 throughout upper   and lower extremities, no pronator drift  Reflexes:  toes down-going  Sensory:  light touch sensation intact and symmetric throughout   upper and lower extremities, no extinction on double simultaneous   stimulation   Coordination:  normal finger-to-nose and heel-to-shin bilaterally   without dysmetria  Station/Gait:  deferred    Stroke Scales    NIHSS  1a. Level of Consciousness 0-->Alert, keenly responsive   1b. LOC Questions 2-->Answers neither question correctly   1c. LOC Commands 0-->Performs both tasks correctly   2.   Best Gaze 0-->Normal   3.   Visual 0-->No visual loss   4.   Facial Palsy 2-->Partial paralysis (total or near-total   paralysis of lower face)   5a. Motor Arm, Left 0-->No drift, limb holds 90 (or 45) degrees   for full 10 secs   5b. Motor Arm, Right 0-->No drift, limb holds 90 (or 45) degrees   for full 10 secs   6a. Motor Leg, Left 0-->No drift, leg holds 30 degree position   for full 5 secs   6b. Motor Leg, right 0-->No drift, leg holds 30 degree position   for full 5 secs   7.   Limb Ataxia 0-->Absent   8.   Sensory 0-->Normal, no sensory loss   9.   Best Language 0-->No aphasia, normal   10. Dysarthria 1-->Mild-to-moderate dysarthria, patient slurs at   least some words and, at worst,  "can be understood with some   difficulty   11. Extinction and Inattention  0-->No abnormality   Total 5 (06/16/24 1442)       Imaging  I personally reviewed all imaging; relevant findings per HPI.    Labs Data   CBC  Recent Labs   Lab 06/16/24 0824   WBC 4.5   RBC 3.86   HGB 10.9*   HCT 34.4*        Basic Metabolic Panel   Recent Labs   Lab 06/16/24  0846 06/16/24  0824 06/16/24  0815   NA  --  139  --    POTASSIUM  --  4.2  --    CHLORIDE  --  107  --    CO2  --  23  --    BUN  --  34.4*  --    CR 1.9* 1.73*  --    GLC  --  368* 355*   VERONICA  --  8.8  --      Liver Panel  No results for input(s): \"PROTTOTAL\", \"ALBUMIN\", \"BILITOTAL\",   \"ALKPHOS\", \"AST\", \"ALT\", \"BILIDIRECT\" in the last 168 hours.  INR    Recent Labs   Lab Test 06/16/24  0824 03/07/24  0111 03/07/24  0109   INR 0.98 1.1* 0.99           Stroke Consult Data Data        CBC with Platelets & Differential     Status: Abnormal    Collection Time: 06/16/24  8:24 AM    Narrative    The following orders were created for panel order CBC with Platelets & Differential.  Procedure                               Abnormality         Status                     ---------                               -----------         ------                     CBC with platelets and d...[[id number]]  Abnormal            Final result                 Please view results for these tests on the individual orders.   Basic metabolic panel     Status: Abnormal    Collection Time: 06/16/24  8:24 AM   Result Value Ref Range    Sodium 139 135 - 145 mmol/L    Potassium 4.2 3.4 - 5.3 mmol/L    Chloride 107 98 - 107 mmol/L    Carbon Dioxide (CO2) 23 22 - 29 mmol/L    Anion Gap 9 7 - 15 mmol/L    Urea Nitrogen 34.4 (H) 8.0 - 23.0 mg/dL    Creatinine 1.73 (H) 0.51 - 0.95 mg/dL    GFR Estimate 32 (L) >60 mL/min/1.73m2    Calcium 8.8 8.8 - 10.2 mg/dL    Glucose 368 (H) 70 - 99 mg/dL   INR     Status: Normal    Collection Time: 06/16/24  8:24 AM   Result Value Ref Range    INR 0.98 0.85 - 1.15 "   Partial thromboplastin time     Status: Normal    Collection Time: 06/16/24  8:24 AM   Result Value Ref Range    aPTT 29 22 - 38 Seconds   Troponin T, High Sensitivity     Status: Abnormal    Collection Time: 06/16/24  8:24 AM   Result Value Ref Range    Troponin T, High Sensitivity 33 (H) <=14 ng/L   CBC with platelets and differential     Status: Abnormal    Collection Time: 06/16/24  8:24 AM   Result Value Ref Range    WBC Count 4.5 4.0 - 11.0 10e3/uL    RBC Count 3.86 3.80 - 5.20 10e6/uL    Hemoglobin 10.9 (L) 11.7 - 15.7 g/dL    Hematocrit 34.4 (L) 35.0 - 47.0 %    MCV 89 78 - 100 fL    MCH 28.2 26.5 - 33.0 pg    MCHC 31.7 31.5 - 36.5 g/dL    RDW 14.6 10.0 - 15.0 %    Platelet Count 180 150 - 450 10e3/uL    % Neutrophils 56 %    % Lymphocytes 36 %    % Monocytes 6 %    % Eosinophils 2 %    % Basophils 0 %    % Immature Granulocytes 0 %    NRBCs per 100 WBC 0 <1 /100    Absolute Neutrophils 2.6 1.6 - 8.3 10e3/uL    Absolute Lymphocytes 1.6 0.8 - 5.3 10e3/uL    Absolute Monocytes 0.3 0.0 - 1.3 10e3/uL    Absolute Eosinophils 0.1 0.0 - 0.7 10e3/uL    Absolute Basophils 0.0 0.0 - 0.2 10e3/uL    Absolute Immature Granulocytes 0.0 <=0.4 10e3/uL    Absolute NRBCs 0.0 10e3/uL   Lipid panel reflex to direct LDL     Status: Abnormal    Collection Time: 06/16/24  8:24 AM   Result Value Ref Range    Cholesterol 134 <200 mg/dL    Triglycerides 119 <150 mg/dL    Direct Measure HDL 43 (L) >=50 mg/dL    LDL Cholesterol Calculated 67 <=100 mg/dL    Non HDL Cholesterol 91 <130 mg/dL    Narrative    Cholesterol  Desirable:  <200 mg/dL    Triglycerides  Normal:  Less than 150 mg/dL  Borderline High:  150-199 mg/dL  High:  200-499 mg/dL  Very High:  Greater than or equal to 500 mg/dL    Direct Measure HDL  Female:  Greater than or equal to 50 mg/dL   Male:  Greater than or equal to 40 mg/dL    LDL Cholesterol  Desirable:  <100mg/dL  Above Desirable:  100-129 mg/dL   Borderline High:  130-159 mg/dL   High:  160-189 mg/dL   Very  High:  >= 190 mg/dL    Non HDL Cholesterol  Desirable:  130 mg/dL  Above Desirable:  130-159 mg/dL  Borderline High:  160-189 mg/dL  High:  190-219 mg/dL  Very High:  Greater than or equal to 220 mg/dL   Ketone Beta-Hydroxybutyrate Quantitative     Status: Normal    Collection Time: 06/16/24  8:24 AM   Result Value Ref Range    Ketone (Beta-Hydroxybutyrate) Quantitative <0.18 <=0.30 mmol/L   EKG 12-lead, tracing only     Status: None    Collection Time: 06/16/24  8:28 AM   Result Value Ref Range    Systolic Blood Pressure  mmHg    Diastolic Blood Pressure  mmHg    Ventricular Rate 84 BPM    Atrial Rate 84 BPM    OR Interval 146 ms    QRS Duration 88 ms     ms    QTc 453 ms    P Axis 32 degrees    R AXIS 25 degrees    T Axis 15 degrees    Interpretation ECG       Sinus rhythm  Possible Lateral infarct , age undetermined  Inferior infarct , age undetermined  Abnormal ECG  Unconfirmed report - interpretation of this ECG is computer generated - see medical record for final interpretation    Confirmed by - EMERGENCY ROOM, PHYSICIAN ([id number]),  CARSON PAREDES ([id number]) on 6/16/2024 8:56:30 AM     Creatinine POCT     Status: Abnormal    Collection Time: 06/16/24  8:46 AM   Result Value Ref Range    Creatinine POCT 1.9 (H) 0.5 - 1.0 mg/dL    GFR, ESTIMATED POCT 29 (L) >60 mL/min/1.73m2   CT Head w/o Contrast     Status: None    Collection Time: 06/16/24 10:15 AM    Narrative    EXAM: CT HEAD W/O CONTRAST  6/16/2024 10:15 AM     HISTORY: Acute neuro deficit, stroke/TIA suspected       COMPARISON: CT head 3/7/2024, MRI of 3/7/2024.    TECHNIQUE: Using multidetector thin collimation helical acquisition  technique, axial, coronal and sagittal CT images from the skull base  to the vertex were obtained without intravenous contrast.   (topogram) image(s) also obtained and reviewed.    FINDINGS:  No acute intracranial hemorrhage, mass effect, or midline shift.  Stable encephalomalacia involving the bilateral external  capsules,  left greater than right. No acute loss of gray-white matter  differentiation in the cerebral hemispheres. Ventricles are  proportionate to the cerebral sulci. Clear basal cisterns. Patchy and  confluent hypoattenuation within the the periventricular and  subcortical cerebral white matter, as well as in the chuck likely  secondary to chronic small vessel ischemic disease.    The bony calvaria and the bones of the skull base are normal. The  visualized portions of the paranasal sinuses and mastoid air cells are  clear. Bilateral pseudophakia.       Impression    IMPRESSION:   1. No acute intracranial pathology.   2. Stable multifocal encephalomalacia and leukoaraiosis.    I have personally reviewed the examination and initial interpretation  and I agree with the findings.    GREG QUEVEDO MD         SYSTEM ID:  [id number]   Glucose by meter     Status: Abnormal    Collection Time: 06/16/24 12:09 PM   Result Value Ref Range    GLUCOSE BY METER POCT 143 (H) 70 - 99 mg/dL   Troponin T, High Sensitivity     Status: Abnormal    Collection Time: 06/16/24 12:36 PM   Result Value Ref Range    Troponin T, High Sensitivity 31 (H) <=14 ng/L   UA with Microscopic reflex to Culture     Status: Abnormal    Collection Time: 06/16/24  1:27 PM    Specimen: Urine, Straight Catheter   Result Value Ref Range    Color Urine Light Yellow Colorless, Straw, Light Yellow, Yellow    Appearance Urine Slightly Cloudy (A) Clear    Glucose Urine 1000 (A) Negative mg/dL    Bilirubin Urine Negative Negative    Ketones Urine Negative Negative mg/dL    Specific Gravity Urine 1.014 1.003 - 1.035    Blood Urine Negative Negative    pH Urine 5.5 5.0 - 7.0    Protein Albumin Urine 20 (A) Negative mg/dL    Urobilinogen Urine Normal Normal, 2.0 mg/dL    Nitrite Urine Positive (A) Negative    Leukocyte Esterase Urine Large (A) Negative    Bacteria Urine Moderate (A) None Seen /HPF    Mucus Urine Present (A) None Seen /LPF    RBC Urine 2 <=2 /HPF     WBC Urine 25 (H) <=5 /HPF    Squamous Epithelials Urine 3 (H) <=1 /HPF    Narrative    Urine Culture ordered based on laboratory criteria   Glucose by meter     Status: Abnormal    Collection Time: 06/16/24  2:55 PM   Result Value Ref Range    GLUCOSE BY METER POCT 154 (H) 70 - 99 mg/dL   MR Brain w/o Contrast     Status: None (Preliminary result)    Collection Time: 06/16/24  5:17 PM    Impression    RESIDENT PRELIMINARY INTERPRETATION  IMPRESSION:  1. Hyperacute infarct in the right insular cortex.    2. Chronic infarcts and microhemorrhages detailed above.    These findings were communicated to Dr. Austin by Dr. Jeronimo George  at 6/16/2024 5:39 PM via telephone and understanding was verbalized.       ED MEDICATIONS:   Medications   sodium chloride 0.9% BOLUS 500 mL (0 mLs Intravenous Stopped 6/16/24 1031)   sodium chloride 0.9% BOLUS 500 mL (0 mLs Intravenous Stopped 6/16/24 1551)   cefTRIAXone (ROCEPHIN) 1 g vial to attach to  mL bag for ADULTS or NS 50 mL bag for PEDS (1 g Intravenous $New Bag 6/16/24 4164)         Impression:    ICD-10-CM    1. TIA (transient ischemic attack)  G45.9       2. Acute cystitis without hematuria  N30.00       3. Ischemic stroke (H)  I63.9           Plan:    Pending studies include MRIs.    Her eyes show acute stroke, neurology paged  Spoke with neurology who stated hold antiplatelet, blood thinning medications at this point admit to neurology  Patient admitted to acute stroke neurology      MD Norman Carrero Collin, MD  06/16/24 2448

## 2024-06-16 NOTE — DISCHARGE INSTRUCTIONS
Take complete course of cefdinir.    Follow-up with your primary care clinic this week.    Return if fever, vomiting, or other concerns.    ______________________________________________________________________________________________    [facility] - home RN & SLP  Ph: [phone]  Fax: [phone]    [facility] - incontinence supplies  Ph: [phone]  Fax: [phone]        Diabetes Management Team Discharge Recommendations:     Blood Glucose Checks: three times daily before meals, and at bedtime via glucometer.    Diabetes Medications:     1) Insulin Lantus 13 units once daily at 7pm today, 9-10pm tomorrow and continued at bedtime moving forward. *IF fasting glucose >180, increase by 2 units daily back up to prior to admission dose of 20 units daily (do NOT exceed 20 units daily)*    2) Insulin Humalog 2-4 units three times daily with meals based on experience (or try 1 unit per 25 grams of carbohydrates) plus correction scale: 1 unit per 100>200 before meals and at bedtime.    Correction scale before breakfast, lunch, dinner, bedtime:  - Combine with set meal dose above and give all at one time, ideally 10-15 minutes before eating.  - If skipping a meal or it is bedtime, skip set meal dose above, still use this correction scale (make sure to separate by at least 4 hours from previous correction scale dose)  Do Not give Correction Insulin if BG less than 200.   For  - 299 give 1 unit.   For  - 399 give 2 units   For BG greater than or equal 400 give 3 units.   Notify provider if glucose greater than or equal to 350 mg/dL after administration of correction dose.    Diabetes Outpatient follow up: Has next scheduled follow up with PCP, Dr. Castaneda 10/7/2024. Please follow up sooner if blood glucose runs consistently greater than 180 mg/dL or having episodes less than 70 mg/dL.     How to treat a low blood sugar (<70 mg/dL):  You may be experiencing hypoglycemia (low blood  "sugar) if:  BG less than 70 mg/dL or when feeling symptoms of hypoglycemia such as:  Sweating  Shakiness/Tremors  Increased appetite  Nausea  Dizziness or light-headedness  Sleepiness  Weakness  Rapid heart rate  Headache  Tingling around mouth and tongue    If you are having a low blood sugar, do the following steps: \"RULE OF 15s\"   -Eat 15 grams simple Carbs (1/2 cup orange juice, 4 glucose tablets, 1/2 cup regular soda, packet of fruit snacks)  -Wait 15 minutes  -Test with your blood glucose meter again  -If your blood sugar is above 70, then continue normally  -If still less than 70, repeat above process until over 70  -Call clinic for recurrent low blood glucose (more than 2 a week or 2 a day)    If you have urgent questions or concerns regarding your blood sugars or insulin, you may contact [phone] (the main hospital ). Ask to speak with the endocrinologist on call.    Thank you for letting the Diabetes Management Team be involved in your care!    "

## 2024-06-16 NOTE — H&P
[facility]    Stroke Admission Note    Chief Complaint  Left facial droop and right-sided weakness     HPI  Isabell Matthews is a 63 year old female with diabetes mellitus, diabetic polyneuropathy, 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 Per patient's daughter, her last known well was 10:30 PM last night before going to bed. This morning around 7:30 AM daughter noticed that patient is having significant left facial droop with drooling, slurred speech, and some difficulty moving the right side of her body while attempting to use her walker. Her right-sided weakness gradually resolved mostly on her way to the ED. At the ED she continued to have slurred speech, and left facial droop. Patient denies headaches, numbness tingling sensation, changes in vision or hearing, chest pain or dyspnea or abdominal pain, fever, chills, nausea, dysuria.       At baseline, patient has some mild left facial droop and dysarthria, dysphagia due to prior strokes. She walks with a walker due to gait imbalances. 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    Intravenous Thrombolysis  Not given due to:   - unclear or unfavorable risk-benefit profile for extended window thrombolysis beyond the conventional 4.5 hour time window    Endovascular Treatment  Not initiated due to absence of proximal vessel occlusion    Impression   # History of prior strokes (right basal ganglia ischemic infarct in 2018, left basal ganglia hemorrhagic stroke 2021, right centrum semiovale & right  chuck infarct 2023)   # Baseline facial droop and dysarthria, dysphagia  # Sudden onset worsening left facial droop and dysarthria, dysphagia  # Sudden onset right side hemiparesis, mostly resolved  # Acute/subacute right insular and right frontal operculum infarct 2/2 ESUS vs ICAD  # Occlusion of MCA M2 segment  Patient with multiple vascular risk factors including hypertension, hyperlipidemia, diabetes, and multiple episodes of ischemic and hemorrhagic strokes since 2018 with residual facial droop and dysphagia, history of seizures, presented with sudden onset left facial droop and dysarthria, dysphagia significantly worse than baseline facial droop and dysarthria per family. Considering patient's multiple risk factors, and worsening neurological deficits, cannot exclude that this episode is due to new cerebrovascular attacks. On the other hand, it also could be recrudescence of prior strokes due to stressors such as UTI or DKA. Patient's urinalysis is indicated of of UTI. Recent A1c 10.2%, LDL 67. Later MRI/MRA stroke protocol showed: acute/subacute right insular and right frontal operculum infarct. Occlusion of MCA M2 segment. Etiology has prior been thought to be ESUS.   Plan  - MRI Stroke Protocol  - MRA Stroke Protocol  - Admit to Neurology  - Neurochecks Q 4 hours  - Permissive HTN; labetalol PRN for SBP > 220  - Avoid hypotonic IV fluids  - Load aspirin 324 mg for secondary stroke prevention (will have to be rectal due to NPO)  - Start dual antiplatelet therapy tomorrow  - LDL 67, continue atorvastatin 40 mg  - TTE with Bubble Study  - Telemetry, EKG  - Bedside Glucose Monitoring  - Nutrition: NPO until speech eval  - PT/OT/SLP  - Stroke Education  - Depression Screen  - Apnea Screen  - Euthermia, Euglycemia    # Hypertension  # Hyperlipidemia  - Permissive HTN; labetalol PRN for SBP > 220  - LDL 67, continue atorvastatin from 40 mg  - Hold PTA Carvedilol 12.5mg  - Hold PTA Amlodipine 5mg    #Diabetes  mellitus  - Recent A1c 10.2%  - Currently NPO till speech evaluation  - Will hold sliding scale insulin and PTA insulin for now    # Diabetic neuropathy  -Hold PTA gabapentin 100 mg at bedtime  -Hold duloxetine 20 mg daily    # UTI, recurrent  -ceftriaxone 1g x 5 days    Prophylaxis            For VTE Prevention:  - pneumatic compression device  - subcutaneous heparin    For Acid Suppression:  - No indications    Code Status  DNI/DNR    During initial physical assessment, the plan of care was discussed and developed with patient and caregiver.  Plan of care includes: Stroke workup, medical treatment .    Patient was admitted via [facility] ED ([place])    The patient will be admitted to the Neuro Critical Care/Stroke team..     The patient was discussed with the Stroke Staff is Dr. Diaz.    Jess Nair MD  Neurology Resident, PGY-1  ___________________________________________________    Nutrition: NPO till SLP evaluation    None    Clinically Significant Risk Factors Present on Admission                # Drug Induced Platelet Defect: home medication list includes an antiplatelet medication   # Hypertension: Noted on problem list   # Dementia: noted on problem list  # Anemia: based on hgb <11               # Financial/Environmental Concerns:        # CKD, Stage 3b (GFR 30-44): Will monitor and treat as appropriate  - last Cr =  1.9 mg/dL (Ref range: 0.5 - 1.0 mg/dL)  - last GFR = 29 mL/min/1.73m2 (Ref range: >60 mL/min/1.73m2)    Past Medical History   Past Medical History:   Diagnosis Date    Chronic pain     Coronary atherosclerosis 6/14/2016    Essential hypertension     Ex-cigarette smoker     quit 7/2018 over 35 years    Ex-cigarette smoker     Hyperlipidemia     Hypothyroid     Seizures (H)     Stroke (H)     Vascular dementia (H)      Past Surgical History   Past Surgical History:   Procedure Laterality Date    athroplasty hip Bilateral     2003, 2006    OTHER SURGICAL HISTORY       athroplasty hip    PICC DOUBLE LUMEN PLACEMENT Right 06/11/2023    right basilic 5 fr dl power picc 39 cm    STENT       Medications   Home Meds  Prior to Admission medications    Medication Sig Start Date End Date Taking? Authorizing Provider   cefdinir (OMNICEF) 300 MG capsule Take 1 capsule (300 mg) by mouth daily for 7 days 6/16/24 6/23/24 Yes Robbi Guzmán MD   acetaminophen (TYLENOL) 325 MG tablet Take 3 tablets (975 mg) by mouth every 8 hours 9/6/23   Dasha Riggs APRN CNP   amLODIPine (NORVASC) 5 MG tablet Take 1 tablet (5 mg) by mouth daily 10/16/23   Bony Castaneda MD   aspirin (ASA) 81 MG chewable tablet Take 1 tablet (81 mg) by mouth daily 9/14/23   Nighat Menard MD   atorvastatin (LIPITOR) 40 MG tablet Take 1 tablet (40 mg) by mouth daily 5/20/24   Bony Castaneda MD   blood glucose (TRUE METRIX BLOOD GLUCOSE TEST) test strip USE TO TEST BLOOD SUGAR 4 TO 5 TIMES DAILY OR AS DIRECTED 9/14/23   Nighat Menard MD   blood glucose monitoring (NO BRAND SPECIFIED) meter device kit Use to test blood sugar 4 times daily.  Please provide glucose meter that is covered by insurance. 4/14/23   Bony Castaneda MD   carvedilol (COREG) 12.5 MG tablet Take 1 tablet (12.5 mg) by mouth 2 times daily (with meals) 1/29/24   Bony Castaneda MD   Continuous Blood Gluc Sensor (FREESTYLE MARIA FERNANDA 2 SENSOR) [facility] 1 each every 14 days Use 1 sensor every 14 days. Use to read blood sugars per 's instructions. 10/16/23   Bony Castaneda MD   cyanocobalamin (VITAMIN B-12) 1000 MCG tablet Take 1 tablet (1,000 mcg) by mouth daily 9/14/23   Nighat Menard MD   cyclobenzaprine (FLEXERIL) 10 MG tablet Take 1 tablet (10 mg) by mouth nightly as needed for muscle spasms 5/20/24   Bony Castaneda MD   diclofenac (VOLTAREN) 1 % topical gel Apply 2 g topically 4 times daily as needed for moderate pain 10/16/23   Bony Castaneda MD   DULoxetine (CYMBALTA) 20 MG capsule  Take 1 capsule (20 mg) by mouth daily 5/20/24   Bony Castaneda MD   gabapentin (NEURONTIN) 100 MG capsule Take 1 capsule (100 mg) by mouth at bedtime 5/20/24   Bony Castaneda MD   insulin glargine (LANTUS PEN) 100 UNIT/ML pen Inject 20 Units Subcutaneous at bedtime 5/20/24   Bony Castaneda MD   insulin lispro (HUMALOG KWIKPEN) 100 UNIT/ML (1 unit dial) KWIKPEN LOW INSULIN RESISTANCE DOSING Do Not give Bedtime Correction Insulin if BG less than 225. For - 299 give 2 unit. For  - 399 give 4 units For BG greater than or equal 400 give 5 units add 1 unit per 30 gram carb Max dose is 11 units per day 5/29/24   Bony Castaneda MD   insulin pen needle (31G X 8 MM) 31G X 8 MM miscellaneous Use 4 pen needles daily or as directed. 9/14/23   Nighat Menard MD   levETIRAcetam (KEPPRA) 500 MG tablet Take 1 tablet (500 mg) by mouth 2 times daily 5/20/24   Bony Castaneda MD   Lidocaine (LIDOCARE) 4 % Patch Place 1 patch onto the skin every 24 hours To prevent lidocaine toxicity, patient should be patch free for 12 hrs daily. 10/16/23   Bony Castaneda MD   loratadine (CLARITIN) 10 MG tablet Take 1 tablet (10 mg) by mouth daily 5/20/24   Bony Castaneda MD   METHYLCELLULOSE, LAXATIVE, PO Take 1 Tablespoonful by mouth daily as needed    Reported, Patient   nystatin (MYCOSTATIN) 462094 UNIT/GM external cream Apply topically 2 times daily Until rash clear 10/16/23   Bony Castaneda MD   polyethylene glycol (MIRALAX) 17 GM/Dose powder Take 17 g (1 Capful) by mouth every morning As needed for constipation 5/20/24   Bony Castaneda MD   psyllium (METAMUCIL/KONSYL) capsule Take 1 capsule by mouth daily With 4-8 ounce fluid daily 5/20/24 5/20/25  Bony Castaneda MD   SYNTHROID 75 MCG tablet Take 1 tablet (75 mcg) by mouth daily 1/29/24   Bony Castaneda MD   Vitamin D3 (CHOLECALCIFEROL) 125 MCG (5000 UT) tablet Take 1 tablet (125 mcg) by mouth daily 9/14/23    Nighat Menard MD   zinc oxide (DESITIN) 20 % external ointment Apply topically as needed for dry skin or irritation 23   Nighat Menard MD       Scheduled Meds  No current facility-administered medications for this encounter.       Infusion Meds  No current facility-administered medications for this encounter.       PRN Meds  No current facility-administered medications for this encounter.       Allergies   Allergies   Allergen Reactions    Seasonal Allergies      Family History   Family History   Problem Relation Age of Onset    Acute Myocardial Infarction Father     Heart Disease Maternal Grandmother     Dementia Maternal Grandmother     Myocardial Infarction Father     Dementia Paternal Grandmother     Heart Disease Paternal Grandmother      Social History   Social History     Tobacco Use    Smoking status: Former     Current packs/day: 0.00     Average packs/day: 0.5 packs/day for 35.0 years (17.5 ttl pk-yrs)     Types: Cigarettes     Start date: 1983     Quit date: 2018     Years since quittin.9    Smokeless tobacco: Never   Substance Use Topics    Alcohol use: Not Currently    Drug use: Not Currently       Review of Systems   The 10 point Review of Systems is negative other than noted in the HPI or here.        PHYSICAL EXAMINATION  Temp:  [97.6  F (36.4  C)] 97.6  F (36.4  C)  Pulse:  [79-86] 83  Resp:  [16] 16  BP: (165-173)/(80-85) 173/83  SpO2:  [96 %-99 %] 98 %    General:  patient lying in bed without any acute distress    HEENT:  normocephalic/atraumatic  Cardio:  RRR  Pulmonary:  no respiratory distress  Abdomen:  soft, non-tender  Extremities:  no edema  Skin:  intact     Neurologic  Mental Status:  alert, oriented only to self, follows simple commands, speech slow, naming and repetition normal  Cranial Nerves:  visual fields intact, PERRL, EOMI with normal smooth pursuit, facial sensation intact and symmetric, left facial droop with left flattened nasolabial fold,  hearing not formally tested but intact to conversation, mild to moderate dysarthria, shoulder shrug strong bilaterally, tongue protrusion midline  Motor:  normal muscle tone and bulk, no abnormal movements, able to move all limbs spontaneously, strength 5/5 throughout upper and lower extremities, no pronator drift  Reflexes:  toes down-going  Sensory:  light touch sensation intact and symmetric throughout upper and lower extremities, no extinction on double simultaneous stimulation   Coordination:  normal finger-to-nose and heel-to-shin bilaterally without dysmetria  Station/Gait:  deferred    Dysphagia Screen  Dysarthria or facial droop present - Maintain NPO, consult SLP    Stroke Scales    NIHSS  1a. Level of Consciousness 0-->Alert, keenly responsive   1b. LOC Questions 1-->Answers one question correctly   1c. LOC Commands 0-->Performs both tasks correctly   2.   Best Gaze 0-->Normal   3.   Visual 0-->No visual loss   4.   Facial Palsy 1-->Minor paralysis (flattened nasolabial fold, asymmetry on smiling)   5a. Motor Arm, Left 0-->No drift, limb holds 90 (or 45) degrees for full 10 secs   5b. Motor Arm, Right 0-->No drift, limb holds 90 (or 45) degrees for full 10 secs   6a. Motor Leg, Left 0-->No drift, leg holds 30 degree position for full 5 secs   6b. Motor Leg, right 0-->No drift, leg holds 30 degree position for full 5 secs   7.   Limb Ataxia 0-->Absent   8.   Sensory 0-->Normal, no sensory loss   9.   Best Language 1-->Mild-to-moderate aphasia, some obvious loss of fluency or facility of comprehension, without significant limitation on ideas expressed or form of expression. Reduction of speech and/or comprehension, however, makes conversation. . . (see row details)   10. Dysarthria 0-->Normal   11. Extinction and Inattention  0-->No abnormality   Total 3 (06/16/24 8494)       Modified Tyrrell Score (Pre-morbid)  4    Imaging  I personally reviewed all imaging; relevant findings per the HPI.    Lab Results  "Data   CBC  Recent Labs   Lab 06/16/24 0824   WBC 4.5   RBC 3.86   HGB 10.9*   HCT 34.4*        Basic Metabolic Panel   Recent Labs   Lab 06/16/24  1455 06/16/24  1209 06/16/24  0846 06/16/24  0824   NA  --   --   --  139   POTASSIUM  --   --   --  4.2   CHLORIDE  --   --   --  107   CO2  --   --   --  23   BUN  --   --   --  34.4*   CR  --   --  1.9* 1.73*   * 143*  --  368*   VERONICA  --   --   --  8.8     Liver Panel  No results for input(s): \"PROTTOTAL\", \"ALBUMIN\", \"BILITOTAL\", \"ALKPHOS\", \"AST\", \"ALT\", \"BILIDIRECT\" in the last 168 hours.  INR    Recent Labs   Lab Test 06/16/24  0824 03/07/24  0111 03/07/24  0109   INR 0.98 1.1* 0.99      Lipid Profile    Recent Labs   Lab Test 06/16/24  0824 05/20/23  1312 03/29/23  1158   CHOL 134 136 141   HDL 43* 35* 48*   LDL 67 71 72   TRIG 119 152* 105     A1C    Recent Labs   Lab Test 05/20/24  1538 01/29/24  1655 09/04/23  0228   A1C 10.2* 10.7* 9.3*     Troponin    Recent Labs   Lab 06/16/24  1236 06/16/24 0824   CTROPT 31* 33*          Stroke Code / Stroke Consult Data Data    Not a stroke code       "

## 2024-06-17 ENCOUNTER — APPOINTMENT (OUTPATIENT)
Dept: OCCUPATIONAL THERAPY | Facility: CLINIC | Age: 64
DRG: 065 | End: 2024-06-17
Payer: COMMERCIAL

## 2024-06-17 ENCOUNTER — APPOINTMENT (OUTPATIENT)
Dept: PHYSICAL THERAPY | Facility: CLINIC | Age: 64
DRG: 065 | End: 2024-06-17
Payer: COMMERCIAL

## 2024-06-17 ENCOUNTER — APPOINTMENT (OUTPATIENT)
Dept: CARDIOLOGY | Facility: CLINIC | Age: 64
DRG: 065 | End: 2024-06-17
Payer: COMMERCIAL

## 2024-06-17 ENCOUNTER — APPOINTMENT (OUTPATIENT)
Dept: SPEECH THERAPY | Facility: CLINIC | Age: 64
DRG: 065 | End: 2024-06-17
Payer: COMMERCIAL

## 2024-06-17 LAB
ANION GAP SERPL CALCULATED.3IONS-SCNC: 10 MMOL/L (ref 7–15)
BUN SERPL-MCNC: 25.5 MG/DL (ref 8–23)
CALCIUM SERPL-MCNC: 8.6 MG/DL (ref 8.8–10.2)
CHLORIDE SERPL-SCNC: 107 MMOL/L (ref 98–107)
CREAT SERPL-MCNC: 1.38 MG/DL (ref 0.51–0.95)
DEPRECATED HCO3 PLAS-SCNC: 23 MMOL/L (ref 22–29)
EGFRCR SERPLBLD CKD-EPI 2021: 42 ML/MIN/1.73M2
ERYTHROCYTE [DISTWIDTH] IN BLOOD BY AUTOMATED COUNT: 14.5 % (ref 10–15)
GLUCOSE BLDC GLUCOMTR-MCNC: 106 MG/DL (ref 70–99)
GLUCOSE BLDC GLUCOMTR-MCNC: 170 MG/DL (ref 70–99)
GLUCOSE BLDC GLUCOMTR-MCNC: 185 MG/DL (ref 70–99)
GLUCOSE BLDC GLUCOMTR-MCNC: 241 MG/DL (ref 70–99)
GLUCOSE BLDC GLUCOMTR-MCNC: 243 MG/DL (ref 70–99)
GLUCOSE BLDC GLUCOMTR-MCNC: 267 MG/DL (ref 70–99)
GLUCOSE BLDC GLUCOMTR-MCNC: 436 MG/DL (ref 70–99)
GLUCOSE BLDC GLUCOMTR-MCNC: 456 MG/DL (ref 70–99)
GLUCOSE BLDC GLUCOMTR-MCNC: 48 MG/DL (ref 70–99)
GLUCOSE BLDC GLUCOMTR-MCNC: 482 MG/DL (ref 70–99)
GLUCOSE BLDC GLUCOMTR-MCNC: 52 MG/DL (ref 70–99)
GLUCOSE SERPL-MCNC: 210 MG/DL (ref 70–99)
HCT VFR BLD AUTO: 35.6 % (ref 35–47)
HGB BLD-MCNC: 11.4 G/DL (ref 11.7–15.7)
LVEF ECHO: NORMAL
MCH RBC QN AUTO: 28.2 PG (ref 26.5–33)
MCHC RBC AUTO-ENTMCNC: 32 G/DL (ref 31.5–36.5)
MCV RBC AUTO: 88 FL (ref 78–100)
PLATELET # BLD AUTO: 197 10E3/UL (ref 150–450)
POTASSIUM SERPL-SCNC: 3.8 MMOL/L (ref 3.4–5.3)
RBC # BLD AUTO: 4.04 10E6/UL (ref 3.8–5.2)
SODIUM SERPL-SCNC: 140 MMOL/L (ref 135–145)
WBC # BLD AUTO: 7.8 10E3/UL (ref 4–11)

## 2024-06-17 PROCEDURE — 97116 GAIT TRAINING THERAPY: CPT | Mod: GP

## 2024-06-17 PROCEDURE — 250N000012 HC RX MED GY IP 250 OP 636 PS 637: Performed by: STUDENT IN AN ORGANIZED HEALTH CARE EDUCATION/TRAINING PROGRAM

## 2024-06-17 PROCEDURE — 99222 1ST HOSP IP/OBS MODERATE 55: CPT | Mod: GC | Performed by: STUDENT IN AN ORGANIZED HEALTH CARE EDUCATION/TRAINING PROGRAM

## 2024-06-17 PROCEDURE — 86341 ISLET CELL ANTIBODY: CPT | Performed by: STUDENT IN AN ORGANIZED HEALTH CARE EDUCATION/TRAINING PROGRAM

## 2024-06-17 PROCEDURE — 92610 EVALUATE SWALLOWING FUNCTION: CPT | Mod: GN

## 2024-06-17 PROCEDURE — 999N000208 ECHOCARDIOGRAM COMPLETE

## 2024-06-17 PROCEDURE — 120N000002 HC R&B MED SURG/OB UMMC

## 2024-06-17 PROCEDURE — 92526 ORAL FUNCTION THERAPY: CPT | Mod: GN

## 2024-06-17 PROCEDURE — 80048 BASIC METABOLIC PNL TOTAL CA: CPT

## 2024-06-17 PROCEDURE — 99255 IP/OBS CONSLTJ NEW/EST HI 80: CPT | Performed by: STUDENT IN AN ORGANIZED HEALTH CARE EDUCATION/TRAINING PROGRAM

## 2024-06-17 PROCEDURE — 97110 THERAPEUTIC EXERCISES: CPT | Mod: GO

## 2024-06-17 PROCEDURE — 84681 ASSAY OF C-PEPTIDE: CPT | Performed by: STUDENT IN AN ORGANIZED HEALTH CARE EDUCATION/TRAINING PROGRAM

## 2024-06-17 PROCEDURE — 97165 OT EVAL LOW COMPLEX 30 MIN: CPT | Mod: GO

## 2024-06-17 PROCEDURE — 250N000011 HC RX IP 250 OP 636

## 2024-06-17 PROCEDURE — 93306 TTE W/DOPPLER COMPLETE: CPT | Mod: 26 | Performed by: INTERNAL MEDICINE

## 2024-06-17 PROCEDURE — 85027 COMPLETE CBC AUTOMATED: CPT

## 2024-06-17 PROCEDURE — 250N000013 HC RX MED GY IP 250 OP 250 PS 637

## 2024-06-17 PROCEDURE — 36415 COLL VENOUS BLD VENIPUNCTURE: CPT | Performed by: STUDENT IN AN ORGANIZED HEALTH CARE EDUCATION/TRAINING PROGRAM

## 2024-06-17 PROCEDURE — 36415 COLL VENOUS BLD VENIPUNCTURE: CPT

## 2024-06-17 PROCEDURE — 82962 GLUCOSE BLOOD TEST: CPT

## 2024-06-17 PROCEDURE — 97530 THERAPEUTIC ACTIVITIES: CPT | Mod: GP

## 2024-06-17 PROCEDURE — 97161 PT EVAL LOW COMPLEX 20 MIN: CPT | Mod: GP

## 2024-06-17 PROCEDURE — 250N000011 HC RX IP 250 OP 636: Mod: JZ

## 2024-06-17 RX ORDER — DEXTROSE MONOHYDRATE 25 G/50ML
25-50 INJECTION, SOLUTION INTRAVENOUS
Status: DISCONTINUED | OUTPATIENT
Start: 2024-06-17 | End: 2024-06-18 | Stop reason: HOSPADM

## 2024-06-17 RX ORDER — NICOTINE POLACRILEX 4 MG
15-30 LOZENGE BUCCAL
Status: DISCONTINUED | OUTPATIENT
Start: 2024-06-17 | End: 2024-06-18 | Stop reason: HOSPADM

## 2024-06-17 RX ORDER — CLOPIDOGREL BISULFATE 75 MG/1
300 TABLET ORAL ONCE
Status: COMPLETED | OUTPATIENT
Start: 2024-06-17 | End: 2024-06-17

## 2024-06-17 RX ORDER — CLOPIDOGREL BISULFATE 75 MG/1
75 TABLET ORAL DAILY
Status: DISCONTINUED | OUTPATIENT
Start: 2024-06-18 | End: 2024-06-18 | Stop reason: HOSPADM

## 2024-06-17 RX ORDER — ASPIRIN 81 MG/1
81 TABLET ORAL DAILY
Status: DISCONTINUED | OUTPATIENT
Start: 2024-06-17 | End: 2024-06-18 | Stop reason: HOSPADM

## 2024-06-17 RX ORDER — CARVEDILOL 6.25 MG/1
6.25 TABLET ORAL 2 TIMES DAILY WITH MEALS
Status: DISCONTINUED | OUTPATIENT
Start: 2024-06-17 | End: 2024-06-18

## 2024-06-17 RX ORDER — CEFTRIAXONE 1 G/1
1 INJECTION, POWDER, FOR SOLUTION INTRAMUSCULAR; INTRAVENOUS EVERY 24 HOURS
Status: DISCONTINUED | OUTPATIENT
Start: 2024-06-17 | End: 2024-06-18

## 2024-06-17 RX ADMIN — LEVETIRACETAM 500 MG: 5 INJECTION INTRAVENOUS at 20:37

## 2024-06-17 RX ADMIN — ATORVASTATIN CALCIUM 40 MG: 40 TABLET, FILM COATED ORAL at 12:04

## 2024-06-17 RX ADMIN — GABAPENTIN 100 MG: 100 CAPSULE ORAL at 21:12

## 2024-06-17 RX ADMIN — HEPARIN SODIUM 5000 UNITS: 5000 INJECTION, SOLUTION INTRAVENOUS; SUBCUTANEOUS at 16:21

## 2024-06-17 RX ADMIN — DEXTROSE 15 G: 15 GEL ORAL at 01:17

## 2024-06-17 RX ADMIN — CLOPIDOGREL BISULFATE 300 MG: 75 TABLET ORAL at 12:01

## 2024-06-17 RX ADMIN — ASPIRIN 81 MG: 81 TABLET ORAL at 12:02

## 2024-06-17 RX ADMIN — INSULIN GLARGINE 10 UNITS: 100 INJECTION, SOLUTION SUBCUTANEOUS at 17:38

## 2024-06-17 RX ADMIN — Medication 1 MG: at 01:57

## 2024-06-17 RX ADMIN — CEFTRIAXONE SODIUM 1 G: 1 INJECTION, POWDER, FOR SOLUTION INTRAMUSCULAR; INTRAVENOUS at 18:18

## 2024-06-17 RX ADMIN — HEPARIN SODIUM 5000 UNITS: 5000 INJECTION, SOLUTION INTRAVENOUS; SUBCUTANEOUS at 05:56

## 2024-06-17 RX ADMIN — HEPARIN SODIUM 5000 UNITS: 5000 INJECTION, SOLUTION INTRAVENOUS; SUBCUTANEOUS at 21:12

## 2024-06-17 RX ADMIN — INSULIN ASPART 3 UNITS: 100 INJECTION, SOLUTION INTRAVENOUS; SUBCUTANEOUS at 15:04

## 2024-06-17 RX ADMIN — CARVEDILOL 6.25 MG: 6.25 TABLET, FILM COATED ORAL at 18:18

## 2024-06-17 RX ADMIN — LEVETIRACETAM 500 MG: 5 INJECTION INTRAVENOUS at 07:50

## 2024-06-17 RX ADMIN — INSULIN ASPART 1 UNITS: 100 INJECTION, SOLUTION INTRAVENOUS; SUBCUTANEOUS at 21:17

## 2024-06-17 RX ADMIN — DULOXETINE HYDROCHLORIDE 20 MG: 20 CAPSULE, DELAYED RELEASE ORAL at 12:04

## 2024-06-17 ASSESSMENT — ACTIVITIES OF DAILY LIVING (ADL)
ADLS_ACUITY_SCORE: 44
ADLS_ACUITY_SCORE: 42
ADLS_ACUITY_SCORE: 42
ADLS_ACUITY_SCORE: 44
DEPENDENT_IADLS:: COOKING;CLEANING;LAUNDRY;SHOPPING;MEAL PREPARATION;MEDICATION MANAGEMENT;MONEY MANAGEMENT;TRANSPORTATION;INCONTINENCE
ADLS_ACUITY_SCORE: 44
ADLS_ACUITY_SCORE: 42
ADLS_ACUITY_SCORE: 42
ADLS_ACUITY_SCORE: 44

## 2024-06-17 NOTE — PROGRESS NOTES
Care Management Initial Consult    General Information  Assessment completed with: Patient, Children, Breeann  Type of CM/SW Visit: Initial Assessment    Primary Care Provider verified and updated as needed: Yes   Readmission within the last 30 days: no previous admission in last 30 days Reason for Consult: discharge planning  Advance Care Planning:          Communication Assessment  Patient's communication style: spoken language (English or Bilingual)        Cognitive  Cognitive/Neuro/Behavioral: .WDL except, speech  Level of Consciousness: intermittent confusion  Arousal Level: opens eyes spontaneously  Orientation: oriented x 4  Mood/Behavior: flat affect  Best Language: 0 - No aphasia  Speech: clear, spontaneous    Living Environment:   People in home: child(tiffany), adult  Breeann  Current living Arrangements: apartment      Able to return to prior arrangements: yes     Family/Social Support:  Care provided by: child(tiffany), other (see comments) (PCA)  Provides care for: no one, unable/limited ability to care for self     Children          Description of Support System: Involved, Supportive    Support Assessment: Adequate family and caregiver support    Current Resources:   Patient receiving home care services: No     Community Resources: PCA  Equipment currently used at home: walker, rolling, wheelchair, manual  Supplies currently used at home: Incontinence Supplies, Diabetic Supplies    Employment/Financial:  Employment Status:          Financial Concerns:     Referral to Financial Worker: No     Does the patient's insurance plan have a 3 day qualifying hospital stay waiver?  Yes     Which insurance plan 3 day waiver is available? Alternative insurance waiver    Will the waiver be used for post-acute placement? No    Lifestyle & Psychosocial Needs:  Social Determinants of Health     Food Insecurity: Low Risk  (1/29/2024)    Food Insecurity     Within the past 12 months, did you worry that your food would run out  before you got money to buy more?: No     Within the past 12 months, did the food you bought just not last and you didn t have money to get more?: No   Depression: Not at risk (5/20/2024)    PHQ-2     PHQ-2 Score: 1   Housing Stability: Low Risk  (1/29/2024)    Housing Stability     Do you have housing? : Yes     Are you worried about losing your housing?: No   Tobacco Use: Medium Risk (5/20/2024)    Patient History     Smoking Tobacco Use: Former     Smokeless Tobacco Use: Never     Passive Exposure: Not on file   Financial Resource Strain: Low Risk  (1/29/2024)    Financial Resource Strain     Within the past 12 months, have you or your family members you live with been unable to get utilities (heat, electricity) when it was really needed?: No   Alcohol Use: Not on file   Transportation Needs: Low Risk  (1/29/2024)    Transportation Needs     Within the past 12 months, has lack of transportation kept you from medical appointments, getting your medicines, non-medical meetings or appointments, work, or from getting things that you need?: No   Physical Activity: Not on file   Interpersonal Safety: Not on file   Stress: Not on file   Social Connections: Not on file   Health Literacy: Not on file     Functional Status:  Prior to admission patient needed assistance:   Dependent ADLs:: Ambulation-walker, Wheelchair-with assist, Toileting, Incontinence, Dressing, Bathing, Transfers, Grooming  Dependent IADLs:: Cooking, Cleaning, Laundry, Shopping, Meal Preparation, Medication Management, Money Management, Transportation, Incontinence     Mental Health Status:  Mental Health Status: No Current Concerns       Chemical Dependency Status:  Chemical Dependency Status: No Current Concerns           Values/Beliefs:  Spiritual, Cultural Beliefs, Tenriism Practices, Values that affect care: no             Additional Information:  Met with patient and patient's daughterVishal to complete initial care management assessment.  Patient currently lives in an apartment in [place] with her daughter. Breeann states patient requires assistance with all ADLs/IADLs. Vishal is patient's PCA for 40 hours/week and she has another PCA for 15 additional hours a week. Patient has a walker, wheelchair, incontinent supplies and diabetic supplies at home. She denies current skilled home care services. Vishal states she is on an elderly waiver. She states her  is Karla Burkett ph: [phone].     Discussed potential home care nursing follow up to help manage medications and perform post hospital assessments. Patient/dtr are agreeable to home care and would prefer to use [facility]. A referral for home RN sent to [facility] with preference noted.     Daughter states she hopes to get moms meals arranged through insurance following discharge. CHW assisted in looking into moms meals and provided information to daughter.     Daughter states they have been paying out of pocket for incontinent supplies and are hoping to get these arranged through insurance. She does not have a preference on DME supplier. Provider paged and entered orders for briefs and chux pads. Call placed to [facility] to inform of order, waiting call back.     Daughter would like RNCC to connect with  regarding above services/supplies. Call placed to Karla Burkett, she is out of the office on Monday. Voicemail left requesting a call back.     RNCC will continue to follow and assist with discharge planning.    [facility] - referral for home RN sent 6/17    [facility] - incontinence briefs/pad orders called in 6/17, waiting callback  Ph: [phone]  Fax: [phone]    Andie Spencer, RN, BSN  6A RN Care Coordinator  Ph: [phone]   Vocera: 6A Neuro RNCC

## 2024-06-17 NOTE — CONSULTS
"  Inpatient Diabetes Management Service : New Consult Note     Patient: Isabell Matthews   YOB: 1960    MRN: [id number]     Date of Consult : 06/17/2024   Date of Admission: 6/16/2024     Reason for Consult: \"fluatuating glucose, A1c 10.2\"   Consult Requestor: Jess Nair MD            History of Present Illness:   Isabell Matthews is a 63 year old female with type 2 vs type 1 diabetes mellitus (insulin dependent? Hx of multiple hospitalizations for DKA), diabetic polyneuropathy, vascular vs Alzheimer dementia, multiple prior strokes (right basal ganglia ischemic infarct in 2018, left basal ganglia hemorrhagic stroke 2021, right centrum semiovale & right chuck 2023), history of seizures, hypertension, hyperlipidemia, chronic kidney disease, recurrent UTI, presented 6/16/2024 with sudden onset left-sided facial droop and right-sided weakness/incoordination. Found to have  acute/subacute right insular and right frontal operculum infarct 2/2 ESUS vs ICAD.    Additional Historian:  daughter and caregiver, Maria Guadalupe (all history gathered from daughter, patient has dementia and is unable to verbalize her history). Patient has 24/7 care between daughter and other caregivers.     Patient is known to the Inpatient Diabetes Service from past admission(s). Last seen 9/6/2023.    Last dose insulin PTA: Lantus 20 units at 10PM Saturday 6/15. Humalog 4 units at 8AM Sunday 6/16.   Current inpatient regimen:  none (Lantus 5 units HS ordered for tonight, none given 6/16)   BG at time of consult: 170 mg/dL    Other Active/Contributory Medical Problems: dementia, stroke.   Planned Procedures/Surgeries: none    Relevant Labs on Admission:  if contributory, otherwise see labs below  Renal function: Creatinine (1.73), eGFR (32)    BMP: Na (139), K (4.2) Bicarb (23), Anion Gap (9), Glucose (368)  BhB: <0.18   Hgb: 10.9       GAD65 antibody, islet antibody, insulin antibody, and c-peptide not available on epic search     Inpatient " Glucose Trends:           Diabetes History:   Diabetes Type and Duration: DM for > 40 years, diagnosed in her 40s. Per daughter,  initially told she has T2DM then T1DM for a while then flipped back to being told T2DM.   - [facility]  - CT abdomen/pelvis 6/16/2023 - pancreas unremarkable   - No known hx of pancreatitis per daughter     PTA Medication Regimen: Daughter dials insulin for patient, patient injects.  - Lantus 20 units daily HS  - Humalog, daughter varies dose based on patient's BG and what she is eating. Follows correction scale 1/100 >200 before meals and at bedtime. Usually daughter gives total Humalog of 2-4 units with meals which includes mealtime dose + correction scale.    Historical Diabetes Medications: metformin at diagnosis wasn't working, started insulin shortly after diagnosis.    Glucose monitoring device and frequency: glucometer before meals and at bedtime. Has tried and failed multiple CGMs (issues with accuracy or patient picking CGM off). Fasting BG usually around 150s, highly variable control throughout the day - sometimes up to 400s but will drop quickly with insulin. Low BG <70 about once every 2 weeks, sometimes multiple times a week and most often lows occur fasting AM. Hx of severe low a few months ago 2/2 patient grabbing Novolog and injecting 20 units (daughter now keeps insulin out of patient's reach)    Outpatient Diabetes Provider: PCP - Bony Castaneda (last visit 5/20/2024)    Formal Diabetes Education/Educator: no    ------------------------  Complications:  + peripheral neuropathy on gabapentin, no known retinopathy albeit last eye exam: >4 yrs ago, PCP just placed referral for optho  5/20/24, +nephropathy (CKD), no gastroparesis, +macrovascular disease  (hx of MI and multiple strokes)     Last A1c: 10.2% (5/20/2024)     Hemoglobin at time of last A1c: low, 11.1 (5/20/2024)  RBC transfusion in past 3 months: none      History of  DKA: yes, 2011, 2020 and 2022    Hypoglycemia PTA: as above.     Safety Kit:   - Glucagon: no, daughter would like.    - Ketone Strips: no    Diet/Lifestyle: 3 meals a day, daughter cooks meals for her.   Ability to [place] Prescribed Regimen:  daughter manages   Food/Housing Insecurity: no         Medications Impacting Glycemia:    Steroids: none  D5W containing solutions/medications: none  Other medications impacting glucose: none         Diet/Nutrition:    Orders Placed This Encounter      NPO for Medical/Clinical Reasons Except for: No Exceptions, Meds  Supplements:  none  TF: none  TPN: none          Review of Systems:    Constitutional: No fever or chills.   Eyes: No vision changes.   Cardiac:  No chest pain or palpitations.   Respiratory:  No shortness of breath or cough.     GI:  No abdominal pain, nausea, emesis, diarrhea, constipation  Neuro:  No numbness, tingling, or burning pain in feet/hands.   MSK/Integumentary: No swelling/edema. No rash, non-healing sores, or lipohypertrophy at injection sites.   Endocrine: No polyuria or polydipsia.          Past Medical History:       Past Medical History:   Diagnosis Date    Chronic pain     Coronary atherosclerosis 6/14/2016    Essential hypertension     Ex-cigarette smoker     quit 7/2018 over 35 years    Ex-cigarette smoker     Hyperlipidemia     Hypothyroid     Seizures (H)     Stroke (H)     Vascular dementia (H)              Past Surgical History:      Past Surgical History:   Procedure Laterality Date    athroplasty hip Bilateral     2003, 2006    OTHER SURGICAL HISTORY      athroplasty hip    PICC DOUBLE LUMEN PLACEMENT Right 06/11/2023    right basilic 5 fr dl power picc 39 cm    STENT               Social History:      Social History     Tobacco Use    Smoking status: Former     Current packs/day: 0.00     Average packs/day: 0.5 packs/day for 35.0 years (17.5 ttl pk-yrs)     Types: Cigarettes     Start date: 7/1/1983     Quit date: 7/1/2018     Years  since quittin.9    Smokeless tobacco: Never   Substance Use Topics    Alcohol use: Not Currently        History   Sexual Activity    Sexual activity: Not Currently             Family History:      Family History   Problem Relation Age of Onset    Acute Myocardial Infarction Father     Heart Disease Maternal Grandmother     Dementia Maternal Grandmother     Myocardial Infarction Father     Dementia Paternal Grandmother     Heart Disease Paternal Grandmother              Physical Exam:    BP (!) 178/97 (BP Location: Left arm)   Pulse 89   Temp 98.3  F (36.8  C) (Oral)   Resp 16   SpO2 99%    General:  well appearing, NAD, resting comfortably in bed.  Lungs: unlabored breathing on RA.  ABD:  rounded, soft, non-tender  Skin:  warm and dry, no obvious lesions  MSK:   moving all extremities  Lymp:   no LE edema  Feet:  warm and dry. no open sores/wounds.  Mental Status:  pleasantly confused, brief answers to questions.   Psych:   confused              Laboratory Data:      Lab Results   Component Value Date    A1C 10.2 (H) 2024    A1C 10.7 (H) 2024    A1C 9.3 (H) 2023    A1C 10.8 (H) 2023    A1C 11.2 (H) 2023    A1C 7.8 (H) 2021    A1C 11.7 (H) 2020     Recent Labs   Lab Test 24  0529   WBC 7.8   RBC 4.04   HGB 11.4*   HCT 35.6   MCV 88   MCH 28.2   MCHC 32.0   RDW 14.5        Recent Labs   Lab Test 24  0735 24  0601 24  0529 24  1209 24  0846 24  0824   NA  --   --  140  --   --  139   POTASSIUM  --   --  3.8  --   --  4.2   CHLORIDE  --   --  107  --   --  107   CO2  --   --  23  --   --  23   ANIONGAP  --   --  10  --   --  9   * 185* 210*   < >  --  368*   BUN  --   --  25.5*  --   --  34.4*   CR  --   --  1.38*  --  1.9* 1.73*   VERONICA  --   --  8.6*  --   --  8.8    < > = values in this interval not displayed.     Recent Labs   Lab Test 24  1538   PROTTOTAL 6.5   ALBUMIN 3.8   BILITOTAL 0.2   ALKPHOS 150    AST 21   ALT 17            Assessment and Recommendations:       Assessment:   Type 2 vs Type 1 Diabetes Mellitus, complicated by peripheral neuropathy, nephropathy, hx of DKA and hypoglycemia. Sub-optimal control, last A1c 10.2% (5/20/2024).   Dementia - daughter is caregiver and manages diabetes for patient.     Plan/Recommendations:    - Start Lantus 10 units q 24 hrs at 1600 (weight-based dose)   - Start Novolog Meal Coverage: 1 units per 30 g CHO, TID AC and PRN with snacks/supplements   - Start Novolog Correction Scale: custom low resistance 1/100 >200 TID AC and HS.   - BG monitoring: TID AC, HS, 0200  - C-peptide and DEMETRIA AB ordered 6/17, results pending.   - Hypoglycemia protocol    - Carb counting protocol     Discussion:  Unclear diabetes history, no C-peptide or DEMETRIA AB found in chart review to help support diagnosis of type 1 diabetes, ordered both labs today. Long-term insulin use with highly variable BG control at home per patient's daughter who manages patient's diabetes. Unclear why patient had hypoglycemic episode overnight despite last dose of Lantus 20 units given >24 hrs prior, 10pm 6/15 PTA and Novolog 4 units give at 8AM yesterday, 6/16 . Discussed case with staff endocrinologist. Possible to see hypoglycemia with poor renal function albeit patient's renal function did not drastically change from baseline on admission. Also considered inaccurate finger-stick BG check in setting of vasculopathy. Patient has known history of DKA and takes Lantus 20 units daily at home without frequent hypoglycemia. Therefore, will start lower-dose Lantus today and low-dose Novolog ICR plus correction scale to help avoid rebound hyperglycemia/DKA. If c-peptide result is normal, will consider use of non-insulin diabetes medications such as Tradjenta in place of mealtime insulin.     ADDENDUM: 1750 - spoke with RN, repeat  mg/dL. Will add 1 time order of Novolog 3 units to be given STAT at 1800. Per RN,  patient ate low-carb dinner of only 6 grams of carbohydrates.     Discharge Planning: (tentative)    Medications: TBD. Daughter would like glucagon at discharge. Not interested in re-trying CGM.   Test Claims: ordered 6/17 for glucagon.   Education:  Likely none, patient's daughter manages insulins and BG checks.   Outpatient Follow-up: patient's daughter prefers to continue to follow with PCP - Bony Castaneda      Thank you for this consult. IDS will continue to follow.      Please notify Inpatient Diabetes Service if changes are planned to steroids, nutrition, TPN/TF and anticipated procedures requiring prolonged NPO status.     Karrie Flood PA-C  Inpatient Diabetes Service  Pager: [phone]  Available on Verafin    To contact Inpatient Diabetes Service:     7 AM - 5 PM: Page the IDS BULMARO following the patient that day (see filed or incomplete progress notes/consult notes under Endocrinology)    OR if uncertain of provider assignment: page job code [phone]    5 PM - 7 AM: First call after hours is to primary service.    For urgent after-hours questions, page job code for on call fellow: [phone]     I spent a total of  85 minutes on the date of the encounter doing prep/post-work, chart review, history and exam, documentation and further activities per the note including lab review, multidisciplinary communication, counseling the patient and/or coordinating care regarding acute hyper/hypoglycemic management, as well as discharge management and planning/communication.  See note for details.

## 2024-06-17 NOTE — PROGRESS NOTES
06/17/24 0800   Appointment Info   Signing Clinician's Name / Credentials (SLP) Kelly Hernandez MA CCC-SLP   General Information   Onset of Illness/Injury or Date of Surgery 06/16/24   Referring Physician Luiz Damico MD   Pertinent History of Current Problem Isabell Matthews is a 63 year old female with diabetes mellitus, diabetic polyneuropathy, 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 Per patient's daughter, her last known well was 10:30 PM last night before going to bed. This morning around 7:30 AM daughter noticed that patient is having significant left facial droop with drooling, slurred speech, and some difficulty moving the right side of her body while attempting to use her walker. At the ED she continued to have slurred speech, and left facial droop. At baseline, patient has some mild left facial droop and dysarthria, dysphagia due to prior strokes. She walks with a walker due to gait imbalances. 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       Present no   Pain Assessment   Patient Currently in Pain No   Type of Evaluation   Type of Evaluation Swallow Evaluation   Oral Motor   Oral Musculature generally intact   Structural Abnormalities none present   Oral Motor Deficits Observed Dentition (Oral Motor) (Group)   Dentition (Oral Motor)   Dentition (Oral Motor) some missing teeth   Facial Symmetry (Oral Motor)   Facial Symmetry (Oral Motor) WNL   Lip Function (Oral Motor)   Lip Range of Motion (Oral Motor) WNL   Tongue Function (Oral Motor)   Tongue  Coordination/Speed (Oral Motor) WNL   Tongue ROM (Oral Motor) WNL   Cough/Swallow/Gag Reflex (Oral Motor)   Volitional Throat Clear/Cough (Oral Motor) WNL   Volitional Swallow (Oral Motor) WNL   Vocal Quality/Secretion Management (Oral Motor)   Vocal Quality (Oral Motor) WNL   General Swallowing Observations   Past History of Dysphagia Pt is well known to our SLP services and per previous assessment (9/2023) had been on a baseline diet of puree and thin liquids, but pt reports she eats regular and thin liquids diet at baseline.   Respiratory Support room air   Current Diet/Method of Nutritional Intake (General Swallowing Observations, NIS) NPO   Swallowing Evaluation Clinical swallow evaluation   Clinical Swallow Evaluation   Feeding Assistance minimal assistance required   Clinical Swallow Evaluation Textures Trialed thin liquids;pureed;solid foods   Clinical Swallow Eval: Thin Liquid Texture Trial   Mode of Presentation, Thin Liquids self-fed;spoon;cup;straw   Volume of Liquid or Food Presented 3 oz   Oral Phase of Swallow WFL   Pharyngeal Phase of Swallow intact   Diagnostic Statement No overt s/sx of aspiration; Slight oral hold but pt reports she does this at baseline.   Clinical Swallow Evaluation: Puree Solid Texture Trial   Mode of Presentation, Puree self-fed   Volume of Puree Presented 2 tablespoons   Oral Phase, Puree WFL   Pharyngeal Phase, Puree intact   Diagnostic Statement No overt s/sx of aspiration   Clinical Swallow Evaluation: Solid Food Texture Trial   Mode of Presentation self-fed   Volume Presented 1 hailey cracker   Oral Phase impaired mastication;delayed AP movement   Pharyngeal Phase intact   Diagnostic Statement No overt s/sx of aspiration.   Esophageal Phase of Swallow   Patient reports or presents with symptoms of esophageal dysphagia No   Swallowing Recommendations   Diet Consistency Recommendations thin liquids (level 0);soft & bite-sized (level 6)   Supervision Level for Intake 1:1  supervision needed   Mode of Delivery Recommendations bolus size, small;slow rate of intake   Swallowing Maneuver Recommendations alternate food and liquid intake   Monitoring/Assistance Required (Eating/Swallowing) stop eating activities when fatigue is present   Recommended Feeding/Eating Techniques (Swallow Eval) patient is independent, no specific recommendations   Medication Administration Recommendations, Swallowing (SLP) Orally   Instrumental Assessment Recommendations instrumental evaluation not recommended at this time   General Therapy Interventions   Planned Therapy Interventions Dysphagia Treatment   Dysphagia treatment Modified diet education;Instruction of safe swallow strategies   Clinical Impression   Criteria for Skilled Therapeutic Interventions Met (SLP Eval) Yes, treatment indicated   SLP Diagnosis Mild oropharyngeal swallowing deficits   Risks & Benefits of therapy have been explained evaluation/treatment results reviewed;care plan/treatment goals reviewed;risks/benefits reviewed;current/potential barriers reviewed;participants voiced agreement with care plan;participants included;patient   Clinical Impression Comments Clinical swallow evaluation completed this AM per MD order.  Pt presents with mild oropharyngeal swallowing impairments in setting of recent TIA.  Pt presents with mild oropharyngeal swallowing impairments.  Oral mech remarkable for some missing teeth and mild-moderate dysarthria unclear if baseline d/t previous strokes.  Pt assessed with thin liquid via spoon, cup and straw, puree and regular solids.  Oral phase c/b oral hold with thin liquids (pt states she does this at baseline) and mildly prolonged but effective mastication of regular solids.  Recommend soft and bite sized diet (IDDSI 6) and thin liquids (IDDSI 0) with 1:1 supervision.  Recommend medications as tolerated.  Speech to follow for dysphagia management.   Swallowing Dysfunction &/or Oral Function for Feeding    Treatment of Swallowing Dysfunction &/or Oral Function for Feeding Minutes (85268) 5   Symptoms Noted During/After Treatment None   Treatment Detail/Skilled Intervention Clinical swallow evaluation completed per MD order.  Pt educated on anatomy and physiology of swallowing mechanism, s/sx of aspiration and aspiration-related complications and safe swallowing strategies.  Pt verbalized understanding.   SLP Discharge Planning   SLP Plan Assess diet tolerance, advance as appropriate, assess need for dysarthria evaluation.   SLP Discharge Recommendation home with assist   SLP Rationale for DC Rec Suspect pt will meet swallowing goals prior to d/c   SLP Brief overview of current status  Recommend soft and bite sized diet (IDDSI 6) and thin liquids (IDDSI 0) with 1:1 supervision. Recommend medications as tolerated. Speech to follow for dysphagia management.

## 2024-06-17 NOTE — PROGRESS NOTES
IP PT Evaluation:     06/17/24 0928   Appointment Info   Signing Clinician's Name / Credentials (PT) Geovany Garcia, PT, DPT   Living Environment   People in Home child(tiffany), adult   Current Living Arrangements apartment   Home Accessibility no concerns   Transportation Anticipated family or friend will provide   Living Environment Comments Pt limited historian, per SW note 9/23: Patient lives in a senior living building ([facility] community) in an apartment with her daughter Mel. Mel is one of the patients PCA's, the patient also has another PCA that comes into the home 30 hrs a week.   Self-Care   Equipment Currently Used at Home walker, rolling   Fall history within last six months no  (Pt reports none, but per chart review has hx of falls)   Activity/Exercise/Self-Care Comment Ambulates in their apartment using FWW. Manual wc in community. Daughter/PCA assists with I/ADLs.   General Information   Onset of Illness/Injury or Date of Surgery 06/16/24   Referring Physician Luiz Damico MD   Patient/Family Therapy Goals Statement (PT) Return home   Pertinent History of Current Problem (include personal factors and/or comorbidities that impact the POC) Per EMR:   Isabell Matthews is a 63 year old female with diabetes mellitus, diabetic polyneuropathy, 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   Existing Precautions/Restrictions fall   General Observations Pt supine, agreeable to session   Cognition   Affect/Mental Status (Cognition) flat/blunted affect   Posture    Posture Forward head position;Protracted shoulders;Kyphosis   Range of Motion (ROM)   Range of  Motion ROM is WFL   Strength (Manual Muscle Testing)   Strength (Manual Muscle Testing) strength is WFL   Strength Comments Grossly deconditioned; no focal deficits noted   Bed Mobility   Comment, (Bed Mobility) SBA supine>sit   Transfers   Comment, (Transfers) Danny sit<>stand   Gait/Stairs (Locomotion)   Comment, (Gait/Stairs) CGA, FWW   Balance   Balance Comments Requires BUE support in standing   Sensory Examination   Sensory Perception patient reports no sensory changes   Sensory Perception Comments light touch intact; proprioception intact   Coordination   Coordination no deficits were identified   Coordination Comments Intact HTS and FTN   Clinical Impression   Criteria for Skilled Therapeutic Intervention Yes, treatment indicated   PT Diagnosis (PT) impaired functional mobility   Influenced by the following impairments deconditioning   Functional limitations due to impairments activity tolerance, transfers, gait, balance   Clinical Presentation (PT Evaluation Complexity) stable   Clinical Presentation Rationale Clinical judgment   Clinical Decision Making (Complexity) low complexity   Planned Therapy Interventions (PT) balance training;home exercise program;gait training;patient/family education;stair training;strengthening;transfer training;progressive activity/exercise;risk factor education;home program guidelines   Risk & Benefits of therapy have been explained evaluation/treatment results reviewed;care plan/treatment goals reviewed;risks/benefits reviewed;current/potential barriers reviewed;participants voiced agreement with care plan;participants included;patient   PT Total Evaluation Time   PT Eval, Low Complexity Minutes (68388) 6   Physical Therapy Goals   PT Frequency 4x/week   PT Predicted Duration/Target Date for Goal Attainment 07/01/24   PT Goals Bed Mobility;Transfers;Gait   PT: Bed Mobility Independent;Supine to/from sit   PT: Transfers Modified independent;Sit to/from stand;Assistive device    PT: Gait Modified independent;Assistive device;Standard walker;50 feet;Goal Met   PT Discharge Planning   PT Plan IND with bed mobility and transfers; gait with FWW   PT Discharge Recommendation (DC Rec) home with assist   PT Rationale for DC Rec Pt appears to be at/nearing baseline from a mobility standpoint. Anticipate will be safe to return home with continued assist from daughter/PCA when medically ready.   PT Brief overview of current status SBA with FWW   Total Session Time   Total Session Time (sum of timed and untimed services) 6

## 2024-06-17 NOTE — PLAN OF CARE
Patient is alert and forgetful, NPO except med,s, BG at 0100 52, patient received PRN Oral gel, re-check BG 48, patient received IM glucagon, re-check BG was 106, this morning , patient has  a sitter due to intermittent confusions, VS stable continue to monitor.

## 2024-06-17 NOTE — CONSULTS
Discharge Pharmacy Test Claim    Patient's [employer] Part D plan covers glucagon emergency kit and Gvoke Hypopen with an expected monthly copay of $0.00.    Test Claim Copay   Glucagon emergency kit  0.00   Gvoke Hypopen 0.00       Mónica Lr  [facility] Liaison  Phone: [phone] Fax: [phone]  Available on Teams & Vocera

## 2024-06-17 NOTE — UTILIZATION REVIEW
Admission Status; Secondary Review Determination     Admission Date: 6/16/2024  8:13 AM       Under the authority of the Utilization Management Committee, the utilization review process indicated a secondary review on the above patient.  The review outcome is based on review of the medical records, discussions with staff, and applying clinical experience noted on the date of the review.        (x)      Inpatient Status Appropriate - This patient's medical care is consistent with medical management for inpatient care and reasonable inpatient medical practice.       RATIONALE FOR DETERMINATION      Brief clinical presentation, information copied from the chart, abbreviated and edited for relevant content:     A patient with a history of multiple strokes (right basal ganglia ischemic infarct in 2018, left basal ganglia hemorrhagic stroke in 2021, right centrum semiovale and right chuck infarct in 2023) and existing residual deficits, presented with sudden worsening of left facial droop, dysarthria, dysphagia, and transient right-sided hemiparesis. MRI/MRA stroke protocol revealed an acute/subacute infarct in the right insular and right frontal operculum regions due to intracranial atherosclerotic disease (ICAD) with high-grade stenosis of the MCA M2 segment. Given her vascular risk factors, including hypertension, hyperlipidemia, diabetes (A1c 10.2%), and a new UTI, the etiology could be either new cerebrovascular events or recrudescence of prior strokes. Thrombolytic therapy was not administered due to being outside the treatment window, and thrombectomy was not performed due to the absence of severe worsening neurological deficits. She was loaded with aspirin and clopidogrel and will continue on dual antiplatelet therapy.      Inpatient admission is essential for this patient due to the acute presentation of significant neurological deficits and the high risk of further cerebrovascular events. Continuous neurological  monitoring and management of her multiple comorbidities, including hypertension, diabetes, and recurrent strokes, are critical to prevent further deterioration. The complexity of her condition, including the need for dual antiplatelet therapy initiation, permissive hypertension management, and close observation for potential complications, necessitates an inpatient setting. Treating her as an outpatient would pose a significant risk of adverse outcomes, including recurrent strokes, given her unstable and high-risk profile.      At the time of admission with the information available to the attending physician, more than 2 nights hospital complex care was anticipated. . Inpatient admission appropriate based on Medicare guidelines.       The information on this document is developed by the utilization review team in order for the business office to ensure compliance.  This only denotes the appropriateness of proper admission status and does not reflect the quality of care rendered.         The definitions of Inpatient Status and Observation Status used in making the determination above are those provided in the CMS Coverage Manual, Chapter 1 and Chapter 6, section 70.4.      Sincerely,      Mini Vences MD   Utilization Review/ Case Management  [facility].

## 2024-06-17 NOTE — PLAN OF CARE
Goal Outcome Evaluation:      Plan of Care Reviewed With: patient, child, caregiver    Overall Patient Progress: no changeOverall Patient Progress: no change

## 2024-06-17 NOTE — PROGRESS NOTES
[facility]    Stroke Progress Note    Interval Events  No acute events overnight. Patient was lying in bed comfortably, denied any pain, changes in vision, numbness tingling sensations or new focal weakness.     Today's exchanges:  -PT/OT/SLP evaluation  -Continue partial permissive hypertension with SBP <180mm Hg  -Restart carvedilol from 6.25 BID (PTA dose 12.5 BID)  -Hold PTA amlodipine   -Endocrinology consult for BG management    HPI Summary  Isabell Matthews is a 63 year old female with diabetes mellitus, diabetic polyneuropathy, 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 Per patient's daughter, her last known well was 10:30 PM last night before going to bed. This morning around 7:30 AM daughter noticed that patient is having significant left facial droop with drooling, slurred speech, and some difficulty moving the right side of her body while attempting to use her walker. Her right-sided weakness gradually resolved mostly on her way to the ED. At the ED she continued to have slurred speech, and left facial droop. Patient denies headaches, numbness tingling sensation, changes in vision or hearing, chest pain or dyspnea or abdominal pain, fever, chills, nausea, dysuria.       At baseline, patient has some mild left facial droop and dysarthria, dysphagia due to prior strokes. She walks with a walker due to gait imbalances. 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    Stroke Evaluation Summarized    MRI/Head CT 1. Acute infarct in the right  insular cortex and right frontal operculum.  2. Multifocal chronic infarcts, greatest in the left external capsule.   Intracranial and Cervical Vasculature 1. Short segment severe stenosis of the right superior division M2 segment with similar appearance on the comparison CTA examination.  2. Multifocal areas of additional high-grade stenosis including involvement of the proximal A2 segment of the left anterior cerebral artery, the bilateral posterior cerebral arteries, and multiple distal bilateral MCA branches. These areas of stenosis are similar to the  prior CT angiogram.     Echocardiogram Left ventricular size, wall motion and function are normal. The ejection fraction is 60-65%. Right ventricular function, chamber size, wall motion, and thickness are normal. Mild mitral insufficiency is present. The inferior vena cava was normal in size with preserved respiratory variability. Small circumferential pericardial effusion is present without any hemodynamic significance.   EKG/Telemetry Sinus rhythm    Other Testing Not Applicable      LDL  6/16/2024: 67 mg/dL   A1C  5/20/2024: 10.2 %   Troponin 6/16/2024: 32 ng/L       Impression   # History of prior strokes (right basal ganglia ischemic infarct in 2018, left basal ganglia hemorrhagic stroke 2021, right centrum semiovale & right chuck infarct 2023)   # Baseline facial droop and dysarthria, dysphagia  # Sudden onset worsening left facial droop and dysarthria, dysphagia  # Sudden onset right side hemiparesis, mostly resolved  # Acute/subacute right insular and right frontal operculum infarct 2/2 ICAD  # High-grade stenosis of MCA M2 segment and A2 segment  Patient with multiple vascular risk factors including hypertension, hyperlipidemia, diabetes, and multiple episodes of ischemic and hemorrhagic strokes since 2018 with residual facial droop and dysphagia, history of seizures, presented with sudden onset left facial droop and dysarthria, dysphagia significantly  worse than baseline facial droop and dysarthria per family. Considering patient's multiple risk factors, and worsening neurological deficits, cannot exclude that this episode is due to new cerebrovascular attacks. On the other hand, it also could be recrudescence of prior strokes due to stressors such as UTI or DKA. Patient's urinalysis is indicated of of UTI. Recent A1c 10.2%, LDL 67. Later MRI/MRA stroke protocol showed: acute/subacute right insular and right frontal operculum infarct. Occlusion of MCA M2 segment. Etiology is considered to be ICAD. Thrombolytic was not given due to outside of treatment window, and thrombectomy was given due to no severe worsening neurological deficits.   - MRI Stroke Protocol- result as above  - MRA Stroke Protocol - result as above  - Admit to Neurology  - Neurochecks Q 4 hours  - Permissive HTN; labetalol PRN for SBP > 220  - Avoid hypotonic IV fluids  - Load aspirin 300 mg on 6/16 for secondary stroke prevention (Clopidogrel was not given due to NPO)  - Load clopidogrel 300 mg and give Asprin 81 mg on 6/17, then plan to continue dual antiplatelet therapy (ASA 81+ Plavix 75) for a total of 90 days then continue with aspirin monotherapy  - LDL 67, continue atorvastatin 40 mg  - TTE with Bubble Study  - Telemetry, EKG  - Bedside Glucose Monitoring  - PT/OT/SLP  - Stroke Education  - Depression Screen  - Apnea Screen  - Euthermia, Euglycemia  - Nutrition: SLP recommended regular diet with thin liquid     # Hx of seizures  - BID Levetiracetam 500mg BID    # Hypertension  # Hyperlipidemia  - Permissive HTN; labetalol PRN for SBP > 220 on 6/16, then PRN for SBP >180 on 6/17  - LDL 67, continue atorvastatin from 40 mg  - Restart carvedilol from 6.25 BID (PTA dose 12.5 BID)  - Hold PTA Amlodipine 5mg     #Diabetes mellitus  - Recent A1c 10.2%  - Currently NPO till speech evaluation  - On medium sliding scale insulin   - Lantus pen 5 units (home dose 20 units)  - Endocrinology consult  for BG management     # Diabetic neuropathy  -Hold PTA gabapentin 100 mg at bedtime  -Hold duloxetine 20 mg daily     # UTI, recurrent  -ceftriaxone 1g x 5 days     Prophylaxis            For VTE Prevention:  - pneumatic compression device  - subcutaneous heparin     For Acid Suppression:  - No indications     Code Status  DNI/DNR     The patient was discussed with the Stroke Staff is Dr. Diaz.     Jess Nair MD  Neurology Resident, PGY-1  ____________________________________________________    Clinically Significant Risk Factors Present on Admission                # Drug Induced Platelet Defect: home medication list includes an antiplatelet medication   # Hypertension: Noted on problem list   # Dementia: noted on problem list               # Financial/Environmental Concerns:        # CKD, Stage 3b (GFR 30-44): Will monitor and treat as appropriate  - last Cr =  1.38 mg/dL (Ref range: 0.51 - 0.95 mg/dL)  - last GFR = 42 mL/min/1.73m2 (Ref range: >60 mL/min/1.73m2)       Medications   Scheduled Meds  Current Facility-Administered Medications   Medication Dose Route Frequency Provider Last Rate Last Admin    [Held by provider] amLODIPine (NORVASC) tablet 5 mg  5 mg Oral or Feeding Tube Daily Shanelle Pimentel, [facility]        aspirin EC tablet 81 mg  81 mg Oral Daily eJss Nair MD   81 mg at 06/17/24 1202    atorvastatin (LIPITOR) tablet 40 mg  40 mg Oral or Feeding Tube Daily Jess Nair MD   40 mg at 06/17/24 1204    carvedilol (COREG) tablet 6.25 mg  6.25 mg Oral or Feeding Tube BID w/meals Jess Nair MD        [START ON 6/18/2024] clopidogrel (PLAVIX) tablet 75 mg  75 mg Oral Daily Jess Nair MD        DULoxetine (CYMBALTA) DR capsule 20 mg  20 mg Oral or Feeding Tube Daily Jess Nair MD   20 mg at 06/17/24 1204    gabapentin (NEURONTIN) capsule 100 mg  100 mg Oral or Feeding Tube At Bedtime Jess Nair MD        heparin ANTICOAGULANT injection 5,000 Units  5,000 Units Subcutaneous Q8H WAN Damico  MD uLiz   5,000 Units at 06/17/24 0556    insulin aspart (NovoLOG) injection (RAPID ACTING)  1-3 Units Subcutaneous 4x Daily Karrie Flood PA-C        insulin aspart (NovoLOG) injection (RAPID ACTING)   Subcutaneous TID AC Karrie Flood PA-C        insulin glargine (LANTUS PEN) injection 5 Units  5 Units Subcutaneous At Bedtime Luiz Damico MD        levETIRAcetam (KEPPRA) intermittent infusion 500 mg  500 mg Intravenous BID Luiz Damico MD 0 mL/hr at 06/16/24 2156 500 mg at 06/17/24 0750    [Held by provider] levothyroxine (SYNTHROID/LEVOTHROID) tablet 75 mcg  75 mcg Oral or Feeding Tube Daily Luiz Damico MD        sodium chloride (PF) 0.9% PF flush 3 mL  3 mL Intracatheter Q8H Luiz Damico MD   3 mL at 06/17/24 0355       Infusion Meds  Current Facility-Administered Medications   Medication Dose Route Frequency Provider Last Rate Last Admin    medication instruction - No oral meds if patient didn't pass dysphagia screen   Does not apply Continuous PRN Luiz Damico MD        Medication Instructions - Avoid dextrose in IV solutions.   Intravenous Continuous PRN Luiz Damico MD           PRN Meds  Current Facility-Administered Medications   Medication Dose Route Frequency Provider Last Rate Last Admin    glucose gel 15-30 g  15-30 g Oral Q15 Min PRN Luiz Damico MD   15 g at 06/17/24 0117    Or    dextrose 50 % injection 25-50 mL  25-50 mL Intravenous Q15 Min PRN Luiz Damico MD        Or    glucagon injection 1 mg  1 mg Subcutaneous Q15 Min PRN Luiz Damico MD   1 mg at 06/17/24 0157    labetalol (NORMODYNE/TRANDATE) injection 10-20 mg  10-20 mg Intravenous Q10 Min PRN Jess Nair MD        Or    hydrALAZINE (APRESOLINE) injection 10-20 mg  10-20 mg Intravenous Q1H PRN Jess Nair MD        lidocaine (LMX4) cream   Topical Q1H PRN Luiz Damico MD        lidocaine 1 % 0.1-1 mL  0.1-1 mL Other Q1H PRN Luiz Damico MD        medication instruction - No oral meds if patient didn't pass  dysphagia screen   Does not apply Continuous PRN Luiz Damico MD        Medication Instructions - Avoid dextrose in IV solutions.   Intravenous Continuous PRN Luiz Damico MD        sodium chloride (PF) 0.9% PF flush 3 mL  3 mL Intracatheter q1 min prn Luiz Damico MD              PHYSICAL EXAMINATION  Temp:  [97.5  F (36.4  C)-98.8  F (37.1  C)] 98.3  F (36.8  C)  Pulse:  [74-94] 90  Resp:  [16-18] 16  BP: (142-191)/() 154/87  SpO2:  [96 %-100 %] 100 %      General:  patient lying in bed without any acute distress    HEENT:  normocephalic/atraumatic  Cardio:  RRR  Pulmonary:  no respiratory distress  Abdomen:  soft, non-tender  Extremities:  no edema  Skin:  intact      Neurologic  Mental Status:  alert, oriented only to self, follows simple commands, speech slow, naming and repetition normal  Cranial Nerves:  visual fields intact, PERRL, EOMI with normal smooth pursuit, facial sensation intact and symmetric, left facial droop with left flattened nasolabial fold, hearing not formally tested but intact to conversation, mild to moderate dysarthria, shoulder shrug strong bilaterally, tongue protrusion midline  Motor:  normal muscle tone and bulk, no abnormal movements, able to move all limbs spontaneously, strength 5/5 throughout upper and lower extremities, except for 5-/5 left finger extensors, no pronator drift  Reflexes:  toes down-going  Sensory:  light touch sensation intact and symmetric throughout upper and lower extremities, no extinction on double simultaneous stimulation   Coordination:  normal finger-to-nose and heel-to-shin bilaterally without dysmetria  Station/Gait:  deferred    Stroke Scales    NIHSS  1a. Level of Consciousness 0-->Alert, keenly responsive   1b. LOC Questions 1-->Answers one question correctly   1c. LOC Commands 0-->Performs both tasks correctly   2.   Best Gaze 0-->Normal   3.   Visual 0-->No visual loss   4.   Facial Palsy 1-->Minor paralysis (flattened nasolabial fold,  "asymmetry on smiling)   5a. Motor Arm, Left 0-->No drift, limb holds 90 (or 45) degrees for full 10 secs   5b. Motor Arm, Right 0-->No drift, limb holds 90 (or 45) degrees for full 10 secs   6a. Motor Leg, Left 0-->No drift, leg holds 30 degree position for full 5 secs   6b. Motor Leg, right 0-->No drift, leg holds 30 degree position for full 5 secs   7.   Limb Ataxia 0-->Absent   8.   Sensory 0-->Normal, no sensory loss   9.   Best Language 1-->Mild-to-moderate aphasia, some obvious loss of fluency or facility of comprehension, without significant limitation on ideas expressed or form of expression. Reduction of speech and/or comprehension, however, makes conversation. . . (see row details)   10. Dysarthria 0-->Normal   11. Extinction and Inattention  0-->No abnormality   Total 3 (06/16/24 1754)       Modified Timothy Score (Pre-morbid)  4    Imaging  I personally reviewed all imaging; relevant findings per HPI.     Lab Results Data   CBC  Recent Labs   Lab 06/17/24  0529 06/16/24  0824   WBC 7.8 4.5   RBC 4.04 3.86   HGB 11.4* 10.9*   HCT 35.6 34.4*    180     Basic Metabolic Panel    Recent Labs   Lab 06/17/24  1152 06/17/24  0735 06/17/24  0601 06/17/24  0529 06/16/24  1209 06/16/24  0846 06/16/24  0824   NA  --   --   --  140  --   --  139   POTASSIUM  --   --   --  3.8  --   --  4.2   CHLORIDE  --   --   --  107  --   --  107   CO2  --   --   --  23  --   --  23   BUN  --   --   --  25.5*  --   --  34.4*   CR  --   --   --  1.38*  --  1.9* 1.73*   * 170* 185* 210*   < >  --  368*   VERONICA  --   --   --  8.6*  --   --  8.8    < > = values in this interval not displayed.     Liver Panel  No results for input(s): \"PROTTOTAL\", \"ALBUMIN\", \"BILITOTAL\", \"ALKPHOS\", \"AST\", \"ALT\", \"BILIDIRECT\" in the last 168 hours.  INR    Recent Labs   Lab Test 06/16/24  0824 03/07/24  0111 03/07/24  0109   INR 0.98 1.1* 0.99      Lipid Profile    Recent Labs   Lab Test 06/16/24  0824 05/20/23  1312 03/29/23  1158   CHOL 134 " 136 141   HDL 43* 35* 48*   LDL 67 71 72   TRIG 119 152* 105     A1C    Recent Labs   Lab Test 05/20/24  1538 01/29/24  1655 09/04/23  0228   A1C 10.2* 10.7* 9.3*     Troponin    Recent Labs   Lab 06/16/24 2038 06/16/24  1236 06/16/24  0824   CTROPT 32* 31* 33*          Data

## 2024-06-17 NOTE — PROGRESS NOTES
Shift: 0700-    V/S & Pain: VSS on RA. Denies pain  Neuro: Alert and intermittently confused, calm and cooperative  Respiratory: Stable on RA, lung sounds clear bilaterally  Cardiac: normal sinus  Skin: No new skin concerns  GI/: Voiding via purewick with good output, no BM this morning  Nutrition: NPO except meds, morning , denies N/V  L/D: L PIV running running kepra infusion  Activity: assist x1, uses walker due to gait imbalances    Events this shift: no acute events this shift. Pt remains vitally stable on RA. Call light within reach, sitter at bedside due to confusion and minimizing fall risk    Report given to 6A RN

## 2024-06-17 NOTE — PHARMACY-CONSULT NOTE
Pharmacy Consult to evaluate for medication related stroke core measures    Isabell Matthews, 63 year old female admitted for stroke like symptoms on 6/16/2024.    Thrombolytic was not given because of Time from onset contraindications    VTE Prophylaxis Heparin given on 6/16 as appropriate prior to end of hospital day 2.    Antithrombotic: aspirin started on 6/16, as appropriate by end of hospital day 2. Continue antithrombotic therapy on discharge to meet quality measures, unless contraindicated.    Anticoagulation if history of A-fib/flutter: Patient does not have history of A-fib/flutter - anticoagulation not required for medication related stroke core measures.     LDL Cholesterol Calculated   Date Value Ref Range Status   06/16/2024 67 <=100 mg/dL Final       Patient currently receiving Lipitor (atorvastatin) continue statin on discharge to meet quality measures, unless contraindicated.    Recommendations: None at this time    Thank you for the consult.    Simone Quinonez RPH 6/17/2024 3:27 PM

## 2024-06-17 NOTE — PROGRESS NOTES
06/17/24 1000   Appointment Info   Signing Clinician's Name / Credentials (OT) YULISA Blair   Student Supervision On-site supervision provided   Living Environment   People in Home child(tiffany), adult   Current Living Arrangements apartment   Home Accessibility no concerns   Transportation Anticipated family or friend will provide   Living Environment Comments Pt limited historian, per SW note 9/23: Patient lives in a senior living building ([facility] community) in an apartment with her daughter Mel. Mel is one of the patients PCA's, the patient also has another PCA that comes into the home 30 hrs a week.   Self-Care   Usual Activity Tolerance moderate   Current Activity Tolerance poor   Equipment Currently Used at Home walker, rolling   Fall history within last six months no   Activity/Exercise/Self-Care Comment Per PT: Ambulates in their apartment using FWW. Manual wc in community. Daughter/PCA assists with I/ADLs.   Instrumental Activities of Daily Living (IADL)   IADL Comments Pt reports her daughters and PCA assist with meal preparation, IADLs   General Information   Onset of Illness/Injury or Date of Surgery 06/16/24   Referring Physician Elodia Wells MD   Additional Occupational Profile Info/Pertinent History of Current Problem Isabell Matthews is a 63 year old female with diabetes mellitus, diabetic polyneuropathy, 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 Per patient's daughter, her last known well was 10:30 PM last night before going to bed. This morning around 7:30 AM daughter noticed that patient is having significant left facial droop  with drooling, slurred speech, and some difficulty moving the right side of her body while attempting to use her walker. Her right-sided weakness gradually resolved mostly on her way to the ED. At the ED she continued to have slurred speech, and left facial droop. Patient denies headaches, numbness tingling sensation, changes in vision or hearing, chest pain or dyspnea or abdominal pain, fever, chills, nausea, dysuria. At baseline, patient has some mild left facial droop and dysarthria, dysphagia due to prior strokes. She walks with a walker due to gait imbalances. 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   Left Upper Extremity (Weight-bearing Status) full weight-bearing (FWB)   Right Upper Extremity (Weight-bearing Status) full weight-bearing (FWB)   Left Lower Extremity (Weight-bearing Status) full weight-bearing (FWB)   Right Lower Extremity (Weight-bearing Status) full weight-bearing (FWB)   Cognitive Status Examination   Orientation Status person  (Pt reported month as April 2024. Pt knew she was in the hospital but unable to recall which one or why she is in the hospital. Pt able to accurately recall birth date)   Affect/Mental Status (Cognitive) confused;low arousal/lethargic   Follows Commands delayed response/completion;increased processing time needed;repetition of directions required;verbal cues/prompting required;follows two-step commands   Cognitive Status Comments Cognition not formally assessed this date, but notable challenges with processing, working memory, and expressive aphasia. Will continue to monitor and formally assess next session   Visual Perception   Visual Impairment/Limitations WFL   Sensory   Sensory Quick Adds sensation intact   Pain Assessment   Patient Currently in Pain No   Posture   Posture forward head position;protracted shoulders   Range of Motion Comprehensive   General Range of Motion bilateral upper extremity ROM WFL    Strength Comprehensive (MMT)   General Manual Muscle Testing (MMT) Assessment upper extremity strength deficits identified;hand strength deficits identified   Upper Extremity (Manual Muscle Testing)   Upper Extremity: Manual Muscle Testing (MMT) right UE strength is WFL;left UE strength is WFL   Comment, MMT: Upper Extremity LUE 4/5   Coordination   Upper Extremity Coordination Left UE impaired   Clinical Impression   Criteria for Skilled Therapeutic Interventions Met (OT) Yes, treatment indicated   OT Diagnosis Reduced ADL function   OT Problem List-Impairments impacting ADL problems related to;cognition;strength;range of motion (ROM);coordination   Assessment of Occupational Performance 3-5 Performance Deficits   Planned Therapy Interventions (OT) ADL retraining;IADL retraining;cognition;strengthening   Clinical Decision Making Complexity (OT) problem focused assessment/low complexity   Risk & Benefits of therapy have been explained evaluation/treatment results reviewed;care plan/treatment goals reviewed;risks/benefits reviewed;current/potential barriers reviewed;participants voiced agreement with care plan;participants included;patient   OT Total Evaluation Time   OT Eval, Low Complexity Minutes (36054) 5   OT Goals   Therapy Frequency (OT) 5 times/week   OT Predicted Duration/Target Date for Goal Attainment 06/24/24   OT Goals Hygiene/Grooming;Upper Body Dressing;Lower Body Dressing;Bed Mobility;Transfers;Cognition   OT: Hygiene/Grooming modified independent   OT: Upper Body Dressing Modified independent   OT: Lower Body Dressing Modified independent   OT: Bed Mobility Independent   OT: Transfer Independent   OT: Cognitive Patient/caregiver will verbalize understanding of cognitive assessment results/recommendations as needed for safe discharge planning   Therapeutic Procedures/Exercise   Therapeutic Procedure: strength, endurance, ROM, flexibillity minutes (98541) 15   Treatment Detail/Skilled Intervention  "Following evaluation, treatment indicated and initiated. Pt supine in bed at arrival, agreeable to therapy. VSS on RA. Pt declining OOB activity d/t feeling fatigued from previous PT session. Pt agreeable to in-bed ROM and strengthening exercises.Pt A&Ox1, unable to recall which hospital she's at, why she's in the hospital, or what month it is. Time spent building therapeutic rapport; pt confused, nervous, but engaged throughout. Pt requiring VC to stay on track in conversation, mildly tangential. Pt having difficulty naming objects in room, reporting \"I know what it is, I just can't say it\". Sitting up in bed, pt completed UE strengthening and ROM exercises; shoulder shrugs, bicep curls, wrist pronation/supination, forward arm raises, hand coordination/circumduction. Pt unable to recall exercises previously engaged in, formal programming with handout to be provided next session. Pt supine in bed, all needs met, bed alarm set and call light nearby at end of session.   OT Discharge Planning   OT Plan SLUMs, medication management, OOB activity, caregiver education   OT Discharge Recommendation (DC Rec) home with assist   OT Rationale for DC Rec Pt is currently near functional baseline, limited by new L sided weakness, cognition, and deconditioning. Pt has good support at her accessible senior living faciliaty from daughters and PCA. Anticipate pt progressing well with therapies and being safe to d/c home with assist when medically ready.   OT Brief overview of current status Per PT, SBA with FWW   Total Session Time   Timed Code Treatment Minutes 15   Total Session Time (sum of timed and untimed services) 20   Psychosocial Support   Trust Relationship/Rapport care explained;choices provided;emotional support provided;empathic listening provided;questions answered     "

## 2024-06-18 ENCOUNTER — ORDERS ONLY (AUTO-RELEASED) (OUTPATIENT)
Dept: NURSING | Facility: CLINIC | Age: 64
End: 2024-06-18

## 2024-06-18 ENCOUNTER — APPOINTMENT (OUTPATIENT)
Dept: SPEECH THERAPY | Facility: CLINIC | Age: 64
DRG: 065 | End: 2024-06-18
Payer: COMMERCIAL

## 2024-06-18 ENCOUNTER — APPOINTMENT (OUTPATIENT)
Dept: OCCUPATIONAL THERAPY | Facility: CLINIC | Age: 64
DRG: 065 | End: 2024-06-18
Payer: COMMERCIAL

## 2024-06-18 VITALS
SYSTOLIC BLOOD PRESSURE: 151 MMHG | HEART RATE: 86 BPM | DIASTOLIC BLOOD PRESSURE: 83 MMHG | OXYGEN SATURATION: 99 % | RESPIRATION RATE: 16 BRPM | TEMPERATURE: 98 F

## 2024-06-18 DIAGNOSIS — I63.9 CEREBROVASCULAR ACCIDENT (CVA), UNSPECIFIED MECHANISM (H): ICD-10-CM

## 2024-06-18 LAB
BACTERIA UR CULT: ABNORMAL
BACTERIA UR CULT: ABNORMAL
C PEPTIDE SERPL-MCNC: <0.1 NG/ML (ref 0.9–6.9)
GLUCOSE BLDC GLUCOMTR-MCNC: 181 MG/DL (ref 70–99)
GLUCOSE BLDC GLUCOMTR-MCNC: 209 MG/DL (ref 70–99)
GLUCOSE BLDC GLUCOMTR-MCNC: 248 MG/DL (ref 70–99)

## 2024-06-18 PROCEDURE — 99239 HOSP IP/OBS DSCHRG MGMT >30: CPT | Mod: GC | Performed by: STUDENT IN AN ORGANIZED HEALTH CARE EDUCATION/TRAINING PROGRAM

## 2024-06-18 PROCEDURE — 92526 ORAL FUNCTION THERAPY: CPT | Mod: GN

## 2024-06-18 PROCEDURE — 96125 COGNITIVE TEST BY HC PRO: CPT | Mod: GO

## 2024-06-18 PROCEDURE — 250N000011 HC RX IP 250 OP 636

## 2024-06-18 PROCEDURE — 250N000013 HC RX MED GY IP 250 OP 250 PS 637

## 2024-06-18 PROCEDURE — 99232 SBSQ HOSP IP/OBS MODERATE 35: CPT | Performed by: STUDENT IN AN ORGANIZED HEALTH CARE EDUCATION/TRAINING PROGRAM

## 2024-06-18 RX ORDER — INSULIN ASPART 100 [IU]/ML
2-4 INJECTION, SOLUTION INTRAVENOUS; SUBCUTANEOUS
Qty: 15 ML | Refills: 11 | Status: SHIPPED | OUTPATIENT
Start: 2024-06-18 | End: 2024-06-18

## 2024-06-18 RX ORDER — SULFAMETHOXAZOLE AND TRIMETHOPRIM 400; 80 MG/1; MG/1
1 TABLET ORAL 2 TIMES DAILY
Qty: 6 TABLET | Refills: 0 | Status: SHIPPED | OUTPATIENT
Start: 2024-06-18 | End: 2024-06-21

## 2024-06-18 RX ORDER — LEVETIRACETAM 500 MG/1
500 TABLET ORAL 2 TIMES DAILY
Status: DISCONTINUED | OUTPATIENT
Start: 2024-06-18 | End: 2024-06-18 | Stop reason: HOSPADM

## 2024-06-18 RX ORDER — INSULIN ASPART 100 [IU]/ML
2-4 INJECTION, SOLUTION INTRAVENOUS; SUBCUTANEOUS
Qty: 15 ML | Refills: 11 | Status: SHIPPED | OUTPATIENT
Start: 2024-06-18

## 2024-06-18 RX ORDER — INSULIN ASPART 100 [IU]/ML
INJECTION, SOLUTION INTRAVENOUS; SUBCUTANEOUS
COMMUNITY
Start: 2024-06-18 | End: 2024-07-17

## 2024-06-18 RX ORDER — SULFAMETHOXAZOLE AND TRIMETHOPRIM 400; 80 MG/1; MG/1
1 TABLET ORAL 2 TIMES DAILY
Status: DISCONTINUED | OUTPATIENT
Start: 2024-06-18 | End: 2024-06-18 | Stop reason: HOSPADM

## 2024-06-18 RX ORDER — CLOPIDOGREL BISULFATE 75 MG/1
75 TABLET ORAL DAILY
Qty: 89 TABLET | Refills: 0 | Status: SHIPPED | OUTPATIENT
Start: 2024-06-19 | End: 2024-09-25

## 2024-06-18 RX ORDER — CARVEDILOL 12.5 MG/1
12.5 TABLET ORAL 2 TIMES DAILY WITH MEALS
Status: DISCONTINUED | OUTPATIENT
Start: 2024-06-18 | End: 2024-06-18 | Stop reason: HOSPADM

## 2024-06-18 RX ADMIN — LEVETIRACETAM 500 MG: 500 TABLET, FILM COATED ORAL at 08:11

## 2024-06-18 RX ADMIN — INSULIN ASPART 1 UNITS: 100 INJECTION, SOLUTION INTRAVENOUS; SUBCUTANEOUS at 08:14

## 2024-06-18 RX ADMIN — SULFAMETHOXAZOLE AND TRIMETHOPRIM 1 TABLET: 400; 80 TABLET ORAL at 12:52

## 2024-06-18 RX ADMIN — ASPIRIN 81 MG: 81 TABLET ORAL at 07:57

## 2024-06-18 RX ADMIN — ATORVASTATIN CALCIUM 40 MG: 40 TABLET, FILM COATED ORAL at 07:56

## 2024-06-18 RX ADMIN — INSULIN ASPART 1 UNITS: 100 INJECTION, SOLUTION INTRAVENOUS; SUBCUTANEOUS at 12:47

## 2024-06-18 RX ADMIN — CARVEDILOL 6.25 MG: 6.25 TABLET, FILM COATED ORAL at 07:56

## 2024-06-18 RX ADMIN — CLOPIDOGREL BISULFATE 75 MG: 75 TABLET ORAL at 07:56

## 2024-06-18 RX ADMIN — HEPARIN SODIUM 5000 UNITS: 5000 INJECTION, SOLUTION INTRAVENOUS; SUBCUTANEOUS at 05:11

## 2024-06-18 RX ADMIN — DULOXETINE HYDROCHLORIDE 20 MG: 20 CAPSULE, DELAYED RELEASE ORAL at 07:56

## 2024-06-18 RX ADMIN — AMLODIPINE BESYLATE 5 MG: 5 TABLET ORAL at 11:31

## 2024-06-18 ASSESSMENT — ACTIVITIES OF DAILY LIVING (ADL)
ADLS_ACUITY_SCORE: 48
ADLS_ACUITY_SCORE: 44
ADLS_ACUITY_SCORE: 48

## 2024-06-18 ASSESSMENT — PATIENT HEALTH QUESTIONNAIRE - PHQ9: SUM OF ALL RESPONSES TO PHQ QUESTIONS 1-9: 2

## 2024-06-18 NOTE — DISCHARGE SUMMARY
[facility]    Neurology Stroke Discharge Summary    Date of Admission: 6/16/2024  Date of Discharge: 06/18/2024    Disposition: Discharged to home  Primary Care Physician: Bony Castaneda      Admission Diagnosis:   # History of prior strokes (right basal ganglia ischemic infarct in 2018, left basal ganglia hemorrhagic stroke 2021, right centrum semiovale & right chuck infarct 2023)   # Baseline facial droop and dysarthria, dysphagia  # High-grade stenosis of MCA M2 segment and A2 segment  # Hx of seizures   # Hypertension  # Hyperlipidemia  # Diabetes mellitus   # UTI, recurrent     Discharge Diagnosis:   # History of prior strokes (right basal ganglia ischemic infarct in 2018, left basal ganglia hemorrhagic stroke 2021, right centrum semiovale & right chuck infarct 2023)   # Baseline facial droop and dysarthria, dysphagia  # Sudden onset worsening left facial droop and dysarthria, dysphagia  # Sudden onset right side hemiparesis, mostly resolved  # Acute/subacute right insular and right frontal operculum infarct 2/2 ICAD  # High-grade stenoses of MCA M2 segment and A2 segment  # Hx of seizures   # Hypertension  # Hyperlipidemia  # Diabetes mellitus   # UTI, recurrent     Problem Leading to Hospitalization (from [facility]):   Isabell Matthews is a 63 year old female with diabetes mellitus, diabetic polyneuropathy, 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 Per patient's daughter, her last known well was 10:30 PM last night before going to bed. This morning around 7:30 AM daughter noticed that patient is having  significant left facial droop with drooling, slurred speech, and some difficulty moving the right side of her body while attempting to use her walker. Her right-sided weakness gradually resolved mostly on her way to the ED. At the ED she continued to have slurred speech, and left facial droop. Patient denies headaches, numbness tingling sensation, changes in vision or hearing, chest pain or dyspnea or abdominal pain, fever, chills, nausea, dysuria.       At baseline, patient has some mild left facial droop and dysarthria, dysphagia due to prior strokes. She walks with a walker due to gait imbalances. 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    Please see H&P dated 6/16/2024 for further details about presentation.    Brief Hospital Course:   Patient presented with sudden onset worsening left facial droop and dysarthria, dysphagia.      Found to have acute/subacute right insular and right frontal operculum infarct .      IV thrombolysis was not given.      Work-up as stated below under Pertinent Investigations.    Etiology is thought to be intracranial atherosclerotic disease.      Rehab evaluation: OT, PT, and SLP.     Smoking Cessation: education provided, already quit smoking    BP Long-term Goal: 140/90 or less    Antithrombotic/Anticoagulant Agent: aspirin 81 mg and clopidogrel (Plavix) 75 mg daily for 90 days followed by aspirin 81 mg daily indefinitely    Statins: Continue atorvastatin 40 mg daily.    Hgb A1C Goal: < 7.0    Complications: None.     Other problems addressed during this hospitalization:  # Hx of seizures  Continue BID Levetiracetam 500mg BID.     # Hypertension  # Hyperlipidemia  Permissive HTN: labetalol PRN for SBP > 220 mmHg on 6/16, then PRN for SBP >180 mmHg on 6/17, goal of normotensive <130/80 mmHg thereafter. LDL 67, continue atorvastatin from 40 mg. Restart carvedilol PTA dose 12.5 BID and PTA Amlodipine 5mg.     #Diabetes  mellitus  Recent A1c 10.2%, endocrinology consult for BG management. Her BG was managed with sliding scale insulin and Lantus 13 units (PTA dose 20 units).  Endocrinology recommended home regimen: Lantus 13 units daily, Humalog 2-4 units with meals plus correction scale 1 unit per 100 >200 TID AC and HS. We also prescribed Gvoke (glucagon) for episodes of hypoglycemia.      # Diabetic neuropathy  Continue PTA gabapentin 100 mg at bedtime and duloxetine 20 mg daily     # UTI, recurrent  Ceftriaxone 1g x 2 days. Urine culture pansensitive, hence we prescribed Bactrim 400-80 MG twice daily  x3 more days after discharge to finish the full course.      PERTINENT INVESTIGATIONS    Labs  Lipid Panel:   Recent Labs   Lab Test 06/16/24  0824   CHOL 134   HDL 43*   LDL 67   TRIG 119     A1C:   Lab Results   Component Value Date    A1C 10.2 05/20/2024    A1C 7.8 06/21/2021     INR:   Recent Labs   Lab 06/16/24  0824   INR 0.98      Coag Panel / Hypercoag Workup: Not indicated  Pending test results: None    Echo: Left ventricular size, wall motion and function are normal. The ejection fraction is 60-65%. Right ventricular function, chamber size, wall motion, and thickness are normal.  The atrial septum is intact as assessed by color Doppler and agitated saline bubble study. Mild mitral insufficiency is present. The inferior vena cava was normal in size with preserved respiratory variability. Small circumferential pericardial effusion is present without any hemodynamic significance.    MRA Brain ([place] and Carotid) wo Contrast 6/16/24: Short segment severe stenosis of the right superior division M2 segment with similar appearance on the comparison CTA examination. Multifocal areas of additional high-grade stenosis includinginvolvement of the proximal A2 segment of the left anterior cerebral artery, the bilateral posterior cerebral arteries, and multiple distal bilateral MCA branches. These areas of stenosis are  similar to the prior CT angiogram. Patent cervical vasculature. No evident cervical carotid or vertebral artery stenosis.    MR BRAIN W/O CONTRAST 6/16/2024  1. Acute infarct in the right insular cortex and right frontal operculum. 2. Multifocal chronic infarcts, greatest in the left external capsule.    Endovascular procedure: None     Cardiac Monitoring: Patient had > 24 hrs of cardiac monitor while in hospital.    Findings: No atrial fibrillation was found.    Sleep Apnea Screen:   Questions/Answers    Sleep Apnea Screen Findings: Patient has 0-1 symptoms of sleep apnea.  Further sleep study is not recommended at this time.    PHQ-9 Depression Screen Score: 2    Education discussed with: patient and caregiver on blood pressure management, cholesterol management, medical management, and avoid smoking.    During daily rounds, the plan of care was discussed and developed with patient.  Plan of care includes: Medical treatment and follow-up plans .    PHYSICAL EXAMINATION  Vital Signs:  B/P: 151/83, T: 98, P: 86, R: 16    General:  patient lying in bed without any acute distress    HEENT:  normocephalic/atraumatic  Cardio:  RRR  Pulmonary:  no respiratory distress  Abdomen:  soft, non-tender  Extremities:  no edema  Skin:  intact      Neurologic  Mental Status:  alert, oriented only to self, follows simple commands, speech slow, naming and repetition normal  Cranial Nerves:  visual fields intact, PERRL, EOMI with normal smooth pursuit, facial sensation intact and symmetric, left facial droop with left flattened nasolabial fold, hearing not formally tested but intact to conversation, mild to moderate dysarthria, shoulder shrug strong bilaterally, tongue protrusion midline  Motor:  normal muscle tone and bulk, no abnormal movements, able to move all limbs spontaneously, strength 5/5 throughout upper and lower extremities, except for 5-/5 left finger extensors, no pronator drift  Reflexes:  toes down-going  Sensory:   light touch sensation intact and symmetric throughout upper and lower extremities, no extinction on double simultaneous stimulation   Coordination:  normal finger-to-nose and heel-to-shin bilaterally without dysmetria  Station/Gait:  deferred    National Institutes of Health Stroke Scale (on day of discharge)  NIHSS Total Score: 3    Modified [place] Score (Discharge)  4    Medications    Current Discharge Medication List        START taking these medications    Details   cefdinir (OMNICEF) 300 MG capsule Take 1 capsule (300 mg) by mouth daily for 7 days  Qty: 7 capsule, Refills: 0      clopidogrel (PLAVIX) 75 MG tablet Take 1 tablet (75 mg) by mouth daily for 89 days  Qty: 89 tablet, Refills: 0    Comments: stroke  Associated Diagnoses: Cerebrovascular accident (CVA), unspecified mechanism (H)      Glucagon (GVOKE HYPOPEN) 1 MG/0.2ML pen Inject the contents of 1 device under the skin into lower abdomen, outer thigh, or outer upper arm as needed for hypoglycemia. If no response after 15 minutes, additional 1 mg dose from a new device may be injected while waiting for emergency assistance.  Qty: 2 each, Refills: 0    Comments: Diabetes with hypoglycemia  Associated Diagnoses: Type 2 diabetes mellitus with hypoglycemia without coma, with long-term current use of insulin (H)      insulin aspart (NOVOLOG PENFILL) 100 UNIT/ML cartridge Inject 2-4 Units Subcutaneous 4 times daily (with meals and nightly) diabetes  Qty: 15 mL, Refills: 11    Associated Diagnoses: Type 2 diabetes mellitus with hypoglycemia without coma, with long-term current use of insulin (H)           CONTINUE these medications which have CHANGED    Details   insulin glargine (LANTUS PEN) 100 UNIT/ML pen Inject 13 Units Subcutaneous at bedtime  Qty: 90 mL, Refills: 0    Comments: If Lantus is not covered by insurance, may substitute Basaglar or Semglee or other insulin glargine product per insurance preference at same dose and frequency.    Associated  Diagnoses: Type 1 diabetes mellitus with other circulatory complication (H)           CONTINUE these medications which have NOT CHANGED    Details   amLODIPine (NORVASC) 5 MG tablet Take 1 tablet (5 mg) by mouth daily  Qty: 90 tablet, Refills: 3    Associated Diagnoses: Essential hypertension      aspirin (ASA) 81 MG chewable tablet Take 1 tablet (81 mg) by mouth daily  Qty: 90 tablet, Refills: 3    Associated Diagnoses: Acute CVA (cerebrovascular accident) (H)      atorvastatin (LIPITOR) 40 MG tablet Take 1 tablet (40 mg) by mouth daily  Qty: 90 tablet, Refills: 3    Comments: Annual renewal  Associated Diagnoses: Hyperlipidemia LDL goal <70      blood glucose (TRUE METRIX BLOOD GLUCOSE TEST) test strip USE TO TEST BLOOD SUGAR 4 TO 5 TIMES DAILY OR AS DIRECTED  Qty: 400 strip, Refills: 3    Associated Diagnoses: Type 1 diabetes mellitus with other circulatory complication (H)      blood glucose monitoring (NO BRAND SPECIFIED) meter device kit Use to test blood sugar 4 times daily.  Please provide glucose meter that is covered by insurance.  Qty: 1 kit, Refills: 0    Associated Diagnoses: Type 1 diabetes mellitus with other circulatory complication (H); Type 1 diabetes mellitus with diabetic polyneuropathy (H)      carvedilol (COREG) 12.5 MG tablet Take 1 tablet (12.5 mg) by mouth 2 times daily (with meals)  Qty: 180 tablet, Refills: 3    Associated Diagnoses: Essential hypertension      cyanocobalamin (VITAMIN B-12) 1000 MCG tablet Take 1 tablet (1,000 mcg) by mouth daily  Qty: 100 tablet, Refills: 3    Associated Diagnoses: Vitamin B12 deficiency (non anemic)      cyclobenzaprine (FLEXERIL) 10 MG tablet Take 1 tablet (10 mg) by mouth nightly as needed for muscle spasms  Qty: 30 tablet, Refills: 0    Associated Diagnoses: Fibromyalgia      diclofenac (VOLTAREN) 1 % topical gel Apply 2 g topically 4 times daily as needed for moderate pain  Qty: 150 g, Refills: 1    Associated Diagnoses: Pain in joint, ankle and  foot, right      DULoxetine (CYMBALTA) 20 MG capsule Take 1 capsule (20 mg) by mouth daily  Qty: 90 capsule, Refills: 3    Associated Diagnoses: Fibromyalgia      gabapentin (NEURONTIN) 100 MG capsule Take 1 capsule (100 mg) by mouth at bedtime  Qty: 90 capsule, Refills: 3    Associated Diagnoses: Type 1 diabetes mellitus with other circulatory complication (H)      insulin pen needle (31G X 8 MM) 31G X 8 MM miscellaneous Use 4 pen needles daily or as directed.  Qty: 300 each, Refills: 4    Associated Diagnoses: History of insulin dependent diabetes mellitus      levETIRAcetam (KEPPRA) 500 MG tablet Take 1 tablet (500 mg) by mouth 2 times daily  Qty: 180 tablet, Refills: 3    Associated Diagnoses: Seizures (H)      loratadine (CLARITIN) 10 MG tablet Take 1 tablet (10 mg) by mouth daily  Qty: 90 tablet, Refills: 3    Associated Diagnoses: Dermatitis      METHYLCELLULOSE, LAXATIVE, PO Take 1 Tablespoonful by mouth daily as needed      nystatin (MYCOSTATIN) 458914 UNIT/GM external cream Apply topically 2 times daily Until rash clear  Qty: 30 g, Refills: 3    Associated Diagnoses: Dermatitis      polyethylene glycol (MIRALAX) 17 GM/Dose powder Take 17 g (1 Capful) by mouth every morning As needed for constipation  Qty: 578 g, Refills: 3    Associated Diagnoses: Slow transit constipation      psyllium (METAMUCIL/KONSYL) capsule Take 1 capsule by mouth daily With 4-8 ounce fluid daily  Qty: 90 capsule, Refills: 3    Comments: Pharmacy to dispense capsule size that is available.  Associated Diagnoses: Slow transit constipation      SYNTHROID 75 MCG tablet Take 1 tablet (75 mcg) by mouth daily  Qty: 90 tablet, Refills: 3    Associated Diagnoses: Acquired hypothyroidism      Vitamin D3 (CHOLECALCIFEROL) 125 MCG (5000 UT) tablet Take 1 tablet (125 mcg) by mouth daily  Qty: 90 tablet, Refills: 3    Associated Diagnoses: Closed fracture of multiple ribs of right side, initial encounter      zinc oxide (DESITIN) 20 % external  ointment Apply topically as needed for dry skin or irritation  Qty: 85 g, Refills: 3    Associated Diagnoses: Fecal smearing           STOP taking these medications       Continuous Blood Gluc Sensor (FREESTYLE MARIA FERNANDA 2 SENSOR) [facility] [place]   Reason for Stopping:         insulin lispro (HUMALOG KWIKPEN) 100 UNIT/ML (1 unit dial) KWIKPEN Comments:   Reason for Stopping:               Additional recommendations and follow up:       Primary Care - Care Coordination Referral      Adult Neurology  Referral      Home Care Referral      Physical Therapy  Referral      Reason for your hospital stay    You were hospitalized and treated for stroke.     Activity    Your activity upon discharge: activity as tolerated     Follow Up ([facility])    Please follow-up with neurology outpatient in 6 to 8 weeks for poststroke risk factor management. Please follow-up with endocrinology for diabetic management and follow-up with primary care for management of blood pressure.    Appointments on [place] and/or [facility] (with [facility] or [facility] provider or service). Call [phone] if you haven't heard regarding these appointments within 7 days of discharge.     Miscellaneous DME Order    DME Documentation:   Describe the reason for need to support medical necessity: Patient has incontinence needing supplies arranged.     I, the undersigned, certify that the above prescribed supplies are medically necessary for this patient and is both reasonable and necessary in reference to accepted standards of medical and necessary in reference to accepted standards of medical practice in the treatment of this patient's condition and is not prescribed as a convenience.     Diet    Follow this diet upon discharge: Orders Placed This Encounter      Soft & Bite Sized Diet (level 6) Thin Liquids (level 0) (with 1:1 assist)     OGO PATCH MAIL OUT       Patient was seen and discussed with the Attending, Dr. Diaz.    Jess Nair,  MD  PGY-1 Neurology Resident

## 2024-06-18 NOTE — PROGRESS NOTES
Care Management Discharge Note    Discharge Date: 06/18/2024     Discharge Disposition: Home  Discharge Services: Resume home care, PCA services  Discharge DME: incontinence pullups and chux pads    Discharge Transportation: family or friend will provide    Private pay costs discussed: Not applicable    Does the patient's insurance plan have a 3 day qualifying hospital stay waiver?  Yes   Which insurance plan 3 day waiver is available? Alternative insurance waiver  Will the waiver be used for post-acute placement? No    Education Provided on the Discharge Plan: Yes  Persons Notified of Discharge Plans: Patient, daughter  Patient/Family in Agreement with the Plan: Yes    Handoff Referral Completed: Yes    Additional Information:  Patient status reviewed, discussed in discharge rounds. Per provider, patient is medically ready for discharge today.    [facility] accepted for home nursing. Home care orders placed for provider to co-sign.     Spoke with staff at [facility] who confirmed they received incontinent supplies orders. They have spoken with daughter directly to arrange delivery. They request provider adds quantity to order and signs their pended order - request relayed to provider.     Call placed to Karla Burkett ph: [phone], patient's , to discuss discharge planning. She did not answer so a voicemail was left requesting a call back.     Discharge plan discussed with patient's daughter who is in agreement.     Handoff complete.     3065 Addendum:  Callback received from armaan Acosta's . Provided Karla updates on discharge plan. Inquired if she would be able to follow up with patent's daughter regarding her request for [employer]. Karla confirmed she would be able to arrange this.     6863 Addendum:  Updated by SLP that they are changing their recommendation to home with home health SLP. Call placed to [facility] to add SLP. Orders updated and faxed to [facility] at this time.     Karla Burkett -   Ph: [phone]    [facility] - incontinence supplies  Ph: [phone]  Fax: [phone]    [facility] - accepted for RN/SLP  Ph: [phone]  Fax: [phone]    Andie Spencer RN, BSN  6A RN Care Coordinator  Ph: [phone]   Vocera: 6A Neuro RNCC

## 2024-06-18 NOTE — PLAN OF CARE
Status: admitted for acute infarct in the right insular cortex and right frontal operculum, on top of multifocal chronic infarcts, greatest in the left external capsule.  Vitals: htn within parameters, goal of SBP under 180  Neuros: no L facial droop noted by writer, baseline L side slightly weaker than R, slurred speech and mild dysphagia noted as chronic. Theses symptoms were acutely worsened at home when daughter called EMS but resolved by the time pt got to ED  IV: PIV SL'd  Labs/Electrolytes: BG checks as ordered, elevated to 484 this afternoon, downward trending with help of endocrine recs.  Resp/trach: WNL  Diet: tolerating soft and bite-sized diet with thin liquids, 1:1 feeder  Bowel status: LBM this shift, 6/17, continent  : voiding with some urgency incontinence  Skin: WNL, preventative mepilex to coccyx  Pain: denied  Activity: up with SBA, GB, walker  Social: daughter and PCA at bedside most of shift, supportive and helpful  Plan: discharging likely to home when medically ready  Updates this shift: pt pleasant entire shift    Arrived from: [facility]   Belongings/meds: in closet and sent home with daughter  2 RN Skin Assessment Completed by: writer and DELONTE Botello    Non-intact findings documented (yes/no/NA): WNL

## 2024-06-18 NOTE — PLAN OF CARE
Status: Admitted for acute infarct in the right insular cortex and right frontal operculum, on top of multifocal chronic infarcts, greatest in the left external capsule.  Vitals: VSS on RA ex/ HTN within 180 parameters. CCM  Neuros: A&Ox4, needs cuing at times. Some dysarthria and intermittently slow to respond. Baseline has L. Droop and L. Weakness though no droop noted and strength appeared 5/5 throughout. NIHSS 1  IV: PIV SL'd  Labs/Electrolytes: BG at 0200 181  Resp/trach: WNL  Diet: Soft and bite-sized diet with thin liquids, 1:1 feeder. Pills whole   Bowel status: LBM 6/17  : voiding with some urgency incontinence, purewick placed overnight  Skin: WNL, preventative mepilex to coccyx  Pain: denied  Activity: SBA, GB, walker. PCDs ordered  Plan: Hold PTA amlodipine. Continue dual antiplatelet therapy (ASA 81+ Plavix 75) for a total of 90 days then continue with aspirin monotherapy. PT/OT recommending HWA

## 2024-06-18 NOTE — PHARMACY-ADMISSION MEDICATION HISTORY
Pharmacist Admission Medication History    Admission medication history is complete. The information provided in this note is only as accurate as the sources available at the time of the update.    Information Source(s): Patient, Family member, and CareEverywhere/SureScripts via in-person and phone    Pertinent Information: Attempted in person medication history with patient but she reports that her daughter, Vishal, takes care of her medications for her. Vishal is a reliable historian of patient's medications prior to admission and verified patient's allergy list.   Insulin glargine: reports patient taking 20 units at bedtime    Changes made to PTA medication list:  Added: None  Deleted:   Free-style Mendoza: reports using glucometer at home  Changed: None    Allergies reviewed with patient and updates made in EHR: yes    Medication History Completed By: Neil Russ RPH 6/18/2024 9:53 AM    PTA Med List   Medication Sig Last Dose    amLODIPine (NORVASC) 5 MG tablet Take 1 tablet (5 mg) by mouth daily 6/15/2024    aspirin (ASA) 81 MG chewable tablet Take 1 tablet (81 mg) by mouth daily 6/15/2024    atorvastatin (LIPITOR) 40 MG tablet Take 1 tablet (40 mg) by mouth daily 6/15/2024    blood glucose (TRUE METRIX BLOOD GLUCOSE TEST) test strip USE TO TEST BLOOD SUGAR 4 TO 5 TIMES DAILY OR AS DIRECTED     blood glucose monitoring (NO BRAND SPECIFIED) meter device kit Use to test blood sugar 4 times daily.  Please provide glucose meter that is covered by insurance.     carvedilol (COREG) 12.5 MG tablet Take 1 tablet (12.5 mg) by mouth 2 times daily (with meals) 6/15/2024    cefdinir (OMNICEF) 300 MG capsule Take 1 capsule (300 mg) by mouth daily for 7 days     cyanocobalamin (VITAMIN B-12) 1000 MCG tablet Take 1 tablet (1,000 mcg) by mouth daily 6/15/2024    cyclobenzaprine (FLEXERIL) 10 MG tablet Take 1 tablet (10 mg) by mouth nightly as needed for muscle spasms Past Month    diclofenac (VOLTAREN) 1 % topical gel Apply 2  g topically 4 times daily as needed for moderate pain Past Month    DULoxetine (CYMBALTA) 20 MG capsule Take 1 capsule (20 mg) by mouth daily 6/15/2024    gabapentin (NEURONTIN) 100 MG capsule Take 1 capsule (100 mg) by mouth at bedtime 6/15/2024    insulin glargine (LANTUS PEN) 100 UNIT/ML pen Inject 20 Units Subcutaneous at bedtime 6/15/2024    insulin lispro (HUMALOG KWIKPEN) 100 UNIT/ML (1 unit dial) KWIKPEN LOW INSULIN RESISTANCE DOSING Do Not give Bedtime Correction Insulin if BG less than 225. For - 299 give 2 unit. For  - 399 give 4 units For BG greater than or equal 400 give 5 units add 1 unit per 30 gram carb Max dose is 11 units per day 6/15/2024    insulin pen needle (31G X 8 MM) 31G X 8 MM miscellaneous Use 4 pen needles daily or as directed.     levETIRAcetam (KEPPRA) 500 MG tablet Take 1 tablet (500 mg) by mouth 2 times daily 6/15/2024    loratadine (CLARITIN) 10 MG tablet Take 1 tablet (10 mg) by mouth daily 6/15/2024    METHYLCELLULOSE, LAXATIVE, PO Take 1 Tablespoonful by mouth daily as needed Past Month    nystatin (MYCOSTATIN) 085838 UNIT/GM external cream Apply topically 2 times daily Until rash clear Past Month    polyethylene glycol (MIRALAX) 17 GM/Dose powder Take 17 g (1 Capful) by mouth every morning As needed for constipation Past Month    psyllium (METAMUCIL/KONSYL) capsule Take 1 capsule by mouth daily With 4-8 ounce fluid daily 6/15/2024    SYNTHROID 75 MCG tablet Take 1 tablet (75 mcg) by mouth daily 6/15/2024    Vitamin D3 (CHOLECALCIFEROL) 125 MCG (5000 UT) tablet Take 1 tablet (125 mcg) by mouth daily 6/15/2024    zinc oxide (DESITIN) 20 % external ointment Apply topically as needed for dry skin or irritation Past Month

## 2024-06-18 NOTE — PROGRESS NOTES
Inpatient Diabetes Management Service: Daily Progress Note     HPI: Isabell Matthews is a 63 year old female with type 2 vs type 1 diabetes mellitus (insulin dependent? Hx of multiple hospitalizations for DKA), diabetic polyneuropathy, vascular vs Alzheimer dementia, multiple prior strokes (right basal ganglia ischemic infarct in 2018, left basal ganglia hemorrhagic stroke 2021, right centrum semiovale & right chuck 2023), history of seizures, hypertension, hyperlipidemia, chronic kidney disease, recurrent UTI, presented 6/16/2024 with sudden onset left-sided facial droop and right-sided weakness/incoordination. Found to have  acute/subacute right insular and right frontal operculum infarct 2/2 ESUS vs ICAD.          Assessment/Plan:     Assessment:   Type 2 IDDM vs Type 1 Diabetes Mellitus, complicated by peripheral neuropathy, nephropathy, hx of DKA and hypoglycemia. Sub-optimal control, last A1c 10.2% (5/20/2024).   Dementia - daughter is caregiver and manages diabetes for patient.      Plan/Recommendations:    - Increase Lantus from 10 to 13 units q 24 hrs and move to 1900 today  - Intensity Novolog Meal Coverage: from 1:30 to 1:25 g CHO, TID AC and PRN with snacks/supplements   - Continue Novolog Correction Scale: custom low resistance 1/100 >200 TID AC and HS.   - BG monitoring: TID AC, HS, 0200  - C-peptide result low <0.1 with glucose high around 482 at the time - would recommend repeating when BG is not as elevated. DEMETRIA AB ordered 6/17, results pending.   - Hypoglycemia protocol    - Carb counting protocol      Discussion:  Significant hyperglycemia yesterday afternoon following lunch 2/2 no carbohydrate coverage or correction with lunch (insulin was not available on unit in time). BG improved overnight. Patient discharging today, see discharge plan below - reviewed discharge plan with patient's daughter/caregiver, Maria Guadalupe.      Discharge Planning:  Medications: See below. Daughter would like  glucagon at discharge. Not interested in re-trying CGM.   Test Claims: ordered 6/17 for glucagon. - Gvoke to be prescribed by primary team at discharge.   Education:  None, patient's daughter manages insulins and BG checks.   Outpatient Follow-up: patient's daughter prefers to continue to follow with PCP - Bony Castaneda    Diabetes Management Team Discharge Recommendations:   Instructions to patient were posted in AVS and discussed on day of discharge.   Medications and supplies are to be ordered by primary service on discharge.   *please use the DIAB non-branded discharge supply order set ([id number])*    Patient will need the following supplies prescribed: GVOKE (glucagon) PRN for severe hypoglycemia.     Blood Glucose Checks: three times daily before meals, and at bedtime via glucometer.    Diabetes Medications:     1) Insulin Lantus 13 units once daily at 7pm today, 9-10pm tomorrow and continued at bedtime moving forward.    *IF fasting glucose >180, increase by 2 units daily back up to prior to admission dose of 20 units daily (do NOT exceed 20 units daily)*    2) Insulin Humalog 2-4 units three times daily with meals based on experience (or try 1 unit per 25 grams of carbohydrates) plus correction scale: 1 unit per 100>200 before meals and at bedtime.    Correction scale before breakfast, lunch, dinner, bedtime:  - Combine with set meal dose above and give all at one time, ideally 10-15 minutes before eating.  - If skipping a meal or it is bedtime, skip set meal dose above, still use this correction scale (make sure to separate by at least 4 hours from previous correction scale dose)  Do Not give Correction Insulin if BG less than 200.   For  - 299 give 1 unit.   For  - 399 give 2 units   For BG greater than or equal 400 give 3 units.   Notify provider if glucose greater than or equal to 350 mg/dL after administration of correction dose.    Diabetes Outpatient follow up: Has next scheduled  follow up with PCP, Dr. Castaneda 10/7/2024. Please follow up sooner if blood glucose runs consistently greater than 180 mg/dL or having episodes less than 70 mg/dL.       Please notify Inpatient Diabetes Service if changes are planned to steroids, nutrition, TPN/TF and anticipated procedures requiring prolonged NPO status.         Interval History/Review of Systems :   - The last 24 hours progress and nursing notes reviewed.  - Patient feels well. Normal appetite without nausea, emesis, diarrhea, or constipation.  - Discharging today, patient's daughter agreeable to discharge plan.     Planned Procedures/Surgeries: none    Inpatient Glucose Control:       Recent Labs   Lab 06/18/24  0150 06/17/24  2117 06/17/24  2023 06/17/24  1742 06/17/24  1623 06/17/24  1503   * 243* 267* 436* 456* 482*             Medications Impacting Glycemia:   Steroids:  none   D5W-containing solutions/medications: none   Other medications impacting glucose: none         Nutrition:   Orders Placed This Encounter      Soft & Bite Sized Diet (level 6) Thin Liquids (level 0) (with 1:1 assist)  Supplements: none   TF: none   TPN: none         Diabetes History: see full consult note for complete diabetes history   Diabetes Type and Duration: DM for  many years, diagnosed in her 40s. Per daughter,  initially told she has T2DM then T1DM for a while then flipped back to being told T2DM.   - [facility]RI[facility] ab [facility]  - CT abdomen/pelvis 6/16/2023 - pancreas unremarkable   - No known hx of pancreatitis per daughter      PTA Medication Regimen: Daughter dials insulin for patient, patient injects.  - Lantus 20 units daily HS  - Humalog, daughter varies dose based on patient's BG and what she is eating. Follows correction scale 1/100 >200 before meals and at bedtime. Usually daughter gives total Humalog of 2-4 units with meals which includes mealtime dose + correction scale.     Historical Diabetes Medications: metformin  at diagnosis wasn't working, started insulin shortly after diagnosis.     Glucose monitoring device and frequency: glucometer before meals and at bedtime. Has tried and failed multiple CGMs (issues with accuracy or patient picking CGM off). Fasting BG usually around 150s, highly variable control throughout the day - sometimes up to 400s but will drop quickly with insulin. Low BG <70 about once every 2 weeks, sometimes multiple times a week and most often lows occur fasting AM. Hx of severe low a few months ago 2/2 patient grabbing Novolog and injecting 20 units (daughter now keeps insulin out of patient's reach)     Outpatient Diabetes Provider: PCP - Bony Castaneda (last visit 5/20/2024)     Formal Diabetes Education/Educator: no        Physical Exam:   BP (!) 150/85 (BP Location: Left arm)   Pulse 84   Temp 97.8  F (36.6  C) (Oral)   Resp 16   SpO2 99%   General:  well appearing, NAD, resting comfortably in chair.  Lungs: unlabored breathing on RA  ABD:  rounded, soft, non-tender  Mental Status:  pleasantly confused.   Psych:   Cooperative, good eye contact, confused.           Data:     Lab Results   Component Value Date    A1C 10.2 (H) 05/20/2024    A1C 10.7 (H) 01/29/2024    A1C 9.3 (H) 09/04/2023    A1C 10.8 (H) 05/20/2023    A1C 11.2 (H) 03/29/2023    A1C 7.8 (H) 06/21/2021    A1C 11.7 (H) 09/27/2020       ROUTINE IP LABS (Last four results)  BMP  Recent Labs   Lab 06/18/24  0150 06/17/24  2117 06/17/24 2023 06/17/24  1742 06/17/24  0601 06/17/24  0529 06/16/24  1209 06/16/24  0846 06/16/24  0824   NA  --   --   --   --   --  140  --   --  139   POTASSIUM  --   --   --   --   --  3.8  --   --  4.2   CHLORIDE  --   --   --   --   --  107  --   --  107   VERONICA  --   --   --   --   --  8.6*  --   --  8.8   CO2  --   --   --   --   --  23  --   --  23   BUN  --   --   --   --   --  25.5*  --   --  34.4*   CR  --   --   --   --   --  1.38*  --  1.9* 1.73*   * 243* 267* 436*   < > 210*   < >  --   368*    < > = values in this interval not displayed.     CBC  Recent Labs   Lab 06/17/24  0529 06/16/24  0824   WBC 7.8 4.5   RBC 4.04 3.86   HGB 11.4* 10.9*   HCT 35.6 34.4*   MCV 88 89   MCH 28.2 28.2   MCHC 32.0 31.7   RDW 14.5 14.6    180         Inpatient Diabetes Service will continue to follow, please don't hesitate to contact the team with any questions or concerns.     Karrie Flood PA-C  Inpatient Diabetes Service  Pager: [phone]  Available on [employer]      Plan discussed with patient, bedside RN, and primary team via this note.    To contact Inpatient Diabetes Service:     7 AM - 5 PM: Page the [facility] following the patient that day (see filed or incomplete progress notes/consult notes under Endocrinology)    OR if uncertain of provider assignment: page job code [phone]    5 PM - 7 AM: First call after hours is to primary service.    For urgent after-hours questions, page job code for on call fellow: [phone]     I spent a total of 40 minutes on the date of the encounter doing prep/post-work, chart review, history and exam, documentation and further activities per the note including lab review, multidisciplinary communication, counseling the patient and/or coordinating care regarding acute hyper/hypoglycemic management, as well as discharge management and planning/communication.

## 2024-06-19 ENCOUNTER — PATIENT OUTREACH (OUTPATIENT)
Dept: CARE COORDINATION | Facility: CLINIC | Age: 64
End: 2024-06-19
Payer: COMMERCIAL

## 2024-06-19 NOTE — LETTER
PRIMARY CARE CARE COORDINATION   [facility] [place]  [address] [place] [address]    July 1, 2024    Isabell Matthews  [address]  [place] [address]      Dear Isabell,      I am a clinic care coordinator who works with Bony Castaneda MD with the Primary Care clinic at the [facility] I have been trying to reach you recently to introduce Clinic Care Coordination. I recently tried to call and was unable to reach you. Below is a description of clinic care coordination and how I can further assist you.       The clinic care coordinator nurse is a nurse who understands the health care system. The goal of clinic care coordination is to help you manage your health and improve access to the health care system. Our team works alongside your provider to assist you in determining your health and social needs. We can help you obtain health care and community resources, providing you with necessary information and education. We can work with you through any barriers and develop a care plan that helps coordinate and strengthen the communication between you and your care team. Our services are voluntary and are offered without charge to you personally.    Please feel free to contact me with any questions or concerns regarding care coordination and what we can offer.      We are focused on providing you with the highest-quality healthcare experience possible.    Sincerely,     DELONTE Flanagan Care Manager  Primary Care Clinic   Phone: [phone]  Fax: [phone]

## 2024-06-19 NOTE — PROGRESS NOTES
Clinic Care Coordination Contact  [facility]/Voicemail    Clinical Data: Care Coordinator Outreach    Outreach Documentation Number of Outreach Attempt   6/19/2024   8:29 AM 1       Left message on patient's daughter's voicemail with call back information and requested return call. Patient's daughter is caregiver, though no consent to communicate on file. RN left non-detailed VM requesting scheduling help for hospital follow up appointment and inquiring to speak with patient.     Plan: Care Coordinator will try to reach patient again in 1-2 business days via [employer] if no return call.     JESSICA MCCABE RN on 6/19/2024 at 8:30 AM    Update:  RN did not receive return call from pt. RN did send Crediblet message to patient offering CC services.     JESSICA MCCABE RN on 6/26/2024 at 2:25 PM

## 2024-06-19 NOTE — CARE PLAN
Occupational Therapy Discharge Summary    Reason for therapy discharge:    Discharged to home with home therapy.    Progress towards therapy goal(s). See goals on Care Plan in [facility] electronic health record for goal details.  Goals partially met.  Barriers to achieving goals:   discharge from facility.    Therapy recommendation(s):    Continue home exercise program.

## 2024-06-19 NOTE — PLAN OF CARE
Speech Language Therapy Discharge Summary    Reason for therapy discharge:    Discharged to home with home therapy.    Progress towards therapy goal(s). See goals on Care Plan in [facility] electronic health record for goal details.  Goals partially met.  Barriers to achieving goals:   discharge from facility.    Therapy recommendation(s):    Continued therapy is recommended.  Rationale/Recommendations:  Continued ST for dysphagia as appropriate..    Recommend continue soft and bite sized diet (IDDSI 6) and thin liquids (IDDSI 0) with 1:1 supervision. Recommend medications as tolerated. Please cue pt to take small, single sips/bites at a slow rate and reduce distractions while eating and drinking.

## 2024-06-20 ENCOUNTER — MEDICAL CORRESPONDENCE (OUTPATIENT)
Dept: HEALTH INFORMATION MANAGEMENT | Facility: CLINIC | Age: 64
End: 2024-06-20

## 2024-06-20 DIAGNOSIS — K59.01 SLOW TRANSIT CONSTIPATION: ICD-10-CM

## 2024-06-20 LAB — GAD65 AB SER IA-ACNC: 6.2 IU/ML

## 2024-06-28 RX ORDER — POLYETHYLENE GLYCOL 3350 17 G/17G
17 POWDER, FOR SOLUTION ORAL DAILY PRN
Qty: 510 G | Refills: 5 | Status: ON HOLD | OUTPATIENT
Start: 2024-06-28 | End: 2024-08-28

## 2024-07-01 NOTE — PROGRESS NOTES
Clinic Care Coordination Contact  [facility]    Clinical Data: Care Coordinator Outreach    Outreach Documentation Number of Outreach Attempt   6/19/2024   8:29 AM 1   7/1/2024   2:43 PM 2       RN unable to LVM, phone picked up but there was not a person on the other line.     Plan: Care Coordinator will send unable to contact letter with care coordinator contact information via mail. Care Coordinator will do no further outreaches at this time.    JESSICA MCCABE RN on 7/1/2024 at 2:46 PM

## 2024-07-08 ENCOUNTER — OFFICE VISIT (OUTPATIENT)
Dept: FAMILY MEDICINE | Facility: CLINIC | Age: 64
End: 2024-07-08
Payer: COMMERCIAL

## 2024-07-08 VITALS
HEART RATE: 84 BPM | OXYGEN SATURATION: 97 % | WEIGHT: 137 LBS | BODY MASS INDEX: 24.27 KG/M2 | TEMPERATURE: 97.9 F | DIASTOLIC BLOOD PRESSURE: 66 MMHG | SYSTOLIC BLOOD PRESSURE: 121 MMHG

## 2024-07-08 DIAGNOSIS — E10.10 DIABETIC KETOACIDOSIS WITHOUT COMA ASSOCIATED WITH TYPE 1 DIABETES MELLITUS (H): ICD-10-CM

## 2024-07-08 DIAGNOSIS — N18.32 STAGE 3B CHRONIC KIDNEY DISEASE (H): ICD-10-CM

## 2024-07-08 DIAGNOSIS — F06.31 DEPRESSION DUE TO ACUTE STROKE (H): ICD-10-CM

## 2024-07-08 DIAGNOSIS — Z09 HOSPITAL DISCHARGE FOLLOW-UP: Primary | ICD-10-CM

## 2024-07-08 DIAGNOSIS — E55.9 VITAMIN D DEFICIENCY: ICD-10-CM

## 2024-07-08 DIAGNOSIS — N39.42 URINARY INCONTINENCE WITHOUT SENSORY AWARENESS: ICD-10-CM

## 2024-07-08 DIAGNOSIS — I63.9 ACUTE CVA (CEREBROVASCULAR ACCIDENT) (H): ICD-10-CM

## 2024-07-08 DIAGNOSIS — I63.9 DEPRESSION DUE TO ACUTE STROKE (H): ICD-10-CM

## 2024-07-08 DIAGNOSIS — R41.89 COGNITIVE DECLINE: ICD-10-CM

## 2024-07-08 DIAGNOSIS — F01.50 VASCULAR DEMENTIA WITHOUT BEHAVIORAL DISTURBANCE (H): ICD-10-CM

## 2024-07-08 DIAGNOSIS — I10 ESSENTIAL HYPERTENSION: ICD-10-CM

## 2024-07-08 DIAGNOSIS — E03.9 ACQUIRED HYPOTHYROIDISM: ICD-10-CM

## 2024-07-08 PROCEDURE — G2211 COMPLEX E/M VISIT ADD ON: HCPCS | Performed by: FAMILY MEDICINE

## 2024-07-08 PROCEDURE — 99215 OFFICE O/P EST HI 40 MIN: CPT | Performed by: FAMILY MEDICINE

## 2024-07-08 RX ORDER — RESPIRATORY SYNCYTIAL VIRUS VACCINE 120MCG/0.5
0.5 KIT INTRAMUSCULAR ONCE
Qty: 1 EACH | Refills: 0 | Status: CANCELLED | OUTPATIENT
Start: 2024-07-08 | End: 2024-07-08

## 2024-07-08 NOTE — PATIENT INSTRUCTIONS
Tdap ( boostrix or adecel) through local pharmacy    Shingrix two shot series  RSV vaccine      Covid and influenza vaccines in the fall.    Get labs in September or before 10/7 appt

## 2024-07-08 NOTE — PROGRESS NOTES
Assessment & Plan     Hospital discharge follow-up  Diabetic ketoacidosis without coma associated with type 1 diabetes mellitus (H)  Acute CVA (cerebrovascular accident) (H)    Comes in today for follow-up of hospital inpatient  stay for stroke with a left facial droop now has resolved.  She will be continuing on Plavix for 3 months and aspirin 81 mg daily per usual.  They have neurology and ophthalmology follow-up.  They feel she is back to baseline.  - Hemoglobin A1c  - Comprehensive metabolic panel (BMP + Alb, Alk Phos, ALT, AST, Total. Bili, TP)        Depression due to acute stroke (H)  Vascular dementia without behavioral disturbance (H)  Cognitive decline  She has vascular dementia and some depression maintains on duloxetine 20 mg.  She may qualify for home iPad per her daughter in order to communicate with other people to remain connected.  She currently is attending an adult  at times.  - CBC with platelets and differential    Essential hypertension  Her blood pressure is at goal today continuing on Coreg 12.5 mg twice daily and amlodipine 5 mg daily  - CBC with platelets and differential  - Comprehensive metabolic panel (BMP + Alb, Alk Phos, ALT, AST, Total. Bili, TP)    Stage 3b chronic kidney disease (H)  Has been stable encouraged hydration    Acquired hypothyroidism  Is on Synthroid 75 mcg daily most recent thyroid function was in range.          Urinary incontinence without sensory awareness  She requires incontinence supplies adult medium size briefs and judy pads 90 for each months with 12-month refills to be faxed to her care coordinator.  I also provided copies to her daughter today.  - Incontinence Supplies Order for DME - ONLY FOR DME  - Incontinence Supplies Order for DME - ONLY FOR DME    DME (Durable Medical Equipment) Orders and Documentation  Orders Placed This Encounter   Procedures    Incontinence Supplies Order for DME - ONLY FOR DME    Incontinence Supplies Order for DME -  ONLY FOR DME      The patient was assessed and it was determined the patient is in need of the following listed DME Supplies/Equipment. Please complete supporting documentation below to demonstrate medical necessity.      Incontinence Supplies Documentation  Incontinence supplies are needed due to incontinence with sensory unawareness.                       MED REC REQUIREDPost Medication Reconciliation Status:    Reviewed and updated      The longitudinal plan of care for the diagnosis(es)/condition(s) as documented were addressed during this visit. Due to the added complexity in care, I will continue to support Isabell in the subsequent management and with ongoing continuity of care.  48 minutes spent on the date of the encounter doing chart review, history, exam, diagnostics review, documentation, evaluation & management, counseling and coordination of cares as noted.   Return in about 13 weeks (around 10/7/2024).  Bony Castaneda MD   Subjective   Isabell is a 63 year old, presenting for the following health issues:  Follow Up (ED follow up - stroke )        7/8/2024     9:04 AM   Additional Questions   Roomed by MR   Accompanied by daughter, ángel     [facility] Follow-up Visit:    Hospital/Nursing Home/ Rehab Facility: [facility]  Date of Admission: 6/16/2024  Date of Discharge: 6/18/2024  Reason(s) for Admission:    History of prior strokes (right basal ganglia ischemic infarct in 2018, left basal ganglia hemorrhagic stroke 2021, right centrum semiovale & right chuck infarct 2023)    Baseline facial droop and dysarthria, dysphagia. Feels the left sided droop has resolved.  High-grade stenosis of MCA M2 segment and A2 segment : Plavix 75 mg added for three months and continue ASA 81   Hx of seizures Keppra 500 mg twice dialy   Hypertension :Corge 12.5 mg twice daily and amlodipine 5 mg  Hyperlipidemia Atorvastain 40 mg daily   Diabetes mellitus    Lantus  13 units daily increased by her daughter to 18 units over time, Humalog 2-4 units with meals plus correction scale 1 unit per 100 >200 TID AC and HS. We also prescribed Gvoke (glucagon) for episodes of hypoglycemia.  Not due for A1C until after 8/20/2024   UTI, recurrent  Ceftriaxone in Hospital and Bactrim at home for 3 days. No current symptms  Do you have any other stressors you would like to discuss with your provider? OTHER: Would like ipad as she has depression qualifies for connection with other people via Ipad.  Financial assistance through the [facility], and care coordinator,does not need additional assistance through our office such as care coordinator.   Need incontinence supplies [facility]    phone [phone]   [phone]  Home  supplies Consumer directions  Problems taking medications regularly:  no problem, her daughter assists  Medication changes since discharge: reviewed insulin  Problems adhering to non-medication therapy:  no concerns. She goes to a day center.  Has appt for neurology and eye appt    Summary of hospitalization:  [facility] discharge summary reviewed  Diagnostic Tests/Treatments reviewed.  Follow up needed:  she has scheduled foollow up with me in October   Other Healthcare Providers Involved in Patient s Care: Neurology and ophthalmology     Update since discharge: improved.      They declined ordering DEXA.    Plan of care communicated with patient and daughter             Labs reviewed in EPIC  BP Readings from Last 3 Encounters:   07/08/24 121/66   06/18/24 (!) 151/83   05/20/24 112/75    Wt Readings from Last 3 Encounters:   07/08/24 62.1 kg (137 lb)   03/07/24 61.2 kg (135 lb)   01/29/24 60 kg (132 lb 4.8 oz)                  Patient Active Problem List   Diagnosis    Acquired hypothyroidism    Seizures (H)    Hyperlipidemia LDL goal <100    DKA (diabetic ketoacidoses)    Nephrolithiasis    Left renal mass    Coronary  atherosclerosis    Dehydration    Ischemic vascular dementia (H)    Other osteoarthritis of spine, unspecified spinal region    Vitamin D deficiency    Vitamin B12 deficiency (non anemic)    Seizures (H)    Nausea & vomiting    Nail deformity    Mixed stress and urge urinary incontinence    Luetscher's syndrome    Left-sided nontraumatic intracerebral hemorrhage, unspecified cerebral location (H)    Vascular dementia without behavioral disturbance (H)    Fungal dermatitis    Type 1 diabetes mellitus with diabetic polyneuropathy (H)    Chest pressure    Essential hypertension    Dysphagia    Tobacco use    Dysthymia    History of diabetes with ketoacidosis    Stage 3b chronic kidney disease (H)    Full incontinence of feces    Acute CVA (cerebrovascular accident) (H)    Altered mental status, unspecified altered mental status type    Hyperglycemia    Infection due to 2019 novel coronavirus    Hx: UTI (urinary tract infection)    Fibromyalgia    E. 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)    Acute cystitis without hematuria    Cerebrovascular accident (CVA), unspecified mechanism (H)    Severe dementia without behavioral disturbance, psychotic disturbance, mood disturbance, or anxiety, unspecified dementia type (H)    Diabetic ketoacidosis without coma associated with type 1 diabetes mellitus (H)    Cognitive decline    Nausea and vomiting, unspecified vomiting type    Closed fracture of multiple ribs of right side, initial encounter    TIA (transient ischemic attack)    Ischemic stroke (H)     Past Surgical History:   Procedure Laterality Date    athroplasty hip Bilateral     2003, 2006    OTHER SURGICAL HISTORY      athroplasty hip    PICC DOUBLE LUMEN PLACEMENT Right 06/11/2023    right basilic 5 fr dl power picc 39 cm    STENT         Social History      Tobacco Use    Smoking status: Former     Current packs/day: 0.00     Average packs/day: 0.5 packs/day for 35.0 years (17.5 ttl pk-yrs)     Types: Cigarettes     Start date: 1983     Quit date: 2018     Years since quittin.0    Smokeless tobacco: Never   Substance Use Topics    Alcohol use: Not Currently     Family History   Problem Relation Age of Onset    Acute Myocardial Infarction Father     Heart Disease Maternal Grandmother     Dementia Maternal Grandmother     Myocardial Infarction Father     Dementia Paternal Grandmother     Heart Disease Paternal Grandmother          Current Outpatient Medications   Medication Sig Dispense Refill    amLODIPine (NORVASC) 5 MG tablet Take 1 tablet (5 mg) by mouth daily 90 tablet 3    aspirin (ASA) 81 MG chewable tablet Take 1 tablet (81 mg) by mouth daily 90 tablet 3    atorvastatin (LIPITOR) 40 MG tablet Take 1 tablet (40 mg) by mouth daily 90 tablet 3    blood glucose (TRUE METRIX BLOOD GLUCOSE TEST) test strip USE TO TEST BLOOD SUGAR 4 TO 5 TIMES DAILY OR AS DIRECTED 400 strip 3    blood glucose monitoring (NO BRAND SPECIFIED) meter device kit Use to test blood sugar 4 times daily.  Please provide glucose meter that is covered by insurance. 1 kit 0    carvedilol (COREG) 12.5 MG tablet Take 1 tablet (12.5 mg) by mouth 2 times daily (with meals) 180 tablet 3    clopidogrel (PLAVIX) 75 MG tablet Take 1 tablet (75 mg) by mouth daily for 89 days 89 tablet 0    cyanocobalamin (VITAMIN B-12) 1000 MCG tablet Take 1 tablet (1,000 mcg) by mouth daily 100 tablet 3    cyclobenzaprine (FLEXERIL) 10 MG tablet Take 1 tablet (10 mg) by mouth nightly as needed for muscle spasms 30 tablet 0    diclofenac (VOLTAREN) 1 % topical gel Apply 2 g topically 4 times daily as needed for moderate pain 150 g 1    DULoxetine (CYMBALTA) 20 MG capsule Take 1 capsule (20 mg) by mouth daily 90 capsule 3    gabapentin (NEURONTIN) 100 MG capsule Take 1 capsule (100 mg) by mouth at bedtime 90  capsule 3    Glucagon (GVOKE HYPOPEN) 1 MG/0.2ML pen Inject the contents of 1 device under the skin into lower abdomen, outer thigh, or outer upper arm as needed for hypoglycemia. If no response after 15 minutes, additional 1 mg dose from a new device may be injected while waiting for emergency assistance. 2 each 0    insulin aspart (NOVOLOG FLEXPEN) 100 UNIT/ML pen Inject 2-4 units with meals plus correction scale 1 unit per 100 >200 three times daily before meals and at bedtime. For Pre-Meal  - 164 give 1 unit.  For Pre-Meal  - 189 give 2 units.  For Pre-Meal  - 214 give 3 units.  For Pre-Meal  - 239 give 4 units.  For Pre-Meal  - 264 give 5 units.  For Pre-Meal  - 289 give 6 units.  For Pre-Meal  - 314 give 7 units.  For Pre-Meal  - 339 give 8 units.  For Pre-Meal  - 364 give 9 units.  For Pre-Meal BG greater than or equal to 365 give 10 units. FOR BEDTIME:  For  - 224 give 1 unit.  For  - 249 give 2 units.  For  - 274 give 3 units.  For  - 299 give 4 units.  For  - 324 give 5 units.  For  - 349 give 6 units.  For BG greater than or equal to 350 give 7 units.      insulin aspart (NOVOLOG PENFILL) 100 UNIT/ML cartridge Inject 2-4 Units Subcutaneous 4 times daily (with meals and nightly) diabetes 15 mL 11    insulin pen needle (31G X 8 MM) 31G X 8 MM miscellaneous Use 4 pen needles daily or as directed. 300 each 4    levETIRAcetam (KEPPRA) 500 MG tablet Take 1 tablet (500 mg) by mouth 2 times daily 180 tablet 3    loratadine (CLARITIN) 10 MG tablet Take 1 tablet (10 mg) by mouth daily 90 tablet 3    METHYLCELLULOSE, LAXATIVE, PO Take 1 Tablespoonful by mouth daily as needed      nystatin (MYCOSTATIN) 324569 UNIT/GM external cream Apply topically 2 times daily Until rash clear 30 g 3    polyethylene glycol (MIRALAX) 17 GM/Dose powder Take 17 g by mouth daily as needed for constipation 510 g 5    psyllium (METAMUCIL/KONSYL)  capsule Take 1 capsule by mouth daily With 4-8 ounce fluid daily 90 capsule 3    SYNTHROID 75 MCG tablet Take 1 tablet (75 mcg) by mouth daily 90 tablet 3    Vitamin D3 (CHOLECALCIFEROL) 125 MCG (5000 UT) tablet Take 1 tablet (125 mcg) by mouth daily 90 tablet 3    zinc oxide (DESITIN) 20 % external ointment Apply topically as needed for dry skin or irritation 85 g 3     Allergies   Allergen Reactions    Seasonal Allergies      Recent Labs   Lab Test 06/17/24  0529 06/16/24  0846 06/16/24  0824 05/20/24  1538 03/07/24  0109 01/29/24  1655 10/16/23  1750 09/05/23  0645 09/04/23  0228 06/18/23  0745 05/21/23  0627 05/20/23  1312 03/29/23  1158 08/02/21  1009 07/07/21  0630 06/27/21  0759   A1C  --   --   --  10.2*  --  10.7*  --   --  9.3*  --   --  10.8* 11.2*   < >  --   --    LDL  --   --  67  --   --   --   --   --   --   --   --  71 72   < >  --   --    HDL  --   --  43*  --   --   --   --   --   --   --   --  35* 48*   < >  --   --    TRIG  --   --  119  --   --   --   --   --   --   --   --  152* 105   < >  --   --    ALT  --   --   --  17  --   --  24  --   --  24   < > 17 39*   < >  --   --    CR 1.38* 1.9* 1.73* 1.63*   < > 1.64* 1.66*   < > 1.64* 1.25*   < > 1.71*  1.71* 1.62*   < > 1.68* 1.48*   GFRESTIMATED 42* 29* 32* 35*   < > 34* 34*   < > 34* 48*   < > 33*  33* 35*   < > 31* 37*   GFRESTBLACK  --   --   --   --   --   --   --   --   --   --   --   --   --   --  37* 43*   POTASSIUM 3.8  --  4.2 5.1   < > 4.7 4.3   < > 4.3 4.2   < > 4.3 4.1   < > 3.9 3.5   TSH  --   --   --  0.80  --   --  0.59   < >  --   --    < >  --  2.38   < >  --   --     < > = values in this interval not displayed.               Review of Systems  Problem list, PMH, Surgical HX, SH, allergies, medications,immunizations reviewed and updated in Epic.  ROS noted in HPI and ROS,staff / patient questionnaires reviewed by me.         Objective    /66 (BP Location: Right arm, Patient Position: Sitting, Cuff Size: Adult Regular)    Pulse 84   Temp 97.9  F (36.6  C)   Wt 62.1 kg (137 lb)   SpO2 97%   BMI 24.27 kg/m    Body mass index is 24.27 kg/m .  Physical Exam   Constitutional: Sitting in a wheelchair, quiet allows her daughter to do the communication but responds to questioning.  Appears stable at her baseline chronic poor health, well-groomed, cooperative.  HENT:Right pinna pedunculated growth has been present for years without change. Facial symmetry improved.  Head: Normocephalic and atraumatic. Mouth/Throat: hydrated,  dental decay.  Eyes: Conjunctivae and EOM are normal.No scleral icterus.   Neck:  Neck supple.  No tracheal deviation present. No thyromegaly present.   Cardiovascular: Regular rhythm, normal heart sounds, No murmur present.   Pulmonary/Chest: Effort normal and breath sounds normal. No respiratory distress.   Musculoskeletal: Deconditioned upper and lower extremities.   No edema and no tenderness.  Neurological: Cooperative.  Signs of vascular dementia. Able to move bilateral upper and lower extremities   Psychiatric: Cooperative, communicative through nods and a few words        Signed Electronically by: Bony Castaneda MD

## 2024-07-08 NOTE — Clinical Note
I printed orders for incontinence supplies will fax to you today to assist with her supplies. Bony Castaneda MD

## 2024-07-15 DIAGNOSIS — E78.5 HYPERLIPIDEMIA LDL GOAL <70: ICD-10-CM

## 2024-07-16 ENCOUNTER — DOCUMENTATION ONLY (OUTPATIENT)
Dept: FAMILY MEDICINE | Facility: CLINIC | Age: 64
End: 2024-07-16
Payer: COMMERCIAL

## 2024-07-16 NOTE — PROGRESS NOTES
Type of Form Received: Home Health Care Order [id number]    Form Received (Date) 7/15/24   Form Filled out Yes, faxed 7/19   Placed in provider folder Yes

## 2024-07-17 ENCOUNTER — MYC REFILL (OUTPATIENT)
Dept: FAMILY MEDICINE | Facility: CLINIC | Age: 64
End: 2024-07-17
Payer: COMMERCIAL

## 2024-07-17 ENCOUNTER — MEDICAL CORRESPONDENCE (OUTPATIENT)
Dept: HEALTH INFORMATION MANAGEMENT | Facility: CLINIC | Age: 64
End: 2024-07-17
Payer: COMMERCIAL

## 2024-07-17 DIAGNOSIS — E10.59 TYPE 1 DIABETES MELLITUS WITH OTHER CIRCULATORY COMPLICATION (H): Primary | ICD-10-CM

## 2024-07-17 RX ORDER — ATORVASTATIN CALCIUM 40 MG/1
40 TABLET, FILM COATED ORAL DAILY
Qty: 90 TABLET | Refills: 3 | Status: SHIPPED | OUTPATIENT
Start: 2024-07-17 | End: 2024-10-07

## 2024-07-17 NOTE — TELEPHONE ENCOUNTER
LVD:  7/8/2024  [facility] Bony Diaz MD  Family Medicine     LDL Cholesterol Calculated   Date Value Ref Range Status   06/16/2024 67 <=100 mg/dL Final       Refilled per protocol.    
stated

## 2024-07-19 RX ORDER — INSULIN ASPART 100 [IU]/ML
INJECTION, SOLUTION INTRAVENOUS; SUBCUTANEOUS
Qty: 15 ML | Refills: 4 | Status: SHIPPED | OUTPATIENT
Start: 2024-07-19 | End: 2024-09-03

## 2024-07-19 NOTE — TELEPHONE ENCOUNTER
July 19, 2024   Refill of Novolog as ordered at recent hospitalization.      insulin aspart (NOVOLOG FLEXPEN) 100 UNIT/ML pen15 mL47/19/2024--NoSig: Inject 2-4 units with meals plus correction scale 1 unit per 100 >200 three times daily before meals and at bedtime. For Pre-Meal  - 164 give 1 unit.  For Pre-Meal  - 189 give 2 units.  For Pre-Meal  - 214 give 3 units.  For Pre-Meal  - 239 give 4 units.  For Pre-Meal  - 264 give 5 units.  For Pre-Meal  - 289 give 6 units.  For Pre-Meal  - 314 give 7 units.  For Pre-Meal  - 339 give 8 units.  For Pre-Meal  - 364 give 9 units.  For Pre-Meal BG greater than or equal to 365 give 10 units. FOR BEDTIME:  For  - 224 give 1 unit.  For  - 249 give 2 units.  For  - 274 give 3 units.  For  - 299 give 4 units.  For  - 324 give 5 units.  For  - 349 give 6 units.  For BG greater than or equal to 350 give 7 units.Sent to pharmacy as: NovoLOG FlexPen 100 UNIT/ML Subcutaneous Solution Pen-injector (insulin aspart)Class: E-PrescribeOrder: [id number] Status: Receipt confirmed by pharmacy (7/19/2024  9:03 AM CDT)     Bony Castaneda MD

## 2024-07-29 ENCOUNTER — DOCUMENTATION ONLY (OUTPATIENT)
Dept: FAMILY MEDICINE | Facility: CLINIC | Age: 64
End: 2024-07-29
Payer: COMMERCIAL

## 2024-07-29 NOTE — PROGRESS NOTES
Type of Form Received: Home Health Care POC Order [id number]    Form Received (Date) 7/29/24   Form Filled out Yes, faxed 8/22   Placed in provider folder Yes

## 2024-08-01 ENCOUNTER — DOCUMENTATION ONLY (OUTPATIENT)
Dept: FAMILY MEDICINE | Facility: CLINIC | Age: 64
End: 2024-08-01
Payer: COMMERCIAL

## 2024-08-01 NOTE — PROGRESS NOTES
Type of Form Received: Home Health Care Order [id number]    Form Received (Date) 8/1/24   Form Filled out Yes, faxed 8/22   Placed in provider folder Yes

## 2024-08-12 ENCOUNTER — TELEPHONE (OUTPATIENT)
Dept: OPHTHALMOLOGY | Facility: CLINIC | Age: 64
End: 2024-08-12
Payer: COMMERCIAL

## 2024-08-21 ENCOUNTER — MEDICAL CORRESPONDENCE (OUTPATIENT)
Dept: HEALTH INFORMATION MANAGEMENT | Facility: CLINIC | Age: 64
End: 2024-08-21
Payer: COMMERCIAL

## 2024-08-21 DIAGNOSIS — Z53.9 DIAGNOSIS NOT YET DEFINED: Primary | ICD-10-CM

## 2024-08-22 ENCOUNTER — TELEPHONE (OUTPATIENT)
Dept: FAMILY MEDICINE | Facility: CLINIC | Age: 64
End: 2024-08-22
Payer: COMMERCIAL

## 2024-08-22 NOTE — TELEPHONE ENCOUNTER
[facility] Call Center    Phone Message    May a detailed message be left on voicemail: yes     Reason for Call: Other: The patient daughter was calling to get a UTI Test ordered due to the patient recent UTI symptoms, I did encourage a follow up appointment maybe needed prior to labs being ordered, we got her scheduled for 8/27/24 to be seen, please review and follow up to address next steps if needed or to address questions and concerns about getting UTI labs ordered prior to Doctors visit thank you.      Action Taken: Message routed to:  [facility]    Travel Screening: Not Applicable     Date of Service:

## 2024-08-22 NOTE — TELEPHONE ENCOUNTER
"The RN called and spoke with the patient, who gave the RN verbal consent to communicate with the patient's daughter, Tamie.    Per Tamie, over the past week or so the patient has had intermittent bowel incontinence, which the patient's daughter describes as the patient had \"a little bit of brown in her diaper when I woke her up in the morning.\" Per Tamie the patient had 1 episode of bowel incontinence on Tuesday evening and 1 episode of bowel incontinence last Saturday night/Sunday AM. The patient's daughter [place] [facility] due to concern that the patient might have a UTI given the recent bowel/fecal incontinence.     Per Tamie, the patient has a history of speech slurring/difficulties and dementia S/P CVA in the past. Over the past 3 days the patient has been slower to answer Tamie with speech, holding food in her mouth and not eating as much. With these recent symptoms/illness, Tamie also expressed concern that the patient seems to be having a harder time with comprehension than the patient's baseline (which Tamie describes as the patient seeming more \"out of it\"). In addition, Tamie expressed concern that the patient seemed more \"lethargic\" and \"drowsy\", but Tamie clarified that she has still been able to wake the patient up.     Tamie also stated that she's also noticed that the patient's urine has appeared more cloudy over the past few days. Additionally, Tamie has noticed that the patient has been wincing and reporting \"sharp\" pains \"in her tummy\" when the patient has been urinating.     Tamie expressed concern that despite the fact that the patient hasn't been eating as much, the patient's blood sugars have still been high. Tamie stated the patient's blood sugar was 360 in he morning after she didn't eat much yesterday. Tamie said she has been dosing the patient's insulin based on how much the patient normally eats, but that the patient has only been eating 1/4 to 1/2 of how much the patient would eat normally. Per Tamie, the patient's " blood sugars have been 100-250 throughout the day.     The patient's daughter denies the patient to have had facial droop, focal extremity weakness, new/different/worsened slurred speech from the patient's baseline speech deficits, fruity or otherwise malodorous urine, nausea, vomiting, fevers or chills.     The RN offered to schedule the patient for an appointment at 4 PM on 8/23/2024 with Dr. Meehan for further evaluation and treatment, which the patient's daughter stated that she was unable to make because the daughter has to work in the afternoon. The patient's daughter stated that she had spoken with Dr. Castaneda previously about calling the [facility] to ask about a UA. The patient's daughter expressed concern for the possibility for a UTI given that the patient's recent symptoms seem consistent with when the patient has had UTIs in the past per the patient's daughter.

## 2024-08-23 ENCOUNTER — HOSPITAL ENCOUNTER (INPATIENT)
Facility: CLINIC | Age: 64
LOS: 5 days | Discharge: HOME | DRG: 690 | End: 2024-08-28
Attending: EMERGENCY MEDICINE | Admitting: STUDENT IN AN ORGANIZED HEALTH CARE EDUCATION/TRAINING PROGRAM
Payer: COMMERCIAL

## 2024-08-23 ENCOUNTER — APPOINTMENT (OUTPATIENT)
Dept: CT IMAGING | Facility: CLINIC | Age: 64
DRG: 690 | End: 2024-08-23
Attending: EMERGENCY MEDICINE
Payer: COMMERCIAL

## 2024-08-23 ENCOUNTER — TELEPHONE (OUTPATIENT)
Dept: FAMILY MEDICINE | Facility: CLINIC | Age: 64
End: 2024-08-23
Payer: COMMERCIAL

## 2024-08-23 ENCOUNTER — APPOINTMENT (OUTPATIENT)
Dept: GENERAL RADIOLOGY | Facility: CLINIC | Age: 64
DRG: 690 | End: 2024-08-23
Attending: EMERGENCY MEDICINE
Payer: COMMERCIAL

## 2024-08-23 DIAGNOSIS — Z86.73 HISTORY OF CVA (CEREBROVASCULAR ACCIDENT): ICD-10-CM

## 2024-08-23 DIAGNOSIS — Z86.39 H/O INSULIN DEPENDENT DIABETES MELLITUS: ICD-10-CM

## 2024-08-23 DIAGNOSIS — N18.9 CHRONIC KIDNEY DISEASE, UNSPECIFIED CKD STAGE: ICD-10-CM

## 2024-08-23 DIAGNOSIS — R53.83 LETHARGY: ICD-10-CM

## 2024-08-23 DIAGNOSIS — Z11.52 ENCOUNTER FOR SCREENING FOR COVID-19: Primary | ICD-10-CM

## 2024-08-23 DIAGNOSIS — Z86.39 HISTORY OF DIABETES WITH KETOACIDOSIS: ICD-10-CM

## 2024-08-23 DIAGNOSIS — N39.0 URINARY TRACT INFECTION IN FEMALE: ICD-10-CM

## 2024-08-23 DIAGNOSIS — N30.00 ACUTE CYSTITIS WITHOUT HEMATURIA: ICD-10-CM

## 2024-08-23 LAB
ALBUMIN SERPL BCG-MCNC: 4.2 G/DL (ref 3.5–5.2)
ALBUMIN UR-MCNC: 100 MG/DL
ALP SERPL-CCNC: 177 U/L (ref 40–150)
ALT SERPL W P-5'-P-CCNC: 9 U/L (ref 0–50)
ANION GAP SERPL CALCULATED.3IONS-SCNC: 13 MMOL/L (ref 7–15)
APPEARANCE UR: ABNORMAL
AST SERPL W P-5'-P-CCNC: 19 U/L (ref 0–45)
B-OH-BUTYR SERPL-SCNC: 0.24 MMOL/L
BASOPHILS # BLD AUTO: 0 10E3/UL (ref 0–0.2)
BASOPHILS NFR BLD AUTO: 0 %
BILIRUB SERPL-MCNC: 0.3 MG/DL
BILIRUB UR QL STRIP: NEGATIVE
BUN SERPL-MCNC: 46.2 MG/DL (ref 8–23)
CALCIUM SERPL-MCNC: 9.8 MG/DL (ref 8.8–10.4)
CHLORIDE SERPL-SCNC: 108 MMOL/L (ref 98–107)
COLOR UR AUTO: YELLOW
CREAT SERPL-MCNC: 1.87 MG/DL (ref 0.51–0.95)
EGFRCR SERPLBLD CKD-EPI 2021: 29 ML/MIN/1.73M2
EOSINOPHIL # BLD AUTO: 0 10E3/UL (ref 0–0.7)
EOSINOPHIL NFR BLD AUTO: 0 %
ERYTHROCYTE [DISTWIDTH] IN BLOOD BY AUTOMATED COUNT: 15.2 % (ref 10–15)
FLUAV RNA SPEC QL NAA+PROBE: NEGATIVE
FLUBV RNA RESP QL NAA+PROBE: NEGATIVE
GLUCOSE BLDC GLUCOMTR-MCNC: 334 MG/DL (ref 70–99)
GLUCOSE SERPL-MCNC: 397 MG/DL (ref 70–99)
GLUCOSE UR STRIP-MCNC: >=1000 MG/DL
HCO3 SERPL-SCNC: 23 MMOL/L (ref 22–29)
HCT VFR BLD AUTO: 43.5 % (ref 35–47)
HGB BLD-MCNC: 13.1 G/DL (ref 11.7–15.7)
HGB UR QL STRIP: ABNORMAL
IMM GRANULOCYTES # BLD: 0 10E3/UL
IMM GRANULOCYTES NFR BLD: 0 %
KETONES UR STRIP-MCNC: ABNORMAL MG/DL
LEUKOCYTE ESTERASE UR QL STRIP: ABNORMAL
LYMPHOCYTES # BLD AUTO: 1.9 10E3/UL (ref 0.8–5.3)
LYMPHOCYTES NFR BLD AUTO: 28 %
MCH RBC QN AUTO: 27.7 PG (ref 26.5–33)
MCHC RBC AUTO-ENTMCNC: 30.1 G/DL (ref 31.5–36.5)
MCV RBC AUTO: 92 FL (ref 78–100)
MONOCYTES # BLD AUTO: 0.3 10E3/UL (ref 0–1.3)
MONOCYTES NFR BLD AUTO: 5 %
MUCOUS THREADS #/AREA URNS LPF: PRESENT /LPF
NEUTROPHILS # BLD AUTO: 4.6 10E3/UL (ref 1.6–8.3)
NEUTROPHILS NFR BLD AUTO: 67 %
NITRATE UR QL: NEGATIVE
NRBC # BLD AUTO: 0 10E3/UL
NRBC BLD AUTO-RTO: 0 /100
PH UR STRIP: 5.5 [PH] (ref 5–7)
PLATELET # BLD AUTO: 247 10E3/UL (ref 150–450)
POTASSIUM SERPL-SCNC: 4.5 MMOL/L (ref 3.4–5.3)
PROT SERPL-MCNC: 7.6 G/DL (ref 6.4–8.3)
RBC # BLD AUTO: 4.73 10E6/UL (ref 3.8–5.2)
RBC URINE: 7 /HPF
RSV RNA SPEC NAA+PROBE: NEGATIVE
SARS-COV-2 RNA RESP QL NAA+PROBE: NEGATIVE
SODIUM SERPL-SCNC: 144 MMOL/L (ref 135–145)
SP GR UR STRIP: 1.02 (ref 1–1.03)
SQUAMOUS EPITHELIAL: 1 /HPF
UROBILINOGEN UR STRIP-MCNC: NORMAL MG/DL
WBC # BLD AUTO: 6.9 10E3/UL (ref 4–11)
WBC CLUMPS #/AREA URNS HPF: PRESENT /HPF
WBC URINE: 118 /HPF
YEAST #/AREA URNS HPF: ABNORMAL /HPF

## 2024-08-23 PROCEDURE — 120N000002 HC R&B MED SURG/OB UMMC

## 2024-08-23 PROCEDURE — 87106 FUNGI IDENTIFICATION YEAST: CPT | Performed by: EMERGENCY MEDICINE

## 2024-08-23 PROCEDURE — 99285 EMERGENCY DEPT VISIT HI MDM: CPT | Mod: 25 | Performed by: EMERGENCY MEDICINE

## 2024-08-23 PROCEDURE — 80053 COMPREHEN METABOLIC PANEL: CPT | Performed by: EMERGENCY MEDICINE

## 2024-08-23 PROCEDURE — 99285 EMERGENCY DEPT VISIT HI MDM: CPT | Performed by: EMERGENCY MEDICINE

## 2024-08-23 PROCEDURE — 81003 URINALYSIS AUTO W/O SCOPE: CPT | Performed by: EMERGENCY MEDICINE

## 2024-08-23 PROCEDURE — 250N000011 HC RX IP 250 OP 636: Performed by: EMERGENCY MEDICINE

## 2024-08-23 PROCEDURE — 82010 KETONE BODYS QUAN: CPT | Performed by: EMERGENCY MEDICINE

## 2024-08-23 PROCEDURE — 93005 ELECTROCARDIOGRAM TRACING: CPT

## 2024-08-23 PROCEDURE — 70450 CT HEAD/BRAIN W/O DYE: CPT | Mod: 26 | Performed by: RADIOLOGY

## 2024-08-23 PROCEDURE — 85041 AUTOMATED RBC COUNT: CPT | Performed by: EMERGENCY MEDICINE

## 2024-08-23 PROCEDURE — 71046 X-RAY EXAM CHEST 2 VIEWS: CPT

## 2024-08-23 PROCEDURE — 87086 URINE CULTURE/COLONY COUNT: CPT | Performed by: EMERGENCY MEDICINE

## 2024-08-23 PROCEDURE — 71046 X-RAY EXAM CHEST 2 VIEWS: CPT | Mod: 26 | Performed by: RADIOLOGY

## 2024-08-23 PROCEDURE — 70450 CT HEAD/BRAIN W/O DYE: CPT

## 2024-08-23 PROCEDURE — 82962 GLUCOSE BLOOD TEST: CPT

## 2024-08-23 PROCEDURE — 87637 SARSCOV2&INF A&B&RSV AMP PRB: CPT | Performed by: EMERGENCY MEDICINE

## 2024-08-23 PROCEDURE — 36415 COLL VENOUS BLD VENIPUNCTURE: CPT | Performed by: EMERGENCY MEDICINE

## 2024-08-23 PROCEDURE — 96365 THER/PROPH/DIAG IV INF INIT: CPT | Performed by: EMERGENCY MEDICINE

## 2024-08-23 PROCEDURE — 250N000012 HC RX MED GY IP 250 OP 636 PS 637: Performed by: EMERGENCY MEDICINE

## 2024-08-23 RX ORDER — CEFTRIAXONE 1 G/1
1 INJECTION, POWDER, FOR SOLUTION INTRAMUSCULAR; INTRAVENOUS ONCE
Status: COMPLETED | OUTPATIENT
Start: 2024-08-23 | End: 2024-08-24

## 2024-08-23 RX ADMIN — INSULIN GLARGINE 18 UNITS: 100 INJECTION, SOLUTION SUBCUTANEOUS at 23:37

## 2024-08-23 RX ADMIN — CEFTRIAXONE SODIUM 1 G: 1 INJECTION, POWDER, FOR SOLUTION INTRAMUSCULAR; INTRAVENOUS at 23:37

## 2024-08-23 ASSESSMENT — ACTIVITIES OF DAILY LIVING (ADL)
ADLS_ACUITY_SCORE: 36
ADLS_ACUITY_SCORE: 38

## 2024-08-23 NOTE — TELEPHONE ENCOUNTER
The RN called and spoke with the patient's pharmacy. Pharmacy staff verified that there were refills remaining of the prescription for insulin aspart (NOVOLOG FLEXPEN) 100 UNIT/ML pen and that the patient needs to call the pharmacy (phone number: [phone]) to schedule a delivery time/date.

## 2024-08-23 NOTE — TELEPHONE ENCOUNTER
LVM offering sooner PCC appointment for UTI 8/23/24 830am w Dr. Medina. If unable to attend this can keep 8/27 previously scheduled visit.

## 2024-08-24 LAB
ALBUMIN SERPL BCG-MCNC: 3.6 G/DL (ref 3.5–5.2)
ALP SERPL-CCNC: 145 U/L (ref 40–150)
ALT SERPL W P-5'-P-CCNC: 7 U/L (ref 0–50)
ANION GAP SERPL CALCULATED.3IONS-SCNC: 11 MMOL/L (ref 7–15)
AST SERPL W P-5'-P-CCNC: 16 U/L (ref 0–45)
BILIRUB SERPL-MCNC: 0.2 MG/DL
BUN SERPL-MCNC: 39.8 MG/DL (ref 8–23)
CALCIUM SERPL-MCNC: 8.9 MG/DL (ref 8.8–10.4)
CHLORIDE SERPL-SCNC: 106 MMOL/L (ref 98–107)
CREAT SERPL-MCNC: 1.51 MG/DL (ref 0.51–0.95)
EGFRCR SERPLBLD CKD-EPI 2021: 38 ML/MIN/1.73M2
ERYTHROCYTE [DISTWIDTH] IN BLOOD BY AUTOMATED COUNT: 14.8 % (ref 10–15)
GLUCOSE BLDC GLUCOMTR-MCNC: 131 MG/DL (ref 70–99)
GLUCOSE BLDC GLUCOMTR-MCNC: 170 MG/DL (ref 70–99)
GLUCOSE BLDC GLUCOMTR-MCNC: 215 MG/DL (ref 70–99)
GLUCOSE BLDC GLUCOMTR-MCNC: 265 MG/DL (ref 70–99)
GLUCOSE BLDC GLUCOMTR-MCNC: 356 MG/DL (ref 70–99)
GLUCOSE BLDC GLUCOMTR-MCNC: 374 MG/DL (ref 70–99)
GLUCOSE SERPL-MCNC: 209 MG/DL (ref 70–99)
HCO3 SERPL-SCNC: 23 MMOL/L (ref 22–29)
HCT VFR BLD AUTO: 37.2 % (ref 35–47)
HGB BLD-MCNC: 11.6 G/DL (ref 11.7–15.7)
MCH RBC QN AUTO: 27.8 PG (ref 26.5–33)
MCHC RBC AUTO-ENTMCNC: 31.2 G/DL (ref 31.5–36.5)
MCV RBC AUTO: 89 FL (ref 78–100)
PLATELET # BLD AUTO: 184 10E3/UL (ref 150–450)
POTASSIUM SERPL-SCNC: 3.4 MMOL/L (ref 3.4–5.3)
PROT SERPL-MCNC: 6.3 G/DL (ref 6.4–8.3)
RBC # BLD AUTO: 4.17 10E6/UL (ref 3.8–5.2)
SODIUM SERPL-SCNC: 140 MMOL/L (ref 135–145)
WBC # BLD AUTO: 6 10E3/UL (ref 4–11)

## 2024-08-24 PROCEDURE — 99207 PR APP CREDIT; MD BILLING SHARED VISIT: CPT | Mod: FS | Performed by: NURSE PRACTITIONER

## 2024-08-24 PROCEDURE — 36415 COLL VENOUS BLD VENIPUNCTURE: CPT | Performed by: NURSE PRACTITIONER

## 2024-08-24 PROCEDURE — 85027 COMPLETE CBC AUTOMATED: CPT | Performed by: NURSE PRACTITIONER

## 2024-08-24 PROCEDURE — 250N000012 HC RX MED GY IP 250 OP 636 PS 637: Performed by: NURSE PRACTITIONER

## 2024-08-24 PROCEDURE — 99222 1ST HOSP IP/OBS MODERATE 55: CPT | Mod: AI | Performed by: STUDENT IN AN ORGANIZED HEALTH CARE EDUCATION/TRAINING PROGRAM

## 2024-08-24 PROCEDURE — 82962 GLUCOSE BLOOD TEST: CPT

## 2024-08-24 PROCEDURE — 250N000013 HC RX MED GY IP 250 OP 250 PS 637: Performed by: NURSE PRACTITIONER

## 2024-08-24 PROCEDURE — 120N000002 HC R&B MED SURG/OB UMMC

## 2024-08-24 PROCEDURE — 250N000011 HC RX IP 250 OP 636: Performed by: NURSE PRACTITIONER

## 2024-08-24 PROCEDURE — 80053 COMPREHEN METABOLIC PANEL: CPT | Performed by: NURSE PRACTITIONER

## 2024-08-24 RX ORDER — INSULIN GLARGINE 100 [IU]/ML
INJECTION, SOLUTION SUBCUTANEOUS
Status: ON HOLD | COMMUNITY
Start: 2024-07-17 | End: 2024-08-28

## 2024-08-24 RX ORDER — ONDANSETRON 4 MG/1
4 TABLET, ORALLY DISINTEGRATING ORAL EVERY 6 HOURS PRN
Status: DISCONTINUED | OUTPATIENT
Start: 2024-08-24 | End: 2024-08-28 | Stop reason: HOSPADM

## 2024-08-24 RX ORDER — CALCIUM CARBONATE 500 MG/1
1000 TABLET, CHEWABLE ORAL 4 TIMES DAILY PRN
Status: DISCONTINUED | OUTPATIENT
Start: 2024-08-24 | End: 2024-08-28 | Stop reason: HOSPADM

## 2024-08-24 RX ORDER — LEVOTHYROXINE SODIUM 25 UG/1
75 TABLET ORAL DAILY
Status: DISCONTINUED | OUTPATIENT
Start: 2024-08-25 | End: 2024-08-28 | Stop reason: HOSPADM

## 2024-08-24 RX ORDER — LEVETIRACETAM 500 MG/1
500 TABLET ORAL 2 TIMES DAILY
Status: DISCONTINUED | OUTPATIENT
Start: 2024-08-24 | End: 2024-08-28 | Stop reason: HOSPADM

## 2024-08-24 RX ORDER — DEXTROSE MONOHYDRATE 25 G/50ML
25-50 INJECTION, SOLUTION INTRAVENOUS
Status: DISCONTINUED | OUTPATIENT
Start: 2024-08-24 | End: 2024-08-28 | Stop reason: HOSPADM

## 2024-08-24 RX ORDER — CLOPIDOGREL BISULFATE 75 MG/1
75 TABLET ORAL DAILY
Status: DISCONTINUED | OUTPATIENT
Start: 2024-08-24 | End: 2024-08-28 | Stop reason: HOSPADM

## 2024-08-24 RX ORDER — LIDOCAINE 40 MG/G
CREAM TOPICAL
Status: DISCONTINUED | OUTPATIENT
Start: 2024-08-24 | End: 2024-08-28 | Stop reason: HOSPADM

## 2024-08-24 RX ORDER — AMLODIPINE BESYLATE 5 MG/1
5 TABLET ORAL DAILY
Status: DISCONTINUED | OUTPATIENT
Start: 2024-08-24 | End: 2024-08-28 | Stop reason: HOSPADM

## 2024-08-24 RX ORDER — ONDANSETRON 2 MG/ML
4 INJECTION INTRAMUSCULAR; INTRAVENOUS EVERY 6 HOURS PRN
Status: DISCONTINUED | OUTPATIENT
Start: 2024-08-24 | End: 2024-08-28 | Stop reason: HOSPADM

## 2024-08-24 RX ORDER — CEFTRIAXONE 1 G/1
1 INJECTION, POWDER, FOR SOLUTION INTRAMUSCULAR; INTRAVENOUS AT BEDTIME
Status: DISCONTINUED | OUTPATIENT
Start: 2024-08-24 | End: 2024-08-28 | Stop reason: HOSPADM

## 2024-08-24 RX ORDER — NICOTINE POLACRILEX 4 MG
15-30 LOZENGE BUCCAL
Status: DISCONTINUED | OUTPATIENT
Start: 2024-08-24 | End: 2024-08-28 | Stop reason: HOSPADM

## 2024-08-24 RX ORDER — ATORVASTATIN CALCIUM 40 MG/1
40 TABLET, FILM COATED ORAL DAILY
Status: DISCONTINUED | OUTPATIENT
Start: 2024-08-24 | End: 2024-08-28 | Stop reason: HOSPADM

## 2024-08-24 RX ORDER — ACETAMINOPHEN 325 MG/1
650 TABLET ORAL EVERY 4 HOURS PRN
Status: DISCONTINUED | OUTPATIENT
Start: 2024-08-24 | End: 2024-08-28 | Stop reason: HOSPADM

## 2024-08-24 RX ORDER — ASPIRIN 81 MG/1
81 TABLET, CHEWABLE ORAL DAILY
Status: DISCONTINUED | OUTPATIENT
Start: 2024-08-24 | End: 2024-08-28 | Stop reason: HOSPADM

## 2024-08-24 RX ORDER — AMOXICILLIN 250 MG
2 CAPSULE ORAL 2 TIMES DAILY PRN
Status: DISCONTINUED | OUTPATIENT
Start: 2024-08-24 | End: 2024-08-28 | Stop reason: HOSPADM

## 2024-08-24 RX ORDER — AMOXICILLIN 250 MG
1 CAPSULE ORAL 2 TIMES DAILY PRN
Status: DISCONTINUED | OUTPATIENT
Start: 2024-08-24 | End: 2024-08-28 | Stop reason: HOSPADM

## 2024-08-24 RX ORDER — DULOXETIN HYDROCHLORIDE 20 MG/1
20 CAPSULE, DELAYED RELEASE ORAL DAILY
Status: DISCONTINUED | OUTPATIENT
Start: 2024-08-24 | End: 2024-08-28 | Stop reason: HOSPADM

## 2024-08-24 RX ORDER — ACETAMINOPHEN 650 MG/1
650 SUPPOSITORY RECTAL EVERY 4 HOURS PRN
Status: DISCONTINUED | OUTPATIENT
Start: 2024-08-24 | End: 2024-08-28 | Stop reason: HOSPADM

## 2024-08-24 RX ORDER — LEVOTHYROXINE SODIUM 75 UG/1
75 TABLET ORAL DAILY
Status: DISCONTINUED | OUTPATIENT
Start: 2024-08-24 | End: 2024-08-24

## 2024-08-24 RX ORDER — GABAPENTIN 100 MG/1
100 CAPSULE ORAL AT BEDTIME
Status: DISCONTINUED | OUTPATIENT
Start: 2024-08-25 | End: 2024-08-28 | Stop reason: HOSPADM

## 2024-08-24 RX ORDER — CARVEDILOL 12.5 MG/1
12.5 TABLET ORAL 2 TIMES DAILY WITH MEALS
Status: DISCONTINUED | OUTPATIENT
Start: 2024-08-24 | End: 2024-08-28 | Stop reason: HOSPADM

## 2024-08-24 RX ADMIN — INSULIN GLARGINE 18 UNITS: 100 INJECTION, SOLUTION SUBCUTANEOUS at 22:07

## 2024-08-24 RX ADMIN — LEVETIRACETAM 500 MG: 500 TABLET, FILM COATED ORAL at 08:28

## 2024-08-24 RX ADMIN — INSULIN ASPART 1 UNITS: 100 INJECTION, SOLUTION INTRAVENOUS; SUBCUTANEOUS at 12:42

## 2024-08-24 RX ADMIN — LEVOTHYROXINE SODIUM 75 MCG: 75 TABLET ORAL at 08:28

## 2024-08-24 RX ADMIN — CLOPIDOGREL BISULFATE 75 MG: 75 TABLET ORAL at 08:25

## 2024-08-24 RX ADMIN — CARVEDILOL 12.5 MG: 12.5 TABLET, FILM COATED ORAL at 08:25

## 2024-08-24 RX ADMIN — CEFTRIAXONE SODIUM 1 G: 1 INJECTION, POWDER, FOR SOLUTION INTRAMUSCULAR; INTRAVENOUS at 22:07

## 2024-08-24 RX ADMIN — ACETAMINOPHEN 650 MG: 325 TABLET, FILM COATED ORAL at 22:15

## 2024-08-24 RX ADMIN — CARVEDILOL 12.5 MG: 12.5 TABLET, FILM COATED ORAL at 18:40

## 2024-08-24 RX ADMIN — ATORVASTATIN CALCIUM 40 MG: 40 TABLET, FILM COATED ORAL at 08:28

## 2024-08-24 RX ADMIN — ASPIRIN 81 MG CHEWABLE TABLET 81 MG: 81 TABLET CHEWABLE at 08:31

## 2024-08-24 RX ADMIN — LEVETIRACETAM 500 MG: 500 TABLET, FILM COATED ORAL at 20:43

## 2024-08-24 RX ADMIN — INSULIN ASPART 5 UNITS: 100 INJECTION, SOLUTION INTRAVENOUS; SUBCUTANEOUS at 18:29

## 2024-08-24 RX ADMIN — DULOXETINE HYDROCHLORIDE 20 MG: 20 CAPSULE, DELAYED RELEASE ORAL at 08:25

## 2024-08-24 RX ADMIN — AMLODIPINE BESYLATE 5 MG: 5 TABLET ORAL at 08:28

## 2024-08-24 ASSESSMENT — ACTIVITIES OF DAILY LIVING (ADL)
ADLS_ACUITY_SCORE: 38
ADLS_ACUITY_SCORE: 43
ADLS_ACUITY_SCORE: 54
ADLS_ACUITY_SCORE: 52
ADLS_ACUITY_SCORE: 38
ADLS_ACUITY_SCORE: 38
ADLS_ACUITY_SCORE: 52
ADLS_ACUITY_SCORE: 38
ADLS_ACUITY_SCORE: 54
ADLS_ACUITY_SCORE: 38
ADLS_ACUITY_SCORE: 54
ADLS_ACUITY_SCORE: 43
ADLS_ACUITY_SCORE: 52
ADLS_ACUITY_SCORE: 54
ADLS_ACUITY_SCORE: 38
ADLS_ACUITY_SCORE: 38
ADLS_ACUITY_SCORE: 54
ADLS_ACUITY_SCORE: 38
ADLS_ACUITY_SCORE: 43
ADLS_ACUITY_SCORE: 54
ADLS_ACUITY_SCORE: 38

## 2024-08-24 NOTE — H&P
[facility]    History and Physical - Hospitalist Service, GOLD TEAM 8       Date of Admission:  2024    Assessment & Plan      Isabell Matthews is a 64 year old female admitted on 2024. She has a past medical history of dementia, DM I, multiple CVA, seizures, HTN, HLD, CKD, hypothyroidism,UTIs and depression. She presents to the ED with symptoms of UTI.      # UTI  Daughter reports increased confusion which is usually a symptoms of UTI. In the ED, WBC was 6.9, UA with large LE and WBC, negative nitrite. She was started on rocephin  - Continue rocephin  - Follow urine culture  - CBC    # DM I  # Polyneuropathy  Blood sugars elevated 350-390 on admit, ketones 0.24. She received 18 units lantus in ED  - PTA lantus  - Medium sliding scale  - Hypoglycemic protocol  - PTA gabapentin     # Dementia  # Multiple strokes: right basal ganglia ischemic infarct in , left basal ganglia hemorrhagic stroke , right centrum semiovale & right chuck , and  acute/subacute right insular and right frontal operculum infarct 24  # Seizures   She has baseline dysarthria and dysphagia. Uses a walker due to gait instability. Admitted 24-24 after coming in with left facial droop and right sided weakness and found to have a acute/subacute right insular and right frontal operculum infarct. She was started in asa and plavix . CT of head negative for acute changes tonight.  - aspirin 81 mg and clopidogrel 75 mg daily for 90 days followed by aspirin 81 mg daily indefinitely starting 24  - PTA Keppra  - Up with assist and walker  - Fall precaution    # EVON on CKD  Creatinine usually 1.5-1.68 and it was 1.87 on admit and GFR 29. Previous GFR was 42. Daughter noticed  Po intake. She has been drinking well in the ED  - Encourage PO intake  - BMP    # HTN  # HLD  Echo 24 shows normal LV function with an EF of 60-65%. Cardiac event monitors in the past due to  frequent CVA, none showing atrial fib/flutter  - PTA Coreg 12.5 mg twice daily and amlodipine 5 mg daily     # Hypothyroidism  - PTA synthroid    # Depression  Related to dementia and started on duloxetine  - PTA duloxitine       Diet: Pureed Diet (level 4) Thin Liquids (level 0)  DVT Prophylaxis: Ambulate every shift  Parker Catheter: Not present  Lines: None     Cardiac Monitoring: None  Code Status: Full Code        Disposition Plan     Medically Ready for Discharge: Anticipated in 2-4 Days         The patient's care was discussed with the Attending Physician, Dr. Ayala .    PEGGY Eid [facility]  Hospitalist Service, GOLD TEAM [address] [place] [address]  Securely message with adQ (more info)  Text page via [facility] Paging/Directory   See signed in provider for up to date coverage information    ______________________________________________________________________    Chief Complaint   Increased confusion    History is obtained from the patient    History of Present Illness   Isabell Matthews is a 64 year old female who has a past medical history of dementia, DM I, multiple CVA, seizures, HTN, HLD, CKD, hypothyroidism,UTIs and depression. Her daughter brought her in tonight after noticing increased confusion. She was having difficulty following commands, most notable with eating end was pocketing food and water and needed prompting to swallow. Also noted overall lethargy and family believed these have historically been symptoms of a UTI since her strokes. In the ED, she received antibiotics and drinking liquids well. He is alert and following commands, although confused at times per baseline..      Past Medical History    Past Medical History:   Diagnosis Date    Chronic pain     Coronary atherosclerosis 6/14/2016    Essential hypertension     Ex-cigarette smoker     quit 7/2018 over 35 years    Ex-cigarette smoker     Hyperlipidemia     Hypothyroid     Seizures (H)      Stroke (H)     Vascular dementia (H)        Past Surgical History   Past Surgical History:   Procedure Laterality Date    athroplasty hip Bilateral     2003, 2006    OTHER SURGICAL HISTORY      athroplasty hip    PICC DOUBLE LUMEN PLACEMENT Right 06/11/2023    right basilic 5 fr dl power picc 39 cm    STENT         Prior to Admission Medications   Prior to Admission Medications   Prescriptions Last Dose Informant Patient Reported? Taking?   DULoxetine (CYMBALTA) 20 MG capsule   No No   Sig: Take 1 capsule (20 mg) by mouth daily   Glucagon (GVOKE HYPOPEN) 1 MG/0.2ML pen   No No   Sig: Inject the contents of 1 device under the skin into lower abdomen, outer thigh, or outer upper arm as needed for hypoglycemia. If no response after 15 minutes, additional 1 mg dose from a new device may be injected while waiting for emergency assistance.   METHYLCELLULOSE, LAXATIVE, PO   Yes No   Sig: Take 1 Tablespoonful by mouth daily as needed   SYNTHROID 75 MCG tablet   No No   Sig: Take 1 tablet (75 mcg) by mouth daily   Vitamin D3 (CHOLECALCIFEROL) 125 MCG (5000 UT) tablet   No No   Sig: Take 1 tablet (125 mcg) by mouth daily   amLODIPine (NORVASC) 5 MG tablet   No No   Sig: Take 1 tablet (5 mg) by mouth daily   aspirin (ASA) 81 MG chewable tablet   No No   Sig: Take 1 tablet (81 mg) by mouth daily   atorvastatin (LIPITOR) 40 MG tablet   No No   Sig: TAKE 1 TABLET(40 MG) BY MOUTH DAILY   blood glucose (TRUE METRIX BLOOD GLUCOSE TEST) test strip   No No   Sig: USE TO TEST BLOOD SUGAR 4 TO 5 TIMES DAILY OR AS DIRECTED   blood glucose monitoring (NO BRAND SPECIFIED) meter device kit  Daughter No No   Sig: Use to test blood sugar 4 times daily.  Please provide glucose meter that is covered by insurance.   carvedilol (COREG) 12.5 MG tablet   No No   Sig: Take 1 tablet (12.5 mg) by mouth 2 times daily (with meals)   clopidogrel (PLAVIX) 75 MG tablet   No No   Sig: Take 1 tablet (75 mg) by mouth daily for 89 days   cyanocobalamin  (VITAMIN B-12) 1000 MCG tablet   No No   Sig: Take 1 tablet (1,000 mcg) by mouth daily   cyclobenzaprine (FLEXERIL) 10 MG tablet   No No   Sig: Take 1 tablet (10 mg) by mouth nightly as needed for muscle spasms   diclofenac (VOLTAREN) 1 % topical gel   No No   Sig: Apply 2 g topically 4 times daily as needed for moderate pain   gabapentin (NEURONTIN) 100 MG capsule   No No   Sig: Take 1 capsule (100 mg) by mouth at bedtime   insulin aspart (NOVOLOG FLEXPEN) 100 UNIT/ML pen   No No   Sig: Inject 2-4 units with meals plus correction scale 1 unit per 100 >200 three times daily before meals and at bedtime. For Pre-Meal  - 164 give 1 unit.  For Pre-Meal  - 189 give 2 units.  For Pre-Meal  - 214 give 3 units.  For Pre-Meal  - 239 give 4 units.  For Pre-Meal  - 264 give 5 units.  For Pre-Meal  - 289 give 6 units.  For Pre-Meal  - 314 give 7 units.  For Pre-Meal  - 339 give 8 units.  For Pre-Meal  - 364 give 9 units.  For Pre-Meal BG greater than or equal to 365 give 10 units. FOR BEDTIME:  For  - 224 give 1 unit.  For  - 249 give 2 units.  For  - 274 give 3 units.  For  - 299 give 4 units.  For  - 324 give 5 units.  For  - 349 give 6 units.  For BG greater than or equal to 350 give 7 units.   insulin aspart (NOVOLOG PENFILL) 100 UNIT/ML cartridge   No No   Sig: Inject 2-4 Units Subcutaneous 4 times daily (with meals and nightly) diabetes   insulin pen needle (31G X 8 MM) 31G X 8 MM miscellaneous   No No   Sig: Use 4 pen needles daily or as directed.   levETIRAcetam (KEPPRA) 500 MG tablet   No No   Sig: Take 1 tablet (500 mg) by mouth 2 times daily   loratadine (CLARITIN) 10 MG tablet   No No   Sig: Take 1 tablet (10 mg) by mouth daily   nystatin (MYCOSTATIN) 745731 UNIT/GM external cream   No No   Sig: Apply topically 2 times daily Until rash clear   polyethylene glycol (MIRALAX) 17 GM/Dose powder   No No   Sig: Take 17 g by mouth  daily as needed for constipation   psyllium (METAMUCIL/KONSYL) capsule   No No   Sig: Take 1 capsule by mouth daily With 4-8 ounce fluid daily   zinc oxide (DESITIN) 20 % external ointment   No No   Sig: Apply topically as needed for dry skin or irritation      Facility-Administered Medications: None        Review of Systems    The 10 point Review of Systems is negative other than noted in the HPI or here.      Physical Exam   Vital Signs: Temp: 97.8  F (36.6  C) Temp src: Temporal BP: (!) 184/86 Pulse: 82   Resp: 16 SpO2: 98 % O2 Device: None (Room air)    Weight: 0 lbs 0 oz    Physical Exam   Constitutional:   Well nourished, well developed, resting comfortably   Cardiovascular: Regular rate and rhythm without murmurs or gallops  Pulmonary/Chest: Clear to auscultation bilaterally, with no wheezes or retractions. No respiratory distress.  GI: Soft with good bowel sounds.  Non-tender, non-distended, with no guarding, no rebound, no peritoneal signs.   Musculoskeletal:  No edema or clubbing   Skin: Skin is warm and dry. No rash noted.   Neurological: Alert and oriented to person and place. Right upper extremity weakness. Noted when she is sleeping, left side has a mild facial droop. Resolves when she is awake.  Psychiatric:  Normal mood and affect.    Medical Decision Making     55 MINUTES SPENT BY ME on the date of service doing chart review, history, exam, documentation & further activities per the note.      Data     I have personally reviewed the following data over the past 24 hrs:    6.9  \   13.1   / 247     144 108 (H) 46.2 (H) /  265 (H)   4.5 23 1.87 (H) \     ALT: 9 AST: 19 AP: 177 (H) TBILI: 0.3   ALB: 4.2 TOT PROTEIN: 7.6 LIPASE: N/A       Imaging results reviewed over the past 24 hrs:   Recent Results (from the past 24 hour(s))   Chest XR,  PA & LAT    Narrative    EXAM: XR CHEST 2 VIEWS  LOCATION: [facility]  DATE: 8/23/2024    INDICATION: lethargy,  dementia, eval for infiltrate  COMPARISON: 9/6/2023.      Impression    IMPRESSION: Negative chest.   Head CT w/o contrast    Narrative    EXAM: CT HEAD W/O CONTRAST  LOCATION: [facility]  DATE: 8/23/2024    INDICATION: Lethargy, hx of CVA, eval for acute process.  COMPARISON: Brain MRI 6/16/2024.  TECHNIQUE: Routine CT Head without IV contrast. Multiplanar reformats. Dose reduction techniques were used.    FINDINGS:  INTRACRANIAL CONTENTS: No intracranial hemorrhage, extraaxial collection, or mass effect.  No CT evidence of acute infarct. Mild-to-moderate volume loss and presumed chronic small vessel ischemia. Evolutionary change of the now chronic right frontal   opercular infarct which was imaged in its acute phase on 6/16/2024. Chronic infarct is seen within the basal ganglia bilaterally.    VISUALIZED ORBITS/SINUSES/MASTOIDS: No intraorbital abnormality. No paranasal sinus mucosal disease. No middle ear or mastoid effusion.    BONES/SOFT TISSUES: No acute abnormality.      Impression    IMPRESSION:  1.  No acute intracranial abnormality.    2.  Interval evolutionary change of the now chronic right frontal opercular infarct which was imaged in its acute phase 6/16/2024.    3.  Age-related and chronic ischemic changes are otherwise stable.

## 2024-08-24 NOTE — MEDICATION SCRIBE - ADMISSION MEDICATION HISTORY
Medication Scribe Admission Medication History    Admission medication history is complete. The information provided in this note is only as accurate as the sources available at the time of the update.    Information Source(s): Family member and CareEverywhere/SureScripts via phone    Pertinent Information: Called patient's daughter Vishal who assisted with medication reconciliation over the phone. She stated patient needs a refill for her Glucagon 1 mg/0.2ml pen.    Changes made to PTA medication list:  Added: LANTUS SOLOSTAR 100 UNIT/ML soln  Deleted: psyllium (not taking).  Changed: None    Allergies reviewed with patient and updates made in EHR: yes    Medication History Completed By: Paula Castro 8/24/2024 3:07 PM    PTA Med List   Medication Sig Last Dose    amLODIPine (NORVASC) 5 MG tablet Take 1 tablet (5 mg) by mouth daily 8/22/2024 at PM    aspirin (ASA) 81 MG chewable tablet Take 1 tablet (81 mg) by mouth daily 8/23/2024 at AM    atorvastatin (LIPITOR) 40 MG tablet TAKE 1 TABLET(40 MG) BY MOUTH DAILY 8/22/2024 at PM    blood glucose (TRUE METRIX BLOOD GLUCOSE TEST) test strip USE TO TEST BLOOD SUGAR 4 TO 5 TIMES DAILY OR AS DIRECTED 8/23/2024 at AM    blood glucose monitoring (NO BRAND SPECIFIED) meter device kit Use to test blood sugar 4 times daily.  Please provide glucose meter that is covered by insurance. 8/23/2024 at AM    carvedilol (COREG) 12.5 MG tablet Take 1 tablet (12.5 mg) by mouth 2 times daily (with meals) 8/23/2024 at AM    clopidogrel (PLAVIX) 75 MG tablet Take 1 tablet (75 mg) by mouth daily for 89 days 8/23/2024 at AM    cyanocobalamin (VITAMIN B-12) 1000 MCG tablet Take 1 tablet (1,000 mcg) by mouth daily 8/23/2024 at AM    cyclobenzaprine (FLEXERIL) 10 MG tablet Take 1 tablet (10 mg) by mouth nightly as needed for muscle spasms Past Month    diclofenac (VOLTAREN) 1 % topical gel Apply 2 g topically 4 times daily as needed for moderate pain More than a month    DULoxetine  (CYMBALTA) 20 MG capsule Take 1 capsule (20 mg) by mouth daily 8/22/2024 at PM    gabapentin (NEURONTIN) 100 MG capsule Take 1 capsule (100 mg) by mouth at bedtime 8/22/2024 at PM    Glucagon (GVOKE HYPOPEN) 1 MG/0.2ML pen Inject the contents of 1 device under the skin into lower abdomen, outer thigh, or outer upper arm as needed for hypoglycemia. If no response after 15 minutes, additional 1 mg dose from a new device may be injected while waiting for emergency assistance. More than a month    insulin aspart (NOVOLOG FLEXPEN) 100 UNIT/ML pen Inject 2-4 units with meals plus correction scale 1 unit per 100 >200 three times daily before meals and at bedtime. For Pre-Meal  - 164 give 1 unit.  For Pre-Meal  - 189 give 2 units.  For Pre-Meal  - 214 give 3 units.  For Pre-Meal  - 239 give 4 units.  For Pre-Meal  - 264 give 5 units.  For Pre-Meal  - 289 give 6 units.  For Pre-Meal  - 314 give 7 units.  For Pre-Meal  - 339 give 8 units.  For Pre-Meal  - 364 give 9 units.  For Pre-Meal BG greater than or equal to 365 give 10 units. FOR BEDTIME:  For  - 224 give 1 unit.  For  - 249 give 2 units.  For  - 274 give 3 units.  For  - 299 give 4 units.  For  - 324 give 5 units.  For  - 349 give 6 units.  For BG greater than or equal to 350 give 7 units. 8/22/2024 at PM    insulin aspart (NOVOLOG PENFILL) 100 UNIT/ML cartridge Inject 2-4 Units Subcutaneous 4 times daily (with meals and nightly) diabetes 8/23/2024    insulin pen needle (31G X 8 MM) 31G X 8 MM miscellaneous Use 4 pen needles daily or as directed. 8/23/2024    LANTUS SOLOSTAR 100 UNIT/ML soln INJECT 18 UNITS SUBCUTANEOUSLY EVERY 24 HOURS 8/22/2024 at PM    levETIRAcetam (KEPPRA) 500 MG tablet Take 1 tablet (500 mg) by mouth 2 times daily 8/23/2024 at AM    loratadine (CLARITIN) 10 MG tablet Take 1 tablet (10 mg) by mouth daily 8/22/2024 at PM    METHYLCELLULOSE, LAXATIVE, PO Take  1 Tablespoonful by mouth daily as needed Past Month    nystatin (MYCOSTATIN) 091828 UNIT/GM external cream Apply topically 2 times daily Until rash clear 8/22/2024 at AM    polyethylene glycol (MIRALAX) 17 GM/Dose powder Take 17 g by mouth daily as needed for constipation Past Month    SYNTHROID 75 MCG tablet Take 1 tablet (75 mcg) by mouth daily 8/23/2024 at AM    Vitamin D3 (CHOLECALCIFEROL) 125 MCG (5000 UT) tablet Take 1 tablet (125 mcg) by mouth daily 8/23/2024 at AM    zinc oxide (DESITIN) 20 % external ointment Apply topically as needed for dry skin or irritation Unknown

## 2024-08-24 NOTE — ED PROVIDER NOTES
"    [facility] DE[facility]MENT ([facility])    8/23/24       History     Chief Complaint   Patient presents with    UTI Symptoms     HPI    Isabell Matthews is a 64 year old female who with PMH of diabetes mellitus, diabetic polyneuropathy, vascular vs Alzheimer dementia, multiple prior strokes (right basal ganglia ischemic infarct in 2018, left basal ganglia hemorrhagic stroke 2021, right centrum semiovale & right chuck 2023), history of seizures, hypertension, hyperlipidemia, chronic kidney disease, recurrent UTI, multiple hospitalizations due to DKA.     The patient's daughter brings her into the emergency department today due to concern that she can potentially have a UTI.  Patient's daughter reports that for the past few days she has been \"holding her food and water\" in her mouth.  She reports that she has been trying to feed her mother and there can be up to 5 minutes in between when she takes a bite of food and when she swallows it.  She needs to be cued to swallow.  Evidently she has done this in the past when she has urinary tract infections as this is one of her symptoms of recrudescence secondary to previous stroke.  Her oral intake has significantly decreased over the past few days.  She has been very lethargic and not as interactive as she typically is.  She has been sleeping much of the day.  No known fevers, no cough.  She does not appear to be short of breath.  She has not had any vomiting or diarrhea.  She evidently has been occasionally complaining of some lower abdominal pain particularly when sitting to use the toilet.  Patient's daughter reports that she is in an adult briefs and is always incontinent of urine, sometimes incontinent of stool.  The past few days she has had some stool incontinence which is one of the reasons why the patient's daughter is concerned for possible UTI.    She does ambulate with a walker for balance and in a wheelchair for long distances.      Past Medical " History  Past Medical History:   Diagnosis Date    Chronic pain     Coronary atherosclerosis 6/14/2016    Essential hypertension     Ex-cigarette smoker     quit 7/2018 over 35 years    Ex-cigarette smoker     Hyperlipidemia     Hypothyroid     Seizures (H)     Stroke (H)     Vascular dementia (H)      Past Surgical History:   Procedure Laterality Date    athroplasty hip Bilateral     2003, 2006    OTHER SURGICAL HISTORY      athroplasty hip    PICC DOUBLE LUMEN PLACEMENT Right 06/11/2023    right basilic 5 fr dl power picc 39 cm    STENT       amLODIPine (NORVASC) 5 MG tablet  aspirin (ASA) 81 MG chewable tablet  atorvastatin (LIPITOR) 40 MG tablet  blood glucose (TRUE METRIX BLOOD GLUCOSE TEST) test strip  blood glucose monitoring (NO BRAND SPECIFIED) meter device kit  carvedilol (COREG) 12.5 MG tablet  clopidogrel (PLAVIX) 75 MG tablet  cyanocobalamin (VITAMIN B-12) 1000 MCG tablet  cyclobenzaprine (FLEXERIL) 10 MG tablet  diclofenac (VOLTAREN) 1 % topical gel  DULoxetine (CYMBALTA) 20 MG capsule  gabapentin (NEURONTIN) 100 MG capsule  Glucagon (GVOKE HYPOPEN) 1 MG/0.2ML pen  insulin aspart (NOVOLOG FLEXPEN) 100 UNIT/ML pen  insulin aspart (NOVOLOG PENFILL) 100 UNIT/ML cartridge  insulin pen needle (31G X 8 MM) 31G X 8 MM miscellaneous  levETIRAcetam (KEPPRA) 500 MG tablet  loratadine (CLARITIN) 10 MG tablet  METHYLCELLULOSE, LAXATIVE, PO  nystatin (MYCOSTATIN) 697934 UNIT/GM external cream  polyethylene glycol (MIRALAX) 17 GM/Dose powder  psyllium (METAMUCIL/KONSYL) capsule  SYNTHROID 75 MCG tablet  Vitamin D3 (CHOLECALCIFEROL) 125 MCG (5000 UT) tablet  zinc oxide (DESITIN) 20 % external ointment      Allergies   Allergen Reactions    Seasonal Allergies      Family History  Family History   Problem Relation Age of Onset    Acute Myocardial Infarction Father     Heart Disease Maternal Grandmother     Dementia Maternal Grandmother     Myocardial Infarction Father     Dementia Paternal Grandmother     Heart Disease  Paternal Grandmother      Social History   Social History     Tobacco Use    Smoking status: Former     Current packs/day: 0.00     Average packs/day: 0.5 packs/day for 35.0 years (17.5 ttl pk-yrs)     Types: Cigarettes     Start date: 1983     Quit date: 2018     Years since quittin.1    Smokeless tobacco: Never   Substance Use Topics    Alcohol use: Not Currently    Drug use: Not Currently      Past medical history, past surgical history, medications, allergies, family history, and social history were reviewed with the patient. No additional pertinent items.   A complete review of systems was performed with pertinent positives and negatives noted in the HPI, and all other systems negative.    Physical Exam   BP: (!) 152/82  Pulse: 100  Temp: 97.8  F (36.6  C)  Resp: 16  SpO2: 96 %  Physical Exam  Vitals and nursing note reviewed.   Constitutional:       General: She is not in acute distress.     Appearance: She is not diaphoretic.      Comments: Adult female, sitting back in wheelchair.  Not interactive.  Needs to be cued by her daughter to swallow.   HENT:      Head: Atraumatic.      Mouth/Throat:      Mouth: Mucous membranes are moist.      Pharynx: Oropharynx is clear. No oropharyngeal exudate.   Eyes:      General: No scleral icterus.     Pupils: Pupils are equal, round, and reactive to light.   Cardiovascular:      Rate and Rhythm: Normal rate.      Pulses: Normal pulses.      Heart sounds: Normal heart sounds. No murmur heard.  Pulmonary:      Effort: No respiratory distress.      Breath sounds: Normal breath sounds.   Abdominal:      Palpations: Abdomen is soft.      Tenderness: There is no abdominal tenderness. There is no guarding.      Comments: Obese, soft, no obvious tenderness to palpation   Musculoskeletal:         General: No tenderness.   Skin:     General: Skin is warm.      Findings: No rash.   Neurological:      Comments: Sitting in wheelchair, not interactive verbally.  She does  respond to certain commands when her daughter cues her.  Drinking Diet Coke.         ED Course, Procedures, & Data      Procedures               Results for orders placed or performed during the hospital encounter of 08/23/24   Head CT w/o contrast     Status: None (Preliminary result)    Narrative    EXAM: CT HEAD W/O CONTRAST  LOCATION: [facility]  DATE: 8/23/2024    INDICATION: Lethargy, hx of CVA, eval for acute process.  COMPARISON: Brain MRI 6/16/2024.  TECHNIQUE: Routine CT Head without IV contrast. Multiplanar reformats. Dose reduction techniques were used.    FINDINGS:  INTRACRANIAL CONTENTS: No intracranial hemorrhage, extraaxial collection, or mass effect.  No CT evidence of acute infarct. Mild-to-moderate volume loss and presumed chronic small vessel ischemia. Evolutionary change of the now chronic right frontal   opercular infarct which was imaged in its acute phase on 6/16/2024. Chronic infarct is seen within the basal ganglia bilaterally.    VISUALIZED ORBITS/SINUSES/MASTOIDS: No intraorbital abnormality. No paranasal sinus mucosal disease. No middle ear or mastoid effusion.    BONES/SOFT TISSUES: No acute abnormality.      Impression    IMPRESSION:  1.  No acute intracranial abnormality.    2.  Interval evolutionary change of the now chronic right frontal opercular infarct which was imaged in its acute phase 6/16/2024.    3.  Age-related and chronic ischemic changes are otherwise stable.   Chest XR,  PA & LAT     Status: None    Narrative    EXAM: XR CHEST 2 VIEWS  LOCATION: [facility]  DATE: 8/23/2024    INDICATION: lethargy, dementia, eval for infiltrate  COMPARISON: 9/6/2023.      Impression    IMPRESSION: Negative chest.   Comprehensive metabolic panel     Status: Abnormal   Result Value Ref Range    Sodium 144 135 - 145 mmol/L    Potassium 4.5 3.4 - 5.3 mmol/L    Carbon Dioxide (CO2) 23 22 - 29 mmol/L     Anion Gap 13 7 - 15 mmol/L    Urea Nitrogen 46.2 (H) 8.0 - 23.0 mg/dL    Creatinine 1.87 (H) 0.51 - 0.95 mg/dL    GFR Estimate 29 (L) >60 mL/min/1.73m2    Calcium 9.8 8.8 - 10.4 mg/dL    Chloride 108 (H) 98 - 107 mmol/L    Glucose 397 (H) 70 - 99 mg/dL    Alkaline Phosphatase 177 (H) 40 - 150 U/L    AST 19 0 - 45 U/L    ALT 9 0 - 50 U/L    Protein Total 7.6 6.4 - 8.3 g/dL    Albumin 4.2 3.5 - 5.2 g/dL    Bilirubin Total 0.3 <=1.2 mg/dL   Ketone Beta-Hydroxybutyrate Quantitative,Rapid     Status: Normal   Result Value Ref Range    Ketone (Beta-Hydroxybutyrate) Quantitative 0.24 <=0.30 mmol/L   UA with Microscopic reflex to Culture     Status: Abnormal    Specimen: Urine, Catheter   Result Value Ref Range    Color Urine Yellow Colorless, Straw, Light Yellow, Yellow    Appearance Urine Cloudy (A) Clear    Glucose Urine >=1000 (A) Negative mg/dL    Bilirubin Urine Negative Negative    Ketones Urine Trace (A) Negative mg/dL    Specific Gravity Urine 1.023 1.003 - 1.035    Blood Urine Trace (A) Negative    pH Urine 5.5 5.0 - 7.0    Protein Albumin Urine 100 (A) Negative mg/dL    Urobilinogen Urine Normal Normal, 2.0 mg/dL    Nitrite Urine Negative Negative    Leukocyte Esterase Urine Large (A) Negative    WBC Clumps Urine Present (A) None Seen /HPF    Budding Yeast Urine Few (A) None Seen /HPF    Mucus Urine Present (A) None Seen /LPF    RBC Urine 7 (H) <=2 /HPF    WBC Urine 118 (H) <=5 /HPF    Squamous Epithelials Urine 1 <=1 /HPF    Narrative    Urine Culture ordered based on laboratory criteria   Symptomatic Influenza A/B, RSV, & SARS-CoV2 PCR (COVID-19) Nose     Status: Normal    Specimen: Nose; Swab   Result Value Ref Range    Influenza A PCR Negative Negative    Influenza B PCR Negative Negative    RSV PCR Negative Negative    SARS CoV2 PCR Negative Negative    Narrative    Testing was performed using the Xpert Xpress CoV2/Flu/RSV Assay on the [employer]. This test should be ordered for the  detection of SARS-CoV2, influenza, and RSV viruses in individuals with signs and symptoms of respiratory tract infection. This test is for in vitro diagnostic use under the [place] FDA for laboratories certified under CLIA to perform high or moderate complexity testing. This test has been [place] FDA cleared. A negative result does not rule out the presence of PCR inhibitors in the specimen or target RNA in concentration below the limit of detection for the assay. If only one viral target is positive but coinfection with multiple targets is suspected, the sample should be re-tested with another FDA cleared, approved, or authorized test, if coninfection would change clinical management. This test was validated by the [facility]. These laboratories are certified under the Clinical Laboratory Improvement Amendments of 1988 (CLIA-88) as qualified to perfom high complexity laboratory testing.   CBC with platelets and differential     Status: Abnormal   Result Value Ref Range    WBC Count 6.9 4.0 - 11.0 10e3/uL    RBC Count 4.73 3.80 - 5.20 10e6/uL    Hemoglobin 13.1 11.7 - 15.7 g/dL    Hematocrit 43.5 35.0 - 47.0 %    MCV 92 78 - 100 fL    MCH 27.7 26.5 - 33.0 pg    MCHC 30.1 (L) 31.5 - 36.5 g/dL    RDW 15.2 (H) 10.0 - 15.0 %    Platelet Count 247 150 - 450 10e3/uL    % Neutrophils 67 %    % Lymphocytes 28 %    % Monocytes 5 %    % Eosinophils 0 %    % Basophils 0 %    % Immature Granulocytes 0 %    NRBCs per 100 WBC 0 <1 /100    Absolute Neutrophils 4.6 1.6 - 8.3 10e3/uL    Absolute Lymphocytes 1.9 0.8 - 5.3 10e3/uL    Absolute Monocytes 0.3 0.0 - 1.3 10e3/uL    Absolute Eosinophils 0.0 0.0 - 0.7 10e3/uL    Absolute Basophils 0.0 0.0 - 0.2 10e3/uL    Absolute Immature Granulocytes 0.0 <=0.4 10e3/uL    Absolute NRBCs 0.0 10e3/uL   Glucose by meter     Status: Abnormal   Result Value Ref Range    GLUCOSE BY METER POCT 334 (H) 70 - 99 mg/dL   CBC with Platelets & Differential     Status: Abnormal    Narrative     The following orders were created for panel order CBC with Platelets & Differential.  Procedure                               Abnormality         Status                     ---------                               -----------         ------                     CBC with platelets and d...[[id number]]  Abnormal            Final result                 Please view results for these tests on the individual orders.     Medications   insulin glargine (LANTUS PEN) injection 18 Units (18 Units Subcutaneous $Given 8/23/24 2337)   cefTRIAXone (ROCEPHIN) 1 g vial to attach to  mL bag for ADULTS or NS 50 mL bag for PEDS (1 g Intravenous $New Bag 8/23/24 2337)     Labs Ordered and Resulted from Time of ED Arrival to Time of ED Departure   COMPREHENSIVE METABOLIC PANEL - Abnormal       Result Value    Sodium 144      Potassium 4.5      Carbon Dioxide (CO2) 23      Anion Gap 13      Urea Nitrogen 46.2 (*)     Creatinine 1.87 (*)     GFR Estimate 29 (*)     Calcium 9.8      Chloride 108 (*)     Glucose 397 (*)     Alkaline Phosphatase 177 (*)     AST 19      ALT 9      Protein Total 7.6      Albumin 4.2      Bilirubin Total 0.3     ROUTINE UA WITH MICROSCOPIC REFLEX TO CULTURE - Abnormal    Color Urine Yellow      Appearance Urine Cloudy (*)     Glucose Urine >=1000 (*)     Bilirubin Urine Negative      Ketones Urine Trace (*)     Specific Gravity Urine 1.023      Blood Urine Trace (*)     pH Urine 5.5      Protein Albumin Urine 100 (*)     Urobilinogen Urine Normal      Nitrite Urine Negative      Leukocyte Esterase Urine Large (*)     WBC Clumps Urine Present (*)     Budding Yeast Urine Few (*)     Mucus Urine Present (*)     RBC Urine 7 (*)     WBC Urine 118 (*)     Squamous Epithelials Urine 1     CBC WITH PLATELETS AND DIFFERENTIAL - Abnormal    WBC Count 6.9      RBC Count 4.73      Hemoglobin 13.1      Hematocrit 43.5      MCV 92      MCH 27.7      MCHC 30.1 (*)     RDW 15.2 (*)     Platelet Count 247      % Neutrophils  67      % Lymphocytes 28      % Monocytes 5      % Eosinophils 0      % Basophils 0      % Immature Granulocytes 0      NRBCs per 100 WBC 0      Absolute Neutrophils 4.6      Absolute Lymphocytes 1.9      Absolute Monocytes 0.3      Absolute Eosinophils 0.0      Absolute Basophils 0.0      Absolute Immature Granulocytes 0.0      Absolute NRBCs 0.0     GLUCOSE BY METER - Abnormal    GLUCOSE BY METER POCT 334 (*)    KETONE BETA-HYDROXYBUTYRATE QUANTITATIVE, RAPID - Normal    Ketone (Beta-Hydroxybutyrate) Quantitative 0.24     INFLUENZA A/B, RSV, & SARS-COV2 PCR - Normal    Influenza A PCR Negative      Influenza B PCR Negative      RSV PCR Negative      SARS CoV2 PCR Negative     GLUCOSE MONITOR NURSING POCT   URINE CULTURE     Head CT w/o contrast   Preliminary Result   IMPRESSION:   1.  No acute intracranial abnormality.      2.  Interval evolutionary change of the now chronic right frontal opercular infarct which was imaged in its acute phase 6/16/2024.      3.  Age-related and chronic ischemic changes are otherwise stable.      Chest XR,  PA & LAT   Final Result   IMPRESSION: Negative chest.             Critical care was not performed.     Medical Decision Making  The patient's presentation was of high complexity (a chronic illness severe exacerbation, progression, or side effect of treatment).    The patient's evaluation involved:  an assessment requiring an independent historian (see separate area of note for details)  review of external note(s) from 2 sources (see separate area of note for details)  review of 2 test result(s) ordered prior to this encounter (see separate area of note for details)  ordering and/or review of 3+ test(s) in this encounter (see separate area of note for details)  discussion of management or test interpretation with another health professional (see separate area of note for details)    The patient's management necessitated high risk (a decision regarding  "hospitalization).    Assessment & Plan    This is a 64-year-old female with extensive past medical history including multiple previous strokes, vascular dementia, stage III kidney disease, type 1 diabetes, who presents to the emergency department today with her daughter who reports that she is concerned about possible UTI due to change in behavior recently.  This behavior change includes lethargy, being less interactive, as well as \"holding food and water\" in her mouth and not swallowing it for several minutes which apparently is a symptom of recrudescence of previous strokes for her.  This has happened in the past when she has had UTIs.  She is quite difficult to evaluate due to this.  Certainly need to consider any sort of infectious process including potential for UTI.  COVID would be inferiorly possible that she has not had any respiratory symptoms.  No GI symptoms.  Dehydration, electrolyte abnormality, or hypoglycemia could be possible.    We did establish IV access and we did draw blood for laboratory analysis.  CBC shows normal WBC, normal hemoglobin, normal platelet count, CMP shows normal electrolytes, creatinine of 1.87, up slightly from previous, glucose is 397 bicarb is normal, ketones 0.24, point-of-care glucose is 334.  UA is remarkable for large LE, white blood cell clumps, 118 white cells, 7 red cells, this will be cultured.  Previous urine culture in June showed E. coli.  Based on susceptibilities we have ordered ceftriaxone for her.  COVID swab is negative.  Chest x-ray is negative per my read, radiology agrees that there does not appear to be any acute infiltrate, head CT was read by radiology as no acute intracranial abnormality, there is interval evolutionary change of the now chronic right frontal infarct which was imaged in its acute phase in June 2024.    At this point we will plan to admit the patient to the hospital given her UTI, confusion/lethargy, and difficulty with oral intake.  " Discussed with triage hospitalist, Dr. Ayala.     I have reviewed the nursing notes. I have reviewed the findings, diagnosis, plan and need for follow up with the patient.    New Prescriptions    No medications on file       Final diagnoses:   Urinary tract infection in female   Lethargy   History of CVA (cerebrovascular accident)   H/O insulin dependent diabetes mellitus   Chronic kidney disease, unspecified CKD stage       Graciela Mayes MD  [facility] EMERGENCY DEPARTMENT  8/23/2024     Graciela Mayes MD  08/23/24 [id number]       Graciela Mayes MD  08/23/24 [id number]

## 2024-08-24 NOTE — PROGRESS NOTES
Patient's daughter, Vishal, returned call from earlier this morning. She requested her mother to have a mechanical soft diet and code status to be DNR/DNI.  - Updated in EPIC and communicated to Dr. Jerome Mcdonald, CNP

## 2024-08-24 NOTE — ED TRIAGE NOTES
Pt arrives to ED with c/o possible UTI. Pt c/o pain with urination and abdominal pain. Family endorse hyperglycemia, increased confusion, loss of appetite. Hx of dementia and stroke. Pt is incontinent.

## 2024-08-24 NOTE — PLAN OF CARE
Neuro: A&Ox2, disoriented to situation and time   Cardiac: SR. VSS.   Respiratory: Sating above 92% on RA.  GI/: Incontinent of bowel and bladder, purewick in place, oliguric. No BM this shift.  Diet/appetite: Tolerating Mechanical/dental soft diet. Keeps food in her mouth for long time, she needs to encourage to swallow.    Activity:  Assist of 2  Pain: At acceptable level on current regimen.   Skin: No new deficits noted.  LDA's: PIV, saline locked  Plan: Continue with POC. Notify primary team with changes.  Goal Outcome Evaluation:      Plan of Care Reviewed With: patient

## 2024-08-25 LAB
ANION GAP SERPL CALCULATED.3IONS-SCNC: 12 MMOL/L (ref 7–15)
ATRIAL RATE - MUSE: 89 BPM
BACTERIA UR CULT: ABNORMAL
BACTERIA UR CULT: ABNORMAL
BUN SERPL-MCNC: 35.5 MG/DL (ref 8–23)
CALCIUM SERPL-MCNC: 9 MG/DL (ref 8.8–10.4)
CHLORIDE SERPL-SCNC: 104 MMOL/L (ref 98–107)
CREAT SERPL-MCNC: 1.43 MG/DL (ref 0.51–0.95)
DIASTOLIC BLOOD PRESSURE - MUSE: NORMAL MMHG
EGFRCR SERPLBLD CKD-EPI 2021: 40 ML/MIN/1.73M2
ERYTHROCYTE [DISTWIDTH] IN BLOOD BY AUTOMATED COUNT: 14.2 % (ref 10–15)
GLUCOSE BLDC GLUCOMTR-MCNC: 160 MG/DL (ref 70–99)
GLUCOSE BLDC GLUCOMTR-MCNC: 201 MG/DL (ref 70–99)
GLUCOSE BLDC GLUCOMTR-MCNC: 219 MG/DL (ref 70–99)
GLUCOSE BLDC GLUCOMTR-MCNC: 260 MG/DL (ref 70–99)
GLUCOSE BLDC GLUCOMTR-MCNC: 295 MG/DL (ref 70–99)
GLUCOSE BLDC GLUCOMTR-MCNC: 315 MG/DL (ref 70–99)
GLUCOSE BLDC GLUCOMTR-MCNC: 66 MG/DL (ref 70–99)
GLUCOSE BLDC GLUCOMTR-MCNC: 91 MG/DL (ref 70–99)
GLUCOSE SERPL-MCNC: 57 MG/DL (ref 70–99)
HCO3 SERPL-SCNC: 22 MMOL/L (ref 22–29)
HCT VFR BLD AUTO: 38.2 % (ref 35–47)
HGB BLD-MCNC: 12.6 G/DL (ref 11.7–15.7)
INTERPRETATION ECG - MUSE: NORMAL
MCH RBC QN AUTO: 28.7 PG (ref 26.5–33)
MCHC RBC AUTO-ENTMCNC: 33 G/DL (ref 31.5–36.5)
MCV RBC AUTO: 87 FL (ref 78–100)
P AXIS - MUSE: 32 DEGREES
PLATELET # BLD AUTO: 222 10E3/UL (ref 150–450)
POTASSIUM SERPL-SCNC: 3.6 MMOL/L (ref 3.4–5.3)
PR INTERVAL - MUSE: 132 MS
QRS DURATION - MUSE: 90 MS
QT - MUSE: 390 MS
QTC - MUSE: 474 MS
R AXIS - MUSE: 28 DEGREES
RBC # BLD AUTO: 4.39 10E6/UL (ref 3.8–5.2)
SODIUM SERPL-SCNC: 138 MMOL/L (ref 135–145)
SYSTOLIC BLOOD PRESSURE - MUSE: NORMAL MMHG
T AXIS - MUSE: 15 DEGREES
VENTRICULAR RATE- MUSE: 89 BPM
WBC # BLD AUTO: 6.1 10E3/UL (ref 4–11)

## 2024-08-25 PROCEDURE — 250N000011 HC RX IP 250 OP 636: Performed by: NURSE PRACTITIONER

## 2024-08-25 PROCEDURE — 85027 COMPLETE CBC AUTOMATED: CPT | Performed by: STUDENT IN AN ORGANIZED HEALTH CARE EDUCATION/TRAINING PROGRAM

## 2024-08-25 PROCEDURE — 93010 ELECTROCARDIOGRAM REPORT: CPT | Performed by: INTERNAL MEDICINE

## 2024-08-25 PROCEDURE — 80048 BASIC METABOLIC PNL TOTAL CA: CPT | Performed by: STUDENT IN AN ORGANIZED HEALTH CARE EDUCATION/TRAINING PROGRAM

## 2024-08-25 PROCEDURE — 36415 COLL VENOUS BLD VENIPUNCTURE: CPT | Performed by: STUDENT IN AN ORGANIZED HEALTH CARE EDUCATION/TRAINING PROGRAM

## 2024-08-25 PROCEDURE — 250N000013 HC RX MED GY IP 250 OP 250 PS 637: Performed by: NURSE PRACTITIONER

## 2024-08-25 PROCEDURE — 120N000002 HC R&B MED SURG/OB UMMC

## 2024-08-25 PROCEDURE — 250N000013 HC RX MED GY IP 250 OP 250 PS 637: Performed by: INTERNAL MEDICINE

## 2024-08-25 PROCEDURE — 250N000013 HC RX MED GY IP 250 OP 250 PS 637: Performed by: HOSPITALIST

## 2024-08-25 PROCEDURE — 99232 SBSQ HOSP IP/OBS MODERATE 35: CPT | Performed by: INTERNAL MEDICINE

## 2024-08-25 PROCEDURE — 250N000013 HC RX MED GY IP 250 OP 250 PS 637: Performed by: STUDENT IN AN ORGANIZED HEALTH CARE EDUCATION/TRAINING PROGRAM

## 2024-08-25 RX ORDER — FLUCONAZOLE 200 MG/1
400 TABLET ORAL DAILY
Status: DISCONTINUED | OUTPATIENT
Start: 2024-08-25 | End: 2024-08-25 | Stop reason: DRUGHIGH

## 2024-08-25 RX ORDER — OLANZAPINE 2.5 MG/1
2.5 TABLET, FILM COATED ORAL 2 TIMES DAILY PRN
Status: DISCONTINUED | OUTPATIENT
Start: 2024-08-25 | End: 2024-08-28 | Stop reason: HOSPADM

## 2024-08-25 RX ORDER — FLUCONAZOLE 100 MG/1
100 TABLET ORAL DAILY
Status: DISCONTINUED | OUTPATIENT
Start: 2024-08-26 | End: 2024-08-28 | Stop reason: HOSPADM

## 2024-08-25 RX ORDER — FLUCONAZOLE 200 MG/1
200 TABLET ORAL ONCE
Status: COMPLETED | OUTPATIENT
Start: 2024-08-25 | End: 2024-08-25

## 2024-08-25 RX ADMIN — CEFTRIAXONE SODIUM 1 G: 1 INJECTION, POWDER, FOR SOLUTION INTRAMUSCULAR; INTRAVENOUS at 22:08

## 2024-08-25 RX ADMIN — CARVEDILOL 12.5 MG: 12.5 TABLET, FILM COATED ORAL at 08:59

## 2024-08-25 RX ADMIN — LEVETIRACETAM 500 MG: 500 TABLET, FILM COATED ORAL at 09:10

## 2024-08-25 RX ADMIN — FLUCONAZOLE 200 MG: 200 TABLET ORAL at 12:07

## 2024-08-25 RX ADMIN — LEVETIRACETAM 500 MG: 500 TABLET, FILM COATED ORAL at 19:51

## 2024-08-25 RX ADMIN — INSULIN ASPART 3 UNITS: 100 INJECTION, SOLUTION INTRAVENOUS; SUBCUTANEOUS at 17:16

## 2024-08-25 RX ADMIN — OLANZAPINE 2.5 MG: 2.5 TABLET, FILM COATED ORAL at 06:01

## 2024-08-25 RX ADMIN — CLOPIDOGREL BISULFATE 75 MG: 75 TABLET ORAL at 08:59

## 2024-08-25 RX ADMIN — DULOXETINE HYDROCHLORIDE 20 MG: 20 CAPSULE, DELAYED RELEASE ORAL at 08:59

## 2024-08-25 RX ADMIN — DEXTROSE 15 G: 15 GEL ORAL at 09:10

## 2024-08-25 RX ADMIN — NICOTINE 1 PATCH: 7 PATCH, EXTENDED RELEASE TRANSDERMAL at 06:02

## 2024-08-25 RX ADMIN — INSULIN ASPART 4 UNITS: 100 INJECTION, SOLUTION INTRAVENOUS; SUBCUTANEOUS at 11:21

## 2024-08-25 RX ADMIN — LEVOTHYROXINE SODIUM 75 MCG: 25 TABLET ORAL at 08:59

## 2024-08-25 RX ADMIN — AMLODIPINE BESYLATE 5 MG: 5 TABLET ORAL at 08:59

## 2024-08-25 RX ADMIN — ATORVASTATIN CALCIUM 40 MG: 40 TABLET, FILM COATED ORAL at 08:59

## 2024-08-25 RX ADMIN — CARVEDILOL 12.5 MG: 12.5 TABLET, FILM COATED ORAL at 17:12

## 2024-08-25 RX ADMIN — ACETAMINOPHEN 650 MG: 325 TABLET, FILM COATED ORAL at 22:07

## 2024-08-25 RX ADMIN — ASPIRIN 81 MG CHEWABLE TABLET 81 MG: 81 TABLET CHEWABLE at 08:59

## 2024-08-25 RX ADMIN — GABAPENTIN 100 MG: 100 CAPSULE ORAL at 22:07

## 2024-08-25 ASSESSMENT — ACTIVITIES OF DAILY LIVING (ADL)
ADLS_ACUITY_SCORE: 53
ADLS_ACUITY_SCORE: 56
ADLS_ACUITY_SCORE: 53
ADLS_ACUITY_SCORE: 56
ADLS_ACUITY_SCORE: 56
ADLS_ACUITY_SCORE: 53
ADLS_ACUITY_SCORE: 56
ADLS_ACUITY_SCORE: 53
ADLS_ACUITY_SCORE: 56
ADLS_ACUITY_SCORE: 53
ADLS_ACUITY_SCORE: 53
ADLS_ACUITY_SCORE: 56
ADLS_ACUITY_SCORE: 53
ADLS_ACUITY_SCORE: 56
ADLS_ACUITY_SCORE: 53

## 2024-08-25 NOTE — PLAN OF CARE
Goal Outcome Evaluation:      Plan of Care Reviewed With: patient    Overall Patient Progress: no changeOverall Patient Progress: no change    Outcome Evaluation: Patient alert to self. Disoriented to place, time, and situation. Calm and cooperative. 1:1 sitter continue for safety. Staff anticipate needs. Patient denies SOB, chest pain, pain, and nausea. VSS on RA. Patient Ax1 with walker and gait belt. Incontinent of bowel and bladder, last BM, patient attempt to void x3 but was unsuccessful. Fluids encouraged. Bladder scan completed per protocol. Left PIV removed d/t leaking at the site.  Intact skin, except dry lips. Patient can independently change positions, frequent weight shifting encouraged.    Patient morning blood sugar result was 66, patient asymptomatic, PRN glucose gel and orange juice were administered. Rechecked minutes later was 91.       Continue with plan of care.

## 2024-08-25 NOTE — PROGRESS NOTES
[facility]    Medicine Progress Note - Hospitalist Service, GOLD TEAM 19    Date of Admission:  2024    Assessment & Plan      Isabell Matthews is a 64 year old female admitted on 2024. She has a past medical history of dementia, DM I, multiple CVA, seizures, HTN, HLD, CKD, hypothyroidism,UTIs and depression. She presents to the ED with symptoms of UTI.    # UTI  # Candida albicans cystitis  Daughter reports increased confusion which is usually a symptoms of UTI. In the ED, WBC was 6.9, UA with large LE and WBC, negative nitrite. She was started on rocephin  - Continue Rocephin for now  - Add fluconazole  - Follow urine culture  - Monitor CBC    # T1DM  # Polyneuropathy  Blood sugars elevated 350-390 on admit, ketones 0.24. She received 18 units lantus in ED  - Decrease Lantus to 14 units daily in the setting of decreased oral intake  - Medium dose correction scale Novolog  - Hypoglycemic protocol  - PTA gabapentin     # Dementia  # Multiple strokes: right basal ganglia ischemic infarct in , left basal ganglia hemorrhagic stroke , right centrum semiovale & right chuck , and  acute/subacute right insular and right frontal operculum infarct 24  # Seizures   She has baseline dysarthria and dysphagia. Uses a walker due to gait instability. Admitted 24-24 after coming in with left facial droop and right sided weakness and found to have a acute/subacute right insular and right frontal operculum infarct. She was started in asa and plavix . CT of head negative for acute changes tonight.  - Aspirin 81 mg and clopidogrel 75 mg daily for 90 days followed by aspirin 81 mg daily indefinitely starting 24  - PTA Keppra  - Up with assist and walker  - Fall precaution    # EVON-on-CKD  Creatinine usually 1.5-1.68 and it was 1.87 on admit and GFR 29. Previous GFR was 42. Daughter noticed  Po intake. She has been drinking well in the ED  -  Encourage PO intake  - Monitor BMP    # HTN  # HLD  Echo 6/17/24 shows normal LV function with an EF of 60-65%. Cardiac event monitors in the past due to frequent CVA, none showing atrial fib/flutter  - PTA Coreg 12.5 mg twice daily and amlodipine 5 mg daily     # Hypothyroidism  - PTA Synthroid    # Depression  Related to dementia and started on duloxetine  - PTA duloxetine          Diet: Mechanical/Dental Soft Diet    DVT Prophylaxis: Pneumatic Compression Devices  Parker Catheter: Not present  Lines: None     Cardiac Monitoring: None  Code Status: No CPR- Do NOT Intubate      Clinically Significant Risk Factors                  # Hypertension: Noted on problem list    # Dementia: noted on problem list            # Financial/Environmental Concerns:                 Disposition Plan     Medically Ready for Discharge: Anticipated Tomorrow             Matt Emanuel DO, S  Hospitalist Service, GOLD TEAM [address] [place] [address]  Securely message with [employer] (more info)  Text page via [facility] Paging/Directory   See signed in provider for up to date coverage information  ______________________________________________________________________    Interval History   Unable to obtain meaningful review of systems from patient.  Patient reports he feels cold.  Patient reports she is on antibiotics.  Urine culture reviewed; growing Candida albicans.  Add fluconazole (renal dosing per pharmacy).    Physical Exam   Vital Signs: Temp: 97.9  F (36.6  C) Temp src: Oral BP: 134/70 Pulse: 82   Resp: 14 SpO2: 99 % O2 Device: None (Room air)    Weight: 0 lbs 0 oz    GENERAL: Awake; alert; no acute distress; well-nourished.  HEENT: Normocephalic; atraumatic; PERRLA; MMM.  CV: RRR; normal S1, S2; no rubs, murmurs, or gallops.  RESP: Lung fields clear to aucultation B/L; no wheezing or crepitations.  GI: Abdomen is soft, nontender, nondistended; no organomegaly; normal bowel sounds.  : Deferred genital  examination.   MSK: No clubbing, cyanosis, or edema.  DERM: Skin is intact; no rash, lesions, or skin breakdown.  NEURO: No focal deficits appreciated; strength & sensorium are grossly intact.  PSYCH: Psychomotor slowing; insight is poor.    Medical Decision Making       45 MINUTES SPENT BY ME on the date of service doing chart review, history, exam, documentation & further activities per the note.      Data     I have personally reviewed the following data over the past 24 hrs:    6.1  \   12.6   / 222     138 104 35.5 (H) /  295 (H)   3.6 22 1.43 (H) \       Imaging results reviewed over the past 24 hrs:   No results found for this or any previous visit (from the past 24 hour(s)).

## 2024-08-25 NOTE — PLAN OF CARE
Goal Outcome Evaluation:  Pt arrived on unit around midnight. Pt is anxious and asks for cigarettes. Provider notified. Nicotine patch applied, Zyprexa given.    Orientation: Aox1  Bowel: Incontinent  Bladder: Incontinent  Pain: Denies  Ambulation/Transfers: Ax1   Blood sugars: N/a   Diet/ Liquids: Soft   Tubes/ Lines/ Drains: None  Tube Feeding: n/a   Oxygen: n/a   Skin: Visible skin intact

## 2024-08-26 ENCOUNTER — APPOINTMENT (OUTPATIENT)
Dept: SPEECH THERAPY | Facility: CLINIC | Age: 64
DRG: 690 | End: 2024-08-26
Attending: INTERNAL MEDICINE
Payer: COMMERCIAL

## 2024-08-26 LAB
ANION GAP SERPL CALCULATED.3IONS-SCNC: 9 MMOL/L (ref 7–15)
BUN SERPL-MCNC: 25.6 MG/DL (ref 8–23)
CALCIUM SERPL-MCNC: 9.1 MG/DL (ref 8.8–10.4)
CHLORIDE SERPL-SCNC: 108 MMOL/L (ref 98–107)
CREAT SERPL-MCNC: 1.5 MG/DL (ref 0.51–0.95)
EGFRCR SERPLBLD CKD-EPI 2021: 38 ML/MIN/1.73M2
GLUCOSE BLDC GLUCOMTR-MCNC: 130 MG/DL (ref 70–99)
GLUCOSE BLDC GLUCOMTR-MCNC: 181 MG/DL (ref 70–99)
GLUCOSE BLDC GLUCOMTR-MCNC: 260 MG/DL (ref 70–99)
GLUCOSE BLDC GLUCOMTR-MCNC: 289 MG/DL (ref 70–99)
GLUCOSE BLDC GLUCOMTR-MCNC: 402 MG/DL (ref 70–99)
GLUCOSE SERPL-MCNC: 132 MG/DL (ref 70–99)
HCO3 SERPL-SCNC: 28 MMOL/L (ref 22–29)
HOLD SPECIMEN: NORMAL
POTASSIUM SERPL-SCNC: 4.3 MMOL/L (ref 3.4–5.3)
PROCALCITONIN SERPL IA-MCNC: 0.07 NG/ML
SODIUM SERPL-SCNC: 145 MMOL/L (ref 135–145)

## 2024-08-26 PROCEDURE — 99418 PROLNG IP/OBS E/M EA 15 MIN: CPT | Performed by: INTERNAL MEDICINE

## 2024-08-26 PROCEDURE — 250N000011 HC RX IP 250 OP 636: Performed by: NURSE PRACTITIONER

## 2024-08-26 PROCEDURE — 99233 SBSQ HOSP IP/OBS HIGH 50: CPT | Performed by: INTERNAL MEDICINE

## 2024-08-26 PROCEDURE — 84145 PROCALCITONIN (PCT): CPT | Performed by: INTERNAL MEDICINE

## 2024-08-26 PROCEDURE — 92610 EVALUATE SWALLOWING FUNCTION: CPT | Mod: GN

## 2024-08-26 PROCEDURE — 250N000013 HC RX MED GY IP 250 OP 250 PS 637: Performed by: STUDENT IN AN ORGANIZED HEALTH CARE EDUCATION/TRAINING PROGRAM

## 2024-08-26 PROCEDURE — 92526 ORAL FUNCTION THERAPY: CPT | Mod: GN

## 2024-08-26 PROCEDURE — 250N000013 HC RX MED GY IP 250 OP 250 PS 637: Performed by: HOSPITALIST

## 2024-08-26 PROCEDURE — 80048 BASIC METABOLIC PNL TOTAL CA: CPT | Performed by: INTERNAL MEDICINE

## 2024-08-26 PROCEDURE — 250N000013 HC RX MED GY IP 250 OP 250 PS 637: Performed by: INTERNAL MEDICINE

## 2024-08-26 PROCEDURE — 250N000013 HC RX MED GY IP 250 OP 250 PS 637: Performed by: NURSE PRACTITIONER

## 2024-08-26 PROCEDURE — 36415 COLL VENOUS BLD VENIPUNCTURE: CPT | Performed by: INTERNAL MEDICINE

## 2024-08-26 PROCEDURE — 120N000002 HC R&B MED SURG/OB UMMC

## 2024-08-26 RX ADMIN — CLOPIDOGREL BISULFATE 75 MG: 75 TABLET ORAL at 09:34

## 2024-08-26 RX ADMIN — INSULIN ASPART 6 UNITS: 100 INJECTION, SOLUTION INTRAVENOUS; SUBCUTANEOUS at 18:50

## 2024-08-26 RX ADMIN — NICOTINE 1 PATCH: 7 PATCH, EXTENDED RELEASE TRANSDERMAL at 09:34

## 2024-08-26 RX ADMIN — LEVOTHYROXINE SODIUM 75 MCG: 25 TABLET ORAL at 09:35

## 2024-08-26 RX ADMIN — GABAPENTIN 100 MG: 100 CAPSULE ORAL at 21:31

## 2024-08-26 RX ADMIN — INSULIN ASPART 3 UNITS: 100 INJECTION, SOLUTION INTRAVENOUS; SUBCUTANEOUS at 13:06

## 2024-08-26 RX ADMIN — LEVETIRACETAM 500 MG: 500 TABLET, FILM COATED ORAL at 21:31

## 2024-08-26 RX ADMIN — CARVEDILOL 12.5 MG: 12.5 TABLET, FILM COATED ORAL at 18:55

## 2024-08-26 RX ADMIN — CALCIUM CARBONATE (ANTACID) CHEW TAB 500 MG 1000 MG: 500 CHEW TAB at 21:38

## 2024-08-26 RX ADMIN — FLUCONAZOLE 100 MG: 100 TABLET ORAL at 13:06

## 2024-08-26 RX ADMIN — ASPIRIN 81 MG CHEWABLE TABLET 81 MG: 81 TABLET CHEWABLE at 09:34

## 2024-08-26 RX ADMIN — LEVETIRACETAM 500 MG: 500 TABLET, FILM COATED ORAL at 09:35

## 2024-08-26 RX ADMIN — CEFTRIAXONE SODIUM 1 G: 1 INJECTION, POWDER, FOR SOLUTION INTRAMUSCULAR; INTRAVENOUS at 21:31

## 2024-08-26 RX ADMIN — AMLODIPINE BESYLATE 5 MG: 5 TABLET ORAL at 09:35

## 2024-08-26 RX ADMIN — DULOXETINE HYDROCHLORIDE 20 MG: 20 CAPSULE, DELAYED RELEASE ORAL at 09:36

## 2024-08-26 RX ADMIN — CARVEDILOL 12.5 MG: 12.5 TABLET, FILM COATED ORAL at 09:35

## 2024-08-26 RX ADMIN — ATORVASTATIN CALCIUM 40 MG: 40 TABLET, FILM COATED ORAL at 09:34

## 2024-08-26 ASSESSMENT — ACTIVITIES OF DAILY LIVING (ADL)
ADLS_ACUITY_SCORE: 53
DEPENDENT_IADLS:: CLEANING;COOKING;LAUNDRY;SHOPPING;MEAL PREPARATION;MEDICATION MANAGEMENT;MONEY MANAGEMENT;TRANSPORTATION;INCONTINENCE
ADLS_ACUITY_SCORE: 53

## 2024-08-26 NOTE — PLAN OF CARE
Problem: Adult Inpatient Plan of Care  Goal: Absence of Hospital-Acquired Illness or Injury  Intervention: Prevent Infection  Recent Flowsheet Documentation  Taken 8/26/2024 1138 by Robbi Alfaro RN  Infection Prevention: equipment surfaces disinfected     Problem: UTI (Urinary Tract Infection)  Goal: Improved Infection Symptoms  Outcome: Not Progressing     Problem: Dementia Signs/Symptoms  Goal: Improved Behavioral Control (Dementia Signs/Symptoms)  Outcome: Not Progressing  Goal: Improved Mood Symptoms (Dementia Signs/Symptoms)  Outcome: Not Progressing  Goal: Improved Sleep (Dementia Signs/Symptoms)  Outcome: Not Progressing         VS:  Temp: 97.3  F (36.3  C) Temp src: Oral BP: (!) 162/93 Pulse: 79   Resp: 16 SpO2: 98 % O2 Device: None (Room air)     O2: SpO2 > 98 and stable on RA. LS clear and equal bilaterally. Denies chest pain and SOB.    Output: incontinent   Last BM: Fecal incontinence, denies abdominal discomfort. BS active / passing flatus.    Activity:  On bed    SBA per report / gait belt and walker   Skin: WDL    Pain: Pain managed with acetaminophen   CMS:  AOx1   Dressing:  none   Diet: Regular diet. Denies nausea/vomiting.   Pre breakfast at 130   LDA: L PIV SL    Equipment: IV pole, personal belongings,    Plan: standard precautions maintained / Continue with plan of care. Call light within reach  Plan: IV antibiotic   Additional Info:  With sitter   1145 bladder scan at 231   Left arm 3/5   Right arm 4/5    Gave the medications one by one, used moderatelhy thick apple juice

## 2024-08-26 NOTE — CONSULTS
Care Management Initial Consult    General Information  Assessment completed with: Children,    Type of CM/SW Visit: Initial Assessment    Primary Care Provider verified and updated as needed: Yes   Readmission within the last 30 days: no previous admission in last 30 days      Reason for Consult: discharge planning  Advance Care Planning: Advance Care Planning Reviewed: no concerns identified          Communication Assessment  Patient's communication style: spoken language (English or Bilingual)    Hearing Difficulty or Deaf: no   Wear Glasses or Blind: yes    Cognitive  Cognitive/Neuro/Behavioral: .WDL except, orientation  Level of Consciousness: confused  Arousal Level: opens eyes spontaneously  Orientation: disoriented to, place, time, situation  Mood/Behavior: calm, cooperative  Best Language: 0 - No aphasia  Speech: incoherent    Living Environment:   People in home: child(tiffany), adult     Current living Arrangements: apartment      Able to return to prior arrangements: yes       Family/Social Support:  Care provided by: child(tiffany), other (see comments) (PCA)  Provides care for: no one, unable/limited ability to care for self     Children, PCA          Description of Support System: Supportive, Involved    Support Assessment: Adequate family and caregiver support    Current Resources:   Patient receiving home care services: No     Community Resources: DME, PCA, Financial/Insurance, County Programs, County Worker  Equipment currently used at home: shower chair, walker, rolling, wheelchair, manual  Supplies currently used at home: Incontinence Supplies, Diabetic Supplies    Employment/Financial:  Employment Status: disabled        Financial Concerns: none           Does the patient's insurance plan have a 3 day qualifying hospital stay waiver?  No    Lifestyle & Psychosocial Needs:  Social Determinants of Health     Food Insecurity: Low Risk  (8/25/2024)    Food Insecurity     Within the past 12 months, did you  worry that your food would run out before you got money to buy more?: No     Within the past 12 months, did the food you bought just not last and you didn t have money to get more?: No   Depression: Not at risk (5/20/2024)    PHQ-2     PHQ-2 Score: 1   Housing Stability: Unknown (8/25/2024)    Housing Stability     Do you have housing? : Patient unable to answer     Are you worried about losing your housing?: Patient unable to answer   Tobacco Use: Medium Risk (7/8/2024)    Patient History     Smoking Tobacco Use: Former     Smokeless Tobacco Use: Never     Passive Exposure: Not on file   Financial Resource Strain: Unknown (8/25/2024)    Financial Resource Strain     Within the past 12 months, have you or your family members you live with been unable to get utilities (heat, electricity) when it was really needed?: Patient unable to answer   Alcohol Use: Not on file   Transportation Needs: Unknown (8/25/2024)    Transportation Needs     Within the past 12 months, has lack of transportation kept you from medical appointments, getting your medicines, non-medical meetings or appointments, work, or from getting things that you need?: Patient unable to answer   Physical Activity: Not on file   Interpersonal Safety: Low Risk  (8/25/2024)    Interpersonal Safety     Do you feel physically and emotionally safe where you currently live?: Yes     Within the past 12 months, have you been hit, slapped, kicked or otherwise physically hurt by someone?: No     Within the past 12 months, have you been humiliated or emotionally abused in other ways by your partner or ex-partner?: No   Stress: Not on file   Social Connections: Not on file   Health Literacy: Not on file       Functional Status:  Prior to admission patient needed assistance:   Dependent ADLs:: Ambulation-walker, Bathing, Dressing, Grooming, Incontinence, Positioning, Transfers, Wheelchair-with assist, Toileting  Dependent IADLs:: Cleaning, Cooking, Laundry, Shopping,  "Meal Preparation, Medication Management, Money Management, Transportation, Incontinence  Assesssment of Functional Status: At functional baseline    Mental Health Status:  Mental Health Status: No Current Concerns       Chemical Dependency Status:  Chemical Dependency Status: No Current Concerns             Values/Beliefs:  Spiritual, Cultural Beliefs, Jainism Practices, Values that affect care: no               Additional Information:  Per H&P \"Isabell Matthews is a 64 year old female admitted on 8/23/2024. She has a past medical history of dementia, DM I, multiple CVA, seizures, HTN, HLD, CKD, hypothyroidism,UTIs and depression. She presents to the ED with symptoms of UTI.\"    Writer completed assessment via phone with pt's daughter, Gali, r/t elevated readmission risk score and pt's baseline dementia. Pt lives in an apartment with her daughter, Gali, who is also her PCA for 40 hours a week. Pt has another PCA an additional 15 hours. Home DME includes a standard walker, manual wheelchair, shower chair, incontinence supplies, and diabetic supplies. Pt is on an Elderly Waiver and has Social Security income. Address and PCP verified.     Gali denied current financial, MH, or CD concerns. Writer inquired if pt was open to home care and Gali stated they \"had home care before, but I don't think we're going to need it\". Gali anticipates pt will discharge home tomorrow and will provide transport. Care management will continue to follow.     Karla Burkett -   Ph: [phone]     [facility] - incontinence supplies  Ph: [phone]  Fax: [phone]      Jailyn Deras RN   Nurse Coordinator    6 Med/Surg  Phone: [phone]    Social Work and Care Management Department     SEARCHABLE in [facility] - search CARE COORDINATOR   [facility] ([phone]) Saturday & Sunday; ([phone]) [facility] Recognized Holidays   Units: 6 Med Surg Vocera & 8 Med Surg Vocera Pager [phone]      "

## 2024-08-26 NOTE — PLAN OF CARE
Problem: Dementia Signs/Symptoms  Goal: Optimized Cognitive Function (Dementia Signs/Symptoms)  Outcome: Not Progressing     Problem: Dementia Signs/Symptoms  Goal: Improved Sleep (Dementia Signs/Symptoms)  Outcome: Not Progressing  Flowsheets (Taken 8/26/2024 0339)  Mutually Determined Action Steps (Improved Sleep): (Has been unable to sleep through the night) other (see comments)     Problem: UTI (Urinary Tract Infection)  Goal: Improved Infection Symptoms  Outcome: Progressing   Goal Outcome Evaluation: Denies chest pain, SOB, Nausea. Reported mild headache managed with PRN Tylenol. Didn't start to sleep until around 340am despite sleep hygiene being promoted. PVR measured under 300mL, encouraged to urinate regularly. No acute changes over night plan of care on going.      Plan of Care Reviewed With: patient    Overall Patient Progress: no changeOverall Patient Progress: no change

## 2024-08-26 NOTE — PLAN OF CARE
"  Problem: Adult Inpatient Plan of Care  Goal: Plan of Care Review  Description: The Plan of Care Review/Shift note should be completed every shift.  The Outcome Evaluation is a brief statement about your assessment that the patient is improving, declining, or no change.  This information will be displayed automatically on your shift  note.  Outcome: Progressing  Flowsheets (Taken 8/26/2024 1819)  Plan of Care Reviewed With: patient  Overall Patient Progress: improving  Goal: Patient-Specific Goal (Individualized)  Description: You can add care plan individualizations to a care plan. Examples of Individualization might be:  \"Parent requests to be called daily at 9am for status\", \"I have a hard time hearing out of my right ear\", or \"Do not touch me to wake me up as it startles  me\".  Outcome: Progressing  Goal: Absence of Hospital-Acquired Illness or Injury  Outcome: Progressing  Intervention: Identify and Manage Fall Risk  Recent Flowsheet Documentation  Taken 8/26/2024 1547 by Stephanie Wisdom, RN  Safety Promotion/Fall Prevention:   activity supervised   clutter free environment maintained   increased rounding and observation   lighting adjusted   nonskid shoes/slippers when out of bed   room door open   room near nurse's station   safety round/check completed  Intervention: Prevent Skin Injury  Recent Flowsheet Documentation  Taken 8/26/2024 1547 by Stephanie Wisdom, RN  Body Position:   position changed independently   supine, legs elevated  Intervention: Prevent Infection  Recent Flowsheet Documentation  Taken 8/26/2024 1547 by Stephanie Wisdom, RN  Infection Prevention: equipment surfaces disinfected  Goal: Optimal Comfort and Wellbeing  Outcome: Progressing  Goal: Readiness for Transition of Care  Outcome: Progressing   Goal Outcome Evaluation:      Plan of Care Reviewed With: patient    Overall Patient Progress: improvingOverall Patient Progress: improving        VS: Vital signs:  Temp: 97.7  F (36.5  C) Temp src: " "Oral BP: (!) 171/97 Pulse: 90   Resp: 16 SpO2: 95 % O2 Device: None (Room air)        Estimated body mass index is 24.27 kg/m  as calculated from the following:    Height as of 3/7/24: 1.6 m (5' 3\").    Weight as of 7/8/24: 62.1 kg (137 lb).        O2: Sating >95% on RA, denies SOB/Chest pain   Output: Voids spontaneously in bathroom, incontenent as well check and change   Last BM: 08/26/24 bowel sounds normoactive x4   Activity: Up with assist of 1 with gait belt and walker. Keep bed alarm on.   Up for meals? Yes   Skin: All visible skin intact small lump on right ear and left finger baseline.    Pain: Pt c/o abdominal upset PRN tums given and effective    CMS: AO x2, Denies N/V   Dressing: None   Diet: Soft easy to chew diet needs assist with ordering.   LDA: PIV to left arm SL CDI   Equipment:  IV pole, personal belongings, walker   Plan: Call light within reach, bed in a low position. Staff to check frequently patient is not always able to make needs known. Continue to monitor with POC.   Additional Info:  Bg 402 before supper writer notified provider and corrected as ordered 6 units. BG at bedtime 289 insulin given as ordered           "

## 2024-08-26 NOTE — PLAN OF CARE
Goal Outcome Evaluation:      Plan of Care Reviewed With: child, caregiver    Overall Patient Progress: no changeOverall Patient Progress: no change

## 2024-08-27 ENCOUNTER — APPOINTMENT (OUTPATIENT)
Dept: SPEECH THERAPY | Facility: CLINIC | Age: 64
DRG: 690 | End: 2024-08-27
Payer: COMMERCIAL

## 2024-08-27 ENCOUNTER — APPOINTMENT (OUTPATIENT)
Dept: PHYSICAL THERAPY | Facility: CLINIC | Age: 64
DRG: 690 | End: 2024-08-27
Attending: INTERNAL MEDICINE
Payer: COMMERCIAL

## 2024-08-27 LAB
GLUCOSE BLDC GLUCOMTR-MCNC: 132 MG/DL (ref 70–99)
GLUCOSE BLDC GLUCOMTR-MCNC: 144 MG/DL (ref 70–99)
GLUCOSE BLDC GLUCOMTR-MCNC: 236 MG/DL (ref 70–99)
GLUCOSE BLDC GLUCOMTR-MCNC: 244 MG/DL (ref 70–99)
GLUCOSE BLDC GLUCOMTR-MCNC: 309 MG/DL (ref 70–99)

## 2024-08-27 PROCEDURE — 250N000013 HC RX MED GY IP 250 OP 250 PS 637: Performed by: STUDENT IN AN ORGANIZED HEALTH CARE EDUCATION/TRAINING PROGRAM

## 2024-08-27 PROCEDURE — 92526 ORAL FUNCTION THERAPY: CPT | Mod: GN

## 2024-08-27 PROCEDURE — 97161 PT EVAL LOW COMPLEX 20 MIN: CPT | Mod: GP | Performed by: PHYSICAL THERAPIST

## 2024-08-27 PROCEDURE — 120N000002 HC R&B MED SURG/OB UMMC

## 2024-08-27 PROCEDURE — 250N000011 HC RX IP 250 OP 636: Performed by: NURSE PRACTITIONER

## 2024-08-27 PROCEDURE — 250N000013 HC RX MED GY IP 250 OP 250 PS 637: Performed by: HOSPITALIST

## 2024-08-27 PROCEDURE — 250N000013 HC RX MED GY IP 250 OP 250 PS 637: Performed by: INTERNAL MEDICINE

## 2024-08-27 PROCEDURE — 99233 SBSQ HOSP IP/OBS HIGH 50: CPT | Performed by: STUDENT IN AN ORGANIZED HEALTH CARE EDUCATION/TRAINING PROGRAM

## 2024-08-27 PROCEDURE — 250N000013 HC RX MED GY IP 250 OP 250 PS 637: Performed by: NURSE PRACTITIONER

## 2024-08-27 PROCEDURE — 999N000111 HC STATISTIC OT IP EVAL DEFER

## 2024-08-27 RX ADMIN — LEVETIRACETAM 500 MG: 500 TABLET, FILM COATED ORAL at 08:52

## 2024-08-27 RX ADMIN — CEFTRIAXONE SODIUM 1 G: 1 INJECTION, POWDER, FOR SOLUTION INTRAMUSCULAR; INTRAVENOUS at 22:26

## 2024-08-27 RX ADMIN — INSULIN ASPART 1 UNITS: 100 INJECTION, SOLUTION INTRAVENOUS; SUBCUTANEOUS at 12:27

## 2024-08-27 RX ADMIN — ATORVASTATIN CALCIUM 40 MG: 40 TABLET, FILM COATED ORAL at 08:52

## 2024-08-27 RX ADMIN — AMLODIPINE BESYLATE 5 MG: 5 TABLET ORAL at 08:51

## 2024-08-27 RX ADMIN — FLUCONAZOLE 100 MG: 100 TABLET ORAL at 08:52

## 2024-08-27 RX ADMIN — CARVEDILOL 12.5 MG: 12.5 TABLET, FILM COATED ORAL at 08:51

## 2024-08-27 RX ADMIN — ASPIRIN 81 MG CHEWABLE TABLET 81 MG: 81 TABLET CHEWABLE at 08:51

## 2024-08-27 RX ADMIN — INSULIN ASPART 3 UNITS: 100 INJECTION, SOLUTION INTRAVENOUS; SUBCUTANEOUS at 19:47

## 2024-08-27 RX ADMIN — LEVETIRACETAM 500 MG: 500 TABLET, FILM COATED ORAL at 20:24

## 2024-08-27 RX ADMIN — NICOTINE 1 PATCH: 7 PATCH, EXTENDED RELEASE TRANSDERMAL at 08:51

## 2024-08-27 RX ADMIN — CLOPIDOGREL BISULFATE 75 MG: 75 TABLET ORAL at 08:52

## 2024-08-27 RX ADMIN — GABAPENTIN 100 MG: 100 CAPSULE ORAL at 22:26

## 2024-08-27 RX ADMIN — LEVOTHYROXINE SODIUM 75 MCG: 25 TABLET ORAL at 08:50

## 2024-08-27 RX ADMIN — CARVEDILOL 12.5 MG: 12.5 TABLET, FILM COATED ORAL at 19:46

## 2024-08-27 RX ADMIN — DULOXETINE HYDROCHLORIDE 20 MG: 20 CAPSULE, DELAYED RELEASE ORAL at 08:52

## 2024-08-27 ASSESSMENT — ACTIVITIES OF DAILY LIVING (ADL)
ADLS_ACUITY_SCORE: 53
ADLS_ACUITY_SCORE: 52
ADLS_ACUITY_SCORE: 53
ADLS_ACUITY_SCORE: 52
ADLS_ACUITY_SCORE: 53
ADLS_ACUITY_SCORE: 52
ADLS_ACUITY_SCORE: 53
ADLS_ACUITY_SCORE: 53
ADLS_ACUITY_SCORE: 52
ADLS_ACUITY_SCORE: 53
ADLS_ACUITY_SCORE: 52
ADLS_ACUITY_SCORE: 53

## 2024-08-27 NOTE — PLAN OF CARE
Problem: Adult Inpatient Plan of Care  Goal: Absence of Hospital-Acquired Illness or Injury  Intervention: Prevent Skin Injury  Recent Flowsheet Documentation  Taken 8/27/2024 1248 by Robbi Alfaro, RN  Body Position:   lower extremity elevated   foot of bed elevated   legs elevated  Intervention: Prevent Infection  Recent Flowsheet Documentation  Taken 8/27/2024 1042 by Robbi Alfaro, RN  Infection Prevention:   hand hygiene promoted   equipment surfaces disinfected     Problem: UTI (Urinary Tract Infection)  Goal: Improved Infection Symptoms  Outcome: Not Progressing     Problem: Dementia Signs/Symptoms  Goal: Improved Mood Symptoms (Dementia Signs/Symptoms)  Outcome: Not Progressing         VS:  Temp: 97.9  F (36.6  C) Temp src: Oral BP: (!) 144/86 Pulse: 84   Resp: 18 SpO2: 97 % O2 Device: None (Room air)     O2: SpO2 > 97 and stable on RA. LS clear and equal bilaterally. Denies chest pain and SOB.    Output: incontinent   Last BM: Fecal incontinence, denies abdominal discomfort. BS active / passing flatus.    Activity:  On bed    SBA  / gait belt and walker   Skin: WDL    Pain:  0/10   CMS:  AOx1   Dressing:  none   Diet: Regular diet. Denies nausea/vomiting.   Pre breakfast at 132   LDA: L PIV SL    Equipment: IV pole, personal belongings,    Plan: standard precautions maintained / Continue with plan of care. Call light within reach  Plan: IV antibiotic   Additional Info:  Gave the medications one by one     1319 updated the therapists reg the appointment       1322 sent a message to Dr ALVES, daughter would like to check with the doctor

## 2024-08-27 NOTE — PROGRESS NOTES
08/27/24 1608   Appointment Info   Signing Clinician's Name / Credentials (PT) Catherine Alejandro, JONES       Present no   Language English   Living Environment   People in Home child(tiffany), adult   Current Living Arrangements apartment   Home Accessibility no concerns   Transportation Anticipated family or friend will provide   Self-Care   Usual Activity Tolerance moderate   Current Activity Tolerance fair   Equipment Currently Used at Home walker, rolling;wheelchair, manual;shower chair   Fall history within last six months no   Activity/Exercise/Self-Care Comment Pt uses FWW for ambulation within the apartment and manual w/c outside of the apartment.  Pt daughter is her PCA and assist with pt with ADLs needed.  Pt daughter reported pt mainly needs oversight and prompting for ADLs.   General Information   Onset of Illness/Injury or Date of Surgery 08/23/24   Referring Physician Oziel Boucher MD   Patient/Family Therapy Goals Statement (PT) Goal not verbalized   Pertinent History of Current Problem (include personal factors and/or comorbidities that impact the POC) Pt is a 64 year old female admitted on 8/23/2024. She has a past medical history of dementia, DM I, multiple CVA, seizures, HTN, HLD, CKD, hypothyroidism,UTIs and depression.   Pt admitted to 6 med surg for UTI   Existing Precautions/Restrictions fall   General Observations Pt in bathroom at start of PT evaluation with daughter assisting.   Cognition   Affect/Mental Status (Cognition) flat/blunted affect   Follows Commands (Cognition) over 90% accuracy   Integumentary/Edema   Integumentary/Edema no deficits were identifed   Posture    Posture Forward head position;Protracted shoulders   Range of Motion (ROM)   Range of Motion ROM is WFL   Strength (Manual Muscle Testing)   Strength (Manual Muscle Testing) Deficits observed during functional mobility   Bed Mobility   Comment, (Bed Mobility) Bed mobility not observed.   Transfers    Comment, (Transfers) STS transfers from commode, couch and bed with FWW and CGA to SBA x 1, cues for hand placement.   Gait/Stairs (Locomotion)   Comment, (Gait/Stairs) Pt ambulated 75' x 2 with FWW and CGA to SBA x 1.  Pt demonstrated slow but fairly steady gait.  Pt cued for upright posture and to take bigger steps.   Balance   Balance Comments Pt demonstrated good sitting balance.  Pt was able to bend forward to maria l brief without any LOB.  Pt demonstrated fair standing balance when washing hands and brushing her teeth at the sink.   Sensory Examination   Sensory Perception Comments Not tested.   Clinical Impression   Criteria for Skilled Therapeutic Intervention Evaluation only   Clinical Presentation (PT Evaluation Complexity) stable   Clinical Presentation Rationale Per clincial judgement   Clinical Decision Making (Complexity) low complexity   Risk & Benefits of therapy have been explained evaluation/treatment results reviewed;patient;daughter   Clinical Impression Comments Pt reported that her mom is close to baseline for her mobility and does not have any concerns about her discharging to home.   PT Total Evaluation Time   PT Eval, Low Complexity Minutes (00264) 25

## 2024-08-27 NOTE — PROGRESS NOTES
[facility]    Medicine Progress Note - Hospitalist Service, GOLD TEAM 19    Date of Admission:  2024    Assessment & Plan    Isabell Matthews is a 64 year old female admitted on 2024. She has a past medical history of dementia, DM I, multiple CVA, seizures, HTN, HLD, CKD, hypothyroidism,UTIs and depression. She presents to the ED with symptoms of UTI.    New Today   - Continue Abx   - PT consult for eval     # UTI  # Candida albicans cystitis  Daughter reports increased confusion which is usually a symptoms of UTI. In the ED, WBC was 6.9, UA with large LE and WBC, negative nitrite. She was started on rocephin  - Continue Rocephin for now  - Add fluconazole  - Follow urine culture  - Monitor CBC     # T1DM  # Polyneuropathy  Blood sugars elevated 350-390 on admit, ketones 0.24. She received 18 units lantus in ED  - Decrease Lantus to 14 units daily in the setting of decreased oral intake  - Medium dose correction scale Novolog  - Hypoglycemic protocol  - PTA gabapentin      # Dementia  # Multiple strokes: right basal ganglia ischemic infarct in 2018, left basal ganglia hemorrhagic stroke , right centrum semiovale & right chuck , and  acute/subacute right insular and right frontal operculum infarct 24  # Seizures   She has baseline dysarthria and dysphagia. Uses a walker due to gait instability. Admitted 24-24 after coming in with left facial droop and right sided weakness and found to have a acute/subacute right insular and right frontal operculum infarct. She was started in asa and plavix . CT of head negative for acute changes tonight.  - Aspirin 81 mg and clopidogrel 75 mg daily for 90 days followed by aspirin 81 mg daily indefinitely starting 24  - PTA Keppra  - Up with assist and walker  - Fall precaution     # EVON-on-CKD  Creatinine usually 1.5-1.68 and it was 1.87 on admit and GFR 29. Previous GFR was 42. Daughter noticed   "Po intake. She has been drinking well in the ED  - Encourage PO intake  - Monitor BMP     # HTN  # HLD  Echo 6/17/24 shows normal LV function with an EF of 60-65%. Cardiac event monitors in the past due to frequent CVA, none showing atrial fib/flutter  - PTA Coreg 12.5 mg twice daily and amlodipine 5 mg daily      # Hypothyroidism  - PTA Synthroid     # Depression  Related to dementia and started on duloxetine  - PTA duloxetine             Diet: Soft & Bite Sized Diet (level 6) Thin Liquids (level 0)    DVT Prophylaxis: Pneumatic Compression Devices  Parker Catheter: Not present  Lines: None     Cardiac Monitoring: None  Code Status: No CPR- Do NOT Intubate      Clinically Significant Risk Factors                  # Hypertension: Noted on problem list    # Dementia: noted on problem list            # Financial/Environmental Concerns: none               Disposition Plan     Medically Ready for Discharge: Anticipated in 2-4 Days             Oziel Boucher MD  Hospitalist Service, GOLD TEAM 19  [facility]  Securely message with [employer] (more info)  Text page via [employer] Paging/Directory   See signed in provider for up to date coverage information  ______________________________________________________________________    Interval History   Patient seen and examined this morning. No acute events overnight.   She states she has \"upset stomach\". Denies, nausea, vomiting, or diarrhea.     Physical Exam   Vital Signs: Temp: 97.9  F (36.6  C) Temp src: Oral BP: (!) 144/86 Pulse: 84   Resp: 18 SpO2: 97 % O2 Device: None (Room air)    Weight: 0 lbs 0 oz    General Appearance: Appears comfortable.  Respiratory: Without wheezes rhonchi or rales.  CTA  Cardiovascular: RRR, without murmurs  GI: Soft, nontender, plus BS  Skin: Without obvious bleeding, bruising or excoriations      Medical Decision Making       59 MINUTES SPENT BY ME on the date of service doing chart review, history, " exam, documentation & further activities per the note.      Data   ------------------------- PAST 24 HR DATA REVIEWED -----------------------------------------------

## 2024-08-27 NOTE — PLAN OF CARE
"Goal Outcome Evaluation:    Shift [id number]       VS: BP (!) 165/93 (BP Location: Left arm)   Pulse 87   Temp 97.9  F (36.6  C) (Oral)   Resp 20   SpO2 95%      O2: ON RA, denies chest pain or SOB   Output: Incont of bladder   Last BM: Last BM 8/26. BS active x4   Activity: Assist x1 with GB and walker   Skin: WDL       CMS: A&OX 2   Dressing: none   Diet: Soft and bite sized thin liquid, meds whole   LDA: L PIV   Equipment: IV pole and personal belonging       Additional Info: Pt no longer has a sitter. Patient is pleasant and cooperative with care. Bed in low position. Bed alarm on. Able to be redirected.                        Problem: Adult Inpatient Plan of Care  Goal: Plan of Care Review  Description: The Plan of Care Review/Shift note should be completed every shift.  The Outcome Evaluation is a brief statement about your assessment that the patient is improving, declining, or no change.  This information will be displayed automatically on your shift  note.  Outcome: Progressing  Goal: Patient-Specific Goal (Individualized)  Description: You can add care plan individualizations to a care plan. Examples of Individualization might be:  \"Parent requests to be called daily at 9am for status\", \"I have a hard time hearing out of my right ear\", or \"Do not touch me to wake me up as it startles  me\".  Outcome: Progressing  Goal: Absence of Hospital-Acquired Illness or Injury  Outcome: Progressing  Intervention: Identify and Manage Fall Risk  Recent Flowsheet Documentation  Taken 8/27/2024 0111 by Bk Velasquez RN  Safety Promotion/Fall Prevention:   activity supervised   clutter free environment maintained   increased rounding and observation   increase visualization of patient   lighting adjusted   nonskid shoes/slippers when out of bed  Intervention: Prevent Infection  Recent Flowsheet Documentation  Taken 8/27/2024 0111 by Bk Velasquez, RN  Infection Prevention: hand hygiene promoted  Goal: Optimal Comfort and " Wellbeing  Outcome: Progressing  Goal: Readiness for Transition of Care  Outcome: Progressing     Problem: UTI (Urinary Tract Infection)  Goal: Improved Infection Symptoms  Outcome: Progressing     Problem: Dementia Signs/Symptoms  Goal: Improved Behavioral Control (Dementia Signs/Symptoms)  Outcome: Progressing  Goal: Improved Mood Symptoms (Dementia Signs/Symptoms)  Outcome: Progressing  Goal: Optimized Cognitive Function (Dementia Signs/Symptoms)  Outcome: Progressing  Goal: Optimized Oral Intake (Dementia Signs/Symptoms)  Outcome: Progressing  Goal: Improved Sleep (Dementia Signs/Symptoms)  Outcome: Progressing  Goal: Enhanced Social or Functional Skills and Ability (Dementia Signs/Symptoms)  Outcome: Progressing

## 2024-08-27 NOTE — PROGRESS NOTES
08/27/24 1608   Appointment Info   Signing Clinician's Name / Credentials (PT) Catherine Alejandro, JONES       Present no   Language English   Living Environment   People in Home child(tiffany), adult   Current Living Arrangements apartment   Home Accessibility no concerns   Transportation Anticipated family or friend will provide   Self-Care   Usual Activity Tolerance moderate   Current Activity Tolerance fair   Equipment Currently Used at Home walker, rolling;wheelchair, manual;shower chair   Fall history within last six months no   Activity/Exercise/Self-Care Comment Pt uses FWW for ambulation within the apartment and manual w/c outside of the apartment.  Pt daughter is her PCA and assist with pt with ADLs needed.  Pt daughter reported pt mainly needs oversight and prompting for ADLs.   General Information   Onset of Illness/Injury or Date of Surgery 08/23/24   Referring Physician Oziel Boucher MD   Patient/Family Therapy Goals Statement (PT) Goal not verbalized   Pertinent History of Current Problem (include personal factors and/or comorbidities that impact the POC) Pt is a 64 year old female admitted on 8/23/2024. She has a past medical history of dementia, DM I, multiple CVA, seizures, HTN, HLD, CKD, hypothyroidism,UTIs and depression.   Pt admitted to 6 med surg for UTI   Existing Precautions/Restrictions fall   General Observations Pt in bathroom at start of PT evaluation with daughter assisting.   Cognition   Affect/Mental Status (Cognition) flat/blunted affect   Follows Commands (Cognition) over 90% accuracy   Integumentary/Edema   Integumentary/Edema no deficits were identifed   Posture    Posture Forward head position;Protracted shoulders   Range of Motion (ROM)   Range of Motion ROM is WFL   Strength (Manual Muscle Testing)   Strength (Manual Muscle Testing) Deficits observed during functional mobility   Bed Mobility   Comment, (Bed Mobility) Bed mobility not observed.   Transfers    Comment, (Transfers) STS transfers from commode, couch and bed with FWW and CGA to SBA x 1, cues for hand placement.   Gait/Stairs (Locomotion)   Comment, (Gait/Stairs) Pt ambulated 75' x 2 with FWW and CGA to SBA x 1.  Pt demonstrated slow but fairly steady gait.  Pt cued for upright posture and to take bigger steps.   Balance   Balance Comments Pt demonstrated good sitting balance.  Pt was able to bend forward to maria l brief without any LOB.  Pt demonstrated fair standing balance when washing hands and brushing her teeth at the sink.   Sensory Examination   Sensory Perception Comments Not tested.   Clinical Impression   Criteria for Skilled Therapeutic Intervention Evaluation only   Clinical Presentation (PT Evaluation Complexity) stable   Clinical Presentation Rationale Per clincial judgement   Clinical Decision Making (Complexity) low complexity   Risk & Benefits of therapy have been explained evaluation/treatment results reviewed;patient;daughter   Clinical Impression Comments Pt reported that her mom is close to baseline for her mobility and does not have any concerns about her discharging to home.   PT Total Evaluation Time   PT Eval, Low Complexity Minutes (43986) 25   PT Discharge Planning   PT Discharge Recommendation (DC Rec) home with assist   PT Rationale for DC Rec Pt is close to baseline for mobility and has good assist at home.  Pt daughter who is patients PCA present for session and does not have any concerns about her mom discharging to home.   PT Brief overview of current status Pt is assist of 1 with FWW   Total Session Time   Total Session Time (sum of timed and untimed services) 25

## 2024-08-27 NOTE — PROGRESS NOTES
"CLINICAL NUTRITION SERVICES - BRIEF NOTE      Reason for RD note:   RD referral received per Malnutrition Screening Tool--wt loss \"unsure\", decreased po intake.  New Findings/Chart Review:  No weight done this admission to  if any wt loss.  Called daughter to clarify if she suspected any recent weight loss.  Mel didn't believe she lost weight and things she looks similar to her last clinic appointment on 7/8.  Poor intake was x 3 days PTA.  Mel is ordering her meals and trying to keep them lower in carbs (~45 g/meal based on meal she described) as current insulin orders are just for correction insulin and not carb coverage which she feels is allowing higher blood sugars.  She notes that she usually eats 2-3 meals/day.  She occasionally doesn't want to eat much breakfast but she gives her a Premiere protein shake (160 kcal, 30 g protein and 6 g CHO) most days with breakfast. Pt appeared to have a better appetite today and had 75% of breakfast.      Interventions:  Will defer full assessment as only 1 point for Malnutrition Screening Tool for decreased po intake and appears to be improving now.  Education--explained option of adding a consistent carb diet (such as 45 g CHO/meal) if she'd like this.  She declined as she is able to limit it herself and says the diet still allows her to have juice and sweets.  Explained that we have Ensure Max Protein here which is very similar to Premiere Protein and could be ordered for Isabell if she'd like. Mel thought her mom would prefer the vanilla Ensure Max Protein.      Future/Additional Recommendations:     Nutrition will continue to follow per protocol.    Key Eckert RD, LD   6 & 8 Med/Surg RD  Mon-Fri Vocera contact: By name, or \"6 [OR] 8 Med Surg Clinical Dietitian\"  Weekend RD Vocera: \"Weekend Holiday Clinical Dietitian\"        "

## 2024-08-27 NOTE — PLAN OF CARE
DATE OF SERVICE:  11/17/2020



SUBJECTIVE:  Ms. Sanches is a 63-year-old  female, who was admitted for

COVID-19 pneumonia, currently in acute respiratory failure, recently status post

tracheostomy, status post PEG tube placement, status post tunneled dialysis catheter

placement.  She is currently undergoing hemodialysis.  She remains unimproved from a

renal point of view. 



OBJECTIVE:  VITAL SIGNS:  Blood pressure is 170/65, heart rate 80, O2 saturation is

95%. 

GENERAL:  The patient is minimally responsive, on vent support. 

SKIN:  Adequate turgor. 

HEENT:  She has a slightly pale conjunctivae.  Anicteric sclerae.  No neck mass.  No

carotid bruits.  No JVD. 

CHEST:  No deformities. 

LUNGS:  Decreased breath sounds. 

HEART:  Normal sinus rhythm.  No murmur.  No gallops.  No rubs. 

ABDOMEN:  Globular, soft, nontender.  No masses. 

EXTREMITIES:  No edema.  No deformities.



MEDICATIONS:  Medications of November 17, 2020 were reviewed.



LABORATORY DATA:  Laboratories of November 17, 2020; white count 18.7, hemoglobin

10.  Sodium 136, potassium 2.8, chloride 100, carbon dioxide 19, BUN 93, creatinine

2.62, glucose 291, calcium 6.6. 



ASSESSMENT AND PLAN:  

1. Hypokalemia, p.r.n. potassium replacement.  In addition, we will be adjusting

potassium bath in the dialysis. 

2. Acute kidney injury/chronic renal failure.  Continuing hemodialysis regimen.  I

have placed this patient on 3 times a week hemodialysis.  We will plan to do 3-hour 

dialysis treatment with fluid removal.

3. Anemia.  P.r.n. blood transfusion.  In addition, we will be rechecking another

CBC, 

basic metabolic tomorrow. 

Overall prognosis remains guarded.







Job ID:  [id number] Occupational Therapy: Orders received. Chart reviewed and discussed with care team.? Occupational Therapy not indicated due to as pt is near baseline for ADL and mobility. Pt lives with her daughter who assist with all ADL/IADL at home. Pt has 24/7 supervision. Per PT, dtr with no concerns with discharge home, have all supports in place.? Defer discharge recommendations to physical therapy, medical team.? Will complete orders.

## 2024-08-27 NOTE — PROGRESS NOTES
"Speech-Language Pathology: Clinical Swallow Evaluation        08/26/24 1600   Appointment Info   Signing Clinician's Name / Credentials (SLP) Adriana Greer MA, CCC-SLP   General Information   Onset of Illness/Injury or Date of Surgery 08/23/24   Referring Physician Matt Emanuel, DO   Pertinent History of Current Problem Per physician progress notes, \"Isabell Matthews is a 64 year old female admitted on 8/23/2024. She has a past medical history of dementia, DM I, multiple CVA, seizures, HTN, HLD, CKD, hypothyroidism,UTIs and depression. She presents to the ED with symptoms of UTI.\"   General Observations Patient alert and cooperative. Oriented to self and place. Occasional confused/nonsensical statements noted.   Type of Evaluation   Type of Evaluation Swallow Evaluation   Oral Motor   Oral Musculature generally intact   Mucosal Quality adequate   Dentition (Oral Motor)   Comment, Dentition (Oral Motor) Patient reports that two of her front teeth are sensitive to cold and some of her other teeth are painful.   Dentition (Oral Motor) some missing teeth   Facial Symmetry (Oral Motor)   Facial Symmetry (Oral Motor) WNL   Lip Function (Oral Motor)   Lip Range of Motion (Oral Motor) WNL   Lip Strength (Oral Motor) WNL   Tongue Function (Oral Motor)   Tongue Strength (Oral Motor) WNL   Tongue Coordination/Speed (Oral Motor) WNL   Tongue ROM (Oral Motor) WNL   Jaw Function (Oral Motor)   Jaw Function (Oral Motor) WNL   Cough/Swallow/Gag Reflex (Oral Motor)   Volitional Throat Clear/Cough (Oral Motor) WNL   Volitional Swallow (Oral Motor) WNL   Vocal Quality/Secretion Management (Oral Motor)   Vocal Quality (Oral Motor) WNL   Secretion Management (Oral Motor) WNL   General Swallowing Observations   Past History of Dysphagia Pt is well known to our SLP services and per previous assessment (6/17/24) had been on a diet of Soft & Bite Sized textures (IDDSI 6) and thin liquids at time of discharge.   Respiratory Support room air "   Current Diet/Method of Nutritional Intake (General Swallowing Observations, NIS) easy to chew (level 7)  (Mechanical/Dental Soft, IDDSI equivalent of Easy to Chew textures)   Swallowing Evaluation Clinical swallow evaluation   Clinical Swallow Evaluation   Feeding Assistance minimal assistance required   Clinical Swallow Evaluation Textures Trialed thin liquids;pureed;solid foods   Clinical Swallow Eval: Thin Liquid Texture Trial   Mode of Presentation, Thin Liquids cup;straw;self-fed   Volume of Liquid or Food Presented >4 ounces   Oral Phase of Swallow other (see comments)  (Bolus holding)   Pharyngeal Phase of Swallow intact  (No overt s/sx of aspiration)   Diagnostic Statement No overt clinical s/sx of aspiration observed. Moderately prolonged bolus holding noted. Patient reports that this is baseline for her. She states that this is to warm the liquid as her teeth are sensitive to cold.   Clinical Swallow Evaluation: Puree Solid Texture Trial   Mode of Presentation, Puree spoon;self-fed   Volume of Puree Presented 4 bites   Oral Phase, Puree other (see comments)  (Bolus holding)   Pharyngeal Phase, Puree intact  (No overt s/sx of aspiration)   Diagnostic Statement No overt clinical s/sx of aspiration. Brief bolus holding noted. Again, patient reports that this is to warm the food (applesauce) as her teeth are sensitive to cold.   Clinical Swallow Evaluation: Solid Food Texture Trial   Mode of Presentation self-fed   Volume Presented 4 bites   Oral Phase WFL   Pharyngeal Phase intact  (No overt s/sx of aspiration)   Diagnostic Statement Mildly prolonged mastication noted. A-P bolus transit and oral clearance were WNL. No overt clinical s/sx of aspiration observed.   Esophageal Phase of Swallow   Patient reports or presents with symptoms of esophageal dysphagia No   Swallowing Recommendations   Diet Consistency Recommendations soft & bite-sized (level 6);thin liquids (level 0)   Supervision Level for Intake  close supervision needed   Mode of Delivery Recommendations bolus size, small;slow rate of intake   Swallowing Maneuver Recommendations alternate food and liquid intake   Monitoring/Assistance Required (Eating/Swallowing) stop eating activities when fatigue is present   Recommended Feeding/Eating Techniques (Swallow Eval) maintain upright sitting position for eating;minimize distractions during oral intake;set-up and prepare tray   Medication Administration Recommendations, Swallowing (SLP) Pills to be given 1-2 at a time (whole), mixed in applesauce   Instrumental Assessment Recommendations instrumental evaluation not recommended at this time   General Therapy Interventions   Planned Therapy Interventions Dysphagia Treatment   Dysphagia treatment Modified diet education;Instruction of safe swallow strategies   Clinical Impression   Criteria for Skilled Therapeutic Interventions Met (SLP Eval) Yes, treatment indicated   SLP Diagnosis Mild oral dysphagia   Risks & Benefits of therapy have been explained evaluation/treatment results reviewed;care plan/treatment goals reviewed;risks/benefits reviewed;current/potential barriers reviewed;participants voiced agreement with care plan;participants included;patient   Clinical Impression Comments Clinical swallow evaluation completed per MD order. Patient presents with mild oral dysphagia. This appears primarily related to painful and sensitive teeth. There was no direct evidence of pharyngeal dysphagia during this evaluation. Oral phase was notable for bolus holding with thin liquid and puree consistency. Patient reports that she does this at baseline in an effort to warm up the bolus as her teeth are sensitive to cold. At this time, recommend diet of Soft & Bite Sized textures (IDDSI 6) and thin liquids. Speech Therapy will follow for short course to monitor diet tolerance and further train safe swallowing strategies as needed.   SLP Total Evaluation Time   Eval:  oral/pharyngeal swallow function, clinical swallow Minutes (85994) 15   Interventions   Interventions Quick Adds Swallowing Dysfunction   SLP Discharge Planning   SLP Plan F/U re: diet tolerance. Trial Easy to Chew textures as appropriate. Reinforce safe swallowing strategies.   SLP Discharge Recommendation home with assist   SLP Rationale for DC Rec Anticipate that patient will meet Speech Therapy goals prior to d/c.   SLP Brief overview of current status  Recommend Soft & Bite Sized food textures (IDDSI 6) and thin liquids. Pills to be given one at a time (whole), mixed in applesauce or pudding.   Total Session Time   Total Session Time (sum of timed and untimed services) 15

## 2024-08-28 VITALS
HEART RATE: 83 BPM | OXYGEN SATURATION: 100 % | TEMPERATURE: 97.5 F | DIASTOLIC BLOOD PRESSURE: 80 MMHG | SYSTOLIC BLOOD PRESSURE: 120 MMHG | RESPIRATION RATE: 20 BRPM

## 2024-08-28 LAB
ANION GAP SERPL CALCULATED.3IONS-SCNC: 10 MMOL/L (ref 7–15)
BASOPHILS # BLD AUTO: 0.1 10E3/UL (ref 0–0.2)
BASOPHILS NFR BLD AUTO: 1 %
BUN SERPL-MCNC: 25.9 MG/DL (ref 8–23)
CALCIUM SERPL-MCNC: 9.6 MG/DL (ref 8.8–10.4)
CHLORIDE SERPL-SCNC: 107 MMOL/L (ref 98–107)
CREAT SERPL-MCNC: 1.57 MG/DL (ref 0.51–0.95)
EGFRCR SERPLBLD CKD-EPI 2021: 36 ML/MIN/1.73M2
EOSINOPHIL # BLD AUTO: 0.2 10E3/UL (ref 0–0.7)
EOSINOPHIL NFR BLD AUTO: 3 %
ERYTHROCYTE [DISTWIDTH] IN BLOOD BY AUTOMATED COUNT: 14.7 % (ref 10–15)
GLUCOSE BLDC GLUCOMTR-MCNC: 163 MG/DL (ref 70–99)
GLUCOSE BLDC GLUCOMTR-MCNC: 193 MG/DL (ref 70–99)
GLUCOSE BLDC GLUCOMTR-MCNC: 224 MG/DL (ref 70–99)
GLUCOSE SERPL-MCNC: 157 MG/DL (ref 70–99)
HCO3 SERPL-SCNC: 24 MMOL/L (ref 22–29)
HCT VFR BLD AUTO: 38.8 % (ref 35–47)
HGB BLD-MCNC: 12.5 G/DL (ref 11.7–15.7)
IMM GRANULOCYTES # BLD: 0 10E3/UL
IMM GRANULOCYTES NFR BLD: 0 %
LYMPHOCYTES # BLD AUTO: 2.1 10E3/UL (ref 0.8–5.3)
LYMPHOCYTES NFR BLD AUTO: 33 %
MCH RBC QN AUTO: 28.3 PG (ref 26.5–33)
MCHC RBC AUTO-ENTMCNC: 32.2 G/DL (ref 31.5–36.5)
MCV RBC AUTO: 88 FL (ref 78–100)
MONOCYTES # BLD AUTO: 0.4 10E3/UL (ref 0–1.3)
MONOCYTES NFR BLD AUTO: 6 %
NEUTROPHILS # BLD AUTO: 3.6 10E3/UL (ref 1.6–8.3)
NEUTROPHILS NFR BLD AUTO: 57 %
NRBC # BLD AUTO: 0 10E3/UL
NRBC BLD AUTO-RTO: 0 /100
PLATELET # BLD AUTO: 207 10E3/UL (ref 150–450)
POTASSIUM SERPL-SCNC: 4 MMOL/L (ref 3.4–5.3)
RBC # BLD AUTO: 4.41 10E6/UL (ref 3.8–5.2)
SODIUM SERPL-SCNC: 141 MMOL/L (ref 135–145)
WBC # BLD AUTO: 6.4 10E3/UL (ref 4–11)

## 2024-08-28 PROCEDURE — 250N000013 HC RX MED GY IP 250 OP 250 PS 637: Performed by: NURSE PRACTITIONER

## 2024-08-28 PROCEDURE — 250N000013 HC RX MED GY IP 250 OP 250 PS 637: Performed by: HOSPITALIST

## 2024-08-28 PROCEDURE — 99239 HOSP IP/OBS DSCHRG MGMT >30: CPT | Performed by: STUDENT IN AN ORGANIZED HEALTH CARE EDUCATION/TRAINING PROGRAM

## 2024-08-28 PROCEDURE — 250N000013 HC RX MED GY IP 250 OP 250 PS 637: Performed by: STUDENT IN AN ORGANIZED HEALTH CARE EDUCATION/TRAINING PROGRAM

## 2024-08-28 PROCEDURE — 80048 BASIC METABOLIC PNL TOTAL CA: CPT | Performed by: STUDENT IN AN ORGANIZED HEALTH CARE EDUCATION/TRAINING PROGRAM

## 2024-08-28 PROCEDURE — 85004 AUTOMATED DIFF WBC COUNT: CPT | Performed by: STUDENT IN AN ORGANIZED HEALTH CARE EDUCATION/TRAINING PROGRAM

## 2024-08-28 PROCEDURE — 250N000013 HC RX MED GY IP 250 OP 250 PS 637: Performed by: INTERNAL MEDICINE

## 2024-08-28 PROCEDURE — 36415 COLL VENOUS BLD VENIPUNCTURE: CPT | Performed by: STUDENT IN AN ORGANIZED HEALTH CARE EDUCATION/TRAINING PROGRAM

## 2024-08-28 RX ORDER — FLUCONAZOLE 100 MG/1
100 TABLET ORAL DAILY
Qty: 7 TABLET | Refills: 0 | Status: SHIPPED | OUTPATIENT
Start: 2024-08-29 | End: 2024-09-05

## 2024-08-28 RX ORDER — CEFDINIR 300 MG/1
300 CAPSULE ORAL EVERY 12 HOURS SCHEDULED
Status: DISCONTINUED | OUTPATIENT
Start: 2024-08-28 | End: 2024-08-28 | Stop reason: HOSPADM

## 2024-08-28 RX ORDER — CEFDINIR 300 MG/1
300 CAPSULE ORAL 2 TIMES DAILY
Qty: 2 CAPSULE | Refills: 0 | Status: SHIPPED | OUTPATIENT
Start: 2024-08-28 | End: 2024-08-29

## 2024-08-28 RX ADMIN — CLOPIDOGREL BISULFATE 75 MG: 75 TABLET ORAL at 09:26

## 2024-08-28 RX ADMIN — NICOTINE 1 PATCH: 7 PATCH, EXTENDED RELEASE TRANSDERMAL at 09:27

## 2024-08-28 RX ADMIN — LEVOTHYROXINE SODIUM 75 MCG: 25 TABLET ORAL at 09:25

## 2024-08-28 RX ADMIN — CEFDINIR 300 MG: 300 CAPSULE ORAL at 12:32

## 2024-08-28 RX ADMIN — LEVETIRACETAM 500 MG: 500 TABLET, FILM COATED ORAL at 09:26

## 2024-08-28 RX ADMIN — DULOXETINE HYDROCHLORIDE 20 MG: 20 CAPSULE, DELAYED RELEASE ORAL at 09:26

## 2024-08-28 RX ADMIN — FLUCONAZOLE 100 MG: 100 TABLET ORAL at 09:26

## 2024-08-28 RX ADMIN — INSULIN ASPART 2 UNITS: 100 INJECTION, SOLUTION INTRAVENOUS; SUBCUTANEOUS at 12:32

## 2024-08-28 RX ADMIN — ASPIRIN 81 MG CHEWABLE TABLET 81 MG: 81 TABLET CHEWABLE at 09:25

## 2024-08-28 RX ADMIN — CARVEDILOL 12.5 MG: 12.5 TABLET, FILM COATED ORAL at 09:25

## 2024-08-28 RX ADMIN — ATORVASTATIN CALCIUM 40 MG: 40 TABLET, FILM COATED ORAL at 09:25

## 2024-08-28 RX ADMIN — AMLODIPINE BESYLATE 5 MG: 5 TABLET ORAL at 09:26

## 2024-08-28 RX ADMIN — INSULIN ASPART 1 UNITS: 100 INJECTION, SOLUTION INTRAVENOUS; SUBCUTANEOUS at 06:33

## 2024-08-28 ASSESSMENT — ACTIVITIES OF DAILY LIVING (ADL)
ADLS_ACUITY_SCORE: 52

## 2024-08-28 NOTE — DISCHARGE SUMMARY
[facility]  Hospitalist Discharge Summary      Date of Admission:  8/23/2024  Date of Discharge:  8/28/2024  Discharging Provider: Oziel Boucher MD  Discharge Service: Hospitalist Service, GOLD TEAM 19    Discharge Diagnoses   # UTI  # Candida albicans cystitis  # T1DM  # Polyneuropathy  # Dementia  # Multiple strokes: right basal ganglia ischemic infarct in 2018, left basal ganglia hemorrhagic stroke 2021, right centrum semiovale & right chuck 2023, and  acute/subacute right insular and right frontal operculum infarct 6/16/24  # Seizures  Clinically Significant Risk Factors          Follow-ups Needed After Discharge     Unresulted Labs Ordered in the Past 30 Days of this Admission       No orders found from 7/24/2024 to 8/24/2024.          Discharge Disposition   Discharged to home  Condition at discharge: Stable    Hospital Course   Isabell Matthews is a 64 year old female admitted on 8/23/2024. She has a past medical history of dementia, DM I, multiple CVA, seizures, HTN, HLD, CKD, hypothyroidism,UTIs and depression. She presents to the ED with symptoms of UTI. Patient was started on IV ceftriaxone with good response. She has remained afebrile, her mentation and behavior is back to her normal baseline according to the care taker who happens to be her daughter. She will be discharged to complete 7 days of oral antibiotics and 10 day course of fluconazole.       # UTI  # Candida albicans cystitis  Daughter reports increased confusion which is usually a symptoms of UTI. In the ED, WBC was 6.9, UA with large LE and WBC, negative nitrite. She was started on rocephin  - Continue Rocephin for now  - Add fluconazole  - Follow urine culture  - Monitor CBC     # T1DM  # Polyneuropathy  Blood sugars elevated 350-390 on admit, ketones 0.24. She received 18 units lantus in ED  - Decrease Lantus to 14 units daily in the setting of decreased oral intake  - Medium dose correction  scale Novolog  - Hypoglycemic protocol  - PTA gabapentin      # Dementia  # Multiple strokes: right basal ganglia ischemic infarct in 2018, left basal ganglia hemorrhagic stroke , right centrum semiovale & right chuck , and  acute/subacute right insular and right frontal operculum infarct 24  # Seizures   She has baseline dysarthria and dysphagia. Uses a walker due to gait instability. Admitted 24-24 after coming in with left facial droop and right sided weakness and found to have a acute/subacute right insular and right frontal operculum infarct. She was started in asa and plavix . CT of head negative for acute changes tonight.  - Aspirin 81 mg and clopidogrel 75 mg daily for 90 days followed by aspirin 81 mg daily indefinitely starting 24  - PTA Keppra  - Up with assist and walker  - Fall precaution     # EVON-on-CKD  Creatinine usually 1.5-1.68 and it was 1.87 on admit and GFR 29. Previous GFR was 42. Daughter noticed  Po intake. She has been drinking well in the ED  - Encourage PO intake  - Monitor BMP     # HTN  # HLD  Echo 24 shows normal LV function with an EF of 60-65%. Cardiac event monitors in the past due to frequent CVA, none showing atrial fib/flutter  - PTA Coreg 12.5 mg twice daily and amlodipine 5 mg daily      # Hypothyroidism  - PTA Synthroid     # Depression  Related to dementia and started on duloxetine  - PTA duloxetine       Consultations This Hospital Stay   PHYSICAL THERAPY ADULT IP CONSULT  OCCUPATIONAL THERAPY ADULT IP CONSULT  SPEECH LANGUAGE PATH ADULT IP CONSULT  PHYSICAL THERAPY ADULT IP CONSULT    Code Status   No CPR- Do NOT Intubate    Time Spent on this Encounter   IOziel MD, personally saw the patient today and spent greater than 30 minutes discharging this patient.       Oziel Boucher MD  [facility] MED SURG  Critical access hospital[facility] [address]  Phone: [phone]  Fax:  [phone]  ______________________________________________________________________    Physical Exam   Vital Signs: Temp: 97.5  F (36.4  C) Temp src: Oral BP: 120/80 Pulse: 83   Resp: 20 SpO2: 100 % O2 Device: None (Room air)    Weight: 0 lbs 0 oz  General Appearance: Appears comfortable.  Respiratory: Without wheezes rhonchi or rales.  CTA  Cardiovascular: RRR, without murmurs  GI: Soft, nontender, plus BS  Skin: Without obvious bleeding, bruising or excoriations         Primary Care Physician   Bony Castaneda    Discharge Orders      Reason for your hospital stay    UTI, Confusion     Activity    Your activity upon discharge: activity as tolerated     Diet    Follow this diet upon discharge: Current Diet:Orders Placed This Encounter      Snacks/Supplements Adult: Ensure Max Protein (bariatric); With Meals      Soft & Bite Sized Diet (level 6) Thin Liquids (level 0)       Significant Results and Procedures   Results for orders placed or performed during the hospital encounter of 08/23/24   Head CT w/o contrast    Narrative    EXAM: CT HEAD W/O CONTRAST  LOCATION: [facility]  DATE: 8/23/2024    INDICATION: Lethargy, hx of CVA, eval for acute process.  COMPARISON: Brain MRI 6/16/2024.  TECHNIQUE: Routine CT Head without IV contrast. Multiplanar reformats. Dose reduction techniques were used.    FINDINGS:  INTRACRANIAL CONTENTS: No intracranial hemorrhage, extraaxial collection, or mass effect.  No CT evidence of acute infarct. Mild-to-moderate volume loss and presumed chronic small vessel ischemia. Evolutionary change of the now chronic right frontal   opercular infarct which was imaged in its acute phase on 6/16/2024. Chronic infarct is seen within the basal ganglia bilaterally.    VISUALIZED ORBITS/SINUSES/MASTOIDS: No intraorbital abnormality. No paranasal sinus mucosal disease. No middle ear or mastoid effusion.    BONES/SOFT TISSUES: No acute abnormality.       Impression    IMPRESSION:  1.  No acute intracranial abnormality.    2.  Interval evolutionary change of the now chronic right frontal opercular infarct which was imaged in its acute phase 6/16/2024.    3.  Age-related and chronic ischemic changes are otherwise stable.   Chest XR,  PA & LAT    Narrative    EXAM: XR CHEST 2 VIEWS  LOCATION: [facility]  DATE: 8/23/2024    INDICATION: lethargy, dementia, eval for infiltrate  COMPARISON: 9/6/2023.      Impression    IMPRESSION: Negative chest.       Discharge Medications   Current Discharge Medication List        START taking these medications    Details   cefdinir (OMNICEF) 300 MG capsule Take 1 capsule (300 mg) by mouth 2 times daily for 1 day.  Qty: 2 capsule, Refills: 0    Associated Diagnoses: Acute cystitis without hematuria      fluconazole (DIFLUCAN) 100 MG tablet Take 1 tablet (100 mg) by mouth daily for 7 days.  Qty: 7 tablet, Refills: 0    Associated Diagnoses: Acute cystitis without hematuria           CONTINUE these medications which have CHANGED    Details   insulin glargine (LANTUS PEN) 100 UNIT/ML pen Inject 14 Units subcutaneously at bedtime.  Qty: 15 mL, Refills: 1    Comments: If Lantus is not covered by insurance, may substitute Basaglar or Semglee or other insulin glargine product per insurance preference at same dose and frequency.    Associated Diagnoses: History of diabetes with ketoacidosis           CONTINUE these medications which have NOT CHANGED    Details   amLODIPine (NORVASC) 5 MG tablet Take 1 tablet (5 mg) by mouth daily  Qty: 90 tablet, Refills: 3    Associated Diagnoses: Essential hypertension      aspirin (ASA) 81 MG chewable tablet Take 1 tablet (81 mg) by mouth daily  Qty: 90 tablet, Refills: 3    Associated Diagnoses: Acute CVA (cerebrovascular accident) (H)      atorvastatin (LIPITOR) 40 MG tablet TAKE 1 TABLET(40 MG) BY MOUTH DAILY  Qty: 90 tablet, Refills: 3    Associated Diagnoses:  Hyperlipidemia LDL goal <70      blood glucose (TRUE METRIX BLOOD GLUCOSE TEST) test strip USE TO TEST BLOOD SUGAR 4 TO 5 TIMES DAILY OR AS DIRECTED  Qty: 400 strip, Refills: 3    Associated Diagnoses: Type 1 diabetes mellitus with other circulatory complication (H)      blood glucose monitoring (NO BRAND SPECIFIED) meter device kit Use to test blood sugar 4 times daily.  Please provide glucose meter that is covered by insurance.  Qty: 1 kit, Refills: 0    Associated Diagnoses: Type 1 diabetes mellitus with other circulatory complication (H); Type 1 diabetes mellitus with diabetic polyneuropathy (H)      carvedilol (COREG) 12.5 MG tablet Take 1 tablet (12.5 mg) by mouth 2 times daily (with meals)  Qty: 180 tablet, Refills: 3    Associated Diagnoses: Essential hypertension      clopidogrel (PLAVIX) 75 MG tablet Take 1 tablet (75 mg) by mouth daily for 89 days  Qty: 89 tablet, Refills: 0    Comments: stroke  Associated Diagnoses: Cerebrovascular accident (CVA), unspecified mechanism (H)      cyanocobalamin (VITAMIN B-12) 1000 MCG tablet Take 1 tablet (1,000 mcg) by mouth daily  Qty: 100 tablet, Refills: 3    Associated Diagnoses: Vitamin B12 deficiency (non anemic)      DULoxetine (CYMBALTA) 20 MG capsule Take 1 capsule (20 mg) by mouth daily  Qty: 90 capsule, Refills: 3    Associated Diagnoses: Fibromyalgia      gabapentin (NEURONTIN) 100 MG capsule Take 1 capsule (100 mg) by mouth at bedtime  Qty: 90 capsule, Refills: 3    Associated Diagnoses: Type 1 diabetes mellitus with other circulatory complication (H)      Glucagon (GVOKE HYPOPEN) 1 MG/0.2ML pen Inject the contents of 1 device under the skin into lower abdomen, outer thigh, or outer upper arm as needed for hypoglycemia. If no response after 15 minutes, additional 1 mg dose from a new device may be injected while waiting for emergency assistance.  Qty: 2 each, Refills: 0    Comments: Diabetes with hypoglycemia  Associated Diagnoses: Type 2 diabetes mellitus  with hypoglycemia without coma, with long-term current use of insulin (H)      insulin aspart (NOVOLOG FLEXPEN) 100 UNIT/ML pen Inject 2-4 units with meals plus correction scale 1 unit per 100 >200 three times daily before meals and at bedtime. For Pre-Meal  - 164 give 1 unit.  For Pre-Meal  - 189 give 2 units.  For Pre-Meal  - 214 give 3 units.  For Pre-Meal  - 239 give 4 units.  For Pre-Meal  - 264 give 5 units.  For Pre-Meal  - 289 give 6 units.  For Pre-Meal  - 314 give 7 units.  For Pre-Meal  - 339 give 8 units.  For Pre-Meal  - 364 give 9 units.  For Pre-Meal BG greater than or equal to 365 give 10 units. FOR BEDTIME:  For  - 224 give 1 unit.  For  - 249 give 2 units.  For  - 274 give 3 units.  For  - 299 give 4 units.  For  - 324 give 5 units.  For  - 349 give 6 units.  For BG greater than or equal to 350 give 7 units.  Qty: 15 mL, Refills: 4    Associated Diagnoses: Type 1 diabetes mellitus with other circulatory complication (H)      insulin aspart (NOVOLOG PENFILL) 100 UNIT/ML cartridge Inject 2-4 Units Subcutaneous 4 times daily (with meals and nightly) diabetes  Qty: 15 mL, Refills: 11    Associated Diagnoses: Type 2 diabetes mellitus with hypoglycemia without coma, with long-term current use of insulin (H)      insulin pen needle (31G X 8 MM) 31G X 8 MM miscellaneous Use 4 pen needles daily or as directed.  Qty: 300 each, Refills: 4    Associated Diagnoses: History of insulin dependent diabetes mellitus      levETIRAcetam (KEPPRA) 500 MG tablet Take 1 tablet (500 mg) by mouth 2 times daily  Qty: 180 tablet, Refills: 3    Associated Diagnoses: Seizures (H)      loratadine (CLARITIN) 10 MG tablet Take 1 tablet (10 mg) by mouth daily  Qty: 90 tablet, Refills: 3    Associated Diagnoses: Dermatitis      SYNTHROID 75 MCG tablet Take 1 tablet (75 mcg) by mouth daily  Qty: 90 tablet, Refills: 3    Associated Diagnoses:  Acquired hypothyroidism      zinc oxide (DESITIN) 20 % external ointment Apply topically as needed for dry skin or irritation  Qty: 85 g, Refills: 3    Associated Diagnoses: Fecal smearing           STOP taking these medications       cyclobenzaprine (FLEXERIL) 10 MG tablet Comments:   Reason for Stopping:         diclofenac (VOLTAREN) 1 % topical gel Comments:   Reason for Stopping:         METHYLCELLULOSE, LAXATIVE, PO Comments:   Reason for Stopping:         nystatin (MYCOSTATIN) 916289 UNIT/GM external cream Comments:   Reason for Stopping:         polyethylene glycol (MIRALAX) 17 GM/Dose powder Comments:   Reason for Stopping:         Vitamin D3 (CHOLECALCIFEROL) 125 MCG (5000 UT) tablet Comments:   Reason for Stopping:             Allergies   Allergies   Allergen Reactions    Seasonal Allergies

## 2024-08-28 NOTE — PLAN OF CARE
Goal Outcome Evaluation:      Plan of Care Reviewed With: patient    Overall Patient Progress: improvingOverall Patient Progress: improving    Outcome Evaluation: Pt alert to self, assist of 1 with walker and gait belt, incontinent bowel and bladder. Soft and bite size diet, thin liquids. took meds whole with water, declined applesauce or pudding. Denied pain.Eating with supervision only per speech recommendations. BG checks done insulin given per orders/ bed alarm on. PIV SL. Await transition to oral abx prior to discharge. Continue POC

## 2024-08-28 NOTE — PROGRESS NOTES
Care Management Discharge Note    Discharge Date: 08/28/2024       Discharge Disposition: Home    Discharge Services: PCA    Discharge DME: None    Discharge Transportation: family or friend will provide    Private pay costs discussed: Not applicable    Does the patient's insurance plan have a 3 day qualifying hospital stay waiver?  No    PAS Confirmation Code:    Patient/family educated on Medicare website which has current facility and service quality ratings:      Education Provided on the Discharge Plan:    Persons Notified of Discharge Plans: PCP  Patient/Family in Agreement with the Plan: yes    Handoff Referral Completed: Yes, [facility] PCP: Internal handoff referral completed    Additional Information:    Isabell has discharge orders.  Therapy recommendations are home with assist.  Per Care Management Assessment she has PCA at home.  She also has transportation home.  No Care Management needs.    Mahesh RICHARDSONW to give IMM.    Sugar Gracia RN, BA   8/28/2024  Nurse Coordinator , [facility]     Social Work and Care Management Department       SEARCHABLE in [facility] - search CARE COORDINATOR       [place] ([phone]) Saturday & Sunday; ([phone]) [facility] Recognized Holidays     Units: 5A Onc 5201 - 5219 RNCC,  5A Onc 5220 thru 5240 RNCC, 5C OFFSERVICE [id number] RNCC & 5C OFF SERVICE [id number] RNCC       Units: 6B Vocera, 6C Card 6401 thru 6420 RNCC, 6C Card 6502 thru 6514 RNCC & 6C Card 6515 thru 6519 RNCC        Units: 7A SOT RNCC Vocera, 7B Med Surg Vocera, 7C Med Surg 7401 thru 7418 RNCC & 7C Med Surg 7502 thru 7521 RNCC       Units: 6A Vocera & 4A CVICU Vocera, 4C MICU Vocera, and 4E SICU Vocera         Units: 5 Ortho Vocera & 5 Med Surg Vocera        Units: 6 Med Surg Vocera & 8 Med Surg Vocera

## 2024-08-28 NOTE — PROGRESS NOTES
6MS DISCHARGE    D: Patient discharged to home at 1500 Patient accompanied by daughter. Transport     I: Discharge prescriptions given to patient. All discharge medications and instructions reviewed with daughter who is care giver. Patient instructed to seek care if experiencing worsening symptoms, such as increased confusion, painful urination. Other phone numbers to call with questions or concerns after discharge reviewed. PIV removed. Education completed.    A: Patient daughter verbalized understanding of discharge medications and instructions. Prescribed home medications sent to patient home pharmacy   Belongings returned to patient.    P: Patient to follow-up on oct 7th with PCP .

## 2024-08-28 NOTE — PLAN OF CARE
"  Problem: Adult Inpatient Plan of Care  Goal: Plan of Care Review  Description: The Plan of Care Review/Shift note should be completed every shift.  The Outcome Evaluation is a brief statement about your assessment that the patient is improving, declining, or no change.  This information will be displayed automatically on your shift  note.  Outcome: Progressing  Flowsheets (Taken 8/28/2024 1158)  Outcome Evaluation: Pt is at baseline mentation. Assist of one with a gait belt and walker. incont of bladder. Remains on a bed alarm. no longer 1:1 patient needs to be anticipated. Plan if for oral ABX and patient ok for discharge.  Plan of Care Reviewed With: patient  Overall Patient Progress: improving  Goal: Patient-Specific Goal (Individualized)  Description: You can add care plan individualizations to a care plan. Examples of Individualization might be:  \"Parent requests to be called daily at 9am for status\", \"I have a hard time hearing out of my right ear\", or \"Do not touch me to wake me up as it startles  me\".  Outcome: Progressing  Goal: Absence of Hospital-Acquired Illness or Injury  Outcome: Progressing  Intervention: Identify and Manage Fall Risk  Recent Flowsheet Documentation  Taken 8/28/2024 0925 by Stephanie Wisdom, RN  Safety Promotion/Fall Prevention:   activity supervised   assistive device/personal items within reach   clutter free environment maintained   increased rounding and observation  Intervention: Prevent Skin Injury  Recent Flowsheet Documentation  Taken 8/28/2024 0925 by Stephanie Wisdom, RN  Body Position: position changed independently  Intervention: Prevent Infection  Recent Flowsheet Documentation  Taken 8/28/2024 0925 by Stephanie Wisdom, RN  Infection Prevention:   equipment surfaces disinfected   hand hygiene promoted   rest/sleep promoted   single patient room provided  Goal: Optimal Comfort and Wellbeing  Outcome: Progressing  Goal: Readiness for Transition of Care  Outcome: Progressing   " Goal Outcome Evaluation:      Plan of Care Reviewed With: patient    Overall Patient Progress: improvingOverall Patient Progress: improving    Outcome Evaluation: Pt is at baseline mentation. Assist of one with a gait belt and walker. incont of bladder. Remains on a bed alarm. no longer 1:1 patient needs to be anticipated. Plan if for oral ABX and patient ok for discharge.

## 2024-08-28 NOTE — PLAN OF CARE
Goal Outcome Evaluation:      Plan of Care Reviewed With: patient    Overall Patient Progress: improvingOverall Patient Progress: improving    Outcome Evaluation: Patient is alert to self. Needs assistance x1 with gait belt and walker. Incontinent of bowel and bladder. No BM this shift.     RA. Soft and bite size (L6) and thin liquid diet. Takes pill whole with water per patient's preference. Needs supervision when feeding. PIV on L AC, SL. Denies pain, SOB, chest pain, n/v and n/t.     Bed alarm on. Continue with POC.       Procedures   Foot Exam          Discussion/Summary   The patient was counseled regarding instructions for management,-- prognosis,-- patient and family education,-- risks and benefits of treatment options,-- importance of compliance with treatment     Patient is able to Self-Care     The treatment plan was reviewed with the patient/guardian  The patient/guardian understands and agrees with the treatment plan      Chief Complaint   Patient needs full diabetic foot exam       History of Present Illness   HPI: Patient presents for pedal evaluation  Patient complains of pain in his feet and toes with ambulation   Patient is a morbidly obese diabetic who has some electric sensations in his feet on occasion       Review of Systems        Constitutional: not feeling tired       Eyes: no eyesight problems       ENT: no nasal discharge       Cardiovascular: no chest pain,-- no palpitations-- and-- no extremity edema       Respiratory: no shortness of breath-- and-- no cough       Gastrointestinal: no abdominal pain-- and-- no constipation       Genitourinary: no dysuria-- and-- no urinary hesitancy       Integumentary: no skin wound       Neurological: no tingling-- and-- no dizziness       Psychiatric: sleeping better, stopped taking trazodone, but-- no sleep disturbances       Endocrine: no feelings of weakness       Active Problems   1  Acquired pes planus (734) (M21 40)   2  Acute renal failure (584 9) (N17 9)   3  Adhesive capsulitis of both shoulders (726 0) (M75 01,M75 02)   4  Arthralgia (719 40) (M25 50)   5  BPH without urinary obstruction (600 00) (N40 0)   6  Bunion (727 1) (M21 619)   7  Callus (700) (L84)   2  VEBCSHW systolic congestive heart failure (428 22,428 0) (I50 22)   9  Depression with anxiety (300 4) (F41 8)   10  Diabetes mellitus with neurological manifestation (250 60) (E11 49)   11  Diabetes type 2, uncontrolled (250 02) (E11 65)   12  Difficulty concentrating (799 51) (R41 840)   13  Difficulty walking (719 7) (R26 2)   14  Dyspnea on exertion (786 09) (R06 09)   15  Encounter for prostate cancer screening (V76 44) (Z12 5)   16  Encounter for screening for malignant neoplasm of colon (V76 51) (Z12 11)   17  Fatigue (780 79) (R53 83)   18  Foot pain, bilateral (729 5) (M79 671,M79 672)   19  Hallux valgus (735 0) (M20 10)   20  Hematuria (599 70) (R31 9)   21  Hyperlipidemia (272 4) (E78 5)   22  Hypertension (401 9) (I10)   23  Hypogonadism, male (257 2) (E29 1)   24  Insomnia (780 52) (G47 00)   25  Morbid or severe obesity due to excess calories (278 01) (E66 01)   26  Need for pneumococcal vaccination (V03 82) (Z23)   27  Need for prophylactic vaccination and inoculation against influenza (V04 81) (Z23)   28  Nephrolithiasis (592 0) (N20 0)   29  Onychomycosis (110 1) (B35 1)   30  JEFF (obstructive sleep apnea) (327 23) (G47 33)   31  Sciatica (724 3) (M54 30)   32  Screening for colon cancer (V76 51) (Z12 11)   33  Seasonal allergies (477 9) (J30 2)   34  Shoulder pain, right (719 41) (M25 511)   35  Spinal stenosis (724 00) (M48 00)   36  Tinea pedis (110 4) (B35 3)   37  Type 2 diabetes mellitus (250 00) (E11 9)   38  Vitamin D deficiency (268 9) (E55 9)     Past Medical History    · History of Cellulitis of left leg (682 6) (L03 116)   · History of chest pain (V13 89) (R79 210)   · History of diarrhea (V12 79) (D28 255)   · History of onychomycosis (V12 09) (Z86 19)   · History of onychomycosis (V12 09) (Z86 19)   · History of Limb pain (729 5) (M79 609)   · History of Neck pain (723 1) (M54 2)   · Need for pneumococcal vaccination (V03 82) (Z23)   · History of Nephrolithiasis (V13 01)   · History of Numbness On The Sole Of The Right Foot   · History of Pain and swelling of left lower leg (729 5,729 81) (M79 662,M79 89)   · History of Pain of lower extremity (729 5) (M79 606)   · History of Pain, hand joint, right (719 44) (M79 641)   · Sciatica (724 3) (M54 30)     The active problems and past medical history were reviewed and updated today       Surgical History    · History of Knee Arthroscopy (Therapeutic)   · History of Knee Surgery   · History of Needle Biopsy Of Prostate     Family History   Mother    · Family history of Alzheimer Disease   · Family history of Family Health Status Of Mother - Alive  Father    · Family history of Family Health Status Of Father -   Family History    · Family history of Adopted   · Denied: Family history of Colon Cancer   · Denied: Family history of Crohn's Disease   · Denied: Family history of Liver Cancer     Social History    · Denied: History of Alcohol Use (History)   · Current Some Day Smoker (305 1)   · Denied: History of Exercise Habits   · Marital History - Currently    · Tobacco use (305 1) (Z72 0)   · Working Full Time  The social history was reviewed and is unchanged       Current Meds    1  AmLODIPine Besylate 10 MG Oral Tablet; TAKE 1 TABLET EVERY MORNING;     Therapy: 25PKU8841 to (Page December)  Requested for: 75EJL3594; Last     Rx:28Qmr2882 Ordered   2  Aditya Breeze 2 Test In Vitro Disk; test blood sugar twice daiy;     Therapy: 47HAU4139 to (Dai Carlson)  Requested for: 97DHF6703; Last     Rx:67Wyt3705 Ordered   3  BuPROPion HCl ER (XL) 150 MG Oral Tablet Extended Release 24 Hour; take 1 tablet     by mouth every day;     Therapy: 18QTY5840 to (Evaluate:2018)  Requested for: 20BRC1688; Last     Rx:12Ewr5376 Ordered   4  CloNIDine HCl - 0 3 MG Oral Tablet; take 1 tablet twice a day;     Therapy: 00EQR5966 to (Page December)  Requested for: 03ZJJ2262; Last     Rx:34Yss3838 Ordered   5  Enalapril Maleate 20 MG Oral Tablet; take 1 tablet twice a day;     Therapy: 21UUM9564 to (Page December)  Requested for: 83IGW0755; Last     Rx:09Bqf7649 Ordered   6  Glimepiride 4 MG Oral Tablet;  Take 1 tablet daily;     Therapy: 58DQC8089 to (Page December)  Requested for: 46KXI9929; Last     Rx:98Esz3906 Ordered   7  Januvia 100 MG Oral Tablet; take 1 tablet by mouth daily;     Therapy: 98WPS8130 to (Evaluate:10Feb2018)  Requested for: 48Ctc2463; Last     Rx:31Zbp1112 Ordered   8  Jardiance 10 MG Oral Tablet; take 1 tablet by mouth every morning;     Therapy: 46BOI2099 to (Evaluate:05Mar2018)  Requested for: 75SJJ5712; Last     Rx:72Dcn8345 Ordered   9  Ketoconazole 2 % External Cream; APPLY A THIN LAYER TO AFFECTED AREA(S)     TWICE DAILY;     Therapy: 28Oct2016 to (Evaluate:26Jan2017)  Requested for: 28Oct2016; Last     Rx:31Nml5243 Ordered   10  Methylphenidate HCl - 10 MG Oral Tablet; Take 1 tablet twice daily;      Therapy: 52NJB3904 to (Evaluate:14Jan2018); Last Rx:72Jws3224 Ordered   11  NovoFine 32G X 6 MM Miscellaneous; USE AS DIRECTED;      Therapy: 46SAN9457 to (DYPHAPLE:48YXF8612)  Requested for: 79Csv4262; Last      Rx:99Arh1691 Ordered   12  OneTouch Verio In Vitro Strip; TEST TWICE DAILY;      Therapy: 56VIO7214 to (Evaluate:21Jun2016)  Requested for: 27GMP0150; Last      Rx:45Kah7443 Ordered   13  OneTouch Verio w/Device Kit; USE AS DIRECTED;      Therapy: 65TMK6047 to (Last Rx:24Nov2015)  Requested for: 24Nov2015 Ordered   14  Tamsulosin HCl - 0 4 MG Oral Capsule; take 1 capsule daily;      Therapy: 02VJP3192 to (Evaluate:05Mar2018)  Requested for: 21SKN0064; Last      Rx:43Gvg8126 Ordered   15  Testosterone Cypionate 200 MG/ML Intramuscular Solution; inject IM every 2 weeks;      Therapy: 09ZWP3899 to (Last Rx:44Bfq7098) Ordered   16  Vicodin 5-300 MG Oral Tablet; One by mouth  half hour prior to physical therapy;      Therapy: 00IYJ6356 to (Evaluate:18Jan2018);  Last Rx:29Nov2017 Ordered   17  Vitamin D (Ergocalciferol) 25629 UNIT Oral Capsule; TAKE ONE CAPSULE BY MOUTH      ONCE A WEEK;      Therapy: 68OGR9545 to (Bridgette )  Requested for: 56Nne5450; Last      Rx:84Jup1791 Ordered   18  Vitamin D3 2000 UNIT Oral Capsule; TAKE 1 CAPSULE Daily;      Therapy: 68URF2595 to (Last Rx:99Mwf0231)  Requested for: 28CPZ2462 Ordered     The medication list was reviewed and updated today       Allergies   1  No Known Drug Allergies     Vitals         Respiration 17   Height 5 ft 10 in   Weight 286 lb    BMI Calculated 41 04   BSA Calculated 2 43      Physical Exam   Left Foot: Appearance: Normal except as noted: excessive pronation-- and-- pes planus  Great toe deformities include a bunion  Tenderness: None except the great toe-- and-- distal first metatarsal     Right Foot: Appearance: Normal except as noted: excessive pronation-- and-- pes planus  Great toe deformities include a bunion  Tenderness: None except the great toe-- and-- distal first metatarsal     Left Ankle: ROM: limited ROM in all planes    Right Ankle: ROM: limited ROM in all planes    Neurological Exam: performed  Light touch was decreased bilaterally  Vibratory sensation was decreased in both first metatarsophalangeal joints     Vascular Exam: performed Dorsalis pedis pulses were present bilaterally  Posterior tibial pulses were present bilaterally  Elevation Pallor: absent bilaterally  Dependence rubor was absent bilaterally  Edema: none     Toenails: All of the toenails were elongated,-- hypertrophied,-- discolored-- and-- Ptotic  Both first toenails were tender-- and-- Positive onychogryphosis          Socks and shoes removed, Right Foot Findings: swollen, erythematous and dry       The sensory exam showed diminished vibratory sensation at the level of the toes  Diminished tactile sensation with monofilament testing throughout the right foot       Socks and shoes removed, Left Foot Findings: swollen, erythematous and dry       The sensory exam showed diminished vibratory sensation at the level of the toes  Diminished tactile sensation with monofilament testing throughout the left foot      Capillary refills findings on the right were normal in the toes        Pulses:      2+ in the posterior tibialis on the right      2+ in the dorsalis pedis on the right       Capillary refills findings on the left were normal in the toes       Pulses:      1+ in the posterior tibialis on the left      1+ in the dorsalis pedis on the left       Assign Risk Category: 2: Loss of protective sensation with or without weakness, deformity, callus, pre-ulcer, or history of ulceration  High risk     Hyperkeratosis: present on both first toes,-- present on both first sub metatarsals,-- present on both third sub metatarsals-- and-- Severe profound moccasin tinea pedis bilateral     Shoe Gear Evaluation: performed ()  Recommendation(s): SAS style       Procedure   The patient was educated on the diabetic foot and good glucose control  All mycotic nails debrided   Bilateral pre-ulcerative lesions debrided today without pain or complication           abdominal pain

## 2024-08-29 ENCOUNTER — PATIENT OUTREACH (OUTPATIENT)
Dept: CARE COORDINATION | Facility: CLINIC | Age: 64
End: 2024-08-29
Payer: COMMERCIAL

## 2024-08-29 NOTE — LETTER
PRIMARY CARE CARE COORDINATION   [facility]  [address], [place] [address]    September 3, 2024    Isabell Matthews  [address]  [place] [address]      Dear Isabell,        I am a clinic care coordinator who works with Bony Castaneda MD with the Primary Care clinic at the [facility] I have been trying to reach you recently to introduce Clinic Care Coordination. I recently tried to call and was unable to reach you. Below is a description of clinic care coordination and how I can further assist you.       The clinic care coordinator nurse is a nurse who understands the health care system. The goal of clinic care coordination is to help you manage your health and improve access to the health care system. Our team works alongside your provider to assist you in determining your health and social needs. We can help you obtain health care and community resources, providing you with necessary information and education. We can work with you through any barriers and develop a care plan that helps coordinate and strengthen the communication between you and your care team. Our services are voluntary and are offered without charge to you personally.    Please feel free to contact me with any questions or concerns regarding care coordination and what we can offer.      We are focused on providing you with the highest-quality healthcare experience possible.    Sincerely,     Masha MATTSON RN Care Manager  [facility]   Phone: [phone]  Fax: [phone]

## 2024-08-29 NOTE — PROGRESS NOTES
Clinic Care Coordination Contact  [facility]/Voicemail    Clinical Data: Care Coordinator Outreach    Outreach Documentation Number of Outreach Attempt   8/29/2024  10:00 AM 1   8/29/2024  10:03 AM 1     Left message on patient's daughter's voicemail with call back information and requested return call. Patient's daughter is caregiver, though no consent to communicate on file. RN left non-detailed VM requesting scheduling help for hospital follow up appointment and inquiring to speak with patient. RN informed would also send [employer] message as well.     Plan: Care Coordinator will try to reach patient again in 1-2 days.    JESSICA MCCABE RN on 8/29/2024 at 10:08 AM

## 2024-09-03 ENCOUNTER — MYC REFILL (OUTPATIENT)
Dept: FAMILY MEDICINE | Facility: CLINIC | Age: 64
End: 2024-09-03
Payer: COMMERCIAL

## 2024-09-03 ENCOUNTER — TELEPHONE (OUTPATIENT)
Dept: FAMILY MEDICINE | Facility: CLINIC | Age: 64
End: 2024-09-03
Payer: COMMERCIAL

## 2024-09-03 DIAGNOSIS — E10.59 TYPE 1 DIABETES MELLITUS WITH OTHER CIRCULATORY COMPLICATION (H): ICD-10-CM

## 2024-09-03 DIAGNOSIS — E11.649 TYPE 2 DIABETES MELLITUS WITH HYPOGLYCEMIA WITHOUT COMA, WITH LONG-TERM CURRENT USE OF INSULIN (H): ICD-10-CM

## 2024-09-03 DIAGNOSIS — Z79.4 TYPE 2 DIABETES MELLITUS WITH HYPOGLYCEMIA WITHOUT COMA, WITH LONG-TERM CURRENT USE OF INSULIN (H): ICD-10-CM

## 2024-09-03 RX ORDER — INSULIN ASPART 100 [IU]/ML
INJECTION, SOLUTION INTRAVENOUS; SUBCUTANEOUS
Qty: 15 ML | Refills: 4 | Status: SHIPPED | OUTPATIENT
Start: 2024-09-03 | End: 2024-10-07

## 2024-09-03 RX ORDER — INSULIN ASPART 100 [IU]/ML
2-4 INJECTION, SOLUTION INTRAVENOUS; SUBCUTANEOUS
Qty: 15 ML | Refills: 11 | Status: CANCELLED | OUTPATIENT
Start: 2024-09-03

## 2024-09-03 NOTE — PROGRESS NOTES
Clinic Care Coordination Contact  [facility]/Voicemail    Clinical Data: Care Coordinator Outreach    Outreach Documentation Number of Outreach Attempt   8/29/2024  10:00 AM 1   8/29/2024  10:03 AM 1   9/3/2024   9:42 AM 2       Left message on patient's daughter's voicemail (see previous note) with call back information and requested return call.    Plan: Care Coordinator will send unable to contact letter with care coordinator contact information via mail. Care Coordinator will do no further outreaches at this time.    JESSICA MCCABE RN on 9/3/2024 at 9:42 AM

## 2024-09-03 NOTE — TELEPHONE ENCOUNTER
[facility] Call Center    Phone Message    May a detailed message be left on voicemail: yes     Reason for Call: Medication Question or concern regarding medication   Prescription Clarification  Name of Medication: insulin aspart (NOVOLOG FLEXPEN) 100 UNIT/ML pen [[id number]]  Prescribing Provider: Bony Castaneda MD   Pharmacy: [id number] - [place], [place] - [address] AT [facility] [address]   What on the order needs clarification? Patient needs refill asap - please send to above pharmacy.  Will be out of insulin today      Action Taken: Other: PCC    Travel Screening: Not Applicable     Date of Service:

## 2024-09-03 NOTE — TELEPHONE ENCOUNTER
insulin aspart (NOVOLOG FLEXPEN) 100 UNIT/ML pen     Remaining refills sent to requested pharmacy. Per WO, per pt call request.

## 2024-09-10 RX ORDER — INSULIN ASPART 100 [IU]/ML
INJECTION, SOLUTION INTRAVENOUS; SUBCUTANEOUS
Qty: 15 ML | Refills: 4 | OUTPATIENT
Start: 2024-09-10

## 2024-09-22 ENCOUNTER — HEALTH MAINTENANCE LETTER (OUTPATIENT)
Age: 64
End: 2024-09-22

## 2024-09-23 ENCOUNTER — APPOINTMENT (OUTPATIENT)
Dept: CT IMAGING | Facility: CLINIC | Age: 64
DRG: 206 | End: 2024-09-23
Attending: STUDENT IN AN ORGANIZED HEALTH CARE EDUCATION/TRAINING PROGRAM
Payer: COMMERCIAL

## 2024-09-23 ENCOUNTER — HOSPITAL ENCOUNTER (INPATIENT)
Facility: CLINIC | Age: 64
LOS: 2 days | Discharge: HOME HEALTH | DRG: 206 | End: 2024-09-25
Attending: STUDENT IN AN ORGANIZED HEALTH CARE EDUCATION/TRAINING PROGRAM | Admitting: SURGERY
Payer: COMMERCIAL

## 2024-09-23 ENCOUNTER — NURSE TRIAGE (OUTPATIENT)
Dept: NURSING | Facility: CLINIC | Age: 64
End: 2024-09-23

## 2024-09-23 DIAGNOSIS — Z86.73 HISTORY OF CVA (CEREBROVASCULAR ACCIDENT): ICD-10-CM

## 2024-09-23 DIAGNOSIS — N39.0 URINARY TRACT INFECTION IN FEMALE: ICD-10-CM

## 2024-09-23 DIAGNOSIS — S22.31XA CLOSED FRACTURE OF ONE RIB OF RIGHT SIDE, INITIAL ENCOUNTER: Primary | ICD-10-CM

## 2024-09-23 DIAGNOSIS — S22.32XA CLOSED FRACTURE OF ONE RIB OF LEFT SIDE, INITIAL ENCOUNTER: ICD-10-CM

## 2024-09-23 DIAGNOSIS — F01.50 VASCULAR DEMENTIA, UNCOMPLICATED (H): ICD-10-CM

## 2024-09-23 DIAGNOSIS — R29.6 FALLING: ICD-10-CM

## 2024-09-23 DIAGNOSIS — Z86.39 HISTORY OF DIABETES WITH KETOACIDOSIS: ICD-10-CM
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
## 2024-09-23 LAB
ALBUMIN SERPL BCG-MCNC: 3.8 G/DL (ref 3.5–5.2)
ALBUMIN UR-MCNC: 30 MG/DL
ALP SERPL-CCNC: 142 U/L (ref 40–150)
ALT SERPL W P-5'-P-CCNC: 13 U/L (ref 0–50)
ANION GAP SERPL CALCULATED.3IONS-SCNC: 10 MMOL/L (ref 7–15)
APPEARANCE UR: ABNORMAL
AST SERPL W P-5'-P-CCNC: 20 U/L (ref 0–45)
BACTERIA #/AREA URNS HPF: ABNORMAL /HPF
BASOPHILS # BLD AUTO: 0 10E3/UL (ref 0–0.2)
BASOPHILS NFR BLD AUTO: 1 %
BILIRUB SERPL-MCNC: 0.2 MG/DL
BILIRUB UR QL STRIP: NEGATIVE
BUN SERPL-MCNC: 28.9 MG/DL (ref 8–23)
CALCIUM SERPL-MCNC: 9.2 MG/DL (ref 8.8–10.4)
CHLORIDE SERPL-SCNC: 104 MMOL/L (ref 98–107)
COLOR UR AUTO: YELLOW
CREAT SERPL-MCNC: 1.46 MG/DL (ref 0.51–0.95)
EGFRCR SERPLBLD CKD-EPI 2021: 39 ML/MIN/1.73M2
EOSINOPHIL # BLD AUTO: 0.1 10E3/UL (ref 0–0.7)
EOSINOPHIL NFR BLD AUTO: 1 %
ERYTHROCYTE [DISTWIDTH] IN BLOOD BY AUTOMATED COUNT: 14.7 % (ref 10–15)
EST. AVERAGE GLUCOSE BLD GHB EST-MCNC: 275 MG/DL
GLUCOSE BLDC GLUCOMTR-MCNC: 225 MG/DL (ref 70–99)
GLUCOSE SERPL-MCNC: 183 MG/DL (ref 70–99)
GLUCOSE UR STRIP-MCNC: NEGATIVE MG/DL
HBA1C MFR BLD: 11.2 %
HCO3 SERPL-SCNC: 26 MMOL/L (ref 22–29)
HCT VFR BLD AUTO: 39.5 % (ref 35–47)
HGB BLD-MCNC: 12.4 G/DL (ref 11.7–15.7)
HGB UR QL STRIP: ABNORMAL
IMM GRANULOCYTES # BLD: 0 10E3/UL
IMM GRANULOCYTES NFR BLD: 0 %
INR PPP: 1.02 (ref 0.85–1.15)
KETONES UR STRIP-MCNC: NEGATIVE MG/DL
LACTATE SERPL-SCNC: 1 MMOL/L (ref 0.7–2)
LEUKOCYTE ESTERASE UR QL STRIP: ABNORMAL
LIPASE SERPL-CCNC: 22 U/L (ref 13–60)
LYMPHOCYTES # BLD AUTO: 1.6 10E3/UL (ref 0.8–5.3)
LYMPHOCYTES NFR BLD AUTO: 21 %
MCH RBC QN AUTO: 28.2 PG (ref 26.5–33)
MCHC RBC AUTO-ENTMCNC: 31.4 G/DL (ref 31.5–36.5)
MCV RBC AUTO: 90 FL (ref 78–100)
MONOCYTES # BLD AUTO: 0.4 10E3/UL (ref 0–1.3)
MONOCYTES NFR BLD AUTO: 5 %
MUCOUS THREADS #/AREA URNS LPF: PRESENT /LPF
NEUTROPHILS # BLD AUTO: 5.6 10E3/UL (ref 1.6–8.3)
NEUTROPHILS NFR BLD AUTO: 72 %
NITRATE UR QL: NEGATIVE
NRBC # BLD AUTO: 0 10E3/UL
NRBC BLD AUTO-RTO: 0 /100
PH UR STRIP: 5.5 [PH] (ref 5–7)
PLATELET # BLD AUTO: 244 10E3/UL (ref 150–450)
POTASSIUM SERPL-SCNC: 4.3 MMOL/L (ref 3.4–5.3)
PROT SERPL-MCNC: 6.8 G/DL (ref 6.4–8.3)
RBC # BLD AUTO: 4.39 10E6/UL (ref 3.8–5.2)
RBC URINE: 6 /HPF
SODIUM SERPL-SCNC: 140 MMOL/L (ref 135–145)
SP GR UR STRIP: 1.02 (ref 1–1.03)
SQUAMOUS EPITHELIAL: 1 /HPF
UROBILINOGEN UR STRIP-MCNC: NORMAL MG/DL
WBC # BLD AUTO: 7.7 10E3/UL (ref 4–11)
WBC URINE: 86 /HPF

## 2024-09-23 PROCEDURE — 80053 COMPREHEN METABOLIC PANEL: CPT | Performed by: STUDENT IN AN ORGANIZED HEALTH CARE EDUCATION/TRAINING PROGRAM

## 2024-09-23 PROCEDURE — 96365 THER/PROPH/DIAG IV INF INIT: CPT | Performed by: STUDENT IN AN ORGANIZED HEALTH CARE EDUCATION/TRAINING PROGRAM

## 2024-09-23 PROCEDURE — 71250 CT THORAX DX C-: CPT | Mod: 26 | Performed by: RADIOLOGY

## 2024-09-23 PROCEDURE — 250N000011 HC RX IP 250 OP 636: Performed by: STUDENT IN AN ORGANIZED HEALTH CARE EDUCATION/TRAINING PROGRAM

## 2024-09-23 PROCEDURE — 83036 HEMOGLOBIN GLYCOSYLATED A1C: CPT | Performed by: STUDENT IN AN ORGANIZED HEALTH CARE EDUCATION/TRAINING PROGRAM

## 2024-09-23 PROCEDURE — 83605 ASSAY OF LACTIC ACID: CPT | Performed by: STUDENT IN AN ORGANIZED HEALTH CARE EDUCATION/TRAINING PROGRAM

## 2024-09-23 PROCEDURE — 85610 PROTHROMBIN TIME: CPT | Performed by: STUDENT IN AN ORGANIZED HEALTH CARE EDUCATION/TRAINING PROGRAM

## 2024-09-23 PROCEDURE — 71250 CT THORAX DX C-: CPT

## 2024-09-23 PROCEDURE — 99285 EMERGENCY DEPT VISIT HI MDM: CPT | Mod: 25 | Performed by: STUDENT IN AN ORGANIZED HEALTH CARE EDUCATION/TRAINING PROGRAM

## 2024-09-23 PROCEDURE — 99285 EMERGENCY DEPT VISIT HI MDM: CPT | Performed by: STUDENT IN AN ORGANIZED HEALTH CARE EDUCATION/TRAINING PROGRAM

## 2024-09-23 PROCEDURE — 83690 ASSAY OF LIPASE: CPT | Performed by: STUDENT IN AN ORGANIZED HEALTH CARE EDUCATION/TRAINING PROGRAM

## 2024-09-23 PROCEDURE — 258N000003 HC RX IP 258 OP 636: Performed by: STUDENT IN AN ORGANIZED HEALTH CARE EDUCATION/TRAINING PROGRAM

## 2024-09-23 PROCEDURE — 85025 COMPLETE CBC W/AUTO DIFF WBC: CPT | Performed by: STUDENT IN AN ORGANIZED HEALTH CARE EDUCATION/TRAINING PROGRAM

## 2024-09-23 PROCEDURE — 36415 COLL VENOUS BLD VENIPUNCTURE: CPT | Performed by: STUDENT IN AN ORGANIZED HEALTH CARE EDUCATION/TRAINING PROGRAM

## 2024-09-23 PROCEDURE — 250N000013 HC RX MED GY IP 250 OP 250 PS 637: Performed by: STUDENT IN AN ORGANIZED HEALTH CARE EDUCATION/TRAINING PROGRAM

## 2024-09-23 PROCEDURE — 120N000002 HC R&B MED SURG/OB UMMC

## 2024-09-23 PROCEDURE — 82962 GLUCOSE BLOOD TEST: CPT

## 2024-09-23 PROCEDURE — 81001 URINALYSIS AUTO W/SCOPE: CPT | Performed by: STUDENT IN AN ORGANIZED HEALTH CARE EDUCATION/TRAINING PROGRAM

## 2024-09-23 PROCEDURE — 74176 CT ABD & PELVIS W/O CONTRAST: CPT | Mod: 26 | Performed by: RADIOLOGY

## 2024-09-23 PROCEDURE — 87086 URINE CULTURE/COLONY COUNT: CPT | Performed by: STUDENT IN AN ORGANIZED HEALTH CARE EDUCATION/TRAINING PROGRAM

## 2024-09-23 RX ORDER — HYDROCODONE BITARTRATE AND ACETAMINOPHEN 5; 325 MG/1; MG/1
1 TABLET ORAL ONCE
Status: COMPLETED | OUTPATIENT
Start: 2024-09-23 | End: 2024-09-23

## 2024-09-23 RX ORDER — ASPIRIN 81 MG/1
81 TABLET, CHEWABLE ORAL DAILY
Status: DISCONTINUED | OUTPATIENT
Start: 2024-09-24 | End: 2024-09-25 | Stop reason: HOSPADM

## 2024-09-23 RX ORDER — AMOXICILLIN 250 MG
1-2 CAPSULE ORAL 2 TIMES DAILY
Status: DISCONTINUED | OUTPATIENT
Start: 2024-09-23 | End: 2024-09-25 | Stop reason: HOSPADM

## 2024-09-23 RX ORDER — ACETAMINOPHEN 325 MG/1
975 TABLET ORAL EVERY 8 HOURS
Status: DISCONTINUED | OUTPATIENT
Start: 2024-09-23 | End: 2024-09-25 | Stop reason: HOSPADM

## 2024-09-23 RX ORDER — LEVETIRACETAM 500 MG/1
500 TABLET ORAL 2 TIMES DAILY
Status: DISCONTINUED | OUTPATIENT
Start: 2024-09-23 | End: 2024-09-25 | Stop reason: HOSPADM

## 2024-09-23 RX ORDER — NICOTINE POLACRILEX 4 MG
15-30 LOZENGE BUCCAL
Status: DISCONTINUED | OUTPATIENT
Start: 2024-09-23 | End: 2024-09-24

## 2024-09-23 RX ORDER — CEFTRIAXONE 1 G/1
1 INJECTION, POWDER, FOR SOLUTION INTRAMUSCULAR; INTRAVENOUS EVERY 24 HOURS
Status: DISCONTINUED | OUTPATIENT
Start: 2024-09-24 | End: 2024-09-25

## 2024-09-23 RX ORDER — DEXTROSE MONOHYDRATE 25 G/50ML
25-50 INJECTION, SOLUTION INTRAVENOUS
Status: DISCONTINUED | OUTPATIENT
Start: 2024-09-23 | End: 2024-09-24

## 2024-09-23 RX ORDER — DULOXETIN HYDROCHLORIDE 20 MG/1
20 CAPSULE, DELAYED RELEASE ORAL DAILY
Status: DISCONTINUED | OUTPATIENT
Start: 2024-09-24 | End: 2024-09-25 | Stop reason: HOSPADM

## 2024-09-23 RX ORDER — ONDANSETRON 4 MG/1
4 TABLET, ORALLY DISINTEGRATING ORAL ONCE
Status: COMPLETED | OUTPATIENT
Start: 2024-09-23 | End: 2024-09-23

## 2024-09-23 RX ORDER — LEVOTHYROXINE SODIUM 75 UG/1
75 TABLET ORAL DAILY
Status: DISCONTINUED | OUTPATIENT
Start: 2024-09-24 | End: 2024-09-25 | Stop reason: HOSPADM

## 2024-09-23 RX ORDER — GABAPENTIN 100 MG/1
100 CAPSULE ORAL 3 TIMES DAILY
Status: DISCONTINUED | OUTPATIENT
Start: 2024-09-23 | End: 2024-09-25 | Stop reason: HOSPADM

## 2024-09-23 RX ORDER — AMLODIPINE BESYLATE 5 MG/1
5 TABLET ORAL DAILY
Status: DISCONTINUED | OUTPATIENT
Start: 2024-09-24 | End: 2024-09-25 | Stop reason: HOSPADM

## 2024-09-23 RX ORDER — SODIUM CHLORIDE, SODIUM LACTATE, POTASSIUM CHLORIDE, CALCIUM CHLORIDE 600; 310; 30; 20 MG/100ML; MG/100ML; MG/100ML; MG/100ML
1000 INJECTION, SOLUTION INTRAVENOUS CONTINUOUS
Status: DISCONTINUED | OUTPATIENT
Start: 2024-09-23 | End: 2024-09-24

## 2024-09-23 RX ORDER — ONDANSETRON 2 MG/ML
4 INJECTION INTRAMUSCULAR; INTRAVENOUS EVERY 6 HOURS PRN
Status: DISCONTINUED | OUTPATIENT
Start: 2024-09-23 | End: 2024-09-25 | Stop reason: HOSPADM

## 2024-09-23 RX ORDER — OXYCODONE HYDROCHLORIDE 5 MG/1
5 TABLET ORAL EVERY 4 HOURS PRN
Status: DISCONTINUED | OUTPATIENT
Start: 2024-09-23 | End: 2024-09-25

## 2024-09-23 RX ORDER — CEFTRIAXONE 1 G/1
1 INJECTION, POWDER, FOR SOLUTION INTRAMUSCULAR; INTRAVENOUS ONCE
Status: COMPLETED | OUTPATIENT
Start: 2024-09-23 | End: 2024-09-23

## 2024-09-23 RX ORDER — CARVEDILOL 12.5 MG/1
12.5 TABLET ORAL 2 TIMES DAILY WITH MEALS
Status: DISCONTINUED | OUTPATIENT
Start: 2024-09-23 | End: 2024-09-25 | Stop reason: HOSPADM

## 2024-09-23 RX ORDER — AMLODIPINE BESYLATE 5 MG/1
5 TABLET ORAL DAILY
Status: DISCONTINUED | OUTPATIENT
Start: 2024-09-24 | End: 2024-09-23

## 2024-09-23 RX ORDER — HEPARIN SODIUM 5000 [USP'U]/.5ML
5000 INJECTION, SOLUTION INTRAVENOUS; SUBCUTANEOUS EVERY 8 HOURS
Status: DISCONTINUED | OUTPATIENT
Start: 2024-09-23 | End: 2024-09-25 | Stop reason: HOSPADM

## 2024-09-23 RX ORDER — LANOLIN ALCOHOL/MO/W.PET/CERES
1000 CREAM (GRAM) TOPICAL DAILY
Status: DISCONTINUED | OUTPATIENT
Start: 2024-09-24 | End: 2024-09-25 | Stop reason: HOSPADM

## 2024-09-23 RX ORDER — ATORVASTATIN CALCIUM 40 MG/1
40 TABLET, FILM COATED ORAL DAILY
Status: DISCONTINUED | OUTPATIENT
Start: 2024-09-24 | End: 2024-09-25 | Stop reason: HOSPADM

## 2024-09-23 RX ORDER — METHOCARBAMOL 500 MG/1
500 TABLET, FILM COATED ORAL EVERY 6 HOURS PRN
Status: DISCONTINUED | OUTPATIENT
Start: 2024-09-23 | End: 2024-09-25 | Stop reason: HOSPADM

## 2024-09-23 RX ORDER — LIDOCAINE 4 G/G
1 PATCH TOPICAL
Status: DISCONTINUED | OUTPATIENT
Start: 2024-09-24 | End: 2024-09-25 | Stop reason: HOSPADM

## 2024-09-23 RX ORDER — LORATADINE 10 MG/1
10 TABLET ORAL DAILY
Status: DISCONTINUED | OUTPATIENT
Start: 2024-09-24 | End: 2024-09-25 | Stop reason: HOSPADM

## 2024-09-23 RX ORDER — ONDANSETRON 4 MG/1
4 TABLET, ORALLY DISINTEGRATING ORAL EVERY 6 HOURS PRN
Status: DISCONTINUED | OUTPATIENT
Start: 2024-09-23 | End: 2024-09-25 | Stop reason: HOSPADM

## 2024-09-23 RX ADMIN — HEPARIN SODIUM 5000 UNITS: 5000 INJECTION, SOLUTION INTRAVENOUS; SUBCUTANEOUS at 21:45

## 2024-09-23 RX ADMIN — SODIUM CHLORIDE, POTASSIUM CHLORIDE, SODIUM LACTATE AND CALCIUM CHLORIDE 1000 ML: 600; 310; 30; 20 INJECTION, SOLUTION INTRAVENOUS at 21:45

## 2024-09-23 RX ADMIN — GABAPENTIN 100 MG: 100 CAPSULE ORAL at 21:44

## 2024-09-23 RX ADMIN — CEFTRIAXONE 1 G: 1 INJECTION, POWDER, FOR SOLUTION INTRAMUSCULAR; INTRAVENOUS at 19:42

## 2024-09-23 RX ADMIN — LEVETIRACETAM 500 MG: 500 TABLET, FILM COATED ORAL at 21:44

## 2024-09-23 RX ADMIN — SODIUM CHLORIDE 1000 ML: 9 INJECTION, SOLUTION INTRAVENOUS at 19:42

## 2024-09-23 RX ADMIN — HYDROCODONE BITARTRATE AND ACETAMINOPHEN 1 TABLET: 5; 325 TABLET ORAL at 13:39

## 2024-09-23 RX ADMIN — ACETAMINOPHEN 975 MG: 325 TABLET ORAL at 21:45

## 2024-09-23 RX ADMIN — CARVEDILOL 12.5 MG: 12.5 TABLET, FILM COATED ORAL at 21:45

## 2024-09-23 RX ADMIN — ONDANSETRON 4 MG: 4 TABLET, ORALLY DISINTEGRATING ORAL at 13:39

## 2024-09-23 RX ADMIN — SENNOSIDES AND DOCUSATE SODIUM 1 TABLET: 8.6; 5 TABLET ORAL at 21:45

## 2024-09-23 ASSESSMENT — ACTIVITIES OF DAILY LIVING (ADL)
ADLS_ACUITY_SCORE: 38
ADLS_ACUITY_SCORE: 38
ADLS_ACUITY_SCORE: 36
ADLS_ACUITY_SCORE: 38
ADLS_ACUITY_SCORE: 36
ADLS_ACUITY_SCORE: 38
ADLS_ACUITY_SCORE: 38
ADLS_ACUITY_SCORE: 36

## 2024-09-23 ASSESSMENT — COLUMBIA-SUICIDE SEVERITY RATING SCALE - C-SSRS
1. IN THE PAST MONTH, HAVE YOU WISHED YOU WERE DEAD OR WISHED YOU COULD GO TO SLEEP AND NOT WAKE UP?: NO
6. HAVE YOU EVER DONE ANYTHING, STARTED TO DO ANYTHING, OR PREPARED TO DO ANYTHING TO END YOUR LIFE?: NO
2. HAVE YOU ACTUALLY HAD ANY THOUGHTS OF KILLING YOURSELF IN THE PAST MONTH?: NO

## 2024-09-23 NOTE — ED PROVIDER NOTES
[place] EMERGENCY DEPARTMENT ([facility])    9/23/24       ED PROVIDER NOTE   Triage A 1:07 PM   History     Chief Complaint   Patient presents with    Fall     The history is provided by medical records and a relative (daughter). History limited by: dementia.     Isabell Matthews is a 64 year old female with history of vascular dementia, frequent falls, CVA, hypertension, hyperlipidemia, type 1 diabetes, seizure disorder who presents to the emergency department for evaluation with left rib bruising after a fall.  Patient's daughter reports that patient fell on Friday, 3 days ago and has been having worsening pain and bruising to her right ribs. Daughter was helping patient transfer from walker to wheelchair. Midtransfer patient tried to reach over and put on her new shoes which caused her to tip and fall onto a chair. Her ribs hit the chair and landed on her buttocks but did not strike her head.  Daughter states this is not her first fall. She asked patient if she was ok, patient had no complaints. She continued on with her day, went to clinic. The following day patient did seem to have more pain but not significant amounts. No comments or complaints overnight. However yesterday she had increased pain on left ribs where she fell against the chair. This morning she woke up with increased pain and had a large area of bruising on her right ribs.  She rates the pain at an 8/10, worse with inspiration and movement.  She is able to breathe but breathing is very painful.  Daughter was concerned that she has a rib fracture.  She was advised by nurse triage line to come in for further evaluation. No fevers, vomiting. Daughter notes that she seems more tired and out of it but otherwise close to baseline. Daughter notes that patient has been having increased difficulty with ADLs secondary to pain, had difficulty getting out of bed. No leg pain. Daughter notes that it can take patient a while to respond to questions,  but she is more slowed than usual. Daughter notes that she was on hydrocodone for 15 years, notes that patient will be more loopy on this medication making history more limited but does agree with a lower dose of this medication as she rates the pain at an 8. Daughter notes she completed 3 month course of Plavix. Daughter notes that she has an appointment to go to but will come back.     Past Medical and Surgical History, Medications, Allergies, and Social History were reviewed in the electronic medical record. Review with the patient was attempted but limited due to  dementia .   A complete review of systems was attempted but limited due to dementia.    Physical Exam   BP: 96/67  Pulse: 83  Temp: 97.8  F (36.6  C)  Resp: 18  SpO2: 97 %    Physical Exam  Constitutional:       General: She is not in acute distress.     Appearance: Normal appearance. She is not diaphoretic.   HENT:      Head: Normocephalic and atraumatic.      Comments: No signs of trauma to head or neck     Mouth/Throat:      Mouth: Mucous membranes are moist.   Eyes:      General: No scleral icterus.     Extraocular Movements: Extraocular movements intact.      Conjunctiva/sclera: Conjunctivae normal.      Pupils: Pupils are equal, round, and reactive to light.   Cardiovascular:      Rate and Rhythm: Normal rate and regular rhythm.      Heart sounds: Normal heart sounds.   Pulmonary:      Effort: No respiratory distress.      Breath sounds: Normal breath sounds.   Chest:      Chest wall: No tenderness.   Abdominal:      General: Abdomen is flat. Bowel sounds are normal.      Palpations: Abdomen is soft.      Tenderness: There is no abdominal tenderness.   Musculoskeletal:      Cervical back: Neck supple. No tenderness.      Thoracic back: No tenderness.      Lumbar back: No tenderness.      Comments: Large area of bruising to left lateral ribs   Skin:     General: Skin is warm.      Findings: No abrasion, laceration or rash.   Neurological:       General: No focal deficit present.      Mental Status: She is alert.      Cranial Nerves: No cranial nerve deficit.      Motor: Weakness present.      Comments: Oriented x 3 but slower to respond than usual per daughter           ED Course, Procedures, & Data      Procedures                No results found for any visits on 09/23/24.  Medications - No data to display  Labs Ordered and Resulted from Time of ED Arrival to Time of ED Departure - No data to display  No orders to display          Critical care was not performed.     Medical Decision Making  The patient's presentation was of high complexity (an acute health issue posing potential threat to life or bodily function).    The patient's evaluation involved:  review of external note(s) from 3+ sources (see separate area of note for details)  review of 3+ test result(s) ordered prior to this encounter (see separate area of note for details)  ordering and/or review of 3+ test(s) in this encounter (see separate area of note for details)  discussion of management or test interpretation with another health professional (see separate area of note for details)    The patient's management necessitated high risk (a decision regarding hospitalization).    Assessment & Plan    Patient's workup as reviewed and interpreted by me are significant for urinary tract infection as well as a urinary tract infection.   CT scan shows new posterior left 11th rib fracture, no evidence of pneumothorax, bilateral likely nonacute pleural effusions  Ordered ceftriaxone, fluids, and give pain control here in the emergency room  Plan was to admit patient to medicine for UTI, instability, in the context of patient being high risk with a rib fracture  Discussed case with medicine who requested us to speak with trauma surgery in case trauma surgery would like to admit patient  At time of signout to Dr. Mayes, patient pending trauma surgery recommendations and if trauma surgery would like to  admit, can admit to their service and if not, we will repage Dr. Blandon to admit to medicine  -Start ceftriaxone IV  -Await urine culture results    I have reviewed the nursing notes. I have reviewed the findings, diagnosis, plan and need for follow up with the patient.    New Prescriptions    No medications on file       Final diagnoses:   None     I, Roz Tan, am serving as a trained medical scribe to document services personally performed by Nathan Austin MD based on the provider's statements to me on September 23, 2024.  This document has been checked and approved by the attending provider.    I, Nathan Austin MD, was physically present and have reviewed and verified the accuracy of this note documented by Roz Tan, medical scribe.      Nathan Austin MD   [facility] EMERGENCY DEPARTMENT  9/23/2024     Nathan Austin MD  09/23/24 2002

## 2024-09-23 NOTE — TELEPHONE ENCOUNTER
"Nurse Triage SBAR    Is this a 2nd Level Triage? YES, LICENSED PRACTITIONER REVIEW IS REQUIRED    Situation: Patient's daughter is calling to report patient fell on Friday, and has worsening pain and bruising to L ribs, thinks she might have rib fracture(s)    Background: Patient lives with daughter, has early onset dementia. Last seen in clinic 7/8/24 after inpatient stay for CVA. Just finished 3 mo of Plavix on 9/16.     PMHx: CVA, vascular dementia, HTN, CKD, hypothyroid, HLD, T1DM, DKA, UTI, seizure    Meds: Plavix - course completed 9/16. ASA, Insulin (long and short acting), gabapentin, keppra, coreg    Assessment:  - Fall on Friday, hit chair and landed on butt. Did not strike her head  - Reported she was not in pain following fall, and went out Friday with daughter and did not have any concerns.  - Sunday patient woke up in pain that continued throughout day, woke up today in more severe pain.  - Ribs bruised, 6x8\"  - Pain 8/10, worse with movement and inspiration  - No reports of difficulty breathing but breathing is painful.  - No externally visible injury except bruising beneath skin.    Protocol Recommended Disposition:   Go to ED Now    Recommendation: Advised daughter (Maria Guadalupe) to take patient to ER for evaluation s/p fall. She verbalized understanding and agreeable to plan, notified that message would be sent to notify care team of situation.     Routed to provider/care team    Lucia MATTSON RN  [facility] Nursing/Red Flag Triage & Med Refill Team        Reason for Disposition   SEVERE chest pain    Additional Information   Negative: Major injury from dangerous force or speed (e.g., MVA, fall > 10 feet or 3 meters)   Negative: Bullet wound, knife wound or other penetrating object   Negative: Puncture wound that sounds life-threatening to the triager   Negative: SEVERE difficulty breathing (e.g., struggling for each breath, speaks in single words)   Negative: Major bleeding (actively dripping or spurting) " "that can't be stopped   Negative: Open wound of the chest with sound of moving air (sucking wound) or visible air bubbles   Negative: Shock suspected (e.g., cold/pale/clammy skin, too weak to stand, low BP, rapid pulse)   Negative: Coughing or spitting up blood   Negative: Bluish (or gray) lips or face   Negative: Unconscious or was unconscious   Negative: Sounds like a life-threatening emergency to the triager   Negative: Chest pain not from an injury   Negative: Wound looks infected    Answer Assessment - Initial Assessment Questions  1. MECHANISM: \"How did the injury happen?\"   Patient is unsteady on feet and uses walker, was transferring from walker to chair and fell backwards and hit the chair with her ribs and landed on her butt.    2. ONSET: \"When did the injury happen?\" (Minutes or hours ago)   Fall occurred Friday    3. LOCATION: \"Where on the chest is the injury located?\"   R side    4. APPEARANCE: \"What does the injury look like?\"   Large bruise - approximately 6x8\"    5. BLEEDING: \"Is there any bleeding now? If Yes, ask: How long has it been bleeding?\"   No external bleeding    6. SEVERITY: \"Any difficulty with breathing?\"   Breathing is painful with inhalation    7. SIZE: For cuts, bruises, or swelling, ask: \"How large is it?\" (e.g., inches or centimeters)   N/a    8. PAIN: \"Is there pain?\" If Yes, ask: \"How bad is the pain?\" (e.g., Scale 1-10; or mild, moderate, severe)   Rates 8/10    9. TETANUS: For any breaks in the skin, ask: \"When was the last tetanus booster?\"  N/a    10. PREGNANCY: \"Is there any chance you are pregnant?\" \"When was your last menstrual period?\"   N/a    Protocols used: Chest Injury-A-[place]    "

## 2024-09-23 NOTE — ED TRIAGE NOTES
Arrives by w/c with pain after a fall that occurred on Friday. Per family patient was transferring from walker to w/c when she fell back into a chair. Recently stopped taking Plavix.    Hx stroke       Triage Assessment (Adult)       Row Name 09/23/24 1304          Triage Assessment    Airway WDL WDL        Respiratory WDL    Respiratory WDL WDL        Skin Circulation/Temperature WDL    Skin Circulation/Temperature WDL WDL        Cardiac WDL    Cardiac WDL WDL        Peripheral/Neurovascular WDL    Peripheral Neurovascular WDL WDL        Cognitive/Neuro/Behavioral WDL    Cognitive/Neuro/Behavioral WDL WDL

## 2024-09-23 NOTE — H&P
[facility]    History and Physical / Consult note: Trauma Service     Date of Admission:  9/23/2024    Time of Admission/Consult Request (page/call): 6:53PM  Time of my evaluation: 7:04PM  Consulting services: None    Assessment & Plan   Trauma mechanism: Fall from standing  Time/date of injury: 9/20/24    Known Injuries:  Acute minimally displaced fracture of the left posterior 11th rib   Stable chronic displaced right 9-10th rib fractures    Other diagnoses:  Trace bilateral pleural effusions   UTI with chronic kidney failure     Procedure: N/A    Plan:  - Admit to trauma  - Pain control  - Regular diet  - Antibiotics for UTI  - PT/OT/SW    General Cares:  GI Prophylaxis: none  DVT Prophylaxis: Heparin  Date of last stool/Bowel Regimen:Senna  Pulmonary toilet: IS    Code status: DNR/DNI, confirmed with patient and daughter      Discussed with trauma staff on call, Dr. León.    Deann Maher MD      Chief Complaint   Left sided rib pain     History is obtained from the patient's daughter. Patient has known vascular dementia with limited speech and conversation.     History of Present Illness   Isabell Matthews is a 64 year old female with history of early onset vascular dementia, HTN, CKD, hypothroidism, HLD, T1DM, seizure who presents after a fall on 9/20/24 where she lost her balance after bending over to get her shoes, sliding against the chair along her left side and landing on her buttock. She did not strike her head and no reported loss of consciousness. This fall was witnessed by her daughter. She had no reported pain initially after the fall, however, yesterday (2 days after injury), she developed left sided pain, rated 8/10 with movement. No shortness of breath but states talking was more difficult. She was instructed by nursing staff to present to the ED where she was found to have an acute minimally displaced fracture of her left 11th posterior rib. She was  also found to have UTI without leukocytosis. Vitals are within normal limits.     Past Medical History    I have reviewed this patient's medical history and updated it with pertinent information if needed.   Past Medical History:   Diagnosis Date    Chronic pain     Coronary atherosclerosis 6/14/2016    Essential hypertension     Ex-cigarette smoker     quit 7/2018 over 35 years    Ex-cigarette smoker     Hyperlipidemia     Hypothyroid     Seizures (H)     Stroke (H)     Vascular dementia (H)        Past Surgical History   I have reviewed this patient's surgical history and updated it with pertinent information if needed.  Past Surgical History:   Procedure Laterality Date    athroplasty hip Bilateral     2003, 2006    OTHER SURGICAL HISTORY      athroplasty hip    PICC DOUBLE LUMEN PLACEMENT Right 06/11/2023    right basilic 5 fr dl power picc 39 cm    STENT       Prior to Admission Medications   Prior to Admission Medications   Prescriptions Last Dose Informant Patient Reported? Taking?   DULoxetine (CYMBALTA) 20 MG capsule   No No   Sig: Take 1 capsule (20 mg) by mouth daily   Glucagon (GVOKE HYPOPEN) 1 MG/0.2ML pen   No No   Sig: Inject the contents of 1 device under the skin into lower abdomen, outer thigh, or outer upper arm as needed for hypoglycemia. If no response after 15 minutes, additional 1 mg dose from a new device may be injected while waiting for emergency assistance.   SYNTHROID 75 MCG tablet   No No   Sig: Take 1 tablet (75 mcg) by mouth daily   amLODIPine (NORVASC) 5 MG tablet   No No   Sig: Take 1 tablet (5 mg) by mouth daily   aspirin (ASA) 81 MG chewable tablet   No No   Sig: Take 1 tablet (81 mg) by mouth daily   atorvastatin (LIPITOR) 40 MG tablet   No No   Sig: TAKE 1 TABLET(40 MG) BY MOUTH DAILY   blood glucose (TRUE METRIX BLOOD GLUCOSE TEST) test strip   No No   Sig: USE TO TEST BLOOD SUGAR 4 TO 5 TIMES DAILY OR AS DIRECTED   blood glucose monitoring (NO BRAND SPECIFIED) meter device kit   Daughter No No   Sig: Use to test blood sugar 4 times daily.  Please provide glucose meter that is covered by insurance.   carvedilol (COREG) 12.5 MG tablet   No No   Sig: Take 1 tablet (12.5 mg) by mouth 2 times daily (with meals)   clopidogrel (PLAVIX) 75 MG tablet   No No   Sig: Take 1 tablet (75 mg) by mouth daily for 89 days   cyanocobalamin (VITAMIN B-12) 1000 MCG tablet   No No   Sig: Take 1 tablet (1,000 mcg) by mouth daily   gabapentin (NEURONTIN) 100 MG capsule   No No   Sig: Take 1 capsule (100 mg) by mouth at bedtime   insulin aspart (NOVOLOG FLEXPEN) 100 UNIT/ML pen   No No   Sig: Inject 2-4 units with meals plus correction scale 1 unit per 100 >200 three times daily before meals and at bedtime. For Pre-Meal  - 164 give 1 unit.  For Pre-Meal  - 189 give 2 units.  For Pre-Meal  - 214 give 3 units.  For Pre-Meal  - 239 give 4 units.  For Pre-Meal  - 264 give 5 units.  For Pre-Meal  - 289 give 6 units.  For Pre-Meal  - 314 give 7 units.  For Pre-Meal  - 339 give 8 units.  For Pre-Meal  - 364 give 9 units.  For Pre-Meal BG greater than or equal to 365 give 10 units. FOR BEDTIME:  For  - 224 give 1 unit.  For  - 249 give 2 units.  For  - 274 give 3 units.  For  - 299 give 4 units.  For  - 324 give 5 units.  For  - 349 give 6 units.  For BG greater than or equal to 350 give 7 units.   insulin aspart (NOVOLOG PENFILL) 100 UNIT/ML cartridge   No No   Sig: Inject 2-4 Units Subcutaneous 4 times daily (with meals and nightly) diabetes   insulin glargine (LANTUS PEN) 100 UNIT/ML pen   No No   Sig: Inject 14 Units subcutaneously at bedtime.   insulin pen needle (31G X 8 MM) 31G X 8 MM miscellaneous   No No   Sig: Use 4 pen needles daily or as directed.   levETIRAcetam (KEPPRA) 500 MG tablet   No No   Sig: Take 1 tablet (500 mg) by mouth 2 times daily   loratadine (CLARITIN) 10 MG tablet   No No   Sig: Take 1 tablet (10 mg) by  mouth daily   zinc oxide (DESITIN) 20 % external ointment   No No   Sig: Apply topically as needed for dry skin or irritation      Facility-Administered Medications: None     Allergies   Allergies   Allergen Reactions    Seasonal Allergies        Social History   Social History     Socioeconomic History    Marital status:      Spouse name: Not on file    Number of children: Not on file    Years of education: Not on file    Highest education level: Not on file   Occupational History    Not on file   Tobacco Use    Smoking status: Former     Current packs/day: 0.00     Average packs/day: 0.5 packs/day for 35.0 years (17.5 ttl pk-yrs)     Types: Cigarettes     Start date: 1983     Quit date: 2018     Years since quittin.2    Smokeless tobacco: Never   Substance and Sexual Activity    Alcohol use: Not Currently    Drug use: Not Currently    Sexual activity: Not Currently   Other Topics Concern    Parent/sibling w/ CABG, MI or angioplasty before 65F 55M? Not Asked   Social History Narrative    ** Merged History Encounter **         Recently moved to [facility] with Daughter Charli who is her pca     Social Determinants of Health     Financial Resource Strain: Unknown (2024)    Financial Resource Strain     Within the past 12 months, have you or your family members you live with been unable to get utilities (heat, electricity) when it was really needed?: Patient unable to answer   Food Insecurity: Low Risk  (2024)    Food Insecurity     Within the past 12 months, did you worry that your food would run out before you got money to buy more?: No     Within the past 12 months, did the food you bought just not last and you didn t have money to get more?: No   Transportation Needs: Unknown (2024)    Transportation Needs     Within the past 12 months, has lack of transportation kept you from medical appointments, getting your medicines, non-medical meetings or appointments, work, or from getting  things that you need?: Patient unable to answer   Physical Activity: Not on file   Stress: Not on file   Social Connections: Not on file   Interpersonal Safety: Low Risk  (8/25/2024)    Interpersonal Safety     Do you feel physically and emotionally safe where you currently live?: Yes     Within the past 12 months, have you been hit, slapped, kicked or otherwise physically hurt by someone?: No     Within the past 12 months, have you been humiliated or emotionally abused in other ways by your partner or ex-partner?: No   Housing Stability: Unknown (8/25/2024)    Housing Stability     Do you have housing? : Patient unable to answer     Are you worried about losing your housing?: Patient unable to answer     No smoking  No alcohol use  No other drug use  Lives with daughter in an apartment, she does attends facilities during the day that may facilitate therapies     ETOH: This patient was asked if in the last 3-6 months there has been a time when she had 4 or more drinks in a single day/outing.. Patient answer to the screening question was in the negative. No intervention needed.    Family History   Family history reviewed with patient and is noncontributory.    Review of Systems   CONSTITUTIONAL: No fever, chills, sweats, fatigue   EYES: no visual blurring, no double vision or visual loss  ENT: no decrease in hearing, no tinnitus, no vertigo, no hoarseness  RESPIRATORY: no shortness of breath, no cough, no sputum   CARDIOVASCULAR: no palpitations, no chest pain, no exertional chest pain or pressure  GASTROINTESTINAL: no nausea or vomiting, or abd pain  GENITOURINARY: no dysuria, no frequency or hesitancy, no hematuria  MUSCULOSKELETAL: no weakness, no redness, no swelling, no joint pain  SKIN: no rashes, ecchymoses, abrasions or lacerations  NEUROLOGIC: no numbness or tingling of hands, no numbness or tingling  of feet, no syncope, no tremors or weakness  PSYCHIATRIC: no sleep disturbances, no anxiety or  depression    Physical Exam   Temp: 97.8  F (36.6  C) Temp src: Oral BP: 96/67 Pulse: 83   Resp: 18 SpO2: 97 % O2 Device: None (Room air)    Vital Signs with Ranges  Temp:  [97.8  F (36.6  C)] 97.8  F (36.6  C)  Pulse:  [83] 83  Resp:  [18] 18  BP: (96)/(67) 96/67  SpO2:  [97 %] 97 % 0 lbs 0 oz    Primary Survey:  Airway: patient talking  Breathing: symmetric respiratory effort bilaterally  Circulation: central pulses present and peripheral pulses present  Disability: Pupils - left 4 mm and brisk, right 4 mm and brisk     Nina Coma Scale - Total 15/15  Eye Response (E): 4  4= spontaneous,  3= to verbal/voice, 2=  to pain, 1= No response   Verbal Response (V): 5   5= Orientated, converses,  4= Confused, converses, 3= Inappropriate words,  2= Incomprehensible sounds,  1=No response   Motor Response (M): 6   6= Obeys commands, 5= Localizes to pain, 4= Withdrawal to pain, 3=Fexion to pain, 2= Extension to pain, 1= No response    Secondary Survey:  General: alert, oriented to person, place, time, she has delayed responses but does respond correctly when asked questions.   Head: atraumatic, normocephalic, trachea midline  Eyes: PERRLA, pupils 5mm, EOMI, corneas and conjunctivae clear  Ears: pearly grey bilateral TMs and non-inflamed external ear canals  Nose: nares patent, no drainage, nasal septum non-tender  Mouth/Throat: no exudates or erythema,  no dental tenderness or malocclusions, no tongue lacerations  Neck: No midline posterior tenderness, full AROM without pain or tenderness   Chest/Pulmonary: normal respiratory rate and rhythm, crackles along the left lung fields with deep breaths, no wheezes, rales or rhonchi, left posterior chest wall tenderness and ecchymosis   Cardiovascular: S1, S2, normal and regular rate and rhythm, no murmurs  Abdomen: soft, non-tender, no guarding, no rebound tenderness and minimally tender to palpation of the midline abdomen   : pelvis stable to lateral compression  Back/Spine:  no deformity, no midline tenderness, no sacral tenderness, no step-offs and no abrasions or contusions  Musculoskel/Extremities: normal extremities, full AROM of major joints without tenderness, she has baseline left shoulder pain without deformity, ecchymosis, or edema. Pedal pulses palpable, no edema.  Hand: no gross deformities of hands or fingers. Full AROM of hand and fingers in flexion and extension.  strength equal and symmetric.   Skin: no rashes, laceration, ecchymosis, skin warm and dry.   Neuro: PERRLA, alert, oriented x 3. CN II-XII grossly intact. No focal deficits. Strength 3/5 x 4 extremities though baseline weak per daughter.  Sensation intact.  Psychiatric: affect/mood normal, cooperative, normal judgement/insight and memory intact  # Pain Assessment:      9/23/2024     1:01 PM   Current Pain Score   Patient currently in pain? yes   - Isabell is experiencing pain due to left rib pain. Pain management was discussed with Isabell and her daughter and the plan was created in a collaborative fashion.  Isabell's response to the current recommendations: compliant  - Opioid regimen: minimal amount of narcotic   - Response to opioid medications: Reduction of symptoms reduced from 8/10 to 4/10   - Bowel regimen: senna  - Pharmacologic adjuvants: Acetaminophen, lidocaine patch (she is unable to take ibuprofen or NSAIDs)  - Non-pharmacologic adjuvants: Ice  - Previous pain medications/therapies tried: None    Data   Results for orders placed or performed during the hospital encounter of 09/23/24 (from the past 24 hour(s))   CBC with platelets differential    Narrative    The following orders were created for panel order CBC with platelets differential.  Procedure                               Abnormality         Status                     ---------                               -----------         ------                     CBC with platelets and d...[[id number]]  Abnormal            Final result                  Please view results for these tests on the individual orders.   INR   Result Value Ref Range    INR 1.02 0.85 - 1.15   Comprehensive metabolic panel   Result Value Ref Range    Sodium 140 135 - 145 mmol/L    Potassium 4.3 3.4 - 5.3 mmol/L    Carbon Dioxide (CO2) 26 22 - 29 mmol/L    Anion Gap 10 7 - 15 mmol/L    Urea Nitrogen 28.9 (H) 8.0 - 23.0 mg/dL    Creatinine 1.46 (H) 0.51 - 0.95 mg/dL    GFR Estimate 39 (L) >60 mL/min/1.73m2    Calcium 9.2 8.8 - 10.4 mg/dL    Chloride 104 98 - 107 mmol/L    Glucose 183 (H) 70 - 99 mg/dL    Alkaline Phosphatase 142 40 - 150 U/L    AST 20 0 - 45 U/L    ALT 13 0 - 50 U/L    Protein Total 6.8 6.4 - 8.3 g/dL    Albumin 3.8 3.5 - 5.2 g/dL    Bilirubin Total 0.2 <=1.2 mg/dL   Lipase   Result Value Ref Range    Lipase 22 13 - 60 U/L   Lactic acid whole blood with 1x repeat in 2 hr when >2   Result Value Ref Range    Lactic Acid, Initial 1.0 0.7 - 2.0 mmol/L   CBC with platelets and differential   Result Value Ref Range    WBC Count 7.7 4.0 - 11.0 10e3/uL    RBC Count 4.39 3.80 - 5.20 10e6/uL    Hemoglobin 12.4 11.7 - 15.7 g/dL    Hematocrit 39.5 35.0 - 47.0 %    MCV 90 78 - 100 fL    MCH 28.2 26.5 - 33.0 pg    MCHC 31.4 (L) 31.5 - 36.5 g/dL    RDW 14.7 10.0 - 15.0 %    Platelet Count 244 150 - 450 10e3/uL    % Neutrophils 72 %    % Lymphocytes 21 %    % Monocytes 5 %    % Eosinophils 1 %    % Basophils 1 %    % Immature Granulocytes 0 %    NRBCs per 100 WBC 0 <1 /100    Absolute Neutrophils 5.6 1.6 - 8.3 10e3/uL    Absolute Lymphocytes 1.6 0.8 - 5.3 10e3/uL    Absolute Monocytes 0.4 0.0 - 1.3 10e3/uL    Absolute Eosinophils 0.1 0.0 - 0.7 10e3/uL    Absolute Basophils 0.0 0.0 - 0.2 10e3/uL    Absolute Immature Granulocytes 0.0 <=0.4 10e3/uL    Absolute NRBCs 0.0 10e3/uL   CT Chest Abdomen Pelvis w/o Contrast    Narrative    EXAMINATION: CT CHEST ABDOMEN PELVIS W/O CONTRAST, 9/23/2024 4:02 PM    TECHNIQUE:  Helical CT images from the thoracic inlet through the  symphysis pubis were  obtained without IV contrast.     COMPARISON: CT 3/7/2024, 9/4/2023    HISTORY: fall, left flank pain    FINDINGS:  CHEST:  Chest limited by cardiopulmonary motion artifact.    THYROID: Thyroid is unremarkable.    LUNGS/PLEURA: Trace bilateral pleural effusions. Subsegmental  atelectasis in the lung bases. No pneumothorax. Left lower lobe  subcentimeter calcified granuloma. The airway is patent.    MEDIASTINUM: Heart size is within normal limits. Small pericardial  effusion, unchanged from 3/7/2024. No suspicious mediastinal  lymphadenopathy.  The esophagus is unremarkable.    CHEST WALL: No suspicious axillary lymphadenopathy.    ABDOMEN/PELVIS:  LIVER: No focal hepatic mass.    BILIARY: Normal appearance of the gallbladder. No intra- or  extrahepatic biliary dilation.    PANCREAS: No focal pancreatic mass or ductal dilation.    SPLEEN: No splenic mass.    ADRENAL GLANDS: Subcentimeter left adrenal thickening versus nodule  measuring less than 10 Hounsfield units, likely represents benign  adrenal adenoma (7/116).    URINARY TRACT: Grossly stable bilateral renal cysts. No suspicious  renal mass, renal calculi, or hydronephrosis. Hilar vascular  calcifications. No bladder wall thickening.    REPRODUCTIVE ORGANS: Stable well-circumscribed soft tissue mass along  the posterior aspect of the uterus, likely pedunculated subserosal  fibroid.    STOMACH: Within normal limits.    BOWEL: Moderate colonic stool burden. Normal caliber of the small and  large bowel.  Normal appendix.    PERITONEUM/FLUID: Trace mesenteric edema. No focal fluid collection or  evidence of free fluid or free air.    VESSELS: No aneurysmal dilatation of the abdominal aorta. Bulky  calcifications of the origins of the aortic arch great vessels and  infrarenal abdominal aorta.     LYMPH NODES: No lymphadenopathy.    BONES/SOFT TISSUES: Minimally displaced acute fracture the left  posterior 11th rib. Chronic displaced right ninth and 10th  posterior  rib fractures. Unchanged grade 1 anterolisthesis of C7 on T1.  Multilevel cervical and thoracic endplate sclerotic changes and  vertebral disc height loss. Lower lumbar facet arthropathy. Bilateral  total hip arthroplasties.      Impression    IMPRESSION:  1.   Acute minimally displaced fracture of the left posterior 11th rib  without evidence of pneumothorax. Stable chronic displaced right ninth  and 10th rib fractures.   2.  Trace bilateral pleural effusions with subsegmental atelectasis in  the lung bases.  3.  Unchanged small pericardial effusion compared to 3/7/2024.  4.  No acute intra-abdominal pathology.    I have personally reviewed the examination and initial interpretation  and I agree with the findings.    MARIA DE JESUS ARIAS,          SYSTEM ID:  [id number]   UA with Microscopic reflex to Culture    Specimen: Urine, Clean Catch   Result Value Ref Range    Color Urine Yellow Colorless, Straw, Light Yellow, Yellow    Appearance Urine Slightly Cloudy (A) Clear    Glucose Urine Negative Negative mg/dL    Bilirubin Urine Negative Negative    Ketones Urine Negative Negative mg/dL    Specific Gravity Urine 1.019 1.003 - 1.035    Blood Urine Moderate (A) Negative    pH Urine 5.5 5.0 - 7.0    Protein Albumin Urine 30 (A) Negative mg/dL    Urobilinogen Urine Normal Normal, 2.0 mg/dL    Nitrite Urine Negative Negative    Leukocyte Esterase Urine Large (A) Negative    Bacteria Urine Few (A) None Seen /HPF    Mucus Urine Present (A) None Seen /LPF    RBC Urine 6 (H) <=2 /HPF    WBC Urine 86 (H) <=5 /HPF    Squamous Epithelials Urine 1 <=1 /HPF    Narrative    Urine Culture ordered based on laboratory criteria

## 2024-09-24 ENCOUNTER — APPOINTMENT (OUTPATIENT)
Dept: PHYSICAL THERAPY | Facility: CLINIC | Age: 64
DRG: 206 | End: 2024-09-24
Attending: STUDENT IN AN ORGANIZED HEALTH CARE EDUCATION/TRAINING PROGRAM
Payer: COMMERCIAL

## 2024-09-24 ENCOUNTER — APPOINTMENT (OUTPATIENT)
Dept: GENERAL RADIOLOGY | Facility: CLINIC | Age: 64
DRG: 206 | End: 2024-09-24
Attending: STUDENT IN AN ORGANIZED HEALTH CARE EDUCATION/TRAINING PROGRAM
Payer: COMMERCIAL

## 2024-09-24 LAB
ANION GAP SERPL CALCULATED.3IONS-SCNC: 9 MMOL/L (ref 7–15)
BUN SERPL-MCNC: 30.9 MG/DL (ref 8–23)
CALCIUM SERPL-MCNC: 7.9 MG/DL (ref 8.8–10.4)
CHLORIDE SERPL-SCNC: 104 MMOL/L (ref 98–107)
CREAT SERPL-MCNC: 1.63 MG/DL (ref 0.51–0.95)
EGFRCR SERPLBLD CKD-EPI 2021: 34 ML/MIN/1.73M2
GLUCOSE BLDC GLUCOMTR-MCNC: 124 MG/DL (ref 70–99)
GLUCOSE BLDC GLUCOMTR-MCNC: 141 MG/DL (ref 70–99)
GLUCOSE BLDC GLUCOMTR-MCNC: 182 MG/DL (ref 70–99)
GLUCOSE BLDC GLUCOMTR-MCNC: 184 MG/DL (ref 70–99)
GLUCOSE BLDC GLUCOMTR-MCNC: 230 MG/DL (ref 70–99)
GLUCOSE BLDC GLUCOMTR-MCNC: 235 MG/DL (ref 70–99)
GLUCOSE BLDC GLUCOMTR-MCNC: 251 MG/DL (ref 70–99)
GLUCOSE BLDC GLUCOMTR-MCNC: 254 MG/DL (ref 70–99)
GLUCOSE BLDC GLUCOMTR-MCNC: 258 MG/DL (ref 70–99)
GLUCOSE BLDC GLUCOMTR-MCNC: 266 MG/DL (ref 70–99)
GLUCOSE BLDC GLUCOMTR-MCNC: 282 MG/DL (ref 70–99)
GLUCOSE BLDC GLUCOMTR-MCNC: 389 MG/DL (ref 70–99)
GLUCOSE BLDC GLUCOMTR-MCNC: 430 MG/DL (ref 70–99)
GLUCOSE SERPL-MCNC: 450 MG/DL (ref 70–99)
HCO3 SERPL-SCNC: 24 MMOL/L (ref 22–29)
MAGNESIUM SERPL-MCNC: 2 MG/DL (ref 1.7–2.3)
PHOSPHATE SERPL-MCNC: 4.2 MG/DL (ref 2.5–4.5)
POTASSIUM SERPL-SCNC: 4.5 MMOL/L (ref 3.4–5.3)
SODIUM SERPL-SCNC: 137 MMOL/L (ref 135–145)

## 2024-09-24 PROCEDURE — 99232 SBSQ HOSP IP/OBS MODERATE 35: CPT | Performed by: NURSE PRACTITIONER

## 2024-09-24 PROCEDURE — 120N000002 HC R&B MED SURG/OB UMMC

## 2024-09-24 PROCEDURE — 97530 THERAPEUTIC ACTIVITIES: CPT | Mod: GP

## 2024-09-24 PROCEDURE — 84100 ASSAY OF PHOSPHORUS: CPT | Performed by: STUDENT IN AN ORGANIZED HEALTH CARE EDUCATION/TRAINING PROGRAM

## 2024-09-24 PROCEDURE — 250N000013 HC RX MED GY IP 250 OP 250 PS 637: Performed by: SURGERY

## 2024-09-24 PROCEDURE — 250N000013 HC RX MED GY IP 250 OP 250 PS 637: Performed by: STUDENT IN AN ORGANIZED HEALTH CARE EDUCATION/TRAINING PROGRAM

## 2024-09-24 PROCEDURE — 97116 GAIT TRAINING THERAPY: CPT | Mod: GP

## 2024-09-24 PROCEDURE — 71045 X-RAY EXAM CHEST 1 VIEW: CPT | Mod: 26 | Performed by: RADIOLOGY

## 2024-09-24 PROCEDURE — 250N000011 HC RX IP 250 OP 636: Performed by: STUDENT IN AN ORGANIZED HEALTH CARE EDUCATION/TRAINING PROGRAM

## 2024-09-24 PROCEDURE — 97161 PT EVAL LOW COMPLEX 20 MIN: CPT | Mod: GP

## 2024-09-24 PROCEDURE — 36415 COLL VENOUS BLD VENIPUNCTURE: CPT | Performed by: STUDENT IN AN ORGANIZED HEALTH CARE EDUCATION/TRAINING PROGRAM

## 2024-09-24 PROCEDURE — 250N000009 HC RX 250: Performed by: NURSE PRACTITIONER

## 2024-09-24 PROCEDURE — 71045 X-RAY EXAM CHEST 1 VIEW: CPT

## 2024-09-24 PROCEDURE — 82962 GLUCOSE BLOOD TEST: CPT

## 2024-09-24 PROCEDURE — 250N000012 HC RX MED GY IP 250 OP 636 PS 637: Performed by: STUDENT IN AN ORGANIZED HEALTH CARE EDUCATION/TRAINING PROGRAM

## 2024-09-24 PROCEDURE — 80048 BASIC METABOLIC PNL TOTAL CA: CPT | Performed by: STUDENT IN AN ORGANIZED HEALTH CARE EDUCATION/TRAINING PROGRAM

## 2024-09-24 PROCEDURE — 999N000128 HC STATISTIC PERIPHERAL IV START W/O US GUIDANCE

## 2024-09-24 PROCEDURE — 83735 ASSAY OF MAGNESIUM: CPT | Performed by: STUDENT IN AN ORGANIZED HEALTH CARE EDUCATION/TRAINING PROGRAM

## 2024-09-24 PROCEDURE — 258N000003 HC RX IP 258 OP 636: Performed by: NURSE PRACTITIONER

## 2024-09-24 RX ORDER — DEXTROSE MONOHYDRATE 100 MG/ML
INJECTION, SOLUTION INTRAVENOUS CONTINUOUS PRN
Status: DISCONTINUED | OUTPATIENT
Start: 2024-09-24 | End: 2024-09-25 | Stop reason: HOSPADM

## 2024-09-24 RX ORDER — DEXTROSE MONOHYDRATE 25 G/50ML
25-50 INJECTION, SOLUTION INTRAVENOUS
Status: DISCONTINUED | OUTPATIENT
Start: 2024-09-24 | End: 2024-09-25 | Stop reason: HOSPADM

## 2024-09-24 RX ORDER — NICOTINE POLACRILEX 4 MG
15-30 LOZENGE BUCCAL
Status: DISCONTINUED | OUTPATIENT
Start: 2024-09-24 | End: 2024-09-25 | Stop reason: HOSPADM

## 2024-09-24 RX ORDER — DEXTROSE MONOHYDRATE AND SODIUM CHLORIDE 5; .9 G/100ML; G/100ML
INJECTION, SOLUTION INTRAVENOUS CONTINUOUS
Status: DISCONTINUED | OUTPATIENT
Start: 2024-09-24 | End: 2024-09-25 | Stop reason: HOSPADM

## 2024-09-24 RX ADMIN — ASPIRIN 81 MG CHEWABLE TABLET 81 MG: 81 TABLET CHEWABLE at 08:13

## 2024-09-24 RX ADMIN — Medication 1000 MCG: at 08:13

## 2024-09-24 RX ADMIN — INSULIN HUMAN 5.5 UNITS/HR: 1 INJECTION, SOLUTION INTRAVENOUS at 09:26

## 2024-09-24 RX ADMIN — LIDOCAINE 1 PATCH: 4 PATCH TOPICAL at 08:15

## 2024-09-24 RX ADMIN — ACETAMINOPHEN 975 MG: 325 TABLET ORAL at 05:12

## 2024-09-24 RX ADMIN — ATORVASTATIN CALCIUM 40 MG: 40 TABLET, FILM COATED ORAL at 08:13

## 2024-09-24 RX ADMIN — HEPARIN SODIUM 5000 UNITS: 5000 INJECTION, SOLUTION INTRAVENOUS; SUBCUTANEOUS at 20:13

## 2024-09-24 RX ADMIN — GABAPENTIN 100 MG: 100 CAPSULE ORAL at 08:13

## 2024-09-24 RX ADMIN — SENNOSIDES AND DOCUSATE SODIUM 1 TABLET: 8.6; 5 TABLET ORAL at 08:13

## 2024-09-24 RX ADMIN — INSULIN ASPART 6 UNITS: 100 INJECTION, SOLUTION INTRAVENOUS; SUBCUTANEOUS at 08:15

## 2024-09-24 RX ADMIN — DEXTROSE AND SODIUM CHLORIDE: 5; 900 INJECTION, SOLUTION INTRAVENOUS at 14:12

## 2024-09-24 RX ADMIN — ACETAMINOPHEN 975 MG: 325 TABLET ORAL at 20:12

## 2024-09-24 RX ADMIN — SENNOSIDES AND DOCUSATE SODIUM 1 TABLET: 8.6; 5 TABLET ORAL at 20:12

## 2024-09-24 RX ADMIN — GABAPENTIN 100 MG: 100 CAPSULE ORAL at 13:33

## 2024-09-24 RX ADMIN — LORATADINE 10 MG: 10 TABLET ORAL at 08:14

## 2024-09-24 RX ADMIN — AMLODIPINE BESYLATE 5 MG: 5 TABLET ORAL at 08:13

## 2024-09-24 RX ADMIN — GABAPENTIN 100 MG: 100 CAPSULE ORAL at 20:12

## 2024-09-24 RX ADMIN — ACETAMINOPHEN 975 MG: 325 TABLET ORAL at 13:33

## 2024-09-24 RX ADMIN — LEVETIRACETAM 500 MG: 500 TABLET, FILM COATED ORAL at 08:13

## 2024-09-24 RX ADMIN — CARVEDILOL 12.5 MG: 12.5 TABLET, FILM COATED ORAL at 20:12

## 2024-09-24 RX ADMIN — DULOXETINE HYDROCHLORIDE 20 MG: 20 CAPSULE, DELAYED RELEASE ORAL at 08:14

## 2024-09-24 RX ADMIN — CEFTRIAXONE SODIUM 1 G: 1 INJECTION, POWDER, FOR SOLUTION INTRAMUSCULAR; INTRAVENOUS at 20:13

## 2024-09-24 RX ADMIN — LEVOTHYROXINE SODIUM 75 MCG: 75 TABLET ORAL at 08:13

## 2024-09-24 RX ADMIN — HEPARIN SODIUM 5000 UNITS: 5000 INJECTION, SOLUTION INTRAVENOUS; SUBCUTANEOUS at 05:12

## 2024-09-24 RX ADMIN — LEVETIRACETAM 500 MG: 500 TABLET, FILM COATED ORAL at 20:12

## 2024-09-24 RX ADMIN — HEPARIN SODIUM 5000 UNITS: 5000 INJECTION, SOLUTION INTRAVENOUS; SUBCUTANEOUS at 13:33

## 2024-09-24 RX ADMIN — CARVEDILOL 12.5 MG: 12.5 TABLET, FILM COATED ORAL at 08:14

## 2024-09-24 ASSESSMENT — ACTIVITIES OF DAILY LIVING (ADL)
ADLS_ACUITY_SCORE: 38
ADLS_ACUITY_SCORE: 53
ADLS_ACUITY_SCORE: 38
ADLS_ACUITY_SCORE: 53
ADLS_ACUITY_SCORE: 38
ADLS_ACUITY_SCORE: 53
ADLS_ACUITY_SCORE: 38
ADLS_ACUITY_SCORE: 53

## 2024-09-24 NOTE — PROGRESS NOTES
[facility]   Tertiary Survey Progress Note     Date of Service (when I saw the patient): 09/24/2024     Assessment & Plan   Trauma mechanism: Fall from standing  Time/date of injury: 9/20/24     Known Injuries:  Acute minimally displaced fracture of the left posterior 11th rib   Stable chronic displaced right 9-10th rib fractures     Other diagnoses:  Trace bilateral pleural effusions   UTI with chronic kidney failure     Neuro/Pain:  # Vascular dementia  # Hx CVA  # Acute pain  Tylenol, Lidoderm patches and PRN Oxycodone for severe pain    - Monitor neurological status.   - Maintain circadian rhythm.  Lights on during the day.  Off at night, minimize cares at night.  OOB during the day.    Pulmonary:  # Left posterior 11th rib fracture  Pain control, pulmonary hygiene, early mobility    Cardiovascular:    #HTN  Slightly hypertensive due to pain  Continue Amlodipine and Carvedilol    GI/Nutrition:    Regular diet + bowel regimen    Renal/ Fluids/Electrolytes:  # chronic kidney injury  - MIVF for IV fluid hydration.   - electrolyte replacement protocol  In place.     Endocrine:  # Diabetes Mellitus type I  # Stress hyperglycemia  Insulin gtt transition to PTA regimen once under control  - PTA medications: Glargine, Novolog sliding scale     Infectious disease:   #UTI  Ceftriaxone pending cultures    Hematology:    - Admitted with Hb of 12.4.   - Threshold for transfusion if hgb <7.0 or signs/symptoms of hypoperfusion.       Musculoskeletal:  #falls  - Physical and occupational therapy consults.  Skin:  - dilgent cares to prevent skin breakdown and wound formation.      Lines/ tubes/ drains:  - PIV  Code status: Full code  General Cares:    PPI/H2 blocker:  N/A   DVT prophylaxis: Enoxaperin   Bowel Regimen/Date of last stool: PTA   Pulmonary toilet: IS   ETOH screen completed: yes   Lines / drains: PIV    Discharge goals:   Adequate pain management: yes  VSS x24 hours:  yes  Hemoglobin stable x 48 hours:  Ambulating safely and/or therapy evals complete: yes  Drains/lines removed or plan in place to manage: yes  Teaching done: as able with patient  Other:  Expected D/C date: tomorrow given good pain control   Her daughter Michelle was updated via phone, discussed discharge plan, pain control    Interval History   Left rib pain with cough otherwise no other issues    Review Of Systems  Challenging given limited yes/no responses, hx vascular dementia    Physical Exam     Nina Coma Scale - Total 15/15  Eye Response (E): 4   4= spontaneous, 3= to verbal/voice, 2= to pain, 1= No response   Verbal Response (V): 5   5= Orientated, converses, 4= Confused, converses, 3= Inappropriate words, 2= Incomprehensible sounds, 1=No response   Motor Response (M): 6   6= Obeys commands, 5= Localizes to pain, 4= Withdrawal to pain, 3=Fexion to pain, 2= Extension to pain, 1= No response     Frailty Questionnaire: To be done for all patients age 60+. To be completed on admission or with the tertiary exam  F (Fatigue): Is the patient easily fatigued? YES = 1  R (Resistance): Is the patient unable to walk one flight of stairs? YES = 1  A (Ambulation): Is the patient unable to walk one block? YES = 1  I  (Illness): Does the patient have more than five illnesses? YES = 1  L (Loss of weight): Has the patient lost more than 5% of weight in the past 6 months. NO = 0  Lost five pounds or more in the last 3 months without trying? AND/OR Unintended weight loss?  Does the patient have difficulty performing housework such as washing windows or scrubbing floors? AND Activity in a typical 24-hour day- No moderate or vigorous activity    Score: 4    Score: 0-2: Ensure appropriate therapies consulted if needed     Physical Exam  HENT:      Head: Normocephalic.      Nose: Nose normal.      Mouth/Throat:      Mouth: Mucous membranes are moist.   Cardiovascular:      Rate and Rhythm: Normal rate.   Pulmonary:       Effort: Pulmonary effort is normal.   Abdominal:      General: Abdomen is flat. Bowel sounds are normal.   Musculoskeletal:         General: Normal range of motion.      Cervical back: Normal range of motion.   Skin:     General: Skin is warm and dry.      Capillary Refill: Capillary refill takes less than 2 seconds.   Neurological:      General: No focal deficit present.      Mental Status: She is alert.      Comments: Responds with yes/no answers, limited speech   Psychiatric:         Mood and Affect: Mood normal.       Temp: 97.8  F (36.6  C) Temp src: Oral BP: (!) 174/69 Pulse: 78   Resp: 18 SpO2: 94 % O2 Device: None (Room air)    There were no vitals filed for this visit.  Vital Signs with Ranges  Temp:  [97.8  F (36.6  C)] 97.8  F (36.6  C)  Pulse:  [70-88] 78  Resp:  [18] 18  BP: ()/(54-75) 174/69  SpO2:  [94 %-97 %] 94 %  I/O last 3 completed shifts:  In: 853.33 [I.V.:853.33]  Out: -       PEGGY Belcher CNP  To contact the trauma service use job code pager [phone],   Numeric texts or alpha text through [facility]

## 2024-09-24 NOTE — ED TRIAGE NOTES
Patient required more than one interventions.     Triage Assessment (Adult)       Row Name 09/23/24 8227          Triage Assessment    Airway WDL WDL        Respiratory WDL    Respiratory WDL WDL        Skin Circulation/Temperature WDL    Skin Circulation/Temperature WDL WDL        Cardiac WDL    Cardiac WDL WDL        Peripheral/Neurovascular WDL    Peripheral Neurovascular WDL WDL        Cognitive/Neuro/Behavioral WDL    Cognitive/Neuro/Behavioral WDL WDL

## 2024-09-24 NOTE — PROGRESS NOTES
ED PT Evaluation:      09/24/24 1012   Appointment Info   Signing Clinician's Name / Credentials (PT) Geovany Garcia, PT, DPT   Living Environment   People in Home child(tiffany), adult   Current Living Arrangements apartment   Home Accessibility no concerns   Transportation Anticipated family or friend will provide   Living Environment Comments Lives with daughter who is one of their PCAs   Self-Care   Equipment Currently Used at Home walker, standard;wheelchair, manual;shower chair   Fall history within last six months yes   Number of times patient has fallen within last six months 1   Activity/Exercise/Self-Care Comment Ambulates with FWW in their apt, manual wc for longer distances. PCA's assist with all I/ADLs prn. Daughter, Mel, is one of the patients PCA's, the patient also has another PCA that comes into the home 30 hrs a week.   General Information   Onset of Illness/Injury or Date of Surgery 09/23/24   Referring Physician Deann Maher MD   Patient/Family Therapy Goals Statement (PT) Return home   Pertinent History of Current Problem (include personal factors and/or comorbidities that impact the POC) Per EMR: Isabell Matthews is a 64 year old female with history of early onset vascular dementia, HTN, CKD, hypothroidism, HLD, T1DM, seizure who presents after a fall on 9/20/24 where she lost her balance after bending over to get her shoes, sliding against the chair along her left side and landing on her buttock.   Existing Precautions/Restrictions fall   General Observations Pt supine, pleasant, agreeable to session.   Cognition   Affect/Mental Status (Cognition) flat/blunted affect   Cognitive Status Comments Alert and engages. Follows commands with delay.   Posture    Posture Kyphosis   Range of Motion (ROM)   Range of Motion ROM is WFL   Strength (Manual Muscle Testing)   Strength (Manual Muscle Testing) Deficits observed during functional mobility   Strength Comments Grossly deconditioned; moves all  extremities against gravity; no focal deficits noted   Bed Mobility   Comment, (Bed Mobility) Danny supine>sit   Transfers   Comment, (Transfers) CGA sit<>stand, FWW   Gait/Stairs (Locomotion)   Comment, (Gait/Stairs) CGA, FWW   Balance   Balance Comments IND sitting; SB-CGA static/dynamic stance with FWW   Clinical Impression   Criteria for Skilled Therapeutic Intervention Yes, treatment indicated   PT Diagnosis (PT) Impaired functional mobility   Influenced by the following impairments pain, deconditioning   Functional limitations due to impairments bed mobility, transfers, gait, activity tolerance   Clinical Presentation (PT Evaluation Complexity) stable   Clinical Presentation Rationale Clinical judgment   Clinical Decision Making (Complexity) low complexity   Planned Therapy Interventions (PT) balance training;bed mobility training;gait training;home exercise program;patient/family education;strengthening;transfer training;risk factor education;home program guidelines;progressive activity/exercise   Risk & Benefits of therapy have been explained evaluation/treatment results reviewed;care plan/treatment goals reviewed;risks/benefits reviewed;current/potential barriers reviewed;participants voiced agreement with care plan;participants included;patient   PT Total Evaluation Time   PT Eval, Low Complexity Minutes (18400) 6   Physical Therapy Goals   PT Frequency Daily   PT Predicted Duration/Target Date for Goal Attainment 10/08/24   PT Goals Bed Mobility;Transfers;Gait   PT: Bed Mobility Supervision/stand-by assist;Supine to/from sit   PT: Transfers Supervision/stand-by assist;Sit to/from stand;Assistive device  (FWW)   PT: Gait Supervision/stand-by assist;Assistive device;Standard walker;25 feet   PT Discharge Planning   PT Plan daily for home, IND with bed mobility, progress gait   PT Discharge Recommendation (DC Rec) home with assist   PT Rationale for DC Rec Pt appears to be at/nearing baseline. Primarily  limited by pain. Has good support from PCAs and all DME needs met at home. Anticipate pt will be safe to discharge home with continued supports when medically ready.   PT Brief overview of current status Ax1; encourage to sit EOB and go for short walks with nursing   Total Session Time   Total Session Time (sum of timed and untimed services) 6

## 2024-09-25 ENCOUNTER — APPOINTMENT (OUTPATIENT)
Dept: GENERAL RADIOLOGY | Facility: CLINIC | Age: 64
DRG: 206 | End: 2024-09-25
Attending: STUDENT IN AN ORGANIZED HEALTH CARE EDUCATION/TRAINING PROGRAM
Payer: COMMERCIAL

## 2024-09-25 VITALS
HEART RATE: 80 BPM | TEMPERATURE: 97.8 F | RESPIRATION RATE: 16 BRPM | DIASTOLIC BLOOD PRESSURE: 77 MMHG | SYSTOLIC BLOOD PRESSURE: 151 MMHG | OXYGEN SATURATION: 97 %

## 2024-09-25 LAB
ANION GAP SERPL CALCULATED.3IONS-SCNC: 6 MMOL/L (ref 7–15)
BACTERIA UR CULT: NORMAL
BUN SERPL-MCNC: 25.2 MG/DL (ref 8–23)
CALCIUM SERPL-MCNC: 8.6 MG/DL (ref 8.8–10.4)
CHLORIDE SERPL-SCNC: 109 MMOL/L (ref 98–107)
CREAT SERPL-MCNC: 1.48 MG/DL (ref 0.51–0.95)
EGFRCR SERPLBLD CKD-EPI 2021: 39 ML/MIN/1.73M2
GLUCOSE BLDC GLUCOMTR-MCNC: 101 MG/DL (ref 70–99)
GLUCOSE BLDC GLUCOMTR-MCNC: 119 MG/DL (ref 70–99)
GLUCOSE BLDC GLUCOMTR-MCNC: 173 MG/DL (ref 70–99)
GLUCOSE BLDC GLUCOMTR-MCNC: 187 MG/DL (ref 70–99)
GLUCOSE BLDC GLUCOMTR-MCNC: 204 MG/DL (ref 70–99)
GLUCOSE BLDC GLUCOMTR-MCNC: 229 MG/DL (ref 70–99)
GLUCOSE BLDC GLUCOMTR-MCNC: 372 MG/DL (ref 70–99)
GLUCOSE BLDC GLUCOMTR-MCNC: 62 MG/DL (ref 70–99)
GLUCOSE BLDC GLUCOMTR-MCNC: 64 MG/DL (ref 70–99)
GLUCOSE BLDC GLUCOMTR-MCNC: 66 MG/DL (ref 70–99)
GLUCOSE BLDC GLUCOMTR-MCNC: 72 MG/DL (ref 70–99)
GLUCOSE BLDC GLUCOMTR-MCNC: 77 MG/DL (ref 70–99)
GLUCOSE BLDC GLUCOMTR-MCNC: 79 MG/DL (ref 70–99)
GLUCOSE BLDC GLUCOMTR-MCNC: 91 MG/DL (ref 70–99)
GLUCOSE SERPL-MCNC: 235 MG/DL (ref 70–99)
HCO3 SERPL-SCNC: 27 MMOL/L (ref 22–29)
MAGNESIUM SERPL-MCNC: 1.9 MG/DL (ref 1.7–2.3)
PHOSPHATE SERPL-MCNC: 3.5 MG/DL (ref 2.5–4.5)
POTASSIUM SERPL-SCNC: 4.2 MMOL/L (ref 3.4–5.3)
SODIUM SERPL-SCNC: 142 MMOL/L (ref 135–145)

## 2024-09-25 PROCEDURE — 250N000012 HC RX MED GY IP 250 OP 636 PS 637: Performed by: NURSE PRACTITIONER

## 2024-09-25 PROCEDURE — 250N000011 HC RX IP 250 OP 636: Performed by: STUDENT IN AN ORGANIZED HEALTH CARE EDUCATION/TRAINING PROGRAM

## 2024-09-25 PROCEDURE — 250N000013 HC RX MED GY IP 250 OP 250 PS 637: Performed by: SURGERY

## 2024-09-25 PROCEDURE — 82962 GLUCOSE BLOOD TEST: CPT

## 2024-09-25 PROCEDURE — 250N000013 HC RX MED GY IP 250 OP 250 PS 637: Performed by: STUDENT IN AN ORGANIZED HEALTH CARE EDUCATION/TRAINING PROGRAM

## 2024-09-25 PROCEDURE — 83735 ASSAY OF MAGNESIUM: CPT | Performed by: STUDENT IN AN ORGANIZED HEALTH CARE EDUCATION/TRAINING PROGRAM

## 2024-09-25 PROCEDURE — 258N000003 HC RX IP 258 OP 636: Performed by: NURSE PRACTITIONER

## 2024-09-25 PROCEDURE — 99239 HOSP IP/OBS DSCHRG MGMT >30: CPT | Performed by: NURSE PRACTITIONER

## 2024-09-25 PROCEDURE — 36415 COLL VENOUS BLD VENIPUNCTURE: CPT | Performed by: STUDENT IN AN ORGANIZED HEALTH CARE EDUCATION/TRAINING PROGRAM

## 2024-09-25 PROCEDURE — 84100 ASSAY OF PHOSPHORUS: CPT | Performed by: STUDENT IN AN ORGANIZED HEALTH CARE EDUCATION/TRAINING PROGRAM

## 2024-09-25 PROCEDURE — 250N000009 HC RX 250: Performed by: NURSE PRACTITIONER

## 2024-09-25 PROCEDURE — 250N000013 HC RX MED GY IP 250 OP 250 PS 637: Performed by: NURSE PRACTITIONER

## 2024-09-25 PROCEDURE — 71045 X-RAY EXAM CHEST 1 VIEW: CPT

## 2024-09-25 PROCEDURE — 71045 X-RAY EXAM CHEST 1 VIEW: CPT | Mod: 26 | Performed by: STUDENT IN AN ORGANIZED HEALTH CARE EDUCATION/TRAINING PROGRAM

## 2024-09-25 PROCEDURE — 80048 BASIC METABOLIC PNL TOTAL CA: CPT | Performed by: STUDENT IN AN ORGANIZED HEALTH CARE EDUCATION/TRAINING PROGRAM

## 2024-09-25 RX ORDER — CEPHALEXIN 500 MG/1
500 CAPSULE ORAL EVERY 6 HOURS
Qty: 2 CAPSULE | Refills: 0 | Status: SHIPPED | OUTPATIENT
Start: 2024-09-25 | End: 2024-09-26

## 2024-09-25 RX ORDER — METHOCARBAMOL 500 MG/1
500 TABLET, FILM COATED ORAL EVERY 6 HOURS PRN
Qty: 10 TABLET | Refills: 0 | Status: SHIPPED | OUTPATIENT
Start: 2024-09-25 | End: 2024-10-07

## 2024-09-25 RX ORDER — LIDOCAINE 4 G/G
1 PATCH TOPICAL EVERY 24 HOURS
Qty: 10 PATCH | Refills: 0 | Status: SHIPPED | OUTPATIENT
Start: 2024-09-25

## 2024-09-25 RX ORDER — ACETAMINOPHEN 325 MG/1
975 TABLET ORAL EVERY 8 HOURS
COMMUNITY
Start: 2024-09-25

## 2024-09-25 RX ORDER — HYDRALAZINE HYDROCHLORIDE 20 MG/ML
10 INJECTION INTRAMUSCULAR; INTRAVENOUS EVERY 4 HOURS PRN
Status: DISCONTINUED | OUTPATIENT
Start: 2024-09-25 | End: 2024-09-25 | Stop reason: HOSPADM

## 2024-09-25 RX ORDER — CEPHALEXIN 500 MG/1
500 CAPSULE ORAL EVERY 6 HOURS SCHEDULED
Status: DISCONTINUED | OUTPATIENT
Start: 2024-09-25 | End: 2024-09-25 | Stop reason: HOSPADM

## 2024-09-25 RX ADMIN — HYDRALAZINE HYDROCHLORIDE 10 MG: 20 INJECTION INTRAMUSCULAR; INTRAVENOUS at 02:31

## 2024-09-25 RX ADMIN — GABAPENTIN 100 MG: 100 CAPSULE ORAL at 08:11

## 2024-09-25 RX ADMIN — ACETAMINOPHEN 975 MG: 325 TABLET ORAL at 11:46

## 2024-09-25 RX ADMIN — INSULIN ASPART 5 UNITS: 100 INJECTION, SOLUTION INTRAVENOUS; SUBCUTANEOUS at 12:41

## 2024-09-25 RX ADMIN — HEPARIN SODIUM 5000 UNITS: 5000 INJECTION, SOLUTION INTRAVENOUS; SUBCUTANEOUS at 11:47

## 2024-09-25 RX ADMIN — INSULIN GLARGINE 5 UNITS: 100 INJECTION, SOLUTION SUBCUTANEOUS at 10:12

## 2024-09-25 RX ADMIN — AMLODIPINE BESYLATE 5 MG: 5 TABLET ORAL at 08:09

## 2024-09-25 RX ADMIN — ATORVASTATIN CALCIUM 40 MG: 40 TABLET, FILM COATED ORAL at 08:10

## 2024-09-25 RX ADMIN — Medication 1000 MCG: at 08:11

## 2024-09-25 RX ADMIN — DEXTROSE 15 G: 15 GEL ORAL at 02:23

## 2024-09-25 RX ADMIN — DULOXETINE HYDROCHLORIDE 20 MG: 20 CAPSULE, DELAYED RELEASE ORAL at 08:11

## 2024-09-25 RX ADMIN — LEVETIRACETAM 500 MG: 500 TABLET, FILM COATED ORAL at 08:12

## 2024-09-25 RX ADMIN — ASPIRIN 81 MG CHEWABLE TABLET 81 MG: 81 TABLET CHEWABLE at 08:44

## 2024-09-25 RX ADMIN — HEPARIN SODIUM 5000 UNITS: 5000 INJECTION, SOLUTION INTRAVENOUS; SUBCUTANEOUS at 03:47

## 2024-09-25 RX ADMIN — ACETAMINOPHEN 975 MG: 325 TABLET ORAL at 03:47

## 2024-09-25 RX ADMIN — GABAPENTIN 100 MG: 100 CAPSULE ORAL at 13:31

## 2024-09-25 RX ADMIN — DEXTROSE 15 G: 15 GEL ORAL at 02:57

## 2024-09-25 RX ADMIN — DEXTROSE AND SODIUM CHLORIDE: 5; 900 INJECTION, SOLUTION INTRAVENOUS at 08:51

## 2024-09-25 RX ADMIN — LEVOTHYROXINE SODIUM 75 MCG: 75 TABLET ORAL at 08:12

## 2024-09-25 RX ADMIN — INSULIN ASPART 2 UNITS: 100 INJECTION, SOLUTION INTRAVENOUS; SUBCUTANEOUS at 10:09

## 2024-09-25 RX ADMIN — CARVEDILOL 12.5 MG: 12.5 TABLET, FILM COATED ORAL at 08:11

## 2024-09-25 RX ADMIN — LORATADINE 10 MG: 10 TABLET ORAL at 08:12

## 2024-09-25 RX ADMIN — LIDOCAINE 1 PATCH: 4 PATCH TOPICAL at 08:12

## 2024-09-25 RX ADMIN — DEXTROSE 15 G: 15 GEL ORAL at 02:43

## 2024-09-25 RX ADMIN — SENNOSIDES AND DOCUSATE SODIUM 1 TABLET: 8.6; 5 TABLET ORAL at 08:44

## 2024-09-25 ASSESSMENT — ACTIVITIES OF DAILY LIVING (ADL)
ADLS_ACUITY_SCORE: 51
ADLS_ACUITY_SCORE: 50
ADLS_ACUITY_SCORE: 51
ADLS_ACUITY_SCORE: 50
ADLS_ACUITY_SCORE: 51
ADLS_ACUITY_SCORE: 50
ADLS_ACUITY_SCORE: 51

## 2024-09-25 NOTE — PROVIDER NOTIFICATION
To Dr. Agrawal, Trauma  RM ED 20  Got a pop up in epic that her IS volume was 500mL this morning, it wanted me to notify the provider due to decompensation risk. VSS currently, I am aware the she has L rib fxs  Ashley Romeo  [phone]

## 2024-09-25 NOTE — DISCHARGE SUMMARY
Discharge instruction reviewed with daughter. Discharged medications picked up. PIV removed, patient accompanied by daughter via WC to the main lobby. Patient discharged with all belongings and discharge medications.

## 2024-09-25 NOTE — PLAN OF CARE
Physical Therapy Discharge Summary    Reason for therapy discharge:    Discharged to home with home therapy.    Progress towards therapy goal(s). See goals on Care Plan in [facility] electronic health record for goal details.  Goals partially met.  Barriers to achieving goals:   discharge on same date as initial evaluation.    Therapy recommendation(s):    Continued therapy is recommended.  Rationale/Recommendations:  Recommend  PT for patient to progress with activity tolerance and pain management in setting of rib fx Pending pt qualifying; otherwise rec ongoing PRN assist from daughter.

## 2024-09-25 NOTE — CONSULTS
Care Management Discharge Note    Discharge Date: 09/25/2024       Discharge Disposition:      Discharge Services:      Discharge DME:      Discharge Transportation: family or friend will provide    Private pay costs discussed: Not applicable    Does the patient's insurance plan have a 3 day qualifying hospital stay waiver?  No    PAS Confirmation Code:    Patient/family educated on Medicare website which has current facility and service quality ratings:      Education Provided on the Discharge Plan:  yes  Persons Notified of Discharge Plans: Patient's daughter  Patient/Family in Agreement with the Plan:  yes    Handoff Referral Completed: Yes, [facility] PCP: Internal handoff referral completed    Additional Information:  RNCC consulted by Trauma provider for discharge planning, to call daughter back regarding plan. Patient comes from home with daughter, will help patient at discharge time. Patient with PT recs for [facility], will not qualify for HC at this time, patient's daughter understands, is agreeable to plan, no further needs at this time. RNCC to cease following.    Patricio Moore BSN, BA, RN, CMSRN, RNCC  [facility]  Covering Units 6C Beds 5877-4648/OBS  Phone: [phone]  Available on [employer] search 6C 6502-14 RNCC or OBS RNCC  After Hours [phone]     6C Beds 6973-3596  Ph: [phone]     6B/CRNCC Weekend/holiday on [employer] or [phone]     [facility] RNCC ED/5 Ortho/5 Med/Surg [phone]     [facility] RNCC 6 Med/Surg 8A, [phone]     Observation SW and weekend/after hours phone: [phone]

## 2024-09-25 NOTE — PROVIDER NOTIFICATION
To House physician surgical service, Trauma   ED 20  BP in R arm was 188/72, in L arm was 184/73, order to notify when SBP greater than 180, also, BG have been below 150 for four consecutive hrs, can we d/c insulin gtt?  Ashley Romeo  [phone]    Prn IV hydralazine ordered and given, will pass insulin gtt discharge to day team

## 2024-09-25 NOTE — DISCHARGE SUMMARY
[facility]    Discharge Summary  Trauma Surgery Service    Date of Admission:  9/23/2024  Date of Discharge:  9/25/2024  2:33 PM  Attending Physician: Dr. Tay León  Discharging Provider: Dasha Riggs CNP  Date of Service (when I saw the patient): 09/25/24    Primary Provider: Bony Castaneda  Primary Care clinic: [address] [place] [id number]  Phone: [phone]  Fax number: [phone]     Discharge Diagnoses   Closed fracture of one rib of left side, initial encounter  Vascular dementia, uncomplicated  Accidental fall, initial encounter  Urinary tract infection in female  Closed fracture of one rib of right side, initial encounter  History of diabetes with ketoacidosis  History of CVA (cerebrovascular accident)    Hospital Course     Isabell Matthews is a 64 year old female with history of early onset vascular dementia, HTN, CKD, hypothroidism, HLD, T1DM, seizure who presents after a fall on 9/20/24 where she lost her balance after bending over to get her shoes, sliding against the chair along her left side and landing on her buttock. No reported head strike or +LOC. This fall was witnessed by her daughter. She presented 2 days later with worsening left sided rib pain. Trauma work up notable for a left 11th rib fracture without pneumothorax.  She was also found to have a UTI. Admitted to trauma fro pain control.    Acute minimally displaced fracture of the left posterior 11th rib   In rib fracture management, pulmonary toilet and good pain control allowing for good pulmonary toilet is paramount.  Prior to discharge, her pain was controlled for Isabell Matthews with scheduled acetaminophen and topical pain medications.     In order to prevent common pulmonary complications found with rib fracture patients, Isabell Matthews will need to continue with aggressive pulmonary toileting that includes, incentive spirometry, coughing and deep breathing exercises.  We  recommend these continue at a minimum of QID for one month after discharge.  Patient has been provided for handout with instructions regarding this.     UTI  UA with large Leuk est and pyuria, cultures with mixed urogenital isaias. She was started on Ceftriaxone pending cultures and should complete treatment today. (Keflex 500mg TID)    Continue home medications.  Therapy Recommendations:   Current status of physical therapies on discharge:   Home with assist    Unresulted Labs Ordered in the Past 30 Days of this Admission       No orders found from 8/24/2024 to 9/24/2024.          Code Status   Full Code    SUBJECTIVE: Prior to discharge Lisa reported adequate pain control. Denied shortness of breath. The discharge and follow up plan was discussed with her daughter Michelle.    Physical Exam   Temp: 97.8  F (36.6  C) Temp src: Oral BP: (!) 151/77 Pulse: 80   Resp: 16 SpO2: 97 % O2 Device: None (Room air)    There were no vitals filed for this visit.  Vital Signs with Ranges  Temp:  [96.9  F (36.1  C)-97.8  F (36.6  C)] 97.8  F (36.6  C)  Pulse:  [69-80] 80  Resp:  [12-16] 16  BP: (151-192)/(62-88) 151/77  SpO2:  [96 %-97 %] 97 %  I/O last 3 completed shifts:  In: 1280 [P.O.:1280]  Out: 4000 [Urine:4000]    Constitutional: Awake, alert, cooperative, no apparent distress  ENT: Normocephalic  Respiratory: No increased work of breathing, good air exchange, clear to auscultation bilaterally, no crackles or wheezing.  Cardiovascular: Normal apical impulse, regular rate and rhythm, normal S1 and S2  GI: Normal bowel sounds, soft, non-distended  Genitourinary:  Incontinent   Skin: Warm and dry  Musculoskeletal: There is no redness, warmth, or swelling of the joints.  Full range of motion noted.  Motor strength is 5 out of 5 all extremities bilaterally.  Tone is normal.  Neurologic: Awake, alert, oriented to self. Responds yes/no to most questions. Limited speech  Neuropsychiatric: Calm, normal eye contact, alert, normal  "affect    Discharge Disposition   Discharged to home  Condition at discharge: Stable  Discharge VS: Blood pressure (!) 151/77, pulse 80, temperature 97.8  F (36.6  C), temperature source Oral, resp. rate 16, SpO2 97%, not currently breastfeeding.    Consultations This Hospital Stay   PHYSICAL THERAPY ADULT IP CONSULT  CARE MANAGEMENT / SOCIAL WORK IP CONSULT  PHARMACY IP CONSULT  NURSING TO CONSULT FOR VASCULAR ACCESS CARE IP CONSULT  OCCUPATIONAL THERAPY ADULT IP CONSULT    Discharge Orders      Home Care Referral      Reason for your hospital stay    Fall  Urinary tract infection  Acute minimally displaced fracture of the left posterior 11th rib     Activity    Your activity upon discharge: activity as tolerated     Follow Up ([facility])    Follow up with your primary care provider for continued medical care and hospital follow up in 5-10 days.    You have been involved in a recent trauma incident resulting in an injury.  Studies show us that people affected by trauma have higher levels of post-traumatic stress disorder (PTSD) and/or depressive symptoms during the year following an injury.     Please consider the following.  Have you:  Had migraines about the event(s) or thought about the event(s) when you didn't want to?  Tried hard not to think about the event(s) or went out of your way to avoid situations that reminded you of the event(s)?  Been constantly on guard, watchful, or easily startled?  Felt numb or detached from people, activities, or your surroundings?   Felt guilty or unable to stop blaming yourself or others for the event(s) or any problems the event (s) may have caused?    If you answered \"yes\" to 3 or more of these questions, or if you simply want to discuss any of your feelings further, we recommend that you talk with your Primary Care Provider or a mental health professional.        Appointments on [facility] and/or [facility] (with [facility] or [facility] provider or service). Call [phone] if " you haven't heard regarding these appointments within 7 days of discharge.     Diet    Follow this diet upon discharge: Regular     Discharge Medications   Discharge Medication List as of 9/25/2024  1:39 PM        START taking these medications    Details   acetaminophen (TYLENOL) 325 MG tablet Take 3 tablets (975 mg) by mouth every 8 hours., OTC      cephALEXin (KEFLEX) 500 MG capsule Take 1 capsule (500 mg) by mouth every 6 hours for 2 doses., Disp-2 capsule, R-0, E-Prescribe      Lidocaine (LIDOCARE) 4 % Patch Place 1 patch over 12 hours onto the skin every 24 hours. To prevent lidocaine toxicity, patient should be patch free for 12 hrs daily.Disp-10 patch, [id number]      methocarbamol (ROBAXIN) 500 MG tablet Take 1 tablet (500 mg) by mouth every 6 hours as needed for muscle spasms., Disp-10 tablet, R-0, E-Prescribe           CONTINUE these medications which have CHANGED    Details   insulin glargine (LANTUS PEN) 100 UNIT/ML pen Inject 14 Units subcutaneously at bedtime., Disp-15 mL, R-1, E-PrescribeIf Lantus is not covered by insurance, may substitute Basaglar or Semglee or other insulin glargine product per insurance preference at same dose and frequency.             CONTINUE these medications which have NOT CHANGED    Details   amLODIPine (NORVASC) 5 MG tablet Take 1 tablet (5 mg) by mouth daily, Disp-90 tablet, R-3, E-Prescribe      aspirin (ASA) 81 MG chewable tablet Take 1 tablet (81 mg) by mouth daily, Disp-90 tablet, R-3, E-Prescribe      atorvastatin (LIPITOR) 40 MG tablet TAKE 1 TABLET(40 MG) BY MOUTH DAILY, Disp-90 tablet, R-3, E-Prescribe      blood glucose (TRUE METRIX BLOOD GLUCOSE TEST) test strip USE TO TEST BLOOD SUGAR 4 TO 5 TIMES DAILY OR AS DIRECTED, Disp-400 strip, R-3, E-Prescribe      blood glucose monitoring (NO BRAND SPECIFIED) meter device kit Use to test blood sugar 4 times daily.  Please provide glucose meter that is covered by insurance.Disp-1 kit, [id number]      carvedilol  (COREG) 12.5 MG tablet Take 1 tablet (12.5 mg) by mouth 2 times daily (with meals), Disp-180 tablet, R-3, E-Prescribe      cyanocobalamin (VITAMIN B-12) 1000 MCG tablet Take 1 tablet (1,000 mcg) by mouth daily, Disp-100 tablet, R-3, E-Prescribe      DULoxetine (CYMBALTA) 20 MG capsule Take 1 capsule (20 mg) by mouth daily, Disp-90 capsule, R-3, E-Prescribe      gabapentin (NEURONTIN) 100 MG capsule Take 1 capsule (100 mg) by mouth at bedtime, Disp-90 capsule, R-3, E-Prescribe      Glucagon (GVOKE HYPOPEN) 1 MG/0.2ML pen Inject the contents of 1 device under the skin into lower abdomen, outer thigh, or outer upper arm as needed for hypoglycemia. If no response after 15 minutes, additional 1 mg dose from a new device may be injected while waiting for emergency assistance. , Disp-2 each, R-0, E-PrescribeDiabetes with hypoglycemia      insulin aspart (NOVOLOG FLEXPEN) 100 UNIT/ML pen Inject 2-4 units with meals plus correction scale 1 unit per 100 >200 three times daily before meals and at bedtime. For Pre-Meal  - 164 give 1 unit.  For Pre-Meal  - 189 give 2 units.  For Pre-Meal  - 214 give 3 units.  For Pre-Meal BG  215 - 239 give 4 units.  For Pre-Meal  - 264 give 5 units.  For Pre-Meal  - 289 give 6 units.  For Pre-Meal  - 314 give 7 units.  For Pre-Meal  - 339 give 8 units.  For Pre-Meal  - 364 give 9 units.  For Pre-Meal BG greater  than or equal to 365 give 10 units. FOR BEDTIME:  For  - 224 give 1 unit.  For  - 249 give 2 units.  For  - 274 give 3 units.  For  - 299 give 4 units.  For  - 324 give 5 units.  For  - 349 give 6 units.  For BG great er than or equal to 350 give 7 units., Disp-15 mL, R-4, E-Prescribe      insulin aspart (NOVOLOG PENFILL) 100 UNIT/ML cartridge Inject 2-4 Units Subcutaneous 4 times daily (with meals and nightly) diabetes, Disp-15 mL, R-11, E-Prescribe      insulin pen needle (31G X 8 MM) 31G X 8 MM  miscellaneous Use 4 pen needles daily or as directed.Disp-300 each, [id number]      levETIRAcetam (KEPPRA) 500 MG tablet Take 1 tablet (500 mg) by mouth 2 times daily, Disp-180 tablet, R-3, E-Prescribe      loratadine (CLARITIN) 10 MG tablet Take 1 tablet (10 mg) by mouth daily, Disp-90 tablet, R-3, E-Prescribe      SYNTHROID 75 MCG tablet Take 1 tablet (75 mcg) by mouth daily, Disp-90 tablet, R-3, RAMÍREZ, E-Prescribe      zinc oxide (DESITIN) 20 % external ointment Apply topically as needed for dry skin or irritationDisp-85 g, [id number]           STOP taking these medications       clopidogrel (PLAVIX) 75 MG tablet Comments:   Reason for Stopping:             Allergies   Allergies   Allergen Reactions    Seasonal Allergies      Data   Most Recent 3 CBC's:  Recent Labs   Lab Test 09/23/24  1325 08/28/24  0643 08/25/24  0850   WBC 7.7 6.4 6.1   HGB 12.4 12.5 12.6   MCV 90 88 87    207 222      Most Recent 3 BMP's:  Recent Labs   Lab Test 09/25/24  1231 09/25/24  0927 09/25/24  0745 09/25/24  0704 09/24/24  0755 09/24/24  0710 09/23/24  2154 09/23/24  1325   NA  --   --   --  142  --  137  --  140   POTASSIUM  --   --   --  4.2  --  4.5  --  4.3   CHLORIDE  --   --   --  109*  --  104  --  104   CO2  --   --   --  27  --  24  --  26   BUN  --   --   --  25.2*  --  30.9*  --  28.9*   CR  --   --   --  1.48*  --  1.63*  --  1.46*   ANIONGAP  --   --   --  6*  --  9  --  10   VERONICA  --   --   --  8.6*  --  7.9*  --  9.2   * 229* 173* 235*   < > 450*   < > 183*    < > = values in this interval not displayed.     Most Recent 2 LFT's:  Recent Labs   Lab Test 09/23/24  1325 08/24/24  0601   AST 20 16   ALT 13 7   ALKPHOS 142 145   BILITOTAL 0.2 0.2     Most Recent INR's and Anticoagulation Dosing History:  Anticoagulation Dose History  More data exists         Latest Ref Rng & Units 4/10/2022 5/3/2022 12/10/2022 9/4/2023 3/7/2024 6/16/2024 9/23/2024   Recent Dosing and Labs   INR 0.85 - 1.15 0.93  1.1  1.3   1.00  1.1  0.98  1.1  0.99  0.98  1.02       Details          Multiple values from one day are sorted in reverse-chronological order             Most Recent 3 Troponin's:  Recent Labs   Lab Test 06/21/21  1618   TROPI <0.015     Most Recent 6 Bacteria Isolates From Any Culture (See EPIC Reports for Culture Details):  Recent Labs   Lab Test 06/23/21  2145 09/27/20  2236 09/27/20  1906 09/27/20  1648 09/27/20  1617   CULT >100,000 colonies/mL  mixed urogenital isaias  Susceptibility testing not routinely done    Multiple morphotypes present with no predominant organism.  Growth consistent with   probable contamination during collection.  Suggest repeat specimen if clinically   indicated.   No growth No growth No growth No growth     Most Recent TSH, T4 and A1c Labs:  Recent Labs   Lab Test 09/23/24  1325 05/20/24  1538 03/29/23  1158 12/12/22  0809   TSH  --  0.80   < >  --    T4  --   --   --  0.86*   A1C 11.2* 10.2*   < >  --     < > = values in this interval not displayed.     Results for orders placed or performed during the hospital encounter of 09/23/24   CT Chest Abdomen Pelvis w/o Contrast    Narrative    EXAMINATION: CT CHEST ABDOMEN PELVIS W/O CONTRAST, 9/23/2024 4:02 PM    TECHNIQUE:  Helical CT images from the thoracic inlet through the  symphysis pubis were obtained without IV contrast.     COMPARISON: CT 3/7/2024, 9/4/2023    HISTORY: fall, left flank pain    FINDINGS:  CHEST:  Chest limited by cardiopulmonary motion artifact.    THYROID: Thyroid is unremarkable.    LUNGS/PLEURA: Trace bilateral pleural effusions. Subsegmental  atelectasis in the lung bases. No pneumothorax. Left lower lobe  subcentimeter calcified granuloma. The airway is patent.    MEDIASTINUM: Heart size is within normal limits. Small pericardial  effusion, unchanged from 3/7/2024. No suspicious mediastinal  lymphadenopathy.  The esophagus is unremarkable.    CHEST WALL: No suspicious axillary  lymphadenopathy.    ABDOMEN/PELVIS:  LIVER: No focal hepatic mass.    BILIARY: Normal appearance of the gallbladder. No intra- or  extrahepatic biliary dilation.    PANCREAS: No focal pancreatic mass or ductal dilation.    SPLEEN: No splenic mass.    ADRENAL GLANDS: Subcentimeter left adrenal thickening versus nodule  measuring less than 10 Hounsfield units, likely represents benign  adrenal adenoma (7/116).    URINARY TRACT: Grossly stable bilateral renal cysts. No suspicious  renal mass, renal calculi, or hydronephrosis. Hilar vascular  calcifications. No bladder wall thickening.    REPRODUCTIVE ORGANS: Stable well-circumscribed soft tissue mass along  the posterior aspect of the uterus, likely pedunculated subserosal  fibroid.    STOMACH: Within normal limits.    BOWEL: Moderate colonic stool burden. Normal caliber of the small and  large bowel.  Normal appendix.    PERITONEUM/FLUID: Trace mesenteric edema. No focal fluid collection or  evidence of free fluid or free air.    VESSELS: No aneurysmal dilatation of the abdominal aorta. Bulky  calcifications of the origins of the aortic arch great vessels and  infrarenal abdominal aorta.     LYMPH NODES: No lymphadenopathy.    BONES/SOFT TISSUES: Minimally displaced acute fracture the left  posterior 11th rib. Chronic displaced right ninth and 10th posterior  rib fractures. Unchanged grade 1 anterolisthesis of C7 on T1.  Multilevel cervical and thoracic endplate sclerotic changes and  vertebral disc height loss. Lower lumbar facet arthropathy. Bilateral  total hip arthroplasties.      Impression    IMPRESSION:  1.   Acute minimally displaced fracture of the left posterior 11th rib  without evidence of pneumothorax. Stable chronic displaced right ninth  and 10th rib fractures.   2.  Trace bilateral pleural effusions with subsegmental atelectasis in  the lung bases.  3.  Unchanged small pericardial effusion compared to 3/7/2024.  4.  No acute intra-abdominal  pathology.    I have personally reviewed the examination and initial interpretation  and I agree with the findings.    MARIA DE JESUS ARIAS DO         SYSTEM ID:  [id number]   XR Chest 1 View    Narrative    EXAM: XR CHEST 1 VIEW  LOCATION: [facility]  DATE: 9/24/2024    INDICATION: Trauma Patient with Rib Fracture(s)  COMPARISON: 08/23/2024      Impression    IMPRESSION: Stable cardiomediastinal silhouette. Bibasilar atelectasis or infiltrate. No significant effusion. No visible pneumothorax.       Time Spent on this Encounter   I, PEGGY Belcher CNP, personally saw the patient today and spent greater than 30 minutes discharging this patient.    We appreciate the opportunity to care for your patient while in the hospital.  Should you have any questions about their injuries or this discharge summary our contact information is below.    Trauma Services  [facility]   Department of Critical Care and Acute Care Surgery  [address] [place] [address] [address]  [place] [address]  Office: [phone]

## 2024-09-25 NOTE — PLAN OF CARE
Goal Outcome Evaluation:      Plan of Care Reviewed With: patient    Overall Patient Progress: no change      Pt was hyperglycemic, hypoglycemic (60s-70s), then hyperglycemic, on insulin gtt (Alg 2), reports pain with movement but declined meds, incentive spirometer use encouraged, hypertensive at times (SBP 180s), PRN hydralazine ordered and administered, other VSS, intermittently confused, slow speech, takes time to respond, but able to make needs known, R PIV infusing insulin/ D5NS, L PIV SL

## 2024-09-26 ENCOUNTER — PATIENT OUTREACH (OUTPATIENT)
Dept: CARE COORDINATION | Facility: CLINIC | Age: 64
End: 2024-09-26
Payer: COMMERCIAL

## 2024-09-26 NOTE — PROGRESS NOTES
Clinic Care Coordination Contact  [facility]/Voicemail    Clinical Data: Care Coordinator Outreach    Outreach Documentation Number of Outreach Attempt   8/29/2024  10:00 AM 1   8/29/2024  10:03 AM 1   9/3/2024   9:42 AM 2   10/1/2024  10:43 AM 1     Left message on patient's daughter's voicemail with call back information and requested return call. Patient's daughter is caregiver, though no consent to communicate on file. RN left non-detailed VM requesting scheduling help for hospital follow up appointment and inquiring to speak with patient. RN informed would also send [employer] message as well.     Routed to Dr Castaneda as well to see if pt can fill out HCD at next visit (if capable with cognitive capacity) and to see if PCP has had any concerns regarding patient's frequent hospitalizations, concerns with care at home in regards to upcoming visit on 10/7.     Plan: Care Coordinator will await for pt's reply on [employer] for further outreach.    JESSICA MCCABE RN on 10/1/2024 at 10:44 AM    Addendum:  No concerns from PCP. RN will await pt's reply on Iagnosis.     JESSICA MCCABE RN on 10/3/2024 at 10:49 AM    Addendum:  Pt saw PCP on 10/7 for hospital follow up visit, no new care coordination needs. RN to close program.     JESSICA MCCABE RN on 10/8/2024 at 9:38 AM

## 2024-10-07 ENCOUNTER — MYC MEDICAL ADVICE (OUTPATIENT)
Dept: FAMILY MEDICINE | Facility: CLINIC | Age: 64
End: 2024-10-07

## 2024-10-07 ENCOUNTER — OFFICE VISIT (OUTPATIENT)
Dept: FAMILY MEDICINE | Facility: CLINIC | Age: 64
End: 2024-10-07
Payer: COMMERCIAL

## 2024-10-07 VITALS
HEART RATE: 82 BPM | SYSTOLIC BLOOD PRESSURE: 136 MMHG | DIASTOLIC BLOOD PRESSURE: 84 MMHG | OXYGEN SATURATION: 96 % | BODY MASS INDEX: 23.01 KG/M2 | WEIGHT: 129.9 LBS | TEMPERATURE: 97.7 F

## 2024-10-07 DIAGNOSIS — S22.31XA CLOSED FRACTURE OF ONE RIB OF RIGHT SIDE, INITIAL ENCOUNTER: ICD-10-CM

## 2024-10-07 DIAGNOSIS — E78.5 HYPERLIPIDEMIA LDL GOAL <70: ICD-10-CM

## 2024-10-07 DIAGNOSIS — F01.53 VASCULAR DEMENTIA WITH DEPRESSED MOOD (H): ICD-10-CM

## 2024-10-07 DIAGNOSIS — N18.32 STAGE 3B CHRONIC KIDNEY DISEASE (H): ICD-10-CM

## 2024-10-07 DIAGNOSIS — R56.9 SEIZURES (H): ICD-10-CM

## 2024-10-07 DIAGNOSIS — E10.59 TYPE 1 DIABETES MELLITUS WITH OTHER CIRCULATORY COMPLICATION (H): Primary | ICD-10-CM

## 2024-10-07 DIAGNOSIS — Z23 ENCOUNTER FOR IMMUNIZATION: ICD-10-CM

## 2024-10-07 DIAGNOSIS — E03.9 ACQUIRED HYPOTHYROIDISM: ICD-10-CM

## 2024-10-07 DIAGNOSIS — I10 ESSENTIAL HYPERTENSION: ICD-10-CM

## 2024-10-07 DIAGNOSIS — Z02.89 ENCOUNTER FOR COMPLETION OF FORM WITH PATIENT: ICD-10-CM

## 2024-10-07 DIAGNOSIS — M79.7 FIBROMYALGIA: ICD-10-CM

## 2024-10-07 DIAGNOSIS — L30.9 DERMATITIS: ICD-10-CM

## 2024-10-07 PROCEDURE — G0008 ADMIN INFLUENZA VIRUS VAC: HCPCS | Performed by: FAMILY MEDICINE

## 2024-10-07 PROCEDURE — 90662 IIV NO PRSV INCREASED AG IM: CPT | Performed by: FAMILY MEDICINE

## 2024-10-07 PROCEDURE — 91320 SARSCV2 VAC 30MCG TRS-SUC IM: CPT | Performed by: FAMILY MEDICINE

## 2024-10-07 PROCEDURE — 90480 ADMN SARSCOV2 VAC 1/ONLY CMP: CPT | Performed by: FAMILY MEDICINE

## 2024-10-07 PROCEDURE — 99215 OFFICE O/P EST HI 40 MIN: CPT | Mod: 25 | Performed by: FAMILY MEDICINE

## 2024-10-07 RX ORDER — DULOXETIN HYDROCHLORIDE 20 MG/1
20 CAPSULE, DELAYED RELEASE ORAL DAILY
Qty: 90 CAPSULE | Refills: 3 | Status: SHIPPED | OUTPATIENT
Start: 2024-10-07

## 2024-10-07 RX ORDER — ATORVASTATIN CALCIUM 40 MG/1
40 TABLET, FILM COATED ORAL DAILY
Qty: 90 TABLET | Refills: 3 | Status: SHIPPED | OUTPATIENT
Start: 2024-10-07

## 2024-10-07 RX ORDER — GABAPENTIN 100 MG/1
100 CAPSULE ORAL AT BEDTIME
Qty: 90 CAPSULE | Refills: 3 | Status: SHIPPED | OUTPATIENT
Start: 2024-10-07

## 2024-10-07 RX ORDER — METHOCARBAMOL 500 MG/1
500 TABLET, FILM COATED ORAL DAILY PRN
Qty: 30 TABLET | Refills: 0 | Status: SHIPPED | OUTPATIENT
Start: 2024-10-07

## 2024-10-07 RX ORDER — LEVETIRACETAM 500 MG/1
500 TABLET ORAL 2 TIMES DAILY
Qty: 180 TABLET | Refills: 3 | Status: SHIPPED | OUTPATIENT
Start: 2024-10-07

## 2024-10-07 RX ORDER — CARVEDILOL 12.5 MG/1
12.5 TABLET ORAL 2 TIMES DAILY WITH MEALS
Qty: 180 TABLET | Refills: 3 | Status: SHIPPED | OUTPATIENT
Start: 2024-10-07

## 2024-10-07 RX ORDER — LORATADINE 10 MG/1
10 TABLET ORAL DAILY
Qty: 90 TABLET | Refills: 3 | Status: SHIPPED | OUTPATIENT
Start: 2024-10-07

## 2024-10-07 RX ORDER — LEVOTHYROXINE SODIUM 75 MCG
75 TABLET ORAL DAILY
Qty: 90 TABLET | Refills: 3 | Status: SHIPPED | OUTPATIENT
Start: 2024-10-07

## 2024-10-07 RX ORDER — AMLODIPINE BESYLATE 5 MG/1
5 TABLET ORAL DAILY
Qty: 90 TABLET | Refills: 3 | Status: SHIPPED | OUTPATIENT
Start: 2024-10-07

## 2024-10-07 RX ORDER — INSULIN ASPART 100 [IU]/ML
INJECTION, SOLUTION INTRAVENOUS; SUBCUTANEOUS
Qty: 30 ML | Refills: 4 | Status: SHIPPED | OUTPATIENT
Start: 2024-10-07

## 2024-10-07 NOTE — PATIENT INSTRUCTIONS
Due for TDAP   Also due for Shingrix vaccine 2 shot series  RSV vaccine   Recommendations in caring for Pamela:    

## 2024-10-07 NOTE — PROGRESS NOTES
Assessment & Plan     Type 1 diabetes mellitus with other circulatory complication (H)  Daughter was concerned about difficulty following the short acting aspart insulin regime considering her mother will be going to a  center some days of the week therefore we went through and simplified the short acting insulin regimen.  I also reviewed it would be important for her to follow-up with diabetes endocrinology to determine if any additional recommendations considering her A1c is quite high.  I discussed need to reestablish primary care with a new provider as I will be leaving the clinic November 1, 2024.  Referrals placed for ophthalmology as they missed their visit.  - Adult Endocrinology  Referral  - insulin glargine (LANTUS PEN) 100 UNIT/ML pen  Dispense: 30 mL; Refill: 1  - insulin aspart (NOVOLOG FLEXPEN) 100 UNIT/ML pen  Dispense: 30 mL; Refill: 4    insulin aspart (NOVOLOG FLEXPEN) 100 UNIT/ML pen 30 mL 4 10/7/2024 -- No   Sig: Inject 2 units with meals plus correction scale additional three times daily before meals  as follows  Pre-Meal  - 200 give additional 1 unit.  For Pre-Meal  -250 give 2 additional units.  For Pre-Meal -300 give 3 additional units.  For Pre-Meal -350 give 4 additional units.  For Pre-Meal -400 give 5 additional units Max 25 units daily     - Glucagon (GVOKE HYPOPEN) 1 MG/0.2ML pen  Dispense: 2 each; Refill: 1  - gabapentin (NEURONTIN) 100 MG capsule  Dispense: 90 capsule; Refill: 3  - Adult Eye  Referral    Stage 3b chronic kidney disease (H)  Reviewed need to optimize glucose control continue to hydrate well in order to maintain renal function.    Vascular dementia with depressed mood (H)  Stable at this time she prefers living with her daughter, is encouraged about the opportunity to potentially socialized in the day program.    Acquired hypothyroidism  Refill provided  - SYNTHROID 75 MCG tablet  Dispense: 90 tablet; Refill:  3    Essential hypertension  Refill provided  - amLODIPine (NORVASC) 5 MG tablet  Dispense: 90 tablet; Refill: 3  - carvedilol (COREG) 12.5 MG tablet  Dispense: 180 tablet; Refill: 3    Closed fracture of one rib of right side, initial encounter  Refill provided for short-term muscle relaxer daughter also mention they have Flexeril at home I discussed do not combine methocarbamol and Flexeril she voiced understanding.  - methocarbamol (ROBAXIN) 500 MG tablet  Dispense: 30 tablet; Refill: 0    Encounter for immunization  Immunizations provided, discussed need for Tdap, Shingrix and RSV through local pharmacy   - INFLUENZA HIGH DOSE, TRIVALENT, PF (FLUZONE)  - COVID-19 12+ (PFIZER)  - REVIEW OF HEALTH MAINTENANCE PROTOCOL ORDERS    Hyperlipidemia LDL goal <70  Refill provided  - atorvastatin (LIPITOR) 40 MG tablet  Dispense: 90 tablet; Refill: 3    Fibromyalgia  Stable at this time refill provided  - DULoxetine (CYMBALTA) 20 MG capsule  Dispense: 90 capsule; Refill: 3    Seizures (H)  Refill provided  - levETIRAcetam (KEPPRA) 500 MG tablet  Dispense: 180 tablet; Refill: 3    Dermatitis  Refill provided also as an antihistamine for seasonal allergies  - loratadine (CLARITIN) 10 MG tablet  Dispense: 90 tablet; Refill: 3    Encounter for completion of form with patient  Disability parking form completed, copy to be scanned in the chart and a copy and original sent home with daughter        MED REC REQUIRED  Post Medication Reconciliation Status:    Reviewed and updated      53 minutes spent on the date of the encounter doing chart review, history, exam, diagnostics review, documentation, evaluation & management, counseling and coordination of cares as noted.   Re-reestablish care with Dr Kellogg.  Bony Castaneda MD     Subjective   Isabell is a 64 year old, presenting for the following health issues:  Follow Up (4 months follow up; pain tends to be higher in the morning, (around 7/8 per pt daughter), moderate pain  throughout the day can be at 4/5 per pt daughter )        10/7/2024     1:59 PM   Additional Questions   Roomed by MR   Accompanied by Tamie     History of Present Illness       Reason for visit:  Fall  Symptom onset:  1-2 weeks ago  Symptom intensity:  Moderate  Symptom progression:  Improving  Had these symptoms before:  Yes  Has tried/received treatment for these symptoms:  No  What makes it worse:  When i move the area  What makes it better:  Muscle relaxers   She is taking medications regularly.     History of prior strokes (right basal ganglia ischemic infarct in 2018, left basal ganglia hemorrhagic stroke 2021, right centrum semiovale & right chuck infarct 2023)    Baseline facial droop and dysarthria, dysphagia.   High-grade stenosis of MCA M2 segment and A2 segment : Plavix 75 mg added for three months  started 6/16/2024 and continue ASA 81   Hx of seizures Keppra 500 mg twice dialy   Hypertension :Coreg 12.5 mg twice daily and amlodipine 5 mg  Hyperlipidemia Atorvastain 40 mg daily   Diabetes mellitus    Lantus 19 units bedtime, Aspart 3 units with meals plus correction scale see orders .We also prescribed Gvoke (glucagon) for episodes of hypoglycemia. A1C 11.2  Hypothyroid levothyroxine 75 mcg daily  Rib fractures from [facility] 9/23/2024 level 5-8 moderate pain. Will be starting at day center. I reviewed they may need to schedule a follow-up for form completion for day center as they did not have one today.  They requested disability parking form completed today for 6 years through 10/2030.      Labs reviewed in EPIC  BP Readings from Last 3 Encounters:   10/07/24 136/84   09/25/24 (!) 151/77   08/28/24 120/80    Wt Readings from Last 3 Encounters:   10/07/24 58.9 kg (129 lb 14.4 oz)   07/08/24 62.1 kg (137 lb)   03/07/24 61.2 kg (135 lb)                  Patient Active Problem List   Diagnosis    Acquired hypothyroidism    Seizures (H)    Hyperlipidemia LDL goal <100    DKA (diabetic ketoacidoses)     Nephrolithiasis    Left renal mass    Coronary atherosclerosis    Dehydration    Ischemic vascular dementia (H)    Other osteoarthritis of spine, unspecified spinal region    Vitamin D deficiency    Vitamin B12 deficiency (non anemic)    Seizures (H)    Nausea & vomiting    Nail deformity    Mixed stress and urge urinary incontinence    Luetscher's syndrome    Left-sided nontraumatic intracerebral hemorrhage, unspecified cerebral location (H)    Vascular dementia without behavioral disturbance (H)    Fungal dermatitis    Type 1 diabetes mellitus with diabetic polyneuropathy (H)    Chest pressure    Essential hypertension    Dysphagia    Tobacco use    Dysthymia    History of diabetes with ketoacidosis    Stage 3b chronic kidney disease (H)    Full incontinence of feces    Acute CVA (cerebrovascular accident) (H)    Altered mental status, unspecified altered mental status type    Hyperglycemia    Infection due to 2019 novel coronavirus    Hx: UTI (urinary tract infection)    Fibromyalgia    E. 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)    Acute cystitis without hematuria    Cerebrovascular accident (CVA), unspecified mechanism (H)    Severe dementia without behavioral disturbance, psychotic disturbance, mood disturbance, or anxiety, unspecified dementia type (H)    Diabetic ketoacidosis without coma associated with type 1 diabetes mellitus (H)    Cognitive decline    Nausea and vomiting, unspecified vomiting type    Closed fracture of multiple ribs of right side, initial encounter    TIA (transient ischemic attack)    Ischemic stroke (H)    Lethargy    History of CVA (cerebrovascular accident)    H/O insulin dependent diabetes mellitus    Urinary tract infection in female    Chronic kidney disease, unspecified CKD stage    Closed fracture of one rib of left  side, initial encounter     Past Surgical History:   Procedure Laterality Date    athroplasty hip Bilateral     ,     OTHER SURGICAL HISTORY      athroplasty hip    PICC DOUBLE LUMEN PLACEMENT Right 2023    right basilic 5 fr dl power picc 39 cm    STENT         Social History     Tobacco Use    Smoking status: Former     Current packs/day: 0.00     Average packs/day: 0.5 packs/day for 35.0 years (17.5 ttl pk-yrs)     Types: Cigarettes     Start date: 1983     Quit date: 2018     Years since quittin.2    Smokeless tobacco: Never   Substance Use Topics    Alcohol use: Not Currently     Family History   Problem Relation Age of Onset    Acute Myocardial Infarction Father     Heart Disease Maternal Grandmother     Dementia Maternal Grandmother     Myocardial Infarction Father     Dementia Paternal Grandmother     Heart Disease Paternal Grandmother          Current Outpatient Medications   Medication Sig Dispense Refill    acetaminophen (TYLENOL) 325 MG tablet Take 3 tablets (975 mg) by mouth every 8 hours.      amLODIPine (NORVASC) 5 MG tablet Take 1 tablet (5 mg) by mouth daily. 90 tablet 3    aspirin (ASA) 81 MG chewable tablet Take 1 tablet (81 mg) by mouth daily 90 tablet 3    atorvastatin (LIPITOR) 40 MG tablet Take 1 tablet (40 mg) by mouth daily. 90 tablet 3    blood glucose (TRUE METRIX BLOOD GLUCOSE TEST) test strip USE TO TEST BLOOD SUGAR 4 TO 5 TIMES DAILY OR AS DIRECTED 400 strip 3    blood glucose monitoring (NO BRAND SPECIFIED) meter device kit Use to test blood sugar 4 times daily.  Please provide glucose meter that is covered by insurance. 1 kit 0    carvedilol (COREG) 12.5 MG tablet Take 1 tablet (12.5 mg) by mouth 2 times daily (with meals). 180 tablet 3    cyanocobalamin (VITAMIN B-12) 1000 MCG tablet Take 1 tablet (1,000 mcg) by mouth daily 100 tablet 3    DULoxetine (CYMBALTA) 20 MG capsule Take 1 capsule (20 mg) by mouth daily. 90 capsule 3    gabapentin (NEURONTIN) 100 MG  capsule Take 1 capsule (100 mg) by mouth at bedtime. 90 capsule 3    Glucagon (GVOKE HYPOPEN) 1 MG/0.2ML pen Inject the contents of 1 device under the skin into lower abdomen, outer thigh, or outer upper arm as needed for hypoglycemia. If no response after 15 minutes, additional 1 mg dose from a new device may be injected while waiting for emergency assistance. 2 each 1    insulin aspart (NOVOLOG FLEXPEN) 100 UNIT/ML pen Inject 2 units with meals plus correction scale additional three times daily before meals  as follows  Pre-Meal  - 200 give additional 1 unit.  For Pre-Meal  -250 give 2 additional units.  For Pre-Meal -300 give 3 additional units.  For Pre-Meal -350 give 4 additional units.  For Pre-Meal -400 give 5 additional units Max 25 units daily 30 mL 4    insulin aspart (NOVOLOG PENFILL) 100 UNIT/ML cartridge Inject 2-4 Units Subcutaneous 4 times daily (with meals and nightly) diabetes 15 mL 11    insulin glargine (LANTUS PEN) 100 UNIT/ML pen Inject 19 Units subcutaneously at bedtime. 30 mL 1    insulin pen needle (31G X 8 MM) 31G X 8 MM miscellaneous Use 4 pen needles daily or as directed. 300 each 4    levETIRAcetam (KEPPRA) 500 MG tablet Take 1 tablet (500 mg) by mouth 2 times daily. 180 tablet 3    Lidocaine (LIDOCARE) 4 % Patch Place 1 patch over 12 hours onto the skin every 24 hours. To prevent lidocaine toxicity, patient should be patch free for 12 hrs daily. 10 patch 0    loratadine (CLARITIN) 10 MG tablet Take 1 tablet (10 mg) by mouth daily. 90 tablet 3    methocarbamol (ROBAXIN) 500 MG tablet Take 1 tablet (500 mg) by mouth daily as needed for muscle spasms. 30 tablet 0    SYNTHROID 75 MCG tablet Take 1 tablet (75 mcg) by mouth daily. 90 tablet 3    zinc oxide (DESITIN) 20 % external ointment Apply topically as needed for dry skin or irritation 85 g 3     Allergies   Allergen Reactions    Seasonal Allergies      Recent Labs   Lab Test 09/25/24  0704 09/24/24  0710  09/23/24  1325 08/25/24  0850 08/24/24  0601 08/23/24  2212 06/16/24  0846 06/16/24  0824 05/20/24  1538 03/07/24  0109 01/29/24  1655 10/16/23  1750 05/21/23  0627 05/20/23  1312 03/29/23  1158 08/02/21  1009 07/07/21  0630 06/27/21  0759   A1C  --   --  11.2*  --   --   --   --   --  10.2*  --  10.7*  --    < > 10.8* 11.2*   < >  --   --    LDL  --   --   --   --   --   --   --  67  --   --   --   --   --  71 72   < >  --   --    HDL  --   --   --   --   --   --   --  43*  --   --   --   --   --  35* 48*   < >  --   --    TRIG  --   --   --   --   --   --   --  119  --   --   --   --   --  152* 105   < >  --   --    ALT  --   --  13  --  7 9  --   --  17  --   --  24   < > 17 39*   < >  --   --    CR 1.48* 1.63* 1.46*   < > 1.51* 1.87*   < > 1.73* 1.63*   < > 1.64* 1.66*   < > 1.71*  1.71* 1.62*   < > 1.68* 1.48*   GFRESTIMATED 39* 34* 39*   < > 38* 29*   < > 32* 35*   < > 34* 34*   < > 33*  33* 35*   < > 31* 37*   GFRESTBLACK  --   --   --   --   --   --   --   --   --   --   --   --   --   --   --   --  37* 43*   POTASSIUM 4.2 4.5 4.3   < > 3.4 4.5   < > 4.2 5.1   < > 4.7 4.3   < > 4.3 4.1   < > 3.9 3.5   TSH  --   --   --   --   --   --   --   --  0.80  --   --  0.59   < >  --  2.38   < >  --   --     < > = values in this interval not displayed.                 Review of Systems  Problem list, PMH, Surgical HX, SH, allergies, medications,immunizations reviewed and updated in Epic.  ROS noted in HPI and ROS,staff / patient questionnaires reviewed by me.         Objective    /84 (BP Location: Right arm, Patient Position: Sitting, Cuff Size: Adult Regular)   Pulse 82   Temp 97.7  F (36.5  C)   Wt 58.9 kg (129 lb 14.4 oz)   SpO2 96%   BMI 23.01 kg/m    Body mass index is 23.01 kg/m .  Physical Exam Constitutional: Sitting in a wheelchair drinking water, quiet, allows her daughter to do the communication but responds to questioning.  Appears stable at her baseline chronic poor health, well-groomed,  cooperative.  HENT:Right pinna pedunculated growth has been present for years without change. Facial symmetry improved.  Head: Normocephalic and atraumatic. Mouth/Throat: hydrated,  dental decay.  Eyes: Conjunctivae and EOM are normal.No scleral icterus.   Neck:  Neck supple.  No tracheal deviation present. No thyromegaly present.   Cardiovascular: Regular rhythm, normal heart sounds, No murmur present.   Chest wall no ecchymosis, no rash, rib slightly tender  Pulmonary/Chest: Effort normal and breath sounds normal. No respiratory distress.   Musculoskeletal: Deconditioned upper and lower extremities.   No edema and no tenderness.  Neurological: Cooperative.  Signs of vascular dementia. Able to move bilateral upper and lower extremities   Psychiatric: Cooperative, communicative through nods and a few words, Patient completed DMV form in her own handwriting.  Results reviewed from hospital       Signed Electronically by: Bony Castaneda MD

## 2024-10-14 NOTE — TELEPHONE ENCOUNTER
REASON FOR VISIT: Cerebrovascular accident (CVA), unspecified mechanism    DATE OF APPT: 10/15/2024   NOTES (FOR ALL VISITS) STATUS DETAILS   OFFICE NOTE from referring provider Internal [facility]  Binh Diaz MD 6/18/2024   MEDICATION LIST Internal    IMAGING  (FOR ALL VISITS)     XR N/A    MRI (HEAD, NECK, SPINE) Internal [facility]  MR Brain 6/16/2024   CT (HEAD, NECK, SPINE) Internal [facility]  CT Head 8/23/2024

## 2024-10-15 ENCOUNTER — PRE VISIT (OUTPATIENT)
Dept: NEUROLOGY | Facility: CLINIC | Age: 64
End: 2024-10-15

## 2024-10-15 ENCOUNTER — OFFICE VISIT (OUTPATIENT)
Dept: NEUROLOGY | Facility: CLINIC | Age: 64
End: 2024-10-15
Payer: COMMERCIAL

## 2024-10-15 VITALS
WEIGHT: 129 LBS | HEART RATE: 84 BPM | DIASTOLIC BLOOD PRESSURE: 82 MMHG | SYSTOLIC BLOOD PRESSURE: 139 MMHG | BODY MASS INDEX: 22.85 KG/M2

## 2024-10-15 DIAGNOSIS — R06.83 SNORING: ICD-10-CM

## 2024-10-15 DIAGNOSIS — F01.50 VASCULAR DEMENTIA WITHOUT BEHAVIORAL DISTURBANCE (H): ICD-10-CM

## 2024-10-15 DIAGNOSIS — I63.9 CEREBROVASCULAR ACCIDENT (CVA), UNSPECIFIED MECHANISM (H): Primary | ICD-10-CM

## 2024-10-15 DIAGNOSIS — R56.9 SEIZURES (H): ICD-10-CM

## 2024-10-15 PROCEDURE — G2211 COMPLEX E/M VISIT ADD ON: HCPCS | Performed by: INTERNAL MEDICINE

## 2024-10-15 PROCEDURE — 99204 OFFICE O/P NEW MOD 45 MIN: CPT | Performed by: INTERNAL MEDICINE

## 2024-10-15 NOTE — LETTER
10/15/2024      Isabell Matthews  [address]  [place] [address]      Dear Colleague,    Thank you for referring your patient, Isabell Matthews, to the [facility] [place]. Please see a copy of my visit note below.    [facility] Follow Up Visit    Isabell Matthews MRN# [id number]   Age: 64 year old YOB: 1960     Brief history of symptoms: The patient was initially seen in neurologic consultation on 11/6/2020 for evaluation of dementia. Please see the comprehensive neurologic consultation note from that date in the Epic records for details.          Assessment and Plan:   Assessment:  Isabell Matthews is a 64 year old female who presents today for follow-up of vascular dementia, strokes, and seizures. Most recent stroke occurred in 6/2024. Etiology was thought to be secondary to intracranial atherosclerosis. She has completed 3 months of aspirin/Plavix and is know and aspirin monotherapy. She has seeming recovered close to her pre-stroke baseline. Patient has snoring and poor sleep. Sleep referral was placed today.     Brief examination today shows minimal cognitive decline compared to examination 4 years ago. I suspect cognitive symptoms are related to vascular disease and have a lower suspicion for neurodegenerative process at this time.     Patient previously had seizures in the setting of strokes and DKA. These were described as unilateral shaking with loss of consciousness. She hasn't had any clear seizures in the last 4 years. I would recommend continuing Keppra.     Plan:  - Continue Aspirin 81 mg  - LDL goal < 70  - A1c goal < 7  - BP goal < 140/90 and < 130/80 if tolerated  - Sleep referral  - Keppra 500 mg BID    Follow up in Neurology clinic in 1 year or earlier as needed should new concerns arise.    Marshall Medina MD   of Neurology  [facility]  [place]  ---------------------------------------------------------------------------------------------------------------------------         Interval History:   Patient was seen 4 years ago for consultation on cognitive changes and prior strokes. She has had multiple additional strokes since that consultation.     The memory has worsened in the last 4 years, but not significantly. Patient thinks it is a little worse.     Most recent stroke happened in June 2024 where she presented with left facial weakness. She also had some swallowing issues. Swallowing is back to baseline. Her other symptoms are back to baseline seemingly.     She is dealing with a broken rib currently after a fall. Balance is a little worse compared to prior visit. She uses a walker to ambulate.      She hasn't had any clear seizures since last visit. She is not having side effects with Keppra.       Past Medical History:     Patient Active Problem List   Diagnosis    Acquired hypothyroidism    Seizures (H)    Hyperlipidemia LDL goal <100    DKA (diabetic ketoacidoses)    Nephrolithiasis    Left renal mass    Coronary atherosclerosis    Dehydration    Ischemic vascular dementia (H)    Other osteoarthritis of spine, unspecified spinal region    Vitamin D deficiency    Vitamin B12 deficiency (non anemic)    Seizures (H)    Nausea & vomiting    Nail deformity    Mixed stress and urge urinary incontinence    Luetscher's syndrome    Left-sided nontraumatic intracerebral hemorrhage, unspecified cerebral location (H)    Vascular dementia without behavioral disturbance (H)    Fungal dermatitis    Type 1 diabetes mellitus with diabetic polyneuropathy (H)    Chest pressure    Essential hypertension    Dysphagia    Tobacco use    Dysthymia    History of diabetes with ketoacidosis    Stage 3b chronic kidney disease (H)    Full incontinence of feces    Acute CVA (cerebrovascular accident) (H)    Altered mental status, unspecified altered mental status type     Hyperglycemia    Infection due to 2019 novel coronavirus    Hx: UTI (urinary tract infection)    Fibromyalgia    E. 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)    Acute cystitis without hematuria    Cerebrovascular accident (CVA), unspecified mechanism (H)    Severe dementia without behavioral disturbance, psychotic disturbance, mood disturbance, or anxiety, unspecified dementia type (H)    Diabetic ketoacidosis without coma associated with type 1 diabetes mellitus (H)    Cognitive decline    Nausea and vomiting, unspecified vomiting type    Closed fracture of multiple ribs of right side, initial encounter    TIA (transient ischemic attack)    Ischemic stroke (H)    Lethargy    History of CVA (cerebrovascular accident)    H/O insulin dependent diabetes mellitus    Urinary tract infection in female    Chronic kidney disease, unspecified CKD stage    Closed fracture of one rib of left side, initial encounter     Past Medical History:   Diagnosis Date    Chronic pain     Coronary atherosclerosis 2016    Essential hypertension     Ex-cigarette smoker     quit 2018 over 35 years    Ex-cigarette smoker     Hyperlipidemia     Hypothyroid     Seizures (H)     Stroke (H)     Vascular dementia (H)         Past Surgical History:     Past Surgical History:   Procedure Laterality Date    athroplasty hip Bilateral     ,     OTHER SURGICAL HISTORY      athroplasty hip    PICC DOUBLE LUMEN PLACEMENT Right 2023    right basilic 5 fr dl power picc 39 cm    STENT        Social History:     Social History     Tobacco Use    Smoking status: Former     Current packs/day: 0.00     Average packs/day: 0.5 packs/day for 35.0 years (17.5 ttl pk-yrs)     Types: Cigarettes     Start date: 1983     Quit date: 2018     Years since quittin.2    Smokeless  tobacco: Never   Substance Use Topics    Alcohol use: Not Currently    Drug use: Not Currently      Family History:     Family History   Problem Relation Age of Onset    Acute Myocardial Infarction Father     Heart Disease Maternal Grandmother     Dementia Maternal Grandmother     Myocardial Infarction Father     Dementia Paternal Grandmother     Heart Disease Paternal Grandmother       Medications:     Current Outpatient Medications   Medication Sig Dispense Refill    acetaminophen (TYLENOL) 325 MG tablet Take 3 tablets (975 mg) by mouth every 8 hours.      amLODIPine (NORVASC) 5 MG tablet Take 1 tablet (5 mg) by mouth daily. 90 tablet 3    aspirin (ASA) 81 MG chewable tablet Take 1 tablet (81 mg) by mouth daily 90 tablet 3    atorvastatin (LIPITOR) 40 MG tablet Take 1 tablet (40 mg) by mouth daily. 90 tablet 3    blood glucose (TRUE METRIX BLOOD GLUCOSE TEST) test strip USE TO TEST BLOOD SUGAR 4 TO 5 TIMES DAILY OR AS DIRECTED 400 strip 3    blood glucose monitoring (NO BRAND SPECIFIED) meter device kit Use to test blood sugar 4 times daily.  Please provide glucose meter that is covered by insurance. 1 kit 0    carvedilol (COREG) 12.5 MG tablet Take 1 tablet (12.5 mg) by mouth 2 times daily (with meals). 180 tablet 3    cyanocobalamin (VITAMIN B-12) 1000 MCG tablet Take 1 tablet (1,000 mcg) by mouth daily 100 tablet 3    DULoxetine (CYMBALTA) 20 MG capsule Take 1 capsule (20 mg) by mouth daily. 90 capsule 3    gabapentin (NEURONTIN) 100 MG capsule Take 1 capsule (100 mg) by mouth at bedtime. 90 capsule 3    Glucagon (GVOKE HYPOPEN) 1 MG/0.2ML pen Inject the contents of 1 device under the skin into lower abdomen, outer thigh, or outer upper arm as needed for hypoglycemia. If no response after 15 minutes, additional 1 mg dose from a new device may be injected while waiting for emergency assistance. 2 each 1    insulin aspart (NOVOLOG FLEXPEN) 100 UNIT/ML pen Inject 2 units with meals plus correction scale additional  three times daily before meals  as follows  Pre-Meal  - 200 give additional 1 unit.  For Pre-Meal  -250 give 2 additional units.  For Pre-Meal -300 give 3 additional units.  For Pre-Meal -350 give 4 additional units.  For Pre-Meal -400 give 5 additional units Max 25 units daily 30 mL 4    insulin aspart (NOVOLOG PENFILL) 100 UNIT/ML cartridge Inject 2-4 Units Subcutaneous 4 times daily (with meals and nightly) diabetes 15 mL 11    insulin glargine (LANTUS PEN) 100 UNIT/ML pen Inject 19 Units subcutaneously at bedtime. 30 mL 1    insulin pen needle (31G X 8 MM) 31G X 8 MM miscellaneous Use 4 pen needles daily or as directed. 300 each 4    levETIRAcetam (KEPPRA) 500 MG tablet Take 1 tablet (500 mg) by mouth 2 times daily. 180 tablet 3    Lidocaine (LIDOCARE) 4 % Patch Place 1 patch over 12 hours onto the skin every 24 hours. To prevent lidocaine toxicity, patient should be patch free for 12 hrs daily. 10 patch 0    loratadine (CLARITIN) 10 MG tablet Take 1 tablet (10 mg) by mouth daily. 90 tablet 3    methocarbamol (ROBAXIN) 500 MG tablet Take 1 tablet (500 mg) by mouth daily as needed for muscle spasms. 30 tablet 0    SYNTHROID 75 MCG tablet Take 1 tablet (75 mcg) by mouth daily. 90 tablet 3    zinc oxide (DESITIN) 20 % external ointment Apply topically as needed for dry skin or irritation 85 g 3     No current facility-administered medications for this visit.      Allergies:     Allergies   Allergen Reactions    Seasonal Allergies         Review of Systems:   As above     Physical Exam:   Vitals: /82 (BP Location: Right arm, Patient Position: Sitting, Cuff Size: Adult Regular)   Pulse 84   Wt 58.5 kg (129 lb)   BMI 22.85 kg/m     General: Seated comfortably in no acute distress.  Lungs: breathing comfortably  Neurologic:     Mental Status: Fully alert. Paucity of speech. Slightly inattentive. 3/3 on 5 minute recall. Oriented to person and knows she is in a doctor's office. Able  to say it is October 2024, but not the specific date. Knows it is Tuesday. Doesn't know that president, but knows a male and female are running for president. Able to name items correctly and repeat a simple sentence. Doesn't know her exact age.      Cranial Nerves: Visual fields intact. PERRL. EOMI with normal smooth pursuit. Facial sensation intact/symmetric. Facial movements symmetric. Hearing not formally tested but intact to conversation. Palate elevation symmetric, uvula midline. No dysarthria. Shoulder shrug strong bilaterally. Tongue protrusion midline.     Motor: No tremors or other abnormal movements observed. Muscle tone increased throughout (right arm/leg more than left). Possible slight right pronator drift. Bradykinesia with rapid finger tapping. Strength grossly 5/5 throughout upper and lower extremities.     Deep Tendon Reflexes: 3+ upper extremities with exception of 2+ triceps, 2+ patella, 1+ ankle jerks. No clonus. Toes upgoing bilaterally.     Sensory: Intact/symmetric to light touch and proprioception throughout upper and lower extremities. Temperature sensation is decreased in the feet. Vibration is ~4 seconds in bilateral great toes, normal elsewhere.     Coordination: Finger-nose-finger and heel-shin intact without dysmetria.      Gait: Cautious with shuffling components casual gait     Data reviewed on previous visits    Imaging/Laboratory:  CT brain 9/2020  Impression:  1.  No acute intracranial pathology.   2.  Mild to moderate diffuse cerebral atrophy and leukoaraiosis.  Personally reviewed           Procedures:  EEG 7/2018      Pertinent Investigations since last visit:   Stroke Evaluation Summarized  MRI/Head CT 1. Acute infarct in the right insular cortex and right frontal operculum.  2. Multifocal chronic infarcts, greatest in the left external capsule.   Intracranial and Cervical Vasculature 1. Short segment severe stenosis of the right superior division M2 segment with similar  appearance on the comparison CTA examination.  2. Multifocal areas of additional high-grade stenosis including involvement of the proximal A2 segment of the left anterior cerebral artery, the bilateral posterior cerebral arteries, and multiple distal bilateral MCA branches. These areas of stenosis are similar to the  prior CT angiogram.      Echocardiogram Left ventricular size, wall motion and function are normal. The ejection fraction is 60-65%. Right ventricular function, chamber size, wall motion, and thickness are normal. Mild mitral insufficiency is present. The inferior vena cava was normal in size with preserved respiratory variability. Small circumferential pericardial effusion is present without any hemodynamic significance.   EKG/Telemetry Sinus rhythm       LDL  6/16/2024: 67 mg/dL   A1C  5/20/2024: 10.2 %   Troponin 6/16/2024: 32 ng/L           The total time of this encounter today amounted to 48 minutes. This time included time spent with the patient, prep work, ordering tests, and performing post visit documentation.    The longitudinal plan of care for the diagnosis(es)/condition(s) as documented were addressed during this visit. Due to the added complexity in care, I will continue to support Isabell in the subsequent management and with ongoing continuity of care.        Again, thank you for allowing me to participate in the care of your patient.        Sincerely,      Marshall Medina MD

## 2024-10-15 NOTE — PROGRESS NOTES
[facility] Neurology Follow Up Visit    Isabell Matthews MRN# [id number]   Age: 64 year old YOB: 1960     Brief history of symptoms: The patient was initially seen in neurologic consultation on 11/6/2020 for evaluation of dementia. Please see the comprehensive neurologic consultation note from that date in the Epic records for details.          Assessment and Plan:   Assessment:  Isabell Matthews is a 64 year old female who presents today for follow-up of vascular dementia, strokes, and seizures. Most recent stroke occurred in 6/2024. Etiology was thought to be secondary to intracranial atherosclerosis. She has completed 3 months of aspirin/Plavix and is know and aspirin monotherapy. She has seeming recovered close to her pre-stroke baseline. Patient has snoring and poor sleep. Sleep referral was placed today.     Brief examination today shows minimal cognitive decline compared to examination 4 years ago. I suspect cognitive symptoms are related to vascular disease and have a lower suspicion for neurodegenerative process at this time.     Patient previously had seizures in the setting of strokes and DKA. These were described as unilateral shaking with loss of consciousness. She hasn't had any clear seizures in the last 4 years. I would recommend continuing Keppra.     Plan:  - Continue Aspirin 81 mg  - LDL goal < 70  - A1c goal < 7  - BP goal < 140/90 and < 130/80 if tolerated  - Sleep referral  - Keppra 500 mg BID    Follow up in Neurology clinic in 1 year or earlier as needed should new concerns arise.    Marshall Medina MD   of Neurology  [facility]  ---------------------------------------------------------------------------------------------------------------------------           Interval History:   Patient was seen 4 years ago for consultation on cognitive changes and prior strokes. She has had multiple additional strokes since that consultation.     The memory has worsened in the  last 4 years, but not significantly. Patient thinks it is a little worse.     Most recent stroke happened in June 2024 where she presented with left facial weakness. She also had some swallowing issues. Swallowing is back to baseline. Her other symptoms are back to baseline seemingly.     She is dealing with a broken rib currently after a fall. Balance is a little worse compared to prior visit. She uses a walker to ambulate.      She hasn't had any clear seizures since last visit. She is not having side effects with Keppra.       Past Medical History:     Patient Active Problem List   Diagnosis    Acquired hypothyroidism    Seizures (H)    Hyperlipidemia LDL goal <100    DKA (diabetic ketoacidoses)    Nephrolithiasis    Left renal mass    Coronary atherosclerosis    Dehydration    Ischemic vascular dementia (H)    Other osteoarthritis of spine, unspecified spinal region    Vitamin D deficiency    Vitamin B12 deficiency (non anemic)    Seizures (H)    Nausea & vomiting    Nail deformity    Mixed stress and urge urinary incontinence    Luetscher's syndrome    Left-sided nontraumatic intracerebral hemorrhage, unspecified cerebral location (H)    Vascular dementia without behavioral disturbance (H)    Fungal dermatitis    Type 1 diabetes mellitus with diabetic polyneuropathy (H)    Chest pressure    Essential hypertension    Dysphagia    Tobacco use    Dysthymia    History of diabetes with ketoacidosis    Stage 3b chronic kidney disease (H)    Full incontinence of feces    Acute CVA (cerebrovascular accident) (H)    Altered mental status, unspecified altered mental status type    Hyperglycemia    Infection due to 2019 novel coronavirus    Hx: UTI (urinary tract infection)    Fibromyalgia    E. 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)    Acute cystitis without  hematuria    Cerebrovascular accident (CVA), unspecified mechanism (H)    Severe dementia without behavioral disturbance, psychotic disturbance, mood disturbance, or anxiety, unspecified dementia type (H)    Diabetic ketoacidosis without coma associated with type 1 diabetes mellitus (H)    Cognitive decline    Nausea and vomiting, unspecified vomiting type    Closed fracture of multiple ribs of right side, initial encounter    TIA (transient ischemic attack)    Ischemic stroke (H)    Lethargy    History of CVA (cerebrovascular accident)    H/O insulin dependent diabetes mellitus    Urinary tract infection in female    Chronic kidney disease, unspecified CKD stage    Closed fracture of one rib of left side, initial encounter     Past Medical History:   Diagnosis Date    Chronic pain     Coronary atherosclerosis 2016    Essential hypertension     Ex-cigarette smoker     quit 2018 over 35 years    Ex-cigarette smoker     Hyperlipidemia     Hypothyroid     Seizures (H)     Stroke (H)     Vascular dementia (H)         Past Surgical History:     Past Surgical History:   Procedure Laterality Date    athroplasty hip Bilateral     ,     OTHER SURGICAL HISTORY      athroplasty hip    PICC DOUBLE LUMEN PLACEMENT Right 2023    right basilic 5 fr dl power picc 39 cm    STENT          Social History:     Social History     Tobacco Use    Smoking status: Former     Current packs/day: 0.00     Average packs/day: 0.5 packs/day for 35.0 years (17.5 ttl pk-yrs)     Types: Cigarettes     Start date: 1983     Quit date: 2018     Years since quittin.2    Smokeless tobacco: Never   Substance Use Topics    Alcohol use: Not Currently    Drug use: Not Currently        Family History:     Family History   Problem Relation Age of Onset    Acute Myocardial Infarction Father     Heart Disease Maternal Grandmother     Dementia Maternal Grandmother     Myocardial Infarction Father     Dementia Paternal Grandmother      Heart Disease Paternal Grandmother         Medications:     Current Outpatient Medications   Medication Sig Dispense Refill    acetaminophen (TYLENOL) 325 MG tablet Take 3 tablets (975 mg) by mouth every 8 hours.      amLODIPine (NORVASC) 5 MG tablet Take 1 tablet (5 mg) by mouth daily. 90 tablet 3    aspirin (ASA) 81 MG chewable tablet Take 1 tablet (81 mg) by mouth daily 90 tablet 3    atorvastatin (LIPITOR) 40 MG tablet Take 1 tablet (40 mg) by mouth daily. 90 tablet 3    blood glucose (TRUE METRIX BLOOD GLUCOSE TEST) test strip USE TO TEST BLOOD SUGAR 4 TO 5 TIMES DAILY OR AS DIRECTED 400 strip 3    blood glucose monitoring (NO BRAND SPECIFIED) meter device kit Use to test blood sugar 4 times daily.  Please provide glucose meter that is covered by insurance. 1 kit 0    carvedilol (COREG) 12.5 MG tablet Take 1 tablet (12.5 mg) by mouth 2 times daily (with meals). 180 tablet 3    cyanocobalamin (VITAMIN B-12) 1000 MCG tablet Take 1 tablet (1,000 mcg) by mouth daily 100 tablet 3    DULoxetine (CYMBALTA) 20 MG capsule Take 1 capsule (20 mg) by mouth daily. 90 capsule 3    gabapentin (NEURONTIN) 100 MG capsule Take 1 capsule (100 mg) by mouth at bedtime. 90 capsule 3    Glucagon (GVOKE HYPOPEN) 1 MG/0.2ML pen Inject the contents of 1 device under the skin into lower abdomen, outer thigh, or outer upper arm as needed for hypoglycemia. If no response after 15 minutes, additional 1 mg dose from a new device may be injected while waiting for emergency assistance. 2 each 1    insulin aspart (NOVOLOG FLEXPEN) 100 UNIT/ML pen Inject 2 units with meals plus correction scale additional three times daily before meals  as follows  Pre-Meal  - 200 give additional 1 unit.  For Pre-Meal  -250 give 2 additional units.  For Pre-Meal -300 give 3 additional units.  For Pre-Meal -350 give 4 additional units.  For Pre-Meal -400 give 5 additional units Max 25 units daily 30 mL 4    insulin aspart (NOVOLOG  PENFILL) 100 UNIT/ML cartridge Inject 2-4 Units Subcutaneous 4 times daily (with meals and nightly) diabetes 15 mL 11    insulin glargine (LANTUS PEN) 100 UNIT/ML pen Inject 19 Units subcutaneously at bedtime. 30 mL 1    insulin pen needle (31G X 8 MM) 31G X 8 MM miscellaneous Use 4 pen needles daily or as directed. 300 each 4    levETIRAcetam (KEPPRA) 500 MG tablet Take 1 tablet (500 mg) by mouth 2 times daily. 180 tablet 3    Lidocaine (LIDOCARE) 4 % Patch Place 1 patch over 12 hours onto the skin every 24 hours. To prevent lidocaine toxicity, patient should be patch free for 12 hrs daily. 10 patch 0    loratadine (CLARITIN) 10 MG tablet Take 1 tablet (10 mg) by mouth daily. 90 tablet 3    methocarbamol (ROBAXIN) 500 MG tablet Take 1 tablet (500 mg) by mouth daily as needed for muscle spasms. 30 tablet 0    SYNTHROID 75 MCG tablet Take 1 tablet (75 mcg) by mouth daily. 90 tablet 3    zinc oxide (DESITIN) 20 % external ointment Apply topically as needed for dry skin or irritation 85 g 3     No current facility-administered medications for this visit.        Allergies:     Allergies   Allergen Reactions    Seasonal Allergies         Review of Systems:   As above     Physical Exam:   Vitals: /82 (BP Location: Right arm, Patient Position: Sitting, Cuff Size: Adult Regular)   Pulse 84   Wt 58.5 kg (129 lb)   BMI 22.85 kg/m     General: Seated comfortably in no acute distress.  Lungs: breathing comfortably  Neurologic:     Mental Status: Fully alert. Paucity of speech. Slightly inattentive. 3/3 on 5 minute recall. Oriented to person and knows she is in a doctor's office. Able to say it is October 2024, but not the specific date. Knows it is Tuesday. Doesn't know that president, but knows a male and female are running for president. Able to name items correctly and repeat a simple sentence. Doesn't know her exact age.      Cranial Nerves: Visual fields intact. PERRL. EOMI with normal smooth pursuit. Facial  sensation intact/symmetric. Facial movements symmetric. Hearing not formally tested but intact to conversation. Palate elevation symmetric, uvula midline. No dysarthria. Shoulder shrug strong bilaterally. Tongue protrusion midline.     Motor: No tremors or other abnormal movements observed. Muscle tone increased throughout (right arm/leg more than left). Possible slight right pronator drift. Bradykinesia with rapid finger tapping. Strength grossly 5/5 throughout upper and lower extremities.     Deep Tendon Reflexes: 3+ upper extremities with exception of 2+ triceps, 2+ patella, 1+ ankle jerks. No clonus. Toes upgoing bilaterally.     Sensory: Intact/symmetric to light touch and proprioception throughout upper and lower extremities. Temperature sensation is decreased in the feet. Vibration is ~4 seconds in bilateral great toes, normal elsewhere.     Coordination: Finger-nose-finger and heel-shin intact without dysmetria.      Gait: Cautious with shuffling components casual gait     Data reviewed on previous visits    Imaging/Laboratory:  CT brain 9/2020  Impression:  1.  No acute intracranial pathology.   2.  Mild to moderate diffuse cerebral atrophy and leukoaraiosis.  Personally reviewed           Procedures:  EEG 7/2018      Pertinent Investigations since last visit:   Stroke Evaluation Summarized  MRI/Head CT 1. Acute infarct in the right insular cortex and right frontal operculum.  2. Multifocal chronic infarcts, greatest in the left external capsule.   Intracranial and Cervical Vasculature 1. Short segment severe stenosis of the right superior division M2 segment with similar appearance on the comparison CTA examination.  2. Multifocal areas of additional high-grade stenosis including involvement of the proximal A2 segment of the left anterior cerebral artery, the bilateral posterior cerebral arteries, and multiple distal bilateral MCA branches. These areas of stenosis are similar to the  prior CT angiogram.       Echocardiogram Left ventricular size, wall motion and function are normal. The ejection fraction is 60-65%. Right ventricular function, chamber size, wall motion, and thickness are normal. Mild mitral insufficiency is present. The inferior vena cava was normal in size with preserved respiratory variability. Small circumferential pericardial effusion is present without any hemodynamic significance.   EKG/Telemetry Sinus rhythm       LDL  6/16/2024: 67 mg/dL   A1C  5/20/2024: 10.2 %   Troponin 6/16/2024: 32 ng/L           The total time of this encounter today amounted to 48 minutes. This time included time spent with the patient, prep work, ordering tests, and performing post visit documentation.    The longitudinal plan of care for the diagnosis(es)/condition(s) as documented were addressed during this visit. Due to the added complexity in care, I will continue to support Isabell in the subsequent management and with ongoing continuity of care.

## 2024-11-01 ENCOUNTER — TRANSFERRED RECORDS (OUTPATIENT)
Dept: HEALTH INFORMATION MANAGEMENT | Facility: CLINIC | Age: 64
End: 2024-11-01
Payer: COMMERCIAL

## 2024-11-04 ENCOUNTER — MYC MEDICAL ADVICE (OUTPATIENT)
Dept: INTERNAL MEDICINE | Facility: CLINIC | Age: 64
End: 2024-11-04
Payer: COMMERCIAL

## 2024-11-04 NOTE — TELEPHONE ENCOUNTER
Left Voicemail (1st Attempt) and Sent Mychart (1st Attempt) for the patient to call back and schedule the following:     Appointment type: UMP Return   Provider: DR. CHACON  Return date: 11/6 BRIAN 11/20 BRIAN- OR next available ump return if pt cannot schedule for the 6th, or 20th of November.  Specialty phone number: [phone]  Additional appointment(s) needed: -  Additonal Notes: Pt's provider had to cancel clinic on 11/14/24 - please assist pt in scheduling in BRIAN'S Ok'd for reschedules by provider on 11/6, and 11/20

## 2024-11-07 ENCOUNTER — TELEPHONE (OUTPATIENT)
Dept: FAMILY MEDICINE | Facility: CLINIC | Age: 64
End: 2024-11-07
Payer: COMMERCIAL

## 2024-11-07 NOTE — TELEPHONE ENCOUNTER
Left Voicemail (2nd Attempt) for the patient to call back and schedule the following:    Appointment type: Re-Est NEW  Provider: Dr. Kellogg   Return date: 11/14/24  Specialty phone number: [phone]  Additional appointment(s) needed: -  Additonal Notes: Dr. Kellogg unavailable Nov 14th - reschedule requested. BRIAN's have been added in to reschedule -

## 2024-11-21 ENCOUNTER — OFFICE VISIT (OUTPATIENT)
Dept: FAMILY MEDICINE | Facility: CLINIC | Age: 64
End: 2024-11-21
Payer: COMMERCIAL

## 2024-11-21 VITALS
BODY MASS INDEX: 22.86 KG/M2 | OXYGEN SATURATION: 98 % | DIASTOLIC BLOOD PRESSURE: 92 MMHG | SYSTOLIC BLOOD PRESSURE: 148 MMHG | HEART RATE: 88 BPM | HEIGHT: 63 IN | RESPIRATION RATE: 16 BRPM

## 2024-11-21 DIAGNOSIS — I10 ESSENTIAL HYPERTENSION: ICD-10-CM

## 2024-11-21 DIAGNOSIS — E10.59 TYPE 1 DIABETES MELLITUS WITH OTHER CIRCULATORY COMPLICATION (H): ICD-10-CM

## 2024-11-21 DIAGNOSIS — N18.9 CHRONIC KIDNEY DISEASE, UNSPECIFIED CKD STAGE: Primary | ICD-10-CM

## 2024-11-21 DIAGNOSIS — S22.31XA CLOSED FRACTURE OF ONE RIB OF RIGHT SIDE, INITIAL ENCOUNTER: ICD-10-CM

## 2024-11-21 RX ORDER — METHOCARBAMOL 500 MG/1
500 TABLET, FILM COATED ORAL DAILY PRN
Qty: 30 TABLET | Refills: 0 | Status: SHIPPED | OUTPATIENT
Start: 2024-11-21

## 2024-11-21 RX ORDER — CALCIUM CITRATE/VITAMIN D3 200MG-6.25
TABLET ORAL
Qty: 400 STRIP | Refills: 3 | Status: CANCELLED | OUTPATIENT
Start: 2024-11-21

## 2024-11-21 RX ORDER — AMLODIPINE BESYLATE 5 MG/1
5 TABLET ORAL DAILY
Qty: 90 TABLET | Refills: 3 | Status: SHIPPED | OUTPATIENT
Start: 2024-11-21

## 2024-11-21 ASSESSMENT — PATIENT HEALTH QUESTIONNAIRE - PHQ9
10. IF YOU CHECKED OFF ANY PROBLEMS, HOW DIFFICULT HAVE THESE PROBLEMS MADE IT FOR YOU TO DO YOUR WORK, TAKE CARE OF THINGS AT HOME, OR GET ALONG WITH OTHER PEOPLE: SOMEWHAT DIFFICULT
SUM OF ALL RESPONSES TO PHQ QUESTIONS 1-9: 5
SUM OF ALL RESPONSES TO PHQ QUESTIONS 1-9: 5

## 2024-11-21 NOTE — PROGRESS NOTES
Assessment & Plan     Chronic kidney disease, unspecified CKD stage    - Adult Nephrology  Referral; Future  - UA Macroscopic with reflex to Microscopic and Culture - Lab Collect; Future    Type 1 diabetes mellitus with other circulatory complication (H)  See eye also they know. [facility] those shots [facility]  - Med Therapy Management Referral  - blood glucose (NO BRAND SPECIFIED) test strip; Use to test blood sugar 8 times daily or as directed.    Essential hypertension    - amLODIPine (NORVASC) 5 MG tablet; Take 1 tablet (5 mg) by mouth daily.    Closed fracture of one rib of right side, initial encounter    - methocarbamol (ROBAXIN) 500 MG tablet; Take 1 tablet (500 mg) by mouth daily as needed for muscle spasms.    Just saw Neuro post cva, will see sleep med too    The longitudinal plan of care for the diagnosis(es)/condition(s) as documented were addressed during this visit. Due to the added complexity in care, I will continue to support Isabell in the subsequent management and with ongoing continuity of care.  44 minutes spent by me on the date of the encounter doing chart review, history and exam, documentation and further activities per the note      Will discuss hcm next visit: mammogram dexa colon      No follow-ups on file.    Subjective   Isabell is a 64 year old, presenting for the following health issues:  Establish Care and Recheck Medication (Pt unsure if  she should still be  taking amlodipine, has not been taking recently)      11/21/2024     5:31 PM   Additional Questions   Roomed by sim taveras     History of Present Illness       Reason for visit:  Established care   She is taking medications regularly.   Ran out of nvasc, bp up, tolerated it  Not on ace/arb, has DM 1, ckd too  Will ask for Endo to see (they have referral and plan to make appt), and Renal   Will ask for eye to see (they have referral and plan to make appt)  Discussed role MTM, consider CGM there, they take referral  Labs  utd  Shots: needs rsv, boostrix, shingrix, I write these down and ask them to do at [facility]  Out of test strips  No acute c/o  Lives w/ dtr who is here  They have a SW  Uses one robaxin at bedtime relieves msk pain sleeps well tolerates  Recurrent UTI  Past Medical History:   Diagnosis Date    Chronic pain     Coronary atherosclerosis 6/14/2016    Essential hypertension     Ex-cigarette smoker     quit 7/2018 over 35 years    Ex-cigarette smoker     Hyperlipidemia     Hypothyroid     Seizures (H)     Stroke (H)     Vascular dementia (H)      Past Surgical History:   Procedure Laterality Date    athroplasty hip Bilateral     2003, 2006    OTHER SURGICAL HISTORY      athroplasty hip    PICC DOUBLE LUMEN PLACEMENT Right 06/11/2023    right basilic 5 fr dl power picc 39 cm    STENT       Current Outpatient Medications   Medication Sig Dispense Refill    acetaminophen (TYLENOL) 325 MG tablet Take 3 tablets (975 mg) by mouth every 8 hours.      amLODIPine (NORVASC) 5 MG tablet Take 1 tablet (5 mg) by mouth daily. 90 tablet 3    aspirin (ASA) 81 MG chewable tablet Take 1 tablet (81 mg) by mouth daily 90 tablet 3    atorvastatin (LIPITOR) 40 MG tablet Take 1 tablet (40 mg) by mouth daily. 90 tablet 3    blood glucose (NO BRAND SPECIFIED) test strip Use to test blood sugar 8 times daily or as directed. 700 strip 3    blood glucose (TRUE METRIX BLOOD GLUCOSE TEST) test strip USE TO TEST BLOOD SUGAR 4 TO 5 TIMES DAILY OR AS DIRECTED 400 strip 3    blood glucose monitoring (NO BRAND SPECIFIED) meter device kit Use to test blood sugar 4 times daily.  Please provide glucose meter that is covered by insurance. 1 kit 0    carvedilol (COREG) 12.5 MG tablet Take 1 tablet (12.5 mg) by mouth 2 times daily (with meals). 180 tablet 3    cyanocobalamin (VITAMIN B-12) 1000 MCG tablet Take 1 tablet (1,000 mcg) by mouth daily 100 tablet 3    DULoxetine (CYMBALTA) 20 MG capsule Take 1 capsule (20 mg) by mouth daily. 90 capsule 3     gabapentin (NEURONTIN) 100 MG capsule Take 1 capsule (100 mg) by mouth at bedtime. 90 capsule 3    Glucagon (GVOKE HYPOPEN) 1 MG/0.2ML pen Inject the contents of 1 device under the skin into lower abdomen, outer thigh, or outer upper arm as needed for hypoglycemia. If no response after 15 minutes, additional 1 mg dose from a new device may be injected while waiting for emergency assistance. 2 each 1    insulin aspart (NOVOLOG FLEXPEN) 100 UNIT/ML pen Inject 2 units with meals plus correction scale additional three times daily before meals  as follows  Pre-Meal  - 200 give additional 1 unit.  For Pre-Meal  -250 give 2 additional units.  For Pre-Meal -300 give 3 additional units.  For Pre-Meal -350 give 4 additional units.  For Pre-Meal -400 give 5 additional units Max 25 units daily 30 mL 4    insulin aspart (NOVOLOG PENFILL) 100 UNIT/ML cartridge Inject 2-4 Units Subcutaneous 4 times daily (with meals and nightly) diabetes 15 mL 11    insulin glargine (LANTUS PEN) 100 UNIT/ML pen Inject 19 Units subcutaneously at bedtime. 30 mL 1    insulin pen needle (31G X 8 MM) 31G X 8 MM miscellaneous Use 4 pen needles daily or as directed. 300 each 4    levETIRAcetam (KEPPRA) 500 MG tablet Take 1 tablet (500 mg) by mouth 2 times daily. 180 tablet 3    Lidocaine (LIDOCARE) 4 % Patch Place 1 patch over 12 hours onto the skin every 24 hours. To prevent lidocaine toxicity, patient should be patch free for 12 hrs daily. 10 patch 0    loratadine (CLARITIN) 10 MG tablet Take 1 tablet (10 mg) by mouth daily. 90 tablet 3    methocarbamol (ROBAXIN) 500 MG tablet Take 1 tablet (500 mg) by mouth daily as needed for muscle spasms. 30 tablet 0    SYNTHROID 75 MCG tablet Take 1 tablet (75 mcg) by mouth daily. 90 tablet 3    zinc oxide (DESITIN) 20 % external ointment Apply topically as needed for dry skin or irritation 85 g 3     No current facility-administered medications for this visit.     Allergies    Allergen Reactions    Seasonal Allergies      Family History   Problem Relation Age of Onset    Acute Myocardial Infarction Father     Heart Disease Maternal Grandmother     Dementia Maternal Grandmother     Myocardial Infarction Father     Dementia Paternal Grandmother     Heart Disease Paternal Grandmother      Social History     Socioeconomic History    Marital status:      Spouse name: Not on file    Number of children: Not on file    Years of education: Not on file    Highest education level: Not on file   Occupational History    Not on file   Tobacco Use    Smoking status: Former     Current packs/day: 0.00     Average packs/day: 0.5 packs/day for 35.0 years (17.5 ttl pk-yrs)     Types: Cigarettes     Start date: 1983     Quit date: 2018     Years since quittin.3    Smokeless tobacco: Never   Substance and Sexual Activity    Alcohol use: Not Currently    Drug use: Not Currently    Sexual activity: Not Currently   Other Topics Concern    Parent/sibling w/ CABG, MI or angioplasty before 65F 55M? Not Asked   Social History Narrative    ** Merged History Encounter **         Recently moved to [facility] with Daughter Charli who is her pca     Social Drivers of Health     Financial Resource Strain: Unknown (2024)    Financial Resource Strain     Within the past 12 months, have you or your family members you live with been unable to get utilities (heat, electricity) when it was really needed?: Patient unable to answer   Food Insecurity: Low Risk  (2024)    Food Insecurity     Within the past 12 months, did you worry that your food would run out before you got money to buy more?: No     Within the past 12 months, did the food you bought just not last and you didn t have money to get more?: No   Transportation Needs: Unknown (2024)    Transportation Needs     Within the past 12 months, has lack of transportation kept you from medical appointments, getting your medicines, non-medical  "meetings or appointments, work, or from getting things that you need?: Patient unable to answer   Physical Activity: Not on file   Stress: Not on file   Social Connections: Not on file   Interpersonal Safety: Low Risk  (8/25/2024)    Interpersonal Safety     Do you feel physically and emotionally safe where you currently live?: Yes     Within the past 12 months, have you been hit, slapped, kicked or otherwise physically hurt by someone?: No     Within the past 12 months, have you been humiliated or emotionally abused in other ways by your partner or ex-partner?: No   Housing Stability: Unknown (8/25/2024)    Housing Stability     Do you have housing? : Patient unable to answer     Are you worried about losing your housing?: Patient unable to answer         Objective    BP (!) 148/92 (BP Location: Right arm, Patient Position: Sitting, Cuff Size: Adult Regular)   Pulse 88   Resp 16   Ht 1.6 m (5' 2.99\")   SpO2 98%   BMI 22.86 kg/m    Body mass index is 22.86 kg/m .  Physical Exam   GENERAL: alert and no distress, in wheelchair  History from dtr  MS: no gross musculoskeletal defects noted, no edema            Signed Electronically by: Ronnie Kellogg MD    "

## 2024-11-21 NOTE — Clinical Note
Please contact pt to help with scheduling Endo referral - can be seen elsewhere in [facility] system (ie, Jared, Shyanne Cantu, Sav[facility]) if quicker to get in

## 2024-11-22 ENCOUNTER — TELEPHONE (OUTPATIENT)
Dept: FAMILY MEDICINE | Facility: CLINIC | Age: 64
End: 2024-11-22
Payer: COMMERCIAL

## 2024-12-01 ENCOUNTER — HEALTH MAINTENANCE LETTER (OUTPATIENT)
Age: 64
End: 2024-12-01

## 2024-12-27 DIAGNOSIS — E10.42 TYPE 1 DIABETES MELLITUS WITH DIABETIC POLYNEUROPATHY (H): Primary | ICD-10-CM

## 2024-12-31 RX ORDER — BLOOD-GLUCOSE METER
KIT MISCELLANEOUS
Qty: 1 KIT | Refills: 0 | Status: SHIPPED | OUTPATIENT
Start: 2024-12-31

## 2024-12-31 NOTE — TELEPHONE ENCOUNTER
blood glucose monitoring (NO BRAND SPECIFIED) meter device kit 1 kit 0 4/14/2023     blood glucose (NO BRAND SPECIFIED) test strip 700 strip 3 11/21/2024         Last Office Visit: 11/21/24  Future Office visit:  1/2/25     Routing refill request to provider for review/approval because:  Glucose meter supplies - Ordered per clinic.    Katie Pena RN  P Central Nursing/Red Flag Triage & Med Refill Team

## 2025-01-07 ENCOUNTER — TELEPHONE (OUTPATIENT)
Dept: FAMILY MEDICINE | Facility: CLINIC | Age: 65
End: 2025-01-07
Payer: COMMERCIAL

## 2025-01-07 NOTE — TELEPHONE ENCOUNTER
MTM referral from: [facility] visit (referral by provider)    MTM referral outreach attempt #2 on January 7, 2025 at 11:15 AM      Outcome: Patient not reachable after several attempts, sent [employer] message    Use [facility] part D map  for the carrier/Plan on the flowsheet      MyChart Message Sent    RAYA Zavala   [phone]

## 2025-01-12 ENCOUNTER — HEALTH MAINTENANCE LETTER (OUTPATIENT)
Age: 65
End: 2025-01-12

## 2025-01-15 ENCOUNTER — HOSPITAL ENCOUNTER (INPATIENT)
Facility: CLINIC | Age: 65
LOS: 5 days | Discharge: HOME HEALTH | DRG: 178 | End: 2025-01-21
Attending: EMERGENCY MEDICINE | Admitting: STUDENT IN AN ORGANIZED HEALTH CARE EDUCATION/TRAINING PROGRAM
Payer: COMMERCIAL

## 2025-01-15 DIAGNOSIS — Z86.73 HISTORY OF STROKE WITHOUT RESIDUAL DEFICITS: ICD-10-CM

## 2025-01-15 DIAGNOSIS — R29.6 REPEATED FALLS: Primary | ICD-10-CM

## 2025-01-15 DIAGNOSIS — U07.1 COVID-19: ICD-10-CM

## 2025-01-15 DIAGNOSIS — E10.59 TYPE 1 DIABETES MELLITUS WITH OTHER CIRCULATORY COMPLICATION (H): ICD-10-CM

## 2025-01-15 PROCEDURE — 99285 EMERGENCY DEPT VISIT HI MDM: CPT | Performed by: EMERGENCY MEDICINE

## 2025-01-15 PROCEDURE — 99285 EMERGENCY DEPT VISIT HI MDM: CPT | Mod: 25 | Performed by: EMERGENCY MEDICINE

## 2025-01-16 ENCOUNTER — APPOINTMENT (OUTPATIENT)
Dept: CT IMAGING | Facility: CLINIC | Age: 65
DRG: 178 | End: 2025-01-16
Attending: EMERGENCY MEDICINE
Payer: COMMERCIAL

## 2025-01-16 ENCOUNTER — APPOINTMENT (OUTPATIENT)
Dept: GENERAL RADIOLOGY | Facility: CLINIC | Age: 65
DRG: 178 | End: 2025-01-16
Attending: EMERGENCY MEDICINE
Payer: COMMERCIAL

## 2025-01-16 ENCOUNTER — APPOINTMENT (OUTPATIENT)
Dept: CT IMAGING | Facility: CLINIC | Age: 65
DRG: 178 | End: 2025-01-16
Payer: COMMERCIAL

## 2025-01-16 ENCOUNTER — APPOINTMENT (OUTPATIENT)
Dept: PHYSICAL THERAPY | Facility: CLINIC | Age: 65
DRG: 178 | End: 2025-01-16
Payer: COMMERCIAL

## 2025-01-16 PROBLEM — U07.1 COVID-19: Status: ACTIVE | Noted: 2025-01-16

## 2025-01-16 LAB
ALBUMIN SERPL BCG-MCNC: 3.6 G/DL (ref 3.5–5.2)
ALBUMIN UR-MCNC: 30 MG/DL
ALP SERPL-CCNC: 131 U/L (ref 40–150)
ALT SERPL W P-5'-P-CCNC: 18 U/L (ref 0–50)
ANION GAP SERPL CALCULATED.3IONS-SCNC: 10 MMOL/L (ref 7–15)
APPEARANCE UR: ABNORMAL
AST SERPL W P-5'-P-CCNC: 24 U/L (ref 0–45)
ATRIAL RATE - MUSE: 90 BPM
BASOPHILS # BLD AUTO: 0 10E3/UL (ref 0–0.2)
BASOPHILS NFR BLD AUTO: 1 %
BILIRUB SERPL-MCNC: 0.2 MG/DL
BILIRUB UR QL STRIP: NEGATIVE
BUN SERPL-MCNC: 37.5 MG/DL (ref 8–23)
CALCIUM SERPL-MCNC: 9.1 MG/DL (ref 8.8–10.4)
CHLORIDE SERPL-SCNC: 107 MMOL/L (ref 98–107)
COLOR UR AUTO: ABNORMAL
CREAT SERPL-MCNC: 1.4 MG/DL (ref 0.51–0.95)
CREAT SERPL-MCNC: 1.61 MG/DL (ref 0.51–0.95)
DIASTOLIC BLOOD PRESSURE - MUSE: NORMAL MMHG
EGFRCR SERPLBLD CKD-EPI 2021: 35 ML/MIN/1.73M2
EGFRCR SERPLBLD CKD-EPI 2021: 41 ML/MIN/1.73M2
EOSINOPHIL # BLD AUTO: 0.1 10E3/UL (ref 0–0.7)
EOSINOPHIL NFR BLD AUTO: 2 %
ERYTHROCYTE [DISTWIDTH] IN BLOOD BY AUTOMATED COUNT: 15.3 % (ref 10–15)
EST. AVERAGE GLUCOSE BLD GHB EST-MCNC: 229 MG/DL
FLUAV RNA SPEC QL NAA+PROBE: NEGATIVE
FLUBV RNA RESP QL NAA+PROBE: NEGATIVE
GLUCOSE BLDC GLUCOMTR-MCNC: 151 MG/DL (ref 70–99)
GLUCOSE BLDC GLUCOMTR-MCNC: 270 MG/DL (ref 70–99)
GLUCOSE BLDC GLUCOMTR-MCNC: 372 MG/DL (ref 70–99)
GLUCOSE SERPL-MCNC: 184 MG/DL (ref 70–99)
GLUCOSE UR STRIP-MCNC: >=1000 MG/DL
HBA1C MFR BLD: 9.6 %
HCO3 SERPL-SCNC: 25 MMOL/L (ref 22–29)
HCT VFR BLD AUTO: 35.7 % (ref 35–47)
HGB BLD-MCNC: 11.4 G/DL (ref 11.7–15.7)
HGB UR QL STRIP: NEGATIVE
HOLD SPECIMEN: NORMAL
IMM GRANULOCYTES # BLD: 0 10E3/UL
IMM GRANULOCYTES NFR BLD: 0 %
INR PPP: 0.96 (ref 0.85–1.15)
INTERPRETATION ECG - MUSE: NORMAL
KETONES UR STRIP-MCNC: NEGATIVE MG/DL
LACTATE SERPL-SCNC: 1.3 MMOL/L (ref 0.7–2)
LEUKOCYTE ESTERASE UR QL STRIP: NEGATIVE
LEVETIRACETAM SERPL-MCNC: 28 ÂΜG/ML (ref 10–40)
LYMPHOCYTES # BLD AUTO: 1.6 10E3/UL (ref 0.8–5.3)
LYMPHOCYTES NFR BLD AUTO: 36 %
MCH RBC QN AUTO: 28.1 PG (ref 26.5–33)
MCHC RBC AUTO-ENTMCNC: 31.9 G/DL (ref 31.5–36.5)
MCV RBC AUTO: 88 FL (ref 78–100)
MONOCYTES # BLD AUTO: 0.3 10E3/UL (ref 0–1.3)
MONOCYTES NFR BLD AUTO: 7 %
MUCOUS THREADS #/AREA URNS LPF: PRESENT /LPF
NEUTROPHILS # BLD AUTO: 2.4 10E3/UL (ref 1.6–8.3)
NEUTROPHILS NFR BLD AUTO: 54 %
NITRATE UR QL: NEGATIVE
NRBC # BLD AUTO: 0 10E3/UL
NRBC BLD AUTO-RTO: 0 /100
P AXIS - MUSE: 46 DEGREES
PH UR STRIP: 5.5 [PH] (ref 5–7)
PLATELET # BLD AUTO: 183 10E3/UL (ref 150–450)
POTASSIUM SERPL-SCNC: 4 MMOL/L (ref 3.4–5.3)
PR INTERVAL - MUSE: 150 MS
PROT SERPL-MCNC: 6.2 G/DL (ref 6.4–8.3)
QRS DURATION - MUSE: 92 MS
QT - MUSE: 382 MS
QTC - MUSE: 467 MS
R AXIS - MUSE: 46 DEGREES
RBC # BLD AUTO: 4.06 10E6/UL (ref 3.8–5.2)
RBC URINE: 1 /HPF
RSV RNA SPEC NAA+PROBE: NEGATIVE
SARS-COV-2 RNA RESP QL NAA+PROBE: POSITIVE
SODIUM SERPL-SCNC: 142 MMOL/L (ref 135–145)
SP GR UR STRIP: 1.02 (ref 1–1.03)
SQUAMOUS EPITHELIAL: <1 /HPF
SYSTOLIC BLOOD PRESSURE - MUSE: NORMAL MMHG
T AXIS - MUSE: 18 DEGREES
TROPONIN T SERPL HS-MCNC: 34 NG/L
TROPONIN T SERPL HS-MCNC: 35 NG/L
TSH SERPL DL<=0.005 MIU/L-ACNC: 2.34 UIU/ML (ref 0.3–4.2)
UROBILINOGEN UR STRIP-MCNC: NORMAL MG/DL
VENTRICULAR RATE- MUSE: 90 BPM
WBC # BLD AUTO: 4.5 10E3/UL (ref 4–11)
WBC URINE: 1 /HPF

## 2025-01-16 PROCEDURE — 250N000011 HC RX IP 250 OP 636: Performed by: EMERGENCY MEDICINE

## 2025-01-16 PROCEDURE — 73030 X-RAY EXAM OF SHOULDER: CPT | Mod: 26 | Performed by: RADIOLOGY

## 2025-01-16 PROCEDURE — 99253 IP/OBS CNSLTJ NEW/EST LOW 45: CPT | Mod: GC | Performed by: STUDENT IN AN ORGANIZED HEALTH CARE EDUCATION/TRAINING PROGRAM

## 2025-01-16 PROCEDURE — 70450 CT HEAD/BRAIN W/O DYE: CPT

## 2025-01-16 PROCEDURE — 250N000013 HC RX MED GY IP 250 OP 250 PS 637

## 2025-01-16 PROCEDURE — 36415 COLL VENOUS BLD VENIPUNCTURE: CPT | Performed by: EMERGENCY MEDICINE

## 2025-01-16 PROCEDURE — 72125 CT NECK SPINE W/O DYE: CPT

## 2025-01-16 PROCEDURE — 82962 GLUCOSE BLOOD TEST: CPT

## 2025-01-16 PROCEDURE — 81001 URINALYSIS AUTO W/SCOPE: CPT | Performed by: EMERGENCY MEDICINE

## 2025-01-16 PROCEDURE — 36415 COLL VENOUS BLD VENIPUNCTURE: CPT

## 2025-01-16 PROCEDURE — 99222 1ST HOSP IP/OBS MODERATE 55: CPT | Mod: GC | Performed by: STUDENT IN AN ORGANIZED HEALTH CARE EDUCATION/TRAINING PROGRAM

## 2025-01-16 PROCEDURE — 97116 GAIT TRAINING THERAPY: CPT | Mod: GP

## 2025-01-16 PROCEDURE — 70498 CT ANGIOGRAPHY NECK: CPT | Mod: 26 | Performed by: RADIOLOGY

## 2025-01-16 PROCEDURE — 85025 COMPLETE CBC W/AUTO DIFF WBC: CPT | Performed by: EMERGENCY MEDICINE

## 2025-01-16 PROCEDURE — 71260 CT THORAX DX C+: CPT | Mod: 26 | Performed by: RADIOLOGY

## 2025-01-16 PROCEDURE — 73522 X-RAY EXAM HIPS BI 3-4 VIEWS: CPT

## 2025-01-16 PROCEDURE — 70496 CT ANGIOGRAPHY HEAD: CPT

## 2025-01-16 PROCEDURE — 250N000011 HC RX IP 250 OP 636

## 2025-01-16 PROCEDURE — 73522 X-RAY EXAM HIPS BI 3-4 VIEWS: CPT | Mod: 26 | Performed by: RADIOLOGY

## 2025-01-16 PROCEDURE — 93005 ELECTROCARDIOGRAM TRACING: CPT | Performed by: EMERGENCY MEDICINE

## 2025-01-16 PROCEDURE — 72125 CT NECK SPINE W/O DYE: CPT | Mod: 77

## 2025-01-16 PROCEDURE — 71260 CT THORAX DX C+: CPT

## 2025-01-16 PROCEDURE — 120N000002 HC R&B MED SURG/OB UMMC

## 2025-01-16 PROCEDURE — 97530 THERAPEUTIC ACTIVITIES: CPT | Mod: GP

## 2025-01-16 PROCEDURE — 73060 X-RAY EXAM OF HUMERUS: CPT | Mod: 26 | Performed by: RADIOLOGY

## 2025-01-16 PROCEDURE — 83605 ASSAY OF LACTIC ACID: CPT | Performed by: EMERGENCY MEDICINE

## 2025-01-16 PROCEDURE — 73060 X-RAY EXAM OF HUMERUS: CPT | Mod: LT

## 2025-01-16 PROCEDURE — 73090 X-RAY EXAM OF FOREARM: CPT | Mod: 26 | Performed by: RADIOLOGY

## 2025-01-16 PROCEDURE — 258N000003 HC RX IP 258 OP 636: Performed by: STUDENT IN AN ORGANIZED HEALTH CARE EDUCATION/TRAINING PROGRAM

## 2025-01-16 PROCEDURE — 250N000012 HC RX MED GY IP 250 OP 636 PS 637

## 2025-01-16 PROCEDURE — 84484 ASSAY OF TROPONIN QUANT: CPT | Performed by: EMERGENCY MEDICINE

## 2025-01-16 PROCEDURE — 84443 ASSAY THYROID STIM HORMONE: CPT | Performed by: EMERGENCY MEDICINE

## 2025-01-16 PROCEDURE — 70450 CT HEAD/BRAIN W/O DYE: CPT | Mod: 77

## 2025-01-16 PROCEDURE — 87637 SARSCOV2&INF A&B&RSV AMP PRB: CPT | Performed by: EMERGENCY MEDICINE

## 2025-01-16 PROCEDURE — 70496 CT ANGIOGRAPHY HEAD: CPT | Mod: 26 | Performed by: RADIOLOGY

## 2025-01-16 PROCEDURE — 72125 CT NECK SPINE W/O DYE: CPT | Mod: 26 | Performed by: RADIOLOGY

## 2025-01-16 PROCEDURE — 73090 X-RAY EXAM OF FOREARM: CPT | Mod: LT

## 2025-01-16 PROCEDURE — 97161 PT EVAL LOW COMPLEX 20 MIN: CPT | Mod: GP

## 2025-01-16 PROCEDURE — 93010 ELECTROCARDIOGRAM REPORT: CPT | Performed by: EMERGENCY MEDICINE

## 2025-01-16 PROCEDURE — 73030 X-RAY EXAM OF SHOULDER: CPT | Mod: RT,XS

## 2025-01-16 PROCEDURE — 82565 ASSAY OF CREATININE: CPT

## 2025-01-16 PROCEDURE — 83036 HEMOGLOBIN GLYCOSYLATED A1C: CPT

## 2025-01-16 PROCEDURE — 73030 X-RAY EXAM OF SHOULDER: CPT | Mod: LT

## 2025-01-16 PROCEDURE — 80177 DRUG SCRN QUAN LEVETIRACETAM: CPT | Performed by: STUDENT IN AN ORGANIZED HEALTH CARE EDUCATION/TRAINING PROGRAM

## 2025-01-16 PROCEDURE — 80053 COMPREHEN METABOLIC PANEL: CPT | Performed by: EMERGENCY MEDICINE

## 2025-01-16 PROCEDURE — 85610 PROTHROMBIN TIME: CPT | Performed by: EMERGENCY MEDICINE

## 2025-01-16 PROCEDURE — 74177 CT ABD & PELVIS W/CONTRAST: CPT | Mod: 26 | Performed by: RADIOLOGY

## 2025-01-16 RX ORDER — ATORVASTATIN CALCIUM 40 MG/1
40 TABLET, FILM COATED ORAL DAILY
Status: DISCONTINUED | OUTPATIENT
Start: 2025-01-16 | End: 2025-01-21 | Stop reason: HOSPADM

## 2025-01-16 RX ORDER — AMOXICILLIN 250 MG
1 CAPSULE ORAL 2 TIMES DAILY PRN
Status: DISCONTINUED | OUTPATIENT
Start: 2025-01-16 | End: 2025-01-21 | Stop reason: HOSPADM

## 2025-01-16 RX ORDER — LEVOTHYROXINE SODIUM 75 UG/1
75 TABLET ORAL DAILY
Status: DISCONTINUED | OUTPATIENT
Start: 2025-01-16 | End: 2025-01-21 | Stop reason: HOSPADM

## 2025-01-16 RX ORDER — LEVETIRACETAM 500 MG/1
500 TABLET ORAL 2 TIMES DAILY
Status: DISCONTINUED | OUTPATIENT
Start: 2025-01-16 | End: 2025-01-21 | Stop reason: HOSPADM

## 2025-01-16 RX ORDER — CARVEDILOL 12.5 MG/1
12.5 TABLET ORAL 2 TIMES DAILY WITH MEALS
Status: DISCONTINUED | OUTPATIENT
Start: 2025-01-16 | End: 2025-01-17

## 2025-01-16 RX ORDER — ASPIRIN 81 MG/1
81 TABLET, CHEWABLE ORAL DAILY
Status: DISCONTINUED | OUTPATIENT
Start: 2025-01-16 | End: 2025-01-21 | Stop reason: HOSPADM

## 2025-01-16 RX ORDER — IOPAMIDOL 755 MG/ML
80 INJECTION, SOLUTION INTRAVASCULAR ONCE
Status: COMPLETED | OUTPATIENT
Start: 2025-01-16 | End: 2025-01-16

## 2025-01-16 RX ORDER — CALCIUM CARBONATE 500 MG/1
1000 TABLET, CHEWABLE ORAL 4 TIMES DAILY PRN
Status: DISCONTINUED | OUTPATIENT
Start: 2025-01-16 | End: 2025-01-21 | Stop reason: HOSPADM

## 2025-01-16 RX ORDER — AMLODIPINE BESYLATE 5 MG/1
5 TABLET ORAL DAILY
Status: DISCONTINUED | OUTPATIENT
Start: 2025-01-16 | End: 2025-01-21 | Stop reason: HOSPADM

## 2025-01-16 RX ORDER — ENOXAPARIN SODIUM 100 MG/ML
40 INJECTION SUBCUTANEOUS EVERY 24 HOURS
Status: DISCONTINUED | OUTPATIENT
Start: 2025-01-16 | End: 2025-01-20

## 2025-01-16 RX ORDER — LANOLIN ALCOHOL/MO/W.PET/CERES
1000 CREAM (GRAM) TOPICAL DAILY
Status: DISCONTINUED | OUTPATIENT
Start: 2025-01-16 | End: 2025-01-21 | Stop reason: HOSPADM

## 2025-01-16 RX ORDER — GABAPENTIN 100 MG/1
100 CAPSULE ORAL AT BEDTIME
Status: DISCONTINUED | OUTPATIENT
Start: 2025-01-16 | End: 2025-01-21 | Stop reason: HOSPADM

## 2025-01-16 RX ORDER — LIDOCAINE 40 MG/G
CREAM TOPICAL
Status: DISCONTINUED | OUTPATIENT
Start: 2025-01-16 | End: 2025-01-16

## 2025-01-16 RX ORDER — LIDOCAINE 4 G/G
1 PATCH TOPICAL EVERY 24 HOURS
Status: DISCONTINUED | OUTPATIENT
Start: 2025-01-16 | End: 2025-01-21 | Stop reason: HOSPADM

## 2025-01-16 RX ORDER — ACETAMINOPHEN 325 MG/1
975 TABLET ORAL EVERY 8 HOURS
Status: DISCONTINUED | OUTPATIENT
Start: 2025-01-16 | End: 2025-01-21 | Stop reason: HOSPADM

## 2025-01-16 RX ORDER — NICOTINE POLACRILEX 4 MG
15-30 LOZENGE BUCCAL
Status: DISCONTINUED | OUTPATIENT
Start: 2025-01-16 | End: 2025-01-21 | Stop reason: HOSPADM

## 2025-01-16 RX ORDER — DULOXETIN HYDROCHLORIDE 20 MG/1
20 CAPSULE, DELAYED RELEASE ORAL DAILY
Status: DISCONTINUED | OUTPATIENT
Start: 2025-01-16 | End: 2025-01-21 | Stop reason: HOSPADM

## 2025-01-16 RX ORDER — AMOXICILLIN 250 MG
2 CAPSULE ORAL 2 TIMES DAILY PRN
Status: DISCONTINUED | OUTPATIENT
Start: 2025-01-16 | End: 2025-01-21 | Stop reason: HOSPADM

## 2025-01-16 RX ORDER — DEXTROSE MONOHYDRATE 25 G/50ML
25-50 INJECTION, SOLUTION INTRAVENOUS
Status: DISCONTINUED | OUTPATIENT
Start: 2025-01-16 | End: 2025-01-21 | Stop reason: HOSPADM

## 2025-01-16 RX ADMIN — INSULIN ASPART 1 UNITS: 100 INJECTION, SOLUTION INTRAVENOUS; SUBCUTANEOUS at 18:07

## 2025-01-16 RX ADMIN — ENOXAPARIN SODIUM 40 MG: 40 INJECTION SUBCUTANEOUS at 06:45

## 2025-01-16 RX ADMIN — CYANOCOBALAMIN TAB 1000 MCG 1000 MCG: 1000 TAB at 09:18

## 2025-01-16 RX ADMIN — DULOXETINE HYDROCHLORIDE 20 MG: 20 CAPSULE, DELAYED RELEASE ORAL at 10:11

## 2025-01-16 RX ADMIN — IOPAMIDOL 80 ML: 755 INJECTION, SOLUTION INTRAVENOUS at 03:36

## 2025-01-16 RX ADMIN — INSULIN ASPART 5 UNITS: 100 INJECTION, SOLUTION INTRAVENOUS; SUBCUTANEOUS at 10:10

## 2025-01-16 RX ADMIN — NIRMATRELVIR AND RITONAVIR 2 TABLET: KIT at 19:26

## 2025-01-16 RX ADMIN — ASPIRIN 81 MG CHEWABLE TABLET 81 MG: 81 TABLET CHEWABLE at 09:18

## 2025-01-16 RX ADMIN — LIDOCAINE 4% 1 PATCH: 40 PATCH TOPICAL at 06:45

## 2025-01-16 RX ADMIN — CARVEDILOL 12.5 MG: 12.5 TABLET, FILM COATED ORAL at 10:11

## 2025-01-16 RX ADMIN — ACETAMINOPHEN 975 MG: 325 TABLET, FILM COATED ORAL at 14:13

## 2025-01-16 RX ADMIN — GABAPENTIN 100 MG: 100 CAPSULE ORAL at 22:22

## 2025-01-16 RX ADMIN — SODIUM CHLORIDE 500 ML: 9 INJECTION, SOLUTION INTRAVENOUS at 14:12

## 2025-01-16 RX ADMIN — AMLODIPINE BESYLATE 5 MG: 5 TABLET ORAL at 09:18

## 2025-01-16 RX ADMIN — NIRMATRELVIR AND RITONAVIR 2 TABLET: KIT at 09:19

## 2025-01-16 RX ADMIN — ACETAMINOPHEN 975 MG: 325 TABLET, FILM COATED ORAL at 22:23

## 2025-01-16 RX ADMIN — LEVETIRACETAM 500 MG: 500 TABLET, FILM COATED ORAL at 09:18

## 2025-01-16 RX ADMIN — ACETAMINOPHEN 975 MG: 325 TABLET, FILM COATED ORAL at 06:45

## 2025-01-16 RX ADMIN — INSULIN ASPART 3 UNITS: 100 INJECTION, SOLUTION INTRAVENOUS; SUBCUTANEOUS at 12:33

## 2025-01-16 RX ADMIN — LEVOTHYROXINE SODIUM 75 MCG: 75 TABLET ORAL at 09:18

## 2025-01-16 RX ADMIN — CARVEDILOL 12.5 MG: 12.5 TABLET, FILM COATED ORAL at 18:01

## 2025-01-16 RX ADMIN — LEVETIRACETAM 500 MG: 500 TABLET, FILM COATED ORAL at 19:26

## 2025-01-16 ASSESSMENT — ACTIVITIES OF DAILY LIVING (ADL)
ADLS_ACUITY_SCORE: 77
ADLS_ACUITY_SCORE: 58
ADLS_ACUITY_SCORE: 77
ADLS_ACUITY_SCORE: 77
ADLS_ACUITY_SCORE: 58
ADLS_ACUITY_SCORE: 77
ADLS_ACUITY_SCORE: 77
ADLS_ACUITY_SCORE: 58
ADLS_ACUITY_SCORE: 58
ADLS_ACUITY_SCORE: 77
ADLS_ACUITY_SCORE: 58
ADLS_ACUITY_SCORE: 77
ADLS_ACUITY_SCORE: 77
ADLS_ACUITY_SCORE: 58
ADLS_ACUITY_SCORE: 77
ADLS_ACUITY_SCORE: 58

## 2025-01-16 NOTE — ED TRIAGE NOTES
Pt BIBA from home with c/o left leg moving slower than baseline. Pt has hx of strokes and has left sided facial drop and slow speech at baseline. LKW was earlier this morning.  by EMS. Pt lives with daughter at home and has dementia. EMS report multiple falls at home over the last few weeks and there is some bruising to the right shoulder.

## 2025-01-16 NOTE — PROGRESS NOTES
"ED PT Eval     01/16/25 1600   Appointment Info   Signing Clinician's Name / Credentials (PT) Jayden Lacey, PT, DPT   Living Environment   People in Home child(tiffany), adult   Current Living Arrangements apartment   Home Accessibility no concerns   Transportation Anticipated car, drives self   Living Environment Comments Lives in elevator building with daughter (is her PCA). Daughter is gone during the day at work for long periods of time. Per EMR, also has another PCA.   Self-Care   Usual Activity Tolerance moderate   Current Activity Tolerance fair   Regular Exercise No   Equipment Currently Used at Home walker, rolling;wheelchair, manual   Activity/Exercise/Self-Care Comment Patient is a poor historian. At baseline walks home distances mod I with FWW. Has support from daughter and PCA for all ADLs/IADLs. Uses WC for community navigation. States that she has been \"falling more\".   General Information   Onset of Illness/Injury or Date of Surgery 01/16/25   Referring Physician Crys Radford MD   Patient/Family Therapy Goals Statement (PT) None stated   Pertinent History of Current Problem (include personal factors and/or comorbidities that impact the POC) Per EMR \"Isabell Matthews is a 64 year old woman with historyof HTN, HLD, CKD IIIb, T1DM c/b neuropathy, ischemic and hemorrhagic strokes (right basal ganglia ischemic infarct in 2018, left basal ganglia hemorrhagic stroke 2021, right centrum semiovale & right chuck 2023) with residual R facial droop and vascular dementia, seizures, and recurrent UTI admitted with weakness in the setting of covid infection.\"   Existing Precautions/Restrictions fall   Cognition   Cognitive Status Comments conversational but with some confusion, poor memory   Range of Motion (ROM)   Range of Motion ROM is WFL   Strength (Manual Muscle Testing)   Strength (Manual Muscle Testing) Deficits observed during functional mobility   Strength Comments grossly deconditioned   Bed Mobility   Bed " Mobility sit-supine;supine-sit   Supine-Sit [place] (Bed Mobility) moderate assist (50% patient effort)   Sit-Supine [place] (Bed Mobility) moderate assist (50% patient effort)   Comment, (Bed Mobility) poor motor planning, noted functional weakness   Transfers   Transfers sit-stand transfer   Sit-Stand Transfer   Sit-Stand [place] (Transfers) contact guard   Assistive Device (Sit-Stand Transfers) walker, front-wheeled   Comment, (Sit-Stand Transfer) [place]   Gait/Stairs (Locomotion)   [place] Level (Gait) contact guard   Assistive Device (Gait) walker, front-wheeled   Comment, (Gait/Stairs) short, shuffling steps, bob ground clearance   Balance   Balance Comments CGA sitting EOB due to elevated bed height   Clinical Impression   Criteria for Skilled Therapeutic Intervention Yes, treatment indicated   PT Diagnosis (PT) impaired functional mobility   Influenced by the following impairments decreased activity tolerance, confusion, global deconditioning   Functional limitations due to impairments transfers, gait, stairs   Clinical Presentation (PT Evaluation Complexity) stable   Clinical Presentation Rationale clinical judgement   Clinical Decision Making (Complexity) low complexity   Planned Therapy Interventions (PT) bed mobility training;balance training;gait training;ROM (range of motion);stair training;strengthening;stretching;transfer training   Risk & Benefits of therapy have been explained evaluation/treatment results reviewed;care plan/treatment goals reviewed;risks/benefits reviewed;current/potential barriers reviewed;participants voiced agreement with care plan;participants included;patient   PT Total Evaluation Time   PT Eval, Low Complexity Minutes (61944) 8   Physical Therapy Goals   PT Frequency 6x/week   PT Predicted Duration/Target Date for Goal Attainment 01/30/25   PT Goals Bed Mobility;Transfers;Gait   PT: Bed Mobility Modified independent;Supine to/from  sit   PT: Transfers Modified independent;Sit to/from stand;Assistive device   PT: Gait Modified independent;Assistive device;25 feet   Interventions   Interventions Quick Adds Gait Training;Therapeutic Activity   PT Discharge Planning   PT Plan finalize recs, in room gait, bed mobility   PT Discharge Recommendation (DC Rec) Transitional Care Facility;home with assist   PT Rationale for DC Rec At this time, patient with impaired mobility and reports increased difficulty getting around at home with multiple recent falls. Currently recommend TCU although patient may be appropriate for return home pending progress in house and available home support. Patient requested PT not call her daughter this date. Will continue to follow and update recs as able.   PT Brief overview of current status Ax1 for short distance gait with FWW   PT Total Distance Amb During Session (feet) 20   Physical Therapy Time and Intention   Total Session Time (sum of timed and untimed services) 8         Jayden Lacey, PT, DPT

## 2025-01-16 NOTE — PROGRESS NOTES
Pt has been alert and oriented, denied any pain this am. R shoulder XR done, results pending. VSS on RA, afebrile. No Covid symptoms noted.

## 2025-01-16 NOTE — INTERIM SUMMARY
Neurology Interval Note  I spoke to the patient's daughter and examined the patient this AM. The patient has moderate dementia and is only able to provide a limited history. Knows she fell recent but is unsure why. The patient's daughter states the patient has been feeling unwell recently and had multiple falls at home. Noticed worsening of R facial droop and some disorientation. She questions R leg weakness as well, although notes that it seems like she generally had difficulty walking as opposed to specific weakness on one side. She offers herself that she occasionally sees evidence of her old stroke symptoms when she gets sick, and she remembers Isabell having R facial droop and R leg weakness around the time of her thalamic hemorrhage.      On my exam, the patient has a R facial droop but is otherwise demonstrating 5/5 strength in the upper and lower extremities. The patient had no complaint to me.         Plan:  Our impression is that her symptoms could be explained by generalized malaise from her COVID infection and EVON, and do not represent a new ischemic process. If her exam changes or if focality develops more clearly, it would be reasonable to repeat an MRI. For now, we would not recommend further workup and she should continue on her previously prescribed medications. Management of her diabetes will be of utmost importance. Neurology will sign off  -Continue previously prescribed therapies  -No indication for MRI right now  -Neurology to sign off  -See consult note from earlier in the morning for further details.             Exam:  NIHSS  1a. Level of Consciousness 0-->Alert, keenly responsive   1b. LOC Questions 1-->Answers one question correctly   1c. LOC Commands 0-->Performs both tasks correctly   2.   Best Gaze 0-->Normal   3.   Visual 0-->No visual loss   4.   Facial Palsy 1-->Minor paralysis (flattened nasolabial fold, asymmetry on smiling)   5a. Motor Arm, Left 0-->No drift, limb holds 90 (or 45)  "degrees for full 10 secs   5b. Motor Arm, Right 0-->No drift, limb holds 90 (or 45) degrees for full 10 secs   6a. Motor Leg, Left 0-->No drift, leg holds 30 degree position for full 5 secs   6b. Motor Leg, right 0-->No drift, leg holds 30 degree position for full 5 secs   7.   Limb Ataxia 0-->Absent   8.   Sensory 0-->Normal, no sensory loss   9.   Best Language 0-->No aphasia, normal   10. Dysarthria 0-->Normal   11. Extinction and Inattention  0-->No abnormality   Total 2 (01/16/25 9018)   Mental Status:  Awake and alert, oriented to birthday and \"hospital\" but not which hospital. Naming, repetition intact. Follows simple commands  Cranial Nerves:  Pupils equal, visual fields intact, EOMI,, facial sensation intact to light touch, mild R nasolabial fold flattening that disappears with activation, hearing not formally tested but intact to conversation, no dysarthria, tongue midline  Motor: 5/5 strength with deltoids, biceps, triceps,  strength, hip flexion, leg flexion, leg extension, dorsiflexion, plantarflexion  Reflexes:  3+ biceps, brachioradialis, patellars  Sensory:  Sensation intact to light touch bilaterally without extinction  Coordination:  No dysmetria with FNF or heel to shin   Station/Gait:  Deferred         "

## 2025-01-16 NOTE — H&P
[facility]    History and Physical - Medicine Service, [facility] TEAM        Date of Admission:  1/15/2025    Assessment & Plan   Isabell Matthews is a 64 year old woman with historyof HTN, HLD, CKD IIIb, T1DM c/b neuropathy, ischemic and hemorrhagic strokes (right basal ganglia ischemic infarct in 2018, left basal ganglia hemorrhagic stroke 2021, right centrum semiovale & right chuck 2023) with residual R facial droop and vascular dementia, seizures, and recurrent UTI admitted with weakness in the setting of covid infection.    Generalized weakness  Falls  Dementia  Covid 19  Presenting with weakness and falls - at baseline highly dependent on daughter for cares. Initial concern for stroke - CTH/CTA reassuring, neurology also evaluated w/o concerns for stroke. Labs unremarkable other than tested positive for covid (had symptoms about a week ago) which could certainly explain symptoms particularly given poor baseline.  Denies chest pain, EKG without change (compared to previous) and trop neg. Low c/f other infectious process at this time - denies symptoms and CT cap w/o acute findings.  -paxlovid, renally dosed  -no indication for steroids at this time  -DVT ppx with lovenox  -PT/OT  -fall precautions  -consider keppra level    R/o falls sequelae  CTC spine, xrays of left arm/shoulder, pelvis/hip without acute abnormality.  -R shoulder xray pending    H/o ischemic and hemorrhagic strokes (multiple)  Seizures  Hypertension  Hyperlipidemia  No acute concerns, see above. CTA with extensive multifocal areas of luminal irregularity, slightly progressed when compared to 3/7/2024, has neurology follow up as outpatient.   -c;w home amlodipine, carvedilol  -c/w home aspirin  -c/w  home keppra BID  -holding home statin iso paxlovid    Type 1 diabetes mellitus, poorly controlled  A1c 11.2%.   -glargine 10 units  -medium dose correction, hypoglycemia protocol  -A1c added to labs  -c/w  "home gabapentin for neuropathy    Hypothyroidism  -c/w home levothyroxine    H/o recurrent UTI  Low c/f for UTI at this time.  -monitor    Chronic kidney disease stage III  Renal function at baseline w/o electrolyte derangements    Depression  -c/w home duloxetine    Indeterminant hypodense focus in the left renal upper pole  -follow up as outpatient         Diet:  regular diet  DVT Prophylaxis: Enoxaparin (Lovenox) SQ  Parker Catheter: Not present  Fluids: none  Lines: None     Cardiac Monitoring: None  Code Status:  DNR/DNI    Clinically Significant Risk Factors Present on Admission                 # Drug Induced Platelet Defect: home medication list includes an antiplatelet medication   # Hypertension: Noted on problem list     # Dementia: noted on problem list           # Financial/Environmental Concerns:             To be officially staffed in AM.      Crsy Radford MD  Medicine Service, [facility]  Securely message with [employer] (more info)  Text page via [facility] Paging/Directory   See signed in provider for up to date coverage information      Post Rounds Addendum:     S- Patient stable this AM. Alert and oriented to self and situation - thought she was in [place], unsure of year. Reports she feels fine but her daughter was worried and told her she was \"sick\" and brought her to hospital. Denies fevers, chills, HA, shortness of breath or pain.     Agree with exam as documented by Dr. Radford.     A/P: I agree with the plan as documented above. Additional cares as below:     - Give 500 ml NS bolus slowly through day for slight EVON   - A1c 9.6 from 11.2, will continue PTA insulin, adjust as needed   - Agree w/ PT/OT evaluation, discussed with daughter and has strong preference for home therapies at discharge over inpatient tcu vs rehab   - Daughter would like phone call when provider in room - updated patient shari Andrea MD   Internal " Medicine-Pediatrics, PGY4  [facility]    ______________________________________________________________________    Chief Complaint   falls    History is obtained from patient and chart  Daughter is primary historian    History of Present Illness   Isabell Matthews is a 64 year old woman with historyof HTN, HLD, CKD IIIb, T1DM c/b neuropathy, ischemic and hemorrhagic strokes (right basal ganglia ischemic infarct in 2018, left basal ganglia hemorrhagic stroke 2021, right centrum semiovale & right chuck 2023) with residual R facial droop and vascular dementia, seizures, and recurrent UTI who presented to the ED 1/16 overnight after sustaining two falls at home and concerns for worsening balance issues.     A week ago both patient and daughter had upper respiratory symptoms and since then Isabell has been feeling off. Though she is largely dependent on cares from her daughter, she was even more reliant, needing more help in the bathroom, etc.     Multiple falls over the last week, though some issues with balance in the last few weeks as well. This was acutely worsened today, seemed to not want to bear weight on her lower extremities.     No fevers/chills, chest pain, dyspnea. No missed medication. No n/v/diarrhea.      Per neuro note:   Per daughter, these are the following residual deficits after her stroke: the patient pockets food in her mouth, R facial droop.ote:    At baseline, the patient requires 24/7 care from daughter for almost all ADL's and for all iADL's.   -Daughter manages medications, finances, hygiene tasks - patient requires significant cueing, and cooking. At baseline, patient is able to dress herself but for the past few week daughter has been managing as the patient hasn't been feeling as well. Usually walks short distances with a walker.   -Baseline has urinary and bowel incontinence (bowel incontinence newer for the past 6 months)    -She is typically oriented to self but not always to  location        Prior to Admission Medications        Physical Exam   Vital Signs: Temp: 97.7  F (36.5  C) Temp src: Oral BP: (!) 164/94 Pulse: 87   Resp: 16 SpO2: 97 % O2 Device: None (Room air)    Weight: 0 lbs 0 oz    In no acute distress, lying in bed  RRR, breathing comfortably on room air  Abdomen soft, NTND  4/5 strength in b/l upper extremities, maybe slightly worse on left side   CN 2-12 intact, no dysmetria noted  Reflexes and gait not tested  Awake, alert, oriented to self and location    Medical Decision Making       Please see A&P for additional details of medical decision making.      Data     I have personally reviewed the following data over the past 24 hrs:    4.5  \   11.4 (L)   / 183     142 107 37.5 (H) /  184 (H)   4.0 25 1.40 (H) \     ALT: 18 AST: 24 AP: 131 TBILI: 0.2   ALB: 3.6 TOT PROTEIN: 6.2 (L) LIPASE: N/A     Trop: 34 (H) BNP: N/A     TSH: 2.34 T4: N/A A1C: N/A     Procal: N/A CRP: N/A Lactic Acid: 1.3       INR:  0.96 PTT:  N/A   D-dimer:  N/A Fibrinogen:  N/A

## 2025-01-16 NOTE — ED NOTES
Emergency Department Patient Sign-out       Brief HPI:  This is a 64 year old female signed out to me by Dr. Youssef .  See initial ED Provider note for details of the presentation.            Significant Events prior to my assuming care: n/a      Exam:   Patient Vitals for the past 24 hrs:   BP Temp Temp src Pulse Resp SpO2   01/16/25 0315 -- -- -- 87 -- 97 %   01/16/25 0003 (!) 164/94 97.7  F (36.5  C) Oral 85 16 99 %     General: Patient is in no acute distress currently.  HEENT: Normocephalic atraumatic.    Neck: Supple  Cardiovascular: Heart rate normal  Pulmonary: Patient is in no respiratory distress  Extremities: No signs of any significant or life-threatening trauma.  Neurologic: No new focal neurologic deficits.       ED RESULTS:   Results for orders placed or performed during the hospital encounter of 01/15/25 (from the past 24 hours)   [place] Draw     Status: None    Collection Time: 01/16/25 12:10 AM    Narrative    The following orders were created for panel order [place] Draw.  Procedure                               Abnormality         Status                     ---------                               -----------         ------                     Extra Blue Top Tube[[id number]]                              Final result               Extra Red Top Tube[[id number]]                               Final result               Extra Green Top (Lithium...[[id number]]                      Final result               Extra Purple Top Tube[[id number]]                            Final result               Extra Green Top (Lithium...[[id number]]                      Final result                 Please view results for these tests on the individual orders.   Extra Blue Top Tube     Status: None    Collection Time: 01/16/25 12:10 AM   Result Value Ref Range    Hold Specimen JIC    Extra Red Top Tube     Status: None    Collection Time: 01/16/25 12:10 AM   Result Value Ref Range    Hold Specimen JIC    Extra Green  Top (Lithium Heparin) Tube     Status: None    Collection Time: 01/16/25 12:10 AM   Result Value Ref Range    Hold Specimen JIC    Extra Purple Top Tube     Status: None    Collection Time: 01/16/25 12:10 AM   Result Value Ref Range    Hold Specimen JIC    Extra Green Top (Lithium Heparin) ON ICE     Status: None    Collection Time: 01/16/25 12:10 AM   Result Value Ref Range    Hold Specimen JIC    CBC with platelets differential     Status: Abnormal    Collection Time: 01/16/25 12:10 AM    Narrative    The following orders were created for panel order CBC with platelets differential.  Procedure                               Abnormality         Status                     ---------                               -----------         ------                     CBC with platelets and d...[[id number]]  Abnormal            Final result                 Please view results for these tests on the individual orders.   Comprehensive metabolic panel     Status: Abnormal    Collection Time: 01/16/25 12:10 AM   Result Value Ref Range    Sodium 142 135 - 145 mmol/L    Potassium 4.0 3.4 - 5.3 mmol/L    Carbon Dioxide (CO2) 25 22 - 29 mmol/L    Anion Gap 10 7 - 15 mmol/L    Urea Nitrogen 37.5 (H) 8.0 - 23.0 mg/dL    Creatinine 1.40 (H) 0.51 - 0.95 mg/dL    GFR Estimate 41 (L) >60 mL/min/1.73m2    Calcium 9.1 8.8 - 10.4 mg/dL    Chloride 107 98 - 107 mmol/L    Glucose 184 (H) 70 - 99 mg/dL    Alkaline Phosphatase 131 40 - 150 U/L    AST 24 0 - 45 U/L    ALT 18 0 - 50 U/L    Protein Total 6.2 (L) 6.4 - 8.3 g/dL    Albumin 3.6 3.5 - 5.2 g/dL    Bilirubin Total 0.2 <=1.2 mg/dL   INR     Status: Normal    Collection Time: 01/16/25 12:10 AM   Result Value Ref Range    INR 0.96 0.85 - 1.15   Lactic acid whole blood with 1x repeat in 2 hr when >2     Status: Normal    Collection Time: 01/16/25 12:10 AM   Result Value Ref Range    Lactic Acid, Initial 1.3 0.7 - 2.0 mmol/L   Troponin T, High Sensitivity     Status: Abnormal    Collection Time:  01/16/25 12:10 AM   Result Value Ref Range    Troponin T, High Sensitivity 35 (H) <=14 ng/L   TSH with free T4 reflex     Status: Normal    Collection Time: 01/16/25 12:10 AM   Result Value Ref Range    TSH 2.34 0.30 - 4.20 uIU/mL   CBC with platelets and differential     Status: Abnormal    Collection Time: 01/16/25 12:10 AM   Result Value Ref Range    WBC Count 4.5 4.0 - 11.0 10e3/uL    RBC Count 4.06 3.80 - 5.20 10e6/uL    Hemoglobin 11.4 (L) 11.7 - 15.7 g/dL    Hematocrit 35.7 35.0 - 47.0 %    MCV 88 78 - 100 fL    MCH 28.1 26.5 - 33.0 pg    MCHC 31.9 31.5 - 36.5 g/dL    RDW 15.3 (H) 10.0 - 15.0 %    Platelet Count 183 150 - 450 10e3/uL    % Neutrophils 54 %    % Lymphocytes 36 %    % Monocytes 7 %    % Eosinophils 2 %    % Basophils 1 %    % Immature Granulocytes 0 %    NRBCs per 100 WBC 0 <1 /100    Absolute Neutrophils 2.4 1.6 - 8.3 10e3/uL    Absolute Lymphocytes 1.6 0.8 - 5.3 10e3/uL    Absolute Monocytes 0.3 0.0 - 1.3 10e3/uL    Absolute Eosinophils 0.1 0.0 - 0.7 10e3/uL    Absolute Basophils 0.0 0.0 - 0.2 10e3/uL    Absolute Immature Granulocytes 0.0 <=0.4 10e3/uL    Absolute NRBCs 0.0 10e3/uL   Neurology Stroke Adult IP Consult: worsening LE weakness; Consultant may enter orders: Yes; Requesting provider? ED Provider     Status: None ()    Collection Time: 01/16/25 12:41 AM    Eveline Philippe MD     1/16/2025  3:17 AM        [facility]    Stroke Consult Note    Reason for Consult:  BLE weakness L > R     Chief Complaint: Extremity Weakness       HPI  Isabell Matthews is a 64 year old woman with historyof HTN, HLD, CKD   IIIb, T1DM c/b neuropathy, ischemic and hemorrhagic strokes   (right basal ganglia ischemic infarct in 2018, left basal ganglia   hemorrhagic stroke 2021, right centrum semiovale & right chuck   2023) with residual R facial droop and vascular dementia,   seizures, and recurrent UTI who presented to the ED 1/16   overnight after sustaining  "two falls at home and concerns for   worsening balance issues - favoring her left leg, much more   significant over the past 3-4 days per daughter. Reportedly   recovered from URI symptoms 5 days prior. Neurology consulted for   BLE weakness L > R with question of stroke.     History obtained frm daughter over the phone:     Patient fell twice today with her walker, which is very unusual   for her.     1 week ago, patient and daughter were sick - described as \"head   cold\": no fever but with cough and rhinorrhea. However, both have   felt improved for the past 5 days.     3-4 days ago, patient did not seem like she was feeling good. She   was asking for more help in the bathroom, getting her diaper off   and on.    She has not been \"great on her feet\" for the past couple of   weeks. Has fallen 3-4 times in the past 1 week. Today, she was   \"really bad on her feet\" and fell down. She seems to not want to   put all her weight down on her right leg. Left leg also \"shaky\".    At 10:30 pm 1/15, she was slumped over - did not look normal and   daughter felt the patient's walking was \"really bad\". Seems to   have worse speech than normal (described as more slurred towards   the end of words). Has worsening pocketing of food.     Per daughter, these are the following residual deficits after her   stroke: the patient pockets food in her mouth, R facial droop.    At baseline, the patient requires 24/7 care from daughter for   almost all ADL's and for all iADL's.   -Daughter manages medications, finances, hygiene tasks - patient   requires significant cueing, and cooking. At baseline, patient is   able to dress herself but for the past few week daughter has been   managing as the patient hasn't been feeling as well. Usually   walks short distances with a walker.   -Baseline has urinary and bowel incontinence (bowel incontinence   newer for the past 6 months)    -She is typically oriented to self but not always to " "location    No concerns for missed medications.     Impression  Isabell Matthews is a 64 year old woman with history of   cardiovascular risk factors and recurrent UTI who presents to the   ED 1/16 after sustaining two falls at home within the same day   and daughter with concerns for worsening balance issues -   favoring her left over right leg. Neurology consulted for \"BLE   weakness L > R\" with question of stroke. On exam, the patient   does not have any objective weakness and the only focal deficit   noted is her baseline R nasolabial fold flattening; she does   however wince to pain when I touch her L deltoid and L inguinal   region. She did however have mild bilateral shoulder abduction   weakness on my exam (below baseline per prior neurology notes),   indicative of a possible underlying toxic / metabolic cause that   could be responsible for generalized weakness.    As such, agree with ED performing toxic metabolic, infectious   workup and rule out fractures that may cause pain to the   patient's L shoulder / hip. Due to no new focal deficits on exam,   suspicion for new stroke is low.    Addendum: Patient tested positive for COVID-19 in the ED. This is   likely responsible for the patient's intermittent weakness and   malaise leading to more frequent falls.     #COVID-19 infection  -Continue PTA aspirin 81 mg daily and atorvastatin 40 mg daily   for secondary stroke prevention  -Continue PTA keppra 500 mg BID for history of seizures   -No further imaging required from neurology perspective     Patient Follow-up    -Follow up with outpatient neurologist, Dr. Medina as scheduled   10/20/2025    Thank you for this consult. No further stroke evaluation is   recommended, so we will sign off. Please contact us with any   additional questions.    The patient was discussed with Stroke Fellow, Dr. Hernandez.  The   Stroke Staff is Dr. Wilson.    Eveline Sanabria MD  Neurology " Resident  _____________________________________________________    Clinically Significant Risk Factors Present on Admission                 # Drug Induced Platelet Defect: home medication list includes an   antiplatelet medication   # Hypertension: Noted on problem list     # Dementia: noted on problem list           # Financial/Environmental Concerns:        # CKD, Stage 3b (GFR 30-44): Will monitor and treat as   appropriate  - last Cr =  1.40 mg/dL (Ref range: 0.51 - 0.95 mg/dL)  - last GFR = 41 mL/min/1.73m2 (Ref range: >60 mL/min/1.73m2)     Past Medical History    Past Medical History:   Diagnosis Date    Chronic pain     Coronary atherosclerosis 6/14/2016    Essential hypertension     Ex-cigarette smoker     quit 7/2018 over 35 years    Ex-cigarette smoker     Hyperlipidemia     Hypothyroid     Seizures (H)     Stroke (H)     Vascular dementia (H)      Medications   Home Meds  Prior to Admission medications    Medication Sig Start Date End Date Taking? Authorizing Provider   acetaminophen (TYLENOL) 325 MG tablet Take 3 tablets (975 mg) by   mouth every 8 hours. 9/25/24   Dasha Riggs APRN CNP   amLODIPine (NORVASC) 5 MG tablet Take 1 tablet (5 mg) by mouth   daily. 11/21/24   Ronnie Kellogg MD   aspirin (ASA) 81 MG chewable tablet Take 1 tablet (81 mg) by   mouth daily 9/14/23   Nighat Menard MD   atorvastatin (LIPITOR) 40 MG tablet Take 1 tablet (40 mg) by   mouth daily. 10/7/24   Bony Castaneda MD   blood glucose (NO BRAND SPECIFIED) test strip Use to test blood   sugar 4 times daily or as directed. 12/31/24   Ronnie Kellogg MD   blood glucose (NO BRAND SPECIFIED) test strip Use to test blood   sugar 8 times daily or as directed. 11/21/24   Ronnie Kellogg MD   blood glucose (TRUE METRIX BLOOD GLUCOSE TEST) test strip USE TO   TEST BLOOD SUGAR 4 TO 5 TIMES DAILY OR AS DIRECTED 9/14/23     Nighat Menard MD   blood glucose monitoring (NO BRAND SPECIFIED) meter  device kit   Use to test blood sugar 4 times daily.  Please provide glucose   meter that is covered by insurance. 4/14/23   Bony Castaneda MD   blood glucose monitoring (ONE TOUCH ULTRA MINI) meter device kit   Use to test blood sugar 4 times daily or as directed. 12/31/24     Ronnie Kellogg MD   carvedilol (COREG) 12.5 MG tablet Take 1 tablet (12.5 mg) by   mouth 2 times daily (with meals). 10/7/24   Bony Castaneda MD   cyanocobalamin (VITAMIN B-12) 1000 MCG tablet Take 1 tablet   (1,000 mcg) by mouth daily 9/14/23   Nighat Menard MD   DULoxetine (CYMBALTA) 20 MG capsule Take 1 capsule (20 mg) by   mouth daily. 10/7/24   Bony Castaneda MD   gabapentin (NEURONTIN) 100 MG capsule Take 1 capsule (100 mg) by   mouth at bedtime. 10/7/24   Bony Castaneda MD   Glucagon (GVOKE HYPOPEN) 1 MG/0.2ML pen Inject the contents of 1   device under the skin into lower abdomen, outer thigh, or outer   upper arm as needed for hypoglycemia. If no response after 15   minutes, additional 1 mg dose from a new device may be injected   while waiting for emergency assistance. 10/7/24   Bony Castaneda MD   insulin aspart (NOVOLOG FLEXPEN) 100 UNIT/ML pen Inject 2 units   with meals plus correction scale additional three times daily   before meals  as follows  Pre-Meal  - 200 give additional 1   unit.  For Pre-Meal  -250 give 2 additional units.  For   Pre-Meal -300 give 3 additional units.  For Pre-Meal BG   301-350 give 4 additional units.  For Pre-Meal -400 give 5   additional units Max 25 units daily 10/7/24   Bony Castaneda MD   insulin aspart (NOVOLOG PENFILL) 100 UNIT/ML cartridge Inject 2-4   Units Subcutaneous 4 times daily (with meals and nightly)   diabetes 6/18/24   Jess Nair MD   insulin glargine (LANTUS PEN) 100 UNIT/ML pen Inject 19 Units   subcutaneously at bedtime. 10/7/24   Bony Castaneda MD   insulin pen needle (31G X 8 MM) 31G X 8 MM  miscellaneous Use 4   pen needles daily or as directed. 9/14/23   Nighat Menard MD   levETIRAcetam (KEPPRA) 500 MG tablet Take 1 tablet (500 mg) by   mouth 2 times daily. 10/7/24   Bony Castaneda MD   Lidocaine (LIDOCARE) 4 % Patch Place 1 patch over 12 hours onto   the skin every 24 hours. To prevent lidocaine toxicity, patient   should be patch free for 12 hrs daily. 9/25/24   Dasha Riggs APRN CNP   loratadine (CLARITIN) 10 MG tablet Take 1 tablet (10 mg) by mouth   daily. 10/7/24   Bony Castaneda MD   methocarbamol (ROBAXIN) 500 MG tablet Take 1 tablet (500 mg) by   mouth daily as needed for muscle spasms. 11/21/24   Ronnie Kellogg MD   SYNTHROID 75 MCG tablet Take 1 tablet (75 mcg) by mouth daily.   10/7/24   Bony Castaneda MD   zinc oxide (DESITIN) 20 % external ointment Apply topically as   needed for dry skin or irritation 9/14/23   Nighat Menard MD       Scheduled Meds  Current Facility-Administered Medications   Medication Dose Route Frequency Provider Last Rate Last Admin    iopamidol (ISOVUE-370) solution 80 mL  80 mL Intravenous Once   Odalys Youssef MD        sodium chloride (PF) 0.9% PF flush 90 mL  90 mL Intravenous Once   Odalys Youssef MD           Infusion Meds  Current Facility-Administered Medications   Medication Dose Route Frequency Provider Last Rate Last Admin       Allergies   Allergies   Allergen Reactions    Seasonal Allergies           PHYSICAL EXAMINATION   Temp:  [97.7  F (36.5  C)] 97.7  F (36.5  C)  Pulse:  [85] 85  Resp:  [16] 16  BP: (164)/(94) 164/94  SpO2:  [99 %] 99 %    General Exam  General:  Patient laying in bed without acute distress   HEENT:  Normocephalic, atraumatic   Cardio:  Regular rate per vitals  Pulmonary:  No respiratory distress, breathing comfortably on   room air   Abdomen:  Non-distended   Extremities: no significant edema    Skin:  Warm, dry, intact     Neuro Exam  Mental Status:  Awake and alert,  "oriented to self and location   (\"hospital\") but not age or month. Paucity of speech but naming,   repetition intact. Follows simple and complex cross body 2 step   commands  Cranial Nerves:  Pupils equal, visual fields intact, EOMI with   saccadic pursuit, facial sensation intact to light touch, mild R   nasolabial fold flattening that disappears with activation,   hearing not formally tested but intact to conversation, no   dysarthria, tongue midline  Motor: no drift in all extremities, possible subtle pronation in   L arm without any drift. 4/5 strength bilateral upper extremities   with tenderness to L deltoid (patient feels like it's bruised),   otherwise 5/5 strength elsewhere - although did endorse pain and   tenderness to L inguinal fold when performing L hip flexion   testing     Reflexes:  3+ biceps, brachioradialis, patellars, 1+ achilles;   bilateral upgoing toes. Absent Rodrigues's  Sensory:  Sensation intact to light touch bilaterally without   extinction  Coordination:  No dysmetria with FNF or heel to shin   Station/Gait:  Deferred      Stroke Scales    NIHSS  1a. Level of Consciousness 0-->Alert, keenly responsive   1b. LOC Questions 2-->Answers neither question correctly   1c. LOC Commands 0-->Performs both tasks correctly   2.   Best Gaze 0-->Normal   3.   Visual 0-->No visual loss   4.   Facial Palsy 1-->Minor paralysis (flattened nasolabial fold,   asymmetry on smiling)   5a. Motor Arm, Left 0-->No drift, limb holds 90 (or 45) degrees   for full 10 secs   5b. Motor Arm, Right 0-->No drift, limb holds 90 (or 45) degrees   for full 10 secs   6a. Motor Leg, Left 0-->No drift, leg holds 30 degree position   for full 5 secs   6b. Motor Leg, right 0-->No drift, leg holds 30 degree position   for full 5 secs   7.   Limb Ataxia 0-->Absent   8.   Sensory 0-->Normal, no sensory loss   9.   Best Language 0-->No aphasia, normal   10. Dysarthria 0-->Normal   11. Extinction and Inattention  0-->No abnormality "   Total 3 (01/16/25 0200)       Imaging  I personally reviewed all imaging; relevant findings per HPI.    Labs Data   CBC  Recent Labs   Lab 01/16/25  0010   WBC 4.5   RBC 4.06   HGB 11.4*   HCT 35.7        Basic Metabolic Panel   Recent Labs   Lab 01/16/25  0010      POTASSIUM 4.0   CHLORIDE 107   CO2 25   BUN 37.5*   CR 1.40*   *   VERONICA 9.1     Liver Panel  Recent Labs   Lab 01/16/25  0010   PROTTOTAL 6.2*   ALBUMIN 3.6   BILITOTAL 0.2   ALKPHOS 131   AST 24   ALT 18     INR    Recent Labs   Lab Test 01/16/25  0010 09/23/24  1325 06/16/24  0824   INR 0.96 1.02 0.98           Stroke Consult Data Data   This was a non-emergent, non-telestroke consult.   EKG 12-lead, tracing only     Status: None (Preliminary result)    Collection Time: 01/16/25  1:21 AM   Result Value Ref Range    Systolic Blood Pressure  mmHg    Diastolic Blood Pressure  mmHg    Ventricular Rate 90 BPM    Atrial Rate 90 BPM    WV Interval 150 ms    QRS Duration 92 ms     ms    QTc 467 ms    P Axis 46 degrees    R AXIS 46 degrees    T Axis 18 degrees    Interpretation ECG       Sinus rhythm  Lateral infarct , age undetermined  Possible Inferior infarct , age undetermined  Abnormal ECG     Influenza A/B, RSV and SARS-CoV2 PCR (COVID-19) Nasopharyngeal     Status: Abnormal    Collection Time: 01/16/25  1:26 AM    Specimen: Nasopharyngeal; Swab   Result Value Ref Range    Influenza A PCR Negative Negative    Influenza B PCR Negative Negative    RSV PCR Negative Negative    SARS CoV2 PCR Positive (A) Negative    Narrative    Testing was performed using the Xpert Xpress CoV2/Flu/RSV Assay on the Cepheid GeneXpert Instrument. This test should be ordered for the detection of SARS-CoV2, influenza, and RSV viruses in individuals with signs and symptoms of respiratory tract infection. This test is for in vitro diagnostic use under the [place] FDA for laboratories certified under CLIA to perform high or moderate complexity testing. This  test has been [place] FDA cleared. A negative result does not rule out the presence of PCR inhibitors in the specimen or target RNA in concentration below the limit of detection for the assay. If only one viral target is positive but coinfection with multiple targets is suspected, the sample should be re-tested with another FDA cleared, approved, or authorized test, if coninfection would change clinical management. This test was validated by the [facility]. These laboratories are certified under the Clinical Laboratory Improvement Amendments of 1988 (CLIA-88) as qualified to perfom high complexity laboratory testing.   XR Pelvis and Hip Bilateral 2 Views     Status: None    Collection Time: 01/16/25  2:43 AM    Narrative    EXAM: XR PELVIS AND HIP BILATERAL 2 VIEWS  LOCATION: [facility]  DATE: 1/16/2025    INDICATION: fall, ? injury  COMPARISON: 6/16/2023      Impression    IMPRESSION: Bilateral hip arthroplasties. No displaced periprosthetic fracture. No evidence of dislocation. The pelvic ring appears intact. Atherosclerotic vascular calcifications.   Radius/Ulna XR,  PA &LAT, left     Status: None    Collection Time: 01/16/25  2:46 AM    Narrative    EXAM: XR FOREARM LEFT 2 VIEWS  LOCATION: [facility]  DATE: 1/16/2025    INDICATION: fall, pain  COMPARISON: None.      Impression    IMPRESSION: No displaced radius or ulna fracture. Antecubital fossa IV catheter tubing. Atherosclerotic vascular calcifications.   Humerus XR,  G/E 2 views, left     Status: None    Collection Time: 01/16/25  2:47 AM    Narrative    EXAM: LEFT SHOULDER AND HUMERUS 4 VIEWS  LOCATION: [facility]  DATE/TIME: 1/16/2025 2:47 AM CST    INDICATION: Fall. Pain.  COMPARISON: None.      Impression    IMPRESSION: No visualized acute fracture or malalignment of the left shoulder or humerus.    XR  Shoulder Left 2 Views     Status: None    Collection Time: 01/16/25  2:47 AM    Narrative    EXAM: LEFT SHOULDER AND HUMERUS 4 VIEWS  LOCATION: [facility]  DATE/TIME: 1/16/2025 2:47 AM CST    INDICATION: Fall. Pain.  COMPARISON: None.      Impression    IMPRESSION: No visualized acute fracture or malalignment of the left shoulder or humerus.    UA with Microscopic reflex to Culture     Status: Abnormal    Collection Time: 01/16/25  2:59 AM    Specimen: Urine, Catheter   Result Value Ref Range    Color Urine Light Yellow Colorless, Straw, Light Yellow, Yellow    Appearance Urine Slightly Cloudy (A) Clear    Glucose Urine >=1000 (A) Negative mg/dL    Bilirubin Urine Negative Negative    Ketones Urine Negative Negative mg/dL    Specific Gravity Urine 1.020 1.003 - 1.035    Blood Urine Negative Negative    pH Urine 5.5 5.0 - 7.0    Protein Albumin Urine 30 (A) Negative mg/dL    Urobilinogen Urine Normal Normal, 2.0 mg/dL    Nitrite Urine Negative Negative    Leukocyte Esterase Urine Negative Negative    Mucus Urine Present (A) None Seen /LPF    RBC Urine 1 <=2 /HPF    WBC Urine 1 <=5 /HPF    Squamous Epithelials Urine <1 <=1 /HPF    Narrative    Urine Culture not indicated   Troponin T, High Sensitivity     Status: Abnormal    Collection Time: 01/16/25  2:59 AM   Result Value Ref Range    Troponin T, High Sensitivity 34 (H) <=14 ng/L   Head CT w/o contrast     Status: None    Collection Time: 01/16/25  3:59 AM    Narrative    EXAM: CT HEAD W/O CONTRAST, CTA HEAD NECK W CONTRAST  LOCATION: [facility]  DATE: 1/16/2025    INDICATION: fall, weakness  COMPARISON: 8/23/2024  CONTRAST: 80ml isovue 370  TECHNIQUE: Head and neck CT angiogram with IV contrast. Noncontrast head CT followed by axial helical CT images of the head and neck vessels obtained during the arterial phase of intravenous contrast administration. Axial 2D reconstructed  images and   multiplanar 3D MIP reconstructed images of the head and neck vessels were performed by the technologist. Dose reduction techniques were used. All stenosis measurements made according to NASCET criteria unless otherwise specified.    FINDINGS:   NONCONTRAST HEAD CT:   INTRACRANIAL CONTENTS: No intracranial hemorrhage, extraaxial collection, or mass effect.  No CT evidence of acute infarct. Moderate presumed chronic small vessel ischemic changes. Chronic infarct left basal ganglia and right insular cortex. Mild to   moderate generalized volume loss. No hydrocephalus. Chronic lacunar infarcts bilateral thalami.     VISUALIZED ORBITS/SINUSES/MASTOIDS: No intraorbital abnormality. No paranasal sinus mucosal disease. No middle ear or mastoid effusion.    BONES/SOFT TISSUES: No acute abnormality.    HEAD CTA:  ANTERIOR CIRCULATION: Extensive multifocal areas of luminal irregularity is slightly progressed when compared to 3/7/2024. The progression is most notable at the left A2 segment. No branch occlusion, aneurysm or AVM. [employer] [facility] [place] anatomy.    POSTERIOR CIRCULATION: Extensive multifocal areas of luminal irregularity. No branch occlusion, significant flow-limiting stenosis, aneurysm or AVM. Balanced vertebral arteries supply a normal basilar artery.     DURAL VENOUS SINUSES: Expected enhancement of the major dural venous sinuses.    NECK CTA:  RIGHT CAROTID: No measurable stenosis or dissection.    LEFT CAROTID: Atherosclerotic plaque results in less than 50% stenosis in the left ICA. No dissection.    VERTEBRAL ARTERIES: No focal stenosis or dissection. Balanced vertebral arteries.    AORTIC ARCH: Classic aortic arch anatomy with no significant stenosis at the origin of the great vessels.    NONVASCULAR STRUCTURES: Unremarkable.      Impression    IMPRESSION:   HEAD CT:  No acute intracranial process.    HEAD CTA:  1.  Extensive multifocal areas of luminal irregularity are slightly progressed  when compared to 3/7/2024. The progression is most notable at the left A2 segment.  2.  No branch occlusion, aneurysm or AVM.    NECK CTA:  No flow limiting stenosis or dissection.     CT Cervical Spine w/o Contrast     Status: None    Collection Time: 01/16/25  4:00 AM    Narrative    EXAM: CT CERVICAL SPINE W/O CONTRAST  LOCATION: [facility]  DATE: 1/16/2025    INDICATION: falls,, ? injury, pain  COMPARISON: CTA head neck to 3/7/2024  TECHNIQUE: Routine CT Cervical Spine without IV contrast. Multiplanar reformats. Dose reduction techniques were used.    FINDINGS:  VERTEBRA: Vertebral body height is normal. Slight anterolisthesis of C3 and C7. No fracture or posttraumatic subluxation.     CANAL/FORAMINA: No high-grade central canal stenosis. Mild to moderate multilevel neural foraminal narrowing.    PARASPINAL: No extraspinal abnormality.      Impression    IMPRESSION:  1.  No fracture or posttraumatic subluxation.  2.  No high-grade spinal canal or neural foraminal stenosis.     CTA Head Neck w Contrast     Status: None    Collection Time: 01/16/25  4:05 AM    Narrative    EXAM: CT HEAD W/O CONTRAST, CTA HEAD NECK W CONTRAST  LOCATION: [facility]  DATE: 1/16/2025    INDICATION: fall, weakness  COMPARISON: 8/23/2024  CONTRAST: 80ml isovue 370  TECHNIQUE: Head and neck CT angiogram with IV contrast. Noncontrast head CT followed by axial helical CT images of the head and neck vessels obtained during the arterial phase of intravenous contrast administration. Axial 2D reconstructed images and   multiplanar 3D MIP reconstructed images of the head and neck vessels were performed by the technologist. Dose reduction techniques were used. All stenosis measurements made according to NASCET criteria unless otherwise specified.    FINDINGS:   NONCONTRAST HEAD CT:   INTRACRANIAL CONTENTS: No intracranial hemorrhage, extraaxial  collection, or mass effect.  No CT evidence of acute infarct. Moderate presumed chronic small vessel ischemic changes. Chronic infarct left basal ganglia and right insular cortex. Mild to   moderate generalized volume loss. No hydrocephalus. Chronic lacunar infarcts bilateral thalami.     VISUALIZED ORBITS/SINUSES/MASTOIDS: No intraorbital abnormality. No paranasal sinus mucosal disease. No middle ear or mastoid effusion.    BONES/SOFT TISSUES: No acute abnormality.    HEAD CTA:  ANTERIOR CIRCULATION: Extensive multifocal areas of luminal irregularity is slightly progressed when compared to 3/7/2024. The progression is most notable at the left A2 segment. No branch occlusion, aneurysm or AVM. Standard [place] anatomy.    POSTERIOR CIRCULATION: Extensive multifocal areas of luminal irregularity. No branch occlusion, significant flow-limiting stenosis, aneurysm or AVM. Balanced vertebral arteries supply a normal basilar artery.     DURAL VENOUS SINUSES: Expected enhancement of the major dural venous sinuses.    NECK CTA:  RIGHT CAROTID: No measurable stenosis or dissection.    LEFT CAROTID: Atherosclerotic plaque results in less than 50% stenosis in the left ICA. No dissection.    VERTEBRAL ARTERIES: No focal stenosis or dissection. Balanced vertebral arteries.    AORTIC ARCH: Classic aortic arch anatomy with no significant stenosis at the origin of the great vessels.    NONVASCULAR STRUCTURES: Unremarkable.      Impression    IMPRESSION:   HEAD CT:  No acute intracranial process.    HEAD CTA:  1.  Extensive multifocal areas of luminal irregularity are slightly progressed when compared to 3/7/2024. The progression is most notable at the left A2 segment.  2.  No branch occlusion, aneurysm or AVM.    NECK CTA:  No flow limiting stenosis or dissection.     CT Chest/Abdomen/Pelvis w Contrast     Status: None    Collection Time: 01/16/25  4:10 AM    Narrative    EXAM: CT CHEST/ABDOMEN/PELVIS W CONTRAST  LOCATION:  [facility]  DATE: 1/16/2025    INDICATION: recent URI sx's, weakness, falls, ? infection, injuries from falls, etc.  COMPARISON: CT from 9/23/2024 and 6/16/2023.  TECHNIQUE: CT scan of the chest, abdomen, and pelvis was performed following injection of IV contrast. Multiplanar reformats were obtained. Dose reduction techniques were used.   CONTRAST: 80ml isovue 370    FINDINGS:   LUNGS AND PLEURA: Dependent subsegmental atelectasis in the lower lobes. No acute airspace disease. No pneumothorax or pleural effusion.    MEDIASTINUM/AXILLAE: Normal heart size. Small pericardial effusion, similar to prior exam. Extensive coronary artery calcifications.    CORONARY ARTERY CALCIFICATION: Severe.    HEPATOBILIARY: Nondistended gallbladder. Liver within normal limits.    PANCREAS: Normal.    SPLEEN: Normal.    ADRENAL GLANDS: Normal.    KIDNEYS/BLADDER: Hypodense focus in the left renal upper pole measuring 3.1 cm, not a simple cyst by density criteria. There is cortical thinning of the left renal upper pole. There is a benign cyst in the right kidney requiring no follow-up. No   hydronephrosis. Bladder is within normal limits.    BOWEL: No bowel obstruction or free air. Normal appendix.    LYMPH NODES: Normal.    VASCULATURE: Severe aortoiliac atherosclerotic calcification without aneurysm.    PELVIC ORGANS: There is a 4.6 x 4.4 cm heterogeneous focus in the right hemipelvis, similar to seen on comparison exams and may reflect an exophytic, subserosal uterine fibroid. No significant free fluid.    MUSCULOSKELETAL: Severe thoracic spine degenerative disc change with accentuated thoracic kyphosis. There are multiple chronic rib fracture deformities, with a possible subacute fracture of the posterior left 11th rib. Bilateral hip arthroplasties.      Impression    IMPRESSION:  1.  There are old bilateral rib fractures, but no acute, displaced fracture is evident.    2.  No solid  organ injury.    3.  Severe coronary artery disease.    4.  Indeterminant hypodense focus in the left renal upper pole, not a simple cyst by density criteria. Recommend follow-up renal ultrasound when clinically feasible to differentiate complex cyst from solid lesion.       ED MEDICATIONS:   Medications   iopamidol (ISOVUE-370) solution 80 mL (80 mLs Intravenous $Given 1/16/25 0336)   sodium chloride (PF) 0.9% PF flush 90 mL (90 mLs Intravenous $Given 1/16/25 0336)         Impression:    ICD-10-CM    1. COVID-19  U07.1           Plan:    Pending studies include imaging  Plan is if patient's imaging was reassuring, plan to admit patient to medicine for frequent falls, likely secondary to COVID  Imaging was reassuring so admitted patient to medicine service for frequent falls, likely secondary to COVID..        MD Norman Carrero Collin, MD  01/16/25 0599

## 2025-01-16 NOTE — CONSULTS
"[facility]    Stroke Consult Note    Reason for Consult:  BLE weakness L > R     Chief Complaint: Extremity Weakness       HPI  Isabell Matthews is a 64 year old woman with historyof HTN, HLD, CKD IIIb, T1DM c/b neuropathy, ischemic and hemorrhagic strokes (right basal ganglia ischemic infarct in 2018, left basal ganglia hemorrhagic stroke 2021, right centrum semiovale & right chuck 2023) with residual R facial droop and vascular dementia, seizures, and recurrent UTI who presented to the ED 1/16 overnight after sustaining two falls at home and concerns for worsening balance issues - favoring her left leg, much more significant over the past 3-4 days per daughter. Reportedly recovered from URI symptoms 5 days prior. Neurology consulted for BLE weakness L > R with question of stroke.     History obtained frm daughter over the phone:     Patient fell twice today with her walker, which is very unusual for her.     1 week ago, patient and daughter were sick - described as \"head cold\": no fever but with cough and rhinorrhea. However, both have felt improved for the past 5 days.     3-4 days ago, patient did not seem like she was feeling good. She was asking for more help in the bathroom, getting her diaper off and on.    She has not been \"great on her feet\" for the past couple of weeks. Has fallen 3-4 times in the past 1 week. Today, she was \"really bad on her feet\" and fell down. She seems to not want to put all her weight down on her right leg. Left leg also \"shaky\".    At 10:30 pm 1/15, she was slumped over - did not look normal and daughter felt the patient's walking was \"really bad\". Seems to have worse speech than normal (described as more slurred towards the end of words). Has worsening pocketing of food.     Per daughter, these are the following residual deficits after her stroke: the patient pockets food in her mouth, R facial droop.    At baseline, the patient " "requires 24/7 care from daughter for almost all ADL's and for all iADL's.   -Daughter manages medications, finances, hygiene tasks - patient requires significant cueing, and cooking. At baseline, patient is able to dress herself but for the past few week daughter has been managing as the patient hasn't been feeling as well. Usually walks short distances with a walker.   -Baseline has urinary and bowel incontinence (bowel incontinence newer for the past 6 months)    -She is typically oriented to self but not always to location    No concerns for missed medications.     CT/CTA head and neck imaging performed by ED provider revealed the following per radiology read:   IMPRESSION:   HEAD CT:  No acute intracranial process.     HEAD CTA:  1.  Extensive multifocal areas of luminal irregularity are slightly progressed when compared to 3/7/2024. The progression is most notable at the left A2 segment.  2.  No branch occlusion, aneurysm or AVM.     NECK CTA:  No flow limiting stenosis or dissection.    Impression  Isabell Matthews is a 64 year old woman with history of cardiovascular risk factors and recurrent UTI who presents to the ED 1/16 after sustaining two falls at home within the same day and daughter with concerns for worsening balance issues - favoring her left over right leg. Neurology consulted for \"BLE weakness L > R\" with question of stroke. On exam, the patient does not have any objective weakness and the only focal deficit noted is her baseline R nasolabial fold flattening; she does however wince to pain when I touch her L deltoid and L inguinal region. She did however have mild bilateral shoulder abduction weakness on my exam (below baseline per prior neurology notes), indicative of a possible underlying toxic / metabolic cause that could be responsible for generalized weakness.    As such, agree with ED performing toxic metabolic, infectious workup and rule out fractures that may cause pain to the patient's L " shoulder / hip. Due to no new focal deficits on exam, suspicion for new stroke is low.    Addendum: Patient tested positive for COVID-19 in the ED. This is likely responsible for the patient's intermittent weakness and malaise leading to more frequent falls. CT/CTA head and neck returned without acute intracranial pathology but did reveal interval worsening intracranial stenosis, most notably at LA2 segment. On brief review, patient's diabetes has not been well controlled, most recent Hgb A1c 11.2 on 9/2024, which could certainly contribute to worsening intracranial atherosclerosis.     #COVID-19 infection  -If need for admission, recommend medicine admit with PT/OT consults   -Continue PTA aspirin 81 mg daily and atorvastatin 40 mg daily for secondary stroke prevention  -Continue PTA keppra 500 mg BID for history of seizures   -Continued diabetes management and counseling with PCP v.s. primary team if admitted (Hgb A1c goal < 7)  -No further imaging required from neurology perspective at this time    Patient Follow-up    -Follow up with outpatient neurologist, Dr. Medina as scheduled 10/20/2025    Thank you for this consult. No further stroke evaluation is recommended, so we will sign off. Please contact us with any additional questions.    The patient to be formally staffed in AM with Stroke Staff Dr. Wilson.    Eveline Sanabria MD  Neurology Resident  _____________________________________________________    Clinically Significant Risk Factors Present on Admission                 # Drug Induced Platelet Defect: home medication list includes an antiplatelet medication   # Hypertension: Noted on problem list     # Dementia: noted on problem list           # Financial/Environmental Concerns:        # CKD, Stage 3b (GFR 30-44): Will monitor and treat as appropriate  - last Cr =  1.40 mg/dL (Ref range: 0.51 - 0.95 mg/dL)  - last GFR = 41 mL/min/1.73m2 (Ref range: >60 mL/min/1.73m2)     Past Medical History    Past Medical  History:   Diagnosis Date    Chronic pain     Coronary atherosclerosis 6/14/2016    Essential hypertension     Ex-cigarette smoker     quit 7/2018 over 35 years    Ex-cigarette smoker     Hyperlipidemia     Hypothyroid     Seizures (H)     Stroke (H)     Vascular dementia (H)      Medications   Home Meds  Prior to Admission medications    Medication Sig Start Date End Date Taking? Authorizing Provider   acetaminophen (TYLENOL) 325 MG tablet Take 3 tablets (975 mg) by mouth every 8 hours. 9/25/24   Dasha Riggs APRN CNP   amLODIPine (NORVASC) 5 MG tablet Take 1 tablet (5 mg) by mouth daily. 11/21/24   Ronnie Kellogg MD   aspirin (ASA) 81 MG chewable tablet Take 1 tablet (81 mg) by mouth daily 9/14/23   Nighat Menard MD   atorvastatin (LIPITOR) 40 MG tablet Take 1 tablet (40 mg) by mouth daily. 10/7/24   Bony Castaneda MD   blood glucose (NO BRAND SPECIFIED) test strip Use to test blood sugar 4 times daily or as directed. 12/31/24   Ronnie Kellogg MD   blood glucose (NO BRAND SPECIFIED) test strip Use to test blood sugar 8 times daily or as directed. 11/21/24   Ronnie Kellogg MD   blood glucose (TRUE METRIX BLOOD GLUCOSE TEST) test strip USE TO TEST BLOOD SUGAR 4 TO 5 TIMES DAILY OR AS DIRECTED 9/14/23   Nighat Menard MD   blood glucose monitoring (NO BRAND SPECIFIED) meter device kit Use to test blood sugar 4 times daily.  Please provide glucose meter that is covered by insurance. 4/14/23   Bony Castaneda MD   blood glucose monitoring (ONE TOUCH ULTRA MINI) meter device kit Use to test blood sugar 4 times daily or as directed. 12/31/24   Ronnie Kellogg MD   carvedilol (COREG) 12.5 MG tablet Take 1 tablet (12.5 mg) by mouth 2 times daily (with meals). 10/7/24   Bony Castaneda MD   cyanocobalamin (VITAMIN B-12) 1000 MCG tablet Take 1 tablet (1,000 mcg) by mouth daily 9/14/23   Nighat Menard MD   DULoxetine (CYMBALTA) 20 MG capsule Take 1 capsule (20  mg) by mouth daily. 10/7/24   Bony Castaneda MD   gabapentin (NEURONTIN) 100 MG capsule Take 1 capsule (100 mg) by mouth at bedtime. 10/7/24   Bony Castaneda MD   Glucagon (GVOKE HYPOPEN) 1 MG/0.2ML pen Inject the contents of 1 device under the skin into lower abdomen, outer thigh, or outer upper arm as needed for hypoglycemia. If no response after 15 minutes, additional 1 mg dose from a new device may be injected while waiting for emergency assistance. 10/7/24   Bony Castaneda MD   insulin aspart (NOVOLOG FLEXPEN) 100 UNIT/ML pen Inject 2 units with meals plus correction scale additional three times daily before meals  as follows  Pre-Meal  - 200 give additional 1 unit.  For Pre-Meal  -250 give 2 additional units.  For Pre-Meal -300 give 3 additional units.  For Pre-Meal -350 give 4 additional units.  For Pre-Meal -400 give 5 additional units Max 25 units daily 10/7/24   Bony Castaneda MD   insulin aspart (NOVOLOG PENFILL) 100 UNIT/ML cartridge Inject 2-4 Units Subcutaneous 4 times daily (with meals and nightly) diabetes 6/18/24   Jess Nair MD   insulin glargine (LANTUS PEN) 100 UNIT/ML pen Inject 19 Units subcutaneously at bedtime. 10/7/24   Bony Castaneda MD   insulin pen needle (31G X 8 MM) 31G X 8 MM miscellaneous Use 4 pen needles daily or as directed. 9/14/23   Nighat Menard MD   levETIRAcetam (KEPPRA) 500 MG tablet Take 1 tablet (500 mg) by mouth 2 times daily. 10/7/24   Bony Castaneda MD   Lidocaine (LIDOCARE) 4 % Patch Place 1 patch over 12 hours onto the skin every 24 hours. To prevent lidocaine toxicity, patient should be patch free for 12 hrs daily. 9/25/24   Dasha Riggs APRN CNP   loratadine (CLARITIN) 10 MG tablet Take 1 tablet (10 mg) by mouth daily. 10/7/24   Bony Castaneda MD   methocarbamol (ROBAXIN) 500 MG tablet Take 1 tablet (500 mg) by mouth daily as needed for muscle spasms. 11/21/24   Ofstedal,  "Ronnie ULU MD   SYNTHROID 75 MCG tablet Take 1 tablet (75 mcg) by mouth daily. 10/7/24   Bony Castaneda MD   zinc oxide (DESITIN) 20 % external ointment Apply topically as needed for dry skin or irritation 9/14/23   Nighat Menard MD       Scheduled Meds  Current Facility-Administered Medications   Medication Dose Route Frequency Provider Last Rate Last Admin    iopamidol (ISOVUE-370) solution 80 mL  80 mL Intravenous Once Odalys Youssef MD        sodium chloride (PF) 0.9% PF flush 90 mL  90 mL Intravenous Once Odalys Youssef MD           Infusion Meds  Current Facility-Administered Medications   Medication Dose Route Frequency Provider Last Rate Last Admin       Allergies   Allergies   Allergen Reactions    Seasonal Allergies           PHYSICAL EXAMINATION   Temp:  [97.7  F (36.5  C)] 97.7  F (36.5  C)  Pulse:  [85] 85  Resp:  [16] 16  BP: (164)/(94) 164/94  SpO2:  [99 %] 99 %    General Exam  General:  Patient laying in bed without acute distress   HEENT:  Normocephalic, atraumatic   Cardio:  Regular rate per vitals  Pulmonary:  No respiratory distress, breathing comfortably on room air   Abdomen:  Non-distended   Extremities: no significant edema    Skin:  Warm, dry, intact     Neuro Exam  Mental Status:  Awake and alert, oriented to self and location (\"hospital\") but not age or month. Paucity of speech but naming, repetition intact. Follows simple and complex cross body 2 step commands  Cranial Nerves:  Pupils equal, visual fields intact, EOMI with saccadic pursuit, facial sensation intact to light touch, mild R nasolabial fold flattening that disappears with activation, hearing not formally tested but intact to conversation, no dysarthria, tongue midline  Motor: no drift in all extremities, possible subtle pronation in L arm without any drift. 4/5 strength bilateral upper extremities with tenderness to L deltoid (patient feels like it's bruised), otherwise 5/5 strength elsewhere - although did " endorse pain and tenderness to L inguinal fold when performing L hip flexion testing     Reflexes:  3+ biceps, brachioradialis, patellars, 1+ achilles; bilateral upgoing toes. Absent Rodrigues's  Sensory:  Sensation intact to light touch bilaterally without extinction  Coordination:  No dysmetria with FNF or heel to shin   Station/Gait:  Deferred      Stroke Scales    NIHSS  1a. Level of Consciousness 0-->Alert, keenly responsive   1b. LOC Questions 2-->Answers neither question correctly   1c. LOC Commands 0-->Performs both tasks correctly   2.   Best Gaze 0-->Normal   3.   Visual 0-->No visual loss   4.   Facial Palsy 1-->Minor paralysis (flattened nasolabial fold, asymmetry on smiling)   5a. Motor Arm, Left 0-->No drift, limb holds 90 (or 45) degrees for full 10 secs   5b. Motor Arm, Right 0-->No drift, limb holds 90 (or 45) degrees for full 10 secs   6a. Motor Leg, Left 0-->No drift, leg holds 30 degree position for full 5 secs   6b. Motor Leg, right 0-->No drift, leg holds 30 degree position for full 5 secs   7.   Limb Ataxia 0-->Absent   8.   Sensory 0-->Normal, no sensory loss   9.   Best Language 0-->No aphasia, normal   10. Dysarthria 0-->Normal   11. Extinction and Inattention  0-->No abnormality   Total 3 (01/16/25 0200)       Imaging  I personally reviewed all imaging; relevant findings per HPI.    Labs Data   CBC  Recent Labs   Lab 01/16/25  0010   WBC 4.5   RBC 4.06   HGB 11.4*   HCT 35.7        Basic Metabolic Panel   Recent Labs   Lab 01/16/25  0010      POTASSIUM 4.0   CHLORIDE 107   CO2 25   BUN 37.5*   CR 1.40*   *   VERONICA 9.1     Liver Panel  Recent Labs   Lab 01/16/25  0010   PROTTOTAL 6.2*   ALBUMIN 3.6   BILITOTAL 0.2   ALKPHOS 131   AST 24   ALT 18     INR    Recent Labs   Lab Test 01/16/25  0010 09/23/24  1325 06/16/24  0824   INR 0.96 1.02 0.98           Stroke Consult Data Data   This was a non-emergent, non-telestroke consult.
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
## 2025-01-16 NOTE — PROGRESS NOTES
Bladder scan showed 344 mL. Pt voided. Straight cath after only obtained 20 mL of clear/yellow but with some residuals urine.

## 2025-01-17 LAB
ALBUMIN SERPL BCG-MCNC: 3.3 G/DL (ref 3.5–5.2)
ALP SERPL-CCNC: 128 U/L (ref 40–150)
ALT SERPL W P-5'-P-CCNC: 14 U/L (ref 0–50)
ANION GAP SERPL CALCULATED.3IONS-SCNC: 9 MMOL/L (ref 7–15)
AST SERPL W P-5'-P-CCNC: 21 U/L (ref 0–45)
BASE EXCESS BLDV CALC-SCNC: 0.7 MMOL/L (ref -3–3)
BILIRUB DIRECT SERPL-MCNC: <0.2 MG/DL (ref 0–0.3)
BILIRUB SERPL-MCNC: 0.2 MG/DL
BUN SERPL-MCNC: 33.1 MG/DL (ref 8–23)
CALCIUM SERPL-MCNC: 8.7 MG/DL (ref 8.8–10.4)
CHLORIDE SERPL-SCNC: 108 MMOL/L (ref 98–107)
CREAT SERPL-MCNC: 1.51 MG/DL (ref 0.51–0.95)
EGFRCR SERPLBLD CKD-EPI 2021: 38 ML/MIN/1.73M2
ERYTHROCYTE [DISTWIDTH] IN BLOOD BY AUTOMATED COUNT: 15.2 % (ref 10–15)
GLUCOSE BLDC GLUCOMTR-MCNC: 129 MG/DL (ref 70–99)
GLUCOSE BLDC GLUCOMTR-MCNC: 160 MG/DL (ref 70–99)
GLUCOSE BLDC GLUCOMTR-MCNC: 174 MG/DL (ref 70–99)
GLUCOSE BLDC GLUCOMTR-MCNC: 194 MG/DL (ref 70–99)
GLUCOSE BLDC GLUCOMTR-MCNC: 230 MG/DL (ref 70–99)
GLUCOSE BLDC GLUCOMTR-MCNC: 273 MG/DL (ref 70–99)
GLUCOSE BLDC GLUCOMTR-MCNC: 285 MG/DL (ref 70–99)
GLUCOSE BLDC GLUCOMTR-MCNC: 298 MG/DL (ref 70–99)
GLUCOSE SERPL-MCNC: 151 MG/DL (ref 70–99)
HCO3 BLDV-SCNC: 26 MMOL/L (ref 21–28)
HCO3 SERPL-SCNC: 24 MMOL/L (ref 22–29)
HCT VFR BLD AUTO: 32.2 % (ref 35–47)
HGB BLD-MCNC: 10.6 G/DL (ref 11.7–15.7)
HOLD SPECIMEN: NORMAL
HOLD SPECIMEN: NORMAL
MCH RBC QN AUTO: 28.3 PG (ref 26.5–33)
MCHC RBC AUTO-ENTMCNC: 32.9 G/DL (ref 31.5–36.5)
MCV RBC AUTO: 86 FL (ref 78–100)
O2/TOTAL GAS SETTING VFR VENT: 21 %
OXYHGB MFR BLDV: 79 % (ref 70–75)
PCO2 BLDV: 43 MM HG (ref 40–50)
PH BLDV: 7.39 [PH] (ref 7.32–7.43)
PLATELET # BLD AUTO: 162 10E3/UL (ref 150–450)
PO2 BLDV: 47 MM HG (ref 25–47)
POTASSIUM SERPL-SCNC: 4 MMOL/L (ref 3.4–5.3)
PROT SERPL-MCNC: 5.6 G/DL (ref 6.4–8.3)
RBC # BLD AUTO: 3.74 10E6/UL (ref 3.8–5.2)
SAO2 % BLDV: 81.2 % (ref 70–75)
SODIUM SERPL-SCNC: 141 MMOL/L (ref 135–145)
WBC # BLD AUTO: 4.9 10E3/UL (ref 4–11)

## 2025-01-17 PROCEDURE — 250N000012 HC RX MED GY IP 250 OP 636 PS 637

## 2025-01-17 PROCEDURE — 84155 ASSAY OF PROTEIN SERUM: CPT | Performed by: STUDENT IN AN ORGANIZED HEALTH CARE EDUCATION/TRAINING PROGRAM

## 2025-01-17 PROCEDURE — 250N000013 HC RX MED GY IP 250 OP 250 PS 637

## 2025-01-17 PROCEDURE — 36415 COLL VENOUS BLD VENIPUNCTURE: CPT | Performed by: STUDENT IN AN ORGANIZED HEALTH CARE EDUCATION/TRAINING PROGRAM

## 2025-01-17 PROCEDURE — 80048 BASIC METABOLIC PNL TOTAL CA: CPT | Performed by: STUDENT IN AN ORGANIZED HEALTH CARE EDUCATION/TRAINING PROGRAM

## 2025-01-17 PROCEDURE — 82247 BILIRUBIN TOTAL: CPT | Performed by: STUDENT IN AN ORGANIZED HEALTH CARE EDUCATION/TRAINING PROGRAM

## 2025-01-17 PROCEDURE — 82805 BLOOD GASES W/O2 SATURATION: CPT | Performed by: STUDENT IN AN ORGANIZED HEALTH CARE EDUCATION/TRAINING PROGRAM

## 2025-01-17 PROCEDURE — 99232 SBSQ HOSP IP/OBS MODERATE 35: CPT | Mod: GC | Performed by: STUDENT IN AN ORGANIZED HEALTH CARE EDUCATION/TRAINING PROGRAM

## 2025-01-17 PROCEDURE — 120N000002 HC R&B MED SURG/OB UMMC

## 2025-01-17 PROCEDURE — 250N000011 HC RX IP 250 OP 636

## 2025-01-17 PROCEDURE — 80053 COMPREHEN METABOLIC PANEL: CPT | Performed by: STUDENT IN AN ORGANIZED HEALTH CARE EDUCATION/TRAINING PROGRAM

## 2025-01-17 PROCEDURE — 82962 GLUCOSE BLOOD TEST: CPT

## 2025-01-17 PROCEDURE — 85027 COMPLETE CBC AUTOMATED: CPT | Performed by: STUDENT IN AN ORGANIZED HEALTH CARE EDUCATION/TRAINING PROGRAM

## 2025-01-17 RX ORDER — PEN NEEDLE, DIABETIC 32GX 5/32"
1 NEEDLE, DISPOSABLE MISCELLANEOUS DAILY
COMMUNITY
Start: 2024-09-25

## 2025-01-17 RX ORDER — ONDANSETRON 2 MG/ML
4 INJECTION INTRAMUSCULAR; INTRAVENOUS EVERY 6 HOURS PRN
Status: DISCONTINUED | OUTPATIENT
Start: 2025-01-17 | End: 2025-01-21 | Stop reason: HOSPADM

## 2025-01-17 RX ORDER — CARVEDILOL 25 MG/1
25 TABLET ORAL 2 TIMES DAILY WITH MEALS
Status: DISCONTINUED | OUTPATIENT
Start: 2025-01-17 | End: 2025-01-20

## 2025-01-17 RX ADMIN — LEVETIRACETAM 500 MG: 500 TABLET, FILM COATED ORAL at 19:19

## 2025-01-17 RX ADMIN — LEVETIRACETAM 500 MG: 500 TABLET, FILM COATED ORAL at 08:04

## 2025-01-17 RX ADMIN — ACETAMINOPHEN 975 MG: 325 TABLET, FILM COATED ORAL at 05:52

## 2025-01-17 RX ADMIN — NIRMATRELVIR AND RITONAVIR 2 TABLET: KIT at 08:05

## 2025-01-17 RX ADMIN — INSULIN GLARGINE 10 UNITS: 100 INJECTION, SOLUTION SUBCUTANEOUS at 22:43

## 2025-01-17 RX ADMIN — CARVEDILOL 12.5 MG: 12.5 TABLET, FILM COATED ORAL at 08:05

## 2025-01-17 RX ADMIN — LEVOTHYROXINE SODIUM 75 MCG: 75 TABLET ORAL at 08:05

## 2025-01-17 RX ADMIN — ASPIRIN 81 MG CHEWABLE TABLET 81 MG: 81 TABLET CHEWABLE at 08:04

## 2025-01-17 RX ADMIN — CYANOCOBALAMIN TAB 1000 MCG 1000 MCG: 1000 TAB at 08:04

## 2025-01-17 RX ADMIN — GABAPENTIN 100 MG: 100 CAPSULE ORAL at 22:43

## 2025-01-17 RX ADMIN — ANTACID TABLETS 1000 MG: 500 TABLET, CHEWABLE ORAL at 08:04

## 2025-01-17 RX ADMIN — AMLODIPINE BESYLATE 5 MG: 5 TABLET ORAL at 08:04

## 2025-01-17 RX ADMIN — ACETAMINOPHEN 975 MG: 325 TABLET, FILM COATED ORAL at 13:31

## 2025-01-17 RX ADMIN — ACETAMINOPHEN 975 MG: 325 TABLET, FILM COATED ORAL at 22:43

## 2025-01-17 RX ADMIN — DULOXETINE HYDROCHLORIDE 20 MG: 20 CAPSULE, DELAYED RELEASE ORAL at 08:04

## 2025-01-17 RX ADMIN — INSULIN GLARGINE 10 UNITS: 100 INJECTION, SOLUTION SUBCUTANEOUS at 01:07

## 2025-01-17 RX ADMIN — LIDOCAINE 4% 1 PATCH: 40 PATCH TOPICAL at 05:52

## 2025-01-17 RX ADMIN — CARVEDILOL 25 MG: 12.5 TABLET, FILM COATED ORAL at 19:11

## 2025-01-17 RX ADMIN — ENOXAPARIN SODIUM 40 MG: 40 INJECTION SUBCUTANEOUS at 05:55

## 2025-01-17 ASSESSMENT — ACTIVITIES OF DAILY LIVING (ADL)
ADLS_ACUITY_SCORE: 75
ADLS_ACUITY_SCORE: 73
ADLS_ACUITY_SCORE: 77
ADLS_ACUITY_SCORE: 73
ADLS_ACUITY_SCORE: 77
ADLS_ACUITY_SCORE: 77
ADLS_ACUITY_SCORE: 73
ADLS_ACUITY_SCORE: 77
ADLS_ACUITY_SCORE: 73
ADLS_ACUITY_SCORE: 73
ADLS_ACUITY_SCORE: 75
ADLS_ACUITY_SCORE: 77
ADLS_ACUITY_SCORE: 73
ADLS_ACUITY_SCORE: 75
ADLS_ACUITY_SCORE: 77
ADLS_ACUITY_SCORE: 73

## 2025-01-17 NOTE — PLAN OF CARE
Shift: 1728-6763    BP (!) 167/94   Pulse 92   Temp 98.6  F (37  C) (Oral)   Resp 18   SpO2 95%     Neuro: Alert. Disoriented to situation, place, and time. Slow speech.   Cardiac: BP 160s/60s.    Respiratory: Sating >90% on RA.  GI/: Incontinent of bladder. No BM  Diet/appetite: Tolerating reg diet. Eating well.  Activity:  Not oob.  Pain: Back pain managed with scheduled tylenol and lidocaine.  Skin: No new deficits noted.  LDA's:  L PIV    Updates- Head CT done. Rules out trauma/ bleeding due to fall last night.  Plan: Continue with POC. Notify primary team with changes.      Goal Outcome Evaluation:      Plan of Care Reviewed With: patient    Overall Patient Progress: no changeOverall Patient Progress: no change    Outcome Evaluation: Pt disoriented to place, time and situation. Intermittently follows commands.

## 2025-01-17 NOTE — PROGRESS NOTES
RN notified provider of patient fall, patient was placed in c-collar supine on floor. BG and vitals were checked, patient was alert, responsive and following commands. Provider to order head CT, will continue to monitor patient. [place] alarm placed under patient once back on stretcher.

## 2025-01-17 NOTE — PROGRESS NOTES
Admission: presented to ED 1/16/24 after sustaining two falls at home and concerns for worsening balance issues - favoring her left leg, much more significant over the past 3-4 days per daughter. Neurology consulted for BLE weakness L > R with question of stroke     Hx: HTN, HLD, CKD IIIb, T1DM c/b neuropathy, ischemic and hemorrhagic strokes (right basal ganglia ischemic infarct in 2018, left basal ganglia hemorrhagic stroke 2021, right centrum semiovale & right chuck 2023) with residual R facial droop and vascular dementia, seizures, and recurrent UTI     DNR/DNI. VSS on RA X- hypertensive. A&Ox4, speaks slowly & takes time to converse. Special precautions for COVID +.    Nacl 500ml bolus running at 83ml/hr     Pain/Nausea: denies  Cardiac: WDL  Respiratory: WDL  Mobility: Was not oob.   Diet: Regular. BG ACHS.   Labs: Reviewed  LDAs: L PIV  Skin/incisions: No new acute concerns  GI/: incontinent of bowel & bladder.     Call light within reach.

## 2025-01-17 NOTE — PROVIDER NOTIFICATION
Pt very difficult to arouse for neuro exam.  Blood glucose done of 298, per provider draw VBG, ordered stat for lab to draw. Per provider, she is cleared for c collar precautions.

## 2025-01-17 NOTE — PROGRESS NOTES
Brief Progress Note:    Around 2100 on 1/16/25 patient was found on floor of ED room by nursing. Went to bedside to evaluate patient, who states that she got up out of bed to interact with nursing outside of room. States that when she fell, she hit her left forehead on the floor. No pain with neck movement. There is no overlying contusion or bump noted on head. Cervical collar is currently in place. Will obtain CT head and cervical spine to rule out trauma or active bleed, although based on exam this seems like a low likelihood.    Constitutional: In no acute distress, lying on floor with cervical collar in place  Cardiac: RRR, breathing comfortably on room air  Neuro: 4/5 strength in b/l upper extremities. LE strength intact. CN 2-12 intact, no dysmetria noted. Awake, alert, oriented to self and location    Plan:  - CT head and cervical spine w/out contrast  - Cervical collar in place until imaging clears    Terry Medeiros MD  PGY-1

## 2025-01-17 NOTE — PROGRESS NOTES
Patient is alert, blood sugar 129, 174 and 194, assisted with breakfast, and refused lunch, team saw her and patient was very sleepy, [place] alarm under her in the bed. Has BLE weakness, follows command but does not say much.  Continue to monitor.

## 2025-01-17 NOTE — MEDICATION SCRIBE - ADMISSION MEDICATION HISTORY
Medication Scribe Admission Medication History    Admission medication history is complete. The information provided in this note is only as accurate as the sources available at the time of the update.    Information Source(s): Family member, Clinic records, Hospital records, and CareEverywhere/SureScripts via phone and N/A    Pertinent Information: Nurse was in the room and reported that patient has only been giving yes and no responses all day. Has not been very interactive with nursing staff, and has been sleepy.     Per recent office visit (11/21/24), medications on PTA are current.    The following medications are not listed on dispense report:  acetaminophen (TYLENOL) 325 MG tablet   blood glucose monitoring (NO BRAND SPECIFIED) meter device kit   insulin aspart (NOVOLOG PENFILL) 100 UNIT/ML cartridge   SYNTHROID 75 MCG tablet   zinc oxide (DESITIN) 20 % external ointment     There are duplicate orders for test strips indicating that she is to test 8 times daily and another order that indicates that she test 4 or 4-5 times daily and the glucose meter. Per note on 11/21/24, under assessment & plan it shows that she should be checking her blood sugar 8 times daily.     Daughter Vishal was contacted and she confirmed the medications that Isabell is currently taking.     Daughter denies that she is taking any other medications.     Changes made to PTA medication list:  Added:   PENTIPS 32G X 4 MM miscellaneous (last dispense 09/25/24)  Deleted:   Lidocaine (LIDOCARE) 4 % Patch   Insulin pen needle (31G X 8 MM) 31G X 8 MM  Changed: None    Allergies reviewed with patient and updates made in EHR: yes    Medication History Completed By: Sammi Cueva 1/17/2025 4:11 PM    PTA Med List   Medication Sig Note Last Dose/Taking    acetaminophen (TYLENOL) 325 MG tablet Take 3 tablets (975 mg) by mouth every 8 hours.  Unknown    amLODIPine (NORVASC) 5 MG tablet Take 1 tablet (5 mg) by mouth daily.  1/15/2025 Morning     aspirin (ASA) 81 MG chewable tablet Take 1 tablet (81 mg) by mouth daily  1/15/2025 Morning    atorvastatin (LIPITOR) 40 MG tablet Take 1 tablet (40 mg) by mouth daily. 1/17/2025: Needs refill 1/15/2025 Evening    blood glucose (NO BRAND SPECIFIED) test strip Use to test blood sugar 4 times daily or as directed.  Unknown    blood glucose (NO BRAND SPECIFIED) test strip Use to test blood sugar 8 times daily or as directed.  Unknown    blood glucose (TRUE METRIX BLOOD GLUCOSE TEST) test strip USE TO TEST BLOOD SUGAR 4 TO 5 TIMES DAILY OR AS DIRECTED  Unknown    blood glucose monitoring (NO BRAND SPECIFIED) meter device kit Use to test blood sugar 4 times daily.  Please provide glucose meter that is covered by insurance.  Unknown    blood glucose monitoring (ONE TOUCH ULTRA MINI) meter device kit Use to test blood sugar 4 times daily or as directed.  Unknown    carvedilol (COREG) 12.5 MG tablet Take 1 tablet (12.5 mg) by mouth 2 times daily (with meals).  1/15/2025 Morning    cyanocobalamin (VITAMIN B-12) 1000 MCG tablet Take 1 tablet (1,000 mcg) by mouth daily  Unknown    DULoxetine (CYMBALTA) 20 MG capsule Take 1 capsule (20 mg) by mouth daily. 1/17/2025: Needs refill 1/15/2025 Evening    gabapentin (NEURONTIN) 100 MG capsule Take 1 capsule (100 mg) by mouth at bedtime. 1/17/2025: Needs refill 1/15/2025 Evening    Glucagon (GVOKE HYPOPEN) 1 MG/0.2ML pen Inject the contents of 1 device under the skin into lower abdomen, outer thigh, or outer upper arm as needed for hypoglycemia. If no response after 15 minutes, additional 1 mg dose from a new device may be injected while waiting for emergency assistance.  Unknown    insulin aspart (NOVOLOG FLEXPEN) 100 UNIT/ML pen Inject 2 units with meals plus correction scale additional three times daily before meals  as follows  Pre-Meal  - 200 give additional 1 unit.  For Pre-Meal  -250 give 2 additional units.  For Pre-Meal -300 give 3 additional units.  For  Pre-Meal -350 give 4 additional units.  For Pre-Meal -400 give 5 additional units Max 25 units daily  1/15/2025 at  7:30 AM    insulin aspart (NOVOLOG PENFILL) 100 UNIT/ML cartridge Inject 2-4 Units Subcutaneous 4 times daily (with meals and nightly) diabetes  Unknown    insulin glargine (LANTUS PEN) 100 UNIT/ML pen Inject 19 Units subcutaneously at bedtime.  1/15/2025 Evening    levETIRAcetam (KEPPRA) 500 MG tablet Take 1 tablet (500 mg) by mouth 2 times daily.  1/15/2025 Morning    loratadine (CLARITIN) 10 MG tablet Take 1 tablet (10 mg) by mouth daily.  1/15/2025 Evening    methocarbamol (ROBAXIN) 500 MG tablet Take 1 tablet (500 mg) by mouth daily as needed for muscle spasms.  Unknown    PENTIPS 32G X 4 MM miscellaneous Inject 1 each subcutaneously daily. Use 4 pen needles daily or as directed.  1/15/2025 Morning    SYNTHROID 75 MCG tablet Take 1 tablet (75 mcg) by mouth daily.  1/15/2025 Morning    zinc oxide (DESITIN) 20 % external ointment Apply topically as needed for dry skin or irritation  Unknown

## 2025-01-18 ENCOUNTER — APPOINTMENT (OUTPATIENT)
Dept: PHYSICAL THERAPY | Facility: CLINIC | Age: 65
DRG: 178 | End: 2025-01-18
Payer: COMMERCIAL

## 2025-01-18 DIAGNOSIS — M79.7 FIBROMYALGIA: ICD-10-CM

## 2025-01-18 LAB
GLUCOSE BLDC GLUCOMTR-MCNC: 181 MG/DL (ref 70–99)
GLUCOSE BLDC GLUCOMTR-MCNC: 214 MG/DL (ref 70–99)
GLUCOSE BLDC GLUCOMTR-MCNC: 221 MG/DL (ref 70–99)
GLUCOSE BLDC GLUCOMTR-MCNC: 228 MG/DL (ref 70–99)
GLUCOSE BLDC GLUCOMTR-MCNC: 263 MG/DL (ref 70–99)

## 2025-01-18 PROCEDURE — 250N000011 HC RX IP 250 OP 636

## 2025-01-18 PROCEDURE — 250N000013 HC RX MED GY IP 250 OP 250 PS 637

## 2025-01-18 PROCEDURE — 250N000012 HC RX MED GY IP 250 OP 636 PS 637

## 2025-01-18 PROCEDURE — 97116 GAIT TRAINING THERAPY: CPT | Mod: GP

## 2025-01-18 PROCEDURE — 99233 SBSQ HOSP IP/OBS HIGH 50: CPT | Performed by: STUDENT IN AN ORGANIZED HEALTH CARE EDUCATION/TRAINING PROGRAM

## 2025-01-18 PROCEDURE — 250N000012 HC RX MED GY IP 250 OP 636 PS 637: Performed by: STUDENT IN AN ORGANIZED HEALTH CARE EDUCATION/TRAINING PROGRAM

## 2025-01-18 PROCEDURE — 97530 THERAPEUTIC ACTIVITIES: CPT | Mod: GP

## 2025-01-18 PROCEDURE — 120N000002 HC R&B MED SURG/OB UMMC

## 2025-01-18 RX ADMIN — LEVOTHYROXINE SODIUM 75 MCG: 75 TABLET ORAL at 09:24

## 2025-01-18 RX ADMIN — ACETAMINOPHEN 975 MG: 325 TABLET, FILM COATED ORAL at 13:35

## 2025-01-18 RX ADMIN — LEVETIRACETAM 500 MG: 500 TABLET, FILM COATED ORAL at 09:24

## 2025-01-18 RX ADMIN — CYANOCOBALAMIN TAB 1000 MCG 1000 MCG: 1000 TAB at 09:24

## 2025-01-18 RX ADMIN — INSULIN ASPART 1 UNITS: 100 INJECTION, SOLUTION INTRAVENOUS; SUBCUTANEOUS at 17:11

## 2025-01-18 RX ADMIN — CARVEDILOL 25 MG: 12.5 TABLET, FILM COATED ORAL at 09:24

## 2025-01-18 RX ADMIN — CARVEDILOL 25 MG: 12.5 TABLET, FILM COATED ORAL at 17:11

## 2025-01-18 RX ADMIN — INSULIN ASPART 2 UNITS: 100 INJECTION, SOLUTION INTRAVENOUS; SUBCUTANEOUS at 14:31

## 2025-01-18 RX ADMIN — ENOXAPARIN SODIUM 40 MG: 40 INJECTION SUBCUTANEOUS at 06:15

## 2025-01-18 RX ADMIN — ASPIRIN 81 MG CHEWABLE TABLET 81 MG: 81 TABLET CHEWABLE at 09:24

## 2025-01-18 RX ADMIN — INSULIN ASPART 2 UNITS: 100 INJECTION, SOLUTION INTRAVENOUS; SUBCUTANEOUS at 06:23

## 2025-01-18 RX ADMIN — LEVETIRACETAM 500 MG: 500 TABLET, FILM COATED ORAL at 21:23

## 2025-01-18 RX ADMIN — GABAPENTIN 100 MG: 100 CAPSULE ORAL at 21:23

## 2025-01-18 RX ADMIN — INSULIN GLARGINE 15 UNITS: 100 INJECTION, SOLUTION SUBCUTANEOUS at 22:09

## 2025-01-18 RX ADMIN — ACETAMINOPHEN 975 MG: 325 TABLET, FILM COATED ORAL at 21:23

## 2025-01-18 RX ADMIN — DULOXETINE HYDROCHLORIDE 20 MG: 20 CAPSULE, DELAYED RELEASE ORAL at 09:24

## 2025-01-18 RX ADMIN — ACETAMINOPHEN 975 MG: 325 TABLET, FILM COATED ORAL at 06:14

## 2025-01-18 RX ADMIN — LIDOCAINE 4% 1 PATCH: 40 PATCH TOPICAL at 06:15

## 2025-01-18 ASSESSMENT — ACTIVITIES OF DAILY LIVING (ADL)
ADLS_ACUITY_SCORE: 76

## 2025-01-18 NOTE — PROGRESS NOTES
[facility] Report called to DELONTE Timmons. Transport set up approx 2000. Fatoumata Leal called to update.    Paged provider regarding pt's lethargy. Neuro exam is difficult. They were at bedside to assess earlier. Per providers, this is encephalopathy related to covid infection. She is intermittently more arousable. Per providers, VBG results are good. No concerns with transferring her to [facility].     Continue supportive care and plan of care.

## 2025-01-18 NOTE — PROGRESS NOTES
[facility]    Progress Note - Quyen 4       Date of Admission:  1/15/2025    Assessment & Plan   Isabell Matthews is a 64 year old woman with historyof HTN, HLD, CKD IIIb, T1DM c/b neuropathy, ischemic and hemorrhagic strokes (right basal ganglia ischemic infarct in 2018, left basal ganglia hemorrhagic stroke 2021, right centrum semiovale & right chuck 2023) with residual R facial droop and vascular dementia, seizures, and recurrent UTI admitted with weakness in the setting of covid infection, admitted for rehab with plans to discharge home when weakness improves and deemed to be safe.     Changes today:   - Stop paxlovid  - Increase coreg to 25mg BID for better blood pressure control  - HOLD amlodipine and statin in the setting of medication interactions with paxlovid, needs 3 day period after last paxlovid prior to resuming, per pharmacy. Daughter reports pt has not been taking amlodipine at home for the last few days.  - PT/OT  - [place] [facility] [place]     Generalized weakness  Dementia  Covid 19  Presenting with weakness and falls - at baseline highly dependent on daughter for cares. Initial concern for stroke - CTH/CTA reassuring, neurology also evaluated w/o concerns for stroke. Labs unremarkable other than tested positive for covid (had symptoms about a week ago) which could certainly explain symptoms particularly given poor baseline.  Denies chest pain, EKG without change (compared to previous) and trop neg. Low c/f other infectious process at this time - denies symptoms and CT cap w/o acute findings. Received 3 doses of paxlovid prior to discontinuation in the setting of patient stability and interactions with home medications. Intermittently sleepy, VBG wnl and glucose wnl will wake up and answer questions appropriately.   - Stop paxlovid   -no indication for steroids at this time  -DVT ppx with lovenox  -PT/OT  -fall precautions    Falls  R/o falls  sequelae  CTC spine, xrays of left arm/shoulder, pelvis/hip without acute abnormality. R shoulder without fracture. 1/16 overnight had a fall, unwitnessed, CT head and CT neck without fracture or bleeding.   - PT with recs for TCU, daughter and patient feel strongly about rehab IP followed by OP therapy with discharge home as they have not had positive experiences with TCUs in the past.        H/o ischemic and hemorrhagic strokes (multiple)  Seizures  Hypertension  Hyperlipidemia  No acute concerns, see above. CTA with extensive multifocal areas of luminal irregularity, slightly progressed when compared to 3/7/2024, has neurology follow up as outpatient.   -HOLD home amlodipine secondary to interactions with paxlovid. Needs three days prior to resumption   - Increase carvedilol to 25mg BID while holding amlodipine   -c/w home aspirin  -c/w  home keppra BID  -holding home statin iso paxlovid     Type 1 diabetes mellitus, poorly controlled  A1c 9.6%.   -glargine 10 units  -medium dose correction, hypoglycemia protocol  -c/w home gabapentin for neuropathy     Hypothyroidism  -c/w home levothyroxine     H/o recurrent UTI  Low c/f for UTI at this time.  -monitor     Chronic kidney disease stage III  Renal function at baseline w/o electrolyte derangements     Depression  -c/w home duloxetine     Indeterminant hypodense focus in the left renal upper pole  -follow up as outpatient        Diet:  regular diet  DVT Prophylaxis: Enoxaparin (Lovenox) SQ  Parker Catheter: Not present  Fluids: none  Lines: None     Cardiac Monitoring: None  Code Status:  DNR/DNI     Clinically Significant Risk Factors Present on Admission                  # Drug Induced Platelet Defect: home medication list includes an antiplatelet medication   # Hypertension: Noted on problem list     # Dementia: noted on problem list            # Financial/Environmental Concerns:            Diet: Regular Diet Adult    DVT Prophylaxis: Enoxaparin (Lovenox)  SQ  Parker Catheter: Not present  Fluids: None   Lines: None     Cardiac Monitoring: None  Code Status: No CPR- Do NOT Intubate      Clinically Significant Risk Factors          # Hyperchloremia: Highest Cl = 108 mmol/L in last 2 days, will monitor as appropriate          # Hypoalbuminemia: Lowest albumin = 3.3 g/dL at 1/17/2025  7:18 AM, will monitor as appropriate     # Hypertension: Noted on problem list     # Dementia: noted on problem list            # Financial/Environmental Concerns:           Social Drivers of Health   Housing Stability: Unknown (8/25/2024)    Housing Stability     Do you have housing? : Patient unable to answer     Are you worried about losing your housing?: Patient unable to answer   Tobacco Use: Medium Risk (11/21/2024)    Patient History     Smoking Tobacco Use: Former     Smokeless Tobacco Use: Never   Financial Resource Strain: Unknown (8/25/2024)    Financial Resource Strain     Within the past 12 months, have you or your family members you live with been unable to get utilities (heat, electricity) when it was really needed?: Patient unable to answer   Transportation Needs: Unknown (8/25/2024)    Transportation Needs     Within the past 12 months, has lack of transportation kept you from medical appointments, getting your medicines, non-medical meetings or appointments, work, or from getting things that you need?: Patient unable to answer         Disposition Plan     Medically Ready for Discharge: Anticipated in 2-4 Days         The patient's care was discussed with the Attending Physician, Dr. Riojas .    Jean-Paul Medel MD  Hospitalist Service, [facility]  Securely message with [employer] (more info)  Text page via [employer] Paging/Directory   See signed in provider for up to date coverage information  ______________________________________________________________________    Interval History   Fall overnight, unwitnessed, CT head and  neck without concerns. Daughter updated x 3, including once transferred to [facility] [place].     Physical Exam   Vital Signs: Temp: 97.8  F (36.6  C) Temp src: Axillary BP: 132/69 Pulse: 73   Resp: 18 SpO2: 94 % O2 Device: None (Room air)    Weight: 0 lbs 0 oz    GENERAL: Sleepy. No acute distress. Difficult to arouse, does respond to tickling of the feet, later in day awake and answering questions   HEENT: EOMI. Anicteric. Moist mucous membranes.   LUNGS: Clear to auscultation bilaterally. Normal respiratory effort with no accessory muscle use.  CARDIOVASCULAR: Regular rate and rhythm. No murmur.  ABDOMEN: Soft, non-tender and non-distended. No palpable masses.  EXTREMITIES: No edema. Moving all extremities.   SKIN: No rashes or lesions.   NEUROLOGIC: No focal neurological deficits. CN II-XII grossly intact, but not individually tested.  PSYCHIATRIC: Sleepy     Medical Decision Making       Please see A&P for additional details of medical decision making.      Data     I have personally reviewed the following data over the past 24 hrs:    4.9  \   10.6 (L)   / 162     141 108 (H) 33.1 (H) /  285 (H)   4.0 24 1.51 (H) \     ALT: 14 AST: 21 AP: 128 TBILI: 0.2   ALB: 3.3 (L) TOT PROTEIN: 5.6 (L) LIPASE: N/A       Imaging results reviewed over the past 24 hrs:   Recent Results (from the past 24 hours)   CT Head w/o Contrast    Narrative    EXAM: CT HEAD W/O CONTRAST, CT CERVICAL SPINE W/O CONTRAST  LOCATION: [facility]  DATE: 1/17/2025    INDICATION: Head and neck injury  COMPARISON: 1/16/2025  TECHNIQUE:   1) Routine CT Head without IV contrast. Multiplanar reformats. Dose reduction techniques were used.  2) Routine CT Cervical Spine without IV contrast. Multiplanar reformats. Dose reduction techniques were used.    FINDINGS:   HEAD CT:   INTRACRANIAL CONTENTS: No intracranial hemorrhage, extraaxial collection, or mass effect.  No CT evidence of acute infarct. Moderate presumed  chronic small vessel ischemic changes. Moderate generalized volume loss. No hydrocephalus. Small chronic   infarction right basal ganglia.     VISUALIZED ORBITS/SINUSES/MASTOIDS: No intraorbital abnormality. No paranasal sinus mucosal disease. No middle ear or mastoid effusion.    BONES/SOFT TISSUES: No acute abnormality.    CERVICAL SPINE CT:   VERTEBRA: Normal vertebral body heights. Slight anterior subluxation C7 on T1. No fracture or posttraumatic subluxation.     CANAL/FORAMINA: Mild degenerative changes without significant canal narrowing at any level. Multilevel mild-to-moderate neural foraminal narrowing.    PARASPINAL: No extraspinal abnormality. Visualized lung fields are clear.      Impression    IMPRESSION:  HEAD CT:  1.  No acute intracranial process.    CERVICAL SPINE CT:  1.  No definite CT evidence for acute fracture or post traumatic subluxation.   CT Cervical Spine w/o Contrast    Narrative    EXAM: CT HEAD W/O CONTRAST, CT CERVICAL SPINE W/O CONTRAST  LOCATION: [facility]  DATE: 1/17/2025    INDICATION: Head and neck injury  COMPARISON: 1/16/2025  TECHNIQUE:   1) Routine CT Head without IV contrast. Multiplanar reformats. Dose reduction techniques were used.  2) Routine CT Cervical Spine without IV contrast. Multiplanar reformats. Dose reduction techniques were used.    FINDINGS:   HEAD CT:   INTRACRANIAL CONTENTS: No intracranial hemorrhage, extraaxial collection, or mass effect.  No CT evidence of acute infarct. Moderate presumed chronic small vessel ischemic changes. Moderate generalized volume loss. No hydrocephalus. Small chronic   infarction right basal ganglia.     VISUALIZED ORBITS/SINUSES/MASTOIDS: No intraorbital abnormality. No paranasal sinus mucosal disease. No middle ear or mastoid effusion.    BONES/SOFT TISSUES: No acute abnormality.    CERVICAL SPINE CT:   VERTEBRA: Normal vertebral body heights. Slight anterior subluxation C7 on T1.  No fracture or posttraumatic subluxation.     CANAL/FORAMINA: Mild degenerative changes without significant canal narrowing at any level. Multilevel mild-to-moderate neural foraminal narrowing.    PARASPINAL: No extraspinal abnormality. Visualized lung fields are clear.      Impression    IMPRESSION:  HEAD CT:  1.  No acute intracranial process.    CERVICAL SPINE CT:  1.  No definite CT evidence for acute fracture or post traumatic subluxation.

## 2025-01-18 NOTE — PLAN OF CARE
[id number]    Goal Outcome Evaluation:      Plan of Care Reviewed With: patient    Overall Patient Progress: improvingOverall Patient Progress: improving    Patient is alert to self, disoriented to date and time. Able to follow commands. Slow to responds and speaks only few words when asked. Not OOB during the shift. Able to reposition self independently. Incontinent of bowel and bladder. No BM this shift, bowel sounds active. LBM: 01/17.    RA. Regular diet, needs supervision when eating, watch out for pocketing. PIV on L, SL. C/o mild discomfort on LLE managed with scheduled Tylenol. Lidocaine patch applied on R shoulder blades. Denies SOB, chest pain and n/v. Baseline neuropathy. Mepilex on sacrum for protection. Residual R facial droop, no upper extremity drifting.     Admission question unable to complete due to patient's orientation. Bed alarm on for safety. Special precaution maintained. Continue with POC.     BP (!) 146/77 (BP Location: Right arm, Patient Position: Semi-Cantu's, Cuff Size: Adult Regular)   Pulse 74   Temp 98  F (36.7  C) (Oral)   Resp 19   SpO2 93%     Cess CAPRI Bess, RN on 1/18/2025 at 6:52 AM

## 2025-01-18 NOTE — PROGRESS NOTES
Pt sent with transport to [facility]. Belongings and medications sent with EMS. VSS, no change in neuro exam.

## 2025-01-18 NOTE — PROGRESS NOTES
[facility]    Medicine Progress Note - Hospitalist Service, GOLD TEAM 22    Date of Admission:  1/15/2025    Assessment & Plan   Isabell Matthews is a 64 year old woman with history of HTN, HLD, CKD IIIb, T1DM c/b neuropathy, ischemic and hemorrhagic strokes (right basal ganglia ischemic infarct in 2018, left basal ganglia hemorrhagic stroke 2021, right centrum semiovale & right chuck 2023) with residual R facial droop and vascular dementia, seizures, and recurrent UTI admitted with weakness in the setting of covid infection.  Physical therapy recommends discharge to TCU.    Today:  - Continue physical therapy and Occupational Therapy       Generalized weakness  Dementia  Covid 19  Presenting with weakness and falls - at baseline highly dependent on daughter for cares.  Stroke (CT head/CT angio, neurology evaluation), cardiac (EKG without change, negative troponin) workup negative.  CT abdomen did not show acute pathology.  Labs overall reassuring except for positive COVID: 01/16-elevated troponin, trended down, UA with glucose, negative for infection, flu, RSV negative, normal Keppra level, A1c 9.6: Improved from 11.23 months ago, TSH normal, lactate normal, normal INR normal LFTs, no leukocytosis on CBC.  She had COVID symptoms about a week ago which could certainly explain symptoms particularly given poor baseline.  She received 3 doses of paxlovid (last dose 01/17) prior to discontinuation in the setting of patient stability and interactions with home medications. Intermittently sleepy, VBG wnl and glucose wnl will wake up and answer questions appropriately however slow to respond.   - no indication for steroids at this time, patient on room air  - DVT ppx with lovenox  - fall precautions     Falls  R/o falls sequelae  CTC spine, xrays of left arm/shoulder, pelvis/hip without acute abnormality. R shoulder without fracture. 1/16 overnight had a fall, unwitnessed, CT head  and CT neck without fracture or bleeding.   - PT with recs for TCU, daughter and patient feel strongly about rehab IP followed by OP therapy with discharge home as they have not had positive experiences with TCUs in the past.      H/o ischemic and hemorrhagic strokes (multiple)  Seizures  Hypertension  Hyperlipidemia  No acute concerns, see above. CTA with extensive multifocal areas of luminal irregularity, slightly progressed when compared to 3/7/2024, has neurology follow up as outpatient.   - HOLD home amlodipine secondary to interactions with paxlovid. Needs three days prior to resumption (resume 01/21)  -Continue increased carvedilol 25mg BID while holding amlodipine (was on 12.5 twice daily prior to admission).  Return to PTA dosing on 01/21  -Continue PTA aspirin  -Continue PTA keppra BID  -holding home statin iso paxlovid (resume 01/21)     Type 1 diabetes mellitus, poorly controlled  A1c 9.6% 01/17.  Was 11.2, 3 months ago  -glargine 10 units  -medium dose correction, hypoglycemia protocol  -Continue PTA gabapentin for neuropathy     Hypothyroidism  -Continue PTA levothyroxine     H/o recurrent UTI   UA 01/17 negative for infection.  -monitor     Chronic kidney disease stage III  Renal function at baseline w/o electrolyte derangements     Depression  -Continue PTA duloxetine     Indeterminant hypodense focus in the left renal upper pole  -follow up as outpatient     Anemia  - Hemoglobin 11.4 on admission.  10.6 on 01/17.  Baseline~12          Diet: Regular Diet Adult    DVT Prophylaxis: Enoxaparin (Lovenox) SQ  Parker Catheter: Not present  Lines: None     Cardiac Monitoring: None  Code Status: No CPR- Do NOT Intubate      Clinically Significant Risk Factors          # Hyperchloremia: Highest Cl = 108 mmol/L in last 2 days, will monitor as appropriate          # Hypoalbuminemia: Lowest albumin = 3.3 g/dL at 1/17/2025  7:18 AM, will monitor as appropriate     # Hypertension: Noted on problem list     #  Dementia: noted on problem list            # Financial/Environmental Concerns:           Social Drivers of Health    Housing Stability: Unknown (8/25/2024)    Housing Stability     Do you have housing? : Patient unable to answer     Are you worried about losing your housing?: Patient unable to answer   Tobacco Use: Medium Risk (11/21/2024)    Patient History     Smoking Tobacco Use: Former     Smokeless Tobacco Use: Never   Financial Resource Strain: Unknown (8/25/2024)    Financial Resource Strain     Within the past 12 months, have you or your family members you live with been unable to get utilities (heat, electricity) when it was really needed?: Patient unable to answer   Transportation Needs: Unknown (8/25/2024)    Transportation Needs     Within the past 12 months, has lack of transportation kept you from medical appointments, getting your medicines, non-medical meetings or appointments, work, or from getting things that you need?: Patient unable to answer          Disposition Plan     Medically Ready for Discharge: Ready Now             Barbara Georges MD  Hospitalist Service, GOLD TEAM [address] [place] [address]  Securely message with [phone] (more info)  Text page via [facility] Paging/Directory   See signed in provider for up to date coverage information  ______________________________________________________________________    Interval History   Transferred from the [facility].  Patient denies any new concerns.  No acute overnight events.  Denies chest pain, cough, shortness of breath, nausea, vomiting, abdominal pain.  Reports being hungry this morning and wanting to eat.    Physical Exam   Vital Signs: Temp: 98  F (36.7  C) Temp src: Oral BP: (!) 146/77 Pulse: 74   Resp: 19 SpO2: 93 % O2 Device: None (Room air)    Weight: 0 lbs 0 oz    General Appearance: Sleeping in the room, woke up, No acute distress, Nontoxic appearing.  Responding to questions however very slow to  answer  Respiratory: Breathing comfortably on room air. Lungs are clear to auscultation bilaterally.    Cardiovascular: Regular rate and rhythm, extremities perfused.   GI: Normal bowel sounds, Soft, nontender, nondistended  Extremities: No lower extremity edema.  Neurology:  moving all extremities appropriately oriented to self, not oriented to situation or time.      Medical Decision Making       60 MINUTES SPENT BY ME on the date of service doing chart review, history, exam, documentation & further activities per the note.      Data   ------------------------- PAST 24 HR DATA REVIEWED -----------------------------------------------

## 2025-01-19 LAB
CREAT SERPL-MCNC: 1.52 MG/DL (ref 0.51–0.95)
EGFRCR SERPLBLD CKD-EPI 2021: 37 ML/MIN/1.73M2
GLUCOSE BLDC GLUCOMTR-MCNC: 259 MG/DL (ref 70–99)
GLUCOSE BLDC GLUCOMTR-MCNC: 280 MG/DL (ref 70–99)
GLUCOSE BLDC GLUCOMTR-MCNC: 288 MG/DL (ref 70–99)
GLUCOSE BLDC GLUCOMTR-MCNC: 301 MG/DL (ref 70–99)
GLUCOSE BLDC GLUCOMTR-MCNC: 397 MG/DL (ref 70–99)
PLATELET # BLD AUTO: 140 10E3/UL (ref 150–450)

## 2025-01-19 PROCEDURE — 120N000002 HC R&B MED SURG/OB UMMC

## 2025-01-19 PROCEDURE — 99232 SBSQ HOSP IP/OBS MODERATE 35: CPT | Performed by: STUDENT IN AN ORGANIZED HEALTH CARE EDUCATION/TRAINING PROGRAM

## 2025-01-19 PROCEDURE — 36415 COLL VENOUS BLD VENIPUNCTURE: CPT | Performed by: STUDENT IN AN ORGANIZED HEALTH CARE EDUCATION/TRAINING PROGRAM

## 2025-01-19 PROCEDURE — 250N000013 HC RX MED GY IP 250 OP 250 PS 637

## 2025-01-19 PROCEDURE — 250N000011 HC RX IP 250 OP 636

## 2025-01-19 PROCEDURE — 82565 ASSAY OF CREATININE: CPT | Performed by: STUDENT IN AN ORGANIZED HEALTH CARE EDUCATION/TRAINING PROGRAM

## 2025-01-19 PROCEDURE — 85049 AUTOMATED PLATELET COUNT: CPT | Performed by: STUDENT IN AN ORGANIZED HEALTH CARE EDUCATION/TRAINING PROGRAM

## 2025-01-19 RX ADMIN — ENOXAPARIN SODIUM 40 MG: 40 INJECTION SUBCUTANEOUS at 06:31

## 2025-01-19 RX ADMIN — INSULIN ASPART 3 UNITS: 100 INJECTION, SOLUTION INTRAVENOUS; SUBCUTANEOUS at 06:31

## 2025-01-19 RX ADMIN — ACETAMINOPHEN 975 MG: 325 TABLET, FILM COATED ORAL at 21:40

## 2025-01-19 RX ADMIN — LEVETIRACETAM 500 MG: 500 TABLET, FILM COATED ORAL at 08:25

## 2025-01-19 RX ADMIN — INSULIN ASPART 3 UNITS: 100 INJECTION, SOLUTION INTRAVENOUS; SUBCUTANEOUS at 12:57

## 2025-01-19 RX ADMIN — ACETAMINOPHEN 975 MG: 325 TABLET, FILM COATED ORAL at 06:31

## 2025-01-19 RX ADMIN — ASPIRIN 81 MG CHEWABLE TABLET 81 MG: 81 TABLET CHEWABLE at 08:25

## 2025-01-19 RX ADMIN — DULOXETINE HYDROCHLORIDE 20 MG: 20 CAPSULE, DELAYED RELEASE ORAL at 08:25

## 2025-01-19 RX ADMIN — LIDOCAINE 4% 1 PATCH: 40 PATCH TOPICAL at 06:31

## 2025-01-19 RX ADMIN — CYANOCOBALAMIN TAB 1000 MCG 1000 MCG: 1000 TAB at 08:25

## 2025-01-19 RX ADMIN — CARVEDILOL 25 MG: 12.5 TABLET, FILM COATED ORAL at 08:25

## 2025-01-19 RX ADMIN — LEVOTHYROXINE SODIUM 75 MCG: 75 TABLET ORAL at 08:25

## 2025-01-19 RX ADMIN — GABAPENTIN 100 MG: 100 CAPSULE ORAL at 21:40

## 2025-01-19 RX ADMIN — CARVEDILOL 25 MG: 12.5 TABLET, FILM COATED ORAL at 17:44

## 2025-01-19 RX ADMIN — LEVETIRACETAM 500 MG: 500 TABLET, FILM COATED ORAL at 21:40

## 2025-01-19 RX ADMIN — INSULIN ASPART 4 UNITS: 100 INJECTION, SOLUTION INTRAVENOUS; SUBCUTANEOUS at 17:44

## 2025-01-19 ASSESSMENT — ACTIVITIES OF DAILY LIVING (ADL)
ADLS_ACUITY_SCORE: 73
ADLS_ACUITY_SCORE: 73
ADLS_ACUITY_SCORE: 77
ADLS_ACUITY_SCORE: 76
ADLS_ACUITY_SCORE: 73
ADLS_ACUITY_SCORE: 77
ADLS_ACUITY_SCORE: 77
ADLS_ACUITY_SCORE: 73
ADLS_ACUITY_SCORE: 73
ADLS_ACUITY_SCORE: 76
ADLS_ACUITY_SCORE: 73

## 2025-01-19 NOTE — PLAN OF CARE
[id number]    Goal Outcome Evaluation:      Plan of Care Reviewed With: patient    Overall Patient Progress: improvingOverall Patient Progress: improving    Patient is alert to self. Able to follow commands. Slow to responds and speaks only few words when asked. Able to reposition self independently. Incontinent of bowel and bladder. LBM: 01/18.    RA. Regular diet, takes pill with vanilla pudding/apple sauce. PIV on L, SL. Denies pain, SOB, chest pain and n/v. Baseline neuropathy. Residual facial droop.     Special precaution maintained. Continue with POC.     BP (!) 164/65   Pulse 78   Temp 97.7  F (36.5  C) (Oral)   Resp 18   SpO2 96%     Cess CAPRI Bess, RN on 1/19/2025 at 7:09 AM

## 2025-01-19 NOTE — PROGRESS NOTES
[facility]    Medicine Progress Note - Hospitalist Service, GOLD TEAM 22    Date of Admission:  1/15/2025    Assessment & Plan   Isabell Matthews is a 64 year old woman with history of HTN, HLD, CKD IIIb, T1DM c/b neuropathy, ischemic and hemorrhagic strokes (right basal ganglia ischemic infarct in 2018, left basal ganglia hemorrhagic stroke 2021, right centrum semiovale & right chuck 2023) with residual R facial droop and vascular dementia, seizures, and recurrent UTI admitted with weakness in the setting of covid infection.  Physical therapy recommends discharge to TCU vs. home.    Today:  - Increase glargine to 20U at bedtime         Generalized weakness  Dementia  Covid 19  Presenting with weakness and falls - at baseline highly dependent on daughter for cares.  Stroke (CT head/CT angio, neurology evaluation), cardiac (EKG without change, negative troponin) workup negative.  CT abdomen did not show acute pathology.  Labs overall reassuring except for positive COVID: 01/16-elevated troponin, trended down, UA with glucose, negative for infection, flu, RSV negative, normal Keppra level, A1c 9.6: Improved from 11.23 months ago, TSH normal, lactate normal, normal INR normal LFTs, no leukocytosis on CBC.  She had COVID symptoms about a week ago which could certainly explain symptoms particularly given poor baseline.  She received 3 doses of paxlovid (last dose 01/17) prior to discontinuation in the setting of patient stability and interactions with home medications. Intermittently sleepy, VBG wnl and glucose wnl will wake up and answer questions appropriately however slow to respond.   - no indication for steroids at this time, patient on room air  - DVT ppx with lovenox  - fall precautions     Falls  R/o falls sequelae  CTC spine, xrays of left arm/shoulder, pelvis/hip without acute abnormality. R shoulder without fracture. 1/16 overnight had a fall, unwitnessed, CT head and  CT neck without fracture or bleeding.   - PT with recs for TCU, daughter and patient feel strongly about rehab IP followed by OP therapy with discharge home as they have not had positive experiences with TCUs in the past.      H/o ischemic and hemorrhagic strokes (multiple)  Seizures  Hypertension  Hyperlipidemia  No acute concerns, see above. CTA with extensive multifocal areas of luminal irregularity, slightly progressed when compared to 3/7/2024, has neurology follow up as outpatient.   - HOLD home amlodipine secondary to interactions with paxlovid. Needs three days prior to resumption (resume 01/21)  -Continue increased carvedilol 25mg BID while holding amlodipine (was on 12.5 twice daily prior to admission).  Return to PTA dosing on 01/21 currently hypertensive however Coreg was recently increased, monitor for now.  -Continue PTA aspirin  -Continue PTA keppra BID  -holding home statin iso paxlovid (resume 01/21)     Type 1 diabetes mellitus, poorly controlled  A1c 9.6% 01/17.  Was 11.2, 3 months ago  -glargine 20 units  -medium dose correction, hypoglycemia protocol  -Continue PTA gabapentin for neuropathy     Hypothyroidism  -Continue PTA levothyroxine     H/o recurrent UTI   UA 01/17 negative for infection.  No acute inpatient concerns.     Chronic kidney disease stage III  Renal function at baseline w/o electrolyte derangements     Depression  -Continue PTA duloxetine     Indeterminant hypodense focus in the left renal upper pole  -follow up as outpatient     Anemia  - Hemoglobin 11.4 on admission.  10.6 on 01/17.  Baseline~12          Diet: Regular Diet Adult    DVT Prophylaxis: Enoxaparin (Lovenox) SQ  Parker Catheter: Not present  Lines: None     Cardiac Monitoring: None  Code Status: No CPR- Do NOT Intubate      Clinically Significant Risk Factors          # Hyperchloremia: Highest Cl = 108 mmol/L in last 2 days, will monitor as appropriate          # Hypoalbuminemia: Lowest albumin = 3.3 g/dL at  1/17/2025  7:18 AM, will monitor as appropriate     # Hypertension: Noted on problem list     # Dementia: noted on problem list            # Financial/Environmental Concerns:           Social Drivers of Health    Housing Stability: Unknown (8/25/2024)    Housing Stability     Do you have housing? : Patient unable to answer     Are you worried about losing your housing?: Patient unable to answer   Tobacco Use: Medium Risk (11/21/2024)    Patient History     Smoking Tobacco Use: Former     Smokeless Tobacco Use: Never   Financial Resource Strain: Unknown (8/25/2024)    Financial Resource Strain     Within the past 12 months, have you or your family members you live with been unable to get utilities (heat, electricity) when it was really needed?: Patient unable to answer   Transportation Needs: Unknown (8/25/2024)    Transportation Needs     Within the past 12 months, has lack of transportation kept you from medical appointments, getting your medicines, non-medical meetings or appointments, work, or from getting things that you need?: Patient unable to answer          Disposition Plan     Medically Ready for Discharge: Ready Now             Barbara Georges MD  Hospitalist Service, GOLD TEAM [address] [place] [address]  Securely message with Vocera (more info)  Text page via [facility] Paging/Directory   See signed in provider for up to date coverage information  ______________________________________________________________________    Interval History   Notes reviewed.  No acute overnight events.  Patient denies shortness of breath.  No chest pain.  No abdominal pain.  No nausea or vomiting.  Eating and drinking okay.       Physical Exam   Vital Signs: Temp: 97.7  F (36.5  C) Temp src: Oral BP: (!) 164/65 Pulse: 78   Resp: 18 SpO2: 96 % O2 Device: None (Room air)    Weight: 0 lbs 0 oz    General Appearance:  Sleeping in the room, woke up, No acute distress, Nontoxic appearing.  Responding  to questions appropriately however very slow to answer  Respiratory: Breathing comfortably on room air. Lungs are clear to auscultation bilaterally.    Cardiovascular: Regular rate and rhythm, extremities perfused.   GI: Normal bowel sounds, Soft, nontender, nondistended  Extremities: No lower extremity edema.  Neurology:  moving all extremities appropriately oriented to self, not oriented to situation or time.    Medical Decision Making       45 MINUTES SPENT BY ME on the date of service doing chart review, history, exam, documentation & further activities per the note.      Data   ------------------------- PAST 24 HR DATA REVIEWED -----------------------------------------------

## 2025-01-19 NOTE — PROGRESS NOTES
Assumed patient care around [id number].      Patient alert self. Patient denies SOB, chest pain,  pain, and nausea. No nonverbal indicators of pain. Incontinent care provided. Repositioning Q2hrs. Patient resting comfortably in bed. Bed alarm on for safety.      Continue with plan of care.

## 2025-01-19 NOTE — PROGRESS NOTES
0700-1930    Goal outcome  Evaluation  0700-1900    VS: BP (!) 153/95 (BP Location: Right arm)   Pulse 71   Temp 96.8  F (36  C) (Oral)   Resp 18   SpO2 95%    O2: Stable on on RA Denies SOB   Output: incontinent of bowel and bladder, voids spontaneously w/o difficulty   Last BM: 1/18   Activity: Assist of 2   Skin: X Mepilex to the bottom   Pain: Manage with Prn meds              CMS: A&O X4 denies chest pain, n/v, numbness/tingling, and dizziness   Dressing: none   Diet: Regular needs supervision during meals   LDA: LPIV SL   Equipment: Personal belongings   Plan: Call light in reach, continue POC   Additional Info:

## 2025-01-19 NOTE — PROGRESS NOTES
[id number]    D: Patient admitted from ED-EB via stretcher for COVID.     I: Upon arrival to the unit patient was oriented to room, unit, and call light. Patient s height, weight, and vital signs were obtained. Allergies reviewed and allergy band applied. Provider notified of patient s arrival on the unit. Adult AVS completed. Head to toe assessment completed. Education assessment completed. Care plan initiated.    A: Vital signs stable upon admission. No reports of pain. Two RN skin assessment completed, Second RN was Mery BISHOP No skin issues.     P: Continue to monitor patient and intervene as needed. Continue with plan of care. Notify provider with any concerns or changes in patient status.

## 2025-01-20 ENCOUNTER — APPOINTMENT (OUTPATIENT)
Dept: GENERAL RADIOLOGY | Facility: CLINIC | Age: 65
DRG: 178 | End: 2025-01-20
Attending: STUDENT IN AN ORGANIZED HEALTH CARE EDUCATION/TRAINING PROGRAM
Payer: COMMERCIAL

## 2025-01-20 LAB
GLUCOSE BLDC GLUCOMTR-MCNC: 202 MG/DL (ref 70–99)
GLUCOSE BLDC GLUCOMTR-MCNC: 258 MG/DL (ref 70–99)
GLUCOSE BLDC GLUCOMTR-MCNC: 322 MG/DL (ref 70–99)
GLUCOSE BLDC GLUCOMTR-MCNC: 345 MG/DL (ref 70–99)

## 2025-01-20 PROCEDURE — 250N000011 HC RX IP 250 OP 636

## 2025-01-20 PROCEDURE — 250N000013 HC RX MED GY IP 250 OP 250 PS 637

## 2025-01-20 PROCEDURE — 999N000111 HC STATISTIC OT IP EVAL DEFER

## 2025-01-20 PROCEDURE — 250N000013 HC RX MED GY IP 250 OP 250 PS 637: Performed by: STUDENT IN AN ORGANIZED HEALTH CARE EDUCATION/TRAINING PROGRAM

## 2025-01-20 PROCEDURE — 99255 IP/OBS CONSLTJ NEW/EST HI 80: CPT | Performed by: PHYSICIAN ASSISTANT

## 2025-01-20 PROCEDURE — 73522 X-RAY EXAM HIPS BI 3-4 VIEWS: CPT

## 2025-01-20 PROCEDURE — 97530 THERAPEUTIC ACTIVITIES: CPT | Mod: GP

## 2025-01-20 PROCEDURE — 97116 GAIT TRAINING THERAPY: CPT | Mod: GP

## 2025-01-20 PROCEDURE — 99233 SBSQ HOSP IP/OBS HIGH 50: CPT | Performed by: STUDENT IN AN ORGANIZED HEALTH CARE EDUCATION/TRAINING PROGRAM

## 2025-01-20 PROCEDURE — 120N000002 HC R&B MED SURG/OB UMMC

## 2025-01-20 RX ORDER — DULOXETIN HYDROCHLORIDE 20 MG/1
20 CAPSULE, DELAYED RELEASE ORAL DAILY
Qty: 90 CAPSULE | Refills: 3 | Status: SHIPPED | OUTPATIENT
Start: 2025-01-20

## 2025-01-20 RX ORDER — CARVEDILOL 25 MG/1
25 TABLET ORAL 2 TIMES DAILY WITH MEALS
Status: COMPLETED | OUTPATIENT
Start: 2025-01-20 | End: 2025-01-20

## 2025-01-20 RX ORDER — CARVEDILOL 6.25 MG/1
12.5 TABLET ORAL 2 TIMES DAILY WITH MEALS
Status: DISCONTINUED | OUTPATIENT
Start: 2025-01-21 | End: 2025-01-21 | Stop reason: HOSPADM

## 2025-01-20 RX ORDER — NICOTINE 21 MG/24HR
1 PATCH, TRANSDERMAL 24 HOURS TRANSDERMAL DAILY
Status: DISCONTINUED | OUTPATIENT
Start: 2025-01-20 | End: 2025-01-21 | Stop reason: HOSPADM

## 2025-01-20 RX ADMIN — ACETAMINOPHEN 975 MG: 325 TABLET, FILM COATED ORAL at 06:24

## 2025-01-20 RX ADMIN — NICOTINE 1 PATCH: 21 PATCH, EXTENDED RELEASE TRANSDERMAL at 07:52

## 2025-01-20 RX ADMIN — CYANOCOBALAMIN TAB 1000 MCG 1000 MCG: 1000 TAB at 07:46

## 2025-01-20 RX ADMIN — CARVEDILOL 25 MG: 12.5 TABLET, FILM COATED ORAL at 07:46

## 2025-01-20 RX ADMIN — LEVETIRACETAM 500 MG: 500 TABLET, FILM COATED ORAL at 19:17

## 2025-01-20 RX ADMIN — INSULIN ASPART 3 UNITS: 100 INJECTION, SOLUTION INTRAVENOUS; SUBCUTANEOUS at 06:30

## 2025-01-20 RX ADMIN — ACETAMINOPHEN 975 MG: 325 TABLET, FILM COATED ORAL at 22:26

## 2025-01-20 RX ADMIN — LEVOTHYROXINE SODIUM 75 MCG: 75 TABLET ORAL at 07:46

## 2025-01-20 RX ADMIN — NICOTINE POLACRILEX 2 MG: 2 GUM, CHEWING BUCCAL at 03:58

## 2025-01-20 RX ADMIN — ACETAMINOPHEN 975 MG: 325 TABLET, FILM COATED ORAL at 12:59

## 2025-01-20 RX ADMIN — LIDOCAINE 4% 1 PATCH: 40 PATCH TOPICAL at 06:25

## 2025-01-20 RX ADMIN — ENOXAPARIN SODIUM 40 MG: 40 INJECTION SUBCUTANEOUS at 06:25

## 2025-01-20 RX ADMIN — GABAPENTIN 100 MG: 100 CAPSULE ORAL at 22:26

## 2025-01-20 RX ADMIN — DULOXETINE HYDROCHLORIDE 20 MG: 20 CAPSULE, DELAYED RELEASE ORAL at 07:46

## 2025-01-20 RX ADMIN — CARVEDILOL 25 MG: 25 TABLET, FILM COATED ORAL at 17:19

## 2025-01-20 RX ADMIN — LEVETIRACETAM 500 MG: 500 TABLET, FILM COATED ORAL at 07:46

## 2025-01-20 RX ADMIN — ASPIRIN 81 MG CHEWABLE TABLET 81 MG: 81 TABLET CHEWABLE at 07:46

## 2025-01-20 ASSESSMENT — ACTIVITIES OF DAILY LIVING (ADL)
ADLS_ACUITY_SCORE: 72
DEPENDENT_IADLS:: CLEANING;COOKING;LAUNDRY;SHOPPING;MEAL PREPARATION;MEDICATION MANAGEMENT;MONEY MANAGEMENT;TRANSPORTATION;INCONTINENCE
ADLS_ACUITY_SCORE: 79
ADLS_ACUITY_SCORE: 72
ADLS_ACUITY_SCORE: 79
ADLS_ACUITY_SCORE: 72
ADLS_ACUITY_SCORE: 79
ADLS_ACUITY_SCORE: 72
ADLS_ACUITY_SCORE: 79
ADLS_ACUITY_SCORE: 72
ADLS_ACUITY_SCORE: 72
ADLS_ACUITY_SCORE: 79
ADLS_ACUITY_SCORE: 72
ADLS_ACUITY_SCORE: 79
ADLS_ACUITY_SCORE: 72
ADLS_ACUITY_SCORE: 79

## 2025-01-20 NOTE — PLAN OF CARE
Goal Outcome Evaluation:    Plan of Care Reviewed With: caregiver    Overall Patient Progress: improving    Outcome Evaluation: Per dtr, pt lives in an apartment with a caregiver present at all times. Discharge plan is to return to prior arrangement

## 2025-01-20 NOTE — PROGRESS NOTES
RNCC participated at interdisciplinary rounds and performed chart review. Patient will likely be able to discharge tomorrow. Please reference SW's note for further information.    SW assessed the patient and delegated setting up home care to RNCC. [facility].     Next Steps:  -Follow up on home care referrals      dAriana Wynn RN Care Coordinator  Reachable on [facility] or Teams  [phone] (updated daily)
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
## 2025-01-20 NOTE — CONSULTS
Inpatient Diabetes Management Service : New Consult Note     Patient: Isabell Matthews   YOB: 1960    MRN: [id number]     Date of Consult : 01/20/2025   Date of Admission: 1/15/2025     Reason for Consult: pooly controlled diabetes    Consult Requestor: Barbara Georges MD            History of Present Illness:   roxane Matthews is a 64 year old woman with history of HTN, HLD, CKD IIIb, T1DM c/b neuropathy, ischemic and hemorrhagic strokes (right basal ganglia ischemic infarct in 2018, left basal ganglia hemorrhagic stroke 2021, right centrum semiovale & right chuck 2023) with residual R facial droop and vascular dementia, seizures, and recurrent UTI admitted with weakness in the setting of covid infection.  She was admitted for safe disposition planning.  PT now recommends discharge home.  Admission complicated by hyperglycemia and hypertension.            Additional Historian:  Daughter and caregiver, Maria Guadalupe (all history gathered from daughter, patient has dementia and is unable to verbalize her history). Patient has 24/7 care between daughter and other caregivers.     Isabell Britt is  known to the Inpatient Diabetes Service from past admission(s).She was last seen on 6/17/2024 to 6/18/2024.    Last dose insulin PTA: 1/15/2025   Current inpatient regimen:  Lantus 20 units every 24 hours and Medium correction scale    BG at time of consult: 322  Planned Procedures/Surgeries: none    Relevant Labs on Admission:  if contributory, otherwise see labs below        Inpatient Glucose Trends:            Diabetes History:   Diabetes Type and Duration:  DM for > 40 years, diagnosed in her 40s. Per daughter,  initially told she has T2DM then T1DM for a while then flipped back to being told T2DM.    6/17/2024 positive GAD65 antibody,, and c-peptide <0.1 but BG elevated and need to be repeated     PTA Medication Regimen: Lantus 19 units with 2 units per meal but not fixed meal dose at day program and 1/50 correction scale  that started at 150.  IF she eats then give correction + meal dose.  If bg <150 does not always give fixed meal dose  Missing doses?: none  Historical Diabetes Medications: none    Glucose monitoring device/frequency/trends:  glucometer before meals and at bedtime. Has tried and failed multiple CGMs (issues with accuracy or patient picking CGM off).   Hypoglycemia PTA:   - Frequency: 1-2 times per month, better since discharged in 6/2024  - Severity: Hx of severe low a few months ago 2/2 patient grabbing Novolog and injecting 20 units (daughter now keeps insulin out of patient's reach)   -  Outpatient Diabetes Provider:  PCP - Bony Castaneda   Formal Diabetes Education/Educator: no    ------------------------  Complications:   + peripheral neuropathy on gabapentin, no known retinopathy albeit last eye exam: >4 yrs ago, PCP just placed referral for optho  5/20/24, +nephropathy (CKD), no gastroparesis, +macrovascular disease  (hx of MI and multiple strokes)   Most recent A1c: 9.6 on 1/16/2025    Hemoglobin at time of most recent A1c: 11.4  RBC transfusion 3 months prior to most recent A1c:  none      History of DKA:  yes, 2011, 2020 and 2022        Diet/Lifestyle/Living Situation: 3 meals a day, daughter cooks meals for her. Sometimes her daughter has to sit with her and make sure she eats now    Ability to [place] Prescribed Regimen:  Daughter very invested in insulin and keeping bg under control   Food/Housing Insecurity: no          Medications Impacting Glycemia:    Steroids: none  D5W containing solutions/medications: none  Other medications impacting glucose: none         Diet/Nutrition:    Orders Placed This Encounter      Regular Diet Adult     Supplements:  none  TF: none  TPN: none          Review of Systems:    CC:   Constitutional: - fever and chills.   Cardiac: - chest pain, palpitations, or racing heart.    Respiratory: - dyspnea at rest. - wheezing and cough,   GI: - abdominal pain, tenderness  and bloating. - nausea and vomiting. - changes in stool pattern, constipation and diarrhea.    Endocrine: - polyuria, polydipsia           Past Medical History:       Past Medical History:   Diagnosis Date    Chronic pain     Coronary atherosclerosis 2016    Essential hypertension     Ex-cigarette smoker     quit 2018 over 35 years    Ex-cigarette smoker     Hyperlipidemia     Hypothyroid     Seizures (H)     Stroke (H)     Vascular dementia (H)              Past Surgical History:      Past Surgical History:   Procedure Laterality Date    athroplasty hip Bilateral     ,     OTHER SURGICAL HISTORY      athroplasty hip    PICC DOUBLE LUMEN PLACEMENT Right 2023    right basilic 5 fr dl power picc 39 cm    STENT               Social History:      Social History     Tobacco Use    Smoking status: Former     Current packs/day: 0.00     Average packs/day: 0.5 packs/day for 35.0 years (17.5 ttl pk-yrs)     Types: Cigarettes     Start date: 1983     Quit date: 2018     Years since quittin.5    Smokeless tobacco: Never   Substance Use Topics    Alcohol use: Not Currently        History   Sexual Activity    Sexual activity: Not Currently             Family History:    Family History of Diabetes: Did not ask daughter    Family History   Problem Relation Age of Onset    Acute Myocardial Infarction Father     Heart Disease Maternal Grandmother     Dementia Maternal Grandmother     Myocardial Infarction Father     Dementia Paternal Grandmother     Heart Disease Paternal Grandmother              Physical Exam:    BP (!) 170/72   Pulse 79   Temp 97.9  F (36.6  C) (Oral)   Resp 16   Wt 60.9 kg (134 lb 4.2 oz)   SpO2 97%   BMI 23.79 kg/m     General:  well appearing, in no acute distress.Sitting inc hair  HEENT:  NC/AT. MMM, sclera not injected  Lungs:  unremarkable, no audible cough, no SOB  ABD:  rounded, soft, non-tender  Skin:  warm and dry, no obvious lesions  MSK:   moving all  extremities  Lymp:   no LE edema  Mental Status:  Alert and oriented x1             Laboratory Data:      Lab Results   Component Value Date    A1C 9.6 (H) 01/16/2025    A1C 11.2 (H) 09/23/2024    A1C 10.2 (H) 05/20/2024    A1C 10.7 (H) 01/29/2024    A1C 9.3 (H) 09/04/2023    A1C 7.8 (H) 06/21/2021    A1C 11.7 (H) 09/27/2020     Recent Labs   Lab Test 01/19/25  0737 01/17/25  0718   WBC  --  4.9   RBC  --  3.74*   HGB  --  10.6*   HCT  --  32.2*   MCV  --  86   MCH  --  28.3   MCHC  --  32.9   RDW  --  15.2*   * 162     Recent Labs   Lab Test 01/20/25  1204 01/20/25  0623 01/19/25  0758 01/19/25  0737 01/17/25  0803 01/17/25  0718 01/16/25  0932 01/16/25  0010   NA  --   --   --   --   --  141  --  142   POTASSIUM  --   --   --   --   --  4.0  --  4.0   CHLORIDE  --   --   --   --   --  108*  --  107   CO2  --   --   --   --   --  24  --  25   ANIONGAP  --   --   --   --   --  9  --  10   * 258*   < >  --    < > 151*   < > 184*   BUN  --   --   --   --   --  33.1*  --  37.5*   CR  --   --   --  1.52*  --  1.51*   < > 1.40*   VERONICA  --   --   --   --   --  8.7*  --  9.1    < > = values in this interval not displayed.     Recent Labs   Lab Test 01/17/25 0718   PROTTOTAL 5.6*   ALBUMIN 3.3*   BILITOTAL 0.2   ALKPHOS 128   AST 21   ALT 14            Assessment and Recommendations:     Isabell Matthews is a 64 year old woman with history of HTN, HLD, CKD IIIb, T1DM c/b neuropathy, ischemic and hemorrhagic strokes (right basal ganglia ischemic infarct in 2018, left basal ganglia hemorrhagic stroke 2021, right centrum semiovale & right chuck 2023) with residual R facial droop and vascular dementia, seizures, and recurrent UTI admitted with weakness in the setting of covid infection.  She was admitted for safe disposition planning.  PT now recommends discharge home.  Admission complicated by hyperglycemia and hypertension.   Assessment:   Most likely Type 1 versus Type 2  Diabetes Mellitus with DEMETRIA elevated at 6.2  "and C-peptide=<0.1 on 6/17/2024, complicated by peripheral neuropathy, nephropathy, hx of DKA and hypoglycemia. Sub-optimal control, last A1c 10.2% (5/20/2024).   Dementia - daughter is caregiver and manages diabetes for patient.   COVID  Possible stress induced hyperglycemia    Plan/Recommendations:    - Continue Lantus 20 units every 24 hours 24 hrs at 22:00   - Add Novolog Meal Coverage: 1 units per 20 g CHO, TID AC and PRN with snacks/supplements   -Repeat c-peptide when BG come down  - Adjust Novolog Correction Scale: Medium resistance (ISF: 50)  TID AC / HS / 0200   - BG monitoring: TID AC, HS, 0200 /    - Hypoglycemia protocol    - Carb counting protocol     Discussion:  Had long discussion with Daughter Trisha who is very invested in caring for her mother's diabetes.  With her current regimen she has had maybe 1-2 low blood glucose per month now and the lowest=65.  She does get these \"weird' days where her mom gets high before bedtime to the 400's. This happens maybe  5 -6 times per month. She does give correction insulin for these highs. Patient does go to daytime program where she cannot get fixed meal dose but they will give correction insulin.  Will add carb coverage today. Will continue lantus 20 units that she got yesterday.  Discussed with RN and RN assistance who brought her ice cream the  importance of giving her carb coverage.  She is in  isolation for COVID. She was admitted for weakness and falls. I am reluctant to change the regimen too much as daughter feels like this works.  A1c dropped from 11.2 to 9.6.  With her dementia feel like tight control to 7% will results in lows and be more harmful.    Discharge Planning: (tentative)    Medications: TBD    Test Claims:  none needed.   Education:  Needs to be assessed closer to discharge but daughter gives all her insulin due to dementia.  She gives fixed meal dose short acting and uses a correction scale  Outpatient Follow-up:  recommend  PCP "       Thank you for this consult. IDS will continue to follow.      Please notify Inpatient Diabetes Service if changes are planned to steroids, nutrition, TPN/TF and anticipated procedures requiring prolonged NPO status.     Marisela Dominguez PA-C  Inpatient Diabetes Service  [phone]  Available by [employer]  Date of Service: 1/20/2025    Discussed plan with daughter over the phone, [place], with bedside RN in person and primary team via this note.    To contact Inpatient Diabetes Service:     7 AM - 5 PM: Page the IDS BULMARO following the patient that day (see filed or incomplete progress notes/consult notes under Endocrinology)    OR if uncertain of provider assignment: page job code [phone]    5 PM - 7 AM: First call after hours is to primary service.    For urgent after-hours questions, page job code for on call fellow: [phone]     I spent a total of 80 minutes on the date of the encounter doing prep/post-work, chart review, history and exam, documentation and further activities per the note including lab review, multidisciplinary communication, counseling the patient and/or coordinating care regarding acute hyper/hypoglycemic management, as well as discharge management and planning/communication.  See note for details.

## 2025-01-20 NOTE — PLAN OF CARE
[id number]    Goal Outcome Evaluation:      Plan of Care Reviewed With: patient    Overall Patient Progress: no changeOverall Patient Progress: no change      Patient is alert to self. Able to follow commands. Slow to respond. Able to reposition self independent ly. Incontinent of bowel and bladder. LBM: 01/19.     RA. Regular diet, takes pill whole with water. No IV access. Denies pain, SOB, chest pain and n/v. Baseline neuropathy. Lidocaine patch on L shoulders. Residual facial droop.     Bedside attendant reported that patient is adamant about going outside to smoke. Patient reported that she smokes 1ppd, PRN nicotine gum given.     Sitter present inside the room and bed alarm on for safety. Special precaution maintained. Continue with POC.     BP (!) 169/74   Pulse 78   Temp 98  F (36.7  C) (Oral)   Resp 18   SpO2 96%     Cess CARPI Bess, RN on 1/20/2025 at 6:56 AM

## 2025-01-20 NOTE — PLAN OF CARE
"Goal Outcome Evaluation:  BP (!) 162/80 (BP Location: Right arm, Patient Position: Semi-Cantu's, Cuff Size: Adult Regular)   Pulse 78   Temp 98.4  F (36.9  C) (Oral)   Resp 18   SpO2 96%     Problem: Adult Inpatient Plan of Care  Goal: Plan of Care Review  Description: The Plan of Care Review/Shift note should be completed every shift.  The Outcome Evaluation is a brief statement about your assessment that the patient is improving, declining, or no change.  This information will be displayed automatically on your shift  note.  Outcome: Progressing  Flowsheets (Taken 1/19/2025 1803)  Outcome Evaluation: Patient only alert to self. Able to follow commands,continues to responds and speaks slowly inform of few words. Able to reposition self independently. Incontinent of bowel and bladder. LBM: 01/19. Continues on regular diet, B/S checks with sliding scale insulin. Continues to take pill crush in pudding/apple sauce. L.PIV-SL. Denies pain, SOB, chest pain and n/v. Baseline neuropathy. Residual facial droop. Continues on Special precaution, bed alarm on for safety, call light within reach, plan of Continues.  Plan of Care Reviewed With: patient  Overall Patient Progress: no change  Goal: Patient-Specific Goal (Individualized)  Description: You can add care plan individualizations to a care plan. Examples of Individualization might be:  \"Parent requests to be called daily at 9am for status\", \"I have a hard time hearing out of my right ear\", or \"Do not touch me to wake me up as it startles  me\".  Outcome: Progressing  Goal: Absence of Hospital-Acquired Illness or Injury  Outcome: Progressing  Intervention: Prevent Skin Injury  Recent Flowsheet Documentation  Taken 1/19/2025 9941 by Tisha Powers RN  Body Position: position changed independently  Goal: Optimal Comfort and Wellbeing  Outcome: Progressing  Goal: Readiness for Transition of Care  Outcome: Progressing     Problem: Fall Injury Risk  Goal: Absence of " Fall and Fall-Related Injury  Outcome: Progressing     Problem: Comorbidity Management  Goal: Blood Glucose Levels Within Targeted Range  Outcome: Progressing  Goal: Blood Pressure in Desired Range  Outcome: Progressing  Goal: Maintenance of Seizure Control  Outcome: Progressing     Problem: Infection  Goal: Absence of Infection Signs and Symptoms  Outcome: Progressing     Problem: Pain Acute  Goal: Optimal Pain Control and Function  Outcome: Progressing       Plan of Care Reviewed With: patient    Overall Patient Progress: no changeOverall Patient Progress: no change  Outcome Evaluation: Patient only alert to self. Able to follow commands,continues to responds and speaks slowly. Able to reposition self independently. Incontinent of bowel and bladder. LBM: 01/19. Continues on regular diet, B/S checks with sliding scale insulin. Continues to take pill crush in pudding/apple sauce. L.PIV-SL. Denies pain, SOB, chest pain and n/v. Baseline neuropathy. Residual facial droop. Continues on Special precaution, bed alarm on for safety, call light within reach, plan of Continues.

## 2025-01-20 NOTE — PROGRESS NOTES
Post Fall Assessment Note    S: Patient had a(n): witnessed, assisted fall.    B: Patient's orientation/mental status at time of fall:  disoriented and confused.   Medications administered within 8 hours of fall:    PCA/Opiates:  No    Hypnotics:  No    Psychotropics: No    Antihypertensives:  Yes    Sedatives:  No   Location of fall: doorway inside patient's room    A: Type of injury from fall: NA   Patient status: Stable, she was helped off the floor with assist of 3 people and gait belt and walker.     Patient was lifted from fall event:  Equipment used to assist   Interventions: a bedside attendant was put in place, after patient was back to bed and cleaned.    MD notified: Deepali Mcgovern   Family member/next of kin notified:  Yes Vishal Peña (Daughter)  [phone] ([place])   R: Falls assessment completed in [facility]:  Yes   Care Plan updated:  Yes

## 2025-01-20 NOTE — PLAN OF CARE
Occupational Therapy: Orders received. Chart reviewed and discussed with care team.? Occupational Therapy not indicated due to pt with no acute OT needs at this time. Per discussion with PT following conversation w/ pt's daughter, pt has 24/7 assist and requiring cues/supervision for all ADL at baseline. PT following to progress safety and IND w/ functional mobility and transfers while IP.? Defer discharge recommendations to Physical Therapy.? Will complete orders.

## 2025-01-20 NOTE — PROGRESS NOTES
[facility]    Medicine Progress Note - Hospitalist Service, GOLD TEAM 22    Date of Admission:  1/15/2025    Assessment & Plan   Isabell Matthews is a 64 year old woman with history of HTN, HLD, CKD IIIb, T1DM c/b neuropathy, ischemic and hemorrhagic strokes (right basal ganglia ischemic infarct in 2018, left basal ganglia hemorrhagic stroke 2021, right centrum semiovale & right chuck 2023) with residual R facial droop and vascular dementia, seizures, and recurrent UTI admitted with weakness in the setting of covid infection.  She was admitted for safe disposition planning.  PT now recommends discharge home.  Admission complicated by hyperglycemia and hypertension.    Today:  - Endocrine consulted to assist with blood glucose management  - Plan for discharge tomorrow, to home  - Patient's daughter was updated on the phone.  - Bilateral hip x-ray  - Resume amlodipine and statin starting tomorrow  - Give 25 mg of Coreg today, return to PTA dosing of 2.5 twice daily  - Appreciate RNCC/social work assistance with discharge planning.  They will set up a stretcher transport.  Anticipate discharge tomorrow.       Generalized weakness  Covid 19 infection  Dementia  Presenting with weakness and falls - at baseline highly dependent on daughter for cares.  Stroke (CT head/CT angio, neurology evaluation), cardiac (EKG without change, negative troponin) workup negative.  CT abdomen did not show acute pathology.  Labs overall reassuring except for positive COVID: 01/16-elevated troponin, trended down, UA with glucose, negative for infection, flu, RSV negative, normal Keppra level, A1c 9.6: Improved from 11.23 months ago, TSH normal, lactate normal, normal INR normal LFTs, no leukocytosis on CBC.  She had COVID symptoms about a week ago which could certainly explain symptoms particularly given poor baseline.  She received 3 doses of paxlovid (last dose 01/17) prior to discontinuation in the  setting of patient stability and interactions with home medications.  Previously was intermittently sleepy, VBG wnl and glucose wnl will wake up and answer questions appropriately however slow to respond.    - No current  - Supportive cares concerns for encephalopathy.  - No indication for steroids, patient on room air  - Fall precautions     Falls  R/o falls sequelae  CTC spine, xrays of left arm/shoulder, pelvis/hip without acute abnormality. R shoulder without fracture. 1/16 overnight had a fall, unwitnessed, CT head and CT neck without fracture or bleeding.  Patient had an assisted fall on 01/20.  Per nursing documentation it was witnessed, she was helped off the floor with assist.  Unclear if patient hit any part of her body.  Patient has dementia and is unable to provide good history.  Reporting hip and leg pain, however was able to get in the chair just fine  - Obtain bilateral hip x-ray  - Seen by PT, recommend discharge home  - Switch to SCDs instead of Lovenox    H/o ischemic and hemorrhagic strokes (multiple)  Seizures  Hypertension  Hyperlipidemia  No acute concerns, see above. CTA with extensive multifocal areas of luminal irregularity, slightly progressed when compared to 3/7/2024, has neurology follow up as outpatient.   - HOLDing home amlodipine secondary to interactions with paxlovid. Needs three days prior to resumption (resume 01/21)  - Continue increased carvedilol 25mg BID for today while holding amlodipine (was on 12.5 twice daily prior to admission).  Return to PTA dosing on 01/21.   - Continue PTA aspirin  - Continue PTA keppra BID  - holding home statin iso paxlovid (resume 01/21)     Type 1 diabetes mellitus, poorly controlled  A1c 9.6% 01/17.  Was 11.2, 3 months ago  - Endocrine consulted to assist with glucose management, and close follow-up at discharge.  - glargine 20 units  - medium dose correction, hypoglycemia protocol  - Continue PTA gabapentin for neuropathy      Hypothyroidism  -Continue PTA levothyroxine     H/o recurrent UTI   UA 01/17 negative for infection.  No acute inpatient concerns.     Chronic kidney disease stage III  Renal function at baseline w/o electrolyte derangements     Depression  -Continue PTA duloxetine     Indeterminant hypodense focus in the left renal upper pole  -follow up as outpatient     Anemia  - Hemoglobin 11.4 on admission.  10.6 on 01/17.  Baseline~12          Diet: Regular Diet Adult    DVT Prophylaxis: Pneumatic Compression Devices  Parker Catheter: Not present  Lines: None     Cardiac Monitoring: None  Code Status: No CPR- Do NOT Intubate      Clinically Significant Risk Factors               # Hypoalbuminemia: Lowest albumin = 3.3 g/dL at 1/17/2025  7:18 AM, will monitor as appropriate     # Hypertension: Noted on problem list     # Dementia: noted on problem list            # Financial/Environmental Concerns:           Social Drivers of Health    Housing Stability: Unknown (8/25/2024)    Housing Stability     Do you have housing? : Patient unable to answer     Are you worried about losing your housing?: Patient unable to answer   Tobacco Use: Medium Risk (11/21/2024)    Patient History     Smoking Tobacco Use: Former     Smokeless Tobacco Use: Never   Financial Resource Strain: Unknown (8/25/2024)    Financial Resource Strain     Within the past 12 months, have you or your family members you live with been unable to get utilities (heat, electricity) when it was really needed?: Patient unable to answer   Transportation Needs: Unknown (8/25/2024)    Transportation Needs     Within the past 12 months, has lack of transportation kept you from medical appointments, getting your medicines, non-medical meetings or appointments, work, or from getting things that you need?: Patient unable to answer          Disposition Plan     Medically Ready for Discharge: Anticipated Tomorrow             Barbara Georges MD  Hospitalist Service, [facility] TEAM  22  [facility]  Securely message with [employer] (more info)  Text page via [employer] Paging/Directory   See signed in provider for up to date coverage information  ______________________________________________________________________    Interval History   Witness, assisted fall. Disoriented and confused.  Patient has dementia, difficult to get history from her.  She denies any chest pain, shortness of breath, abdominal pain, nausea, vomiting.  Reported hip and lower extremity pain.    Physical Exam   Vital Signs: Temp: 98  F (36.7  C) Temp src: Oral BP: (!) 169/74 Pulse: 78   Resp: 18 SpO2: 96 % O2 Device: None (Room air)    Weight: 0 lbs 0 oz    General Appearance: Sitting in chair, no acute distress.    Respiratory: Breathing comfortably on room air. Lungs are clear to auscultation bilaterally.    Cardiovascular: Regular rate and rhythm, extremities perfused.   GI: Normal bowel sounds, Soft, nontender, nondistended  Extremities: No lower extremity edema.  Neurology:  moving all extremities appropriately oriented to self, not oriented to situation or time.    Medical Decision Making       50 MINUTES SPENT BY ME on the date of service doing chart review, history, exam, documentation & further activities per the note.      Data   ------------------------- PAST 24 HR DATA REVIEWED -----------------------------------------------        Imaging results reviewed over the past 24 hrs:   No results found for this or any previous visit (from the past 24 hours).

## 2025-01-21 VITALS
BODY MASS INDEX: 23.79 KG/M2 | DIASTOLIC BLOOD PRESSURE: 77 MMHG | SYSTOLIC BLOOD PRESSURE: 126 MMHG | WEIGHT: 134.26 LBS | HEART RATE: 80 BPM | OXYGEN SATURATION: 97 % | TEMPERATURE: 98.2 F | RESPIRATION RATE: 16 BRPM

## 2025-01-21 LAB
GLUCOSE BLDC GLUCOMTR-MCNC: 117 MG/DL (ref 70–99)
GLUCOSE BLDC GLUCOMTR-MCNC: 138 MG/DL (ref 70–99)
GLUCOSE BLDC GLUCOMTR-MCNC: 205 MG/DL (ref 70–99)
GLUCOSE BLDC GLUCOMTR-MCNC: 63 MG/DL (ref 70–99)
GLUCOSE BLDC GLUCOMTR-MCNC: 75 MG/DL (ref 70–99)

## 2025-01-21 PROCEDURE — 250N000013 HC RX MED GY IP 250 OP 250 PS 637: Performed by: STUDENT IN AN ORGANIZED HEALTH CARE EDUCATION/TRAINING PROGRAM

## 2025-01-21 PROCEDURE — 99238 HOSP IP/OBS DSCHRG MGMT 30/<: CPT | Performed by: STUDENT IN AN ORGANIZED HEALTH CARE EDUCATION/TRAINING PROGRAM

## 2025-01-21 PROCEDURE — 99232 SBSQ HOSP IP/OBS MODERATE 35: CPT

## 2025-01-21 PROCEDURE — 250N000013 HC RX MED GY IP 250 OP 250 PS 637

## 2025-01-21 RX ADMIN — DULOXETINE HYDROCHLORIDE 20 MG: 20 CAPSULE, DELAYED RELEASE ORAL at 09:11

## 2025-01-21 RX ADMIN — LIDOCAINE 4% 1 PATCH: 40 PATCH TOPICAL at 05:12

## 2025-01-21 RX ADMIN — LEVETIRACETAM 500 MG: 500 TABLET, FILM COATED ORAL at 09:12

## 2025-01-21 RX ADMIN — ASPIRIN 81 MG CHEWABLE TABLET 81 MG: 81 TABLET CHEWABLE at 09:12

## 2025-01-21 RX ADMIN — CYANOCOBALAMIN TAB 1000 MCG 1000 MCG: 1000 TAB at 09:11

## 2025-01-21 RX ADMIN — LEVOTHYROXINE SODIUM 75 MCG: 75 TABLET ORAL at 09:11

## 2025-01-21 RX ADMIN — ACETAMINOPHEN 975 MG: 325 TABLET, FILM COATED ORAL at 12:56

## 2025-01-21 RX ADMIN — NICOTINE 1 PATCH: 21 PATCH, EXTENDED RELEASE TRANSDERMAL at 09:12

## 2025-01-21 RX ADMIN — ATORVASTATIN CALCIUM 40 MG: 40 TABLET, FILM COATED ORAL at 09:12

## 2025-01-21 RX ADMIN — CARVEDILOL 12.5 MG: 6.25 TABLET, FILM COATED ORAL at 09:22

## 2025-01-21 RX ADMIN — AMLODIPINE BESYLATE 5 MG: 5 TABLET ORAL at 09:11

## 2025-01-21 ASSESSMENT — ACTIVITIES OF DAILY LIVING (ADL)
ADLS_ACUITY_SCORE: 80
ADLS_ACUITY_SCORE: 80
ADLS_ACUITY_SCORE: 79
ADLS_ACUITY_SCORE: 80
ADLS_ACUITY_SCORE: 80
ADLS_ACUITY_SCORE: 79
ADLS_ACUITY_SCORE: 80
ADLS_ACUITY_SCORE: 79
ADLS_ACUITY_SCORE: 80
ADLS_ACUITY_SCORE: 79
ADLS_ACUITY_SCORE: 80

## 2025-01-21 NOTE — PROGRESS NOTES
[id number]    VS: BP (!) 157/72 (BP Location: Right arm)   Pulse 82   Temp 98.1  F (36.7  C) (Oral)   Resp 16   Wt 60.9 kg (134 lb 4.2 oz)   SpO2 96%   BMI 23.79 kg/m     O2: Stable on **. Denies SOB   Output: Continent of bowel and bladder, voids spontaneously w/o difficulty   Last BM:    Activity: Assist of 2   Skin: X   Pain: Denies              CMS: A&O with intermittent confusion   Dressing: none   Diet: Regular   LDA: none   Equipment: Personal belongings   Plan: Call light in reach, continue POC   Additional Info:  1.1 attendant for safety

## 2025-01-21 NOTE — PLAN OF CARE
6MS DISCHARGE    D: Patient discharged to home at 1633. Patient accompanied by transport.    I: Discharge prescriptions given to patient. All discharge medications and instructions reviewed with daughter. Patient instructed to seek care if experiencing worsening symptoms, such as weakness. Other phone numbers to call with questions or concerns after discharge reviewed. Education completed.    A: Daughter verbalized understanding of discharge medications and instructions. Prescribed home medications given to patient.  Belongings returned to patient.    P: Patient to follow-up on with primary.

## 2025-01-21 NOTE — PROGRESS NOTES
Care Management Discharge Note    Discharge Date: 01/21/2025       Discharge Disposition: Home    Discharge Services: Transportation Services, Home Care    Discharge DME: Other (see comment) ([facility])    Discharge Transportation: agency    Private pay costs discussed: transportation costs    Does the patient's insurance plan have a 3 day qualifying hospital stay waiver?  No    PAS Confirmation Code:  [id number]  Patient/family educated on Medicare website which has current facility and service quality ratings: yes    Education Provided on the Discharge Plan: Yes  Persons Notified of Discharge Plans: Pt, family, PCA, HC agency  Patient/Family in Agreement with the Plan: yes    Handoff Referral Completed: Yes, non-[facility] PCP: External handoff communication completed    Additional Information:  Per provider rounds, pt medically ready for discharge home with resumption of PCA services and home PT/OT. [facility] is accepting HC agency. [facility] set for pickup window between [id number]. Writer confirmed with pt's daughter, Vishal, that she will be home when pt arrives. No further discharge needs at this time.       [facility] - Home Health    Services Available   Home Health Services      Address   [address] [place] [id number]             Contact Information    [phone]        [facility]  [phone]      Jailyn Dersa, DELONTE   Nurse Coordinator    6 Med/Surg  Phone: [phone]    Social Work and Care Management Department     SEARCHABLE in [facility] - search CARE COORDINATOR   [facility] [place] ([phone]) Saturday & Sunday; ([phone]) [facility] Recognized Holidays   Units: 6 Med Surg Vocera & 8 Med Surg Vocera Pager [phone]

## 2025-01-21 NOTE — DISCHARGE SUMMARY
"[facility]  Hospitalist Discharge Summary      Date of Admission:  1/15/2025  Date of Discharge:  {DISCHARGE DATE:977920}  Discharging Provider: Oziel Wyatt MD  Discharge Service: Hospitalist Service, GOLD TEAM 22    Discharge Diagnoses   ***    Clinically Significant Risk Factors          Follow-ups Needed After Discharge   Follow-up Appointments       Adult [facility] Follow-up and recommended labs and tests      Follow up with primary care provider, Ronnie Kellogg, within 7 days for hospital follow- up.  No follow up labs or test are needed.      Appointments on [place] and/or [facility] (with [facility] or [facility] provider or service). Call [phone] if you haven't heard regarding these appointments within 7 days of discharge.            {Additional follow-up instructions/to-do's for PCP    :***}    Unresulted Labs Ordered in the Past 30 Days of this Admission       No orders found from 12/16/2024 to 1/16/2025.        These results will be followed up by ***    Discharge Disposition   {Discharge to:8982852::\"Discharged to home\"}  Condition at discharge: {CONDITION:992147::\"Stable\"}    Hospital Course   {OPTIONAL -- use to select A&P section   :558436}    Consultations This Hospital Stay   NEUROLOGY STROKE ADULT IP CONSULT  PHYSICAL THERAPY ADULT IP CONSULT  OCCUPATIONAL THERAPY ADULT IP CONSULT  CARE MANAGEMENT / SOCIAL WORK IP CONSULT  ENDOCRINE DIABETES ADULT IP CONSULT    Code Status   No CPR- Do NOT Intubate    Time Spent on this Encounter   {How much time did you spend on discharge?:00392527}       Oziel Wyatt MD  [facility] MED SURG  [address] [place] [id number]  Phone: [phone]  Fax: [phone]  ______________________________________________________________________    Physical Exam   Vital Signs: Temp: 97.4  F (36.3  C) Temp src: Oral BP: (!) 144/79 Pulse: 80   Resp: 17 SpO2: 95 % O2 Device: None (Room air)  "   Weight: 134 lbs 4.16 oz  {Recommend personal SmartPhrase or Notewriter for exam (OPTIONAL)   :593077}       Primary Care Physician   Ronnie Kellogg    Discharge Orders      Primary Care - Care Coordination Referral      Home Care Referral      Reason for your hospital stay    You were admitted for weakness in the setting of COVID infection requiring safe disposition planning.  PT recommended discharge home.  Your stay was complicated by hyperglycemia and hypertension, now improved.         Activity    Your activity upon discharge: activity as tolerated     Adult [facility] Follow-up and recommended labs and tests    Follow up with primary care provider, Ronnie Kellogg, within 7 days for hospital follow- up.  No follow up labs or test are needed.      Appointments on [place] and/or [facility] (with [facility] or [facility] provider or service). Call [phone] if you haven't heard regarding these appointments within 7 days of discharge.     Diet    Follow this diet upon discharge: Current Diet:Orders Placed This Encounter      Regular Diet Adult       Significant Results and Procedures   {Data for Discharge Summary:876148}    Discharge Medications   Current Discharge Medication List        CONTINUE these medications which have CHANGED    Details   insulin glargine (LANTUS PEN) 100 UNIT/ML pen Inject 19 Units subcutaneously at bedtime.  Qty: 30 mL, Refills: 1    Comments: If Lantus is not covered by insurance, may substitute Basaglar or Semglee or other insulin glargine product per insurance preference at same dose and frequency.    Associated Diagnoses: Type 1 diabetes mellitus with other circulatory complication (H)           CONTINUE these medications which have NOT CHANGED    Details   acetaminophen (TYLENOL) 325 MG tablet Take 3 tablets (975 mg) by mouth every 8 hours.    Associated Diagnoses: Closed fracture of one rib of right side, initial encounter      amLODIPine (NORVASC) 5 MG tablet Take 1 tablet (5  mg) by mouth daily.  Qty: 90 tablet, Refills: 3    Associated Diagnoses: Essential hypertension      aspirin (ASA) 81 MG chewable tablet Take 1 tablet (81 mg) by mouth daily  Qty: 90 tablet, Refills: 3    Associated Diagnoses: Acute CVA (cerebrovascular accident) (H)      atorvastatin (LIPITOR) 40 MG tablet Take 1 tablet (40 mg) by mouth daily.  Qty: 90 tablet, Refills: 3    Associated Diagnoses: Hyperlipidemia LDL goal <70      !! blood glucose (NO BRAND SPECIFIED) test strip Use to test blood sugar 4 times daily or as directed.  Qty: 360 strip, Refills: 3    Associated Diagnoses: Type 1 diabetes mellitus with diabetic polyneuropathy (H)      !! blood glucose (NO BRAND SPECIFIED) test strip Use to test blood sugar 8 times daily or as directed.  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 blood glucose (TRUE METRIX BLOOD GLUCOSE TEST) test strip USE TO TEST BLOOD SUGAR 4 TO 5 TIMES DAILY OR AS DIRECTED  Qty: 400 strip, Refills: 3    Associated Diagnoses: Type 1 diabetes mellitus with other circulatory complication (H)      blood glucose monitoring (NO BRAND SPECIFIED) meter device kit Use to test blood sugar 4 times daily.  Please provide glucose meter that is covered by insurance.  Qty: 1 kit, Refills: 0    Associated Diagnoses: Type 1 diabetes mellitus with other circulatory complication (H); Type 1 diabetes mellitus with diabetic polyneuropathy (H)      blood glucose monitoring (ONE TOUCH ULTRA MINI) meter device kit Use to test blood sugar 4 times daily or as directed.  Qty: 1 kit, Refills: 0    Associated Diagnoses: Type 1 diabetes mellitus with diabetic polyneuropathy (H)      carvedilol (COREG) 12.5 MG tablet Take 1 tablet (12.5 mg) by mouth 2 times daily (with meals).  Qty: 180 tablet, Refills: 3    Associated Diagnoses: Essential hypertension      cyanocobalamin  (VITAMIN B-12) 1000 MCG tablet Take 1 tablet (1,000 mcg) by mouth daily  Qty: 100 tablet, Refills: 3    Associated Diagnoses: Vitamin B12 deficiency (non anemic)      gabapentin (NEURONTIN) 100 MG capsule Take 1 capsule (100 mg) by mouth at bedtime.  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 If no response after 15 minutes, additional 1 mg dose from a new device may be injected while waiting for emergency assistance.  Qty: 2 each, Refills: 1    Associated Diagnoses: Type 1 diabetes mellitus with other circulatory complication (H)      insulin aspart (NOVOLOG FLEXPEN) 100 UNIT/ML pen Inject 2 units with meals plus correction scale additional three times daily before meals  as follows  Pre-Meal  - 200 give additional 1 unit.  For Pre-Meal  -250 give 2 additional units.  For Pre-Meal -300 give 3 additional units.  For Pre-Meal -350 give 4 additional units.  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  Qty: 180 tablet, Refills: 3    Associated Diagnoses: Seizures (H)      loratadine (CLARITIN) 10 MG tablet Take 1 tablet (10 mg) by mouth daily.  Qty: 90 tablet, Refills: 3    Associated Diagnoses: Dermatitis      methocarbamol (ROBAXIN) 500 MG tablet  Take 1 tablet (500 mg) by mouth daily as needed for muscle spasms.  Qty: 30 tablet, Refills: 0    Associated Diagnoses: Closed fracture of one rib of right side, initial encounter      PENTIPS 32G X 4 MM miscellaneous Inject 1 each subcutaneously daily. Use 4 pen needles daily or as directed.      SYNTHROID 75 MCG tablet Take 1 tablet (75 mcg) by mouth daily.  Qty: 90 tablet, Refills: 3    Associated Diagnoses: Acquired hypothyroidism      zinc oxide (DESITIN) 20 % external ointment Apply topically as needed for dry skin or irritation  Qty: 85 g, Refills: 3    Associated Diagnoses: Fecal smearing      DULoxetine (CYMBALTA) 20 MG capsule TAKE 1 CAPSULE(20 MG) BY MOUTH DAILY  Qty: 90 capsule, Refills: 3    Associated Diagnoses: Fibromyalgia       !! - Potential duplicate medications found. Please discuss with provider.        STOP taking these medications       Lidocaine (LIDOCARE) 4 % Patch Comments:   Reason for Stopping:             Allergies   Allergies   Allergen Reactions    Seasonal Allergies       narrowing.    PARASPINAL: No extraspinal abnormality.      Impression    IMPRESSION:  1.  No fracture or posttraumatic subluxation.  2.  No high-grade spinal canal or neural foraminal stenosis.     XR Pelvis and Hip Bilateral 2 Views    Narrative    EXAM: XR PELVIS AND HIP BILATERAL 2 VIEWS  LOCATION: [facility]  DATE: 1/16/2025    INDICATION: fall, ? injury  COMPARISON: 6/16/2023      Impression    IMPRESSION: Bilateral hip arthroplasties. No displaced periprosthetic fracture. No evidence of dislocation. The pelvic ring appears intact. Atherosclerotic vascular calcifications.   CT Chest/Abdomen/Pelvis w Contrast    Narrative    EXAM: CT CHEST/ABDOMEN/PELVIS W CONTRAST  LOCATION: [facility]  DATE: 1/16/2025    INDICATION: recent URI sx's, weakness, falls, ? infection, injuries from falls, etc.  COMPARISON: CT from 9/23/2024 and 6/16/2023.  TECHNIQUE: CT scan of the chest, abdomen, and pelvis was performed following injection of IV contrast. Multiplanar reformats were obtained. Dose reduction techniques were used.   CONTRAST: 80ml isovue 370    FINDINGS:   LUNGS AND PLEURA: Dependent subsegmental atelectasis in the lower lobes. No acute airspace disease. No pneumothorax or pleural effusion.    MEDIASTINUM/AXILLAE: Normal heart size. Small pericardial effusion, similar to prior exam. Extensive coronary artery calcifications.    CORONARY ARTERY CALCIFICATION: Severe.    HEPATOBILIARY: Nondistended gallbladder. Liver within normal limits.    PANCREAS: Normal.    SPLEEN: Normal.    ADRENAL GLANDS: Normal.    KIDNEYS/BLADDER: Hypodense focus in the left renal upper pole measuring 3.1 cm, not a simple cyst by density criteria. There is cortical thinning of the left renal upper pole. There is a benign cyst in the right kidney requiring no follow-up. No   hydronephrosis. Bladder is within normal limits.    BOWEL: No bowel obstruction  or free air. Normal appendix.    LYMPH NODES: Normal.    VASCULATURE: Severe aortoiliac atherosclerotic calcification without aneurysm.    PELVIC ORGANS: There is a 4.6 x 4.4 cm heterogeneous focus in the right hemipelvis, similar to seen on comparison exams and may reflect an exophytic, subserosal uterine fibroid. No significant free fluid.    MUSCULOSKELETAL: Severe thoracic spine degenerative disc change with accentuated thoracic kyphosis. There are multiple chronic rib fracture deformities, with a possible subacute fracture of the posterior left 11th rib. Bilateral hip arthroplasties.      Impression    IMPRESSION:  1.  There are old bilateral rib fractures, but no acute, displaced fracture is evident.    2.  No solid organ injury.    3.  Severe coronary artery disease.    4.  Indeterminant hypodense focus in the left renal upper pole, not a simple cyst by density criteria. Recommend follow-up renal ultrasound when clinically feasible to differentiate complex cyst from solid lesion.   CTA Head Neck w Contrast    Narrative    EXAM: CT HEAD W/O CONTRAST, CTA HEAD NECK W CONTRAST  LOCATION: [facility]  DATE: 1/16/2025    INDICATION: fall, weakness  COMPARISON: 8/23/2024  CONTRAST: 80ml isovue 370  TECHNIQUE: Head and neck CT angiogram with IV contrast. Noncontrast head CT followed by axial helical CT images of the head and neck vessels obtained during the arterial phase of intravenous contrast administration. Axial 2D reconstructed images and   multiplanar 3D MIP reconstructed images of the head and neck vessels were performed by the technologist. Dose reduction techniques were used. All stenosis measurements made according to NASCET criteria unless otherwise specified.    FINDINGS:   NONCONTRAST HEAD CT:   INTRACRANIAL CONTENTS: No intracranial hemorrhage, extraaxial collection, or mass effect.  No CT evidence of acute infarct. Moderate presumed chronic small vessel  ischemic changes. Chronic infarct left basal ganglia and right insular cortex. Mild to   moderate generalized volume loss. No hydrocephalus. Chronic lacunar infarcts bilateral thalami.     VISUALIZED ORBITS/SINUSES/MASTOIDS: No intraorbital abnormality. No paranasal sinus mucosal disease. No middle ear or mastoid effusion.    BONES/SOFT TISSUES: No acute abnormality.    HEAD CTA:  ANTERIOR CIRCULATION: Extensive multifocal areas of luminal irregularity is slightly progressed when compared to 3/7/2024. The progression is most notable at the left A2 segment. No branch occlusion, aneurysm or AVM. Standard [place] anatomy.    POSTERIOR CIRCULATION: Extensive multifocal areas of luminal irregularity. No branch occlusion, significant flow-limiting stenosis, aneurysm or AVM. Balanced vertebral arteries supply a normal basilar artery.     DURAL VENOUS SINUSES: Expected enhancement of the major dural venous sinuses.    NECK CTA:  RIGHT CAROTID: No measurable stenosis or dissection.    LEFT CAROTID: Atherosclerotic plaque results in less than 50% stenosis in the left ICA. No dissection.    VERTEBRAL ARTERIES: No focal stenosis or dissection. Balanced vertebral arteries.    AORTIC ARCH: Classic aortic arch anatomy with no significant stenosis at the origin of the great vessels.    NONVASCULAR STRUCTURES: Unremarkable.      Impression    IMPRESSION:   HEAD CT:  No acute intracranial process.    HEAD CTA:  1.  Extensive multifocal areas of luminal irregularity are slightly progressed when compared to 3/7/2024. The progression is most notable at the left A2 segment.  2.  No branch occlusion, aneurysm or AVM.    NECK CTA:  No flow limiting stenosis or dissection.     Humerus XR,  G/E 2 views, left    Narrative    EXAM: LEFT SHOULDER AND HUMERUS 4 VIEWS  LOCATION: [facility]  DATE/TIME: 1/16/2025 2:47 AM CST    INDICATION: Fall. Pain.  COMPARISON: None.      Impression     IMPRESSION: No visualized acute fracture or malalignment of the left shoulder or humerus.    Radius/Ulna XR,  PA &LAT, left    Narrative    EXAM: XR FOREARM LEFT 2 VIEWS  LOCATION: [facility]  DATE: 1/16/2025    INDICATION: fall, pain  COMPARISON: None.      Impression    IMPRESSION: No displaced radius or ulna fracture. Antecubital fossa IV catheter tubing. Atherosclerotic vascular calcifications.   XR Shoulder Left 2 Views    Narrative    EXAM: LEFT SHOULDER AND HUMERUS 4 VIEWS  LOCATION: [facility]  DATE/TIME: 1/16/2025 2:47 AM CST    INDICATION: Fall. Pain.  COMPARISON: None.      Impression    IMPRESSION: No visualized acute fracture or malalignment of the left shoulder or humerus.    XR Shoulder Right G/E 3 Views    Narrative    4 views right shoulder radiographs 1/16/2025 8:36 AM    History: eval for injury, multiple falls    Comparison: 10/27/2020    Findings:    AP, Grashey, transscapular Y, axillary  views of the right shoulder  were obtained.     No acute osseous abnormality. Acromioclavicular joint is congruent.  Decreased acromiohumeral distance.    Mild degenerative changes of the acromioclavicular joint. No  substantial degenerative change of the glenohumeral joint.  Enthesopathic changes greater tuberosity.    Bones appear osteopenic. Degenerative changes of the spine.    Soft tissue is unremarkable. The visualized lung is clear.      Impression    Impression:  1. No acute osseous abnormality.  2. Decreased acromiohumeral distance, indicative of rotator cuff tear  presence, similar to prior.    ANGELLA HARITHA         SYSTEM ID:  [id number]   CT Head w/o Contrast    Narrative    EXAM: CT HEAD W/O CONTRAST, CT CERVICAL SPINE W/O CONTRAST  LOCATION: [facility]  DATE: 1/17/2025    INDICATION: Head and neck injury  COMPARISON: 1/16/2025  TECHNIQUE:   1) Routine CT Head  without IV contrast. Multiplanar reformats. Dose reduction techniques were used.  2) Routine CT Cervical Spine without IV contrast. Multiplanar reformats. Dose reduction techniques were used.    FINDINGS:   HEAD CT:   INTRACRANIAL CONTENTS: No intracranial hemorrhage, extraaxial collection, or mass effect.  No CT evidence of acute infarct. Moderate presumed chronic small vessel ischemic changes. Moderate generalized volume loss. No hydrocephalus. Small chronic   infarction right basal ganglia.     VISUALIZED ORBITS/SINUSES/MASTOIDS: No intraorbital abnormality. No paranasal sinus mucosal disease. No middle ear or mastoid effusion.    BONES/SOFT TISSUES: No acute abnormality.    CERVICAL SPINE CT:   VERTEBRA: Normal vertebral body heights. Slight anterior subluxation C7 on T1. No fracture or posttraumatic subluxation.     CANAL/FORAMINA: Mild degenerative changes without significant canal narrowing at any level. Multilevel mild-to-moderate neural foraminal narrowing.    PARASPINAL: No extraspinal abnormality. Visualized lung fields are clear.      Impression    IMPRESSION:  HEAD CT:  1.  No acute intracranial process.    CERVICAL SPINE CT:  1.  No definite CT evidence for acute fracture or post traumatic subluxation.   CT Cervical Spine w/o Contrast    Narrative    EXAM: CT HEAD W/O CONTRAST, CT CERVICAL SPINE W/O CONTRAST  LOCATION: [facility]  DATE: 1/17/2025    INDICATION: Head and neck injury  COMPARISON: 1/16/2025  TECHNIQUE:   1) Routine CT Head without IV contrast. Multiplanar reformats. Dose reduction techniques were used.  2) Routine CT Cervical Spine without IV contrast. Multiplanar reformats. Dose reduction techniques were used.    FINDINGS:   HEAD CT:   INTRACRANIAL CONTENTS: No intracranial hemorrhage, extraaxial collection, or mass effect.  No CT evidence of acute infarct. Moderate presumed chronic small vessel ischemic changes. Moderate generalized volume  loss. No hydrocephalus. Small chronic   infarction right basal ganglia.     VISUALIZED ORBITS/SINUSES/MASTOIDS: No intraorbital abnormality. No paranasal sinus mucosal disease. No middle ear or mastoid effusion.    BONES/SOFT TISSUES: No acute abnormality.    CERVICAL SPINE CT:   VERTEBRA: Normal vertebral body heights. Slight anterior subluxation C7 on T1. No fracture or posttraumatic subluxation.     CANAL/FORAMINA: Mild degenerative changes without significant canal narrowing at any level. Multilevel mild-to-moderate neural foraminal narrowing.    PARASPINAL: No extraspinal abnormality. Visualized lung fields are clear.      Impression    IMPRESSION:  HEAD CT:  1.  No acute intracranial process.    CERVICAL SPINE CT:  1.  No definite CT evidence for acute fracture or post traumatic subluxation.   XR Pelvis and Hip Bilateral 2 Views    Narrative    EXAM: XR PELVIS AND HIP BILATERAL 2 VIEWS  LOCATION: [facility]  DATE: 1/20/2025    INDICATION: recent fall, evaluate for fracture  COMPARISON: 1/16/2025      Impression    IMPRESSION: Status post bilateral total hip arthroplasties. No hardware complication. No acute fracture or malalignment. Otherwise unchanged.       Discharge Medications   Current Discharge Medication List        CONTINUE these medications which have CHANGED    Details   insulin glargine (LANTUS PEN) 100 UNIT/ML pen Inject 19 Units subcutaneously at bedtime.  Qty: 30 mL, Refills: 1    Comments: If Lantus is not covered by insurance, may substitute Basaglar or Semglee or other insulin glargine product per insurance preference at same dose and frequency.    Associated Diagnoses: Type 1 diabetes mellitus with other circulatory complication (H)           CONTINUE these medications which have NOT CHANGED    Details   acetaminophen (TYLENOL) 325 MG tablet Take 3 tablets (975 mg) by mouth every 8 hours.    Associated Diagnoses: Closed fracture of one rib of  right side, initial encounter      amLODIPine (NORVASC) 5 MG tablet Take 1 tablet (5 mg) by mouth daily.  Qty: 90 tablet, Refills: 3    Associated Diagnoses: Essential hypertension      aspirin (ASA) 81 MG chewable tablet Take 1 tablet (81 mg) by mouth daily  Qty: 90 tablet, Refills: 3    Associated Diagnoses: Acute CVA (cerebrovascular accident) (H)      atorvastatin (LIPITOR) 40 MG tablet Take 1 tablet (40 mg) by mouth daily.  Qty: 90 tablet, Refills: 3    Associated Diagnoses: Hyperlipidemia LDL goal <70      !! blood glucose (NO BRAND SPECIFIED) test strip Use to test blood sugar 4 times daily or as directed.  Qty: 360 strip, Refills: 3    Associated Diagnoses: Type 1 diabetes mellitus with diabetic polyneuropathy (H)      !! blood glucose (NO BRAND SPECIFIED) test strip Use to test blood sugar 8 times daily or as directed.  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 blood glucose (TRUE METRIX BLOOD GLUCOSE TEST) test strip USE TO TEST BLOOD SUGAR 4 TO 5 TIMES DAILY OR AS DIRECTED  Qty: 400 strip, Refills: 3    Associated Diagnoses: Type 1 diabetes mellitus with other circulatory complication (H)      blood glucose monitoring (NO BRAND SPECIFIED) meter device kit Use to test blood sugar 4 times daily.  Please provide glucose meter that is covered by insurance.  Qty: 1 kit, Refills: 0    Associated Diagnoses: Type 1 diabetes mellitus with other circulatory complication (H); Type 1 diabetes mellitus with diabetic polyneuropathy (H)      blood glucose monitoring (ONE TOUCH ULTRA MINI) meter device kit Use to test blood sugar 4 times daily or as directed.  Qty: 1 kit, Refills: 0    Associated Diagnoses: Type 1 diabetes mellitus with diabetic polyneuropathy (H)      carvedilol (COREG) 12.5 MG tablet Take 1 tablet (12.5 mg) by mouth 2 times daily (with meals).  Qty: 180  tablet, Refills: 3    Associated Diagnoses: Essential hypertension      cyanocobalamin (VITAMIN B-12) 1000 MCG tablet Take 1 tablet (1,000 mcg) by mouth daily  Qty: 100 tablet, Refills: 3    Associated Diagnoses: Vitamin B12 deficiency (non anemic)      gabapentin (NEURONTIN) 100 MG capsule Take 1 capsule (100 mg) by mouth at bedtime.  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 If no response after 15 minutes, additional 1 mg dose from a new device may be injected while waiting for emergency assistance.  Qty: 2 each, Refills: 1    Associated Diagnoses: Type 1 diabetes mellitus with other circulatory complication (H)      insulin aspart (NOVOLOG FLEXPEN) 100 UNIT/ML pen Inject 2 units with meals plus correction scale additional three times daily before meals  as follows  Pre-Meal  - 200 give additional 1 unit.  For Pre-Meal  -250 give 2 additional units.  For Pre-Meal -300 give 3 additional units.  For Pre-Meal -350 give 4 additional units.  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  Qty: 180 tablet, Refills: 3    Associated Diagnoses: Seizures (H)      loratadine (CLARITIN) 10 MG tablet Take 1 tablet (10 mg) by mouth daily.  Qty: 90 tablet,  Refills: 3    Associated Diagnoses: Dermatitis      methocarbamol (ROBAXIN) 500 MG tablet Take 1 tablet (500 mg) by mouth daily as needed for muscle spasms.  Qty: 30 tablet, Refills: 0    Associated Diagnoses: Closed fracture of one rib of right side, initial encounter      PENTIPS 32G X 4 MM miscellaneous Inject 1 each subcutaneously daily. Use 4 pen needles daily or as directed.      SYNTHROID 75 MCG tablet Take 1 tablet (75 mcg) by mouth daily.  Qty: 90 tablet, Refills: 3    Associated Diagnoses: Acquired hypothyroidism      zinc oxide (DESITIN) 20 % external ointment Apply topically as needed for dry skin or irritation  Qty: 85 g, Refills: 3    Associated Diagnoses: Fecal smearing      DULoxetine (CYMBALTA) 20 MG capsule TAKE 1 CAPSULE(20 MG) BY MOUTH DAILY  Qty: 90 capsule, Refills: 3    Associated Diagnoses: Fibromyalgia       !! - Potential duplicate medications found. Please discuss with provider.        STOP taking these medications       Lidocaine (LIDOCARE) 4 % Patch Comments:   Reason for Stopping:             Allergies   Allergies   Allergen Reactions    Seasonal Allergies

## 2025-01-21 NOTE — PROGRESS NOTES
Inpatient Diabetes Management Service: Daily Progress Note     HPI: Isabell Matthews is a 64 year old woman with history of HTN, HLD, CKD IIIb, T1DM c/b neuropathy, ischemic and hemorrhagic strokes (right basal ganglia ischemic infarct in 2018, left basal ganglia hemorrhagic stroke 2021, right centrum semiovale & right chuck 2023) with residual R facial droop and vascular dementia, seizures, and recurrent UTI admitted with weakness in the setting of covid infection.  She was admitted for safe disposition planning.  PT now recommends discharge home.  Admission complicated by hyperglycemia and hypertension.          Assessment/Plan:     Assessment:   Most likely Type 1 versus Type 2  Diabetes Mellitus with DEMETRIA elevated at 6.2 and C-peptide=<0.1 on 6/17/2024, complicated by peripheral neuropathy, nephropathy, hx of DKA and hypoglycemia. Sub-optimal control, last A1c 10.2% (5/20/2024)- now 9.6 % on 1/16/25  Dementia - daughter is caregiver and manages diabetes for patient.   COVID  Possible stress induced hyperglycemia    Plan/Recommendations:   - Lantus 20 --> 17 units q 24 hrs at 2200  - Continue Novolog Meal Coverage: 1 unit per 20 grams CHO, TID AC and PRN with snacks/supplements  - Adjust Novolog Correction Scale: Medium resistance (ISF: 50) TID AC  and Custom Resistance (ISF 75) >200 HS / 0200   -Repeat c-peptide when BG come down   - BG Monitoring: TID AC, HS, 0200  - Hypoglycemia protocol  - Carb counting protocol     Per ADA guidelines, treatment goals and recommendations for older adults with DM and medically complexity is less stringent BG control/A1c for safety - targets of 110-180 mg/dL and up to 250 mg/dL reasonable.  Avoid reliance on A1c.  Glucose decisions should be based on avoidance of hypoglycemia and symptomatic hyperglycemia.     Discussion:   Glucose down to 63 this morning. Appears to have eaten late dinner/snack around 1900, then BG checked at 2200 and was 345 with 3 units of  correction given per order parameters. Also received 1 unit at 0200 for BG of 205. Will adjust overnight ICR to ISF 75 from 50, though suspect low likely related to post-prandial BG correction. Will also decrease Lantus for safety. Given dementia and risks of hypoglycemia will continue conservative dosing with plan to likely discharge back on home regimen if stable     Carried forward.   Patient admitted for weakness and falls, found to be COVID +. Dementia at baseline and daughter is primary caregiver. Daughter feels PTA insulin regimen is doing well for patient. A1c dropped from 11.2 to 9.6. With patient's dementia feel that tight control to 7% will results in lows and be more harmful.     Discharge Planning: (tentative)  Medications: TBD    Test Claims:  none needed.   Education:  Needs to be assessed closer to discharge but daughter gives all her insulin due to dementia.  She gives fixed meal dose short acting and uses a correction scale  Outpatient Follow-up:  recommend  PCP     Please notify Inpatient Diabetes Service if changes are planned to steroids, nutrition, TPN/TF and anticipated procedures requiring prolonged NPO status.         Interval History/Review of Systems :   The last 24 hours progress and nursing notes reviewed.  Awake in room. No acute distress, requests to be boosted up in bed. States she ate breakfast, but no carb coverage given due to low BG. Had eggs and kamara per recollection.     Confirms daughter is primary caregiver and administers insulin and had stated PTA regimen was working well.     Planned Procedures/Surgeries: none    Inpatient Glucose Control:       Recent Labs   Lab 01/21/25  0145 01/20/25  2200 01/20/25  1718 01/20/25  1204 01/20/25  0623 01/19/25  2152   * 345* 202* 322* 258* 397*             Medications Impacting Glycemia:   Steroids: none  D5W containing solutions/medications: none  Other medications impacting glucose: none        Nutrition:   Orders Placed This  Encounter      Regular Diet Adult    Supplements:  none  TF: none  TPN: none         Diabetes History: see full consult note for complete diabetes history   Diabetes Type and Duration:  DM for > 40 years, diagnosed in her 40s. Per daughter,  initially told she has T2DM then T1DM for a while then flipped back to being told T2DM.      6/17/2024 positive GAD65 antibody, and c-peptide <0.1 but BG elevated and need to be repeated      PTA Medication Regimen: Lantus 19 units with 2 units per meal but not fixed meal dose at day program and 1/50 correction scale that started at 150.  IF she eats then give correction + meal dose.  If bg <150 does not always give fixed meal dose  Missing doses?: none  Historical Diabetes Medications: none     Glucose monitoring device/frequency/trends:  glucometer before meals and at bedtime. Has tried and failed multiple CGMs (issues with accuracy or patient picking CGM off).   Hypoglycemia PTA:   - Frequency: 1-2 times per month, better since discharged in 6/2024  - Severity: Hx of severe low a few months ago 2/2 patient grabbing Novolog and injecting 20 units (daughter now keeps insulin out of patient's reach)   -  Outpatient Diabetes Provider:  PCP - Bony Castaneda   Formal Diabetes Education/Educator: no        Physical Exam:   BP (!) 157/72 (BP Location: Right arm)   Pulse 82   Temp 98.1  F (36.7  C) (Oral)   Resp 16   Wt 60.9 kg (134 lb 4.2 oz)   SpO2 96%   BMI 23.79 kg/m    General Appearance:  No acute distress, resting comfortably  Alert and interactive, Confused  HEENT: Normocephalic, atraumatic.   Lungs:  Breathing comfortably  Abdomen: Round, nondistended.  Extremities:  No significant  lower extremity edema.   Psych:  Calm, appropriate mood and affect.           Data:     Lab Results   Component Value Date    A1C 9.6 (H) 01/16/2025    A1C 11.2 (H) 09/23/2024    A1C 10.2 (H) 05/20/2024    A1C 10.7 (H) 01/29/2024    A1C 9.3 (H) 09/04/2023    A1C 7.8 (H) 06/21/2021     A1C 11.7 (H) 09/27/2020       ROUTINE IP LABS (Last four results)  BMP  Recent Labs   Lab 01/21/25  0145 01/20/25  2200 01/20/25  1718 01/20/25  1204 01/19/25  0758 01/19/25  0737 01/17/25  0803 01/17/25  0718 01/16/25  1003 01/16/25  0932 01/16/25  0010   NA  --   --   --   --   --   --   --  141  --   --  142   POTASSIUM  --   --   --   --   --   --   --  4.0  --   --  4.0   CHLORIDE  --   --   --   --   --   --   --  108*  --   --  107   VERONICA  --   --   --   --   --   --   --  8.7*  --   --  9.1   CO2  --   --   --   --   --   --   --  24  --   --  25   BUN  --   --   --   --   --   --   --  33.1*  --   --  37.5*   CR  --   --   --   --   --  1.52*  --  1.51*  --  1.61* 1.40*   * 345* 202* 322*   < >  --    < > 151*   < >  --  184*    < > = values in this interval not displayed.     CBC  Recent Labs   Lab 01/19/25  0737 01/17/25  0718 01/16/25  0010   WBC  --  4.9 4.5   RBC  --  3.74* 4.06   HGB  --  10.6* 11.4*   HCT  --  32.2* 35.7   MCV  --  86 88   MCH  --  28.3 28.1   MCHC  --  32.9 31.9   RDW  --  15.2* 15.3*   * 162 183     INR  Recent Labs   Lab 01/16/25  0010   INR 0.96       Inpatient Diabetes Service will continue to follow, please don't hesitate to contact the team with any questions or concerns.     PEGGY Hill, CNP   Inpatient Diabetes Management Service   Pager: [phone]   Available on Vocera      Plan discussed with patient, bedside RN, and primary team via this note.    To contact Inpatient Diabetes Service:     7 AM - 5 PM: Page the IDS BULMARO following the patient that day (see filed or incomplete progress notes/consult notes under Endocrinology)    OR if uncertain of provider assignment: page job code [phone]    5 PM - 7 AM: First call after hours is to primary service.    For urgent after-hours questions, page job code for on call fellow: [phone]     I spent a total of 35 minutes on the date of the encounter doing prep/post-work, chart review, history and exam, documentation  and further activities per the note including lab review, multidisciplinary communication, counseling the patient and/or coordinating care regarding acute hyper/hypoglycemic management, as well as discharge management and planning/communication.

## 2025-01-21 NOTE — PLAN OF CARE
"Goal Outcome Evaluation:  BP (!) 170/72   Pulse 79   Temp 97.9  F (36.6  C) (Oral)   Resp 16   Wt 60.9 kg (134 lb 4.2 oz)   SpO2 97%   BMI 23.79 kg/m    No acute changes during shift, continues 1:1 for safety, safety measures in place, call light within reach, plan of care continues.  Problem: Adult Inpatient Plan of Care  Goal: Plan of Care Review  Description: The Plan of Care Review/Shift note should be completed every shift.  The Outcome Evaluation is a brief statement about your assessment that the patient is improving, declining, or no change.  This information will be displayed automatically on your shift  note.  Outcome: Progressing  Flowsheets (Taken 1/20/2025 1853)  Plan of Care Reviewed With: patient  Overall Patient Progress: no change  Goal: Patient-Specific Goal (Individualized)  Description: You can add care plan individualizations to a care plan. Examples of Individualization might be:  \"Parent requests to be called daily at 9am for status\", \"I have a hard time hearing out of my right ear\", or \"Do not touch me to wake me up as it startles  me\".  Outcome: Progressing  Goal: Absence of Hospital-Acquired Illness or Injury  Outcome: Progressing  Intervention: Identify and Manage Fall Risk  Recent Flowsheet Documentation  Taken 1/20/2025 0929 by Tisha Powers RN  Safety Promotion/Fall Prevention: bedside attendant  Intervention: Prevent Skin Injury  Recent Flowsheet Documentation  Taken 1/20/2025 0929 by Tisha Powers RN  Body Position: position changed independently  Skin Protection: incontinence pads utilized  Intervention: Prevent and Manage VTE (Venous Thromboembolism) Risk  Recent Flowsheet Documentation  Taken 1/20/2025 0929 by Tisha Powers RN  VTE Prevention/Management: SCDs off (sequential compression devices)  Intervention: Prevent Infection  Recent Flowsheet Documentation  Taken 1/20/2025 0929 by Tisha Powers RN  Infection Prevention:   personal protective " equipment utilized   hand hygiene promoted   equipment surfaces disinfected   rest/sleep promoted   single patient room provided  Goal: Optimal Comfort and Wellbeing  Outcome: Progressing  Intervention: Provide Person-Centered Care  Recent Flowsheet Documentation  Taken 1/20/2025 0929 by Tisha Powers RN  Trust Relationship/Rapport:   care explained   choices provided   thoughts/feelings acknowledged  Goal: Readiness for Transition of Care  Outcome: Progressing     Problem: Fall Injury Risk  Goal: Absence of Fall and Fall-Related Injury  Outcome: Progressing  Intervention: Identify and Manage Contributors  Recent Flowsheet Documentation  Taken 1/20/2025 0929 by Tisha Powers RN  Self-Care Promotion:   independence encouraged   meal set-up provided  Medication Review/Management: medications reviewed  Intervention: Promote Injury-Free Environment  Recent Flowsheet Documentation  Taken 1/20/2025 0929 by Tisha Powers RN  Safety Promotion/Fall Prevention: bedside attendant     Problem: Comorbidity Management  Goal: Blood Glucose Levels Within Targeted Range  Outcome: Progressing  Intervention: Monitor and Manage Glycemia  Recent Flowsheet Documentation  Taken 1/20/2025 0929 by Tisha Powers RN  Medication Review/Management: medications reviewed  Goal: Blood Pressure in Desired Range  Outcome: Progressing  Intervention: Maintain Blood Pressure Management  Recent Flowsheet Documentation  Taken 1/20/2025 0929 by Tisha Powers RN  Medication Review/Management: medications reviewed  Goal: Maintenance of Seizure Control  Outcome: Progressing  Intervention: Maintain Seizure Symptom Control  Recent Flowsheet Documentation  Taken 1/20/2025 0929 by Tisha Powers RN  Sensory Stimulation Regulation:   care clustered   lighting decreased   television on  Medication Review/Management: medications reviewed     Problem: Infection  Goal: Absence of Infection Signs and Symptoms  Outcome:  Progressing  Intervention: Prevent or Manage Infection  Recent Flowsheet Documentation  Taken 1/20/2025 0929 by Tisha Powers, RN  Isolation Precautions: special precautions maintained     Problem: Pain Acute  Goal: Optimal Pain Control and Function  Outcome: Progressing  Intervention: Optimize Psychosocial Wellbeing  Recent Flowsheet Documentation  Taken 1/20/2025 0929 by Tisha Powers RN  Diversional Activities: television  Intervention: Prevent or Manage Pain  Recent Flowsheet Documentation  Taken 1/20/2025 0929 by Tisha Powers RN  Sensory Stimulation Regulation:   care clustered   lighting decreased   television on  Medication Review/Management: medications reviewed         Plan of Care Reviewed With: patient    Overall Patient Progress: no changeOverall Patient Progress: no change

## 2025-01-21 NOTE — PLAN OF CARE
Problem: Adult Inpatient Plan of Care  Goal: Absence of Hospital-Acquired Illness or Injury  Intervention: Prevent Infection  Recent Flowsheet Documentation  Taken 1/21/2025 1031 by Robbi Alfaro, RN  Infection Prevention:   hand hygiene promoted   equipment surfaces disinfected     Problem: Infection  Goal: Absence of Infection Signs and Symptoms  Outcome: Not Progressing  Intervention: Prevent or Manage Infection  Recent Flowsheet Documentation  Taken 1/21/2025 1031 by Robbi Alfaro, RN  Isolation Precautions: special precautions maintained    VS:  Temp: 97.4  F (36.3  C) Temp src: Oral BP: (!) 144/79 Pulse: 80   Resp: 17 SpO2: 95 % O2 Device: None (Room air)       O2: SpO2 > 95 and stable on RA. LS clear and equal bilaterally. Denies chest pain and SOB.    Output: Incontinent    Last BM: incontinent, denies abdominal discomfort. BS active / passing flatus.    Activity:  Assist of 1 -2    Skin: WDL   Pain:  0/10    CMS:  Intermittent confusion    Dressing:  None    Diet: Regular diet. Denies nausea/vomiting.   Breakfast sugar at 117   LDA: No PIV access.   Equipment: IV pole, personal belongings, monitor   Plan: SPECIAL precautions maintained / Continue with plan of care. Call light within reach  Plan: rehab   Additional Info:  Total feed assistance - patient keeps her food in the cheeks   BED ALARM ON

## 2025-01-21 NOTE — PLAN OF CARE
Physical Therapy Discharge Summary    Reason for therapy discharge:    Discharged to home with home therapy.    Progress towards therapy goal(s). See goals on Care Plan in Psychiatric electronic health record for goal details.  Goals not met.  Barriers to achieving goals:   discharge from facility.    Therapy recommendation(s):    Continued therapy is recommended.  Rationale/Recommendations:  Home PT follow up recommended to progress & optimize functional mobility and independence to reduce risk of falls and reduce caregiver burden. Pt needs CGA- SBA for all mobility with walker currently, daughter endorsed ability to provide this.

## 2025-01-22 ENCOUNTER — PATIENT OUTREACH (OUTPATIENT)
Dept: CARE COORDINATION | Facility: CLINIC | Age: 65
End: 2025-01-22
Payer: COMMERCIAL
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
## 2025-01-22 NOTE — PROGRESS NOTES
Clinic Care Coordination Contact    Situation: Patient chart reviewed by care coordinator.    Background: CHW tried calling [facility] Karla Burkett [phone] to know if if patient is getting CC from them and if the patient has any further needs. Karla is out of the office until tomorrow 1/23/25.    Plan/Recommendations: CHW will try to follow up with Karla back tomorrow 1/23/25 to talk to her regarding patients needs.    Florence KOENIG Community Health Worker  Clinic Care Coordination  [facility]   Clinic #: [phone]  Direct #: [phone]

## 2025-01-23 NOTE — PROGRESS NOTES
"Clinic Care Coordination Contact  Community Health Worker Follow Up    Care Gaps:     Health Maintenance Due   Topic Date Due    PARATHYROID  Never done    EYE EXAM  Never done    COLORECTAL CANCER SCREENING  Never done    DTAP/TDAP/TD IMMUNIZATION (1 - Tdap) Never done    ZOSTER IMMUNIZATION (1 of 2) Never done    MAMMO SCREENING  10/07/2015    RSV VACCINE (1 - Risk 60-74 years 1-dose series) Never done    DIABETIC FOOT EXAM  11/02/2021    MEDICARE ANNUAL WELLNESS VISIT  Never done    MICROALBUMIN  07/29/2024    FALL RISK ASSESSMENT  01/29/2025       Scheduled 02/13/2025 PCP APPT      Care Plan: No active care plan      Intervention and Education during outreach: CHW talked to [facility] Karla Kwadwo and she stated \"All needs are being met by daughter with Consumer directed care - Authorized by Care Coordinator. Care coordinator doesn't think another person needs to get involved.\"    CHW Plan: Please review the chart and confirm if patient is okay to close.    Florence KOENIG Community Health Worker  Clinic Care Coordination  [facility]   Clinic #: [phone]  Direct #: [phone]        "

## 2025-01-23 NOTE — PROGRESS NOTES
RN closed program due to duplication of care management from [facility] Care Coordinator- please see CHW assessment. RN will send to  as well for FYI of TCM call.     JESSICA MCCABE RN on 1/23/2025 at 11:14 AM

## 2025-01-26 ENCOUNTER — MEDICAL CORRESPONDENCE (OUTPATIENT)
Dept: HEALTH INFORMATION MANAGEMENT | Facility: CLINIC | Age: 65
End: 2025-01-26

## 2025-01-27 ENCOUNTER — TELEPHONE (OUTPATIENT)
Dept: FAMILY MEDICINE | Facility: CLINIC | Age: 65
End: 2025-01-27
Payer: COMMERCIAL

## 2025-01-27 DIAGNOSIS — R30.0 DYSURIA: Primary | ICD-10-CM

## 2025-01-27 NOTE — TELEPHONE ENCOUNTER
Diagnoses and all orders for this visit:    Neuropathy  -     pregabalin (LYRICA) 75 MG capsule; TAKE 1 CAPSULE(75 MG) BY MOUTH TWICE DAILY    Fibromyalgia  -     pregabalin (LYRICA) 75 MG capsule; TAKE 1 CAPSULE(75 MG) BY MOUTH TWICE DAILY    I reviewed chart she will require a follow-up in 1-2 months.  Best wishes,  Bony Castaneda MD  
LVM for daughter Nehal w/ pcc number to schedule 1-2mo follow up w/ pcp per provider request.  
No

## 2025-01-27 NOTE — TELEPHONE ENCOUNTER
Left a detailed message to Bela, home care nurse for verbal auth of orders. Also she can collect the urine sample and bring it to any [facility] clinic.

## 2025-01-27 NOTE — TELEPHONE ENCOUNTER
[facility]    Phone Message    May a detailed message be left on voicemail: yes     Reason for Call: Order(s): Home Care Orders: Physical Therapy (PT): eval, Occupational Therapy (OT): eval, and Skilled Nursing: twice a week for two weeks, once a week for seven weeks, three as needed visits for diabetes, skin assessments and possible UTI    Action Taken: Message routed to:  [facility]    Travel Screening: Not Applicable     Date of Service:        [facility]    Phone Message    May a detailed message be left on voicemail: yes     Reason for Call: Symptoms or Concerns     If patient has red-flag symptoms, warm transfer to triage line    Current symptom or concern: UTI, lower abdominal pain, cloudy urine, increased confusion and weakness    Symptoms have been present for:  1-2 day(s)    Has patient previously been seen for this? Yes    By : last hospital stay        Are there any new or worsening symptoms? Yes: see above, requesting orders for UAUC or if recently done, requesting antiobotics    Action Taken: Message routed to:  [facility]    Travel Screening: Not Applicable     Date of Service:

## 2025-01-28 ENCOUNTER — DOCUMENTATION ONLY (OUTPATIENT)
Dept: FAMILY MEDICINE | Facility: CLINIC | Age: 65
End: 2025-01-28
Payer: COMMERCIAL

## 2025-01-28 NOTE — PROGRESS NOTES
Type of Form Received: [facility] Client Coordination Note POC Approval PT/OT Evals    Form Received (Date) 1/27/25   Form Filled out Yes, faxed 1/30   Placed in provider folder Yes

## 2025-01-29 ENCOUNTER — PATIENT OUTREACH (OUTPATIENT)
Dept: CARE COORDINATION | Facility: CLINIC | Age: 65
End: 2025-01-29
Payer: COMMERCIAL

## 2025-01-29 ENCOUNTER — LAB (OUTPATIENT)
Dept: LAB | Facility: CLINIC | Age: 65
End: 2025-01-29
Payer: COMMERCIAL

## 2025-01-29 DIAGNOSIS — R30.0 DYSURIA: ICD-10-CM

## 2025-01-29 LAB
ALBUMIN UR-MCNC: 30 MG/DL
APPEARANCE UR: ABNORMAL
BACTERIA #/AREA URNS HPF: ABNORMAL /HPF
BILIRUB UR QL STRIP: NEGATIVE
COLOR UR AUTO: YELLOW
GLUCOSE UR STRIP-MCNC: NEGATIVE MG/DL
HGB UR QL STRIP: NEGATIVE
KETONES UR STRIP-MCNC: NEGATIVE MG/DL
LEUKOCYTE ESTERASE UR QL STRIP: NEGATIVE
NITRATE UR QL: NEGATIVE
PH UR STRIP: 5.5 [PH] (ref 5–7)
RBC #/AREA URNS AUTO: ABNORMAL /HPF
SP GR UR STRIP: 1.01 (ref 1–1.03)
SQUAMOUS #/AREA URNS AUTO: ABNORMAL /LPF
UROBILINOGEN UR STRIP-ACNC: 0.2 E.U./DL
WBC #/AREA URNS AUTO: ABNORMAL /HPF

## 2025-01-29 PROCEDURE — 81001 URINALYSIS AUTO W/SCOPE: CPT

## 2025-01-29 NOTE — PROGRESS NOTES
Clinic Care Coordination Contact  Transitions of Care Outreach  Chief Complaint   Patient presents with    Clinic Care Coordination - [facility]       Most Recent Admission Date: 1/15/2025   Most Recent Admission Diagnosis: COVID-19 - U07.1     Most Recent Discharge Date: 1/21/2025   Most Recent Discharge Diagnosis: COVID-19 - U07.1  Repeated falls - R29.6  History of stroke without residual deficits - Z86.73  Type 1 diabetes mellitus with other circulatory complication (H) - E10.59     Transitions of Care Assessment    Discharge Assessment  How are you doing now that you are home?: Doing Ok. Has home RN, PT, OT  How are your symptoms? (Red Flag symptoms escalate to triage hotline per guidelines): Worsening  Do you know how to contact your clinic care team if you have future questions or changes to your health status? : Yes  Does the patient have their discharge instructions? : Yes  Does the patient have questions regarding their discharge instructions? : No  Were you started on any new medications or were there changes to any of your previous medications? : No  Does the patient have all of their medications?: Yes  Do you have questions regarding any of your medications? : No  Do you have all of your needed medical supplies or equipment (DME)?  (i.e. oxygen tank, CPAP, cane, etc.): Yes    Care Management       Care Mgmt General Assessment         Care Mgmt Encounter Assessment     Follow up Plan     Discharge Follow-Up  Discharge follow up appointment scheduled in alignment with recommended follow up timeframe or Transitions of Risk Category? (Low = within 30 days; Moderate= within 14 days; High= within 7 days): Yes  Discharge Follow Up Appointment Date: 02/13/25  Discharge Follow Up Appointment Scheduled with?: Primary Care Provider    Future Appointments   Date Time Provider Department Center   2/13/2025  6:30 PM Ronnie Kellogg MD [facility]   10/20/2025  3:00 PM Marshall Medina MD [facility]        Outpatient Plan as outlined on AVS reviewed with patient.    For any urgent concerns, please contact our 24 hour nurse triage line: [phone] ([phone])       Spoke with Patient's daughter, Vishal Peña, on the phone today. Vishal reported she feels much more capable of caring for Patient since there is a RN, PT, and OT coming out to the home. OT will be helping install grab bars in the bathroom. Vishal does not feel a sooner appointment (currently scheduled for 2/13 with PCP) is needed as they have skilled nursing in the home. Vishal knows how to get ahold of the clinic if things change.       NANCY SegalSW

## 2025-01-30 LAB — BACTERIA UR CULT: NORMAL

## 2025-02-01 LAB — BACTERIA UR CULT: ABNORMAL

## 2025-02-03 ENCOUNTER — TELEPHONE (OUTPATIENT)
Dept: INTERNAL MEDICINE | Facility: CLINIC | Age: 65
End: 2025-02-03
Payer: COMMERCIAL

## 2025-02-03 DIAGNOSIS — N39.0 URINARY TRACT INFECTION: Primary | ICD-10-CM

## 2025-02-03 RX ORDER — NITROFURANTOIN 25; 75 MG/1; MG/1
100 CAPSULE ORAL 2 TIMES DAILY
Qty: 14 CAPSULE | Refills: 0 | Status: SHIPPED | OUTPATIENT
Start: 2025-02-03 | End: 2025-02-10

## 2025-02-03 NOTE — TELEPHONE ENCOUNTER
Spoke with pt's daughter, Maria Guadalupe (a care giver) and informed the result/recommendations.    Soon-Mi  -----------------------------------------------------------------------      ----- Message from Ronnie Kellogg sent at 2/3/2025  2:30 PM CST -----  Urine culture: urine infection  Macrobid 100 mg one po bid ok 14 pills no refills should cover can you send in for bladder infection  Can you call; if any fever nausea vomit or full body symptoms let me know

## 2025-02-13 ENCOUNTER — OFFICE VISIT (OUTPATIENT)
Dept: FAMILY MEDICINE | Facility: CLINIC | Age: 65
End: 2025-02-13
Payer: COMMERCIAL

## 2025-02-13 VITALS
RESPIRATION RATE: 18 BRPM | WEIGHT: 132.2 LBS | SYSTOLIC BLOOD PRESSURE: 155 MMHG | BODY MASS INDEX: 23.42 KG/M2 | HEIGHT: 63 IN | DIASTOLIC BLOOD PRESSURE: 86 MMHG | HEART RATE: 74 BPM | OXYGEN SATURATION: 99 % | TEMPERATURE: 98 F

## 2025-02-13 DIAGNOSIS — R29.6 REPEATED FALLS: Primary | ICD-10-CM

## 2025-02-13 DIAGNOSIS — N18.9 CHRONIC KIDNEY DISEASE, UNSPECIFIED CKD STAGE: ICD-10-CM

## 2025-02-13 DIAGNOSIS — K05.10 GINGIVITIS: ICD-10-CM

## 2025-02-13 DIAGNOSIS — E10.59 TYPE 1 DIABETES MELLITUS WITH OTHER CIRCULATORY COMPLICATION (H): ICD-10-CM

## 2025-02-13 RX ORDER — CHLORHEXIDINE GLUCONATE ORAL RINSE 1.2 MG/ML
30 SOLUTION DENTAL DAILY
Qty: 473 ML | Refills: 3 | Status: SHIPPED | OUTPATIENT
Start: 2025-02-13

## 2025-02-14 NOTE — PROGRESS NOTES
Due to the patient's arrival time, assigned questionnaires were unable to be completed.     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  - Adult Eye  Referral; Future    Gingivitis  Dtr looking for dentist this helps when gums sore  - chlorhexidine (PERIDEX) 0.12 % solution; Take 30 mLs by mouth daily. Swish and spit 30 mL once daily for 14 days.Swish and spit 30 mL once daily for 14 days.    The longitudinal plan of care for the diagnosis(es)/condition(s) as documented were addressed during this visit. Due to the added complexity in care, I will continue to support Isabell in the subsequent management and with ongoing continuity of care.  33 minutes spent by me on the date of the encounter doing chart review, history and exam, documentation and further activities per the note    MED REC REQUIRED  Post Medication Reconciliation Status:         No follow-ups on file.    Subjective   Isabell is a 64 year old, presenting for the following health issues:  Follow Up and Hospital F/U      2/13/2025     6:35 PM   Additional Questions   Roomed by KTR   Accompanied by Vishal (DAUGHTER)     HPI   Here w/ dtr  Stable  Energy better after dx/rx uti  Home bp wnl, up a bit here, they'll mychart a home list soon  No acute c/w  Rvwd recent inpt stay  Past Medical History:   Diagnosis Date    Chronic pain     Coronary atherosclerosis 6/14/2016    Essential hypertension     Ex-cigarette smoker     quit 7/2018 over 35 years    Ex-cigarette smoker     Hyperlipidemia     Hypothyroid     Seizures (H)     Stroke (H)     Vascular dementia (H)       Past Surgical History:   Procedure Laterality Date    athroplasty hip Bilateral     2003, 2006    OTHER SURGICAL HISTORY      athroplasty hip    PICC DOUBLE LUMEN PLACEMENT Right 06/11/2023    right basilic 5 fr dl power picc 39 cm    STENT       Current Outpatient Medications   Medication Sig Dispense Refill    acetaminophen (TYLENOL) 325 MG tablet Take 3 tablets (975 mg) by mouth every 8 hours.      aspirin (ASA) 81 MG chewable tablet Take 1 tablet (81 mg) by mouth daily 90 tablet 3    atorvastatin (LIPITOR) 40 MG tablet Take 1 tablet (40 mg) by mouth daily. 90 tablet 3    blood glucose (NO BRAND SPECIFIED) test strip Use to test blood sugar 4 times daily or as directed. 360 strip 3    blood glucose (NO BRAND SPECIFIED) test strip Use to test blood sugar 8 times daily or as directed. 700 strip 3    blood glucose (TRUE METRIX BLOOD GLUCOSE TEST) test strip USE TO TEST BLOOD SUGAR 4 TO 5 TIMES DAILY OR AS DIRECTED 400 strip 3    blood glucose monitoring (NO BRAND SPECIFIED) meter device kit Use to test blood sugar 4 times daily.  Please provide glucose meter that is covered by insurance. 1 kit 0    blood glucose monitoring (ONE TOUCH ULTRA MINI) meter device kit Use to test blood sugar 4 times daily or as directed. 1 kit 0    carvedilol (COREG) 12.5 MG tablet Take 1 tablet (12.5 mg) by mouth 2 times daily (with meals). 180 tablet 3    chlorhexidine (PERIDEX) 0.12 % solution Take 30 mLs by mouth daily. Swish and spit 30 mL once daily for 14 days.Swish and spit 30 mL once daily for 14 days. 473 mL 3    Continuous Glucose Sensor (FREESTYLE MARIA FERNANDA 2 SENSOR) MISC Change every 14 days. 4 each 11    cyanocobalamin (VITAMIN B-12) 1000 MCG tablet Take 1 tablet (1,000 mcg) by mouth daily 100 tablet 3    DULoxetine (CYMBALTA) 20 MG capsule TAKE 1 CAPSULE(20 MG) BY MOUTH DAILY 90 capsule 3    gabapentin (NEURONTIN) 100 MG capsule Take 1 capsule (100 mg) by mouth at bedtime. 90 capsule 3    Glucagon (GVOKE HYPOPEN) 1 MG/0.2ML  pen Inject the contents of 1 device under the skin into lower abdomen, outer thigh, or outer upper arm as needed for hypoglycemia. If no response after 15 minutes, additional 1 mg dose from a new device may be injected while waiting for emergency assistance. 2 each 1    insulin aspart (NOVOLOG FLEXPEN) 100 UNIT/ML pen Inject 2 units with meals plus correction scale additional three times daily before meals  as follows  Pre-Meal  - 200 give additional 1 unit.  For Pre-Meal  -250 give 2 additional units.  For Pre-Meal -300 give 3 additional units.  For Pre-Meal -350 give 4 additional units.  For Pre-Meal -400 give 5 additional units Max 25 units daily 30 mL 4    insulin aspart (NOVOLOG PENFILL) 100 UNIT/ML cartridge Inject 2-4 Units Subcutaneous 4 times daily (with meals and nightly) diabetes 15 mL 11    insulin glargine (LANTUS PEN) 100 UNIT/ML pen Inject 19 Units subcutaneously at bedtime. 30 mL 1    levETIRAcetam (KEPPRA) 500 MG tablet Take 1 tablet (500 mg) by mouth 2 times daily. 180 tablet 3    loratadine (CLARITIN) 10 MG tablet Take 1 tablet (10 mg) by mouth daily. 90 tablet 3    methocarbamol (ROBAXIN) 500 MG tablet Take 1 tablet (500 mg) by mouth daily as needed for muscle spasms. 30 tablet 0    PENTIPS 32G X 4 MM miscellaneous Inject 1 each subcutaneously daily. Use 4 pen needles daily or as directed.      SYNTHROID 75 MCG tablet Take 1 tablet (75 mcg) by mouth daily. 90 tablet 3    zinc oxide (DESITIN) 20 % external ointment Apply topically as needed for dry skin or irritation 85 g 3    amLODIPine (NORVASC) 5 MG tablet Take 1 tablet (5 mg) by mouth daily. (Patient not taking: Reported on 2/13/2025) 90 tablet 3     No current facility-administered medications for this visit.     Allergies   Allergen Reactions    Seasonal Allergies      Family History   Problem Relation Age of Onset    Acute Myocardial Infarction Father     Heart Disease Maternal Grandmother     Dementia Maternal  Grandmother     Myocardial Infarction Father     Dementia Paternal Grandmother     Heart Disease Paternal Grandmother      Social History     Socioeconomic History    Marital status:      Spouse name: Not on file    Number of children: Not on file    Years of education: Not on file    Highest education level: Not on file   Occupational History    Not on file   Tobacco Use    Smoking status: Former     Current packs/day: 0.00     Average packs/day: 0.5 packs/day for 35.0 years (17.5 ttl pk-yrs)     Types: Cigarettes     Start date: 1983     Quit date: 2018     Years since quittin.6    Smokeless tobacco: Never   Substance and Sexual Activity    Alcohol use: Not Currently    Drug use: Not Currently    Sexual activity: Not Currently   Other Topics Concern    Parent/sibling w/ CABG, MI or angioplasty before 65F 55M? Not Asked   Social History Narrative    ** Merged History Encounter **         Recently moved to [facility] with Daughter Charli who is her pca     Social Drivers of Health     Financial Resource Strain: Unknown (2024)    Financial Resource Strain     Within the past 12 months, have you or your family members you live with been unable to get utilities (heat, electricity) when it was really needed?: Patient unable to answer   Food Insecurity: Low Risk  (2024)    Food Insecurity     Within the past 12 months, did you worry that your food would run out before you got money to buy more?: No     Within the past 12 months, did the food you bought just not last and you didn t have money to get more?: No   Transportation Needs: Unknown (2024)    Transportation Needs     Within the past 12 months, has lack of transportation kept you from medical appointments, getting your medicines, non-medical meetings or appointments, work, or from getting things that you need?: Patient unable to answer   Physical Activity: Not on file   Stress: Not on file   Social Connections: Not on file  "  Interpersonal Safety: Low Risk  (8/25/2024)    Interpersonal Safety     Do you feel physically and emotionally safe where you currently live?: Yes     Within the past 12 months, have you been hit, slapped, kicked or otherwise physically hurt by someone?: No     Within the past 12 months, have you been humiliated or emotionally abused in other ways by your partner or ex-partner?: No   Housing Stability: Unknown (8/25/2024)    Housing Stability     Do you have housing? : Patient unable to answer     Are you worried about losing your housing?: Patient unable to answer           Objective    BP (!) 159/83 (BP Location: Right arm, Patient Position: Sitting, Cuff Size: Adult Regular)   Pulse 74   Temp 98  F (36.7  C) (Oral)   Resp 18   Ht 1.6 m (5' 2.99\")   Wt 60 kg (132 lb 3.2 oz)   SpO2 99%   BMI 23.43 kg/m    Body mass index is 23.43 kg/m .  Physical Exam   GENERAL: alert and no distress  In wheelchair  Speaks but infrequent, chronic pattern    MS: no gross musculoskeletal defects noted, no edema            Signed Electronically by: Ronnie Kellogg MD    "

## 2025-02-15 ENCOUNTER — HEALTH MAINTENANCE LETTER (OUTPATIENT)
Age: 65
End: 2025-02-15

## 2025-02-17 ENCOUNTER — DOCUMENTATION ONLY (OUTPATIENT)
Dept: FAMILY MEDICINE | Facility: CLINIC | Age: 65
End: 2025-02-17
Payer: COMMERCIAL

## 2025-02-17 NOTE — PROGRESS NOTES
Type of Form Received: [facility] [id number]    Form Received (Date) 2/14/25   Form Filled out Yes, faxed 2/19   Placed in provider folder Yes

## 2025-02-18 ENCOUNTER — TELEPHONE (OUTPATIENT)
Dept: FAMILY MEDICINE | Facility: CLINIC | Age: 65
End: 2025-02-18
Payer: COMMERCIAL

## 2025-02-18 ENCOUNTER — DOCUMENTATION ONLY (OUTPATIENT)
Dept: FAMILY MEDICINE | Facility: CLINIC | Age: 65
End: 2025-02-18
Payer: COMMERCIAL

## 2025-02-18 NOTE — TELEPHONE ENCOUNTER
[facility] Call Center    Phone Message    May a detailed message be left on voicemail: yes     Reason for Call: Order(s): Home Care Orders: Occupational Therapy (OT): once a week for five weeks    Action Taken: Message routed to:  [facility] pcc    Travel Screening: Not Applicable     Date of Service:

## 2025-02-18 NOTE — PROGRESS NOTES
Type of Form Received: [facility] [id number]    Form Received (Date) 2/14/25   Form Filled out No   Placed in provider folder Yes

## 2025-02-18 NOTE — TELEPHONE ENCOUNTER
Spoke with Winston OT with [facility] and gave the following verbal order(s): Home Care Orders: Occupational Therapy (OT): once a week for five weeks.  Andie AGUILAR LPN  [facility]

## 2025-02-19 ENCOUNTER — DOCUMENTATION ONLY (OUTPATIENT)
Dept: FAMILY MEDICINE | Facility: CLINIC | Age: 65
End: 2025-02-19
Payer: COMMERCIAL

## 2025-02-19 NOTE — PROGRESS NOTES
Type of Form Received: [facility] [id number]    Form Received (Date) 2/18/25   Form Filled out No   Placed in provider folder Yes

## 2025-02-20 ENCOUNTER — MEDICAL CORRESPONDENCE (OUTPATIENT)
Dept: HEALTH INFORMATION MANAGEMENT | Facility: CLINIC | Age: 65
End: 2025-02-20
Payer: COMMERCIAL

## 2025-02-24 DIAGNOSIS — Z53.9 DIAGNOSIS NOT YET DEFINED: Primary | ICD-10-CM

## 2025-02-24 NOTE — CONSULTS
[facility]    Stroke Consult Note    Reason for Consult: Stroke Code     Chief Complaint: Hypoglycemia      HPI  Isabell Matthews is a 63 year old female PMH of prior strokes (left basal ganglia hemorrhagic infarct 2021, right centrum semiovale & right chuck 05/2023 2/2 ESUS on ASA 81 mg), history of epilepsy, T2DM, recurrent UTI, CKD, vascular vs Alzheimer's dementia, HLD, HTN, hx tobacco use, CAD, hypothyroidism, history of KDA, DM type 1 presented to the ED and stroke code called for unresponsive and altered mental status. Per daughter reports to finding patient slumped over in chair at home unresponsive at 12:30 AM and last known well 12 AM. Per EMG low glucose of < 30 and given D10 with repeat glucose of 240 in ED. Per daughter reports giving patient insulin earlier tonight but believes patient gave her self an additional dose of insulin because daughter found insulin by patient that is usually locked in purse. Daughter reports patient had urinary incontinence when found.     In the ED BP of 171/93 and glucose of 240. Not on anticoagulation but on aspirin 81 mg daily. Currently on keppra 500 mg BID. Prior NIH reported in 05/2023 of 2. CT head without hemorrhage. CTA head and neck no LVO but did show moderate stenosis of the right M2/M3 junction. NIH on exam of 9.     Imaging Findings    CT head 3/7/24   No intracranial hemorrhage, extraaxial collection, or mass effect. No definite CT evidence of acute large territory infarction. Linear encephalomalacia is identified involving the right external capsule and left external   capsule/subinsular regions. Mild to moderate presumed chronic small vessel ischemic changes. Mild to moderate generalized volume loss. No hydrocephalus.     CTA head and neck 3/7/24  HEAD CTA:   1.  No proximal large vessel occlusion.  2.  Multifocal regions of moderate to severe stenoses, predominantly involving the anterior circulation, as  2025      Ej Gustafson  [address] [place] [address]      Dear Colleague,    Thank you for referring your patient, Ej Gustafson, to the [facility]. Please see a copy of my visit note below.    Otolaryngology Consultation    Patient: Ej Gustafson  : 1988    Patient presents with:  Ear Problem: Lesion in right nares// Marcelina Carter NP Referring      HPI:  Ej Gustafson is a 36 year old male seen today for Evaluation of nasal sores.    He has noted a bump inside the right nares, present x 5 years.  He points to the medial alar cartilage.  More bothersome x 3 years.  It is irritating.  Bled once after manipulation.    He has over 3 episodes of maxillary and facial pressure, worsening congestion, post nasal drainage per year x 5 years.  Treated supportively with OTCs.  No chronic congestion.    CT [facility] in distant past, not in PACS    SNOT 63    He notes chronic sinusitis.     This has been present for years he has been seen at local [facility] dermatology and Dr. Phillip on 10/2/23    He was started on doxycycline and was to follow-up with ENT.  No change on doxycycline    Has tried mupirocin without improvement    No chronic intranasal medications, rhinorrhea or nasal picking.    10-year history of chewing tobacco use 2 cans/week      Sino-Nasal Outcome Test (SNOT - 22)    1. Need to Blow Nose: (Patient-Rptd) (P) Moderate;Severe;Bad as it can be  2. Nasal Blockage: (Patient-Rptd) (P) Severe  3. Sneezing: (Patient-Rptd) (P) Moderate;Severe  4. Runny Nose: (Patient-Rptd) (P) Moderate;Severe;Bad as it can be  5. Cough: (Patient-Rptd) (P) Moderate  6. Post-nasal discharge: (Patient-Rptd) (P) Moderate;Severe  7. Thick nasal discharge: (Patient-Rptd) (P) Moderate;Severe  8. Ear fullness: (Patient-Rptd) (P) Mild or slight;Moderate  9. Dizziness: (Patient-Rptd) (P) Very mild;Mild or slight  10. Ear Pain: (Patient-Rptd) (P) Very mild;Mild or slight  11. Facial pain/pressure: (Patient-Rptd) (P)  Moderate;Severe;Bad as it can be  12. Decreased Sense of Smell/Taste: (Patient-Rptd) (P) Severe  13. Difficulty falling asleep: (Patient-Rptd) (P) Severe  14. Wake up at night: (Patient-Rptd) (P) Severe  15. Lack of a good night's sleep: (Patient-Rptd) (P) Severe  16. Wake up tired: (Patient-Rptd) (P) Severe  17. Fatigue: (Patient-Rptd) (P) Moderate;Severe  18. Reduced Productivity: (Patient-Rptd) (P) Moderate;Severe  19. Reduced Concentration: (Patient-Rptd) (P) Moderate;Severe  20. Frustrated/restless/irritable: (Patient-Rptd) (P) Moderate;Severe  21. Sad: (Patient-Rptd) (P) Mild or slight;Moderate  22. Embarrassed: (Patient-Rptd) (P) None    Total Score: (Patient-Rptd) (P) 63    COPYRIGHT 1996. [facility] [place], [place]      Current Outpatient Rx   Medication Sig Dispense Refill     EPINEPHrine (ANY BX GENERIC EQUIV) 0.3 MG/0.3ML injection 2-pack Inject 0.3 mLs (0.3 mg) into the muscle once as needed for anaphylaxis May repeat one time in 5-15 minutes if response to initial dose is inadequate. 2 each 0       Allergies: Bee venom     Past Medical History:   Diagnosis Date     Tobacco abuse     chewing tobacco       Past Surgical History:   Procedure Laterality Date     NO HISTORY OF SURGERY         ENT family history reviewed    Social History     Tobacco Use     Smoking status: Every Day     Types: Dip, chew, snus or snuff     Smokeless tobacco: Current     Types: Chew     Tobacco comments:     2 cans/week- denies quit plan 9/9/21   Substance Use Topics     Alcohol use: Yes     Alcohol/week: 0.0 standard drinks of alcohol     Comment: 1 weekend/month     Drug use: No       Review of Systems  ROS: 10 point ROS neg other than the symptoms noted above in the HPI and occasional sneezing itchy eyes, ear plugging, headaches, GERD, diarrhea    Physical Exam  BP 98/64 (BP Location: Left arm, Patient Position: Sitting, Cuff Size: Adult Large)   Pulse 51   Temp 97.5  F (36.4  C) (Tympanic)   Resp 18    above. These findings may be related to atheromatous disease or vasculitis/vasculopathy.   3.  Severe short segment near occlusive stenosis involving a distal left branching M2/proximal M3 left middle cerebral artery.  4.  Mild to moderate stenosis of the left P2 posterior cerebral artery.     NECK CTA:  1.  Scattered atheromatous disease, as above.  2.  Moderate stenosis of the left carotid bifurcation and left proximal ICA measuring 50-60%.  3.  Moderate to severe stenosis involving the origin of the right vertebral artery.  4.  No dissection.    Intravenous Thrombolysis  Not given due to:   - minor/isolated/quickly resolving symptoms  - stroke mimic: hypoglycemia provokes seizure    Endovascular Treatment  Not initiated due to absence of proximal vessel occlusion    Impression   Isabell Matthews is a 63 year old female PMH of prior strokes (left basal ganglia hemorrhagic infarct 2021, right centrum semiovale & right chuck 05/2023 2/2 ESUS on ASA 81 mg), history of epilepsy, T2DM, recurrent UTI, CKD, vascular vs Alzheimer's dementia, HLD, HTN, hx tobacco use, CAD, hypothyroidism, history of KDA, DM type 1 presented to the ED and stroke code called for unresponsive and altered mental status. Per daughter reports to finding patient slumped over in chair at home unresponsive at 12:30 AM and last known well 12 AM. Per EMG low glucose of < 30 and given D10 with repeat glucose of 240 in ED. Reported patient had urinary incontinence when found.     In the ED BP of 171/93 and glucose of 240. Not on anitcoaguluation but on aspirin 81 mg daily. Currently on keppra 500 mg BID. Prior NIH reported in 05/2023 of 2. CT head without hemorrhage. CTA head and neck no LVO but did show moderate stenosis of the right M2/M3 junction. NIH on exam of 9. Upon repeat exam after imaging patient with NIH of 3 for level of consciousness, right lower nasolabial flattening that is baseline, and expressive aphasia. Exam notable for right lateral  "Ht 1.791 m (5' 10.5\")   Wt 83.9 kg (185 lb)   SpO2 98%   BMI 26.17 kg/m    General - The patient is well nourished and well developed, and appears to have good nutritional status.  Alert and oriented to person and place, answers questions and cooperates with examination appropriately.   Head and Face - Normocephalic and atraumatic, with no gross asymmetry noted.  The facial nerve is intact, with strong symmetric movements.  Voice and Breathing - The patient was breathing comfortably without the use of accessory muscles. There was no wheezing, stridor, or stertor.  The patients voice was clear and strong, and had appropriate pitch and quality.  No rakan peripheral digital clubbing or cyanosis   Ears -The external auditory canals are patent, the tympanic membranes are intact without effusion, retraction or mass.  Bony landmarks are intact.  Eyes - Extraocular movements intact, and the pupils were reactive to light.  Sclera were not icteric or injected, conjunctiva were pink and moist.  Mouth - Examination of the oral cavity showed pink, healthy oral mucosa. No lesions or ulcerations noted.  The tongue was mobile and midline, and the dentition were in good condition.  Cobblestoning with tobacco use changes to the midline lower lip, photos in epic no ulceration or mass  Throat - The walls of the oropharynx were smooth, pink, moist, symmetric, and had no lesions or ulcerations.  The tonsillar pillars and soft palate were symmetric.  The uvula was midline on elevation.    Neck - No palpable enlarged fixed cervical lymph nodes.  No neck cysts or unusual tenderness to palpation.   No palpable fixed thyroid nodules or concerning goiter.  The trachea is grossly midline.   Nose - External contour is slightly asymmetric at the nasal tip, no deflection or scars.  Nasal mucosa is pink and moist with no abnormal mucus.  The septum and turbinates were evaluated.  No polyps, masses, or purulence noted on examination.  I used a " tongue laceration. With the history of hypoglycemia, urinary incontinence, and tongue biting is most consistent with hypoglycemia induced seizure. Exam quickly improving almost to baseline is consistent with post ictal period. Recommend MRI brain without contrast to further evaluate for stroke given prior strokes.     Recommendations  -MRI brain without contrast, and if no acute stroke then no further stroke workup needed   -Seizure precautions  -Continue PTA keppra 500 mg     Patient Follow-up     - final recommendation pending work-up    Thank you for this consult. We will continue to follow.      The patient was discussed with Stroke Staff is Dr. Kang.    Tanika Cruz MD  Neurology Resident    ______________________________________________________  Past Medical History   No past medical history on file.  Past Surgical History   No past surgical history on file.  Medications   Home Meds  Prior to Admission medications    Not on File       Infusion Meds   dextrose 10% 100 mL/hr at 03/07/24 0151         Allergies   Not on File  Family History   No family history on file.  Social History        Review of Systems   Limited to HPI        PHYSICAL EXAMINATION  Pulse:  [78-86] 78  Resp:  [13-19] 13  BP: (163-171)/(93-94) 163/94  SpO2:  [96 %-98 %] 96 %     General Exam  General:  patient lying in bed without any acute distress    HEENT:  normocephalic/atraumatic, right lateral tongue laceration   Cardio:  RRR  Pulmonary:  no respiratory distress  Abdomen:  soft  Extremities:  no edema  Skin:  intact     Neuro Exam  Mental Status:  alert but requires repeating of questions, oriented to person and age but not year, follows simple commands, naming and repetition normal, delayed answers with answering questions with mild expressive aphasia   Cranial Nerves:  visual fields intact, PERRL, EOMI with normal smooth pursuit, facial sensation intact right lower nasolabial flattening, hearing not formally tested but intact to  conversation, palate elevation symmetric and uvula midline, no dysarthria, shoulder shrug strong bilaterally, tongue protrusion midline  Motor:  normal muscle tone and bulk, no abnormal movements, able to move all limbs spontaneously, antigravity without drift throughout upper and lower extremities, no pronator drift  Reflexes:  no clonus  Sensory:  light touch sensation intact and symmetric throughout upper and lower extremities, no extinction on double simultaneous stimulation   Coordination:  normal finger-to-nose and heel-to-shin bilaterally without dysmetria  Station/Gait:  deferred    Dysphagia Screen  Passed screening, no dysarthria - Regular Diet with thin liquids  03/07/2024     Stroke Scales    NIHSS  1a. Level of Consciousness 1-->Not alert, but arousable by minor stimulation to obey, answer, or respond   1b. LOC Questions 0-->Answers both questions correctly   1c. LOC Commands 0-->Performs both tasks correctly   2.   Best Gaze 0-->Normal   3.   Visual 0-->No visual loss   4.   Facial Palsy 1-->Minor paralysis (flattened nasolabial fold, asymmetry on smiling)   5a. Motor Arm, Left 0-->No drift, limb holds 90 (or 45) degrees for full 10 secs   5b. Motor Arm, Right 0-->No drift, limb holds 90 (or 45) degrees for full 10 secs   6a. Motor Leg, Left 0-->No drift, leg holds 30 degree position for full 5 secs   6b. Motor Leg, right 0-->No drift, leg holds 30 degree position for full 5 secs   7.   Limb Ataxia 0-->Absent   8.   Sensory 0-->Normal, no sensory loss   9.   Best Language 1-->Mild-to-moderate aphasia, some obvious loss of fluency or facility of comprehension, without significant limitation on ideas expressed or form of expression. Reduction of speech and/or comprehension, however, makes conversation. . . (see row details)   10. Dysarthria 0-->Normal   11. Extinction and Inattention  0-->No abnormality   Total 3 (03/07/24 0152)       Modified [place] Score (Pre-morbid)    -      Imaging  I personally  double ball and headlight and do not see any intranasal lesions or ulcerations.  There is internal nasal valve collapse on the right and the lateral alar cartilage is prevalent as well as the caudal superior septal deviation which corresponds to the area of his concern.  Normal overlying mucosa    To evaluate the nose and sinuses, I performed rigid nasal endoscopy.  I applied topical nasal lidocaine and neosynephrine.    I began with the LEFT side using a 0 degree rigid nasal endoscope, and then similarly examined the RIGHT side    Findings:  Inferior turbinates:  3+  Caudal DNS right, INV right, post right septal spur with contact  Middle turbinate and middle meatus:  No purulence, no polyposis,  Superior meatus was evaluated  Frontal recess clear  Mucosa is healthy throughout,  no polyps nor polypoid degeneration  Sphenoethmoidal recess without purulence   Nasopharynx clear  The patient tolerated the procedure well        Impression and Plan- Ej Gustafson is a 36 year old male with:    ICD-10-CM    1. DNS (deviated nasal septum)  J34.2       2. Nasal valve collapse  J34.829       3. Chewing tobacco nicotine dependence with nicotine-induced disorder  F17.229             Reassured.  There is no nasal mass or lesion.  He has slightly asymmetric nasal anatomy and this seems to correspond to what he is feeling in the right nares.  I told Ej if he develops crusting or any visible lesion to take a picture and submit it to us and I would be happy to see him back.    Tobacco cessation was strongly encouraged.  The associated risk of head and neck cancer was discussed.  Every year of cessation offers health benefits. This was discussed with the patient today and they voiced understanding.  Quit tools and a nicotine patch were offered.  He is not interested in quitting chewing at this juncture.      No nasal tumors, lesions or polyps  You have a deviated nasal septum right and nasal valve collapse  The cartilage in your  "reviewed all imaging; relevant findings per HPI.     Lab Results Data   CBC  Recent Labs   Lab 03/07/24  0114 03/07/24  0109   WBC  --  4.9   RBC  --  3.80   HGB 11.6* 10.8*   HCT 34* 34.0*   PLT  --  174     Basic Metabolic Panel    Recent Labs   Lab 03/07/24  0139 03/07/24  0114 03/07/24  0112 03/07/24  0109   NA  --  137  --  136   POTASSIUM  --  3.5  --  3.6   CHLORIDE  --   --   --  102   CO2  --   --   --  26   BUN  --   --   --  32.5*   CR  --   --  1.6* 1.50*   GLC 86 210*  --  224*   VERONICA  --   --   --  8.8     Liver Panel  No results for input(s): \"PROTTOTAL\", \"ALBUMIN\", \"BILITOTAL\", \"ALKPHOS\", \"AST\", \"ALT\", \"BILIDIRECT\" in the last 168 hours.  INR    Recent Labs   Lab Test 03/07/24  0111 03/07/24  0109   INR 1.1* 0.99      Lipid Profile  No lab results found.  A1C  No lab results found.  Troponin    Recent Labs   Lab 03/07/24  0109   CTROPT 26*          Stroke Code Data Data   Stroke Code Data  (for stroke code without tele)  Stroke code activated 03/07/24  0106   First stroke provider response 03/07/24  0110   Last known normal 03/07/24  0000   Time of discovery (or onset of symptoms)        Head CT read by Stroke Neuro Provider 03/07/24  0135   Was stroke code de-escalated? Yes  03/07/24  0149           " nose is slightly asymmetric and more prominent on the right.  This may be with causing your irritation but there is nothing to biopsy or remove.  Use Ayr gel at least twice daily for nasal dryness  Work on quitting chewing tobacco    Valentina You D.O.  Otolaryngology/Head and Neck Surgery  Allergy            Again, thank you for allowing me to participate in the care of your patient.        Sincerely,        Valentina You MD    Electronically signed

## 2025-03-03 ENCOUNTER — LAB (OUTPATIENT)
Dept: LAB | Facility: CLINIC | Age: 65
End: 2025-03-03
Payer: COMMERCIAL

## 2025-03-03 DIAGNOSIS — N18.9 CHRONIC KIDNEY DISEASE, UNSPECIFIED CKD STAGE: Primary | ICD-10-CM

## 2025-03-03 DIAGNOSIS — R30.0 DYSURIA: ICD-10-CM

## 2025-03-04 ENCOUNTER — DOCUMENTATION ONLY (OUTPATIENT)
Dept: FAMILY MEDICINE | Facility: CLINIC | Age: 65
End: 2025-03-04
Payer: COMMERCIAL

## 2025-03-04 NOTE — PROGRESS NOTES
Type of Form Received: [facility] [id number]    Form Received (Date) 3/3/25   Form Filled out No   Placed in provider folder Yes

## 2025-03-05 ENCOUNTER — MEDICAL CORRESPONDENCE (OUTPATIENT)
Dept: HEALTH INFORMATION MANAGEMENT | Facility: CLINIC | Age: 65
End: 2025-03-05
Payer: COMMERCIAL

## 2025-03-06 LAB
ALBUMIN UR-MCNC: NEGATIVE MG/DL
APPEARANCE UR: CLEAR
BILIRUB UR QL STRIP: NEGATIVE
COLOR UR AUTO: ABNORMAL
GLUCOSE UR STRIP-MCNC: NEGATIVE MG/DL
HGB UR QL STRIP: NEGATIVE
KETONES UR STRIP-MCNC: NEGATIVE MG/DL
LEUKOCYTE ESTERASE UR QL STRIP: NEGATIVE
NITRATE UR QL: NEGATIVE
PH UR STRIP: 6 [PH] (ref 5–7)
RBC URINE: 1 /HPF
SP GR UR STRIP: 1 (ref 1–1.03)
UROBILINOGEN UR STRIP-MCNC: NORMAL MG/DL
WBC URINE: <1 /HPF

## 2025-03-06 PROCEDURE — 81001 URINALYSIS AUTO W/SCOPE: CPT | Performed by: PATHOLOGY

## 2025-03-20 ENCOUNTER — VIRTUAL VISIT (OUTPATIENT)
Dept: PHARMACY | Facility: CLINIC | Age: 65
End: 2025-03-20
Attending: FAMILY MEDICINE
Payer: COMMERCIAL

## 2025-03-20 DIAGNOSIS — J30.1 SEASONAL ALLERGIC RHINITIS DUE TO POLLEN: ICD-10-CM

## 2025-03-20 DIAGNOSIS — E03.9 HYPOTHYROIDISM: ICD-10-CM

## 2025-03-20 DIAGNOSIS — M79.7 FIBROMYALGIA: ICD-10-CM

## 2025-03-20 DIAGNOSIS — Z78.9 TAKES DIETARY SUPPLEMENTS: ICD-10-CM

## 2025-03-20 DIAGNOSIS — R52 PAIN: ICD-10-CM

## 2025-03-20 DIAGNOSIS — Z86.73 HISTORY OF CVA (CEREBROVASCULAR ACCIDENT): ICD-10-CM

## 2025-03-20 DIAGNOSIS — E03.9 HYPOTHYROIDISM, UNSPECIFIED TYPE: ICD-10-CM

## 2025-03-20 DIAGNOSIS — E10.42 TYPE 1 DIABETES MELLITUS WITH DIABETIC POLYNEUROPATHY (H): Primary | ICD-10-CM

## 2025-03-20 DIAGNOSIS — N18.32 STAGE 3B CHRONIC KIDNEY DISEASE (H): ICD-10-CM

## 2025-03-20 DIAGNOSIS — R56.9 SEIZURES (H): ICD-10-CM

## 2025-03-20 DIAGNOSIS — E78.5 HYPERLIPIDEMIA LDL GOAL <100: ICD-10-CM

## 2025-03-20 DIAGNOSIS — I10 BENIGN ESSENTIAL HYPERTENSION: ICD-10-CM

## 2025-03-20 PROCEDURE — 99607 MTMS BY PHARM ADDL 15 MIN: CPT | Mod: 95

## 2025-03-20 PROCEDURE — 99605 MTMS BY PHARM NP 15 MIN: CPT | Mod: 95

## 2025-03-20 NOTE — LETTER
"Recommended To-Do List      Prepared on: Mar 20, 2025       You can get the best results from your medications by completing the items on this \"To-Do List.\"      Bring your To-Do List when you go to your doctor. And, share it with your family or caregivers.    My To-Do List:  What we talked about: What I should do:   Diabetes    I will confirm that Mendoza sensors are covered by insurance. If covered, I recommend trying again with over patches that help keep the sensor in place.    I sent an order for a new One Touch Verio Reflect meter.          What we talked about: What I should do:   Pain    Try using diclofenac (Voltaren) gel as needed for pain.          What we talked about: What I should do:   Seizures   I will let Dr. Kellogg know you are interested in trying a lower dose of levetiracetam.              "

## 2025-03-20 NOTE — LETTER
_  Medication List        Prepared on: Mar 20, 2025     Bring your Medication List when you go to the doctor, hospital, or   emergency room. And, share it with your family or caregivers.     Note any changes to how you take your medications.  Cross out medications when you no longer use them.    Medication How I take it Why I use it Prescriber   acetaminophen (TYLENOL) 325 MG tablet Take 3 tablets (975 mg) by mouth every 8 hours. Closed fracture of one rib of right side, initial encounter Afabhinavwi Yesenia Riggs, PEGGY CNP   aspirin (ASA) 81 MG chewable tablet Take 1 tablet (81 mg) by mouth daily Acute CVA (cerebrovascular accident) (H) Nighat Menard MD   atorvastatin (LIPITOR) 40 MG tablet Take 1 tablet (40 mg) by mouth daily. Hyperlipidemia LDL Goal <70 Bony Castaneda MD   carvedilol (COREG) 12.5 MG tablet Take 1 tablet (12.5 mg) by mouth 2 times daily (with meals). Essential Hypertension Bony Castaneda MD   chlorhexidine (PERIDEX) 0.12 % solution Take 30 mLs by mouth daily. Swish and spit 30 mL once daily for 14 days.Swish and spit 30 mL once daily for 14 days. Gingivitis Ronnie Kellogg MD   cyanocobalamin (VITAMIN B-12) tablet 1,000 mcg     Colton Mata MD   DULoxetine (CYMBALTA) 20 MG capsule TAKE 1 CAPSULE(20 MG) BY MOUTH DAILY Fibromyalgia Ronnie Kellogg MD   gabapentin (NEURONTIN) 100 MG capsule Take 1 capsule (100 mg) by mouth at bedtime. Type 1 diabetes mellitus with other circulatory complication (H) Bony Castaneda MD   insulin aspart (NOVOLOG FLEXPEN) 100 UNIT/ML pen Inject 2 units with meals plus correction scale additional three times daily before meals  as follows  Pre-Meal  - 200 give additional 1 unit.  For Pre-Meal  -250 give 2 additional units.  For Pre-Meal -300 give 3 additional units.  For Pre-Meal -350 give 4 additional units.  For Pre-Meal -400 give 5 additional units Max 25 units daily Type 1 diabetes mellitus with other circulatory  complication (H) Bony Castaneda MD   insulin aspart (NOVOLOG PENFILL) 100 UNIT/ML cartridge Inject 2-4 Units Subcutaneous 4 times daily (with meals and nightly) diabetes Type 2 diabetes mellitus with hypoglycemia without coma, with long-term current use of insulin (H) Jess Nair MD   insulin glargine (LANTUS PEN) 100 UNIT/ML pen Inject 20 Units subcutaneously at bedtime. Type 1 diabetes mellitus with other circulatory complication (H) Oziel Wyatt MD   levETIRAcetam (KEPPRA) tablet 500 mg Take 1 tablet (500 mg) by mouth twice daily.   Colton Mata MD   levothyroxine (SYNTHROID/LEVOTHROID) tablet 75 mcg Take 1 tablet (75 mcg) by mouth daily.   Colton Mata MD   loratadine (CLARITIN) 10 MG tablet Take 1 tablet (10 mg) by mouth daily. Dermatitis Bony Castaneda MD   methocarbamol (ROBAXIN) 500 MG tablet Take 1 tablet (500 mg) by mouth daily as needed for muscle spasms. Closed fracture of one rib of right side, initial encounter Ronnie Kellogg MD   zinc oxide (DESITIN) 20 % external ointment Apply topically as needed for dry skin or irritation Fecal smearing Nighat Menard MD         Add new medications, over-the-counter drugs, herbals, vitamins, or  minerals in the blank rows below.    Medication How I take it Why I use it Prescriber                                      Allergies:      - Seasonal Allergies        Side effects I have had:      Not on File        Other Information:              My notes and questions:

## 2025-03-20 NOTE — PROGRESS NOTES
" Medication Therapy Management (MTM) Encounter    ASSESSMENT:                            Medication Adherence/Access: No issues identified.    Diabetes with CKD (CrCl 35.1 mL/min, eGFR 37 mL/min/1.73m2)  Patient is not meeting A1c goal of <8%.  Self monitoring of blood glucose is not at goal of fasting  mg/dL.  Patient would benefit from restarting CGM with [employer], would recommend using over patches to keep sensor in place. Will confirm sensors are covered by insurance. Sent order for new One Touch Verio Reflect meter. Doses of medications are appropriate based on current kidney function.    Hypertension   Stable, blood pressure remains at goal of <140/90 mmHg without amlodipine.     Hyperlipidemia, Hx of CVA  Stable.     Hip and Shoulder Pain, Fibromyalgia  Patient may benefit from trying diclofenac gel as needed for pain.    Allergy   Stable.    Seizures  Per 10/15/24 neurology note \"Patient previously had seizures in the setting of strokes and DKA. These were described as unilateral shaking with loss of consciousness. She hasn't had any clear seizures in the last 4 years. I would recommend continuing Keppra.\" Discussed that levetiracetam commonly causes drowsiness and fatigue, but the risk of stopping/decreasing the dose of is having a seizure. Will defer to PCP if dose of levetiracetam can be decreased.    Hypothyroidism   Due for TSH recheck.    Supplements, Topicals  Stable.    PLAN:                            I will confirm that [employer] sensors are covered by insurance. If covered, I recommend trying again with over patches that help keep the sensor in place.  I sent an order for a new One Touch Verio Reflect meter.  Try using diclofenac (Voltaren) gel as needed for pain.  I will let Dr. Kellogg know you are interested in trying a lower dose of levetiracetam.    Follow-up: with MTM as needed    SUBJECTIVE/OBJECTIVE:                          Isabell Matthews is a 64 year old female seen for an initial visit. She " was referred to me from Dr. Kellogg. Patient's daughter, Nehal, is present for today's video visit. She is Isabell's primary care taker and manages all of her medications. Patient does also have a home RN.    Reason for visit: CGM    Allergies/ADRs: Reviewed in chart  Past Medical History: Reviewed in chart  Tobacco: She reports that she quit smoking about 6 years ago. Her smoking use included cigarettes. She started smoking about 41 years ago. She has a 17.5 pack-year smoking history. She has never used smokeless tobacco.  Alcohol: none    Medication Adherence/Access: no issues reported.    Diabetes with CKD  Lantus 20 units daily at bedtime  Novolog 3-5 units 3 times daily with meals plus correction scale  Glucagon injection as needed for hypoglycemia  Fasting glucose is ranges from 150-200 mg/dL. Reports 2 episodes of hypoglycemia in the past 2 weeks, readings were 48 and 65 mg/dL. Patient gets very slow when blood sugar is low. Was previously using [employer] for CGM, but sensors kept falling off. Also had issues getting insurance to pay for it. It was convenient, but not worth the hassle. Would be willing to retry with over patches if covered by insurance. Also needs a new One Touch Verio Reflect meter because current one is not working as well.  Patient is not experiencing side effects.  Current diabetes symptoms: None  Diet/Exercise: Not discussed today     Eye exam in the last 12 months? Yes  Foot exam: due    Hypertension   Amlodipine 5 mg daily - not taking  Carvedilol 12.5 mg twice daily  Reports stopping amlodipine 5 months ago after running out of refills. Did not restart because blood pressure was controlled. Patient does experience dizziness, but not because of low blood pressure. Suspected to be an inner ear problem/vertigo. Is doing PT and OT to help with this. Patient reports no current medication side effects. Patient self monitors blood pressure.  Home BP monitoring 120-130/70s mmHg. Heart rate is 70-80  "bmp .     Hyperlipidemia , Hx of CVA  Atorvastatin 40 mg daily  Aspirin 81 mg daily  Patient reports no significant myalgias or other side effects.  Most recent CVA was 6/2024.      Hip and Shoulder Pain, Fibromyalgia  Acetaminophen 975 mg every 8 hours  Duloxetine 20 mg daily  Gabapentin 100 mg at bedtime  Methocarbamol 500 mg daily as needed  Patient is experiencing some drowsiness and fatigue.  Pain is somewhat controlled at this time. Notes avoiding opioids.  Lidocaine patches were not effective in the past.  Has diclofenac gel at home, but hasn't tried using.    Allergy   Loratadine 10 mg daily  Patient reports no current medication side effects.     Patient feels that current therapy is effective.     Seizures  Levetiracetam 500 mg twice daily  Last seizure was >4 years ago. Patient's daughter wonders if levetiracetam is needed and is interested in stopping or at the very least trying a lower dose due to drowsiness and fatigue.    Hypothyroidism   Synthroid 75 mcg daily  Patient is having the following symptoms: none.   Takes consistently with regard to other medications and food.  Reports legs \"jump and twitch\" with generic levothyroxine.     Supplements , Topicals  Vitamin B12 1000 mcg daily  Zinc oxide 20% ointment as needed for dry skin or irritation  Chlorhexidine 0.12% swish and spit 30 mL once daily  No reported issues at this time.      Today's Vitals: There were no vitals taken for this visit.  ----------------    I spent 35 minutes with this patient today. All changes were made via collaborative practice agreement with Ronnei Kellogg MD.     A summary of these recommendations was sent via [address].    Rohini Villa (Hailie), LucyD, MPH  Medication Therapy Management Pharmacist     Telemedicine Visit Details  The patient's medications can be safely assessed via a telemedicine encounter.  Type of service:  Video Conference via [employer]  Originating Location (pt. Location): Home    Distant Location " (provider location):  On-site  Start Time:  2:30 PM  End Time:  3:05 PM     Medication Therapy Recommendations  Pain   1 Rationale: Untreated condition - Needs additional medication therapy - Indication   Recommendation: Start Medication - Voltaren 1 % Gel   Status: Accepted per CPA   Identified Date: 3/20/2025 Completed Date: 3/20/2025         Type 1 diabetes mellitus with diabetic polyneuropathy (H)   1 Current Medication: Continuous Glucose Sensor (FREESTYLE MARIA FERNANDA 2 SENSOR) MISC   Current Medication Sig: Change every 14 days.   Rationale: Medication product not available - Adherence - Adherence   Recommendation: Provide Adherence Intervention   Status: Accepted - no CPA Needed   Identified Date: 3/20/2025 Completed Date: 3/20/2025

## 2025-03-20 NOTE — LETTER
March 26, 2025  Isabell LUU Cassie  [address]  [place] [address]    Dear Ms. Matthews, [facility]     Thank you for talking with me on Mar 20, 2025 about your health and medications. As a follow-up to our conversation, I have included two documents:      Your Recommended To-Do List has steps you should take to get the best results from your medications.  Your Medication List will help you keep track of your medications and how to take them.    If you want to talk about these documents, please call Mackenzie Villa RPH at phone: [phone], Monday-Friday 8-4:30pm.    I look forward to working with you and your doctors to make sure your medications work well for you.    Sincerely,  Mackenzie Villa RPH  [facility] Pharmacist, [facility]

## 2025-03-23 ENCOUNTER — MYC MEDICAL ADVICE (OUTPATIENT)
Dept: FAMILY MEDICINE | Facility: CLINIC | Age: 65
End: 2025-03-23

## 2025-03-23 ENCOUNTER — APPOINTMENT (OUTPATIENT)
Dept: GENERAL RADIOLOGY | Facility: CLINIC | Age: 65
DRG: 563 | End: 2025-03-23
Attending: EMERGENCY MEDICINE
Payer: COMMERCIAL

## 2025-03-23 ENCOUNTER — APPOINTMENT (OUTPATIENT)
Dept: GENERAL RADIOLOGY | Facility: CLINIC | Age: 65
DRG: 563 | End: 2025-03-23
Payer: COMMERCIAL

## 2025-03-23 ENCOUNTER — APPOINTMENT (OUTPATIENT)
Dept: CT IMAGING | Facility: CLINIC | Age: 65
DRG: 563 | End: 2025-03-23
Attending: EMERGENCY MEDICINE
Payer: COMMERCIAL

## 2025-03-23 ENCOUNTER — HOSPITAL ENCOUNTER (INPATIENT)
Facility: CLINIC | Age: 65
DRG: 563 | End: 2025-03-23
Attending: EMERGENCY MEDICINE | Admitting: INTERNAL MEDICINE
Payer: COMMERCIAL

## 2025-03-23 DIAGNOSIS — S52.572A OTH INTARTIC FRACTURE OF LOWER END OF LEFT RADIUS, INIT: ICD-10-CM
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
## 2025-03-23 DIAGNOSIS — E10.59 TYPE 1 DIABETES MELLITUS WITH OTHER CIRCULATORY COMPLICATION (H): ICD-10-CM

## 2025-03-23 DIAGNOSIS — S52.612A CLOSED DISPLACED FRACTURE OF STYLOID PROCESS OF LEFT ULNA, INITIAL ENCOUNTER: ICD-10-CM

## 2025-03-23 DIAGNOSIS — Z86.73 HISTORY OF CVA (CEREBROVASCULAR ACCIDENT): ICD-10-CM

## 2025-03-23 DIAGNOSIS — E86.0 DEHYDRATION: Primary | ICD-10-CM

## 2025-03-23 DIAGNOSIS — S22.41XA CLOSED FRACTURE OF MULTIPLE RIBS OF RIGHT SIDE, INITIAL ENCOUNTER: ICD-10-CM

## 2025-03-23 DIAGNOSIS — I10 ESSENTIAL HYPERTENSION: ICD-10-CM

## 2025-03-23 DIAGNOSIS — M54.6 PAIN IN THORACIC SPINE: ICD-10-CM

## 2025-03-23 DIAGNOSIS — S62.102A WRIST FRACTURE, LEFT, CLOSED, INITIAL ENCOUNTER: ICD-10-CM

## 2025-03-23 DIAGNOSIS — M54.50 LOW BACK PAIN, UNSPECIFIED BACK PAIN LATERALITY, UNSPECIFIED CHRONICITY, UNSPECIFIED WHETHER SCIATICA PRESENT: ICD-10-CM

## 2025-03-23 DIAGNOSIS — R51.9 NONINTRACTABLE HEADACHE, UNSPECIFIED CHRONICITY PATTERN, UNSPECIFIED HEADACHE TYPE: ICD-10-CM

## 2025-03-23 DIAGNOSIS — G40.911 INTRACTABLE EPILEPSY WITH STATUS EPILEPTICUS, UNSPECIFIED EPILEPSY TYPE (H): ICD-10-CM

## 2025-03-23 DIAGNOSIS — M25.522 LEFT ELBOW PAIN: ICD-10-CM

## 2025-03-23 DIAGNOSIS — R42 VERTIGO: Primary | ICD-10-CM

## 2025-03-23 DIAGNOSIS — R53.83 LETHARGY: ICD-10-CM

## 2025-03-23 DIAGNOSIS — M54.2 CERVICALGIA: ICD-10-CM

## 2025-03-23 DIAGNOSIS — F01.53 VASCULAR DEMENTIA WITH DEPRESSED MOOD (H): ICD-10-CM

## 2025-03-23 DIAGNOSIS — R79.89 ELEVATED TROPONIN: ICD-10-CM

## 2025-03-23 DIAGNOSIS — N30.00 ACUTE CYSTITIS WITHOUT HEMATURIA: ICD-10-CM

## 2025-03-23 DIAGNOSIS — S22.32XA CLOSED FRACTURE OF ONE RIB OF LEFT SIDE, INITIAL ENCOUNTER: ICD-10-CM

## 2025-03-23 LAB
ALBUMIN SERPL BCG-MCNC: 3.7 G/DL (ref 3.5–5.2)
ALBUMIN UR-MCNC: 20 MG/DL
ALP SERPL-CCNC: 144 U/L (ref 40–150)
ALT SERPL W P-5'-P-CCNC: 28 U/L (ref 0–50)
ANION GAP SERPL CALCULATED.3IONS-SCNC: 14 MMOL/L (ref 7–15)
APPEARANCE UR: ABNORMAL
AST SERPL W P-5'-P-CCNC: 40 U/L (ref 0–45)
ATRIAL RATE - MUSE: 79 BPM
BACTERIA #/AREA URNS HPF: ABNORMAL /HPF
BASOPHILS # BLD AUTO: 0 10E3/UL (ref 0–0.2)
BASOPHILS NFR BLD AUTO: 0 %
BILIRUB SERPL-MCNC: 0.2 MG/DL
BILIRUB UR QL STRIP: NEGATIVE
BUN SERPL-MCNC: 45 MG/DL (ref 8–23)
C PNEUM DNA SPEC QL NAA+PROBE: NOT DETECTED
CALCIUM SERPL-MCNC: 9.3 MG/DL (ref 8.8–10.4)
CHLORIDE SERPL-SCNC: 106 MMOL/L (ref 98–107)
COLOR UR AUTO: ABNORMAL
CREAT SERPL-MCNC: 1.54 MG/DL (ref 0.51–0.95)
DIASTOLIC BLOOD PRESSURE - MUSE: NORMAL MMHG
EGFRCR SERPLBLD CKD-EPI 2021: 37 ML/MIN/1.73M2
EOSINOPHIL # BLD AUTO: 0.1 10E3/UL (ref 0–0.7)
EOSINOPHIL NFR BLD AUTO: 2 %
ERYTHROCYTE [DISTWIDTH] IN BLOOD BY AUTOMATED COUNT: 14.6 % (ref 10–15)
EST. AVERAGE GLUCOSE BLD GHB EST-MCNC: 206 MG/DL
FERRITIN SERPL-MCNC: 17 NG/ML (ref 11–328)
FLUAV H1 2009 PAND RNA SPEC QL NAA+PROBE: NOT DETECTED
FLUAV H1 RNA SPEC QL NAA+PROBE: NOT DETECTED
FLUAV H3 RNA SPEC QL NAA+PROBE: NOT DETECTED
FLUAV RNA SPEC QL NAA+PROBE: NOT DETECTED
FLUBV RNA SPEC QL NAA+PROBE: NOT DETECTED
GLUCOSE BLDC GLUCOMTR-MCNC: 187 MG/DL (ref 70–99)
GLUCOSE BLDC GLUCOMTR-MCNC: 262 MG/DL (ref 70–99)
GLUCOSE BLDC GLUCOMTR-MCNC: 314 MG/DL (ref 70–99)
GLUCOSE SERPL-MCNC: 345 MG/DL (ref 70–99)
GLUCOSE UR STRIP-MCNC: >=1000 MG/DL
HADV DNA SPEC QL NAA+PROBE: NOT DETECTED
HBA1C MFR BLD: 8.8 %
HCO3 SERPL-SCNC: 20 MMOL/L (ref 22–29)
HCOV PNL SPEC NAA+PROBE: NOT DETECTED
HCT VFR BLD AUTO: 33.6 % (ref 35–47)
HGB BLD-MCNC: 10.5 G/DL (ref 11.7–15.7)
HGB UR QL STRIP: NEGATIVE
HMPV RNA SPEC QL NAA+PROBE: NOT DETECTED
HPIV1 RNA SPEC QL NAA+PROBE: NOT DETECTED
HPIV2 RNA SPEC QL NAA+PROBE: NOT DETECTED
HPIV3 RNA SPEC QL NAA+PROBE: NOT DETECTED
HPIV4 RNA SPEC QL NAA+PROBE: NOT DETECTED
IMM GRANULOCYTES # BLD: 0 10E3/UL
IMM GRANULOCYTES NFR BLD: 1 %
INTERPRETATION ECG - MUSE: NORMAL
IRON BINDING CAPACITY (ROCHE): 314 UG/DL (ref 240–430)
IRON SATN MFR SERPL: 15 % (ref 15–46)
IRON SERPL-MCNC: 47 UG/DL (ref 37–145)
KETONES UR STRIP-MCNC: NEGATIVE MG/DL
LEUKOCYTE ESTERASE UR QL STRIP: NEGATIVE
LYMPHOCYTES # BLD AUTO: 1.3 10E3/UL (ref 0.8–5.3)
LYMPHOCYTES NFR BLD AUTO: 24 %
M PNEUMO DNA SPEC QL NAA+PROBE: NOT DETECTED
MCH RBC QN AUTO: 27.8 PG (ref 26.5–33)
MCHC RBC AUTO-ENTMCNC: 31.3 G/DL (ref 31.5–36.5)
MCV RBC AUTO: 89 FL (ref 78–100)
MONOCYTES # BLD AUTO: 0.3 10E3/UL (ref 0–1.3)
MONOCYTES NFR BLD AUTO: 5 %
NEUTROPHILS # BLD AUTO: 3.8 10E3/UL (ref 1.6–8.3)
NEUTROPHILS NFR BLD AUTO: 68 %
NITRATE UR QL: POSITIVE
NRBC # BLD AUTO: 0 10E3/UL
NRBC BLD AUTO-RTO: 0 /100
P AXIS - MUSE: 32 DEGREES
PH UR STRIP: 5.5 [PH] (ref 5–7)
PLATELET # BLD AUTO: 185 10E3/UL (ref 150–450)
POTASSIUM SERPL-SCNC: 4.9 MMOL/L (ref 3.4–5.3)
PR INTERVAL - MUSE: 144 MS
PROT SERPL-MCNC: 6.4 G/DL (ref 6.4–8.3)
QRS DURATION - MUSE: 88 MS
QT - MUSE: 390 MS
QTC - MUSE: 447 MS
R AXIS - MUSE: -6 DEGREES
RBC # BLD AUTO: 3.78 10E6/UL (ref 3.8–5.2)
RBC URINE: <1 /HPF
RSV RNA SPEC QL NAA+PROBE: NOT DETECTED
RSV RNA SPEC QL NAA+PROBE: NOT DETECTED
RV+EV RNA SPEC QL NAA+PROBE: NOT DETECTED
SODIUM SERPL-SCNC: 140 MMOL/L (ref 135–145)
SP GR UR STRIP: 1.02 (ref 1–1.03)
SQUAMOUS EPITHELIAL: 6 /HPF
SYSTOLIC BLOOD PRESSURE - MUSE: NORMAL MMHG
T AXIS - MUSE: 25 DEGREES
T4 FREE SERPL-MCNC: 0.67 NG/DL (ref 0.9–1.7)
TROPONIN T SERPL HS-MCNC: 46 NG/L
TROPONIN T SERPL HS-MCNC: 49 NG/L
TSH SERPL DL<=0.005 MIU/L-ACNC: 37.3 UIU/ML (ref 0.3–4.2)
UROBILINOGEN UR STRIP-MCNC: NORMAL MG/DL
VENTRICULAR RATE- MUSE: 79 BPM
VIT B12 SERPL-MCNC: 880 PG/ML (ref 232–1245)
WBC # BLD AUTO: 5.5 10E3/UL (ref 4–11)
WBC URINE: 3 /HPF

## 2025-03-23 PROCEDURE — 83550 IRON BINDING TEST: CPT

## 2025-03-23 PROCEDURE — 82607 VITAMIN B-12: CPT

## 2025-03-23 PROCEDURE — 84443 ASSAY THYROID STIM HORMONE: CPT

## 2025-03-23 PROCEDURE — 71046 X-RAY EXAM CHEST 2 VIEWS: CPT | Mod: 26 | Performed by: RADIOLOGY

## 2025-03-23 PROCEDURE — 72100 X-RAY EXAM L-S SPINE 2/3 VWS: CPT | Mod: 26 | Performed by: STUDENT IN AN ORGANIZED HEALTH CARE EDUCATION/TRAINING PROGRAM

## 2025-03-23 PROCEDURE — 36415 COLL VENOUS BLD VENIPUNCTURE: CPT | Performed by: EMERGENCY MEDICINE

## 2025-03-23 PROCEDURE — 73130 X-RAY EXAM OF HAND: CPT | Mod: LT

## 2025-03-23 PROCEDURE — 85025 COMPLETE CBC W/AUTO DIFF WBC: CPT | Performed by: EMERGENCY MEDICINE

## 2025-03-23 PROCEDURE — 36415 COLL VENOUS BLD VENIPUNCTURE: CPT

## 2025-03-23 PROCEDURE — 81001 URINALYSIS AUTO W/SCOPE: CPT | Performed by: EMERGENCY MEDICINE

## 2025-03-23 PROCEDURE — 250N000012 HC RX MED GY IP 250 OP 636 PS 637

## 2025-03-23 PROCEDURE — 72125 CT NECK SPINE W/O DYE: CPT | Mod: 26 | Performed by: RADIOLOGY

## 2025-03-23 PROCEDURE — 72072 X-RAY EXAM THORAC SPINE 3VWS: CPT

## 2025-03-23 PROCEDURE — 83540 ASSAY OF IRON: CPT

## 2025-03-23 PROCEDURE — 25600 CLTX DST RDL FX/EPHYS SEP WO: CPT | Mod: LT | Performed by: EMERGENCY MEDICINE

## 2025-03-23 PROCEDURE — 84439 ASSAY OF FREE THYROXINE: CPT

## 2025-03-23 PROCEDURE — 73110 X-RAY EXAM OF WRIST: CPT | Mod: LT

## 2025-03-23 PROCEDURE — 93010 ELECTROCARDIOGRAM REPORT: CPT | Performed by: EMERGENCY MEDICINE

## 2025-03-23 PROCEDURE — 120N000002 HC R&B MED SURG/OB UMMC

## 2025-03-23 PROCEDURE — 250N000013 HC RX MED GY IP 250 OP 250 PS 637: Performed by: EMERGENCY MEDICINE

## 2025-03-23 PROCEDURE — 87086 URINE CULTURE/COLONY COUNT: CPT | Performed by: EMERGENCY MEDICINE

## 2025-03-23 PROCEDURE — 73130 X-RAY EXAM OF HAND: CPT | Mod: 26 | Performed by: RADIOLOGY

## 2025-03-23 PROCEDURE — 84484 ASSAY OF TROPONIN QUANT: CPT | Performed by: EMERGENCY MEDICINE

## 2025-03-23 PROCEDURE — 73070 X-RAY EXAM OF ELBOW: CPT | Mod: LT

## 2025-03-23 PROCEDURE — 250N000013 HC RX MED GY IP 250 OP 250 PS 637

## 2025-03-23 PROCEDURE — 25600 CLTX DST RDL FX/EPHYS SEP WO: CPT | Mod: 54 | Performed by: EMERGENCY MEDICINE

## 2025-03-23 PROCEDURE — 99285 EMERGENCY DEPT VISIT HI MDM: CPT | Mod: 57 | Performed by: EMERGENCY MEDICINE

## 2025-03-23 PROCEDURE — 73070 X-RAY EXAM OF ELBOW: CPT | Mod: 26 | Performed by: RADIOLOGY

## 2025-03-23 PROCEDURE — 82962 GLUCOSE BLOOD TEST: CPT

## 2025-03-23 PROCEDURE — 70486 CT MAXILLOFACIAL W/O DYE: CPT

## 2025-03-23 PROCEDURE — 70450 CT HEAD/BRAIN W/O DYE: CPT

## 2025-03-23 PROCEDURE — 250N000011 HC RX IP 250 OP 636: Performed by: EMERGENCY MEDICINE

## 2025-03-23 PROCEDURE — 70486 CT MAXILLOFACIAL W/O DYE: CPT | Mod: 26 | Performed by: RADIOLOGY

## 2025-03-23 PROCEDURE — 72072 X-RAY EXAM THORAC SPINE 3VWS: CPT | Mod: 26 | Performed by: STUDENT IN AN ORGANIZED HEALTH CARE EDUCATION/TRAINING PROGRAM

## 2025-03-23 PROCEDURE — 73521 X-RAY EXAM HIPS BI 2 VIEWS: CPT | Mod: 26 | Performed by: RADIOLOGY

## 2025-03-23 PROCEDURE — 82728 ASSAY OF FERRITIN: CPT

## 2025-03-23 PROCEDURE — 72125 CT NECK SPINE W/O DYE: CPT

## 2025-03-23 PROCEDURE — 87486 CHLMYD PNEUM DNA AMP PROBE: CPT

## 2025-03-23 PROCEDURE — 73090 X-RAY EXAM OF FOREARM: CPT | Mod: LT

## 2025-03-23 PROCEDURE — 99222 1ST HOSP IP/OBS MODERATE 55: CPT | Mod: GC | Performed by: PEDIATRICS

## 2025-03-23 PROCEDURE — 73090 X-RAY EXAM OF FOREARM: CPT | Mod: 26 | Performed by: RADIOLOGY

## 2025-03-23 PROCEDURE — 87581 M.PNEUMON DNA AMP PROBE: CPT

## 2025-03-23 PROCEDURE — 73522 X-RAY EXAM HIPS BI 3-4 VIEWS: CPT

## 2025-03-23 PROCEDURE — 83036 HEMOGLOBIN GLYCOSYLATED A1C: CPT

## 2025-03-23 PROCEDURE — 72100 X-RAY EXAM L-S SPINE 2/3 VWS: CPT

## 2025-03-23 PROCEDURE — 87040 BLOOD CULTURE FOR BACTERIA: CPT

## 2025-03-23 PROCEDURE — 99285 EMERGENCY DEPT VISIT HI MDM: CPT | Mod: 25 | Performed by: EMERGENCY MEDICINE

## 2025-03-23 PROCEDURE — 71046 X-RAY EXAM CHEST 2 VIEWS: CPT

## 2025-03-23 PROCEDURE — 80053 COMPREHEN METABOLIC PANEL: CPT | Performed by: EMERGENCY MEDICINE

## 2025-03-23 PROCEDURE — 73110 X-RAY EXAM OF WRIST: CPT | Mod: 26 | Performed by: RADIOLOGY

## 2025-03-23 PROCEDURE — 70450 CT HEAD/BRAIN W/O DYE: CPT | Mod: 26 | Performed by: RADIOLOGY

## 2025-03-23 PROCEDURE — 93005 ELECTROCARDIOGRAM TRACING: CPT | Performed by: EMERGENCY MEDICINE

## 2025-03-23 RX ORDER — ATORVASTATIN CALCIUM 20 MG/1
40 TABLET, FILM COATED ORAL DAILY
Status: DISCONTINUED | OUTPATIENT
Start: 2025-03-23 | End: 2025-03-30 | Stop reason: HOSPADM

## 2025-03-23 RX ORDER — ASPIRIN 81 MG/1
81 TABLET, CHEWABLE ORAL DAILY
Status: DISCONTINUED | OUTPATIENT
Start: 2025-03-23 | End: 2025-03-30 | Stop reason: HOSPADM

## 2025-03-23 RX ORDER — DEXTROSE MONOHYDRATE 25 G/50ML
25-50 INJECTION, SOLUTION INTRAVENOUS
Status: DISCONTINUED | OUTPATIENT
Start: 2025-03-23 | End: 2025-03-25

## 2025-03-23 RX ORDER — DULOXETIN HYDROCHLORIDE 20 MG/1
20 CAPSULE, DELAYED RELEASE ORAL DAILY
Status: DISCONTINUED | OUTPATIENT
Start: 2025-03-23 | End: 2025-03-30 | Stop reason: HOSPADM

## 2025-03-23 RX ORDER — CARVEDILOL 6.25 MG/1
12.5 TABLET ORAL 2 TIMES DAILY WITH MEALS
Status: DISCONTINUED | OUTPATIENT
Start: 2025-03-23 | End: 2025-03-30 | Stop reason: HOSPADM

## 2025-03-23 RX ORDER — LANOLIN ALCOHOL/MO/W.PET/CERES
1000 CREAM (GRAM) TOPICAL DAILY
Status: DISCONTINUED | OUTPATIENT
Start: 2025-03-23 | End: 2025-03-30 | Stop reason: HOSPADM

## 2025-03-23 RX ORDER — CARVEDILOL 3.12 MG/1
12.5 TABLET ORAL ONCE
Status: COMPLETED | OUTPATIENT
Start: 2025-03-23 | End: 2025-03-23

## 2025-03-23 RX ORDER — GABAPENTIN 100 MG/1
100 CAPSULE ORAL AT BEDTIME
Status: DISCONTINUED | OUTPATIENT
Start: 2025-03-23 | End: 2025-03-30 | Stop reason: HOSPADM

## 2025-03-23 RX ORDER — CEFTRIAXONE 1 G/1
1 INJECTION, POWDER, FOR SOLUTION INTRAMUSCULAR; INTRAVENOUS EVERY 24 HOURS
Status: DISCONTINUED | OUTPATIENT
Start: 2025-03-24 | End: 2025-03-25

## 2025-03-23 RX ORDER — AMLODIPINE BESYLATE 5 MG/1
5 TABLET ORAL DAILY
Status: DISCONTINUED | OUTPATIENT
Start: 2025-03-23 | End: 2025-03-27

## 2025-03-23 RX ORDER — ONDANSETRON 4 MG/1
4 TABLET, ORALLY DISINTEGRATING ORAL EVERY 6 HOURS PRN
Status: DISCONTINUED | OUTPATIENT
Start: 2025-03-23 | End: 2025-03-30 | Stop reason: HOSPADM

## 2025-03-23 RX ORDER — LORATADINE 10 MG/1
10 TABLET ORAL DAILY
Status: DISCONTINUED | OUTPATIENT
Start: 2025-03-23 | End: 2025-03-30 | Stop reason: HOSPADM

## 2025-03-23 RX ORDER — PROCHLORPERAZINE MALEATE 5 MG/1
5 TABLET ORAL EVERY 6 HOURS PRN
Status: DISCONTINUED | OUTPATIENT
Start: 2025-03-23 | End: 2025-03-30 | Stop reason: HOSPADM

## 2025-03-23 RX ORDER — NICOTINE POLACRILEX 4 MG
15-30 LOZENGE BUCCAL
Status: DISCONTINUED | OUTPATIENT
Start: 2025-03-23 | End: 2025-03-25

## 2025-03-23 RX ORDER — ONDANSETRON 2 MG/ML
4 INJECTION INTRAMUSCULAR; INTRAVENOUS EVERY 6 HOURS PRN
Status: DISCONTINUED | OUTPATIENT
Start: 2025-03-23 | End: 2025-03-30 | Stop reason: HOSPADM

## 2025-03-23 RX ORDER — GUAIFENESIN/DEXTROMETHORPHAN 100-10MG/5
10 SYRUP ORAL EVERY 4 HOURS PRN
Status: DISCONTINUED | OUTPATIENT
Start: 2025-03-23 | End: 2025-03-30 | Stop reason: HOSPADM

## 2025-03-23 RX ORDER — LEVETIRACETAM 500 MG/1
500 TABLET ORAL ONCE
Status: COMPLETED | OUTPATIENT
Start: 2025-03-23 | End: 2025-03-23

## 2025-03-23 RX ORDER — CEFTRIAXONE 1 G/1
1 INJECTION, POWDER, FOR SOLUTION INTRAMUSCULAR; INTRAVENOUS ONCE
Status: COMPLETED | OUTPATIENT
Start: 2025-03-23 | End: 2025-03-23

## 2025-03-23 RX ORDER — ACETAMINOPHEN 325 MG/1
975 TABLET ORAL ONCE
Status: COMPLETED | OUTPATIENT
Start: 2025-03-23 | End: 2025-03-23

## 2025-03-23 RX ORDER — CALCIUM CARBONATE 500 MG/1
1000 TABLET, CHEWABLE ORAL 4 TIMES DAILY PRN
Status: DISCONTINUED | OUTPATIENT
Start: 2025-03-23 | End: 2025-03-30 | Stop reason: HOSPADM

## 2025-03-23 RX ORDER — POLYETHYLENE GLYCOL 3350 17 G/17G
17 POWDER, FOR SOLUTION ORAL 2 TIMES DAILY PRN
Status: DISCONTINUED | OUTPATIENT
Start: 2025-03-23 | End: 2025-03-30 | Stop reason: HOSPADM

## 2025-03-23 RX ORDER — AMOXICILLIN 250 MG
2 CAPSULE ORAL 2 TIMES DAILY PRN
Status: DISCONTINUED | OUTPATIENT
Start: 2025-03-23 | End: 2025-03-30 | Stop reason: HOSPADM

## 2025-03-23 RX ORDER — LEVETIRACETAM 500 MG/1
500 TABLET ORAL 2 TIMES DAILY
Status: DISCONTINUED | OUTPATIENT
Start: 2025-03-23 | End: 2025-03-30 | Stop reason: HOSPADM

## 2025-03-23 RX ORDER — METHOCARBAMOL 500 MG/1
500 TABLET, FILM COATED ORAL DAILY PRN
Status: DISCONTINUED | OUTPATIENT
Start: 2025-03-23 | End: 2025-03-30 | Stop reason: HOSPADM

## 2025-03-23 RX ORDER — LIDOCAINE 40 MG/G
CREAM TOPICAL
Status: DISCONTINUED | OUTPATIENT
Start: 2025-03-23 | End: 2025-03-30 | Stop reason: HOSPADM

## 2025-03-23 RX ORDER — AMOXICILLIN 250 MG
1 CAPSULE ORAL 2 TIMES DAILY PRN
Status: DISCONTINUED | OUTPATIENT
Start: 2025-03-23 | End: 2025-03-30 | Stop reason: HOSPADM

## 2025-03-23 RX ADMIN — GABAPENTIN 100 MG: 100 CAPSULE ORAL at 22:24

## 2025-03-23 RX ADMIN — CARVEDILOL 12.5 MG: 3.12 TABLET, FILM COATED ORAL at 18:30

## 2025-03-23 RX ADMIN — CEFTRIAXONE SODIUM 1 G: 1 INJECTION, POWDER, FOR SOLUTION INTRAMUSCULAR; INTRAVENOUS at 13:28

## 2025-03-23 RX ADMIN — CARVEDILOL 12.5 MG: 3.12 TABLET, FILM COATED ORAL at 12:37

## 2025-03-23 RX ADMIN — ATORVASTATIN CALCIUM 40 MG: 20 TABLET, FILM COATED ORAL at 16:02

## 2025-03-23 RX ADMIN — INSULIN ASPART 4 UNITS: 100 INJECTION, SOLUTION INTRAVENOUS; SUBCUTANEOUS at 16:44

## 2025-03-23 RX ADMIN — LORATADINE 10 MG: 10 TABLET ORAL at 16:10

## 2025-03-23 RX ADMIN — INSULIN GLARGINE 10 UNITS: 100 INJECTION, SOLUTION SUBCUTANEOUS at 22:24

## 2025-03-23 RX ADMIN — ACETAMINOPHEN 975 MG: 325 TABLET, FILM COATED ORAL at 15:56

## 2025-03-23 RX ADMIN — LEVETIRACETAM 500 MG: 500 TABLET, FILM COATED ORAL at 20:19

## 2025-03-23 RX ADMIN — CYANOCOBALAMIN TAB 500 MCG 1000 MCG: 500 TAB at 16:02

## 2025-03-23 RX ADMIN — LEVETIRACETAM 500 MG: 500 TABLET, FILM COATED ORAL at 15:54

## 2025-03-23 RX ADMIN — DULOXETINE HYDROCHLORIDE 20 MG: 20 CAPSULE, DELAYED RELEASE ORAL at 16:10

## 2025-03-23 ASSESSMENT — ACTIVITIES OF DAILY LIVING (ADL)
ADLS_ACUITY_SCORE: 64
ADLS_ACUITY_SCORE: 64
ADLS_ACUITY_SCORE: 58
ADLS_ACUITY_SCORE: 58
ADLS_ACUITY_SCORE: 64
ADLS_ACUITY_SCORE: 58
ADLS_ACUITY_SCORE: 60
ADLS_ACUITY_SCORE: 64
ADLS_ACUITY_SCORE: 58

## 2025-03-23 NOTE — LETTER
STROKE CODE CALLED BY DR ABEL @12:44PM LL Transition Communication Hand-off for Care Transitions to Next Level of Care Provider    Name: Isabell Matthews  : 1960  MRN #: [id number]  Primary Care Provider: Ronnie Kellogg     Primary Clinic: [address] [place] [id number]     Reason for Hospitalization:  Elevated troponin [R79.89]  Acute cystitis without hematuria [N30.00]  Wrist fracture, left, closed, initial encounter [S62.102A]  Admit Date/Time: 3/23/2025  9:29 AM  Discharge Date: 2025  Payor Source: Payor: UCARE / Plan: [facility] [place] / Product Type: HMO /     Discharge Plan: Home with home care       Concern for non-adherence with plan of care:   Y/N N  Discharge Needs Assessment:  Needs      Flowsheet Row Most Recent Value   Equipment Currently Used at Home walker, standard, wheelchair, manual   # of Referrals Placed by [facility] Home[facility]            Follow-up plan:    Future Appointments   Date Time Provider Department Center   3/31/2025  1:15 PM Roseanne Wolff, OT [facility]   3/31/2025  3:15 PM Catherine Brown, PT [facility] O   2025  2:20 PM Santana Reilly, LE[facility]   2025  4:15 PM [id number]   2025  4:30 PM Lorin Baker MD [facility]   10/20/2025  3:00 PM Marshall Medina MD [facility]       Any outstanding tests or procedures:        Referrals       Future Labs/Procedures    Primary Care - Care Coordination Referral     Process Instructions:    Services are provided by a Care Coordinator for people with complex needs such as: medical, social, or financial troubles.  The Care Coordinator works with the patient and their Primary Care Provider to determine health goals, obtain resources, achieve outcomes, and develop care plans that help coordinate the patient's care.     Comments:         Adult Endocrinology  Referral     Process Instructions:    Hemoglobin A1C       Date                     Value               Ref Range           Status                 03/23/2025               8.8 (H)             <5.7 %              Final              Comment:    Normal <5.7%     Prediabetes 5.7-6.4%      Diabetes 6.5% or higher         Note: Adopted from ADA consensus guidelines.       06/21/2021               7.8 (H)             0 - 5.6 %           Final              Comment:    Normal <5.7% Prediabetes 5.7-6.4%  Diabetes 6.5% or higher - adopted from ADA     consensus guidelines.      ----------  Gestational Diabetes Mellitus patients MUST have an appt with Diabetes Educators prior to visit with Endocrinologist. Please enter URGENT Adult Diabetes Education  Referral ([phone]) in addition to the Endocrinology referral.    Consider Adult E-Consult to Endocrinology for the following conditions: abnormal thyroid function test, adrenal insufficiency, adrenal mass, diabetes mellitus, elevated parathyroid hormone, hypercalcemia, hyperprolactinemia, hypoglycemia, hypogonadism or low testosterone, hypothyroidism, osteoporosis, polycystic ovarian syndrome (PCOS), thyroid nodule/goiter.  E-Consult Cost: 0-$125.      Comments:    Please be aware that coverage of these services is subject to the terms and limitations of your health insurance plan.  Call member services at your health plan with any benefit or coverage questions.  [employer] will call you to coordinate your care as prescribed by the provider.  If you don t hear from a representative within 2 business days, please call [phone].    Occupational Therapy  Referral     Process Instructions:    If ordering DME please utilize the DME ordering process. If you are ordering services for pediatric feeding, please utilize order: Speech Therapy  Referral [0290]    Comments:    Please be aware that coverage of these services is subject to the terms and limitations of your health insurance plan.  Call member services at your health plan with any benefit or coverage questions.  [employer] will  STROKE CODE CALLED BY SECURITY MAIN ENTRANCE @12:40 M LL "call you to coordinate your care as prescribed by your provider. If you don't hear from a representative within 2 business days, please call [phone].    Physical Therapy  Referral     Process Instructions:    If ordering DME, please utilize the DME ordering process. If ordering services for Hand Therapy, please utiize order: Occupational Therapy  Referral [[id number]] and select \"Hand Therapy\" under Specialty Services.    Comments:    Please be aware that coverage of these services is subject to the terms and limitations of your health insurance plan.  Call member services at your health plan with any benefit or coverage questions.  [facility] will call you to coordinate your care as prescribed by your provider. If you don't hear from a representative within 2 business days, please call [phone].    Primary Care - Care Coordination Referral     Process Instructions:    Services are provided by a Care Coordinator for people with complex needs such as: medical, social, or financial troubles.  The Care Coordinator works with the patient and their Primary Care Provider to determine health goals, obtain resources, achieve outcomes, and develop care plans that help coordinate the patient's care.     Comments:         Primary Care Referral     Comments:    Please be aware that coverage of these services is subject to the terms and limitations of your health insurance plan.  Call member services at your health plan with any benefit or coverage questions.    Home Care Referral                     ROBIN Farfan    AVS/Discharge Summary is the source of truth; this is a helpful guide for improved communication of patient story          "

## 2025-03-23 NOTE — H&P
Resident/Fellow Attestation   I, Colton Mata MD, was present with the medical/BULMARO student who participated in the service and in the documentation of the note.  I have verified the history and personally performed the physical exam and medical decision making.  I agree with the assessment and plan of care as documented in the note.      Colton Mata MD  PGY1  Date of Service (when I saw the patient): 03/23/25    [facility]    [facility] and [facility]       Date of Admission:  3/23/2025    Assessment & Plan      Isabell Matthews is a 64 year old female admitted on 3/23/2025. She has a notable history of frequent falls, recurrent UTI, vascular dementia, CVA (most recent 06/2024), HTN, HLD, CKD IIIb, T1DM c/b neuropathy, and seizures. Was admitted for dizziness and falls, found to have L wrist fx. Remains admitted for further workup and surgical consultation.     AMS?  Dizziness  Recurrent falls  Presenting after an unwitnessed fall morning of 3/23. Was found on floor by pt's daughter noting pain in L side and bleeding from face. Unclear what pt's baseline cognitive status is. Currently is alert and somewhat oriented to place but not to time or self. This appears different from Neuro exam noted at Neurology visit in October 2024 but on discussion with pt's daughter over the phone she reports that Isabell's mentation is unchanged. Has slowed speech but is able to answer questions appropriately and follow commands. Non-con CT head without acute intracranial pathology or fracture. No focal neuro deficits. CXR without infectious signs. EKG sinus rhythm without ST or T wave changes. Normal carotid vasculature in 06/2024. Dizziness may be 2/2 infection, medication s/e, orthostatic hypotension, progression of vascular disease, or arrhythmia. Feel that stroke, seizure,   - Orthostatic BP measurement  - OT/PT evaluation  - RVP  - Bcx obtained and pending  (obtained after CTX was given)  - 24 hours of Telemetry; consider Zio patch on discharge  - May need to discuss higher level of care on discharge    L wrist fx  L periorbital Hematoma  2/2 fall as above. Imaging in ED showing acute, comminuted, and impacted fracture of L distal radius. No facial fracture.   - Ortho consult  - Multimodal pain management    Bacturia with possible UTI  Pt has a history of recurrent UTIs. Most recently pan-sensitive E.coli in January 2025. Does not report any sx on admission but UA positive for nitrites and some bacteria. No LE or WBC. S/p 1 dose CTX in ED.   - Ucx pending  - Continue CTX iso AMS, falls, and possible UTI    DM1  A1c of 9.6% on January 2025. BS in 300s on admission.   - A1c ordered  - Home regimen:  LDSSI  Lantus 20u at bedtime  2u mealtime with additional correction scale   - In hospital:  Hypoglycemia protocol  Start 10u Lantus and MDSSI    CKD3b  Baseline Cr of 1.5. Cr on admission of 1.5.   - CTM    HTN  HLD  BP on admission of 203, 140 on repeat after treatment of pain.   - Continue Atorvastatin 40mg  - Holding Amlodipine 5mg  - Holding Carvedilol 12.5mg BID    Prior CVAs (ischemic and hemorrhagic)  Vascular dementia  No focal deficits on exam today.   - Consider Neurology consult in morning  - Holding ASA pending surgical plan  - Recommend getting MOCA once pt is past possible acute infection/recrudesence period and closer to discharge    H/o seizures  Reported history of seizures iso DKA and strokes. None reported in last 4 years  - Continue Keppra 500mg BID     Chronic Normocytic Anemia  Baseline Hgb of 10. Hgb on admission of 10.5.   - B12, Folate, Iron panel, Ferritin    Troponin elevation  Troponin 49 on admission, 46 on repeat. Iso CKD. No cardiac sx. Normal EKG.     Diet: NPO at MN  DVT Prophylaxis: Mechanical pending surgical plan  Parker Catheter: Not present  Fluids: NPO  Lines: None     Cardiac Monitoring: None  Code Status:  DNR/DNI    Clinically  Significant Risk Factors Present on Admission                 # Drug Induced Platelet Defect: home medication list includes an antiplatelet medication   # Hypertension: Noted on problem list     # Dementia: noted on problem list  # Anemia: based on hgb <11           # Financial/Environmental Concerns:           Disposition Plan      Expected Discharge Date: 03/25/2025                The patient's care was discussed with the Attending Physician, Dr. Joshua .      Anthony Oscar  Medical Student  Medicine Service, [address]  Securely message with [email or web address])  Text page via [facility] Paging/Directory   See signed in provider for up to date coverage information  _____________________________________________________________________  Chief Complaint   Evaluation after fall    History is obtained from the patient    History of Present Illness   Isabell Matthews is a 64 year old female with a notable history of frequent falls, vascular dementia, CVA (most recent 06/2024), HTN, HLD, CKD IIIb, T1DM c/b neuropathy, and seizures who was admitted from the [place] ED after an unwitnessed fall. Isabell reports using a walker at baseline, and was not using it today when she fell. She reports hitting her head during the fall with pain to the left side of her face, left wrist, and left hip. No LOC. Not on a blood thinner. Denies urinary sx, SOB, cough, f/c/s, visual changes, n/v/d,     Notes that she has been having increased dizziness for the last 2 months. States that this dizziness generally occurs when going from sitting to standing. Describes this as lightheadedness. Gets some associated SOB but no chest pain, n/v, or vertiginous sx. Reports good oral intake.     On discussion with pt's daughter, Vishal, she reports that Isabell has been having more frequent falls recently, most notably in last 2 weeks. She heard a thump this morning at 0830 and found Isabell on the  ground bleeding significantly from the L side of her face, her walker was not around her. Feels like Isabell is at her baseline mentation and has not noticed any worsening confusion, memory, or focal deficits.     Reports smoking 1.5 PPD quit smoking 1-3 months ago. Denies alcohol use or recreational drug use.     Past Medical History    Past Medical History:   Diagnosis Date    Chronic pain     Coronary atherosclerosis 6/14/2016    Essential hypertension     Ex-cigarette smoker     quit 7/2018 over 35 years    Ex-cigarette smoker     Hyperlipidemia     Hypothyroid     Seizures (H)     Stroke (H)     Vascular dementia (H)        Past Surgical History   Past Surgical History:   Procedure Laterality Date    athroplasty hip Bilateral     2003, 2006    OTHER SURGICAL HISTORY      athroplasty hip    PICC DOUBLE LUMEN PLACEMENT Right 06/11/2023    right basilic 5 fr dl power picc 39 cm    STENT         Prior to Admission Medications   Prior to Admission Medications   Prescriptions Last Dose Informant Patient Reported? Taking?   Continuous Glucose Sensor (FREESTYLE MARIA FERNANDA 2 SENSOR) MISC   No No   Sig: Change every 14 days.   DULoxetine (CYMBALTA) 20 MG capsule 3/22/2025 Bedtime  No Yes   Sig: TAKE 1 CAPSULE(20 MG) BY MOUTH DAILY   Glucagon (GVOKE HYPOPEN) 1 MG/0.2ML pen   No No   Sig: Inject the contents of 1 device under the skin into lower abdomen, outer thigh, or outer upper arm as needed for hypoglycemia. If no response after 15 minutes, additional 1 mg dose from a new device may be injected while waiting for emergency assistance.   PENTIPS 32G X 4 MM miscellaneous   Yes No   Sig: Inject 1 each subcutaneously daily. Use 4 pen needles daily or as directed.   SYNTHROID 75 MCG tablet 3/23/2025 Morning  No Yes   Sig: Take 1 tablet (75 mcg) by mouth daily.   acetaminophen (TYLENOL) 325 MG tablet Past Week  No Yes   Sig: Take 3 tablets (975 mg) by mouth every 8 hours.   amLODIPine (NORVASC) 5 MG tablet More than a month  No Yes    Sig: Take 1 tablet (5 mg) by mouth daily.   aspirin (ASA) 81 MG chewable tablet 3/22/2025  No Yes   Sig: Take 1 tablet (81 mg) by mouth daily   atorvastatin (LIPITOR) 40 MG tablet 3/22/2025 Morning  No Yes   Sig: Take 1 tablet (40 mg) by mouth daily.   blood glucose (NO BRAND SPECIFIED) test strip   No No   Sig: Use to test blood sugar 8 times daily or as directed.   blood glucose (NO BRAND SPECIFIED) test strip   No No   Sig: Use to test blood sugar 4 times daily or as directed.   blood glucose (TRUE METRIX BLOOD GLUCOSE TEST) test strip   No No   Sig: USE TO TEST BLOOD SUGAR 4 TO 5 TIMES DAILY OR AS DIRECTED   blood glucose monitoring (NO BRAND SPECIFIED) meter device kit  Daughter No No   Sig: Use to test blood sugar 4 times daily.  Please provide glucose meter that is covered by insurance.   blood glucose monitoring (ONE TOUCH ULTRA MINI) meter device kit   No No   Sig: Use to test blood sugar 4 times daily or as directed.   carvedilol (COREG) 12.5 MG tablet 3/22/2025 Evening  No Yes   Sig: Take 1 tablet (12.5 mg) by mouth 2 times daily (with meals).   chlorhexidine (PERIDEX) 0.12 % solution   No No   Sig: Take 30 mLs by mouth daily. Swish and spit 30 mL once daily for 14 days.Swish and spit 30 mL once daily for 14 days.   cyanocobalamin (VITAMIN B-12) 1000 MCG tablet 3/22/2025 Morning  No Yes   Sig: Take 1 tablet (1,000 mcg) by mouth daily   gabapentin (NEURONTIN) 100 MG capsule 3/22/2025 Bedtime  No Yes   Sig: Take 1 capsule (100 mg) by mouth at bedtime.   insulin aspart (NOVOLOG FLEXPEN) 100 UNIT/ML pen   No No   Sig: Inject 2 units with meals plus correction scale additional three times daily before meals  as follows  Pre-Meal  - 200 give additional 1 unit.  For Pre-Meal  -250 give 2 additional units.  For Pre-Meal -300 give 3 additional units.  For Pre-Meal -350 give 4 additional units.  For Pre-Meal -400 give 5 additional units Max 25 units daily   insulin aspart (NOVOLOG  PENFILL) 100 UNIT/ML cartridge   No No   Sig: Inject 2-4 Units Subcutaneous 4 times daily (with meals and nightly) diabetes   insulin glargine (LANTUS PEN) 100 UNIT/ML pen 3/22/2025 Bedtime  No Yes   Sig: Inject 19 Units subcutaneously at bedtime.   Patient taking differently: Inject 20 Units subcutaneously at bedtime.   levETIRAcetam (KEPPRA) 500 MG tablet 3/22/2025 Evening  No Yes   Sig: Take 1 tablet (500 mg) by mouth 2 times daily.   loratadine (CLARITIN) 10 MG tablet 3/22/2025 Evening  No Yes   Sig: Take 1 tablet (10 mg) by mouth daily.   methocarbamol (ROBAXIN) 500 MG tablet More than a month  No Yes   Sig: Take 1 tablet (500 mg) by mouth daily as needed for muscle spasms.   zinc oxide (DESITIN) 20 % external ointment   No No   Sig: Apply topically as needed for dry skin or irritation      Facility-Administered Medications: None        Review of Systems    The 10 point Review of Systems is negative other than noted in the HPI or here.     Social History   I have reviewed this patient's social history and updated it with pertinent information if needed.  Social History     Tobacco Use    Smoking status: Former     Current packs/day: 0.00     Average packs/day: 0.5 packs/day for 35.0 years (17.5 ttl pk-yrs)     Types: Cigarettes     Start date: 1983     Quit date: 2018     Years since quittin.7    Smokeless tobacco: Never   Substance Use Topics    Alcohol use: Not Currently    Drug use: Not Currently         Family History   I have reviewed this patient's family history and updated it with pertinent information if needed.  Family History   Problem Relation Age of Onset    Acute Myocardial Infarction Father     Heart Disease Maternal Grandmother     Dementia Maternal Grandmother     Myocardial Infarction Father     Dementia Paternal Grandmother     Heart Disease Paternal Grandmother         Physical Exam   Vital Signs: Temp: 98.1  F (36.7  C) Temp src: Oral BP: (!) 203/81 Pulse: 81   Resp: 16 SpO2: 98  % O2 Device: None (Room air)    Weight: 0 lbs 0 oz    General: Alert. Laying in bed comfortably. NAD. Well developed and nourished.  HEENT: NC, MMM, EOMI with 1 mm pupils bilaterally. Left periorbital swelling with ecchymosis. Chronic Clear conjunctivae. No midline neck tenderness. No paracervical muscular tenderness.   Pulmonary: CTAB, No wheezes/rhonchi/rales, Normal inspiratory effort.   Abdominal: Soft, non-distended, diffuse tenderness. No bruising. Normal bowel sounds.  Extremities: Obvious left wrist deformity with TTP. Left inside hip TTP radiating down into the groin.  Skin: Clean, warm, dry, intact  Neuro: A&O to self and somewhat to place (knows ER but not specifically), not time. Paucity of speech. CII-CXII intact. Moving all extremities.  Strength in right hand, and bilateral LEs 5/5. Decreased strength in L hand 2/2 pain.       Data   Imaging results reviewed over the past 24 hrs:   Recent Results (from the past 24 hours)   CT Cervical Spine w/o Contrast    Narrative    EXAM: CT CERVICAL SPINE W/O CONTRAST  3/23/2025 10:44 AM     HISTORY: fall, r/o traumatic injury       COMPARISON: Cervical spine CT 1/17/2025    TECHNIQUE: Using multidetector thin collimation helical acquisition  technique, axial, coronal and sagittal CT images through the cervical  spine were obtained without intravenous contrast.    FINDINGS:  Grade 1 anterolisthesis of C3 on C4 and C7 on T1. No acute fracture or  traumatic subluxation. No prevertebral edema. Multilevel  intervertebral disc height loss. Multilevel endplate osteophytosis,  uncinate hypertrophy, and bilateral facet arthropathy. Mild to  moderate associated spinal canal narrowing at C4-5 and C5-6. Moderate  neural foraminal narrowing on the right at C3-4, on the right at C4-5,  bilaterally at C5-6, and on the left at C6-7.     No abnormality of the paraspinous soft tissues. Bilateral carotid  atherosclerosis.    Mosaic attenuation of the lung parenchyma, likely related  to degree of  inspiration.      Impression    IMPRESSION:  1. No acute fracture or traumatic subluxation.  2. Multilevel cervical spondylosis, most pronounced at C4-5 and C5-6.  No high-grade spinal canal or neuroforaminal stenosis.    I have personally reviewed the examination and initial interpretation  and I agree with the findings.    GREG QUEVEOD MD         SYSTEM ID:  [id number]   CT Head w/o Contrast    Narrative    EXAM: CT HEAD W/O CONTRAST  3/23/2025 10:45 AM     HISTORY: fall, r/o traumatic injury       COMPARISON: Head CT 1/17/2025. Brain MRI 6/16/2024.    TECHNIQUE: Using multidetector thin collimation helical acquisition  technique, axial, coronal and sagittal CT images from the skull base  to the vertex were obtained without intravenous contrast.   (topogram) image(s) also obtained and reviewed.    FINDINGS:  No acute intracranial hemorrhage, mass effect, or midline shift.  Hypodensities in the bilateral thalami and basal ganglia, unchanged.  Diffuse periventricular white matter hypoattenuation. Moderate diffuse  cerebral and cerebellar volume loss. No acute loss of gray-white  matter differentiation in the cerebral hemispheres. Ventricles are  proportionate to the cerebral sulci. Clear basal cisterns. Small  locules of dural fat.    Left periorbital contusion with small skin defect. The bony calvaria  and the bones of the skull base are normal. Moderate polypoid left  maxillary sinus mucosal thickening. Mastoid air cells are clear.  Bilateral pseudophakia.      Impression    IMPRESSION:   1. No acute intracranial pathology.   2. Left periorbital soft tissue contusion without underlying fracture.  3. Moderate cerebral atrophy, sequelae of chronic small vessel  ischemic disease, and old lacunar infarcts.    I have personally reviewed the examination and initial interpretation  and I agree with the findings.    GREG QUEVEDO MD         SYSTEM ID:  [id number]   CT Facial Bones without Contrast     Narrative    EXAM: CT FACIAL BONES WITHOUT CONTRAST  3/23/2025 10:45 AM     HISTORY: fall, r/o traumatic injury       COMPARISON: Same day head CT. 1/17/2025      TECHNIQUE: Using thin collimation multidetector helical acquisition  technique, axial, sagittal, and coronal thin section CT images were  reconstructed through the facial bones. Images were reviewed in bone  and soft tissue windows.    FINDINGS:   Left superior periorbital hematoma with soft tissue swelling at its  periphery. Additionally there is mild left buccal region soft tissue  swelling. No underlying fracture is suspected. No post septal  abnormality. Intact bony alignment. Intact cribriform plate. Bilateral  pseudophakia. Otherwise normal orbital structures.    Moderate polypoid left maxillary sinus mucosal thickening. Minimal  right maxillary sinus mucosal thickening. Mild ethmoid mucosal  thickening. Right sphenoid mucosal thickening and frothy debris.  Mastoid air cells clear. Debris in the right external auditory canal,  presumed cerumen.    Extensive dental caries. Small periapical lucencies involving several  mandibular and maxillary teeth.     Cerebral and cerebellar atrophy.      Impression    IMPRESSION:   1. Left periorbital hematoma.   2. No acute facial bone fracture.    I have personally reviewed the examination and initial interpretation  and I agree with the findings.    GREG QUEVEDO MD         SYSTEM ID:  [id number]   XR Chest 2 Views    Narrative    EXAM: XR CHEST 2 VIEWS  3/23/2025 11:36 AM     HISTORY:  fall, r/o traumatic injury       COMPARISON:  CT chest abdomen and pelvis 1/16/2025    FINDINGS:     AP and lateral upright views of the chest.    Trachea is midline. Cardiomediastinal silhouette is slightly enlarged.  No focal airspace opacity, pleural effusion or appreciable  pneumothorax.    No acute osseous abnormality. Visualized upper abdomen is  unremarkable.  Soft tissues within normal limits      Impression     IMPRESSION:   1. No acute traumatic injury visualized.  2. No acute cardiopulmonary disease.     I have personally reviewed the examination and initial interpretation  and I agree with the findings.    SANDY BRYANT MD         SYSTEM ID:  [id number]   Thoracic spine XR, 3 views    Narrative    EXAM: XR THORACIC SPINE 3 VIEWS  LOCATION: [facility]  DATE: 3/23/2025    INDICATION: fall, r o traumatic injury  COMPARISON: 01/16/2025      Impression    IMPRESSION: The vertebral body heights of the thoracic spine appear preserved on the anterior projection but are not diagnostically observed on the lateral projection. Lung volumes are low. Soft tissues of the upper abdomen are unremarkable.   Lumbar spine XR, 2-3 views    Narrative    EXAM: XR LUMBAR SPINE 2/3 VIEWS  LOCATION: [facility]  DATE: 3/23/2025    INDICATION: fall, r o traumatic injury  COMPARISON: 01/16/2025      Impression    IMPRESSION: Normal vertebral heights and alignment. No severe disc height loss or facet arthropathy. Atherosclerotic calcifications of the abdominal aorta. Bilateral hip arthroplasty.   XR Hand Left G/E 3 Views    Narrative    EXAM: XR FOREARM LEFT 2 VIEWS, XR ELBOW LEFT 2 VIEWS, XR WRIST LEFT G/E 3 VIEWS, XR HAND LEFT G/E 3 VIEWS  LOCATION: [facility]  DATE: 3/23/2025    INDICATION: fall, r o traumatic injury  COMPARISON: None.      Impression    IMPRESSION: Acute, comminuted and impacted fracture of the left distal radius with intra-articular extension to the radiocarpal joint. Dorsal tilt of the distal radial articular surface. Tiny, minimally displaced ulnar styloid tip fracture. Soft tissue   swelling about the wrist.    No additional fracture of the left elbow, forearm, or hand. No elbow joint effusion. Demineralization. Moderate degenerative arthritis first CMC and STT joints.   XR Wrist Left G/E  3 Views    Narrative    EXAM: XR FOREARM LEFT 2 VIEWS, XR ELBOW LEFT 2 VIEWS, XR WRIST LEFT G/E 3 VIEWS, XR HAND LEFT G/E 3 VIEWS  LOCATION: [facility]  DATE: 3/23/2025    INDICATION: fall, r o traumatic injury  COMPARISON: None.      Impression    IMPRESSION: Acute, comminuted and impacted fracture of the left distal radius with intra-articular extension to the radiocarpal joint. Dorsal tilt of the distal radial articular surface. Tiny, minimally displaced ulnar styloid tip fracture. Soft tissue   swelling about the wrist.    No additional fracture of the left elbow, forearm, or hand. No elbow joint effusion. Demineralization. Moderate degenerative arthritis first CMC and STT joints.   Radius/Ulna XR,  PA &LAT, left    Narrative    EXAM: XR FOREARM LEFT 2 VIEWS, XR ELBOW LEFT 2 VIEWS, XR WRIST LEFT G/E 3 VIEWS, XR HAND LEFT G/E 3 VIEWS  LOCATION: [facility]  DATE: 3/23/2025    INDICATION: fall, r o traumatic injury  COMPARISON: None.      Impression    IMPRESSION: Acute, comminuted and impacted fracture of the left distal radius with intra-articular extension to the radiocarpal joint. Dorsal tilt of the distal radial articular surface. Tiny, minimally displaced ulnar styloid tip fracture. Soft tissue   swelling about the wrist.    No additional fracture of the left elbow, forearm, or hand. No elbow joint effusion. Demineralization. Moderate degenerative arthritis first CMC and STT joints.   XR Elbow Left 2 Views    Narrative    EXAM: XR FOREARM LEFT 2 VIEWS, XR ELBOW LEFT 2 VIEWS, XR WRIST LEFT G/E 3 VIEWS, XR HAND LEFT G/E 3 VIEWS  LOCATION: [facility]  DATE: 3/23/2025    INDICATION: fall, r o traumatic injury  COMPARISON: None.      Impression    IMPRESSION: Acute, comminuted and impacted fracture of the left distal radius with intra-articular extension to the radiocarpal  joint. Dorsal tilt of the distal radial articular surface. Tiny, minimally displaced ulnar styloid tip fracture. Soft tissue   swelling about the wrist.    No additional fracture of the left elbow, forearm, or hand. No elbow joint effusion. Demineralization. Moderate degenerative arthritis first CMC and STT joints.

## 2025-03-23 NOTE — ED NOTES
Bed: [facility]  Expected date: 3/23/25  Expected time: 9:23 AM  Means of arrival: Ambulance  Comments:  Sp 20  64F, fall, hit head, laceration, dementia

## 2025-03-23 NOTE — ED PROVIDER NOTES
[facility] ([facility])    3/23/25       ED PROVIDER NOTE    History     Chief Complaint   Patient presents with    Fall     HPI  Isabell Matthews is a 64 year old female with a notable history of vascular dementia, frequent falls, CVA, HTN, HLD, CKD IIIb, T1DM c/b neuropathy, ischemic and hemorrhagic strokes and seizures who presents for evaluation following a fall.     Patient reports she fell trying to get on a bike. Has pain on left side of face, headache pain at 9 out of 10. Has left hand, wrist and elbow pain. Left hip pain. Back pain on palpation. Denies jaw pain, denies right arm pain.    Past Medical History  Past Medical History:   Diagnosis Date    Chronic pain     Coronary atherosclerosis 6/14/2016    Essential hypertension     Ex-cigarette smoker     quit 7/2018 over 35 years    Ex-cigarette smoker     Hyperlipidemia     Hypothyroid     Seizures (H)     Stroke (H)     Vascular dementia (H)      Past Surgical History:   Procedure Laterality Date    athroplasty hip Bilateral     2003, 2006    OTHER SURGICAL HISTORY      athroplasty hip    PICC DOUBLE LUMEN PLACEMENT Right 06/11/2023    right basilic 5 fr dl power picc 39 cm    STENT       acetaminophen (TYLENOL) 325 MG tablet  amLODIPine (NORVASC) 5 MG tablet  aspirin (ASA) 81 MG chewable tablet  atorvastatin (LIPITOR) 40 MG tablet  carvedilol (COREG) 12.5 MG tablet  cyanocobalamin (VITAMIN B-12) 1000 MCG tablet  DULoxetine (CYMBALTA) 20 MG capsule  gabapentin (NEURONTIN) 100 MG capsule  insulin glargine (LANTUS PEN) 100 UNIT/ML pen  levETIRAcetam (KEPPRA) 500 MG tablet  loratadine (CLARITIN) 10 MG tablet  methocarbamol (ROBAXIN) 500 MG tablet  SYNTHROID 75 MCG tablet  blood glucose (NO BRAND SPECIFIED) test strip  blood glucose (NO BRAND SPECIFIED) test strip  blood glucose (TRUE METRIX BLOOD GLUCOSE TEST) test strip  blood glucose monitoring (NO BRAND SPECIFIED) meter device kit  blood glucose monitoring (ONE TOUCH ULTRA MINI)  meter device kit  chlorhexidine (PERIDEX) 0.12 % solution  Continuous Glucose Sensor (FREESTYLE MARIA FERNANDA 2 SENSOR) MISC  Glucagon (GVOKE HYPOPEN) 1 MG/0.2ML pen  insulin aspart (NOVOLOG FLEXPEN) 100 UNIT/ML pen  insulin aspart (NOVOLOG PENFILL) 100 UNIT/ML cartridge  PENTIPS 32G X 4 MM miscellaneous  zinc oxide (DESITIN) 20 % external ointment      Allergies   Allergen Reactions    Seasonal Allergies      Family History  Family History   Problem Relation Age of Onset    Acute Myocardial Infarction Father     Heart Disease Maternal Grandmother     Dementia Maternal Grandmother     Myocardial Infarction Father     Dementia Paternal Grandmother     Heart Disease Paternal Grandmother      Social History   Social History     Tobacco Use    Smoking status: Former     Current packs/day: 0.00     Average packs/day: 0.5 packs/day for 35.0 years (17.5 ttl pk-yrs)     Types: Cigarettes     Start date: 1983     Quit date: 2018     Years since quittin.7    Smokeless tobacco: Never   Substance Use Topics    Alcohol use: Not Currently    Drug use: Not Currently      Past medical history, past surgical history, medications, allergies, family history, and social history were reviewed with the patient. No additional pertinent items.     A complete review of systems was performed with pertinent positives and negatives noted in the HPI, and all other systems negative.    Physical Exam   BP: (!) 182/69  Pulse: 81  Temp: 98.1  F (36.7  C)  Resp: 16  SpO2: 98 %    Physical Exam  Vitals and nursing note reviewed.   Constitutional:       General: She is not in acute distress.     Appearance: Normal appearance. She is not diaphoretic.   HENT:      Head: Atraumatic.      Mouth/Throat:      Mouth: Mucous membranes are moist.   Eyes:      General: No scleral icterus.     Conjunctiva/sclera: Conjunctivae normal.   Cardiovascular:      Rate and Rhythm: Normal rate and regular rhythm.      Heart sounds: Normal heart sounds.   Pulmonary:       Effort: No respiratory distress.      Breath sounds: Normal breath sounds. No wheezing, rhonchi or rales.   Abdominal:      General: Abdomen is flat. Bowel sounds are normal.      Tenderness: There is no abdominal tenderness. There is no guarding or rebound.   Musculoskeletal:      Cervical back: Neck supple.   Skin:     General: Skin is warm.      Findings: No rash.   Neurological:      General: No focal deficit present.      Mental Status: She is alert and oriented to person, place, and time.      Cranial Nerves: No cranial nerve deficit.      Sensory: No sensory deficit.      Motor: No weakness.   Psychiatric:         Mood and Affect: Mood normal.         Behavior: Behavior normal.       ED Course, Procedures, & Data      Procedures          Results for orders placed or performed during the hospital encounter of 03/23/25   CT Head w/o Contrast     Status: None    Narrative    EXAM: CT HEAD W/O CONTRAST  3/23/2025 10:45 AM     HISTORY: fall, r/o traumatic injury       COMPARISON: Head CT 1/17/2025. Brain MRI 6/16/2024.    TECHNIQUE: Using multidetector thin collimation helical acquisition  technique, axial, coronal and sagittal CT images from the skull base  to the vertex were obtained without intravenous contrast.   (topogram) image(s) also obtained and reviewed.    FINDINGS:  No acute intracranial hemorrhage, mass effect, or midline shift.  Hypodensities in the bilateral thalami and basal ganglia, unchanged.  Diffuse periventricular white matter hypoattenuation. Moderate diffuse  cerebral and cerebellar volume loss. No acute loss of gray-white  matter differentiation in the cerebral hemispheres. Ventricles are  proportionate to the cerebral sulci. Clear basal cisterns. Small  locules of dural fat.    Left periorbital contusion with small skin defect. The bony calvaria  and the bones of the skull base are normal. Moderate polypoid left  maxillary sinus mucosal thickening. Mastoid air cells are  clear.  Bilateral pseudophakia.      Impression    IMPRESSION:   1. No acute intracranial pathology.   2. Left periorbital soft tissue contusion without underlying fracture.  3. Moderate cerebral atrophy, sequelae of chronic small vessel  ischemic disease, and old lacunar infarcts.    I have personally reviewed the examination and initial interpretation  and I agree with the findings.    GREG QUEVEDO MD         SYSTEM ID:  [id number]   CT Cervical Spine w/o Contrast     Status: None    Narrative    EXAM: CT CERVICAL SPINE W/O CONTRAST  3/23/2025 10:44 AM     HISTORY: fall, r/o traumatic injury       COMPARISON: Cervical spine CT 1/17/2025    TECHNIQUE: Using multidetector thin collimation helical acquisition  technique, axial, coronal and sagittal CT images through the cervical  spine were obtained without intravenous contrast.    FINDINGS:  Grade 1 anterolisthesis of C3 on C4 and C7 on T1. No acute fracture or  traumatic subluxation. No prevertebral edema. Multilevel  intervertebral disc height loss. Multilevel endplate osteophytosis,  uncinate hypertrophy, and bilateral facet arthropathy. Mild to  moderate associated spinal canal narrowing at C4-5 and C5-6. Moderate  neural foraminal narrowing on the right at C3-4, on the right at C4-5,  bilaterally at C5-6, and on the left at C6-7.     No abnormality of the paraspinous soft tissues. Bilateral carotid  atherosclerosis.    Mosaic attenuation of the lung parenchyma, likely related to degree of  inspiration.      Impression    IMPRESSION:  1. No acute fracture or traumatic subluxation.  2. Multilevel cervical spondylosis, most pronounced at C4-5 and C5-6.  No high-grade spinal canal or neuroforaminal stenosis.    I have personally reviewed the examination and initial interpretation  and I agree with the findings.    GREG QUEVEDO MD         SYSTEM ID:  [id number]   XR Chest 2 Views     Status: None    Narrative    EXAM: XR CHEST 2 VIEWS  3/23/2025 11:36 AM      HISTORY:  fall, r/o traumatic injury       COMPARISON:  CT chest abdomen and pelvis 1/16/2025    FINDINGS:     AP and lateral upright views of the chest.    Trachea is midline. Cardiomediastinal silhouette is slightly enlarged.  No focal airspace opacity, pleural effusion or appreciable  pneumothorax.    No acute osseous abnormality. Visualized upper abdomen is  unremarkable.  Soft tissues within normal limits      Impression    IMPRESSION:   1. No acute traumatic injury visualized.  2. No acute cardiopulmonary disease.     I have personally reviewed the examination and initial interpretation  and I agree with the findings.    SANDY BRYANT MD         SYSTEM ID:  [id number]   Thoracic spine XR, 3 views     Status: None    Narrative    EXAM: XR THORACIC SPINE 3 VIEWS  LOCATION: [facility]  DATE: 3/23/2025    INDICATION: fall, r o traumatic injury  COMPARISON: 01/16/2025      Impression    IMPRESSION: The vertebral body heights of the thoracic spine appear preserved on the anterior projection but are not diagnostically observed on the lateral projection. Lung volumes are low. Soft tissues of the upper abdomen are unremarkable.   Lumbar spine XR, 2-3 views     Status: None    Narrative    EXAM: XR LUMBAR SPINE 2/3 VIEWS  LOCATION: [facility]  DATE: 3/23/2025    INDICATION: fall, r o traumatic injury  COMPARISON: 01/16/2025      Impression    IMPRESSION: Normal vertebral heights and alignment. No severe disc height loss or facet arthropathy. Atherosclerotic calcifications of the abdominal aorta. Bilateral hip arthroplasty.   CT Facial Bones without Contrast     Status: None    Narrative    EXAM: CT FACIAL BONES WITHOUT CONTRAST  3/23/2025 10:45 AM     HISTORY: fall, r/o traumatic injury       COMPARISON: Same day head CT. 1/17/2025      TECHNIQUE: Using thin collimation multidetector helical acquisition  technique, axial,  sagittal, and coronal thin section CT images were  reconstructed through the facial bones. Images were reviewed in bone  and soft tissue windows.    FINDINGS:   Left superior periorbital hematoma with soft tissue swelling at its  periphery. Additionally there is mild left buccal region soft tissue  swelling. No underlying fracture is suspected. No post septal  abnormality. Intact bony alignment. Intact cribriform plate. Bilateral  pseudophakia. Otherwise normal orbital structures.    Moderate polypoid left maxillary sinus mucosal thickening. Minimal  right maxillary sinus mucosal thickening. Mild ethmoid mucosal  thickening. Right sphenoid mucosal thickening and frothy debris.  Mastoid air cells clear. Debris in the right external auditory canal,  presumed cerumen.    Extensive dental caries. Small periapical lucencies involving several  mandibular and maxillary teeth.     Cerebral and cerebellar atrophy.      Impression    IMPRESSION:   1. Left periorbital hematoma.   2. No acute facial bone fracture.    I have personally reviewed the examination and initial interpretation  and I agree with the findings.    GREG QUEVEDO MD         SYSTEM ID:  [id number]   XR Hand Left G/E 3 Views     Status: None    Narrative    EXAM: XR FOREARM LEFT 2 VIEWS, XR ELBOW LEFT 2 VIEWS, XR WRIST LEFT G/E 3 VIEWS, XR HAND LEFT G/E 3 VIEWS  LOCATION: [facility]  DATE: 3/23/2025    INDICATION: fall, r o traumatic injury  COMPARISON: None.      Impression    IMPRESSION: Acute, comminuted and impacted fracture of the left distal radius with intra-articular extension to the radiocarpal joint. Dorsal tilt of the distal radial articular surface. Tiny, minimally displaced ulnar styloid tip fracture. Soft tissue   swelling about the wrist.    No additional fracture of the left elbow, forearm, or hand. No elbow joint effusion. Demineralization. Moderate degenerative arthritis first CMC and STT joints.    XR Wrist Left G/E 3 Views     Status: None    Narrative    EXAM: XR FOREARM LEFT 2 VIEWS, XR ELBOW LEFT 2 VIEWS, XR WRIST LEFT G/E 3 VIEWS, XR HAND LEFT G/E 3 VIEWS  LOCATION: [facility]  DATE: 3/23/2025    INDICATION: fall, r o traumatic injury  COMPARISON: None.      Impression    IMPRESSION: Acute, comminuted and impacted fracture of the left distal radius with intra-articular extension to the radiocarpal joint. Dorsal tilt of the distal radial articular surface. Tiny, minimally displaced ulnar styloid tip fracture. Soft tissue   swelling about the wrist.    No additional fracture of the left elbow, forearm, or hand. No elbow joint effusion. Demineralization. Moderate degenerative arthritis first CMC and STT joints.   Radius/Ulna XR,  PA &LAT, left     Status: None    Narrative    EXAM: XR FOREARM LEFT 2 VIEWS, XR ELBOW LEFT 2 VIEWS, XR WRIST LEFT G/E 3 VIEWS, XR HAND LEFT G/E 3 VIEWS  LOCATION: [facility]  DATE: 3/23/2025    INDICATION: fall, r o traumatic injury  COMPARISON: None.      Impression    IMPRESSION: Acute, comminuted and impacted fracture of the left distal radius with intra-articular extension to the radiocarpal joint. Dorsal tilt of the distal radial articular surface. Tiny, minimally displaced ulnar styloid tip fracture. Soft tissue   swelling about the wrist.    No additional fracture of the left elbow, forearm, or hand. No elbow joint effusion. Demineralization. Moderate degenerative arthritis first CMC and STT joints.   XR Elbow Left 2 Views     Status: None    Narrative    EXAM: XR FOREARM LEFT 2 VIEWS, XR ELBOW LEFT 2 VIEWS, XR WRIST LEFT G/E 3 VIEWS, XR HAND LEFT G/E 3 VIEWS  LOCATION: [facility]  DATE: 3/23/2025    INDICATION: fall, r o traumatic injury  COMPARISON: None.      Impression    IMPRESSION: Acute, comminuted and impacted fracture of the  left distal radius with intra-articular extension to the radiocarpal joint. Dorsal tilt of the distal radial articular surface. Tiny, minimally displaced ulnar styloid tip fracture. Soft tissue   swelling about the wrist.    No additional fracture of the left elbow, forearm, or hand. No elbow joint effusion. Demineralization. Moderate degenerative arthritis first CMC and STT joints.   Comprehensive metabolic panel     Status: Abnormal   Result Value Ref Range    Sodium 140 135 - 145 mmol/L    Potassium 4.9 3.4 - 5.3 mmol/L    Carbon Dioxide (CO2) 20 (L) 22 - 29 mmol/L    Anion Gap 14 7 - 15 mmol/L    Urea Nitrogen 45.0 (H) 8.0 - 23.0 mg/dL    Creatinine 1.54 (H) 0.51 - 0.95 mg/dL    GFR Estimate 37 (L) >60 mL/min/1.73m2    Calcium 9.3 8.8 - 10.4 mg/dL    Chloride 106 98 - 107 mmol/L    Glucose 345 (H) 70 - 99 mg/dL    Alkaline Phosphatase 144 40 - 150 U/L    AST 40 0 - 45 U/L    ALT 28 0 - 50 U/L    Protein Total 6.4 6.4 - 8.3 g/dL    Albumin 3.7 3.5 - 5.2 g/dL    Bilirubin Total 0.2 <=1.2 mg/dL   UA with Microscopic reflex to Culture     Status: Abnormal    Specimen: Urine, Catheter   Result Value Ref Range    Color Urine Light Yellow Colorless, Straw, Light Yellow, Yellow    Appearance Urine Slightly Cloudy (A) Clear    Glucose Urine >=1000 (A) Negative mg/dL    Bilirubin Urine Negative Negative    Ketones Urine Negative Negative mg/dL    Specific Gravity Urine 1.016 1.003 - 1.035    Blood Urine Negative Negative    pH Urine 5.5 5.0 - 7.0    Protein Albumin Urine 20 (A) Negative mg/dL    Urobilinogen Urine Normal Normal, 2.0 mg/dL    Nitrite Urine Positive (A) Negative    Leukocyte Esterase Urine Negative Negative    Bacteria Urine Few (A) None Seen /HPF    RBC Urine <1 <=2 /HPF    WBC Urine 3 <=5 /HPF    Squamous Epithelials Urine 6 (H) <=1 /HPF    Narrative    Urine Culture ordered based on laboratory criteria   Troponin T, High Sensitivity     Status: Abnormal   Result Value Ref Range    Troponin T, High  Sensitivity 49 (H) <=14 ng/L   CBC with platelets and differential     Status: Abnormal   Result Value Ref Range    WBC Count 5.5 4.0 - 11.0 10e3/uL    RBC Count 3.78 (L) 3.80 - 5.20 10e6/uL    Hemoglobin 10.5 (L) 11.7 - 15.7 g/dL    Hematocrit 33.6 (L) 35.0 - 47.0 %    MCV 89 78 - 100 fL    MCH 27.8 26.5 - 33.0 pg    MCHC 31.3 (L) 31.5 - 36.5 g/dL    RDW 14.6 10.0 - 15.0 %    Platelet Count 185 150 - 450 10e3/uL    % Neutrophils 68 %    % Lymphocytes 24 %    % Monocytes 5 %    % Eosinophils 2 %    % Basophils 0 %    % Immature Granulocytes 1 %    NRBCs per 100 WBC 0 <1 /100    Absolute Neutrophils 3.8 1.6 - 8.3 10e3/uL    Absolute Lymphocytes 1.3 0.8 - 5.3 10e3/uL    Absolute Monocytes 0.3 0.0 - 1.3 10e3/uL    Absolute Eosinophils 0.1 0.0 - 0.7 10e3/uL    Absolute Basophils 0.0 0.0 - 0.2 10e3/uL    Absolute Immature Granulocytes 0.0 <=0.4 10e3/uL    Absolute NRBCs 0.0 10e3/uL   [facility]     Status: None (In process)    Narrative    The following orders were created for panel order [facility].  Procedure                               Abnormality         Status                     ---------                               -----------         ------                     Extra Blue Top Tube[[id number]]                             In process                 Extra Red Top Tube[[id number]]                              In process                   Please view results for these tests on the individual orders.   Troponin T, High Sensitivity     Status: Abnormal   Result Value Ref Range    Troponin T, High Sensitivity 46 (H) <=14 ng/L   EKG 12-lead, tracing only     Status: None   Result Value Ref Range    Systolic Blood Pressure  mmHg    Diastolic Blood Pressure  mmHg    Ventricular Rate 79 BPM    Atrial Rate 79 BPM    WV Interval 144 ms    QRS Duration 88 ms     ms    QTc 447 ms    P Axis 32 degrees    R AXIS -6 degrees    T Axis 25 degrees    Interpretation ECG       Sinus rhythm  Inferior infarct , age  undetermined  Possible Anterolateral infarct , age undetermined  Abnormal ECG  Unconfirmed report - interpretation of this ECG is computer generated - see medical record for final interpretation    Confirmed by - EMERGENCY ROOM, PHYSICIAN ([id number]),  CARSON PAREDES ([id number]) on 3/23/2025 10:45:27 AM     CBC with platelets differential     Status: Abnormal    Narrative    The following orders were created for panel order CBC with platelets differential.  Procedure                               Abnormality         Status                     ---------                               -----------         ------                     CBC with platelets and ...[[id number]]  Abnormal            Final result                 Please view results for these tests on the individual orders.     Medications   acetaminophen (TYLENOL) tablet 975 mg (has no administration in time range)   aspirin (ASA) chewable tablet 81 mg ( Oral Automatically Held 3/26/25 0800)   atorvastatin (LIPITOR) tablet 40 mg (has no administration in time range)   cyanocobalamin (VITAMIN B-12) tablet 1,000 mcg (has no administration in time range)   DULoxetine (CYMBALTA) DR capsule 20 mg (has no administration in time range)   gabapentin (NEURONTIN) capsule 100 mg (has no administration in time range)   levETIRAcetam (KEPPRA) tablet 500 mg (has no administration in time range)   loratadine (CLARITIN) tablet 10 mg (has no administration in time range)   methocarbamol (ROBAXIN) tablet 500 mg (has no administration in time range)   levothyroxine (SYNTHROID/LEVOTHROID) tablet 75 mcg (has no administration in time range)   carvedilol (COREG) tablet 12.5 mg (has no administration in time range)   levETIRAcetam (KEPPRA) tablet 500 mg (has no administration in time range)   carvedilol (COREG) tablet 12.5 mg (12.5 mg Oral $Given 3/23/25 1237)   cefTRIAXone (ROCEPHIN) 1 g vial to attach to  mL bag for ADULTS or NS 50 mL bag for PEDS (0 g Intravenous Stopped 3/23/25  1534)     Labs Ordered and Resulted from Time of ED Arrival to Time of ED Departure   COMPREHENSIVE METABOLIC PANEL - Abnormal       Result Value    Sodium 140      Potassium 4.9      Carbon Dioxide (CO2) 20 (*)     Anion Gap 14      Urea Nitrogen 45.0 (*)     Creatinine 1.54 (*)     GFR Estimate 37 (*)     Calcium 9.3      Chloride 106      Glucose 345 (*)     Alkaline Phosphatase 144      AST 40      ALT 28      Protein Total 6.4      Albumin 3.7      Bilirubin Total 0.2     ROUTINE UA WITH MICROSCOPIC REFLEX TO CULTURE - Abnormal    Color Urine Light Yellow      Appearance Urine Slightly Cloudy (*)     Glucose Urine >=1000 (*)     Bilirubin Urine Negative      Ketones Urine Negative      Specific Gravity Urine 1.016      Blood Urine Negative      pH Urine 5.5      Protein Albumin Urine 20 (*)     Urobilinogen Urine Normal      Nitrite Urine Positive (*)     Leukocyte Esterase Urine Negative      Bacteria Urine Few (*)     RBC Urine <1      WBC Urine 3      Squamous Epithelials Urine 6 (*)    TROPONIN T, HIGH SENSITIVITY - Abnormal    Troponin T, High Sensitivity 49 (*)    CBC WITH PLATELETS AND DIFFERENTIAL - Abnormal    WBC Count 5.5      RBC Count 3.78 (*)     Hemoglobin 10.5 (*)     Hematocrit 33.6 (*)     MCV 89      MCH 27.8      MCHC 31.3 (*)     RDW 14.6      Platelet Count 185      % Neutrophils 68      % Lymphocytes 24      % Monocytes 5      % Eosinophils 2      % Basophils 0      % Immature Granulocytes 1      NRBCs per 100 WBC 0      Absolute Neutrophils 3.8      Absolute Lymphocytes 1.3      Absolute Monocytes 0.3      Absolute Eosinophils 0.1      Absolute Basophils 0.0      Absolute Immature Granulocytes 0.0      Absolute NRBCs 0.0     TROPONIN T, HIGH SENSITIVITY - Abnormal    Troponin T, High Sensitivity 46 (*)    URINE CULTURE   RESPIRATORY PANEL PCR   BLOOD CULTURE   BLOOD CULTURE     XR Elbow Left 2 Views   Final Result   IMPRESSION: Acute, comminuted and impacted fracture of the left distal  radius with intra-articular extension to the radiocarpal joint. Dorsal tilt of the distal radial articular surface. Tiny, minimally displaced ulnar styloid tip fracture. Soft tissue    swelling about the wrist.      No additional fracture of the left elbow, forearm, or hand. No elbow joint effusion. Demineralization. Moderate degenerative arthritis first CMC and STT joints.      Radius/Ulna XR,  PA &LAT, left   Final Result   IMPRESSION: Acute, comminuted and impacted fracture of the left distal radius with intra-articular extension to the radiocarpal joint. Dorsal tilt of the distal radial articular surface. Tiny, minimally displaced ulnar styloid tip fracture. Soft tissue    swelling about the wrist.      No additional fracture of the left elbow, forearm, or hand. No elbow joint effusion. Demineralization. Moderate degenerative arthritis first CMC and STT joints.      XR Wrist Left G/E 3 Views   Final Result   IMPRESSION: Acute, comminuted and impacted fracture of the left distal radius with intra-articular extension to the radiocarpal joint. Dorsal tilt of the distal radial articular surface. Tiny, minimally displaced ulnar styloid tip fracture. Soft tissue    swelling about the wrist.      No additional fracture of the left elbow, forearm, or hand. No elbow joint effusion. Demineralization. Moderate degenerative arthritis first CMC and STT joints.      XR Hand Left G/E 3 Views   Final Result   IMPRESSION: Acute, comminuted and impacted fracture of the left distal radius with intra-articular extension to the radiocarpal joint. Dorsal tilt of the distal radial articular surface. Tiny, minimally displaced ulnar styloid tip fracture. Soft tissue    swelling about the wrist.      No additional fracture of the left elbow, forearm, or hand. No elbow joint effusion. Demineralization. Moderate degenerative arthritis first CMC and STT joints.      Lumbar spine XR, 2-3 views   Final Result   IMPRESSION: Normal vertebral  heights and alignment. No severe disc height loss or facet arthropathy. Atherosclerotic calcifications of the abdominal aorta. Bilateral hip arthroplasty.      Thoracic spine XR, 3 views   Final Result   IMPRESSION: The vertebral body heights of the thoracic spine appear preserved on the anterior projection but are not diagnostically observed on the lateral projection. Lung volumes are low. Soft tissues of the upper abdomen are unremarkable.      XR Chest 2 Views   Final Result   IMPRESSION:    1. No acute traumatic injury visualized.   2. No acute cardiopulmonary disease.       I have personally reviewed the examination and initial interpretation   and I agree with the findings.      SANDY BRYANT MD            SYSTEM ID:  [id number]      CT Facial Bones without Contrast   Final Result   IMPRESSION:    1. Left periorbital hematoma.    2. No acute facial bone fracture.      I have personally reviewed the examination and initial interpretation   and I agree with the findings.      GREG QUEVEDO MD            SYSTEM ID:  [id number]      CT Head w/o Contrast   Final Result   IMPRESSION:    1. No acute intracranial pathology.    2. Left periorbital soft tissue contusion without underlying fracture.   3. Moderate cerebral atrophy, sequelae of chronic small vessel   ischemic disease, and old lacunar infarcts.      I have personally reviewed the examination and initial interpretation   and I agree with the findings.      GREG QUEVEDO MD            SYSTEM ID:  [id number]      CT Cervical Spine w/o Contrast   Final Result   IMPRESSION:   1. No acute fracture or traumatic subluxation.   2. Multilevel cervical spondylosis, most pronounced at C4-5 and C5-6.   No high-grade spinal canal or neuroforaminal stenosis.      I have personally reviewed the examination and initial interpretation   and I agree with the findings.      GREG QUEVEDO MD            SYSTEM ID:  [id number]      XR Pelvis w 1 View Each Hip    (Results Pending)           Critical care was not performed.     Medical Decision Making  The patient's presentation was of high complexity (a chronic illness severe exacerbation, progression, or side effect of treatment).    The patient's evaluation involved:  ordering and/or review of 3+ test(s) in this encounter (see separate area of note for details)    The patient's management necessitated moderate risk (prescription drug management including medications given in the ED) and high risk (a decision regarding hospitalization).    Assessment & Plan    63 yo female here with headache, neck, back and L wrist pain after a mechanical fall. She states the tripped on her own pants while getting into the elevator at her apartment building. She laid on the ground and yelled for help. She admits to hitting her head but does not think she blacked out. She denies any nausea or vomiting. Labs and imaging studies ordered as well as acetaminophen.    Labs show troponin 49 to 46 at two hours, CO2 20, BUN 45, Cr 1.54, urine with >1000 glucose, 20 protein, positive nitrites, and few bacteria. CT head, face and neck are significant for periorbital hematoma but no fractures and otherwise normal head CT. Wrist xrays show: an acute, comminuted and impacted fracture of the left distal radius with intra-articular extension to the radiocarpal joint. Dorsal tilt of the distal radial articular surface. Tiny, minimally displaced ulnar styloid tip fracture. Soft tissue swelling about the wrist. She was placed in a prefab splint which is all she would agree to have done. The rest of the xrays are negative for acute process. She will be admitted to medicine for further workup and management.    I have reviewed the nursing notes. I have reviewed the findings, diagnosis, plan and need for follow up with the patient.    New Prescriptions    No medications on file       Final diagnoses:   Elevated troponin   Wrist fracture, left, closed, initial encounter   Acute  cystitis without hematuria       I, Cam Lozoya, am serving as a trained medical scribe to document services personally performed by Cedrick Combs MD based on the provider's statements to me on March 23, 2025.  This document has been checked and approved by the attending provider.    I, Cedrick Combs MD, was physically present and have reviewed and verified the accuracy of this note documented by Cam Lozoya, medical scribe.      Cedrick Combs MD    [facility] EMERGENCY DEPARTMENT  3/23/2025     Cedrick Combs MD  03/23/25 5220

## 2025-03-23 NOTE — ED TRIAGE NOTES
BIB EMS, Patient had a fall at home. Left side facial, left wrist pain. History of fall, seizure, dementia. , /100.       Triage Assessment (Adult)       Row Name 03/23/25 0935          Triage Assessment    Airway WDL WDL        Respiratory WDL    Respiratory WDL WDL        Skin Circulation/Temperature WDL    Skin Circulation/Temperature WDL X        Cardiac WDL    Cardiac WDL WDL     Cardiac Rhythm NSR        Peripheral/Neurovascular WDL    Peripheral Neurovascular WDL X        Cognitive/Neuro/Behavioral WDL    Cognitive/Neuro/Behavioral WDL X     Level of Consciousness intermittent confusion     Arousal Level opens eyes spontaneously     Orientation disoriented to;place     Speech clear     Mood/Behavior labile        Pupils (CN II)    Pupil Size Left 3 mm     Pupil Size Right 3 mm        [place] Coma Scale    Best Eye Response 4-->(E4) spontaneous     Best Motor Response 6-->(M6) obeys commands     Best Verbal Response 5-->(V5) oriented     [place] Coma Scale Score 15     Assessment Qualifiers patient not sedated/intubated

## 2025-03-24 ENCOUNTER — TELEPHONE (OUTPATIENT)
Dept: ORTHOPEDICS | Facility: CLINIC | Age: 65
End: 2025-03-24
Payer: COMMERCIAL

## 2025-03-24 ENCOUNTER — TELEPHONE (OUTPATIENT)
Dept: FAMILY MEDICINE | Facility: CLINIC | Age: 65
End: 2025-03-24

## 2025-03-24 ENCOUNTER — TELEPHONE (OUTPATIENT)
Dept: ORTHOPEDICS | Facility: CLINIC | Age: 65
End: 2025-03-24

## 2025-03-24 LAB
ANION GAP SERPL CALCULATED.3IONS-SCNC: 12 MMOL/L (ref 7–15)
BACTERIA UR CULT: ABNORMAL
BUN SERPL-MCNC: 35.4 MG/DL (ref 8–23)
CALCIUM SERPL-MCNC: 9.1 MG/DL (ref 8.8–10.4)
CHLORIDE SERPL-SCNC: 103 MMOL/L (ref 98–107)
CREAT SERPL-MCNC: 1.46 MG/DL (ref 0.51–0.95)
EGFRCR SERPLBLD CKD-EPI 2021: 39 ML/MIN/1.73M2
ERYTHROCYTE [DISTWIDTH] IN BLOOD BY AUTOMATED COUNT: 14.5 % (ref 10–15)
FOLATE SERPL-MCNC: 22.8 NG/ML (ref 4.6–34.8)
GLUCOSE BLDC GLUCOMTR-MCNC: 155 MG/DL (ref 70–99)
GLUCOSE BLDC GLUCOMTR-MCNC: 202 MG/DL (ref 70–99)
GLUCOSE BLDC GLUCOMTR-MCNC: 284 MG/DL (ref 70–99)
GLUCOSE BLDC GLUCOMTR-MCNC: 286 MG/DL (ref 70–99)
GLUCOSE BLDC GLUCOMTR-MCNC: 286 MG/DL (ref 70–99)
GLUCOSE SERPL-MCNC: 315 MG/DL (ref 70–99)
HCO3 SERPL-SCNC: 23 MMOL/L (ref 22–29)
HCT VFR BLD AUTO: 33.3 % (ref 35–47)
HGB BLD-MCNC: 10.5 G/DL (ref 11.7–15.7)
MCH RBC QN AUTO: 28.1 PG (ref 26.5–33)
MCHC RBC AUTO-ENTMCNC: 31.5 G/DL (ref 31.5–36.5)
MCV RBC AUTO: 89 FL (ref 78–100)
PLATELET # BLD AUTO: 191 10E3/UL (ref 150–450)
POTASSIUM SERPL-SCNC: 4.2 MMOL/L (ref 3.4–5.3)
RBC # BLD AUTO: 3.74 10E6/UL (ref 3.8–5.2)
SODIUM SERPL-SCNC: 138 MMOL/L (ref 135–145)
WBC # BLD AUTO: 6.7 10E3/UL (ref 4–11)

## 2025-03-24 PROCEDURE — 99232 SBSQ HOSP IP/OBS MODERATE 35: CPT | Mod: GC | Performed by: PEDIATRICS

## 2025-03-24 PROCEDURE — L3908 WHO COCK-UP NONMOLDE PRE OTS: HCPCS

## 2025-03-24 PROCEDURE — 999N000147 HC STATISTIC PT IP EVAL DEFER

## 2025-03-24 PROCEDURE — 258N000003 HC RX IP 258 OP 636

## 2025-03-24 PROCEDURE — 250N000011 HC RX IP 250 OP 636

## 2025-03-24 PROCEDURE — 82962 GLUCOSE BLOOD TEST: CPT

## 2025-03-24 PROCEDURE — 82746 ASSAY OF FOLIC ACID SERUM: CPT

## 2025-03-24 PROCEDURE — 85027 COMPLETE CBC AUTOMATED: CPT

## 2025-03-24 PROCEDURE — 36415 COLL VENOUS BLD VENIPUNCTURE: CPT

## 2025-03-24 PROCEDURE — 250N000013 HC RX MED GY IP 250 OP 250 PS 637

## 2025-03-24 PROCEDURE — 80048 BASIC METABOLIC PNL TOTAL CA: CPT

## 2025-03-24 PROCEDURE — 120N000002 HC R&B MED SURG/OB UMMC

## 2025-03-24 PROCEDURE — 99253 IP/OBS CNSLTJ NEW/EST LOW 45: CPT | Performed by: PHYSICIAN ASSISTANT

## 2025-03-24 RX ORDER — LEVOTHYROXINE SODIUM 50 UG/1
100 TABLET ORAL
Status: DISCONTINUED | OUTPATIENT
Start: 2025-03-25 | End: 2025-03-30 | Stop reason: HOSPADM

## 2025-03-24 RX ADMIN — LEVOTHYROXINE SODIUM 75 MCG: 0.03 TABLET ORAL at 09:04

## 2025-03-24 RX ADMIN — ATORVASTATIN CALCIUM 40 MG: 20 TABLET, FILM COATED ORAL at 09:05

## 2025-03-24 RX ADMIN — CYANOCOBALAMIN TAB 500 MCG 1000 MCG: 500 TAB at 09:04

## 2025-03-24 RX ADMIN — INSULIN GLARGINE 10 UNITS: 100 INJECTION, SOLUTION SUBCUTANEOUS at 21:52

## 2025-03-24 RX ADMIN — INSULIN ASPART 2 UNITS: 100 INJECTION, SOLUTION INTRAVENOUS; SUBCUTANEOUS at 13:43

## 2025-03-24 RX ADMIN — LEVETIRACETAM 500 MG: 500 TABLET, FILM COATED ORAL at 09:05

## 2025-03-24 RX ADMIN — GABAPENTIN 100 MG: 100 CAPSULE ORAL at 21:52

## 2025-03-24 RX ADMIN — SODIUM CHLORIDE 1000 ML: 0.9 INJECTION, SOLUTION INTRAVENOUS at 17:22

## 2025-03-24 RX ADMIN — INSULIN ASPART 3 UNITS: 100 INJECTION, SOLUTION INTRAVENOUS; SUBCUTANEOUS at 09:42

## 2025-03-24 RX ADMIN — INSULIN ASPART 1 UNITS: 100 INJECTION, SOLUTION INTRAVENOUS; SUBCUTANEOUS at 19:47

## 2025-03-24 RX ADMIN — CEFTRIAXONE SODIUM 1 G: 1 INJECTION, POWDER, FOR SOLUTION INTRAMUSCULAR; INTRAVENOUS at 13:25

## 2025-03-24 RX ADMIN — METHOCARBAMOL 500 MG: 500 TABLET ORAL at 09:04

## 2025-03-24 RX ADMIN — LEVETIRACETAM 500 MG: 500 TABLET, FILM COATED ORAL at 19:48

## 2025-03-24 RX ADMIN — DULOXETINE HYDROCHLORIDE 20 MG: 20 CAPSULE, DELAYED RELEASE ORAL at 09:42

## 2025-03-24 RX ADMIN — LORATADINE 10 MG: 10 TABLET ORAL at 09:05

## 2025-03-24 ASSESSMENT — ACTIVITIES OF DAILY LIVING (ADL)
ADLS_ACUITY_SCORE: 77
ADLS_ACUITY_SCORE: 77
ADLS_ACUITY_SCORE: 64
ADLS_ACUITY_SCORE: 64
ADLS_ACUITY_SCORE: 65
ADLS_ACUITY_SCORE: 64
ADLS_ACUITY_SCORE: 64
ADLS_ACUITY_SCORE: 65
ADLS_ACUITY_SCORE: 77
ADLS_ACUITY_SCORE: 64
ADLS_ACUITY_SCORE: 77
ADLS_ACUITY_SCORE: 65
ADLS_ACUITY_SCORE: 77
ADLS_ACUITY_SCORE: 65
ADLS_ACUITY_SCORE: 64
ADLS_ACUITY_SCORE: 77
ADLS_ACUITY_SCORE: 64
ADLS_ACUITY_SCORE: 65
ADLS_ACUITY_SCORE: 77

## 2025-03-24 NOTE — CONSULTS
[facility]  Orthopaedic Surgery  by Chauncey Tran PA-C    [facility]  ORTHOPAEDIC SURGERY CONSULT     Isabell Matthews MRN# [id number]    YOB: 1960       DATE OF CONSULT: 3/24/2025 12:00 PM    REQUESTING PROVIDER: Matt Joshua DO, MD - [facility] Staff.    DATE OF INJURY: 3/23/25    HISTORY OF PRESENT ILLNESS:   The orthopaedic surgery service was consulted by Matt Murillo DO for evaluation and treatment recommendations of her closed comminuted intra-articular impacted left distal radius fracture.    Isabell Matthews is a 64 year old female with a notable history of vascular dementia, frequent falls, CVA, HTN, HLD, CKD IIIb, T1DM c/b neuropathy, ischemic and hemorrhagic strokes and seizures who present 3/23/25 for evaluation following a fall.  Imaging was performed and x-ray showed an acute, comminuted and impacted fracture of the left distal radius with intra-articular extension into the radiocarpal joint.  Normal head CT.  Plan is admit the patient for further observation as she has elevated troponin.    Denies numbness, tingling, or weakness to the affected extremities.  Denies fevers, chills, nausea, vomiting, diarrhea, constipation, chest pain, shortness of breath.    PAST MEDICAL HISTORY:   Past Medical History:   Diagnosis Date    Chronic pain     Coronary atherosclerosis 6/14/2016    Essential hypertension     Ex-cigarette smoker     quit 7/2018 over 35 years    Ex-cigarette smoker     Hyperlipidemia     Hypothyroid     Seizures (H)     Stroke (H)     Vascular dementia (H)      [Patient denies any personal history of bleeding disorders, clotting disorders, or adverse reactions to anesthesia].    PAST SURGICAL HISTORY:    Past Surgical History:   Procedure Laterality Date    athroplasty hip Bilateral     2003, 2006    OTHER SURGICAL HISTORY      athroplasty hip    PICC DOUBLE LUMEN PLACEMENT Right 06/11/2023    right basilic 5 fr dl power picc 39 cm    STENT          MEDICATIONS:   Prior to Admission medications    Medication Sig Last Dose Taking? Auth Provider Long Term End Date   acetaminophen (TYLENOL) 325 MG tablet Take 3 tablets (975 mg) by mouth every 8 hours. Past Week Yes Dasha Riggs APRN CNP No    amLODIPine (NORVASC) 5 MG tablet Take 1 tablet (5 mg) by mouth daily. More than a month Yes Ronnie Kellogg MD Yes    aspirin (ASA) 81 MG chewable tablet Take 1 tablet (81 mg) by mouth daily 3/22/2025 Yes Nighat Menard MD No    atorvastatin (LIPITOR) 40 MG tablet Take 1 tablet (40 mg) by mouth daily. 3/22/2025 Morning Yes Bony Castaneda MD Yes    carvedilol (COREG) 12.5 MG tablet Take 1 tablet (12.5 mg) by mouth 2 times daily (with meals). 3/22/2025 Evening Yes Bony Castaneda MD Yes    cyanocobalamin (VITAMIN B-12) 1000 MCG tablet Take 1 tablet (1,000 mcg) by mouth daily 3/22/2025 Morning Yes Nighat Menard MD     DULoxetine (CYMBALTA) 20 MG capsule TAKE 1 CAPSULE(20 MG) BY MOUTH DAILY 3/22/2025 Bedtime Yes Ronnie Kellogg MD Yes    gabapentin (NEURONTIN) 100 MG capsule Take 1 capsule (100 mg) by mouth at bedtime. 3/22/2025 Bedtime Yes Bony Castaneda MD Yes    insulin glargine (LANTUS PEN) 100 UNIT/ML pen Inject 19 Units subcutaneously at bedtime.  Patient taking differently: Inject 20 Units subcutaneously at bedtime. 3/22/2025 Bedtime Yes Oziel Wyatt MD Yes    levETIRAcetam (KEPPRA) 500 MG tablet Take 1 tablet (500 mg) by mouth 2 times daily. 3/22/2025 Evening Yes Bony Castaneda MD Yes    loratadine (CLARITIN) 10 MG tablet Take 1 tablet (10 mg) by mouth daily. 3/22/2025 Evening Yes Bony Castaneda MD     methocarbamol (ROBAXIN) 500 MG tablet Take 1 tablet (500 mg) by mouth daily as needed for muscle spasms. More than a month Yes Ronnie Kellogg MD     SYNTHROID 75 MCG tablet Take 1 tablet (75 mcg) by mouth daily. 3/23/2025 Morning Yes Bony Castaneda MD Yes    blood glucose (NO BRAND SPECIFIED)  test strip Use to test blood sugar 4 times daily or as directed.   Ronnie Kellogg MD     blood glucose (NO BRAND SPECIFIED) test strip Use to test blood sugar 8 times daily or as directed.   Ronnie Kellogg MD     blood glucose (TRUE METRIX BLOOD GLUCOSE TEST) test strip USE TO TEST BLOOD SUGAR 4 TO 5 TIMES DAILY OR AS DIRECTED   Nighat Menard MD     blood glucose monitoring (NO BRAND SPECIFIED) meter device kit Use to test blood sugar 4 times daily.  Please provide glucose meter that is covered by insurance.   Bony Castaneda MD     blood glucose monitoring (ONE TOUCH ULTRA MINI) meter device kit Use to test blood sugar 4 times daily or as directed.   Ronnie Kellogg MD     chlorhexidine (PERIDEX) 0.12 % solution Take 30 mLs by mouth daily. Swish and spit 30 mL once daily for 14 days.Swish and spit 30 mL once daily for 14 days.   Ronnie Kellogg MD     Continuous Glucose Sensor (FREESTYLE MARIA FERNANDA 2 SENSOR) MISC Change every 14 days.   Ronnie Kellogg MD     Glucagon (GVOKE HYPOPEN) 1 MG/0.2ML pen Inject the contents of 1 device under the skin into lower abdomen, outer thigh, or outer upper arm as needed for hypoglycemia. If no response after 15 minutes, additional 1 mg dose from a new device may be injected while waiting for emergency assistance.   Bony Castaneda MD Yes    insulin aspart (NOVOLOG FLEXPEN) 100 UNIT/ML pen Inject 2 units with meals plus correction scale additional three times daily before meals  as follows  Pre-Meal  - 200 give additional 1 unit.  For Pre-Meal  -250 give 2 additional units.  For Pre-Meal -300 give 3 additional units.  For Pre-Meal -350 give 4 additional units.  For Pre-Meal -400 give 5 additional units Max 25 units daily   Bony Castaneda MD Yes    insulin aspart (NOVOLOG PENFILL) 100 UNIT/ML cartridge Inject 2-4 Units Subcutaneous 4 times daily (with meals and nightly) diabetes   Jess Nair MD Yes    PENTIPS  32G X 4 MM miscellaneous Inject 1 each subcutaneously daily. Use 4 pen needles daily or as directed.   Reported, Patient     zinc oxide (DESITIN) 20 % external ointment Apply topically as needed for dry skin or irritation   Nighat Menard MD         ALLERGIES:   Seasonal allergies    SOCIAL HISTORY:   Social History     Socioeconomic History    Marital status:      Spouse name: Not on file    Number of children: Not on file    Years of education: Not on file    Highest education level: Not on file   Occupational History    Not on file   Tobacco Use    Smoking status: Former     Current packs/day: 0.00     Average packs/day: 0.5 packs/day for 35.0 years (17.5 ttl pk-yrs)     Types: Cigarettes     Start date: 1983     Quit date: 2018     Years since quittin.7    Smokeless tobacco: Never   Substance and Sexual Activity    Alcohol use: Not Currently    Drug use: Not Currently    Sexual activity: Not Currently   Other Topics Concern    Parent/sibling w/ CABG, MI or angioplasty before 65F 55M? Not Asked   Social History Narrative    ** Merged History Encounter **         Recently moved to [facility] with Daughter Charli who is her pca     Social Drivers of Health     Financial Resource Strain: Unknown (2024)    Financial Resource Strain     Within the past 12 months, have you or your family members you live with been unable to get utilities (heat, electricity) when it was really needed?: Patient unable to answer   Food Insecurity: Low Risk  (2024)    Food Insecurity     Within the past 12 months, did you worry that your food would run out before you got money to buy more?: No     Within the past 12 months, did the food you bought just not last and you didn t have money to get more?: No   Transportation Needs: Unknown (2024)    Transportation Needs     Within the past 12 months, has lack of transportation kept you from medical appointments, getting your medicines, non-medical  meetings or appointments, work, or from getting things that you need?: Patient unable to answer   Physical Activity: Not on file   Stress: Not on file   Social Connections: Not on file   Interpersonal Safety: Low Risk  (8/25/2024)    Interpersonal Safety     Do you feel physically and emotionally safe where you currently live?: Yes     Within the past 12 months, have you been hit, slapped, kicked or otherwise physically hurt by someone?: No     Within the past 12 months, have you been humiliated or emotionally abused in other ways by your partner or ex-partner?: No   Housing Stability: Unknown (8/25/2024)    Housing Stability     Do you have housing? : Patient unable to answer     Are you worried about losing your housing?: Patient unable to answer     Living situation: Patient lives with her daughter and requires assistance with activities of daily living.      FAMILY HISTORY:  Family History   Problem Relation Age of Onset    Acute Myocardial Infarction Father     Heart Disease Maternal Grandmother     Dementia Maternal Grandmother     Myocardial Infarction Father     Dementia Paternal Grandmother     Heart Disease Paternal Grandmother        Patient denies known family history of bleeding, clotting, or anesthesia related complications.     REVIEW OF SYSTEMS:   Positive for periorbital ecchymosis and left distal radius fracture with associated swelling and pain.. Otherwise a 10-point reviews of systems was negative except as noted above in the HPI.     PHYSICAL EXAM:   Vitals:    03/23/25 2138 03/23/25 2140 03/23/25 2143 03/24/25 0647   BP: (!) 162/105 104/57 (!) 162/76 (!) 166/107   BP Location: Right arm Right arm Right arm Right arm   Patient Position: Supine  Standing    Cuff Size: Adult Regular Adult Regular Adult Regular Adult Regular   Pulse: 79 83 113 101   Resp:    16   Temp:    99.1  F (37.3  C)   TempSrc:    Oral   SpO2:  98%  94%     General: Patient awake but not well oriented to time or place  Lungs:  Breathing comfortably and nonlabored, no wheezes or stridor noted.  Heart/Cardiovascular: Regular pulse, no peripheral cyanosis.  Back: Nontender to palpation throughout, no step-offs or deformities appreciated. Bilateral SI joints non-tender.   Pelvis: Stable to AP and Lateral compression, non-tender.    Left Upper Extremity: Diffuse swelling and pain to palpation along the left distal radius.  Motor intact distally with finger flexion/extension/intrinsics/EPL, OK sign. SILT ax/m/r/u nerve distributions. Radial pulse palpable, 2+.  Cap refill less than 3 seconds.      LABS:  Hemoglobin   Date Value Ref Range Status   03/24/2025 10.5 (L) 11.7 - 15.7 g/dL Final   03/23/2025 10.5 (L) 11.7 - 15.7 g/dL Final   06/27/2021 12.0 11.7 - 15.7 g/dL Final   06/26/2021 11.8 11.7 - 15.7 g/dL Final     WBC   Date Value Ref Range Status   06/27/2021 5.3 4.0 - 11.0 10e9/L Final     WBC Count   Date Value Ref Range Status   03/24/2025 6.7 4.0 - 11.0 10e3/uL Final     Platelet Count   Date Value Ref Range Status   03/24/2025 191 150 - 450 10e3/uL Final   06/27/2021 230 150 - 450 10e9/L Final     INR   Date Value Ref Range Status   01/16/2025 0.96 0.85 - 1.15 Final   06/21/2021 0.91 0.86 - 1.14 Final     Creatinine   Date Value Ref Range Status   03/24/2025 1.46 (H) 0.51 - 0.95 mg/dL Final   06/27/2021 1.48 (H) 0.52 - 1.04 mg/dL Final     Glucose   Date Value Ref Range Status   03/24/2025 315 (H) 70 - 99 mg/dL Final   07/26/2022 418 (H) 70 - 99 mg/dL Final   06/27/2021 49 (LL) 70 - 99 mg/dL Final     Comment:     Critical Value called to and read back by  BRIAN MATTHEW RN ON UU6A,06/27/2021,0912,MJ       GLUCOSE BY METER POCT   Date Value Ref Range Status   03/24/2025 286 (H) 70 - 99 mg/dL Final     CRP Inflammation   Date Value Ref Range Status   05/29/2022 11.0 (H) 0.0 - 8.0 mg/L Final     Erythrocyte Sedimentation Rate   Date Value Ref Range Status   12/10/2022 18 0 - 30 mm/hr Final         IMAGING:    3/23/2025 x-ray left wrist  and forearm: IMPRESSION: Acute, comminuted and impacted fracture of the left distal radius with intra-articular extension to the radiocarpal joint. Dorsal tilt of the distal radial articular surface. Tiny, minimally displaced ulnar styloid tip fracture. Soft tissue   swelling about the wrist.      ASSESSMENT AND PLAN:    Isabell Matthews is a 64 year old female who presents with a closed comminuted, intra-articular, impacted fracture of the left distal radius with dorsal tilt with associated minimally displaced ulnar styloid tip fracture following a fall.    Activity: No using the left wrist and hand  Weight bearing status: No using left wrist and hand  Pain management: Transition from IV to PO as tolerated.    Antibiotics/Tetanus: none  Diet: Per internal medicine  Anticoagulation/DVT prophylaxis: none  Bracing/Splinting: Left Wrist brace on at all times.  Okay to remove daily for skin checks.  Elevation: Elevate left upper extremity on pillows to keep above the level of the heart as much as possible.     PLAN: Given the patient was not oriented with time or place I reach out to the patient's daughter Vishal with my recommendations.  I explained that her mother has a fracture that would that would be reasonable to fix with surgical intervention but given her mother's level of low functioning, low compliance and dependence I would not recommend surgery at this time.  The risks and benefits of operative versus nonoperative treatment were reviewed.  If we were to proceed with operative management this will require a level of follow-up and patient compliance that I do not feel her mother has.  If we proceed to proceed with nonoperative treatment she will likely have limited range of motion given the dorsal angulation of the fracture.  She will likely be limited in flexion of the wrist.  Her daughter expressed concern that she will not be able to hold her walker,  or grasp.  I feel that in the current position she will  still be able to function with a walker and  for activities of daily living.  Patient's daughter was in agreement that she does not wish to proceed with surgical intervention at this time.  I recommend she continue to wear her brace at all times and not use the left hand or wrist.  She should follow-up with hand and 1 week for close observation.  I explained that if the fracture further displaces this may warrant surgical intervention in the future.  The patient's daughter expressed verbal understanding and agreed with the plan.    Assessment and Plan discussed with bedside nurse    Chauncey Tran PA-C  3/24/2025 12:00 PM  Physician Assistant   Oncology and Adult Reconstructive Surgery  Dept Orthopaedic Surgery, [facility]     Thank you for allowing me to participate in this patient's care. Please page me directly any questions/concerns.   Securely message with the Vocera Web Console (learn more here)  Text page via [employer]    If there is no response, if it is a weekend, or if it is during evening hours, please page the orthopaedic surgery resident on call via [employer] Paging/Directory

## 2025-03-24 NOTE — TELEPHONE ENCOUNTER
Isabell [facility]22 hours ago (1:22 PM)       Topic: Non-Medical Question.     Hello, we need to increase the amount of diapers that she gets every month with 20 (amount in a package) more.      Thanks!

## 2025-03-24 NOTE — PROGRESS NOTES
Pt is Aox 1-2( intermittent confusion and delayed responses), made attempts to exit bed. Bed alarm is on. Stable on room air. NPO since midnight. Tele: sinus tachy.Skin; major bruising and swelling in L eye and R. Posterior hip. 10p blood sugar=187, 2a bood sugar=  284. 1-2 assist. Urinary incontinence present. R PIV is SL

## 2025-03-24 NOTE — TELEPHONE ENCOUNTER
Called Pt and scheduled appt with Dr Baker for Acute, comminuted and impacted fracture of the left distal radius with intra-articular extension to the radiocarpal joint. On 4/1/25.  Moreno SHERIDAN  ----- Message from Chauncey Tran sent at 3/24/2025 11:06 AM CDT -----  Regarding: pleased schedule  Please schedule follow up with hand in 1-2 weeks     -Chauncey

## 2025-03-24 NOTE — TELEPHONE ENCOUNTER
See prevois note from earilier to Pt Scheduled with Dr Baker.  Moreno SHERIDAN  ----- Message from Chauncey Tran sent at 3/24/2025  1:40 PM CDT -----  Regarding: follow up  Please reach out to patients daughter to schedule follow up with hand in regards to left wrist fracture in 7-10 days. Thanks    -Chauncey

## 2025-03-24 NOTE — PROGRESS NOTES
Resident/Fellow Attestation   I, Colton Mata MD, was present with the medical/BULMARO student who participated in the service and in the documentation of the note.  I have verified the history and personally performed the physical exam and medical decision making.  I agree with the assessment and plan of care as documented in the note.      Today:  - Pending surgical plan from Ortho  - Positive Orthostatics, ordered compression stockings, abdominal binder, 1L IVF. Resume Amlodipine today, continue to hold Carvedilol. Would avoid midodrine given need for BP control with her h/o CVA. Iso peripheral neuropathy 2/2 prolonged DM, anti-HTN medications, known vascular disease, aging, infection, possible dehydration?  - Increased TSH and low T4; increase Levothyroxine to 100mcg  - Increase Lantus to 15u  - Continue CTX for UTI  - As UTI resolves if she continues to have confusion and positive orthostatics may need to consult Neurology for recommendations on further workup +/- BP goals    Colton Mata MD  PGY1  Date of Service (when I saw the patient): 25    [facility]    Progress Note - Medicine Service, MAROON TEAM 2       Date of Admission:  3/23/2025    Assessment & Plan   Isabell Matthews is a 64 year old female admitted on 3/23/2025. She has a notable history of frequent falls, recurrent UTI, vascular dementia, CVA (most recent 2024), HTN, HLD, CKD IIIb, T1DM c/b neuropathy, and seizures. Was admitted for dizziness and falls, found to have L wrist fx. Remains admitted for further workup and surgical consultation.     Updates:    -Orthostatic blood pressure readings from last night were as following:   Supine: 162/105 Pulse 79    Standin/57 Pulse 113  - Low T4 (0.67) with high TSH (37.30) on admission. Plan to increase levothyroxine to 100 mcg daily.  - Continued confusion, oriented to self but not time or place. Made attempts to exit her bed. Bed alarms were put  on.  - Urinary incontinence with no dysuria, increased frequency      AMS?  Dizziness  Recurrent falls  Presenting after an unwitnessed fall morning of 3/23. Was found on floor by pt's daughter noting pain in L side and bleeding from face. Unclear what pt's baseline cognitive status is. Currently is alert and somewhat oriented to place but not to time or self. This appears different from Neuro exam noted at Neurology visit in October 2024 but on discussion with pt's daughter over the phone she reports that Isabell's mentation is unchanged. Has slowed speech but is able to answer questions appropriately and follow commands. Non-con CT head without acute intracranial pathology or fracture. No focal neuro deficits. CXR without infectious signs. EKG sinus rhythm without ST or T wave changes. Normal carotid vasculature in 06/2024. Dizziness may be 2/2 infection, medication s/e, orthostatic hypotension, progression of vascular disease, or arrhythmia.  - Orthostatic BP measurement  - OT/PT evaluation  - RVP  - Bcx obtained and pending (obtained after CTX was given)  - 24 hours of Telemetry; consider Zio patch on discharge  - May need to discuss higher level of care on discharge    L wrist fx  L periorbital Hematoma  2/2 fall as above. Imaging in ED showing acute, comminuted, and impacted fracture of L distal radius. No facial fracture.   - Ortho consult  - Multimodal pain management    Bacturia with possible UTI  Pt has a history of recurrent UTIs. Most recently pan-sensitive E.coli in January 2025. Does not report any sx on admission but UA positive for nitrites and some bacteria. No LE or WBC. S/p 1 dose CTX in ED.   - Ucx pending  - Continue CTX iso AMS, falls, and possible UTI    Hypothyroidism  Pt is on 75 mcg levothyroxine daily. Low T4 (0.67) with high TSH (37.30) on admission.   - Increase to 100 mcg levothyroxine daily         DM1  A1c of 9.6% on January 2025. BS in 300s on admission.   - A1c ordered  - Home  regimen:  LDSSI  Lantus 20u at bedtime  2u mealtime with additional correction scale   - In hospital:  Hypoglycemia protocol  Start 10u Lantus and MDSSI    CKD3b  Baseline Cr of 1.5. Cr on admission of 1.5.   - CTM    HTN  HLD  BP on admission of 203, 140 on repeat after treatment of pain.   - Continue Atorvastatin 40mg  - Holding Amlodipine 5mg  - Holding Carvedilol 12.5mg BID    Prior CVAs (ischemic and hemorrhagic)  Vascular dementia  No focal deficits on exam today.   - Consider Neurology consult in morning  - Holding ASA pending surgical plan  - Recommend getting MOCA once pt is past possible acute infection/recrudesence period and closer to discharge    H/o seizures  Reported history of seizures iso DKA and strokes. None reported in last 4 years  - Continue Keppra 500mg BID      Diet: NPO for Procedure/Surgery per Anesthesia Guidelines Except for: Meds; Clear liquids before procedure/surgery: ADULT (Age GREATER than or Equal to 18 years) - Clear liquids 2 hours before procedure/surgery    DVT Prophylaxis: Pneumatic Compression Devices  Parker Catheter: Not present  Fluids: PO  Lines: None     Cardiac Monitoring: ACTIVE order. Indication: Fall  Code Status: No CPR- Do NOT Intubate      Clinically Significant Risk Factors Present on Admission                 # Drug Induced Platelet Defect: home medication list includes an antiplatelet medication   # Hypertension: Noted on problem list     # Dementia: noted on problem list  # Anemia: based on hgb <11           # Financial/Environmental Concerns:           Social Drivers of Health   Housing Stability: Unknown (8/25/2024)    Housing Stability     Do you have housing? : Patient unable to answer     Are you worried about losing your housing?: Patient unable to answer   Tobacco Use: Medium Risk (3/23/2025)    Patient History     Smoking Tobacco Use: Former     Smokeless Tobacco Use: Never   Financial Resource Strain: Unknown (8/25/2024)    Financial Resource Strain      "Within the past 12 months, have you or your family members you live with been unable to get utilities (heat, electricity) when it was really needed?: Patient unable to answer   Transportation Needs: Unknown (8/25/2024)    Transportation Needs     Within the past 12 months, has lack of transportation kept you from medical appointments, getting your medicines, non-medical meetings or appointments, work, or from getting things that you need?: Patient unable to answer      Disposition Plan     Medically Ready for Discharge: Anticipated in 2-4 Days       The patient's care was discussed with the Attending Physician, Dr. Joshua .    Anthony Oscar  Medical Student  Medicine Service, [address]  Securely message with [employer] (more info)  Text page via [facility] Paging/Directory   See signed in provider for up to date coverage information  ____________________________________________________________________    Subjective    Isabell is more confused on exam today then yesterday. She has slowed speech that is disjointed. She is not oriented to time or place. When asked where she was, she replied she was \"at the football game\". She was not able to answer any questions about how she was feeling or how she presented.     Physical Exam   Vital Signs: Temp: 99.1  F (37.3  C) Temp src: Oral BP: (!) 166/107 Pulse: 101   Resp: 16 SpO2: 94 % O2 Device: None (Room air)    Weight: 0 lbs 0 oz    General: Sitting in bed comfortably. NAD. Well developed and nourished.  HEENT: NC, MMM, EOMI with 1 mm pupils bilaterally, clear conjunctivae, normal oropharynx. Left periorbital swelling and ecchymosis.   Cardiovascular: RRR, No murmurs, rubs, or gallops. Normal S1 and S2.  Pulmonary: CTAB, No wheezes/rhonchi/rales, Normal inspiratory effort.  Abdominal: Soft, non-distended, non-tender. Normal bowel sounds.  Extremities: Left wrist pain with minimal movement. Bilateral hip pain with " right hip bruising.    Skin: Clean, warm, dry, intact  Neuro: A&O to self, not orientated to time or place. Speech is slow and disjointed. CN II-VII intact, not able to complete Neuro exam due to confusion. Moving all extremities.      Medical Decision Making         Data   Imaging results reviewed over the past 24 hrs:   Recent Results (from the past 24 hours)   XR Pelvis w 1 View Each Hip    Narrative    EXAM: XR PELVIS AND HIP BILATERAL 1 VIEW  LOCATION: [facility]  DATE: 3/23/2025    INDICATION: Fall in AM with pain in left hip  COMPARISON: 01/20/2025      Impression    IMPRESSION: Bilateral total hip replacements. No evidence of an acute displaced fracture or dislocation on either side. Bones are demineralized.     Atherosclerotic vascular calcifications.

## 2025-03-24 NOTE — PROGRESS NOTES
S: Order received to fit patient with a Left as ordered by  .  O/G: Support and stabilize left wrist  A:  Patient was fit with a [employer]care wrist brace universal size.  The fit of the Brace is adequate.  P: contact orthotics if any issues arise.  FAHAD Yoon.

## 2025-03-24 NOTE — PLAN OF CARE
Goal Outcome Evaluation:      Plan of Care Reviewed With: patient, child    Overall Patient Progress: no changeOverall Patient Progress: no change     Shift: [id number]    A&Ox1 (oriented to self) throughout shift w delayed responses. NPO until early evening, then changed to reg diet, dinner ordered for pt. BG monitored and insulin given (per provider order while NPO dt high BG levels). In AM, pt had one occurrence of holding water in mouth and not swallowing despite prompting, was able to spit water back into cup after some prompting.     Pt incontinent of urine x2 this shift, no BM this shift. Pt monitored and checked for incontinence throughout shift. L wrist brace placed on pt, keep on dt radial fracture, pt tolerated well. Ax1-2 when oob. Pt did not attempt to exit bed this shift, pt spent shift sleeping (despite attempts to keep awake) and watching TV.

## 2025-03-25 ENCOUNTER — TELEPHONE (OUTPATIENT)
Dept: ORTHOPEDICS | Facility: CLINIC | Age: 65
End: 2025-03-25
Payer: COMMERCIAL

## 2025-03-25 ENCOUNTER — APPOINTMENT (OUTPATIENT)
Dept: PHYSICAL THERAPY | Facility: CLINIC | Age: 65
DRG: 563 | End: 2025-03-25
Payer: COMMERCIAL

## 2025-03-25 LAB
ANION GAP SERPL CALCULATED.3IONS-SCNC: 12 MMOL/L (ref 7–15)
BUN SERPL-MCNC: 26 MG/DL (ref 8–23)
CALCIUM SERPL-MCNC: 9.2 MG/DL (ref 8.8–10.4)
CHLORIDE SERPL-SCNC: 105 MMOL/L (ref 98–107)
CREAT SERPL-MCNC: 1.42 MG/DL (ref 0.51–0.95)
EGFRCR SERPLBLD CKD-EPI 2021: 41 ML/MIN/1.73M2
ERYTHROCYTE [DISTWIDTH] IN BLOOD BY AUTOMATED COUNT: 14.5 % (ref 10–15)
GLUCOSE BLDC GLUCOMTR-MCNC: 146 MG/DL (ref 70–99)
GLUCOSE BLDC GLUCOMTR-MCNC: 206 MG/DL (ref 70–99)
GLUCOSE BLDC GLUCOMTR-MCNC: 301 MG/DL (ref 70–99)
GLUCOSE BLDC GLUCOMTR-MCNC: 322 MG/DL (ref 70–99)
GLUCOSE BLDC GLUCOMTR-MCNC: 330 MG/DL (ref 70–99)
GLUCOSE BLDC GLUCOMTR-MCNC: 345 MG/DL (ref 70–99)
GLUCOSE SERPL-MCNC: 212 MG/DL (ref 70–99)
HCO3 SERPL-SCNC: 22 MMOL/L (ref 22–29)
HCT VFR BLD AUTO: 35.3 % (ref 35–47)
HGB BLD-MCNC: 11.1 G/DL (ref 11.7–15.7)
MCH RBC QN AUTO: 27.9 PG (ref 26.5–33)
MCHC RBC AUTO-ENTMCNC: 31.4 G/DL (ref 31.5–36.5)
MCV RBC AUTO: 89 FL (ref 78–100)
PLATELET # BLD AUTO: 206 10E3/UL (ref 150–450)
POTASSIUM SERPL-SCNC: 4.3 MMOL/L (ref 3.4–5.3)
RBC # BLD AUTO: 3.98 10E6/UL (ref 3.8–5.2)
SODIUM SERPL-SCNC: 139 MMOL/L (ref 135–145)
WBC # BLD AUTO: 5.9 10E3/UL (ref 4–11)

## 2025-03-25 PROCEDURE — 120N000002 HC R&B MED SURG/OB UMMC

## 2025-03-25 PROCEDURE — 80048 BASIC METABOLIC PNL TOTAL CA: CPT

## 2025-03-25 PROCEDURE — 99232 SBSQ HOSP IP/OBS MODERATE 35: CPT | Mod: GC | Performed by: STUDENT IN AN ORGANIZED HEALTH CARE EDUCATION/TRAINING PROGRAM

## 2025-03-25 PROCEDURE — 250N000013 HC RX MED GY IP 250 OP 250 PS 637

## 2025-03-25 PROCEDURE — 999N000128 HC STATISTIC PERIPHERAL IV START W/O US GUIDANCE

## 2025-03-25 PROCEDURE — 97161 PT EVAL LOW COMPLEX 20 MIN: CPT | Mod: GP

## 2025-03-25 PROCEDURE — 82962 GLUCOSE BLOOD TEST: CPT

## 2025-03-25 PROCEDURE — 36415 COLL VENOUS BLD VENIPUNCTURE: CPT

## 2025-03-25 PROCEDURE — 85014 HEMATOCRIT: CPT

## 2025-03-25 PROCEDURE — 250N000011 HC RX IP 250 OP 636: Performed by: STUDENT IN AN ORGANIZED HEALTH CARE EDUCATION/TRAINING PROGRAM

## 2025-03-25 PROCEDURE — 97530 THERAPEUTIC ACTIVITIES: CPT | Mod: GP

## 2025-03-25 PROCEDURE — 82435 ASSAY OF BLOOD CHLORIDE: CPT

## 2025-03-25 RX ORDER — CARVEDILOL 6.25 MG/1
12.5 TABLET ORAL ONCE
Status: COMPLETED | OUTPATIENT
Start: 2025-03-25 | End: 2025-03-25

## 2025-03-25 RX ORDER — CEFTRIAXONE 1 G/1
1 INJECTION, POWDER, FOR SOLUTION INTRAMUSCULAR; INTRAVENOUS EVERY 24 HOURS
Status: DISCONTINUED | OUTPATIENT
Start: 2025-03-25 | End: 2025-03-25

## 2025-03-25 RX ORDER — DEXTROSE MONOHYDRATE 25 G/50ML
25-50 INJECTION, SOLUTION INTRAVENOUS
Status: DISCONTINUED | OUTPATIENT
Start: 2025-03-25 | End: 2025-03-26

## 2025-03-25 RX ORDER — NICOTINE POLACRILEX 4 MG
15-30 LOZENGE BUCCAL
Status: DISCONTINUED | OUTPATIENT
Start: 2025-03-25 | End: 2025-03-26

## 2025-03-25 RX ORDER — CEFTRIAXONE 1 G/1
1 INJECTION, POWDER, FOR SOLUTION INTRAMUSCULAR; INTRAVENOUS EVERY 24 HOURS
Status: DISCONTINUED | OUTPATIENT
Start: 2025-03-25 | End: 2025-03-26

## 2025-03-25 RX ADMIN — ATORVASTATIN CALCIUM 40 MG: 20 TABLET, FILM COATED ORAL at 08:15

## 2025-03-25 RX ADMIN — METHOCARBAMOL 500 MG: 500 TABLET ORAL at 11:46

## 2025-03-25 RX ADMIN — LEVETIRACETAM 500 MG: 500 TABLET, FILM COATED ORAL at 08:14

## 2025-03-25 RX ADMIN — GABAPENTIN 100 MG: 100 CAPSULE ORAL at 21:00

## 2025-03-25 RX ADMIN — CYANOCOBALAMIN TAB 500 MCG 1000 MCG: 500 TAB at 08:15

## 2025-03-25 RX ADMIN — CARVEDILOL 12.5 MG: 6.25 TABLET, FILM COATED ORAL at 21:58

## 2025-03-25 RX ADMIN — LEVETIRACETAM 500 MG: 500 TABLET, FILM COATED ORAL at 21:00

## 2025-03-25 RX ADMIN — CEFTRIAXONE SODIUM 1 G: 1 INJECTION, POWDER, FOR SOLUTION INTRAMUSCULAR; INTRAVENOUS at 13:09

## 2025-03-25 RX ADMIN — AMLODIPINE BESYLATE 5 MG: 5 TABLET ORAL at 08:12

## 2025-03-25 RX ADMIN — LEVOTHYROXINE SODIUM 100 MCG: 0.05 TABLET ORAL at 08:12

## 2025-03-25 RX ADMIN — LORATADINE 10 MG: 10 TABLET ORAL at 08:15

## 2025-03-25 RX ADMIN — DULOXETINE HYDROCHLORIDE 20 MG: 20 CAPSULE, DELAYED RELEASE ORAL at 08:15

## 2025-03-25 RX ADMIN — INSULIN GLARGINE 10 UNITS: 100 INJECTION, SOLUTION SUBCUTANEOUS at 21:00

## 2025-03-25 ASSESSMENT — ACTIVITIES OF DAILY LIVING (ADL)
ADLS_ACUITY_SCORE: 77
ADLS_ACUITY_SCORE: 77
ADLS_ACUITY_SCORE: 68
ADLS_ACUITY_SCORE: 69
ADLS_ACUITY_SCORE: 69
ADLS_ACUITY_SCORE: 77
ADLS_ACUITY_SCORE: 69
ADLS_ACUITY_SCORE: 77
ADLS_ACUITY_SCORE: 68
ADLS_ACUITY_SCORE: 68
ADLS_ACUITY_SCORE: 69
ADLS_ACUITY_SCORE: 77
ADLS_ACUITY_SCORE: 69
ADLS_ACUITY_SCORE: 68
ADLS_ACUITY_SCORE: 69

## 2025-03-25 NOTE — PROGRESS NOTES
Nursing Focus: Admission    D: Patient admitted from ED/obs via bed for wrist fracture, left, closed.    I: Upon arrival to the unit patient was oriented to room, unit, and call light. Patient s height, weight, and vital signs were obtained. Allergies reviewed and allergy band applied. MD notified of patient s arrival on the unit. Adult AVS completed by bedside RN. Head to toe assessment completed. Education assessment completed. Care plan initiated.     A: Vital signs stable upon admission except hypertension; provider aware. Patient rates pain at 0/10. PAINAD score 0. Two RN skin assessment completed: Yes. Second RN was Elizabeth BA Significant Skin Findings include left periorbital hematoma, right ear blister (currently intact/not popped), and scattered bruises to both arms. [facility] Nurse Consult Ordered: No. Bed Algorithm can be found in PCS flow sheets (Support Surface Algorithm) and on IP [facility] NURSE RESOURCE TAB, was this used during this assessment? No.     P: Continue to monitor patient s blood pressures, blood sugars, and stability of left wrist and intervene as needed. Continue with plan of care. Notify MD with any concerns or changes in patient status.

## 2025-03-25 NOTE — TELEPHONE ENCOUNTER
DIAGNOSIS: Acute, comminuted and impacted fracture of the left distal radius with intra-articular extension to the radiocarpal joint.    APPOINTMENT DATE: 4/1/25   NOTES STATUS DETAILS   DISCHARGE REPORT from the ER Internal 3/23/25 - [facility] ED    MEDICATION LIST Internal    XRAYS (IMAGES & REPORTS) Internal 3/23/25 - XR Elbow left  3/23/25 - XR Forearm left  3/23/25 - XR Wrist left   3/23/25 - XR Hand left

## 2025-03-25 NOTE — PROGRESS NOTES
Provider notification;    Doctor Reg notified at 0248 Via [employer].    Reason for notification: Pt up to floor. No apparent distress. /76. Last , next insulin due 0730. Please advise.    Plan: Correction insulin x 1 dose was added. Continue to monitor B/P, no further B/P intervention initiated.

## 2025-03-25 NOTE — PLAN OF CARE
Goal Outcome Evaluation:    Plan of Care Reviewed With: patient    Overall Patient Progress: no change    Outcome Evaluation: [id number]    Hypertensive, team aware. Other vitals stable on RA. Oriented to self. Bed alarm on. PRN Robaxin given x1 for c/o L arm pain. Pt still reporting severe pain after Robaxin, team notified. Regular diet, appetite fair. Needs help ordering meals. BG checks AC/HS. Takes pills whole (one at a time) with water. Voiding via purewick. No BM this shift. Left wrist brace in place. Continue to monitor and with POC.

## 2025-03-25 NOTE — PROGRESS NOTES
[facility]    Progress Note - Medicine Service, MAROON TEAM 2       Date of Admission:  3/23/2025    Assessment & Plan   Isabell Matthews is a 64 year old female admitted on 3/23/2025. She has a notable history of frequent falls, recurrent UTI, vascular dementia, CVA (most recent 06/2024), HTN, HLD, CKD IIIb, T1DM c/b neuropathy, and seizures. Was admitted for dizziness and falls, found to have L wrist fx. Remains admitted for further workup and surgical consultation.     Updates:    - Isabell pulled off telemetry sensors overnight, because of discomfort.  - Continue CTX   - Recheck for orthostatic blood pressure readings.  - Continued confusion, oriented to self and place but not to time.   - Ortho consult determined surgery was not indicated, follow up in 1 week.      AMS?  Dizziness  Recurrent falls  Presenting after an unwitnessed fall morning of 3/23. Was found on floor by pt's daughter noting pain in L side and bleeding from face. Unclear what pt's baseline cognitive status is. Currently is alert and somewhat oriented to place but not to time or self. This appears different from Neuro exam noted at Neurology visit in October 2024 but on discussion with pt's daughter over the phone she reports that Isabell's mentation is unchanged. Has slowed speech but is able to answer questions appropriately and follow commands. Non-con CT head without acute intracranial pathology or fracture. No focal neuro deficits. CXR without infectious signs. EKG sinus rhythm without ST or T wave changes. Normal carotid vasculature in 06/2024. Dizziness may be 2/2 infection, medication s/e, orthostatic hypotension, progression of vascular disease, or arrhythmia.  - Orthostatic BP measurement  - OT/PT evaluation  - RVP  - Bcx obtained and pending (obtained after CTX was given)  - Telemetry was not tolerated by patient   - May need to discuss higher level of care and Neuro outpatient on discharge   -  Increasing pain with movement rated at a 9/10     L wrist fx  L periorbital Hematoma  2/2 fall as above. Imaging in ED showing acute, comminuted, and impacted fracture of L distal radius. No facial fracture.   - Ortho determined that surgery was not indicated, recommended wearing the brace continuously and following up in a week   - Multimodal pain management    Bacturia with possible UTI  Pt has a history of recurrent UTIs. Most recently pan-sensitive E.coli in January 2025. Does not report any sx on admission but UA positive for nitrites and some bacteria. No LE or WBC. S/p 1 dose CTX in ED.   - Ucx pending  - Continue CTX iso AMS, falls, and possible UTI    Hypothyroidism  Pt is on 75 mcg levothyroxine daily. Low T4 (0.67) with high TSH (37.30) on admission.   - Increase to 100 mcg levothyroxine daily         DM1  A1c of 9.6% on January 2025. BS in 300s on admission.   - A1c ordered  - Home regimen:  LDSSI  Lantus 20u at bedtime  2u mealtime with additional correction scale   - In hospital:  Hypoglycemia protocol  Start 10u Lantus and MDSSI    CKD3b  Baseline Cr of 1.5. Cr on admission of 1.5.   - CTM    HTN  HLD  BP on admission of 203, 140 on repeat after treatment of pain.   - Continue Atorvastatin 40mg  - Holding Amlodipine 5mg  - Holding Carvedilol 12.5mg BID    Prior CVAs (ischemic and hemorrhagic)  Vascular dementia  No focal deficits on exam today.   - Consider Neurology consult in morning  - Holding ASA pending surgical plan  - Recommend getting MOCA once pt is past possible acute infection/recrudesence period and closer to discharge    H/o seizures  Reported history of seizures iso DKA and strokes. None reported in last 4 years  - Continue Keppra 500mg BID      Diet: Regular Diet Adult    DVT Prophylaxis: Pneumatic Compression Devices  Parker Catheter: Not present  Fluids: PO  Lines: None     Cardiac Monitoring: None  Code Status: No CPR- Do NOT Intubate      Clinically Significant Risk Factors                    # Hypertension: Noted on problem list     # Dementia: noted on problem list            # Financial/Environmental Concerns:           Social Drivers of Health   Food Insecurity: Unknown (3/25/2025)    Food Insecurity     Within the past 12 months, did you worry that your food would run out before you got money to buy more?: Patient unable to answer     Within the past 12 months, did the food you bought just not last and you didn t have money to get more?: Patient unable to answer   Housing Stability: Unknown (3/25/2025)    Housing Stability     Do you have housing? : Patient unable to answer     Are you worried about losing your housing?: Patient unable to answer   Tobacco Use: Medium Risk (3/23/2025)    Patient History     Smoking Tobacco Use: Former     Smokeless Tobacco Use: Never   Financial Resource Strain: Unknown (3/25/2025)    Financial Resource Strain     Within the past 12 months, have you or your family members you live with been unable to get utilities (heat, electricity) when it was really needed?: Patient unable to answer   Transportation Needs: Unknown (3/25/2025)    Transportation Needs     Within the past 12 months, has lack of transportation kept you from medical appointments, getting your medicines, non-medical meetings or appointments, work, or from getting things that you need?: Patient unable to answer   Interpersonal Safety: Unknown (3/25/2025)    Interpersonal Safety     Do you feel physically and emotionally safe where you currently live?: Patient unable to answer     Within the past 12 months, have you been hit, slapped, kicked or otherwise physically hurt by someone?: Patient unable to answer     Within the past 12 months, have you been humiliated or emotionally abused in other ways by your partner or ex-partner?: Patient unable to answer      Disposition Plan     Medically Ready for Discharge: Anticipated in 2-4 Days       The patient's care was discussed with the Attending  Physician, Dr. Granados .    Anthony Moore  Medical Student  Medicine Service, MAROON TEAM 2  [facility]  Securely message with [employer] (more info)  Text page via [employer] Paging/Directory   See signed in provider for up to date coverage information  ____________________________________________________________________    I saw the patient with the medical student and agree with the findings    Marcia Stafford  Internal Medicine Resident PGY3  [facility]     Subjective    Talked to Isabell's daughter Nehal about the plan here in the hospital including continuing CTX, rechecking orthostatic blood pressures and getting a consult with PT/OT. Her daughter would like to be present for PT consult as she has PT/OT/home nurse at home and would like any suggestions for after discharge. Nehal reports that she also lives with Isabell and participates in her care. She reports the dizziness that was present with fall has been a continuing issue and PT suggested getting an appointment with an ENT to discuss a possible vertigo diagnosis.    Discussed the possibility a of higher level of care for her mother. She reported that she was consulting with a  and was planning an at home appointment. Notes weighing options of an assisted living type care in [place] where the rest of Isabell's family is located.       Physical Exam   Vital Signs: Temp: 97.9  F (36.6  C) Temp src: Axillary BP: (!) 164/75 Pulse: 75   Resp: 17 SpO2: 98 % O2 Device: None (Room air)    Weight: 140 lbs 10.46 oz    General: Sitting in bed comfortably. NAD. Well developed and nourished.  HEENT: NC, MMM, EOMI with 1 mm pupils bilaterally, clear conjunctivae, normal oropharynx. Left periorbital swelling and ecchymosis.   Cardiovascular: RRR, No murmurs, rubs, or gallops. Normal S1 and S2.  Pulmonary: CTAB, No wheezes/rhonchi/rales, Normal inspiratory effort.  Abdominal: Soft, non-distended,  non-tender. Normal bowel sounds.  Extremities: Left wrist pain with minimal movement. Bilateral hip pain with right hip bruising.    Skin: Clean, warm, dry, intact  Neuro: A&O to self and place, not orientated to time. Speech is slow and disjointed. Moving all extremities.      Medical Decision Making         Data   Imaging results reviewed over the past 24 hrs:   No results found for this or any previous visit (from the past 24 hours).

## 2025-03-25 NOTE — PROGRESS NOTES
Transfer  Transferred to:  around 0220.  Via:bed.  Reason for transfer:Inpatient status.  Belongings: Packed and sent with pt.  Medications: Meds sent to new unit with pt.  Report given to: Essence Gross RN.  Pt status: Vitally stable. Notified RN of recent vitals and BG. Denies pain. Cleaned up and repositioned.    Neuro: A/Ox1 to self. Delayed responses. Able to follow commands.   Cardiac/Tele: VSS. On tele-SR. Afebrile.  Respiratory: Sats >95% on room air. Denies SOB.   GI/: Incontinent. Purewick in place. Adequate urine output. Last BM unknown.   Diet/Appetite: Regular diet. ACHS BG.  Skin: L eye bruise, R hip bruise, R foraerm bruise and x2 scabbed lacerations. Petechiae over torso, thighs and upper arms.    LDAs: PIV- SL.  Activity: Assist of 2, turn and repo as needed.  Pain: Denied pain. Splint on L wrist.      Plan: Continue plan of care and notify team of changes or concerns.    Shift: 2300-transfer at about 0220.

## 2025-03-25 NOTE — PLAN OF CARE
Goal Outcome Evaluation: [id number]      Plan of Care Reviewed With: patient    Overall Patient Progress: no change    Pt oriented only to self, quiet and needs extra time to respond to questions or prompts. Bed alarm in place. Pt intermittently, not consistently cooperative. Has not made attempt to leave bed overnight. Removed her telemetry, provider updated. Pt had very high B/P on admit (185/76) - provider aware, no further intervention or PRNs ordered. Pt has 1 PIV, SL. Pt denies pain, and no nonverbal signs of pain noted. Pt blood sugar 345 in ED, provider placed a one-time additional sliding scale insulin dose which was given after updated BG check of 322 at 0428. Pt quite mobile in bed. Purewick in place, chux pads changed x 1 overnight and Purewick replaced. No BM tonight, unknown LBM date. Per report pt incontinent of both bowel and bladder. Pt needs an assist of 2 for transfers. Left wrist has brace on it - avoiding putting pressure on it. Continue with POC.

## 2025-03-25 NOTE — PLAN OF CARE
BP (!) 175/78 (BP Location: Right arm)   Pulse 92   Temp 98.3  F (36.8  C) (Oral)   Resp 18   SpO2 98%    Time: 8136-0783  Patient is alert and oriented to self, on regular diet, BG at 2200,  286, received insulin per sliding scale, denied pain or discomfort, VS stable.

## 2025-03-25 NOTE — TELEPHONE ENCOUNTER
----- Message from Beverly BLEVINS sent at 3/25/2025 12:33 PM CDT -----  Sounds good, thank you ma'am.  ----- Message -----  From: Ramila Conner  Sent: 3/25/2025  12:28 PM CDT  To: Beverly Mathew, ATC    Hey!    Looks like she is expected to be in the hospital for the next 2-4 days. I can still call to check though.    Just wanted to give you a heads up,    Ramila  ----- Message -----  From: Beverly Mathew, ATC  Sent: 3/25/2025  12:26 PM CDT  To: [facility]    Patient is currently scheduled to see Dr. Baker 4/1/25. Dr. Baker would like to see her sooner if patient is willing. Can you please reach out and see if she is willing to see Dr. Baker tomorrow 3/26/25 at 2:00pm?     Thank you!

## 2025-03-25 NOTE — TELEPHONE ENCOUNTER
Spoke with patient's daughter (only phone on file and explained a C2C needs to be filled out. Patient's daughter will bring this up at hospital today). Patient's daughter does not want to move 4/1/25 appointment because patient is still in hospital. Writer will inform Dr. Baker's team.

## 2025-03-25 NOTE — PROGRESS NOTES
[facility]  Patient is currently open to home care services with [facility]. The patient has  RN  PT OT [facility]   and team have been notified of patient admission. The end of this  episode is 3/26/25.   [facility] liaison will continue to follow patient during stay. If  appropriate provide orders to resume home care at time of discharge.

## 2025-03-25 NOTE — PROGRESS NOTES
Provider notification;    Doctor Reg notified at 0538 [address].    Reason for notification: Pt refusing telemetry. Pulled it off, won't keep it on d/t discomfort it causes.    Plan: No current changes to plan.

## 2025-03-26 ENCOUNTER — APPOINTMENT (OUTPATIENT)
Dept: OCCUPATIONAL THERAPY | Facility: CLINIC | Age: 65
DRG: 563 | End: 2025-03-26
Payer: COMMERCIAL

## 2025-03-26 ENCOUNTER — APPOINTMENT (OUTPATIENT)
Dept: PHYSICAL THERAPY | Facility: CLINIC | Age: 65
DRG: 563 | End: 2025-03-26
Payer: COMMERCIAL

## 2025-03-26 LAB
ALBUMIN SERPL BCG-MCNC: 3.7 G/DL (ref 3.5–5.2)
ALP SERPL-CCNC: 160 U/L (ref 40–150)
ALT SERPL W P-5'-P-CCNC: 26 U/L (ref 0–50)
ANION GAP SERPL CALCULATED.3IONS-SCNC: 11 MMOL/L (ref 7–15)
AST SERPL W P-5'-P-CCNC: 28 U/L (ref 0–45)
BILIRUB SERPL-MCNC: 0.3 MG/DL
BUN SERPL-MCNC: 27.6 MG/DL (ref 8–23)
CALCIUM SERPL-MCNC: 9.2 MG/DL (ref 8.8–10.4)
CHLORIDE SERPL-SCNC: 100 MMOL/L (ref 98–107)
CREAT SERPL-MCNC: 1.52 MG/DL (ref 0.51–0.95)
EGFRCR SERPLBLD CKD-EPI 2021: 37 ML/MIN/1.73M2
ERYTHROCYTE [DISTWIDTH] IN BLOOD BY AUTOMATED COUNT: 14.4 % (ref 10–15)
GLUCOSE BLDC GLUCOMTR-MCNC: 179 MG/DL (ref 70–99)
GLUCOSE BLDC GLUCOMTR-MCNC: 190 MG/DL (ref 70–99)
GLUCOSE BLDC GLUCOMTR-MCNC: 278 MG/DL (ref 70–99)
GLUCOSE BLDC GLUCOMTR-MCNC: 292 MG/DL (ref 70–99)
GLUCOSE BLDC GLUCOMTR-MCNC: 364 MG/DL (ref 70–99)
GLUCOSE BLDC GLUCOMTR-MCNC: 393 MG/DL (ref 70–99)
GLUCOSE SERPL-MCNC: 392 MG/DL (ref 70–99)
HCO3 SERPL-SCNC: 24 MMOL/L (ref 22–29)
HCT VFR BLD AUTO: 35.5 % (ref 35–47)
HGB BLD-MCNC: 11.3 G/DL (ref 11.7–15.7)
MCH RBC QN AUTO: 27.4 PG (ref 26.5–33)
MCHC RBC AUTO-ENTMCNC: 31.8 G/DL (ref 31.5–36.5)
MCV RBC AUTO: 86 FL (ref 78–100)
PLATELET # BLD AUTO: 209 10E3/UL (ref 150–450)
POTASSIUM SERPL-SCNC: 4.3 MMOL/L (ref 3.4–5.3)
PROT SERPL-MCNC: 6.5 G/DL (ref 6.4–8.3)
RBC # BLD AUTO: 4.12 10E6/UL (ref 3.8–5.2)
SODIUM SERPL-SCNC: 135 MMOL/L (ref 135–145)
WBC # BLD AUTO: 5.6 10E3/UL (ref 4–11)

## 2025-03-26 PROCEDURE — 250N000011 HC RX IP 250 OP 636

## 2025-03-26 PROCEDURE — 250N000013 HC RX MED GY IP 250 OP 250 PS 637

## 2025-03-26 PROCEDURE — 97530 THERAPEUTIC ACTIVITIES: CPT | Mod: GP

## 2025-03-26 PROCEDURE — 85027 COMPLETE CBC AUTOMATED: CPT

## 2025-03-26 PROCEDURE — 120N000002 HC R&B MED SURG/OB UMMC

## 2025-03-26 PROCEDURE — 97165 OT EVAL LOW COMPLEX 30 MIN: CPT | Mod: GO | Performed by: OCCUPATIONAL THERAPIST

## 2025-03-26 PROCEDURE — 99255 IP/OBS CONSLTJ NEW/EST HI 80: CPT | Mod: 24

## 2025-03-26 PROCEDURE — 36415 COLL VENOUS BLD VENIPUNCTURE: CPT

## 2025-03-26 PROCEDURE — 97530 THERAPEUTIC ACTIVITIES: CPT | Mod: GO | Performed by: OCCUPATIONAL THERAPIST

## 2025-03-26 PROCEDURE — 99232 SBSQ HOSP IP/OBS MODERATE 35: CPT | Mod: GC | Performed by: STUDENT IN AN ORGANIZED HEALTH CARE EDUCATION/TRAINING PROGRAM

## 2025-03-26 PROCEDURE — 80053 COMPREHEN METABOLIC PANEL: CPT

## 2025-03-26 PROCEDURE — 97535 SELF CARE MNGMENT TRAINING: CPT | Mod: GO | Performed by: OCCUPATIONAL THERAPIST

## 2025-03-26 RX ORDER — NICOTINE POLACRILEX 4 MG
15-30 LOZENGE BUCCAL
Status: DISCONTINUED | OUTPATIENT
Start: 2025-03-26 | End: 2025-03-30 | Stop reason: HOSPADM

## 2025-03-26 RX ORDER — ACETAMINOPHEN 325 MG/1
975 TABLET ORAL EVERY 6 HOURS PRN
Status: DISCONTINUED | OUTPATIENT
Start: 2025-03-26 | End: 2025-03-27

## 2025-03-26 RX ORDER — PIPERACILLIN SODIUM, TAZOBACTAM SODIUM 2; .25 G/10ML; G/10ML
2.25 INJECTION, POWDER, LYOPHILIZED, FOR SOLUTION INTRAVENOUS EVERY 6 HOURS
Status: DISCONTINUED | OUTPATIENT
Start: 2025-03-26 | End: 2025-03-27

## 2025-03-26 RX ORDER — DEXTROSE MONOHYDRATE 25 G/50ML
25-50 INJECTION, SOLUTION INTRAVENOUS
Status: DISCONTINUED | OUTPATIENT
Start: 2025-03-26 | End: 2025-03-30 | Stop reason: HOSPADM

## 2025-03-26 RX ADMIN — LEVETIRACETAM 500 MG: 500 TABLET, FILM COATED ORAL at 08:54

## 2025-03-26 RX ADMIN — LEVOTHYROXINE SODIUM 100 MCG: 0.05 TABLET ORAL at 08:54

## 2025-03-26 RX ADMIN — PIPERACILLIN AND TAZOBACTAM 2.25 G: 2; .25 INJECTION, POWDER, FOR SOLUTION INTRAVENOUS at 18:06

## 2025-03-26 RX ADMIN — CYANOCOBALAMIN TAB 500 MCG 1000 MCG: 500 TAB at 08:54

## 2025-03-26 RX ADMIN — ACETAMINOPHEN 975 MG: 325 TABLET, FILM COATED ORAL at 12:10

## 2025-03-26 RX ADMIN — DULOXETINE HYDROCHLORIDE 20 MG: 20 CAPSULE, DELAYED RELEASE ORAL at 08:54

## 2025-03-26 RX ADMIN — LORATADINE 10 MG: 10 TABLET ORAL at 08:54

## 2025-03-26 RX ADMIN — PIPERACILLIN AND TAZOBACTAM 2.25 G: 2; .25 INJECTION, POWDER, FOR SOLUTION INTRAVENOUS at 12:17

## 2025-03-26 RX ADMIN — ATORVASTATIN CALCIUM 40 MG: 20 TABLET, FILM COATED ORAL at 08:54

## 2025-03-26 RX ADMIN — LEVETIRACETAM 500 MG: 500 TABLET, FILM COATED ORAL at 20:07

## 2025-03-26 RX ADMIN — AMLODIPINE BESYLATE 5 MG: 5 TABLET ORAL at 08:54

## 2025-03-26 RX ADMIN — GABAPENTIN 100 MG: 100 CAPSULE ORAL at 22:20

## 2025-03-26 ASSESSMENT — ACTIVITIES OF DAILY LIVING (ADL)
DEPENDENT_IADLS:: CLEANING;COOKING;LAUNDRY;SHOPPING;MEAL PREPARATION;MEDICATION MANAGEMENT;MONEY MANAGEMENT;TRANSPORTATION;INCONTINENCE
ADLS_ACUITY_SCORE: 70
ADLS_ACUITY_SCORE: 69
ADLS_ACUITY_SCORE: 70
ADLS_ACUITY_SCORE: 69
ADLS_ACUITY_SCORE: 69
ADLS_ACUITY_SCORE: 70
ADLS_ACUITY_SCORE: 69
ADLS_ACUITY_SCORE: 70
ADLS_ACUITY_SCORE: 69
ADLS_ACUITY_SCORE: 70
ADLS_ACUITY_SCORE: 69
ADLS_ACUITY_SCORE: 70
ADLS_ACUITY_SCORE: 70

## 2025-03-26 NOTE — PLAN OF CARE
"Goal Outcome Evaluation:      Plan of Care Reviewed With: patient    Overall Patient Progress: no changeOverall Patient Progress: no change       Time    BP (!) 172/85 (Cuff Size: Adult Regular)   Pulse 95   Temp 98.3  F (36.8  C) (Oral)   Resp 16   Ht 1.6 m (5' 2.99\")   Wt 63.8 kg (140 lb 10.5 oz)   SpO2 96%   BMI 24.92 kg/m      Activity: Assistive 2 with gate belt and walker  Pain: Pt denies any pain nausea and vomiting  Neuro: Pt is only oriented to self  Cardiac: Pt is hypertensive, PTA hypertension medications were resumed last night  Respiratory: WNL  GI/: Purewick with GUOP, and passing gas  Diet: Regular diet  Lines: PIV X2        Continue to monitor and follow POC      "

## 2025-03-26 NOTE — PLAN OF CARE
Goal Outcome Evaluation:      Plan of Care Reviewed With: patient    Overall Patient Progress: no changeOverall Patient Progress: no change    Outcome Evaluation: VSS X HTN. AOx1, dt place time and situation. Bruising on L eye and R flank with cut near eye from fall. L wrist fracture, in sling. Up ax2 to chair and bed. Gets orthostatic hypotension and dizziness with standing. Bed alarm on. Room near unit station. Good apetite. D/c 1-2 days per note, likely home with daughter.

## 2025-03-26 NOTE — CONSULTS
Care Management Initial Consult    General Information  Assessment completed with: Family, Debra  Type of CM/SW Visit: Initial Assessment    Primary Care Provider verified and updated as needed: Yes   Readmission within the last 30 days: no previous admission in last 30 days      Reason for Consult: discharge planning  Advance Care Planning: Advance Care Planning Reviewed: no concerns identified          Communication Assessment  Patient's communication style: spoken language (English or Bilingual)    Hearing Difficulty or Deaf: no   Wear Glasses or Blind: no    Cognitive  Cognitive/Neuro/Behavioral: .WDL except, orientation, level of consciousness  Level of Consciousness: confused  Arousal Level: opens eyes spontaneously  Orientation: disoriented to, place, time, situation  Mood/Behavior: cooperative, calm  Best Language: 0 - No aphasia  Speech: clear    Living Environment:   People in home: child(tiffany), adult  Vishal-southr  Current living Arrangements: apartment      Able to return to prior arrangements: yes       Family/Social Support:  Care provided by: child(tiffany), other (see comments) (personal care attendant)  Provides care for: no one, unable/limited ability to care for self  Marital Status:   Support system: Children, PCA          Description of Support System: Supportive, Involved    Support Assessment: Adequate family and caregiver support, Adequate social supports    Current Resources:   Patient receiving home care services: Yes  Skilled Home Care Services: Skilled Nursing, Physical Therapy, Occupational Therapy (SW)     Community Resources: DME, PCA, Financial/Insurance, County Programs, County Worker, Home Care,   Equipment currently used at home: walker, standard, wheelchair, manual  Supplies currently used at home: Incontinence Supplies, Diabetic Supplies    Employment/Financial:  Employment Status: retired        Financial Concerns: none           Does the patient's insurance  plan have a 3 day qualifying hospital stay waiver?  No    Lifestyle & Psychosocial Needs:  Social Drivers of Health     Food Insecurity: Unknown (3/25/2025)    Food Insecurity     Within the past 12 months, did you worry that your food would run out before you got money to buy more?: Patient unable to answer     Within the past 12 months, did the food you bought just not last and you didn t have money to get more?: Patient unable to answer   Depression: Not at risk (11/21/2024)    PHQ-2     PHQ-2 Score: 2   Housing Stability: Unknown (3/25/2025)    Housing Stability     Do you have housing? : Patient unable to answer     Are you worried about losing your housing?: Patient unable to answer   Tobacco Use: Medium Risk (3/23/2025)    Patient History     Smoking Tobacco Use: Former     Smokeless Tobacco Use: Never     Passive Exposure: Not on file   Financial Resource Strain: Unknown (3/25/2025)    Financial Resource Strain     Within the past 12 months, have you or your family members you live with been unable to get utilities (heat, electricity) when it was really needed?: Patient unable to answer   Alcohol Use: Not on file   Transportation Needs: Unknown (3/25/2025)    Transportation Needs     Within the past 12 months, has lack of transportation kept you from medical appointments, getting your medicines, non-medical meetings or appointments, work, or from getting things that you need?: Patient unable to answer   Physical Activity: Not on file   Interpersonal Safety: Unknown (3/25/2025)    Interpersonal Safety     Do you feel physically and emotionally safe where you currently live?: Patient unable to answer     Within the past 12 months, have you been hit, slapped, kicked or otherwise physically hurt by someone?: Patient unable to answer     Within the past 12 months, have you been humiliated or emotionally abused in other ways by your partner or ex-partner?: Patient unable to answer   Stress: Not on file   Social  Connections: Not on file   Health Literacy: Not on file       Functional Status:  Prior to admission patient needed assistance:   Dependent ADLs:: Ambulation-walker, Bathing, Dressing, Grooming, Incontinence, Positioning, Transfers, Wheelchair-with assist, Toileting  Dependent IADLs:: Cleaning, Cooking, Laundry, Shopping, Meal Preparation, Medication Management, Money Management, Transportation, Incontinence  Assesssment of Functional Status: At functional baseline    Mental Health Status:  Mental Health Status: No Current Concerns       Chemical Dependency Status:  Chemical Dependency Status: No Current Concerns             Values/Beliefs:  Spiritual, Cultural Beliefs, Tenriism Practices, Values that affect care: no               Discussed  Partnership in Safe Discharge Planning  document with patient/family: Yes:     Additional Information:  SW met with pt and dtr Vishal at bedside for completion of consult, discharge planning. Pt lives in a senior apartment with an elevator and has no stairs to navigate. Pt dtr assists with I/ADL's regularly and pt is noted to be covered with elderly waiver for support and has [facility] HH active prior to hospitalization. Pt gets transportation through her health plan and would need w/c ride set up at discharge. Pt [facility] coordinaton [place] [phone] is available if any discharge or support needs arise.    Next Steps:     [] Place HH resumption referral for [facility]--RN/PT/OT/KINZA  [] D-C Ride: w/c transport need to be scheduled  [] Internal hand off vs external hand off - Ronnie Kellogg    Medical SW--Inpatient Care Management  [facility]                        MONICA Whaley

## 2025-03-26 NOTE — PROVIDER NOTIFICATION
Purpose of Notification: hypertension  Notified Person: MD on call  Notification Time: 2057  Notification Interaction: rafaela    /87 laying flat in bed, denies pain    Provider ordered oral carvedilol

## 2025-03-26 NOTE — PROGRESS NOTES
ED PT Evaluation:      03/25/25 3495   Appointment Info   Signing Clinician's Name / Credentials (PT) Geovany Garcia, PT, DPT   Rehab Comments (PT) No using left wrist and hand; Procare wrist brace on at all times   Living Environment   People in Home child(tiffany), adult   Current Living Arrangements apartment   Home Accessibility no concerns   Transportation Anticipated family or friend will provide   Living Environment Comments Lives with daughter who is one of their PCAs   Self-Care   Equipment Currently Used at Home walker, standard   Fall history within last six months yes   Number of times patient has fallen within last six months 15  (10-15)   Activity/Exercise/Self-Care Comment Ambulates with FWW in their apt, manual wc for longer distances. Has 24/7 A from daughter and another PCA. Has HH PT/OT/RN. Daughter reports increased fall frequency lately and pt has been needing increased assist with ADLs from baseline.   General Information   Onset of Illness/Injury or Date of Surgery 03/23/25   Referring Physician Colton Mata MD   Patient/Family Therapy Goals Statement (PT) Return home   Pertinent History of Current Problem (include personal factors and/or comorbidities that impact the POC) Per EMR: Isabell Matthews is a 64 year old female admitted on 3/23/2025. She has a notable history of frequent falls, recurrent UTI, vascular dementia, CVA (most recent 06/2024), HTN, HLD, CKD IIIb, T1DM c/b neuropathy, and seizures. Was admitted for dizziness and falls, found to have L wrist fx. Remains admitted for further workup and surgical consultation.   Weight-Bearing Status - LLE nonweight-bearing   General Observations patient supine with daughter at bedside.   Cognition   Affect/Mental Status (Cognition) flat/blunted affect   Cognitive Status Comments follows commands with delay; daughter reports baseline   Integumentary/Edema   Integumentary/Edema Comments Left periorbital swelling and ecchymosis   Posture    Posture  Forward head position;Protracted shoulders   Range of Motion (ROM)   ROM Comment L wrist/hand immobilized with brace   Strength (Manual Muscle Testing)   Strength (Manual Muscle Testing) Deficits observed during functional mobility   Strength Comments Grossly deconditioned   Bed Mobility   Bed Mobility supine-sit   Supine-Sit Arpin (Bed Mobility) moderate assist (50% patient effort);verbal cues   Transfers   Transfers sit-stand transfer   Sit-Stand Transfer   Sit-Stand Arpin (Transfers) moderate assist (50% patient effort);2 person assist   Gait/Stairs (Locomotion)   Arpin Level (Gait) moderate assist (50% patient effort)   Comment, (Gait/Stairs) B arm-under-arm   Balance   Balance Comments min-modA static stance with R HHA   Clinical Impression   Criteria for Skilled Therapeutic Intervention Yes, treatment indicated   PT Diagnosis (PT) impaired functional mobility   Influenced by the following impairments generalized weakness/deconditioning, pain, LUE NWB, baseline cognition   Functional limitations due to impairments bed mobility, transfers, gait, balance, activity tolerance, self cares   Clinical Presentation (PT Evaluation Complexity) stable   Clinical Presentation Rationale Clinical judgment   Clinical Decision Making (Complexity) low complexity   Planned Therapy Interventions (PT) balance training;bed mobility training;gait training;home exercise program;patient/family education;orthotic fitting/training;strengthening;transfer training;progressive activity/exercise;risk factor education;home program guidelines;wheelchair management/propulsion training   Risk & Benefits of therapy have been explained evaluation/treatment results reviewed;care plan/treatment goals reviewed;risks/benefits reviewed;current/potential barriers reviewed;participants voiced agreement with care plan;participants included;patient;daughter   PT Total Evaluation Time   PT Eval, Low Complexity Minutes (43868) 6    Physical Therapy Goals   PT Frequency 5x/week   PT Predicted Duration/Target Date for Goal Attainment 04/09/25   PT Goals Bed Mobility;Transfers;Gait   PT: Bed Mobility Minimal assist;Supine to/from sit;Within precautions   PT: Transfers Modified independent;Sit to/from stand;Bed to/from chair;Assistive device;Within precautions   PT: Gait Minimal assist;Assistive device;25 feet;Within precautions   PT Discharge Planning   PT Plan family traning for transfers and gait, watch OH   PT Discharge Recommendation (DC Rec) home with assist;home with home care physical therapy   PT Rationale for DC Rec Limited evaluation due to orthostatic hypotension. Has excellent support and all DME needs met at home. Anticipate with improved vitals and family training patient will be able to return home with assist from PCAs and resumption of HH services.   PT Brief overview of current status Ax1-2 bed mobility and pivot transfers   PT Total Distance Amb During Session (feet) 5   Physical Therapy Time and Intention   Total Session Time (sum of timed and untimed services) 6

## 2025-03-26 NOTE — PHARMACY-ADMISSION MEDICATION HISTORY
Pharmacy Intern Admission Medication History    Admission medication history is complete. The information provided in this note is only as accurate as the sources available at the time of the update.    Information Source(s):  Family member via phone  Dispense report    Pertinent Information:  The patient has not been on amlodipine 5 mg for over 3 months due to stable blood pressure. She monitors her blood pressure at home three times a week, and it has been stable.  Have not started chlorhexidine (PERIDEX) 0.12 % solution (dispensed 3/23/25)  Glucagon (GVOKE HYPOPEN) 1 MG/0.2ML pen was once used about 2 weeks ago.  SYNTHROID 75 MCG tablet: Brand only    Changes made to PTA medication list:  Added: None  Deleted: confirmed per patient's family member  Duplicated: blood glucose (NO BRAND SPECIFIED) test strip  Duplicated: blood glucose monitoring (NO BRAND SPECIFIED) meter device kit  Patient requested: Insulin aspart (NOVOLOG PENFILL) 100 UNIT/ML cartridge    Changed:   insulin aspart (NOVOLOG FLEXPEN) 100 UNIT/ML pen   Sig: For Pre-Meal -400 give 5 additional units Max 25 units daily --> Max 20 units daily  insulin glargine (LANTUS PEN) 100 UNIT/ML pen  Si units --> 20 units    Medication History Completed By: Cristine Tirado 3/26/2025 4:04 PM    PTA Med List   Medication Sig Last Dose/Taking    acetaminophen (TYLENOL) 325 MG tablet Take 3 tablets (975 mg) by mouth every 8 hours. 3/21/2025    amLODIPine (NORVASC) 5 MG tablet Take 1 tablet (5 mg) by mouth daily. More than a month    aspirin (ASA) 81 MG chewable tablet Take 1 tablet (81 mg) by mouth daily 3/22/2025 at  8:00 AM    atorvastatin (LIPITOR) 40 MG tablet Take 1 tablet (40 mg) by mouth daily. 3/22/2025 at 11:00 AM    blood glucose (NO BRAND SPECIFIED) test strip Use to test blood sugar 4 times daily or as directed. Taking    blood glucose monitoring (ONE TOUCH ULTRA MINI) meter device kit Use to test blood sugar 4 times daily or as directed. Taking     carvedilol (COREG) 12.5 MG tablet Take 1 tablet (12.5 mg) by mouth 2 times daily (with meals). 3/22/2025 at 11:00 PM    chlorhexidine (PERIDEX) 0.12 % solution Take 30 mLs by mouth daily. Swish and spit 30 mL once daily for 14 days.Swish and spit 30 mL once daily for 14 days. Taking    Continuous Glucose Sensor (FREESTYLE MARIA FERNANDA 2 SENSOR) MISC Change every 14 days. Taking    cyanocobalamin (VITAMIN B-12) 1000 MCG tablet Take 1 tablet (1,000 mcg) by mouth daily 3/22/2025 at  8:00 AM    DULoxetine (CYMBALTA) 20 MG capsule TAKE 1 CAPSULE(20 MG) BY MOUTH DAILY 3/22/2025 at 11:00 PM    gabapentin (NEURONTIN) 100 MG capsule Take 1 capsule (100 mg) by mouth at bedtime. 3/22/2025 at 11:00 PM    Glucagon (GVOKE HYPOPEN) 1 MG/0.2ML pen Inject the contents of 1 device under the skin into lower abdomen, outer thigh, or outer upper arm as needed for hypoglycemia. If no response after 15 minutes, additional 1 mg dose from a new device may be injected while waiting for emergency assistance. Past Month    insulin aspart (NOVOLOG FLEXPEN) 100 UNIT/ML pen Inject 2 units with meals plus correction scale additional three times daily before meals  as follows  Pre-Meal  - 200 give additional 1 unit.  For Pre-Meal  -250 give 2 additional units.  For Pre-Meal -300 give 3 additional units.  For Pre-Meal -350 give 4 additional units.  For Pre-Meal -400 give 5 additional units Max 25 units daily (Patient taking differently: Inject 2 units with meals plus correction scale additional three times daily before meals  as follows  Pre-Meal  - 200 give additional 1 unit.  For Pre-Meal  -250 give 2 additional units.  For Pre-Meal -300 give 3 additional units.  For Pre-Meal -350 give 4 additional units.  For Pre-Meal -400 give 5 additional units Max 20 units daily.) 3/22/2025 Evening    insulin glargine (LANTUS PEN) 100 UNIT/ML pen Inject 19 Units subcutaneously at bedtime. (Patient taking  differently: Inject 20 Units subcutaneously at bedtime.) 3/22/2025 at 11:00 PM    levETIRAcetam (KEPPRA) 500 MG tablet Take 1 tablet (500 mg) by mouth 2 times daily. 3/22/2025 Evening    loratadine (CLARITIN) 10 MG tablet Take 1 tablet (10 mg) by mouth daily. 3/22/2025 Evening    methocarbamol (ROBAXIN) 500 MG tablet Take 1 tablet (500 mg) by mouth daily as needed for muscle spasms. Past Month    PENTIPS 32G X 4 MM miscellaneous Inject 1 each subcutaneously daily. Use 4 pen needles daily or as directed. Taking    SYNTHROID 75 MCG tablet Take 1 tablet (75 mcg) by mouth daily. 3/23/2025 Morning    zinc oxide (DESITIN) 20 % external ointment Apply topically as needed for dry skin or irritation More than a month

## 2025-03-26 NOTE — PLAN OF CARE
"Plan of Care Reviewed With: patient    Overall Patient Progress: no change    Outcome Evaluation: AO x1 (self). OT consulted - family education provided. Brusing scattered. Abd binder/compression stockings placed. Orthostatics positive, otherwise VSS - RA.    RN [id number]    Vitals: Afebrile. VSS on RA (except orthostatics positive, high BP).   Neuro: A&Ox 1 (self). Generalized weakness/mildly impaired. Moderate strength.   Mobility: Pt moves A x2 (gait/walker). Bed-alarm in place for safety, call light in reach.  Pain/Nausea: Pain managed with scheduled meds, PRN tylenol. Denies nausea.   Diet: Tolerating regular diet with good appetite. Carb coverage started.   Labs: Reviewed. BG ACHS.   LDAs: R PIV x2 - SL.   Skin/incisions: Scattered bruising from fall. L eye swelling/bruising.   Respiratory: No acute changes this shift. Lung sounds clear, no SOB noted.   Cardiac: No acute changes this shift.  /GI: Voiding using purewick - good output. LBM today; passing flatus.    NEW CHANGES: OT consulted - provided education with family. Endocrine diabetes consulted. Positive orthostatics - compressions, abd binder placed. Tylenol given.     Continue to monitor patient s pain, skin, BG and intervene as needed. Continue to implement Plan of Care. Notify MD with any concerns or changes in patient status.     Vital signs:  Temp: 97.6  F (36.4  C) Temp src: Oral BP: 124/76 Pulse: 101   Resp: 18 SpO2: 99 % O2 Device: None (Room air)   Height: 160 cm (5' 2.99\") Weight: 63.8 kg (140 lb 10.5 oz)  Estimated body mass index is 24.92 kg/m  as calculated from the following:    Height as of this encounter: 1.6 m (5' 2.99\").    Weight as of this encounter: 63.8 kg (140 lb 10.5 oz).    Suma Barkley RN    "

## 2025-03-26 NOTE — PLAN OF CARE
"  Problem: Adult Inpatient Plan of Care  Goal: Plan of Care Review  Description: The Plan of Care Review/Shift note should be completed every shift.  The Outcome Evaluation is a brief statement about your assessment that the patient is improving, declining, or no change.  This information will be displayed automatically on your shiftnote.  Outcome: Progressing  Flowsheets (Taken 3/26/2025 1125)  Outcome Evaluation: clinical improvement, discharge home with family and HH  Plan of Care Reviewed With:   patient   child   caregiver  Overall Patient Progress: improving  Goal: Patient-Specific Goal (Individualized)  Description: You can add care plan individualizations to a care plan. Examples of Individualization might be:  \"Parent requests to be called daily at 9am for status\", \"I have a hard time hearing out of my right ear\", or \"Do not touch me to wake me up as it startlesme\".  Outcome: Progressing  Goal: Absence of Hospital-Acquired Illness or Injury  Outcome: Progressing  Goal: Optimal Comfort and Wellbeing  Outcome: Progressing  Goal: Readiness for Transition of Care  Outcome: Progressing  Flowsheets (Taken 3/26/2025 1100)  Concerns to be Addressed: discharge planning  Intervention: Mutually Develop Transition Plan  Recent Flowsheet Documentation  Taken 3/26/2025 1100 by Sean Garza LSW  Readmission Within the Last 30 Days: no previous admission in last 30 days  Transportation Concerns: none  Concerns to be Addressed: discharge planning  Patient/Family Anticipated Services at Transition: home health care  Patient/Family Anticipates Transition to: home with family  Offered/Gave Vendor List: yes  Equipment Currently Used at Home:   walker, standard   wheelchair, manual     "

## 2025-03-26 NOTE — PATIENT INSTRUCTIONS
"Recommendations from today's MTM visit:                                                    MTM (medication therapy management) is a service provided by a clinical pharmacist designed to help you get the most of out of your medicines.   Today we reviewed what your medicines are for, how to know if they are working, that your medicines are safe and how to make your medicine regimen as easy as possible.      I will confirm that [employer]za sensors are covered by insurance. If covered, I recommend trying again with over patches that help keep the sensor in place.  I sent an order for a new One Touch Verio Reflect meter.  Try using diclofenac (Voltaren) gel as needed for pain.  I will let Dr. Kellogg know you are interested in trying a lower dose of levetiracetam.    Follow-up: with MTM as needed    It was great speaking with you today.  I value your experience and would be very thankful for your time in providing feedback in our clinic survey. In the next few days, you may receive an email or text message from [employer] with a link to a survey related to your  clinical pharmacist.\"     To schedule another MTM appointment, please call the clinic directly or you may call the MTM scheduling line at [phone] or toll-free at [phone].     My Clinical Pharmacist's contact information:                                                      Please feel free to contact me with any questions or concerns you have.      Rohini Villa (Hailie), PharmD, MPH  Medication Therapy Management Pharmacist    "

## 2025-03-26 NOTE — CONSULTS
"  Inpatient Diabetes Management Service : New Consult Note     Patient: Isabell Matthews   YOB: 1960    MRN: [id number]     Date of Consult : 03/26/2025   Date of Admission: 3/23/2025     Reason for Consult: \"65 yo F with poorly controlled DM1\"   Consult Requestor: Venus Finney MD            History of Present Illness:   Isabell Matthews is a 64 year old female admitted on 3/23/2025. She has a notable history of frequent falls, recurrent UTI, vascular dementia, CVA (most recent 06/2024), HTN, HLD, CKD IIIb, T1DM c/b neuropathy, and seizures. Was admitted for dizziness and falls, found to have L wrist fx. Remains admitted for further workup and surgical consultation. Inpatient Diabetes Service consulted for glycemicmangement.     History obtained via the patient, chart review and discussion with primary team.       Additional Historian:  daughter, Maria Guadalupe    Patient is known to the Inpatient Diabetes Service from past admission(s).    Current inpatient regimen:    - Lantus 20 units once daily at HS  - Novolog correction 1:25>140 TID AC, >200 HS    BG at time of consult: 364  Planned Procedures/Surgeries: none known     Relevant Labs on Admission:  if contributory, otherwise see labs below   Latest Reference Range & Units 03/23/25 10:17   Sodium 135 - 145 mmol/L 140   Potassium 3.4 - 5.3 mmol/L 4.9   Chloride 98 - 107 mmol/L 106   Carbon Dioxide (CO2) 22 - 29 mmol/L 20 (L)   Urea Nitrogen 8.0 - 23.0 mg/dL 45.0 (H)   Creatinine 0.51 - 0.95 mg/dL 1.54 (H)   GFR Estimate >60 mL/min/1.73m2 37 (L)   Calcium 8.8 - 10.4 mg/dL 9.3   Anion Gap 7 - 15 mmol/L 14   Albumin 3.5 - 5.2 g/dL 3.7   Protein Total 6.4 - 8.3 g/dL 6.4   Alkaline Phosphatase 40 - 150 U/L 144   ALT 0 - 50 U/L 28   AST 0 - 45 U/L 40   Bilirubin Total <=1.2 mg/dL 0.2   Glucose 70 - 99 mg/dL 345 (H)   Estimated Average Glucose <117 mg/dL 206 (H)   Hemoglobin A1C <5.7 % 8.8 (H)   Troponin T, High Sensitivity <=14 ng/L 49 (H)     Inpatient Glucose " Trends:           Diabetes History:   Diabetes Type and Duration: DM for > 40 years, diagnosed in her 40s. Per daughter,  initially told she has T2DM then T1DM for a while then flipped back to being told T2DM.      6/17/2024 positive GAD65 antibody and c-peptide <0.1 but BG elevated    PTA Medication Regimen:   - Lantus 20 units at night   - Novolog 2 units before meals   - Novolog correction 1:50>150 TID AC  Missing doses?: may not get fixed meal dose at day program, may skip meal dose if BG <150 mg/dL  Historical Diabetes Medications: insulins    Glucose monitoring device/frequency/trends: glucometer before meals and at bedtime. Has tried and failed multiple CGMs (issues with accuracy or patient picking CGM off).   Hypoglycemia PTA:   - Frequency: 2x per month (in the morning)  - Severity: + history of severe unconscious lows   - Awareness: variable; will go make    - Treatment: 15g cho     Outpatient Diabetes Provider: PCP - Bony Castaneda   Formal Diabetes Education/Educator: not asked    ------------------------  Complications:  + peripheral neuropathy on gabapentin, no known retinopathy (last eye exam: >4 years; next scheduled 4/1/25), + nephropathy (CKD), denies gastroparesis, + macrovascular disease (hx of MI and multiple strokes)     Most recent A1c: 8.8% 3/23/25    Hemoglobin at time of most recent A1c: 10.5  RBC transfusion 3 months prior to most recent A1c: none      History of DKA: yes: 2011, 2020 and 2022     Safety Plan:   - Glucagon: + Gvoke (last used in the past month)   - Ketone Strips: not asked    Diet/Lifestyle/Living Situation: 3 meals a day, daughter cooks meals for her. Sometimes her daughter has to sit with her and make sure she eats now; tends to take a little while to finish her meals.  Ability to [place] Prescribed Regimen:  Daughter doses and injects   Food/Housing Insecurity: no concerns          Medications Impacting Glycemia:    Steroids: none  D5W containing  "solutions/medications: none  Other medications impacting glucose: none         Diet/Nutrition:    Orders Placed This Encounter      Regular Diet Adult     Supplements:  none  TF: none  TPN: none          Review of Systems:    Constitutional: denies fever and chills.   HEENT: denies changes in vision or changes in hearing  Respiratory: denies SOB  GI: denies changes in appetite.   MSK/Integumentary: +left arm fracture         Past Medical History:       Past Medical History:   Diagnosis Date    Chronic pain     Coronary atherosclerosis 2016    Essential hypertension     Ex-cigarette smoker     quit 2018 over 35 years    Ex-cigarette smoker     Hyperlipidemia     Hypothyroid     Seizures (H)     Stroke (H)     Vascular dementia (H)              Past Surgical History:      Past Surgical History:   Procedure Laterality Date    athroplasty hip Bilateral     ,     OTHER SURGICAL HISTORY      athroplasty hip    PICC DOUBLE LUMEN PLACEMENT Right 2023    right basilic 5 fr dl power picc 39 cm    STENT               Social History:      Social History     Tobacco Use    Smoking status: Former     Current packs/day: 0.00     Average packs/day: 0.5 packs/day for 35.0 years (17.5 ttl pk-yrs)     Types: Cigarettes     Start date: 1983     Quit date: 2018     Years since quittin.7    Smokeless tobacco: Never   Substance Use Topics    Alcohol use: Not Currently        History   Sexual Activity    Sexual activity: Not Currently             Family History:      Family History   Problem Relation Age of Onset    Acute Myocardial Infarction Father     Heart Disease Maternal Grandmother     Dementia Maternal Grandmother     Myocardial Infarction Father     Dementia Paternal Grandmother     Heart Disease Paternal Grandmother              Physical Exam:    BP (!) 155/79 (BP Location: Right arm)   Pulse 97   Temp 97.9  F (36.6  C) (Oral)   Resp 18   Ht 1.6 m (5' 2.99\")   Wt 63.8 kg (140 lb 10.5 oz)   " SpO2 96%   BMI 24.92 kg/m     General:  well appearing, in no acute distress.  HEENT: sclera not injected  Lungs:  no SOB  Skin:  bruises on face  MSK:   L arm in brace  Mental Status:  Alert   Psych:   Cooperative, good eye contact         Laboratory Data:      Lab Results   Component Value Date    A1C 8.8 (H) 03/23/2025    A1C 9.6 (H) 01/16/2025    A1C 11.2 (H) 09/23/2024    A1C 10.2 (H) 05/20/2024    A1C 10.7 (H) 01/29/2024    A1C 7.8 (H) 06/21/2021    A1C 11.7 (H) 09/27/2020     Recent Labs   Lab Test 03/26/25  0846   WBC 5.6   RBC 4.12   HGB 11.3*   HCT 35.5   MCV 86   MCH 27.4   MCHC 31.8   RDW 14.4        Recent Labs   Lab Test 03/26/25  1200 03/26/25  0851 03/26/25  0846 03/25/25  1249 03/25/25  1118   NA  --   --  135  --  139   POTASSIUM  --   --  4.3  --  4.3   CHLORIDE  --   --  100  --  105   CO2  --   --  24  --  22   ANIONGAP  --   --  11  --  12   * 364* 392*   < > 212*   BUN  --   --  27.6*  --  26.0*   CR  --   --  1.52*  --  1.42*   VERONICA  --   --  9.2  --  9.2    < > = values in this interval not displayed.     Recent Labs   Lab Test 03/26/25  0846   PROTTOTAL 6.5   ALBUMIN 3.7   BILITOTAL 0.3   ALKPHOS 160*   AST 28   ALT 26            Assessment and Recommendations:       Assessment:   Type 1 versus Type 2  Diabetes Mellitus with DEMETRIA elevated at 6.2 and C-peptide=<0.1 on 6/17/2024, complicated by peripheral neuropathy, nephropathy, hx of DKA and hypoglycemia. Sub-optimal control (A1c 8.8 % 3/23/25, Hgb: 10.5)  Dementia - daughter is caregiver and manages diabetes for patient.   Stress hyperglycemia    Plan/Recommendations:    - Lantus 18 units q 24 hrs at 2200   - Novolog Meal Coverage: 1 units per 20 g CHO, TID AC and PRN with snacks/supplements   - Novolog Correction Scale: 1:50>140 TID AC, >200 HS, 0200 (medium resistance)  - BG monitoring: TID AC, HS, 0200  - Hypoglycemia protocol    - Carb counting protocol     Discussion:    Variable BG this admission on the higher side of  goal. No carb coverage ordered. Suspect hyperglycemia multifactorial due to stress and lack of carb coverage. Recommend starting carb coverage at this time. Also recommend reducing anticipated lantus dose to less than PTA. Will also reduce correction insulin with addition of carb coverage and increase in Lantus tonight. Called daughter to gather history and discuss plan.      Per ADA guidelines, treatment goals and recommendations for older adults with DM and medically complexity is less stringent BG control/A1c for safety - targets of 110-180 mg/dL and up to 250 mg/dL reasonable.  Avoid reliance on A1c.  Glucose decisions should be based on avoidance of hypoglycemia and symptomatic hyperglycemia.      Discharge Planning: (tentative)    Medications: TBD. Likely adjustment of PTA doses. Needs set doses for day program, would be able to use ICR at home.   Test Claims: none needed.   Education: Needs to be assessed closer to discharge.    Outpatient Follow-up: recommend [facility] vs PCP       Thank you for this consult. IDS will continue to follow.      Please notify Inpatient Diabetes Service if changes are planned to steroids, nutrition, TPN/TF and anticipated procedures requiring prolonged NPO status.     Mirela Anand PA-C  Inpatient Diabetes Service  Available on [employer] or Secure Chat     To contact Inpatient Diabetes Service:     7 AM - 5 PM: Page the IDS [place] following the patient that day (see filed or incomplete progress notes/consult notes under Endocrinology)    OR if uncertain of provider assignment: page job code [phone]    5 PM - 7 AM: First call after hours is to primary service.    For urgent after-hours questions, page job code for on call fellow: [phone]     I spent a total of 80 minutes on the date of the encounter doing prep/post-work, chart review, history and exam, documentation and further activities per the note including lab review, multidisciplinary communication, counseling the  patient and/or coordinating care regarding acute hyper/hypoglycemic management, as well as discharge management and planning/communication.  See note for details.

## 2025-03-26 NOTE — PROGRESS NOTES
03/26/25 1100   Appointment Info   Signing Clinician's Name / Credentials (OT) Renu Wolff OTR/L   Rehab Comments (OT) Family education only so daughter needs to be present for sessions, NWB L UE, dementia at baseline   Living Environment   People in Home child(tiffany), adult   Current Living Arrangements apartment   Home Accessibility no concerns   Transportation Anticipated family or friend will provide   Living Environment Comments Lives with daughter who is one of their PCAs.   Self-Care   Usual Activity Tolerance fair   Current Activity Tolerance poor   Regular Exercise Yes   Activity/Exercise Type walking   Exercise Amount/Frequency daily;45 mins   Equipment Currently Used at Home walker, standard;wheelchair, manual   Fall history within last six months yes   Number of times patient has fallen within last six months 15  (10-15)   Activity/Exercise/Self-Care Comment Ambulates with FWW in their apt, manual wc for longer distances. Has 24/7 A from daughter and another PCA. Has HH PT/OT/RN. Daughter reports increased fall frequency lately and pt has been needing increased assist with ADLs from baseline. Has walk in shower with no lip and uses shower chair. Daughter assists with most ADLs including dressing, bathing, grooming, and toileting. Typically requires cueing and Min-Mod assist with ADLs. Total A for showering tasks.   Instrumental Activities of Daily Living (IADL)   IADL Comments Pt is dependent for all IADLs at this time.   General Information   Onset of Illness/Injury or Date of Surgery 03/25/25   Referring Physician Colton Mata MD   Patient/Family Therapy Goal Statement (OT) To go home safely per daughter   Additional Occupational Profile Info/Pertinent History of Current Problem Isabell Matthews is a 64 year old female admitted on 3/23/2025. She has a notable history of frequent falls, recurrent UTI, vascular dementia, CVA (most recent 06/2024), HTN, HLD, CKD IIIb, T1DM c/b neuropathy, and  seizures. Was admitted for dizziness and falls, found to have L wrist fx. Remains admitted for further workup and surgical consultation.   Existing Precautions/Restrictions fall   Left Upper Extremity (Weight-bearing Status) non weight-bearing (NWB)   Right Upper Extremity (Weight-bearing Status) full weight-bearing (FWB)   Left Lower Extremity (Weight-bearing Status) full weight-bearing (FWB)   Right Lower Extremity (Weight-bearing Status) full weight-bearing (FWB)   General Observations and Info Up with assist   Cognitive Status Examination   Orientation Status person   Cognitive Status Comments Pt with history of dementia at baseline. Decreased safety awareness and STM per daughter.   Visual Perception   Visual Impairment/Limitations WFL   Sensory   Sensory Quick Adds sensation intact   Pain Assessment   Patient Currently in Pain No   Posture   Posture forward head position   Range of Motion Comprehensive   General Range of Motion bilateral upper extremity ROM WFL   Strength Comprehensive (MMT)   General Manual Muscle Testing (MMT) Assessment upper extremity strength deficits identified   Comment, General Manual Muscle Testing (MMT) Assessment Pt presents with generalized deconditioning. NWB on L UE.   Upper Extremity (Manual Muscle Testing)   Upper Extremity: Manual Muscle Testing (MMT) right UE strength is [facility]   Muscle Tone Assessment   Muscle Tone Quick Adds No deficits were identified   Transfers   Transfers bed-chair transfer;sit-stand transfer;toilet transfer;shower transfer   Transfer Skill: Bed to Chair/Chair to Bed   Bed-Chair [place] (Transfers) moderate assist (50% patient effort)   Sit-Stand Transfer   Sit-Stand [place] (Transfers) moderate assist (50% patient effort)   Shower Transfer   [place] Level (Shower Transfer) moderate assist (50% patient effort)   Shower Transfer Comments Per clinical judgment   Toilet Transfer   [place] Level (Toilet Transfer) moderate assist (50%  patient effort)   Toilet Transfer Comments Per clinical judgment   Activities of Daily Living   BADL Assessment/Intervention bathing;lower body dressing;grooming;toileting   Bathing Assessment/Intervention   [place] Level (Bathing) dependent (less than 25% patient effort)   Lower Body Dressing Assessment/Training   [place] Level (Lower Body Dressing) dependent (less than 25% patient effort)   Grooming Assessment/Training   [place] Level (Grooming) maximum assist (25% patient effort)   Comment, (Grooming) Per clinical judgment and daughter report   Toileting   Comment, (Toileting) Per clinical judgment and daughter report   [place] Level (Toileting) maximum assist (25% patient effort)   Clinical Impression   Criteria for Skilled Therapeutic Interventions Met (OT) Yes, treatment indicated   OT Diagnosis Impaired I/ADLs   OT Problem List-Impairments impacting ADL problems related to;activity tolerance impaired;cognition;balance;mobility;inability to direct their own care;range of motion (ROM);strength;postural control;pain   Assessment of Occupational Performance 1-3 Performance Deficits   Identified Performance Deficits functional mobility, functional transfers   Planned Therapy Interventions (OT) ADL retraining;strengthening;transfer training;home program guidelines;risk factor education;progressive activity/exercise   Clinical Decision Making Complexity (OT) problem focused assessment/low complexity   Risk & Benefits of therapy have been explained evaluation/treatment results reviewed;care plan/treatment goals reviewed;risks/benefits reviewed;current/potential barriers reviewed;participants voiced agreement with care plan;participants included;patient;daughter   OT Total Evaluation Time   OT Eval, Low Complexity Minutes ([id number]) 5   OT Goals   Therapy Frequency (OT) 6 times/week   OT Predicted Duration/Target Date for Goal Attainment 04/02/25   OT Goals OT Goal 2;OT Goal 1;OT Goal 3   OT: Goal 1  Pt will complete bed <> w/c tx with Min Ax1 with daughter by discharge.   OT: Goal 2 Pt and daughter will complete walk in shower tx from w/c to shower chair with Min A by discharge.   OT: Goal 3 Daughter will demonstrate understanding of safe ADL transfers (toilet, shower) by discharge with Min A to minimize caregiver physical burden.   Interventions   Interventions Quick Adds Therapeutic Activity   OT Discharge Planning   OT Plan Caregiver education with toilet tx, shower tx, additional problem solving with ADL in home setting to reduce caregiver burden   OT Discharge Recommendation (DC Rec) home with assist;home with home care occupational therapy   OT Rationale for DC Rec Pt presenting below PLOF primarily limited by NWB status on L UE. Pt is safe to d/c to home setting with daughter assist/PCA 24/7. Pt will be safest to mobilize with rented wheelchair in home setting with SPT.   OT Brief overview of current status Mod Ax1-2 for SPT from bed <> chair   OT Total Distance Amb During Session (feet) 5

## 2025-03-26 NOTE — PROGRESS NOTES
[facility], [place]    Progress Note - Medicine Service, MAROON TEAM 2       Date of Admission:  3/23/2025    Assessment & Plan   Isabell Matthews is a 64 year old female admitted on 3/23/2025. She has a notable history of frequent falls, recurrent UTI, vascular dementia, CVA (most recent 2024), HTN, HLD, CKD IIIb, T1DM c/b neuropathy, and seizures. Was admitted for dizziness and falls, found to have L wrist fx. Remains admitted for further workup and surgical consultation.     Today's Updates:  - Orthostatic blood pressure readings were as followed:  Lyin/81  Sittin/76, 136/79 recheck  Standin/74  - Talked to Nursing about using abdominal binder and compression socks. Reschedule an orthostatic BP recheck tomorrow morning.  - Continued confusion, oriented to self. Not to time or place.  - Continue CTX   - Starting diclofenac gel and Tylenol for pain management  - PT recommend discharge to home with family training for transfers and gait. At home care includes assistance with PT/OT/nursing services.  - Pending OT evaluation   - Endo consult         AMS?  Dizziness  Recurrent falls  Presenting after an unwitnessed fall morning of 3/23. Was found on floor by pt's daughter noting pain in L side and bleeding from face. Unclear what pt's baseline cognitive status is. Currently is alert and somewhat oriented to place but not to time or self. This appears different from Neuro exam noted at Neurology visit in 2024 but on discussion with pt's daughter over the phone she reports that Isabell's mentation is unchanged. Has slowed speech but is able to answer questions appropriately and follow commands. Non-con CT head without acute intracranial pathology or fracture. No focal neuro deficits. CXR without infectious signs. EKG sinus rhythm without ST or T wave changes. Normal carotid vasculature in 2024. Dizziness may be 2/2 infection, medication s/e, orthostatic  hypotension, progression of vascular disease, or arrhythmia.  - Orthostatic BP measurement  - OT/PT evaluation  - Bcx obtained, no growth to date, will continue to follow (obtained after CTX was given)  - Telemetry was not tolerated by patient   - Discussed possible higher level of care, daughter is working with  for possible options  - Discussed possible Neuro outpatient on discharge, daughter reports following with Neuro     L wrist fx  L periorbital Hematoma  2/2 fall as above. Imaging in ED showing acute, comminuted, and impacted fracture of L distal radius. No facial fracture.   - Ortho determined that surgery was not indicated, recommended wearing the brace continuously and following up in a week (3/31)  - Multimodal pain management    Bacturia with possible UTI  Pt has a history of recurrent UTIs. Most recently pan-sensitive E.coli in January 2025. Does not report any sx on admission but UA positive for nitrites and some bacteria. No LE or WBC. S/p 1 dose CTX in ED.   - Ucx pending  - Continue CTX iso AMS, falls, and possible UTI    Hypothyroidism  Pt is on 75 mcg levothyroxine daily. Low T4 (0.67) with high TSH (37.30) on admission.   - Increase to 100 mcg levothyroxine daily     DM1  A1c of 9.6% on January 2025. BS in 300s on admission.   - A1c ordered  - Home regimen:  LDSSI  Lantus 20u at bedtime  2u mealtime with additional correction scale   - In hospital:  Hypoglycemia protocol  Start 20u Lantus and MDSSI  - Endo consult     CKD3b  Baseline Cr of 1.5. Cr on admission of 1.5.   - CTM    HTN  HLD  BP on admission of 203, 140 on repeat after treatment of pain.   - Continue Atorvastatin 40mg  - Holding Amlodipine 5mg  - Holding Carvedilol 12.5mg BID    Prior CVAs (ischemic and hemorrhagic)  Vascular dementia  No focal deficits on exam today.   - Holding ASA pending surgical plan  - Recommend getting MOCA once pt is past possible acute infection/recrudesence period and closer to  discharge    H/o seizures  Reported history of seizures iso DKA and strokes. None reported in last 4 years  - Continue Keppra 500mg BID      Diet: Regular Diet Adult    DVT Prophylaxis: Pneumatic Compression Devices  Parker Catheter: Not present  Fluids: PO  Lines: None     Cardiac Monitoring: None  Code Status: No CPR- Do NOT Intubate      Clinically Significant Risk Factors                   # Hypertension: Noted on problem list     # Dementia: noted on problem list            # Financial/Environmental Concerns:           Social Drivers of Health   Food Insecurity: Unknown (3/25/2025)    Food Insecurity     Within the past 12 months, did you worry that your food would run out before you got money to buy more?: Patient unable to answer     Within the past 12 months, did the food you bought just not last and you didn t have money to get more?: Patient unable to answer   Housing Stability: Unknown (3/25/2025)    Housing Stability     Do you have housing? : Patient unable to answer     Are you worried about losing your housing?: Patient unable to answer   Tobacco Use: Medium Risk (3/23/2025)    Patient History     Smoking Tobacco Use: Former     Smokeless Tobacco Use: Never   Financial Resource Strain: Unknown (3/25/2025)    Financial Resource Strain     Within the past 12 months, have you or your family members you live with been unable to get utilities (heat, electricity) when it was really needed?: Patient unable to answer   Transportation Needs: Unknown (3/25/2025)    Transportation Needs     Within the past 12 months, has lack of transportation kept you from medical appointments, getting your medicines, non-medical meetings or appointments, work, or from getting things that you need?: Patient unable to answer   Interpersonal Safety: Unknown (3/25/2025)    Interpersonal Safety     Do you feel physically and emotionally safe where you currently live?: Patient unable to answer     Within the past 12 months, have you  been hit, slapped, kicked or otherwise physically hurt by someone?: Patient unable to answer     Within the past 12 months, have you been humiliated or emotionally abused in other ways by your partner or ex-partner?: Patient unable to answer      Disposition Plan     Medically Ready for Discharge: Anticipated in 2-4 Days       The patient's care was discussed with the Attending Physician, Dr. Granados .    Anthony Moore  Medical Student  Medicine Service, [facility] TEAM 2  [employer]  Securely message with [employer] info)  Text page via [employer] Paging/Directory   See signed in provider for up to date coverage information  ____________________________________________________________________    I saw the patient with the medical student and agree with the findings    Venus Finney MD  PGY-1  Internal Medicine   [employer]       Subjective  Talked to Isabell's daughter Nehal about the plan here in the hospital including continuing CTX, use of abdominal binder/compression socks, and pain management for Isabell's fracture. Isabell rated her pain with her wrist as a 4/10. Reports left shin pain with visible old ecchymosis. Nehal discussed concerns of increased risk of stroke with high blood pressures.    Physical Exam   Vital Signs: Temp: 98  F (36.7  C) Temp src: Oral BP: 117/74 Pulse: 82   Resp: 18 SpO2: 97 % O2 Device: None (Room air)    Weight: 140 lbs 10.46 oz    General: Sitting in bed comfortably. NAD. Well developed and nourished.  HEENT: NC, MMM, EOMI with 1 mm pupils bilaterally, clear conjunctivae, normal oropharynx. Left periorbital swelling and ecchymosis.   Cardiovascular: RRR, No murmurs, rubs, or gallops. Normal S1 and S2.  Pulmonary: CTAB, No wheezes/rhonchi/rales, Normal inspiratory effort.  Abdominal: Soft, non-distended, non-tender. Normal bowel sounds.  Extremities: Left wrist pain with minimal movement. Bilateral hip  pain with right hip bruising. Yellowing ecchymosis present on the proximal shin.  Skin: Clean, warm, dry, intact  Neuro: A&O to self, not orientated to time or place. Speech is slow and disjointed. CN II-XII grossly intact. Moving all extremities.      Medical Decision Making         Data   Imaging results reviewed over the past 24 hrs:   No results found for this or any previous visit (from the past 24 hours).

## 2025-03-27 ENCOUNTER — APPOINTMENT (OUTPATIENT)
Dept: PHYSICAL THERAPY | Facility: CLINIC | Age: 65
DRG: 563 | End: 2025-03-27
Payer: COMMERCIAL

## 2025-03-27 ENCOUNTER — APPOINTMENT (OUTPATIENT)
Dept: OCCUPATIONAL THERAPY | Facility: CLINIC | Age: 65
DRG: 563 | End: 2025-03-27
Payer: COMMERCIAL

## 2025-03-27 DIAGNOSIS — M25.532 WRIST PAIN, LEFT: Primary | ICD-10-CM

## 2025-03-27 LAB
BACTERIA BLD CULT: NORMAL
BACTERIA BLD CULT: NORMAL
GLUCOSE BLDC GLUCOMTR-MCNC: 179 MG/DL (ref 70–99)
GLUCOSE BLDC GLUCOMTR-MCNC: 184 MG/DL (ref 70–99)
GLUCOSE BLDC GLUCOMTR-MCNC: 196 MG/DL (ref 70–99)
GLUCOSE BLDC GLUCOMTR-MCNC: 265 MG/DL (ref 70–99)
GLUCOSE BLDC GLUCOMTR-MCNC: 389 MG/DL (ref 70–99)

## 2025-03-27 PROCEDURE — 99233 SBSQ HOSP IP/OBS HIGH 50: CPT | Mod: 24

## 2025-03-27 PROCEDURE — 97530 THERAPEUTIC ACTIVITIES: CPT | Mod: GP

## 2025-03-27 PROCEDURE — 120N000002 HC R&B MED SURG/OB UMMC

## 2025-03-27 PROCEDURE — 250N000013 HC RX MED GY IP 250 OP 250 PS 637

## 2025-03-27 PROCEDURE — 97535 SELF CARE MNGMENT TRAINING: CPT | Mod: GO

## 2025-03-27 PROCEDURE — 250N000011 HC RX IP 250 OP 636

## 2025-03-27 PROCEDURE — 99232 SBSQ HOSP IP/OBS MODERATE 35: CPT | Mod: GC | Performed by: STUDENT IN AN ORGANIZED HEALTH CARE EDUCATION/TRAINING PROGRAM

## 2025-03-27 PROCEDURE — 97112 NEUROMUSCULAR REEDUCATION: CPT | Mod: GP

## 2025-03-27 RX ORDER — HYDRALAZINE HYDROCHLORIDE 10 MG/1
10 TABLET, FILM COATED ORAL 2 TIMES DAILY
Status: DISCONTINUED | OUTPATIENT
Start: 2025-03-28 | End: 2025-03-30 | Stop reason: HOSPADM

## 2025-03-27 RX ORDER — ACETAMINOPHEN 325 MG/1
975 TABLET ORAL EVERY 6 HOURS
Status: DISCONTINUED | OUTPATIENT
Start: 2025-03-27 | End: 2025-03-30 | Stop reason: HOSPADM

## 2025-03-27 RX ORDER — HYDRALAZINE HYDROCHLORIDE 25 MG/1
25 TABLET, FILM COATED ORAL AT BEDTIME
Status: DISCONTINUED | OUTPATIENT
Start: 2025-03-27 | End: 2025-03-30 | Stop reason: HOSPADM

## 2025-03-27 RX ORDER — LIDOCAINE 4 G/G
1 PATCH TOPICAL
Status: DISCONTINUED | OUTPATIENT
Start: 2025-03-28 | End: 2025-03-30 | Stop reason: HOSPADM

## 2025-03-27 RX ADMIN — CYANOCOBALAMIN TAB 500 MCG 1000 MCG: 500 TAB at 09:24

## 2025-03-27 RX ADMIN — PIPERACILLIN AND TAZOBACTAM 2.25 G: 2; .25 INJECTION, POWDER, FOR SOLUTION INTRAVENOUS at 06:12

## 2025-03-27 RX ADMIN — METHOCARBAMOL 500 MG: 500 TABLET ORAL at 12:21

## 2025-03-27 RX ADMIN — DULOXETINE HYDROCHLORIDE 20 MG: 20 CAPSULE, DELAYED RELEASE ORAL at 09:24

## 2025-03-27 RX ADMIN — HYDRALAZINE HYDROCHLORIDE 25 MG: 25 TABLET ORAL at 21:41

## 2025-03-27 RX ADMIN — ACETAMINOPHEN 975 MG: 325 TABLET, FILM COATED ORAL at 10:08

## 2025-03-27 RX ADMIN — AMLODIPINE BESYLATE 5 MG: 5 TABLET ORAL at 08:27

## 2025-03-27 RX ADMIN — ACETAMINOPHEN 975 MG: 325 TABLET, FILM COATED ORAL at 09:22

## 2025-03-27 RX ADMIN — Medication 2 G: at 13:37

## 2025-03-27 RX ADMIN — DICLOFENAC SODIUM 2 G: 10 GEL TOPICAL at 10:09

## 2025-03-27 RX ADMIN — LEVETIRACETAM 500 MG: 500 TABLET, FILM COATED ORAL at 19:52

## 2025-03-27 RX ADMIN — ACETAMINOPHEN 975 MG: 325 TABLET, FILM COATED ORAL at 17:04

## 2025-03-27 RX ADMIN — Medication 2 G: at 19:52

## 2025-03-27 RX ADMIN — LEVETIRACETAM 500 MG: 500 TABLET, FILM COATED ORAL at 09:25

## 2025-03-27 RX ADMIN — ATORVASTATIN CALCIUM 40 MG: 20 TABLET, FILM COATED ORAL at 09:23

## 2025-03-27 RX ADMIN — PIPERACILLIN AND TAZOBACTAM 2.25 G: 2; .25 INJECTION, POWDER, FOR SOLUTION INTRAVENOUS at 00:29

## 2025-03-27 RX ADMIN — ACETAMINOPHEN 975 MG: 325 TABLET, FILM COATED ORAL at 02:03

## 2025-03-27 RX ADMIN — LEVOTHYROXINE SODIUM 100 MCG: 0.05 TABLET ORAL at 09:23

## 2025-03-27 RX ADMIN — ACETAMINOPHEN 975 MG: 325 TABLET, FILM COATED ORAL at 21:41

## 2025-03-27 RX ADMIN — Medication 1 MG: at 02:03

## 2025-03-27 RX ADMIN — GABAPENTIN 100 MG: 100 CAPSULE ORAL at 21:41

## 2025-03-27 RX ADMIN — LORATADINE 10 MG: 10 TABLET ORAL at 09:23

## 2025-03-27 ASSESSMENT — ACTIVITIES OF DAILY LIVING (ADL)
ADLS_ACUITY_SCORE: 73
ADLS_ACUITY_SCORE: 70
ADLS_ACUITY_SCORE: 73
ADLS_ACUITY_SCORE: 70
ADLS_ACUITY_SCORE: 70
ADLS_ACUITY_SCORE: 73
ADLS_ACUITY_SCORE: 70
ADLS_ACUITY_SCORE: 73
ADLS_ACUITY_SCORE: 70
ADLS_ACUITY_SCORE: 73
ADLS_ACUITY_SCORE: 73
ADLS_ACUITY_SCORE: 70
ADLS_ACUITY_SCORE: 70
ADLS_ACUITY_SCORE: 73
ADLS_ACUITY_SCORE: 73
ADLS_ACUITY_SCORE: 70
ADLS_ACUITY_SCORE: 70
ADLS_ACUITY_SCORE: 73
ADLS_ACUITY_SCORE: 70
ADLS_ACUITY_SCORE: 73
ADLS_ACUITY_SCORE: 70

## 2025-03-27 NOTE — PROGRESS NOTES
Cardiology Consultation Note    Date of Service: 3/27/2025            ASSESSMENT / RECOMMENDATIONS     Isabell Matthews is a 64 year old female admitted on 3/23/2025. She has a notable history of frequent falls, recurrent UTI, vascular dementia, CVA (most recent 06/2024), HTN, HLD, CKD IIIb, T1DM c/b neuropathy, and seizures Cardiology was consulted for assistance with orthostatic hypotension.    Ms. Matthews  indeed has very significant orthostatic hypotension.  EKG nonsuggestive of arrhythmias.  Vitals during this admission did not show any bradycardia.   she does not have any signs or symptoms of clinical heart failure, is euvolemic and is free of chest pain     Given her significant orthostatic hypotension, switching her long-acting antihypertensive amlodipine to short acting blood pressure medication such as hydralazine would be reasonable.  At this time.  Will discontinue amlodipine, agree with holding Coreg, would initiate hydralazine 10 mg POx1 in the morning,  hydralazine 10 mg p.o. x 1 8 hours after the morning dose, and hydralazine 25 mg p.o. x 1 nightly before sleep.  these doses can be further titrated based on her response.    Thank you for allowing us to participate in the care of this patient.    The patient was discussed w/ Dr. Valentine. Cardiology will sign off at this time. Please call with questions.    Yaz Armstrong MD  Cardiovascular Disease Fellow             HISTORY OF PRESENT ILLNESS     Isabell Matthews is a 64 year old female admitted on 3/23/2025. She has a notable history of frequent falls, recurrent UTI, vascular dementia, CVA (most recent 06/2024), HTN, HLD, CKD IIIb, T1DM c/b neuropathy, and seizures Cardiology was consulted for assistance with orthostatic hypotension.    Ms. Matthews has been experiencing numerous falls especially during the spring of last year which has been worsening during the past month.  She was found down at home by her daughter who is her caregiver.  Due to mild to moderate  "cognitive impairment, we were unable to obtain thorough history, however she denies loss of consciousness.    Per chart review, primary team obtained orthostatic vitals and documented   As below  \"Lyin/81  Sittin/76, 136/79 recheck  Standin/74\"    During interview, she denies chest pain, shortness of breath, orthopnea, leg swelling, palpitations.  Oriented to self,      ROS:   A comprehensive 10 point ROS was negative other than as mentioned in HPI.           CARDIAC HISTORY AND IMAGING     EKG: NSR, inferior q waves, poor precordial R wave progression    ECHO:     2024  Left ventricular size, wall motion and function are normal. The ejection  fraction is 60-65%.  Right ventricular function, chamber size, wall motion, and thickness are  normal.  The atrial septum is intact as assessed by color Doppler and agitated saline  bubble study .  Mild mitral insufficiency is present.  The inferior vena cava was normal in size with preserved respiratory  variability. Small circumferential pericardial effusion is present without any  hemodynamic significance.    Imaging/Angiography:     CTA 2025    IMPRESSION:   HEAD CT:  No acute intracranial process.     HEAD CTA:  1.  Extensive multifocal areas of luminal irregularity are slightly progressed when compared to 3/7/2024. The progression is most notable at the left A2 segment.  2.  No branch occlusion, aneurysm or AVM.     NECK CTA:  No flow limiting stenosis or dissection.     MRA Neck 2025  FINDINGS: Neck MRA demonstrates patent and antegrade major cervical  vasculature. No occlusion or high-grade stenosis of the bilateral  cervical internal carotid and vertebral arteries on this limited  noncontrast MR angiogram.           PAST MEDICAL HISTORY     Past Medical History:   Past Medical History:   Diagnosis Date    Chronic pain     Coronary atherosclerosis 2016    Essential hypertension     Ex-cigarette smoker     quit 2018 over 35 years " "   Ex-cigarette smoker     Hyperlipidemia     Hypothyroid     Seizures (H)     Stroke (H)     Vascular dementia (H)      Past Surgical History:   Past Surgical History:   Procedure Laterality Date    athroplasty hip Bilateral     ,     OTHER SURGICAL HISTORY      athroplasty hip    PICC DOUBLE LUMEN PLACEMENT Right 2023    right basilic 5 fr dl power picc 39 cm    STENT       Allergies:     Allergies   Allergen Reactions    Seasonal Allergies      Medications:   No current outpatient medications on file.     Family History:   Family History   Problem Relation Age of Onset    Acute Myocardial Infarction Father     Heart Disease Maternal Grandmother     Dementia Maternal Grandmother     Myocardial Infarction Father     Dementia Paternal Grandmother     Heart Disease Paternal Grandmother      Social History:   Social History     Tobacco Use    Smoking status: Former     Current packs/day: 0.00     Average packs/day: 0.5 packs/day for 35.0 years (17.5 ttl pk-yrs)     Types: Cigarettes     Start date: 1983     Quit date: 2018     Years since quittin.7    Smokeless tobacco: Never   Substance Use Topics    Alcohol use: Not Currently              PHYSICAL EXAM     /83 (BP Location: Right arm)   Pulse 86   Temp 97.9  F (36.6  C) (Oral)   Resp 16   Ht 1.6 m (5' 2.99\")   Wt 62.4 kg (137 lb 9.1 oz)   SpO2 97%   BMI 24.38 kg/m       Intake/Output Summary (Last 24 hours) at 3/27/2025 1332  Last data filed at 3/27/2025 1206  Gross per 24 hour   Intake 880 ml   Output 1000 ml   Net -120 ml     Wt Readings from Last 1 Encounters:   25 62.4 kg (137 lb 9.1 oz)       General: AAO x 3 slow to respond, and mildly confused  Neck: JVP is 5 cm  CV: S1 S2 regular; no m/r/g  Lungs: CTABL, no rhonchi or wheezing  Extremities: No pretibial edema, warm and well perfused  Skin: warm, no rashes          DIAGNOSTICS     Labs:  Recent Labs   Lab 25  0846 25  1118 25  0743   WBC 5.6 5.9 " 6.7   HGB 11.3* 11.1* 10.5*   HCT 35.5 35.3 33.3*   MCV 86 89 89    206 191     Recent Labs   Lab 03/26/25  0846 03/25/25  1118    139   POTASSIUM 4.3 4.3   CHLORIDE 100 105   CO2 24 22   ANIONGAP 11 12   BUN 27.6* 26.0*   CR 1.52* 1.42*   VERONICA 9.2 9.2       Troponin:   NT-proBNP:    Recent Labs   Lab Test 06/16/24  0824 05/20/23  1312   CHOL 134 136   HDL 43* 35*   LDL 67 71   TRIG 119 152*

## 2025-03-27 NOTE — PLAN OF CARE
Goal Outcome Evaluation:      Plan of Care Reviewed With: patient    Overall Patient Progress: improvingOverall Patient Progress: improving    Outcome Evaluation: 2491-1397 Alert and oriented to self and place. Forgetful, follow commands. Delirium preventions in place. Left hand swollen; skin assessed under brace, ensured brace wasn't on too tight and elevated hand. Complaints of 4/10 discomfort in left hand/hip, scheduled tylenol given. Bed alarm on/chair alarm on. Up to recliner for dinner. Call light within reach. Q1hr rounding completed [id number].

## 2025-03-27 NOTE — PROGRESS NOTES
Inpatient Diabetes Management Service: Daily Progress Note     HPI: Isabell Matthews is a 64 year old female admitted on 3/23/2025. She has a notable history of frequent falls, recurrent UTI, vascular dementia, CVA (most recent 06/2024), HTN, HLD, CKD IIIb, T1DM c/b neuropathy, and seizures. Was admitted for dizziness and falls, found to have L wrist fx. Remains admitted for further workup and surgical consultation. Inpatient Diabetes Service consulted for glycemicmangement.          Assessment/Plan:     Assessment:   Type 1 versus Type 2  Diabetes Mellitus with DEMETRIA elevated at 6.2 and C-peptide=<0.1 on 6/17/2024, complicated by peripheral neuropathy, nephropathy, hx of DKA and hypoglycemia. Sub-optimal control (A1c 8.8 % 3/23/25, Hgb: 10.5)  Dementia - daughter is caregiver and manages diabetes for patient.   Stress hyperglycemia    Plan/Recommendations:   - Lantus 18 units q 24 hrs at 2200   - Change Novolog Meal Coverage: 1 units per 20 --> 15 g CHO TID AC and 1:20 PRN with snacks/supplements   - Novolog Correction Scale: 1:50>140 TID AC, >200 HS, 0200 (medium resistance)  - BG monitoring: TID AC, HS, 0200  - Hypoglycemia protocol    - Carb counting protocol     Discussion:   Hyperglycemic overnight with no clear cause. No reports of snacks. Pt was up often overnight so possibly agitation played a role. No change to Lantus dose today. Pt was mildly elevated after meals yesterday, so will increase carb coverage today. Called pt's daughter to update. Per daughter's report pt will get a neurology and cardiology consult today.     Per ADA guidelines, treatment goals and recommendations for older adults with DM and medically complexity is less stringent BG control/A1c for safety - targets of 110-180 mg/dL and up to 250 mg/dL reasonable.  Avoid reliance on A1c.  Glucose decisions should be based on avoidance of hypoglycemia and symptomatic hyperglycemia.      Discharge Planning:  (tentative)  Medications: TBD. Likely adjustment of PTA doses. Needs set meal dose for day program on Rx (with correction dosing written out), would be able to use ICR at home.   Test Claims: none needed.   Education: Needs to be assessed closer to discharge.    Outpatient Follow-up: recommend PCP     TENTATIVE Diabetes Management Discharge Plan  Instructions to patient were posted in AVS and discussed on day of discharge.   Medications and supplies are to be ordered by primary service on discharge.   *please use the [email or web address] non-branded discharge supply order set ([id number])*    Patient will need the following supplies prescribed:   none    Blood glucose monitoring: Three times daily before meals, and at bedtime.  Blood Glucose (BG) goals: 100-180 mg/dL before meals, less than 250 mg/dL 2 hrs after meals.     Glucose Control Regimen:  1) Long-acting insulin: glargine (Lantus) - take 18 units once daily at bedtime. Take at same time each day.  If there is a low BG overnight x2 during the week, decrease this dose by 2 units.     2) Rapid-acting insulin: lispro (Humalog) carbohydrate coverage/mealtime insulin -   At Home:  take 1 unit per __ grams of carbohydrates before meals and snacks.  At Day Center:  Take 2 units before meals    4) Rapid-acting insulin: lispro (Humalog) correction - see chart below. Take for high blood glucoses three times daily before meals and at bedtime.  You may add the correction dose to the carbohydrate coverage/mealtime insulin dose and give in one injection--ideally 10-15 minutes before a meal.  You may take the correction dose even if you skip a meal (as long as it has been 4 hours since previous correction dose).     Blood Glucose At Meals, Add At Bedtime, Take   Less than 150 None None   150-199 1 unit None   200-249 2 units 1 unit   250-299 3 units 2 units   300-349 4 units 3 units   350-399 5 units 4 units   400-449 6 units 5 units   450 or greater 7 units 6 units        Outpatient  Follow-Up: We recommend scheduling an outpatient diabetes appointment 2-4 weeks from discharge. You can call the [facility] at [phone] if you have scheduling questions or do not hear from them within a few days of discharge. Follow up sooner if blood glucose runs consistently greater than 200 mg/dL or if having more than two episodes less than 70 mg/dL.     If you have urgent questions or concerns regarding your blood sugars or insulin, you may contact [phone] (the main hospital ). Ask to speak with the Endocrinologist on call.     Your target A1c value is less than 8% to help prevent future complications from diabetes. Your most recent A1c is 8.8%.      Thank you for letting the Diabetes Management Team be involved in your care!      Please notify Inpatient Diabetes Service if changes are planned to steroids, nutrition, TPN/TF and anticipated procedures requiring prolonged NPO status.         Interval History/Review of Systems :   The last 24 hours progress and nursing notes reviewed.  - No acute events overnight.  - Pt up much of the night, no PO intake.     Planned Procedures/Surgeries: none    Inpatient Glucose Control:       Recent Labs   Lab 03/27/25  1321 03/27/25  0938 03/27/25  0201 03/26/25  2219 03/26/25  2006 03/26/25  1807   * 196* 389* 278* 292* 179*             Medications Impacting Glycemia:   Steroids:  none   D5W-containing solutions/medications: none   Other medications impacting glucose: none         Nutrition:   Orders Placed This Encounter      Moderate Consistent Carb (60 g CHO per Meal) Diet    Supplements: none   TF: none   TPN: none         Diabetes History: see full consult note for complete diabetes history   Diabetes Type and Duration: DM for > 40 years, diagnosed in her 40s. Per daughter,  initially told she has T2DM then T1DM for a while then flipped back to being told T2DM.      6/17/2024 positive GAD65 antibody and c-peptide <0.1 but BG  "elevated     PTA Medication Regimen:   - Lantus 20 units at night   - Novolog 2 units before meals   - Novolog correction 1:50>150 TID AC  Missing doses?: may not get fixed meal dose at day program, may skip meal dose if BG <150 mg/dL  Historical Diabetes Medications: insulins     Glucose monitoring device/frequency/trends: glucometer before meals and at bedtime. Has tried and failed multiple CGMs (issues with accuracy or patient picking CGM off).   Hypoglycemia PTA:   - Frequency: 2x per month (in the morning)  - Severity: + history of severe unconscious lows   - Awareness: variable; will go make    - Treatment: 15g cho      Outpatient Diabetes Provider: PCP - Bony Castaneda   Formal Diabetes Education/Educator: Rohini Villa, Pharm D (LOV 3/20/25)        Physical Exam:   /83 (BP Location: Right arm)   Pulse 86   Temp 97.9  F (36.6  C) (Oral)   Resp 16   Ht 1.6 m (5' 2.99\")   Wt 62.4 kg (137 lb 9.1 oz)   SpO2 97%   BMI 24.38 kg/m    Constitutional: tired-appearing patient in no acute distress.  Eyes: sclera anicteric.  Respiratory: No signs of labored breathing.  Skin:  ecchymosis around left eye, abrasion visible         Data:     Lab Results   Component Value Date    A1C 8.8 (H) 03/23/2025    A1C 9.6 (H) 01/16/2025    A1C 11.2 (H) 09/23/2024    A1C 10.2 (H) 05/20/2024    A1C 10.7 (H) 01/29/2024    A1C 7.8 (H) 06/21/2021    A1C 11.7 (H) 09/27/2020       ROUTINE IP LABS (Last four results)  BMP  Recent Labs   Lab 03/27/25  1321 03/27/25  0938 03/27/25  0201 03/26/25  2219 03/26/25  0851 03/26/25  0846 03/25/25  1249 03/25/25  1118 03/24/25  0836 03/24/25  0743 03/23/25  1608 03/23/25  1017   NA  --   --   --   --   --  135  --  139  --  138  --  140   POTASSIUM  --   --   --   --   --  4.3  --  4.3  --  4.2  --  4.9   CHLORIDE  --   --   --   --   --  100  --  105  --  103  --  106   VERONICA  --   --   --   --   --  9.2  --  9.2  --  9.1  --  9.3   CO2  --   --   --   --   --  24  --  22  --  23  --  " 20*   BUN  --   --   --   --   --  27.6*  --  26.0*  --  35.4*  --  45.0*   CR  --   --   --   --   --  1.52*  --  1.42*  --  1.46*  --  1.54*   * 196* 389* 278*   < > 392*   < > 212*   < > 315*   < > 345*    < > = values in this interval not displayed.     CBC  Recent Labs   Lab 03/26/25  0846 03/25/25  1118 03/24/25  0743 03/23/25  1017   WBC 5.6 5.9 6.7 5.5   RBC 4.12 3.98 3.74* 3.78*   HGB 11.3* 11.1* 10.5* 10.5*   HCT 35.5 35.3 33.3* 33.6*   MCV 86 89 89 89   MCH 27.4 27.9 28.1 27.8   MCHC 31.8 31.4* 31.5 31.3*   RDW 14.4 14.5 14.5 14.6    206 191 185     INRNo lab results found in last 7 days.    Inpatient Diabetes Service will continue to follow, please don't hesitate to contact the team with any questions or concerns.     Mirela Anand PA-C  Inpatient Diabetes Service  Available on [employer] or Secure Chat     Plan discussed with patient, bedside RN, and primary team via this note.    To contact Inpatient Diabetes Service:     7 AM - 5 PM: Page the [email or web address] BULMARO following the patient that day (see filed or incomplete progress notes/consult notes under Endocrinology)    OR if uncertain of provider assignment: page job code [phone]    5 PM - 7 AM: First call after hours is to primary service.    For urgent after-hours questions, page job code for on call fellow: [phone]     I spent a total of 50 minutes on the date of the encounter doing prep/post-work, chart review, history and exam, documentation and further activities per the note including lab review, multidisciplinary communication, counseling the patient and/or coordinating care regarding acute hyper/hypoglycemic management, as well as discharge management and planning/communication.

## 2025-03-27 NOTE — PLAN OF CARE
"Goal Outcome Evaluation:    HTN above parameters in morning. See provider notification. Has improved throughout day. Did become orthostatic/lightheaded with therapy but better than yesterday. Binder and compression stockings in place. Pivoted to recliner and sat x 1 hour. BG improved today; covered per orders. Good appetite and on calorie counts. Per Daughter, patient is more oriented today. Reoriented thoughout day, lights on and TV on. Bed alarm for safety. Incontinent of large amount of urine x 1; purewick changed. Incontinent of stool. Reports \"soreness\" in left wrist and pain in left groin and hip. Tylenol, Robaxin and voltaren gel given with relief.                         "

## 2025-03-27 NOTE — PLAN OF CARE
Goal Outcome Evaluation:      Plan of Care Reviewed With: patient    Overall Patient Progress: improving    Outcome Evaluation: Assumed cares: 3510-0022  Vitals: Hypertensive, on RA  Cardiac: Hypertensive but within parameters  Neuros: A&Ox1, alert to self, confused, bed alarm on  IV: R PIV SL  Labs/Electrolytes: Review labs in am  Resp: WNL, denies SOB  Diet: Regular, carb counts  : Purewick in place  GI: No BM this shift, passing gas  Skin: No new concerns noted  Pain: 2/10 managed with PRN tylenol  Activity: Ax1-2, pivot GB, WC  Plan: Continue to follow POC, notify provider of any changes  Updates this shift: Redirected multiple times throughout the night.

## 2025-03-27 NOTE — PROGRESS NOTES
[facility]    Progress Note - Medicine Service, MAROON TEAM 2       Date of Admission:  3/23/2025    Assessment & Plan   Isabell Matthews is a 64 year old female admitted on 3/23/2025. She has a notable history of frequent falls, recurrent UTI, vascular dementia, CVA (most recent 06/2024), HTN, HLD, CKD IIIb, T1DM c/b neuropathy, and seizures. Was admitted for dizziness and falls, found to have L wrist fx. Remains admitted for further workup and surgical consultation.     Today's Updates:  - Recheck of orthostatic blood pressure readings by PT today were positive  - Consulting Cardiology for continued orthostatic hypotension complicated by persistent hypertension.  - Orientated to self, time, and place this morning. Seems more alert.   - Talked to Pharmacy, reported probable contaminate in urine sample, stopped antibiotics  - Scheduled diclofenac gel and Tylenol, added lidocaine patches for better pain management  - Endo adjusted medications for glucose maintenance       AMS?  Dizziness  Recurrent falls  Presenting after an unwitnessed fall morning of 3/23. Was found on floor by pt's daughter noting pain in L side and bleeding from face. Unclear what pt's baseline cognitive status is. Currently is alert and somewhat oriented to place but not to time or self. This appears different from Neuro exam noted at Neurology visit in October 2024 but on discussion with pt's daughter over the phone she reports that Isabell's mentation is unchanged. Has slowed speech but is able to answer questions appropriately and follow commands. Non-con CT head without acute intracranial pathology or fracture. No focal neuro deficits. CXR without infectious signs. EKG sinus rhythm without ST or T wave changes. Normal carotid vasculature in 06/2024. Dizziness may be 2/2 infection, medication s/e, orthostatic hypotension, progression of vascular disease, or arrhythmia.  - Orthostatic BP measurement  -  PT recommend discharge to home with family training for transfers and gait. At home care includes assistance with PT/OT/nursing services.  - Telemetry was not tolerated by patient   - Discussed possible higher level of care, daughter is working with  for possible options  - Discussed possible Neuro outpatient on discharge, daughter reports following with Neuro     L wrist fx  L periorbital Hematoma  2/2 fall as above. Imaging in ED showing acute, comminuted, and impacted fracture of L distal radius. No facial fracture.   - Ortho determined that surgery was not indicated, recommended wearing the brace continuously and following up in a week (3/31)  - Multimodal pain management    Bacturia with possible UTI  Pt has a history of recurrent UTIs. Most recently pan-sensitive E.coli in January 2025. Does not report any sx on admission but UA positive for nitrites and some bacteria. No LE or WBC. S/p 1 dose CTX in ED.   - Ucx pending  - Stopped Abxs after discussion with Pharmacy for probable contaminate     Hypothyroidism  Pt is on 75 mcg levothyroxine daily. Low T4 (0.67) with high TSH (37.30) on admission.   - Increase to 100 mcg levothyroxine daily     DM1  A1c of 9.6% on January 2025. BS in 300s on admission.   - A1c ordered  - Home regimen:  LDSSI  Lantus 20u at bedtime  2u mealtime with additional correction scale   - In hospital:  Hypoglycemia protocol  Start 20u Lantus and MDSSI  - Endo consulted   Adjusted Lantus to 18u     CKD3b  Baseline Cr of 1.5. Cr on admission of 1.5.   - CTM    HTN  HLD  BP on admission of 203, 140 on repeat after treatment of pain.   - Continue Atorvastatin 40mg  - Holding Amlodipine 5mg  - Holding Carvedilol 12.5mg BID    Prior CVAs (ischemic and hemorrhagic)  Vascular dementia  No focal deficits on exam today.   - Holding ASA pending surgical plan  - Recommend getting MOCA once pt is past possible acute infection/recrudesence period and closer to discharge    H/o  seizures  Reported history of seizures iso DKA and strokes. None reported in last 4 years  - Continue Keppra 500mg BID      Diet: Moderate Consistent Carb (60 g CHO per Meal) Diet    DVT Prophylaxis: Pneumatic Compression Devices  Parker Catheter: Not present  Fluids: PO  Lines: None     Cardiac Monitoring: None  Code Status: No CPR- Do NOT Intubate      Clinically Significant Risk Factors                   # Hypertension: Noted on problem list     # Dementia: noted on problem list            # Financial/Environmental Concerns: none         Social Drivers of Health   Food Insecurity: Unknown (3/25/2025)    Food Insecurity     Within the past 12 months, did you worry that your food would run out before you got money to buy more?: Patient unable to answer     Within the past 12 months, did the food you bought just not last and you didn t have money to get more?: Patient unable to answer   Housing Stability: Unknown (3/25/2025)    Housing Stability     Do you have housing? : Patient unable to answer     Are you worried about losing your housing?: Patient unable to answer   Tobacco Use: Medium Risk (3/23/2025)    Patient History     Smoking Tobacco Use: Former     Smokeless Tobacco Use: Never   Financial Resource Strain: Unknown (3/25/2025)    Financial Resource Strain     Within the past 12 months, have you or your family members you live with been unable to get utilities (heat, electricity) when it was really needed?: Patient unable to answer   Transportation Needs: Unknown (3/25/2025)    Transportation Needs     Within the past 12 months, has lack of transportation kept you from medical appointments, getting your medicines, non-medical meetings or appointments, work, or from getting things that you need?: Patient unable to answer   Interpersonal Safety: Unknown (3/25/2025)    Interpersonal Safety     Do you feel physically and emotionally safe where you currently live?: Patient unable to answer     Within the past 12  months, have you been hit, slapped, kicked or otherwise physically hurt by someone?: Patient unable to answer     Within the past 12 months, have you been humiliated or emotionally abused in other ways by your partner or ex-partner?: Patient unable to answer      Disposition Plan     Medically Ready for Discharge: Anticipated in 2-4 Days       The patient's care was discussed with the Attending Physician, Dr. Granados .    Anthony Moore  Medical Student  Medicine Service, MARO[facility] TEAM 2  [facility]  Securely message with [employer] info)  Text page via [facility] Paging/Directory   See signed in provider for up to date coverage information  ____________________________________________________________________    I saw the patient with the medical student and agree with the findings    Venus Finney MD  PGY-1  Internal Medicine   [facility]       Subjective    Talked to Isabell's daughter Nehal discussed overnight events and medication changes and discussed further plan to involve Cardiology for management of orthostatic hypotension complicated by persistent hypertension. Noted changes in pain management plan to scheduled Tylenol and diclofenac gel, with introduction of lidocaine patches. Discussed conversation with Pharmacy about probable UA contaminate, and discontinuation of Abxs.    Physical Exam   Vital Signs: Temp: 97.9  F (36.6  C) Temp src: Oral BP: 139/83 Pulse: 86   Resp: 16 SpO2: 97 % O2 Device: None (Room air)    Weight: 137 lbs 9.07 oz    General: Sitting in bed comfortably. NAD. Well developed and nourished.  HEENT: NC, MMM, EOMI with 1 mm pupils bilaterally, clear conjunctivae, normal oropharynx. Left periorbital swelling and purple and yellowing ecchymosis.   Cardiovascular: RRR, No murmurs, rubs, or gallops. Normal S1 and S2.  Pulmonary: CTAB, No wheezes/rhonchi/rales, Normal inspiratory effort.  Abdominal: Soft,  non-distended, non-tender. Normal bowel sounds.  Extremities: Left wrist pain with minimal movement. Bilateral hip pain with right hip bruising. Yellowing ecchymosis present on the proximal shin.  Skin: Clean, warm, dry, intact  Neuro: A&O to self, not orientated to time or place. Speech is slow and disjointed. CN II-XII grossly intact. Moving all extremities.      Medical Decision Making         Data   Imaging results reviewed over the past 24 hrs:   No results found for this or any previous visit (from the past 24 hours).

## 2025-03-28 VITALS
BODY MASS INDEX: 24.38 KG/M2 | WEIGHT: 137.57 LBS | SYSTOLIC BLOOD PRESSURE: 169 MMHG | HEART RATE: 88 BPM | RESPIRATION RATE: 16 BRPM | TEMPERATURE: 97.8 F | HEIGHT: 63 IN | OXYGEN SATURATION: 96 % | DIASTOLIC BLOOD PRESSURE: 75 MMHG

## 2025-03-28 LAB
BACTERIA BLD CULT: NO GROWTH
BACTERIA BLD CULT: NO GROWTH
GLUCOSE BLDC GLUCOMTR-MCNC: 143 MG/DL (ref 70–99)
GLUCOSE BLDC GLUCOMTR-MCNC: 184 MG/DL (ref 70–99)
GLUCOSE BLDC GLUCOMTR-MCNC: 188 MG/DL (ref 70–99)
GLUCOSE BLDC GLUCOMTR-MCNC: 239 MG/DL (ref 70–99)
GLUCOSE BLDC GLUCOMTR-MCNC: 323 MG/DL (ref 70–99)

## 2025-03-28 PROCEDURE — 99233 SBSQ HOSP IP/OBS HIGH 50: CPT | Mod: 24 | Performed by: NURSE PRACTITIONER

## 2025-03-28 PROCEDURE — 99232 SBSQ HOSP IP/OBS MODERATE 35: CPT | Mod: GC | Performed by: STUDENT IN AN ORGANIZED HEALTH CARE EDUCATION/TRAINING PROGRAM

## 2025-03-28 PROCEDURE — 250N000013 HC RX MED GY IP 250 OP 250 PS 637

## 2025-03-28 PROCEDURE — 120N000002 HC R&B MED SURG/OB UMMC

## 2025-03-28 RX ORDER — NICOTINE 21 MG/24HR
1 PATCH, TRANSDERMAL 24 HOURS TRANSDERMAL EVERY 24 HOURS
Status: DISCONTINUED | OUTPATIENT
Start: 2025-03-28 | End: 2025-03-30 | Stop reason: HOSPADM

## 2025-03-28 RX ADMIN — ATORVASTATIN CALCIUM 40 MG: 20 TABLET, FILM COATED ORAL at 08:33

## 2025-03-28 RX ADMIN — DULOXETINE HYDROCHLORIDE 20 MG: 20 CAPSULE, DELAYED RELEASE ORAL at 08:34

## 2025-03-28 RX ADMIN — Medication 2 G: at 15:44

## 2025-03-28 RX ADMIN — HYDRALAZINE HYDROCHLORIDE 10 MG: 10 TABLET ORAL at 13:18

## 2025-03-28 RX ADMIN — CYANOCOBALAMIN TAB 500 MCG 1000 MCG: 500 TAB at 08:33

## 2025-03-28 RX ADMIN — HYDRALAZINE HYDROCHLORIDE 10 MG: 10 TABLET ORAL at 08:34

## 2025-03-28 RX ADMIN — LEVOTHYROXINE SODIUM 100 MCG: 0.05 TABLET ORAL at 08:33

## 2025-03-28 RX ADMIN — LIDOCAINE 4% 1 PATCH: 40 PATCH TOPICAL at 08:34

## 2025-03-28 RX ADMIN — ACETAMINOPHEN 975 MG: 325 TABLET, FILM COATED ORAL at 10:08

## 2025-03-28 RX ADMIN — GABAPENTIN 100 MG: 100 CAPSULE ORAL at 21:35

## 2025-03-28 RX ADMIN — Medication 2 G: at 08:36

## 2025-03-28 RX ADMIN — LEVETIRACETAM 500 MG: 500 TABLET, FILM COATED ORAL at 08:33

## 2025-03-28 RX ADMIN — HYDRALAZINE HYDROCHLORIDE 25 MG: 25 TABLET ORAL at 21:35

## 2025-03-28 RX ADMIN — ACETAMINOPHEN 975 MG: 325 TABLET, FILM COATED ORAL at 21:32

## 2025-03-28 RX ADMIN — LORATADINE 10 MG: 10 TABLET ORAL at 08:33

## 2025-03-28 RX ADMIN — ACETAMINOPHEN 975 MG: 325 TABLET, FILM COATED ORAL at 17:06

## 2025-03-28 RX ADMIN — ACETAMINOPHEN 975 MG: 325 TABLET, FILM COATED ORAL at 04:40

## 2025-03-28 RX ADMIN — NICOTINE 1 PATCH: 21 PATCH, EXTENDED RELEASE TRANSDERMAL at 21:37

## 2025-03-28 RX ADMIN — LEVETIRACETAM 500 MG: 500 TABLET, FILM COATED ORAL at 21:36

## 2025-03-28 ASSESSMENT — ACTIVITIES OF DAILY LIVING (ADL)
ADLS_ACUITY_SCORE: 65
ADLS_ACUITY_SCORE: 73
ADLS_ACUITY_SCORE: 66
ADLS_ACUITY_SCORE: 65
ADLS_ACUITY_SCORE: 73
ADLS_ACUITY_SCORE: 68
ADLS_ACUITY_SCORE: 67
ADLS_ACUITY_SCORE: 72
ADLS_ACUITY_SCORE: 67
ADLS_ACUITY_SCORE: 65
ADLS_ACUITY_SCORE: 67
ADLS_ACUITY_SCORE: 73
ADLS_ACUITY_SCORE: 65
ADLS_ACUITY_SCORE: 67
ADLS_ACUITY_SCORE: 67
ADLS_ACUITY_SCORE: 73
ADLS_ACUITY_SCORE: 73
ADLS_ACUITY_SCORE: 67
ADLS_ACUITY_SCORE: 65
ADLS_ACUITY_SCORE: 73
ADLS_ACUITY_SCORE: 73

## 2025-03-28 NOTE — PLAN OF CARE
Goal Outcome Evaluation:      Plan of Care Reviewed With: patient    Overall Patient Progress: improvingOverall Patient Progress: improving    Outcome Evaluation: [id number]    Oriented to self and situation. Endorses some pain in L arm, given scheduled meds. Denies n/v, SOB. Bed alarm on for pt safety, pt frequently attempting to get out of bed during shift. PIV saline locked. Continue with POC.

## 2025-03-28 NOTE — PROGRESS NOTES
[facility]    Progress Note - Medicine Service, MAROON TEAM 2       Date of Admission:  3/23/2025    Assessment & Plan   Isabell Matthews is a 64 year old female admitted on 3/23/2025. She has a notable history of frequent falls, recurrent UTI, vascular dementia, CVA (most recent 2024), HTN, HLD, CKD IIIb, T1DM c/b neuropathy, and seizures. Was admitted for dizziness and falls, found to have L wrist fx. Workup revealed significant orthostatic hypotension, likely contributing to presentation.     Today's Updates:  - Started hydralazine TID for significant hypertension, but holding PTA long-acting anti-HTN (amlodipine) to accommodate for orthostatic hypotension- tolerating well  - Endocrinology following for insulin management   - Not doing daily labs  - Anticipate discharge in coming days     #Orthostatic hypotension  #Recurrent falls   #Vascular Dementia  Presenting after an unwitnessed fall morning of 3/23. Was found on floor by daughter noting pain in L side and bleeding from face. Non-con CT head without acute intracranial pathology or fracture. No focal neuro deficits. CXR without infectious signs. EKG sinus rhythm without ST or T wave changes. Normal carotid vasculature in 2024.   Noted to have significant orthostatic hypotension: Lyin/81, Sittin/76, 136/79 recheck, Standin/74- potentially etiology of falls.   - Cardiology consulted, appreciate recs   - Stopping long-acting anti-HTN: amlodipine   - Starting short acting regimen w hydralazine:   > 10 mg morning   > 10 mg 8 hours after morning dose    > 25 mg nightly before sleep  - titrate above to response.   - If not tolerated at the starting dose, suggested having risk vs. benefit conversation (increase CVA risk with hypertension vs. Increased fall risk with hypotension).  - PT following:  - recommend discharge to home with family training for transfers and gait.   - Discussed possible higher  level of care, daughter is working with  for possible options  - Telemetry was not tolerated by patient   - Discussed possible Neuro outpatient on discharge, daughter reports following with Neuro    #L wrist fx  #L periorbital Hematoma  2/2 fall as above. Imaging in ED showing acute, comminuted, and impacted fracture of L distal radius. No facial fracture.   - Ortho determined that surgery was not indicated, recommended wearing the brace continuously and following up in a week (3/31)  - Multimodal pain management:    > Scheduled diclofenac gel for wrist and Tylenol  > Lidocaine patches for spot treatment    #Simple cystitis, resolved  Pt has a history of recurrent UTIs. Most recently pan-sensitive E.coli in January 2025. Does not report any sx on admission but UA positive for nitrites and some bacteria. No LE or WBC. S/p 1 dose CTX in ED.   - S/p 3 days abx  - urine culture is not useful as it appears contaminated.     #DM1  #Stress hyperglycemia   A1c of 9.6% on January 2025. BS in 300s on admission. A1c repeated 3/23/25- 8.8%.  Home regimen: Lantus 20u at bedtime + 2u mealtime with additional correction scale.  - Endo consulted, appreciate recs   - Lantus 18 units q24 hrs at 2200   -  Novolog Meal Coverage: 1 units per 15g CHO TID AC and 1:20 PRN with snacks/supplements   -  MDSSI  - BG monitoring: TID AC, HS, 0200  -  Hypoglycemia protocol      #Hypothyroidism  PTA was on 75 mcg levothyroxine daily. Low T4 (0.67) with high TSH (37.30) on admission.   - Increase to 100 mcg levothyroxine daily     #CKD3b  Baseline Cr of 1.5. Cr on admission of 1.5, has remained stable.     #HTN  #HLD  Has been dealing with hypertension as above. Will discontinue amlodipine, carvedilol given significant hypotension as well.   - Continue Atorvastatin 40mg  - Stop Amlodipine 5mg  - Stop Carvedilol 12.5mg BID    #Prior CVAs (ischemic and hemorrhagic)  #Vascular dementia  No focal deficits on exam today.   - Holding ASA  given risk of falls, continue to reevaluate  - Recommend getting MOCA as an outpatient     #H/o seizures  Reported history of seizures iso DKA and strokes. None reported in last 4 years  - Continue Keppra 500mg BID      Diet: Moderate Consistent Carb (60 g CHO per Meal) Diet    DVT Prophylaxis: Pneumatic Compression Devices  Parker Catheter: Not present  Fluids: PO  Lines: None     Cardiac Monitoring: None  Code Status: No CPR- Do NOT Intubate      Clinically Significant Risk Factors                   # Hypertension: Noted on problem list     # Dementia: noted on problem list            # Financial/Environmental Concerns: none         Social Drivers of Health   Food Insecurity: Unknown (3/25/2025)    Food Insecurity     Within the past 12 months, did you worry that your food would run out before you got money to buy more?: Patient unable to answer     Within the past 12 months, did the food you bought just not last and you didn t have money to get more?: Patient unable to answer   Housing Stability: Unknown (3/25/2025)    Housing Stability     Do you have housing? : Patient unable to answer     Are you worried about losing your housing?: Patient unable to answer   Tobacco Use: Medium Risk (3/23/2025)    Patient History     Smoking Tobacco Use: Former     Smokeless Tobacco Use: Never   Financial Resource Strain: Unknown (3/25/2025)    Financial Resource Strain     Within the past 12 months, have you or your family members you live with been unable to get utilities (heat, electricity) when it was really needed?: Patient unable to answer   Transportation Needs: Unknown (3/25/2025)    Transportation Needs     Within the past 12 months, has lack of transportation kept you from medical appointments, getting your medicines, non-medical meetings or appointments, work, or from getting things that you need?: Patient unable to answer   Interpersonal Safety: Unknown (3/25/2025)    Interpersonal Safety     Do you feel physically  and emotionally safe where you currently live?: Patient unable to answer     Within the past 12 months, have you been hit, slapped, kicked or otherwise physically hurt by someone?: Patient unable to answer     Within the past 12 months, have you been humiliated or emotionally abused in other ways by your partner or ex-partner?: Patient unable to answer      Disposition Plan     Medically Ready for Discharge: Anticipated in 2-4 Days       The patient's care was discussed with the Attending Physician, Dr. Granados .    Anthony Moore  Medical Student  Medicine Service, [address]  Securely message with [employer] (more info)  Text page via [facility] Paging/Directory   See signed in provider for up to date coverage information    Resident/Fellow Attestation   I, Rosaura Chen MD, was present with the medical/BULMARO student who participated in the service and in the documentation of the note.  I have verified the history and personally performed the physical exam and medical decision making.  I agree with the assessment and plan of care as documented in the note.      Rosaura Chen MD  PGY2  Date of Service (when I saw the patient): 03/28/25    ____________________________________________________________________  Subjective  Pt reports feeling well- less dizzy when standing up. Pain has been better controlled with lidocaine patches/creams- generally feels that it is better. Discussed questions and concerns. Daughter updated via phone.     Physical Exam   Vital Signs: Temp: 97.8  F (36.6  C) Temp src: Oral BP: (!) 154/79 Pulse: 87   Resp: 18 SpO2: 99 % O2 Device: None (Room air)    Weight: 137 lbs 9.07 oz    General: Sitting in bed comfortably. NAD. Well developed and nourished.  HEENT: NC, MMM, EOMI with 1 mm pupils bilaterally, clear conjunctivae, normal oropharynx. Left periorbital swelling and purple and yellowing ecchymosis.   Cardiovascular: RRR, No murmurs, rubs, or  gallops. Normal S1 and S2.  Pulmonary: CTAB, No wheezes/rhonchi/rales, Normal inspiratory effort.  Abdominal: Soft, non-distended, non-tender. Normal bowel sounds.  Extremities: Left wrist pain with minimal movement. Bilateral hip pain with right hip bruising. Yellowing ecchymosis present on the proximal shin.  Skin: Clean, warm, dry, intact  Neuro: A&O to self, and time. Not to place. Speech is slow and disjointed. CN II-XII grossly intact. Moving all extremities.      Medical Decision Making         Data   Imaging results reviewed over the past 24 hrs:   No results found for this or any previous visit (from the past 24 hours).

## 2025-03-28 NOTE — PLAN OF CARE
"Goal Outcome Evaluation:    Plan of Care Reviewed With: patient  Overall Patient Progress: no changeOverall Patient Progress: no change       [id number]    BP (!) 162/95   Pulse 94   Temp 98  F (36.7  C) (Oral)   Resp 18   Ht 1.6 m (5' 2.99\")   Wt 62.4 kg (137 lb 9.1 oz)   SpO2 98%   BMI 24.38 kg/m      Pain remains on left side of body, mainly her hand/wrist and head, using scheduled Tylenol, Voltaren gel and Lidocaine patch with some effect. Does not like the sling, but is okay with the wrist brace.     Hypertensive, not within parameters for notify. Did drop to 122/68 when standing for her orthostatic BP, asymptomatic, MD made aware. Alert and Oriented x2-3, confused about place. Sometimes able to state she is here d/t her fracture. Denies nausea and SOB. Eating well. Blood glucose trending up w/ meals, covering per MAR. No BM this shift, passing flatus. Int stress incontinence, not using purewick during the day to promote ambulation. Up in the chair x2. Bed alarm on, does not call out for help. Continue with POC.   "

## 2025-03-28 NOTE — PROGRESS NOTES
Inpatient Diabetes Management Service: Daily Progress Note     HPI: Isabell Matthews is a 64 year old female admitted on 3/23/2025. She has a notable history of frequent falls, recurrent UTI, vascular dementia, CVA (most recent 06/2024), HTN, HLD, CKD IIIb, T1DM c/b neuropathy, and seizures. Was admitted for dizziness and falls, found to have L wrist fx. Remains admitted for further workup and surgical consultation. Inpatient Diabetes Service consulted for glycemicmangement.             Assessment/Plan:     Assessment:   Type 1  Diabetes Mellitus with DEMETRIA elevated at 6.2 and C-peptide=<0.1 on 6/17/2024, complicated by peripheral neuropathy, nephropathy, hx of DKA and hypoglycemia. Sub-optimal control (A1c 8.8 % 3/23/25, Hgb: 10.5)  Dementia - daughter is caregiver and manages diabetes for patient.   Stress hyperglycemia    Plan/Recommendations:   - Lantus 18 units q 24 hrs at 2200  - Novolog Meal Coverage: 1 unit per 15 grams CHO, TID AC and 1:20g CHO PRN with snacks/supplements  - Novolog Correction Scale: medium resistance (ISF: 50) TID AC / HS / 0200   - BG Monitoring: TID AC, HS, 0200  - Hypoglycemia protocol  - Carb counting protocol     Discussion:   BG to 239 from 184 after Bfast and receiving novolog for carbs.  May need adjustment in novolog meal time coverage, but will trend BG to see. BG to 143 at 0200, keep lantus dose the same. Will possibly discharge 3/31/25 to home.    TENTATIVE Diabetes Management Discharge Plan  Instructions to patient were posted in AVS and discussed on day of discharge.   Medications and supplies are to be ordered by primary service on discharge.   *please use the DIAB non-branded discharge supply order set ([id number])*     Patient will need the following supplies prescribed:   none     Blood glucose monitoring: Three times daily before meals, and at bedtime.  Blood Glucose (BG) goals: 100-180 mg/dL before meals, less than 250 mg/dL 2 hrs after meals.       Glucose Control Regimen:  1) Long-acting insulin: glargine (Lantus) - take 18 units once daily at bedtime. Take at same time each day.  If there is a low BG overnight x2 during the week, decrease this dose by 2 units.     2) Rapid-acting insulin: lispro (Humalog) carbohydrate coverage/mealtime insulin -   At Home:  take 1 unit per 15 grams of carbohydrates before meals and 1 unit per 20 grams of carbohydrates for snacks.  At Day Center:  Take 2 units before meals     4) Rapid-acting insulin: lispro (Humalog) correction - see chart below. Take for high blood glucoses three times daily before meals and at bedtime.  You may add the correction dose to the carbohydrate coverage/mealtime insulin dose and give in one injection--ideally 10-15 minutes before a meal.  You may take the correction dose even if you skip a meal (as long as it has been 4 hours since previous correction dose).      Blood Glucose At Meals, Add At Bedtime, Take   Less than 150 None None   150-199 1 unit None   200-249 2 units 1 unit   250-299 3 units 2 units   300-349 4 units 3 units   350-399 5 units 4 units   400-449 6 units 5 units   450 or greater 7 units 6 units         Outpatient Follow-Up: We recommend scheduling an outpatient diabetes appointment 2-4 weeks from discharge (request placed in discharge navigator on 3/28/25). You can call the [facility] at [phone] if you have scheduling questions or do not hear from them within a few days of discharge. Follow up sooner if blood glucose runs consistently greater than 200 mg/dL or if having more than two episodes less than 70 mg/dL.     If you have urgent questions or concerns regarding your blood sugars or insulin, you may contact [phone] (the main hospital ). Ask to speak with the Endocrinologist on call.     Your target A1c value is less than 8% to help prevent future complications from diabetes. Your most recent A1c is 8.8%.     Please notify Inpatient Diabetes  "Service if changes are planned to steroids, nutrition, TPN/TF and anticipated procedures requiring prolonged NPO status.         Interval History/Review of Systems :   The last 24 hours progress and nursing notes reviewed.  Patient eating well.  No family in room today.  Cardiology adjusting BP meds. May discharge to home 3/31/25.    Planned Procedures/Surgeries: none    Inpatient Glucose Control:       Recent Labs   Lab 03/28/25  0232 03/27/25  2140 03/27/25  1748 03/27/25  1321 03/27/25  0938 03/27/25  0201   * 184* 265* 179* 196* 389*             Medications Impacting Glycemia:   Steroids:  none   D5W-containing solutions/medications: none   Other medications impacting glucose: none         Nutrition:   Orders Placed This Encounter      Moderate Consistent Carb (60 g CHO per Meal) Diet    Supplements: none   TF: none   TPN: none         Diabetes History: see full consult note for complete diabetes history   Diabetes Type and Duration: DM for > 40 years, diagnosed in her 40s. Per daughter,  initially told she has T2DM then T1DM for a while then flipped back to being told T2DM.      6/17/2024 positive GAD65 antibody and c-peptide <0.1 but BG elevated       PTA Medication Regimen:   - Lantus 20 units at night   - Novolog 2 units before meals   - Novolog correction 1:50>150 TID AC    Glucose monitoring device/frequency/trends: glucometer before meals and at bedtime. Has tried and failed multiple CGMs (issues with accuracy or patient picking CGM off).   Hypoglycemia PTA:   - Frequency: 2x per month (in the morning)  - Severity: + history of severe unconscious lows   - Awareness: variable; will go make    - Treatment: 15g cho      Outpatient Diabetes Provider: PCP - Bony Castaneda   Formal Diabetes Education/Educator: Rohini Villa, Pharm D (LOV 3/20/25)           Physical Exam:   BP (!) 184/82   Pulse 87   Temp 97.8  F (36.6  C) (Oral)   Resp 16   Ht 1.6 m (5' 2.99\")   Wt 62.4 kg (137 lb 9.1 oz)   " SpO2 97%   BMI 24.38 kg/m    General:  well appearing, in no acute distress. Sitting in chair  Lungs:  unremarkable, no new cough, no SOB  MSK:   moving all extremities  Mental Status:  Alert and disoriented  Psych:   Cooperative, good eye contact, full/appropriate affect           Data:     Lab Results   Component Value Date    A1C 8.8 (H) 03/23/2025    A1C 9.6 (H) 01/16/2025    A1C 11.2 (H) 09/23/2024    A1C 10.2 (H) 05/20/2024    A1C 10.7 (H) 01/29/2024    A1C 7.8 (H) 06/21/2021    A1C 11.7 (H) 09/27/2020       ROUTINE IP LABS (Last four results)  BMP  Recent Labs   Lab 03/28/25  0232 03/27/25  2140 03/27/25  1748 03/27/25  1321 03/26/25  0851 03/26/25  0846 03/25/25  1249 03/25/25  1118 03/24/25  0836 03/24/25  0743 03/23/25  1608 03/23/25  1017   NA  --   --   --   --   --  135  --  139  --  138  --  140   POTASSIUM  --   --   --   --   --  4.3  --  4.3  --  4.2  --  4.9   CHLORIDE  --   --   --   --   --  100  --  105  --  103  --  106   VERONICA  --   --   --   --   --  9.2  --  9.2  --  9.1  --  9.3   CO2  --   --   --   --   --  24  --  22  --  23  --  20*   BUN  --   --   --   --   --  27.6*  --  26.0*  --  35.4*  --  45.0*   CR  --   --   --   --   --  1.52*  --  1.42*  --  1.46*  --  1.54*   * 184* 265* 179*   < > 392*   < > 212*   < > 315*   < > 345*    < > = values in this interval not displayed.     CBC  Recent Labs   Lab 03/26/25  0846 03/25/25  1118 03/24/25  0743 03/23/25  1017   WBC 5.6 5.9 6.7 5.5   RBC 4.12 3.98 3.74* 3.78*   HGB 11.3* 11.1* 10.5* 10.5*   HCT 35.5 35.3 33.3* 33.6*   MCV 86 89 89 89   MCH 27.4 27.9 28.1 27.8   MCHC 31.8 31.4* 31.5 31.3*   RDW 14.4 14.5 14.5 14.6    206 191 185     INRNo lab results found in last 7 days.    Inpatient Diabetes Service will continue to follow, please don't hesitate to contact the team with any questions or concerns.     PEGGY Granados CNP    Plan discussed with patient, and primary team via this note.    To contact Inpatient  Diabetes Service:     7 AM - 5 PM: Page the IDS BULMARO following the patient that day (see filed or incomplete progress notes/consult notes under Endocrinology)    OR if uncertain of provider assignment: page job code [phone]    5 PM - 7 AM: First call after hours is to primary service.    For urgent after-hours questions, page job code for on call fellow: [phone]     I spent a total of 50 minutes on the date of the encounter doing prep/post-work, chart review, history and exam, documentation and further activities per the note including lab review, multidisciplinary communication, counseling the patient and/or coordinating care regarding acute hyper/hypoglycemic management, as well as discharge management and planning/communication.

## 2025-03-28 NOTE — PROGRESS NOTES
Care Management Follow Up    Length of Stay (days): 5    Expected Discharge Date: 3/31/2024     Concerns to be Addressed: discharge planning     Patient plan of care discussed at interdisciplinary rounds: Yes  Anticipated Discharge Disposition:  home    Anticipated Discharge Services:  home care: SN, PT, OT  Anticipated Discharge DME: Other (see comment) ([facility] [employer])    Patient/family educated on Medicare website which has current facility and service quality ratings: yes  Education Provided on the Discharge Plan:  yes  Patient/Family in Agreement with the Plan: yes    Referrals Placed by CM/SW: Homecare  Private pay costs discussed: Not applicable    Discussed  Partnership in Safe Discharge Planning  document with patient/family: No     Handoff Completed: No    Additional Information:  RNCC [facility]ison[facility]. Joseline stated this patient's home care order  on . RNCC placed a new home care referral for SN, PT, and OT.     Next Steps:     Follow for HH acceptance  Place HH orders   D-C Ride: w/c transport need to be scheduled  Internal hand off vs external hand off - Ronnie Kellogg RNCC  Casual Float Pool Care Coordinator  Filling in for 5A  5A RNCC phone: [phone]

## 2025-03-29 ENCOUNTER — APPOINTMENT (OUTPATIENT)
Dept: CT IMAGING | Facility: CLINIC | Age: 65
DRG: 563 | End: 2025-03-29
Payer: COMMERCIAL

## 2025-03-29 LAB
ANION GAP SERPL CALCULATED.3IONS-SCNC: 10 MMOL/L (ref 7–15)
BASE EXCESS BLDV CALC-SCNC: 4.3 MMOL/L (ref -3–3)
BUN SERPL-MCNC: 28.9 MG/DL (ref 8–23)
CALCIUM SERPL-MCNC: 9.4 MG/DL (ref 8.8–10.4)
CHLORIDE SERPL-SCNC: 106 MMOL/L (ref 98–107)
CREAT SERPL-MCNC: 1.51 MG/DL (ref 0.51–0.95)
EGFRCR SERPLBLD CKD-EPI 2021: 38 ML/MIN/1.73M2
ERYTHROCYTE [DISTWIDTH] IN BLOOD BY AUTOMATED COUNT: 15.2 % (ref 10–15)
GLUCOSE BLDC GLUCOMTR-MCNC: 117 MG/DL (ref 70–99)
GLUCOSE BLDC GLUCOMTR-MCNC: 137 MG/DL (ref 70–99)
GLUCOSE BLDC GLUCOMTR-MCNC: 162 MG/DL (ref 70–99)
GLUCOSE BLDC GLUCOMTR-MCNC: 165 MG/DL (ref 70–99)
GLUCOSE BLDC GLUCOMTR-MCNC: 168 MG/DL (ref 70–99)
GLUCOSE SERPL-MCNC: 159 MG/DL (ref 70–99)
HCO3 BLDV-SCNC: 30 MMOL/L (ref 21–28)
HCO3 SERPL-SCNC: 26 MMOL/L (ref 22–29)
HCT VFR BLD AUTO: 34.6 % (ref 35–47)
HGB BLD-MCNC: 10.9 G/DL (ref 11.7–15.7)
MCH RBC QN AUTO: 27.5 PG (ref 26.5–33)
MCHC RBC AUTO-ENTMCNC: 31.5 G/DL (ref 31.5–36.5)
MCV RBC AUTO: 87 FL (ref 78–100)
O2/TOTAL GAS SETTING VFR VENT: 21 %
OXYHGB MFR BLDV: 50 % (ref 70–75)
PCO2 BLDV: 48 MM HG (ref 40–50)
PH BLDV: 7.41 [PH] (ref 7.32–7.43)
PLATELET # BLD AUTO: 239 10E3/UL (ref 150–450)
PO2 BLDV: 29 MM HG (ref 25–47)
POTASSIUM SERPL-SCNC: 4.3 MMOL/L (ref 3.4–5.3)
RBC # BLD AUTO: 3.97 10E6/UL (ref 3.8–5.2)
SAO2 % BLDV: 51.2 % (ref 70–75)
SODIUM SERPL-SCNC: 142 MMOL/L (ref 135–145)
WBC # BLD AUTO: 5 10E3/UL (ref 4–11)

## 2025-03-29 PROCEDURE — 36415 COLL VENOUS BLD VENIPUNCTURE: CPT

## 2025-03-29 PROCEDURE — 70450 CT HEAD/BRAIN W/O DYE: CPT | Mod: 26 | Performed by: STUDENT IN AN ORGANIZED HEALTH CARE EDUCATION/TRAINING PROGRAM

## 2025-03-29 PROCEDURE — 99232 SBSQ HOSP IP/OBS MODERATE 35: CPT | Mod: GC | Performed by: STUDENT IN AN ORGANIZED HEALTH CARE EDUCATION/TRAINING PROGRAM

## 2025-03-29 PROCEDURE — 99232 SBSQ HOSP IP/OBS MODERATE 35: CPT | Mod: 24

## 2025-03-29 PROCEDURE — 120N000002 HC R&B MED SURG/OB UMMC

## 2025-03-29 PROCEDURE — 250N000013 HC RX MED GY IP 250 OP 250 PS 637

## 2025-03-29 PROCEDURE — 82310 ASSAY OF CALCIUM: CPT

## 2025-03-29 PROCEDURE — 70450 CT HEAD/BRAIN W/O DYE: CPT

## 2025-03-29 PROCEDURE — 82805 BLOOD GASES W/O2 SATURATION: CPT

## 2025-03-29 PROCEDURE — 85027 COMPLETE CBC AUTOMATED: CPT

## 2025-03-29 PROCEDURE — 80048 BASIC METABOLIC PNL TOTAL CA: CPT

## 2025-03-29 RX ORDER — HYDRALAZINE HYDROCHLORIDE 25 MG/1
25 TABLET, FILM COATED ORAL AT BEDTIME
Qty: 90 TABLET | Refills: 0 | Status: SHIPPED | OUTPATIENT
Start: 2025-03-29

## 2025-03-29 RX ORDER — INSULIN ASPART 100 [IU]/ML
INJECTION, SOLUTION INTRAVENOUS; SUBCUTANEOUS
Qty: 15 ML | Refills: 2 | Status: SHIPPED | OUTPATIENT
Start: 2025-03-29 | End: 2025-03-30

## 2025-03-29 RX ORDER — LEVOTHYROXINE SODIUM 100 UG/1
100 TABLET ORAL
Qty: 60 TABLET | Refills: 1 | Status: SHIPPED | OUTPATIENT
Start: 2025-03-30

## 2025-03-29 RX ORDER — LIDOCAINE 4 G/G
1 PATCH TOPICAL EVERY 24 HOURS
Qty: 20 PATCH | Refills: 0 | Status: SHIPPED | OUTPATIENT
Start: 2025-03-29

## 2025-03-29 RX ORDER — HYDRALAZINE HYDROCHLORIDE 10 MG/1
10 TABLET, FILM COATED ORAL 2 TIMES DAILY
Qty: 180 TABLET | Refills: 0 | Status: SHIPPED | OUTPATIENT
Start: 2025-03-29

## 2025-03-29 RX ADMIN — ACETAMINOPHEN 975 MG: 325 TABLET, FILM COATED ORAL at 15:58

## 2025-03-29 RX ADMIN — NICOTINE 1 PATCH: 21 PATCH, EXTENDED RELEASE TRANSDERMAL at 20:29

## 2025-03-29 RX ADMIN — HYDRALAZINE HYDROCHLORIDE 10 MG: 10 TABLET ORAL at 15:58

## 2025-03-29 RX ADMIN — ACETAMINOPHEN 975 MG: 325 TABLET, FILM COATED ORAL at 10:46

## 2025-03-29 RX ADMIN — LORATADINE 10 MG: 10 TABLET ORAL at 10:45

## 2025-03-29 RX ADMIN — ACETAMINOPHEN 975 MG: 325 TABLET, FILM COATED ORAL at 04:13

## 2025-03-29 RX ADMIN — ATORVASTATIN CALCIUM 40 MG: 20 TABLET, FILM COATED ORAL at 10:46

## 2025-03-29 RX ADMIN — LEVOTHYROXINE SODIUM 100 MCG: 0.05 TABLET ORAL at 10:46

## 2025-03-29 RX ADMIN — HYDRALAZINE HYDROCHLORIDE 10 MG: 10 TABLET ORAL at 10:46

## 2025-03-29 RX ADMIN — LEVETIRACETAM 500 MG: 500 TABLET, FILM COATED ORAL at 20:26

## 2025-03-29 RX ADMIN — LEVETIRACETAM 500 MG: 500 TABLET, FILM COATED ORAL at 10:45

## 2025-03-29 RX ADMIN — HYDRALAZINE HYDROCHLORIDE 25 MG: 25 TABLET ORAL at 22:29

## 2025-03-29 RX ADMIN — CYANOCOBALAMIN TAB 500 MCG 1000 MCG: 500 TAB at 10:46

## 2025-03-29 RX ADMIN — DULOXETINE HYDROCHLORIDE 20 MG: 20 CAPSULE, DELAYED RELEASE ORAL at 10:46

## 2025-03-29 RX ADMIN — ACETAMINOPHEN 975 MG: 325 TABLET, FILM COATED ORAL at 22:29

## 2025-03-29 ASSESSMENT — ACTIVITIES OF DAILY LIVING (ADL)
ADLS_ACUITY_SCORE: 67
ADLS_ACUITY_SCORE: 66
ADLS_ACUITY_SCORE: 66
ADLS_ACUITY_SCORE: 67
ADLS_ACUITY_SCORE: 66
ADLS_ACUITY_SCORE: 67
ADLS_ACUITY_SCORE: 67
ADLS_ACUITY_SCORE: 66
ADLS_ACUITY_SCORE: 66
ADLS_ACUITY_SCORE: 67
ADLS_ACUITY_SCORE: 66
ADLS_ACUITY_SCORE: 66

## 2025-03-29 NOTE — PLAN OF CARE
Goal Outcome Evaluation:      Plan of Care Reviewed With: patient    Overall Patient Progress: no changeOverall Patient Progress: no change      VSS. Unable to obtain orthostatic BP due to pt lethragy today. Lethargic and difficulty arousing pt all day. slow to respond to questions. Providers aware. Neuros in tact. Disoriented to time and situation. L wrist brace and compression stockings in place. Incontinent, purewick in place. Continue with POC.

## 2025-03-29 NOTE — DISCHARGE SUMMARY
[facility]  Discharge Summary - Medicine & Pediatrics       Date of Admission:  3/23/2025  Date of Discharge:  3/30/2025  Discharging Provider: Dr. Aly Granados  Discharge Service: Medicine Service, INDER TEAM 2    Discharge Diagnoses   #Orthostatic hypotension  #Recurrent falls   #Vascular Dementia  #L wrist fx  #L periorbital Hematoma  #Simple cystitis, resolved   #DM1  #Stress hyperglycemia   #Hypothyroidism   #CKD3b   #HTN  #HLD  #Prior CVAs (ischemic and hemorrhagic)  #H/o seizures     Clinically Significant Risk Factors          Follow-ups Needed After Discharge   Follow-up Appointments       Follow Up ([facility])      Follow up with [facility] Endocrinology at the [place] clinic in 1-2 weeks for diabetes management/post hospital discharge      Appointments on [place] and/or [facility] (with [facility] or [facility] provider or service). Call [phone] if you haven't heard regarding these appointments within 7 days of discharge.        Follow Up ([facility])      Follow up with primary care provider, Ronnie Kellogg, within 7 days for blood pressure follow up.   Follow up in endocrinology diabetes clinic within 2 weeks (referral placed)     Appointments on [place] and/or [facility] (with [facility] or [facility] provider or service). Call [phone] if you haven't heard regarding these appointments within 7 days of discharge.              Discharge Disposition   Discharged to home  Condition at discharge: Stable    Hospital Course   Isabell Matthews is a 64 year old female admitted on 3/23/2025. She has a notable history of frequent falls, recurrent UTI, vascular dementia, CVA (most recent 06/2024), HTN, HLD, CKD IIIb, T1DM c/b neuropathy, and seizures. Was admitted for dizziness and falls, found to have L wrist fx. Workup revealed significant orthostatic hypotension, likely responsible for presentation along with hypertension. Cardiology consulted, switched to hydralazine  for shorter acting action with improvement in orthostasis and HTN management.     #Orthostatic hypotension  #Recurrent falls   #Vascular Dementia  Presenting after an unwitnessed fall morning of 3/23. Was found on floor by daughter noting pain in L side and bleeding from face. Non-con CT head without acute intracranial pathology or fracture. No focal neuro deficits. CXR without infectious signs. EKG sinus rhythm without ST or T wave changes. Normal carotid vasculature in 2024. Noted to have significant orthostatic hypotension: Lyin/81, Sittin/76 and 136/79 on recheck, Standin/74- most likely etiology of falls. Very difficult to manage her orthostatic hypotension given her underlying hypertension at baseline and etiology is likely baroreceptor dysfunction as it does not improve with fluids. Trial of abdominal binder and compression stockings did not improve orthostatics. Discussed with cardiology given challenging management to balance hypotension and underlying hypertension. Decided on using a short-acting med to prevent longer acting effects of antihypertensives if she is hypotensive. This was used in the hospital and she tolerated well. Recommend follow up with PCP and monitoring of blood pressure and can consider up titration of hydralazine to target blood pressures. Discussed at length with daughter this difficult balance and she understands.   - Cardiology consulted, plan below:              - Stopped long-acting anti-HTN: amlodipine and coreg               - Started short acting regimen w hydralazine:   > 10 mg morning              > 10 mg 8 hours after morning dose               > 25 mg nightly before sleep  - PCP follow up blood pressures, can consider increasing hydralazine if blood pressures tolerate      #L wrist fx  #L periorbital Hematoma  2/2 fall as above. Imaging in ED showed acute, comminuted, and impacted fracture of L distal radius. No facial fracture. Ortho determined that  surgery was not indicated, recommended wearing the brace continuously and following up in a week. She is scheduled for 4/1/25 follow up.     #Simple cystitis   Pt has a history of recurrent UTIs. Most recently pan-sensitive E.coli in January 2025. Does not report any sx on admission but UA positive for nitrites and some bacteria. No LE or WBC. S/p 1 dose CTX in ED. S/p 3 days abx, which were ultimately d/c'd as urine culture was contaminated. No evidence of systemic infection.      #DM1  #Stress hyperglycemia   A1c of 9.6% on January 2025. BS in 300s on admission. A1c repeated 3/23/25- 8.8%.  Home regimen: Lantus 20u at bedtime + 2u mealtime with additional correction scale.  - Endo consulted, plan as below:  - Lantus 18 units 24 hrs at 2200  - Novolog Meal Coverage:1 unit per 15g carbs   - Novolog Correction Scale: medium resistance (ISF: 50) TID AC / HS / 0200      #Hypothyroidism  PTA was on 75 mcg levothyroxine daily. Low T4 (0.67) with high TSH (37.30) on admission. Increased to 100 mcg levothyroxine daily.   - PCP follow up with recheck of TSH in 4-6 weeks       #CKD3b  Baseline Cr of 1.5. Cr on admission of 1.5, has remained stable.      #HTN  #HLD  Has been dealing with hypertension as above. Will discontinue amlodipine, carvedilol given significant hypotension as well.   - Continue Atorvastatin 40mg  - Stop Amlodipine 5mg  - Stop Carvedilol 12.5mg BID     #Prior CVAs (ischemic and hemorrhagic)  #Vascular dementia  No focal deficits on exam today. Held ASA given risk of falls during admission. Resumed on discharge.  - Recommend getting MOCA as an outpatient      #H/o seizures  Reported history of seizures iso DKA and strokes. None reported in last 4 years.Continued Keppra 500mg BID     Consultations This Hospital Stay   ORTHOPAEDIC SURGERY ADULT/PEDS IP CONSULT  PHYSICAL THERAPY ADULT IP CONSULT  OCCUPATIONAL THERAPY ADULT IP CONSULT  CARE MANAGEMENT / SOCIAL WORK IP CONSULT  NURSING TO CONSULT FOR VASCULAR  ACCESS CARE IP CONSULT  ENDOCRINE DIABETES ADULT IP CONSULT  CARDIOLOGY GENERAL ADULT IP CONSULT  NURSING TO CONSULT FOR VASCULAR ACCESS CARE IP CONSULT    Code Status   No CPR- Do NOT Intubate       The patient was discussed with Dr. Granados.    Aly Granados MD  [facility] 2 Service  [facility] 5A ONCOLOGY  [address]  [place] [address]  Phone: [phone]    Physician Attestation   I saw and evaluated this patient prior to discharge.  I discussed the patient with the resident/fellow and agree with plan of care as documented in the note.      I personally reviewed vital signs, medications, and labs.    I personally spent 20 minutes on discharge activities.    Aly Granados MD  Date of Service (when I saw the patient): 03/30/25   ______________________________________________________________________    Physical Exam   Vital Signs: Temp: 97.4  F (36.3  C) Temp src: Oral BP: 135/78 Pulse: 98   Resp: 24 SpO2: 95 % O2 Device: None (Room air)    Weight: 137 lbs 9.07 oz    General: Sitting in bed comfortably. NAD. Well developed and nourished.  HEENT: NC, MMM, EOMI with 1 mm pupils bilaterally, clear conjunctivae, normal oropharynx. Left periorbital swelling and purple and yellowing ecchymosis.   Cardiovascular: RRR, No murmurs, rubs, or gallops. Normal S1 and S2.  Pulmonary: CTAB, No wheezes/rhonchi/rales, Normal inspiratory effort.  Abdominal: Soft, non-distended, non-tender. Normal bowel sounds.  Skin: Clean, warm, dry, intact  Neuro: A&O to self and place. CN II-XII grossly intact. Moving all extremities.           Primary Care Physician   Ronnie Kellogg    Discharge Orders      Primary Care - Care Coordination Referral      Physical Therapy  Referral      Occupational Therapy  Referral      Adult Endocrinology  Referral      Primary Care - Care Coordination Referral      Home Care Referral      Primary Care Referral      Follow Up ([facility])    Follow up with [facility]  Endocrinology at the [facility] [place] [place] [facility] in 1-2 weeks for diabetes management/post hospital discharge      Appointments on [place] and/or [facility] (with [facility] or [facility] provider or service). Call [phone] if you haven't heard regarding these appointments within 7 days of discharge.     Reason for your hospital stay    You were admitted to [facility] for fall, and were found to have orthostatic hypotension.  While here you were started on an alternative blood pressure medication called hydralazine.  Please take this medication as a substitute of the carvedilol and amlodipine you are taking before to improve your orthostatic hypotension.  Please follow-up with your PCP in the next week or so to make further adjustments in your hydralazine dosage based on your blood pressures.     Activity    Your activity upon discharge: activity as tolerated     Follow Up ([facility])    Follow up with primary care provider, Ronnie Kellogg, within 7 days for blood pressure follow up.   Follow up in endocrinology diabetes clinic within 2 weeks (referral placed)     Appointments on [place] and/or [facility] (with [facility] or [facility] provider or service). Call [phone] if you haven't heard regarding these appointments within 7 days of discharge.     Diet    Follow this diet upon discharge: Current Diet:Orders Placed This Encounter      Moderate Consistent Carb (60 g CHO per Meal) Diet       Significant Results and Procedures   Results for orders placed or performed during the hospital encounter of 03/23/25   CT Head w/o Contrast    Narrative    EXAM: CT HEAD W/O CONTRAST  3/23/2025 10:45 AM     HISTORY: fall, r/o traumatic injury       COMPARISON: Head CT 1/17/2025. Brain MRI 6/16/2024.    TECHNIQUE: Using multidetector thin collimation helical acquisition  technique, axial, coronal and sagittal CT images from the skull base  to the vertex were obtained without intravenous contrast.   (topogram) image(s) also  obtained and reviewed.    FINDINGS:  No acute intracranial hemorrhage, mass effect, or midline shift.  Hypodensities in the bilateral thalami and basal ganglia, unchanged.  Diffuse periventricular white matter hypoattenuation. Moderate diffuse  cerebral and cerebellar volume loss. No acute loss of gray-white  matter differentiation in the cerebral hemispheres. Ventricles are  proportionate to the cerebral sulci. Clear basal cisterns. Small  locules of dural fat.    Left periorbital contusion with small skin defect. The bony calvaria  and the bones of the skull base are normal. Moderate polypoid left  maxillary sinus mucosal thickening. Mastoid air cells are clear.  Bilateral pseudophakia.      Impression    IMPRESSION:   1. No acute intracranial pathology.   2. Left periorbital soft tissue contusion without underlying fracture.  3. Moderate cerebral atrophy, sequelae of chronic small vessel  ischemic disease, and old lacunar infarcts.    I have personally reviewed the examination and initial interpretation  and I agree with the findings.    GREG QUEVEDO MD         SYSTEM ID:  [id number]   CT Cervical Spine w/o Contrast    Narrative    EXAM: CT CERVICAL SPINE W/O CONTRAST  3/23/2025 10:44 AM     HISTORY: fall, r/o traumatic injury       COMPARISON: Cervical spine CT 1/17/2025    TECHNIQUE: Using multidetector thin collimation helical acquisition  technique, axial, coronal and sagittal CT images through the cervical  spine were obtained without intravenous contrast.    FINDINGS:  Grade 1 anterolisthesis of C3 on C4 and C7 on T1. No acute fracture or  traumatic subluxation. No prevertebral edema. Multilevel  intervertebral disc height loss. Multilevel endplate osteophytosis,  uncinate hypertrophy, and bilateral facet arthropathy. Mild to  moderate associated spinal canal narrowing at C4-5 and C5-6. Moderate  neural foraminal narrowing on the right at C3-4, on the right at C4-5,  bilaterally at C5-6, and on the left at  C6-7.     No abnormality of the paraspinous soft tissues. Bilateral carotid  atherosclerosis.    Mosaic attenuation of the lung parenchyma, likely related to degree of  inspiration.      Impression    IMPRESSION:  1. No acute fracture or traumatic subluxation.  2. Multilevel cervical spondylosis, most pronounced at C4-5 and C5-6.  No high-grade spinal canal or neuroforaminal stenosis.    I have personally reviewed the examination and initial interpretation  and I agree with the findings.    GREG QUEVEDO MD         SYSTEM ID:  [id number]   XR Chest 2 Views    Narrative    EXAM: XR CHEST 2 VIEWS  3/23/2025 11:36 AM     HISTORY:  fall, r/o traumatic injury       COMPARISON:  CT chest abdomen and pelvis 1/16/2025    FINDINGS:     AP and lateral upright views of the chest.    Trachea is midline. Cardiomediastinal silhouette is slightly enlarged.  No focal airspace opacity, pleural effusion or appreciable  pneumothorax.    No acute osseous abnormality. Visualized upper abdomen is  unremarkable.  Soft tissues within normal limits      Impression    IMPRESSION:   1. No acute traumatic injury visualized.  2. No acute cardiopulmonary disease.     I have personally reviewed the examination and initial interpretation  and I agree with the findings.    SANDY BRYANT MD         SYSTEM ID:  [id number]   Thoracic spine XR, 3 views    Narrative    EXAM: XR THORACIC SPINE 3 VIEWS  LOCATION: [facility]  DATE: 3/23/2025    INDICATION: fall, r o traumatic injury  COMPARISON: 01/16/2025      Impression    IMPRESSION: The vertebral body heights of the thoracic spine appear preserved on the anterior projection but are not diagnostically observed on the lateral projection. Lung volumes are low. Soft tissues of the upper abdomen are unremarkable.   Lumbar spine XR, 2-3 views    Narrative    EXAM: XR LUMBAR SPINE 2/3 VIEWS  LOCATION: [facility]  DATE:  3/23/2025    INDICATION: fall, r o traumatic injury  COMPARISON: 01/16/2025      Impression    IMPRESSION: Normal vertebral heights and alignment. No severe disc height loss or facet arthropathy. Atherosclerotic calcifications of the abdominal aorta. Bilateral hip arthroplasty.   CT Facial Bones without Contrast    Narrative    EXAM: CT FACIAL BONES WITHOUT CONTRAST  3/23/2025 10:45 AM     HISTORY: fall, r/o traumatic injury       COMPARISON: Same day head CT. 1/17/2025      TECHNIQUE: Using thin collimation multidetector helical acquisition  technique, axial, sagittal, and coronal thin section CT images were  reconstructed through the facial bones. Images were reviewed in bone  and soft tissue windows.    FINDINGS:   Left superior periorbital hematoma with soft tissue swelling at its  periphery. Additionally there is mild left buccal region soft tissue  swelling. No underlying fracture is suspected. No post septal  abnormality. Intact bony alignment. Intact cribriform plate. Bilateral  pseudophakia. Otherwise normal orbital structures.    Moderate polypoid left maxillary sinus mucosal thickening. Minimal  right maxillary sinus mucosal thickening. Mild ethmoid mucosal  thickening. Right sphenoid mucosal thickening and frothy debris.  Mastoid air cells clear. Debris in the right external auditory canal,  presumed cerumen.    Extensive dental caries. Small periapical lucencies involving several  mandibular and maxillary teeth.     Cerebral and cerebellar atrophy.      Impression    IMPRESSION:   1. Left periorbital hematoma.   2. No acute facial bone fracture.    I have personally reviewed the examination and initial interpretation  and I agree with the findings.    GREG QUEVEDO MD         SYSTEM ID:  [id number]   XR Hand Left G/E 3 Views    Narrative    EXAM: XR FOREARM LEFT 2 VIEWS, XR ELBOW LEFT 2 VIEWS, XR WRIST LEFT G/E 3 VIEWS, XR HAND LEFT G/E 3 VIEWS  LOCATION: [facility]  [facility]  DATE: 3/23/2025    INDICATION: fall, r o traumatic injury  COMPARISON: None.      Impression    IMPRESSION: Acute, comminuted and impacted fracture of the left distal radius with intra-articular extension to the radiocarpal joint. Dorsal tilt of the distal radial articular surface. Tiny, minimally displaced ulnar styloid tip fracture. Soft tissue   swelling about the wrist.    No additional fracture of the left elbow, forearm, or hand. No elbow joint effusion. Demineralization. Moderate degenerative arthritis first CMC and STT joints.   XR Wrist Left G/E 3 Views    Narrative    EXAM: XR FOREARM LEFT 2 VIEWS, XR ELBOW LEFT 2 VIEWS, XR WRIST LEFT G/E 3 VIEWS, XR HAND LEFT G/E 3 VIEWS  LOCATION: [facility]  DATE: 3/23/2025    INDICATION: fall, r o traumatic injury  COMPARISON: None.      Impression    IMPRESSION: Acute, comminuted and impacted fracture of the left distal radius with intra-articular extension to the radiocarpal joint. Dorsal tilt of the distal radial articular surface. Tiny, minimally displaced ulnar styloid tip fracture. Soft tissue   swelling about the wrist.    No additional fracture of the left elbow, forearm, or hand. No elbow joint effusion. Demineralization. Moderate degenerative arthritis first CMC and STT joints.   Radius/Ulna XR,  PA &LAT, left    Narrative    EXAM: XR FOREARM LEFT 2 VIEWS, XR ELBOW LEFT 2 VIEWS, XR WRIST LEFT G/E 3 VIEWS, XR HAND LEFT G/E 3 VIEWS  LOCATION: [facility]  DATE: 3/23/2025    INDICATION: fall, r o traumatic injury  COMPARISON: None.      Impression    IMPRESSION: Acute, comminuted and impacted fracture of the left distal radius with intra-articular extension to the radiocarpal joint. Dorsal tilt of the distal radial articular surface. Tiny, minimally displaced ulnar styloid tip fracture. Soft tissue   swelling about the wrist.    No additional fracture of  the left elbow, forearm, or hand. No elbow joint effusion. Demineralization. Moderate degenerative arthritis first CMC and STT joints.   XR Elbow Left 2 Views    Narrative    EXAM: XR FOREARM LEFT 2 VIEWS, XR ELBOW LEFT 2 VIEWS, XR WRIST LEFT G/E 3 VIEWS, XR HAND LEFT G/E 3 VIEWS  LOCATION: [facility]  DATE: 3/23/2025    INDICATION: fall, r o traumatic injury  COMPARISON: None.      Impression    IMPRESSION: Acute, comminuted and impacted fracture of the left distal radius with intra-articular extension to the radiocarpal joint. Dorsal tilt of the distal radial articular surface. Tiny, minimally displaced ulnar styloid tip fracture. Soft tissue   swelling about the wrist.    No additional fracture of the left elbow, forearm, or hand. No elbow joint effusion. Demineralization. Moderate degenerative arthritis first CMC and STT joints.   XR Pelvis w 1 View Each Hip    Narrative    EXAM: XR PELVIS AND HIP BILATERAL 1 VIEW  LOCATION: [facility]  DATE: 3/23/2025    INDICATION: Fall in AM with pain in left hip  COMPARISON: 01/20/2025      Impression    IMPRESSION: Bilateral total hip replacements. No evidence of an acute displaced fracture or dislocation on either side. Bones are demineralized.     Atherosclerotic vascular calcifications.       Discharge Medications   Current Discharge Medication List        START taking these medications    Details   diclofenac (VOLTAREN) 1 % topical gel Apply 2 g topically 4 times daily.  Qty: 150 g, Refills: 2    Associated Diagnoses: Low back pain, unspecified back pain laterality, unspecified chronicity, unspecified whether sciatica present      !! hydrALAZINE (APRESOLINE) 10 MG tablet Take 1 tablet (10 mg) by mouth 2 times daily.  Qty: 180 tablet, Refills: 0    Associated Diagnoses: Fall on same level from slipping, tripping or stumbling, initial encounter; Essential hypertension; Closed  fracture of multiple ribs of right side, initial encounter      !! hydrALAZINE (APRESOLINE) 25 MG tablet Take 1 tablet (25 mg) by mouth at bedtime.  Qty: 90 tablet, Refills: 0    Associated Diagnoses: Fall on same level from slipping, tripping or stumbling, initial encounter; Closed fracture of multiple ribs of right side, initial encounter      !! insulin aspart (NOVOLOG FLEXPEN) 100 UNIT/ML pen Novolog Flexpen: 1 unit per 15 grams of carbohydrates with meals  Qty: 15 mL, Refills: 2    Associated Diagnoses: Type 1 diabetes mellitus with other circulatory complication (H)      Lidocaine (LIDOCARE) 4 % Patch Place 1 patch over 12 hours onto the skin every 24 hours. To prevent lidocaine toxicity, patient should be patch free for 12 hrs daily.  Qty: 20 patch, Refills: 0    Associated Diagnoses: Low back pain, unspecified back pain laterality, unspecified chronicity, unspecified whether sciatica present       !! - Potential duplicate medications found. Please discuss with provider.        CONTINUE these medications which have CHANGED    Details   insulin glargine (LANTUS PEN) 100 UNIT/ML pen Inject 18 Units subcutaneously at bedtime.  Qty: 15 mL, Refills: 1    Comments: If Lantus is not covered by insurance, may substitute Basaglar or Semglee or other insulin glargine product per insurance preference at same dose and frequency.    Associated Diagnoses: Type 1 diabetes mellitus with other circulatory complication (H)      levothyroxine (SYNTHROID/LEVOTHROID) 100 MCG tablet Take 1 tablet (100 mcg) by mouth every morning (before breakfast).  Qty: 60 tablet, Refills: 1    Associated Diagnoses: Lethargy           CONTINUE these medications which have NOT CHANGED    Details   acetaminophen (TYLENOL) 325 MG tablet Take 3 tablets (975 mg) by mouth every 8 hours.    Associated Diagnoses: Closed fracture of one rib of right side, initial encounter      aspirin (ASA) 81 MG chewable tablet Take 1 tablet (81 mg) by mouth daily  Qty:  90 tablet, Refills: 3    Associated Diagnoses: Acute CVA (cerebrovascular accident) (H)      atorvastatin (LIPITOR) 40 MG tablet Take 1 tablet (40 mg) by mouth daily.  Qty: 90 tablet, Refills: 3    Associated Diagnoses: Hyperlipidemia LDL goal <70      blood glucose (NO BRAND SPECIFIED) test strip Use to test blood sugar 4 times daily or as directed.  Qty: 360 strip, Refills: 3    Associated Diagnoses: Type 1 diabetes mellitus with diabetic polyneuropathy (H)      blood glucose monitoring (ONE TOUCH ULTRA MINI) meter device kit Use to test blood sugar 4 times daily or as directed.  Qty: 1 kit, Refills: 0    Associated Diagnoses: Type 1 diabetes mellitus with diabetic polyneuropathy (H)      chlorhexidine (PERIDEX) 0.12 % solution Take 30 mLs by mouth daily. Swish and spit 30 mL once daily for 14 days.Swish and spit 30 mL once daily for 14 days.  Qty: 473 mL, Refills: 3    Associated Diagnoses: Gingivitis      Continuous Glucose Sensor (FREESTYLE MARIA FERNANDA 2 SENSOR) MISC Change every 14 days.  Qty: 4 each, Refills: 11    Associated Diagnoses: Type 1 diabetes mellitus with other circulatory complication (H)      cyanocobalamin (VITAMIN B-12) 1000 MCG tablet Take 1 tablet (1,000 mcg) by mouth daily  Qty: 100 tablet, Refills: 3    Associated Diagnoses: Vitamin B12 deficiency (non anemic)      DULoxetine (CYMBALTA) 20 MG capsule TAKE 1 CAPSULE(20 MG) BY MOUTH DAILY  Qty: 90 capsule, Refills: 3    Associated Diagnoses: Fibromyalgia      gabapentin (NEURONTIN) 100 MG capsule Take 1 capsule (100 mg) by mouth at bedtime.  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 If no response after 15 minutes, additional 1 mg dose from a new device may be injected while waiting for emergency  assistance.  Qty: 2 each, Refills: 1    Associated Diagnoses: Type 1 diabetes mellitus with other circulatory complication (H)      !! insulin aspart (NOVOLOG FLEXPEN) 100 UNIT/ML pen Inject 2 units with meals plus correction scale additional three times daily before meals  as follows  Pre-Meal  - 200 give additional 1 unit.  For Pre-Meal  -250 give 2 additional units.  For Pre-Meal -300 give 3 additional units.  For Pre-Meal -350 give 4 additional units.  For Pre-Meal -400 give 5 additional units Max 25 units daily  Qty: 30 mL, Refills: 4    Associated Diagnoses: Type 1 diabetes mellitus with other circulatory complication (H)      levETIRAcetam (KEPPRA) 500 MG tablet Take 1 tablet (500 mg) by mouth 2 times daily.  Qty: 180 tablet, Refills: 3    Associated Diagnoses: Seizures (H)      loratadine (CLARITIN) 10 MG tablet Take 1 tablet (10 mg) by mouth daily.  Qty: 90 tablet, Refills: 3    Associated Diagnoses: Dermatitis      methocarbamol (ROBAXIN) 500 MG tablet Take 1 tablet (500 mg) by mouth daily as needed for muscle spasms.  Qty: 30 tablet, Refills: 0    Associated Diagnoses: Closed fracture of one rib of right side, initial encounter      PENTIPS 32G X 4 MM miscellaneous Inject 1 each subcutaneously daily. Use 4 pen needles daily or as directed.      zinc oxide (DESITIN) 20 % external ointment Apply topically as needed for dry skin or irritation  Qty: 85 g, Refills: 3    Associated Diagnoses: Fecal smearing       !! - Potential duplicate medications found. Please discuss with provider.        STOP taking these medications       amLODIPine (NORVASC) 5 MG tablet Comments:   Reason for Stopping:         carvedilol (COREG) 12.5 MG tablet Comments:   Reason for Stopping:             Allergies   Allergies   Allergen Reactions    Seasonal Allergies

## 2025-03-29 NOTE — PROGRESS NOTES
[facility]    Progress Note - Medicine Service, [facility] TEAM 2       Date of Admission:  3/23/2025    Assessment & Plan   Isabell Matthews is a 64 year old female admitted on 3/23/2025. She has a notable history of frequent falls, recurrent UTI, vascular dementia, CVA (most recent 2024), HTN, HLD, CKD IIIb, T1DM c/b neuropathy, and seizures. Was admitted for dizziness and falls, found to have L wrist fx. Workup revealed significant orthostatic hypotension, likely contributing to presentation.     Today's Updates:  - Blood pressure better controlled systolic 140s-160s, diastolic 100s; recommend follow up with PCP   - Endocrinology provided tentative discharge plan for management of diabetes, recommended follow up with PCP in 2-4 weeks   - Not doing daily labs  - Anticipate discharge this evening when daughter is available    #Orthostatic hypotension  #Recurrent falls   #Vascular Dementia  Presenting after an unwitnessed fall morning of 3/23. Was found on floor by daughter noting pain in L side and bleeding from face. Non-con CT head without acute intracranial pathology or fracture. No focal neuro deficits. CXR without infectious signs. EKG sinus rhythm without ST or T wave changes. Normal carotid vasculature in 2024.   Noted to have significant orthostatic hypotension: Lyin/81, Sittin/76, 136/79 recheck, Standin/74- potentially etiology of falls.   - Cardiology consulted, appreciate recs   - Stopping long-acting anti-HTN: amlodipine   - Starting short acting regimen w hydralazine:   > 10 mg morning   > 10 mg 8 hours after morning dose    > 25 mg nightly before sleep  - titrate above to response.   - If not tolerated at the starting dose, suggested having risk vs. benefit conversation (increase CVA risk with hypertension vs. Increased fall risk with hypotension).  - PT following:  - recommend discharge to home with family training for transfers and gait.    - Discussed possible higher level of care, daughter is working with  for possible options  - Telemetry was not tolerated by patient   - Discussed possible Neuro outpatient on discharge, daughter reports following with Neuro    #L wrist fx  #L periorbital Hematoma  2/2 fall as above. Imaging in ED showing acute, comminuted, and impacted fracture of L distal radius. No facial fracture.   - Ortho determined that surgery was not indicated, recommended wearing the brace continuously and following up in a week (3/31)  - Multimodal pain management:    > Scheduled diclofenac gel for wrist and Tylenol  > Lidocaine patches for spot treatment    #Simple cystitis, resolved  Pt has a history of recurrent UTIs. Most recently pan-sensitive E.coli in January 2025. Does not report any sx on admission but UA positive for nitrites and some bacteria. No LE or WBC. S/p 1 dose CTX in ED.   - S/p 3 days abx  - urine culture is not useful as it appears contaminated.     #DM1  #Stress hyperglycemia   A1c of 9.6% on January 2025. BS in 300s on admission. A1c repeated 3/23/25- 8.8%.  Home regimen: Lantus 20u at bedtime + 2u mealtime with additional correction scale.  - Endo consulted, appreciate recs   - Lantus 18 units q24 hrs at 2200   -  Novolog Meal Coverage: 1 units per 15g CHO TID AC and 1:20 PRN with snacks/supplements   -  MDSSI  - BG monitoring: TID AC, HS, 0200  -  Hypoglycemia protocol      #Hypothyroidism  PTA was on 75 mcg levothyroxine daily. Low T4 (0.67) with high TSH (37.30) on admission.   - Increase to 100 mcg levothyroxine daily     #CKD3b  Baseline Cr of 1.5. Cr on admission of 1.5, has remained stable.     #HTN  #HLD  Has been dealing with hypertension as above. Will discontinue amlodipine, carvedilol given significant hypotension as well.   - Continue Atorvastatin 40mg  - Stop Amlodipine 5mg  - Stop Carvedilol 12.5mg BID    #Prior CVAs (ischemic and hemorrhagic)  #Vascular dementia  No focal deficits on  exam today.   - Holding ASA given risk of falls, continue to reevaluate  - Recommend getting MOCA as an outpatient     #H/o seizures  Reported history of seizures iso DKA and strokes. None reported in last 4 years  - Continue Keppra 500mg BID      Diet: Moderate Consistent Carb (60 g CHO per Meal) Diet    DVT Prophylaxis: Pneumatic Compression Devices  Parker Catheter: Not present  Fluids: PO  Lines: None     Cardiac Monitoring: None  Code Status: No CPR- Do NOT Intubate      Clinically Significant Risk Factors   { TIP  Diagnoses will continue to appear throughout the admission.  :[id number]}                # Hypertension: Noted on problem list     # Dementia: noted on problem list            # Financial/Environmental Concerns: none         Social Drivers of Health   Food Insecurity: Unknown (3/25/2025)    Food Insecurity     Within the past 12 months, did you worry that your food would run out before you got money to buy more?: Patient unable to answer     Within the past 12 months, did the food you bought just not last and you didn t have money to get more?: Patient unable to answer   Housing Stability: Unknown (3/25/2025)    Housing Stability     Do you have housing? : Patient unable to answer     Are you worried about losing your housing?: Patient unable to answer   Tobacco Use: Medium Risk (3/23/2025)    Patient History     Smoking Tobacco Use: Former     Smokeless Tobacco Use: Never   Financial Resource Strain: Unknown (3/25/2025)    Financial Resource Strain     Within the past 12 months, have you or your family members you live with been unable to get utilities (heat, electricity) when it was really needed?: Patient unable to answer   Transportation Needs: Unknown (3/25/2025)    Transportation Needs     Within the past 12 months, has lack of transportation kept you from medical appointments, getting your medicines, non-medical meetings or appointments, work, or from getting things that you need?: Patient unable  to answer   Interpersonal Safety: Unknown (3/25/2025)    Interpersonal Safety     Do you feel physically and emotionally safe where you currently live?: Patient unable to answer     Within the past 12 months, have you been hit, slapped, kicked or otherwise physically hurt by someone?: Patient unable to answer     Within the past 12 months, have you been humiliated or emotionally abused in other ways by your partner or ex-partner?: Patient unable to answer      Disposition Plan   {TIP  It is advised to update the Medical Readiness for Discharge [MRD] daily, until the patient is 'Ready Now.' Last Documentation- Anticipated in 2-4 Days  . Use the SmartList below to update for today:[id number]}  Medically Ready for Discharge: Anticipated in 2-4 Days       The patient's care was discussed with the Attending Physician, Dr. Granados .    Anthony Moore  Medical Student  Medicine Service, [address]  Securely message with [employer] (more info)  Text page via [facility] Paging/Directory   See signed in provider for up to date coverage information    ____________________________________________________________________  Subjective    Patient reports plan is much better controlled this morning.     Physical Exam   Vital Signs: Temp: 98  F (36.7  C) Temp src: Oral BP: (!) 158/101 Pulse: 98   Resp: 18 SpO2: 97 % O2 Device: None (Room air)    Weight: 137 lbs 9.07 oz    General: Sitting in bed comfortably. NAD. Well developed and nourished.  HEENT: NC, MMM, EOMI with 1 mm pupils bilaterally, clear conjunctivae, normal oropharynx. Left periorbital swelling and purple and yellowing ecchymosis.   Cardiovascular: RRR, No murmurs, rubs, or gallops. Normal S1 and S2.  Pulmonary: CTAB, No wheezes/rhonchi/rales, Normal inspiratory effort.  Abdominal: Soft, non-distended, non-tender. Normal bowel sounds.  Extremities: Left wrist pain with minimal movement. Bilateral hip pain with right hip  bruising. Yellowing ecchymosis present on the proximal shin.  Skin: Clean, warm, dry, intact  Neuro: A&O to self. Not to place or place. Speech is slow and disjointed. CN II-XII grossly intact. Moving all extremities.      Medical Decision Making   { TIP   MDM Calculator    MDM grid (w/ times)    Coding Support Chat  Billing is now based on time OR medical decision making complexity. Medical decision making included in the A&P does NOT need to be re-documented. Documented time is for the billing provider only (not including resident/fellow time).    :[id number]}      Data   Imaging results reviewed over the past 24 hrs:   No results found for this or any previous visit (from the past 24 hours).

## 2025-03-29 NOTE — PROGRESS NOTES
Inpatient Diabetes Management Service: Daily Progress Note     HPI: Isabell Matthews is a 64 year old female admitted on 3/23/2025. She has a notable history of frequent falls, recurrent UTI, vascular dementia, CVA (most recent 06/2024), HTN, HLD, CKD IIIb, T1DM c/b neuropathy, and seizures. Was admitted for dizziness and falls, found to have L wrist fx. Remains admitted for further workup and surgical consultation. Inpatient Diabetes Service consulted for glycemicmangement.             Assessment/Plan:     Assessment:   Type 1  Diabetes Mellitus with DEMETRIA elevated at 6.2 and C-peptide=<0.1 on 6/17/2024, complicated by peripheral neuropathy, nephropathy, hx of DKA and hypoglycemia. Sub-optimal control (A1c 8.8 % 3/23/25, Hgb: 10.5)  Dementia - daughter is caregiver and manages diabetes for patient.   Stress hyperglycemia    Plan/Recommendations:   - Continue Lantus 18 units q 24 hrs at 2200  - Novolog Meal Coverage: 1 unit per 15--> 1 per 12 g CHO TID AC and 1:20g CHO PRN with snacks/supplements  - Novolog Correction Scale: medium resistance (ISF: 50) TID AC / HS / 0200   - BG Monitoring: TID AC, HS, 0200  - Hypoglycemia protocol  - Carb counting protocol     Discussion:    Fasting BG of 137. Hyperglycemia during the day up to 323 following lunch. Will increase her ICR slightly today and monitor trends closely as patient does have lability in glucoses. More lethargic this morning, hard to arouse, but BG WNL.     TENTATIVE Diabetes Management Discharge Plan  Instructions to patient were posted in AVS and discussed on day of discharge.   Medications and supplies are to be ordered by primary service on discharge.   *please use the DIAB non-branded discharge supply order set ([id number])*     Patient will need the following supplies prescribed:   none     Blood glucose monitoring: Three times daily before meals, and at bedtime.  Blood Glucose (BG) goals: 100-180 mg/dL before meals, less than 250 mg/dL  2 hrs after meals.      Glucose Control Regimen:  1) Long-acting insulin: glargine (Lantus) - take 18 units once daily at bedtime. Take at same time each day.  If there is a low BG overnight x2 during the week, decrease this dose by 2 units.     2) Rapid-acting insulin: lispro (Humalog) carbohydrate coverage/mealtime insulin -   At Home:  take 1 unit per 12 grams of carbohydrates before meals and 1 unit per 20 grams of carbohydrates for snacks.  At Day Center:  Take ___ units before meals     4) Rapid-acting insulin: lispro (Humalog) correction - see chart below. Take for high blood glucoses three times daily before meals and at bedtime.  You may add the correction dose to the carbohydrate coverage/mealtime insulin dose and give in one injection--ideally 10-15 minutes before a meal.  You may take the correction dose even if you skip a meal (as long as it has been 4 hours since previous correction dose).      Blood Glucose At Meals, Add At Bedtime, Take   Less than 150 None None   150-199 1 unit None   200-249 2 units 1 unit   250-299 3 units 2 units   300-349 4 units 3 units   350-399 5 units 4 units   400-449 6 units 5 units   450 or greater 7 units 6 units         Outpatient Follow-Up: We recommend scheduling an outpatient diabetes appointment 2-4 weeks from discharge (request placed in discharge navigator on 3/28/25). You can call the [facility] at [phone] if you have scheduling questions or do not hear from them within a few days of discharge. Follow up sooner if blood glucose runs consistently greater than 200 mg/dL or if having more than two episodes less than 70 mg/dL.     If you have urgent questions or concerns regarding your blood sugars or insulin, you may contact [phone] (the main hospital ). Ask to speak with the Endocrinologist on call.     Your target A1c value is less than 8% to help prevent future complications from diabetes. Your most recent A1c is 8.8%.     Please  notify Inpatient Diabetes Service if changes are planned to steroids, nutrition, TPN/TF and anticipated procedures requiring prolonged NPO status.         Interval History/Review of Systems :   The last 24 hours progress and nursing notes reviewed.  Quite somnolent today. Arouses to repeated stimulation. Falls asleep easily.   Poor historian, unclear if snacking outside of meals     Planned Procedures/Surgeries: none    Inpatient Glucose Control:       Recent Labs   Lab 03/29/25  0400 03/28/25  2124 03/28/25  1627 03/28/25  1111 03/28/25  0807 03/28/25  0232   * 188* 323* 239* 184* 143*             Medications Impacting Glycemia:   Steroids:  none   D5W-containing solutions/medications: none   Other medications impacting glucose: none         Nutrition:   Orders Placed This Encounter      Moderate Consistent Carb (60 g CHO per Meal) Diet    Supplements: none   TF: none   TPN: none         Diabetes History: see full consult note for complete diabetes history   Diabetes Type and Duration: DM for > 40 years, diagnosed in her 40s. Per daughter,  initially told she has T2DM then T1DM for a while then flipped back to being told T2DM.      6/17/2024 positive GAD65 antibody and c-peptide <0.1 but BG elevated       PTA Medication Regimen:   - Lantus 20 units at night   - Novolog 2 units before meals   - Novolog correction 1:50>150 TID AC    Glucose monitoring device/frequency/trends: glucometer before meals and at bedtime. Has tried and failed multiple CGMs (issues with accuracy or patient picking CGM off).   Hypoglycemia PTA:   - Frequency: 2x per month (in the morning)  - Severity: + history of severe unconscious lows   - Awareness: variable; will go make    - Treatment: 15g cho      Outpatient Diabetes Provider: PCP - Bony Castaneda   Formal Diabetes Education/Educator: Rohini Villa, Pharm D (LOV 3/20/25)           Physical Exam:   BP (!) 158/101 (BP Location: Left arm)   Pulse 98   Temp 98  F (36.7  C)  "(Oral)   Resp 18   Ht 1.6 m (5' 2.99\")   Wt 62.4 kg (137 lb 9.1 oz)   SpO2 97%   BMI 24.38 kg/m    General Appearance: Somnolent. Disoriented to situation.   Lungs:  Breathing comfortably  Abdomen: Round, nondistended.   Neuro: somnolent, difficult to arouse   Psych:  Calm         Data:     Lab Results   Component Value Date    A1C 8.8 (H) 03/23/2025    A1C 9.6 (H) 01/16/2025    A1C 11.2 (H) 09/23/2024    A1C 10.2 (H) 05/20/2024    A1C 10.7 (H) 01/29/2024    A1C 7.8 (H) 06/21/2021    A1C 11.7 (H) 09/27/2020       ROUTINE IP LABS (Last four results)  BMP  Recent Labs   Lab 03/29/25  0400 03/28/25  2124 03/28/25  1627 03/28/25  1111 03/26/25  0851 03/26/25  0846 03/25/25  1249 03/25/25  1118 03/24/25  0836 03/24/25  0743 03/23/25  1608 03/23/25  1017   NA  --   --   --   --   --  135  --  139  --  138  --  140   POTASSIUM  --   --   --   --   --  4.3  --  4.3  --  4.2  --  4.9   CHLORIDE  --   --   --   --   --  100  --  105  --  103  --  106   VERONICA  --   --   --   --   --  9.2  --  9.2  --  9.1  --  9.3   CO2  --   --   --   --   --  24  --  22  --  23  --  20*   BUN  --   --   --   --   --  27.6*  --  26.0*  --  35.4*  --  45.0*   CR  --   --   --   --   --  1.52*  --  1.42*  --  1.46*  --  1.54*   * 188* 323* 239*   < > 392*   < > 212*   < > 315*   < > 345*    < > = values in this interval not displayed.     CBC  Recent Labs   Lab 03/26/25  0846 03/25/25  1118 03/24/25  0743 03/23/25  1017   WBC 5.6 5.9 6.7 5.5   RBC 4.12 3.98 3.74* 3.78*   HGB 11.3* 11.1* 10.5* 10.5*   HCT 35.5 35.3 33.3* 33.6*   MCV 86 89 89 89   MCH 27.4 27.9 28.1 27.8   MCHC 31.8 31.4* 31.5 31.3*   RDW 14.4 14.5 14.5 14.6    206 191 185     INRNo lab results found in last 7 days.    Inpatient Diabetes Service will continue to follow, please don't hesitate to contact the team with any questions or concerns.     PEGGY Hill, CNP   Inpatient Diabetes Management Service   Pager: [phone]   Available on Vocera      Plan " discussed with patient, and primary team via this note.    To contact Inpatient Diabetes Service:     7 AM - 5 PM: Page the IDS [email or web address] following the patient that day (see filed or incomplete progress notes/consult notes under Endocrinology)    OR if uncertain of provider assignment: page job code [phone]    5 PM - 7 AM: First call after hours is to primary service.    For urgent after-hours questions, page job code for on call fellow: [phone]     I spent a total of 35 minutes on the date of the encounter doing prep/post-work, chart review, history and exam, documentation and further activities per the note including lab review, multidisciplinary communication, counseling the patient and/or coordinating care regarding acute hyper/hypoglycemic management, as well as discharge management and planning/communication.

## 2025-03-29 NOTE — PROGRESS NOTES
[facility]    Progress Note - Medicine Service, MAROON TEAM 2       Date of Admission:  3/23/2025    Assessment & Plan   Isabell Matthews is a 64 year old female admitted on 3/23/2025. She has a notable history of frequent falls, recurrent UTI, vascular dementia, CVA (most recent 2024), HTN, HLD, CKD IIIb, T1DM c/b neuropathy, and seizures. Was admitted for dizziness and falls, found to have L wrist fx. Workup revealed significant orthostatic hypotension, likely contributing to presentation.     Today's Updates:  - New somnolence today  - Pending CT to r/o bleed     #Orthostatic hypotension  #Recurrent falls   #Vascular Dementia  Presenting after an unwitnessed fall morning of 3/23. Was found on floor by daughter noting pain in L side and bleeding from face. Non-con CT head without acute intracranial pathology or fracture. No focal neuro deficits. CXR without infectious signs. EKG sinus rhythm without ST or T wave changes. Normal carotid vasculature in 2024.   Noted to have significant orthostatic hypotension: Lyin/81, Sittin/76, 136/79 recheck, Standin/74- potentially etiology of falls.   - Cardiology consulted, appreciate recs   - Stopping long-acting anti-HTN: amlodipine   - Starting short acting regimen w hydralazine:   > 10 mg morning   > 10 mg 8 hours after morning dose    > 25 mg nightly before sleep  - titrate above to response.   - If not tolerated at the starting dose, suggested having risk vs. benefit conversation (increase CVA risk with hypertension vs. Increased fall risk with hypotension).  - PT following:  - recommend discharge to home with family training for transfers and gait.   - Discussed possible higher level of care, daughter is working with  for possible options  - Telemetry was not tolerated by patient   - Discussed possible Neuro outpatient on discharge, daughter reports following with Neuro    #L wrist fx  #L  periorbital Hematoma  2/2 fall as above. Imaging in ED showing acute, comminuted, and impacted fracture of L distal radius. No facial fracture.   - Ortho determined that surgery was not indicated, recommended wearing the brace continuously and following up in a week (3/31)  - Multimodal pain management:    > Scheduled diclofenac gel for wrist and Tylenol  > Lidocaine patches for spot treatment    #Simple cystitis, resolved  Pt has a history of recurrent UTIs. Most recently pan-sensitive E.coli in January 2025. Does not report any sx on admission but UA positive for nitrites and some bacteria. No LE or WBC. S/p 1 dose CTX in ED.   - S/p 3 days abx  - urine culture is not useful as it appears contaminated.     #DM1  #Stress hyperglycemia   A1c of 9.6% on January 2025. BS in 300s on admission. A1c repeated 3/23/25- 8.8%.  Home regimen: Lantus 20u at bedtime + 2u mealtime with additional correction scale.  - Endo consulted, appreciate recs:   Lantus 18 units q 24 hrs at 2200  - Novolog Meal Coverage: 1 unit per 15--> 1 per 12 g CHO TID AC and 1:20g CHO PRN with snacks/supplements  - Novolog Correction Scale: medium resistance (ISF: 50) TID AC / HS / 0200   - BG Monitoring: TID AC, HS, 0200  - Hypoglycemia protocol  - Carb counting protocol     #Hypothyroidism  PTA was on 75 mcg levothyroxine daily. Low T4 (0.67) with high TSH (37.30) on admission.   - Increase to 100 mcg levothyroxine daily     #CKD3b  Baseline Cr of 1.5. Cr on admission of 1.5, has remained stable.     #HTN  #HLD  Has been dealing with hypertension as above. Will discontinue amlodipine, carvedilol given significant hypotension as well.   - Continue Atorvastatin 40mg  - Stop Amlodipine 5mg  - Stop Carvedilol 12.5mg BID    #Prior CVAs (ischemic and hemorrhagic)  #Vascular dementia  No focal deficits on exam today.   - Holding ASA given risk of falls, continue to reevaluate  - Recommend getting MOCA as an outpatient     #H/o seizures  Reported history of  seizures iso DKA and strokes. None reported in last 4 years  - Continue Keppra 500mg BID      Diet: Moderate Consistent Carb (60 g CHO per Meal) Diet  Diet    DVT Prophylaxis: Pneumatic Compression Devices  Parker Catheter: Not present  Fluids: PO  Lines: None     Cardiac Monitoring: None  Code Status: No CPR- Do NOT Intubate      Clinically Significant Risk Factors                   # Hypertension: Noted on problem list     # Dementia: noted on problem list            # Financial/Environmental Concerns: none         Social Drivers of Health   Food Insecurity: Unknown (3/25/2025)    Food Insecurity     Within the past 12 months, did you worry that your food would run out before you got money to buy more?: Patient unable to answer     Within the past 12 months, did the food you bought just not last and you didn t have money to get more?: Patient unable to answer   Housing Stability: Unknown (3/25/2025)    Housing Stability     Do you have housing? : Patient unable to answer     Are you worried about losing your housing?: Patient unable to answer   Tobacco Use: Medium Risk (3/23/2025)    Patient History     Smoking Tobacco Use: Former     Smokeless Tobacco Use: Never   Financial Resource Strain: Unknown (3/25/2025)    Financial Resource Strain     Within the past 12 months, have you or your family members you live with been unable to get utilities (heat, electricity) when it was really needed?: Patient unable to answer   Transportation Needs: Unknown (3/25/2025)    Transportation Needs     Within the past 12 months, has lack of transportation kept you from medical appointments, getting your medicines, non-medical meetings or appointments, work, or from getting things that you need?: Patient unable to answer   Interpersonal Safety: Unknown (3/25/2025)    Interpersonal Safety     Do you feel physically and emotionally safe where you currently live?: Patient unable to answer     Within the past 12 months, have you been  hit, slapped, kicked or otherwise physically hurt by someone?: Patient unable to answer     Within the past 12 months, have you been humiliated or emotionally abused in other ways by your partner or ex-partner?: Patient unable to answer      Disposition Plan     Medically Ready for Discharge: Anticipated in 2-4 Days       The patient's care was discussed with the Attending Physician, Dr. Granados .    [facility]  Medical Student  Medicine Service, [address]  Securely message with [employer] info)  Text page via [facility] Paging/Directory   See signed in provider for up to date coverage information      Venus Finney MD  PGY2  Date of Service (when I saw the patient): 03/28/25    ____________________________________________________________________  Subjective  NOAE. Pt somnolent, not answering most questions.     Physical Exam   Vital Signs: Temp: 98.1  F (36.7  C) Temp src: Oral BP: (!) 182/96 (RN  notify) Pulse: 103   Resp: 18 SpO2: 97 % O2 Device: None (Room air)    Weight: 137 lbs 9.07 oz    General: Sitting in bed comfortably. NAD.   HEENT: NC, MMM, EOMI with 1 mm pupils bilaterally, clear conjunctivae, normal oropharynx. Left periorbital swelling and purple and yellowing ecchymosis.   Cardiovascular: RRR, No murmurs, rubs, or gallops. Normal S1 and S2.  Pulmonary: CTAB, No wheezes/rhonchi/rales, Normal inspiratory effort.  Abdominal: Soft, non-distended, non-tender. Normal bowel sounds.  Extremities: Left wrist pain with minimal movement. Bilateral hip pain with right hip bruising. Yellowing ecchymosis present on the proximal shin.  Skin: Clean, warm, dry, intact  Neuro: A&O to self, and time. Not to place. Speech is slow and disjointed. CN II-XII grossly intact. Moving all extremities.  Somnolent.     Medical Decision Making         Data   Imaging results reviewed over the past 24 hrs:   No results found for this or any previous visit (from the  past 24 hours).

## 2025-03-29 NOTE — PLAN OF CARE
Goal Outcome Evaluation:      Plan of Care Reviewed With: patient    Overall Patient Progress: no change    A/O x 2, oriented to person and place. Bed alarm on, pt frequently tries to get up. VSS with HTN on RA, pt denies SOB/nausea, pain in wrist managed by tylenol & brace, pt continues to refuse sling. Assist x1, incontinent with purwick in place, regular diet. Bruise and swelling of L. Eye/wrist/hip from previous fall. Pt occasionally takes brace and purwick off, monitor for PIV line pulling.    Continue with POC     No

## 2025-03-30 ENCOUNTER — APPOINTMENT (OUTPATIENT)
Dept: OCCUPATIONAL THERAPY | Facility: CLINIC | Age: 65
DRG: 563 | End: 2025-03-30
Payer: COMMERCIAL

## 2025-03-30 VITALS
OXYGEN SATURATION: 99 % | HEIGHT: 63 IN | WEIGHT: 137.57 LBS | HEART RATE: 95 BPM | RESPIRATION RATE: 20 BRPM | TEMPERATURE: 97.7 F | BODY MASS INDEX: 24.38 KG/M2 | DIASTOLIC BLOOD PRESSURE: 76 MMHG | SYSTOLIC BLOOD PRESSURE: 169 MMHG

## 2025-03-30 LAB
ANION GAP SERPL CALCULATED.3IONS-SCNC: 13 MMOL/L (ref 7–15)
BASOPHILS # BLD AUTO: 0 10E3/UL (ref 0–0.2)
BASOPHILS NFR BLD AUTO: 1 %
BUN SERPL-MCNC: 30.5 MG/DL (ref 8–23)
CALCIUM SERPL-MCNC: 9.3 MG/DL (ref 8.8–10.4)
CHLORIDE SERPL-SCNC: 103 MMOL/L (ref 98–107)
CREAT SERPL-MCNC: 1.54 MG/DL (ref 0.51–0.95)
EGFRCR SERPLBLD CKD-EPI 2021: 37 ML/MIN/1.73M2
EOSINOPHIL # BLD AUTO: 0.1 10E3/UL (ref 0–0.7)
EOSINOPHIL NFR BLD AUTO: 3 %
ERYTHROCYTE [DISTWIDTH] IN BLOOD BY AUTOMATED COUNT: 15.2 % (ref 10–15)
GLUCOSE BLDC GLUCOMTR-MCNC: 138 MG/DL (ref 70–99)
GLUCOSE BLDC GLUCOMTR-MCNC: 139 MG/DL (ref 70–99)
GLUCOSE BLDC GLUCOMTR-MCNC: 148 MG/DL (ref 70–99)
GLUCOSE BLDC GLUCOMTR-MCNC: 192 MG/DL (ref 70–99)
GLUCOSE BLDC GLUCOMTR-MCNC: 211 MG/DL (ref 70–99)
GLUCOSE BLDC GLUCOMTR-MCNC: 45 MG/DL (ref 70–99)
GLUCOSE BLDC GLUCOMTR-MCNC: 53 MG/DL (ref 70–99)
GLUCOSE BLDC GLUCOMTR-MCNC: 58 MG/DL (ref 70–99)
GLUCOSE BLDC GLUCOMTR-MCNC: 59 MG/DL (ref 70–99)
GLUCOSE SERPL-MCNC: 178 MG/DL (ref 70–99)
HCO3 SERPL-SCNC: 23 MMOL/L (ref 22–29)
HCT VFR BLD AUTO: 36.9 % (ref 35–47)
HGB BLD-MCNC: 11.5 G/DL (ref 11.7–15.7)
IMM GRANULOCYTES # BLD: 0 10E3/UL
IMM GRANULOCYTES NFR BLD: 0 %
LYMPHOCYTES # BLD AUTO: 1.6 10E3/UL (ref 0.8–5.3)
LYMPHOCYTES NFR BLD AUTO: 29 %
MCH RBC QN AUTO: 28.3 PG (ref 26.5–33)
MCHC RBC AUTO-ENTMCNC: 31.2 G/DL (ref 31.5–36.5)
MCV RBC AUTO: 91 FL (ref 78–100)
MONOCYTES # BLD AUTO: 0.3 10E3/UL (ref 0–1.3)
MONOCYTES NFR BLD AUTO: 6 %
NEUTROPHILS # BLD AUTO: 3.4 10E3/UL (ref 1.6–8.3)
NEUTROPHILS NFR BLD AUTO: 62 %
NRBC # BLD AUTO: 0 10E3/UL
NRBC BLD AUTO-RTO: 0 /100
PLATELET # BLD AUTO: 248 10E3/UL (ref 150–450)
POTASSIUM SERPL-SCNC: 4 MMOL/L (ref 3.4–5.3)
RBC # BLD AUTO: 4.07 10E6/UL (ref 3.8–5.2)
SODIUM SERPL-SCNC: 139 MMOL/L (ref 135–145)
WBC # BLD AUTO: 5.5 10E3/UL (ref 4–11)

## 2025-03-30 PROCEDURE — 250N000013 HC RX MED GY IP 250 OP 250 PS 637

## 2025-03-30 PROCEDURE — 85004 AUTOMATED DIFF WBC COUNT: CPT

## 2025-03-30 PROCEDURE — 258N000001 HC RX 258

## 2025-03-30 PROCEDURE — 36415 COLL VENOUS BLD VENIPUNCTURE: CPT

## 2025-03-30 PROCEDURE — 85014 HEMATOCRIT: CPT

## 2025-03-30 PROCEDURE — 99232 SBSQ HOSP IP/OBS MODERATE 35: CPT | Mod: 24

## 2025-03-30 PROCEDURE — 80048 BASIC METABOLIC PNL TOTAL CA: CPT

## 2025-03-30 PROCEDURE — 82310 ASSAY OF CALCIUM: CPT

## 2025-03-30 PROCEDURE — 999N000248 HC STATISTIC IV INSERT WITH US BY RN

## 2025-03-30 PROCEDURE — 97535 SELF CARE MNGMENT TRAINING: CPT | Mod: GO

## 2025-03-30 PROCEDURE — 82565 ASSAY OF CREATININE: CPT

## 2025-03-30 PROCEDURE — 99238 HOSP IP/OBS DSCHRG MGMT 30/<: CPT | Mod: GC | Performed by: STUDENT IN AN ORGANIZED HEALTH CARE EDUCATION/TRAINING PROGRAM

## 2025-03-30 RX ORDER — INSULIN ASPART 100 [IU]/ML
INJECTION, SOLUTION INTRAVENOUS; SUBCUTANEOUS
Qty: 15 ML | Refills: 2 | Status: SHIPPED | OUTPATIENT
Start: 2025-03-30

## 2025-03-30 RX ADMIN — ACETAMINOPHEN 975 MG: 325 TABLET, FILM COATED ORAL at 16:04

## 2025-03-30 RX ADMIN — HYDRALAZINE HYDROCHLORIDE 10 MG: 10 TABLET ORAL at 13:09

## 2025-03-30 RX ADMIN — DEXTROSE MONOHYDRATE 25 ML: 25 INJECTION, SOLUTION INTRAVENOUS at 04:59

## 2025-03-30 RX ADMIN — LEVETIRACETAM 500 MG: 500 TABLET, FILM COATED ORAL at 08:23

## 2025-03-30 RX ADMIN — Medication 2 G: at 08:24

## 2025-03-30 RX ADMIN — LORATADINE 10 MG: 10 TABLET ORAL at 08:24

## 2025-03-30 RX ADMIN — Medication 2 G: at 13:10

## 2025-03-30 RX ADMIN — CYANOCOBALAMIN TAB 500 MCG 1000 MCG: 500 TAB at 08:23

## 2025-03-30 RX ADMIN — DULOXETINE HYDROCHLORIDE 20 MG: 20 CAPSULE, DELAYED RELEASE ORAL at 08:23

## 2025-03-30 RX ADMIN — LEVOTHYROXINE SODIUM 100 MCG: 0.05 TABLET ORAL at 08:23

## 2025-03-30 RX ADMIN — DEXTROSE 30 G: 15 GEL ORAL at 04:32

## 2025-03-30 RX ADMIN — ACETAMINOPHEN 975 MG: 325 TABLET, FILM COATED ORAL at 04:00

## 2025-03-30 RX ADMIN — ACETAMINOPHEN 975 MG: 325 TABLET, FILM COATED ORAL at 10:24

## 2025-03-30 RX ADMIN — ATORVASTATIN CALCIUM 40 MG: 20 TABLET, FILM COATED ORAL at 08:23

## 2025-03-30 RX ADMIN — HYDRALAZINE HYDROCHLORIDE 10 MG: 10 TABLET ORAL at 08:23

## 2025-03-30 RX ADMIN — LIDOCAINE 4% 1 PATCH: 40 PATCH TOPICAL at 08:24

## 2025-03-30 ASSESSMENT — ACTIVITIES OF DAILY LIVING (ADL)
ADLS_ACUITY_SCORE: 67

## 2025-03-30 NOTE — PLAN OF CARE
"Goal Outcome Evaluation:    Plan of Care Reviewed With: patient, child  Overall Patient Progress: no changeOverall Patient Progress: no change     [id number]    BP (!) 169/76 (BP Location: Right arm)   Pulse 95   Temp 97.7  F (36.5  C) (Oral)   Resp 20   Ht 1.6 m (5' 2.99\")   Wt 62.4 kg (137 lb 9.1 oz)   SpO2 99%   BMI 24.38 kg/m      Left arm in brace/sling. Pain rated 3-5/10, using Voltaren gel and scheduled Tylenol. Bruised eye improving, more of a yellow appearance now.     Hypertensive, not within parameters to notify. OT completed orthos this shift. Alert and Oriented x3, awake and interactive today. Sometimes would think she was in [place], but after prompting, she would say [place]. Does endorse feeling tired and declined to sit in the chair today. Denies nausea and SOB. Eating well, BG covered per MAR. No BM this shift, passing flatus, denies feeling constipated. Int stress incontinence, getting up to the bathroom this shift. Took a shower without issues. Patient and daughter felt ready to go home today.       "

## 2025-03-30 NOTE — DISCHARGE INSTRUCTIONS
Diabetes Management Discharge Plan    Blood glucose monitoring: Three times daily before meals, and at bedtime.  Blood Glucose (BG) goals: 100-180 mg/dL before meals, less than 250 mg/dL 2 hrs after meals.      Glucose Control Regimen:  1) Long-acting insulin: glargine (Lantus) - take 18 units once daily at bedtime. Take at same time each day.  If there is a low BG overnight x2 during the week, decrease this dose by 2 units.  If not eating well during the day, consider reducing the dose to 10 units      2) Rapid-acting insulin: lispro (Humalog) carbohydrate coverage/mealtime insulin -   At Home:  take 1 unit per 15 grams of carbohydrates before meals and 1 unit per 20 grams of carbohydrates for snacks.  At Day Center:  Take 2 units before meals     4) Rapid-acting insulin: lispro (Humalog) correction - see chart below. Take for high blood glucoses three times daily before meals and at bedtime.  You may add the correction dose to the carbohydrate coverage/mealtime insulin dose and give in one injection--ideally 10-15 minutes before a meal.  You may take the correction dose even if you skip a meal (as long as it has been 4 hours since previous correction dose).      Blood Glucose At Meals, Add At Bedtime, Take   Less than 150 None None   150-199 1 unit None   200-249 2 units 1 unit   250-299 3 units 2 units   300-349 4 units 3 units   350-399 5 units 4 units   400-449 6 units 5 units   450 or greater 7 units 6 units         Outpatient Follow-Up: We recommend scheduling an outpatient diabetes appointment 2-4 weeks from discharge (request placed in discharge navigator on 3/28/25). You can call [facility] at [phone] if you have scheduling questions or do not hear from them within a few days of discharge. Follow up sooner if blood glucose runs consistently greater than 200 mg/dL or if having more than two episodes less than 70 mg/dL.    Providers at the Endocrine clinic focusing on complex diabetes  that may be good to consider: Mai Figueroa, PAC; Roseanne Vincent, PAC, and Dr. Marshall Camarillo.      If you have urgent questions or concerns regarding your blood sugars or insulin, you may contact [phone] (the main hospital ). Ask to speak with the Endocrinologist on call.     Your target A1c value is less than 8% to help prevent future complications from diabetes. Your most recent A1c is 8.8%.

## 2025-03-30 NOTE — PLAN OF CARE
Goal Outcome Evaluation:      Plan of Care Reviewed With: patient    Overall Patient Progress: declining    Outcome Evaluation: Hypoglycemic overnight    A/O x 2, oriented to person and place, no change in mental status overnight. Bed alarm on, pt slept well overnight, lethargic but easy to arose. VSS with HTN on RA, max /110, provider aware. Pt denies SOB/nausea/pain, wrist brace on, pt continues to refuse sling. Assist x1, incontinent with purwick in place, regular diet. Bruise and swelling of L. Eye/wrist/hip from previous fall.    Pt hypoglycemic overnight with lowest BG of 45, first IV dextrose started but IV infiltrated and extravasated. PO dextrose and second IV dextrose given, BG stabilized at 130-140s. Extravasation site border traced, continue with cold compress. Day team to look at site to determine next steps with site care, overnight provider aware.     Continue with POC

## 2025-03-30 NOTE — PROGRESS NOTES
Care Management Discharge Note    Discharge Date: 03/30/2025       Discharge Disposition: Home with family     Discharge Services:  Home health    Discharge DME: None    Discharge Transportation: stretch transport    Private pay costs discussed: Not applicable    Does the patient's insurance plan have a 3 day qualifying hospital stay waiver?  No    PAS Confirmation Code: NA   Patient/family educated on Medicare website which has current facility and service quality ratings: NA    Education Provided on the Discharge Plan:  Yes  Persons Notified of Discharge Plans: Vishal Peña  Patient/Family in Agreement with the Plan: yes    Handoff Referral Completed: Yes, [facility] PCP: Internal handoff referral completed    Additional Information:  Patient will be discharging home with daughter, Vishal Pñea.  Patient will be discharging home with home health service through [facility] health agency. Request sent to Dr. Venus Finney to place home health order. Patient/family education completed on discharge plan. Vishal voiced acknowledgement and is in agreement with discharge plan home with home care. Vishal reports that wheelchair transport has been arranged,  with a  time of 4 pm scheduled. Vishal denied any additional needs for discharge home.     [facility]  Phone   Fax     Omkar Aly RN     3/30/2025  Nurse Coordinator      Social Work and Care Management Department       SEARCHABLE in [facility] - search CARE COORDINATOR       [place] ([phone]) Saturday & Sunday; ([phone]) [facility] Recognized Holidays     Units: 5A Onc 5201 - 5219 RNCC,  5A Onc 5220 thru 5240 RNCC, 5C OFFSERVICE 3092-2630 RNCC & 5C OFF SERVICE [phone] RNCC       Units: 6B Vocera, 6C Card 6401 thru 6420 RNCC, 6C Card 6502 thru 6514 RNCC & 6C Card 6515 thru 6519 RNCC        Units: 7A SOT RNCC Vocera, 7B Med Surg Vocera, 7C Med Surg 7401 thru 7418  RNCC & 7C Med Surg 7502 thru 4086 RNCC       Units: 6A Vocera & 4A CVICU Vocera, 4C MICU Vocera, and 4E SICU Vocera         Units: 5 Ortho Vocera & 5 Med Surg Vocera        Units: 6 Med Surg Vocera & 8 Med Surg Vocera

## 2025-03-30 NOTE — PROGRESS NOTES
Discharge    D: Orders for discharge and outpatient medications written.    I: Home medications and return to clinic schedule reviewed with patient. Discharge instructions and parameters for calling Health Care Provider reviewed. Patient left at 1655 accompanied by daughter and transport aide.     A: Patient/family verbalized understanding and was ready for discharge.     P: DaughterVishal, picked up medications in Pharmacy. Educated on when to be concerned with high BP/go to the ED (per what MD stated). Follow up as scheduled per AVS.

## 2025-03-30 NOTE — PROGRESS NOTES
Care Management Discharge Note    Discharge Date: 03/30/2025       Discharge Disposition:  Home with home care and daughter    Discharge Services:  [facility] will resume services     Discharge DME: Other (see comment)     Discharge Transportation: Wheelchair transport    Private pay costs discussed: Not applicable    Does the patient's insurance plan have a 3 day qualifying hospital stay waiver?  No    PAS Confirmation Code:    Patient/family educated on Medicare website which has current facility and service quality ratings: yes    Education Provided on the Discharge Plan:    Persons Notified of Discharge Plans: No  Patient/Family in Agreement with the Plan: yes    Handoff Referral Completed: Yes, [facility] PCP: Internal handoff referral completed    Additional Information:    Staff nurse asked if  would set up a transport home.  Spoke with patient and daughter.  Ready to discharge home. Daughter said [facility] is ready to resume services at home.       arranged a ride with wheelchair for patient  at 5pm with daughter accompanying patient.      No other needs identified by patient or daughter.     ROBIN Farfan  3/30/2025       Social Work and Care Management Department       SEARCHABLE in [facility] - search SOCIAL WORK       [place] (0800 - 1630) Saturday and Sunday     Units: 4A Vocera, 4C Vocera, & 4E Vocera        Units: 5A 1403-9474 Vocera, 5A 7319-0065 Vocera , BMT SW 1 BMT SW 2, BMT SW 3 & BMT SW 4  5C Off Service 5401 - 5416  5C Off Service [phone]     Units: 6A Vocera & 6B Vocera      Units: 6C Vocera     Units: 7A Vocera & 7B Vocera      Units: 7C Med Surg 7401 thru 7418 and 7C Med Surg 7502 thru 7521      Unit: [place] ED Vocera & [place] Obs Vocera     [facility] [place] ([phone]) Saturday and Sunday      Units: 5 Ortho Vocera, 5 Med Surg Vocera & WB ED Vocera     Units: 6 Med Surg Vocera, 8 Med Surg Vocera, & 10 ICU Vocera      After hours Vocera  [facility] [place] and After Hours Vocera [place]     Saturday & Sunday (1630 - 0000)    Mon-Fri (3907-2217)     [facility] Recognized Holidays  ([phone])    Units: ALL   - see above VOCERA links to units and after hours

## 2025-03-30 NOTE — PROGRESS NOTES
Inpatient Diabetes Management Service: Daily Progress Note     HPI: Isabell Matthews is a 64 year old female admitted on 3/23/2025. She has a notable history of frequent falls, recurrent UTI, vascular dementia, CVA (most recent 06/2024), HTN, HLD, CKD IIIb, T1DM c/b neuropathy, and seizures. Was admitted for dizziness and falls, found to have L wrist fx. Remains admitted for further workup and surgical consultation. Inpatient Diabetes Service consulted for glycemicmangement.             Assessment/Plan:     Assessment:   Type 1  Diabetes Mellitus with DEMETRIA elevated at 6.2 and C-peptide=<0.1 on 6/17/2024, complicated by peripheral neuropathy, nephropathy, hx of DKA and hypoglycemia. Sub-optimal control (A1c 8.8 % 3/23/25, Hgb: 10.5)  Dementia - daughter is caregiver and manages diabetes for patient.   Stress hyperglycemia    Plan/Recommendations:   - Reduce Lantus 18 --> 10 units q 24 hrs at 2200  - Novolog Meal Coverage: 1 unit per 12--> 1 per 15 g CHO TID AC and 1:20g CHO PRN with snacks/supplements  - Novolog Correction Scale: medium resistance (ISF: 50) TID AC / HS / 0200   - BG Monitoring: TID AC, HS, 0200  - Hypoglycemia protocol  - Carb counting protocol     Discussion:   Somnolent yesterday, head CT with no acute changes. Glucoses were stable within range at 150-160 despite patient not having breakfast with 18 units of Lantus given night prior. Did have dinner and received carb coverage around 1800, bedtime BG was 165. Given stable trends, Lantus was maintained at 18 units yesterday. Unfortunately, patient went low around 0400 with BG to 45. Lantus for this evening already reduced to 10 by overnight team.     Met with patient and daughter Vishal at bedside who is patient's care provider and does her insulin dosing. Long discussion regarding glycemic trends and challenges with managing diabetes. We note the Lantus dose of 18 units may be too high if patient is not eating as she was not  yesterday. However, daughter notes that with more dramatic changes patient will sometimes rise to 400s and has been in DKA before which is hard to balance. If she is concerned about mom going low at night, will try to give her a snack to support Lantus dosing. She is working to resume CGM to more closely monitor blood sugars. She also expresses that she would like notes to include discharge with both carb ratio and fixed meal insulin for them to try. Daughter is aware of carb counting and has done before. Patient will not be returning to her facility for now, but needs fixed meal dosing when she does go there.     Diabetes Management Discharge Plan  Instructions to patient were posted in AVS and discussed on day of discharge.   Medications and supplies are to be ordered by primary service on discharge.   *please use the "[employer]" non-branded discharge supply order set ([id number])*     Patient will need the following supplies prescribed:   Lantus- please send to home pharmacy not [facility] discharge per family request.      Blood glucose monitoring: Three times daily before meals, and at bedtime.  Blood Glucose (BG) goals: 100-180 mg/dL before meals, less than 250 mg/dL 2 hrs after meals.      Glucose Control Regimen:  1) Long-acting insulin: glargine (Lantus) - take 18 units once daily at bedtime. Take at same time each day.  If there is a low BG overnight x2 during the week, decrease this dose by 2 units.  If not eating well during the day, consider reducing the dose to 10 units      2) Rapid-acting insulin: lispro (Humalog) carbohydrate coverage/mealtime insulin -   At Home:  take 1 unit per 15 grams of carbohydrates before meals and 1 unit per 20 grams of carbohydrates for snacks.  At Day Center:  Take 2 units before meals     4) Rapid-acting insulin: lispro (Humalog) correction - see chart below. Take for high blood glucoses three times daily before meals and at bedtime.  You may add the correction dose to the carbohydrate  coverage/mealtime insulin dose and give in one injection--ideally 10-15 minutes before a meal.  You may take the correction dose even if you skip a meal (as long as it has been 4 hours since previous correction dose).      Blood Glucose At Meals, Add At Bedtime, Take   Less than 150 None None   150-199 1 unit None   200-249 2 units 1 unit   250-299 3 units 2 units   300-349 4 units 3 units   350-399 5 units 4 units   400-449 6 units 5 units   450 or greater 7 units 6 units         Outpatient Follow-Up: We recommend scheduling an outpatient diabetes appointment 2-4 weeks from discharge (request placed in discharge navigator on 3/28/25). You can call the [facility] at [phone] if you have scheduling questions or do not hear from them within a few days of discharge. Follow up sooner if blood glucose runs consistently greater than 200 mg/dL or if having more than two episodes less than 70 mg/dL.    Providers at the Endocrine clinic focusing on complex diabetes that may be good to consider: Mai Figueroa, PAC; NEGRA Alex, and Dr. Marshall Camarillo.      If you have urgent questions or concerns regarding your blood sugars or insulin, you may contact [phone] (the main hospital ). Ask to speak with the Endocrinologist on call.     Your target A1c value is less than 8% to help prevent future complications from diabetes. Your most recent A1c is 8.8%.     Please notify Inpatient Diabetes Service if changes are planned to steroids, nutrition, TPN/TF and anticipated procedures requiring prolonged NPO status.         Interval History/Review of Systems :   The last 24 hours progress and nursing notes reviewed.  Patient is more alert today. Ate good breakfast. Daughter at bedside.     Planned Procedures/Surgeries: none    Inpatient Glucose Control:       Recent Labs   Lab 03/30/25  0533 03/30/25  0516 03/30/25  0457 03/30/25  0424 03/30/25  0409 03/30/25  0358   * 148* 58* 45* 53* 59*     "         Medications Impacting Glycemia:   Steroids:  none   D5W-containing solutions/medications: none   Other medications impacting glucose: none         Nutrition:   Orders Placed This Encounter      Moderate Consistent Carb (60 g CHO per Meal) Diet      Diet    Supplements: none   TF: none   TPN: none         Diabetes History: see full consult note for complete diabetes history   Diabetes Type and Duration: DM for > 40 years, diagnosed in her 40s. Per daughter,  initially told she has T2DM then T1DM for a while then flipped back to being told T2DM.      6/17/2024 positive GAD65 antibody and c-peptide <0.1 but BG elevated       PTA Medication Regimen:   - Lantus 20 units at night   - Novolog 2 units before meals   - Novolog correction 1:50>150 TID AC    Glucose monitoring device/frequency/trends: glucometer before meals and at bedtime. Has tried and failed multiple CGMs (issues with accuracy or patient picking CGM off).   Hypoglycemia PTA:   - Frequency: 2x per month (in the morning)  - Severity: + history of severe unconscious lows   - Awareness: variable; will go make    - Treatment: 15g cho      Outpatient Diabetes Provider: PCP - Bony Castaneda   Formal Diabetes Education/Educator: Rohini Villa, Pharm D (LOV 3/20/25)           Physical Exam:   BP (!) 181/105   Pulse 94   Temp 98  F (36.7  C) (Oral)   Resp 16   Ht 1.6 m (5' 2.99\")   Wt 62.4 kg (137 lb 9.1 oz)   SpO2 97%   BMI 24.38 kg/m      General Appearance: awake and alert. Confused.   Lungs:  Breathing comfortably  Abdomen: Round, nondistended.   Psych:  Calm         Data:     Lab Results   Component Value Date    A1C 8.8 (H) 03/23/2025    A1C 9.6 (H) 01/16/2025    A1C 11.2 (H) 09/23/2024    A1C 10.2 (H) 05/20/2024    A1C 10.7 (H) 01/29/2024    A1C 7.8 (H) 06/21/2021    A1C 11.7 (H) 09/27/2020       ROUTINE IP LABS (Last four results)  BMP  Recent Labs   Lab 03/30/25  0533 03/30/25  0516 03/30/25  0457 03/30/25  0424 03/29/25  2228 " 03/29/25  1717 03/26/25  0851 03/26/25  0846 03/25/25  1249 03/25/25  1118 03/24/25  0836 03/24/25  0743   NA  --   --   --   --   --  142  --  135  --  139  --  138   POTASSIUM  --   --   --   --   --  4.3  --  4.3  --  4.3  --  4.2   CHLORIDE  --   --   --   --   --  106  --  100  --  105  --  103   VERONICA  --   --   --   --   --  9.4  --  9.2  --  9.2  --  9.1   CO2  --   --   --   --   --  26  --  24  --  22  --  23   BUN  --   --   --   --   --  28.9*  --  27.6*  --  26.0*  --  35.4*   CR  --   --   --   --   --  1.51*  --  1.52*  --  1.42*  --  1.46*   * 148* 58* 45*   < > 159*   < > 392*   < > 212*   < > 315*    < > = values in this interval not displayed.     CBC  Recent Labs   Lab 03/29/25  1717 03/26/25  0846 03/25/25  1118 03/24/25  0743   WBC 5.0 5.6 5.9 6.7   RBC 3.97 4.12 3.98 3.74*   HGB 10.9* 11.3* 11.1* 10.5*   HCT 34.6* 35.5 35.3 33.3*   MCV 87 86 89 89   MCH 27.5 27.4 27.9 28.1   MCHC 31.5 31.8 31.4* 31.5   RDW 15.2* 14.4 14.5 14.5    209 206 191     INRNo lab results found in last 7 days.    Inpatient Diabetes Service will continue to follow, please don't hesitate to contact the team with any questions or concerns.     PEGGY Hill, CNP   Inpatient Diabetes Management Service   Pager: [phone]   Available on Vocera      Plan discussed with patient, and primary team via this note.    To contact Inpatient Diabetes Service:     7 AM - 5 PM: Page the IDS BULMARO following the patient that day (see filed or incomplete progress notes/consult notes under Endocrinology)    OR if uncertain of provider assignment: page job code [phone]    5 PM - 7 AM: First call after hours is to primary service.    For urgent after-hours questions, page job code for on call fellow: [phone]     I spent a total of 40 minutes on the date of the encounter doing prep/post-work, chart review, history and exam, documentation and further activities per the note including lab review, multidisciplinary communication,  counseling the patient and/or coordinating care regarding acute hyper/hypoglycemic management, as well as discharge management and planning/communication.

## 2025-03-30 NOTE — PROVIDER NOTIFICATION
Provider Notification     Re: Patient's daughter has arrived. She is wondering if Isabell can have a Nicotine patch order as it has been helpful, per Isabell. Typically Isabell intermittently vapes at home. Also, what is a good BP parameter to be aware of, daughter is concerned about stroke risk with it being elevated.     Care coordinator can obtain a ride home around [phone] if the plan is to still discharge. Please advise on this.    Action: Notified Dr. Venus Finney via [employer]    Response: Pending

## 2025-03-30 NOTE — PROVIDER NOTIFICATION
Provider Notification: @0422     Re:  BG 53, IV infiltrated after try IV dextrose, giving PO dextrose    Action: Notified Colton Mata via [employer]     Response: provider aware

## 2025-03-31 DIAGNOSIS — Z09 HOSPITAL DISCHARGE FOLLOW-UP: ICD-10-CM

## 2025-03-31 NOTE — PROGRESS NOTES
Occupational Therapy Discharge Summary    Reason for therapy discharge:    Discharged to home with home therapy.    Progress towards therapy goal(s). See goals on Care Plan in [facility] electronic health record for goal details.  Goals partially met.  Barriers to achieving goals:   discharge from facility.    Therapy recommendation(s):    Continued therapy is recommended.  Rationale/Recommendations:  To promote safety with functional transfers and ADLs in home setting.

## 2025-03-31 NOTE — PROGRESS NOTES
Chief Complaint:   Chief Complaint   Patient presents with    Consult     Fracture of left distal radius. DOI: 3/23/25       Referring Physician: No ref. provider found    Date of Injury: 3/23/2025  Mechanism of Injury: fall  Diagnosis: left distal radius fracture with dorsal angulation  Treatment: splint    History of Present Illness: Isabell Matthews is a 64 year old female presenting for evaluation of left wrist pain and fracture. Just got home from the hospital 2 days ago. Pain is controlled in the brace. The patient has a history of stroke and vascular dementia, and is currently having difficulty with blood pressure management. No finger numbness    Clinical documentation by Dr. Granados on 3/30/2025 was reviewed.    Past Medical History:   Past Medical History:   Diagnosis Date    Chronic pain     Coronary atherosclerosis 06/14/2016    Essential hypertension     Ex-cigarette smoker     quit 7/2018 over 35 years    Ex-cigarette smoker     Hyperlipidemia     Hypothyroid     Seizures (H)     Stroke (H)     Vascular dementia (H)        Past Surgical History:   Past Surgical History:   Procedure Laterality Date    athroplasty hip Bilateral     2003, 2006    CATARACT IOL, RT/LT      OTHER SURGICAL HISTORY      athroplasty hip    PICC DOUBLE LUMEN PLACEMENT Right 06/11/2023    right basilic 5 fr dl power picc 39 cm    STENT         Medications:   Current Outpatient Medications:     acetaminophen (TYLENOL) 325 MG tablet, Take 3 tablets (975 mg) by mouth every 8 hours., Disp: , Rfl:     aspirin (ASA) 81 MG chewable tablet, Take 1 tablet (81 mg) by mouth daily, Disp: 90 tablet, Rfl: 3    atorvastatin (LIPITOR) 40 MG tablet, Take 1 tablet (40 mg) by mouth daily., Disp: 90 tablet, Rfl: 3    blood glucose (NO BRAND SPECIFIED) test strip, Use to test blood sugar 4 times daily or as directed., Disp: 360 strip, Rfl: 3    blood glucose monitoring (ONE TOUCH ULTRA MINI) meter device kit, Use to test blood sugar 4 times daily or as  directed., Disp: 1 kit, Rfl: 0    chlorhexidine (PERIDEX) 0.12 % solution, Take 30 mLs by mouth daily. Swish and spit 30 mL once daily for 14 days.Swish and spit 30 mL once daily for 14 days., Disp: 473 mL, Rfl: 3    Continuous Glucose Sensor (FREESTYLE MARIA FERNANDA 2 SENSOR) [facility], Change every 14 days., Disp: 4 each, Rfl: 11    cyanocobalamin (VITAMIN B-12) 1000 MCG tablet, Take 1 tablet (1,000 mcg) by mouth daily, Disp: 100 tablet, Rfl: 3    diclofenac (VOLTAREN) 1 % topical gel, Apply 2 g topically 4 times daily., Disp: 150 g, Rfl: 2    DULoxetine (CYMBALTA) 20 MG capsule, TAKE 1 CAPSULE(20 MG) BY MOUTH DAILY, Disp: 90 capsule, Rfl: 3    gabapentin (NEURONTIN) 100 MG capsule, Take 1 capsule (100 mg) by mouth at bedtime., Disp: 90 capsule, Rfl: 3    Glucagon (GVOKE HYPOPEN) 1 MG/0.2ML pen, Inject the contents of 1 device under the skin into lower abdomen, outer thigh, or outer upper arm as needed for hypoglycemia. If no response after 15 minutes, additional 1 mg dose from a new device may be injected while waiting for emergency assistance., Disp: 2 each, Rfl: 1    hydrALAZINE (APRESOLINE) 10 MG tablet, Take 1 tablet (10 mg) by mouth 2 times daily., Disp: 180 tablet, Rfl: 0    hydrALAZINE (APRESOLINE) 25 MG tablet, Take 1 tablet (25 mg) by mouth at bedtime., Disp: 90 tablet, Rfl: 0    insulin aspart (NOVOLOG FLEXPEN) 100 UNIT/ML pen, Novolog Flexpen: 1 unit per 15 grams of carbohydrates with meals, Disp: 15 mL, Rfl: 2    insulin aspart (NOVOLOG FLEXPEN) 100 UNIT/ML pen, Inject 2 units with meals plus correction scale additional three times daily before meals  as follows  Pre-Meal  - 200 give additional 1 unit.  For Pre-Meal  -250 give 2 additional units.  For Pre-Meal -300 give 3 additional units.  For Pre-Meal -350 give 4 additional units.  For Pre-Meal -400 give 5 additional units Max 25 units daily (Patient taking differently: Inject 2 units with meals plus correction scale additional  three times daily before meals  as follows  Pre-Meal  - 200 give additional 1 unit.  For Pre-Meal  -250 give 2 additional units.  For Pre-Meal -300 give 3 additional units.  For Pre-Meal -350 give 4 additional units.  For Pre-Meal -400 give 5 additional units Max 20 units daily.), Disp: 30 mL, Rfl: 4    insulin glargine (LANTUS PEN) 100 UNIT/ML pen, Inject 18 Units subcutaneously at bedtime., Disp: 15 mL, Rfl: 1    levETIRAcetam (KEPPRA) 500 MG tablet, Take 1 tablet (500 mg) by mouth 2 times daily., Disp: 180 tablet, Rfl: 3    levothyroxine (SYNTHROID/LEVOTHROID) 100 MCG tablet, Take 1 tablet (100 mcg) by mouth every morning (before breakfast)., Disp: 60 tablet, Rfl: 1    Lidocaine (LIDOCARE) 4 % Patch, Place 1 patch over 12 hours onto the skin every 24 hours. To prevent lidocaine toxicity, patient should be patch free for 12 hrs daily., Disp: 20 patch, Rfl: 0    loratadine (CLARITIN) 10 MG tablet, Take 1 tablet (10 mg) by mouth daily., Disp: 90 tablet, Rfl: 3    methocarbamol (ROBAXIN) 500 MG tablet, Take 1 tablet (500 mg) by mouth daily as needed for muscle spasms., Disp: 30 tablet, Rfl: 0    PENTIPS 32G X 4 MM miscellaneous, Inject 1 each subcutaneously daily. Use 4 pen needles daily or as directed., Disp: , Rfl:     zinc oxide (DESITIN) 20 % external ointment, Apply topically as needed for dry skin or irritation, Disp: 85 g, Rfl: 3    Allergy:   Allergies   Allergen Reactions    Seasonal Allergies        Social History:   History   Smoking Status    Former    Types: Cigarettes   Smokeless Tobacco    Never      Social History     Tobacco Use    Smoking status: Former     Current packs/day: 0.00     Average packs/day: 0.5 packs/day for 35.0 years (17.5 ttl pk-yrs)     Types: Cigarettes     Start date: 1983     Quit date: 2018     Years since quittin.7    Smokeless tobacco: Never   Substance Use Topics    Alcohol use: Not Currently    Drug use: Not Currently        Family  History:   Family History   Problem Relation Age of Onset    Acute Myocardial Infarction Father     Heart Disease Maternal Grandmother     Dementia Maternal Grandmother     Myocardial Infarction Father     Dementia Paternal Grandmother     Heart Disease Paternal Grandmother        Physical Examination:  There were no vitals filed for this visit.  There is no height or weight on file to calculate BMI.    Well appearing, well nourished  Alert and oriented x 3, cooperative with exam     Left wrist  Skin intact  TTP, swelling, bruising over distal radius  Motor Exam: Intact TU/OK/x2-3  Sensory Exam: Sensation intact to light touch in FDWS (radial), volar IF (median), volar SF (ulnar)  Vascular Exam: fingers warm, well perfused    Imaging/Studies:  XR (3 views) of the left wrist was obtained 4/1/2025.  I reviewed the images with the patient.  The imaging study shows dorsally angulated intraarticular distal radius fracture (approx 30 degrees), ulnar styloid avulsion, vascular calcifications, CMC and STT degenerative changes. Similar to prior    XR (3 views) of the left wrist was obtained 3/23/2025.  I reviewed the images with the patient.  The imaging study shows dorsally angulated intraarticular distal radius fracture, ulnar styloid avulsion, vascular calcifications, CMC and STT degenerative changes.    Assessment: Isabell Matthews is a 64 year old female with left intraarticular distal radius fracture.    Plan:   I had a detailed discussion with the patient regarding my clinical findings, diagnosis, and treatment plan. I reviewed the treatment options for her distal radius fracture (immobilization, CRPP, ORIF, etc.) as well as the risks and benefits of each.  Due to the fracture displacement operative treatment would improve alignment and stabilize the fracture. However the patient has significant medical co-morbidities and has low functional demand. I reviewed with the patient the treatment course with regard to operative  fixation including surgical plan, post-operative pain management, follow-up frequency, and rehabilitation plan.  The patient understands that there are risks associated with operative treatment including but not limited to infection, bleeding, nerve injury, malunion/nonunion, hardware failure, post-operative stiffness, surgical scar, CRPS, anesthetic complications, etc.  We also discussed that there is a possibility that the surgery will not be successful and will require repeat surgery. I also reviewed with the patient, the option of non-operative treatment.   This would involve a splint or cast for a duration of 6-8 weeks followed by therapy for ROM.  I explained that non-operative treatment will not improve the alignment of the fracture, which can result in persistent pain and some degree of functional limitation with regard to ROM, however, the degree of which I can not predict, and with her low functional demand may be tolerable.  After a thorough discussion the patient/daughter have elected to pursue non-operative treatment. All questions answered.      NWB left upper extremity.   Short arm exos brace applied   Work on finger motion       Next Visit:   Follow-up: 3 weeks   Imaging: XR left wrist .   Plan: review imaging and symptoms.     ALVARADO POLANCO MD

## 2025-03-31 NOTE — PROGRESS NOTES
Physical Therapy Discharge Summary    Reason for therapy discharge:    Discharged to home with home therapy.    Progress towards therapy goal(s). See goals on [facility] Plan [facility] for goal details.  Goals not met.  Barriers to achieving goals:   discharge from facility.    Therapy recommendation(s):    Continued therapy is recommended.  Rationale/Recommendations:  Patient remains limited by orthostatic hypotension and inability to use their LUE. Has excellent support and all DME needs met at home. Recommend home with assist from PCAs and resumption of  services..

## 2025-04-01 ENCOUNTER — OFFICE VISIT (OUTPATIENT)
Dept: ORTHOPEDICS | Facility: CLINIC | Age: 65
End: 2025-04-01
Payer: COMMERCIAL

## 2025-04-01 ENCOUNTER — ANCILLARY PROCEDURE (OUTPATIENT)
Dept: GENERAL RADIOLOGY | Facility: CLINIC | Age: 65
End: 2025-04-01
Attending: STUDENT IN AN ORGANIZED HEALTH CARE EDUCATION/TRAINING PROGRAM
Payer: COMMERCIAL

## 2025-04-01 ENCOUNTER — PRE VISIT (OUTPATIENT)
Dept: ORTHOPEDICS | Facility: CLINIC | Age: 65
End: 2025-04-01

## 2025-04-01 ENCOUNTER — OFFICE VISIT (OUTPATIENT)
Dept: OPHTHALMOLOGY | Facility: CLINIC | Age: 65
End: 2025-04-01
Attending: FAMILY MEDICINE
Payer: COMMERCIAL

## 2025-04-01 VITALS — SYSTOLIC BLOOD PRESSURE: 192 MMHG | DIASTOLIC BLOOD PRESSURE: 90 MMHG

## 2025-04-01 DIAGNOSIS — H26.491 PCO (POSTERIOR CAPSULAR OPACIFICATION), RIGHT: ICD-10-CM

## 2025-04-01 DIAGNOSIS — M25.532 WRIST PAIN, LEFT: Primary | ICD-10-CM

## 2025-04-01 DIAGNOSIS — Z96.1 PSEUDOPHAKIA OF BOTH EYES: ICD-10-CM

## 2025-04-01 DIAGNOSIS — M25.532 WRIST PAIN, LEFT: ICD-10-CM

## 2025-04-01 DIAGNOSIS — H52.13 MYOPIA OF BOTH EYES: ICD-10-CM

## 2025-04-01 DIAGNOSIS — E10.59 TYPE 1 DIABETES MELLITUS WITH OTHER CIRCULATORY COMPLICATION (H): Primary | ICD-10-CM

## 2025-04-01 DIAGNOSIS — H52.223 REGULAR ASTIGMATISM OF BOTH EYES: ICD-10-CM

## 2025-04-01 DIAGNOSIS — I10 ESSENTIAL HYPERTENSION: ICD-10-CM

## 2025-04-01 PROCEDURE — 92015 DETERMINE REFRACTIVE STATE: CPT

## 2025-04-01 PROCEDURE — 1125F AMNT PAIN NOTED PAIN PRSNT: CPT | Performed by: STUDENT IN AN ORGANIZED HEALTH CARE EDUCATION/TRAINING PROGRAM

## 2025-04-01 PROCEDURE — 92250 FUNDUS PHOTOGRAPHY W/I&R: CPT

## 2025-04-01 PROCEDURE — G0463 HOSPITAL OUTPT CLINIC VISIT: HCPCS

## 2025-04-01 PROCEDURE — 99204 OFFICE O/P NEW MOD 45 MIN: CPT | Mod: 24 | Performed by: STUDENT IN AN ORGANIZED HEALTH CARE EDUCATION/TRAINING PROGRAM

## 2025-04-01 PROCEDURE — 73110 X-RAY EXAM OF WRIST: CPT | Mod: LT | Performed by: RADIOLOGY

## 2025-04-01 ASSESSMENT — SLIT LAMP EXAM - LIDS
COMMENTS: NORMAL
COMMENTS: NORMAL

## 2025-04-01 ASSESSMENT — REFRACTION_MANIFEST
OD_ADD: +2.50
OS_ADD: +2.50
OD_CYLINDER: +0.75
OD_SPHERE: -1.00
OS_CYLINDER: +0.50
OS_SPHERE: -0.25
OD_AXIS: 091
OS_SPHERE: -0.25
OS_CYLINDER: +0.50
OS_AXIS: 084
OD_SPHERE: -1.00
OS_AXIS: 084
METHOD_AUTOREFRACTION: 1
OD_AXIS: 090
OD_CYLINDER: +0.75

## 2025-04-01 ASSESSMENT — VISUAL ACUITY
OS_SC: 20/40
METHOD: SNELLEN - LINEAR
OD_SC: 20/50

## 2025-04-01 ASSESSMENT — EXTERNAL EXAM - LEFT EYE: OS_EXAM: NORMAL

## 2025-04-01 ASSESSMENT — TONOMETRY
OD_IOP_MMHG: 16
OS_IOP_MMHG: 14
IOP_METHOD: TONOPEN

## 2025-04-01 ASSESSMENT — CUP TO DISC RATIO
OS_RATIO: 0.2
OD_RATIO: 0.2

## 2025-04-01 ASSESSMENT — EXTERNAL EXAM - RIGHT EYE: OD_EXAM: NORMAL

## 2025-04-01 NOTE — PROGRESS NOTES
DME FITTING    Relevant Diagnosis: Left distal radius fracture   Exos, fx brace, short arm, open thumb, LT, sm brace was fit on patient's Left wrist.     Person(s) involved in teaching:   Patient and Daughter    Brace was applied in standard Manner:  Yes  Brace fit well:  Yes  Patient reports brace to fit comfortably:  Yes    Education:   Patient shown self application and removal of brace: Yes  Patient shown how to adjust brace fit, if necessary: Yes  Patient educated on billing and return policy: Yes  Patient confirmed understanding when and how to contact clinic with concerns: Yes

## 2025-04-01 NOTE — PROGRESS NOTES
History  HPI    Fell 8-9 days ago on left eyelid/temple area onto chris (over concrete) - denies concussion/cranial fractures - hospitallized 1 wk.   Denies flashes, floaters, curtain, current pain 0/10.   Vision stable ou before the accident and denies vision change since.     (Daughter reports had orthostatic hypotension last week- and is concerned some of the tests may be inaccurate - daughter asked if we could have BP checked to see where she is at/  /90 twice    Gtts: none    Carolina GARCÍA, April 1, 2025 2:25 PM                    Lab Results       Component                Value               Date                       A1C                      8.8                 03/23/2025                 A1C                      9.6                 01/16/2025                 A1C                      11.2                09/23/2024                 A1C                      10.2                05/20/2024                 A1C                      10.7                01/29/2024                 A1C                      7.8                 06/21/2021                 A1C                      11.7                09/27/2020                  Carolina GARCÍA, April 1, 2025 2:12 PM        Last edited by Carolina Peña COA on 4/1/2025  2:38 PM.          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   -No Diabetic Retinopathy noted in this exam.   -Stressed importance of close BS/BP monitoring, diet/exercise, medication compliance, and regular visits with PCP.   -Discussed getting yearly eye exams.   -Monitor annually.      (I10) Essential hypertension  Plan:   Patient has a history or orthostatic hypotension.  -High BP today. Patinet is not symptomatic for headaches, light headness, chest pain, or shortness of breath  -Patient has an appointment with her PCP tomorrow.  -Discussed if any symptoms go straight to  the ED.    (Z96.1) Pseudophakia of both eyes  Plan:   -Good Post-operative Appearance.   -Monitor.    (H26.491) PCO (posterior capsular opacification), right  Plan:   -2+ PCO right eye  -Visually Significant PCO  -Discussed risk and benefits of YAG.  -Return to clinic for YAG right eye.     (H52.13) Myopia of both eyes, (H52.223) Regular astigmatism of both eyes  Plan:   -Mrx Done Today. Will hold off on giving Glasses Rx until PCO is removed  -Return to Clinic for Refraction after YAG right eye.    Return to clinic with ophthalmology for YAG right eye Next Avaliable.     Complete documentation of historical and exam elements from today's encounter can be found in the full encounter summary report (not reduplicated in this progress note). I personally obtained the chief complaint(s) and history of present illness. I confirmed and edited as necessary the review of systems, past medical/surgical history, family history, social history, and examination findings as document by others; and I examined the patient myself. I personally reviewed the relevant tests, images, and reports as documented above. I formulated and edited as necessary the assessment and plan and discussed the findings and management plan with the patient and family.    Santana Reilly OD

## 2025-04-01 NOTE — PATIENT INSTRUCTIONS
The affects of the dilating drops last for 4- 6 hours.  You will be more sensitive to light and vision will be blurry up close.  Do not drive if you do not feel comfortable.  Mydriatic sunglasses were given if needed.     Patient Education   Diabetes weakens the blood vessels all over the body, including the eyes. Damage to the blood vessels in the eyes can cause swelling or bleeding into part of the eye (called the retina). This is called diabetic retinopathy (JONAS-tin--[facility]-thee). If not treated, this disease can cause vision loss or blindness.   Symptoms may include blurred or distorted vision, but many people have no symptoms. It's important to see your eye doctor regularly to check for problems.   Early treatment and good control can help protect your vision. Here are the things you can do to help prevent vision loss:      1. Keep your blood sugar levels under tight control.      2. Bring high blood pressure under control.      3. No smoking.      4. Have yearly dilated eye exams.

## 2025-04-01 NOTE — NURSING NOTE
Reason For Visit:   Chief Complaint   Patient presents with    Consult     Fracture of left distal radius. DOI: 3/23/25       Primary MD: Ronnie Kellogg  Ref. MD: Afua    Age: 64 year old    ?  No      There were no vitals taken for this visit.      Pain Assessment  Patient Currently in Pain: Yes  0-10 Pain Scale: 6  Primary Pain Location: Wrist (Left)               QuickDASH Assessment       No data to display                   Current Outpatient Medications   Medication Sig Dispense Refill    acetaminophen (TYLENOL) 325 MG tablet Take 3 tablets (975 mg) by mouth every 8 hours.      aspirin (ASA) 81 MG chewable tablet Take 1 tablet (81 mg) by mouth daily 90 tablet 3    atorvastatin (LIPITOR) 40 MG tablet Take 1 tablet (40 mg) by mouth daily. 90 tablet 3    blood glucose (NO BRAND SPECIFIED) test strip Use to test blood sugar 4 times daily or as directed. 360 strip 3    blood glucose monitoring (ONE TOUCH ULTRA MINI) meter device kit Use to test blood sugar 4 times daily or as directed. 1 kit 0    chlorhexidine (PERIDEX) 0.12 % solution Take 30 mLs by mouth daily. Swish and spit 30 mL once daily for 14 days.Swish and spit 30 mL once daily for 14 days. 473 mL 3    Continuous Glucose Sensor (FREESTYLE MARIA FERNANDA 2 SENSOR) MISC Change every 14 days. 4 each 11    cyanocobalamin (VITAMIN B-12) 1000 MCG tablet Take 1 tablet (1,000 mcg) by mouth daily 100 tablet 3    diclofenac (VOLTAREN) 1 % topical gel Apply 2 g topically 4 times daily. 150 g 2    DULoxetine (CYMBALTA) 20 MG capsule TAKE 1 CAPSULE(20 MG) BY MOUTH DAILY 90 capsule 3    gabapentin (NEURONTIN) 100 MG capsule Take 1 capsule (100 mg) by mouth at bedtime. 90 capsule 3    Glucagon (GVOKE HYPOPEN) 1 MG/0.2ML pen Inject the contents of 1 device under the skin into lower abdomen, outer thigh, or outer upper arm as needed for hypoglycemia. If no response after 15 minutes, additional 1 mg dose from a new device may be injected while waiting for emergency  assistance. 2 each 1    hydrALAZINE (APRESOLINE) 10 MG tablet Take 1 tablet (10 mg) by mouth 2 times daily. 180 tablet 0    hydrALAZINE (APRESOLINE) 25 MG tablet Take 1 tablet (25 mg) by mouth at bedtime. 90 tablet 0    insulin aspart (NOVOLOG FLEXPEN) 100 UNIT/ML pen Novolog Flexpen: 1 unit per 15 grams of carbohydrates with meals 15 mL 2    insulin aspart (NOVOLOG FLEXPEN) 100 UNIT/ML pen Inject 2 units with meals plus correction scale additional three times daily before meals  as follows  Pre-Meal  - 200 give additional 1 unit.  For Pre-Meal  -250 give 2 additional units.  For Pre-Meal -300 give 3 additional units.  For Pre-Meal -350 give 4 additional units.  For Pre-Meal -400 give 5 additional units Max 25 units daily (Patient taking differently: Inject 2 units with meals plus correction scale additional three times daily before meals  as follows  Pre-Meal  - 200 give additional 1 unit.  For Pre-Meal  -250 give 2 additional units.  For Pre-Meal -300 give 3 additional units.  For Pre-Meal -350 give 4 additional units.  For Pre-Meal -400 give 5 additional units Max 20 units daily.) 30 mL 4    insulin glargine (LANTUS PEN) 100 UNIT/ML pen Inject 18 Units subcutaneously at bedtime. 15 mL 1    levETIRAcetam (KEPPRA) 500 MG tablet Take 1 tablet (500 mg) by mouth 2 times daily. 180 tablet 3    levothyroxine (SYNTHROID/LEVOTHROID) 100 MCG tablet Take 1 tablet (100 mcg) by mouth every morning (before breakfast). 60 tablet 1    Lidocaine (LIDOCARE) 4 % Patch Place 1 patch over 12 hours onto the skin every 24 hours. To prevent lidocaine toxicity, patient should be patch free for 12 hrs daily. 20 patch 0    loratadine (CLARITIN) 10 MG tablet Take 1 tablet (10 mg) by mouth daily. 90 tablet 3    methocarbamol (ROBAXIN) 500 MG tablet Take 1 tablet (500 mg) by mouth daily as needed for muscle spasms. 30 tablet 0    PENTIPS 32G X 4 MM miscellaneous Inject 1 each  subcutaneously daily. Use 4 pen needles daily or as directed.      zinc oxide (DESITIN) 20 % external ointment Apply topically as needed for dry skin or irritation 85 g 3       Allergies   Allergen Reactions    Seasonal Allergies        Beverly Mathew, ATC

## 2025-04-01 NOTE — LETTER
4/1/2025      Isabell Matthews  [address]  [place] [address]      Dear Colleague,    Thank you for referring your patient, Isabell Matthews, to the [facility] [place]. Please see a copy of my visit note below.    Chief Complaint:   Chief Complaint   Patient presents with     Consult     Fracture of left distal radius. DOI: 3/23/25       Referring Physician: No ref. provider found    Date of Injury: 3/23/2025  Mechanism of Injury: fall  Diagnosis: left distal radius fracture with dorsal angulation  Treatment: splint    History of Present Illness: Isabell Matthews is a 64 year old female presenting for evaluation of left wrist pain and fracture. Just got home from the hospital 2 days ago. Pain is controlled in the brace. The patient has a history of stroke and vascular dementia, and is currently having difficulty with blood pressure management. No finger numbness    Clinical documentation by Dr. Granados on 3/30/2025 was reviewed.    Past Medical History:   Past Medical History:   Diagnosis Date     Chronic pain      Coronary atherosclerosis 06/14/2016     Essential hypertension      Ex-cigarette smoker     quit 7/2018 over 35 years     Ex-cigarette smoker      Hyperlipidemia      Hypothyroid      Seizures (H)      Stroke (H)      Vascular dementia (H)        Past Surgical History:   Past Surgical History:   Procedure Laterality Date     athroplasty hip Bilateral     2003, 2006     CATARACT IOL, RT/LT       OTHER SURGICAL HISTORY      athroplasty hip     PICC DOUBLE LUMEN PLACEMENT Right 06/11/2023    right basilic 5 fr dl power picc 39 cm     STENT         Medications:   Current Outpatient Medications:      acetaminophen (TYLENOL) 325 MG tablet, Take 3 tablets (975 mg) by mouth every 8 hours., Disp: , Rfl:      aspirin (ASA) 81 MG chewable tablet, Take 1 tablet (81 mg) by mouth daily, Disp: 90 tablet, Rfl: 3     atorvastatin (LIPITOR) 40 MG tablet, Take 1 tablet (40 mg) by mouth daily.,  Disp: 90 tablet, Rfl: 3     blood glucose (NO BRAND SPECIFIED) test strip, Use to test blood sugar 4 times daily or as directed., Disp: 360 strip, Rfl: 3     blood glucose monitoring (ONE TOUCH ULTRA MINI) meter device kit, Use to test blood sugar 4 times daily or as directed., Disp: 1 kit, Rfl: 0     chlorhexidine (PERIDEX) 0.12 % solution, Take 30 mLs by mouth daily. Swish and spit 30 mL once daily for 14 days.Swish and spit 30 mL once daily for 14 days., Disp: 473 mL, Rfl: 3     Continuous Glucose Sensor (FREESTYLE MARIA FERNANDA 2 SENSOR) [facility] [place] Change every 14 days., Disp: 4 each, Rfl: 11     cyanocobalamin (VITAMIN B-12) 1000 MCG tablet, Take 1 tablet (1,000 mcg) by mouth daily, Disp: 100 tablet, Rfl: 3     diclofenac (VOLTAREN) 1 % topical gel, Apply 2 g topically 4 times daily., Disp: 150 g, Rfl: 2     DULoxetine (CYMBALTA) 20 MG capsule, TAKE 1 CAPSULE(20 MG) BY MOUTH DAILY, Disp: 90 capsule, Rfl: 3     gabapentin (NEURONTIN) 100 MG capsule, Take 1 capsule (100 mg) by mouth at bedtime., Disp: 90 capsule, Rfl: 3     Glucagon (GVOKE HYPOPEN) 1 MG/0.2ML pen, Inject the contents of 1 device under the skin into lower abdomen, outer thigh, or outer upper arm as needed for hypoglycemia. If no response after 15 minutes, additional 1 mg dose from a new device may be injected while waiting for emergency assistance., Disp: 2 each, Rfl: 1     hydrALAZINE (APRESOLINE) 10 MG tablet, Take 1 tablet (10 mg) by mouth 2 times daily., Disp: 180 tablet, Rfl: 0     hydrALAZINE (APRESOLINE) 25 MG tablet, Take 1 tablet (25 mg) by mouth at bedtime., Disp: 90 tablet, Rfl: 0     insulin aspart (NOVOLOG FLEXPEN) 100 UNIT/ML pen, Novolog Flexpen: 1 unit per 15 grams of carbohydrates with meals, Disp: 15 mL, Rfl: 2     insulin aspart (NOVOLOG FLEXPEN) 100 UNIT/ML pen, Inject 2 units with meals plus correction scale additional three times daily before meals  as follows  Pre-Meal  - 200 give additional 1 unit.  For Pre-Meal  -250 give 2  additional units.  For Pre-Meal -300 give 3 additional units.  For Pre-Meal -350 give 4 additional units.  For Pre-Meal -400 give 5 additional units Max 25 units daily (Patient taking differently: Inject 2 units with meals plus correction scale additional three times daily before meals  as follows  Pre-Meal  - 200 give additional 1 unit.  For Pre-Meal  -250 give 2 additional units.  For Pre-Meal -300 give 3 additional units.  For Pre-Meal -350 give 4 additional units.  For Pre-Meal -400 give 5 additional units Max 20 units daily.), Disp: 30 mL, Rfl: 4     insulin glargine (LANTUS PEN) 100 UNIT/ML pen, Inject 18 Units subcutaneously at bedtime., Disp: 15 mL, Rfl: 1     levETIRAcetam (KEPPRA) 500 MG tablet, Take 1 tablet (500 mg) by mouth 2 times daily., Disp: 180 tablet, Rfl: 3     levothyroxine (SYNTHROID/LEVOTHROID) 100 MCG tablet, Take 1 tablet (100 mcg) by mouth every morning (before breakfast)., Disp: 60 tablet, Rfl: 1     Lidocaine (LIDOCARE) 4 % Patch, Place 1 patch over 12 hours onto the skin every 24 hours. To prevent lidocaine toxicity, patient should be patch free for 12 hrs daily., Disp: 20 patch, Rfl: 0     loratadine (CLARITIN) 10 MG tablet, Take 1 tablet (10 mg) by mouth daily., Disp: 90 tablet, Rfl: 3     methocarbamol (ROBAXIN) 500 MG tablet, Take 1 tablet (500 mg) by mouth daily as needed for muscle spasms., Disp: 30 tablet, Rfl: 0     PENTIPS 32G X 4 MM miscellaneous, Inject 1 each subcutaneously daily. Use 4 pen needles daily or as directed., Disp: , Rfl:      zinc oxide (DESITIN) 20 % external ointment, Apply topically as needed for dry skin or irritation, Disp: 85 g, Rfl: 3    Allergy:   Allergies   Allergen Reactions     Seasonal Allergies        Social History:   History   Smoking Status     Former     Types: Cigarettes   Smokeless Tobacco     Never      Social History     Tobacco Use     Smoking status: Former     Current packs/day: 0.00      Average packs/day: 0.5 packs/day for 35.0 years (17.5 ttl pk-yrs)     Types: Cigarettes     Start date: 1983     Quit date: 2018     Years since quittin.7     Smokeless tobacco: Never   Substance Use Topics     Alcohol use: Not Currently     Drug use: Not Currently        Family History:   Family History   Problem Relation Age of Onset     Acute Myocardial Infarction Father      Heart Disease Maternal Grandmother      Dementia Maternal Grandmother      Myocardial Infarction Father      Dementia Paternal Grandmother      Heart Disease Paternal Grandmother        Physical Examination:  There were no vitals filed for this visit.  There is no height or weight on file to calculate BMI.    Well appearing, well nourished  Alert and oriented x 3, cooperative with exam     Left wrist  Skin intact  TTP, swelling, bruising over distal radius  Motor Exam: Intact TU/OK/x2-3  Sensory Exam: Sensation intact to light touch in FDWS (radial), volar IF (median), volar SF (ulnar)  Vascular Exam: fingers warm, well perfused    Imaging/Studies:  XR (3 views) of the left wrist was obtained 2025.  I reviewed the images with the patient.  The imaging study shows dorsally angulated intraarticular distal radius fracture (approx 30 degrees), ulnar styloid avulsion, vascular calcifications, CMC and STT degenerative changes. Similar to prior    XR (3 views) of the left wrist was obtained 3/23/2025.  I reviewed the images with the patient.  The imaging study shows dorsally angulated intraarticular distal radius fracture, ulnar styloid avulsion, vascular calcifications, CMC and STT degenerative changes.    Assessment: Isabell Matthews is a 64 year old female with left intraarticular distal radius fracture.    Plan:   I had a detailed discussion with the patient regarding my clinical findings, diagnosis, and treatment plan. I reviewed the treatment options for her distal radius fracture (immobilization, CRPP, ORIF, etc.) as well as the  risks and benefits of each.  Due to the fracture displacement operative treatment would improve alignment and stabilize the fracture. However the patient has significant medical co-morbidities and has low functional demand. I reviewed with the patient the treatment course with regard to operative fixation including surgical plan, post-operative pain management, follow-up frequency, and rehabilitation plan.  The patient understands that there are risks associated with operative treatment including but not limited to infection, bleeding, nerve injury, malunion/nonunion, hardware failure, post-operative stiffness, surgical scar, CRPS, anesthetic complications, etc.  We also discussed that there is a possibility that the surgery will not be successful and will require repeat surgery. I also reviewed with the patient, the option of non-operative treatment.   This would involve a splint or cast for a duration of 6-8 weeks followed by therapy for ROM.  I explained that non-operative treatment will not improve the alignment of the fracture, which can result in persistent pain and some degree of functional limitation with regard to ROM, however, the degree of which I can not predict, and with her low functional demand may be tolerable.  After a thorough discussion the patient/daughter have elected to pursue non-operative treatment. All questions answered.       NWB left upper extremity.    Short arm exos brace applied    Work on finger motion       Next Visit:    Follow-up: 3 weeks    Imaging: XR left wrist .    Plan: review imaging and symptoms.     ALVARADO POLANCO MD      DME FITTING    Relevant Diagnosis: Left distal radius fracture   Exos, fx brace, short arm, open thumb, LT, sm brace was fit on patient's Left wrist.     Person(s) involved in teaching:   Patient and Daughter    Brace was applied in standard Manner:  Yes  Brace fit well:  Yes  Patient reports brace to fit comfortably:  Yes    Education:   Patient shown  self application and removal of brace: Yes  Patient shown how to adjust brace fit, if necessary: Yes  Patient educated on billing and return policy: Yes  Patient confirmed understanding when and how to contact clinic with concerns: Yes      Again, thank you for allowing me to participate in the care of your patient.        Sincerely,        ALVARADO POLANCO MD    Electronically signed

## 2025-04-01 NOTE — NURSING NOTE
Chief Complaints and History of Present Illnesses   Patient presents with    Diabetic Eye Exam Follow Up     Chief Complaint(s) and History of Present Illness(es)       Diabetic Eye Exam Follow Up               Comments    Fell 8-9 days ago on left eyelid/temple area onto chris (over concrete) - denies concussion/cranial fractures - hospitallized 1 wk.   Denies flashes, floaters, curtain, current pain 0/10.   Vision stable ou before the accident and denies vision change since.     (Daughter reports had orthostatic hypotension last week- and is concerned some of the tests may be inaccurate - daughter asked if we could have BP checked to see where she is at/)    Gtts: none    Carolina GARCÍA, April 1, 2025 2:25 PM                    Lab Results       Component                Value               Date                       A1C                      8.8                 03/23/2025                 A1C                      9.6                 01/16/2025                 A1C                      11.2                09/23/2024                 A1C                      10.2                05/20/2024                 A1C                      10.7                01/29/2024                 A1C                      7.8                 06/21/2021                 A1C                      11.7                09/27/2020                  Carolina GARCÍA, April 1, 2025 2:12 PM

## 2025-04-02 ENCOUNTER — TELEPHONE (OUTPATIENT)
Dept: FAMILY MEDICINE | Facility: CLINIC | Age: 65
End: 2025-04-02

## 2025-04-02 ENCOUNTER — MEDICAL CORRESPONDENCE (OUTPATIENT)
Dept: HEALTH INFORMATION MANAGEMENT | Facility: CLINIC | Age: 65
End: 2025-04-02

## 2025-04-02 ENCOUNTER — VIRTUAL VISIT (OUTPATIENT)
Dept: INTERNAL MEDICINE | Facility: CLINIC | Age: 65
End: 2025-04-02
Payer: COMMERCIAL

## 2025-04-02 DIAGNOSIS — R09.89 LABILE BLOOD PRESSURE: Primary | ICD-10-CM

## 2025-04-02 DIAGNOSIS — Z60.8 OTHER PROBLEMS RELATED TO SOCIAL ENVIRONMENT: ICD-10-CM

## 2025-04-02 DIAGNOSIS — S52.509S CLOSED FRACTURE OF DISTAL END OF RADIUS, UNSPECIFIED FRACTURE MORPHOLOGY, UNSPECIFIED LATERALITY, SEQUELA: ICD-10-CM
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
## 2025-04-02 DIAGNOSIS — R56.9 SEIZURES (H): ICD-10-CM

## 2025-04-02 DIAGNOSIS — E03.9 ACQUIRED HYPOTHYROIDISM: ICD-10-CM

## 2025-04-02 DIAGNOSIS — F51.3: ICD-10-CM

## 2025-04-02 PROCEDURE — 98007 SYNCH AUDIO-VIDEO EST HI 40: CPT | Performed by: PEDIATRICS

## 2025-04-02 PROCEDURE — 1125F AMNT PAIN NOTED PAIN PRSNT: CPT | Performed by: PEDIATRICS

## 2025-04-02 RX ORDER — LEVOTHYROXINE SODIUM 100 UG/1
100 TABLET ORAL
Qty: 60 TABLET | Refills: 1 | Status: SHIPPED | OUTPATIENT
Start: 2025-04-02 | End: 2025-04-02

## 2025-04-02 RX ORDER — LEVETIRACETAM 250 MG/1
250 TABLET ORAL EVERY MORNING
Qty: 60 TABLET | Refills: 0 | Status: SHIPPED | OUTPATIENT
Start: 2025-04-02

## 2025-04-02 RX ORDER — LEVOTHYROXINE SODIUM 100 UG/1
100 TABLET ORAL
Qty: 60 TABLET | Refills: 1 | Status: SHIPPED | OUTPATIENT
Start: 2025-04-02

## 2025-04-02 RX ORDER — LEVETIRACETAM 500 MG/1
500 TABLET ORAL EVERY EVENING
Qty: 60 TABLET | Refills: 1 | Status: SHIPPED | OUTPATIENT
Start: 2025-04-02

## 2025-04-02 SDOH — SOCIAL STABILITY - SOCIAL INSECURITY: OTHER PROBLEMS RELATED TO SOCIAL ENVIRONMENT: Z60.8

## 2025-04-02 ASSESSMENT — PATIENT HEALTH QUESTIONNAIRE - PHQ9: SUM OF ALL RESPONSES TO PHQ QUESTIONS 1-9: 14

## 2025-04-02 NOTE — PATIENT INSTRUCTIONS
Thank you for visiting the Primary Care Center today at the [facility]! The following is some information about our clinic:     Primary Care Center Frequently-Asked Questions    (1) How do I schedule appointments at the [facility]?     Primary Care--to schedule or make changes to an existing appointment, please call our primary care line at [phone].    Labs--to schedule a lab appointment at the [facility] you can use [employer] or call [phone]. If you have a [facility] location that is closer to home, you can reach out to that location for scheduling options.     Imaging--if you need to schedule a CT, X-ray, MRI, ultrasound, or other imaging study you can call [phone] to schedule at the [facility] or any other [facility] imaging location.     Referrals--if a referral to another specialty was ordered you can expect a phone call from their scheduling team. If you have not heard from them in a week, please call us or send us a [employer] message to check the status or get a scheduling number. Please note that this only applies to internal [facility] referrals. If the referral is external you would need to contact their office for scheduling.     (2) I have a question about my visit, who do I contact?     You can call us at the primary care line at [phone] to ask questions about your visit. You can also send a secure message through [employer], which is reviewed by clinic staff. Please note that [employer] messages have a twenty-four to forty-eight business hour turnaround time and should not be used for urgent concerns.    (3) How will I get the results of my tests?    If you are signed up for [employer] all tests will be released to you within twenty-four hours of resulting. Please allow three to five days for your doctor to review your results and place a note interpreting the results. If you do not have [email or web address] you will receive your  results through mail seven to ten business days following the return of the tests. Please note that if there should be any urgent or concerning results that your doctor or their registered nurse will reach out to you the same day as the tests come back. If you have follow up questions about your results or would like to discuss the results in detail please schedule a follow up with your provider either in person or virtually.     (4) How do I get refills of my prescriptions?     You should always first contact your pharmacy for refills of your medications. If submitting a refill request on [employer], please be sure to submit the request only once--repeat requests can cause delays in refill. If you are requesting a NEW medication or a medication related to new symptoms you will need to schedule an appointment with a provider prior to approval. Please note: Routine medication refills have up to one to three business day turnaround whereas controlled substances refills have up to five to seven business day turnaround.    (5) I have new symptoms, what do I do?     If you are having an immediate medical emergency, you should dial 911 for assistance.   For anything urgent that needs to be seen within a few hours to one day you should visit a local urgent care for assistance.  For non-urgent symptoms that need to be seen within a few days to a week you can schedule with an available provider in primary care by going to [employer] or calling [phone].   If you are not sure how serious your symptoms are or you would like to receive medical advice you can always call [phone] to speak with a triage nurse.

## 2025-04-02 NOTE — PROGRESS NOTES
"Dear patient. Thank you for visiting with me. I want you to feel respected, understood, and empowered. \"Respect\" is valuing you as much as I would a close family member. \"Empowerment\" happens when you are fully informed, and can make the best possible decision for you.  Please ask me questions!  Challenge anything that is not clear.    Medical records are primarily used as memory aids for me and my colleagues. Things to know about my documentation style:  - The 'problem list' includes current symptoms or diagnoses, and some problems that are resolved but may return. I use the past medical history for problems not expected to return.  - I use single quotation marks for things that you or I said, when I want to clarify who was speaking.  - I use double quotation marks when copying a term from elsewhere in your records. Italics (besides here) may also denote a quotation.  If you have questions or concerns, please contact me; I will reply as soon as time allows.    Context    PCP: Ronnie Kellogg   Visit type: overview of problems, with several issues identified by patient discussed within limits of available time    Isabell Matthews is a 64 year old woman, seen with her daughter, with concerns including:  Chief Complaint   Patient presents with    Chronic Disease Management       History, update, and/or problems      Closed fracture of distal end of radius, unspecified fracture morphology, unspecified laterality, sequela:  - Left wrist fracture with significant pain, currently managed with brace.  - Continue using brace. Apply Voltaren gel once daily. Monitor pain levels.    Blood Pressure Management  - Previously on carvedilol, but medication was stopped after the fall and replaced with another medication, possibly causing current issues.  - Blood pressure was high in the hospital, reaching 175 when lying down and dropping to 117 when sitting.  - Not taking amlodipine for 4-5 months prior to the fall, as blood pressure " was controlled without it.       ASSESSMENT and PLAN:       Dizziness and Falls  Variable BP  - Presented with dizziness and falls, leading to a left wrist fracture.  - Dizziness has been ongoing for a couple of months, reportedly worsening. Possible relation to ear crystals and blood pressure issues.  - Blood pressure was well-controlled at home prior to the fall, ranging from 120 to 130.  - Experienced orthostatic hypotension, with significant drops in blood pressure when changing positions.  - Had a history of dizziness before a stroke last summer, which caused facial weakness.  - Recent in-home care tests showed less severe orthostatic hypotension compared to current levels.  - Possible UTI around the time of the fall, but hospital sample was contaminated, leaving diagnosis uncertain.  - Had a hemorrhagic stroke a couple of years ago, causing facial weakness and issues with liquid swallowing.  - No confirmed seizures, but was on Keppra for possible TIAs after initial strokes.       ASSESSMENT and PLAN:   - Blood pressure fluctuating significantly, with readings ranging from 192 to 130. Possible dehydration contributing to orthostatic changes.  - Monitor blood pressure at varied times daily. Consider increasing hydralazine dosage if needed. Send blood pressure readings to [facility].      Left Wrist Fracture       ASSESSMENT and PLAN:   - Fracture occurred due to a fall.  - Initially considered surgery but opted for a brace instead.  - Pain level reported as 8 earlier in the day, but improved with brace use and ice application.  - Using Tylenol and Voltaren gel for pain management.      Other problems related to social environment:  - Challenges in caregiving and potential need for additional support.  - Discuss options with [facility] . Consider day center for patient.  Nocturnal wandering: Occasional wandering at night without walker.  - Consider bed alarm to alert caregiver.    Acquired  hypothyroidism:  - Elevated TSH level at 37, possibly due to physical stress.  - Increase Synthroid to 100 mcg. Monitor TSH levels in 6-8 weeks. Prescribe brand-only levothyroxine.      Time note (e5, 40'): The total time (on the date of service) for this service was 51 minutes, including discussion/face-to-face, chart review, interpretation not otherwise reported, documentation, and updating of the computerized record.    Start Time: 3:00  End Time:3:44 PM      Depression Response    Patient completed the PHQ-9 assessment for depression and scored >9? Yes  Question 9 on the PHQ-9 was positive for suicidality? No  Does patient have current mental health provider? No    Is this a virtual visit? Yes   Does patient have suicidal ideation (positive question 9)? No - offer to place Mental Health Referral.  Patient declined referral/not needed    I personally notified the following: visit provider    Isabell is a 64 year old who is being evaluated via a billable video visit.    How would you like to obtain your AVS? MyChart  If the video visit is dropped, the invitation should be resent by: Text to cell phone: [phone]  Will anyone else be joining your video visit? No        Are refills needed on medications prescribed by this physician? YES Brand-only Synthroid 100mcg      Subjective   Isabell is a 64 year old, presenting for the following health issues:  Chronic Disease Management      HPI Isabell LUU Cassie, age: 64 years    Dizziness and Falls  - Presented with dizziness and falls, leading to a left wrist fracture.  - Dizziness has been ongoing for a couple of months, reportedly worsening. Possible relation to ear crystals and blood pressure issues.  - Blood pressure was well-controlled at home prior to the fall, ranging from 120 to 130.  - Experienced orthostatic hypotension, with significant drops in blood pressure when changing positions.  - Had a history of dizziness before a stroke last summer, which caused facial  "weakness.  - Recent in-home care tests showed less severe orthostatic hypotension compared to current levels.  - Possible UTI around the time of the fall, but hospital sample was contaminated, leaving diagnosis uncertain.    Left Wrist Fracture  - Fracture occurred due to a fall.  - Initially considered surgery but opted for a brace instead.  - Pain level reported as 8 earlier in the day, but improved with brace use and ice application.  - Using Tylenol and Voltaren gel for pain management.    Blood Pressure Management  - Previously on carvedilol, but medication was stopped after the fall and replaced with another medication, possibly causing current issues.  - Blood pressure was high in the hospital, reaching 175 when lying down and dropping to 117 when sitting.  - Not taking amlodipine for 4-5 months prior to the fall, as blood pressure was controlled without it.    Stroke History  - Had a hemorrhagic stroke a couple of years ago, causing facial weakness and issues with liquid swallowing.  - No confirmed seizures, but was on Keppra for possible TIAs after initial strokes.          Objective    Vitals - Patient Reported  Systolic (Patient Reported): (!) 172 (Laying - 172/88 Sitting -160/80 standing -132/80)  Diastolic (Patient Reported): 88  Weight (Patient Reported):  (Not recently)  Height (Patient Reported): 160 cm (5' 3\")  Pain Score: Severe Pain (8)  Pain Loc: Wrist      Vitals:  No vitals were obtained today due to virtual visit.    Physical Exam  Constitutional:       General: She is not in acute distress.     Appearance: Normal appearance. She is not ill-appearing.   HENT:      Head: Normocephalic.      Right Ear: External ear normal.      Left Ear: External ear normal.      Nose: Nose normal.   Eyes:      General: No scleral icterus (no obvious).        Right eye: No discharge (none obvious).         Left eye: No discharge (none obvious).      Extraocular Movements: Extraocular movements intact. "   Pulmonary:      Effort: Pulmonary effort is normal. No respiratory distress.      Comments: No audible wheeze or stridor. No visible cyanosis.  Skin:     Comments: Visible skin is clear, without obvious rash or lesions   Neurological:      Mental Status: She is alert.      Comments: Cranial nerves appear grossly normal   Psychiatric:         Attention and Perception: She is inattentive.         Speech: She is noncommunicative. Speech is delayed.              Video-Visit Details    Type of service:  Video Visit   Originating Location (pt. Location): Home    Distant Location (provider location):  Off-site  Platform used for Video Visit: Aubree  Signed Electronically by: Omkar Davis MD

## 2025-04-02 NOTE — TELEPHONE ENCOUNTER
Health Call Center    Phone Message    May a detailed message be left on voicemail: yes     Reason for Call: Order(s): Home Care Orders: Skilled Nursing: once a week for four weeks, every other week for four weeks, once a week for one week. PT and OT and MSW to eval and treat. Please call and advise.

## 2025-04-03 ENCOUNTER — PATIENT OUTREACH (OUTPATIENT)
Dept: CARE COORDINATION | Facility: CLINIC | Age: 65
End: 2025-04-03
Payer: COMMERCIAL

## 2025-04-03 ENCOUNTER — TELEPHONE (OUTPATIENT)
Dept: FAMILY MEDICINE | Facility: CLINIC | Age: 65
End: 2025-04-03
Payer: COMMERCIAL

## 2025-04-03 NOTE — TELEPHONE ENCOUNTER
Spoke with Mary Lou with [facility] and gave the following verbal order(s): Home Care Orders: Skilled Nursing: once a week for four weeks, every other week for four weeks, once a week for one week. PT and OT and MSW to eval and treat.  Andie AGUILAR LPN  [facility]

## 2025-04-03 NOTE — PROGRESS NOTES
Clinic Care Coordination Contact  [facility]/Voicemail    Clinical Data: Care Coordinator Outreach    Outreach Documentation Number of Outreach Attempt   4/3/2025  10:26 AM 1       Unable to leave a message due to: patients daughter answered and patient was sleeping. Requested for a callback this afternoon.       Plan: Care Coordinator will try to reach patient again this afternoon.    Florence KOENIG Community Health Worker  Clinic Care Coordination  [facility]   Clinic #: [phone]  Direct #: [phone]

## 2025-04-03 NOTE — PROGRESS NOTES
"Clinic Care Coordination Contact  Community Health Worker Initial Outreach    Patient accepts CC: No, duplicate services provided to patient. Patient will be sent Care Coordination introduction letter for future reference.         Chief Complaint   Patient presents with    Clinic Care Coordination - Initial    Clinic Care Coordination - Post Hospital       Most Recent Admission Date: 3/23/2025   Most Recent Admission Diagnosis: Elevated troponin - R79.89  Acute cystitis without hematuria - N30.00  Wrist fracture, left, closed, initial encounter - S62.102A     Most Recent Discharge Date: 3/30/2025   Most Recent Discharge Diagnosis: Elevated troponin - R79.89  Wrist fracture, left, closed, initial encounter - S62.102A  Acute cystitis without hematuria - N30.00  Dehydration - E86.0  Oth intartic fracture of lower end of left radius, init - S52.572A  Closed displaced fracture of styloid process of left ulna, initial encounter - S52.612A  Left elbow pain - M25.522  Nonintractable headache, unspecified chronicity pattern, unspecified headache type - R51.9  Cervicalgia - M54.2  Pain in thoracic spine - M54.6  Low back pain, unspecified back pain laterality, unspecified chronicity, unspecified whether sciatica present - M54.50  Fall on same level from slipping, tripping or stumbling, initial encounter - W01.0XXA  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 (Red " Flag symptoms escalate to triage hotline per guidelines): Unchanged (pain is manageable)  Do you know how to contact your clinic care team if you have future questions or changes to your health status? : Yes (in home care - saw nurse yesterday)  Does the patient have their discharge instructions? : Yes  Does the patient have questions regarding their discharge instructions? : No  Were you started on any new medications or were there changes to any of your previous medications? : Yes (blood pressure medication)  Does the patient have all of their medications?: Yes  Do you have questions regarding any of your medications? : No  Do you have all of your needed medical supplies or equipment (DME)?  (i.e. oxygen tank, CPAP, cane, etc.): Yes              [facility] Explained and offered Care Coordination support to eligible patients: N/A    Patient accepted? N/A    CHW closed program due to duplication of care management from [facility] Coordinator- please see CHW assessment from 01/23/25.    Follow up Plan     Discharge Follow-Up  Discharge follow up appointment scheduled in alignment with recommended follow up timeframe or Transitions of Risk Category? (Low = within 30 days; Moderate= within 14 days; High= within 7 days): Yes  Discharge Follow Up Appointment Date: 04/02/25  Discharge Follow Up Appointment Scheduled with?: Primary Care Provider    Future Appointments   Date Time Provider Department Center   4/22/2025  2:30 PM Lorin Baker MD [facility]   5/2/2025  3:00 PM Ronnie Kellogg MD [facility]   8/4/2025 12:00 PM Mai Figueroa PA-C [facility]   10/20/2025  3:00 PM Marshall Medina MD [facility]       Outpatient Plan as outlined on AVS reviewed with patient.      For any urgent concerns, please contact our 24 hour nurse triage line: [phone]       URBANO Barba Community Health Worker  Clinic Care Coordination  [facility]   Clinic #: [phone]  Direct #:  [phone]

## 2025-04-03 NOTE — LETTER
PRIMARY CARE CARE COORDINATION   [facility]  [address], [place] [address]    April 3, 2025    Isabell Matthews  [address]  [place] [address]      Dear Isabell,        I am a community health worker who works with Ronnie Kellogg MD with the Primary Care clinic at the [facility] [place] I wanted to thank you for spending the time to talk with me.  Below is a description of clinic care coordination and how I can further assist you.       The clinic care coordination team is made up of a registered nurse, , and community health worker who understand the health care system. The goal of clinic care coordination is to help you manage your health and improve access to the health care system. Our team works alongside your provider to assist you in determining your health and social needs. We can help you obtain health care and community resources, providing you with necessary information and education. We can work with you through any barriers and develop a care plan that helps coordinate and strengthen the communication between you and your care team. Our services are voluntary and are offered without charge to you personally.    Please feel free to contact me with any questions or concerns regarding care coordination and what we can offer.      We are focused on providing you with the highest-quality healthcare experience possible.    Sincerely,     Florence KOENIG Community Health Worker  Clinic Care Coordination  [facility]   Clinic #: [phone]  Direct #: [phone]

## 2025-04-03 NOTE — TELEPHONE ENCOUNTER
MTM referral from: Transitions of Care (recent hospital discharge, TCU discharge, or ED visit)    MTM referral outreach attempt #2 on April 3, 2025 at 12:25 PM      Outcome: Spoke with patient already talked to primary dr    Use [facility] part d map  for the carrier/Plan on the flowsheet          RAYA Zavala   [phone]

## 2025-04-05 ENCOUNTER — HEALTH MAINTENANCE LETTER (OUTPATIENT)
Age: 65
End: 2025-04-05

## 2025-04-07 ENCOUNTER — TELEPHONE (OUTPATIENT)
Dept: FAMILY MEDICINE | Facility: CLINIC | Age: 65
End: 2025-04-07
Payer: COMMERCIAL

## 2025-04-07 NOTE — TELEPHONE ENCOUNTER
{Screenshot of Product, Insurance, and Cardholder ID)      Please route determinations to the Pharm Diabetes pool ([id number]).      Thank you!    Diabetes Care Services Team   [facility]  [address] [place] [address]

## 2025-04-09 ENCOUNTER — TELEPHONE (OUTPATIENT)
Dept: ORTHOPEDICS | Facility: CLINIC | Age: 65
End: 2025-04-09
Payer: COMMERCIAL

## 2025-04-09 ENCOUNTER — TELEPHONE (OUTPATIENT)
Dept: FAMILY MEDICINE | Facility: CLINIC | Age: 65
End: 2025-04-09
Payer: COMMERCIAL

## 2025-04-09 NOTE — TELEPHONE ENCOUNTER
[facility] Call Center    Phone Message    May a detailed message be left on voicemail: yes     Reason for Call: Fernanda asking For Range of Motion Orders for Left Upper Extremity .. Verbal is Ok . Please call     Action Taken: Message routed to:  [facility] MICHAELA    Travel Screening: Not Applicable     Date of Service:

## 2025-04-09 NOTE — TELEPHONE ENCOUNTER
[facility] Call [facility]    Phone Message    May a detailed message be left on voicemail: yes     Reason for Call: Order(s): Home Care Orders: Physical Therapy (PT): once a week for seven weeks.Please call with verbal orders

## 2025-04-09 NOTE — TELEPHONE ENCOUNTER
PA Initiation    Medication: FREESTYLE MARIA FERNANDA 2 SENSOR [facility]  Insurance Company: [employer] - Phone [phone] Fax [phone]  Pharmacy Filling the Rx: [facility] - [place], [place] - [facility] [address]  Filling Pharmacy Phone: [phone]  Filling Pharmacy Fax: [phone]  Start Date: 4/9/2025

## 2025-04-09 NOTE — TELEPHONE ENCOUNTER
I called and LVM for Fernanda regarding her request for orders for Isabell. I let her know that at this time Dr. Baker would only like gentle ROM of the fingers at time time. Call back information was provided should she have any additional questions or concerns.

## 2025-04-10 NOTE — TELEPHONE ENCOUNTER
Left detailed VM on confidential voicemail box for Anatoliy with [facility] with the following verbal order(s): Home Care Orders: Physical Therapy (PT): once a week for seven weeks.  Andie AGUILAR LPN  [facility]

## 2025-04-15 ENCOUNTER — DOCUMENTATION ONLY (OUTPATIENT)
Dept: FAMILY MEDICINE | Facility: CLINIC | Age: 65
End: 2025-04-15

## 2025-04-15 NOTE — PROGRESS NOTES
Chief Complaint:   Chief Complaint   Patient presents with    RECHECK     Follow-up   Fracture of left distal radius. DOI: 3/23/25           Referring Physician: No ref. provider found    Date of Injury: 3/23/2025  Mechanism of Injury: fall  Diagnosis: left distal radius fracture with dorsal angulation  Treatment: splint    History of Present Illness: Isabell Matthews is a 64 year old female presenting for evaluation of left wrist pain and fracture. Just got home from the hospital 2 days ago. Pain is controlled in the brace. The patient has a history of stroke and vascular dementia, and is currently having difficulty with blood pressure management. No finger numbness    4/22/2025: Returns for reevaluation, now 4 weeks s/p injury. Has been wearing exos brace consistently. Using tylenol, voltaren, ice for pain. Denies numbness/tingling in fingers. Brace fitting well.     Clinical documentation by Dr. Granados on 3/30/2025 was reviewed.    Past Medical History:   Past Medical History:   Diagnosis Date    Chronic pain     Coronary atherosclerosis 06/14/2016    Essential hypertension     Ex-cigarette smoker     quit 7/2018 over 35 years    Ex-cigarette smoker     Hyperlipidemia     Hypothyroid     Seizures (H)     Stroke (H)     Vascular dementia (H)      Patient Active Problem List   Diagnosis    Acquired hypothyroidism    Seizures (H)    Hyperlipidemia LDL goal <100    DKA (diabetic ketoacidoses)    Nephrolithiasis    Left renal mass    Coronary atherosclerosis    Dehydration    Ischemic vascular dementia (H)    Other osteoarthritis of spine, unspecified spinal region    Vitamin D deficiency    Vitamin B12 deficiency (non anemic)    Seizures (H)    Nausea & vomiting    Nail deformity    Mixed stress and urge urinary incontinence    Luetscher's syndrome    Left-sided nontraumatic intracerebral hemorrhage, unspecified cerebral location (H)    Vascular dementia without behavioral disturbance (H)    Fungal dermatitis    Type 1  diabetes mellitus with diabetic polyneuropathy (H)    Chest pressure    Essential hypertension    Dysphagia    Tobacco use    Dysthymia    History of diabetes with ketoacidosis    Stage 3b chronic kidney disease (H)    Full incontinence of feces    Acute CVA (cerebrovascular accident) (H)    Altered mental status, unspecified altered mental status type    Hyperglycemia    Infection due to 2019 novel coronavirus    Hx: UTI (urinary tract infection)    Fibromyalgia    E. 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)    Acute cystitis without hematuria    Cerebrovascular accident (CVA), unspecified mechanism (H)    Severe dementia without behavioral disturbance, psychotic disturbance, mood disturbance, or anxiety, unspecified dementia type (H)    Diabetic ketoacidosis without coma associated with type 1 diabetes mellitus (H)    Cognitive decline    Nausea and vomiting, unspecified vomiting type    Closed fracture of multiple ribs of right side, initial encounter    TIA (transient ischemic attack)    Ischemic stroke (H)    Lethargy    History of CVA (cerebrovascular accident)    H/O insulin dependent diabetes mellitus    Urinary tract infection in female    Chronic kidney disease, unspecified CKD stage    Closed fracture of one rib of left side, initial encounter    COVID-19    Elevated troponin    Wrist fracture, left, closed, initial encounter           Physical Examination:  There were no vitals filed for this visit.  There is no height or weight on file to calculate BMI.    Well appearing, well nourished  Alert and oriented x 3, cooperative with exam     Left wrist  Skin intact  Improved swelling over distal radius, decreased bruising  Able to reach palm with all fingertips in fist  Motor Exam: Intact TU/OK/x2-3  Sensory Exam: Sensation intact to light touch in  FDWS (radial), volar IF (median), volar SF (ulnar)  Vascular Exam: fingers warm, well perfused    Imaging/Studies:  XR (3 views) of the left wrist was obtained 4/22/2025.  I reviewed the images with the patient.  The imaging study shows dorsally angulated intraarticular distal radius fracture (approx 34 degrees), ulnar styloid avulsion, vascular calcifications, CMC and STT degenerative changes. Slight settling of fracture.     XR (3 views) of the left wrist was obtained 4/1/2025.  I reviewed the images with the patient.  The imaging study shows dorsally angulated intraarticular distal radius fracture (approx 30 degrees), ulnar styloid avulsion, vascular calcifications, CMC and STT degenerative changes. Similar to prior    XR (3 views) of the left wrist was obtained 3/23/2025.  I reviewed the images with the patient.  The imaging study shows dorsally angulated intraarticular distal radius fracture, ulnar styloid avulsion, vascular calcifications, CMC and STT degenerative changes.    Assessment: Isabell Matthews is a 64 year old female with left intraarticular distal radius fracture.    Plan:   I had a detailed discussion with the patient regarding my clinical findings, diagnosis, and treatment plan. I again reviewed the treatment options for her distal radius fracture (immobilization, CRPP, ORIF, etc.) as well as the risks and benefits of each.  Due to the fracture displacement operative treatment would improve alignment and stabilize the fracture. However the patient has significant medical co-morbidities and has low functional demand.  After a thorough discussion the patient/daughter have elected to continue non-operative treatment. All questions answered.      NWB left upper extremity.   Short arm exos brace maintained   Work on finger motion     Next Visit:   Follow-up: 3 weeks   Imaging: XR left wrist .   Plan: review imaging and symptoms. Consider advancing activities pending evidence of healing    Abbie BISHOP  MD Sebastien  Hand Surgery Fellow    I have seen and evaluated this patient myself and agree with the documentation.  ALVARADO POLANCO MD

## 2025-04-15 NOTE — TELEPHONE ENCOUNTER
Prior Authorization Approval    Medication: [facility]DA [facility]  Authorization Effective Date: 4/9/2025  Authorization Expiration Date: 4/9/2028  Approved Dose/Quantity: as written  Reference #: [id number]   Insurance Company: [employer] - Phone [phone] Fax [phone]  Which Pharmacy is filling the prescription: [facility] - [facility] [address]  Pharmacy Notified: y  Patient Notified: Instructed pharmacy to notify patient once order is ready.

## 2025-04-15 NOTE — PROGRESS NOTES
Type of Form Received: [facility] Home Med Equipment Incontinence Supplies     Form Received (Date) 4/15/25   Form Filled out No   Placed in provider folder Yes

## 2025-04-16 ENCOUNTER — TELEPHONE (OUTPATIENT)
Dept: FAMILY MEDICINE | Facility: CLINIC | Age: 65
End: 2025-04-16
Payer: COMMERCIAL

## 2025-04-16 NOTE — TELEPHONE ENCOUNTER
[facility] Call Center    Phone Message    May a detailed message be left on voicemail: no     Reason for Call: Other: Dasha from [facility] is requesting to discontinue the social work order. She states pt is already open with a care coordinator through [facility], so this request would be considered duplicate to Medicare. Please review and call Dasha to discuss at [phone].       Action Taken: Other: PCC    Travel Screening: Not Applicable     Date of Service:

## 2025-04-17 NOTE — TELEPHONE ENCOUNTER
Left detailed VM on confidential voicemail box for Dasha ECHOLS with the following verbal orders: discontinue the social work order.  Andie AGUILAR LPN  [facility]

## 2025-04-21 ENCOUNTER — MEDICAL CORRESPONDENCE (OUTPATIENT)
Dept: HEALTH INFORMATION MANAGEMENT | Facility: CLINIC | Age: 65
End: 2025-04-21
Payer: COMMERCIAL

## 2025-04-22 ENCOUNTER — ANCILLARY PROCEDURE (OUTPATIENT)
Dept: GENERAL RADIOLOGY | Facility: CLINIC | Age: 65
End: 2025-04-22
Attending: STUDENT IN AN ORGANIZED HEALTH CARE EDUCATION/TRAINING PROGRAM
Payer: COMMERCIAL

## 2025-04-22 ENCOUNTER — OFFICE VISIT (OUTPATIENT)
Dept: ORTHOPEDICS | Facility: CLINIC | Age: 65
End: 2025-04-22
Payer: COMMERCIAL

## 2025-04-22 DIAGNOSIS — M25.532 WRIST PAIN, LEFT: ICD-10-CM

## 2025-04-22 DIAGNOSIS — S52.532D CLOSED COLLES' FRACTURE OF LEFT RADIUS WITH ROUTINE HEALING, SUBSEQUENT ENCOUNTER: Primary | ICD-10-CM

## 2025-04-22 PROCEDURE — 1125F AMNT PAIN NOTED PAIN PRSNT: CPT | Performed by: STUDENT IN AN ORGANIZED HEALTH CARE EDUCATION/TRAINING PROGRAM

## 2025-04-22 PROCEDURE — 99213 OFFICE O/P EST LOW 20 MIN: CPT | Mod: 24 | Performed by: STUDENT IN AN ORGANIZED HEALTH CARE EDUCATION/TRAINING PROGRAM

## 2025-04-22 PROCEDURE — 73110 X-RAY EXAM OF WRIST: CPT | Mod: LT | Performed by: RADIOLOGY

## 2025-04-22 NOTE — NURSING NOTE
Reason For Visit:   Chief Complaint   Patient presents with    RECHECK     Follow-up   Fracture of left distal radius. DOI: 3/23/25           Primary MD: Ronnie Kellogg  Ref. MD: Jean-Paul    Age: 64 year old    ?  No      There were no vitals taken for this visit.      Pain Assessment  Patient Currently in Pain: Yes  0-10 Pain Scale: 4  Primary Pain Location: Wrist (Left)  Pain Descriptors: Aching, Sore, Sharp, Intermittent    Hand Dominance Evaluation  Hand Dominance: Right          QuickDASH Assessment      4/22/2025     2:27 PM   QuickDASH Main   1. Open a tight or new jar Unable   2. Do heavy household chores (e.g., wash walls, floors) Unable   3. Carry a shopping bag or briefcase Mild difficulty   4. Wash your back Unable   5. Use a knife to cut food Mild difficulty   6. Recreational activities in which you take some force or impact through your arm, shoulder or hand (e.g., golf, hammering, tennis, etc.) Unable to answer   7. During the past week, to what extent has your arm, shoulder or hand problem interfered with your normal social activities with family, friends, neighbours or groups Moderately   8. During the past week, were you limited in your work or other regular daily activities as a result of your arm, shoulder or hand problem Unable to answer   9. Arm, shoulder or hand pain Moderate   10.Tingling (pins and needles) in your arm,shoulder or hand Moderate   11. During the past week, how much difficulty have you had sleeping because of the pain in your arm, shoulder or hand Mild difficulty   Quickdash Ability Score 43.18          Current Outpatient Medications   Medication Sig Dispense Refill    acetaminophen (TYLENOL) 325 MG tablet Take 3 tablets (975 mg) by mouth every 8 hours.      aspirin (ASA) 81 MG chewable tablet Take 1 tablet (81 mg) by mouth daily 90 tablet 3    atorvastatin (LIPITOR) 40 MG tablet Take 1 tablet (40 mg) by mouth daily. 90 tablet 3    blood glucose (NO BRAND SPECIFIED)  test strip Use to test blood sugar 4 times daily or as directed. 360 strip 3    blood glucose monitoring (ONE TOUCH ULTRA MINI) meter device kit Use to test blood sugar 4 times daily or as directed. 1 kit 0    chlorhexidine (PERIDEX) 0.12 % solution Take 30 mLs by mouth daily. Swish and spit 30 mL once daily for 14 days.Swish and spit 30 mL once daily for 14 days. 473 mL 3    Continuous Glucose Sensor (FREESTYLE MARIA FERNANDA 2 PLUS SENSOR) MISC Change every 15 days. 6 each 3    Continuous Glucose Sensor (FREESTYLE MARIA FERNANDA 2 SENSOR) MISC Change every 14 days. 6 each 3    cyanocobalamin (VITAMIN B-12) 1000 MCG tablet Take 1 tablet (1,000 mcg) by mouth daily 100 tablet 3    diclofenac (VOLTAREN) 1 % topical gel Apply 2 g topically 4 times daily. 150 g 2    DULoxetine (CYMBALTA) 20 MG capsule TAKE 1 CAPSULE(20 MG) BY MOUTH DAILY 90 capsule 3    gabapentin (NEURONTIN) 100 MG capsule Take 1 capsule (100 mg) by mouth at bedtime. 90 capsule 3    Glucagon (GVOKE HYPOPEN) 1 MG/0.2ML pen Inject the contents of 1 device under the skin into lower abdomen, outer thigh, or outer upper arm as needed for hypoglycemia. If no response after 15 minutes, additional 1 mg dose from a new device may be injected while waiting for emergency assistance. 2 each 1    hydrALAZINE (APRESOLINE) 10 MG tablet Take 1 tablet (10 mg) by mouth 2 times daily. 180 tablet 0    hydrALAZINE (APRESOLINE) 25 MG tablet Take 1 tablet (25 mg) by mouth at bedtime. 90 tablet 0    insulin aspart (NOVOLOG FLEXPEN) 100 UNIT/ML pen Novolog Flexpen: 1 unit per 15 grams of carbohydrates with meals 15 mL 2    insulin aspart (NOVOLOG FLEXPEN) 100 UNIT/ML pen Inject 2 units with meals plus correction scale additional three times daily before meals  as follows  Pre-Meal  - 200 give additional 1 unit.  For Pre-Meal  -250 give 2 additional units.  For Pre-Meal -300 give 3 additional units.  For Pre-Meal -350 give 4 additional units.  For Pre-Meal -400 give  5 additional units Max 25 units daily (Patient taking differently: Inject 2 units with meals plus correction scale additional three times daily before meals  as follows  Pre-Meal  - 200 give additional 1 unit.  For Pre-Meal  -250 give 2 additional units.  For Pre-Meal -300 give 3 additional units.  For Pre-Meal -350 give 4 additional units.  For Pre-Meal -400 give 5 additional units Max 20 units daily.) 30 mL 4    insulin glargine (LANTUS PEN) 100 UNIT/ML pen Inject 18 Units subcutaneously at bedtime. 15 mL 1    levETIRAcetam (KEPPRA) 250 MG tablet Take 1 tablet (250 mg) by mouth every morning. (in addition to the separate prescription for 500mg, to be taken at night) 60 tablet 0    levETIRAcetam (KEPPRA) 500 MG tablet Take 1 tablet (500 mg) by mouth every evening. (in addition to the separate prescription for 250 mg taken in the morning) 60 tablet 1    levothyroxine (SYNTHROID/LEVOTHROID) 100 MCG tablet Take 1 tablet (100 mcg) by mouth every morning (before breakfast). RAMÍREZ - brand medically necessary because of reaction to excipient in generic 60 tablet 1    levothyroxine (SYNTHROID/LEVOTHROID) 100 MCG tablet Take 1 tablet (100 mcg) by mouth every morning (before breakfast). 60 tablet 1    Lidocaine (LIDOCARE) 4 % Patch Place 1 patch over 12 hours onto the skin every 24 hours. To prevent lidocaine toxicity, patient should be patch free for 12 hrs daily. 20 patch 0    loratadine (CLARITIN) 10 MG tablet Take 1 tablet (10 mg) by mouth daily. 90 tablet 3    methocarbamol (ROBAXIN) 500 MG tablet Take 1 tablet (500 mg) by mouth daily as needed for muscle spasms. 30 tablet 0    PENTIPS 32G X 4 MM miscellaneous Inject 1 each subcutaneously daily. Use 4 pen needles daily or as directed.      zinc oxide (DESITIN) 20 % external ointment Apply topically as needed for dry skin or irritation 85 g 3       Allergies   Allergen Reactions    Seasonal Allergies        Beverly Mathew, ATC

## 2025-04-22 NOTE — LETTER
4/22/2025      Isabell Matthews  [address]  [place] [address]      Dear Colleague,    Thank you for referring your patient, Isabell Matthews, to the [facility] [place]. Please see a copy of my visit note below.    Chief Complaint:   Chief Complaint   Patient presents with     RECHECK     Follow-up   Fracture of left distal radius. DOI: 3/23/25           Referring Physician: No ref. provider found    Date of Injury: 3/23/2025  Mechanism of Injury: fall  Diagnosis: left distal radius fracture with dorsal angulation  Treatment: splint    History of Present Illness: Isabell Matthews is a 64 year old female presenting for evaluation of left wrist pain and fracture. Just got home from the hospital 2 days ago. Pain is controlled in the brace. The patient has a history of stroke and vascular dementia, and is currently having difficulty with blood pressure management. No finger numbness    4/22/2025: Returns for reevaluation, now 4 weeks s/p injury. Has been wearing exos brace consistently. Using tylenol, voltaren, ice for pain. Denies numbness/tingling in fingers. Brace fitting well.     Clinical documentation by Dr. Granados on 3/30/2025 was reviewed.    Past Medical History:   Past Medical History:   Diagnosis Date     Chronic pain      Coronary atherosclerosis 06/14/2016     Essential hypertension      Ex-cigarette smoker     quit 7/2018 over 35 years     Ex-cigarette smoker      Hyperlipidemia      Hypothyroid      Seizures (H)      Stroke (H)      Vascular dementia (H)      Patient Active Problem List   Diagnosis     Acquired hypothyroidism     Seizures (H)     Hyperlipidemia LDL goal <100     DKA (diabetic ketoacidoses)     Nephrolithiasis     Left renal mass     Coronary atherosclerosis     Dehydration     Ischemic vascular dementia (H)     Other osteoarthritis of spine, unspecified spinal region     Vitamin D deficiency     Vitamin B12 deficiency (non anemic)     Seizures (H)     Nausea &  vomiting     Nail deformity     Mixed stress and urge urinary incontinence     Luetscher's syndrome     Left-sided nontraumatic intracerebral hemorrhage, unspecified cerebral location (H)     Vascular dementia without behavioral disturbance (H)     Fungal dermatitis     Type 1 diabetes mellitus with diabetic polyneuropathy (H)     Chest pressure     Essential hypertension     Dysphagia     Tobacco use     Dysthymia     History of diabetes with ketoacidosis     Stage 3b chronic kidney disease (H)     Full incontinence of feces     Acute CVA (cerebrovascular accident) (H)     Altered mental status, unspecified altered mental status type     Hyperglycemia     Infection due to 2019 novel coronavirus     Hx: UTI (urinary tract infection)     Fibromyalgia     E. coli UTI     Intractable epilepsy with status epilepticus, unspecified epilepsy type (H)     Vascular dementia with depressed mood (H)     Type 1 diabetes mellitus with other circulatory complication (H)     Acute cystitis without hematuria     Cerebrovascular accident (CVA), unspecified mechanism (H)     Severe dementia without behavioral disturbance, psychotic disturbance, mood disturbance, or anxiety, unspecified dementia type (H)     Diabetic ketoacidosis without coma associated with type 1 diabetes mellitus (H)     Cognitive decline     Nausea and vomiting, unspecified vomiting type     Closed fracture of multiple ribs of right side, initial encounter     TIA (transient ischemic attack)     Ischemic stroke (H)     Lethargy     History of CVA (cerebrovascular accident)     H/O insulin dependent diabetes mellitus     Urinary tract infection in female     Chronic kidney disease, unspecified CKD stage     Closed fracture of one rib of left side, initial encounter     COVID-19     Elevated troponin     Wrist fracture, left, closed, initial encounter           Physical Examination:  There were no vitals filed for this visit.  There is no height or weight on file to  calculate BMI.    Well appearing, well nourished  Alert and oriented x 3, cooperative with exam     Left wrist  Skin intact  Improved swelling over distal radius, decreased bruising  Able to reach palm with all fingertips in fist  Motor Exam: Intact TU/OK/x2-3  Sensory Exam: Sensation intact to light touch in FDWS (radial), volar IF (median), volar SF (ulnar)  Vascular Exam: fingers warm, well perfused    Imaging/Studies:  XR (3 views) of the left wrist was obtained 4/22/2025.  I reviewed the images with the patient.  The imaging study shows dorsally angulated intraarticular distal radius fracture (approx 34 degrees), ulnar styloid avulsion, vascular calcifications, CMC and STT degenerative changes. Slight settling of fracture.     XR (3 views) of the left wrist was obtained 4/1/2025.  I reviewed the images with the patient.  The imaging study shows dorsally angulated intraarticular distal radius fracture (approx 30 degrees), ulnar styloid avulsion, vascular calcifications, CMC and STT degenerative changes. Similar to prior    XR (3 views) of the left wrist was obtained 3/23/2025.  I reviewed the images with the patient.  The imaging study shows dorsally angulated intraarticular distal radius fracture, ulnar styloid avulsion, vascular calcifications, CMC and STT degenerative changes.    Assessment: Isabell Matthews is a 64 year old female with left intraarticular distal radius fracture.    Plan:   I had a detailed discussion with the patient regarding my clinical findings, diagnosis, and treatment plan. I again reviewed the treatment options for her distal radius fracture (immobilization, CRPP, ORIF, etc.) as well as the risks and benefits of each.  Due to the fracture displacement operative treatment would improve alignment and stabilize the fracture. However the patient has significant medical co-morbidities and has low functional demand.  After a thorough discussion the patient/daughter have elected to continue  non-operative treatment. All questions answered.       NWB left upper extremity.    Short arm exos brace maintained    Work on finger motion     Next Visit:    Follow-up: 3 weeks    Imaging: XR left wrist .    Plan: review imaging and symptoms. Consider advancing activities pending evidence of healing    Abbie Crowe MD  Hand Surgery Fellow    I have seen and evaluated this patient myself and agree with the documentation.  ALVARADO POLANCO MD      Again, thank you for allowing me to participate in the care of your patient.        Sincerely,        ALVARADO POLANCO MD    Electronically signed

## 2025-04-24 ENCOUNTER — OFFICE VISIT (OUTPATIENT)
Dept: FAMILY MEDICINE | Facility: CLINIC | Age: 65
End: 2025-04-24
Payer: COMMERCIAL

## 2025-04-24 ENCOUNTER — LAB (OUTPATIENT)
Dept: LAB | Facility: CLINIC | Age: 65
End: 2025-04-24
Payer: COMMERCIAL

## 2025-04-24 VITALS
RESPIRATION RATE: 16 BRPM | OXYGEN SATURATION: 98 % | HEIGHT: 63 IN | BODY MASS INDEX: 24.38 KG/M2 | DIASTOLIC BLOOD PRESSURE: 83 MMHG | HEART RATE: 95 BPM | SYSTOLIC BLOOD PRESSURE: 151 MMHG

## 2025-04-24 DIAGNOSIS — I10 BENIGN ESSENTIAL HYPERTENSION: ICD-10-CM

## 2025-04-24 DIAGNOSIS — Z23 NEED FOR VACCINATION: ICD-10-CM

## 2025-04-24 DIAGNOSIS — S22.41XA CLOSED FRACTURE OF MULTIPLE RIBS OF RIGHT SIDE, INITIAL ENCOUNTER: ICD-10-CM

## 2025-04-24 DIAGNOSIS — I10 ESSENTIAL HYPERTENSION: ICD-10-CM

## 2025-04-24 DIAGNOSIS — E03.9 ACQUIRED HYPOTHYROIDISM: ICD-10-CM

## 2025-04-24 DIAGNOSIS — N39.0 RECURRENT URINARY TRACT INFECTION: ICD-10-CM

## 2025-04-24 DIAGNOSIS — R42 VERTIGO: Primary | ICD-10-CM

## 2025-04-24 LAB
ALBUMIN SERPL BCG-MCNC: 3.9 G/DL (ref 3.5–5.2)
ALP SERPL-CCNC: 182 U/L (ref 40–150)
ALT SERPL W P-5'-P-CCNC: 40 U/L (ref 0–50)
ANION GAP SERPL CALCULATED.3IONS-SCNC: 7 MMOL/L (ref 7–15)
AST SERPL W P-5'-P-CCNC: 41 U/L (ref 0–45)
BASOPHILS # BLD AUTO: 0 10E3/UL (ref 0–0.2)
BASOPHILS NFR BLD AUTO: 1 %
BILIRUB SERPL-MCNC: 0.2 MG/DL
BUN SERPL-MCNC: 34 MG/DL (ref 8–23)
CALCIUM SERPL-MCNC: 9.2 MG/DL (ref 8.8–10.4)
CHLORIDE SERPL-SCNC: 106 MMOL/L (ref 98–107)
CREAT SERPL-MCNC: 1.66 MG/DL (ref 0.51–0.95)
EGFRCR SERPLBLD CKD-EPI 2021: 34 ML/MIN/1.73M2
EOSINOPHIL # BLD AUTO: 0.1 10E3/UL (ref 0–0.7)
EOSINOPHIL NFR BLD AUTO: 2 %
ERYTHROCYTE [DISTWIDTH] IN BLOOD BY AUTOMATED COUNT: 15 % (ref 10–15)
GLUCOSE SERPL-MCNC: 179 MG/DL (ref 70–99)
HCO3 SERPL-SCNC: 26 MMOL/L (ref 22–29)
HCT VFR BLD AUTO: 34.6 % (ref 35–47)
HGB BLD-MCNC: 10.4 G/DL (ref 11.7–15.7)
IMM GRANULOCYTES # BLD: 0 10E3/UL
IMM GRANULOCYTES NFR BLD: 0 %
LYMPHOCYTES # BLD AUTO: 1.2 10E3/UL (ref 0.8–5.3)
LYMPHOCYTES NFR BLD AUTO: 19 %
MCH RBC QN AUTO: 26.7 PG (ref 26.5–33)
MCHC RBC AUTO-ENTMCNC: 30.1 G/DL (ref 31.5–36.5)
MCV RBC AUTO: 89 FL (ref 78–100)
MONOCYTES # BLD AUTO: 0.4 10E3/UL (ref 0–1.3)
MONOCYTES NFR BLD AUTO: 6 %
NEUTROPHILS # BLD AUTO: 4.4 10E3/UL (ref 1.6–8.3)
NEUTROPHILS NFR BLD AUTO: 73 %
NRBC # BLD AUTO: 0 10E3/UL
NRBC BLD AUTO-RTO: 0 /100
PLATELET # BLD AUTO: 251 10E3/UL (ref 150–450)
POTASSIUM SERPL-SCNC: 4.7 MMOL/L (ref 3.4–5.3)
PROT SERPL-MCNC: 6.7 G/DL (ref 6.4–8.3)
RBC # BLD AUTO: 3.9 10E6/UL (ref 3.8–5.2)
SODIUM SERPL-SCNC: 139 MMOL/L (ref 135–145)
TSH SERPL DL<=0.005 MIU/L-ACNC: 0.99 UIU/ML (ref 0.3–4.2)
WBC # BLD AUTO: 6.1 10E3/UL (ref 4–11)

## 2025-04-24 RX ORDER — MECLIZINE HCL 12.5 MG 12.5 MG/1
12.5 TABLET ORAL 3 TIMES DAILY PRN
Qty: 60 TABLET | Refills: 3 | Status: SHIPPED | OUTPATIENT
Start: 2025-04-24

## 2025-04-24 RX ORDER — CARVEDILOL 6.25 MG/1
6.25 TABLET ORAL 2 TIMES DAILY WITH MEALS
Qty: 60 TABLET | Refills: 3 | Status: SHIPPED | OUTPATIENT
Start: 2025-04-24

## 2025-04-24 RX ORDER — LEVOTHYROXINE SODIUM 88 MCG
88 TABLET ORAL
Qty: 90 TABLET | Refills: 3 | Status: SHIPPED | OUTPATIENT
Start: 2025-04-24

## 2025-04-24 RX ORDER — HYDRALAZINE HYDROCHLORIDE 10 MG/1
TABLET, FILM COATED ORAL
Qty: 180 TABLET | Refills: 0 | Status: SHIPPED | OUTPATIENT
Start: 2025-04-24

## 2025-04-24 NOTE — PROGRESS NOTES
Assessment & Plan     Vertigo    - Physical Therapy  Referral; Future  - meclizine (ANTIVERT) 12.5 MG tablet; Take 1 tablet (12.5 mg) by mouth 3 times daily as needed for dizziness.  - Adult ENT  Referral; Future    Benign essential hypertension    - carvedilol (COREG) 6.25 MG tablet; Take 1 tablet (6.25 mg) by mouth 2 times daily (with meals).    Essential hypertension    - hydrALAZINE (APRESOLINE) 10 MG tablet; Take one po bid prn if systolic blood pressure over 160  - CBC with platelets and differential; Future  - Comprehensive metabolic panel (BMP + Alb, Alk Phos, ALT, AST, Total. Bili, TP); Future    Closed fracture of multiple ribs of right side, initial encounter    - hydrALAZINE (APRESOLINE) 10 MG tablet; Take one po bid prn if systolic blood pressure over 160    Acquired hypothyroidism    - TSH with free T4 reflex; Future  - SYNTHROID 88 MCG tablet; Take 1 tablet (88 mcg) by mouth every morning (before breakfast).    Recurrent urinary tract infection  No sx of now  - UA Macroscopic with reflex to Microscopic and Culture - Lab Collect; Standing    The longitudinal plan of care for the diagnosis(es)/condition(s) as documented were addressed during this visit. Due to the added complexity in care, I will continue to support Isabell in the subsequent management and with ongoing continuity of care.  45 minutes spent by me on the date of the encounter doing chart review, history and exam, documentation and further activities per the note    MED REC REQUIRED{  Post Medication Reconciliation Status:         Subjective   Isabell is a 64 year old, presenting for the following health issues:  Follow Up (Follow up on hypertension), Hospital F/U (Pt was in ED 3/23 - 3/25 for dehydration, orthostatic hypotension, and left broken wrist. Pt reports ongoing vertigo that is constantly present, more severe recently. Increased instability when walking.), Referral (Pt requesting referral for vertigo), and Recheck  Medication (Concerns about new BP medication, daughter states it has kept her BP too high. Refill for brand name only Synthroid. )        4/24/2025    11:21 AM   Additional Questions   Roomed by sim   Accompanied by daughter     HPI    Here w/ dtr  No new problems since Ortho visit two days ago  Inpt stays and provider notes since last saw me reviewed  Dizzy; much of time. Lengthy talk about various types of dizziiness and causes.   She will see if PT can do treatments for possible bpv as at least part of it, and try low dose meclizine watching for possibe se  She and dtr think hers is vertiginous dizziness, we have very detailed long conversation to make sure   BP: high. Long discussion about meds. They find more than bid dosing of anything a challenge. Will revert to coreg but just low dose, with prn hydralazine (I adjusted the rx in computer for the ten mg as a prn). They'll mychart home bp updates.  Left wrist fx: has ortho, pain improving  Reminds us thyroid needs name brand for her; today lab wnl, has been on home made combo dose of 75 mcg plus 1/4 of a 75 mcg; closest rx 88 I tell them will send in  Due for covid shot  Past Medical History:   Diagnosis Date    Chronic pain     Coronary atherosclerosis 06/14/2016    Essential hypertension     Ex-cigarette smoker     quit 7/2018 over 35 years    Ex-cigarette smoker     Hyperlipidemia     Hypothyroid     Seizures (H)     Stroke (H)     Vascular dementia (H)      Past Surgical History:   Procedure Laterality Date    athroplasty hip Bilateral     2003, 2006    CATARACT IOL, RT/LT      OTHER SURGICAL HISTORY      athroplasty hip    PICC DOUBLE LUMEN PLACEMENT Right 06/11/2023    right basilic 5 fr dl power picc 39 cm    STENT       Current Outpatient Medications   Medication Sig Dispense Refill    acetaminophen (TYLENOL) 325 MG tablet Take 3 tablets (975 mg) by mouth every 8 hours.      aspirin (ASA) 81 MG chewable tablet Take 1 tablet (81 mg) by mouth daily 90  tablet 3    atorvastatin (LIPITOR) 40 MG tablet Take 1 tablet (40 mg) by mouth daily. 90 tablet 3    blood glucose (NO BRAND SPECIFIED) test strip Use to test blood sugar 4 times daily or as directed. 360 strip 3    blood glucose monitoring (ONE TOUCH ULTRA MINI) meter device kit Use to test blood sugar 4 times daily or as directed. 1 kit 0    carvedilol (COREG) 6.25 MG tablet Take 1 tablet (6.25 mg) by mouth 2 times daily (with meals). 60 tablet 3    chlorhexidine (PERIDEX) 0.12 % solution Take 30 mLs by mouth daily. Swish and spit 30 mL once daily for 14 days.Swish and spit 30 mL once daily for 14 days. 473 mL 3    Continuous Glucose Sensor (FREESTYLE MARIA FERNANDA 2 PLUS SENSOR) MISC Change every 15 days. 6 each 3    Continuous Glucose Sensor (FREESTYLE MARIA FERNANDA 2 SENSOR) MISC Change every 14 days. 6 each 3    cyanocobalamin (VITAMIN B-12) 1000 MCG tablet Take 1 tablet (1,000 mcg) by mouth daily 100 tablet 3    diclofenac (VOLTAREN) 1 % topical gel Apply 2 g topically 4 times daily. 150 g 2    DULoxetine (CYMBALTA) 20 MG capsule TAKE 1 CAPSULE(20 MG) BY MOUTH DAILY 90 capsule 3    gabapentin (NEURONTIN) 100 MG capsule Take 1 capsule (100 mg) by mouth at bedtime. 90 capsule 3    Glucagon (GVOKE HYPOPEN) 1 MG/0.2ML pen Inject the contents of 1 device under the skin into lower abdomen, outer thigh, or outer upper arm as needed for hypoglycemia. If no response after 15 minutes, additional 1 mg dose from a new device may be injected while waiting for emergency assistance. 2 each 1    hydrALAZINE (APRESOLINE) 10 MG tablet Take one po bid prn if systolic blood pressure over 160 180 tablet 0    insulin aspart (NOVOLOG FLEXPEN) 100 UNIT/ML pen Novolog Flexpen: 1 unit per 15 grams of carbohydrates with meals 15 mL 2    insulin aspart (NOVOLOG FLEXPEN) 100 UNIT/ML pen Inject 2 units with meals plus correction scale additional three times daily before meals  as follows  Pre-Meal  - 200 give additional 1 unit.  For Pre-Meal   -250 give 2 additional units.  For Pre-Meal -300 give 3 additional units.  For Pre-Meal -350 give 4 additional units.  For Pre-Meal -400 give 5 additional units Max 25 units daily (Patient taking differently: Inject 2 units with meals plus correction scale additional three times daily before meals  as follows  Pre-Meal  - 200 give additional 1 unit.  For Pre-Meal  -250 give 2 additional units.  For Pre-Meal -300 give 3 additional units.  For Pre-Meal -350 give 4 additional units.  For Pre-Meal -400 give 5 additional units Max 20 units daily.) 30 mL 4    insulin glargine (LANTUS PEN) 100 UNIT/ML pen Inject 18 Units subcutaneously at bedtime. 15 mL 1    levETIRAcetam (KEPPRA) 250 MG tablet Take 1 tablet (250 mg) by mouth every morning. (in addition to the separate prescription for 500mg, to be taken at night) 60 tablet 0    levETIRAcetam (KEPPRA) 500 MG tablet Take 1 tablet (500 mg) by mouth every evening. (in addition to the separate prescription for 250 mg taken in the morning) 60 tablet 1    levothyroxine (SYNTHROID/LEVOTHROID) 100 MCG tablet Take 1 tablet (100 mcg) by mouth every morning (before breakfast). RAMÍREZ - brand medically necessary because of reaction to excipient in generic 60 tablet 1    Lidocaine (LIDOCARE) 4 % Patch Place 1 patch over 12 hours onto the skin every 24 hours. To prevent lidocaine toxicity, patient should be patch free for 12 hrs daily. 20 patch 0    loratadine (CLARITIN) 10 MG tablet Take 1 tablet (10 mg) by mouth daily. 90 tablet 3    meclizine (ANTIVERT) 12.5 MG tablet Take 1 tablet (12.5 mg) by mouth 3 times daily as needed for dizziness. 60 tablet 3    methocarbamol (ROBAXIN) 500 MG tablet Take 1 tablet (500 mg) by mouth daily as needed for muscle spasms. 30 tablet 0    PENTIPS 32G X 4 MM miscellaneous Inject 1 each subcutaneously daily. Use 4 pen needles daily or as directed.      SYNTHROID 88 MCG tablet Take 1 tablet (88 mcg) by mouth  every morning (before breakfast). 90 tablet 3    zinc oxide (DESITIN) 20 % external ointment Apply topically as needed for dry skin or irritation 85 g 3    levothyroxine (SYNTHROID/LEVOTHROID) 100 MCG tablet Take 1 tablet (100 mcg) by mouth every morning (before breakfast). 60 tablet 1     No current facility-administered medications for this visit.     Allergies   Allergen Reactions    Seasonal Allergies      Family History   Problem Relation Age of Onset    Acute Myocardial Infarction Father     Heart Disease Maternal Grandmother     Dementia Maternal Grandmother     Myocardial Infarction Father     Dementia Paternal Grandmother     Heart Disease Paternal Grandmother      Social History     Socioeconomic History    Marital status:      Spouse name: Not on file    Number of children: Not on file    Years of education: Not on file    Highest education level: Not on file   Occupational History    Not on file   Tobacco Use    Smoking status: Former     Current packs/day: 0.00     Average packs/day: 0.5 packs/day for 35.0 years (17.5 ttl pk-yrs)     Types: Cigarettes     Start date: 1983     Quit date: 2018     Years since quittin.8    Smokeless tobacco: Never   Substance and Sexual Activity    Alcohol use: Not Currently    Drug use: Not Currently    Sexual activity: Not Currently   Other Topics Concern    Parent/sibling w/ CABG, MI or angioplasty before 65F 55M? Not Asked   Social History Narrative    ** Merged History Encounter **         Recently moved to [facility] with Daughter Charli who is her pca     Social Drivers of Health     Financial Resource Strain: Unknown (3/25/2025)    Financial Resource Strain     Within the past 12 months, have you or your family members you live with been unable to get utilities (heat, electricity) when it was really needed?: Patient unable to answer   Food Insecurity: Unknown (3/25/2025)    Food Insecurity     Within the past 12 months, did you worry that your  "food would run out before you got money to buy more?: Patient unable to answer     Within the past 12 months, did the food you bought just not last and you didn t have money to get more?: Patient unable to answer   Transportation Needs: Unknown (3/25/2025)    Transportation Needs     Within the past 12 months, has lack of transportation kept you from medical appointments, getting your medicines, non-medical meetings or appointments, work, or from getting things that you need?: Patient unable to answer   Physical Activity: Not on file   Stress: Not on file   Social Connections: Not on file   Interpersonal Safety: Unknown (3/25/2025)    Interpersonal Safety     Do you feel physically and emotionally safe where you currently live?: Patient unable to answer     Within the past 12 months, have you been hit, slapped, kicked or otherwise physically hurt by someone?: Patient unable to answer     Within the past 12 months, have you been humiliated or emotionally abused in other ways by your partner or ex-partner?: Patient unable to answer   Housing Stability: Unknown (3/25/2025)    Housing Stability     Do you have housing? : Patient unable to answer     Are you worried about losing your housing?: Patient unable to answer         Objective    BP (!) 151/83 (BP Location: Right arm, Patient Position: Standing, Cuff Size: Adult Regular)   Pulse 95   Resp 16   Ht 1.6 m (5' 2.99\")   SpO2 98%   BMI 24.38 kg/m    Body mass index is 24.38 kg/m .  Physical Exam   GENERAL: alert and no distress  NECK: no adenopathy, no asymmetry, masses, or scars  RESP: lungs clear to auscultation - no rales, rhonchi or wheezes  CV: regular rate and rhythm, normal S1 S2, no S3 or S4, no murmur, click or rub, no peripheral edema  ABDOMEN: soft, nontender, no hepatosplenomegaly, no masses and bowel sounds normal  MS: no gross musculoskeletal defects noted, no edema          Signed Electronically by: Ronnie Kellogg MD    "

## 2025-04-28 ENCOUNTER — PATIENT OUTREACH (OUTPATIENT)
Dept: CARE COORDINATION | Facility: CLINIC | Age: 65
End: 2025-04-28
Payer: COMMERCIAL

## 2025-04-28 ENCOUNTER — TELEPHONE (OUTPATIENT)
Dept: ORTHOPEDICS | Facility: CLINIC | Age: 65
End: 2025-04-28
Payer: COMMERCIAL

## 2025-04-28 NOTE — TELEPHONE ENCOUNTER
Other: Fernanda([facility]) and is wondering if the provider has any new order for range of motion for the patient upper left extremity. Please call her back.     Could we send this information to you in [employer] or would you prefer to receive a phone call?:   Patient would prefer a phone call   Okay to leave a detailed message?: Yes at Other phone number:  [phone] *

## 2025-04-29 NOTE — TELEPHONE ENCOUNTER
I called and LVM for Fernanda providing verbal orders from Dr. Baker to continue gentle ROM of fingers and begin gentle ROM of the wrist. Call back information was provided should she have any additional questions.

## 2025-04-30 ENCOUNTER — DOCUMENTATION ONLY (OUTPATIENT)
Dept: FAMILY MEDICINE | Facility: CLINIC | Age: 65
End: 2025-04-30
Payer: COMMERCIAL

## 2025-04-30 NOTE — PROGRESS NOTES
Type of Form Received: [facility] SWO:Incontinence [id number]    Form Received (Date) 4/29/25   Form Filled out No   Placed in provider folder Yes

## 2025-05-01 LAB
ALBUMIN UR-MCNC: 30 MG/DL
APPEARANCE UR: ABNORMAL
BACTERIA #/AREA URNS HPF: ABNORMAL /HPF
BILIRUB UR QL STRIP: NEGATIVE
COLOR UR AUTO: YELLOW
GLUCOSE UR STRIP-MCNC: 70 MG/DL
HGB UR QL STRIP: NEGATIVE
KETONES UR STRIP-MCNC: NEGATIVE MG/DL
LEUKOCYTE ESTERASE UR QL STRIP: ABNORMAL
MUCOUS THREADS #/AREA URNS LPF: PRESENT /LPF
NITRATE UR QL: POSITIVE
PH UR STRIP: 5 [PH] (ref 5–7)
RBC URINE: 1 /HPF
SP GR UR STRIP: 1.02 (ref 1–1.03)
SQUAMOUS EPITHELIAL: 3 /HPF
UROBILINOGEN UR STRIP-MCNC: NORMAL MG/DL
WBC URINE: 9 /HPF

## 2025-05-01 PROCEDURE — 99000 SPECIMEN HANDLING OFFICE-LAB: CPT | Performed by: PATHOLOGY

## 2025-05-01 PROCEDURE — 87186 SC STD MICRODIL/AGAR DIL: CPT | Performed by: FAMILY MEDICINE

## 2025-05-02 ENCOUNTER — MEDICAL CORRESPONDENCE (OUTPATIENT)
Dept: HEALTH INFORMATION MANAGEMENT | Facility: CLINIC | Age: 65
End: 2025-05-02

## 2025-05-03 DIAGNOSIS — N39.0 URINARY TRACT INFECTION WITHOUT HEMATURIA, SITE UNSPECIFIED: Primary | ICD-10-CM

## 2025-05-03 RX ORDER — CEPHALEXIN 500 MG/1
500 CAPSULE ORAL 3 TIMES DAILY
Qty: 21 CAPSULE | Refills: 0 | Status: SHIPPED | OUTPATIENT
Start: 2025-05-03

## 2025-05-04 DIAGNOSIS — E10.59 TYPE 1 DIABETES MELLITUS WITH OTHER CIRCULATORY COMPLICATION (H): ICD-10-CM

## 2025-05-06 ENCOUNTER — DOCUMENTATION ONLY (OUTPATIENT)
Dept: FAMILY MEDICINE | Facility: CLINIC | Age: 65
End: 2025-05-06
Payer: COMMERCIAL

## 2025-05-06 NOTE — PROGRESS NOTES
Type of Form Received: [facility] [id number]    Form Received (Date) 5/6/25   Form Filled out Yes, faxed 5/8   Placed in provider folder Yes

## 2025-05-06 NOTE — TELEPHONE ENCOUNTER
Last Written Prescription:  Glucagon (GVOKE HYPOPEN) 1 MG/0.2ML pen 2 each 1 10/7/2024 -- No   Sig: Inject the contents of 1 device under the skin into lower abdomen, outer thigh, or outer upper arm as needed for hypoglycemia. If no response after 15 minutes, additional 1 mg dose from a new device may be injected while waiting for emergency assistance.     ----------------------  Last Visit Date:   4/24/2025  [facility]      Future Visit Date: 5/29/2025  ----------------------      Refill decision: Medication refilled per  Medication Refill in Ambulatory Care  policy.          Request from pharmacy:  Requested Prescriptions   Pending Prescriptions Disp Refills    Glucagon (GVOKE HYPOPEN) 1 MG/0.2ML pen 2 each 1     Sig: Inject the contents of 1 device under the skin into lower abdomen, outer thigh, or outer upper arm as needed for hypoglycemia. If no response after 15 minutes, additional 1 mg dose from a new device may be injected while waiting for emergency assistance.       Glucagon Protocol Passed - 5/6/2025  3:01 PM        Passed - Medication is active on med list and the sig matches. RN to manually verify dose and sig if red X/fail.     If the protocol passes (green check), you do not need to verify med dose and sig.    A prescription matches if they are the same clinical intention.    For Example: once daily and every morning are the same.    The protocol can not identify upper and lower case letters as matching and will fail.     For Example: Take 1 tablet (50 mg) by mouth daily     TAKE 1 TABLET (50 MG) BY MOUTH DAILY    For all fails (red x), verify dose and sig.    If the refill does match what is on file, the RN can still proceed to approve the refill request.       If they do not match, route to the appropriate provider.             Passed - Patient is 18 years of age or older

## 2025-05-07 ENCOUNTER — HOSPITAL ENCOUNTER (INPATIENT)
Facility: CLINIC | Age: 65
End: 2025-05-07
Attending: STUDENT IN AN ORGANIZED HEALTH CARE EDUCATION/TRAINING PROGRAM | Admitting: INTERNAL MEDICINE
Payer: COMMERCIAL

## 2025-05-07 ENCOUNTER — MEDICAL CORRESPONDENCE (OUTPATIENT)
Dept: HEALTH INFORMATION MANAGEMENT | Facility: CLINIC | Age: 65
End: 2025-05-07

## 2025-05-07 ENCOUNTER — APPOINTMENT (OUTPATIENT)
Dept: CT IMAGING | Facility: CLINIC | Age: 65
DRG: 064 | End: 2025-05-07
Attending: STUDENT IN AN ORGANIZED HEALTH CARE EDUCATION/TRAINING PROGRAM
Payer: COMMERCIAL

## 2025-05-07 ENCOUNTER — APPOINTMENT (OUTPATIENT)
Dept: CT IMAGING | Facility: CLINIC | Age: 65
End: 2025-05-07
Attending: STUDENT IN AN ORGANIZED HEALTH CARE EDUCATION/TRAINING PROGRAM
Payer: COMMERCIAL

## 2025-05-07 DIAGNOSIS — Z91.89 AT HIGH RISK FOR SKIN BREAKDOWN: ICD-10-CM

## 2025-05-07 DIAGNOSIS — Z71.89 OTHER SPECIFIED COUNSELING: Chronic | ICD-10-CM

## 2025-05-07 DIAGNOSIS — R53.1 WEAKNESS: ICD-10-CM

## 2025-05-07 DIAGNOSIS — E10.59 TYPE 1 DIABETES MELLITUS WITH OTHER CIRCULATORY COMPLICATION (H): ICD-10-CM

## 2025-05-07 DIAGNOSIS — R47.81 SLURRED SPEECH: Primary | ICD-10-CM

## 2025-05-07 DIAGNOSIS — N39.0 ACUTE UTI: ICD-10-CM

## 2025-05-07 DIAGNOSIS — R00.0 TACHYCARDIA, UNSPECIFIED: ICD-10-CM

## 2025-05-07 DIAGNOSIS — N39.0 URINARY TRACT INFECTION WITHOUT HEMATURIA, SITE UNSPECIFIED: ICD-10-CM

## 2025-05-07 DIAGNOSIS — I69.992: ICD-10-CM

## 2025-05-07 DIAGNOSIS — Z51.5 END OF LIFE CARE: ICD-10-CM

## 2025-05-07 DIAGNOSIS — R56.9 SEIZURES (H): ICD-10-CM

## 2025-05-07 LAB
ALBUMIN SERPL BCG-MCNC: 3.7 G/DL (ref 3.5–5.2)
ALBUMIN UR-MCNC: 20 MG/DL
ALP SERPL-CCNC: 208 U/L (ref 40–150)
ALT SERPL W P-5'-P-CCNC: 22 U/L (ref 0–50)
ANION GAP SERPL CALCULATED.3IONS-SCNC: 13 MMOL/L (ref 7–15)
APPEARANCE UR: CLEAR
APTT PPP: 28 SECONDS (ref 22–38)
AST SERPL W P-5'-P-CCNC: 28 U/L (ref 0–45)
ATRIAL RATE - MUSE: 103 BPM
ATRIAL RATE - MUSE: 104 BPM
BASE EXCESS BLDV CALC-SCNC: -4 MMOL/L (ref -3–3)
BASOPHILS # BLD AUTO: 0 10E3/UL (ref 0–0.2)
BASOPHILS NFR BLD AUTO: 0 %
BILIRUB SERPL-MCNC: 0.3 MG/DL
BILIRUB UR QL STRIP: NEGATIVE
BUN SERPL-MCNC: 37.2 MG/DL (ref 8–23)
CALCIUM SERPL-MCNC: 8.9 MG/DL (ref 8.8–10.4)
CHLORIDE SERPL-SCNC: 106 MMOL/L (ref 98–107)
COLOR UR AUTO: ABNORMAL
CREAT BLD-MCNC: 1.8 MG/DL (ref 0.5–1)
CREAT SERPL-MCNC: 1.59 MG/DL (ref 0.51–0.95)
DIASTOLIC BLOOD PRESSURE - MUSE: NORMAL MMHG
DIASTOLIC BLOOD PRESSURE - MUSE: NORMAL MMHG
EGFRCR SERPLBLD CKD-EPI 2021: 31 ML/MIN/1.73M2
EGFRCR SERPLBLD CKD-EPI 2021: 35 ML/MIN/1.73M2
EOSINOPHIL # BLD AUTO: 0.1 10E3/UL (ref 0–0.7)
EOSINOPHIL NFR BLD AUTO: 1 %
ERYTHROCYTE [DISTWIDTH] IN BLOOD BY AUTOMATED COUNT: 15 % (ref 10–15)
GLUCOSE BLDC GLUCOMTR-MCNC: 369 MG/DL (ref 70–99)
GLUCOSE BLDC GLUCOMTR-MCNC: 421 MG/DL (ref 70–99)
GLUCOSE SERPL-MCNC: 413 MG/DL (ref 70–99)
GLUCOSE UR STRIP-MCNC: >2000 MG/DL
HCO3 BLDV-SCNC: 22 MMOL/L (ref 21–28)
HCO3 SERPL-SCNC: 19 MMOL/L (ref 22–29)
HCT VFR BLD AUTO: 33.4 % (ref 35–47)
HGB BLD-MCNC: 10 G/DL (ref 11.7–15.7)
HGB UR QL STRIP: NEGATIVE
IMM GRANULOCYTES # BLD: 0 10E3/UL
IMM GRANULOCYTES NFR BLD: 0 %
INR PPP: 1 (ref 0.85–1.15)
INTERPRETATION ECG - MUSE: NORMAL
INTERPRETATION ECG - MUSE: NORMAL
KETONES UR STRIP-MCNC: 20 MG/DL
LACTATE SERPL-SCNC: 1.2 MMOL/L (ref 0.7–2)
LEUKOCYTE ESTERASE UR QL STRIP: ABNORMAL
LYMPHOCYTES # BLD AUTO: 1.1 10E3/UL (ref 0.8–5.3)
LYMPHOCYTES NFR BLD AUTO: 19 %
MCH RBC QN AUTO: 27 PG (ref 26.5–33)
MCHC RBC AUTO-ENTMCNC: 29.9 G/DL (ref 31.5–36.5)
MCV RBC AUTO: 90 FL (ref 78–100)
MONOCYTES # BLD AUTO: 0.3 10E3/UL (ref 0–1.3)
MONOCYTES NFR BLD AUTO: 5 %
MUCOUS THREADS #/AREA URNS LPF: PRESENT /LPF
NEUTROPHILS # BLD AUTO: 4.3 10E3/UL (ref 1.6–8.3)
NEUTROPHILS NFR BLD AUTO: 75 %
NITRATE UR QL: NEGATIVE
NRBC # BLD AUTO: 0 10E3/UL
NRBC BLD AUTO-RTO: 0 /100
O2/TOTAL GAS SETTING VFR VENT: 21 %
OXYHGB MFR BLDV: 43 % (ref 70–75)
P AXIS - MUSE: 34 DEGREES
P AXIS - MUSE: 66 DEGREES
PCO2 BLDV: 42 MM HG (ref 40–50)
PH BLDV: 7.33 [PH] (ref 7.32–7.43)
PH UR STRIP: 5.5 [PH] (ref 5–7)
PLATELET # BLD AUTO: 222 10E3/UL (ref 150–450)
PO2 BLDV: 27 MM HG (ref 25–47)
POTASSIUM SERPL-SCNC: 4.9 MMOL/L (ref 3.4–5.3)
PR INTERVAL - MUSE: 138 MS
PR INTERVAL - MUSE: 164 MS
PROT SERPL-MCNC: 6.3 G/DL (ref 6.4–8.3)
PROTHROMBIN TIME: 13.5 SECONDS (ref 11.8–14.8)
QRS DURATION - MUSE: 100 MS
QRS DURATION - MUSE: 150 MS
QT - MUSE: 352 MS
QT - MUSE: 392 MS
QTC - MUSE: 462 MS
QTC - MUSE: 513 MS
R AXIS - MUSE: 188 DEGREES
R AXIS - MUSE: 82 DEGREES
RADIOLOGIST FLAGS: ABNORMAL
RADIOLOGIST FLAGS: ABNORMAL
RBC # BLD AUTO: 3.71 10E6/UL (ref 3.8–5.2)
RBC URINE: <1 /HPF
SAO2 % BLDV: 43.6 % (ref 70–75)
SODIUM SERPL-SCNC: 138 MMOL/L (ref 135–145)
SP GR UR STRIP: 1.03 (ref 1–1.03)
SQUAMOUS EPITHELIAL: 2 /HPF
SYSTOLIC BLOOD PRESSURE - MUSE: NORMAL MMHG
SYSTOLIC BLOOD PRESSURE - MUSE: NORMAL MMHG
T AXIS - MUSE: 201 DEGREES
T AXIS - MUSE: 86 DEGREES
TROPONIN T SERPL HS-MCNC: 50 NG/L
TROPONIN T SERPL HS-MCNC: 52 NG/L
UROBILINOGEN UR STRIP-MCNC: NORMAL MG/DL
VENTRICULAR RATE- MUSE: 103 BPM
VENTRICULAR RATE- MUSE: 104 BPM
WBC # BLD AUTO: 5.7 10E3/UL (ref 4–11)
WBC URINE: 49 /HPF

## 2025-05-07 PROCEDURE — 0042T CT HEAD PERFUSION W CONTRAST: CPT

## 2025-05-07 PROCEDURE — 70450 CT HEAD/BRAIN W/O DYE: CPT

## 2025-05-07 PROCEDURE — 84295 ASSAY OF SERUM SODIUM: CPT | Performed by: PHYSICIAN ASSISTANT

## 2025-05-07 PROCEDURE — 82565 ASSAY OF CREATININE: CPT

## 2025-05-07 PROCEDURE — 70498 CT ANGIOGRAPHY NECK: CPT

## 2025-05-07 PROCEDURE — 999N000127 HC STATISTIC PERIPHERAL IV START W US GUIDANCE

## 2025-05-07 PROCEDURE — 81001 URINALYSIS AUTO W/SCOPE: CPT | Performed by: STUDENT IN AN ORGANIZED HEALTH CARE EDUCATION/TRAINING PROGRAM

## 2025-05-07 PROCEDURE — 36415 COLL VENOUS BLD VENIPUNCTURE: CPT | Performed by: STUDENT IN AN ORGANIZED HEALTH CARE EDUCATION/TRAINING PROGRAM

## 2025-05-07 PROCEDURE — 84155 ASSAY OF PROTEIN SERUM: CPT | Performed by: STUDENT IN AN ORGANIZED HEALTH CARE EDUCATION/TRAINING PROGRAM

## 2025-05-07 PROCEDURE — 70496 CT ANGIOGRAPHY HEAD: CPT | Mod: 26 | Performed by: RADIOLOGY

## 2025-05-07 PROCEDURE — 0042T CT HEAD PERFUSION W CONTRAST: CPT | Mod: GC | Performed by: RADIOLOGY

## 2025-05-07 PROCEDURE — 87086 URINE CULTURE/COLONY COUNT: CPT | Performed by: STUDENT IN AN ORGANIZED HEALTH CARE EDUCATION/TRAINING PROGRAM

## 2025-05-07 PROCEDURE — 83605 ASSAY OF LACTIC ACID: CPT | Performed by: STUDENT IN AN ORGANIZED HEALTH CARE EDUCATION/TRAINING PROGRAM

## 2025-05-07 PROCEDURE — 70496 CT ANGIOGRAPHY HEAD: CPT

## 2025-05-07 PROCEDURE — 82962 GLUCOSE BLOOD TEST: CPT

## 2025-05-07 PROCEDURE — 96374 THER/PROPH/DIAG INJ IV PUSH: CPT | Performed by: STUDENT IN AN ORGANIZED HEALTH CARE EDUCATION/TRAINING PROGRAM

## 2025-05-07 PROCEDURE — 258N000003 HC RX IP 258 OP 636: Performed by: STUDENT IN AN ORGANIZED HEALTH CARE EDUCATION/TRAINING PROGRAM

## 2025-05-07 PROCEDURE — 120N000002 HC R&B MED SURG/OB UMMC

## 2025-05-07 PROCEDURE — 85610 PROTHROMBIN TIME: CPT | Performed by: STUDENT IN AN ORGANIZED HEALTH CARE EDUCATION/TRAINING PROGRAM

## 2025-05-07 PROCEDURE — 84484 ASSAY OF TROPONIN QUANT: CPT | Performed by: STUDENT IN AN ORGANIZED HEALTH CARE EDUCATION/TRAINING PROGRAM

## 2025-05-07 PROCEDURE — 81003 URINALYSIS AUTO W/O SCOPE: CPT | Performed by: STUDENT IN AN ORGANIZED HEALTH CARE EDUCATION/TRAINING PROGRAM

## 2025-05-07 PROCEDURE — 999N000285 HC STATISTIC VASC ACCESS LAB DRAW WITH PIV START

## 2025-05-07 PROCEDURE — 85730 THROMBOPLASTIN TIME PARTIAL: CPT | Performed by: STUDENT IN AN ORGANIZED HEALTH CARE EDUCATION/TRAINING PROGRAM

## 2025-05-07 PROCEDURE — 250N000011 HC RX IP 250 OP 636: Performed by: STUDENT IN AN ORGANIZED HEALTH CARE EDUCATION/TRAINING PROGRAM

## 2025-05-07 PROCEDURE — 99291 CRITICAL CARE FIRST HOUR: CPT | Mod: 24 | Performed by: STUDENT IN AN ORGANIZED HEALTH CARE EDUCATION/TRAINING PROGRAM

## 2025-05-07 PROCEDURE — 93005 ELECTROCARDIOGRAM TRACING: CPT | Performed by: STUDENT IN AN ORGANIZED HEALTH CARE EDUCATION/TRAINING PROGRAM

## 2025-05-07 PROCEDURE — 250N000009 HC RX 250: Performed by: STUDENT IN AN ORGANIZED HEALTH CARE EDUCATION/TRAINING PROGRAM

## 2025-05-07 PROCEDURE — 82805 BLOOD GASES W/O2 SATURATION: CPT | Performed by: STUDENT IN AN ORGANIZED HEALTH CARE EDUCATION/TRAINING PROGRAM

## 2025-05-07 PROCEDURE — 80053 COMPREHEN METABOLIC PANEL: CPT | Performed by: STUDENT IN AN ORGANIZED HEALTH CARE EDUCATION/TRAINING PROGRAM

## 2025-05-07 PROCEDURE — 85025 COMPLETE CBC W/AUTO DIFF WBC: CPT | Performed by: STUDENT IN AN ORGANIZED HEALTH CARE EDUCATION/TRAINING PROGRAM

## 2025-05-07 PROCEDURE — 250N000013 HC RX MED GY IP 250 OP 250 PS 637: Performed by: STUDENT IN AN ORGANIZED HEALTH CARE EDUCATION/TRAINING PROGRAM

## 2025-05-07 PROCEDURE — 999N000175 HC STATISTIC STROKE CODE W/ ACCESS

## 2025-05-07 PROCEDURE — 93010 ELECTROCARDIOGRAM REPORT: CPT | Performed by: STUDENT IN AN ORGANIZED HEALTH CARE EDUCATION/TRAINING PROGRAM

## 2025-05-07 PROCEDURE — 93005 ELECTROCARDIOGRAM TRACING: CPT | Mod: 76 | Performed by: STUDENT IN AN ORGANIZED HEALTH CARE EDUCATION/TRAINING PROGRAM

## 2025-05-07 PROCEDURE — 99285 EMERGENCY DEPT VISIT HI MDM: CPT | Mod: 25 | Performed by: STUDENT IN AN ORGANIZED HEALTH CARE EDUCATION/TRAINING PROGRAM

## 2025-05-07 PROCEDURE — 99223 1ST HOSP IP/OBS HIGH 75: CPT | Mod: GC | Performed by: INTERNAL MEDICINE

## 2025-05-07 PROCEDURE — 99285 EMERGENCY DEPT VISIT HI MDM: CPT | Performed by: STUDENT IN AN ORGANIZED HEALTH CARE EDUCATION/TRAINING PROGRAM

## 2025-05-07 PROCEDURE — 70498 CT ANGIOGRAPHY NECK: CPT | Mod: 26 | Performed by: RADIOLOGY

## 2025-05-07 RX ORDER — NICOTINE POLACRILEX 4 MG
15-30 LOZENGE BUCCAL
Status: DISCONTINUED | OUTPATIENT
Start: 2025-05-07 | End: 2025-06-12 | Stop reason: HOSPADM

## 2025-05-07 RX ORDER — HYDRALAZINE HYDROCHLORIDE 20 MG/ML
5 INJECTION INTRAMUSCULAR; INTRAVENOUS EVERY 4 HOURS PRN
Status: DISCONTINUED | OUTPATIENT
Start: 2025-05-07 | End: 2025-05-29

## 2025-05-07 RX ORDER — CEFTRIAXONE 1 G/1
1 INJECTION, POWDER, FOR SOLUTION INTRAMUSCULAR; INTRAVENOUS ONCE
Status: COMPLETED | OUTPATIENT
Start: 2025-05-07 | End: 2025-05-08

## 2025-05-07 RX ORDER — DULOXETIN HYDROCHLORIDE 20 MG/1
20 CAPSULE, DELAYED RELEASE ORAL DAILY
Status: DISCONTINUED | OUTPATIENT
Start: 2025-05-08 | End: 2025-05-29

## 2025-05-07 RX ORDER — ATORVASTATIN CALCIUM 40 MG/1
40 TABLET, FILM COATED ORAL DAILY
Status: DISCONTINUED | OUTPATIENT
Start: 2025-05-08 | End: 2025-05-13

## 2025-05-07 RX ORDER — CARVEDILOL 3.12 MG/1
6.25 TABLET ORAL 2 TIMES DAILY WITH MEALS
Status: DISCONTINUED | OUTPATIENT
Start: 2025-05-07 | End: 2025-05-13

## 2025-05-07 RX ORDER — ASPIRIN 81 MG/1
81 TABLET, CHEWABLE ORAL DAILY
Status: DISCONTINUED | OUTPATIENT
Start: 2025-05-08 | End: 2025-05-14

## 2025-05-07 RX ORDER — LORATADINE 10 MG/1
10 TABLET ORAL DAILY
Status: DISCONTINUED | OUTPATIENT
Start: 2025-05-08 | End: 2025-05-29

## 2025-05-07 RX ORDER — OLANZAPINE 5 MG/1
5 TABLET, ORALLY DISINTEGRATING ORAL ONCE
Status: COMPLETED | OUTPATIENT
Start: 2025-05-07 | End: 2025-05-07

## 2025-05-07 RX ORDER — AMOXICILLIN 250 MG
1 CAPSULE ORAL 2 TIMES DAILY PRN
Status: DISCONTINUED | OUTPATIENT
Start: 2025-05-07 | End: 2025-05-29

## 2025-05-07 RX ORDER — DEXTROSE MONOHYDRATE 25 G/50ML
25-50 INJECTION, SOLUTION INTRAVENOUS
Status: DISCONTINUED | OUTPATIENT
Start: 2025-05-07 | End: 2025-06-12 | Stop reason: HOSPADM

## 2025-05-07 RX ORDER — POLYETHYLENE GLYCOL 3350 17 G/17G
17 POWDER, FOR SOLUTION ORAL 2 TIMES DAILY PRN
Status: DISCONTINUED | OUTPATIENT
Start: 2025-05-07 | End: 2025-05-29

## 2025-05-07 RX ORDER — CEFTRIAXONE 1 G/1
1 INJECTION, POWDER, FOR SOLUTION INTRAMUSCULAR; INTRAVENOUS ONCE
Status: COMPLETED | OUTPATIENT
Start: 2025-05-08 | End: 2025-05-08

## 2025-05-07 RX ORDER — LORAZEPAM 2 MG/ML
0.5 INJECTION INTRAMUSCULAR ONCE
Status: COMPLETED | OUTPATIENT
Start: 2025-05-07 | End: 2025-05-08

## 2025-05-07 RX ORDER — IOPAMIDOL 755 MG/ML
117 INJECTION, SOLUTION INTRAVASCULAR ONCE
Status: COMPLETED | OUTPATIENT
Start: 2025-05-07 | End: 2025-05-07

## 2025-05-07 RX ORDER — LEVOTHYROXINE SODIUM 88 UG/1
88 TABLET ORAL
Status: DISCONTINUED | OUTPATIENT
Start: 2025-05-08 | End: 2025-05-13

## 2025-05-07 RX ORDER — AMOXICILLIN 250 MG
2 CAPSULE ORAL 2 TIMES DAILY PRN
Status: DISCONTINUED | OUTPATIENT
Start: 2025-05-07 | End: 2025-05-29

## 2025-05-07 RX ORDER — LEVETIRACETAM 500 MG/5ML
2000 INJECTION, SOLUTION, CONCENTRATE INTRAVENOUS ONCE
Status: COMPLETED | OUTPATIENT
Start: 2025-05-07 | End: 2025-05-07

## 2025-05-07 RX ORDER — LANOLIN ALCOHOL/MO/W.PET/CERES
1000 CREAM (GRAM) TOPICAL DAILY
Status: DISCONTINUED | OUTPATIENT
Start: 2025-05-08 | End: 2025-05-29

## 2025-05-07 RX ORDER — HEPARIN SODIUM 5000 [USP'U]/.5ML
5000 INJECTION, SOLUTION INTRAVENOUS; SUBCUTANEOUS EVERY 8 HOURS
Status: DISCONTINUED | OUTPATIENT
Start: 2025-05-07 | End: 2025-05-29

## 2025-05-07 RX ORDER — GABAPENTIN 100 MG/1
100 CAPSULE ORAL AT BEDTIME
Status: DISCONTINUED | OUTPATIENT
Start: 2025-05-07 | End: 2025-05-13

## 2025-05-07 RX ADMIN — LEVETIRACETAM 2000 MG: 100 INJECTION, SOLUTION INTRAVENOUS at 16:15

## 2025-05-07 RX ADMIN — IOPAMIDOL 117 ML: 755 INJECTION, SOLUTION INTRAVENOUS at 13:43

## 2025-05-07 RX ADMIN — SODIUM CHLORIDE 1000 ML: 0.9 INJECTION, SOLUTION INTRAVENOUS at 22:34

## 2025-05-07 RX ADMIN — OLANZAPINE 5 MG: 5 TABLET, ORALLY DISINTEGRATING ORAL at 20:32

## 2025-05-07 RX ADMIN — CEFTRIAXONE SODIUM 1 G: 1 INJECTION, POWDER, FOR SOLUTION INTRAMUSCULAR; INTRAVENOUS at 22:35

## 2025-05-07 RX ADMIN — SODIUM CHLORIDE, PRESERVATIVE FREE 100 ML: 5 INJECTION INTRAVENOUS at 13:43

## 2025-05-07 ASSESSMENT — ACTIVITIES OF DAILY LIVING (ADL)
ADLS_ACUITY_SCORE: 64
ADLS_ACUITY_SCORE: 64
ADLS_ACUITY_SCORE: 68
ADLS_ACUITY_SCORE: 64
ADLS_ACUITY_SCORE: 64
ADLS_ACUITY_SCORE: 68
ADLS_ACUITY_SCORE: 62
ADLS_ACUITY_SCORE: 68

## 2025-05-07 ASSESSMENT — COLUMBIA-SUICIDE SEVERITY RATING SCALE - C-SSRS: IS THE PATIENT NOT ABLE TO COMPLETE C-SSRS: UNABLE TO VERBALIZE

## 2025-05-07 NOTE — ED PROVIDER NOTES
[place] EMERGENCY DEPARTMENT ([facility])    5/07/25       ED PROVIDER NOTE    History     Chief Complaint   Patient presents with    Stroke Symptoms     The history is provided by medical records and a relative. The history is limited by the condition of the patient.     Isabell Matthews is a 64 year old female with a past medical history of multiple CVAs w/ residual right facial droop (most recent 6/2024), HTN, HLD, CKD IIIb, DM1 c/b neuropathy, orthostatic hypotension, seizures, ischemic vascular dementia, recurrent UTI, and frequent falls who presents to the emergency department with her daughter for stroke like symptoms. Patients daughter drove the patient to [place] and arrived yesterday night. Patient had slept for most of the car ride. Patients daughter noticed the patient was exhibiting slurred speech at 9:30 pm yesterday shortly after arrival. Her daughter reports the patient has sustained deficits from prior strokes but severity of current slurred speech is abnormal. Her daughter has also noticed the patient had deficits of her right leg strength. After noticing this her daughter drove back to the [facility] for the patient to be seen at this ED. The patient has had difficulty swallowing. She has not eaten or drank anything recently. She did not take her prescription medications last night or this morning including her long-acting insulin. Patient has a current UTI and was started on antibiotics. She has been treated with 1 day of antibiotics.    Past Medical History  Past Medical History:   Diagnosis Date    Chronic pain     Coronary atherosclerosis 06/14/2016    Essential hypertension     Ex-cigarette smoker     quit 7/2018 over 35 years    Ex-cigarette smoker     Hyperlipidemia     Hypothyroid     Seizures (H)     Stroke (H)     Vascular dementia (H)      Past Surgical History:   Procedure Laterality Date    athroplasty hip Bilateral     2003, 2006    CATARACT IOL, RT/LT      OTHER  SURGICAL HISTORY      athroplasty hip    PICC DOUBLE LUMEN PLACEMENT Right 06/11/2023    right basilic 5 fr dl power picc 39 cm    STENT       acetaminophen (TYLENOL) 325 MG tablet  aspirin (ASA) 81 MG chewable tablet  atorvastatin (LIPITOR) 40 MG tablet  blood glucose (NO BRAND SPECIFIED) test strip  blood glucose monitoring (ONE TOUCH ULTRA MINI) meter device kit  carvedilol (COREG) 6.25 MG tablet  cephALEXin (KEFLEX) 500 MG capsule  chlorhexidine (PERIDEX) 0.12 % solution  Continuous Glucose Sensor (FREESTYLE MARIA FERNANDA 2 PLUS SENSOR) MISC  Continuous Glucose Sensor (FREESTYLE MARIA FERNANDA 2 SENSOR) MISC  cyanocobalamin (VITAMIN B-12) 1000 MCG tablet  diclofenac (VOLTAREN) 1 % topical gel  DULoxetine (CYMBALTA) 20 MG capsule  gabapentin (NEURONTIN) 100 MG capsule  Glucagon (GVOKE HYPOPEN) 1 MG/0.2ML pen  hydrALAZINE (APRESOLINE) 10 MG tablet  insulin aspart (NOVOLOG FLEXPEN) 100 UNIT/ML pen  insulin aspart (NOVOLOG FLEXPEN) 100 UNIT/ML pen  insulin glargine (LANTUS PEN) 100 UNIT/ML pen  levETIRAcetam (KEPPRA) 250 MG tablet  levETIRAcetam (KEPPRA) 500 MG tablet  levothyroxine (SYNTHROID/LEVOTHROID) 100 MCG tablet  levothyroxine (SYNTHROID/LEVOTHROID) 100 MCG tablet  Lidocaine (LIDOCARE) 4 % Patch  loratadine (CLARITIN) 10 MG tablet  meclizine (ANTIVERT) 12.5 MG tablet  methocarbamol (ROBAXIN) 500 MG tablet  PENTIPS 32G X 4 MM miscellaneous  SYNTHROID 88 MCG tablet  zinc oxide (DESITIN) 20 % external ointment      Allergies   Allergen Reactions    Seasonal Allergies      Family History  Family History   Problem Relation Age of Onset    Acute Myocardial Infarction Father     Heart Disease Maternal Grandmother     Dementia Maternal Grandmother     Myocardial Infarction Father     Dementia Paternal Grandmother     Heart Disease Paternal Grandmother      Social History   Social History     Tobacco Use    Smoking status: Former     Current packs/day: 0.00     Average packs/day: 0.5 packs/day for 35.0 years (17.5 ttl pk-yrs)      Types: Cigarettes     Start date: 1983     Quit date: 2018     Years since quittin.8    Smokeless tobacco: Never   Substance Use Topics    Alcohol use: Not Currently    Drug use: Not Currently      A complete review of systems was performed with pertinent positives and negatives noted in the HPI, and all other systems negative.    Physical Exam   BP: (!) 147/76  Pulse: 103  Temp: 99.2  F (37.3  C)  Resp: 16  SpO2: 94 %  Physical Exam  Constitutional:       General: She is not in acute distress.     Appearance: Normal appearance. She is not diaphoretic.   HENT:      Head: Atraumatic.      Mouth/Throat:      Mouth: Mucous membranes are moist.   Eyes:      General: No scleral icterus.     Extraocular Movements: Extraocular movements intact.      Conjunctiva/sclera: Conjunctivae normal.      Pupils: Pupils are equal, round, and reactive to light.   Cardiovascular:      Rate and Rhythm: Normal rate.      Heart sounds: Normal heart sounds.   Pulmonary:      Effort: No respiratory distress.      Breath sounds: Normal breath sounds. No stridor. No wheezing, rhonchi or rales.   Chest:      Chest wall: No tenderness.   Abdominal:      General: Abdomen is flat. There is no distension.      Palpations: There is no mass.      Tenderness: There is no abdominal tenderness. There is no guarding or rebound.      Hernia: No hernia is present.   Musculoskeletal:      Cervical back: Neck supple.   Skin:     General: Skin is warm.      Findings: No rash.   Neurological:      Mental Status: She is alert.      Cranial Nerves: No cranial nerve deficit.      Sensory: No sensory deficit.      Motor: No weakness.      Comments: Patient with decreased mental status per daughter,  no facial droop visualized            ED Course, Procedures, & Data      Procedures            EKG Interpretation:      Interpreted by Nathan Austin MD   Time reviewed: 14:12:43  Symptoms at time of EKG: stroke like symptoms    Rhythm: sinus  tachycardia  Rate: 104 bpm  Axis: Normal  Ectopy: none  Conduction: normal  ST Segments/ T Waves: Non-specific ST-T wave abnormality   Q Waves: none  Comparison to prior: No old EKG available  Clinical Impression: no acute ischemia                  Results for orders placed or performed during the hospital encounter of 05/07/25   [facility] Draw     Status: None ()    Narrative    The following orders were created for panel order [facility].  Procedure                               Abnormality         Status                     ---------                               -----------         ------                     Extra Blue Top Tube[[id number]]                                                        Extra Red Top Tube[[id number]]                                                         Extra Green Top (Lithiu...[[id number]]                                                 Extra Purple Top Tube[[id number]]                                                        Please view results for these tests on the individual orders.     Medications - No data to display  Labs Ordered and Resulted from Time of ED Arrival to Time of ED Departure - No data to display  No orders to display          Critical care was not performed.     Medical Decision Making  The patient's presentation was of high complexity (an acute health issue posing potential threat to life or bodily function).    The patient's evaluation involved:  review of external note(s) from 3+ sources (see separate area of note for details)  review of 3+ test result(s) ordered prior to this encounter (see separate area of note for details)  ordering and/or review of 3+ test(s) in this encounter (see separate area of note for details)  discussion of management or test interpretation with another health professional (neurology)    The patient's management necessitated high risk (a decision regarding hospitalization).    Assessment & Plan    In the emergency department initially  called a tier 2 code stroke, but neurology reviewed imaging and although CTA did show some subacute changes, do not believe patient had an acute stroke  Neurology believes it is more likely worsening mental status in the context of prior CVAs, in the context of patient with a UTI  At this point I do agree with neurology plan for MRI and likely admission to medicine for UTI and recruescence given prior history of CVA  At time of signout to incoming physician, my plan is to sign patient out to internal medicine physician over the phone, but did discuss with Dr. Luis the plan in case the internal medicine physician is unable to contact me.      I have reviewed the nursing notes. I have reviewed the findings, diagnosis, plan and need for follow up with the patient.    New Prescriptions    No medications on file       Final diagnoses:   None   I, Lilian Corona, am serving as a trained medical scribe to document services personally performed by Nathan Austin MD, based on the provider's statements to me.     INathan MD, was physically present and have reviewed and verified the accuracy of this note documented by Lilian Corona.     Nathan Austin MD   [facility] EMERGENCY DEPARTMENT  5/7/2025     Nathan Austin MD  05/07/25 2001

## 2025-05-07 NOTE — LETTER
[facility] 7C MED SURG  [address]  [place] [address]  [phone]    FACSIMILE TRANSMITTAL SHEET    TO: Jeremiah Franklin Marisela Nevarez    COMPANY: [facility]iah [facility] Marisela Nevarez    FAX NUMBER: [phone]  PHONE NUMBER: [phone]    FROM: Cammie BARRIENTOS  PHONE: [phone]  DATE: 06/03/25  NUMBER OF PAGES:     _____URGENT _____REVIEW ONLY _____PLEASE COMMENT____PLEASE REPLY    NOTES/COMMENTS: Please review patient for LTC with hospice placement and call the  with any questions at [phone]. Thank you!                                       IF YOU DID NOT RECEIVE THE CORRECT NUMBER OF PAGES OR THE FAX DID NOT COME THROUGH CLEARLY, PLEASE CALL THE SENDER     CONFIDENTIALITY STATEMENT: Confidential information that may accompany this transmission contains protected health information under state and federal law and is legally privileged. This information is intended only for the use of the individual or entity named above and may be used only for carrying out treatment, payment or other healthcare operations. The recipient or person responsible for delivering this information is prohibited by law from disclosing this information without proper authorization to any other party, unless required to do so by law or regulation. If you are not the intended recipient, you are hereby notified that any review, dissemination, distribution, or copying of this message is strictly prohibited. If you have received this communication in error, please destroy the materials and contact us immediately by calling the number listed above. No response indicates that the information was received by the appropriate authorized party

## 2025-05-07 NOTE — ED TRIAGE NOTES
PT brought in by daughter for stroke like symptoms. Last night at 2100 pt daughter noticed slurred speech.     Hx stroke, current UTI on abx     Triage Assessment (Adult)       Row Name 05/07/25 1325          Respiratory WDL    Respiratory WDL WDL        Skin Circulation/Temperature WDL    Skin Circulation/Temperature WDL WDL        Cardiac WDL    Cardiac WDL X     Cardiac Rhythm ST        Peripheral/Neurovascular WDL    Peripheral Neurovascular WDL WDL        Cognitive/Neuro/Behavioral WDL    Cognitive/Neuro/Behavioral WDL X     Orientation disoriented to;place;time;situation        [place]amar Coma Scale    Best Eye Response 4-->(E4) spontaneous     Best Motor Response 6-->(M6) obeys commands     Best Verbal Response 4-->(V4) confused     Lamar Coma Scale Score 14

## 2025-05-07 NOTE — PROGRESS NOTES
BP (!) 174/82 (Cuff Size: Adult Regular)   Pulse 101   Temp 98.2  F (36.8  C) (Oral)   Resp 18   SpO2 99%     Pt is Alert to self, disoriented to place, time, and situation (pt has dementia at baseline). Pt has left facial droop and left sided weakness. Denies chest pain and SOB. Reports back ache, relieved with turning in bed. R PIV, saline locked. Continue with plan of care, notify provider with changes.

## 2025-05-07 NOTE — LETTER
[facility] 7C MED SURG  [address]  [place] [address]  [phone]    FACSIMILE TRANSMITTAL SHEET    TO: [facility]    COMPANY: [facility]    FAX NUMBER:  [phone]  PHONE NUMBER: [phone]    FROM: Cammie BARRIENTOS  PHONE: [phone]  DATE: 06/03/25  NUMBER OF PAGES:     _____URGENT _____REVIEW ONLY _____PLEASE COMMENT____PLEASE REPLY    NOTES/COMMENTS: Please review patient for LTC with hospice and call the  with any questions at [phone]. Thank you!                                       IF YOU DID NOT RECEIVE THE CORRECT NUMBER OF PAGES OR THE FAX DID NOT COME THROUGH CLEARLY, PLEASE CALL THE SENDER     CONFIDENTIALITY STATEMENT: Confidential information that may accompany this transmission contains protected health information under state and federal law and is legally privileged. This information is intended only for the use of the individual or entity named above and may be used only for carrying out treatment, payment or other healthcare operations. The recipient or person responsible for delivering this information is prohibited by law from disclosing this information without proper authorization to any other party, unless required to do so by law or regulation. If you are not the intended recipient, you are hereby notified that any review, dissemination, distribution, or copying of this message is strictly prohibited. If you have received this communication in error, please destroy the materials and contact us immediately by calling the number listed above. No response indicates that the information was received by the appropriate authorized party

## 2025-05-07 NOTE — LETTER
[facility] 7C MED SURG  [address]  [place] [address]  [phone]    FACSIMILE TRANSMITTAL SHEET    TO: [facility]    COMPANY: [facility]   FAX NUMBER: [phone]  PHONE NUMBER: [phone]     FROM: Cammie BARRIENTOS  PHONE: [phone]  DATE: 06/03/25  NUMBER OF PAGES:     _____URGENT _____REVIEW ONLY _____PLEASE COMMENT____PLEASE REPLY    NOTES/COMMENTS: Please review patient for LTC with hospice placement and call the  with any questions at [phone]. Thank you!                                       IF YOU DID NOT RECEIVE THE CORRECT NUMBER OF PAGES OR THE FAX DID NOT COME THROUGH CLEARLY, PLEASE CALL THE SENDER     CONFIDENTIALITY STATEMENT: Confidential information that may accompany this transmission contains protected health information under state and federal law and is legally privileged. This information is intended only for the use of the individual or entity named above and may be used only for carrying out treatment, payment or other healthcare operations. The recipient or person responsible for delivering this information is prohibited by law from disclosing this information without proper authorization to any other party, unless required to do so by law or regulation. If you are not the intended recipient, you are hereby notified that any review, dissemination, distribution, or copying of this message is strictly prohibited. If you have received this communication in error, please destroy the materials and contact us immediately by calling the number listed above. No response indicates that the information was received by the appropriate authorized party

## 2025-05-07 NOTE — LETTER
[facility] 7C MED SURG  [address]  [place] [address]  [phone]    FACSIMILE TRANSMITTAL SHEET    TO: ***   COMPANY: ***  FAX NUMBER: ***  PHONE NUMBER: ***     FROM: ***  PHONE: ***  DATE: 05/29/25  NUMBER OF PAGES: ***    _____URGENT _____REVIEW ONLY _____PLEASE COMMENT____PLEASE REPLY    NOTES/COMMENTS: ***                                      IF YOU DID NOT RECEIVE THE CORRECT NUMBER OF PAGES OR THE FAX DID NOT COME THROUGH CLEARLY, PLEASE CALL THE SENDER     CONFIDENTIALITY STATEMENT: Confidential information that may accompany this transmission contains protected health information under state and federal law and is legally privileged. This information is intended only for the use of the individual or entity named above and may be used only for carrying out treatment, payment or other healthcare operations. The recipient or person responsible for delivering this information is prohibited by law from disclosing this information without proper authorization to any other party, unless required to do so by law or regulation. If you are not the intended recipient, you are hereby notified that any review, dissemination, distribution, or copying of this message is strictly prohibited. If you have received this communication in error, please destroy the materials and contact us immediately by calling the number listed above. No response indicates that the information was received by the appropriate authorized party

## 2025-05-07 NOTE — ED NOTES
05/07/25 1338 05/07/25 1345 05/07/25 1359   Cognitive   Cognitive/Neuro/Behavioral WDL X X X   Level of Consciousness alert;confused  (baseline dementia) alert;confused  (baseline dementia) alert;confused  (baseline dementia)   Arousal Level opens eyes spontaneously opens eyes spontaneously opens eyes spontaneously   Orientation disoriented to;place;time;situation disoriented to;place;time;situation disoriented to;place;time;situation   Speech garbled;slurred;pace/rate variance  (whispering, not strong) garbled;slurred;pace/rate variance  (whispering, not strong) garbled;slurred;pace/rate variance  (whispering, not strong)   Best Language 1 - Mild to moderate 1 - Mild to moderate 1 - Mild to moderate   Mood/Behavior cooperative cooperative cooperative   CAM (Confusion Assessment Method)   (1) Acute Onset/Fluctuating Course yes yes yes   (2) Inattention yes yes yes   (3) Disorganized Thinking no no no   (4) Altered Level of Consciousness no no no   Delirium present? no no no   Neuro Assessments   Pupil PERRLA yes yes yes   Pupil Size Left 2 mm 2 mm 2 mm   Pupil Shape Left round round round   Pupil Reaction Left brisk;equal brisk;equal brisk;equal   Pupil Accommodation Left normal response normal response normal response   Pupil Size Right 2 mm 2 mm 2 mm   Pupil Shape Right round round round   Pupil Reaction Right brisk;equal brisk;equal brisk;equal   Pupil Accommodation Right normal response normal response normal response   [place] Coma Scale   Best Eye Response 4-->(E4) spontaneous 4-->(E4) spontaneous 4-->(E4) spontaneous   Best Motor Response 6-->(M6) obeys commands 6-->(M6) obeys commands 6-->(M6) obeys commands   Best Verbal Response 4-->(V4) confused 4-->(V4) confused 4-->(V4) confused   Nina Coma Scale Score 14 14 14   Assessment Qualifiers patient not sedated/intubated patient not sedated/intubated patient not sedated/intubated   Motor Response   General Motor Response purposeful motor response  purposeful motor response purposeful motor response   Hand /Ankle Strength   Hand , Left weak weak weak   Hand , Right moderate moderate moderate   Dorsiflexion, Left weak weak weak   Dorsiflexion, Right moderate moderate moderate   Plantarflexion, Left weak weak weak   Plantarflexion, Right moderate moderate moderate   Sensory Assessment   Sensation - All Extremities no impairment no impairment no impairment   Visual Assessment (CN II, III, IV, VI)   Visual Tracking normal normal normal   Visual Neglect not observed not observed not observed   Eye Muscle Balance normal normal normal   Neglect   Neglect/Inattention Appears intact Appears intact Appears intact   Cranial Nerve Assess   Facial Symmetry Asymmetrical (describe)  (L facial droop) Asymmetrical (describe)  (L facial droop) Asymmetrical (describe)  (L facial droop)   Swallow/Gag Abnormal Abnormal Abnormal   Shoulder shrug - left Shrug shoulders and head movement side to side intact Shrug shoulders and head movement side to side intact Shrug shoulders and head movement side to side intact   Shoulder shrug - right Shrug shoulders and head movement side to side intact Shrug shoulders and head movement side to side intact Shrug shoulders and head movement side to side intact   Pronator Drift   Left Pronator Drift Present Present Present   Right Pronator Drift Absent Absent Absent   Motor Strength   Left Upper Motor Strength 3 - active movement against gravity 3 - active movement against gravity 3 - active movement against gravity   Right Upper Motor Strength 4 - active movement against gravity and some resistance 4 - active movement against gravity and some resistance 4 - active movement against gravity and some resistance   Left Lower Motor Strength 3 - active movement against gravity 3 - active movement against gravity 3 - active movement against gravity   Right Lower Motor Strength 4 - active movement against gravity and some resistance 4 - active  movement against gravity and some resistance 4 - active movement against gravity and some resistance   NIH Stroke Scale (* indicates Modified NIHSS row)   Interval baseline  --   --    NIHSS Interval Comments Stroke Code called 1331, LNW 3726 5/6. Hx ischemic and hemorrhagic strokes, last ischemic ~8 months ago and hemorrhagic about 3 years ago per family. Daughter noted UTI last week and cephalosporin started monday. Speach is quiet, patient pocketing food, tongue not articulating words, L facial droop and L sided weakness noted as exacerbated.  --   --    1a. Level of Consciousness 0-->Alert, keenly responsive 0-->Alert, keenly responsive 0-->Alert, keenly responsive   *1b. LOC Questions 0-->Answers both questions correctly 0-->Answers both questions correctly 0-->Answers both questions correctly   *1c. LOC Commands 0-->Performs both tasks correctly 0-->Performs both tasks correctly 0-->Performs both tasks correctly   *2. Best Gaze 0-->Normal 0-->Normal 0-->Normal   *3. Visual 0-->No visual loss 0-->No visual loss 0-->No visual loss   4. Facial Palsy 1-->Minor paralysis (flattened nasolabial fold, asymmetry on smiling) 1-->Minor paralysis (flattened nasolabial fold, asymmetry on smiling) 1-->Minor paralysis (flattened nasolabial fold, asymmetry on smiling)   *5a. Motor Arm, Left 1-->Drift, limb holds 90 (or 45) degrees, but drifts down before full 10 seconds, does not hit bed or other support 1-->Drift, limb holds 90 (or 45) degrees, but drifts down before full 10 seconds, does not hit bed or other support 1-->Drift, limb holds 90 (or 45) degrees, but drifts down before full 10 seconds, does not hit bed or other support   *5b. Motor Arm, Right 0-->No drift, limb holds 90 (or 45) degrees for full 10 secs 0-->No drift, limb holds 90 (or 45) degrees for full 10 secs 0-->No drift, limb holds 90 (or 45) degrees for full 10 secs   *6a. Motor Leg, Left 1-->Drift, leg falls by the end of the 5-sec period but does not hit  bed 1-->Drift, leg falls by the end of the 5-sec period but does not hit bed 1-->Drift, leg falls by the end of the 5-sec period but does not hit bed   *6b. Motor Leg, Right 0-->No drift, leg holds 30 degree position for full 5 secs 0-->No drift, leg holds 30 degree position for full 5 secs 0-->No drift, leg holds 30 degree position for full 5 secs   7. Limb Ataxia 0-->Absent 0-->Absent 0-->Absent   *8. Sensory 0-->Normal, no sensory loss 0-->Normal, no sensory loss 0-->Normal, no sensory loss   *9. Best Language 1-->Mild-to-moderate aphasia, some obvious loss of fluency or facility of comprehension, without significant limitation on ideas expressed or form of expression. Reduction of speech and/or comprehension, however, makes conversation. . . (see row details) 1-->Mild-to-moderate aphasia, some obvious loss of fluency or facility of comprehension, without significant limitation on ideas expressed or form of expression. Reduction of speech and/or comprehension, however, makes conversation. . . (see row details) 1-->Mild-to-moderate aphasia, some obvious loss of fluency or facility of comprehension, without significant limitation on ideas expressed or form of expression. Reduction of speech and/or comprehension, however, makes conversation. . . (see row details)   10. Dysarthria 1-->Mild-to-moderate dysarthria, patient slurs at least some words and, at worst, can be understood with some difficulty 1-->Mild-to-moderate dysarthria, patient slurs at least some words and, at worst, can be understood with some difficulty 1-->Mild-to-moderate dysarthria, patient slurs at least some words and, at worst, can be understood with some difficulty   *11. Extinction and Inattention (formerly Neglect) 0-->No abnormality 0-->No abnormality 0-->No abnormality   Total (NIH Stroke Scale) 5 5 5   Nursing Dysphagia Screen   Dysphagia Initial Screen Unable to assess due to clinical situation - Keep NPO  --   --            Stroke Code  Nurse-Responder Note    Arrival Time to Stroke Code: 1338    Stroke Code Team interventions:   - De-escalated at 1404 by [facility] Stroke team.    ED/Bedside Nurse providing handoff: Bela SCALES RN    Time left for CT: 1340    Time arrived to next location (ED/Unit/IR): 1405 back to ED aldana H    ED/Bedside Nurse given handoff (name/time): Bela Rene, RN

## 2025-05-07 NOTE — CONSULTS
[facility]     Stroke Code Note          History of Present Illness     Chief Complaint: Stroke Symptoms      Isabell Matthews is a 64 year old woman with historyof HTN, HLD, CKD IIIb, T1DM c/b neuropathy, ischemic and hemorrhagic strokes (right basal ganglia ischemic infarct in 2018, ICAD left basal ganglia hemorrhagic stroke 2021, right centrum semiovale & right chuck 2023) with residual R facial droop and vascular dementia, seizures 2/2 DKA, and recurrent UTI who presented to the ED on 5/7 after her daughter noted an acute change in her functional status last evening.    The patient's daughter notes that her last known well was probably around 5:30 PM on 5/6.  Around 9 PM last night she noticed that her mother, who speaks slowly at baseline, was sounding much more quiet with somewhat slurred speech which was not normal for her.  In addition to this her mother had not been eating as much as normal and seemed to be unable to swallow safely.  They stated that they normally feed her and that they found most of the food that they were feeding her in her cheeks.  In hindsight, the daughter states that they have been noticing a steady decline in her function since this past Thursday or Friday, when she had been diagnosed with a UTI of which she has a history of.  She had been taking cephalexin for this a couple of days.  Addition, there have been recent changes to her Synthroid as well as her blood pressure medications.  The patient's daughter also notes that she has not been producing much urine for the past day.    On initial evaluation in the emergency room, her NIHSS is 5 for mild aphasia, loss of fluency, mild right nasolabial fold flattening, and bilateral lower extremity drift.  Her CT revealed chronic changes from prior strokes, but no acute findings.  Similarly, her CTA head and neck were notable for multifocal stenosis of cerebral vasculature which appears  slightly progressed since January of this year, likely secondary to intracranial atherosclerotic disease.  CTP was also obtained and notable for poor perfusion in the right frontotemporal region she has chronic encephalomalacia from a prior stroke.           Past Medical History     Stroke risk factors: Higher ischemic, hemorrhagic strokes, HTN, diabetes, HLD, ICAD, CKD    Preadmission antithrombotic regimen: Aspirin 81 mg daily    Modified Timothy Score (Pre-morbid)  4-Moderately severe disability; unable to walk without assistance and unable to attend to own bodily                   Assessment and Plan       # Acute encephalopathy, likely multifactorial (dementia, active UTI, many vascular risk factors, prior strokes, seizure history)    Isabell Matthews is a 64 year old woman with historyof HTN, HLD, CKD IIIb, T1DM c/b neuropathy, ischemic and hemorrhagic strokes (right basal ganglia ischemic infarct in 2018, ICAD left basal ganglia hemorrhagic stroke 2021, right centrum semiovale & right chuck 2023) with residual R facial droop and vascular dementia, seizures 2/2 DKA, and recurrent UTI for whom a stroke code was called today for acute onset speech changes and worsening of baseline cognition.    On exam she does appear moderately encephalopathic with some difficulty speaking though it is unclear how much of this could be attributed to true aphasia.  He does have left nasolabial fold flattening, however this is her known baseline from a prior stroke.  Her exam today is largely nonfocal, and less concerning for a significant acute stroke.  Reassuringly, there is no recurrence of hemorrhagic stroke on her CT.  Given the largely nonfocal exam, more likely alternate reasons for her acute to subacute decline in functioning, and history of hemorrhagic stroke, she was deemed to not be a candidate for IV thrombolysis.  Mechanical thrombectomy was not pursued as there was no large vessel occlusion amenable to intervention on  CTA.    The patient was recently diagnosed with a UTI with likely steadier decompensation since Thursday or Friday of last week rather than just in the past day, which could to recrudescence of prior stroke symptoms like speech changes or facial droop.  In addition, the patient does have a history of seizures which is an alternate possibility for her presentation given that her extensive encephalomalacia and ongoing UTI can both lower her seizure threshold.    At this time, we would recommend MRI brain in order to more clearly rule out stroke as a possibility.  In addition, we would like to ensure that the patient does not suffering recurrent seizures.  We will plan to give her a partial Keppra load of 2 g now followed by an increase of her maintenance Keppra to 750 mg twice daily.  We will also plan to obtain a 3-hour EEG when able.  Would agree with ED and/or primary team performing toxic metabolic/infectious workup in the meantime, as this will likely be a more fruitful investigation into her current state.      Intravenous Thrombolysis  Not given due to:   - history of intracranial hemorrhage  - minor/isolated/quickly resolving symptoms  - stroke mimic: UTI, stroke recrudescence     Endovascular Treatment  Not initiated due to absence of proximal vessel occlusion     Plan:  - MRI brain  -3-hour EEG     ___________________________________________________________________    The patient was discussed with staff Dr. Wilson.    Yony Adam MD  Neurology Resident, PGY-2  05/07/2025 8:17 PM     ___________________________________________________________________        Imaging/Labs   (personally reviewed )    CT head:   Impression:   1. No acute intracranial hemorrhage.  2. ASPECT Score: 10/10.  3. Chronic encephalomalacia of the right frontotemporal  periventricular region likely related to prior infarct. Chronic small  vessel ischemic disease and generalized cerebral volume loss.    CTA head/neck and  CTP:  Impression:   1. No evidence acute infarction on the CT Perfusion examination. Poor  perfusion in the right frontotemporal region compatible chronic  encephalomalacia in the region.  2. Head CTA demonstrates no definite aneurysm is identified.  Progressed short segment stenosis/occlusion of the left A2 anterior  cerebral artery. Similar occlusion of the anterior division of the  right middle cerebral artery. Multifocal stenosis/beaded appearance of  the right middle cerebral artery M2 and M3 segments, which appears  slightly progressed from 1/16/2025 and may be related to  atherosclerotic disease.   3. Neck CTA demonstrates no hemodynamically significant stenosis of  the major cervical arteries. Less than 50%  stenosis of the right  carotid bulb and approximately 50% of calcific stenosis of the left  carotid bulb, unchanged from prior CT 1/16/2025.           Physical Examination     BP: (!) 147/76   Pulse: 103   Resp: 16   Temp: 99.2  F (37.3  C)   Temp src: Oral   SpO2: 94 %   O2 Device: None (Room air)        Wt Readings from Last 2 Encounters:   03/27/25 62.4 kg (137 lb 9.1 oz)   02/13/25 60 kg (132 lb 3.2 oz)       General Exam  General:  patient lying in bed without any acute distress    HEENT:  normocephalic/atraumatic  Cardio:  RRR  Pulmonary:  no respiratory distress  Abdomen:  soft, non-tender  Extremities:  no edema  Skin:  intact     Neuro Exam  Mental Status: Alert, oriented to self.  follows commands, naming and repetition normal.  Mild aphasia versus more general encephalopathy  Cranial Nerves:  visual fields intact, PERRL, EOMI with normal smooth pursuit, facial sensation intact and symmetric, hearing not formally tested but intact to conversation, palate elevation symmetric and uvula midline, no dysarthria, shoulder shrug strong bilaterally.  Baseline subtle right facial droop  Motor:  normal muscle tone and bulk, no abnormal movements, able to move all limbs spontaneously, no pronator drift,  bilateral lower extremities with slight drift but not to bed  Reflexes:  no clonus, toes down-going  Sensory:  light touch sensation intact and symmetric throughout upper and lower extremities, no extinction on double simultaneous stimulation   Coordination:  normal finger-to-nose and heel-to-shin bilaterally without dysmetria  Station/Gait:  deferred        Stroke Scales       NIHSS  1a. Level of Consciousness 0-->Alert, keenly responsive   1b. LOC Questions 0-->Answers both questions correctly   1c. LOC Commands 0-->Performs both tasks correctly   2.   Best Gaze 0-->Normal   3.   Visual 0-->No visual loss   4.   Facial Palsy 1-->Minor paralysis (flattened nasolabial fold, asymmetry on smiling)   5a. Motor Arm, Left 0-->No drift, limb holds 90 (or 45) degrees for full 10 secs   5b. Motor Arm, Right 0-->No drift, limb holds 90 (or 45) degrees for full 10 secs   6a. Motor Leg, Left 1-->Drift, leg falls by the end of the 5-sec period but does not hit bed   6b. Motor Leg, right 1-->Drift, leg falls by the end of the 5-sec period but does not hit bed   7.   Limb Ataxia 0-->Absent   8.   Sensory 0-->Normal, no sensory loss   9.   Best Language 1-->Mild-to-moderate aphasia, some obvious loss of fluency or facility of comprehension, without significant limitation on ideas expressed or form of expression. Reduction of speech and/or comprehension, however, makes conversation. . . (see row details)   10. Dysarthria 1-->Mild-to-moderate dysarthria, patient slurs at least some words and, at worst, can be understood with some difficulty   11. Extinction and Inattention  0-->No abnormality   Total 5 (05/07/25 1455)            Labs     CBC  Lab Results   Component Value Date    HGB 10.4 (L) 04/24/2025    HCT 34.6 (L) 04/24/2025    WBC 6.1 04/24/2025     04/24/2025       BMP  Lab Results   Component Value Date     04/24/2025    POTASSIUM 4.7 04/24/2025    CHLORIDE 106 04/24/2025    CO2 26 04/24/2025    BUN 34.0 (H)  04/24/2025    CR 1.8 (H) 05/07/2025     (H) 04/24/2025    VERONICA 9.2 04/24/2025       INR  INR   Date Value Ref Range Status   01/16/2025 0.96 0.85 - 1.15 Final   09/23/2024 1.02 0.85 - 1.15 Final   06/16/2024 0.98 0.85 - 1.15 Final       Data   Stroke Code Data  (for stroke code without tele)  Stroke code activated 05/07/25  1331   First stroke provider response 05/07/25  1335   Last known normal 05/06/25  1730   Time of discovery (or onset of symptoms) 05/06/25  2100   Head CT read by Stroke Neuro Provider 05/07/25  1405   Was stroke code de-escalated? Yes  05/07/25

## 2025-05-08 ENCOUNTER — APPOINTMENT (OUTPATIENT)
Dept: SPEECH THERAPY | Facility: CLINIC | Age: 65
DRG: 064 | End: 2025-05-08
Payer: COMMERCIAL

## 2025-05-08 ENCOUNTER — APPOINTMENT (OUTPATIENT)
Dept: NEUROLOGY | Facility: CLINIC | Age: 65
DRG: 064 | End: 2025-05-08
Payer: COMMERCIAL

## 2025-05-08 ENCOUNTER — APPOINTMENT (OUTPATIENT)
Dept: MRI IMAGING | Facility: CLINIC | Age: 65
End: 2025-05-08
Attending: STUDENT IN AN ORGANIZED HEALTH CARE EDUCATION/TRAINING PROGRAM
Payer: COMMERCIAL

## 2025-05-08 LAB
ANION GAP SERPL CALCULATED.3IONS-SCNC: 15 MMOL/L (ref 7–15)
BACTERIA UR CULT: NORMAL
BUN SERPL-MCNC: 34.4 MG/DL (ref 8–23)
CALCIUM SERPL-MCNC: 9 MG/DL (ref 8.8–10.4)
CHLORIDE SERPL-SCNC: 110 MMOL/L (ref 98–107)
CHOLEST SERPL-MCNC: 123 MG/DL
CREAT SERPL-MCNC: 1.6 MG/DL (ref 0.51–0.95)
EGFRCR SERPLBLD CKD-EPI 2021: 35 ML/MIN/1.73M2
ERYTHROCYTE [DISTWIDTH] IN BLOOD BY AUTOMATED COUNT: 15.3 % (ref 10–15)
GLUCOSE BLDC GLUCOMTR-MCNC: 168 MG/DL (ref 70–99)
GLUCOSE BLDC GLUCOMTR-MCNC: 197 MG/DL (ref 70–99)
GLUCOSE BLDC GLUCOMTR-MCNC: 206 MG/DL (ref 70–99)
GLUCOSE BLDC GLUCOMTR-MCNC: 309 MG/DL (ref 70–99)
GLUCOSE BLDC GLUCOMTR-MCNC: 317 MG/DL (ref 70–99)
GLUCOSE BLDC GLUCOMTR-MCNC: 394 MG/DL (ref 70–99)
GLUCOSE SERPL-MCNC: 243 MG/DL (ref 70–99)
HCO3 SERPL-SCNC: 18 MMOL/L (ref 22–29)
HCT VFR BLD AUTO: 32.4 % (ref 35–47)
HDLC SERPL-MCNC: 44 MG/DL
HGB BLD-MCNC: 9.9 G/DL (ref 11.7–15.7)
LDLC SERPL CALC-MCNC: 60 MG/DL
MCH RBC QN AUTO: 26.8 PG (ref 26.5–33)
MCHC RBC AUTO-ENTMCNC: 30.6 G/DL (ref 31.5–36.5)
MCV RBC AUTO: 88 FL (ref 78–100)
NONHDLC SERPL-MCNC: 79 MG/DL
PLATELET # BLD AUTO: 253 10E3/UL (ref 150–450)
POTASSIUM SERPL-SCNC: 4.2 MMOL/L (ref 3.4–5.3)
RADIOLOGIST FLAGS: ABNORMAL
RBC # BLD AUTO: 3.69 10E6/UL (ref 3.8–5.2)
SODIUM SERPL-SCNC: 143 MMOL/L (ref 135–145)
T4 FREE SERPL-MCNC: 1.32 NG/DL (ref 0.9–1.7)
TRIGL SERPL-MCNC: 93 MG/DL
TSH SERPL DL<=0.005 MIU/L-ACNC: 0.14 UIU/ML (ref 0.3–4.2)
WBC # BLD AUTO: 6.1 10E3/UL (ref 4–11)

## 2025-05-08 PROCEDURE — 120N000002 HC R&B MED SURG/OB UMMC

## 2025-05-08 PROCEDURE — 85041 AUTOMATED RBC COUNT: CPT

## 2025-05-08 PROCEDURE — 250N000013 HC RX MED GY IP 250 OP 250 PS 637

## 2025-05-08 PROCEDURE — 92610 EVALUATE SWALLOWING FUNCTION: CPT | Mod: GN | Performed by: SPEECH-LANGUAGE PATHOLOGIST

## 2025-05-08 PROCEDURE — 36415 COLL VENOUS BLD VENIPUNCTURE: CPT

## 2025-05-08 PROCEDURE — 82962 GLUCOSE BLOOD TEST: CPT

## 2025-05-08 PROCEDURE — 95711 VEEG 2-12 HR UNMONITORED: CPT

## 2025-05-08 PROCEDURE — 99207 PR APP CREDIT; MD BILLING SHARED VISIT: CPT | Mod: FS | Performed by: PHYSICIAN ASSISTANT

## 2025-05-08 PROCEDURE — 80048 BASIC METABOLIC PNL TOTAL CA: CPT

## 2025-05-08 PROCEDURE — 84443 ASSAY THYROID STIM HORMONE: CPT | Performed by: PHYSICIAN ASSISTANT

## 2025-05-08 PROCEDURE — 92526 ORAL FUNCTION THERAPY: CPT | Mod: GN | Performed by: SPEECH-LANGUAGE PATHOLOGIST

## 2025-05-08 PROCEDURE — A9585 GADOBUTROL INJECTION: HCPCS | Performed by: STUDENT IN AN ORGANIZED HEALTH CARE EDUCATION/TRAINING PROGRAM

## 2025-05-08 PROCEDURE — 250N000012 HC RX MED GY IP 250 OP 636 PS 637: Performed by: PHYSICIAN ASSISTANT

## 2025-05-08 PROCEDURE — 95718 EEG PHYS/QHP 2-12 HR W/VEEG: CPT | Mod: GC | Performed by: INTERNAL MEDICINE

## 2025-05-08 PROCEDURE — 250N000011 HC RX IP 250 OP 636: Performed by: STUDENT IN AN ORGANIZED HEALTH CARE EDUCATION/TRAINING PROGRAM

## 2025-05-08 PROCEDURE — 999N000127 HC STATISTIC PERIPHERAL IV START W US GUIDANCE

## 2025-05-08 PROCEDURE — 250N000011 HC RX IP 250 OP 636: Performed by: PHYSICIAN ASSISTANT

## 2025-05-08 PROCEDURE — 255N000002 HC RX 255 OP 636: Performed by: STUDENT IN AN ORGANIZED HEALTH CARE EDUCATION/TRAINING PROGRAM

## 2025-05-08 PROCEDURE — 258N000003 HC RX IP 258 OP 636: Performed by: STUDENT IN AN ORGANIZED HEALTH CARE EDUCATION/TRAINING PROGRAM

## 2025-05-08 PROCEDURE — 70553 MRI BRAIN STEM W/O & W/DYE: CPT

## 2025-05-08 PROCEDURE — 250N000011 HC RX IP 250 OP 636

## 2025-05-08 PROCEDURE — 99233 SBSQ HOSP IP/OBS HIGH 50: CPT | Mod: FS | Performed by: STUDENT IN AN ORGANIZED HEALTH CARE EDUCATION/TRAINING PROGRAM

## 2025-05-08 PROCEDURE — 250N000009 HC RX 250: Performed by: STUDENT IN AN ORGANIZED HEALTH CARE EDUCATION/TRAINING PROGRAM

## 2025-05-08 PROCEDURE — 70553 MRI BRAIN STEM W/O & W/DYE: CPT | Mod: 26 | Performed by: STUDENT IN AN ORGANIZED HEALTH CARE EDUCATION/TRAINING PROGRAM

## 2025-05-08 PROCEDURE — 84439 ASSAY OF FREE THYROXINE: CPT | Performed by: PHYSICIAN ASSISTANT

## 2025-05-08 PROCEDURE — 80061 LIPID PANEL: CPT | Performed by: PHYSICIAN ASSISTANT

## 2025-05-08 PROCEDURE — 250N000013 HC RX MED GY IP 250 OP 250 PS 637: Performed by: PHYSICIAN ASSISTANT

## 2025-05-08 PROCEDURE — 85014 HEMATOCRIT: CPT

## 2025-05-08 RX ORDER — LORAZEPAM 2 MG/ML
0.5 INJECTION INTRAMUSCULAR
Status: COMPLETED | OUTPATIENT
Start: 2025-05-08 | End: 2025-05-08

## 2025-05-08 RX ORDER — LEVOTHYROXINE SODIUM 20 UG/ML
66 INJECTION, SOLUTION INTRAVENOUS DAILY
Status: DISCONTINUED | OUTPATIENT
Start: 2025-05-08 | End: 2025-05-13

## 2025-05-08 RX ORDER — GADOBUTROL 604.72 MG/ML
7.5 INJECTION INTRAVENOUS ONCE
Status: COMPLETED | OUTPATIENT
Start: 2025-05-08 | End: 2025-05-08

## 2025-05-08 RX ORDER — LEVOTHYROXINE SODIUM 20 UG/ML
66 INJECTION, SOLUTION INTRAVENOUS DAILY
Status: DISCONTINUED | OUTPATIENT
Start: 2025-05-08 | End: 2025-05-08

## 2025-05-08 RX ORDER — ASPIRIN 300 MG/1
300 SUPPOSITORY RECTAL DAILY
Status: DISCONTINUED | OUTPATIENT
Start: 2025-05-08 | End: 2025-05-14

## 2025-05-08 RX ADMIN — LEVETIRACETAM 750 MG: 100 INJECTION, SOLUTION INTRAVENOUS at 18:32

## 2025-05-08 RX ADMIN — LORAZEPAM 0.5 MG: 2 INJECTION INTRAMUSCULAR; INTRAVENOUS at 14:12

## 2025-05-08 RX ADMIN — CEFTRIAXONE SODIUM 1 G: 1 INJECTION, POWDER, FOR SOLUTION INTRAMUSCULAR; INTRAVENOUS at 18:56

## 2025-05-08 RX ADMIN — INSULIN GLARGINE 16 UNITS: 100 INJECTION, SOLUTION SUBCUTANEOUS at 22:20

## 2025-05-08 RX ADMIN — DICLOFENAC SODIUM 2 G: 10 GEL TOPICAL at 16:21

## 2025-05-08 RX ADMIN — DICLOFENAC SODIUM 2 G: 10 GEL TOPICAL at 12:38

## 2025-05-08 RX ADMIN — DICLOFENAC SODIUM 2 G: 10 GEL TOPICAL at 20:34

## 2025-05-08 RX ADMIN — LEVETIRACETAM 750 MG: 100 INJECTION, SOLUTION INTRAVENOUS at 06:24

## 2025-05-08 RX ADMIN — DICLOFENAC SODIUM 2 G: 10 GEL TOPICAL at 08:08

## 2025-05-08 RX ADMIN — GADOBUTROL 6 ML: 604.72 INJECTION INTRAVENOUS at 15:03

## 2025-05-08 RX ADMIN — LEVOTHYROXINE SODIUM 66 MCG: 20 INJECTION, SOLUTION INTRAVENOUS at 16:20

## 2025-05-08 RX ADMIN — ASPIRIN 300 MG: 300 SUPPOSITORY RECTAL at 20:28

## 2025-05-08 ASSESSMENT — ACTIVITIES OF DAILY LIVING (ADL)
ADLS_ACUITY_SCORE: 70
ADLS_ACUITY_SCORE: 68
ADLS_ACUITY_SCORE: 68
ADLS_ACUITY_SCORE: 70
ADLS_ACUITY_SCORE: 70
ADLS_ACUITY_SCORE: 68
ADLS_ACUITY_SCORE: 70

## 2025-05-08 NOTE — PROGRESS NOTES
[facility]    Medicine Progress Note - Hospitalist Service, GOLD TEAM 6    Date of Admission:  5/7/2025    Assessment & Plan   Isabell Matthews is a 64 year old female with history of HTN, HLD, DM type I, diabetic neuropathy, CKD3b, orthostatic hypotension, seizures, vascular dementia, hypothyroidism, recurrent UTI's, and freuqent falls who was admitted to [facility] with concern for stroke after presenting with acute neurologic symptoms.    Updates for 5/8/2025:  - EEG pending  - MRI ordered, will give ativan before  - Neurology following  - Strict NPO per SLP, including meds  - Transition levothyroxine to IV route  - Increase lantus to 16 units, switch to HSSI  - CBC, BMP in AM      # Acute metabolic encephalopathy, likely multifactorial   # Hx of vascular dementia  # Hx of seizures  # Prior CVAs  Presents with new findings of slurred speech, dysphagia, decreased responsiveness, confusion. No new focal neurological deficits on exam (history of residual R facial droop). CT negative for acute infarct or hemorrhage; right frontotemporal encephalomalacia; multifocal stenosis involving left RADHA (A2) and right MCA which has progressed since 1/16 study. Stroke Team consulted. Presentation likely recrudescence in setting of acute encephalopathy, although cannot completely r/o stroke vs seizure. MRI pending. No significant electrolyte abnormalities. Most likely cause of patient encephalopathy is UTI, which was recently diagnosed and has only completed ~1 day of therapy. Also noted to have severe hyperglycemia, which could be contributing. No suspicious meds  - Neuro stroke team following  - Obtain MRI, can give ativan if needed for restlessness  - EEG pending  - IV Keppra 750 mg BID  - Q4h neuro checks  - Treat UTI as below     # UTI, recurrent  Hx recurrent UTI. Urine culture from 5/1 shows pan-susceptible E.Coli. Patient afebrile, WBC wnl, no other obvious infectious signs. Received 1  day of cephalexin prior to presenting to ED. Started on ceftriaxone in the ED. Per daughter, does have worsening 'stroke-like' symptoms during her UTI's in the past. Will continue abx.  - IV ceftriaxone daily  - Urine cultures pending  - Repeat BMP, CBC in AM     # Dysphagia  Patient failed initial swallow study. Likely in the setting of acute encephalopathy. SLP consulted and noted patient held food in mouth without any attempt to initiate swallow. Per discussion with daughter, similar symptoms in past when she had hemorrhagic stroke. Likely recrudescence.  - Strict NPO per SLP, who will follow  - Hold PO meds, convert to IV as able     #Type 1 DM  A1c of 8.8% on 3/2025. BS in 300-400 this admission. NPO due to dysphagia. Hx of labile sugars and hypoglycemia in setting of infection. Home regimen: Lantus 18u at bedtime.   - Increase Lantus to 16U at bedtime  - HSSI  - BG checks q4h  - Hypoglycemia protocol      #Hypothyroidism:  most recent TSH 0.99.   - Hold synthroid, start IV levothyroxine 66mg daily  - Recheck TSH     #CKD3b  Baseline Cr of 1.4-1.6. Cr on admission of 1.5.   - BMP daily     #HTN  #HLD  -180 on admission.  - Hold carvedilol  - Hold atorvastatin, aspirin  - IV hydralazine PRN for SBP>180     # Chronic low back pain  - Diclofenac gel  - Hold duloxetine, gabapentin     # Seasonal allergies  - Hold loratidine              Diet: NPO for Medical/Clinical Reasons Except for: No Exceptions    DVT Prophylaxis: Pneumatic Compression Devices  Parker Catheter: Not present  Lines: None     Cardiac Monitoring: None  Code Status: No CPR- Do NOT Intubate      Clinically Significant Risk Factors Present on Admission          # Hyperchloremia: Highest Cl = 110 mmol/L in last 2 days, will monitor as appropriate            # Drug Induced Platelet Defect: home medication list includes an antiplatelet medication   # Hypertension: Noted on problem list     # Dementia: noted on problem list  # Anemia: based on hgb  <11           # Financial/Environmental Concerns:           Social Drivers of Health    Food Insecurity: Unknown (3/25/2025)    Food Insecurity     Within the past 12 months, did you worry that your food would run out before you got money to buy more?: Patient unable to answer     Within the past 12 months, did the food you bought just not last and you didn t have money to get more?: Patient unable to answer   Depression: At risk (4/2/2025)    PHQ-2     PHQ-2 Score: 4   Housing Stability: Unknown (3/25/2025)    Housing Stability     Do you have housing? : Patient unable to answer     Are you worried about losing your housing?: Patient unable to answer   Tobacco Use: Medium Risk (4/24/2025)    Patient History     Smoking Tobacco Use: Former     Smokeless Tobacco Use: Never   Financial Resource Strain: Unknown (3/25/2025)    Financial Resource Strain     Within the past 12 months, have you or your family members you live with been unable to get utilities (heat, electricity) when it was really needed?: Patient unable to answer   Transportation Needs: Unknown (3/25/2025)    Transportation Needs     Within the past 12 months, has lack of transportation kept you from medical appointments, getting your medicines, non-medical meetings or appointments, work, or from getting things that you need?: Patient unable to answer   Interpersonal Safety: Unknown (3/25/2025)    Interpersonal Safety     Do you feel physically and emotionally safe where you currently live?: Patient unable to answer     Within the past 12 months, have you been hit, slapped, kicked or otherwise physically hurt by someone?: Patient unable to answer     Within the past 12 months, have you been humiliated or emotionally abused in other ways by your partner or ex-partner?: Patient unable to answer          Disposition Plan     Medically Ready for Discharge: Anticipated in 2-4 Days           The patient's care was discussed with the Attending Physician,   Becky and Patient's Family.    Javier Johnson PA-C  Hospitalist Service, GOLD TEAM 6  [facility]  Securely message with [employer] (more info)  Text page via [employer] Paging/Directory   See signed in provider for up to date coverage information  ______________________________________________________________________    Interval History   Patient nonverbal but follows most commands. Seems very weak and/or fatigued.     Physical Exam   Vital Signs: Temp: 97.6  F (36.4  C) Temp src: Oral BP: (!) 151/65 Pulse: 100   Resp: 16 SpO2: 99 % O2 Device: None (Room air)    Weight: 0 lbs 0 oz    General Appearance:  Awake. Alert. Non-verbal. Appears ill.  HEENT:  Tongue midline. Difficult to assess EOM, but appear mostly intact. PERRL.   Respiratory:  Normal effort. CTAB. No wheezing, rhonchi, rales.   Cardiovascular:  RRR. S1/S2. No murmurs.   GI:  Soft, non-distended, non-tender, +BS.   Extremity:  No pitting edema.   Skin:  No visible rash.   Neuro:  Equal , mild generalized weakness. No obvious facial droop. Exam very limited given patient status.     Medical Decision Making       50 MINUTES SPENT BY ME on the date of service doing chart review, history, exam, documentation & further activities per the note.      Data     I have personally reviewed the following data over the past 24 hrs:    6.1  \   9.9 (L)   / 253     143 110 (H) 34.4 (H) /  309 (H)   4.2 18 (L) 1.60 (H) \     Trop: N/A BNP: N/A     TSH: 0.14 (L) T4: 1.32 A1C: N/A       Imaging results reviewed over the past 24 hrs:   Recent Results (from the past 24 hours)   MR Brain w/o & w Contrast   Result Value    Radiologist flags (Urgent)     Acute strokes in the right corona radiata, precentral    Narrative    EXAM: MR BRAIN W/O & W CONTRAST  5/8/2025 3:02 PM     HISTORY: acute encpehalopathy, speech difficulty       COMPARISON: CT 5/7/2025    TECHNIQUE: Sagittal T1-weighted, coronal T2-weighted, axial T2 FLAIR,  axial  susceptibility weighted, and axial diffusion-weighted with ADC  map images of the brain were obtained without intravenous contrast.  After the administration of intravenous contrast axial and coronal  fat-suppressed T1-weighted weighted images were obtained    CONTRAST: 6mL Gadavist.    FINDINGS:  Focal areas of restricted diffusion within the right corona radiata,  precentral gyrus, and right operculum. There is associated mild  T2/FLAIR signal. Chronic infarct in the right basal ganglia and left  insula with associated hemosiderin staining. Mild T2 hyperintensity is  present within the periventricular and subcortical white matter, as  well as the chuck,, likely related to chronic small vessel ischemic  disease given the patient's age. Moderate generalized cerebral and  cerebellar parenchymal volume loss. Scattered chronic microhemorrhages  within the bilateral basal ganglia, chuck, and right frontal lobe. No  abnormal enhancement on the postcontrast images.    There is no mass effect, midline shift, or intracranial hemorrhage.  The ventricles are proportionate to the cerebral sulci. Major  intracranial vascular structures are within normal limits.    No suspicious abnormality of the skull marrow signal. Clear paranasal  sinuses. Mastoid air cells are clear. No focal abnormality of the  pituitary gland, sella, skull base and upper cervical spinal  structures on sagittal images. The orbits are normal. Unchanged T2  hyperintense lesion posterior to the right external auditory canal.      Impression    IMPRESSION:  1. Acute infarcts within the right corona radiata, precentral gyrus,  and operculum.  2. Chronic infarcts in the right basal ganglia and left insula    [Urgent Result: Acute strokes in the right corona radiata, precentral  gyrus, and operculum]    Finding was identified on 5/8/2025 3:12 PM.     Dr. Adam was contacted by Dr. Haas at 5/8/2025 3:24 PM and  verbalized understanding of the urgent finding.      I have personally reviewed the examination and initial interpretation  and I agree with the findings.    NAWAF CRAWFORD MD         SYSTEM ID:  [id number]

## 2025-05-08 NOTE — PLAN OF CARE
Goal Outcome Evaluation:      Plan of Care Reviewed With: patient    Overall Patient Progress: no changeOverall Patient Progress: no change    Outcome Evaluation: patient alert with confusion and slurred speech    Neuro: Patient alert with slurred speech and weakness. Had eeg, also taken for MRI, ativan given prior.  Cardiac: WNL   Respiratory:Denies SOB, sats wnl  GI/:External catheter applied, voiding spontaneously, assisted with renée cares. Pt seen by speech, failed swallow test per speech.  Diet/appetite:NPO  Activity:In bed  Pain:Denies pain  Skin:Ecchymosis in the back  Lines:PIV    BP (!) 154/68 (BP Location: Right arm)   Pulse 99   Temp 98.1  F (36.7  C) (Oral)   Resp 16   SpO2 99%

## 2025-05-08 NOTE — PLAN OF CARE
Nursing note  [id number]      Plan of Care Reviewed With: patient    Overall Patient Progress: no change    Outcome Evaluation:   Continued HTN and ST otherwise AVSS on RA.  Neuro checks now q4, L sided deficits noted. 1L bolus given. Tolerating int. ABXs. Purewick remains in place, no BM. MRI unable to be completed x2 d/t pt unable to be still, provider aware and adding pt to 5/8 schedule. SLP consult added.       Plan: SLP @ 0900 5/8, MRI 5/8    Ramila Tobin RN

## 2025-05-08 NOTE — PROGRESS NOTES
"CLINICAL SWALLOW EVALUATION     05/08/25 [id number]   Appointment Info   Signing Clinician's Name / Credentials (SLP) [facility]   General Information   Onset of Illness/Injury or Date of Surgery 05/07/25   Referring Physician Paul Medeiros MD   Patient/Family Therapy Goal Statement (SLP) Patient did not state.   Pertinent History of Current Problem Per neurology note: \"Isabell Matthews is a 64 year old woman with historyof HTN, HLD, CKD IIIb, T1DM c/b neuropathy, ischemic and hemorrhagic strokes (right basal ganglia ischemic infarct in 2018, ICAD left basal ganglia hemorrhagic stroke 2021, right centrum semiovale & right chuck 2023) with residual R facial droop and vascular dementia, seizures 2/2 DKA, and recurrent UTI for whom a stroke code was called today for acute onset speech changes and worsening of baseline cognition. Her exam today is largely nonfocal, and less concerning for a significant acute stroke.  Reassuringly, there is no recurrence of hemorrhagic stroke on her CT. The patient was recently diagnosed with a UTI with likely steadier decompensation since Thursday or Friday of last week rather than just in the past day, which could to recrudescence of prior stroke symptoms like speech changes or facial droop.  In addition, the patient does have a history of seizures which is an alternate possibility for her presentation given that her extensive encephalomalacia and ongoing UTI can both lower her seizure threshold.\" MRI and EEG pending. Clinical swallow evaluation completed per MD order.   General Observations Patient awake, gives eye contact, cooperative but slow task initiation, attempts to verbalize a few instances, but not intelligible.   Type of Evaluation   Type of Evaluation Swallow Evaluation   Oral Motor   Oral Musculature anomalies present   Structural Abnormalities none present   Mucosal Quality dry;sticky   Dentition (Oral Motor)   Dentition (Oral Motor) some missing teeth   Facial " Symmetry (Oral Motor)   Facial Symmetry (Oral Motor) left side impairment   Left Side Facial Asymmetry minimal impairment;moderate impairment   Lip Function (Oral Motor)   Lip Range of Motion (Oral Motor) retraction impairment;protrusion impairment   Protrusion, Lip Range of Motion left side;minimal impairment   Retraction, Lip Range of Motion left side;moderate impairment   Lip Strength (Oral Motor) bilateral;mild impairment   Tongue Function (Oral Motor)   Tongue ROM (Oral Motor) protrusion is impaired;lateralization is impaired   Protrusion, Tongue ROM Impairment (Oral Motor) bilateral;moderate impairment;severe impairment   Lateralization, Tongue ROM Impairment (Oral Motor) bilateral;moderate impairment;severe impairment   Tongue Strength (Oral Motor) strength decreased;bilateral   Tongue Coordination/Speed (Oral Motor) reduced rate   Jaw Function (Oral Motor)   Jaw Function (Oral Motor) range of motion impairment   Jaw Range of Motion Impairment bilateral;minimal impairment;moderate impairment   Cough/Swallow/Gag Reflex (Oral Motor)   Soft Palate/Velum (Oral Motor) unable/difficult to assess   Volitional Throat Clear/Cough (Oral Motor) reduced strength   Volitional Swallow (Oral Motor) unable to initiate   Vocal Quality/Secretion Management (Oral Motor)   Vocal Quality (Oral Motor) breathy;hypophonic   General Swallowing Observations   Current Diet/Method of Nutritional Intake (General Swallowing Observations, NIS) NPO   Respiratory Support room air   Past History of Dysphagia Patient familiar to SLP service for swallowing. Most recent clinical swallow evaluation completed 8/26/2024 with recommendation for continued soft/bite size diet (IDDSI 6) and thin liquid (IDDSI 0). Patient not able to describe swallow history since that time. Family not present. Per chart review, patient has not had PO including pills since 5/6/2025.   Comment, General Swallowing Observations Patient positioned upright in bed with assist  of 2. Provided oral cares with oral swabs to remove mucous/debris from oral cavity (lateral sulci especially on left side) prior to PO assessment.   Swallowing Evaluation Clinical swallow evaluation   Clinical Swallow Evaluation   Feeding Assistance frequent cues/help required   Clinical Swallow Evaluation Textures Trialed thin liquids;mildly thick liquids;pureed   Clinical Swallow Eval: Thin Liquid Texture Trial   Mode of Presentation, Thin Liquids spoon;self-fed;fed by clinician  (hand over hand assist)   Volume of Liquid or Food Presented 2 small sips   Oral Phase of Swallow delayed AP movement;residue in oral cavity  (no attempt to swallow)   Pharyngeal Phase of Swallow   (no swallow observed)   Diagnostic Statement Liquid held in oral cavity despite max cues. Eventually removed manually.   Clinical Swallow Eval: Mildly Thick Liquids   Mode of Presentation spoon;cup;fed by clinician;self-fed  (hand over hand assist)   Volume Presented 2 sips   Oral Phase delayed AP movement;residue in oral cavity  (no attempt to swallow)   Pharyngeal Phase   (no swallow observed)   Diagnostic Statement Liquid held in oral cavity despite max cues. Eventually removed manually.   Clinical Swallow Evaluation: Puree Solid Texture Trial   Mode of Presentation, Puree spoon;self-fed   Volume of Puree Presented 1 small bite pudding   Oral Phase, Puree delayed AP movement;residue in oral cavity  (no attempt to swallow)   Pharyngeal Phase, Puree   (no swallow observed)   Diagnostic Statement Pudding held anterior oral cavity/tongue despite max cues. Removed manually.   Swallowing Recommendations   Diet Consistency Recommendations NPO;consider alternative means of nutrition   Medication Administration Recommendations, Swallowing (SLP) via non-oral route   Instrumental Assessment Recommendations instrumental evaluation not recommended at this time   General Therapy Interventions   Planned Therapy Interventions Dysphagia Treatment   Dysphagia  treatment Oropharyngeal exercise training;Modified diet education;Instruction of safe swallow strategies   Clinical Impression   Criteria for Skilled Therapeutic Interventions Met (SLP Eval) Yes, treatment indicated   SLP Diagnosis Dysphagia   Risks & Benefits of therapy have been explained evaluation/treatment results reviewed;care plan/treatment goals reviewed;risks/benefits reviewed;current/potential barriers reviewed;participants included;patient  (patient not able to voice agreement)   Clinical Impression Comments Clinical swallow evaluation completed per MD order in setting of AMS and left facial droop. Patient has hx of dysphagia and most recent SLP note wtih recommendation for soft/bite size diet and thin liquids. Patient has not had any PO, including meds, since 5/6/2025 per chart. Patient awake, cooperative, with slow task initiation and assist needed with feeding. Oral mech exam remarkable for dry oral mucosa, mucous/debris in oral cavity (especially left side), left facial and oral labial asymmetry and reduced strength and ROM, reduced lingual ROM and strength. reduced jaw ROM, weak cough effort/strength, weak, hoarse, hypophonic vocal quality and some missing teeth. Patient assessed wtih thin liquid, mildly thick liquid, puree. Patient assisted with feeding self, however, made no attempt to swallow despite max cues. Liquid or puree remained in oral cavity and eventually drooled from mouth or removed manually. No swallows observed. Recommend continue NPO at this time and non-oral route for medications. Please complete frequent oral cares. SLP to follow for PO readiness.   SLP Total Evaluation Time   Eval: oral/pharyngeal swallow function, clinical swallow Minutes ([id number]) 10   SLP Goals   Therapy Frequency (SLP Eval) daily   SLP Predicted Duration/Target Date for Goal Attainment 05/29/25   SLP Goals Swallow;SLP Goal 1           Interventions   Interventions Quick Adds Swallowing Dysfunction   Swallowing  Intervention               SLP Discharge Planning   SLP Plan PO readiness   SLP Discharge Recommendation Transitional Care Facility   SLP Rationale for DC Rec Below baseline for swallow function.   SLP Brief overview of current status  Cliical swallow evaluation completed. Patient not able to initiate swallows and all PO trials drooled or removed manually. Recommend continue NPO at this time and non-oral route for medications. Please complete frequent oral cares. SLP to follow for PO readiness.   SLP Time and Intention

## 2025-05-08 NOTE — H&P
[facility]    History and Physical - Medicine Service, INDER TEAM        Date of Admission:  5/7/2025    Assessment & Plan      Isabell Matthews is a 64 year old female with a past medical history of multiple CVAs w/ residual right facial droop (most recent 6/2024), HTN, HLD, CKD IIIb, DM1 c/b neuropathy, orthostatic hypotension, seizures, ischemic vascular dementia, recurrent UTI, and frequent falls presenting with poor oral intake, soft voice, now with findings concerning for UTI.    # Acute on chronic encephalopathy, likely multifactorial   # Hx of vascular dementia  # Hx of seizures  # Prior CVAs  Presents with new findings of soft voice, poor oral intake, decreased responsiveness, confusion. Per physical exam, no new focal neurological deficits. Does have history of R facial droop, but this is not significantly prominent on exam. CT shows no acute signs of hemorrhage or stroke, although MRI is pending. No significant electrolyte abnormalities. Most likely cause of patient encephalopathy is UTI, which was recently diagnosed and she had just started oral antibiotics for prior to the presentation of symptoms. Does have a history of seizures, which is possibly contributing to patients symptoms as well; will obtain EEG. Likely multifactorial including dementia, infection +/- seizure history.  - Neuro stroke team following  - Obtain MRI, can give ativan if needed for restlessness  - EEG ordered  - IV Keppra 750 mg BID  - Q4h neuro checks  - Treat UTI as listed below    # UTI, recurrent  Has had recurrent UTI in the past. Urine culture from 5/1 shows pan-susceptible E.Coli. Patient afebrile, WBC wnl, no other obvious infectious signs; pyelonephritis seems less likely. Received 1 day of cephalexin prior to presenting to ED. Started on ceftriaxone in the ED. Per daughter, does have worsening 'stroke-like' symptoms during her UTI's in the past. Will continue abx.  - IV  ceftriaxone daily  - Urine cultures pending  - Repeat BMP, CBC in AM    # Dysphagia  Patient failed initial swallow study. Likely in the setting of acute encephalopathy. Will hold oral meds, give essential meds IV.  - SLP consult to assess swallow  - Strict NPO including meds    #Type 1 DM  A1c of 8.8% on 3/2025. BS in 300s on admission. Has not eaten for 1.5 prior to admission and is currently NPO due to dysphagia.  Home regimen: Lantus 18u at bedtime  - Lantus 10U at bedtime  - Medium dose sliding scale insulin  - BG checks q4h    #Hypothyroidism  - Hold synthroid  - Recheck TSH     #CKD3b  Baseline Cr of 1.5. Cr on admission of 1.5, has remained stable.      #HTN  #HLD  #Hx CVA  -180 on admission.  - Hold carvedilol  - Hold atorvastatin, aspirin  - IV hydralazine PRN for SBP>180    # Chronic low back pain  - Diclofenac gel  - Hold duloxetine, gabapentin    # Seasonal allergies  - Hold loratidine       Diet: NPO for Medical/Clinical Reasons Except for: No Exceptions    DVT Prophylaxis: Pneumatic Compression Devices, can start heparin after MRI stroke rule-out  Parker Catheter: Not present  Code Status: No CPR- Do NOT Intubate      The patient's care was discussed with the Attending Physician, Dr. Ledezma.    Paul Medeiros MD  Medicine Service, [facility]  Securely message with [employer] (more info)  Text page via [facility] Paging/Directory   See signed in provider for up to date coverage information  ______________________________________________________________________    History of Present Illness   Isabell Matthews is a 64 year old female with a past medical history of multiple CVAs w/ residual right facial droop (most recent 6/2024), HTN, HLD, CKD IIIb, DM1 c/b neuropathy, orthostatic hypotension, seizures, ischemic vascular dementia, recurrent UTI, and frequent falls presenting with poor oral intake, soft voice, now with findings concerning for  UTI.    Patient was brought to ED by daughter after poor oral intake and acute presentation of soft voice and confusion. These symptoms were noted when driving to [place] in [place], after which daughter turned around and brought patient to [facility] ED. Daughter states that patient has had recurrent UTI in the past that has presented with abnormal stroke-like symptoms, but this seems worse. Has not eaten or drank anything since early yesterday. Did not take any medications today or yesterday. Has also had decreased UOP. Had a positive urine culture recently and was started on cephalexin for which she has been treated for 1 day. Patient is able to follow commands but speaks in a soft voice and does not answer questions at bedside.    In the ED, patient was hypertensive, but otherwise HD stable and afebrile. WBC normal. Cr 1.5, stable at baseline. UA was obtained showing 49 WBC. Recent urine culture on 5/1 showed pan sensitive E.coli. Patient was started on ceftriaxone. CT head was obtained which showed no intracranial bleed or ischemia. Code stroke was called, neuro recommended MRI to rule out stroke and EEG to evaluate for seizure. MRI brain was attempted but was stopped early due to excessive movement.     Past Medical History    Past Medical History:   Diagnosis Date    Chronic pain     Coronary atherosclerosis 06/14/2016    Essential hypertension     Ex-cigarette smoker     quit 7/2018 over 35 years    Ex-cigarette smoker     Hyperlipidemia     Hypothyroid     Seizures (H)     Stroke (H)     Vascular dementia (H)        Past Surgical History   Past Surgical History:   Procedure Laterality Date    athroplasty hip Bilateral     2003, 2006    CATARACT IOL, RT/LT      OTHER SURGICAL HISTORY      athroplasty hip    PICC DOUBLE LUMEN PLACEMENT Right 06/11/2023    right basilic 5 fr dl power picc 39 cm    STENT         Prior to Admission Medications   Prior to Admission Medications   Prescriptions Last Dose  Informant Patient Reported? Taking?   Continuous Glucose Sensor (FREESTYLE MARIA FERNANDA 2 PLUS SENSOR) MISC   No No   Sig: Change every 15 days.   Continuous Glucose Sensor (FREESTYLE MARIA FERNANDA 2 SENSOR) MISC   No No   Sig: Change every 14 days.   DULoxetine (CYMBALTA) 20 MG capsule   No No   Sig: TAKE 1 CAPSULE(20 MG) BY MOUTH DAILY   Glucagon (GVOKE HYPOPEN) 1 MG/0.2ML pen   No No   Sig: Inject the contents of 1 device under the skin into lower abdomen, outer thigh, or outer upper arm as needed for hypoglycemia. If no response after 15 minutes, additional 1 mg dose from a new device may be injected while waiting for emergency assistance.   Lidocaine (LIDOCARE) 4 % Patch   No No   Sig: Place 1 patch over 12 hours onto the skin every 24 hours. To prevent lidocaine toxicity, patient should be patch free for 12 hrs daily.   PENTIPS 32G X 4 MM miscellaneous   Yes No   Sig: Inject 1 each subcutaneously daily. Use 4 pen needles daily or as directed.   SYNTHROID 88 MCG tablet   No No   Sig: Take 1 tablet (88 mcg) by mouth every morning (before breakfast).   acetaminophen (TYLENOL) 325 MG tablet   No No   Sig: Take 3 tablets (975 mg) by mouth every 8 hours.   aspirin (ASA) 81 MG chewable tablet   No No   Sig: Take 1 tablet (81 mg) by mouth daily   atorvastatin (LIPITOR) 40 MG tablet   No No   Sig: Take 1 tablet (40 mg) by mouth daily.   blood glucose (NO BRAND SPECIFIED) test strip   No No   Sig: Use to test blood sugar 4 times daily or as directed.   blood glucose monitoring (ONE TOUCH ULTRA MINI) meter device kit   No No   Sig: Use to test blood sugar 4 times daily or as directed.   carvedilol (COREG) 6.25 MG tablet   No No   Sig: Take 1 tablet (6.25 mg) by mouth 2 times daily (with meals).   cephALEXin (KEFLEX) 500 MG capsule   No No   Sig: Take 1 capsule (500 mg) by mouth 3 times daily.   chlorhexidine (PERIDEX) 0.12 % solution   No No   Sig: Take 30 mLs by mouth daily. Swish and spit 30 mL once daily for 14 days.Swish and spit  30 mL once daily for 14 days.   cyanocobalamin (VITAMIN B-12) 1000 MCG tablet   No No   Sig: Take 1 tablet (1,000 mcg) by mouth daily   diclofenac (VOLTAREN) 1 % topical gel   No No   Sig: Apply 2 g topically 4 times daily.   gabapentin (NEURONTIN) 100 MG capsule   No No   Sig: Take 1 capsule (100 mg) by mouth at bedtime.   hydrALAZINE (APRESOLINE) 10 MG tablet   No No   Sig: Take one po bid prn if systolic blood pressure over 160   insulin aspart (NOVOLOG FLEXPEN) 100 UNIT/ML pen   No No   Sig: Inject 2 units with meals plus correction scale additional three times daily before meals  as follows  Pre-Meal  - 200 give additional 1 unit.  For Pre-Meal  -250 give 2 additional units.  For Pre-Meal -300 give 3 additional units.  For Pre-Meal -350 give 4 additional units.  For Pre-Meal -400 give 5 additional units Max 25 units daily   Patient taking differently: Inject 2 units with meals plus correction scale additional three times daily before meals  as follows  Pre-Meal  - 200 give additional 1 unit.  For Pre-Meal  -250 give 2 additional units.  For Pre-Meal -300 give 3 additional units.  For Pre-Meal -350 give 4 additional units.  For Pre-Meal -400 give 5 additional units Max 20 units daily.   insulin aspart (NOVOLOG FLEXPEN) 100 UNIT/ML pen   No No   Sig: Novolog Flexpen: 1 unit per 15 grams of carbohydrates with meals   insulin glargine (LANTUS PEN) 100 UNIT/ML pen   No No   Sig: Inject 18 Units subcutaneously at bedtime.   levETIRAcetam (KEPPRA) 250 MG tablet   No No   Sig: Take 1 tablet (250 mg) by mouth every morning. (in addition to the separate prescription for 500mg, to be taken at night)   levETIRAcetam (KEPPRA) 500 MG tablet   No No   Sig: Take 1 tablet (500 mg) by mouth every evening. (in addition to the separate prescription for 250 mg taken in the morning)   levothyroxine (SYNTHROID/LEVOTHROID) 100 MCG tablet   No No   Sig: Take 1 tablet (100 mcg)  by mouth every morning (before breakfast).   levothyroxine (SYNTHROID/LEVOTHROID) 100 MCG tablet   No No   Sig: Take 1 tablet (100 mcg) by mouth every morning (before breakfast). RAMÍREZ - brand medically necessary because of reaction to excipient in generic   loratadine (CLARITIN) 10 MG tablet   No No   Sig: Take 1 tablet (10 mg) by mouth daily.   meclizine (ANTIVERT) 12.5 MG tablet   No No   Sig: Take 1 tablet (12.5 mg) by mouth 3 times daily as needed for dizziness.   methocarbamol (ROBAXIN) 500 MG tablet   No No   Sig: Take 1 tablet (500 mg) by mouth daily as needed for muscle spasms.   zinc oxide (DESITIN) 20 % external ointment   No No   Sig: Apply topically as needed for dry skin or irritation      Facility-Administered Medications: None      Physical Exam   Vital Signs: Temp: 98.2  F (36.8  C) Temp src: Oral BP: (!) 169/73 Pulse: 101   Resp: 18 SpO2: 98 % O2 Device: None (Room air)    Weight: 0 lbs 0 oz  Constitutional:       General: She is not in acute distress.     Appearance: Normal appearance. She is not diaphoretic.   Cardiovascular:      Rate and Rhythm: Normal rate.      Heart sounds: Normal heart sounds.   Pulmonary:      Effort: No respiratory distress.      Breath sounds: Normal breath sounds. No stridor. No wheezing, rhonchi or rales.   Chest:      Chest wall: No tenderness.   Abdominal:      General: Abdomen is flat. There is no distension.      Palpations: There is no mass.      Tenderness: There is no abdominal tenderness. There is no guarding or rebound.      Hernia: No hernia is present.   Musculoskeletal:      Cervical back: Neck supple.   Skin:     General: Skin is warm.      Findings: No rash.   Neurological:      Mental Status: She is alert.      Cranial Nerves: No cranial nerve deficit.      Sensory: No sensory deficit.      Motor: No weakness.      Comments: no facial droop visualized

## 2025-05-08 NOTE — PROGRESS NOTES
[facility]  Patient is currently open to home care services with [facility]. The patient has RN  PT OT  [facility]   and team have been notified of patient admission. The end of this  episode is 5/31/25.   [facility] liaison will continue to follow patient during stay. If  appropriate provide orders to resume home care at time of discharge.

## 2025-05-09 ENCOUNTER — APPOINTMENT (OUTPATIENT)
Dept: GENERAL RADIOLOGY | Facility: CLINIC | Age: 65
End: 2025-05-09
Payer: COMMERCIAL

## 2025-05-09 ENCOUNTER — APPOINTMENT (OUTPATIENT)
Dept: CARDIOLOGY | Facility: CLINIC | Age: 65
End: 2025-05-09
Attending: PHYSICIAN ASSISTANT
Payer: COMMERCIAL

## 2025-05-09 ENCOUNTER — APPOINTMENT (OUTPATIENT)
Dept: GENERAL RADIOLOGY | Facility: CLINIC | Age: 65
DRG: 064 | End: 2025-05-09
Attending: PHYSICIAN ASSISTANT
Payer: COMMERCIAL

## 2025-05-09 ENCOUNTER — APPOINTMENT (OUTPATIENT)
Dept: GENERAL RADIOLOGY | Facility: CLINIC | Age: 65
End: 2025-05-09
Attending: PHYSICIAN ASSISTANT
Payer: COMMERCIAL

## 2025-05-09 ENCOUNTER — APPOINTMENT (OUTPATIENT)
Dept: SPEECH THERAPY | Facility: CLINIC | Age: 65
DRG: 064 | End: 2025-05-09
Payer: COMMERCIAL

## 2025-05-09 VITALS
HEART RATE: 91 BPM | OXYGEN SATURATION: 100 % | SYSTOLIC BLOOD PRESSURE: 150 MMHG | TEMPERATURE: 98.3 F | DIASTOLIC BLOOD PRESSURE: 63 MMHG | RESPIRATION RATE: 18 BRPM

## 2025-05-09 LAB
ANION GAP SERPL CALCULATED.3IONS-SCNC: 12 MMOL/L (ref 7–15)
BACTERIA UR CULT: NORMAL
BASOPHILS # BLD AUTO: 0 10E3/UL (ref 0–0.2)
BASOPHILS NFR BLD AUTO: 1 %
BUN SERPL-MCNC: 30.7 MG/DL (ref 8–23)
CALCIUM SERPL-MCNC: 8.9 MG/DL (ref 8.8–10.4)
CHLORIDE SERPL-SCNC: 115 MMOL/L (ref 98–107)
CREAT SERPL-MCNC: 1.68 MG/DL (ref 0.51–0.95)
EGFRCR SERPLBLD CKD-EPI 2021: 33 ML/MIN/1.73M2
EOSINOPHIL # BLD AUTO: 0.1 10E3/UL (ref 0–0.7)
EOSINOPHIL NFR BLD AUTO: 1 %
ERYTHROCYTE [DISTWIDTH] IN BLOOD BY AUTOMATED COUNT: 15.7 % (ref 10–15)
GLUCOSE BLDC GLUCOMTR-MCNC: 102 MG/DL (ref 70–99)
GLUCOSE BLDC GLUCOMTR-MCNC: 138 MG/DL (ref 70–99)
GLUCOSE BLDC GLUCOMTR-MCNC: 154 MG/DL (ref 70–99)
GLUCOSE BLDC GLUCOMTR-MCNC: 192 MG/DL (ref 70–99)
GLUCOSE BLDC GLUCOMTR-MCNC: 223 MG/DL (ref 70–99)
GLUCOSE BLDC GLUCOMTR-MCNC: 81 MG/DL (ref 70–99)
GLUCOSE BLDC GLUCOMTR-MCNC: 83 MG/DL (ref 70–99)
GLUCOSE SERPL-MCNC: 77 MG/DL (ref 70–99)
HCO3 SERPL-SCNC: 20 MMOL/L (ref 22–29)
HCT VFR BLD AUTO: 31 % (ref 35–47)
HGB BLD-MCNC: 9.9 G/DL (ref 11.7–15.7)
IMM GRANULOCYTES # BLD: 0 10E3/UL
IMM GRANULOCYTES NFR BLD: 0 %
LVEF ECHO: NORMAL
LYMPHOCYTES # BLD AUTO: 1.5 10E3/UL (ref 0.8–5.3)
LYMPHOCYTES NFR BLD AUTO: 18 %
MCH RBC QN AUTO: 27.3 PG (ref 26.5–33)
MCHC RBC AUTO-ENTMCNC: 31.9 G/DL (ref 31.5–36.5)
MCV RBC AUTO: 85 FL (ref 78–100)
MONOCYTES # BLD AUTO: 0.6 10E3/UL (ref 0–1.3)
MONOCYTES NFR BLD AUTO: 7 %
NEUTROPHILS # BLD AUTO: 6.2 10E3/UL (ref 1.6–8.3)
NEUTROPHILS NFR BLD AUTO: 74 %
NRBC # BLD AUTO: 0 10E3/UL
NRBC BLD AUTO-RTO: 0 /100
PLATELET # BLD AUTO: 236 10E3/UL (ref 150–450)
POTASSIUM SERPL-SCNC: 3.9 MMOL/L (ref 3.4–5.3)
RADIOLOGIST FLAGS: ABNORMAL
RBC # BLD AUTO: 3.63 10E6/UL (ref 3.8–5.2)
SODIUM SERPL-SCNC: 139 MMOL/L (ref 135–145)
SODIUM SERPL-SCNC: 147 MMOL/L (ref 135–145)
WBC # BLD AUTO: 8.4 10E3/UL (ref 4–11)

## 2025-05-09 PROCEDURE — 82962 GLUCOSE BLOOD TEST: CPT

## 2025-05-09 PROCEDURE — 999N000065 XR ABDOMEN 1 VIEW

## 2025-05-09 PROCEDURE — 999N000065 XR ABDOMEN PORT 1 VIEW

## 2025-05-09 PROCEDURE — 250N000011 HC RX IP 250 OP 636: Mod: JZ | Performed by: STUDENT IN AN ORGANIZED HEALTH CARE EDUCATION/TRAINING PROGRAM

## 2025-05-09 PROCEDURE — 85041 AUTOMATED RBC COUNT: CPT | Performed by: PHYSICIAN ASSISTANT

## 2025-05-09 PROCEDURE — 99207 PR SC NO CHARGE VISIT/PATIENT NOT SEEN: CPT

## 2025-05-09 PROCEDURE — 74018 RADEX ABDOMEN 1 VIEW: CPT | Mod: 26 | Performed by: RADIOLOGY

## 2025-05-09 PROCEDURE — 250N000012 HC RX MED GY IP 250 OP 636 PS 637

## 2025-05-09 PROCEDURE — 99233 SBSQ HOSP IP/OBS HIGH 50: CPT | Mod: FS | Performed by: STUDENT IN AN ORGANIZED HEALTH CARE EDUCATION/TRAINING PROGRAM

## 2025-05-09 PROCEDURE — 93306 TTE W/DOPPLER COMPLETE: CPT | Mod: 26 | Performed by: INTERNAL MEDICINE

## 2025-05-09 PROCEDURE — 120N000002 HC R&B MED SURG/OB UMMC

## 2025-05-09 PROCEDURE — 255N000002 HC RX 255 OP 636: Performed by: STUDENT IN AN ORGANIZED HEALTH CARE EDUCATION/TRAINING PROGRAM

## 2025-05-09 PROCEDURE — C8929 TTE W OR WO FOL WCON,DOPPLER: HCPCS

## 2025-05-09 PROCEDURE — 80048 BASIC METABOLIC PNL TOTAL CA: CPT | Performed by: PHYSICIAN ASSISTANT

## 2025-05-09 PROCEDURE — 258N000003 HC RX IP 258 OP 636: Performed by: STUDENT IN AN ORGANIZED HEALTH CARE EDUCATION/TRAINING PROGRAM

## 2025-05-09 PROCEDURE — 999N000208 ECHOCARDIOGRAM COMPLETE

## 2025-05-09 PROCEDURE — 92526 ORAL FUNCTION THERAPY: CPT | Mod: GN | Performed by: SPEECH-LANGUAGE PATHOLOGIST

## 2025-05-09 PROCEDURE — 250N000009 HC RX 250: Mod: JZ | Performed by: STUDENT IN AN ORGANIZED HEALTH CARE EDUCATION/TRAINING PROGRAM

## 2025-05-09 PROCEDURE — 250N000013 HC RX MED GY IP 250 OP 250 PS 637: Performed by: PHYSICIAN ASSISTANT

## 2025-05-09 PROCEDURE — 85004 AUTOMATED DIFF WBC COUNT: CPT | Performed by: PHYSICIAN ASSISTANT

## 2025-05-09 PROCEDURE — 99207 PR APP CREDIT; MD BILLING SHARED VISIT: CPT | Mod: FS | Performed by: PHYSICIAN ASSISTANT

## 2025-05-09 PROCEDURE — 258N000003 HC RX IP 258 OP 636: Performed by: PHYSICIAN ASSISTANT

## 2025-05-09 PROCEDURE — 36415 COLL VENOUS BLD VENIPUNCTURE: CPT | Performed by: PHYSICIAN ASSISTANT

## 2025-05-09 RX ORDER — DEXTROSE MONOHYDRATE AND SODIUM CHLORIDE 5; .45 G/100ML; G/100ML
INJECTION, SOLUTION INTRAVENOUS CONTINUOUS
Status: DISCONTINUED | OUTPATIENT
Start: 2025-05-09 | End: 2025-05-11

## 2025-05-09 RX ORDER — ASPIRIN 81 MG/1
81 TABLET ORAL DAILY
Status: ON HOLD | COMMUNITY
End: 2025-06-11

## 2025-05-09 RX ORDER — LEVETIRACETAM 5 MG/ML
500 INJECTION INTRAVASCULAR EVERY 12 HOURS
Status: DISCONTINUED | OUTPATIENT
Start: 2025-05-10 | End: 2025-05-14

## 2025-05-09 RX ADMIN — ASPIRIN 300 MG: 300 SUPPOSITORY RECTAL at 10:48

## 2025-05-09 RX ADMIN — HUMAN ALBUMIN MICROSPHERES AND PERFLUTREN 5 ML: 10; .22 INJECTION, SOLUTION INTRAVENOUS at 09:49

## 2025-05-09 RX ADMIN — INSULIN GLARGINE 10 UNITS: 100 INJECTION, SOLUTION SUBCUTANEOUS at 22:20

## 2025-05-09 RX ADMIN — DEXTROSE AND SODIUM CHLORIDE: 5; 450 INJECTION, SOLUTION INTRAVENOUS at 10:06

## 2025-05-09 RX ADMIN — LEVOTHYROXINE SODIUM 66 MCG: 20 INJECTION, SOLUTION INTRAVENOUS at 08:58

## 2025-05-09 RX ADMIN — LEVETIRACETAM 750 MG: 100 INJECTION, SOLUTION INTRAVENOUS at 05:58

## 2025-05-09 RX ADMIN — DICLOFENAC SODIUM 2 G: 10 GEL TOPICAL at 16:07

## 2025-05-09 RX ADMIN — DEXTROSE AND SODIUM CHLORIDE: 5; 450 INJECTION, SOLUTION INTRAVENOUS at 23:55

## 2025-05-09 RX ADMIN — DICLOFENAC SODIUM 2 G: 10 GEL TOPICAL at 09:02

## 2025-05-09 RX ADMIN — LEVETIRACETAM 750 MG: 100 INJECTION, SOLUTION INTRAVENOUS at 17:46

## 2025-05-09 RX ADMIN — DICLOFENAC SODIUM 2 G: 10 GEL TOPICAL at 12:25

## 2025-05-09 ASSESSMENT — ACTIVITIES OF DAILY LIVING (ADL)
ADLS_ACUITY_SCORE: 74
ADLS_ACUITY_SCORE: 74
ADLS_ACUITY_SCORE: 78
ADLS_ACUITY_SCORE: 78
ADLS_ACUITY_SCORE: 74
ADLS_ACUITY_SCORE: 78
ADLS_ACUITY_SCORE: 75
ADLS_ACUITY_SCORE: 78
ADLS_ACUITY_SCORE: 75
ADLS_ACUITY_SCORE: 75
ADLS_ACUITY_SCORE: 74
ADLS_ACUITY_SCORE: 75
ADLS_ACUITY_SCORE: 78
ADLS_ACUITY_SCORE: 74
ADLS_ACUITY_SCORE: 75
ADLS_ACUITY_SCORE: 75
ADLS_ACUITY_SCORE: 74
ADLS_ACUITY_SCORE: 78
ADLS_ACUITY_SCORE: 74
ADLS_ACUITY_SCORE: 75
ADLS_ACUITY_SCORE: 74

## 2025-05-09 NOTE — PLAN OF CARE
Goal Outcome Evaluation:      Plan of Care Reviewed With: patient    Overall Patient Progress: no changeOverall Patient Progress: no change    Outcome Evaluation: Alert with confusion, continue to have slurred speech. Pulled out PIV with new one replaced. Pt still on 1:1. BG of greater than 190 mg/dl with coverage. Denied pain/discomfort.

## 2025-05-09 NOTE — PLAN OF CARE
Goal Outcome Evaluation:      Plan of Care Reviewed With: patient    Overall Patient Progress: no changeOverall Patient Progress: no change    Outcome Evaluation: Continue to be confuse, BG level at 0200 was 102 mg/dl. Incont of urine this shift.

## 2025-05-09 NOTE — PROGRESS NOTES
[facility]    Medicine Progress Note - Hospitalist Service, GOLD TEAM 6    Date of Admission:  5/7/2025    Assessment & Plan   Isabell Matthews is a 64 year old female with history of HTN, HLD, DM type I, diabetic neuropathy, CKD3b, orthostatic hypotension, seizures, vascular dementia, hypothyroidism, recurrent UTI's, and freuqent falls who was admitted to [facility] with concern for stroke after presenting with acute neurologic symptoms.    Updates for 5/9/2025:  - NG feeding tube placement  - Nutrition consult for TF  - Endocrine consult for DM recommendations  - Neurology following, continue ND Aspirin until taking PO        # Acute R Cerebral Infarct  # Hx multiple prior CVAs  # Hx of seizures  Presents with new findings of slurred speech, dysphagia, decreased responsiveness, confusion. No new focal neurological deficits noted on exam. Head CT negative for acute infarct or hemorrhage. CTA with multifocal stenosis involving left RADHA (A2) and right MCA which has progressed since 1/16 study. Stroke Team consulted. Presentation felt to be recrudescence in setting of acute encephalopathy, although cannot completely r/o stroke. EEG negative. Loaded with keppra on arrival. MRI showed acute infarcts within the R corona radiata, precentral gyrus, and operculum. Presenting symptoms do not completely explain focal deficits. Not a candidate for advanced therapies. Overall remains clinically stable. No improvement in neurologic status.   - Neuro stroke team following  - Q4h neuro checks  - NPO per SLP  - Continue ASA 300mg ND for now  - Once able to take PO, switch to DAPT with ASA + Plavix x 90 days (then ASA 325mg lifelong)  - Atorvastatin 40mg daily (currently held while NPO)  - Keppra 750mg IV q12h  - Monitor on telemetry for arrhythmia  - Will need Ziopatch x 14 days upon discharge    # Acute metabolic encephalopathy, likely multifactorial   # Hx of vascular dementia  Acute change in  status possibly secondary to acute stroke vs encephalopathy.  UTI and hyperglycemia likely contributing. No significant electrolyte abnormalities. No suspicious meds. TSH 0.14. No agitation noted. More sedated today, possibly due to ativan for MRI.   - Monitor clinically  - Delirium precautions    # UTI, recurrent  Hx recurrent UTI. Urine culture from 5/1 shows pan-susceptible E.Coli. Aebrile, WBC normal. No other obvious source of infection. Received 1 day of cephalexin prior to presenting to ED. Started on ceftriaxone in the ED. Per daughter, does have worsening 'stroke-like' symptoms during her UTI's in the past. Repeat UA/UC negative.   - Continue IV ceftriaxone     # Dysphagia  Patient failed initial swallow study. Likely in the setting of acute encephalopathy. SLP consulted and noted patient held food in mouth without any attempt to initiate swallow. Per discussion with daughter, similar symptoms in past when she had hemorrhagic stroke. Likely recrudescence vs acute stroke. Rising Na concerning for free water deficit. Discussed nutrition/hydration options with daughter.   - Strict NPO per SLP  - Hold PO meds, convert to IV as able  - NG feeding tube placement  - Nutrition consulted for tube feeds    # Hypernatremia:  Free water deficit in setting of dysphagia and NPO status. Discussed feeding tube placement with daugther, who is agreeable.   - NG feeding tube placement as above  - Continue D5 1/2 NS for now (stop once TF started)  - Repeat Na pending    # Possible Stress Cardiomyopathy:  Echo shows LVEF 50-55% with mid ventricular hypokinesis w/ preserved apical and mid segements c/w stress CM. No obvious signs of heart failure.   - Monitor     #Type 1 DM  A1c of 8.8% on 3/2025. BS in 300-400 this admission. NPO due to dysphagia. Hx of labile sugars and hypoglycemia in setting of infection. Home regimen: Lantus 18u at bedtime. Starting tube feeds. Case discussed with endocrine.   - Endocrine consult  - Plan to  start insulin drip when TF initiated  - Decrease Lantus to 10U at bedtime  - MSSI  - BG checks q4h  - Hypoglycemia protocol      #Hypothyroidism:  most recent TSH 0.99. Repeat TSH 0.14.  Daughter notes recent medication adjustments (brand synthroid vs generic) therefore may have been over-replaced.   - Hold PTA synthroid 88mcg  - Continue IV levothyroxine 66mg daily  - Recheck TSH in 1-2 weeks when clinically stable     #CKD3b  Baseline Cr of 1.4-1.6. Cr stable at 1.6.   - BMP daily     #HTN  #HLD  -180 on admission. Can allow for permissive hypertension in setting of acute stroke.  - Hold carvedilol  - Hold atorvastatin, aspirin  - IV hydralazine PRN for SBP>180     # Chronic low back pain  - Diclofenac gel  - Hold duloxetine, gabapentin     # Seasonal allergies  - Hold loratidine            Diet: NPO for Medical/Clinical Reasons Except for: No Exceptions    DVT Prophylaxis: Pneumatic Compression Devices  Parker Catheter: Not present  Lines: None     Cardiac Monitoring: ACTIVE order. Indication: Stroke, acute (48 hours)  Code Status: No CPR- Do NOT Intubate      Clinically Significant Risk Factors         # Hypernatremia: Highest Na = 147 mmol/L in last 2 days, will monitor as appropriate  # Hyperchloremia: Highest Cl = 115 mmol/L in last 2 days, will monitor as appropriate              # Hypertension: Noted on problem list     # Dementia: noted on problem list            # Financial/Environmental Concerns:           Social Drivers of Health    Food Insecurity: Unknown (3/25/2025)    Food Insecurity     Within the past 12 months, did you worry that your food would run out before you got money to buy more?: Patient unable to answer     Within the past 12 months, did the food you bought just not last and you didn t have money to get more?: Patient unable to answer   Depression: At risk (4/2/2025)    PHQ-2     PHQ-2 Score: 4   Housing Stability: Unknown (3/25/2025)    Housing Stability     Do you have housing? :  Patient unable to answer     Are you worried about losing your housing?: Patient unable to answer   Tobacco Use: Medium Risk (4/24/2025)    Patient History     Smoking Tobacco Use: Former     Smokeless Tobacco Use: Never   Financial Resource Strain: Unknown (3/25/2025)    Financial Resource Strain     Within the past 12 months, have you or your family members you live with been unable to get utilities (heat, electricity) when it was really needed?: Patient unable to answer   Transportation Needs: Unknown (3/25/2025)    Transportation Needs     Within the past 12 months, has lack of transportation kept you from medical appointments, getting your medicines, non-medical meetings or appointments, work, or from getting things that you need?: Patient unable to answer   Interpersonal Safety: Unknown (3/25/2025)    Interpersonal Safety     Do you feel physically and emotionally safe where you currently live?: Patient unable to answer     Within the past 12 months, have you been hit, slapped, kicked or otherwise physically hurt by someone?: Patient unable to answer     Within the past 12 months, have you been humiliated or emotionally abused in other ways by your partner or ex-partner?: Patient unable to answer          Disposition Plan     Medically Ready for Discharge: Anticipated in 2-4 Days           The patient's care was discussed with the Attending Physician, Dr. Pena and Neurology Consultant(s).    Javier Johnson PA-C  Hospitalist Service, [address]  Securely message with [employer] (more info)  Text page via [facility] Paging/Directory   See signed in provider for up to date coverage information  ______________________________________________________________________    Interval History   Unresponsive this morning. No acute events overnight. No agitation.     Physical Exam   Vital Signs: Temp: 98.2  F (36.8  C) Temp src: Axillary BP: (!) 155/63 Pulse: 88    Resp: 20 SpO2: 96 % O2 Device: None (Room air)    Weight: 0 lbs 0 oz    General Appearance:  Does not awaken to voice or physical stimuli  HEENT:  Left facial droop.   Respiratory:  Normal effort. CTAB. No wheezing, rhonchi, rales.   Cardiovascular:  RRR. S1/S2. No murmurs.   GI:  Soft, non-distended, non-tender, +BS.   Extremity:  No pitting edema.   Skin:  No visible rash.   Neuro:  Unable to perform exam due to patient unresponsiveness.     Medical Decision Making       50 MINUTES SPENT BY ME on the date of service doing chart review, history, exam, documentation & further activities per the note.      Data     I have personally reviewed the following data over the past 24 hrs:    8.4  \   9.9 (L)   / 236     147 (H) 115 (H) 30.7 (H) /  138 (H)   3.9 20 (L) 1.68 (H) \     TSH: N/A T4: N/A A1C: N/A       Imaging results reviewed over the past 24 hrs:   Recent Results (from the past 24 hours)   Echo Complete   Result Value    LVEF  50-55% (borderline)    Narrative    [id number]  [id number]  [id number]  [id number]^JASPAL^AMBER^RAUL     [facility],[place]  Echocardiography Laboratory  [address] [place] [address]     Name: RANDI WILKS  MRN: [id number]  : 1960  Study Date: 2025 09:35 AM  Age: 64 yrs  Gender: Female  Patient Location: [facility]  Reason For Study: CVA  Ordering Physician: AMBER SHAY  Performed By: Faraz Waller     BSA: 1.6 m2  Height: 63 in  Weight: 137 lb  BP: 156/78 mmHg  ______________________________________________________________________________  Procedure  Echocardiogram with two-dimensional, color and spectral Doppler.  ______________________________________________________________________________  Interpretation Summary  Left ventricular function is decreased. The ejection fraction is 50-55%  (borderline). Mid ventricular hypokinesis with preserved apical and mid  segments.  The right ventricle is normal size.  Global right ventricular  function is normal.  The inferior vena cava was normal in size with preserved respiratory  variability.  No significant valvular abnormalities present.  Trivial pericardial effusion is present.     This study was compared with the study from 6/17/2024 .Mid ventricular  hypokinesis with preserved apical and mid segments is suggestive of stress  induced CMP.     ______________________________________________________________________________  Left Ventricle  Left ventricular size is normal. Mild concentric wall thickening consistent  with left ventricular hypertrophy is present. Left ventricular diastolic  function is normal. Left ventricular function is decreased. The ejection  fraction is 50-55% (borderline). Mid ventricular hypokinesis with preserved  apical and mid segments are suggestive of stress induced CMP.     Right Ventricle  The right ventricle is normal size. Global right ventricular function is  normal.     Atria  The atria cannot be assessed.     Mitral Valve  The mitral valve is normal. Trace mitral insufficiency is present.     Aortic Valve  The aortic valve is tricuspid. On Doppler interrogation, there is no  significant stenosis or regurgitation.     Tricuspid Valve  The tricuspid valve is normal.     Pulmonic Valve  On Doppler interrogation, there is no significant stenosis or regurgitation.     Vessels  The aorta root is normal. The inferior vena cava was normal in size with  preserved respiratory variability.     Pericardium  Trivial pericardial effusion is present.     Miscellaneous  No significant valvular abnormalities present.     Compared to Previous Study  This study was compared with the study from 6/17/2024 .  ______________________________________________________________________________  MMode/2D Measurements & Calculations  LVOT diam: 2.0 cm  LVOT area: 3.2 cm2  EF Biplane: 55.5 %     Doppler Measurements & Calculations  MV E max mady: 64.5 cm/sec  MV A max mady: 119.5 cm/sec  MV E/A:  0.54  Ao V2 max: 128.3 cm/sec  Ao max P.6 mmHg  Ao V2 mean: 99.3 cm/sec  Ao mean P.2 mmHg  Ao V2 VTI: 28.7 cm  E/E' av.2  Lateral E/e': 7.4  Medial E/e': 16.9  RV S Manoj: 12.3 cm/sec     ______________________________________________________________________________  Report approved by: ARMEN MCCRACKEN MD on 2025 10:29 AM

## 2025-05-09 NOTE — MEDICATION SCRIBE - ADMISSION MEDICATION HISTORY
Medication Scribe Admission Medication History    Admission medication history is complete. The information provided in this note is only as accurate as the sources available at the time of the update.    Information Source(s): Family member via phone    Pertinent Information: Vishal (daughter) reports that she did not start her on the levETIRAcetam (KEPPRA) 250 MG tablet since she had too many changes in her medications. Per daughter, she is taking levETIRAcetam (KEPPRA) 500 MG tablet morning and evening. She was on her 3rd dose of the cephALEXin (KEFLEX) 500 MG capsule and using the chlorhexidine (PERIDEX) 0.12 % solution. Daughter mentioned she has not started the meclizine (ANTIVERT) 12.5 MG tablet since it is a new medication. Daughter denies that she is taking any other medications. Dispense report and outside medication reconciliation list have been reviewed.     Changes made to PTA medication list:  Added:   aspirin 81 MG EC tablet (OTC)  Deleted:   aspirin (ASA) 81 MG chewable tablet   levothyroxine (SYNTHROID/LEVOTHROID) 100 MCG tablet DUPLICATE 2x  levETIRAcetam (KEPPRA) 250 MG tablet   Changed:   levETIRAcetam (KEPPRA) 500 MG tablet. Take 1 tablet (500 mg) by mouth every evening.  levETIRAcetam (KEPPRA) 500 MG tablet. Take 500 mg by mouth 2 times daily.    Allergies reviewed with patient and updates made in EHR: yes    Medication History Completed By: Sammi Cueva 5/9/2025 3:42 PM    PTA Med List   Medication Sig Note Last Dose/Taking    acetaminophen (TYLENOL) 325 MG tablet Take 3 tablets (975 mg) by mouth every 8 hours.  5/4/2025 Evening    aspirin 81 MG EC tablet Take 81 mg by mouth daily.  5/6/2025 Morning    atorvastatin (LIPITOR) 40 MG tablet Take 1 tablet (40 mg) by mouth daily.  5/5/2025 Evening    blood glucose (NO BRAND SPECIFIED) test strip Use to test blood sugar 4 times daily or as directed.  Taking    blood glucose monitoring (ONE TOUCH ULTRA MINI) meter device kit Use to test blood  sugar 4 times daily or as directed.  Taking    carvedilol (COREG) 6.25 MG tablet Take 1 tablet (6.25 mg) by mouth 2 times daily (with meals).  5/6/2025 Morning    cephALEXin (KEFLEX) 500 MG capsule Take 1 capsule (500 mg) by mouth 3 times daily.  5/6/2025 Noon    chlorhexidine (PERIDEX) 0.12 % solution Take 30 mLs by mouth daily. Swish and spit 30 mL once daily for 14 days.Swish and spit 30 mL once daily for 14 days.  5/6/2025 Morning    Continuous Glucose Sensor (FREESTYLE MARIA FERNANDA 2 PLUS SENSOR) MISC Change every 15 days.  Taking    Continuous Glucose Sensor (FREESTYLE MARIA FERNANDA 2 SENSOR) MISC Change every 14 days.  Taking    cyanocobalamin (VITAMIN B-12) 1000 MCG tablet Take 1 tablet (1,000 mcg) by mouth daily  5/6/2025 Morning    diclofenac (VOLTAREN) 1 % topical gel Apply 2 g topically 4 times daily.  5/6/2025 Morning    DULoxetine (CYMBALTA) 20 MG capsule TAKE 1 CAPSULE(20 MG) BY MOUTH DAILY  5/5/2025 Evening    gabapentin (NEURONTIN) 100 MG capsule Take 1 capsule (100 mg) by mouth at bedtime.  5/5/2025 Evening    Glucagon (GVOKE HYPOPEN) 1 MG/0.2ML pen Inject the contents of 1 device under the skin into lower abdomen, outer thigh, or outer upper arm as needed for hypoglycemia. If no response after 15 minutes, additional 1 mg dose from a new device may be injected while waiting for emergency assistance.  5/2/2025 Morning    hydrALAZINE (APRESOLINE) 10 MG tablet Take one po bid prn if systolic blood pressure over 160  5/5/2025 Noon    insulin aspart (NOVOLOG FLEXPEN) 100 UNIT/ML pen Novolog Flexpen: 1 unit per 15 grams of carbohydrates with meals  5/6/2025 Noon    insulin aspart (NOVOLOG FLEXPEN) 100 UNIT/ML pen Inject 2 units with meals plus correction scale additional three times daily before meals  as follows  Pre-Meal  - 200 give additional 1 unit.  For Pre-Meal  -250 give 2 additional units.  For Pre-Meal -300 give 3 additional units.  For Pre-Meal -350 give 4 additional units.  For  Pre-Meal -400 give 5 additional units Max 25 units daily (Patient taking differently: Inject 2 units with meals plus correction scale additional three times daily before meals  as follows  Pre-Meal  - 200 give additional 1 unit.  For Pre-Meal  -250 give 2 additional units.  For Pre-Meal -300 give 3 additional units.  For Pre-Meal -350 give 4 additional units.  For Pre-Meal -400 give 5 additional units Max 20 units daily.)  5/6/2025 at  5:00 PM    insulin glargine (LANTUS PEN) 100 UNIT/ML pen Inject 18 Units subcutaneously at bedtime.  5/5/2025 Evening    levETIRAcetam (KEPPRA) 500 MG tablet Take 1 tablet (500 mg) by mouth every evening. (in addition to the separate prescription for 250 mg taken in the morning) (Patient taking differently: Take 500 mg by mouth 2 times daily.)  5/6/2025 Morning    Lidocaine (LIDOCARE) 4 % Patch Place 1 patch over 12 hours onto the skin every 24 hours. To prevent lidocaine toxicity, patient should be patch free for 12 hrs daily.  5/6/2025 Morning    loratadine (CLARITIN) 10 MG tablet Take 1 tablet (10 mg) by mouth daily.  5/5/2025 Evening    meclizine (ANTIVERT) 12.5 MG tablet Take 1 tablet (12.5 mg) by mouth 3 times daily as needed for dizziness. 5/9/2025: Has not started Taking As Needed    methocarbamol (ROBAXIN) 500 MG tablet Take 1 tablet (500 mg) by mouth daily as needed for muscle spasms.  Past Month    PENTIPS 32G X 4 MM miscellaneous Inject 1 each subcutaneously daily. Use 4 pen needles daily or as directed.  Taking    SYNTHROID 88 MCG tablet Take 1 tablet (88 mcg) by mouth every morning (before breakfast).  5/6/2025 Morning    zinc oxide (DESITIN) 20 % external ointment Apply topically as needed for dry skin or irritation  Taking As Needed

## 2025-05-09 NOTE — PROGRESS NOTES
"Brief Diabetes Note:    Reason for Consult: \"Diabetic with history of labile blood sugars. Admitted with possible stroke vs encephalopathy. Unable to take PO. Starting TF. Assistance with insulin dosing.\"   Person requesting consult:  Javier Johnson PA-C     HPI: Isabell Matthews is a 64 year old female with history of HTN, HLD, DM type I, diabetic neuropathy, CKD3b, orthostatic hypotension, seizures, vascular dementia, hypothyroidism, recurrent UTI's, and freuqent falls who was admitted to [facility] 5/7/25 with concern for stroke after presenting with acute neurologic symptoms.     PTA DM Regimen per chart review:   From discharge 3/30/25:  - Lantus 18 units once daily at HS  - Lispro ICR 1:15 TID AC, 1:20 PRN with snacks/supplements  - Lispro correction 1:50>150 TID AC, >200 HS    Current inpatient DM Regimen:   - Lantus 16 units q 24 hours at HS  - Novolog correction 1:25>140 q4 hours (high resistance)  - D5 running at 75 mL/hr since 1000 5/9/25    Steroids/procedures: none known    Labs:    Latest Reference Range & Units 05/09/25 07:52   Sodium 135 - 145 mmol/L 147 (H)   Potassium 3.4 - 5.3 mmol/L 3.9   Chloride 98 - 107 mmol/L 115 (H)   Carbon Dioxide (CO2) 22 - 29 mmol/L 20 (L)   Urea Nitrogen 8.0 - 23.0 mg/dL 30.7 (H)   Creatinine 0.51 - 0.95 mg/dL 1.68 (H)   GFR Estimate >60 mL/min/1.73m2 33 (L)   Calcium 8.8 - 10.4 mg/dL 8.9   Anion Gap 7 - 15 mmol/L 12   Glucose 70 - 99 mg/dL 77   WBC 4.0 - 11.0 10e3/uL 8.4   Hemoglobin 11.7 - 15.7 g/dL 9.9 (L)   Hematocrit 35.0 - 47.0 % 31.0 (L)   Platelet Count 150 - 450 10e3/uL 236   RBC Count 3.80 - 5.20 10e6/uL 3.63 (L)   MCV 78 - 100 fL 85   MCH 26.5 - 33.0 pg 27.3   MCHC 31.5 - 36.5 g/dL 31.9   RDW 10.0 - 15.0 % 15.7 (H)         Assessment:   Type 1 Diabetes Mellitus with DEMETRIA elevated at 6.2 and C-peptide=<0.1 on 6/17/2024, complicated by peripheral neuropathy, nephropathy, hx of DKA and hypoglycemia. Sub-optimal control. (A1c 8.8 % 3/23/25, Hgb: 10.5) goal A1c more " appropriately 7.5%.    Glucose variable on current regimen and anticipate TF starting tonight.    Discussion with primary team: Pt with significant glycemic lability at baseline here with stroke-like symptoms. Now NPO with TF anticipated to start tonight. Unclear if pt received any basal insulin 5/7 which could contribute to hyperglycemia seen on 5/8. For safety and given pt history will reduce correction and Lantus (Lantus dose reduced to safe dose while pt NPO incase of TF interruptions). Pt not reliably able to communicate low BG and given unknown formula, rate, start time, advancement times I recommend starting an insulin drip when TF start. May need to wait until TF at goal to transition off of insulin drip.     Recommendations:   - Change Lantus 16 --> 10 units units q 24 hrs at 2200  - Change Novolog Correction Scale: 1:25 --> 50>140 q 4 hours (high --> medium resistance)    - BG Monitoring: q 4 hours  - Hypoglycemia protocol  - Carb counting protocol     When starting Tube feeds:  - Start IV insulin drip non-DKA protocol; DO NOT ADVANCE PAST ALGORITHM 1  - Lantus 10 units q 24 hours at 2200 (Give even if NPO or on insulin drip)  - Hold: Novolog Correction Scale: 1:50>140 q 4 hours (medium resistance)  - Stop: D5 fluids. Ensure PRN D10 is ordered in case of TF interruption.   - BG Monitoring: q 1-2 hours while on insulin drip.   - Hypoglycemia protocol      Inpatient Diabetes will see this patient for a full consult 5/10/25.     Mirela Anand PA-C  Inpatient Diabetes Service  Available on [employer] or Secure Chat    To contact Inpatient Diabetes Service:     7 AM - 5 PM: Page the [employer] following the patient that day (see filed or incomplete progress notes/consult notes under Endocrinology)    OR if uncertain of provider assignment: page job code [phone]    5 PM - 7 AM: First call after hours is to primary service.    For urgent after-hours questions, page job code for on call fellow: [phone]

## 2025-05-09 NOTE — SUMMARY OF CARE
Reason for admission: Acute neurologic symptoms  Admitted from:  ED  Report received from:    Mian RN       2 RN skin assessment completed by: Unable to complete      - Findings (add LDA if needed):   Bed algorithm reevaluated: Yes  Was airflow pump ordered?: No  Suction set up in room? Yes  Care plan (primary problem) and education initiated: Yes  MDRO education done if applicable: N/A  Pt informed about policy regarding no IV pumps off unit: Yes  Flu shot ordered? (October-April only): N/A  Detailed Belongings: Sweater, pants, brace     Arrived to unit @ 1850. Pt sleeping and disoriented x4. VSS stable. R PIV infusing D5 1/2NS @ 75 ml//hr. Plan to re-attempt NG placement again this evening. 1:1 attendant at bedside.

## 2025-05-09 NOTE — PROGRESS NOTES
[facility]    Vascular Neurology Progress Note    Interval History     RN notes reviewed. Pt remains NPO due to failing bedside evaluation with SLP yesterday. MRI was additionally completed yesterday and does reveal an acute/subacute infarct surrounding the prior area of right frontal encephalomalacia. Given MD aspirin 300mg last evening. Otherwise NAEO.     Hospital Course     Chief complaint: Stroke Symptoms    Isabell Matthews is a 64 year old woman with historyof HTN, HLD, CKD IIIb, T1DM c/b neuropathy, ischemic and hemorrhagic strokes (right basal ganglia ischemic infarct in 2018, ICAD left basal ganglia hemorrhagic stroke 2021, right centrum semiovale & right chuck 2023) with residual R facial droop and vascular dementia, seizures 2/2 DKA, and recurrent UTI who presented to the ED on 5/7 after her daughter noted an acute change in her functional status last evening.     The patient's daughter notes that her last known well was probably around 5:30 PM on 5/6.  Around 9 PM last night she noticed that her mother, who speaks slowly at baseline, was sounding much more quiet with somewhat slurred speech which was not normal for her.  In addition to this her mother had not been eating as much as normal and seemed to be unable to swallow safely.  They stated that they normally feed her and that they found most of the food that they were feeding her in her cheeks.  In hindsight, the daughter states that they have been noticing a steady decline in her function since this past Thursday or Friday, when she had been diagnosed with a UTI of which she has a history of.  She had been taking cephalexin for this a couple of days.  Addition, there have been recent changes to her Synthroid as well as her blood pressure medications.  The patient's daughter also notes that she has not been producing much urine for the past day.     On initial evaluation in the emergency room, her NIHSS  is 5 for mild aphasia, loss of fluency, mild right nasolabial fold flattening, and bilateral lower extremity drift.  Her CT revealed chronic changes from prior strokes, but no acute findings.  Similarly, her CTA head and neck were notable for multifocal stenosis of cerebral vasculature which appears slightly progressed since January of this year, likely secondary to intracranial atherosclerotic disease.  CTP was also obtained and notable for poor perfusion in the right frontotemporal region she has chronic encephalomalacia from a prior stroke.    Stroke risk factors: Higher ischemic, hemorrhagic strokes, HTN, diabetes, HLD, ICAD, CKD     Preadmission antithrombotic regimen: Aspirin 81 mg daily     Modified Timothy Score (Pre-morbid)  4-Moderately severe disability; unable to walk without assistance and unable to attend to own bodily    Assessment and Plan   # Acute infarcts within right corona radiata, precentral gyrus, and operculum   # Acute encephalopathy, likely multifactorial (dementia, active UTI, many vascular risk factors, prior strokes, seizure history)     Isabell Matthews is a 64 year old woman with historyof HTN, HLD, CKD IIIb, T1DM c/b neuropathy, ischemic and hemorrhagic strokes (right basal ganglia ischemic infarct in 2018, ICAD left basal ganglia hemorrhagic stroke 2021, right centrum semiovale & right chuck 2023) with residual R facial droop and vascular dementia, seizures 2/2 DKA, and recurrent UTI for whom a stroke code was called for acute onset speech changes and worsening of baseline cognition. She was ultimately deemed not a candidate for IV thrombolytic therapy given history of ICH, minor and intermittent symptoms, and suspicion for a mimic (UT).     Her MRI brain, however, showed multiple small acute infarcts involving the right frontal lobe near her chronic encephalomalacia from prior stroke, which may partially explain her slight worsening of baseline symptoms. We opted to complete a full stroke  workup. Treatment is c/b ongoing dysphagia and continued NPO status. Due to her ICAD, she should ideally be on DAPT x90 days once able to take PO medications. She should stay on aspirin [place] 300mg daily until diet is advanced, then should be escalated to aspirin 325mg daily indefinitely and plavix 75mg daily x90 days.     - A1c is 8.8, goal < 7.0   - PCP follow up for better blood sugar control  - LDL is 60, goal < 70   - Continue PTA atorvastatin 40mg when safe from SLP standpoint  - Continue aspirin 300mg [place] pending diet evaluation  - When diet is advanced:  - Switch to aspirin 325mg daily indefinitiely   - Start plavix 75mg daily x90 days   - If pt will require PEG, hold off on plavix until after PEG  - Cardiac monitor while hospitalized, Zio patch x14 days at discharge  - Decreased keppra to PTA of 500mg BID after negative EEG    - Vascular neurology will follow peripherally        Patient Follow-up    - Stroke follow up in 6-8 weeks after discharge      The patient was discussed with stroke staff, Dr. Wilson.    Yony Adam MD  Neurology Resident, PGY-2  05/09/2025 8:43 AM       Physical Examination     Temp: 98.2  F (36.8  C) Temp src: Axillary BP: (!) 155/63 Pulse: 88   Resp: 20 SpO2: 96 % O2 Device: None (Room air)      General Exam  General:  patient lying in bed without any acute distress    HEENT:  normocephalic/atraumatic  Cardio:  RRR  Pulmonary:  no respiratory distress  Abdomen:  soft, non-tender  Extremities:  no edema  Skin:  intact       Neuro Exam  Mental Status: Alert, oriented to self.  More somnolent today, but follows commands, naming and repetition normal.  Mild aphasia versus more general encephalopathy  Cranial Nerves:  visual fields intact, PERRL, EOMI with normal smooth pursuit, facial sensation intact and symmetric, hearing not formally tested but intact to conversation, palate elevation symmetric and uvula midline, no dysarthria, shoulder shrug strong bilaterally.  Baseline subtle  left facial droop  Motor:  normal muscle tone and bulk, no abnormal movements, able to move all limbs spontaneously, no pronator drift, bilateral lower extremities with slight drift but not to bed  Reflexes:  no clonus, toes down-going  Sensory:  light touch sensation intact and symmetric throughout upper and lower extremities, no extinction on double simultaneous stimulation   Coordination:  deferred  Station/Gait:  deferred    Stroke Scales       NIHSS  1a. Level of Consciousness 1-->   1b. LOC Questions 0-->Answers both questions correctly   1c. LOC Commands 0-->Performs both tasks correctly   2.   Best Gaze 0-->Normal   3.   Visual 0-->No visual loss   4.   Facial Palsy 1-->Minor paralysis (flattened nasolabial fold, asymmetry on smiling)   5a. Motor Arm, Left 0-->No drift, limb holds 90 (or 45) degrees for full 10 secs   5b. Motor Arm, Right 0-->No drift, limb holds 90 (or 45) degrees for full 10 secs   6a. Motor Leg, Left 1-->Drift, leg falls by the end of the 5-sec period but does not hit bed   6b. Motor Leg, right 1-->Drift, leg falls by the end of the 5-sec period but does not hit bed   7.   Limb Ataxia 0-->Absent   8.   Sensory 0-->Normal, no sensory loss   9.   Best Language 1-->Mild-to-moderate aphasia, some obvious loss of fluency or facility of comprehension, without significant limitation on ideas expressed or form of expression. Reduction of speech and/or comprehension, however, makes conversation. . . (see row details)   10. Dysarthria 1-->Mild-to-moderate dysarthria, patient slurs at least some words and, at worst, can be understood with some difficulty   11. Extinction and Inattention  0-->No abnormality   Total 6 (05/09/25)       Imaging/Labs   (Bolded imaging and labs new and/or personally reviewed or re-reviewed by me today)    MRI/Head CT CT head:   Impression:   1. No acute intracranial hemorrhage.  2. ASPECT Score: 10/10.  3. Chronic encephalomalacia of the right  frontotemporal  periventricular region likely related to prior infarct. Chronic small  vessel ischemic disease and generalized cerebral volume loss.    CTP:  No evidence acute infarction on the CT Perfusion examination. Poor  perfusion in the right frontotemporal region compatible chronic  encephalomalacia in the region.    MRI brain:  IMPRESSION:  1. Acute infarcts within the right corona radiata, precentral gyrus,  and operculum.  2. Chronic infarcts in the right basal ganglia and left insula   Intracranial Vasculature Head CTA demonstrates no definite aneurysm is identified.  Progressed short segment stenosis/occlusion of the left A2 anterior  cerebral artery. Similar occlusion of the anterior division of the  right middle cerebral artery. Multifocal stenosis/beaded appearance of  the right middle cerebral artery M2 and M3 segments, which appears  slightly progressed from 1/16/2025 and may be related to  atherosclerotic disease.    Cervical Vasculature Neck CTA demonstrates no hemodynamically significant stenosis of  the major cervical arteries. Less than 50%  stenosis of the right  carotid bulb and approximately 50% of calcific stenosis of the left  carotid bulb, unchanged from prior CT 1/16/2025.     Echocardiogram Pending   EKG/Telemetry Sinus tachycardia   Nonspecific ST abnormality   Abnormal ECG      LDL  5/8/2025: 60 mg/dL   A1C  3/23/2025: 8.8 %   Troponin 5/7/2025: 52 ng/L     Other labs reviewed by me today:  Recent Results (from the past 24 hours)   Glucose by meter    Collection Time: 05/08/25  4:06 PM   Result Value Ref Range    GLUCOSE BY METER POCT 309 (H) 70 - 99 mg/dL   Glucose by meter    Collection Time: 05/08/25  8:31 PM   Result Value Ref Range    GLUCOSE BY METER POCT 197 (H) 70 - 99 mg/dL   Glucose by meter    Collection Time: 05/08/25 10:01 PM   Result Value Ref Range    GLUCOSE BY METER POCT 168 (H) 70 - 99 mg/dL   Glucose by meter    Collection Time: 05/09/25 12:01 AM   Result Value Ref  Range    GLUCOSE BY METER POCT 154 (H) 70 - 99 mg/dL   Glucose by meter    Collection Time: 05/09/25  2:00 AM   Result Value Ref Range    GLUCOSE BY METER POCT 102 (H) 70 - 99 mg/dL   Glucose by meter    Collection Time: 05/09/25  4:07 AM   Result Value Ref Range    GLUCOSE BY METER POCT 83 70 - 99 mg/dL   Glucose by meter    Collection Time: 05/09/25  5:54 AM   Result Value Ref Range    GLUCOSE BY METER POCT 81 70 - 99 mg/dL   Basic metabolic panel    Collection Time: 05/09/25  7:52 AM   Result Value Ref Range    Sodium 147 (H) 135 - 145 mmol/L    Potassium 3.9 3.4 - 5.3 mmol/L    Chloride 115 (H) 98 - 107 mmol/L    Carbon Dioxide (CO2) 20 (L) 22 - 29 mmol/L    Anion Gap 12 7 - 15 mmol/L    Urea Nitrogen 30.7 (H) 8.0 - 23.0 mg/dL    Creatinine 1.68 (H) 0.51 - 0.95 mg/dL    GFR Estimate 33 (L) >60 mL/min/1.73m2    Calcium 8.9 8.8 - 10.4 mg/dL    Glucose 77 70 - 99 mg/dL   CBC with platelets and differential    Collection Time: 05/09/25  7:52 AM   Result Value Ref Range    WBC Count 8.4 4.0 - 11.0 10e3/uL    RBC Count 3.63 (L) 3.80 - 5.20 10e6/uL    Hemoglobin 9.9 (L) 11.7 - 15.7 g/dL    Hematocrit 31.0 (L) 35.0 - 47.0 %    MCV 85 78 - 100 fL    MCH 27.3 26.5 - 33.0 pg    MCHC 31.9 31.5 - 36.5 g/dL    RDW 15.7 (H) 10.0 - 15.0 %    Platelet Count 236 150 - 450 10e3/uL    % Neutrophils 74 %    % Lymphocytes 18 %    % Monocytes 7 %    % Eosinophils 1 %    % Basophils 1 %    % Immature Granulocytes 0 %    NRBCs per 100 WBC 0 <1 /100    Absolute Neutrophils 6.2 1.6 - 8.3 10e3/uL    Absolute Lymphocytes 1.5 0.8 - 5.3 10e3/uL    Absolute Monocytes 0.6 0.0 - 1.3 10e3/uL    Absolute Eosinophils 0.1 0.0 - 0.7 10e3/uL    Absolute Basophils 0.0 0.0 - 0.2 10e3/uL    Absolute Immature Granulocytes 0.0 <=0.4 10e3/uL    Absolute NRBCs 0.0 10e3/uL   Echo Complete    Collection Time: 05/09/25 10:07 AM   Result Value Ref Range    LVEF  50-55% (borderline)    Glucose by meter    Collection Time: 05/09/25 12:22 PM   Result Value Ref  Range    GLUCOSE BY METER POCT 138 (H) 70 - 99 mg/dL

## 2025-05-09 NOTE — PLAN OF CARE
RN [id number]:     Vital signs: BP (!) 171/84   Pulse 81   Temp 98.2  F (36.8  C) (Axillary)   Resp 20   SpO2 100%     Status: admitted 5/7/2025 to [facility] with concern for stroke after presenting with acute neurologic symptoms   Neuro: disoriented x 4  Pain/Nausea: denies pain and nausea  Cardiac: WNL. NSR  Respiratory: sating adequately on room air  Mobility: assist x 2  Diet: strict NPO  Labs: reviewed. BG q4h  LDAs: R PIV with D5% and 0.45% NaCl at 75 mL/hr  Skin/incisions: no new deficits found  GI/: incontinent B/B  Plan: continue with plan of care, report given to 7C Amanda MARTEL

## 2025-05-10 ENCOUNTER — APPOINTMENT (OUTPATIENT)
Dept: GENERAL RADIOLOGY | Facility: CLINIC | Age: 65
DRG: 064 | End: 2025-05-10
Attending: PHYSICIAN ASSISTANT
Payer: COMMERCIAL

## 2025-05-10 ENCOUNTER — APPOINTMENT (OUTPATIENT)
Dept: GENERAL RADIOLOGY | Facility: CLINIC | Age: 65
End: 2025-05-10
Attending: PHYSICIAN ASSISTANT
Payer: COMMERCIAL

## 2025-05-10 ENCOUNTER — APPOINTMENT (OUTPATIENT)
Dept: SPEECH THERAPY | Facility: CLINIC | Age: 65
DRG: 064 | End: 2025-05-10
Payer: COMMERCIAL

## 2025-05-10 LAB
ANION GAP SERPL CALCULATED.3IONS-SCNC: 10 MMOL/L (ref 7–15)
BASE EXCESS BLDV CALC-SCNC: -0.8 MMOL/L (ref -3–3)
BUN SERPL-MCNC: 19.7 MG/DL (ref 8–23)
CALCIUM SERPL-MCNC: 8.9 MG/DL (ref 8.8–10.4)
CHLORIDE SERPL-SCNC: 112 MMOL/L (ref 98–107)
CREAT SERPL-MCNC: 1.38 MG/DL (ref 0.51–0.95)
EGFRCR SERPLBLD CKD-EPI 2021: 42 ML/MIN/1.73M2
ERYTHROCYTE [DISTWIDTH] IN BLOOD BY AUTOMATED COUNT: 15.4 % (ref 10–15)
GLUCOSE BLDC GLUCOMTR-MCNC: 152 MG/DL (ref 70–99)
GLUCOSE BLDC GLUCOMTR-MCNC: 171 MG/DL (ref 70–99)
GLUCOSE BLDC GLUCOMTR-MCNC: 197 MG/DL (ref 70–99)
GLUCOSE BLDC GLUCOMTR-MCNC: 203 MG/DL (ref 70–99)
GLUCOSE BLDC GLUCOMTR-MCNC: 208 MG/DL (ref 70–99)
GLUCOSE BLDC GLUCOMTR-MCNC: 220 MG/DL (ref 70–99)
GLUCOSE BLDC GLUCOMTR-MCNC: 224 MG/DL (ref 70–99)
GLUCOSE BLDC GLUCOMTR-MCNC: 224 MG/DL (ref 70–99)
GLUCOSE SERPL-MCNC: 166 MG/DL (ref 70–99)
HCO3 BLDV-SCNC: 24 MMOL/L (ref 21–28)
HCO3 SERPL-SCNC: 20 MMOL/L (ref 22–29)
HCT VFR BLD AUTO: 35.6 % (ref 35–47)
HGB BLD-MCNC: 10.9 G/DL (ref 11.7–15.7)
MAGNESIUM SERPL-MCNC: 1.6 MG/DL (ref 1.7–2.3)
MCH RBC QN AUTO: 26.8 PG (ref 26.5–33)
MCHC RBC AUTO-ENTMCNC: 30.6 G/DL (ref 31.5–36.5)
MCV RBC AUTO: 88 FL (ref 78–100)
O2/TOTAL GAS SETTING VFR VENT: 4 %
OXYHGB MFR BLDV: 85 % (ref 70–75)
PCO2 BLDV: 40 MM HG (ref 40–50)
PH BLDV: 7.39 [PH] (ref 7.32–7.43)
PHOSPHATE SERPL-MCNC: 2.5 MG/DL (ref 2.5–4.5)
PLATELET # BLD AUTO: 242 10E3/UL (ref 150–450)
PO2 BLDV: 53 MM HG (ref 25–47)
POTASSIUM SERPL-SCNC: 3.6 MMOL/L (ref 3.4–5.3)
POTASSIUM SERPL-SCNC: 3.8 MMOL/L (ref 3.4–5.3)
RBC # BLD AUTO: 4.06 10E6/UL (ref 3.8–5.2)
SAO2 % BLDV: 85.7 % (ref 70–75)
SODIUM SERPL-SCNC: 142 MMOL/L (ref 135–145)
WBC # BLD AUTO: 8.1 10E3/UL (ref 4–11)

## 2025-05-10 PROCEDURE — 74018 RADEX ABDOMEN 1 VIEW: CPT | Mod: 26 | Performed by: RADIOLOGY

## 2025-05-10 PROCEDURE — 92526 ORAL FUNCTION THERAPY: CPT | Mod: GN

## 2025-05-10 PROCEDURE — 36415 COLL VENOUS BLD VENIPUNCTURE: CPT | Performed by: PHYSICIAN ASSISTANT

## 2025-05-10 PROCEDURE — 99207 PR APP CREDIT; MD BILLING SHARED VISIT: CPT | Mod: FS | Performed by: PHYSICIAN ASSISTANT

## 2025-05-10 PROCEDURE — 999N000065 XR ABDOMEN PORT 1 VIEW

## 2025-05-10 PROCEDURE — 82805 BLOOD GASES W/O2 SATURATION: CPT | Performed by: PHYSICIAN ASSISTANT

## 2025-05-10 PROCEDURE — 80048 BASIC METABOLIC PNL TOTAL CA: CPT | Performed by: PHYSICIAN ASSISTANT

## 2025-05-10 PROCEDURE — 84132 ASSAY OF SERUM POTASSIUM: CPT

## 2025-05-10 PROCEDURE — 99255 IP/OBS CONSLTJ NEW/EST HI 80: CPT | Mod: 24

## 2025-05-10 PROCEDURE — 85014 HEMATOCRIT: CPT | Performed by: PHYSICIAN ASSISTANT

## 2025-05-10 PROCEDURE — 250N000013 HC RX MED GY IP 250 OP 250 PS 637: Performed by: PHYSICIAN ASSISTANT

## 2025-05-10 PROCEDURE — 120N000002 HC R&B MED SURG/OB UMMC

## 2025-05-10 PROCEDURE — 36415 COLL VENOUS BLD VENIPUNCTURE: CPT

## 2025-05-10 PROCEDURE — 258N000003 HC RX IP 258 OP 636: Performed by: PHYSICIAN ASSISTANT

## 2025-05-10 PROCEDURE — 82374 ASSAY BLOOD CARBON DIOXIDE: CPT | Performed by: PHYSICIAN ASSISTANT

## 2025-05-10 PROCEDURE — 84100 ASSAY OF PHOSPHORUS: CPT

## 2025-05-10 PROCEDURE — 83735 ASSAY OF MAGNESIUM: CPT

## 2025-05-10 PROCEDURE — 250N000011 HC RX IP 250 OP 636

## 2025-05-10 PROCEDURE — 250N000009 HC RX 250: Mod: JZ | Performed by: STUDENT IN AN ORGANIZED HEALTH CARE EDUCATION/TRAINING PROGRAM

## 2025-05-10 PROCEDURE — 99233 SBSQ HOSP IP/OBS HIGH 50: CPT | Mod: FS | Performed by: STUDENT IN AN ORGANIZED HEALTH CARE EDUCATION/TRAINING PROGRAM

## 2025-05-10 RX ORDER — DEXTROSE MONOHYDRATE 100 MG/ML
INJECTION, SOLUTION INTRAVENOUS CONTINUOUS PRN
Status: DISCONTINUED | OUTPATIENT
Start: 2025-05-10 | End: 2025-05-13

## 2025-05-10 RX ADMIN — ASPIRIN 300 MG: 300 SUPPOSITORY RECTAL at 10:43

## 2025-05-10 RX ADMIN — DICLOFENAC SODIUM 2 G: 10 GEL TOPICAL at 12:17

## 2025-05-10 RX ADMIN — LEVOTHYROXINE SODIUM 66 MCG: 20 INJECTION, SOLUTION INTRAVENOUS at 10:12

## 2025-05-10 RX ADMIN — LEVETIRACETAM 500 MG: 5 INJECTION INTRAVENOUS at 06:09

## 2025-05-10 RX ADMIN — INSULIN GLARGINE 10 UNITS: 100 INJECTION, SOLUTION SUBCUTANEOUS at 22:01

## 2025-05-10 RX ADMIN — DICLOFENAC SODIUM 2 G: 10 GEL TOPICAL at 08:52

## 2025-05-10 RX ADMIN — DICLOFENAC SODIUM 2 G: 10 GEL TOPICAL at 20:12

## 2025-05-10 RX ADMIN — DICLOFENAC SODIUM 2 G: 10 GEL TOPICAL at 16:31

## 2025-05-10 RX ADMIN — LEVETIRACETAM 500 MG: 5 INJECTION INTRAVENOUS at 17:05

## 2025-05-10 RX ADMIN — DEXTROSE AND SODIUM CHLORIDE: 5; 450 INJECTION, SOLUTION INTRAVENOUS at 12:23

## 2025-05-10 ASSESSMENT — ACTIVITIES OF DAILY LIVING (ADL)
ADLS_ACUITY_SCORE: 78
DEPENDENT_IADLS:: CLEANING;COOKING;LAUNDRY;SHOPPING;MEAL PREPARATION;MEDICATION MANAGEMENT;MONEY MANAGEMENT;TRANSPORTATION
ADLS_ACUITY_SCORE: 78

## 2025-05-10 NOTE — PROGRESS NOTES
Brief Internal Medicine Note:     Was notified by nursing staff that patient pulled NG tube. Planning to attempt replacement on 7C. Following replacement, would restart tube feed and insulin drip.     HEBERT Shrestha [facility]  Securely message with the Vocera Web Console (learn more here)  Text page via [facility] Paging/Directory

## 2025-05-10 NOTE — PROGRESS NOTES
[facility]    Medicine Progress Note - Hospitalist Service, GOLD TEAM 6    Date of Admission:  5/7/2025    Assessment & Plan   Isabell Matthews is a 64 year old female with history of HTN, HLD, DM type I, diabetic neuropathy, CKD3b, orthostatic hypotension, seizures, vascular dementia, hypothyroidism, recurrent UTI's, and freuqent falls who was admitted to [facility] with concern for stroke after presenting with acute neurologic symptoms.    Updates for 5/10/2025:  - Re-attempt NG placement (ok to use narrow tube if able to bridle)  - RD consulted for tube feeds  - Per Endocrine, hold dextrose fluids if starting TF and manage with sliding scale insulin.  Start non-DKA insulin drip if BG >250 x 2.        # Acute infarcts within right corona radiata, precentral gyrus, and operculum   # Hx multiple prior CVAs  # Hx of seizures  Presents with new findings of slurred speech, dysphagia, decreased responsiveness, confusion. No new focal neurological deficits noted on exam. Head CT negative for acute infarct or hemorrhage. CTA with multifocal stenosis involving left RADHA (A2) and right MCA which has progressed since 1/16. Neuro Stroke Team consulted. Loaded with keppra on arrival. EEG negative. ? Recrudescence vs new infarcts. MRI showed acute infarcts within the R corona radiata, precentral gyrus, and operculum. Not a candidate for advanced therapies. Remains non-verbal.   Currently no improvement in neurologic status, likely in context of concomitant encephalopathy.  - Neuro stroke team following  - Q4h neuro checks  - NPO per SLP  - Continue ASA 300mg MA for now  - Once able to take PO, switch to DAPT with ASA + Plavix x 90 days   - Atorvastatin 40mg daily (currently held while NPO)  - Keppra 750mg IV q12h  - Monitor on telemetry for arrhythmia  - Will need Ziopatch x 14 days upon discharge  - Stroke follow up in 8 weeks    # Acute metabolic encephalopathy, likely multifactorial   # Hx  of vascular dementia  Acute change in status possibly secondary to acute stroke vs encephalopathy.  Likely multifactorial with UTI, dehydration, hyperglycemia, hypercapnia, and hospital environment contributing. No significant electrolyte abnormalities. No suspicious meds. TSH 0.14. No agitation noted. Remains non-verbal but does appear to intermittently respond to staff. See below re: hypercapnia.   - Monitor clinically  - NG nutrition and hydration once placed  - Delirium precautions    # UTI, recurrent  Hx recurrent UTI. Urine culture from 5/1 shows pan-susceptible E.Coli. Aebrile, WBC normal. No other obvious source of infection. Repeat UA/UC negative. At this point, patient has completed adequate therapy for uncomplicated UTI. Will extend duration given ongoing encephalopathy  - Continue IV ceftriaxone, duration 7-10 days    # Compensated Respiratory Acidosis  Noted to be snoring while sleeping. No history of sleep apnea. Daughter states patient doesn't snore at baseline. Suspect may be related to decreased tongue control in setting of stroke causing obstructive picture. PCO2 53 today. Could be contributing to lethargy. Not a good candidate for BIPAP at this time given confusion and inability to protect airway.  - Repeat VBG in AM  - Consider BIPAP with sitter if PCO2 worsening     # Dysphagia  Patient failed initial swallow study. Likely in the setting of acute encephalopathy. SLP consulted and noted patient held food in mouth without any attempt to initiate swallow. Per discussion with daughter, similar symptoms in past when she had hemorrhagic stroke. Likely recrudescence vs acute stroke. NG placed on 5/9 however patient pulled out overnight. Family would like to trial NG placement again for nutrition. Discussed long term plans briefly, and unclear if they would be interested in PEG.   - Strict NPO per SLP  - Hold PO med  - NG feeding tube placement  - Nutrition consulted for tube feeds    #  Hypernatremia  Free water deficit in setting of dysphagia and NPO status. Resolved with hypotonic fluids.   - NG feeding tube placement as above  - Continue D5 1/2 NS for now (stop once TF started)  - BMP in AM    # Possible Stress Cardiomyopathy  Echo shows LVEF 50-55% with mid ventricular hypokinesis w/ preserved apical and mid segements c/w stress CM. No obvious signs of heart failure.   - Monitor     # Type 1 DM  A1c of 8.8% on 3/2025. BS in 300-400 this admission. NPO due to dysphagia. Hx of labile sugars and hypoglycemia in setting of infection. Home regimen: Lantus 18u at bedtime. Endocrine consulted to assist with insulin dosing in setting of tube feeds. Currently well controlled.  - Endocrine recommendations appreciated  - Continue Lantus 10U at bedtime   - Stop dextrose fluids once TF initiated  - MSSI q4h  - Start non-dka insulin drip if BG >250 x 2  - Hypoglycemia protocol      #Hypothyroidism  most recent TSH 0.99. Repeat TSH 0.14.  Daughter notes recent medication adjustments (brand synthroid vs generic) therefore may have been over-replaced. Cannot r/o sick euthyroid state as well.  - Hold PO synthroid 88mcg  - Continue IV levothyroxine 66mg daily  - Recheck TSH in 1-2 weeks when clinically stable     #CKD3b  Baseline Cr of 1.4-1.6. Cr improved to 1.3 with IVF. Suspect chronic dehydration contributing.  - BMP daily     #HTN  #HLD  -180 on admission. Can allow for permissive hypertension in setting of acute stroke.  - Hold carvedilol  - Hold atorvastatin, aspirin  - IV hydralazine PRN for SBP>180     # Chronic low back pain  - Diclofenac gel  - Hold duloxetine, gabapentin     # Seasonal allergies  - Hold loratidine          Diet: NPO for Medical/Clinical Reasons Except for: No Exceptions    DVT Prophylaxis: Pneumatic Compression Devices  Parker Catheter: Not present  Lines: None     Cardiac Monitoring: ACTIVE order. Indication: Stroke, acute (48 hours)  Code Status: No CPR- Do NOT Intubate       Clinically Significant Risk Factors         # Hypernatremia: Highest Na = 147 mmol/L in last 2 days, will monitor as appropriate  # Hyperchloremia: Highest Cl = 115 mmol/L in last 2 days, will monitor as appropriate              # Hypertension: Noted on problem list     # Dementia: noted on problem list            # Financial/Environmental Concerns:           Social Drivers of Health    Food Insecurity: Unknown (3/25/2025)    Food Insecurity     Within the past 12 months, did you worry that your food would run out before you got money to buy more?: Patient unable to answer     Within the past 12 months, did the food you bought just not last and you didn t have money to get more?: Patient unable to answer   Depression: At risk (4/2/2025)    PHQ-2     PHQ-2 Score: 4   Housing Stability: Unknown (3/25/2025)    Housing Stability     Do you have housing? : Patient unable to answer     Are you worried about losing your housing?: Patient unable to answer   Tobacco Use: Medium Risk (4/24/2025)    Patient History     Smoking Tobacco Use: Former     Smokeless Tobacco Use: Never   Financial Resource Strain: Unknown (3/25/2025)    Financial Resource Strain     Within the past 12 months, have you or your family members you live with been unable to get utilities (heat, electricity) when it was really needed?: Patient unable to answer   Transportation Needs: Unknown (3/25/2025)    Transportation Needs     Within the past 12 months, has lack of transportation kept you from medical appointments, getting your medicines, non-medical meetings or appointments, work, or from getting things that you need?: Patient unable to answer   Interpersonal Safety: Unknown (3/25/2025)    Interpersonal Safety     Do you feel physically and emotionally safe where you currently live?: Patient unable to answer     Within the past 12 months, have you been hit, slapped, kicked or otherwise physically hurt by someone?: Patient unable to answer      Within the past 12 months, have you been humiliated or emotionally abused in other ways by your partner or ex-partner?: Patient unable to answer          Disposition Plan     Medically Ready for Discharge: Anticipated in 5+ Days           The patient's care was discussed with the Attending Physician, Dr. Pena and Patient's Family.    Javier Johnson PA-C  Hospitalist Service, GOLD TEAM [address] [place] [address]  Securely message with [employer] (more info)  Text page via [facility] Paging/Directory   See signed in provider for up to date coverage information  ______________________________________________________________________    Interval History   Patient pulled out NG overnight despite sitter. No agitation or worsening confusion noted. Unresponsive during exam today, although nursing note patient was awake earlier today. Sitter notes that patient smiled when she mentioned coffee to her. Has been non-verbal.     Updated daughter, who was at bedside last evening. Patient seems worse to daughter since admission. More lethargic. No worsening neurologic deficits.     Physical Exam   Vital Signs: Temp: 98.1  F (36.7  C) Temp src: Oral BP: 111/85 Pulse: 89   Resp: 20 SpO2: 95 % O2 Device: None (Room air)    Weight: 123 lbs 7.32 oz    General Appearance:  Unresponsive..   Respiratory:  Normal effort. CTAB. No wheezing, rhonchi, rales.   Cardiovascular:  RRR. S1/S2. No murmurs.   GI:  Soft, non-distended, non-tender, +BS.   Extremity:  1-2+ pitting edema.  Skin:  No visible rash.   Neuro:  Grossly non-focal.  Difficult exam as patient is unresponsive.     Medical Decision Making       40 MINUTES SPENT BY ME on the date of service doing chart review, history, exam, documentation & further activities per the note.      Data     I have personally reviewed the following data over the past 24 hrs:    8.1  \   10.9 (L)   / 242     142 112 (H) 19.7 /  171 (H)   3.6 20 (L) 1.38 (H) \       Imaging  results reviewed over the past 24 hrs:   Recent Results (from the past 24 hours)   XR Abdomen Port 1 View   Result Value    Radiologist flags Enteric tube looped in the esophagus. (Urgent)    Narrative    EXAMINATION: XR ABDOMEN PORT 1 VIEW  5/9/2025 4:49 PM      CLINICAL HISTORY: post feeding tube placement    COMPARISON: CT 1/16/2025    FINDINGS:    Single AP portable view of the abdomen. Enteric tube traverses to the  distal esophagus loops and tip is above the field-of-view.  Nonobstructed bowel gas pattern. No pneumatosis or portal venous gas.      Impression    IMPRESSION:  Enteric tube coiled in the esophagus with tip above the field-of-view.  Recommend reposition.    [Urgent Result: Enteric tube looped in the esophagus.]    Finding was identified on 5/9/2025 4:51 PM.     DONOVAN Johnson was contacted by Dr. Sharif at 5/9/2025 4:53 PM and  verbalized understanding of the urgent finding.     I have personally reviewed the examination and initial interpretation  and I agree with the findings.    NATY CASTAÑEDA MD         SYSTEM ID:  [id number]   XR Abdomen 1 View    Narrative    EXAM: XR ABDOMEN 1 VIEW  LOCATION: [facility]  DATE: 5/9/2025    INDICATION: confirm feeding tube placement  COMPARISON: None.      Impression    IMPRESSION: Enteric tube tip coiled in the stomach. Visualized bowel loops are normal caliber.   XR Abdomen Port 1 View    Narrative    EXAMINATION:  XR ABDOMEN PORT 1 VIEW 5/9/2025 11:36 PM.    COMPARISON: Radiograph same day.    HISTORY:  Verify placement of NG tube    FINDINGS:   Enteric tube tip and sidehole project over the stomach. Nonspecific  bowel gas pattern in the included upper abdomen. No pneumatosis or  portal venous gas. No acute osseous abnormality. No focal  consolidation in the included lungs.      Impression    IMPRESSION:   Enteric tube tip and sidehole project over the stomach.    I have personally reviewed the examination and  initial interpretation  and I agree with the findings.    LEIDY ESPINOZA MD         SYSTEM ID:  [id number]

## 2025-05-10 NOTE — PHARMACY-CONSULT NOTE
Pharmacy Tube Feeding Consult    Medication reviewed for administration by feeding tube and for potential food/drug interactions.    Recommendation: No changes are needed at this time. Pt remains NPO with No Exceptions. NG tube placement is being planned for 5/10 for enteral feeding initiation.     Pharmacy will continue to follow as new medications are ordered.    Efrain Menchaca, LucyD, BCPS

## 2025-05-10 NOTE — CONSULTS
Care Management Initial Consult    General Information  Assessment completed with: Family, Vishal Peña  Type of CM/SW Visit: Initial Assessment    Primary Care Provider verified and updated as needed: Yes   Readmission within the last 30 days: no previous admission in last 30 days      Reason for Consult: other (see comments)  Advance Care Planning: Advance Care Planning Reviewed: questions answered          Communication Assessment  Patient's communication style: spoken language (English or Bilingual)             Cognitive  Cognitive/Neuro/Behavioral: .WDL except  Level of Consciousness: somnolent  Arousal Level: opens eyes spontaneously  Orientation: disoriented x 4  Mood/Behavior: cooperative  Best Language: 1 - Mild to moderate  Speech: spontaneous    Living Environment:   People in home: child(tiffany), adult  Vishal Peña  Current living Arrangements: apartment      Able to return to prior arrangements: yes       Family/Social Support:  Care provided by: child(tiffany)  Provides care for: no one, unable/limited ability to care for self  Marital Status:   Support system:            Description of Support System:           Current Resources:   Patient receiving home care services: Yes  Skilled Home Care Services: Skilled Nursing, Physical Therapy, Occupational Therapy     Community Resources:    Equipment currently used at home:    Supplies currently used at home:      Employment/Financial:  Employment Status: disabled        Financial Concerns:             Does the patient's insurance plan have a 3 day qualifying hospital stay waiver?  no    Will the waiver be used for post-acute placement? Yes    Lifestyle & Psychosocial Needs:  Social Drivers of Health     Food Insecurity: Unknown (3/25/2025)    Food Insecurity     Within the past 12 months, did you worry that your food would run out before you got money to buy more?: Patient unable to answer     Within the past 12 months, did the food you bought  just not last and you didn t have money to get more?: Patient unable to answer   Depression: At risk (4/2/2025)    PHQ-2     PHQ-2 Score: 4   Housing Stability: Unknown (3/25/2025)    Housing Stability     Do you have housing? : Patient unable to answer     Are you worried about losing your housing?: Patient unable to answer   Tobacco Use: Medium Risk (4/24/2025)    Patient History     Smoking Tobacco Use: Former     Smokeless Tobacco Use: Never     Passive Exposure: Not on file   Financial Resource Strain: Unknown (3/25/2025)    Financial Resource Strain     Within the past 12 months, have you or your family members you live with been unable to get utilities (heat, electricity) when it was really needed?: Patient unable to answer   Alcohol Use: Not on file   Transportation Needs: Unknown (3/25/2025)    Transportation Needs     Within the past 12 months, has lack of transportation kept you from medical appointments, getting your medicines, non-medical meetings or appointments, work, or from getting things that you need?: Patient unable to answer   Physical Activity: Not on file   Interpersonal Safety: Unknown (3/25/2025)    Interpersonal Safety     Do you feel physically and emotionally safe where you currently live?: Patient unable to answer     Within the past 12 months, have you been hit, slapped, kicked or otherwise physically hurt by someone?: Patient unable to answer     Within the past 12 months, have you been humiliated or emotionally abused in other ways by your partner or ex-partner?: Patient unable to answer   Stress: Not on file   Social Connections: Not on file   Health Literacy: Not on file       Functional Status:  Prior to admission patient needed assistance:   Dependent ADLs:: Ambulation-walker, Bathing, Dressing, Grooming, Wheelchair-with assist  Dependent IADLs:: Cleaning, Cooking, Laundry, Shopping, Meal Preparation, Medication Management, Money Management, Transportation       Mental Health  Status:          Chemical Dependency Status:                Values/Beliefs:  Spiritual, Cultural Beliefs, Anglican Practices, Values that affect care: yes               Discussed  Partnership in Safe Discharge Planning  document with patient/family: Yes: Vishal    Additional Information:  CM met with patient, introduced myself and role as a .  CM verified demographics, PCP, and emergency contacts.   Patient verified patient/ family do not have a [facility] in place.     Patient lives in an apartment with daughter Vishal Peña who is a 24/7 resource of support. Vishal provides patient with PCA services through her Elderly Waiver via [facility] for 40-hours/ week. Patient gets respite care 4-5 days/ month with other daughter Felton. She also has another PCA that comes on the weekends. There are no stairs to enter the building and no additional stairs once inside. She is described as someone who needs prompting with SBA-A1 for ADLs and IDLs + DME FWW, platform walker, WC (long distances) and shower chair. Patient was established with [facility] HC PT/OT/RN/SW-future placement support PTA. Patient is not current with any outpatient services. She does have a Critical access hospital through [facility]. Patient relies on family for transport.     Vishal asked about options if patient can't return home due to needing higher level of needs. At this time family goals are to have her at home. CM verified family hasn't officially started looking into alterative placement but [facility] CM explained that if patient were discharged from hospital to LTC, family can continue to work on alternative placement options in [place] vs. [place]. Daughter provided with LTC facility options from Medicare.gov website in both [place] and [place]. Daughter aware to call Elderly waiver CM to discuss senior living/GH process.     Anticipated Discharge: Home + HC PT/OT/RN/SW vs. LTC/ senior living/ Residential [facility]    Barriers to  Discharge: Medical readiness, clear disposition     Community Resources: [facility]ursing home/ elderly waiver placement    Transport: TBD      Next Steps: Follow for discharge needs     Danuta Feliciano RN BSN  Tianna RN Care Coordinator   Covering Unit 7C  Phone [phone]      SEARCHABLE in [facility] - search CARE COORDINATOR       [place] [address] [place] ([phone]) Saturday & Sunday; ([phone]) [facility] Recognized Holidays     Units: 5A Onc 5201 - [phone] RNCC,  5A Onc 5220 thru 5240 RNCC, 5C OFFSERVICE [phone] RNCC & 5C OFF SERVICE [phone] RNCC     Units: 6B Vocera, 6C Card 6401 thru 6420 RNCC, 6C Card 6502 thru 6514 RNCC & 6C Card 6515 thru 6519 RNCC     Units: 7A SOT RNCC Vocera, 7B Med Surg Vocera, 7C Med Surg 7401 thru 7418 RNCC & 7C Med Surg 7502 thru 7521 RNCC     Units: 6A Vocera & 4A CVICU Vocera, 4C MICU Vocera, and 4E SICU Vocera       Units: 5 Ortho Vocera & 5 Med Surg Vocera      Units: 6 Med Surg Vocera & 8 Med Surg Vocera

## 2025-05-10 NOTE — PLAN OF CARE
Goal Outcome Evaluation:      Plan of Care Reviewed With: patient    Overall Patient Progress: no changeOverall Patient Progress: no change           Shift Hours: 0700 - 1900    Assessment:  Body systems that were not at patient's baseline Cognitive/Behavioral/Neuro. Focused body system assessments documented in flowsheets.        Activity     Fall Risk Score: 95   Bed alarm on? Yes     Activity Assistance Provided: assistance, 2 people      Assistive Device Utilized: other (see comments) (chux)    Pain: No nonverbal indications of pain.      Labs/RN Managed Protocols: Blood gas drawn today for increased somnolence and intermittent desaturations while sleeping.      Lines/Drains: PIV     Nutrition: NPO, no exceptions.  Bridled NG tube.    Goal Outcome Evaluation  Plan of Care Reviewed With: patient  Overall Patient Progress: no change     Pt removed NG this morning despite bedside attendant.  Bridled NG tube placed by Jaz MARTEL, mitts on, and 1:1 in place.  Purewick with good urine output, incontinent of stool.  Repositioned q2h.  Oral cares provided.  BG monitored q4h with sliding scale insulin.  D5 1/2 NS infusing @ 75 mL/hr.  Bed alarm on for safety.  Pt seen by SLP, remains NPO.  RN to notify provider when starting tube feeding; plan to stop fluids and start sliding scale insulin.      Barriers to Discharge:   Safe discharge plan, nutrition plan, insulin regimen.

## 2025-05-10 NOTE — PROGRESS NOTES
"CLINICAL NUTRITION SERVICES - ASSESSMENT NOTE    RECOMMENDATIONS FOR MDs/PROVIDERS TO ORDER:  None at this time     Registered Dietitian Interventions:  1. Current TF regimen  Dosing Weight: 56 kg (actual weight)  EN access: NG (awaiting replacement)  - HOB > 30 degrees       Duration: continuous  Regimen: Osmolite 1.5 (or equivalent) at rate of 40 mL/hr to provide: 960 mL formula, 1440 kcal (25 Kcal/kg), 60 gm pro (1.08 gm/kg), 195 gm CHO, 0 gm fiber, and 731 mL free water daily   Flushes: 90 mL Q3hrs (FWF + water in formula = 1451 mL fluid/day)  Titration:  - Initiate at 10 mL/hr and advance by 10 mL Q8hrs as tolerated   - Do not advance TF rate unless Mg++ >1.5, K+ is >3, and phos >1.9    Future/Additional Recommendations:  -Monitor TF tolerance/adequacy  -Monitor weight trends  -Monitor labs     REASON FOR ASSESSMENT  Provider order - Registered Dietitian to order TF per Medical Nutrition Therapy Guidelines    INFORMATION OBTAINED  Patient not available for interview due to pt sleeping deeply. Spoke with bedside NST.    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 SLP completed swallow eval on 5/8- recommended NPO status. NG tube placed 5/9 for initiation of TF's (per notes, pt pulled out initial tube, was replaced shortly after). No family at bedside today, pt sleeping very deeply. Bedside NST reports pt pulled out her NG tube this morning, per BULMARO, plan to replace to start TF's.       CURRENT NUTRITION ORDERS  Diet: NPO      CURRENT INTAKE/TOLERANCE  N/a- NPO    LABS  Nutrition-relevant labs: Reviewed   Most Recent   Sodium 147 (H)  5/9/25 07:52   Urea Nitrogen 32 (H)  7/26/22 17:03 "   Urea Nitrogen 30.7 (H)  5/9/25 07:52   Creatinine 1.68 (H)  5/9/25 07:52   GFR Estimate 33 (L)  5/9/25 07:52   Hemoglobin A1C 8.8 (H)  3/23/25 10:17   (H): Data is abnormally high  (L): Data is abnormally low    MEDICATIONS  Nutrition-relevant medications: Reviewed  Novolog, Lantus, Levothroyxine  D5 and 0.45% NaCl @ 75 mL/hr    ANTHROPOMETRICS  Height: 160 cm (5' 2.99)- 4/24 height   Admission Weight: none available    Most Recent Weight: 56 kg (123 lb 7.3 oz)  IBW: 52.3 kg (107%)  BMI: 21.88  Weight History:   Wt Readings from Last 10 Encounters:   05/10/25 56 kg (123 lb 7.3 oz)   03/27/25 62.4 kg (137 lb 9.1 oz)   02/13/25 60 kg (132 lb 3.2 oz)   01/20/25 60.9 kg (134 lb 4.2 oz)   10/15/24 58.5 kg (129 lb)   10/07/24 58.9 kg (129 lb 14.4 oz)   07/08/24 62.1 kg (137 lb)   03/07/24 61.2 kg (135 lb)   01/29/24 60 kg (132 lb 4.8 oz)   09/04/23 59.7 kg (131 lb 9.8 oz)     Dosing Weight: 56 kg, based on actual wt    ASSESSED NUTRITION NEEDS  Estimated Energy Needs: 6029-6840 kcals/day (25-30 kcals/kg)  Justification: maintenance  Estimated Protein Needs: 45-56 grams protein/day (0.8 - 1 grams of pro/kg)  Justification: CKD  Estimated Fluid Needs: 1 mL/kcal   Justification: Maintenance    SYSTEM AND PHYSICAL FINDINGS    GI symptoms: Reviewed, no BM's recorded so far this admission  Skin/wounds: Reviewed    MALNUTRITION  % Intake: Unable to assess  % Weight Loss: Weight loss does not meet criteria   Subcutaneous Fat Loss: Unable to assess  Muscle Loss: Unable to assess  Fluid Accumulation/Edema: None noted  Malnutrition Diagnosis: Unable to determine due to no nutrition hx/NFPE today  Malnutrition Present on Admission: Unable to assess    NUTRITION DIAGNOSIS  Predicted inadequate nutrient intake (kcals/protein) related to anticipated poor appetite as evidenced by chart review    INTERVENTIONS  Enteral nutrition management- see above    GOALS  Total avg nutritional intake to meet a minimum of 25 kcal/kg and 0.8 g PRO/kg  daily (per dosing wt 56 kg).     MONITORING/EVALUATION  Progress toward goals will be monitored and evaluated per policy.    Megan Avila RD (Maggie), LD- 5C Clinical Dietitian   Available on [employer]  No longer available by paging

## 2025-05-10 NOTE — PLAN OF CARE
Goal Outcome Evaluation:    Plan of Care Reviewed With: patient    Overall Patient Progress: no change  Overall Patient Progress: no change    Assumed cares from [id number]. Pt is disoriented x4. VSS on RA. NG placement completed. R PIV infusing D5 1/2NS @ 75 mL/hr. No signs of pain. Purewick in place. Daughter at bedside for beginning of shift. 2 RN skin check completed. Sacral mepi changed and intact. 1:1 attendant at bedside for line pulling.

## 2025-05-10 NOTE — SUMMARY OF CARE
Admitted/transferred from: ED  2 RN full   skin assessment completed by Fernanda Balderas, RN and Michelle BURNS RN.  Skin assessment finding: issues found : generalized bruising, blanchable redness on bilateral heels & sacrum (mepi in place), petechiae on chest, R ear circular irregularity (extra skin)   Interventions/actions: skin interventions N/A     Will continue to monitor.

## 2025-05-10 NOTE — CONSULTS
Inpatient Diabetes Management Service : New Consult Note     Patient: Isabell Matthews   YOB: 1960    MRN: [id number]     Date of Consult : 05/10/2025   Date of Admission: 5/7/2025     Reason for Consult: Diabetic with history of labile blood sugars.  Admitted with possible stroke vs encephalopathy.  Unable to take PO.  Starting TF.  Assistance with insulin dosin    Consult Requestor: Javier Johnson PA-C          History of Present Illness:   Isabell Matthews is a 64 year old female with history of HTN, HLD, DM type I, diabetic neuropathy, CKD3b, orthostatic hypotension, seizures, vascular dementia, hypothyroidism, recurrent UTI's, and freuqent falls who was admitted to [facility] with concern for stroke after presenting with acute neurologic symptoms, MRI with acute infarcts within R corona radiata, precentral gyrus and operculum, though presenting symptoms do not completely explain focal deficits and not a candidate for advanced therapies. Hospital course complicated by dysphagia and now NPO per SLP with plan to start tube feedings. IDS consulted for assistance with glycemic management.     History obtained via the patient, chart review and discussion with primary team.       Additional Historian:  none available     Patient is known to the Inpatient Diabetes Service from past admission(s).    Last dose insulin PTA: unknown, daughter not available    Current inpatient regimen:  Lanuts 10 units at HS, Novolog Correction 1:50 every 4 hours NPO     BG at time of consult: 152  Planned Procedures/Surgeries: Tube feed placement     Relevant Labs on Admission:  if contributory, otherwise see labs below   Latest Reference Range & Units 05/07/25 13:29   Sodium 135 - 145 mmol/L 138   Potassium 3.4 - 5.3 mmol/L 4.9   Chloride 98 - 107 mmol/L 106   Carbon Dioxide (CO2) 22 - 29 mmol/L 19 (L)   Urea Nitrogen 8.0 - 23.0 mg/dL 37.2 (H)   Creatinine 0.51 - 0.95 mg/dL 1.59 (H)   GFR Estimate >60 mL/min/1.73m2 35 (L)    Calcium 8.8 - 10.4 mg/dL 8.9   Anion Gap 7 - 15 mmol/L 13   Albumin 3.5 - 5.2 g/dL 3.7   Protein Total 6.4 - 8.3 g/dL 6.3 (L)   Alkaline Phosphatase 40 - 150 U/L 208 (H)   ALT 0 - 50 U/L 22   AST 0 - 45 U/L 28   Bilirubin Total <=1.2 mg/dL 0.3   Glucose 70 - 99 mg/dL 413 (H)   Troponin T, High Sensitivity <=14 ng/L 50 (H)   (L): Data is abnormally low  (H): Data is abnormally high     Latest Reference Range & Units 05/07/25 14:17   FIO2  21   Ph Venous 7.32 - 7.43  7.33   PCO2 Venous 40 - 50 mm Hg 42   PO2 Venous 25 - 47 mm Hg 27   O2 Sat, Venous 70.0 - 75.0 % 43.6 (L)   Bicarbonate Venous 21 - 28 mmol/L 22   Base Excess Venous -3.0 - 3.0 mmol/L -4.0 (L)   Oxyhemoglobin Venous 70 - 75 % 43 (L)   (L): Data is abnormally low    Inpatient Glucose Trends:           Diabetes History:   Diabetes Type and Duration: DM for > 40 years, diagnosed in her 40s. Per daughter,  initially told she has T2DM then T1DM for a while then flipped back to being told T2DM.      6/17/2024 positive GAD65 antibody and c-peptide <0.1 but BG elevated    PTA Medication Regimen:   From discharge 3/30/25:- daughter not present to confirm   - Lantus 18 units once daily at HS  - Lispro ICR 1:15 TID AC, 1:20 PRN with snacks/supplements  - Lispro correction 1:50>150 TID AC, >200 HS    At discharge had discussed challenges around glucose lability and dosing: Lantus dose of 18 units may be too high if patient is not eating. However, daughter notes that with more dramatic changes patient will sometimes rise to 400s and has been in DKA before which is hard to balance. If she is concerned about mom going low at night, will try to give her a snack to support Lantus dosing. She is working to resume CGM to more closely monitor blood sugars.  Missing doses?: unknown, daughter not present to confirm   Historical Diabetes Medications: various insulins and dosing     Glucose monitoring device/frequency/trends: Historically has used glucometer before meals and  at bedtime. Has tried and failed multiple CGMs (issues with accuracy or patient picking CGM off).   Current trends unknown as daughter not present to discuss- historically known to be labile with needs dropping dramatically when NPO    Hypoglycemia PTA:   - Frequency: currently unknown, but historically labile.   - Severity: + history of severe unconscious lows   - Awareness: variable, not intact    - Treatment: candy, juice      Outpatient Diabetes Provider: Current PCP: Dr Kellogg ( LOV 4/24/25) ; seen by Dr Bony Castaneda in the past (LOV 10/7/24)   Endo referral placed by PCP and IDS last admission, Scheduled with Mai Figueroa PAC 8/4/25   Formal Diabetes Education/Educator: none recently     ------------------------  Complications:  + peripheral neuropathy on gabapentin, no known retinopathy (last eye exam: >4 years; next scheduled 4/1/25), + nephropathy (CKD), denies gastroparesis, + macrovascular disease (hx of MI and multiple strokes)     Most recent A1c: 8.8  (3/23/5)   Hemoglobin at time of most recent A1c: 10.5  RBC transfusion 3 months prior to most recent A1c:  none      History of DKA: yes: 2011, 2020 and 2022      Safety Plan:   - Glucagon: + Gvoke   - Ketone Strips: none in the past     Diet/Lifestyle/Living Situation: Changes unknown: Prior admissions known to attend a day program. Otherwise with daughter who cooks for her     Ability to [place] Prescribed Regimen: Daughter assists with all dosing and injections   Food/Housing Insecurity: no concerns           Medications Impacting Glycemia:    Steroids: none  D5W containing solutions/medications: D5 1/2 NS at 75 ml/hr while NPO   Other medications impacting glucose: none         Diet/Nutrition:    Orders Placed This Encounter      NPO for Medical/Clinical Reasons Except for: No Exceptions     Supplements:  none  TF:   5/10: will start Osmolite 1.5 (or equivalent) at 10 ml.hr to advance by 10ml every 8 hours (goal 40 ml/hr provides 195 CHO)    TPN: none          Review of Systems:    CC: patient unable to participate in ROS          Past Medical History:       Past Medical History:   Diagnosis Date    Chronic pain     Coronary atherosclerosis 2016    Essential hypertension     Ex-cigarette smoker     quit 2018 over 35 years    Ex-cigarette smoker     Hyperlipidemia     Hypothyroid     Seizures (H)     Stroke (H)     Vascular dementia (H)              Past Surgical History:      Past Surgical History:   Procedure Laterality Date    athroplasty hip Bilateral     ,     CATARACT IOL, RT/LT      OTHER SURGICAL HISTORY      athroplasty hip    PICC DOUBLE LUMEN PLACEMENT Right 2023    right basilic 5 fr dl power picc 39 cm    STENT               Social History:      Social History     Tobacco Use    Smoking status: Former     Current packs/day: 0.00     Average packs/day: 0.5 packs/day for 35.0 years (17.5 ttl pk-yrs)     Types: Cigarettes     Start date: 1983     Quit date: 2018     Years since quittin.8    Smokeless tobacco: Never   Substance Use Topics    Alcohol use: Not Currently        History   Sexual Activity    Sexual activity: Not Currently             Family History:      Family History   Problem Relation Age of Onset    Acute Myocardial Infarction Father     Heart Disease Maternal Grandmother     Dementia Maternal Grandmother     Myocardial Infarction Father     Dementia Paternal Grandmother     Heart Disease Paternal Grandmother             Physical Exam:    /85 (BP Location: Left arm)   Pulse 89   Temp 98.1  F (36.7  C) (Oral)   Resp 20   SpO2 95%    General Appearance: Confused, lethargic, but calm   HEENT: Normocephalic, atraumatic.   Lungs:  Breathing comfortably   Abdomen: Round, nondistended.   Extremities:  No significant  lower extremity edema.   Skin: No obvious rashes, lesions or bruising.   Neuro: disoriented x4 at baseline    Psych:  Calm,         Laboratory Data:      Lab Results   Component  Value Date    A1C 8.8 (H) 03/23/2025    A1C 9.6 (H) 01/16/2025    A1C 11.2 (H) 09/23/2024    A1C 10.2 (H) 05/20/2024    A1C 10.7 (H) 01/29/2024    A1C 7.8 (H) 06/21/2021    A1C 11.7 (H) 09/27/2020     Recent Labs   Lab Test 05/09/25  0752   WBC 8.4   RBC 3.63*   HGB 9.9*   HCT 31.0*   MCV 85   MCH 27.3   MCHC 31.9   RDW 15.7*        Recent Labs   Lab Test 05/10/25  0817 05/10/25  0403 05/09/25  1222 05/09/25  0752 05/08/25  0803 05/08/25  0646   NA  --   --   --  147*  --  143   POTASSIUM  --   --   --  3.9  --  4.2   CHLORIDE  --   --   --  115*  --  110*   CO2  --   --   --  20*  --  18*   ANIONGAP  --   --   --  12  --  15   * 203*   < > 77   < > 243*   BUN  --   --   --  30.7*  --  34.4*   CR  --   --   --  1.68*  --  1.60*   VERONICA  --   --   --  8.9  --  9.0    < > = values in this interval not displayed.     Recent Labs   Lab Test 05/07/25  1329   PROTTOTAL 6.3*   ALBUMIN 3.7   BILITOTAL 0.3   ALKPHOS 208*   AST 28   ALT 22            Assessment and Recommendations:       Assessment:   Type 1 Diabetes Mellitus with DEMETRIA elevated at 6.2 and C-peptide=<0.1 on 6/17/2024, complicated by peripheral neuropathy, nephropathy, hx of DKA and hypoglycemia. Sub-optimal control. (A1c 8.8 % 3/23/25, Hgb: 10.5) goal A1c more appropriately 7.5%.  Vascular dementia with acute neurologic changes and new dysphagia- daughter is caregiver and manages insulin/medications   Tube feedings     Plan/Recommendations:    - Lantus 10 units q 24 hrs at 2200   - NO Novolog Meal Coverage: Patient strict NPO   - Novolog Correction Scale: Medium resistance (ISF: 50)  every 4 hours  - BG monitoring: every 4 hours   - Continue Dextrose fluids while NPO- stop once tube feeds are started.   - Hypoglycemia protocol    - Carb counting protocol     Per ADA guidelines, treatment goals and recommendations for older adults with DM and medically complexity is less stringent BG control/A1c for safety - targets of 110-180 mg/dL and up to 250  mg/dL reasonable.  Avoid reliance on A1c.  Glucose decisions should be based on avoidance of hypoglycemia and symptomatic hyperglycemia.      Discussion:     Glucoses elevated on admission to 400s, unclear when last took Lantus but typically takes in the evenings, so may have missed dosing on 5/7, fortunately no evidence of acidosis or AG on labs. Lantus resumed in PM of 5/8, with glucoses stabilizing. D5 started at 1000 on 5/9 given NPO status and borderline low BGs to the 70s with restart of Lantus. Plan to start tube feeds yesterday evening however patient pulled NG tube x2. Currently without enteral access. Primary team planning to discuss situation with daughter given patient's multiple removals of NG tube.    Given unknown time of tube feed start, will hold on ordering additional insulin. If feeds start this evening, recommend stopping dextrose fluids and monitoring glycemic trends with correction scale alone while feeds at a low rate. If BG >250 x2 could start insulin drip for further control and dose finding.     will start Osmolite 1.5 (or equivalent) at 10 ml.hr to advance by 10ml every 8 hours (goal 40 ml/hr provides 195 CHO)     Tube Feed Advancement: Osmolite 1.5 (or equivalent)  10 ml/hr provides 2.03 g CHO/hr   20 ml/hr provides 4.06 g CHO/hr   30 ml/hr provides 6.09 g CHO/hr   Goal: 40 ml/hr provides 8.125 g CHO/hr     Discharge Planning: (tentative)    Medications: TBD  - adjustment of PTA dosing with addition of NPH to cover tube feeds as indicated   Needs set doses for day program, would be able to use ICR at home.   Test Claims:  none needed.   Education:  Needs to be assessed closer to discharge.    Outpatient Follow-up: PCP- Dr Kellogg ( LOV 4/24/25), until able to establish with [facility]- has appointment with Mai Figueroa 8/4/25      Thank you for this consult. IDS will continue to follow.      Please notify Inpatient Diabetes Service if changes are planned to steroids,  nutrition, TPN/TF and anticipated procedures requiring prolonged NPO status.     Richard Bonner APRN, CNP   Inpatient Diabetes Management Service   Pager: [phone]   Available on [employer]      To contact Inpatient Diabetes Service:     7 AM - 5 PM: Page the IDS BULMARO following the patient that day (see filed or incomplete progress notes/consult notes under Endocrinology)    OR if uncertain of provider assignment: page job code [phone]    5 PM - 7 AM: First call after hours is to primary service.    For urgent after-hours questions, page job code for on call fellow: [phone]     I spent a total of 90 minutes on the date of the encounter doing prep/post-work, chart review, history and exam, documentation and further activities per the note including lab review, multidisciplinary communication, counseling the patient and/or coordinating care regarding acute hyper/hypoglycemic management, as well as discharge management and planning/communication.  See note for details.

## 2025-05-10 NOTE — INTERIM SUMMARY
Brief Neurology Note:    Isabell Matthews is a 64 year old woman with historyof HTN, HLD, CKD IIIb, T1DM c/b neuropathy, ischemic and hemorrhagic strokes (right basal ganglia ischemic infarct in 2018, ICAD left basal ganglia hemorrhagic stroke 2021, right centrum semiovale & right chuck 2023) with residual R facial droop and vascular dementia, seizures 2/2 DKA, and recurrent UTI who is currently admitted with encephalopathy, FTT, and slight worsening of baseline stroke symptoms. She was found to have multiple small acute infarcts involving the right frontal lobe near her chronic encephalomalacia from prior stroke, which may partially explain her slight worsening of baseline symptoms.     We opted to complete a full stroke workup. Treatment is c/b ongoing dysphagia and continued NPO status. Due to her ICAD, she should ideally be on DAPT x90 days once able to take PO medications. She should stay on aspirin SC 300mg daily until diet is advanced, then should be escalated to aspirin 325mg daily indefinitely and plavix 75mg daily x90 days. As the patient is continuing to be intolerant of PO intake, would hold off on DAPT if PEG placement is being considered. If the NG is temporary, would start DAPT as described above. Neurology will sign off, but remains available should any additional questions or concerns present themselves.     # Acute infarcts within right corona radiata, precentral gyrus, and operculum   # Acute encephalopathy, likely multifactorial (dementia, active UTI, many vascular risk factors, prior strokes, seizure history)  # Dysphagia  - A1c is 8.8, goal < 7.0              - PCP follow up for better blood sugar control  - LDL is 60, goal < 70              - Continue PTA atorvastatin 40mg when safe from SLP standpoint  - Continue aspirin 300mg SC pending diet evaluation  - When diet is advanced (or feeding tube placed):  - Switch to aspirin 325mg daily indefinitiely              - Start plavix 75mg daily x90  days              - If pt will require PEG, hold off on plavix until after this procedure  - Cardiac monitor while hospitalized, Zio patch x14 days at discharge  - Continue PTA keppra 500mg BID  - Stroke follow up in 8 weeks    Vascular neurology will sign off.    Yony Adam MD  Neurology Resident, PGY-2  05/10/2025 11:47 AM

## 2025-05-11 ENCOUNTER — APPOINTMENT (OUTPATIENT)
Dept: GENERAL RADIOLOGY | Facility: CLINIC | Age: 65
DRG: 064 | End: 2025-05-11
Attending: PHYSICIAN ASSISTANT
Payer: COMMERCIAL

## 2025-05-11 ENCOUNTER — APPOINTMENT (OUTPATIENT)
Dept: SPEECH THERAPY | Facility: CLINIC | Age: 65
DRG: 064 | End: 2025-05-11
Payer: COMMERCIAL

## 2025-05-11 ENCOUNTER — APPOINTMENT (OUTPATIENT)
Dept: GENERAL RADIOLOGY | Facility: CLINIC | Age: 65
End: 2025-05-11
Attending: PHYSICIAN ASSISTANT
Payer: COMMERCIAL

## 2025-05-11 LAB
ALBUMIN SERPL BCG-MCNC: 3.3 G/DL (ref 3.5–5.2)
ALP SERPL-CCNC: 209 U/L (ref 40–150)
ALT SERPL W P-5'-P-CCNC: 16 U/L (ref 0–50)
ANION GAP SERPL CALCULATED.3IONS-SCNC: 9 MMOL/L (ref 7–15)
AST SERPL W P-5'-P-CCNC: 26 U/L (ref 0–45)
BASE EXCESS BLDV CALC-SCNC: 0.7 MMOL/L (ref -3–3)
BASOPHILS # BLD AUTO: 0 10E3/UL (ref 0–0.2)
BASOPHILS NFR BLD AUTO: 0 %
BILIRUB SERPL-MCNC: 0.3 MG/DL
BUN SERPL-MCNC: 11.8 MG/DL (ref 8–23)
CALCIUM SERPL-MCNC: 8.8 MG/DL (ref 8.8–10.4)
CHLORIDE SERPL-SCNC: 109 MMOL/L (ref 98–107)
CREAT SERPL-MCNC: 1.21 MG/DL (ref 0.51–0.95)
EGFRCR SERPLBLD CKD-EPI 2021: 49 ML/MIN/1.73M2
EOSINOPHIL # BLD AUTO: 0.2 10E3/UL (ref 0–0.7)
EOSINOPHIL NFR BLD AUTO: 3 %
ERYTHROCYTE [DISTWIDTH] IN BLOOD BY AUTOMATED COUNT: 15 % (ref 10–15)
GLUCOSE BLDC GLUCOMTR-MCNC: 153 MG/DL (ref 70–99)
GLUCOSE BLDC GLUCOMTR-MCNC: 162 MG/DL (ref 70–99)
GLUCOSE BLDC GLUCOMTR-MCNC: 254 MG/DL (ref 70–99)
GLUCOSE BLDC GLUCOMTR-MCNC: 265 MG/DL (ref 70–99)
GLUCOSE BLDC GLUCOMTR-MCNC: 268 MG/DL (ref 70–99)
GLUCOSE SERPL-MCNC: 161 MG/DL (ref 70–99)
HCO3 BLDV-SCNC: 25 MMOL/L (ref 21–28)
HCO3 SERPL-SCNC: 22 MMOL/L (ref 22–29)
HCT VFR BLD AUTO: 35.9 % (ref 35–47)
HGB BLD-MCNC: 11.2 G/DL (ref 11.7–15.7)
IMM GRANULOCYTES # BLD: 0 10E3/UL
IMM GRANULOCYTES NFR BLD: 0 %
LYMPHOCYTES # BLD AUTO: 1.5 10E3/UL (ref 0.8–5.3)
LYMPHOCYTES NFR BLD AUTO: 21 %
MAGNESIUM SERPL-MCNC: 1.6 MG/DL (ref 1.7–2.3)
MCH RBC QN AUTO: 27.1 PG (ref 26.5–33)
MCHC RBC AUTO-ENTMCNC: 31.2 G/DL (ref 31.5–36.5)
MCV RBC AUTO: 87 FL (ref 78–100)
MONOCYTES # BLD AUTO: 0.5 10E3/UL (ref 0–1.3)
MONOCYTES NFR BLD AUTO: 6 %
NEUTROPHILS # BLD AUTO: 5 10E3/UL (ref 1.6–8.3)
NEUTROPHILS NFR BLD AUTO: 70 %
NRBC # BLD AUTO: 0 10E3/UL
NRBC BLD AUTO-RTO: 0 /100
O2/TOTAL GAS SETTING VFR VENT: 0 %
OXYHGB MFR BLDV: 61 % (ref 70–75)
PCO2 BLDV: 39 MM HG (ref 40–50)
PH BLDV: 7.42 [PH] (ref 7.32–7.43)
PHOSPHATE SERPL-MCNC: 2.8 MG/DL (ref 2.5–4.5)
PLATELET # BLD AUTO: 220 10E3/UL (ref 150–450)
PO2 BLDV: 34 MM HG (ref 25–47)
POTASSIUM SERPL-SCNC: 3.6 MMOL/L (ref 3.4–5.3)
PROT SERPL-MCNC: 6 G/DL (ref 6.4–8.3)
RBC # BLD AUTO: 4.14 10E6/UL (ref 3.8–5.2)
SAO2 % BLDV: 62.4 % (ref 70–75)
SODIUM SERPL-SCNC: 140 MMOL/L (ref 135–145)
WBC # BLD AUTO: 7.2 10E3/UL (ref 4–11)

## 2025-05-11 PROCEDURE — 92526 ORAL FUNCTION THERAPY: CPT | Mod: GN

## 2025-05-11 PROCEDURE — 99233 SBSQ HOSP IP/OBS HIGH 50: CPT | Mod: 24

## 2025-05-11 PROCEDURE — 83735 ASSAY OF MAGNESIUM: CPT | Performed by: PHYSICIAN ASSISTANT

## 2025-05-11 PROCEDURE — 250N000009 HC RX 250: Mod: JZ | Performed by: STUDENT IN AN ORGANIZED HEALTH CARE EDUCATION/TRAINING PROGRAM

## 2025-05-11 PROCEDURE — 258N000003 HC RX IP 258 OP 636

## 2025-05-11 PROCEDURE — 74018 RADEX ABDOMEN 1 VIEW: CPT | Mod: 26 | Performed by: RADIOLOGY

## 2025-05-11 PROCEDURE — 250N000011 HC RX IP 250 OP 636: Mod: JZ

## 2025-05-11 PROCEDURE — 85004 AUTOMATED DIFF WBC COUNT: CPT | Performed by: PHYSICIAN ASSISTANT

## 2025-05-11 PROCEDURE — 82805 BLOOD GASES W/O2 SATURATION: CPT | Performed by: PHYSICIAN ASSISTANT

## 2025-05-11 PROCEDURE — 250N000013 HC RX MED GY IP 250 OP 250 PS 637: Performed by: PHYSICIAN ASSISTANT

## 2025-05-11 PROCEDURE — 99233 SBSQ HOSP IP/OBS HIGH 50: CPT | Mod: FS | Performed by: STUDENT IN AN ORGANIZED HEALTH CARE EDUCATION/TRAINING PROGRAM

## 2025-05-11 PROCEDURE — 36415 COLL VENOUS BLD VENIPUNCTURE: CPT | Performed by: PHYSICIAN ASSISTANT

## 2025-05-11 PROCEDURE — 84075 ASSAY ALKALINE PHOSPHATASE: CPT | Performed by: PHYSICIAN ASSISTANT

## 2025-05-11 PROCEDURE — 120N000002 HC R&B MED SURG/OB UMMC

## 2025-05-11 PROCEDURE — 999N000065 XR ABDOMEN PORT 1 VIEW

## 2025-05-11 PROCEDURE — 84100 ASSAY OF PHOSPHORUS: CPT | Performed by: PHYSICIAN ASSISTANT

## 2025-05-11 PROCEDURE — 82310 ASSAY OF CALCIUM: CPT | Performed by: PHYSICIAN ASSISTANT

## 2025-05-11 PROCEDURE — 74018 RADEX ABDOMEN 1 VIEW: CPT

## 2025-05-11 RX ADMIN — DICLOFENAC SODIUM 2 G: 10 GEL TOPICAL at 08:59

## 2025-05-11 RX ADMIN — DICLOFENAC SODIUM 2 G: 10 GEL TOPICAL at 20:11

## 2025-05-11 RX ADMIN — LEVOTHYROXINE SODIUM 66 MCG: 20 INJECTION, SOLUTION INTRAVENOUS at 08:59

## 2025-05-11 RX ADMIN — LEVETIRACETAM 500 MG: 5 INJECTION INTRAVENOUS at 18:01

## 2025-05-11 RX ADMIN — DEXTROSE AND SODIUM CHLORIDE: 5; 450 INJECTION, SOLUTION INTRAVENOUS at 02:19

## 2025-05-11 RX ADMIN — LEVETIRACETAM 500 MG: 5 INJECTION INTRAVENOUS at 05:48

## 2025-05-11 RX ADMIN — ASPIRIN 300 MG: 300 SUPPOSITORY RECTAL at 09:03

## 2025-05-11 RX ADMIN — INSULIN GLARGINE 10 UNITS: 100 INJECTION, SOLUTION SUBCUTANEOUS at 22:22

## 2025-05-11 ASSESSMENT — ACTIVITIES OF DAILY LIVING (ADL)
ADLS_ACUITY_SCORE: 85
ADLS_ACUITY_SCORE: 85
ADLS_ACUITY_SCORE: 82
ADLS_ACUITY_SCORE: 85
ADLS_ACUITY_SCORE: 81
ADLS_ACUITY_SCORE: 81
ADLS_ACUITY_SCORE: 85
ADLS_ACUITY_SCORE: 81
ADLS_ACUITY_SCORE: 85
ADLS_ACUITY_SCORE: 81
ADLS_ACUITY_SCORE: 85
ADLS_ACUITY_SCORE: 81
ADLS_ACUITY_SCORE: 81
ADLS_ACUITY_SCORE: 85
ADLS_ACUITY_SCORE: 85
ADLS_ACUITY_SCORE: 81
ADLS_ACUITY_SCORE: 86

## 2025-05-11 NOTE — PROGRESS NOTES
Brief Medicine Progress Note  May 10, 2025     Pt had NG tube placed, XR w/ projection into stomach w/ a loop of the tube. RN attempted to flush NG prior to starting TF, but was unable to do so. Hence, resumed dextrose (held earlier this evening when I though TF were starting) for overnight.     Can attempt to flush and start TF in AM.       Alexandre Overton PA-C  [facility] Hospitalist Service  Securely message with [employer] (more info)  Text page via [facility] Paging/Directory

## 2025-05-11 NOTE — PROGRESS NOTES
Small Bowel Feeding Tube Placement Assessment  Reason for Feeding Tube Placement: small bore enteral access, gastric position acceptable  Cortrak Start Time: 1500   Cortrak End Time: 1530  Medicine Delivered During Procedure: lubricating gel  Placement Successful: yes per Cortrak tracing pending AXR confirmation      Procedure Complications: patient mildly agitated during placement and difficulty advancing tube past nasal cavity. Left nasal cavity unable to receive NG tube.  Final Placement Haim at exit of nare:  90 cm  Face to Face time with patient: 20 minutes    Bridle Placement:   Reason for bridle placement: securement of nasoenteric feeding tube    Medicine delivered during procedure: lubricating jelly  Procedure: Successful   Location of top of clip on FT: @ 91 cm marker   Condition of nose/skin at time of bridle placement: Unremarkable   Face to Face time with patient: < 5 minutes.      NG tube did flush easily with placement. On xray, NG tube found to have coiled in the stomach. NG tube was retracted with subsequent xray to confirm placement. NG tube remained clamped and flushed without difficulty.

## 2025-05-11 NOTE — PROGRESS NOTES
[facility]    Medicine Progress Note - Hospitalist Service, GOLD TEAM 6    Date of Admission:  5/7/2025    Assessment & Plan   Isabell Matthews is a 64 year old female with history of HTN, HLD, DM type I, diabetic neuropathy, CKD3b, orthostatic hypotension, seizures, vascular dementia, hypothyroidism, recurrent UTI's, and freuqent falls who was admitted to [facility] with concern for stroke after presenting with acute neurologic symptoms.    Updates for 5/11/2025:  - SLP to reassess swallowing  - Continue NPO status for now  - Continue TF via NJ tube  - Ok for home wrist brace        # Acute infarcts within right corona radiata, precentral gyrus, and operculum   # Hx multiple prior CVAs  # Hx of seizures  Presents with new findings of slurred speech, dysphagia, decreased responsiveness, confusion. No new focal neurological deficits noted on exam. Head CT negative for acute infarct or hemorrhage. CTA with multifocal stenosis involving left RADHA (A2) and right MCA which has progressed since 1/16. Neuro Stroke Team consulted. Loaded with keppra on arrival. EEG negative. ? Recrudescence vs new infarcts. MRI showed acute infarcts within the R corona radiata, precentral gyrus, and operculum. Not a candidate for advanced therapies. No new focal deficits. Ongoing dysarthria. Clinically stable from stroke perspective.  - Neuro stroke team following  - Q4h neuro checks  - NPO per SLP  - Continue ASA 300mg IL for now  - Once able to take PO, switch to DAPT with ASA + Plavix x 90 days   - Atorvastatin 40mg daily (currently held while NPO)  - Keppra 750mg IV q12h  - Monitor on telemetry for arrhythmia  - Will need Ziopatch x 14 days upon discharge  - Stroke follow up in 8 weeks    # Acute metabolic encephalopathy, likely multifactorial   # Hx of vascular dementia  Acute change in status possibly secondary to acute stroke vs encephalopathy.  Likely multifactorial with UTI, dehydration,  hyperglycemia, hypercapnia, and hospital environment contributing. No significant electrolyte abnormalities. No suspicious meds. TSH 0.14.   Significant improvement noted today. Fully awake and alert. Appears to be comprehending questions and responsive to commands. Still with slurred speech d/t stroke.   - Monitor clinically  - Continue bedside sitter to ensure no pulling of NJT  - Delirium precautions    # UTI, recurrent  Urine culture from 5/1 shows pan-susceptible E.Coli. Aebrile, WBC normal. No other obvious source of infection. Repeat UA/UC negative. At this point, patient has completed adequate therapy for uncomplicated UTI. Will extend duration given ongoing encephalopathy  - Continue IV ceftriaxone, duration 7 days     # Dysphagia  Patient failed initial swallow study. Likely in the setting of acute encephalopathy and acute stroke. SLP consulted, recommended strict NPO. NG placed 5/10 however has not yet started TF. More awake and alert today. Still having slurred speech so likely unable to swallow.  - Strict NPO   - Will have SLP reassess  - Hold PO meds  - Starting NJ tube feeds per nutrition recs    # Hypernatremia, resolved  Free water deficit in setting of dysphagia and NPO status. Resolved with hypotonic fluids.   - BMP in AM    # Possible Stress Cardiomyopathy  Echo shows LVEF 50-55% with mid ventricular hypokinesis w/ preserved apical and mid segements c/w stress CM. No obvious signs of heart failure.   - Monitor     # Type 1 DM  A1c of 8.8% on 3/2025. BS in 300-400 this admission. NPO due to dysphagia. Hx of labile sugars and hypoglycemia in setting of infection. Home regimen: Lantus 18u at bedtime. Endocrine consulted to assist with insulin dosing in setting of tube feeds.  - Continue Lantus 10U at bedtime   - NPH 4 units AM  - NPH 8 units PM  - MSSI q4h  - Hypoglycemia protocol      #Hypothyroidism  most recent TSH 0.99. Repeat TSH 0.14.  Daughter notes recent medication adjustments (brand  synthroid vs generic) therefore may have been over-replaced. Cannot r/o sick euthyroid state as well.  - Hold PO synthroid 88mcg  - Continue IV levothyroxine 66mg daily  - Recheck TSH in 1-2 weeks when clinically stable     #CKD3b  Baseline Cr of 1.4-1.6. Cr improved to 1.3 with IVF. Suspect chronic dehydration contributing.  - BMP daily     #HTN  #HLD  -180 on admission. Can allow for permissive hypertension in setting of acute stroke.  - Hold carvedilol  - Hold atorvastatin, aspirin  - IV hydralazine PRN for SBP>180     # Chronic low back pain  - Diclofenac gel  - Hold duloxetine, gabapentin     # Seasonal allergies  - Hold loratidine          Diet: NPO for Medical/Clinical Reasons Except for: No Exceptions  Adult Formula Drip Feeding: Continuous Osmolite 1.5; Nasogastric tube; Goal Rate: 40; mL/hr; Initiate at 10ml/hr and advance by 10ml Q8H as tolerated.; Do not advance tube feeding rate unless K+ is = or > 3.0, Mg++ is = or > 1.5, and Phos is = or ...    DVT Prophylaxis: Pneumatic Compression Devices  Parker Catheter: Not present  Lines: None     Cardiac Monitoring: None  Code Status: No CPR- Do NOT Intubate      Clinically Significant Risk Factors          # Hyperchloremia: Highest Cl = 112 mmol/L in last 2 days, will monitor as appropriate        # Hypomagnesemia: Lowest Mg = 1.6 mg/dL in last 2 days, will replace as needed   # Hypoalbuminemia: Lowest albumin = 3.3 g/dL at 5/11/2025  7:01 AM, will monitor as appropriate     # Hypertension: Noted on problem list     # Dementia: noted on problem list            # Financial/Environmental Concerns:           Social Drivers of Health    Food Insecurity: Unknown (3/25/2025)    Food Insecurity     Within the past 12 months, did you worry that your food would run out before you got money to buy more?: Patient unable to answer     Within the past 12 months, did the food you bought just not last and you didn t have money to get more?: Patient unable to answer    Depression: At risk (4/2/2025)    PHQ-2     PHQ-2 Score: 4   Housing Stability: Unknown (3/25/2025)    Housing Stability     Do you have housing? : Patient unable to answer     Are you worried about losing your housing?: Patient unable to answer   Tobacco Use: Medium Risk (4/24/2025)    Patient History     Smoking Tobacco Use: Former     Smokeless Tobacco Use: Never   Financial Resource Strain: Unknown (3/25/2025)    Financial Resource Strain     Within the past 12 months, have you or your family members you live with been unable to get utilities (heat, electricity) when it was really needed?: Patient unable to answer   Transportation Needs: Unknown (3/25/2025)    Transportation Needs     Within the past 12 months, has lack of transportation kept you from medical appointments, getting your medicines, non-medical meetings or appointments, work, or from getting things that you need?: Patient unable to answer   Interpersonal Safety: Unknown (3/25/2025)    Interpersonal Safety     Do you feel physically and emotionally safe where you currently live?: Patient unable to answer     Within the past 12 months, have you been hit, slapped, kicked or otherwise physically hurt by someone?: Patient unable to answer     Within the past 12 months, have you been humiliated or emotionally abused in other ways by your partner or ex-partner?: Patient unable to answer          Disposition Plan     Medically Ready for Discharge: Anticipated in 5+ Days           The patient's care was discussed with the Attending Physician, Dr. Pena and Patient's Family.    Javier Johnson PA-C  Hospitalist Service, GOLD TEAM [address] [place] [address]  Securely message with CHORD (more info)  Text page via [facility] Paging/Directory   See signed in provider for up to date coverage information  ______________________________________________________________________    Interval History   Did not sleep much per  sitter.    Upon arrival today, patient sitting upright in chair. Awake. Alert. Responding to questions with attempts at speech (still very slurred) and head nods. Reports mild back pain, otherwise denies complaints. Updated patient with events of past several days.     Difficult to obtain any other subjective complaints due to slurred speech.    Daughter updated via phone.     Physical Exam   Vital Signs: Temp: 97.9  F (36.6  C) Temp src: Oral BP: (!) 147/85 Pulse: 84   Resp: 12 SpO2: 97 % O2 Device: None (Room air)    Weight: 121 lbs 11.2 oz    General Appearance:  Awake. Alert. Looks comfortable. Unable to assess orientation.  Respiratory:  Normal effort. CTAB. No wheezing, rhonchi, rales.   Cardiovascular:  RRR. S1/S2. No murmurs.   GI:  Soft, non-distended, non-tender, +BS.   Extremity:  1-2+ pitting edema.  Skin:  No visible rash.   Neuro:  Grossly non-focal.  Difficult exam as patient is unresponsive.     Medical Decision Making       50 MINUTES SPENT BY ME on the date of service doing chart review, history, exam, documentation & further activities per the note.      Data     I have personally reviewed the following data over the past 24 hrs:    7.2  \   11.2 (L)   / 220     140 109 (H) 11.8 /  265 (H)   3.6 22 1.21 (H) \     ALT: 16 AST: 26 AP: 209 (H) TBILI: 0.3   ALB: 3.3 (L) TOT PROTEIN: 6.0 (L) LIPASE: N/A       Imaging results reviewed over the past 24 hrs:   Recent Results (from the past 24 hours)   XR Abdomen Port 1 View    Narrative    Exam: XR ABDOMEN PORT 1 VIEW, 5/11/2025 9:39 AM    Indication: feeding tube placement    Comparison: Abdomen 5/10/2025    Findings:   Abdomen supine portable. Feeding tube tip in the region of the  pylorus. Redundancy of catheter tubing in the proximal stomach. There  is now kinking of the catheter tubing in the proximal stomach.  Nonobstructive bowel gas pattern. Degenerative changes. Partially  visualized hardware in the hips. Please see separate chest  radiographs.       Impression    Impression: Feeding tube tip in the region of the pylorus with kinking  of catheter tubing and redundancy in the proximal stomach.  Repositioning recommended.    LEIDY ESPINOZA MD         SYSTEM ID:  [id number]

## 2025-05-11 NOTE — PLAN OF CARE
Goal Outcome Evaluation:      Plan of Care Reviewed With: patient    Overall Patient Progress: no changeOverall Patient Progress: no change     BP (!) 137/94 (BP Location: Left arm)   Pulse 88   Temp 98.2  F (36.8  C) (Oral)   Resp 12   Wt 56 kg (123 lb 7.3 oz)   SpO2 97%   BMI 21.88 kg/m      Neuro: A&Ox to self. Aware of place with choices. Able to answer yes/no question. Able to follow commands. Visually tracks appropriately. Strength 3-4/5 throughout. Attempts to verbalize- speech garbled. Intermittent restlessness. Awake through night. Mitts on to line pulling. Bedside attendant in place.  Cardiac: Afebrile, VSS. Telemetry maintained: NSR   Respiratory: RA   GI/: incontinent of B/B. Pure wick changed x2. Smears of BM x2  Diet/appetite: NPO. Denies nausea   Endocrine: Q 4 hr BG/ssi orders in place. Insulin admin per ordered parameters for BG's of 208 and 162  Activity: repositioned q 2 hours with assist of 2. Pt able to turn with assist   Pain: . Denies   Skin: reddened renée area- barrier cream applied. Preventative mepilex on sacrum- skin intact. Generalized bruising  Lines: piv infusing D50.45NS at 75 ml/hr  Drains: pure wick- changed x2 . NJ-clamped/bridled in place. Site wdl.    Rested btwn cares. Bedside attendant in place. Continue to monitor. Notify MD of changes/concerns        Problem: Adult Inpatient Plan of Care  Goal: Optimal Comfort and Wellbeing  Outcome: Not Progressing     Problem: Adult Inpatient Plan of Care  Goal: Readiness for Transition of Care  Outcome: Not Progressing     Problem: Delirium  Goal: Optimal Coping  Outcome: Not Progressing

## 2025-05-11 NOTE — PROGRESS NOTES
RN was called for NG tube not flushing post placement.    NG tube was clamped on arrival. NG was flushed with water, found to be slightly sluggish. Then the NG tube was rewired to confirm placement. Afterwards NG tube was able to be flushed without difficulties. Flushed NG was 60 mL of water easily administered. Bedside RN was notified and Charge RN did flush NG tube without difficulties. Abd xray was then released to confirm placement for use. Nasal bridle remains in place.

## 2025-05-11 NOTE — PLAN OF CARE
Goal Outcome Evaluation:      Plan of Care Reviewed With: patient    Overall Patient Progress: improvingOverall Patient Progress: improving      Shift Hours: 0700 - 1900    Assessment:  Body systems that were not at patient's baseline Cognitive/Behavioral/Neuro. Focused body system assessments documented in flowsheets.        Activity     Fall Risk Score: 95   Bed alarm on? Yes     Activity Assistance Provided: assistance, 1 person      Assistive Device Utilized: gait belt, walker    Pain: Voltaren gel applied to back with relief.     Labs/RN Managed Protocols:     Lines/Drains: PIV, NG tube infusing TF @ 10 mL/hr    Nutrition: NPO, tubefeeding    Goal Outcome Evaluation  Plan of Care Reviewed With: patient  Overall Patient Progress: improving     Pt alert today, able to intermittently follow commands and answer yes/no questions. Continue to maintain delirium precautions.  Up and ambulating in the room with Ax2, GB and walker.  Using bedside commode.  Unable to flush NG tube multiple times throughout shift, required Flyer RN troubleshooting and XR several times.  Continued to monitor BG q4h with sliding scale insulin.      Awaiting abdominal XR results before reinitiating tube feeds.    NPH dose this evening ordered in range; give dose depending on what rate TF is running at.    Barriers to Discharge:   Safe discharge plan, nutrition, insulin regimen.

## 2025-05-11 NOTE — PROGRESS NOTES
Inpatient Diabetes Management Service: Daily Progress Note     HPI: Isabell Matthews is a 64 year old female with history of HTN, HLD, DM type I, diabetic neuropathy, CKD3b, orthostatic hypotension, seizures, vascular dementia, hypothyroidism, recurrent UTI's, and freuqent falls who was admitted to [facility] with concern for stroke after presenting with acute neurologic symptoms, MRI with acute infarcts within R corona radiata, precentral gyrus and operculum, though presenting symptoms do not completely explain focal deficits and not a candidate for advanced therapies. Hospital course complicated by dysphagia and now NPO per SLP with plan to start tube feedings. IDS consulted for assistance with glycemic management.          Assessment/Plan:     Assessment:   Type 1 Diabetes Mellitus with DEMETRIA elevated at 6.2 and C-peptide=<0.1 on 6/17/2024, complicated by peripheral neuropathy, nephropathy, hx of DKA and hypoglycemia. Sub-optimal control. (A1c 8.8 % 3/23/25, Hgb: 10.5) goal A1c more appropriately 7.5%.  Vascular dementia with acute neurologic changes and new dysphagia- daughter is caregiver and manages insulin/medications   Tube feedings - pending start     Plan/Recommendations:   IF BG >250 x2 overnight start IV insulin to assist with glycemic trends and dose finding for tube feeds.   - Lantus 10 units q 24 hrs at 2200   - NPH 4 units with start of tube feeds at 1100 - NOT GIVEN due to pen not available and TF clogged  -Start NPH 8 units at 2100 with tube feeds at 20 ml/hr   - NO Novolog Meal Coverage: Patient strict NPO   - Novolog Correction Scale: Medium resistance (ISF: 50)  every 4 hours  - BG monitoring: every 4 hours   - Continue Dextrose fluids while NPO- stop once tube feeds are started.   - PRN D10 at 30 ml/hr if tube feeds are stopped/interrupted (dosed for Osmolite 1.5 (or equivalent) at 20 ml/hr) dosing provides 75% of CHO that would have been given by TF   - Hypoglycemia protocol    -  Carb counting protocol     Per ADA guidelines, treatment goals and recommendations for older adults with DM and medically complexity is less stringent BG control/A1c for safety - targets of 110-180 mg/dL and up to 250 mg/dL reasonable. Avoid reliance on A1c. Glucose decisions should be based on avoidance of hypoglycemia and symptomatic hyperglycemia.     Discussion:   NG tube replaced but unable to start TF overnight due to tube not functioning. Remains on D5 1/2 NS at 75 ml/hr which provides 3.75 g CHO per hour. Total daily Novolog correction 12 units with this yesterday and BGS with mild hyperglycemia. TF at 10 ml/hr will provide 2.03 g CHO/hr. Tube unclogged and feeds started this AM. Conservatively, had planned to start low dose of NPH 4 units with feeds at 10 ml/hr at 10:00, however RN called IDS to state tube had been clogged and NPH dose was delayed. Feedsd restarted at 1400 and will increase to 20 ml/hr around 2200. Will plan to start NPH 8 units with feeds at 20 ml/hr at 2200. Continue q 4 hr BG checks and correction scale.      5:29 PM Addendum: Multiple attempts to start TF this afternoon with clogging occurring. Notified by RN that feeding tube again clogged around 1630, with BG still up to 260s. Suspect the intermittent CHO covered with correction scale alone is causing elevation, though unusual because she has been receiving less CHO overall with dextrose fluids off. If trend continues despite correction scale and addition of NPU,  would recommend starting IV insulin overnight      Tube Feed Advancement: Osmolite 1.5 (or equivalent)  10 ml/hr provides 2.03 g CHO/hr   20 ml/hr provides 4.06 g CHO/hr   30 ml/hr provides 6.09 g CHO/hr   Goal: 40 ml/hr provides 8.125 g CHO/hr     Discharge Planning: (tentative)  Medications: TBD  - adjustment of PTA dosing with addition of NPH to cover tube feeds as indicated   Needs set doses for day program, would be able to use ICR at home.   Test Claims:  none needed.    Education:  Needs to be assessed closer to discharge.    Outpatient Follow-up: PCP- Dr Kellogg ( LOV 4/24/25), until able to establish with [facility] End[facility]- has appointment with Mai Figueroa 8/4/25    Please notify Inpatient Diabetes Service if changes are planned to steroids, nutrition, TPN/TF and anticipated procedures requiring prolonged NPO status.         Interval History/Review of Systems :   The last 24 hours progress and nursing notes reviewed.  Withdrawn. Quiet. Confused. Sitter at bedside for pulling lines/tubes.     Planned Procedures/Surgeries: none    Inpatient Glucose Control:       Recent Labs   Lab 05/11/25  0349 05/10/25  2348 05/10/25  2200 05/10/25  1959 05/10/25  1625 05/10/25  1221   * 208* 197* 220* 224* 171*             Medications Impacting Glycemia:   Steroids: none  D5W containing solutions/medications: D5 1/2 NS at 75 ml/hr while NPO   Other medications impacting glucose: none        Nutrition:   Orders Placed This Encounter      NPO for Medical/Clinical Reasons Except for: No Exceptions    Supplements:  none  TF:   5/10: will start Osmolite 1.5 (or equivalent) at 10 ml.hr to advance by 10ml every 8 hours (goal 40 ml/hr provides 195 CHO)   TPN: none         Diabetes History: see full consult note for complete diabetes history   Diabetes Type and Duration: DM for > 40 years, diagnosed in her 40s. Per daughter,  initially told she has T2DM then T1DM for a while then flipped back to being told T2DM.      6/17/2024 positive GAD65 antibody and c-peptide <0.1 but BG elevated     PTA Medication Regimen:   From discharge 3/30/25:- daughter not present to confirm   - Lantus 18 units once daily at HS  - Lispro ICR 1:15 TID AC, 1:20 PRN with snacks/supplements  - Lispro correction 1:50>150 TID AC, >200 HS     At discharge had discussed challenges around glucose lability and dosing: Lantus dose of 18 units may be too high if patient is not eating. However, daughter notes that with  more dramatic changes patient will sometimes rise to 400s and has been in DKA before which is hard to balance. If she is concerned about mom going low at night, will try to give her a snack to support Lantus dosing. She is working to resume CGM to more closely monitor blood sugars.  Missing doses?: unknown, daughter not present to confirm   Historical Diabetes Medications: various insulins and dosing      Glucose monitoring device/frequency/trends: Historically has used glucometer before meals and at bedtime. Has tried and failed multiple CGMs (issues with accuracy or patient picking CGM off).   Current trends unknown as daughter not present to discuss- historically known to be labile with needs dropping dramatically when NPO    Hypoglycemia PTA:   - Frequency: currently unknown, but historically labile.   - Severity: + history of severe unconscious lows   - Awareness: variable, not intact    - Treatment: candy, juice       Outpatient Diabetes Provider: Current PCP: Dr Kellogg ( LOV 4/24/25) ; seen by Dr Bony Castaneda in the past (LOV 10/7/24)   Endo referral placed by PCP and IDS last admission, Scheduled with NEGRA Ramirez 8/4/25   Formal Diabetes Education/Educator: none recently        Physical Exam:   BP (!) 137/94 (BP Location: Left arm)   Pulse 88   Temp 98.2  F (36.8  C) (Oral)   Resp 12   Wt 56 kg (123 lb 7.3 oz)   SpO2 97%   BMI 21.88 kg/m      General Appearance: Confused, lethargic, but calm   HEENT: Normocephalic, atraumatic.   Lungs:  Breathing comfortably   Abdomen: Round, nondistended.   Extremities:  No significant  lower extremity edema.   Skin: No obvious rashes, lesions or bruising.   Neuro: disoriented x4 at baseline    Psych:  Calm         Data:     Lab Results   Component Value Date    A1C 8.8 (H) 03/23/2025    A1C 9.6 (H) 01/16/2025    A1C 11.2 (H) 09/23/2024    A1C 10.2 (H) 05/20/2024    A1C 10.7 (H) 01/29/2024    A1C 7.8 (H) 06/21/2021    A1C 11.7 (H) 09/27/2020       ROUTINE IP  LABS (Last four results)  BMP  Recent Labs   Lab 05/11/25  0349 05/10/25  2348 05/10/25  2200 05/10/25  1959 05/10/25  1735 05/10/25  1221 05/10/25  0817 05/09/25  1222 05/09/25  0752 05/08/25  0803 05/08/25  0646 05/07/25  2324 05/07/25  1608 05/07/25  1340 05/07/25  1337 05/07/25  1329   NA  --   --   --   --   --   --  142  --  147*  --  143  --  139  --   --  138   POTASSIUM  --   --   --   --  3.8  --  3.6  --  3.9  --  4.2  --   --   --   --  4.9   CHLORIDE  --   --   --   --   --   --  112*  --  115*  --  110*  --   --   --   --  106   VERONICA  --   --   --   --   --   --  8.9  --  8.9  --  9.0  --   --   --   --  8.9   CO2  --   --   --   --   --   --  20*  --  20*  --  18*  --   --   --   --  19*   BUN  --   --   --   --   --   --  19.7  --  30.7*  --  34.4*  --   --   --   --  37.2*   CR  --   --   --   --   --   --  1.38*  --  1.68*  --  1.60*  --   --  1.8*  --  1.59*   * 208* 197* 220*  --    < > 152*  166*   < > 77   < > 243*   < >  --   --    < > 413*    < > = values in this interval not displayed.     CBC  Recent Labs   Lab 05/10/25  0817 05/09/25  0752 05/08/25  0646 05/07/25  1329   WBC 8.1 8.4 6.1 5.7   RBC 4.06 3.63* 3.69* 3.71*   HGB 10.9* 9.9* 9.9* 10.0*   HCT 35.6 31.0* 32.4* 33.4*   MCV 88 85 88 90   MCH 26.8 27.3 26.8 27.0   MCHC 30.6* 31.9 30.6* 29.9*   RDW 15.4* 15.7* 15.3* 15.0    236 253 222     INR  Recent Labs   Lab 05/07/25  1329   INR 1.00       Inpatient Diabetes Service will continue to follow, please don't hesitate to contact the team with any questions or concerns.     PEGGY Hill, CNP   Inpatient Diabetes Management Service   Pager: [phone]   Available on Vocera      Plan discussed with patient, bedside RN, and primary team via this note.    To contact Inpatient Diabetes Service:     7 AM - 5 PM: Page the IDS BULMARO following the patient that day (see filed or incomplete progress notes/consult notes under Endocrinology)    OR if uncertain of provider  assignment: page job code [phone]    5 PM - 7 AM: First call after hours is to primary service.    For urgent after-hours questions, page job code for on call fellow: [phone]     I spent a total of 50 minutes on the date of the encounter doing prep/post-work, chart review, history and exam, documentation and further activities per the note including lab review, multidisciplinary communication, counseling the patient and/or coordinating care regarding acute hyper/hypoglycemic management, as well as discharge management and planning/communication.

## 2025-05-11 NOTE — PLAN OF CARE
Goal Outcome Evaluation:    Plan of Care Reviewed With: patient    Overall Patient Progress: no change  Overall Patient Progress: no change    Assumed cares from [id number]. Vitally stable on Ra. R PIV infusing D5 1/2 NS @75 mL/hr. No signs of pain. Purewick in place with good output. Unable to start TF due to nonfunctioning bridled NG tube, provider aware. Pt restless and attempting to remove mitts. BG monitored q4h. 1:1 sitter at bedside for line pulling.

## 2025-05-12 ENCOUNTER — APPOINTMENT (OUTPATIENT)
Dept: SPEECH THERAPY | Facility: CLINIC | Age: 65
End: 2025-05-12
Payer: COMMERCIAL

## 2025-05-12 ENCOUNTER — APPOINTMENT (OUTPATIENT)
Dept: PHYSICAL THERAPY | Facility: CLINIC | Age: 65
DRG: 064 | End: 2025-05-12
Attending: PHYSICIAN ASSISTANT
Payer: COMMERCIAL

## 2025-05-12 LAB
ANION GAP SERPL CALCULATED.3IONS-SCNC: 15 MMOL/L (ref 7–15)
BUN SERPL-MCNC: 14.4 MG/DL (ref 8–23)
CALCIUM SERPL-MCNC: 8.9 MG/DL (ref 8.8–10.4)
CHLORIDE SERPL-SCNC: 109 MMOL/L (ref 98–107)
CREAT SERPL-MCNC: 1.3 MG/DL (ref 0.51–0.95)
EGFRCR SERPLBLD CKD-EPI 2021: 45 ML/MIN/1.73M2
ERYTHROCYTE [DISTWIDTH] IN BLOOD BY AUTOMATED COUNT: 15.2 % (ref 10–15)
GLUCOSE BLDC GLUCOMTR-MCNC: 165 MG/DL (ref 70–99)
GLUCOSE BLDC GLUCOMTR-MCNC: 180 MG/DL (ref 70–99)
GLUCOSE BLDC GLUCOMTR-MCNC: 204 MG/DL (ref 70–99)
GLUCOSE BLDC GLUCOMTR-MCNC: 240 MG/DL (ref 70–99)
GLUCOSE BLDC GLUCOMTR-MCNC: 252 MG/DL (ref 70–99)
GLUCOSE BLDC GLUCOMTR-MCNC: 257 MG/DL (ref 70–99)
GLUCOSE BLDC GLUCOMTR-MCNC: 360 MG/DL (ref 70–99)
GLUCOSE SERPL-MCNC: 144 MG/DL (ref 70–99)
HCO3 SERPL-SCNC: 16 MMOL/L (ref 22–29)
HCT VFR BLD AUTO: 35.9 % (ref 35–47)
HGB BLD-MCNC: 11.2 G/DL (ref 11.7–15.7)
MCH RBC QN AUTO: 26.9 PG (ref 26.5–33)
MCHC RBC AUTO-ENTMCNC: 31.2 G/DL (ref 31.5–36.5)
MCV RBC AUTO: 86 FL (ref 78–100)
PLATELET # BLD AUTO: 147 10E3/UL (ref 150–450)
POTASSIUM SERPL-SCNC: 3.8 MMOL/L (ref 3.4–5.3)
RBC # BLD AUTO: 4.16 10E6/UL (ref 3.8–5.2)
SODIUM SERPL-SCNC: 140 MMOL/L (ref 135–145)
WBC # BLD AUTO: 6.3 10E3/UL (ref 4–11)

## 2025-05-12 PROCEDURE — 120N000002 HC R&B MED SURG/OB UMMC

## 2025-05-12 PROCEDURE — 250N000012 HC RX MED GY IP 250 OP 636 PS 637

## 2025-05-12 PROCEDURE — 85018 HEMOGLOBIN: CPT | Performed by: PHYSICIAN ASSISTANT

## 2025-05-12 PROCEDURE — 85014 HEMATOCRIT: CPT | Performed by: PHYSICIAN ASSISTANT

## 2025-05-12 PROCEDURE — 99233 SBSQ HOSP IP/OBS HIGH 50: CPT | Mod: FS | Performed by: STUDENT IN AN ORGANIZED HEALTH CARE EDUCATION/TRAINING PROGRAM

## 2025-05-12 PROCEDURE — 250N000009 HC RX 250: Mod: JZ | Performed by: STUDENT IN AN ORGANIZED HEALTH CARE EDUCATION/TRAINING PROGRAM

## 2025-05-12 PROCEDURE — 250N000013 HC RX MED GY IP 250 OP 250 PS 637: Performed by: STUDENT IN AN ORGANIZED HEALTH CARE EDUCATION/TRAINING PROGRAM

## 2025-05-12 PROCEDURE — 97161 PT EVAL LOW COMPLEX 20 MIN: CPT | Mod: GP

## 2025-05-12 PROCEDURE — 250N000011 HC RX IP 250 OP 636: Mod: JZ

## 2025-05-12 PROCEDURE — 36415 COLL VENOUS BLD VENIPUNCTURE: CPT | Performed by: PHYSICIAN ASSISTANT

## 2025-05-12 PROCEDURE — 80048 BASIC METABOLIC PNL TOTAL CA: CPT | Performed by: PHYSICIAN ASSISTANT

## 2025-05-12 PROCEDURE — 258N000001 HC RX 258

## 2025-05-12 PROCEDURE — 97530 THERAPEUTIC ACTIVITIES: CPT | Mod: GP

## 2025-05-12 PROCEDURE — 92526 ORAL FUNCTION THERAPY: CPT | Mod: GN

## 2025-05-12 PROCEDURE — 99232 SBSQ HOSP IP/OBS MODERATE 35: CPT | Mod: 24

## 2025-05-12 RX ORDER — SODIUM BICARBONATE 325 MG/1
325 TABLET ORAL ONCE
Status: COMPLETED | OUTPATIENT
Start: 2025-05-12 | End: 2025-05-12

## 2025-05-12 RX ADMIN — SODIUM BICARBONATE 325 MG: 325 TABLET ORAL at 10:25

## 2025-05-12 RX ADMIN — DICLOFENAC SODIUM 2 G: 10 GEL TOPICAL at 16:35

## 2025-05-12 RX ADMIN — DICLOFENAC SODIUM 2 G: 10 GEL TOPICAL at 12:20

## 2025-05-12 RX ADMIN — PANCRELIPASE 1 CAPSULE: 60000; 12000; 38000 CAPSULE, DELAYED RELEASE PELLETS ORAL at 10:25

## 2025-05-12 RX ADMIN — INSULIN HUMAN 4 UNITS: 100 INJECTION, SUSPENSION SUBCUTANEOUS at 10:28

## 2025-05-12 RX ADMIN — INSULIN GLARGINE 10 UNITS: 100 INJECTION, SOLUTION SUBCUTANEOUS at 22:15

## 2025-05-12 RX ADMIN — LEVOTHYROXINE SODIUM 66 MCG: 20 INJECTION, SOLUTION INTRAVENOUS at 08:17

## 2025-05-12 RX ADMIN — DICLOFENAC SODIUM 2 G: 10 GEL TOPICAL at 20:07

## 2025-05-12 RX ADMIN — LEVETIRACETAM 500 MG: 5 INJECTION INTRAVENOUS at 05:38

## 2025-05-12 RX ADMIN — DEXTROSE: 10 SOLUTION INTRAVENOUS at 06:13

## 2025-05-12 RX ADMIN — LEVETIRACETAM 500 MG: 5 INJECTION INTRAVENOUS at 18:15

## 2025-05-12 ASSESSMENT — ACTIVITIES OF DAILY LIVING (ADL)
ADLS_ACUITY_SCORE: 80
ADLS_ACUITY_SCORE: 76
ADLS_ACUITY_SCORE: 76
ADLS_ACUITY_SCORE: 80
ADLS_ACUITY_SCORE: 85
ADLS_ACUITY_SCORE: 80
ADLS_ACUITY_SCORE: 85
ADLS_ACUITY_SCORE: 76
ADLS_ACUITY_SCORE: 80

## 2025-05-12 NOTE — PLAN OF CARE
Goal Outcome Evaluation:    Plan of Care Reviewed With: patient    Overall Patient Progress: no change  Overall Patient Progress: no change    Assumed cares from [id number]. Pt is alert and able to follow commands intermittently, answers yes/no questions. Not oob during shift. Purewick in place. Troubleshooting NG tube multiple times due to obstruction alarm on TF pump -resistant flushing, TF stopped this AM. D10 infusing via R PIV. Pt up most of shift. 1:1 sitter remains at bedside for line pulling.

## 2025-05-12 NOTE — PROGRESS NOTES
[facility]    Medicine Progress Note - Hospitalist Service, GOLD TEAM 6    Date of Admission:  5/7/2025    Assessment & Plan   Isabell Matthews is a 64 year old female with history of HTN, HLD, DM type I, diabetic neuropathy, CKD3b, orthostatic hypotension, seizures, vascular dementia, hypothyroidism, recurrent UTI's, and freuqent falls who was admitted to [facility] with concern for stroke after presenting with acute neurologic symptoms.    Updates for 5/12/2025:  - Viokace + Bicarb for clogged NJ tube  - Resume TF when able, advanced as tolerated  - Endocrine following for insulin  - Continue to hold PO meds d/t tube clogging  - PT/OT consults  - VFSS recommended by SLP        # Acute infarcts within right corona radiata, precentral gyrus, and operculum   # Hx multiple prior CVAs  # Hx of seizures  Presents with new findings of slurred speech, dysphagia, decreased responsiveness, confusion. No new focal neurological deficits noted on exam. Head CT negative for acute infarct or hemorrhage. CTA with multifocal stenosis involving left RADHA and right MCA which has progressed since 1/16. Neuro Stroke Team consulted. Unclear if symptoms related to stroke vs seizure vs encephalopathy. Loaded with keppra on arrival. EEG negative. MRI showed acute infarcts within the R corona radiata, precentral gyrus, and operculum. Not a candidate for advanced therapies. Now more awake and alert, however continues to have significant dysarthria, likely related to new strokes. Otherwise clinically stable.   - Neuro stroke team following  - Q4h neuro checks  - NPO per SLP  - Continue ASA 300mg MA for now  Once able to take PO, switch to DAPT with ASA + Plavix x 90 days   - Atorvastatin 40mg daily (currently held while NPO)  - Keppra 750mg IV q12h  - Echo 5/9 unrevealing  - Will need Ziopatch x 14 days upon discharge  - Stroke follow up in 8 weeks    # Acute metabolic encephalopathy, likely multifactorial    # Hx of vascular dementia  Acute change in status possibly secondary to acute stroke vs encephalopathy.  Likely multifactorial with UTI, dehydration, hyperglycemia, hypercapnia, and hospital environment contributing. No significant electrolyte abnormalities. No suspicious meds. TSH 0.14. Significant improvement noted 5/11. Still has significant dysarthria, making it difficult to speak, but comprehension appears intact and making logical statements. Doubt significant encephalopathy at this time, however unclear if close to baseline given neurologic deficits. Not sleeping at night, however no agitation. Pt reports she used to work night shift and still maintains a reverse sleep cycle. Daughter had made mention of this 5/11 as well, so no clear sundowning.   - Monitor clinically  - Continue bedside sitter for now  - Delirium precautions    # UTI, recurrent  Urine culture from 5/1 shows pan-susceptible E.Coli. Aebrile, WBC normal. No other obvious source of infection. Repeat UA/UC negative. No improvement in mental status despite IV ceftriaxone, discontinued 5/8 after completing ~7 day course of antibiotics.  - Monitor     # Dysphagia  Patient failed initial swallow study. Likely in the setting of acute stroke. SLP consulted, recommended strict NPO. NG placed 5/11. Now more awake and alert but continues to have severe dysarthria so likely unable to swallow.  - Strict NPO   - Will have SLP reassess  - Hold PO meds  - Starting NJ tube feeds per nutrition recs    # Hypernatremia, resolved  Free water deficit in setting of dysphagia and NPO status. Resolved with hypotonic fluids.   - BMP in AM    # Possible Stress Cardiomyopathy  Echo shows LVEF 50-55% with mid ventricular hypokinesis w/ preserved apical and mid segements c/w stress CM. No obvious signs of heart failure.   - Monitor     # Type 1 DM  A1c of 8.8% on 3/2025. BS in 300-400 this admission. NPO due to dysphagia. Hx of labile sugars and hypoglycemia in setting  of infection. Home regimen: Lantus 18u at bedtime. Endocrine consulted to assist with insulin dosing in setting of tube feeds.  - Continue Lantus 10U at bedtime   - NPH 4 units AM  - NPH 4-12 units PM (depending on TF rate)  - MSSI q4h  - Hypoglycemia protocol      #Hypothyroidism  most recent TSH 0.99. Repeat TSH 0.14.  Daughter notes recent medication adjustments (brand synthroid vs generic) therefore may have been over-replaced. Cannot r/o sick euthyroid state as well.  - Hold PO synthroid 88mcg  - Continue IV levothyroxine 66mg daily  - Recheck TSH in 1-2 weeks when clinically stable     #CKD3b  Baseline Cr of 1.4-1.6. Cr improved to 1.2 with IVF. Suspect chronic dehydration contributing to reduced GFR. Repeat Cr 1.3 today.  - BMP daily     #HTN  #HLD  -180 on admission. Can allow for permissive hypertension in setting of acute stroke.  - Hold carvedilol  - Hold atorvastatin, aspirin  - IV hydralazine PRN for SBP>180     # Chronic low back pain  - Diclofenac gel  - Hold duloxetine, gabapentin     # Seasonal allergies  - Hold loratidine          Diet: NPO for Medical/Clinical Reasons Except for: No Exceptions  Adult Formula Drip Feeding: Continuous Osmolite 1.5; Nasogastric tube; Goal Rate: 40; mL/hr; Initiate at 10ml/hr and advance by 10ml Q8H as tolerated.; Do not advance tube feeding rate unless K+ is = or > 3.0, Mg++ is = or > 1.5, and Phos is = or ...    DVT Prophylaxis: Pneumatic Compression Devices  Parker Catheter: Not present  Lines: None     Cardiac Monitoring: None  Code Status: No CPR- Do NOT Intubate      Clinically Significant Risk Factors          # Hyperchloremia: Highest Cl = 109 mmol/L in last 2 days, will monitor as appropriate        # Hypomagnesemia: Lowest Mg = 1.6 mg/dL in last 2 days, will replace as needed   # Hypoalbuminemia: Lowest albumin = 3.3 g/dL at 5/11/2025  7:01 AM, will monitor as appropriate     # Hypertension: Noted on problem list     # Dementia: noted on problem list             # Financial/Environmental Concerns:           Social Drivers of Health    Food Insecurity: Unknown (3/25/2025)    Food Insecurity     Within the past 12 months, did you worry that your food would run out before you got money to buy more?: Patient unable to answer     Within the past 12 months, did the food you bought just not last and you didn t have money to get more?: Patient unable to answer   Depression: At risk (4/2/2025)    PHQ-2     PHQ-2 Score: 4   Housing Stability: Unknown (3/25/2025)    Housing Stability     Do you have housing? : Patient unable to answer     Are you worried about losing your housing?: Patient unable to answer   Tobacco Use: Medium Risk (4/24/2025)    Patient History     Smoking Tobacco Use: Former     Smokeless Tobacco Use: Never   Financial Resource Strain: Unknown (3/25/2025)    Financial Resource Strain     Within the past 12 months, have you or your family members you live with been unable to get utilities (heat, electricity) when it was really needed?: Patient unable to answer   Transportation Needs: Unknown (3/25/2025)    Transportation Needs     Within the past 12 months, has lack of transportation kept you from medical appointments, getting your medicines, non-medical meetings or appointments, work, or from getting things that you need?: Patient unable to answer   Interpersonal Safety: Unknown (3/25/2025)    Interpersonal Safety     Do you feel physically and emotionally safe where you currently live?: Patient unable to answer     Within the past 12 months, have you been hit, slapped, kicked or otherwise physically hurt by someone?: Patient unable to answer     Within the past 12 months, have you been humiliated or emotionally abused in other ways by your partner or ex-partner?: Patient unable to answer          Disposition Plan     Medically Ready for Discharge: Anticipated in 5+ Days           The patient's care was discussed with the Attending Physician, Dr. Pena  and Patient's Family.    Javier Johnson PA-C  Hospitalist Service, GOLD TEAM [address] [place] [address]  Securely message with [employer] (more info)  Text page via [facility] Paging/Directory   See signed in provider for up to date coverage information  ______________________________________________________________________    Interval History   Patient remains awake and alert. Speech remains slurred but making more sense. Did not sleep last night. Pt indicates that she worked night shifts (daughter confirmed) and that she still keeps that schedule with sleep. She denies any pain, chest pain, dyspnea. Tolerating TF. Mild tenderness on exam which pt states is chronic.    Physical Exam   Vital Signs: Temp: 97.7  F (36.5  C) Temp src: Oral BP: (!) 155/76 (Nurse notified) Pulse: 95   Resp: 18 SpO2: 97 % O2 Device: None (Room air)    Weight: 121 lbs 11.2 oz    General Appearance:  Awake. Alert. Difficult to assess orientation. NAD. Follows commands.  HEENT:  NJ tube in R nares. MMM.  Respiratory:  Normal effort. CTAB. No wheezing, rhonchi, rales.   Cardiovascular:  RRR. S1/S2. No murmurs.   GI:  Soft, non-distended, mildly tender throughout, no guarding or rebound, +BS.   Extremity:  Trace pitting edema.  Skin:  No visible rash.   Neuro:  Grossly non-focal.      Medical Decision Making       50 MINUTES SPENT BY ME on the date of service doing chart review, history, exam, documentation & further activities per the note.      Data     I have personally reviewed the following data over the past 24 hrs:    6.3  \   11.2 (L)   / 147 (L)     140 109 (H) 14.4 /  180 (H)   3.8 16 (L) 1.30 (H) \       Imaging results reviewed over the past 24 hrs:   Recent Results (from the past 24 hours)   XR Abdomen Port 1 View    Narrative    Exam: XR ABDOMEN PORT 1 VIEW, 5/11/2025 9:39 AM    Indication: feeding tube placement    Comparison: Abdomen 5/10/2025    Findings:   Abdomen supine portable. Feeding tube tip  in the region of the  pylorus. Redundancy of catheter tubing in the proximal stomach. There  is now kinking of the catheter tubing in the proximal stomach.  Nonobstructive bowel gas pattern. Degenerative changes. Partially  visualized hardware in the hips. Please see separate chest  radiographs.      Impression    Impression: Feeding tube tip in the region of the pylorus with kinking  of catheter tubing and redundancy in the proximal stomach.  Repositioning recommended.    LEIDY ESPINOZA MD         SYSTEM ID:  [id number]   XR Abdomen Port 1 View    Narrative    EXAM: XR ABDOMEN PORT 1 VIEW, 5/11/2025 8:59 PM    INDICATION: feeding tube placement    COMPARISON: Abdominal radiograph 5/11/2025    FINDINGS:  1.  Enteric tube coiled in the stomach and terminating in the distal  stomach.  2.  Nonobstructive bowel gas pattern.  3.  Lung bases are clear.    I have personally reviewed the examination and initial interpretation  and I agree with the findings.    JAY JAY CARMEN MD         SYSTEM ID:  [id number]

## 2025-05-12 NOTE — PROGRESS NOTES
[facility]    Medicine Progress Note - Hospitalist Service, GOLD TEAM 6    Date of Admission:  5/7/2025    Assessment & Plan   Isabell Matthews is a 64 year old female with a past medical history of HTN, T1DM, HLD, diabetic neuropathy, orthostatic hypotension, seizures, vascular dementia, prior CVAs, hypothyroidism, recurrent UTIs, and frequent falls. She initially presented to the ED for evaluation of stroke-like symptoms and was admitted for further monitoring and management.     Multifocal Acute Cerebral Infarcts  Hx of Multiple CVAs  Hx of Seizures  Pt initially presented w/ new findings of dysarthria, dysphagia, decreased responsiveness, confusion. However, while in the ED, no new focal neurologic sx were observed. Head CT negative for acute infarct or hemorrhage. CTA w/ multifocal stenosis involving L RADHA (A2) and R MCA which had progressed since 1/16/25. Stroke Neuro team consulted while in the ED, and loaded with Keppra on arrival. EEG negative. ECHO showed LV 50-55%, mnild ventricular hypokinesis, RV normal size, no significant valvular abnormalities. Initially, she was found to have a UTI, so there was concern that perhaps her presentation was d/t recrudescence of prior CVA deficits, however, MRI brain did show acute infarcts in R corona radiata, precentral gyrus, and operculum. Unfortunately, she was not a candidate for advanced therapies. Aside from ongoing dysarthria, no new focal neurologic deficits. Clinically stable this morning, but is somnolent on exam.  - Stroke Neuro team following, appreciate assistance  - Q4H Neuro checks for now, will consider spacing out once BG checks are spaced out less (are Q1H right now while on insulin drip)  - NPO per SLP for now  - Continue ASA 300mg FL fr now  - Now that we have enteral access via NJT, will plan on switching to DAPT w/ ASA + Plavix x90 days once SLP has seen and if no recs for PEG placement  - Atorvastatin  40mg daily- Continue IV Keppra 750mg Q12H for now  - Monitor on tele for arrhythmia   - At discharge, will need ZioPatch x14 days mailed out  - Stroke Neuro follow-up in 8 weeks    Acute Metabolic Encephalopathy, likely multifactorial  Hx of Vascular Dementia  Acute change in status noted on arrival, possibly 2/2 acute CVA vs metabolic encephalopathy. Likely multifactorial with UTI, dehydration, hyperglycemia, hypercapnia, and hospital environment contributing, as well as newfound acute CVA as above. No significant electrolyte abnormalities. No suspicious meds. TSH 0.14. Today, she is quite somnolent on exam, but stirs to my voice and her name. Does not open eyes or participate in interview.  - Monitor clinically  - Continue bedside sitter to ensure no pulling of NJT  - Delirium precautions   - Would recommend bed inclined, lights on, blinds open and TV on during the day and then at night having all these off and bed more supine to encourage proper sleep-wake cycle  - Will schedule melatonin 5mg at bedtime starting tonight      UTI, recurrent  Urine culture from 5/1/25 shows pan-susceptible E.Coli. On arrival here, aebrile, WBC normal. No other obvious source of infection. Repeat UA/UC negative. At this point, pt has completed adequate therapy for uncomplicated UTI. However, course was extended to 7 days d/t encephalopathy.   - Completed 7 days of IV Ceftriaxone on 5/12     Dysphagia  Patient failed initial swallow study. Likely in the setting of acute encephalopathy and acute stroke. SLP consulted, recommended strict NPO. NG placed 5/10, and has now begun TFs without issues.However, more somnolent today, unable to discuss swallowing/TFs.  - Per SLP evaluation on 5/12, recs to keep NPO for now    - VFSS pending  - Now that we have enteral access, will resume all applicable PO meds today  - Now up to goal rate of 45mL/hr for TFs via NJT     Hypernatremia, resolved  Free water deficit in setting of dysphagia and NPO  status. Resolved with hypotonic fluids.   - Trend lytes daily for now especially as initiating TFs, monitor for refeeding     Possible Stress Cardiomyopathy  ECHO in the ED showed LVEF 50-55% with mid ventricular hypokinesis w/ preserved apical and mid segements c/w stress CM. No obvious signs of heart failure. Clinically, she has no s/sx to suggest acutely decompensated HF.  - Continue to monitor     Type 1 Diabetes Mellitus  A1c of 8.8% in 3/2025. BG in range of 300-400 this admission. NPO due to dysphagia. Hx of labile sugars and hypoglycemia in setting of infection. Home regimen: Lantus 18 units at bedtime. Endocrine consulted to assist with insulin dosing in setting of tube feeds. Hyperglycemic in 400s this AM.  - Endocrine following, appreciate assistance  - Continue Lantus 10U at bedtime   - Now on non-DKA insulin drip given hyperglycemia w/ initiation of TF (plan to decrease rate to algorithm 1 at 2000 then stop one hour later per Endo note). She will then get NPH 20 units at 2000  - HOLD NPH 4 units AM (on hold as on insulin drip for now)  - HOLD NPH 4-12 units PM depending on TF rate (on hold as on insulin drip for now)  - At 0000 will transition back to high sliding scale Q4H   - BG checks Q4H while NPO once transitioning back to subQ insulin at 0000  - Hypoglycemia protocol      Hypothyroidism  Most recent TSH 0.99. Repeat TSH here 0.14. On admission, daughter noted recent medication adjustments (brand synthroid vs generic) therefore may have been over-replaced. Cannot r/o sick euthyroid state as well.  -  continue PTA PO synthroid 88mcg   - Recheck TSH in 1-2 weeks when clinically stable     Stage IIIb CKD  Baseline Cr of 1.4-1.6. Cr improved to 1.3 with IVF. Suspect chronic dehydration contributing.  - BMP daily for now  - Avoid nephrotoxic agents as best as possible     HTN  HLD  -180 on admission. Can allow for permissive hypertension in setting of acute stroke per Stroke Neuro.  - Continue  PTA carvedilol  - continue PTA atorvastatin  -  aspirin while NPO  - IV hydralazine PRN for SBP>180     Chronic Low Back Pain  - Diclofenac gel PRN  - continue PTA duloxetine, gabapentin    Seasonal allergies  - Hold PTA loratidine while NPO            Diet: NPO for Medical/Clinical Reasons Except for: No Exceptions  Adult Formula Drip Feeding: Continuous Osmolite 1.5; Nasogastric tube; Goal Rate: 40; mL/hr; Initiate at 10ml/hr and advance by 10ml Q8H as tolerated.; Do not advance tube feeding rate unless K+ is = or > 3.0, Mg++ is = or > 1.5, and Phos is = or ...    DVT Prophylaxis: Pneumatic Compression Devices  Parker Catheter: Not present  Lines: None     Cardiac Monitoring: None  Code Status: No CPR- Do NOT Intubate      Clinically Significant Risk Factors          # Hyperchloremia: Highest Cl = 109 mmol/L in last 2 days, will monitor as appropriate          # Hypoalbuminemia: Lowest albumin = 3.3 g/dL at 5/11/2025  7:01 AM, will monitor as appropriate     # Hypertension: Noted on problem list     # Dementia: noted on problem list            # Financial/Environmental Concerns:           Social Drivers of Health    Food Insecurity: Unknown (3/25/2025)    Food Insecurity     Within the past 12 months, did you worry that your food would run out before you got money to buy more?: Patient unable to answer     Within the past 12 months, did the food you bought just not last and you didn t have money to get more?: Patient unable to answer   Depression: At risk (4/2/2025)    PHQ-2     PHQ-2 Score: 4   Housing Stability: Unknown (3/25/2025)    Housing Stability     Do you have housing? : Patient unable to answer     Are you worried about losing your housing?: Patient unable to answer   Tobacco Use: Medium Risk (4/24/2025)    Patient History     Smoking Tobacco Use: Former     Smokeless Tobacco Use: Never   Financial Resource Strain: Unknown (3/25/2025)    Financial Resource Strain     Within the past 12 months, have you  or your family members you live with been unable to get utilities (heat, electricity) when it was really needed?: Patient unable to answer   Transportation Needs: Unknown (3/25/2025)    Transportation Needs     Within the past 12 months, has lack of transportation kept you from medical appointments, getting your medicines, non-medical meetings or appointments, work, or from getting things that you need?: Patient unable to answer   Interpersonal Safety: Unknown (3/25/2025)    Interpersonal Safety     Do you feel physically and emotionally safe where you currently live?: Patient unable to answer     Within the past 12 months, have you been hit, slapped, kicked or otherwise physically hurt by someone?: Patient unable to answer     Within the past 12 months, have you been humiliated or emotionally abused in other ways by your partner or ex-partner?: Patient unable to answer          Disposition Plan     Medically Ready for Discharge: Anticipated in 5+ Days           The patient's care was discussed with the Attending Physician, Dr. Lubna Roman, Bedside Nurse, Patient, and Endocrinology Consultant(s).    Jimmy Moore PA-C  Hospitalist Service, GOLD TEAM [address] [place] [address]  Securely message with [employer] (more info)  Text page via [facility] Paging/Directory   See signed in provider for up to date coverage information  ______________________________________________________________________    Interval History   Pt seen in her room this AM. Stirs to loud voice, but not interactive today d/t sleepiness. Bedside attendant reports no acute concerns this AM, and not witnessing any pulling of her lines today. No observed vomiting, coughing, or complaints of nausea with tube feeds.    Bedside attendant reports that the patient was awake until about 4am last night and then fell asleep shortly thereafter.    Physical Exam   Vital Signs: Temp: 99  F (37.2  C) Temp src: Oral BP: (!) 146/63 Pulse:  92   Resp: 20 SpO2: 95 % O2 Device: None (Room air)    Weight: 121 lbs 11.2 oz    Constitutional: somnolent and resting in bed, cooperativeness not assessed as sleeping on exam, no apparent distress, and appears stated age  Eyes: lids and lashes normal  ENT: normocephalic, without obvious abnormality. NJ present at nare, no observed bleeding.  Respiratory: No increased work of breathing, good air exchange, clear to auscultation bilaterally, no crackles or wheezing  Cardiovascular: regular rate and rhythm, normal S1 and S2, no S3, no S4, and no murmur noted  GI: normal bowel sounds, soft, non-distended, and non-tender  Skin: no bruising or bleeding, no redness, warmth, or swelling, no rashes, and no lesions on visualized skin.   Musculoskeletal: no lower extremity pitting edema present, no deformities, no pain response to calf palpation.  Neurologic: Moving all extremities equally and spontaneously when stirring to voice. No obvious focal neuro deficits.  Neuropsychiatric: General: calm  Level of consciousness: lethargic and sleeping    Medical Decision Making       95 MINUTES SPENT BY ME on the date of service doing chart review, history, exam, documentation & further activities per the note.      Data     I have personally reviewed the following data over the past 24 hrs:    5.9  \   9.8 (L)   / 192     138 106 18.5 /  194 (H)   4.1 21 (L) 1.43 (H) \       Imaging results reviewed over the past 24 hrs:   No results found for this or any previous visit (from the past 24 hours).  Recent Labs   Lab 05/13/25  1031 05/13/25  0927 05/13/25  0750 05/13/25  0631 05/12/25  0749 05/12/25  0554 05/11/25  0752 05/11/25  0701 05/07/25  1337 05/07/25  1329   WBC  --   --   --  5.9  --  6.3  --  7.2   < > 5.7   HGB  --   --   --  9.8*  --  11.2*  --  11.2*   < > 10.0*   MCV  --   --   --  84  --  86  --  87   < > 90   PLT  --   --   --  192  --  147*  --  220   < > 222   INR  --   --   --   --   --   --   --   --   --  1.00   NA   --   --   --  138  --  140  --  140   < > 138   POTASSIUM  --   --   --  4.1  --  3.8  --  3.6   < > 4.9   CHLORIDE  --   --   --  106  --  109*  --  109*   < > 106   CO2  --   --   --  21*  --  16*  --  22   < > 19*   BUN  --   --   --  18.5  --  14.4  --  11.8   < > 37.2*   CR  --   --   --  1.43*  --  1.30*  --  1.21*   < > 1.59*   ANIONGAP  --   --   --  11  --  15  --  9   < > 13   VERONICA  --   --   --  8.5*  --  8.9  --  8.8   < > 8.9   * 338* 427* 437*   < > 144*   < > 161*   < > 413*   ALBUMIN  --   --   --   --   --   --   --  3.3*  --  3.7   PROTTOTAL  --   --   --   --   --   --   --  6.0*  --  6.3*   BILITOTAL  --   --   --   --   --   --   --  0.3  --  0.3   ALKPHOS  --   --   --   --   --   --   --  209*  --  208*   ALT  --   --   --   --   --   --   --  16  --  22   AST  --   --   --   --   --   --   --  26  --  28    < > = values in this interval not displayed.

## 2025-05-12 NOTE — PROGRESS NOTES
Inpatient Diabetes Management Service: Daily Progress Note     HPI: Isabell Matthews is a 64 year old female with history of HTN, HLD, DM type I, diabetic neuropathy, CKD3b, orthostatic hypotension, seizures, vascular dementia, hypothyroidism, recurrent UTI's, and freuqent falls who was admitted to [facility] with concern for stroke after presenting with acute neurologic symptoms, MRI with acute infarcts within R corona radiata, precentral gyrus and operculum, though presenting symptoms do not completely explain focal deficits and not a candidate for advanced therapies. Hospital course complicated by dysphagia and now NPO per SLP with plan to start tube feedings. IDS consulted for assistance with glycemic management.          Assessment/Plan:     Assessment:   Type 1 Diabetes Mellitus with DEMETRIA elevated at 6.2 and C-peptide=<0.1 on 6/17/2024, complicated by peripheral neuropathy, nephropathy, hx of DKA and hypoglycemia. Sub-optimal control. (A1c 8.8 % 3/23/25, Hgb: 10.5) goal A1c more appropriately 7.5%.  Vascular dementia with acute neurologic changes and new dysphagia- daughter is caregiver and manages insulin/medications   Tube feedings - pending start     Plan/Recommendations:     - Lantus 10 units q 24 hrs at 2200   - AM NPH 4 units at 1000 with tube feeds at 10 ml/hr   - PM NPH 4-12 units at 2200 with tube feeds pending TF rate   Give 4 units if TF at 10 ml/hr   Give 8 units if TF at 20 ml/hr    Give 12 units if TF at 30 ml/hr   - NO Novolog Meal Coverage: Patient strict NPO   - Novolog Correction Scale: Medium resistance (ISF: 50)  every 4 hours  - BG monitoring: every 4 hours   - Continue Dextrose fluids while NPO- stop once tube feeds are started.   - PRN D10 at 30 ml/hr if tube feeds are stopped/interrupted (dosed for Osmolite 1.5 (or equivalent) at 20 ml/hr) dosing provides 75% of CHO that would have been given by TF   - Hypoglycemia protocol    - Carb counting protocol     Per ADA guidelines,  treatment goals and recommendations for older adults with DM and medically complexity is less stringent BG control/A1c for safety - targets of 110-180 mg/dL and up to 250 mg/dL reasonable. Avoid reliance on A1c. Glucose decisions should be based on avoidance of hypoglycemia and symptomatic hyperglycemia.     Discussion:   Ongoing issues with tube feeds. Did start at 10 ml/hr again overnight and NPH given, however tube became clogged so patient was started on D10 at 30 ml/hr providing about 3 g CHO per hour with stable glucoses and NPH 6 units (ICR about 1:6, though not completely accurate between D10 and tube feeds). Per RN tube feed unclogged this AM,running at 10 ml/hr so NPH 4 units given, was later advanced to 20 ml/hr with patient reporting some fullness. Hyperglycemia with multiple changes and unknown plan/tolerance of tube feeds, though no indication to start IV insulin for now. Will provide a range for NPH dosing this evening given unknown TF rate/tolerance and ongoing clogging issues. Note that patient does seem to run higher during the day than overnight and may need higher NPH dosing, though have been unable to advance insulin plan with ongoing challenges with feeding tube. Will continue to monitor closely for stability and dose adjustments as needed.      Tube Feed Advancement: Osmolite 1.5 (or equivalent)  10 ml/hr provides 2.03 g CHO/hr   20 ml/hr provides 4.06 g CHO/hr   30 ml/hr provides 6.09 g CHO/hr   Goal: 40 ml/hr provides 8.125 g CHO/hr     Discharge Planning: (tentative)  Medications: TBD  - adjustment of PTA dosing with addition of NPH to cover tube feeds as indicated   Needs set doses for day program, would be able to use ICR at home.   Test Claims:  none needed.   Education:  Needs to be assessed closer to discharge.    Outpatient Follow-up: PCP- Dr Kellogg ( LOV 4/24/25), until able to establish with [facility]- has appointment with Mai Figueroa 8/4/25    Please notify  Inpatient Diabetes Service if changes are planned to steroids, nutrition, TPN/TF and anticipated procedures requiring prolonged NPO status.         Interval History/Review of Systems :   The last 24 hours progress and nursing notes reviewed.  Calm. Withdrawn, trying to mumble words but difficult to understand. No SLP evaluation today, remains NPO other than TF.   Multiple instances of TF clogging yesterday and through the night.     Planned Procedures/Surgeries: none    Inpatient Glucose Control:       Recent Labs   Lab 05/12/25  0411 05/12/25  0007 05/11/25  1941 05/11/25  1610 05/11/25  1201 05/11/25  0752   * 204* 254* 268* 265* 153*             Medications Impacting Glycemia:   Steroids: none  D5W containing solutions/medications: D5 1/2 NS at 75 ml/hr while NPO - stopped ; PRN D10 if TF held   Other medications impacting glucose: none        Nutrition:   Orders Placed This Encounter      NPO for Medical/Clinical Reasons Except for: No Exceptions    Supplements:  none  TF:   5/10: will start Osmolite 1.5 (or equivalent) at 10 ml.hr to advance by 10ml every 8 hours (goal 40 ml/hr provides 195 CHO)   Unable to advance due to tube feed being clogged     TPN: none         Diabetes History: see full consult note for complete diabetes history   Diabetes Type and Duration: DM for > 40 years, diagnosed in her 40s. Per daughter,  initially told she has T2DM then T1DM for a while then flipped back to being told T2DM.      6/17/2024 positive GAD65 antibody and c-peptide <0.1 but BG elevated     PTA Medication Regimen:   From discharge 3/30/25:- daughter not present to confirm   - Lantus 18 units once daily at HS  - Lispro ICR 1:15 TID AC, 1:20 PRN with snacks/supplements  - Lispro correction 1:50>150 TID AC, >200 HS     At discharge had discussed challenges around glucose lability and dosing: Lantus dose of 18 units may be too high if patient is not eating. However, daughter notes that with more dramatic changes  patient will sometimes rise to 400s and has been in DKA before which is hard to balance. If she is concerned about mom going low at night, will try to give her a snack to support Lantus dosing. She is working to resume CGM to more closely monitor blood sugars.  Missing doses?: unknown, daughter not present to confirm   Historical Diabetes Medications: various insulins and dosing      Glucose monitoring device/frequency/trends: Historically has used glucometer before meals and at bedtime. Has tried and failed multiple CGMs (issues with accuracy or patient picking CGM off).   Current trends unknown as daughter not present to discuss- historically known to be labile with needs dropping dramatically when NPO    Hypoglycemia PTA:   - Frequency: currently unknown, but historically labile.   - Severity: + history of severe unconscious lows   - Awareness: variable, not intact    - Treatment: candy, juice       Outpatient Diabetes Provider: Current PCP: Dr Kellogg ( LOV 4/24/25) ; seen by Dr Bony Castaneda in the past (LOV 10/7/24)   Endo referral placed by PCP and IDS last admission, Scheduled with NEGRA Ramirez 8/4/25   Formal Diabetes Education/Educator: none recently        Physical Exam:   BP (!) 149/96 (BP Location: Left arm)   Pulse 100   Temp 97.8  F (36.6  C) (Oral)   Resp 18   Wt 55.2 kg (121 lb 11.2 oz)   SpO2 97%   BMI 21.56 kg/m      General Appearance: Confused, lethargic, but calm   HEENT: Normocephalic, atraumatic.   Lungs:  Breathing comfortably   Abdomen: Round, nondistended.   Extremities:  No significant  lower extremity edema.   Skin: No obvious rashes, lesions or bruising.   Neuro: disoriented x4 at baseline    Psych:  Calm         Data:     Lab Results   Component Value Date    A1C 8.8 (H) 03/23/2025    A1C 9.6 (H) 01/16/2025    A1C 11.2 (H) 09/23/2024    A1C 10.2 (H) 05/20/2024    A1C 10.7 (H) 01/29/2024    A1C 7.8 (H) 06/21/2021    A1C 11.7 (H) 09/27/2020       ROUTINE IP LABS (Last four  results)  BMP  Recent Labs   Lab 05/12/25  0411 05/12/25  0007 05/11/25  1941 05/11/25  1610 05/11/25  0752 05/11/25  0701 05/10/25  1959 05/10/25  1735 05/10/25  1221 05/10/25  0817 05/09/25  1222 05/09/25  0752 05/08/25  0803 05/08/25  0646   NA  --   --   --   --   --  140  --   --   --  142  --  147*  --  143   POTASSIUM  --   --   --   --   --  3.6  --  3.8  --  3.6  --  3.9  --  4.2   CHLORIDE  --   --   --   --   --  109*  --   --   --  112*  --  115*  --  110*   VERNOICA  --   --   --   --   --  8.8  --   --   --  8.9  --  8.9  --  9.0   CO2  --   --   --   --   --  22  --   --   --  20*  --  20*  --  18*   BUN  --   --   --   --   --  11.8  --   --   --  19.7  --  30.7*  --  34.4*   CR  --   --   --   --   --  1.21*  --   --   --  1.38*  --  1.68*  --  1.60*   * 204* 254* 268*   < > 161*   < >  --    < > 152*  166*   < > 77   < > 243*    < > = values in this interval not displayed.     CBC  Recent Labs   Lab 05/11/25  0701 05/10/25  0817 05/09/25  0752 05/08/25  0646   WBC 7.2 8.1 8.4 6.1   RBC 4.14 4.06 3.63* 3.69*   HGB 11.2* 10.9* 9.9* 9.9*   HCT 35.9 35.6 31.0* 32.4*   MCV 87 88 85 88   MCH 27.1 26.8 27.3 26.8   MCHC 31.2* 30.6* 31.9 30.6*   RDW 15.0 15.4* 15.7* 15.3*    242 236 253     INR  Recent Labs   Lab 05/07/25  1329   INR 1.00       Inpatient Diabetes Service will continue to follow, please don't hesitate to contact the team with any questions or concerns.     PEGGY Hill, CNP   Inpatient Diabetes Management Service   Pager: [phone]   Available on Vocera      Plan discussed with patient, bedside RN, and primary team via this note.    To contact Inpatient Diabetes Service:     7 AM - 5 PM: Page the IDS BULMARO following the patient that day (see filed or incomplete progress notes/consult notes under Endocrinology)    OR if uncertain of provider assignment: page job code [phone]    5 PM - 7 AM: First call after hours is to primary service.    For urgent after-hours questions, page  job code for on call fellow: [phone]     I spent a total of 40 minutes on the date of the encounter doing prep/post-work, chart review, history and exam, documentation and further activities per the note including lab review, multidisciplinary communication, counseling the patient and/or coordinating care regarding acute hyper/hypoglycemic management, as well as discharge management and planning/communication.

## 2025-05-12 NOTE — PROGRESS NOTES
Brief cross cover note:     Paged by RN regarding difficulty w/ TF. TF intermittently clogged throughout the night, intermittently able to give TF but now unable to run any. Holding TF for now, pt on insulin thus okay'd RN to start the PRN D10 infusion at 30cc/hr.      Duke Oates,  05/12/25 at 5:31 AM

## 2025-05-12 NOTE — PROGRESS NOTES
"   05/12/25 1702   Appointment Info   Signing Clinician's Name / Credentials (PT) Heavenly Helms, PT, DPT   Rehab Comments (PT) 1:1 at bedside       Present no   Living Environment   People in Home child(tiffany), adult   Current Living Arrangements apartment   Home Accessibility no concerns   Transportation Anticipated family or friend will provide   Living Environment Comments Per EMR from last admission: lives with daughter who is one of their PCAs   Self-Care   Usual Activity Tolerance fair   Current Activity Tolerance poor   Equipment Currently Used at Home walker, standard;wheelchair, manual   Fall history within last six months yes   Number of times patient has fallen within last six months 15  (10-15 as of 2 months ago per last admission, unable to assess accurate number at time of eval)   Activity/Exercise/Self-Care Comment Per EMR from last admission: Ambulates with FWW in apt, manual wc for longer distances. Has 24/7 A from daughter and another PCA. (this info verified by yes/no questions at time of eval 5/12/25). Per pt, gets help with ADLs like getting dressed, toileting, showers. Limited report at this date d/t pts slurred speech/dysarthria   General Information   Onset of Illness/Injury or Date of Surgery 05/07/25   Patient/Family Therapy Goals Statement (PT) did not formally endorse w/ pt   Pertinent History of Current Problem (include personal factors and/or comorbidities that impact the POC) per chart \"64 year old woman with historyof HTN, HLD, CKD IIIb, T1DM c/b neuropathy, ischemic and hemorrhagic strokes (right basal ganglia ischemic infarct in 2018, ICAD left basal ganglia hemorrhagic stroke 2021, right centrum semiovale & right chuck 2023) with residual R facial droop and vascular dementia, seizures 2/2 DKA, and recurrent UTI who is currently admitted with encephalopathy, FTT, and slight worsening of baseline stroke symptoms. She was found to have multiple small acute " "infarcts involving the right frontal lobe near her chronic encephalomalacia from prior stroke, which may partially explain her slight worsening of baseline symptoms.\"   Existing Precautions/Restrictions fall   General Observations Pt supine in bed upon arrival, 1:1 at bedside, pt w/ L wrist splint on, agreeable to PT. Per sitter in room, pts daugther recently left for the day but tried to wait for PT.   Cognition   Affect/Mental Status (Cognition) flat/blunted affect   Orientation Status (Cognition) oriented x 3   Follows Commands (Cognition) increased processing time needed   Cognitive Status Comments Pt able to answer yes/no questions throughout session, responds appropriately when given 2 choices for an answer such as long distance or short distance for when she uses FWW for ambulation, etc   Pain Assessment   Patient Currently in Pain No   Integumentary/Edema   Integumentary/Edema Comments NG tube in place, small scratch on L knee that pt pointed out to writer, scabbed over   Posture    Posture Forward head position;Protracted shoulders   Range of Motion (ROM)   Range of Motion ROM deficits secondary to weakness   Strength (Manual Muscle Testing)   Strength (Manual Muscle Testing) Deficits observed during functional mobility   Strength Comments mild LE strength deficits observed on L LE compared to R LE   Bed Mobility   Comment, (Bed Mobility) modA supine<>sit   Transfers   Comment, (Transfers) modA STS to FWW   Gait/Stairs (Locomotion)   Distance in Feet (Gait) 2   Comment, (Gait/Stairs) side stepping at bedside, Danny for balance and walker navigation   Balance   Balance Comments CGA-Danny seated balance, modA standing static balance w/ FWW   Sensory Examination   Sensory Perception patient reports no sensory changes   Coordination   Coordination Comments completes finger to nose and heel to knee with fluid but slow motions   Muscle Tone   Muscle Tone no deficits were identified   Clinical Impression   Criteria " for Skilled Therapeutic Intervention Yes, treatment indicated   PT Diagnosis (PT) impaired functional mobility   Influenced by the following impairments decreased activity tolerance, strength, recent CVA   Functional limitations due to impairments impaired bed mobility, transfers, gait   Clinical Presentation (PT Evaluation Complexity) stable   Clinical Presentation Rationale per clinical judgement   Clinical Decision Making (Complexity) low complexity   Planned Therapy Interventions (PT) balance training;bed mobility training;gait training;home exercise program;patient/family education;orthotic fitting/training;strengthening;transfer training;progressive activity/exercise;risk factor education;home program guidelines;wheelchair management/propulsion training   Risk & Benefits of therapy have been explained evaluation/treatment results reviewed;care plan/treatment goals reviewed;risks/benefits reviewed;current/potential barriers reviewed;participants voiced agreement with care plan;participants included;patient;daughter   PT Total Evaluation Time   PT Eval, Low Complexity Minutes (09874) 7   Physical Therapy Goals   PT Frequency Daily   PT Predicted Duration/Target Date for Goal Attainment 05/16/25   PT Goals Bed Mobility;Transfers;Gait   PT: Bed Mobility Minimal assist;Supine to/from sit   PT: Transfers Supervision/stand-by assist;Sit to/from stand;Bed to/from chair;Assistive device   PT: Gait Minimal assist;Assistive device;25 feet   Therapeutic Activity   Therapeutic Activities: dynamic activities to improve functional performance Minutes (56693) 28   Symptoms Noted During/After Treatment Fatigue   Treatment Detail/Skilled Intervention Treatment indicated to progress functional mobility. Sitter at bedside checking glucose before mobiliyt; 360, sitter reported to RN and RN providing intervention. BP checked, 164/78, RN okay'd mobility. Pt able to roll side to side w/ instructions, see eval for bed mob. VC to scoot  toward EOB, intitially pt unable, modA at hips to scoot pt forward, once LE on ground, pt able to scoot self toward EOB w/ UE use. Sitter providing CGA at pt trunk throughout. STS from bed to FWW at modAx2, heavy posterior lean of LE on bed upon standing, modAx1 to maintain standing, cued for toes on ground and flexing knees for improved posture and alignment, no follow through observed. Writer needing to place L UE on FWW handle as pt leaves UE by side. Instructions to complete marching in place, pt completes x5 each side, minimal foot clearance. Pt with improved posture when marching, Danny for balance throughout. Stand>sit Danny d/t pt fatigue. STS minAx2, side stepping L toward HOB x5 steps, assist for walker management. Stand>sit Danny, sit>supine minAx2. Dep boost up in bed Ax2. Ended session with needs met, 1:1 at bedside.   PT Discharge Planning   PT Plan bed mob, SaraSteady transfers, progress to amb w/ close chair follow   PT Discharge Recommendation (DC Rec) home with assist;home with home care physical therapy;Transitional Care Facility   PT Rationale for DC Rec At this time, unclear on pts current baseline d/t dysarthria and daughter not present in session who is pts PCA. Pending baseline and assist level, w/ IP therapies, pt may be able to progress toward d/c home w/ assist and HHPT pending level of assist that can be provided. TCU rec in place pending level of assist pt has at home. TCU would be beneficial to progress strength, safety, and functional mobility   PT Brief overview of current status modA bed mob, modAx1-2 STS, side steppin at EOB w/ FWW Danny   PT Total Distance Amb During Session (feet) 2   Physical Therapy Time and Intention   Timed Code Treatment Minutes 28   Total Session Time (sum of timed and untimed services) 35

## 2025-05-12 NOTE — PLAN OF CARE
7750 - 1930    Goal Outcome Evaluation:      Plan of Care Reviewed With: patient    Overall Patient Progress: no change    Pt is alert and able to follow commands intermittently, answers yes/no questions. PT had patient stand with walker today. Purewick in place. NG tube flushed and obstruction resolved around 1100. TF pump restarted at 11, running @ 20.  D10 infusion stopped. 1:1 sitter remains at bedside for line pulling.

## 2025-05-13 ENCOUNTER — APPOINTMENT (OUTPATIENT)
Dept: GENERAL RADIOLOGY | Facility: CLINIC | Age: 65
End: 2025-05-13
Attending: PHYSICIAN ASSISTANT
Payer: COMMERCIAL

## 2025-05-13 DIAGNOSIS — M25.532 WRIST PAIN, LEFT: Primary | ICD-10-CM

## 2025-05-13 LAB
ANION GAP SERPL CALCULATED.3IONS-SCNC: 11 MMOL/L (ref 7–15)
B-OH-BUTYR SERPL-SCNC: <0.18 MMOL/L
BASOPHILS # BLD AUTO: 0 10E3/UL (ref 0–0.2)
BASOPHILS NFR BLD AUTO: 1 %
BUN SERPL-MCNC: 18.5 MG/DL (ref 8–23)
CALCIUM SERPL-MCNC: 8.5 MG/DL (ref 8.8–10.4)
CHLORIDE SERPL-SCNC: 106 MMOL/L (ref 98–107)
CREAT SERPL-MCNC: 1.43 MG/DL (ref 0.51–0.95)
EGFRCR SERPLBLD CKD-EPI 2021: 40 ML/MIN/1.73M2
EOSINOPHIL # BLD AUTO: 0.1 10E3/UL (ref 0–0.7)
EOSINOPHIL NFR BLD AUTO: 2 %
ERYTHROCYTE [DISTWIDTH] IN BLOOD BY AUTOMATED COUNT: 15.4 % (ref 10–15)
GLUCOSE BLDC GLUCOMTR-MCNC: 150 MG/DL (ref 70–99)
GLUCOSE BLDC GLUCOMTR-MCNC: 154 MG/DL (ref 70–99)
GLUCOSE BLDC GLUCOMTR-MCNC: 159 MG/DL (ref 70–99)
GLUCOSE BLDC GLUCOMTR-MCNC: 162 MG/DL (ref 70–99)
GLUCOSE BLDC GLUCOMTR-MCNC: 163 MG/DL (ref 70–99)
GLUCOSE BLDC GLUCOMTR-MCNC: 169 MG/DL (ref 70–99)
GLUCOSE BLDC GLUCOMTR-MCNC: 170 MG/DL (ref 70–99)
GLUCOSE BLDC GLUCOMTR-MCNC: 175 MG/DL (ref 70–99)
GLUCOSE BLDC GLUCOMTR-MCNC: 178 MG/DL (ref 70–99)
GLUCOSE BLDC GLUCOMTR-MCNC: 179 MG/DL (ref 70–99)
GLUCOSE BLDC GLUCOMTR-MCNC: 190 MG/DL (ref 70–99)
GLUCOSE BLDC GLUCOMTR-MCNC: 194 MG/DL (ref 70–99)
GLUCOSE BLDC GLUCOMTR-MCNC: 210 MG/DL (ref 70–99)
GLUCOSE BLDC GLUCOMTR-MCNC: 319 MG/DL (ref 70–99)
GLUCOSE BLDC GLUCOMTR-MCNC: 338 MG/DL (ref 70–99)
GLUCOSE BLDC GLUCOMTR-MCNC: 392 MG/DL (ref 70–99)
GLUCOSE BLDC GLUCOMTR-MCNC: 415 MG/DL (ref 70–99)
GLUCOSE BLDC GLUCOMTR-MCNC: 426 MG/DL (ref 70–99)
GLUCOSE BLDC GLUCOMTR-MCNC: 427 MG/DL (ref 70–99)
GLUCOSE SERPL-MCNC: 437 MG/DL (ref 70–99)
HCO3 SERPL-SCNC: 21 MMOL/L (ref 22–29)
HCT VFR BLD AUTO: 30.6 % (ref 35–47)
HGB BLD-MCNC: 9.8 G/DL (ref 11.7–15.7)
IMM GRANULOCYTES # BLD: 0 10E3/UL
IMM GRANULOCYTES NFR BLD: 1 %
LYMPHOCYTES # BLD AUTO: 1.1 10E3/UL (ref 0.8–5.3)
LYMPHOCYTES NFR BLD AUTO: 18 %
MCH RBC QN AUTO: 26.8 PG (ref 26.5–33)
MCHC RBC AUTO-ENTMCNC: 32 G/DL (ref 31.5–36.5)
MCV RBC AUTO: 84 FL (ref 78–100)
MONOCYTES # BLD AUTO: 0.4 10E3/UL (ref 0–1.3)
MONOCYTES NFR BLD AUTO: 6 %
NEUTROPHILS # BLD AUTO: 4.4 10E3/UL (ref 1.6–8.3)
NEUTROPHILS NFR BLD AUTO: 74 %
NRBC # BLD AUTO: 0 10E3/UL
NRBC BLD AUTO-RTO: 0 /100
PLATELET # BLD AUTO: 192 10E3/UL (ref 150–450)
POTASSIUM SERPL-SCNC: 4.1 MMOL/L (ref 3.4–5.3)
RBC # BLD AUTO: 3.65 10E6/UL (ref 3.8–5.2)
SODIUM SERPL-SCNC: 138 MMOL/L (ref 135–145)
WBC # BLD AUTO: 5.9 10E3/UL (ref 4–11)

## 2025-05-13 PROCEDURE — 250N000011 HC RX IP 250 OP 636: Mod: JZ

## 2025-05-13 PROCEDURE — 36415 COLL VENOUS BLD VENIPUNCTURE: CPT | Performed by: PHYSICIAN ASSISTANT

## 2025-05-13 PROCEDURE — 120N000002 HC R&B MED SURG/OB UMMC

## 2025-05-13 PROCEDURE — 258N000003 HC RX IP 258 OP 636: Performed by: NURSE PRACTITIONER

## 2025-05-13 PROCEDURE — 85004 AUTOMATED DIFF WBC COUNT: CPT | Performed by: PHYSICIAN ASSISTANT

## 2025-05-13 PROCEDURE — 36415 COLL VENOUS BLD VENIPUNCTURE: CPT | Performed by: NURSE PRACTITIONER

## 2025-05-13 PROCEDURE — 99232 SBSQ HOSP IP/OBS MODERATE 35: CPT | Mod: 24 | Performed by: NURSE PRACTITIONER

## 2025-05-13 PROCEDURE — 85014 HEMATOCRIT: CPT | Performed by: PHYSICIAN ASSISTANT

## 2025-05-13 PROCEDURE — 82010 KETONE BODYS QUAN: CPT | Performed by: NURSE PRACTITIONER

## 2025-05-13 PROCEDURE — 74230 X-RAY XM SWLNG FUNCJ C+: CPT | Mod: 26 | Performed by: RADIOLOGY

## 2025-05-13 PROCEDURE — 250N000009 HC RX 250: Performed by: NURSE PRACTITIONER

## 2025-05-13 PROCEDURE — 99207 PR APP CREDIT; MD BILLING SHARED VISIT: CPT

## 2025-05-13 PROCEDURE — 99233 SBSQ HOSP IP/OBS HIGH 50: CPT | Mod: FS | Performed by: STUDENT IN AN ORGANIZED HEALTH CARE EDUCATION/TRAINING PROGRAM

## 2025-05-13 PROCEDURE — 250N000013 HC RX MED GY IP 250 OP 250 PS 637

## 2025-05-13 PROCEDURE — 80048 BASIC METABOLIC PNL TOTAL CA: CPT | Performed by: PHYSICIAN ASSISTANT

## 2025-05-13 PROCEDURE — 250N000013 HC RX MED GY IP 250 OP 250 PS 637: Performed by: PHYSICIAN ASSISTANT

## 2025-05-13 PROCEDURE — 250N000009 HC RX 250: Mod: JZ | Performed by: STUDENT IN AN ORGANIZED HEALTH CARE EDUCATION/TRAINING PROGRAM

## 2025-05-13 PROCEDURE — 82565 ASSAY OF CREATININE: CPT | Performed by: PHYSICIAN ASSISTANT

## 2025-05-13 PROCEDURE — 74230 X-RAY XM SWLNG FUNCJ C+: CPT

## 2025-05-13 PROCEDURE — 250N000009 HC RX 250: Performed by: STUDENT IN AN ORGANIZED HEALTH CARE EDUCATION/TRAINING PROGRAM

## 2025-05-13 RX ORDER — CARVEDILOL 3.12 MG/1
6.25 TABLET ORAL 2 TIMES DAILY WITH MEALS
Status: DISCONTINUED | OUTPATIENT
Start: 2025-05-13 | End: 2025-05-29

## 2025-05-13 RX ORDER — GABAPENTIN 100 MG/1
100 CAPSULE ORAL AT BEDTIME
Status: DISCONTINUED | OUTPATIENT
Start: 2025-05-13 | End: 2025-05-29

## 2025-05-13 RX ORDER — LEVOTHYROXINE SODIUM 88 UG/1
88 TABLET ORAL
Status: DISCONTINUED | OUTPATIENT
Start: 2025-05-14 | End: 2025-06-11

## 2025-05-13 RX ORDER — DEXTROSE MONOHYDRATE 100 MG/ML
INJECTION, SOLUTION INTRAVENOUS CONTINUOUS PRN
Status: DISCONTINUED | OUTPATIENT
Start: 2025-05-13 | End: 2025-06-12 | Stop reason: HOSPADM

## 2025-05-13 RX ORDER — ATORVASTATIN CALCIUM 40 MG/1
40 TABLET, FILM COATED ORAL DAILY
Status: DISCONTINUED | OUTPATIENT
Start: 2025-05-14 | End: 2025-05-29

## 2025-05-13 RX ORDER — SODIUM CHLORIDE 9 MG/ML
INJECTION, SOLUTION INTRAVENOUS CONTINUOUS
Status: DISCONTINUED | OUTPATIENT
Start: 2025-05-13 | End: 2025-05-14

## 2025-05-13 RX ORDER — BARIUM SULFATE 400 MG/ML
SUSPENSION ORAL ONCE
Status: COMPLETED | OUTPATIENT
Start: 2025-05-13 | End: 2025-05-13

## 2025-05-13 RX ADMIN — GABAPENTIN 100 MG: 100 CAPSULE ORAL at 21:37

## 2025-05-13 RX ADMIN — INSULIN GLARGINE 10 UNITS: 100 INJECTION, SOLUTION SUBCUTANEOUS at 21:49

## 2025-05-13 RX ADMIN — LEVOTHYROXINE SODIUM 66 MCG: 20 INJECTION, SOLUTION INTRAVENOUS at 10:43

## 2025-05-13 RX ADMIN — DICLOFENAC SODIUM 2 G: 10 GEL TOPICAL at 09:42

## 2025-05-13 RX ADMIN — DICLOFENAC SODIUM 2 G: 10 GEL TOPICAL at 14:26

## 2025-05-13 RX ADMIN — Medication 1000 MCG: at 12:51

## 2025-05-13 RX ADMIN — DICLOFENAC SODIUM 2 G: 10 GEL TOPICAL at 18:34

## 2025-05-13 RX ADMIN — SODIUM CHLORIDE: 0.9 INJECTION, SOLUTION INTRAVENOUS at 22:41

## 2025-05-13 RX ADMIN — SODIUM CHLORIDE: 0.9 INJECTION, SOLUTION INTRAVENOUS at 09:40

## 2025-05-13 RX ADMIN — Medication 20 MG: at 12:51

## 2025-05-13 RX ADMIN — LEVETIRACETAM 500 MG: 5 INJECTION INTRAVENOUS at 06:02

## 2025-05-13 RX ADMIN — BARIUM SULFATE 20 ML: 400 SUSPENSION ORAL at 15:58

## 2025-05-13 RX ADMIN — DICLOFENAC SODIUM 2 G: 10 GEL TOPICAL at 21:36

## 2025-05-13 RX ADMIN — INSULIN HUMAN 1 UNITS/HR: 1 INJECTION, SOLUTION INTRAVENOUS at 12:50

## 2025-05-13 RX ADMIN — LEVETIRACETAM 500 MG: 5 INJECTION INTRAVENOUS at 21:35

## 2025-05-13 RX ADMIN — ASPIRIN 300 MG: 300 SUPPOSITORY RECTAL at 09:42

## 2025-05-13 RX ADMIN — INSULIN HUMAN 4.5 UNITS/HR: 1 INJECTION, SOLUTION INTRAVENOUS at 09:31

## 2025-05-13 RX ADMIN — Medication 5 MG: at 21:37

## 2025-05-13 RX ADMIN — INSULIN HUMAN 1.5 UNITS/HR: 1 INJECTION, SOLUTION INTRAVENOUS at 11:36

## 2025-05-13 ASSESSMENT — ACTIVITIES OF DAILY LIVING (ADL)
ADLS_ACUITY_SCORE: 76
ADLS_ACUITY_SCORE: 76
ADLS_ACUITY_SCORE: 77
ADLS_ACUITY_SCORE: 76
ADLS_ACUITY_SCORE: 82
ADLS_ACUITY_SCORE: 76
ADLS_ACUITY_SCORE: 82
ADLS_ACUITY_SCORE: 76
ADLS_ACUITY_SCORE: 76
ADLS_ACUITY_SCORE: 77
ADLS_ACUITY_SCORE: 76
ADLS_ACUITY_SCORE: 82
ADLS_ACUITY_SCORE: 76
ADLS_ACUITY_SCORE: 77
ADLS_ACUITY_SCORE: 76
ADLS_ACUITY_SCORE: 76
ADLS_ACUITY_SCORE: 82
ADLS_ACUITY_SCORE: 76
ADLS_ACUITY_SCORE: 82
ADLS_ACUITY_SCORE: 77

## 2025-05-13 NOTE — PROGRESS NOTES
SLP: Attempted video swallow evaluation. Pt initially awake when arrived in radiology, but closed eyes as study was starting and did not follow commands or become more alert. PO not offered d/t safety concerns. Recommend continue NPO with TF. Will plan to see pt at bedside 5/14 as appropriate.

## 2025-05-13 NOTE — PLAN OF CARE
Shift Hours: 1900 - 0700    Assessment:  Body systems that were not at patient's baseline Cognitive/Behavioral/Neuro, Respiratory, Gastrointestinal, Genitourinary, Skin, and Musculoskeletal. Focused body system assessments documented in flowsheets.        Activity     Fall Risk Score: 75   Bed alarm on? Yes     Activity Assistance Provided: assistance, 1 person      Assistive Device Utilized: gait belt, walker    Pain: FOZIA    Labs/RN Managed Protocols: x    Lines/Drains: R PIV TKO, NG at 86    Nutrition: strict NPO. PO meds on hold.     Goal Outcome Evaluation  Plan of Care Reviewed With: patient  Overall Patient Progress: no change       On 1:1 sitter for line pulling. Observed ot pulling on Purewick when awake. Neuro q4h, pt only oriented to self. Garbled and soft speech. 0400 BG check was 426, corrected with aspart 7u and informed night provider, Duke Oates DO. BG recheck hourly x2 and report back to provider, gtt may need to start. TF increased from 30cc/hr to 40cc/hr ~goal at 0415.     BG ~ 0515: 415, 0615: 392. Per night coverage, to defer BG to day team.       Barriers to Discharge:   Failed swallow study, line pulling, elevated BG

## 2025-05-13 NOTE — PROGRESS NOTES
Inpatient Diabetes Management Service: Daily Progress Note     HPI: Isabell Matthews is a 64 year old female with history of HTN, HLD, DM type I, diabetic neuropathy, CKD3b, orthostatic hypotension, seizures, vascular dementia, hypothyroidism, recurrent UTI's, and freuqent falls who was admitted to [facility] with concern for stroke after presenting with acute neurologic symptoms, MRI with acute infarcts within R corona radiata, precentral gyrus and operculum, though presenting symptoms do not completely explain focal deficits and not a candidate for advanced therapies. Hospital course complicated by dysphagia and now NPO per SLP with plan to start tube feedings.     IDS consulted to assist with glycemic management as well as optimization while inpatient and when applicable, recommendations for transition home.         Assessment/Plan:     Assessment:  Type 1 Diabetes Mellitus c/b peripheral neuropathy, nephropathy, hx of DKA and hypoglycemia. Sub-optimal control. (A1c 8.8 % 3/23/25, Hgb: 10.5)  Vascular dementia with acute neurologic changes and new dysphagia  Acute hyperglycemia 2/2 Tube feedings  BMI: 22    Plan/Recommendations:    - Start IV insulin, non-DKA protocol stat   - Lantus 10 units q 24 hrs at 2200   - Novolog Correction Scale: Medium resistance (ISF: 50)  every 4 hours, then discontinue as IV insulin will start  - BG monitoring: every 4 hours, then q1-2 h per IV insulin protocol   - PRN D10 at 60 ml/hr if tube feeds are stopped/interrupted (dosed for Osmolite 1.5 (or equivalent) at 40 ml/hr) dosing provides 75% of CHO that would have been given by TF   - Hypoglycemia protocol    - Carb counting protocol     ADDM: 1:00 PM    - NPH 20 unit(s) at 2000, reduce IV insulin to Alg 1 rate 0.5 unit(s) per hour and 1 hour later STOP drip  - start Novolog Correction Scale: High resistance (ISF: 25)  every 4 hours at 0000  - start BG checks q4h at 0000 once off IV insulin drip at 0000       Per ADA  guidelines, treatment goals and recommendations for older adults with DM and medically complexity is less stringent BG control/A1c for safety - targets of 110-180 mg/dL and up to 250 mg/dL reasonable - albeit not persistent for those with T1DM. Avoid reliance on A1c. Glucose decisions should be based on avoidance of hypoglycemia and symptomatic hyperglycemia.     Discussion:     Patient with significantly elevated BG,  this AM and IV insulin ordered. Will maintain the correction scale until drip initiated, then discontinue.     Labs reassuring at this time, bicarb 21/Gap 11 and no ketosis.   Creat 1.43/GFR 40  K 4.1  WBC 5.9    Patient is not communicative. TF titrated to goal at 0415 this AM.     12:58 PM trending well on IV insulin drip - will plan to transition off at 2000, all orders updated.          Discharge Planning: (tentative)  Medications: TBD  - adjustment of PTA dosing with addition of NPH to cover tube feeds as indicated   Needs set doses for day program, would be able to use ICR at home.   Test Claims:  none needed.   Education:  Needs to be assessed closer to discharge.    Outpatient Follow-up: PCP- Dr Kellogg ( LOV 4/24/25), until able to establish with [facility]- has appointment with Mai Figueroa 8/4/25    Please notify Inpatient Diabetes Service if changes are planned to steroids, nutrition, TPN/TF and anticipated procedures requiring prolonged NPO status.         Interval History/Review of Systems :   The last 24 hours progress and nursing notes reviewed.    - resting in bed    Inpatient Glucose Control:         Planned Procedures/Surgeries: none    Recent Labs   Lab 05/13/25  0631 05/13/25  0617 05/13/25  0520 05/13/25  0358 05/13/25  0034 05/12/25  2206   * 392* 415* 426* 319* 257*           Medications Impacting Glycemia:   Steroids: none  D5W containing solutions/medications: none   Other medications impacting glucose: none        Nutrition:   Orders Placed This  Encounter      NPO for Medical/Clinical Reasons Except for: No Exceptions    Supplements:  none  TF:   5/10: will start Osmolite 1.5 (or equivalent) at 10 ml.hr to advance by 10ml every 8 hours (goal 40 ml/hr provides Osmolite 1.5 (or equivalent)  Goal: 40 ml/hr provides 8.16 g per hour or 195 CHO in 24 hours)   TPN: none         Diabetes History: see full consult note for complete diabetes history   Diabetes Type and Duration: DM for > 40 years, diagnosed in her 40s. Per daughter,  initially told she has T2DM then T1DM for a while then flipped back to being told T2DM.      6/17/2024 positive GAD65 antibody and c-peptide <0.1 but BG elevated     PTA Medication Regimen:   From discharge 3/30/25:- daughter not present to confirm   - Lantus 18 units once daily at HS  - Lispro ICR 1:15 TID AC, 1:20 PRN with snacks/supplements  - Lispro correction 1:50>150 TID AC, >200 HS     At discharge had discussed challenges around glucose lability and dosing: Lantus dose of 18 units may be too high if patient is not eating. However, daughter notes that with more dramatic changes patient will sometimes rise to 400s and has been in DKA before which is hard to balance. If she is concerned about mom going low at night, will try to give her a snack to support Lantus dosing. She is working to resume CGM to more closely monitor blood sugars.  Missing doses?: unknown, daughter not present to confirm   Historical Diabetes Medications: various insulins and dosing      Glucose monitoring device/frequency/trends: Historically has used glucometer before meals and at bedtime. Has tried and failed multiple CGMs (issues with accuracy or patient picking CGM off).   Current trends unknown as daughter not present to discuss- historically known to be labile with needs dropping dramatically when NPO    Hypoglycemia PTA:   - Frequency: currently unknown, but historically labile.   - Severity: + history of severe unconscious lows   - Awareness: variable,  not intact    - Treatment: candy, juice       Outpatient Diabetes Provider: Current PCP: Dr Kellogg ( LOV 4/24/25) ; seen by Dr Bony Castaneda in the past (LOV 10/7/24)   Endo referral placed by PCP and IDS last admission, Scheduled with Mai Figueroa PAC 8/4/25   Formal Diabetes Education/Educator: none recently        Physical Exam:   BP (!) 146/63 (BP Location: Left arm)   Pulse 92   Temp 99  F (37.2  C) (Oral)   Resp 20   Wt 55.2 kg (121 lb 11.2 oz)   SpO2 95%   BMI 21.56 kg/m    General Appearance: Confused, lethargic, but calm - not interactive   HEENT: Normocephalic, atraumatic.   Lungs:  Breathing comfortably   Abdomen: Round   Extremities:  No significant  lower extremity edema.   Skin: No obvious rashes, lesions or bruising.   Neuro: disoriented x4 at baseline    Psych:  Calm       Data:     Lab Results   Component Value Date    A1C 8.8 (H) 03/23/2025    A1C 9.6 (H) 01/16/2025    A1C 11.2 (H) 09/23/2024    A1C 10.2 (H) 05/20/2024    A1C 10.7 (H) 01/29/2024    A1C 7.8 (H) 06/21/2021    A1C 11.7 (H) 09/27/2020     ROUTINE IP LABS (Last four results)  BMP  Recent Labs   Lab 05/13/25  0631 05/13/25  0617 05/13/25  0520 05/13/25  0358 05/12/25  0749 05/12/25  0554 05/11/25  0752 05/11/25  0701 05/10/25  1959 05/10/25  1735 05/10/25  1221 05/10/25  0817     --   --   --   --  140  --  140  --   --   --  142   POTASSIUM 4.1  --   --   --   --  3.8  --  3.6  --  3.8  --  3.6   CHLORIDE 106  --   --   --   --  109*  --  109*  --   --   --  112*   VERONICA 8.5*  --   --   --   --  8.9  --  8.8  --   --   --  8.9   CO2 21*  --   --   --   --  16*  --  22  --   --   --  20*   BUN 18.5  --   --   --   --  14.4  --  11.8  --   --   --  19.7   CR 1.43*  --   --   --   --  1.30*  --  1.21*  --   --   --  1.38*   * 392* 415* 426*   < > 144*   < > 161*   < >  --    < > 152*  166*    < > = values in this interval not displayed.     CBC  Recent Labs   Lab 05/13/25  0631 05/12/25  0554 05/11/25  0701  05/10/25  0817   WBC 5.9 6.3 7.2 8.1   RBC 3.65* 4.16 4.14 4.06   HGB 9.8* 11.2* 11.2* 10.9*   HCT 30.6* 35.9 35.9 35.6   MCV 84 86 87 88   MCH 26.8 26.9 27.1 26.8   MCHC 32.0 31.2* 31.2* 30.6*   RDW 15.4* 15.2* 15.0 15.4*    147* 220 242     INR  Recent Labs   Lab 05/07/25  1329   INR 1.00     Inpatient Diabetes Service will continue to follow, please don't hesitate to contact the team with any questions or concerns.     Aster Brownlee, PEGGY-CNP, BC-[facility]   Inpatient Diabetes Service  Available on [employer] - when on duty.     To contact Inpatient Diabetes Service:     7 AM - 5 PM: Page the IDS BULMARO following the patient that day (see filed or incomplete progress notes/consult notes under Endocrinology)    OR if uncertain of provider assignment: page job code [phone]    5 PM - 7 AM: First call after hours is to primary service.    For urgent after-hours questions, page job code for on call fellow: [phone]     I spent a total of 45 minutes on the date of the encounter doing prep/post-work, chart review, history and exam, documentation and further activities per the note including lab review, multidisciplinary communication, counseling the patient and/or coordinating care regarding acute hyper/hypoglycemic management, as well as discharge management and planning/communication.  See note for details.      Please notify inpatient diabetes service if changes are planned to steroids, nutrition, or if procedures are planned requiring prolonged NPO status.Diabetes Management Team job code: [phone]

## 2025-05-14 ENCOUNTER — APPOINTMENT (OUTPATIENT)
Dept: OCCUPATIONAL THERAPY | Facility: CLINIC | Age: 65
DRG: 064 | End: 2025-05-14
Payer: COMMERCIAL

## 2025-05-14 ENCOUNTER — APPOINTMENT (OUTPATIENT)
Dept: SPEECH THERAPY | Facility: CLINIC | Age: 65
DRG: 064 | End: 2025-05-14
Payer: COMMERCIAL

## 2025-05-14 ENCOUNTER — APPOINTMENT (OUTPATIENT)
Dept: PHYSICAL THERAPY | Facility: CLINIC | Age: 65
DRG: 064 | End: 2025-05-14
Payer: COMMERCIAL

## 2025-05-14 LAB
ALBUMIN SERPL BCG-MCNC: 3 G/DL (ref 3.5–5.2)
ALP SERPL-CCNC: 233 U/L (ref 40–150)
ALT SERPL W P-5'-P-CCNC: 15 U/L (ref 0–50)
ANION GAP SERPL CALCULATED.3IONS-SCNC: 10 MMOL/L (ref 7–15)
AST SERPL W P-5'-P-CCNC: 25 U/L (ref 0–45)
BASOPHILS # BLD AUTO: 0 10E3/UL (ref 0–0.2)
BASOPHILS NFR BLD AUTO: 1 %
BILIRUB SERPL-MCNC: <0.2 MG/DL
BUN SERPL-MCNC: 21.9 MG/DL (ref 8–23)
CALCIUM SERPL-MCNC: 8.2 MG/DL (ref 8.8–10.4)
CHLORIDE SERPL-SCNC: 104 MMOL/L (ref 98–107)
CREAT SERPL-MCNC: 1.44 MG/DL (ref 0.51–0.95)
EGFRCR SERPLBLD CKD-EPI 2021: 40 ML/MIN/1.73M2
EOSINOPHIL # BLD AUTO: 0.2 10E3/UL (ref 0–0.7)
EOSINOPHIL NFR BLD AUTO: 3 %
ERYTHROCYTE [DISTWIDTH] IN BLOOD BY AUTOMATED COUNT: 15.4 % (ref 10–15)
GLUCOSE BLDC GLUCOMTR-MCNC: 100 MG/DL (ref 70–99)
GLUCOSE BLDC GLUCOMTR-MCNC: 114 MG/DL (ref 70–99)
GLUCOSE BLDC GLUCOMTR-MCNC: 118 MG/DL (ref 70–99)
GLUCOSE BLDC GLUCOMTR-MCNC: 139 MG/DL (ref 70–99)
GLUCOSE BLDC GLUCOMTR-MCNC: 141 MG/DL (ref 70–99)
GLUCOSE BLDC GLUCOMTR-MCNC: 143 MG/DL (ref 70–99)
GLUCOSE BLDC GLUCOMTR-MCNC: 200 MG/DL (ref 70–99)
GLUCOSE BLDC GLUCOMTR-MCNC: 361 MG/DL (ref 70–99)
GLUCOSE BLDC GLUCOMTR-MCNC: 53 MG/DL (ref 70–99)
GLUCOSE BLDC GLUCOMTR-MCNC: 62 MG/DL (ref 70–99)
GLUCOSE BLDC GLUCOMTR-MCNC: 93 MG/DL (ref 70–99)
GLUCOSE SERPL-MCNC: 379 MG/DL (ref 70–99)
HCO3 SERPL-SCNC: 22 MMOL/L (ref 22–29)
HCT VFR BLD AUTO: 30.9 % (ref 35–47)
HGB BLD-MCNC: 9.6 G/DL (ref 11.7–15.7)
IMM GRANULOCYTES # BLD: 0 10E3/UL
IMM GRANULOCYTES NFR BLD: 1 %
LEVETIRACETAM SERPL-MCNC: 23.2 ÂΜG/ML (ref 10–40)
LYMPHOCYTES # BLD AUTO: 1.1 10E3/UL (ref 0.8–5.3)
LYMPHOCYTES NFR BLD AUTO: 18 %
MCH RBC QN AUTO: 26.7 PG (ref 26.5–33)
MCHC RBC AUTO-ENTMCNC: 31.1 G/DL (ref 31.5–36.5)
MCV RBC AUTO: 86 FL (ref 78–100)
MONOCYTES # BLD AUTO: 0.4 10E3/UL (ref 0–1.3)
MONOCYTES NFR BLD AUTO: 6 %
NEUTROPHILS # BLD AUTO: 4.4 10E3/UL (ref 1.6–8.3)
NEUTROPHILS NFR BLD AUTO: 73 %
NRBC # BLD AUTO: 0 10E3/UL
NRBC BLD AUTO-RTO: 0 /100
PLATELET # BLD AUTO: 183 10E3/UL (ref 150–450)
POTASSIUM SERPL-SCNC: 4.4 MMOL/L (ref 3.4–5.3)
PROT SERPL-MCNC: 5.2 G/DL (ref 6.4–8.3)
RBC # BLD AUTO: 3.59 10E6/UL (ref 3.8–5.2)
SODIUM SERPL-SCNC: 136 MMOL/L (ref 135–145)
WBC # BLD AUTO: 6 10E3/UL (ref 4–11)

## 2025-05-14 PROCEDURE — 99233 SBSQ HOSP IP/OBS HIGH 50: CPT | Mod: 24 | Performed by: NURSE PRACTITIONER

## 2025-05-14 PROCEDURE — 258N000001 HC RX 258: Performed by: HOSPITALIST

## 2025-05-14 PROCEDURE — 258N000001 HC RX 258

## 2025-05-14 PROCEDURE — 36415 COLL VENOUS BLD VENIPUNCTURE: CPT

## 2025-05-14 PROCEDURE — 97165 OT EVAL LOW COMPLEX 30 MIN: CPT | Mod: GO

## 2025-05-14 PROCEDURE — 85025 COMPLETE CBC W/AUTO DIFF WBC: CPT

## 2025-05-14 PROCEDURE — 97530 THERAPEUTIC ACTIVITIES: CPT | Mod: GO

## 2025-05-14 PROCEDURE — 250N000013 HC RX MED GY IP 250 OP 250 PS 637

## 2025-05-14 PROCEDURE — 80177 DRUG SCRN QUAN LEVETIRACETAM: CPT

## 2025-05-14 PROCEDURE — 250N000013 HC RX MED GY IP 250 OP 250 PS 637: Performed by: STUDENT IN AN ORGANIZED HEALTH CARE EDUCATION/TRAINING PROGRAM

## 2025-05-14 PROCEDURE — 250N000013 HC RX MED GY IP 250 OP 250 PS 637: Performed by: PHYSICIAN ASSISTANT

## 2025-05-14 PROCEDURE — 999N000248 HC STATISTIC IV INSERT WITH US BY RN

## 2025-05-14 PROCEDURE — 97530 THERAPEUTIC ACTIVITIES: CPT | Mod: GP

## 2025-05-14 PROCEDURE — 250N000011 HC RX IP 250 OP 636

## 2025-05-14 PROCEDURE — 80053 COMPREHEN METABOLIC PANEL: CPT

## 2025-05-14 PROCEDURE — 120N000002 HC R&B MED SURG/OB UMMC

## 2025-05-14 PROCEDURE — 250N000009 HC RX 250: Performed by: STUDENT IN AN ORGANIZED HEALTH CARE EDUCATION/TRAINING PROGRAM

## 2025-05-14 PROCEDURE — 250N000011 HC RX IP 250 OP 636: Mod: JZ

## 2025-05-14 PROCEDURE — 92526 ORAL FUNCTION THERAPY: CPT | Mod: GN | Performed by: SPEECH-LANGUAGE PATHOLOGIST

## 2025-05-14 PROCEDURE — 99207 PR APP CREDIT; MD BILLING SHARED VISIT: CPT

## 2025-05-14 PROCEDURE — 99233 SBSQ HOSP IP/OBS HIGH 50: CPT | Mod: FS | Performed by: STUDENT IN AN ORGANIZED HEALTH CARE EDUCATION/TRAINING PROGRAM

## 2025-05-14 RX ORDER — LEVETIRACETAM 100 MG/ML
500 SOLUTION ORAL 2 TIMES DAILY
Status: DISCONTINUED | OUTPATIENT
Start: 2025-05-14 | End: 2025-05-16

## 2025-05-14 RX ORDER — DEXTROSE MONOHYDRATE 100 MG/ML
INJECTION, SOLUTION INTRAVENOUS CONTINUOUS
Status: DISCONTINUED | OUTPATIENT
Start: 2025-05-14 | End: 2025-05-14

## 2025-05-14 RX ORDER — ASPIRIN 81 MG/1
324 TABLET, CHEWABLE ORAL DAILY
Status: DISCONTINUED | OUTPATIENT
Start: 2025-05-15 | End: 2025-05-15

## 2025-05-14 RX ADMIN — DEXTROSE MONOHYDRATE 25 ML: 25 INJECTION, SOLUTION INTRAVENOUS at 02:33

## 2025-05-14 RX ADMIN — DICLOFENAC SODIUM 2 G: 10 GEL TOPICAL at 08:52

## 2025-05-14 RX ADMIN — CARVEDILOL 6.25 MG: 3.12 TABLET, FILM COATED ORAL at 08:50

## 2025-05-14 RX ADMIN — CARVEDILOL 6.25 MG: 3.12 TABLET, FILM COATED ORAL at 18:04

## 2025-05-14 RX ADMIN — DEXTROSE: 10 SOLUTION INTRAVENOUS at 03:17

## 2025-05-14 RX ADMIN — GABAPENTIN 100 MG: 100 CAPSULE ORAL at 22:11

## 2025-05-14 RX ADMIN — DICLOFENAC SODIUM 2 G: 10 GEL TOPICAL at 16:23

## 2025-05-14 RX ADMIN — DICLOFENAC SODIUM 2 G: 10 GEL TOPICAL at 11:49

## 2025-05-14 RX ADMIN — ATORVASTATIN CALCIUM 40 MG: 40 TABLET, FILM COATED ORAL at 08:50

## 2025-05-14 RX ADMIN — LEVOTHYROXINE SODIUM 88 MCG: 88 TABLET ORAL at 08:51

## 2025-05-14 RX ADMIN — HEPARIN SODIUM 5000 UNITS: 5000 INJECTION, SOLUTION INTRAVENOUS; SUBCUTANEOUS at 13:39

## 2025-05-14 RX ADMIN — LEVETIRACETAM 500 MG: 100 SOLUTION ORAL at 20:33

## 2025-05-14 RX ADMIN — Medication 1000 MCG: at 08:51

## 2025-05-14 RX ADMIN — HEPARIN SODIUM 5000 UNITS: 5000 INJECTION, SOLUTION INTRAVENOUS; SUBCUTANEOUS at 22:11

## 2025-05-14 RX ADMIN — Medication 20 MG: at 08:52

## 2025-05-14 RX ADMIN — ASPIRIN 300 MG: 300 SUPPOSITORY RECTAL at 08:58

## 2025-05-14 RX ADMIN — Medication 5 MG: at 20:34

## 2025-05-14 RX ADMIN — LEVETIRACETAM 500 MG: 5 INJECTION INTRAVENOUS at 07:30

## 2025-05-14 ASSESSMENT — ACTIVITIES OF DAILY LIVING (ADL)
ADLS_ACUITY_SCORE: 77
ADLS_ACUITY_SCORE: 77
ADLS_ACUITY_SCORE: 78
ADLS_ACUITY_SCORE: 77
ADLS_ACUITY_SCORE: 78
ADLS_ACUITY_SCORE: 77
ADLS_ACUITY_SCORE: 81
ADLS_ACUITY_SCORE: 81
ADLS_ACUITY_SCORE: 77
ADLS_ACUITY_SCORE: 78
ADLS_ACUITY_SCORE: 77
ADLS_ACUITY_SCORE: 77

## 2025-05-14 NOTE — PLAN OF CARE
Shift Hours: 1900 - 0700    Assessment:  Body systems assessments were at patient's baseline.        Activity     Fall Risk Score: 75   Bed alarm on? Yes     Activity Assistance Provided: assistance, 1 person      Assistive Device Utilized: gait belt, walker    Pain: Generalized    Labs/RN Managed Protocols: xxx    Lines/Drains: NG, PIV R and primofit    Nutrition: NPO , TF at 40    Goal Outcome Evaluation  Plan of Care Reviewed With: patient  Overall Patient Progress: no change     Pt alert and oriented to self , Pt was sleeping most of the night but easy to arouse.Pt trying to grab her NG every time she's awake . 1:1 sitter at bedside. NPH given and reduced Insulin drip to 0.5 as ordered and stop after an hour. Bs taken every 4 hrs . NG tube feed at goal rate infusing well . Pt NPO by mouth .blood sugar drop to 62 after 2 hours from 154 gave apple juice through NG recheck done and was 53 gave d50 25ml.latest blood sugar 118. Provider notified .D10 at 50 started as ordered to call if not in goal rate. Pt daughter called via phone and if possible swallow study will be done in 1:30pm.     Barriers to Discharge:   xxx

## 2025-05-14 NOTE — PROGRESS NOTES
[facility]    Medicine Progress Note - Hospitalist Service, GOLD TEAM 6    Date of Admission:  5/7/2025    Assessment & Plan   Isabell Matthews is a 64 year old female with a past medical history of HTN, T1DM, HLD, diabetic neuropathy, orthostatic hypotension, seizures, vascular dementia, prior CVAs, hypothyroidism, recurrent UTIs, and frequent falls. She initially presented to the ED for evaluation of stroke-like symptoms and was admitted for further monitoring and management.     Multifocal Acute Cerebral Infarcts  Hx of Multiple CVAs  Hx of Seizures  Pt initially presented w/ new findings of dysarthria, dysphagia, decreased responsiveness, confusion. However, while in the ED, no new focal neurologic sx were observed. Head CT negative for acute infarct or hemorrhage. CTA w/ multifocal stenosis involving L RADHA (A2) and R MCA which had progressed since 1/16/25. Stroke Neuro team consulted while in the ED, and loaded with Keppra on arrival. EEG negative. ECHO showed LV 50-55%, mnild ventricular hypokinesis, RV normal size, no significant valvular abnormalities. Initially, she was found to have a UTI, so there was concern that perhaps her presentation was d/t recrudescence of prior CVA deficits, however, MRI brain did show acute infarcts in R corona radiata, precentral gyrus, and operculum. Unfortunately, she was not a candidate for advanced therapies. Aside from ongoing dysarthria, no new focal neurologic deficits. Clinically stable this morning, and though somnolent on exam, is slightly more awake than yesterday.  - Stroke Neuro team following, appreciate assistance  - Deescalate neuro checks to every shift given stability  - NPO per SLP for now  - Now that we have enteral access via NJT, will transition from [place] ASA to PO ASA 324mg daily (crushed via NJT)   - However, until can be formally evaluated by SLP w/ VFSS, will hold off on adding Plavix for DAPT (but, once added,  "will need for 90 days per Neuro)  - Atorvastatin 40mg daily  - Will transition IV Keppra --> PO Keppra via NJ tube (equivalent dosing)  - Monitor on tele for arrhythmia   - At discharge, will need ZioPatch x14 days mailed out  - Stroke Neuro follow-up in 8 weeks    Acute Metabolic Encephalopathy, likely multifactorial  Hx of Vascular Dementia  Acute change in status noted on arrival, possibly 2/2 acute CVA vs metabolic encephalopathy. Likely multifactorial with UTI, dehydration, hyperglycemia, hypercapnia, and hospital environment contributing, as well as newfound acute CVA as above. No significant electrolyte abnormalities. No suspicious meds. TSH 0.14. Today, she is quite somnolent on exam, but stirs to my voice and her name. Unlike yesterday, she opens her eyes for brief periods and, when asked to state her name, is able to \"mouth\" her name without producing verbal speech. When asked how many dogs she has, she holds up 2 fingers (per RN, this is correct).  - Monitor clinically  - Continue bedside sitter to ensure no pulling of NJT  - Delirium precautions   - Would recommend bed inclined, lights on, blinds open and TV on during the day and then at night having all these off and bed more supine to encourage proper sleep-wake cycle  - Will schedule melatonin 5mg at bedtime starting tonight      UTI, recurrent, resolved  Urine culture from 5/1/25 shows pan-susceptible E.Coli. On arrival here, aebrile, WBC normal. No other obvious source of infection. Repeat UA/UC negative. At this point, pt has completed adequate therapy for uncomplicated UTI. However, course was extended to 7 days d/t encephalopathy.   - Completed 7 days of IV Ceftriaxone on 5/12     Dysphagia  Patient failed initial swallow study. Likely in the setting of acute encephalopathy and acute stroke. SLP consulted, recommended strict NPO. NG placed 5/10, and has now begun TFs without issues.   - Per SLP evaluation on 5/12, recs to keep NPO for now    - " VFSS pending - attempted yesterday (5/13), but unable to complete d/t somnolence   - Resumed all enteral meds on 5/13 via NJT  - Now up to goal rate of 45mL/hr for TFs via NJT, appears to be tolerating well     Hypernatremia, resolved  Free water deficit in setting of dysphagia and NPO status. Resolved with hypotonic fluids.   - Trend lytes daily for now especially as initiating TFs, monitor for refeeding     Possible Stress Cardiomyopathy  ECHO in the ED showed LVEF 50-55% with mid ventricular hypokinesis w/ preserved apical and mid segements c/w stress CM. No obvious signs of heart failure. Clinically, she has no s/sx to suggest acutely decompensated HF.  - Continue to monitor     Type 1 Diabetes Mellitus  A1c of 8.8% in 3/2025. BG in range of 300-400 this admission. NPO due to dysphagia. Hx of labile sugars and hypoglycemia in setting of infection. Home regimen: Lantus 18 units at bedtime. Endocrine consulted to assist with insulin dosing in setting of tube feeds. Hypoglycemic overnight to 50s.   - Endocrine following, appreciate assistance   - Adjusted Lantus to 8 units at bedtime    - Decreased NPH to 15 units qAM   - NPH dosing at 2100 TBD pending on trends today (will reassess tomorrow 5/15)   - Medium sliding scale insulin Q4H for now while NPO   - BG checks Q4H while NPO   - Hypoglycemia protocol   - PRN D10 at 60mL/hr if TFs are stopped/interrupted     Hypothyroidism  Most recent TSH 0.99. Repeat TSH here 0.14. On admission, daughter noted recent medication adjustments (brand synthroid vs generic) therefore may have been over-replaced. Cannot r/o sick euthyroid state as well.  - Continue PTA PO synthroid 88mcg   - Recheck TSH in 1-2 weeks when clinically stable     Stage IIIb CKD  Baseline Cr of 1.4-1.6. Cr improved to 1.3 with IVF. Suspect chronic dehydration contributing.  - BMP daily for now  - Avoid nephrotoxic agents as best as possible     HTN  HLD  -180 on admission. Can allow for permissive  hypertension in setting of acute stroke per Stroke Neuro.  - Continue PTA Carvedilol w/ hold parameters  - Resumed PTA Atorvastatin w/ enteral access via NJ  - Transitioned [place] ASA to PO ASA 324mg daily as above  - IV hydralazine PRN for SBP>180     Chronic Low Back Pain  - Diclofenac gel PRN  - Continue PTA Duloxetine, Gabapentin as now have enteral access     Seasonal allergies  - Hold PTA loratidine for now to reduce pill burden via NJT            Diet: NPO for Medical/Clinical Reasons Except for: No Exceptions  Adult Formula Drip Feeding: Continuous Osmolite 1.5; Nasogastric tube; Goal Rate: 40; mL/hr; Initiate at 10ml/hr and advance by 10ml Q8H as tolerated.; Do not advance tube feeding rate unless K+ is = or > 3.0, Mg++ is = or > 1.5, and Phos is = or ...    DVT Prophylaxis: Pneumatic Compression Devices  Parker Catheter: Not present  Lines: None     Cardiac Monitoring: None  Code Status: No CPR- Do NOT Intubate      Clinically Significant Risk Factors               # Hypoalbuminemia: Lowest albumin = 3 g/dL at 5/14/2025  8:29 AM, will monitor as appropriate     # Hypertension: Noted on problem list     # Dementia: noted on problem list            # Financial/Environmental Concerns:           Social Drivers of Health    Food Insecurity: Unknown (3/25/2025)    Food Insecurity     Within the past 12 months, did you worry that your food would run out before you got money to buy more?: Patient unable to answer     Within the past 12 months, did the food you bought just not last and you didn t have money to get more?: Patient unable to answer   Depression: At risk (4/2/2025)    PHQ-2     PHQ-2 Score: 4   Housing Stability: Unknown (3/25/2025)    Housing Stability     Do you have housing? : Patient unable to answer     Are you worried about losing your housing?: Patient unable to answer   Tobacco Use: Medium Risk (4/24/2025)    Patient History     Smoking Tobacco Use: Former     Smokeless Tobacco Use: Never    Financial Resource Strain: Unknown (3/25/2025)    Financial Resource Strain     Within the past 12 months, have you or your family members you live with been unable to get utilities (heat, electricity) when it was really needed?: Patient unable to answer   Transportation Needs: Unknown (3/25/2025)    Transportation Needs     Within the past 12 months, has lack of transportation kept you from medical appointments, getting your medicines, non-medical meetings or appointments, work, or from getting things that you need?: Patient unable to answer   Interpersonal Safety: Unknown (3/25/2025)    Interpersonal Safety     Do you feel physically and emotionally safe where you currently live?: Patient unable to answer     Within the past 12 months, have you been hit, slapped, kicked or otherwise physically hurt by someone?: Patient unable to answer     Within the past 12 months, have you been humiliated or emotionally abused in other ways by your partner or ex-partner?: Patient unable to answer          Disposition Plan     Medically Ready for Discharge: Anticipated in 5+ Days           The patient's care was discussed with the Attending Physician, Dr. Lubna Roman, Bedside Nurse, Patient, and Endocrinology Consultant(s).    Jimmy Moore PA-C  Hospitalist Service, GOLD TEAM [address] [place] [address]  Securely message with [employer] (more info)  Text page via [facility] Paging/Directory   See signed in provider for up to date coverage information  ______________________________________________________________________    Interval History   Pt seen in her room this AM. Stirs to loud voice, and open eyes today briefly. Bedside attendant reports no acute concerns this AM, and not witnessing any pulling of her lines today. No observed vomiting, coughing, or complaints of nausea with tube feeds.    Bedside attendant reports that the patient was awake until about 4am last night and then fell asleep shortly  thereafter.    Physical Exam   Vital Signs: Temp: 97.9  F (36.6  C) Temp src: Axillary BP: (!) 143/70 Pulse: 87   Resp: 20 SpO2: 98 % O2 Device: None (Room air)    Weight: 121 lbs 11.2 oz    Constitutional: somnolent and resting in bed, cooperativeness not assessed as sleeping on exam, no apparent distress, and appears stated age. Able to open eyes briefly today (more than yesterday).  Eyes: lids and lashes normal  ENT: normocephalic, without obvious abnormality. NJ present at nare, no observed bleeding.  Respiratory: No increased work of breathing, good air exchange, clear to auscultation bilaterally, no crackles or wheezing  Cardiovascular: regular rate and rhythm, normal S1 and S2, no S3, no S4, and no murmur noted  GI: normal bowel sounds, soft, non-distended, and non-tender  Skin: no bruising or bleeding, no redness, warmth, or swelling, no rashes, and no lesions on visualized skin.   Musculoskeletal: no lower extremity pitting edema present, no deformities, no pain response to calf palpation.  Neurologic: Moving all extremities equally and spontaneously when stirring to voice. Able to confirm how many dogs she owns by holding up two fingers (per RN this is correct).  Neuropsychiatric: General: calm  Level of consciousness: lethargic and sleeping, but briefly rouses to her name.     Medical Decision Making       50 MINUTES SPENT BY ME on the date of service doing chart review, history, exam, documentation & further activities per the note.      Data     I have personally reviewed the following data over the past 24 hrs:    6.0  \   9.6 (L)   / 183     136 104 21.9 /  200 (H)   4.4 22 1.44 (H) \     ALT: 15 AST: 25 AP: 233 (H) TBILI: <0.2   ALB: 3.0 (L) TOT PROTEIN: 5.2 (L) LIPASE: N/A       Imaging results reviewed over the past 24 hrs:   Recent Results (from the past 24 hours)   XR Video Swallow with SLP or OT    Narrative    Examination:  Modified Barium Swallow Study with Speech  Pathology,  5/13/2025    Comparison: None.    History:   dysphagia in setting of stroke    Fluoro time: 0.1 minutes.    Findings: Attempted modified barium swallow study was unsuccessful as  patient was unarousable.      Impression    Impression: Attempted modified barium swallow study was unsuccessful  as patient was unarousable.        I have personally reviewed the examination and initial interpretation  and I agree with the findings.    SANDY BRYANT MD         SYSTEM ID:  [id number]     Recent Labs   Lab 05/14/25  1143 05/14/25  0829 05/14/25  0758 05/13/25  0750 05/13/25  0631 05/12/25  0749 05/12/25  0554 05/11/25  0752 05/11/25  0701   WBC  --  6.0  --   --  5.9  --  6.3  --  7.2   HGB  --  9.6*  --   --  9.8*  --  11.2*  --  11.2*   MCV  --  86  --   --  84  --  86  --  87   PLT  --  183  --   --  192  --  147*  --  220   NA  --  136  --   --  138  --  140  --  140   POTASSIUM  --  4.4  --   --  4.1  --  3.8  --  3.6   CHLORIDE  --  104  --   --  106  --  109*  --  109*   CO2  --  22  --   --  21*  --  16*  --  22   BUN  --  21.9  --   --  18.5  --  14.4  --  11.8   CR  --  1.44*  --   --  1.43*  --  1.30*  --  1.21*   ANIONGAP  --  10  --   --  11  --  15  --  9   VERONICA  --  8.2*  --   --  8.5*  --  8.9  --  8.8   * 379* 361*   < > 437*   < > 144*   < > 161*   ALBUMIN  --  3.0*  --   --   --   --   --   --  3.3*   PROTTOTAL  --  5.2*  --   --   --   --   --   --  6.0*   BILITOTAL  --  <0.2  --   --   --   --   --   --  0.3   ALKPHOS  --  233*  --   --   --   --   --   --  209*   ALT  --  15  --   --   --   --   --   --  16   AST  --  25  --   --   --   --   --   --  26    < > = values in this interval not displayed.

## 2025-05-14 NOTE — PROGRESS NOTES
Inpatient Diabetes Management Service: Daily Progress Note     HPI: Isabell Matthews is a 64 year old female with history of HTN, HLD, DM type I, diabetic neuropathy, CKD3b, orthostatic hypotension, seizures, vascular dementia, hypothyroidism, recurrent UTI's, and freuqent falls who was admitted to [facility] with concern for stroke after presenting with acute neurologic symptoms, MRI with acute infarcts within R corona radiata, precentral gyrus and operculum, though presenting symptoms do not completely explain focal deficits and not a candidate for advanced therapies. Hospital course complicated by dysphagia and now NPO per SLP with plan to start tube feedings.     IDS consulted to assist with glycemic management as well as optimization while inpatient and when applicable, recommendations for transition home.         Assessment/Plan:     Assessment:  Type 1 Diabetes Mellitus c/b peripheral neuropathy, nephropathy, hx of DKA and hypoglycemia. Sub-optimal control. (A1c 8.8 % 3/23/25, Hgb: 10.5)  Vascular dementia with acute neurologic changes and new dysphagia  Acute hyperglycemia 2/2 Tube feedings  Labile BG   BMI: 22    Plan/Recommendations:    - Adjust to Lantus 8 units q 24 hrs at 2200   - Decrease to NPH 15 unit(s) at 0900   - NPH dose at 2100 TBD pending trends today. Addm: NPH 15 unit(s) at 2100   - Novolog Correction Scale: medium resistance (ISF: 25)  every 4 hours   - BG checks q4h    - PRN D10 at 60 ml/hr if tube feeds are stopped/interrupted (dosed for Osmolite 1.5 (or equivalent) at 40 ml/hr) dosing provides 75% of CHO that would have been given by TF   - Hypoglycemia protocol    - Carb counting protocol       Per ADA guidelines, treatment goals and recommendations for older adults with DM and medically complexity is less stringent BG control/A1c for safety - targets of 110-180 mg/dL and up to 250 mg/dL reasonable - albeit not persistent for those with T1DM. Avoid reliance on A1c. Glucose  decisions should be based on avoidance of hypoglycemia and symptomatic hyperglycemia.     Discussion:     Labile BG, BG dip at 0200 to 62 and 53 which then corrected following juice and 25 g dextrose - is on D10 at 50 ml/hr which provides 5 g cho per hour. Dig have an aggressive rebound, could be related to D10 vs somogyi?     0530 vocera to RN shares/confirms the TF was briefly off during a move but it was not during that time.   Yesterday, on 12 unit(s) - BG in the 400's  Pattern in not consistent, and challenging to decipher.   Labs reassuring at this time  Creat 1.44/GFR 40    Patient is minimally communicative but is answering some yes/no questions and nodding. TF remains at goal.     Will adjust insulin today accordingly - updated primary team.     Per primary team Dispo: SLP rec TCU, PT rec home w assist vs TCU (no way she could return home in current state), OT yet to evaluate. Anticipate dispo to be TCU, >3-5 days when off sitter, mental status improved, nutrition plan determined     5:01 PM following trends, will dose with 15 unit(s) NPH this evening. BG is 141 at 1622.          Discharge Planning: (tentative)  Medications: TBD  - adjustment of PTA dosing with addition of NPH to cover tube feeds as indicated   Needs set doses for day program, would be able to use ICR at home.   Test Claims:  none needed.   Education:  Needs to be assessed closer to discharge.    Outpatient Follow-up: PCP- Dr Kellogg ( LOV 4/24/25), until able to establish with [facility]- has appointment with Mai Figueroa 8/4/25    Please notify Inpatient Diabetes Service if changes are planned to steroids, nutrition, TPN/TF and anticipated procedures requiring prolonged NPO status.         Interval History/Review of Systems :   The last 24 hours progress and nursing notes reviewed.    - resting in bed, sitter for 1:1    Inpatient Glucose Control:             Planned Procedures/Surgeries: none    Recent Labs   Lab  05/14/25  0407 05/14/25  0314 05/14/25  0250 05/14/25  0226 05/14/25  0203 05/13/25  2147   * 114* 118* 53* 62* 154*           Medications Impacting Glycemia:   Steroids: none  D5W containing solutions/medications: none   Other medications impacting glucose: none        Nutrition:   Orders Placed This Encounter      NPO for Medical/Clinical Reasons Except for: No Exceptions    Supplements:  none  TF:   5/10: will start Osmolite 1.5 (or equivalent) at 10 ml.hr to advance by 10ml every 8 hours (goal 40 ml/hr provides Osmolite 1.5 (or equivalent)  Goal: 40 ml/hr provides 8.16 g per hour or 195 CHO in 24 hours)   TPN: none         Diabetes History: see full consult note for complete diabetes history   Diabetes Type and Duration: DM for > 40 years, diagnosed in her 40s. Per daughter,  initially told she has T2DM then T1DM for a while then flipped back to being told T2DM.      6/17/2024 positive GAD65 antibody and c-peptide <0.1 but BG elevated     PTA Medication Regimen:   From discharge 3/30/25:- daughter not present to confirm   - Lantus 18 units once daily at HS  - Lispro ICR 1:15 TID AC, 1:20 PRN with snacks/supplements  - Lispro correction 1:50>150 TID AC, >200 HS     At discharge had discussed challenges around glucose lability and dosing: Lantus dose of 18 units may be too high if patient is not eating. However, daughter notes that with more dramatic changes patient will sometimes rise to 400s and has been in DKA before which is hard to balance. If she is concerned about mom going low at night, will try to give her a snack to support Lantus dosing. She is working to resume CGM to more closely monitor blood sugars.  Missing doses?: unknown, daughter not present to confirm   Historical Diabetes Medications: various insulins and dosing      Glucose monitoring device/frequency/trends: Historically has used glucometer before meals and at bedtime. Has tried and failed multiple CGMs (issues with accuracy or  patient picking CGM off).   Current trends unknown as daughter not present to discuss- historically known to be labile with needs dropping dramatically when NPO    Hypoglycemia PTA:   - Frequency: currently unknown, but historically labile.   - Severity: + history of severe unconscious lows   - Awareness: variable, not intact    - Treatment: candy, juice       Outpatient Diabetes Provider: Current PCP: Dr Kellogg ( LOV 4/24/25) ; seen by Dr Bony Castaneda in the past (LOV 10/7/24)   Endo referral placed by PCP and IDS last admission, Scheduled with Mai Figueroa PAC 8/4/25   Formal Diabetes Education/Educator: none recently        Physical Exam:   BP (!) 151/55 (BP Location: Left arm, Cuff Size: Adult Regular)   Pulse 105   Temp 98.2  F (36.8  C) (Axillary)   Resp 18   Wt 55.2 kg (121 lb 11.2 oz)   SpO2 95%   BMI 21.56 kg/m    General Appearance: Confused, lethargic, but calm - minimally interactive   HEENT: Normocephalic, atraumatic.   Lungs:  Breathing comfortably   Abdomen: Round   Extremities:  No significant  lower extremity edema.   Skin: No obvious rashes, lesions or bruising.   Neuro: disoriented x4 at baseline    Psych:  Calm       Data:     Lab Results   Component Value Date    A1C 8.8 (H) 03/23/2025    A1C 9.6 (H) 01/16/2025    A1C 11.2 (H) 09/23/2024    A1C 10.2 (H) 05/20/2024    A1C 10.7 (H) 01/29/2024    A1C 7.8 (H) 06/21/2021    A1C 11.7 (H) 09/27/2020     ROUTINE IP LABS (Last four results)  BMP  Recent Labs   Lab 05/14/25  0407 05/14/25  0314 05/14/25  0250 05/14/25  0226 05/13/25  0750 05/13/25  0631 05/12/25  0749 05/12/25  0554 05/11/25  0752 05/11/25  0701 05/10/25  1959 05/10/25  1735 05/10/25  1221 05/10/25  0817   NA  --   --   --   --   --  138  --  140  --  140  --   --   --  142   POTASSIUM  --   --   --   --   --  4.1  --  3.8  --  3.6  --  3.8  --  3.6   CHLORIDE  --   --   --   --   --  106  --  109*  --  109*  --   --   --  112*   VERONICA  --   --   --   --   --  8.5*  --  8.9  --   8.8  --   --   --  8.9   CO2  --   --   --   --   --  21*  --  16*  --  22  --   --   --  20*   BUN  --   --   --   --   --  18.5  --  14.4  --  11.8  --   --   --  19.7   CR  --   --   --   --   --  1.43*  --  1.30*  --  1.21*  --   --   --  1.38*   * 114* 118* 53*   < > 437*   < > 144*   < > 161*   < >  --    < > 152*  166*    < > = values in this interval not displayed.     CBC  Recent Labs   Lab 05/13/25  0631 05/12/25  0554 05/11/25  0701 05/10/25  0817   WBC 5.9 6.3 7.2 8.1   RBC 3.65* 4.16 4.14 4.06   HGB 9.8* 11.2* 11.2* 10.9*   HCT 30.6* 35.9 35.9 35.6   MCV 84 86 87 88   MCH 26.8 26.9 27.1 26.8   MCHC 32.0 31.2* 31.2* 30.6*   RDW 15.4* 15.2* 15.0 15.4*    147* 220 242     INR  Recent Labs   Lab 05/07/25  1329   INR 1.00     Inpatient Diabetes Service will continue to follow, please don't hesitate to contact the team with any questions or concerns.     Aster Brownlee, APRN-CNP, [facility]   Inpatient Diabetes Service  Available on [facility] when on duty.     To contact Inpatient Diabetes Service:     7 AM - 5 PM: Page the [employer] following the patient that day (see filed or incomplete progress notes/consult notes under Endocrinology)    OR if uncertain of provider assignment: page job code [phone]    5 PM - 7 AM: First call after hours is to primary service.    For urgent after-hours questions, page job code for on call fellow: [phone]     I spent a total of 50 minutes on the date of the encounter doing prep/post-work, chart review, history and exam, documentation and further activities per the note including lab review, multidisciplinary communication, counseling the patient and/or coordinating care regarding acute hyper/hypoglycemic management, as well as discharge management and planning/communication.  See note for details.      Please notify inpatient diabetes service if changes are planned to steroids, nutrition, or if procedures are planned requiring prolonged NPO status.Diabetes Management  Team job code: [phone]

## 2025-05-14 NOTE — PROGRESS NOTES
[facility]    Vascular Neurology Progress Note    Interval History     Stroke neurology team had been previously following this patient and had signed off following stroke workup.  Primary team reached out due to ongoing somnolence and family had expressed concern regarding Keppra.    Per report, the patient has had fluctuating somnolence throughout her hospital stay but particularly in the last few days.    Lisa did not have much spontaneous speech but would answer questions when asked, she responded no when asked if she had any headache, dizziness, changes in sensation, and weakness.    Hospital Course     Chief complaint: Stroke Symptoms    Isabell Matthews is a 64 year old female with a history of hypertension, hyperlipidemia, CKD IIIb, type 1 diabetes complicated by neuropathy, history of prior ischemic and hemorrhagic stroke (right basal ganglia ischemic infarct 2/2 ICAD 2018, left basal ganglia hemorrhagic stroke 2021, right centrum semiovale and right chuck 2023), with residual facial droop  (had right sided and left sided facial droop in past) and vascular dementia, seizure disorder, and recurrent UTI who had presented on 5/7 with acute onset speech changes and worsening of baseline cognition.  A stroke code was called on her presentation, she did not receive IV thrombolysis in the setting of history of ICH, minor and intermittent symptoms.  MRI was performed which showed multiple small acute infarcts in the right frontal lobe near chronic encephalomalacia from prior stroke, noted that this may have attributed to slight worsening of her baseline symptoms.  Stroke workup was performed and notably the patient has had ongoing dysphagia.  Stroke was suspected to be in the setting of ICAD and recommendation was to be on DAPT for 90 days once able to take p.o. medications.  Patient has not yet been able to take p.o. so has been receiving aspirin alone as there is  ongoing determination if the patient will need a PEG tube in her holding off on initiation of Plavix until that decision is made.  The patient also had an EEG performed when she initially presented which does not show evidence of seizures.  She is continued on her PTA dose of Keppra 500 mg twice daily.    Assessment and Plan     # Acute infarcts within right corona radiata, precentral gyrus, and operculum   # Acute encephalopathy, likely multifactorial (dementia, active UTI, many vascular risk factors, prior strokes, seizure history)  #History of seizures  # Dysphagia  Neurology team was reinvolved due to concern for ongoing and somnolence.  On exam, the patient was alert, did not have much spontaneous speech but was able to name, repeat, and follow simple commands, no drift in all extremities, had a left-sided facial droop, which the patient has had reported in the past, she did have some dysarthria which has been noted in the past and no other new focal neurologic deficits.  Her exam seems similar to prior.  And current exam, is less likely that the patient is in nonconvulsive status as he is able to follow commands briskly and no clear new focal neurologic deficit.  In the absence of no new focal neurologic deficit, do not feel that repeat imaging is needed at this time.  Keppra level was therapeutic, given that the patient has been on this medication for a prolonged period, feel this is less likely to be contributing to her change of mental status. Based on her history, it is possible that she would have fluctuations in her mental status.     - continue neurochecks qshift  - delirium precautions  - keppra level therapeutic  - continue Keppra 500mg BID  - continue ASA 324mg daily  - start Plavix with 300mg load then 75mg daily if PO or enteral access is secured with plan for DAPT for 90 days then ASA alone  - Cardiac monitor while hospitalized, Zio patch x14 days at discharge     No further stroke evaluation is  recommended, so we will sign off. Please contact us with any additional questions.    Valentine Alfaro MD  Neurology Resident, PGY-2    Note: Chart documentation done in part with Dragon Voice Recognition software. Although reviewed after completion, some word and grammatical errors may remain.      Physical Examination     Temp: 99  F (37.2  C) Temp src: Axillary BP: 114/63 Pulse: 89   Resp: 20 SpO2: 96 % O2 Device: None (Room air)      General Exam  General: sitting in chair in no acute distress  HEENT:  normocephalic/atraumatic  Cardio:  RRR  Pulmonary:  no respiratory distress  Abdomen:  non-distended  Extremities:  no edema  Skin:  intact     Neuro Exam  Mental Status: Awake, alert, intermittently attentive to conversation, did not know the month that she was in the hospital, but was able to name objects and follow simple commands and repeat, speech is dysarthric   Cranial Nerves:  PERRL, EOMI, EXTR throughout bilaterally, facial sensation intact to light touch, left-sided facial droop noted  (on chart review, patient has had prior right sided facial droop and prior left sided facial droop), H is dysarthric, shoulder strong strong   Motor: No abnormal movements noted, moving all extremities independently, able to maintain antigravity without drift in all extremities  Reflexes: No ankle clonus bilaterally  Sensory:  light touch sensation intact and symmetric throughout upper and lower extremities, no extinction on double simultaneous stimulation   Coordination:  normal finger-to-nose and heel-to-shin bilaterally without dysmetria  Station/Gait:  deferred    Stroke Scales       NIHSS  1a. Level of Consciousness 0-->Alert, keenly responsive   1b. LOC Questions 2-->Answers neither question correctly   1c. LOC Commands 0-->Performs both tasks correctly   2.   Best Gaze 0-->Normal   3.   Visual 0-->No visual loss   4.   Facial Palsy 1-->Minor paralysis (flattened nasolabial fold, asymmetry on smiling)   5a. Motor Arm,  Left 0-->No drift, limb holds 90 (or 45) degrees for full 10 secs   5b. Motor Arm, Right 0-->No drift, limb holds 90 (or 45) degrees for full 10 secs   6a. Motor Leg, Left 0-->No drift, leg holds 30 degree position for full 5 secs   6b. Motor Leg, right 0-->No drift, leg holds 30 degree position for full 5 secs   7.   Limb Ataxia 0-->Absent   8.   Sensory 0-->Normal, no sensory loss   9.   Best Language 1-->Mild-to-moderate aphasia, some obvious loss of fluency or facility of comprehension, without significant limitation on ideas expressed or form of expression. Reduction of speech and/or comprehension, however, makes conversation. . . (see row details)   10. Dysarthria 1-->Mild-to-moderate dysarthria, patient slurs at least some words and, at worst, can be understood with some difficulty   11. Extinction and Inattention  0-->No abnormality   Total 5 (05/15/25)       Imaging/Labs       MRI/Head CT CT head:   Impression:   1. No acute intracranial hemorrhage.  2. ASPECT Score: 10/10.  3. Chronic encephalomalacia of the right frontotemporal  periventricular region likely related to prior infarct. Chronic small  vessel ischemic disease and generalized cerebral volume loss.     CTP:  No evidence acute infarction on the CT Perfusion examination. Poor  perfusion in the right frontotemporal region compatible chronic  encephalomalacia in the region.     MRI brain:  IMPRESSION:  1. Acute infarcts within the right corona radiata, precentral gyrus,  and operculum.  2. Chronic infarcts in the right basal ganglia and left insula   Intracranial Vasculature Head CTA demonstrates no definite aneurysm is identified.  Progressed short segment stenosis/occlusion of the left A2 anterior  cerebral artery. Similar occlusion of the anterior division of the  right middle cerebral artery. Multifocal stenosis/beaded appearance of  the right middle cerebral artery M2 and M3 segments, which appears  slightly progressed from 1/16/2025 and may  be related to  atherosclerotic disease.    Cervical Vasculature Neck CTA demonstrates no hemodynamically significant stenosis of  the major cervical arteries. Less than 50%  stenosis of the right  carotid bulb and approximately 50% of calcific stenosis of the left  carotid bulb, unchanged from prior CT 1/16/2025.      Echocardiogram Pending   EKG/Telemetry Sinus tachycardia   Nonspecific ST abnormality   Abnormal ECG       LDL  5/8/2025: 60 mg/dL   A1C  3/23/2025: 8.8 %   Troponin 5/7/2025: 52 ng/L     Keppra 23.2

## 2025-05-14 NOTE — PLAN OF CARE
Goal Outcome Evaluation:      Plan of Care Reviewed With: patient, child    Overall Patient Progress: improvingOverall Patient Progress: improving    Outcome Evaluation: Pt slept off and on today. Responds to voice, has difficulty finding words. Difficulty swallowing her own saliva and may not speak if mouth is full of saliva, brief oral suctioning helps. Pt is strict NPO. TF running at goal of 40 mL/hr, meds crushed and given through tube. Pt is incontinent of bowel and bladder. Prima-fit in place. Pt had 3 loose BM's today. Hanh area is excoriated, using kait spray, mary kate dry wipes and barrier paste. Bruising on low back. Pt reports some discomfort on low back and L shoulder. BG's q 4 hrs. Keppra level ordered for 7 pm, do not give keppra dose until lab drawn.

## 2025-05-14 NOTE — PHARMACY-CONSULT NOTE
Pharmacy was consulted to review medication list for sedating medications.    The following are likely contributors:  - Duloxetine (9-11% dose related) has been on this dose PTA, no dose adj necessary when renal function >30ml/min  - gabapentin (19-21%); dose appropriate per renal function  - keppra (8-15%)  - loratadine - held since admission  - melatonin    Lucy Quiles.D, BCPS

## 2025-05-14 NOTE — PROGRESS NOTES
"   05/14/25 1101   Appointment Info   Signing Clinician's Name / Credentials (OT) Juju Mayes, OTR/L   Rehab Comments (OT) L UE NWB, wrist brace   Living Environment   People in Home child(tiffany), adult   Current Living Arrangements apartment   Home Accessibility no concerns   Transportation Anticipated family or friend will provide   Living Environment Comments Pt responding to questions minimally this date. Per OT eval on 3/26: \" lives with daughter who is one of their PCAs\"   Self-Care   Usual Activity Tolerance fair   Current Activity Tolerance poor   Equipment Currently Used at Home walker, standard;wheelchair, manual;shower chair   Fall history within last six months yes   Number of times patient has fallen within last six months 15  (10-15 as of 3/26 per last admission)   Activity/Exercise/Self-Care Comment Per previous admission OT eval on 3/26: \"Ambulates with FWW in their apt, manual wc for longer distances. Has 24/7 A from daughter and another PCA. Has HH PT/OT/RN. Daughter reports increased fall frequency lately and pt has been needing increased assist with ADLs from baseline. Has walk in shower with no lip and uses shower chair. Daughter assists with most ADLs including dressing, bathing, grooming, and toileting. Typically requires cueing and Min-Mod assist with ADLs. Total A for showering tasks.\"   General Information   Onset of Illness/Injury or Date of Surgery 05/07/25   Referring Physician Javier Johnson PA-C   Additional Occupational Profile Info/Pertinent History of Current Problem per chart \"64 year old woman with historyof HTN, HLD, CKD IIIb, T1DM c/b neuropathy, ischemic and hemorrhagic strokes (right basal ganglia ischemic infarct in 2018, ICAD left basal ganglia hemorrhagic stroke 2021, right centrum semiovale & right chuck 2023) with residual R facial droop and vascular dementia, seizures 2/2 DKA, and recurrent UTI who is currently admitted with encephalopathy, FTT, and slight " "worsening of baseline stroke symptoms. She was found to have multiple small acute infarcts involving the right frontal lobe near her chronic encephalomalacia from prior stroke, which may partially explain her slight worsening of baseline symptoms.\"   Existing Precautions/Restrictions fall;weight bearing   Left Upper Extremity (Weight-bearing Status) non weight-bearing (NWB)  (on 3/26 was admitted with wrist fx)   Right Upper Extremity (Weight-bearing Status) full weight-bearing (FWB)   Left Lower Extremity (Weight-bearing Status) full weight-bearing (FWB)   Right Lower Extremity (Weight-bearing Status) full weight-bearing (FWB)   General Observations and Info activity: up with assist   Cognitive Status Examination   Orientation Status person;place   Affect/Mental Status (Cognitive) low arousal/lethargic   Follows Commands follows one-step commands;75-90% accuracy   Cognitive Status Comments Pt with PMHx of dementia. Following simple commands with ~75% accuracy this date.   Visual Perception   Visual Impairment/Limitations WFL   Impact of Vision Impairment on Function (Vision) Appears WFL; will continue to monitor   Posture   Posture forward head position;protracted shoulders   Range of Motion Comprehensive   General Range of Motion bilateral upper extremity ROM WFL   Strength Comprehensive (MMT)   Comment, General Manual Muscle Testing (MMT) Assessment L LE appears weaker than right; generalzied weakness   Coordination   Coordination Comments not formally assessed this date   Bed Mobility   Bed Mobility supine-sit   Supine-Sit [place] (Bed Mobility) moderate assist (50% patient effort);2 person assist   Transfers   Transfers sit-stand transfer;toilet transfer;shower transfer   Sit-Stand Transfer   Sit-Stand [place] (Transfers) minimum assist (75% patient effort);2 person assist   Assistive Device (Sit-Stand Transfers) walker, front-wheeled   Sit/Stand Transfer Comments verbal and tactile cues to avoid " using L UE   Shower Transfer   [place] (Shower Transfer) moderate assist (50% patient effort);2 person assist   Shower Transfer Comments per clinical judgment   Toilet Transfer   [place] (Toilet Transfer) moderate assist (50% patient effort);2 person assist   Toilet Transfer Comments per clinical judgment   Activities of Daily Living   BADL Assessment/Intervention bathing;lower body dressing;grooming;toileting;upper body dressing   Bathing Assessment/Intervention   [place] (Bathing) dependent (less than 25% patient effort)   Assistive Devices (Bathing) shower chair   Comment, (Bathing) per clinical judgment and chart review   Upper Body Dressing Assessment/Training   [place] (Upper Body Dressing) maximum assist (25% patient effort)   Comment, (Upper Body Dressing) per clinical judgment   Lower Body Dressing Assessment/Training   [place] (Lower Body Dressing) dependent (less than 25% patient effort)   Comment, (Lower Body Dressing) per clinical judgment   Grooming Assessment/Training   [place] (Grooming) maximum assist (25% patient effort)   Comment, (Grooming) per clinical judgment   Toileting   [place] (Toileting) dependent (less than 25% patient effort)   Comment, (Toileting) per clinical judgment   Clinical Impression   Criteria for Skilled Therapeutic Interventions Met (OT) Yes, treatment indicated   OT Diagnosis decreased I/ADL IND   OT Problem List-Impairments impacting ADL problems related to;activity tolerance impaired;cognition;balance;mobility;inability to direct their own care;range of motion (ROM);strength;postural control;pain;vision   Assessment of Occupational Performance 5 or more Performance Deficits   Identified Performance Deficits bathing, toileting, funct mob, dressing, g/h,   Planned Therapy Interventions (OT) ADL retraining;strengthening;transfer training;home program guidelines;risk factor education;progressive  activity/exercise;cognition;neuromuscular re-education;balance training   Clinical Decision Making Complexity (OT) problem focused assessment/low complexity   Risk & Benefits of therapy have been explained evaluation/treatment results reviewed;care plan/treatment goals reviewed;risks/benefits reviewed;current/potential barriers reviewed;participants voiced agreement with care plan;participants included;patient   Clinical Impression Comments Pt would benefit from skilled OT services to promote ADL IND and return to PLOF   OT Total Evaluation Time   OT Eval, Low Complexity Minutes (80977) 5   OT Goals   Therapy Frequency (OT) 6 times/week   OT Predicted Duration/Target Date for Goal Attainment 06/25/25   OT Goals Hygiene/Grooming;Upper Body Dressing;Transfers;Toilet Transfer/Toileting;Home Management   OT: Hygiene/Grooming minimal assist   OT: Upper Body Dressing Minimal assist   OT: Transfer Moderate assist  (shower transfer)   OT: Toilet Transfer/Toileting Supervision/stand-by assist   OT: Home Management ambulatory level;Modified independent   OT Discharge Planning   OT Plan functional transfers as able, SPT, g/h at EOB, dressing   OT Discharge Recommendation (DC Rec) home with assist;home with home care occupational therapy;Transitional Care Facility   OT Rationale for DC Rec Unclear how close pt is to functional baseline, daughter not present during eval. Pending baseline and assistance at home, pt may be safe to d/c home; otherwise may benefit from TCU stay to promote increase ADL IND and safety prior to discharge home.   OT Brief overview of current status mod Ax2, FWW   OT Total Distance Amb During Session (feet) 3   Total Session Time   Timed Code Treatment Minutes 25   Total Session Time (sum of timed and untimed services) 30

## 2025-05-14 NOTE — PLAN OF CARE
"Goal Outcome Evaluation:      Plan of Care Reviewed With: patient    Overall Patient Progress: no changeOverall Patient Progress: no change    Outcome Evaluation: Pt slept most of the day despite attempts to wake her and keep her awake. Pt said less than 20 words today, but could give her last name, answer \"hospital\" to where she was and state the month. Pt had 1 large incontinent loose BM. Incontinent of urine but extenal catheter effective. TF running at goal of 40 mL without issue. Insulin gtt in algo 1 to be reduced at 2000 and stopped at 2100. IV Keppra delayed due compatibility issues, okay'd by pharm D. Pt was unable to complete video swallow study d/t lethargy.          "

## 2025-05-15 ENCOUNTER — APPOINTMENT (OUTPATIENT)
Dept: PHYSICAL THERAPY | Facility: CLINIC | Age: 65
End: 2025-05-15
Payer: COMMERCIAL

## 2025-05-15 ENCOUNTER — APPOINTMENT (OUTPATIENT)
Dept: SPEECH THERAPY | Facility: CLINIC | Age: 65
DRG: 064 | End: 2025-05-15
Payer: COMMERCIAL

## 2025-05-15 ENCOUNTER — APPOINTMENT (OUTPATIENT)
Dept: OCCUPATIONAL THERAPY | Facility: CLINIC | Age: 65
DRG: 064 | End: 2025-05-15
Payer: COMMERCIAL

## 2025-05-15 VITALS
RESPIRATION RATE: 18 BRPM | OXYGEN SATURATION: 97 % | SYSTOLIC BLOOD PRESSURE: 125 MMHG | BODY MASS INDEX: 23.67 KG/M2 | DIASTOLIC BLOOD PRESSURE: 64 MMHG | TEMPERATURE: 97.7 F | HEART RATE: 80 BPM | WEIGHT: 133.6 LBS

## 2025-05-15 LAB
ALBUMIN SERPL BCG-MCNC: 3.1 G/DL (ref 3.5–5.2)
ALP SERPL-CCNC: 247 U/L (ref 40–150)
ALT SERPL W P-5'-P-CCNC: 13 U/L (ref 0–50)
ANION GAP SERPL CALCULATED.3IONS-SCNC: 9 MMOL/L (ref 7–15)
AST SERPL W P-5'-P-CCNC: 22 U/L (ref 0–45)
BASOPHILS # BLD AUTO: 0 10E3/UL (ref 0–0.2)
BASOPHILS NFR BLD AUTO: 0 %
BILIRUB SERPL-MCNC: 0.2 MG/DL
BUN SERPL-MCNC: 22.7 MG/DL (ref 8–23)
CALCIUM SERPL-MCNC: 8.6 MG/DL (ref 8.8–10.4)
CHLORIDE SERPL-SCNC: 108 MMOL/L (ref 98–107)
CREAT SERPL-MCNC: 1.4 MG/DL (ref 0.51–0.95)
EGFRCR SERPLBLD CKD-EPI 2021: 41 ML/MIN/1.73M2
EOSINOPHIL # BLD AUTO: 0.2 10E3/UL (ref 0–0.7)
EOSINOPHIL NFR BLD AUTO: 3 %
ERYTHROCYTE [DISTWIDTH] IN BLOOD BY AUTOMATED COUNT: 15.4 % (ref 10–15)
GLUCOSE BLDC GLUCOMTR-MCNC: 100 MG/DL (ref 70–99)
GLUCOSE BLDC GLUCOMTR-MCNC: 178 MG/DL (ref 70–99)
GLUCOSE BLDC GLUCOMTR-MCNC: 189 MG/DL (ref 70–99)
GLUCOSE BLDC GLUCOMTR-MCNC: 195 MG/DL (ref 70–99)
GLUCOSE BLDC GLUCOMTR-MCNC: 196 MG/DL (ref 70–99)
GLUCOSE BLDC GLUCOMTR-MCNC: 55 MG/DL (ref 70–99)
GLUCOSE BLDC GLUCOMTR-MCNC: 60 MG/DL (ref 70–99)
GLUCOSE BLDC GLUCOMTR-MCNC: 75 MG/DL (ref 70–99)
GLUCOSE SERPL-MCNC: 78 MG/DL (ref 70–99)
HCO3 SERPL-SCNC: 25 MMOL/L (ref 22–29)
HCT VFR BLD AUTO: 34.2 % (ref 35–47)
HGB BLD-MCNC: 10.6 G/DL (ref 11.7–15.7)
IMM GRANULOCYTES # BLD: 0 10E3/UL
IMM GRANULOCYTES NFR BLD: 0 %
LYMPHOCYTES # BLD AUTO: 1.4 10E3/UL (ref 0.8–5.3)
LYMPHOCYTES NFR BLD AUTO: 23 %
MCH RBC QN AUTO: 27.1 PG (ref 26.5–33)
MCHC RBC AUTO-ENTMCNC: 31 G/DL (ref 31.5–36.5)
MCV RBC AUTO: 88 FL (ref 78–100)
MONOCYTES # BLD AUTO: 0.5 10E3/UL (ref 0–1.3)
MONOCYTES NFR BLD AUTO: 7 %
NEUTROPHILS # BLD AUTO: 4.2 10E3/UL (ref 1.6–8.3)
NEUTROPHILS NFR BLD AUTO: 66 %
NRBC # BLD AUTO: 0 10E3/UL
NRBC BLD AUTO-RTO: 0 /100
PLATELET # BLD AUTO: 206 10E3/UL (ref 150–450)
POTASSIUM SERPL-SCNC: 4.1 MMOL/L (ref 3.4–5.3)
PROT SERPL-MCNC: 5.7 G/DL (ref 6.4–8.3)
RBC # BLD AUTO: 3.91 10E6/UL (ref 3.8–5.2)
SODIUM SERPL-SCNC: 142 MMOL/L (ref 135–145)
WBC # BLD AUTO: 6.3 10E3/UL (ref 4–11)

## 2025-05-15 PROCEDURE — 250N000013 HC RX MED GY IP 250 OP 250 PS 637: Performed by: STUDENT IN AN ORGANIZED HEALTH CARE EDUCATION/TRAINING PROGRAM

## 2025-05-15 PROCEDURE — 258N000001 HC RX 258

## 2025-05-15 PROCEDURE — 97530 THERAPEUTIC ACTIVITIES: CPT | Mod: GO

## 2025-05-15 PROCEDURE — 99232 SBSQ HOSP IP/OBS MODERATE 35: CPT | Mod: 24 | Performed by: PSYCHIATRY & NEUROLOGY

## 2025-05-15 PROCEDURE — 97530 THERAPEUTIC ACTIVITIES: CPT | Mod: GP

## 2025-05-15 PROCEDURE — 99207 PR APP CREDIT; MD BILLING SHARED VISIT: CPT

## 2025-05-15 PROCEDURE — 82310 ASSAY OF CALCIUM: CPT

## 2025-05-15 PROCEDURE — 97535 SELF CARE MNGMENT TRAINING: CPT | Mod: GO

## 2025-05-15 PROCEDURE — 99223 1ST HOSP IP/OBS HIGH 75: CPT | Performed by: STUDENT IN AN ORGANIZED HEALTH CARE EDUCATION/TRAINING PROGRAM

## 2025-05-15 PROCEDURE — G0463 HOSPITAL OUTPT CLINIC VISIT: HCPCS

## 2025-05-15 PROCEDURE — 99232 SBSQ HOSP IP/OBS MODERATE 35: CPT | Mod: FS | Performed by: STUDENT IN AN ORGANIZED HEALTH CARE EDUCATION/TRAINING PROGRAM

## 2025-05-15 PROCEDURE — 92526 ORAL FUNCTION THERAPY: CPT | Mod: GN

## 2025-05-15 PROCEDURE — 250N000011 HC RX IP 250 OP 636

## 2025-05-15 PROCEDURE — 36415 COLL VENOUS BLD VENIPUNCTURE: CPT

## 2025-05-15 PROCEDURE — 250N000013 HC RX MED GY IP 250 OP 250 PS 637

## 2025-05-15 PROCEDURE — 99232 SBSQ HOSP IP/OBS MODERATE 35: CPT | Mod: 24 | Performed by: NURSE PRACTITIONER

## 2025-05-15 PROCEDURE — 250N000009 HC RX 250: Performed by: STUDENT IN AN ORGANIZED HEALTH CARE EDUCATION/TRAINING PROGRAM

## 2025-05-15 PROCEDURE — 120N000002 HC R&B MED SURG/OB UMMC

## 2025-05-15 PROCEDURE — 97116 GAIT TRAINING THERAPY: CPT | Mod: GP

## 2025-05-15 PROCEDURE — 85004 AUTOMATED DIFF WBC COUNT: CPT

## 2025-05-15 RX ORDER — ASPIRIN 81 MG/1
324 TABLET, CHEWABLE ORAL DAILY
Status: DISCONTINUED | OUTPATIENT
Start: 2025-05-15 | End: 2025-05-29

## 2025-05-15 RX ORDER — MICONAZOLE NITRATE 20 MG/G
CREAM TOPICAL 2 TIMES DAILY
Status: DISCONTINUED | OUTPATIENT
Start: 2025-05-15 | End: 2025-06-12 | Stop reason: HOSPADM

## 2025-05-15 RX ORDER — CLOPIDOGREL BISULFATE 75 MG/1
300 TABLET ORAL ONCE
Status: COMPLETED | OUTPATIENT
Start: 2025-05-15 | End: 2025-05-15

## 2025-05-15 RX ORDER — CLOPIDOGREL BISULFATE 75 MG/1
75 TABLET ORAL DAILY
Status: DISCONTINUED | OUTPATIENT
Start: 2025-05-16 | End: 2025-05-29

## 2025-05-15 RX ADMIN — Medication 5 MG: at 21:33

## 2025-05-15 RX ADMIN — Medication 20 MG: at 08:57

## 2025-05-15 RX ADMIN — CLOPIDOGREL BISULFATE 300 MG: 75 TABLET ORAL at 14:49

## 2025-05-15 RX ADMIN — DEXTROSE MONOHYDRATE 25 ML: 25 INJECTION, SOLUTION INTRAVENOUS at 04:08

## 2025-05-15 RX ADMIN — ATORVASTATIN CALCIUM 40 MG: 40 TABLET, FILM COATED ORAL at 08:58

## 2025-05-15 RX ADMIN — CARVEDILOL 6.25 MG: 3.12 TABLET, FILM COATED ORAL at 08:58

## 2025-05-15 RX ADMIN — LEVETIRACETAM 500 MG: 100 SOLUTION ORAL at 21:36

## 2025-05-15 RX ADMIN — LEVETIRACETAM 500 MG: 100 SOLUTION ORAL at 08:57

## 2025-05-15 RX ADMIN — DICLOFENAC SODIUM 2 G: 10 GEL TOPICAL at 08:57

## 2025-05-15 RX ADMIN — GABAPENTIN 100 MG: 100 CAPSULE ORAL at 21:33

## 2025-05-15 RX ADMIN — DICLOFENAC SODIUM 2 G: 10 GEL TOPICAL at 11:59

## 2025-05-15 RX ADMIN — HEPARIN SODIUM 5000 UNITS: 5000 INJECTION, SOLUTION INTRAVENOUS; SUBCUTANEOUS at 14:49

## 2025-05-15 RX ADMIN — Medication 1000 MCG: at 08:57

## 2025-05-15 RX ADMIN — ASPIRIN 81 MG CHEWABLE TABLET 324 MG: 81 TABLET CHEWABLE at 08:57

## 2025-05-15 RX ADMIN — LEVOTHYROXINE SODIUM 88 MCG: 88 TABLET ORAL at 08:58

## 2025-05-15 RX ADMIN — DICLOFENAC SODIUM 2 G: 10 GEL TOPICAL at 21:33

## 2025-05-15 RX ADMIN — HEPARIN SODIUM 5000 UNITS: 5000 INJECTION, SOLUTION INTRAVENOUS; SUBCUTANEOUS at 06:23

## 2025-05-15 RX ADMIN — MICONAZOLE NITRATE: 20 CREAM TOPICAL at 21:34

## 2025-05-15 RX ADMIN — HEPARIN SODIUM 5000 UNITS: 5000 INJECTION, SOLUTION INTRAVENOUS; SUBCUTANEOUS at 21:33

## 2025-05-15 RX ADMIN — CARVEDILOL 6.25 MG: 3.12 TABLET, FILM COATED ORAL at 18:19

## 2025-05-15 ASSESSMENT — ACTIVITIES OF DAILY LIVING (ADL)
ADLS_ACUITY_SCORE: 75
ADLS_ACUITY_SCORE: 78
ADLS_ACUITY_SCORE: 73
ADLS_ACUITY_SCORE: 75
ADLS_ACUITY_SCORE: 78
ADLS_ACUITY_SCORE: 75
ADLS_ACUITY_SCORE: 78
ADLS_ACUITY_SCORE: 73
ADLS_ACUITY_SCORE: 78
ADLS_ACUITY_SCORE: 75
ADLS_ACUITY_SCORE: 78
ADLS_ACUITY_SCORE: 75
ADLS_ACUITY_SCORE: 78
ADLS_ACUITY_SCORE: 75
ADLS_ACUITY_SCORE: 78
ADLS_ACUITY_SCORE: 78
ADLS_ACUITY_SCORE: 75
ADLS_ACUITY_SCORE: 78
ADLS_ACUITY_SCORE: 78

## 2025-05-15 NOTE — PLAN OF CARE
Shift Hours: 0700 - 1900     Assessment:  Body systems that were not at patient's baseline Cognitive/Behavioral/Neuro, Respiratory, Peripheral Neurovascular, Gastrointestinal, Genitourinary, Skin, and Musculoskeletal. Focused body system assessments documented in flowsheets.        Activity              Fall Risk Score: 95              Bed alarm on? Yes     Activity Assistance Provided: assistance, 2 people      Assistive Device Utilized: lift device    Pain: denies     Labs/RN Managed Protocols: no RN managed labs. BG q4h stable     Lines/Drains: PIV x2 SL, NGT w/ continuous TF at goal rate of 40ml/hr w/ 90 q3h FWF    Nutrition: NPO, TF     Goal Outcome Evaluation:      Plan of Care Reviewed With: patient, child    Overall Patient Progress: no changeOverall Patient Progress: no change    Outcome Evaluation: No acute events this shift. Patient remains vitally stable on RA. Neuros unchanged this shift, no verbal communication but intermittently shaking head yes/no. Patient worked w/ PT/OT/speech therapy this shift. Woc consulted on perineum, new wound care orders placed + antifungal cream. Patient placed on isoflex pump this evening. Incontinent of bowel and bladder, multiple bms this shift. Turning and repo q1/2. Palliative consulted. CC tomorrow at 1100. Daughter at bedside this evening. Q1h rounding completed, call light w/ in reach.    Barriers to Discharge:   Continuing POC and further work up

## 2025-05-15 NOTE — CONSULTS
"  Palliative Care Consultation Note  [facility]      Patient: Isabell Matthews  Date of Admission:  5/7/2025    Requesting Clinician / Team: Jimmy Moore PA-C  Reason for consult: Goals of care       Recommendations & Counseling     GOALS OF CARE:   Plan of  care -  restorative/life-sustaining with limits (DNR/DNI)  Chart reviewed Isabell. Spoke with primary team. Seen at bedside but non-verbal.   Called and spoke with daughter Felton. Introduced role of palliative care.  Felton voiced a desire to get Isabell off Keppra. Concern this is \"making her a zombie\". She has been on this since her first stroke ~8 years prior but Felton and some others have read that it can cause cognitive issues.   Primary noted to me after my phone call that Stroke Team had spoken with Vishal about this but it seems like daughters may not be communicating optimally?  Felton also asked about the gabapentin causing cognitive side effects  I informed Felton that Stroke team was asked to come back and see Isabell due to persistent somonolence and noted that Keppra is unlikely to be contributing to it. Further noted she would be started on Plavix. Felton asked if the Plavix would be replacing the Keppra, advised her that they have different purposes and the Keppra is for seizure ppx while Plavix is for CVA ppx.  When asked about previous baseline, Felton noted that prior to admission, Isabell would hold food in her mouth and would take a really long time to swallow it. E.g. a banana would take 30 to 60 min to finish   Apologized for not being able to answer medication questions.   Voiced concern that with each additional stroke, vascular dementia worsens in a stepwise manner and that we may be getting to a point where Isabell will not make meaningful recovery. Also voiced concern for PEG tube noting it has not been shown to provide meaningful benefit in patients with dementia.  Felton acknowledged the " information above. She noted that generally speaking, she and her sister would want to focus more on quality of life than quantity.  I voiced concern for disjointed information/communication and not being able to answer all her questions. As such, she was agreeable to a care conference for tomorrow 5/16. Confirmed with primary team for 11am tomorrow who stated stroke team can attend at 11am as well.  Plan for [facility] tomorrow 5/16 @ 1100. Messaged RN CC as and FYI. Would recommend talking about prognosis in context of previous multiple strokes and vascular dementia, concerns regarding medication like Keppra and gabapentin, and then next steps (TCU? Hospice? Etc)    ADVANCE CARE PLANNING:  No health care directive on file. Per system policy, Surrogate Decision-makers for Patients With Diminished Decision-making Capacity offers guidance on possible decision-makers. Kirsty Ramey have been identified as a surrogate decision makers.   There is no POLST form on file, defer to patient and/or next of kin for decisions   Code status: No CPR- Do NOT Intubate    MEDICAL MANAGEMENT:   We are not actively managing symptoms at this time.    PSYCHOSOCIAL/SPIRITUAL SUPPORT:  Family - daughters Vishal and Felton (former is local, latter is in [place])  Some concern for caregiver burnout on Vishal's part, uncertain why she directed providers to speak with Felton instead  [employer]    Palliative Care will follow.    Total time spent was 80 minutes regarding goals of care on the date of the encounter. These recommendations were given to the primary team via this note/via [employer].    Zackary Tirado DO / Internal and Palliative Medicine   Securely message with the [employer] Web Console (learn more here)   Text page via [facility] Paging/Directory         Assessment      Isabell Matthews is a 64 year old female with a past medical history of HTN, HLD, CKD IIIb, type 1 DM c/b by neuropathy, hx of ischemic and hemorrhagic stroke  (ischemic R basal ganglia in 2018, hemorrhagic L basal ganglia in 2021, R centrum semiovale and R chuck in 2023), with residual facial droop (had right sided and left sided facial droop in past), vascular dementia, seizure disorder, hx of falls, and recurrent UTI.     She initially presented on 5/7/2025 with acute onset speech changes (soft voice), confusion, worsening of baseline cognition, and worse po intake. MRI brain showed multiple small acute infarcts in R frontal lobe near encephalomalacia from prior stroke and could likely have caused worsening of of her baseline symptoms. Stroke thought to be from ICAD.     Neuro signed off 5/10 after stroke workup but was then asked to be reinvolved 5/15 due to persistent somnolence and concern Keppra was contributing to this. Did not recommend new imaging, thought Keppra is unlikely to be contributing to AMS, and exam is similar to prior one. Made recs for ASA, Keppra, and Plavix (which was held before due to possible need for PEG).    Palliative was consulted on 5/15 for goals of care.    Today, the patient was seen for:  Hx of multiple strokes  Goals of care  Support  Palliative encounter    History of Present Illness   Met with Lisa at bedside. She would track with her eyes but was largely nonverbal with me and did not seem to respond to anything I said.     Tried calling daughter Felton.     Introduced the role of palliative care as an interdisciplinary team that cares for patients with serious illness to help support symptom management, communication, coping for patients and their families as well as support with medical decision making.    Rest of visit as above.    Prognosis, Goals, & Planning:   Functional Status just prior to this current hospitalization:  Seemingly dependent for everything    Prognosis, Goals, and/or Advance Care Planning:  As above    Code Status was addressed today:   No      Patient's decision making preferences: unable to assess         Patient has decision-making capacity today for complex decisions: Lacks decision making capacity          Coping, Meaning, & Spirituality:   Mood, coping, and/or meaning in the context of serious illness were addressed today: No    Social:   Per care management noted 5/15  Daughter Vishal Peña is a PCA resource of support 40 hours per week, and lives with the patient  Patient gets respite care 4-5 days/ month with other daughter Felton  Weekend PCA is utilized  [facility] services PT/OT/RN/SW (today RNCC confirmed that certification ends on 05/31/2025)   through [facility] working on LTC placement with [facility]    Medications:  Reviewed this patient's medication profile and medications from this hospitalization.     [place] Board of Pharmacy Data Base Reviewed: Yes:   reviewed - as below.        ROS:  Unable to obtain ROS, seemingly comfortable    Physical Exam   Vital Signs with Ranges  Temp:  [97.6  F (36.4  C)-98  F (36.7  C)] 97.6  F (36.4  C)  Pulse:  [85-88] 86  Resp:  [18] 18  BP: (139-160)/(66-72) 139/72  SpO2:  [95 %-97 %] 95 %  Wt Readings from Last 10 Encounters:   05/15/25 60.6 kg (133 lb 9.6 oz)   03/27/25 62.4 kg (137 lb 9.1 oz)   02/13/25 60 kg (132 lb 3.2 oz)   01/20/25 60.9 kg (134 lb 4.2 oz)   10/15/24 58.5 kg (129 lb)   10/07/24 58.9 kg (129 lb 14.4 oz)   07/08/24 62.1 kg (137 lb)   03/07/24 61.2 kg (135 lb)   01/29/24 60 kg (132 lb 4.8 oz)   09/04/23 59.7 kg (131 lb 9.8 oz)     133 lbs 9.58 oz  General: Not in acute distress. Elderly.  Head: Atraumatic. Normocephalic.   Eyes: Anicteric without injection. Eyes conjugate. Extraocular movements intact.  Pulmonary: Unlabored. On room air  Cardiovascular: Perfusing.   Abdomen: Non-distended. Nasal feeding tube in place  Skin: Warm.   Musculoskeletal: Joints of hand normal. Muscle bulk decreased  Neuro: Non verbal. Face symmetric. Alert, could not assess orientation  Psych: Normal mood and affect?  Could not assess due to non-verbal status    Data reviewed:  Results for orders placed or performed during the hospital encounter of 05/07/25 (from the past 24 hours)   Glucose by meter   Result Value Ref Range    GLUCOSE BY METER POCT 141 (H) 70 - 99 mg/dL   Keppra (Levetiracetam) Level   Result Value Ref Range    Keppra (Levetiracetam) Level 23.2 10.0 - 40.0  g/mL   Glucose by meter   Result Value Ref Range    GLUCOSE BY METER POCT 143 (H) 70 - 99 mg/dL   Glucose by meter   Result Value Ref Range    GLUCOSE BY METER POCT 100 (H) 70 - 99 mg/dL   Glucose by meter   Result Value Ref Range    GLUCOSE BY METER POCT 75 70 - 99 mg/dL   Glucose by meter   Result Value Ref Range    GLUCOSE BY METER POCT 55 (L) 70 - 99 mg/dL   Glucose by meter   Result Value Ref Range    GLUCOSE BY METER POCT 60 (L) 70 - 99 mg/dL   Glucose by meter   Result Value Ref Range    GLUCOSE BY METER POCT 195 (H) 70 - 99 mg/dL   CBC with Platelets & Differential    Narrative    The following orders were created for panel order CBC with Platelets & Differential.  Procedure                               Abnormality         Status                     ---------                               -----------         ------                     CBC with platelets and ...[[id number]]  Abnormal            Final result                 Please view results for these tests on the individual orders.   CBC with platelets and differential   Result Value Ref Range    WBC Count 6.3 4.0 - 11.0 10e3/uL    RBC Count 3.91 3.80 - 5.20 10e6/uL    Hemoglobin 10.6 (L) 11.7 - 15.7 g/dL    Hematocrit 34.2 (L) 35.0 - 47.0 %    MCV 88 78 - 100 fL    MCH 27.1 26.5 - 33.0 pg    MCHC 31.0 (L) 31.5 - 36.5 g/dL    RDW 15.4 (H) 10.0 - 15.0 %    Platelet Count 206 150 - 450 10e3/uL    % Neutrophils 66 %    % Lymphocytes 23 %    % Monocytes 7 %    % Eosinophils 3 %    % Basophils 0 %    % Immature Granulocytes 0 %    NRBCs per 100 WBC 0 <1 /100    Absolute Neutrophils 4.2 1.6 - 8.3 10e3/uL     Absolute Lymphocytes 1.4 0.8 - 5.3 10e3/uL    Absolute Monocytes 0.5 0.0 - 1.3 10e3/uL    Absolute Eosinophils 0.2 0.0 - 0.7 10e3/uL    Absolute Basophils 0.0 0.0 - 0.2 10e3/uL    Absolute Immature Granulocytes 0.0 <=0.4 10e3/uL    Absolute NRBCs 0.0 10e3/uL   Comprehensive metabolic panel   Result Value Ref Range    Sodium 142 135 - 145 mmol/L    Potassium 4.1 3.4 - 5.3 mmol/L    Carbon Dioxide (CO2) 25 22 - 29 mmol/L    Anion Gap 9 7 - 15 mmol/L    Urea Nitrogen 22.7 8.0 - 23.0 mg/dL    Creatinine 1.40 (H) 0.51 - 0.95 mg/dL    GFR Estimate 41 (L) >60 mL/min/1.73m2    Calcium 8.6 (L) 8.8 - 10.4 mg/dL    Chloride 108 (H) 98 - 107 mmol/L    Glucose 78 70 - 99 mg/dL    Alkaline Phosphatase 247 (H) 40 - 150 U/L    AST 22 0 - 45 U/L    ALT 13 0 - 50 U/L    Protein Total 5.7 (L) 6.4 - 8.3 g/dL    Albumin 3.1 (L) 3.5 - 5.2 g/dL    Bilirubin Total 0.2 <=1.2 mg/dL   Glucose by meter   Result Value Ref Range    GLUCOSE BY METER POCT 196 (H) 70 - 99 mg/dL   Glucose by meter   Result Value Ref Range    GLUCOSE BY METER POCT 189 (H) 70 - 99 mg/dL     No results found for this or any previous visit (from the past 24 hours).    Medical Decision Making

## 2025-05-15 NOTE — PROGRESS NOTES
Care Management Follow Up    Length of Stay (days): 8    Expected Discharge Date: 05/17/2025?     Concerns to be Addressed: discharge planning     Patient plan of care discussed at interdisciplinary rounds: Yes    Anticipated Discharge Disposition: Dual Recommendations for home care vs TCU     Anticipated Discharge Services:  TBD  Anticipated Discharge DME:  TBD    Patient/family educated on Medicare website which has current facility and service quality ratings:  Not on this encounter  Education Provided on the Discharge Plan: Ongoing  Patient/Family in Agreement with the Plan: Ongoing    Referrals Placed by CM/SW: TCU  Private pay costs discussed: Not applicable    Discussed  Partnership in Safe Discharge Planning  document with patient/family: Yes, discussed over the phone with Felton that we will try to get the patient to a TCU of their choice, but have to send multiple referrals and go with accepting facilities.    Handoff Completed: No, handoff not indicated or clinically appropriate    Additional Information:  RNCC participated at interdisciplinary rounds and performed chart review. It was noted there are dual recommendations for TCU vs home with home care.    It is known that the patient received the following services prior to admission:  zoila Peña is a PCA resource of support 40 hours per week, and lives with the patient  Patient gets respite care 4-5 days/ month with other zoila Ya  Weekend PCA is utilized  [facility] services PT/OT/RN/SW (today RNCC confirmed that certification ends on 05/31/2025)   through [facility]    The patient family is already working with the [facility] to work on LTC placement options in [place] vs [place]. They were given the following community resources on this admission: [facility] - senior living.      Per nursing charting, patient remains on 1:1, but when RNCC went to meet with the patient there was no sitter at the  bedside. It was confirmed that the sitter was discontinued yesterday evening.      RNCC spoke with patient's daughter, Felton, over the phone to discuss discharge disposition/recommendations. Family agreeable to sending a referral to [place] [facility]. They had questions about if hospice is yet on the table, but RNCC discussed that the recommendations suggest the patient's condition is likely able to be rehabilitated to her baseline. Pt's dtr, Vishal, is able to get the TCU list from patient bedside likely tomorrow to assist with choosing more TCU preferences. Felton will call her sister to let her know that the list is available for her to go over when she arrives at the hospital. If Vishal is unavailable to provide a list tomorrow, the our process for sending referrals based off Medicare.gov ratings was discussed.      TCU Referrals Sent  [facility]  P: [phone]  F: [phone]        Next Steps:   []Update [facility] on discharge plan  []Get 10 TCU preferences from Vishal tomorrow 05/16/2025  []Send hand-off at discharge (internal)  []IMM at discharge      RESOURCES  [facility]  Phone   Fax       Writing RNCC will now defer future discharge needs to the primary care management team on unit [address], and they have been made aware of this transition. It was a pleasure taking part in this patient's plan of care.    Adriana Wynn RN Care Coordinator  Reachable on [facility] or Teams  [phone] (updated daily)

## 2025-05-15 NOTE — PROGRESS NOTES
[facility]    Medicine Progress Note - Hospitalist Service, GOLD TEAM 6    Date of Admission:  5/7/2025    Assessment & Plan   Isabell Matthews is a 64 year old female with a past medical history of HTN, T1DM, HLD, diabetic neuropathy, orthostatic hypotension, seizures, vascular dementia, prior CVAs, hypothyroidism, recurrent UTIs, and frequent falls. She initially presented to the ED for evaluation of stroke-like symptoms and was admitted for further monitoring and management.     Multifocal Acute Cerebral Infarcts  Hx of Multiple CVAs  Hx of Seizures  Pt initially presented w/ new findings of dysarthria, dysphagia, decreased responsiveness, confusion. However, while in the ED, no new focal neurologic sx were observed. Head CT negative for acute infarct or hemorrhage. CTA w/ multifocal stenosis involving L RADHA (A2) and R MCA which had progressed since 1/16/25. Stroke Neuro team consulted while in the ED, and loaded with Keppra on arrival. EEG negative. ECHO showed LV 50-55%, mnild ventricular hypokinesis, RV normal size, no significant valvular abnormalities. Initially, she was found to have a UTI, so there was concern that perhaps her presentation was d/t recrudescence of prior CVA deficits, however, MRI brain did show acute infarcts in R corona radiata, precentral gyrus, and operculum. Unfortunately, she was not a candidate for advanced therapies. Aside from ongoing dysarthria, no new focal neurologic deficits. Clinically stable this morning, and though somnolent on exam, is slightly more awake than yesterday.  - Stroke Neuro team reinvolved on 5/15 given increased somnolence. They suspect multifactorial etiology and r/t recurrent CVA, recrudescence of prior CVA symptoms   - Keppra level therapeutic   - Stroke Neuro discussed keeping Keppra at 500mg BID dosing as is in d/w daughter   - Deescalate neuro checks to every shift given stability  - Given no definitive plan to  obtain enteral access via PEG-J (have access w/ NJT) will start Plavix loading dose now (300mg) followed by 75mg x90 days starting on 5/16  - Palliative Care consult placed to discuss GOC (specifically w/ daughter Felton)  - NPO per SLP for now  - Atorvastatin 40mg daily  - At discharge, will need ZioPatch x14 days mailed out  - Stroke Neuro follow-up in 8 weeks    Acute Metabolic Encephalopathy, likely multifactorial  Hx of Vascular Dementia  Acute change in status noted on arrival, possibly 2/2 acute CVA vs metabolic encephalopathy. Likely multifactorial with UTI, dehydration, hyperglycemia, hypercapnia, and hospital environment contributing, as well as newfound acute CVA as above. No significant electrolyte abnormalities. No suspicious meds. TSH 0.14.   - Monitor clinically  - Continue bedside sitter to ensure no pulling of NJT  - Delirium precautions   - Would recommend bed inclined, lights on, blinds open and TV on during the day and then at night having all these off and bed more supine to encourage proper sleep-wake cycle  - Continue melatonin 5mg at bedtime      UTI, recurrent, resolved  Urine culture from 5/1/25 shows pan-susceptible E.Coli. On arrival here, aebrile, WBC normal. No other obvious source of infection. Repeat UA/UC negative. At this point, pt has completed adequate therapy for uncomplicated UTI. However, course was extended to 7 days d/t encephalopathy.   - Completed 7 days of IV Ceftriaxone on 5/12     Dysphagia  Patient failed initial swallow study. Likely in the setting of acute encephalopathy and acute stroke. SLP consulted, recommended strict NPO. NG placed 5/10, and has now begun TFs without issues.   - Per SLP evaluation on 5/12, recs to keep NPO for now    - VFSS pending - attempted 5/13, but unable to complete d/t somnolence. Pending this being rescheduled depending on her mentation  - Resumed all enteral meds on 5/13 via NJT  - Now up to goal rate of 45mL/hr for TFs via NJT, appears  to be tolerating well     Hypernatremia, resolved  Free water deficit in setting of dysphagia and NPO status. Resolved with hypotonic fluids.   - Trend lytes daily for now especially as initiating TFs, monitor for refeeding     Possible Stress Cardiomyopathy  ECHO in the ED showed LVEF 50-55% with mid ventricular hypokinesis w/ preserved apical and mid segements c/w stress CM. No obvious signs of heart failure. Clinically, she has no s/sx to suggest acutely decompensated HF.  - Continue to monitor     Type 1 Diabetes Mellitus  A1c of 8.8% in 3/2025. BG in range of 300-400 this admission. NPO due to dysphagia. Hx of labile sugars and hypoglycemia in setting of infection. Home regimen: Lantus 18 units at bedtime. Endocrine consulted to assist with insulin dosing in setting of tube feeds. Hypoglycemic overnight to 50s.   - Endocrine following, appreciate assistance   - Adjusted Lantus to 8 units at bedtime    - Decreased NPH to 15 units qAM + 9 units qPM   - Medium sliding scale insulin Q4H for now while NPO   - BG checks Q4H while NPO   - Hypoglycemia protocol   - PRN D10 at 60mL/hr if TFs are stopped/interrupted     Hypothyroidism  Most recent TSH 0.99. Repeat TSH here 0.14. On admission, daughter noted recent medication adjustments (brand synthroid vs generic) therefore may have been over-replaced. Cannot r/o sick euthyroid state as well.  - Continue PTA PO synthroid 88mcg   - Recheck TSH in 1-2 weeks when clinically stable     Stage IIIb CKD  Baseline Cr of 1.4-1.6. Cr improved to 1.3 with IVF. Suspect chronic dehydration contributing.  - BMP daily for now  - Avoid nephrotoxic agents as best as possible     HTN  HLD  -180 on admission. Can allow for permissive hypertension in setting of acute stroke per Stroke Neuro.  - Continue PTA Carvedilol w/ hold parameters  - Continue PTA Atorvastatin w/ enteral access via NJ  - Transitioned AZ ASA to PO ASA 324mg daily as above  - IV hydralazine PRN for SBP>180      Chronic Low Back Pain  - Diclofenac gel PRN  - Continue PTA Duloxetine, Gabapentin as now have enteral access     Seasonal allergies  - Hold PTA loratidine for now to reduce pill burden via NJT            Diet: NPO for Medical/Clinical Reasons Except for: No Exceptions  Adult Formula Drip Feeding: Continuous Osmolite 1.5; Nasogastric tube; Goal Rate: 40; mL/hr; Initiate at 10ml/hr and advance by 10ml Q8H as tolerated.; Do not advance tube feeding rate unless K+ is = or > 3.0, Mg++ is = or > 1.5, and Phos is = or ...    DVT Prophylaxis: Pneumatic Compression Devices  Parker Catheter: Not present  Lines: None     Cardiac Monitoring: None  Code Status: No CPR- Do NOT Intubate      Clinically Significant Risk Factors          # Hyperchloremia: Highest Cl = 108 mmol/L in last 2 days, will monitor as appropriate          # Hypoalbuminemia: Lowest albumin = 3 g/dL at 5/14/2025  8:29 AM, will monitor as appropriate     # Hypertension: Noted on problem list     # Dementia: noted on problem list            # Financial/Environmental Concerns:           Social Drivers of Health    Food Insecurity: Unknown (3/25/2025)    Food Insecurity     Within the past 12 months, did you worry that your food would run out before you got money to buy more?: Patient unable to answer     Within the past 12 months, did the food you bought just not last and you didn t have money to get more?: Patient unable to answer   Depression: At risk (4/2/2025)    PHQ-2     PHQ-2 Score: 4   Housing Stability: Unknown (3/25/2025)    Housing Stability     Do you have housing? : Patient unable to answer     Are you worried about losing your housing?: Patient unable to answer   Tobacco Use: Medium Risk (4/24/2025)    Patient History     Smoking Tobacco Use: Former     Smokeless Tobacco Use: Never   Financial Resource Strain: Unknown (3/25/2025)    Financial Resource Strain     Within the past 12 months, have you or your family members you live with been unable  to get utilities (heat, electricity) when it was really needed?: Patient unable to answer   Transportation Needs: Unknown (3/25/2025)    Transportation Needs     Within the past 12 months, has lack of transportation kept you from medical appointments, getting your medicines, non-medical meetings or appointments, work, or from getting things that you need?: Patient unable to answer   Interpersonal Safety: Unknown (3/25/2025)    Interpersonal Safety     Do you feel physically and emotionally safe where you currently live?: Patient unable to answer     Within the past 12 months, have you been hit, slapped, kicked or otherwise physically hurt by someone?: Patient unable to answer     Within the past 12 months, have you been humiliated or emotionally abused in other ways by your partner or ex-partner?: Patient unable to answer          Disposition Plan     Medically Ready for Discharge: Anticipated in 5+ Days           The patient's care was discussed with the Attending Physician, Dr. Lubna Roman, Bedside Nurse, Patient, and Endocrinology Consultant(s).    Jimmy Moore PA-C  Hospitalist Service, [address]  Securely message with [employer] (more info)  Text page via [facility] Paging/Directory   See signed in provider for up to date coverage information  ______________________________________________________________________    Interval History   Pt seen in her room this AM. Stirs to loud voice, and open eyes today briefly. Bedside attendant reports no acute concerns this AM, and not witnessing any pulling of her lines today. No observed vomiting, coughing, or complaints of nausea with tube feeds. Seen at bedside w/ Stroke Neuro present.    Physical Exam   Vital Signs: Temp: 98  F (36.7  C) Temp src: Oral BP: (!) 160/66 Pulse: 88   Resp: 18 SpO2: 97 % O2 Device: None (Room air)    Weight: 133 lbs 9.58 oz    Constitutional: somnolent and resting in bed, cooperative to  Stroke Neuro commands, no apparent distress, and appears stated age. Able to open eyes briefly today (more than yesterday).  Eyes: lids and lashes normal  ENT: normocephalic, without obvious abnormality. NJ present at nare, no observed bleeding.  Respiratory: No increased work of breathing, good air exchange, clear to auscultation bilaterally, no crackles or wheezing  Cardiovascular: regular rate and rhythm, normal S1 and S2, no S3, no S4, and no murmur noted  GI: normal bowel sounds, soft, non-distended, and non-tender  Skin: no bruising or bleeding, no redness, warmth, or swelling, no rashes, and no lesions on visualized skin.   Musculoskeletal: no lower extremity pitting edema present, no deformities, no pain response to calf palpation.  Neurologic: Moving all extremities equally and spontaneously when stirring to voice. HTS intact bilaterally w/ Stroke Neuro commands. Opens eyes to commands.  Neuropsychiatric: General: calm  Level of consciousness: lethargic and sleeping, but briefly rouses to her name.     Medical Decision Making       80 MINUTES SPENT BY ME on the date of service doing chart review, history, exam, documentation & further activities per the note.      Data     I have personally reviewed the following data over the past 24 hrs:    6.3  \   10.6 (L)   / 206     142 108 (H) 22.7 /  189 (H)   4.1 25 1.40 (H) \     ALT: 13 AST: 22 AP: 247 (H) TBILI: 0.2   ALB: 3.1 (L) TOT PROTEIN: 5.7 (L) LIPASE: N/A       Imaging results reviewed over the past 24 hrs:   No results found for this or any previous visit (from the past 24 hours).    Recent Labs   Lab 05/15/25  1159 05/15/25  0839 05/15/25  0500 05/15/25  0459 05/14/25  1143 05/14/25  0829 05/13/25  0750 05/13/25  0631   WBC  --   --   --  6.3  --  6.0  --  5.9   HGB  --   --   --  10.6*  --  9.6*  --  9.8*   MCV  --   --   --  88  --  86  --  84   PLT  --   --   --  206  --  183  --  192   NA  --   --  142  --   --  136  --  138   POTASSIUM  --   --   4.1  --   --  4.4  --  4.1   CHLORIDE  --   --  108*  --   --  104  --  106   CO2  --   --  25  --   --  22  --  21*   BUN  --   --  22.7  --   --  21.9  --  18.5   CR  --   --  1.40*  --   --  1.44*  --  1.43*   ANIONGAP  --   --  9  --   --  10  --  11   VERONICA  --   --  8.6*  --   --  8.2*  --  8.5*   * 196* 78  --    < > 379*   < > 437*   ALBUMIN  --   --  3.1*  --   --  3.0*  --   --    PROTTOTAL  --   --  5.7*  --   --  5.2*  --   --    BILITOTAL  --   --  0.2  --   --  <0.2  --   --    ALKPHOS  --   --  247*  --   --  233*  --   --    ALT  --   --  13  --   --  15  --   --    AST  --   --  22  --   --  25  --   --     < > = values in this interval not displayed.

## 2025-05-15 NOTE — PROGRESS NOTES
Inpatient Diabetes Management Service: Daily Progress Note     HPI: Isabell Matthews is a 64 year old female with history of HTN, HLD, DM type I, diabetic neuropathy, CKD3b, orthostatic hypotension, seizures, vascular dementia, hypothyroidism, recurrent UTI's, and freuqent falls who was admitted to [facility] with concern for stroke after presenting with acute neurologic symptoms, MRI with acute infarcts within R corona radiata, precentral gyrus and operculum, though presenting symptoms do not completely explain focal deficits and not a candidate for advanced therapies. Hospital course complicated by dysphagia and now NPO per SLP with plan to start tube feedings.     IDS consulted to assist with glycemic management as well as optimization while inpatient and when applicable, recommendations for transition home.         Assessment/Plan:     Assessment:  Type 1 Diabetes Mellitus c/b peripheral neuropathy, nephropathy, hx of DKA and hypoglycemia. Sub-optimal control. (A1c 8.8 % 3/23/25, Hgb: 10.5)  Vascular dementia with acute neurologic changes and new dysphagia  Acute hyperglycemia 2/2 Tube feedings  Labile BG   BMI: 22    Plan/Recommendations:    ** patient has T1DM - requires insulin, basal dose must not to be withheld entirely OR for prolonged periods, high risk for DKA. If trending >250 mg/dL longer than 4 hours, please draw appropriate labs to determine if DKA and treat accordingly **     - Lantus 8 units q 24 hrs at 2100  (incrementally move earlier given propensity to lows in the noc - do not want to overlap more than 1 hour)  - NPH 15 unit(s) at 0900   - Reduce to NPH 9 unit(s) at 2100 (consistent hypoglycemia at 0100 - 0400)   - Novolog Correction Scale: medium resistance (ISF: 50)  every 4 hours   - BG checks q4h    - PRN D10 at 60 ml/hr if tube feeds are stopped/interrupted (dosed for Osmolite 1.5 (or equivalent) at 40 ml/hr) dosing provides 75% of CHO that would have been given by TF   -  Hypoglycemia protocol    - Carb counting protocol     Per ADA guidelines, treatment goals and recommendations for older adults with DM and medically complexity is less stringent BG control/A1c for safety - targets of 110-180 mg/dL and up to 250 mg/dL reasonable - albeit not persistent for those with T1DM. Avoid reliance on A1c. Glucose decisions should be based on avoidance of hypoglycemia and symptomatic hyperglycemia.     Discussion:     Labile BG again over the noc  - similar time again as previous night between 0100 - 0400 during peak of NPH.  Corrected with interventions as documented.  Additionally, will plan to incrementally move the HS lantus to earlier in the day. While there is no true peak for lantus, it is stronger in the first 6-8 hours. Moving timing earlier in day could also assist with the lows in the window of 0100 - 0400, do not want to eliminate do to the DKA protective nature of the lantus basal dosing.     Will aggressively reduce NPH for tonight. During day interval NPH 15 unit(s) trending safely.     Patient is minimally communicative but is answering some yes/no questions and nodding. TF remains at goal.     Per primary team Dispo: SLP rec TCU, PT rec home w assist vs TCU (no way she could return home in current state), OT yet to evaluate. Anticipate dispo to be TCU, >3-5 days when off sitter, mental status improved, nutrition plan determined       Discharge Planning: (tentative)  Medications: TBD  - adjustment of PTA dosing with addition of NPH to cover tube feeds as indicated   Needs set doses for day program, would be able to use ICR at home.   Test Claims:  none needed.   Education:  Needs to be assessed closer to discharge.    Outpatient Follow-up: PCP- Dr Kellogg ( LOV 4/24/25), until able to establish with [facility]- has appointment with Mai Figueroa 8/4/25    Please notify Inpatient Diabetes Service if changes are planned to steroids, nutrition, TPN/TF and anticipated  procedures requiring prolonged NPO status.         Interval History/Review of Systems :   The last 24 hours progress and nursing notes reviewed.    - resting in bed, no sitter for 1:1 today, notes indicate was removed at 2300 last evening.  - Resting comfortably. Minimally interactive or responsive today.       Inpatient Glucose Control:         Planned Procedures/Surgeries: none    Recent Labs   Lab 05/15/25  0416 05/15/25  0407 05/15/25  0342 05/15/25  0156 05/14/25  2346 05/14/25  2018   * 60* 55* 75 100* 143*           Medications Impacting Glycemia:   Steroids: none  D5W containing solutions/medications: none   Other medications impacting glucose: none        Nutrition:   Orders Placed This Encounter      NPO for Medical/Clinical Reasons Except for: No Exceptions    Supplements:  none  TF:   5/10: will start Osmolite 1.5 (or equivalent) at 10 ml.hr to advance by 10ml every 8 hours (goal 40 ml/hr provides Osmolite 1.5 (or equivalent)  Goal: 40 ml/hr provides 8.16 g per hour or 195 CHO in 24 hours)   5/13 - Current: been at goal of 40 ml/hr since 4/13   TPN: none         Diabetes History: see full consult note for complete diabetes history   Diabetes Type and Duration: DM for > 40 years, diagnosed in her 40s. Per daughter,  initially told she has T2DM then T1DM for a while then flipped back to being told T2DM.      6/17/2024 positive GAD65 antibody and c-peptide <0.1 but BG elevated     PTA Medication Regimen:   From discharge 3/30/25:- daughter not present to confirm   - Lantus 18 units once daily at HS  - Lispro ICR 1:15 TID AC, 1:20 PRN with snacks/supplements  - Lispro correction 1:50>150 TID AC, >200 HS     At discharge had discussed challenges around glucose lability and dosing: Lantus dose of 18 units may be too high if patient is not eating. However, daughter notes that with more dramatic changes patient will sometimes rise to 400s and has been in DKA before which is hard to balance. If she is  concerned about mom going low at night, will try to give her a snack to support Lantus dosing. She is working to resume CGM to more closely monitor blood sugars.  Missing doses?: unknown, daughter not present to confirm   Historical Diabetes Medications: various insulins and dosing      Glucose monitoring device/frequency/trends: Historically has used glucometer before meals and at bedtime. Has tried and failed multiple CGMs (issues with accuracy or patient picking CGM off).   Current trends unknown as daughter not present to discuss- historically known to be labile with needs dropping dramatically when NPO    Hypoglycemia PTA:   - Frequency: currently unknown, but historically labile.   - Severity: + history of severe unconscious lows   - Awareness: variable, not intact    - Treatment: candy, juice       Outpatient Diabetes Provider: Current PCP: Dr Kellogg ( LOV 4/24/25) ; seen by Dr Bony Castaneda in the past (LOV 10/7/24)   Endo referral placed by PCP and IDS last admission, Scheduled with Mai Figueroa PAC 8/4/25   Formal Diabetes Education/Educator: none recently        Physical Exam:   BP (!) 145/66 (BP Location: Left arm)   Pulse 85   Temp 99  F (37.2  C) (Axillary)   Resp 18   Wt 55.2 kg (121 lb 11.2 oz)   SpO2 95%   BMI 21.56 kg/m    General Appearance: Confused, lethargic, but calm - minimally interactive. NG in place   HEENT: Normocephalic, atraumatic.   Lungs:  Breathing comfortably   Abdomen: Round   Extremities:  No significant  lower extremity edema.   Skin: No obvious rashes, lesions or bruising.   Neuro: disoriented x4 at baseline    Psych:  Calm       Data:     Lab Results   Component Value Date    A1C 8.8 (H) 03/23/2025    A1C 9.6 (H) 01/16/2025    A1C 11.2 (H) 09/23/2024    A1C 10.2 (H) 05/20/2024    A1C 10.7 (H) 01/29/2024    A1C 7.8 (H) 06/21/2021    A1C 11.7 (H) 09/27/2020     ROUTINE IP LABS (Last four results)  BMP  Recent Labs   Lab 05/15/25  0416 05/15/25  0407 05/15/25  [id number]  05/15/25  0156 05/14/25  1143 05/14/25  0829 05/13/25  0750 05/13/25  0631 05/12/25  0749 05/12/25  0554 05/11/25  0752 05/11/25  0701   NA  --   --   --   --   --  136  --  138  --  140  --  140   POTASSIUM  --   --   --   --   --  4.4  --  4.1  --  3.8  --  3.6   CHLORIDE  --   --   --   --   --  104  --  106  --  109*  --  109*   VERONICA  --   --   --   --   --  8.2*  --  8.5*  --  8.9  --  8.8   CO2  --   --   --   --   --  22  --  21*  --  16*  --  22   BUN  --   --   --   --   --  21.9  --  18.5  --  14.4  --  11.8   CR  --   --   --   --   --  1.44*  --  1.43*  --  1.30*  --  1.21*   * 60* 55* 75   < > 379*   < > 437*   < > 144*   < > 161*    < > = values in this interval not displayed.     CBC  Recent Labs   Lab 05/15/25  0459 05/14/25  0829 05/13/25  0631 05/12/25  0554   WBC 6.3 6.0 5.9 6.3   RBC 3.91 3.59* 3.65* 4.16   HGB 10.6* 9.6* 9.8* 11.2*   HCT 34.2* 30.9* 30.6* 35.9   MCV 88 86 84 86   MCH 27.1 26.7 26.8 26.9   MCHC 31.0* 31.1* 32.0 31.2*   RDW 15.4* 15.4* 15.4* 15.2*    183 192 147*     INR  No lab results found in last 7 days.    Inpatient Diabetes Service will continue to follow, please don't hesitate to contact the team with any questions or concerns.     Aster Brownlee, APRN-CNP, BC-ADM   Inpatient Diabetes Service  Available on [facility] - when on duty.     To contact Inpatient Diabetes Service:     7 AM - 5 PM: Page the IDS BULMARO following the patient that day (see filed or incomplete progress notes/consult notes under Endocrinology)    OR if uncertain of provider assignment: page job code [phone]    5 PM - 7 AM: First call after hours is to primary service.    For urgent after-hours questions, page job code for on call fellow: [phone]     I spent a total of 35 minutes on the date of the encounter doing prep/post-work, chart review, history and exam, documentation and further activities per the note including lab review, multidisciplinary communication, counseling the patient and/or  coordinating care regarding acute hyper/hypoglycemic management, as well as discharge management and planning/communication.  See note for details.      Please notify inpatient diabetes service if changes are planned to steroids, nutrition, or if procedures are planned requiring prolonged NPO status.Diabetes Management Team job code: [phone]

## 2025-05-15 NOTE — CONSULTS
[facility]  WO Nurse Inpatient Assessment     Consulted for: sacrum, perineum    Summary: patient with dual incontinence, red raised rash with satellite lesion, suspect yeast. Extends across perineal area to posterior intergluteal and perianal area    [facility] nurse follow-up plan: weekly    Patient History (according to provider note(s):      64 year old female with a past medical history of HTN, T1DM, HLD, diabetic neuropathy, orthostatic hypotension, seizures, vascular dementia, prior CVAs, hypothyroidism, recurrent UTIs, and frequent falls. She initially presented to the ED for evaluation of stroke-like symptoms and was admitted for further monitoring and management.     Assessment:      Areas visualized during today's visit: Focused:, Perineal area, and Sacrum/coccyx    Skin Injury Location: perineum, perianal area      Last photo: 5/15  Skin injury due to: Fungal rash  and Incontinence associated dermatitis (IAD)  Skin history and plan of care:   patient with dual incontinence, red raised rash with satellite lesion, suspect yeast. Extends across perineal area to posterior intergluteal and perianal area. RN to order ROWENA pump for additional moisture management.   Affected area:      Skin assessment: Denudement, Erythema, Lesions-satellite, and Rash     Measurements (length x width x depth, in cm) see media     Color: normal and consistent with surrounding tissue     Temperature  normal      Drainage: none .      Color: none      Odor: mild  Pain: absent, none  Pain interventions prior to dressing change: slow and gentle cares   Treatment goal: Infection control/prevention, Decrease moisture, Maintain (prevention of deterioration), Protection, and Simplify plan of care  STATUS: initial assessment  Supplies ordered: ordered Chris antifungal , discussed with RN, and discussed with patient       Treatment Plan:     Perineal, perianal : BID and PRN  Cleanse the area with Chris  cleanse and protect, very gently with soft cloth.  Apply thin layer of Chris Antifungal paste (see EMAR)  on top of it.  With repeat application, do not scrub the paste, only remove soiled paste and reapply.  If complete removal of paste is necessary use baby oil/mineral oil (#090595) and soft wash cloth.  Ensure pt has Francis-cushion (#[id number]) while sitting up in the chair.  Use only one Covidien pad in between mattress and pt.   No brief while in bed.    Add ROWENA pump to isoflex surface    Orders: Written    RECOMMEND PRIMARY TEAM ORDER: Chris antifungal to perineal, perianal areas  Education provided: importance of repositioning, plan of care, wound progress, Infection prevention , Moisture management, Hygiene, and Off-loading pressure  Discussed plan of care with: Patient and Nurse  Notify [facility] if wound(s) deteriorate.  Nursing to notify the Provider(s) and re-consult the [facility] Nurse if new skin concern.    DATA:     Current support surface: Standard  Standard gel mattress (Isoflex), RN to order ROWENA pump  Containment of urine/stool: Incontinence Protocol and Incontinent pad in bed  BMI: Body mass index is 23.67 kg/m .   Active diet order: Orders Placed This Encounter      NPO for Medical/Clinical Reasons Except for: No Exceptions     Output: I/O last 3 completed shifts:  In: 1576 [NG/GT:936]  Out: -      Labs:   Recent Labs   Lab 05/15/25  0500 05/15/25  0459   ALBUMIN 3.1*  --    HGB  --  10.6*   WBC  --  6.3     Pressure injury risk assessment:   Sensory Perception: 3-->slightly limited  Moisture: 3-->occasionally moist  Activity: 2-->chairfast  Mobility: 2-->very limited  Nutrition: 3-->adequate  Friction and Shear: 2-->potential problem  Dayron Score: 15    Malia Arora, RN, BSN, CWOCN   Pager no longer is use, please contact through [employer]: [facility] Nurse [place]  Dept. Office Number: [phone]

## 2025-05-15 NOTE — PLAN OF CARE
Shift Hours: 1900 - 0700    Assessment:  Body systems that were not at patient's baseline Cognitive/Behavioral/Neuro, Gastrointestinal, Genitourinary, Skin, Musculoskeletal, and Safety. Focused body system assessments documented in flowsheets.        Activity     Fall Risk Score: 95   Bed alarm on? Yes     Activity Assistance Provided: assistance, 2 people      Assistive Device Utilized: gait belt, walker    Pain: Denies    Labs/RN Managed Protocols: BG q4    Lines/Drains: x2 R PIV's, R nare NG at landmark 86    Nutrition: NPO, TF infusing at GR of 40 mL/hr and 90mL FWF q3 thru R nare NG    Goal Outcome Evaluation  Plan of Care Reviewed With: patient  Overall Patient Progress: improving  Outcome Evaluation: Pt answering yes and no questions with thumbs up and shaking head yes or no. pPt not pulling at lines, sitter removed at 23:00 to see how she does ON. Pt did not pull at lines or indicate need for reinitiating sitter. Pt nonverbal with writer. NPO with TF infusing at GR of 40 mL/hr and 90mL FWF q3 thru R nare NG (landmark at 86). Pt BG did drop to 55 ON, apple juice flushed thru NG but BG only scarlett to 60. 25mL of D50 infused through R PIV, BG scarlett to 195. Pt nonweight bearing on L wrist. Brace in place over L wrist. Incont of bowel and bladder. Purewick rmeoved d/t perineum being excoriated. Incont of urine x3, bed linen changed ON as well. Pt needs AC plan at DC but may need PEG before can tolerate PO AC plan. SLP following as well to detemrine need for PEWG. OT rec DC to TCU unless pt has adequate services/help at home.    Barriers to Discharge:   Nutrition plan, AC plan, SLP assessment and possible need for PEG

## 2025-05-16 ENCOUNTER — APPOINTMENT (OUTPATIENT)
Dept: PHYSICAL THERAPY | Facility: CLINIC | Age: 65
DRG: 064 | End: 2025-05-16
Payer: COMMERCIAL

## 2025-05-16 ENCOUNTER — APPOINTMENT (OUTPATIENT)
Dept: OCCUPATIONAL THERAPY | Facility: CLINIC | Age: 65
DRG: 064 | End: 2025-05-16
Payer: COMMERCIAL

## 2025-05-16 LAB
GLUCOSE BLDC GLUCOMTR-MCNC: 146 MG/DL (ref 70–99)
GLUCOSE BLDC GLUCOMTR-MCNC: 154 MG/DL (ref 70–99)
GLUCOSE BLDC GLUCOMTR-MCNC: 171 MG/DL (ref 70–99)
GLUCOSE BLDC GLUCOMTR-MCNC: 179 MG/DL (ref 70–99)
GLUCOSE BLDC GLUCOMTR-MCNC: 187 MG/DL (ref 70–99)
GLUCOSE BLDC GLUCOMTR-MCNC: 188 MG/DL (ref 70–99)

## 2025-05-16 PROCEDURE — 250N000009 HC RX 250: Performed by: STUDENT IN AN ORGANIZED HEALTH CARE EDUCATION/TRAINING PROGRAM

## 2025-05-16 PROCEDURE — 99418 PROLNG IP/OBS E/M EA 15 MIN: CPT | Performed by: STUDENT IN AN ORGANIZED HEALTH CARE EDUCATION/TRAINING PROGRAM

## 2025-05-16 PROCEDURE — 99207 PR APP CREDIT; MD BILLING SHARED VISIT: CPT

## 2025-05-16 PROCEDURE — 250N000013 HC RX MED GY IP 250 OP 250 PS 637

## 2025-05-16 PROCEDURE — 97530 THERAPEUTIC ACTIVITIES: CPT | Mod: GP

## 2025-05-16 PROCEDURE — 99232 SBSQ HOSP IP/OBS MODERATE 35: CPT | Mod: 24 | Performed by: STUDENT IN AN ORGANIZED HEALTH CARE EDUCATION/TRAINING PROGRAM

## 2025-05-16 PROCEDURE — 120N000002 HC R&B MED SURG/OB UMMC

## 2025-05-16 PROCEDURE — 99233 SBSQ HOSP IP/OBS HIGH 50: CPT | Performed by: STUDENT IN AN ORGANIZED HEALTH CARE EDUCATION/TRAINING PROGRAM

## 2025-05-16 PROCEDURE — 99232 SBSQ HOSP IP/OBS MODERATE 35: CPT | Mod: 24 | Performed by: PSYCHIATRY & NEUROLOGY

## 2025-05-16 PROCEDURE — 99232 SBSQ HOSP IP/OBS MODERATE 35: CPT | Mod: FS | Performed by: STUDENT IN AN ORGANIZED HEALTH CARE EDUCATION/TRAINING PROGRAM

## 2025-05-16 PROCEDURE — 250N000013 HC RX MED GY IP 250 OP 250 PS 637: Performed by: STUDENT IN AN ORGANIZED HEALTH CARE EDUCATION/TRAINING PROGRAM

## 2025-05-16 PROCEDURE — 250N000011 HC RX IP 250 OP 636

## 2025-05-16 PROCEDURE — 97530 THERAPEUTIC ACTIVITIES: CPT | Mod: GO

## 2025-05-16 PROCEDURE — 97116 GAIT TRAINING THERAPY: CPT | Mod: GP

## 2025-05-16 RX ORDER — LEVETIRACETAM 100 MG/ML
250 SOLUTION ORAL 2 TIMES DAILY
Status: DISCONTINUED | OUTPATIENT
Start: 2025-05-16 | End: 2025-06-12 | Stop reason: HOSPADM

## 2025-05-16 RX ADMIN — ASPIRIN 81 MG CHEWABLE TABLET 324 MG: 81 TABLET CHEWABLE at 08:40

## 2025-05-16 RX ADMIN — CARVEDILOL 6.25 MG: 3.12 TABLET, FILM COATED ORAL at 17:32

## 2025-05-16 RX ADMIN — CARVEDILOL 6.25 MG: 3.12 TABLET, FILM COATED ORAL at 08:40

## 2025-05-16 RX ADMIN — DICLOFENAC SODIUM 2 G: 10 GEL TOPICAL at 16:34

## 2025-05-16 RX ADMIN — ATORVASTATIN CALCIUM 40 MG: 40 TABLET, FILM COATED ORAL at 08:39

## 2025-05-16 RX ADMIN — DICLOFENAC SODIUM 2 G: 10 GEL TOPICAL at 08:46

## 2025-05-16 RX ADMIN — HEPARIN SODIUM 5000 UNITS: 5000 INJECTION, SOLUTION INTRAVENOUS; SUBCUTANEOUS at 06:28

## 2025-05-16 RX ADMIN — Medication 20 MG: at 08:39

## 2025-05-16 RX ADMIN — DICLOFENAC SODIUM 2 G: 10 GEL TOPICAL at 20:43

## 2025-05-16 RX ADMIN — HEPARIN SODIUM 5000 UNITS: 5000 INJECTION, SOLUTION INTRAVENOUS; SUBCUTANEOUS at 21:45

## 2025-05-16 RX ADMIN — LEVETIRACETAM 250 MG: 100 SOLUTION ORAL at 20:53

## 2025-05-16 RX ADMIN — HEPARIN SODIUM 5000 UNITS: 5000 INJECTION, SOLUTION INTRAVENOUS; SUBCUTANEOUS at 14:53

## 2025-05-16 RX ADMIN — MICONAZOLE NITRATE: 20 CREAM TOPICAL at 09:03

## 2025-05-16 RX ADMIN — CLOPIDOGREL BISULFATE 75 MG: 75 TABLET, FILM COATED ORAL at 08:39

## 2025-05-16 RX ADMIN — LEVOTHYROXINE SODIUM 88 MCG: 88 TABLET ORAL at 08:40

## 2025-05-16 RX ADMIN — Medication 1000 MCG: at 08:39

## 2025-05-16 RX ADMIN — LEVETIRACETAM 500 MG: 100 SOLUTION ORAL at 08:39

## 2025-05-16 RX ADMIN — DICLOFENAC SODIUM 2 G: 10 GEL TOPICAL at 12:25

## 2025-05-16 RX ADMIN — GABAPENTIN 100 MG: 100 CAPSULE ORAL at 21:45

## 2025-05-16 RX ADMIN — Medication 1 MG: at 21:45

## 2025-05-16 ASSESSMENT — ACTIVITIES OF DAILY LIVING (ADL)
ADLS_ACUITY_SCORE: 80
ADLS_ACUITY_SCORE: 84
ADLS_ACUITY_SCORE: 83
ADLS_ACUITY_SCORE: 80
ADLS_ACUITY_SCORE: 79
ADLS_ACUITY_SCORE: 80
ADLS_ACUITY_SCORE: 83
ADLS_ACUITY_SCORE: 80
ADLS_ACUITY_SCORE: 80
ADLS_ACUITY_SCORE: 83
ADLS_ACUITY_SCORE: 80
ADLS_ACUITY_SCORE: 80
ADLS_ACUITY_SCORE: 84
ADLS_ACUITY_SCORE: 80
ADLS_ACUITY_SCORE: 79
ADLS_ACUITY_SCORE: 83
ADLS_ACUITY_SCORE: 83
ADLS_ACUITY_SCORE: 80
ADLS_ACUITY_SCORE: 83
ADLS_ACUITY_SCORE: 83
ADLS_ACUITY_SCORE: 75

## 2025-05-16 NOTE — PROGRESS NOTES
Inpatient Diabetes Management Service: Daily Progress Note     HPI: Isabell Matthews is a 64 year old female with history of HTN, HLD, DM type I, diabetic neuropathy, CKD3b, orthostatic hypotension, seizures, vascular dementia, hypothyroidism, recurrent UTI's, and freuqent falls who was admitted to [facility] with concern for stroke after presenting with acute neurologic symptoms, MRI with acute infarcts within R corona radiata, precentral gyrus and operculum, though presenting symptoms do not completely explain focal deficits and not a candidate for advanced therapies. Hospital course complicated by dysphagia and now NPO per SLP with plan to start tube feedings.     IDS consulted to assist with glycemic management as well as optimization while inpatient and when applicable, recommendations for transition home.         Assessment/Plan:     Assessment:  Type 1 Diabetes Mellitus c/b peripheral neuropathy, nephropathy, hx of DKA and hypoglycemia. Sub-optimal control. (A1c 8.8 % 3/23/25, Hgb: 10.5)  Vascular dementia with acute neurologic changes and new dysphagia  Acute hyperglycemia 2/2 Tube feedings  Labile BG   BMI: 22    Plan/Recommendations:  ** patient has T1DM - requires insulin, basal dose must not to be withheld entirely OR for prolonged periods, high risk for DKA. If trending >250 mg/dL longer than 4 hours, please draw appropriate labs to determine if DKA and treat accordingly **   -  Lantus 8 units q 24 hrs at 2000  - NPH 15 unit(s) at 0900   - NPH 9 unit(s) at 2100   - Novolog Correction Scale: medium resistance (ISF: 50)  every 4 hours   - BG checks q4h    - PRN D10 at 60 ml/hr if tube feeds are stopped/interrupted (dosed for Osmolite 1.5 (or equivalent) at 40 ml/hr) dosing provides 75% of CHO that would have been given by TF   - Hypoglycemia protocol    - Carb counting protocol     Per ADA guidelines, treatment goals and recommendations for older adults with DM and medically complexity is  less stringent BG control/A1c for safety - targets of 110-180 mg/dL and up to 250 mg/dL reasonable - albeit not persistent for those with T1DM. Avoid reliance on A1c. Glucose decisions should be based on avoidance of hypoglycemia and symptomatic hyperglycemia.     Discussion: Remains NPO on continuous TF. BG trending better the past 24 hrs with no episodes of hypoglycemia and BG trending below 200. Continue current diabetes regimen.     Dispo pending care conference today. Possibly TCU?     Discharge Planning: (tentative)  Medications: TBD  - adjustment of PTA dosing with addition of NPH to cover tube feeds as indicated   Needs set doses for day program, would be able to use ICR at home.   Test Claims:  none needed.   Education:  Needs to be assessed closer to discharge.    Outpatient Follow-up: PCP- Dr Kellogg ( LOV 4/24/25), until able to establish with [facility]- has appointment with Mai Figueroa 8/4/25    Please notify Inpatient Diabetes Service if changes are planned to steroids, nutrition, TPN/TF and anticipated procedures requiring prolonged NPO status.         Interval History/Review of Systems :   - The last 24 hours progress and nursing notes reviewed.  - Having care conference today at 11AM, discussing if going forward with PEG or not and goal of care.  - Limited ROS, patient opens eyes but does not respond to any questions.     Planned Procedures/Surgeries: none    Inpatient Glucose Control:       Recent Labs   Lab 05/16/25  0435 05/16/25  0023 05/15/25  2141 05/15/25  1613 05/15/25  1159 05/15/25  0839   * 179* 178* 100* 189* 196*           Medications Impacting Glycemia:   Steroids: none  D5W containing solutions/medications: none   Other medications impacting glucose: none        Nutrition:   Orders Placed This Encounter      NPO for Medical/Clinical Reasons Except for: No Exceptions    Supplements:  none  TF:   5/10: will start Osmolite 1.5 (or equivalent) at 10 ml.hr to advance  by 10ml every 8 hours (goal 40 ml/hr provides Osmolite 1.5 (or equivalent)  Goal: 40 ml/hr provides 8.16 g per hour or 195 CHO in 24 hours)   5/13 - Current: been at goal of 40 ml/hr since 5/13   TPN: none         Diabetes History: see full consult note for complete diabetes history   Diabetes Type and Duration: DM for > 40 years, diagnosed in her 40s. Per daughter,  initially told she has T2DM then T1DM for a while then flipped back to being told T2DM.      6/17/2024 positive GAD65 antibody and c-peptide <0.1 but BG elevated     PTA Medication Regimen:   From discharge 3/30/25:- daughter not present to confirm   - Lantus 18 units once daily at HS  - Lispro ICR 1:15 TID AC, 1:20 PRN with snacks/supplements  - Lispro correction 1:50>150 TID AC, >200 HS     At discharge had discussed challenges around glucose lability and dosing: Lantus dose of 18 units may be too high if patient is not eating. However, daughter notes that with more dramatic changes patient will sometimes rise to 400s and has been in DKA before which is hard to balance. If she is concerned about mom going low at night, will try to give her a snack to support Lantus dosing. She is working to resume CGM to more closely monitor blood sugars.  Missing doses?: unknown, daughter not present to confirm   Historical Diabetes Medications: various insulins and dosing      Glucose monitoring device/frequency/trends: Historically has used glucometer before meals and at bedtime. Has tried and failed multiple CGMs (issues with accuracy or patient picking CGM off).   Current trends unknown as daughter not present to discuss- historically known to be labile with needs dropping dramatically when NPO    Hypoglycemia PTA:   - Frequency: currently unknown, but historically labile.   - Severity: + history of severe unconscious lows   - Awareness: variable, not intact    - Treatment: candy, juice       Outpatient Diabetes Provider: Current PCP: Dr Kellogg ( LOV 4/24/25) ;  seen by Dr Bony Castaneda in the past (LOV 10/7/24)   Endo referral placed by PCP and IDS last admission, Scheduled with Mai Figueroa, PAC 8/4/25   Formal Diabetes Education/Educator: none recently          Physical Exam:   /55 (BP Location: Right arm, Patient Position: Semi-Cantu's, Cuff Size: Adult Regular)   Pulse 80   Temp 98  F (36.7  C) (Oral)   Resp 14   Wt 60.6 kg (133 lb 9.6 oz)   SpO2 97%   BMI 23.67 kg/m    General:  NAD, calm in bed, eyes open to voice & tracking, no responses  Lungs: unlabored breathing on RA.  Psych:   calm           Data:     Lab Results   Component Value Date    A1C 8.8 (H) 03/23/2025    A1C 9.6 (H) 01/16/2025    A1C 11.2 (H) 09/23/2024    A1C 10.2 (H) 05/20/2024    A1C 10.7 (H) 01/29/2024    A1C 7.8 (H) 06/21/2021    A1C 11.7 (H) 09/27/2020     ROUTINE IP LABS (Last four results)  BMP  Recent Labs   Lab 05/16/25  0435 05/16/25  0023 05/15/25  2141 05/15/25  1613 05/15/25  0839 05/15/25  0500 05/14/25  1143 05/14/25  0829 05/13/25  0750 05/13/25  0631 05/12/25  0749 05/12/25  0554   NA  --   --   --   --   --  142  --  136  --  138  --  140   POTASSIUM  --   --   --   --   --  4.1  --  4.4  --  4.1  --  3.8   CHLORIDE  --   --   --   --   --  108*  --  104  --  106  --  109*   VERONICA  --   --   --   --   --  8.6*  --  8.2*  --  8.5*  --  8.9   CO2  --   --   --   --   --  25  --  22  --  21*  --  16*   BUN  --   --   --   --   --  22.7  --  21.9  --  18.5  --  14.4   CR  --   --   --   --   --  1.40*  --  1.44*  --  1.43*  --  1.30*   * 179* 178* 100*   < > 78   < > 379*   < > 437*   < > 144*    < > = values in this interval not displayed.     CBC  Recent Labs   Lab 05/15/25  0459 05/14/25  0829 05/13/25  0631 05/12/25  0554   WBC 6.3 6.0 5.9 6.3   RBC 3.91 3.59* 3.65* 4.16   HGB 10.6* 9.6* 9.8* 11.2*   HCT 34.2* 30.9* 30.6* 35.9   MCV 88 86 84 86   MCH 27.1 26.7 26.8 26.9   MCHC 31.0* 31.1* 32.0 31.2*   RDW 15.4* 15.4* 15.4* 15.2*    183 192 147*      Inpatient Diabetes Service will continue to follow, please don't hesitate to contact the team with any questions or concerns.     Karrie Flood PA-C  Inpatient Diabetes Service  Pager: [phone]  Available on [facility]    To contact Inpatient Diabetes Service:     7 AM - 5 PM: Page the IDS BULMARO following the patient that day (see filed or incomplete progress notes/consult notes under Endocrinology)    OR if uncertain of provider assignment: page job code [phone]    5 PM - 7 AM: First call after hours is to primary service.    For urgent after-hours questions, page job code for on call fellow: [phone]     I spent a total of 35 minutes on the date of the encounter doing prep/post-work, chart review, history and exam, documentation and further activities per the note including lab review, multidisciplinary communication, counseling the patient and/or coordinating care regarding acute hyper/hypoglycemic management, as well as discharge management and planning/communication.  See note for details.      Please notify inpatient diabetes service if changes are planned to steroids, nutrition, or if procedures are planned requiring prolonged NPO status.Diabetes Management Team job code: [phone]

## 2025-05-16 NOTE — PLAN OF CARE
Shift Hours: 1900 - 0700    Assessment:  Body systems that were not at patient's baseline Cognitive/Behavioral/Neuro, Gastrointestinal, Genitourinary, Musculoskeletal, and Safety. Focused body system assessments documented in flowsheets.        Activity     Fall Risk Score: 95   Bed alarm on? Yes     Activity Assistance Provided: assistance, 2 people      Assistive Device Utilized: lift device    Pain: C/O back pain, scheduled voltaren gel in use- relief.     Labs/RN Managed Protocols: BG Q4. 178, 179 and 154 ON.     Lines/Drains: X2 R PIV's, NG in R nare at 86cm landmark    Nutrition: NPO with TF infusing at GR of 40mL/hr with FWF 90mL q3.     Goal Outcome Evaluation  Plan of Care Reviewed With: patient  Overall Patient Progress: no change  Outcome Evaluation: Pt answering yes and no questions with thumbs up and shaking head yes or no. Occasionally says one word answers to questions. Incont of urine with each q2 turn. Last BM 5/15. C/O back pain, scheduled voltaren gel in use- relief. Perineum and sacrum still red, looking much better without use of purewick and with use of miconazole cream BID and barrier cream with cleanups.    Barriers to Discharge:   Nutrition plan, AC plan, SLP assessment and possible need for PEG. Care conf today at 11am to discuss next steps

## 2025-05-16 NOTE — PROGRESS NOTES
[facility]    Vascular Neurology Progress Note    Interval History     No acute events overnight. Lisa appeared comfortable this morning.  Rounded did not have much spontaneous speech, but shook her head no when asked if she had a headache.  And shook her head no when asked if she had any concerns.    Hospital Course     Chief complaint: Stroke Symptoms    Isabell Matthews is a 64 year old female with a history of hypertension, hyperlipidemia, CKD IIIb, type 1 diabetes complicated by neuropathy, history of prior ischemic and hemorrhagic stroke (right basal ganglia ischemic infarct 2/2 ICAD 2018, left basal ganglia hemorrhagic stroke 2021, right centrum semiovale and right chuck 2023), with residual facial droop  (had right sided and left sided facial droop in past) and vascular dementia, seizure disorder, and recurrent UTI who had presented on 5/7 with acute onset speech changes and worsening of baseline cognition.  A stroke code was called on her presentation, she did not receive IV thrombolysis in the setting of history of ICH, minor and intermittent symptoms.  MRI was performed which showed multiple small acute infarcts in the right frontal lobe near chronic encephalomalacia from prior stroke, noted that this may have attributed to slight worsening of her baseline symptoms.  Stroke workup was performed and notably the patient has had ongoing dysphagia.  Stroke was suspected to be in the setting of ICAD and recommendation was to be on DAPT for 90 days once able to take p.o. medications.  Patient has not yet been able to take p.o. so has been receiving aspirin alone as there is ongoing determination if the patient will need a PEG tube in her holding off on initiation of Plavix until that decision is made.  The patient also had an EEG performed when she initially presented which does not show evidence of seizures.  She is continued on her PTA dose of Keppra 500 mg twice  daily. A care conference was held on 5/16/25.     Assessment and Plan     # Acute infarcts within right corona radiata, precentral gyrus, and operculum   # Acute encephalopathy, likely multifactorial (dementia, active UTI, many vascular risk factors, prior strokes, seizure history)  #History of seizures  # Dysphagia  Neurology team was reinvolved due to concern for ongoing and somnolence.  On exam, the patient was alert, did not have much spontaneous speech but was able to name, repeat, and follow simple commands, no drift in all extremities, had a left-sided facial droop, which the patient has had reported in the past, she did have some dysarthria which has been noted in the past and no other new focal neurologic deficits.  Her exam seems similar to prior.  And current exam, is less likely that the patient is in nonconvulsive status as he is able to follow commands briskly and no clear new focal neurologic deficit.  In the absence of no new focal neurologic deficit, do not feel that repeat imaging is needed at this time.  Keppra level was therapeutic, given that the patient has been on this medication for a prolonged period, feel this is less likely to be contributing to her change of mental status. Based on her history, it is possible that she would have fluctuations in her mental status.       Care conference was held on 5/16. Given that the patient has not had a seizure since 2018, it would be reasonable to decrease keppra to 250mg twice daily, if within the patient's goals of care.     - continue neurochecks qshift  - delirium precautions  - keppra level therapeutic  - decrease Keppra to 250mg BID  - continue ASA 324mg daily  - Plavix  75mg daily if PO or enteral access is secured with plan for DAPT for 90 days then ASA alone  - Cardiac monitor while hospitalized, Zio patch x14 days at discharge, if within goals of care    No further stroke evaluation is recommended, so we will sign off. Please contact us with  any additional questions.    The patient was seen and discussed with neurology attending, Dr. Wells.     Valentine Alfaro MD  Neurology Resident, PGY-2    Note: Chart documentation done in part with Dragon Voice Recognition software. Although reviewed after completion, some word and grammatical errors may remain.      Physical Examination     Temp: 98  F (36.7  C) Temp src: Oral BP: 138/55 Pulse: 80   Resp: 14 SpO2: 97 % O2 Device: None (Room air)      General Exam  General: Laying in bed in no acute distress  HEENT:  normocephalic/atraumatic  Cardio:  RRR  Pulmonary:  no respiratory distress  Abdomen:  non-distended  Extremities:  no edema  Skin:  intact     Neuro Exam  Mental Status: Awake, alert, intermittently attentive to conversation, able to follow simple commands, limited speech production this morning, on repeat exam, able to say name, speech is dysarthric  Cranial Nerves:  PERRL, EOMI, blinks to threat bilaterally, facial sensation intact to light touch, left-sided facial droop noted  (on chart review, patient has had prior right sided facial droop and prior left sided facial droop), speech is dysarthric  Motor: No abnormal movements noted, moving all extremities independently, able to maintain antigravity without drift in all extremities  Reflexes: No ankle clonus bilaterally  Sensory:  light touch sensation intact and symmetric throughout upper and lower extremities, no extinction on double simultaneous stimulation   Coordination:  normal finger-to-nose and heel-to-shin bilaterally without dysmetria  Station/Gait:  deferred    Stroke Scales       NIHSS  1a. Level of Consciousness 0-->Alert, keenly responsive   1b. LOC Questions 2-->Answers neither question correctly   1c. LOC Commands 0-->Performs both tasks correctly   2.   Best Gaze 0-->Normal   3.   Visual 0-->No visual loss   4.   Facial Palsy 1-->Minor paralysis (flattened nasolabial fold, asymmetry on smiling)   5a. Motor Arm, Left 0-->No drift, limb  holds 90 (or 45) degrees for full 10 secs   5b. Motor Arm, Right 0-->No drift, limb holds 90 (or 45) degrees for full 10 secs   6a. Motor Leg, Left 0-->No drift, leg holds 30 degree position for full 5 secs   6b. Motor Leg, right 0-->No drift, leg holds 30 degree position for full 5 secs   7.   Limb Ataxia 0-->Absent   8.   Sensory 0-->Normal, no sensory loss   9.   Best Language 1-->Mild-to-moderate aphasia, some obvious loss of fluency or facility of comprehension, without significant limitation on ideas expressed or form of expression. Reduction of speech and/or comprehension, however, makes conversation. . . (see row details)   10. Dysarthria 1-->Mild-to-moderate dysarthria, patient slurs at least some words and, at worst, can be understood with some difficulty   11. Extinction and Inattention  0-->No abnormality   Total 5 (05/16/25)       Imaging/Labs       MRI/Head CT CT head:   Impression:   1. No acute intracranial hemorrhage.  2. ASPECT Score: 10/10.  3. Chronic encephalomalacia of the right frontotemporal  periventricular region likely related to prior infarct. Chronic small  vessel ischemic disease and generalized cerebral volume loss.     CTP:  No evidence acute infarction on the CT Perfusion examination. Poor  perfusion in the right frontotemporal region compatible chronic  encephalomalacia in the region.     MRI brain:  IMPRESSION:  1. Acute infarcts within the right corona radiata, precentral gyrus,  and operculum.  2. Chronic infarcts in the right basal ganglia and left insula   Intracranial Vasculature Head CTA demonstrates no definite aneurysm is identified.  Progressed short segment stenosis/occlusion of the left A2 anterior  cerebral artery. Similar occlusion of the anterior division of the  right middle cerebral artery. Multifocal stenosis/beaded appearance of  the right middle cerebral artery M2 and M3 segments, which appears  slightly progressed from 1/16/2025 and may be related  to  atherosclerotic disease.    Cervical Vasculature Neck CTA demonstrates no hemodynamically significant stenosis of  the major cervical arteries. Less than 50%  stenosis of the right  carotid bulb and approximately 50% of calcific stenosis of the left  carotid bulb, unchanged from prior CT 1/16/2025.      Echocardiogram Pending   EKG/Telemetry Sinus tachycardia   Nonspecific ST abnormality   Abnormal ECG       LDL  5/8/2025: 60 mg/dL   A1C  3/23/2025: 8.8 %   Troponin 5/7/2025: 52 ng/L     Keppra 23.2

## 2025-05-16 NOTE — PROGRESS NOTES
[facility]    Medicine Progress Note - Hospitalist Service, GOLD TEAM 6    Date of Admission:  5/7/2025    Assessment & Plan   Isabell Matthews is a 64 year old female with a past medical history of HTN, T1DM, HLD, diabetic neuropathy, orthostatic hypotension, seizures, vascular dementia, prior CVAs, hypothyroidism, recurrent UTIs, and frequent falls. She initially presented to the ED for evaluation of stroke-like symptoms and was admitted for further monitoring and management.     Multifocal Acute Cerebral Infarcts  Hx of Multiple CVAs  Hx of Seizures  Pt initially presented w/ new findings of dysarthria, dysphagia, decreased responsiveness, confusion. However, while in the ED, no new focal neurologic sx were observed. Head CT negative for acute infarct or hemorrhage. CTA w/ multifocal stenosis involving L RADHA (A2) and R MCA which had progressed since 1/16/25. Stroke Neuro team consulted while in the ED, and loaded with Keppra on arrival. EEG negative. ECHO showed LV 50-55%, mnild ventricular hypokinesis, RV normal size, no significant valvular abnormalities. Initially, she was found to have a UTI, so there was concern that perhaps her presentation was d/t recrudescence of prior CVA deficits, however, MRI brain did show acute infarcts in R corona radiata, precentral gyrus, and operculum. Unfortunately, she was not a candidate for advanced therapies. Aside from ongoing dysarthria, no new focal neurologic deficits. Clinically stable this morning, and though somnolent on exam, is slightly more awake than yesterday and during care conference is able to perk up and vocalize short phrases.  - Stroke Neuro team reinvolved on 5/15 given increased somnolence. They suspect multifactorial etiology and r/t recurrent CVA, recrudescence of prior CVA symptoms   - Keppra level therapeutic   - After care conference, will decreased Keppra from 500mg BID --> 250mg BID   - Deescalate neuro  checks to every shift given stability  - Given no definitive plan to obtain enteral access via PEG-J (have access w/ NJT) started Plavix as part of DAPT on 5/15/25 (needs for 90 days from today)  - Palliative Care consulted, greatly appreciate assistance (see GOC below)  - NPO per SLP for now while awaiting improvement in mentation enough to coordinate VFSS (is on TF via NJ)  - Atorvastatin 40mg daily  - At discharge, will need ZioPatch x14 days mailed out  - Stroke Neuro follow-up in 8 weeks    Acute Metabolic Encephalopathy, likely multifactorial  Hx of Vascular Dementia  Acute change in status noted on arrival, possibly 2/2 acute CVA vs metabolic encephalopathy. Likely multifactorial with UTI, dehydration, hyperglycemia, hypercapnia, and hospital environment contributing, as well as newfound acute CVA as above. No significant electrolyte abnormalities. No suspicious meds. TSH 0.14.   - Stroke Neurology reinvolved on 5/15/25 as above  - Monitor clinically  - Continue bedside sitter to ensure no pulling of NJT  - Delirium precautions   - Would recommend bed inclined, lights on, blinds open and TV on during the day and then at night having all these off and bed more supine to encourage proper sleep-wake cycle  - Will decrease melatonin to 1mg at bedtime      UTI, recurrent, resolved  Urine culture from 5/1/25 shows pan-susceptible E.Coli. On arrival here, aebrile, WBC normal. No other obvious source of infection. Repeat UA/UC negative. At this point, pt has completed adequate therapy for uncomplicated UTI. However, course was extended to 7 days d/t encephalopathy.   - Completed 7 days of IV Ceftriaxone on 5/12     Dysphagia  Patient failed initial swallow study. Likely in the setting of acute encephalopathy and acute stroke. SLP consulted, recommended strict NPO. NG placed 5/10, and has now begun TFs without issues.   - Per SLP evaluation on 5/12, recs to keep NPO for now    - VFSS pending - attempted 5/13, but  unable to complete d/t somnolence. Pending this being rescheduled depending on her mentation  - Resumed all enteral meds on 5/13 via NJT  - Now up to goal rate of 45mL/hr for TFs via NJT, appears to be tolerating well     Hypernatremia, resolved  Free water deficit in setting of dysphagia and NPO status. Resolved with hypotonic fluids.   - Trend lytes daily for now especially as initiating TFs, monitor for refeeding     Possible Stress Cardiomyopathy  ECHO in the ED showed LVEF 50-55% with mid ventricular hypokinesis w/ preserved apical and mid segements c/w stress CM. No obvious signs of heart failure. Clinically, she has no s/sx to suggest acutely decompensated HF.  - Continue to monitor  - Continue Carvedilol w/ hold parameters     Type 1 Diabetes Mellitus  A1c of 8.8% in 3/2025. BG in range of 300-400 this admission. NPO due to dysphagia. Hx of labile sugars and hypoglycemia in setting of infection. Home regimen: Lantus 18 units at bedtime. Endocrine consulted to assist with insulin dosing in setting of tube feeds. Hypoglycemic overnight to 50s.   - Endocrine following, appreciate assistance   - Adjusted Lantus to 8 units at bedtime    - Decreased NPH to 15 units qAM + 9 units qPM   - Medium sliding scale insulin Q4H for now while NPO   - BG checks Q4H while NPO   - Hypoglycemia protocol   - PRN D10 at 60mL/hr if TFs are stopped/interrupted     Hypothyroidism  Most recent TSH 0.99. Repeat TSH here 0.14. On admission, daughter noted recent medication adjustments (brand synthroid vs generic) therefore may have been over-replaced. Cannot r/o sick euthyroid state as well.  - Continue PTA PO synthroid 88mcg   - Recheck TSH in 1-2 weeks when clinically stable     Stage IIIb CKD  Baseline Cr of 1.4-1.6. Cr improved to 1.3-1.4 with IVF. Suspect chronic dehydration contributing.  - BMP daily for now  - Avoid nephrotoxic agents as best as possible     HTN  HLD  -180 on admission. Can allow for permissive  hypertension in setting of acute stroke per Stroke Neuro.  - Continue PTA Carvedilol w/ hold parameters  - Continue PTA Atorvastatin w/ enteral access via NJ  - Transitioned OR ASA to PO ASA 324mg daily as above  - IV hydralazine PRN for SBP>180     Chronic Low Back Pain  - Diclofenac gel PRN  - Continue PTA Duloxetine, Gabapentin as now have enteral access     Seasonal allergies  - Hold PTA loratidine for now to reduce pill burden via NJT    Goals of Care  Palliative Care consulted on 5/15/25 given daughter (Vishal) expressing concern over the direction of the patient's care and what the future looks like for her.   - Greatly appreciate Palliative Care's involvement and spearheading of care conference today (with our team, theirs, Stroke Neuro, SW, and two daughters [Felton & Vishal]). See their excellent note outlining the details regarding today's conversation  - In brief:   - Will stay the course w/ restorative cares and remain DNR/DNI. The daughters need to take time to absorb the information from the care conference and will reconvene with us early next week regarding their decision and path forward. At this time, they would like to begin SW to place referrals to various TCUs in the [facility], while explicitly emphasizing their desire to stay away from LTC. However, they will discuss this more this weekend and get back to us early next week   - Adjusting Keppra down to 250mg BID (from 500mg BID)   - Keeping all meds otherwise the same for now          Diet: NPO for Medical/Clinical Reasons Except for: No Exceptions  Adult Formula Drip Feeding: Continuous Osmolite 1.5; Nasogastric tube; Goal Rate: 40; mL/hr; Initiate at 10ml/hr and advance by 10ml Q8H as tolerated.; Do not advance tube feeding rate unless K+ is = or > 3.0, Mg++ is = or > 1.5, and Phos is = or ...    DVT Prophylaxis: Pneumatic Compression Devices  Parker Catheter: Not present  Lines: None     Cardiac Monitoring: None  Code Status: No CPR- Do  NOT Intubate      Clinically Significant Risk Factors          # Hyperchloremia: Highest Cl = 108 mmol/L in last 2 days, will monitor as appropriate          # Hypoalbuminemia: Lowest albumin = 3 g/dL at 5/14/2025  8:29 AM, will monitor as appropriate     # Hypertension: Noted on problem list     # Dementia: noted on problem list            # Financial/Environmental Concerns:           Social Drivers of Health    Food Insecurity: Unknown (3/25/2025)    Food Insecurity     Within the past 12 months, did you worry that your food would run out before you got money to buy more?: Patient unable to answer     Within the past 12 months, did the food you bought just not last and you didn t have money to get more?: Patient unable to answer   Depression: At risk (4/2/2025)    PHQ-2     PHQ-2 Score: 4   Housing Stability: Unknown (3/25/2025)    Housing Stability     Do you have housing? : Patient unable to answer     Are you worried about losing your housing?: Patient unable to answer   Tobacco Use: Medium Risk (4/24/2025)    Patient History     Smoking Tobacco Use: Former     Smokeless Tobacco Use: Never   Financial Resource Strain: Unknown (3/25/2025)    Financial Resource Strain     Within the past 12 months, have you or your family members you live with been unable to get utilities (heat, electricity) when it was really needed?: Patient unable to answer   Transportation Needs: Unknown (3/25/2025)    Transportation Needs     Within the past 12 months, has lack of transportation kept you from medical appointments, getting your medicines, non-medical meetings or appointments, work, or from getting things that you need?: Patient unable to answer   Interpersonal Safety: Unknown (3/25/2025)    Interpersonal Safety     Do you feel physically and emotionally safe where you currently live?: Patient unable to answer     Within the past 12 months, have you been hit, slapped, kicked or otherwise physically hurt by someone?: Patient  "unable to answer     Within the past 12 months, have you been humiliated or emotionally abused in other ways by your partner or ex-partner?: Patient unable to answer          Disposition Plan     Medically Ready for Discharge: Anticipated in 5+ Days           The patient's care was discussed with the Attending Physician, Dr. Lubna Roman, Bedside Nurse, Patient, and Palliative Care, Stroke Neuro Consultant(s), KINZA.    Jimmy Moore PA-C  Hospitalist Service, [address]  Securely message with [email or web address] (more info)  Text page via [facility] Paging/Directory   See signed in provider for up to date coverage information  ______________________________________________________________________    Interval History   Pt seen in her room this AM. Stirs to loud voice, and open eyes today briefly. There has been no bedside attendant for 30 hours from 0900 as of 0900 this AM. No observed vomiting, coughing, or complaints of nausea with tube feeds.     Later seen during multidisciplinary care conference (SW, Palliative Care, Stroke Neuro, both daughter over the phone). Patient is able to state \"I love you\" to her daughters and specifically state that she \"doesn't need anything right now\". She does request some water, but writer explained rationale for NPO. She was provided a mouth swab for her dry mouth.    Physical Exam   Vital Signs: Temp: 98.2  F (36.8  C) Temp src: Axillary BP: 125/48 Pulse: 85   Resp: 16 SpO2: 99 % O2 Device: None (Room air)    Weight: 125 lbs 3.54 oz    Constitutional: somnolent and resting in bed, no apparent distress, and appears stated age. Able to open eyes briefly today (more than yesterday), and later during care conference able to vocalize short phrases to daughter's over the phone.  Eyes: lids and lashes normal  ENT: normocephalic, without obvious abnormality. NJ present at nare, no observed bleeding.  Respiratory: No increased work of breathing, good " air exchange, clear to auscultation bilaterally, no crackles or wheezing  Cardiovascular: regular rate and rhythm, normal S1 and S2, no S3, no S4, and no murmur noted  GI: normal bowel sounds, soft, non-distended, and non-tender  Skin: no bruising or bleeding, no redness, warmth, or swelling, no rashes, and no lesions on visualized skin.   Musculoskeletal: no lower extremity pitting edema present, no deformities, no pain response to calf palpation.  Neurologic: Moving all extremities equally and spontaneously when stirring to voice. Opens eyes to commands.  Neuropsychiatric: General: calm  Level of consciousness: lethargic and sleeping, but briefly rouses to her name.     Medical Decision Making       120 MINUTES SPENT BY ME on the date of service doing chart review, history, exam, documentation & further activities per the note.      Data         Imaging results reviewed over the past 24 hrs:   No results found for this or any previous visit (from the past 24 hours).    Recent Labs   Lab 05/16/25  1208 05/16/25  0805 05/16/25  0435 05/15/25  0839 05/15/25  0500 05/15/25  0459 05/14/25  1143 05/14/25  0829 05/13/25  0750 05/13/25  0631   WBC  --   --   --   --   --  6.3  --  6.0  --  5.9   HGB  --   --   --   --   --  10.6*  --  9.6*  --  9.8*   MCV  --   --   --   --   --  88  --  86  --  84   PLT  --   --   --   --   --  206  --  183  --  192   NA  --   --   --   --  142  --   --  136  --  138   POTASSIUM  --   --   --   --  4.1  --   --  4.4  --  4.1   CHLORIDE  --   --   --   --  108*  --   --  104  --  106   CO2  --   --   --   --  25  --   --  22  --  21*   BUN  --   --   --   --  22.7  --   --  21.9  --  18.5   CR  --   --   --   --  1.40*  --   --  1.44*  --  1.43*   ANIONGAP  --   --   --   --  9  --   --  10  --  11   VERONICA  --   --   --   --  8.6*  --   --  8.2*  --  8.5*   * 187* 154*   < > 78  --    < > 379*   < > 437*   ALBUMIN  --   --   --   --  3.1*  --   --  3.0*  --   --    PROTTOTAL  --   --    --   --  5.7*  --   --  5.2*  --   --    BILITOTAL  --   --   --   --  0.2  --   --  <0.2  --   --    ALKPHOS  --   --   --   --  247*  --   --  233*  --   --    ALT  --   --   --   --  13  --   --  15  --   --    AST  --   --   --   --  22  --   --  25  --   --     < > = values in this interval not displayed.

## 2025-05-16 NOTE — PROGRESS NOTES
Care Management Follow Up    Length of Stay (days): 9  Expected Discharge Date: 05/19/2025  Concerns to be Addressed: discharge planning     Patient plan of care discussed at interdisciplinary rounds: Yes  Anticipated Discharge Disposition: Transitional Care  Anticipated Discharge Services: None  Anticipated Discharge DME: None  Patient/family educated on Medicare website which has current facility and service quality ratings: yes  Education Provided on the Discharge Plan: Yes  Patient/Family in Agreement with the Plan: yes  Referrals Placed by CM/SW: Post Acute Facilities  Private pay costs discussed: Not applicable  Discussed  Partnership in Safe Discharge Planning  document with patient/family: Yes: Verbally discussed with pt's daughters today that once pt is medically ready for discharge and there is one safe discharge plan, hospital will proceed with discharge.   Handoff Completed: Yes, already completed.  Additional Information: [facility] conference @ 1100 with Neurology, Palliative, Medicine, Care Management, pt, and pt's two daughter Marcelo. POC discussed. Family torn, but ultimately in agreement of restorative care with limits and dispo plan for TCU. [facility], but agreeable to referrals to 4-5 Medicare star rated facilities near the pt's home in [place]. After TCU, family can bring pt home with appropriate services pending [facility] (has MA & Elderly Waiver). Family voicing desire to have extended meeting with  over the weekend about discharge planning and future of pt care. Expectations managed.    Writer placed warm referral to TCU liaison Beverly, who will review pt today and get back to this writer. Facility has no beds today. [facility] in [place] (P: [phone]) [address] [place] [address]. Feedback from initial review is that pt does not appear to be far off baseline. Appears to be more appropriate  "to go home with caregiver training. They asked what the nutrition plan is? They are requesting additional therapy notes and caregiver training. Lianarcisa Alonzo is on-call over the weekend.    Writer completed warm handoff by phone to weekend SW Patricia.    Next Steps: Handoff to weekend SW Patricia.     ROBIN Villarreal, [facility]  Clinical , [facility]-  Desk Phone: [phone]   Schedule: Wednesday, Thursday, Fridays (for Mondays & Tuesdays, contact job share partner Josselyn Leblancs)  Securely message with [employer] (Search Name or 7C Med Surg [phone] SW)  Text page via [facility] Paging/Directory   See signed in provider for up to date coverage information  Social Work & Care Management Department    Weekend And After Hours Care Management Contacts:  After Hours Social Work Coverage [phone] daily: Searchable in [employer] \"Adult SW After Hours On Call\"   Weekend Coverage [phone]: Searchable in Vocera \"7C Med Surg SW\" or \"7C Med Surg RNCC\"  "

## 2025-05-16 NOTE — PROGRESS NOTES
"[facility]  Palliative Care Daily Progress Note       Recommendations & Counseling     GOALS OF CARE:   Plan of  care -  restorative/life-sustaining with limits (DNR/DNI) - no permanent feeding tube  Care conference held today 5/16 with medicine, stroke team, and palliative. Family (daughters Vishal and Felton) over the phone.  Medical summary  Daughter Mel actually spoke for a large length of the meeting. She and sister Felton gave good information regarding Isabell's baseline and Piperilsa summarized current hospital events well too  Baseline prior to admission:   Not very conversant even prior to dementia (quiet, like a \"wallflower\"). Has been less conversant in general over the years but it does wax and wane (some days more chatty than others). Seems to say one word responses and short phrases. (Of note, at the end of the care conference was able to say \"yes\" and \"no\" and \"I love you\" to daughters but not as responsive to staff)  More difficulty remembering locations, only has tried to wander a few times looking for Felton (while in Nehalleleilsa's care) the past 5-6 years. Otherwise Nehalmami notes she feels very comfortable doing work and other stuff throughout the house and not having to watch Isabell all the time.   Taking longer to eat but did eat 3 meals a day. Needed some encouragement. Appetite and intake has been decreasing a bit over the months to years  Uses a walker at home  Got home health services too at home  Noted that Isabell suffered another ischemic (not hemorrhagic) stroke. Near a previous stroke area. Could affect her swallowing capability. At high risk of another stroke due to age, diabetes, and previous history.   Keppra unlikely to be contributing much to somnolence. Risk of seizure due to previous stroke history. Okay to try and wean down of Keppra if desired.   Gabapentin unlikely helping as an antiepileptic at dose of 100 mg at bedtime.   Uncertain " if she will ever recover to or near her previous baseline. No cure for vascular dementia and she will progressively get worse in the short or long term.   Felton believes that Isabell would say her quality of life prior to admission was unsatisfactory (needing diapers, less interactive, etc). She seems to be in favor of a comfort pathway.   Vishal contradicted Felton stating that she hopes that Isabell can recover and go to day programs and that may help her live a happier life and while not against a comfort pathway, seems to remain hopeful that Isabell can recover   Considerations  Family uncertain if this is the time to transition Isabell to hospice services or if she will recover enough to live a life with enough quality along with longevity  Family did emphasize a few times that in the end, they would want to focus more on quality of life than quantity  Family wanting to look into TCU (preference for [facility] then others) to ensure she that she gets the best chance at recovering if possible  Family wanting to decrease Keppra to see if that helps with somnolence  Family noting that a permanent feeding tube would NOT be within goals of care and that they would transition to hospice if adequate po intake was a concern. Okay with NJ tube for now.  Clear caregiver burnout on Piperilsa's side. Family used care conference as a way to get on the same page with each other but still need more time to chat about above considerations.  Next steps  Appreciate care management help with TCU referrals  Continue PT/OT/SLP eval  DECREASE Keppra from 500 mg BID to 250 mg BID  If Isabell decompensates due to non-reversible issues or drastically decompensates in general (e.g. needs MICU level care), would likely push conversations more toward the hospice route     ADVANCE CARE PLANNING:  No health care directive on file. Per system policy, Surrogate Decision-makers for Patients With Diminished Decision-making Capacity offers guidance on  possible decision-makers. Daughter Tanvir have been identified as a surrogate decision makers.   There is no POLST form on file, defer to patient and/or next of kin for decisions   Code status: No CPR- Do NOT Intubate     MEDICAL MANAGEMENT:   We are not actively managing symptoms at this time.      PSYCHOSOCIAL/SPIRITUAL SUPPORT:  Family - daughters Vishal and Felton (former is local, latter is in [place])  Some concern for caregiver burnout on Vishal's part, uncertain why she directed providers to speak with Felton instead  [employer] - [employer]     Palliative Care will follow peripherally through the weekend with plans to check in early next week. Please page if needed sooner.     Total time spent was 80 minutes regarding goals of care on the date of the encounter. These recommendations were given to the primary team via this note/via Transfer To.     Zackary Tirado DO / Internal and Palliative Medicine   Securely message with the Transfer To Web Console (learn more here)   Text page via [employer] Paging/Directory          Assessments          Isabell Matthews is a 64 year old female with a past medical history of HTN, HLD, CKD IIIb, type 1 DM c/b by neuropathy, hx of ischemic and hemorrhagic stroke (ischemic R basal ganglia in 2018, hemorrhagic L basal ganglia in 2021, R centrum semiovale and R chuck in 2023), with residual facial droop (had right sided and left sided facial droop in past), vascular dementia, seizure disorder, hx of falls, and recurrent UTI.      She initially presented on 5/7/2025 with acute onset speech changes (soft voice), confusion, worsening of baseline cognition, and worse po intake. MRI brain showed multiple small acute infarcts in R frontal lobe near encephalomalacia from prior stroke and could likely have caused worsening of of her baseline symptoms. Stroke thought to be from ICAD.      Neuro signed off 5/10 after stroke workup but was then asked to be reinvolved 5/15 due to persistent  somnolence and concern Keppra was contributing to this. Did not recommend new imaging, thought Keppra is unlikely to be contributing to AMS, and exam is similar to prior one. Made recs for ASA, Keppra, and Plavix (which was held before due to possible need for PEG).     Palliative was consulted on 5/15 for goals of care.     Today, the patient was seen for:  Hx of multiple strokes  Goals of care  Support  Palliative encounter            Interval History:     Chart review/discussion with unit or clinical team members:   Received Plavix 300 mg x1 loading dose then 75 mg po daily. Scheduled meds include but are not limited to Keppra 500 mg BID, Plavix 75 mg daily, gabapentin 100 mg at bedtime, Voltaren 2 g QID linwood, and Melatonin 5 mg at bedtime.    Per patient or family/caregivers today:  Seen and examined at bedside. Visit as above.           Review of Systems:     Besides above, >4 ROS was reviewed and is unremarkable          Medications:     I have reviewed this patient's medication profile and medications during this hospitalization.           Physical Exam:   Vitals were reviewed  Temp: 98  F (36.7  C) Temp src: Oral BP: (!) 143/56 Pulse: 80   Resp: 14 SpO2: 95 % O2 Device: None (Room air)    General: Not in acute distress. Elderly.  Head: Atraumatic. Normocephalic.   Eyes: Anicteric without injection. Eyes conjugate. Extraocular movements intact.  Pulmonary: Unlabored. On room air  Cardiovascular: Perfusing.   Abdomen: Non-distended. Nasal feeding tube in place  Skin: Warm.   Musculoskeletal: Joints of hand normal. Muscle bulk decreased  Neuro: Non verbal. Face symmetric. Alert, oriented to self at least  Psych: Normal mood and flat affect. Quiet. Responds to daughters, not much to staff in conversation           Data Reviewed:     Reviewed recent pertinent imaging, comments:   No new imaging    Reviewed recent labs, comments:   [facility]  Recent Labs   Lab 05/16/25  0805 05/16/25  0435 05/16/25  0023 05/15/25  4006  05/15/25  0839 05/15/25  0500 05/14/25  1143 05/14/25  0829 05/13/25  0750 05/13/25  0631 05/12/25  0749 05/12/25  0554 05/11/25  0752 05/11/25  0701 05/10/25  1959 05/10/25  1735   NA  --   --   --   --   --  142  --  136  --  138  --  140  --  140  --   --    POTASSIUM  --   --   --   --   --  4.1  --  4.4  --  4.1  --  3.8  --  3.6  --  3.8   CHLORIDE  --   --   --   --   --  108*  --  104  --  106  --  109*  --  109*  --   --    CO2  --   --   --   --   --  25  --  22  --  21*  --  16*  --  22  --   --    ANIONGAP  --   --   --   --   --  9  --  10  --  11  --  15  --  9  --   --    * 154* 179* 178*   < > 78   < > 379*   < > 437*   < > 144*   < > 161*   < >  --    BUN  --   --   --   --   --  22.7  --  21.9  --  18.5  --  14.4  --  11.8  --   --    CR  --   --   --   --   --  1.40*  --  1.44*  --  1.43*  --  1.30*  --  1.21*  --   --    GFRESTIMATED  --   --   --   --   --  41*  --  40*  --  40*  --  45*  --  49*  --   --    VERONICA  --   --   --   --   --  8.6*  --  8.2*  --  8.5*  --  8.9  --  8.8  --   --    MAG  --   --   --   --   --   --   --   --   --   --   --   --   --  1.6*  --  1.6*   PHOS  --   --   --   --   --   --   --   --   --   --   --   --   --  2.8  --  2.5   PROTTOTAL  --   --   --   --   --  5.7*  --  5.2*  --   --   --   --   --  6.0*  --   --    ALBUMIN  --   --   --   --   --  3.1*  --  3.0*  --   --   --   --   --  3.3*  --   --    BILITOTAL  --   --   --   --   --  0.2  --  <0.2  --   --   --   --   --  0.3  --   --    ALKPHOS  --   --   --   --   --  247*  --  233*  --   --   --   --   --  209*  --   --    AST  --   --   --   --   --  22  --  25  --   --   --   --   --  26  --   --    ALT  --   --   --   --   --  13  --  15  --   --   --   --   --  16  --   --     < > = values in this interval not displayed.     CBC  Recent Labs   Lab 05/15/25  0459 05/14/25  0829 05/13/25  0631 05/12/25  0554   WBC 6.3 6.0 5.9 6.3   RBC 3.91 3.59* 3.65* 4.16   HGB 10.6* 9.6* 9.8* 11.2*   HCT 34.2*  30.9* 30.6* 35.9   MCV 88 86 84 86   MCH 27.1 26.7 26.8 26.9   MCHC 31.0* 31.1* 32.0 31.2*   RDW 15.4* 15.4* 15.4* 15.2*    183 192 147*     INRNo lab results found in last 7 days.  Arterial Blood Gas  Recent Labs   Lab 05/11/25  0701 05/10/25  1142   O2PER 0 4

## 2025-05-16 NOTE — PLAN OF CARE
[phone]. Intermittently alert. Answering yes and no questions with a nod. Using one word answers at times. VSS on RA. Denied pain. Ax2 w/ walker and GB. Up in recliner x2. NJ TF @ 40 ml/hr. Incontinent of urine frequently. BM x1. Sacrum and perineum red and excoriated. Care conference completed today. Plan for TCU placement.     Goal Outcome Evaluation:      Plan of Care Reviewed With: patient    Overall Patient Progress: no change

## 2025-05-17 ENCOUNTER — APPOINTMENT (OUTPATIENT)
Dept: SPEECH THERAPY | Facility: CLINIC | Age: 65
DRG: 064 | End: 2025-05-17
Payer: COMMERCIAL

## 2025-05-17 LAB
ALBUMIN SERPL BCG-MCNC: 2.9 G/DL (ref 3.5–5.2)
ALP SERPL-CCNC: 276 U/L (ref 40–150)
ALT SERPL W P-5'-P-CCNC: 15 U/L (ref 0–50)
ANION GAP SERPL CALCULATED.3IONS-SCNC: 12 MMOL/L (ref 7–15)
AST SERPL W P-5'-P-CCNC: 25 U/L (ref 0–45)
BASOPHILS # BLD AUTO: 0 10E3/UL (ref 0–0.2)
BASOPHILS NFR BLD AUTO: 0 %
BILIRUB SERPL-MCNC: 0.2 MG/DL
BUN SERPL-MCNC: 26.1 MG/DL (ref 8–23)
CALCIUM SERPL-MCNC: 8.4 MG/DL (ref 8.8–10.4)
CHLORIDE SERPL-SCNC: 103 MMOL/L (ref 98–107)
CREAT SERPL-MCNC: 1.41 MG/DL (ref 0.51–0.95)
EGFRCR SERPLBLD CKD-EPI 2021: 41 ML/MIN/1.73M2
EOSINOPHIL # BLD AUTO: 0.2 10E3/UL (ref 0–0.7)
EOSINOPHIL NFR BLD AUTO: 3 %
ERYTHROCYTE [DISTWIDTH] IN BLOOD BY AUTOMATED COUNT: 15.3 % (ref 10–15)
GLUCOSE BLDC GLUCOMTR-MCNC: 105 MG/DL (ref 70–99)
GLUCOSE BLDC GLUCOMTR-MCNC: 133 MG/DL (ref 70–99)
GLUCOSE BLDC GLUCOMTR-MCNC: 135 MG/DL (ref 70–99)
GLUCOSE BLDC GLUCOMTR-MCNC: 279 MG/DL (ref 70–99)
GLUCOSE BLDC GLUCOMTR-MCNC: 283 MG/DL (ref 70–99)
GLUCOSE BLDC GLUCOMTR-MCNC: 351 MG/DL (ref 70–99)
GLUCOSE BLDC GLUCOMTR-MCNC: 405 MG/DL (ref 70–99)
GLUCOSE BLDC GLUCOMTR-MCNC: 71 MG/DL (ref 70–99)
GLUCOSE BLDC GLUCOMTR-MCNC: 82 MG/DL (ref 70–99)
GLUCOSE SERPL-MCNC: 322 MG/DL (ref 70–99)
HCO3 SERPL-SCNC: 25 MMOL/L (ref 22–29)
HCT VFR BLD AUTO: 31.1 % (ref 35–47)
HGB BLD-MCNC: 9.5 G/DL (ref 11.7–15.7)
IMM GRANULOCYTES # BLD: 0 10E3/UL
IMM GRANULOCYTES NFR BLD: 1 %
LYMPHOCYTES # BLD AUTO: 1.3 10E3/UL (ref 0.8–5.3)
LYMPHOCYTES NFR BLD AUTO: 23 %
MCH RBC QN AUTO: 26.4 PG (ref 26.5–33)
MCHC RBC AUTO-ENTMCNC: 30.5 G/DL (ref 31.5–36.5)
MCV RBC AUTO: 86 FL (ref 78–100)
MONOCYTES # BLD AUTO: 0.5 10E3/UL (ref 0–1.3)
MONOCYTES NFR BLD AUTO: 9 %
NEUTROPHILS # BLD AUTO: 3.6 10E3/UL (ref 1.6–8.3)
NEUTROPHILS NFR BLD AUTO: 64 %
NRBC # BLD AUTO: 0 10E3/UL
NRBC BLD AUTO-RTO: 0 /100
PLATELET # BLD AUTO: 194 10E3/UL (ref 150–450)
POTASSIUM SERPL-SCNC: 4.7 MMOL/L (ref 3.4–5.3)
PROT SERPL-MCNC: 5.4 G/DL (ref 6.4–8.3)
RBC # BLD AUTO: 3.6 10E6/UL (ref 3.8–5.2)
SODIUM SERPL-SCNC: 140 MMOL/L (ref 135–145)
WBC # BLD AUTO: 5.7 10E3/UL (ref 4–11)

## 2025-05-17 PROCEDURE — 99232 SBSQ HOSP IP/OBS MODERATE 35: CPT | Mod: 24 | Performed by: INTERNAL MEDICINE

## 2025-05-17 PROCEDURE — 250N000011 HC RX IP 250 OP 636

## 2025-05-17 PROCEDURE — 250N000013 HC RX MED GY IP 250 OP 250 PS 637: Performed by: STUDENT IN AN ORGANIZED HEALTH CARE EDUCATION/TRAINING PROGRAM

## 2025-05-17 PROCEDURE — 85025 COMPLETE CBC W/AUTO DIFF WBC: CPT

## 2025-05-17 PROCEDURE — 120N000002 HC R&B MED SURG/OB UMMC

## 2025-05-17 PROCEDURE — 82435 ASSAY OF BLOOD CHLORIDE: CPT

## 2025-05-17 PROCEDURE — 250N000009 HC RX 250: Performed by: STUDENT IN AN ORGANIZED HEALTH CARE EDUCATION/TRAINING PROGRAM

## 2025-05-17 PROCEDURE — 250N000013 HC RX MED GY IP 250 OP 250 PS 637

## 2025-05-17 PROCEDURE — 99233 SBSQ HOSP IP/OBS HIGH 50: CPT | Mod: FS | Performed by: STUDENT IN AN ORGANIZED HEALTH CARE EDUCATION/TRAINING PROGRAM

## 2025-05-17 PROCEDURE — 36415 COLL VENOUS BLD VENIPUNCTURE: CPT

## 2025-05-17 PROCEDURE — 92526 ORAL FUNCTION THERAPY: CPT | Mod: GN

## 2025-05-17 PROCEDURE — 82565 ASSAY OF CREATININE: CPT

## 2025-05-17 PROCEDURE — 99207 PR APP CREDIT; MD BILLING SHARED VISIT: CPT

## 2025-05-17 RX ADMIN — Medication 20 MG: at 08:37

## 2025-05-17 RX ADMIN — HEPARIN SODIUM 5000 UNITS: 5000 INJECTION, SOLUTION INTRAVENOUS; SUBCUTANEOUS at 13:19

## 2025-05-17 RX ADMIN — CLOPIDOGREL BISULFATE 75 MG: 75 TABLET, FILM COATED ORAL at 08:37

## 2025-05-17 RX ADMIN — CARVEDILOL 6.25 MG: 3.12 TABLET, FILM COATED ORAL at 08:36

## 2025-05-17 RX ADMIN — LEVOTHYROXINE SODIUM 88 MCG: 88 TABLET ORAL at 08:37

## 2025-05-17 RX ADMIN — ASPIRIN 81 MG CHEWABLE TABLET 324 MG: 81 TABLET CHEWABLE at 08:30

## 2025-05-17 RX ADMIN — HEPARIN SODIUM 5000 UNITS: 5000 INJECTION, SOLUTION INTRAVENOUS; SUBCUTANEOUS at 21:04

## 2025-05-17 RX ADMIN — Medication 1000 MCG: at 08:36

## 2025-05-17 RX ADMIN — ATORVASTATIN CALCIUM 40 MG: 40 TABLET, FILM COATED ORAL at 08:37

## 2025-05-17 RX ADMIN — CARVEDILOL 6.25 MG: 3.12 TABLET, FILM COATED ORAL at 17:53

## 2025-05-17 RX ADMIN — DICLOFENAC SODIUM 2 G: 10 GEL TOPICAL at 13:19

## 2025-05-17 RX ADMIN — LEVETIRACETAM 250 MG: 100 SOLUTION ORAL at 08:37

## 2025-05-17 RX ADMIN — Medication 1 MG: at 21:04

## 2025-05-17 RX ADMIN — DICLOFENAC SODIUM 2 G: 10 GEL TOPICAL at 17:53

## 2025-05-17 RX ADMIN — MICONAZOLE NITRATE: 20 CREAM TOPICAL at 09:31

## 2025-05-17 RX ADMIN — GABAPENTIN 100 MG: 100 CAPSULE ORAL at 21:04

## 2025-05-17 RX ADMIN — LEVETIRACETAM 250 MG: 100 SOLUTION ORAL at 20:57

## 2025-05-17 RX ADMIN — MICONAZOLE NITRATE: 20 CREAM TOPICAL at 20:57

## 2025-05-17 RX ADMIN — DICLOFENAC SODIUM 2 G: 10 GEL TOPICAL at 20:57

## 2025-05-17 RX ADMIN — DICLOFENAC SODIUM 2 G: 10 GEL TOPICAL at 08:29

## 2025-05-17 ASSESSMENT — ACTIVITIES OF DAILY LIVING (ADL)
ADLS_ACUITY_SCORE: 90
ADLS_ACUITY_SCORE: 84
ADLS_ACUITY_SCORE: 80
ADLS_ACUITY_SCORE: 80
ADLS_ACUITY_SCORE: 90
ADLS_ACUITY_SCORE: 80
ADLS_ACUITY_SCORE: 90
ADLS_ACUITY_SCORE: 84
ADLS_ACUITY_SCORE: 80
ADLS_ACUITY_SCORE: 80
ADLS_ACUITY_SCORE: 90
ADLS_ACUITY_SCORE: 84
ADLS_ACUITY_SCORE: 90
ADLS_ACUITY_SCORE: 84
ADLS_ACUITY_SCORE: 90
ADLS_ACUITY_SCORE: 90

## 2025-05-17 NOTE — PROGRESS NOTES
Care Management Follow Up    Length of Stay (days): 10    Expected Discharge Date: 05/19/2025     Concerns to be Addressed: discharge planning     Patient plan of care discussed at interdisciplinary rounds: Yes    Anticipated Discharge Disposition: Transitional Care  Anticipated Discharge Services: None  Anticipated Discharge DME: None    Patient/family educated on Medicare website which has current facility and service quality ratings: yes  Education Provided on the Discharge Plan: Yes  Patient/Family in Agreement with the Plan: yes    Referrals Placed by CM/SW: Post Acute Facilities  Pending:  [facility]  [address] [place]  [address]  P: [phone]  F: [phone]  5.17: still reviewing    [facility]  [address]  [place]  [address]  P: [phone]  F: [phone]    [facility]  [address] [place]  [address]  P: [phone]  F: [phone]    [facility]  [address] [facility] [address]  Admissions: [phone]   [place] Admissions: [phone]  F: [phone]    [facility]  [address] [address]  Ph: [phone]  F: [phone]    [facility]  [address] [place]  [address]  P: [phone]  F: [phone]  Private pay costs discussed: Not applicable    Discussed  Partnership in Safe Discharge Planning  document with patient/family: No     Handoff Completed: No, handoff not indicated or clinically appropriate    Additional Information:    Sent referrals by rating and distance from [facility] per request of week day SW.    Will meet with pt daughters to answer questions later today    Next Steps:     Talk to pt daughters  TCU placement  PAS  Transport  IMM    MONICA Guzman  7C       Social Work and Care Management Department   SEARCHABLE in [employer] - search SOCIAL WORK   [place] (0800 - 1630) Saturday and Sunday   Units: 4A Vocera, 4C  Vocera, & 4E Vocera      Units: 5A 8668-3810 Vocera, 5A 2995-0847 Vocera , BMT SW 1 BMT SW 2, BMT SW 3 & BMT SW 4  5C Off Service 5401 - 5416  5C Off Service [phone]   Units: 6A Vocera & 6B Vocera    Units: 6C Vocera   Units: 7A Vocera & 7B Vocera    Units: 7C Med Surg 7401 thru 7418 and 7C Med Surg 7502 thru 7521    Unit: [place] [facility] Vocera & [place] Obs Vocera     [facility] (9733-2330) Saturday and Sunday    Units: 5 Ortho Vocera, 5 Med Surg Vocera & WB ED Vocera   Units: 6 Med Surg Vocera, 8 Med Surg Vocera, & 10 ICU Vocera     After hours Vocera [facility] and After Hours Vocera [place]   Please NOTE changes to times below:  **Saturday & Sunday (1630 - 2030)  **Mon-Fri (2882-9549)   **[facility] Recognized Holidays  (1504-6770)  Units: ALL   - see above VOCERA links to units

## 2025-05-17 NOTE — PROGRESS NOTES
Nutrition Brief - contacted by the provider regarding patient on TF with loose stool    Chart reviewed/ labs noted/ GI output noted:   Current TF formula: Osmolite 1.5 Antonio (or equivalent) @ goal of  40ml/hr  (960ml/day) provides: 1440 kcals (260 60 g PRO (1.1), 731 ml free H20, 195 g CHO, and 0 g fiber daily.   Diet: NPO    Interventions:  Discussed with team. Switched formula to a high soluble fiber formula for Ninja Blocks standard 1.4 via NG tube.    - Desi AllFreed Standard 1.4 (or equivalent) @ 45 mL/hr (1800 mL) provides 1512 kcal (27 kcal/kg), 67 g pro (1.2 g/kg), 170 g CHO, 768 mL free water, and 17 g fiber daily.       Recommendation:   - Please notify endocrine of TF change and lower carb provision.       Gloria Cotter, RD/LD  Unit 7C ([phone]) and ([phone]),  Monday-Friday: Vocera -> (7C clinical Dietitian),   Weekend/Holiday: Vocera -> (Weekend Clinical Dietitian)

## 2025-05-17 NOTE — PLAN OF CARE
Shift Hours: 0700 - 1900     Assessment:  Body systems that were not at patient's baseline Cognitive/Behavioral/Neuro, Gastrointestinal, Genitourinary, Skin, and Musculoskeletal. Focused body system assessments documented in flowsheets.        Activity              Fall Risk Score: 110              Bed alarm on? Yes     Activity Assistance Provided: assistance, 2 people      Assistive Device Utilized: lift device    Pain: denies pain this shift    Labs/RN Managed Protocols: no RN managed labs, BG q4, labile today    Lines/Drains: NGT infusing TF @ goal rate, PIV x2 SL     Nutrition: NPO/TF    Goal Outcome Evaluation:      Plan of Care Reviewed With: patient    Overall Patient Progress: no changeOverall Patient Progress: no change    Outcome Evaluation: No acute events this shift. Neuros unchanged this shift, little to no verbal communication but intermittently shaking head yes/no. Wound cares completed. TF formula changed and rate increased. BG high, extra dose of NPH given and sliding scale orders adjusted. Patient BG then started to down trend. A new TF pump ordered as current pump kept occluding which could have been a factor of BG dropping. Q1h rounding completed, q1/2 turning and repos, call light w/ in reach.    Barriers to Discharge:   Placement

## 2025-05-17 NOTE — PROGRESS NOTES
Inpatient Diabetes Management Service: Daily Progress Note     HPI: Isabell Matthews is a 64 year old female with history of HTN, HLD, DM type I, diabetic neuropathy, CKD3b, orthostatic hypotension, seizures, vascular dementia, hypothyroidism, recurrent UTI's, and freuqent falls who was admitted to [facility] with concern for stroke after presenting with acute neurologic symptoms, MRI with acute infarcts within R corona radiata, precentral gyrus and operculum, though presenting symptoms do not completely explain focal deficits and not a candidate for advanced therapies. Hospital course complicated by dysphagia and now NPO per SLP with plan to start tube feedings.     IDS consulted to assist with glycemic management as well as optimization while inpatient and when applicable, recommendations for transition home.         Assessment/Plan:     Assessment:  Type 1 Diabetes Mellitus c/b peripheral neuropathy, nephropathy, hx of DKA and hypoglycemia. Sub-optimal control. (A1c 8.8 % 3/23/25, Hgb: 10.5)  Vascular dementia with acute neurologic changes and new dysphagia  Acute hyperglycemia 2/2 Tube feedings  Labile BG   BMI: 22    Plan/Recommendations:  ** patient has T1DM - requires insulin, basal dose must not to be withheld entirely OR for prolonged periods, high risk for DKA. If trending >250 mg/dL longer than 4 hours, please draw appropriate labs to determine if DKA and treat accordingly **   -  Lantus 8 units q 24 hrs at 2000  - NPH 15 unit(s) at 0900   - NPH increased to 12 unit(s) at 2100   - Novolog Correction Scale: medium resistance (ISF: 50)  every 4 hours   - BG checks q4h    - PRN D10 at 60 ml/hr if tube feeds are stopped/interrupted (dosed for Osmolite 1.5 (or equivalent) at 40 ml/hr) dosing provides 75% of CHO that would have been given by TF   - Hypoglycemia protocol    - Carb counting protocol     Per ADA guidelines, treatment goals and recommendations for older adults with DM and medically  complexity is less stringent BG control/A1c for safety - targets of 110-180 mg/dL and up to 250 mg/dL reasonable - albeit not persistent for those with T1DM. Avoid reliance on A1c. Glucose decisions should be based on avoidance of hypoglycemia and symptomatic hyperglycemia.     Discussion: Hyperglycemic overnight, continuing with continuous tube feeds    Dispo pending care conference today. Possibly TCU?     Discharge Planning: (tentative)  Medications: TBD  - adjustment of PTA dosing with addition of NPH to cover tube feeds as indicated   Needs set doses for day program, would be able to use ICR at home.   Test Claims:  none needed.   Education:  Needs to be assessed closer to discharge.    Outpatient Follow-up: PCP- Dr Kellogg ( LOV 4/24/25), until able to establish with [facility]- has appointment with Mai Figueroa 8/4/25    Please notify Inpatient Diabetes Service if changes are planned to steroids, nutrition, TPN/TF and anticipated procedures requiring prolonged NPO status.         Interval History/Review of Systems :   - The last 24 hours progress and nursing notes reviewed.  Continues enteral nutrition. Hyperglycemic overnight after NPH was reduced from 15 units to 9 units.     Planned Procedures/Surgeries: none    Inpatient Glucose Control:       Recent Labs   Lab 05/17/25  0803 05/17/25  0601 05/17/25  0417 05/17/25  0029 05/16/25  2007 05/16/25  1619   * 322* 279* 283* 171* 146*           Medications Impacting Glycemia:   Steroids: none  D5W containing solutions/medications: none   Other medications impacting glucose: none        Nutrition:   Orders Placed This Encounter      NPO for Medical/Clinical Reasons Except for: No Exceptions    Supplements:  none  TF:   5/10: will start Osmolite 1.5 (or equivalent) at 10 ml.hr to advance by 10ml every 8 hours (goal 40 ml/hr provides Osmolite 1.5 (or equivalent)  Goal: 40 ml/hr provides 8.16 g per hour or 195 CHO in 24 hours)   5/13 - Current:  been at goal of 40 ml/hr since 5/13   TPN: none         Diabetes History: see full consult note for complete diabetes history   Diabetes Type and Duration: DM for > 40 years, diagnosed in her 40s. Per daughter,  initially told she has T2DM then T1DM for a while then flipped back to being told T2DM.      6/17/2024 positive GAD65 antibody and c-peptide <0.1 but BG elevated     PTA Medication Regimen:   From discharge 3/30/25:- daughter not present to confirm   - Lantus 18 units once daily at HS  - Lispro ICR 1:15 TID AC, 1:20 PRN with snacks/supplements  - Lispro correction 1:50>150 TID AC, >200 HS     At discharge had discussed challenges around glucose lability and dosing: Lantus dose of 18 units may be too high if patient is not eating. However, daughter notes that with more dramatic changes patient will sometimes rise to 400s and has been in DKA before which is hard to balance. If she is concerned about mom going low at night, will try to give her a snack to support Lantus dosing. She is working to resume CGM to more closely monitor blood sugars.  Missing doses?: unknown, daughter not present to confirm   Historical Diabetes Medications: various insulins and dosing      Glucose monitoring device/frequency/trends: Historically has used glucometer before meals and at bedtime. Has tried and failed multiple CGMs (issues with accuracy or patient picking CGM off).   Current trends unknown as daughter not present to discuss- historically known to be labile with needs dropping dramatically when NPO    Hypoglycemia PTA:   - Frequency: currently unknown, but historically labile.   - Severity: + history of severe unconscious lows   - Awareness: variable, not intact    - Treatment: candy, juice       Outpatient Diabetes Provider: Current PCP: Dr Kellogg ( LOV 4/24/25) ; seen by Dr Bony Castaneda in the past (LOV 10/7/24)   Endo referral placed by PCP and IDS last admission, Scheduled with NEGRA Ramirez 8/4/25   Formal  Diabetes Education/Educator: none recently          Physical Exam:   BP (!) 133/98   Pulse 94   Temp 98.5  F (36.9  C) (Oral)   Resp 14   Wt 56.8 kg (125 lb 3.5 oz)   SpO2 96%   BMI 22.19 kg/m    General:  NAD, calm in bed, eyes open to voice & tracking, no responses  Lungs: unlabored breathing on RA.  Psych:   calm           Data:     Lab Results   Component Value Date    A1C 8.8 (H) 03/23/2025    A1C 9.6 (H) 01/16/2025    A1C 11.2 (H) 09/23/2024    A1C 10.2 (H) 05/20/2024    A1C 10.7 (H) 01/29/2024    A1C 7.8 (H) 06/21/2021    A1C 11.7 (H) 09/27/2020     ROUTINE IP LABS (Last four results)  BMP  Recent Labs   Lab 05/17/25  0803 05/17/25  0601 05/17/25  0417 05/17/25  0029 05/15/25  0839 05/15/25  0500 05/14/25  1143 05/14/25  0829 05/13/25  0750 05/13/25  0631   NA  --  140  --   --   --  142  --  136  --  138   POTASSIUM  --  4.7  --   --   --  4.1  --  4.4  --  4.1   CHLORIDE  --  103  --   --   --  108*  --  104  --  106   VERONICA  --  8.4*  --   --   --  8.6*  --  8.2*  --  8.5*   CO2  --  25  --   --   --  25  --  22  --  21*   BUN  --  26.1*  --   --   --  22.7  --  21.9  --  18.5   CR  --  1.41*  --   --   --  1.40*  --  1.44*  --  1.43*   * 322* 279* 283*   < > 78   < > 379*   < > 437*    < > = values in this interval not displayed.     CBC  Recent Labs   Lab 05/17/25  0601 05/15/25  0459 05/14/25  0829 05/13/25  0631   WBC 5.7 6.3 6.0 5.9   RBC 3.60* 3.91 3.59* 3.65*   HGB 9.5* 10.6* 9.6* 9.8*   HCT 31.1* 34.2* 30.9* 30.6*   MCV 86 88 86 84   MCH 26.4* 27.1 26.7 26.8   MCHC 30.5* 31.0* 31.1* 32.0   RDW 15.3* 15.4* 15.4* 15.4*    206 183 192         Js Monroy MD  Endocrinology Fellow     Please notify inpatient diabetes service if changes are planned to steroids, nutrition, or if procedures are planned requiring prolonged NPO status.Diabetes Management Team job code: [id number]    I, Nataly Long, was present with the fellow who participated in the service and in the documentation of the  note.  I have verified the history and personally performed the physical exam and medical decision making.  I agree with the assessment and plan of care as documented in the note.     Nataly Long MD

## 2025-05-17 NOTE — PROGRESS NOTES
Diabetes and Endocrinology     Brief Note.     Patient is increasing hyperglycemic. Due to diarrhea, formula is being changed with new formula having 13 % carbohydrates.     Recent Labs   Lab 05/17/25  1313 05/17/25  0803 05/17/25  0601 05/17/25  0417 05/17/25  0029 05/16/25 2007   * 351* 322* 279* 283* 171*         Due to ongoing hyperglycemia the following changes were made    - Additional 10 units of NPH   - Change to high correction  - Depending on blood sugar trends over the following hours will increase PM NPH insulin dose to either 20 units or 25 units    Js Monroy MD  Endocrinology Fellow

## 2025-05-17 NOTE — PLAN OF CARE
Goal Outcome Evaluation:    Shift Hours: 1900 - 0700    Assessment:  Body systems assessments were at patient's baseline.        Activity     Fall Risk Score: 95   Bed alarm on? Yes     Activity Assistance Provided: assistance, 2 people      Assistive Device Utilized: lift device    Pain: None per pt and presentation    Labs/RN Managed Protocols: None    Lines/Drains: PIV SL    Nutrition: Strict NPO, TF contx 40    Goal Outcome Evaluation    Plan of Care Reviewed With: patient  Overall Patient Progress: no change    Outcome Evaluation: Patient rested intermittently. Mentation remains same, occasional one word responses but correct and logical. Rounded Q1H, bed alarm on. Repositioned Q2H, is able to turn self well, pillows applied Q2. Incontinent urine and bowel this shift. Skin intact, cream applied after incontinence. VSS. Meds through NJ, TF contx through NJ. Britled. BG monitored Q4, corrected per sliding scale. CPOC.    Barriers to Discharge:     TCU/continuing care conference conversations regarding POC.

## 2025-05-17 NOTE — PROGRESS NOTES
[facility]    Medicine Progress Note - Hospitalist Service, GOLD TEAM 6    Date of Admission:  5/7/2025    Assessment & Plan   Isabell Matthews is a 64 year old female with a past medical history of HTN, T1DM, HLD, diabetic neuropathy, orthostatic hypotension, seizures, vascular dementia, prior CVAs, hypothyroidism, recurrent UTIs, and frequent falls. She initially presented to the ED for evaluation of stroke-like symptoms and was admitted for further monitoring and management.     Multifocal Acute Cerebral Infarcts  Hx of Multiple CVAs  Hx of Seizures  Pt initially presented w/ new findings of dysarthria, dysphagia, decreased responsiveness, confusion. However, while in the ED, no new focal neurologic sx were observed. Head CT negative for acute infarct or hemorrhage. CTA w/ multifocal stenosis involving L RADHA (A2) and R MCA which had progressed since 1/16/25. Stroke Neuro team consulted while in the ED, and loaded with Keppra on arrival. EEG negative. ECHO showed LV 50-55%, mnild ventricular hypokinesis, RV normal size, no significant valvular abnormalities. Initially, she was found to have a UTI, so there was concern that perhaps her presentation was d/t recrudescence of prior CVA deficits, however, MRI brain did show acute infarcts in R corona radiata, precentral gyrus, and operculum. Unfortunately, she was not a candidate for advanced therapies. Aside from ongoing dysarthria, no new focal neurologic deficits. Clinically stable this morning, more awake on exam, but less conversant.  - Stroke Neuro team reinvolved on 5/15 given increased somnolence. They suspect multifactorial etiology and r/t recurrent CVA, recrudescence of prior CVA symptoms   - Keppra level therapeutic   - After care conference on 5/16, decreased Keppra from 500mg BID --> 250mg BID   - Deescalate neuro checks to every shift given stability  - Given no definitive plan to obtain enteral access via PEG-J  (have access w/ NJT) started Plavix as part of DAPT on 5/15/25 (needs for 90 days from 5/15) - however, a PEG-J seems to be outside the GOC per daughters' decision (but they are discussing GOC between each other more this weekend)  - Palliative Care consulted, greatly appreciate assistance (see GOC below)  - NPO per SLP for now while awaiting improvement in mentation enough to coordinate VFSS (is on TF via NJ)  - Atorvastatin 40mg daily  - At discharge, will need ZioPatch x14 days mailed out  - Stroke Neuro follow-up in 8 weeks    Acute Metabolic Encephalopathy, likely multifactorial  Hx of Vascular Dementia  Acute change in status noted on arrival, possibly 2/2 acute CVA vs metabolic encephalopathy. Likely multifactorial with UTI, dehydration, hyperglycemia, hypercapnia, and hospital environment contributing, as well as newfound acute CVA as above. No significant electrolyte abnormalities. No suspicious meds. TSH 0.14.   - Stroke Neurology reinvolved on 5/15/25 as above  - Monitor clinically  - no sitter since 5/15  - Delirium precautions   - Would recommend bed inclined, lights on, blinds open and TV on during the day and then at night having all these off and bed more supine to encourage proper sleep-wake cycle  - Continue melatonin 1mg at bedtime      UTI, recurrent, resolved  Urine culture from 5/1/25 shows pan-susceptible E.Coli. On arrival here, aebrile, WBC normal. No other obvious source of infection. Repeat UA/UC negative. At this point, pt has completed adequate therapy for uncomplicated UTI. However, course was extended to 7 days d/t encephalopathy.   - Completed 7 days of IV Ceftriaxone on 5/12     Dysphagia  Patient failed initial swallow study. Likely in the setting of acute encephalopathy and acute stroke. SLP consulted, recommended strict NPO. NG placed 5/10, and has now begun TFs without issues.   - Per SLP evaluation on 5/12, recs to keep NPO for now    - VFSS pending - attempted 5/13, but unable  to complete d/t somnolence. Pending this being rescheduled depending on her mentation  - Resumed all enteral meds on 5/13 via NJT  - Now up to goal rate of 45mL/hr for TFs via NJT, appears to be tolerating well    Loose Stools  Several loose stools documented the past few days; no low suspicion of active infection given VSS, afebrile, no leukocytosis. Low suspicion medication-induced as has tolerated all her current meds PTA w/o issues (though solutions can cause loose stools). Most likely culprit is new tube feeds here.  - D/w RD today who will switch formula to high-fiber solution   - Given 25g of fewer carbs in this new formula (previous 195g/daily, new formula will be 170g/daily), I notified Endocrine     Hypernatremia, resolved  Free water deficit in setting of dysphagia and NPO status. Resolved with hypotonic fluids.   - Trend lytes daily for now especially as initiating TFs, monitor for refeeding     Possible Stress Cardiomyopathy  ECHO in the ED showed LVEF 50-55% with mid ventricular hypokinesis w/ preserved apical and mid segements c/w stress CM. No obvious signs of heart failure. Clinically, she has no s/sx to suggest acutely decompensated HF.  - Continue to monitor  - Continue Carvedilol w/ hold parameters     Type 1 Diabetes Mellitus  A1c of 8.8% in 3/2025. BG in range of 300-400 this admission. NPO due to dysphagia. Hx of labile sugars and hypoglycemia in setting of infection. Home regimen: Lantus 18 units at bedtime. Endocrine consulted to assist with insulin dosing in setting of tube feeds. Has been hyperglycemic overnight 140s-350s.   - Endocrine following, appreciate assistance   - Adjusted Lantus to 8 units at bedtime    - Decreased NPH to 15 units qAM + 9 units qPM   - Medium sliding scale insulin Q4H for now while NPO   - BG checks Q4H while NPO   - Hypoglycemia protocol   - PRN D10 at 60mL/hr if TFs are stopped/interrupted     Hypothyroidism  Most recent TSH 0.99. Repeat TSH here 0.14. On  admission, daughter noted recent medication adjustments (brand synthroid vs generic) therefore may have been over-replaced. Cannot r/o sick euthyroid state as well.  - Continue PTA PO synthroid 88mcg   - Recheck TSH in 1-2 weeks when clinically stable     Stage IIIb CKD  Baseline Cr of 1.4-1.6. Cr improved to 1.3-1.4 with IVF. Suspect chronic dehydration contributing.  - BMP daily for now  - Avoid nephrotoxic agents as best as possible     HTN  HLD  -180 on admission. Can allow for permissive hypertension in setting of acute stroke per Stroke Neuro.  - Continue PTA Carvedilol w/ hold parameters  - Continue PTA Atorvastatin w/ enteral access via NJ  - Transitioned SC ASA to PO ASA 324mg daily as above  - IV hydralazine PRN for SBP>180     Chronic Low Back Pain  - Diclofenac gel PRN  - Continue PTA Duloxetine, Gabapentin as now have enteral access     Seasonal allergies  - Hold PTA loratidine for now to reduce pill burden via NJT    Goals of Care  Palliative Care consulted on 5/15/25 given daughter (Vishal) expressing concern over the direction of the patient's care and what the future looks like for her.   - Greatly appreciate Palliative Care's involvement and spearheading of care conference on 5/16 (with our team, theirs, Stroke Neuro, SW, and two daughters [Felton & Vishal]). See their excellent note outlining the details regarding conversation  - In brief:   - Will stay the course w/ restorative cares and but to be DNR/DNI. The daughters need to take time to absorb the information from the care conference and will reconvene with us early next week regarding their decision and path forward. At this time, they would like to begin SW to place referrals to various TCUs in the [facility], while explicitly emphasizing their desire to stay away from LTC. However, they will discuss this more this weekend and get back to us early next week   - Adjusted Keppra down to 250mg BID (from 500mg BID)   - Keeping all  meds otherwise the same for now          Diet: NPO for Medical/Clinical Reasons Except for: No Exceptions  Adult Formula Drip Feeding: Continuous Osmolite 1.5; Nasogastric tube; Goal Rate: 40; mL/hr; Initiate at 10ml/hr and advance by 10ml Q8H as tolerated.; Do not advance tube feeding rate unless K+ is = or > 3.0, Mg++ is = or > 1.5, and Phos is = or ...    DVT Prophylaxis: Pneumatic Compression Devices  Parker Catheter: Not present  Lines: None     Cardiac Monitoring: None  Code Status: No CPR- Do NOT Intubate      Clinically Significant Risk Factors               # Hypoalbuminemia: Lowest albumin = 2.9 g/dL at 5/17/2025  6:01 AM, will monitor as appropriate     # Hypertension: Noted on problem list     # Dementia: noted on problem list            # Financial/Environmental Concerns:           Social Drivers of Health    Food Insecurity: Unknown (5/17/2025)    Food Insecurity     Within the past 12 months, did you worry that your food would run out before you got money to buy more?: Patient unable to answer     Within the past 12 months, did the food you bought just not last and you didn t have money to get more?: Patient unable to answer   Depression: At risk (4/2/2025)    PHQ-2     PHQ-2 Score: 4   Housing Stability: Unknown (5/17/2025)    Housing Stability     Do you have housing? : Patient unable to answer     Are you worried about losing your housing?: Patient unable to answer   Tobacco Use: Medium Risk (4/24/2025)    Patient History     Smoking Tobacco Use: Former     Smokeless Tobacco Use: Never   Financial Resource Strain: Unknown (5/17/2025)    Financial Resource Strain     Within the past 12 months, have you or your family members you live with been unable to get utilities (heat, electricity) when it was really needed?: Patient unable to answer   Transportation Needs: Unknown (5/17/2025)    Transportation Needs     Within the past 12 months, has lack of transportation kept you from medical appointments,  "getting your medicines, non-medical meetings or appointments, work, or from getting things that you need?: Patient unable to answer   Interpersonal Safety: Unknown (5/17/2025)    Interpersonal Safety     Do you feel physically and emotionally safe where you currently live?: Patient unable to answer     Within the past 12 months, have you been hit, slapped, kicked or otherwise physically hurt by someone?: Patient unable to answer     Within the past 12 months, have you been humiliated or emotionally abused in other ways by your partner or ex-partner?: Patient unable to answer          Disposition Plan     Medically Ready for Discharge: Anticipated in 5+ Days           The patient's care was discussed with the Attending Physician, Dr. Lubna Roman, Bedside Nurse, Patient, and Endocrine Consultant(s), ABISAI Moore PA-C  Hospitalist Service, [address]  Securely message with [employer] (more info)  Text page via [facility] Paging/Directory   See signed in provider for up to date coverage information  ______________________________________________________________________    Interval History   Pt seen in her room this AM. She is seen following cares w/ RN and CNA. More awake. No observed vomiting, coughing, or complaints of nausea with tube feeds. PT shakes her head \"no\" when asked if dyspnea, chest pain, headaches, vision changes, or abdominal pain.    Physical Exam   Vital Signs: Temp: 98.5  F (36.9  C) Temp src: Oral BP: (!) 133/98 Pulse: 94   Resp: 14 SpO2: 96 % O2 Device: None (Room air)    Weight: 125 lbs 3.54 oz    Constitutional: awake and alert, and appears stated age.   Eyes: lids and lashes normal  ENT: normocephalic, without obvious abnormality. NJ present at nare, no observed bleeding.  Respiratory: No increased work of breathing, good air exchange, clear to auscultation bilaterally, no crackles or wheezing  Cardiovascular: regular rate and rhythm, " normal S1 and S2, no S3, no S4, and no murmur noted  GI: normal bowel sounds, soft, non-distended, and non-tender  Skin: no bruising or bleeding, no redness, warmth, or swelling, no rashes, and no lesions on visualized skin.   Musculoskeletal: no lower extremity pitting edema present, no deformities, no pain response to calf palpation.  Neurologic: Moving all extremities equally and spontaneously when stirring to voice. Opens eyes to commands.  Neuropsychiatric: General: calm  Level of consciousness: awake, alert. Tracks to voice.    Medical Decision Making       50 MINUTES SPENT BY ME on the date of service doing chart review, history, exam, documentation & further activities per the note.      Data     I have personally reviewed the following data over the past 24 hrs:    5.7  \   9.5 (L)   / 194     140 103 26.1 (H) /  351 (H)   4.7 25 1.41 (H) \     ALT: 15 AST: 25 AP: 276 (H) TBILI: 0.2   ALB: 2.9 (L) TOT PROTEIN: 5.4 (L) LIPASE: N/A       Imaging results reviewed over the past 24 hrs:   No results found for this or any previous visit (from the past 24 hours).    Recent Labs   Lab 05/17/25  0803 05/17/25  0601 05/17/25  0417 05/15/25  0839 05/15/25  0500 05/15/25  0459 05/14/25  1143 05/14/25  0829   WBC  --  5.7  --   --   --  6.3  --  6.0   HGB  --  9.5*  --   --   --  10.6*  --  9.6*   MCV  --  86  --   --   --  88  --  86   PLT  --  194  --   --   --  206  --  183   NA  --  140  --   --  142  --   --  136   POTASSIUM  --  4.7  --   --  4.1  --   --  4.4   CHLORIDE  --  103  --   --  108*  --   --  104   CO2  --  25  --   --  25  --   --  22   BUN  --  26.1*  --   --  22.7  --   --  21.9   CR  --  1.41*  --   --  1.40*  --   --  1.44*   ANIONGAP  --  12  --   --  9  --   --  10   VERONICA  --  8.4*  --   --  8.6*  --   --  8.2*   * 322* 279*   < > 78  --    < > 379*   ALBUMIN  --  2.9*  --   --  3.1*  --   --  3.0*   PROTTOTAL  --  5.4*  --   --  5.7*  --   --  5.2*   BILITOTAL  --  0.2  --   --  0.2  --    --  <0.2   ALKPHOS  --  276*  --   --  247*  --   --  233*   ALT  --  15  --   --  13  --   --  15   AST  --  25  --   --  22  --   --  25    < > = values in this interval not displayed.

## 2025-05-18 ENCOUNTER — APPOINTMENT (OUTPATIENT)
Dept: GENERAL RADIOLOGY | Facility: CLINIC | Age: 65
End: 2025-05-18
Attending: PHYSICIAN ASSISTANT
Payer: COMMERCIAL

## 2025-05-18 LAB
GLUCOSE BLDC GLUCOMTR-MCNC: 105 MG/DL (ref 70–99)
GLUCOSE BLDC GLUCOMTR-MCNC: 125 MG/DL (ref 70–99)
GLUCOSE BLDC GLUCOMTR-MCNC: 171 MG/DL (ref 70–99)
GLUCOSE BLDC GLUCOMTR-MCNC: 173 MG/DL (ref 70–99)
GLUCOSE BLDC GLUCOMTR-MCNC: 220 MG/DL (ref 70–99)
GLUCOSE BLDC GLUCOMTR-MCNC: 83 MG/DL (ref 70–99)
GLUCOSE BLDC GLUCOMTR-MCNC: 96 MG/DL (ref 70–99)

## 2025-05-18 PROCEDURE — 250N000011 HC RX IP 250 OP 636

## 2025-05-18 PROCEDURE — 99207 PR APP CREDIT; MD BILLING SHARED VISIT: CPT

## 2025-05-18 PROCEDURE — 120N000002 HC R&B MED SURG/OB UMMC

## 2025-05-18 PROCEDURE — 99232 SBSQ HOSP IP/OBS MODERATE 35: CPT | Mod: 24 | Performed by: INTERNAL MEDICINE

## 2025-05-18 PROCEDURE — 74018 RADEX ABDOMEN 1 VIEW: CPT

## 2025-05-18 PROCEDURE — 250N000013 HC RX MED GY IP 250 OP 250 PS 637: Performed by: STUDENT IN AN ORGANIZED HEALTH CARE EDUCATION/TRAINING PROGRAM

## 2025-05-18 PROCEDURE — 250N000013 HC RX MED GY IP 250 OP 250 PS 637

## 2025-05-18 PROCEDURE — 74018 RADEX ABDOMEN 1 VIEW: CPT | Mod: 26 | Performed by: STUDENT IN AN ORGANIZED HEALTH CARE EDUCATION/TRAINING PROGRAM

## 2025-05-18 PROCEDURE — 250N000009 HC RX 250: Performed by: STUDENT IN AN ORGANIZED HEALTH CARE EDUCATION/TRAINING PROGRAM

## 2025-05-18 RX ADMIN — Medication 1 MG: at 21:17

## 2025-05-18 RX ADMIN — DICLOFENAC SODIUM 2 G: 10 GEL TOPICAL at 08:33

## 2025-05-18 RX ADMIN — Medication 20 MG: at 08:46

## 2025-05-18 RX ADMIN — LEVETIRACETAM 250 MG: 100 SOLUTION ORAL at 21:17

## 2025-05-18 RX ADMIN — ATORVASTATIN CALCIUM 40 MG: 40 TABLET, FILM COATED ORAL at 08:46

## 2025-05-18 RX ADMIN — DICLOFENAC SODIUM 2 G: 10 GEL TOPICAL at 16:12

## 2025-05-18 RX ADMIN — CARVEDILOL 6.25 MG: 3.12 TABLET, FILM COATED ORAL at 21:17

## 2025-05-18 RX ADMIN — Medication 1000 MCG: at 08:46

## 2025-05-18 RX ADMIN — HEPARIN SODIUM 5000 UNITS: 5000 INJECTION, SOLUTION INTRAVENOUS; SUBCUTANEOUS at 14:18

## 2025-05-18 RX ADMIN — DICLOFENAC SODIUM 2 G: 10 GEL TOPICAL at 20:06

## 2025-05-18 RX ADMIN — CARVEDILOL 6.25 MG: 3.12 TABLET, FILM COATED ORAL at 08:46

## 2025-05-18 RX ADMIN — DICLOFENAC SODIUM 2 G: 10 GEL TOPICAL at 12:11

## 2025-05-18 RX ADMIN — MICONAZOLE NITRATE: 20 CREAM TOPICAL at 08:33

## 2025-05-18 RX ADMIN — HEPARIN SODIUM 5000 UNITS: 5000 INJECTION, SOLUTION INTRAVENOUS; SUBCUTANEOUS at 05:49

## 2025-05-18 RX ADMIN — LEVETIRACETAM 250 MG: 100 SOLUTION ORAL at 08:46

## 2025-05-18 RX ADMIN — GABAPENTIN 100 MG: 100 CAPSULE ORAL at 21:17

## 2025-05-18 RX ADMIN — MICONAZOLE NITRATE: 20 CREAM TOPICAL at 20:06

## 2025-05-18 RX ADMIN — ASPIRIN 81 MG CHEWABLE TABLET 324 MG: 81 TABLET CHEWABLE at 08:45

## 2025-05-18 RX ADMIN — HEPARIN SODIUM 5000 UNITS: 5000 INJECTION, SOLUTION INTRAVENOUS; SUBCUTANEOUS at 21:17

## 2025-05-18 RX ADMIN — CLOPIDOGREL BISULFATE 75 MG: 75 TABLET, FILM COATED ORAL at 08:46

## 2025-05-18 RX ADMIN — LEVOTHYROXINE SODIUM 88 MCG: 88 TABLET ORAL at 08:46

## 2025-05-18 ASSESSMENT — ACTIVITIES OF DAILY LIVING (ADL)
ADLS_ACUITY_SCORE: 90
ADLS_ACUITY_SCORE: 90
ADLS_ACUITY_SCORE: 98
ADLS_ACUITY_SCORE: 90
ADLS_ACUITY_SCORE: 94
ADLS_ACUITY_SCORE: 90
ADLS_ACUITY_SCORE: 90
ADLS_ACUITY_SCORE: 94
ADLS_ACUITY_SCORE: 90
ADLS_ACUITY_SCORE: 98
ADLS_ACUITY_SCORE: 94
ADLS_ACUITY_SCORE: 90
ADLS_ACUITY_SCORE: 98
ADLS_ACUITY_SCORE: 94
ADLS_ACUITY_SCORE: 94
ADLS_ACUITY_SCORE: 98
ADLS_ACUITY_SCORE: 90

## 2025-05-18 NOTE — PROGRESS NOTES
Inpatient Diabetes Management Service: Daily Progress Note     HPI: Isabell Matthews is a 64 year old female with history of HTN, HLD, DM type I, diabetic neuropathy, CKD3b, orthostatic hypotension, seizures, vascular dementia, hypothyroidism, recurrent UTI's, and freuqent falls who was admitted to [facility] with concern for stroke after presenting with acute neurologic symptoms, MRI with acute infarcts within R corona radiata, precentral gyrus and operculum, though presenting symptoms do not completely explain focal deficits and not a candidate for advanced therapies. Hospital course complicated by dysphagia and now NPO per SLP with plan to start tube feedings.     IDS consulted to assist with glycemic management as well as optimization while inpatient and when applicable, recommendations for transition home.         Assessment/Plan:     Assessment:  Type 1 Diabetes Mellitus c/b peripheral neuropathy, nephropathy, hx of DKA and hypoglycemia. Sub-optimal control. (A1c 8.8 % 3/23/25, Hgb: 10.5)  Vascular dementia with acute neurologic changes and new dysphagia  Acute hyperglycemia 2/2 Tube feedings  Labile BG   BMI: 22    Plan/Recommendations:  ** patient has T1DM - requires insulin, basal dose must not to be withheld entirely OR for prolonged periods, high risk for DKA. If trending >250 mg/dL longer than 4 hours, please draw appropriate labs to determine if DKA and treat accordingly **   -  Lantus 8 units q 24 hrs at 2000  - NPH 20 unit(s) at 0900   - NPH 12 unit(s) at 2100   - Novolog Correction Scale: medium resistance (ISF: 50)  every 4 hours   - BG checks q4h    - PRN D10 at 60 ml/hr if tube feeds are stopped/interrupted (dosed for Osmolite 1.5 (or equivalent) at 40 ml/hr) dosing provides 75% of CHO that would have been given by TF   - Hypoglycemia protocol    - Carb counting protocol     Per ADA guidelines, treatment goals and recommendations for older adults with DM and medically complexity is  less stringent BG control/A1c for safety - targets of 110-180 mg/dL and up to 250 mg/dL reasonable - albeit not persistent for those with T1DM. Avoid reliance on A1c. Glucose decisions should be based on avoidance of hypoglycemia and symptomatic hyperglycemia.     Discussion: Tolerating current formula.   After she was given additional 10 units of NPH at midday, she was mildly hypoglycemic. PM NPH was not increased due to this.   Given that she has required more corrective insulin during the day, will increase AM insulin dose to 20 units and keep PM insulin dose at 12 units.   Dispo pending care conference today. Possibly TCU?     Discharge Planning: (tentative)  Medications: TBD  - adjustment of PTA dosing with addition of NPH to cover tube feeds as indicated   Needs set doses for day program, would be able to use ICR at home.   Test Claims:  none needed.   Education:  Needs to be assessed closer to discharge.    Outpatient Follow-up: PCP- Dr Kellogg ( LOV 4/24/25), until able to establish with [facility]- has appointment with Mai Figueroa 8/4/25    Please notify Inpatient Diabetes Service if changes are planned to steroids, nutrition, TPN/TF and anticipated procedures requiring prolonged NPO status.         Interval History/Review of Systems :   - The last 24 hours progress and nursing notes reviewed.  Continues now on formula with higher fiber content, 13% less carbohydrates than prior formula.     She received 37 units of NPH plus 17 units of correction yesterday in addition to 8 units of basal insulin.     Planned Procedures/Surgeries: none    Inpatient Glucose Control:       Recent Labs   Lab 05/18/25  0946 05/18/25  0546 05/18/25  0158 05/17/25  2214 05/17/25  2054 05/17/25  1945   * 173* 171* 135* 105* 82           Medications Impacting Glycemia:   Steroids: none  D5W containing solutions/medications: none   Other medications impacting glucose: none        Nutrition:   Orders Placed This  Encounter      NPO for Medical/Clinical Reasons Except for: No Exceptions    Supplements:  none  TF:   5/10: will start Osmolite 1.5 (or equivalent) at 10 ml.hr to advance by 10ml every 8 hours (goal 40 ml/hr provides Osmolite 1.5 (or equivalent)  Goal: 40 ml/hr provides 8.16 g per hour or 195 CHO in 24 hours)   5/13 - Current: been at goal of 40 ml/hr since 5/13   TPN: none         Diabetes History: see full consult note for complete diabetes history   Diabetes Type and Duration: DM for > 40 years, diagnosed in her 40s. Per daughter,  initially told she has T2DM then T1DM for a while then flipped back to being told T2DM.      6/17/2024 positive GAD65 antibody and c-peptide <0.1 but BG elevated     PTA Medication Regimen:   From discharge 3/30/25:- daughter not present to confirm   - Lantus 18 units once daily at HS  - Lispro ICR 1:15 TID AC, 1:20 PRN with snacks/supplements  - Lispro correction 1:50>150 TID AC, >200 HS     At discharge had discussed challenges around glucose lability and dosing: Lantus dose of 18 units may be too high if patient is not eating. However, daughter notes that with more dramatic changes patient will sometimes rise to 400s and has been in DKA before which is hard to balance. If she is concerned about mom going low at night, will try to give her a snack to support Lantus dosing. She is working to resume CGM to more closely monitor blood sugars.  Missing doses?: unknown, daughter not present to confirm   Historical Diabetes Medications: various insulins and dosing      Glucose monitoring device/frequency/trends: Historically has used glucometer before meals and at bedtime. Has tried and failed multiple CGMs (issues with accuracy or patient picking CGM off).   Current trends unknown as daughter not present to discuss- historically known to be labile with needs dropping dramatically when NPO    Hypoglycemia PTA:   - Frequency: currently unknown, but historically labile.   - Severity: +  history of severe unconscious lows   - Awareness: variable, not intact    - Treatment: candy, juice       Outpatient Diabetes Provider: Current PCP: Dr Kellogg ( LOV 4/24/25) ; seen by Dr Bony Castaneda in the past (LOV 10/7/24)   Endo referral placed by PCP and IDS last admission, Scheduled with Mai Figueroa, PAC 8/4/25   Formal Diabetes Education/Educator: none recently          Physical Exam:   /62   Pulse 84   Temp 98  F (36.7  C) (Axillary)   Resp 16   Wt 58.1 kg (128 lb 1.4 oz)   SpO2 99%   BMI 22.70 kg/m    General:  NAD, calm in bed, eyes open to voice & tracking, no responses  Lungs: unlabored breathing on RA.  Psych:   calm           Data:     Lab Results   Component Value Date    A1C 8.8 (H) 03/23/2025    A1C 9.6 (H) 01/16/2025    A1C 11.2 (H) 09/23/2024    A1C 10.2 (H) 05/20/2024    A1C 10.7 (H) 01/29/2024    A1C 7.8 (H) 06/21/2021    A1C 11.7 (H) 09/27/2020     ROUTINE IP LABS (Last four results)  BMP  Recent Labs   Lab 05/18/25  0946 05/18/25  0546 05/18/25  0158 05/17/25  2214 05/17/25  0803 05/17/25  0601 05/15/25  0839 05/15/25  0500 05/14/25  1143 05/14/25  0829 05/13/25  0750 05/13/25  0631   NA  --   --   --   --   --  140  --  142  --  136  --  138   POTASSIUM  --   --   --   --   --  4.7  --  4.1  --  4.4  --  4.1   CHLORIDE  --   --   --   --   --  103  --  108*  --  104  --  106   VERONICA  --   --   --   --   --  8.4*  --  8.6*  --  8.2*  --  8.5*   CO2  --   --   --   --   --  25  --  25  --  22  --  21*   BUN  --   --   --   --   --  26.1*  --  22.7  --  21.9  --  18.5   CR  --   --   --   --   --  1.41*  --  1.40*  --  1.44*  --  1.43*   * 173* 171* 135*   < > 322*   < > 78   < > 379*   < > 437*    < > = values in this interval not displayed.     CBC  Recent Labs   Lab 05/17/25  0601 05/15/25  0459 05/14/25  0829 05/13/25  0631   WBC 5.7 6.3 6.0 5.9   RBC 3.60* 3.91 3.59* 3.65*   HGB 9.5* 10.6* 9.6* 9.8*   HCT 31.1* 34.2* 30.9* 30.6*   MCV 86 88 86 84   MCH 26.4* 27.1 26.7  26.8   MCHC 30.5* 31.0* 31.1* 32.0   RDW 15.3* 15.4* 15.4* 15.4*    206 183 192         Js Monroy MD  Endocrinology Fellow     Please notify inpatient diabetes service if changes are planned to steroids, nutrition, or if procedures are planned requiring prolonged NPO status.Diabetes Management Team job code: [id number]    I, Nataly Long, was present with the fellow who participated in the service and in the documentation of the note.  I have verified the history and personally performed the physical exam and medical decision making.  I agree with the assessment and plan of care as documented in the note.     Nataly Long MD

## 2025-05-18 NOTE — PLAN OF CARE
Goal Outcome Evaluation:    Shift Hours: 1900 - 0700    Assessment:  Body systems that were not at patient's baseline Genitourinary, Musculoskeletal, and Safety. Focused body system assessments documented in flowsheets.        Activity     Fall Risk Score: 95   Bed alarm on? Yes     Activity Assistance Provided: assistance, 2 people      Assistive Device Utilized: lift device    Pain: None per pt    Labs/RN Managed Protocols: BG Q4    Lines/Drains: NJ, PIV    Nutrition: NPO, TF    Goal Outcome Evaluation    Plan of Care Reviewed With: patient  Overall Patient Progress: no change  Outcome Evaluation: Patient rested intermittently overnight. BG stable, TF running appropriately through NJ, flushed periodically to avoid clog. Q2 Repositioned and incontinence cares. Cream applied to bottom. VSS on RA. CPOC.    Barriers to Discharge:     TCU

## 2025-05-18 NOTE — PROGRESS NOTES
[facility]    Medicine Progress Note - Hospitalist Service, GOLD TEAM 6    Date of Admission:  5/7/2025    Assessment & Plan   Isabell Matthews is a 64 year old female with a past medical history of HTN, T1DM, HLD, diabetic neuropathy, orthostatic hypotension, seizures, vascular dementia, prior CVAs, hypothyroidism, recurrent UTIs, and frequent falls. She initially presented to the ED for evaluation of stroke-like symptoms and was admitted for further monitoring and management.     Multifocal Acute Cerebral Infarcts  Hx of Multiple CVAs  Hx of Seizures  Pt initially presented w/ new findings of dysarthria, dysphagia, decreased responsiveness, confusion. However, while in the ED, no new focal neurologic sx were observed. Head CT negative for acute infarct or hemorrhage. CTA w/ multifocal stenosis involving L RADHA (A2) and R MCA which had progressed since 1/16/25. Stroke Neuro team consulted while in the ED, and loaded with Keppra on arrival. EEG negative. ECHO showed LV 50-55%, mnild ventricular hypokinesis, RV normal size, no significant valvular abnormalities. Initially, she was found to have a UTI, so there was concern that perhaps her presentation was d/t recrudescence of prior CVA deficits, however, MRI brain did show acute infarcts in R corona radiata, precentral gyrus, and operculum. Unfortunately, she was not a candidate for advanced therapies. Aside from ongoing dysarthria, no new focal neurologic deficits. Clinically stable this morning, but somewhat more fatigued-appearing.  - Stroke Neuro team reinvolved on 5/15 given increased somnolence. They suspect multifactorial etiology and r/t recurrent CVA, recrudescence of prior CVA symptoms   - Given ongoing dysphagia, has a NGT as below   - Keppra level therapeutic   - After care conference on 5/16, decreased Keppra from 500mg BID --> 250mg BID   - Deescalate neuro checks to every shift given stability  - Given no  definitive plan to obtain enteral access via PEG-J (have access w/ NGT) started Plavix as part of DAPT on 5/15/25 (needs for 90 days from 5/15) - however, a PEG-J seems to be outside the GOC per daughters' decision (but they are discussing GOC between each other more this weekend)  - Palliative Care consulted, greatly appreciate assistance (see GOC below)  - NPO per SLP for now while awaiting improvement in mentation enough to coordinate VFSS (is on TF via NG)  - Atorvastatin 40mg daily  - At discharge, will need ZioPatch x14 days mailed out  - Stroke Neuro follow-up in 8 weeks    Acute Metabolic Encephalopathy, likely multifactorial  Hx of Vascular Dementia  Acute change in status noted on arrival, possibly 2/2 acute CVA vs metabolic encephalopathy. Likely multifactorial with UTI, dehydration, hyperglycemia, hypercapnia, and hospital environment contributing, as well as newfound acute CVA as above. No significant electrolyte abnormalities. No suspicious meds. TSH 0.14.   - Stroke Neurology reinvolved on 5/15/25 as above  - Monitor clinically  - Bedside sitter discontinued since 5/15 and has not required since  - Delirium precautions   - Would recommend bed inclined, lights on, blinds open and TV on during the day and then at night having all these off and bed more supine to encourage proper sleep-wake cycle  - Continue melatonin 1mg at bedtime      UTI, recurrent, resolved  Urine culture from 5/1/25 shows pan-susceptible E.Coli. On arrival here, aebrile, WBC normal. No other obvious source of infection. Repeat UA/UC negative. At this point, pt has completed adequate therapy for uncomplicated UTI. However, course was extended to 7 days d/t encephalopathy.   - Completed 7 days of IV Ceftriaxone on 5/12     Dysphagia (S/P NGT placement 5/10)  Patient failed initial swallow study. Likely in the setting of acute encephalopathy and acute stroke. SLP consulted, recommended strict NPO. NG placed 5/10, and has now begun TFs  without issues. To clarify, prior notes are conflicting and document tube as NJ, but confirmed today that this is an NG tube (prior XR shows tip is coiled in gastric lumen).  - Per SLP evaluation on 5/12, recs to keep NPO for now    - VFSS pending - attempted 5/13, but unable to complete d/t somnolence. Pending this being rescheduled depending on her mentation  - Resumed all enteral meds on 5/13 via NGT  - Now up to goal rate of 45mL/hr for TFs via NGT, appears to be tolerating well    Loose Stools  Several loose stools documented the past few days; no low suspicion of active infection given VSS, afebrile, no leukocytosis. Low suspicion medication-induced as has tolerated all her current meds PTA w/o issues (though solutions can cause loose stools). Most likely culprit is new tube feeds here.  - RD switched formula to high-fiber solution     Hypernatremia, resolved  Free water deficit in setting of dysphagia and NPO status. Resolved with hypotonic fluids.   - Trend lytes daily for now especially as initiating TFs, monitor for refeeding     Possible Stress Cardiomyopathy  ECHO in the ED showed LVEF 50-55% with mid ventricular hypokinesis w/ preserved apical and mid segements c/w stress CM. No obvious signs of heart failure. Clinically, she has no s/sx to suggest acutely decompensated HF.  - Continue to monitor  - Continue Carvedilol w/ hold parameters     Type 1 Diabetes Mellitus  A1c of 8.8% in 3/2025. BG in range of 300-400 this admission. NPO due to dysphagia. Hx of labile sugars and hypoglycemia in setting of infection. Home regimen: Lantus 18 units at bedtime. Endocrine consulted to assist with insulin dosing in setting of tube feeds. Has been hyperglycemic overnight 140s-350s.   - Endocrine following, appreciate assistance   - Adjusted Lantus to 8 units at 2000    - NPH 20 units qAM + 12 units qPM   - Medium sliding scale insulin Q4H for now while NPO   - BG checks Q4H while NPO   - Hypoglycemia protocol   -  PRN D10 at 60mL/hr if TFs are stopped/interrupted     Hypothyroidism  Most recent TSH 0.99. Repeat TSH here 0.14. On admission, daughter noted recent medication adjustments (brand synthroid vs generic) therefore may have been over-replaced. Cannot r/o sick euthyroid state as well.  - Continue PTA PO synthroid 88mcg   - Recheck TSH in 1-2 weeks when clinically stable     Stage IIIb CKD  Baseline Cr of 1.4-1.6. Cr improved to 1.3-1.4 with IVF. Suspect chronic dehydration contributing.  - BMP daily for now  - Avoid nephrotoxic agents as best as possible     HTN  HLD  -180 on admission. Can allow for permissive hypertension in setting of acute stroke per Stroke Neuro.  - Continue PTA Carvedilol w/ hold parameters  - Continue PTA Atorvastatin w/ enteral access via NG  - Transitioned DE ASA to PO ASA 324mg daily as above  - IV hydralazine PRN for SBP>180     Chronic Low Back Pain  - Diclofenac gel PRN  - Continue PTA Duloxetine, Gabapentin as now have enteral access     Seasonal allergies  - Hold PTA loratidine for now to reduce pill burden via NGT    Goals of Care  Palliative Care consulted on 5/15/25 given daughter (Vishal) expressing concern over the direction of the patient's care and what the future looks like for her.   - Greatly appreciate Palliative Care's involvement and spearheading of care conference on 5/16 (with our team, theirs, Stroke Neuro, KINZA, and two daughters [Felton & Vishal]). See their excellent note outlining the details regarding conversation  - In brief:   - Will stay the course w/ restorative cares and but to be DNR/DNI. The daughters need to take time to absorb the information from the care conference and will reconvene with us early next week regarding their decision and path forward. At this time, they would like to begin SW to place referrals to various TCUs in the [facility], while explicitly emphasizing their desire to stay away from LTC. However, they will discuss this more this  weekend and get back to us early next week   - Adjusted Keppra down to 250mg BID (from 500mg BID)   - Keeping all meds otherwise the same for now          Diet: NPO for Medical/Clinical Reasons Except for: No Exceptions  Adult Formula Drip Feeding: Continuous [employer] Standard 1.4; Nasogastric tube; Goal Rate: 45 ( can start at goal ); mL/hr; Initiate at 10ml/hr and advance by 10ml Q8H as tolerated.; Do not advance tube feeding rate unless K+ is = or > 3.0, Mg++...    DVT Prophylaxis: Pneumatic Compression Devices  Parker Catheter: Not present  Lines: None     Cardiac Monitoring: None  Code Status: No CPR- Do NOT Intubate      Clinically Significant Risk Factors               # Hypoalbuminemia: Lowest albumin = 2.9 g/dL at 5/17/2025  6:01 AM, will monitor as appropriate     # Hypertension: Noted on problem list     # Dementia: noted on problem list            # Financial/Environmental Concerns:           Social Drivers of Health    Food Insecurity: Unknown (5/17/2025)    Food Insecurity     Within the past 12 months, did you worry that your food would run out before you got money to buy more?: Patient unable to answer     Within the past 12 months, did the food you bought just not last and you didn t have money to get more?: Patient unable to answer   Depression: At risk (4/2/2025)    PHQ-2     PHQ-2 Score: 4   Housing Stability: Unknown (5/17/2025)    Housing Stability     Do you have housing? : Patient unable to answer     Are you worried about losing your housing?: Patient unable to answer   Tobacco Use: Medium Risk (4/24/2025)    Patient History     Smoking Tobacco Use: Former     Smokeless Tobacco Use: Never   Financial Resource Strain: Unknown (5/17/2025)    Financial Resource Strain     Within the past 12 months, have you or your family members you live with been unable to get utilities (heat, electricity) when it was really needed?: Patient unable to answer   Transportation Needs: Unknown (5/17/2025)     Transportation Needs     Within the past 12 months, has lack of transportation kept you from medical appointments, getting your medicines, non-medical meetings or appointments, work, or from getting things that you need?: Patient unable to answer   Interpersonal Safety: Unknown (5/17/2025)    Interpersonal Safety     Do you feel physically and emotionally safe where you currently live?: Patient unable to answer     Within the past 12 months, have you been hit, slapped, kicked or otherwise physically hurt by someone?: Patient unable to answer     Within the past 12 months, have you been humiliated or emotionally abused in other ways by your partner or ex-partner?: Patient unable to answer          Disposition Plan     Medically Ready for Discharge: Anticipated in 5+ Days           The patient's care was discussed with the Attending Physician, Dr. Lubna Roman, Bedside Nurse, Patient, and Endocrine Consultant(s).    Jimmy Moore PA-C  Hospitalist Service, GOLD TEAM [address] [place] [address]  Securely message with [employer] (more info)  Text page via [facility] Paging/Directory   See signed in provider for up to date coverage information  ______________________________________________________________________    Interval History   Pt seen in her room this AM. She appears more fatigued this AM that yesterday. No observed vomiting, coughing, or complaints of nausea with tube feeds. Cannot get an ROS on her this AM d/t somnolence.    Physical Exam   Vital Signs: Temp: 98.5  F (36.9  C) Temp src: Oral BP: 133/68 Pulse: 86   Resp: 16 SpO2: 95 % O2 Device: None (Room air)    Weight: 128 lbs 1.4 oz    Constitutional: somnolent on exam, but intermittently & briefly rouses to loud voice, but quickly falls back asleep. Appears stated age.   Eyes: lids and lashes normal  ENT: normocephalic, without obvious abnormality. NG present at nare, no observed bleeding.  Respiratory: No increased work of  breathing, good air exchange, clear to auscultation bilaterally, no crackles or wheezing  Cardiovascular: regular rate and rhythm, normal S1 and S2, no S3, no S4, and no murmur noted  GI: normal bowel sounds, soft, non-distended, and non-tender  Skin: no bruising or bleeding, no redness, warmth, or swelling, no rashes, and no lesions on visualized skin.   Musculoskeletal: no lower extremity pitting edema present, no deformities, no pain response to calf palpation.  Neurologic: Moving all extremities equally and spontaneously when stirring to voice.   Neuropsychiatric: General: calm  Level of consciousness: somnolent    Medical Decision Making       50 MINUTES SPENT BY ME on the date of service doing chart review, history, exam, documentation & further activities per the note.      Data         Imaging results reviewed over the past 24 hrs:   No results found for this or any previous visit (from the past 24 hours).    Recent Labs   Lab 05/18/25  0946 05/18/25  0546 05/18/25  0158 05/17/25  0803 05/17/25  0601 05/15/25  0839 05/15/25  0500 05/15/25  0459 05/14/25  1143 05/14/25  0829   WBC  --   --   --   --  5.7  --   --  6.3  --  6.0   HGB  --   --   --   --  9.5*  --   --  10.6*  --  9.6*   MCV  --   --   --   --  86  --   --  88  --  86   PLT  --   --   --   --  194  --   --  206  --  183   NA  --   --   --   --  140  --  142  --   --  136   POTASSIUM  --   --   --   --  4.7  --  4.1  --   --  4.4   CHLORIDE  --   --   --   --  103  --  108*  --   --  104   CO2  --   --   --   --  25  --  25  --   --  22   BUN  --   --   --   --  26.1*  --  22.7  --   --  21.9   CR  --   --   --   --  1.41*  --  1.40*  --   --  1.44*   ANIONGAP  --   --   --   --  12  --  9  --   --  10   VERONICA  --   --   --   --  8.4*  --  8.6*  --   --  8.2*   * 173* 171*   < > 322*   < > 78  --    < > 379*   ALBUMIN  --   --   --   --  2.9*  --  3.1*  --   --  3.0*   PROTTOTAL  --   --   --   --  5.4*  --  5.7*  --   --  5.2*   BILITOTAL   --   --   --   --  0.2  --  0.2  --   --  <0.2   ALKPHOS  --   --   --   --  276*  --  247*  --   --  233*   ALT  --   --   --   --  15  --  13  --   --  15   AST  --   --   --   --  25  --  22  --   --  25    < > = values in this interval not displayed.

## 2025-05-19 ENCOUNTER — APPOINTMENT (OUTPATIENT)
Dept: PHYSICAL THERAPY | Facility: CLINIC | Age: 65
DRG: 064 | End: 2025-05-19
Payer: COMMERCIAL

## 2025-05-19 ENCOUNTER — APPOINTMENT (OUTPATIENT)
Dept: OCCUPATIONAL THERAPY | Facility: CLINIC | Age: 65
DRG: 064 | End: 2025-05-19
Payer: COMMERCIAL

## 2025-05-19 ENCOUNTER — APPOINTMENT (OUTPATIENT)
Dept: GENERAL RADIOLOGY | Facility: CLINIC | Age: 65
End: 2025-05-19
Attending: PHYSICIAN ASSISTANT
Payer: COMMERCIAL

## 2025-05-19 LAB
ALBUMIN SERPL BCG-MCNC: 3 G/DL (ref 3.5–5.2)
ALP SERPL-CCNC: 277 U/L (ref 40–150)
ALT SERPL W P-5'-P-CCNC: 22 U/L (ref 0–50)
ANION GAP SERPL CALCULATED.3IONS-SCNC: 10 MMOL/L (ref 7–15)
AST SERPL W P-5'-P-CCNC: 38 U/L (ref 0–45)
BASOPHILS # BLD AUTO: 0 10E3/UL (ref 0–0.2)
BASOPHILS NFR BLD AUTO: 1 %
BILIRUB SERPL-MCNC: 0.2 MG/DL
BUN SERPL-MCNC: 29.4 MG/DL (ref 8–23)
CALCIUM SERPL-MCNC: 8.6 MG/DL (ref 8.8–10.4)
CHLORIDE SERPL-SCNC: 107 MMOL/L (ref 98–107)
CREAT SERPL-MCNC: 1.5 MG/DL (ref 0.51–0.95)
EGFRCR SERPLBLD CKD-EPI 2021: 38 ML/MIN/1.73M2
EOSINOPHIL # BLD AUTO: 0.2 10E3/UL (ref 0–0.7)
EOSINOPHIL NFR BLD AUTO: 3 %
ERYTHROCYTE [DISTWIDTH] IN BLOOD BY AUTOMATED COUNT: 15.8 % (ref 10–15)
GLUCOSE BLDC GLUCOMTR-MCNC: 113 MG/DL (ref 70–99)
GLUCOSE BLDC GLUCOMTR-MCNC: 133 MG/DL (ref 70–99)
GLUCOSE BLDC GLUCOMTR-MCNC: 133 MG/DL (ref 70–99)
GLUCOSE BLDC GLUCOMTR-MCNC: 167 MG/DL (ref 70–99)
GLUCOSE BLDC GLUCOMTR-MCNC: 169 MG/DL (ref 70–99)
GLUCOSE BLDC GLUCOMTR-MCNC: 176 MG/DL (ref 70–99)
GLUCOSE BLDC GLUCOMTR-MCNC: 215 MG/DL (ref 70–99)
GLUCOSE BLDC GLUCOMTR-MCNC: 288 MG/DL (ref 70–99)
GLUCOSE BLDC GLUCOMTR-MCNC: 37 MG/DL (ref 70–99)
GLUCOSE BLDC GLUCOMTR-MCNC: 40 MG/DL (ref 70–99)
GLUCOSE BLDC GLUCOMTR-MCNC: 47 MG/DL (ref 70–99)
GLUCOSE BLDC GLUCOMTR-MCNC: 80 MG/DL (ref 70–99)
GLUCOSE SERPL-MCNC: 120 MG/DL (ref 70–99)
HCO3 SERPL-SCNC: 24 MMOL/L (ref 22–29)
HCT VFR BLD AUTO: 31.7 % (ref 35–47)
HGB BLD-MCNC: 9.9 G/DL (ref 11.7–15.7)
IMM GRANULOCYTES # BLD: 0 10E3/UL
IMM GRANULOCYTES NFR BLD: 1 %
LYMPHOCYTES # BLD AUTO: 1.6 10E3/UL (ref 0.8–5.3)
LYMPHOCYTES NFR BLD AUTO: 24 %
MCH RBC QN AUTO: 27.1 PG (ref 26.5–33)
MCHC RBC AUTO-ENTMCNC: 31.2 G/DL (ref 31.5–36.5)
MCV RBC AUTO: 87 FL (ref 78–100)
MONOCYTES # BLD AUTO: 0.6 10E3/UL (ref 0–1.3)
MONOCYTES NFR BLD AUTO: 10 %
NEUTROPHILS # BLD AUTO: 4.1 10E3/UL (ref 1.6–8.3)
NEUTROPHILS NFR BLD AUTO: 62 %
NRBC # BLD AUTO: 0 10E3/UL
NRBC BLD AUTO-RTO: 0 /100
PLATELET # BLD AUTO: 220 10E3/UL (ref 150–450)
POTASSIUM SERPL-SCNC: 4.8 MMOL/L (ref 3.4–5.3)
PROT SERPL-MCNC: 5.7 G/DL (ref 6.4–8.3)
RBC # BLD AUTO: 3.65 10E6/UL (ref 3.8–5.2)
SODIUM SERPL-SCNC: 141 MMOL/L (ref 135–145)
WBC # BLD AUTO: 6.5 10E3/UL (ref 4–11)

## 2025-05-19 PROCEDURE — 250N000013 HC RX MED GY IP 250 OP 250 PS 637

## 2025-05-19 PROCEDURE — 250N000011 HC RX IP 250 OP 636

## 2025-05-19 PROCEDURE — 99207 PR APP CREDIT; MD BILLING SHARED VISIT: CPT

## 2025-05-19 PROCEDURE — 99232 SBSQ HOSP IP/OBS MODERATE 35: CPT | Mod: FS | Performed by: STUDENT IN AN ORGANIZED HEALTH CARE EDUCATION/TRAINING PROGRAM

## 2025-05-19 PROCEDURE — 250N000013 HC RX MED GY IP 250 OP 250 PS 637: Performed by: STUDENT IN AN ORGANIZED HEALTH CARE EDUCATION/TRAINING PROGRAM

## 2025-05-19 PROCEDURE — 74018 RADEX ABDOMEN 1 VIEW: CPT | Mod: 26 | Performed by: RADIOLOGY

## 2025-05-19 PROCEDURE — 80053 COMPREHEN METABOLIC PANEL: CPT

## 2025-05-19 PROCEDURE — 120N000002 HC R&B MED SURG/OB UMMC

## 2025-05-19 PROCEDURE — 97530 THERAPEUTIC ACTIVITIES: CPT | Mod: GP | Performed by: PHYSICAL THERAPIST

## 2025-05-19 PROCEDURE — 999N000065 XR ABDOMEN PORT 1 VIEW

## 2025-05-19 PROCEDURE — 97530 THERAPEUTIC ACTIVITIES: CPT | Mod: GO

## 2025-05-19 PROCEDURE — 85004 AUTOMATED DIFF WBC COUNT: CPT

## 2025-05-19 PROCEDURE — 99233 SBSQ HOSP IP/OBS HIGH 50: CPT | Mod: 24 | Performed by: STUDENT IN AN ORGANIZED HEALTH CARE EDUCATION/TRAINING PROGRAM

## 2025-05-19 PROCEDURE — 258N000001 HC RX 258

## 2025-05-19 PROCEDURE — 85014 HEMATOCRIT: CPT

## 2025-05-19 PROCEDURE — 36415 COLL VENOUS BLD VENIPUNCTURE: CPT

## 2025-05-19 PROCEDURE — 250N000009 HC RX 250: Performed by: STUDENT IN AN ORGANIZED HEALTH CARE EDUCATION/TRAINING PROGRAM

## 2025-05-19 RX ORDER — GUAIFENESIN 600 MG/1
15 TABLET, EXTENDED RELEASE ORAL DAILY
Status: DISCONTINUED | OUTPATIENT
Start: 2025-05-19 | End: 2025-05-29

## 2025-05-19 RX ADMIN — CARVEDILOL 6.25 MG: 3.12 TABLET, FILM COATED ORAL at 18:33

## 2025-05-19 RX ADMIN — DICLOFENAC SODIUM 2 G: 10 GEL TOPICAL at 09:02

## 2025-05-19 RX ADMIN — Medication 20 MG: at 09:02

## 2025-05-19 RX ADMIN — HEPARIN SODIUM 5000 UNITS: 5000 INJECTION, SOLUTION INTRAVENOUS; SUBCUTANEOUS at 13:16

## 2025-05-19 RX ADMIN — CLOPIDOGREL BISULFATE 75 MG: 75 TABLET, FILM COATED ORAL at 09:03

## 2025-05-19 RX ADMIN — DEXTROSE MONOHYDRATE 25 ML: 25 INJECTION, SOLUTION INTRAVENOUS at 02:23

## 2025-05-19 RX ADMIN — DICLOFENAC SODIUM 2 G: 10 GEL TOPICAL at 20:48

## 2025-05-19 RX ADMIN — MICONAZOLE NITRATE: 20 CREAM TOPICAL at 20:48

## 2025-05-19 RX ADMIN — DICLOFENAC SODIUM 2 G: 10 GEL TOPICAL at 13:17

## 2025-05-19 RX ADMIN — LEVETIRACETAM 250 MG: 100 SOLUTION ORAL at 20:47

## 2025-05-19 RX ADMIN — HEPARIN SODIUM 5000 UNITS: 5000 INJECTION, SOLUTION INTRAVENOUS; SUBCUTANEOUS at 22:12

## 2025-05-19 RX ADMIN — Medication 1000 MCG: at 09:02

## 2025-05-19 RX ADMIN — ASPIRIN 81 MG CHEWABLE TABLET 324 MG: 81 TABLET CHEWABLE at 09:03

## 2025-05-19 RX ADMIN — LEVETIRACETAM 250 MG: 100 SOLUTION ORAL at 09:02

## 2025-05-19 RX ADMIN — GABAPENTIN 100 MG: 100 CAPSULE ORAL at 22:12

## 2025-05-19 RX ADMIN — ATORVASTATIN CALCIUM 40 MG: 40 TABLET, FILM COATED ORAL at 09:03

## 2025-05-19 RX ADMIN — CARVEDILOL 6.25 MG: 3.12 TABLET, FILM COATED ORAL at 09:03

## 2025-05-19 RX ADMIN — Medication 15 ML: at 15:56

## 2025-05-19 RX ADMIN — MICONAZOLE NITRATE: 20 CREAM TOPICAL at 09:02

## 2025-05-19 RX ADMIN — DICLOFENAC SODIUM 2 G: 10 GEL TOPICAL at 15:56

## 2025-05-19 RX ADMIN — DEXTROSE MONOHYDRATE 25 ML: 25 INJECTION, SOLUTION INTRAVENOUS at 20:27

## 2025-05-19 RX ADMIN — LEVOTHYROXINE SODIUM 88 MCG: 88 TABLET ORAL at 09:02

## 2025-05-19 ASSESSMENT — ACTIVITIES OF DAILY LIVING (ADL)
ADLS_ACUITY_SCORE: 98
ADLS_ACUITY_SCORE: 98
ADLS_ACUITY_SCORE: 89
ADLS_ACUITY_SCORE: 93
ADLS_ACUITY_SCORE: 93
ADLS_ACUITY_SCORE: 98
ADLS_ACUITY_SCORE: 89
ADLS_ACUITY_SCORE: 98
ADLS_ACUITY_SCORE: 98
ADLS_ACUITY_SCORE: 89
ADLS_ACUITY_SCORE: 98
ADLS_ACUITY_SCORE: 93
ADLS_ACUITY_SCORE: 93
ADLS_ACUITY_SCORE: 89
ADLS_ACUITY_SCORE: 98
ADLS_ACUITY_SCORE: 93
ADLS_ACUITY_SCORE: 89

## 2025-05-19 NOTE — PROGRESS NOTES
[facility]    Medicine Progress Note - Hospitalist Service, GOLD TEAM 6    Date of Admission:  5/7/2025    Assessment & Plan   Isabell Matthews is a 64 year old female with a past medical history of HTN, T1DM, HLD, diabetic neuropathy, orthostatic hypotension, seizures, vascular dementia, prior CVAs, hypothyroidism, recurrent UTIs, and frequent falls. She initially presented to the ED for evaluation of stroke-like symptoms and was admitted for further monitoring and management.     Multifocal Acute Cerebral Infarcts  Hx of Multiple CVAs  Hx of Seizures  Pt initially presented w/ new findings of dysarthria, dysphagia, decreased responsiveness, confusion. However, while in the ED, no new focal neurologic sx were observed. Head CT negative for acute infarct or hemorrhage. CTA w/ multifocal stenosis involving L RADHA (A2) and R MCA which had progressed since 1/16/25. Stroke Neuro team consulted while in the ED, and loaded with Keppra on arrival. EEG negative. ECHO showed LV 50-55%, mnild ventricular hypokinesis, RV normal size, no significant valvular abnormalities. Initially, she was found to have a UTI, so there was concern that perhaps her presentation was d/t recrudescence of prior CVA deficits, however, MRI brain did show acute infarcts in R corona radiata, precentral gyrus, and operculum. Unfortunately, she was not a candidate for advanced therapies. Aside from ongoing dysarthria, no new focal neurologic deficits. Clinically stable this morning, continues to appear fatigued.  - Stroke Neuro team reinvolved on 5/15 given increased somnolence. They suspect multifactorial etiology and r/t recurrent CVA, recrudescence of prior CVA symptoms   - Given ongoing dysphagia, has a NGT as below   - Keppra level therapeutic   - After care conference on 5/16, decreased Keppra from 500mg BID --> 250mg BID   - Deescalate neuro checks to every shift given stability  - Given no definitive  plan to obtain enteral access via PEG-J (have access w/ NGT) started Plavix as part of DAPT on 5/15/25 (needs for 90 days from 5/15) - however, a PEG-J seems to be outside the GOC per daughters' decision (but they are discussing GOC between each other more this weekend)  - Palliative Care consulted, greatly appreciate assistance (see GOC below)  - NPO per SLP for now while awaiting improvement in mentation enough to coordinate VFSS (is on TF via NG)   - VFSS ordered today  - Atorvastatin 40mg daily  - At discharge, will need ZioPatch x14 days mailed out  - Stroke Neuro follow-up in 8 weeks    Acute Metabolic Encephalopathy, likely multifactorial  Hx of Vascular Dementia  Acute change in status noted on arrival, possibly 2/2 acute CVA vs metabolic encephalopathy. Likely multifactorial with UTI, dehydration, hyperglycemia, hypercapnia, and hospital environment contributing, as well as newfound acute CVA as above. No significant electrolyte abnormalities. No suspicious meds. TSH 0.14.   - Stroke Neurology reinvolved on 5/15/25 as above  - Monitor clinically  - Bedside sitter discontinued since 5/15 and has not required since  - Delirium precautions   - Would recommend bed inclined, lights on, blinds open and TV on during the day and then at night having all these off and bed more supine to encourage proper sleep-wake cycle  - Continue melatonin 1mg at bedtime      UTI, recurrent, resolved  Urine culture from 5/1/25 shows pan-susceptible E.Coli. On arrival here, aebrile, WBC normal. No other obvious source of infection. Repeat UA/UC negative. At this point, pt has completed adequate therapy for uncomplicated UTI. However, course was extended to 7 days d/t encephalopathy.   - Completed 7 days of IV Ceftriaxone on 5/12     Dysphagia (S/P NGT placement 5/10)  Patient failed initial swallow study. Likely in the setting of acute encephalopathy and acute stroke. SLP consulted, recommended strict NPO. NG placed 5/10, and has  now begun TFs without issues. To clarify, prior notes are conflicting and document tube as NJ, but confirmed that this is an NG tube (prior XR shows tip is coiled in gastric lumen). This was readjusted today as AXR on 5/18 demonstrated possible kink in the tube on my personal review. Thus, was retracted today and post-procedural AXR shows tip projecting over pylorus.  - Per SLP evaluation on 5/12, recs to keep NPO for now    - VFSS pending - attempted 5/13, but unable to complete d/t somnolence. Repeat study ordered today (anticipate 5/20 or 5/21)  - Resumed all enteral meds on 5/13 via NGT  - Now up to goal rate of 45mL/hr for TFs via NGT, appears to be tolerating well    Loose Stools  Several loose stools documented the past few days; no low suspicion of active infection given VSS, afebrile, no leukocytosis. Low suspicion medication-induced as has tolerated all her current meds PTA w/o issues (though solutions can cause loose stools). Most likely culprit is new tube feeds here.  - RD switched formula to high-fiber solution     Hypernatremia, resolved  Free water deficit in setting of dysphagia and NPO status. Resolved with hypotonic fluids.   - Trend lytes daily for now especially as initiating TFs, monitor for refeeding     Possible Stress Cardiomyopathy  ECHO in the ED showed LVEF 50-55% with mid ventricular hypokinesis w/ preserved apical and mid segements c/w stress CM. No obvious signs of heart failure. Clinically, she has no s/sx to suggest acutely decompensated HF.  - Continue to monitor  - Continue Carvedilol w/ hold parameters     Type 1 Diabetes Mellitus  Hypoglycemia  A1c of 8.8% in 3/2025. BG in range of 300-400 this admission. NPO due to dysphagia. Hx of labile sugars and hypoglycemia in setting of infection. Home regimen: Lantus 18 units at bedtime. Endocrine consulted to assist with insulin dosing in setting of tube feeds. Intermittent hypoglycemia, down to 40s overnight. Suspect possibly r/t kink  in tubing (see above), resulting in improper administration of TFs.  - Endocrine following, appreciate assistance   - Adjusted Lantus to 8 units at 2000    - NPH 18 units qAM + 8 units qPM   - Medium sliding scale insulin Q4H for now while NPO   - BG checks Q4H while NPO   - Hypoglycemia protocol   - PRN D10 at 60mL/hr if TFs are stopped/interrupted     Hypothyroidism  Most recent TSH 0.99. Repeat TSH here 0.14. On admission, daughter noted recent medication adjustments (brand synthroid vs generic) therefore may have been over-replaced. Cannot r/o sick euthyroid state as well.  - Continue PTA PO synthroid 88mcg   - Recheck TSH in 1-2 weeks when clinically stable     Stage IIIb CKD  Baseline Cr of 1.4-1.6. Cr improved to 1.3-1.4 with IVF. Suspect chronic dehydration contributing.  - BMP daily for now  - Avoid nephrotoxic agents as best as possible     HTN  HLD  -180 on admission. Can allow for permissive hypertension in setting of acute stroke per Stroke Neuro.  - Continue PTA Carvedilol w/ hold parameters  - Continue PTA Atorvastatin w/ enteral access via NG  - Transitioned OH ASA to PO ASA 324mg daily as above  - IV hydralazine PRN for SBP>180     Chronic Low Back Pain  - Diclofenac gel PRN  - Continue PTA Duloxetine, Gabapentin as now have enteral access     Seasonal allergies  - Hold PTA loratidine for now to reduce pill burden via NGT    Closed Colles' Fracture of L Wrist  Occurred in April, saw Ortho 4/22/25, but not deemed a surgical candidate d/t her medical comorbidities.  Per Ortho, they wanted her in brace and to be NWB status at L wrist.   - Reached out to Ortho today pending their recs since pt was supposed to have OP follow-up but missed d/t being hospitalized  - Keep NWB status at L wrist until Ortho can weigh in  - Keep brace applied at L wrist until Ortho can weigh in    Goals of Care  Palliative Care consulted on 5/15/25 given daughter (Vishal) expressing concern over the direction of the  patient's care and what the future looks like for her.   - Greatly appreciate Palliative Care's involvement and spearheading of care conference on 5/16 (with our team, theirs, Stroke Neuro, KINZA, and two daughters [Felton & Vishal]). See their excellent note outlining the details regarding conversation  - In brief:   - Will stay the course w/ restorative cares and but to be DNR/DNI. The daughters need to take time to absorb the information from the care conference and will reconvene with us early next week regarding their decision and path forward. At this time, they would like to begin SW to place referrals to various TCUs in the [facility], while explicitly emphasizing their desire to stay away from LTC. However, they will discuss this more this weekend and get back to us early next week   - Adjusted Keppra down to 250mg BID (from 500mg BID)   - Keeping all meds otherwise the same for now          Diet: NPO for Medical/Clinical Reasons Except for: No Exceptions  Adult Formula Drip Feeding: Continuous SlideMail Standard 1.4; Nasogastric tube; Goal Rate: 45 ( can start at goal ); mL/hr; Initiate at 10ml/hr and advance by 10ml Q8H as tolerated.; Do not advance tube feeding rate unless K+ is = or > 3.0, Mg++...    DVT Prophylaxis: Pneumatic Compression Devices  Parker Catheter: Not present  Lines: None     Cardiac Monitoring: None  Code Status: No CPR- Do NOT Intubate      Clinically Significant Risk Factors               # Hypoalbuminemia: Lowest albumin = 2.9 g/dL at 5/17/2025  6:01 AM, will monitor as appropriate     # Hypertension: Noted on problem list     # Dementia: noted on problem list         # Severe Malnutrition: based on nutrition assessment and treatment provided per dietitian's recommendations.    # Financial/Environmental Concerns:           Social Drivers of Health    Food Insecurity: Unknown (5/17/2025)    Food Insecurity     Within the past 12 months, did you worry that your food would run out  before you got money to buy more?: Patient unable to answer     Within the past 12 months, did the food you bought just not last and you didn t have money to get more?: Patient unable to answer   Depression: At risk (4/2/2025)    PHQ-2     PHQ-2 Score: 4   Housing Stability: Unknown (5/17/2025)    Housing Stability     Do you have housing? : Patient unable to answer     Are you worried about losing your housing?: Patient unable to answer   Tobacco Use: Medium Risk (4/24/2025)    Patient History     Smoking Tobacco Use: Former     Smokeless Tobacco Use: Never   Financial Resource Strain: Unknown (5/17/2025)    Financial Resource Strain     Within the past 12 months, have you or your family members you live with been unable to get utilities (heat, electricity) when it was really needed?: Patient unable to answer   Transportation Needs: Unknown (5/17/2025)    Transportation Needs     Within the past 12 months, has lack of transportation kept you from medical appointments, getting your medicines, non-medical meetings or appointments, work, or from getting things that you need?: Patient unable to answer   Interpersonal Safety: Unknown (5/17/2025)    Interpersonal Safety     Do you feel physically and emotionally safe where you currently live?: Patient unable to answer     Within the past 12 months, have you been hit, slapped, kicked or otherwise physically hurt by someone?: Patient unable to answer     Within the past 12 months, have you been humiliated or emotionally abused in other ways by your partner or ex-partner?: Patient unable to answer          Disposition Plan     Medically Ready for Discharge: Anticipated in 5+ Days           The patient's care was discussed with the Attending Physician, Dr. Lubna Roman, Bedside Nurse, Patient, and Endocrine Consultant(s).    Jimmy Moore PA-C  Hospitalist Service, GOLD TEAM [address] [place] [address]  Securely message with Vocera (more  info)  Text page via [facility] Paging/Directory   See signed in provider for up to date coverage information  ______________________________________________________________________    Interval History   Pt seen in her room this AM. She appears more fatigued this AM that yesterday. No observed vomiting, coughing, or complaints of nausea with tube feeds. Cannot get an ROS on her this AM d/t somnolence.    Physical Exam   Vital Signs: Temp: 98.1  F (36.7  C) Temp src: Oral BP: 133/63 Pulse: 83   Resp: 16 SpO2: 97 % O2 Device: None (Room air)    Weight: 124 lbs 9.6 oz    Constitutional: somnolent on exam, but intermittently & briefly rouses to loud voice, but quickly falls back asleep. Appears stated age.   Eyes: lids and lashes normal  ENT: normocephalic, without obvious abnormality. NG present at nare, no observed bleeding.  Respiratory: No increased work of breathing, good air exchange, clear to auscultation bilaterally, no crackles or wheezing  Cardiovascular: regular rate and rhythm, normal S1 and S2, no S3, no S4, and no murmur noted  GI: normal bowel sounds, soft, non-distended, and non-tender  Skin: no bruising or bleeding, no redness, warmth, or swelling, no rashes, and no lesions on visualized skin.   Musculoskeletal: no lower extremity pitting edema present, no deformities, no pain response to calf palpation.  Neurologic: Moving all extremities equally and spontaneously when stirring to voice.   Neuropsychiatric: General: calm  Level of consciousness: somnolent    Medical Decision Making       50 MINUTES SPENT BY ME on the date of service doing chart review, history, exam, documentation & further activities per the note.      Data     I have personally reviewed the following data over the past 24 hrs:    6.5  \   9.9 (L)   / 220     141 107 29.4 (H) /  288 (H)   4.8 24 1.50 (H) \     ALT: 22 AST: 38 AP: 277 (H) TBILI: 0.2   ALB: 3.0 (L) TOT PROTEIN: 5.7 (L) LIPASE: N/A       Imaging results reviewed over the past  24 hrs:   Recent Results (from the past 24 hours)   XR Abdomen Port 1 View    Narrative    EXAMINATION:  XR ABDOMEN PORT 1 VIEW 5/19/2025 1:19 PM.    COMPARISON: Radiograph 5/18/2025, CT 1/16/2025    HISTORY:  feeding tube placement    FINDINGS:   Feeding tube tip projects over the region of the pylorus. Nonspecific  bowel gas pattern. No definite pneumatosis or portal venous gas. No  acute osseous abnormality. No focal airspace opacities in the included  lungs.       Impression    IMPRESSION:   Feeding tube tip projects over the region of the pylorus.     I have personally reviewed the examination and initial interpretation  and I agree with the findings.    ASNDY BRYANT MD         SYSTEM ID:  [id number]       Recent Labs   Lab 05/19/25  1309 05/19/25  0620 05/19/25  0541 05/17/25  0803 05/17/25  0601 05/15/25  0839 05/15/25  0500 05/15/25  0459   WBC  --  6.5  --   --  5.7  --   --  6.3   HGB  --  9.9*  --   --  9.5*  --   --  10.6*   MCV  --  87  --   --  86  --   --  88   PLT  --  220  --   --  194  --   --  206   NA  --  141  --   --  140  --  142  --    POTASSIUM  --  4.8  --   --  4.7  --  4.1  --    CHLORIDE  --  107  --   --  103  --  108*  --    CO2  --  24  --   --  25  --  25  --    BUN  --  29.4*  --   --  26.1*  --  22.7  --    CR  --  1.50*  --   --  1.41*  --  1.40*  --    ANIONGAP  --  10  --   --  12  --  9  --    VERONICA  --  8.6*  --   --  8.4*  --  8.6*  --    * 120* 133*   < > 322*   < > 78  --    ALBUMIN  --  3.0*  --   --  2.9*  --  3.1*  --    PROTTOTAL  --  5.7*  --   --  5.4*  --  5.7*  --    BILITOTAL  --  0.2  --   --  0.2  --  0.2  --    ALKPHOS  --  277*  --   --  276*  --  247*  --    ALT  --  22  --   --  15  --  13  --    AST  --  38  --   --  25  --  22  --     < > = values in this interval not displayed.

## 2025-05-19 NOTE — PROGRESS NOTES
CLINICAL NUTRITION SERVICES - REASSESSMENT NOTE     RECOMMENDATIONS FOR MDs/PROVIDERS TO ORDER:  No changes to TF support today     Registered Dietitian Interventions:  Free water flushes: updated to 140 ml every 4 hours   -> TF + FWF -> 768 + 840-> 1600 ml/day ( meets hydration needs with current TF support)    Ordered certavite MVI with FT 15 ml daily    Future/Additional Recommendations:  Ability to begin Bolus TFs per NG tube vs. Ability to pass swallow test ( will wait)         INFORMATION OBTAINED  Assessed patient in room.    Chart reviewed:  Past medical history of HTN, T1DM, diabetic neuropathy, CKD3, Orthostatic hypotension, seizures, vascular dementia, prior CVAs, hypothyroidism, recurrent UTIs, and frequent falls.   - Presented to the ED for evaluation of stroke-like symptoms    DM1: Endocrine assisting with insulin dosing   Lantus to 8 units at 2000     NPH 20 units qAM + 12 units qPM  Medium sliding scale insulin Q4H for now while NPO  PRN D10 at 60mL/hr if TFs are stopped/interrupted        CURRENT NUTRITION ORDERS  Diet:   Strict NPO  NPO per SLP for now while awaiting improvement in mentation enough to coordinate VFSS (on TF via NG)       Nutrition Support:   Access: NG tube, bridled  Dosing wt: 56 kg admit wt     - EN: [email or web address] Standard 1.4 (or equivalent) @ 45 mL/hr (1800 mL) provides 1512 kcal (27 kcal/kg), 67 g pro (1.2 g/kg), 170 g CHO, 768 mL free water, and 17 g soluble fiber daily.     - Free water flushes: 90 ml every 3 hrs ( had free water deficit in setting of dysphagia and NPO status.)   -> TF + FWF -> 768 + 720 -> 1488 ml/day     HOB > 30 degrees           CURRENT INTAKE/TOLERANCE  Met with patient today, patient nods head Yes/No and provides one word answers at time.     Per SLP evaluation on 5/12, recs to keep strict NPO due to Dysphagia likely in the setting of acute encephalopathy and acute stroke. VFSS pending - attempted 5/13, but unable to complete d/t somnolence. VFSS to be  rescheduled based on mentation.     NG tube placed 5/10, and  TF is at goal rate of 45 mL/hr for TFs via NGT, appears to be tolerating well.  All meds via NG tube. Per MD note, (prior XR shows tip is coiled in gastric lumen).      NEW FINDINGS  GI symptoms:   Diarrhea, switched TF from Osmolite to high soluble fiber formula to help bind stool   Currently having 1-2 stool since TF changed over the weekend     Skin/wounds: Reviewed        Nutrition-relevant labs:   BUN: 29.4, Cr: 1.50, GFR: 38  K+: 4.8, Mg++/Phos: N/A  B, A1C: 8.8 (DM1)      Nutrition-relevant medications:   B12 sublingual 1000 mcg  Insulin      Weight:   55.2 kg (121 lb 11.2 oz) on;y standing scale early on admit on   Current wt: 58.1 kg (128 lb 1.4 oz) bed scale on       MALNUTRITION  % Intake: Decreased intake does not meet criteria, On TFs  % Weight Loss: Weight loss does not meet criteria   Subcutaneous Fat Loss: Buccal: Moderate and Triceps: Moderate  Muscle Loss: Temples (temporalis muscle): Mild and Clavicles (pectoralis and deltoids): Moderate  Fluid Accumulation/Edema: Mild, 1+  Malnutrition Diagnosis: Severe malnutrition in the context of acute illness or injury  Malnutrition Present on Admission: Unable to assess    EVALUATION OF THE PROGRESS TOWARD GOALS   Previous Goals  Total avg nutritional intake to meet a minimum of 25 kcal/kg and 0.8 g PRO/kg daily (per dosing wt 56 kg).   Evaluation: Progressing    Previous Nutrition Diagnosis  Predicted inadequate nutrient intake (kcals/protein) related to anticipated poor appetite as evidenced by chart review   Evaluation: No longer applicable, nutrition diagnosis changed below    NUTRITION DIAGNOSIS  Inadequate oral intake related to dysphagia in the context of AMS/stroke as evidenced by reliant on Tf support via NG tube to meet hydration / nutrition needs     INTERVENTIONS  Enteral nutrition management  Management of flushing of feeding tube    GOALS  Total avg nutritional intake  to meet a minimum of 25 kcal/kg and 1.0 g PRO/kg daily (per dosing wt 56 kg).     MONITORING/EVALUATION  Progress toward goals will be monitored and evaluated per policy.    Gloria Cotter RD/LD  Unit 7C ([id number]) and ([id number]),  Monday-Friday: Vocera -> (7C clinical Dietitian),   Weekend/Holiday: Vocera -> (Weekend Clinical Dietitian)

## 2025-05-19 NOTE — PROGRESS NOTES
Inpatient Diabetes Management Service: Daily Progress Note     HPI: Isabell Matthews is a 64 year old female with history of HTN, HLD, DM type I, diabetic neuropathy, CKD3b, orthostatic hypotension, seizures, vascular dementia, hypothyroidism, recurrent UTI's, and freuqent falls who was admitted to [facility] with concern for stroke after presenting with acute neurologic symptoms, MRI with acute infarcts within R corona radiata, precentral gyrus and operculum, though presenting symptoms do not completely explain focal deficits and not a candidate for advanced therapies. Hospital course complicated by dysphagia and now NPO per SLP with plan to start tube feedings.     IDS consulted to assist with glycemic management as well as optimization while inpatient and when applicable, recommendations for transition home.         Assessment/Plan:     Assessment:  Type 1 Diabetes Mellitus c/b peripheral neuropathy, nephropathy, hx of DKA and hypoglycemia. Sub-optimal control. (A1c 8.8 % 3/23/25, Hgb: 10.5)  Vascular dementia with acute neurologic changes and new dysphagia  Acute hyperglycemia 2/2 Tube feedings  Labile BG   BMI: 22    Plan/Recommendations:  ** patient has T1DM - requires insulin, basal dose must not to be withheld entirely OR for prolonged periods, high risk for DKA. If trending >250 mg/dL longer than 4 hours, please draw appropriate labs to determine if DKA and treat accordingly **   - Lantus 8 units q 24 hrs at 2000  - decrease NPH 20 --> 18 unit(s) at 1300 (given late today) with tube feeding. Hold if TF held.  - decrease NPH 12 --> 8 unit(s) at 2300 with tube feeding. Hold if TF held.   - Novolog Correction Scale: high resistance (ISF: 25)  every 4 hours   - BG checks q4h    - PRN D10 at 50 ml/hr if tube feeds are stopped/interrupted (dosed [employer] Standard 1.4 @ goal 45 mL/hr) dosing provides 75% of CHO that would have been given by TF   - Hypoglycemia protocol    - Carb counting protocol      Per ADA guidelines, treatment goals and recommendations for older adults with DM and medically complexity is less stringent BG control/A1c for safety - targets of 110-180 mg/dL and up to 250 mg/dL reasonable - albeit not persistent for those with T1DM. Avoid reliance on A1c. Glucose decisions should be based on avoidance of hypoglycemia and symptomatic hyperglycemia.     Discussion: TF formula changed 5/17 d/t diarrhea. Remains NPO on continuous TF. Significant hypoglycemia overnight, per RN reports TF was on and running all night, unclear cause of significant hypoglycemia. Primary team looking into possible kink in tube. TF on this morning, AM NPH delayed d/t possibility of TF being pulled.     Spoke with RD, may go to bolus TF in the future but not today or tomorrow.     Discharge Planning: (tentative) --> likely TCU at discharge   Medications: TBD  - adjustment of PTA dosing with addition of NPH to cover tube feeds as indicated   Needs set doses for day program, would be able to use ICR at home.   Test Claims:  none needed.   Education:  Needs to be assessed closer to discharge.    Outpatient Follow-up: PCP- Dr Kellogg ( LOV 4/24/25), until able to establish with [facility]- has appointment with Mai Figueroa 8/4/25    Please notify Inpatient Diabetes Service if changes are planned to steroids, nutrition, TPN/TF and anticipated procedures requiring prolonged NPO status.         Interval History/Review of Systems :   - The last 24 hours progress and nursing notes reviewed.  - Limited ROS, patient sleeping and not opening eyes to voice today.   - Tolerating TF without n/v.     Planned Procedures/Surgeries: none    Inpatient Glucose Control:       Recent Labs   Lab 05/19/25  0620 05/19/25  0541 05/19/25  0254 05/19/25  0236 05/19/25  0219 05/19/25  0212   * 133* 113* 169* 47* 40*           Medications Impacting Glycemia:   Steroids: none  D5W containing solutions/medications: none   Other  medications impacting glucose: none        Nutrition:   Orders Placed This Encounter      NPO for Medical/Clinical Reasons Except for: No Exceptions  Supplements:  none  TPN: none   TF:   5/10: Osmolite 1.5 (or equivalent) at 10 ml.hr to advance by 10ml every 8 hours (goal 40 ml/hr provides Osmolite 1.5 (or equivalent). Goal: 40 ml/hr provides 8.16 g per hour or 195 CHO in 24 hours)   5/13 - 5/17: been at Osmolite 1.5 goal of 40 ml/hr since 5/13, provides 195 g cho in 24hrs  5/17 - current: changed to continuous [employer] Standard 1.4 @ goal 45 mL/hr provides 170 g cho in 24 hrs         Diabetes History: see full consult note for complete diabetes history   Diabetes Type and Duration: DM for > 40 years, diagnosed in her 40s. Per daughter,  initially told she has T2DM then T1DM for a while then flipped back to being told T2DM.      6/17/2024 positive GAD65 antibody and c-peptide <0.1 but BG elevated     PTA Medication Regimen:   From discharge 3/30/25:- daughter not present to confirm   - Lantus 18 units once daily at HS  - Lispro ICR 1:15 TID AC, 1:20 PRN with snacks/supplements  - Lispro correction 1:50>150 TID AC, >200 HS     At discharge had discussed challenges around glucose lability and dosing: Lantus dose of 18 units may be too high if patient is not eating. However, daughter notes that with more dramatic changes patient will sometimes rise to 400s and has been in DKA before which is hard to balance. If she is concerned about mom going low at night, will try to give her a snack to support Lantus dosing. She is working to resume CGM to more closely monitor blood sugars.  Missing doses?: unknown, daughter not present to confirm   Historical Diabetes Medications: various insulins and dosing      Glucose monitoring device/frequency/trends: Historically has used glucometer before meals and at bedtime. Has tried and failed multiple CGMs (issues with accuracy or patient picking CGM off).   Current trends unknown as  daughter not present to discuss- historically known to be labile with needs dropping dramatically when NPO    Hypoglycemia PTA:   - Frequency: currently unknown, but historically labile.   - Severity: + history of severe unconscious lows   - Awareness: variable, not intact    - Treatment: candy, juice       Outpatient Diabetes Provider: Current PCP: Dr Kellogg ( LOV 4/24/25) ; seen by Dr Bony Castaneda in the past (LOV 10/7/24)   Endo referral placed by PCP and IDS last admission, Scheduled with Mai Figueroa PAC 8/4/25   Formal Diabetes Education/Educator: none recently          Physical Exam:   /46 (BP Location: Right arm)   Pulse 82   Temp 98.1  F (36.7  C) (Oral)   Resp 16   Wt 58.1 kg (128 lb 1.4 oz)   SpO2 94%   BMI 22.70 kg/m    General:  well appearing, NAD, resting comfortably in bed with eyes closed. No opening eyes to voice today. TF on at goal.  Lungs: unlabored breathing on RA.  Psych:   calm         Data:     Lab Results   Component Value Date    A1C 8.8 (H) 03/23/2025    A1C 9.6 (H) 01/16/2025    A1C 11.2 (H) 09/23/2024    A1C 10.2 (H) 05/20/2024    A1C 10.7 (H) 01/29/2024    A1C 7.8 (H) 06/21/2021    A1C 11.7 (H) 09/27/2020     ROUTINE IP LABS (Last four results)  BMP  Recent Labs   Lab 05/19/25  0620 05/19/25  0541 05/19/25  0254 05/19/25  0236 05/17/25  0803 05/17/25  0601 05/15/25  0839 05/15/25  0500 05/14/25  1143 05/14/25  0829     --   --   --   --  140  --  142  --  136   POTASSIUM 4.8  --   --   --   --  4.7  --  4.1  --  4.4   CHLORIDE 107  --   --   --   --  103  --  108*  --  104   VERONICA 8.6*  --   --   --   --  8.4*  --  8.6*  --  8.2*   CO2 24  --   --   --   --  25  --  25  --  22   BUN 29.4*  --   --   --   --  26.1*  --  22.7  --  21.9   CR 1.50*  --   --   --   --  1.41*  --  1.40*  --  1.44*   * 133* 113* 169*   < > 322*   < > 78   < > 379*    < > = values in this interval not displayed.     CBC  Recent Labs   Lab 05/19/25  0620 05/17/25  0601 05/15/25  0459  05/14/25  0829   WBC 6.5 5.7 6.3 6.0   RBC 3.65* 3.60* 3.91 3.59*   HGB 9.9* 9.5* 10.6* 9.6*   HCT 31.7* 31.1* 34.2* 30.9*   MCV 87 86 88 86   MCH 27.1 26.4* 27.1 26.7   MCHC 31.2* 30.5* 31.0* 31.1*   RDW 15.8* 15.3* 15.4* 15.4*    [phone]       Inpatient Diabetes Service will continue to follow, please don't hesitate to contact the team with any questions or concerns.     Karrie Flood PA-C  Inpatient Diabetes Service  Pager: [phone]  Available on vocera    Plan discussed with patient, bedside RN, and primary team via this note.    To contact Inpatient Diabetes Service:     7 AM - 5 PM: Page the IDS BULMARO following the patient that day (see filed or incomplete progress notes/consult notes under Endocrinology)    OR if uncertain of provider assignment: page job code [phone]    5 PM - 7 AM: First call after hours is to primary service.    For urgent after-hours questions, page job code for on call fellow: [phone]     I spent a total of 50 minutes on the date of the encounter doing prep/post-work, chart review, history and exam, documentation and further activities per the note including lab review, multidisciplinary communication, counseling the patient and/or coordinating care regarding acute hyper/hypoglycemic management, as well as discharge management and planning/communication.

## 2025-05-19 NOTE — PLAN OF CARE
Goal Outcome Evaluation:    Shift Hours: 1900 - 0700    Assessment:  Body systems that were not at patient's baseline Cognitive/Behavioral/Neuro, Musculoskeletal, and Safety. Focused body system assessments documented in flowsheets.        Activity     Fall Risk Score: 95   Bed alarm on? Yes     Activity Assistance Provided: assistance, 2 people      Assistive Device Utilized: walker, gait belt    Pain: None per Pt    Labs/RN Managed Protocols: BG Q4    Lines/Drains: NJ britled, PIV SL    Nutrition: NPO/TF    Goal Outcome Evaluation    Plan of Care Reviewed With: patient  Overall Patient Progress: no change  Outcome Evaluation: Patient rested intermittently. TF continued running at 45/continuous. Tube flushed to ensure patency. Shakes head Yes/No and provided one word answers at time. Oriented by baseline per family. Rolls and turns well in bed, utilized pillows for repositioning Q2. VSS on RA. NJ britled. Strict NPO. Bed alarm on. Incontinent, barrier cream and scheduled cream applied. BG monitored and corrected as needed per SS Q4. Low glucose: 40, recheck 47, 25mL D50 given with effect. Rechecks 169 and 113. CPOC.    Barriers to Discharge:     TCU

## 2025-05-19 NOTE — PLAN OF CARE
8543-2025. Alert. Answering yes and no questions with a nod. VSS on RA. Denied pain. Ax2 w/ walker and GB. Up in recliner. NPO. NG TF @ 45 ml/hr. Kink found on AXR yesterday. Flyer came to bedside to trouble shoot. AXR today showing more of a loop vs. kink after adjustments. No BM. Sacrum and perineum less reddened today. Plan for video swallow study w/ SLP. Awaiting TCU placement.     Goal Outcome Evaluation:      Plan of Care Reviewed With: patient    Overall Patient Progress: no change

## 2025-05-20 ENCOUNTER — APPOINTMENT (OUTPATIENT)
Dept: GENERAL RADIOLOGY | Facility: CLINIC | Age: 65
End: 2025-05-20
Payer: COMMERCIAL

## 2025-05-20 ENCOUNTER — APPOINTMENT (OUTPATIENT)
Dept: OCCUPATIONAL THERAPY | Facility: CLINIC | Age: 65
DRG: 064 | End: 2025-05-20
Payer: COMMERCIAL

## 2025-05-20 LAB
GLUCOSE BLDC GLUCOMTR-MCNC: 106 MG/DL (ref 70–99)
GLUCOSE BLDC GLUCOMTR-MCNC: 108 MG/DL (ref 70–99)
GLUCOSE BLDC GLUCOMTR-MCNC: 121 MG/DL (ref 70–99)
GLUCOSE BLDC GLUCOMTR-MCNC: 129 MG/DL (ref 70–99)
GLUCOSE BLDC GLUCOMTR-MCNC: 152 MG/DL (ref 70–99)
GLUCOSE BLDC GLUCOMTR-MCNC: 163 MG/DL (ref 70–99)
GLUCOSE BLDC GLUCOMTR-MCNC: 184 MG/DL (ref 70–99)
GLUCOSE BLDC GLUCOMTR-MCNC: 186 MG/DL (ref 70–99)
GLUCOSE BLDC GLUCOMTR-MCNC: 214 MG/DL (ref 70–99)
GLUCOSE BLDC GLUCOMTR-MCNC: 227 MG/DL (ref 70–99)
GLUCOSE BLDC GLUCOMTR-MCNC: 69 MG/DL (ref 70–99)

## 2025-05-20 PROCEDURE — 120N000002 HC R&B MED SURG/OB UMMC

## 2025-05-20 PROCEDURE — 99207 PR APP CREDIT; MD BILLING SHARED VISIT: CPT | Performed by: PHYSICIAN ASSISTANT

## 2025-05-20 PROCEDURE — 258N000001 HC RX 258

## 2025-05-20 PROCEDURE — 250N000013 HC RX MED GY IP 250 OP 250 PS 637

## 2025-05-20 PROCEDURE — 73110 X-RAY EXAM OF WRIST: CPT | Mod: LT

## 2025-05-20 PROCEDURE — 73110 X-RAY EXAM OF WRIST: CPT | Mod: 26 | Performed by: RADIOLOGY

## 2025-05-20 PROCEDURE — 250N000011 HC RX IP 250 OP 636

## 2025-05-20 PROCEDURE — 250N000013 HC RX MED GY IP 250 OP 250 PS 637: Performed by: STUDENT IN AN ORGANIZED HEALTH CARE EDUCATION/TRAINING PROGRAM

## 2025-05-20 PROCEDURE — 97530 THERAPEUTIC ACTIVITIES: CPT | Mod: GO

## 2025-05-20 PROCEDURE — 99232 SBSQ HOSP IP/OBS MODERATE 35: CPT | Mod: 24 | Performed by: STUDENT IN AN ORGANIZED HEALTH CARE EDUCATION/TRAINING PROGRAM

## 2025-05-20 PROCEDURE — 250N000009 HC RX 250: Performed by: STUDENT IN AN ORGANIZED HEALTH CARE EDUCATION/TRAINING PROGRAM

## 2025-05-20 PROCEDURE — 99232 SBSQ HOSP IP/OBS MODERATE 35: CPT | Mod: FS | Performed by: INTERNAL MEDICINE

## 2025-05-20 RX ADMIN — MICONAZOLE NITRATE: 20 CREAM TOPICAL at 08:21

## 2025-05-20 RX ADMIN — Medication 15 ML: at 08:21

## 2025-05-20 RX ADMIN — Medication 1 MG: at 21:00

## 2025-05-20 RX ADMIN — HEPARIN SODIUM 5000 UNITS: 5000 INJECTION, SOLUTION INTRAVENOUS; SUBCUTANEOUS at 14:18

## 2025-05-20 RX ADMIN — GABAPENTIN 100 MG: 100 CAPSULE ORAL at 21:00

## 2025-05-20 RX ADMIN — LEVETIRACETAM 250 MG: 100 SOLUTION ORAL at 08:21

## 2025-05-20 RX ADMIN — CARVEDILOL 6.25 MG: 3.12 TABLET, FILM COATED ORAL at 18:26

## 2025-05-20 RX ADMIN — LEVOTHYROXINE SODIUM 88 MCG: 88 TABLET ORAL at 08:21

## 2025-05-20 RX ADMIN — ATORVASTATIN CALCIUM 40 MG: 40 TABLET, FILM COATED ORAL at 08:21

## 2025-05-20 RX ADMIN — LEVETIRACETAM 250 MG: 100 SOLUTION ORAL at 20:59

## 2025-05-20 RX ADMIN — ASPIRIN 81 MG CHEWABLE TABLET 324 MG: 81 TABLET CHEWABLE at 08:21

## 2025-05-20 RX ADMIN — DICLOFENAC SODIUM 2 G: 10 GEL TOPICAL at 18:26

## 2025-05-20 RX ADMIN — INSULIN ASPART 2 UNITS: 100 INJECTION, SOLUTION INTRAVENOUS; SUBCUTANEOUS at 20:03

## 2025-05-20 RX ADMIN — HEPARIN SODIUM 5000 UNITS: 5000 INJECTION, SOLUTION INTRAVENOUS; SUBCUTANEOUS at 06:03

## 2025-05-20 RX ADMIN — DICLOFENAC SODIUM 2 G: 10 GEL TOPICAL at 20:03

## 2025-05-20 RX ADMIN — HEPARIN SODIUM 5000 UNITS: 5000 INJECTION, SOLUTION INTRAVENOUS; SUBCUTANEOUS at 21:00

## 2025-05-20 RX ADMIN — CARVEDILOL 6.25 MG: 3.12 TABLET, FILM COATED ORAL at 08:21

## 2025-05-20 RX ADMIN — CLOPIDOGREL BISULFATE 75 MG: 75 TABLET, FILM COATED ORAL at 08:21

## 2025-05-20 RX ADMIN — INSULIN ASPART 1 UNITS: 100 INJECTION, SOLUTION INTRAVENOUS; SUBCUTANEOUS at 12:54

## 2025-05-20 RX ADMIN — DEXTROSE MONOHYDRATE 25 ML: 25 INJECTION, SOLUTION INTRAVENOUS at 01:40

## 2025-05-20 RX ADMIN — DICLOFENAC SODIUM 2 G: 10 GEL TOPICAL at 08:21

## 2025-05-20 RX ADMIN — Medication 1000 MCG: at 08:20

## 2025-05-20 RX ADMIN — Medication 20 MG: at 08:20

## 2025-05-20 ASSESSMENT — ACTIVITIES OF DAILY LIVING (ADL)
ADLS_ACUITY_SCORE: 93
ADLS_ACUITY_SCORE: 93
ADLS_ACUITY_SCORE: 102
ADLS_ACUITY_SCORE: 94
ADLS_ACUITY_SCORE: 88
ADLS_ACUITY_SCORE: 93
ADLS_ACUITY_SCORE: 93
ADLS_ACUITY_SCORE: 88
ADLS_ACUITY_SCORE: 102
ADLS_ACUITY_SCORE: 93
ADLS_ACUITY_SCORE: 88
ADLS_ACUITY_SCORE: 93
ADLS_ACUITY_SCORE: 88
ADLS_ACUITY_SCORE: 93
ADLS_ACUITY_SCORE: 102
ADLS_ACUITY_SCORE: 93
ADLS_ACUITY_SCORE: 88
ADLS_ACUITY_SCORE: 93
ADLS_ACUITY_SCORE: 102

## 2025-05-20 NOTE — PROGRESS NOTES
Inpatient Diabetes Management Service: Daily Progress Note     HPI: Isabell Matthews is a 64 year old female with history of HTN, HLD, DM type I, diabetic neuropathy, CKD3b, orthostatic hypotension, seizures, vascular dementia, hypothyroidism, recurrent UTI's, and freuqent falls who was admitted to [facility] with concern for stroke after presenting with acute neurologic symptoms, MRI with acute infarcts within R corona radiata, precentral gyrus and operculum, though presenting symptoms do not completely explain focal deficits and not a candidate for advanced therapies. Hospital course complicated by dysphagia and now NPO per SLP with plan to start tube feedings.     IDS consulted to assist with glycemic management as well as optimization while inpatient and when applicable, recommendations for transition home.         Assessment/Plan:     Assessment:  Type 1 Diabetes Mellitus c/b peripheral neuropathy, nephropathy, hx of DKA and hypoglycemia. Sub-optimal control. (A1c 8.8 % 3/23/25, Hgb: 10.5)  Vascular dementia with acute neurologic changes and new dysphagia  Acute hyperglycemia 2/2 Tube feedings  Labile BG   BMI: 22    Plan/Recommendations:  ** patient has T1DM - requires insulin, basal dose must not to be withheld entirely OR for prolonged periods, high risk for DKA. If trending >250 mg/dL longer than 4 hours, please draw appropriate labs to determine if DKA and treat accordingly **   - Lantus 8 units q 24 hrs at 2000  - decrease NPH 18 --> 15 unit(s) at 1000 with tube feeding. Hold if TF held.  - decrease NPH 8 --> 6 unit(s) at 2300 with tube feeding. Hold if TF held.   - relax Novolog Correction Scale: medium resistance (ISF: 50)  every 4 hours   - BG checks q4hr  - PRN D10 at 50 ml/hr if tube feeds are stopped/interrupted (dosed [employer] Standard 1.4 @ goal 45 mL/hr) dosing provides 75% of CHO that would have been given by TF   - Hypoglycemia protocol    - Carb counting protocol     Per ADA  guidelines, treatment goals and recommendations for older adults with DM and medically complexity is less stringent BG control/A1c for safety - targets of 110-180 mg/dL and up to 250 mg/dL reasonable - albeit not persistent for those with T1DM. Avoid reliance on A1c. Glucose decisions should be based on avoidance of hypoglycemia and symptomatic hyperglycemia.     Discussion: Remains NPO on continuous TF. Significant hypoglycemia yesterday afternoon, TF running per nursing documentation. Reduce NPH and relax Novolog correction scale.     Spoke with RD 5/19, may go to bolus TF in the future.    Discharge Planning: (tentative) --> likely TCU at discharge   Medications: TBD  - adjustment of PTA dosing with addition of NPH to cover tube feeds as indicated   Needs set doses for day program, would be able to use ICR at home.   Test Claims:  none needed.   Education:  Needs to be assessed closer to discharge.    Outpatient Follow-up: PCP- Dr Kellogg ( LOV 4/24/25), until able to establish with [facility]- has appointment with Mai Figueroa 8/4/25    Please notify Inpatient Diabetes Service if changes are planned to steroids, nutrition, TPN/TF and anticipated procedures requiring prolonged NPO status.         Interval History/Review of Systems :   - The last 24 hours progress and nursing notes reviewed.  - Limited ROS, patient sleeping and not opening eyes to voice today.   - Tolerating TF without n/v.     Planned Procedures/Surgeries: none    Inpatient Glucose Control:       Recent Labs   Lab 05/20/25  0602 05/20/25  0410 05/20/25  0218 05/20/25  0159 05/20/25  0130 05/19/25  2349   * 121* 152* 184* 69* 80           Medications Impacting Glycemia:   Steroids: none  D5W containing solutions/medications: none   Other medications impacting glucose: none        Nutrition:   Orders Placed This Encounter      NPO for Medical/Clinical Reasons Except for: No Exceptions  Supplements:  none  TPN: none   TF:   5/10:  Osmolite 1.5 (or equivalent) at 10 ml.hr to advance by 10ml every 8 hours (goal 40 ml/hr provides Osmolite 1.5 (or equivalent). Goal: 40 ml/hr provides 8.16 g per hour or 195 CHO in 24 hours)   5/13 - 5/17: been at Osmolite 1.5 goal of 40 ml/hr since 5/13, provides 195 g cho in 24hrs  5/17 - current: changed to continuous [employer] Standard 1.4 @ goal 45 mL/hr provides 170 g cho in 24 hrs         Diabetes History: see full consult note for complete diabetes history   Diabetes Type and Duration: DM for > 40 years, diagnosed in her 40s. Per daughter,  initially told she has T2DM then T1DM for a while then flipped back to being told T2DM.      6/17/2024 positive GAD65 antibody and c-peptide <0.1 but BG elevated     PTA Medication Regimen:   From discharge 3/30/25:- daughter not present to confirm   - Lantus 18 units once daily at HS  - Lispro ICR 1:15 TID AC, 1:20 PRN with snacks/supplements  - Lispro correction 1:50>150 TID AC, >200 HS     At discharge had discussed challenges around glucose lability and dosing: Lantus dose of 18 units may be too high if patient is not eating. However, daughter notes that with more dramatic changes patient will sometimes rise to 400s and has been in DKA before which is hard to balance. If she is concerned about mom going low at night, will try to give her a snack to support Lantus dosing. She is working to resume CGM to more closely monitor blood sugars.  Missing doses?: unknown, daughter not present to confirm   Historical Diabetes Medications: various insulins and dosing      Glucose monitoring device/frequency/trends: Historically has used glucometer before meals and at bedtime. Has tried and failed multiple CGMs (issues with accuracy or patient picking CGM off).   Current trends unknown as daughter not present to discuss- historically known to be labile with needs dropping dramatically when NPO    Hypoglycemia PTA:   - Frequency: currently unknown, but historically labile.   -  Severity: + history of severe unconscious lows   - Awareness: variable, not intact    - Treatment: candy, juice       Outpatient Diabetes Provider: Current PCP: Dr Kellogg ( LOV 4/24/25) ; seen by Dr Bony Castaneda in the past (LOV 10/7/24)   Endo referral placed by PCP and IDS last admission, Scheduled with Mai Figueroa, PAC 8/4/25   Formal Diabetes Education/Educator: none recently          Physical Exam:   /54 (BP Location: Left arm, Patient Position: Semi-Cantu's, Cuff Size: Adult Regular)   Pulse 77   Temp 98.5  F (36.9  C) (Oral)   Resp 18   Wt 56.5 kg (124 lb 9.6 oz)   SpO2 98%   BMI 22.08 kg/m    General:  well appearing, NAD, resting comfortably in bed with eyes closed. Not opening eyes to voice today. TF on at goal.  Lungs: unlabored breathing on RA.  Psych:   calm         Data:     Lab Results   Component Value Date    A1C 8.8 (H) 03/23/2025    A1C 9.6 (H) 01/16/2025    A1C 11.2 (H) 09/23/2024    A1C 10.2 (H) 05/20/2024    A1C 10.7 (H) 01/29/2024    A1C 7.8 (H) 06/21/2021    A1C 11.7 (H) 09/27/2020     ROUTINE IP LABS (Last four results)  BMP  Recent Labs   Lab 05/20/25  0602 05/20/25  0410 05/20/25  0218 05/20/25  0159 05/19/25  0952 05/19/25  0620 05/17/25  0803 05/17/25  0601 05/15/25  0839 05/15/25  0500 05/14/25  1143 05/14/25  0829   NA  --   --   --   --   --  141  --  140  --  142  --  136   POTASSIUM  --   --   --   --   --  4.8  --  4.7  --  4.1  --  4.4   CHLORIDE  --   --   --   --   --  107  --  103  --  108*  --  104   VERONICA  --   --   --   --   --  8.6*  --  8.4*  --  8.6*  --  8.2*   CO2  --   --   --   --   --  24  --  25  --  25  --  22   BUN  --   --   --   --   --  29.4*  --  26.1*  --  22.7  --  21.9   CR  --   --   --   --   --  1.50*  --  1.41*  --  1.40*  --  1.44*   * 121* 152* 184*   < > 120*   < > 322*   < > 78   < > 379*    < > = values in this interval not displayed.     CBC  Recent Labs   Lab 05/19/25  0620 05/17/25  0601 05/15/25  0459 05/14/25  0829   WBC  6.5 5.7 6.3 6.0   RBC 3.65* 3.60* 3.91 3.59*   HGB 9.9* 9.5* 10.6* 9.6*   HCT 31.7* 31.1* 34.2* 30.9*   MCV 87 86 88 86   MCH 27.1 26.4* 27.1 26.7   MCHC 31.2* 30.5* 31.0* 31.1*   RDW 15.8* 15.3* 15.4* 15.4*    194 206 183       Inpatient Diabetes Service will continue to follow, please don't hesitate to contact the team with any questions or concerns.     Karrie Flood PA-C  Inpatient Diabetes Service  Pager: [phone]  Available on vocera    Plan discussed with patient, bedside RN, and primary team via this note.    To contact Inpatient Diabetes Service:     7 AM - 5 PM: Page the IDS BULMARO following the patient that day (see filed or incomplete progress notes/consult notes under Endocrinology)    OR if uncertain of provider assignment: page job code [phone]    5 PM - 7 AM: First call after hours is to primary service.    For urgent after-hours questions, page job code for on call fellow: [phone]     I spent a total of 35 minutes on the date of the encounter doing prep/post-work, chart review, history and exam, documentation and further activities per the note including lab review, multidisciplinary communication, counseling the patient and/or coordinating care regarding acute hyper/hypoglycemic management, as well as discharge management and planning/communication.

## 2025-05-20 NOTE — PLAN OF CARE
[id number]. Alert. Some verbal responses to questions. Asking for something to eat or water. VSS on RA. Denied pain. Ax2 w/ walker and GB. Up in recliner x3. NPO. NG TF @ 45 ml/hr. No BM. Perianal area less reddened today. Plan for video swallow study w/ SLP. TCU placement once med ready.    Goal Outcome Evaluation:      Plan of Care Reviewed With: patient    Overall Patient Progress: no change

## 2025-05-20 NOTE — PROGRESS NOTES
[facility]    Medicine Progress Note - Hospitalist Service, GOLD TEAM 6    Date of Admission:  5/7/2025    Assessment & Plan   Isabell Matthews is a 64 year old female with a past medical history of HTN, T1DM, HLD, diabetic neuropathy, orthostatic hypotension, seizures, vascular dementia, prior CVAs, hypothyroidism, recurrent UTIs, and frequent falls. She initially presented to the ED for evaluation of stroke-like symptoms and was admitted for further monitoring and management.     Updates for 5/20/2025:  - Repeat VFSS ordered with SLP  - SW consulted for possible TCU placement        Multifocal Acute Cerebral Infarcts  Hx of Multiple CVAs  Hx of Seizures  Pt initially presented w/ new findings of dysarthria, dysphagia, decreased responsiveness, confusion. However, while in the ED, no new focal neurologic sx were observed. Head CT negative for acute infarct or hemorrhage. CTA w/ multifocal stenosis involving L RADHA and R MCA which had progressed since 1/16/25. Stroke Neuro team consulted while in the ED, and loaded with Keppra on arrival. EEG negative. Initially, she was found to have a UTI, so there was concern that perhaps her presentation was d/t recrudescence of prior CVA deficits, however, MRI brain did show acute infarcts in R corona radiata, precentral gyrus, and operculum. Unfortunately, she was not a candidate for advanced therapies. Aside from ongoing dysarthria, no new focal neurologic deficits.   Remains clinically stable today. Awake and alert.   - Stroke Neuro team contacted 5/15 given increased somnolence. They suspect multifactorial etiology related to recurrent CVA, recrudescence of prior CVA symptoms              - Given ongoing dysphagia, has a NGT as below              - Keppra level therapeutic              - After care conference on 5/16, decreased Keppra from 500mg BID --> 250mg BID (concern causing sedation)  - Deescalate neuro checks to every shift  given stability  - No definitive plan for PEG-J, therefore started Plavix as part of DAPT on 5/15/25 (needs to continue x 90 days from 5/15)  - Palliative Care consulted, greatly appreciate assistance (see GOC below)  - NPO per SLP for now while awaiting improvement in mentation enough to coordinate VFSS   - Atorvastatin 40mg daily  - At discharge, will need ZioPatch x14 days mailed out  - Stroke Neuro follow-up in 8 weeks     Acute Metabolic Encephalopathy, likely multifactorial  Hx of Vascular Dementia  Acute change in status noted on arrival, possibly 2/2 acute CVA vs metabolic encephalopathy. Likely multifactorial with UTI, dehydration, hyperglycemia, hypercapnia, and hospital environment contributing, as well as newfound acute CVA as above. No significant electrolyte abnormalities. No suspicious meds. TSH 0.14.   - Stroke Neurology reinvolved on 5/15/25 as above  - Monitor clinically  - Bedside sitter discontinued since 5/15  - Delirium precautions              - Would recommend bed inclined, lights on, blinds open and TV on during the day and then at night having all these off and bed more supine to encourage proper sleep-wake cycle  - Continue melatonin 1mg at bedtime      UTI, recurrent, resolved  Urine culture from 5/1/25 grew pan-susceptible E.Coli. Afebrile on arrival, WBC normal. No other obvious source of infection. Repeat UA/UC negative.    - Completed 7 days of IV Ceftriaxone on 5/12     Dysphagia (S/P NGT placement 5/10)  Patient failed initial swallow study. Likely in the setting of acute encephalopathy and acute stroke. SLP consulted, recommended strict NPO. NG placed 5/10, and has now begun TFs without issues. To clarify, prior notes are conflicting and document tube as NJ, but confirmed that this is an NG tube (prior XR shows tip is coiled in gastric lumen). Tube adjusted 5/19 with AXR showing tip projecting over pylorus.  - Continue NPO per SLP              - VFSS attempted 5/13, but unable to  complete d/t somnolence. Repeat study ordered for 5/20 or 5/21  - Resumed all enteral meds on 5/13 via NGT  - Now up to goal rate of 45mL/hr for TFs via NGT, appears to be tolerating well     Hypernatremia, resolved  Free water deficit in setting of dysphagia and NPO status. Resolved with hypotonic fluids.   - Trend lytes daily      Possible Stress Cardiomyopathy  ECHO in the ED showed LVEF 50-55% with mid ventricular hypokinesis w/ preserved apical and mid segements c/w stress CM. No obvious signs of heart failure. Clinically, she has no s/sx to suggest acutely decompensated HF.  - Continue to monitor  - Continue Carvedilol w/ hold parameters     Type 1 Diabetes Mellitus  Hypoglycemia  A1c of 8.8% in 3/2025. BG in range of 300-400 this admission. NPO due to dysphagia. Hx of labile sugars and hypoglycemia in setting of infection. Home regimen: Lantus 18 units at bedtime. Endocrine consulted to assist with insulin dosing in setting of tube feeds. Intermittent hypoglycemia, down to 40s overnight. Suspect possibly r/t kink in tubing (see above), resulting in improper administration of TFs.  - Endocrine following, appreciate assistance              - Adjusted Lantus to 8 units at 2000                 - Decrease NPH 15 units qAM + 6 units qPM              - Medium sliding scale insulin Q4H for now while NPO              - BG checks Q4H while NPO              - Hypoglycemia protocol              - PRN D10 at 60mL/hr if TFs are stopped/interrupted     Hypothyroidism  Most recent TSH 0.99. Repeat TSH here 0.14. On admission, daughter noted recent medication adjustments (brand synthroid vs generic) therefore may have been over-replaced. Cannot r/o sick euthyroid state as well.  - Continue PTA PO synthroid 88mcg   - Recheck TSH in 1-2 weeks when clinically stable     CKD stage IIIb  Baseline Cr of 1.4-1.6. Cr improved to 1.3-1.4 with IVF. Suspect chronic dehydration contributing.  - BMP daily for now  - Avoid nephrotoxic  agents as best as possible     HTN  HLD  -180 on admission. Can allow for permissive hypertension in setting of acute stroke per Stroke Neuro.  - Continue PTA Carvedilol w/ hold parameters  - Continue PTA Atorvastatin w/ enteral access via NG  - Transitioned AR ASA to PO ASA 324mg daily as above  - IV hydralazine PRN for SBP>180     Chronic Low Back Pain  - Diclofenac gel PRN  - Continue PTA Duloxetine, Gabapentin as now have enteral access     Seasonal allergies  - Hold PTA loratidine for now to reduce pill burden via NGT     Closed Colles' Fracture of L Wrist  Occurred in April, saw Ortho 4/22/25, but not deemed a surgical candidate d/t her medical comorbidities.  Per Ortho, they wanted her in brace and to be NWB status at L wrist. Case discussed with Ortho on 5/19. Repeat XR pending.   - Ortho consulted  - Repeat XR pending  - NWB status to L wrist   - Thumb spica brace to L wrist pending ortho consult    Goals of Care  Palliative Care consulted on 5/15/25 given daughter (Vishal) expressing concern over the direction of the patient's care and what the future looks like for her.   - Greatly appreciate Palliative Care's involvement and spearheading of care conference on 5/16 (with our team, theirs, Stroke Neuro, SW, and two daughters [Felton & Vishal]). See their excellent note outlining the details regarding conversation  - In brief:              - Will stay the course w/ restorative cares and but to be DNR/DNI. The daughters need to take time to absorb the information from the care conference and will reconvene with us early next week regarding their decision and path forward. At this time, they would like to begin SW to place referrals to various TCUs in the [facility], while explicitly emphasizing their desire to stay away from LTC. However, they will discuss this more this weekend and get back to us early next week              - Adjusted Keppra down to 250mg BID (from 500mg BID)              -  Keeping all meds otherwise the same for now          Diet: NPO for Medical/Clinical Reasons Except for: No Exceptions  Adult Formula Drip Feeding: Continuous [employer] Standard 1.4; Nasogastric tube; Goal Rate: 45 ( can start at goal ); mL/hr; Initiate at 10ml/hr and advance by 10ml Q8H as tolerated.; Do not advance tube feeding rate unless K+ is = or > 3.0, Mg++...    DVT Prophylaxis: Pneumatic Compression Devices  Parker Catheter: Not present  Lines: None     Cardiac Monitoring: None  Code Status: No CPR- Do NOT Intubate      Clinically Significant Risk Factors               # Hypoalbuminemia: Lowest albumin = 2.9 g/dL at 5/17/2025  6:01 AM, will monitor as appropriate     # Hypertension: Noted on problem list     # Dementia: noted on problem list         # Severe Malnutrition: based on nutrition assessment and treatment provided per dietitian's recommendations.    # Financial/Environmental Concerns:           Social Drivers of Health    Food Insecurity: Unknown (5/17/2025)    Food Insecurity     Within the past 12 months, did you worry that your food would run out before you got money to buy more?: Patient unable to answer     Within the past 12 months, did the food you bought just not last and you didn t have money to get more?: Patient unable to answer   Depression: At risk (4/2/2025)    PHQ-2     PHQ-2 Score: 4   Housing Stability: Unknown (5/17/2025)    Housing Stability     Do you have housing? : Patient unable to answer     Are you worried about losing your housing?: Patient unable to answer   Tobacco Use: Medium Risk (4/24/2025)    Patient History     Smoking Tobacco Use: Former     Smokeless Tobacco Use: Never   Financial Resource Strain: Unknown (5/17/2025)    Financial Resource Strain     Within the past 12 months, have you or your family members you live with been unable to get utilities (heat, electricity) when it was really needed?: Patient unable to answer   Transportation Needs: Unknown (5/17/2025)     Transportation Needs     Within the past 12 months, has lack of transportation kept you from medical appointments, getting your medicines, non-medical meetings or appointments, work, or from getting things that you need?: Patient unable to answer   Interpersonal Safety: Unknown (5/17/2025)    Interpersonal Safety     Do you feel physically and emotionally safe where you currently live?: Patient unable to answer     Within the past 12 months, have you been hit, slapped, kicked or otherwise physically hurt by someone?: Patient unable to answer     Within the past 12 months, have you been humiliated or emotionally abused in other ways by your partner or ex-partner?: Patient unable to answer          Disposition Plan     Medically Ready for Discharge: Anticipated in 2-4 Days           The patient's care was discussed with the Attending Physician, Dr. Palomares.    Javier Johnson PA-C  Hospitalist Service, [address]  Securely message with [employer] (more info)  Text page via [facility] Paging/Directory   See signed in provider for up to date coverage information  ______________________________________________________________________    Interval History   Patient awake and alert today. Sitting up in chair. Nonverbal. Shakes head yes/no to questions. Denies any pain, dyspnea, nausea, vomiting. Still having loose stools. No abdominal pain.     Physical Exam   Vital Signs: Temp: 98.1  F (36.7  C) Temp src: Oral BP: (!) 147/62 Pulse: 81   Resp: 18 SpO2: 98 % O2 Device: None (Room air)    Weight: 124 lbs 9.6 oz    General Appearance:  Awake. Alert. NAD.   HEENT:  Unremarkable.   Respiratory:  Normal effort. CTAB. No wheezing, rhonchi, rales.   Cardiovascular:  RRR. S1/S2. No murmurs.   GI:  Soft, non-distended, non-tender, +BS.   Extremity:  Trace pitting edema BLE  Skin:  No visible rash.   Neuro:  Grossly non-focal. ? Left facial droop.     Medical Decision Making        40 MINUTES SPENT BY ME on the date of service doing chart review, history, exam, documentation & further activities per the note.      Data         Imaging results reviewed over the past 24 hrs:   No results found for this or any previous visit (from the past 24 hours).

## 2025-05-20 NOTE — PROGRESS NOTES
/54 (BP Location: Right arm, Cuff Size: Adult Regular)   Pulse 82   Temp 97.9  F (36.6  C) (Oral)   Resp 16   Wt 56.5 kg (124 lb 9.6 oz)   SpO2 95%   BMI 22.08 kg/m      Activity: Pt is a lift with A2 GB/Walker, was in recliner for a good chunk of shift, now in bed, turns q2H  Neuros: Lethargic/drowsy for most of shit, opens eye to voice, can nod yes/no to some questions, flat affect, PERRLA, weak hand , dorsiflexion weak, face asymmetrical   Cardiac:  WDL  Respiratory:  WDL on RA. Denies SOB.  GI/: Incontinent of bowel/bladder   Diet: Strict NPO  Skin: Perianal redness, left knee/shin scabs, left/right upper/forearm bruising  Lines: Right PIV x2-SL  Incisions/Drains: None  Labs: One episode of hypoglycemia  Pain/nausea:  Denies.  New changes this shift: Pt was hypoglycemic with value of 37 mg/dl, 25 ml of D50 administered via IV with immediate effect (176 mg/dl and 167 mg/dl), provider paged and is aware  Plan:  Continue to monitor BG, neuro check qshift,

## 2025-05-20 NOTE — PROGRESS NOTES
Brief Medicine Note    Curbsided case today with Orthopedics re: L wrist Colles fracture she sustained back in April. See OP Orthopedics note from 4/22/25 for details. She was supposed to have OP Ortho follow-up, but d/t being admitted missed this appt. Ran case by Ortho resident today who notes that they will be reviewing case tomorrow and reaching out to Medicine to discuss plan. Appreciate assistance from Ortho. For now, follow plan as outlined in Medicine progress note from today until Ortho can weigh in.    Jimmy Moore PA-C  [facility] Medicine   [facility]

## 2025-05-20 NOTE — PLAN OF CARE
Goal Outcome Evaluation:    Plan of Care Reviewed With: patient    Overall Patient Progress: no change  Overall Patient Progress: no change    Outcome Evaluation: Assumed care from 1026-0052. Vitally stable on RA. Alert, nonverbal - unable to assess orientation. NG intact, TF infusing 45mL/hr. Remains strict NPO. 2 PIV's intact, SL. Blood glucoses monitored (80, 69, 184, 152, 121, 106). PRN D50 administered for hypoglycemia with relief. Incontinent of urine x2. Tolerated repositioning Q2h well. Continue with plan of care.     Candelaria Obrien RN

## 2025-05-21 ENCOUNTER — TELEPHONE (OUTPATIENT)
Dept: ORTHOPEDICS | Facility: CLINIC | Age: 65
End: 2025-05-21
Payer: COMMERCIAL

## 2025-05-21 ENCOUNTER — APPOINTMENT (OUTPATIENT)
Dept: PHYSICAL THERAPY | Facility: CLINIC | Age: 65
End: 2025-05-21
Payer: COMMERCIAL

## 2025-05-21 LAB
ALBUMIN SERPL BCG-MCNC: 3.1 G/DL (ref 3.5–5.2)
ALP SERPL-CCNC: 251 U/L (ref 40–150)
ALT SERPL W P-5'-P-CCNC: 26 U/L (ref 0–50)
ANION GAP SERPL CALCULATED.3IONS-SCNC: 10 MMOL/L (ref 7–15)
AST SERPL W P-5'-P-CCNC: 39 U/L (ref 0–45)
BASOPHILS # BLD AUTO: 0 10E3/UL (ref 0–0.2)
BASOPHILS NFR BLD AUTO: 0 %
BILIRUB SERPL-MCNC: 0.2 MG/DL
BUN SERPL-MCNC: 30.4 MG/DL (ref 8–23)
CALCIUM SERPL-MCNC: 8.5 MG/DL (ref 8.8–10.4)
CHLORIDE SERPL-SCNC: 104 MMOL/L (ref 98–107)
CREAT SERPL-MCNC: 1.45 MG/DL (ref 0.51–0.95)
EGFRCR SERPLBLD CKD-EPI 2021: 40 ML/MIN/1.73M2
EOSINOPHIL # BLD AUTO: 0.2 10E3/UL (ref 0–0.7)
EOSINOPHIL NFR BLD AUTO: 3 %
ERYTHROCYTE [DISTWIDTH] IN BLOOD BY AUTOMATED COUNT: 15.8 % (ref 10–15)
GLUCOSE BLDC GLUCOMTR-MCNC: 133 MG/DL (ref 70–99)
GLUCOSE BLDC GLUCOMTR-MCNC: 157 MG/DL (ref 70–99)
GLUCOSE BLDC GLUCOMTR-MCNC: 197 MG/DL (ref 70–99)
GLUCOSE BLDC GLUCOMTR-MCNC: 199 MG/DL (ref 70–99)
GLUCOSE BLDC GLUCOMTR-MCNC: 228 MG/DL (ref 70–99)
GLUCOSE BLDC GLUCOMTR-MCNC: 233 MG/DL (ref 70–99)
GLUCOSE BLDC GLUCOMTR-MCNC: 284 MG/DL (ref 70–99)
GLUCOSE SERPL-MCNC: 210 MG/DL (ref 70–99)
HCO3 SERPL-SCNC: 25 MMOL/L (ref 22–29)
HCT VFR BLD AUTO: 30.7 % (ref 35–47)
HGB BLD-MCNC: 9.6 G/DL (ref 11.7–15.7)
IMM GRANULOCYTES # BLD: 0 10E3/UL
IMM GRANULOCYTES NFR BLD: 1 %
LYMPHOCYTES # BLD AUTO: 1.5 10E3/UL (ref 0.8–5.3)
LYMPHOCYTES NFR BLD AUTO: 28 %
MCH RBC QN AUTO: 27.3 PG (ref 26.5–33)
MCHC RBC AUTO-ENTMCNC: 31.3 G/DL (ref 31.5–36.5)
MCV RBC AUTO: 87 FL (ref 78–100)
MONOCYTES # BLD AUTO: 0.4 10E3/UL (ref 0–1.3)
MONOCYTES NFR BLD AUTO: 7 %
NEUTROPHILS # BLD AUTO: 3.3 10E3/UL (ref 1.6–8.3)
NEUTROPHILS NFR BLD AUTO: 61 %
NRBC # BLD AUTO: 0 10E3/UL
NRBC BLD AUTO-RTO: 0 /100
PLATELET # BLD AUTO: 214 10E3/UL (ref 150–450)
POTASSIUM SERPL-SCNC: 4.6 MMOL/L (ref 3.4–5.3)
PROT SERPL-MCNC: 5.7 G/DL (ref 6.4–8.3)
RBC # BLD AUTO: 3.52 10E6/UL (ref 3.8–5.2)
SODIUM SERPL-SCNC: 139 MMOL/L (ref 135–145)
WBC # BLD AUTO: 5.3 10E3/UL (ref 4–11)

## 2025-05-21 PROCEDURE — 250N000012 HC RX MED GY IP 250 OP 636 PS 637: Performed by: STUDENT IN AN ORGANIZED HEALTH CARE EDUCATION/TRAINING PROGRAM

## 2025-05-21 PROCEDURE — 250N000009 HC RX 250: Performed by: STUDENT IN AN ORGANIZED HEALTH CARE EDUCATION/TRAINING PROGRAM

## 2025-05-21 PROCEDURE — 250N000013 HC RX MED GY IP 250 OP 250 PS 637

## 2025-05-21 PROCEDURE — 99232 SBSQ HOSP IP/OBS MODERATE 35: CPT | Mod: 24 | Performed by: NURSE PRACTITIONER

## 2025-05-21 PROCEDURE — 80053 COMPREHEN METABOLIC PANEL: CPT

## 2025-05-21 PROCEDURE — 36415 COLL VENOUS BLD VENIPUNCTURE: CPT

## 2025-05-21 PROCEDURE — 85004 AUTOMATED DIFF WBC COUNT: CPT

## 2025-05-21 PROCEDURE — 250N000013 HC RX MED GY IP 250 OP 250 PS 637: Performed by: STUDENT IN AN ORGANIZED HEALTH CARE EDUCATION/TRAINING PROGRAM

## 2025-05-21 PROCEDURE — 120N000002 HC R&B MED SURG/OB UMMC

## 2025-05-21 PROCEDURE — 250N000011 HC RX IP 250 OP 636

## 2025-05-21 PROCEDURE — 97530 THERAPEUTIC ACTIVITIES: CPT | Mod: GP

## 2025-05-21 PROCEDURE — 97116 GAIT TRAINING THERAPY: CPT | Mod: GP

## 2025-05-21 PROCEDURE — 250N000012 HC RX MED GY IP 250 OP 636 PS 637: Performed by: NURSE PRACTITIONER

## 2025-05-21 RX ADMIN — LEVETIRACETAM 250 MG: 100 SOLUTION ORAL at 09:24

## 2025-05-21 RX ADMIN — GABAPENTIN 100 MG: 100 CAPSULE ORAL at 21:12

## 2025-05-21 RX ADMIN — Medication 15 ML: at 09:24

## 2025-05-21 RX ADMIN — DICLOFENAC SODIUM 2 G: 10 GEL TOPICAL at 12:32

## 2025-05-21 RX ADMIN — HEPARIN SODIUM 5000 UNITS: 5000 INJECTION, SOLUTION INTRAVENOUS; SUBCUTANEOUS at 15:18

## 2025-05-21 RX ADMIN — CARVEDILOL 6.25 MG: 3.12 TABLET, FILM COATED ORAL at 18:28

## 2025-05-21 RX ADMIN — HEPARIN SODIUM 5000 UNITS: 5000 INJECTION, SOLUTION INTRAVENOUS; SUBCUTANEOUS at 21:12

## 2025-05-21 RX ADMIN — DICLOFENAC SODIUM 2 G: 10 GEL TOPICAL at 15:18

## 2025-05-21 RX ADMIN — Medication 20 MG: at 09:24

## 2025-05-21 RX ADMIN — ATORVASTATIN CALCIUM 40 MG: 40 TABLET, FILM COATED ORAL at 09:24

## 2025-05-21 RX ADMIN — INSULIN GLARGINE 8 UNITS: 100 INJECTION, SOLUTION SUBCUTANEOUS at 20:23

## 2025-05-21 RX ADMIN — INSULIN ASPART 1 UNITS: 100 INJECTION, SOLUTION INTRAVENOUS; SUBCUTANEOUS at 20:22

## 2025-05-21 RX ADMIN — INSULIN ASPART 2 UNITS: 100 INJECTION, SOLUTION INTRAVENOUS; SUBCUTANEOUS at 00:48

## 2025-05-21 RX ADMIN — INSULIN HUMAN 8 UNITS: 100 INJECTION, SUSPENSION SUBCUTANEOUS at 22:31

## 2025-05-21 RX ADMIN — MICONAZOLE NITRATE: 20 CREAM TOPICAL at 20:22

## 2025-05-21 RX ADMIN — DICLOFENAC SODIUM 2 G: 10 GEL TOPICAL at 09:23

## 2025-05-21 RX ADMIN — MICONAZOLE NITRATE: 20 CREAM TOPICAL at 09:23

## 2025-05-21 RX ADMIN — Medication 1 MG: at 21:12

## 2025-05-21 RX ADMIN — LEVOTHYROXINE SODIUM 88 MCG: 88 TABLET ORAL at 09:24

## 2025-05-21 RX ADMIN — INSULIN ASPART 3 UNITS: 100 INJECTION, SOLUTION INTRAVENOUS; SUBCUTANEOUS at 09:23

## 2025-05-21 RX ADMIN — CLOPIDOGREL BISULFATE 75 MG: 75 TABLET, FILM COATED ORAL at 09:24

## 2025-05-21 RX ADMIN — CARVEDILOL 6.25 MG: 3.12 TABLET, FILM COATED ORAL at 09:24

## 2025-05-21 RX ADMIN — ASPIRIN 81 MG CHEWABLE TABLET 324 MG: 81 TABLET CHEWABLE at 09:24

## 2025-05-21 RX ADMIN — INSULIN ASPART 2 UNITS: 100 INJECTION, SOLUTION INTRAVENOUS; SUBCUTANEOUS at 16:24

## 2025-05-21 RX ADMIN — HEPARIN SODIUM 5000 UNITS: 5000 INJECTION, SOLUTION INTRAVENOUS; SUBCUTANEOUS at 06:40

## 2025-05-21 RX ADMIN — INSULIN ASPART 2 UNITS: 100 INJECTION, SOLUTION INTRAVENOUS; SUBCUTANEOUS at 12:32

## 2025-05-21 RX ADMIN — Medication 1000 MCG: at 09:24

## 2025-05-21 RX ADMIN — LEVETIRACETAM 250 MG: 100 SOLUTION ORAL at 21:12

## 2025-05-21 RX ADMIN — DICLOFENAC SODIUM 2 G: 10 GEL TOPICAL at 20:22

## 2025-05-21 RX ADMIN — INSULIN ASPART 2 UNITS: 100 INJECTION, SOLUTION INTRAVENOUS; SUBCUTANEOUS at 04:43

## 2025-05-21 ASSESSMENT — ACTIVITIES OF DAILY LIVING (ADL)
ADLS_ACUITY_SCORE: 98
ADLS_ACUITY_SCORE: 98
ADLS_ACUITY_SCORE: 93
ADLS_ACUITY_SCORE: 94
ADLS_ACUITY_SCORE: 93
ADLS_ACUITY_SCORE: 98
ADLS_ACUITY_SCORE: 93
ADLS_ACUITY_SCORE: 98
ADLS_ACUITY_SCORE: 97
ADLS_ACUITY_SCORE: 93
ADLS_ACUITY_SCORE: 97
ADLS_ACUITY_SCORE: 102
ADLS_ACUITY_SCORE: 93
ADLS_ACUITY_SCORE: 98
ADLS_ACUITY_SCORE: 93
ADLS_ACUITY_SCORE: 98
ADLS_ACUITY_SCORE: 94
ADLS_ACUITY_SCORE: 98
ADLS_ACUITY_SCORE: 93
ADLS_ACUITY_SCORE: 94

## 2025-05-21 NOTE — PROGRESS NOTES
Inpatient Diabetes Management Service: Daily Progress Note     HPI: Isabell Matthews is a 64 year old female with history of  T1DM with diabetic neuropathy, CKD3b, HTN, HLD, orthostatic hypotension, seizures, vascular dementia, hypothyroidism, recurrent UTI's, and frequent falls who was admitted to [facility] 5/7/2025 with concern for stroke after presenting with acute neurologic symptoms. Hospital course has been complicated by labile BG and dysphagia, she continues to be NPO and continues to be on continuous TF.     IDS consulted to assist with glycemic management as well as optimization while inpatient and when applicable, recommendations for transition home.         Assessment/Plan:     Assessment:  Type 1 Diabetes Mellitus c/b peripheral neuropathy, nephropathy, Sub-optimal control. (A1c 8.8 % 3/23/25) in presence of complex illness and anemia.   Vascular dementia with acute cerebral infarcts new findings of dysarthria, dysphagia, decreased responsiveness, confusion   Labile BG 2/2 Tube feedings  BMI: 22    Plan/Recommendations:  ** patient has T1DM - requires insulin, basal dose must not to be withheld entirely OR for prolonged periods, high risk for DKA. If trending >250 mg/dL longer than 4 hours, please draw appropriate labs to determine if DKA and treat accordingly **     - Lantus 8 units q 24 hrs at 1900 [DKA protective dose]  - NPH 15 unit(s) at 0900 with tube feeding ([employer] 1.4 at 45 ml/hr). Hold if TF held.  - NPH 8 unit(s) at 2100 with tube feeding ([employer] 1.4 at 45 ml/hr). Hold if TF held.   - Novolog Correction Scale: medium resistance (ISF: 50)  every 4 hours   - BG checks q4hr  - PRN D10 at 50 ml/hr if tube feeds are stopped/interrupted (dosed [employer] Standard 1.4 @ goal 45 mL/hr) dosing provides 75% of CHO that would have been given by TF   - Hypoglycemia protocol    - Carb counting protocol     Per ADA guidelines, treatment goals and recommendations for older adults with  DM and medically complexity is less stringent BG control/A1c for safety - targets of 110-180 mg/dL and up to 250 mg/dL reasonable - albeit not persistent for those with T1DM. Avoid reliance on A1c. Glucose decisions should be based on avoidance of hypoglycemia and symptomatic hyperglycemia.         Discussion: Remains NPO on continuous TF.  Likely to go to bolus TF in the future.    Less lability, no severe lows. Bg > target. Will incrementally adjust to target.     Creat 1.45/GFR 40  Bicarb: 25/Gap  10        Discharge Planning: (tentative) --> likely TCU at discharge   Medications: TBD  - adjustment of PTA dosing with addition of NPH to cover tube feeds as indicated   Needs set doses for day program, would be able to use ICR at home.   Test Claims:  none needed.   Education:  Needs to be assessed closer to discharge.    Outpatient Follow-up: PCP- Dr Kellogg ( LOV 4/24/25), until able to establish with [facility] Endocr[facility]ology- has appointment with Mai Figueroa 8/4/25    Please notify Inpatient Diabetes Service if changes are planned to steroids, nutrition, TPN/TF and anticipated procedures requiring prolonged NPO status.         Interval History/Review of Systems :   - The last 24 hours progress and nursing notes reviewed.    - up in chair. Minimally interactive    Inpatient Glucose Control:                 Planned Procedures/Surgeries: none    Recent Labs   Lab 05/21/25  0424 05/21/25  0045 05/20/25  2056 05/20/25 2001 05/20/25  1645 05/20/25  1253   * 228* 227* 214* 108* 163*           Medications Impacting Glycemia:   Steroids: none  D5W containing solutions/medications: none   Other medications impacting glucose: none        Nutrition:   Orders Placed This Encounter      NPO for Medical/Clinical Reasons Except for: No Exceptions  Supplements:  none  TPN: none   TF:   5/13 - 5/17: been at [employer] 1.5 goal of 40 ml/hr since 5/13, provides 195 g cho in 24hrs  5/17 - current: changed to continuous [employer] Standard 1.4 @ goal 45 mL/hr provides 170 g cho in 24 hrs         Diabetes History: see full consult note for complete diabetes history   Diabetes Type and Duration: DM for > 40 years, diagnosed in her 40s. Per daughter,  initially told she has T2DM then T1DM for a while then flipped back to being told T2DM.      6/17/2024 positive GAD65 antibody and c-peptide <0.1 but BG elevated     PTA Medication Regimen:   From discharge 3/30/25:- daughter not present to confirm   - Lantus 18 units once daily at HS  - Lispro ICR 1:15 TID AC, 1:20 PRN with snacks/supplements  - Lispro correction 1:50>150 TID AC, >200 HS     At discharge had discussed challenges around glucose lability and dosing: Lantus dose of 18 units may be too high if patient is not eating. However, daughter notes that with more dramatic changes patient will sometimes rise to 400s and has been in DKA before which is hard to balance. If she is concerned about mom going low at night, will try to give her a snack to support Lantus dosing. She is working to resume CGM to more closely monitor blood sugars.  Missing doses?: unknown, daughter not present to confirm   Historical Diabetes Medications: various insulins and dosing      Glucose monitoring device/frequency/trends: Historically has used glucometer before meals and at bedtime. Has tried and failed multiple CGMs (issues with accuracy or patient picking CGM off).   Current trends unknown as daughter not present to discuss- historically known to be labile with needs dropping dramatically when NPO    Hypoglycemia PTA:   - Frequency: currently unknown, but historically labile.   - Severity: + history of severe unconscious lows   - Awareness: variable, not intact    - Treatment: candy, juice       Outpatient Diabetes Provider: Current PCP: Dr Kellogg ( LOV 4/24/25) ; seen by Dr Bony Castaneda in the past (LOV 10/7/24)   Endo referral placed by PCP and IDS last admission, Scheduled with NEGRA Ramirez  8/4/25   Formal Diabetes Education/Educator: none recently          Physical Exam:   /58 (BP Location: Right arm)   Pulse 79   Temp 98.1  F (36.7  C) (Oral)   Resp 16   Wt 56.5 kg (124 lb 9.6 oz)   SpO2 98%   BMI 22.08 kg/m    General:  stable appearing, NAD, resting comfortably in chair with eyes closed. Not opening eyes to voice today. TF on at goal.  Lungs: unlabored breathing on RA.  Psych:   calm         Data:     Lab Results   Component Value Date    A1C 8.8 (H) 03/23/2025    A1C 9.6 (H) 01/16/2025    A1C 11.2 (H) 09/23/2024    A1C 10.2 (H) 05/20/2024    A1C 10.7 (H) 01/29/2024    A1C 7.8 (H) 06/21/2021    A1C 11.7 (H) 09/27/2020     ROUTINE IP LABS (Last four results)  BMP  Recent Labs   Lab 05/21/25  0424 05/21/25  0045 05/20/25 2056 05/20/25 2001 05/19/25  0952 05/19/25  0620 05/17/25  0803 05/17/25  0601 05/15/25  0839 05/15/25  0500 05/14/25  1143 05/14/25  0829   NA  --   --   --   --   --  141  --  140  --  142  --  136   POTASSIUM  --   --   --   --   --  4.8  --  4.7  --  4.1  --  4.4   CHLORIDE  --   --   --   --   --  107  --  103  --  108*  --  104   VERONICA  --   --   --   --   --  8.6*  --  8.4*  --  8.6*  --  8.2*   CO2  --   --   --   --   --  24  --  25  --  25  --  22   BUN  --   --   --   --   --  29.4*  --  26.1*  --  22.7  --  21.9   CR  --   --   --   --   --  1.50*  --  1.41*  --  1.40*  --  1.44*   * 228* 227* 214*   < > 120*   < > 322*   < > 78   < > 379*    < > = values in this interval not displayed.     CBC  Recent Labs   Lab 05/21/25  0551 05/19/25  0620 05/17/25  0601 05/15/25  0459   WBC 5.3 6.5 5.7 6.3   RBC 3.52* 3.65* 3.60* 3.91   HGB 9.6* 9.9* 9.5* 10.6*   HCT 30.7* 31.7* 31.1* 34.2*   MCV 87 87 86 88   MCH 27.3 27.1 26.4* 27.1   MCHC 31.3* 31.2* 30.5* 31.0*   RDW 15.8* 15.8* 15.3* 15.4*    220 194 206     Inpatient Diabetes Service will continue to follow, please don't hesitate to contact the team with any questions or concerns.     Aster  PEGGY Brownlee-CNP, BC-ADM   Inpatient Diabetes Service  Available on [facility] [place] - when on duty.     To contact Inpatient Diabetes Service:     7 AM - 5 PM: Page the IDS BULMARO following the patient that day (see filed or incomplete progress notes/consult notes under Endocrinology)    OR if uncertain of provider assignment: page job code [phone]    5 PM - 7 AM: First call after hours is to primary service.    For urgent after-hours questions, page job code for on call fellow: [phone]     I spent a total of 35 minutes on the date of the encounter doing prep/post-work, chart review, history and exam, documentation and further activities per the note including lab review, multidisciplinary communication, counseling the patient and/or coordinating care regarding acute hyper/hypoglycemic management, as well as discharge management and planning/communication.  See note for details.      Please notify inpatient diabetes service if changes are planned to steroids, nutrition, or if procedures are planned requiring prolonged NPO status.Diabetes Management Team job code: [phone]

## 2025-05-21 NOTE — CONSULTS
Care Management Follow Up    Length of Stay (days): 14  Expected Discharge Date: 05/26/2025  Concerns to be Addressed: discharge planning     Patient plan of care discussed at interdisciplinary rounds: Yes  Anticipated Discharge Disposition: Transitional Care  Anticipated Discharge Services: None  Anticipated Discharge DME: None  Patient/family educated on Medicare website which has current facility and service quality ratings: yes  Education Provided on the Discharge Plan: Yes  Patient/Family in Agreement with the Plan: yes  Referrals Placed by CM/SW: Post Acute Facilities  Private pay costs discussed: Not applicable  Discussed  Partnership in Safe Discharge Planning  document with patient/family: Yes   Handoff Completed: No, handoff not indicated or clinically appropriate  Additional Information: Current dispo plan is TCU and then return home with daughter and EW services.    Writer contacted ivana Pual for [facility] to determine if pt has been reviewed for TCU at their facility. They are reviewing pt, but need to know final nutrition plan.    Writer noted that previously placed referrals had been exhausted and placed additional TCU referrals to facilities within 25 miles of the family home that take the pt's insurance and are 4-5 star rated on the Medicare Care Compare tool. These additional referrals were placed pursuant to Partnership in Safe Discharge Planning policy. Multiple facilities have declined, please see below. Pt was clinically accepted to a [facility] called and while they couldn't offer a bed to [place] [facility] [place], they could offer a bed at [facility].     [facility] Provider updated on accepting facility. Plans to have discussion on nutrition plan with pt's daughters.    Pending Referrals:    [facility] in [place] (P: [phone])  [facility] [place] [address]  5/21: They continue to review and want to know nutrition plan.    [place]  "Facility Global Referral (Liaison, Consuelo Power; P: [phone]; F: [phone]; E: [email or web address])  5/21: Global referral sent. [facility] [place] have no beds, but they can offer a bed at [facility], even with NG tube.    Inactive Referrals:    [facility] - Cannot meet pt's needs.  [facility] - Bed not available.  [facility] - Bed not available; acuity too high.  [facility]erral (6 facilities) - Global denial as acuity is too high.  [facility] - NJ tube; acuity too high  [facility] - Bed Not Available   [facility] - Cannot meet pt needs.  [facility] - no safe room r/t chronic confusion and fall hx   [facility] - Bed Not Available   [facility] - Bed not available.  [facility] - Declined; no reason given.    Next Steps: Care management team will follow.    ROBIN Villarreal, [facility]  Clinical , [facility]-7C  Desk Phone: [phone]   Schedule: Wednesday, Thursday, Fridays (for Mondays & Tuesdays, contact job share partner Josselyn Leblancs)  Securely message with [employer] (Search Name or 7C Med Surg [phone] SW)  Text page via [facility] Paging/Directory   See signed in provider for up to date coverage information  Social Work & Care Management Department    Weekend And After Hours Care Management Contacts:  After Hours Social Work Coverage [phone] daily: Searchable in Vocera \"Adult SW After Hours On Call\"   Weekend Coverage [phone]: Searchable in Vocera \"7C Med Surg SW\" or \"7C Med Surg RNCC\"  "

## 2025-05-21 NOTE — PROGRESS NOTES
Orthopedic Surgery Progress Note 05/21/2025    S:   Isabell Matthews is a 64 year old female who sustained a left distal radius fracture 3/23/25 in a ground level fall. Was seen in the hospital, plan for non-operative management. Seen for follow-up on 4/22 with continuation of plan. Seen today inpatient as patient unable to make outpatient follow-up appointments while admitted.     Patient reports improvement from wrist perspective, minimal residual pain.  Has been wearing exos brace consistently, brace fitting well. Patient denies any new numbness or tingling in the fingers.    O:  Temp: 98  F (36.7  C) Temp src: Oral BP: (!) 142/65 Pulse: 80   Resp: 16 SpO2: 98 % O2 Device: None (Room air)      Exam:  Gen: alert and oriented, responds to questions appropriately  Resp: non-labored on RA  MSK:  LUE:  - No deformity, skin intact.  - SILT medial/radial/ulnar/axillary nerves  - Fires EPL, FPL, intrinsics  - Radial pulse 2+, hand wwp    Imaging  Repeat x-rays of left wrist today showing stable alignment of previously known left intraarticular distal radius fracture, some bony callous formation noted at fracture site.      Assessment: Isabell Matthews is a 64 year old female who sustained a left distal radius fracture 3/23/25 in a ground level fall, treated with nonoperative management. Seen today inpatient as patient unable to make outpatient follow-up appointments while admitted. Updated imaging and exam reviewed today.    Plan updates:  - Begin working with Hand Therapy for LUE strengthening and ROM  - Progress weightbearing to coffee-cup while in brace  - Progress ROM as tolerated out of brace    Medicine primary  Activity: Progress ROM as tolerated out of brace. OK for platform WB if needed.  Weight bearing status: Coffee-cup weightbearing brace.  Diet: OK for regular per ortho  DVT prophylaxis: per primary  Bracing/Splinting: Continue Exos brace with activity, can remove at rest and for ROM exercises.  Elevation: Elevate  injured extremity as tolerated.   Pain management: per primary  X-rays: Complete  Therapies: Recommend begin working with Hand Therapy for strengthening and ROM.  Disposition: Per primary, no ortho barriers to discharge  Follow-up: Clinic with Dr. Dang in 4 weeks with repeat x-rays (schedulers messaged).    --  Sharon Reynaga MD  Orthopedic Surgery, PGY1

## 2025-05-21 NOTE — PLAN OF CARE
Goal Outcome Evaluation:  Shift Hours: 1900 - 2300    Assessment:  Body systems that were not at patient's baseline Cognitive/Behavioral/Neuro, Skin, and Safety. Focused body system assessments documented in flowsheets.        Activity     Fall Risk Score: 95   Bed alarm on? Yes     Activity Assistance Provided: assistance, 2 people      Assistive Device Utilized: walker, gait belt    Pain: Denied    Labs/RN Managed Protocols: None    Lines/Drains: 2 PIV SL    Nutrition: NPO/TF    Goal Outcome Evaluation    Plan of Care Reviewed With: patient  Overall Patient Progress: improving  Outcome Evaluation: Patient rested and watched TV this shift. BG monitored and covered this shift. Turned Q2. Incontinent. TF and meds continued through NJ. Bed alarm on for safety. Barrier cream applied to bottom. CPOC.    Barriers to Discharge:     TCU   Swallow Study

## 2025-05-21 NOTE — TELEPHONE ENCOUNTER
Patient was scheduled as requested in message below.  Patient will see appt in discharge papers from hospital.

## 2025-05-21 NOTE — PLAN OF CARE
Shift Hours: 2300 - 0700    Assessment:  Body systems that were not at patient's baseline Gastrointestinal, Skin, Musculoskeletal, and Safety. Focused body system assessments documented in flowsheets.        Activity     Fall Risk Score: 95   Bed alarm on? Yes     Activity Assistance Provided: assistance, 2 people      Assistive Device Utilized: walker, gait belt    Pain: denied pain, q2 rotating    Labs/RN Managed Protocols: blood sugar    Lines/Drains: PIV and NJ    Nutrition: NPO with TF at goal rate.    Goal Outcome Evaluation  Plan of Care Reviewed With: patient  Overall Patient Progress: no change  Outcome Evaluation: Pt alert, intermittently responds to some questions. VSS on RA. denied pain overnight. turn q1-2 hours. TF running at goal rate, NJ in place. incontinent void 3x, no BM on shift. arm brace CDI.  Plan is to do a repeat VFSS with SLP today.    Barriers to Discharge:   Pending discharge disposition.

## 2025-05-21 NOTE — PLAN OF CARE
Shift Hours: 0700 - 1900     Assessment:  Body systems that were not at patient's baseline Cognitive/Behavioral/Neuro, Gastrointestinal, Genitourinary, Skin, and Musculoskeletal. Focused body system assessments documented in flowsheets.        Activity              Fall Risk Score: 110              Bed alarm on? Yes     Activity Assistance Provided: assistance, 2 people      Assistive Device Utilized: walker, gait belt    Pain: denies pain     Labs/RN Managed Protocols: no RN managed labs, BG q4h stable w/ sliding scale     Lines/Drains: PIV x2 SL, NGT w/ continuous TF at goal rate     Nutrition: NPO/TF     Goal Outcome Evaluation:      Plan of Care Reviewed With: patient    Overall Patient Progress: no changeOverall Patient Progress: no change    Outcome Evaluation: No acute events this shift. Neuros unchanged and at baseline, follows commands and answering yes/no questions little to no verbal communication. Wound cares completed this morning. VFSS discontinued, plan to re-address nutrition plan w/ primary team/family. Worked w/ therapy and sat in recliner chair this afternoon. Patient able to weight shift self independently. Q1h rounding completed, call light w/ in reach.    Barriers to Discharge:   TCU placement

## 2025-05-21 NOTE — PROGRESS NOTES
[facility]    Medicine Progress Note - Hospitalist Service, GOLD TEAM 6    Date of Admission:  5/7/2025    Assessment & Plan   Isabell Matthews is a 64 year old female with a past medical history of HTN, T1DM, HLD, diabetic neuropathy, orthostatic hypotension, seizures, vascular dementia, prior CVAs, hypothyroidism, recurrent UTIs, and frequent falls. She initially presented to the ED for evaluation of stroke-like symptoms and was admitted for further monitoring and management.     Updates for 5/21/25:  - per SLP, no plan for repeat VFSS as patient unable to participate and unlikely will change plan of care with regards to nutrition  - will need to have ongoing goals of care discussion with family re: feeding tube  - awaiting TCU placement, SW following       Multifocal Acute Cerebral Infarcts  Hx of Multiple CVAs  Hx of Seizures  Pt initially presented w/ new findings of dysarthria, dysphagia, decreased responsiveness, confusion. However, while in the ED, no new focal neurologic sx were observed. Head CT negative for acute infarct or hemorrhage. CTA w/ multifocal stenosis involving L RADHA and R MCA which had progressed since 1/16/25. Stroke Neuro team consulted while in the ED, and loaded with Keppra on arrival. EEG negative. Initially, she was found to have a UTI, so there was concern that perhaps her presentation was d/t recrudescence of prior CVA deficits, however, MRI brain did show acute infarcts in R corona radiata, precentral gyrus, and operculum. Unfortunately, she was not a candidate for advanced therapies. Aside from ongoing dysarthria, no new focal neurologic deficits.   - Neuro Stroke signed off  - Keppra 250mg BID   - DAPT with ASA + Plavix x 90 days from 5/15 (then aspirin lifeliong)  - Palliative Care consulted, greatly appreciate assistance (see GOC below)  - NPO per SLP. No plan for repeat VFSS as patient unable to participate and unlikely to change  nutrition plan. Unlikely to gain significant swallow function to maintain PO nutrition. SLP plans to re-evaluate 5/22.    - Atorvastatin 40mg daily  - At discharge, will need ZioPatch x14 days mailed out  - Stroke Neuro follow-up in 8 weeks     Acute Metabolic Encephalopathy, likely multifactorial  Hx of Vascular Dementia  Acute change in status noted on arrival, possibly 2/2 acute CVA vs metabolic encephalopathy. Likely multifactorial with UTI, dehydration, hyperglycemia, hypercapnia, and hospital environment contributing, as well as newfound acute CVA as above. No significant electrolyte abnormalities. No suspicious meds. TSH 0.14. Overall stable.  - Monitor clinically  - Bedside sitter discontinued since 5/15  - Delirium precautions              - Would recommend bed inclined, lights on, blinds open and TV on during the day and then at night having all these off and bed more supine to encourage proper sleep-wake cycle  - Continue melatonin 1mg at bedtime      UTI, recurrent, resolved  Urine culture from 5/1/25 grew pan-susceptible E.Coli. Afebrile on arrival, WBC normal. No other obvious source of infection. Repeat UA/UC negative.    - Completed 7 days of IV Ceftriaxone on 5/12     Dysphagia (S/P NGT placement 5/10)  Patient failed initial swallow study. Likely in the setting of acute encephalopathy and acute stroke. SLP consulted, recommended strict NPO. NG placed 5/10, and has now begun TFs without issues. Per discussion with SLP, unlikely to have significant improvement to be able to maintain PO nutrition. This raises question regarding long term nutrition plan. Currently no desire to pursue PEG tube.  - Continue NPO per SLP  - Resumed all enteral meds on 5/13 via NGT  - Now up to goal rate of 45mL/hr for TFs via NGT, appears to be tolerating well     Hypernatremia, resolved  Free water deficit in setting of dysphagia and NPO status. Resolved with hypotonic fluids.   - Trend lytes daily      Possible Stress  Cardiomyopathy  ECHO in the ED showed LVEF 50-55% with mid ventricular hypokinesis w/ preserved apical and mid segements c/w stress CM. No obvious signs of heart failure. Clinically, she has no s/sx to suggest acutely decompensated HF.  - Continue to monitor  - Continue Carvedilol w/ hold parameters     Type 1 Diabetes Mellitus  Hypoglycemia  A1c of 8.8% in 3/2025. BG in range of 300-400 this admission. NPO due to dysphagia. Hx of labile sugars and hypoglycemia in setting of infection. Home regimen: Lantus 18 units at bedtime. Endocrine consulted to assist with insulin dosing in setting of tube feeds. Intermittent hypoglycemia, down to 40s overnight. Suspect possibly r/t kink in tubing (see above), resulting in improper administration of TFs.  - Endocrine following, appreciate assistance              - Adjusted Lantus to 8 units at 2000                 - Decrease NPH 15 units qAM + 6 units qPM              - Medium sliding scale insulin Q4H for now while NPO              - BG checks Q4H while NPO              - Hypoglycemia protocol              - PRN D10 at 60mL/hr if TFs are stopped/interrupted     Hypothyroidism  Most recent TSH 0.99. Repeat TSH here 0.14. On admission, daughter noted recent medication adjustments (brand synthroid vs generic) therefore may have been over-replaced. Cannot r/o sick euthyroid state as well.  - Continue PTA PO synthroid 88mcg   - Recheck TSH in 1-2 weeks when clinically stable     CKD stage IIIb  Baseline Cr of 1.4-1.6. Cr improved to 1.3-1.4 with IVF. Suspect chronic dehydration contributing.  - BMP daily for now  - Avoid nephrotoxic agents as best as possible     HTN  HLD  -180 on admission. Can allow for permissive hypertension in setting of acute stroke per Stroke Neuro.  - Continue PTA Carvedilol w/ hold parameters  - Continue PTA Atorvastatin w/ enteral access via NG  - Transitioned LA ASA to PO ASA 324mg daily as above  - IV hydralazine PRN for SBP>180     Chronic Low  Back Pain  - Diclofenac gel PRN  - Continue PTA Duloxetine, Gabapentin as now have enteral access     Seasonal allergies  - Hold PTA loratidine for now to reduce pill burden via NGT     Closed Colles' Fracture of L Wrist  Occurred in April, saw Ortho 4/22/25, but not deemed a surgical candidate d/t her medical comorbidities.  Per Ortho, they wanted her in brace and to be NWB status at L wrist. Case discussed with Ortho on 5/19. Repeat XR redemonstrated known comminuted intra-articular fracture of distal left radius.   - Ortho consulted  - Coffee cup weight bearing to L wrist.  - Thumb spica brace to L wrist pending ortho consult    Goals of Care  Palliative Care consulted on 5/15/25 given daughter (Vishal) expressing concern over the direction of the patient's care and what the future looks like for her.   - Greatly appreciate Palliative Care's involvement and spearheading of care conference on 5/16 (with our team, theirs, Stroke Neuro, SW, and two daughters [Felton & Vishal]). See their excellent note outlining the details regarding conversation  - In brief:              - Will stay the course w/ restorative cares and but to be DNR/DNI.    - Continue TF via NG tube, no desire for PEG          Diet: NPO for Medical/Clinical Reasons Except for: No Exceptions  Adult Formula Drip Feeding: Continuous [employer] Standard 1.4; Nasogastric tube; Goal Rate: 45 ( can start at goal ); mL/hr; Initiate at 10ml/hr and advance by 10ml Q8H as tolerated.; Do not advance tube feeding rate unless K+ is = or > 3.0, Mg++...    DVT Prophylaxis: Pneumatic Compression Devices  Parker Catheter: Not present  Lines: None     Cardiac Monitoring: None  Code Status: No CPR- Do NOT Intubate      Clinically Significant Risk Factors               # Hypoalbuminemia: Lowest albumin = 2.9 g/dL at 5/17/2025  6:01 AM, will monitor as appropriate     # Hypertension: Noted on problem list     # Dementia: noted on problem list         # Severe  Malnutrition: based on nutrition assessment and treatment provided per dietitian's recommendations.    # Financial/Environmental Concerns:           Social Drivers of Health    Food Insecurity: Unknown (5/17/2025)    Food Insecurity     Within the past 12 months, did you worry that your food would run out before you got money to buy more?: Patient unable to answer     Within the past 12 months, did the food you bought just not last and you didn t have money to get more?: Patient unable to answer   Depression: At risk (4/2/2025)    PHQ-2     PHQ-2 Score: 4   Housing Stability: Unknown (5/17/2025)    Housing Stability     Do you have housing? : Patient unable to answer     Are you worried about losing your housing?: Patient unable to answer   Tobacco Use: Medium Risk (4/24/2025)    Patient History     Smoking Tobacco Use: Former     Smokeless Tobacco Use: Never   Financial Resource Strain: Unknown (5/17/2025)    Financial Resource Strain     Within the past 12 months, have you or your family members you live with been unable to get utilities (heat, electricity) when it was really needed?: Patient unable to answer   Transportation Needs: Unknown (5/17/2025)    Transportation Needs     Within the past 12 months, has lack of transportation kept you from medical appointments, getting your medicines, non-medical meetings or appointments, work, or from getting things that you need?: Patient unable to answer   Interpersonal Safety: Unknown (5/17/2025)    Interpersonal Safety     Do you feel physically and emotionally safe where you currently live?: Patient unable to answer     Within the past 12 months, have you been hit, slapped, kicked or otherwise physically hurt by someone?: Patient unable to answer     Within the past 12 months, have you been humiliated or emotionally abused in other ways by your partner or ex-partner?: Patient unable to answer          Disposition Plan     Medically Ready for Discharge: Anticipated in  2-4 Days           The patient's care was discussed with the Attending Physician, Dr. Palomares.    Javier Johnson PA-C  Hospitalist Service, GOLD TEAM 6  [facility]  Securely message with [employer] (more info)  Text page via [facility] Paging/Directory   See signed in provider for up to date coverage information  ______________________________________________________________________    Interval History   Awake but nonverbal and non-responsive to questions. Follows commands on exam.     Physical Exam   Vital Signs: Temp: 98.4  F (36.9  C) Temp src: Oral BP: (!) 160/59 Pulse: 81   Resp: 16 SpO2: 99 % O2 Device: None (Room air)    Weight: 134 lbs 14.74 oz    General Appearance:  Awake. Alert. NAD.   HEENT:  Unremarkable.   Respiratory:  Normal effort. CTAB. No wheezing, rhonchi, rales.   Cardiovascular:  RRR. S1/S2. No murmurs.   GI:  Soft, non-distended, non-tender, +BS.   Extremity:  Trace pitting edema BLE  Skin:  No visible rash.   Neuro:  Grossly non-focal. ? Left facial droop.     Medical Decision Making       40 MINUTES SPENT BY ME on the date of service doing chart review, history, exam, documentation & further activities per the note.      Data     I have personally reviewed the following data over the past 24 hrs:    5.3  \   9.6 (L)   / 214     139 104 30.4 (H) /  199 (H)   4.6 25 1.45 (H) \     ALT: 26 AST: 39 AP: 251 (H) TBILI: 0.2   ALB: 3.1 (L) TOT PROTEIN: 5.7 (L) LIPASE: N/A       Imaging results reviewed over the past 24 hrs:   No results found for this or any previous visit (from the past 24 hours).

## 2025-05-22 ENCOUNTER — APPOINTMENT (OUTPATIENT)
Dept: PHYSICAL THERAPY | Facility: CLINIC | Age: 65
End: 2025-05-22
Payer: COMMERCIAL

## 2025-05-22 ENCOUNTER — APPOINTMENT (OUTPATIENT)
Dept: SPEECH THERAPY | Facility: CLINIC | Age: 65
DRG: 064 | End: 2025-05-22
Payer: COMMERCIAL

## 2025-05-22 ENCOUNTER — APPOINTMENT (OUTPATIENT)
Dept: OCCUPATIONAL THERAPY | Facility: CLINIC | Age: 65
DRG: 064 | End: 2025-05-22
Payer: COMMERCIAL

## 2025-05-22 VITALS
TEMPERATURE: 98 F | BODY MASS INDEX: 23.63 KG/M2 | WEIGHT: 133.38 LBS | OXYGEN SATURATION: 94 % | HEART RATE: 78 BPM | RESPIRATION RATE: 16 BRPM | DIASTOLIC BLOOD PRESSURE: 66 MMHG | SYSTOLIC BLOOD PRESSURE: 130 MMHG

## 2025-05-22 LAB
GLUCOSE BLDC GLUCOMTR-MCNC: 106 MG/DL (ref 70–99)
GLUCOSE BLDC GLUCOMTR-MCNC: 132 MG/DL (ref 70–99)
GLUCOSE BLDC GLUCOMTR-MCNC: 137 MG/DL (ref 70–99)
GLUCOSE BLDC GLUCOMTR-MCNC: 140 MG/DL (ref 70–99)
GLUCOSE BLDC GLUCOMTR-MCNC: 160 MG/DL (ref 70–99)
GLUCOSE BLDC GLUCOMTR-MCNC: 169 MG/DL (ref 70–99)
GLUCOSE BLDC GLUCOMTR-MCNC: 197 MG/DL (ref 70–99)
GLUCOSE BLDC GLUCOMTR-MCNC: 249 MG/DL (ref 70–99)
GLUCOSE BLDC GLUCOMTR-MCNC: 69 MG/DL (ref 70–99)

## 2025-05-22 PROCEDURE — 97530 THERAPEUTIC ACTIVITIES: CPT | Mod: GP | Performed by: PHYSICAL THERAPIST

## 2025-05-22 PROCEDURE — 250N000011 HC RX IP 250 OP 636

## 2025-05-22 PROCEDURE — 250N000013 HC RX MED GY IP 250 OP 250 PS 637

## 2025-05-22 PROCEDURE — 258N000001 HC RX 258

## 2025-05-22 PROCEDURE — 250N000009 HC RX 250: Performed by: STUDENT IN AN ORGANIZED HEALTH CARE EDUCATION/TRAINING PROGRAM

## 2025-05-22 PROCEDURE — G0463 HOSPITAL OUTPT CLINIC VISIT: HCPCS

## 2025-05-22 PROCEDURE — 99232 SBSQ HOSP IP/OBS MODERATE 35: CPT | Mod: 24 | Performed by: NURSE PRACTITIONER

## 2025-05-22 PROCEDURE — 120N000002 HC R&B MED SURG/OB UMMC

## 2025-05-22 PROCEDURE — 92526 ORAL FUNCTION THERAPY: CPT | Mod: GN

## 2025-05-22 PROCEDURE — 97116 GAIT TRAINING THERAPY: CPT | Mod: GP | Performed by: PHYSICAL THERAPIST

## 2025-05-22 PROCEDURE — 250N000013 HC RX MED GY IP 250 OP 250 PS 637: Performed by: STUDENT IN AN ORGANIZED HEALTH CARE EDUCATION/TRAINING PROGRAM

## 2025-05-22 PROCEDURE — 97535 SELF CARE MNGMENT TRAINING: CPT | Mod: GO

## 2025-05-22 RX ORDER — OXYMETAZOLINE HYDROCHLORIDE 0.05 G/100ML
2 SPRAY NASAL 2 TIMES DAILY PRN
Status: DISCONTINUED | OUTPATIENT
Start: 2025-05-22 | End: 2025-06-12 | Stop reason: HOSPADM

## 2025-05-22 RX ADMIN — MICONAZOLE NITRATE: 20 CREAM TOPICAL at 19:46

## 2025-05-22 RX ADMIN — ASPIRIN 81 MG CHEWABLE TABLET 324 MG: 81 TABLET CHEWABLE at 09:41

## 2025-05-22 RX ADMIN — Medication 1000 MCG: at 09:40

## 2025-05-22 RX ADMIN — GABAPENTIN 100 MG: 100 CAPSULE ORAL at 21:00

## 2025-05-22 RX ADMIN — CARVEDILOL 6.25 MG: 3.12 TABLET, FILM COATED ORAL at 09:41

## 2025-05-22 RX ADMIN — LEVETIRACETAM 250 MG: 100 SOLUTION ORAL at 20:59

## 2025-05-22 RX ADMIN — HEPARIN SODIUM 5000 UNITS: 5000 INJECTION, SOLUTION INTRAVENOUS; SUBCUTANEOUS at 05:55

## 2025-05-22 RX ADMIN — LEVOTHYROXINE SODIUM 88 MCG: 88 TABLET ORAL at 09:40

## 2025-05-22 RX ADMIN — Medication 20 MG: at 09:41

## 2025-05-22 RX ADMIN — INSULIN ASPART 2 UNITS: 100 INJECTION, SOLUTION INTRAVENOUS; SUBCUTANEOUS at 16:28

## 2025-05-22 RX ADMIN — Medication 15 ML: at 09:41

## 2025-05-22 RX ADMIN — MICONAZOLE NITRATE: 20 CREAM TOPICAL at 09:42

## 2025-05-22 RX ADMIN — CLOPIDOGREL BISULFATE 75 MG: 75 TABLET, FILM COATED ORAL at 09:41

## 2025-05-22 RX ADMIN — CARVEDILOL 6.25 MG: 3.12 TABLET, FILM COATED ORAL at 18:04

## 2025-05-22 RX ADMIN — LEVETIRACETAM 250 MG: 100 SOLUTION ORAL at 09:40

## 2025-05-22 RX ADMIN — HEPARIN SODIUM 5000 UNITS: 5000 INJECTION, SOLUTION INTRAVENOUS; SUBCUTANEOUS at 21:00

## 2025-05-22 RX ADMIN — DEXTROSE MONOHYDRATE 25 ML: 25 INJECTION, SOLUTION INTRAVENOUS at 04:23

## 2025-05-22 RX ADMIN — ATORVASTATIN CALCIUM 40 MG: 40 TABLET, FILM COATED ORAL at 09:41

## 2025-05-22 RX ADMIN — DICLOFENAC SODIUM 2 G: 10 GEL TOPICAL at 12:15

## 2025-05-22 RX ADMIN — DICLOFENAC SODIUM 2 G: 10 GEL TOPICAL at 19:46

## 2025-05-22 RX ADMIN — Medication 1 MG: at 20:59

## 2025-05-22 RX ADMIN — DICLOFENAC SODIUM 2 G: 10 GEL TOPICAL at 09:40

## 2025-05-22 RX ADMIN — INSULIN ASPART 3 UNITS: 100 INJECTION, SOLUTION INTRAVENOUS; SUBCUTANEOUS at 12:15

## 2025-05-22 ASSESSMENT — ACTIVITIES OF DAILY LIVING (ADL)
ADLS_ACUITY_SCORE: 94
ADLS_ACUITY_SCORE: 98
ADLS_ACUITY_SCORE: 94
ADLS_ACUITY_SCORE: 93
ADLS_ACUITY_SCORE: 94
ADLS_ACUITY_SCORE: 93
ADLS_ACUITY_SCORE: 94
ADLS_ACUITY_SCORE: 94
ADLS_ACUITY_SCORE: 93
ADLS_ACUITY_SCORE: 94
ADLS_ACUITY_SCORE: 98
ADLS_ACUITY_SCORE: 94
ADLS_ACUITY_SCORE: 93
ADLS_ACUITY_SCORE: 98
ADLS_ACUITY_SCORE: 94

## 2025-05-22 NOTE — PLAN OF CARE
Goal Outcome Evaluation:    Shift Hours: 1900 - 0700    Assessment:  Body systems that were not at patient's baseline Gastrointestinal. Focused body system assessments documented in flowsheets.        Activity     Fall Risk Score: 110   Bed alarm on? Yes     Activity Assistance Provided: assistance, 2 people      Assistive Device Utilized: walker, gait belt    Pain: None per pt    Labs/RN Managed Protocols: BG Q4    Lines/Drains: NJ, 2 PIV SL    Nutrition: TF, NPO    Goal Outcome Evaluation    Plan of Care Reviewed With: patient  Overall Patient Progress: no change  Outcome Evaluation: Patient rested intermittently. Opens eyes and reponds with yes and no or shakes head. Follows simple nursing commands. Turns well in bed, pillows repositioned Q2. TF continued through britled NJ. NPO. Cream applied to bottom per order. BG monitored and covered as needed per order. 69 at 0400 check, treated with D50 25mL, rechecks 169 and 160. Bed alarm on. Incontinent, no BM this shift. VSS. RA. CPOC.    Barriers to Discharge:     Continuing TCU and ongoing discussions with family regarding discharge plan.

## 2025-05-22 NOTE — PROGRESS NOTES
[facility]  WO Nurse Inpatient Assessment     Consulted for: sacrum, perineum    WOC nurse follow-up plan: signing off    Patient History (according to provider note(s):      64 year old female with a past medical history of HTN, T1DM, HLD, diabetic neuropathy, orthostatic hypotension, seizures, vascular dementia, prior CVAs, hypothyroidism, recurrent UTIs, and frequent falls. She initially presented to the ED for evaluation of stroke-like symptoms and was admitted for further monitoring and management.     Assessment:      Areas visualized during today's visit: Focused:, Perineal area, and Sacrum/coccyx    Skin Injury Location: perineum, perianal area    5/15 & 5/22  Skin injury due to: Fungal rash  and Incontinence associated dermatitis (IAD)  Skin history and plan of care:   patient with dual incontinence, red raised rash with satellite lesion, suspect yeast. Extends across perineal area to posterior intergluteal and perianal area. RN to order ROWENA pump for additional moisture management.   Affected area:      Skin assessment: Erythema, Lesions-satellite, and Rash     Measurements (length x width x depth, in cm) see media     Color: normal and consistent with surrounding tissue     Temperature  normal      Drainage: none .      Color: none      Odor: mild  Pain: absent, none  Pain interventions prior to dressing change: slow and gentle cares   Treatment goal: Infection control/prevention, Decrease moisture, Maintain (prevention of deterioration), Protection, and Simplify plan of care  STATUS: healing  Supplies ordered: at bedside, discussed with RN, and discussed with patient       Treatment Plan:     Perineal, perianal : BID and PRN  Cleanse the area with Chris cleanse and protect, very gently with soft cloth.  Apply thin layer of Chris Antifungal paste (see EMAR)  on top of it.  With repeat application, do not scrub the paste, only remove soiled paste and reapply.  If  complete removal of paste is necessary use baby oil/mineral oil (#752164) and soft wash cloth.  Ensure pt has Francis-cushion (#[id number]) while sitting up in the chair.  Use only one Covidien pad in between mattress and pt.   No brief while in bed.    Add ROWENA pump to isoflex surface    Orders: Reviewed    RECOMMEND PRIMARY TEAM ORDER: None, at this time  Education provided: importance of repositioning, plan of care, wound progress, Infection prevention , Moisture management, Hygiene, and Off-loading pressure  Discussed plan of care with: Patient and Nurse  Notify [facility] if wound(s) deteriorate.  Nursing to notify the Provider(s) and re-consult the [facility] Nurse if new skin concern.    DATA:     Current support surface: Standard  Low air loss (ROWENA pump, Isolibrium, Pulsate)  Containment of urine/stool: Incontinence Protocol and Incontinent pad in bed  BMI: Body mass index is 23.63 kg/m .   Active diet order: Orders Placed This Encounter      NPO for Medical/Clinical Reasons Except for: No Exceptions     Output: I/O last 3 completed shifts:  In: 1310 [NG/GT:275]  Out: -      Labs:   Recent Labs   Lab 05/21/25  0551   ALBUMIN 3.1*   HGB 9.6*   WBC 5.3     Pressure injury risk assessment:   Sensory Perception: 3-->slightly limited  Moisture: 3-->occasionally moist  Activity: 2-->chairfast  Mobility: 2-->very limited  Nutrition: 3-->adequate  Friction and Shear: 2-->potential problem  Dayron Score: 15    Abigail Erickson RN, CWOCN  Pager no longer is use, please contact through [employer] group: [facility] Nurse [place]  Dept. Office Number: [phone]

## 2025-05-22 NOTE — PLAN OF CARE
Shift Hours: 0700 - 1900     Assessment:  Body systems that were not at patient's baseline Gastrointestinal, Genitourinary, Skin, and Musculoskeletal. Focused body system assessments documented in flowsheets.        Activity              Fall Risk Score: 110              Bed alarm on? Yes     Activity Assistance Provided: assistance, 2 people      Assistive Device Utilized: walker, gait belt    Pain: denies pain    Labs/RN Managed Protocols: no RN managed labs, CP-CRE swab sent, BG q4h stable     Lines/Drains: PIV x2, NGT w/ continuous TF at goal    Nutrition: TF, diet advanced to pureed this evening w/ 1:1 supervision.     Goal Outcome Evaluation:      Plan of Care Reviewed With: patient, family    Overall Patient Progress: no changeOverall Patient Progress: no change    Outcome Evaluation: No acute events this shift. Neuros remain at baseline, patient answering yes/no question and following commands appropriately, no verbal communication. Wound cares completed this morning. Worked w/ therapys this shift. Up in recliner x2. Patient had a bloody nose this afternoon, prn afrin added but stopped prior to med being ready. often repositioning/moving self in bed independently. Q1h rounding completed, bed alarm on and call light w/in reach    Barriers to Discharge:   Ongoing POC, GOC conference tomorrow at 1400

## 2025-05-22 NOTE — PROGRESS NOTES
[facility]    Medicine Progress Note - Hospitalist Service, GOLD TEAM 6    Date of Admission:  5/7/2025    Assessment & Plan   Isabell Matthews is a 64 year old female with a past medical history of HTN, T1DM, HLD, diabetic neuropathy, orthostatic hypotension, seizures, vascular dementia, prior CVAs, hypothyroidism, recurrent UTIs, and frequent falls. She initially presented to the ED for evaluation of stroke-like symptoms and was admitted for further monitoring and management.     Updates for 5/22/2025:  - Start pureed diet with 1:1 supervision per SLP  - Cognition appears to be bigger limiting factor than swallow function at this time  - SLP doubtful that patient will be able to meet nutrition needs with PO intake alone  - Will have another care conference on 5/23 to discuss with family  - Continue TF via NG tube for now       Multifocal Acute Cerebral Infarcts  Hx of Multiple CVAs  Hx of Seizures  Pt initially presented w/ new findings of dysarthria, dysphagia, decreased responsiveness, confusion. However, while in the ED, no new focal neurologic sx were observed. Head CT negative for acute infarct or hemorrhage. CTA w/ multifocal stenosis involving L RADHA and R MCA which had progressed since 1/16/25. Stroke Neuro team consulted while in the ED, and loaded with Keppra on arrival. EEG negative. Initially, she was found to have a UTI, so there was concern that perhaps her presentation was d/t recrudescence of prior CVA deficits, however, MRI brain did show acute infarcts in R corona radiata, precentral gyrus, and operculum. Unfortunately, she was not a candidate for advanced therapies. Aside from ongoing dysarthria, no new focal neurologic deficits.   - Neuro Stroke signed off  - Keppra 250mg BID   - DAPT with ASA + Plavix x 90 days from 5/15 (then aspirin lifeliong)  - Palliative Care consulted, greatly appreciate assistance (see GOC below)    - Atorvastatin 40mg daily  -  At discharge, will need ZioPatch x14 days mailed out (if still within goal of care)  - Stroke Neuro follow-up in 8 weeks     Acute Metabolic Encephalopathy, likely multifactorial  Hx of Vascular Dementia  Acute change in status noted on arrival, possibly 2/2 acute CVA vs metabolic encephalopathy. Likely multifactorial with UTI, dehydration, hyperglycemia, hypercapnia, and hospital environment contributing, as well as newfound acute CVA as above. No significant electrolyte abnormalities. No suspicious meds. TSH 0.14. Overall stable. Patient no longer appears somnolent or encephalopathic, although her cognitive function does not appear intact.  Difficult to say how far from baseline she is.  - Monitor clinically  - Bedside sitter discontinued since 5/15  - Delirium precautions              - Would recommend bed inclined, lights on, blinds open and TV on during the day and then at night having all these off and bed more supine to encourage proper sleep-wake cycle  - Continue melatonin 1mg at bedtime      UTI, recurrent, resolved  Urine culture from 5/1/25 grew pan-susceptible E.Coli. Afebrile on arrival, WBC normal. No other obvious source of infection. Repeat UA/UC negative.    - Completed 7 days of IV Ceftriaxone on 5/12     Dysphagia (S/P NGT placement 5/10)  Patient failed initial swallow study. Likely in the setting of acute encephalopathy and acute stroke. SLP consulted, recommended strict NPO. NG placed 5/10, and has now begun TFs without issues. Currently no desire to pursue PEG per goals of care discussion. NG tube feeds more of a temporary measure to see if swallowing will improve. SLP following. No plan for repeat VFSS as patient unable to participate from cognitive standpoint. No actual swallow concerns. SLP re-evaluated today, however patient retained food/liquids in mouth for ~10 minutes despite multiple cues to swallow. When patient eventually swallows there is no dysphagia or aspiration. She is at  increased risk for aspiration due to oral retention, otherwise swallow function appears to be intact. She would not be a candidate for further SLP interventions or exercises.  - Will set up care conference with family to discuss SLP recommendations, as this appears to be more of a cognitive issue  - OK to start pureed diet with thin liquids and 1:1 supervision  - Could consider calorie counts depending on amount of meaningful intake  - Continue TF at goal rate of 45mL/hr via NGT, will discuss plan for ongoing TF with family tomorrow     Hypernatremia, resolved  Free water deficit in setting of dysphagia and NPO status. Resolved with hypotonic fluids.   - Trend lytes daily      Possible Stress Cardiomyopathy  ECHO in the ED showed LVEF 50-55% with mid ventricular hypokinesis w/ preserved apical and mid segements c/w stress CM. No obvious signs of heart failure. Clinically, she has no s/sx to suggest acutely decompensated HF.  - Continue to monitor  - Continue Carvedilol w/ hold parameters     Type 1 Diabetes Mellitus  Hypoglycemia  A1c of 8.8% in 3/2025. BG in range of 300-400 this admission. NPO due to dysphagia. Hx of labile sugars and hypoglycemia in setting of infection. Home regimen: Lantus 18 units at bedtime. Endocrine consulted to assist with insulin dosing in setting of tube feeds. Overall BG well controlled. Does have intermittent hypoglycemia, but this has overall improved with adjustments to insulin regimen.  - Endocrine following, appreciate assistance              - Lantus to 8 units at 2000                 - NPH 15 units qAM + 6 units qPM              - Medium sliding scale insulin Q4H for now while NPO              - BG checks Q4H while NPO              - Hypoglycemia protocol              - PRN D10 at 60mL/hr if TFs are stopped/interrupted   - Currently no need to adjust insulin for pureed diet as not anticipating significant intake with this.     Hypothyroidism  Most recent TSH 0.99. Repeat TSH here  0.14. On admission, daughter noted recent medication adjustments (brand synthroid vs generic) therefore may have been over-replaced. Cannot r/o sick euthyroid state as well.  - Continue PTA PO synthroid 88mcg   - Recheck TSH in 1-2 weeks when clinically stable     CKD stage IIIb  Baseline Cr of 1.4-1.6. Cr improved to 1.3-1.4 with IVF. Suspect chronic dehydration contributing.  - BMP daily for now  - Avoid nephrotoxic agents as best as possible     HTN  HLD  -180 on admission. Can allow for permissive hypertension in setting of acute stroke per Stroke Neuro.  - Continue PTA Carvedilol w/ hold parameters  - Continue PTA Atorvastatin w/ enteral access via NG  - Transitioned UT ASA to PO ASA 324mg daily as above  - IV hydralazine PRN for SBP>180     Chronic Low Back Pain  - Diclofenac gel PRN  - Continue PTA Duloxetine, Gabapentin as now have enteral access     Seasonal allergies  - Hold PTA loratidine for now to reduce pill burden via NGT     Closed Colles' Fracture of L Wrist  Occurred in April, saw Ortho 4/22/25, but not deemed a surgical candidate d/t her medical comorbidities.  Per Ortho, they wanted her in brace and to be NWB status at L wrist. Case discussed with Ortho on 5/19. Repeat XR redemonstrated known comminuted intra-articular fracture of distal left radius.   - Ortho consulted  - Coffee cup weight bearing to L wrist.  - Thumb spica brace to L wrist pending ortho consult    Goals of Care  Palliative Care consulted on 5/15/25 given daughter (Vishal) expressing concern over the direction of the patient's care and what the future looks like for her.   - Greatly appreciate Palliative Care's involvement and spearheading of care conference on 5/16 (with our team, theirs, Stroke Neuro, SW, and two daughters [Felton & Vishal]). See their excellent note outlining the details regarding conversation  - In brief:              - Will stay the course w/ restorative cares and but to be DNR/DNI.    - Continue  TF via NG tube, no desire for PEG   - SW consulted for placement   - Will have care conference tomorrow to discuss SLP recs (see above)          Diet: Adult Formula Drip Feeding: Continuous Dailymotion Standard 1.4; Nasogastric tube; Goal Rate: 45 ( can start at goal ); mL/hr; Initiate at 10ml/hr and advance by 10ml Q8H as tolerated.; Do not advance tube feeding rate unless K+ is = or > 3.0, Mg++...  Pureed Diet (level 4) Thin Liquids (level 0)    DVT Prophylaxis: Pneumatic Compression Devices  Parker Catheter: Not present  Lines: None     Cardiac Monitoring: None  Code Status: No CPR- Do NOT Intubate      Clinically Significant Risk Factors               # Hypoalbuminemia: Lowest albumin = 2.9 g/dL at 5/17/2025  6:01 AM, will monitor as appropriate     # Hypertension: Noted on problem list     # Dementia: noted on problem list         # Severe Malnutrition: based on nutrition assessment and treatment provided per dietitian's recommendations.    # Financial/Environmental Concerns:           Social Drivers of Health    Food Insecurity: Unknown (5/17/2025)    Food Insecurity     Within the past 12 months, did you worry that your food would run out before you got money to buy more?: Patient unable to answer     Within the past 12 months, did the food you bought just not last and you didn t have money to get more?: Patient unable to answer   Depression: At risk (4/2/2025)    PHQ-2     PHQ-2 Score: 4   Housing Stability: Unknown (5/17/2025)    Housing Stability     Do you have housing? : Patient unable to answer     Are you worried about losing your housing?: Patient unable to answer   Tobacco Use: Medium Risk (4/24/2025)    Patient History     Smoking Tobacco Use: Former     Smokeless Tobacco Use: Never   Financial Resource Strain: Unknown (5/17/2025)    Financial Resource Strain     Within the past 12 months, have you or your family members you live with been unable to get utilities (heat, electricity) when it was really  needed?: Patient unable to answer   Transportation Needs: Unknown (5/17/2025)    Transportation Needs     Within the past 12 months, has lack of transportation kept you from medical appointments, getting your medicines, non-medical meetings or appointments, work, or from getting things that you need?: Patient unable to answer   Interpersonal Safety: Unknown (5/17/2025)    Interpersonal Safety     Do you feel physically and emotionally safe where you currently live?: Patient unable to answer     Within the past 12 months, have you been hit, slapped, kicked or otherwise physically hurt by someone?: Patient unable to answer     Within the past 12 months, have you been humiliated or emotionally abused in other ways by your partner or ex-partner?: Patient unable to answer          Disposition Plan     Medically Ready for Discharge: Anticipated in 2-4 Days           The patient's care was discussed with the Attending Physician, Dr. Palomares.    Javier Johnson PA-C  Hospitalist Service, [address]  Securely message with Vocera (more info)  Text page via [facility] Paging/Directory   See signed in provider for up to date coverage information  ______________________________________________________________________    Interval History   No acute events overnight. Awake and alert. Nonverbal. Does not respond to questioning.    Physical Exam   Vital Signs: Temp: 98.4  F (36.9  C) Temp src: Oral BP: 120/70 Pulse: 80   Resp: 15 SpO2: 99 % O2 Device: None (Room air)    Weight: 133 lbs 6.05 oz    General Appearance:  Awake. Alert. NAD.   HEENT:  Unremarkable.   Respiratory:  Normal effort. CTAB. No wheezing, rhonchi, rales.   Cardiovascular:  RRR. S1/S2. No murmurs.   GI:  Soft, non-distended, non-tender, +BS.   Extremity:  Trace pitting edema BLE  Skin:  No visible rash.   Neuro:  Grossly non-focal. ? Left facial droop.     Medical Decision Making       70 MINUTES SPENT BY ME  on the date of service doing chart review, history, exam, documentation & further activities per the note.      Data         Imaging results reviewed over the past 24 hrs:   No results found for this or any previous visit (from the past 24 hours).

## 2025-05-22 NOTE — PROGRESS NOTES
Inpatient Diabetes Management Service: Daily Progress Note     HPI: Isabell Matthews is a 64 year old female with history of  T1DM with diabetic neuropathy, CKD3b, HTN, HLD, orthostatic hypotension, seizures, vascular dementia, hypothyroidism, recurrent UTI's, and frequent falls who was admitted to [facility] 5/7/2025 with concern for stroke after presenting with acute neurologic symptoms. Hospital course has been complicated by labile BG and dysphagia, she continues to be NPO and continues to be on continuous TF.     IDS consulted to assist with glycemic management as well as optimization while inpatient and when applicable, recommendations for transition home.         Assessment/Plan:     Assessment:  Type 1 Diabetes Mellitus c/b peripheral neuropathy, nephropathy, Sub-optimal control. (A1c 8.8 % 3/23/25) in presence of complex illness and anemia.   Vascular dementia with acute cerebral infarcts new findings of dysarthria, dysphagia, decreased responsiveness, confusion   Labile BG 2/2 Tube feedings  BMI: 22    Plan/Recommendations:    ** patient has T1DM - requires insulin, basal dose must not to be withheld entirely OR for prolonged periods, high risk for DKA. If trending >250 mg/dL longer than 4 hours, please draw appropriate labs to determine if DKA and treat accordingly **   - Lantus 8 units q 24 hrs at 1800 [DKA protective dose]  - NPH 15 unit(s) at 0900 with tube feeding (Coinfloor 1.4 at 45 ml/hr). Hold if TF held.  - Reduce to NPH 6 unit(s) at 2100 with tube feeding (Coinfloor 1.4 at 45 ml/hr). Hold if TF held.   - Novolog Correction Scale: medium resistance (ISF: 50) every 4 hours   - BG checks q4hr  - PRN D10 at 50 ml/hr if tube feeds are stopped/interrupted (dosed Coinfloor Standard 1.4 @ goal 45 mL/hr) dosing provides 75% of CHO that would have been given by TF   - Hypoglycemia protocol    - Carb counting protocol     Per ADA guidelines, treatment goals and recommendations for older  adults with DM and medically complexity is less stringent BG control/A1c for safety - targets of 110-180 mg/dL and up to 250 mg/dL reasonable - albeit not persistent for those with T1DM. Avoid reliance on A1c. Glucose decisions should be based on avoidance of hypoglycemia and symptomatic hyperglycemia.         Discussion: Remains NPO on continuous TF.  Likely to go to bolus TF in the future.    Low BG to 69 mg/dL over the noc, will reduce back the PM NPH.   Jean Carlos in room today - reviewed plan. No new questions or concerns.   Isabell opening eyes and nodding yes/no to questions.           Discharge Planning: (tentative) --> likely TCU at discharge   Medications: TBD  - adjustment of PTA dosing with addition of NPH to cover tube feeds as indicated   Needs set doses for day program, would be able to use ICR at home.   Test Claims:  none needed.   Education:  Needs to be assessed closer to discharge.    Outpatient Follow-up: PCP- Dr Kellogg ( LOV 4/24/25), until able to establish with [facility]- has appointment with Mai Figueroa 8/4/25    Please notify Inpatient Diabetes Service if changes are planned to steroids, nutrition, TPN/TF and anticipated procedures requiring prolonged NPO status.         Interval History/Review of Systems :   - The last 24 hours progress and nursing notes reviewed.    - in bed - eyes opening to  questions. Still largely minimally interactive    Inpatient Glucose Control:         Planned Procedures/Surgeries: none    Recent Labs   Lab 05/22/25  0453 05/22/25  0435 05/22/25  0418 05/22/25  0021 05/21/25  2229 05/21/25  2021   * 169* 69* 137* 133* 157*           Medications Impacting Glycemia:   Steroids: none  D5W containing solutions/medications: none   Other medications impacting glucose: none        Nutrition:   Orders Placed This Encounter      NPO for Medical/Clinical Reasons Except for: No Exceptions  Supplements:  none  TPN: none   TF:   5/13 - 5/17: been at Osmolite 1.5  goal of 40 ml/hr since 5/13, provides 195 g cho in 24hrs  5/17 - current: changed to continuous Zeel Standard 1.4 @ goal 45 mL/hr provides 170 g cho in 24 hrs         Diabetes History: see full consult note for complete diabetes history   Diabetes Type and Duration: DM for > 40 years, diagnosed in her 40s. Per daughter,  initially told she has T2DM then T1DM for a while then flipped back to being told T2DM.      6/17/2024 positive GAD65 antibody and c-peptide <0.1 but BG elevated     PTA Medication Regimen:   From discharge 3/30/25:- daughter not present to confirm   - Lantus 18 units once daily at HS  - Lispro ICR 1:15 TID AC, 1:20 PRN with snacks/supplements  - Lispro correction 1:50>150 TID AC, >200 HS     At discharge had discussed challenges around glucose lability and dosing: Lantus dose of 18 units may be too high if patient is not eating. However, daughter notes that with more dramatic changes patient will sometimes rise to 400s and has been in DKA before which is hard to balance. If she is concerned about mom going low at night, will try to give her a snack to support Lantus dosing. She is working to resume CGM to more closely monitor blood sugars.  Missing doses?: unknown, daughter not present to confirm   Historical Diabetes Medications: various insulins and dosing      Glucose monitoring device/frequency/trends: Historically has used glucometer before meals and at bedtime. Has tried and failed multiple CGMs (issues with accuracy or patient picking CGM off).   Current trends unknown as daughter not present to discuss- historically known to be labile with needs dropping dramatically when NPO    Hypoglycemia PTA:   - Frequency: currently unknown, but historically labile.   - Severity: + history of severe unconscious lows   - Awareness: variable, not intact    - Treatment: candy, juice       Outpatient Diabetes Provider: Current PCP: Dr Kellogg ( LOV 4/24/25) ; seen by Dr Bony Castaneda in the past  (LOV 10/7/24)   Endo referral placed by PCP and IDS last admission, Scheduled with Mai Figueroa, PAC 8/4/25   Formal Diabetes Education/Educator: none recently          Physical Exam:   BP (!) 147/71 (Patient Position: Left side, Cuff Size: Adult Regular)   Pulse 75   Temp 98.3  F (36.8  C) (Oral)   Resp 15   Wt 61.2 kg (134 lb 14.7 oz)   SpO2 98%   BMI 23.91 kg/m    General:  stable appearing, NAD, resting comfortably in bed - opening eyes to voice today. TF on at goal.  Jean Carlos in room   Lungs: unlabored breathing on RA.  Psych:   calm         Data:     Lab Results   Component Value Date    A1C 8.8 (H) 03/23/2025    A1C 9.6 (H) 01/16/2025    A1C 11.2 (H) 09/23/2024    A1C 10.2 (H) 05/20/2024    A1C 10.7 (H) 01/29/2024    A1C 7.8 (H) 06/21/2021    A1C 11.7 (H) 09/27/2020     ROUTINE IP LABS (Last four results)  BMP  Recent Labs   Lab 05/22/25  0453 05/22/25  0435 05/22/25  0418 05/22/25  0021 05/21/25  0922 05/21/25  0551 05/19/25  0952 05/19/25  0620 05/17/25  0803 05/17/25  0601   NA  --   --   --   --   --  139  --  141  --  140   POTASSIUM  --   --   --   --   --  4.6  --  4.8  --  4.7   CHLORIDE  --   --   --   --   --  104  --  107  --  103   VERONICA  --   --   --   --   --  8.5*  --  8.6*  --  8.4*   CO2  --   --   --   --   --  25  --  24  --  25   BUN  --   --   --   --   --  30.4*  --  29.4*  --  26.1*   CR  --   --   --   --   --  1.45*  --  1.50*  --  1.41*   * 169* 69* 137*   < > 210*   < > 120*   < > 322*    < > = values in this interval not displayed.     CBC  Recent Labs   Lab 05/21/25  0551 05/19/25  0620 05/17/25  0601   WBC 5.3 6.5 5.7   RBC 3.52* 3.65* 3.60*   HGB 9.6* 9.9* 9.5*   HCT 30.7* 31.7* 31.1*   MCV 87 87 86   MCH 27.3 27.1 26.4*   MCHC 31.3* 31.2* 30.5*   RDW 15.8* 15.8* 15.3*    220 194     Inpatient Diabetes Service will continue to follow, please don't hesitate to contact the team with any questions or concerns.     Aster Brownlee, APRN-CNP, BC-ADM   Inpatient  Diabetes Service  Available on [employer] - when on duty.     To contact Inpatient Diabetes Service:     7 AM - 5 PM: Page the IDS BULMARO following the patient that day (see filed or incomplete progress notes/consult notes under Endocrinology)    OR if uncertain of provider assignment: page job code [phone]    5 PM - 7 AM: First call after hours is to primary service.    For urgent after-hours questions, page job code for on call fellow: [phone]     I spent a total of 25 minutes on the date of the encounter doing prep/post-work, chart review, history and exam, documentation and further activities per the note including lab review, multidisciplinary communication, counseling the patient and/or coordinating care regarding acute hyper/hypoglycemic management, as well as discharge management and planning/communication.  See note for details.      Please notify inpatient diabetes service if changes are planned to steroids, nutrition, or if procedures are planned requiring prolonged NPO status.Diabetes Management Team job code: [phone]     4

## 2025-05-22 NOTE — PROGRESS NOTES
"Care Management Follow Up    Length of Stay (days): 15  Expected Discharge Date: 05/23/2025  Concerns to be Addressed: discharge planning     Patient plan of care discussed at interdisciplinary rounds: Yes  Anticipated Discharge Disposition: Transitional Care  Anticipated Discharge Services: None  Anticipated Discharge DME: None  Patient/family educated on Medicare website which has current facility and service quality ratings: yes  Education Provided on the Discharge Plan: Yes  Patient/Family in Agreement with the Plan: yes  Referrals Placed by CM/SW: Post Acute Facilities  Private pay costs discussed: Not applicable  Discussed  Partnership in Safe Discharge Planning  document with patient/family: Yes   Handoff Completed: No, handoff not indicated or clinically appropriate  Additional Information: Plan for [facility] care conference tomorrow at 2pm in pt room. Writer called and spoke with pt's daughter Felton, who confirmed that she and her sister Mel will be present in-person. She noted that she believes her sister Mel will want to pursue TCU and believes Gali may think that the pt has a good rehab trajectory. She asked the care team to be explicit at the GO conference about pt's rehab potential and disposition options.    ROBIN Villarreal, [facility]  Clinical , [facility]-  Desk Phone: [phone]   Schedule: Wednesday, Thursday, Fridays (for Mondays & Tuesdays, contact job share partner Josselyn Leblancs)  Securely message with [employer] (Search Name or 7C Med Surg [phone] SW)  Text page via [employer] Paging/Directory   See signed in provider for up to date coverage information  Social Work & Care Management Department    Weekend And After Hours Care Management Contacts:  After Hours Social Work Coverage [phone] daily: Searchable in Vocera \"Adult SW After Hours On Call\"   Weekend Coverage [phone]: Searchable in Vocera \"7C Med Surg SW\" or \"7C Med Surg RNCC\"    "

## 2025-05-23 ENCOUNTER — APPOINTMENT (OUTPATIENT)
Dept: OCCUPATIONAL THERAPY | Facility: CLINIC | Age: 65
DRG: 064 | End: 2025-05-23
Payer: COMMERCIAL

## 2025-05-23 LAB
ALBUMIN SERPL BCG-MCNC: 3 G/DL (ref 3.5–5.2)
ALP SERPL-CCNC: 232 U/L (ref 40–150)
ALT SERPL W P-5'-P-CCNC: 29 U/L (ref 0–50)
ANION GAP SERPL CALCULATED.3IONS-SCNC: 8 MMOL/L (ref 7–15)
AST SERPL W P-5'-P-CCNC: 41 U/L (ref 0–45)
BASOPHILS # BLD AUTO: 0 10E3/UL (ref 0–0.2)
BASOPHILS NFR BLD AUTO: 1 %
BILIRUB SERPL-MCNC: 0.2 MG/DL
BUN SERPL-MCNC: 31 MG/DL (ref 8–23)
CALCIUM SERPL-MCNC: 8.6 MG/DL (ref 8.8–10.4)
CHLORIDE SERPL-SCNC: 106 MMOL/L (ref 98–107)
CPR GENES ISLT/SPM QL: NEGATIVE
CREAT SERPL-MCNC: 1.43 MG/DL (ref 0.51–0.95)
EGFRCR SERPLBLD CKD-EPI 2021: 40 ML/MIN/1.73M2
EOSINOPHIL # BLD AUTO: 0.2 10E3/UL (ref 0–0.7)
EOSINOPHIL NFR BLD AUTO: 3 %
ERYTHROCYTE [DISTWIDTH] IN BLOOD BY AUTOMATED COUNT: 16 % (ref 10–15)
GLUCOSE BLDC GLUCOMTR-MCNC: 101 MG/DL (ref 70–99)
GLUCOSE BLDC GLUCOMTR-MCNC: 107 MG/DL (ref 70–99)
GLUCOSE BLDC GLUCOMTR-MCNC: 108 MG/DL (ref 70–99)
GLUCOSE BLDC GLUCOMTR-MCNC: 154 MG/DL (ref 70–99)
GLUCOSE BLDC GLUCOMTR-MCNC: 74 MG/DL (ref 70–99)
GLUCOSE SERPL-MCNC: 124 MG/DL (ref 70–99)
HCO3 SERPL-SCNC: 26 MMOL/L (ref 22–29)
HCT VFR BLD AUTO: 31.2 % (ref 35–47)
HGB BLD-MCNC: 9.6 G/DL (ref 11.7–15.7)
IMM GRANULOCYTES # BLD: 0 10E3/UL
IMM GRANULOCYTES NFR BLD: 1 %
LYMPHOCYTES # BLD AUTO: 1.8 10E3/UL (ref 0.8–5.3)
LYMPHOCYTES NFR BLD AUTO: 27 %
MCH RBC QN AUTO: 26.8 PG (ref 26.5–33)
MCHC RBC AUTO-ENTMCNC: 30.8 G/DL (ref 31.5–36.5)
MCV RBC AUTO: 87 FL (ref 78–100)
MONOCYTES # BLD AUTO: 0.5 10E3/UL (ref 0–1.3)
MONOCYTES NFR BLD AUTO: 8 %
NEUTROPHILS # BLD AUTO: 4 10E3/UL (ref 1.6–8.3)
NEUTROPHILS NFR BLD AUTO: 61 %
NRBC # BLD AUTO: 0 10E3/UL
NRBC BLD AUTO-RTO: 0 /100
PLATELET # BLD AUTO: 227 10E3/UL (ref 150–450)
POTASSIUM SERPL-SCNC: 4.4 MMOL/L (ref 3.4–5.3)
PROT SERPL-MCNC: 5.7 G/DL (ref 6.4–8.3)
RBC # BLD AUTO: 3.58 10E6/UL (ref 3.8–5.2)
SODIUM SERPL-SCNC: 140 MMOL/L (ref 135–145)
SPECIMEN TYPE: NORMAL
WBC # BLD AUTO: 6.6 10E3/UL (ref 4–11)

## 2025-05-23 PROCEDURE — 97530 THERAPEUTIC ACTIVITIES: CPT | Mod: GO

## 2025-05-23 PROCEDURE — 36415 COLL VENOUS BLD VENIPUNCTURE: CPT

## 2025-05-23 PROCEDURE — 120N000002 HC R&B MED SURG/OB UMMC

## 2025-05-23 PROCEDURE — 99232 SBSQ HOSP IP/OBS MODERATE 35: CPT | Mod: 24

## 2025-05-23 PROCEDURE — 250N000013 HC RX MED GY IP 250 OP 250 PS 637

## 2025-05-23 PROCEDURE — 99418 PROLNG IP/OBS E/M EA 15 MIN: CPT | Performed by: FAMILY MEDICINE

## 2025-05-23 PROCEDURE — 99207 PR APP CREDIT; MD BILLING SHARED VISIT: CPT | Performed by: PHYSICIAN ASSISTANT

## 2025-05-23 PROCEDURE — 80053 COMPREHEN METABOLIC PANEL: CPT

## 2025-05-23 PROCEDURE — 250N000011 HC RX IP 250 OP 636

## 2025-05-23 PROCEDURE — 85025 COMPLETE CBC W/AUTO DIFF WBC: CPT

## 2025-05-23 PROCEDURE — 99233 SBSQ HOSP IP/OBS HIGH 50: CPT | Performed by: FAMILY MEDICINE

## 2025-05-23 PROCEDURE — 250N000009 HC RX 250: Performed by: STUDENT IN AN ORGANIZED HEALTH CARE EDUCATION/TRAINING PROGRAM

## 2025-05-23 PROCEDURE — 250N000013 HC RX MED GY IP 250 OP 250 PS 637: Performed by: STUDENT IN AN ORGANIZED HEALTH CARE EDUCATION/TRAINING PROGRAM

## 2025-05-23 PROCEDURE — 99232 SBSQ HOSP IP/OBS MODERATE 35: CPT | Mod: FS | Performed by: INTERNAL MEDICINE

## 2025-05-23 PROCEDURE — 97110 THERAPEUTIC EXERCISES: CPT | Mod: GO

## 2025-05-23 RX ADMIN — Medication 1000 MCG: at 08:45

## 2025-05-23 RX ADMIN — Medication 15 ML: at 08:44

## 2025-05-23 RX ADMIN — MICONAZOLE NITRATE: 20 CREAM TOPICAL at 17:59

## 2025-05-23 RX ADMIN — INSULIN ASPART 1 UNITS: 100 INJECTION, SOLUTION INTRAVENOUS; SUBCUTANEOUS at 20:55

## 2025-05-23 RX ADMIN — CLOPIDOGREL BISULFATE 75 MG: 75 TABLET, FILM COATED ORAL at 08:45

## 2025-05-23 RX ADMIN — LEVETIRACETAM 250 MG: 100 SOLUTION ORAL at 08:44

## 2025-05-23 RX ADMIN — ATORVASTATIN CALCIUM 40 MG: 40 TABLET, FILM COATED ORAL at 08:45

## 2025-05-23 RX ADMIN — HEPARIN SODIUM 5000 UNITS: 5000 INJECTION, SOLUTION INTRAVENOUS; SUBCUTANEOUS at 21:48

## 2025-05-23 RX ADMIN — LEVOTHYROXINE SODIUM 88 MCG: 88 TABLET ORAL at 08:45

## 2025-05-23 RX ADMIN — DICLOFENAC SODIUM 2 G: 10 GEL TOPICAL at 20:56

## 2025-05-23 RX ADMIN — LEVETIRACETAM 250 MG: 100 SOLUTION ORAL at 20:57

## 2025-05-23 RX ADMIN — HEPARIN SODIUM 5000 UNITS: 5000 INJECTION, SOLUTION INTRAVENOUS; SUBCUTANEOUS at 14:20

## 2025-05-23 RX ADMIN — MICONAZOLE NITRATE: 20 CREAM TOPICAL at 20:57

## 2025-05-23 RX ADMIN — Medication 1 MG: at 21:48

## 2025-05-23 RX ADMIN — CARVEDILOL 6.25 MG: 3.12 TABLET, FILM COATED ORAL at 08:45

## 2025-05-23 RX ADMIN — Medication 20 MG: at 08:45

## 2025-05-23 RX ADMIN — GABAPENTIN 100 MG: 100 CAPSULE ORAL at 21:48

## 2025-05-23 RX ADMIN — CARVEDILOL 6.25 MG: 3.12 TABLET, FILM COATED ORAL at 17:04

## 2025-05-23 RX ADMIN — HEPARIN SODIUM 5000 UNITS: 5000 INJECTION, SOLUTION INTRAVENOUS; SUBCUTANEOUS at 05:45

## 2025-05-23 RX ADMIN — ASPIRIN 81 MG CHEWABLE TABLET 324 MG: 81 TABLET CHEWABLE at 08:44

## 2025-05-23 ASSESSMENT — ACTIVITIES OF DAILY LIVING (ADL)
ADLS_ACUITY_SCORE: 94
ADLS_ACUITY_SCORE: 98
ADLS_ACUITY_SCORE: 94
ADLS_ACUITY_SCORE: 98
ADLS_ACUITY_SCORE: 98
ADLS_ACUITY_SCORE: 94
ADLS_ACUITY_SCORE: 94
ADLS_ACUITY_SCORE: 98
ADLS_ACUITY_SCORE: 98
ADLS_ACUITY_SCORE: 94
ADLS_ACUITY_SCORE: 98
ADLS_ACUITY_SCORE: 94
ADLS_ACUITY_SCORE: 94
ADLS_ACUITY_SCORE: 98
ADLS_ACUITY_SCORE: 98
ADLS_ACUITY_SCORE: 94
ADLS_ACUITY_SCORE: 94
ADLS_ACUITY_SCORE: 98

## 2025-05-23 NOTE — PROGRESS NOTES
"[facility]  Palliative Care Daily Progress Note       Recommendations & Counseling   What's New Today:  Participated in care conference today with daughters Gali and Felton along with primary team and unit .  After care conference today, her 2 daughters are still considering considering pathway forward; see palliative care notes also from 5/16 care conference.    GOALS OF CARE:   Plan of  care -  restorative/life-sustaining with limits (DNR/DNI) - no permanent feeding tube  Care conference held today 5/23 with medicine, , and palliative. Family daughters Vishal and Felton were present.  We discussed recent clinical course, especially swallowing issues as outlined in SP and medicine notes.  Discussed how Lisa will accept a bite of food into her mouth, but then do nothing with it for some number of minutes.  Discussed that this does not seem to be behavioral in nature, but is most likely cumulative effects of underlying dementia and ongoing strokes.  Family declines PEG tube as part of plan of care.    Daughter Felton favors hospice approach to care, daughter Gali feels it is \"hard to get past giving her one more college try in TCU\".  Discussed the calculus of burden versus uncertainty of benefit in doing so.  We talked at length about what a comfort focused plan of care could look like with hospice support. Spent a lot of time discussing issues related to hand feeding, comfort in setting of poor p.o. intake, Family not sure they could provide her level of personal care needs at home in [place]; daughters are debating whether she could receive hospice care here in the [place] rather than returning home.   addressed a number of concerns about trying to get her home and set up with [place] insurance while she currently has [facility].  There are certainly concerns about stability for transport and for costs " involved to try to get her home if that should be the plan.    ADVANCE CARE PLANNING:  No health care directive on file. Per system policy, Surrogate Decision-makers for Patients With Diminished Decision-making Capacity offers guidance on possible decision-makers. Kirsty Ramey have been identified as a surrogate decision makers.   There is no POLST form on file, defer to patient and/or next of kin for decisions   Code status: No CPR- Do NOT Intubate     MEDICAL MANAGEMENT:   We are not actively managing symptoms at this time.      PSYCHOSOCIAL/SPIRITUAL SUPPORT:  Family - daughters Vishal and Felton (former is local, latter is in [place])  Some concern for caregiver burnout on Vishal's part, uncertain why she directed providers to speak with Felton instead  [facility] - Nanda Burris MD  Palliative Medicine Consult Team  Text me via [employer]  TT: 81 minutes, with > 50% spent in C/C/E patient/family/care teams re: GOC, POC, Sx management.         Assessments          Isabell Matthews is a 64 year old female with a past medical history of HTN, HLD, CKD IIIb, type 1 DM c/b by neuropathy, hx of ischemic and hemorrhagic stroke (ischemic R basal ganglia in 2018, hemorrhagic L basal ganglia in 2021, R centrum semiovale and R chuck in 2023), with residual facial droop (had right sided and left sided facial droop in past), vascular dementia, seizure disorder, hx of falls, and recurrent UTI.      She initially presented on 5/7/2025 with acute onset speech changes (soft voice), confusion, worsening of baseline cognition, and worse po intake. MRI brain showed multiple small acute infarcts in R frontal lobe near encephalomalacia from prior stroke and could likely have caused worsening of of her baseline symptoms. Stroke thought to be from ICAD.      Neuro signed off 5/10 after stroke workup but was then asked to be reinvolved 5/15 due to persistent somnolence and concern Keparveen was contributing to this.  Did not recommend new imaging, thought Keppra is unlikely to be contributing to AMS, and exam is similar to prior one. Made recs for ASA, Keppra, and Plavix (which was held before due to possible need for PEG).     Palliative was consulted on 5/15 for goals of care.     Today, the patient was seen for:  Hx of multiple strokes  Goals of care  Support  Palliative encounter            Interval History:     See discussion above regarding care conference with daughters today           Review of Systems:     Besides above, >4 ROS was reviewed and is unremarkable          Medications:     I have reviewed this patient's medication profile and medications during this hospitalization.           Physical Exam:   Vitals were reviewed  Temp: 98.3  F (36.8  C) Temp src: Oral BP: 133/59 Pulse: 99   Resp: 15 SpO2: 99 % O2 Device: None (Room air)    General: Not in acute distress.  Although she responds at times with some one-word answers, as best I could tell she did not demonstrate capacity to process complex medical information, and relies on her daughters for this.  NG FT in place             Data Reviewed:       CMP  Recent Labs   Lab 05/23/25  1206 05/23/25  0553 05/23/25  0409 05/23/25  0017 05/21/25  0922 05/21/25  0551 05/19/25  0952 05/19/25  0620 05/17/25  0803 05/17/25  0601   NA  --  140  --   --   --  139  --  141  --  140   POTASSIUM  --  4.4  --   --   --  4.6  --  4.8  --  4.7   CHLORIDE  --  106  --   --   --  104  --  107  --  103   CO2  --  26  --   --   --  25  --  24  --  25   ANIONGAP  --  8  --   --   --  10  --  10  --  12   * 124* 108* 107*   < > 210*   < > 120*   < > 322*   BUN  --  31.0*  --   --   --  30.4*  --  29.4*  --  26.1*   CR  --  1.43*  --   --   --  1.45*  --  1.50*  --  1.41*   GFRESTIMATED  --  40*  --   --   --  40*  --  38*  --  41*   VERONICA  --  8.6*  --   --   --  8.5*  --  8.6*  --  8.4*   PROTTOTAL  --  5.7*  --   --   --  5.7*  --  5.7*  --  5.4*   ALBUMIN  --  3.0*  --   --   --   3.1*  --  3.0*  --  2.9*   BILITOTAL  --  0.2  --   --   --  0.2  --  0.2  --  0.2   ALKPHOS  --  232*  --   --   --  251*  --  277*  --  276*   AST  --  41  --   --   --  39  --  38  --  25   ALT  --  29  --   --   --  26  --  22  --  15    < > = values in this interval not displayed.     CBC  Recent Labs   Lab 05/23/25  0553 05/21/25  0551 05/19/25  0620 05/17/25  0601   WBC 6.6 5.3 6.5 5.7   RBC 3.58* 3.52* 3.65* 3.60*   HGB 9.6* 9.6* 9.9* 9.5*   HCT 31.2* 30.7* 31.7* 31.1*   MCV 87 87 87 86   MCH 26.8 27.3 27.1 26.4*   MCHC 30.8* 31.3* 31.2* 30.5*   RDW 16.0* 15.8* 15.8* 15.3*    214 220 194     INRNo lab results found in last 7 days.  Arterial Blood Gas  No lab results found in last 7 days.

## 2025-05-23 NOTE — ACP (ADVANCE CARE PLANNING)
Pt up in chair for 3 hours, incontinent of bladder, intermittently using pure wick, pt able to help repo q 2 hours, denies pain, able to shake head yes and no at times or point to the words, brace to left arm to be worn when using but can be left off when not using, new PU found under this, updated MD and wocn ordered, no BM today, unable to take in po intake, attempts to feed breakfast lunch and dinner and pt is just pocketing food then have to discuss with pt and encourage pt to spit out food or swallow, remains on TF at goal

## 2025-05-23 NOTE — PLAN OF CARE
Pt has new non blanchable redness to left inner wrist, noticed after brace removed, left brace off X 2 hours and still does not agry, 2 areas with very small scabbing and non blanchable redness, updated MD and asked for wocn consult, will make sure pt has brace off when not needed

## 2025-05-23 NOTE — PROGRESS NOTES
Care Management Follow Up     Length of Stay (days): 15  Expected Discharge Date: 05/23/2025  Concerns to be Addressed: discharge planning     Patient plan of care discussed at interdisciplinary rounds: Yes  Anticipated Discharge Disposition: Transitional Care  Anticipated Discharge Services: None  Anticipated Discharge DME: None  Patient/family educated on Medicare website which has current facility and service quality ratings: yes  Education Provided on the Discharge Plan: Yes  Patient/Family in Agreement with the Plan: yes  Referrals Placed by CM/SW: Post Acute Facilities  Private pay costs discussed: Not applicable  Discussed  Partnership in Safe Discharge Planning  document with patient/family: Yes   Handoff Completed: No, handoff not indicated or clinically appropriate  Additional Information: [facility] Care conference today at 2pm with the pt, pt's daughter Macrelo, Palliative, and Gold 6 Provider. Pt's medical picture & prognosis discussed. Pathways of restorative versus comfort cares discussed. Family wanting to pursue hospice either at home (if pt's EW could cover the cost of some respite care) versus LTC with hospice. Marcelo will discuss and determine which direction they would like to go. Plan for repeat care conference on Tuesday at 2pm with family.    Writer called & LVM for pt's EW  Karla Kwadwo (Ph: [phone]) to ask questions about what services the EW could cover if pt went home on hospice. Writer requested a return call and invited her to a care conference next Tuesday at 2pm.    Pending Referrals:     [facility] Global Referral (Liaison, Consuelo Power; P: [phone]; F: [phone]; E: [email or web address])  5/21: Global referral sent. [facility] have no beds, but they can offer a bed at [facility], even with NG tube.     Inactive Referrals:     [facility] - Cannot meet pt's needs.  [facility] - Bed  "not available.  [facility] - Bed not available; acuity too high.  [facility]erral (6 facilities) - Global denial as acuity is too high.  [facility] - [place] tube; acuity too high  [facility] - Bed Not Available   [facility] - Cannot meet pt needs.  [facility] - no safe room r/t chronic confusion and fall hx   [facility] - Bed Not Available   [facility] - Bed not available.  [facility] - Declined; no reason given.  [facility] - declined as pt is not appropriate for their facility.    Discharge Resources:   Care Coordinator, Karla Burkett (Ph: [phone])    ROBIN Villarreal, [facility]  Clinical , [facility]-7C  Desk Phone: [phone]   Schedule: Wednesday, Thursday, Fridays (for Mondays & Tuesdays, contact job share partner Josselyn Brand)  Securely message with [employer] (Search Name or 7C Med Surg [phone] SW)  Text page via [facility] Paging/Directory   See signed in provider for up to date coverage information  Social Work & Care Management Department    Weekend And After Hours Care Management Contacts:  After Hours Social Work Coverage [phone] daily: Searchable in Vocera \"Adult SW After Hours On Call\"   Weekend Coverage [phone]: Searchable in Vocera \"7C Med Surg SW\" or \"7C Med Surg RNCC\"    "

## 2025-05-23 NOTE — PROGRESS NOTES
Inpatient Diabetes Management Service: Daily Progress Note     HPI: Isabell Matthews is a 64 year old female with history of  T1DM with diabetic neuropathy, CKD3b, HTN, HLD, orthostatic hypotension, seizures, vascular dementia, hypothyroidism, recurrent UTI's, and frequent falls who was admitted to [facility] 5/7/2025 with concern for stroke after presenting with acute neurologic symptoms. Hospital course has been complicated by labile BG and dysphagia, she continues to be NPO and continues to be on continuous TF.     IDS consulted to assist with glycemic management as well as optimization while inpatient and when applicable, recommendations for transition home.         Assessment/Plan:     Assessment:  Type 1 Diabetes Mellitus c/b peripheral neuropathy, nephropathy, Sub-optimal control. (A1c 8.8 % 3/23/25) in presence of complex illness and anemia.   Vascular dementia with acute cerebral infarcts new findings of dysarthria, dysphagia, decreased responsiveness, confusion   Labile BG 2/2 Tube feedings  BMI: 22    Plan/Recommendations:    ** patient has T1DM - requires insulin, basal dose must not to be withheld entirely OR for prolonged periods, high risk for DKA. If trending >250 mg/dL longer than 4 hours, please draw appropriate labs to determine if DKA and treat accordingly **     - NPH 15 unit(s) at 0900 with tube feeding (ModuleQ 1.4 at 45 ml/hr). Hold if TF held.  - Decrease NPH 6 --> 5 unit(s) at 2100 with tube feeding (ModuleQ 1.4 at 45 ml/hr). Hold if TF held.   - PRN D10 at 50 ml/hr if tube feeds are stopped/interrupted (dosed Desi [employer] Standard 1.4 @ goal 45 mL/hr) dosing provides 75% of CHO that would have been given by TF   - Lantus 8 units q 24 hrs at 1600 [DKA protective dose]  - Start Novolog Carb Coverage: 1 unit per 20 grams CHO TID with meals and PRN with snacks/supplements  - Novolog Correction Scale: 1:50>140 q4 hours (medium resistance)  - BG checks: q4hr  - Hypoglycemia  protocol    - Carb counting protocol     Per ADA guidelines, treatment goals and recommendations for older adults with DM and medically complexity is less stringent BG control/A1c for safety - targets of 110-180 mg/dL and up to 250 mg/dL reasonable - albeit not persistent for those with T1DM. Avoid reliance on A1c. Glucose decisions should be based on avoidance of hypoglycemia and symptomatic hyperglycemia.       Discussion:   Tight glycemic control overnight, but no hypoglycemia recorded. Will reduce NPH slightly overnight. Diet advanced yesterday to pureed with 1:1 supervision. Will add carb coverage. Care conference planned for today. May decide to d/c tube feeds. Will await results of care conference.     Discharge Planning: (tentative) --> likely TCU at discharge   Medications: TBD  - adjustment of PTA dosing with addition of NPH to cover tube feeds as indicated   Needs set doses for day program, would be able to use ICR at home.   Test Claims:  none needed.   Education:  Needs to be assessed closer to discharge.    Outpatient Follow-up: PCP- Dr Kellogg ( LOV 4/24/25), until able to establish with [facility]- has appointment with Mai Figueroa 8/4/25    Please notify Inpatient Diabetes Service if changes are planned to steroids, nutrition, TPN/TF and anticipated procedures requiring prolonged NPO status.         Interval History/Review of Systems :   - The last 24 hours progress and nursing notes reviewed.  - No acute events overnight.  - Pt's diet advanced to pureed with 1:1 supervision  - Care conference planned for today.     Planned Procedures/Surgeries: none    Inpatient Glucose Control:       Recent Labs   Lab 05/23/25  0553 05/23/25  0409 05/23/25  0017 05/22/25  1956 05/22/25  1944 05/22/25  1611   * 108* 107* 140* 132* 197*           Medications Impacting Glycemia:   Steroids: none  D5W containing solutions/medications: none   Other medications impacting glucose: none         Nutrition:   Orders Placed This Encounter      Pureed Diet (level 4) Thin Liquids (level 0)  Supplements:  none  TPN: none   TF:   5/13 - 5/17: been at Osmolite 1.5 goal of 40 ml/hr since 5/13, provides 195 g cho in 24hrs  5/17 - current: changed to continuous Spectrum5 Standard 1.4 @ goal 45 mL/hr provides 170 g cho in 24 hrs         Diabetes History: see full consult note for complete diabetes history   Diabetes Type and Duration: DM for > 40 years, diagnosed in her 40s. Per daughter,  initially told she has T2DM then T1DM for a while then flipped back to being told T2DM.      6/17/2024 positive GAD65 antibody and c-peptide <0.1 but BG elevated     PTA Medication Regimen:   From discharge 3/30/25:- daughter not present to confirm   - Lantus 18 units once daily at HS  - Lispro ICR 1:15 TID AC, 1:20 PRN with snacks/supplements  - Lispro correction 1:50>150 TID AC, >200 HS     At discharge had discussed challenges around glucose lability and dosing: Lantus dose of 18 units may be too high if patient is not eating. However, daughter notes that with more dramatic changes patient will sometimes rise to 400s and has been in DKA before which is hard to balance. If she is concerned about mom going low at night, will try to give her a snack to support Lantus dosing. She is working to resume CGM to more closely monitor blood sugars.  Missing doses?: unknown, daughter not present to confirm   Historical Diabetes Medications: various insulins and dosing      Glucose monitoring device/frequency/trends: Historically has used glucometer before meals and at bedtime. Has tried and failed multiple CGMs (issues with accuracy or patient picking CGM off).   Current trends unknown as daughter not present to discuss- historically known to be labile with needs dropping dramatically when NPO    Hypoglycemia PTA:   - Frequency: currently unknown, but historically labile.   - Severity: + history of severe unconscious lows   - Awareness:  variable, not intact    - Treatment: candy, juice       Outpatient Diabetes Provider: Current PCP: Dr Kellogg ( LOV 4/24/25) ; seen by Dr Bony Castaneda in the past (LOV 10/7/24)   Endo referral placed by PCP and IDS last admission, Scheduled with Mai Figueroa, PAC 8/4/25   Formal Diabetes Education/Educator: none recently          Physical Exam:   /61   Pulse 98   Temp 97.9  F (36.6  C) (Oral)   Resp 14   Wt 60.9 kg (134 lb 4.8 oz)   SpO2 99%   BMI 23.80 kg/m    Constitutional: tired-appearing patient in no acute distress.  Eyes: sclera anicteric.  Respiratory: No signs of labored breathing.  Psych: minimally responsive. Able to nod         Data:     Lab Results   Component Value Date    A1C 8.8 (H) 03/23/2025    A1C 9.6 (H) 01/16/2025    A1C 11.2 (H) 09/23/2024    A1C 10.2 (H) 05/20/2024    A1C 10.7 (H) 01/29/2024    A1C 7.8 (H) 06/21/2021    A1C 11.7 (H) 09/27/2020     ROUTINE IP LABS (Last four results)  BMP  Recent Labs   Lab 05/23/25  0553 05/23/25  0409 05/23/25  0017 05/22/25  1956 05/21/25  0922 05/21/25  0551 05/19/25  0952 05/19/25  0620 05/17/25  0803 05/17/25  0601     --   --   --   --  139  --  141  --  140   POTASSIUM 4.4  --   --   --   --  4.6  --  4.8  --  4.7   CHLORIDE 106  --   --   --   --  104  --  107  --  103   VERONICA 8.6*  --   --   --   --  8.5*  --  8.6*  --  8.4*   CO2 26  --   --   --   --  25  --  24  --  25   BUN 31.0*  --   --   --   --  30.4*  --  29.4*  --  26.1*   CR 1.43*  --   --   --   --  1.45*  --  1.50*  --  1.41*   * 108* 107* 140*   < > 210*   < > 120*   < > 322*    < > = values in this interval not displayed.     CBC  Recent Labs   Lab 05/23/25  0553 05/21/25  0551 05/19/25  0620 05/17/25  0601   WBC 6.6 5.3 6.5 5.7   RBC 3.58* 3.52* 3.65* 3.60*   HGB 9.6* 9.6* 9.9* 9.5*   HCT 31.2* 30.7* 31.7* 31.1*   MCV 87 87 87 86   MCH 26.8 27.3 27.1 26.4*   MCHC 30.8* 31.3* 31.2* 30.5*   RDW 16.0* 15.8* 15.8* 15.3*    214 220 194     Inpatient Diabetes  Service will continue to follow, please don't hesitate to contact the team with any questions or concerns.     Mirela Anand PA-C  Inpatient Diabetes Service  Available on [employer] or Secure Chat     Please notify inpatient diabetes service if changes are planned to steroids, nutrition, or if procedures are planned requiring prolonged NPO status.Diabetes Management Team job code: [phone]    To contact Inpatient Diabetes Service:     7 AM - 5 PM: Page the IDS BULMARO following the patient that day (see filed or incomplete progress notes/consult notes under Endocrinology)    OR if uncertain of provider assignment: page job code [phone]    5 PM - 7 AM: First call after hours is to primary service.    For urgent after-hours questions, page job code for on call fellow: [phone]     I spent a total of 35 minutes on the date of the encounter doing prep/post-work, chart review, history and exam, documentation and further activities per the note including lab review, multidisciplinary communication, counseling the patient and/or coordinating care regarding acute hyper/hypoglycemic management, as well as discharge management and planning/communication.  See note for details.

## 2025-05-23 NOTE — PLAN OF CARE
Goal Outcome Evaluation:    Shift Hours: 1900 - 0700    Assessment:  Body systems that were not at patient's baseline Gastrointestinal and Safety. Focused body system assessments documented in flowsheets.        Activity     Fall Risk Score: 110   Bed alarm on? Yes     Activity Assistance Provided: assistance, 2 people      Assistive Device Utilized: walker, gait belt    Pain: Pt denied this shift    Labs/RN Managed Protocols: BG Q4H    Lines/Drains: NJ, 2 PIV (R) SL    Nutrition: TF. Pureed feeder    Goal Outcome Evaluation    Plan of Care Reviewed With: patient  Overall Patient Progress: no change  Outcome Evaluation: Patient rested well overnight. Rounded on Q1H. Bed alarm on. Turns well and follows commands. Cream applied to bottom with incontence. incontinent of bowel and bladder. Diet advanced to pureed and 1:1 feed, with much supervision and instruction. No PO intake this shift. TF running @ goal through britled NJ. Meds through NJ. No major complaints or S/S of pain this shift. BG monitored Q4, stable all shift. CPOC.    Barriers to Discharge:     Family care conference

## 2025-05-23 NOTE — PROGRESS NOTES
[facility]    Medicine Progress Note - Hospitalist Service, GOLD TEAM 6    Date of Admission:  5/7/2025    Assessment & Plan   Isabell Matthews is a 64 year old female with a past medical history of HTN, T1DM, HLD, diabetic neuropathy, orthostatic hypotension, seizures, vascular dementia, prior CVAs, hypothyroidism, recurrent UTIs, and frequent falls. She initially presented to the ED for evaluation of stroke-like symptoms and was admitted for further monitoring and management.     Updates for 5/23/2025:  - No changes in care plan today  - Will continue tube feeds pending family decision re: ongoing nutrition plan    Care conference today with family (daughters Vishal and Felton), Dr. Burris of Palliative Care, and Social work.  Discussed SLP findings and recommendations - inability to swallow more likely related to dementia than dysphagia, unlikely to make progress with more aggressive SLP therapy. Options of TCU placement for attempt at rehab vs hospice discussed. Dr. Burris explained what hospice care would look like for patient. Daughters agreed that chance of recovery very low. They agree that patient would prefer quality of life and would not want to end up in long term care. They would like to look into hospice options. Social work will start process of looking for hospice facilities. Given the holiday weekend, will unlikely discharge before Tuesday (5/27).        Multifocal Acute Cerebral Infarcts  Hx of Multiple CVAs  Hx of Seizures  Pt initially presented w/ new findings of dysarthria, dysphagia, decreased responsiveness, confusion. However, while in the ED, no new focal neurologic sx were observed. Head CT negative for acute infarct or hemorrhage. CTA w/ multifocal stenosis involving L RADHA and R MCA which had progressed since 1/16/25. Stroke Neuro team consulted while in the ED, and loaded with Keppra on arrival. EEG negative. Initially, she was found to have a UTI, so  there was concern that perhaps her presentation was d/t recrudescence of prior CVA deficits, however, MRI brain did show acute infarcts in R corona radiata, precentral gyrus, and operculum. Unfortunately, she was not a candidate for advanced therapies. Clinically stable. No new focal neurologic deficits. Still non-verbal.   - Neuro Stroke signed off  - Keppra 250mg BID   - DAPT with ASA + Plavix x 90 days from 5/15 (then aspirin lifeliong)  - Palliative Care consulted, greatly appreciate assistance (see GOC below)    - Atorvastatin 40mg daily  - At discharge, will need ZioPatch x14 days mailed out (if still within goal of care)  - Stroke Neuro follow-up in 8 weeks     Acute Metabolic Encephalopathy, likely multifactorial  Hx of Vascular Dementia  Acute change in status noted on arrival, possibly 2/2 acute CVA vs metabolic encephalopathy. Likely multifactorial with UTI, dehydration, hyperglycemia, hypercapnia, and hospital environment contributing, as well as newfound acute CVA as above. No significant electrolyte abnormalities. No suspicious meds. TSH 0.14. Overall stable. Patient no longer appears somnolent or encephalopathic, although her cognitive function does not appear intact.  Family feels patient is close to baseline. Discussed concerns that swallowing issues more related to cognition. Family seems to agree.  - Monitor clinically  - Bedside sitter discontinued since 5/15  - Delirium precautions              - Would recommend bed inclined, lights on, blinds open and TV on during the day and then at night having all these off and bed more supine to encourage proper sleep-wake cycle  - Continue melatonin 1mg at bedtime      UTI, recurrent, resolved  Urine culture from 5/1/25 grew pan-susceptible E.Coli. Afebrile on arrival, WBC normal. No other obvious source of infection. Repeat UA/UC negative.    - Completed 7 days of IV Ceftriaxone on 5/12     Dysphagia (S/P NGT placement 5/10)  Patient failed initial  swallow study. Likely in the setting of acute encephalopathy and acute stroke. SLP consulted, recommended strict NPO. NG placed 5/10, and has now begun TFs without issues. Currently no desire to pursue PEG per goals of care discussion. NG tube feeds more of a temporary measure to see if swallowing will improve. SLP following. No plan for repeat VFSS as patient unable to participate from cognitive standpoint. No actual swallow concerns. SLP re-evaluated 5/22 at bedside -- patient retained food/liquids in mouth for ~10 minutes despite multiple cues to swallow. When patient eventually swallows there is no dysphagia or aspiration. She is at increased risk for aspiration due to oral retention, otherwise swallow function appears to be intact. She would not be a candidate for further SLP interventions or exercises.  - SLP findings and recommendations discussed with family  - Continue pureed diet with thin liquids and 1:1 supervision  - Could consider calorie counts depending on amount of meaningful intake  - Continue TF at goal rate of 45mL/hr via NGT, will discuss plan for ongoing TF with family tomorrow     Hypernatremia, resolved  Free water deficit in setting of dysphagia and NPO status. Resolved with hypotonic fluids.   - Trend lytes daily      Possible Stress Cardiomyopathy  ECHO in the ED showed LVEF 50-55% with mid ventricular hypokinesis w/ preserved apical and mid segements c/w stress CM. No obvious signs of heart failure. Clinically, she has no s/sx to suggest acutely decompensated HF.  - Continue to monitor  - Continue Carvedilol w/ hold parameters     Type 1 Diabetes Mellitus  Hypoglycemia  A1c of 8.8% in 3/2025. BG in range of 300-400 this admission. NPO due to dysphagia. Hx of labile sugars and hypoglycemia in setting of infection. Home regimen: Lantus 18 units at bedtime. Endocrine consulted to assist with insulin dosing in setting of tube feeds. Overall BG well controlled. Does have intermittent  hypoglycemia, but this has overall improved with adjustments to insulin regimen.  - Endocrine following, appreciate assistance              - Lantus to 8 units at 2000                 - NPH 15 units qAM + 5 units qPM              - Medium sliding scale insulin Q4H for now while NPO              - BG checks Q4H while NPO              - Hypoglycemia protocol              - PRN D10 at 60mL/hr if TFs are stopped/interrupted     Hypothyroidism  Most recent TSH 0.99. Repeat TSH here 0.14. On admission, daughter noted recent medication adjustments (brand synthroid vs generic) therefore may have been over-replaced. Cannot r/o sick euthyroid state as well.  - Continue PTA PO synthroid 88mcg   - Recheck TSH in 1-2 weeks when clinically stable     CKD stage IIIb  Baseline Cr of 1.4-1.6. Cr improved to 1.3-1.4 with IVF. Suspect chronic dehydration contributing.  - BMP daily for now  - Avoid nephrotoxic agents as best as possible     HTN  HLD  -180 on admission. Can allow for permissive hypertension in setting of acute stroke per Stroke Neuro.  - Continue PTA Carvedilol w/ hold parameters  - Continue PTA Atorvastatin w/ enteral access via NG  - Transitioned MD ASA to PO ASA 324mg daily as above  - IV hydralazine PRN for SBP>180     Chronic Low Back Pain  - Diclofenac gel PRN  - Continue PTA Duloxetine, Gabapentin as now have enteral access     Seasonal allergies  - Hold PTA loratidine for now to reduce pill burden via NGT     Closed Colles' Fracture of L Wrist  Occurred in April, saw Ortho 4/22/25, but not deemed a surgical candidate d/t her medical comorbidities.  Per Ortho, they wanted her in brace and to be NWB status at L wrist. Case discussed with Ortho on 5/19. Repeat XR redemonstrated known comminuted intra-articular fracture of distal left radius.   - Ortho consulted  - Coffee cup weight bearing to L wrist.  - Thumb spica brace to L wrist pending ortho consult          Diet: Adult Formula Drip Feeding: Continuous  [employer] Standard 1.4; Nasogastric tube; Goal Rate: 45 ( can start at goal ); mL/hr; Initiate at 10ml/hr and advance by 10ml Q8H as tolerated.; Do not advance tube feeding rate unless K+ is = or > 3.0, Mg++...  Pureed Diet (level 4) Thin Liquids (level 0)  Snacks/Supplements Adult: Other; Please send Glucerna with every bkf, lunch and dinner trays; With Meals  Snacks/Supplements Adult: Magic Cup; Between Meals  Calorie Counts    DVT Prophylaxis: Pneumatic Compression Devices  Parker Catheter: Not present  Lines: None     Cardiac Monitoring: None  Code Status: No CPR- Do NOT Intubate      Clinically Significant Risk Factors               # Hypoalbuminemia: Lowest albumin = 2.9 g/dL at 5/17/2025  6:01 AM, will monitor as appropriate     # Hypertension: Noted on problem list     # Dementia: noted on problem list         # Severe Malnutrition: based on nutrition assessment and treatment provided per dietitian's recommendations.    # Financial/Environmental Concerns:           Social Drivers of Health    Food Insecurity: Unknown (5/17/2025)    Food Insecurity     Within the past 12 months, did you worry that your food would run out before you got money to buy more?: Patient unable to answer     Within the past 12 months, did the food you bought just not last and you didn t have money to get more?: Patient unable to answer   Depression: At risk (4/2/2025)    PHQ-2     PHQ-2 Score: 4   Housing Stability: Unknown (5/17/2025)    Housing Stability     Do you have housing? : Patient unable to answer     Are you worried about losing your housing?: Patient unable to answer   Tobacco Use: Medium Risk (4/24/2025)    Patient History     Smoking Tobacco Use: Former     Smokeless Tobacco Use: Never   Financial Resource Strain: Unknown (5/17/2025)    Financial Resource Strain     Within the past 12 months, have you or your family members you live with been unable to get utilities (heat, electricity) when it was really needed?:  "Patient unable to answer   Transportation Needs: Unknown (5/17/2025)    Transportation Needs     Within the past 12 months, has lack of transportation kept you from medical appointments, getting your medicines, non-medical meetings or appointments, work, or from getting things that you need?: Patient unable to answer   Interpersonal Safety: Unknown (5/17/2025)    Interpersonal Safety     Do you feel physically and emotionally safe where you currently live?: Patient unable to answer     Within the past 12 months, have you been hit, slapped, kicked or otherwise physically hurt by someone?: Patient unable to answer     Within the past 12 months, have you been humiliated or emotionally abused in other ways by your partner or ex-partner?: Patient unable to answer          Disposition Plan     Medically Ready for Discharge: Anticipated in 2-4 Days           The patient's care was discussed with the Attending Physician, Dr. Palomares.    Javier Johnson PA-C  Hospitalist Service, [address]  Securely message with [employer] (more info)  Text page via [facility] Paging/Directory   See signed in provider for up to date coverage information  ______________________________________________________________________    Interval History   No acute events overnight. Awake and alert. Nonverbal. Shakes head \"no\" when asked about pain. Denies other complaints.     Physical Exam   Vital Signs: Temp: 97.9  F (36.6  C) Temp src: Oral BP: 135/61 Pulse: 98   Resp: 14 SpO2: 99 % O2 Device: None (Room air)    Weight: 134 lbs 4.8 oz    General Appearance:  Awake. Alert. NAD.   HEENT:  Unremarkable.   Respiratory:  Normal effort. CTAB. No wheezing, rhonchi, rales.   Cardiovascular:  RRR. S1/S2. No murmurs.   GI:  Soft, non-distended, non-tender, +BS.   Extremity:  Trace pitting edema BLE  Skin:  No visible rash.   Neuro:  Grossly non-focal. ? Left facial droop.     Medical Decision Making "       80 MINUTES SPENT BY ME on the date of service doing chart review, history, exam, documentation & further activities per the note.      Data     I have personally reviewed the following data over the past 24 hrs:    6.6  \   9.6 (L)   / 227     140 106 31.0 (H) /  101 (H)   4.4 26 1.43 (H) \     ALT: 29 AST: 41 AP: 232 (H) TBILI: 0.2   ALB: 3.0 (L) TOT PROTEIN: 5.7 (L) LIPASE: N/A       Imaging results reviewed over the past 24 hrs:   No results found for this or any previous visit (from the past 24 hours).

## 2025-05-24 LAB
GLUCOSE BLDC GLUCOMTR-MCNC: 158 MG/DL (ref 70–99)
GLUCOSE BLDC GLUCOMTR-MCNC: 172 MG/DL (ref 70–99)
GLUCOSE BLDC GLUCOMTR-MCNC: 176 MG/DL (ref 70–99)
GLUCOSE BLDC GLUCOMTR-MCNC: 218 MG/DL (ref 70–99)
GLUCOSE BLDC GLUCOMTR-MCNC: 233 MG/DL (ref 70–99)
GLUCOSE BLDC GLUCOMTR-MCNC: 296 MG/DL (ref 70–99)
GLUCOSE BLDC GLUCOMTR-MCNC: 323 MG/DL (ref 70–99)
SPECIMEN TYPE: NORMAL
VZV DNA SPEC QL NAA+PROBE: NOT DETECTED

## 2025-05-24 PROCEDURE — 250N000013 HC RX MED GY IP 250 OP 250 PS 637: Performed by: STUDENT IN AN ORGANIZED HEALTH CARE EDUCATION/TRAINING PROGRAM

## 2025-05-24 PROCEDURE — 87798 DETECT AGENT NOS DNA AMP: CPT | Performed by: PHYSICIAN ASSISTANT

## 2025-05-24 PROCEDURE — 120N000002 HC R&B MED SURG/OB UMMC

## 2025-05-24 PROCEDURE — 250N000013 HC RX MED GY IP 250 OP 250 PS 637

## 2025-05-24 PROCEDURE — 99231 SBSQ HOSP IP/OBS SF/LOW 25: CPT | Mod: 24

## 2025-05-24 PROCEDURE — 99207 PR APP CREDIT; MD BILLING SHARED VISIT: CPT | Performed by: PHYSICIAN ASSISTANT

## 2025-05-24 PROCEDURE — 99232 SBSQ HOSP IP/OBS MODERATE 35: CPT | Mod: FS | Performed by: INTERNAL MEDICINE

## 2025-05-24 PROCEDURE — 250N000011 HC RX IP 250 OP 636

## 2025-05-24 PROCEDURE — 250N000009 HC RX 250: Performed by: STUDENT IN AN ORGANIZED HEALTH CARE EDUCATION/TRAINING PROGRAM

## 2025-05-24 RX ORDER — ACETAMINOPHEN 325 MG/1
325 TABLET ORAL EVERY 4 HOURS PRN
Status: DISCONTINUED | OUTPATIENT
Start: 2025-05-24 | End: 2025-06-12 | Stop reason: HOSPADM

## 2025-05-24 RX ADMIN — HEPARIN SODIUM 5000 UNITS: 5000 INJECTION, SOLUTION INTRAVENOUS; SUBCUTANEOUS at 21:52

## 2025-05-24 RX ADMIN — CLOPIDOGREL BISULFATE 75 MG: 75 TABLET, FILM COATED ORAL at 08:35

## 2025-05-24 RX ADMIN — INSULIN ASPART 1 UNITS: 100 INJECTION, SOLUTION INTRAVENOUS; SUBCUTANEOUS at 00:37

## 2025-05-24 RX ADMIN — ATORVASTATIN CALCIUM 40 MG: 40 TABLET, FILM COATED ORAL at 08:35

## 2025-05-24 RX ADMIN — LEVOTHYROXINE SODIUM 88 MCG: 88 TABLET ORAL at 08:35

## 2025-05-24 RX ADMIN — INSULIN ASPART 4 UNITS: 100 INJECTION, SOLUTION INTRAVENOUS; SUBCUTANEOUS at 08:34

## 2025-05-24 RX ADMIN — Medication 1 MG: at 21:52

## 2025-05-24 RX ADMIN — MICONAZOLE NITRATE: 20 CREAM TOPICAL at 20:32

## 2025-05-24 RX ADMIN — Medication 15 ML: at 08:35

## 2025-05-24 RX ADMIN — INSULIN ASPART 2 UNITS: 100 INJECTION, SOLUTION INTRAVENOUS; SUBCUTANEOUS at 21:09

## 2025-05-24 RX ADMIN — CARVEDILOL 6.25 MG: 3.12 TABLET, FILM COATED ORAL at 08:35

## 2025-05-24 RX ADMIN — HEPARIN SODIUM 5000 UNITS: 5000 INJECTION, SOLUTION INTRAVENOUS; SUBCUTANEOUS at 13:01

## 2025-05-24 RX ADMIN — Medication 1000 MCG: at 08:35

## 2025-05-24 RX ADMIN — INSULIN ASPART 1 UNITS: 100 INJECTION, SOLUTION INTRAVENOUS; SUBCUTANEOUS at 17:13

## 2025-05-24 RX ADMIN — INSULIN ASPART 2 UNITS: 100 INJECTION, SOLUTION INTRAVENOUS; SUBCUTANEOUS at 04:26

## 2025-05-24 RX ADMIN — LEVETIRACETAM 250 MG: 100 SOLUTION ORAL at 08:35

## 2025-05-24 RX ADMIN — ASPIRIN 81 MG CHEWABLE TABLET 324 MG: 81 TABLET CHEWABLE at 08:35

## 2025-05-24 RX ADMIN — LEVETIRACETAM 250 MG: 100 SOLUTION ORAL at 20:33

## 2025-05-24 RX ADMIN — GABAPENTIN 100 MG: 100 CAPSULE ORAL at 21:52

## 2025-05-24 RX ADMIN — INSULIN ASPART 4 UNITS: 100 INJECTION, SOLUTION INTRAVENOUS; SUBCUTANEOUS at 13:02

## 2025-05-24 RX ADMIN — DICLOFENAC SODIUM 2 G: 10 GEL TOPICAL at 08:33

## 2025-05-24 RX ADMIN — CARVEDILOL 6.25 MG: 3.12 TABLET, FILM COATED ORAL at 17:13

## 2025-05-24 RX ADMIN — HEPARIN SODIUM 5000 UNITS: 5000 INJECTION, SOLUTION INTRAVENOUS; SUBCUTANEOUS at 06:18

## 2025-05-24 RX ADMIN — DICLOFENAC SODIUM 2 G: 10 GEL TOPICAL at 20:32

## 2025-05-24 RX ADMIN — MICONAZOLE NITRATE: 20 CREAM TOPICAL at 08:33

## 2025-05-24 RX ADMIN — Medication 20 MG: at 08:35

## 2025-05-24 ASSESSMENT — ACTIVITIES OF DAILY LIVING (ADL)
ADLS_ACUITY_SCORE: 94
ADLS_ACUITY_SCORE: 98
ADLS_ACUITY_SCORE: 94
ADLS_ACUITY_SCORE: 98
ADLS_ACUITY_SCORE: 94
ADLS_ACUITY_SCORE: 98

## 2025-05-24 NOTE — PROGRESS NOTES
Inpatient Diabetes Management Service: Daily Progress Note     HPI: Isabell Matthews is a 64 year old female with history of  T1DM with diabetic neuropathy, CKD3b, HTN, HLD, orthostatic hypotension, seizures, vascular dementia, hypothyroidism, recurrent UTI's, and frequent falls who was admitted to [facility] 5/7/2025 with concern for stroke after presenting with acute neurologic symptoms. Hospital course has been complicated by labile BG and dysphagia, she continues to be NPO and continues to be on continuous TF.     IDS consulted to assist with glycemic management as well as optimization while inpatient and when applicable, recommendations for transition home.         Assessment/Plan:     Assessment:  Type 1 Diabetes Mellitus c/b peripheral neuropathy, nephropathy, Sub-optimal control. (A1c 8.8 % 3/23/25) in presence of complex illness and anemia.   Vascular dementia with acute cerebral infarcts new findings of dysarthria, dysphagia, decreased responsiveness, confusion   Labile BG 2/2 Tube feedings  BMI: 22    Plan/Recommendations:    ** patient has T1DM - requires insulin, basal dose must not to be withheld entirely OR for prolonged periods, high risk for DKA. If trending >250 mg/dL longer than 4 hours, please draw appropriate labs to determine if DKA and treat accordingly **     - Decrease NPH 15 --> 14 unit(s) at 0900 with tube feeding (motionBEAT inc 1.4 at 45 ml/hr). Hold if TF held.  - Increase NPH 5 --> 6 unit(s) at 2100 with tube feeding (motionBEAT inc 1.4 at 45 ml/hr). Hold if TF held.   - PRN D10 at 50 ml/hr if tube feeds are stopped/interrupted (dosed Desi [employer] Standard 1.4 @ goal 45 mL/hr) dosing provides 75% of CHO that would have been given by TF   - Lantus 8 units q 24 hrs at 1400 [DKA protective dose]  - Novolog Carb Coverage: 1 unit per 20 grams CHO TID with meals and PRN with snacks/supplements  - Novolog Correction Scale: 1:50>140 q4 hours (medium resistance)  - BG checks: q4hr  -  Hypoglycemia protocol    - Carb counting protocol     Per ADA guidelines, treatment goals and recommendations for older adults with DM and medically complexity is less stringent BG control/A1c for safety - targets of 110-180 mg/dL and up to 250 mg/dL reasonable - albeit not persistent for those with T1DM. Avoid reliance on A1c. Glucose decisions should be based on avoidance of hypoglycemia and symptomatic hyperglycemia.       Discussion:   Tight glycemic control yesterday. Will reduce NPH slightly. BG elevated overnight with unclear cause (not likely pt absorbing many carbs buccally, but could contribute to some variation). Will increase evening NPH back to 6 units. Small changes given hx of labile BG.     Discharge Planning: (tentative) --> likely TCU at discharge   Medications: TBD  - adjustment of PTA dosing with addition of NPH to cover tube feeds as indicated   Needs set doses for day program, would be able to use ICR at home.   Test Claims:  none needed.   Education:  Needs to be assessed closer to discharge.    Outpatient Follow-up: PCP- Dr Kellogg ( LOV 4/24/25), until able to establish with [facility]- has appointment with Mai Figueroa 8/4/25    Please notify Inpatient Diabetes Service if changes are planned to steroids, nutrition, TPN/TF and anticipated procedures requiring prolonged NPO status.         Interval History/Review of Systems :   - The last 24 hours progress and nursing notes reviewed.  - No acute events overnight.  - Care conference 5/23. Plan to continue TF and current cares while admitted. Next care conference planned for Tuesday 5/27.   - 1:1 supervision with meals. Pocketing food per RN report  - when asked how she was doing pt able to turn hand up and down.     Planned Procedures/Surgeries: none    Inpatient Glucose Control:       Recent Labs   Lab 05/24/25  0832 05/24/25  0422 05/24/25  0036 05/23/25  2047 05/23/25  1701 05/23/25  1206   * 233* 172* 154* 74 101*            Medications Impacting Glycemia:   Steroids: none  D5W containing solutions/medications: none   Other medications impacting glucose: none        Nutrition:   Orders Placed This Encounter      Pureed Diet (level 4) Thin Liquids (level 0)  Supplements:  none  TPN: none   TF:   5/13 - 5/17: been at Osmolite 1.5 goal of 40 ml/hr since 5/13, provides 195 g cho in 24hrs  5/17 - current: changed to continuous Chance (app) Standard 1.4 @ goal 45 mL/hr provides 170 g cho in 24 hrs         Diabetes History: see full consult note for complete diabetes history   Diabetes Type and Duration: DM for > 40 years, diagnosed in her 40s. Per daughter,  initially told she has T2DM then T1DM for a while then flipped back to being told T2DM.      6/17/2024 positive GAD65 antibody and c-peptide <0.1 but BG elevated     PTA Medication Regimen:   From discharge 3/30/25:- daughter not present to confirm   - Lantus 18 units once daily at HS  - Lispro ICR 1:15 TID AC, 1:20 PRN with snacks/supplements  - Lispro correction 1:50>150 TID AC, >200 HS     At discharge had discussed challenges around glucose lability and dosing: Lantus dose of 18 units may be too high if patient is not eating. However, daughter notes that with more dramatic changes patient will sometimes rise to 400s and has been in DKA before which is hard to balance. If she is concerned about mom going low at night, will try to give her a snack to support Lantus dosing. She is working to resume CGM to more closely monitor blood sugars.  Missing doses?: unknown, daughter not present to confirm   Historical Diabetes Medications: various insulins and dosing      Glucose monitoring device/frequency/trends: Historically has used glucometer before meals and at bedtime. Has tried and failed multiple CGMs (issues with accuracy or patient picking CGM off).   Current trends unknown as daughter not present to discuss- historically known to be labile with needs dropping dramatically when NPO     Hypoglycemia PTA:   - Frequency: currently unknown, but historically labile.   - Severity: + history of severe unconscious lows   - Awareness: variable, not intact    - Treatment: candy, juice       Outpatient Diabetes Provider: Current PCP: Dr Kellogg ( LOV 4/24/25) ; seen by Dr Bony Castaneda in the past (LOV 10/7/24)   Endo referral placed by PCP and IDS last admission, Scheduled with Mai Figueroa PAC 8/4/25   Formal Diabetes Education/Educator: none recently          Physical Exam:   /84 (BP Location: Left arm)   Pulse 82   Temp 98.1  F (36.7  C) (Oral)   Resp 16   Wt 59.4 kg (130 lb 15.3 oz)   SpO2 96%   BMI 23.20 kg/m    Constitutional: tired-appearing patient in no acute distress.  Eyes: sclera anicteric.  Respiratory: No signs of labored breathing.  Psych: minimally responsive. Able to nod and small hand gestures.          Data:     Lab Results   Component Value Date    A1C 8.8 (H) 03/23/2025    A1C 9.6 (H) 01/16/2025    A1C 11.2 (H) 09/23/2024    A1C 10.2 (H) 05/20/2024    A1C 10.7 (H) 01/29/2024    A1C 7.8 (H) 06/21/2021    A1C 11.7 (H) 09/27/2020     ROUTINE IP LABS (Last four results)  BMP  Recent Labs   Lab 05/24/25  0832 05/24/25  0422 05/24/25  0036 05/23/25  2047 05/23/25  1206 05/23/25  0553 05/21/25  0922 05/21/25  0551 05/19/25  0952 05/19/25  0620   NA  --   --   --   --   --  140  --  139  --  141   POTASSIUM  --   --   --   --   --  4.4  --  4.6  --  4.8   CHLORIDE  --   --   --   --   --  106  --  104  --  107   VERONICA  --   --   --   --   --  8.6*  --  8.5*  --  8.6*   CO2  --   --   --   --   --  26  --  25  --  24   BUN  --   --   --   --   --  31.0*  --  30.4*  --  29.4*   CR  --   --   --   --   --  1.43*  --  1.45*  --  1.50*   * 233* 172* 154*   < > 124*   < > 210*   < > 120*    < > = values in this interval not displayed.     CBC  Recent Labs   Lab 05/23/25  0553 05/21/25  0551 05/19/25  0620   WBC 6.6 5.3 6.5   RBC 3.58* 3.52* 3.65*   HGB 9.6* 9.6* 9.9*   HCT 31.2*  30.7* 31.7*   MCV 87 87 87   MCH 26.8 27.3 27.1   MCHC 30.8* 31.3* 31.2*   RDW 16.0* 15.8* 15.8*    214 220     Inpatient Diabetes Service will continue to follow, please don't hesitate to contact the team with any questions or concerns.     Mirela Anand PA-C  Inpatient Diabetes Service  Available on [employer] or Secure Chat     Please notify inpatient diabetes service if changes are planned to steroids, nutrition, or if procedures are planned requiring prolonged NPO status.Diabetes Management Team job code: [phone]    To contact Inpatient Diabetes Service:     7 AM - 5 PM: Page the IDS BULMARO following the patient that day (see filed or incomplete progress notes/consult notes under Endocrinology)    OR if uncertain of provider assignment: page job code [phone]    5 PM - 7 AM: First call after hours is to primary service.    For urgent after-hours questions, page job code for on call fellow: [phone]     I spent a total of 30 minutes on the date of the encounter doing prep/post-work, chart review, history and exam, documentation and further activities per the note including lab review, multidisciplinary communication, counseling the patient and/or coordinating care regarding acute hyper/hypoglycemic management, as well as discharge management and planning/communication.  See note for details.

## 2025-05-24 NOTE — PLAN OF CARE
"Goal Outcome Evaluation:      Plan of Care Reviewed With: patient    Overall Patient Progress: no changeOverall Patient Progress: no change    Outcome Evaluation: Pt is able to follow command, spoke briefly this shift, stated she wanted to use the bathroon but was helped to the commode with assist of x2. Has one small BM this shift. Denies pain at the start of shift but pointed to \"yes and no\" on the sheet of paper that was shown to patient. Had frequent urine output and incontinent pads were changed x 5 and pt was repositioned with each change. BG monitored q4h, last result was 233. Pt is able to use the call light.On TF thru NG tube at goal rate. Pt reported pain in her throat, MD was notified. Continue with POC.    "

## 2025-05-24 NOTE — PLAN OF CARE
incontinent of bladder, pt able to help repo q 2 hours, denies pain, able to shake head yes and no at times or point to the words, brace to left arm to be worn when using but can be left off when not using, redness to left wrist found under brace was thought to be pressure ulcer yesterday but due to vesicular appearance today swabbed for shingles which was negative, no BM today, little to no po intake but some bites with family, remains on TF at goal, blood sugars and insulin per order

## 2025-05-24 NOTE — PROGRESS NOTES
[facility]    Medicine Progress Note - Hospitalist Service, GOLD TEAM 6    Date of Admission:  5/7/2025    Assessment & Plan   Isabell Matthews is a 64 year old female with a past medical history of HTN, T1DM, HLD, diabetic neuropathy, orthostatic hypotension, seizures, vascular dementia, prior CVAs, hypothyroidism, recurrent UTIs, and frequent falls. She initially presented to the ED for evaluation of stroke-like symptoms and was admitted for further monitoring and management.     Updates for 5/24/2025:  - Check VZV PCR swab of L wrist rash  - Continue tube feeds for now, will transition off prior to discharge  - SW consulted for placement in hospice facility       Multifocal Acute Cerebral Infarcts  Hx of Multiple CVAs  Hx of Seizures  Pt initially presented w/ new findings of dysarthria, dysphagia, decreased responsiveness, confusion. However, while in the ED, no new focal neurologic sx were observed. Head CT negative for acute infarct or hemorrhage. CTA w/ multifocal stenosis involving L RADHA and R MCA which had progressed since 1/16/25. Stroke Neuro team consulted while in the ED, and loaded with Keppra on arrival. EEG negative. Initially, she was found to have a UTI, so there was concern that perhaps her presentation was d/t recrudescence of prior CVA deficits, however, MRI brain did show acute infarcts in R corona radiata, precentral gyrus, and operculum. Unfortunately, she was not a candidate for advanced therapies. Clinically stable. No new focal neurologic deficits.   - Stroke Neurology signed off  - Keppra 250mg BID   - Atorvastatin 40mg daily  - DAPT with ASA + Plavix x 90 days from 5/15 (then aspirin lifeliong)  - Palliative Care consulted, greatly appreciate assistance (see GOC below)    - Stroke Neuro follow-up in 8 weeks recommended, however patient planning to transition to hospice     Acute Metabolic Encephalopathy, multifactorial  Hx of Vascular  Dementia  Acute change in status noted on arrival, possibly 2/2 acute CVA vs metabolic encephalopathy. Likely multifactorial with UTI, dehydration, hyperglycemia, hypercapnia, and hospital environment contributing, as well as newfound acute CVA as above. No significant electrolyte abnormalities. No suspicious meds. TSH 0.14. Some clinical improvement noted. Patient no longer somnolent or encephalopathic, although her cognitive function does not appear intact. Family reports patient is close to baseline. Discussed concerns that swallowing issues more related to cognition.   - Bedside sitter discontinued since 5/15  - Monitor clinically  - Delirium precautions  - Continue melatonin 1mg at bedtime      UTI, recurrent, resolved  Urine culture from 5/1/25 grew pan-susceptible E.Coli. Afebrile on arrival, WBC normal. No other obvious source of infection. Repeat UA/UC negative. Completed 7 days of IV Ceftriaxone on 5/12.     Dysphagia (S/P NGT placement 5/10)  Patient failed initial swallow study. Likely in the setting of acute encephalopathy and acute stroke. SLP consulted, recommended strict NPO. NG placed 5/10, and has now begun TFs without issues. No desire to pursue PEG per goals of care discussion. NG tube feeds more of a temporary measure to see if swallowing will improve. SLP following. No plan for repeat VFSS as patient unable to participate from cognitive standpoint. SLP re-evaluated 5/22 at bedside -- patient retaining food/liquids in mouth for ~10 minutes despite multiple cues to swallow. When patient eventually swallows there is no dysphagia or aspiration. She is at increased risk for aspiration due to oral retention, otherwise swallow function appears to be intact. She would not be a candidate for further SLP interventions or exercises.  - Continue pureed diet with thin liquids and 1:1 supervision  - Calorie counts   - Continue TF at goal rate of 45mL/hr for now  - Will discuss coming off TF with family in  coming days, will not be continued on discharge if pursuing hospice     Hypernatremia, resolved  Free water deficit in setting of dysphagia and NPO status. Resolved with hypotonic fluids.   - Trend lytes daily      Possible Stress Cardiomyopathy  ECHO in the ED showed LVEF 50-55% with mid ventricular hypokinesis w/ preserved apical and mid segements c/w stress CM. No obvious signs of heart failure. Clinically, she has no s/sx to suggest acutely decompensated HF.  - Continue to monitor  - Continue Carvedilol w/ hold parameters     Type 1 Diabetes Mellitus  Hypoglycemia  A1c of 8.8% in 3/2025. BG in range of 300-400 this admission. NPO due to dysphagia. Hx of labile sugars and hypoglycemia in setting of infection. Home regimen: Lantus 18 units at bedtime. Endocrine consulted to assist with insulin dosing in setting of tube feeds. Overall BG well controlled. Does have intermittent hypoglycemia, but this has overall improved with adjustments to insulin regimen.  - Endocrine following, appreciate assistance              - Lantus to 8 units at 2000                 - NPH 14 units qAM + 6 units qPM              - Medium sliding scale insulin Q4H for now while NPO              - BG checks Q4H while NPO              - Hypoglycemia protocol              - PRN D10 at 60mL/hr if TFs are stopped/interrupted     Hypothyroidism  Most recent TSH 0.99. Repeat TSH here 0.14. On admission, daughter noted recent medication adjustments (brand synthroid vs generic) therefore may have been over-replaced. Cannot r/o sick euthyroid state as well.  - Continue PTA PO synthroid 88mcg      CKD stage IIIb  Baseline Cr of 1.4-1.6. Cr improved to 1.3-1.4 with IVF. Suspect chronic dehydration contributing.  - BMP daily for now  - Avoid nephrotoxic agents as best as possible     HTN  HLD  -180 on admission. Can allow for permissive hypertension in setting of acute stroke per Stroke Neuro.  - Continue PTA Carvedilol w/ hold parameters  -  Continue PTA Atorvastatin w/ enteral access via NG  - Transitioned SD ASA to PO ASA 324mg daily as above  - IV hydralazine PRN for SBP>180     Chronic Low Back Pain  - Diclofenac gel PRN  - Continue PTA Duloxetine, Gabapentin as now have enteral access     Seasonal allergies  - Hold PTA loratidine for now to reduce pill burden via NGT     Closed Colles' Fracture of L Wrist  Occurred in April, saw Ortho 4/22/25, but not deemed a surgical candidate d/t her medical comorbidities.  Per Ortho, they wanted her in brace and to be NWB status at L wrist. Case discussed with Ortho on 5/19. Repeat XR redemonstrated known comminuted intra-articular fracture of distal left radius.   - Ortho consulted  - Coffee cup weight bearing to L wrist.  - Thumb spica brace to L wrist pending ortho consult    Left Wrist Rash  Initially felt to be pressure injury from wrist brace. More vesicular appearance today.  - Check VZV PCR to rule out shingles  - WOCN consulted          Diet: Adult Formula Drip Feeding: Continuous [employer] Standard 1.4; Nasogastric tube; Goal Rate: 45 ( can start at goal ); mL/hr; Initiate at 10ml/hr and advance by 10ml Q8H as tolerated.; Do not advance tube feeding rate unless K+ is = or > 3.0, Mg++...  Pureed Diet (level 4) Thin Liquids (level 0)  Snacks/Supplements Adult: Other; Please send Glucerna with every bkf, lunch and dinner trays; With Meals  Snacks/Supplements Adult: Magic Cup; Between Meals  Calorie Counts    DVT Prophylaxis: Pneumatic Compression Devices  Parker Catheter: Not present  Lines: None     Cardiac Monitoring: None  Code Status: No CPR- Do NOT Intubate      Clinically Significant Risk Factors               # Hypoalbuminemia: Lowest albumin = 2.9 g/dL at 5/17/2025  6:01 AM, will monitor as appropriate     # Hypertension: Noted on problem list     # Dementia: noted on problem list         # Severe Malnutrition: based on nutrition assessment and treatment provided per dietitian's recommendations.     # Financial/Environmental Concerns:           Social Drivers of Health    Food Insecurity: Unknown (5/17/2025)    Food Insecurity     Within the past 12 months, did you worry that your food would run out before you got money to buy more?: Patient unable to answer     Within the past 12 months, did the food you bought just not last and you didn t have money to get more?: Patient unable to answer   Depression: At risk (4/2/2025)    PHQ-2     PHQ-2 Score: 4   Housing Stability: Unknown (5/17/2025)    Housing Stability     Do you have housing? : Patient unable to answer     Are you worried about losing your housing?: Patient unable to answer   Tobacco Use: Medium Risk (4/24/2025)    Patient History     Smoking Tobacco Use: Former     Smokeless Tobacco Use: Never   Financial Resource Strain: Unknown (5/17/2025)    Financial Resource Strain     Within the past 12 months, have you or your family members you live with been unable to get utilities (heat, electricity) when it was really needed?: Patient unable to answer   Transportation Needs: Unknown (5/17/2025)    Transportation Needs     Within the past 12 months, has lack of transportation kept you from medical appointments, getting your medicines, non-medical meetings or appointments, work, or from getting things that you need?: Patient unable to answer   Interpersonal Safety: Unknown (5/17/2025)    Interpersonal Safety     Do you feel physically and emotionally safe where you currently live?: Patient unable to answer     Within the past 12 months, have you been hit, slapped, kicked or otherwise physically hurt by someone?: Patient unable to answer     Within the past 12 months, have you been humiliated or emotionally abused in other ways by your partner or ex-partner?: Patient unable to answer          Disposition Plan     Medically Ready for Discharge: Ready Now           The patient's care was discussed with the Attending Physician, Dr. Palomares and Patient's  Family.    Javier Johnson PA-C  Hospitalist Service, GOLD TEAM 6  [facility]  Securely message with [employer] (more info)  Text page via [employer] Paging/Directory   See signed in provider for up to date coverage information  ______________________________________________________________________    Interval History   No acute events overnight.  Patient awakens to voice however falls back to sleep right away.  No acute concerns per nursing.    Physical Exam   Vital Signs: Temp: 98.1  F (36.7  C) Temp src: Oral BP: 126/84 Pulse: 82   Resp: 16 SpO2: 96 % O2 Device: None (Room air)    Weight: 130 lbs 15.25 oz    General Appearance: Awakens to voice but goes right back to sleep. Appears comfortable.  HEENT:  Unremarkable. NG tube intact.   Respiratory:  Normal effort. CTAB. No wheezing, rhonchi, rales.   Cardiovascular:  RRR. S1/S2. No murmurs.   GI:  Soft, non-distended, non-tender, +BS.   Extremity:  No pitting edema.   Skin:  Erythematous, possibly vesicular, rash on L wrist (radial flexor) and L hand (radial extensor).  Neuro:  Grossly non-focal.      Medical Decision Making       40 MINUTES SPENT BY ME on the date of service doing chart review, history, exam, documentation & further activities per the note.      Data         Imaging results reviewed over the past 24 hrs:   No results found for this or any previous visit (from the past 24 hours).

## 2025-05-25 LAB
ALBUMIN SERPL BCG-MCNC: 3.1 G/DL (ref 3.5–5.2)
ALP SERPL-CCNC: 225 U/L (ref 40–150)
ALT SERPL W P-5'-P-CCNC: 27 U/L (ref 0–50)
ANION GAP SERPL CALCULATED.3IONS-SCNC: 9 MMOL/L (ref 7–15)
AST SERPL W P-5'-P-CCNC: 32 U/L (ref 0–45)
BASOPHILS # BLD AUTO: 0 10E3/UL (ref 0–0.2)
BASOPHILS NFR BLD AUTO: 1 %
BILIRUB SERPL-MCNC: 0.2 MG/DL
BUN SERPL-MCNC: 28.8 MG/DL (ref 8–23)
CALCIUM SERPL-MCNC: 8.7 MG/DL (ref 8.8–10.4)
CHLORIDE SERPL-SCNC: 106 MMOL/L (ref 98–107)
CREAT SERPL-MCNC: 1.33 MG/DL (ref 0.51–0.95)
EGFRCR SERPLBLD CKD-EPI 2021: 44 ML/MIN/1.73M2
EOSINOPHIL # BLD AUTO: 0.2 10E3/UL (ref 0–0.7)
EOSINOPHIL NFR BLD AUTO: 3 %
ERYTHROCYTE [DISTWIDTH] IN BLOOD BY AUTOMATED COUNT: 15.9 % (ref 10–15)
GLUCOSE BLDC GLUCOMTR-MCNC: 109 MG/DL (ref 70–99)
GLUCOSE BLDC GLUCOMTR-MCNC: 198 MG/DL (ref 70–99)
GLUCOSE BLDC GLUCOMTR-MCNC: 225 MG/DL (ref 70–99)
GLUCOSE BLDC GLUCOMTR-MCNC: 225 MG/DL (ref 70–99)
GLUCOSE BLDC GLUCOMTR-MCNC: 230 MG/DL (ref 70–99)
GLUCOSE BLDC GLUCOMTR-MCNC: 289 MG/DL (ref 70–99)
GLUCOSE BLDC GLUCOMTR-MCNC: 89 MG/DL (ref 70–99)
GLUCOSE SERPL-MCNC: 176 MG/DL (ref 70–99)
HCO3 SERPL-SCNC: 25 MMOL/L (ref 22–29)
HCT VFR BLD AUTO: 32.4 % (ref 35–47)
HGB BLD-MCNC: 10.1 G/DL (ref 11.7–15.7)
IMM GRANULOCYTES # BLD: 0 10E3/UL
IMM GRANULOCYTES NFR BLD: 1 %
LYMPHOCYTES # BLD AUTO: 1.5 10E3/UL (ref 0.8–5.3)
LYMPHOCYTES NFR BLD AUTO: 19 %
MCH RBC QN AUTO: 27.7 PG (ref 26.5–33)
MCHC RBC AUTO-ENTMCNC: 31.2 G/DL (ref 31.5–36.5)
MCV RBC AUTO: 89 FL (ref 78–100)
MONOCYTES # BLD AUTO: 0.5 10E3/UL (ref 0–1.3)
MONOCYTES NFR BLD AUTO: 6 %
NEUTROPHILS # BLD AUTO: 5.6 10E3/UL (ref 1.6–8.3)
NEUTROPHILS NFR BLD AUTO: 71 %
NRBC # BLD AUTO: 0 10E3/UL
NRBC BLD AUTO-RTO: 0 /100
PLATELET # BLD AUTO: 236 10E3/UL (ref 150–450)
POTASSIUM SERPL-SCNC: 4.4 MMOL/L (ref 3.4–5.3)
PROT SERPL-MCNC: 5.8 G/DL (ref 6.4–8.3)
RBC # BLD AUTO: 3.64 10E6/UL (ref 3.8–5.2)
SODIUM SERPL-SCNC: 140 MMOL/L (ref 135–145)
WBC # BLD AUTO: 7.8 10E3/UL (ref 4–11)

## 2025-05-25 PROCEDURE — 36415 COLL VENOUS BLD VENIPUNCTURE: CPT

## 2025-05-25 PROCEDURE — 85004 AUTOMATED DIFF WBC COUNT: CPT

## 2025-05-25 PROCEDURE — 99232 SBSQ HOSP IP/OBS MODERATE 35: CPT | Mod: 24

## 2025-05-25 PROCEDURE — 250N000013 HC RX MED GY IP 250 OP 250 PS 637: Performed by: STUDENT IN AN ORGANIZED HEALTH CARE EDUCATION/TRAINING PROGRAM

## 2025-05-25 PROCEDURE — 82947 ASSAY GLUCOSE BLOOD QUANT: CPT

## 2025-05-25 PROCEDURE — 250N000009 HC RX 250: Performed by: STUDENT IN AN ORGANIZED HEALTH CARE EDUCATION/TRAINING PROGRAM

## 2025-05-25 PROCEDURE — 120N000002 HC R&B MED SURG/OB UMMC

## 2025-05-25 PROCEDURE — 250N000013 HC RX MED GY IP 250 OP 250 PS 637

## 2025-05-25 PROCEDURE — 250N000011 HC RX IP 250 OP 636

## 2025-05-25 PROCEDURE — 84155 ASSAY OF PROTEIN SERUM: CPT

## 2025-05-25 PROCEDURE — 85018 HEMOGLOBIN: CPT

## 2025-05-25 RX ADMIN — INSULIN ASPART 3 UNITS: 100 INJECTION, SOLUTION INTRAVENOUS; SUBCUTANEOUS at 23:39

## 2025-05-25 RX ADMIN — Medication 20 MG: at 08:07

## 2025-05-25 RX ADMIN — HEPARIN SODIUM 5000 UNITS: 5000 INJECTION, SOLUTION INTRAVENOUS; SUBCUTANEOUS at 14:25

## 2025-05-25 RX ADMIN — HEPARIN SODIUM 5000 UNITS: 5000 INJECTION, SOLUTION INTRAVENOUS; SUBCUTANEOUS at 05:54

## 2025-05-25 RX ADMIN — GABAPENTIN 100 MG: 100 CAPSULE ORAL at 21:43

## 2025-05-25 RX ADMIN — Medication 15 ML: at 08:06

## 2025-05-25 RX ADMIN — Medication 1000 MCG: at 08:08

## 2025-05-25 RX ADMIN — INSULIN ASPART 2 UNITS: 100 INJECTION, SOLUTION INTRAVENOUS; SUBCUTANEOUS at 20:23

## 2025-05-25 RX ADMIN — ATORVASTATIN CALCIUM 40 MG: 40 TABLET, FILM COATED ORAL at 08:08

## 2025-05-25 RX ADMIN — CARVEDILOL 6.25 MG: 3.12 TABLET, FILM COATED ORAL at 16:45

## 2025-05-25 RX ADMIN — DICLOFENAC SODIUM 2 G: 10 GEL TOPICAL at 08:08

## 2025-05-25 RX ADMIN — LEVETIRACETAM 250 MG: 100 SOLUTION ORAL at 20:24

## 2025-05-25 RX ADMIN — DICLOFENAC SODIUM 2 G: 10 GEL TOPICAL at 20:26

## 2025-05-25 RX ADMIN — CARVEDILOL 6.25 MG: 3.12 TABLET, FILM COATED ORAL at 08:08

## 2025-05-25 RX ADMIN — MICONAZOLE NITRATE: 20 CREAM TOPICAL at 08:08

## 2025-05-25 RX ADMIN — Medication 1 MG: at 21:43

## 2025-05-25 RX ADMIN — INSULIN ASPART 2 UNITS: 100 INJECTION, SOLUTION INTRAVENOUS; SUBCUTANEOUS at 08:07

## 2025-05-25 RX ADMIN — HEPARIN SODIUM 5000 UNITS: 5000 INJECTION, SOLUTION INTRAVENOUS; SUBCUTANEOUS at 21:43

## 2025-05-25 RX ADMIN — ASPIRIN 81 MG CHEWABLE TABLET 324 MG: 81 TABLET CHEWABLE at 08:06

## 2025-05-25 RX ADMIN — LEVOTHYROXINE SODIUM 88 MCG: 88 TABLET ORAL at 08:08

## 2025-05-25 RX ADMIN — LEVETIRACETAM 250 MG: 100 SOLUTION ORAL at 08:07

## 2025-05-25 RX ADMIN — MICONAZOLE NITRATE: 20 CREAM TOPICAL at 20:26

## 2025-05-25 RX ADMIN — INSULIN ASPART 2 UNITS: 100 INJECTION, SOLUTION INTRAVENOUS; SUBCUTANEOUS at 12:48

## 2025-05-25 RX ADMIN — INSULIN ASPART 2 UNITS: 100 INJECTION, SOLUTION INTRAVENOUS; SUBCUTANEOUS at 16:45

## 2025-05-25 RX ADMIN — CLOPIDOGREL BISULFATE 75 MG: 75 TABLET, FILM COATED ORAL at 08:08

## 2025-05-25 ASSESSMENT — ACTIVITIES OF DAILY LIVING (ADL)
ADLS_ACUITY_SCORE: 98
ADLS_ACUITY_SCORE: 89
ADLS_ACUITY_SCORE: 98
ADLS_ACUITY_SCORE: 89
ADLS_ACUITY_SCORE: 98
ADLS_ACUITY_SCORE: 89
ADLS_ACUITY_SCORE: 98
ADLS_ACUITY_SCORE: 89
ADLS_ACUITY_SCORE: 89
ADLS_ACUITY_SCORE: 98
ADLS_ACUITY_SCORE: 98
ADLS_ACUITY_SCORE: 94
ADLS_ACUITY_SCORE: 89
ADLS_ACUITY_SCORE: 98
ADLS_ACUITY_SCORE: 89
ADLS_ACUITY_SCORE: 98

## 2025-05-25 NOTE — PROGRESS NOTES
Calorie Count  Intake recorded for: 5/24  Total Kcals: 0 Total Protein: 0g  Kcals from Hospital Food: 0   Protein: 0g  Kcals from Outside Food (average):0 Protein: 0g  # Meals Ordered from Kitchen: 0 meals ordered  # Meals Recorded: 0 meals recorded  # Supplements Recorded: none recorded

## 2025-05-25 NOTE — PROGRESS NOTES
[facility]    Medicine Progress Note - Hospitalist Service, GOLD TEAM 6    Date of Admission:  5/7/2025    Assessment & Plan   Isabell Matthews is a 64 year old female with a past medical history of HTN, T1DM, HLD, diabetic neuropathy, orthostatic hypotension, seizures, vascular dementia, prior CVAs, hypothyroidism, recurrent UTIs, and frequent falls. She initially presented to the ED for evaluation of stroke-like symptoms and was admitted for further monitoring and management.     Updates for 5/25/2025:  - No other changes to regimen  - Discussed continuing TF with daughter (Vishal)  - Will plan to transition off TF closer to discharge if unable to continue at facility       Multifocal Acute Cerebral Infarcts  Hx of Multiple CVAs  Hx of Seizures  Pt initially presented w/ new findings of dysarthria, dysphagia, decreased responsiveness, confusion. However, while in the ED, no new focal neurologic sx were observed. Head CT negative for acute infarct or hemorrhage. CTA w/ multifocal stenosis involving L RADHA and R MCA which had progressed since 1/16/25. Stroke Neuro team consulted while in the ED, and loaded with Keppra on arrival. EEG negative. Initially, she was found to have a UTI, so there was concern that perhaps her presentation was d/t recrudescence of prior CVA deficits, however, MRI brain did show acute infarcts in R corona radiata, precentral gyrus, and operculum. Unfortunately, she was not a candidate for advanced therapies. Clinically stable. No new focal neurologic deficits.   - Stroke Neurology signed off  - Keppra 250mg BID   - Atorvastatin 40mg daily  - DAPT with ASA + Plavix x 90 days from 5/15 (then aspirin lifeliong)  - Palliative Care consulted, greatly appreciate assistance (see GOC below)    - Stroke Neuro follow-up in 8 weeks recommended, however patient planning to transition to hospice     Acute Metabolic Encephalopathy, multifactorial  Hx of Vascular  Dementia  Acute change in status noted on arrival, possibly 2/2 acute CVA vs metabolic encephalopathy. Likely multifactorial with UTI, dehydration, hyperglycemia, hypercapnia, and hospital environment contributing, as well as newfound acute CVA as above. No significant electrolyte abnormalities. No suspicious meds. TSH 0.14. Some clinical improvement noted. Patient no longer somnolent or encephalopathic, although her cognitive function does not appear intact. Family reports patient is close to baseline.   - Bedside sitter discontinued since 5/15  - Monitor clinically  - Delirium precautions  - Continue melatonin 1mg at bedtime      UTI, recurrent, resolved  Urine culture from 5/1/25 grew pan-susceptible E.Coli. Afebrile on arrival, WBC normal. No other obvious source of infection. Repeat UA/UC negative. Completed 7 days of IV Ceftriaxone on 5/12.     Dysphagia (S/P NGT placement 5/10)  Patient failed initial swallow study. Likely in the setting of acute encephalopathy and acute stroke. SLP consulted, recommended strict NPO. NG placed 5/10, and has now begun TFs without issues. No desire to pursue PEG per goals of care discussion. NG tube feeds more of a temporary measure to see if swallowing will improve. SLP following. No plan for repeat VFSS as patient unable to participate from cognitive standpoint. SLP re-evaluated 5/22 at bedside -- patient retaining food/liquids in mouth for ~10 minutes despite multiple cues to swallow. When patient eventually swallows there is no dysphagia or aspiration. She is at increased risk for aspiration due to oral retention, otherwise swallow function appears to be intact. She would not be a candidate for further SLP interventions or exercises.  - Continue pureed diet with thin liquids and 1:1 supervision  - Calorie counts   - Continue TF at goal rate of 45mL/hr for now  - Will discuss coming off TF with family in coming days, will not be continued on discharge if pursuing hospice      Hypernatremia, resolved  Free water deficit in setting of dysphagia and NPO status. Resolved with hypotonic fluids.   - Trend lytes daily      Possible Stress Cardiomyopathy  ECHO in the ED showed LVEF 50-55% with mid ventricular hypokinesis w/ preserved apical and mid segements c/w stress CM. No obvious signs of heart failure. Clinically, she has no s/sx to suggest acutely decompensated HF.  - Continue to monitor  - Continue Carvedilol w/ hold parameters     Type 1 Diabetes Mellitus  Hypoglycemia  A1c of 8.8% in 3/2025. BG in range of 300-400 this admission. NPO due to dysphagia. Hx of labile sugars and hypoglycemia in setting of infection. Home regimen: Lantus 18 units at bedtime. Endocrine consulted to assist with insulin dosing in setting of tube feeds. Overall BG well controlled. Does have intermittent hypoglycemia, but this has overall improved with adjustments to insulin regimen.  - Endocrine following, appreciate assistance              - Lantus to 8 units at 2000                 - NPH 14 units qAM + 6 units qPM              - Medium sliding scale insulin Q4H for now while NPO              - BG checks Q4H while NPO              - Hypoglycemia protocol              - PRN D10 at 60mL/hr if TFs are stopped/interrupted     Hypothyroidism  Most recent TSH 0.99. Repeat TSH here 0.14. On admission, daughter noted recent medication adjustments (brand synthroid vs generic) therefore may have been over-replaced. Cannot r/o sick euthyroid state as well.  - Continue PTA PO synthroid 88mcg      CKD stage IIIb  Baseline Cr of 1.4-1.6. Cr improved to 1.3-1.4 with IVF. Suspect chronic dehydration contributing.  - BMP daily for now  - Avoid nephrotoxic agents as best as possible     HTN  HLD  -180 on admission. Can allow for permissive hypertension in setting of acute stroke per Stroke Neuro.  - Continue PTA Carvedilol w/ hold parameters  - Continue PTA Atorvastatin w/ enteral access via NG  - Transitioned IN ASA to  PO ASA 324mg daily as above  - IV hydralazine PRN for SBP>180     Chronic Low Back Pain  - Diclofenac gel PRN  - Continue PTA Duloxetine, Gabapentin as now have enteral access     Seasonal allergies  - Hold PTA loratidine for now to reduce pill burden via NGT     Closed Colles' Fracture of L Wrist  Occurred in April, saw Ortho 4/22/25, but not deemed a surgical candidate d/t her medical comorbidities.  Per Ortho, they wanted her in brace and to be NWB status at L wrist. Case discussed with Ortho on 5/19. Repeat XR redemonstrated known comminuted intra-articular fracture of distal left radius.   - Ortho consulted  - Coffee cup weight bearing to L wrist.  - Thumb spica brace to L wrist pending ortho consult    Left Wrist Rash  Initially felt to be pressure injury from wrist brace. More vesicular appearance today.  - Check VZV PCR to rule out shingles  - WOCN consulted          Diet: Adult Formula Drip Feeding: Continuous [employer] Standard 1.4; Nasogastric tube; Goal Rate: 45 ( can start at goal ); mL/hr; Initiate at 10ml/hr and advance by 10ml Q8H as tolerated.; Do not advance tube feeding rate unless K+ is = or > 3.0, Mg++...  Pureed Diet (level 4) Thin Liquids (level 0)  Snacks/Supplements Adult: Other; Please send Glucerna with every bkf, lunch and dinner trays; With Meals  Snacks/Supplements Adult: Magic Cup; Between Meals  Calorie Counts    DVT Prophylaxis: Pneumatic Compression Devices  Parker Catheter: Not present  Lines: None     Cardiac Monitoring: None  Code Status: No CPR- Do NOT Intubate      Clinically Significant Risk Factors               # Hypoalbuminemia: Lowest albumin = 2.9 g/dL at 5/17/2025  6:01 AM, will monitor as appropriate     # Hypertension: Noted on problem list     # Dementia: noted on problem list         # Severe Malnutrition: based on nutrition assessment and treatment provided per dietitian's recommendations.    # Financial/Environmental Concerns:           Social Drivers of Health     Food Insecurity: Unknown (5/17/2025)    Food Insecurity     Within the past 12 months, did you worry that your food would run out before you got money to buy more?: Patient unable to answer     Within the past 12 months, did the food you bought just not last and you didn t have money to get more?: Patient unable to answer   Depression: At risk (4/2/2025)    PHQ-2     PHQ-2 Score: 4   Housing Stability: Unknown (5/17/2025)    Housing Stability     Do you have housing? : Patient unable to answer     Are you worried about losing your housing?: Patient unable to answer   Tobacco Use: Medium Risk (4/24/2025)    Patient History     Smoking Tobacco Use: Former     Smokeless Tobacco Use: Never   Financial Resource Strain: Unknown (5/17/2025)    Financial Resource Strain     Within the past 12 months, have you or your family members you live with been unable to get utilities (heat, electricity) when it was really needed?: Patient unable to answer   Transportation Needs: Unknown (5/17/2025)    Transportation Needs     Within the past 12 months, has lack of transportation kept you from medical appointments, getting your medicines, non-medical meetings or appointments, work, or from getting things that you need?: Patient unable to answer   Interpersonal Safety: Unknown (5/17/2025)    Interpersonal Safety     Do you feel physically and emotionally safe where you currently live?: Patient unable to answer     Within the past 12 months, have you been hit, slapped, kicked or otherwise physically hurt by someone?: Patient unable to answer     Within the past 12 months, have you been humiliated or emotionally abused in other ways by your partner or ex-partner?: Patient unable to answer          Disposition Plan     Medically Ready for Discharge: Ready Now           The patient's care was discussed with the Attending Physician, Dr. Palomares and Patient's Family.    Javier Johnson PA-C  Hospitalist Service[address]  [facility]  Securely message with [employer] (more info)  Text page via [facility] Paging/Directory   See signed in provider for up to date coverage information  ______________________________________________________________________    Interval History   No acute events overnight.  Nonverbal today.  No nursing concerns.     Physical Exam   Vital Signs: Temp: 97.3  F (36.3  C) Temp src: Oral BP: (!) 144/65 Pulse: 76   Resp: 16 SpO2: (!) 89 % O2 Device: None (Room air)    Weight: 130 lbs 15.25 oz    General Appearance: Awakens to voice but goes right back to sleep. Appears comfortable.  HEENT:  Unremarkable. NG tube intact.   Respiratory:  Normal effort. CTAB. No wheezing, rhonchi, rales.   Cardiovascular:  RRR. S1/S2. No murmurs.   GI:  Soft, non-distended, non-tender, +BS.   Extremity:  No pitting edema.   Neuro:  Grossly non-focal.      Medical Decision Making       30 MINUTES SPENT BY ME on the date of service doing chart review, history, exam, documentation & further activities per the note.      Data     I have personally reviewed the following data over the past 24 hrs:    7.8  \   10.1 (L)   / 236     140 106 28.8 (H) /  225 (H)   4.4 25 1.33 (H) \     ALT: 27 AST: 32 AP: 225 (H) TBILI: 0.2   ALB: 3.1 (L) TOT PROTEIN: 5.8 (L) LIPASE: N/A       Imaging results reviewed over the past 24 hrs:   No results found for this or any previous visit (from the past 24 hours).

## 2025-05-25 NOTE — PLAN OF CARE
Goal Outcome Evaluation:      Plan of Care Reviewed With: patient    Overall Patient Progress: no changeOverall Patient Progress: no change    Shift Hours: 1900 - 0700    Assessment:  Body systems that were not at patient's baseline Gastrointestinal and Safety. Focused body system assessments documented in flowsheets.        Activity     Fall Risk Score: 110   Bed alarm on? Yes     Activity Assistance Provided: assistance, 2 people      Assistive Device Utilized: walker, gait belt    Pain: Denies pain.    Labs/RN Managed Protocols: None    Lines/Drains: 2 R PIV SL    Nutrition: NPO, NG tube for T and meds.    Goal Outcome Evaluation     Overall Patient Progress: no change  Outcome Evaluation: Pt is able to follow command, denies pain, VSS on RA. Had frequent urine output and incontinent pads were changed and pt was repositioned with each change, had one small BM this shift. BG monitored q4h, last result was 109. Pt is able to use the call light.On TF thru NG tube at goal rate. Continue with POC.    Barriers to Discharge:   Care consult to determine placement.

## 2025-05-25 NOTE — PLAN OF CARE
incontinent of bladder, pt able to help repo q 2 hours, denies pain, able to shake head yes and no at times or point to the words, brace to left arm to be worn when using but can be left off when not using, no BM today, no po intake , remains on TF at goal, blood sugars and insulin per order

## 2025-05-25 NOTE — PROGRESS NOTES
Inpatient Diabetes Management Service: Daily Progress Note     HPI: Isabell Matthews is a 64 year old female with history of  T1DM with diabetic neuropathy, CKD3b, HTN, HLD, orthostatic hypotension, seizures, vascular dementia, hypothyroidism, recurrent UTI's, and frequent falls who was admitted to [facility] 5/7/2025 with concern for stroke after presenting with acute neurologic symptoms. Hospital course has been complicated by labile BG and dysphagia, she continues to be NPO and continues to be on continuous TF.     IDS consulted to assist with glycemic management as well as optimization while inpatient and when applicable, recommendations for transition home.         Assessment/Plan:     Assessment:  Type 1 Diabetes Mellitus c/b peripheral neuropathy, nephropathy, Sub-optimal control. (A1c 8.8 % 3/23/25) in presence of complex illness and anemia.   Vascular dementia with acute cerebral infarcts new findings of dysarthria, dysphagia, decreased responsiveness, confusion   Labile BG 2/2 Tube feedings  BMI: 22    Plan/Recommendations:    ** patient has T1DM - requires insulin, basal dose must not to be withheld entirely OR for prolonged periods, high risk for DKA. If trending >250 mg/dL longer than 4 hours, please draw appropriate labs to determine if DKA and treat accordingly **     - NPH 14 unit(s) at 0900 with tube feeding ([facility] 1.4 at 45 ml/hr). Hold if TF held.  - NPH 6 unit(s) at 2100 with tube feeding ([facility] 1.4 at 45 ml/hr). Hold if TF held.   - PRN D10 at 50 ml/hr if tube feeds are stopped/interrupted (dosed [employer] [facility] Standard 1.4 @ goal 45 mL/hr) dosing provides 75% of CHO that would have been given by TF   - Lantus 8 units q 24 hrs at 1400 [DKA protective dose]  - Novolog Carb Coverage: 1 unit per 20 grams CHO TID with meals and PRN with snacks/supplements  - Novolog Correction Scale: 1:50>140 q4 hours (medium resistance)  - BG checks: q4hr  - Hypoglycemia protocol    - Carb  counting protocol     Per ADA guidelines, treatment goals and recommendations for older adults with DM and medically complexity is less stringent BG control/A1c for safety - targets of 110-180 mg/dL and up to 250 mg/dL reasonable - albeit not persistent for those with T1DM. Avoid reliance on A1c. Glucose decisions should be based on avoidance of hypoglycemia and symptomatic hyperglycemia.       Discussion:   Hyperglycemic yesterday and tight control overnight. Had only increased evening NPH by 1 unit. Will continue current doses today to trend.     Discharge Planning: (tentative) --> likely TCU at discharge   Medications: TBD  - adjustment of PTA dosing with addition of NPH to cover tube feeds as indicated   Needs set doses for day program, would be able to use ICR at home.   Test Claims:  none needed.   Education:  Needs to be assessed closer to discharge.    Outpatient Follow-up: PCP- Dr Kellogg ( LOV 4/24/25), until able to establish with [facility]- has appointment with Mai Figueroa 8/4/25    Please notify Inpatient Diabetes Service if changes are planned to steroids, nutrition, TPN/TF and anticipated procedures requiring prolonged NPO status.         Interval History/Review of Systems :   - The last 24 hours progress and nursing notes reviewed.  - No acute events overnight.  - Care conference 5/23. Plan to continue TF and current cares while admitted. Next care conference planned for Tuesday 5/27.   - 1:1 supervision with meals. Little PO intake and will sometimes pocket food.   - incontinent of bladder    Planned Procedures/Surgeries: none    Inpatient Glucose Control:       Recent Labs   Lab 05/25/25  0806 05/25/25  0612 05/25/25  0415 05/25/25  0015 05/24/25  2227 05/24/25  2108   * 176* 109* 89 158* 218*           Medications Impacting Glycemia:   Steroids: none  D5W containing solutions/medications: none   Other medications impacting glucose: none        Nutrition:   Orders Placed This  Encounter      Pureed Diet (level 4) Thin Liquids (level 0)  Supplements:  none  TPN: none   TF:   5/13 - 5/17: been at [employer] 1.5 goal of 40 ml/hr since 5/13, provides 195 g cho in 24hrs  5/17 - current: changed to continuous [employer] Standard 1.4 @ goal 45 mL/hr provides 170 g cho in 24 hrs         Diabetes History: see full consult note for complete diabetes history   Diabetes Type and Duration: DM for > 40 years, diagnosed in her 40s. Per daughter,  initially told she has T2DM then T1DM for a while then flipped back to being told T2DM.      6/17/2024 positive GAD65 antibody and c-peptide <0.1 but BG elevated     PTA Medication Regimen:   From discharge 3/30/25:- daughter not present to confirm   - Lantus 18 units once daily at HS  - Lispro ICR 1:15 TID AC, 1:20 PRN with snacks/supplements  - Lispro correction 1:50>150 TID AC, >200 HS     At discharge had discussed challenges around glucose lability and dosing: Lantus dose of 18 units may be too high if patient is not eating. However, daughter notes that with more dramatic changes patient will sometimes rise to 400s and has been in DKA before which is hard to balance. If she is concerned about mom going low at night, will try to give her a snack to support Lantus dosing. She is working to resume CGM to more closely monitor blood sugars.  Missing doses?: unknown, daughter not present to confirm   Historical Diabetes Medications: various insulins and dosing      Glucose monitoring device/frequency/trends: Historically has used glucometer before meals and at bedtime. Has tried and failed multiple CGMs (issues with accuracy or patient picking CGM off).   Current trends unknown as daughter not present to discuss- historically known to be labile with needs dropping dramatically when NPO    Hypoglycemia PTA:   - Frequency: currently unknown, but historically labile.   - Severity: + history of severe unconscious lows   - Awareness: variable, not intact    -  Treatment: candy, juice       Outpatient Diabetes Provider: Current PCP: Dr Kellogg ( LOV 4/24/25) ; seen by Dr Bony Castaneda in the past (LOV 10/7/24)   Endo referral placed by PCP and IDS last admission, Scheduled with NEGRA Ramirez 8/4/25   Formal Diabetes Education/Educator: none recently          Physical Exam:   BP (!) 144/65 (BP Location: Left arm)   Pulse 74   Temp 97.3  F (36.3  C) (Axillary)   Resp 18   Wt 59.4 kg (130 lb 15.3 oz)   SpO2 96%   BMI 23.20 kg/m    Constitutional: tired-appearing patient in no acute distress.  Eyes: sclera anicteric.  Respiratory: No signs of labored breathing.  Psych: minimally responsive. Able to nod and small hand gestures.          Data:     Lab Results   Component Value Date    A1C 8.8 (H) 03/23/2025    A1C 9.6 (H) 01/16/2025    A1C 11.2 (H) 09/23/2024    A1C 10.2 (H) 05/20/2024    A1C 10.7 (H) 01/29/2024    A1C 7.8 (H) 06/21/2021    A1C 11.7 (H) 09/27/2020     ROUTINE IP LABS (Last four results)  BMP  Recent Labs   Lab 05/25/25  0806 05/25/25  0612 05/25/25  0415 05/25/25  0015 05/23/25  1206 05/23/25  0553 05/21/25  0922 05/21/25  0551 05/19/25  0952 05/19/25  0620   NA  --  140  --   --   --  140  --  139  --  141   POTASSIUM  --  4.4  --   --   --  4.4  --  4.6  --  4.8   CHLORIDE  --  106  --   --   --  106  --  104  --  107   VERONICA  --  8.7*  --   --   --  8.6*  --  8.5*  --  8.6*   CO2  --  25  --   --   --  26  --  25  --  24   BUN  --  28.8*  --   --   --  31.0*  --  30.4*  --  29.4*   CR  --  1.33*  --   --   --  1.43*  --  1.45*  --  1.50*   * 176* 109* 89   < > 124*   < > 210*   < > 120*    < > = values in this interval not displayed.     CBC  Recent Labs   Lab 05/25/25  0612 05/23/25  0553 05/21/25  0551 05/19/25  0620   WBC 7.8 6.6 5.3 6.5   RBC 3.64* 3.58* 3.52* 3.65*   HGB 10.1* 9.6* 9.6* 9.9*   HCT 32.4* 31.2* 30.7* 31.7*   MCV 89 87 87 87   MCH 27.7 26.8 27.3 27.1   MCHC 31.2* 30.8* 31.3* 31.2*   RDW 15.9* 16.0* 15.8* 15.8*    227  214 220     Inpatient Diabetes Service will continue to follow, please don't hesitate to contact the team with any questions or concerns.     Mirela Anand PA-C  Inpatient Diabetes Service  Available on [employer] or Secure Chat     Please notify inpatient diabetes service if changes are planned to steroids, nutrition, or if procedures are planned requiring prolonged NPO status.Diabetes Management Team job code: [phone]    To contact Inpatient Diabetes Service:     7 AM - 5 PM: Page the IDS BULMARO following the patient that day (see filed or incomplete progress notes/consult notes under Endocrinology)    OR if uncertain of provider assignment: page job code [phone]    5 PM - 7 AM: First call after hours is to primary service.    For urgent after-hours questions, page job code for on call fellow: [phone]     I spent a total of 35 minutes on the date of the encounter doing prep/post-work, chart review, history and exam, documentation and further activities per the note including lab review, multidisciplinary communication, counseling the patient and/or coordinating care regarding acute hyper/hypoglycemic management, as well as discharge management and planning/communication.  See note for details.

## 2025-05-26 LAB
GLUCOSE BLDC GLUCOMTR-MCNC: 137 MG/DL (ref 70–99)
GLUCOSE BLDC GLUCOMTR-MCNC: 150 MG/DL (ref 70–99)
GLUCOSE BLDC GLUCOMTR-MCNC: 182 MG/DL (ref 70–99)
GLUCOSE BLDC GLUCOMTR-MCNC: 190 MG/DL (ref 70–99)
GLUCOSE BLDC GLUCOMTR-MCNC: 198 MG/DL (ref 70–99)
GLUCOSE BLDC GLUCOMTR-MCNC: 253 MG/DL (ref 70–99)

## 2025-05-26 PROCEDURE — 99231 SBSQ HOSP IP/OBS SF/LOW 25: CPT | Mod: 24

## 2025-05-26 PROCEDURE — 250N000009 HC RX 250: Performed by: STUDENT IN AN ORGANIZED HEALTH CARE EDUCATION/TRAINING PROGRAM

## 2025-05-26 PROCEDURE — 250N000011 HC RX IP 250 OP 636

## 2025-05-26 PROCEDURE — 250N000013 HC RX MED GY IP 250 OP 250 PS 637

## 2025-05-26 PROCEDURE — 250N000013 HC RX MED GY IP 250 OP 250 PS 637: Performed by: STUDENT IN AN ORGANIZED HEALTH CARE EDUCATION/TRAINING PROGRAM

## 2025-05-26 PROCEDURE — 99233 SBSQ HOSP IP/OBS HIGH 50: CPT | Performed by: PHYSICIAN ASSISTANT

## 2025-05-26 PROCEDURE — 120N000002 HC R&B MED SURG/OB UMMC

## 2025-05-26 RX ADMIN — LEVETIRACETAM 250 MG: 100 SOLUTION ORAL at 20:10

## 2025-05-26 RX ADMIN — MICONAZOLE NITRATE: 20 CREAM TOPICAL at 20:10

## 2025-05-26 RX ADMIN — MICONAZOLE NITRATE: 20 CREAM TOPICAL at 08:08

## 2025-05-26 RX ADMIN — INSULIN ASPART 1 UNITS: 100 INJECTION, SOLUTION INTRAVENOUS; SUBCUTANEOUS at 23:40

## 2025-05-26 RX ADMIN — INSULIN ASPART 3 UNITS: 100 INJECTION, SOLUTION INTRAVENOUS; SUBCUTANEOUS at 04:24

## 2025-05-26 RX ADMIN — DICLOFENAC SODIUM 2 G: 10 GEL TOPICAL at 20:10

## 2025-05-26 RX ADMIN — Medication 15 ML: at 08:02

## 2025-05-26 RX ADMIN — Medication 20 MG: at 08:00

## 2025-05-26 RX ADMIN — INSULIN ASPART 2 UNITS: 100 INJECTION, SOLUTION INTRAVENOUS; SUBCUTANEOUS at 12:30

## 2025-05-26 RX ADMIN — DICLOFENAC SODIUM 2 G: 10 GEL TOPICAL at 11:06

## 2025-05-26 RX ADMIN — ATORVASTATIN CALCIUM 40 MG: 40 TABLET, FILM COATED ORAL at 08:02

## 2025-05-26 RX ADMIN — DICLOFENAC SODIUM 2 G: 10 GEL TOPICAL at 16:07

## 2025-05-26 RX ADMIN — HEPARIN SODIUM 5000 UNITS: 5000 INJECTION, SOLUTION INTRAVENOUS; SUBCUTANEOUS at 14:16

## 2025-05-26 RX ADMIN — HEPARIN SODIUM 5000 UNITS: 5000 INJECTION, SOLUTION INTRAVENOUS; SUBCUTANEOUS at 22:11

## 2025-05-26 RX ADMIN — CARVEDILOL 6.25 MG: 3.12 TABLET, FILM COATED ORAL at 18:12

## 2025-05-26 RX ADMIN — INSULIN ASPART 2 UNITS: 100 INJECTION, SOLUTION INTRAVENOUS; SUBCUTANEOUS at 08:02

## 2025-05-26 RX ADMIN — HEPARIN SODIUM 5000 UNITS: 5000 INJECTION, SOLUTION INTRAVENOUS; SUBCUTANEOUS at 06:24

## 2025-05-26 RX ADMIN — LEVOTHYROXINE SODIUM 88 MCG: 88 TABLET ORAL at 08:09

## 2025-05-26 RX ADMIN — LEVETIRACETAM 250 MG: 100 SOLUTION ORAL at 08:00

## 2025-05-26 RX ADMIN — Medication 1000 MCG: at 08:07

## 2025-05-26 RX ADMIN — CARVEDILOL 6.25 MG: 3.12 TABLET, FILM COATED ORAL at 08:01

## 2025-05-26 RX ADMIN — CLOPIDOGREL BISULFATE 75 MG: 75 TABLET, FILM COATED ORAL at 08:02

## 2025-05-26 RX ADMIN — INSULIN ASPART 1 UNITS: 100 INJECTION, SOLUTION INTRAVENOUS; SUBCUTANEOUS at 16:24

## 2025-05-26 RX ADMIN — ASPIRIN 81 MG CHEWABLE TABLET 324 MG: 81 TABLET CHEWABLE at 08:02

## 2025-05-26 RX ADMIN — GABAPENTIN 100 MG: 100 CAPSULE ORAL at 22:11

## 2025-05-26 RX ADMIN — Medication 1 MG: at 22:11

## 2025-05-26 RX ADMIN — DICLOFENAC SODIUM 2 G: 10 GEL TOPICAL at 08:02

## 2025-05-26 ASSESSMENT — ACTIVITIES OF DAILY LIVING (ADL)
ADLS_ACUITY_SCORE: 94
ADLS_ACUITY_SCORE: 89
ADLS_ACUITY_SCORE: 94
ADLS_ACUITY_SCORE: 97
ADLS_ACUITY_SCORE: 98
ADLS_ACUITY_SCORE: 97
ADLS_ACUITY_SCORE: 94
ADLS_ACUITY_SCORE: 89
ADLS_ACUITY_SCORE: 94
ADLS_ACUITY_SCORE: 97
ADLS_ACUITY_SCORE: 98
ADLS_ACUITY_SCORE: 94
ADLS_ACUITY_SCORE: 94
ADLS_ACUITY_SCORE: 98
ADLS_ACUITY_SCORE: 94
ADLS_ACUITY_SCORE: 98
ADLS_ACUITY_SCORE: 97
ADLS_ACUITY_SCORE: 94
ADLS_ACUITY_SCORE: 98
ADLS_ACUITY_SCORE: 98
ADLS_ACUITY_SCORE: 94

## 2025-05-26 NOTE — PLAN OF CARE
Goal Outcome Evaluation:   Pt is able to follow command, denies pain, VSS on RA. Incontinent pads changed with every urine output. BG monitored q4h, last result was 253. Pt is able to use the call light. NPO, on TF through NG tube at goal rate. Continue with POC.

## 2025-05-26 NOTE — PROGRESS NOTES
[facility]    Medicine Progress Note - Hospitalist Service, GOLD TEAM 6    Date of Admission:  5/7/2025    Assessment & Plan   Isabell Matthews is a 64 year old female admitted on 5/7/2025. She has a past medical history significant for HTN, T1DM c/b diabetic neuropathy, HLD, orthostatic hypotension, seizures, vascular dementia, prior CVAs, hypothyroidism, recurrent UTIs, and frequent falls. She initially presented to the ED for evaluation of stroke-like symptoms. MRI completed showing acute infarcts for which she was not a candidate for advanced therapies.     Multifocal acute cerebral infarcts  Hx of multiple CVAs  Hx of seizures  Presented w/ new findings of dysarthria, dysphagia, decreased responsiveness, confusion. CT head negative for acute infarct or hemorrhage. CTA w/ multifocal stenosis involving L RADHA and R MCA which had progressed since 1/16/25. Stroke Neuro team consulted while in the ED. Loaded with keppra. EEG negative. Found to have UTI so concern presentation was recrudescence of prior CVA deficits however MRI brain obtained showing acute infarcts in R corona radiata, precentral gyrus and operculum. Not a candidate for advanced therapies. Palliative care consulted this admission given poor neuro recovery with plans for hospice placement.   - Keppra 250mg BID   - Atorvastatin 40mg daily  - DAPT with ASA + Plavix x 90 days from 5/15 (then aspirin lifeliong)  - Palliative Care consulted, greatly appreciate assistance   - Stroke Neuro follow-up in 8 weeks recommended, however patient planning to transition to hospice  - SW following for hospice placement      Acute metabolic encephalopathy, multifactorial  Hx of vascular dementia  Acute change in status noted on arrival, possibly 2/2 acute CVA vs metabolic encephalopathy. Likely multifactorial with UTI, dehydration, hyperglycemia, hypercapnia, and hospital environment contributing, as well as newfound acute CVA  as above. No significant electrolyte abnormalities. No suspicious meds. TSH 0.14. Some clinical improvement noted. Patient no longer somnolent or encephalopathic, although her cognitive function does not appear intact. Family reports patient is close to baseline.   - Bedside sitter discontinued since 5/15  - Monitor clinically  - Delirium precautions  - Continue melatonin 1mg at bedtime     Dysphagia (S/P NGT placement 5/10)  Patient failed initial swallow study. Likely in the setting of acute encephalopathy and acute stroke. SLP consulted, recommended strict NPO. NG placed 5/10, and has now begun TFs without issues. No desire to pursue PEG per goals of care discussion. NG tube feeds more of a temporary measure to see if swallowing will improve. SLP re-evaluated 5/22 at bedside -- patient retaining food/liquids in mouth for ~10 minutes despite multiple cues to swallow. When patient eventually swallows there is no dysphagia or aspiration. She is at increased risk for aspiration due to oral retention, otherwise swallow function appears to be intact. She would not be a candidate for further SLP interventions or exercises.  - Continue pureed diet with thin liquids and 1:1 supervision  - Calorie counts   - Continue TF at goal rate of 45mL/hr for now  - Will discuss coming off TF with family in coming days, will not be continued on discharge if pursuing hospice      Possible stress cardiomyopathy  Echo this admission with LVEF 50-55% with mid ventricular hypokinesis w/ preserved apical and mid segements c/w stress CM. No obvious signs of heart failure. Clinically, she has no s/sx to suggest acutely decompensated HF.  - Continue to monitor  - Continue carvedilol w/ hold parameters     DM1  Hypoglycemia  A1c of 8.8% in 3/2025. Had been NPO due to to dysphagia and now on TF. Labile blood sugars and hypoglycemia noted in setting of infection. PTA regimen includes lantus 18 units at bedtime.   - Endocrine following, appreciate  assistance  - Lantus 8 units daily at 1200  - NPH 15 units qAM + 7 units q PM   - Novolog carb coverage with 1 unit per 20 g CHO  - Medium intensity sliding scale q4hh while on TF  - BG checks q4h, hypoglycemia protocol   - PRN D10 at 50 mL/hr if tube feeds are stopped or interrupted                 Hypothyroidism  Most recent TSH 0.99. Repeat TSH here 0.14. On admission, daughter noted recent medication adjustments (brand synthroid vs generic) therefore may have been over-replaced. Cannot r/o sick euthyroid state as well.  - Continue PTA PO synthroid 88mcg      CKD stage IIIb  Baseline Cr of 1.4-1.6. Cr improved to 1.3-1.4 with IVF. Suspect chronic dehydration contributing.  - Check BMP intermittently while inpatient      HTN  HLD  -180 on admission. Can allow for permissive hypertension in setting of acute stroke per Stroke Neuro.  - Continue PTA carvedilol 6.25 mg BID w/ hold parameters  - Atorvastatin, ASA, plavix as above   - IV hydralazine PRN for SBP>180     Chronic low back pain  - Diclofenac gel PRN  - Continue PTA duloxetine 20 mg daily, gabapentin 100 mg at bedtime      Seasonal allergies  - Hold PTA loratidine for now to reduce pill burden via NGT     Closed Colles' Fracture of L Wrist  Occurred in April, saw Ortho 4/22/25, but not deemed a surgical candidate d/t her medical comorbidities. Per Ortho, they wanted her in brace and to be NWB status at L wrist. Repeat XR redemonstrated known comminuted intra-articular fracture of distal left radius. Ortho consulted with recommendations for hand therapy and progressing ROM as tolerate out of brace.   - Coffee cup weight bearing to L wrist.  - Hand therapy if able, ROM as tolerated out of brace     Left wrist rash  Initially felt to be pressure injury from wrist brace. Concern it developed vesicular appearance however VZV negative.   - WOCN consulted    Hypernatremia, resolved  Free water deficit in setting of dysphagia and NPO status. Resolved with  hypotonic fluids.     UTI, recurrent, resolved  Urine culture from 5/1/25 grew pan-susceptible E.Coli. Afebrile on arrival, WBC normal. No other obvious source of infection. Repeat UA/UC negative. Completed 7 days of IV Ceftriaxone on 5/12.             Diet: Adult Formula Drip Feeding: Continuous Effdon Standard 1.4; Nasogastric tube; Goal Rate: 45 ( can start at goal ); mL/hr; Initiate at 10ml/hr and advance by 10ml Q8H as tolerated.; Do not advance tube feeding rate unless K+ is = or > 3.0, Mg++...  Pureed Diet (level 4) Thin Liquids (level 0)  Snacks/Supplements Adult: Other; Please send Glucerna with every bkf, lunch and dinner trays; With Meals  Snacks/Supplements Adult: Magic Cup; Between Meals  Calorie Counts    DVT Prophylaxis: Heparin SQ  Parker Catheter: Not present  Lines: None     Cardiac Monitoring: None  Code Status: No CPR- Do NOT Intubate      Clinically Significant Risk Factors               # Hypoalbuminemia: Lowest albumin = 2.9 g/dL at 5/17/2025  6:01 AM, will monitor as appropriate     # Hypertension: Noted on problem list     # Dementia: noted on problem list         # Severe Malnutrition: based on nutrition assessment and treatment provided per dietitian's recommendations.    # Financial/Environmental Concerns:           Social Drivers of Health    Food Insecurity: Unknown (5/17/2025)    Food Insecurity     Within the past 12 months, did you worry that your food would run out before you got money to buy more?: Patient unable to answer     Within the past 12 months, did the food you bought just not last and you didn t have money to get more?: Patient unable to answer   Depression: At risk (4/2/2025)    PHQ-2     PHQ-2 Score: 4   Housing Stability: Unknown (5/17/2025)    Housing Stability     Do you have housing? : Patient unable to answer     Are you worried about losing your housing?: Patient unable to answer   Tobacco Use: Medium Risk (4/24/2025)    Patient History     Smoking Tobacco Use:  Former     Smokeless Tobacco Use: Never   Financial Resource Strain: Unknown (5/17/2025)    Financial Resource Strain     Within the past 12 months, have you or your family members you live with been unable to get utilities (heat, electricity) when it was really needed?: Patient unable to answer   Transportation Needs: Unknown (5/17/2025)    Transportation Needs     Within the past 12 months, has lack of transportation kept you from medical appointments, getting your medicines, non-medical meetings or appointments, work, or from getting things that you need?: Patient unable to answer   Interpersonal Safety: Unknown (5/17/2025)    Interpersonal Safety     Do you feel physically and emotionally safe where you currently live?: Patient unable to answer     Within the past 12 months, have you been hit, slapped, kicked or otherwise physically hurt by someone?: Patient unable to answer     Within the past 12 months, have you been humiliated or emotionally abused in other ways by your partner or ex-partner?: Patient unable to answer          Disposition Plan     Medically Ready for Discharge: Ready Now           The patient's care was discussed with the Attending Physician, Dr. Foster and Bedside Nurse.    Sabina Moore PA-C  Hospitalist Service, GOLD TEAM [address] [place] [id number]  Securely message with [employer] (more info)  Text page via [facility] Paging/Directory   See signed in provider for up to date coverage information  ______________________________________________________________________    Interval History   No acute events overnight. Minimally conversant. Denied any complaints or pain.     Physical Exam   Vital Signs: Temp: 97.5  F (36.4  C) Temp src: Axillary BP: 121/76 Pulse: 88   Resp: 16 SpO2: 95 % O2 Device: None (Room air)    Weight: 132 lbs 4.42 oz    General Appearance: Sleeping. Wakes briefly to voice. Appears comfortable.   Respiratory: Normal work of breathing on room  air. Lungs CTAB.   Cardiovascular: RRR. S1, S2. No murmurs.   GI: Abdomen non-distended. Normoactive. Soft, nontender. No guarding.   Skin: Warm, dry.     Medical Decision Making       55 MINUTES SPENT BY ME on the date of service doing chart review, history, exam, documentation & further activities per the note.      Data         Imaging results reviewed over the past 24 hrs:   No results found for this or any previous visit (from the past 24 hours).

## 2025-05-26 NOTE — PLAN OF CARE
Patient alert, able to follow commands, denies pain. Incontinent of bladder. Turned and repositioned q2h. TF running at 45ml/hr FWF 140ml/hr. Call light within reach.    Goal Outcome Evaluation:      Plan of Care Reviewed With: patient    Overall Patient Progress: no changeOverall Patient Progress: no change

## 2025-05-26 NOTE — PROGRESS NOTES
Calorie Count  Intake recorded for: 5/25  Total Kcals: 0 Total Protein: 0g  Kcals from Hospital Food: 0   Protein: 0g  Kcals from Outside Food (average):0 Protein: 0g  # Meals Ordered from Kitchen: 0  # Meals Recorded: 0  # Supplements Recorded: 0

## 2025-05-26 NOTE — PROGRESS NOTES
Inpatient Diabetes Management Service: Daily Progress Note     HPI: Isabell Matthews is a 64 year old female with history of  T1DM with diabetic neuropathy, CKD3b, HTN, HLD, orthostatic hypotension, seizures, vascular dementia, hypothyroidism, recurrent UTI's, and frequent falls who was admitted to [facility] 5/7/2025 with concern for stroke after presenting with acute neurologic symptoms. Hospital course has been complicated by labile BG and dysphagia, she continues to be NPO and continues to be on continuous TF.     IDS consulted to assist with glycemic management as well as optimization while inpatient and when applicable, recommendations for transition home.         Assessment/Plan:     Assessment:  Type 1 Diabetes Mellitus c/b peripheral neuropathy, nephropathy, Sub-optimal control. (A1c 8.8 % 3/23/25) in presence of complex illness and anemia.   Vascular dementia with acute cerebral infarcts new findings of dysarthria, dysphagia, decreased responsiveness, confusion   Labile BG 2/2 Tube feedings  BMI: 22    Plan/Recommendations:    ** patient has T1DM - requires insulin, basal dose must not to be withheld entirely OR for prolonged periods, high risk for DKA. If trending >250 mg/dL longer than 4 hours, please draw appropriate labs to determine if DKA and treat accordingly **     - Change NPH 14 --> 15 unit(s) at 0900 with tube feeding (Promobucket 1.4 at 45 ml/hr). Hold if TF held.  - Change NPH 6 --> 7 unit(s) at 2100 with tube feeding (Promobucket 1.4 at 45 ml/hr). Hold if TF held.   - PRN D10 at 50 ml/hr if tube feeds are stopped/interrupted (dosed Desi [employer] Standard 1.4 @ goal 45 mL/hr) dosing provides 75% of CHO that would have been given by TF   - Lantus 8 units q 24 hrs at 1400 [DKA protective dose]  - Novolog Carb Coverage: 1 unit per 20 grams CHO TID with meals and PRN with snacks/supplements  - Novolog Correction Scale: 1:50>140 q4 hours (medium resistance)  - BG checks: q4hr  -  Hypoglycemia protocol    - Carb counting protocol     Per ADA guidelines, treatment goals and recommendations for older adults with DM and medically complexity is less stringent BG control/A1c for safety - targets of 110-180 mg/dL and up to 250 mg/dL reasonable - albeit not persistent for those with T1DM. Avoid reliance on A1c. Glucose decisions should be based on avoidance of hypoglycemia and symptomatic hyperglycemia.       Discussion:   Global hyperglycemia yesterday. Will increase NPH slightly.     Discharge Planning: (tentative) --> likely TCU at discharge   Medications: TBD  - adjustment of PTA dosing with addition of NPH to cover tube feeds as indicated   Needs set doses for day program, would be able to use ICR at home.   Test Claims:  none needed.   Education:  Needs to be assessed closer to discharge.    Outpatient Follow-up: PCP- Dr Kellogg ( LOV 4/24/25), until able to establish with [facility] Endocrinology- has appointment with Mai Figueroa 8/4/25    Please notify Inpatient Diabetes Service if changes are planned to steroids, nutrition, TPN/TF and anticipated procedures requiring prolonged NPO status.         Interval History/Review of Systems :   - The last 24 hours progress and nursing notes reviewed.  - No acute events overnight.  - Care conference 5/23. Plan to continue TF and current cares while admitted. Next care conference planned for Tuesday 5/27.   - 1:1 supervision with meals. Little PO intake and will sometimes pocket food.   - incontinent of bladder    Planned Procedures/Surgeries: none    Inpatient Glucose Control:       Recent Labs   Lab 05/26/25  0754 05/26/25  0415 05/25/25  2338 05/25/25  2022 05/25/25  1644 05/25/25  1244   * 253* 289* 230* 198* 225*           Medications Impacting Glycemia:   Steroids: none  D5W containing solutions/medications: none   Other medications impacting glucose: none        Nutrition:   Orders Placed This Encounter      Pureed Diet (level 4) Thin  Liquids (level 0)  Supplements:  none  TPN: none   TF:   5/13 - 5/17: been at Osm[employer] 1.5 goal of 40 ml/hr since 5/13, provides 195 g cho in 24hrs  5/17 - current: changed to continuous [employer] Standard 1.4 @ goal 45 mL/hr provides 170 g cho in 24 hrs         Diabetes History: see full consult note for complete diabetes history   Diabetes Type and Duration: DM for > 40 years, diagnosed in her 40s. Per daughter,  initially told she has T2DM then T1DM for a while then flipped back to being told T2DM.      6/17/2024 positive GAD65 antibody and c-peptide <0.1 but BG elevated     PTA Medication Regimen:   From discharge 3/30/25:- daughter not present to confirm   - Lantus 18 units once daily at HS  - Lispro ICR 1:15 TID AC, 1:20 PRN with snacks/supplements  - Lispro correction 1:50>150 TID AC, >200 HS     At discharge had discussed challenges around glucose lability and dosing: Lantus dose of 18 units may be too high if patient is not eating. However, daughter notes that with more dramatic changes patient will sometimes rise to 400s and has been in DKA before which is hard to balance. If she is concerned about mom going low at night, will try to give her a snack to support Lantus dosing. She is working to resume CGM to more closely monitor blood sugars.  Missing doses?: unknown, daughter not present to confirm   Historical Diabetes Medications: various insulins and dosing      Glucose monitoring device/frequency/trends: Historically has used glucometer before meals and at bedtime. Has tried and failed multiple CGMs (issues with accuracy or patient picking CGM off).   Current trends unknown as daughter not present to discuss- historically known to be labile with needs dropping dramatically when NPO    Hypoglycemia PTA:   - Frequency: currently unknown, but historically labile.   - Severity: + history of severe unconscious lows   - Awareness: variable, not intact    - Treatment: candy, juice       Outpatient Diabetes  Provider: Current PCP: Dr Kellogg ( LOV 4/24/25) ; seen by Dr Bony Castaneda in the past (LOV 10/7/24)   Endo referral placed by PCP and IDS last admission, Scheduled with Mai Figueroa PAC 8/4/25   Formal Diabetes Education/Educator: none recently          Physical Exam:   /76 (BP Location: Left arm)   Pulse 88   Temp 97.5  F (36.4  C) (Axillary)   Resp 16   Wt 60 kg (132 lb 4.4 oz)   SpO2 95%   BMI 23.44 kg/m    Constitutional: tired-appearing patient in no acute distress.  Eyes: sleeping  Respiratory: No signs of labored breathing.         Data:     Lab Results   Component Value Date    A1C 8.8 (H) 03/23/2025    A1C 9.6 (H) 01/16/2025    A1C 11.2 (H) 09/23/2024    A1C 10.2 (H) 05/20/2024    A1C 10.7 (H) 01/29/2024    A1C 7.8 (H) 06/21/2021    A1C 11.7 (H) 09/27/2020     ROUTINE IP LABS (Last four results)  BMP  Recent Labs   Lab 05/26/25  0754 05/26/25  0415 05/25/25  2338 05/25/25  2022 05/25/25  0806 05/25/25  0612 05/23/25  1206 05/23/25  0553 05/21/25  0922 05/21/25  0551   NA  --   --   --   --   --  140  --  140  --  139   POTASSIUM  --   --   --   --   --  4.4  --  4.4  --  4.6   CHLORIDE  --   --   --   --   --  106  --  106  --  104   VERONICA  --   --   --   --   --  8.7*  --  8.6*  --  8.5*   CO2  --   --   --   --   --  25  --  26  --  25   BUN  --   --   --   --   --  28.8*  --  31.0*  --  30.4*   CR  --   --   --   --   --  1.33*  --  1.43*  --  1.45*   * 253* 289* 230*   < > 176*   < > 124*   < > 210*    < > = values in this interval not displayed.     CBC  Recent Labs   Lab 05/25/25  0612 05/23/25  0553 05/21/25  0551   WBC 7.8 6.6 5.3   RBC 3.64* 3.58* 3.52*   HGB 10.1* 9.6* 9.6*   HCT 32.4* 31.2* 30.7*   MCV 89 87 87   MCH 27.7 26.8 27.3   MCHC 31.2* 30.8* 31.3*   RDW 15.9* 16.0* 15.8*    227 214     Inpatient Diabetes Service will continue to follow, please don't hesitate to contact the team with any questions or concerns.     Mirela Anand PA-C  Inpatient Diabetes  Service  Available on [employer] or Secure Chat     Please notify inpatient diabetes service if changes are planned to steroids, nutrition, or if procedures are planned requiring prolonged NPO status.Diabetes Management Team job code: [phone]    To contact Inpatient Diabetes Service:     7 AM - 5 PM: Page the IDS BULMARO following the patient that day (see filed or incomplete progress notes/consult notes under Endocrinology)    OR if uncertain of provider assignment: page job code [phone]    5 PM - 7 AM: First call after hours is to primary service.    For urgent after-hours questions, page job code for on call fellow: [phone]     I spent a total of 30 minutes on the date of the encounter doing prep/post-work, chart review, history and exam, documentation and further activities per the note including lab review, multidisciplinary communication, counseling the patient and/or coordinating care regarding acute hyper/hypoglycemic management, as well as discharge management and planning/communication.  See note for details.

## 2025-05-27 LAB
ALBUMIN SERPL BCG-MCNC: 3.3 G/DL (ref 3.5–5.2)
ALP SERPL-CCNC: 232 U/L (ref 40–150)
ALT SERPL W P-5'-P-CCNC: 32 U/L (ref 0–50)
ANION GAP SERPL CALCULATED.3IONS-SCNC: 9 MMOL/L (ref 7–15)
AST SERPL W P-5'-P-CCNC: 43 U/L (ref 0–45)
BASOPHILS # BLD AUTO: 0 10E3/UL (ref 0–0.2)
BASOPHILS NFR BLD AUTO: 0 %
BILIRUB SERPL-MCNC: 0.2 MG/DL
BUN SERPL-MCNC: 28.2 MG/DL (ref 8–23)
CALCIUM SERPL-MCNC: 9.2 MG/DL (ref 8.8–10.4)
CHLORIDE SERPL-SCNC: 104 MMOL/L (ref 98–107)
CREAT SERPL-MCNC: 1.45 MG/DL (ref 0.51–0.95)
EGFRCR SERPLBLD CKD-EPI 2021: 40 ML/MIN/1.73M2
EOSINOPHIL # BLD AUTO: 0.2 10E3/UL (ref 0–0.7)
EOSINOPHIL NFR BLD AUTO: 2 %
ERYTHROCYTE [DISTWIDTH] IN BLOOD BY AUTOMATED COUNT: 16.2 % (ref 10–15)
GLUCOSE BLDC GLUCOMTR-MCNC: 100 MG/DL (ref 70–99)
GLUCOSE BLDC GLUCOMTR-MCNC: 185 MG/DL (ref 70–99)
GLUCOSE BLDC GLUCOMTR-MCNC: 187 MG/DL (ref 70–99)
GLUCOSE BLDC GLUCOMTR-MCNC: 277 MG/DL (ref 70–99)
GLUCOSE BLDC GLUCOMTR-MCNC: 98 MG/DL (ref 70–99)
GLUCOSE SERPL-MCNC: 213 MG/DL (ref 70–99)
HCO3 SERPL-SCNC: 26 MMOL/L (ref 22–29)
HCT VFR BLD AUTO: 33.6 % (ref 35–47)
HGB BLD-MCNC: 10.3 G/DL (ref 11.7–15.7)
IMM GRANULOCYTES # BLD: 0 10E3/UL
IMM GRANULOCYTES NFR BLD: 0 %
LYMPHOCYTES # BLD AUTO: 1.5 10E3/UL (ref 0.8–5.3)
LYMPHOCYTES NFR BLD AUTO: 20 %
MCH RBC QN AUTO: 27 PG (ref 26.5–33)
MCHC RBC AUTO-ENTMCNC: 30.7 G/DL (ref 31.5–36.5)
MCV RBC AUTO: 88 FL (ref 78–100)
MONOCYTES # BLD AUTO: 0.5 10E3/UL (ref 0–1.3)
MONOCYTES NFR BLD AUTO: 6 %
NEUTROPHILS # BLD AUTO: 5.2 10E3/UL (ref 1.6–8.3)
NEUTROPHILS NFR BLD AUTO: 71 %
NRBC # BLD AUTO: 0 10E3/UL
NRBC BLD AUTO-RTO: 0 /100
PLATELET # BLD AUTO: 249 10E3/UL (ref 150–450)
POTASSIUM SERPL-SCNC: 4.6 MMOL/L (ref 3.4–5.3)
PROT SERPL-MCNC: 6.1 G/DL (ref 6.4–8.3)
RBC # BLD AUTO: 3.81 10E6/UL (ref 3.8–5.2)
SODIUM SERPL-SCNC: 139 MMOL/L (ref 135–145)
WBC # BLD AUTO: 7.4 10E3/UL (ref 4–11)

## 2025-05-27 PROCEDURE — 99233 SBSQ HOSP IP/OBS HIGH 50: CPT | Performed by: PHYSICIAN ASSISTANT

## 2025-05-27 PROCEDURE — 250N000013 HC RX MED GY IP 250 OP 250 PS 637

## 2025-05-27 PROCEDURE — 99232 SBSQ HOSP IP/OBS MODERATE 35: CPT | Mod: 24 | Performed by: NURSE PRACTITIONER

## 2025-05-27 PROCEDURE — 250N000011 HC RX IP 250 OP 636

## 2025-05-27 PROCEDURE — 250N000013 HC RX MED GY IP 250 OP 250 PS 637: Performed by: STUDENT IN AN ORGANIZED HEALTH CARE EDUCATION/TRAINING PROGRAM

## 2025-05-27 PROCEDURE — 250N000009 HC RX 250: Performed by: STUDENT IN AN ORGANIZED HEALTH CARE EDUCATION/TRAINING PROGRAM

## 2025-05-27 PROCEDURE — G0463 HOSPITAL OUTPT CLINIC VISIT: HCPCS

## 2025-05-27 PROCEDURE — 85018 HEMOGLOBIN: CPT

## 2025-05-27 PROCEDURE — 36415 COLL VENOUS BLD VENIPUNCTURE: CPT

## 2025-05-27 PROCEDURE — 85004 AUTOMATED DIFF WBC COUNT: CPT

## 2025-05-27 PROCEDURE — 120N000002 HC R&B MED SURG/OB UMMC

## 2025-05-27 PROCEDURE — 80053 COMPREHEN METABOLIC PANEL: CPT

## 2025-05-27 RX ADMIN — LEVETIRACETAM 250 MG: 100 SOLUTION ORAL at 21:08

## 2025-05-27 RX ADMIN — CARVEDILOL 6.25 MG: 3.12 TABLET, FILM COATED ORAL at 08:47

## 2025-05-27 RX ADMIN — INSULIN ASPART 1 UNITS: 100 INJECTION, SOLUTION INTRAVENOUS; SUBCUTANEOUS at 03:43

## 2025-05-27 RX ADMIN — INSULIN ASPART 1 UNITS: 100 INJECTION, SOLUTION INTRAVENOUS; SUBCUTANEOUS at 12:36

## 2025-05-27 RX ADMIN — ATORVASTATIN CALCIUM 40 MG: 40 TABLET, FILM COATED ORAL at 08:47

## 2025-05-27 RX ADMIN — MICONAZOLE NITRATE: 20 CREAM TOPICAL at 21:09

## 2025-05-27 RX ADMIN — Medication 20 MG: at 08:45

## 2025-05-27 RX ADMIN — DICLOFENAC SODIUM 2 G: 10 GEL TOPICAL at 17:14

## 2025-05-27 RX ADMIN — CARVEDILOL 6.25 MG: 3.12 TABLET, FILM COATED ORAL at 17:14

## 2025-05-27 RX ADMIN — Medication 15 ML: at 08:45

## 2025-05-27 RX ADMIN — DICLOFENAC SODIUM 2 G: 10 GEL TOPICAL at 21:09

## 2025-05-27 RX ADMIN — INSULIN ASPART 3 UNITS: 100 INJECTION, SOLUTION INTRAVENOUS; SUBCUTANEOUS at 08:44

## 2025-05-27 RX ADMIN — LEVETIRACETAM 250 MG: 100 SOLUTION ORAL at 08:45

## 2025-05-27 RX ADMIN — CLOPIDOGREL BISULFATE 75 MG: 75 TABLET, FILM COATED ORAL at 08:46

## 2025-05-27 RX ADMIN — Medication 1000 MCG: at 08:46

## 2025-05-27 RX ADMIN — ASPIRIN 81 MG CHEWABLE TABLET 324 MG: 81 TABLET CHEWABLE at 08:45

## 2025-05-27 RX ADMIN — HEPARIN SODIUM 5000 UNITS: 5000 INJECTION, SOLUTION INTRAVENOUS; SUBCUTANEOUS at 13:53

## 2025-05-27 RX ADMIN — LEVOTHYROXINE SODIUM 88 MCG: 88 TABLET ORAL at 08:48

## 2025-05-27 RX ADMIN — DICLOFENAC SODIUM 2 G: 10 GEL TOPICAL at 12:36

## 2025-05-27 RX ADMIN — Medication 1 MG: at 21:08

## 2025-05-27 RX ADMIN — HEPARIN SODIUM 5000 UNITS: 5000 INJECTION, SOLUTION INTRAVENOUS; SUBCUTANEOUS at 05:39

## 2025-05-27 RX ADMIN — MICONAZOLE NITRATE: 20 CREAM TOPICAL at 08:45

## 2025-05-27 RX ADMIN — HEPARIN SODIUM 5000 UNITS: 5000 INJECTION, SOLUTION INTRAVENOUS; SUBCUTANEOUS at 21:10

## 2025-05-27 RX ADMIN — GABAPENTIN 100 MG: 100 CAPSULE ORAL at 21:08

## 2025-05-27 RX ADMIN — DICLOFENAC SODIUM 2 G: 10 GEL TOPICAL at 08:45

## 2025-05-27 ASSESSMENT — ACTIVITIES OF DAILY LIVING (ADL)
ADLS_ACUITY_SCORE: 89
ADLS_ACUITY_SCORE: 90
ADLS_ACUITY_SCORE: 98
ADLS_ACUITY_SCORE: 85
ADLS_ACUITY_SCORE: 94
ADLS_ACUITY_SCORE: 90
ADLS_ACUITY_SCORE: 89
ADLS_ACUITY_SCORE: 90
ADLS_ACUITY_SCORE: 89
ADLS_ACUITY_SCORE: 89
ADLS_ACUITY_SCORE: 90
ADLS_ACUITY_SCORE: 90
ADLS_ACUITY_SCORE: 98
ADLS_ACUITY_SCORE: 90
ADLS_ACUITY_SCORE: 90
ADLS_ACUITY_SCORE: 98
ADLS_ACUITY_SCORE: 90
ADLS_ACUITY_SCORE: 85
ADLS_ACUITY_SCORE: 98
ADLS_ACUITY_SCORE: 90

## 2025-05-27 NOTE — PROGRESS NOTES
[facility]    Medicine Progress Note - Hospitalist Service, GOLD TEAM 6    Date of Admission:  5/7/2025    Assessment & Plan   Isabell Matthews is a 64 year old female admitted on 5/7/2025. She has a past medical history significant for HTN, T1DM c/b diabetic neuropathy, HLD, orthostatic hypotension, seizures, vascular dementia, prior CVAs, hypothyroidism, recurrent UTIs, and frequent falls. She initially presented to the ED for evaluation of stroke-like symptoms. MRI completed showing acute infarcts for which she was not a candidate for advanced therapies.     Multifocal acute cerebral infarcts  Hx of multiple CVAs  Hx of seizures  Presented w/ new findings of dysarthria, dysphagia, decreased responsiveness, confusion. CT head negative for acute infarct or hemorrhage. CTA w/ multifocal stenosis involving L RADHA and R MCA which had progressed since 1/16/25. Stroke Neuro team consulted while in the ED. Loaded with keppra. EEG negative. Found to have UTI so concern presentation was recrudescence of prior CVA deficits however MRI brain obtained showing acute infarcts in R corona radiata, precentral gyrus and operculum. Not a candidate for advanced therapies. Palliative care consulted this admission given poor neuro recovery with family considering hospice placement.   - Keppra 250mg BID   - Atorvastatin 40mg daily  - DAPT with ASA + Plavix x 90 days from 5/15 (then aspirin lifeliong)  - Palliative Care consulted, greatly appreciate assistance   - Stroke Neuro follow-up in 8 weeks recommended if not discharged on hospice  - SW following     Acute metabolic encephalopathy, multifactorial  Hx of vascular dementia  Acute change in status noted on arrival, possibly 2/2 acute CVA vs metabolic encephalopathy. Likely multifactorial with UTI, dehydration, hyperglycemia, hypercapnia, and hospital environment contributing, as well as newfound acute CVA as above. No significant electrolyte  abnormalities. No suspicious meds. TSH 0.14. Some clinical improvement noted. Patient no longer somnolent or encephalopathic, although her cognitive function does not appear intact. Family reports patient is close to baseline.   - Bedside sitter discontinued since 5/15  - Monitor clinically  - Delirium precautions  - Continue melatonin 1mg at bedtime      Dysphagia (S/P NGT placement 5/10)  Patient failed initial swallow study. Likely in the setting of acute encephalopathy and acute stroke. SLP consulted, recommended strict NPO. NG placed 5/10, and has now begun TFs without issues. No desire to pursue PEG per goals of care discussion. NG tube feeds more of a temporary measure to see if swallowing will improve. SLP re-evaluated 5/22 at bedside -- patient retaining food/liquids in mouth for ~10 minutes despite multiple cues to swallow. When patient eventually swallows there is no dysphagia or aspiration. She is at increased risk for aspiration due to oral retention, otherwise swallow function appears to be intact. She would not be a candidate for further SLP interventions or exercises.  - Continue pureed diet with thin liquids and 1:1 supervision  - Calorie counts   - Continue TF at goal rate of 45mL/hr for now  - Will discuss coming off TF with family in coming days, will not be continued on discharge if pursuing hospice      Possible stress cardiomyopathy  Echo this admission with LVEF 50-55% with mid ventricular hypokinesis w/ preserved apical and mid segements c/w stress CM. No obvious signs of heart failure. Clinically, she has no s/sx to suggest acutely decompensated HF.  - Continue carvedilol w/ hold parameters     DM1  Hypoglycemia  A1c of 8.8% in 3/2025. Had been NPO due to to dysphagia and now on TF. Labile blood sugars and hypoglycemia noted in setting of infection. PTA regimen includes lantus 18 units at bedtime.   - Endocrine following, appreciate assistance  - Lantus 8 units daily at 1200  - NPH 16 units  qAM + 8 units q PM   - Novolog carb coverage with 1 unit per 20 g CHO  - Medium intensity sliding scale q4hh while on TF  - BG checks q4h, hypoglycemia protocol   - PRN D10 at 50 mL/hr if tube feeds are stopped or interrupted                 Hypothyroidism  Most recent TSH 0.99. Repeat TSH here 0.14. On admission, daughter noted recent medication adjustments (brand synthroid vs generic) therefore may have been over-replaced. Cannot r/o sick euthyroid state as well.  - Continue PTA PO synthroid 88mcg      CKD stage IIIb  Baseline Cr of 1.4-1.6. Cr improved to 1.3-1.4 with IVF. Suspect chronic dehydration contributing.  - Check BMP intermittently while inpatient      HTN  HLD  -180 on admission. Can allow for permissive hypertension in setting of acute stroke per Stroke Neuro.  - Continue PTA carvedilol 6.25 mg BID w/ hold parameters  - Atorvastatin, ASA, plavix as above   - IV hydralazine PRN for SBP>180     Chronic low back pain  - Diclofenac gel PRN  - Continue PTA duloxetine 20 mg daily, gabapentin 100 mg at bedtime      Seasonal allergies  - Hold PTA loratidine for now to reduce pill burden via NGT     Closed Colles' Fracture of L Wrist  Occurred in April, saw Ortho 4/22/25, but not deemed a surgical candidate d/t her medical comorbidities. Per Ortho, they wanted her in brace and to be NWB status at L wrist. Repeat XR redemonstrated known comminuted intra-articular fracture of distal left radius. Ortho consulted with recommendations for hand therapy and progressing ROM as tolerate out of brace.   - Coffee cup weight bearing to L wrist.  - Hand therapy if able, ROM as tolerated out of brace     Left wrist rash  Initially felt to be pressure injury from wrist brace. Concern it developed vesicular appearance however VZV negative.   - WOCN consulted     Hypernatremia, resolved  Free water deficit in setting of dysphagia and NPO status. Resolved with hypotonic fluids.      UTI, recurrent, resolved  Urine  culture from 5/1/25 grew pan-susceptible E.Coli. Afebrile on arrival, WBC normal. No other obvious source of infection. Repeat UA/UC negative. Completed 7 days of IV Ceftriaxone on 5/12.             Diet: Adult Formula Drip Feeding: Continuous Sanrad Standard 1.4; Nasogastric tube; Goal Rate: 45 ( can start at goal ); mL/hr; Initiate at 10ml/hr and advance by 10ml Q8H as tolerated.; Do not advance tube feeding rate unless K+ is = or > 3.0, Mg++...  Pureed Diet (level 4) Thin Liquids (level 0)  Snacks/Supplements Adult: Other; Please send Glucerna with every bkf, lunch and dinner trays; With Meals  Snacks/Supplements Adult: Magic Cup; Between Meals    DVT Prophylaxis: Heparin SQ  Parker Catheter: Not present  Lines: None     Cardiac Monitoring: None  Code Status: No CPR- Do NOT Intubate      Clinically Significant Risk Factors               # Hypoalbuminemia: Lowest albumin = 2.9 g/dL at 5/17/2025  6:01 AM, will monitor as appropriate     # Hypertension: Noted on problem list     # Dementia: noted on problem list         # Severe Malnutrition: based on nutrition assessment and treatment provided per dietitian's recommendations.    # Financial/Environmental Concerns:           Social Drivers of Health    Food Insecurity: Unknown (5/17/2025)    Food Insecurity     Within the past 12 months, did you worry that your food would run out before you got money to buy more?: Patient unable to answer     Within the past 12 months, did the food you bought just not last and you didn t have money to get more?: Patient unable to answer   Depression: At risk (4/2/2025)    PHQ-2     PHQ-2 Score: 4   Housing Stability: Unknown (5/17/2025)    Housing Stability     Do you have housing? : Patient unable to answer     Are you worried about losing your housing?: Patient unable to answer   Tobacco Use: Medium Risk (4/24/2025)    Patient History     Smoking Tobacco Use: Former     Smokeless Tobacco Use: Never   Financial Resource Strain:  Unknown (5/17/2025)    Financial Resource Strain     Within the past 12 months, have you or your family members you live with been unable to get utilities (heat, electricity) when it was really needed?: Patient unable to answer   Transportation Needs: Unknown (5/17/2025)    Transportation Needs     Within the past 12 months, has lack of transportation kept you from medical appointments, getting your medicines, non-medical meetings or appointments, work, or from getting things that you need?: Patient unable to answer   Interpersonal Safety: Unknown (5/17/2025)    Interpersonal Safety     Do you feel physically and emotionally safe where you currently live?: Patient unable to answer     Within the past 12 months, have you been hit, slapped, kicked or otherwise physically hurt by someone?: Patient unable to answer     Within the past 12 months, have you been humiliated or emotionally abused in other ways by your partner or ex-partner?: Patient unable to answer          Disposition Plan     Medically Ready for Discharge: Ready Now           The patient's care was discussed with the Attending Physician, Dr. Mcdermott, Bedside Nurse, Care Coordinator/, and Patient.    Sabina Moore PA-C  Hospitalist Service, GOLD TEAM [address] [place] [address]  Securely message with doForms (more info)  Text page via [facility] Paging/Directory   See signed in provider for up to date coverage information  ______________________________________________________________________    Interval History   No acute events overnight. Awake in bed on my arrival. Nods head yes no to questions. Denies pain. No problems breathing. Asked if she had anything bothering her and shows me her left wrist (has known prior closed fracture). Otherwise no concerns.     Physical Exam   Vital Signs: Temp: 98.7  F (37.1  C) Temp src: Oral BP: (!) 172/72 Pulse: 76   Resp: 19 SpO2: 96 % O2 Device: None (Room air)    Weight: 132  lbs 4.42 oz    General Appearance:  Awake. Alert. Answers yes/no. No acute distress.   Respiratory: Normal work of breathing on room air. Lungs CTAB.   Cardiovascular: RRR. S1, S2. No murmurs.   GI: Abdomen non-distended. Normoactive. Soft, nontender. No guarding.   Skin: Warm, dry.     Medical Decision Making       55 MINUTES SPENT BY ME on the date of service doing chart review, history, exam, documentation & further activities per the note.      Data     I have personally reviewed the following data over the past 24 hrs:    7.4  \   10.3 (L)   / 249     139 104 28.2 (H) /  277 (H)   4.6 26 1.45 (H) \     ALT: 32 AST: 43 AP: 232 (H) TBILI: 0.2   ALB: 3.3 (L) TOT PROTEIN: 6.1 (L) LIPASE: N/A       Imaging results reviewed over the past 24 hrs:   No results found for this or any previous visit (from the past 24 hours).

## 2025-05-27 NOTE — PLAN OF CARE
Goal Outcome Evaluation:    Shift Hours: 6420-3247    Assessment:  Body systems that were not at patient's baseline Gastrointestinal and Safety. Focused body system assessments documented in flowsheets.        Activity     Fall Risk Score: 110   Bed alarm on? Yes     Activity Assistance Provided: assistance, 2 people      Assistive Device Utilized: walker    Pain: None per pt/visual    Labs/RN Managed Protocols:BG Q4    Lines/Drains: NG, 2 PIV SL    Nutrition: NG-TF, Pureed/1:1 feeder    Goal Outcome Evaluation    Plan of Care Reviewed With: patient  Overall Patient Progress: improving  Outcome Evaluation: Patient rested well overnight. BG monitored Q4 and covered per SS. VSS on RA. NG TF @ goal. Incontinent bowel/bladder, turned Q2, pt follows commands and turns self in-between turns often. Bed alarm on for safety. No s/s of pain, monitored. CPOC.    Barriers to Discharge:     Care conference 5/27  Nutrition POC

## 2025-05-27 NOTE — CONSULTS
[facility]  WOC Nurse Inpatient Assessment     Consulted for: L wrist    WOC nurse follow-up plan: signing off    Patient History (according to provider note(s):      Isabell Matthews is a 64 year old female with history of T1DM with diabetic neuropathy, CKD3b, HTN, HLD, orthostatic hypotension, seizures, vascular dementia, hypothyroidism, recurrent UTI's, and frequent falls who was admitted to [facility] 5/7/2025 with concern for stroke after presenting with acute neurologic symptoms. Hospital course has been complicated by labile BG and dysphagia, she continues to be NPO and continues to be on continuous TF.     Assessment:      Areas visualized during today's visit: Left wrist    Wound location: Left wrist    Left wrist anterior    Left wrist posterior   Last photo: 5/27  Wound due to: Unknown Etiology  Wound history/plan of care: Not a pressure injury. Most likely trauma.  Wound base: anterior 100 % Fibrin, posterior 100 % Blanchable  bruising      Palpation of the wound bed: normal      Drainage: none     Description of drainage: none     Measurements (length x width x depth, in cm): anterior 0.2  x 0.2  x  0.1 cm      Tunneling: N/A     Undermining: N/A  Periwound skin: Intact      Color: normal and consistent with surrounding tissue      Temperature: normal   Odor: none  Pain: denies , none  Pain interventions prior to dressing change: N/A  Treatment goal: Per patient preference  STATUS: initial assessment  Supplies ordered: supplies stored on unit      Treatment Plan:     Left wrist wound(s): Leave open to air     Orders: Written    RECOMMEND PRIMARY TEAM ORDER: None, at this time  Education provided: None needed  Discussed plan of care with: Nurse  Notify WOC if wound(s) deteriorate.  Nursing to notify the Provider(s) and re-consult the WOC Nurse if new skin concern.    DATA:     Current support surface: Standard  Standard gel mattress (Isoflex)  Containment of  urine/stool: Incontinence Protocol  BMI: Body mass index is 23.52 kg/m .   Active diet order: Orders Placed This Encounter      Pureed Diet (level 4) Thin Liquids (level 0)     Output: I/O last 3 completed shifts:  In: 1082.5 [NG/GT:385]  Out: -      Labs:   Recent Labs   Lab 05/27/25  0516   ALBUMIN 3.3*   HGB 10.3*   WBC 7.4     Pressure injury risk assessment:   Sensory Perception: 3-->slightly limited  Moisture: 3-->occasionally moist  Activity: 2-->chairfast  Mobility: 2-->very limited  Nutrition: 3-->adequate  Friction and Shear: 2-->potential problem  Dayron Score: 15    Ashley Christianson RN CWOCN  Please contact through [facility] group: [facility] Nurse [place]  Dept. Office Number: [phone]

## 2025-05-27 NOTE — PLAN OF CARE
Shift Hours: 0700 - 1900     Assessment:  Body systems that were not at patient's baseline Cognitive/Behavioral/Neuro, Gastrointestinal, Genitourinary, Skin, and Musculoskeletal. Focused body system assessments documented in flowsheets.        Activity              Fall Risk Score: 110              Bed alarm on? Yes     Activity Assistance Provided: assistance, 2 people      Assistive Device Utilized: walker, gait belt    Pain: denies pain     Labs/RN Managed Protocols: no RN managed labs, BG q4h elevated but stable w/ current regimen.     Lines/Drains: PIV x2 SL, NGT w/ TF at goal rate    Nutrition: TF/pureed diet, no PO intake, refused all meals this shift.      Goal Outcome Evaluation:      Plan of Care Reviewed With: patient    Overall Patient Progress: no changeOverall Patient Progress: no change    Outcome Evaluation: No acute events this shift. Neuro's remain unchanged. Patient nodded head yes/no but no verbal communication. Follows commands. Wound cares completed this morning, due again this evening. Q1h rounding completed, q2h turning, call light w/ in reach.    Barriers to Discharge:   Ongoing POC

## 2025-05-27 NOTE — PROGRESS NOTES
Inpatient Diabetes Management Service: Daily Progress Note     HPI: Isabell Matthews is a 64 year old female with history of  T1DM with diabetic neuropathy, CKD3b, HTN, HLD, orthostatic hypotension, seizures, vascular dementia, hypothyroidism, recurrent UTI's, and frequent falls who was admitted to [facility] 5/7/2025 with concern for stroke after presenting with acute neurologic symptoms. Hospital course has been complicated by labile BG and dysphagia, she continues to be NPO and continues to be on continuous TF.      IDS consulted to assist with glycemic management as well as optimization while inpatient and when applicable, recommendations for transition home.            Assessment/Plan:     Assessment:  Type 1 Diabetes Mellitus c/b peripheral neuropathy, nephropathy, Sub-optimal control. (A1c 8.8 % 3/23/25) in presence of complex illness and anemia.   Vascular dementia with acute cerebral infarcts new findings of dysarthria, dysphagia, decreased responsiveness, confusion   Labile BG 2/2 Tube feedings  BMI: 22     Plan/Recommendations:     ** patient has T1DM - requires insulin, basal dose must not to be withheld entirely OR for prolonged periods, high risk for DKA. If trending >250 mg/dL longer than 4 hours, please draw appropriate labs to determine if DKA and treat accordingly **      - Increase  NPH 15--->16 unit(s) at 0900 with tube feeding ([employer] ([employer] 1.4 at 45 ml/hr). Hold if TF held.  - Continue NPH 7  unit(s) at 2100 with tube feeding ([employer] ([employer] 1.4 at 45 ml/hr). Hold if TF held.   - PRN D10 at 75 ml/hr if tube feeds are stopped/interrupted (dosed [employer] Standard 1.4 @ goal 45 mL/hr) dosing provides 75% of CHO that would have been given by TF   - Lantus 8 units q 24 hrs at 1400 [DKA protective dose]  - Novolog Carb Coverage: 1 unit per 20 grams CHO TID with meals and PRN with snacks/supplements  - Novolog Correction Scale: 1:50>140 q4 hours (medium resistance)  - BG checks: q4hr  -  Hypoglycemia protocol    - Carb counting protocol      Per ADA guidelines, treatment goals and recommendations for older adults with DM and medically complexity is less stringent BG control/A1c for safety - targets of 110-180 mg/dL and up to 250 mg/dL reasonable - albeit not persistent for those with T1DM. Avoid reliance on A1c. Glucose decisions should be based on avoidance of hypoglycemia and symptomatic hyperglycemia.         Discussion:   Elevated BG yesterday. Will increase NPH slightly. Cr 1.45 GFR 40. NGT w/ TF at goal rate Refusing all meals today.      Discharge Planning: (tentative) --> likely TCU at discharge   Medications: TBD  - adjustment of PTA dosing with addition of NPH to cover tube feeds as indicated   Needs set doses for day program, would be able to use ICR at home.   Test Claims:  none needed.   Education:  Needs to be assessed closer to discharge.    Outpatient Follow-up: PCP- Dr Kellogg ( LOV 4/24/25), until able to establish with [facility]- has appointment with Mai Figueroa 8/4/25       Please notify Inpatient Diabetes Service if changes are planned to steroids, nutrition, TPN/TF and anticipated procedures requiring prolonged NPO status.         Interval History/Review of Systems :   The last 24 hours progress and nursing notes reviewed.  No events overnight   Patient very lethargic this morning.   Palliative care consulted this admission given poor neuro recovery with family considering hospice placement. Primary team discussing coming off TF with family in coming days, will not be continued on discharge if pursuing hospice     Planned Procedures/Surgeries: none    Inpatient Glucose Control:       Recent Labs   Lab 05/27/25  0516 05/27/25  0342 05/26/25  2336 05/26/25 2011 05/26/25  1606 05/26/25  1209   * 187* 182* 137* 150* 198*             Medications Impacting Glycemia:   Steroids: none  D5W containing solutions/medications: none   Other medications impacting  glucose: none        Nutrition:   Orders Placed This Encounter      Pureed Diet (level 4) Thin Liquids (level 0)    Supplements:  none  TPN: none   TF:   5/13 - 5/17: been at Osmolite 1.5 goal of 40 ml/hr since 5/13, provides 195 g cho in 24hrs  5/17 - current:  continuous [employer] Standard 1.4 @ goal 45 mL/hr provides 170 g cho in 24 hrs         Diabetes History: see full consult note for complete diabetes history   Diabetes Type and Duration: DM for > 40 years, diagnosed in her 40s. Per daughter,  initially told she has T2DM then T1DM for a while then flipped back to being told T2DM.      6/17/2024 positive GAD65 antibody and c-peptide <0.1 but BG elevated     PTA Medication Regimen:   From discharge 3/30/25:- daughter not present to confirm   - Lantus 18 units once daily at HS  - Lispro ICR 1:15 TID AC, 1:20 PRN with snacks/supplements  - Lispro correction 1:50>150 TID AC, >200 HS     At discharge had discussed challenges around glucose lability and dosing: Lantus dose of 18 units may be too high if patient is not eating. However, daughter notes that with more dramatic changes patient will sometimes rise to 400s and has been in DKA before which is hard to balance. If she is concerned about mom going low at night, will try to give her a snack to support Lantus dosing. She is working to resume CGM to more closely monitor blood sugars.  Missing doses?: unknown, daughter not present to confirm   Historical Diabetes Medications: various insulins and dosing      Glucose monitoring device/frequency/trends: Historically has used glucometer before meals and at bedtime. Has tried and failed multiple CGMs (issues with accuracy or patient picking CGM off).   Current trends unknown as daughter not present to discuss- historically known to be labile with needs dropping dramatically when NPO    Hypoglycemia PTA:   - Frequency: currently unknown, but historically labile.   - Severity: + history of severe unconscious lows   -  Awareness: variable, not intact    - Treatment: candy, juice       Outpatient Diabetes Provider: Current PCP: Dr Kellogg ( LOV 4/24/25) ; seen by Dr Bony Castaneda in the past (LOV 10/7/24)   Endo referral placed by PCP and IDS last admission, Scheduled with Mai Figueroa, PAC 8/4/25   Formal Diabetes Education/Educator: none recentl        Physical Exam:   BP (!) 172/72 (BP Location: Right arm, Patient Position: Semi-Cantu's, Cuff Size: Adult Regular)   Pulse 76   Temp 98.7  F (37.1  C) (Oral)   Resp 19   Wt 60 kg (132 lb 4.4 oz)   SpO2 96%   BMI 23.44 kg/m    General:  appears fatigued.   HEENT:  NC/AT.   Lungs:  unremarkable  ABD:  rounded           Data:     Lab Results   Component Value Date    A1C 8.8 (H) 03/23/2025    A1C 9.6 (H) 01/16/2025    A1C 11.2 (H) 09/23/2024    A1C 10.2 (H) 05/20/2024    A1C 10.7 (H) 01/29/2024    A1C 7.8 (H) 06/21/2021    A1C 11.7 (H) 09/27/2020       ROUTINE IP LABS (Last four results)  BMP  Recent Labs   Lab 05/27/25  0516 05/27/25  0342 05/26/25  2336 05/26/25 2011 05/25/25  0806 05/25/25  0612 05/23/25  1206 05/23/25  0553 05/21/25  0922 05/21/25  0551     --   --   --   --  140  --  140  --  139   POTASSIUM 4.6  --   --   --   --  4.4  --  4.4  --  4.6   CHLORIDE 104  --   --   --   --  106  --  106  --  104   VERONICA 9.2  --   --   --   --  8.7*  --  8.6*  --  8.5*   CO2 26  --   --   --   --  25  --  26  --  25   BUN 28.2*  --   --   --   --  28.8*  --  31.0*  --  30.4*   CR 1.45*  --   --   --   --  1.33*  --  1.43*  --  1.45*   * 187* 182* 137*   < > 176*   < > 124*   < > 210*    < > = values in this interval not displayed.     CBC  Recent Labs   Lab 05/27/25  0516 05/25/25  0612 05/23/25  0553 05/21/25  0551   WBC 7.4 7.8 6.6 5.3   RBC 3.81 3.64* 3.58* 3.52*   HGB 10.3* 10.1* 9.6* 9.6*   HCT 33.6* 32.4* 31.2* 30.7*   MCV 88 89 87 87   MCH 27.0 27.7 26.8 27.3   MCHC 30.7* 31.2* 30.8* 31.3*   RDW 16.2* 15.9* 16.0* 15.8*    236 227 214     INRNo lab  results found in last 7 days.    Inpatient Diabetes Service will continue to follow, please don't hesitate to contact the team with any questions or concerns.     PEGGY Alejandro CNP    Plan discussed with patient, bedside RN, and primary team via this note.    To contact Inpatient Diabetes Service:     7 AM - 5 PM: Page the IDS BULMARO following the patient that day (see filed or incomplete progress notes/consult notes under Endocrinology)    OR if uncertain of provider assignment: page job code [phone]    5 PM - 7 AM: First call after hours is to primary service.    For urgent after-hours questions, page job code for on call fellow: [phone]     I spent a total of 45 minutes on the date of the encounter doing prep/post-work, chart review, history and exam, documentation and further activities per the note including lab review, multidisciplinary communication, counseling the patient and/or coordinating care regarding acute hyper/hypoglycemic management, as well as discharge management and planning/communication.

## 2025-05-27 NOTE — PLAN OF CARE
Shift Hours: 1500 - 2300    Assessment:  Body systems that were not at patient's baseline Cognitive/Behavioral/Neuro and Gastrointestinal. Focused body system assessments documented in flowsheets.        Activity     Fall Risk Score: 95   Bed alarm on? Yes     Activity Assistance Provided: assistance, 2 people      Assistive Device Utilized: walker    Pain: denies    Labs/RN Managed Protocols: BG checks Q4hr with s/s insulin    Lines/Drains: 2 R PIVs both saline locked.  Purewick in place for urinary incontinence.  NG for enteral feeds.    Nutrition: puree diet with thin liquids.  Pt refuses po intake.  TF running through NG at goal rate of 45mL/hr with FWF.      Goal Outcome Evaluation  Plan of Care Reviewed With: patient  Overall Patient Progress: no change  Outcome Evaluation: Alert.  Able to nod head yes/no and follow commands.  Does not speak much.  VSS on room air.  Reposition Q2hr.  Incontinent of bowel and bladder. Medium soft BM this shift.  Neuro checks Qshift.  No appetite.  Antonio counts.  No acute changes.     Barriers to Discharge:   Placement

## 2025-05-28 ENCOUNTER — APPOINTMENT (OUTPATIENT)
Dept: PHYSICAL THERAPY | Facility: CLINIC | Age: 65
DRG: 064 | End: 2025-05-28
Payer: COMMERCIAL

## 2025-05-28 ENCOUNTER — APPOINTMENT (OUTPATIENT)
Dept: OCCUPATIONAL THERAPY | Facility: CLINIC | Age: 65
DRG: 064 | End: 2025-05-28
Payer: COMMERCIAL

## 2025-05-28 LAB
GLUCOSE BLDC GLUCOMTR-MCNC: 108 MG/DL (ref 70–99)
GLUCOSE BLDC GLUCOMTR-MCNC: 118 MG/DL (ref 70–99)
GLUCOSE BLDC GLUCOMTR-MCNC: 126 MG/DL (ref 70–99)
GLUCOSE BLDC GLUCOMTR-MCNC: 149 MG/DL (ref 70–99)
GLUCOSE BLDC GLUCOMTR-MCNC: 203 MG/DL (ref 70–99)
GLUCOSE BLDC GLUCOMTR-MCNC: 245 MG/DL (ref 70–99)
GLUCOSE BLDC GLUCOMTR-MCNC: 258 MG/DL (ref 70–99)

## 2025-05-28 PROCEDURE — 99232 SBSQ HOSP IP/OBS MODERATE 35: CPT | Performed by: FAMILY MEDICINE

## 2025-05-28 PROCEDURE — 250N000013 HC RX MED GY IP 250 OP 250 PS 637

## 2025-05-28 PROCEDURE — 97110 THERAPEUTIC EXERCISES: CPT | Mod: GO | Performed by: OCCUPATIONAL THERAPIST

## 2025-05-28 PROCEDURE — 250N000013 HC RX MED GY IP 250 OP 250 PS 637: Performed by: STUDENT IN AN ORGANIZED HEALTH CARE EDUCATION/TRAINING PROGRAM

## 2025-05-28 PROCEDURE — 99232 SBSQ HOSP IP/OBS MODERATE 35: CPT | Mod: 24 | Performed by: NURSE PRACTITIONER

## 2025-05-28 PROCEDURE — 97535 SELF CARE MNGMENT TRAINING: CPT | Mod: GO | Performed by: OCCUPATIONAL THERAPIST

## 2025-05-28 PROCEDURE — 99233 SBSQ HOSP IP/OBS HIGH 50: CPT | Mod: FS | Performed by: STUDENT IN AN ORGANIZED HEALTH CARE EDUCATION/TRAINING PROGRAM

## 2025-05-28 PROCEDURE — 250N000011 HC RX IP 250 OP 636

## 2025-05-28 PROCEDURE — 99207 PR APP CREDIT; MD BILLING SHARED VISIT: CPT | Mod: FS | Performed by: PHYSICIAN ASSISTANT

## 2025-05-28 PROCEDURE — 250N000009 HC RX 250: Performed by: STUDENT IN AN ORGANIZED HEALTH CARE EDUCATION/TRAINING PROGRAM

## 2025-05-28 PROCEDURE — 120N000002 HC R&B MED SURG/OB UMMC

## 2025-05-28 PROCEDURE — 97530 THERAPEUTIC ACTIVITIES: CPT | Mod: GP | Performed by: REHABILITATION PRACTITIONER

## 2025-05-28 PROCEDURE — 97116 GAIT TRAINING THERAPY: CPT | Mod: GP | Performed by: REHABILITATION PRACTITIONER

## 2025-05-28 PROCEDURE — 99418 PROLNG IP/OBS E/M EA 15 MIN: CPT | Mod: FS | Performed by: STUDENT IN AN ORGANIZED HEALTH CARE EDUCATION/TRAINING PROGRAM

## 2025-05-28 RX ADMIN — HEPARIN SODIUM 5000 UNITS: 5000 INJECTION, SOLUTION INTRAVENOUS; SUBCUTANEOUS at 21:31

## 2025-05-28 RX ADMIN — ATORVASTATIN CALCIUM 40 MG: 40 TABLET, FILM COATED ORAL at 07:50

## 2025-05-28 RX ADMIN — MICONAZOLE NITRATE: 20 CREAM TOPICAL at 20:21

## 2025-05-28 RX ADMIN — HEPARIN SODIUM 5000 UNITS: 5000 INJECTION, SOLUTION INTRAVENOUS; SUBCUTANEOUS at 12:40

## 2025-05-28 RX ADMIN — CARVEDILOL 6.25 MG: 3.12 TABLET, FILM COATED ORAL at 16:44

## 2025-05-28 RX ADMIN — CLOPIDOGREL BISULFATE 75 MG: 75 TABLET, FILM COATED ORAL at 07:50

## 2025-05-28 RX ADMIN — Medication 1 MG: at 21:31

## 2025-05-28 RX ADMIN — INSULIN ASPART 1 UNITS: 100 INJECTION, SOLUTION INTRAVENOUS; SUBCUTANEOUS at 12:40

## 2025-05-28 RX ADMIN — CARVEDILOL 6.25 MG: 3.12 TABLET, FILM COATED ORAL at 07:51

## 2025-05-28 RX ADMIN — ASPIRIN 81 MG CHEWABLE TABLET 324 MG: 81 TABLET CHEWABLE at 07:50

## 2025-05-28 RX ADMIN — GABAPENTIN 100 MG: 100 CAPSULE ORAL at 21:31

## 2025-05-28 RX ADMIN — INSULIN ASPART 2 UNITS: 100 INJECTION, SOLUTION INTRAVENOUS; SUBCUTANEOUS at 07:56

## 2025-05-28 RX ADMIN — DICLOFENAC SODIUM 2 G: 10 GEL TOPICAL at 07:49

## 2025-05-28 RX ADMIN — INSULIN ASPART 3 UNITS: 100 INJECTION, SOLUTION INTRAVENOUS; SUBCUTANEOUS at 23:49

## 2025-05-28 RX ADMIN — LEVOTHYROXINE SODIUM 88 MCG: 88 TABLET ORAL at 07:50

## 2025-05-28 RX ADMIN — INSULIN ASPART 3 UNITS: 100 INJECTION, SOLUTION INTRAVENOUS; SUBCUTANEOUS at 20:21

## 2025-05-28 RX ADMIN — MICONAZOLE NITRATE: 20 CREAM TOPICAL at 07:50

## 2025-05-28 RX ADMIN — Medication 1000 MCG: at 07:50

## 2025-05-28 RX ADMIN — Medication 20 MG: at 07:50

## 2025-05-28 RX ADMIN — LEVETIRACETAM 250 MG: 100 SOLUTION ORAL at 07:50

## 2025-05-28 RX ADMIN — LEVETIRACETAM 250 MG: 100 SOLUTION ORAL at 21:35

## 2025-05-28 RX ADMIN — Medication 15 ML: at 07:50

## 2025-05-28 ASSESSMENT — ACTIVITIES OF DAILY LIVING (ADL)
ADLS_ACUITY_SCORE: 88
ADLS_ACUITY_SCORE: 88
ADLS_ACUITY_SCORE: 92
ADLS_ACUITY_SCORE: 88
ADLS_ACUITY_SCORE: 92
ADLS_ACUITY_SCORE: 88
ADLS_ACUITY_SCORE: 92
ADLS_ACUITY_SCORE: 88
ADLS_ACUITY_SCORE: 92
ADLS_ACUITY_SCORE: 94
ADLS_ACUITY_SCORE: 88
ADLS_ACUITY_SCORE: 92
ADLS_ACUITY_SCORE: 88
ADLS_ACUITY_SCORE: 88

## 2025-05-28 NOTE — PROGRESS NOTES
Inpatient Diabetes Management Service: Daily Progress Note     HPI: Isabell Matthews is a 64 year old female with history of  T1DM with diabetic neuropathy, CKD3b, HTN, HLD, orthostatic hypotension, seizures, vascular dementia, hypothyroidism, recurrent UTI's, and frequent falls who was admitted to [facility] 5/7/2025 with concern for stroke after presenting with acute neurologic symptoms. Hospital course has been complicated by labile BG and dysphagia, she continues to be NPO and continues to be on continuous TF.      IDS consulted to assist with glycemic management as well as optimization while inpatient and when applicable, recommendations for transition home.         Assessment/Plan:     Assessment:  Type 1 Diabetes Mellitus c/b peripheral neuropathy, nephropathy, Sub-optimal control. (A1c 8.8 % 3/23/25) in presence of complex illness and anemia.   Vascular dementia with acute cerebral infarcts new findings of dysarthria, dysphagia, decreased responsiveness, confusion   Labile BG 2/2 Tube feedings  BMI: 22     Plan/Recommendations:     ** patient has T1DM - requires insulin, basal dose must not to be withheld entirely OR for prolonged periods, high risk for DKA. If trending >250 mg/dL longer than 4 hours, please draw appropriate labs to determine if DKA and treat accordingly **      - Decrease  NPH 13 unit(s) at 0900 with tube feeding ([employer] 1.4 at 45 ml/hr). Hold if TF held.  - Continue NPH 7  unit(s) at 2100 with tube feeding ([employer] 1.4 at 45 ml/hr). Hold if TF held.   - PRN D10 at 75 ml/hr if tube feeds are stopped/interrupted (dosed [employer] Standard 1.4 @ goal 45 mL/hr) dosing provides 75% of CHO that would have been given by TF   - Lantus 8 units q 24 hrs at 1400 [DKA protective dose]  - Novolog Carb Coverage: 1 unit per 20 grams CHO TID with meals and PRN with snacks/supplements  - Novolog Correction Scale: 1:50>140 q4 hours (medium resistance)  - BG checks: q4hr  - Hypoglycemia  protocol    - Carb counting protocol      Per ADA guidelines, treatment goals and recommendations for older adults with DM and medically complexity is less stringent BG control/A1c for safety - targets of 110-180 mg/dL and up to 250 mg/dL reasonable - albeit not persistent for those with T1DM. Avoid reliance on A1c. Glucose decisions should be based on avoidance of hypoglycemia and symptomatic hyperglycemia.         Discussion:   Tolerating TF at goal rate. Decreased needs during the day yesterday and overnight. Will reduce am NPH as a precaution.Continues to have no oral intake. Labs:       Plan for repeat care conference on 5/29     Discharge Planning: (tentative) --> likely TCU at discharge   Medications: TBD  - adjustment of PTA dosing with addition of NPH to cover tube feeds as indicated   Needs set doses for day program, would be able to use ICR at home.   Test Claims:  none needed.   Education:  Needs to be assessed closer to discharge.    Outpatient Follow-up: PCP- Dr Kellogg ( LOV 4/24/25), until able to establish with [facility]- has appointment with Mai Figueroa 8/4/25    Please notify Inpatient Diabetes Service if changes are planned to steroids, nutrition, TPN/TF and anticipated procedures requiring prolonged NPO status.         Interval History/Review of Systems :   The last 24 hours progress and nursing notes reviewed.  Refusing all meals yesterday.   TF through NG tube @ goal rate   Per primary team: Will discuss coming off TF with family in coming days, will not be continued on discharge if pursuing hospice    Plan for repeat care conference on 5/29  - PT/OT/SLP re-evaluating today to give better update regarding rehab options/potential     Planned Procedures/Surgeries: none    Inpatient Glucose Control:       Recent Labs   Lab 05/28/25  0748 05/28/25  0433 05/27/25  2358 05/27/25  2024 05/27/25  1634 05/27/25  1224   * 126* 118* 98 100* 185*             Medications Impacting  Glycemia:   Steroids: none  D5W containing solutions/medications: none   Other medications impacting glucose: none        Nutrition:   Orders Placed This Encounter      Pureed Diet (level 4) Thin Liquids (level 0)    TPN: none   TF:   5/13 - 5/17: been at Osmolite 1.5 goal of 40 ml/hr since 5/13, provides 195 g cho in 24hrs  5/17 - current:  continuous [employer] Standard 1.4 @ goal 45 mL/hr provides 170 g cho in 24 hrs         Diabetes History: see full consult note for complete diabetes history   Diabetes Type and Duration: DM for > 40 years, diagnosed in her 40s. Per daughter,  initially told she has T2DM then T1DM for a while then flipped back to being told T2DM.      6/17/2024 positive GAD65 antibody and c-peptide <0.1 but BG elevated     PTA Medication Regimen:   From discharge 3/30/25:- daughter not present to confirm   - Lantus 18 units once daily at HS  - Lispro ICR 1:15 TID AC, 1:20 PRN with snacks/supplements  - Lispro correction 1:50>150 TID AC, >200 HS     At discharge had discussed challenges around glucose lability and dosing: Lantus dose of 18 units may be too high if patient is not eating. However, daughter notes that with more dramatic changes patient will sometimes rise to 400s and has been in DKA before which is hard to balance. If she is concerned about mom going low at night, will try to give her a snack to support Lantus dosing. She is working to resume CGM to more closely monitor blood sugars.  Missing doses?: unknown, daughter not present to confirm   Historical Diabetes Medications: various insulins and dosing      Glucose monitoring device/frequency/trends: Historically has used glucometer before meals and at bedtime. Has tried and failed multiple CGMs (issues with accuracy or patient picking CGM off).   Current trends unknown as daughter not present to discuss- historically known to be labile with needs dropping dramatically when NPO    Hypoglycemia PTA:   - Frequency: currently unknown,  but historically labile.   - Severity: + history of severe unconscious lows   - Awareness: variable, not intact    - Treatment: candy, juice       Outpatient Diabetes Provider: Current PCP: Dr Kellogg ( LOV 4/24/25) ; seen by Dr Bony Castaneda in the past (LOV 10/7/24)   Endo referral placed by PCP and IDS last admission, Scheduled with Mai Figueroa, PAC 8/4/25   Formal Diabetes Education/Educator: none recentl        Physical Exam:   BP (!) 153/66 (BP Location: Left arm)   Pulse 73   Temp 96.9  F (36.1  C) (Oral)   Resp 18   Wt 59.2 kg (130 lb 8.2 oz)   SpO2 97%   BMI 23.13 kg/m    General:  Sitting up in chair tolerating TF   HEENT:  NC/AT. MMM, sclera not injected  ABD:  rounded, soft,  Skin:  warm and dry             Data:     Lab Results   Component Value Date    A1C 8.8 (H) 03/23/2025    A1C 9.6 (H) 01/16/2025    A1C 11.2 (H) 09/23/2024    A1C 10.2 (H) 05/20/2024    A1C 10.7 (H) 01/29/2024    A1C 7.8 (H) 06/21/2021    A1C 11.7 (H) 09/27/2020       ROUTINE IP LABS (Last four results)  BMP  Recent Labs   Lab 05/28/25  0748 05/28/25  0433 05/27/25  2358 05/27/25  2024 05/27/25  0827 05/27/25  0516 05/25/25  0806 05/25/25  0612 05/23/25  1206 05/23/25  0553   NA  --   --   --   --   --  139  --  140  --  140   POTASSIUM  --   --   --   --   --  4.6  --  4.4  --  4.4   CHLORIDE  --   --   --   --   --  104  --  106  --  106   VERONICA  --   --   --   --   --  9.2  --  8.7*  --  8.6*   CO2  --   --   --   --   --  26  --  25  --  26   BUN  --   --   --   --   --  28.2*  --  28.8*  --  31.0*   CR  --   --   --   --   --  1.45*  --  1.33*  --  1.43*   * 126* 118* 98   < > 213*   < > 176*   < > 124*    < > = values in this interval not displayed.     CBC  Recent Labs   Lab 05/27/25  0516 05/25/25  0612 05/23/25  0553   WBC 7.4 7.8 6.6   RBC 3.81 3.64* 3.58*   HGB 10.3* 10.1* 9.6*   HCT 33.6* 32.4* 31.2*   MCV 88 89 87   MCH 27.0 27.7 26.8   MCHC 30.7* 31.2* 30.8*   RDW 16.2* 15.9* 16.0*    236 227      INRNo lab results found in last 7 days.    Inpatient Diabetes Service will continue to follow, please don't hesitate to contact the team with any questions or concerns.     PEGGY Alejandro CNP    Plan discussed with patient, bedside RN, and primary team via this note.    To contact Inpatient Diabetes Service:     7 AM - 5 PM: Page the IDS BULMARO following the patient that day (see filed or incomplete progress notes/consult notes under Endocrinology)    OR if uncertain of provider assignment: page job code [phone]    5 PM - 7 AM: First call after hours is to primary service.    For urgent after-hours questions, page job code for on call fellow: [phone]     I spent a total of 45 minutes on the date of the encounter doing prep/post-work, chart review, history and exam, documentation and further activities per the note including lab review, multidisciplinary communication, counseling the patient and/or coordinating care regarding acute hyper/hypoglycemic management, as well as discharge management and planning/communication.

## 2025-05-28 NOTE — PROGRESS NOTES
[facility]  Palliative Care Daily Progress Note       Recommendations & Counseling   What's New Today:  I saw Lisa today to reassess her overall condition and evaluate for any changes.  See 5/23 care conference notes.  Family has not yet reached a decision about her discharge plan. Another meeting planned for tmrw.  She was able to nod yes and no to some simple questions questions.  Yet when I tried to get her to respond with head nods to anything with any degree of complexity, she simply closed her eyes and would not nod or respond.  She was able to nod yes and point to her back when asked about pain. She has a known hx chronic back pain. Address pain mgmt with daughters 5/29 CC She may benefit from buprenorphine patch, or very low dose methadone (2 mg once daily)  but hospice pathway might favor prn low dose opioids. I've put in Pharm Liaison request to assess.  Primary team/ have arranged to have another conference with kym Robertson tomorrow 5/29; timing is uncertain; our team will participate as able.    GOALS OF CARE:   Plan of  care -  restorative/life-sustaining with limits (DNR/DNI) - no permanent feeding tube  See 5/23 CC note    ADVANCE CARE PLANNING:  No health care directive on file. Per system policy, Surrogate Decision-makers for Patients With Diminished Decision-making Capacity offers guidance on possible decision-makers. Kirsty Ramey have been identified as a surrogate decision makers.   There is no POLST form on file, defer to patient and/or next of kin for decisions   Code status: No CPR- Do NOT Intubate     MEDICAL MANAGEMENT:   We are not actively managing symptoms at this time.      PSYCHOSOCIAL/SPIRITUAL SUPPORT:  Family - kym Rodgers and Felton (former is local, latter is in [place])  Some concern for caregiver burnout on Vishal's part, uncertain why she directed providers to speak with  Felton warner  [facility]    Luiz Burris MD  Palliative Medicine Consult Team  Text me via [email or web address]  TT: 24 minutes, with > 50% spent in C/C/E patient/family/care teams re: GOC, POC, Sx management.         Assessments          Isabell Matthews is a 64 year old female with a past medical history of HTN, HLD, CKD IIIb, type 1 DM c/b by neuropathy, hx of ischemic and hemorrhagic stroke (ischemic R basal ganglia in 2018, hemorrhagic L basal ganglia in 2021, R centrum semiovale and R chuck in 2023), with residual facial droop (had right sided and left sided facial droop in past), vascular dementia, seizure disorder, hx of falls, and recurrent UTI.      She initially presented on 5/7/2025 with acute onset speech changes (soft voice), confusion, worsening of baseline cognition, and worse po intake. MRI brain showed multiple small acute infarcts in R frontal lobe near encephalomalacia from prior stroke and could likely have caused worsening of of her baseline symptoms. Stroke thought to be from ICAD.      Neuro signed off 5/10 after stroke workup but was then asked to be reinvolved 5/15 due to persistent somnolence and concern Keppra was contributing to this. Did not recommend new imaging, thought Keppra is unlikely to be contributing to AMS, and exam is similar to prior one. Made recs for ASA, Keppra, and Plavix (which was held before due to possible need for PEG).     Palliative was consulted on 5/15 for goals of care.     Today, the patient was seen for:  Hx of multiple strokes  Goals of care  Support  Palliative encounter            Interval History:     See discussion above regarding care conference with daughters today           Review of Systems:     Besides above, >4 ROS was reviewed and is unremarkable          Medications:     I have reviewed this patient's medication profile and medications during this hospitalization.           Physical Exam:   Vitals were reviewed  Temp: 98.3  F (36.8  C) Temp src: Oral BP:  (!) 151/82 Pulse: 77   Resp: 18 SpO2: 97 % O2 Device: None (Room air)    General: Not in acute distress.  Although she responded at times today with some head nods, as best I could tell she did not demonstrate capacity to process complex medical information, and relies on her daughters for this.  NG FT in place  NGT in place           Data Reviewed:       CMP  Recent Labs   Lab 05/28/25  1208 05/28/25  0748 05/28/25  0433 05/27/25  2358 05/27/25  0827 05/27/25  0516 05/25/25  0806 05/25/25  0612 05/23/25  1206 05/23/25  0553   NA  --   --   --   --   --  139  --  140  --  140   POTASSIUM  --   --   --   --   --  4.6  --  4.4  --  4.4   CHLORIDE  --   --   --   --   --  104  --  106  --  106   CO2  --   --   --   --   --  26  --  25  --  26   ANIONGAP  --   --   --   --   --  9  --  9  --  8   * 203* 126* 118*   < > 213*   < > 176*   < > 124*   BUN  --   --   --   --   --  28.2*  --  28.8*  --  31.0*   CR  --   --   --   --   --  1.45*  --  1.33*  --  1.43*   GFRESTIMATED  --   --   --   --   --  40*  --  44*  --  40*   VERONICA  --   --   --   --   --  9.2  --  8.7*  --  8.6*   PROTTOTAL  --   --   --   --   --  6.1*  --  5.8*  --  5.7*   ALBUMIN  --   --   --   --   --  3.3*  --  3.1*  --  3.0*   BILITOTAL  --   --   --   --   --  0.2  --  0.2  --  0.2   ALKPHOS  --   --   --   --   --  232*  --  225*  --  232*   AST  --   --   --   --   --  43  --  32  --  41   ALT  --   --   --   --   --  32  --  27  --  29    < > = values in this interval not displayed.     CBC  Recent Labs   Lab 05/27/25  0516 05/25/25  0612 05/23/25  0553   WBC 7.4 7.8 6.6   RBC 3.81 3.64* 3.58*   HGB 10.3* 10.1* 9.6*   HCT 33.6* 32.4* 31.2*   MCV 88 89 87   MCH 27.0 27.7 26.8   MCHC 30.7* 31.2* 30.8*   RDW 16.2* 15.9* 16.0*    236 227     INRNo lab results found in last 7 days.  Arterial Blood Gas  No lab results found in last 7 days.

## 2025-05-28 NOTE — PROGRESS NOTES
Care Management Follow Up    Length of Stay (days): 21  Expected Discharge Date: 05/30/2025  Concerns to be Addressed: discharge planning     Patient plan of care discussed at interdisciplinary rounds: Yes  Anticipated Discharge Disposition: Hospice  Anticipated Discharge Services: None  Anticipated Discharge DME: None  Patient/family educated on Medicare website which has current facility and service quality ratings: yes  Education Provided on the Discharge Plan: Yes  Patient/Family in Agreement with the Plan: yes  Referrals Placed by CM/SW: Hospice  Private pay costs discussed: Not applicable  Discussed  Partnership in Safe Discharge Planning  document with patient/family: No  Handoff Completed: No, handoff not indicated or clinically appropriate  Additional Information: Per [facility] 6 Provider, this pt remains medically ready for discharge, pending safe discharge plan.     Writer received a call from pt's EW  Karla Kwadwo (Ph: [phone]). She spoke with pt's daughter Gali at length yesterday about disposition options. She was suggesting to Gali that pt have a referral to [facility]. Writer asked Karla to hold off on making these types of recommendations and to allow the acute care management team to work with family on disposition. Unfortunately the patient does not qualify for [facility] d/t prognosis of weeks to extended weeks. Writer voiced concern that additional recommendations could make disposition even more confusing for family. Karla was able to provide information on insurance coverage for other disposition options. Since the pt has dual Medicare and Medicaid, if family wanted to bring her out of state and provide her 24/7 care, her Medicare would cover home hospice services. MA and waiver services would not work out of state. Pt could go home with Gali with resumption of extensive waiver services and home hospice. Pt's MA could cover LTC  placement and Medicare would cover community hospice. Writer will update Karla once disposition plans are made.    Writer called & spoke with pt's daughter Gali. Gali continues to be torn with disposition options for her mom, but voiced an acceptance that hospice is most appropriate. PTA Gali cared for the pt at home with extensive home services funded by the Elder Waiver. Gali voiced worries about taking pt home on hospice with resumption of waivered home services. Both Felton and Gali are worried about placing their mother in a nursing home with hospice d/t lack of ability to provide intensive oversight on her care. Gali is interested in [facility] as an option, though writer relayed that pt does not appear to currently qualify (writer did confirm with Palliative Provider that pt does NOT qualify for this type of facility d/t prognosis of long to extended weeks). Gali expressed confusion and distrust, because she had just heard from the   that pt would qualify for [facility]. She perseverated on prognosis and ultimately writer offered to have the Palliative team further discuss prognosis. Gali asked for a care conference with the  and IP care management team. Writer offered a care conference for tomorrow at 1pm with Palliative and [facility] Provider. Gali will be in-person and Felton will attend by speaker phone.    ROBIN Villarreal, ANNE MARIE  Clinical , [facility]-7C  Desk Phone: [phone]   Schedule: Wednesday, Thursday, Fridays (for Mondays & Tuesdays, contact job share partner Josselyn Brand)  Securely message with [employer] (Search Name or 7C Med Surg [phone] )  Text page via [facility] Paging/Directory   See signed in provider for up to date coverage information  Social Work & Care Management Department    Weekend And After Hours Care Management Contacts:  After Hours Social Work Coverage [phone] daily: Searchable  "in Vocera \"Adult SW After Hours On Call\"   Weekend Coverage [phone]: Searchable in Vocera \"7C Med Surg SW\" or \"7C Med Surg RNCC\"   "

## 2025-05-28 NOTE — PLAN OF CARE
Goal Outcome Evaluation:      Plan of Care Reviewed With: patient    Overall Patient Progress: no changeOverall Patient Progress: no change  Assumed cares [id number]. Pt non verbal most of shift but nods yes/no and able to follow commands. Did verbalize this AM she wanted to skip heparin dose. Also asked for a sip of chocolate glucerna. VSS on RA, denied pain. BG monitored q4h, insulin given per sliding scale. 2 RPIV SL. TF through NG tube @ goal rate. Incontinence cares done, linen changed, no bowel movement this shift. No acute changes, slept well through the night. Continue with POC.

## 2025-05-28 NOTE — PLAN OF CARE
incontinent of bladder, pt able to help repo q 2 hours, denies pain, able to shake head yes and no at times or point to the words, no BM today, no po intake , remains on TF at goal, blood sugars and insulin per order

## 2025-05-28 NOTE — PROGRESS NOTES
[facility]    Medicine Progress Note - Hospitalist Service, GOLD TEAM 6    Date of Admission:  5/7/2025    Assessment & Plan   Isabell Matthews is a 64 year old female admitted on 5/7/2025. She has a past medical history significant for HTN, T1DM c/b diabetic neuropathy, HLD, orthostatic hypotension, seizures, vascular dementia, prior CVAs, hypothyroidism, recurrent UTIs, and frequent falls. She initially presented to the ED for evaluation of stroke-like symptoms. MRI completed showing acute infarcts for which she was not a candidate for advanced therapies. Remains admitted due to ongoing goals of care discussion between providers and family for disposition.     Goals of care  Remains unclear disposition at this time. Family has been considering hospice based on prior conversations but are still struggling with not allowing her an additional chance at further recovery with a possible TCU stay. This is complicated by NG placement (appears 1 TCU would be able to accommodate this) but is otherwise a barrier. Without NG/TF, it seems unlikely she can maintain adequate intake and her DM1 further complicates this. We will continue to discuss with family to help support best decision for her.  - Plan for repeat care conference on 5/29  - PT/OT/SLP re-evaluating today to give better update regarding rehab options/potential      Multifocal acute cerebral infarcts  Hx of multiple CVAs  Hx of seizures  Presented w/ new findings of dysarthria, dysphagia, decreased responsiveness, confusion. CT head negative for acute infarct or hemorrhage. CTA w/ multifocal stenosis involving L RADHA and R MCA which had progressed since 1/16/25. Stroke Neuro team consulted while in the ED. Loaded with keppra. EEG negative. Found to have UTI so concern presentation was recrudescence of prior CVA deficits however MRI brain obtained showing acute infarcts in R corona radiata, precentral gyrus and operculum.  Not a candidate for advanced therapies. Palliative care consulted this admission given poor neuro recovery with family considering hospice placement.   - Palliative Care following   - Keppra 250mg BID   - Atorvastatin 40mg daily  - DAPT with ASA + Plavix x 90 days from 5/15 (then aspirin lifelong)  - Stroke Neuro follow-up in 8 weeks recommended if not discharged on hospice  - SW following     Acute metabolic encephalopathy, multifactorial  Hx of vascular dementia  Acute change in status noted on arrival, possibly 2/2 acute CVA vs metabolic encephalopathy. Likely multifactorial with UTI, dehydration, hyperglycemia, hypercapnia, and hospital environment contributing, as well as newfound acute CVA as above. No significant electrolyte abnormalities. No suspicious meds. TSH 0.14. Some clinical improvement noted. Patient no longer somnolent or encephalopathic, although her cognitive function does not appear intact. Family reports patient is close to baseline.   - Bedside sitter discontinued since 5/15  - Monitor clinically  - Delirium precautions  - Continue melatonin 1mg at bedtime      Dysphagia s/p NGT placement 5/10  Failed initial swallow study. Likely in the setting of acute encephalopathy and acute stroke. SLP consulted, recommended strict NPO. NG placed 5/10 and started on TF. In prior discussions, PEG not within GOC. SLP re-evaluated 5/22 with patient retaining food in mouth despite multiple cues to follow but when she does eventually there is no evidence of aspiration or dysphagia though at high risk for aspiration due to oral retention. Given this, it is unlikely she will be able to maintain adequate PO intake to prevent malnutrition and NG remains only temporary measure.   - RD following for TF; at goal   - Continue pureed diet with thin liquids and 1:1 supervision  - Calorie counts have been 0   - Further plans pending additional GOC      Possible stress cardiomyopathy  Echo this admission with LVEF 50-55%  with mid ventricular hypokinesis w/ preserved apical and mid segements c/w stress CM. No obvious signs of heart failure. Clinically, she has no s/sx to suggest acutely decompensated HF.  - Continue carvedilol w/ hold parameters     DM1  Hypoglycemia  A1c of 8.8% in 3/2025. Had been NPO due to to dysphagia and now on TF. Labile blood sugars and hypoglycemia noted in setting of infection. PTA regimen includes lantus 18 units at bedtime.   - Endocrine following, appreciate assistance  - Lantus 8 units daily at 1200   - NPH 13 units qAM + 7 units q PM    - Novolog carb coverage with 1 unit per 20 g CHO  - Medium intensity sliding scale q4h while on TF  - BG checks q4h, hypoglycemia protocol   - PRN D10 at 50 mL/hr if tube feeds are stopped or interrupted                 Hypothyroidism  Most recent TSH 0.99. Repeat TSH here 0.14. On admission, daughter noted recent medication adjustments (brand synthroid vs generic) therefore may have been over-replaced. Cannot r/o sick euthyroid state as well.  - Continue PTA PO synthroid 88mcg      CKD stage IIIb  Baseline Cr of 1.4-1.6. Cr improved to 1.3-1.4 with IVF. Suspect chronic dehydration contributing.  - Check BMP intermittently while inpatient      HTN  HLD  -180 on admission. Can allow for permissive hypertension in setting of acute stroke per Stroke Neuro.  - Continue PTA carvedilol 6.25 mg BID w/ hold parameters  - Atorvastatin, ASA, plavix as above   - IV hydralazine PRN for SBP>180     Chronic low back pain  - Diclofenac gel PRN  - Continue PTA duloxetine 20 mg daily, gabapentin 100 mg at bedtime      Seasonal allergies  - Hold PTA loratidine for now to reduce pill burden via NGT     Closed colles' fracture of L wrist  Occurred in April, saw Ortho 4/22/25, but not deemed a surgical candidate d/t her medical comorbidities. Per Ortho, they wanted her in brace and to be NWB status at L wrist. Repeat XR redemonstrated known comminuted intra-articular fracture of  distal left radius. Ortho consulted with recommendations for hand therapy and progressing ROM as tolerate out of brace.   - Coffee cup weight bearing to L wrist.  - Hand therapy if able, ROM as tolerated out of brace     Left wrist rash  Initially felt to be pressure injury from wrist brace. Concern it developed vesicular appearance however VZV negative.   - WOCN consulted     Hypernatremia, resolved  Free water deficit in setting of dysphagia and NPO status. Resolved with hypotonic fluids.      UTI, recurrent, resolved  Urine culture from 5/1/25 grew pan-susceptible E.Coli. Afebrile on arrival, WBC normal. No other obvious source of infection. Repeat UA/UC negative. Completed 7 days of IV Ceftriaxone on 5/12.             Diet: Adult Formula Drip Feeding: Continuous [employer] Standard 1.4; Nasogastric tube; Goal Rate: 45 ( can start at goal ); mL/hr; Initiate at 10ml/hr and advance by 10ml Q8H as tolerated.; Do not advance tube feeding rate unless K+ is = or > 3.0, Mg++...  Pureed Diet (level 4) Thin Liquids (level 0)  Snacks/Supplements Adult: Other; Please send Glucerna with every bkf, lunch and dinner trays; With Meals  Snacks/Supplements Adult: Magic Cup; Between Meals    DVT Prophylaxis: Heparin SQ  Parker Catheter: Not present  Lines: None     Cardiac Monitoring: None  Code Status: No CPR- Do NOT Intubate      Clinically Significant Risk Factors               # Hypoalbuminemia: Lowest albumin = 2.9 g/dL at 5/17/2025  6:01 AM, will monitor as appropriate     # Hypertension: Noted on problem list     # Dementia: noted on problem list         # Severe Malnutrition: based on nutrition assessment and treatment provided per dietitian's recommendations.    # Financial/Environmental Concerns:           Social Drivers of Health    Food Insecurity: Unknown (5/17/2025)    Food Insecurity     Within the past 12 months, did you worry that your food would run out before you got money to buy more?: Patient unable to answer      Within the past 12 months, did the food you bought just not last and you didn t have money to get more?: Patient unable to answer   Depression: At risk (4/2/2025)    PHQ-2     PHQ-2 Score: 4   Housing Stability: Unknown (5/17/2025)    Housing Stability     Do you have housing? : Patient unable to answer     Are you worried about losing your housing?: Patient unable to answer   Tobacco Use: Medium Risk (4/24/2025)    Patient History     Smoking Tobacco Use: Former     Smokeless Tobacco Use: Never   Financial Resource Strain: Unknown (5/17/2025)    Financial Resource Strain     Within the past 12 months, have you or your family members you live with been unable to get utilities (heat, electricity) when it was really needed?: Patient unable to answer   Transportation Needs: Unknown (5/17/2025)    Transportation Needs     Within the past 12 months, has lack of transportation kept you from medical appointments, getting your medicines, non-medical meetings or appointments, work, or from getting things that you need?: Patient unable to answer   Interpersonal Safety: Unknown (5/17/2025)    Interpersonal Safety     Do you feel physically and emotionally safe where you currently live?: Patient unable to answer     Within the past 12 months, have you been hit, slapped, kicked or otherwise physically hurt by someone?: Patient unable to answer     Within the past 12 months, have you been humiliated or emotionally abused in other ways by your partner or ex-partner?: Patient unable to answer          Disposition Plan     Medically Ready for Discharge: Ready Now           The patient's care was discussed with the Attending Physician, Dr. Mcdermott, Bedside Nurse, Care Coordinator/, Patient, and Palliative Care Team.    Sabina Moore PA-C  Hospitalist Service, [address]  Securely message with [employer] (more info)  Text page via [facility] Paging/Directory   See  signed in provider for up to date coverage information  ______________________________________________________________________    Interval History   No acute events overnight. Seen sitting up in her chair, has spilled protein shake on her. Denies any concerns today. Nods yes/no. Reportedly walked with PT with walker and gait belt this morning.     Physical Exam   Vital Signs: Temp: 96.9  F (36.1  C) Temp src: Oral BP: (!) 153/66 Pulse: 73   Resp: 18 SpO2: 97 % O2 Device: None (Room air)    Weight: 130 lbs 8.2 oz    General Appearance:  Awake. Alert. Sitting up in chair. Answers yes/no. No acute distress.   Respiratory: Normal work of breathing on room air.   Skin: Warm, dry.     Medical Decision Making       55 MINUTES SPENT BY ME on the date of service doing chart review, history, exam, documentation & further activities per the note.      Data         Imaging results reviewed over the past 24 hrs:   No results found for this or any previous visit (from the past 24 hours).

## 2025-05-29 ENCOUNTER — APPOINTMENT (OUTPATIENT)
Dept: PHYSICAL THERAPY | Facility: CLINIC | Age: 65
DRG: 064 | End: 2025-05-29
Payer: COMMERCIAL

## 2025-05-29 ENCOUNTER — DOCUMENTATION ONLY (OUTPATIENT)
Dept: MEDSURG UNIT | Facility: CLINIC | Age: 65
End: 2025-05-29

## 2025-05-29 VITALS
SYSTOLIC BLOOD PRESSURE: 179 MMHG | BODY MASS INDEX: 22.93 KG/M2 | HEART RATE: 81 BPM | WEIGHT: 129.41 LBS | TEMPERATURE: 97.8 F | DIASTOLIC BLOOD PRESSURE: 80 MMHG | OXYGEN SATURATION: 94 % | RESPIRATION RATE: 18 BRPM

## 2025-05-29 LAB
GLUCOSE BLDC GLUCOMTR-MCNC: 179 MG/DL (ref 70–99)
GLUCOSE BLDC GLUCOMTR-MCNC: 208 MG/DL (ref 70–99)
GLUCOSE BLDC GLUCOMTR-MCNC: 254 MG/DL (ref 70–99)
GLUCOSE BLDC GLUCOMTR-MCNC: 281 MG/DL (ref 70–99)
GLUCOSE BLDC GLUCOMTR-MCNC: 291 MG/DL (ref 70–99)
GLUCOSE BLDC GLUCOMTR-MCNC: 368 MG/DL (ref 70–99)

## 2025-05-29 PROCEDURE — 99418 PROLNG IP/OBS E/M EA 15 MIN: CPT | Performed by: INTERNAL MEDICINE

## 2025-05-29 PROCEDURE — 250N000013 HC RX MED GY IP 250 OP 250 PS 637

## 2025-05-29 PROCEDURE — 99207 PR APP CREDIT; MD BILLING SHARED VISIT: CPT | Mod: FS | Performed by: PHYSICIAN ASSISTANT

## 2025-05-29 PROCEDURE — 250N000013 HC RX MED GY IP 250 OP 250 PS 637: Performed by: STUDENT IN AN ORGANIZED HEALTH CARE EDUCATION/TRAINING PROGRAM

## 2025-05-29 PROCEDURE — 99418 PROLNG IP/OBS E/M EA 15 MIN: CPT | Mod: FS | Performed by: STUDENT IN AN ORGANIZED HEALTH CARE EDUCATION/TRAINING PROGRAM

## 2025-05-29 PROCEDURE — 250N000013 HC RX MED GY IP 250 OP 250 PS 637: Performed by: INTERNAL MEDICINE

## 2025-05-29 PROCEDURE — 99232 SBSQ HOSP IP/OBS MODERATE 35: CPT | Mod: 24 | Performed by: STUDENT IN AN ORGANIZED HEALTH CARE EDUCATION/TRAINING PROGRAM

## 2025-05-29 PROCEDURE — 120N000002 HC R&B MED SURG/OB UMMC

## 2025-05-29 PROCEDURE — 97530 THERAPEUTIC ACTIVITIES: CPT | Mod: GP

## 2025-05-29 PROCEDURE — 99233 SBSQ HOSP IP/OBS HIGH 50: CPT | Mod: FS | Performed by: STUDENT IN AN ORGANIZED HEALTH CARE EDUCATION/TRAINING PROGRAM

## 2025-05-29 PROCEDURE — 99233 SBSQ HOSP IP/OBS HIGH 50: CPT | Performed by: INTERNAL MEDICINE

## 2025-05-29 PROCEDURE — 250N000011 HC RX IP 250 OP 636

## 2025-05-29 PROCEDURE — 250N000009 HC RX 250: Performed by: STUDENT IN AN ORGANIZED HEALTH CARE EDUCATION/TRAINING PROGRAM

## 2025-05-29 RX ORDER — OXYCODONE HCL 20 MG/ML
2.5 CONCENTRATE, ORAL ORAL
Refills: 0 | Status: DISCONTINUED | OUTPATIENT
Start: 2025-05-29 | End: 2025-06-12 | Stop reason: HOSPADM

## 2025-05-29 RX ORDER — MINERAL OIL/HYDROPHIL PETROLAT
OINTMENT (GRAM) TOPICAL
Status: DISCONTINUED | OUTPATIENT
Start: 2025-05-29 | End: 2025-06-12 | Stop reason: HOSPADM

## 2025-05-29 RX ORDER — CARBOXYMETHYLCELLULOSE SODIUM 5 MG/ML
1-2 SOLUTION/ DROPS OPHTHALMIC
Status: DISCONTINUED | OUTPATIENT
Start: 2025-05-29 | End: 2025-06-12 | Stop reason: HOSPADM

## 2025-05-29 RX ORDER — NALOXONE HYDROCHLORIDE 0.4 MG/ML
0.4 INJECTION, SOLUTION INTRAMUSCULAR; INTRAVENOUS; SUBCUTANEOUS
Status: DISCONTINUED | OUTPATIENT
Start: 2025-05-29 | End: 2025-06-12 | Stop reason: HOSPADM

## 2025-05-29 RX ORDER — METHADONE HYDROCHLORIDE 10 MG/ML
1 CONCENTRATE ORAL EVERY 12 HOURS
Status: DISCONTINUED | OUTPATIENT
Start: 2025-05-29 | End: 2025-06-12 | Stop reason: HOSPADM

## 2025-05-29 RX ORDER — HALOPERIDOL 2 MG/ML
2 SOLUTION ORAL EVERY 6 HOURS PRN
Status: DISCONTINUED | OUTPATIENT
Start: 2025-05-29 | End: 2025-06-12 | Stop reason: HOSPADM

## 2025-05-29 RX ORDER — NALOXONE HYDROCHLORIDE 0.4 MG/ML
0.2 INJECTION, SOLUTION INTRAMUSCULAR; INTRAVENOUS; SUBCUTANEOUS
Status: DISCONTINUED | OUTPATIENT
Start: 2025-05-29 | End: 2025-06-12 | Stop reason: HOSPADM

## 2025-05-29 RX ORDER — LORAZEPAM 2 MG/ML
0.5 CONCENTRATE ORAL EVERY 4 HOURS PRN
Status: DISCONTINUED | OUTPATIENT
Start: 2025-05-29 | End: 2025-06-06

## 2025-05-29 RX ORDER — ATROPINE SULFATE 10 MG/ML
2 SOLUTION/ DROPS OPHTHALMIC
Status: DISCONTINUED | OUTPATIENT
Start: 2025-05-29 | End: 2025-06-12 | Stop reason: HOSPADM

## 2025-05-29 RX ORDER — OXYCODONE HCL 20 MG/ML
5 CONCENTRATE, ORAL ORAL
Refills: 0 | Status: DISCONTINUED | OUTPATIENT
Start: 2025-05-29 | End: 2025-06-12 | Stop reason: HOSPADM

## 2025-05-29 RX ADMIN — ASPIRIN 81 MG CHEWABLE TABLET 324 MG: 81 TABLET CHEWABLE at 08:12

## 2025-05-29 RX ADMIN — INSULIN ASPART 3 UNITS: 100 INJECTION, SOLUTION INTRAVENOUS; SUBCUTANEOUS at 12:24

## 2025-05-29 RX ADMIN — MICONAZOLE NITRATE: 20 CREAM TOPICAL at 08:12

## 2025-05-29 RX ADMIN — Medication 15 ML: at 08:11

## 2025-05-29 RX ADMIN — LEVETIRACETAM 250 MG: 100 SOLUTION ORAL at 22:52

## 2025-05-29 RX ADMIN — Medication 1000 MCG: at 08:12

## 2025-05-29 RX ADMIN — INSULIN ASPART 2 UNITS: 100 INJECTION, SOLUTION INTRAVENOUS; SUBCUTANEOUS at 15:50

## 2025-05-29 RX ADMIN — CARVEDILOL 6.25 MG: 3.12 TABLET, FILM COATED ORAL at 08:12

## 2025-05-29 RX ADMIN — HEPARIN SODIUM 5000 UNITS: 5000 INJECTION, SOLUTION INTRAVENOUS; SUBCUTANEOUS at 13:25

## 2025-05-29 RX ADMIN — ATORVASTATIN CALCIUM 40 MG: 40 TABLET, FILM COATED ORAL at 08:12

## 2025-05-29 RX ADMIN — INSULIN ASPART 1 UNITS: 100 INJECTION, SOLUTION INTRAVENOUS; SUBCUTANEOUS at 03:58

## 2025-05-29 RX ADMIN — DICLOFENAC SODIUM 2 G: 10 GEL TOPICAL at 22:53

## 2025-05-29 RX ADMIN — LEVETIRACETAM 250 MG: 100 SOLUTION ORAL at 08:12

## 2025-05-29 RX ADMIN — INSULIN ASPART 4 UNITS: 100 INJECTION, SOLUTION INTRAVENOUS; SUBCUTANEOUS at 22:48

## 2025-05-29 RX ADMIN — Medication 20 MG: at 08:12

## 2025-05-29 RX ADMIN — INSULIN ASPART 5 UNITS: 100 INJECTION, SOLUTION INTRAVENOUS; SUBCUTANEOUS at 08:10

## 2025-05-29 RX ADMIN — CLOPIDOGREL BISULFATE 75 MG: 75 TABLET, FILM COATED ORAL at 08:12

## 2025-05-29 RX ADMIN — Medication 1 MG: at 22:51

## 2025-05-29 RX ADMIN — HEPARIN SODIUM 5000 UNITS: 5000 INJECTION, SOLUTION INTRAVENOUS; SUBCUTANEOUS at 08:09

## 2025-05-29 RX ADMIN — LEVOTHYROXINE SODIUM 88 MCG: 88 TABLET ORAL at 12:24

## 2025-05-29 RX ADMIN — MICONAZOLE NITRATE: 20 CREAM TOPICAL at 22:53

## 2025-05-29 RX ADMIN — DICLOFENAC SODIUM 2 G: 10 GEL TOPICAL at 08:12

## 2025-05-29 RX ADMIN — METHADONE HYDROCHLORIDE 1 MG: 10 CONCENTRATE ORAL at 22:52

## 2025-05-29 RX ADMIN — DICLOFENAC SODIUM 2 G: 10 GEL TOPICAL at 15:50

## 2025-05-29 ASSESSMENT — ACTIVITIES OF DAILY LIVING (ADL)
ADLS_ACUITY_SCORE: 86
ADLS_ACUITY_SCORE: 88
ADLS_ACUITY_SCORE: 86
ADLS_ACUITY_SCORE: 88
ADLS_ACUITY_SCORE: 86
ADLS_ACUITY_SCORE: 88
ADLS_ACUITY_SCORE: 86

## 2025-05-29 NOTE — PLAN OF CARE
Shift Hours: 0700 - 1900    Assessment:  Body systems assessments were at patient's baseline.        Activity     Fall Risk Score: 110   Bed alarm on? Yes     Activity Assistance Provided: assistance, 1 person      Assistive Device Utilized: walker, gait belt    Pain: no c/o pain    Labs/RN Managed Protocols:   Lines/Drains: PIV, TF via NJ at goal, pt transitioning to comfort focus, will keep TF infusing and BG and insulin coverage for now    Nutrition: TF via NJ, does to swallow bites of food when eats without encouragement from family, generally just have to convince her to spit out the bite of food    Goal Outcome Evaluation             Barriers to Discharge:

## 2025-05-29 NOTE — CONSULTS
Discharge Pharmacy Test Claim    Buprenorphine patches require prior authorization through patient's [facility] Dual Medicare advantage plan. Pharmacy liaison will submit the PA request.    Addendum 11:11 am: PA for buprenorphine patches was approved with $0 copay.    Test Claim Copay   buprenorphine patches 0.00     Karla Frye  [facility] Pharmacy Liaison, [id number]  Ph: [phone]  Fax: [phone]  Available on Teams and [employer]  Disclaimer: Pharmacy test claims are estimates and may not reflect final costs. Suggested alternatives aim to be cost-effective and may not be therapeutically equivalent. This consult is informational and does not constitute medical advice. Clinical decisions should be made by qualified healthcare providers.

## 2025-05-29 NOTE — PROGRESS NOTES
[facility]    Medicine Progress Note - Hospitalist Service, GOLD TEAM 6    Date of Admission:  5/7/2025    Assessment & Plan   Isabell Matthews is a 64 year old female admitted on 5/7/2025. She has a past medical history significant for HTN, T1DM c/b diabetic neuropathy, HLD, orthostatic hypotension, seizures, vascular dementia, prior CVAs, hypothyroidism, recurrent UTIs, and frequent falls. She initially presented to the ED for evaluation of stroke-like symptoms. MRI completed showing acute infarcts for which she was not a candidate for advanced therapies. Ongoing admission due to ongoing goals of care discussion between providers and family for disposition. Care conference on 5/29 with decision to transition Isabell to comfort cares and placing referrals to [facility]. Leaving NG access and TF as well as insulin coverage in the interim to allow for additional family to visit from out of town for now.      Goals of care  Comfort cares  Decision made to transition to comfort cares and discharge to hospice facility (hopeful for OLP) Still awaiting additional out of town family including her son and daughter so decision made to continue her tube feeds and insulin until they are able to visit and will be stopped prior to discharge. Anticipate that once these interventions are stopped that she will have days to a couple weeks.   - Palliative care following and appreciate assistance   - Comfort cares (no vitals, labs, etc)  - Methadone 1 mg q12h  - Lorazepam solution 0.5 mg q4h PRN      Multifocal acute cerebral infarcts  Hx of multiple CVAs  Hx of seizures  Presented w/ new findings of dysarthria, dysphagia, decreased responsiveness, confusion. CT head negative for acute infarct or hemorrhage. CTA w/ multifocal stenosis involving L RADHA and R MCA which had progressed since 1/16/25. Stroke Neuro team consulted while in the ED. Loaded with keppra. EEG negative. Found  to have UTI so concern presentation was recrudescence of prior CVA deficits however MRI brain obtained showing acute infarcts in R corona radiata, precentral gyrus and operculum. Not a candidate for advanced therapies.   - Stopping DAPT, atorvastatin due to comfort cares focus in discussion with family  - Continue keppra 250 mg BID (plan to transition to scheduled ativan once NG removed)      Acute metabolic encephalopathy, multifactorial  Hx of vascular dementia  Acute change in status noted on arrival, possibly 2/2 acute CVA vs metabolic encephalopathy. Likely multifactorial with UTI, dehydration, hyperglycemia, hypercapnia, and hospital environment contributing, as well as newfound acute CVA as above. No significant electrolyte abnormalities. No suspicious meds. TSH 0.14. Some clinical improvement noted. Patient no longer somnolent or encephalopathic, although her cognitive function does not appear intact. Family reports patient is close to baseline.   - Delirium precautions  - Continue melatonin 1mg at bedtime -- stopping once NG removed     Dysphagia s/p NGT placement 5/10  Failed initial swallow study. Likely in the setting of acute encephalopathy and acute stroke. SLP consulted, recommended strict NPO. NG placed 5/10 and started on TF. In prior discussions, PEG not within GOC. SLP re-evaluated 5/22 with patient retaining food in mouth despite multiple cues to follow but when she does eventually there is no evidence of aspiration or dysphagia though at high risk for aspiration due to oral retention. Given this, it is unlikely she will be able to maintain adequate PO intake to prevent malnutrition.   - RD following for TF; at goal   - Plan to remove NG and stop TF after this weekend  - Continue pureed diet with thin liquids and 1:1 supervision    DM1  Hypoglycemia  A1c of 8.8% in 3/2025. Had been NPO due to to dysphagia and now on TF. Labile blood sugars and hypoglycemia noted in setting of prior infection. PTA  regimen includes lantus 18 units at bedtime. Updated Endocrine regarding plan to stop TF after this weekend and to simplify insulin to basal only.  - Endocrine following, appreciate assistance   - Lantus 8 units daily at 1200   - NPH 13 units qAM + 7 units q PM    - Novolog carb coverage with 1 unit per 20 g CHO  - Medium intensity sliding scale q4h while on TF  - BG checks q4h, hypoglycemia protocol   - PRN D10 at 50 mL/hr if tube feeds are stopped or interrupted      Possible stress cardiomyopathy  Echo this admission with LVEF 50-55% with mid ventricular hypokinesis w/ preserved apical and mid segements c/w stress CM. No obvious signs of heart failure. Clinically, she has no s/sx to suggest acutely decompensated HF.  - Stopping carvedilol due to comfort cares     Hypothyroidism  Most recent TSH 0.99. Repeat TSH here 0.14. On admission, daughter noted recent medication adjustments (brand synthroid vs generic) therefore may have been over-replaced. Cannot r/o sick euthyroid state as well.  - Continue PTA PO synthroid 88mcg (plan to stop once NG removed)     CKD stage IIIb  Baseline Cr of 1.4-1.6. Cr improved to 1.3-1.4 with IVF. Suspect chronic dehydration contributing.     HTN  HLD  -180 on admission. Can allow for permissive hypertension in setting of acute stroke per Stroke Neuro.  - Stopping medications due to comfort cares     Chronic low back pain  - Diclofenac gel PRN  - Decreasing duloxetine from 20 mg daily to 10 mg daily with plan to stop prior to discharge   - Stopping gabapentin     Closed colles' fracture of L wrist  Occurred in April, saw Ortho 4/22/25, but not deemed a surgical candidate d/t her medical comorbidities. Per Ortho, they wanted her in brace and to be NWB status at L wrist. Repeat XR redemonstrated known comminuted intra-articular fracture of distal left radius. Ortho consulted with recommendations for hand therapy and progressing ROM as tolerate out of brace.   - Coffee cup weight  bearing to L wrist.  - Hand therapy if able, ROM as tolerated out of brace     Left wrist rash  Initially felt to be pressure injury from wrist brace. Concern it developed vesicular appearance however VZV negative.   - WOCN consulted     Hypernatremia, resolved  Free water deficit in setting of dysphagia and NPO status. Resolved with hypotonic fluids.      UTI, recurrent, resolved  Urine culture from 5/1/25 grew pan-susceptible E.Coli. Afebrile on arrival, WBC normal. No other obvious source of infection. Repeat UA/UC negative. Completed 7 days of IV Ceftriaxone on 5/12.          Diet: Adult Formula Drip Feeding: Continuous [employer] Standard 1.4; Nasogastric tube; Goal Rate: 45 ( can start at goal ); mL/hr; Initiate at 10ml/hr and advance by 10ml Q8H as tolerated.; Do not advance tube feeding rate unless K+ is = or > 3.0, Mg++...  Pureed Diet (level 4) Thin Liquids (level 0)  Snacks/Supplements Adult: Other; Please send Glucerna with every bkf, lunch and dinner trays; With Meals  Snacks/Supplements Adult: Magic Cup; Between Meals    DVT Prophylaxis: Comfort cares  Parker Catheter: Not present  Lines: None     Cardiac Monitoring: None  Code Status: No CPR- Do NOT Intubate      Clinically Significant Risk Factors               # Hypoalbuminemia: Lowest albumin = 2.9 g/dL at 5/17/2025  6:01 AM, will monitor as appropriate     # Hypertension: Noted on problem list     # Dementia: noted on problem list         # Severe Malnutrition: based on nutrition assessment and treatment provided per dietitian's recommendations.    # Financial/Environmental Concerns:           Social Drivers of Health    Food Insecurity: Unknown (5/17/2025)    Food Insecurity     Within the past 12 months, did you worry that your food would run out before you got money to buy more?: Patient unable to answer     Within the past 12 months, did the food you bought just not last and you didn t have money to get more?: Patient unable to answer    Depression: At risk (4/2/2025)    PHQ-2     PHQ-2 Score: 4   Housing Stability: Unknown (5/17/2025)    Housing Stability     Do you have housing? : Patient unable to answer     Are you worried about losing your housing?: Patient unable to answer   Tobacco Use: Medium Risk (4/24/2025)    Patient History     Smoking Tobacco Use: Former     Smokeless Tobacco Use: Never   Financial Resource Strain: Unknown (5/17/2025)    Financial Resource Strain     Within the past 12 months, have you or your family members you live with been unable to get utilities (heat, electricity) when it was really needed?: Patient unable to answer   Transportation Needs: Unknown (5/17/2025)    Transportation Needs     Within the past 12 months, has lack of transportation kept you from medical appointments, getting your medicines, non-medical meetings or appointments, work, or from getting things that you need?: Patient unable to answer   Interpersonal Safety: Unknown (5/17/2025)    Interpersonal Safety     Do you feel physically and emotionally safe where you currently live?: Patient unable to answer     Within the past 12 months, have you been hit, slapped, kicked or otherwise physically hurt by someone?: Patient unable to answer     Within the past 12 months, have you been humiliated or emotionally abused in other ways by your partner or ex-partner?: Patient unable to answer          Disposition Plan     Medically Ready for Discharge: Ready Now           The patient's care was discussed with the Attending Physician, Dr. Mcdermott, Bedside Nurse, Care Coordinator/, Patient, Patient's Family, and Palliative and Endocrinology Team.    Sabina Moore PA-C  Hospitalist Service, GOLD TEAM [address] [place] [address]  Securely message with [employer] Funding (more info)  Text page via [facility] Paging/Directory   See signed in provider for up to date coverage  information  ______________________________________________________________________    Interval History   No acute events overnight. Isabell not answering any questions even with yes/no for me today.     Care conference with daughter Gali, palliative, our medicine team and SW today with decision made to place referral for OLP. Discussed that once TF are stopped and NG removed, we would de-escalate some of her insulin and suspect Isabell would pass within 1-2 weeks. Gali agreeable to starting comfort measures now with her highest priority remaining keeping her mom from suffering. We discussed stopping medications that are no longer promoting comfort or preventing harm and keeping the TF on until additional family can get here this weekend.     Physical Exam   Vital Signs: Temp: 98.1  F (36.7  C) Temp src: Oral BP: (!) 156/75 Pulse: 94   Resp: 20 SpO2: 94 % O2 Device: None (Room air)    Weight: 130 lbs 8.2 oz    General Appearance:  Awake. Alert. Laying in bed. No acute distress.   Respiratory: Normal work of breathing on room air.   Skin: Warm, dry.    Medical Decision Making       55 MINUTES SPENT BY ME on the date of service doing chart review, history, exam, documentation & further activities per the note.      Data         Imaging results reviewed over the past 24 hrs:   No results found for this or any previous visit (from the past 24 hours).

## 2025-05-29 NOTE — PROGRESS NOTES
Inpatient Diabetes Management Service: Daily Progress Note     HPI: Isabell Matthews is a 64 year old female with history of  T1DM with diabetic neuropathy, CKD3b, HTN, HLD, orthostatic hypotension, seizures, vascular dementia, hypothyroidism, recurrent UTI's, and frequent falls who was admitted to [facility] 5/7/2025 with concern for stroke after presenting with acute neurologic symptoms. Hospital course has been complicated by labile BG and dysphagia.      IDS consulted to assist with glycemic management as well as optimization while inpatient and when applicable, recommendations for transition home.         Assessment/Plan:     Assessment:  Type 1 Diabetes Mellitus c/b peripheral neuropathy, nephropathy, Sub-optimal control. (A1c 8.8 % 3/23/25) in presence of complex illness and anemia.   Vascular dementia with acute cerebral infarcts new findings of dysarthria, dysphagia, decreased responsiveness, confusion   Labile BG 2/2 Tube feedings  BMI: 22     Plan/Recommendations:  ** patient has T1DM - requires insulin, basal dose must not to be withheld entirely OR for prolonged periods, high risk for DKA. If trending >250 mg/dL longer than 4 hours, please draw appropriate labs to determine if DKA and treat accordingly **   - Lantus 8 units q 24 hrs at 1200 [DKA protective dose]  - NPH 13 unit(s) at 0900 with tube feeding (Desi Hits 1.4 at 45 ml/hr). Hold if TF held.  - NPH 7  unit(s) at 2100 with tube feeding (Desi Hits 1.4 at 45 ml/hr). Hold if TF held.   - PRN D10 at 75 ml/hr if tube feeds are stopped/interrupted (dosed Desi [employer] Standard 1.4 @ goal 45 mL/hr) dosing provides 75% of CHO that would have been given by TF   - Novolog Carb Coverage: 1 unit per 20 grams CHO TID with meals and PRN with snacks/supplements  - Novolog Correction Scale: 1:50>140 q4 hours while minimal/no PO intake (medium resistance)  - BG checks: q4hr while minimal/no PO intake  - Hypoglycemia protocol    - Carb counting  "protocol      Per ADA guidelines, treatment goals and recommendations for older adults with DM and medically complexity is less stringent BG control/A1c for safety - targets of 110-180 mg/dL and up to 250 mg/dL reasonable - albeit not persistent for those with T1DM. Avoid reliance on A1c. Glucose decisions should be based on avoidance of hypoglycemia and symptomatic hyperglycemia.         Discussion: Tolerating TF at goal rate. No PO intake documented yesterday albeit BG increased from 108 mg/dL at 1600 to 258 mg/dL at 2000, question if patient ate. Per RN, patient's daughter gave her about 6 bites of food yesterday but patient pockets food in her mouth. BG started to improve overnight with correction then increased this morning for unknown reason. ? Malia phenomenon. Will continue current diabetes regimen today.     Discharge Planning: (tentative) --> likely TCU at discharge   Medications: TBD  - adjustment of PTA dosing with addition of NPH to cover tube feeds as indicated   Needs set doses for day program, would be able to use ICR at home.   Test Claims:  none needed.   Education:  Needs to be assessed closer to discharge.    Outpatient Follow-up: PCP- Dr Kellogg ( LOV 4/24/25), until able to establish with [facility] Endocrinology- has appointment with Mai Figueroa 8/4/25    Please notify Inpatient Diabetes Service if changes are planned to steroids, nutrition, TPN/TF and anticipated procedures requiring prolonged NPO status.         Interval History/Review of Systems :   - The last 24 hours progress and nursing notes reviewed.  - Care conference today, 5/29.   - Patient nods \"no\" to pain, nausea, or emesis.     Planned Procedures/Surgeries: none    Inpatient Glucose Control:       Recent Labs   Lab 05/29/25  0355 05/28/25  2347 05/28/25  2020 05/28/25  1618 05/28/25  1208 05/28/25  0748   * 245* 258* 108* 149* 203*             Medications Impacting Glycemia:   Steroids: none  D5W containing " solutions/medications: none   Other medications impacting glucose: none        Nutrition:   Orders Placed This Encounter      Pureed Diet (level 4) Thin Liquids (level 0)  Supplements: glucerna with breakfast, lunch, and dinner trays, magic cup at bedtime   TPN: none   TF:   5/13 - 5/17: been at [employer]sm[employer] 1.5 goal of 40 ml/hr since 5/13, provides 195 g cho in 24hrs  5/17 - current:  continuous [employer] Standard 1.4 @ goal 45 mL/hr provides 170 g cho in 24 hrs         Diabetes History: see full consult note for complete diabetes history   Diabetes Type and Duration: DM for > 40 years, diagnosed in her 40s. Per daughter,  initially told she has T2DM then T1DM for a while then flipped back to being told T2DM.      6/17/2024 positive GAD65 antibody and c-peptide <0.1 but BG elevated     PTA Medication Regimen:   From discharge 3/30/25:- daughter not present to confirm   - Lantus 18 units once daily at HS  - Lispro ICR 1:15 TID AC, 1:20 PRN with snacks/supplements  - Lispro correction 1:50>150 TID AC, >200 HS     At discharge had discussed challenges around glucose lability and dosing: Lantus dose of 18 units may be too high if patient is not eating. However, daughter notes that with more dramatic changes patient will sometimes rise to 400s and has been in DKA before which is hard to balance. If she is concerned about mom going low at night, will try to give her a snack to support Lantus dosing. She is working to resume CGM to more closely monitor blood sugars.  Missing doses?: unknown, daughter not present to confirm   Historical Diabetes Medications: various insulins and dosing      Glucose monitoring device/frequency/trends: Historically has used glucometer before meals and at bedtime. Has tried and failed multiple CGMs (issues with accuracy or patient picking CGM off).   Current trends unknown as daughter not present to discuss- historically known to be labile with needs dropping dramatically when NPO     Hypoglycemia PTA:   - Frequency: currently unknown, but historically labile.   - Severity: + history of severe unconscious lows   - Awareness: variable, not intact    - Treatment: candy, juice       Outpatient Diabetes Provider: Current PCP: Dr Kellogg ( LOV 4/24/25) ; seen by Dr Bony Castaneda in the past (LOV 10/7/24)   Endo referral placed by PCP and IDS last admission, Scheduled with Mai Figueroa, PAC 8/4/25   Formal Diabetes Education/Educator: none recently        Physical Exam:   BP (!) 156/75 (BP Location: Left arm)   Pulse 94   Temp 98.1  F (36.7  C) (Oral)   Resp 20   Wt 59.2 kg (130 lb 8.2 oz)   SpO2 94%   BMI 23.13 kg/m    General:  well appearing, NAD, sitting up in chair, TF on at goal  Lungs: unlabored breathing on RA.  Mental Status:  unable to assess   Psych:   eyes open and tracking, nods yes/no to questions            Data:     Lab Results   Component Value Date    A1C 8.8 (H) 03/23/2025    A1C 9.6 (H) 01/16/2025    A1C 11.2 (H) 09/23/2024    A1C 10.2 (H) 05/20/2024    A1C 10.7 (H) 01/29/2024    A1C 7.8 (H) 06/21/2021    A1C 11.7 (H) 09/27/2020       ROUTINE IP LABS (Last four results)  BMP  Recent Labs   Lab 05/29/25  0355 05/28/25  2347 05/28/25 2020 05/28/25  1618 05/27/25  0827 05/27/25  0516 05/25/25  0806 05/25/25  0612 05/23/25  1206 05/23/25  0553   NA  --   --   --   --   --  139  --  140  --  140   POTASSIUM  --   --   --   --   --  4.6  --  4.4  --  4.4   CHLORIDE  --   --   --   --   --  104  --  106  --  106   VERONICA  --   --   --   --   --  9.2  --  8.7*  --  8.6*   CO2  --   --   --   --   --  26  --  25  --  26   BUN  --   --   --   --   --  28.2*  --  28.8*  --  31.0*   CR  --   --   --   --   --  1.45*  --  1.33*  --  1.43*   * 245* 258* 108*   < > 213*   < > 176*   < > 124*    < > = values in this interval not displayed.     CBC  Recent Labs   Lab 05/27/25  0516 05/25/25  0612 05/23/25  0553   WBC 7.4 7.8 6.6   RBC 3.81 3.64* 3.58*   HGB 10.3* 10.1* 9.6*   HCT  33.6* 32.4* 31.2*   MCV 88 89 87   MCH 27.0 27.7 26.8   MCHC 30.7* 31.2* 30.8*   RDW 16.2* 15.9* 16.0*    236 227     Inpatient Diabetes Service will continue to follow, please don't hesitate to contact the team with any questions or concerns.     Karrie Flood PA-C  Inpatient Diabetes Service  Pager: [phone]  Available on vocera    Plan discussed with patient, bedside RN, and primary team via this note.    To contact Inpatient Diabetes Service:     7 AM - 5 PM: Page the IDS BULMARO following the patient that day (see filed or incomplete progress notes/consult notes under Endocrinology)    OR if uncertain of provider assignment: page job code [phone]    5 PM - 7 AM: First call after hours is to primary service.    For urgent after-hours questions, page job code for on call fellow: [phone]     I spent a total of 40 minutes on the date of the encounter doing prep/post-work, chart review, history and exam, documentation and further activities per the note including lab review, multidisciplinary communication, counseling the patient and/or coordinating care regarding acute hyper/hypoglycemic management, as well as discharge management and planning/communication.

## 2025-05-29 NOTE — PROGRESS NOTES
Prior Authorization Initiated    Medication: BUPRENORPHINE 5 MCG/HR TD PTWK  Insurance Company: [employer] - Phone [phone] Fax [phone]  Filling Pharmacy: [facility] DISCHARGE - [address]  Start Date: 5/29/2025  [facility] Key / Reference #: [id number] / [id number]   Comments:  Proactive PA. Please send a script to the discharge pharmacy.    Karla Frye  [facility] Pharmacy Liaison, MMARI  Ph: [phone]  Fax: [phone]  Available on Teams and Vocera

## 2025-05-29 NOTE — PROGRESS NOTES
Prior Authorization Approval    Medication: BUPRENORPHINE 5 MCG/HR TD PTWK  PA Initiated: 5/29/2025  PA Type: Clinical    Insurance: [employer] - Phone [phone] Fax [phone]  [facility] Key / Reference #: [id number] / [id number]   Authorization Effective Dates: 5/29/2025 - 5/29/2026    Expected CoPay: $ 0.00  CoPay Card Eligible: No    Filling Pharmacy: [facility] DISCHARGE - [address]  Comments:  Proactive PA. Please send a script to the discharge pharmacy.    Karla Frye  [facility] Pharmacy Liaison, M-Z  Ph: [phone]  Fax: [phone]  Available on Teams and Anthony

## 2025-05-29 NOTE — PLAN OF CARE
Speech Language Therapy Discharge Summary    Reason for therapy discharge:    No further expectations of functional progress.    Progress towards therapy goal(s). See goals on Care Plan in [facility] electronic health record for goal details.  No further expectations of functional progress.     Therapy recommendation(s):    Given goal to avoid long-term enteral nutrition, recommend puree diet (IDDSI 4) and thin liquids with 1:1 supervision - pt requires cues to swallow with every bolus and will require extra time to swallow. If pt is not provided with supervision and cues to swallow, her aspiration risk increases. Barrier to safe and adequate PO intake is cognition - no improvement in >2 weeks. Pt not currently an SLP rehab candidate.

## 2025-05-29 NOTE — PLAN OF CARE
Goal Outcome Evaluation:    Shift Hours: 1900 - 0700    Assessment:  Body systems that were not at patient's baseline Gastrointestinal. Focused body system assessments documented in flowsheets.        Activity     Fall Risk Score: 110   Bed alarm on? Yes     Activity Assistance Provided: assistance, 1 person      Assistive Device Utilized: walker, gait belt    Pain: Pt denied    Labs/RN Managed Protocols: BG Q4    Lines/Drains: 2 PIV SL, NG    Nutrition: NG TF, Total feed pureed    Goal Outcome Evaluation    Patient turned self in bed, in new position upon every round, bed alarm on. Utilized pillows as able. Up to commode with 1A/walker, able to verbalize needs occasionally. Shakes head and says yes and no and follows commands. BM and urine incontinence this shift. VSS, on RA. Skin intact.     Barriers to Discharge:     Care conference

## 2025-05-29 NOTE — PROGRESS NOTES
Care Management Follow Up    Length of Stay (days): 22  Expected Discharge Date: 06/01/2025  Concerns to be Addressed: discharge planning     Patient plan of care discussed at interdisciplinary rounds: Yes  Anticipated Discharge Disposition: Hospice  Anticipated Discharge Services: Residential hospice  Anticipated Discharge DME: TBD  Patient/family educated on Medicare website which has current facility and service quality ratings: n/a  Education Provided on the Discharge Plan: Yes  Patient/Family in Agreement with the Plan: yes    Referrals Placed by CM/SW:     PENDING - In-Process  [facility]   [address] [place] [address]   Main Phone [phone]    Fax [phone]  Intake - [place] - Phone: [phone]  [email or web address]    Private pay costs discussed: Not applicable    Discussed  Partnership in Safe Discharge Planning  document with patient/family: Yes:    Handoff Completed: No, handoff not indicated or clinically appropriate    Additional Information:  RNCC starting referral for [facility], based on outcome and prognosis of today's Care Conference. Application completed, CHW gathering needed clinical notes and submitting via fax. VM left with [facility] Admissions - [facility] E., providing general notice of incoming referral and most recent care conference. CM Team to continue to follow and support safe discharge planning.     Next Steps: POLST/HCD; Hospice Referral Order; EMS Les Delgado RN BSN  7C RNCC - Phone: [phone]  Care Management Department    Secure message via [facility] (Search Name or 7C Med Surg [phone] RNCC)  For Weekend & Holiday on call RN Care Coordinator:  * Joaquin (0800 - 1630) Saturday and Sunday    Units: 5A, 5B, & 5C     Units: 6B, 6C, 6D     Units: 7A, 7B, 7C, 7D     Units: 6A/ICU      * [facility] ([phone]) Saturday and Sunday    Units: 6 Med/Surg, 8A, 10 ICU, & Pediatric Units    Units: 5 Ortho,  5Med/Surg & WB ED

## 2025-05-29 NOTE — PROGRESS NOTES
[facility]  Palliative Care Daily Progress Note       Recommendations & Counseling     GOALS OF CARE:   Transition to comfort care plan today.   Continue tube feeding now while awaiting some relatives to visit this weekend and then Sunday/Monday will  discuss stopping tube feeding.   Once tube feedings stop  and NG tube removed, will need to adjust medications given patient not swallowing pills and not eating. See medication recs below.   Work towards discharge to facility with hospice. Family's first choice is [facility].   I think with plan to stop tube feeding plus minimal to no oral intake and very brittle type 1 diabetes, it is reasonable to predict that her prognosis is less than 30 days and therefore I think she meets criteria for referral to [facility]  Will need to complete POLST form prior to discharge  Discussed option of home hospice near family in [facility] (as facility hospice not an option in [place] due to insurance barriers) but daughter did not feel they could manage the hospice care in the home and prefers to look for local facility placement.      ADVANCE CARE PLANNING:  No health care directive on file. Per system policy, Surrogate Decision-makers for Patients With Diminished Decision-making Capacity offers guidance on possible decision-makers. Daughter Tanvir have been identified as a surrogate decision makers.   There is no POLST form on file, defer to patient and/or next of kin for decisions   Code status: No CPR- Do NOT Intubate     MEDICAL MANAGEMENT:     Comfort care plan:  comfort care order placed  stopped non comfort medications  While using NG tube:  continue keppra, duloxetine, melatonin and levothyroxine.   Once NG tube is removed: top keppra, duloxetine, melatonin and levothyroxine and start lorazepam 0.5 mg BID plus prn  For diabetes management with hx DKA:  Of note: Daughter expressed concerns about pt suffering from DKA  symptoms. Given patient is type 1 diabetic and at risk for rapidly developing DKA it would be reasonable and common practice to continue basal insulin to prevent this and also to liberalize glucose goals to <360 to prevent hypoglycemia from over correction. With minimal oral intake kofi be reasonable to limit glucose checks to PRN if symptoms develop, with sliding scale insulin for correction.    Continue current insulin while on tube feeding  When tube feeding stops, anticipate she will not be taking in meaningful calories/sugar and so would be reasonable to stop NPH and continue glargine and prn aspart (with more liberal glucose goals) to prevent rapid DKA. Would be reasonable to ask diabetes management team for any additional recommendations.  If family decides she would not want any life prolonging treatment including insulin, then all insulin including glargine could also be stopped with plan to treat any DKA symptoms with comfort medications. Typically this is done in last days of life.   For pain: (chronic MSK pain)  Started sublingual methadone 1 mg BID  Start Oxycodone 2.5-5 mg q 2 hours prn  Stop gabapentin (unlikely to be helping at 100 mg bedtime and trying to limit medications since she doesn't swallow well  For mood:  Weaning duloxetine (decreased to 10 mg) with plan to stop once NG tube stopped and then start scheduled lorazepam for both seizure ppx and anxiety  Start prn lorazepam for anxiety  Start prn haldol for agitation     PSYCHOSOCIAL/SPIRITUAL SUPPORT:  Family - daughters Vishal and Felton (former is local, latter is in [place])  Some concern for caregiver burnout on Vishal's part, uncertain why she directed providers to speak with Felton instead  [employer] - [employer]    Total time spent was 75 minutes regarding goals of care and comfort care planning on the date of the encounter. These recommendations were given to the primary team via face to face discussion.    Mayra Guerra MD /  Palliative Medicine   Securely message with the [employer] Web Console (learn more here)   Text page via [facility] Paging/Directory           Assessments          Isabell Matthews is a 64 year old female with a past medical history of HTN, HLD, CKD IIIb, type 1 DM c/b by neuropathy, hx of ischemic and hemorrhagic stroke (ischemic R basal ganglia in 2018, hemorrhagic L basal ganglia in 2021, R centrum semiovale and R chuck in 2023), with residual facial droop (had right sided and left sided facial droop in past), vascular dementia, seizure disorder, hx of falls, and recurrent UTI.      She initially presented on 5/7/2025 with acute onset speech changes (soft voice), confusion, worsening of baseline cognition, and worse po intake. MRI brain showed multiple small acute infarcts in R frontal lobe near encephalomalacia from prior stroke and could likely have caused worsening of of her baseline symptoms. Stroke thought to be from ICAD.      Neuro signed off 5/10 after stroke workup but was then asked to be reinvolved 5/15 due to persistent somnolence and concern Keppra was contributing to this. Did not recommend new imaging, thought Keppra is unlikely to be contributing to AMS, and exam is similar to prior one. Made recs for ASA, Keppra, and Plavix (which was held before due to possible need for PEG).     Palliative was consulted on 5/15 for goals of care.     Today, the patient was seen for:  Hx of multiple strokes  Goals of care  Support  Palliative encounter            Interval History:     See discussion above regarding care conference with daughters today    Patient engages minimally - did not head yes/no and indicated some pain in her head and neck.               Medications:     I have reviewed this patient's medication profile and medications during this hospitalization.           Physical Exam:   Vitals were reviewed  Temp: 98.1  F (36.7  C) Temp src: Oral BP: (!) 156/75 Pulse: 94   Resp: 20 SpO2: 94 % O2 Device: None (Room  air)    General: Not in acute distress.  Although she responded at times today with some head nods, as best I could tell she did not demonstrate capacity to process complex medical information, and relies on her daughters for this.  NG FT in place  NGT in place           Data Reviewed:       CMP  Recent Labs   Lab 05/29/25  0807 05/29/25  0355 05/28/25  2347 05/28/25 2020 05/27/25  0827 05/27/25  0516 05/25/25  0806 05/25/25  0612 05/23/25  1206 05/23/25  0553   NA  --   --   --   --   --  139  --  140  --  140   POTASSIUM  --   --   --   --   --  4.6  --  4.4  --  4.4   CHLORIDE  --   --   --   --   --  104  --  106  --  106   CO2  --   --   --   --   --  26  --  25  --  26   ANIONGAP  --   --   --   --   --  9  --  9  --  8   * 179* 245* 258*   < > 213*   < > 176*   < > 124*   BUN  --   --   --   --   --  28.2*  --  28.8*  --  31.0*   CR  --   --   --   --   --  1.45*  --  1.33*  --  1.43*   GFRESTIMATED  --   --   --   --   --  40*  --  44*  --  40*   VERONICA  --   --   --   --   --  9.2  --  8.7*  --  8.6*   PROTTOTAL  --   --   --   --   --  6.1*  --  5.8*  --  5.7*   ALBUMIN  --   --   --   --   --  3.3*  --  3.1*  --  3.0*   BILITOTAL  --   --   --   --   --  0.2  --  0.2  --  0.2   ALKPHOS  --   --   --   --   --  232*  --  225*  --  232*   AST  --   --   --   --   --  43  --  32  --  41   ALT  --   --   --   --   --  32  --  27  --  29    < > = values in this interval not displayed.     CBC  Recent Labs   Lab 05/27/25  0516 05/25/25  0612 05/23/25  0553   WBC 7.4 7.8 6.6   RBC 3.81 3.64* 3.58*   HGB 10.3* 10.1* 9.6*   HCT 33.6* 32.4* 31.2*   MCV 88 89 87   MCH 27.0 27.7 26.8   MCHC 30.7* 31.2* 30.8*   RDW 16.2* 15.9* 16.0*    236 227     INRNo lab results found in last 7 days.  Arterial Blood Gas  No lab results found in last 7 days.

## 2025-05-29 NOTE — PROGRESS NOTES
Care Management Follow Up    Length of Stay (days): 22  Expected Discharge Date: 06/01/2025  Concerns to be Addressed: discharge planning     Patient plan of care discussed at interdisciplinary rounds: Yes  Anticipated Discharge Disposition: Hospice  Anticipated Discharge Services: None  Anticipated Discharge DME: None  Patient/family educated on Medicare website which has current facility and service quality ratings: yes  Education Provided on the Discharge Plan: Yes  Patient/Family in Agreement with the Plan: yes  Referrals Placed by CM/SW: Hospice  Private pay costs discussed: Not applicable  Discussed  Partnership in Safe Discharge Planning  document with patient/family: No   Handoff Completed: No, handoff not indicated or clinically appropriate  Additional Information: Care conference arranged for today at 1pm with Medicine Provider, Palliative Provider, and Care Management Team. Provider Team changed prognosis to days to short weeks. Pt now is appropriate for a referral to [facility]. Referral faxed to facility by care management team.    ROBIN Villarreal, LICSW  Clinical , [facility]-  Desk Phone: [phone]   Securely message with [email or web address] (Search Name or 7C Med Surg  Beds [id number])  Text page via [facility] Paging/Directory   See signed in provider for up to date coverage information  Acute Care Management Department

## 2025-05-30 LAB
GLUCOSE BLDC GLUCOMTR-MCNC: 109 MG/DL (ref 70–99)
GLUCOSE BLDC GLUCOMTR-MCNC: 110 MG/DL (ref 70–99)
GLUCOSE BLDC GLUCOMTR-MCNC: 162 MG/DL (ref 70–99)
GLUCOSE BLDC GLUCOMTR-MCNC: 203 MG/DL (ref 70–99)
GLUCOSE BLDC GLUCOMTR-MCNC: 204 MG/DL (ref 70–99)
GLUCOSE BLDC GLUCOMTR-MCNC: 216 MG/DL (ref 70–99)
GLUCOSE BLDC GLUCOMTR-MCNC: 63 MG/DL (ref 70–99)

## 2025-05-30 PROCEDURE — 99233 SBSQ HOSP IP/OBS HIGH 50: CPT | Performed by: PHYSICIAN ASSISTANT

## 2025-05-30 PROCEDURE — 120N000002 HC R&B MED SURG/OB UMMC

## 2025-05-30 PROCEDURE — 250N000013 HC RX MED GY IP 250 OP 250 PS 637

## 2025-05-30 PROCEDURE — 99232 SBSQ HOSP IP/OBS MODERATE 35: CPT | Performed by: INTERNAL MEDICINE

## 2025-05-30 PROCEDURE — 250N000013 HC RX MED GY IP 250 OP 250 PS 637: Performed by: INTERNAL MEDICINE

## 2025-05-30 PROCEDURE — 250N000013 HC RX MED GY IP 250 OP 250 PS 637: Performed by: STUDENT IN AN ORGANIZED HEALTH CARE EDUCATION/TRAINING PROGRAM

## 2025-05-30 PROCEDURE — 250N000009 HC RX 250: Performed by: INTERNAL MEDICINE

## 2025-05-30 RX ADMIN — Medication 1 MG: at 23:00

## 2025-05-30 RX ADMIN — DICLOFENAC SODIUM 2 G: 10 GEL TOPICAL at 20:38

## 2025-05-30 RX ADMIN — MICONAZOLE NITRATE: 20 CREAM TOPICAL at 20:38

## 2025-05-30 RX ADMIN — INSULIN ASPART 1 UNITS: 100 INJECTION, SOLUTION INTRAVENOUS; SUBCUTANEOUS at 09:48

## 2025-05-30 RX ADMIN — LEVETIRACETAM 250 MG: 100 SOLUTION ORAL at 09:35

## 2025-05-30 RX ADMIN — LEVETIRACETAM 250 MG: 100 SOLUTION ORAL at 20:38

## 2025-05-30 RX ADMIN — METHADONE HYDROCHLORIDE 1 MG: 10 CONCENTRATE ORAL at 22:59

## 2025-05-30 RX ADMIN — LORAZEPAM 0.5 MG: 2 CONCENTRATE ORAL at 01:49

## 2025-05-30 RX ADMIN — LEVOTHYROXINE SODIUM 88 MCG: 88 TABLET ORAL at 12:35

## 2025-05-30 RX ADMIN — DICLOFENAC SODIUM 2 G: 10 GEL TOPICAL at 16:00

## 2025-05-30 RX ADMIN — METHADONE HYDROCHLORIDE 1 MG: 10 CONCENTRATE ORAL at 09:34

## 2025-05-30 RX ADMIN — INSULIN ASPART 2 UNITS: 100 INJECTION, SOLUTION INTRAVENOUS; SUBCUTANEOUS at 06:32

## 2025-05-30 RX ADMIN — Medication 10 MG: at 09:35

## 2025-05-30 RX ADMIN — MICONAZOLE NITRATE: 20 CREAM TOPICAL at 09:37

## 2025-05-30 RX ADMIN — DICLOFENAC SODIUM 2 G: 10 GEL TOPICAL at 12:35

## 2025-05-30 RX ADMIN — DICLOFENAC SODIUM 2 G: 10 GEL TOPICAL at 09:36

## 2025-05-30 RX ADMIN — INSULIN ASPART 2 UNITS: 100 INJECTION, SOLUTION INTRAVENOUS; SUBCUTANEOUS at 23:01

## 2025-05-30 RX ADMIN — INSULIN ASPART 2 UNITS: 100 INJECTION, SOLUTION INTRAVENOUS; SUBCUTANEOUS at 02:56

## 2025-05-30 ASSESSMENT — ACTIVITIES OF DAILY LIVING (ADL)
ADLS_ACUITY_SCORE: 86
ADLS_ACUITY_SCORE: 89
ADLS_ACUITY_SCORE: 89
ADLS_ACUITY_SCORE: 86
ADLS_ACUITY_SCORE: 89
ADLS_ACUITY_SCORE: 86
ADLS_ACUITY_SCORE: 89
ADLS_ACUITY_SCORE: 89
ADLS_ACUITY_SCORE: 86
ADLS_ACUITY_SCORE: 88
ADLS_ACUITY_SCORE: 89
ADLS_ACUITY_SCORE: 86
ADLS_ACUITY_SCORE: 89

## 2025-05-30 NOTE — PROGRESS NOTES
[facility]  Palliative Care Daily Progress Note       Recommendations & Counseling     GOALS OF CARE:   Comfort care plus tube feeding through weekend while family comes to visit.   Once tube feedings stop (likely Monday) and NG tube removed, will need to adjust medications given patient not swallowing pills and not eating. See medication recs below.   Work towards discharge to facility with hospice. Family's first choice is [facility].   I think with plan to stop tube feeding plus minimal to no oral intake and very brittle type 1 diabetes, it is reasonable to predict that her prognosis is less than 30 days and therefore I think she meets criteria for referral to [facility]  Will need to complete POLST form prior to discharge  We have discussed option of home hospice near family in [facility] (as facility hospice not an option in [place] due to insurance barriers) but daughter did not feel they could manage the hospice care in the home and prefers to look for local facility placement.      ADVANCE CARE PLANNING:  No health care directive on file. Per system policy, Surrogate Decision-makers for Patients With Diminished Decision-making Capacity offers guidance on possible decision-makers. Daughter Tanvir have been identified as a surrogate decision makers.   There is no POLST form on file, defer to patient and/or next of kin for decisions   Code status: No CPR- Do NOT Intubate     MEDICAL MANAGEMENT:     Comfort care plan:  comfort care order placed  stopped non comfort medications  While using NG tube:  continue keppra, duloxetine, melatonin and levothyroxine.   Once NG tube is removed: top keppra, duloxetine, melatonin and levothyroxine and start lorazepam 0.5 mg BID plus prn  For diabetes management with hx DKA:  Of note: Daughter expressed concerns about pt suffering from DKA symptoms. Given patient is type 1 diabetic and at risk for rapidly developing DKA it  would be reasonable and common practice to continue basal insulin to prevent this and also to liberalize glucose goals to <360 to prevent hypoglycemia from over correction. With minimal oral intake kofi be reasonable to limit glucose checks to PRN if symptoms develop, with sliding scale insulin for correction.    Continue current insulin while on tube feeding  When tube feeding stops, anticipate she will not be taking in meaningful calories/sugar and so would be reasonable to stop NPH and continue glargine and prn aspart (with more liberal glucose goals) to prevent rapid DKA. Would be reasonable to ask diabetes management team for any additional recommendations.  If family decides she would not want any life prolonging treatment including insulin, then all insulin including glargine could also be stopped with plan to treat any DKA symptoms with comfort medications. Typically this is done in last days of life.   For pain: (chronic MSK pain)  Started sublingual methadone 1 mg BID  Start Oxycodone 2.5-5 mg q 2 hours prn  Stop gabapentin (unlikely to be helping at 100 mg bedtime and trying to limit medications since she doesn't swallow well  For mood:  Weaning duloxetine (decreased to 10 mg) with plan to stop once NG tube stopped and then start scheduled lorazepam for both seizure ppx and anxiety  Start prn lorazepam for anxiety  Start prn haldol for agitation     PSYCHOSOCIAL/SPIRITUAL SUPPORT:  Family - daughters Vishal and Felton (former is local, latter is in [place])  Some concern for caregiver burnout on Vishal's part, uncertain why she directed providers to speak with Felton instead  [employer] - [employer]        Discussed case with  unit SW and messaged with medicine team.      Mayra Guerra MD / Palliative Medicine   Securely message with the [employer] Web Console (learn more here)   Text page via [employer] Paging/Directory           Assessments          Isabell Matthews is a 64 year old female with a past medical  history of HTN, HLD, CKD IIIb, type 1 DM c/b by neuropathy, hx of ischemic and hemorrhagic stroke (ischemic R basal ganglia in 2018, hemorrhagic L basal ganglia in 2021, R centrum semiovale and R chuck in 2023), with residual facial droop (had right sided and left sided facial droop in past), vascular dementia, seizure disorder, hx of falls, and recurrent UTI.      She initially presented on 5/7/2025 with acute onset speech changes (soft voice), confusion, worsening of baseline cognition, and worse po intake. MRI brain showed multiple small acute infarcts in R frontal lobe near encephalomalacia from prior stroke and could likely have caused worsening of of her baseline symptoms. Stroke thought to be from ICAD.      Neuro signed off 5/10 after stroke workup but was then asked to be reinvolved 5/15 due to persistent somnolence and concern Keppra was contributing to this. Did not recommend new imaging, thought Keppra is unlikely to be contributing to AMS, and exam is similar to prior one. Made recs for ASA, Keppra, and Plavix (which was held before due to possible need for PEG).     Palliative was consulted on 5/15 for goals of care.     Today, the patient was seen for:  Hx of multiple strokes  Goals of care  Support  Palliative encounter            Interval History:     No acute events  Care conference yesterday    Patient engages minimally - she was sleeping at time of my visit today               Medications:     I have reviewed this patient's medication profile and medications during this hospitalization.           Physical Exam:   Vitals were reviewed  Temp: 98.3  F (36.8  C) Temp src: Oral BP: (!) 183/90 Pulse: 87   Resp: 16 SpO2: 97 % O2 Device: None (Room air)    General: Not in acute distress.    Unlabored regular rate of breathing  NGT in place  sleeping           Data Reviewed:       CMP  Recent Labs   Lab 05/30/25  1356 05/30/25  1314 05/30/25  0924 05/30/25  0630 05/27/25  0827 05/27/25  0516 05/25/25  0806  05/25/25  0612   NA  --   --   --   --   --  139  --  140   POTASSIUM  --   --   --   --   --  4.6  --  4.4   CHLORIDE  --   --   --   --   --  104  --  106   CO2  --   --   --   --   --  26  --  25   ANIONGAP  --   --   --   --   --  9  --  9   * 63* 162* 203*   < > 213*   < > 176*   BUN  --   --   --   --   --  28.2*  --  28.8*   CR  --   --   --   --   --  1.45*  --  1.33*   GFRESTIMATED  --   --   --   --   --  40*  --  44*   VERONICA  --   --   --   --   --  9.2  --  8.7*   PROTTOTAL  --   --   --   --   --  6.1*  --  5.8*   ALBUMIN  --   --   --   --   --  3.3*  --  3.1*   BILITOTAL  --   --   --   --   --  0.2  --  0.2   ALKPHOS  --   --   --   --   --  232*  --  225*   AST  --   --   --   --   --  43  --  32   ALT  --   --   --   --   --  32  --  27    < > = values in this interval not displayed.     CBC  Recent Labs   Lab 05/27/25  0516 05/25/25 0612   WBC 7.4 7.8   RBC 3.81 3.64*   HGB 10.3* 10.1*   HCT 33.6* 32.4*   MCV 88 89   MCH 27.0 27.7   MCHC 30.7* 31.2*   RDW 16.2* 15.9*    236     INRNo lab results found in last 7 days.  Arterial Blood Gas  No lab results found in last 7 days.    Medical Decision Making       MANAGEMENT DISCUSSED with the following over the past 24 hours: unit SW and medicine team   NOTE(S)/MEDICAL RECORDS REVIEWED over the past 24 hours: medicine, SW, nursing, MAR review

## 2025-05-30 NOTE — PLAN OF CARE
Occupational Therapy Discharge Summary    Reason for therapy discharge:    OT orders cancelled by provider. Patient transitioning to comfort cares.

## 2025-05-30 NOTE — PROGRESS NOTES
Inpatient Diabetes Management Service: Daily Progress Note     BRIEF NOTE:     HPI: Isabell Matthews is a 64 year old female with history of  T1DM with diabetic neuropathy, CKD3b, HTN, HLD, orthostatic hypotension, seizures, vascular dementia, hypothyroidism, recurrent UTI's, and frequent falls who was admitted to [facility] 5/7/2025 with concern for stroke after presenting with acute neurologic symptoms. Hospital course has been complicated by labile BG and dysphagia. IDS consulted to assist with glycemic management as well as optimization while inpatient and when applicable, recommendations for transition home.         Assessment/Plan:     Assessment:  Type 1 Diabetes Mellitus c/b peripheral neuropathy, nephropathy, Sub-optimal control. (A1c 8.8 % 3/23/25) in presence of complex illness and anemia.   Vascular dementia with acute cerebral infarcts new findings of dysarthria, dysphagia, decreased responsiveness, confusion   Labile BG 2/2 Tube feedings  BMI: 22    Inpatient Diabetes Service Signing off 05/30/2025 as patient is going comfort cares.   Please call back with any questions. When ready to discontinue Tube Feeding, must be discontinued 12 hours AFTER last dose of NPH insulin is given to prevent hypoglycemia, primary team is aware.      Plan/Recommendations:  ** patient has T1DM - requires insulin, basal dose must not to be withheld entirely OR for prolonged periods, high risk for DKA. **   - Lantus 8 units q 24 hrs at 1200 [DKA protective dose]  - NPH 13 unit(s) at 0900 with tube feeding (Biodel 1.4 at 45 ml/hr). Hold if TF held.  - NPH 7  unit(s) at 2100 with tube feeding (Biodel 1.4 at 45 ml/hr). Hold if TF held.   - PRN D10 at 75 ml/hr if tube feeds are stopped/interrupted (dosed Desi Self-A-r-T Standard 1.4 @ goal 45 mL/hr) dosing provides 75% of CHO that would have been given by TF   - discontinue, patient not eating Novolog Carb Coverage: 1 unit per 20 grams CHO TID with meals and PRN  with snacks/supplements  - Novolog Correction Scale: 1:50>140 q4 hours while minimal/no PO intake (medium resistance)  - BG checks: q4hr while minimal/no PO intake  - Hypoglycemia protocol    - Carb counting protocol     Discussion: BG overall trending a little high with no episodes of hypoglycemia. Spoke with primary team, patient transitioning to comfort cares and will likely discontinue TF on 6/1 or 6/2. Tight BG control is not necessary, goal to avoid hypoglycemia. Will keep Lantus only after TF discontinued. IDS will sign off on patient at this time, continue current regimen until TF is discontinued. TF must be discontinued 12 hours AFTER last dose of NPH insulin is given to prevent hypoglycemia, primary team is aware.       Karrie Flood PA-C  Inpatient Diabetes Service  Pager: [phone]  Available on [employer]    Plan discussed with primary team via [employer].    To contact Inpatient Diabetes Service:     7 AM - 5 PM: Page the IDS BULMARO following the patient that day (see filed or incomplete progress notes/consult notes under Endocrinology)    OR if uncertain of provider assignment: page job code [phone]    5 PM - 7 AM: First call after hours is to primary service.    For urgent after-hours questions, page job code for on call fellow: [phone]     Patient not seen today, no charge.

## 2025-05-30 NOTE — PLAN OF CARE
Shift Hours: 1900 - 0700    Assessment:  Body systems that were not at patient's baseline Cognitive/Behavioral/Neuro. Focused body system assessments documented in flowsheets.        Activity     Fall Risk Score: 110   Bed alarm on? Yes     Activity Assistance Provided: assistance, 1 person      Assistive Device Utilized: walker, gait belt    Pain: FOZIA - pt reported no pain and visually expressed no pain.    Labs/RN Managed Protocols: q4 b;lood sugars     Lines/Drains: x2 R PIV    Nutrition: TF, pureed diet, thin liquids     Goal Outcome Evaluation      Pt showed no expression of pain during shift. Pt showed signs of agitation (wide eyes, attempting to lave bed, touching NG tube) - ativan given with good effect (pt was able to fall asleep after). Pt repositioned q2 hrs. TF running without complication. No acute changes or significant events during shift.       Barriers to Discharge:   Wait for family to visit on 6/1

## 2025-05-30 NOTE — PLAN OF CARE
Goal Outcome Evaluation:         Pt was made Comfort Care since 5/29, has been stable, open eyes to verbal command but has not speaking so far this shift, alert at times but unable to determine level of orientation. Pt shook her head when this write asked if she was in pain, appeared comfortable resting in bed, slightly lethargic, unable to risk feeding pt orally. TF running at 45 mL/hr (goal rate) with flushes in btwn for now. Plan was to stop TF poss on Monday after family members able to come in on Sunday . BG q. 4hrs, last BG was 63, 2 apple juice given via NG. Bg came up to 110 at recheck. Inc of B&B, last BM this am. Continue to monitor.

## 2025-05-30 NOTE — PROGRESS NOTES
Care Management Follow Up    Length of Stay (days): 23  Expected Discharge Date: 06/01/2025  Concerns to be Addressed: discharge planning     Patient plan of care discussed at interdisciplinary rounds: Yes  Anticipated Discharge Disposition: Hospice  Anticipated Discharge Services: None  Anticipated Discharge DME: None  Patient/family educated on Medicare website which has current facility and service quality ratings: yes  Education Provided on the Discharge Plan: Yes  Patient/Family in Agreement with the Plan: yes  Referrals Placed by CM/SW: Hospice  Private pay costs discussed: Not applicable  Discussed  Partnership in Safe Discharge Planning  document with patient/family: No   Handoff Completed: No, handoff not indicated or clinically appropriate  Additional Information: @0900 Writer called and LVM with Mynor, intake liaison for [facility], to follow-up on the referral and determine bed availability. Bed availability not until mid next week. Pt is not appropriate for their facility-not bed bound. Consulted with [facility] Provider and family is planning on visiting over the weekend, then with TF and insulin discontinued early next week the pt is anticipated to look more appropriate for this setting. A new referral can be sent to [facility] at this point. Writer called & LVM for pt's daughter Gali to update her.    [facility] (Ph: [phone]; F: [phone])  [address] [place] [address]  5/29: Referral placed.    ROBIN Villarreal LICSW  Clinical , [facility]  Desk Phone: [phone]   Schedule: Wednesday, Thursday, Fridays (for Mondays & Tuesdays, contact job share partner Josselyn Brand)  Securely message with [employer] (Search Name or 7C Med Surg [phone] )  Text page via [facility] Paging/Directory   See signed in provider for up to date coverage information  Social Work & Care Management Department    Weekend And After Hours Care Management Contacts:  After  "Hours Social Work Coverage [phone] daily: Searchable in Vocera \"Adult SW After Hours On Call\"   Weekend Coverage [phone]: Searchable in Vocera \"7C Med Surg SW\" or \"7C Med Surg RNCC\"     "

## 2025-05-30 NOTE — PROGRESS NOTES
[facility]    Medicine Progress Note - Hospitalist Service, GOLD TEAM 6    Date of Admission:  5/7/2025    Assessment & Plan   Isabell Matthews is a 64 year old female admitted on 5/7/2025. She has a past medical history significant for HTN, T1DM c/b diabetic neuropathy, HLD, orthostatic hypotension, seizures, vascular dementia, prior CVAs, hypothyroidism, recurrent UTIs, and frequent falls. She initially presented to the ED for evaluation of stroke-like symptoms. MRI completed showing acute infarcts for which she was not a candidate for advanced therapies. Ongoing admission due to ongoing goals of care discussion between providers and family for disposition. Care conference on 5/29 with decision to transition Isabell to comfort cares and placing referrals for hospice with preference for [facility]. Leaving NG access and TF as well as insulin coverage in the interim to allow for additional family to visit from out of town for now.      Goals of care  Comfort cares  Decision made to transition to comfort cares and discharge to hospice facility (hopeful for [facility]) Still awaiting additional out of town family including her son and daughter so decision made to continue her tube feeds and insulin until they are able to visit and will be stopped prior to discharge. Anticipate that once these interventions are stopped that she will have days to a couple weeks. [facility]LP denied on 5/30 due to patient not being bed bound however highly suspect this will be the case after nutrition stopped. Given barriers to alternative locations with insurance, plan to have [facility] re-evaluate after TF stopped.   - Palliative care following and appreciate assistance   - Comfort cares (no vitals, labs, etc)  - Methadone 1 mg q12h  - Lorazepam solution 0.5 mg q4h PRN      Multifocal acute cerebral infarcts  Hx of multiple CVAs  Hx of seizures  Presented w/ new findings of dysarthria, dysphagia, decreased responsiveness,  confusion. CT head negative for acute infarct or hemorrhage. CTA w/ multifocal stenosis involving L RADHA and R MCA which had progressed since 1/16/25. Stroke Neuro team consulted while in the ED. Loaded with keppra. EEG negative. Found to have UTI so concern presentation was recrudescence of prior CVA deficits however MRI brain obtained showing acute infarcts in R corona radiata, precentral gyrus and operculum. Not a candidate for advanced therapies.   - Stopping DAPT, atorvastatin due to comfort cares focus in discussion with family  - Continue keppra 250 mg BID (plan to transition to scheduled ativan once NG removed)      Acute metabolic encephalopathy, multifactorial  Hx of vascular dementia  Acute change in status noted on arrival, possibly 2/2 acute CVA vs metabolic encephalopathy. Likely multifactorial with UTI, dehydration, hyperglycemia, hypercapnia, and hospital environment contributing, as well as newfound acute CVA as above. No significant electrolyte abnormalities. No suspicious meds. TSH 0.14. Some clinical improvement noted. Patient no longer somnolent or encephalopathic, although her cognitive function does not appear intact. Family reports patient is close to baseline.   - Delirium precautions  - Continue melatonin 1mg at bedtime -- stopping once NG removed     Dysphagia s/p NGT placement 5/10  Failed initial swallow study. Likely in the setting of acute encephalopathy and acute stroke. SLP consulted, recommended strict NPO. NG placed 5/10 and started on TF. In prior discussions, PEG not within GOC. SLP re-evaluated 5/22 with patient retaining food in mouth despite multiple cues to follow but when she does eventually there is no evidence of aspiration or dysphagia though at high risk for aspiration due to oral retention. Given this, it is unlikely she will be able to maintain adequate PO intake to prevent malnutrition.   - RD following for TF; at goal   - Plan to remove NG and stop TF after this  weekend  - Continue pureed diet with thin liquids and 1:1 supervision     DM1  Hypoglycemia  A1c of 8.8% in 3/2025. Had been NPO due to to dysphagia and now on TF. Labile blood sugars and hypoglycemia noted in setting of prior infection. PTA regimen includes lantus 18 units at bedtime. Updated Endocrine regarding plan to stop TF after this weekend and to simplify insulin to basal only.  - Endocrine signing off  - Lantus 8 units daily at 1200   - NPH 13 units qAM + 7 units q PM    - Novolog carb coverage with 1 unit per 20 g CHO  - Medium intensity sliding scale q4h while on TF  - BG checks q4h, hypoglycemia protocol   - PRN D10 at 50 mL/hr if tube feeds are stopped or interrupted   - When ready to stop TF - stop NPH first and wait 12 hours prior to discontinuation of TF to prevent hypoglycemia and continue lantus only     Possible stress cardiomyopathy  Echo this admission with LVEF 50-55% with mid ventricular hypokinesis w/ preserved apical and mid segements c/w stress CM. No obvious signs of heart failure. Clinically, she has no s/sx to suggest acutely decompensated HF.  - Stopping carvedilol due to comfort cares     Hypothyroidism  Most recent TSH 0.99. Repeat TSH here 0.14. On admission, daughter noted recent medication adjustments (brand synthroid vs generic) therefore may have been over-replaced. Cannot r/o sick euthyroid state as well.  - Continue PTA PO synthroid 88mcg (plan to stop once NG removed)     CKD stage IIIb  Baseline Cr of 1.4-1.6. Cr improved to 1.3-1.4 with IVF. Suspect chronic dehydration contributing.     HTN  HLD  -180 on admission. Can allow for permissive hypertension in setting of acute stroke per Stroke Neuro.  - Stopping medications due to comfort cares     Chronic low back pain  - Diclofenac gel PRN  - Decreasing duloxetine from 20 mg daily to 10 mg daily with plan to stop prior to discharge   - Stopping gabapentin     Closed colles' fracture of L wrist  Occurred in April, saw  Ortho 4/22/25, but not deemed a surgical candidate d/t her medical comorbidities. Per Ortho, they wanted her in brace and to be NWB status at L wrist. Repeat XR redemonstrated known comminuted intra-articular fracture of distal left radius. Ortho consulted with recommendations for hand therapy and progressing ROM as tolerate out of brace.   - Coffee cup weight bearing to L wrist.  - Hand therapy if able, ROM as tolerated out of brace     Left wrist rash  Initially felt to be pressure injury from wrist brace. Concern it developed vesicular appearance however VZV negative.   - WOCN consulted     Hypernatremia, resolved  Free water deficit in setting of dysphagia and NPO status. Resolved with hypotonic fluids.      UTI, recurrent, resolved  Urine culture from 5/1/25 grew pan-susceptible E.Coli. Afebrile on arrival, WBC normal. No other obvious source of infection. Repeat UA/UC negative. Completed 7 days of IV Ceftriaxone on 5/12.          Diet: Adult Formula Drip Feeding: Continuous Room 77 Standard 1.4; Nasogastric tube; Goal Rate: 45 ( can start at goal ); mL/hr; Initiate at 10ml/hr and advance by 10ml Q8H as tolerated.; Do not advance tube feeding rate unless K+ is = or > 3.0, Mg++...  Pureed Diet (level 4) Thin Liquids (level 0)  Snacks/Supplements Adult: Other; Please send Glucerna with every bkf, lunch and dinner trays; With Meals  Snacks/Supplements Adult: Magic Cup; Between Meals    DVT Prophylaxis: Comfort cares  Parker Catheter: Not present  Lines: None     Cardiac Monitoring: None  Code Status: No CPR- Do NOT Intubate      Clinically Significant Risk Factors               # Hypoalbuminemia: Lowest albumin = 2.9 g/dL at 5/17/2025  6:01 AM, will monitor as appropriate     # Hypertension: Noted on problem list     # Dementia: noted on problem list         # Severe Malnutrition: based on nutrition assessment and treatment provided per dietitian's recommendations.    # Financial/Environmental Concerns:            Social Drivers of Health    Food Insecurity: Unknown (5/17/2025)    Food Insecurity     Within the past 12 months, did you worry that your food would run out before you got money to buy more?: Patient unable to answer     Within the past 12 months, did the food you bought just not last and you didn t have money to get more?: Patient unable to answer   Depression: At risk (4/2/2025)    PHQ-2     PHQ-2 Score: 4   Housing Stability: Unknown (5/17/2025)    Housing Stability     Do you have housing? : Patient unable to answer     Are you worried about losing your housing?: Patient unable to answer   Tobacco Use: Medium Risk (4/24/2025)    Patient History     Smoking Tobacco Use: Former     Smokeless Tobacco Use: Never   Financial Resource Strain: Unknown (5/17/2025)    Financial Resource Strain     Within the past 12 months, have you or your family members you live with been unable to get utilities (heat, electricity) when it was really needed?: Patient unable to answer   Transportation Needs: Unknown (5/17/2025)    Transportation Needs     Within the past 12 months, has lack of transportation kept you from medical appointments, getting your medicines, non-medical meetings or appointments, work, or from getting things that you need?: Patient unable to answer   Interpersonal Safety: Unknown (5/17/2025)    Interpersonal Safety     Do you feel physically and emotionally safe where you currently live?: Patient unable to answer     Within the past 12 months, have you been hit, slapped, kicked or otherwise physically hurt by someone?: Patient unable to answer     Within the past 12 months, have you been humiliated or emotionally abused in other ways by your partner or ex-partner?: Patient unable to answer          Disposition Plan     Medically Ready for Discharge: Ready Now           The patient's care was discussed with the Attending Physician, Dr. Mcdermott, Bedside Nurse, and Care Coordinator/.    Sabina KOENIG  HEBERT Moore  Hospitalist Service, GOLD TEAM [address] [place] [address]  Securely message with [employer] (more info)  Text page via [employer] Paging/Directory   See signed in provider for up to date coverage information  ______________________________________________________________________    Interval History   No acute events overnight. Sleeping this morning, did not wake her.     Physical Exam   Vital Signs: Temp: 98.3  F (36.8  C) Temp src: Oral BP: (!) 183/90 Pulse: 87   Resp: 16 SpO2: 97 % O2 Device: None (Room air)    Weight: 129 lbs 6.56 oz    General Appearance:  Sleeping. No acute distress.   Cardiovascular: RRR.   Respiratory: Normal work of breathing on room air.   Skin: Warm, dry.    Medical Decision Making       55 MINUTES SPENT BY ME on the date of service doing chart review, history, exam, documentation & further activities per the note.      Data         Imaging results reviewed over the past 24 hrs:   No results found for this or any previous visit (from the past 24 hours).

## 2025-05-30 NOTE — PROGRESS NOTES
Physical Therapy Discharge Summary    Reason for therapy discharge:    Per chart review, pt has transitioned to comfort cares. Hospice facility being pursued for discharge at this time.     Therapy recommendation(s):    PT Orders discontinued by provider

## 2025-05-30 NOTE — PROGRESS NOTES
"CLINICAL NUTRITION SERVICES    Informed decision made to change pt s status to comfort care. \"Continue tube feeding now while awaiting some relatives to visit this weekend and then Sunday/Monday will discuss stopping tube feeding\" per 5/29 palliative note. Nutrition supplements discontinued and no further interventions planned at this time. TF to be discontinued per pt preference. RD can be consulted if needed.    RD signing off on 5/30/2025.     Juma Cruz, AKSHAT, LD  Available on [facility]  Weekend/Holiday AKSHAT Cruz - \"Weekend Clinical Dietitian\"   "

## 2025-05-31 LAB
GLUCOSE BLDC GLUCOMTR-MCNC: 121 MG/DL (ref 70–99)
GLUCOSE BLDC GLUCOMTR-MCNC: 151 MG/DL (ref 70–99)
GLUCOSE BLDC GLUCOMTR-MCNC: 158 MG/DL (ref 70–99)
GLUCOSE BLDC GLUCOMTR-MCNC: 175 MG/DL (ref 70–99)
GLUCOSE BLDC GLUCOMTR-MCNC: 226 MG/DL (ref 70–99)
GLUCOSE BLDC GLUCOMTR-MCNC: 228 MG/DL (ref 70–99)

## 2025-05-31 PROCEDURE — 250N000013 HC RX MED GY IP 250 OP 250 PS 637

## 2025-05-31 PROCEDURE — 250N000013 HC RX MED GY IP 250 OP 250 PS 637: Performed by: STUDENT IN AN ORGANIZED HEALTH CARE EDUCATION/TRAINING PROGRAM

## 2025-05-31 PROCEDURE — 120N000002 HC R&B MED SURG/OB UMMC

## 2025-05-31 PROCEDURE — 99233 SBSQ HOSP IP/OBS HIGH 50: CPT | Mod: FS | Performed by: STUDENT IN AN ORGANIZED HEALTH CARE EDUCATION/TRAINING PROGRAM

## 2025-05-31 PROCEDURE — 250N000013 HC RX MED GY IP 250 OP 250 PS 637: Performed by: INTERNAL MEDICINE

## 2025-05-31 PROCEDURE — 250N000009 HC RX 250: Performed by: INTERNAL MEDICINE

## 2025-05-31 PROCEDURE — 99207 PR APP CREDIT; MD BILLING SHARED VISIT: CPT | Performed by: PHYSICIAN ASSISTANT

## 2025-05-31 PROCEDURE — 99418 PROLNG IP/OBS E/M EA 15 MIN: CPT | Mod: FS | Performed by: STUDENT IN AN ORGANIZED HEALTH CARE EDUCATION/TRAINING PROGRAM

## 2025-05-31 RX ADMIN — Medication 10 MG: at 09:15

## 2025-05-31 RX ADMIN — MICONAZOLE NITRATE: 20 CREAM TOPICAL at 09:16

## 2025-05-31 RX ADMIN — LEVETIRACETAM 250 MG: 100 SOLUTION ORAL at 19:47

## 2025-05-31 RX ADMIN — INSULIN ASPART 2 UNITS: 100 INJECTION, SOLUTION INTRAVENOUS; SUBCUTANEOUS at 06:28

## 2025-05-31 RX ADMIN — DICLOFENAC SODIUM 2 G: 10 GEL TOPICAL at 19:39

## 2025-05-31 RX ADMIN — METHADONE HYDROCHLORIDE 1 MG: 10 CONCENTRATE ORAL at 22:10

## 2025-05-31 RX ADMIN — DICLOFENAC SODIUM 2 G: 10 GEL TOPICAL at 12:21

## 2025-05-31 RX ADMIN — METHADONE HYDROCHLORIDE 1 MG: 10 CONCENTRATE ORAL at 10:13

## 2025-05-31 RX ADMIN — MICONAZOLE NITRATE: 20 CREAM TOPICAL at 19:40

## 2025-05-31 RX ADMIN — LEVOTHYROXINE SODIUM 88 MCG: 88 TABLET ORAL at 09:15

## 2025-05-31 RX ADMIN — LEVETIRACETAM 250 MG: 100 SOLUTION ORAL at 09:15

## 2025-05-31 RX ADMIN — Medication 1 MG: at 22:10

## 2025-05-31 RX ADMIN — DICLOFENAC SODIUM 2 G: 10 GEL TOPICAL at 16:30

## 2025-05-31 RX ADMIN — INSULIN ASPART 2 UNITS: 100 INJECTION, SOLUTION INTRAVENOUS; SUBCUTANEOUS at 02:33

## 2025-05-31 RX ADMIN — INSULIN ASPART 1 UNITS: 100 INJECTION, SOLUTION INTRAVENOUS; SUBCUTANEOUS at 22:20

## 2025-05-31 RX ADMIN — DICLOFENAC SODIUM 2 G: 10 GEL TOPICAL at 09:15

## 2025-05-31 RX ADMIN — INSULIN ASPART 1 UNITS: 100 INJECTION, SOLUTION INTRAVENOUS; SUBCUTANEOUS at 10:13

## 2025-05-31 RX ADMIN — INSULIN ASPART 1 UNITS: 100 INJECTION, SOLUTION INTRAVENOUS; SUBCUTANEOUS at 18:24

## 2025-05-31 ASSESSMENT — ACTIVITIES OF DAILY LIVING (ADL)
ADLS_ACUITY_SCORE: 97
ADLS_ACUITY_SCORE: 96
ADLS_ACUITY_SCORE: 88
ADLS_ACUITY_SCORE: 96
ADLS_ACUITY_SCORE: 97
ADLS_ACUITY_SCORE: 96
ADLS_ACUITY_SCORE: 101
ADLS_ACUITY_SCORE: 96
ADLS_ACUITY_SCORE: 89
ADLS_ACUITY_SCORE: 96
ADLS_ACUITY_SCORE: 96
ADLS_ACUITY_SCORE: 101
ADLS_ACUITY_SCORE: 97
ADLS_ACUITY_SCORE: 96

## 2025-05-31 NOTE — PROGRESS NOTES
[facility]    Medicine Progress Note - Hospitalist Service, GOLD TEAM 6    Date of Admission:  5/7/2025    Assessment & Plan   Isabell Matthews is a 64 year old female admitted on 5/7/2025. She has a past medical history significant for HTN, T1DM c/b diabetic neuropathy, HLD, orthostatic hypotension, seizures, vascular dementia, prior CVAs, hypothyroidism, recurrent UTIs, and frequent falls. She initially presented to the ED for evaluation of stroke-like symptoms. MRI completed showing acute infarcts for which she was not a candidate for advanced therapies. Ongoing admission due to ongoing goals of care discussion between providers and family for disposition. Care conference on 5/29 with decision to transition Isabell to comfort cares and placing referrals for hospice with preference for [facility]LP. Leaving NG access and TF as well as insulin coverage in the interim to allow for additional family to visit from out of town for now.      Goals of care  Comfort cares  Decision made to transition to comfort cares and discharge to hospice facility (hopeful for OLP) Still awaiting additional out of town family including her son and daughter so decision made to continue her tube feeds and insulin until they are able to visit and will be stopped prior to discharge. Anticipate that once these interventions are stopped that she will have days to a couple weeks. OLP denied on 5/30 due to patient not being bed bound however highly suspect this will be the case after nutrition stopped. Given barriers to alternative locations with insurance, plan to have OLP re-evaluate after TF stopped.   - Palliative care following and appreciate assistance   - Comfort cares (no vitals, labs, etc)  - Methadone 1 mg q12h  - Lorazepam solution 0.5 mg q4h PRN      Multifocal acute cerebral infarcts  Hx of multiple CVAs  Hx of seizures  Presented w/ new findings of dysarthria, dysphagia, decreased responsiveness,  confusion. CT head negative for acute infarct or hemorrhage. CTA w/ multifocal stenosis involving L RADHA and R MCA which had progressed since 1/16/25. Stroke Neuro team consulted while in the ED. Loaded with keppra. EEG negative. Found to have UTI so concern presentation was recrudescence of prior CVA deficits however MRI brain obtained showing acute infarcts in R corona radiata, precentral gyrus and operculum. Not a candidate for advanced therapies.   - Stopping DAPT, atorvastatin due to comfort cares focus in discussion with family  - Continue keppra 250 mg BID (plan to transition to scheduled ativan once NG removed)      Acute metabolic encephalopathy, multifactorial  Hx of vascular dementia  Acute change in status noted on arrival, possibly 2/2 acute CVA vs metabolic encephalopathy. Likely multifactorial with UTI, dehydration, hyperglycemia, hypercapnia, and hospital environment contributing, as well as newfound acute CVA as above. No significant electrolyte abnormalities. No suspicious meds. TSH 0.14. Some clinical improvement noted. Patient no longer somnolent or encephalopathic, although her cognitive function does not appear intact. Family reports patient is close to baseline.   - Delirium precautions  - Continue melatonin 1mg at bedtime -- stopping once NG removed     Dysphagia s/p NGT placement 5/10  Failed initial swallow study. Likely in the setting of acute encephalopathy and acute stroke. SLP consulted, recommended strict NPO. NG placed 5/10 and started on TF. In prior discussions, PEG not within GOC. SLP re-evaluated 5/22 with patient retaining food in mouth despite multiple cues to follow but when she does eventually there is no evidence of aspiration or dysphagia though at high risk for aspiration due to oral retention. Given this, it is unlikely she will be able to maintain adequate PO intake to prevent malnutrition.   - RD following for TF; at goal   - Plan to remove NG and stop TF after this  weekend  - Continue pureed diet with thin liquids and 1:1 supervision     DM1  Hypoglycemia  A1c of 8.8% in 3/2025. Had been NPO due to to dysphagia and now on TF. Labile blood sugars and hypoglycemia noted in setting of prior infection. PTA regimen includes lantus 18 units at bedtime. Updated Endocrine regarding plan to stop TF after this weekend and to simplify insulin to basal only.  - Endocrine signing off  - Lantus 8 units daily at 1200   - NPH 13 units qAM + 7 units q PM    - Novolog carb coverage with 1 unit per 20 g CHO  - Medium intensity sliding scale q4h while on TF  - BG checks q4h, hypoglycemia protocol   - PRN D10 at 50 mL/hr if tube feeds are stopped or interrupted   - When ready to stop TF - stop NPH first and wait 12 hours prior to discontinuation of TF to prevent hypoglycemia and continue lantus only     Possible stress cardiomyopathy  Echo this admission with LVEF 50-55% with mid ventricular hypokinesis w/ preserved apical and mid segements c/w stress CM. No obvious signs of heart failure. Clinically, she has no s/sx to suggest acutely decompensated HF.  - Stopping carvedilol due to comfort cares     Hypothyroidism  Most recent TSH 0.99. Repeat TSH here 0.14. On admission, daughter noted recent medication adjustments (brand synthroid vs generic) therefore may have been over-replaced. Cannot r/o sick euthyroid state as well.  - Continue PTA PO synthroid 88mcg (plan to stop once NG removed)     CKD stage IIIb  Baseline Cr of 1.4-1.6. Cr improved to 1.3-1.4 with IVF. Suspect chronic dehydration contributing.     HTN  HLD  -180 on admission. Can allow for permissive hypertension in setting of acute stroke per Stroke Neuro.  - Stopping medications due to comfort cares     Chronic low back pain  - Diclofenac gel PRN  - Decreasing duloxetine from 20 mg daily to 10 mg daily with plan to stop prior to discharge   - Stopping gabapentin     Closed colles' fracture of L wrist  Occurred in April, saw  Ortho 4/22/25, but not deemed a surgical candidate d/t her medical comorbidities. Per Ortho, they wanted her in brace and to be NWB status at L wrist. Repeat XR redemonstrated known comminuted intra-articular fracture of distal left radius. Ortho consulted with recommendations for hand therapy and progressing ROM as tolerate out of brace.   - Coffee cup weight bearing to L wrist.  - Hand therapy if able, ROM as tolerated out of brace     Left wrist rash  Initially felt to be pressure injury from wrist brace. Concern it developed vesicular appearance however VZV negative.   - WOCN consulted     Hypernatremia, resolved  Free water deficit in setting of dysphagia and NPO status. Resolved with hypotonic fluids.      UTI, recurrent, resolved  Urine culture from 5/1/25 grew pan-susceptible E.Coli. Afebrile on arrival, WBC normal. No other obvious source of infection. Repeat UA/UC negative. Completed 7 days of IV Ceftriaxone on 5/12.          Diet: Adult Formula Drip Feeding: Continuous [employer] (ML4D) Standard 1.4; Nasogastric tube; Goal Rate: 45 ( can start at goal ); mL/hr; Initiate at 10ml/hr and advance by 10ml Q8H as tolerated.; Do not advance tube feeding rate unless K+ is = or > 3.0, Mg++...  Pureed Diet (level 4) Thin Liquids (level 0)    DVT Prophylaxis: Comfort cares  Parker Catheter: Not present  Lines: None     Cardiac Monitoring: None  Code Status: No CPR- Do NOT Intubate      Clinically Significant Risk Factors               # Hypoalbuminemia: Lowest albumin = 2.9 g/dL at 5/17/2025  6:01 AM, will monitor as appropriate     # Hypertension: Noted on problem list     # Dementia: noted on problem list         # Severe Malnutrition: based on nutrition assessment and treatment provided per dietitian's recommendations.    # Financial/Environmental Concerns:           Social Drivers of Health    Food Insecurity: Unknown (5/17/2025)    Food Insecurity     Within the past 12 months, did you worry that your food would run  out before you got money to buy more?: Patient unable to answer     Within the past 12 months, did the food you bought just not last and you didn t have money to get more?: Patient unable to answer   Depression: At risk (4/2/2025)    PHQ-2     PHQ-2 Score: 4   Housing Stability: Unknown (5/17/2025)    Housing Stability     Do you have housing? : Patient unable to answer     Are you worried about losing your housing?: Patient unable to answer   Tobacco Use: Medium Risk (4/24/2025)    Patient History     Smoking Tobacco Use: Former     Smokeless Tobacco Use: Never   Financial Resource Strain: Unknown (5/17/2025)    Financial Resource Strain     Within the past 12 months, have you or your family members you live with been unable to get utilities (heat, electricity) when it was really needed?: Patient unable to answer   Transportation Needs: Unknown (5/17/2025)    Transportation Needs     Within the past 12 months, has lack of transportation kept you from medical appointments, getting your medicines, non-medical meetings or appointments, work, or from getting things that you need?: Patient unable to answer   Interpersonal Safety: Unknown (5/17/2025)    Interpersonal Safety     Do you feel physically and emotionally safe where you currently live?: Patient unable to answer     Within the past 12 months, have you been hit, slapped, kicked or otherwise physically hurt by someone?: Patient unable to answer     Within the past 12 months, have you been humiliated or emotionally abused in other ways by your partner or ex-partner?: Patient unable to answer          Disposition Plan     Medically Ready for Discharge: Ready Now           The patient's care was discussed with the Attending Physician, Dr. Mcdermott.    Sabina Moore PA-C  Hospitalist Service, GOLD TEAM [address] [place] [address]  Securely message with [employer] (more info)  Text page via [facility] Paging/Directory   See signed in provider  for up to date coverage information  ______________________________________________________________________    Interval History   No acute issues overnight. Sleeping and appears comfortable.     Physical Exam   Vital Signs: Temp: 97.9  F (36.6  C) Temp src: Oral BP: (!) 142/64 Pulse: 84   Resp: 16 SpO2: 96 % O2 Device: None (Room air)    Weight: 132 lbs 7.94 oz    General Appearance:  Sleeping. No acute distress.   Respiratory: Normal work of breathing on room air.   Skin: Warm, dry.     Medical Decision Making       40 MINUTES SPENT BY ME on the date of service doing chart review, history, exam, documentation & further activities per the note.      Data         Imaging results reviewed over the past 24 hrs:   No results found for this or any previous visit (from the past 24 hours).

## 2025-05-31 NOTE — PLAN OF CARE
Goal Outcome Evaluation:      Plan of Care Reviewed With: patient    Overall Patient Progress: no changeOverall Patient Progress: no change       ,   Poor appetite. Does not open mouth when instructed.  Pt moves independently in bed. Encouraged to turn q 2-3 hrs, follows commands.  PAINAD score 0. Pt not verbal this shift.  Incontinent of B&B. Incontinence cares completed multiple times this shift.  NG tube in place. TF running 45 ml/hr continuously.  Cody GRIMES.

## 2025-05-31 NOTE — PLAN OF CARE
Goal Outcome Evaluation:    Plan of Care Reviewed With: patient    Overall Patient Progress: no change  Overall Patient Progress: no change    Outcome Evaluation: Assumed care from [id number]. Alert, FOZIA orientation, patient is unable to communicate yet follows commands well. Tolerated repositioning Q2 hours well. Incontinent of bowel and bladder - incontinence cares completed x4. 2 R PIV's intact, SL. NG intact - TF infusing at 45mL/hr. BG monitored Q4 (228, 226) - sliding scale administered as ordered. Bed alarm remains on. Continue with plan of care.     Candelaria Obrien RN

## 2025-06-01 LAB
GLUCOSE BLDC GLUCOMTR-MCNC: 110 MG/DL (ref 70–99)
GLUCOSE BLDC GLUCOMTR-MCNC: 140 MG/DL (ref 70–99)
GLUCOSE BLDC GLUCOMTR-MCNC: 143 MG/DL (ref 70–99)
GLUCOSE BLDC GLUCOMTR-MCNC: 174 MG/DL (ref 70–99)
GLUCOSE BLDC GLUCOMTR-MCNC: 228 MG/DL (ref 70–99)
GLUCOSE BLDC GLUCOMTR-MCNC: 66 MG/DL (ref 70–99)
GLUCOSE BLDC GLUCOMTR-MCNC: 93 MG/DL (ref 70–99)

## 2025-06-01 PROCEDURE — 99232 SBSQ HOSP IP/OBS MODERATE 35: CPT | Performed by: PHYSICIAN ASSISTANT

## 2025-06-01 PROCEDURE — 250N000013 HC RX MED GY IP 250 OP 250 PS 637: Performed by: INTERNAL MEDICINE

## 2025-06-01 PROCEDURE — 120N000002 HC R&B MED SURG/OB UMMC

## 2025-06-01 PROCEDURE — 250N000013 HC RX MED GY IP 250 OP 250 PS 637: Performed by: STUDENT IN AN ORGANIZED HEALTH CARE EDUCATION/TRAINING PROGRAM

## 2025-06-01 PROCEDURE — 250N000013 HC RX MED GY IP 250 OP 250 PS 637

## 2025-06-01 PROCEDURE — 250N000009 HC RX 250: Performed by: INTERNAL MEDICINE

## 2025-06-01 RX ADMIN — DICLOFENAC SODIUM 2 G: 10 GEL TOPICAL at 11:13

## 2025-06-01 RX ADMIN — DICLOFENAC SODIUM 2 G: 10 GEL TOPICAL at 21:17

## 2025-06-01 RX ADMIN — INSULIN ASPART 1 UNITS: 100 INJECTION, SOLUTION INTRAVENOUS; SUBCUTANEOUS at 21:17

## 2025-06-01 RX ADMIN — LEVETIRACETAM 250 MG: 100 SOLUTION ORAL at 21:19

## 2025-06-01 RX ADMIN — INSULIN ASPART 1 UNITS: 100 INJECTION, SOLUTION INTRAVENOUS; SUBCUTANEOUS at 05:57

## 2025-06-01 RX ADMIN — Medication 1 MG: at 21:17

## 2025-06-01 RX ADMIN — DICLOFENAC SODIUM 2 G: 10 GEL TOPICAL at 09:02

## 2025-06-01 RX ADMIN — INSULIN ASPART 2 UNITS: 100 INJECTION, SOLUTION INTRAVENOUS; SUBCUTANEOUS at 09:18

## 2025-06-01 RX ADMIN — MICONAZOLE NITRATE: 20 CREAM TOPICAL at 21:17

## 2025-06-01 RX ADMIN — METHADONE HYDROCHLORIDE 1 MG: 10 CONCENTRATE ORAL at 21:17

## 2025-06-01 RX ADMIN — DICLOFENAC SODIUM 2 G: 10 GEL TOPICAL at 15:39

## 2025-06-01 RX ADMIN — MICONAZOLE NITRATE: 20 CREAM TOPICAL at 09:02

## 2025-06-01 RX ADMIN — Medication 10 MG: at 09:02

## 2025-06-01 RX ADMIN — METHADONE HYDROCHLORIDE 1 MG: 10 CONCENTRATE ORAL at 09:09

## 2025-06-01 RX ADMIN — LEVOTHYROXINE SODIUM 88 MCG: 88 TABLET ORAL at 09:01

## 2025-06-01 RX ADMIN — INSULIN ASPART 1 UNITS: 100 INJECTION, SOLUTION INTRAVENOUS; SUBCUTANEOUS at 02:08

## 2025-06-01 RX ADMIN — LEVETIRACETAM 250 MG: 100 SOLUTION ORAL at 09:06

## 2025-06-01 ASSESSMENT — ACTIVITIES OF DAILY LIVING (ADL)
ADLS_ACUITY_SCORE: 99
ADLS_ACUITY_SCORE: 101
ADLS_ACUITY_SCORE: 99
ADLS_ACUITY_SCORE: 99
ADLS_ACUITY_SCORE: 95
ADLS_ACUITY_SCORE: 101
ADLS_ACUITY_SCORE: 95
ADLS_ACUITY_SCORE: 101
ADLS_ACUITY_SCORE: 101
ADLS_ACUITY_SCORE: 95
ADLS_ACUITY_SCORE: 101
ADLS_ACUITY_SCORE: 101
ADLS_ACUITY_SCORE: 99
ADLS_ACUITY_SCORE: 95
ADLS_ACUITY_SCORE: 95

## 2025-06-01 NOTE — PROGRESS NOTES
[facility]    Medicine Progress Note - Hospitalist Service, GOLD TEAM 6    Date of Admission:  5/7/2025    Assessment & Plan   Isabell Matthews is a 64 year old female admitted on 5/7/2025. She has a past medical history significant for HTN, T1DM c/b diabetic neuropathy, HLD, orthostatic hypotension, seizures, vascular dementia, prior CVAs, hypothyroidism, recurrent UTIs, and frequent falls. She initially presented to the ED for evaluation of stroke-like symptoms. MRI completed showing acute infarcts for which she was not a candidate for advanced therapies. Ongoing admission due to ongoing goals of care discussion between providers and family for disposition. Care conference on 5/29 with decision to transition Isabell to comfort cares and placing referrals for hospice with preference for OLP. Leaving NG access and TF as well as insulin coverage in the interim to allow for additional family to visit from out of town for now.      Goals of care  Comfort cares  Decision made to transition to comfort cares and discharge to hospice facility (hopeful for OLP) Still awaiting additional out of town family including her son and daughter so decision made to continue her tube feeds and insulin until they are able to visit and will be stopped prior to discharge. Anticipate that once these interventions are stopped that she will have days to a couple weeks. OLP denied on 5/30 due to patient not being bed bound however highly suspect this will be the case after nutrition stopped. Given barriers to alternative locations with insurance, plan to have OLP re-evaluate after TF stopped.   - Palliative care following and appreciate assistance   - Comfort cares (no vitals, labs, etc)  - Methadone 1 mg q12h  - Lorazepam solution 0.5 mg q4h PRN      Multifocal acute cerebral infarcts  Hx of multiple CVAs  Hx of seizures  Presented w/ new findings of dysarthria, dysphagia, decreased responsiveness,  confusion. CT head negative for acute infarct or hemorrhage. CTA w/ multifocal stenosis involving L RADHA and R MCA which had progressed since 1/16/25. Stroke Neuro team consulted while in the ED. Loaded with keppra. EEG negative. Found to have UTI so concern presentation was recrudescence of prior CVA deficits however MRI brain obtained showing acute infarcts in R corona radiata, precentral gyrus and operculum. Not a candidate for advanced therapies.   - Stopping DAPT, atorvastatin due to comfort cares focus in discussion with family  - Continue keppra 250 mg BID (plan to transition to scheduled ativan once NG removed)      Acute metabolic encephalopathy, multifactorial  Hx of vascular dementia  Acute change in status noted on arrival, possibly 2/2 acute CVA vs metabolic encephalopathy. Likely multifactorial with UTI, dehydration, hyperglycemia, hypercapnia, and hospital environment contributing, as well as newfound acute CVA as above. No significant electrolyte abnormalities. No suspicious meds. TSH 0.14. Some clinical improvement noted. Patient no longer somnolent or encephalopathic, although her cognitive function does not appear intact. Family reports patient is close to baseline.   - Delirium precautions  - Continue melatonin 1mg at bedtime -- stopping once NG removed     Dysphagia s/p NGT placement 5/10  Failed initial swallow study. Likely in the setting of acute encephalopathy and acute stroke. SLP consulted, recommended strict NPO. NG placed 5/10 and started on TF. In prior discussions, PEG not within GOC. SLP re-evaluated 5/22 with patient retaining food in mouth despite multiple cues to follow but when she does eventually there is no evidence of aspiration or dysphagia though at high risk for aspiration due to oral retention. Given this, it is unlikely she will be able to maintain adequate PO intake to prevent malnutrition.   - RD following for TF; at goal   - Plan to remove NG and stop TF after this  weekend  - Continue pureed diet with thin liquids and 1:1 supervision     DM1  Hypoglycemia  A1c of 8.8% in 3/2025. Had been NPO due to to dysphagia and now on TF. Labile blood sugars and hypoglycemia noted in setting of prior infection. PTA regimen includes lantus 18 units at bedtime. Updated Endocrine regarding plan to stop TF after family visits. Plan to stop NPH and do basal insulin only.   - Endocrine signed off  - Lantus 8 units daily at 1200 (this will remain her only coverage after TF stopped)  - NPH 13 units qAM + 7 units q PM    - Novolog carb coverage with 1 unit per 20 g CHO  - Medium intensity sliding scale q4h while on TF  - BG checks q4h, hypoglycemia protocol   - PRN D10 at 50 mL/hr if tube feeds are stopped or interrupted   - When ready to stop TF - stop NPH first and wait 12 hours prior to discontinuation of TF to prevent hypoglycemia and continue lantus only     Possible stress cardiomyopathy  Echo this admission with LVEF 50-55% with mid ventricular hypokinesis w/ preserved apical and mid segements c/w stress CM. No obvious signs of heart failure. Clinically, she has no s/sx to suggest acutely decompensated HF.  - Stopping carvedilol due to comfort cares     Hypothyroidism  Most recent TSH 0.99. Repeat TSH here 0.14. On admission, daughter noted recent medication adjustments (brand synthroid vs generic) therefore may have been over-replaced. Cannot r/o sick euthyroid state as well.  - Continue PTA PO synthroid 88mcg (plan to stop once NG removed)     CKD stage IIIb  Baseline Cr of 1.4-1.6. Cr improved to 1.3-1.4 with IVF. Suspect chronic dehydration contributing.     HTN  HLD  -180 on admission. Can allow for permissive hypertension in setting of acute stroke per Stroke Neuro.  - Stopping medications due to comfort cares     Chronic low back pain  - Diclofenac gel PRN  - Decreasing duloxetine from 20 mg daily to 10 mg daily with plan to stop prior to discharge   - Stopping gabapentin      Closed colles' fracture of L wrist  Occurred in April, saw Ortho 4/22/25, but not deemed a surgical candidate d/t her medical comorbidities. Per Ortho, they wanted her in brace and to be NWB status at L wrist. Repeat XR redemonstrated known comminuted intra-articular fracture of distal left radius. Ortho consulted with recommendations for hand therapy and progressing ROM as tolerate out of brace.   - Coffee cup weight bearing to L wrist.  - Hand therapy if able, ROM as tolerated out of brace     Left wrist rash  Initially felt to be pressure injury from wrist brace. Concern it developed vesicular appearance however VZV negative.   - WOCN consulted     Hypernatremia, resolved  Free water deficit in setting of dysphagia and NPO status. Resolved with hypotonic fluids.      UTI, recurrent, resolved  Urine culture from 5/1/25 grew pan-susceptible E.Coli. Afebrile on arrival, WBC normal. No other obvious source of infection. Repeat UA/UC negative. Completed 7 days of IV Ceftriaxone on 5/12.          Diet: Adult Formula Drip Feeding: Continuous [employer] Standard 1.4; Nasogastric tube; Goal Rate: 45 ( can start at goal ); mL/hr; Initiate at 10ml/hr and advance by 10ml Q8H as tolerated.; Do not advance tube feeding rate unless K+ is = or > 3.0, Mg++...  Pureed Diet (level 4) Thin Liquids (level 0)    DVT Prophylaxis: Comfort cares  Parker Catheter: Not present  Lines: None     Cardiac Monitoring: None  Code Status: No CPR- Do NOT Intubate      Clinically Significant Risk Factors               # Hypoalbuminemia: Lowest albumin = 2.9 g/dL at 5/17/2025  6:01 AM, will monitor as appropriate     # Hypertension: Noted on problem list     # Dementia: noted on problem list         # Severe Malnutrition: based on nutrition assessment and treatment provided per dietitian's recommendations.    # Financial/Environmental Concerns:           Social Drivers of Health    Food Insecurity: Unknown (5/17/2025)    Food Insecurity     Within  the past 12 months, did you worry that your food would run out before you got money to buy more?: Patient unable to answer     Within the past 12 months, did the food you bought just not last and you didn t have money to get more?: Patient unable to answer   Depression: At risk (4/2/2025)    PHQ-2     PHQ-2 Score: 4   Housing Stability: Unknown (5/17/2025)    Housing Stability     Do you have housing? : Patient unable to answer     Are you worried about losing your housing?: Patient unable to answer   Tobacco Use: Medium Risk (4/24/2025)    Patient History     Smoking Tobacco Use: Former     Smokeless Tobacco Use: Never   Financial Resource Strain: Unknown (5/17/2025)    Financial Resource Strain     Within the past 12 months, have you or your family members you live with been unable to get utilities (heat, electricity) when it was really needed?: Patient unable to answer   Transportation Needs: Unknown (5/17/2025)    Transportation Needs     Within the past 12 months, has lack of transportation kept you from medical appointments, getting your medicines, non-medical meetings or appointments, work, or from getting things that you need?: Patient unable to answer   Interpersonal Safety: Unknown (5/17/2025)    Interpersonal Safety     Do you feel physically and emotionally safe where you currently live?: Patient unable to answer     Within the past 12 months, have you been hit, slapped, kicked or otherwise physically hurt by someone?: Patient unable to answer     Within the past 12 months, have you been humiliated or emotionally abused in other ways by your partner or ex-partner?: Patient unable to answer          Disposition Plan     Medically Ready for Discharge: Ready Now           The patient's care was discussed with the Attending Physician, Dr. Mcdermott.    Sabina Moore PA-C  Hospitalist Service, GOLD TEAM [address] [place] [address]  Securely message with [employer] (more  info)  Text page via [facility] Paging/Directory   See signed in provider for up to date coverage information  ______________________________________________________________________    Interval History   No acute events overnight. Sleeping. Appears comfortable.     Physical Exam   Vital Signs:                    Weight: 132 lbs 7.94 oz    General Appearance: Sleeping. Appearing comfortable.   Respiratory: Normal work of breathing on room air, no signs of respiratory distress.   Skin: Warm, dry.     Medical Decision Making       35 MINUTES SPENT BY ME on the date of service doing chart review, history, exam, documentation & further activities per the note.      Data         Imaging results reviewed over the past 24 hrs:   No results found for this or any previous visit (from the past 24 hours).

## 2025-06-01 NOTE — PROGRESS NOTES
Pt was turned and repositioned every two hours. Pt incontinent bowel and bladder. She continue to be nonverbal. Will continue to monitor.

## 2025-06-01 NOTE — PLAN OF CARE
Goal Outcome Evaluation:      Plan of Care Reviewed With: patient    Overall Patient Progress: no changeOverall Patient Progress: no change       NG tube in place. TF running continuously at 45 ml/hr. Tubing changed at 1600.  PIVs SL. Dressings CDI. Old drainage.  Repo q 2-3 hrs. Pt follows commands to turn. A1 in bed.  Incontinent of B&B. Purewick in use, leaks small amount - padding changed.  PAINAD score 0. Pt did not reply when asked about pain.  No signs of nausea.  BG 66 (4 oz apple juice given post bg check), recheck 93 (2 oz apple juice given post bg recheck). Bedtime .    Family here to visit around 1900.

## 2025-06-01 NOTE — PLAN OF CARE
Goal Outcome Evaluation:      Plan of Care Reviewed With: patient    Overall Patient Progress: no changeOverall Patient Progress: no change         No acute changes. Nonverbal but able to follow basic commands. Incontinent. TF running at 45 mls/hr. Repositioned Q2-3. Continue POC

## 2025-06-01 NOTE — PLAN OF CARE
"COMFORT CARES    Assumed cares [id number]    No VS done- comfort cares. Alert, opens eyes spontaneously, able to follow commands, unable to assess orientation as pt rarely makes verbal responses, but will shake head yes & no appropriately. Bed alarm on for safety, repositioned per pt comfort.     NG w TF infusing goal @45mL/hr w Q4hr 140mL free water flushes. On Pureed diet w thin liquids, ask pt if she wanted food, said no & tried to bring food or drinks to pt mouth, but pt wouldn't open mouth or would shake head no. Incontinent of bowel & bladder, external cath in place, skin reddened but blanchable. X2 BM's, WOC done this AM & w incontinence episodes per orders.     X2 R arm PIV's SL. BG's done per orders, 110-228, covered w sliding scale Novolog per orders.     Pt resting comfortably between cares. Continue w plan of care & notify MD w concerns/questions.     Problem: Adult Inpatient Plan of Care  Goal: Plan of Care Review  Description: The Plan of Care Review/Shift note should be completed every shift.  The Outcome Evaluation is a brief statement about your assessment that the patient is improving, declining, or no change.  This information will be displayed automatically on your shiftnote.  The Plan of Care Review/Shift note should be completed every shift.  The Outcome Evaluation is a brief statement about your assessment that the patient is improving, declining, or no change.  This information will be displayed automatically on your shiftnote.  Outcome: Progressing  Goal: Patient-Specific Goal (Individualized)  Description: You can add care plan individualizations to a care plan. Examples of Individualization might be:  \"Parent requests to be called daily at 9am for status\", \"I have a hard time hearing out of my right ear\", or \"Do not touch me to wake me up as it startlesme\".  Outcome: Progressing  Goal: Absence of Hospital-Acquired Illness or Injury  Outcome: Progressing  Intervention: Identify and Manage " Fall Risk  Recent Flowsheet Documentation  Taken 6/1/2025 0800 by Donovan Menon RN  Safety Promotion/Fall Prevention:   activity supervised   assistive device/personal items within reach   clutter free environment maintained   increased rounding and observation   nonskid shoes/slippers when out of bed   patient and family education   room near nurse's station   room organization consistent   safety round/check completed  Intervention: Prevent Skin Injury  Recent Flowsheet Documentation  Taken 6/1/2025 1400 by Donovan Menon RN  Body Position: turned  Taken 6/1/2025 0800 by Donovan Menon RN  Skin Protection:   adhesive use limited   transparent dressing maintained  Intervention: Prevent Infection  Recent Flowsheet Documentation  Taken 6/1/2025 0800 by Donovan Menon RN  Infection Prevention:   environmental surveillance performed   equipment surfaces disinfected   hand hygiene promoted   rest/sleep promoted   single patient room provided  Goal: Optimal Comfort and Wellbeing  Outcome: Progressing  Goal: Readiness for Transition of Care  Outcome: Progressing     Problem: Delirium  Goal: Optimal Coping  Outcome: Progressing  Goal: Improved Behavioral Control  Outcome: Progressing  Intervention: Minimize Safety Risk  Recent Flowsheet Documentation  Taken 6/1/2025 0800 by Donovan Menon RN  Enhanced Safety Measures: room near unit station  Goal: Improved Attention and Thought Clarity  Outcome: Progressing  Goal: Improved Sleep  Outcome: Progressing     Problem: Diabetes  Goal: Optimal Coping  Outcome: Progressing  Goal: Optimal Functional Ability  Outcome: Progressing  Intervention: Optimize Functional Ability  Recent Flowsheet Documentation  Taken 6/1/2025 0800 by Donovan Menon RN  Assistive Device Utilized: lift device  Activity Management: activity adjusted per tolerance  Activity Assistance Provided: assistance, 2 people  Goal: Blood Glucose Level Within Target  Range  Outcome: Progressing  Intervention: Optimize Glycemic Control  Recent Flowsheet Documentation  Taken 6/1/2025 0800 by Donovan Menon RN  Hyperglycemia Management: blood glucose monitored  Goal: Minimize Risk of Hypoglycemia  Outcome: Progressing  Intervention: Management and Risk Reduction of Hypoglycemia  Recent Flowsheet Documentation  Taken 6/1/2025 0800 by Donovan Menon RN  Hypoglycemia Management: blood glucose monitored     Problem: Comorbidity Management  Goal: Blood Glucose Levels Within Targeted Range  Outcome: Progressing  Intervention: Monitor and Manage Glycemia  Recent Flowsheet Documentation  Taken 6/1/2025 0800 by Donovan Menon RN  Medication Review/Management: medications reviewed  Goal: Blood Pressure in Desired Range  Outcome: Progressing  Intervention: Maintain Blood Pressure Management  Recent Flowsheet Documentation  Taken 6/1/2025 0800 by Donovan Menon RN  Medication Review/Management: medications reviewed  Goal: Maintenance of Seizure Control  Outcome: Progressing  Intervention: Maintain Seizure Symptom Control  Recent Flowsheet Documentation  Taken 6/1/2025 0800 by Donovan Menon RN  Seizure Precautions: activity supervised  Medication Review/Management: medications reviewed   Goal Outcome Evaluation:

## 2025-06-02 LAB
GLUCOSE BLDC GLUCOMTR-MCNC: 118 MG/DL (ref 70–99)
GLUCOSE BLDC GLUCOMTR-MCNC: 138 MG/DL (ref 70–99)
GLUCOSE BLDC GLUCOMTR-MCNC: 143 MG/DL (ref 70–99)
GLUCOSE BLDC GLUCOMTR-MCNC: 165 MG/DL (ref 70–99)
GLUCOSE BLDC GLUCOMTR-MCNC: 177 MG/DL (ref 70–99)
GLUCOSE BLDC GLUCOMTR-MCNC: 198 MG/DL (ref 70–99)
GLUCOSE BLDC GLUCOMTR-MCNC: 203 MG/DL (ref 70–99)

## 2025-06-02 PROCEDURE — 250N000009 HC RX 250: Performed by: INTERNAL MEDICINE

## 2025-06-02 PROCEDURE — 250N000013 HC RX MED GY IP 250 OP 250 PS 637

## 2025-06-02 PROCEDURE — 250N000013 HC RX MED GY IP 250 OP 250 PS 637: Performed by: INTERNAL MEDICINE

## 2025-06-02 PROCEDURE — 99233 SBSQ HOSP IP/OBS HIGH 50: CPT | Mod: FS | Performed by: STUDENT IN AN ORGANIZED HEALTH CARE EDUCATION/TRAINING PROGRAM

## 2025-06-02 PROCEDURE — 250N000013 HC RX MED GY IP 250 OP 250 PS 637: Performed by: STUDENT IN AN ORGANIZED HEALTH CARE EDUCATION/TRAINING PROGRAM

## 2025-06-02 PROCEDURE — 99418 PROLNG IP/OBS E/M EA 15 MIN: CPT | Mod: FS | Performed by: STUDENT IN AN ORGANIZED HEALTH CARE EDUCATION/TRAINING PROGRAM

## 2025-06-02 PROCEDURE — 99233 SBSQ HOSP IP/OBS HIGH 50: CPT | Performed by: INTERNAL MEDICINE

## 2025-06-02 PROCEDURE — 120N000002 HC R&B MED SURG/OB UMMC

## 2025-06-02 PROCEDURE — 250N000013 HC RX MED GY IP 250 OP 250 PS 637: Performed by: PHYSICIAN ASSISTANT

## 2025-06-02 PROCEDURE — 99207 PR APP CREDIT; MD BILLING SHARED VISIT: CPT | Mod: FS | Performed by: PHYSICIAN ASSISTANT

## 2025-06-02 RX ADMIN — LEVETIRACETAM 250 MG: 100 SOLUTION ORAL at 20:13

## 2025-06-02 RX ADMIN — INSULIN ASPART 1 UNITS: 100 INJECTION, SOLUTION INTRAVENOUS; SUBCUTANEOUS at 01:29

## 2025-06-02 RX ADMIN — Medication 10 MG: at 08:47

## 2025-06-02 RX ADMIN — INSULIN ASPART 1 UNITS: 100 INJECTION, SOLUTION INTRAVENOUS; SUBCUTANEOUS at 05:49

## 2025-06-02 RX ADMIN — MICONAZOLE NITRATE: 20 CREAM TOPICAL at 08:47

## 2025-06-02 RX ADMIN — DICLOFENAC SODIUM 2 G: 10 GEL TOPICAL at 11:44

## 2025-06-02 RX ADMIN — INSULIN ASPART 2 UNITS: 100 INJECTION, SOLUTION INTRAVENOUS; SUBCUTANEOUS at 14:40

## 2025-06-02 RX ADMIN — Medication 1 MG: at 20:13

## 2025-06-02 RX ADMIN — INSULIN ASPART 1 UNITS: 100 INJECTION, SOLUTION INTRAVENOUS; SUBCUTANEOUS at 09:35

## 2025-06-02 RX ADMIN — DICLOFENAC SODIUM 2 G: 10 GEL TOPICAL at 20:16

## 2025-06-02 RX ADMIN — DICLOFENAC SODIUM 2 G: 10 GEL TOPICAL at 08:47

## 2025-06-02 RX ADMIN — LEVETIRACETAM 250 MG: 100 SOLUTION ORAL at 08:47

## 2025-06-02 RX ADMIN — METHADONE HYDROCHLORIDE 1 MG: 10 CONCENTRATE ORAL at 09:35

## 2025-06-02 RX ADMIN — OXYCODONE HYDROCHLORIDE 2.5 MG: 100 SOLUTION ORAL at 20:13

## 2025-06-02 RX ADMIN — LEVOTHYROXINE SODIUM 88 MCG: 88 TABLET ORAL at 08:47

## 2025-06-02 RX ADMIN — MICONAZOLE NITRATE: 20 CREAM TOPICAL at 20:16

## 2025-06-02 ASSESSMENT — ACTIVITIES OF DAILY LIVING (ADL)
ADLS_ACUITY_SCORE: 95

## 2025-06-02 NOTE — PROGRESS NOTES
[facility]  Palliative Care Daily Progress Note       Recommendations & Counseling     GOALS OF CARE:     There has been a  change in the plan today after talking more with zoila Deleon. Updated plan:  Work toward discharge to facility with hospice CLOS[facility] to [facility] she she and more family and friends can visit with Isabell before she dies.   Because they hope to have some awake time for visits with loved ones, zoila Lerma would like to continue tube feeding for now until we have a discharge plan and then stop tube feeding closer to time of discharge.   Once plan is in place to stop tube feeds, will still need to continue tube feeds for 12 hours after last dose of NPH to avoid hypoglycemia and so will need to take this into account when timing rides for discharge planning.     Should comleted POLST form prior to discharge. Did not do this today.        ADVANCE CARE PLANNING:  No health care directive on file. Per system policy, Surrogate Decision-makers for Patients With Diminished Decision-making Capacity offers guidance on possible decision-makers. Zoila Ramey have been identified as a surrogate decision makers.   There is no POLST form on file, should complete prior to discharge   Code status: No CPR- Do NOT Intubate     MEDICAL MANAGEMENT:     Comfort care plan:    comfort care order placed  stopped most of her non comfort medications    While using NG tube:  continue keppra, duloxetine, melatonin and levothyroxine.     Once NG tube is removed:   stop melatonin and levothyroxine  Could consider trial of duloxetine oral solution at reduced dose 5mg (this would only be 0.5mL) given family's concern for withdrawal side effects x 1 week and then stop.   Consider trial of giving keppra oral solution but if she does not tolerate or does not reliably swallow then would stop the keppra  and start concentrated sublingual lorazepam 0.5 mg BID to  prevent seizures given seizure hx,  plus prn lorazepam    For diabetes management with hx DKA:  Of note:  Given patient is type 1 diabetic and at risk for rapidly developing DKA it would be reasonable and common practice to continue basal insulin to prevent this and also to liberalize glucose goals to <360 to prevent hypoglycemia from over correction. With minimal oral intake kofi be reasonable to limit glucose checks to PRN if symptoms develop, with sliding scale insulin for correction.    Continue current insulin while on tube feeding  When tube feeding stops, anticipate she will not be taking in meaningful calories/sugar and so would stop NPH and continue glargine to prevent rapid DKA. See diabetes management note for details.   Once plan is in place to stop tube feeds, continue tube feeds for 12 hours after last dose of NPH to avoid hypoglycemia  If family decides she would not want any life prolonging treatment including insulin, then all insulin including glargine could also be stopped with plan to treat any DKA symptoms with comfort medications. Typically this is done in last days of life.     For pain: (chronic MSK pain)  sublingual methadone 1 mg BID for pain management given patient not able to reliably tell us when she wants a prn   Oxycodone 2.5-5 mg q 2 hours prn    For mood:  Weaning duloxetine (decreased to 10 mg) with plan to stop vs wean further once NG tube stopped  prn lorazepam for anxiety  prn haldol for agitation     PSYCHOSOCIAL/SPIRITUAL SUPPORT:  Family - daughters Vishal and Felton (former is local, latter is in [place])  Am[employer] - Religion        Discussed case with  unit SW and messaged with medicine team.      Mayra Guerra MD / Palliative Medicine   Securely message with the Vocera Web Console (learn more here)   Text page via [email or web address] Paging/Directory           Assessments          Isabell Matthews is a 64 year old female with a past medical history of HTN, HLD, CKD IIIb, type 1 DM c/b  by neuropathy, hx of ischemic and hemorrhagic stroke (ischemic R basal ganglia in 2018, hemorrhagic L basal ganglia in 2021, R centrum semiovale and R chuck in 2023), with residual facial droop (had right sided and left sided facial droop in past), vascular dementia, seizure disorder, hx of falls, and recurrent UTI.      She initially presented on 5/7/2025 with acute onset speech changes (soft voice), confusion, worsening of baseline cognition, and worse po intake. MRI brain showed multiple small acute infarcts in R frontal lobe near encephalomalacia from prior stroke and could likely have caused worsening of of her baseline symptoms. Stroke thought to be from ICAD.      Neuro signed off 5/10 after stroke workup but was then asked to be reinvolved 5/15 due to persistent somnolence and concern Keppra was contributing to this. Did not recommend new imaging, thought Keppra is unlikely to be contributing to AMS, and exam is similar to prior one. Made recs for ASA, Keppra, and Plavix (which was held before due to possible need for PEG).     Palliative was consulted on 5/15 for goals of care.     Today, the patient was seen for:  Hx of multiple strokes  Goals of care  Support  Palliative encounter            Interval History:     No acute events  Son was able to visit last night    Patient engages minimally - she was sleeping at time of my visit today               Medications:     I have reviewed this patient's medication profile and medications during this hospitalization.           Physical Exam:   Vitals were reviewed                     General: Not in acute distress.    Unlabored regular rate of breathing  NGT in place  sleeping           Data Reviewed:       CMP  Recent Labs   Lab 06/02/25  0935 06/02/25  0541 06/02/25  0127 06/01/25  2116 05/27/25  0827 05/27/25  0516   NA  --   --   --   --   --  139   POTASSIUM  --   --   --   --   --  4.6   CHLORIDE  --   --   --   --   --  104   CO2  --   --   --   --   --  26    ANIONGAP  --   --   --   --   --  9   * 165* 143* 143*   < > 213*   BUN  --   --   --   --   --  28.2*   CR  --   --   --   --   --  1.45*   GFRESTIMATED  --   --   --   --   --  40*   VERONICA  --   --   --   --   --  9.2   PROTTOTAL  --   --   --   --   --  6.1*   ALBUMIN  --   --   --   --   --  3.3*   BILITOTAL  --   --   --   --   --  0.2   ALKPHOS  --   --   --   --   --  232*   AST  --   --   --   --   --  43   ALT  --   --   --   --   --  32    < > = values in this interval not displayed.     CBC  Recent Labs   Lab 05/27/25  0516   WBC 7.4   RBC 3.81   HGB 10.3*   HCT 33.6*   MCV 88   MCH 27.0   MCHC 30.7*   RDW 16.2*        INRNo lab results found in last 7 days.  Arterial Blood Gas  No lab results found in last 7 days.    Medical Decision Making       MANAGEMENT DISCUSSED with the following over the past 24 hours: unit SW and medicine team   NOTE(S)/MEDICAL RECORDS REVIEWED over the past 24 hours: medicine, SW, nursing, MAR review  55 MINUTES SPENT BY ME on the date of service doing chart review, history, exam, documentation & further activities per the note.

## 2025-06-02 NOTE — PROGRESS NOTES
[facility]    Medicine Progress Note - Hospitalist Service, GOLD TEAM 6    Date of Admission:  5/7/2025    Updates today:  - d/w family plan is for hospice near home  - appreciate pall care input details per their note and below  - trial oral keppra to see if tolerates  - NG/ TF and glucose ctrl until family visits - continue at this time  - appreciate endo following     Assessment & Plan   Isabell Matthews is a 64F w/ PMH T1DM c/b diabetic neuropathy, orthostatic hypotension, seizures, vascular dementia, prior CVAs, hypothyroidism, recurrent UTIs, and frequent falls  admitted on 5/7/2025.   She initially presented to the ED for evaluation of stroke-like symptoms. MRI completed showing acute infarcts for which she was not a candidate for advanced therapies. Ongoing admission due to ongoing goals of care discussion between providers and family for disposition. Care conference on 5/29 with decision to transition Isabell to comfort cares and placing referrals for hospice with preference for [facility]. Leaving NG access and TF as well as insulin coverage in the interim to allow for additional family to visit from out of town for now.      Goals of care  Comfort cares  Decision made to transition to comfort cares and discharge to hospice facility (hopeful for [facility]) Still awaiting additional out of town family including her son and daughter so decision made to continue her tube feeds and insulin until they are able to visit and will be stopped prior to discharge. Anticipate that once these interventions are stopped that she will have days to a couple weeks. OLP denied on 5/30 due to patient not being bed bound however highly suspect this will be the case after nutrition stopped. Given barriers to alternative locations with insurance, plan to have O[facility] re-evaluate after TF stopped.   - continue TF/NG/ insulin NPH at this time  - family with concern for medication withdrawal side effects will  work on weaning   - Palliative care following and appreciate assistance   - Comfort cares (no vitals, labs, etc)  - Methadone 1 mg q12h  - Lorazepam solution 0.5 mg q4h PRN      Multifocal acute cerebral infarcts  Hx of multiple CVAs  Hx of seizures  Presented w/ new findings of dysarthria, dysphagia, decreased responsiveness, confusion. CT head negative for acute infarct or hemorrhage. CTA w/ multifocal stenosis involving L RADHA and R MCA which had progressed since 1/16/25. Stroke Neuro team consulted while in the ED. Loaded with keppra. EEG negative. Found to have UTI so concern presentation was recrudescence of prior CVA deficits however MRI brain obtained showing acute infarcts in R corona radiata, precentral gyrus and operculum. Not a candidate for advanced therapies.   - Stopping DAPT, atorvastatin due to comfort cares focus in discussion with family  - Continue keppra 250 mg BID (plan to transition to scheduled ativan once NG removed)      Acute metabolic encephalopathy, multifactorial  Hx of vascular dementia  Acute change in status noted on arrival, possibly 2/2 acute CVA vs metabolic encephalopathy. Likely multifactorial with UTI, dehydration, hyperglycemia, hypercapnia, and hospital environment contributing, as well as newfound acute CVA as above. No significant electrolyte abnormalities. No suspicious meds. TSH 0.14. Some clinical improvement noted. Patient no longer somnolent or encephalopathic, although her cognitive function does not appear intact. Family reports patient is close to baseline.   - Delirium precautions  - Continue melatonin 1mg at bedtime -- stopping once NG removed     Dysphagia s/p NGT placement 5/10  Failed initial swallow study. Likely in the setting of acute encephalopathy and acute stroke. SLP consulted, recommended strict NPO. NG placed 5/10 and started on TF. In prior discussions, PEG not within GOC. SLP re-evaluated 5/22 with patient retaining food in mouth despite multiple cues  to follow but when she does eventually there is no evidence of aspiration or dysphagia though at high risk for aspiration due to oral retention. Given this, it is unlikely she will be able to maintain adequate PO intake to prevent malnutrition.   - RD following for TF; at goal   - Plan to remove NG and stop TF after this weekend  - Continue pureed diet with thin liquids and 1:1 supervision     DM1  Hypoglycemia  A1c of 8.8% in 3/2025. Had been NPO due to to dysphagia and now on TF. Labile blood sugars and hypoglycemia noted in setting of prior infection. PTA regimen includes lantus 18 units at bedtime. Updated Endocrine regarding plan to stop TF after family visits. Plan to stop NPH and do basal insulin only.   - Endocrine signed off  - Lantus 8 units daily at 1200 (this will remain her only coverage after TF stopped)  - NPH 13 units qAM + 7 units q PM    - Novolog carb coverage with 1 unit per 20 g CHO  - Medium intensity sliding scale q4h while on TF  - BG checks q4h, hypoglycemia protocol   - PRN D10 at 50 mL/hr if tube feeds are stopped or interrupted   - When ready to stop TF - stop NPH first and wait 12 hours prior to discontinuation of TF to prevent hypoglycemia and continue lantus only     Possible stress cardiomyopathy  Echo this admission with LVEF 50-55% with mid ventricular hypokinesis w/ preserved apical and mid segements c/w stress CM. No obvious signs of heart failure. Clinically, she has no s/sx to suggest acutely decompensated HF.  - Stopping carvedilol due to comfort cares     Hypothyroidism  Most recent TSH 0.99. Repeat TSH here 0.14. On admission, daughter noted recent medication adjustments (brand synthroid vs generic) therefore may have been over-replaced. Cannot r/o sick euthyroid state as well.  - Continue PTA PO synthroid 88mcg (plan to stop once NG removed)     CKD stage IIIb  Baseline Cr of 1.4-1.6. Cr improved to 1.3-1.4 with IVF. Suspect chronic dehydration contributing.      HTN  HLD  -180 on admission. Can allow for permissive hypertension in setting of acute stroke per Stroke Neuro.  - Stopping medications due to comfort cares     Chronic low back pain  - Diclofenac gel PRN  - Decreasing duloxetine from 20 mg daily to 10 mg daily with plan to stop prior to discharge   - Stopping gabapentin     Closed colles' fracture of L wrist  Occurred in April, saw Ortho 4/22/25, but not deemed a surgical candidate d/t her medical comorbidities. Per Ortho, they wanted her in brace and to be NWB status at L wrist. Repeat XR redemonstrated known comminuted intra-articular fracture of distal left radius. Ortho consulted with recommendations for hand therapy and progressing ROM as tolerate out of brace.   - Coffee cup weight bearing to L wrist.  - Hand therapy if able, ROM as tolerated out of brace     Left wrist rash  Initially felt to be pressure injury from wrist brace. Concern it developed vesicular appearance however VZV negative.   - WOCN consulted     Hypernatremia, resolved  Free water deficit in setting of dysphagia and NPO status. Resolved with hypotonic fluids.      UTI, recurrent, resolved  Urine culture from 5/1/25 grew pan-susceptible E.Coli. Afebrile on arrival, WBC normal. No other obvious source of infection. Repeat UA/UC negative. Completed 7 days of IV Ceftriaxone on 5/12.          Diet: Adult Formula Drip Feeding: Continuous [place] Standard 1.4; Nasogastric tube; Goal Rate: 45 ( can start at goal ); mL/hr; Initiate at 10ml/hr and advance by 10ml Q8H as tolerated.; Do not advance tube feeding rate unless K+ is = or > 3.0, Mg++...  Pureed Diet (level 4) Thin Liquids (level 0)    DVT Prophylaxis: Pneumatic Compression Devices  Parker Catheter: Not present  Lines: None     Cardiac Monitoring: None  Code Status: No CPR- Do NOT Intubate      Clinically Significant Risk Factors               # Hypoalbuminemia: Lowest albumin = 2.9 g/dL at 5/17/2025  6:01 AM, will monitor as  appropriate     # Hypertension: Noted on problem list     # Dementia: noted on problem list         # Severe Malnutrition: based on nutrition assessment and treatment provided per dietitian's recommendations.    # Financial/Environmental Concerns:           Social Drivers of Health    Food Insecurity: Unknown (5/17/2025)    Food Insecurity     Within the past 12 months, did you worry that your food would run out before you got money to buy more?: Patient unable to answer     Within the past 12 months, did the food you bought just not last and you didn t have money to get more?: Patient unable to answer   Depression: At risk (4/2/2025)    PHQ-2     PHQ-2 Score: 4   Housing Stability: Unknown (5/17/2025)    Housing Stability     Do you have housing? : Patient unable to answer     Are you worried about losing your housing?: Patient unable to answer   Tobacco Use: Medium Risk (4/24/2025)    Patient History     Smoking Tobacco Use: Former     Smokeless Tobacco Use: Never   Financial Resource Strain: Unknown (5/17/2025)    Financial Resource Strain     Within the past 12 months, have you or your family members you live with been unable to get utilities (heat, electricity) when it was really needed?: Patient unable to answer   Transportation Needs: Unknown (5/17/2025)    Transportation Needs     Within the past 12 months, has lack of transportation kept you from medical appointments, getting your medicines, non-medical meetings or appointments, work, or from getting things that you need?: Patient unable to answer   Interpersonal Safety: Unknown (5/17/2025)    Interpersonal Safety     Do you feel physically and emotionally safe where you currently live?: Patient unable to answer     Within the past 12 months, have you been hit, slapped, kicked or otherwise physically hurt by someone?: Patient unable to answer     Within the past 12 months, have you been humiliated or emotionally abused in other ways by your partner or  ex-partner?: Patient unable to answer          Disposition Plan     Medically Ready for Discharge: Ready once hospice identified           The patient's care was discussed with the Attending Physician, Dr. Mcdermott, Bedside Nurse, Care Coordinator/, Patient, Patient's Family, and pall care Team.    Deepali Mcgovern PA-C  Hospitalist Service, GOLD TEAM [address] [place] [address]  Securely message with [employer] (more info)  Text page via [employer] Paging/Directory   See signed in provider for up to date coverage information  ______________________________________________________________________    Interval History   Isabell is not responsive an unable to give a history.     Per d/w her daughter and the hope is she will go to hospice and initially the plan [facility].  Per d/w Pall Care after they spoke to Vishal, the plan is now to try for hospice close to [place] (insurance only covers [place]) so that patient can be close to family in [place] so they may visit with her at hospice.  Family reportedly also very much wants her to avoid side effects of withdrawal and are hoping medications, such as her psychiatric medications are tapered carefully. Family continues to express compassionate concerns for Isabell.       Physical Exam   Vital Signs:                    Weight: 134 lbs 11.22 oz  GENERAL: appears comfortable. Resting supine. NAD.   HEENT: ANC. AT.   CV: RRR. S1, S2. No murmurs appreciated.   RESPIRATORY: Effort normal on RA.   SKIN: No jaundice. No rashes on exposed skin      Medical Decision Making       50 MINUTES SPENT BY ME on the date of service doing chart review, history, exam, documentation & further activities per the note.      Data         Imaging results reviewed over the past 24 hrs:   No results found for this or any previous visit (from the past 24 hours).

## 2025-06-02 NOTE — PROGRESS NOTES
Care Management Follow Up    Length of Stay (days): 26  Expected Discharge Date: 06/04/2025  Concerns to be Addressed: discharge planning     Patient plan of care discussed at interdisciplinary rounds: Yes  Anticipated Discharge Disposition: Hospice at a Facility  Anticipated Discharge Services: None  Anticipated Discharge DME: None  Patient/family educated on Medicare website which has current facility and service quality ratings: yes  Education Provided on the Discharge Plan: Yes  Patient/Family in Agreement with the Plan: yes  Referrals Placed by CM/SW: Hospice  Private pay costs discussed: Not applicable    Discussed  Partnership in Safe Discharge Planning  document with patient/family: Yes     Handoff Completed: No, handoff not indicated or clinically appropriate    Additional Information:  Handoff to this writer was plan for TF and insulin to be discontinued today after pt's family had a chance to visit over the weekend and then plan to resubmit referral to [facility] mid week once pt is more appropriate for Residential Hospice setting with TF discontinued.  KINZA discussed this plan with Dr. Mayra Guerra in Palliative and with [facility] Provider, HEBERT Mcgovern, respectively.      @12:26- KINZA received update from Dr. Guerra to call pt's daughter and HCA, Vishal, as family would now like to pursue hospice at a LTC closer to [place], maybe in [facility].      KINZA called and spoke with Vishal and confirmed that she would like to search for LTC with hospice in or near [place] (anywhere in [place] within an hour of [place]) to facilitate family being able to spend time with pt while still being at a facility that would be covered by pt's [place] insurance.  KINZA reviewed Medicare.gov website with Vishal and identified 3 facilities in [place] that would be within that distance ([place], [place], and [place]).  KINZA will place these referrals and get back to [facility] by Wednesday with an update.  If  none of these facilities are an option, Vishal is interested in a LTC facility on the outskirts of the [facility] that could facilitate family from [place] visit, likely in the [place] area, and then perhaps revisiting the idea of [facility] if need be.      KINZA confirmed with Vishal that pt is on an Elderly Waiver that should cover LTC.     Elderly Waiver  :  Karla Gordillo Ph: [phone], email: [email or web address]    Next Steps:   - KINZA will follow up with ANJELICA WOOD tomorrow morning to confirm LTC coverage.  - KINZA will place LTC and hospice referrals, updating team and family.   __________________________     ROBIN Sinclair, ANNE MARIE  , [facility]  [address] 6622-4824   Desk Phone: [phone]   Securely message with [employer] (Search Name or 7[address] Med Surg [id number] )  Text page via [facility] Paging/Directory   See signed in provider for up to date coverage information  Social Work & Care Management Department

## 2025-06-02 NOTE — PLAN OF CARE
Goal Outcome Evaluation:      Plan of Care Reviewed With: patient    Overall Patient Progress: no changeOverall Patient Progress: no change    Outcome Evaluation: Continue POC    No acute changes. Incontinent bladder. TF infusing at goal. Nonverbal but able to follow commands. Continue POC

## 2025-06-02 NOTE — PLAN OF CARE
Goal Outcome Evaluation:    Pt resting in bed, no c.o pain, no nonverbal indicators of pain, tubefeed and insulin continue per orders until discharge plan in place along with BG checks, pt is mostly nonverbal, does answer yes or no by pointing or nodding but unclear if she understands the difference between yes and no, incontinent of bowel and bladder, small stool and using purewick but leaks, repositions q 2 hours with help from patient, bed alarm on although no attempts OOB

## 2025-06-03 LAB
GLUCOSE BLDC GLUCOMTR-MCNC: 109 MG/DL (ref 70–99)
GLUCOSE BLDC GLUCOMTR-MCNC: 137 MG/DL (ref 70–99)
GLUCOSE BLDC GLUCOMTR-MCNC: 165 MG/DL (ref 70–99)
GLUCOSE BLDC GLUCOMTR-MCNC: 175 MG/DL (ref 70–99)
GLUCOSE BLDC GLUCOMTR-MCNC: 177 MG/DL (ref 70–99)
GLUCOSE BLDC GLUCOMTR-MCNC: 205 MG/DL (ref 70–99)

## 2025-06-03 PROCEDURE — 250N000013 HC RX MED GY IP 250 OP 250 PS 637: Performed by: PHYSICIAN ASSISTANT

## 2025-06-03 PROCEDURE — 250N000012 HC RX MED GY IP 250 OP 636 PS 637: Performed by: PHYSICIAN ASSISTANT

## 2025-06-03 PROCEDURE — 99232 SBSQ HOSP IP/OBS MODERATE 35: CPT | Performed by: PHYSICIAN ASSISTANT

## 2025-06-03 PROCEDURE — 250N000013 HC RX MED GY IP 250 OP 250 PS 637: Performed by: STUDENT IN AN ORGANIZED HEALTH CARE EDUCATION/TRAINING PROGRAM

## 2025-06-03 PROCEDURE — 250N000009 HC RX 250: Performed by: INTERNAL MEDICINE

## 2025-06-03 PROCEDURE — 120N000002 HC R&B MED SURG/OB UMMC

## 2025-06-03 PROCEDURE — 250N000013 HC RX MED GY IP 250 OP 250 PS 637

## 2025-06-03 PROCEDURE — 250N000013 HC RX MED GY IP 250 OP 250 PS 637: Performed by: INTERNAL MEDICINE

## 2025-06-03 RX ADMIN — DICLOFENAC SODIUM 2 G: 10 GEL TOPICAL at 08:00

## 2025-06-03 RX ADMIN — LEVETIRACETAM 250 MG: 100 SOLUTION ORAL at 22:11

## 2025-06-03 RX ADMIN — Medication 1 MG: at 22:23

## 2025-06-03 RX ADMIN — INSULIN ASPART 1 UNITS: 100 INJECTION, SOLUTION INTRAVENOUS; SUBCUTANEOUS at 09:28

## 2025-06-03 RX ADMIN — METHADONE HYDROCHLORIDE 1 MG: 10 CONCENTRATE ORAL at 00:00

## 2025-06-03 RX ADMIN — MICONAZOLE NITRATE: 20 CREAM TOPICAL at 08:00

## 2025-06-03 RX ADMIN — METHADONE HYDROCHLORIDE 1 MG: 10 CONCENTRATE ORAL at 22:11

## 2025-06-03 RX ADMIN — INSULIN ASPART 2 UNITS: 100 INJECTION, SOLUTION INTRAVENOUS; SUBCUTANEOUS at 06:06

## 2025-06-03 RX ADMIN — INSULIN HUMAN 13 UNITS: 100 INJECTION, SUSPENSION SUBCUTANEOUS at 09:28

## 2025-06-03 RX ADMIN — OXYCODONE HYDROCHLORIDE 2.5 MG: 100 SOLUTION ORAL at 22:10

## 2025-06-03 RX ADMIN — LORAZEPAM 0.5 MG: 2 CONCENTRATE ORAL at 02:29

## 2025-06-03 RX ADMIN — DICLOFENAC SODIUM 2 G: 10 GEL TOPICAL at 22:22

## 2025-06-03 RX ADMIN — Medication 10 MG: at 08:00

## 2025-06-03 RX ADMIN — INSULIN ASPART 1 UNITS: 100 INJECTION, SOLUTION INTRAVENOUS; SUBCUTANEOUS at 00:03

## 2025-06-03 RX ADMIN — METHADONE HYDROCHLORIDE 1 MG: 10 CONCENTRATE ORAL at 09:29

## 2025-06-03 RX ADMIN — LEVOTHYROXINE SODIUM 88 MCG: 88 TABLET ORAL at 08:00

## 2025-06-03 RX ADMIN — INSULIN ASPART 1 UNITS: 100 INJECTION, SOLUTION INTRAVENOUS; SUBCUTANEOUS at 22:20

## 2025-06-03 RX ADMIN — LEVETIRACETAM 250 MG: 100 SOLUTION ORAL at 08:00

## 2025-06-03 RX ADMIN — DICLOFENAC SODIUM 2 G: 10 GEL TOPICAL at 11:54

## 2025-06-03 RX ADMIN — MICONAZOLE NITRATE: 20 CREAM TOPICAL at 22:23

## 2025-06-03 ASSESSMENT — ACTIVITIES OF DAILY LIVING (ADL)
ADLS_ACUITY_SCORE: 95

## 2025-06-03 NOTE — PLAN OF CARE
Shift Hours: 1900 - 0700     Assessment:  Body systems that were not at patient's baseline Cognitive/Behavioral/Neuro. Focused body system assessments documented in flowsheets.        Activity              Fall Risk Score: 110              Bed alarm on? Yes     Activity Assistance Provided: assistance, 1 person      Assistive Device Utilized: walker, gait belt    Pain: FOZIA - pt reported no pain and visually expressed no pain.    Labs/RN Managed Protocols: q4 blood sugars     Lines/Drains: x2 R PIV    Nutrition: TF, pureed diet, thin liquids      Goal Outcome Evaluation        Pt showed no expression of pain during shift. Pt showed signs of agitation (wide eyes, attempting to lave bed, touching NG tube) - ativan given with good effect (pt was able to fall asleep after). Pt repositioned q2 hrs. TF running without complication. No acute changes or significant events during shift.         Barriers to Discharge:   Waiting for placement that will be closer to family.

## 2025-06-03 NOTE — PROGRESS NOTES
Care Management Follow Up    Length of Stay (days): 27    Expected Discharge Date: 06/04/2025     Concerns to be Addressed: discharge planning     Patient plan of care discussed at interdisciplinary rounds: Yes    Anticipated Discharge Disposition: LTC w/ Hospice near family in [place] vs. LTC w/ Hospice [place] the [place] outskirts [place] the  vs. [facility]     Anticipated Discharge Services: Hospice  Anticipated Discharge DME: None    Patient/family educated on Medicare website which has current facility and service quality ratings: yes  Education Provided on the Discharge Plan: Yes  Patient/Family in Agreement with the Plan: yes    Referrals Placed by CM/SW: Hospice  Private pay costs discussed: Not applicable    Discussed  Partnership in Safe Discharge Planning  document with patient/family: Yes:   Handoff Completed: No, handoff not indicated or clinically appropriate    Additional Information:   requested that URBANO Bonds I place LTC referrals at the 3 requested facilities discussed with pt's daughter, Vishal, ([facility] in [place], [facility] in [place], and [facility] in [place]) with the goal of getting pt as close to family as possible in [place], [place], while still remaining in [place].      KINZA called pt's Elderly Waiver Karla WOOD, and left a VM regarding LTC coverage under pt's Elderly Waiver.   SW received VM back from Karla who confirmed that patient's Elderly Waiver does not cover LTC, but that [place] insurance does cover LTC.    Next Steps:   - Care Management team will continue to follow for safe discharge.  - F/up with LTC facilities in [facility] for LTC with hospice placement.     Discharge Resources:     Elderly Waiver  :  Karla Godrillo Ph: [phone], email: [email or web address]   __________________________     ROBIN Sinclair, TIERNEY  , [facility]  [address] 7618-3701    Desk Phone: [phone]   Securely message with [employer] (Search Name or [address] Med Surg [id number] )  Text page via [facility] Paging/Directory   See signed in provider for up to date coverage information  Social Work & Care Management Department

## 2025-06-03 NOTE — PLAN OF CARE
Pt resting in bed, no c.o pain, no nonverbal indicators of pain, tubefeed and insulin continue per orders until discharge plan in place along with BG checks, pt is mostly nonverbal, does answer yes or no by pointing or nodding but unclear if she understands the difference between yes and no, incontinent of bowel and bladder, small stool and using purewick but leaks, repositions q 2 hours with help from patient, bed alarm on although no attempts OOB

## 2025-06-03 NOTE — PROGRESS NOTES
Care Management Follow Up    Length of Stay (days): 27    Expected Discharge Date: 06/04/2025     Concerns to be Addressed: discharge planning     Patient plan of care discussed at interdisciplinary rounds: Yes    Anticipated Discharge Disposition: Hospice  Anticipated Discharge Services: None  Anticipated Discharge DME: None    Patient/family educated on Medicare website which has current facility and service quality ratings: yes  Education Provided on the Discharge Plan: Yes  Patient/Family in Agreement with the Plan: yes    Referrals Placed by CM/SW: Hospice  Private pay costs discussed: Not applicable    Discussed  Partnership in Safe Discharge Planning  document with patient/family: Yes    Additional Information:  CHW sent LTC with hospice referrals.     Pending LTC Referrals  [facility]   [address] [place] [address]  Phone: [phone]  Fax: [phone]  6/3: initial referral sent via communications tab     [facility]   [address] [place] [address]  Phone: [phone]  Fax: [phone]  6/3: initial referral sent via communications tab     [facility]   [address] [place] [address]  Phone: [phone]  Fax: [phone]  6/3: initial referral sent via communications tab     Cammie Collado  Inpatient CHW  [facility] [address]  [phone]

## 2025-06-03 NOTE — PROGRESS NOTES
[facility]    Medicine Progress Note - Hospitalist Service, GOLD TEAM 6    Date of Admission:  5/7/2025    Updates today:  - appreciate SW input  - updates to family    Assessment & Plan   Isabell Matthews is a 64F w/ PMH T1DM c/b diabetic neuropathy, orthostatic hypotension, seizures, vascular dementia, prior CVAs, hypothyroidism, recurrent UTIs, and frequent falls  admitted on 5/7/2025.   She initially presented to the ED for evaluation of stroke-like symptoms. MRI completed showing acute infarcts for which she was not a candidate for advanced therapies. Ongoing admission due to ongoing goals of care discussion between providers and family for disposition. Care conference on 5/29 with decision to transition Isabell to comfort cares and placing referrals for hospice with preference for OLP. Leaving NG access and TF as well as insulin coverage in the interim to allow for additional family to visit from out of town for now.      Goals of care  Comfort cares  Decision made to transition to comfort cares and discharge to hospice facility (hopeful for OLP) Still awaiting additional out of town family including her son and daughter so decision made to continue her tube feeds and insulin until they are able to visit and will be stopped prior to discharge. Anticipate that once these interventions are stopped that she will have days to a couple weeks. OLP denied on 5/30 due to patient not being bed bound however highly suspect this will be the case after nutrition stopped. Given barriers to alternative locations with insurance, plan to have OLP re-evaluate after TF stopped.   - continue TF/NG/ insulin NPH at this time  - family with concern for medication withdrawal side effects will work on weaning especially her psychiatric medications  - Palliative care following and appreciate assistance   - Comfort cares (no vitals, labs, etc)  - Methadone 1 mg q12h  - Lorazepam solution 0.5 mg  q4h PRN      Multifocal acute cerebral infarcts  Hx of multiple CVAs  Hx of seizures  Presented w/ new findings of dysarthria, dysphagia, decreased responsiveness, confusion. CT head negative for acute infarct or hemorrhage. CTA w/ multifocal stenosis involving L RADHA and R MCA which had progressed since 1/16/25. Stroke Neuro team consulted while in the ED. Loaded with keppra. EEG negative. Found to have UTI so concern presentation was recrudescence of prior CVA deficits however MRI brain obtained showing acute infarcts in R corona radiata, precentral gyrus and operculum. Not a candidate for advanced therapies.   - Stopping DAPT, atorvastatin due to comfort cares focus in discussion with family  - Continue keppra 250 mg BID (consider plan to transition to scheduled ativan once NG removed unless tolerates oral keppra)      Acute metabolic encephalopathy, multifactorial  Hx of vascular dementia  Acute change in status noted on arrival, possibly 2/2 acute CVA vs metabolic encephalopathy. Likely multifactorial with UTI, dehydration, hyperglycemia, hypercapnia, and hospital environment contributing, as well as newfound acute CVA as above. No significant electrolyte abnormalities. No suspicious meds. TSH 0.14. Some clinical improvement noted. Patient no longer somnolent or encephalopathic, although her cognitive function does not appear intact. Family reports patient is close to baseline.   - Delirium precautions  - Continue melatonin 1mg at bedtime -- stopping once NG removed     Dysphagia s/p NGT placement 5/10  Failed initial swallow study. Likely in the setting of acute encephalopathy and acute stroke. SLP consulted, recommended strict NPO. NG placed 5/10 and started on TF. In prior discussions, PEG not within GOC. SLP re-evaluated 5/22 with patient retaining food in mouth despite multiple cues to follow but when she does eventually there is no evidence of aspiration or dysphagia though at high risk for aspiration due  to oral retention. Given this, it is unlikely she will be able to maintain adequate PO intake to prevent malnutrition.   - RD following for TF; at goal   - Plan to remove NG and stop TF after this weekend  - Continue pureed diet with thin liquids and 1:1 supervision     DM1  Hypoglycemia  A1c of 8.8% in 3/2025. Had been NPO due to to dysphagia and now on TF. Labile blood sugars and hypoglycemia noted in setting of prior infection. PTA regimen includes lantus 18 units at bedtime. Updated Endocrine regarding plan to stop TF after family visits. Plan to stop NPH and do basal insulin only.   - Endocrine signed off  - Lantus 8 units daily at 1200 (this will remain her only coverage after TF stopped)  - NPH 13 units qAM + 7 units q PM    - Novolog carb coverage with 1 unit per 20 g CHO  - Medium intensity sliding scale q4h while on TF  - BG checks q4h, hypoglycemia protocol   - PRN D10 at 50 mL/hr if tube feeds are stopped or interrupted   - When ready to stop TF - stop NPH first and wait 12 hours prior to discontinuation of TF to prevent hypoglycemia and continue lantus only     Possible stress cardiomyopathy  Echo this admission with LVEF 50-55% with mid ventricular hypokinesis w/ preserved apical and mid segements c/w stress CM. No obvious signs of heart failure. Clinically, she has no s/sx to suggest acutely decompensated HF.  - Stopping carvedilol due to comfort cares     Hypothyroidism  Most recent TSH 0.99. Repeat TSH here 0.14. On admission, daughter noted recent medication adjustments (brand synthroid vs generic) therefore may have been over-replaced. Cannot r/o sick euthyroid state as well.  - Continue PTA PO synthroid 88mcg (plan to stop once NG removed)     CKD stage IIIb  Baseline Cr of 1.4-1.6. Cr improved to 1.3-1.4 with IVF. Suspect chronic dehydration contributing.     HTN  HLD  -180 on admission. Can allow for permissive hypertension in setting of acute stroke per Stroke Neuro.  - Stopping  medications due to comfort cares     Chronic low back pain  - Diclofenac gel PRN  - Decreasing duloxetine from 20 mg daily to 10 mg daily with plan to stop prior to discharge   - Stopping gabapentin     Closed colles' fracture of L wrist  Occurred in April, saw Ortho 4/22/25, but not deemed a surgical candidate d/t her medical comorbidities. Per Ortho, they wanted her in brace and to be NWB status at L wrist. Repeat XR redemonstrated known comminuted intra-articular fracture of distal left radius. Ortho consulted with recommendations for hand therapy and progressing ROM as tolerate out of brace.   - Coffee cup weight bearing to L wrist.  - Hand therapy if able, ROM as tolerated out of brace     Left wrist rash  Initially felt to be pressure injury from wrist brace. Concern it developed vesicular appearance however VZV negative.   - WOCN consulted     Hypernatremia, resolved  Free water deficit in setting of dysphagia and NPO status. Resolved with hypotonic fluids.      UTI, recurrent, resolved  Urine culture from 5/1/25 grew pan-susceptible E.Coli. Afebrile on arrival, WBC normal. No other obvious source of infection. Repeat UA/UC negative. Completed 7 days of IV Ceftriaxone on 5/12.          Diet: Adult Formula Drip Feeding: Continuous [email or web address] Standard 1.4; Nasogastric tube; Goal Rate: 45 ( can start at goal ); mL/hr; Initiate at 10ml/hr and advance by 10ml Q8H as tolerated.; Do not advance tube feeding rate unless K+ is = or > 3.0, Mg++...  Pureed Diet (level 4) Thin Liquids (level 0)    DVT Prophylaxis: no indicated, comfort cares  Parker Catheter: Not present  Lines: None     Cardiac Monitoring: None  Code Status: No CPR- Do NOT Intubate      Clinically Significant Risk Factors               # Hypoalbuminemia: Lowest albumin = 2.9 g/dL at 5/17/2025  6:01 AM, will monitor as appropriate     # Hypertension: Noted on problem list     # Dementia: noted on problem list         # Severe Malnutrition: based on  nutrition assessment and treatment provided per dietitian's recommendations.    # Financial/Environmental Concerns:           Social Drivers of Health    Food Insecurity: Unknown (5/17/2025)    Food Insecurity     Within the past 12 months, did you worry that your food would run out before you got money to buy more?: Patient unable to answer     Within the past 12 months, did the food you bought just not last and you didn t have money to get more?: Patient unable to answer   Depression: At risk (4/2/2025)    PHQ-2     PHQ-2 Score: 4   Housing Stability: Unknown (5/17/2025)    Housing Stability     Do you have housing? : Patient unable to answer     Are you worried about losing your housing?: Patient unable to answer   Tobacco Use: Medium Risk (4/24/2025)    Patient History     Smoking Tobacco Use: Former     Smokeless Tobacco Use: Never   Financial Resource Strain: Unknown (5/17/2025)    Financial Resource Strain     Within the past 12 months, have you or your family members you live with been unable to get utilities (heat, electricity) when it was really needed?: Patient unable to answer   Transportation Needs: Unknown (5/17/2025)    Transportation Needs     Within the past 12 months, has lack of transportation kept you from medical appointments, getting your medicines, non-medical meetings or appointments, work, or from getting things that you need?: Patient unable to answer   Interpersonal Safety: Unknown (5/17/2025)    Interpersonal Safety     Do you feel physically and emotionally safe where you currently live?: Patient unable to answer     Within the past 12 months, have you been hit, slapped, kicked or otherwise physically hurt by someone?: Patient unable to answer     Within the past 12 months, have you been humiliated or emotionally abused in other ways by your partner or ex-partner?: Patient unable to answer          Disposition Plan     Medically Ready for Discharge: medically appropriate for hospice  facility once identified           The patient's care was discussed with the Attending Physician, Dr. Foster, Bedside Nurse, Care Coordinator/, Patient, and daughter Vishal.    Deepali Mcgovern PA-C  Hospitalist Service, GOLD TEAM [address] [place] [address]  Securely message with [employer] (more info)  Text page via [facility] Paging/Directory   See signed in provider for up to date coverage information  ______________________________________________________________________    Interval History   Isabell is seen in her hospital room independently and appears comfortable.  She is unable to provide a history as she is obtunded.  No concerns as per discussion with her bedside nurse today.    Call daughter, Vishal and she is visiting today      Physical Exam   Vital Signs:                    Weight: 134 lbs 11.22 oz  GENERAL: appears comfortable. Resting supine. NAD.   HEENT: NC. AT.   RESPIRATORY: Effort normal on RA.   SKIN: No jaundice. No rashes on exposed skin      Medical Decision Making       45 MINUTES SPENT BY ME on the date of service doing chart review, history, exam, documentation & further activities per the note.      Data         Imaging results reviewed over the past 24 hrs:   No results found for this or any previous visit (from the past 24 hours).

## 2025-06-04 LAB
GLUCOSE BLDC GLUCOMTR-MCNC: 113 MG/DL (ref 70–99)
GLUCOSE BLDC GLUCOMTR-MCNC: 138 MG/DL (ref 70–99)
GLUCOSE BLDC GLUCOMTR-MCNC: 142 MG/DL (ref 70–99)
GLUCOSE BLDC GLUCOMTR-MCNC: 142 MG/DL (ref 70–99)
GLUCOSE BLDC GLUCOMTR-MCNC: 178 MG/DL (ref 70–99)
GLUCOSE BLDC GLUCOMTR-MCNC: 196 MG/DL (ref 70–99)
GLUCOSE BLDC GLUCOMTR-MCNC: 216 MG/DL (ref 70–99)
GLUCOSE BLDC GLUCOMTR-MCNC: 286 MG/DL (ref 70–99)
GLUCOSE BLDC GLUCOMTR-MCNC: 56 MG/DL (ref 70–99)

## 2025-06-04 PROCEDURE — 250N000013 HC RX MED GY IP 250 OP 250 PS 637

## 2025-06-04 PROCEDURE — 258N000001 HC RX 258

## 2025-06-04 PROCEDURE — 250N000013 HC RX MED GY IP 250 OP 250 PS 637: Performed by: PHYSICIAN ASSISTANT

## 2025-06-04 PROCEDURE — 99207 PR APP CREDIT; MD BILLING SHARED VISIT: CPT | Mod: FS | Performed by: PHYSICIAN ASSISTANT

## 2025-06-04 PROCEDURE — 120N000002 HC R&B MED SURG/OB UMMC

## 2025-06-04 PROCEDURE — 250N000013 HC RX MED GY IP 250 OP 250 PS 637: Performed by: INTERNAL MEDICINE

## 2025-06-04 PROCEDURE — 250N000009 HC RX 250: Performed by: INTERNAL MEDICINE

## 2025-06-04 PROCEDURE — 99232 SBSQ HOSP IP/OBS MODERATE 35: CPT | Mod: FS | Performed by: PEDIATRICS

## 2025-06-04 PROCEDURE — 258N000001 HC RX 258: Performed by: NURSE PRACTITIONER

## 2025-06-04 PROCEDURE — 250N000013 HC RX MED GY IP 250 OP 250 PS 637: Performed by: STUDENT IN AN ORGANIZED HEALTH CARE EDUCATION/TRAINING PROGRAM

## 2025-06-04 RX ADMIN — METHADONE HYDROCHLORIDE 1 MG: 10 CONCENTRATE ORAL at 22:00

## 2025-06-04 RX ADMIN — INSULIN HUMAN 13 UNITS: 100 INJECTION, SUSPENSION SUBCUTANEOUS at 09:33

## 2025-06-04 RX ADMIN — DICLOFENAC SODIUM 2 G: 10 GEL TOPICAL at 11:10

## 2025-06-04 RX ADMIN — DICLOFENAC SODIUM 2 G: 10 GEL TOPICAL at 09:33

## 2025-06-04 RX ADMIN — MICONAZOLE NITRATE: 20 CREAM TOPICAL at 20:53

## 2025-06-04 RX ADMIN — DICLOFENAC SODIUM 2 G: 10 GEL TOPICAL at 20:53

## 2025-06-04 RX ADMIN — DEXTROSE: 10 SOLUTION INTRAVENOUS at 23:25

## 2025-06-04 RX ADMIN — LEVETIRACETAM 250 MG: 100 SOLUTION ORAL at 09:33

## 2025-06-04 RX ADMIN — INSULIN ASPART 1 UNITS: 100 INJECTION, SOLUTION INTRAVENOUS; SUBCUTANEOUS at 18:28

## 2025-06-04 RX ADMIN — LORAZEPAM 0.5 MG: 2 CONCENTRATE ORAL at 01:54

## 2025-06-04 RX ADMIN — Medication 10 MG: at 09:33

## 2025-06-04 RX ADMIN — METHADONE HYDROCHLORIDE 1 MG: 10 CONCENTRATE ORAL at 11:10

## 2025-06-04 RX ADMIN — Medication 1 MG: at 21:57

## 2025-06-04 RX ADMIN — MICONAZOLE NITRATE: 20 CREAM TOPICAL at 09:34

## 2025-06-04 RX ADMIN — INSULIN ASPART 3 UNITS: 100 INJECTION, SOLUTION INTRAVENOUS; SUBCUTANEOUS at 11:13

## 2025-06-04 RX ADMIN — INSULIN ASPART 1 UNITS: 100 INJECTION, SOLUTION INTRAVENOUS; SUBCUTANEOUS at 14:26

## 2025-06-04 RX ADMIN — LEVOTHYROXINE SODIUM 88 MCG: 88 TABLET ORAL at 09:33

## 2025-06-04 RX ADMIN — LEVETIRACETAM 250 MG: 100 SOLUTION ORAL at 20:56

## 2025-06-04 RX ADMIN — INSULIN ASPART 2 UNITS: 100 INJECTION, SOLUTION INTRAVENOUS; SUBCUTANEOUS at 01:59

## 2025-06-04 RX ADMIN — INSULIN ASPART 1 UNITS: 100 INJECTION, SOLUTION INTRAVENOUS; SUBCUTANEOUS at 06:23

## 2025-06-04 RX ADMIN — DEXTROSE MONOHYDRATE 25 ML: 25 INJECTION, SOLUTION INTRAVENOUS at 22:04

## 2025-06-04 ASSESSMENT — ACTIVITIES OF DAILY LIVING (ADL)
ADLS_ACUITY_SCORE: 95
ADLS_ACUITY_SCORE: 94
ADLS_ACUITY_SCORE: 95

## 2025-06-04 NOTE — PLAN OF CARE
Goal Outcome Evaluation:      Plan of Care Reviewed With: patient    Overall Patient Progress: no changeOverall Patient Progress: no change    Outcome Evaluation: patient nods yes or no if question asked. turn every two hours. incont of urine and bowel. blood sugar every 4 hours awaiting for hospice placement

## 2025-06-04 NOTE — PROGRESS NOTES
[facility]    Medicine Progress Note - Hospitalist Service, GOLD TEAM 6    Date of Admission:  5/7/2025    Updates today:  -Appreciate excellent social work cares, working on placement for hospice, continues to be ready to discharge to hospice once facility identified    Assessment & Plan   Isabell Matthews is a 64F w/ PMH T1DM c/b diabetic neuropathy, orthostatic hypotension, seizures, vascular dementia, prior CVAs, hypothyroidism, recurrent UTIs, and frequent falls  admitted on 5/7/2025.   She initially presented to the ED for evaluation of stroke-like symptoms. MRI completed showing acute infarcts for which she was not a candidate for advanced therapies. Ongoing admission due to ongoing goals of care discussion between providers and family for disposition. Care conference on 5/29 with decision to transition Isabell to comfort cares and placing referrals for hospice with preference for [facility]. Leaving NG access and TF as well as insulin coverage in the interim to allow for additional family to visit from out of town for now.      Goals of care  Comfort cares  Decision made to transition to comfort cares and discharge to hospice facility (hopeful for OLP) Still awaiting additional out of town family including her son and daughter so decision made to continue her tube feeds and insulin until they are able to visit and will be stopped prior to discharge. Anticipate that once these interventions are stopped that she will have days to a couple weeks. OLP denied on 5/30 due to patient not being bed bound however highly suspect this will be the case after nutrition stopped. Given barriers to alternative locations with insurance, plan to have OLP re-evaluate after TF stopped.   - continue TF/NG/ insulin NPH at this time  - family with concern for medication withdrawal side effects will work on weaning especially her psychiatric medications  - Palliative care following and appreciate  assistance   - Comfort cares (no vitals, labs, etc)  - Methadone 1 mg q12h  - Lorazepam solution 0.5 mg q4h PRN      Multifocal acute cerebral infarcts  Hx of multiple CVAs  Hx of seizures  Presented w/ new findings of dysarthria, dysphagia, decreased responsiveness, confusion. CT head negative for acute infarct or hemorrhage. CTA w/ multifocal stenosis involving L RADHA and R MCA which had progressed since 1/16/25. Stroke Neuro team consulted while in the ED. Loaded with keppra. EEG negative. Found to have UTI so concern presentation was recrudescence of prior CVA deficits however MRI brain obtained showing acute infarcts in R corona radiata, precentral gyrus and operculum. Not a candidate for advanced therapies.   - Stopping DAPT, atorvastatin due to comfort cares focus in discussion with family  - Continue keppra 250 mg BID (consider plan to transition to scheduled ativan once NG removed unless tolerates oral keppra)      Acute metabolic encephalopathy, multifactorial  Hx of vascular dementia  Acute change in status noted on arrival, possibly 2/2 acute CVA vs metabolic encephalopathy. Likely multifactorial with UTI, dehydration, hyperglycemia, hypercapnia, and hospital environment contributing, as well as newfound acute CVA as above. No significant electrolyte abnormalities. No suspicious meds. TSH 0.14. Some clinical improvement noted. Patient no longer somnolent or encephalopathic, although her cognitive function does not appear intact. Family reports patient is close to baseline.   - Delirium precautions  - Continue melatonin 1mg at bedtime -- stopping once NG removed     Dysphagia s/p NGT placement 5/10  Failed initial swallow study. Likely in the setting of acute encephalopathy and acute stroke. SLP consulted, recommended strict NPO. NG placed 5/10 and started on TF. In prior discussions, PEG not within GOC. SLP re-evaluated 5/22 with patient retaining food in mouth despite multiple cues to follow but when she  does eventually there is no evidence of aspiration or dysphagia though at high risk for aspiration due to oral retention. Given this, it is unlikely she will be able to maintain adequate PO intake to prevent malnutrition.   - RD following for TF; at goal   - Plan to remove NG and stop TF after this weekend  - Continue pureed diet with thin liquids and 1:1 supervision     DM1  Hypoglycemia  A1c of 8.8% in 3/2025. Had been NPO due to to dysphagia and now on TF. Labile blood sugars and hypoglycemia noted in setting of prior infection. PTA regimen includes lantus 18 units at bedtime. Updated Endocrine regarding plan to stop TF after family visits. Plan to stop NPH and do basal insulin only.   - Endocrine signed off  - Lantus 8 units daily at 1200 (this will remain her only coverage after TF stopped)  - NPH 13 units qAM + 7 units q PM    - Novolog carb coverage with 1 unit per 20 g CHO  - Medium intensity sliding scale q4h while on TF  - BG checks q4h, hypoglycemia protocol   - PRN D10 at 50 mL/hr if tube feeds are stopped or interrupted   - When ready to stop TF - stop NPH first and wait 12 hours prior to discontinuation of TF to prevent hypoglycemia and continue lantus only     Possible stress cardiomyopathy  Echo this admission with LVEF 50-55% with mid ventricular hypokinesis w/ preserved apical and mid segements c/w stress CM. No obvious signs of heart failure. Clinically, she has no s/sx to suggest acutely decompensated HF.  - Stopping carvedilol due to comfort cares     Hypothyroidism  Most recent TSH 0.99. Repeat TSH here 0.14. On admission, daughter noted recent medication adjustments (brand synthroid vs generic) therefore may have been over-replaced. Cannot r/o sick euthyroid state as well.  - Continue PTA PO synthroid 88mcg (plan to stop once NG removed)     CKD stage IIIb  Baseline Cr of 1.4-1.6. Cr improved to 1.3-1.4 with IVF. Suspect chronic dehydration contributing.     HTN  HLD  -180 on admission.  Can allow for permissive hypertension in setting of acute stroke per Stroke Neuro.  - Stopping medications due to comfort cares     Chronic low back pain  - Diclofenac gel PRN  - Decreasing duloxetine from 20 mg daily to 10 mg daily with plan to stop prior to discharge   - Stopping gabapentin     Closed colles' fracture of L wrist  Occurred in April, saw Ortho 4/22/25, but not deemed a surgical candidate d/t her medical comorbidities. Per Ortho, they wanted her in brace and to be NWB status at L wrist. Repeat XR redemonstrated known comminuted intra-articular fracture of distal left radius. Ortho consulted with recommendations for hand therapy and progressing ROM as tolerate out of brace.   - Coffee cup weight bearing to L wrist.  - Hand therapy if able, ROM as tolerated out of brace     Left wrist rash  Initially felt to be pressure injury from wrist brace. Concern it developed vesicular appearance however VZV negative.   - WOCN consulted     Hypernatremia, resolved  Free water deficit in setting of dysphagia and NPO status. Resolved with hypotonic fluids.      UTI, recurrent, resolved  Urine culture from 5/1/25 grew pan-susceptible E.Coli. Afebrile on arrival, WBC normal. No other obvious source of infection. Repeat UA/UC negative. Completed 7 days of IV Ceftriaxone on 5/12.          Diet: Adult Formula Drip Feeding: Continuous G2 Crowd Standard 1.4; Nasogastric tube; Goal Rate: 45 ( can start at goal ); mL/hr; Initiate at 10ml/hr and advance by 10ml Q8H as tolerated.; Do not advance tube feeding rate unless K+ is = or > 3.0, Mg++...  Pureed Diet (level 4) Thin Liquids (level 0)    DVT Prophylaxis: Not indicated as patient is on hospice  Parker Catheter: Not present  Lines: None     Cardiac Monitoring: None  Code Status: No CPR- Do NOT Intubate      Clinically Significant Risk Factors               # Hypoalbuminemia: Lowest albumin = 2.9 g/dL at 5/17/2025  6:01 AM, will monitor as appropriate     # Hypertension:  Noted on problem list     # Dementia: noted on problem list         # Severe Malnutrition: based on nutrition assessment and treatment provided per dietitian's recommendations.    # Financial/Environmental Concerns:           Social Drivers of Health    Food Insecurity: Unknown (5/17/2025)    Food Insecurity     Within the past 12 months, did you worry that your food would run out before you got money to buy more?: Patient unable to answer     Within the past 12 months, did the food you bought just not last and you didn t have money to get more?: Patient unable to answer   Depression: At risk (4/2/2025)    PHQ-2     PHQ-2 Score: 4   Housing Stability: Unknown (5/17/2025)    Housing Stability     Do you have housing? : Patient unable to answer     Are you worried about losing your housing?: Patient unable to answer   Tobacco Use: Medium Risk (4/24/2025)    Patient History     Smoking Tobacco Use: Former     Smokeless Tobacco Use: Never   Financial Resource Strain: Unknown (5/17/2025)    Financial Resource Strain     Within the past 12 months, have you or your family members you live with been unable to get utilities (heat, electricity) when it was really needed?: Patient unable to answer   Transportation Needs: Unknown (5/17/2025)    Transportation Needs     Within the past 12 months, has lack of transportation kept you from medical appointments, getting your medicines, non-medical meetings or appointments, work, or from getting things that you need?: Patient unable to answer   Interpersonal Safety: Unknown (5/17/2025)    Interpersonal Safety     Do you feel physically and emotionally safe where you currently live?: Patient unable to answer     Within the past 12 months, have you been hit, slapped, kicked or otherwise physically hurt by someone?: Patient unable to answer     Within the past 12 months, have you been humiliated or emotionally abused in other ways by your partner or ex-partner?: Patient unable to answer           Disposition Plan     Medically Ready for Discharge: Medically ready to discharge to hospice once facility identified           The patient's care was discussed with the Attending Physician, Dr. Shearer and Bedside Nurse and KINZA Mcgovern PA-C  Hospitalist Service, GOLD TEAM [address] [place] [address]  Securely message with [employer] (more info)  Text page via [facility] Paging/Directory   See signed in provider for up to date coverage information  ______________________________________________________________________    Interval History   Isabell is seen in her hospital room independently and is unable to provide history as she is nonverbal.    Physical Exam   Vital Signs:                    Weight: 134 lbs 11.22 oz  GENERAL: appears comfortable. Resting supine. NAD.   HEENT: Spontaneously opens eyes and opens eyes to voice.  Sclera noninjected and anicteric.  Pupils equal and round NC. AT.   CV: RRR. S1, S2. No murmurs appreciated.   RESPIRATORY: Effort normal on RA.   SKIN: No jaundice. No rashes on exposed skin    Medical Decision Making       30 MINUTES SPENT BY ME on the date of service doing chart review, history, exam, documentation & further activities per the note.      Data

## 2025-06-04 NOTE — PROGRESS NOTES
Care Management Follow Up    Length of Stay (days): 28    Expected Discharge Date: 06/06/2025     Concerns to be Addressed: discharge planning     Patient plan of care discussed at interdisciplinary rounds: Yes    Anticipated Discharge Disposition: Hospice  Anticipated Discharge Services: None  Anticipated Discharge DME: None    Patient/family educated on Medicare website which has current facility and service quality ratings: yes  Education Provided on the Discharge Plan: Yes  Patient/Family in Agreement with the Plan: yes    Referrals Placed by CM/SW: Hospice  Private pay costs discussed: Not applicable    Discussed  Partnership in Safe Discharge Planning  document with patient/family: Yes    Additional Information:  CHW followed up on pending LTC referrals and updated daughter Vishal.     Pending LTC Referrals  [facility]   [address] [place] [address]  Phone: [phone]  Fax: [phone]  6/4: CHW left VM requesting a callback      [facility]   [address] [place] [address]  Phone: [phone]  Fax: [phone]  6/4: facility requested referral be resent via email due to fax issues. Referral resent to Beverly Davila in admissions ([email or web address])     [facility]   [address] [place] [address]  Phone: [phone]  Fax: [phone]  6/4: facility is still reviewing       Cammie Tobias  Inpatient CHW  [facility] [address]  [phone]

## 2025-06-05 VITALS
SYSTOLIC BLOOD PRESSURE: 142 MMHG | BODY MASS INDEX: 23.83 KG/M2 | DIASTOLIC BLOOD PRESSURE: 64 MMHG | HEART RATE: 84 BPM | WEIGHT: 134.48 LBS | OXYGEN SATURATION: 96 % | RESPIRATION RATE: 16 BRPM | TEMPERATURE: 97.9 F

## 2025-06-05 DIAGNOSIS — M25.532 WRIST PAIN, LEFT: Primary | ICD-10-CM

## 2025-06-05 LAB
GLUCOSE BLDC GLUCOMTR-MCNC: 167 MG/DL (ref 70–99)
GLUCOSE BLDC GLUCOMTR-MCNC: 236 MG/DL (ref 70–99)
GLUCOSE BLDC GLUCOMTR-MCNC: 250 MG/DL (ref 70–99)
GLUCOSE BLDC GLUCOMTR-MCNC: 271 MG/DL (ref 70–99)
GLUCOSE BLDC GLUCOMTR-MCNC: 300 MG/DL (ref 70–99)
GLUCOSE BLDC GLUCOMTR-MCNC: 308 MG/DL (ref 70–99)
GLUCOSE BLDC GLUCOMTR-MCNC: 359 MG/DL (ref 70–99)
GLUCOSE BLDC GLUCOMTR-MCNC: 438 MG/DL (ref 70–99)
GLUCOSE BLDC GLUCOMTR-MCNC: 465 MG/DL (ref 70–99)

## 2025-06-05 PROCEDURE — 250N000009 HC RX 250: Performed by: INTERNAL MEDICINE

## 2025-06-05 PROCEDURE — 258N000001 HC RX 258: Performed by: NURSE PRACTITIONER

## 2025-06-05 PROCEDURE — 250N000013 HC RX MED GY IP 250 OP 250 PS 637

## 2025-06-05 PROCEDURE — 99232 SBSQ HOSP IP/OBS MODERATE 35: CPT | Mod: FS | Performed by: PEDIATRICS

## 2025-06-05 PROCEDURE — 120N000002 HC R&B MED SURG/OB UMMC

## 2025-06-05 PROCEDURE — 99207 PR APP CREDIT; MD BILLING SHARED VISIT: CPT | Mod: FS | Performed by: PHYSICIAN ASSISTANT

## 2025-06-05 PROCEDURE — 250N000013 HC RX MED GY IP 250 OP 250 PS 637: Performed by: PHYSICIAN ASSISTANT

## 2025-06-05 PROCEDURE — 250N000013 HC RX MED GY IP 250 OP 250 PS 637: Performed by: INTERNAL MEDICINE

## 2025-06-05 RX ADMIN — LEVETIRACETAM 250 MG: 100 SOLUTION ORAL at 09:26

## 2025-06-05 RX ADMIN — LEVOTHYROXINE SODIUM 88 MCG: 88 TABLET ORAL at 09:33

## 2025-06-05 RX ADMIN — Medication 10 MG: at 09:26

## 2025-06-05 RX ADMIN — MICONAZOLE NITRATE: 20 CREAM TOPICAL at 09:26

## 2025-06-05 RX ADMIN — DICLOFENAC SODIUM 2 G: 10 GEL TOPICAL at 09:26

## 2025-06-05 RX ADMIN — INSULIN ASPART 4 UNITS: 100 INJECTION, SOLUTION INTRAVENOUS; SUBCUTANEOUS at 06:32

## 2025-06-05 RX ADMIN — INSULIN ASPART 7 UNITS: 100 INJECTION, SOLUTION INTRAVENOUS; SUBCUTANEOUS at 22:11

## 2025-06-05 RX ADMIN — INSULIN ASPART 4 UNITS: 100 INJECTION, SOLUTION INTRAVENOUS; SUBCUTANEOUS at 14:45

## 2025-06-05 RX ADMIN — DICLOFENAC SODIUM 2 G: 10 GEL TOPICAL at 12:30

## 2025-06-05 RX ADMIN — INSULIN ASPART 2 UNITS: 100 INJECTION, SOLUTION INTRAVENOUS; SUBCUTANEOUS at 02:30

## 2025-06-05 RX ADMIN — DEXTROSE: 10 SOLUTION INTRAVENOUS at 14:52

## 2025-06-05 RX ADMIN — METHADONE HYDROCHLORIDE 1 MG: 10 CONCENTRATE ORAL at 09:25

## 2025-06-05 RX ADMIN — DICLOFENAC SODIUM 2 G: 10 GEL TOPICAL at 18:09

## 2025-06-05 RX ADMIN — INSULIN ASPART 3 UNITS: 100 INJECTION, SOLUTION INTRAVENOUS; SUBCUTANEOUS at 09:29

## 2025-06-05 RX ADMIN — INSULIN ASPART 5 UNITS: 100 INJECTION, SOLUTION INTRAVENOUS; SUBCUTANEOUS at 18:10

## 2025-06-05 RX ADMIN — LEVETIRACETAM 250 MG: 100 SOLUTION ORAL at 21:41

## 2025-06-05 RX ADMIN — MICONAZOLE NITRATE: 20 CREAM TOPICAL at 21:40

## 2025-06-05 RX ADMIN — METHADONE HYDROCHLORIDE 1 MG: 10 CONCENTRATE ORAL at 21:41

## 2025-06-05 RX ADMIN — DICLOFENAC SODIUM 2 G: 10 GEL TOPICAL at 21:41

## 2025-06-05 ASSESSMENT — ACTIVITIES OF DAILY LIVING (ADL)
ADLS_ACUITY_SCORE: 95
ADLS_ACUITY_SCORE: 99
ADLS_ACUITY_SCORE: 94
ADLS_ACUITY_SCORE: 95
ADLS_ACUITY_SCORE: 95
ADLS_ACUITY_SCORE: 103
ADLS_ACUITY_SCORE: 99
ADLS_ACUITY_SCORE: 95
ADLS_ACUITY_SCORE: 103
ADLS_ACUITY_SCORE: 99
ADLS_ACUITY_SCORE: 99
ADLS_ACUITY_SCORE: 98
ADLS_ACUITY_SCORE: 99
ADLS_ACUITY_SCORE: 98
ADLS_ACUITY_SCORE: 95
ADLS_ACUITY_SCORE: 103
ADLS_ACUITY_SCORE: 103
ADLS_ACUITY_SCORE: 95
ADLS_ACUITY_SCORE: 95
ADLS_ACUITY_SCORE: 94
ADLS_ACUITY_SCORE: 95

## 2025-06-05 NOTE — PROGRESS NOTES
Care Management Follow Up    Length of Stay (days): 29    Expected Discharge Date: 06/07/2025     Concerns to be Addressed: discharge planning     Patient plan of care discussed at interdisciplinary rounds: Yes    Anticipated Discharge Disposition: LTC with Hospice      Anticipated Discharge Services: Hospice  Anticipated Discharge DME: None    Patient/family educated on Medicare website which has current facility and service quality ratings: yes  Education Provided on the Discharge Plan: Yes  Patient/Family in Agreement with the Plan: yes    Referrals Placed by CM/SW: LTC, Hospice, [facility]  Private pay costs discussed: Not applicable    Discussed  Partnership in Safe Discharge Planning  document with patient/family: Yes     Handoff Completed: No, handoff not indicated or clinically appropriate    Additional Information:  KINZA updated provider that no facility near [place] in [facility] has been found yet for LTC. KINZA updated patient's daughter, Vishal, that only one active referral of the 3 placed in [facility] remain.  KINZA will follow up on this referral and inquired with Vishal if she would now like  to start placing LTC referrals near [place], as previously discussed, as this would at least facilitate [place] family to visit without having to drive into the [facility].  Piperilsa affirmed she would like to send out these additional LTC referrals.  Vishal shared that she now feels more urgency getting pt discharged, as pt pulled out her NG tube yesterday and did not want to put pt through getting the NG replaced.      KINZA spoke with provider and updated her of the  conversation with Vishal prior to provider calling Vishal.      KINZA requested assistance from URBANO Bonds on following up with LTC in [place] and placing additional LTC referrals near [place].  Please see CHW note from today for update.     Next Steps:   - follow up with LTC referrals, place hospice referral once LTC is found.   - revisit [facility]  Hospice if needed, as tube feed is no longer a barrier.   __________________________     ROBIN Sinclair, [facility]  [facility]  [address]   Desk Phone: [phone]   Securely message with [employer] (Search Name or  Med Surg [id number] )  Text page via [facility] Paging/Directory   See signed in provider for up to date coverage information  Social Work & Care Management Department

## 2025-06-05 NOTE — PROVIDER NOTIFICATION
Notified Deepali MAN in regards to patient iv fluids dextrose what dose she would like it since blood sugar are high.

## 2025-06-05 NOTE — PROGRESS NOTES
Care Management Follow Up    Length of Stay (days): 29    Expected Discharge Date: 06/06/2025     Concerns to be Addressed: discharge planning     Patient plan of care discussed at interdisciplinary rounds: Yes    Anticipated Discharge Disposition: Hospice  Anticipated Discharge Services: None  Anticipated Discharge DME: None    Patient/family educated on Medicare website which has current facility and service quality ratings: yes  Education Provided on the Discharge Plan: Yes  Patient/Family in Agreement with the Plan: yes    Referrals Placed by CM/SW: Hospice  Private pay costs discussed: Not applicable    Discussed  Partnership in Safe Discharge Planning  document with patient/family: Yes    Additional Information:  CHW followed up on pending LTC referrals and sent additional referrals to facilities near [place].     Pending LTC Referrals  [facility]   [address] [place] [address]  Phone: [phone]  Fax: [phone]  6/5: admissions requested referral to be resent via email ([email or web address]). Referral resent.     [facility]   [address] [place] [address]  Phone: [phone]  Fax: [phone]  6/5: initial referral sent    [facility]  [address] [place] [address]  Phone: [phone]  Fax: [phone]  6/5: initial referral sent    [facility]  [address] [place] [address]  Phone: [phone]  Fax: [phone]  6/5: initial referral sent    [facility]  [address] [place] [address]  Phone: [phone]  Fax: [phone]  6/5: initial referral sent    [facility] at [address] [address]  Phone: [phone]  Fax: [phone]  6/5: initial referral sent     Declined Referrals   [facility]- not able to meet patient's needs   [facility]- acuity too high      Cammie Tobias  Inpatient CHW  [facility] [address]  [phone]

## 2025-06-05 NOTE — PROGRESS NOTES
Brief Hospital Medicine Note:     Paged by RN that patient had self removed NG tube. Patient is currently on comfort cares with plans to discharge on hospice. Per review of day team's note. This had been left in place with patient awaiting family to visit her in the hospital therefore NG access and TF has been continued. Patient is unable to communicate her desire to have have tube withheld or replaced.  Given still waiting for out of town relatives to visit patient, will attempt to replace NG at this time. If patient shows resistance to having this replaced, will hold off on NG access at this time.     Addendum: family now presenting to bedside and no longer wishes to have NG placed. Will hold off on this for now. NPH has been held in light of her not receiving TF.     Divya Fry PA-C  Hospitalist Service  Contact information available via [facility] Paging/Directory

## 2025-06-05 NOTE — PLAN OF CARE
Goal Outcome Evaluation:      Plan of Care Reviewed With: patient    Overall Patient Progress: no changeOverall Patient Progress: no change    Outcome Evaluation: patient iv fluid adjusted to her blood sugar. patient had a couple bites this am of cream of wheat. does hold food in mouth but did eventu;lly swallow. patient incont of urine and bowel. tried to have potatoes for dinner has a bite. did drink some apple juice . contiue plan of care

## 2025-06-05 NOTE — PLAN OF CARE
Goal Outcome Evaluation:    Plan of Care Reviewed With: patient    Overall Patient Progress: no change  Overall Patient Progress: no change    Outcome Evaluation: Assumed care from [id number]. Alert, unable to assess orientation. Vitals not monitored. Tolerated repositioning Q2 well, incontinent on bowel & bladder. NG removed by patient, TF stopped. DaughterNehal, does not want another NG placed. BG of 56 mg/dL at bedtime, D50 given IV push as NPH was held.. initiated D10 at 75mL/hr for continuation of BG stabilization. Encourage diet, oral intake. 1 PIV intact, SL. Sliding scale administered as ordered. Continue with plan of care.     Candelaria Obrien RN

## 2025-06-05 NOTE — PROGRESS NOTES
[facility]    Medicine Progress Note - Hospitalist Service, GOLD TEAM 6    Date of Admission:  5/7/2025     Updates today:  -Patient self dislodged NG overnight, family preferred to leave out for now  - TF stopped for now  - NPH stopped for now  -Added range to D10 infusion instructions though ultimately appropriate for nursing to titrate to normoglycemia  - appreciate social work input, continue to assess for hospice placement closer to [place]  - stop levothyroxine and melatonin as was contingent on NG    Assessment & Plan   Isabell Matthews is a 64F w/ PMH T1DM c/b diabetic neuropathy, orthostatic hypotension, seizures, vascular dementia, prior CVAs, hypothyroidism, recurrent UTIs, and frequent falls  admitted on 5/7/2025.   She initially presented to the ED for evaluation of stroke-like symptoms. MRI completed showing acute infarcts for which she was not a candidate for advanced therapies. Ongoing admission due to ongoing goals of care discussion between providers and family for disposition. Care conference on 5/29 with decision to transition Isabell to comfort cares and placing referrals for hospice with preference for OLP. Leaving NG access and TF as well as insulin coverage in the interim to allow for additional family to visit from out of town for now.      Goals of care  Comfort cares  Decision made to transition to comfort cares and discharge to hospice facility (hopeful for OLP) Still awaiting additional out of town family including her son and daughter so decision made to continue her tube feeds and insulin until they are able to visit and will be stopped prior to discharge. Anticipate that once these interventions are stopped that she will have days to a couple weeks. OLP denied on 5/30 due to patient not being bed bound however highly suspect this will be the case after nutrition stopped. Given barriers to alternative locations with insurance, plan to have  OLP re-evaluate after TF stopped (stopped as below 6/4).   - NG dislodged 6/4 evening   - stop TF   - stop NPH  - family with concern for medication withdrawal side effects will work on weaning especially her psychiatric medications continue current dose duloxetine per d/w daughter for now  - Palliative care following and appreciate assistance   - Comfort cares (no vitals, labs, etc)  - Methadone 1 mg q12h  - Lorazepam solution 0.5 mg q4h PRN      Multifocal acute cerebral infarcts  Hx of multiple CVAs  Hx of seizures  Presented w/ new findings of dysarthria, dysphagia, decreased responsiveness, confusion. CT head negative for acute infarct or hemorrhage. CTA w/ multifocal stenosis involving L RADHA and R MCA which had progressed since 1/16/25. Stroke Neuro team consulted while in the ED. Loaded with keppra. EEG negative. Found to have UTI so concern presentation was recrudescence of prior CVA deficits however MRI brain obtained showing acute infarcts in R corona radiata, precentral gyrus and operculum. Not a candidate for advanced therapies.   - Stopping DAPT, atorvastatin due to comfort cares focus in discussion with family  - Continue keppra 250 mg BID (tolerating oral dosing)      Acute metabolic encephalopathy, multifactorial  Hx of vascular dementia  Acute change in status noted on arrival, possibly 2/2 acute CVA vs metabolic encephalopathy. Likely multifactorial with UTI, dehydration, hyperglycemia, hypercapnia, and hospital environment contributing, as well as newfound acute CVA as above. No significant electrolyte abnormalities. No suspicious meds. TSH 0.14. Some clinical improvement noted. Patient no longer somnolent or encephalopathic, although her cognitive function does not appear intact. Family reports patient is close to baseline.   - Delirium precautions  - Continue melatonin 1mg at bedtime -- stopping once NG removed     NGT removed per patient 6/4 evening  Dysphagia s/p NGT placement 5/10  Failed  initial swallow study. Likely in the setting of acute encephalopathy and acute stroke. SLP consulted, recommended strict NPO. NG placed 5/10 and started on TF. In prior discussions, PEG not within GOC. SLP re-evaluated 5/22 with patient retaining food in mouth despite multiple cues to follow but when she does eventually there is no evidence of aspiration or dysphagia though at high risk for aspiration due to oral retention. Given this, it is unlikely she will be able to maintain adequate PO intake to prevent malnutrition. RD followed for TF input and were tolerated at goal.   - pt self- removed NG 6/4 evening   - Continue pureed diet with thin liquids and 1:1 supervision  - continue to involve family in ongoing discussions      DM1  Hypoglycemia  A1c of 8.8% in 3/2025. Had been NPO due to to dysphagia and now on TF. Labile blood sugars and hypoglycemia noted in setting of prior infection. PTA regimen includes lantus 18 units at bedtime. Updated Endocrine regarding plan to stop TF after family visits. Plan to stop NPH and do basal insulin only. Not on carb coverage.   - stop NPH as no longer on TF (above)  - Endocrine signed off but helping peripherally   - Lantus 8 units daily at 1200 (this will remain her only coverage after TF stopped)  - Medium intensity sliding scale q4h while on TF  - BG checks q4h, hypoglycemia protocol   - PRN D10 at range 30-75cc/h titrated      Possible stress cardiomyopathy  Echo this admission with LVEF 50-55% with mid ventricular hypokinesis w/ preserved apical and mid segements c/w stress CM. No obvious signs of heart failure. Clinically, she has no s/sx to suggest acutely decompensated HF. Has remained RRR.   - Stopping carvedilol due to comfort cares     Hypothyroidism  Most recent TSH 0.99. Repeat TSH here 0.14. On admission, daughter noted recent medication adjustments (brand synthroid vs generic) therefore may have been over-replaced. Cannot r/o sick euthyroid state as well.  -  stop PTA PO synthroid 88mcg (NG removed)     CKD stage IIIb  Baseline Cr of 1.4-1.6. Cr improved to 1.3-1.4 with IVF. Suspect chronic dehydration contributing.     HTN  HLD  -180 on admission. Can allow for permissive hypertension in setting of acute stroke per Stroke Neuro.  - Stopping medications due to comfort cares     Chronic low back pain  - Diclofenac gel PRN  - Decreasing duloxetine from 20 mg daily to 10 mg daily with plan to stop prior to discharge   - Stopping gabapentin     Closed colles' fracture of L wrist  Occurred in April, saw Ortho 4/22/25, but not deemed a surgical candidate d/t her medical comorbidities. Per Ortho, they wanted her in brace and to be NWB status at L wrist. Repeat XR redemonstrated known comminuted intra-articular fracture of distal left radius. Ortho consulted with recommendations for hand therapy and progressing ROM as tolerate out of brace.   - Coffee cup weight bearing to L wrist.  - Hand therapy if able, ROM as tolerated out of brace     Left wrist rash  Initially felt to be pressure injury from wrist brace. Concern it developed vesicular appearance however VZV negative.   - WOCN consulted     Hypernatremia, resolved  Free water deficit in setting of dysphagia and NPO status. Resolved with hypotonic fluids.      UTI, recurrent, resolved  Urine culture from 5/1/25 grew pan-susceptible E.Coli. Afebrile on arrival, WBC normal. No other obvious source of infection. Repeat UA/UC negative. Completed 7 days of IV Ceftriaxone on 5/12.          Diet: Adult Formula Drip Feeding: Continuous [place] Standard 1.4; Nasogastric tube; Goal Rate: 45 ( can start at goal ); mL/hr; Initiate at 10ml/hr and advance by 10ml Q8H as tolerated.; Do not advance tube feeding rate unless K+ is = or > 3.0, Mg++...  Pureed Diet (level 4) Thin Liquids (level 0)    DVT Prophylaxis: Not indicated, hospice  Parker Catheter: Not present  Lines: None     Cardiac Monitoring: None  Code Status: No CPR- Do  NOT Intubate      Clinically Significant Risk Factors               # Hypoalbuminemia: Lowest albumin = 2.9 g/dL at 5/17/2025  6:01 AM, will monitor as appropriate     # Hypertension: Noted on problem list     # Dementia: noted on problem list         # Severe Malnutrition: based on nutrition assessment and treatment provided per dietitian's recommendations.    # Financial/Environmental Concerns:           Social Drivers of Health    Food Insecurity: Unknown (5/17/2025)    Food Insecurity     Within the past 12 months, did you worry that your food would run out before you got money to buy more?: Patient unable to answer     Within the past 12 months, did the food you bought just not last and you didn t have money to get more?: Patient unable to answer   Depression: At risk (4/2/2025)    PHQ-2     PHQ-2 Score: 4   Housing Stability: Unknown (5/17/2025)    Housing Stability     Do you have housing? : Patient unable to answer     Are you worried about losing your housing?: Patient unable to answer   Tobacco Use: Medium Risk (4/24/2025)    Patient History     Smoking Tobacco Use: Former     Smokeless Tobacco Use: Never   Financial Resource Strain: Unknown (5/17/2025)    Financial Resource Strain     Within the past 12 months, have you or your family members you live with been unable to get utilities (heat, electricity) when it was really needed?: Patient unable to answer   Transportation Needs: Unknown (5/17/2025)    Transportation Needs     Within the past 12 months, has lack of transportation kept you from medical appointments, getting your medicines, non-medical meetings or appointments, work, or from getting things that you need?: Patient unable to answer   Interpersonal Safety: Unknown (5/17/2025)    Interpersonal Safety     Do you feel physically and emotionally safe where you currently live?: Patient unable to answer     Within the past 12 months, have you been hit, slapped, kicked or otherwise physically hurt by  someone?: Patient unable to answer     Within the past 12 months, have you been humiliated or emotionally abused in other ways by your partner or ex-partner?: Patient unable to answer          Disposition Plan     Medically Ready for Discharge: Ready once hospice placement available           The patient's care was discussed with the Attending Physician, Dr. Shearer, Bedside Nurse, Care Coordinator/, Patient, and Patient's Family.    Deepali Mcgovern PA-C  Hospitalist Service, GOLD TEAM [address] [place] [address]  Securely message with My[employer] (more info)  Text page via [facility] Paging/Directory   See signed in provider for up to date coverage information  ______________________________________________________________________    Interval History   Isabell is seen in her hospital room in the company at the bedside aid.  She has not had any outward expressions of pain noted per staff including any grimacing, withdrawing.     Patient self dislodged her NG overnight and ultimately this was not replaced as per discussions with patient's family    Physical Exam   Vital Signs:                    Weight: 134 lbs 7.69 oz  GENERAL: appears comfortable. Resting supine. NAD.   HEENT: Spontaneously opens eyes and opens eyes to voice.  Sclera noninjected and anicteric.  Pupils equal and round NC. AT.   CV: RRR. S1, S2. No murmurs appreciated.   RESPIRATORY: Effort normal on RA.   SKIN: No jaundice. No rashes on exposed skin    Medical Decision Making       40 MINUTES SPENT BY ME on the date of service doing chart review, history, exam, documentation & further activities per the note.      Data

## 2025-06-06 LAB
GLUCOSE BLDC GLUCOMTR-MCNC: 200 MG/DL (ref 70–99)
GLUCOSE BLDC GLUCOMTR-MCNC: 240 MG/DL (ref 70–99)
GLUCOSE BLDC GLUCOMTR-MCNC: 248 MG/DL (ref 70–99)
GLUCOSE BLDC GLUCOMTR-MCNC: 251 MG/DL (ref 70–99)
GLUCOSE BLDC GLUCOMTR-MCNC: 263 MG/DL (ref 70–99)
GLUCOSE BLDC GLUCOMTR-MCNC: 343 MG/DL (ref 70–99)
GLUCOSE BLDC GLUCOMTR-MCNC: 392 MG/DL (ref 70–99)

## 2025-06-06 PROCEDURE — 250N000011 HC RX IP 250 OP 636: Performed by: PHYSICIAN ASSISTANT

## 2025-06-06 PROCEDURE — 250N000012 HC RX MED GY IP 250 OP 636 PS 637: Performed by: STUDENT IN AN ORGANIZED HEALTH CARE EDUCATION/TRAINING PROGRAM

## 2025-06-06 PROCEDURE — 99207 PR APP CREDIT; MD BILLING SHARED VISIT: CPT | Mod: FS | Performed by: PHYSICIAN ASSISTANT

## 2025-06-06 PROCEDURE — 250N000013 HC RX MED GY IP 250 OP 250 PS 637: Performed by: PHYSICIAN ASSISTANT

## 2025-06-06 PROCEDURE — 99232 SBSQ HOSP IP/OBS MODERATE 35: CPT | Mod: FS | Performed by: PEDIATRICS

## 2025-06-06 PROCEDURE — 120N000002 HC R&B MED SURG/OB UMMC

## 2025-06-06 PROCEDURE — 250N000013 HC RX MED GY IP 250 OP 250 PS 637: Performed by: INTERNAL MEDICINE

## 2025-06-06 RX ORDER — LORAZEPAM 2 MG/ML
0.5 CONCENTRATE ORAL EVERY 4 HOURS PRN
Status: DISCONTINUED | OUTPATIENT
Start: 2025-06-06 | End: 2025-06-12 | Stop reason: HOSPADM

## 2025-06-06 RX ORDER — DEXTROSE, SODIUM CHLORIDE, SODIUM LACTATE, POTASSIUM CHLORIDE, AND CALCIUM CHLORIDE 5; .6; .31; .03; .02 G/100ML; G/100ML; G/100ML; G/100ML; G/100ML
INJECTION, SOLUTION INTRAVENOUS CONTINUOUS
Status: DISCONTINUED | OUTPATIENT
Start: 2025-06-06 | End: 2025-06-07

## 2025-06-06 RX ADMIN — MICONAZOLE NITRATE: 20 CREAM TOPICAL at 20:55

## 2025-06-06 RX ADMIN — METHADONE HYDROCHLORIDE 1 MG: 10 CONCENTRATE ORAL at 21:05

## 2025-06-06 RX ADMIN — DICLOFENAC SODIUM 2 G: 10 GEL TOPICAL at 11:34

## 2025-06-06 RX ADMIN — DICLOFENAC SODIUM 2 G: 10 GEL TOPICAL at 20:55

## 2025-06-06 RX ADMIN — DICLOFENAC SODIUM 2 G: 10 GEL TOPICAL at 15:09

## 2025-06-06 RX ADMIN — INSULIN ASPART 3 UNITS: 100 INJECTION, SOLUTION INTRAVENOUS; SUBCUTANEOUS at 11:34

## 2025-06-06 RX ADMIN — INSULIN ASPART 3 UNITS: 100 INJECTION, SOLUTION INTRAVENOUS; SUBCUTANEOUS at 21:50

## 2025-06-06 RX ADMIN — DEXTROSE, SODIUM CHLORIDE, SODIUM LACTATE, POTASSIUM CHLORIDE, AND CALCIUM CHLORIDE: 5; .6; .31; .03; .02 INJECTION, SOLUTION INTRAVENOUS at 13:00

## 2025-06-06 RX ADMIN — LEVETIRACETAM 250 MG: 100 SOLUTION ORAL at 20:56

## 2025-06-06 RX ADMIN — INSULIN ASPART 6 UNITS: 100 INJECTION, SOLUTION INTRAVENOUS; SUBCUTANEOUS at 02:35

## 2025-06-06 RX ADMIN — LEVETIRACETAM 250 MG: 100 SOLUTION ORAL at 08:49

## 2025-06-06 RX ADMIN — INSULIN ASPART 2 UNITS: 100 INJECTION, SOLUTION INTRAVENOUS; SUBCUTANEOUS at 18:25

## 2025-06-06 RX ADMIN — INSULIN ASPART 3 UNITS: 100 INJECTION, SOLUTION INTRAVENOUS; SUBCUTANEOUS at 15:05

## 2025-06-06 RX ADMIN — DICLOFENAC SODIUM 2 G: 10 GEL TOPICAL at 08:48

## 2025-06-06 RX ADMIN — MICONAZOLE NITRATE: 20 CREAM TOPICAL at 08:48

## 2025-06-06 RX ADMIN — INSULIN ASPART 3 UNITS: 100 INJECTION, SOLUTION INTRAVENOUS; SUBCUTANEOUS at 06:05

## 2025-06-06 ASSESSMENT — ACTIVITIES OF DAILY LIVING (ADL)
ADLS_ACUITY_SCORE: 99
ADLS_ACUITY_SCORE: 103
ADLS_ACUITY_SCORE: 99
ADLS_ACUITY_SCORE: 99
ADLS_ACUITY_SCORE: 103
ADLS_ACUITY_SCORE: 103
ADLS_ACUITY_SCORE: 99
ADLS_ACUITY_SCORE: 103
ADLS_ACUITY_SCORE: 99

## 2025-06-06 NOTE — PROVIDER NOTIFICATION
Provider notified (name): PAULO Mcgovern  Reason for notification: patient refusing to take some po medications (cymbalta, methadone)   Response from provider: aware of situation, can attempt meds again later, PRNs available and starting on continuous fluids

## 2025-06-06 NOTE — PLAN OF CARE
Goal Outcome Evaluation:    Plan of Care Reviewed With: patient, daughter    Overall Patient Progress: no change  Overall Patient Progress: no change    Outcome Evaluation: Assumed care from [id number]. Alert, FOZIA orientation. Able to verbalize needs overnight. Tolerated repositioning Q2 well. Incontinent of bowel and bladder. BG monitored (438, 465, 343, 392, 263) - provider notified, D10 stopped, sliding scale administered as ordered. R PIV intact, SL. Oral intake encouraged, minimal appetite noted. Continue with plan of care.     Candelaria Obrien RN

## 2025-06-06 NOTE — INTERIM SUMMARY
Palliative Care Interim Summary  Reviewed this patient's care with Gold medicine team and unit .  We were initially consulted for assistance with GOC planning.    Most recent event that patient pulled out NG feeding tube    Goals of care are well-established (ideally, hospice care closer to family; near [place] versus other).    Primary issues at this time are related to finding a location for safe discharge that is concordant with family wishes.    Per discussion with primary team, we will sign off at this time; please reconsult if we can be of further assistance.    Thank you.    Luiz Burris MD  Palliative Medicine Consult Team  Text me via [phone]

## 2025-06-06 NOTE — PROGRESS NOTES
Care Management Follow Up    Length of Stay (days): 30  Expected Discharge Date: 06/09/2025  Concerns to be Addressed: discharge planning     Patient plan of care discussed at interdisciplinary rounds: Yes  Anticipated Discharge Disposition: Hospice  Anticipated Discharge Services: None  Anticipated Discharge DME: None  Patient/family educated on Medicare website which has current facility and service quality ratings: yes  Education Provided on the Discharge Plan: Yes  Patient/Family in Agreement with the Plan: yes  Referrals Placed by CM/SW: Hospice  Private pay costs discussed: Not applicable  Discussed  Partnership in Safe Discharge Planning  document with patient/family: No   Handoff Completed: No, handoff not indicated or clinically appropriate  Additional Information: Current disposition plan is long-term care placement with hospice. Writer followed-up on active LTC referrals. Writer spoke with pt's daughter Maria Guadalupe by phone. Writer provided active listening and validation of feelings. Maria Guadalupe continues to be under high stress with limited support from family regarding decision making for her mom. Maria Guadalupe requested referral information, so this writer emailed those to her. Writer managed expectations about LTC placement, which can be quite challenging to secure. Care management team will keep her updated.    Pending LTC Referrals    [facility]erral (Liaison, Consuelo Power; P: [phone]; F: [phone]; E: [email or web address])   6/6: Writer called [place] lianarcisa Cordero to ask about placement in a LTC  of [facility]. She agreed to review and report back.  [facility] can offer a bed. Writer asked specifically about [facility], which family had been interested in.    [facility] ([facility]; Facility Ph: [phone], Facility F: [phone])  [address] [place] [address] [id number] [place] [address] Society- Marisela Nevarez   [address] [place] [address]  Phone: [phone]  Fax: [phone]  6/6: Writer called & LVM for admissions to follow-up on referral.     [facility]   [address] [facility] [place] [address]  Phone: [phone]  Fax: [phone]  6/6: Writer called & LVM for admissions to follow-up on referral.     [facility]  [address] [place] [address]  Phone: [phone]  Fax: [phone]  6/6: Writer called to speak with admissions, who was out for the day. Writer left a VM.     [facility]  [address] [place] [address]  Phone: [phone]  Fax: [phone]  6/6: Writer called & LVM for Bethanie in admissions.     Declined Referrals   [facility] - not able to meet patient's needs   [facility] - acuity too high  [facility]  -  No LTC bed anticipated for many months.    ROBIN Villarreal, [facility]  Clinical   [facility] Acute Care Management  [phone]  Available on Vocera: 7C Med Surg 6554 thru [address]

## 2025-06-06 NOTE — PROGRESS NOTES
[facility]    Medicine Progress Note - Hospitalist Service, GOLD TEAM 6    Date of Admission:  5/7/2025    Updates today:  -Patient intermittently refusing oral medications, continue to offer but no need to force  - Added seizure as indication for lorazepam  - Appreciate palliative care input, appropriate to sign off  - Appreciate social work input, continues to work with family regarding placement    Assessment & Plan   Isabell Matthews is a 64F w/ PMH T1DM c/b diabetic neuropathy, orthostatic hypotension, seizures, vascular dementia, prior CVAs, hypothyroidism, recurrent UTIs, and frequent falls  admitted on 5/7/2025.   She initially presented to the ED for evaluation of stroke-like symptoms. MRI completed showing acute infarcts for which she was not a candidate for advanced therapies. Ongoing admission due to ongoing goals of care discussion between providers and family for disposition. Care conference on 5/29 with decision to transition Isabell to comfort cares and placing referrals for hospice with preference for OLP. Leaving NG access and TF as well as insulin coverage in the interim to allow for additional family to visit from out of town for now.      Goals of care  Comfort cares  Decision made to transition to comfort cares and discharge to hospice facility (hopeful for OLP) Still awaiting additional out of town family including her son and daughter so decision made to continue her tube feeds and insulin until they are able to visit and will be stopped prior to discharge. Anticipate that once these interventions are stopped that she will have days to a couple weeks. OLP denied on 5/30 due to patient not being bed bound however highly suspect this will be the case after nutrition stopped. Given barriers to alternative locations with insurance, plan to have OLP re-evaluate after TF stopped (stopped as below 6/4).   - NG dislodged 6/4 evening   - stop TF   - stop NPH  -  family with concern for medication withdrawal side effects will work on weaning especially her psychiatric medications continue current dose duloxetine per d/w daughter for now (missed dose on 6/6)  - Palliative care or following until 6/6, appropriate to sign off  - Comfort cares (no vitals, labs, etc)  - Methadone 1 mg q12h (missed dose on 6/6)  - Lorazepam solution 0.5 mg q4h PRN      Multifocal acute cerebral infarcts  Hx of multiple CVAs  Hx of seizures  Presented w/ new findings of dysarthria, dysphagia, decreased responsiveness, confusion. CT head negative for acute infarct or hemorrhage. CTA w/ multifocal stenosis involving L RADHA and R MCA which had progressed since 1/16/25. Stroke Neuro team consulted while in the ED. Loaded with keppra. EEG negative. Found to have UTI so concern presentation was recrudescence of prior CVA deficits however MRI brain obtained showing acute infarcts in R corona radiata, precentral gyrus and operculum. Not a candidate for advanced therapies.   - Stopping DAPT, atorvastatin due to comfort cares focus in discussion with family  - Continue keppra 250 mg BID (tolerating oral dosing, though did not take dose morning of 6/6)   -Ativan available as needed seizures     Acute metabolic encephalopathy, multifactorial  Hx of vascular dementia  Acute change in status noted on arrival, possibly 2/2 acute CVA vs metabolic encephalopathy. Likely multifactorial with UTI, dehydration, hyperglycemia, hypercapnia, and hospital environment contributing, as well as newfound acute CVA as above. No significant electrolyte abnormalities. No suspicious meds. TSH 0.14. Some clinical improvement noted. Patient no longer somnolent or encephalopathic, although her cognitive function does not appear intact. Family reports patient is close to baseline.   - Delirium precautions  - Continue melatonin 1mg at bedtime -- stopping once NG removed     NGT removed per patient 6/4 evening  Dysphagia s/p NGT  placement 5/10  Failed initial swallow study. Likely in the setting of acute encephalopathy and acute stroke. SLP consulted, recommended strict NPO. NG placed 5/10 and started on TF. In prior discussions, PEG not within GOC. SLP re-evaluated 5/22 with patient retaining food in mouth despite multiple cues to follow but when she does eventually there is no evidence of aspiration or dysphagia though at high risk for aspiration due to oral retention. Given this, it is unlikely she will be able to maintain adequate PO intake to prevent malnutrition. RD followed for TF input and were tolerated at goal.   - pt self- removed NG 6/4 evening   - Continue pureed diet with thin liquids and 1:1 supervision  - continue to involve family in ongoing discussions      DM1  Hypoglycemia  A1c of 8.8% in 3/2025. Had been NPO due to to dysphagia and now on TF. Labile blood sugars and hypoglycemia noted in setting of prior infection. PTA regimen includes lantus 18 units at bedtime. Updated Endocrine regarding plan to stop TF after family visits. Plan to stop NPH and do basal insulin only. Not on carb coverage.   - stop NPH as no longer on TF (above)  - Endocrine signed off but helping peripherally   - Lantus 8 units daily at 1200 (this will remain her only coverage after TF stopped)  - Medium intensity sliding scale q4h while on TF  - BG checks q4h, hypoglycemia protocol   - PRN D10 available, currently giving 35cc/h D5 LR     Possible stress cardiomyopathy  Echo this admission with LVEF 50-55% with mid ventricular hypokinesis w/ preserved apical and mid segements c/w stress CM. No obvious signs of heart failure. Clinically, she has no s/sx to suggest acutely decompensated HF. Has remained RRR.   - Stopping carvedilol due to comfort cares     Hypothyroidism  Most recent TSH 0.99. Repeat TSH here 0.14. On admission, daughter noted recent medication adjustments (brand synthroid vs generic) therefore may have been over-replaced. Cannot r/o  sick euthyroid state as well.  - stop PTA PO synthroid 88mcg (NG removed)     CKD stage IIIb  Baseline Cr of 1.4-1.6. Cr improved to 1.3-1.4 with IVF. Suspect chronic dehydration contributing.     HTN  HLD  -180 on admission. Can allow for permissive hypertension in setting of acute stroke per Stroke Neuro.  - Stopping medications due to comfort cares     Chronic low back pain  - Diclofenac gel PRN  - Decreasing duloxetine from 20 mg daily to 10 mg daily with plan to stop prior to discharge   - Stopping gabapentin     Closed colles' fracture of L wrist  Occurred in April, saw Ortho 4/22/25, but not deemed a surgical candidate d/t her medical comorbidities. Per Ortho, they wanted her in brace and to be NWB status at L wrist. Repeat XR redemonstrated known comminuted intra-articular fracture of distal left radius. Ortho consulted with recommendations for hand therapy and progressing ROM as tolerate out of brace.   - Coffee cup weight bearing to L wrist.  - Hand therapy if able, ROM as tolerated out of brace     Left wrist rash  Initially felt to be pressure injury from wrist brace. Concern it developed vesicular appearance however VZV negative.   - WOCN consulted     Hypernatremia, resolved  Free water deficit in setting of dysphagia and NPO status. Resolved with hypotonic fluids.      UTI, recurrent, resolved  Urine culture from 5/1/25 grew pan-susceptible E.Coli. Afebrile on arrival, WBC normal. No other obvious source of infection. Repeat UA/UC negative. Completed 7 days of IV Ceftriaxone on 5/12.          Diet: Adult Formula Drip Feeding: Continuous HomeRun Standard 1.4; Nasogastric tube; Goal Rate: 45 ( can start at goal ); mL/hr; Initiate at 10ml/hr and advance by 10ml Q8H as tolerated.; Do not advance tube feeding rate unless K+ is = or > 3.0, Mg++...  Pureed Diet (level 4) Thin Liquids (level 0)    DVT Prophylaxis: Not indicated, hospice   Parker Catheter: Not present  Lines: None     Cardiac  Monitoring: None  Code Status: No CPR- Do NOT Intubate      Clinically Significant Risk Factors               # Hypoalbuminemia: Lowest albumin = 2.9 g/dL at 5/17/2025  6:01 AM, will monitor as appropriate     # Hypertension: Noted on problem list     # Dementia: noted on problem list         # Severe Malnutrition: based on nutrition assessment and treatment provided per dietitian's recommendations.    # Financial/Environmental Concerns:           Social Drivers of Health    Food Insecurity: Unknown (5/17/2025)    Food Insecurity     Within the past 12 months, did you worry that your food would run out before you got money to buy more?: Patient unable to answer     Within the past 12 months, did the food you bought just not last and you didn t have money to get more?: Patient unable to answer   Depression: At risk (4/2/2025)    PHQ-2     PHQ-2 Score: 4   Housing Stability: Unknown (5/17/2025)    Housing Stability     Do you have housing? : Patient unable to answer     Are you worried about losing your housing?: Patient unable to answer   Tobacco Use: Medium Risk (4/24/2025)    Patient History     Smoking Tobacco Use: Former     Smokeless Tobacco Use: Never   Financial Resource Strain: Unknown (5/17/2025)    Financial Resource Strain     Within the past 12 months, have you or your family members you live with been unable to get utilities (heat, electricity) when it was really needed?: Patient unable to answer   Transportation Needs: Unknown (5/17/2025)    Transportation Needs     Within the past 12 months, has lack of transportation kept you from medical appointments, getting your medicines, non-medical meetings or appointments, work, or from getting things that you need?: Patient unable to answer   Interpersonal Safety: Unknown (5/17/2025)    Interpersonal Safety     Do you feel physically and emotionally safe where you currently live?: Patient unable to answer     Within the past 12 months, have you been hit,  slapped, kicked or otherwise physically hurt by someone?: Patient unable to answer     Within the past 12 months, have you been humiliated or emotionally abused in other ways by your partner or ex-partner?: Patient unable to answer          Disposition Plan     Medically Ready for Discharge: Ready Now           The patient's care was discussed with the Attending Physician, Dr. Shearer, Bedside Nurse, Care Coordinator/, Patient, Patient's Family, and palliative care Team.    Deepali Mcgovern PA-C  Hospitalist Service, [address]  Securely message with [employer] (more info)  Text page via [facility] Paging/Directory   See signed in provider for up to date coverage information  ______________________________________________________________________    Interval History   Lisa is seen in her room in the company of her bedside nurse who notes that in general patient's mental status has waxed and waned.     Lisa is able to tell me her name, her daughter's name, that she is in a hospital.  Patient endorses that she is hungry.  She does not endorse having any pain or nausea.  She has not been out of bed recently.  She continues to have voids and BMs.    Physical Exam   Vital Signs:                    Weight: 129 lbs 10.09 oz  BP (!) 142/64 (BP Location: Right arm)   Pulse 84   Temp 97.9  F (36.6  C) (Oral)   Resp 16   Wt 58.8 kg (129 lb 10.1 oz)   SpO2 96%   BMI 22.97 kg/m    Generalized appearance: A&O to person and place, laying with HOB 30 degrees.  Appears comfortable.  NAD  HEENT: NC, AT, pupils equal and round, sclera anicteric, EOMI  CV: RRR, no m/r/g  PULM: CTAB, non-labored breathing on RA  GI:  soft, NT, ND  Extremities: no edema, + PT and radial pulses  MSK: moves all extremities, no gross deformities  SKIN: CDI, noncyanotic, anicteric      Medical Decision Making       50 MINUTES SPENT BY ME on the date of service doing chart review, history,  exam, documentation & further activities per the note.      Data         Imaging results reviewed over the past 24 hrs:   No results found for this or any previous visit (from the past 24 hours).

## 2025-06-06 NOTE — PLAN OF CARE
Shift Hours: 0700 - 1900    Assessment:  Body systems that were not at patient's baseline Cognitive/Behavioral/Neuro, Gastrointestinal, Genitourinary, Skin, and Musculoskeletal. Focused body system assessments documented in flowsheets.        Activity     Fall Risk Score: 95   Bed alarm on? Yes     Activity Assistance Provided: assistance, 2 people      Assistive Device Utilized: gait belt    Pain: patient reporting little to no pain this shift     Labs/RN Managed Protocols: no RN managed labs, BG Q4h (248,251,200)    Lines/Drains: PIV infusing D5LR continuously at 35ml/hr    Nutrition: pureed diet w/ poor appetite     Goal Outcome Evaluation:      Plan of Care Reviewed With: patient    Overall Patient Progress: no changeOverall Patient Progress: no change    Outcome Evaluation: No acute events this shift. Patient remains on comfort cares and appears comfortable. Patient more talkative this shift. Refused all po meds ex able to tolerate AM keppra. Appears stable on RA. Up in recliner chair this evening. Q1h rounding completed, call light w/ in reach and able to make needs known.    Barriers to Discharge:   LTC/hospice placement

## 2025-06-07 LAB
GLUCOSE BLDC GLUCOMTR-MCNC: 146 MG/DL (ref 70–99)
GLUCOSE BLDC GLUCOMTR-MCNC: 148 MG/DL (ref 70–99)
GLUCOSE BLDC GLUCOMTR-MCNC: 199 MG/DL (ref 70–99)
GLUCOSE BLDC GLUCOMTR-MCNC: 229 MG/DL (ref 70–99)
GLUCOSE BLDC GLUCOMTR-MCNC: 237 MG/DL (ref 70–99)
GLUCOSE BLDC GLUCOMTR-MCNC: 301 MG/DL (ref 70–99)
GLUCOSE BLDC GLUCOMTR-MCNC: 78 MG/DL (ref 70–99)

## 2025-06-07 PROCEDURE — 250N000013 HC RX MED GY IP 250 OP 250 PS 637: Performed by: INTERNAL MEDICINE

## 2025-06-07 PROCEDURE — 999N000248 HC STATISTIC IV INSERT WITH US BY RN

## 2025-06-07 PROCEDURE — 250N000009 HC RX 250: Performed by: INTERNAL MEDICINE

## 2025-06-07 PROCEDURE — 99232 SBSQ HOSP IP/OBS MODERATE 35: CPT | Mod: FS | Performed by: PEDIATRICS

## 2025-06-07 PROCEDURE — 99207 PR APP CREDIT; MD BILLING SHARED VISIT: CPT | Mod: FS | Performed by: PHYSICIAN ASSISTANT

## 2025-06-07 PROCEDURE — 258N000003 HC RX IP 258 OP 636: Performed by: PHYSICIAN ASSISTANT

## 2025-06-07 PROCEDURE — 250N000013 HC RX MED GY IP 250 OP 250 PS 637: Performed by: PHYSICIAN ASSISTANT

## 2025-06-07 PROCEDURE — 258N000001 HC RX 258

## 2025-06-07 PROCEDURE — 120N000002 HC R&B MED SURG/OB UMMC

## 2025-06-07 RX ORDER — SODIUM CHLORIDE, SODIUM LACTATE, POTASSIUM CHLORIDE, CALCIUM CHLORIDE 600; 310; 30; 20 MG/100ML; MG/100ML; MG/100ML; MG/100ML
INJECTION, SOLUTION INTRAVENOUS CONTINUOUS
Status: DISCONTINUED | OUTPATIENT
Start: 2025-06-07 | End: 2025-06-08

## 2025-06-07 RX ADMIN — OXYCODONE HYDROCHLORIDE 5 MG: 100 SOLUTION ORAL at 16:42

## 2025-06-07 RX ADMIN — MICONAZOLE NITRATE: 20 CREAM TOPICAL at 21:02

## 2025-06-07 RX ADMIN — METHADONE HYDROCHLORIDE 1 MG: 10 CONCENTRATE ORAL at 09:09

## 2025-06-07 RX ADMIN — INSULIN ASPART 2 UNITS: 100 INJECTION, SOLUTION INTRAVENOUS; SUBCUTANEOUS at 02:18

## 2025-06-07 RX ADMIN — INSULIN ASPART 4 UNITS: 100 INJECTION, SOLUTION INTRAVENOUS; SUBCUTANEOUS at 13:35

## 2025-06-07 RX ADMIN — INSULIN ASPART 2 UNITS: 100 INJECTION, SOLUTION INTRAVENOUS; SUBCUTANEOUS at 06:28

## 2025-06-07 RX ADMIN — LEVETIRACETAM 250 MG: 100 SOLUTION ORAL at 09:09

## 2025-06-07 RX ADMIN — MICONAZOLE NITRATE: 20 CREAM TOPICAL at 09:09

## 2025-06-07 RX ADMIN — Medication 10 MG: at 09:09

## 2025-06-07 RX ADMIN — SODIUM CHLORIDE, SODIUM LACTATE, POTASSIUM CHLORIDE, AND CALCIUM CHLORIDE: .6; .31; .03; .02 INJECTION, SOLUTION INTRAVENOUS at 15:00

## 2025-06-07 RX ADMIN — DEXTROSE MONOHYDRATE 25 ML: 25 INJECTION, SOLUTION INTRAVENOUS at 21:29

## 2025-06-07 RX ADMIN — OXYCODONE HYDROCHLORIDE 5 MG: 100 SOLUTION ORAL at 09:05

## 2025-06-07 RX ADMIN — LEVETIRACETAM 250 MG: 100 SOLUTION ORAL at 21:02

## 2025-06-07 RX ADMIN — DICLOFENAC SODIUM 2 G: 10 GEL TOPICAL at 13:38

## 2025-06-07 RX ADMIN — DICLOFENAC SODIUM 2 G: 10 GEL TOPICAL at 21:02

## 2025-06-07 RX ADMIN — DICLOFENAC SODIUM 2 G: 10 GEL TOPICAL at 09:09

## 2025-06-07 RX ADMIN — INSULIN ASPART 2 UNITS: 100 INJECTION, SOLUTION INTRAVENOUS; SUBCUTANEOUS at 09:45

## 2025-06-07 RX ADMIN — METHADONE HYDROCHLORIDE 1 MG: 10 CONCENTRATE ORAL at 21:02

## 2025-06-07 ASSESSMENT — ACTIVITIES OF DAILY LIVING (ADL)
ADLS_ACUITY_SCORE: 103
ADLS_ACUITY_SCORE: 95
ADLS_ACUITY_SCORE: 103
ADLS_ACUITY_SCORE: 95
ADLS_ACUITY_SCORE: 103
ADLS_ACUITY_SCORE: 95
ADLS_ACUITY_SCORE: 103

## 2025-06-07 NOTE — PLAN OF CARE
Goal Outcome Evaluation:    Shift Hours: 1900 - 0700    Assessment:  Body systems that were not at patient's baseline Cognitive/Behavioral/Neuro, Gastrointestinal, Musculoskeletal, and Safety. Focused body system assessments documented in flowsheets.        Activity     Fall Risk Score: 95   Bed alarm on? Yes     Activity Assistance Provided: assistance, 2 people      Assistive Device Utilized: gait belt    Pain: Pt reported occasional back pain.    Labs/RN Managed Protocols: BG Q4H    Lines/Drains: PIV     Nutrition: Total feed, pureed    Goal Outcome Evaluation    Plan of Care Reviewed With: patient  Overall Patient Progress: no change  Outcome Evaluation: Patient continued comfort care regimen. Able to call and communicate some needs verbally. Rounded on Q1H. Able to take PO meds this shift and finished dinner with assistance. BG monitored and IVF continued. PIV removed by patient, replaced, MD updated, see mar. SS utilized per order and BG, covered. Bed alarm on, incontinent and changed frequently. Turned self in bed, moved around quite a bit. 1A to chair and commode. CPOC.    Barriers to Discharge:     TCU/Hospice planning

## 2025-06-07 NOTE — PLAN OF CARE
Goal Outcome Evaluation:  On comfort cares  Pt opens eyes spontaneously; but non verbal. Pt nodes head at times when asked if she is in pain or not. Bs has been running in the 200's and earlier today 301; IV fluid changed to LR from D5 LR; Bs cover per sliding scale  and pt is on lantus. Pt refused to eat. PRN oxy 5 mg solution oral given x2  for non verbal pain indicator  pain with relief.incontinent of smear BM x3. Incontinent of urine. Pt does not make her needs known; pt has been checked frequently.Bed alarm is maintained for safety.  Continue with POC

## 2025-06-07 NOTE — PROGRESS NOTES
[facility]    Medicine Progress Note - Hospitalist Service, GOLD TEAM 6    Date of Admission:  5/7/2025    Updates today:  -Appreciate excellent  Jason working on placement  - If unable to procure placement per family's wishes we will move to care conference on Monday  - Continue to work on patient comfort  - Recently patient has been taking her medications that include Keppra and methadone  - Glucose trending high, change IV fluids to LR, discontinue D5    Assessment & Plan   Isabell Matthews is a 64F w/ PMH T1DM c/b diabetic neuropathy, orthostatic hypotension, seizures, vascular dementia, prior CVAs, hypothyroidism, recurrent UTIs, and frequent falls  admitted on 5/7/2025.   She initially presented to the ED for evaluation of stroke-like symptoms. MRI completed showing acute infarcts for which she was not a candidate for advanced therapies. Ongoing admission due to ongoing goals of care discussion between providers and family for disposition. Care conference on 5/29 with decision to transition Isabell to comfort cares and placing referrals for hospice with preference for OLP. Leaving NG access and TF as well as insulin coverage in the interim to allow for additional family to visit from out of town for now.      Goals of care  Comfort cares  Decision made to transition to comfort cares and discharge to hospice facility (hopeful for OLP) Still awaiting additional out of town family including her son and daughter so decision made to continue her tube feeds and insulin until they are able to visit and will be stopped prior to discharge. Anticipate that once these interventions are stopped that she will have days to a couple weeks. OLP denied on 5/30 due to patient not being bed bound however highly suspect this will be the case after nutrition stopped. Given barriers to alternative locations with insurance, plan to have OLP re-evaluate after TF stopped (stopped  as below 6/4).   - NG dislodged 6/4 evening   - stop TF   - stop NPH  - family with concern for medication withdrawal side effects will work on weaning especially her psychiatric medications continue current dose duloxetine per d/w daughter for now (missed dose on 6/6)  - Palliative care or followed until 6/6, appropriate to sign off  - Comfort cares (no vitals, labs, etc)  - Methadone 1 mg q12h (missed dose on 6/6)  - Lorazepam solution 0.5 mg q4h PRN      Multifocal acute cerebral infarcts  Hx of multiple CVAs  Hx of seizures  Presented w/ new findings of dysarthria, dysphagia, decreased responsiveness, confusion. CT head negative for acute infarct or hemorrhage. CTA w/ multifocal stenosis involving L RADHA and R MCA which had progressed since 1/16/25. Stroke Neuro team consulted while in the ED. Loaded with keppra. EEG negative. Found to have UTI so concern presentation was recrudescence of prior CVA deficits however MRI brain obtained showing acute infarcts in R corona radiata, precentral gyrus and operculum. Not a candidate for advanced therapies.   - Stopping DAPT, atorvastatin due to comfort cares focus in discussion with family  - Continue keppra 250 mg BID (tolerating oral dosing, though did not take dose morning of 6/6)   -Ativan available as needed seizures     Acute metabolic encephalopathy, multifactorial  Hx of vascular dementia  Acute change in status noted on arrival, possibly 2/2 acute CVA vs metabolic encephalopathy. Likely multifactorial with UTI, dehydration, hyperglycemia, hypercapnia, and hospital environment contributing, as well as newfound acute CVA as above. No significant electrolyte abnormalities. No suspicious meds. TSH 0.14. Some clinical improvement noted. Patient no longer somnolent or encephalopathic, although her cognitive function does not appear intact. Family reports patient is close to baseline.   - Delirium precautions  - Continue melatonin 1mg at bedtime -- stopping once NG  removed     NGT removed per patient 6/4 evening  Dysphagia s/p NGT placement 5/10  Failed initial swallow study. Likely in the setting of acute encephalopathy and acute stroke. SLP consulted, recommended strict NPO. NG placed 5/10 and started on TF. In prior discussions, PEG not within GOC. SLP re-evaluated 5/22 with patient retaining food in mouth despite multiple cues to follow but when she does eventually there is no evidence of aspiration or dysphagia though at high risk for aspiration due to oral retention. Given this, it is unlikely she will be able to maintain adequate PO intake to prevent malnutrition. RD followed for TF input and were tolerated at goal.   - pt self- removed NG 6/4 evening   - Continue pureed diet with thin liquids and 1:1 supervision  - continue to involve family in ongoing discussions      DM1  Hypoglycemia  A1c of 8.8% in 3/2025. Had been NPO due to to dysphagia and now on TF. Labile blood sugars and hypoglycemia noted in setting of prior infection. PTA regimen includes lantus 18 units at bedtime. Updated Endocrine regarding plan to stop TF after family visits. Plan to stop NPH and do basal insulin only. Not on carb coverage.   Recent Labs   Lab 06/07/25  1334 06/07/25  0918 06/07/25  0626 06/07/25  0217 06/06/25  2149 06/06/25  1758   * 237* 229* 199* 240* 200*     - stop NPH as no longer on TF (above)  - Endocrine signed off but helping peripherally   - Lantus 8 units daily at 1200 (this will remain her only coverage after TF stopped)  - Medium intensity sliding scale q4h while on TF  - BG checks q4h, hypoglycemia protocol   - PRN D10 available-discontinue D5/LR     Possible stress cardiomyopathy  Echo this admission with LVEF 50-55% with mid ventricular hypokinesis w/ preserved apical and mid segements c/w stress CM. No obvious signs of heart failure. Clinically, she has no s/sx to suggest acutely decompensated HF. Has remained RRR.   - Stopping carvedilol due to comfort cares      Hypothyroidism  Most recent TSH 0.99. Repeat TSH here 0.14. On admission, daughter noted recent medication adjustments (brand synthroid vs generic) therefore may have been over-replaced. Cannot r/o sick euthyroid state as well.  - stop PTA PO synthroid 88mcg (NG removed)     CKD stage IIIb  Baseline Cr of 1.4-1.6. Cr improved to 1.3-1.4 with IVF. Suspect chronic dehydration contributing.     HTN  HLD  -180 on admission. Can allow for permissive hypertension in setting of acute stroke per Stroke Neuro.  - Stopping medications due to comfort cares     Chronic low back pain  - Diclofenac gel PRN  - Decreasing duloxetine from 20 mg daily to 10 mg daily with plan to stop prior to discharge   - Stopping gabapentin     Closed colles' fracture of L wrist  Occurred in April, saw Ortho 4/22/25, but not deemed a surgical candidate d/t her medical comorbidities. Per Ortho, they wanted her in brace and to be NWB status at L wrist. Repeat XR redemonstrated known comminuted intra-articular fracture of distal left radius. Ortho consulted with recommendations for hand therapy and progressing ROM as tolerate out of brace.   - Coffee cup weight bearing to L wrist.  - Hand therapy if able, ROM as tolerated out of brace     Left wrist rash  Initially felt to be pressure injury from wrist brace. Concern it developed vesicular appearance however VZV negative.   - WOCN consulted     Hypernatremia, resolved  Free water deficit in setting of dysphagia and NPO status. Resolved with hypotonic fluids.      UTI, recurrent, resolved  Urine culture from 5/1/25 grew pan-susceptible E.Coli. Afebrile on arrival, WBC normal. No other obvious source of infection. Repeat UA/UC negative. Completed 7 days of IV Ceftriaxone on 5/12.          Diet: Adult Formula Drip Feeding: Continuous [employer] Standard 1.4; Nasogastric tube; Goal Rate: 45 ( can start at goal ); mL/hr; Initiate at 10ml/hr and advance by 10ml Q8H as tolerated.; Do not advance  tube feeding rate unless K+ is = or > 3.0, Mg++...  Pureed Diet (level 4) Thin Liquids (level 0)    DVT Prophylaxis: Not indicated, hospice cares  Parker Catheter: Not present  Lines: None     Cardiac Monitoring: None  Code Status: No CPR- Do NOT Intubate      Clinically Significant Risk Factors               # Hypoalbuminemia: Lowest albumin = 2.9 g/dL at 5/17/2025  6:01 AM, will monitor as appropriate     # Hypertension: Noted on problem list     # Dementia: noted on problem list         # Severe Malnutrition: based on nutrition assessment and treatment provided per dietitian's recommendations.    # Financial/Environmental Concerns:           Social Drivers of Health    Food Insecurity: Unknown (5/17/2025)    Food Insecurity     Within the past 12 months, did you worry that your food would run out before you got money to buy more?: Patient unable to answer     Within the past 12 months, did the food you bought just not last and you didn t have money to get more?: Patient unable to answer   Depression: At risk (4/2/2025)    PHQ-2     PHQ-2 Score: 4   Housing Stability: Unknown (5/17/2025)    Housing Stability     Do you have housing? : Patient unable to answer     Are you worried about losing your housing?: Patient unable to answer   Tobacco Use: Medium Risk (4/24/2025)    Patient History     Smoking Tobacco Use: Former     Smokeless Tobacco Use: Never   Financial Resource Strain: Unknown (5/17/2025)    Financial Resource Strain     Within the past 12 months, have you or your family members you live with been unable to get utilities (heat, electricity) when it was really needed?: Patient unable to answer   Transportation Needs: Unknown (5/17/2025)    Transportation Needs     Within the past 12 months, has lack of transportation kept you from medical appointments, getting your medicines, non-medical meetings or appointments, work, or from getting things that you need?: Patient unable to answer   Interpersonal Safety:  "Unknown (5/17/2025)    Interpersonal Safety     Do you feel physically and emotionally safe where you currently live?: Patient unable to answer     Within the past 12 months, have you been hit, slapped, kicked or otherwise physically hurt by someone?: Patient unable to answer     Within the past 12 months, have you been humiliated or emotionally abused in other ways by your partner or ex-partner?: Patient unable to answer          Disposition Plan     Medically Ready for Discharge: Ready Now           The patient's care was discussed with the Bedside Nurse and Care Coordinator/.    Deepali Mcgovern PA-C  Hospitalist Service, GOLD TEAM [address] [place] [address]  Securely message with VMob (more info)  Text page via [facility] Paging/Directory   See signed in provider for up to date coverage information  ______________________________________________________________________    Interval History   Isabell is seen in her hospital room and appears somewhat uncomfortable.  She does not make any verbal responses but does appear to shake and nod her head in response to questions.  Her responses are not easily discernible.  She appears to endorse pain she appears to shake her head \"no\" when asked about nausea.  She does not give any other clear responses to questions.    Physical Exam   Vital Signs:                    Weight: 129 lbs 10.09 oz  Generalized appearance: Alert unable to assess orientation status, no verbal response.  Lying supine with bilateral lower extremities in hip and knee flexion appears somewhat uncomfortable.  NAD  HEENT: NC, AT, pupils equal and round, sclera anicteric, EOMI  PULM: , non-labored breathing on RA  GI:  soft, NT, ND  Extremities: no edema, + PT and radial pulses  MSK: moves all extremities, no gross deformities  SKIN: CDI, noncyanotic, anicteric    Medical Decision Making       30 MINUTES SPENT BY ME on the date of service doing chart review, " history, exam, documentation & further activities per the note.      Data

## 2025-06-07 NOTE — PROGRESS NOTES
Care Management Follow Up    Length of Stay (days): 31  Expected Discharge Date: 06/09/2025  Concerns to be Addressed: discharge planning     Patient plan of care discussed at interdisciplinary rounds: Yes  Anticipated Discharge Disposition: Hospice  Anticipated Discharge Services: None  Anticipated Discharge DME: None  Patient/family educated on Medicare website which has current facility and service quality ratings: yes  Education Provided on the Discharge Plan: Yes  Patient/Family in Agreement with the Plan: yes  Referrals Placed by CM/SW: Hospice  Private pay costs discussed: Not applicable  Discussed  Partnership in Safe Discharge Planning  document with patient/family: Yes   Handoff Completed: No, handoff not indicated or clinically appropriate  Additional Information: Per Gold 6 Provider rounds, the patient remains medically ready for discharge. Pt is DNR DNI on comfort cares. Current disposition plan is for Long-term care with community hospice. Family has expressed preference for placement in [facility] to be closer to family. Referrals have been initiated and pt has one accepting facility - [facility]. Family dissatisfied with this location and wanting different placement. If pt does not secure alternative placement by Monday AM, [facility] Provider has requested a care conference Monday PM.    Writer received some feedback on referrals, as listed below. Additional LTC follow-up will occur on Monday when facility admissions teams are working.    Pending LTC Referrals     [facility] Global Referral (Liaison, Consuelo Power; P: [phone]; F: [phone]; E: [email or web address]) - [facility]   6/6: Writer called Sundance lianarcisa Cordero to ask about placement in a LTC  of the [facility]. She agreed to review and report back. [facility] can offer a LTC bed. Writer asked specifically about [facility], which family had been interested in, but did not receive  a response.     [facility]   [address] [place] [address]  Phone: [phone]  Fax: [phone]  6/6: Writer called & LVM for admissions to follow-up on referral.     [facility]  [address] [place] [address]  Phone: [phone]  Fax: [phone]  6/6: Writer called to speak with admissions, who was out for the day. Writer left a VM.     [facility]  [address] [place] [address]  Phone: [phone]  Fax: [phone]  6/6: Writer called & LVM for Bethanie in admissions.     Declined Referrals   [facility] - not able to meet patient's needs   [facility] - acuity too high  [facility]  -  No LTC bed anticipated for many months.  [facility] - Bed not available.    Next Steps: Handoff to weekday ROBIN Culp, [facility]  Clinical   [facility] Acute Care Management  [phone]  Available on Vocera: 7C Med Surg [phone] thru 0690 KINZA

## 2025-06-08 VITALS
DIASTOLIC BLOOD PRESSURE: 64 MMHG | BODY MASS INDEX: 20.02 KG/M2 | WEIGHT: 113 LBS | HEART RATE: 84 BPM | OXYGEN SATURATION: 96 % | TEMPERATURE: 97.9 F | RESPIRATION RATE: 16 BRPM | SYSTOLIC BLOOD PRESSURE: 142 MMHG

## 2025-06-08 LAB
GLUCOSE BLDC GLUCOMTR-MCNC: 108 MG/DL (ref 70–99)
GLUCOSE BLDC GLUCOMTR-MCNC: 155 MG/DL (ref 70–99)
GLUCOSE BLDC GLUCOMTR-MCNC: 45 MG/DL (ref 70–99)
GLUCOSE BLDC GLUCOMTR-MCNC: 92 MG/DL (ref 70–99)
GLUCOSE BLDC GLUCOMTR-MCNC: 98 MG/DL (ref 70–99)

## 2025-06-08 PROCEDURE — 250N000011 HC RX IP 250 OP 636: Performed by: PHYSICIAN ASSISTANT

## 2025-06-08 PROCEDURE — 99232 SBSQ HOSP IP/OBS MODERATE 35: CPT | Mod: FS | Performed by: PEDIATRICS

## 2025-06-08 PROCEDURE — 120N000002 HC R&B MED SURG/OB UMMC

## 2025-06-08 PROCEDURE — 250N000013 HC RX MED GY IP 250 OP 250 PS 637: Performed by: PHYSICIAN ASSISTANT

## 2025-06-08 PROCEDURE — 250N000013 HC RX MED GY IP 250 OP 250 PS 637: Performed by: INTERNAL MEDICINE

## 2025-06-08 PROCEDURE — 99207 PR APP CREDIT; MD BILLING SHARED VISIT: CPT | Mod: FS | Performed by: PHYSICIAN ASSISTANT

## 2025-06-08 PROCEDURE — 258N000001 HC RX 258

## 2025-06-08 PROCEDURE — 250N000009 HC RX 250: Performed by: INTERNAL MEDICINE

## 2025-06-08 RX ORDER — DEXTROSE, SODIUM CHLORIDE, SODIUM LACTATE, POTASSIUM CHLORIDE, AND CALCIUM CHLORIDE 5; .6; .31; .03; .02 G/100ML; G/100ML; G/100ML; G/100ML; G/100ML
INJECTION, SOLUTION INTRAVENOUS CONTINUOUS
Status: DISCONTINUED | OUTPATIENT
Start: 2025-06-08 | End: 2025-06-11

## 2025-06-08 RX ADMIN — MICONAZOLE NITRATE: 20 CREAM TOPICAL at 09:09

## 2025-06-08 RX ADMIN — INSULIN ASPART 1 UNITS: 100 INJECTION, SOLUTION INTRAVENOUS; SUBCUTANEOUS at 11:13

## 2025-06-08 RX ADMIN — MICONAZOLE NITRATE: 20 CREAM TOPICAL at 21:05

## 2025-06-08 RX ADMIN — LEVETIRACETAM 250 MG: 100 SOLUTION ORAL at 21:05

## 2025-06-08 RX ADMIN — DICLOFENAC SODIUM 2 G: 10 GEL TOPICAL at 21:05

## 2025-06-08 RX ADMIN — DEXTROSE MONOHYDRATE 25 ML: 25 INJECTION, SOLUTION INTRAVENOUS at 02:11

## 2025-06-08 RX ADMIN — DEXTROSE, SODIUM CHLORIDE, SODIUM LACTATE, POTASSIUM CHLORIDE, AND CALCIUM CHLORIDE: 5; .6; .31; .03; .02 INJECTION, SOLUTION INTRAVENOUS at 11:13

## 2025-06-08 RX ADMIN — DICLOFENAC SODIUM 2 G: 10 GEL TOPICAL at 09:29

## 2025-06-08 RX ADMIN — METHADONE HYDROCHLORIDE 1 MG: 10 CONCENTRATE ORAL at 21:33

## 2025-06-08 ASSESSMENT — ACTIVITIES OF DAILY LIVING (ADL)
ADLS_ACUITY_SCORE: 101
ADLS_ACUITY_SCORE: 95
ADLS_ACUITY_SCORE: 103
ADLS_ACUITY_SCORE: 101
ADLS_ACUITY_SCORE: 95
ADLS_ACUITY_SCORE: 101
ADLS_ACUITY_SCORE: 95
ADLS_ACUITY_SCORE: 101
ADLS_ACUITY_SCORE: 95
ADLS_ACUITY_SCORE: 95
ADLS_ACUITY_SCORE: 101
ADLS_ACUITY_SCORE: 95
ADLS_ACUITY_SCORE: 95
ADLS_ACUITY_SCORE: 101
ADLS_ACUITY_SCORE: 95
ADLS_ACUITY_SCORE: 101
ADLS_ACUITY_SCORE: 101

## 2025-06-08 NOTE — PROGRESS NOTES
Care Management Follow Up    Length of Stay (days): 32  Expected Discharge Date: 06/10/2025  Concerns to be Addressed: discharge planning, coping/stress, care coordination/care conferences, decision making, utilization management     Patient plan of care discussed at interdisciplinary rounds: Yes  Anticipated Discharge Disposition: Long Term Care, Hospice  Anticipated Discharge Services: None  Anticipated Discharge DME: None  Patient/family educated on Medicare website which has current facility and service quality ratings: yes  Education Provided on the Discharge Plan: Yes  Patient/Family in Agreement with the Plan: yes  Referrals Placed by CM/SW: Post Acute Facilities, Hospice  Private pay costs discussed: Not applicable  Discussed  Partnership in Safe Discharge Planning  document with patient/family: No   Handoff Completed: No, handoff not indicated or clinically appropriate  Additional Information: Per Gold 6 Provider, this patient remains medically ready for discharge. Current disposition plan is long-term care placement with community hospice. Family preference is placement in [facility]. Referrals remain in pending status and there are no LTC facilities in that area that have indicated they could clinically accept the pt yet. Additional follow-up will need to be completed on Monday. Following chart review today, this writer noted concern that there are patient care orders for 1:1 supervision during PO intake & carb counting. SNFs and hospice homes are unable to accommodate staffing to support this. Gold 6 Provider discontinued these patient care orders.    Writer called and spoke with pt's daughter Mel. She continues to want to pursue the LTCs in [facility] with her #1 choice being [facility] in [place], [place]. She also reported wanting to keep the patient's placement options open. She was in agreement with a re-referral to [facility].    Pending  Referrals:    [facility] (Ph: [phone]; F: [phone])  [address] [place] [address]   6/8: New referral submitted.     [facility] Global Referral (Liaison, Consuelo Power; P: [phone]; F: [phone]; E: [email or web address] ([email or web address])) - [facility]   6/8: Weekend liaison sent pt's referral packet to [facility] for clinical review. Currently their sister facility [facility] could offer LTC placement.     [facility]   [address] [place] [address]  Phone: [phone]  Fax: [phone]  6/6: Writer called & LVM for admissions to follow-up on referral.     [facility]  [address] [place] [address]  Phone: [phone]  Fax: [phone]  6/6: Writer called to speak with admissions, who was out for the day. Writer left a VM.     [facility]  [address] [place] [address]  Phone: [phone]  Fax: [phone]  6/6: Writer called & LVM for Bethanie in admissions.     Declined Referrals   [facility] - not able to meet patient's needs   [facility] - acuity too high  [facility]  -  No LTC bed anticipated for many months.  [facility] - Bed not available.    Next Steps:  [] Follow-up with [facility] Monday PM  [] Follow-up on LTC referrals    ROBIN Villarreal, ANNE MARIE  Weekend Covering   [facility] Acute Care Management  [phone]  Searchable on Vocera: 7C Med Surg 7401 thru 7418 SW, 7C Med Surg 4992 thru 7521 SW

## 2025-06-08 NOTE — PROGRESS NOTES
[facility]    Medicine Progress Note - Hospitalist Service, GOLD TEAM 6    Date of Admission:  5/7/2025    Updates today:  - Appreciate social work input, facilities are requiring that patient be off of one-to-one feeding assistance and carb coverage, discussed the stipulations with patient's daughter and she is agreeable to trialing off of these measures  - Potential care conference tomorrow pending decisions from facilities  - Continues to intermittently spit out medications, appreciate nursing cares, provide medications as able  -  PPS score appears to be consistent with 20% as patient is bedbound, only taking a few bites of food, is confused and is total care    Assessment & Plan   Isabell Matthews is a 64F w/ PMH T1DM c/b diabetic neuropathy, orthostatic hypotension, seizures, vascular dementia, prior CVAs, hypothyroidism, recurrent UTIs, and frequent falls  admitted on 5/7/2025.   She initially presented to the ED for evaluation of stroke-like symptoms. MRI completed showing acute infarcts for which she was not a candidate for advanced therapies. Ongoing admission due to ongoing goals of care discussion between providers and family for disposition. Care conference on 5/29 with decision to transition Isabell to comfort cares and placing referrals for hospice with preference for OLP. Leaving NG access and TF as well as insulin coverage in the interim to allow for additional family to visit from out of town for now.      Goals of care  Comfort cares  Decision made to transition to comfort cares and discharge to hospice facility (hopeful for OLP) Still awaiting additional out of town family including her son and daughter so decision made to continue her tube feeds and insulin until they are able to visit and will be stopped prior to discharge. Anticipate that once these interventions are stopped that she will have days to a couple weeks. OLP denied on 5/30 due to patient not  being bed bound however highly suspect this will be the case after nutrition stopped. Given barriers to alternative locations with insurance, plan to have OLP re-evaluate after TF stopped (stopped as below 6/4).   - NG dislodged 6/4 evening   - stop TF   - stop NPH  - family with concern for medication withdrawal side effects will work on weaning especially her psychiatric medications continue current dose duloxetine per d/w daughter for now (missed dose on 6/6)  - Palliative care or followed until 6/6, appropriate to sign off  - Comfort cares (no vitals, labs, etc)  - Methadone 1 mg q12h (missed dose on 6/6)  - Lorazepam solution 0.5 mg q4h PRN      Multifocal acute cerebral infarcts  Hx of multiple CVAs  Hx of seizures  Presented w/ new findings of dysarthria, dysphagia, decreased responsiveness, confusion. CT head negative for acute infarct or hemorrhage. CTA w/ multifocal stenosis involving L RADHA and R MCA which had progressed since 1/16/25. Stroke Neuro team consulted while in the ED. Loaded with keppra. EEG negative. Found to have UTI so concern presentation was recrudescence of prior CVA deficits however MRI brain obtained showing acute infarcts in R corona radiata, precentral gyrus and operculum. Not a candidate for advanced therapies.   - Stopping DAPT, atorvastatin due to comfort cares focus in discussion with family  - Continue keppra 250 mg BID (tolerating oral dosing, though did not take dose morning of 6/6)   -Ativan available as needed seizures     Acute metabolic encephalopathy, multifactorial  Hx of vascular dementia  Acute change in status noted on arrival, possibly 2/2 acute CVA vs metabolic encephalopathy. Likely multifactorial with UTI, dehydration, hyperglycemia, hypercapnia, and hospital environment contributing, as well as newfound acute CVA as above. No significant electrolyte abnormalities. No suspicious meds. TSH 0.14. Some clinical improvement noted. Patient no longer somnolent or  encephalopathic, although her cognitive function does not appear intact. Family reports patient is close to baseline.   - Delirium precautions  - Continue melatonin 1mg at bedtime -- stopped as NG removed     NGT removed per patient 6/4 evening  Dysphagia s/p NGT placement 5/10  Failed initial swallow study. Likely in the setting of acute encephalopathy and acute stroke. SLP consulted, recommended strict NPO. NG placed 5/10 and started on TF. In prior discussions, PEG not within GOC. SLP re-evaluated 5/22 with patient retaining food in mouth despite multiple cues to follow but when she does eventually there is no evidence of aspiration or dysphagia though at high risk for aspiration due to oral retention. Given this, it is unlikely she will be able to maintain adequate PO intake to prevent malnutrition. RD followed for TF input and were tolerated at goal.   - pt self- removed NG 6/4 evening   - Continue pureed diet with thin liquids  - Discontinued 1:1 supervision eating 6/8 per SNF stipulations, discussed with patient's daughter  - continue to involve family in ongoing discussions      DM1  Hypoglycemia episodes   A1c of 8.8% in 3/2025. Had been NPO due to to dysphagia and now on TF. Labile blood sugars and hypoglycemia noted in setting of prior infection. PTA regimen includes lantus 18 units at bedtime. Updated Endocrine regarding plan to stop TF after family visits. Plan to stop NPH and do basal insulin only. Not on carb coverage.   Recent Labs   Lab 06/08/25  1047 06/08/25  0548 06/08/25  0300 06/08/25  0224 06/08/25  0207 06/07/25  2146   * 98 92 108* 45* 146*     - stop NPH as no longer on TF (above)  - stop carb coverage (6/8) per SNF stipulations   - Endocrine signed off but helping peripherally   - Lantus 8 units daily at 1200 (this will remain her only coverage after TF stopped)  - BG checks q4h, hypoglycemia protocol   - PRN D10 available  -resumed D5/LR     Possible stress cardiomyopathy  Echo  this admission with LVEF 50-55% with mid ventricular hypokinesis w/ preserved apical and mid segements c/w stress CM. No obvious signs of heart failure. Clinically, she has no s/sx to suggest acutely decompensated HF. Has remained RRR.   - Stopping carvedilol due to comfort cares     Hypothyroidism  Most recent TSH 0.99. Repeat TSH here 0.14. On admission, daughter noted recent medication adjustments (brand synthroid vs generic) therefore may have been over-replaced. Cannot r/o sick euthyroid state as well.  - stop PTA PO synthroid 88mcg (NG removed)     CKD stage IIIb  Baseline Cr of 1.4-1.6. Cr improved to 1.3-1.4 with IVF. Suspect chronic dehydration contributing.     HTN  HLD  -180 on admission. Can allow for permissive hypertension in setting of acute stroke per Stroke Neuro.  - Stopping medications due to comfort cares     Chronic low back pain  - Diclofenac gel PRN  - Decreasing duloxetine from 20 mg daily to 10 mg daily with plan to stop prior to discharge   - Stopping gabapentin     Closed colles' fracture of L wrist  Occurred in April, saw Ortho 4/22/25, but not deemed a surgical candidate d/t her medical comorbidities. Per Ortho, they wanted her in brace and to be NWB status at L wrist. Repeat XR redemonstrated known comminuted intra-articular fracture of distal left radius. Ortho consulted with recommendations for hand therapy and progressing ROM as tolerate out of brace.   - Coffee cup weight bearing to L wrist.  - Hand therapy if able, ROM as tolerated out of brace     Left wrist rash- stable   Initially felt to be pressure injury from wrist brace. Concern it developed vesicular appearance however VZV negative.   - WOCN consult input appreciated      Hypernatremia, resolved  Free water deficit in setting of dysphagia and NPO status. Resolved with hypotonic fluids.      UTI, recurrent, resolved  Urine culture from 5/1/25 grew pan-susceptible E.Coli. Afebrile on arrival, WBC normal. No other  obvious source of infection. Repeat UA/UC negative. Completed 7 days of IV Ceftriaxone on 5/12.          Diet: Adult Formula Drip Feeding: Continuous Level [employer] Standard 1.4; Nasogastric tube; Goal Rate: 45 ( can start at goal ); mL/hr; Initiate at 10ml/hr and advance by 10ml Q8H as tolerated.; Do not advance tube feeding rate unless K+ is = or > 3.0, Mg++...  Pureed Diet (level 4) Thin Liquids (level 0)    DVT Prophylaxis: VTE Prophylaxis contraindicated due to hopsice  Parker Catheter: Not present  Lines: None     Cardiac Monitoring: None  Code Status: No CPR- Do NOT Intubate      Clinically Significant Risk Factors               # Hypoalbuminemia: Lowest albumin = 2.9 g/dL at 5/17/2025  6:01 AM, will monitor as appropriate     # Hypertension: Noted on problem list     # Dementia: noted on problem list         # Severe Malnutrition: based on nutrition assessment and treatment provided per dietitian's recommendations.    # Financial/Environmental Concerns:           Social Drivers of Health    Food Insecurity: Unknown (5/17/2025)    Food Insecurity     Within the past 12 months, did you worry that your food would run out before you got money to buy more?: Patient unable to answer     Within the past 12 months, did the food you bought just not last and you didn t have money to get more?: Patient unable to answer   Depression: At risk (4/2/2025)    PHQ-2     PHQ-2 Score: 4   Housing Stability: Unknown (5/17/2025)    Housing Stability     Do you have housing? : Patient unable to answer     Are you worried about losing your housing?: Patient unable to answer   Tobacco Use: Medium Risk (4/24/2025)    Patient History     Smoking Tobacco Use: Former     Smokeless Tobacco Use: Never   Financial Resource Strain: Unknown (5/17/2025)    Financial Resource Strain     Within the past 12 months, have you or your family members you live with been unable to get utilities (heat, electricity) when it was really needed?: Patient  unable to answer   Transportation Needs: Unknown (5/17/2025)    Transportation Needs     Within the past 12 months, has lack of transportation kept you from medical appointments, getting your medicines, non-medical meetings or appointments, work, or from getting things that you need?: Patient unable to answer   Interpersonal Safety: Unknown (5/17/2025)    Interpersonal Safety     Do you feel physically and emotionally safe where you currently live?: Patient unable to answer     Within the past 12 months, have you been hit, slapped, kicked or otherwise physically hurt by someone?: Patient unable to answer     Within the past 12 months, have you been humiliated or emotionally abused in other ways by your partner or ex-partner?: Patient unable to answer          Disposition Plan     Medically Ready for Discharge: Ready Now           The patient's care was discussed with the Attending Physician, Dr. Shearer, Bedside Nurse, Care Coordinator/, Patient, and Patient's Family.    Deepali Mcgovern PA-C  Hospitalist Service, GOLD TEAM [address] [place] [address]  Securely message with [employer] (more info)  Text page via [facility] Paging/Directory   See signed in provider for up to date coverage information  ______________________________________________________________________    Interval History   Isabell is unable to give history as she is not offering any verbal or non-verbal responses/cues to questions this morning.  Per nursing, she spat out her morning medications. She has appeared to be comfortable overnight and this morning.     Physical Exam   Vital Signs:                    Weight: 129 lbs 10.09 oz  Generalized appearance: Alert no verbal response.  Lying supine and rotates torso independently. Appears comfortable.  NAD  HEENT: NC, AT, pupils equal and round, sclera anicteric, EOMI  PULM: , non-labored breathing on RA  GI:  soft, NT, ND  Extremities: no edema, + PT and radial  pulses  SKIN: CDI, noncyanotic, anicteric    Medical Decision Making       50 MINUTES SPENT BY ME on the date of service doing chart review, history, exam, documentation & further activities per the note.      Data         Imaging results reviewed over the past 24 hrs:   No results found for this or any previous visit (from the past 24 hours).

## 2025-06-08 NOTE — PLAN OF CARE
Goal Outcome Evaluation:    Shift Hours: 1900 - 0700    Assessment:  Body systems that were not at patient's baseline Cognitive/Behavioral/Neuro, Gastrointestinal, Genitourinary, Musculoskeletal, and Safety. Focused body system assessments documented in flowsheets.        Activity     Fall Risk Score: 95   Bed alarm on? Yes     Activity Assistance Provided: assistance, 2 people      Assistive Device Utilized: gait belt    Pain: Hip    Labs/RN Managed Protocols: Q4H blood sugars    Lines/Drains: PIV SL    Nutrition: 1:1 Feeder, pureed    Goal Outcome Evaluation    Plan of Care Reviewed With: patient  Overall Patient Progress: declining  Outcome Evaluation: Pt continues comfort care measures as well as blood sugar management. Able to take oral solution meds with consistent prompting to swallow medication. 1:1 feed strict supervision. Bed alarm on. BG 78 at bedtime, Casey Neri paged and D50 given, recheck stable. 0200 check 45, D50 given again, recheck stable and am check stable at 98. Pt turned self in bed, incontinent. IVF continued. CPOC and safe hourly rounding.    Barriers to Discharge:     TCU/Hospice

## 2025-06-08 NOTE — PLAN OF CARE
Shift Hours: 0700 - 1900     Assessment:  Body systems that were not at patient's baseline Cognitive/Behavioral/Neuro, Gastrointestinal, Genitourinary, Skin, and Musculoskeletal. Focused body system assessments documented in flowsheets.        Activity              Fall Risk Score: 110              Bed alarm on? Yes     Activity Assistance Provided: assistance, 2 people      Assistive Device Utilized: gait belt, walker    Pain: denies     Labs/RN Managed Protocols: no RN managed labs, BG q4h discontinued this afternoon    Lines/Drains: L PIV infusing D5LR @ 50 mL/hr    Nutrition: pureed w/ poor appetite, off of 1:1 feed    Goal Outcome Evaluation:      Plan of Care Reviewed With: patient    Overall Patient Progress: no changeOverall Patient Progress: no change    Outcome Evaluation: No acute events this shift. Patient remains on comfort cares and appears comfortable throughout shift. Potential care conference again tomorrow. Patient refused all PO meds this shift, team aware. Got up to recliner this afternoon. Q1h rounding completed, call light w/in reach and able to make needs known.    Barriers to Discharge:   Placement

## 2025-06-08 NOTE — PROGRESS NOTES
Patient no longer needs 1:1 feeding assistance. Blood sugar checks, insulin administration and carb coverage/counting has been discontinued.

## 2025-06-09 PROCEDURE — 250N000011 HC RX IP 250 OP 636: Performed by: PHYSICIAN ASSISTANT

## 2025-06-09 PROCEDURE — 99232 SBSQ HOSP IP/OBS MODERATE 35: CPT | Mod: FS | Performed by: PEDIATRICS

## 2025-06-09 PROCEDURE — 250N000013 HC RX MED GY IP 250 OP 250 PS 637: Performed by: INTERNAL MEDICINE

## 2025-06-09 PROCEDURE — 120N000002 HC R&B MED SURG/OB UMMC

## 2025-06-09 PROCEDURE — 250N000009 HC RX 250: Performed by: INTERNAL MEDICINE

## 2025-06-09 PROCEDURE — 250N000013 HC RX MED GY IP 250 OP 250 PS 637: Performed by: PHYSICIAN ASSISTANT

## 2025-06-09 PROCEDURE — 99207 PR APP CREDIT; MD BILLING SHARED VISIT: CPT | Performed by: PHYSICIAN ASSISTANT

## 2025-06-09 RX ADMIN — DICLOFENAC SODIUM 2 G: 10 GEL TOPICAL at 19:59

## 2025-06-09 RX ADMIN — DICLOFENAC SODIUM 2 G: 10 GEL TOPICAL at 09:11

## 2025-06-09 RX ADMIN — LEVETIRACETAM 250 MG: 100 SOLUTION ORAL at 09:12

## 2025-06-09 RX ADMIN — DICLOFENAC SODIUM 2 G: 10 GEL TOPICAL at 12:56

## 2025-06-09 RX ADMIN — MICONAZOLE NITRATE: 20 CREAM TOPICAL at 09:12

## 2025-06-09 RX ADMIN — DICLOFENAC SODIUM 2 G: 10 GEL TOPICAL at 16:11

## 2025-06-09 RX ADMIN — LEVETIRACETAM 250 MG: 100 SOLUTION ORAL at 19:59

## 2025-06-09 RX ADMIN — METHADONE HYDROCHLORIDE 1 MG: 10 CONCENTRATE ORAL at 09:26

## 2025-06-09 RX ADMIN — MICONAZOLE NITRATE: 20 CREAM TOPICAL at 19:59

## 2025-06-09 RX ADMIN — DEXTROSE, SODIUM CHLORIDE, SODIUM LACTATE, POTASSIUM CHLORIDE, AND CALCIUM CHLORIDE: 5; .6; .31; .03; .02 INJECTION, SOLUTION INTRAVENOUS at 06:43

## 2025-06-09 RX ADMIN — Medication 10 MG: at 09:11

## 2025-06-09 RX ADMIN — METHADONE HYDROCHLORIDE 1 MG: 10 CONCENTRATE ORAL at 22:46

## 2025-06-09 ASSESSMENT — ACTIVITIES OF DAILY LIVING (ADL)
ADLS_ACUITY_SCORE: 101
ADLS_ACUITY_SCORE: 94
ADLS_ACUITY_SCORE: 101
ADLS_ACUITY_SCORE: 94
ADLS_ACUITY_SCORE: 101
ADLS_ACUITY_SCORE: 101
ADLS_ACUITY_SCORE: 94
ADLS_ACUITY_SCORE: 98
ADLS_ACUITY_SCORE: 101
ADLS_ACUITY_SCORE: 96
ADLS_ACUITY_SCORE: 96
ADLS_ACUITY_SCORE: 101
ADLS_ACUITY_SCORE: 94
ADLS_ACUITY_SCORE: 101

## 2025-06-09 NOTE — PLAN OF CARE
Patient on comfort cares. Turned and repositined q2h. Oral cares done. Incontient of bowel and bladder. D5 LR running at 50ml/hr. care conference TBD      Goal Outcome Evaluation:      Plan of Care Reviewed With: patient    Overall Patient Progress: no changeOverall Patient Progress: no change

## 2025-06-09 NOTE — PROGRESS NOTES
Care Management Follow Up    Length of Stay (days): 33    Expected Discharge Date: 06/11/2025     Concerns to be Addressed: discharge planning, coping/stress, care coordination/care conferences, decision making, utilization management     Patient plan of care discussed at interdisciplinary rounds: Yes    Anticipated Discharge Disposition: Long Term Care, Hospice    [facility], [place]  ArlineMalcolm- Ph: [phone]  $170/day room and board     Anticipated Discharge Services: None  Anticipated Discharge DME: None    Patient/family educated on Medicare website which has current facility and service quality ratings: yes  Education Provided on the Discharge Plan: Yes  Patient/Family in Agreement with the Plan: yes    Referrals Placed by CM/SW: Post Acute Facilities, Hospice  Private pay costs discussed: Not applicable    Discussed  Partnership in Safe Discharge Planning  document with patient/family: No     Handoff Completed: No, handoff not indicated or clinically appropriate    Additional Information:  KINZA spoke with [facility] Provider regarding discharge plan.  SW will plan to reach out to pt's daughter about discharge plan via phone at 1pm today.      SW spoke with admissions at [facility] (family's first choice) and was informed they have no LTC beds. They recommended SW reach out to residential hospice in [place].  KINZA called [facility] and spoke with Malcolm who covers residential hospice in [place] and [place].  Per Malcolm, [facility] has 2 beds available in [place] and 3 beds available in [place].  Medicare covers everything but room and board, which is $170/day.  KINZA informed provider of this discharge option to be presented to family at 1pm today, so long as pt is medically stable to handle transport.     KINZA called pt's daughter and [facility], Vishal, with Dr. Shearer and HEBERT Velazquez and explained option of Residential Hospice in  [place] to pt as well as the associated $170/day cost with option for applying for finacial assistance.  SW explained that last remaining LTC referral in [facility] [place], had declined, otherwise pt could go to LTC at [facility]. Vishal responded that she believed that Residential Hospice through [place] seemed like the right move but that she would want to speak with her siblings tonight and would get back to this writer by tomorrow.  SW and providers shared that this is a reasonable request and will follow up by tomorrow.     Pending Referrals:     [facility] (Ph: [phone]; F: [phone])  [address] [place] [address]   6/8: New referral submitted.  6/9: Response received via email. Team believes it is too soon for admission at this time but will hold her referral and can get updates as pt progresses.      [facility] (Arline, Consuelo Power; P: [phone]; F: [phone]; E: [email or web address]) - [facility] [place]   6/8: Weekend liaison [facility] [place] [facility] Currently their sister facility [facility] could offer LTC placement.       [facility]- Marisela Nevarez   [address] [place] [address]  Phone: [phone]  Fax: [phone]  6/6: Writer called & LVM for admissions to follow-up on referral.  6/9: No beds available. Recommended looking at residential hospice in [place] or [place].      [facility]  [address] [place] [address]  Phone: [phone]  Fax: [phone]  6/6: Writer called to speak with admissions, who was out for the day. Writer left a VM.  6/9: Declined- no female beds.      Julianne Hercules  [address] [place] [address]  Phone: [phone]  Fax: [phone]  6/6: Writer called & LVM for Bethanie in admissions.  6/9: SW called and left VM for Bethanie in admissions.      Declined Referrals   [facility]  Home - not able to meet patient's needs   [facility] - acuity too high  [facility]  -  No LTC bed anticipated for many months.  [facility] - Bed not available.    Next Steps:   - Follow up with Vishal re: the [facility] in [place].  - Place referral with Malcolm at [place] for the [facility] in [place]. Malcolm Direct Ph: [phone]  - Coordinate stretcher transportation for discharge.   __________________________     ROBIN Sinclair, LGSW  , [facility]  [address] Medical Surgical Beds [id number]   Desk Phone: [phone]   Securely message with [employer] (Search Name or 7C Med Surg [phone] SW)  Text page via [facility] Paging/Directory   See signed in provider for up to date coverage information  Social Work & Care Management Department

## 2025-06-09 NOTE — PROGRESS NOTES
[facility]    Medicine Progress Note - Hospitalist Service, GOLD TEAM 6    Date of Admission:  5/7/2025    Assessment & Plan   Isabell Matthews is a 64F w/ PMH T1DM c/b diabetic neuropathy, orthostatic hypotension, seizures, vascular dementia, prior CVAs, hypothyroidism, recurrent UTIs, and frequent falls  admitted on 5/7/2025.   She initially presented to the ED for evaluation of stroke-like symptoms. MRI completed showing acute infarcts for which she was not a candidate for advanced therapies. Ongoing admission due to ongoing goals of care discussion between providers and family for disposition. Care conference on 5/29 with decision to transition Isabell to comfort cares and placing referrals for hospice with preference for [facility]. Leaving NG access and TF as well as insulin coverage in the interim to allow for additional family to visit from out of town for now.      Goals of care  Comfort cares  Decision made to transition to comfort cares and discharge to hospice facility (hopeful for [facility]). 1:1 bedside attendant and insulin carb coverage discontinued 6/8 to help facilitate acceptance at OP facility. D/w SW today with pt's daughter Gali on phone, and pt accepted to residential hospice in [place], [place] that is self-pay, and she will d/w this option with her brother and sister but highly anticipates they will all agree this is best option to discharge to.   - NG dislodged 6/4 evening              - stopped TF              - stopped NPH  - Family with concern for medication withdrawal side effects; will work on weaning especially her psychiatric medications. Continue current dose duloxetine per d/w daughter for now (missed dose on 6/6)  - Palliative care or followed until 6/6, appropriate to sign off  - Comfort cares (no vitals, labs, etc)  - Methadone 1 mg q12h (missed dose on 6/6)  - Lorazepam solution 0.5 mg q4h PRN      Multifocal acute cerebral infarcts  Hx of multiple  CVAs  Hx of seizures  Presented w/ new findings of dysarthria, dysphagia, decreased responsiveness, confusion. CT head negative for acute infarct or hemorrhage. CTA w/ multifocal stenosis involving L RADHA and R MCA which had progressed since 1/16/25. Stroke Neuro team consulted while in the ED. Loaded with keppra. EEG negative. Found to have UTI so concern presentation was recrudescence of prior CVA deficits however MRI brain obtained showing acute infarcts in R corona radiata, precentral gyrus and operculum. Not a candidate for advanced therapies.   - Stopped DAPT, atorvastatin due to comfort cares focus in discussion with family  - Continued on Keppra 250 mg BID (tolerating oral dosing, though did not take dose morning of 6/6)   - Ativan available as needed seizures     Acute metabolic encephalopathy, multifactorial  Hx of vascular dementia  Acute change in status noted on arrival, possibly 2/2 acute CVA vs metabolic encephalopathy. Likely multifactorial with UTI, dehydration, hyperglycemia, hypercapnia, and hospital environment contributing, as well as newfound acute CVA as above. No significant electrolyte abnormalities. No suspicious meds. TSH 0.14. Patient no longer somnolent or encephalopathic, although her cognitive function does not appear intact. Family reports patient is close to baseline.   - Delirium precautions  - Melatonin 1mg at bedtime stopped as NG removed     NGT removed per patient 6/4 evening  Dysphagia s/p NGT placement 5/10  Failed initial swallow study. Likely in the setting of acute encephalopathy and acute stroke. SLP consulted, recommended strict NPO. NG placed 5/10 and started on TF. In prior discussions, PEG not within GOC. SLP re-evaluated 5/22 with patient retaining food in mouth despite multiple cues to follow but when she does eventually there is no evidence of aspiration or dysphagia though at high risk for aspiration due to oral retention. Given this, it is unlikely she will be able  to maintain adequate PO intake to prevent malnutrition. RD followed for TF input and were tolerated at goal.   - Pt self- removed NG 6/4 evening   - Continue pureed diet with thin liquids  - Discontinued 1:1 supervision eating 6/8 per SNF stipulations, discussed with patient's daughter  - Continue to involve family in ongoing discussions      DM1  Hypoglycemia episodes   A1c of 8.8% in 3/2025. Had been NPO due to to dysphagia and now on TF. Labile blood sugars and hypoglycemia noted in setting of prior infection. PTA regimen includes lantus 18 units at bedtime. Updated Endocrine regarding plan to stop TF after family visits. Plan to stop NPH and do basal insulin only. Not on carb coverage.   Recent Labs   Lab 06/08/25  1047 06/08/25  0548 06/08/25  0300 06/08/25  0224 06/08/25  0207 06/07/25  2146   * 98 92 108* 45* 146*      - Stopped NPH as no longer on TF (above)  - Stopped carb coverage (6/8) per SNF stipulations   - Endocrine signed off but helping peripherally   - Lantus 8 units daily at 1200 (this will remain her only coverage after TF stopped)  - BG checks q4h, hypoglycemia protocol   - PRN D10 available  - Resumed D5/LR     Possible stress cardiomyopathy  Echo this admission with LVEF 50-55% with mid ventricular hypokinesis w/ preserved apical and mid segements c/w stress CM. No obvious signs of heart failure. Clinically, she has no s/sx to suggest acutely decompensated HF. Has remained RRR.   - Stopped carvedilol due to comfort cares     Hypothyroidism  Most recent TSH 0.99. Repeat TSH here 0.14. On admission, daughter noted recent medication adjustments (brand synthroid vs generic) therefore may have been over-replaced. Cannot r/o sick euthyroid state as well.  - Stopped PTA PO synthroid 88mcg (NG removed)     CKD stage IIIb  Baseline Cr of 1.4-1.6. Cr improved to 1.3-1.4 with IVF. Suspected chronic dehydration contributing. Discontinued further lab draws now on comfort cares.      HTN  HLD  SBP  160-180 on admission. Can allow for permissive hypertension in setting of acute stroke per Stroke Neuro.  - Stopped antihypertensives due to comfort cares     Chronic low back pain  - Diclofenac gel PRN  - Decreased duloxetine from 20 mg daily to 10 mg daily with plan to stop prior to discharge   - Stopped gabapentin     Closed colles' fracture of L wrist  Occurred in April, saw Ortho 4/22/25, but not deemed a surgical candidate d/t her medical comorbidities. Per Ortho, they wanted her in brace and to be NWB status at L wrist. Repeat XR redemonstrated known comminuted intra-articular fracture of distal left radius. Ortho consulted with recommendations for hand therapy and progressing ROM as tolerate out of brace.   - Coffee cup weight bearing to L wrist.  - Hand therapy if able, ROM as tolerated out of brace     Left wrist rash- stable   Initially felt to be pressure injury from wrist brace. Concern it developed vesicular appearance however VZV negative.   - WOCN consult input appreciated         Diet: Pureed Diet (level 4) Thin Liquids (level 0)    DVT Prophylaxis: VTE Prophylaxis contraindicated due to hospice  Parker Catheter: Not present  Lines: None     Cardiac Monitoring: None  Code Status: No CPR- Do NOT Intubate      Social Drivers of Health    Food Insecurity: Unknown (5/17/2025)    Food Insecurity     Within the past 12 months, did you worry that your food would run out before you got money to buy more?: Patient unable to answer     Within the past 12 months, did the food you bought just not last and you didn t have money to get more?: Patient unable to answer   Depression: At risk (4/2/2025)    PHQ-2     PHQ-2 Score: 4   Housing Stability: Unknown (5/17/2025)    Housing Stability     Do you have housing? : Patient unable to answer     Are you worried about losing your housing?: Patient unable to answer   Tobacco Use: Medium Risk (4/24/2025)    Patient History     Smoking Tobacco Use: Former     Smokeless  Tobacco Use: Never   Financial Resource Strain: Unknown (5/17/2025)    Financial Resource Strain     Within the past 12 months, have you or your family members you live with been unable to get utilities (heat, electricity) when it was really needed?: Patient unable to answer   Transportation Needs: Unknown (5/17/2025)    Transportation Needs     Within the past 12 months, has lack of transportation kept you from medical appointments, getting your medicines, non-medical meetings or appointments, work, or from getting things that you need?: Patient unable to answer   Interpersonal Safety: Unknown (5/17/2025)    Interpersonal Safety     Do you feel physically and emotionally safe where you currently live?: Patient unable to answer     Within the past 12 months, have you been hit, slapped, kicked or otherwise physically hurt by someone?: Patient unable to answer     Within the past 12 months, have you been humiliated or emotionally abused in other ways by your partner or ex-partner?: Patient unable to answer          Disposition Plan     Medically Ready for Discharge: Ready Now    The patient's care was discussed with the Attending Physician, Dr. Shearer, Patient, and Patient's Family.    Stone Velazquez PA-C  Hospitalist Service, GOLD TEAM [address] [place] [address]  Securely message with [employer] (more info)  Text page via [facility] Paging/Directory   See signed in provider for up to date coverage information  ______________________________________________________________________    Interval History   NAEO. Pt does not respond to most questions asked. Updated one of pt's daughter's (Gali) today and she most likely thinks that after d/w her brother and sister, they will discharge their Mom to hospice facility in [place], [place] on private pay.     Physical Exam   Vital Signs:                    Weight: 113 lbs 0 oz  GEN: In NAD  LUNGS: CTAB  CV: RRR  ABD: +BSs; SNTND  EXT: No BLE  edema  SKIN: No acute rashes noted on exposed areas.  NEURO: Awake, but does not respond to questions.       Medical Decision Making       60 MINUTES SPENT BY ME on the date of service doing chart review, history, exam, documentation & further activities per the note.      Data   CMP  Recent Labs   Lab 06/08/25  1047 06/08/25  0548 06/08/25  0300 06/08/25  0224   * 98 92 108*     CBCNo lab results found in last 7 days.  INRNo lab results found in last 7 days.  Arterial Blood GasNo lab results found in last 7 days.

## 2025-06-09 NOTE — PLAN OF CARE
Goal Outcome Evaluation:    Shift Hours: 1900 - 0700    Assessment:  Body systems that were not at patient's baseline Cognitive/Behavioral/Neuro, Gastrointestinal, Genitourinary, Skin, Musculoskeletal, and Safety. Focused body system assessments documented in flowsheets.        Activity     Fall Risk Score: 110   Bed alarm on? Yes     Activity Assistance Provided: assistance, 2 people      Assistive Device Utilized: gait belt, walker    Pain: L Hip per pt    Labs/RN Managed Protocols: none    Lines/Drains: L PIV    Nutrition: Pureed total feed    Goal Outcome Evaluation    Plan of Care Reviewed With: patient  Overall Patient Progress: no change    Outcome Evaluation: Comfort cares continued, able to make some needs known but hourly rounded continued. Took PO meds, swallowing needed to be coached. Bed alarm on. IVF continued. Incontinent, changed frequently.     Barriers to Discharge:     Care conference in am.

## 2025-06-10 LAB — GLUCOSE BLDC GLUCOMTR-MCNC: 345 MG/DL (ref 70–99)

## 2025-06-10 PROCEDURE — 99232 SBSQ HOSP IP/OBS MODERATE 35: CPT | Performed by: INTERNAL MEDICINE

## 2025-06-10 PROCEDURE — 250N000009 HC RX 250: Performed by: INTERNAL MEDICINE

## 2025-06-10 PROCEDURE — 250N000011 HC RX IP 250 OP 636: Performed by: PHYSICIAN ASSISTANT

## 2025-06-10 PROCEDURE — 250N000013 HC RX MED GY IP 250 OP 250 PS 637: Performed by: INTERNAL MEDICINE

## 2025-06-10 PROCEDURE — 99207 PR APP CREDIT; MD BILLING SHARED VISIT: CPT | Performed by: PHYSICIAN ASSISTANT

## 2025-06-10 PROCEDURE — 120N000002 HC R&B MED SURG/OB UMMC

## 2025-06-10 PROCEDURE — 250N000013 HC RX MED GY IP 250 OP 250 PS 637: Performed by: PHYSICIAN ASSISTANT

## 2025-06-10 RX ADMIN — LEVETIRACETAM 250 MG: 100 SOLUTION ORAL at 09:36

## 2025-06-10 RX ADMIN — DEXTROSE, SODIUM CHLORIDE, SODIUM LACTATE, POTASSIUM CHLORIDE, AND CALCIUM CHLORIDE: 5; .6; .31; .03; .02 INJECTION, SOLUTION INTRAVENOUS at 21:28

## 2025-06-10 RX ADMIN — METHADONE HYDROCHLORIDE 1 MG: 10 CONCENTRATE ORAL at 21:07

## 2025-06-10 RX ADMIN — MICONAZOLE NITRATE: 20 CREAM TOPICAL at 21:07

## 2025-06-10 RX ADMIN — METHADONE HYDROCHLORIDE 1 MG: 10 CONCENTRATE ORAL at 09:36

## 2025-06-10 RX ADMIN — LEVETIRACETAM 250 MG: 100 SOLUTION ORAL at 21:07

## 2025-06-10 RX ADMIN — DICLOFENAC SODIUM 2 G: 10 GEL TOPICAL at 09:40

## 2025-06-10 RX ADMIN — DICLOFENAC SODIUM 2 G: 10 GEL TOPICAL at 21:07

## 2025-06-10 RX ADMIN — DEXTROSE, SODIUM CHLORIDE, SODIUM LACTATE, POTASSIUM CHLORIDE, AND CALCIUM CHLORIDE: 5; .6; .31; .03; .02 INJECTION, SOLUTION INTRAVENOUS at 02:05

## 2025-06-10 RX ADMIN — DICLOFENAC SODIUM 2 G: 10 GEL TOPICAL at 12:43

## 2025-06-10 RX ADMIN — Medication 10 MG: at 09:36

## 2025-06-10 RX ADMIN — MICONAZOLE NITRATE: 20 CREAM TOPICAL at 09:40

## 2025-06-10 ASSESSMENT — ACTIVITIES OF DAILY LIVING (ADL)
ADLS_ACUITY_SCORE: 98
ADLS_ACUITY_SCORE: 96
ADLS_ACUITY_SCORE: 98
ADLS_ACUITY_SCORE: 96
ADLS_ACUITY_SCORE: 98
ADLS_ACUITY_SCORE: 96
ADLS_ACUITY_SCORE: 98
ADLS_ACUITY_SCORE: 98
ADLS_ACUITY_SCORE: 96
ADLS_ACUITY_SCORE: 98
ADLS_ACUITY_SCORE: 98
ADLS_ACUITY_SCORE: 96
ADLS_ACUITY_SCORE: 98
ADLS_ACUITY_SCORE: 96
ADLS_ACUITY_SCORE: 98

## 2025-06-10 NOTE — PROGRESS NOTES
[facility]    Medicine Progress Note - Hospitalist Service, GOLD TEAM 6    Date of Admission:  5/7/2025    Assessment & Plan   Isabell Matthews is a 64F w/ PMH T1DM c/b diabetic neuropathy, orthostatic hypotension, seizures, vascular dementia, prior CVAs, hypothyroidism, recurrent UTIs, and frequent falls  admitted on 5/7/2025.   She initially presented to the ED for evaluation of stroke-like symptoms. MRI completed showing acute infarcts for which she was not a candidate for advanced therapies. Ongoing admission due to ongoing goals of care discussion between providers and family for disposition. Care conference on 5/29 with decision to transition Isabell to comfort cares and placing referrals for hospice with preference for [facility]. Leaving NG access and TF as well as insulin coverage in the interim to allow for additional family to visit from out of town for now.     Update, 6/10/25:  - Pt's children have agreed to have pt discharged to residential hospice in [facility] trying to coordinate transportation to this facility     Goals of care  Comfort cares  Decision made to transition to comfort cares and discharge to hospice facility (hopeful for [facility]). 1:1 bedside attendant and insulin carb coverage discontinued 6/8 to help facilitate acceptance at OP facility.   - Pt's children have agreed to have pt discharged to residential hospice in [facility] trying to coordinate transportation to this facility  - NG dislodged 6/4 evening              - stopped TF              - stopped NPH  - Family with concern for medication withdrawal side effects; will work on weaning especially her psychiatric medications. Continue current dose duloxetine per d/w daughter for now (missed dose on 6/6)  - Palliative care or followed until 6/6, appropriate to sign off  - Comfort cares (no vitals, labs, etc)  - Methadone 1 mg q12h (missed dose on 6/6)  - Lorazepam solution 0.5 mg  q4h PRN      Multifocal acute cerebral infarcts  Hx of multiple CVAs  Hx of seizures  Presented w/ new findings of dysarthria, dysphagia, decreased responsiveness, confusion. CT head negative for acute infarct or hemorrhage. CTA w/ multifocal stenosis involving L RADHA and R MCA which had progressed since 1/16/25. Stroke Neuro team consulted while in the ED. Loaded with keppra. EEG negative. Found to have UTI so concern presentation was recrudescence of prior CVA deficits however MRI brain obtained showing acute infarcts in R corona radiata, precentral gyrus and operculum. Not a candidate for advanced therapies.   - Stopped DAPT, atorvastatin due to comfort cares focus in discussion with family  - Continued on Keppra 250 mg BID (tolerating oral dosing, though did not take dose morning of 6/6)   - Ativan available as needed seizures     Acute metabolic encephalopathy, multifactorial  Hx of vascular dementia  Acute change in status noted on arrival, possibly 2/2 acute CVA vs metabolic encephalopathy. Likely multifactorial with UTI, dehydration, hyperglycemia, hypercapnia, and hospital environment contributing, as well as newfound acute CVA as above. No significant electrolyte abnormalities. No suspicious meds. TSH 0.14. Patient no longer somnolent or encephalopathic, although her cognitive function does not appear intact. Family reports patient is close to baseline.   - Delirium precautions  - Melatonin 1mg at bedtime stopped as NG removed     NGT removed per patient 6/4 evening  Dysphagia s/p NGT placement 5/10  Failed initial swallow study. Likely in the setting of acute encephalopathy and acute stroke. SLP consulted, recommended strict NPO. NG placed 5/10 and started on TF. In prior discussions, PEG not within GOC. SLP re-evaluated 5/22 with patient retaining food in mouth despite multiple cues to follow but when she does eventually there is no evidence of aspiration or dysphagia though at high risk for aspiration  due to oral retention. Given this, it is unlikely she will be able to maintain adequate PO intake to prevent malnutrition. RD followed for TF input and were tolerated at goal.   - Pt self- removed NG 6/4 evening   - Continue pureed diet with thin liquids  - Discontinued 1:1 supervision eating 6/8 per SNF stipulations, discussed with patient's daughter  - Continue to involve family in ongoing discussions      DM1  Hypoglycemia episodes   A1c of 8.8% in 3/2025. Had been NPO due to to dysphagia and now on TF. Labile blood sugars and hypoglycemia noted in setting of prior infection. PTA regimen includes lantus 18 units at bedtime. Updated Endocrine regarding plan to stop TF after family visits. Plan to stop NPH and do basal insulin only. Not on carb coverage.   Recent Labs   Lab 06/10/25  0230 06/08/25  1047 06/08/25  0548 06/08/25  0300 06/08/25  0224 06/08/25  0207   * 155* 98 92 108* 45*      - Stopped NPH as no longer on TF (above)  - Stopped carb coverage (6/8) per SNF stipulations   - Endocrine signed off but helping peripherally   - Lantus 8 units daily at 1200 (this will remain her only coverage after TF stopped)  - BG checks q4h, hypoglycemia protocol   - PRN D10 available  - Resumed D5/LR     Possible stress cardiomyopathy  Echo this admission with LVEF 50-55% with mid ventricular hypokinesis w/ preserved apical and mid segements c/w stress CM. No obvious signs of heart failure. Clinically, she has no s/sx to suggest acutely decompensated HF. Has remained RRR.   - Stopped carvedilol due to comfort cares     Hypothyroidism  Most recent TSH 0.99. Repeat TSH here 0.14. On admission, daughter noted recent medication adjustments (brand synthroid vs generic) therefore may have been over-replaced. Cannot r/o sick euthyroid state as well.  - Stopped PTA PO synthroid 88mcg (NG removed)     CKD stage IIIb  Baseline Cr of 1.4-1.6. Cr improved to 1.3-1.4 with IVF. Suspected chronic dehydration contributing.  Discontinued further lab draws now on comfort cares.      HTN  HLD  -180 on admission. Can allow for permissive hypertension in setting of acute stroke per Stroke Neuro.  - Stopped antihypertensives due to comfort cares     Chronic low back pain  - Diclofenac gel PRN  - Decreased duloxetine from 20 mg daily to 10 mg daily with plan to stop prior to discharge   - Stopped gabapentin     Closed colles' fracture of L wrist  Occurred in April, saw Ortho 4/22/25, but not deemed a surgical candidate d/t her medical comorbidities. Per Ortho, they wanted her in brace and to be NWB status at L wrist. Repeat XR redemonstrated known comminuted intra-articular fracture of distal left radius. Ortho consulted with recommendations for hand therapy and progressing ROM as tolerate out of brace.   - Coffee cup weight bearing to L wrist.  - Hand therapy if able, ROM as tolerated out of brace     Left wrist rash- stable   Initially felt to be pressure injury from wrist brace. Concern it developed vesicular appearance however VZV negative.   - WOCN consult input appreciated         Diet: Pureed Diet (level 4) Thin Liquids (level 0)    DVT Prophylaxis: VTE Prophylaxis contraindicated due to hospice  Parker Catheter: Not present  Lines: None     Cardiac Monitoring: None  Code Status: No CPR- Do NOT Intubate      Social Drivers of Health    Food Insecurity: Unknown (5/17/2025)    Food Insecurity     Within the past 12 months, did you worry that your food would run out before you got money to buy more?: Patient unable to answer     Within the past 12 months, did the food you bought just not last and you didn t have money to get more?: Patient unable to answer   Depression: At risk (4/2/2025)    PHQ-2     PHQ-2 Score: 4   Housing Stability: Unknown (5/17/2025)    Housing Stability     Do you have housing? : Patient unable to answer     Are you worried about losing your housing?: Patient unable to answer   Tobacco Use: Medium Risk  (4/24/2025)    Patient History     Smoking Tobacco Use: Former     Smokeless Tobacco Use: Never   Financial Resource Strain: Unknown (5/17/2025)    Financial Resource Strain     Within the past 12 months, have you or your family members you live with been unable to get utilities (heat, electricity) when it was really needed?: Patient unable to answer   Transportation Needs: Unknown (5/17/2025)    Transportation Needs     Within the past 12 months, has lack of transportation kept you from medical appointments, getting your medicines, non-medical meetings or appointments, work, or from getting things that you need?: Patient unable to answer   Interpersonal Safety: Unknown (5/17/2025)    Interpersonal Safety     Do you feel physically and emotionally safe where you currently live?: Patient unable to answer     Within the past 12 months, have you been hit, slapped, kicked or otherwise physically hurt by someone?: Patient unable to answer     Within the past 12 months, have you been humiliated or emotionally abused in other ways by your partner or ex-partner?: Patient unable to answer          Disposition Plan     Medically Ready for Discharge: Ready Now    The patient's care was discussed with the Attending Physician, Dr. Shearer, Patient, and Patient's Family.    Stone Velazquez PA-C  Hospitalist Service, GOLD TEAM [address] [place] [address]  Securely message with [phone] (more info)  Text page via [employer] Paging/Directory   See signed in provider for up to date coverage information  ______________________________________________________________________    Interval History   NAEO. Pt's children and finalized their decision and want pt discharged to residential hospice in [place], [place].     Physical Exam   Vital Signs:                    Weight: 113 lbs 0 oz  GEN: In NAD  LUNGS: CTAB  CV: RRR  ABD: +BSs; SNTND  EXT: No BLE edema  NEURO: Awake, but does not respond to questions.        Medical Decision Making       30 MINUTES SPENT BY ME on the date of service doing chart review, history, exam, documentation & further activities per the note.      Data   CMP  Recent Labs   Lab 06/10/25  0230 06/08/25  1047 06/08/25  0548 06/08/25  0300   * 155* 98 92     CBCNo lab results found in last 7 days.  INRNo lab results found in last 7 days.  Arterial Blood GasNo lab results found in last 7 days.

## 2025-06-10 NOTE — PROGRESS NOTES
Care Management Follow Up    Length of Stay (days): 34    Expected Discharge Date: 06/11/2025     Concerns to be Addressed: discharge planning, coping/stress, care coordination/care conferences, decision making, utilization management     Patient plan of care discussed at interdisciplinary rounds: Yes    Anticipated Discharge Disposition: Long Term Care, Hospice     [facility] [place], [place]  Ph: [phone];  Malcolm Nunn- Ph: [phone]  $170/day room and board     Anticipated Discharge Services: Transportation  [facility]- Stretcher Ride to [place]  Ph: [phone], Billing Ph: [phone]    Anticipated Discharge DME: None    Patient/family educated on Medicare website which has current facility and service quality ratings: yes  Education Provided on the Discharge Plan: Yes  Patient/Family in Agreement with the Plan: yes    Referrals Placed by CM/SW: Post Acute Facilities, Hospice  Private pay costs discussed: Not applicable    Discussed  Partnership in Safe Discharge Planning  document with patient/family: No     Handoff Completed: No, handoff not indicated or clinically appropriate    Additional Information:   received VM from pt's daughter, Vishal, from previous evening stating that they would like to move forward with residential hospice through [facility] in [place]. Vishal requested a call back as she has some questions as to how things are billed.    KINZA called Malcolm at [facility]. KINZA left VM at 10:28am requesting a call back.     KINZA left VM with [facility] Billing regarding stretcher transport costs to [place] with pt's insurance plan.  KINZA received a VM back from Catherine in EMS Billing who stated that they would need an Advanced Receipt signed by pt/pt's family that acknowledged that pt could be responsible for excess mileage not covered by MA, and to call back to start this process.  KINZA called  back EMS billing and left follow up VM regarding Advanced Receipt process.     @1430- SW called back Malcolm at [facility] to start referral process. Malcolm will review and call back with any questions.     @1518- SW received call back from Hyun with EMS Billing. Hyun shared that, because pt has an MA product, EMS can fax over medicaid form, called an ARN, which is a notice of estimate of the transport. The ANS lets pt know that EMS will bill insurance but don't know if they will pay 100%. When SW has discharge date identified, they can call transport for scheduling.  Hyun will fax the form over now for SW to have family sign.      -Hyun with EMS called back and left VM to confirm  address and drop off address to determine mileage for cost estimate and to confirm pt would be going BLS.     @1559- KINZA attempted to call pt's daughter, Vishal, back to update on progress with [facility] and transportation.  There was no answer and no option to leave VM.    @1549-  received call back from Malcolm at [facility].  Malcolm shared that pt appears clinically appropriate but that he is not seeing a hospice diagnosis.  Malcolm requested pt's Albumin be retested, as he could not see a result since mid-may and under 2.5 would qualify for hospice.  Additionally, Malcolm will look back into pt's weight from 6 months ago, as weightloss and assistance with ADL's may also qualify pt for hospice.  informed Malcolm that jobshare partner, Krissy, will be following pt tomorrow.     @1559-  messaged Gold 6 Provider, HEBERT Velazquez, requesting an Albumin test and to discuss Sidney's need for hospice diagnosis.  HEBERT shared Albumin would require pt get a poke and posed if any of pt's other diagnoses could be acceptable.     Next Steps:   - Follow up with Malcolm at [facility] for clinical acceptance at the [facility].  - Follow up with EMS billing once discharge address is known so ANS form can be faxed over  and completed by family.  - Update provider and continue to follow for safe discharge.  __________________________     ROBIN Sinclair, ANNE MARIE  , [facility]  7C Medical Surgical Beds 2620-2098   Desk Phone: [phone]   Securely message with [employer] (Search Name or  Med Surg [id number] )  Text page via [facility] Paging/Directory   See signed in provider for up to date coverage information  Social Work & Care Management Department

## 2025-06-10 NOTE — PLAN OF CARE
Shift Hours: 1500 - 0700    Assessment:  Body systems that were not at patient's baseline Cognitive/Behavioral/Neuro, Cardiac, Peripheral Neurovascular, Gastrointestinal, Genitourinary, Skin, Musculoskeletal, and Safety. Focused body system assessments documented in flowsheets.        Activity     Fall Risk Score: 110   Bed alarm on? Yes     Activity Assistance Provided: assistance, 2 people      Assistive Device Utilized: gait belt, walker    Pain: C/O back pain, scheduled voltaren gel in use and adequate for pain coverage    Labs/RN Managed Protocols: NA    Lines/Drains: L PIV    Nutrition: On pureed diet. Able to feed herself slowly if food placed in front of her    Goal Outcome Evaluation  Plan of Care Reviewed With: patient  Overall Patient Progress: no change  Outcome Evaluation: Pt is comfort cares. She can make her needs known, but takes time to answer questions. On pureed diet. Able to feed herself slowly if food placed in front of her. Incont of bowel ands bladder ON. Turn q2. L PIV with D5 LR infusing at 50mL/hr. Spotcheck BG ON was 345.    Barriers to Discharge:   Finding LTC with hospice in [facility]. Possible need for repeat Care conf with family pending ongoing placement difficulty

## 2025-06-10 NOTE — PLAN OF CARE
Shift Hours: 0700 - 1900    Assessment:  Body systems that were not at patient's baseline Cognitive/Behavioral/Neuro, Gastrointestinal, Genitourinary, Skin, Musculoskeletal, and Safety. Focused body system assessments documented in flowsheets.        Activity     Fall Risk Score: 110   Bed alarm on? Yes     Activity Assistance Provided: assistance, 2 people      Assistive Device Utilized: gait belt, walker    Pain: denies    Labs/RN Managed Protocols: none    Lines/Drains: L PIV    Nutrition: pureed     Goal Outcome Evaluation  Plan of Care Reviewed With: patient  Overall Patient Progress: no change  Outcome Evaluation: Comfort cares. Incontinent of bowel and bladder. Q2 repo. L PIV infusing 50 mL/hr. Continue POC.    Barriers to Discharge:   LTC with hospice placement

## 2025-06-11 ENCOUNTER — DOCUMENTATION ONLY (OUTPATIENT)
Dept: MEDSURG UNIT | Facility: CLINIC | Age: 65
End: 2025-06-11
Payer: COMMERCIAL

## 2025-06-11 PROCEDURE — 250N000009 HC RX 250: Performed by: INTERNAL MEDICINE

## 2025-06-11 PROCEDURE — 250N000013 HC RX MED GY IP 250 OP 250 PS 637: Performed by: INTERNAL MEDICINE

## 2025-06-11 PROCEDURE — 120N000002 HC R&B MED SURG/OB UMMC

## 2025-06-11 PROCEDURE — 250N000013 HC RX MED GY IP 250 OP 250 PS 637: Performed by: PHYSICIAN ASSISTANT

## 2025-06-11 PROCEDURE — 99232 SBSQ HOSP IP/OBS MODERATE 35: CPT | Performed by: INTERNAL MEDICINE

## 2025-06-11 RX ORDER — HALOPERIDOL 2 MG/ML
2 SOLUTION ORAL EVERY 6 HOURS PRN
Qty: 15 ML | Refills: 0 | Status: SHIPPED | OUTPATIENT
Start: 2025-06-11

## 2025-06-11 RX ORDER — LORAZEPAM 2 MG/ML
0.5 CONCENTRATE ORAL EVERY 4 HOURS PRN
Qty: 30 ML | Refills: 0 | Status: SHIPPED | OUTPATIENT
Start: 2025-06-11 | End: 2025-06-11

## 2025-06-11 RX ORDER — MICONAZOLE NITRATE 20 MG/G
CREAM TOPICAL 2 TIMES DAILY
Qty: 35 G | Refills: 0 | Status: SHIPPED | OUTPATIENT
Start: 2025-06-11 | End: 2025-06-11

## 2025-06-11 RX ORDER — LORAZEPAM 2 MG/ML
0.5 CONCENTRATE ORAL EVERY 4 HOURS PRN
Qty: 30 ML | Refills: 0 | Status: SHIPPED | OUTPATIENT
Start: 2025-06-11

## 2025-06-11 RX ORDER — METHADONE HYDROCHLORIDE 10 MG/ML
1 CONCENTRATE ORAL EVERY 12 HOURS
Qty: 1 ML | Refills: 0 | Status: SHIPPED | OUTPATIENT
Start: 2025-06-11 | End: 2025-06-11

## 2025-06-11 RX ORDER — HALOPERIDOL 2 MG/ML
2 SOLUTION ORAL EVERY 6 HOURS PRN
Qty: 15 ML | Refills: 0 | Status: SHIPPED | OUTPATIENT
Start: 2025-06-11 | End: 2025-06-11

## 2025-06-11 RX ORDER — MICONAZOLE NITRATE 20 MG/G
CREAM TOPICAL 2 TIMES DAILY
Qty: 35 G | Refills: 0 | Status: SHIPPED | OUTPATIENT
Start: 2025-06-11

## 2025-06-11 RX ORDER — METHADONE HYDROCHLORIDE 5 MG/5ML
1 SOLUTION ORAL 2 TIMES DAILY
Qty: 10 ML | Refills: 0 | Status: SHIPPED | OUTPATIENT
Start: 2025-06-11

## 2025-06-11 RX ORDER — OXYCODONE HCL 20 MG/ML
2.5-5 CONCENTRATE, ORAL ORAL
Qty: 30 ML | Refills: 0 | Status: SHIPPED | OUTPATIENT
Start: 2025-06-11

## 2025-06-11 RX ORDER — OXYCODONE HCL 20 MG/ML
2.5-5 CONCENTRATE, ORAL ORAL
Qty: 30 ML | Refills: 0 | Status: SHIPPED | OUTPATIENT
Start: 2025-06-11 | End: 2025-06-11

## 2025-06-11 RX ORDER — LEVETIRACETAM 100 MG/ML
250 SOLUTION ORAL 2 TIMES DAILY
Qty: 15 ML | Refills: 0 | Status: ACTIVE | OUTPATIENT
Start: 2025-06-11 | End: 2025-06-11

## 2025-06-11 RX ORDER — LEVETIRACETAM 100 MG/ML
250 SOLUTION ORAL 2 TIMES DAILY
Qty: 15 ML | Refills: 0 | Status: ACTIVE | OUTPATIENT
Start: 2025-06-11

## 2025-06-11 RX ADMIN — MICONAZOLE NITRATE: 20 CREAM TOPICAL at 09:45

## 2025-06-11 RX ADMIN — LEVETIRACETAM 250 MG: 100 SOLUTION ORAL at 21:06

## 2025-06-11 RX ADMIN — DICLOFENAC SODIUM 2 G: 10 GEL TOPICAL at 09:46

## 2025-06-11 RX ADMIN — Medication 10 MG: at 09:46

## 2025-06-11 RX ADMIN — DICLOFENAC SODIUM 2 G: 10 GEL TOPICAL at 12:39

## 2025-06-11 RX ADMIN — METHADONE HYDROCHLORIDE 1 MG: 10 CONCENTRATE ORAL at 09:46

## 2025-06-11 RX ADMIN — LEVETIRACETAM 250 MG: 100 SOLUTION ORAL at 09:47

## 2025-06-11 RX ADMIN — METHADONE HYDROCHLORIDE 1 MG: 10 CONCENTRATE ORAL at 21:06

## 2025-06-11 ASSESSMENT — ACTIVITIES OF DAILY LIVING (ADL)
ADLS_ACUITY_SCORE: 98

## 2025-06-11 NOTE — PROGRESS NOTES
Care Management Follow Up    Length of Stay (days): 35  Expected Discharge Date: 06/12/2025  Concerns to be Addressed: discharge planning, coping/stress, care coordination/care conferences, decision making, utilization management     Patient plan of care discussed at interdisciplinary rounds: Yes  Anticipated Discharge Disposition: Long Term Care, Hospice  Anticipated Discharge Services: None  Anticipated Discharge DME: None  Patient/family educated on Medicare website which has current facility and service quality ratings: yes  Education Provided on the Discharge Plan: Yes  Patient/Family in Agreement with the Plan: yes  Referrals Placed by CM/SW: Post Acute Facilities, Hospice  Private pay costs discussed: Not applicable  Discussed  Partnership in Safe Discharge Planning  document with patient/family: No   Handoff Completed: No, handoff not indicated or clinically appropriate  Additional Information: Writer received a call from pt's daughter Nehal, who wanted to confirm she would like to pursue placement for pt at [facility] in [place], [place]. Writer agreed to pursue this and provide her any updates.    Writer called and spoke with Latasha in admissions with [facility]. Writer clarified pt's hospice qualifying diagnoses and prognosis. She confirmed pt has been clinically accepted to admit to their facility tomorrow with an arrival prior to 2pm. She emailed over paperwork for the pt's family to sign.    Writer set up a discharge stretcher ride through [facility] (see details below). Pt's family will need to sign paperwork regarding payment if insurance doesn't cover the full cost of the ride.    Pt's daughter Maria Guadalupe completed the paperwork for [facility] & [facility] and writer faxed them to their respective facilities. NST confirmed having faxed that paperwork over.    Discharge Resources:    [facility]  [place], [address] , [place], [place]  Ph: [phone];  Malcolm Nunn- Ph: [phone]  $170/day room and board    [facility] Ph: [phone]  Transport Type:[facility]  Indication/Need: pt is confused    Ride Date: 6/12/2025 Window Scheduled: 0840-0925   Private Pay Cost Discussed: yes  PCS Completed:Yes  -Family would need to sign ANS to agree to supplemenmt any private pay costs for the ride out of state.    ROBIN Villarreal, LICSW  Clinical   [facility] Acute Care Management  [phone]  Available on Vocera: 7C Med Surg 6693 thru 1613 SW

## 2025-06-11 NOTE — PLAN OF CARE
Shift Hours: 0700 - 1900    Assessment:  Body systems that were not at patient's baseline Cognitive/Behavioral/Neuro, Genitourinary, and Musculoskeletal. Focused body system assessments documented in flowsheets.        Activity     Fall Risk Score: 110   Bed alarm on? Yes     Activity Assistance Provided: assistance, 2 people      Assistive Device Utilized: gait belt, walker    Pain: denies    Labs/RN Managed Protocols: none    Lines/Drains: L PIV    Nutrition: Pureed, thin liquids    Goal Outcome Evaluation  Plan of Care Reviewed With: patient  Overall Patient Progress: no change  Outcome Evaluation: Comfort cares. Incontinent, intermittently using primafit. Bed and chair alarm in use. Planning to discharge to [facility] tomorrow. Continue POC.    Barriers to Discharge:   Anticipate discharge tomorrow

## 2025-06-11 NOTE — PROGRESS NOTES
[facility]    Medicine Progress Note - Hospitalist Service, GOLD TEAM 6    Date of Admission:  5/7/2025    Assessment & Plan   Isabell Matthews is a 64F w/ PMH T1DM c/b diabetic neuropathy, orthostatic hypotension, seizures, vascular dementia, prior CVAs, hypothyroidism, recurrent UTIs, and frequent falls  admitted on 5/7/2025.   She initially presented to the ED for evaluation of stroke-like symptoms. MRI completed showing acute infarcts for which she was not a candidate for advanced therapies. Ongoing admission due to ongoing goals of care discussion between providers and family for disposition. Care conference on 5/29 with decision to transition Isabell to comfort cares and placing referrals for hospice with preference for [facility]. Leaving NG access and TF as well as insulin coverage in the interim to allow for additional family to visit from out of town for now.     Update, 6/11/25:  - Pt's children agreed to have pt discharged to residential hospice in [place], [place] on 6/10  - Ordered three day supply of medications including comfort meds to take with her.  - SW trying to coordinate transportation to this facility, most likely tomorrow 6/12     Goals of care  Comfort cares  Decision made to transition to comfort cares and discharge to hospice facility (hopeful for [facility]). 1:1 bedside attendant and insulin carb coverage discontinued 6/8 to help facilitate acceptance at OP facility.   - Pt's children have agreed to have pt discharged to residential hospice in [place], [place]  - SW trying to coordinate transportation to this facility  - NG dislodged 6/4 evening              - stopped TF              - stopped NPH  - Family with concern for medication withdrawal side effects; will work on weaning especially her psychiatric medications. Continue current dose duloxetine per d/w daughter for now (missed dose on 6/6)  - Palliative care or followed until 6/6, appropriate to sign  off  - Comfort cares (no vitals, labs, etc)  - Methadone 1 mg q12h (missed dose on 6/6)  - Lorazepam solution 0.5 mg q4h PRN      Multifocal acute cerebral infarcts  Hx of multiple CVAs  Hx of seizures  Presented w/ new findings of dysarthria, dysphagia, decreased responsiveness, confusion. CT head negative for acute infarct or hemorrhage. CTA w/ multifocal stenosis involving L RADHA and R MCA which had progressed since 1/16/25. Stroke Neuro team consulted while in the ED. Loaded with keppra. EEG negative. Found to have UTI so concern presentation was recrudescence of prior CVA deficits however MRI brain obtained showing acute infarcts in R corona radiata, precentral gyrus and operculum. Not a candidate for advanced therapies.   - Stopped DAPT, atorvastatin due to comfort cares focus in discussion with family  - Continued on Keppra 250 mg BID (tolerating oral dosing, though did not take dose morning of 6/6)   - Ativan available as needed seizures     Acute metabolic encephalopathy, multifactorial  Hx of vascular dementia  Acute change in status noted on arrival, possibly 2/2 acute CVA vs metabolic encephalopathy. Likely multifactorial with UTI, dehydration, hyperglycemia, hypercapnia, and hospital environment contributing, as well as newfound acute CVA as above. No significant electrolyte abnormalities. No suspicious meds. TSH 0.14. Patient no longer somnolent or encephalopathic, although her cognitive function does not appear intact. Family reports patient is close to baseline.   - Delirium precautions  - Melatonin 1mg at bedtime stopped as NG removed     NGT removed per patient 6/4 evening  Dysphagia s/p NGT placement 5/10  Failed initial swallow study. Likely in the setting of acute encephalopathy and acute stroke. SLP consulted, recommended strict NPO. NG placed 5/10 and started on TF. In prior discussions, PEG not within GOC. SLP re-evaluated 5/22 with patient retaining food in mouth despite multiple cues to  follow but when she does eventually there is no evidence of aspiration or dysphagia though at high risk for aspiration due to oral retention. Given this, it is unlikely she will be able to maintain adequate PO intake to prevent malnutrition. RD followed for TF input and were tolerated at goal.   - Pt self- removed NG 6/4 evening   - Continue pureed diet with thin liquids  - Discontinued 1:1 supervision eating 6/8 per SNF stipulations, discussed with patient's daughter  - Continue to involve family in ongoing discussions      DM1  Hypoglycemia episodes   A1c of 8.8% in 3/2025. Had been NPO due to to dysphagia and now on TF. Labile blood sugars and hypoglycemia noted in setting of prior infection. PTA regimen includes lantus 18 units at bedtime. Updated Endocrine regarding plan to stop TF after family visits. Plan to stop NPH and do basal insulin only. Not on carb coverage.   Recent Labs   Lab 06/10/25  0230 06/08/25  1047 06/08/25  0548 06/08/25  0300 06/08/25  0224 06/08/25  0207   * 155* 98 92 108* 45*      - Stopped NPH as no longer on TF (above)  - Stopped carb coverage (6/8) per SNF stipulations   - Endocrine signed off but helping peripherally   - Lantus 8 units daily at 1200 (this will remain her only coverage after TF stopped)  - BG checks q4h, hypoglycemia protocol   - PRN D10 available  - Resumed D5/LR     Possible stress cardiomyopathy  Echo this admission with LVEF 50-55% with mid ventricular hypokinesis w/ preserved apical and mid segements c/w stress CM. No obvious signs of heart failure. Clinically, she has no s/sx to suggest acutely decompensated HF. Has remained RRR.   - Stopped carvedilol due to comfort cares     Hypothyroidism  Most recent TSH 0.99. Repeat TSH here 0.14. On admission, daughter noted recent medication adjustments (brand synthroid vs generic) therefore may have been over-replaced. Cannot r/o sick euthyroid state as well.  - Stopped PTA PO synthroid 88mcg (NG removed)     CKD  stage IIIb  Baseline Cr of 1.4-1.6. Cr improved to 1.3-1.4 with IVF. Suspected chronic dehydration contributing. Discontinued further lab draws now on comfort cares.      HTN  HLD  -180 on admission. Can allow for permissive hypertension in setting of acute stroke per Stroke Neuro.  - Stopped antihypertensives due to comfort cares     Chronic low back pain  - Diclofenac gel PRN  - Decreased duloxetine from 20 mg daily to 10 mg daily with plan to stop prior to discharge   - Stopped gabapentin     Closed colles' fracture of L wrist  Occurred in April, saw Ortho 4/22/25, but not deemed a surgical candidate d/t her medical comorbidities. Per Ortho, they wanted her in brace and to be NWB status at L wrist. Repeat XR redemonstrated known comminuted intra-articular fracture of distal left radius. Ortho consulted with recommendations for hand therapy and progressing ROM as tolerate out of brace.   - Coffee cup weight bearing to L wrist.  - Hand therapy if able, ROM as tolerated out of brace     Left wrist rash- stable   Initially felt to be pressure injury from wrist brace. Concern it developed vesicular appearance however VZV negative.   - WOCN consult input appreciated         Diet: Pureed Diet (level 4) Thin Liquids (level 0)    DVT Prophylaxis: VTE Prophylaxis contraindicated due to hospice  Parker Catheter: Not present  Lines: None     Cardiac Monitoring: None  Code Status: No CPR- Do NOT Intubate      Social Drivers of Health    Food Insecurity: Unknown (5/17/2025)    Food Insecurity     Within the past 12 months, did you worry that your food would run out before you got money to buy more?: Patient unable to answer     Within the past 12 months, did the food you bought just not last and you didn t have money to get more?: Patient unable to answer   Depression: At risk (4/2/2025)    PHQ-2     PHQ-2 Score: 4   Housing Stability: Unknown (5/17/2025)    Housing Stability     Do you have housing? : Patient unable to  answer     Are you worried about losing your housing?: Patient unable to answer   Tobacco Use: Medium Risk (4/24/2025)    Patient History     Smoking Tobacco Use: Former     Smokeless Tobacco Use: Never   Financial Resource Strain: Unknown (5/17/2025)    Financial Resource Strain     Within the past 12 months, have you or your family members you live with been unable to get utilities (heat, electricity) when it was really needed?: Patient unable to answer   Transportation Needs: Unknown (5/17/2025)    Transportation Needs     Within the past 12 months, has lack of transportation kept you from medical appointments, getting your medicines, non-medical meetings or appointments, work, or from getting things that you need?: Patient unable to answer   Interpersonal Safety: Unknown (5/17/2025)    Interpersonal Safety     Do you feel physically and emotionally safe where you currently live?: Patient unable to answer     Within the past 12 months, have you been hit, slapped, kicked or otherwise physically hurt by someone?: Patient unable to answer     Within the past 12 months, have you been humiliated or emotionally abused in other ways by your partner or ex-partner?: Patient unable to answer          Disposition Plan     Medically Ready for Discharge: Ready Now    The patient's care was discussed with the Attending Physician, Dr. Shearer, Patient, and Patient's Family.    Stone Velazquez PA-C  Hospitalist Service, GOLD TEAM [address] [place] [address]  Securely message with [employer] (more info)  Text page via [facility] Paging/Directory   See signed in provider for up to date coverage information  ______________________________________________________________________    Interval History   NAEO. Pt's children and finalized their decision and want pt discharged to residential hospice in [place], [place].     Physical Exam   Vital Signs:                    Weight: 113 lbs 0 oz  GEN: In NAD  LUNGS:  CTAB  CV: RRR  ABD: +BSs; SNTND  EXT: No BLE edema  NEURO: Awake, but does not respond to questions.       Medical Decision Making       30 MINUTES SPENT BY ME on the date of service doing chart review, history, exam, documentation & further activities per the note.      Data   CMP  Recent Labs   Lab 06/10/25  0230 06/08/25  1047 06/08/25  0548 06/08/25  0300   * 155* 98 92     CBCNo lab results found in last 7 days.  INRNo lab results found in last 7 days.  Arterial Blood GasNo lab results found in last 7 days.

## 2025-06-11 NOTE — PROGRESS NOTES
Prior Authorization Approval    Medication: LORAZEPAM 2 MG/ML PO CONC  Authorization Effective Date: 6/11/2025  Authorization Expiration Date: 6/11/2026  Approved Dose/Quantity: 2mg/ml   /   30ml  Reference #: [id number] , PA Case ID #: [id number]   Insurance Company: [employer] - Phone [phone] Fax [phone]  Expected CoPay: $ 0  CoPay Card Available: No    Financial Assistance Needed: no  Which Pharmacy is filling the prescription: [facility] - [place], [place] - [address]  Pharmacy Notified: Yes          Mónica Lr  [facility] Liaison (A - L)  Phone [phone]  Fax [phone]  Available on Teams & Anthony

## 2025-06-11 NOTE — PLAN OF CARE
Goal Outcome Evaluation:    Shift Hours: 1900 - 0700    Assessment:  Body systems that were not at patient's baseline Cognitive/Behavioral/Neuro, Gastrointestinal, Genitourinary, Skin, Musculoskeletal, and Safety. Focused body system assessments documented in flowsheets.        Activity     Fall Risk Score: 110   Bed alarm on? Yes     Activity Assistance Provided: assistance, 2 people      Assistive Device Utilized: gait belt, walker    Pain: None per pt    Labs/RN Managed Protocols: None    Lines/Drains: PIV running D5 LR    Nutrition: Pureed, feeder    Goal Outcome Evaluation    Plan of Care Reviewed With: patient  Overall Patient Progress: no change    Outcome Evaluation: Patient continues comfort cares. Mentation no change, continues to interact verbally occasionally and follows all commands. Swallowing continues to require coaching and to be sitting way upright w/ 1:1 feed. Bed alarm on. Changed frequently, incontinent. CPOC.    Barriers to Discharge:     Hospice/Family planning

## 2025-06-12 ENCOUNTER — DOCUMENTATION ONLY (OUTPATIENT)
Dept: MEDSURG UNIT | Facility: CLINIC | Age: 65
End: 2025-06-12
Payer: COMMERCIAL

## 2025-06-12 PROCEDURE — 250N000013 HC RX MED GY IP 250 OP 250 PS 637: Performed by: PHYSICIAN ASSISTANT

## 2025-06-12 PROCEDURE — 99238 HOSP IP/OBS DSCHRG MGMT 30/<: CPT | Performed by: INTERNAL MEDICINE

## 2025-06-12 PROCEDURE — 250N000013 HC RX MED GY IP 250 OP 250 PS 637: Performed by: INTERNAL MEDICINE

## 2025-06-12 PROCEDURE — 250N000009 HC RX 250: Performed by: INTERNAL MEDICINE

## 2025-06-12 PROCEDURE — 99239 HOSP IP/OBS DSCHRG MGMT >30: CPT | Performed by: PHYSICIAN ASSISTANT

## 2025-06-12 RX ADMIN — Medication 10 MG: at 09:03

## 2025-06-12 RX ADMIN — LEVETIRACETAM 250 MG: 100 SOLUTION ORAL at 09:04

## 2025-06-12 RX ADMIN — METHADONE HYDROCHLORIDE 1 MG: 10 CONCENTRATE ORAL at 09:03

## 2025-06-12 ASSESSMENT — ACTIVITIES OF DAILY LIVING (ADL)
ADLS_ACUITY_SCORE: 98

## 2025-06-12 NOTE — SUMMARY OF CARE
Discharge to: [facility]  Transportation: [facility] stretcher ride  Time: 0930  Prescriptions: picked up @ discharge pharmacy, sent with EMS  Belongings: daughter took home ban   -if applicable return home meds from inpatient pharmacy: N/A  Access: none  Care plan and education discontinued: done  Paperwork: packet printed and sent with EMS

## 2025-06-12 NOTE — PLAN OF CARE
Goal Outcome Evaluation:    Shift Hours: 1900 - 0700    Assessment:  Body systems that were not at patient's baseline Cognitive/Behavioral/Neuro. Focused body system assessments documented in flowsheets.        Activity     Fall Risk Score: 110   Bed alarm on? Yes     Activity Assistance Provided: assistance, 2 people      Assistive Device Utilized: gait belt, walker    Pain: None per pt    Labs/RN Managed Protocols: NONE    Lines/Drains: L PIV SL    Nutrition: Pureed 1:1 feed    Goal Outcome Evaluation    Plan of Care Reviewed With: patient  Overall Patient Progress: no change    Outcome Evaluation: Patient showered this shift. Continues comfort cares and rested well overnight. Incontinent, changed frequently. Tolerated some pudding and ensure at nightime with 1:1 feeding assistance. Bed alarm on, rounded QH. PIV SL.    Barriers to Discharge:     Plan for am discharge

## 2025-06-12 NOTE — DISCHARGE SUMMARY
[facility]  Hospitalist Discharge Summary      Date of Admission:  5/7/2025  Date of Discharge:  6/12/2025  Discharging Provider: Jose Armando Velazquez PA-C; Dr. Villeda  Discharge Service: Hospitalist Service, GOLD TEAM 6    Discharge Diagnoses   Goals of care  Transitioned to comfort cares, 5/29/25  Multifocal acute cerebral infarcts  Hx of multiple CVAs  Hx of seizures  Acute metabolic encephalopathy, multifactorial  Hx of vascular dementia  Severe malnutrition  NGT removed per patient 6/4 evening  Dysphagia s/p NGT placement 5/10  DM1  Hypoglycemia episodes   Possible stress cardiomyopathy  Hypothyroidism  CKD stage IIIb  HTN  HLD  Chronic low back pain  Closed colles' fracture of L wrist  Left wrist rash- stable     Discharge Disposition   Discharged to residential hospice facility in [place], [place]  Condition at discharge: Terminal    Hospital Course   Isabell Matthews is a 64F w/ PMH T1DM c/b diabetic neuropathy, orthostatic hypotension, seizures, vascular dementia, prior CVAs, hypothyroidism, recurrent UTIs, and frequent falls  admitted on 5/7/2025. She initially presented to the ED for evaluation of stroke-like symptoms. MRI completed showing acute infarcts for which she was not a candidate for advanced therapies. Ongoing admission due to ongoing goals of care discussion between providers and family for disposition. Care conference on 5/29 with decision to transition Isabell to comfort cares and placing referrals for hospice with preference for [facility]. Earlier in week learned that bed was open at residential hospice facility in [place], [place] and Gali, pt's daughter, d/w her siblings and they agreed to have their Mom discharged to this facility. Transportation available to transport pt today.      Goals of care  Transitioned to comfort cares, 5/29/25  Decision made to transition to comfort cares and discharge to hospice facility. 1:1 bedside attendant and insulin carb coverage  discontinued 6/8 to help facilitate acceptance at OP facility.   - Pt's children have agreed to have pt discharged to residential hospice in [place], [place] on 6/12.   - NG dislodged 6/4 evening              - stopped TF              - stopped NPH  - Palliative care or followed until 6/6, appropriate to sign off  - Comfort cares (no vitals, labs, etc)  - Methadone 1 mg q12h (missed dose on 6/6)  - Lorazepam solution 0.5 mg q4h PRN      Multifocal acute cerebral infarcts  Hx of multiple CVAs  Hx of seizures  Presented w/ new findings of dysarthria, dysphagia, decreased responsiveness, confusion. CT head negative for acute infarct or hemorrhage. CTA w/ multifocal stenosis involving L RADHA and R MCA which had progressed since 1/16/25. Stroke Neuro team consulted while in the ED. Loaded with keppra. EEG negative. Found to have UTI so concern presentation was recrudescence of prior CVA deficits however MRI brain obtained showing acute infarcts in R corona radiata, precentral gyrus and operculum. Not a candidate for advanced therapies.   - Stopped DAPT, atorvastatin due to comfort cares focus in discussion with family  - Continued on Keppra 250 mg BID (tolerating oral dosing, though did not take dose morning of 6/6)   - Ativan available as needed seizures     Acute metabolic encephalopathy, multifactorial  Hx of vascular dementia  Acute change in status noted on arrival, possibly 2/2 acute CVA vs metabolic encephalopathy. Likely multifactorial with UTI, dehydration, hyperglycemia, hypercapnia, and hospital environment contributing, as well as newfound acute CVA as above. No significant electrolyte abnormalities. No suspicious meds. TSH 0.14. Patient no longer somnolent or encephalopathic, although her cognitive function does not appear intact. Family reports patient is close to baseline.   - Melatonin 1mg at bedtime stopped as NG removed     Severe malnutrition  NGT removed per patient 6/4 evening  Dysphagia s/p NGT  placement 5/10  Failed initial swallow study. Likely in the setting of acute encephalopathy and acute stroke. SLP consulted, recommended strict NPO. NG placed 5/10 and started on TF. In prior discussions, PEG not within GOC. SLP re-evaluated 5/22 with patient retaining food in mouth despite multiple cues to follow but when she does eventually there is no evidence of aspiration or dysphagia though at high risk for aspiration due to oral retention. Given this, it is unlikely she will be able to maintain adequate PO intake to prevent malnutrition. RD followed for TF input and were tolerated at goal.   - Pt self-removed NG 6/4 evening   - Continue pureed diet with thin liquids  - Discontinued 1:1 supervision eating 6/8 per SNF stipulations, discussed with patient's daughter     DM1  Hypoglycemia episodes   A1c of 8.8% in 3/2025. Had been NPO due to to dysphagia and now on TF. Labile blood sugars and hypoglycemia noted in setting of prior infection. PTA regimen includes lantus 18 units at bedtime. Updated Endocrine regarding plan to stop TF after family visits. Plan to stop NPH and do basal insulin only. Not on carb coverage.   Recent Labs   Lab 06/10/25  0230 06/08/25  1047 06/08/25  0548 06/08/25  0300 06/08/25  0224 06/08/25  0207   * 155* 98 92 108* 45*      - Stopped NPH as no longer on TF (above)  - Stopped carb coverage (6/8) per SNF stipulations   - Endocrine signed off but was helping peripherally   - Lantus 8 units qam     Possible stress cardiomyopathy  Echo this admission with LVEF 50-55% with mid ventricular hypokinesis w/ preserved apical and mid segements c/w stress CM. No obvious signs of heart failure. Clinically, she has no s/sx to suggest acutely decompensated HF. Has remained RRR.   - Stopped carvedilol due to comfort cares     Hypothyroidism  Most recent TSH 0.99. Repeat TSH here 0.14. On admission, daughter noted recent medication adjustments (brand synthroid vs generic) therefore may have  been over-replaced. Cannot r/o sick euthyroid state as well.  - Stopped PTA PO synthroid 88mcg (NG removed)     CKD stage IIIb  Baseline Cr of 1.4-1.6. Cr improved to 1.3-1.4 with IVF. Suspected chronic dehydration contributing. Discontinued further lab draws now on comfort cares.      HTN  HLD  -180 on admission. Can allow for permissive hypertension in setting of acute stroke per Stroke Neuro.  - Stopped antihypertensives due to comfort cares     Chronic low back pain  - Diclofenac gel PRN  - Decreased duloxetine from 20 mg daily to 10 mg daily with plan to stop prior to discharge   - Stopped gabapentin     Closed colles' fracture of L wrist  Occurred in April, saw Ortho 4/22/25, but not deemed a surgical candidate d/t her medical comorbidities. Per Ortho, they wanted her in brace and to be NWB status at L wrist. Repeat XR redemonstrated known comminuted intra-articular fracture of distal left radius. Ortho consulted with recommendations for hand therapy and progressing ROM as tolerate out of brace, however, pt now on comfort cares.     Left wrist rash- stable   Initially felt to be pressure injury from wrist brace. Concern it developed vesicular appearance, however, VZV negative.       Consultations This Hospital Stay   NURSING TO CONSULT FOR VASCULAR ACCESS CARE IP CONSULT  SPEECH LANGUAGE PATH ADULT IP CONSULT  CARE MANAGEMENT / SOCIAL WORK IP CONSULT  NURSING TO CONSULT FOR VASCULAR ACCESS CARE IP CONSULT  NUTRITION SERVICES ADULT IP CONSULT  ENDOCRINE DIABETES ADULT IP CONSULT  IP CONSULT DIPIKA DIEHL  NUTRITION SERVICES ADULT IP CONSULT  PHARMACY IP CONSULT  PHYSICAL THERAPY ADULT IP CONSULT  OCCUPATIONAL THERAPY ADULT IP CONSULT  NURSING TO CONSULT FOR VASCULAR ACCESS CARE IP CONSULT  NURSING TO CONSULT FOR VASCULAR ACCESS CARE IP CONSULT  SOCIAL WORK IP CONSULT  PHARMACY IP CONSULT  WOUND OSTOMY CONTINENCE NURSE  IP CONSULT  PALLIATIVE CARE ADULT IP CONSULT  OCCUPATIONAL THERAPY ADULT IP  CONSULT  WOUND OSTOMY CONTINENCE NURSE  IP CONSULT  PHARMACY LIAISON FOR MEDICATION COVERAGE CONSULT  NURSING TO CONSULT FOR VASCULAR ACCESS CARE IP CONSULT    Code Status   No CPR- Do NOT Intubate    Time Spent on this Encounter   I, Stone Velazquez PA-C, personally saw the patient today and spent greater than 30 minutes discharging this patient.       Stone Velazquez PA-C  [facility] MED SURG  [address]  [place] [address]  Phone: [phone]  ______________________________________________________________________    Physical Exam   Vital Signs:                    Weight: 113 lbs 0 oz  Pt on comfort cares       Primary Care Physician   Ronnie Kellogg    Discharge Orders      Primary Care - Care Coordination Referral      Primary Care - Care Coordination Referral      Primary Care - Care Coordination Referral      Reason for your hospital stay    Evaluation of stroke-like symptoms with MRI revealing acute infarcts for which she was not a candidate for advanced therapies. GOC discussion with pt and family and pt transitioned to comfort cares 5/29/25.     Diet    Follow this diet upon discharge: Current Diet:Orders Placed This Encounter      Pureed Diet (level 4) Thin Liquids (level 0)       Significant Results and Procedures   CMP  Recent Labs   Lab 06/10/25  0230 06/08/25  1047 06/08/25  0548 06/08/25  0300   * 155* 98 92       Discharge Medications   Current Discharge Medication List        START taking these medications    Details   haloperidol (HALDOL) 2 MG/ML (HIGH CONC) solution Take 1 mL (2 mg) by mouth every 6 hours as needed for agitation.  Qty: 15 mL, Refills: 0    Associated Diagnoses: End of life care      levETIRAcetam (KEPPRA) 100 MG/ML oral solution Take 2.5 mLs (250 mg) by mouth or Feeding Tube 2 times daily.  Qty: 15 mL, Refills: 0    Associated Diagnoses: End of life care; Seizures (H)      LORazepam (ATIVAN) 2 MG/ML (HIGH CONC) oral solution Take 0.25 mLs  (0.5 mg) by mouth every 4 hours as needed for anxiety or seizures.  Qty: 30 mL, Refills: 0    Associated Diagnoses: End of life care      methadone (DOLOPHINE) 5 MG/5ML solution Take 1 mL (1 mg) by mouth 2 times daily.  Qty: 10 mL, Refills: 0    Associated Diagnoses: End of life care      miconazole with skin protectant (DL ANTIFUNGAL) 2 % CREA cream Apply topically 2 times daily.  Qty: 35 g, Refills: 0    Associated Diagnoses: End of life care      oxyCODONE (ROXICODONE INTENSOL) 20 mg/mL (HIGH CONC) solution Take 0.125-0.25 mLs (2.5-5 mg) by mouth every 2 hours as needed for severe pain (shortness of breath).  Qty: 30 mL, Refills: 0    Associated Diagnoses: End of life care           CONTINUE these medications which have CHANGED    Details   diclofenac (VOLTAREN) 1 % topical gel Apply 2 g topically 4 times daily.  Qty: 20 g, Refills: 0    Associated Diagnoses: End of life care      insulin glargine (LANTUS PEN) 100 UNIT/ML pen Inject 8 Units subcutaneously every morning (before breakfast).  Qty: 3 mL, Refills: 0    Comments: If Lantus is not covered by insurance, may substitute Basaglar or Semglee or other insulin glargine product per insurance preference at same dose and frequency.    Associated Diagnoses: End of life care; Type 1 diabetes mellitus with other circulatory complication (H)           CONTINUE these medications which have NOT CHANGED    Details   blood glucose (NO BRAND SPECIFIED) test strip Use to test blood sugar 4 times daily or as directed.  Qty: 360 strip, Refills: 3    Associated Diagnoses: Type 1 diabetes mellitus with diabetic polyneuropathy (H)      blood glucose monitoring (ONE TOUCH ULTRA MINI) meter device kit Use to test blood sugar 4 times daily or as directed.  Qty: 1 kit, Refills: 0    Associated Diagnoses: Type 1 diabetes mellitus with diabetic polyneuropathy (H)      !! Continuous Glucose Sensor (FREESTYLE MARIA FERNANDA 2 PLUS SENSOR) MISC Change every 15 days.  Qty: 6 each, Refills: 3     Associated Diagnoses: Type 1 diabetes mellitus with diabetic polyneuropathy (H)      !! Continuous Glucose Sensor (FREESTYLE MARIA FERNANDA 2 SENSOR) MISC Change every 14 days.  Qty: 6 each, Refills: 3    Associated Diagnoses: Type 1 diabetes mellitus with other circulatory complication (H)      PENTIPS 32G X 4 MM miscellaneous Inject 1 each subcutaneously daily. Use 4 pen needles daily or as directed.      zinc oxide (DESITIN) 20 % external ointment Apply topically as needed for dry skin or irritation  Qty: 85 g, Refills: 3    Associated Diagnoses: Fecal smearing       !! - Potential duplicate medications found. Please discuss with provider.        STOP taking these medications       acetaminophen (TYLENOL) 325 MG tablet Comments:   Reason for Stopping:         aspirin 81 MG EC tablet Comments:   Reason for Stopping:         atorvastatin (LIPITOR) 40 MG tablet Comments:   Reason for Stopping:         carvedilol (COREG) 6.25 MG tablet Comments:   Reason for Stopping:         cephALEXin (KEFLEX) 500 MG capsule Comments:   Reason for Stopping:         chlorhexidine (PERIDEX) 0.12 % solution Comments:   Reason for Stopping:         cyanocobalamin (VITAMIN B-12) 1000 MCG tablet Comments:   Reason for Stopping:         DULoxetine (CYMBALTA) 20 MG capsule Comments:   Reason for Stopping:         gabapentin (NEURONTIN) 100 MG capsule Comments:   Reason for Stopping:         Glucagon (GVOKE HYPOPEN) 1 MG/0.2ML pen Comments:   Reason for Stopping:         hydrALAZINE (APRESOLINE) 10 MG tablet Comments:   Reason for Stopping:         insulin aspart (NOVOLOG FLEXPEN) 100 UNIT/ML pen Comments:   Reason for Stopping:         insulin aspart (NOVOLOG FLEXPEN) 100 UNIT/ML pen Comments:   Reason for Stopping:         levETIRAcetam (KEPPRA) 500 MG tablet Comments:   Reason for Stopping:         Lidocaine (LIDOCARE) 4 % Patch Comments:   Reason for Stopping:         loratadine (CLARITIN) 10 MG tablet Comments:   Reason for Stopping:          meclizine (ANTIVERT) 12.5 MG tablet Comments:   Reason for Stopping:         methocarbamol (ROBAXIN) 500 MG tablet Comments:   Reason for Stopping:         SYNTHROID 88 MCG tablet Comments:   Reason for Stopping:             Allergies   Allergies   Allergen Reactions    Seasonal Allergies

## 2025-06-12 NOTE — PROGRESS NOTES
PA Initiation    Medication: METHADONE HCL 5 MG/5ML PO SOLN  Insurance Company: Leo - Phone [phone] Fax [phone]  Pharmacy Filling the Rx: [facility] - [place], [place] - [address]  Filling Pharmacy Phone: [phone]   Start Date: 6/12/2025          Mónica Lr  [facility] Pharmacy Liaison (A - L)  Phone [phone]  Fax [phone]  Available on Teams & Vocera

## 2025-06-12 NOTE — PROGRESS NOTES
Care Management Discharge Note    Discharge Date: 06/12/2025  Discharge Disposition: Hospice    [facility]  [facility], [address], [place], [place]  Ph: [phone];  Malcolm Nunn- Ph: [phone]    Discharge Services: None  Discharge DME: None  Discharge Transportation: agency    [facility] [employer] [facility] Ph: [phone]  Transport Type:[facility]  Indication/Need: pt is confused    Ride Date: 6/12/2025 Window Scheduled: 0840-0925   Private Pay Cost Discussed: yes  PCS Completed:Yes    Private pay costs discussed: Hospice, Transportation (discussed with pt's daughter Maria Guadalupe)  Does the patient's insurance plan have a 3 day qualifying hospital stay waiver?  No  PAS Confirmation Code: N/A  Patient/family educated on Medicare website which has current facility and service quality ratings: yes  Education Provided on the Discharge Plan: Yes  Persons Notified of Discharge Plans: Provider, bedside nurse, PCP, pt, residential hospice, pt's daughter Maria Guadalupe, EMS  Patient/Family in Agreement with the Plan: yes  Handoff Referral Completed: No, handoff not indicated or clinically appropriate  Additional Information: Per Gold 6 Provider, this patient is medically ready for discharge. Per [facility] discussion, disposition plan is residential hospice. Pt's daughters Mel and Felton are in agreement with plan and gave preference of placement in [place]. Pt was clinically accepted into residential hospice in [place], [place]. An EMS stretcher ride was arranged for pick-up this morning. Orders faxed to hospice facility.     ROBIN Villarreal, [facility]HealthSW  Clinical   [facility] Acute Care Management  [phone]  Available on Vocera:  Med Surg [phone]

## 2025-06-12 NOTE — PROGRESS NOTES
Prior Authorization Approval    Medication: METHADONE HCL 5 MG/5ML PO SOLN  Authorization Effective Date: 6/11/2025  Authorization Expiration Date: 6/12/2026  Approved Dose/Quantity: 5mg/5ml  /  10ml  Reference #: [id number] , PA Case ID #: [id number]   Insurance Company: Leo - Phone [phone] Fax [phone]  Expected CoPay: $  0  CoPay Card Available: No    Which Pharmacy is filling the prescription: [facility] - [place], [place] - [address]  Pharmacy Notified: Yes          Mónica Lr  [facility] Pharmacy Liaison (A - L)  Phone [phone]  Fax [phone]  Available on Teams & Anthony

## 2025-06-24 ENCOUNTER — PATIENT OUTREACH (OUTPATIENT)
Dept: CARE COORDINATION | Facility: CLINIC | Age: 65
End: 2025-06-24
Payer: COMMERCIAL

## 2025-06-24 NOTE — PROGRESS NOTES
Clinic Care Coordination Contact    Situation: Patient chart reviewed by care coordinator.    Background: Patient identified via recently discharged from external organization.     Assessment: Per chart review, pt discharged to hospice facility in [place].    Plan/Recommendations: RN will not complete TCM call due to this.    JESSICA MCCABE RN on 6/24/2025 at 8:43 AM

## 2025-06-28 ENCOUNTER — HEALTH MAINTENANCE LETTER (OUTPATIENT)
Age: 65
End: 2025-06-28

## 2025-07-31 ENCOUNTER — TELEPHONE (OUTPATIENT)
Dept: ENDOCRINOLOGY | Facility: CLINIC | Age: 65
End: 2025-07-31
Payer: COMMERCIAL

## 2025-07-31 NOTE — TELEPHONE ENCOUNTER
NOTIFICATION OF A  PATIENT  EMAIL THIS COMPLETED INFORMATION TO THE APPROPRIATE ENTITY:    [facility]: [email or web address]   [facility]:  [email or web address]   [facility]: [email or web address]   [facility]:  [email or web address]     PATIENT INFORMATION   Last name: Cassie First name: Isabell   Medical Record Number: [id number] County of Death:    YOB: 1960 Date of Death:    PARENT (FOR PATIENTS UNDER 18) OR LEGAL GUARDIAN (IF APPLICABLE):   Relationship to  patient:   Last name: First name:   Date of Birth: Telephone Number:   Address:     City: State: Zip Code:   SURVIVING SPOUSE INFORMATION (IF THERE IS A SURVIVING SPOUSE)   Last name: First name:   Date of Birth: Telephone number:   Address:     City: State: Zip Code:   PERSON THAT INFORMED US OF PATIENT'S DEATH   Last name:Casey First name:Vishal   Relationship to  patient:Daughter Telephone number: [phone]

## (undated) RX ORDER — LIDOCAINE HYDROCHLORIDE 20 MG/ML
SOLUTION OROPHARYNGEAL
Status: DISPENSED
Start: 2022-12-11